# Patient Record
Sex: MALE | Race: WHITE | NOT HISPANIC OR LATINO | Employment: UNEMPLOYED | URBAN - METROPOLITAN AREA
[De-identification: names, ages, dates, MRNs, and addresses within clinical notes are randomized per-mention and may not be internally consistent; named-entity substitution may affect disease eponyms.]

---

## 2017-02-12 ENCOUNTER — GENERIC CONVERSION - ENCOUNTER (OUTPATIENT)
Dept: OTHER | Facility: OTHER | Age: 47
End: 2017-02-12

## 2017-02-12 ENCOUNTER — APPOINTMENT (INPATIENT)
Dept: RADIOLOGY | Facility: HOSPITAL | Age: 47
DRG: 201 | End: 2017-02-12
Payer: COMMERCIAL

## 2017-02-12 ENCOUNTER — APPOINTMENT (INPATIENT)
Dept: CT IMAGING | Facility: HOSPITAL | Age: 47
DRG: 201 | End: 2017-02-12
Payer: COMMERCIAL

## 2017-02-12 ENCOUNTER — APPOINTMENT (EMERGENCY)
Dept: RADIOLOGY | Facility: HOSPITAL | Age: 47
DRG: 201 | End: 2017-02-12
Payer: COMMERCIAL

## 2017-02-12 ENCOUNTER — HOSPITAL ENCOUNTER (INPATIENT)
Facility: HOSPITAL | Age: 47
LOS: 4 days | Discharge: NURSING, REHAB OR LONG-TERM CARE | DRG: 201 | End: 2017-02-16
Attending: EMERGENCY MEDICINE | Admitting: ANESTHESIOLOGY
Payer: COMMERCIAL

## 2017-02-12 DIAGNOSIS — E66.2 OBESITY HYPOVENTILATION SYNDROME (HCC): ICD-10-CM

## 2017-02-12 DIAGNOSIS — M25.562 LEFT KNEE PAIN: ICD-10-CM

## 2017-02-12 DIAGNOSIS — I48.91 ATRIAL FIBRILLATION WITH RVR (HCC): Primary | ICD-10-CM

## 2017-02-12 LAB
ALBUMIN SERPL BCP-MCNC: 3.4 G/DL (ref 3.5–5)
ALP SERPL-CCNC: 98 U/L (ref 46–116)
ALT SERPL W P-5'-P-CCNC: 34 U/L (ref 12–78)
ANION GAP SERPL CALCULATED.3IONS-SCNC: 7 MMOL/L (ref 4–13)
ANION GAP SERPL CALCULATED.3IONS-SCNC: 8 MMOL/L (ref 4–13)
APTT PPP: 22 SECONDS (ref 24–36)
APTT PPP: 28 SECONDS (ref 24–36)
AST SERPL W P-5'-P-CCNC: 25 U/L (ref 5–45)
BASOPHILS # BLD AUTO: 0.03 THOUSANDS/ΜL (ref 0–0.1)
BASOPHILS NFR BLD AUTO: 0 % (ref 0–1)
BILIRUB SERPL-MCNC: 0.8 MG/DL (ref 0.2–1)
BUN SERPL-MCNC: 16 MG/DL (ref 5–25)
BUN SERPL-MCNC: 17 MG/DL (ref 5–25)
CA-I BLD-SCNC: 1.12 MMOL/L (ref 1.12–1.32)
CALCIUM SERPL-MCNC: 9.3 MG/DL (ref 8.3–10.1)
CALCIUM SERPL-MCNC: 9.6 MG/DL (ref 8.3–10.1)
CHLORIDE SERPL-SCNC: 100 MMOL/L (ref 100–108)
CHLORIDE SERPL-SCNC: 99 MMOL/L (ref 100–108)
CK SERPL-CCNC: 48 U/L (ref 39–308)
CO2 SERPL-SCNC: 32 MMOL/L (ref 21–32)
CO2 SERPL-SCNC: 32 MMOL/L (ref 21–32)
CREAT SERPL-MCNC: 1.06 MG/DL (ref 0.6–1.3)
CREAT SERPL-MCNC: 1.13 MG/DL (ref 0.6–1.3)
DIGOXIN SERPL-MCNC: 0.4 NG/ML (ref 0.8–2)
EOSINOPHIL # BLD AUTO: 0.2 THOUSAND/ΜL (ref 0–0.61)
EOSINOPHIL NFR BLD AUTO: 2 % (ref 0–6)
ERYTHROCYTE [DISTWIDTH] IN BLOOD BY AUTOMATED COUNT: 14.6 % (ref 11.6–15.1)
ERYTHROCYTE [DISTWIDTH] IN BLOOD BY AUTOMATED COUNT: 14.6 % (ref 11.6–15.1)
EST. AVERAGE GLUCOSE BLD GHB EST-MCNC: 120 MG/DL
GFR SERPL CREATININE-BSD FRML MDRD: >60 ML/MIN/1.73SQ M
GFR SERPL CREATININE-BSD FRML MDRD: >60 ML/MIN/1.73SQ M
GLUCOSE SERPL-MCNC: 111 MG/DL (ref 65–140)
GLUCOSE SERPL-MCNC: 119 MG/DL (ref 65–140)
GLUCOSE SERPL-MCNC: 137 MG/DL (ref 65–140)
GLUCOSE SERPL-MCNC: 148 MG/DL (ref 65–140)
HBA1C MFR BLD: 5.8 % (ref 4.2–6.3)
HCT VFR BLD AUTO: 37.3 % (ref 36.5–49.3)
HCT VFR BLD AUTO: 41.4 % (ref 36.5–49.3)
HGB BLD-MCNC: 12.2 G/DL (ref 12–17)
HGB BLD-MCNC: 13.5 G/DL (ref 12–17)
INR PPP: 1.05 (ref 0.86–1.16)
LACTATE SERPL-SCNC: 1.4 MMOL/L (ref 0.5–2)
LYMPHOCYTES # BLD AUTO: 1.45 THOUSANDS/ΜL (ref 0.6–4.47)
LYMPHOCYTES NFR BLD AUTO: 13 % (ref 14–44)
MAGNESIUM SERPL-MCNC: 1.6 MG/DL (ref 1.6–2.6)
MAGNESIUM SERPL-MCNC: 1.7 MG/DL (ref 1.6–2.6)
MCH RBC QN AUTO: 27.8 PG (ref 26.8–34.3)
MCH RBC QN AUTO: 27.9 PG (ref 26.8–34.3)
MCHC RBC AUTO-ENTMCNC: 32.6 G/DL (ref 31.4–37.4)
MCHC RBC AUTO-ENTMCNC: 32.7 G/DL (ref 31.4–37.4)
MCV RBC AUTO: 85 FL (ref 82–98)
MCV RBC AUTO: 85 FL (ref 82–98)
MONOCYTES # BLD AUTO: 0.53 THOUSAND/ΜL (ref 0.17–1.22)
MONOCYTES NFR BLD AUTO: 5 % (ref 4–12)
NEUTROPHILS # BLD AUTO: 8.97 THOUSANDS/ΜL (ref 1.85–7.62)
NEUTS SEG NFR BLD AUTO: 80 % (ref 43–75)
NRBC BLD AUTO-RTO: 0 /100 WBCS
NT-PROBNP SERPL-MCNC: 301 PG/ML
PHOSPHATE SERPL-MCNC: 3.5 MG/DL (ref 2.7–4.5)
PLATELET # BLD AUTO: 244 THOUSANDS/UL (ref 149–390)
PLATELET # BLD AUTO: 264 THOUSANDS/UL (ref 149–390)
PMV BLD AUTO: 10.7 FL (ref 8.9–12.7)
PMV BLD AUTO: 10.7 FL (ref 8.9–12.7)
POTASSIUM SERPL-SCNC: 3.6 MMOL/L (ref 3.5–5.3)
POTASSIUM SERPL-SCNC: 4 MMOL/L (ref 3.5–5.3)
PROT SERPL-MCNC: 7.8 G/DL (ref 6.4–8.2)
PROTHROMBIN TIME: 13.6 SECONDS (ref 12–14.3)
RBC # BLD AUTO: 4.38 MILLION/UL (ref 3.88–5.62)
RBC # BLD AUTO: 4.85 MILLION/UL (ref 3.88–5.62)
SODIUM SERPL-SCNC: 138 MMOL/L (ref 136–145)
SODIUM SERPL-SCNC: 140 MMOL/L (ref 136–145)
TROPONIN I SERPL-MCNC: <0.02 NG/ML
WBC # BLD AUTO: 11.26 THOUSAND/UL (ref 4.31–10.16)
WBC # BLD AUTO: 9.37 THOUSAND/UL (ref 4.31–10.16)

## 2017-02-12 PROCEDURE — 99285 EMERGENCY DEPT VISIT HI MDM: CPT

## 2017-02-12 PROCEDURE — 82330 ASSAY OF CALCIUM: CPT | Performed by: NURSE PRACTITIONER

## 2017-02-12 PROCEDURE — 87147 CULTURE TYPE IMMUNOLOGIC: CPT | Performed by: EMERGENCY MEDICINE

## 2017-02-12 PROCEDURE — 94760 N-INVAS EAR/PLS OXIMETRY 1: CPT

## 2017-02-12 PROCEDURE — 84100 ASSAY OF PHOSPHORUS: CPT | Performed by: EMERGENCY MEDICINE

## 2017-02-12 PROCEDURE — 84484 ASSAY OF TROPONIN QUANT: CPT | Performed by: EMERGENCY MEDICINE

## 2017-02-12 PROCEDURE — 94640 AIRWAY INHALATION TREATMENT: CPT

## 2017-02-12 PROCEDURE — 93005 ELECTROCARDIOGRAM TRACING: CPT | Performed by: NURSE PRACTITIONER

## 2017-02-12 PROCEDURE — 96376 TX/PRO/DX INJ SAME DRUG ADON: CPT

## 2017-02-12 PROCEDURE — 87040 BLOOD CULTURE FOR BACTERIA: CPT | Performed by: EMERGENCY MEDICINE

## 2017-02-12 PROCEDURE — 80162 ASSAY OF DIGOXIN TOTAL: CPT | Performed by: EMERGENCY MEDICINE

## 2017-02-12 PROCEDURE — 83880 ASSAY OF NATRIURETIC PEPTIDE: CPT | Performed by: EMERGENCY MEDICINE

## 2017-02-12 PROCEDURE — 83735 ASSAY OF MAGNESIUM: CPT | Performed by: EMERGENCY MEDICINE

## 2017-02-12 PROCEDURE — 82948 REAGENT STRIP/BLOOD GLUCOSE: CPT

## 2017-02-12 PROCEDURE — 82550 ASSAY OF CK (CPK): CPT | Performed by: EMERGENCY MEDICINE

## 2017-02-12 PROCEDURE — 85025 COMPLETE CBC W/AUTO DIFF WBC: CPT | Performed by: EMERGENCY MEDICINE

## 2017-02-12 PROCEDURE — 80053 COMPREHEN METABOLIC PANEL: CPT | Performed by: EMERGENCY MEDICINE

## 2017-02-12 PROCEDURE — 71010 HB CHEST X-RAY 1 VIEW FRONTAL (PORTABLE): CPT

## 2017-02-12 PROCEDURE — 85610 PROTHROMBIN TIME: CPT | Performed by: NURSE PRACTITIONER

## 2017-02-12 PROCEDURE — 96365 THER/PROPH/DIAG IV INF INIT: CPT

## 2017-02-12 PROCEDURE — 83036 HEMOGLOBIN GLYCOSYLATED A1C: CPT | Performed by: NURSE PRACTITIONER

## 2017-02-12 PROCEDURE — 85730 THROMBOPLASTIN TIME PARTIAL: CPT | Performed by: NURSE PRACTITIONER

## 2017-02-12 PROCEDURE — 36415 COLL VENOUS BLD VENIPUNCTURE: CPT | Performed by: EMERGENCY MEDICINE

## 2017-02-12 PROCEDURE — 93005 ELECTROCARDIOGRAM TRACING: CPT | Performed by: EMERGENCY MEDICINE

## 2017-02-12 PROCEDURE — 84484 ASSAY OF TROPONIN QUANT: CPT | Performed by: NURSE PRACTITIONER

## 2017-02-12 PROCEDURE — 83605 ASSAY OF LACTIC ACID: CPT | Performed by: EMERGENCY MEDICINE

## 2017-02-12 PROCEDURE — 83735 ASSAY OF MAGNESIUM: CPT | Performed by: NURSE PRACTITIONER

## 2017-02-12 PROCEDURE — 80048 BASIC METABOLIC PNL TOTAL CA: CPT | Performed by: NURSE PRACTITIONER

## 2017-02-12 PROCEDURE — 87449 NOS EACH ORGANISM AG IA: CPT | Performed by: NURSE PRACTITIONER

## 2017-02-12 PROCEDURE — 85027 COMPLETE CBC AUTOMATED: CPT | Performed by: NURSE PRACTITIONER

## 2017-02-12 RX ORDER — ACETAMINOPHEN 325 MG/1
650 TABLET ORAL EVERY 6 HOURS PRN
Status: DISCONTINUED | OUTPATIENT
Start: 2017-02-12 | End: 2017-02-16 | Stop reason: HOSPADM

## 2017-02-12 RX ORDER — DIGOXIN 125 MCG
125 TABLET ORAL DAILY
Status: DISCONTINUED | OUTPATIENT
Start: 2017-02-13 | End: 2017-02-16 | Stop reason: HOSPADM

## 2017-02-12 RX ORDER — MUSCLE RUB CREAM 100; 150 MG/G; MG/G
1 CREAM TOPICAL 4 TIMES DAILY PRN
Status: DISCONTINUED | OUTPATIENT
Start: 2017-02-12 | End: 2017-02-16 | Stop reason: HOSPADM

## 2017-02-12 RX ORDER — BUPROPION HYDROCHLORIDE 100 MG/1
100 TABLET ORAL DAILY
Status: DISCONTINUED | OUTPATIENT
Start: 2017-02-13 | End: 2017-02-16 | Stop reason: HOSPADM

## 2017-02-12 RX ORDER — HEPARIN SODIUM 10000 [USP'U]/100ML
3-30 INJECTION, SOLUTION INTRAVENOUS
Status: DISCONTINUED | OUTPATIENT
Start: 2017-02-12 | End: 2017-02-14

## 2017-02-12 RX ORDER — DILTIAZEM HYDROCHLORIDE 5 MG/ML
0.35 INJECTION INTRAVENOUS ONCE
Status: DISCONTINUED | OUTPATIENT
Start: 2017-02-12 | End: 2017-02-12

## 2017-02-12 RX ORDER — HEPARIN SODIUM 1000 [USP'U]/ML
5000 INJECTION, SOLUTION INTRAVENOUS; SUBCUTANEOUS AS NEEDED
Status: DISCONTINUED | OUTPATIENT
Start: 2017-02-12 | End: 2017-02-14

## 2017-02-12 RX ORDER — MONTELUKAST SODIUM 10 MG/1
10 TABLET ORAL
Status: DISCONTINUED | OUTPATIENT
Start: 2017-02-12 | End: 2017-02-16 | Stop reason: HOSPADM

## 2017-02-12 RX ORDER — LEVALBUTEROL 1.25 MG/.5ML
1.25 SOLUTION, CONCENTRATE RESPIRATORY (INHALATION) EVERY 6 HOURS
Status: DISCONTINUED | OUTPATIENT
Start: 2017-02-12 | End: 2017-02-12

## 2017-02-12 RX ORDER — LEVALBUTEROL 1.25 MG/.5ML
1.25 SOLUTION, CONCENTRATE RESPIRATORY (INHALATION) EVERY 6 HOURS
Status: DISCONTINUED | OUTPATIENT
Start: 2017-02-12 | End: 2017-02-16 | Stop reason: HOSPADM

## 2017-02-12 RX ORDER — SPIRONOLACTONE 100 MG/1
100 TABLET, FILM COATED ORAL DAILY
Status: DISCONTINUED | OUTPATIENT
Start: 2017-02-13 | End: 2017-02-12

## 2017-02-12 RX ORDER — INSULIN GLARGINE 100 [IU]/ML
80 INJECTION, SOLUTION SUBCUTANEOUS
Status: DISCONTINUED | OUTPATIENT
Start: 2017-02-12 | End: 2017-02-16 | Stop reason: HOSPADM

## 2017-02-12 RX ORDER — KETOROLAC TROMETHAMINE 30 MG/ML
30 INJECTION, SOLUTION INTRAMUSCULAR; INTRAVENOUS ONCE
Status: COMPLETED | OUTPATIENT
Start: 2017-02-12 | End: 2017-02-12

## 2017-02-12 RX ORDER — FUROSEMIDE 20 MG/1
TABLET ORAL
Status: DISPENSED
Start: 2017-02-12 | End: 2017-02-13

## 2017-02-12 RX ORDER — SENNOSIDES 8.6 MG
1 TABLET ORAL 2 TIMES DAILY
Status: DISCONTINUED | OUTPATIENT
Start: 2017-02-12 | End: 2017-02-16 | Stop reason: HOSPADM

## 2017-02-12 RX ORDER — KETOROLAC TROMETHAMINE 30 MG/ML
30 INJECTION, SOLUTION INTRAMUSCULAR; INTRAVENOUS ONCE
Status: DISCONTINUED | OUTPATIENT
Start: 2017-02-12 | End: 2017-02-12

## 2017-02-12 RX ORDER — LORATADINE 10 MG/1
10 TABLET ORAL DAILY
Status: DISCONTINUED | OUTPATIENT
Start: 2017-02-13 | End: 2017-02-16 | Stop reason: HOSPADM

## 2017-02-12 RX ORDER — PANTOPRAZOLE SODIUM 40 MG/1
40 TABLET, DELAYED RELEASE ORAL
Status: DISCONTINUED | OUTPATIENT
Start: 2017-02-12 | End: 2017-02-16 | Stop reason: HOSPADM

## 2017-02-12 RX ORDER — FUROSEMIDE 20 MG/1
80 TABLET ORAL ONCE
Status: COMPLETED | OUTPATIENT
Start: 2017-02-12 | End: 2017-02-12

## 2017-02-12 RX ORDER — PRIMIDONE 50 MG/1
50 TABLET ORAL
Status: DISCONTINUED | OUTPATIENT
Start: 2017-02-12 | End: 2017-02-16 | Stop reason: HOSPADM

## 2017-02-12 RX ORDER — DILTIAZEM HYDROCHLORIDE 5 MG/ML
15 INJECTION INTRAVENOUS ONCE
Status: COMPLETED | OUTPATIENT
Start: 2017-02-12 | End: 2017-02-12

## 2017-02-12 RX ORDER — B-COMPLEX WITH VITAMIN C
1 TABLET ORAL
Status: DISCONTINUED | OUTPATIENT
Start: 2017-02-13 | End: 2017-02-13

## 2017-02-12 RX ORDER — FUROSEMIDE 20 MG/1
40 TABLET ORAL ONCE
Status: DISCONTINUED | OUTPATIENT
Start: 2017-02-12 | End: 2017-02-12

## 2017-02-12 RX ORDER — FUROSEMIDE 10 MG/ML
80 INJECTION INTRAMUSCULAR; INTRAVENOUS ONCE
Status: DISCONTINUED | OUTPATIENT
Start: 2017-02-12 | End: 2017-02-12

## 2017-02-12 RX ORDER — AMMONIUM LACTATE 12 G/100G
1 LOTION TOPICAL 2 TIMES DAILY
Status: DISCONTINUED | OUTPATIENT
Start: 2017-02-12 | End: 2017-02-16 | Stop reason: HOSPADM

## 2017-02-12 RX ORDER — HEPARIN SODIUM 1000 [USP'U]/ML
10000 INJECTION, SOLUTION INTRAVENOUS; SUBCUTANEOUS AS NEEDED
Status: DISCONTINUED | OUTPATIENT
Start: 2017-02-12 | End: 2017-02-14

## 2017-02-12 RX ORDER — FUROSEMIDE 10 MG/ML
80 INJECTION INTRAMUSCULAR; INTRAVENOUS ONCE
Status: COMPLETED | OUTPATIENT
Start: 2017-02-12 | End: 2017-02-12

## 2017-02-12 RX ORDER — ONDANSETRON 2 MG/ML
4 INJECTION INTRAMUSCULAR; INTRAVENOUS EVERY 6 HOURS PRN
Status: DISCONTINUED | OUTPATIENT
Start: 2017-02-12 | End: 2017-02-16 | Stop reason: HOSPADM

## 2017-02-12 RX ORDER — FLUTICASONE PROPIONATE 50 MCG
2 SPRAY, SUSPENSION (ML) NASAL 2 TIMES DAILY
Status: DISCONTINUED | OUTPATIENT
Start: 2017-02-12 | End: 2017-02-16 | Stop reason: HOSPADM

## 2017-02-12 RX ORDER — GABAPENTIN 300 MG/1
300 CAPSULE ORAL 2 TIMES DAILY PRN
Status: DISCONTINUED | OUTPATIENT
Start: 2017-02-12 | End: 2017-02-16 | Stop reason: HOSPADM

## 2017-02-12 RX ADMIN — ACETAMINOPHEN 650 MG: 325 TABLET ORAL at 19:22

## 2017-02-12 RX ADMIN — DILTIAZEM HYDROCHLORIDE 15 MG: 5 INJECTION INTRAVENOUS at 11:45

## 2017-02-12 RX ADMIN — IPRATROPIUM BROMIDE 0.5 MG: 0.5 SOLUTION RESPIRATORY (INHALATION) at 19:32

## 2017-02-12 RX ADMIN — GABAPENTIN 300 MG: 300 CAPSULE ORAL at 22:02

## 2017-02-12 RX ADMIN — AZTREONAM 2000 MG: 2 INJECTION, POWDER, LYOPHILIZED, FOR SOLUTION INTRAMUSCULAR; INTRAVENOUS at 18:05

## 2017-02-12 RX ADMIN — HEPARIN SODIUM AND DEXTROSE 18 UNITS/KG/HR: 10000; 5 INJECTION INTRAVENOUS at 17:33

## 2017-02-12 RX ADMIN — LEVALBUTEROL HYDROCHLORIDE 1.25 MG: 1.25 SOLUTION, CONCENTRATE RESPIRATORY (INHALATION) at 19:31

## 2017-02-12 RX ADMIN — PRIMIDONE 50 MG: 50 TABLET ORAL at 21:53

## 2017-02-12 RX ADMIN — MONTELUKAST SODIUM 10 MG: 10 TABLET, FILM COATED ORAL at 21:53

## 2017-02-12 RX ADMIN — KETOROLAC TROMETHAMINE 30 MG: 30 INJECTION, SOLUTION INTRAMUSCULAR at 14:59

## 2017-02-12 RX ADMIN — VANCOMYCIN HYDROCHLORIDE 2000 MG: 1 INJECTION, POWDER, LYOPHILIZED, FOR SOLUTION INTRAVENOUS at 19:22

## 2017-02-12 RX ADMIN — DILTIAZEM HYDROCHLORIDE 15 MG/HR: 5 INJECTION INTRAVENOUS at 22:22

## 2017-02-12 RX ADMIN — FUROSEMIDE 80 MG: 20 TABLET ORAL at 13:15

## 2017-02-12 RX ADMIN — DILTIAZEM HYDROCHLORIDE 5 MG/HR: 5 INJECTION INTRAVENOUS at 12:10

## 2017-02-12 RX ADMIN — PANTOPRAZOLE SODIUM 40 MG: 40 TABLET, DELAYED RELEASE ORAL at 17:46

## 2017-02-12 RX ADMIN — INSULIN GLARGINE 80 UNITS: 100 INJECTION, SOLUTION SUBCUTANEOUS at 21:53

## 2017-02-12 RX ADMIN — FUROSEMIDE 80 MG: 10 INJECTION, SOLUTION INTRAMUSCULAR; INTRAVENOUS at 17:45

## 2017-02-12 RX ADMIN — SENNOSIDES 8.6 MG: 8.6 TABLET, FILM COATED ORAL at 17:46

## 2017-02-13 ENCOUNTER — APPOINTMENT (INPATIENT)
Dept: RADIOLOGY | Facility: HOSPITAL | Age: 47
DRG: 201 | End: 2017-02-13
Payer: COMMERCIAL

## 2017-02-13 ENCOUNTER — APPOINTMENT (INPATIENT)
Dept: NON INVASIVE DIAGNOSTICS | Facility: HOSPITAL | Age: 47
DRG: 201 | End: 2017-02-13
Payer: COMMERCIAL

## 2017-02-13 ENCOUNTER — GENERIC CONVERSION - ENCOUNTER (OUTPATIENT)
Dept: OTHER | Facility: OTHER | Age: 47
End: 2017-02-13

## 2017-02-13 ENCOUNTER — APPOINTMENT (INPATIENT)
Dept: CT IMAGING | Facility: HOSPITAL | Age: 47
DRG: 201 | End: 2017-02-13
Payer: COMMERCIAL

## 2017-02-13 ENCOUNTER — APPOINTMENT (INPATIENT)
Dept: ULTRASOUND IMAGING | Facility: HOSPITAL | Age: 47
DRG: 201 | End: 2017-02-13
Payer: COMMERCIAL

## 2017-02-13 PROBLEM — Z91.19 MEDICAL NON-COMPLIANCE: Status: ACTIVE | Noted: 2017-02-13

## 2017-02-13 PROBLEM — I48.91 ATRIAL FIBRILLATION WITH RVR (HCC): Status: ACTIVE | Noted: 2017-02-13

## 2017-02-13 PROBLEM — I82.409 DVT (DEEP VENOUS THROMBOSIS) (HCC): Status: ACTIVE | Noted: 2017-02-13

## 2017-02-13 PROBLEM — Z91.199 MEDICAL NON-COMPLIANCE: Status: ACTIVE | Noted: 2017-02-13

## 2017-02-13 LAB
ALBUMIN SERPL BCP-MCNC: 3.1 G/DL (ref 3.5–5)
ALP SERPL-CCNC: 85 U/L (ref 46–116)
ALT SERPL W P-5'-P-CCNC: 30 U/L (ref 12–78)
ANION GAP SERPL CALCULATED.3IONS-SCNC: 8 MMOL/L (ref 4–13)
APTT PPP: 51 SECONDS (ref 24–36)
APTT PPP: 66 SECONDS (ref 24–36)
APTT PPP: 73 SECONDS (ref 24–36)
AST SERPL W P-5'-P-CCNC: 21 U/L (ref 5–45)
ATRIAL RATE: 192 BPM
ATRIAL RATE: 83 BPM
BILIRUB SERPL-MCNC: 1.1 MG/DL (ref 0.2–1)
BUN SERPL-MCNC: 13 MG/DL (ref 5–25)
CALCIUM SERPL-MCNC: 8.9 MG/DL (ref 8.3–10.1)
CHLORIDE SERPL-SCNC: 99 MMOL/L (ref 100–108)
CO2 SERPL-SCNC: 32 MMOL/L (ref 21–32)
CREAT SERPL-MCNC: 0.96 MG/DL (ref 0.6–1.3)
ERYTHROCYTE [DISTWIDTH] IN BLOOD BY AUTOMATED COUNT: 14.7 % (ref 11.6–15.1)
GFR SERPL CREATININE-BSD FRML MDRD: >60 ML/MIN/1.73SQ M
GLUCOSE SERPL-MCNC: 105 MG/DL (ref 65–140)
GLUCOSE SERPL-MCNC: 167 MG/DL (ref 65–140)
GLUCOSE SERPL-MCNC: 168 MG/DL (ref 65–140)
GLUCOSE SERPL-MCNC: 292 MG/DL (ref 65–140)
GLUCOSE SERPL-MCNC: 93 MG/DL (ref 65–140)
HCT VFR BLD AUTO: 37.8 % (ref 36.5–49.3)
HGB BLD-MCNC: 12.3 G/DL (ref 12–17)
L PNEUMO1 AG UR QL IA.RAPID: NEGATIVE
MAGNESIUM SERPL-MCNC: 2 MG/DL (ref 1.6–2.6)
MCH RBC QN AUTO: 28 PG (ref 26.8–34.3)
MCHC RBC AUTO-ENTMCNC: 32.5 G/DL (ref 31.4–37.4)
MCV RBC AUTO: 86 FL (ref 82–98)
PHOSPHATE SERPL-MCNC: 5.2 MG/DL (ref 2.7–4.5)
PLATELET # BLD AUTO: 237 THOUSANDS/UL (ref 149–390)
PMV BLD AUTO: 10.5 FL (ref 8.9–12.7)
POTASSIUM SERPL-SCNC: 3.4 MMOL/L (ref 3.5–5.3)
PROT SERPL-MCNC: 7.2 G/DL (ref 6.4–8.2)
QRS AXIS: 34 DEGREES
QRS AXIS: 62 DEGREES
QRSD INTERVAL: 80 MS
QRSD INTERVAL: 82 MS
QT INTERVAL: 286 MS
QT INTERVAL: 294 MS
QTC INTERVAL: 406 MS
QTC INTERVAL: 468 MS
RBC # BLD AUTO: 4.39 MILLION/UL (ref 3.88–5.62)
S PNEUM AG UR QL: NEGATIVE
SODIUM SERPL-SCNC: 139 MMOL/L (ref 136–145)
T WAVE AXIS: -42 DEGREES
T WAVE AXIS: -45 DEGREES
VANCOMYCIN TROUGH SERPL-MCNC: 20.2 UG/ML (ref 10–20)
VENTRICULAR RATE: 115 BPM
VENTRICULAR RATE: 161 BPM
WBC # BLD AUTO: 8.98 THOUSAND/UL (ref 4.31–10.16)

## 2017-02-13 PROCEDURE — 80202 ASSAY OF VANCOMYCIN: CPT | Performed by: NURSE PRACTITIONER

## 2017-02-13 PROCEDURE — 82948 REAGENT STRIP/BLOOD GLUCOSE: CPT

## 2017-02-13 PROCEDURE — 93970 EXTREMITY STUDY: CPT

## 2017-02-13 PROCEDURE — 83735 ASSAY OF MAGNESIUM: CPT | Performed by: NURSE PRACTITIONER

## 2017-02-13 PROCEDURE — 85027 COMPLETE CBC AUTOMATED: CPT | Performed by: NURSE PRACTITIONER

## 2017-02-13 PROCEDURE — 85730 THROMBOPLASTIN TIME PARTIAL: CPT | Performed by: NURSE PRACTITIONER

## 2017-02-13 PROCEDURE — 84100 ASSAY OF PHOSPHORUS: CPT | Performed by: NURSE PRACTITIONER

## 2017-02-13 PROCEDURE — 71010 HB CHEST X-RAY 1 VIEW FRONTAL: CPT

## 2017-02-13 PROCEDURE — 94640 AIRWAY INHALATION TREATMENT: CPT

## 2017-02-13 PROCEDURE — 94760 N-INVAS EAR/PLS OXIMETRY 1: CPT

## 2017-02-13 PROCEDURE — 36569 INSJ PICC 5 YR+ W/O IMAGING: CPT

## 2017-02-13 PROCEDURE — 80053 COMPREHEN METABOLIC PANEL: CPT | Performed by: NURSE PRACTITIONER

## 2017-02-13 PROCEDURE — 02HV33Z INSERTION OF INFUSION DEVICE INTO SUPERIOR VENA CAVA, PERCUTANEOUS APPROACH: ICD-10-PCS | Performed by: INTERNAL MEDICINE

## 2017-02-13 PROCEDURE — C1751 CATH, INF, PER/CENT/MIDLINE: HCPCS

## 2017-02-13 RX ORDER — LORAZEPAM 2 MG/ML
0.25 INJECTION INTRAMUSCULAR ONCE
Status: COMPLETED | OUTPATIENT
Start: 2017-02-13 | End: 2017-02-13

## 2017-02-13 RX ORDER — POTASSIUM CHLORIDE 20 MEQ/1
40 TABLET, EXTENDED RELEASE ORAL 2 TIMES DAILY
Status: COMPLETED | OUTPATIENT
Start: 2017-02-13 | End: 2017-02-13

## 2017-02-13 RX ORDER — LORAZEPAM 2 MG/ML
INJECTION INTRAMUSCULAR
Status: COMPLETED
Start: 2017-02-13 | End: 2017-02-13

## 2017-02-13 RX ORDER — FUROSEMIDE 10 MG/ML
80 INJECTION INTRAMUSCULAR; INTRAVENOUS DAILY
Status: DISCONTINUED | OUTPATIENT
Start: 2017-02-13 | End: 2017-02-14

## 2017-02-13 RX ORDER — B-COMPLEX WITH VITAMIN C
1 TABLET ORAL
Status: DISCONTINUED | OUTPATIENT
Start: 2017-02-13 | End: 2017-02-16 | Stop reason: HOSPADM

## 2017-02-13 RX ORDER — MAGNESIUM SULFATE HEPTAHYDRATE 40 MG/ML
2 INJECTION, SOLUTION INTRAVENOUS ONCE
Status: COMPLETED | OUTPATIENT
Start: 2017-02-13 | End: 2017-02-13

## 2017-02-13 RX ORDER — FUROSEMIDE 80 MG
80 TABLET ORAL
Status: DISCONTINUED | OUTPATIENT
Start: 2017-02-13 | End: 2017-02-16 | Stop reason: HOSPADM

## 2017-02-13 RX ORDER — HALOPERIDOL 5 MG/ML
1 INJECTION INTRAMUSCULAR ONCE
Status: COMPLETED | OUTPATIENT
Start: 2017-02-13 | End: 2017-02-13

## 2017-02-13 RX ORDER — HALOPERIDOL 5 MG/ML
INJECTION INTRAMUSCULAR
Status: COMPLETED
Start: 2017-02-13 | End: 2017-02-13

## 2017-02-13 RX ADMIN — HEPARIN SODIUM AND DEXTROSE 22 UNITS/KG/HR: 10000; 5 INJECTION INTRAVENOUS at 21:53

## 2017-02-13 RX ADMIN — LEVALBUTEROL HYDROCHLORIDE 1.25 MG: 1.25 SOLUTION, CONCENTRATE RESPIRATORY (INHALATION) at 01:40

## 2017-02-13 RX ADMIN — Medication 1 APPLICATION: at 10:00

## 2017-02-13 RX ADMIN — DILTIAZEM HYDROCHLORIDE 10 MG/HR: 5 INJECTION INTRAVENOUS at 23:33

## 2017-02-13 RX ADMIN — HALOPERIDOL LACTATE 1 MG: 5 INJECTION, SOLUTION INTRAMUSCULAR at 10:47

## 2017-02-13 RX ADMIN — HEPARIN SODIUM AND DEXTROSE 22 UNITS/KG/HR: 10000; 5 INJECTION INTRAVENOUS at 12:53

## 2017-02-13 RX ADMIN — ACETAMINOPHEN 650 MG: 325 TABLET ORAL at 17:31

## 2017-02-13 RX ADMIN — BUPROPION HYDROCHLORIDE 100 MG: 100 TABLET, FILM COATED ORAL at 10:00

## 2017-02-13 RX ADMIN — HALOPERIDOL 1 MG: 5 INJECTION INTRAMUSCULAR at 10:47

## 2017-02-13 RX ADMIN — INSULIN LISPRO 1 UNITS: 100 INJECTION, SOLUTION INTRAVENOUS; SUBCUTANEOUS at 11:38

## 2017-02-13 RX ADMIN — AZTREONAM 2000 MG: 2 INJECTION, POWDER, LYOPHILIZED, FOR SOLUTION INTRAMUSCULAR; INTRAVENOUS at 17:32

## 2017-02-13 RX ADMIN — FUROSEMIDE 80 MG: 80 TABLET ORAL at 11:32

## 2017-02-13 RX ADMIN — Medication 1 APPLICATION: at 17:38

## 2017-02-13 RX ADMIN — Medication 0.25 MG: at 10:47

## 2017-02-13 RX ADMIN — INSULIN LISPRO 2 UNITS: 100 INJECTION, SOLUTION INTRAVENOUS; SUBCUTANEOUS at 21:07

## 2017-02-13 RX ADMIN — SENNOSIDES 8.6 MG: 8.6 TABLET, FILM COATED ORAL at 10:00

## 2017-02-13 RX ADMIN — MAGNESIUM SULFATE HEPTAHYDRATE 2 G: 40 INJECTION, SOLUTION INTRAVENOUS at 01:05

## 2017-02-13 RX ADMIN — SENNOSIDES 8.6 MG: 8.6 TABLET, FILM COATED ORAL at 17:32

## 2017-02-13 RX ADMIN — LEVALBUTEROL HYDROCHLORIDE 1.25 MG: 1.25 SOLUTION, CONCENTRATE RESPIRATORY (INHALATION) at 13:32

## 2017-02-13 RX ADMIN — MENTHOL, METHYL SALICYLATE 1 APPLICATION: 10; 15 CREAM TOPICAL at 00:04

## 2017-02-13 RX ADMIN — POTASSIUM CHLORIDE 40 MEQ: 1500 TABLET, EXTENDED RELEASE ORAL at 17:32

## 2017-02-13 RX ADMIN — POTASSIUM CHLORIDE 40 MEQ: 1500 TABLET, EXTENDED RELEASE ORAL at 10:00

## 2017-02-13 RX ADMIN — DIGOXIN 125 MCG: 0.12 TABLET ORAL at 10:00

## 2017-02-13 RX ADMIN — DILTIAZEM HYDROCHLORIDE 10 MG/HR: 5 INJECTION INTRAVENOUS at 14:01

## 2017-02-13 RX ADMIN — LORAZEPAM 0.25 MG: 2 INJECTION INTRAMUSCULAR at 10:48

## 2017-02-13 RX ADMIN — HYDROMORPHONE HYDROCHLORIDE 0.25 MG: 1 INJECTION, SOLUTION INTRAMUSCULAR; INTRAVENOUS; SUBCUTANEOUS at 10:47

## 2017-02-13 RX ADMIN — HEPARIN SODIUM AND DEXTROSE 20 UNITS/KG/HR: 10000; 5 INJECTION INTRAVENOUS at 02:22

## 2017-02-13 RX ADMIN — PANTOPRAZOLE SODIUM 40 MG: 40 TABLET, DELAYED RELEASE ORAL at 07:55

## 2017-02-13 RX ADMIN — LEVALBUTEROL HYDROCHLORIDE 1.25 MG: 1.25 SOLUTION, CONCENTRATE RESPIRATORY (INHALATION) at 19:44

## 2017-02-13 RX ADMIN — IPRATROPIUM BROMIDE 0.5 MG: 0.5 SOLUTION RESPIRATORY (INHALATION) at 01:40

## 2017-02-13 RX ADMIN — AZTREONAM 2000 MG: 2 INJECTION, POWDER, LYOPHILIZED, FOR SOLUTION INTRAMUSCULAR; INTRAVENOUS at 01:07

## 2017-02-13 RX ADMIN — LEVALBUTEROL HYDROCHLORIDE 1.25 MG: 1.25 SOLUTION, CONCENTRATE RESPIRATORY (INHALATION) at 07:45

## 2017-02-13 RX ADMIN — FUROSEMIDE 80 MG: 10 INJECTION, SOLUTION INTRAMUSCULAR; INTRAVENOUS at 10:00

## 2017-02-13 RX ADMIN — FLUTICASONE PROPIONATE 2 SPRAY: 50 SPRAY, METERED NASAL at 10:00

## 2017-02-13 RX ADMIN — LORAZEPAM 0.25 MG: 2 INJECTION INTRAMUSCULAR; INTRAVENOUS at 10:48

## 2017-02-13 RX ADMIN — FLUTICASONE PROPIONATE 2 SPRAY: 50 SPRAY, METERED NASAL at 17:38

## 2017-02-13 RX ADMIN — IPRATROPIUM BROMIDE 0.5 MG: 0.5 SOLUTION RESPIRATORY (INHALATION) at 19:43

## 2017-02-13 RX ADMIN — INSULIN GLARGINE 80 UNITS: 100 INJECTION, SOLUTION SUBCUTANEOUS at 21:06

## 2017-02-13 RX ADMIN — MENTHOL, METHYL SALICYLATE 1 APPLICATION: 10; 15 CREAM TOPICAL at 21:08

## 2017-02-13 RX ADMIN — LORATADINE 10 MG: 10 TABLET ORAL at 10:00

## 2017-02-13 RX ADMIN — ACETAMINOPHEN 650 MG: 325 TABLET ORAL at 08:01

## 2017-02-13 RX ADMIN — MONTELUKAST SODIUM 10 MG: 10 TABLET, FILM COATED ORAL at 21:07

## 2017-02-13 RX ADMIN — AZTREONAM 2000 MG: 2 INJECTION, POWDER, LYOPHILIZED, FOR SOLUTION INTRAMUSCULAR; INTRAVENOUS at 11:00

## 2017-02-13 RX ADMIN — VANCOMYCIN HYDROCHLORIDE 2000 MG: 1 INJECTION, POWDER, LYOPHILIZED, FOR SOLUTION INTRAVENOUS at 02:22

## 2017-02-13 RX ADMIN — HEPARIN SODIUM 10000 UNITS: 1000 INJECTION, SOLUTION INTRAVENOUS; SUBCUTANEOUS at 00:04

## 2017-02-13 RX ADMIN — PRIMIDONE 50 MG: 50 TABLET ORAL at 21:07

## 2017-02-13 RX ADMIN — PANTOPRAZOLE SODIUM 40 MG: 40 TABLET, DELAYED RELEASE ORAL at 15:25

## 2017-02-13 RX ADMIN — IPRATROPIUM BROMIDE 0.5 MG: 0.5 SOLUTION RESPIRATORY (INHALATION) at 13:32

## 2017-02-13 RX ADMIN — HEPARIN SODIUM 5000 UNITS: 1000 INJECTION, SOLUTION INTRAVENOUS; SUBCUTANEOUS at 12:47

## 2017-02-13 RX ADMIN — GABAPENTIN 300 MG: 300 CAPSULE ORAL at 21:06

## 2017-02-13 RX ADMIN — VANCOMYCIN HYDROCHLORIDE 2000 MG: 1 INJECTION, POWDER, LYOPHILIZED, FOR SOLUTION INTRAVENOUS at 17:45

## 2017-02-13 RX ADMIN — INSULIN LISPRO 1 UNITS: 100 INJECTION, SOLUTION INTRAVENOUS; SUBCUTANEOUS at 15:37

## 2017-02-13 RX ADMIN — IPRATROPIUM BROMIDE 0.5 MG: 0.5 SOLUTION RESPIRATORY (INHALATION) at 07:45

## 2017-02-13 RX ADMIN — VANCOMYCIN HYDROCHLORIDE 2000 MG: 1 INJECTION, POWDER, LYOPHILIZED, FOR SOLUTION INTRAVENOUS at 11:32

## 2017-02-14 ENCOUNTER — GENERIC CONVERSION - ENCOUNTER (OUTPATIENT)
Dept: OTHER | Facility: OTHER | Age: 47
End: 2017-02-14

## 2017-02-14 ENCOUNTER — APPOINTMENT (INPATIENT)
Dept: CT IMAGING | Facility: HOSPITAL | Age: 47
DRG: 201 | End: 2017-02-14
Payer: COMMERCIAL

## 2017-02-14 ENCOUNTER — APPOINTMENT (INPATIENT)
Dept: NON INVASIVE DIAGNOSTICS | Facility: HOSPITAL | Age: 47
DRG: 201 | End: 2017-02-14
Payer: COMMERCIAL

## 2017-02-14 LAB
ANION GAP SERPL CALCULATED.3IONS-SCNC: 3 MMOL/L (ref 4–13)
APTT PPP: 47 SECONDS (ref 24–36)
APTT PPP: 59 SECONDS (ref 24–36)
APTT PPP: 64 SECONDS (ref 24–36)
APTT PPP: 84 SECONDS (ref 24–36)
BUN SERPL-MCNC: 11 MG/DL (ref 5–25)
CALCIUM SERPL-MCNC: 8.9 MG/DL (ref 8.3–10.1)
CHLORIDE SERPL-SCNC: 101 MMOL/L (ref 100–108)
CO2 SERPL-SCNC: 35 MMOL/L (ref 21–32)
CREAT SERPL-MCNC: 0.9 MG/DL (ref 0.6–1.3)
ERYTHROCYTE [DISTWIDTH] IN BLOOD BY AUTOMATED COUNT: 14.5 % (ref 11.6–15.1)
GFR SERPL CREATININE-BSD FRML MDRD: >60 ML/MIN/1.73SQ M
GLUCOSE SERPL-MCNC: 103 MG/DL (ref 65–140)
GLUCOSE SERPL-MCNC: 138 MG/DL (ref 65–140)
GLUCOSE SERPL-MCNC: 139 MG/DL (ref 65–140)
GLUCOSE SERPL-MCNC: 167 MG/DL (ref 65–140)
GLUCOSE SERPL-MCNC: 184 MG/DL (ref 65–140)
GLUCOSE SERPL-MCNC: 96 MG/DL (ref 65–140)
HCT VFR BLD AUTO: 34.3 % (ref 36.5–49.3)
HGB BLD-MCNC: 11.1 G/DL (ref 12–17)
MCH RBC QN AUTO: 27.8 PG (ref 26.8–34.3)
MCHC RBC AUTO-ENTMCNC: 32.4 G/DL (ref 31.4–37.4)
MCV RBC AUTO: 86 FL (ref 82–98)
PLATELET # BLD AUTO: 207 THOUSANDS/UL (ref 149–390)
PMV BLD AUTO: 10 FL (ref 8.9–12.7)
POTASSIUM SERPL-SCNC: 3.8 MMOL/L (ref 3.5–5.3)
RBC # BLD AUTO: 4 MILLION/UL (ref 3.88–5.62)
SODIUM SERPL-SCNC: 139 MMOL/L (ref 136–145)
WBC # BLD AUTO: 7.83 THOUSAND/UL (ref 4.31–10.16)

## 2017-02-14 PROCEDURE — 93306 TTE W/DOPPLER COMPLETE: CPT

## 2017-02-14 PROCEDURE — 97110 THERAPEUTIC EXERCISES: CPT

## 2017-02-14 PROCEDURE — 85730 THROMBOPLASTIN TIME PARTIAL: CPT | Performed by: NURSE PRACTITIONER

## 2017-02-14 PROCEDURE — 97530 THERAPEUTIC ACTIVITIES: CPT

## 2017-02-14 PROCEDURE — 97163 PT EVAL HIGH COMPLEX 45 MIN: CPT

## 2017-02-14 PROCEDURE — 94640 AIRWAY INHALATION TREATMENT: CPT

## 2017-02-14 PROCEDURE — G8988 SELF CARE GOAL STATUS: HCPCS

## 2017-02-14 PROCEDURE — G8979 MOBILITY GOAL STATUS: HCPCS

## 2017-02-14 PROCEDURE — 94760 N-INVAS EAR/PLS OXIMETRY 1: CPT

## 2017-02-14 PROCEDURE — 82948 REAGENT STRIP/BLOOD GLUCOSE: CPT

## 2017-02-14 PROCEDURE — G8987 SELF CARE CURRENT STATUS: HCPCS

## 2017-02-14 PROCEDURE — 80048 BASIC METABOLIC PNL TOTAL CA: CPT | Performed by: NURSE PRACTITIONER

## 2017-02-14 PROCEDURE — 85027 COMPLETE CBC AUTOMATED: CPT | Performed by: NURSE PRACTITIONER

## 2017-02-14 PROCEDURE — 71275 CT ANGIOGRAPHY CHEST: CPT

## 2017-02-14 PROCEDURE — G8978 MOBILITY CURRENT STATUS: HCPCS

## 2017-02-14 PROCEDURE — 74177 CT ABD & PELVIS W/CONTRAST: CPT

## 2017-02-14 PROCEDURE — 97167 OT EVAL HIGH COMPLEX 60 MIN: CPT

## 2017-02-14 RX ORDER — POTASSIUM CHLORIDE 20 MEQ/1
20 TABLET, EXTENDED RELEASE ORAL ONCE
Status: COMPLETED | OUTPATIENT
Start: 2017-02-14 | End: 2017-02-14

## 2017-02-14 RX ORDER — DILTIAZEM HYDROCHLORIDE 60 MG/1
180 CAPSULE, EXTENDED RELEASE ORAL EVERY 12 HOURS SCHEDULED
Status: DISCONTINUED | OUTPATIENT
Start: 2017-02-14 | End: 2017-02-16 | Stop reason: HOSPADM

## 2017-02-14 RX ORDER — HEPARIN SODIUM 5000 [USP'U]/ML
7500 INJECTION, SOLUTION INTRAVENOUS; SUBCUTANEOUS EVERY 8 HOURS SCHEDULED
Status: DISCONTINUED | OUTPATIENT
Start: 2017-02-14 | End: 2017-02-16 | Stop reason: HOSPADM

## 2017-02-14 RX ADMIN — LORATADINE 10 MG: 10 TABLET ORAL at 08:00

## 2017-02-14 RX ADMIN — Medication 1 APPLICATION: at 17:57

## 2017-02-14 RX ADMIN — PRIMIDONE 50 MG: 50 TABLET ORAL at 21:50

## 2017-02-14 RX ADMIN — OYSTER SHELL CALCIUM WITH VITAMIN D 1 TABLET: 500; 200 TABLET, FILM COATED ORAL at 08:00

## 2017-02-14 RX ADMIN — POTASSIUM CHLORIDE 20 MEQ: 1500 TABLET, EXTENDED RELEASE ORAL at 08:00

## 2017-02-14 RX ADMIN — FUROSEMIDE 80 MG: 80 TABLET ORAL at 17:54

## 2017-02-14 RX ADMIN — IOHEXOL 100 ML: 350 INJECTION, SOLUTION INTRAVENOUS at 17:06

## 2017-02-14 RX ADMIN — LEVALBUTEROL HYDROCHLORIDE 1.25 MG: 1.25 SOLUTION, CONCENTRATE RESPIRATORY (INHALATION) at 07:50

## 2017-02-14 RX ADMIN — INSULIN GLARGINE 80 UNITS: 100 INJECTION, SOLUTION SUBCUTANEOUS at 21:40

## 2017-02-14 RX ADMIN — BUPROPION HYDROCHLORIDE 100 MG: 100 TABLET, FILM COATED ORAL at 08:00

## 2017-02-14 RX ADMIN — Medication 1 APPLICATION: at 08:04

## 2017-02-14 RX ADMIN — LEVALBUTEROL HYDROCHLORIDE 1.25 MG: 1.25 SOLUTION, CONCENTRATE RESPIRATORY (INHALATION) at 20:14

## 2017-02-14 RX ADMIN — INSULIN LISPRO 1 UNITS: 100 INJECTION, SOLUTION INTRAVENOUS; SUBCUTANEOUS at 21:46

## 2017-02-14 RX ADMIN — AZTREONAM 2000 MG: 2 INJECTION, POWDER, LYOPHILIZED, FOR SOLUTION INTRAMUSCULAR; INTRAVENOUS at 01:05

## 2017-02-14 RX ADMIN — FUROSEMIDE 80 MG: 10 INJECTION, SOLUTION INTRAMUSCULAR; INTRAVENOUS at 09:52

## 2017-02-14 RX ADMIN — DILTIAZEM HYDROCHLORIDE 180 MG: 60 CAPSULE, EXTENDED RELEASE ORAL at 09:52

## 2017-02-14 RX ADMIN — IPRATROPIUM BROMIDE 0.5 MG: 0.5 SOLUTION RESPIRATORY (INHALATION) at 01:33

## 2017-02-14 RX ADMIN — FLUTICASONE PROPIONATE 2 SPRAY: 50 SPRAY, METERED NASAL at 08:04

## 2017-02-14 RX ADMIN — IPRATROPIUM BROMIDE 0.5 MG: 0.5 SOLUTION RESPIRATORY (INHALATION) at 13:20

## 2017-02-14 RX ADMIN — ACETAMINOPHEN 650 MG: 325 TABLET ORAL at 20:20

## 2017-02-14 RX ADMIN — AZTREONAM 2000 MG: 2 INJECTION, POWDER, LYOPHILIZED, FOR SOLUTION INTRAMUSCULAR; INTRAVENOUS at 09:52

## 2017-02-14 RX ADMIN — AZTREONAM 2000 MG: 2 INJECTION, POWDER, LYOPHILIZED, FOR SOLUTION INTRAMUSCULAR; INTRAVENOUS at 17:56

## 2017-02-14 RX ADMIN — ACETAMINOPHEN 650 MG: 325 TABLET ORAL at 14:09

## 2017-02-14 RX ADMIN — MONTELUKAST SODIUM 10 MG: 10 TABLET, FILM COATED ORAL at 21:40

## 2017-02-14 RX ADMIN — HEPARIN SODIUM AND DEXTROSE 24 UNITS/KG/HR: 10000; 5 INJECTION INTRAVENOUS at 14:01

## 2017-02-14 RX ADMIN — HEPARIN SODIUM 7500 UNITS: 5000 INJECTION, SOLUTION INTRAVENOUS; SUBCUTANEOUS at 21:40

## 2017-02-14 RX ADMIN — PANTOPRAZOLE SODIUM 40 MG: 40 TABLET, DELAYED RELEASE ORAL at 17:55

## 2017-02-14 RX ADMIN — PANTOPRAZOLE SODIUM 40 MG: 40 TABLET, DELAYED RELEASE ORAL at 08:00

## 2017-02-14 RX ADMIN — FLUTICASONE PROPIONATE 2 SPRAY: 50 SPRAY, METERED NASAL at 17:56

## 2017-02-14 RX ADMIN — VANCOMYCIN HYDROCHLORIDE 1500 MG: 1 INJECTION, POWDER, LYOPHILIZED, FOR SOLUTION INTRAVENOUS at 01:05

## 2017-02-14 RX ADMIN — DIGOXIN 125 MCG: 0.12 TABLET ORAL at 08:00

## 2017-02-14 RX ADMIN — HEPARIN SODIUM 5000 UNITS: 1000 INJECTION, SOLUTION INTRAVENOUS; SUBCUTANEOUS at 05:57

## 2017-02-14 RX ADMIN — FUROSEMIDE 80 MG: 80 TABLET ORAL at 07:59

## 2017-02-14 RX ADMIN — DILTIAZEM HYDROCHLORIDE 180 MG: 60 CAPSULE, EXTENDED RELEASE ORAL at 21:15

## 2017-02-14 RX ADMIN — VANCOMYCIN HYDROCHLORIDE 1500 MG: 1 INJECTION, POWDER, LYOPHILIZED, FOR SOLUTION INTRAVENOUS at 17:55

## 2017-02-14 RX ADMIN — METOPROLOL TARTRATE 25 MG: 25 TABLET ORAL at 21:15

## 2017-02-14 RX ADMIN — LEVALBUTEROL HYDROCHLORIDE 1.25 MG: 1.25 SOLUTION, CONCENTRATE RESPIRATORY (INHALATION) at 01:33

## 2017-02-14 RX ADMIN — ACETAMINOPHEN 650 MG: 325 TABLET ORAL at 08:17

## 2017-02-14 RX ADMIN — DILTIAZEM HYDROCHLORIDE 2.5 MG/HR: 5 INJECTION INTRAVENOUS at 14:01

## 2017-02-14 RX ADMIN — SENNOSIDES 8.6 MG: 8.6 TABLET, FILM COATED ORAL at 17:57

## 2017-02-14 RX ADMIN — INSULIN LISPRO 1 UNITS: 100 INJECTION, SOLUTION INTRAVENOUS; SUBCUTANEOUS at 11:31

## 2017-02-14 RX ADMIN — HEPARIN SODIUM AND DEXTROSE 24 UNITS/KG/HR: 10000; 5 INJECTION INTRAVENOUS at 05:56

## 2017-02-14 RX ADMIN — IPRATROPIUM BROMIDE 0.5 MG: 0.5 SOLUTION RESPIRATORY (INHALATION) at 20:14

## 2017-02-14 RX ADMIN — LEVALBUTEROL HYDROCHLORIDE 1.25 MG: 1.25 SOLUTION, CONCENTRATE RESPIRATORY (INHALATION) at 13:20

## 2017-02-14 RX ADMIN — METOPROLOL TARTRATE 25 MG: 25 TABLET ORAL at 14:09

## 2017-02-14 RX ADMIN — IPRATROPIUM BROMIDE 0.5 MG: 0.5 SOLUTION RESPIRATORY (INHALATION) at 07:50

## 2017-02-15 LAB
ANION GAP SERPL CALCULATED.3IONS-SCNC: 3 MMOL/L (ref 4–13)
BASOPHILS # BLD AUTO: 0.03 THOUSANDS/ΜL (ref 0–0.1)
BASOPHILS NFR BLD AUTO: 0 % (ref 0–1)
BUN SERPL-MCNC: 12 MG/DL (ref 5–25)
CALCIUM SERPL-MCNC: 9.1 MG/DL (ref 8.3–10.1)
CHLORIDE SERPL-SCNC: 98 MMOL/L (ref 100–108)
CO2 SERPL-SCNC: 38 MMOL/L (ref 21–32)
CREAT SERPL-MCNC: 0.96 MG/DL (ref 0.6–1.3)
EOSINOPHIL # BLD AUTO: 0.6 THOUSAND/ΜL (ref 0–0.61)
EOSINOPHIL NFR BLD AUTO: 8 % (ref 0–6)
ERYTHROCYTE [DISTWIDTH] IN BLOOD BY AUTOMATED COUNT: 14.6 % (ref 11.6–15.1)
GFR SERPL CREATININE-BSD FRML MDRD: >60 ML/MIN/1.73SQ M
GLUCOSE SERPL-MCNC: 124 MG/DL (ref 65–140)
GLUCOSE SERPL-MCNC: 127 MG/DL (ref 65–140)
GLUCOSE SERPL-MCNC: 134 MG/DL (ref 65–140)
GLUCOSE SERPL-MCNC: 139 MG/DL (ref 65–140)
GLUCOSE SERPL-MCNC: 174 MG/DL (ref 65–140)
HCT VFR BLD AUTO: 33.6 % (ref 36.5–49.3)
HGB BLD-MCNC: 10.9 G/DL (ref 12–17)
LYMPHOCYTES # BLD AUTO: 1.55 THOUSANDS/ΜL (ref 0.6–4.47)
LYMPHOCYTES NFR BLD AUTO: 21 % (ref 14–44)
MAGNESIUM SERPL-MCNC: 1.7 MG/DL (ref 1.6–2.6)
MCH RBC QN AUTO: 28 PG (ref 26.8–34.3)
MCHC RBC AUTO-ENTMCNC: 32.4 G/DL (ref 31.4–37.4)
MCV RBC AUTO: 86 FL (ref 82–98)
MONOCYTES # BLD AUTO: 0.43 THOUSAND/ΜL (ref 0.17–1.22)
MONOCYTES NFR BLD AUTO: 6 % (ref 4–12)
NEUTROPHILS # BLD AUTO: 4.71 THOUSANDS/ΜL (ref 1.85–7.62)
NEUTS SEG NFR BLD AUTO: 64 % (ref 43–75)
NRBC BLD AUTO-RTO: 0 /100 WBCS
PLATELET # BLD AUTO: 223 THOUSANDS/UL (ref 149–390)
PMV BLD AUTO: 10.6 FL (ref 8.9–12.7)
POTASSIUM SERPL-SCNC: 3.6 MMOL/L (ref 3.5–5.3)
RBC # BLD AUTO: 3.89 MILLION/UL (ref 3.88–5.62)
SODIUM SERPL-SCNC: 139 MMOL/L (ref 136–145)
VANCOMYCIN TROUGH SERPL-MCNC: 22.1 UG/ML (ref 10–20)
WBC # BLD AUTO: 7.37 THOUSAND/UL (ref 4.31–10.16)

## 2017-02-15 PROCEDURE — 83735 ASSAY OF MAGNESIUM: CPT | Performed by: PHYSICIAN ASSISTANT

## 2017-02-15 PROCEDURE — 80202 ASSAY OF VANCOMYCIN: CPT | Performed by: NURSE PRACTITIONER

## 2017-02-15 PROCEDURE — 94760 N-INVAS EAR/PLS OXIMETRY 1: CPT

## 2017-02-15 PROCEDURE — 94640 AIRWAY INHALATION TREATMENT: CPT

## 2017-02-15 PROCEDURE — 82948 REAGENT STRIP/BLOOD GLUCOSE: CPT

## 2017-02-15 PROCEDURE — 97530 THERAPEUTIC ACTIVITIES: CPT

## 2017-02-15 PROCEDURE — 80048 BASIC METABOLIC PNL TOTAL CA: CPT | Performed by: PHYSICIAN ASSISTANT

## 2017-02-15 PROCEDURE — 97535 SELF CARE MNGMENT TRAINING: CPT

## 2017-02-15 PROCEDURE — 97110 THERAPEUTIC EXERCISES: CPT

## 2017-02-15 PROCEDURE — 85025 COMPLETE CBC W/AUTO DIFF WBC: CPT | Performed by: PHYSICIAN ASSISTANT

## 2017-02-15 RX ORDER — SODIUM CHLORIDE FOR INHALATION 0.9 %
VIAL, NEBULIZER (ML) INHALATION
Status: DISPENSED
Start: 2017-02-15 | End: 2017-02-16

## 2017-02-15 RX ORDER — LACTULOSE 20 G/30ML
20 SOLUTION ORAL 3 TIMES DAILY
Status: DISCONTINUED | OUTPATIENT
Start: 2017-02-15 | End: 2017-02-16 | Stop reason: HOSPADM

## 2017-02-15 RX ORDER — IBUPROFEN 400 MG/1
800 TABLET ORAL EVERY 8 HOURS PRN
Status: DISCONTINUED | OUTPATIENT
Start: 2017-02-15 | End: 2017-02-16 | Stop reason: HOSPADM

## 2017-02-15 RX ORDER — DABIGATRAN ETEXILATE 150 MG/1
150 CAPSULE, COATED PELLETS ORAL 2 TIMES DAILY
Status: DISCONTINUED | OUTPATIENT
Start: 2017-02-15 | End: 2017-02-16 | Stop reason: HOSPADM

## 2017-02-15 RX ORDER — SODIUM CHLORIDE FOR INHALATION 0.9 %
VIAL, NEBULIZER (ML) INHALATION
Status: COMPLETED
Start: 2017-02-15 | End: 2017-02-15

## 2017-02-15 RX ORDER — SPIRONOLACTONE 100 MG/1
100 TABLET, FILM COATED ORAL DAILY
Status: DISCONTINUED | OUTPATIENT
Start: 2017-02-15 | End: 2017-02-16 | Stop reason: HOSPADM

## 2017-02-15 RX ORDER — CYCLOBENZAPRINE HCL 10 MG
10 TABLET ORAL 3 TIMES DAILY PRN
Status: DISCONTINUED | OUTPATIENT
Start: 2017-02-15 | End: 2017-02-16 | Stop reason: HOSPADM

## 2017-02-15 RX ORDER — POTASSIUM CHLORIDE 20 MEQ/1
40 TABLET, EXTENDED RELEASE ORAL 2 TIMES DAILY
Status: COMPLETED | OUTPATIENT
Start: 2017-02-15 | End: 2017-02-15

## 2017-02-15 RX ORDER — MAGNESIUM SULFATE HEPTAHYDRATE 40 MG/ML
4 INJECTION, SOLUTION INTRAVENOUS ONCE
Status: COMPLETED | OUTPATIENT
Start: 2017-02-15 | End: 2017-02-15

## 2017-02-15 RX ORDER — LIDOCAINE 50 MG/G
1 PATCH TOPICAL DAILY
Status: DISCONTINUED | OUTPATIENT
Start: 2017-02-15 | End: 2017-02-16 | Stop reason: HOSPADM

## 2017-02-15 RX ORDER — POLYETHYLENE GLYCOL 3350 17 G/17G
17 POWDER, FOR SOLUTION ORAL DAILY
Status: DISCONTINUED | OUTPATIENT
Start: 2017-02-15 | End: 2017-02-16 | Stop reason: HOSPADM

## 2017-02-15 RX ADMIN — LEVALBUTEROL HYDROCHLORIDE 1.25 MG: 1.25 SOLUTION, CONCENTRATE RESPIRATORY (INHALATION) at 19:34

## 2017-02-15 RX ADMIN — LIDOCAINE 1 PATCH: 50 PATCH CUTANEOUS at 08:35

## 2017-02-15 RX ADMIN — SENNOSIDES 8.6 MG: 8.6 TABLET, FILM COATED ORAL at 18:02

## 2017-02-15 RX ADMIN — FLUTICASONE PROPIONATE 2 SPRAY: 50 SPRAY, METERED NASAL at 08:37

## 2017-02-15 RX ADMIN — LEVALBUTEROL HYDROCHLORIDE 1.25 MG: 1.25 SOLUTION, CONCENTRATE RESPIRATORY (INHALATION) at 01:42

## 2017-02-15 RX ADMIN — LORATADINE 10 MG: 10 TABLET ORAL at 08:35

## 2017-02-15 RX ADMIN — DILTIAZEM HYDROCHLORIDE 180 MG: 60 CAPSULE, EXTENDED RELEASE ORAL at 20:59

## 2017-02-15 RX ADMIN — LEVALBUTEROL HYDROCHLORIDE 1.25 MG: 1.25 SOLUTION, CONCENTRATE RESPIRATORY (INHALATION) at 13:29

## 2017-02-15 RX ADMIN — DABIGATRAN ETEXILATE MESYLATE 150 MG: 150 CAPSULE ORAL at 09:06

## 2017-02-15 RX ADMIN — SENNOSIDES 8.6 MG: 8.6 TABLET, FILM COATED ORAL at 08:35

## 2017-02-15 RX ADMIN — METOPROLOL TARTRATE 25 MG: 25 TABLET ORAL at 08:35

## 2017-02-15 RX ADMIN — FUROSEMIDE 80 MG: 80 TABLET ORAL at 16:16

## 2017-02-15 RX ADMIN — Medication 1 APPLICATION: at 18:02

## 2017-02-15 RX ADMIN — AZTREONAM 2000 MG: 2 INJECTION, POWDER, LYOPHILIZED, FOR SOLUTION INTRAMUSCULAR; INTRAVENOUS at 01:36

## 2017-02-15 RX ADMIN — Medication 1 APPLICATION: at 08:37

## 2017-02-15 RX ADMIN — IPRATROPIUM BROMIDE 0.5 MG: 0.5 SOLUTION RESPIRATORY (INHALATION) at 07:34

## 2017-02-15 RX ADMIN — IBUPROFEN 800 MG: 400 TABLET ORAL at 21:05

## 2017-02-15 RX ADMIN — IPRATROPIUM BROMIDE 0.5 MG: 0.5 SOLUTION RESPIRATORY (INHALATION) at 01:42

## 2017-02-15 RX ADMIN — HEPARIN SODIUM 7500 UNITS: 5000 INJECTION, SOLUTION INTRAVENOUS; SUBCUTANEOUS at 22:00

## 2017-02-15 RX ADMIN — LEVALBUTEROL HYDROCHLORIDE 1.25 MG: 1.25 SOLUTION, CONCENTRATE RESPIRATORY (INHALATION) at 07:34

## 2017-02-15 RX ADMIN — VANCOMYCIN HYDROCHLORIDE 1500 MG: 1 INJECTION, POWDER, LYOPHILIZED, FOR SOLUTION INTRAVENOUS at 08:36

## 2017-02-15 RX ADMIN — PANTOPRAZOLE SODIUM 40 MG: 40 TABLET, DELAYED RELEASE ORAL at 08:35

## 2017-02-15 RX ADMIN — POTASSIUM CHLORIDE 40 MEQ: 1500 TABLET, EXTENDED RELEASE ORAL at 10:12

## 2017-02-15 RX ADMIN — IBUPROFEN 800 MG: 400 TABLET ORAL at 16:17

## 2017-02-15 RX ADMIN — HEPARIN SODIUM 7500 UNITS: 5000 INJECTION, SOLUTION INTRAVENOUS; SUBCUTANEOUS at 14:14

## 2017-02-15 RX ADMIN — MENTHOL, METHYL SALICYLATE 1 APPLICATION: 10; 15 CREAM TOPICAL at 08:59

## 2017-02-15 RX ADMIN — METOPROLOL TARTRATE 25 MG: 25 TABLET ORAL at 21:00

## 2017-02-15 RX ADMIN — SPIRONOLACTONE 100 MG: 100 TABLET ORAL at 09:07

## 2017-02-15 RX ADMIN — ISODIUM CHLORIDE 3 ML: 0.03 SOLUTION RESPIRATORY (INHALATION) at 19:34

## 2017-02-15 RX ADMIN — FUROSEMIDE 80 MG: 80 TABLET ORAL at 08:35

## 2017-02-15 RX ADMIN — OYSTER SHELL CALCIUM WITH VITAMIN D 1 TABLET: 500; 200 TABLET, FILM COATED ORAL at 08:35

## 2017-02-15 RX ADMIN — DABIGATRAN ETEXILATE MESYLATE 150 MG: 150 CAPSULE ORAL at 18:02

## 2017-02-15 RX ADMIN — VANCOMYCIN HYDROCHLORIDE 1500 MG: 1 INJECTION, POWDER, LYOPHILIZED, FOR SOLUTION INTRAVENOUS at 01:21

## 2017-02-15 RX ADMIN — INSULIN LISPRO 1 UNITS: 100 INJECTION, SOLUTION INTRAVENOUS; SUBCUTANEOUS at 21:59

## 2017-02-15 RX ADMIN — MENTHOL 5.4 MG: 5.4 LOZENGE ORAL at 12:29

## 2017-02-15 RX ADMIN — INSULIN GLARGINE 80 UNITS: 100 INJECTION, SOLUTION SUBCUTANEOUS at 21:58

## 2017-02-15 RX ADMIN — DILTIAZEM HYDROCHLORIDE 180 MG: 60 CAPSULE, EXTENDED RELEASE ORAL at 08:35

## 2017-02-15 RX ADMIN — MONTELUKAST SODIUM 10 MG: 10 TABLET, FILM COATED ORAL at 22:02

## 2017-02-15 RX ADMIN — Medication 1 APPLICATION: at 22:04

## 2017-02-15 RX ADMIN — PRIMIDONE 50 MG: 50 TABLET ORAL at 22:02

## 2017-02-15 RX ADMIN — MENTHOL, METHYL SALICYLATE 1 APPLICATION: 10; 15 CREAM TOPICAL at 18:02

## 2017-02-15 RX ADMIN — FLUTICASONE PROPIONATE 2 SPRAY: 50 SPRAY, METERED NASAL at 18:02

## 2017-02-15 RX ADMIN — MAGNESIUM SULFATE HEPTAHYDRATE 4 G: 40 INJECTION, SOLUTION INTRAVENOUS at 08:36

## 2017-02-15 RX ADMIN — TIOTROPIUM BROMIDE 18 MCG: 18 CAPSULE ORAL; RESPIRATORY (INHALATION) at 10:13

## 2017-02-15 RX ADMIN — BUPROPION HYDROCHLORIDE 100 MG: 100 TABLET, FILM COATED ORAL at 08:36

## 2017-02-15 RX ADMIN — HEPARIN SODIUM 7500 UNITS: 5000 INJECTION, SOLUTION INTRAVENOUS; SUBCUTANEOUS at 06:07

## 2017-02-15 RX ADMIN — IBUPROFEN 800 MG: 400 TABLET ORAL at 08:35

## 2017-02-15 RX ADMIN — DIGOXIN 125 MCG: 0.12 TABLET ORAL at 08:36

## 2017-02-15 RX ADMIN — PANTOPRAZOLE SODIUM 40 MG: 40 TABLET, DELAYED RELEASE ORAL at 16:16

## 2017-02-15 RX ADMIN — POTASSIUM CHLORIDE 40 MEQ: 1500 TABLET, EXTENDED RELEASE ORAL at 18:02

## 2017-02-16 VITALS
BODY MASS INDEX: 45.1 KG/M2 | OXYGEN SATURATION: 99 % | HEART RATE: 83 BPM | SYSTOLIC BLOOD PRESSURE: 118 MMHG | DIASTOLIC BLOOD PRESSURE: 82 MMHG | HEIGHT: 70 IN | RESPIRATION RATE: 22 BRPM | WEIGHT: 315 LBS | TEMPERATURE: 98.3 F

## 2017-02-16 LAB
ANION GAP SERPL CALCULATED.3IONS-SCNC: 4 MMOL/L (ref 4–13)
BACTERIA BLD CULT: NORMAL
BASOPHILS # BLD AUTO: 0.04 THOUSANDS/ΜL (ref 0–0.1)
BASOPHILS NFR BLD AUTO: 1 % (ref 0–1)
BUN SERPL-MCNC: 13 MG/DL (ref 5–25)
CALCIUM SERPL-MCNC: 9.1 MG/DL (ref 8.3–10.1)
CHLORIDE SERPL-SCNC: 98 MMOL/L (ref 100–108)
CO2 SERPL-SCNC: 37 MMOL/L (ref 21–32)
CREAT SERPL-MCNC: 0.96 MG/DL (ref 0.6–1.3)
EOSINOPHIL # BLD AUTO: 0.56 THOUSAND/ΜL (ref 0–0.61)
EOSINOPHIL NFR BLD AUTO: 7 % (ref 0–6)
ERYTHROCYTE [DISTWIDTH] IN BLOOD BY AUTOMATED COUNT: 14.6 % (ref 11.6–15.1)
GFR SERPL CREATININE-BSD FRML MDRD: >60 ML/MIN/1.73SQ M
GLUCOSE SERPL-MCNC: 139 MG/DL (ref 65–140)
GLUCOSE SERPL-MCNC: 93 MG/DL (ref 65–140)
GLUCOSE SERPL-MCNC: 98 MG/DL (ref 65–140)
GRAM STN SPEC: NORMAL
HCT VFR BLD AUTO: 34.8 % (ref 36.5–49.3)
HGB BLD-MCNC: 11.2 G/DL (ref 12–17)
LYMPHOCYTES # BLD AUTO: 1.36 THOUSANDS/ΜL (ref 0.6–4.47)
LYMPHOCYTES NFR BLD AUTO: 18 % (ref 14–44)
MAGNESIUM SERPL-MCNC: 1.9 MG/DL (ref 1.6–2.6)
MCH RBC QN AUTO: 28 PG (ref 26.8–34.3)
MCHC RBC AUTO-ENTMCNC: 32.2 G/DL (ref 31.4–37.4)
MCV RBC AUTO: 87 FL (ref 82–98)
MONOCYTES # BLD AUTO: 0.45 THOUSAND/ΜL (ref 0.17–1.22)
MONOCYTES NFR BLD AUTO: 6 % (ref 4–12)
NEUTROPHILS # BLD AUTO: 5.15 THOUSANDS/ΜL (ref 1.85–7.62)
NEUTS SEG NFR BLD AUTO: 68 % (ref 43–75)
NRBC BLD AUTO-RTO: 0 /100 WBCS
PLATELET # BLD AUTO: 214 THOUSANDS/UL (ref 149–390)
PMV BLD AUTO: 10.6 FL (ref 8.9–12.7)
POTASSIUM SERPL-SCNC: 3.9 MMOL/L (ref 3.5–5.3)
RBC # BLD AUTO: 4 MILLION/UL (ref 3.88–5.62)
SODIUM SERPL-SCNC: 139 MMOL/L (ref 136–145)
WBC # BLD AUTO: 7.61 THOUSAND/UL (ref 4.31–10.16)

## 2017-02-16 PROCEDURE — 83735 ASSAY OF MAGNESIUM: CPT | Performed by: PHYSICIAN ASSISTANT

## 2017-02-16 PROCEDURE — 94640 AIRWAY INHALATION TREATMENT: CPT

## 2017-02-16 PROCEDURE — 82948 REAGENT STRIP/BLOOD GLUCOSE: CPT

## 2017-02-16 PROCEDURE — 80048 BASIC METABOLIC PNL TOTAL CA: CPT | Performed by: PHYSICIAN ASSISTANT

## 2017-02-16 PROCEDURE — 97530 THERAPEUTIC ACTIVITIES: CPT

## 2017-02-16 PROCEDURE — 97110 THERAPEUTIC EXERCISES: CPT

## 2017-02-16 PROCEDURE — 94760 N-INVAS EAR/PLS OXIMETRY 1: CPT

## 2017-02-16 PROCEDURE — 85025 COMPLETE CBC W/AUTO DIFF WBC: CPT | Performed by: PHYSICIAN ASSISTANT

## 2017-02-16 PROCEDURE — 97535 SELF CARE MNGMENT TRAINING: CPT

## 2017-02-16 RX ORDER — HALOPERIDOL 5 MG/ML
2.5 INJECTION INTRAMUSCULAR ONCE
Status: DISCONTINUED | OUTPATIENT
Start: 2017-02-16 | End: 2017-02-16

## 2017-02-16 RX ORDER — SODIUM CHLORIDE FOR INHALATION 0.9 %
3 VIAL, NEBULIZER (ML) INHALATION
Status: DISCONTINUED | OUTPATIENT
Start: 2017-02-16 | End: 2017-02-16 | Stop reason: HOSPADM

## 2017-02-16 RX ORDER — DILTIAZEM HYDROCHLORIDE 90 MG/1
180 CAPSULE, EXTENDED RELEASE ORAL EVERY 12 HOURS SCHEDULED
Qty: 120 CAPSULE | Refills: 0 | Status: SHIPPED | OUTPATIENT
Start: 2017-02-16 | End: 2018-07-20 | Stop reason: ALTCHOICE

## 2017-02-16 RX ADMIN — LIDOCAINE 1 PATCH: 50 PATCH CUTANEOUS at 08:25

## 2017-02-16 RX ADMIN — LEVALBUTEROL HYDROCHLORIDE 1.25 MG: 1.25 SOLUTION, CONCENTRATE RESPIRATORY (INHALATION) at 02:04

## 2017-02-16 RX ADMIN — IBUPROFEN 800 MG: 400 TABLET ORAL at 08:52

## 2017-02-16 RX ADMIN — MENTHOL, METHYL SALICYLATE 1 APPLICATION: 10; 15 CREAM TOPICAL at 08:21

## 2017-02-16 RX ADMIN — TIOTROPIUM BROMIDE 18 MCG: 18 CAPSULE ORAL; RESPIRATORY (INHALATION) at 08:24

## 2017-02-16 RX ADMIN — LORATADINE 10 MG: 10 TABLET ORAL at 08:25

## 2017-02-16 RX ADMIN — MENTHOL 5.4 MG: 5.4 LOZENGE ORAL at 09:00

## 2017-02-16 RX ADMIN — LEVALBUTEROL HYDROCHLORIDE 1.25 MG: 1.25 SOLUTION, CONCENTRATE RESPIRATORY (INHALATION) at 11:25

## 2017-02-16 RX ADMIN — DABIGATRAN ETEXILATE MESYLATE 150 MG: 150 CAPSULE ORAL at 08:25

## 2017-02-16 RX ADMIN — OYSTER SHELL CALCIUM WITH VITAMIN D 1 TABLET: 500; 200 TABLET, FILM COATED ORAL at 08:25

## 2017-02-16 RX ADMIN — DIGOXIN 125 MCG: 0.12 TABLET ORAL at 08:25

## 2017-02-16 RX ADMIN — SPIRONOLACTONE 100 MG: 100 TABLET ORAL at 08:24

## 2017-02-16 RX ADMIN — DILTIAZEM HYDROCHLORIDE 180 MG: 60 CAPSULE, EXTENDED RELEASE ORAL at 08:24

## 2017-02-16 RX ADMIN — METOPROLOL TARTRATE 25 MG: 25 TABLET ORAL at 08:24

## 2017-02-16 RX ADMIN — Medication 1 APPLICATION: at 08:21

## 2017-02-16 RX ADMIN — FLUTICASONE PROPIONATE 2 SPRAY: 50 SPRAY, METERED NASAL at 08:24

## 2017-02-16 RX ADMIN — PANTOPRAZOLE SODIUM 40 MG: 40 TABLET, DELAYED RELEASE ORAL at 08:24

## 2017-02-16 RX ADMIN — HEPARIN SODIUM 7500 UNITS: 5000 INJECTION, SOLUTION INTRAVENOUS; SUBCUTANEOUS at 06:07

## 2017-02-16 RX ADMIN — ISODIUM CHLORIDE 3 ML: 0.03 SOLUTION RESPIRATORY (INHALATION) at 02:03

## 2017-02-16 RX ADMIN — BUPROPION HYDROCHLORIDE 100 MG: 100 TABLET, FILM COATED ORAL at 10:02

## 2017-02-16 RX ADMIN — FUROSEMIDE 80 MG: 80 TABLET ORAL at 08:19

## 2017-02-16 RX ADMIN — ISODIUM CHLORIDE 3 ML: 0.03 SOLUTION RESPIRATORY (INHALATION) at 11:26

## 2017-02-18 LAB — BACTERIA BLD CULT: NORMAL

## 2018-01-09 ENCOUNTER — ALLSCRIPTS OFFICE VISIT (OUTPATIENT)
Dept: OTHER | Facility: OTHER | Age: 48
End: 2018-01-09

## 2018-01-10 NOTE — CONSULTS
Assessment   1  Preoperative cardiovascular examination (V72 81) (Z01 810)   2  Atrial fibrillation (427 31) (I48 91)   3  Morbid obesity (278 01) (E66 01)    Plan   Atrial fibrillation    · EKG/ECG- POC; Status:Complete;   Done: 85LHD8052   Perform: In Office; FQD:28JGS9859; Last Updated By:Mylene Breaux; 1/9/2018 2:34:54 PM;Ordered; For:Atrial fibrillation; Ordered By:Shay Rodriguez;   · Continue with our present treatment plan ; Status:Complete;   Done: 69ADV5205   Ordered; For:Atrial fibrillation; Ordered By:Shay Rodriguez;   · Follow-up visit in 6 months Evaluation and Treatment  Follow-up  Status: Complete     Done: 57FHE4082   Ordered; For: Atrial fibrillation; Ordered By: Jimi Faustin Performed:  Due: 94EZL7418; Last Updated By: Angelica Harris; 1/9/2018 3:16:13 PM    Discussion/Summary   Surgical Clearance: He is at a LOW TO MODERATE risk from a cardiovascular standpoint at this time without any additional cardiac testing  Reevaluation needed, if he should present with symptoms prior to surgery/procedure  Comments:  Continue rate controlling medications for atrial fibrillation perioperatively  Okay to hold Pradaxa preoperatively  Preoperative risk assessment - His overall risk appears to be at least in the intermediate range  But this is including both cardiovascular and pulmonary, along with his other multiple comorbidities  From a cardiovascular standpoint he is lower, and could proceed  Certainly, he is in need of this bariatric surgery and we suggest proceeding without further testing  Continue rate controlling atrial fibrillation medications perioperatively, and it is okay from our standpoint for him to hold Pradaxa preoperatively  Chronic/persistent atrial fibrillation - His rates are under adequate control  No changes were made to his medical therapy  We will bring him back in 6 months  If his weight loss surgery is successful, once he has lost weight we will consider updated testing  Echocardiograms in the past have been uninterpretable  He is on Pradaxa for stroke prevention and a history of a DVT  Obesity hypoventilation syndrome and right-sided CHF/edema - management of this per Pulmonary as he is on BiPAP currently at night  Continue the same dose of Lasix  Most of his medications will have to be adjusted if he does significantly dropped weight postoperatively  Chief Complaint   Patient is here for a consult and clearance for Bariatrics surgery  EKG was completed today      History of Present Illness   Cardiology HPI Free Text Note Form [facility]: Mr Yvonne Jordan comes in for a preoperative cardiovascular risk assessment for upcoming bariatric surgery  Stuart Feliciano has a long history of morbid obesity, with weight is up to 600 lb, and he is currently going through a bariatric surgery program out of [facility]  His cardiac history includes chronic/persistent atrial fibrillation that has been rate controlled  He tells me he has not seen a cardiologist in many years  He also has chronic lower extremity edema, prior DVTs and COPD  I suspect that some of his chronic lung disease is extrinsic from his morbid obesity  He is on home oxygen and will resides at a local nursing facility  We did see him in consultation during a hospitalization for generalized weakness and tremors  We were consulted secondary to his atrial fibrillation  Echocardiograms performed have been for the most part uninterpretable  He does have obesity hypoventilation syndrome, and even had apneic episodes on CPAP  Pulmonary has adjusted this in the past, appears now to be on BiPAP  States that he will occasionally get palpitations or feelings of tachycardia  However, this is few and far between  He has chronic shortness of breath  He is essentially bed-bound  Denies chest pain or any symptoms of angina  He has chronic volume overload and lymphedema  Atrial Fibrillation (Follow-Up):  The patient presents with persistent atrial fibrillation  He states his atrial fibrillation has been stable since the last visit  He has no significant interval events  Symptoms: stable palpitations,-- denies chest pain,-- stable dyspnea on exertion-- and-- denies dizziness  Associated symptoms include no syncope,-- no focal neurologic deficit,-- no tendency for easy bleeding-- and-- no tendency for easy bruising  Medications: the patient is adherent with his medication regimen  -- He denies medication side effects  Review of Systems           Cardiac: has swelling in the -- and-- palpitations present , but-- as noted in HPI  Skin: No complaints of nonhealing sores or skin rash  Genitourinary: No complaints of recurrent urinary tract infections, frequent urination at night, difficult urination, blood in urine, kidney stones, loss of bladder control, no kidney or prostate problems, no erectile dysfunction  Psychological: No complaints of feeling depressed, anxiety, panic attacks, or difficulty concentrating  General: lack of energy/fatigue, but-- as noted in HPI  Respiratory: shortness of breath, but-- as noted in HPI  HEENT: No complaints of serious problems, hearing problems, nose problems, throat problems, or snoring  Gastrointestinal: No complaints of liver problems, nausea, vomiting, heartburn, constipation, bloody stools, diarrhea, problems swallowing, adbominal pain, or rectal bleeding  Hematologic: No complaints of bleeding disorders, anemia, blood clots, or excessive brusing  Neurological: No complaints of numbness, tingling, dizziness, weakness, seizures, headaches, syncope or fainting, AM fatigue, daytime sleepiness, no witnessed apnea episodes  Musculoskeletal: as noted in HPI-- and-- no swelling/pain       ROS reviewed  Active Problems   1  Allergic rhinitis (477 9) (J30 9)   2  Atrial fibrillation (427 31) (I48 91)   3  Depression (311) (F32 9)   4  Diabetes (250 00) (E11 9)   5  DVT (deep venous thrombosis) (453 40) (I82 409)   6  Leg swelling (729 81) (M79 89)   7  Muscle spasm (728 85) (M62 838)   8  Neuropathy, peripheral (356 9) (G62 9)    Past Medical History      The active problems and past medical history were reviewed and updated today  Surgical History      The surgical history was reviewed and updated today  Family History   Mother    · No pertinent family history  Family History Reviewed: The family history was reviewed and updated today  Social History    · Never a smoker  The social history was reviewed and updated today  The social history was reviewed and is unchanged  Current Meds    1  Acetaminophen-Codeine #3 300-30 MG Oral Tablet; Therapy: 66JLW9271 to (Last Rx:08Jan2011)  Requested for: 43ZML6965 Ordered   2  Amoxicillin-Pot Clavulanate 875-125 MG Oral Tablet; Therapy: 00SBC9263 to (Last Rx:38Jmg1110)  Requested for: 16Dca8111 Ordered   3  Bisoprolol-Hydrochlorothiazide 10-6 25 MG Oral Tablet; Therapy: 16WRT4180 to (Last JF:33EYN8260)  Requested for: 41DQG1773 Ordered   4  BuPROPion HCl - 100 MG Oral Tablet; TAKE 1 TABLET DAILY; Therapy: 27Qil3891 to (Evaluate:22Jan2017)  Requested for: 25CNJ6631; Last     Rx:83Uer0393 Ordered   5  Ciprofloxacin HCl - 500 MG Oral Tablet; Therapy: 39SMB2956 to (Last [facility])  Requested for: 90ZDL0170 Ordered   6  Cyclobenzaprine HCl - 10 MG Oral Tablet; TAKE 1 TABLET 3 TIMES DAILY AS NEEDED; Therapy: 37Gzb2245 to (Evaluate:07Jan2017)  Requested for: 25XQP5719; Last     Rx:72Rzb5200 Ordered   7  Digoxin 125 MCG Oral Tablet; take 1 tablet by mouth every day; Therapy: 97Xke0084 to (Evaluate:23Mar2017)  Requested for: 98Jtz4536; Last     Rx:89Cat5365 Ordered   8  DilTIAZem HCl - 120 MG Oral Tablet; Take 1 5 tablets by mouth twice daily; Therapy: 28Ebc0242 to (Last Rx:75Mwt3976)  Requested for: 83HRM3209 Ordered   9  Doxycycline Hyclate 100 MG Oral Capsule;      Therapy: 63XBM2242 to (Last Rx:90Jzb5366)  Requested for: 76DIQ2969 Ordered   10  Doxycycline Hyclate 100 MG Oral Tablet; Therapy: 51ZAD8877 to (Last Rx:01Nov2011)  Requested for: 38GUV4009 Ordered   11  Furosemide 40 MG Oral Tablet; Therapy: 40MXK8567 to (Last IM:21COW4325)  Requested for: 22OQN8942 Ordered   12  Gabapentin 300 MG Oral Capsule; TAKE ONE CAPSULE BY MOUTH TWICE DAILY; Therapy: 08Sqo1562 to (Evaluate:22Apr2017)  Requested for: 13Bdg5582; Last      Rx:22Pwq9248 Ordered   13  Hydrocodone-Acetaminophen 5-325 MG Oral Tablet; Therapy: 67CMD1067 to (Last Rx:03Oct2011)  Requested for: 06PUC1088 Ordered   14  Hydrocodone-Acetaminophen 5-500 MG Oral Tablet; Therapy: 09CKS1141 to (Last [facility])  Requested for: 68Sdo3333 Ordered   15  Ibuprofen 600 MG Oral Tablet; TAKE 1 TABLET 4 TIMES DAILY  WITH MEALS AS      NEEDED; Therapy: 61Gve2587 to (Evaluate:07Jan2017)  Requested for: 87Wte1376; Last      Rx:29Uve2685 Ordered   16  Klor-Con M20 20 MEQ Oral Tablet Extended Release; Therapy: 96VND8596 to (Last Rx:03Oct2011)  Requested for: 03Oct2011 Ordered   17  Lantus 100 UNIT/ML Subcutaneous Solution; INJECT 80 UNIT Bedtime; Therapy: 62Pil0725 to (Last Rx:25Ncw0197)  Requested for: 47AEV7348 Ordered   18  Montelukast Sodium 10 MG Oral Tablet; take 1 tablet by mouth once daily; Therapy: 09Yrh6959 to (Last Rx:51Umr9310)  Requested for: 73UXE9068 Ordered   19  Mupirocin 2 % External Ointment; Therapy: 53MJV5874 to (Last Rx:29Mar2011)  Requested for: 58TXI7273 Ordered   20  Pradaxa 150 MG Oral Capsule; TAKE 1 CAPSULE TWICE DAILY; Therapy: 89Atu4273 to (Evaluate:22Jan2017)  Requested for: 75FNP9019; Last      Rx:71Fir6504 Ordered   21  Sertraline HCl - 100 MG Oral Tablet; Therapy: 91QRC1641 to (Last Rx:31Jan2011)  Requested for: 31Jan2011 Ordered   22  Spironolactone 100 MG Oral Tablet; TAKE 1 TABLET DAILY;       Therapy: 39Zrn9357 to (Evaluate:22Jan2017)  Requested for: 51LCQ3961; Last      Rx:49Bna9005 Ordered   23  Sulfamethoxazole-TMP -160 MG TABS; Therapy: 55HXE5242 to (Last Rx:03Oct2011)  Requested for: 02KNE5166 Ordered   24  TraZODone HCl - 50 MG Oral Tablet; Therapy: 84RXJ2106 to (Last Rx:30Jan2011)  Requested for: 12HAE3177 Ordered     The medication list was reviewed and updated today  Allergies   1  No Known Drug Allergies    Vitals   Signs   Heart Rate: 698  Systolic: 538, LUE, Sitting  Diastolic: 78, LUE, Sitting  BP Cuff Size: Extra-Large  Height: 5 ft 8 in  Weight: 550 lb   BMI Calculated: 83 63  BSA Calculated: 3 14  O2 Saturation: 98    Physical Exam        Constitutional      General appearance: Abnormal   chronically ill-- and-- morbidly obese  Eyes      Conjunctiva and Sclera examination: Conjunctiva pink, sclera anicteric  Ears, Nose, Mouth, and Throat - Oropharynx: Clear, nares are clear, mucous membranes are moist       Neck      Neck and thyroid: Normal, supple, trachea midline, no thyromegaly  Pulmonary      Respiratory effort: No increased work of breathing or signs of respiratory distress  Auscultation of lungs: Abnormal   Auscultation of the lungs revealed decreased breath sounds diffusely  no rales or crackles were heard bilaterally  no rhonchi  no wheezing  Cardiovascular      Auscultation of heart: Abnormal   The heart rate was normal  The rhythm was irregularly irregular  Heart sounds: the heart sounds were distant, but-- normal S1-- and-- normal S2  no murmurs were heard  Carotid pulses: Normal, 2+ bilaterally  Peripheral vascular exam: Normal pulses throughout, no tenderness, erythema or swelling  Pedal pulses: Normal, 2+ bilaterally  Examination of extremities for edema and/or varicosities: Abnormal   nonpitting edema present,-- bilateral ankle 4+ pitting edema-- and-- bilateral pretibial 4+ pitting edema  Abdomen      Abdomen: Non-tender and no distention         Liver and spleen: No hepatomegaly or splenomegaly  Musculoskeletal Gait and station: Normal gait  -- Digits and nails: Normal without clubbing or cyanosis  -- Inspection/palpation of joints, bones, and muscles: Normal, ROM normal        Skin - Skin and subcutaneous tissue: Normal without rashes or lesions  Skin is warm and well perfused, normal turgor  Neurologic - Cranial nerves: II - XII intact  -- Speech: Normal        Psychiatric - Orientation to person, place, and time: Normal -- Mood and affect: Normal       End of Encounter Meds   1  Montelukast Sodium 10 MG Oral Tablet; take 1 tablet by mouth once daily; Therapy: 23Dec2016 to (Last Rx:23Dec2016)  Requested for: 70PWK4873 Ordered  2  Digoxin 125 MCG Oral Tablet; take 1 tablet by mouth every day; Therapy: 23Dec2016 to (Evaluate:23Mar2017)  Requested for: 56Idt2585; Last     Rx:23Dec2016 Ordered   3  DilTIAZem HCl - 120 MG Oral Tablet (Cardizem); Take 1 5 tablets by mouth twice daily; Therapy: 23Dec2016 to (Last Rx:23Dec2016)  Requested for: 16QGG3881 Ordered  4  BuPROPion HCl - 100 MG Oral Tablet; TAKE 1 TABLET DAILY; Therapy: 23Dec2016 to (Evaluate:22Jan2017)  Requested for: 18JEZ0440; Last     Rx:23Dec2016 Ordered  5  Lantus 100 UNIT/ML Subcutaneous Solution; INJECT 80 UNIT Bedtime; Therapy: 23Dec2016 to (Last Rx:23Dec2016)  Requested for: 97ZMT0197 Ordered  6  Pradaxa 150 MG Oral Capsule; TAKE 1 CAPSULE TWICE DAILY; Therapy: 23Dec2016 to (Evaluate:22Jan2017)  Requested for: 67ERF3351; Last     Rx:23Dec2016 Ordered  7  Spironolactone 100 MG Oral Tablet; TAKE 1 TABLET DAILY; Therapy: 23Dec2016 to (Evaluate:22Jan2017)  Requested for: 83BAH8362; Last     Rx:23Dec2016 Ordered  8  Cyclobenzaprine HCl - 10 MG Oral Tablet; TAKE 1 TABLET 3 TIMES DAILY AS NEEDED; Therapy: 61Vkr4665 to (Evaluate:07Jan2017)  Requested for: 01PYF3989; Last     Rx:23Dec2016 Ordered   9   Ibuprofen 600 MG Oral Tablet; TAKE 1 TABLET 4 TIMES DAILY  WITH MEALS AS     NEEDED; Therapy: 78Mpn8487 to (Evaluate:07Jan2017)  Requested for: 11Myg5691; Last     Rx:63Xpb2194 Ordered  10  Gabapentin 300 MG Oral Capsule; TAKE ONE CAPSULE BY MOUTH TWICE DAILY; Therapy: 90Efz5320 to (Evaluate:22Apr2017)  Requested for: 60Bwp6978; Last      Rx:83Ltn2833 Ordered  11  Acetaminophen-Codeine #3 300-30 MG Oral Tablet; Therapy: 87GAX4578 to (Last Rx:08Jan2011)  Requested for: 04BUT1305 Ordered   12  Amoxicillin-Pot Clavulanate 875-125 MG Oral Tablet; Therapy: 74VYX1538 to (Last Rx:75Oje7973)  Requested for: 01Yzl8392 Ordered   13  Bisoprolol-Hydrochlorothiazide 10-6 25 MG Oral Tablet; Therapy: 02OAR9516 to (Last HZ:11ANC4973)  Requested for: 86KEM7413 Ordered   14  Ciprofloxacin HCl - 500 MG Oral Tablet; Therapy: 73BQK2499 to (Last [facility])  Requested for: 69EXZ8448 Ordered   15  Doxycycline Hyclate 100 MG Oral Capsule; Therapy: 38NTO7536 to (Last Rx:86Gye4517)  Requested for: 10TGN8341 Ordered   16  Doxycycline Hyclate 100 MG Oral Tablet; Therapy: 00ERJ4013 to (Last Rx:01Nov2011)  Requested for: 24SNE9477 Ordered   17  Furosemide 40 MG Oral Tablet; Therapy: 23VPY2731 to (Last YL:78USC6498)  Requested for: 88XOR6208 Ordered   18  Hydrocodone-Acetaminophen 5-325 MG Oral Tablet; Therapy: 53PWR0378 to (Last Rx:03Oct2011)  Requested for: 33VFP4534 Ordered   19  Hydrocodone-Acetaminophen 5-500 MG Oral Tablet; Therapy: 14ZUS3019 to (Last [facility])  Requested for: 66Omd0026 Ordered   20  Klor-Con M20 20 MEQ Oral Tablet Extended Release; Therapy: 71KUR0413 to (Last Rx:03Oct2011)  Requested for: 78EAV5956 Ordered   21  Mupirocin 2 % External Ointment; Therapy: 28ZEB0588 to (Last [place])  Requested for: 29Mar2011 Ordered   22  Sertraline HCl - 100 MG Oral Tablet; Therapy: 32LMZ5044 to (Last QK:71WEV1124)  Requested for: 31OOP8258 Ordered   23  Sulfamethoxazole-TMP -160 MG TABS;       Therapy: 57ACS1179 to (Last Rx:03Oct2011)  Requested for: 68NKA4714 Ordered   24  TraZODone HCl - 50 MG Oral Tablet;       Therapy: 62DTB9281 to (Last TD:03KNR7892)  Requested for: 25ASV4068 Ordered    Signatures    Electronically signed by : FRANCK Orona ; Jan 9 2018  3:27PM EST                       (Author)

## 2018-01-16 NOTE — PROCEDURES
Procedures by Meir Reagan PA-C at 11/18/2016   3:38 PM      Author:  Meir Reagan PA-C Service:  Orthopedics Author Type:  Physician Assistant    Filed:  11/18/2016  3:46 PM Date of Service:  11/18/2016  3:38 PM Status:  Attested    :  Meir Reagan PA-C (Physician Assistant)  Cosigner:  Yosvany Villeda MD at 11/21/2016  9:04 AM    Attestation signed by Yosvany Villeda MD at 11/21/2016  9:04 AM           Split/Shared Statement  I reviewed the case with the Advanced Practitioner, and I agree with the AP note  Short Procedure Note - Bilateral Knee Cortisone Injections  Verbal Consent Obtained  Skin Sterilely prepped with alcohol pads  Ethyl Chloride Spray utilized to achieve adequate anesthesia  18 gauge spinal needle inserted into the anterolateral portal of the right and left knees respectively  5 0 ml 1% Lidocaine plain and 1 0 ml 6mg/ml Betamethasone injected into the knee joints  Pt Tolerated Procedure well  Band-aid applied  No complications  Pt did not immediate relief after the injections        Meir Reagan PA-C           Received for:Bruno Jarrell [place] [facility]  Nov 21 2016  9:04AM [facility] Standard Time

## 2018-01-18 NOTE — PROCEDURES
Procedures by Remy Means RN  at 2/13/2017 11:25 AM      Author:  eRmy Means RN  Service:  Interventional Radiology   Author Type:  Registered Nurse     Filed:  2/13/2017 11:53 AM  Date of Service:  2/13/2017 11:25 AM  Status:  Attested Addendum     :  Remy Means RN (Registered Nurse)         Related Notes: Original Note by Remy Means RN (Registered Nurse) filed at 2/13/2017 11:50 AM        Cosigner:  Kyung Rodarte MD at 2/13/2017  2:18 PM           Procedure Orders:       1  Insert PICC line [[id number]] ordered by Avinash Wren RN at 02/13/17 1125                 Post-procedure Diagnoses:       1  Atrial fibrillation with RVR [I48 91]              Attestation signed by Kyung Rodarte MD at 2/13/2017  2:18 PM           CVC appears to be in an aberrant vessel  Will assess with CT chest prior to use  Discussed with radiology, who recommended a CT chest     Otherwise straightforward placement  VBG evidence of venous location, and CVP 10 mmHg  PICC Line Insertion  Performed  by: Cesar Engle by: Estrellita Ricks     Procedure date/time:  2/13/2017 11:25 AM  Patient location:  Bedside  Other Assisting Provider:  No    Consent:     Consent obtained:  Written    Consent given by:  Patient  Universal protocol:     Procedure explained and questions answered to patient or proxy's satisfaction: yes      Relevant documents present and verified: yes      Test results available and properly labeled: yes       Required blood products, implants, devices, and special equipment available: yes      Site/side marked: yes      Immediately prior to procedure, a time out was called: yes      Patient identity confirmed:  Verbally with patient, hospital-assigned identification number and arm band  Pre-procedure details:     Hand hygiene: Hand hygiene performed prior to insertion      Sterile barrier technique:  All elements of maximal sterile technique followed      Skin preparation:  ChloraPrep    Skin preparation agent: Skin preparation agent completely dried prior to procedure    Indications:     PICC line indications: vascular access    Anesthesia (see MAR for exact dosages): Anesthesia method:  Local infiltration    Local anesthetic:  Lidocaine 1% w/o epi  Procedure details:     Location:  Basilic    Vessel type: vein      Laterality:  Right    Approach: percutaneous technique used      Patient position:  Flat    Procedural supplies:  Double lumen    Catheter size:  5 Fr    Landmarks identified: yes      Ultrasound guidance: yes      Sterile ultrasound techniques: Sterile gel and sterile probe covers were used      Number of attempts:  1    Successful placement: yes      Vessel of catheter tip end:  Svc    Total catheter length (cm):  55    Catheter out on skin (cm):  0    Max flow rate:  5 ml/sec    Arm circumference:  44  Post-procedure details:     Post-procedure:  Dressing applied    Assessment:  Blood return through all ports    Post-procedure complications: none      Patient tolerance of procedure:   Tolerated well, no immediate complications    Observer: Yes      Observer name:  Neil Elfederico                         Received for:Provider  EPIC   Feb 13 2017  2:18PM [facility] Standard Time

## 2018-01-23 VITALS
SYSTOLIC BLOOD PRESSURE: 142 MMHG | DIASTOLIC BLOOD PRESSURE: 78 MMHG | BODY MASS INDEX: 47.74 KG/M2 | HEIGHT: 68 IN | OXYGEN SATURATION: 98 % | HEART RATE: 102 BPM | WEIGHT: 315 LBS

## 2018-07-20 ENCOUNTER — OFFICE VISIT (OUTPATIENT)
Dept: CARDIOLOGY CLINIC | Facility: CLINIC | Age: 48
End: 2018-07-20
Payer: COMMERCIAL

## 2018-07-20 VITALS
HEIGHT: 70 IN | SYSTOLIC BLOOD PRESSURE: 124 MMHG | OXYGEN SATURATION: 99 % | DIASTOLIC BLOOD PRESSURE: 76 MMHG | HEART RATE: 100 BPM

## 2018-07-20 DIAGNOSIS — E66.2 OBESITY HYPOVENTILATION SYNDROME (HCC): ICD-10-CM

## 2018-07-20 DIAGNOSIS — E66.01 MORBID OBESITY (HCC): ICD-10-CM

## 2018-07-20 DIAGNOSIS — I48.19 PERSISTENT ATRIAL FIBRILLATION (HCC): ICD-10-CM

## 2018-07-20 DIAGNOSIS — Z01.810 PREOPERATIVE CARDIOVASCULAR EXAMINATION: Primary | ICD-10-CM

## 2018-07-20 PROCEDURE — 99204 OFFICE O/P NEW MOD 45 MIN: CPT | Performed by: INTERNAL MEDICINE

## 2018-07-20 PROCEDURE — 93000 ELECTROCARDIOGRAM COMPLETE: CPT | Performed by: INTERNAL MEDICINE

## 2018-07-20 RX ORDER — LEVOTHYROXINE SODIUM 0.03 MG/1
25 TABLET ORAL DAILY
COMMUNITY

## 2018-07-20 RX ORDER — SODIUM PHOSPHATE, DIBASIC AND SODIUM PHOSPHATE, MONOBASIC 7; 19 G/133ML; G/133ML
1 ENEMA RECTAL ONCE AS NEEDED
COMMUNITY
End: 2019-04-18 | Stop reason: CLARIF

## 2018-07-20 RX ORDER — RANITIDINE 150 MG/1
150 TABLET ORAL 2 TIMES DAILY
COMMUNITY
End: 2021-05-16 | Stop reason: HOSPADM

## 2018-07-20 RX ORDER — LANOLIN ALCOHOL/MO/W.PET/CERES
1000 CREAM (GRAM) TOPICAL DAILY
COMMUNITY
End: 2019-04-18 | Stop reason: CLARIF

## 2018-07-20 RX ORDER — IPRATROPIUM BROMIDE AND ALBUTEROL SULFATE 2.5; .5 MG/3ML; MG/3ML
3 SOLUTION RESPIRATORY (INHALATION) 3 TIMES DAILY
COMMUNITY

## 2018-07-20 RX ORDER — ALBUTEROL SULFATE 2.5 MG/3ML
2.5 SOLUTION RESPIRATORY (INHALATION) EVERY 6 HOURS PRN
Status: ON HOLD | COMMUNITY
End: 2022-06-06

## 2018-07-20 RX ORDER — TRAZODONE HYDROCHLORIDE 100 MG/1
50 TABLET ORAL
COMMUNITY
End: 2021-07-08 | Stop reason: HOSPADM

## 2018-07-20 RX ORDER — HYDROXYZINE PAMOATE 25 MG/1
25 CAPSULE ORAL 4 TIMES DAILY PRN
COMMUNITY
End: 2021-05-16 | Stop reason: HOSPADM

## 2018-07-20 RX ORDER — UREA 10 %
1 LOTION (ML) TOPICAL DAILY
COMMUNITY
End: 2019-04-18 | Stop reason: CLARIF

## 2018-07-20 RX ORDER — ACETAMINOPHEN 325 MG/1
650 TABLET ORAL EVERY 6 HOURS PRN
COMMUNITY
End: 2021-05-16 | Stop reason: HOSPADM

## 2018-07-20 RX ORDER — ACETAMINOPHEN AND CODEINE PHOSPHATE 300; 30 MG/1; MG/1
1 TABLET ORAL EVERY 6 HOURS PRN
COMMUNITY
Start: 2011-01-08 | End: 2021-07-08 | Stop reason: HOSPADM

## 2018-07-20 RX ORDER — D-METHORPHAN/PE/ACETAMINOPHEN 10-5-325MG
CAPSULE ORAL DAILY
COMMUNITY
End: 2021-05-16 | Stop reason: HOSPADM

## 2018-07-20 NOTE — LETTER
Cardiology Pre Operative Clearance      PRE OPERATIVE CARDIAC RISK ASSESSMENT    07/20/18    Yamel Rod  1970  [id number]    Date of Surgery: 08/07/2018    Type of Surgery: Bariatric surgery- laparoscopic sleeve      Surgeon: Yari Marks MD    No Cardiac Contraindication for Planned Surgical Procedures    Anticoagulation: NO    Physician Comment:  Continue beta-blocker perioperatively    Electronically Signed: Randall Yoon MD

## 2018-07-20 NOTE — PROGRESS NOTES
Cardiology Consultation     An Espinoza  [id number]  1970  [address] [facility] [place] [address] [address] [id number]    1  Preoperative cardiovascular examination  POCT ECG   2  Morbid obesity ([address] )     3  Persistent atrial fibrillation [facility])       HPI:    Mr Christofer Abdi comes in again for a preoperative cardiovascular risk assessment for upcoming bariatric surgery  I saw him in consultation about 6 months ago for this same reason, but his surgery had been put on hold  Karthik Saeed has a long history of morbid obesity, with weight is up to 600 lb, and he is currently going through a bariatric surgery program out of [place]  His cardiac history includes chronic/persistent atrial fibrillation that has been rate controlled  He also has chronic lower extremity edema, prior DVTs and COPD  I suspect that some of his chronic lung disease is extrinsic from his morbid obesity  He also has obesity hypoventilation syndrome, and is on BiPAP at night  He is on during the day home oxygen and resides at a local nursing facility  We did see him in consultation during a hospitalization last year for generalized weakness and tremors  We were consulted secondary to his atrial fibrillation  Echocardiograms performed have been for the most part uninterpretable  Karthik Saeed says that he will occasionally get palpitations or feelings of tachycardia  However, this is few and far between  He has chronic shortness of breath  He is essentially bed-bound  Denies chest pain or any symptoms of angina  He has chronic volume overload and lymphedema         Patient Active Problem List   Diagnosis    Morbid obesity ([address] )    Diabetes mellitus ([address] )    Acute on chronic respiratory failure with hypoxia and hypercapnia (HCC)    MRSA (methicillin resistant Staphylococcus aureus) Tracheobronchitis    Obesity hypoventilation syndrome (HCC)    Chronic diastolic congestive heart failure (HCC)    [facility]pulmonary artery aneurysm) ([facility])    Recurrent bronchospasm    Venous stasis dermatitis of both lower extremities    Ingrown toenail without infection    Foot pain    Knee pain    DVT (deep venous thrombosis) ([facility])    Medical non-compliance    Persistent atrial fibrillation ([facility])     Past Medical History:   Diagnosis Date    Afib ([place] [address] )     Anemia     Cardiac disease     Cellulitis     COPD (chronic obstructive pulmonary disease) ([place] [address] )     Depression     Diabetes mellitus ([place] [address] )     DVT (deep venous thrombosis) ([facility])     GERD (gastroesophageal reflux disease)     Heart failure ([facility])     Hypertension     Hypokalemia     Obesity     Psychiatric disorder      Social History     Social History    Marital status: Single     Spouse name: N/A    Number of children: N/A    Years of education: N/A     Occupational History    Currently at [address]      Social History Main Topics    Smoking status: Former Smoker    Smokeless tobacco: Never Used      Comment: 1 5ppd x 23 years, quit 2014    Alcohol use No    Drug use: No    Sexual activity: Not on file     Other Topics Concern    Not on file     Social History Narrative    No narrative on file      Family History   Problem Relation Age of Onset    Lung cancer Father     Diabetes Maternal Aunt     Heart attack Mother     Clotting disorder Mother     Hypertension Mother     Heart failure Mother     Asthma Sister     Stroke Neg Hx     Anuerysm Neg Hx     Arrhythmia Neg Hx     Hyperlipidemia Neg Hx         pt unsure    Fainting Neg Hx      Past Surgical History:   Procedure Laterality Date    CHOLECYSTECTOMY      KNEE SURGERY Right     open wound, injury        Current Outpatient Prescriptions:     acetaminophen (TYLENOL) 325 mg tablet, Take 650 mg by mouth every 6 (six) hours as needed for mild pain, Disp: , Rfl:     acetaminophen-codeine (TYLENOL #3) 300-30 mg per tablet, Take 1 tablet by mouth every 6 (six) hours as needed, Disp: , Rfl:     albuterol (2 5 mg/3 mL) 0 083 % nebulizer solution, Take 2 5 mg by nebulization every 6 (six) hours as needed for wheezing or shortness of breath, Disp: , Rfl:     bisacodyl (FLEET) 10 MG/30ML ENEM, Insert 10 mg into the rectum daily as needed for constipation, Disp: , Rfl:     buPROPion (WELLBUTRIN) 100 mg tablet, Take 1 tablet by mouth daily for 30 days (Patient taking differently: Take 150 mg by mouth 2 (two) times a day  ), Disp: 30 tablet, Rfl: 0    calcium carbonate-vitamin D (OSCAL-D) 500 mg-200 units per tablet, Take 1 tablet by mouth daily with breakfast for 30 days, Disp: 30 tablet, Rfl: 0    cyanocobalamin (VITAMIN B-12) 1,000 mcg tablet, Take 1,000 mcg by mouth daily, Disp: , Rfl:     digoxin (LANOXIN) 0 125 mg tablet, Take 1 tablet by mouth daily for 30 days, Disp: 30 tablet, Rfl: 0    furosemide (LASIX) 80 mg tablet, Take 1 tablet by mouth 2 (two) times a day for 30 days, Disp: 60 tablet, Rfl: 0    gabapentin (NEURONTIN) 300 mg capsule, Take 1 capsule by mouth 2 (two) times a day as needed (pain) for up to 30 days, Disp: 60 capsule, Rfl: 0    hydroxypropyl methylcellulose (GONAK) 2 5 % ophthalmic solution, Administer 1 drop to both eyes 3 (three) times a day, Disp: , Rfl:     hydrOXYzine pamoate (VISTARIL) 25 mg capsule, Take 25 mg by mouth 4 (four) times a day as needed for itching, Disp: , Rfl:     insulin glargine (LANTUS) 100 units/mL subcutaneous injection, Inject 80 Units under the skin daily at bedtime for 30 days (Patient taking differently: Inject 30 Units under the skin daily at bedtime  ), Disp: 2400 Units, Rfl: 0    ipratropium-albuterol (DUO-NEB) 0 5-2 5 mg/3 mL nebulizer solution, Take 3 mL by nebulization 4 (four) times a day, Disp: , Rfl:     Lactobacillus TABS, Take 1 tablet by mouth daily, Disp: , Rfl:     levothyroxine 25 mcg tablet, Take 25 mcg by mouth daily, Disp: , Rfl:     loperamide (IMODIUM) 1 mg/5 mL oral liquid, Take 2 mg by mouth once as needed for diarrhea, Disp: , Rfl:     magnesium gluconate (MAGONATE) 500 mg tablet, Take 0 5 tablets by mouth 2 (two) times a day before meals for 30 days, Disp: 30 tablet, Rfl: 0    magnesium hydroxide (MILK OF MAGNESIA) 400 mg/5 mL oral suspension, Take 30 mL by mouth every 12 (twelve) hours as needed for constipation for up to 30 days, Disp: 360 mL, Rfl: 0    menthol-methyl salicylate (BENGAY) 14-22 % cream, Apply 1 application topically 4 (four) times a day as needed (pain) for up to 30 days, Disp: 85 g, Rfl: 0    metoprolol tartrate (LOPRESSOR) 25 mg tablet, Take 1 tablet by mouth every 12 (twelve) hours for 30 days (Patient taking differently: Take 50 mg by mouth every 12 (twelve) hours  ), Disp: 60 tablet, Rfl: 0    ondansetron (ZOFRAN) 8 mg tablet, Take 1 tablet by mouth every 8 (eight) hours as needed for nausea or vomiting for up to 30 days (Patient taking differently: Take 8 mg by mouth every 6 (six) hours as needed for nausea or vomiting  ), Disp: 20 tablet, Rfl: 0    Pediatric Multiple Vit-C-FA (FLINSTONES GUMMIES OMEGA-3 DHA) CHEW, Chew daily 60 mg, Disp: , Rfl:     polyethylene glycol (MIRALAX) 17 g packet, Take 17 g by mouth daily for 30 days, Disp: 510 g, Rfl: 0    potassium chloride (MICRO-K) 8 mEq CR capsule, Take 4 capsules by mouth 2 (two) times a day for 30 days (Patient taking differently: Take 30 mEq by mouth 3 (three) times a day  ), Disp: 240 capsule, Rfl: 0    primidone (MYSOLINE) 50 mg tablet, Take 1 tablet by mouth daily at bedtime for 30 days, Disp: 30 tablet, Rfl: 0    ranitidine (ZANTAC) 150 mg tablet, Take 150 mg by mouth 2 (two) times a day, Disp: , Rfl:     senna (SENOKOT) 8 6 MG tablet, Take 1 tablet by mouth 2 (two) times a day for 30 days, Disp: 60 tablet, Rfl: 0    sodium phosphate-biphosphate (FLEET) 7-19 g 118 mL enema, Insert 1 enema into the rectum once as needed, Disp: , Rfl:     traZODone (DESYREL) 100 mg tablet, Take 50 mg by mouth daily at bedtime, Disp: , Rfl:     ibuprofen (MOTRIN) 600 mg tablet, Take 1 tablet by mouth every 6 (six) hours as needed for mild pain for up to 30 days, Disp: 30 tablet, Rfl: 0  Allergies   Allergen Reactions    Penicillins Hives     Vitals:    07/20/18 0933   BP: 124/76   BP Location: Left arm   Patient Position: Sitting   Cuff Size: Extra-Large   Pulse: 100   SpO2: 99%   Height: 5' 10" (1 778 m)       Labs:  Lab Results   Component Value Date     02/16/2017    K 3 9 02/16/2017    CL 98 (L) 02/16/2017    CO2 37 (H) 02/16/2017    BUN 13 02/16/2017    CREATININE 0 96 02/16/2017    GLUCOSE 98 02/16/2017    GLUCOSE 110 08/25/2016    CALCIUM 9 1 02/16/2017     Lab Results   Component Value Date    CHOL 163 08/16/2016    TRIG 197 (H) 08/16/2016    HDL 44 08/16/2016     Imaging:  ECG today obtained shows atrial fibrillation, low voltage with nonspecific ST and T-wave changes  Review of Systems:  Review of Systems   Constitutional: Positive for fatigue  HENT: Negative  Eyes: Negative  Respiratory: Positive for shortness of breath  Cardiovascular: Positive for palpitations and leg swelling  Gastrointestinal: Negative  Musculoskeletal: Positive for gait problem  Skin: Negative  Allergic/Immunologic: Negative  Hematological: Negative  Psychiatric/Behavioral: Negative  All other systems reviewed and are negative  Physical Exam:  Physical Exam   Constitutional: He is oriented to person, place, and time  Vital signs are normal  He appears well-developed and well-nourished  Nasal cannula in place  HENT:   Head: Normocephalic and atraumatic  Eyes: EOM are normal  Pupils are equal, round, and reactive to light  Right eye exhibits no discharge  Left eye exhibits no discharge  No scleral icterus  Neck: Normal range of motion  Neck supple  No JVD present  No thyromegaly present  Cardiovascular: Normal rate, S1 normal, S2 normal, intact distal pulses and normal pulses  An irregularly irregular rhythm present  Exam reveals distant heart sounds  Exam reveals no gallop and no friction rub  No murmur heard  Pulmonary/Chest: Effort normal  No respiratory distress  He has decreased breath sounds  He has no wheezes  He has no rales  Abdominal: Soft  Bowel sounds are normal  He exhibits no distension  There is no tenderness  Musculoskeletal: Normal range of motion  He exhibits edema  He exhibits no tenderness or deformity  Neurological: He is alert and oriented to person, place, and time  No cranial nerve deficit  Skin: Skin is warm and dry  No rash noted  Psychiatric: He has a normal mood and affect  Judgment and thought content normal    Nursing note and vitals reviewed  Discussion/Summary:    1  Preoperative risk assessment - His overall risk appears to be at least in the intermediate range  But this is including both cardiovascular and pulmonary, along with his other multiple comorbidities  From a cardiovascular standpoint he is at lower risk, and could proceed  Certainly, he is in need of this bariatric surgery and we suggest proceeding without further testing  Continue rate controlling atrial fibrillation medications perioperatively, and it is okay from our standpoint for him to hold Pradaxa preoperatively       2  Chronic/persistent atrial fibrillation - His rates are under adequate control  No changes were made to his medical therapy  We will bring him back in 6 months  If his weight loss surgery is successful, once he has lost weight we will consider updated testing  Echocardiograms in the past have been uninterpretable  Anticoagulation is recommended given his atrial fibrillation and history of DVTs      3  Obesity hypoventilation syndrome and right-sided CHF/edema - Management of this per Pulmonary as he is on BiPAP currently at night  Continue the same dose of Lasix   Most of his medications will have to be adjusted if he does significantly dropped weight postoperatively  We will see him back in 6 months  Counseling / Coordination of Care  Total floor / unit time spent today 40 minutes  Greater than 50% of total time was spent with the patient and / or family counseling and / or coordination of care

## 2018-07-25 ENCOUNTER — TELEPHONE (OUTPATIENT)
Dept: CARDIOLOGY CLINIC | Facility: CLINIC | Age: 48
End: 2018-07-25

## 2019-04-18 ENCOUNTER — OFFICE VISIT (OUTPATIENT)
Dept: CARDIOLOGY CLINIC | Facility: CLINIC | Age: 49
End: 2019-04-18
Payer: COMMERCIAL

## 2019-04-18 VITALS
OXYGEN SATURATION: 98 % | BODY MASS INDEX: 44.1 KG/M2 | WEIGHT: 315 LBS | HEIGHT: 71 IN | SYSTOLIC BLOOD PRESSURE: 112 MMHG | HEART RATE: 71 BPM | DIASTOLIC BLOOD PRESSURE: 70 MMHG

## 2019-04-18 DIAGNOSIS — I50.32 CHRONIC DIASTOLIC CONGESTIVE HEART FAILURE (HCC): ICD-10-CM

## 2019-04-18 DIAGNOSIS — E66.01 MORBID OBESITY (HCC): ICD-10-CM

## 2019-04-18 DIAGNOSIS — Z01.810 PREOPERATIVE CARDIOVASCULAR EXAMINATION: Primary | ICD-10-CM

## 2019-04-18 DIAGNOSIS — I48.19 PERSISTENT ATRIAL FIBRILLATION (HCC): ICD-10-CM

## 2019-04-18 PROCEDURE — 99215 OFFICE O/P EST HI 40 MIN: CPT | Performed by: INTERNAL MEDICINE

## 2019-04-18 PROCEDURE — 93000 ELECTROCARDIOGRAM COMPLETE: CPT | Performed by: INTERNAL MEDICINE

## 2019-04-18 RX ORDER — ONDANSETRON 4 MG/1
8 TABLET, FILM COATED ORAL EVERY 8 HOURS PRN
COMMUNITY
End: 2021-05-16 | Stop reason: HOSPADM

## 2019-04-18 RX ORDER — CYCLOBENZAPRINE HCL 10 MG
10 TABLET ORAL 3 TIMES DAILY PRN
COMMUNITY
End: 2021-07-08 | Stop reason: HOSPADM

## 2019-04-18 RX ORDER — DABIGATRAN ETEXILATE 150 MG/1
150 CAPSULE, COATED PELLETS ORAL 2 TIMES DAILY
COMMUNITY

## 2019-12-06 ENCOUNTER — OFFICE VISIT (OUTPATIENT)
Dept: CARDIOLOGY CLINIC | Facility: CLINIC | Age: 49
End: 2019-12-06
Payer: COMMERCIAL

## 2019-12-06 VITALS
HEIGHT: 70 IN | DIASTOLIC BLOOD PRESSURE: 60 MMHG | BODY MASS INDEX: 59.83 KG/M2 | SYSTOLIC BLOOD PRESSURE: 98 MMHG | HEART RATE: 78 BPM | OXYGEN SATURATION: 94 %

## 2019-12-06 DIAGNOSIS — I48.19 PERSISTENT ATRIAL FIBRILLATION (HCC): Primary | ICD-10-CM

## 2019-12-06 DIAGNOSIS — E66.2 OBESITY HYPOVENTILATION SYNDROME (HCC): ICD-10-CM

## 2019-12-06 DIAGNOSIS — I50.32 CHRONIC DIASTOLIC CONGESTIVE HEART FAILURE (HCC): ICD-10-CM

## 2019-12-06 DIAGNOSIS — E66.01 MORBID OBESITY (HCC): ICD-10-CM

## 2019-12-06 PROCEDURE — 93000 ELECTROCARDIOGRAM COMPLETE: CPT | Performed by: INTERNAL MEDICINE

## 2019-12-06 PROCEDURE — 99214 OFFICE O/P EST MOD 30 MIN: CPT | Performed by: INTERNAL MEDICINE

## 2019-12-06 RX ORDER — SPIRONOLACTONE 50 MG/1
50 TABLET, FILM COATED ORAL DAILY
COMMUNITY

## 2019-12-06 RX ORDER — CHOLECALCIFEROL (VITAMIN D3) 50 MCG
2000 TABLET ORAL DAILY
COMMUNITY

## 2019-12-06 RX ORDER — PANTOPRAZOLE SODIUM 20 MG/1
20 TABLET, DELAYED RELEASE ORAL DAILY
Status: ON HOLD | COMMUNITY
End: 2022-03-18 | Stop reason: SDUPTHER

## 2019-12-06 RX ORDER — MULTIVITAMIN/IRON/FOLIC ACID 18MG-0.4MG
TABLET ORAL
COMMUNITY
End: 2021-05-16 | Stop reason: HOSPADM

## 2019-12-06 RX ORDER — DIPHENOXYLATE HYDROCHLORIDE AND ATROPINE SULFATE 2.5; .025 MG/1; MG/1
1 TABLET ORAL DAILY
COMMUNITY
End: 2021-07-08 | Stop reason: HOSPADM

## 2019-12-06 RX ORDER — POTASSIUM CHLORIDE 1.5 G/1.77G
20 POWDER, FOR SOLUTION ORAL 2 TIMES DAILY
COMMUNITY
End: 2021-07-08 | Stop reason: HOSPADM

## 2019-12-06 NOTE — PROGRESS NOTES
Cardiology Consultation     Shadia Polanco  [id number]  1970  [facility] [address] [id number]    1  Persistent atrial fibrillation  POCT ECG   2  Chronic diastolic congestive heart failure ([address] )     3  Obesity hypoventilation syndrome ([address] )     4  Morbid obesity (HCC)       HPI:    Mr Tiera Sandhu comes in follow-up given his persistent atrial fibrillation  I have also seen him for preoperative cardiovascular risk assessments for bariatric surgery  Shannan Oconnor has a long history of morbid obesity, with weight is up to 600 lb  Since I last saw him he ended up going through bariatric surgery and has lost over 200 lb  His cardiac history includes chronic/persistent atrial fibrillation that has been rate controlled  He also has chronic diastolic and right-sided congestive heart failure, with lower extremity edema, prior DVTs and COPD  I suspect that most of his chronic lung disease is extrinsic from his morbid obesity  He also has obesity hypoventilation syndrome  He is on BiPAP at night, and was on chronic oxygen, coming off of this after losing weight  Echocardiograms performed have been for the most part uninterpretable  Since I last saw him he also went through a hernia surgery which went well without complications  Shannan Oconnor says he feels better with losing weight  He is not having any change in symptoms from a cardiac perspective  He does have chronic lower extremity edema which is improved  He has not had any symptoms of his atrial fibrillation  He denies palpitations  He does get some vertiginous type dizziness, as the room spins when he changes positions at times  He denies lightheadedness or any syncope  He has chronic shortness of breath but this has improved  As he is losing weight, his stamina is improving and he is ambulating more more  He was once bed-bound    He now ambulates with physical therapy  He also does the recumbent bike        Patient Active Problem List   Diagnosis    Morbid obesity ([place] [address] )    Diabetes mellitus ([place] [address] )    Acute on chronic respiratory failure with hypoxia and hypercapnia ([facility])    MRSA (methicillin resistant Staphylococcus aureus) Tracheobronchitis    Obesity hypoventilation syndrome ([facility])    Chronic diastolic congestive heart failure ([facility])    PA (pulmonary artery aneurysm) ([facility])    Recurrent bronchospasm    Venous stasis dermatitis of both lower extremities    Ingrown toenail without infection    Foot pain    Knee pain    DVT (deep venous thrombosis) ([facility])    Medical non-compliance    Persistent atrial fibrillation     Past Medical History:   Diagnosis Date    Afib ([place] [address] )     Anemia     Cardiac disease     Cellulitis     COPD (chronic obstructive pulmonary disease) ([place] [address] )     Depression     Diabetes mellitus ([place] [address] )     DVT (deep venous thrombosis) ([facility])     GERD (gastroesophageal reflux disease)     Heart failure ([place] [address] )     Hypertension     Hypokalemia     Obesity     Psychiatric disorder      Social History     Socioeconomic History    Marital status: Single     Spouse name: Not on file    Number of children: Not on file    Years of education: Not on file    Highest education level: Not on file   Occupational History    Occupation: Currently at [employer] resource strain: Not on file    Food insecurity:     Worry: Not on file     Inability: Not on file    Transportation needs:     Medical: Not on file     Non-medical: Not on file   Tobacco Use    Smoking status: Former Smoker    Smokeless tobacco: Never Used    Tobacco comment: 1 5ppd x 23 years, quit 2014   Substance and Sexual Activity    Alcohol use: No    Drug use: No    Sexual activity: Not on file   Lifestyle    Physical activity:     Days per week: Not on file     Minutes per session: Not on file    Stress: Not on file   Relationships  Social connections:     Talks on phone: Not on file     Gets together: Not on file     Attends Sikh service: Not on file     Active member of club or organization: Not on file     Attends meetings of clubs or organizations: Not on file     Relationship status: Not on file    Intimate partner violence:     Fear of current or ex partner: Not on file     Emotionally abused: Not on file     Physically abused: Not on file     Forced sexual activity: Not on file   Other Topics Concern    Not on file   Social History Narrative    Not on file      Family History   Problem Relation Age of Onset    Lung cancer Father     Diabetes Maternal Aunt     Heart attack Mother     Clotting disorder Mother     Hypertension Mother     Heart failure Mother     Asthma Sister     Stroke Neg Hx     Anuerysm Neg Hx     Arrhythmia Neg Hx     Hyperlipidemia Neg Hx         pt unsure    Fainting Neg Hx      Past Surgical History:   Procedure Laterality Date    ABDOMINAL HERNIA REPAIR  05/2019    CHOLECYSTECTOMY      KNEE SURGERY Right     open wound, injury        Current Outpatient Medications:     acetaminophen (TYLENOL) 325 mg tablet, Take 650 mg by mouth every 6 (six) hours as needed for mild pain, Disp: , Rfl:     buPROPion (WELLBUTRIN) 100 mg tablet, Take 1 tablet by mouth daily for 30 days (Patient taking differently: Take 150 mg by mouth daily ), Disp: 30 tablet, Rfl: 0    calcium carbonate-vitamin D (OSCAL-D) 500 mg-200 units per tablet, Take 1 tablet by mouth daily with breakfast for 30 days, Disp: 30 tablet, Rfl: 0    Cholecalciferol (VITAMIN D) 50 MCG (2000 UT) tablet, Take 2,000 Units by mouth daily, Disp: , Rfl:     cyanocobalamin (VITAMIN B-12) 1000 MCG tablet, Take 1,000 mcg by mouth daily, Disp: , Rfl:     cyclobenzaprine (FLEXERIL) 10 mg tablet, Take 10 mg by mouth 3 (three) times a day as needed for muscle spasms, Disp: , Rfl:     dabigatran etexilate (PRADAXA) 150 mg capsu, Take 150 mg by mouth 2 (two) times a day, Disp: , Rfl:     digoxin (LANOXIN) 0 125 mg tablet, Take 1 tablet by mouth daily for 30 days, Disp: 30 tablet, Rfl: 0    Ferrous Sulfate  (45 Fe) MG TBCR, Take by mouth, Disp: , Rfl:     furosemide (LASIX) 80 mg tablet, Take 1 tablet by mouth 2 (two) times a day for 30 days (Patient taking differently: Take 40 mg by mouth 2 (two) times a day ), Disp: 60 tablet, Rfl: 0    hydrOXYzine pamoate (VISTARIL) 25 mg capsule, Take 25 mg by mouth 4 (four) times a day as needed for itching , Disp: , Rfl:     Magnesium Oxide 250 MG TABS, Take by mouth, Disp: , Rfl:     metoprolol tartrate (LOPRESSOR) 25 mg tablet, Take 1 tablet by mouth every 12 (twelve) hours for 30 days (Patient taking differently: Take 50 mg by mouth every 12 (twelve) hours  ), Disp: 60 tablet, Rfl: 0    multivitamin (THERAGRAN) TABS, Take 1 tablet by mouth daily, Disp: , Rfl:     mupirocin (BACTROBAN) 2 % ointment, Apply topically 3 (three) times a day, Disp: , Rfl:     ondansetron (ZOFRAN) 4 mg tablet, Take 8 mg by mouth every 8 (eight) hours as needed for nausea or vomiting , Disp: , Rfl:     pantoprazole (PROTONIX) 20 mg tablet, Take 20 mg by mouth daily, Disp: , Rfl:     polyethylene glycol (MIRALAX) 17 g packet, Take 17 g by mouth daily for 30 days, Disp: 510 g, Rfl: 0    potassium chloride (KLOR-CON) 20 mEq packet, Take 20 mEq by mouth 2 (two) times a day, Disp: , Rfl:     senna (SENOKOT) 8 6 MG tablet, Take 1 tablet by mouth 2 (two) times a day for 30 days, Disp: 60 tablet, Rfl: 0    spironolactone (ALDACTONE) 50 mg tablet, Take 50 mg by mouth daily, Disp: , Rfl:     traZODone (DESYREL) 100 mg tablet, Take 50 mg by mouth daily at bedtime, Disp: , Rfl:     acetaminophen-codeine (TYLENOL #3) 300-30 mg per tablet, Take 1 tablet by mouth every 6 (six) hours as needed, Disp: , Rfl:     albuterol (2 5 mg/3 mL) 0 083 % nebulizer solution, Take 2 5 mg by nebulization every 6 (six) hours as needed for wheezing or shortness of breath, Disp: , Rfl:     gabapentin (NEURONTIN) 300 mg capsule, Take 1 capsule by mouth 2 (two) times a day as needed (pain) for up to 30 days, Disp: 60 capsule, Rfl: 0    ibuprofen (MOTRIN) 600 mg tablet, Take 1 tablet by mouth every 6 (six) hours as needed for mild pain for up to 30 days (Patient not taking: Reported on 12/6/2019), Disp: 30 tablet, Rfl: 0    insulin glargine (LANTUS) 100 units/mL subcutaneous injection, Inject 80 Units under the skin daily at bedtime for 30 days (Patient not taking: Reported on 12/6/2019), Disp: 2400 Units, Rfl: 0    ipratropium-albuterol (DUO-NEB) 0 5-2 5 mg/3 mL nebulizer solution, Take 3 mL by nebulization 4 (four) times a day, Disp: , Rfl:     levothyroxine 25 mcg tablet, Take 25 mcg by mouth daily, Disp: , Rfl:     loperamide (IMODIUM) 1 mg/5 mL oral liquid, Take 2 mg by mouth once as needed for diarrhea, Disp: , Rfl:     magnesium hydroxide (MILK OF MAGNESIA) 400 mg/5 mL oral suspension, Take 30 mL by mouth every 12 (twelve) hours as needed for constipation for up to 30 days (Patient not taking: Reported on 12/6/2019), Disp: 360 mL, Rfl: 0    menthol-methyl salicylate (BENGAY) 32-64 % cream, Apply 1 application topically 4 (four) times a day as needed (pain) for up to 30 days (Patient not taking: Reported on 12/6/2019), Disp: 85 g, Rfl: 0    Pediatric Multiple Vit-C-FA (FLINSTONES GUMMIES OMEGA-3 DHA) CHEW, Chew daily 60 mg, Disp: , Rfl:     primidone (MYSOLINE) 50 mg tablet, Take 1 tablet by mouth daily at bedtime for 30 days (Patient not taking: Reported on 12/6/2019), Disp: 30 tablet, Rfl: 0    ranitidine (ZANTAC) 150 mg tablet, Take 150 mg by mouth 2 (two) times a day, Disp: , Rfl:   Allergies   Allergen Reactions    Penicillins Hives     Vitals:    12/06/19 1034   BP: 98/60   BP Location: Left arm   Patient Position: Sitting   Cuff Size: Extra-Large   Pulse: 78   SpO2: 94%   Height: 5' 10" (1 778 m)       Labs:  Lab Results   Component Value Date    K 3 9 02/16/2017    CL 98 (L) 02/16/2017    CO2 37 (H) 02/16/2017    CO2 48 (HH) 08/25/2016    BUN 13 02/16/2017    CREATININE 0 96 02/16/2017    GLUCOSE 110 08/25/2016    CALCIUM 9 1 02/16/2017     Lab Results   Component Value Date    TRIG 197 (H) 08/16/2016    HDL 44 08/16/2016     Imaging:  ECG today obtained shows atrial fibrillation at 80 bpm, low voltage  Review of Systems:  Review of Systems   Constitutional: Positive for fatigue  HENT: Negative  Eyes: Negative  Respiratory: Positive for shortness of breath  Cardiovascular: Positive for leg swelling  Gastrointestinal: Negative  Musculoskeletal: Positive for arthralgias and gait problem  Skin: Negative  Allergic/Immunologic: Negative  Neurological: Positive for dizziness  Hematological: Negative  Psychiatric/Behavioral: Negative  All other systems reviewed and are negative  Vitals:    12/06/19 1034   BP: 98/60   Pulse: 78   SpO2: 94%     Physical Exam:  Physical Exam   Constitutional: He is oriented to person, place, and time  Vital signs are normal  He appears well-developed and well-nourished  HENT:   Head: Normocephalic and atraumatic  Eyes: Pupils are equal, round, and reactive to light  EOM are normal  Right eye exhibits no discharge  Left eye exhibits no discharge  No scleral icterus  Neck: Normal range of motion  Neck supple  No JVD present  No thyromegaly present  Cardiovascular: Normal rate, S1 normal, S2 normal, intact distal pulses and normal pulses  An irregularly irregular rhythm present  Exam reveals distant heart sounds  Exam reveals no gallop and no friction rub  No murmur heard  Pulmonary/Chest: Effort normal  No respiratory distress  He has decreased breath sounds  He has no wheezes  He has no rales  Abdominal: Soft  Bowel sounds are normal  He exhibits no distension  There is no tenderness  Musculoskeletal: Normal range of motion  He exhibits edema   He exhibits no tenderness or deformity  Neurological: He is alert and oriented to person, place, and time  No cranial nerve deficit  Skin: Skin is warm and dry  No rash noted  Psychiatric: He has a normal mood and affect  Judgment and thought content normal    Nursing note and vitals reviewed  Discussion/Summary:     1  Chronic/persistent atrial fibrillation - His rates are under good control  No changes were made to his medical therapy  We will bring him back in 6 months  His echocardiograms in the past have been unremarkable, but technically difficult  If he continues to lose weight we will get another echocardiogram after our next visit  Anticoagulation is recommended given his atrial fibrillation and history of DVTs      2  Obesity hypoventilation syndrome and right-sided CHF/edema - Management of this per Pulmonary as he is on BiPAP currently at night  This is improving now that he is losing weight  3  Chronic diastolic and right-sided CHF - This will also be easier to control as he loses weight  He should follow a low-sodium diet  He will continue the same dose of Lasix and blood work will be followed regularly  We will see him back in 6 months  Counseling / Coordination of Care  Total floor / unit time spent today 25minutes  Greater than 50% of total time was spent with the patient and / or family counseling and / or coordination of care

## 2019-12-06 NOTE — PATIENT INSTRUCTIONS
Low-Sodium Diet   AMBULATORY CARE:   A low-sodium diet  limits foods that are high in sodium (salt)  You will need to follow a low-sodium diet if you have high blood pressure, kidney disease, or heart failure  You may also need to follow this diet if you have a condition that is causing your body to retain (hold) extra fluid  You may need to limit the amount of sodium you eat to 1,500 mg  Ask your healthcare provider how much sodium you can have each day  How to use food labels to choose foods that are low in sodium:  Read food labels to find the amount of sodium they contain  The amount of sodium is listed in milligrams (mg)  The % Daily Value (DV) column tells you how much of your daily needs are met by 1 serving of the food for each nutrient listed  Choose foods that have less than 5% of the DV of sodium  These foods are considered low in sodium  Foods that have 20% or more of the DV of sodium are considered high in sodium  Some food labels may also list any of the following terms that tell you about the sodium content in the food:  · Sodium-free:  Less than 5 mg in each serving    · Very low sodium:  35 mg of sodium or less in each serving    · Low sodium:  140 mg of sodium or less in each serving    · Reduced sodium: At least 25% less sodium in each serving than the regular type    · Light in sodium:  50% less sodium in each serving    · Unsalted or no added salt:  No extra salt is added during processing (the food may still contain sodium)  Foods to avoid:  Salty foods are high in sodium   You should avoid the following:  · Processed foods:      ¨ Mixes for cornbread, biscuits, cake, and pudding     ¨ Instant foods, such as potatoes, cereals, noodles, and rice     ¨ Packaged foods, such as bread stuffing, rice and pasta mixes, snack dip mixes, and macaroni and cheese     ¨ Canned foods, such as canned vegetables, soups, broths, sauces, and vegetable or tomato juice    ¨ Snack foods, such as salted chips, popcorn, pretzels, pork rinds, salted crackers, and salted nuts    ¨ Frozen foods, such as dinners, entrees, vegetables with sauces, and breaded meats    ¨ Sauerkraut, pickled vegetables, and other foods prepared in brine    · Meats and cheeses:      ¨ Smoked or cured meat, such as corned beef, beltran, ham, hot dogs, and sausage    ¨ Canned meats or spreads, such as potted meats, sardines, anchovies, and imitation seafood    ¨ Deli or lunch meats, such as bologna, ham, turkey, and roast beef    ¨ Processed cheese, such as American cheese and cheese spreads    · Condiments, sauces, and seasonings:      ¨ Salt (¼ teaspoon of salt contains 575 mg of sodium)    ¨ Seasonings made with salt, such as garlic salt, celery salt, onion salt, and seasoned salt    ¨ Regular soy sauce, barbecue sauce, teriyaki sauce, steak sauce, Worcestershire sauce, and most flavored vinegars    ¨ Canned gravy and mixes     ¨ Regular condiments, such as mustard, ketchup, and salad dressings    ¨ Pickles and olives    ¨ Meat tenderizers and monosodium glutamate (MSG)  Foods to include:  Read the food label to find the exact amount of sodium in each serving  · Bread and cereal:  Try to choose breads with less than 80 mg of sodium per serving  ¨ Bread, roll, tristen, tortilla, or unsalted crackers  ¨ Ready-to-eat cereals with less than 5% DV of sodium (examples include shredded wheat and puffed rice)    ¨ Pasta    · Vegetables and fruits:      ¨ Unsalted fresh, frozen, or canned vegetables    ¨ Fresh, frozen, or canned fruits    ¨ Fruit juice    · Dairy:  One serving has about 150 mg of sodium  ¨ Milk, all types    ¨ Yogurt    ¨ Hard cheese, such as cheddar, Swiss, [employer], or mozzarella    · Meat and other protein foods:  Some raw meats may have added sodium       ¨ Plain meats, fish, and poultry     ¨ Eggs    · Other foods:      ¨ Homemade pudding    ¨ Unsalted nuts, popcorn, or pretzels    ¨ Unsalted butter or margarine  Ways to decrease sodium:   · Add spices and herbs to foods instead of salt during cooking  Use salt-free seasonings to add flavor to foods  Examples include onion powder, garlic powder, basil, villegas powder, paprika, and parsley  Try lemon or lime juice or vinegar to give foods a tart flavor  Use hot peppers, pepper, or cayenne pepper to add a spicy flavor to foods  · Do not keep a salt shaker at your kitchen table  This may help keep you from adding salt to food at the table  It may take time to get used to enjoying the natural flavor of food instead of adding salt  Talk to your healthcare provider before you use salt substitutes  Some salt substitutes have a high amount of potassium and need to be avoided if you have kidney disease  · Choose low-sodium foods at restaurants  Meals from restaurants are often high in sodium  Some restaurants have nutrition information on the menu that tells you the amount of sodium in their foods  If possible, ask for your food to be prepared with less, or no salt  · Shop for unsalted or low-sodium foods and snacks at the grocery store  Examples include unsalted or low-sodium broths, soups, and canned vegetables  Choose fresh or frozen vegetables instead  Choose unsalted nuts or seeds or fresh fruits or vegetables as snacks  Read food labels and choose salt-free, very low-sodium, or low-sodium foods  © 2017 [address] [employer] [employer] Information is for End User's use only and may not be sold, redistributed or otherwise used for commercial purposes  All illustrations and images included in CareNotes® are the copyrighted property of [employer]  or [employer]gu[employer]  The above information is an  only  It is not intended as medical advice for individual conditions or treatments  Talk to your doctor, nurse or pharmacist before following any medical regimen to see if it is safe and effective for you

## 2020-06-15 ENCOUNTER — TELEMEDICINE (OUTPATIENT)
Dept: CARDIOLOGY CLINIC | Facility: CLINIC | Age: 50
End: 2020-06-15
Payer: COMMERCIAL

## 2020-06-15 VITALS
DIASTOLIC BLOOD PRESSURE: 78 MMHG | WEIGHT: 315 LBS | BODY MASS INDEX: 45.1 KG/M2 | SYSTOLIC BLOOD PRESSURE: 122 MMHG | HEIGHT: 70 IN | HEART RATE: 88 BPM

## 2020-06-15 DIAGNOSIS — I48.19 PERSISTENT ATRIAL FIBRILLATION (HCC): ICD-10-CM

## 2020-06-15 DIAGNOSIS — I50.32 CHRONIC DIASTOLIC CONGESTIVE HEART FAILURE (HCC): Primary | ICD-10-CM

## 2020-06-15 DIAGNOSIS — E66.2 OBESITY HYPOVENTILATION SYNDROME (HCC): ICD-10-CM

## 2020-06-15 PROCEDURE — 99214 OFFICE O/P EST MOD 30 MIN: CPT | Performed by: INTERNAL MEDICINE

## 2021-04-12 ENCOUNTER — TELEPHONE (OUTPATIENT)
Dept: SURGERY | Facility: CLINIC | Age: 51
End: 2021-04-12

## 2021-04-12 DIAGNOSIS — Z98.84 BARIATRIC SURGERY STATUS: ICD-10-CM

## 2021-04-12 DIAGNOSIS — K91.2 POSTSURGICAL MALABSORPTION: ICD-10-CM

## 2021-04-12 DIAGNOSIS — E66.9 OBESITY: Primary | ICD-10-CM

## 2021-04-12 NOTE — TELEPHONE ENCOUNTER
Called and spoke to  about having patient come in for this 3rd annual bariatric surgery follow up  Patient has [employer] for you, and advised  we do not take that insurance and that they should call insurance company to see who the patient can follow up with  Also advised her that I will be putting in the blood work

## 2021-05-10 ENCOUNTER — APPOINTMENT (EMERGENCY)
Dept: RADIOLOGY | Facility: HOSPITAL | Age: 51
DRG: 194 | End: 2021-05-10
Payer: COMMERCIAL

## 2021-05-10 ENCOUNTER — APPOINTMENT (EMERGENCY)
Dept: CT IMAGING | Facility: HOSPITAL | Age: 51
DRG: 194 | End: 2021-05-10
Payer: COMMERCIAL

## 2021-05-10 ENCOUNTER — HOSPITAL ENCOUNTER (INPATIENT)
Facility: HOSPITAL | Age: 51
LOS: 5 days | Discharge: NURSING, REHAB OR LONG-TERM CARE | DRG: 194 | End: 2021-05-16
Attending: EMERGENCY MEDICINE | Admitting: INTERNAL MEDICINE
Payer: COMMERCIAL

## 2021-05-10 DIAGNOSIS — E66.01 MORBID OBESITY (HCC): ICD-10-CM

## 2021-05-10 DIAGNOSIS — I50.33 ACUTE ON CHRONIC DIASTOLIC CONGESTIVE HEART FAILURE (HCC): ICD-10-CM

## 2021-05-10 DIAGNOSIS — E66.9 OBESITY: ICD-10-CM

## 2021-05-10 DIAGNOSIS — R07.9 CHEST PAIN, UNSPECIFIED TYPE: ICD-10-CM

## 2021-05-10 DIAGNOSIS — I28.1 PULMONARY ARTERY ANEURYSM (HCC): ICD-10-CM

## 2021-05-10 DIAGNOSIS — R06.02 SOB (SHORTNESS OF BREATH): Primary | ICD-10-CM

## 2021-05-10 DIAGNOSIS — J44.1 COPD EXACERBATION (HCC): ICD-10-CM

## 2021-05-10 PROBLEM — J44.9 COPD (CHRONIC OBSTRUCTIVE PULMONARY DISEASE) (HCC): Status: ACTIVE | Noted: 2021-05-10

## 2021-05-10 PROBLEM — E03.9 HYPOTHYROIDISM: Status: ACTIVE | Noted: 2021-05-10

## 2021-05-10 LAB
ALBUMIN SERPL BCP-MCNC: 4.1 G/DL (ref 3.4–4.8)
ALP SERPL-CCNC: 75.5 U/L (ref 10–129)
ALT SERPL W P-5'-P-CCNC: 10 U/L (ref 5–63)
ANION GAP SERPL CALCULATED.3IONS-SCNC: 5 MMOL/L (ref 4–13)
AST SERPL W P-5'-P-CCNC: 11 U/L (ref 15–41)
BASOPHILS # BLD AUTO: 0.03 THOUSANDS/ΜL (ref 0–0.1)
BASOPHILS NFR BLD AUTO: 0 % (ref 0–1)
BILIRUB SERPL-MCNC: 0.78 MG/DL (ref 0.3–1.2)
BNP SERPL-MCNC: 125.1 PG/ML (ref 1–100)
BUN SERPL-MCNC: 18 MG/DL (ref 6–20)
CALCIUM SERPL-MCNC: 9.3 MG/DL (ref 8.4–10.2)
CHLORIDE SERPL-SCNC: 94 MMOL/L (ref 96–108)
CO2 SERPL-SCNC: 42 MMOL/L (ref 22–33)
CREAT SERPL-MCNC: 0.84 MG/DL (ref 0.5–1.2)
DIGOXIN SERPL-MCNC: 0.8 NG/ML (ref 0.8–2)
EOSINOPHIL # BLD AUTO: 0.14 THOUSAND/ΜL (ref 0–0.61)
EOSINOPHIL NFR BLD AUTO: 2 % (ref 0–6)
ERYTHROCYTE [DISTWIDTH] IN BLOOD BY AUTOMATED COUNT: 13.4 % (ref 11.6–15.1)
GFR SERPL CREATININE-BSD FRML MDRD: 102 ML/MIN/1.73SQ M
GLUCOSE SERPL-MCNC: 99 MG/DL (ref 65–140)
HCT VFR BLD AUTO: 44.3 % (ref 36.5–49.3)
HGB BLD-MCNC: 14.2 G/DL (ref 12–17)
IMM GRANULOCYTES # BLD AUTO: 0.02 THOUSAND/UL (ref 0–0.2)
IMM GRANULOCYTES NFR BLD AUTO: 0 % (ref 0–2)
LYMPHOCYTES # BLD AUTO: 1.42 THOUSANDS/ΜL (ref 0.6–4.47)
LYMPHOCYTES NFR BLD AUTO: 18 % (ref 14–44)
MAGNESIUM SERPL-MCNC: 2 MG/DL (ref 1.6–2.6)
MCH RBC QN AUTO: 30.4 PG (ref 26.8–34.3)
MCHC RBC AUTO-ENTMCNC: 32.1 G/DL (ref 31.4–37.4)
MCV RBC AUTO: 95 FL (ref 82–98)
MONOCYTES # BLD AUTO: 0.56 THOUSAND/ΜL (ref 0.17–1.22)
MONOCYTES NFR BLD AUTO: 7 % (ref 4–12)
NEUTROPHILS # BLD AUTO: 5.93 THOUSANDS/ΜL (ref 1.85–7.62)
NEUTS SEG NFR BLD AUTO: 73 % (ref 43–75)
PLATELET # BLD AUTO: 198 THOUSANDS/UL (ref 149–390)
PMV BLD AUTO: 10.6 FL (ref 8.9–12.7)
POTASSIUM SERPL-SCNC: 4.3 MMOL/L (ref 3.5–5)
PROT SERPL-MCNC: 7.6 G/DL (ref 6.4–8.3)
RBC # BLD AUTO: 4.67 MILLION/UL (ref 3.88–5.62)
SARS-COV-2 RNA RESP QL NAA+PROBE: NEGATIVE
SODIUM SERPL-SCNC: 141 MMOL/L (ref 133–145)
TROPONIN I SERPL-MCNC: <0.03 NG/ML (ref 0–0.07)
TROPONIN I SERPL-MCNC: <0.03 NG/ML (ref 0–0.07)
TSH SERPL DL<=0.05 MIU/L-ACNC: 3.22 UIU/ML (ref 0.34–5.6)
WBC # BLD AUTO: 8.1 THOUSAND/UL (ref 4.31–10.16)

## 2021-05-10 PROCEDURE — 93005 ELECTROCARDIOGRAM TRACING: CPT

## 2021-05-10 PROCEDURE — 71275 CT ANGIOGRAPHY CHEST: CPT

## 2021-05-10 PROCEDURE — 80053 COMPREHEN METABOLIC PANEL: CPT | Performed by: PHYSICIAN ASSISTANT

## 2021-05-10 PROCEDURE — 94640 AIRWAY INHALATION TREATMENT: CPT

## 2021-05-10 PROCEDURE — 99220 PR INITIAL OBSERVATION CARE/DAY 70 MINUTES: CPT | Performed by: INTERNAL MEDICINE

## 2021-05-10 PROCEDURE — 71045 X-RAY EXAM CHEST 1 VIEW: CPT

## 2021-05-10 PROCEDURE — G1004 CDSM NDSC: HCPCS

## 2021-05-10 PROCEDURE — 94002 VENT MGMT INPAT INIT DAY: CPT

## 2021-05-10 PROCEDURE — 85025 COMPLETE CBC W/AUTO DIFF WBC: CPT | Performed by: PHYSICIAN ASSISTANT

## 2021-05-10 PROCEDURE — 94664 DEMO&/EVAL PT USE INHALER: CPT

## 2021-05-10 PROCEDURE — 84484 ASSAY OF TROPONIN QUANT: CPT | Performed by: PHYSICIAN ASSISTANT

## 2021-05-10 PROCEDURE — 97162 PT EVAL MOD COMPLEX 30 MIN: CPT

## 2021-05-10 PROCEDURE — 84443 ASSAY THYROID STIM HORMONE: CPT | Performed by: INTERNAL MEDICINE

## 2021-05-10 PROCEDURE — 99285 EMERGENCY DEPT VISIT HI MDM: CPT

## 2021-05-10 PROCEDURE — 94668 MNPJ CHEST WALL SBSQ: CPT

## 2021-05-10 PROCEDURE — 83735 ASSAY OF MAGNESIUM: CPT | Performed by: INTERNAL MEDICINE

## 2021-05-10 PROCEDURE — 80162 ASSAY OF DIGOXIN TOTAL: CPT | Performed by: INTERNAL MEDICINE

## 2021-05-10 PROCEDURE — 83880 ASSAY OF NATRIURETIC PEPTIDE: CPT | Performed by: PHYSICIAN ASSISTANT

## 2021-05-10 PROCEDURE — 87635 SARS-COV-2 COVID-19 AMP PRB: CPT | Performed by: PHYSICIAN ASSISTANT

## 2021-05-10 PROCEDURE — 84484 ASSAY OF TROPONIN QUANT: CPT | Performed by: INTERNAL MEDICINE

## 2021-05-10 PROCEDURE — 36415 COLL VENOUS BLD VENIPUNCTURE: CPT | Performed by: PHYSICIAN ASSISTANT

## 2021-05-10 PROCEDURE — 94760 N-INVAS EAR/PLS OXIMETRY 1: CPT

## 2021-05-10 PROCEDURE — 99285 EMERGENCY DEPT VISIT HI MDM: CPT | Performed by: PHYSICIAN ASSISTANT

## 2021-05-10 RX ORDER — ACETAMINOPHEN 325 MG/1
650 TABLET ORAL EVERY 6 HOURS PRN
Status: DISCONTINUED | OUTPATIENT
Start: 2021-05-10 | End: 2021-05-16 | Stop reason: HOSPADM

## 2021-05-10 RX ORDER — POTASSIUM CHLORIDE 20 MEQ/1
20 TABLET, EXTENDED RELEASE ORAL 2 TIMES DAILY
Status: DISCONTINUED | OUTPATIENT
Start: 2021-05-10 | End: 2021-05-11

## 2021-05-10 RX ORDER — METOPROLOL TARTRATE 5 MG/5ML
2.5 INJECTION INTRAVENOUS EVERY 6 HOURS PRN
Status: DISCONTINUED | OUTPATIENT
Start: 2021-05-10 | End: 2021-05-16 | Stop reason: HOSPADM

## 2021-05-10 RX ORDER — AMMONIUM LACTATE 12 G/100G
CREAM TOPICAL 2 TIMES DAILY PRN
Status: DISCONTINUED | OUTPATIENT
Start: 2021-05-11 | End: 2021-05-16 | Stop reason: HOSPADM

## 2021-05-10 RX ORDER — DOCUSATE SODIUM 100 MG/1
100 CAPSULE, LIQUID FILLED ORAL 2 TIMES DAILY
Status: DISCONTINUED | OUTPATIENT
Start: 2021-05-10 | End: 2021-05-16 | Stop reason: HOSPADM

## 2021-05-10 RX ORDER — METHOCARBAMOL 500 MG/1
750 TABLET, FILM COATED ORAL EVERY 8 HOURS SCHEDULED
Status: DISCONTINUED | OUTPATIENT
Start: 2021-05-10 | End: 2021-05-16 | Stop reason: HOSPADM

## 2021-05-10 RX ORDER — DOCUSATE SODIUM 100 MG/1
100 CAPSULE, LIQUID FILLED ORAL 2 TIMES DAILY
COMMUNITY
End: 2021-07-08 | Stop reason: HOSPADM

## 2021-05-10 RX ORDER — SENNOSIDES 8.6 MG
1 TABLET ORAL
Status: DISCONTINUED | OUTPATIENT
Start: 2021-05-10 | End: 2021-05-16 | Stop reason: HOSPADM

## 2021-05-10 RX ORDER — FLUTICASONE PROPIONATE 50 MCG
1 SPRAY, SUSPENSION (ML) NASAL DAILY
COMMUNITY
End: 2021-07-08 | Stop reason: HOSPADM

## 2021-05-10 RX ORDER — SPIRONOLACTONE 25 MG/1
50 TABLET ORAL DAILY
Status: DISCONTINUED | OUTPATIENT
Start: 2021-05-10 | End: 2021-05-16 | Stop reason: HOSPADM

## 2021-05-10 RX ORDER — BUDESONIDE AND FORMOTEROL FUMARATE DIHYDRATE 160; 4.5 UG/1; UG/1
2 AEROSOL RESPIRATORY (INHALATION)
Status: DISCONTINUED | OUTPATIENT
Start: 2021-05-10 | End: 2021-05-16 | Stop reason: HOSPADM

## 2021-05-10 RX ORDER — DABIGATRAN ETEXILATE 150 MG/1
150 CAPSULE, COATED PELLETS ORAL ONCE
Status: COMPLETED | OUTPATIENT
Start: 2021-05-10 | End: 2021-05-10

## 2021-05-10 RX ORDER — DABIGATRAN ETEXILATE 150 MG/1
150 CAPSULE, COATED PELLETS ORAL EVERY 12 HOURS SCHEDULED
Status: DISCONTINUED | OUTPATIENT
Start: 2021-05-10 | End: 2021-05-16 | Stop reason: HOSPADM

## 2021-05-10 RX ORDER — MELATONIN
5000 DAILY
Status: DISCONTINUED | OUTPATIENT
Start: 2021-05-10 | End: 2021-05-16 | Stop reason: HOSPADM

## 2021-05-10 RX ORDER — ALBUTEROL SULFATE 2.5 MG/3ML
2.5 SOLUTION RESPIRATORY (INHALATION) EVERY 6 HOURS PRN
Status: DISCONTINUED | OUTPATIENT
Start: 2021-05-10 | End: 2021-05-16 | Stop reason: HOSPADM

## 2021-05-10 RX ORDER — POTASSIUM CHLORIDE 1.5 G/1.77G
20 POWDER, FOR SOLUTION ORAL 2 TIMES DAILY
Status: DISCONTINUED | OUTPATIENT
Start: 2021-05-10 | End: 2021-05-10

## 2021-05-10 RX ORDER — METHOCARBAMOL 750 MG/1
750 TABLET, FILM COATED ORAL EVERY 6 HOURS PRN
COMMUNITY
End: 2021-07-08 | Stop reason: HOSPADM

## 2021-05-10 RX ORDER — DABIGATRAN ETEXILATE 150 MG/1
150 CAPSULE, COATED PELLETS ORAL 2 TIMES DAILY
Status: DISCONTINUED | OUTPATIENT
Start: 2021-05-10 | End: 2021-05-10

## 2021-05-10 RX ORDER — FUROSEMIDE 10 MG/ML
40 INJECTION INTRAMUSCULAR; INTRAVENOUS ONCE
Status: DISCONTINUED | OUTPATIENT
Start: 2021-05-10 | End: 2021-05-10

## 2021-05-10 RX ORDER — NYSTATIN 100000 [USP'U]/G
POWDER TOPICAL 2 TIMES DAILY
Status: DISCONTINUED | OUTPATIENT
Start: 2021-05-10 | End: 2021-05-16 | Stop reason: HOSPADM

## 2021-05-10 RX ORDER — BUPROPION HYDROCHLORIDE 150 MG/1
300 TABLET ORAL DAILY
Status: DISCONTINUED | OUTPATIENT
Start: 2021-05-10 | End: 2021-05-16 | Stop reason: HOSPADM

## 2021-05-10 RX ORDER — DIGOXIN 125 MCG
125 TABLET ORAL DAILY
Status: DISCONTINUED | OUTPATIENT
Start: 2021-05-10 | End: 2021-05-10

## 2021-05-10 RX ORDER — MINERAL OIL AND PETROLATUM 150; 830 MG/G; MG/G
OINTMENT OPHTHALMIC
Status: DISCONTINUED | OUTPATIENT
Start: 2021-05-10 | End: 2021-05-16 | Stop reason: HOSPADM

## 2021-05-10 RX ORDER — DIGOXIN 125 MCG
125 TABLET ORAL DAILY
Status: DISCONTINUED | OUTPATIENT
Start: 2021-05-11 | End: 2021-05-16 | Stop reason: HOSPADM

## 2021-05-10 RX ORDER — HYDROXYZINE HYDROCHLORIDE 25 MG/1
25 TABLET, FILM COATED ORAL EVERY 8 HOURS SCHEDULED
Status: DISCONTINUED | OUTPATIENT
Start: 2021-05-10 | End: 2021-05-16 | Stop reason: HOSPADM

## 2021-05-10 RX ORDER — FUROSEMIDE 10 MG/ML
20 INJECTION INTRAMUSCULAR; INTRAVENOUS ONCE
Status: COMPLETED | OUTPATIENT
Start: 2021-05-10 | End: 2021-05-10

## 2021-05-10 RX ORDER — BUDESONIDE AND FORMOTEROL FUMARATE DIHYDRATE 160; 4.5 UG/1; UG/1
2 AEROSOL RESPIRATORY (INHALATION) 2 TIMES DAILY
COMMUNITY

## 2021-05-10 RX ORDER — FLUTICASONE PROPIONATE 50 MCG
1 SPRAY, SUSPENSION (ML) NASAL 2 TIMES DAILY
Status: DISCONTINUED | OUTPATIENT
Start: 2021-05-10 | End: 2021-05-16 | Stop reason: HOSPADM

## 2021-05-10 RX ORDER — B-COMPLEX WITH VITAMIN C
1 TABLET ORAL
Status: DISCONTINUED | OUTPATIENT
Start: 2021-05-11 | End: 2021-05-16 | Stop reason: HOSPADM

## 2021-05-10 RX ADMIN — FUROSEMIDE 20 MG: 10 INJECTION, SOLUTION INTRAMUSCULAR; INTRAVENOUS at 17:30

## 2021-05-10 RX ADMIN — FLUTICASONE PROPIONATE 1 SPRAY: 50 SPRAY, METERED NASAL at 17:29

## 2021-05-10 RX ADMIN — ACETAMINOPHEN 650 MG: 325 TABLET, FILM COATED ORAL at 20:17

## 2021-05-10 RX ADMIN — DABIGATRAN ETEXILATE MESYLATE 150 MG: 150 CAPSULE ORAL at 15:31

## 2021-05-10 RX ADMIN — BUDESONIDE AND FORMOTEROL FUMARATE DIHYDRATE 2 PUFF: 160; 4.5 AEROSOL RESPIRATORY (INHALATION) at 19:44

## 2021-05-10 RX ADMIN — METHOCARBAMOL TABLETS 750 MG: 500 TABLET, COATED ORAL at 15:27

## 2021-05-10 RX ADMIN — HYDROXYZINE HYDROCHLORIDE 25 MG: 25 TABLET ORAL at 22:21

## 2021-05-10 RX ADMIN — Medication 5000 UNITS: at 15:27

## 2021-05-10 RX ADMIN — IOHEXOL 100 ML: 350 INJECTION, SOLUTION INTRAVENOUS at 12:07

## 2021-05-10 RX ADMIN — WHITE PETROLATUM 57.7 %-MINERAL OIL 31.9 % EYE OINTMENT 1 APPLICATION: at 22:20

## 2021-05-10 RX ADMIN — DIGOXIN 125 MCG: 125 TABLET ORAL at 15:27

## 2021-05-10 RX ADMIN — BUPROPION HYDROCHLORIDE 300 MG: 150 TABLET, EXTENDED RELEASE ORAL at 15:27

## 2021-05-10 RX ADMIN — HYDROXYZINE HYDROCHLORIDE 25 MG: 25 TABLET ORAL at 15:27

## 2021-05-10 RX ADMIN — ALBUTEROL SULFATE 2.5 MG: 2.5 SOLUTION RESPIRATORY (INHALATION) at 14:47

## 2021-05-10 RX ADMIN — DABIGATRAN ETEXILATE MESYLATE 150 MG: 150 CAPSULE ORAL at 22:21

## 2021-05-10 RX ADMIN — METHOCARBAMOL TABLETS 750 MG: 500 TABLET, COATED ORAL at 22:21

## 2021-05-10 RX ADMIN — METOPROLOL TARTRATE 25 MG: 25 TABLET, FILM COATED ORAL at 20:12

## 2021-05-10 RX ADMIN — POTASSIUM CHLORIDE 20 MEQ: 1500 TABLET, EXTENDED RELEASE ORAL at 17:29

## 2021-05-10 NOTE — ASSESSMENT & PLAN NOTE
· Known case of COPD, former smoker, quit about 6 years ago, used to smoke 1 ppd x 20 years   · Is on symbicort at the nursing home   · Reports increased cough with increase  In amount of whitish sputum production, no change in color / consistency   · No wheeze heard in physical exam, however diminished breath sound and difficult to appreciate due to body habitus     · Patient has no mental status abnormalities, is alert and oriented x 3     PLAN :  · Continue symbicort while inpatient   · Will order albuterol nebs PRN

## 2021-05-10 NOTE — H&P
20 [facility] 1970, 49 y o  male MRN: [id number]  Unit/Bed#: -01 Encounter: [id number]  Primary Care Provider: Guerita Kaba MD   Date and time admitted to hospital: 5/10/2021  9:27 AM    Shortness of breath  Assessment & Plan  · Patient presented with SOB, started few days ago worsened on the morning of admission   · Associated symptoms : chest heaviness, orthopnea, PND, decreased urination with the current dose of lasix, increase in cough and sputum production, palpitations, subjective feeling of "fluid in his body / chest"   · No fever, chills, URTI symptoms, calf swelling / pain above baseline, no wheezing, change in color / consistency / character of sputum   · Vitals : saturating at 99% on baseline oxygen of 2 L, intermittent episodes of tachycardia  Monitor shows Afib with RVR - HR - sometimes increasing to 120s   · Labs : normal troponin, mildly elevated BNP - 125 ( patient is morbidly obese), elevated bicarbonate, normal renal function, no leukocytosis   · Chest CTA : ruled out PE, reported diffuse groundglass opacities -- could represent pulmonary edema or infectious / inflammatory process  · Etiology of SOB likely CHF exacerbation, in the setting of tachyarrhythmia       PLAN :   · Place in observation   · Order 1 dose of lasix 20 mg IV x 1 -- > assess urinary output  · Transthoracic echocardiogram   · Telemetry monitoring   · Oxygen supplementation via nasal cannula to maintain sats of 88-92 %   · Continue home inhaler - Symbicort, will order albuterol nebs PRN   · Strict I/O monitoring, daily weights, fluid restriction of 1500 ml   · Cardiac diet     Obesity hypoventilation syndrome ([address] )  Assessment & Plan  · K/C/O obesity hypoventilation syndrome   · Follows a pulmonologist as outpatient - last seen in March 2021   · Previous medical history is also suggestive of severe FILIPPO ( based on home sleep study ) and severe restrictive pattern on PFT · Patient is on continuous oxygen supplementation of 2 L   · Was advised during previous office visit to strictly use BiPAP at night for maximum benefit and weight loss --patient reports that he has not been fully compliant with his BiPAP use  PLAN:   · Respiratory therapy consult for BiPAP HS  And PRN     Morbid obesity ([facility] )  Assessment & Plan  · S/p gastric sleeve surgery in 2018   · Patient used to weigh approximately 650 pounds, lost about 250-260 pounds and weight has been stable since   · Patient follows with bariatric surgery - last seen on 04/21     PLAN :   · Continue outpatient management and follow up with bariatric surgery     DVT (deep venous thrombosis) ([facility] )  Assessment & Plan  · H/o DVT and PE, s/p IVC filter insertion in 2019   · Patient was supposed to have the filter removed, but could not, was referred to [facility] but he did not have that done as he switched insurance   · Is on anticoagulation with Pradaxa for Afib     · PLAN:   Continued anticoagulation   · Outpatient management of IVC filter     Acute on chronic diastolic congestive heart failure ([facility] )  Assessment & Plan  Wt Readings from Last 3 Encounters:   05/10/21 (!) 410 kg (903 lb 14 2 oz)   06/15/20 (!) 161 kg (355 lb 3 2 oz)   04/18/19 (!) 189 kg (417 lb)     · Followed up with cardiology as outpatient - last seen in Jan 2021   · As per chart review, echo reports were reviewed   Prior ECHO (2017, no recent ECHO available) results indicate poor endocardial definition, but overall normal systolic function   · As per chart review cardiology advised to continue lasix 40 mg PO BID, however patient has been only on 20 mg PO BID at the nursing facility     PLAN :   · Echocardiogram   · Lasix 20 mg IV x 1 --> assess response   · Daily weights, strict I/O monitoring, fluid restriction of 1500 ml/day   · Cardiology consult - input appreciated             Atrial fibrillation [facility])  Assessment & Plan  · Patient is a known case of atrial fibrillation, on anticoagulation with pradaxa   · Is also on digoxin 0 125 mg PO daily, and metoprolol tartrate 25 mg PO q12h   · Patient presented with SOB, chest discomfort but no hypoxia   · HR on admission - 109, has intermittent tachycardic episodes in the 110-120s     PLAN :   · Telemetry monitoring   · Give oral dose of metoprolol - reduced dose of 12 5 mg po q12h with holding parameters,  · Check digoxin level   · Will order lopressor IV PRN for HR > 120, with holding parameters   · Keep magnesium > 2, potassium > 4       Hypothyroidism  Assessment & Plan  · Patient has a history of hypothyroidism, was on levothyroxine 25 mcg PO daily   · This medication is not his list of medications that he takes at the nursing home   · Last TSH in August 2020 - 1 75     PLAN :   · Will check TSH and free T 4 levels   · Will confirm nursing  home medication regimen    COPD (chronic obstructive pulmonary disease) (HCC)  Assessment & Plan  · Known case of COPD, former smoker, quit about 6 years ago, used to smoke 1 ppd x 20 years   · Is on symbicort at the nursing home   · Reports increased cough with increase  In amount of whitish sputum production, no change in color / consistency   · No wheeze heard in physical exam, however diminished breath sound and difficult to appreciate due to body habitus     · Patient has no mental status abnormalities, is alert and oriented x 3     PLAN :  · Continue symbicort while inpatient   · Will order albuterol nebs PRN               INTERNAL MEDICINE RESIDENCY ADMISSION H&P     Name: Karol Dueñas   Age & Sex: 49 y o  male   MRN: [id number]  Unit/Bed#: -01   Encounter: [id number]  Primary Care Provider: Clement Sarmiento MD    Code Status: Level 1 - Full Code  Admission Status: OBSERVATION  Disposition: Patient requires Med/Surg with Telemetry        VTE Pharmacologic Prophylaxis: Reason for no pharmacologic prophylaxis- patient is anticoagulated with pradaxa   VTE Mechanical Prophylaxis: sequential compression device    CHIEF COMPLAINT     Chief Complaint   Patient presents with    Shortness of Breath     Patient presents with c/o SOB, cough and congestion  Symptoms started the last few days  HISTORY OF PRESENT ILLNESS     Patient is a 48 y/o male with a PMH of obesity hypoventilation syndrome, chronic hypoxic hypercapneic respiratory failure - on 2 L oxygen supplementation via nasal cannula, morbid obesity s/p gastric sleeve surgery in 2018, atrial fibrillation on anticoagulation with Pradaxa, right sided CHF, chronic lower extremity edema, GERD, COPD, anxiety/ depression, obstructive sleep apnea, H/o DVT/ PE, HTN who presented to the ED from [address] with shortness of breath  Patient's symptoms started a few days ago, however worsened this morning prompting him to come to the ED  He described it as "a heavy weight on his chest", making it difficult for him to breathe  He notices slight improvement in breathing while sitting upright, has had episodes in the past few days where he woke up in the middle of the night with SOB (PND)  He denies any recent increase in the number of pillows used, but does report that he "feels fluid in his body/ lungs"  As per nursing staff, patient refuses to be weighed daily, but seems like he gained weight  He is on chronic 2 L oxygen supplementation, with no increase in requirement  He has SOB, chronic productive of whitish sputum at baseline, and has noticed an increase in the amount of sputum, but no change in color / character / consistency  He does feel that he has been "urinating less" with the current dose of lasix for the past 1 week  He denies fever, chills, exposure to sick contacts, nausea, vomiting, change in appetite, urinary symptoms like dysuria, calf pain/ swelling above his baseline  He does have chronic abdominal pain from a an abdominal wall hernia which is being managed as outpatient     As per nursing staff, patient did not really complain of anything until this morning when he complained of SOB  He was partially mobile before, but since he got COVID in December 2020 -- he has been pretty much bed bound  He is on a regular diet at the nursing facility  In the ED,   Initial vitals : afebrile / HR - 99 bpm / RR - 18/ BP - 121/ 41 mmHg / saturating at 99 % on 2 L oxygen via nasal cannula  Labs : No leukocytosis, elevated bicarbonate - 42, which is an expected compensatory response to his chronic CO2 retention, normal renal function  COVID - negative  BNP - 125 1, but the fact that the patient is morbidly obese needs to be factored in which could under estimate his true BNP  Troponin - < 0 03  Imaging :   CT chest with IV contrast : ruled out PE, however reported diffuse ground glass opacities which could represent pulmonary edema / infectious / inflammatory process  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  CXR - cardiomegaly, with no obvious focal consolidation     Patient is being admitted under observation for further evaluation of SOB, likely secondary to acute on chronic CHF  REVIEW OF SYSTEMS     Review of Systems   Constitutional: Negative for appetite change, chills, diaphoresis and fever  HENT: Negative for rhinorrhea and sore throat  Respiratory: Positive for cough and shortness of breath  Negative for wheezing  Cardiovascular: Positive for palpitations and leg swelling  Does reports retrosternal chest discomfort   Gastrointestinal: Positive for abdominal pain  Negative for diarrhea, nausea and vomiting  Genitourinary: Negative for difficulty urinating and dysuria  Skin: Negative for rash  Neurological: Positive for light-headedness  Negative for dizziness, syncope, weakness and numbness  Psychiatric/Behavioral: Negative for behavioral problems       OBJECTIVE     Vitals:    05/10/21 0926 05/10/21 1121 05/10/21 1307 05/10/21 1417   BP: (!) 121/41 117/64 123/64 107/73   BP Location: Left arm Right arm Right arm Right arm   Pulse: 99 102 (!) 112 (!) 110   Resp: 19  22 20   Temp: 97 8 °F (36 6 °C)   98 6 °F (37 °C)   TempSrc: Oral   Tympanic   SpO2: 99% 95% 97% 96%   Weight: (!) 186 kg (410 lb)   (!) 410 kg (903 lb 14 2 oz)   Height: 5' 11" (1 803 m)   5' 10" (1 778 m)      Temperature:   Temp (24hrs), Av 2 °F (36 8 °C), Min:97 8 °F (36 6 °C), Max:98 6 °F (37 °C)    Temperature: 98 6 °F (37 °C)  Intake & Output:  I/O     None        Weights:   IBW (Ideal Body Weight): 73 kg    Body mass index is 129 69 kg/m²  Weight (last 2 days)     Date/Time   Weight    05/10/21 1417   (!) 410 (903 89)    05/10/21 0926   (!) 186 (410)            Physical Exam  Vitals signs and nursing note reviewed  Constitutional:       General: He is not in acute distress  Appearance: He is well-developed  He is obese  Comments: Morbidly obese   Connected to 2 L oxygen      HENT:      Head: Normocephalic and atraumatic  Mouth/Throat:      Mouth: Mucous membranes are moist    Eyes:      General:         Right eye: No discharge  Left eye: No discharge  Conjunctiva/sclera: Conjunctivae normal    Neck:      Musculoskeletal: Neck supple  Comments: Cannot appreciate JVD due to body habitus and thick neck   Cardiovascular:      Rate and Rhythm: Tachycardia present  Rhythm irregular  Pulses: Normal pulses  Heart sounds: Normal heart sounds  No murmur  Pulmonary:      Effort: Pulmonary effort is normal  No respiratory distress  Comments: Diminished breath sounds b/l cannot appreciate adventitious sounds due to body habitus   Abdominal:      Palpations: Abdomen is soft  Tenderness: There is no abdominal tenderness  Musculoskeletal:      Right lower leg: Edema present  Left lower leg: Edema present  Skin:     General: Skin is warm and dry  Capillary Refill: Capillary refill takes less than 2 seconds  Neurological:      General: No focal deficit present  Mental Status: He is alert and oriented to person, place, and time  Psychiatric:         Mood and Affect: Mood normal        PAST MEDICAL HISTORY     Past Medical History:   Diagnosis Date    Afib ([place] [address] )     Anemia     Anxiety     Cancer ([place] [address] )     Cardiac disease     Cellulitis     COPD (chronic obstructive pulmonary disease) ([place] [address] )     Depression     Diabetes mellitus ([place] [address] )     DVT (deep venous thrombosis) (HCC)     GERD (gastroesophageal reflux disease)     HBP (high blood pressure)     Heart failure (HCC)     Hx of blood clots     Hypertension     Hypokalemia     Hypothyroid     Obesity     Psychiatric disorder      PAST SURGICAL HISTORY     Past Surgical History:   Procedure Laterality Date    ABDOMINAL HERNIA REPAIR  05/2019    CHOLECYSTECTOMY      Lap    GASTRECTOMY SLEEVE LAPAROSCOPIC  08/2018    KNEE SURGERY Right     open wound, injury     LEG SURGERY Right 2009     SOCIAL & FAMILY HISTORY     Social History     Substance and Sexual Activity   Alcohol Use No     Substance and Sexual Activity   Alcohol Use No        Substance and Sexual Activity   Drug Use No     Social History     Tobacco Use   Smoking Status Former Smoker    Packs/day: 1 00    Years: 15 00    Pack years: 15 00   Smokeless Tobacco Never Used   Tobacco Comment    1 5ppd x 23 years, quit 2014     Family History   Problem Relation Age of Onset    Lung cancer Father     Diabetes Maternal Aunt     Heart attack Mother     Clotting disorder Mother     Hypertension Mother     Heart failure Mother     Diabetes Mother     Atrial fibrillation Mother     Sleep apnea Mother     Obesity Mother     Asthma Sister     Stroke Neg Hx     Anuerysm Neg Hx     Arrhythmia Neg Hx     Hyperlipidemia Neg Hx         pt unsure    Fainting Neg Hx      LABORATORY DATA     Labs: I have personally reviewed pertinent reports      Results from last 7 days   Lab Units 05/10/21  0950   WBC Thousand/uL 8 10   HEMOGLOBIN g/dL 14 2   HEMATOCRIT % 44 3   PLATELETS Thousands/uL 198   NEUTROS PCT % 73   MONOS PCT % 7      Results from last 7 days   Lab Units 05/10/21  0950   POTASSIUM mmol/L 4 3   CHLORIDE mmol/L 94*   CO2 mmol/L 42*   BUN mg/dL 18   CREATININE mg/dL 0 84   CALCIUM mg/dL 9 3   ALK PHOS U/L 75 5   ALT U/L 10   AST U/L 11*                      Results from last 7 days   Lab Units 05/10/21  0950   TROPONIN I ng/mL <0 03     Micro:  Lab Results   Component Value Date    BLOODCX No Growth After 5 Days  02/12/2017    BLOODCX Staphylococcus coagulase negative 02/12/2017    URINECX >100,000 cfu/ml Mixed Contaminants X3 09/13/2016    SPUTUMCULTUR 3+ Growth of Mixed Respiratory Cece 10/11/2016    SPUTUMCULTUR 4+ Growth of Mixed Respiratory Cece 09/29/2016    SPUTUMCULTUR 3+ Growth of Mixed Respiratory Cece 09/18/2016     IMAGING & DIAGNOSTIC TESTS     Imaging: I have personally reviewed pertinent reports  Xr Chest 1 View Portable    Result Date: 5/10/2021  Impression: Limited study  Probably no acute cardiopulmonary disease within limitations  Workstation performed: [id number]     Cta Ed Chest Pe Study    Result Date: 5/10/2021  Impression: 1  No pulmonary emboli within study limitations  2   Pulmonary artery aneurysm, now measuring 5 4 cm, previously 4 9 cm  3   Diffuse groundglass, which may represent pulmonary edema  or infectious/inflammatory process  No focal consolidation  Workstation performed: [id number]     EKG, Pathology, and Other Studies: I have personally reviewed pertinent reports  ALLERGIES     Allergies   Allergen Reactions    Penicillins Hives     MEDICATIONS PRIOR TO ARRIVAL     Prior to Admission medications    Medication Sig Start Date End Date Taking?  Authorizing Provider   acetaminophen (TYLENOL) 325 mg tablet Take 650 mg by mouth every 6 (six) hours as needed for mild pain   Yes Historical Provider, MD   budesonide-formoterol (SYMBICORT) 160-4 5 mcg/act inhaler Inhale 2 puffs 2 (two) times a day Rinse mouth after use     Yes Historical Provider, MD   calcium carbonate-vitamin D (OSCAL-D) 500 mg-200 units per tablet Take 1 tablet by mouth daily with breakfast for 30 days 11/29/16 5/10/21 Yes Idalia De Leon MD   Cholecalciferol (VITAMIN D) 50 MCG (2000 UT) tablet Take 2,000 Units by mouth daily   Yes Historical Provider, MD   cyanocobalamin (VITAMIN B-12) 1000 MCG tablet Take 1,000 mcg by mouth daily   Yes Historical Provider, MD   dabigatran etexilate (PRADAXA) 150 mg capsu Take 150 mg by mouth 2 (two) times a day   Yes Historical Provider, MD   digoxin (LANOXIN) 0 125 mg tablet Take 1 tablet by mouth daily for 30 days 11/29/16 5/10/21 Yes Idalia De Leon MD   docusate sodium (COLACE) 100 mg capsule Take 100 mg by mouth 2 (two) times a day   Yes Historical Provider, MD   Ferrous Sulfate  (45 Fe) MG TBCR Take by mouth   Yes Historical Provider, MD   fluticasone (FLONASE) 50 mcg/act nasal spray 1 spray into each nostril daily   Yes Historical Provider, MD   furosemide (LASIX) 80 mg tablet Take 1 tablet by mouth 2 (two) times a day for 30 days  Patient taking differently: Take 20 mg by mouth 2 (two) times a day  11/29/16 5/10/21 Yes Idalia De Leon MD   hydrOXYzine pamoate (VISTARIL) 25 mg capsule Take 25 mg by mouth 4 (four) times a day as needed for itching    Yes Historical Provider, MD   methocarbamol (ROBAXIN) 750 mg tablet Take 750 mg by mouth every 6 (six) hours as needed for muscle spasms   Yes Historical Provider, MD   metoprolol tartrate (LOPRESSOR) 25 mg tablet Take 1 tablet by mouth every 12 (twelve) hours for 30 days  Patient taking differently: Take 50 mg by mouth every 12 (twelve) hours   2/16/17 5/10/21 Yes RADHA Rodriguez   multivitamin (THERAGRAN) TABS Take 1 tablet by mouth daily   Yes Historical Provider, MD   potassium chloride (KLOR-CON) 20 mEq packet Take 20 mEq by mouth 2 (two) times a day   Yes Historical Provider, MD senna (SENOKOT) 8 6 MG tablet Take 1 tablet by mouth 2 (two) times a day for 30 days 11/29/16 5/10/21 Yes Derrell Ellis MD   spironolactone (ALDACTONE) 50 mg tablet Take 50 mg by mouth daily   Yes Historical Provider, MD   traZODone (DESYREL) 100 mg tablet Take 50 mg by mouth daily at bedtime   Yes Historical Provider, MD   acetaminophen-codeine (TYLENOL #3) 300-30 mg per tablet Take 1 tablet by mouth every 6 (six) hours as needed 1/8/11   Historical Provider, MD   albuterol (2 5 mg/3 mL) 0 083 % nebulizer solution Take 2 5 mg by nebulization every 6 (six) hours as needed for wheezing or shortness of breath    Historical Provider, MD   buPROPion (WELLBUTRIN) 100 mg tablet Take 1 tablet by mouth daily for 30 days  Patient taking differently: Take 150 mg by mouth daily  11/29/16 6/15/20  Derrell Ellis MD   cyclobenzaprine (FLEXERIL) 10 mg tablet Take 10 mg by mouth 3 (three) times a day as needed for muscle spasms    Historical Provider, MD   gabapentin (NEURONTIN) 300 mg capsule Take 1 capsule by mouth 2 (two) times a day as needed (pain) for up to 30 days 11/29/16 7/20/18  Derrell Ellis MD   ibuprofen (MOTRIN) 600 mg tablet Take 1 tablet by mouth every 6 (six) hours as needed for mild pain for up to 30 days  Patient not taking: Reported on 12/6/2019 11/29/16 12/29/16  Derrell Ellis MD   insulin glargine (LANTUS) 100 units/mL subcutaneous injection Inject 80 Units under the skin daily at bedtime for 30 days  Patient not taking: Reported on 12/6/2019 11/29/16 7/20/18  Derrell Ellis MD   ipratropium-albuterol (DUO-NEB) 0 5-2 5 mg/3 mL nebulizer solution Take 3 mL by nebulization 4 (four) times a day    Historical Provider, MD   levothyroxine 25 mcg tablet Take 25 mcg by mouth daily    Historical Provider, MD   loperamide (IMODIUM) 1 mg/5 mL oral liquid Take 2 mg by mouth once as needed for diarrhea    Historical Provider, MD   magnesium hydroxide (MILK OF MAGNESIA) 400 mg/5 mL oral suspension Take 30 mL by mouth every 12 (twelve) hours as needed for constipation for up to 30 days  Patient not taking: Reported on 12/6/2019 11/29/16 7/20/18  Silas Banks MD   Magnesium Oxide 250 MG TABS Take by mouth    Historical Provider, MD   menthol-methyl salicylate (BENGAY) 89-50 % cream Apply 1 application topically 4 (four) times a day as needed (pain) for up to 30 days  Patient not taking: Reported on 12/6/2019 11/29/16 4/18/19  Silas Bnaks MD   mupirocin (BACTROBAN) 2 % ointment Apply topically 3 (three) times a day    Historical Provider, MD   ondansetron (ZOFRAN) 4 mg tablet Take 8 mg by mouth every 8 (eight) hours as needed for nausea or vomiting     Historical Provider, MD   pantoprazole (PROTONIX) 20 mg tablet Take 20 mg by mouth daily    Historical Provider, MD   Pediatric Multiple Vit-C-FA ([facility] OMEGA-[facility]) CHEW Chew daily 60 mg    Historical Provider, MD   polyethylene glycol (MIRALAX) 17 g packet Take 17 g by mouth daily for 30 days 11/29/16 6/15/20  Silas Banks MD   primidone (MYSOLINE) 50 mg tablet Take 1 tablet by mouth daily at bedtime for 30 days 11/29/16 6/15/20  Silas Banks MD   ranitidine (ZANTAC) 150 mg tablet Take 150 mg by mouth 2 (two) times a day    Historical Provider, MD     MEDICATIONS ADMINISTERED IN LAST 24 HOURS     Medication Administration - last 24 hours from 05/09/2021 1453 to 05/10/2021 1453       Date/Time Order Dose Route Action Action by     05/10/2021 1207 iohexol (OMNIPAQUE) 350 MG/ML injection (SINGLE-DOSE) 100 mL 100 mL Intravenous Given Krystyna Notice     05/10/2021 1447 albuterol inhalation solution 2 5 mg 2 5 mg Nebulization Given RT Leann        CURRENT MEDICATIONS            Admission Time  I spent 30 minutes admitting the patient  This involved direct patient contact where I performed a full history and physical, reviewing previous records, and reviewing laboratory and other diagnostic studies      Portions of the record may have been created with voice recognition software  Occasional wrong word or "sound a like" substitutions may have occurred due to the inherent limitations of voice recognition software    Read the chart carefully and recognize, using context, where substitutions have occurred     ==  Magdi Gonsales MD  [address]  Internal Medicine Residency PGY-2

## 2021-05-10 NOTE — PLAN OF CARE
Problem: Potential for Falls  Goal: Patient will remain free of falls  Description: INTERVENTIONS:  - Assess patient frequently for physical needs  -  Identify cognitive and physical deficits and behaviors that affect risk of falls    -  [place] fall precautions as indicated by assessment   - Educate patient/family on patient safety including physical limitations  - Instruct patient to call for assistance with activity based on assessment  - Modify environment to reduce risk of injury  - Consider OT/PT consult to assist with strengthening/mobility  Outcome: Progressing     Problem: Prexisting or High Potential for Compromised Skin Integrity  Goal: Skin integrity is maintained or improved  Description: INTERVENTIONS:  - Identify patients at risk for skin breakdown  - Assess and monitor skin integrity  - Assess and monitor nutrition and hydration status  - Monitor labs   - Assess for incontinence   - Turn and reposition patient  - Assist with mobility/ambulation  - Relieve pressure over bony prominences  - Avoid friction and shearing  - Provide appropriate hygiene as needed including keeping skin clean and dry  - Evaluate need for skin moisturizer/barrier cream  - Collaborate with interdisciplinary team   - Patient/family teaching  - Consider wound care consult   Outcome: Progressing     Problem: PAIN - ADULT  Goal: Verbalizes/displays adequate comfort level or baseline comfort level  Description: Interventions:  - Encourage patient to monitor pain and request assistance  - Assess pain using appropriate pain scale  - Administer analgesics based on type and severity of pain and evaluate response  - Implement non-pharmacological measures as appropriate and evaluate response  - Consider cultural and social influences on pain and pain management  - Notify physician/advanced practitioner if interventions unsuccessful or patient reports new pain  Outcome: Progressing     Problem: INFECTION - ADULT  Goal: Absence or prevention of progression during hospitalization  Description: INTERVENTIONS:  - Assess and monitor for signs and symptoms of infection  - Monitor lab/diagnostic results  - Monitor all insertion sites, i e  indwelling lines, tubes, and drains  - Monitor endotracheal if appropriate and nasal secretions for changes in amount and color  - [place] appropriate cooling/warming therapies per order  - Administer medications as ordered  - Instruct and encourage patient and family to use good hand hygiene technique  - Identify and instruct in appropriate isolation precautions for identified infection/condition  Outcome: Progressing     Problem: SAFETY ADULT  Goal: Patient will remain free of falls  Description: INTERVENTIONS:  - Assess patient frequently for physical needs  -  Identify cognitive and physical deficits and behaviors that affect risk of falls    -  [place] fall precautions as indicated by assessment   - Educate patient/family on patient safety including physical limitations  - Instruct patient to call for assistance with activity based on assessment  - Modify environment to reduce risk of injury  - Consider OT/PT consult to assist with strengthening/mobility  Outcome: Progressing  Goal: Maintain or return to baseline ADL function  Description: INTERVENTIONS:  -  Assess patient's ability to carry out ADLs; assess patient's baseline for ADL function and identify physical deficits which impact ability to perform ADLs (bathing, care of mouth/teeth, toileting, grooming, dressing, etc )  - Assess/evaluate cause of self-care deficits   - Assess range of motion  - Assess patient's mobility; develop plan if impaired  - Assess patient's need for assistive devices and provide as appropriate  - Encourage maximum independence but intervene and supervise when necessary  - Involve family in performance of ADLs  - Assess for home care needs following discharge   - Consider OT consult to assist with ADL evaluation and planning for discharge  - Provide patient education as appropriate  Outcome: Progressing  Goal: Maintain or return mobility status to optimal level  Description: INTERVENTIONS:  - Assess patient's baseline mobility status (ambulation, transfers, stairs, etc )    - Identify cognitive and physical deficits and behaviors that affect mobility  - Identify mobility aids required to assist with transfers and/or ambulation (gait belt, sit-to-stand, lift, walker, cane, etc )  - [place] fall precautions as indicated by assessment  - Record patient progress and toleration of activity level on Mobility SBAR; progress patient to next Phase/Stage  - Instruct patient to call for assistance with activity based on assessment  - Consider rehabilitation consult to assist with strengthening/weightbearing, etc   Outcome: Progressing     Problem: DISCHARGE PLANNING  Goal: Discharge to home or other facility with appropriate resources  Description: INTERVENTIONS:  - Identify barriers to discharge w/patient and caregiver  - Arrange for needed discharge resources and transportation as appropriate  - Identify discharge learning needs (meds, wound care, etc )  - Arrange for interpretive services to assist at discharge as needed  - Refer to Case Management Department for coordinating discharge planning if the patient needs post-hospital services based on physician/advanced practitioner order or complex needs related to functional status, cognitive ability, or social support system  Outcome: Progressing     Problem: Knowledge Deficit  Goal: Patient/family/caregiver demonstrates understanding of disease process, treatment plan, medications, and discharge instructions  Description: Complete learning assessment and assess knowledge base    Interventions:  - Provide teaching at level of understanding  - Provide teaching via preferred learning methods  Outcome: Progressing

## 2021-05-10 NOTE — ASSESSMENT & PLAN NOTE
· K/C/O obesity hypoventilation syndrome   · Follows a pulmonologist as outpatient - last seen in March 2021   · Previous medical history is also suggestive of severe FILIPPO ( based on home sleep study ) and severe restrictive pattern on PFT   · Patient is on continuous oxygen supplementation of 2 L   · Was advised during previous office visit to strictly use BiPAP at night for maximum benefit and weight loss --patient reports that he has not been fully compliant with his BiPAP use       PLAN:   · Respiratory therapy consult for BiPAP HS  And PRN

## 2021-05-10 NOTE — RESPIRATORY THERAPY NOTE
RT Protocol Note  Randell King 49 y o  male MRN: [id number]  Unit/Bed#: -01 Encounter: [id number]    Assessment    Active Problems:    Shortness of breath    Morbid obesity (HCC)    Obesity hypoventilation syndrome (HCC)    Acute on chronic diastolic congestive heart failure (HCC)    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HCC)    DVT (deep venous thrombosis) (HCC)    Atrial fibrillation (HCC)    COPD (chronic obstructive pulmonary disease) (HCC)    Hypothyroidism      Home Pulmonary Medications:  Albuterol Q6 prn  Duoneb QID  Symbicort BID       Past Medical History:   Diagnosis Date    Afib ([facility] )     Anemia     Anxiety     Cancer ([place] [address] )     Cardiac disease     Cellulitis     COPD (chronic obstructive pulmonary disease) ([place] [address] )     Depression     Diabetes mellitus ([place] [address] )     [facility]     GERD (gastroesophageal reflux disease)     HBP (high blood pressure)     Heart failure ([facility])     Hx of blood clots     Hypertension     Hypokalemia     Hypothyroid     Obesity     Psychiatric disorder      Social History     Socioeconomic History    Marital status: Single     Spouse name: None    Number of children: None    Years of education: None    Highest education level: None   Occupational History    Occupation: Currently at [employer] resource strain: None    Food insecurity     Worry: None     Inability: None    Transportation needs     Medical: None     Non-medical: None   Tobacco Use    Smoking status: Former Smoker     Packs/day: 1 00     Years: 15 00     Pack years: 15 00    Smokeless tobacco: Never Used    Tobacco comment: 1 5ppd x 23 years, quit 2014   Substance and Sexual Activity    Alcohol use: No    Drug use: No    Sexual activity: None   Lifestyle    Physical activity     Days per week: None     Minutes per session: None    Stress: None   Relationships    Social connections     Talks on phone: None     Gets together: None Attends [employer] service: None     Active member of club or organization: None     Attends meetings of clubs or organizations: None     Relationship status: None    Intimate partner violence     Fear of current or ex partner: None     Emotionally abused: None     Physically abused: None     Forced sexual activity: None   Other Topics Concern    None   Social History Narrative    Do you currently or have you served in the [employer]: No    Were you activated, into active duty, as a member of the [employer] or as a Reservist: No    Live alone or with others: with others lives at the [facility]    Marital status: Single    Are you currently employed: No    Alcohol intake: None    Passive smoke exposure: Yes    Asbestos exposure: No    TB exposure: No    Environmental exposure: No    Animal exposure: Yes    uses pap nebulizer and oxygen       Subjective         Objective    Physical Exam:   Assessment Type: Pre-treatment  General Appearance: Alert, Awake  Respiratory Pattern: Normal, Spontaneous  Chest Assessment: Chest expansion symmetrical  Bilateral Breath Sounds: Diminished    Vitals:  Blood pressure 107/73, pulse (!) 110, temperature 98 6 °F (37 °C), temperature source Tympanic, resp  rate 20, height 5' 10" (1 778 m), weight (!) 410 kg (903 lb 14 2 oz), SpO2 96 %  Imaging and other studies: I have personally reviewed pertinent reports  Plan    Respiratory Plan: Mild Distress pathway        Resp Comments: (P) Patient was assessed per Respiratory Protocol  Patient has a history of COPD and was admitted due to increased SOB  He currently is on 3L supplemental oxygen via nasal cannula and is saturating at 98%  Home medications reviewed and CPAP ordered for HS and prn  He is a former smoker with a 15 pack year history  Respiratory to follow

## 2021-05-10 NOTE — ASSESSMENT & PLAN NOTE
· Patient presented with SOB, started few days ago worsened on the morning of admission   · Associated symptoms : chest heaviness, orthopnea, PND, decreased urination with the current dose of lasix, increase in cough and sputum production, palpitations, subjective feeling of "fluid in his body / chest"   · No fever, chills, URTI symptoms, calf swelling / pain above baseline, no wheezing, change in color / consistency / character of sputum   · Vitals : saturating at 99% on baseline oxygen of 2 L, intermittent episodes of tachycardia  Monitor shows Afib with RVR - HR - sometimes increasing to 120s   · Labs : normal troponin, mildly elevated BNP - 125 ( patient is morbidly obese), elevated bicarbonate, normal renal function, no leukocytosis   · Chest CTA : ruled out PE, reported diffuse groundglass opacities -- could represent pulmonary edema or infectious / inflammatory process  · Etiology of SOB likely CHF exacerbation, in the setting of tachyarrhythmia       PLAN :   · Place in observation   · Order 1 dose of lasix 20 mg IV x 1 -- > assess urinary output, then will order another dose tomorrow   · Transthoracic echocardiogram   · Telemetry monitoring   · Oxygen supplementation via nasal cannula to maintain sats of 88-92 %   · Continue home inhaler - Symbicort, will order albuterol nebs PRN   · Strict I/O monitoring, daily weights, fluid restriction of 1500 ml   · Cardiac diet

## 2021-05-10 NOTE — ASSESSMENT & PLAN NOTE
· Pulmonary artery aneurysm detected on CTA   · In comparison to imaging obtained in 2017, size increased from 4 9 cm --> 5 4 cm     PLAN :   · Continue outpatient management

## 2021-05-10 NOTE — QUICK NOTE
Patient complained of chest heaviness, which has been present for few days prior to admission  No radiation, aggravating or relieving factors  EKG on admission : Afib, with no acute ST-T changes, prolonged QTc - 537 ms     HEART score:    History 1=Moderate suspicious   ECG 0=Normal   Age 1= > 45 - <65 years   Risk Factors 2= > 3 risk factors, or history of atherosclerotic disease   Troponin 0= < Normal limit   Total 4       Score 0-3: 1 7% had a MACE risk    0 4% (1 patient)     36 4% of patients were in this low risk group    Score 4-6: 16 6% had a MACE risk    Score 7-10: 50 1% had a MACE risk    PLAN :   1  Will order serial troponin levels x 2 --> at 1:15 pm and then at 16:14 pm   2  Avoid QTc prolonging drugs   3  Keep electrolytes - magnesium > 2, potassium > 4   4  Telemetry monitoring   5   Cardiology consult - input appreciated

## 2021-05-10 NOTE — ASSESSMENT & PLAN NOTE
· Patient is a known case of atrial fibrillation, on anticoagulation with pradaxa   · Is also on digoxin 0 125 mg PO daily, and metoprolol tartrate 25 mg PO q12h   · Patient presented with SOB, chest discomfort but no hypoxia   · HR on admission - 109, has intermittent tachycardic episodes in the 110-120s     PLAN :   · Telemetry monitoring   · Give oral dose of metoprolol- at reduced dose of 12 5 mg PO q12h ( as BP is soft ) as patient has not taken any of his morning medications prior to admission    · Can administer digoxin after levels have been obtained   · Will order lopressor IV PRN for HR > 120, with holding parameters   · Keep magnesium > 2, potassium > 4

## 2021-05-10 NOTE — ASSESSMENT & PLAN NOTE
· Patient has a history of hypothyroidism, was on levothyroxine 25 mcg PO daily   · This medication is not his list of medications that he takes at the nursing home   · Last TSH in August 2020 - 1 75     PLAN :   · Will check TSH and free T 4 levels   · Will confirm nursing  home medication regimen

## 2021-05-10 NOTE — ED NOTES
Patient transported to [address] [place] [address], [address] [place] [address]  05/10/21 0501

## 2021-05-10 NOTE — ASSESSMENT & PLAN NOTE
Wt Readings from Last 3 Encounters:   05/10/21 (!) 410 kg (903 lb 14 2 oz)   06/15/20 (!) 161 kg (355 lb 3 2 oz)   04/18/19 (!) 189 kg (417 lb)     · Followed up with cardiology as outpatient - last seen in Jan 2021   · As per chart review, echo reports were reviewed   Prior ECHO (2017, no recent ECHO available) results indicate poor endocardial definition, but overall normal systolic function   · As per chart review cardiology advised to continue lasix 40 mg PO BID, however patient has been only on 20 mg PO BID at the nursing facility     PLAN :   · Echocardiogram   · Lasix 20 mg IV x 1 --> assess response  · Daily weights, strict I/O monitoring, fluid restriction of 1500 ml/day   · Cardiology consult - input appreciated

## 2021-05-10 NOTE — ED PROVIDER NOTES
History  Chief Complaint   Patient presents with    Shortness of Breath     Patient presents with c/o SOB, cough and congestion  Symptoms started the last few days  Pt with PMH: Afib, on Pradaxa and Dig, Anemia, Anxiety/Depression, Cancer, CAD, Cellulitis, COPD, Diabetes mellitus, DVT, GERD, HTN, Chronic diastolic CHF, Hypothyroid, Obesity, Acute on chronic respiratory failure with hypoxia and hypercapnia, MRSA, Tracheobronchitis, Obesity hypoventilation syndrome, PA (pulmonary artery aneurysm), Recurrent bronchospasm, Venous stasis dermatitis of both lower extremities,   Past Surgical History: ABDOMINAL HERNIA REPAIR 05/2019, Lap CHOLECYSTECTOMY, GASTRECTOMY SLEEVE LAPAROSCOPIC 08/2018, Right KNEE SURGERY   + COVID in Dec, has since been vaccinated  Presents to ED from NH for further evaluation of heaviness, "feels like a weight sitting on his chest", for the past few days, worse over the past 2 with SOB, despite 2L chronic NC O2, cough that pt states takes him awhile to feel like he has to bring something up, "feels like there is fluid in his lungs", no fever, no abd pain, no NVD, no urinary complaints; pt is at NH due to chronic conditions, since COVID in Dec, has not been ambulatory, does have therapy in NH  Prior to Admission Medications   Prescriptions Last Dose Informant Patient Reported? Taking?    Cholecalciferol (VITAMIN D) 50 MCG (2000 UT) tablet 5/10/2021 at Unknown time Outside Facility (Specify) Yes Yes   Sig: Take 2,000 Units by mouth daily   Ferrous Sulfate  (45 Fe) MG TBCR 5/10/2021 at Unknown time Outside Facility ([facility]) Yes Yes   Sig: Take by mouth   Magnesium Oxide 250 MG TABS Not Taking at Unknown time Outside Facility (Specify) Yes No   Sig: Take by mouth   Pediatric Multiple Vit-C-FA (FLINSTONES GUMMIES OMEGA-3 DHA) CHEW Not Taking at Unknown time Outside Facility (Specify) Yes No   Sig: Chew daily 60 mg   acetaminophen (TYLENOL) 325 mg tablet Past Week at Unknown time Outside Facility ([address] [place] [address]) Yes Yes   Sig: Take 650 mg by mouth every 6 (six) hours as needed for mild pain   acetaminophen-codeine (TYLENOL #3) 300-30 mg per tablet Not Taking at Unknown time Outside Facility (Specify) Yes No   Sig: Take 1 tablet by mouth every 6 (six) hours as needed   albuterol (2 5 mg/3 mL) 0 083 % nebulizer solution Not Taking at Unknown time Outside Facility (Specify) Yes No   Sig: Take 2 5 mg by nebulization every 6 (six) hours as needed for wheezing or shortness of breath   buPROPion (WELLBUTRIN) 100 mg tablet  Outside Facility (Specify) No No   Sig: Take 1 tablet by mouth daily for 30 days   Patient taking differently: Take 150 mg by mouth daily    budesonide-formoterol (SYMBICORT) 160-4 5 mcg/act inhaler 5/10/2021 at Unknown time  Yes Yes   Sig: Inhale 2 puffs 2 (two) times a day Rinse mouth after use     calcium carbonate-vitamin D (OSCAL-D) 500 mg-200 units per tablet 5/10/2021 at Unknown time Outside Facility ([address] [place] [address]) No Yes   Sig: Take 1 tablet by mouth daily with breakfast for 30 days   cyanocobalamin (VITAMIN B-12) 1000 MCG tablet 5/10/2021 at Unknown time Outside Facility ([address] [place] [address]) Yes Yes   Sig: Take 1,000 mcg by mouth daily   cyclobenzaprine (FLEXERIL) 10 mg tablet Not Taking at Unknown time Outside Facility (Specify) Yes No   Sig: Take 10 mg by mouth 3 (three) times a day as needed for muscle spasms   dabigatran etexilate (PRADAXA) 150 mg capsu 5/10/2021 at Unknown time Outside Facility ([address] [place] [address]) Yes Yes   Sig: Take 150 mg by mouth 2 (two) times a day   digoxin (LANOXIN) 0 125 mg tablet 5/10/2021 at Unknown time Outside Facility (Specify) No Yes   Sig: Take 1 tablet by mouth daily for 30 days   docusate sodium (COLACE) 100 mg capsule 5/10/2021 at Unknown time  Yes Yes   Sig: Take 100 mg by mouth 2 (two) times a day   fluticasone (FLONASE) 50 mcg/act nasal spray 5/10/2021 at Unknown time  Yes Yes   Si spray into each nostril daily   furosemide (LASIX) 80 mg tablet 5/10/2021 at Unknown time Outside Facility (Specify) No Yes   Sig: Take 1 tablet by mouth 2 (two) times a day for 30 days   Patient taking differently: Take 20 mg by mouth 2 (two) times a day    gabapentin (NEURONTIN) 300 mg capsule  Outside Facility (Specify) No No   Sig: Take 1 capsule by mouth 2 (two) times a day as needed (pain) for up to 30 days   hydrOXYzine pamoate (VISTARIL) 25 mg capsule 5/10/2021 at Unknown time Outside Facility (Specify) Yes Yes   Sig: Take 25 mg by mouth 4 (four) times a day as needed for itching    ibuprofen (MOTRIN) 600 mg tablet   No No   Sig: Take 1 tablet by mouth every 6 (six) hours as needed for mild pain for up to 30 days   Patient not taking: Reported on 12/6/2019   insulin glargine (LANTUS) 100 units/mL subcutaneous injection  Outside Facility (Specify) No No   Sig: Inject 80 Units under the skin daily at bedtime for 30 days   Patient not taking: Reported on 12/6/2019   ipratropium-albuterol (DUO-NEB) 0 5-2 5 mg/3 mL nebulizer solution Not Taking at Unknown time Outside Facility (Specify) Yes No   Sig: Take 3 mL by nebulization 4 (four) times a day   levothyroxine 25 mcg tablet Not Taking at Unknown time Outside Facility (Specify) Yes No   Sig: Take 25 mcg by mouth daily   loperamide (IMODIUM) 1 mg/5 mL oral liquid Not Taking at Unknown time Outside Facility (Specify) Yes No   Sig: Take 2 mg by mouth once as needed for diarrhea   magnesium hydroxide (MILK OF MAGNESIA) 400 mg/5 mL oral suspension  Outside Facility (Specify) No No   Sig: Take 30 mL by mouth every 12 (twelve) hours as needed for constipation for up to 30 days   Patient not taking: Reported on 12/6/2019   menthol-methyl salicylate (BENGAY) 45-69 % cream  Outside Facility (Specify) No No   Sig: Apply 1 application topically 4 (four) times a day as needed (pain) for up to 30 days   Patient not taking: Reported on 12/6/2019   methocarbamol (ROBAXIN) 750 mg tablet 5/10/2021 at Unknown time  Yes Yes   Sig: Take 750 mg by mouth every 6 (six) hours as needed for muscle spasms   metoprolol tartrate (LOPRESSOR) 25 mg tablet 5/10/2021 at Unknown time Outside Facility ([address] [place] [address]) No Yes   Sig: Take 1 tablet by mouth every 12 (twelve) hours for 30 days   Patient taking differently: Take 50 mg by mouth every 12 (twelve) hours     multivitamin (THERAGRAN) TABS 5/10/2021 at Unknown time Outside Facility ([address] [place] [address]) Yes Yes   Sig: Take 1 tablet by mouth daily   mupirocin (BACTROBAN) 2 % ointment Not Taking at Unknown time Outside Facility (Specify) Yes No   Sig: Apply topically 3 (three) times a day   ondansetron (ZOFRAN) 4 mg tablet Not Taking at Unknown time Outside Facility (Specify) Yes No   Sig: Take 8 mg by mouth every 8 (eight) hours as needed for nausea or vomiting    pantoprazole (PROTONIX) 20 mg tablet Not Taking at Unknown time Outside Facility (Specify) Yes No   Sig: Take 20 mg by mouth daily   polyethylene glycol (MIRALAX) 17 g packet  Outside Facility (Specify) No No   Sig: Take 17 g by mouth daily for 30 days   potassium chloride (KLOR-CON) 20 mEq packet 5/10/2021 at Unknown time Outside Facility (Specify) Yes Yes   Sig: Take 20 mEq by mouth 2 (two) times a day   primidone (MYSOLINE) 50 mg tablet  Outside Facility (Specify) No No   Sig: Take 1 tablet by mouth daily at bedtime for 30 days   ranitidine (ZANTAC) 150 mg tablet  Outside Facility (Specify) Yes No   Sig: Take 150 mg by mouth 2 (two) times a day   senna (SENOKOT) 8 6 MG tablet 5/10/2021 at Unknown time Outside Facility (Specify) No Yes   Sig: Take 1 tablet by mouth 2 (two) times a day for 30 days   spironolactone (ALDACTONE) 50 mg tablet 5/10/2021 at Unknown time Outside Facility (Specify) Yes Yes   Sig: Take 50 mg by mouth daily   traZODone (DESYREL) 100 mg tablet 5/10/2021 at Unknown time Outside Facility (Specify) Yes Yes   Sig: Take 50 mg by mouth daily at bedtime      Facility-Administered Medications: None       Past Medical History:   Diagnosis Date    Afib (HCC)     Anemia  Anxiety     Cancer ([place] [address] )     Cardiac disease     Cellulitis     COPD (chronic obstructive pulmonary disease) ([place] [address] )     Depression     Diabetes mellitus ([place] [address] )     [facility]     GERD (gastroesophageal reflux disease)     HBP (high blood pressure)     Heart failure (HCC)     Hx of blood clots     Hypertension     Hypokalemia     Hypothyroid     Obesity     Psychiatric disorder        Past Surgical History:   Procedure Laterality Date    ABDOMINAL HERNIA REPAIR  05/2019    CHOLECYSTECTOMY      Lap    GASTRECTOMY SLEEVE LAPAROSCOPIC  08/2018    KNEE SURGERY Right     open wound, injury     LEG SURGERY Right 2009       Family History   Problem Relation Age of Onset    Lung cancer Father     Diabetes Maternal Aunt     Heart attack Mother     Clotting disorder Mother     Hypertension Mother     Heart failure Mother     Diabetes Mother     Atrial fibrillation Mother     Sleep apnea Mother     Obesity Mother     Asthma Sister     Stroke Neg Hx     Anuerysm Neg Hx     Arrhythmia Neg Hx     Hyperlipidemia Neg Hx         pt unsure    Fainting Neg Hx      I have reviewed and agree with the history as documented  E-Cigarette/Vaping    E-Cigarette Use Never User      E-Cigarette/Vaping Substances     Social History     Tobacco Use    Smoking status: Former Smoker     Packs/day: 1 00     Years: 15 00     Pack years: 15 00    Smokeless tobacco: Never Used    Tobacco comment: 1 5ppd x 23 years, quit 2014   Substance Use Topics    Alcohol use: No    Drug use: No       Review of Systems   Constitutional: Negative for chills and fever  HENT: Negative for ear pain, hearing loss, mouth sores, sore throat and trouble swallowing  Eyes: Negative for visual disturbance  Respiratory: Positive for shortness of breath  Negative for cough  Cardiovascular: Positive for chest pain and leg swelling     Gastrointestinal: Negative for abdominal pain, constipation, diarrhea and vomiting  Genitourinary: Negative for dysuria, frequency and genital sores  Musculoskeletal: Positive for gait problem  Negative for arthralgias and myalgias  Skin: Negative for color change and pallor  Neurological: Negative for dizziness, weakness, light-headedness and headaches  Psychiatric/Behavioral: Negative for behavioral problems and self-injury  All other systems reviewed and are negative  Physical Exam  Physical Exam  Vitals signs and nursing note reviewed  Constitutional:       General: He is not in acute distress  Appearance: He is well-developed  He is obese  HENT:      Head: Normocephalic and atraumatic  Right Ear: External ear normal       Left Ear: External ear normal       Nose: Nose normal       Mouth/Throat:      Mouth: Mucous membranes are moist       Pharynx: Oropharynx is clear  Eyes:      Conjunctiva/sclera: Conjunctivae normal    Neck:      Musculoskeletal: Normal range of motion  Cardiovascular:      Rate and Rhythm: Regular rhythm  Tachycardia present  Pulmonary:      Effort: Pulmonary effort is normal  No respiratory distress  Breath sounds: Decreased breath sounds (at bases) present  No wheezing  Chest:      Chest wall: Edema present  Abdominal:      General: Bowel sounds are normal       Palpations: Abdomen is soft  Tenderness: There is no abdominal tenderness  Musculoskeletal: Normal range of motion  Skin:     General: Skin is warm and dry  Capillary Refill: Capillary refill takes less than 2 seconds  Findings: No rash  Neurological:      General: No focal deficit present  Mental Status: He is alert and oriented to person, place, and time     Psychiatric:         Behavior: Behavior normal          Vital Signs  ED Triage Vitals [05/10/21 0926]   Temperature Pulse Respirations Blood Pressure SpO2   97 8 °F (36 6 °C) 99 19 (!) 121/41 99 %      Temp Source Heart Rate Source Patient Position - Orthostatic VS BP Location FiO2 (%)   Oral Monitor Sitting Left arm --      Pain Score       5           Vitals:    05/10/21 0926 05/10/21 1121   BP: (!) 121/41 117/64   Pulse: 99 102   Patient Position - Orthostatic VS: Sitting Lying         Visual Acuity      ED Medications  Medications   iohexol (OMNIPAQUE) 350 MG/ML injection (SINGLE-DOSE) 100 mL (100 mL Intravenous Given 5/10/21 1207)       Diagnostic Studies  Results Reviewed     Procedure Component Value Units Date/Time    Novel Coronavirus (Covid-19),PCR [facility] - 2 Hour Stat [[id number]]  (Normal) Collected: 05/10/21 0950    Lab Status: Final result Specimen: Nares from Nose Updated: 05/10/21 1101     SARS-CoV-2 Negative    Narrative: The specimen collection materials, transport medium, and/or testing methodology utilized in the production of these test results have been proven to be reliable in a limited validation with an abbreviated program under the Emergency Utilization Authorization provided by the FDA  Testing reported as "Presumptive positive" will be confirmed with secondary testing to ensure result accuracy  Clinical caution and judgement should be used with the interpretation of these results with consideration of the clinical impression and other laboratory testing  Testing reported as "Positive" or "Negative" has been proven to be accurate according to standard laboratory validation requirements  All testing is performed with control materials showing appropriate reactivity at standard intervals        Comprehensive metabolic panel [[id number]]  (Abnormal) Collected: 05/10/21 0950    Lab Status: Final result Specimen: Blood from Arm, Left Updated: 05/10/21 1048     Sodium 141 mmol/L      Potassium 4 3 mmol/L      Chloride 94 mmol/L      CO2 42 mmol/L      ANION GAP 5 mmol/L      BUN 18 mg/dL      Creatinine 0 84 mg/dL      Glucose 99 mg/dL      Calcium 9 3 mg/dL      AST 11 U/L      ALT 10 U/L      Alkaline Phosphatase 75 5 U/L      Total Protein 7 6 g/dL Albumin 4 1 g/dL      Total Bilirubin 0 78 mg/dL      eGFR 102 ml/min/1 73sq m     Narrative:      [place] guidelines for Chronic Kidney Disease (CKD):     Stage 1 with normal or high GFR (GFR > 90 mL/min/1 73 square meters)    Stage 2 Mild CKD (GFR = 60-89 mL/min/1 73 square meters)    Stage 3A Moderate CKD (GFR = 45-59 mL/min/1 73 square meters)    Stage 3B Moderate CKD (GFR = 30-44 mL/min/1 73 square meters)    Stage 4 Severe CKD (GFR = 15-29 mL/min/1 73 square meters)    Stage 5 End Stage CKD (GFR <15 mL/min/1 73 square meters)  Note: GFR calculation is accurate only with a steady state creatinine    B-Type Natriuretic Peptide [facility] ONLY) [[id number]]  (Abnormal) Collected: 05/10/21 0950    Lab Status: Final result Specimen: Blood from Arm, Left Updated: 05/10/21 1037      1 pg/mL     Troponin I [[id number]]  (Normal) Collected: 05/10/21 0950    Lab Status: Final result Specimen: Blood from Arm, Left Updated: 05/10/21 1034     Troponin I <0 03 ng/mL     CBC and differential [[id number]]  (Normal) Collected: 05/10/21 0950    Lab Status: Final result Specimen: Blood from Arm, Left Updated: 05/10/21 1005     WBC 8 10 Thousand/uL      RBC 4 67 Million/uL      Hemoglobin 14 2 g/dL      Hematocrit 44 3 %      MCV 95 fL      MCH 30 4 pg      MCHC 32 1 g/dL      RDW 13 4 %      MPV 10 6 fL      Platelets 576 Thousands/uL      Neutrophils Relative 73 %      Immat GRANS % 0 %      Lymphocytes Relative 18 %      Monocytes Relative 7 %      Eosinophils Relative 2 %      Basophils Relative 0 %      Neutrophils Absolute 5 93 Thousands/µL      Immature Grans Absolute 0 02 Thousand/uL      Lymphocytes Absolute 1 42 Thousands/µL      Monocytes Absolute 0 56 Thousand/µL      Eosinophils Absolute 0 14 Thousand/µL      Basophils Absolute 0 03 Thousands/µL                  CTA ED chest PE study   Final Result by Jayson Esparza MD (05/10 1224)         1    No pulmonary emboli within study limitations  2   Pulmonary artery aneurysm, now measuring 5 4 cm, previously 4 9 cm          3   Diffuse groundglass, which may represent pulmonary edema  or infectious/inflammatory process  No focal consolidation  Workstation performed: [id number]         XR chest 1 view portable   ED Interpretation by Marly Erazo PA-C (05/10 1022)   Cm, increased vascular markings, poor inspiratory effort likely due to habitus      Final Result by Antony Freedman DO (05/10 1029)      Limited study  Probably no acute cardiopulmonary disease within limitations  Workstation performed: [id number]                    Procedures  ECG 12 Lead Documentation Only    Date/Time: 5/10/2021 9:32 AM  Performed by: Marly Erazo PA-C  Authorized by: Marly Erazo PA-C     Indications / Diagnosis:  Sob  ECG reviewed by me, the ED Provider: yes    Patient location:  ED  Previous ECG:     Comparison to cardiac monitor: Yes    Interpretation:     Interpretation: abnormal    Rate:     ECG rate:  107    ECG rate assessment: tachycardic    Rhythm:     Rhythm: atrial fibrillation    Comments:      No acute ischemic changes             ED Course                             SBIRT 22yo+      Most Recent Value   SBIRT (25 yo +)   In order to provide better care to our patients, we are screening all of our patients for alcohol and drug use  Would it be okay to ask you these screening questions?   No Filed at: 05/10/2021 2530          Wells' Criteria for PE      Most Recent Value   Wells' Criteria for PE   Clinical signs and symptoms of DVT  3 Filed at: 05/10/2021 4372   PE is primary diagnosis or equally likely  3 Filed at: 05/10/2021 0943   HR >100  1 5 Filed at: 05/10/2021 0943   Immobilization at least 3 days or Surgery in the previous 4 weeks  1 5 Filed at: 05/10/2021 0943   Previous, objectively diagnosed PE or DVT  1 5 Filed at: 05/10/2021 0943   Hemoptysis  0 Filed at: 05/10/2021 7914   Malignancy with treatment within 6 months or palliative  0 Filed at: 05/10/2021 0507   Low' Criteria Total  10 5 Filed at: 05/10/2021 7480                MDM  Number of Diagnoses or Management Options  Chest pain, unspecified type:   [facility] ):   Obesity:   SOB (shortness of breath):   Diagnosis management comments: Will admit for further evaluation, copd vs chf vs chronic deconditioning       Amount and/or Complexity of Data Reviewed  Clinical lab tests: ordered and reviewed  Tests in the radiology section of CPT®: ordered and reviewed  Review and summarize past medical records: yes  Discuss the patient with other providers: yes        Disposition  Final diagnoses:   SOB (shortness of breath)   Chest pain, unspecified type   COPD exacerbation ([facility] )   Obesity     Time reflects when diagnosis was documented in both MDM as applicable and the Disposition within this note     Time User Action Codes Description Comment    5/10/2021 12:41 PM Jw Salvia Add [R06 02] SOB (shortness of breath)     5/10/2021 12:41 PM Jw Salvia Add [R07 9] Chest pain, unspecified type     5/10/2021 12:42 PM Jw Salvia Add [J44 1] COPD exacerbation ([facility] )     5/10/2021 12:42 PM Jw Salvia Add [E66 9] Obesity       ED Disposition     ED Disposition Condition Date/Time Comment    Admit Stable Mon May 10, 2021 12:41 PM Case was discussed with Sánchez Quach and the patient's admission status was agreed to be Admission Status: observation status to the service of Dr Sánchez Quach   Follow-up Information    None         Patient's Medications   Discharge Prescriptions    No medications on file     No discharge procedures on file      PDMP Review     None          ED Provider  Electronically Signed by           Carlos Alberto Ansari PA-C  05/10/21 6342

## 2021-05-10 NOTE — ED NOTES
Inpatient provider to see patient in ED before transport to the floor        Maribell Rios RN  05/10/21 1396

## 2021-05-10 NOTE — ASSESSMENT & PLAN NOTE
· H/o DVT and PE, s/p IVC filter insertion in 2019   · Patient was supposed to have the filter removed, but could not, was referred to [facility] but he did not have that done as he switched insurance   · Is on anticoagulation with Pradaxa for Afib     · PLAN:   Continue anticoagulation with Pradaxa

## 2021-05-10 NOTE — ASSESSMENT & PLAN NOTE
· S/p gastric sleeve surgery in 2018   · Patient used to weigh approximately 650 pounds, lost about 250-260 pounds and weight has been stable since   · Patient follows with bariatric surgery - last seen on 04/21     PLAN :   · Continue outpatient management and follow up with bariatric surgery

## 2021-05-10 NOTE — PHYSICAL THERAPY NOTE
PHYSICAL THERAPY EVALUATION  Time: 9225-9605    NAME:  Devonte Hester  DATE: 05/10/21    AGE:   49 y o  Mrn:   [id number]  Length Of Stay: 0    ADMIT DX:  Shortness of breath [R06 02]  SOB (shortness of breath) [R06 02]  Obesity [E66 9]  COPD exacerbation (HCC) [J44 1]  Chest pain, unspecified type [R07 9]    Past Medical History:   Diagnosis Date    Afib ([place] [address] )     Anemia     Anxiety     Cancer ([place] [address] )     Cardiac disease     Cellulitis     COPD (chronic obstructive pulmonary disease) ([place] [address] )     Depression     Diabetes mellitus ([place] [address] )     DVT [facility]deep venous thrombosis) ([facility])     GERD (gastroesophageal reflux disease)     HBP (high blood pressure)     Heart failure ([place] [address] )     Hx of blood clots     Hypertension     Hypokalemia     Hypothyroid     Obesity     Psychiatric disorder      Past Surgical History:   Procedure Laterality Date    ABDOMINAL HERNIA REPAIR  05/2019    CHOLECYSTECTOMY      Lap    GASTRECTOMY SLEEVE LAPAROSCOPIC  08/2018    KNEE SURGERY Right     open wound, injury     LEG SURGERY Right 2009       Performed at least 2 patient identifiers during session: Name, Perla Kaplan and ID radha        05/10/21 9670   PT Last Visit   PT Visit Date 05/10/21   Note Type   Note type Evaluation   Pain Assessment   Pain Assessment Tool 0-10   Pain Score 5   Pain Location/Orientation Orientation: Bilateral;Location: Knee   Pain Onset/Description Frequency: Intermittent; Descriptor: Aching   Effect of Pain on Daily Activities Limits tolerance of mobility, limits rest and comfort   Patient's Stated Pain Goal No pain   Hospital Pain Intervention(s) Repositioned   Multiple Pain Sites No   Home Living   Type of Home SNF  ([facility])   Additional Comments Pt reports being a resident of [address] [place] [address] for "a couple years" (per chart appears to be aobut 7 years)  He reports having assistance with all ADLs and IADLs from facility staff   Participates in PT - reports that prior to his dx of COVID-19 in December 2020, he was completing in room functional mobility at [facility] level with RW, and ambulating 150ft with RW and PT  Recently pt reports he has been working on standing in the parallel bars, however has not yet progressed to ambulation  Prior Function   Level of [place] Needs assistance with ADLs and functional mobility; Needs assistance with IADLs   Lives With Facility staff   Receives Help From Personal care attendant  (PT services at SNF)   ADL Assistance Needs assistance   IADLs Needs assistance   Falls in the last 6 months   (unknown)   Restrictions/Precautions   Weight Bearing Precautions Per Order No   Other Precautions Telemetry; Bed Alarm;Pain; Fall Risk  (Morbid Obesity)   General   Additional Pertinent History Pt admitted under observation 5/10/2021 from [facility] with c/o SOB, cough and congestion  Family/Caregiver Present No   Cognition   Overall Cognitive Status WFL   Arousal/Participation Alert   Orientation Level Oriented X4   Memory Within functional limits   Following Commands Follows all commands and directions without difficulty   RUE Assessment   RUE Assessment WFL   LUE Assessment   LUE Assessment WFL   RLE Assessment   RLE Assessment X   Strength RLE   RLE Overall Strength 3+/5  (observed functionally in bed )   LLE Assessment   LLE Assessment X   Strength LLE   LLE Overall Strength 3+/5  (observed functionally in bed )   Coordination   Movements are Fluid and Coordinated 1   Sensation WFL   Light Touch   RLE Light Touch Grossly intact   LLE Light Touch Grossly intact   Bed Mobility   Rolling R 4  Minimal assistance   Additional items Assist x 1;Bedrails; Increased time required;Verbal cues   Rolling L 4  Minimal assistance   Additional items Assist x 1;Bedrails; Increased time required;Verbal cues   Supine to Sit 4  Minimal assistance  (TO/FROM LONG SIT IN BED)   Additional items Assist x 1;Bedrails   Sit to Supine 4  Minimal assistance  (TO/FROM LONG SIT IN BED)   Additional items Assist x 1;Bedrails   Additional Comments Pt able to self scoot towards [facility] with bed in trendelenburg, B UE use on bed rails, pushing down through hooklying knees  Transfers   Sit to Stand Unable to assess   Stand to Sit Unable to assess   Stand pivot Unable to assess   Additional Comments Transfers deferred on this date per pt request, agreeable to participate in / attempt transfer training next day  Ambulation/Elevation   Gait pattern Not tested   Endurance Deficit   Endurance Deficit Yes   Endurance Deficit Description + 3L supplemental O2 via NC donned  Activity Tolerance   Activity Tolerance Patient limited by fatigue;Patient limited by pain; Other (Comment)  (limited due to body habitus)   Nurse Made Aware Spoke with FREDIS Rg pre/post session   Assessment   Prognosis Fair   Problem List Decreased strength;Decreased endurance; Impaired balance;Decreased mobility;Obesity; Decreased skin integrity;Pain;Decreased range of motion   Assessment Pt seen for PT evaluation, cleared by FREDIS Rg  Pt admitted 5/10/2021 with c/o SOB, cough, congestion  Comorbidities affecting pt's fnxl performance include: Afib, anemia, anxiety, cancer, cardiac disease, COPD, depression, diabetes, DVT, hypertension, hypothyroid, obesity, GERD  Personal factors affecting pt at time of PT IE include: ambulating with assistive device/equipment, inability to ambulate in room distances, inability to perform ADLs/functional mobility w/o physical assist  PTA, pt was requiring assist for functional mobility within parallel bars, requiring assist for ADLS, requiring assist for IADLS, a resident of long term care / receiving PT services  Pt demonstrates fnxl impairments identified in objective findings from Elderly Mobility Scale assessment, scoring 2/20, indicating high degree of dependency on others for completion of ADLs & mobility   Pt scores 18/24 on Low Functioning AM-PAC objective tool w/ a standardized score of 29 25, indicating need for extensive rehab services upon d/c from the acute care setting  The Barthel Index outcome tool was used & pt scores 35 indicating severe limitations of fnxl mobility/ADLS  Pt is at risk of falls d/t multiple comorbidities, impaired balance, use of assistive device, varying levels of pain , acuity of medical illness, ongoing medical treatment of primary diagnosis and morbid obesity  Pt's clinical presentation is currently evolving seen in pt's presentation of changing level of pain, increased fall risk, new onset of impairment of functional mobility, decreased endurance and new onset of weakness  This PT IE requires moderate complexity clinical decision making, based on multiple medical/physiological/fnxl/social factors which affect pt's performance w/ evaluation & therapist judgement for disposition recommendations  Pt will benefit from continued PT treatment in order to address impairments, decrease risk of falls, maximize independence w/ fnxl mobility, & ensure safety w/ mobility for transition to next level of care  Based on pt presentation & impairments, pt would most appropriately benefit from return to facility with PT services upon d/c  Goals   Patient Goals "to walk like I was before " (pt referencing his ambulation before he had COVID-19 in Dec 2020  STG Expiration Date 05/20/21   Short Term Goal #1 Patient PT goals established in order to address patient self reported goal of "to walk like before"  1  Pt will complete all bed mobility in hospital bed with S, in order to promote increased OOB functional mobility & to facilitate self repositioning for pressure relief  2  Pt will complete all transfers with LRAD and MODA, in order to increase safety with functional mobility  3  Pt will ambulate 10ft with LRAD and MODA in order to increase safety with in room functional mobility   4  Pt will tolerate >4 minutes of functional static stand with B UE support and S, in order to facilitate LE strengthening and improved functional mobility  5  Pt will improve B LE strength to >/= 4-/5 MMT throughout, in order to increase safety with functional mobility and decrease risk of falls  6  Pt will demonstrate understanding and independence with LE strengthening HEP  7  Pt will improve Low Functioning AM-PAC score to >/= 24/24 in order to increase independence with mobility and decrease burden of care  8  Pt will improve Barthel Index score to >/= 45/100 in order to increase independence and decrease risk of falls  9  Pt will improve Elderly Mobility Scale score to >/= 6/20 in order to decrease burden of care and increase independence with functional mobility and ADLs  10  Pt will improve dynamic sitting balance to >/= Fair+ grade in order to promote safety and increased independence with mobility  11  Pt will tolerate >3hrs OOB in upright position, in order to improve muscular endurance and respiratory status  PT Treatment Day 0   Plan   Treatment/Interventions Functional transfer training;LE strengthening/ROM; Therapeutic exercise; Endurance training;Equipment eval/education; Bed mobility;Gait training; Compensatory technique education;Spoke to nursing;OT   PT Frequency Other (Comment)  (3-5x/wk )   Recommendation   PT Discharge Recommendation Return to facility with rehabilitation services   Equipment Recommended   (Equipment needs TBD pending progress)   Additional Comments Pt anticipated to require Bariatric Hospital bed with overhead trapeze in order to facilitate pt self repositioning in bed for comfort and pressure relief     AM-PAC Basic Mobility Inpatient   Turning in Bed Without Bedrails 2   Lying on Back to Sitting on Edge of Flat Bed 2   Moving Bed to Chair 2   Standing Up From Chair 2   Walk in Room 1   Climb 3-5 Stairs 1   Basic Mobility Inpatient Raw Score 10   Turning Head Towards Sound 4   Follow Simple Instructions 4   Low Function Basic Mobility Raw Score 18   Low Function Basic Mobility Standardized Score 29 25   Modified [place] Scale   Modified [place] Scale 4   Barthel Index   Feeding 10   Bathing 0   Grooming Score 0   Dressing Score 5   Bladder Score 5   Bowels Score 10   Toilet Use Score 0   Transfers (Bed/Chair) Score 5   Mobility (Level Surface) Score 0   Stairs Score 0   Barthel Index Score 35       The patient's AM-PAC Basic Mobility Inpatient Short Form Low Function Raw Score 18 , Standardized Score is 29 25  A standardized score less 42 9 suggests the patient may benefit from discharge to post-acute rehab services  Please also refer to the recommendation of the Physical Therapist for safe discharge planning  ELDERLY MOBILITY SCALE OUTCOME MEASURE:   Older Adult & Geriatric Care: ([facility]; n=36; age= 70-93)  Level of independence and EMS scores:   Score > 14 = independent in basic ADLs and safe for independent mobility maneuvers (with or without device)   Score 10-13 = borderline in terms of safe mobility and independence in ADLs (require some help with ADLs and mobility maneuvers)    Score < 10 = dependent or high degree of dependency for basic ADLs and limited mobility maneuvers (such as transfers, toileting, dressing)  Discharge recommendations and EMS scores:   Score 0-6 = post acute STR / SNF / IRF   Score 7-13 = home with high levels of care - ie: skilled caretaker, community resources, family asssistance   Score 14-20 = home   Please refer to therapist full assessment & documentation for most appropriate recommendation and details for discharge         Rose Richmond, PT, DPT   Available via [email or web address]  NPI # [id number]  [place] License - [id number]  [place] License - [id number]  5/10/2021

## 2021-05-11 ENCOUNTER — APPOINTMENT (OUTPATIENT)
Dept: NON INVASIVE DIAGNOSTICS | Facility: HOSPITAL | Age: 51
DRG: 194 | End: 2021-05-11
Payer: COMMERCIAL

## 2021-05-11 PROBLEM — R94.31 QT PROLONGATION: Status: ACTIVE | Noted: 2021-05-11

## 2021-05-11 LAB
ANION GAP SERPL CALCULATED.3IONS-SCNC: 3 MMOL/L (ref 4–13)
BASE EX.OXY STD BLDV CALC-SCNC: 89.8 % (ref 60–80)
BASE EXCESS BLDV CALC-SCNC: 10.6 MMOL/L
BASOPHILS # BLD AUTO: 0.02 THOUSANDS/ΜL (ref 0–0.1)
BASOPHILS NFR BLD AUTO: 0 % (ref 0–1)
BUN SERPL-MCNC: 15 MG/DL (ref 6–20)
CALCIUM SERPL-MCNC: 8.9 MG/DL (ref 8.4–10.2)
CHLORIDE SERPL-SCNC: 95 MMOL/L (ref 96–108)
CO2 SERPL-SCNC: 43 MMOL/L (ref 22–33)
CREAT SERPL-MCNC: 0.79 MG/DL (ref 0.5–1.2)
EOSINOPHIL # BLD AUTO: 0.11 THOUSAND/ΜL (ref 0–0.61)
EOSINOPHIL NFR BLD AUTO: 2 % (ref 0–6)
ERYTHROCYTE [DISTWIDTH] IN BLOOD BY AUTOMATED COUNT: 13 % (ref 11.6–15.1)
GFR SERPL CREATININE-BSD FRML MDRD: 105 ML/MIN/1.73SQ M
GLUCOSE SERPL-MCNC: 104 MG/DL (ref 65–140)
GLUCOSE SERPL-MCNC: 108 MG/DL (ref 65–140)
HCO3 BLDV-SCNC: 37.9 MMOL/L (ref 24–30)
HCT VFR BLD AUTO: 39.9 % (ref 36.5–49.3)
HGB BLD-MCNC: 12.7 G/DL (ref 12–17)
IMM GRANULOCYTES # BLD AUTO: 0.01 THOUSAND/UL (ref 0–0.2)
IMM GRANULOCYTES NFR BLD AUTO: 0 % (ref 0–2)
LYMPHOCYTES # BLD AUTO: 1.2 THOUSANDS/ΜL (ref 0.6–4.47)
LYMPHOCYTES NFR BLD AUTO: 22 % (ref 14–44)
MAGNESIUM SERPL-MCNC: 1.9 MG/DL (ref 1.6–2.6)
MCH RBC QN AUTO: 30.2 PG (ref 26.8–34.3)
MCHC RBC AUTO-ENTMCNC: 31.8 G/DL (ref 31.4–37.4)
MCV RBC AUTO: 95 FL (ref 82–98)
MONOCYTES # BLD AUTO: 0.39 THOUSAND/ΜL (ref 0.17–1.22)
MONOCYTES NFR BLD AUTO: 7 % (ref 4–12)
NEUTROPHILS # BLD AUTO: 3.66 THOUSANDS/ΜL (ref 1.85–7.62)
NEUTS SEG NFR BLD AUTO: 69 % (ref 43–75)
O2 CT BLDV-SCNC: 16.7 ML/DL
PCO2 BLDV: 63.3 MM HG (ref 42–50)
PH BLDV: 7.39 [PH] (ref 7.3–7.4)
PLATELET # BLD AUTO: 168 THOUSANDS/UL (ref 149–390)
PMV BLD AUTO: 10.2 FL (ref 8.9–12.7)
PO2 BLDV: 64.9 MM HG (ref 35–45)
POTASSIUM SERPL-SCNC: 3.8 MMOL/L (ref 3.5–5)
RBC # BLD AUTO: 4.2 MILLION/UL (ref 3.88–5.62)
SODIUM SERPL-SCNC: 141 MMOL/L (ref 133–145)
WBC # BLD AUTO: 5.39 THOUSAND/UL (ref 4.31–10.16)

## 2021-05-11 PROCEDURE — 87081 CULTURE SCREEN ONLY: CPT | Performed by: INTERNAL MEDICINE

## 2021-05-11 PROCEDURE — 94002 VENT MGMT INPAT INIT DAY: CPT

## 2021-05-11 PROCEDURE — 82805 BLOOD GASES W/O2 SATURATION: CPT | Performed by: INTERNAL MEDICINE

## 2021-05-11 PROCEDURE — 99254 IP/OBS CNSLTJ NEW/EST MOD 60: CPT | Performed by: INTERNAL MEDICINE

## 2021-05-11 PROCEDURE — 80048 BASIC METABOLIC PNL TOTAL CA: CPT | Performed by: INTERNAL MEDICINE

## 2021-05-11 PROCEDURE — 93306 TTE W/DOPPLER COMPLETE: CPT

## 2021-05-11 PROCEDURE — 99233 SBSQ HOSP IP/OBS HIGH 50: CPT | Performed by: INTERNAL MEDICINE

## 2021-05-11 PROCEDURE — 83735 ASSAY OF MAGNESIUM: CPT | Performed by: INTERNAL MEDICINE

## 2021-05-11 PROCEDURE — 82948 REAGENT STRIP/BLOOD GLUCOSE: CPT

## 2021-05-11 PROCEDURE — 94664 DEMO&/EVAL PT USE INHALER: CPT

## 2021-05-11 PROCEDURE — 93306 TTE W/DOPPLER COMPLETE: CPT | Performed by: INTERNAL MEDICINE

## 2021-05-11 PROCEDURE — 94760 N-INVAS EAR/PLS OXIMETRY 1: CPT

## 2021-05-11 PROCEDURE — 94668 MNPJ CHEST WALL SBSQ: CPT

## 2021-05-11 PROCEDURE — 93005 ELECTROCARDIOGRAM TRACING: CPT

## 2021-05-11 PROCEDURE — 97530 THERAPEUTIC ACTIVITIES: CPT

## 2021-05-11 PROCEDURE — 94640 AIRWAY INHALATION TREATMENT: CPT

## 2021-05-11 PROCEDURE — 85025 COMPLETE CBC W/AUTO DIFF WBC: CPT | Performed by: INTERNAL MEDICINE

## 2021-05-11 RX ORDER — FUROSEMIDE 10 MG/ML
40 INJECTION INTRAMUSCULAR; INTRAVENOUS
Status: DISCONTINUED | OUTPATIENT
Start: 2021-05-11 | End: 2021-05-12

## 2021-05-11 RX ORDER — FUROSEMIDE 10 MG/ML
20 INJECTION INTRAMUSCULAR; INTRAVENOUS
Status: DISCONTINUED | OUTPATIENT
Start: 2021-05-11 | End: 2021-05-11

## 2021-05-11 RX ORDER — POTASSIUM CHLORIDE 20 MEQ/1
20 TABLET, EXTENDED RELEASE ORAL 2 TIMES DAILY
Status: DISCONTINUED | OUTPATIENT
Start: 2021-05-12 | End: 2021-05-16 | Stop reason: HOSPADM

## 2021-05-11 RX ORDER — POTASSIUM CHLORIDE 20 MEQ/1
40 TABLET, EXTENDED RELEASE ORAL ONCE
Status: COMPLETED | OUTPATIENT
Start: 2021-05-11 | End: 2021-05-11

## 2021-05-11 RX ORDER — FUROSEMIDE 10 MG/ML
20 INJECTION INTRAMUSCULAR; INTRAVENOUS ONCE
Status: DISCONTINUED | OUTPATIENT
Start: 2021-05-11 | End: 2021-05-11

## 2021-05-11 RX ADMIN — ACETAMINOPHEN 650 MG: 325 TABLET, FILM COATED ORAL at 08:39

## 2021-05-11 RX ADMIN — HYDROXYZINE HYDROCHLORIDE 25 MG: 25 TABLET ORAL at 06:20

## 2021-05-11 RX ADMIN — Medication 5000 UNITS: at 08:32

## 2021-05-11 RX ADMIN — WHITE PETROLATUM 57.7 %-MINERAL OIL 31.9 % EYE OINTMENT: at 21:49

## 2021-05-11 RX ADMIN — BUDESONIDE AND FORMOTEROL FUMARATE DIHYDRATE 2 PUFF: 160; 4.5 AEROSOL RESPIRATORY (INHALATION) at 07:59

## 2021-05-11 RX ADMIN — HYDROXYZINE HYDROCHLORIDE 25 MG: 25 TABLET ORAL at 21:44

## 2021-05-11 RX ADMIN — BUDESONIDE AND FORMOTEROL FUMARATE DIHYDRATE 2 PUFF: 160; 4.5 AEROSOL RESPIRATORY (INHALATION) at 20:14

## 2021-05-11 RX ADMIN — Medication: at 15:41

## 2021-05-11 RX ADMIN — ALBUTEROL SULFATE 2.5 MG: 2.5 SOLUTION RESPIRATORY (INHALATION) at 07:42

## 2021-05-11 RX ADMIN — BUPROPION HYDROCHLORIDE 300 MG: 150 TABLET, EXTENDED RELEASE ORAL at 08:33

## 2021-05-11 RX ADMIN — DOCUSATE SODIUM 100 MG: 100 CAPSULE ORAL at 08:32

## 2021-05-11 RX ADMIN — FUROSEMIDE 40 MG: 10 INJECTION, SOLUTION INTRAMUSCULAR; INTRAVENOUS at 18:56

## 2021-05-11 RX ADMIN — DABIGATRAN ETEXILATE MESYLATE 150 MG: 150 CAPSULE ORAL at 08:33

## 2021-05-11 RX ADMIN — NYSTATIN: 100000 POWDER TOPICAL at 15:42

## 2021-05-11 RX ADMIN — NYSTATIN: 100000 POWDER TOPICAL at 21:48

## 2021-05-11 RX ADMIN — METOPROLOL TARTRATE 25 MG: 25 TABLET, FILM COATED ORAL at 08:33

## 2021-05-11 RX ADMIN — POTASSIUM CHLORIDE 20 MEQ: 1500 TABLET, EXTENDED RELEASE ORAL at 08:33

## 2021-05-11 RX ADMIN — NYSTATIN: 100000 POWDER TOPICAL at 06:22

## 2021-05-11 RX ADMIN — FUROSEMIDE 40 MG: 10 INJECTION, SOLUTION INTRAMUSCULAR; INTRAVENOUS at 11:41

## 2021-05-11 RX ADMIN — TRAZODONE HYDROCHLORIDE 150 MG: 100 TABLET ORAL at 21:44

## 2021-05-11 RX ADMIN — DABIGATRAN ETEXILATE MESYLATE 150 MG: 150 CAPSULE ORAL at 21:45

## 2021-05-11 RX ADMIN — METHOCARBAMOL TABLETS 750 MG: 500 TABLET, COATED ORAL at 15:44

## 2021-05-11 RX ADMIN — METOPROLOL TARTRATE 25 MG: 25 TABLET, FILM COATED ORAL at 21:44

## 2021-05-11 RX ADMIN — SPIRONOLACTONE 50 MG: 25 TABLET, FILM COATED ORAL at 08:33

## 2021-05-11 RX ADMIN — DOCUSATE SODIUM 100 MG: 100 CAPSULE ORAL at 18:56

## 2021-05-11 RX ADMIN — METHOCARBAMOL TABLETS 750 MG: 500 TABLET, COATED ORAL at 21:45

## 2021-05-11 RX ADMIN — STANDARDIZED SENNA CONCENTRATE 8.6 MG: 8.6 TABLET ORAL at 21:44

## 2021-05-11 RX ADMIN — POTASSIUM CHLORIDE 40 MEQ: 1500 TABLET, EXTENDED RELEASE ORAL at 15:42

## 2021-05-11 RX ADMIN — Medication 1 TABLET: at 08:33

## 2021-05-11 RX ADMIN — FLUTICASONE PROPIONATE 1 SPRAY: 50 SPRAY, METERED NASAL at 21:49

## 2021-05-11 RX ADMIN — FLUTICASONE PROPIONATE 1 SPRAY: 50 SPRAY, METERED NASAL at 08:32

## 2021-05-11 RX ADMIN — METHOCARBAMOL TABLETS 750 MG: 500 TABLET, COATED ORAL at 06:20

## 2021-05-11 RX ADMIN — HYDROXYZINE HYDROCHLORIDE 25 MG: 25 TABLET ORAL at 15:44

## 2021-05-11 RX ADMIN — DIGOXIN 125 MCG: 125 TABLET ORAL at 08:40

## 2021-05-11 NOTE — NURSING NOTE
Patient transferred to new bed in order to weigh patient   Patient overloaded bed  Dr Philip Nguyen and bed ordered  Ruthy gentile texted for further instruction on how to obtain bed

## 2021-05-11 NOTE — ASSESSMENT & PLAN NOTE
· Patient is a known case of atrial fibrillation, on anticoagulation with pradaxa   · Is also on digoxin 0 125 mg PO daily, and metoprolol tartrate 25 mg PO q12h   · Patient presented with SOB, chest discomfort but no hypoxia   · HR on admission - 109, had intermittent tachycardic episodes in the 110-120s   · Digoxin level - 0 8   · S/p 12 5 mg PO Lopressor x 1 dose at the time of admission due to soft Bps ( SBP - 100s )   · Currently HR is controlled in the 80-90s     PLAN :   · Continue Telemetry monitoring   · Revert back to home dose of metoprolol 25 mg PO q12h with holding parameters   · Continue digoxin at home dose   · Continue order lopressor IV PRN for HR > 120, with holding parameters   · Keep magnesium > 2, potassium > 4   · Continue pradaxa at home dose for anticoagulation

## 2021-05-11 NOTE — NURSING NOTE
Unable to obtain daily weight  Bed scale not working and patient is not ambulatory, so I am unable to get standing scale weight

## 2021-05-11 NOTE — NURSING NOTE
Bariatric bed checked by Biomed  Patient moved to bed with assistance from day and night shift nurses  Patient and staff educated about bed and staff alerted to info in charge book

## 2021-05-11 NOTE — ASSESSMENT & PLAN NOTE
· Known case of COPD, former smoker, quit about 6 years ago, used to smoke 1 ppd x 20 years   · Is on symbicort at the nursing home   · Reports increased cough with increase  In amount of whitish sputum production, no change in color / consistency   · No wheeze heard in physical exam, however diminished breath sound and difficult to appreciate due to body habitus     · Patient has no mental status abnormalities, is alert and oriented x 3     PLAN :  · Continue symbicort while inpatient   · Continue albuterol nebs PRN

## 2021-05-11 NOTE — UTILIZATION REVIEW
Initial Clinical Review    Admission: Date/Time/Statement: 5/10/21 AT 1242 OBSERVATION - CONVERTED TO INPATIENT 5/11/21 AT 1817 2ND ACUTE ON CHRONIC CHF REQUIRING CONTINUED INPATIENT MANAGEMENT WITH IV LASIX Q8HRS PER CARDIOLOGY - AFTER > 29 HOURS OBSERVATION TREATMENT    05/11/21 1818  Inpatient Admission Once     Question Answer Comment   Level of Care Med Surg    Estimated length of stay More than 2 Midnights    Certification I certify that inpatient services are medically necessary for this patient for a duration of greater than two midnights  See H&P and MD Progress Notes for additional information about the patient's course of treatment  05/11/21 1817     ED Arrival Information     Expected Arrival Acuity Means of Arrival Escorted By Service Admission Type    - 5/10/2021 09:27 Urgent Ambulance [facility] General Medicine Urgent    Arrival Complaint    SOB, cough     Chief Complaint   Patient presents with    Shortness of Breath     Patient presents with c/o SOB, cough and congestion  Symptoms started the last few days  Initial Presentation:   50 y/o male with a PMHx morbid obesity s/p gastric sleeve surgery in 2018,  HTN,  COPD/ FILIPPO/  hypoventilation syndrome, chronic hypoxic hypercapneic respiratory failure - on 2 L oxygen, atrial fibrillation on Pradaxa, right sided CHF, chronic lower extremity edema, DVT/ PE, GERD,  anxiety/ depression; recent COVID Infx Dec 2020  Annia Luque Presented via EMS to [facility] ED from [facility] 2nd few day history of  SOB that worsened this morning and felt like "a heavy weight on his chest", making it difficult for him to breathe  Able to breathe better sitting up  and reports episodes in the past few days where he woke up in the middle of the night with SOB (PND)  He denies any recent increase in the number of pillows used, but does report that he "feels fluid in his body/ lungs"   As per nursing staff he refuses to be weighed daily but appears he has gained weight  He is on chronic 2 L oxygen supplementation, with no increase in requirement  He has SOB, chronic cough productive of increased whitish sputum, also reports he is "urinating less" with unchanged dose of lasix  In the ED  - HR  99   RR 18    / 41 SpO2 99 % on 2 L O2  Labs: COVID negative  CBC +   CMP - CO2  42    1  Troponin - < 0 03  CT Chest showed diffuse ground glass opacities which could represent pulmonary edema / infectious / inflammatory process and 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 (4 9 cm in 2017)  Chest  X Ray - cardiomegaly, with no obvious focal consolidation     *Placed in Observation 5/10/21 at 1242 and converted to Inpatient 5/11/21 at 1817 2nd Shortness of Breath 2nd Acute on Chronic Diastolic CHF - requires continued Inpatient management with IV Lasix q8hrs per Cardiology after > 29 hours Observation Treatament     Date: 5/11/21   Day 2 - Cardiology:  Assessment:     Active Problems:   Shortness of Breath 2nd Acute on Chronic Diastolic CHF  Compliant with furosemide, but reports from the nursing home indicate only 20 mg p o  B i d , despite his dose per last office visit at 40 mg po  bid  He has responded to 20 mg IV x1  However at his current weight, and he suspects at least 20 lb of weight gain, I think he is going to need an extensive and aggressive diuretic challenge      Plan:      Would give furosemide 40 mg IV tid for the next 48 hours and follow renal function and electrolytes as well as acid-base balance closely  If his bicarbonate rises above 44, I would replace 1 of the doses of furosemide with 500 mg IV Diamox    Fluid restriction - Sodium restriction  Would definitely insure he is discharged when he is ready with a strict low-sodium diet    ED Triage Vitals   Temperature Pulse Respirations Blood Pressure SpO2   05/10/21 0926 05/10/21 0926 05/10/21 0926 05/10/21 0926 05/10/21 0926   97 8 °F (36 6 °C) 99 19 (!) 121/41 99 %      Temp Source Heart Rate Source Patient Position - Orthostatic VS BP Location FiO2 (%)   05/10/21 0926 05/10/21 0926 05/10/21 0926 05/10/21 0926 05/11/21 0544   Oral Monitor Sitting Left arm 35      Pain Score       05/10/21 0926       5          Wt Readings from Last 1 Encounters:   05/10/21 (!) 186 kg (410 lb)     Additional Vital Signs:   5/11/21 0728  96 5 °F (35 8 °C)Abnormal   90  18  121/53  76  96 %  --  --  2 L  --  NC  Lying   05/11/21 0544  --  --  --  --  --  96 %  35  --  --  BiPAP  Face mask  --   05/11/21 0131  --  --  --  --  --  97 %  --  --  --  BiPAP  Face mask  --   05/11/21 0004  98 1 °F (36 7 °C)  100  18  123/52  77  95 %  --  --  --  BiPAP  Face Mask  Lying   05/10/21 2303  --  83  --  --  --  98 %  --  --  --  BiPAP  Face mask  --   05/10/21 2009  97 8 °F (36 6 °C)  114Abnormal   18  107/70  --  94 %  --  --  --  Nasal cannula  --  --   05/10/21 1948  --  --  --  120/58  --  97 %  --  32  3 L/min  Nasal cannula  --  --   05/10/21 1417  98 6 °F (37 °C)  110Abnormal   20  107/73  --  96 %  --  32  3 L/min  Nasal cannula  --  Lying   05/10/21 1307  --  112Abnormal   22  123/64  --  97 %  --  32  3 L/min  Nasal cannula  --  Lying   05/10/21 1121  --  102  20  117/64  --  95 %  --  28  2 L/min  Nasal cannula  --  Lying     Blood Pressure  121/53 filed at 05/11/2021 9069   Patient Position - Orthostatic VS  Lying filed at 05/96/4488 7633      Systolic (16QEK), WVA:572 , Min:107 , REE:373    Diastolic (03KVP), CKZ:59, Min:52, Max:73     I/O 05/10 0701   05/11 0700 05/11 0701      P  O   500   Total Intake(mL/kg)  500 (2 7)   Urine (mL/kg/hr) 1375 600 (2 4)   Stool 0    Total Output 1375 600   Net -1375 -100        Unmeasured Stool Occurrence 1 x      Date/Time  Weight  Weight Method  Height   05/10/21 1417  186 kg (410 lb)Abnormal   Stated  5' 10" (1 778 m)   05/10/21 0926  186 kg (410 lb)Abnormal   Stated  5' 11" (1 803 m)     Pertinent Labs/Diagnostic Test Results:   Results from last 7 days   Lab Units 05/10/21  0950   SARS-COV-2  Negative     Results from last 7 days   Lab Units 05/11/21  0653 05/10/21  0950   WBC Thousand/uL 5 39 8 10   HEMOGLOBIN g/dL 12 7 14 2   HEMATOCRIT % 39 9 44 3   PLATELETS Thousands/uL 168 198   NEUTROS ABS Thousands/µL 3 66 5 93     Results from last 7 days   Lab Units 05/11/21  0653 05/10/21  0950   SODIUM mmol/L 141 141   POTASSIUM mmol/L 3 8 4 3   CHLORIDE mmol/L 95* 94*   CO2 mmol/L 43* 42*   ANION GAP mmol/L 3* 5   BUN mg/dL 15 18   CREATININE mg/dL 0 79 0 84   EGFR ml/min/1 73sq m 105 102   CALCIUM mg/dL 8 9 9 3   MAGNESIUM mg/dL 1 9 2 0     Results from last 7 days   Lab Units 05/10/21  0950   AST U/L 11*   ALT U/L 10   ALK PHOS U/L 75 5   TOTAL PROTEIN g/dL 7 6   ALBUMIN g/dL 4 1   TOTAL BILIRUBIN mg/dL 0 78       Results from last 7 days   Lab Units 05/11/21  0653 05/10/21  0950   GLUCOSE RANDOM mg/dL 104 99     Results from last 7 days   Lab Units 05/10/21  1655 05/10/21  0950   TROPONIN I ng/mL <0 03 <0 03     Results from last 7 days   Lab Units 05/10/21  0950   TSH 3RD GENERATON uIU/mL 3 222     Results from last 7 days   Lab Units 05/10/21  1655   DIGOXIN LVL ng/mL 0 8     Results from last 7 days   Lab Units 05/10/21  0950   BNP pg/mL 125 1*     12 LEAD EKG (Per MD):  Atrial fib, with no acute ST-T changes, prolonged QTc - 537 ms     CTA - CHEST WITH IV CONTRAST - PULMONARY ANGIOGRAM   1  No pulmonary emboli within study limitations  2   Pulmonary artery aneurysm, now measuring 5 4 cm, previously 4 9 cm   3   Diffuse groundglass, which may represent pulmonary edema  or infectious/inflammatory process   No focal consolidation       ED Treatment:   Medication Administration from 05/10/2021 0924 to 05/10/2021 1406       Date/Time Order Dose Route Action     05/10/2021 1207 iohexol (OMNIPAQUE) 350 MG/ML injection (SINGLE-DOSE) 100 mL 100 mL Intravenous Given     Past Medical History:   Diagnosis Date    Afib (HCC)     Anemia     Anxiety     Cancer [facility])     Cardiac disease     Cellulitis     COPD [facility]chronic obstructive pulmonary disease) ([facility] )     Depression     Diabetes mellitus ([facility] )     DVT [facility]deep venous thrombosis) ([facility])     GERD (gastroesophageal reflux disease)     HBP (high blood pressure)     Heart failure ([facility])     Hx of blood clots     Hypertension     Hypokalemia     Hypothyroid     Obesity     Psychiatric disorder      Present on Admission:   Morbid obesity ([facility] )   Obesity hypoventilation syndrome ([place] [address] )   DVT (deep venous thrombosis) ([facility])   Acute on chronic diastolic congestive heart failure ([facility])    Admitting Diagnosis: Shortness of breath [R06 02]  SOB (shortness of breath) [R06 02]  Obesity [E66 9]  COPD exacerbation ([facility]) [J44 1]  Chest pain, unspecified type [R07 9]    Age/Sex: 49 y o  male    Admission Orders:  Telemetry  VS q4hrs  Nasal O2 at 2 L/min - Titrate SpO2 > 92 %  BT-  BiPAP 16/5 with 35 %   Continuous Pulse Oximetry  Up + OOB as tolerated  Diet Cardiovascular; Cardiac; Fluid Restriction 1500 ML   I+O q shift  Daily weight  Serial Troponin q3hrs x 3  PT + OT Evals    Scheduled Medications:  artificial tear, , Both Eyes, HS  budesonide-formoterol, 2 puff, Inhalation, BID  buPROPion, 300 mg, Oral, Daily  calcium carbonate-vitamin D, 1 tablet, Oral, Daily With Breakfast  cholecalciferol, 5,000 Units, Oral, Daily  dabigatran etexilate, 150 mg, Oral, Q12H [address]  digoxin, 125 mcg, Oral, Daily  docusate sodium, 100 mg, Oral, BID  IV furosemide (LASIX) injection 40 mg, Intravenous TID - START 5/11/21  fluticasone, 1 spray, Nasal, BID  hydrOXYzine HCL, 25 mg, Oral, Q8H MARY  methocarbamol, 750 mg, Oral, Q8H MARY  metoprolol tartrate, 25 mg, Oral, Q12H MARY  nystatin, , Topical, BID  potassium chloride, 20 mEq, Oral, BID  senna, 1 tablet, Oral, HS  spironolactone, 50 mg, Oral, Daily  traZODone, 150 mg, Oral, HS    PRN Meds:  acetaminophen, 650 mg, Oral, Q6H PRN GIVEN X 1  albuterol, 2 5 mg, Nebulization, Q6H PRN GIVEN X 2  ammonium lactate, , Topical, BID PRN  metoprolol, 2 5 mg, Intravenous, Q6H PRN    IP CONSULT TO CARDIOLOGY    Network Utilization Review Department  ATTENTION: Please call with any questions or concerns to [phone] and carefully listen to the prompts so that you are directed to the right person  All voicemails are confidential   Darling Hernandez all requests for admission clinical reviews, approved or denied determinations and any other requests to dedicated fax number below belonging to the campus where the patient is receiving treatment   List of dedicated fax numbers for the Facilities:  [address] DENIALS (Administrative/Medical Necessity) [phone]   [address] (Maternity/NICU/Pediatrics) [phone]   [address] Charles Samson 9915 [address] [place] [address] [address] [facility] [place] [address] [phone]

## 2021-05-11 NOTE — ASSESSMENT & PLAN NOTE
· Patient presented with SOB, started few days ago worsened on the morning of admission   · Associated symptoms : chest heaviness, orthopnea, PND, decreased urination with the current dose of lasix, increase in cough and sputum production, palpitations, subjective feeling of "fluid in his body / chest"   · No fever, chills, URTI symptoms, calf swelling / pain above baseline, no wheezing, change in color / consistency / character of sputum   · Vitals : saturating at 99% on baseline oxygen of 2 L, intermittent episodes of tachycardia  Monitor shows Afib with RVR - HR - sometimes increasing to 120s   · Labs : normal troponin, mildly elevated BNP - 125 ( patient is morbidly obese), elevated bicarbonate, normal renal function, no leukocytosis   · Chest CTA : ruled out PE, reported diffuse groundglass opacities -- could represent pulmonary edema or infectious / inflammatory process  · Etiology of SOB likely CHF exacerbation, in the setting of tachyarrhythmia  · Patient received 20 mg IV lasix dose on the day of admission due to soft BP - had a urine output of 850 ml within 1 5 hours  · BP improved - currently 121/53 mmHg    · Patient is supposed to be on a 40 mg PO BID dose at the nursing facility, however is only on 20 mg PO BID as per nursing home medication records     PLAN :   · See further management under "acute on chronic diastolic CHF"   · Continue oxygen supplementation via nasal cannula to maintain sats of 88-92%   · Continue home inhaler - Symbicort, albuterol nebs PRN

## 2021-05-11 NOTE — ASSESSMENT & PLAN NOTE
· Patient presented with a prolonged QTc of 537 ms  · On telemetry monitoring   · Repeat EKG today shows a QTc of 569 ms   · Serum potassium is 3 8, magnesium is 1 9    PLAN :   · Avoid QTc prolonging agents   · Continue telemetry monitoring   · Will order 40 MEq PO KCl to maintain serum potassium > 4

## 2021-05-11 NOTE — ASSESSMENT & PLAN NOTE
· Patient has a history of hypothyroidism, was on levothyroxine 25 mcg PO daily   · This medication is not his list of medications that he takes at the nursing home   · Last TSH in August 2020 - 1 75   · TSH is normal at 3 2 on 05/10    PLAN :   · At the nursing home, patient is not on levothyroxine-- will continue to hold off

## 2021-05-11 NOTE — ASSESSMENT & PLAN NOTE
· S/p gastric sleeve surgery in 2018   · Patient used to weigh approximately 650 pounds, lost about 250-260 pounds and weight has been stable since  · As per nursing home staff, patient refuses to be weighed regularly but they do estimate a significant weight gain     · Patient reported to cardiology that he at least gained about 20 lbs   · Patient follows with bariatric surgery - last seen on 04/21     PLAN :   · Continue outpatient management and follow up with bariatric surgery

## 2021-05-11 NOTE — CONSULTS
Consultation - Cardiology   Lenin Giraldo 49 y o  male MRN: [id number]  Unit/Bed#: -01 Encounter: [id number]        Assessment:   Active Problems:    Shortness of breath    Morbid obesity (HCC)    Obesity hypoventilation syndrome (HCC)    Acute on chronic diastolic congestive heart failure (HCC)    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HCC)    DVT (deep venous thrombosis) (HCC)    Atrial fibrillation (HCC)    COPD (chronic obstructive pulmonary disease) (HCC)    Hypothyroidism      acute on chronic diastolic CHF  Dietary indiscretion with frequent beltran intake  Avoids soup at his nursing home  Compliant with furosemide, but reports from the nursing home indicate only 20 mg p o  B i d , despite his dose per last office visit at 40 mg p o  B i d  He has responded to 20 mg IV x1  However at his current weight, and he suspects at least 20 lb of weight gain, I think he is going to need an extensive and aggressive diuretic challenge  Plan:     Discussed with the primary team  Would give furosemide 40 mg IV t i d  For the next 48 hours and follow renal function and electrolytes as well as acid-base balance closely  If his bicarbonate rises above 44, I would replace 1 of the doses of furosemide with 500 mg IV Diamox  Fluid restriction  Sodium restriction  Would definitely insure he is discharged when he is ready with a strict low-sodium diet  He will need 1 week follow-up in our office  This will be arranged as he nears discharge    History of Present Illness   Physician Requesting Consult: Norman Schroeder MD  Reason for Consult / Principal Problem:  Shortness of breath  HPI: Lenin Giraldo is a 49y o  year old male who presents with morbid obesity, obesity hypoventilation, diastolic CHF, who contracted COVID a few months ago, and in the last few weeks has been eating excessive sodium containing food, he mentions beltran at least daily, who is only on furosemide 20 mg p o  B i d   Despite his office recommendation from last June at 40 mg p o  B i d  He feels he has probably gained about 20 lb  He had a CTA done because of his sedentary status and history of PE, but negative for PE  He had an echo done which was personally reviewed, EF is probably 55%, he does have diastolic dysfunction  His RV is mildly thickened likely due to his obesity hypoventilation syndrome, but function is normal   He has responded well to furosemide 20 mg IV x1, and renal function is normal but he does have a chronically elevated bicarbonate due to renally compensated respiratory acidosis  He has chronic AFib, but AFib with RVR on presentation, likely driven by his volume overload  Atypical chest pain is likely due to his volume retention  Troponins are negative  Review of Systems   Constitutional: Positive for appetite change  HENT: Negative  Eyes: Negative  Respiratory: Positive for chest tightness and shortness of breath  Cardiovascular: Positive for chest pain, palpitations and leg swelling  Gastrointestinal: Positive for abdominal distention  Negative for anal bleeding, blood in stool and constipation  Endocrine: Negative  Genitourinary: Negative  Musculoskeletal: Negative  Skin: Negative  Neurological: Negative  Hematological: Negative  Psychiatric/Behavioral: Negative          Historical Information   Past Medical History:   Diagnosis Date    Afib ([facility] )     Anemia     Anxiety     Cancer ([facility] )     Cardiac disease     Cellulitis     COPD (chronic obstructive pulmonary disease) ([facility] )     Depression     Diabetes mellitus ([facility] )     DVT (deep venous thrombosis) (HCC)     GERD (gastroesophageal reflux disease)     HBP (high blood pressure)     Heart failure (HCC)     Hx of blood clots     Hypertension     Hypokalemia     Hypothyroid     Obesity     Psychiatric disorder      Past Surgical History:   Procedure Laterality Date    ABDOMINAL HERNIA REPAIR  05/2019    CHOLECYSTECTOMY      Lap    GASTRECTOMY SLEEVE LAPAROSCOPIC  08/2018    KNEE SURGERY Right     open wound, injury     LEG SURGERY Right 2009     Social History     Substance and Sexual Activity   Alcohol Use Not Currently     Social History     Substance and Sexual Activity   Drug Use No     Social History     Tobacco Use   Smoking Status Former Smoker    Packs/day: 1 00    Years: 15 00    Pack years: 15 00   Smokeless Tobacco Never Used   Tobacco Comment    1 5ppd x 23 years, quit 2014     Family History   Problem Relation Age of Onset    Lung cancer Father     Diabetes Maternal Aunt     Heart attack Mother     Clotting disorder Mother     Hypertension Mother     Heart failure Mother     Diabetes Mother     Atrial fibrillation Mother     Sleep apnea Mother     Obesity Mother     Asthma Sister     Stroke Neg Hx     Anuerysm Neg Hx     Arrhythmia Neg Hx     Hyperlipidemia Neg Hx         pt unsure    Fainting Neg Hx        Allergies:   Allergies   Allergen Reactions    Penicillins Hives       Medications:   Scheduled Meds:  Current Facility-Administered Medications   Medication Dose Route Frequency Provider Last Rate    acetaminophen  650 mg Oral Q6H PRN Kenn Bedoya MD      albuterol  2 5 mg Nebulization Q6H PRN Kenn Bedoya MD      ammonium lactate   Topical BID PRN Francisco Morin MD      artificial tear   Both Eyes HS Kenn Bedoya MD      budesonide-formoterol  2 puff Inhalation BID Kenn Bedoya MD      buPROPion  300 mg Oral Daily Kenn Bedoya MD      calcium carbonate-vitamin D  1 tablet Oral Daily With Breakfast Kenn Bedoya MD      cholecalciferol  5,000 Units Oral Daily Kenn Bedoya MD      dabigatran etexilate  150 mg Oral Q12H Jazmin Bateman MD      digoxin  125 mcg Oral Daily Kenn Bedoya MD      docusate sodium  100 mg Oral BID Kenn Bedoya MD      fluticasone  1 spray Nasal BID Kenn Bedoya MD      furosemide  20 mg Intravenous Once Amy Bullock MD      furosemide  20 mg Intravenous BID (diuretic) Amy Bullock MD      hydrOXYzine HCL  25 mg Oral Q8H [facility] Amy Bullock MD      methocarbamol  750 mg Oral Q8H [facility] Amy Bullock MD      metoprolol  2 5 mg Intravenous Q6H PRN Amy Bullock MD      metoprolol tartrate  25 mg Oral Q12H [facility] Lachelle Ward MD      nystatin   Topical BID Anamaria Mcginnis MD      potassium chloride  20 mEq Oral BID Amy Bullock MD      senna  1 tablet Oral HS Amy Bullock MD      spironolactone  50 mg Oral Daily Amy Bullock MD      traZODone  150 mg Oral HS Brendan Kilpatrick MD       Continuous Infusions:   PRN Meds:  acetaminophen, 650 mg, Q6H PRN  albuterol, 2 5 mg, Q6H PRN  ammonium lactate, , BID PRN  metoprolol, 2 5 mg, Q6H PRN        Objective:   Vitals:   Vitals:    05/11/21 0839   BP:    Pulse: 82   Resp:    Temp:    SpO2:      Body mass index is 58 83 kg/m²  Orthostatic Blood Pressures      Most Recent Value   Blood Pressure  121/53 filed at 05/11/2021 5930   Patient Position - Orthostatic VS  Lying filed at 05/11/2021 7572        Systolic (83NMC), FDJ:862 , Min:107 , NRV:312     Diastolic (60MMO), FPW:34, Min:52, Max:73      Intake/Output Summary (Last 24 hours) at 5/11/2021 1039  Last data filed at 5/11/2021 0701  Gross per 24 hour   Intake 500 ml   Output 1975 ml   Net -1475 ml     Weight (last 2 days)     Date/Time   Weight    05/10/21 1417   (!) 186 (410)    05/10/21 0926   (!) 186 (410)            Invasive Devices     Peripheral Intravenous Line            Peripheral IV 05/10/21 Left Forearm 1 day                Physical Exam  Vitals signs reviewed  Constitutional:       General: He is not in acute distress  Appearance: He is well-developed  He is obese  He is not diaphoretic  HENT:      Head: Normocephalic and atraumatic  Eyes:      General: No scleral icterus       Conjunctiva/sclera: Conjunctivae normal  Pupils: Pupils are equal, round, and reactive to light  Neck:      Musculoskeletal: Normal range of motion and neck supple  Thyroid: No thyromegaly  Vascular: No JVD  Cardiovascular:      Rate and Rhythm: Normal rate  Rhythm irregular  Heart sounds: Normal heart sounds  No murmur  No friction rub  No gallop  Pulmonary:      Effort: Pulmonary effort is normal  No respiratory distress  Breath sounds: Rales present  No wheezing  Abdominal:      General: Bowel sounds are normal  There is no distension  Palpations: Abdomen is soft  Tenderness: There is no abdominal tenderness  Musculoskeletal: Normal range of motion  General: No deformity  Right lower leg: Edema present  Left lower leg: Edema present  Skin:     General: Skin is warm and dry  Findings: No erythema or rash  Neurological:      General: No focal deficit present  Mental Status: He is alert and oriented to person, place, and time  Cranial Nerves: No cranial nerve deficit  Motor: No weakness           Labs:     Results from last 7 days   Lab Units 05/10/21  1655 05/10/21  0950   TROPONIN I ng/mL <0 03 <0 03     CBC with diff:   Results from last 7 days   Lab Units 05/11/21  0653 05/10/21  0950   WBC Thousand/uL 5 39 8 10   HEMOGLOBIN g/dL 12 7 14 2   HEMATOCRIT % 39 9 44 3   MCV fL 95 95   PLATELETS Thousands/uL 168 198   MCH pg 30 2 30 4   MCHC g/dL 31 8 32 1   RDW % 13 0 13 4   MPV fL 10 2 10 6     CMP:  Results from last 7 days   Lab Units 05/11/21  0653 05/10/21  0950   SODIUM mmol/L 141 141   POTASSIUM mmol/L 3 8 4 3   CHLORIDE mmol/L 95* 94*   CO2 mmol/L 43* 42*   BUN mg/dL 15 18   CREATININE mg/dL 0 79 0 84   CALCIUM mg/dL 8 9 9 3   AST U/L  --  11*   ALT U/L  --  10   ALK PHOS U/L  --  75 5   EGFR ml/min/1 73sq m 105 102     NT-proBNP:   Lab Results   Component Value Date    NTBNP 301 (H) 02/12/2017    NTBNP 1,141 (H) 08/15/2016      Magnesium:  Results from last 7 days Lab Units 21  0653 05/10/21  0950   MAGNESIUM mg/dL 1 9 2 0     Coags:    TSH:   Results from last 7 days   Lab Units 05/10/21  0950   TSH 3RD GENERATON uIU/mL 3 222     Hemoglobin A1C:    Lipid Profile:   No results found for: CHOL  Lab Results   Component Value Date    HDL 44 2016     Lab Results   Component Value Date    LDLCALC 80 2016     Lab Results   Component Value Date    TRIG 197 (H) 2016       Imaging & Testing   Cardiac testing:   EKG reviewed personally:  AFib with RVR  Telemetry reviewed personally:  AFib with controlled ventricular response    Echocardiogram today personally reviewed  EF 18%, diastolic dysfunction although in AFib  RV function normal, but RV thickened  No significant valvular pathology    Results for orders placed during the hospital encounter of 17   Echo complete with contrast if indicated    Narrative [address] [place] [id number]  [phone]    Transthoracic Echocardiogram  Limited 2D, Doppler, and Color Doppler    Study date:  2017    Patient: Reyna Rodriguez  MR number: [id number]  Account number: [de-identified]  : 1970  Age: 40 years  Gender: Male  Status: Inpatient  Location: Bedside  Height: 70 in  Weight: 615 lb  BP: 135/ 62 mmHg    Indications: Shortness of breath  Diagnoses: I27 9 - Pulmonary heart disease, unspecified    Sonographer:  JOSE Herman  Interpreting Physician:  Jorge Harman MD  Primary Physician:  RADHA Washington  Group:  [facility]    SUMMARY    LEFT VENTRICLE:  Systolic function was normal  But accurate EF cannot be assessed due to poor endocardial definition  Study limited due to body habitus (615lbs)    HISTORY: PRIOR HISTORY: Congestive heart failure  Atrial fibrillation  Risk factors: diabetes and morbid obesity  Chronic lung disease  PROCEDURE: The procedure was performed at the bedside  This was a routine study  The transthoracic approach was used  The study included limited 2D imaging, limited spectral Doppler, and color Doppler  The heart rate was 79 bpm, at the  start of the study  Images were obtained from the parasternal, apical, subcostal, and suprasternal notch acoustic windows  Echocardiographic views were limited due to low windows, decreased penetration, and lung interference  This was a  technically difficult study  LEFT VENTRICLE: Systolic function was normal  But accurate EF cannot be assessed due to poor endocardial definition  Study limited due to body habitus (615lbs)    RIGHT VENTRICLE: The size was normal  Systolic function was normal  Wall thickness was normal     LEFT ATRIUM: Size was normal     RIGHT ATRIUM: Not well visualized  MITRAL VALVE: Valve structure was normal  There was normal leaflet separation  DOPPLER: The transmitral velocity was within the normal range  There was no evidence for stenosis  There was no regurgitation  AORTIC VALVE: The valve was not well visualized  TRICUSPID VALVE: Not well visualized  The valve structure was normal  There was normal leaflet separation  DOPPLER: The transtricuspid velocity was within the normal range  There was no evidence for stenosis  There was no regurgitation  PULMONIC VALVE: Not well visualized  PERICARDIUM: There was no pericardial effusion  The pericardium was normal in appearance  AORTA: Not well visualized  [address] Accredited Echocardiography [facility]    Prepared and electronically signed by    Gage Hernandez MD  Signed 15-Feb-2017 08:50:03       No results found for this or any previous visit  No results found for this or any previous visit  No results found for this or any previous visit  Imaging:   I have personally reviewed pertinent films in PACS  Xr Chest 1 View Portable    Result Date: 5/10/2021  Narrative: CHEST INDICATION:  Shortness of breath   COMPARISON:  7/15/2019, CT chest 2/14/2017 EXAM PERFORMED/VIEWS:  XR CHEST PORTABLE Images: 2 FINDINGS:  Study is limited by patient body habitus  Prominence of the cardiomediastinal silhouette may reflect combination of cardiomegaly and mediastinal lipomatosis  Overall appearance is not significantly changed from prior CT study  The lungs are grossly clear  No pneumothorax or pleural effusion  Osseous structures appear within normal limits for patient age  Impression: Limited study  Probably no acute cardiopulmonary disease within limitations  Workstation performed: [id number]     Cta Ed Chest Pe Study    Result Date: 5/10/2021  Narrative: CTA - CHEST WITH IV CONTRAST - PULMONARY ANGIOGRAM INDICATION:   Shortness of breath sob, chest pressure  COMPARISON: 2/4/2017 TECHNIQUE: CTA examination of the chest was performed using angiographic technique according to a protocol specifically tailored to evaluate for pulmonary embolism  Axial, sagittal, and coronal 2D reformatted images were created from the source data and  submitted for interpretation  In addition, coronal 3D MIP postprocessing was performed on the acquisition scanner  Radiation dose length product (DLP) for this visit:  1248 7 mGy-cm   This examination, like all CT scans performed in the [facility], was performed utilizing techniques to minimize radiation dose exposure, including the use of iterative  reconstruction and automated exposure control  IV Contrast:  100 mL of iohexol (OMNIPAQUE)  FINDINGS: There is degradation of image quality with limited visualization of the distal pulmonary arterial tree due to patient's body habitus  PULMONARY ARTERIAL TREE:  No pulmonary embolus is seen  Dilatation of the main pulmonary artery is again seen, measuring 5 4 cm, previously 4 9 cm  LUNGS:  Diffuse groundglass is present  No focal consolidation or tracheal lesion identified  PLEURA:  Unremarkable  HEART/GREAT VESSELS:  Unremarkable for patient's age   MEDIASTINUM AND SUSU: Unremarkable  CHEST WALL AND LOWER NECK:   Unremarkable  VISUALIZED STRUCTURES IN THE UPPER ABDOMEN:  Unremarkable  OSSEOUS STRUCTURES:  No acute fracture or destructive osseous lesion  Impression: 1  No pulmonary emboli within study limitations  2   Pulmonary artery aneurysm, now measuring 5 4 cm, previously 4 9 cm  3   Diffuse groundglass, which may represent pulmonary edema  or infectious/inflammatory process  No focal consolidation  Workstation performed: Serena Jacobson MD    Portions of the record may have been created with voice recognition software  Occasional wrong word or "sound a like" substitutions may have occurred due to the inherent limitations of voice recognition software  Read the chart carefully and recognize, using context, where substitutions have occurred

## 2021-05-11 NOTE — PHYSICAL THERAPY NOTE
PHYSICAL THERAPY TREATMENT  [id number]    NAME:  Cathi Garza  DATE: 05/11/21    AGE:   49 y o  Mrn:   [id number]  Length Of Stay: 0    ADMIT DX:  Shortness of breath [R06 02]  SOB (shortness of breath) [R06 02]  Obesity [E66 9]  COPD exacerbation (HCC) [J44 1]  Chest pain, unspecified type [R07 9]    Past Medical History:   Diagnosis Date    [facility]ib ([facility] )     Anemia     Anxiety     Cancer ([place] [address] )     Cardiac disease     Cellulitis     COPD (chronic obstructive pulmonary disease) ([facility] )     Depression     Diabetes mellitus ([place] [address] )     DVT (deep venous thrombosis) (HCC)     GERD (gastroesophageal reflux disease)     HBP (high blood pressure)     Heart failure (HCC)     Hx of blood clots     Hypertension     Hypokalemia     Hypothyroid     Obesity     Psychiatric disorder      Past Surgical History:   Procedure Laterality Date    ABDOMINAL HERNIA REPAIR  05/2019    CHOLECYSTECTOMY      Lap    GASTRECTOMY SLEEVE LAPAROSCOPIC  08/2018    KNEE SURGERY Right     open wound, injury     LEG SURGERY Right 2009       Performed at least 2 patient identifiers during session: Name and ID bracelet       05/11/21 1449   PT Last Visit   PT Visit Date 05/11/21   Note Type   Note Type Treatment   Pain Assessment   Pain Assessment Tool 0-10   Pain Score 5   Pain Location/Orientation Orientation: Bilateral;Location: Knee   Pain Onset/Description Onset: Ongoing; Descriptor: Aching   Effect of Pain on Daily Activities Limits tolerance of mobility, limits rest and comfort   Patient's Stated Pain Goal No pain   Hospital Pain Intervention(s) Repositioned; Ambulation/increased activity   Multiple Pain Sites No   Restrictions/Precautions   Weight Bearing Precautions Per Order No   Other Precautions Bed Alarm;Telemetry; Fall Risk;Pain;O2;Limb alert  (2L O2 via NV, morbid obesity)   General   Chart Reviewed Yes   Additional Pertinent History Pt admitted under observation 5/10/2021 from [place] at [facility] with c/o SOB, cough and congestion   Response to Previous Treatment Patient with no complaints from previous session  Family/Caregiver Present No   Cognition   Overall Cognitive Status WFL   Arousal/Participation Alert   Attention Within functional limits   Orientation Level Oriented X4   Memory Within functional limits   Following Commands Follows all commands and directions without difficulty   Subjective   Subjective "I want to move on my own"   Bed Mobility   Supine to Sit 5  Supervision   Additional items Assist x 1;Bedrails; Increased time required   Sit to Supine 5  Supervision   Additional items Assist x 1;Bedrails; Increased time required   Additional Comments Pt able to self scoot towards [facility] with bed in trendelenburg, B UE use on bed rails, pushing down through hooklying knees  Transfers   Sit to Stand 2  Maximal assistance   Additional items Assist x 2;Verbal cues; Increased time required  (RW, bed height elevated)   Stand to Sit 2  Maximal assistance   Additional items Assist x 2;Bed elevated  (poor control to descent)   Stand pivot Unable to assess   Additional Comments Pt able to tolerate standing with RW 3sec x1 trial; 6 sec x2 trials   Ambulation/Elevation   Gait pattern Not appropriate   Balance   Static Sitting Good   Dynamic Sitting Good   Static Standing Poor   Endurance Deficit   Endurance Deficit Yes   Endurance Deficit Description 3L O2 via NC   Activity Tolerance   Activity Tolerance Patient limited by fatigue;Patient limited by pain  (limited due to body habitus)   Nurse Made Aware FREDIS Chandler in room t/o session   Assessment   Prognosis Fair   Problem List Decreased strength;Decreased endurance; Impaired balance;Decreased mobility; Decreased range of motion;Obesity; Decreased skin integrity;Pain   Assessment Pt seen for PT session today to focus on bed mobility and upright tolerance  Pt agreeable to participate in PT session  Pt performed supine to short sit to L in flat bed with supervision   Pt insisting on performing without assist of PT and required multiple trials and momentum to complete  Pt able to sit EOB for 25 minutes with B UE support  Pt attempted sit to stand transfer MAXAx2 + assist for RW stability however pt unable to maintain upright >3 seconds  Pt tends to hold breath while performing transfers and educated on importance of breathing throughout mobility  Pt is slightly SOB from exertion requiring seated rest break  Pt then performs 2 sit to stand transfers [address] x2 with RW + assist for RW stability achieving full upright stand for 6 seconds  Pt with noted fatigue by tremors in B LE requiring seated rest break between trials  Pt able to perform sit to supine transfer with supervision utilizing momentum to complete  Pt then able to reposition DAT using bed rails with bed in Trendelenburg  Pt left supine in bed with SCDs on, bed alarm activated and call bell within reach  Pt will continue to benefit from skilled PT in the acute care setting to address impairments and improve function  Continue to recommend d/c to facility with rehabilitation services  Goals   Patient Goals "to walk like I was before " (pt referencing his ambulation before he had COVID-19 in Dec 2020  STG Expiration Date 05/20/21   Short Term Goal #1 Patient PT goals established in order to address patient self reported goal of "to walk like before"  1  Pt will complete all bed mobility in hospital bed with S, in order to promote increased OOB functional mobility & to facilitate self repositioning for pressure relief  2  Pt will complete all transfers with LRAD and MODA, in order to increase safety with functional mobility  3  Pt will ambulate 10ft with LRAD and MODA in order to increase safety with in room functional mobility  4  Pt will tolerate >4 minutes of functional static stand with B UE support and S, in order to facilitate LE strengthening and improved functional mobility   5  Pt will improve B LE strength to >/= 4-/5 MMT throughout, in order to increase safety with functional mobility and decrease risk of falls  6  Pt will demonstrate understanding and independence with LE strengthening HEP  7  Pt will improve Low Functioning AM-PAC score to >/= 24/24 in order to increase independence with mobility and decrease burden of care  8  Pt will improve Barthel Index score to >/= 45/100 in order to increase independence and decrease risk of falls  9  Pt will improve Elderly Mobility Scale score to >/= 6/20 in order to decrease burden of care and increase independence with functional mobility and ADLs  10  Pt will improve dynamic sitting balance to >/= Fair+ grade in order to promote safety and increased independence with mobility  11  Pt will tolerate >3hrs OOB in upright position, in order to improve muscular endurance and respiratory status  PT Treatment Day 1   Plan   Treatment/Interventions Functional transfer training;LE strengthening/ROM; Therapeutic exercise; Endurance training;Patient/family training;Equipment eval/education; Bed mobility;Gait training; Compensatory technique education;Spoke to nursing;OT   Progress Progressing toward goals   PT Frequency Other (Comment)  (3-5x/wk)   Recommendation   PT Discharge Recommendation Return to facility with rehabilitation services   Additional Comments Pt anticipated to require Bariatric Hospital bed with overhead trapeze in order to facilitate pt self repositioning in bed for comfort and pressure relief     AM-PAC Basic Mobility Inpatient   Turning in Bed Without Bedrails 3   Lying on Back to Sitting on Edge of Flat Bed 3   Moving Bed to Chair 1   Standing Up From Chair 1   Walk in Room 1   Climb 3-5 Stairs 1   Basic Mobility Inpatient Raw Score 10   Turning Head Towards Sound 4   Follow Simple Instructions 4   Low Function Basic Mobility Raw Score 18   Low Function Basic Mobility Standardized Score 29 25     The patient's AM-PAC Basic Mobility Inpatient Short Form Raw Score is 18, Standardized Score is 29 25  A standardized score less than 42 9 suggests the patient may benefit from discharge to post-acute rehabilitation services  Please also refer to the recommendation of the physical therapist for safe discharge planning      Edie Freeman, SPT

## 2021-05-11 NOTE — ASSESSMENT & PLAN NOTE
Wt Readings from Last 3 Encounters:   05/10/21 (!) 186 kg (410 lb)   06/15/20 (!) 161 kg (355 lb 3 2 oz)   04/18/19 (!) 189 kg (417 lb)     · Followed up with cardiology as outpatient - last seen in Jan 2021   · As per chart review, echo reports were reviewed  Prior ECHO (2017, no recent ECHO available) results indicate poor endocardial definition, but overall normal systolic function   · As per chart review cardiology advised to continue lasix 40 mg PO BID, however patient has been only on 20 mg PO BID at the nursing facility as per nursing home medication records   · S/p 20 mg IV lasix dose at the time of admission - had a urinary output of 850 ml within 1 5 hours of dose administration   20 mg IV dose was chosen as the patient had soft BP ( in the 100s SBP )   · Patient's daily weight check is an issue as the bed is broken  · Transthoracic echo performed today -- final report pending however has been personally reviewed by cardiologist   · Cardiology on board     PLAN :   As per cardiology,   · Aggressively diurese the patient with 40 mg IV tid, given his current weight (pt is morbidly obese with a BMI of 58) and an estimated weight gain of at least 20 lbs, for the next 48 hours   · Closely monitor renal function, electrolytes and serum bicarbonate   · If bicarbonate rises to > 44 --> advised to replace 1 of the doses of lasix with 500 mg IV of diamox   · Continue fluid restriction, strict I/O charting, daily weights ( discussed with the nurse regarding fixing weight check issues)   · Needs to be on a strict low-salt diet at the time of discharge   · Outpatient follow up within 1 week at the cardiology office - will include instructions in discharge paperwork

## 2021-05-11 NOTE — RESPIRATORY THERAPY NOTE
RT Protocol Note  Marilu Jordan 49 y o  male MRN: [id number]  Unit/Bed#: -01 Encounter: [id number]    Assessment    Active Problems:    Shortness of breath    Morbid obesity (HCC)    Obesity hypoventilation syndrome (HCC)    Acute on chronic diastolic congestive heart failure (HCC)    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HCC)    DVT (deep venous thrombosis) (HCC)    Atrial fibrillation (HCC)    COPD (chronic obstructive pulmonary disease) (HCC)    Hypothyroidism      Home Pulmonary Medications:    Home Devices/Therapy: (P) BiPAP/CPAP    Past Medical History:   Diagnosis Date    Afib ([place] [address] )     Anemia     Anxiety     Cancer ([place] [address] )     Cardiac disease     Cellulitis     COPD (chronic obstructive pulmonary disease) ([place] [address] )     Depression     Diabetes mellitus ([place] [address] )     [facility]     GERD (gastroesophageal reflux disease)     HBP (high blood pressure)     Heart failure ([facility]     Hx of blood clots     Hypertension     Hypokalemia     Hypothyroid     Obesity     Psychiatric disorder      Social History     Socioeconomic History    Marital status: Single     Spouse name: None    Number of children: None    Years of education: None    Highest education level: None   Occupational History    Occupation: Currently at [employer] resource strain: None    Food insecurity     Worry: None     Inability: None    Transportation needs     Medical: None     Non-medical: None   Tobacco Use    Smoking status: Former Smoker     Packs/day: 1 00     Years: 15 00     Pack years: 15 00    Smokeless tobacco: Never Used    Tobacco comment: 1 5ppd x 23 years, quit 2014   Substance and Sexual Activity    Alcohol use: Not Currently    Drug use: No    Sexual activity: None   Lifestyle    Physical activity     Days per week: None     Minutes per session: None    Stress: None   Relationships    Social connections     Talks on phone: None     Gets together: None Attends [employer] witness service: None     Active member of club or organization: None     Attends meetings of clubs or organizations: None     Relationship status: None    Intimate partner violence     Fear of current or ex partner: None     Emotionally abused: None     Physically abused: None     Forced sexual activity: None   Other Topics Concern    None   Social History Narrative    Do you currently or have you served in the [employer]: No    Were you activated, into active duty, as a member of the [employer] or as a Reservist: No    Live alone or with others: with others [facility] at [facility]    Marital status: Single    Are you currently employed: No    Alcohol intake: None    Passive smoke exposure: Yes    Asbestos exposure: No    TB exposure: No    Environmental exposure: No    Animal exposure: Yes    uses pap nebulizer and oxygen       Subjective         Objective    Physical Exam:   Assessment Type: (P) Assess only  General Appearance: (P) Awake, Alert  Respiratory Pattern: (P) Normal  Chest Assessment: (P) Chest expansion symmetrical  Bilateral Breath Sounds: (P) Diminished    Vitals:  Blood pressure 107/70, pulse 83, temperature 97 8 °F (36 6 °C), temperature source Tympanic, resp  rate 18, height 5' 10" (1 778 m), weight (!) 186 kg (410 lb), SpO2 98 %  Imaging and other studies: I have personally reviewed pertinent reports  Plan    Respiratory Plan: Mild Distress pathway  Airway Clearance Plan: (P) Flutter     Resp Comments: (P) Pt  was seen for airway clearance protocol  Pt  stated stated that he was not able to expel phlegm  Breath sounds were diminished  Will provide and  pt  on how to use the flutter valve  Will continue to monitor

## 2021-05-11 NOTE — PROGRESS NOTES
Adela U  67   Progress Note - Avinash Dietrich 1970, 49 y o  male MRN: [id number]  Unit/Bed#: -01 Encounter: [id number]  Primary Care Provider: Courtney Escobar MD   Date and time admitted to hospital: 5/10/2021  9:27 AM    Shortness of breath  Assessment & Plan  · Patient presented with SOB, started few days ago worsened on the morning of admission   · Associated symptoms : chest heaviness, orthopnea, PND, decreased urination with the current dose of lasix, increase in cough and sputum production, palpitations, subjective feeling of "fluid in his body / chest"   · No fever, chills, URTI symptoms, calf swelling / pain above baseline, no wheezing, change in color / consistency / character of sputum   · Vitals : saturating at 99% on baseline oxygen of 2 L, intermittent episodes of tachycardia  Monitor shows Afib with RVR - HR - sometimes increasing to 120s   · Labs : normal troponin, mildly elevated BNP - 125 ( patient is morbidly obese), elevated bicarbonate, normal renal function, no leukocytosis   · Chest CTA : ruled out PE, reported diffuse groundglass opacities -- could represent pulmonary edema or infectious / inflammatory process  · Etiology of SOB likely CHF exacerbation, in the setting of tachyarrhythmia  · Patient received 20 mg IV lasix dose on the day of admission due to soft BP - had a urine output of 850 ml within 1 5 hours  · BP improved - currently 121/53 mmHg    · Patient is supposed to be on a 40 mg PO BID dose at the nursing facility, however is only on 20 mg PO BID as per nursing home medication records     PLAN :   · See further management under "acute on chronic diastolic CHF"   · Continue oxygen supplementation via nasal cannula to maintain sats of 88-92%   · Continue home inhaler - Symbicort, albuterol nebs PRN     Obesity hypoventilation syndrome ([address] )  Assessment & Plan  · K/C/O obesity hypoventilation syndrome   · Follows a pulmonologist as outpatient - last seen in March 2021   · Previous medical history is also suggestive of severe FILIPPO ( based on home sleep study ) and severe restrictive pattern on PFT   · Patient is on continuous oxygen supplementation of 2 L   · Was advised during previous office visit to strictly use BiPAP at night for maximum benefit and weight loss --patient reports that he has not been fully compliant with his BiPAP use  · Labs : elevated bicarbonate- 43, is likely compensation for his chronic CO2 retention     PLAN:   · Will order a VBG to estimate current level of  CO2 retention   · Patient is alert and oriented x 3, with no mental status changes   · Continue BiPAP HS and PRN - respiratory therapy on board     Morbid obesity ([facility] )  Assessment & Plan  · S/p gastric sleeve surgery in 2018   · Patient used to weigh approximately 650 pounds, lost about 250-260 pounds and weight has been stable since  · As per nursing home staff, patient refuses to be weighed regularly but they do estimate a significant weight gain  · Patient reported to cardiology that he at least gained about 20 lbs   · Patient follows with bariatric surgery - last seen on 04/21     PLAN :   · Continue outpatient management and follow up with bariatric surgery     DVT (deep venous thrombosis) ([facility] )  Assessment & Plan  · H/o DVT and PE, s/p IVC filter insertion in 2019   · Patient was supposed to have the filter removed, but could not, was referred to [facility] but he did not have that done as he switched insurance   · Is on anticoagulation with Pradaxa for Afib     · PLAN:   Continue anticoagulation with Pradaxa     Acute on chronic diastolic congestive heart failure [facility])  Assessment & Plan  Wt Readings from Last 3 Encounters:   05/10/21 (!) 186 kg (410 lb)   06/15/20 (!) 161 kg (355 lb 3 2 oz)   04/18/19 (!) 189 kg (417 lb)     · Followed up with cardiology as outpatient - last seen in Jan 2021   · As per chart review, echo reports were reviewed   Prior ECHO (2017, no recent ECHO available) results indicate poor endocardial definition, but overall normal systolic function   · As per chart review cardiology advised to continue lasix 40 mg PO BID, however patient has been only on 20 mg PO BID at the nursing facility as per nursing home medication records   · S/p 20 mg IV lasix dose at the time of admission - had a urinary output of 850 ml within 1 5 hours of dose administration   20 mg IV dose was chosen as the patient had soft BP ( in the 100s SBP )   · Patient's daily weight check is an issue as the bed is broken  · Transthoracic echo performed today -- final report pending however has been personally reviewed by cardiologist   · Cardiology on board     PLAN :   As per cardiology,   · Aggressively diurese the patient with 40 mg IV tid, given his current weight (pt is morbidly obese with a BMI of 58) and an estimated weight gain of at least 20 lbs, for the next 48 hours   · Closely monitor renal function, electrolytes and serum bicarbonate   · If bicarbonate rises to > 44 --> advised to replace 1 of the doses of lasix with 500 mg IV of diamox   · Continue fluid restriction, strict I/O charting, daily weights ( discussed with the nurse regarding fixing weight check issues)   · Needs to be on a strict low-salt diet at the time of discharge   · Outpatient follow up within 1 week at the cardiology office - will include instructions in discharge paperwork             Atrial fibrillation [facility])  Assessment & Plan  · Patient is a known case of atrial fibrillation, on anticoagulation with pradaxa   · Is also on digoxin 0 125 mg PO daily, and metoprolol tartrate 25 mg PO q12h   · Patient presented with SOB, chest discomfort but no hypoxia   · HR on admission - 109, had intermittent tachycardic episodes in the 110-120s   · Digoxin level - 0 8   · S/p 12 5 mg PO Lopressor x 1 dose at the time of admission due to soft Bps ( SBP - 100s )   · Currently HR is controlled in the 80-90s     PLAN : · Continue Telemetry monitoring   · Revert back to home dose of metoprolol 25 mg PO q12h with holding parameters   · Continue digoxin at home dose   · Continue order lopressor IV PRN for HR > 120, with holding parameters   · Keep magnesium > 2, potassium > 4   · Continue pradaxa at home dose for anticoagulation       QT prolongation  Assessment & Plan  · Patient presented with a prolonged QTc of 537 ms  · On telemetry monitoring   · Repeat EKG today shows a QTc of 569 ms   · Serum potassium is 3 8, magnesium is 1 9    PLAN :   · Avoid QTc prolonging agents   · Continue telemetry monitoring   · Will order 40 MEq PO KCl to maintain serum potassium > 4     Hypothyroidism  Assessment & Plan  · Patient has a history of hypothyroidism, was on levothyroxine 25 mcg PO daily   · This medication is not his list of medications that he takes at the nursing home   · Last TSH in August 2020 - 1 75   · TSH is normal at 3 2 on 05/10    PLAN :   · At the nursing home, patient is not on levothyroxine-- will continue to hold off     COPD (chronic obstructive pulmonary disease) (HCC)  Assessment & Plan  · Known case of COPD, former smoker, quit about 6 years ago, used to smoke 1 ppd x 20 years   · Is on symbicort at the nursing home   · Reports increased cough with increase  In amount of whitish sputum production, no change in color / consistency   · No wheeze heard in physical exam, however diminished breath sound and difficult to appreciate due to body habitus     · Patient has no mental status abnormalities, is alert and oriented x 3     PLAN :  · Continue symbicort while inpatient   · Continue albuterol nebs PRN       Pulmonary artery aneurysm [facility])  Assessment & Plan  · Pulmonary artery aneurysm detected on CTA   · In comparison to imaging obtained in 2017, size increased from 4 9 cm --> 5 4 cm     PLAN :   · Continue outpatient management         INTERNAL MEDICINE RESIDENCY PROGRESS NOTE     Name: Randell King   Age & Sex: 49 y o  male   MRN: [id number]  Unit/Bed#: -01   Encounter: [id number]    PATIENT INFORMATION     Name: Marilu Jordan   Age & Sex: 49 y o  male   MRN: [id number]  Hospital Stay Days: 0    ASSESSMENT/PLAN     Active Problems:    Shortness of breath    Morbid obesity (HCC)    Obesity hypoventilation syndrome (HCC)    DVT (deep venous thrombosis) (HCC)    Acute on chronic diastolic congestive heart failure (HCC)    Atrial fibrillation (HCC)    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HCC)    COPD (chronic obstructive pulmonary disease) (HCC)    Hypothyroidism    QT prolongation      Shortness of breath  Assessment & Plan  · Patient presented with SOB, started few days ago worsened on the morning of admission   · Associated symptoms : chest heaviness, orthopnea, PND, decreased urination with the current dose of lasix, increase in cough and sputum production, palpitations, subjective feeling of "fluid in his body / chest"   · No fever, chills, URTI symptoms, calf swelling / pain above baseline, no wheezing, change in color / consistency / character of sputum   · Vitals : saturating at 99% on baseline oxygen of 2 L, intermittent episodes of tachycardia  Monitor shows Afib with RVR - HR - sometimes increasing to 120s   · Labs : normal troponin, mildly elevated BNP - 125 ( patient is morbidly obese), elevated bicarbonate, normal renal function, no leukocytosis   · Chest CTA : ruled out PE, reported diffuse groundglass opacities -- could represent pulmonary edema or infectious / inflammatory process  · Etiology of SOB likely CHF exacerbation, in the setting of tachyarrhythmia  · Patient received 20 mg IV lasix dose on the day of admission due to soft BP - had a urine output of 850 ml within 1 5 hours  · BP improved - currently 121/53 mmHg    · Patient is supposed to be on a 40 mg PO BID dose at the nursing facility, however is only on 20 mg PO BID as per nursing home medication records     PLAN :   · See further management under "acute on chronic diastolic CHF"   · Continue oxygen supplementation via nasal cannula to maintain sats of 88-92%   · Continue home inhaler - Symbicort, albuterol nebs PRN     Obesity hypoventilation syndrome ([facility] )  Assessment & Plan  · K/C/O obesity hypoventilation syndrome   · Follows a pulmonologist as outpatient - last seen in March 2021   · Previous medical history is also suggestive of severe FILIPPO ( based on home sleep study ) and severe restrictive pattern on PFT   · Patient is on continuous oxygen supplementation of 2 L   · Was advised during previous office visit to strictly use BiPAP at night for maximum benefit and weight loss --patient reports that he has not been fully compliant with his BiPAP use  · Labs : elevated bicarbonate- 43, is likely compensation for his chronic CO2 retention     PLAN:   · Will order a VBG to estimate current level of  CO2 retention   · Patient is alert and oriented x 3, with no mental status changes   · Continue BiPAP HS and PRN - respiratory therapy on board     Morbid obesity ([place] [address] )  Assessment & Plan  · S/p gastric sleeve surgery in 2018   · Patient used to weigh approximately 650 pounds, lost about 250-260 pounds and weight has been stable since  · As per nursing home staff, patient refuses to be weighed regularly but they do estimate a significant weight gain     · Patient reported to cardiology that he at least gained about 20 lbs   · Patient follows with bariatric surgery - last seen on 04/21     PLAN :   · Continue outpatient management and follow up with bariatric surgery     DVT (deep venous thrombosis) ([facility] )  Assessment & Plan  · H/o DVT and PE, s/p IVC filter insertion in 2019   · Patient was supposed to have the filter removed, but could not, was referred to [facility] but he did not have that done as he switched insurance   · Is on anticoagulation with Pradaxa for Afib     · PLAN:   Continue anticoagulation with Pradaxa     Acute on chronic diastolic congestive heart failure [facility])  Assessment & Plan  Wt Readings from Last 3 Encounters:   05/10/21 (!) 186 kg (410 lb)   06/15/20 (!) 161 kg (355 lb 3 2 oz)   04/18/19 (!) 189 kg (417 lb)     · Followed up with cardiology as outpatient - last seen in Jan 2021   · As per chart review, echo reports were reviewed  Prior ECHO (2017, no recent ECHO available) results indicate poor endocardial definition, but overall normal systolic function   · As per chart review cardiology advised to continue lasix 40 mg PO BID, however patient has been only on 20 mg PO BID at the nursing facility as per nursing home medication records   · S/p 20 mg IV lasix dose at the time of admission - had a urinary output of 850 ml within 1 5 hours of dose administration   20 mg IV dose was chosen as the patient had soft BP ( in the 100s SBP )   · Patient's daily weight check is an issue as the bed is broken  · Transthoracic echo performed today -- final report pending however has been personally reviewed by cardiologist   · Cardiology on board     PLAN :   As per cardiology,   · Aggressively diurese the patient with 40 mg IV tid, given his current weight (pt is morbidly obese with a BMI of 58) and an estimated weight gain of at least 20 lbs, for the next 48 hours   · Closely monitor renal function, electrolytes and serum bicarbonate   · If bicarbonate rises to > 44 --> advised to replace 1 of the doses of lasix with 500 mg IV of diamox   · Continue fluid restriction, strict I/O charting, daily weights ( discussed with the nurse regarding fixing weight check issues)   · Needs to be on a strict low-salt diet at the time of discharge   · Outpatient follow up within 1 week at the cardiology office - will include instructions in discharge paperwork             Atrial fibrillation [facility])  Assessment & Plan  · Patient is a known case of atrial fibrillation, on anticoagulation with pradaxa   · Is also on digoxin 0 125 mg PO daily, and metoprolol tartrate 25 mg PO q12h · Patient presented with SOB, chest discomfort but no hypoxia   · HR on admission - 109, had intermittent tachycardic episodes in the 110-120s   · Digoxin level - 0 8   · S/p 12 5 mg PO Lopressor x 1 dose at the time of admission due to soft Bps ( SBP - 100s )   · Currently HR is controlled in the 80-90s     PLAN :   · Continue Telemetry monitoring   · Revert back to home dose of metoprolol 25 mg PO q12h with holding parameters   · Continue digoxin at home dose   · Continue order lopressor IV PRN for HR > 120, with holding parameters   · Keep magnesium > 2, potassium > 4   · Continue pradaxa at home dose for anticoagulation       QT prolongation  Assessment & Plan  · Patient presented with a prolonged QTc of 537 ms  · On telemetry monitoring   · Repeat EKG today shows a QTc of 569 ms   · Serum potassium is 3 8, magnesium is 1 9    PLAN :   · Avoid QTc prolonging agents   · Continue telemetry monitoring   · Will order 40 MEq PO KCl to maintain serum potassium > 4     Hypothyroidism  Assessment & Plan  · Patient has a history of hypothyroidism, was on levothyroxine 25 mcg PO daily   · This medication is not his list of medications that he takes at the nursing home   · Last TSH in August 2020 - 1 75   · TSH is normal at 3 2 on 05/10    PLAN :   · At the nursing home, patient is not on levothyroxine-- will continue to hold off     COPD (chronic obstructive pulmonary disease) (HCC)  Assessment & Plan  · Known case of COPD, former smoker, quit about 6 years ago, used to smoke 1 ppd x 20 years   · Is on symbicort at the nursing home   · Reports increased cough with increase  In amount of whitish sputum production, no change in color / consistency   · No wheeze heard in physical exam, however diminished breath sound and difficult to appreciate due to body habitus     · Patient has no mental status abnormalities, is alert and oriented x 3     PLAN :  · Continue symbicort while inpatient   · Continue albuterol nebs PRN Pulmonary artery aneurysm [facility])  Assessment & Plan  · Pulmonary artery aneurysm detected on CTA   · In comparison to imaging obtained in 2017, size increased from 4 9 cm --> 5 4 cm     PLAN :   · Continue outpatient management       Disposition: Pending management of acute on chronic diastolic CHF  Patient is from [place] [address] facility  SUBJECTIVE     Patient seen and examined  No acute events overnight  Patient reports mild improvement In his breathing and chest discomfort  He denies nausea, vomiting  Is able to tolerate diet  Continues to be on 3 L supplemental oxygen via nasal cannula  OBJECTIVE     Vitals:    21 0728 21 0742 21 0839 21 1201   BP: 121/53   147/64   BP Location: Right arm   Right arm   Pulse: 90  82 97   Resp: 18   20   Temp: (!) 96 5 °F (35 8 °C)   97 7 °F (36 5 °C)   TempSrc: Tympanic   Tympanic   SpO2: 96% 96%  96%   Weight:       Height:          Temperature:   Temp (24hrs), Av 7 °F (36 5 °C), Min:96 5 °F (35 8 °C), Max:98 6 °F (37 °C)    Temperature: 97 7 °F (36 5 °C)  Intake & Output:  I/O        07 - 05/10 0700 05/10 07 -  0700  07 -  0700    P  O    500    Total Intake(mL/kg)   500 (2 7)    Urine (mL/kg/hr)  1375 600 (0 8)    Stool  0     Total Output  1375 600    Net  -1375 -100           Unmeasured Stool Occurrence  1 x         Weights:   IBW (Ideal Body Weight): 73 kg    Body mass index is 58 83 kg/m²  Weight (last 2 days)     Date/Time   Weight    05/10/21 1417   (!) 186 (410)    05/10/21 0926   (!) 186 (410)            Physical Exam  Vitals signs and nursing note reviewed  Constitutional:       General: He is not in acute distress  Appearance: He is well-developed  He is obese  HENT:      Head: Normocephalic and atraumatic  Mouth/Throat:      Mouth: Mucous membranes are moist    Eyes:      General:         Right eye: No discharge  Left eye: No discharge        Conjunctiva/sclera: Conjunctivae normal    Neck:      Musculoskeletal: Neck supple  Cardiovascular:      Rate and Rhythm: Normal rate  Rhythm irregular  Pulses: Normal pulses  Heart sounds: Normal heart sounds  No murmur  Pulmonary:      Effort: Pulmonary effort is normal  No respiratory distress  Comments: diminished breath sounds bilaterally, cannot appreciate wheeze/ crackles however exam is limited due to body habitus   Abdominal:      Palpations: Abdomen is soft  Tenderness: There is no abdominal tenderness  Musculoskeletal:      Right lower leg: Edema present  Left lower leg: Edema present  Skin:     General: Skin is warm and dry  Capillary Refill: Capillary refill takes less than 2 seconds  Neurological:      General: No focal deficit present  Mental Status: He is alert and oriented to person, place, and time  Psychiatric:         Mood and Affect: Mood normal        LABORATORY DATA     Labs: I have personally reviewed pertinent reports  Results from last 7 days   Lab Units 05/11/21  0653 05/10/21  0950   WBC Thousand/uL 5 39 8 10   HEMOGLOBIN g/dL 12 7 14 2   HEMATOCRIT % 39 9 44 3   PLATELETS Thousands/uL 168 198   NEUTROS PCT % 69 73   MONOS PCT % 7 7      Results from last 7 days   Lab Units 05/11/21  0653 05/10/21  0950   POTASSIUM mmol/L 3 8 4 3   CHLORIDE mmol/L 95* 94*   CO2 mmol/L 43* 42*   BUN mg/dL 15 18   CREATININE mg/dL 0 79 0 84   CALCIUM mg/dL 8 9 9 3   ALK PHOS U/L  --  75 5   ALT U/L  --  10   AST U/L  --  11*     Results from last 7 days   Lab Units 05/11/21  0653 05/10/21  0950   MAGNESIUM mg/dL 1 9 2 0                  Results from last 7 days   Lab Units 05/10/21  1655 05/10/21  0950   TROPONIN I ng/mL <0 03 <0 03       IMAGING & DIAGNOSTIC TESTING     Radiology Results: I have personally reviewed pertinent reports  Xr Chest 1 View Portable    Result Date: 5/10/2021  Impression: Limited study  Probably no acute cardiopulmonary disease within limitations  Workstation performed: [id number]     Cta Ed Chest Pe Study    Result Date: 5/10/2021  Impression: 1  No pulmonary emboli within study limitations  2   Pulmonary artery aneurysm, now measuring 5 4 cm, previously 4 9 cm  3   Diffuse groundglass, which may represent pulmonary edema  or infectious/inflammatory process  No focal consolidation  Workstation performed: [id number]     Other Diagnostic Testing: I have personally reviewed pertinent reports      ACTIVE MEDICATIONS     Current Facility-Administered Medications   Medication Dose Route Frequency    acetaminophen (TYLENOL) tablet 650 mg  650 mg Oral Q6H PRN    albuterol inhalation solution 2 5 mg  2 5 mg Nebulization Q6H PRN    ammonium lactate (LAC-HYDRIN) 12 % cream   Topical BID PRN    artificial tear (LUBRIFRESH P M ) ophthalmic ointment   Both Eyes HS    budesonide-formoterol (SYMBICORT) 160-4 5 mcg/act inhaler 2 puff  2 puff Inhalation BID    buPROPion (WELLBUTRIN XL) 24 hr tablet 300 mg  300 mg Oral Daily    calcium carbonate-vitamin D (OSCAL-D) 500 mg-200 units per tablet 1 tablet  1 tablet Oral Daily With Breakfast    cholecalciferol (VITAMIN D3) tablet 5,000 Units  5,000 Units Oral Daily    dabigatran etexilate (PRADAXA) capsule 150 mg  150 mg Oral Q12H [facility]    digoxin (LANOXIN) tablet 125 mcg  125 mcg Oral Daily    docusate sodium (COLACE) capsule 100 mg  100 mg Oral BID    fluticasone (FLONASE) 50 mcg/act nasal spray 1 spray  1 spray Nasal BID    furosemide (LASIX) injection 40 mg  40 mg Intravenous TID (diuretic)    hydrOXYzine HCL (ATARAX) tablet 25 mg  25 mg Oral Q8H [facility]    methocarbamol (ROBAXIN) tablet 750 mg  750 mg Oral Q8H [facility]    metoprolol (LOPRESSOR) injection 2 5 mg  2 5 mg Intravenous Q6H PRN    metoprolol tartrate (LOPRESSOR) tablet 25 mg  25 mg Oral Q12H [facility]    nystatin (MYCOSTATIN) powder   Topical BID    potassium chloride (K-DUR,KLOR-CON) CR tablet 20 mEq  20 mEq Oral BID    senna (SENOKOT) tablet 8 6 mg  1 tablet Oral HS    spironolactone (ALDACTONE) tablet 50 mg  50 mg Oral Daily    traZODone (DESYREL) tablet 150 mg  150 mg Oral HS       VTE Pharmacologic Prophylaxis: Reason for no pharmacologic prophylaxis patient is already anticoagulated with Pradaxa   VTE Mechanical Prophylaxis: sequential compression device    Portions of the record may have been created with voice recognition software  Occasional wrong word or "sound a like" substitutions may have occurred due to the inherent limitations of voice recognition software    Read the chart carefully and recognize, using context, where substitutions have occurred   ==  Josh Tanner MD  [facility]  Internal Medicine Residency PGY-2

## 2021-05-11 NOTE — PLAN OF CARE
Problem: PHYSICAL THERAPY ADULT  Goal: Performs mobility at highest level of function for planned discharge setting  See evaluation for individualized goals  Description: Treatment/Interventions: Functional transfer training, LE strengthening/ROM, Therapeutic exercise, Endurance training, Equipment eval/education, Bed mobility, Gait training, Compensatory technique education, Spoke to nursing, OT  Equipment Recommended: (Equipment needs TBD pending progress)     See flowsheet documentation for full assessment, interventions and recommendations  Outcome: Progressing  Note: Prognosis: Fair  Problem List: Decreased strength, Decreased endurance, Impaired balance, Decreased mobility, Decreased range of motion, Obesity, Decreased skin integrity, Pain  Assessment: Pt seen for PT session today to focus on bed mobility and upright tolerance  Pt agreeable to participate in PT session  Pt performed supine to short sit to L in flat bed with supervision  Pt insisting on performing without assist of PT and required multiple trials and momentum to complete  Pt able to sit EOB for 25 minutes with B UE support  Pt attempted sit to stand transfer MAXAx2 + assist for RW stability however pt unable to maintain upright >3 seconds  Pt tends to hold breath while performing transfers and educated on importance of breathing throughout mobility  Pt is slightly SOB from exertion requiring seated rest break  Pt then performs 2 sit to stand transfers [address] x2 with RW + assist for RW stability achieving full upright stand for 6 seconds  Pt with noted fatigue by tremors in B LE requiring seated rest break between trials  Pt able to perform sit to supine transfer with supervision utilizing momentum to complete  Pt then able to reposition DAT using bed rails with bed in Trendelenburg  Pt left supine in bed with SCDs on, bed alarm activated and call bell within reach   Pt will continue to benefit from skilled PT in the acute care setting to address impairments and improve function  Continue to recommend d/c to facility with rehabilitation services  PT Discharge Recommendation: Return to facility with rehabilitation services          See flowsheet documentation for full assessment

## 2021-05-11 NOTE — ASSESSMENT & PLAN NOTE
· K/C/O obesity hypoventilation syndrome   · Follows a pulmonologist as outpatient - last seen in March 2021   · Previous medical history is also suggestive of severe FILIPPO ( based on home sleep study ) and severe restrictive pattern on PFT   · Patient is on continuous oxygen supplementation of 2 L   · Was advised during previous office visit to strictly use BiPAP at night for maximum benefit and weight loss --patient reports that he has not been fully compliant with his BiPAP use     · Labs : elevated bicarbonate- 43, is likely compensation for his chronic CO2 retention     PLAN:   · Will order a VBG to estimate current level of  CO2 retention   · Patient is alert and oriented x 3, with no mental status changes   · Continue BiPAP HS and PRN - respiratory therapy on board

## 2021-05-12 LAB
ANION GAP SERPL CALCULATED.3IONS-SCNC: 4 MMOL/L (ref 4–13)
ATRIAL RATE: 0 BPM
ATRIAL RATE: 121 BPM
BASOPHILS # BLD AUTO: 0.02 THOUSANDS/ΜL (ref 0–0.1)
BASOPHILS NFR BLD AUTO: 0 % (ref 0–1)
BUN SERPL-MCNC: 15 MG/DL (ref 6–20)
CA-I BLD-SCNC: 1.15 MMOL/L (ref 1.12–1.32)
CALCIUM SERPL-MCNC: 9.3 MG/DL (ref 8.4–10.2)
CHLORIDE SERPL-SCNC: 92 MMOL/L (ref 96–108)
CO2 SERPL-SCNC: 45 MMOL/L (ref 22–33)
CREAT SERPL-MCNC: 0.84 MG/DL (ref 0.5–1.2)
EOSINOPHIL # BLD AUTO: 0.1 THOUSAND/ΜL (ref 0–0.61)
EOSINOPHIL NFR BLD AUTO: 2 % (ref 0–6)
ERYTHROCYTE [DISTWIDTH] IN BLOOD BY AUTOMATED COUNT: 13 % (ref 11.6–15.1)
GFR SERPL CREATININE-BSD FRML MDRD: 102 ML/MIN/1.73SQ M
GLUCOSE SERPL-MCNC: 102 MG/DL (ref 65–140)
GLUCOSE SERPL-MCNC: 123 MG/DL (ref 65–140)
HCT VFR BLD AUTO: 42 % (ref 36.5–49.3)
HGB BLD-MCNC: 13.4 G/DL (ref 12–17)
IMM GRANULOCYTES # BLD AUTO: 0.01 THOUSAND/UL (ref 0–0.2)
IMM GRANULOCYTES NFR BLD AUTO: 0 % (ref 0–2)
LYMPHOCYTES # BLD AUTO: 1.23 THOUSANDS/ΜL (ref 0.6–4.47)
LYMPHOCYTES NFR BLD AUTO: 23 % (ref 14–44)
MAGNESIUM SERPL-MCNC: 2.1 MG/DL (ref 1.6–2.6)
MCH RBC QN AUTO: 30.2 PG (ref 26.8–34.3)
MCHC RBC AUTO-ENTMCNC: 31.9 G/DL (ref 31.4–37.4)
MCV RBC AUTO: 95 FL (ref 82–98)
MONOCYTES # BLD AUTO: 0.45 THOUSAND/ΜL (ref 0.17–1.22)
MONOCYTES NFR BLD AUTO: 8 % (ref 4–12)
MRSA NOSE QL CULT: ABNORMAL
MRSA NOSE QL CULT: ABNORMAL
NEUTROPHILS # BLD AUTO: 3.54 THOUSANDS/ΜL (ref 1.85–7.62)
NEUTS SEG NFR BLD AUTO: 67 % (ref 43–75)
PLATELET # BLD AUTO: 147 THOUSANDS/UL (ref 149–390)
PMV BLD AUTO: 10.3 FL (ref 8.9–12.7)
POTASSIUM SERPL-SCNC: 3.4 MMOL/L (ref 3.5–5)
PR INTERVAL: 180 MS
PR INTERVAL: 47 MS
QRS AXIS: 23 DEGREES
QRS AXIS: 30 DEGREES
QRSD INTERVAL: 92 MS
QRSD INTERVAL: 92 MS
QT INTERVAL: 402 MS
QT INTERVAL: 467 MS
QTC INTERVAL: 537 MS
QTC INTERVAL: 569 MS
RBC # BLD AUTO: 4.43 MILLION/UL (ref 3.88–5.62)
SODIUM SERPL-SCNC: 141 MMOL/L (ref 133–145)
T WAVE AXIS: -10 DEGREES
T WAVE AXIS: 20 DEGREES
VENTRICULAR RATE: 107 BPM
VENTRICULAR RATE: 89 BPM
WBC # BLD AUTO: 5.35 THOUSAND/UL (ref 4.31–10.16)

## 2021-05-12 PROCEDURE — 93010 ELECTROCARDIOGRAM REPORT: CPT | Performed by: INTERNAL MEDICINE

## 2021-05-12 PROCEDURE — 83735 ASSAY OF MAGNESIUM: CPT | Performed by: INTERNAL MEDICINE

## 2021-05-12 PROCEDURE — 80048 BASIC METABOLIC PNL TOTAL CA: CPT | Performed by: INTERNAL MEDICINE

## 2021-05-12 PROCEDURE — 97530 THERAPEUTIC ACTIVITIES: CPT

## 2021-05-12 PROCEDURE — 97110 THERAPEUTIC EXERCISES: CPT

## 2021-05-12 PROCEDURE — 85025 COMPLETE CBC W/AUTO DIFF WBC: CPT | Performed by: INTERNAL MEDICINE

## 2021-05-12 PROCEDURE — 99232 SBSQ HOSP IP/OBS MODERATE 35: CPT | Performed by: INTERNAL MEDICINE

## 2021-05-12 PROCEDURE — 94760 N-INVAS EAR/PLS OXIMETRY 1: CPT

## 2021-05-12 PROCEDURE — 82330 ASSAY OF CALCIUM: CPT | Performed by: INTERNAL MEDICINE

## 2021-05-12 PROCEDURE — 94668 MNPJ CHEST WALL SBSQ: CPT

## 2021-05-12 PROCEDURE — 99233 SBSQ HOSP IP/OBS HIGH 50: CPT | Performed by: INTERNAL MEDICINE

## 2021-05-12 PROCEDURE — 94640 AIRWAY INHALATION TREATMENT: CPT

## 2021-05-12 PROCEDURE — 94003 VENT MGMT INPAT SUBQ DAY: CPT

## 2021-05-12 PROCEDURE — 82948 REAGENT STRIP/BLOOD GLUCOSE: CPT

## 2021-05-12 RX ORDER — FUROSEMIDE 10 MG/ML
40 INJECTION INTRAMUSCULAR; INTRAVENOUS ONCE
Status: COMPLETED | OUTPATIENT
Start: 2021-05-12 | End: 2021-05-12

## 2021-05-12 RX ORDER — POTASSIUM CHLORIDE 20 MEQ/1
20 TABLET, EXTENDED RELEASE ORAL ONCE
Status: COMPLETED | OUTPATIENT
Start: 2021-05-12 | End: 2021-05-12

## 2021-05-12 RX ORDER — FUROSEMIDE 10 MG/ML
40 INJECTION INTRAMUSCULAR; INTRAVENOUS
Status: DISCONTINUED | OUTPATIENT
Start: 2021-05-13 | End: 2021-05-13

## 2021-05-12 RX ORDER — ONDANSETRON 2 MG/ML
4 INJECTION INTRAMUSCULAR; INTRAVENOUS ONCE
Status: DISCONTINUED | OUTPATIENT
Start: 2021-05-12 | End: 2021-05-12

## 2021-05-12 RX ORDER — ACETAZOLAMIDE 500 MG/5ML
500 INJECTION, POWDER, LYOPHILIZED, FOR SOLUTION INTRAVENOUS ONCE
Status: COMPLETED | OUTPATIENT
Start: 2021-05-12 | End: 2021-05-12

## 2021-05-12 RX ORDER — FUROSEMIDE 10 MG/ML
40 INJECTION INTRAMUSCULAR; INTRAVENOUS
Status: DISCONTINUED | OUTPATIENT
Start: 2021-05-13 | End: 2021-05-12

## 2021-05-12 RX ORDER — METOCLOPRAMIDE HYDROCHLORIDE 5 MG/ML
5 INJECTION INTRAMUSCULAR; INTRAVENOUS ONCE
Status: COMPLETED | OUTPATIENT
Start: 2021-05-12 | End: 2021-05-12

## 2021-05-12 RX ADMIN — SPIRONOLACTONE 50 MG: 25 TABLET, FILM COATED ORAL at 08:25

## 2021-05-12 RX ADMIN — POTASSIUM CHLORIDE 20 MEQ: 1500 TABLET, EXTENDED RELEASE ORAL at 08:25

## 2021-05-12 RX ADMIN — METHOCARBAMOL TABLETS 750 MG: 500 TABLET, COATED ORAL at 14:22

## 2021-05-12 RX ADMIN — BUDESONIDE AND FORMOTEROL FUMARATE DIHYDRATE 2 PUFF: 160; 4.5 AEROSOL RESPIRATORY (INHALATION) at 08:41

## 2021-05-12 RX ADMIN — ACETAMINOPHEN 650 MG: 325 TABLET, FILM COATED ORAL at 08:30

## 2021-05-12 RX ADMIN — Medication 1 TABLET: at 08:24

## 2021-05-12 RX ADMIN — HYDROXYZINE HYDROCHLORIDE 25 MG: 25 TABLET ORAL at 14:23

## 2021-05-12 RX ADMIN — METHOCARBAMOL TABLETS 750 MG: 500 TABLET, COATED ORAL at 21:57

## 2021-05-12 RX ADMIN — DIGOXIN 125 MCG: 125 TABLET ORAL at 08:25

## 2021-05-12 RX ADMIN — BUDESONIDE AND FORMOTEROL FUMARATE DIHYDRATE 2 PUFF: 160; 4.5 AEROSOL RESPIRATORY (INHALATION) at 20:10

## 2021-05-12 RX ADMIN — Medication 5000 UNITS: at 08:24

## 2021-05-12 RX ADMIN — METOCLOPRAMIDE HYDROCHLORIDE 5 MG: 5 INJECTION INTRAMUSCULAR; INTRAVENOUS at 09:57

## 2021-05-12 RX ADMIN — NYSTATIN: 100000 POWDER TOPICAL at 17:34

## 2021-05-12 RX ADMIN — METOPROLOL TARTRATE 25 MG: 25 TABLET, FILM COATED ORAL at 08:24

## 2021-05-12 RX ADMIN — STANDARDIZED SENNA CONCENTRATE 8.6 MG: 8.6 TABLET ORAL at 21:57

## 2021-05-12 RX ADMIN — FLUTICASONE PROPIONATE 1 SPRAY: 50 SPRAY, METERED NASAL at 17:33

## 2021-05-12 RX ADMIN — HYDROXYZINE HYDROCHLORIDE 25 MG: 25 TABLET ORAL at 21:57

## 2021-05-12 RX ADMIN — DOCUSATE SODIUM 100 MG: 100 CAPSULE ORAL at 08:24

## 2021-05-12 RX ADMIN — HYDROXYZINE HYDROCHLORIDE 25 MG: 25 TABLET ORAL at 06:13

## 2021-05-12 RX ADMIN — DABIGATRAN ETEXILATE MESYLATE 150 MG: 150 CAPSULE ORAL at 21:57

## 2021-05-12 RX ADMIN — FLUTICASONE PROPIONATE 1 SPRAY: 50 SPRAY, METERED NASAL at 08:40

## 2021-05-12 RX ADMIN — METHOCARBAMOL TABLETS 750 MG: 500 TABLET, COATED ORAL at 06:13

## 2021-05-12 RX ADMIN — DOCUSATE SODIUM 100 MG: 100 CAPSULE ORAL at 17:24

## 2021-05-12 RX ADMIN — NYSTATIN: 100000 POWDER TOPICAL at 08:40

## 2021-05-12 RX ADMIN — POTASSIUM CHLORIDE 20 MEQ: 1500 TABLET, EXTENDED RELEASE ORAL at 15:34

## 2021-05-12 RX ADMIN — DABIGATRAN ETEXILATE MESYLATE 150 MG: 150 CAPSULE ORAL at 08:25

## 2021-05-12 RX ADMIN — ACETAZOLAMIDE SODIUM 500 MG: 500 INJECTION, POWDER, LYOPHILIZED, FOR SOLUTION INTRAVENOUS at 17:24

## 2021-05-12 RX ADMIN — WHITE PETROLATUM 57.7 %-MINERAL OIL 31.9 % EYE OINTMENT: at 22:06

## 2021-05-12 RX ADMIN — FUROSEMIDE 40 MG: 10 INJECTION, SOLUTION INTRAMUSCULAR; INTRAVENOUS at 06:13

## 2021-05-12 RX ADMIN — METOPROLOL TARTRATE 25 MG: 25 TABLET, FILM COATED ORAL at 21:57

## 2021-05-12 RX ADMIN — POTASSIUM CHLORIDE 20 MEQ: 1500 TABLET, EXTENDED RELEASE ORAL at 17:24

## 2021-05-12 RX ADMIN — FUROSEMIDE 40 MG: 10 INJECTION, SOLUTION INTRAMUSCULAR; INTRAVENOUS at 11:53

## 2021-05-12 RX ADMIN — BUPROPION HYDROCHLORIDE 300 MG: 150 TABLET, EXTENDED RELEASE ORAL at 08:24

## 2021-05-12 NOTE — ASSESSMENT & PLAN NOTE
· H/o DVT and PE, s/p IVC filter insertion in 2019   · Patient was supposed to have the filter removed, but could not, was referred to [facility] but he did not have that done as he switched insurance   · Is on anticoagulation with Pradaxa for Afib     · PLAN:   Continue anticoagulation with Pradaxa

## 2021-05-12 NOTE — PLAN OF CARE
Problem: Potential for Falls  Goal: Patient will remain free of falls  Description: INTERVENTIONS:  - Assess patient frequently for physical needs  -  Identify cognitive and physical deficits and behaviors that affect risk of falls    -  [place] fall precautions as indicated by assessment   - Educate patient/family on patient safety including physical limitations  - Instruct patient to call for assistance with activity based on assessment  - Modify environment to reduce risk of injury  - Consider OT/PT consult to assist with strengthening/mobility  Outcome: Progressing     Problem: Prexisting or High Potential for Compromised Skin Integrity  Goal: Skin integrity is maintained or improved  Description: INTERVENTIONS:  - Identify patients at risk for skin breakdown  - Assess and monitor skin integrity  - Assess and monitor nutrition and hydration status  - Monitor labs   - Assess for incontinence   - Turn and reposition patient  - Assist with mobility/ambulation  - Relieve pressure over bony prominences  - Avoid friction and shearing  - Provide appropriate hygiene as needed including keeping skin clean and dry  - Evaluate need for skin moisturizer/barrier cream  - Collaborate with interdisciplinary team   - Patient/family teaching  - Consider wound care consult   Outcome: Progressing     Problem: PAIN - ADULT  Goal: Verbalizes/displays adequate comfort level or baseline comfort level  Description: Interventions:  - Encourage patient to monitor pain and request assistance  - Assess pain using appropriate pain scale  - Administer analgesics based on type and severity of pain and evaluate response  - Implement non-pharmacological measures as appropriate and evaluate response  - Consider cultural and social influences on pain and pain management  - Notify physician/advanced practitioner if interventions unsuccessful or patient reports new pain  Outcome: Progressing     Problem: INFECTION - ADULT  Goal: Absence or prevention of progression during hospitalization  Description: INTERVENTIONS:  - Assess and monitor for signs and symptoms of infection  - Monitor lab/diagnostic results  - Monitor all insertion sites, i e  indwelling lines, tubes, and drains  - Monitor endotracheal if appropriate and nasal secretions for changes in amount and color  - [place] appropriate cooling/warming therapies per order  - Administer medications as ordered  - Instruct and encourage patient and family to use good hand hygiene technique  - Identify and instruct in appropriate isolation precautions for identified infection/condition  Outcome: Progressing     Problem: SAFETY ADULT  Goal: Patient will remain free of falls  Description: INTERVENTIONS:  - Assess patient frequently for physical needs  -  Identify cognitive and physical deficits and behaviors that affect risk of falls    -  [place] fall precautions as indicated by assessment   - Educate patient/family on patient safety including physical limitations  - Instruct patient to call for assistance with activity based on assessment  - Modify environment to reduce risk of injury  - Consider OT/PT consult to assist with strengthening/mobility  Outcome: Progressing  Goal: Maintain or return to baseline ADL function  Description: INTERVENTIONS:  -  Assess patient's ability to carry out ADLs; assess patient's baseline for ADL function and identify physical deficits which impact ability to perform ADLs (bathing, care of mouth/teeth, toileting, grooming, dressing, etc )  - Assess/evaluate cause of self-care deficits   - Assess range of motion  - Assess patient's mobility; develop plan if impaired  - Assess patient's need for assistive devices and provide as appropriate  - Encourage maximum independence but intervene and supervise when necessary  - Involve family in performance of ADLs  - Assess for home care needs following discharge   - Consider OT consult to assist with ADL evaluation and planning for discharge  - Provide patient education as appropriate  Outcome: Progressing  Goal: Maintain or return mobility status to optimal level  Description: INTERVENTIONS:  - Assess patient's baseline mobility status (ambulation, transfers, stairs, etc )    - Identify cognitive and physical deficits and behaviors that affect mobility  - Identify mobility aids required to assist with transfers and/or ambulation (gait belt, sit-to-stand, lift, walker, cane, etc )  - [place] fall precautions as indicated by assessment  - Record patient progress and toleration of activity level on Mobility SBAR; progress patient to next Phase/Stage  - Instruct patient to call for assistance with activity based on assessment  - Consider rehabilitation consult to assist with strengthening/weightbearing, etc   Outcome: Progressing     Problem: DISCHARGE PLANNING  Goal: Discharge to home or other facility with appropriate resources  Description: INTERVENTIONS:  - Identify barriers to discharge w/patient and caregiver  - Arrange for needed discharge resources and transportation as appropriate  - Identify discharge learning needs (meds, wound care, etc )  - Arrange for interpretive services to assist at discharge as needed  - Refer to Case Management Department for coordinating discharge planning if the patient needs post-hospital services based on physician/advanced practitioner order or complex needs related to functional status, cognitive ability, or social support system  Outcome: Progressing     Problem: Knowledge Deficit  Goal: Patient/family/caregiver demonstrates understanding of disease process, treatment plan, medications, and discharge instructions  Description: Complete learning assessment and assess knowledge base    Interventions:  - Provide teaching at level of understanding  - Provide teaching via preferred learning methods  Outcome: Progressing     Problem: CARDIOVASCULAR - ADULT  Goal: Maintains optimal cardiac output and hemodynamic stability  Description: INTERVENTIONS:  - Monitor I/O, vital signs and rhythm  - Monitor for S/S and trends of decreased cardiac output  - Administer and titrate ordered vasoactive medications to optimize hemodynamic stability  - Assess quality of pulses, skin color and temperature  - Assess for signs of decreased coronary artery perfusion  - Instruct patient to report change in severity of symptoms  Outcome: Progressing  Goal: Absence of cardiac dysrhythmias or at baseline rhythm  Description: INTERVENTIONS:  - Continuous cardiac monitoring, vital signs, obtain 12 lead EKG if ordered  - Administer antiarrhythmic and heart rate control medications as ordered  - Monitor electrolytes and administer replacement therapy as ordered  Outcome: Progressing     Problem: RESPIRATORY - ADULT  Goal: Achieves optimal ventilation and oxygenation  Description: INTERVENTIONS:  - Assess for changes in respiratory status  - Assess for changes in mentation and behavior  - Position to facilitate oxygenation and minimize respiratory effort  - Oxygen administered by appropriate delivery if ordered  - Initiate smoking cessation education as indicated  - Encourage broncho-pulmonary hygiene including cough, deep breathe, Incentive Spirometry  - Assess the need for suctioning and aspirate as needed  - Assess and instruct to report SOB or any respiratory difficulty  - Respiratory Therapy support as indicated  Outcome: Progressing

## 2021-05-12 NOTE — ASSESSMENT & PLAN NOTE
· K/C/O obesity hypoventilation syndrome   · Follows a pulmonologist as outpatient - last seen in March 2021   · Previous medical history is also suggestive of severe FILIPPO ( based on home sleep study ) and severe restrictive pattern on PFT   · Patient is on continuous oxygen supplementation of 2 L   · Was advised during previous office visit to strictly use BiPAP at night for maximum benefit and weight loss --patient reports that he has not been fully compliant with his BiPAP use  · Labs : elevated bicarbonate- 45, is likely compensation for his chronic CO2 retention   · Metabolic alkalosis is worsening due to aggressive diuresis     · VBG - pCO2 - 63, patient is AAO x 3     PLAN:   · Continue BiPAP HS and PRN - respiratory therapy on board

## 2021-05-12 NOTE — CASE MANAGEMENT
LOS 1 DAY  RISK OF UNPLANNED READMISSION SCORE 13  30 DAY READMISSION: NO  BUNDLE: NO    CM s/w patient by phone  Patient states that he lives at the [address]  Patient reports that he requires assistance with all ADLs which is provided by nursing staff at [facility]  Patient reports that he uses a cheyanne-RW at baseline, however in the past couple months has become more weak and mostly bedbound/WC-bound  CM reviewed name, role, discharge planning process, and encouraged follow-up with all recommended appointments and providers after discharge  Patient denying any other needs from this CM at this time  Referral opened to [facility] via [address]      Discharge Plan: Return to the [facility]

## 2021-05-12 NOTE — PROGRESS NOTES
Adela U  67   Progress Note - Karol Hernando 1970, 49 y o  male MRN: [id number]  Unit/Bed#: -01 Encounter: [id number]  Primary Care Provider: Clement Sarmiento MD   Date and time admitted to hospital: 5/10/2021  9:27 AM    * Shortness of breath  Assessment & Plan  · Patient presented with SOB, started few days ago worsened on the morning of admission   · Associated symptoms : chest heaviness, orthopnea, PND, decreased urination with the current dose of lasix, increase in cough and sputum production, palpitations, subjective feeling of "fluid in his body / chest"   · No fever, chills, URTI symptoms, calf swelling / pain above baseline, no wheezing, change in color / consistency / character of sputum   · Vitals : saturating at 99% on baseline oxygen of 2 L, intermittent episodes of tachycardia  Monitor showed Afib with RVR - HR - sometimes increasing to 120s   · Labs : normal troponin, mildly elevated BNP - 125 ( patient is morbidly obese), elevated bicarbonate, normal renal function, no leukocytosis   · Chest CTA : ruled out PE, reported diffuse groundglass opacities -- could represent pulmonary edema or infectious / inflammatory process  · Etiology of SOB likely CHF exacerbation, in the setting of tachyarrhythmia  · Patient received 20 mg IV lasix dose on the day of admission due to soft BP - had a urine output of 850 ml within 1 5 hours  · BP improved - currently 108/ 62  · Patient is supposed to be on a 40 mg PO BID dose at the nursing facility, however is only on 20 mg PO BID as per nursing home medication records   · Had a significant 24 hr urine output of 5 5 L and is neg by almost 4 L  Patient feels subjectively better - reports improvement in SOB  Weights are inaccurate  · Received 2 doses of 40 mg IV lasix yesterday   Bicarb today is worsening to > 44 (45)     PLAN :   · See further management under "acute on chronic diastolic CHF"   · Continue oxygen supplementation via nasal cannula to maintain sats of 88-92%   · Continue home inhaler - Symbicort, albuterol nebs PRN     Obesity hypoventilation syndrome ([facility] )  Assessment & Plan  · K/C/O obesity hypoventilation syndrome   · Follows a pulmonologist as outpatient - last seen in March 2021   · Previous medical history is also suggestive of severe FILIPPO ( based on home sleep study ) and severe restrictive pattern on PFT   · Patient is on continuous oxygen supplementation of 2 L   · Was advised during previous office visit to strictly use BiPAP at night for maximum benefit and weight loss --patient reports that he has not been fully compliant with his BiPAP use  · Labs : elevated bicarbonate- 45, is likely compensation for his chronic CO2 retention   · Metabolic alkalosis is worsening due to aggressive diuresis  · VBG - pCO2 - 63, patient is AAO x 3     PLAN:   · Continue BiPAP HS and PRN - respiratory therapy on board     Morbid obesity ([facility] )  Assessment & Plan  · S/p gastric sleeve surgery in 2018   · Patient used to weigh approximately 650 pounds, lost about 250-260 pounds and weight has been stable since  · As per nursing home staff, patient refuses to be weighed regularly but they do estimate a significant weight gain     · Patient reported to cardiology that he at least gained about 20 lbs   · Patient follows with bariatric surgery - last seen on 04/21     PLAN :   · Continue outpatient management and follow up with bariatric surgery     DVT (deep venous thrombosis) ([facility] )  Assessment & Plan  · H/o DVT and PE, s/p IVC filter insertion in 2019   · Patient was supposed to have the filter removed, but could not, was referred to [facility] but he did not have that done as he switched insurance   · Is on anticoagulation with Pradaxa for Afib     · PLAN:   Continue anticoagulation with Pradaxa     Acute on chronic diastolic congestive heart failure ([facility] )  Assessment & Plan  Wt Readings from Last 3 Encounters:   05/12/21 (!) 253 kg (557 lb 15 8 oz)   06/15/20 (!) 161 kg (355 lb 3 2 oz)   04/18/19 (!) 189 kg (417 lb)     · Followed up with cardiology as outpatient - last seen in Jan 2021   · As per chart review, echo reports were reviewed  Prior ECHO (2017, no recent ECHO available) results indicate poor endocardial definition, but overall normal systolic function   · As per chart review cardiology advised to continue lasix 40 mg PO BID, however patient has been only on 20 mg PO BID at the nursing facility as per nursing home medication records   · S/p 20 mg IV lasix dose at the time of admission - had a urinary output of 850 ml within 1 5 hours of dose administration  20 mg IV dose was chosen as the patient had soft BP ( in the 100s SBP )   · Transthoracic echo performed yesterday--estimated EF - 60 %, study was inadequate for RWMA assessment  Upon personal review by cardiologist, patient seemed to have a good RV function with no dilatation  CHF is likely impaired diastolic function of the LV  · Cardiology on board   · Had a significant 24 hr urine output of 5 5 L and is neg by almost 4 L  Patient feels subjectively better - reports improvement in SOB  Weights are inaccurate  · Received 2 doses of 40 mg IV lasix yesterday  Bicarb today is worsening to > 44 (45)     PLAN :   · As per cardiology, change lasix to 40 mg IV twice daily for today   · Continue diamox 500 mg IV once daily   · Reassess need for IV diuresis tomorrow, will likely transition to PO   Patient will need to be on at least 40 mg PO BID at the nursing home   · Continue fluid restriction, strict I/O charting, daily weights  · Needs to be on a strict low-salt diet at the time of discharge   · Outpatient follow up within 1 week at the cardiology office - will include instructions in discharge paperwork             Atrial fibrillation [facility])  Assessment & Plan  · Patient is a known case of atrial fibrillation, on anticoagulation with pradaxa   · Is also on digoxin 0 125 mg PO daily, and metoprolol tartrate 25 mg PO q12h   · Patient presented with SOB, chest discomfort but no hypoxia   · HR on admission - 109, had intermittent tachycardic episodes in the 110-120s   · Digoxin level - 0 8   · S/p 12 5 mg PO Lopressor x 1 dose at the time of admission due to soft Bps ( SBP - 100s )   · Telemetry overnight -- HR - 100s     PLAN :   · Continue Telemetry monitoring   · As per cardiology, if HR continues to be in the 100s, will likely need an increase in metoprolol dose tomorrow, for now continue 25 mg PO q12h   · Continue digoxin at home dose   · Continue order lopressor IV PRN for HR > 120, with holding parameters   · Keep magnesium > 2, potassium > 4   · Continue pradaxa at home dose for anticoagulation       QT prolongation  Assessment & Plan  · Patient presented with a prolonged QTc of 537 ms  · On telemetry monitoring   · Repeat EKG showed a QTc of 569 ms   · Serum potassium is 3 4, magnesium is 2 1    PLAN :   · Avoid QTc prolonging agents   · Continue telemetry monitoring   · Will order a total of 60 Meq KCl to maintain serum potassium > 4   · Order EKG to assess QTc prolongation     Hypothyroidism  Assessment & Plan  · Patient has a history of hypothyroidism, was on levothyroxine 25 mcg PO daily   · This medication is not his list of medications that he takes at the nursing home   · Last TSH in August 2020 - 1 75   · TSH is normal at 3 2 on 05/10    PLAN :   · At the nursing home, patient is not on levothyroxine-- will continue to hold off     COPD (chronic obstructive pulmonary disease) (HCC)  Assessment & Plan  · Known case of COPD, former smoker, quit about 6 years ago, used to smoke 1 ppd x 20 years   · Is on symbicort at the nursing home   · Reports increased cough with increase  In amount of whitish sputum production, no change in color / consistency   · No wheeze heard in physical exam, however diminished breath sound and difficult to appreciate due to body habitus     · Patient has no mental status abnormalities, is alert and oriented x 3     PLAN :  · Continue symbicort while inpatient   · Continue albuterol nebs PRN       Pulmonary artery aneurysm [facility])  Assessment & Plan  · Pulmonary artery aneurysm detected on CTA   · In comparison to imaging obtained in 2017, size increased from 4 9 cm --> 5 4 cm     PLAN :   · Continue outpatient management         INTERNAL MEDICINE RESIDENCY PROGRESS NOTE     Name: Rosemary Gomes   Age & Sex: 49 y o  male   MRN: [id number]  Unit/Bed#: -01   Encounter: [id number]    PATIENT INFORMATION     Name: Rosemary Gomes   Age & Sex: 49 y o  male   MRN: [id number]  Hospital Stay Days: 1    ASSESSMENT/PLAN     Principal Problem:    Shortness of breath  Active Problems: Morbid obesity (HCC)    Obesity hypoventilation syndrome (HCC)    DVT (deep venous thrombosis) (HCC)    Acute on chronic diastolic congestive heart failure (HCC)    Atrial fibrillation (HCC)    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HCC)    COPD (chronic obstructive pulmonary disease) (HCC)    Hypothyroidism    QT prolongation      * Shortness of breath  Assessment & Plan  · Patient presented with SOB, started few days ago worsened on the morning of admission   · Associated symptoms : chest heaviness, orthopnea, PND, decreased urination with the current dose of lasix, increase in cough and sputum production, palpitations, subjective feeling of "fluid in his body / chest"   · No fever, chills, URTI symptoms, calf swelling / pain above baseline, no wheezing, change in color / consistency / character of sputum   · Vitals : saturating at 99% on baseline oxygen of 2 L, intermittent episodes of tachycardia   Monitor showed Afib with RVR - HR - sometimes increasing to 120s   · Labs : normal troponin, mildly elevated BNP - 125 ( patient is morbidly obese), elevated bicarbonate, normal renal function, no leukocytosis   · Chest CTA : ruled out PE, reported diffuse groundglass opacities -- could represent pulmonary edema or infectious / inflammatory process  · Etiology of SOB likely CHF exacerbation, in the setting of tachyarrhythmia  · Patient received 20 mg IV lasix dose on the day of admission due to soft BP - had a urine output of 850 ml within 1 5 hours  · BP improved - currently 108/ 62  · Patient is supposed to be on a 40 mg PO BID dose at the nursing facility, however is only on 20 mg PO BID as per nursing home medication records   · Had a significant 24 hr urine output of 5 5 L and is neg by almost 4 L  Patient feels subjectively better - reports improvement in SOB  Weights are inaccurate  · Received 2 doses of 40 mg IV lasix yesterday  Bicarb today is worsening to > 44 (45)     PLAN :   · See further management under "acute on chronic diastolic CHF"   · Continue oxygen supplementation via nasal cannula to maintain sats of 88-92%   · Continue home inhaler - Symbicort, albuterol nebs PRN     Obesity hypoventilation syndrome (HCC)  Assessment & Plan  · K/C/O obesity hypoventilation syndrome   · Follows a pulmonologist as outpatient - last seen in March 2021   · Previous medical history is also suggestive of severe FILIPPO ( based on home sleep study ) and severe restrictive pattern on PFT   · Patient is on continuous oxygen supplementation of 2 L   · Was advised during previous office visit to strictly use BiPAP at night for maximum benefit and weight loss --patient reports that he has not been fully compliant with his BiPAP use  · Labs : elevated bicarbonate- 45, is likely compensation for his chronic CO2 retention   · Metabolic alkalosis is worsening due to aggressive diuresis  · VBG - pCO2 - 63, patient is AAO x 3     PLAN:   · Continue BiPAP HS and PRN - respiratory therapy on board     Morbid obesity ([facility] )  Assessment & Plan  · S/p gastric sleeve surgery in 2018   · Patient used to weigh approximately 650 pounds, lost about 250-260 pounds and weight has been stable since     · As per nursing home staff, patient refuses to be weighed regularly but they do estimate a significant weight gain  · Patient reported to cardiology that he at least gained about 20 lbs   · Patient follows with bariatric surgery - last seen on 04/21     PLAN :   · Continue outpatient management and follow up with bariatric surgery     DVT (deep venous thrombosis) ([facility] )  Assessment & Plan  · H/o DVT and PE, s/p IVC filter insertion in 2019   · Patient was supposed to have the filter removed, but could not, was referred to [facility] but he did not have that done as he switched insurance   · Is on anticoagulation with Pradaxa for Afib     · PLAN:   Continue anticoagulation with Pradaxa     Acute on chronic diastolic congestive heart failure ([facility] )  Assessment & Plan  Wt Readings from Last 3 Encounters:   05/12/21 (!) 253 kg (557 lb 15 8 oz)   06/15/20 (!) 161 kg (355 lb 3 2 oz)   04/18/19 (!) 189 kg (417 lb)     · Followed up with cardiology as outpatient - last seen in Jan 2021   · As per chart review, echo reports were reviewed  Prior ECHO (2017, no recent ECHO available) results indicate poor endocardial definition, but overall normal systolic function   · As per chart review cardiology advised to continue lasix 40 mg PO BID, however patient has been only on 20 mg PO BID at the nursing facility as per nursing home medication records   · S/p 20 mg IV lasix dose at the time of admission - had a urinary output of 850 ml within 1 5 hours of dose administration  20 mg IV dose was chosen as the patient had soft BP ( in the 100s SBP )   · Transthoracic echo performed yesterday--estimated EF - 60 %, study was inadequate for RWMA assessment  Upon personal review by cardiologist, patient seemed to have a good RV function with no dilatation  CHF is likely impaired diastolic function of the LV  · Cardiology on board   · Had a significant 24 hr urine output of 5 5 L and is neg by almost 4 L  Patient feels subjectively better - reports improvement in SOB   Weights are inaccurate  · Received 2 doses of 40 mg IV lasix yesterday  Bicarb today is worsening to > 44 (45)     PLAN :   · As per cardiology, change lasix to 40 mg IV twice daily for today   · Continue diamox 500 mg IV once daily   · Reassess need for IV diuresis tomorrow, will likely transition to PO   Patient will need to be on at least 40 mg PO BID at the nursing home   · Continue fluid restriction, strict I/O charting, daily weights  · Needs to be on a strict low-salt diet at the time of discharge   · Outpatient follow up within 1 week at the cardiology office - will include instructions in discharge paperwork             Atrial fibrillation [facility])  Assessment & Plan  · Patient is a known case of atrial fibrillation, on anticoagulation with pradaxa   · Is also on digoxin 0 125 mg PO daily, and metoprolol tartrate 25 mg PO q12h   · Patient presented with SOB, chest discomfort but no hypoxia   · HR on admission - 109, had intermittent tachycardic episodes in the 110-120s   · Digoxin level - 0 8   · S/p 12 5 mg PO Lopressor x 1 dose at the time of admission due to soft Bps ( SBP - 100s )   · Telemetry overnight -- HR - 100s     PLAN :   · Continue Telemetry monitoring   · As per cardiology, if HR continues to be in the 100s, will likely need an increase in metoprolol dose tomorrow, for now continue 25 mg PO q12h   · Continue digoxin at home dose   · Continue order lopressor IV PRN for HR > 120, with holding parameters   · Keep magnesium > 2, potassium > 4   · Continue pradaxa at home dose for anticoagulation       QT prolongation  Assessment & Plan  · Patient presented with a prolonged QTc of 537 ms  · On telemetry monitoring   · Repeat EKG showed a QTc of 569 ms   · Serum potassium is 3 4, magnesium is 2 1    PLAN :   · Avoid QTc prolonging agents   · Continue telemetry monitoring   · Will order a total of 60 Meq KCl to maintain serum potassium > 4   · Order EKG to assess QTc prolongation     Hypothyroidism  Assessment & Plan  · Patient has a history of hypothyroidism, was on levothyroxine 25 mcg PO daily   · This medication is not his list of medications that he takes at the nursing home   · Last TSH in 2020 - 1 75   · TSH is normal at 3 2 on 05/10    PLAN :   · At the nursing home, patient is not on levothyroxine-- will continue to hold off     COPD (chronic obstructive pulmonary disease) (HCC)  Assessment & Plan  · Known case of COPD, former smoker, quit about 6 years ago, used to smoke 1 ppd x 20 years   · Is on symbicort at the nursing home   · Reports increased cough with increase  In amount of whitish sputum production, no change in color / consistency   · No wheeze heard in physical exam, however diminished breath sound and difficult to appreciate due to body habitus  · Patient has no mental status abnormalities, is alert and oriented x 3     PLAN :  · Continue symbicort while inpatient   · Continue albuterol nebs PRN       Pulmonary artery aneurysm [facility])  Assessment & Plan  · Pulmonary artery aneurysm detected on CTA   · In comparison to imaging obtained in 2017, size increased from 4 9 cm --> 5 4 cm     PLAN :   · Continue outpatient management         SUBJECTIVE     Patient seen and examined  No acute events overnight  Patient complained of nausea, but no episodes of vomiting  He was given Reglan, instead of Zofran due to QTc prolongation  Denied any chest pain, reported improvement in SOB  No abdominal pain  No dysuria/ other urinary symptoms       OBJECTIVE     Vitals:    21 0525 21 0621 21 0803 21 0846   BP:  108/67 140/72    BP Location:  Right arm Right arm    Pulse:  91 100    Resp:  20 19    Temp:  97 6 °F (36 4 °C) 97 5 °F (36 4 °C)    TempSrc:  Tympanic Oral    SpO2:  97% 95% 95%   Weight: (!) 253 kg (557 lb 15 8 oz)      Height:          Temperature:   Temp (24hrs), Av 8 °F (36 6 °C), Min:97 5 °F (36 4 °C), Max:98 1 °F (36 7 °C)    Temperature: 97 5 °F (36 4 °C)  Intake & Output:  I/O       05/10 0701 - 05/11 0700 05/11 0701 - 05/12 0700 05/12 0701 - 05/13 0700    P  O   1140     Total Intake(mL/kg)  1140 (4 5)     Urine (mL/kg/hr) 1375 5525 (0 9)     Stool 0      Total Output 1375 5525     Net -1370 -4385            Unmeasured Stool Occurrence 1 x          Weights:   IBW (Ideal Body Weight): 74 15 kg    Body mass index is 78 93 kg/m²  Weight (last 2 days)     Date/Time   Weight    05/12/21 0525   (!) 253 (557 98)    05/11/21 1900   (!) 241 (531 97)    05/10/21 1417   (!) 186 (410)    05/10/21 0926   (!) 186 (410)            Physical Exam  Vitals signs and nursing note reviewed  Constitutional:       General: He is not in acute distress  Appearance: He is well-developed  He is obese  HENT:      Head: Normocephalic and atraumatic  Mouth/Throat:      Mouth: Mucous membranes are moist    Eyes:      General:         Right eye: No discharge  Left eye: No discharge  Conjunctiva/sclera: Conjunctivae normal    Neck:      Musculoskeletal: Neck supple  Cardiovascular:      Rate and Rhythm: Normal rate  Rhythm irregular  Pulses: Normal pulses  Heart sounds: Normal heart sounds  No murmur  Pulmonary:      Effort: Pulmonary effort is normal  No respiratory distress  Comments: diminished breath sounds bilaterally, cannot appreciate wheeze/ crackles however exam is limited due to body habitus   Abdominal:      Palpations: Abdomen is soft  Tenderness: There is no abdominal tenderness  Musculoskeletal:      Right lower leg: Edema present  Left lower leg: Edema present  Skin:     General: Skin is warm and dry  Capillary Refill: Capillary refill takes less than 2 seconds  Neurological:      General: No focal deficit present  Mental Status: He is alert and oriented to person, place, and time  Psychiatric:         Mood and Affect: Mood normal        LABORATORY DATA     Labs: I have personally reviewed pertinent reports    Results from last 7 days   Lab Units 05/12/21  0521 05/11/21  0653 05/10/21  0950   WBC Thousand/uL 5 35 5 39 8 10   HEMOGLOBIN g/dL 13 4 12 7 14 2   HEMATOCRIT % 42 0 39 9 44 3   PLATELETS Thousands/uL 147* 168 198   NEUTROS PCT % 67 69 73   MONOS PCT % 8 7 7      Results from last 7 days   Lab Units 05/12/21  0521 05/11/21  0653 05/10/21  0950   POTASSIUM mmol/L 3 4* 3 8 4 3   CHLORIDE mmol/L 92* 95* 94*   CO2 mmol/L 45* 43* 42*   BUN mg/dL 15 15 18   CREATININE mg/dL 0 84 0 79 0 84   CALCIUM mg/dL 9 3 8 9 9 3   ALK PHOS U/L  --   --  75 5   ALT U/L  --   --  10   AST U/L  --   --  11*     Results from last 7 days   Lab Units 05/12/21  0521 05/11/21  0653 05/10/21  0950   MAGNESIUM mg/dL 2 1 1 9 2 0                  Results from last 7 days   Lab Units 05/10/21  1655 05/10/21  0950   TROPONIN I ng/mL <0 03 <0 03       IMAGING & DIAGNOSTIC TESTING     Radiology Results: I have personally reviewed pertinent reports  Xr Chest 1 View Portable    Result Date: 5/10/2021  Impression: Limited study  Probably no acute cardiopulmonary disease within limitations  Workstation performed: [id number]     Cta Ed Chest Pe Study    Result Date: 5/10/2021  Impression: 1  No pulmonary emboli within study limitations  2   Pulmonary artery aneurysm, now measuring 5 4 cm, previously 4 9 cm  3   Diffuse groundglass, which may represent pulmonary edema  or infectious/inflammatory process  No focal consolidation  Workstation performed: [id number]     Other Diagnostic Testing: I have personally reviewed pertinent reports      ACTIVE MEDICATIONS     Current Facility-Administered Medications   Medication Dose Route Frequency    acetaminophen (TYLENOL) tablet 650 mg  650 mg Oral Q6H PRN    acetaZOLAMIDE (DIAMOX) injection 500 mg  500 mg Intravenous Once    albuterol inhalation solution 2 5 mg  2 5 mg Nebulization Q6H PRN    ammonium lactate (LAC-HYDRIN) 12 % cream   Topical BID PRN    artificial tear (LUBRIFRESH P M ) ophthalmic ointment Both Eyes HS    budesonide-formoterol (SYMBICORT) 160-4 5 mcg/act inhaler 2 puff  2 puff Inhalation BID    buPROPion (WELLBUTRIN XL) 24 hr tablet 300 mg  300 mg Oral Daily    calcium carbonate-vitamin D (OSCAL-D) 500 mg-200 units per tablet 1 tablet  1 tablet Oral Daily With Breakfast    cholecalciferol (VITAMIN D3) tablet 5,000 Units  5,000 Units Oral Daily    dabigatran etexilate (PRADAXA) capsule 150 mg  150 mg Oral Q12H [address]    digoxin (LANOXIN) tablet 125 mcg  125 mcg Oral Daily    docusate sodium (COLACE) capsule 100 mg  100 mg Oral BID    fluticasone (FLONASE) 50 mcg/act nasal spray 1 spray  1 spray Nasal BID    furosemide (LASIX) injection 40 mg  40 mg Intravenous Once    [START ON 5/13/2021] furosemide (LASIX) injection 40 mg  40 mg Intravenous BID (diuretic)    hydrOXYzine HCL (ATARAX) tablet 25 mg  25 mg Oral Q8H [address]    methocarbamol (ROBAXIN) tablet 750 mg  750 mg Oral Q8H [address]    metoprolol (LOPRESSOR) injection 2 5 mg  2 5 mg Intravenous Q6H PRN    metoprolol tartrate (LOPRESSOR) tablet 25 mg  25 mg Oral Q12H MARY    nystatin (MYCOSTATIN) powder   Topical BID    potassium chloride (K-DUR,KLOR-CON) CR tablet 20 mEq  20 mEq Oral BID    potassium chloride (K-DUR,KLOR-CON) CR tablet 20 mEq  20 mEq Oral Once    senna (SENOKOT) tablet 8 6 mg  1 tablet Oral HS    spironolactone (ALDACTONE) tablet 50 mg  50 mg Oral Daily    traZODone (DESYREL) tablet 150 mg  150 mg Oral HS       VTE Pharmacologic Prophylaxis: Reason for no pharmacologic prophylaxis patient is anticoagulated with pradaxa   VTE Mechanical Prophylaxis: sequential compression device    Portions of the record may have been created with voice recognition software  Occasional wrong word or "sound a like" substitutions may have occurred due to the inherent limitations of voice recognition software    Read the chart carefully and recognize, using context, where substitutions have occurred   ==  Omar Scruggs MD  [facility]  Internal Medicine Residency PGY-2

## 2021-05-12 NOTE — ASSESSMENT & PLAN NOTE
Wt Readings from Last 3 Encounters:   05/12/21 (!) 253 kg (557 lb 15 8 oz)   06/15/20 (!) 161 kg (355 lb 3 2 oz)   04/18/19 (!) 189 kg (417 lb)     · Followed up with cardiology as outpatient - last seen in Jan 2021   · As per chart review, echo reports were reviewed  Prior ECHO (2017, no recent ECHO available) results indicate poor endocardial definition, but overall normal systolic function   · As per chart review cardiology advised to continue lasix 40 mg PO BID, however patient has been only on 20 mg PO BID at the nursing facility as per nursing home medication records   · S/p 20 mg IV lasix dose at the time of admission - had a urinary output of 850 ml within 1 5 hours of dose administration  20 mg IV dose was chosen as the patient had soft BP ( in the 100s SBP )   · Transthoracic echo performed yesterday--estimated EF - 60 %, study was inadequate for RWMA assessment  Upon personal review by cardiologist, patient seemed to have a good RV function with no dilatation  CHF is likely impaired diastolic function of the LV  · Cardiology on board   · Had a significant 24 hr urine output of 5 5 L and is neg by almost 4 L  Patient feels subjectively better - reports improvement in SOB  Weights are inaccurate  · Received 2 doses of 40 mg IV lasix yesterday  Bicarb today is worsening to > 44 (45)     PLAN :   · As per cardiology, change lasix to 40 mg IV twice daily for today   · Continue diamox 500 mg IV once daily   · Reassess need for IV diuresis tomorrow, will likely transition to PO   Patient will need to be on at least 40 mg PO BID at the nursing home   · Continue fluid restriction, strict I/O charting, daily weights  · Needs to be on a strict low-salt diet at the time of discharge   · Outpatient follow up within 1 week at the cardiology office - will include instructions in discharge paperwork

## 2021-05-12 NOTE — PHYSICAL THERAPY NOTE
PHYSICAL THERAPY TREATMENT  [id number]    NAME:  Candy Walls  DATE: 05/12/21    AGE:   49 y o  Mrn:   [id number]  Length Of Stay: 1    ADMIT DX:  Shortness of breath [R06 02]  SOB (shortness of breath) [R06 02]  Obesity [E66 9]  COPD exacerbation (HCC) [J44 1]  Chest pain, unspecified type [R07 9]    Past Medical History:   Diagnosis Date    [facility]ib ([facility] )     Anemia     Anxiety     Cancer ([place] [address] )     Cardiac disease     Cellulitis     COPD (chronic obstructive pulmonary disease) ([place] [address] )     Depression     Diabetes mellitus ([place] [address] )     DVT (deep venous thrombosis) (HCC)     GERD (gastroesophageal reflux disease)     HBP (high blood pressure)     Heart failure (HCC)     Hx of blood clots     Hypertension     Hypokalemia     Hypothyroid     Obesity     Psychiatric disorder      Past Surgical History:   Procedure Laterality Date    ABDOMINAL HERNIA REPAIR  05/2019    CHOLECYSTECTOMY      Lap    GASTRECTOMY SLEEVE LAPAROSCOPIC  08/2018    KNEE SURGERY Right     open wound, injury     LEG SURGERY Right 2009       Performed at least 2 patient identifiers during session: Name and ID bracelet       05/12/21 2844   PT Last Visit   PT Visit Date 05/12/21   Note Type   Note Type Treatment   Pain Assessment   Pain Assessment Tool 0-10   Pain Score 5   Pain Location/Orientation Orientation: Bilateral;Location: Knee   Pain Radiating Towards n/a   Pain Onset/Description Onset: Ongoing   Effect of Pain on Daily Activities Limits tolerance of mobility, limits rest and comfort   Patient's Stated Pain Goal No pain   Hospital Pain Intervention(s) Repositioned; Ambulation/increased activity   Multiple Pain Sites No   Restrictions/Precautions   Weight Bearing Precautions Per Order No   Other Precautions Bed Alarm; Fall Risk;O2;Pain  (2L O2 via NV, morbid obesity)   General   Chart Reviewed Yes   Additional Pertinent History Pt admitted 5/10/2021 from [place] at [facility] with c/o SOB, cough and congestion   Response to Previous Treatment Patient reporting fatigue but able to participate  (Soreness following session, resolved at time of session)   Family/Caregiver Present No   Cognition   Overall Cognitive Status WFL   Arousal/Participation Alert   Attention Within functional limits   Orientation Level Oriented X4   Memory Within functional limits   Following Commands Follows all commands and directions without difficulty   Subjective   Subjective "I want to sit up"   Bed Mobility   Supine to Sit 5  Supervision   Additional items Assist x 1;Bedrails   Sit to Supine 5  Supervision   Additional items Assist x 1;Bedrails; Increased time required  (multiple attempts required to attain position)   Additional Comments Pt able to self scoot towards [facility] with bed in trendelenburg, B UE use on bed rails, pushing down through hooklying knees  Transfers   Sit to Stand 2  Maximal assistance   Additional items Assist x 2  (RW, bed high elevated + 1 person for RW support)   Stand to Sit 2  Maximal assistance   Additional items Assist x 1;Bed elevated  (poor control to descend)   Stand pivot Unable to assess   Additional Comments Pt tolerated standing with RW 5 sec x 2 trails, and 6 sec x 1 trial; improve upright posture with trails 2 & 3   Ambulation/Elevation   Gait pattern Not appropriate   Balance   Static Sitting Good   Dynamic Sitting Good   Static Standing Poor   Endurance Deficit   Endurance Deficit Yes   Endurance Deficit Description 3L O2 via NC   Activity Tolerance   Activity Tolerance Patient limited by fatigue;Patient limited by pain  (limited due to body habitus)   Nurse Made Aware FREDIS Holloway in room t/o   Exercises   Heelslides 10 reps;Bilateral  (10 reps per leg in semi-supine)   Hip Flexion 10 reps;Bilateral  (SLR; 10 reps per leg in semi-supine)   Hip Abduction AROM; Bilateral  (HIP IR/ER in semi-supine; 10 reps per side)   Assessment   Prognosis Fair   Problem List Decreased strength;Decreased range of motion;Decreased endurance; Impaired balance;Decreased mobility;Obesity;Pain;Decreased skin integrity   Assessment Pt seen for PT session today to address LE strength, bed mobility and upright tolerance  Pt agreeable and eager to participate in PT session today  Pt performed therex in semi-supine position as listed above  Pt required rest breaks between sets due to LE fatigue  Pt stated he wanted to "save himself" for increased efforts with sit to stand transfers  Pt performed supine to sit at EOB with supervision and use of bed rails and momentum to complete  Pt performed 3 sit to stand trails MAXA x2 with RW + additional assist with holding RW  Pt able to tolerate standing for 5 sec, 5 sec, and 7 sec each  Pt required momentum to standing and required constant VC for appropriate breathing techniques while in standing  Pt verbalizes importance of appropriate breathing techniques with mobility however poor application  Pt fatigued at end of session and left sitting up in bed with SCDs activated and call bell within reach  Pt care passed off to [address]  Pt is extremely motivated and will continue to benefit from skilled PT in the acute care setting  Recommend d/c back to facility with rehabilitation services  Plan for further mobility and strengthening next session, as able  Goals   Patient Goals "to walk like I was before " (pt referencing his ambulation before he had COVID-19 in Dec 2020  STG Expiration Date 05/20/21   Short Term Goal #1 Patient PT goals established in order to address patient self reported goal of "to walk like before"  1  Pt will complete all bed mobility in hospital bed with S, in order to promote increased OOB functional mobility & to facilitate self repositioning for pressure relief  2  Pt will complete all transfers with LRAD and MODA, in order to increase safety with functional mobility  3  Pt will ambulate 10ft with LRAD and MODA in order to increase safety with in room functional mobility   4  Pt will tolerate >4 minutes of functional static stand with B UE support and S, in order to facilitate LE strengthening and improved functional mobility  5  Pt will improve B LE strength to >/= 4-/5 MMT throughout, in order to increase safety with functional mobility and decrease risk of falls  6  Pt will demonstrate understanding and independence with LE strengthening HEP  7  Pt will improve Low Functioning AM-PAC score to >/= 24/24 in order to increase independence with mobility and decrease burden of care  8  Pt will improve Barthel Index score to >/= 45/100 in order to increase independence and decrease risk of falls  9  Pt will improve Elderly Mobility Scale score to >/= 6/20 in order to decrease burden of care and increase independence with functional mobility and ADLs  10  Pt will improve dynamic sitting balance to >/= Fair+ grade in order to promote safety and increased independence with mobility  11  Pt will tolerate >3hrs OOB in upright position, in order to improve muscular endurance and respiratory status  PT Treatment Day 2   Plan   Treatment/Interventions Functional transfer training;LE strengthening/ROM; Therapeutic exercise; Endurance training;Patient/family training;Equipment eval/education; Bed mobility;Gait training; Compensatory technique education;Spoke to nursing;OT   Progress Progressing toward goals   PT Frequency Other (Comment)  (3-5x/wk)   Recommendation   PT Discharge Recommendation Return to facility with rehabilitation services   Equipment Recommended   (Equipment needs TBD pending progress)   AM-PAC Basic Mobility Inpatient   Turning in Bed Without Bedrails 3   Lying on Back to Sitting on Edge of Flat Bed 3   Moving Bed to Chair 1   Standing Up From Chair 1   Walk in Room 1   Climb 3-5 Stairs 1   Basic Mobility Inpatient Raw Score 10   Turning Head Towards Sound 4   Follow Simple Instructions 4   Low Function Basic Mobility Raw Score 18   Low Function Basic Mobility Standardized Score 29 25     The patient's AM-PAC Basic Mobility Inpatient Short Form Raw Score is 18, Standardized Score is 29 25  A standardized score less than 42 9 suggests the patient may benefit from discharge to post-acute rehabilitation services  Please also refer to the recommendation of the physical therapist for safe discharge planning      De Argueta, SPT

## 2021-05-12 NOTE — ASSESSMENT & PLAN NOTE
· Patient presented with a prolonged QTc of 537 ms  · On telemetry monitoring   · Repeat EKG showed a QTc of 569 ms   · Serum potassium is 3 4, magnesium is 2 1    PLAN :   · Avoid QTc prolonging agents   · Continue telemetry monitoring   · Will order a total of 60 Meq KCl to maintain serum potassium > 4   · Order EKG to assess QTc prolongation

## 2021-05-12 NOTE — ASSESSMENT & PLAN NOTE
· Patient presented with SOB, started few days ago worsened on the morning of admission   · Associated symptoms : chest heaviness, orthopnea, PND, decreased urination with the current dose of lasix, increase in cough and sputum production, palpitations, subjective feeling of "fluid in his body / chest"   · No fever, chills, URTI symptoms, calf swelling / pain above baseline, no wheezing, change in color / consistency / character of sputum   · Vitals : saturating at 99% on baseline oxygen of 2 L, intermittent episodes of tachycardia  Monitor showed Afib with RVR - HR - sometimes increasing to 120s   · Labs : normal troponin, mildly elevated BNP - 125 ( patient is morbidly obese), elevated bicarbonate, normal renal function, no leukocytosis   · Chest CTA : ruled out PE, reported diffuse groundglass opacities -- could represent pulmonary edema or infectious / inflammatory process  · Etiology of SOB likely CHF exacerbation, in the setting of tachyarrhythmia  · Patient received 20 mg IV lasix dose on the day of admission due to soft BP - had a urine output of 850 ml within 1 5 hours  · BP improved - currently 108/ 62  · Patient is supposed to be on a 40 mg PO BID dose at the nursing facility, however is only on 20 mg PO BID as per nursing home medication records   · Had a significant 24 hr urine output of 5 5 L and is neg by almost 4 L  Patient feels subjectively better - reports improvement in SOB  Weights are inaccurate  · Received 2 doses of 40 mg IV lasix yesterday   Bicarb today is worsening to > 44 (45)     PLAN :   · See further management under "acute on chronic diastolic CHF"   · Continue oxygen supplementation via nasal cannula to maintain sats of 88-92%   · Continue home inhaler - Symbicort, albuterol nebs PRN

## 2021-05-12 NOTE — PLAN OF CARE
Problem: PHYSICAL THERAPY ADULT  Goal: Performs mobility at highest level of function for planned discharge setting  See evaluation for individualized goals  Description: Treatment/Interventions: Functional transfer training, LE strengthening/ROM, Therapeutic exercise, Endurance training, Equipment eval/education, Bed mobility, Gait training, Compensatory technique education, Spoke to nursing, OT  Equipment Recommended: (Equipment needs TBD pending progress)     See flowsheet documentation for full assessment, interventions and recommendations  Outcome: Progressing  Note: Prognosis: Fair  Problem List: Decreased strength, Decreased range of motion, Decreased endurance, Impaired balance, Decreased mobility, Obesity, Pain, Decreased skin integrity  Assessment: Pt seen for PT session today to address LE strength, bed mobility and upright tolerance  Pt agreeable and eager to participate in PT session today  Pt performed therex in semi-supine position as listed above  Pt required rest breaks between sets due to LE fatigue  Pt stated he wanted to "save himself" for increased efforts with sit to stand transfers  Pt performed supine to sit at EOB with supervision and use of bed rails and momentum to complete  Pt performed 3 sit to stand trails MAXA x2 with RW + additional assist with holding RW  Pt able to tolerate standing for 5 sec, 5 sec, and 7 sec each  Pt required momentum to standing and required constant VC for appropriate breathing techniques while in standing  Pt verbalizes importance of appropriate breathing techniques with mobility however poor application  Pt fatigued at end of session and left sitting up in bed with SCDs activated and call bell within reach  Pt care passed off to [address]  Pt is extremely motivated and will continue to benefit from skilled PT in the acute care setting  Recommend d/c back to facility with rehabilitation services   Plan for further mobility and strengthening next session, as able  PT Discharge Recommendation: Return to facility with rehabilitation services          See flowsheet documentation for full assessment

## 2021-05-12 NOTE — PROGRESS NOTES
Cardiology Progress Note - Marialuisa Bobo 49 y o  male MRN: [id number]    Unit/Bed#: -01 Encounter: [id number]        ASSESSMENT/PLAN:    Principal Problem:    Shortness of breath  Active Problems: Morbid obesity ([address] )    Obesity hypoventilation syndrome (HCC)    Acute on chronic diastolic congestive heart failure (HCC)    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HCC)    DVT (deep venous thrombosis) (HCC)    Atrial fibrillation (HCC)    COPD (chronic obstructive pulmonary disease) (HCC)    Hypothyroidism    QT prolongation    Acute on chronic diastolic heart failure:  Clinically improved  Diuresing very well  Negative balance of over 4 L  Weights are inaccurate  Echo reviewed-technically very difficult study with preserved EF  Diastolic function and LA pressures could not be evaluated  No significant valvular heart disease  Continue IV diuretics and add Diamox  Low-sodium diet discussed  Eventually he will need to be on at least Lasix 40 mg twice a day orally  Compliance with CPAP at night will be beneficial     Morbid obesity:  Calorie restriction  Obesity hypoventilation syndrome:  He will benefit from CPAP overnight  Discussed this with the patient  He does have a CPAP machine but uses it infrequently  Metabolic alkalosis:  Worsened by diuretics  Continue acetazolamide  Check ionized calcium  Supplement potassium  Change Lasix to 40 mg IV twice a day for today and give Diamox 500 mg IV daily as advised by Dr Marcus Vega yesterday-this change is reflected already in the orders  Monitor electrolytes tomorrow  Atrial fibrillation, chronic:  Telemetry reviewed  Overall rate about 100  One ventricular couplet noted  Continue Pradaxa  Continue metoprolol  Possibly increase dose tomorrow if the rate stays still around 100 despite diuresis  Plan of care discussed with the patient and RN at bedside  Reviewed case with primary team     Subjective:   No significant events overnight    Diuresed very well  Negative balance of over 4L  He states he feels much better  He has no dyspnea at rest   No angina reported  Reports some fatigue  States he is making good urine  We again discussed the importance of low-sodium diet  Telemetry Review:  Atrial fibrillation with ventricular rate about 100, 1 ventricular couplet seen    Review of Systems   Constitutional: Positive for fatigue and unexpected weight change  Negative for appetite change  HENT: Negative for hearing loss  Eyes: Negative for visual disturbance  Respiratory: Positive for shortness of breath  Cardiovascular: Positive for leg swelling  Gastrointestinal: Positive for abdominal distention  Endocrine: Positive for polyuria  Musculoskeletal: Positive for arthralgias  Neurological: Negative for syncope  Hematological: Does not bruise/bleed easily  Psychiatric/Behavioral: Negative for behavioral problems           Current Facility-Administered Medications:     acetaminophen (TYLENOL) tablet 650 mg, 650 mg, Oral, Q6H PRN, Sapphire Bernal MD, 650 mg at 05/11/21 0839    acetaZOLAMIDE (DIAMOX) injection 500 mg, 500 mg, Intravenous, Once, Sapphire Bernal MD    albuterol inhalation solution 2 5 mg, 2 5 mg, Nebulization, Q6H PRN, Sapphire Bernal MD, 2 5 mg at 05/11/21 0742    ammonium lactate (LAC-HYDRIN) 12 % cream, , Topical, BID PRN, Renetta Espinoza MD, Given at 05/11/21 1541    artificial tear (LUBRIFRESH P M ) ophthalmic ointment, , Both Eyes, HS, Sapphire Bernal MD, Given at 05/11/21 2149    budesonide-formoterol (SYMBICORT) 160-4 5 mcg/act inhaler 2 puff, 2 puff, Inhalation, BID, Sapphire Bernal MD, 2 puff at 05/11/21 2014    buPROPion (WELLBUTRIN XL) 24 hr tablet 300 mg, 300 mg, Oral, Daily, Sapphire Bernal MD, 300 mg at 05/11/21 5858    calcium carbonate-vitamin D (OSCAL-D) 500 mg-200 units per tablet 1 tablet, 1 tablet, Oral, Daily With Breakfast, Sapphire Bernal MD, 1 tablet at 05/11/21 3216    cholecalciferol (VITAMIN D3) tablet 5,000 Units, 5,000 Units, Oral, Daily, Josh Tanner MD, 5,000 Units at 05/11/21 0832    dabigatran etexilate (PRADAXA) capsule 150 mg, 150 mg, Oral, Q12H [address], Josh Tanner MD, 150 mg at 05/11/21 2145    digoxin (LANOXIN) tablet 125 mcg, 125 mcg, Oral, Daily, Josh Tanner MD, 125 mcg at 05/11/21 0840    docusate sodium (COLACE) capsule 100 mg, 100 mg, Oral, BID, Josh Tanner MD, 100 mg at 05/11/21 1856    fluticasone (FLONASE) 50 mcg/act nasal spray 1 spray, 1 spray, Nasal, BID, Josh Tanner MD, 1 spray at 05/11/21 2149    furosemide (LASIX) injection 40 mg, 40 mg, Intravenous, Once, Josh Tanner MD    [START ON 5/13/2021] furosemide (LASIX) injection 40 mg, 40 mg, Intravenous, TID (diuretic), Josh Tanner MD    hydrOXYzine HCL (ATARAX) tablet 25 mg, 25 mg, Oral, Q8H [address], Josh Tanner MD, 25 mg at 05/12/21 0613    methocarbamol (ROBAXIN) tablet 750 mg, 750 mg, Oral, Q8H [address], Brendan Kilpatrick MD, 750 mg at 05/12/21 0613    metoprolol (LOPRESSOR) injection 2 5 mg, 2 5 mg, Intravenous, Q6H PRN, Josh Tanner MD    metoprolol tartrate (LOPRESSOR) tablet 25 mg, 25 mg, Oral, Q12H [address], Benjy Purcell MD, 25 mg at 05/11/21 2144    nystatin (MYCOSTATIN) powder, , Topical, BID, Rishabh White MD, Given at 05/11/21 2148    potassium chloride (K-DUR,KLOR-CON) CR tablet 20 mEq, 20 mEq, Oral, BID, Josh Tanner MD    senna (SENOKOT) tablet 8 6 mg, 1 tablet, Oral, HS, Pranathi Vemparala, MD, 8 6 mg at 05/11/21 2144    spironolactone (ALDACTONE) tablet 50 mg, 50 mg, Oral, Daily, Josh Tanner MD, 50 mg at 05/11/21 0833    traZODone (DESYREL) tablet 150 mg, 150 mg, Oral, HS, Brendan Kilpatrick MD, 150 mg at 05/11/21 2144     Objective:     Vitals:   Blood pressure 140/72, pulse 100, temperature 97 5 °F (36 4 °C), temperature source Oral, resp   rate 19, height 5' 10 5" (1 791 m), weight (!) 253 kg (557 lb 15 8 oz), SpO2 95 %  Body mass index is 78 93 kg/m²  Orthostatic Blood Pressures      Most Recent Value   Blood Pressure  140/72 filed at 05/12/2021 0803   Patient Position - Orthostatic VS  Lying filed at 05/12/2021 3588         Systolic (85RFK), ELBA:602 , Min:108 , QLK:318     Diastolic (21EKP), OFI:79, Min:58, Max:91      Intake/Output Summary (Last 24 hours) at 5/12/2021 0819  Last data filed at 5/12/2021 0656  Gross per 24 hour   Intake 640 ml   Output 4925 ml   Net -4285 ml     Weight (last 2 days)     Date/Time   Weight    05/12/21 0525   (!) 253 (557 98)    05/11/21 1900   (!) 241 (531 97)    05/10/21 1417   (!) 186 (410)    05/10/21 0926   (!) 186 (410)              Physical Exam  Constitutional:       General: He is not in acute distress  Appearance: He is obese  HENT:      Head: Normocephalic  Eyes:      Conjunctiva/sclera: Conjunctivae normal    Neck:      Musculoskeletal: No neck rigidity  Cardiovascular:      Rate and Rhythm: Rhythm irregularly irregular  Heart sounds: S1 normal and S2 normal  No systolic murmur  Pulmonary:      Breath sounds: Decreased air movement present  Decreased breath sounds present  No wheezing or rales  Abdominal:      General: Bowel sounds are normal  There is distension  Hernia: A hernia is present  Musculoskeletal:         General: Swelling present  Skin:     General: Skin is warm  Neurological:      General: No focal deficit present     Psychiatric:         Mood and Affect: Mood normal            Labs & Results:    Results from last 7 days   Lab Units 05/10/21  1655 05/10/21  0950   TROPONIN I ng/mL <0 03 <0 03     Results from last 7 days   Lab Units 05/12/21  0521 05/11/21  0653 05/10/21  0950   WBC Thousand/uL 5 35 5 39 8 10   HEMOGLOBIN g/dL 13 4 12 7 14 2   HEMATOCRIT % 42 0 39 9 44 3   PLATELETS Thousands/uL 147* 168 198         Results from last 7 days   Lab Units 05/12/21  0521 05/11/21  0653 05/10/21  0950   POTASSIUM mmol/L 3 4* 3 8 4 3   CHLORIDE mmol/L 92* 95* 94*   CO2 mmol/L 45* 43* 42*   BUN mg/dL 15 15 18   CREATININE mg/dL 0 84 0 79 0 84   CALCIUM mg/dL 9 3 8 9 9 3   ALK PHOS U/L  --   --  75 5   ALT U/L  --   --  10   AST U/L  --   --  11*         Results from last 7 days   Lab Units 21  0521 21  0653 05/10/21  0950   MAGNESIUM mg/dL 2 1 1 9 2 0     No results for input(s): NTBNP in the last 72 hours  Cardiac testing:     Results for orders placed during the hospital encounter of 05/10/21   Echo complete with contrast if indicated    Narrative [address] [place] [address]    Transthoracic Echocardiogram  2D, M-mode, Doppler, and Color Doppler    Study date:  11-May-2021    Patient: Emily Silva  MR number: [id number]  Account number: [de-identified]  : 1970  Age: 49 years  Gender: Male  Status: Outpatient  Location: [facility] [place]  Height: 70 in  Weight: 409 2 lb  BP: 121/ 53 mmHg    Indications: Heart failure    Diagnoses: I50 9 - Heart failure, unspecified    Sonographer:  JOSE Ramos  Referring Physician:  Wellington Lau MD  Group:  [facility]  Interpreting Physician:  Gracie Roche MD    SUMMARY    PROCEDURE INFORMATION:  This was a technically difficult study  LEFT VENTRICLE:  Systolic function was normal  Ejection fraction was estimated to be 60 %  This study was inadequate for the evaluation of regional wall motion  HISTORY: PRIOR HISTORY: afib, CHF, morbid obesity, DVT, COPD, DM, hypothyroid, MRSA    PROCEDURE: The study was performed in the [facility] [place]  This was a routine study  The transthoracic approach was used  The study included complete 2D imaging, M-mode, complete spectral Doppler, and color Doppler  The heart rate was 86  bpm, at the start of the study  Images were obtained from the parasternal, apical, subcostal, and suprasternal notch acoustic windows   Echocardiographic views were limited due to restricted patient mobility, poor acoustic window  availability, decreased penetration, and lung interference  This was a technically difficult study  LEFT VENTRICLE: Size was normal  Systolic function was normal  Ejection fraction was estimated to be 60 %  This study was inadequate for the evaluation of regional wall motion  Wall thickness could not be accurately determined  DOPPLER:  The study was not technically sufficient to allow evaluation of LV diastolic function  RIGHT VENTRICLE: Systolic function was normal  Not well visualized  LEFT ATRIUM: Not well visualized  RIGHT ATRIUM: Not well visualized  MITRAL VALVE: Not well visualized  DOPPLER: The transmitral velocity was within the normal range  There was trace regurgitation  AORTIC VALVE: The valve was probably trileaflet  The valve was not well visualized  DOPPLER: Transaortic velocity was within the normal range  There was no evidence for stenosis  TRICUSPID VALVE: Not well visualized  DOPPLER: The transtricuspid velocity was within the normal range  There was trace regurgitation  Pulmonary artery systolic pressure was within the normal range  Estimated peak PA pressure was 27 mmHg  PULMONIC VALVE: Not well visualized  DOPPLER: The transpulmonic velocity was within the normal range  There was trace regurgitation  PERICARDIUM: Not well visualized  AORTA: Not well visualized      SYSTEM MEASUREMENT TABLES    2D  Ao Diam: 3 11 cm  LA Diam: 4 34 cm    CW  AV Vmax: 1 22 m/s  AV maxP 91 mmHg  PV Vmax: 1 13 m/s  PV maxP 13 mmHg  TR Vmax: 2 72 m/s  TR maxP 6 mmHg    [employer] Accredited Echocardiography Laboratory    Prepared and electronically signed by    Carlos Munoz MD  Signed 11-May-2021 11:37:20

## 2021-05-12 NOTE — PLAN OF CARE
Problem: Potential for Falls  Goal: Patient will remain free of falls  Description: INTERVENTIONS:  - Assess patient frequently for physical needs  -  Identify cognitive and physical deficits and behaviors that affect risk of falls    -  [place] fall precautions as indicated by assessment   - Educate patient/family on patient safety including physical limitations  - Instruct patient to call for assistance with activity based on assessment  - Consider OT/PT consult to assist with strengthening/mobility  Outcome: Progressing     Problem: Prexisting or High Potential for Compromised Skin Integrity  Goal: Skin integrity is maintained or improved  Description: INTERVENTIONS:  - Identify patients at risk for skin breakdown  - Assess and monitor skin integrity  - Assess and monitor nutrition and hydration status  - Monitor labs   - Assess for incontinence   - Turn and reposition patient  - Assist with mobility/ambulation  - Relieve pressure over bony prominences  - Avoid friction and shearing  - Provide appropriate hygiene as needed including keeping skin clean and dry  - Evaluate need for skin moisturizer/barrier cream  - Collaborate with interdisciplinary team   - Patient/family teaching  - Consider wound care consult   Outcome: Progressing     Problem: PAIN - ADULT  Goal: Verbalizes/displays adequate comfort level or baseline comfort level  Description: Interventions:  - Encourage patient to monitor pain and request assistance  - Assess pain using appropriate pain scale  - Administer analgesics based on type and severity of pain and evaluate response  - Implement non-pharmacological measures as appropriate and evaluate response  - Consider cultural and social influences on pain and pain management  - Notify physician/advanced practitioner if interventions unsuccessful or patient reports new pain  Outcome: Progressing     Problem: INFECTION - ADULT  Goal: Absence or prevention of progression during hospitalization  Description: INTERVENTIONS:  - Assess and monitor for signs and symptoms of infection  - Monitor lab/diagnostic results  - Monitor all insertion sites, i e  indwelling lines, tubes, and drains  - Monitor endotracheal if appropriate and nasal secretions for changes in amount and color  - [place] appropriate cooling/warming therapies per order  - Administer medications as ordered  - Instruct and encourage patient and family to use good hand hygiene technique  - Identify and instruct in appropriate isolation precautions for identified infection/condition  Outcome: Progressing     Problem: SAFETY ADULT  Goal: Patient will remain free of falls  Description: INTERVENTIONS:  - Assess patient frequently for physical needs  -  Identify cognitive and physical deficits and behaviors that affect risk of falls    -  [place] fall precautions as indicated by assessment   - Educate patient/family on patient safety including physical limitations  - Instruct patient to call for assistance with activity based on assessment  - Modify environment to reduce risk of injury  - Consider OT/PT consult to assist with strengthening/mobility  Outcome: Progressing     Problem: DISCHARGE PLANNING  Goal: Discharge to home or other facility with appropriate resources  Description: INTERVENTIONS:  - Identify barriers to discharge w/patient and caregiver  - Arrange for needed discharge resources and transportation as appropriate  - Identify discharge learning needs (meds, wound care, etc )  - Arrange for interpretive services to assist at discharge as needed  - Refer to Case Management Department for coordinating discharge planning if the patient needs post-hospital services based on physician/advanced practitioner order or complex needs related to functional status, cognitive ability, or social support system  Outcome: Progressing     Problem: Knowledge Deficit  Goal: Patient/family/caregiver demonstrates understanding of disease process, treatment plan, medications, and discharge instructions  Description: Complete learning assessment and assess knowledge base    Interventions:  - Provide teaching at level of understanding  - Provide teaching via preferred learning methods  Outcome: Progressing     Problem: CARDIOVASCULAR - ADULT  Goal: Maintains optimal cardiac output and hemodynamic stability  Description: INTERVENTIONS:  - Monitor I/O, vital signs and rhythm  - Monitor for S/S and trends of decreased cardiac output  - Administer and titrate ordered vasoactive medications to optimize hemodynamic stability  - Assess quality of pulses, skin color and temperature  - Assess for signs of decreased coronary artery perfusion  - Instruct patient to report change in severity of symptoms  Outcome: Progressing

## 2021-05-12 NOTE — MALNUTRITION/BMI
This medical record reflects one or more clinical indicators suggestive of malnutrition and/or morbid obesity  Malnutrition Findings:              BMI Findings:  Adult BMI Classifications: Morbid Obesity > 70     Body mass index is 78 93 kg/m²  See Nutrition note dated 5/12/2021 for additional details  Completed nutrition assessment is viewable in the nutrition documentation

## 2021-05-12 NOTE — UTILIZATION REVIEW
Continued Stay Review    Date: 5/12/21 Wednesday                   Current Patient Class: INPATIENT   Current Level of Care: MED SURG    HPI:  50 y/o male with a PMHx morbid obesity s/p gastric sleeve surgery in 2018,  HTN,  COPD/ FILIPPO/  hypoventilation syndrome, chronic hypoxic hypercapneic respiratory failure - on 2 L oxygen, atrial fibrillation on Pradaxa, right sided CHF, chronic lower extremity edema, DVT/ PE, GERD,  anxiety/ depression; recent COVID Infx Dec 2020  Joseph Dutton Presented via EMS to [facility] ED from [address] [place] [address] 2nd few day history of  SOB that worsened this morning and felt like "a heavy weight on his chest   Placed in Observation 5/10/21 at 694 1854 1744 and converted to Inpatient 5/11/21 at 1817 2nd Shortness of Breath 2nd Acute on Chronic Diastolic CHF - Per Cardiology requires IV Lasix q8hrs     5/12/21 Cardiology:  Acute on chronic diastolic heart failure:  Clinically improved  Diuresing very well  Negative balance of over 4 L  Weights are inaccurate  Echo reviewed-technically very difficult study with preserved EF  Diastolic function and LA pressures could not be evaluated  No significant valvular heart disease  Continue IV diuretics and add Diamox  Low-sodium diet discussed  Eventually he will need to be on at least Lasix 40 mg twice a day orally  Compliance with CPAP at night will be beneficial     Vital Signs:   Blood pressure 140/72, pulse 100, temperature 97 5 °F (36 4 °C), temperature source Oral, resp  rate 19,   SpO2 95 %  height 5' 10 5" (1 791 m), weight (!) 253 kg (557 lb 15 8 oz),   Body mass index is 78 93 kg/m²      Orthostatic Blood Pressures      Most Recent Value   Blood Pressure  140/72 filed at 05/12/2021 0803   Patient Position - Orthostatic VS  Lying filed at 94/81/7681 0809      Systolic (73FTT), GDA:618 , Min:108 , MQY:261    Diastolic (14JKH), XRT:64, Min:58, Max:91      Intake/Output Summary (Last 24 hours) at 5/12/2021 0819:  Gross per 24 hour   Intake 640 ml   Output 4925 ml   Net -4285 ml      Weight (last 2 days)      Date/Time   Weight     05/12/21 0525    (!) 253 (557 98)     05/11/21 1900    (!) 241 (531 97)     05/10/21 1417    (!) 186 (410)     05/10/21 0926    (!) 186 (410)     Pertinent Labs/Diagnostic Results:   Results from last 7 days   Lab Units 05/10/21  0950   SARS-COV-2  Negative     Results from last 7 days   Lab Units 05/12/21  0521 05/11/21  0653 05/10/21  0950   WBC Thousand/uL 5 35 5 39 8 10   HEMOGLOBIN g/dL 13 4 12 7 14 2   HEMATOCRIT % 42 0 39 9 44 3   PLATELETS Thousands/uL 147* 168 198   NEUTROS ABS Thousands/µL 3 54 3 66 5 93     Results from last 7 days   Lab Units 05/12/21  1057 05/12/21  0521 05/11/21  0653 05/10/21  0950   SODIUM mmol/L  --  141 141 141   POTASSIUM mmol/L  --  3 4* 3 8 4 3   CHLORIDE mmol/L  --  92* 95* 94*   CO2 mmol/L  --  45* 43* 42*   ANION GAP mmol/L  --  4 3* 5   BUN mg/dL  --  15 15 18   CREATININE mg/dL  --  0 84 0 79 0 84   EGFR ml/min/1 73sq m  --  102 105 102   CALCIUM mg/dL  --  9 3 8 9 9 3   CALCIUM, IONIZED mmol/L 1 15  --   --   --    MAGNESIUM mg/dL  --  2 1 1 9 2 0     Results from last 7 days   Lab Units 05/10/21  0950   AST U/L 11*   ALT U/L 10   ALK PHOS U/L 75 5   TOTAL PROTEIN g/dL 7 6   ALBUMIN g/dL 4 1   TOTAL BILIRUBIN mg/dL 0 78     Results from last 7 days   Lab Units 05/12/21  1053 05/11/21  1550   POC GLUCOSE mg/dl 123 108     Results from last 7 days   Lab Units 05/12/21  0521 05/11/21  0653 05/10/21  0950   GLUCOSE RANDOM mg/dL 102 104 99     Results from last 7 days   Lab Units 05/11/21  1156   PH TRACI  7 395   PCO2 TRACI mm Hg 63 3*   PO2 TRACI mm Hg 64 9*   HCO3 TRACI mmol/L 37 9*   BASE EXC TRACI mmol/L 10 6   O2 CONTENT TRACI ml/dL 16 7   O2 HGB, VENOUS % 89 8*       Results from last 7 days   Lab Units 05/10/21  1655 05/10/21  0950   TROPONIN I ng/mL <0 03 <0 03     Results from last 7 days   Lab Units 05/10/21  0950   BNP pg/mL 125 1*     Diet Cardiovascular; Cardiac; Fluid Restriction 1500 ML Scheduled Medications:  acetaZOLAMIDE, 500 mg, Intravenous, Once  artificial tear, , Both Eyes, HS  budesonide-formoterol, 2 puff, Inhalation, BID  buPROPion, 300 mg, Oral, Daily  calcium carbonate-vitamin D, 1 tablet, Oral, Daily With Breakfast  cholecalciferol, 5,000 Units, Oral, Daily  dabigatran etexilate, 150 mg, Oral, Q12H [address]  digoxin, 125 mcg, Oral, Daily  docusate sodium, 100 mg, Oral, BID  fluticasone, 1 spray, Nasal, BID  IV furosemide (LASIX) injection 40 mg, Intravenous TID - STARTED 5/11/21  [START ON 5/13/2021] furosemide, 40 mg, Intravenous, BID (diuretic)  hydrOXYzine HCL, 25 mg, Oral, Q8H MARY  methocarbamol, 750 mg, Oral, Q8H AMRY  metoprolol tartrate, 25 mg, Oral, Q12H MARY  nystatin, , Topical, BID  potassium chloride, 20 mEq, Oral, BID  potassium chloride, 20 mEq, Oral, Once  senna, 1 tablet, Oral, HS  spironolactone, 50 mg, Oral, Daily  traZODone, 150 mg, Oral, HS     PRN Meds:  acetaminophen, 650 mg, Oral, Q6H PRN GIVEN X 1/ 24 HRS  albuterol, 2 5 mg, Nebulization, Q6H PRN  ammonium lactate, , Topical, BID PRN GIVEN X 1/ 24 HRS  metoprolol, 2 5 mg, Intravenous, Q6H PRN    Discharge Plan: To be determined   Inpatient Case Management following for all discharge needs    Network Utilization Review Department  ATTENTION: Please call with any questions or concerns to [phone] and carefully listen to the prompts so that you are directed to the right person  All voicemails are confidential   Julissa Skipper all requests for admission clinical reviews, approved or denied determinations and any other requests to dedicated fax number below belonging to the campus where the patient is receiving treatment   List of dedicated fax numbers for the Facilities:  [address] DENIALS (Administrative/Medical Necessity) [phone]   [address] [place] [address] (Maternity/NICU/Pediatrics) [phone]   [address] [address] [facility] [address] [address] [place] [address] [address] [place], [place] [address] [phone]

## 2021-05-12 NOTE — ASSESSMENT & PLAN NOTE
· Patient is a known case of atrial fibrillation, on anticoagulation with pradaxa   · Is also on digoxin 0 125 mg PO daily, and metoprolol tartrate 25 mg PO q12h   · Patient presented with SOB, chest discomfort but no hypoxia   · HR on admission - 109, had intermittent tachycardic episodes in the 110-120s   · Digoxin level - 0 8   · S/p 12 5 mg PO Lopressor x 1 dose at the time of admission due to soft Bps ( SBP - 100s )   · Telemetry overnight -- HR - 100s     PLAN :   · Continue Telemetry monitoring   · As per cardiology, if HR continues to be in the 100s, will likely need an increase in metoprolol dose tomorrow, for now continue 25 mg PO q12h   · Continue digoxin at home dose   · Continue order lopressor IV PRN for HR > 120, with holding parameters   · Keep magnesium > 2, potassium > 4   · Continue pradaxa at home dose for anticoagulation

## 2021-05-13 LAB
ANION GAP SERPL CALCULATED.3IONS-SCNC: 7 MMOL/L (ref 4–13)
ATRIAL RATE: 99 BPM
BASOPHILS # BLD AUTO: 0.02 THOUSANDS/ΜL (ref 0–0.1)
BASOPHILS NFR BLD AUTO: 0 % (ref 0–1)
BUN SERPL-MCNC: 16 MG/DL (ref 6–20)
CALCIUM SERPL-MCNC: 9.5 MG/DL (ref 8.4–10.2)
CHLORIDE SERPL-SCNC: 94 MMOL/L (ref 96–108)
CO2 SERPL-SCNC: 36 MMOL/L (ref 22–33)
CREAT SERPL-MCNC: 0.87 MG/DL (ref 0.5–1.2)
EOSINOPHIL # BLD AUTO: 0.12 THOUSAND/ΜL (ref 0–0.61)
EOSINOPHIL NFR BLD AUTO: 2 % (ref 0–6)
ERYTHROCYTE [DISTWIDTH] IN BLOOD BY AUTOMATED COUNT: 12.9 % (ref 11.6–15.1)
GFR SERPL CREATININE-BSD FRML MDRD: 101 ML/MIN/1.73SQ M
GLUCOSE SERPL-MCNC: 102 MG/DL (ref 65–140)
HCT VFR BLD AUTO: 43.1 % (ref 36.5–49.3)
HGB BLD-MCNC: 13.8 G/DL (ref 12–17)
IMM GRANULOCYTES # BLD AUTO: 0 THOUSAND/UL (ref 0–0.2)
IMM GRANULOCYTES NFR BLD AUTO: 0 % (ref 0–2)
LYMPHOCYTES # BLD AUTO: 1.42 THOUSANDS/ΜL (ref 0.6–4.47)
LYMPHOCYTES NFR BLD AUTO: 23 % (ref 14–44)
MAGNESIUM SERPL-MCNC: 2.2 MG/DL (ref 1.6–2.6)
MCH RBC QN AUTO: 30.2 PG (ref 26.8–34.3)
MCHC RBC AUTO-ENTMCNC: 32 G/DL (ref 31.4–37.4)
MCV RBC AUTO: 94 FL (ref 82–98)
MONOCYTES # BLD AUTO: 0.57 THOUSAND/ΜL (ref 0.17–1.22)
MONOCYTES NFR BLD AUTO: 9 % (ref 4–12)
NEUTROPHILS # BLD AUTO: 3.99 THOUSANDS/ΜL (ref 1.85–7.62)
NEUTS SEG NFR BLD AUTO: 66 % (ref 43–75)
PLATELET # BLD AUTO: 169 THOUSANDS/UL (ref 149–390)
PMV BLD AUTO: 10.7 FL (ref 8.9–12.7)
POTASSIUM SERPL-SCNC: 3.4 MMOL/L (ref 3.5–5)
PR INTERVAL: 186 MS
QRS AXIS: 5 DEGREES
QRSD INTERVAL: 95 MS
QT INTERVAL: 414 MS
QTC INTERVAL: 513 MS
RBC # BLD AUTO: 4.57 MILLION/UL (ref 3.88–5.62)
SODIUM SERPL-SCNC: 137 MMOL/L (ref 133–145)
T WAVE AXIS: -18 DEGREES
VENTRICULAR RATE: 92 BPM
WBC # BLD AUTO: 6.12 THOUSAND/UL (ref 4.31–10.16)

## 2021-05-13 PROCEDURE — 99232 SBSQ HOSP IP/OBS MODERATE 35: CPT | Performed by: INTERNAL MEDICINE

## 2021-05-13 PROCEDURE — 94760 N-INVAS EAR/PLS OXIMETRY 1: CPT

## 2021-05-13 PROCEDURE — 94660 CPAP INITIATION&MGMT: CPT

## 2021-05-13 PROCEDURE — 83735 ASSAY OF MAGNESIUM: CPT | Performed by: INTERNAL MEDICINE

## 2021-05-13 PROCEDURE — 80048 BASIC METABOLIC PNL TOTAL CA: CPT | Performed by: INTERNAL MEDICINE

## 2021-05-13 PROCEDURE — 94640 AIRWAY INHALATION TREATMENT: CPT

## 2021-05-13 PROCEDURE — 94003 VENT MGMT INPAT SUBQ DAY: CPT

## 2021-05-13 PROCEDURE — 93010 ELECTROCARDIOGRAM REPORT: CPT | Performed by: INTERNAL MEDICINE

## 2021-05-13 PROCEDURE — 93005 ELECTROCARDIOGRAM TRACING: CPT

## 2021-05-13 PROCEDURE — 94668 MNPJ CHEST WALL SBSQ: CPT

## 2021-05-13 PROCEDURE — 99233 SBSQ HOSP IP/OBS HIGH 50: CPT | Performed by: INTERNAL MEDICINE

## 2021-05-13 PROCEDURE — 85025 COMPLETE CBC W/AUTO DIFF WBC: CPT | Performed by: INTERNAL MEDICINE

## 2021-05-13 RX ORDER — ACETAZOLAMIDE 500 MG/5ML
250 INJECTION, POWDER, LYOPHILIZED, FOR SOLUTION INTRAVENOUS ONCE
Status: COMPLETED | OUTPATIENT
Start: 2021-05-13 | End: 2021-05-13

## 2021-05-13 RX ORDER — FUROSEMIDE 10 MG/ML
40 INJECTION INTRAMUSCULAR; INTRAVENOUS
Status: DISCONTINUED | OUTPATIENT
Start: 2021-05-13 | End: 2021-05-16 | Stop reason: HOSPADM

## 2021-05-13 RX ORDER — POTASSIUM CHLORIDE 20 MEQ/1
40 TABLET, EXTENDED RELEASE ORAL ONCE
Status: COMPLETED | OUTPATIENT
Start: 2021-05-13 | End: 2021-05-13

## 2021-05-13 RX ADMIN — DABIGATRAN ETEXILATE MESYLATE 150 MG: 150 CAPSULE ORAL at 22:05

## 2021-05-13 RX ADMIN — BUDESONIDE AND FORMOTEROL FUMARATE DIHYDRATE 2 PUFF: 160; 4.5 AEROSOL RESPIRATORY (INHALATION) at 20:05

## 2021-05-13 RX ADMIN — POTASSIUM CHLORIDE 40 MEQ: 1500 TABLET, EXTENDED RELEASE ORAL at 08:56

## 2021-05-13 RX ADMIN — HYDROXYZINE HYDROCHLORIDE 25 MG: 25 TABLET ORAL at 22:02

## 2021-05-13 RX ADMIN — FUROSEMIDE 40 MG: 10 INJECTION, SOLUTION INTRAMUSCULAR; INTRAVENOUS at 17:12

## 2021-05-13 RX ADMIN — HYDROXYZINE HYDROCHLORIDE 25 MG: 25 TABLET ORAL at 05:02

## 2021-05-13 RX ADMIN — METOPROLOL TARTRATE 25 MG: 25 TABLET, FILM COATED ORAL at 22:01

## 2021-05-13 RX ADMIN — METHOCARBAMOL TABLETS 750 MG: 500 TABLET, COATED ORAL at 14:24

## 2021-05-13 RX ADMIN — WHITE PETROLATUM 57.7 %-MINERAL OIL 31.9 % EYE OINTMENT: at 22:07

## 2021-05-13 RX ADMIN — SPIRONOLACTONE 50 MG: 25 TABLET, FILM COATED ORAL at 08:55

## 2021-05-13 RX ADMIN — DOCUSATE SODIUM 100 MG: 100 CAPSULE ORAL at 08:55

## 2021-05-13 RX ADMIN — ACETAZOLAMIDE 250 MG: 500 INJECTION, POWDER, LYOPHILIZED, FOR SOLUTION INTRAVENOUS at 10:27

## 2021-05-13 RX ADMIN — DOCUSATE SODIUM 100 MG: 100 CAPSULE ORAL at 18:24

## 2021-05-13 RX ADMIN — METOPROLOL TARTRATE 25 MG: 25 TABLET, FILM COATED ORAL at 08:55

## 2021-05-13 RX ADMIN — METHOCARBAMOL TABLETS 750 MG: 500 TABLET, COATED ORAL at 22:03

## 2021-05-13 RX ADMIN — BUPROPION HYDROCHLORIDE 300 MG: 150 TABLET, EXTENDED RELEASE ORAL at 08:55

## 2021-05-13 RX ADMIN — POTASSIUM CHLORIDE 20 MEQ: 1500 TABLET, EXTENDED RELEASE ORAL at 08:56

## 2021-05-13 RX ADMIN — BUDESONIDE AND FORMOTEROL FUMARATE DIHYDRATE 2 PUFF: 160; 4.5 AEROSOL RESPIRATORY (INHALATION) at 07:49

## 2021-05-13 RX ADMIN — ACETAMINOPHEN 650 MG: 325 TABLET, FILM COATED ORAL at 08:59

## 2021-05-13 RX ADMIN — FLUTICASONE PROPIONATE 1 SPRAY: 50 SPRAY, METERED NASAL at 18:26

## 2021-05-13 RX ADMIN — HYDROXYZINE HYDROCHLORIDE 25 MG: 25 TABLET ORAL at 14:24

## 2021-05-13 RX ADMIN — POTASSIUM CHLORIDE 20 MEQ: 1500 TABLET, EXTENDED RELEASE ORAL at 18:24

## 2021-05-13 RX ADMIN — STANDARDIZED SENNA CONCENTRATE 8.6 MG: 8.6 TABLET ORAL at 22:02

## 2021-05-13 RX ADMIN — NYSTATIN: 100000 POWDER TOPICAL at 10:28

## 2021-05-13 RX ADMIN — Medication 1 TABLET: at 08:55

## 2021-05-13 RX ADMIN — DABIGATRAN ETEXILATE MESYLATE 150 MG: 150 CAPSULE ORAL at 08:55

## 2021-05-13 RX ADMIN — FLUTICASONE PROPIONATE 1 SPRAY: 50 SPRAY, METERED NASAL at 10:28

## 2021-05-13 RX ADMIN — Medication 5000 UNITS: at 08:56

## 2021-05-13 RX ADMIN — FUROSEMIDE 40 MG: 10 INJECTION, SOLUTION INTRAMUSCULAR; INTRAVENOUS at 08:55

## 2021-05-13 RX ADMIN — DIGOXIN 125 MCG: 125 TABLET ORAL at 08:55

## 2021-05-13 NOTE — UTILIZATION REVIEW
Continued Stay Review    Date: 5/13/2021                          Current Patient Class:  Inpatient   Current Level of Care:  Med Surg     HPI:51 y o  male initially admitted on 5/10/2021 from his SNF with a 2 day  History of SOB worsening due ot acute on chronic diastolic CHF, requiring iv, diuretics  Assessment/Plan: 5/13/2021 - Pt with acute on chronic CHF, he has significant diuretic response  Plan one additional dose of diamox 250 mg iv, and continue Lasix iv at the current dosing  Replete K to >4, Stable SHARIF, LE edema  He had some hypotensive episodes on 5/12 to a low of 98/49  Continue to monitor BP,   IV diuretics       Vital Signs:  Date/Time  Temp  Pulse  Resp  BP  MAP (mmHg)  SpO2  FiO2 (%)  Calculated FIO2 (%) - Nasal Cannula  Nasal Cannula O2 Flow Rate (L/min)  O2 Device  O2 Interface Device  Patient Position - Orthostatic VS   05/13/21 1130  98 1 °F (36 7 °C)  85  19  90/67  75  96 %  --  --  --  --  --  Lying   05/13/21 0752  --  --  --  --  --  93 %  --  --  --  --  --  --   05/13/21 0729  96 5 °F (35 8 °C)Abnormal   89  20  128/74  80  95 %  --  --  --  --  --  Lying   05/13/21 0547  97 6 °F (36 4 °C)  91  20  98/49Abnormal   --  98 %  --  --  --  BiPAP  --  Lying   05/13/21 0500  97 8 °F (36 6 °C)  102  20  102/48Abnormal   --  95 %  --  28  2 L/min  Nasal cannula  --  Lying   05/13/21 0249  --  --  --  --  --  --  --  --  --  --  Full face mask  --   05/12/21 2247  --  --  --  --  --  --  --  --  --  --  Full face mask  --   05/12/21 2157  97 8 °F (36 6 °C)  103  20  100/46Abnormal   --  95 %  --  28  2 L/min  Nasal cannula  --  Lying   05/12/21 2100  --  --  --  --  --  --  35  --  --  --  --  --   05/12/21 2012  --  --  --  --  --  95 %  --  28  2 L/min  Nasal cannula  --  --   05/12/21 1436  --  --  --  --  --  --  --  28  2 L/min  Nasal cannula  --  --   05/12/21 1421  97 5 °F (36 4 °C)  60  19  119/48Abnormal   --  96 %  --  --  --  --  --  Lying   05/12/21 1100  98 1 °F (36 7 °C)  93 20  110/76  89  94 %  --  --  --  Nasal cannula  --  Lying   05/12/21 0900  97 5 °F (36 4 °C)  --  --  --  --  96 %  35  28  2 L/min  Nasal cannula  --  --   05/12/21 0846  --  --  --  --  --  95 %  --  28  2 L/min  Nasal cannula  --  --   05/12/21 0803  97 5 °F (36 4 °C)  100  19  140/72  99  95 %  --  --  --  --  --  Lying   05/12/21 0621  97 6 °F (36 4 °C)  91  20  108/67  --  97 %  --  28  2 L/min  Nasal cannula  --  Lying         Pertinent Labs/Diagnostic Results:   Results from last 7 days   Lab Units 05/10/21  0950   SARS-COV-2  Negative     Results from last 7 days   Lab Units 05/13/21  0453 05/12/21  0521 05/11/21  0653 05/10/21  0950   WBC Thousand/uL 6 12 5 35 5 39 8 10   HEMOGLOBIN g/dL 13 8 13 4 12 7 14 2   HEMATOCRIT % 43 1 42 0 39 9 44 3   PLATELETS Thousands/uL 169 147* 168 198   NEUTROS ABS Thousands/µL 3 99 3 54 3 66 5 93         Results from last 7 days   Lab Units 05/13/21  0453 05/12/21  1057 05/12/21  0521 05/11/21  0653 05/10/21  0950   SODIUM mmol/L 137  --  141 141 141   POTASSIUM mmol/L 3 4*  --  3 4* 3 8 4 3   CHLORIDE mmol/L 94*  --  92* 95* 94*   CO2 mmol/L 36*  --  45* 43* 42*   ANION GAP mmol/L 7  --  4 3* 5   BUN mg/dL 16  --  15 15 18   CREATININE mg/dL 0 87  --  0 84 0 79 0 84   EGFR ml/min/1 73sq m 101  --  102 105 102   CALCIUM mg/dL 9 5  --  9 3 8 9 9 3   CALCIUM, IONIZED mmol/L  --  1 15  --   --   --    MAGNESIUM mg/dL 2 2  --  2 1 1 9 2 0     Results from last 7 days   Lab Units 05/10/21  0950   AST U/L 11*   ALT U/L 10   ALK PHOS U/L 75 5   TOTAL PROTEIN g/dL 7 6   ALBUMIN g/dL 4 1   TOTAL BILIRUBIN mg/dL 0 78     Results from last 7 days   Lab Units 05/12/21  1053 05/11/21  1550   POC GLUCOSE mg/dl 123 108     Results from last 7 days   Lab Units 05/13/21  0453 05/12/21  0521 05/11/21  0653 05/10/21  0950   GLUCOSE RANDOM mg/dL 102 102 104 99       Results from last 7 days   Lab Units 05/11/21  1156   PH TRACI  7 395   PCO2 TRACI mm Hg 63 3*   PO2 TRACI mm Hg 64 9*   HCO3 TRACI mmol/L 37 9*   BASE EXC TRACI mmol/L 10 6   O2 CONTENT TRACI ml/dL 16 7   O2 HGB, VENOUS % 89 8*       Results from last 7 days   Lab Units 05/10/21  1655 05/10/21  0950   TROPONIN I ng/mL <0 03 <0 03       Results from last 7 days   Lab Units 05/10/21  0950   TSH 3RD GENERATON uIU/mL 3 222       Results from last 7 days   Lab Units 05/10/21  1655   DIGOXIN LVL ng/mL 0 8     Results from last 7 days   Lab Units 05/10/21  0950   BNP pg/mL 125 1*           Medications:   Scheduled Medications:  artificial tear, , Both Eyes, HS  budesonide-formoterol, 2 puff, Inhalation, BID  buPROPion, 300 mg, Oral, Daily  calcium carbonate-vitamin D, 1 tablet, Oral, Daily With Breakfast  cholecalciferol, 5,000 Units, Oral, Daily  dabigatran etexilate, 150 mg, Oral, Q12H [address]  digoxin, 125 mcg, Oral, Daily  docusate sodium, 100 mg, Oral, BID  fluticasone, 1 spray, Nasal, BID  furosemide, 40 mg, Intravenous, BID (diuretic)  hydrOXYzine HCL, 25 mg, Oral, Q8H MARY  methocarbamol, 750 mg, Oral, Q8H MARY  metoprolol tartrate, 25 mg, Oral, Q12H MARY  nystatin, , Topical, BID  potassium chloride, 20 mEq, Oral, BID  senna, 1 tablet, Oral, HS  spironolactone, 50 mg, Oral, Daily  traZODone, 150 mg, Oral, HS  Diamox  250 mg iv once 5/13 x 1  Diamox 500 mg iv once- 5/12 x 1       Continuous IV Infusions:     PRN Meds:  acetaminophen, 650 mg, Oral, Q6H PRN - 5/13 x1  albuterol, 2 5 mg, Nebulization, Q6H PRN  ammonium lactate, , Topical, BID PRN  metoprolol, 2 5 mg, Intravenous, Q6H PRN        Discharge Plan: D     Network Utilization Review Department  ATTENTION: Please call with any questions or concerns to [phone] and carefully listen to the prompts so that you are directed to the right person  All voicemails are confidential   Anthony Gutierrez all requests for admission clinical reviews, approved or denied determinations and any other requests to dedicated fax number below belonging to the campus where the patient is receiving treatment   List of dedicated fax numbers for the Facilities:  [address] DENIALS (Administrative/Medical Necessity) [phone]   [facility]) 261 [facility] [address] Charles Samson [address]21 [id number] [address] Waldemar Jackson 1481 [address] [facility] [address] [facility] [phone]

## 2021-05-13 NOTE — ASSESSMENT & PLAN NOTE
· K/C/O obesity hypoventilation syndrome   · Follows a pulmonologist as outpatient - last seen in March 2021   · Previous medical history is also suggestive of severe FILIPPO ( based on home sleep study ) and severe restrictive pattern on PFT   · Patient is on continuous oxygen supplementation of 2 L   · Was advised during previous office visit to strictly use BiPAP at night for maximum benefit and weight loss --patient reports that he has not been fully compliant with his BiPAP use     · Labs : elevated bicarbonate- 45, is likely compensation for his chronic CO2 retention   · Metabolic alkalosis is worsening due to aggressive diuresis leading to volume contraction   · VBG - pCO2 - 63, patient is AAO x 3     PLAN:   · Continue BiPAP HS and PRN - respiratory therapy on board

## 2021-05-13 NOTE — ASSESSMENT & PLAN NOTE
· Patient presented with a prolonged QTc of 537 ms  · On telemetry monitoring   · Serum potassium is 3 4, magnesium is 2 2    PLAN :   · Avoid QTc prolonging agents   · Continue telemetry monitoring   · Order EKG to assess QTc prolongation

## 2021-05-13 NOTE — ASSESSMENT & PLAN NOTE
· Patient presented with SOB, started few days ago worsened on the morning of admission   · Associated symptoms : chest heaviness, orthopnea, PND, decreased urination with the current dose of lasix, increase in cough and sputum production, palpitations, subjective feeling of "fluid in his body / chest"   · No fever, chills, URTI symptoms, calf swelling / pain above baseline, no wheezing, change in color / consistency / character of sputum   · Vitals : saturating at 99% on baseline oxygen of 2 L, intermittent episodes of tachycardia  Monitor showed Afib with RVR - HR - sometimes increasing to 120s   · Labs : normal troponin, mildly elevated BNP - 125 ( patient is morbidly obese), elevated bicarbonate, normal renal function, no leukocytosis   · Chest CTA : ruled out PE, reported diffuse groundglass opacities -- could represent pulmonary edema or infectious / inflammatory process  · Etiology of SOB likely CHF exacerbation, in the setting of tachyarrhythmia  · Patient received 20 mg IV lasix dose on the day of admission due to soft BP - had a urine output of 850 ml within 1 5 hours  · Patient had hypotensive episodes yesterday - lowest - 98 / 49 however BP measurement could be inaccurate given his body habitus   · Patient is supposed to be on a 40 mg PO BID dose at the nursing facility, however is only on 20 mg PO BID as per nursing home medication records   · Patient feels subjectively better - reports improvement in SOB  · I/O - 24 hr UO - 1 8 L, intake is not being documented  Total amount diuresed since admission is about 7 L  Weights are inaccurate  · Received 2 doses of 40 mg IV lasix yesterday and one dose of 500 mg IV diamox     · Cardiology on board     PLAN :   · See further management under "acute on chronic diastolic CHF"   · Continue oxygen supplementation via nasal cannula to maintain sats of 88-92%   · Continue home inhaler - Symbicort, albuterol nebs PRN

## 2021-05-13 NOTE — ASSESSMENT & PLAN NOTE
· H/o DVT and PE, s/p IVC filter insertion in 2019   · Patient was supposed to have the filter removed, but could not, was referred to [facility] but he did not have that done as he switched insurance   · Is on anticoagulation with Pradaxa for Afib     · PLAN:   Continue anticoagulation with Pradaxa

## 2021-05-13 NOTE — PLAN OF CARE
Problem: Potential for Falls  Goal: Patient will remain free of falls  Description: INTERVENTIONS:  - Assess patient frequently for physical needs  -  Identify cognitive and physical deficits and behaviors that affect risk of falls    -  [place] fall precautions as indicated by assessment   - Educate patient/family on patient safety including physical limitations  - Instruct patient to call for assistance with activity based on assessment  - Consider OT/PT consult to assist with strengthening/mobility  Outcome: Progressing     Problem: Prexisting or High Potential for Compromised Skin Integrity  Goal: Skin integrity is maintained or improved  Description: INTERVENTIONS:  - Identify patients at risk for skin breakdown  - Assess and monitor skin integrity  - Assess and monitor nutrition and hydration status  - Monitor labs   - Assess for incontinence   - Turn and reposition patient  - Assist with mobility/ambulation  - Relieve pressure over bony prominences  - Avoid friction and shearing  - Provide appropriate hygiene as needed including keeping skin clean and dry  - Evaluate need for skin moisturizer/barrier cream  - Collaborate with interdisciplinary team   - Patient/family teaching  - Consider wound care consult   Outcome: Progressing     Problem: PAIN - ADULT  Goal: Verbalizes/displays adequate comfort level or baseline comfort level  Description: Interventions:  - Encourage patient to monitor pain and request assistance  - Assess pain using appropriate pain scale  - Administer analgesics based on type and severity of pain and evaluate response  - Implement non-pharmacological measures as appropriate and evaluate response  - Consider cultural and social influences on pain and pain management  - Notify physician/advanced practitioner if interventions unsuccessful or patient reports new pain  Outcome: Progressing     Problem: INFECTION - ADULT  Goal: Absence or prevention of progression during hospitalization  Description: INTERVENTIONS:  - Assess and monitor for signs and symptoms of infection  - Monitor lab/diagnostic results  - Monitor all insertion sites, i e  indwelling lines, tubes, and drains  - Monitor endotracheal if appropriate and nasal secretions for changes in amount and color  - [place] appropriate cooling/warming therapies per order  - Administer medications as ordered  - Instruct and encourage patient and family to use good hand hygiene technique  - Identify and instruct in appropriate isolation precautions for identified infection/condition  Outcome: Progressing     Problem: SAFETY ADULT  Goal: Patient will remain free of falls  Description: INTERVENTIONS:  - Assess patient frequently for physical needs  -  Identify cognitive and physical deficits and behaviors that affect risk of falls    -  [place] fall precautions as indicated by assessment   - Educate patient/family on patient safety including physical limitations  - Instruct patient to call for assistance with activity based on assessment  - Modify environment to reduce risk of injury  - Consider OT/PT consult to assist with strengthening/mobility  Outcome: Progressing     Problem: SAFETY ADULT  Goal: Maintain or return mobility status to optimal level  Description: INTERVENTIONS:  - Assess patient's baseline mobility status (ambulation, transfers, stairs, etc )    - Identify cognitive and physical deficits and behaviors that affect mobility  - Identify mobility aids required to assist with transfers and/or ambulation (gait belt, sit-to-stand, lift, walker, cane, etc )  - [place] fall precautions as indicated by assessment  - Record patient progress and toleration of activity level on Mobility SBAR; progress patient to next Phase/Stage  - Instruct patient to call for assistance with activity based on assessment  - Consider rehabilitation consult to assist with strengthening/weightbearing, etc   Outcome: Progressing     Problem: DISCHARGE PLANNING  Goal: Discharge to home or other facility with appropriate resources  Description: INTERVENTIONS:  - Identify barriers to discharge w/patient and caregiver  - Arrange for needed discharge resources and transportation as appropriate  - Identify discharge learning needs (meds, wound care, etc )  - Arrange for interpretive services to assist at discharge as needed  - Refer to Case Management Department for coordinating discharge planning if the patient needs post-hospital services based on physician/advanced practitioner order or complex needs related to functional status, cognitive ability, or social support system  Outcome: Progressing     Problem: Knowledge Deficit  Goal: Patient/family/caregiver demonstrates understanding of disease process, treatment plan, medications, and discharge instructions  Description: Complete learning assessment and assess knowledge base    Interventions:  - Provide teaching at level of understanding  - Provide teaching via preferred learning methods  Outcome: Progressing     Problem: CARDIOVASCULAR - ADULT  Goal: Maintains optimal cardiac output and hemodynamic stability  Description: INTERVENTIONS:  - Monitor I/O, vital signs and rhythm  - Monitor for S/S and trends of decreased cardiac output  - Administer and titrate ordered vasoactive medications to optimize hemodynamic stability  - Assess quality of pulses, skin color and temperature  - Assess for signs of decreased coronary artery perfusion  - Instruct patient to report change in severity of symptoms  Outcome: Progressing     Problem: RESPIRATORY - ADULT  Goal: Achieves optimal ventilation and oxygenation  Description: INTERVENTIONS:  - Assess for changes in respiratory status  - Assess for changes in mentation and behavior  - Position to facilitate oxygenation and minimize respiratory effort  - Oxygen administered by appropriate delivery if ordered  - Initiate smoking cessation education as indicated  - Encourage broncho-pulmonary hygiene including cough, deep breathe, Incentive Spirometry  - Assess the need for suctioning and aspirate as needed  - Assess and instruct to report SOB or any respiratory difficulty  - Respiratory Therapy support as indicated  Outcome: Progressing

## 2021-05-13 NOTE — PROGRESS NOTES
Adela U  67   Progress Note - Gina Moment 1970, 49 y o  male MRN: [id number]  Unit/Bed#: -[facility] Encounter: [id number]  Primary Care Provider: Guerita Kaba MD   Date and time admitted to hospital: 5/10/2021  9:27 AM    * Shortness of breath  Assessment & Plan  · Patient presented with SOB, started few days ago worsened on the morning of admission   · Associated symptoms : chest heaviness, orthopnea, PND, decreased urination with the current dose of lasix, increase in cough and sputum production, palpitations, subjective feeling of "fluid in his body / chest"   · No fever, chills, URTI symptoms, calf swelling / pain above baseline, no wheezing, change in color / consistency / character of sputum   · Vitals : saturating at 99% on baseline oxygen of 2 L, intermittent episodes of tachycardia  Monitor showed Afib with RVR - HR - sometimes increasing to 120s   · Labs : normal troponin, mildly elevated BNP - 125 ( patient is morbidly obese), elevated bicarbonate, normal renal function, no leukocytosis   · Chest CTA : ruled out PE, reported diffuse groundglass opacities -- could represent pulmonary edema or infectious / inflammatory process  · Etiology of SOB likely CHF exacerbation, in the setting of tachyarrhythmia  · Patient received 20 mg IV lasix dose on the day of admission due to soft BP - had a urine output of 850 ml within 1 5 hours  · Patient had hypotensive episodes yesterday - lowest - 98 / 49 however BP measurement could be inaccurate given his body habitus   · Patient is supposed to be on a 40 mg PO BID dose at the nursing facility, however is only on 20 mg PO BID as per nursing home medication records   · Patient feels subjectively better - reports improvement in SOB  · I/O - 24 hr UO - 1 8 L, intake is not being documented  Total amount diuresed since admission is about 7 L  Weights are inaccurate     · Received 2 doses of 40 mg IV lasix yesterday and one dose of 500 mg IV diamox  · Cardiology on board     PLAN :   · See further management under "acute on chronic diastolic CHF"   · Continue oxygen supplementation via nasal cannula to maintain sats of 88-92%   · Continue home inhaler - Symbicort, albuterol nebs PRN     Obesity hypoventilation syndrome ([facility] )  Assessment & Plan  · K/C/O obesity hypoventilation syndrome   · Follows a pulmonologist as outpatient - last seen in March 2021   · Previous medical history is also suggestive of severe FILIPPO ( based on home sleep study ) and severe restrictive pattern on PFT   · Patient is on continuous oxygen supplementation of 2 L   · Was advised during previous office visit to strictly use BiPAP at night for maximum benefit and weight loss --patient reports that he has not been fully compliant with his BiPAP use  · Labs : elevated bicarbonate- 45, is likely compensation for his chronic CO2 retention   · Metabolic alkalosis is worsening due to aggressive diuresis leading to volume contraction   · VBG - pCO2 - 63, patient is AAO x 3     PLAN:   · Continue BiPAP HS and PRN - respiratory therapy on board     Morbid obesity ([place] [address] )  Assessment & Plan  · S/p gastric sleeve surgery in 2018   · Patient used to weigh approximately 650 pounds, lost about 250-260 pounds and weight has been stable since  · As per nursing home staff, patient refuses to be weighed regularly but they do estimate a significant weight gain     · Patient reported to cardiology that he at least gained about 20 lbs   · Patient follows with bariatric surgery - last seen on 04/21     PLAN :   · Continue outpatient management and follow up with bariatric surgery     DVT (deep venous thrombosis) ([place] [address] )  Assessment & Plan  · H/o DVT and PE, s/p IVC filter insertion in 2019   · Patient was supposed to have the filter removed, but could not, was referred to [facility] but he did not have that done as he switched insurance   · Is on anticoagulation with Pradaxa for Afib     · PLAN: Continue anticoagulation with Pradaxa     Acute on chronic diastolic congestive heart failure [facility])  Assessment & Plan  Wt Readings from Last 3 Encounters:   05/13/21 (!) 242 kg (532 lb 10 1 oz)   06/15/20 (!) 161 kg (355 lb 3 2 oz)   04/18/19 (!) 189 kg (417 lb)     · Followed up with cardiology as outpatient - last seen in Jan 2021   · As per chart review, echo reports were reviewed  Prior ECHO (2017, no recent ECHO available) results indicate poor endocardial definition, but overall normal systolic function   · As per chart review cardiology advised to continue lasix 40 mg PO BID, however patient has been only on 20 mg PO BID at the nursing facility as per nursing home medication records   · S/p 20 mg IV lasix dose at the time of admission - had a urinary output of 850 ml within 1 5 hours of dose administration  20 mg IV dose was chosen as the patient had soft BP ( in the 100s SBP )   · Transthoracic echo--estimated EF - 60 %, study was inadequate for RWMA assessment  Upon personal review by cardiologist, patient seemed to have a good RV function with no dilatation  CHF is likely impaired diastolic function of the LV  · Patient feels subjectively better - reports improvement in SOB  · I/O - 24 hr UO - 1 8 L, intake is not being documented  Total amount diuresed since admission is about 7 L  Weights are inaccurate  · Received 2 doses of 40 mg IV lasix yesterday and one dose of 500 mg IV diamox     · Cardiology on board       PLAN :   · As per cardiology, give an additional 250 mg IV diamox today and continue lasix 40 mg IV BID dosingly   · Closely monitor BP  · Pt will need to be discharged on at least 40 mg PO BID lasix dose   · Continue fluid restriction, strict I/O charting, daily weights  · Needs to be on a strict low-salt diet at the time of discharge   · Outpatient follow up within 1 week at the cardiology office             Atrial fibrillation [facility])  Assessment & Plan  · Patient is a known case of atrial fibrillation, on anticoagulation with pradaxa   · Is also on digoxin 0 125 mg PO daily, and metoprolol tartrate 25 mg PO q12h   · Patient presented with SOB, chest discomfort but no hypoxia   · HR on admission - 109, had intermittent tachycardic episodes in the 110-120s   · Digoxin level - 0 8   · S/p 12 5 mg PO Lopressor x 1 dose at the time of admission due to soft Bps ( SBP - 100s )   · Telemetry overnight -- HR - 90s    PLAN :   · Continue Telemetry monitoring   · Continue metoprolol tartrate at a dose of 25 mg PO q12h   · Continue digoxin at home dose   · Keep magnesium > 2, potassium > 4   · Continue pradaxa at home dose for anticoagulation       QT prolongation  Assessment & Plan  · Patient presented with a prolonged QTc of 537 ms  · On telemetry monitoring   · Serum potassium is 3 4, magnesium is 2 2    PLAN :   · Avoid QTc prolonging agents   · Continue telemetry monitoring   · Order EKG to assess QTc prolongation     Hypothyroidism  Assessment & Plan  · Patient has a history of hypothyroidism, was on levothyroxine 25 mcg PO daily   · This medication is not his list of medications that he takes at the nursing home   · Last TSH in August 2020 - 1 75   · TSH is normal at 3 2 on 05/10    PLAN :   · At the nursing home, patient is not on levothyroxine-- will continue to hold off     COPD (chronic obstructive pulmonary disease) (HCC)  Assessment & Plan  · Known case of COPD, former smoker, quit about 6 years ago, used to smoke 1 ppd x 20 years   · Is on symbicort at the nursing home   · Reports increased cough with increase  In amount of whitish sputum production, no change in color / consistency   · No wheeze heard in physical exam, however diminished breath sound and difficult to appreciate due to body habitus     · Patient has no mental status abnormalities, is alert and oriented x 3     PLAN :  · Continue symbicort while inpatient   · Continue albuterol nebs PRN       Pulmonary artery aneurysm [facility])  Assessment & Plan  · Pulmonary artery aneurysm detected on CTA   · In comparison to imaging obtained in 2017, size increased from 4 9 cm --> 5 4 cm     PLAN :   · Continue outpatient management         INTERNAL MEDICINE RESIDENCY PROGRESS NOTE     Name: Marilu Jordan   Age & Sex: 49 y o  male   MRN: [id number]  Unit/Bed#: -01   Encounter: [id number]    PATIENT INFORMATION     Name: Marilu Jordan   Age & Sex: 49 y o  male   MRN: [id number]  Hospital Stay Days: 2    ASSESSMENT/PLAN     Principal Problem:    Shortness of breath  Active Problems: Morbid obesity (HCC)    Obesity hypoventilation syndrome (HCC)    DVT (deep venous thrombosis) (HCC)    Acute on chronic diastolic congestive heart failure (HCC)    Atrial fibrillation (HCC)    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HCC)    COPD (chronic obstructive pulmonary disease) (HCC)    Hypothyroidism    QT prolongation      * Shortness of breath  Assessment & Plan  · Patient presented with SOB, started few days ago worsened on the morning of admission   · Associated symptoms : chest heaviness, orthopnea, PND, decreased urination with the current dose of lasix, increase in cough and sputum production, palpitations, subjective feeling of "fluid in his body / chest"   · No fever, chills, URTI symptoms, calf swelling / pain above baseline, no wheezing, change in color / consistency / character of sputum   · Vitals : saturating at 99% on baseline oxygen of 2 L, intermittent episodes of tachycardia  Monitor showed Afib with RVR - HR - sometimes increasing to 120s   · Labs : normal troponin, mildly elevated BNP - 125 ( patient is morbidly obese), elevated bicarbonate, normal renal function, no leukocytosis   · Chest CTA : ruled out PE, reported diffuse groundglass opacities -- could represent pulmonary edema or infectious / inflammatory process  · Etiology of SOB likely CHF exacerbation, in the setting of tachyarrhythmia     · Patient received 20 mg IV lasix dose on the day of admission due to soft BP - had a urine output of 850 ml within 1 5 hours  · Patient had hypotensive episodes yesterday - lowest - 98 / 49 however BP measurement could be inaccurate given his body habitus   · Patient is supposed to be on a 40 mg PO BID dose at the nursing facility, however is only on 20 mg PO BID as per nursing home medication records   · Patient feels subjectively better - reports improvement in SOB  · I/O - 24 hr UO - 1 8 L, intake is not being documented  Total amount diuresed since admission is about 7 L  Weights are inaccurate  · Received 2 doses of 40 mg IV lasix yesterday and one dose of 500 mg IV diamox  · Cardiology on board     PLAN :   · See further management under "acute on chronic diastolic CHF"   · Continue oxygen supplementation via nasal cannula to maintain sats of 88-92%   · Continue home inhaler - Symbicort, albuterol nebs PRN     Obesity hypoventilation syndrome ([facility] 75 )  Assessment & Plan  · K/C/O obesity hypoventilation syndrome   · Follows a pulmonologist as outpatient - last seen in March 2021   · Previous medical history is also suggestive of severe FILIPPO ( based on home sleep study ) and severe restrictive pattern on PFT   · Patient is on continuous oxygen supplementation of 2 L   · Was advised during previous office visit to strictly use BiPAP at night for maximum benefit and weight loss --patient reports that he has not been fully compliant with his BiPAP use  · Labs : elevated bicarbonate- 45, is likely compensation for his chronic CO2 retention   · Metabolic alkalosis is worsening due to aggressive diuresis leading to volume contraction   · VBG - pCO2 - 63, patient is AAO x 3     PLAN:   · Continue BiPAP HS and PRN - respiratory therapy on board     Morbid obesity ([facility] 75 )  Assessment & Plan  · S/p gastric sleeve surgery in 2018   · Patient used to weigh approximately 650 pounds, lost about 250-260 pounds and weight has been stable since     · As per nursing home staff, patient refuses to be weighed regularly but they do estimate a significant weight gain  · Patient reported to cardiology that he at least gained about 20 lbs   · Patient follows with bariatric surgery - last seen on 04/21     PLAN :   · Continue outpatient management and follow up with bariatric surgery     DVT (deep venous thrombosis) ([facility] 75 )  Assessment & Plan  · H/o DVT and PE, s/p IVC filter insertion in 2019   · Patient was supposed to have the filter removed, but could not, was referred to [facility] but he did not have that done as he switched insurance   · Is on anticoagulation with Pradaxa for Afib     · PLAN:   Continue anticoagulation with Pradaxa     Acute on chronic diastolic congestive heart failure ([facility] 75 )  Assessment & Plan  Wt Readings from Last 3 Encounters:   05/13/21 (!) 242 kg (532 lb 10 1 oz)   06/15/20 (!) 161 kg (355 lb 3 2 oz)   04/18/19 (!) 189 kg (417 lb)     · Followed up with cardiology as outpatient - last seen in Jan 2021   · As per chart review, echo reports were reviewed  Prior ECHO (2017, no recent ECHO available) results indicate poor endocardial definition, but overall normal systolic function   · As per chart review cardiology advised to continue lasix 40 mg PO BID, however patient has been only on 20 mg PO BID at the nursing facility as per nursing home medication records   · S/p 20 mg IV lasix dose at the time of admission - had a urinary output of 850 ml within 1 5 hours of dose administration  20 mg IV dose was chosen as the patient had soft BP ( in the 100s SBP )   · Transthoracic echo--estimated EF - 60 %, study was inadequate for RWMA assessment  Upon personal review by cardiologist, patient seemed to have a good RV function with no dilatation  CHF is likely impaired diastolic function of the LV  · Patient feels subjectively better - reports improvement in SOB  · I/O - 24 hr UO - 1 8 L, intake is not being documented  Total amount diuresed since admission is about 7 L  Weights are inaccurate  · Received 2 doses of 40 mg IV lasix yesterday and one dose of 500 mg IV diamox     · Cardiology on board       PLAN :   · As per cardiology, give an additional 250 mg IV diamox today and continue lasix 40 mg IV BID dosingly   · Closely monitor BP  · Pt will need to be discharged on at least 40 mg PO BID lasix dose   · Continue fluid restriction, strict I/O charting, daily weights  · Needs to be on a strict low-salt diet at the time of discharge   · Outpatient follow up within 1 week at the cardiology office             Atrial fibrillation [facility])  Assessment & Plan  · Patient is a known case of atrial fibrillation, on anticoagulation with pradaxa   · Is also on digoxin 0 125 mg PO daily, and metoprolol tartrate 25 mg PO q12h   · Patient presented with SOB, chest discomfort but no hypoxia   · HR on admission - 109, had intermittent tachycardic episodes in the 110-120s   · Digoxin level - 0 8   · S/p 12 5 mg PO Lopressor x 1 dose at the time of admission due to soft Bps ( SBP - 100s )   · Telemetry overnight -- HR - 90s    PLAN :   · Continue Telemetry monitoring   · Continue metoprolol tartrate at a dose of 25 mg PO q12h   · Continue digoxin at home dose   · Keep magnesium > 2, potassium > 4   · Continue pradaxa at home dose for anticoagulation       QT prolongation  Assessment & Plan  · Patient presented with a prolonged QTc of 537 ms  · On telemetry monitoring   · Serum potassium is 3 4, magnesium is 2 2    PLAN :   · Avoid QTc prolonging agents   · Continue telemetry monitoring   · Order EKG to assess QTc prolongation     Hypothyroidism  Assessment & Plan  · Patient has a history of hypothyroidism, was on levothyroxine 25 mcg PO daily   · This medication is not his list of medications that he takes at the nursing home   · Last TSH in August 2020 - 1 75   · TSH is normal at 3 2 on 05/10    PLAN :   · At the nursing home, patient is not on levothyroxine-- will continue to hold off     COPD (chronic obstructive pulmonary disease) (HCC)  Assessment & Plan  · Known case of COPD, former smoker, quit about 6 years ago, used to smoke 1 ppd x 20 years   · Is on symbicort at the nursing home   · Reports increased cough with increase  In amount of whitish sputum production, no change in color / consistency   · No wheeze heard in physical exam, however diminished breath sound and difficult to appreciate due to body habitus  · Patient has no mental status abnormalities, is alert and oriented x 3     PLAN :  · Continue symbicort while inpatient   · Continue albuterol nebs PRN       Pulmonary artery aneurysm [facility])  Assessment & Plan  · Pulmonary artery aneurysm detected on CTA   · In comparison to imaging obtained in 2017, size increased from 4 9 cm --> 5 4 cm     PLAN :   · Continue outpatient management         SUBJECTIVE     Patient seen and examined  No acute events overnight  Patient reports improvement in SOB  No nausea, vomiting, chest pain  Has been having a good urine output  OBJECTIVE     Vitals:    21 0500 21 0547 21 0729 21 0752   BP: (!) 102/48 (!) 98/49 128/74    BP Location: Right arm Right arm Right arm    Pulse: 102 91 89    Resp: 20 20 20    Temp: 97 8 °F (36 6 °C) 97 6 °F (36 4 °C) (!) 96 5 °F (35 8 °C)    TempSrc: Tympanic Tympanic Tympanic    SpO2: 95% 98% 95% 93%   Weight:  (!) 242 kg (532 lb 10 1 oz)     Height:          Temperature:   Temp (24hrs), Av 6 °F (36 4 °C), Min:96 5 °F (35 8 °C), Max:98 1 °F (36 7 °C)    Temperature: (!) 96 5 °F (35 8 °C)  Intake & Output:  I/O        07 -  0700  07 -  0700  07 -  0700    P  O  1140      Total Intake(mL/kg) 1140 (4 5)      Urine (mL/kg/hr) 5525 (0 9) 1850 (0 3)     Stool       Total Output 5525 1850     Net -4384 -1850                Weights:   IBW (Ideal Body Weight): 74 15 kg    Body mass index is 75 34 kg/m²    Weight (last 2 days)     Date/Time   Weight    21 0532 Joel Arnold ) 242 (532 63)    05/13/21 0458   (!) 241 (532 19)    05/12/21 0525   (!) 253 (557 98)    05/11/21 1900   (!) 241 (531 97)            Physical Exam  Vitals signs and nursing note reviewed  Constitutional:       General: He is not in acute distress  Appearance: He is well-developed  He is obese  HENT:      Head: Normocephalic and atraumatic  Mouth/Throat:      Mouth: Mucous membranes are moist    Eyes:      General:         Right eye: No discharge  Left eye: No discharge  Conjunctiva/sclera: Conjunctivae normal    Neck:      Musculoskeletal: Neck supple  Cardiovascular:      Rate and Rhythm: Normal rate  Rhythm irregular  Pulses: Normal pulses  Heart sounds: Normal heart sounds  No murmur  Pulmonary:      Effort: Pulmonary effort is normal  No respiratory distress  Comments: diminished breath sounds bilaterally, cannot appreciate wheeze/ crackles however exam is limited due to body habitus   Abdominal:      Palpations: Abdomen is soft  Tenderness: There is no abdominal tenderness  Musculoskeletal:      Right lower leg: Edema present  Left lower leg: Edema present  Skin:     General: Skin is warm and dry  Capillary Refill: Capillary refill takes less than 2 seconds  Neurological:      General: No focal deficit present  Mental Status: He is alert and oriented to person, place, and time  Psychiatric:         Mood and Affect: Mood normal        LABORATORY DATA     Labs: I have personally reviewed pertinent reports    Results from last 7 days   Lab Units 05/13/21 0453 05/12/21  0521 05/11/21  0653   WBC Thousand/uL 6 12 5 35 5 39   HEMOGLOBIN g/dL 13 8 13 4 12 7   HEMATOCRIT % 43 1 42 0 39 9   PLATELETS Thousands/uL 169 147* 168   NEUTROS PCT % 66 67 69   MONOS PCT % 9 8 7      Results from last 7 days   Lab Units 05/13/21 0453 05/12/21  0521 05/11/21  0653 05/10/21  0950   POTASSIUM mmol/L 3 4* 3 4* 3 8 4 3   CHLORIDE mmol/L 94* 92* 95* 94*   CO2 mmol/L 36* 45* 43* 42*   BUN mg/dL 16 15 15 18   CREATININE mg/dL 0 87 0 84 0 79 0 84   CALCIUM mg/dL 9 5 9 3 8 9 9 3   ALK PHOS U/L  --   --   --  75 5   ALT U/L  --   --   --  10   AST U/L  --   --   --  11*     Results from last 7 days   Lab Units 05/13/21  0453 05/12/21  0521 05/11/21  0653   MAGNESIUM mg/dL 2 2 2 1 1 9                  Results from last 7 days   Lab Units 05/10/21  1655 05/10/21  0950   TROPONIN I ng/mL <0 03 <0 03       IMAGING & DIAGNOSTIC TESTING     Radiology Results: I have personally reviewed pertinent reports  Xr Chest 1 View Portable    Result Date: 5/10/2021  Impression: Limited study  Probably no acute cardiopulmonary disease within limitations  Workstation performed: [id number]     Cta Ed Chest Pe Study    Result Date: 5/10/2021  Impression: 1  No pulmonary emboli within study limitations  2   Pulmonary artery aneurysm, now measuring 5 4 cm, previously 4 9 cm  3   Diffuse groundglass, which may represent pulmonary edema  or infectious/inflammatory process  No focal consolidation  Workstation performed: [id number]     Other Diagnostic Testing: I have personally reviewed pertinent reports      ACTIVE MEDICATIONS     Current Facility-Administered Medications   Medication Dose Route Frequency    acetaminophen (TYLENOL) tablet 650 mg  650 mg Oral Q6H PRN    albuterol inhalation solution 2 5 mg  2 5 mg Nebulization Q6H PRN    ammonium lactate (LAC-HYDRIN) 12 % cream   Topical BID PRN    artificial tear (LUBRIFRESH P M ) ophthalmic ointment   Both Eyes HS    budesonide-formoterol (SYMBICORT) 160-4 5 mcg/act inhaler 2 puff  2 puff Inhalation BID    buPROPion (WELLBUTRIN XL) 24 hr tablet 300 mg  300 mg Oral Daily    calcium carbonate-vitamin D (OSCAL-D) 500 mg-200 units per tablet 1 tablet  1 tablet Oral Daily With Breakfast    cholecalciferol (VITAMIN D3) tablet 5,000 Units  5,000 Units Oral Daily    dabigatran etexilate (PRADAXA) capsule 150 mg  150 mg Oral Q12H [address]    digoxin (LANOXIN) tablet 125 mcg  125 mcg Oral Daily    docusate sodium (COLACE) capsule 100 mg  100 mg Oral BID    fluticasone (FLONASE) 50 mcg/act nasal spray 1 spray  1 spray Nasal BID    furosemide (LASIX) injection 40 mg  40 mg Intravenous BID (diuretic)    hydrOXYzine HCL (ATARAX) tablet 25 mg  25 mg Oral Q8H [facility]    methocarbamol (ROBAXIN) tablet 750 mg  750 mg Oral Q8H [facility]    metoprolol (LOPRESSOR) injection 2 5 mg  2 5 mg Intravenous Q6H PRN    metoprolol tartrate (LOPRESSOR) tablet 25 mg  25 mg Oral Q12H [facility]    nystatin (MYCOSTATIN) powder   Topical BID    potassium chloride (K-DUR,KLOR-CON) CR tablet 20 mEq  20 mEq Oral BID    senna (SENOKOT) tablet 8 6 mg  1 tablet Oral HS    spironolactone (ALDACTONE) tablet 50 mg  50 mg Oral Daily    traZODone (DESYREL) tablet 150 mg  150 mg Oral HS       VTE Pharmacologic Prophylaxis: Reason for no pharmacologic prophylaxis anticoagulated with Pradaxa   VTE Mechanical Prophylaxis: sequential compression device    Portions of the record may have been created with voice recognition software  Occasional wrong word or "sound a like" substitutions may have occurred due to the inherent limitations of voice recognition software    Read the chart carefully and recognize, using context, where substitutions have occurred   ==  Marilyn Espana MD  [facility]  Internal Medicine Residency PGY-2

## 2021-05-13 NOTE — PLAN OF CARE
Problem: Potential for Falls  Goal: Patient will remain free of falls  Description: INTERVENTIONS:  - Assess patient frequently for physical needs  -  Identify cognitive and physical deficits and behaviors that affect risk of falls    -  [place] fall precautions as indicated by assessment   - Educate patient/family on patient safety including physical limitations  - Instruct patient to call for assistance with activity based on assessment  - Modify environment to reduce risk of injury  - Consider OT/PT consult to assist with strengthening/mobility  Outcome: Progressing     Problem: Prexisting or High Potential for Compromised Skin Integrity  Goal: Skin integrity is maintained or improved  Description: INTERVENTIONS:  - Identify patients at risk for skin breakdown  - Assess and monitor skin integrity  - Assess and monitor nutrition and hydration status  - Monitor labs   - Assess for incontinence   - Turn and reposition patient  - Assist with mobility/ambulation  - Relieve pressure over bony prominences  - Avoid friction and shearing  - Provide appropriate hygiene as needed including keeping skin clean and dry  - Evaluate need for skin moisturizer/barrier cream  - Collaborate with interdisciplinary team   - Patient/family teaching  - Consider wound care consult   Outcome: Progressing     Problem: PAIN - ADULT  Goal: Verbalizes/displays adequate comfort level or baseline comfort level  Description: Interventions:  - Encourage patient to monitor pain and request assistance  - Assess pain using appropriate pain scale  - Administer analgesics based on type and severity of pain and evaluate response  - Implement non-pharmacological measures as appropriate and evaluate response  - Consider cultural and social influences on pain and pain management  - Notify physician/advanced practitioner if interventions unsuccessful or patient reports new pain  Outcome: Progressing     Problem: INFECTION - ADULT  Goal: Absence or prevention of progression during hospitalization  Description: INTERVENTIONS:  - Assess and monitor for signs and symptoms of infection  - Monitor lab/diagnostic results  - Monitor all insertion sites, i e  indwelling lines, tubes, and drains  - Monitor endotracheal if appropriate and nasal secretions for changes in amount and color  - [place] appropriate cooling/warming therapies per order  - Administer medications as ordered  - Instruct and encourage patient and family to use good hand hygiene technique  - Identify and instruct in appropriate isolation precautions for identified infection/condition  Outcome: Progressing     Problem: SAFETY ADULT  Goal: Patient will remain free of falls  Description: INTERVENTIONS:  - Assess patient frequently for physical needs  -  Identify cognitive and physical deficits and behaviors that affect risk of falls    -  [place] fall precautions as indicated by assessment   - Educate patient/family on patient safety including physical limitations  - Instruct patient to call for assistance with activity based on assessment  - Modify environment to reduce risk of injury  - Consider OT/PT consult to assist with strengthening/mobility  Outcome: Progressing  Goal: Maintain or return to baseline ADL function  Description: INTERVENTIONS:  -  Assess patient's ability to carry out ADLs; assess patient's baseline for ADL function and identify physical deficits which impact ability to perform ADLs (bathing, care of mouth/teeth, toileting, grooming, dressing, etc )  - Assess/evaluate cause of self-care deficits   - Assess range of motion  - Assess patient's mobility; develop plan if impaired  - Assess patient's need for assistive devices and provide as appropriate  - Encourage maximum independence but intervene and supervise when necessary  - Involve family in performance of ADLs  - Assess for home care needs following discharge   - Consider OT consult to assist with ADL evaluation and planning for discharge  - Provide patient education as appropriate  Outcome: Progressing  Goal: Maintain or return mobility status to optimal level  Description: INTERVENTIONS:  - Assess patient's baseline mobility status (ambulation, transfers, stairs, etc )    - Identify cognitive and physical deficits and behaviors that affect mobility  - Identify mobility aids required to assist with transfers and/or ambulation (gait belt, sit-to-stand, lift, walker, cane, etc )  - [place] fall precautions as indicated by assessment  - Record patient progress and toleration of activity level on Mobility SBAR; progress patient to next Phase/Stage  - Instruct patient to call for assistance with activity based on assessment  - Consider rehabilitation consult to assist with strengthening/weightbearing, etc   Outcome: Progressing     Problem: DISCHARGE PLANNING  Goal: Discharge to home or other facility with appropriate resources  Description: INTERVENTIONS:  - Identify barriers to discharge w/patient and caregiver  - Arrange for needed discharge resources and transportation as appropriate  - Identify discharge learning needs (meds, wound care, etc )  - Arrange for interpretive services to assist at discharge as needed  - Refer to Case Management Department for coordinating discharge planning if the patient needs post-hospital services based on physician/advanced practitioner order or complex needs related to functional status, cognitive ability, or social support system  Outcome: Progressing     Problem: Knowledge Deficit  Goal: Patient/family/caregiver demonstrates understanding of disease process, treatment plan, medications, and discharge instructions  Description: Complete learning assessment and assess knowledge base    Interventions:  - Provide teaching at level of understanding  - Provide teaching via preferred learning methods  Outcome: Progressing     Problem: CARDIOVASCULAR - ADULT  Goal: Maintains optimal cardiac output and hemodynamic stability  Description: INTERVENTIONS:  - Monitor I/O, vital signs and rhythm  - Monitor for S/S and trends of decreased cardiac output  - Administer and titrate ordered vasoactive medications to optimize hemodynamic stability  - Assess quality of pulses, skin color and temperature  - Assess for signs of decreased coronary artery perfusion  - Instruct patient to report change in severity of symptoms  Outcome: Progressing  Goal: Absence of cardiac dysrhythmias or at baseline rhythm  Description: INTERVENTIONS:  - Continuous cardiac monitoring, vital signs, obtain 12 lead EKG if ordered  - Administer antiarrhythmic and heart rate control medications as ordered  - Monitor electrolytes and administer replacement therapy as ordered  Outcome: Progressing     Problem: RESPIRATORY - ADULT  Goal: Achieves optimal ventilation and oxygenation  Description: INTERVENTIONS:  - Assess for changes in respiratory status  - Assess for changes in mentation and behavior  - Position to facilitate oxygenation and minimize respiratory effort  - Oxygen administered by appropriate delivery if ordered  - Initiate smoking cessation education as indicated  - Encourage broncho-pulmonary hygiene including cough, deep breathe, Incentive Spirometry  - Assess the need for suctioning and aspirate as needed  - Assess and instruct to report SOB or any respiratory difficulty  - Respiratory Therapy support as indicated  Outcome: Progressing     Problem: Potential for Falls  Goal: Patient will remain free of falls  Description: INTERVENTIONS:  - Assess patient frequently for physical needs  -  Identify cognitive and physical deficits and behaviors that affect risk of falls    -  [place] fall precautions as indicated by assessment   - Educate patient/family on patient safety including physical limitations  - Instruct patient to call for assistance with activity based on assessment  - Modify environment to reduce risk of injury  - Consider OT/PT consult to assist with strengthening/mobility  Outcome: Progressing     Problem: Prexisting or High Potential for Compromised Skin Integrity  Goal: Skin integrity is maintained or improved  Description: INTERVENTIONS:  - Identify patients at risk for skin breakdown  - Assess and monitor skin integrity  - Assess and monitor nutrition and hydration status  - Monitor labs   - Assess for incontinence   - Turn and reposition patient  - Assist with mobility/ambulation  - Relieve pressure over bony prominences  - Avoid friction and shearing  - Provide appropriate hygiene as needed including keeping skin clean and dry  - Evaluate need for skin moisturizer/barrier cream  - Collaborate with interdisciplinary team   - Patient/family teaching  - Consider wound care consult   Outcome: Progressing     Problem: PAIN - ADULT  Goal: Verbalizes/displays adequate comfort level or baseline comfort level  Description: Interventions:  - Encourage patient to monitor pain and request assistance  - Assess pain using appropriate pain scale  - Administer analgesics based on type and severity of pain and evaluate response  - Implement non-pharmacological measures as appropriate and evaluate response  - Consider cultural and social influences on pain and pain management  - Notify physician/advanced practitioner if interventions unsuccessful or patient reports new pain  Outcome: Progressing     Problem: INFECTION - ADULT  Goal: Absence or prevention of progression during hospitalization  Description: INTERVENTIONS:  - Assess and monitor for signs and symptoms of infection  - Monitor lab/diagnostic results  - Monitor all insertion sites, i e  indwelling lines, tubes, and drains  - Monitor endotracheal if appropriate and nasal secretions for changes in amount and color  - [place] appropriate cooling/warming therapies per order  - Administer medications as ordered  - Instruct and encourage patient and family to use good hand hygiene technique  - Identify and instruct in appropriate isolation precautions for identified infection/condition  Outcome: Progressing     Problem: SAFETY ADULT  Goal: Patient will remain free of falls  Description: INTERVENTIONS:  - Assess patient frequently for physical needs  -  Identify cognitive and physical deficits and behaviors that affect risk of falls    -  [place] fall precautions as indicated by assessment   - Educate patient/family on patient safety including physical limitations  - Instruct patient to call for assistance with activity based on assessment  - Modify environment to reduce risk of injury  - Consider OT/PT consult to assist with strengthening/mobility  Outcome: Progressing  Goal: Maintain or return to baseline ADL function  Description: INTERVENTIONS:  -  Assess patient's ability to carry out ADLs; assess patient's baseline for ADL function and identify physical deficits which impact ability to perform ADLs (bathing, care of mouth/teeth, toileting, grooming, dressing, etc )  - Assess/evaluate cause of self-care deficits   - Assess range of motion  - Assess patient's mobility; develop plan if impaired  - Assess patient's need for assistive devices and provide as appropriate  - Encourage maximum independence but intervene and supervise when necessary  - Involve family in performance of ADLs  - Assess for home care needs following discharge   - Consider OT consult to assist with ADL evaluation and planning for discharge  - Provide patient education as appropriate  Outcome: Progressing  Goal: Maintain or return mobility status to optimal level  Description: INTERVENTIONS:  - Assess patient's baseline mobility status (ambulation, transfers, stairs, etc )    - Identify cognitive and physical deficits and behaviors that affect mobility  - Identify mobility aids required to assist with transfers and/or ambulation (gait belt, sit-to-stand, lift, walker, cane, etc )  - [place] fall precautions as indicated by assessment  - Record patient progress and toleration of activity level on Mobility SBAR; progress patient to next Phase/Stage  - Instruct patient to call for assistance with activity based on assessment  - Consider rehabilitation consult to assist with strengthening/weightbearing, etc   Outcome: Progressing     Problem: DISCHARGE PLANNING  Goal: Discharge to home or other facility with appropriate resources  Description: INTERVENTIONS:  - Identify barriers to discharge w/patient and caregiver  - Arrange for needed discharge resources and transportation as appropriate  - Identify discharge learning needs (meds, wound care, etc )  - Arrange for interpretive services to assist at discharge as needed  - Refer to Case Management Department for coordinating discharge planning if the patient needs post-hospital services based on physician/advanced practitioner order or complex needs related to functional status, cognitive ability, or social support system  Outcome: Progressing     Problem: Knowledge Deficit  Goal: Patient/family/caregiver demonstrates understanding of disease process, treatment plan, medications, and discharge instructions  Description: Complete learning assessment and assess knowledge base    Interventions:  - Provide teaching at level of understanding  - Provide teaching via preferred learning methods  Outcome: Progressing     Problem: CARDIOVASCULAR - ADULT  Goal: Maintains optimal cardiac output and hemodynamic stability  Description: INTERVENTIONS:  - Monitor I/O, vital signs and rhythm  - Monitor for S/S and trends of decreased cardiac output  - Administer and titrate ordered vasoactive medications to optimize hemodynamic stability  - Assess quality of pulses, skin color and temperature  - Assess for signs of decreased coronary artery perfusion  - Instruct patient to report change in severity of symptoms  Outcome: Progressing  Goal: Absence of cardiac dysrhythmias or at baseline rhythm  Description: INTERVENTIONS:  - Continuous cardiac monitoring, vital signs, obtain 12 lead EKG if ordered  - Administer antiarrhythmic and heart rate control medications as ordered  - Monitor electrolytes and administer replacement therapy as ordered  Outcome: Progressing     Problem: RESPIRATORY - ADULT  Goal: Achieves optimal ventilation and oxygenation  Description: INTERVENTIONS:  - Assess for changes in respiratory status  - Assess for changes in mentation and behavior  - Position to facilitate oxygenation and minimize respiratory effort  - Oxygen administered by appropriate delivery if ordered  - Initiate smoking cessation education as indicated  - Encourage broncho-pulmonary hygiene including cough, deep breathe, Incentive Spirometry  - Assess the need for suctioning and aspirate as needed  - Assess and instruct to report SOB or any respiratory difficulty  - Respiratory Therapy support as indicated  Outcome: Progressing

## 2021-05-13 NOTE — ASSESSMENT & PLAN NOTE
Wt Readings from Last 3 Encounters:   05/13/21 (!) 242 kg (532 lb 10 1 oz)   06/15/20 (!) 161 kg (355 lb 3 2 oz)   04/18/19 (!) 189 kg (417 lb)     · Followed up with cardiology as outpatient - last seen in Jan 2021   · As per chart review, echo reports were reviewed  Prior ECHO (2017, no recent ECHO available) results indicate poor endocardial definition, but overall normal systolic function   · As per chart review cardiology advised to continue lasix 40 mg PO BID, however patient has been only on 20 mg PO BID at the nursing facility as per nursing home medication records   · S/p 20 mg IV lasix dose at the time of admission - had a urinary output of 850 ml within 1 5 hours of dose administration  20 mg IV dose was chosen as the patient had soft BP ( in the 100s SBP )   · Transthoracic echo--estimated EF - 60 %, study was inadequate for RWMA assessment  Upon personal review by cardiologist, patient seemed to have a good RV function with no dilatation  CHF is likely impaired diastolic function of the LV  · Patient feels subjectively better - reports improvement in SOB  · I/O - 24 hr UO - 1 8 L, intake is not being documented  Total amount diuresed since admission is about 7 L  Weights are inaccurate  · Received 2 doses of 40 mg IV lasix yesterday and one dose of 500 mg IV diamox     · Cardiology on board       PLAN :   · As per cardiology, give an additional 250 mg IV diamox today and continue lasix 40 mg IV BID dosingly   · Closely monitor BP  · Pt will need to be discharged on at least 40 mg PO BID lasix dose   · Continue fluid restriction, strict I/O charting, daily weights  · Needs to be on a strict low-salt diet at the time of discharge   · Outpatient follow up within 1 week at the cardiology office

## 2021-05-13 NOTE — PROGRESS NOTES
Cardiology Progress Note - Sergio Villafana 49 y o  male MRN: [id number]    Unit/Bed#: -01 Encounter: [id number]      Assessment/Recommendations:  1  Acute on chronic diastolic CHF: significant diuretic response overnight with good response of CO2 to IV diamox dosing  Will give one more dose of diamox 250mg IV today and continue diuresis at current dosing of IV lasix  Cr stable  I/Os do seem to be negative, although it is unclear whether accurate as intake has not been recorded  Significant weight reduction also noted  2   Hypokalemia: replete K to keep >4    3   Morbid obesity  4  Obesity hypoventilation syndrome: continues on CPAP at night  5   Metabolic alkalosis: likely secondary to contraction with diuresis  Will dose IV diamox today as well at reduced dose  6   Chronic, persistent afib: rates controlled on telemetry  Continued on B-blocker  Continue on Pradaxa for anticoagulation  7  COPD: as per primary team   8   Hypothyroidism: as per primary team     Subjective:   Patient seen and examined  No significant events overnight   stable dyspnea on exertion, lower extremity edema, ; pertinent negatives - chest pain, chest pressure/discomfort, irregular heart beat, near-syncope and palpitations  Objective:     Vitals: Blood pressure 128/74, pulse 89, temperature (!) 96 5 °F (35 8 °C), temperature source Tympanic, resp  rate 20, height 5' 10 5" (1 791 m), weight (!) 242 kg (532 lb 10 1 oz), SpO2 93 %  , Body mass index is 75 34 kg/m² ,   Orthostatic Blood Pressures      Most Recent Value   Blood Pressure  128/74 filed at 05/13/2021 0729   Patient Position - Orthostatic VS  Lying filed at 05/13/2021 0729            Intake/Output Summary (Last 24 hours) at 5/13/2021 0832  Last data filed at 5/13/2021 0300  Gross per 24 hour   Intake --   Output 1850 ml   Net -1850 ml       TELE: Afib, rates controlled      Physical Exam:    GEN: Sergio Villafana appears obese  HEENT: pupils equal, round, and reactive to light; extraocular muscles intact  NECK: supple, no carotid bruits, difficult to discern JVP   HEART: irregular rhythm, normal S1 and S2, no murmurs, clicks, gallops or rubs   LUNGS: clear to auscultation bilaterally; no wheezes, rales, or rhonchi   ABDOMEN: normal bowel sounds, soft, no tenderness, no distention  EXTREMITIES: peripheral pulses normal; no clubbing, cyanosis, + edema  NEURO: no focal findings   SKIN: normal without suspicious lesions on exposed skin    Medications:      Current Facility-Administered Medications:     acetaminophen (TYLENOL) tablet 650 mg, 650 mg, Oral, Q6H PRN, Marya Townsend MD, 650 mg at 05/12/21 0830    albuterol inhalation solution 2 5 mg, 2 5 mg, Nebulization, Q6H PRN, Marya Townsend MD, 2 5 mg at 05/11/21 0742    ammonium lactate (LAC-HYDRIN) 12 % cream, , Topical, BID PRN, Nieves Beaulieu MD, Given at 05/11/21 1541    artificial tear (LUBRIFRESH P M ) ophthalmic ointment, , Both Eyes, HS, Marya Townsend MD, Given at 05/12/21 2206    budesonide-formoterol (SYMBICORT) 160-4 5 mcg/act inhaler 2 puff, 2 puff, Inhalation, BID, Marya Townsend MD, 2 puff at 05/13/21 0749    buPROPion (WELLBUTRIN XL) 24 hr tablet 300 mg, 300 mg, Oral, Daily, Brendan Kilpatrick MD, 300 mg at 05/12/21 0824    calcium carbonate-vitamin D (OSCAL-D) 500 mg-200 units per tablet 1 tablet, 1 tablet, Oral, Daily With Breakfast, Marya Townsend MD, 1 tablet at 05/12/21 0824    cholecalciferol (VITAMIN D3) tablet 5,000 Units, 5,000 Units, Oral, Daily, Marya Townsend MD, 5,000 Units at 05/12/21 0824    dabigatran etexilate (PRADAXA) capsule 150 mg, 150 mg, Oral, Q12H [address], Marya Townsend MD, 150 mg at 05/12/21 2157    digoxin (LANOXIN) tablet 125 mcg, 125 mcg, Oral, Daily, Marya Townsend MD, 125 mcg at 05/12/21 0825    docusate sodium (COLACE) capsule 100 mg, 100 mg, Oral, BID, Brendan Kilpatrick MD, 100 mg at 05/12/21 1724    fluticasone (FLONASE) 50 mcg/act nasal spray 1 spray, 1 spray, Nasal, BID, Maggie Rod MD, 1 spray at 05/12/21 1733    furosemide (LASIX) injection 40 mg, 40 mg, Intravenous, BID (diuretic), Maggie Rod MD    hydrOXYzine HCL (ATARAX) tablet 25 mg, 25 mg, Oral, Q8H [address], Brendan Kilpatrick MD, 25 mg at 05/13/21 0502    methocarbamol (ROBAXIN) tablet 750 mg, 750 mg, Oral, Q8H [address], Maggie Rod MD, 750 mg at 05/12/21 2157    metoprolol (LOPRESSOR) injection 2 5 mg, 2 5 mg, Intravenous, Q6H PRN, Maggie Rod MD    metoprolol tartrate (LOPRESSOR) tablet 25 mg, 25 mg, Oral, Q12H [address], Graham Mccall MD    nystatin (MYCOSTATIN) powder, , Topical, BID, Graham Mccall MD, Given at 05/12/21 1734    potassium chloride (K-DUR,KLOR-CON) CR tablet 20 mEq, 20 mEq, Oral, BID, Maggie Rod MD, 20 mEq at 05/12/21 1724    senna (SENOKOT) tablet 8 6 mg, 1 tablet, Oral, HS, Brendan Kilpatrick MD, 8 6 mg at 05/12/21 2157    spironolactone (ALDACTONE) tablet 50 mg, 50 mg, Oral, Daily, Maggie Rod MD, 50 mg at 05/12/21 0825    traZODone (DESYREL) tablet 150 mg, 150 mg, Oral, HS, Maggie Rod MD, 150 mg at 05/11/21 2144     Labs & Results:    Results from last 7 days   Lab Units 05/10/21  1655 05/10/21  0950   TROPONIN I ng/mL <0 03 <0 03     Results from last 7 days   Lab Units 05/13/21  0453 05/12/21  0521 05/11/21  0653   WBC Thousand/uL 6 12 5 35 5 39   HEMOGLOBIN g/dL 13 8 13 4 12 7   HEMATOCRIT % 43 1 42 0 39 9   PLATELETS Thousands/uL 169 147* 168         Results from last 7 days   Lab Units 05/13/21  0453 05/12/21  0521 05/11/21  0653 05/10/21  0950   POTASSIUM mmol/L 3 4* 3 4* 3 8 4 3   CHLORIDE mmol/L 94* 92* 95* 94*   CO2 mmol/L 36* 45* 43* 42*   BUN mg/dL 16 15 15 18   CREATININE mg/dL 0 87 0 84 0 79 0 84   CALCIUM mg/dL 9 5 9 3 8 9 9 3   ALK PHOS U/L  --   --   --  75 5   ALT U/L  --   --   --  10   AST U/L  --   --   --  11*         Results from last 7 days   Lab Units 05/13/21  0453 05/12/21  0521 05/11/21  0653   MAGNESIUM mg/dL 2 2 2 1 1 9       Echo: personally reviewed - normal LVEF, technically difficult study    EKG personally reviewed by Lester Johnson MD

## 2021-05-13 NOTE — ASSESSMENT & PLAN NOTE
· Patient is a known case of atrial fibrillation, on anticoagulation with pradaxa   · Is also on digoxin 0 125 mg PO daily, and metoprolol tartrate 25 mg PO q12h   · Patient presented with SOB, chest discomfort but no hypoxia   · HR on admission - 109, had intermittent tachycardic episodes in the 110-120s   · Digoxin level - 0 8   · S/p 12 5 mg PO Lopressor x 1 dose at the time of admission due to soft Bps ( SBP - 100s )   · Telemetry overnight -- HR - 90s    PLAN :   · Continue Telemetry monitoring   · Continue metoprolol tartrate at a dose of 25 mg PO q12h   · Continue digoxin at home dose   · Keep magnesium > 2, potassium > 4   · Continue pradaxa at home dose for anticoagulation

## 2021-05-13 NOTE — ASSESSMENT & PLAN NOTE
· Patient has a history of hypothyroidism, was on levothyroxine 25 mcg PO daily   · This medication is not his list of medications that he takes at the nursing home   · Last TSH in August 2020 - 1 75   · TSH is normal at 3 2 on 05/10    PLAN :   · At the nursing home, patient is not on levothyroxine-- will continue to hold off 09-Dec-2018

## 2021-05-14 LAB — SARS-COV-2 RNA RESP QL NAA+PROBE: NEGATIVE

## 2021-05-14 PROCEDURE — 94760 N-INVAS EAR/PLS OXIMETRY 1: CPT

## 2021-05-14 PROCEDURE — 94640 AIRWAY INHALATION TREATMENT: CPT

## 2021-05-14 PROCEDURE — 99233 SBSQ HOSP IP/OBS HIGH 50: CPT | Performed by: INTERNAL MEDICINE

## 2021-05-14 PROCEDURE — 87635 SARS-COV-2 COVID-19 AMP PRB: CPT | Performed by: INTERNAL MEDICINE

## 2021-05-14 PROCEDURE — 94668 MNPJ CHEST WALL SBSQ: CPT

## 2021-05-14 RX ORDER — FUROSEMIDE 40 MG/1
40 TABLET ORAL 2 TIMES DAILY
Qty: 60 TABLET | Refills: 0 | Status: SHIPPED | OUTPATIENT
Start: 2021-05-14 | End: 2021-11-15 | Stop reason: HOSPADM

## 2021-05-14 RX ORDER — POTASSIUM CHLORIDE 20 MEQ/1
20 TABLET, EXTENDED RELEASE ORAL 2 TIMES DAILY
Qty: 60 TABLET | Refills: 0 | Status: SHIPPED | OUTPATIENT
Start: 2021-05-14 | End: 2021-11-15 | Stop reason: HOSPADM

## 2021-05-14 RX ADMIN — DIGOXIN 125 MCG: 125 TABLET ORAL at 09:49

## 2021-05-14 RX ADMIN — METHOCARBAMOL TABLETS 750 MG: 500 TABLET, COATED ORAL at 22:48

## 2021-05-14 RX ADMIN — METOPROLOL TARTRATE 25 MG: 25 TABLET, FILM COATED ORAL at 09:49

## 2021-05-14 RX ADMIN — FLUTICASONE PROPIONATE 1 SPRAY: 50 SPRAY, METERED NASAL at 17:13

## 2021-05-14 RX ADMIN — FUROSEMIDE 40 MG: 10 INJECTION, SOLUTION INTRAMUSCULAR; INTRAVENOUS at 09:52

## 2021-05-14 RX ADMIN — FLUTICASONE PROPIONATE 1 SPRAY: 50 SPRAY, METERED NASAL at 09:52

## 2021-05-14 RX ADMIN — WHITE PETROLATUM 57.7 %-MINERAL OIL 31.9 % EYE OINTMENT 1 APPLICATION: at 21:31

## 2021-05-14 RX ADMIN — TRAZODONE HYDROCHLORIDE 150 MG: 100 TABLET ORAL at 21:31

## 2021-05-14 RX ADMIN — SPIRONOLACTONE 50 MG: 25 TABLET, FILM COATED ORAL at 09:49

## 2021-05-14 RX ADMIN — BUDESONIDE AND FORMOTEROL FUMARATE DIHYDRATE 2 PUFF: 160; 4.5 AEROSOL RESPIRATORY (INHALATION) at 08:02

## 2021-05-14 RX ADMIN — Medication 5000 UNITS: at 09:49

## 2021-05-14 RX ADMIN — FUROSEMIDE 40 MG: 10 INJECTION, SOLUTION INTRAMUSCULAR; INTRAVENOUS at 16:29

## 2021-05-14 RX ADMIN — BUDESONIDE AND FORMOTEROL FUMARATE DIHYDRATE 2 PUFF: 160; 4.5 AEROSOL RESPIRATORY (INHALATION) at 20:06

## 2021-05-14 RX ADMIN — DABIGATRAN ETEXILATE MESYLATE 150 MG: 150 CAPSULE ORAL at 21:31

## 2021-05-14 RX ADMIN — DOCUSATE SODIUM 100 MG: 100 CAPSULE ORAL at 09:49

## 2021-05-14 RX ADMIN — METHOCARBAMOL TABLETS 750 MG: 500 TABLET, COATED ORAL at 05:24

## 2021-05-14 RX ADMIN — BUPROPION HYDROCHLORIDE 300 MG: 150 TABLET, EXTENDED RELEASE ORAL at 09:47

## 2021-05-14 RX ADMIN — POTASSIUM CHLORIDE 20 MEQ: 1500 TABLET, EXTENDED RELEASE ORAL at 17:13

## 2021-05-14 RX ADMIN — NYSTATIN: 100000 POWDER TOPICAL at 09:52

## 2021-05-14 RX ADMIN — POTASSIUM CHLORIDE 20 MEQ: 1500 TABLET, EXTENDED RELEASE ORAL at 09:49

## 2021-05-14 RX ADMIN — DABIGATRAN ETEXILATE MESYLATE 150 MG: 150 CAPSULE ORAL at 09:48

## 2021-05-14 RX ADMIN — Medication 1 TABLET: at 09:47

## 2021-05-14 RX ADMIN — DOCUSATE SODIUM 100 MG: 100 CAPSULE ORAL at 17:13

## 2021-05-14 RX ADMIN — HYDROXYZINE HYDROCHLORIDE 25 MG: 25 TABLET ORAL at 22:48

## 2021-05-14 RX ADMIN — HYDROXYZINE HYDROCHLORIDE 25 MG: 25 TABLET ORAL at 05:24

## 2021-05-14 RX ADMIN — METHOCARBAMOL TABLETS 750 MG: 500 TABLET, COATED ORAL at 16:29

## 2021-05-14 RX ADMIN — HYDROXYZINE HYDROCHLORIDE 25 MG: 25 TABLET ORAL at 16:29

## 2021-05-14 NOTE — CASE MANAGEMENT
CM s/w [facility] from [employer], unable to arrange transport for today  Per [facility], transportation unlikely for tomorrow due to patient's size  [facility] requesting plan to discharge patient tentatively tomorrow, however more likely on Sunday (5/16)  Patient's hip-to-hip measurements are 44" at this time       CM notified [place] via [facility]  On-site team aware  CM will continue to follow

## 2021-05-14 NOTE — DISCHARGE SUMMARY
Adela U  67   Discharge- Jayro Pruitt 1970, 49 y o  male MRN: [id number]  Unit/Bed#: -01 Encounter: [id number]  Primary Care Provider: Maryuri Liriano MD   Date and time admitted to hospital: 5/10/2021  9:27 AM    Acute on chronic diastolic congestive heart failure [facility])  Assessment & Plan  Wt Readings from Last 3 Encounters:   05/14/21 (!) 240 kg (530 lb 3 3 oz)   06/15/20 (!) 161 kg (355 lb 3 2 oz)   04/18/19 (!) 189 kg (417 lb)     · Patient presented with SOB, started few days ago worsened on the morning of admission   · Associated symptoms : chest heaviness, orthopnea, PND, decreased urination with the current dose of lasix, increase in cough and sputum production, palpitations, subjective feeling of "fluid in his body / chest"   · No fever, chills, URTI symptoms, calf swelling / pain above baseline, no wheezing, change in color / consistency / character of sputum   · Vitals : saturating at 99% on baseline oxygen of 2 L, intermittent episodes of tachycardia  Monitor showed Afib with RVR - HR - sometimes increasing to 120s   · Labs : normal troponin, mildly elevated BNP - 125 ( patient is morbidly obese), elevated bicarbonate, normal renal function, no leukocytosis   · Chest CTA : ruled out PE, reported diffuse groundglass opacities -- could represent pulmonary edema or infectious / inflammatory process  · Etiology of SOB likely CHF exacerbation, in the setting of tachyarrhythmia  · Patient received 20 mg IV lasix dose on the day of admission due to soft BP - had a urine output of 850 ml within 1 5 hours  · Patient had hypotensive episodes yesterday - lowest - 98 / 49 however BP measurement could be inaccurate given his body habitus   · Patient is supposed to be on a 40 mg PO BID dose at the nursing facility, however is only on 20 mg PO BID as per nursing home medication records   · Patient feels subjectively better - reports improvement in SOB    · I/O - 24 hr UO - 1 8 L, intake is not being documented  Total amount diuresed since admission is about 7 L  Weights are inaccurate  · Received 2 doses of 40 mg IV lasix yesterday and one dose of 500 mg IV diamox  · Cardiology on board      PLAN :   · See further management under "acute on chronic diastolic CHF"   · Continue oxygen supplementation via nasal cannula to maintain sats of 88-92%   · Continue home inhaler - Symbicort, albuterol nebs PRN    Patient will switch to p o  Lasix 40mg bid , be this patient was on 20 mg b i d  At the nursing facility and also will give potassium supplement and repeat K level in 2 days  COPD (chronic obstructive pulmonary disease) ([facility])  Assessment & Plan  · Known case of COPD, former smoker, quit about 6 years ago, used to smoke 1 ppd x 20 years   · Is on symbicort at the nursing home   · Reports increased cough with increase  In amount of whitish sputum production, no change in color / consistency   · No wheeze heard in physical exam, however diminished breath sound and difficult to appreciate due to body habitus     · Patient has no mental status abnormalities, is alert and oriented x 3      PLAN :  · Continue symbicort while inpatient   · Continue albuterol nebs PRN        Atrial fibrillation ([facility])  Assessment & Plan  · Patient is a known case of atrial fibrillation, on anticoagulation with pradaxa   · Is also on digoxin 0 125 mg PO daily, and metoprolol tartrate 25 mg PO q12h   · Patient presented with SOB, chest discomfort but no hypoxia   · HR on admission - 109, had intermittent tachycardic episodes in the 110-120s   · Digoxin level - 0 8   · S/p 12 5 mg PO Lopressor x 1 dose at the time of admission due to soft Bps ( SBP - 100s )   · Telemetry overnight -- HR - 90s     PLAN :   · Continue Telemetry monitoring   · Continue metoprolol tartrate at a dose of 25 mg PO q12h   · Continue digoxin at home dose   · Keep magnesium > 2, potassium > 4   · Continue pradaxa at home dose for anticoagulation        DVT (deep venous thrombosis) (HCC)  Assessment & Plan  · H/o DVT and PE, s/p IVC filter insertion in 2019   · Patient was supposed to have the filter removed, but could not, was referred to [facility] but he did not have that done as he switched insurance   · Is on anticoagulation with Pradaxa for Afib      · PLAN:   Continue anticoagulation with Pradaxa     Morbid obesity ([address] )  Assessment & Plan  · S/p gastric sleeve surgery in 2018   · Patient used to weigh approximately 650 pounds, lost about 250-260 pounds and weight has been stable since  · As per nursing home staff, patient refuses to be weighed regularly but they do estimate a significant weight gain  · Patient reported to cardiology that he at least gained about 20 lbs   · Patient follows with bariatric surgery - last seen on 04/21      PLAN :   · Continue outpatient management and follow up with bariatric surgery               Resolved Problems  Date Reviewed: 6/15/2020    None          Admission Date:   Admission Orders (From admission, onward)     Ordered        05/11/21 1817  Inpatient Admission  Once         05/10/21 1242  Place in Observation  Once                     Admitting Diagnosis: Shortness of breath [R06 02]  SOB (shortness of breath) [R06 02]  Obesity [E66 9]  COPD exacerbation (HCC) [J44 1]  Chest pain, unspecified type [R07 9]    HPI: Patient is a 48 y/o male with a PMH of obesity hypoventilation syndrome, chronic hypoxic hypercapneic respiratory failure - on 2 L oxygen supplementation via nasal cannula, morbid obesity s/p gastric sleeve surgery in 2018, atrial fibrillation on anticoagulation with Pradaxa, right sided CHF, chronic lower extremity edema, GERD, COPD, anxiety/ depression, obstructive sleep apnea, H/o DVT/ PE, HTN who presented to the ED from [address] with shortness of breath       Patient's symptoms started a few days ago, however worsened this morning prompting him to come to the ED   He described it as "a heavy weight on his chest", making it difficult for him to breathe  He notices slight improvement in breathing while sitting upright, has had episodes in the past few days where he woke up in the middle of the night with SOB (PND)  He denies any recent increase in the number of pillows used, but does report that he "feels fluid in his body/ lungs"  As per nursing staff, patient refuses to be weighed daily, but seems like he gained weight  He is on chronic 2 L oxygen supplementation, with no increase in requirement  He has SOB, chronic productive of whitish sputum at baseline, and has noticed an increase in the amount of sputum, but no change in color / character / consistency  He does feel that he has been "urinating less" with the current dose of lasix for the past 1 week       He denies fever, chills, exposure to sick contacts, nausea, vomiting, change in appetite, urinary symptoms like dysuria, calf pain/ swelling above his baseline  He does have chronic abdominal pain from a an abdominal wall hernia which is being managed as outpatient  As per nursing staff, patient did not really complain of anything until this morning when he complained of SOB  He was partially mobile before, but since he got COVID in December 2020 -- he has been pretty much bed bound  He is on a regular diet at the nursing facility       In the ED,   Initial vitals : afebrile / HR - 99 bpm / RR - 18/ BP - 121/ 41 mmHg / saturating at 99 % on 2 L oxygen via nasal cannula  Labs : No leukocytosis, elevated bicarbonate - 42, which is an expected compensatory response to his chronic CO2 retention, normal renal function       COVID - negative  BNP - 125 1, but the fact that the patient is morbidly obese needs to be factored in which could under estimate his true BNP    Troponin - < 0 03       Imaging :   CT chest with IV contrast : ruled out PE, however reported diffuse ground glass opacities which could represent pulmonary edema / infectious / inflammatory process  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  CXR - cardiomegaly, with no obvious focal consolidation      Patient is being admitted under observation for further evaluation of SOB, likely secondary to acute on chronic CHF         Procedures Performed:   Orders Placed This Encounter   Procedures    ED ECG Documentation Only       Summary of Hospital Course: pt was admitted with CHF  exacerbation with diastolic dysfunction and given iv lasix , pt diuresed well , and more than 10l negative and also weight change/loss noted and  Cardiology input obtained , pt clinically improved   2 d echo was obtained showed Systolic function was normal  Ejection fraction was estimated to be 60 %  This study was inadequate for the evaluation of regional wall motion  Wall thickness could not be accurately determined  pt had elevated bicarb level and given diamox   BP was on lower side 90s/ 40 s , lasix dose held and now BP improved   Plan was d/w cardiology and stable for discharge on po lasix   HEENT-PERRLA, moist oral mucosa  Neck-supple, no JVD elevation   Respiratory-equal air entry bilaterally, no rales or rhonchi  Cardiovascular system-S1, S2 heard, no murmur or gallops or rubs  Abdomen-soft, nontender, no guarding or rigidity, bowel sounds heard  Extremities-b/l pedal edema  Peripheral pulses palpable  Musculoskeletal-no contractures  Central nervous system-no acute focal neurological deficit ,no sensory or motor deficit noted  Skin-no rash noted        Significant Findings, Care, Treatment and Services Provided: iv lasix ,     Complications: nil    Condition at Discharge: fair         Discharge instructions/Information to patient and family:   See after visit summary for information provided to patient and family        Provisions for Follow-Up Care:  See after visit summary for information related to follow-up care and any pertinent home health orders  PCP: Nimo Harvey MD    Disposition: Skilled nursing facility at [facility]     Planned Readmission: No    Discharge Statement   I spent 45 minutes discharging the patient  This time was spent on the day of discharge  I had direct contact with the patient on the day of discharge  Additional documentation is required if more than 30 minutes were spent on discharge  Discharge Medications:  See after visit summary for reconciled discharge medications provided to patient and family

## 2021-05-14 NOTE — ASSESSMENT & PLAN NOTE
· H/o DVT and PE, s/p IVC filter insertion in 2019   · Patient was supposed to have the filter removed, but could not, was referred to [facility] but he did not have that done as he switched insurance   · Is on anticoagulation with Pradaxa for Afib      · PLAN:   Continue anticoagulation with Pradaxa

## 2021-05-14 NOTE — CASE MANAGEMENT
Transport request was sent to [facility] for transport home  CM s/w Lashell Abreu at [facility]  Per Lashell Abreu, there are major delays with bariatric transport at this time due to local EMS crews having equipment needing repairs/maintenance at this time and due to staffing  [facility] working on transport at this time, however unsure of ability to transport patient today  CM will continue to follow

## 2021-05-14 NOTE — PLAN OF CARE
Problem: Potential for Falls  Goal: Patient will remain free of falls  Description: INTERVENTIONS:  - Assess patient frequently for physical needs  -  Identify cognitive and physical deficits and behaviors that affect risk of falls    -  [place] fall precautions as indicated by assessment   - Educate patient/family on patient safety including physical limitations  - Instruct patient to call for assistance with activity based on assessment  - Modify environment to reduce risk of injury  - Consider OT/PT consult to assist with strengthening/mobility  Outcome: Progressing     Problem: Prexisting or High Potential for Compromised Skin Integrity  Goal: Skin integrity is maintained or improved  Description: INTERVENTIONS:  - Identify patients at risk for skin breakdown  - Assess and monitor skin integrity  - Assess and monitor nutrition and hydration status  - Monitor labs   - Assess for incontinence   - Turn and reposition patient  - Assist with mobility/ambulation  - Relieve pressure over bony prominences  - Avoid friction and shearing  - Provide appropriate hygiene as needed including keeping skin clean and dry  - Evaluate need for skin moisturizer/barrier cream  - Collaborate with interdisciplinary team   - Patient/family teaching  - Consider wound care consult   Outcome: Progressing     Problem: PAIN - ADULT  Goal: Verbalizes/displays adequate comfort level or baseline comfort level  Description: Interventions:  - Encourage patient to monitor pain and request assistance  - Assess pain using appropriate pain scale  - Administer analgesics based on type and severity of pain and evaluate response  - Implement non-pharmacological measures as appropriate and evaluate response  - Consider cultural and social influences on pain and pain management  - Notify physician/advanced practitioner if interventions unsuccessful or patient reports new pain  Outcome: Progressing     Problem: INFECTION - ADULT  Goal: Absence or prevention of progression during hospitalization  Description: INTERVENTIONS:  - Assess and monitor for signs and symptoms of infection  - Monitor lab/diagnostic results  - Monitor all insertion sites, i e  indwelling lines, tubes, and drains  - Monitor endotracheal if appropriate and nasal secretions for changes in amount and color  - [place] appropriate cooling/warming therapies per order  - Administer medications as ordered  - Instruct and encourage patient and family to use good hand hygiene technique  - Identify and instruct in appropriate isolation precautions for identified infection/condition  Outcome: Progressing     Problem: SAFETY ADULT  Goal: Patient will remain free of falls  Description: INTERVENTIONS:  - Assess patient frequently for physical needs  -  Identify cognitive and physical deficits and behaviors that affect risk of falls    -  [place] fall precautions as indicated by assessment   - Educate patient/family on patient safety including physical limitations  - Instruct patient to call for assistance with activity based on assessment  - Modify environment to reduce risk of injury  - Consider OT/PT consult to assist with strengthening/mobility  Outcome: Progressing  Goal: Maintain or return to baseline ADL function  Description: INTERVENTIONS:  -  Assess patient's ability to carry out ADLs; assess patient's baseline for ADL function and identify physical deficits which impact ability to perform ADLs (bathing, care of mouth/teeth, toileting, grooming, dressing, etc )  - Assess/evaluate cause of self-care deficits   - Assess range of motion  - Assess patient's mobility; develop plan if impaired  - Assess patient's need for assistive devices and provide as appropriate  - Encourage maximum independence but intervene and supervise when necessary  - Involve family in performance of ADLs  - Assess for home care needs following discharge   - Consider OT consult to assist with ADL evaluation and planning for discharge  - Provide patient education as appropriate  Outcome: Progressing  Goal: Maintain or return mobility status to optimal level  Description: INTERVENTIONS:  - Assess patient's baseline mobility status (ambulation, transfers, stairs, etc )    - Identify cognitive and physical deficits and behaviors that affect mobility  - Identify mobility aids required to assist with transfers and/or ambulation (gait belt, sit-to-stand, lift, walker, cane, etc )  - [place] fall precautions as indicated by assessment  - Record patient progress and toleration of activity level on Mobility SBAR; progress patient to next Phase/Stage  - Instruct patient to call for assistance with activity based on assessment  - Consider rehabilitation consult to assist with strengthening/weightbearing, etc   Outcome: Progressing     Problem: DISCHARGE PLANNING  Goal: Discharge to home or other facility with appropriate resources  Description: INTERVENTIONS:  - Identify barriers to discharge w/patient and caregiver  - Arrange for needed discharge resources and transportation as appropriate  - Identify discharge learning needs (meds, wound care, etc )  - Arrange for interpretive services to assist at discharge as needed  - Refer to Case Management Department for coordinating discharge planning if the patient needs post-hospital services based on physician/advanced practitioner order or complex needs related to functional status, cognitive ability, or social support system  Outcome: Progressing     Problem: Knowledge Deficit  Goal: Patient/family/caregiver demonstrates understanding of disease process, treatment plan, medications, and discharge instructions  Description: Complete learning assessment and assess knowledge base    Interventions:  - Provide teaching at level of understanding  - Provide teaching via preferred learning methods  Outcome: Progressing     Problem: CARDIOVASCULAR - ADULT  Goal: Maintains optimal cardiac output and hemodynamic stability  Description: INTERVENTIONS:  - Monitor I/O, vital signs and rhythm  - Monitor for S/S and trends of decreased cardiac output  - Administer and titrate ordered vasoactive medications to optimize hemodynamic stability  - Assess quality of pulses, skin color and temperature  - Assess for signs of decreased coronary artery perfusion  - Instruct patient to report change in severity of symptoms  Outcome: Progressing  Goal: Absence of cardiac dysrhythmias or at baseline rhythm  Description: INTERVENTIONS:  - Continuous cardiac monitoring, vital signs, obtain 12 lead EKG if ordered  - Administer antiarrhythmic and heart rate control medications as ordered  - Monitor electrolytes and administer replacement therapy as ordered  Outcome: Progressing     Problem: RESPIRATORY - ADULT  Goal: Achieves optimal ventilation and oxygenation  Description: INTERVENTIONS:  - Assess for changes in respiratory status  - Assess for changes in mentation and behavior  - Position to facilitate oxygenation and minimize respiratory effort  - Oxygen administered by appropriate delivery if ordered  - Initiate smoking cessation education as indicated  - Encourage broncho-pulmonary hygiene including cough, deep breathe, Incentive Spirometry  - Assess the need for suctioning and aspirate as needed  - Assess and instruct to report SOB or any respiratory difficulty  - Respiratory Therapy support as indicated  Outcome: Progressing

## 2021-05-14 NOTE — UTILIZATION REVIEW
Continued Stay Review    Date: 5/14/21 Friday                         Current Patient Class: INPATIENT  Current Level of Care:  ACUTE MED SURG    HPI:  48 y/o male with a PMHx morbid obesity s/p gastric sleeve surgery in 2018,  HTN,  COPD/ FILIPPO/  hypoventilation syndrome, chronic hypoxic hypercapneic respiratory failure - on 2 L oxygen, atrial fibrillation on Pradaxa, right sided CHF, chronic lower extremity edema, DVT/ PE, GERD,  anxiety/ depression; recent COVID Infx Dec 2020     [facility] to [facility] ED from NH/ [facility] 2nd few day history of  worsening SOB with chest heaviness  Placed in Observation 5/10/21 at 1242 and converted to Inpatient 5/11/21 at 1817 2nd SOB - Acute on Chronic Diastolic CHF - Per Cardiology requires IV Lasix q8hrs      5/12 Cardiology: Clinically improved   Diuresing very well   Negative balance of over 4 L    hypotensive episodes on 5/12 to a low of 98/49     5/13:  significant diuretic response  Plan one additional dose of diamox 250 mg iv, and continue Lasix iv at the current dosing  5/14:   Feels better - improvement in SOB    Diuresing well  BP improved  SpO2 99% on baseline oxygen of 2 L,     Plan:  IV furosemide 40 mg BID - plan switch to oral Lasix 40mg bid   anticipate discharge back to NH today      Vital Signs:   05/14/21 1417  97 1 °F (36 2 °C)Abnormal   64  18  105/58  --  96 %  --  --  --  --  --  Lying   05/14/21 0950  --  99  18  137/61  84  97 %  --  --  --  --  --  Lying   05/14/21 0803  --  --  --  --  --  96 %  --  28  2 L/min  Nasal cannula  --  --   05/14/21 0716  97 2 °F (36 2 °C)Abnormal   87  19  104/47Abnormal   --  99 %  --  --  --  --  --  Lying   05/14/21 0420  --  --  --  --  --  --  --  --  --  --  Face mask  --   05/13/21 2224  --  --  --  --  --  --  35  --  --  --  --  --   05/13/21 2157  97 8 °F (36 6 °C)  86  18  117/62  --  96 %  --  28  2 L/min  Nasal cannula  --  Lying   05/13/21 2005  --  --  --  --  --  95 %  --  28  2 L/min  Nasal cannula  --  --   05/13/21 1748  --  94  18  117/63  --  96 %  --  --  --  --  --  Lying   05/13/21 1509  97 °F (36 1 °C)Abnormal   57  17  90/55  --  96 %  --  --  --  --  --  Lying   05/13/21 1130  98 1 °F (36 7 °C)  85  19  90/67  75  96 %  --  --  --  --  --  Lying     I/O 05/12 0701 05/13 0700 05/13 0701 05/14 0700 05/14 0701      P O   1197    Total Intake(mL/kg)  1197 (5)    Urine (mL/kg/hr) 1850 (0 3) 1350 (0 2) 400 (0 2)   Total Output 1850 1350 400   Net -1850 -153 -400     Wt Readings from Last 3 Encounters:   05/14/21 (!) 240 kg (530 lb 3 3 oz)   06/15/20 (!) 161 kg (355 lb 3 2 oz)   04/18/19 (!) 189 kg (417 lb)     Pertinent Labs/Diagnostic Results:   Results from last 7 days   Lab Units 05/14/21  1300 05/10/21  0950   SARS-COV-2  Negative Negative     Results from last 7 days   Lab Units 05/13/21  0453 05/12/21  0521 05/11/21  0653 05/10/21  0950   WBC Thousand/uL 6 12 5 35 5 39 8 10   HEMOGLOBIN g/dL 13 8 13 4 12 7 14 2   HEMATOCRIT % 43 1 42 0 39 9 44 3   PLATELETS Thousands/uL 169 147* 168 198   NEUTROS ABS Thousands/µL 3 99 3 54 3 66 5 93       Results from last 7 days   Lab Units 05/13/21  0453 05/12/21  1057 05/12/21  0521 05/11/21  0653 05/10/21  0950   SODIUM mmol/L 137  --  141 141 141   POTASSIUM mmol/L 3 4*  --  3 4* 3 8 4 3   CHLORIDE mmol/L 94*  --  92* 95* 94*   CO2 mmol/L 36*  --  45* 43* 42*   ANION GAP mmol/L 7  --  4 3* 5   BUN mg/dL 16  --  15 15 18   CREATININE mg/dL 0 87  --  0 84 0 79 0 84   EGFR ml/min/1 73sq m 101  --  102 105 102   CALCIUM mg/dL 9 5  --  9 3 8 9 9 3   CALCIUM, IONIZED mmol/L  --  1 15  --   --   --    MAGNESIUM mg/dL 2 2  --  2 1 1 9 2 0     Diet Cardiovascular; Cardiac; Fluid Restriction 1500 ML     Scheduled Medications:  artificial tear, , Both Eyes, HS  budesonide-formoterol, 2 puff, Inhalation, BID  buPROPion, 300 mg, Oral, Daily  calcium carbonate-vitamin D, 1 tablet, Oral, Daily With Breakfast  cholecalciferol, 5,000 Units, Oral, Daily  dabigatran etexilate, 150 mg, Oral, Q12H [address]  digoxin, 125 mcg, Oral, Daily  docusate sodium, 100 mg, Oral, BID  fluticasone, 1 spray, Nasal, BID  IV furosemide, 40 mg, Intravenous, BID   hydrOXYzine HCL, 25 mg, Oral, Q8H MARY  methocarbamol, 750 mg, Oral, Q8H MARY  metoprolol tartrate, 25 mg, Oral, Q12H MARY  nystatin, , Topical, BID  potassium chloride, 20 mEq, Oral, BID  senna, 1 tablet, Oral, HS  spironolactone, 50 mg, Oral, Daily  traZODone, 150 mg, Oral, HS     PRN Meds:  acetaminophen, 650 mg, Oral, Q6H PRN GIVEN X 1/ 24 HRS  albuterol, 2 5 mg, Nebulization, Q6H PRN  ammonium lactate, , Topical, BID PRN  metoprolol, 2 5 mg, Intravenous, Q6H PRN    Discharge Plan: To be determined   Inpatient Case Management following for all discharge needs    Network Utilization Review Department  ATTENTION: Please call with any questions or concerns to [phone] and carefully listen to the prompts so that you are directed to the right person  All voicemails are confidential   Donna Costa all requests for admission clinical reviews, approved or denied determinations and any other requests to dedicated fax number below belonging to the campus where the patient is receiving treatment   List of dedicated fax numbers for the Facilities:  [address] DENIALS (Administrative/Medical Necessity) [phone]   [address] (Maternity/NICU/Pediatrics) [phone]   [address] [place] [address] [address] [place] [id number] [address] [phone]   [place] [address] [place] [address] [phone]

## 2021-05-14 NOTE — ASSESSMENT & PLAN NOTE
Wt Readings from Last 3 Encounters:   05/14/21 (!) 240 kg (530 lb 3 3 oz)   06/15/20 (!) 161 kg (355 lb 3 2 oz)   04/18/19 (!) 189 kg (417 lb)     · Patient presented with SOB, started few days ago worsened on the morning of admission   · Associated symptoms : chest heaviness, orthopnea, PND, decreased urination with the current dose of lasix, increase in cough and sputum production, palpitations, subjective feeling of "fluid in his body / chest"   · No fever, chills, URTI symptoms, calf swelling / pain above baseline, no wheezing, change in color / consistency / character of sputum   · Vitals : saturating at 99% on baseline oxygen of 2 L, intermittent episodes of tachycardia  Monitor showed Afib with RVR - HR - sometimes increasing to 120s   · Labs : normal troponin, mildly elevated BNP - 125 ( patient is morbidly obese), elevated bicarbonate, normal renal function, no leukocytosis   · Chest CTA : ruled out PE, reported diffuse groundglass opacities -- could represent pulmonary edema or infectious / inflammatory process  · Etiology of SOB likely CHF exacerbation, in the setting of tachyarrhythmia  · Patient received 20 mg IV lasix dose on the day of admission due to soft BP - had a urine output of 850 ml within 1 5 hours  · Patient had hypotensive episodes yesterday - lowest - 98 / 49 however BP measurement could be inaccurate given his body habitus   · Patient is supposed to be on a 40 mg PO BID dose at the nursing facility, however is only on 20 mg PO BID as per nursing home medication records   · Patient feels subjectively better - reports improvement in SOB  · I/O - 24 hr UO - 1 8 L, intake is not being documented  Total amount diuresed since admission is about 7 L  Weights are inaccurate  · Received 2 doses of 40 mg IV lasix yesterday and one dose of 500 mg IV diamox     · Cardiology on board      PLAN :   · See further management under "acute on chronic diastolic CHF"   · Continue oxygen supplementation via nasal cannula to maintain sats of 88-92%   · Continue home inhaler - Symbicort, albuterol nebs PRN    Patient will switch to p o  Lasix 40mg bid , be this patient was on 20 mg b i d  At the nursing facility and also will give potassium supplement and repeat K level in 2 days

## 2021-05-15 PROCEDURE — 94640 AIRWAY INHALATION TREATMENT: CPT

## 2021-05-15 PROCEDURE — 99233 SBSQ HOSP IP/OBS HIGH 50: CPT | Performed by: INTERNAL MEDICINE

## 2021-05-15 PROCEDURE — 94668 MNPJ CHEST WALL SBSQ: CPT

## 2021-05-15 PROCEDURE — 94760 N-INVAS EAR/PLS OXIMETRY 1: CPT

## 2021-05-15 PROCEDURE — 94660 CPAP INITIATION&MGMT: CPT

## 2021-05-15 RX ADMIN — Medication 1 TABLET: at 08:28

## 2021-05-15 RX ADMIN — DOCUSATE SODIUM 100 MG: 100 CAPSULE ORAL at 09:25

## 2021-05-15 RX ADMIN — DABIGATRAN ETEXILATE MESYLATE 150 MG: 150 CAPSULE ORAL at 21:11

## 2021-05-15 RX ADMIN — DABIGATRAN ETEXILATE MESYLATE 150 MG: 150 CAPSULE ORAL at 09:23

## 2021-05-15 RX ADMIN — HYDROXYZINE HYDROCHLORIDE 25 MG: 25 TABLET ORAL at 21:11

## 2021-05-15 RX ADMIN — HYDROXYZINE HYDROCHLORIDE 25 MG: 25 TABLET ORAL at 14:51

## 2021-05-15 RX ADMIN — METHOCARBAMOL TABLETS 750 MG: 500 TABLET, COATED ORAL at 14:51

## 2021-05-15 RX ADMIN — POTASSIUM CHLORIDE 20 MEQ: 1500 TABLET, EXTENDED RELEASE ORAL at 09:24

## 2021-05-15 RX ADMIN — METOPROLOL TARTRATE 25 MG: 25 TABLET, FILM COATED ORAL at 21:12

## 2021-05-15 RX ADMIN — NYSTATIN: 100000 POWDER TOPICAL at 18:25

## 2021-05-15 RX ADMIN — POTASSIUM CHLORIDE 20 MEQ: 1500 TABLET, EXTENDED RELEASE ORAL at 18:24

## 2021-05-15 RX ADMIN — FUROSEMIDE 40 MG: 10 INJECTION, SOLUTION INTRAMUSCULAR; INTRAVENOUS at 08:28

## 2021-05-15 RX ADMIN — FUROSEMIDE 40 MG: 10 INJECTION, SOLUTION INTRAMUSCULAR; INTRAVENOUS at 16:50

## 2021-05-15 RX ADMIN — SPIRONOLACTONE 50 MG: 25 TABLET, FILM COATED ORAL at 09:25

## 2021-05-15 RX ADMIN — METHOCARBAMOL TABLETS 750 MG: 500 TABLET, COATED ORAL at 21:11

## 2021-05-15 RX ADMIN — NYSTATIN: 100000 POWDER TOPICAL at 14:51

## 2021-05-15 RX ADMIN — Medication 5000 UNITS: at 09:25

## 2021-05-15 RX ADMIN — HYDROXYZINE HYDROCHLORIDE 25 MG: 25 TABLET ORAL at 06:27

## 2021-05-15 RX ADMIN — BUDESONIDE AND FORMOTEROL FUMARATE DIHYDRATE 2 PUFF: 160; 4.5 AEROSOL RESPIRATORY (INHALATION) at 19:44

## 2021-05-15 RX ADMIN — DOCUSATE SODIUM 100 MG: 100 CAPSULE ORAL at 18:24

## 2021-05-15 RX ADMIN — ACETAMINOPHEN 650 MG: 325 TABLET, FILM COATED ORAL at 21:11

## 2021-05-15 RX ADMIN — ACETAMINOPHEN 650 MG: 325 TABLET, FILM COATED ORAL at 08:28

## 2021-05-15 RX ADMIN — WHITE PETROLATUM 57.7 %-MINERAL OIL 31.9 % EYE OINTMENT 1 APPLICATION: at 21:14

## 2021-05-15 RX ADMIN — DIGOXIN 125 MCG: 125 TABLET ORAL at 09:24

## 2021-05-15 RX ADMIN — FLUTICASONE PROPIONATE 1 SPRAY: 50 SPRAY, METERED NASAL at 18:25

## 2021-05-15 RX ADMIN — METHOCARBAMOL TABLETS 750 MG: 500 TABLET, COATED ORAL at 06:27

## 2021-05-15 RX ADMIN — BUDESONIDE AND FORMOTEROL FUMARATE DIHYDRATE 2 PUFF: 160; 4.5 AEROSOL RESPIRATORY (INHALATION) at 08:15

## 2021-05-15 RX ADMIN — TRAZODONE HYDROCHLORIDE 150 MG: 100 TABLET ORAL at 21:11

## 2021-05-15 RX ADMIN — STANDARDIZED SENNA CONCENTRATE 8.6 MG: 8.6 TABLET ORAL at 21:12

## 2021-05-15 RX ADMIN — BUPROPION HYDROCHLORIDE 300 MG: 150 TABLET, EXTENDED RELEASE ORAL at 09:23

## 2021-05-15 RX ADMIN — FLUTICASONE PROPIONATE 1 SPRAY: 50 SPRAY, METERED NASAL at 14:51

## 2021-05-15 RX ADMIN — METOPROLOL TARTRATE 25 MG: 25 TABLET, FILM COATED ORAL at 09:24

## 2021-05-15 NOTE — PROGRESS NOTES
Adela U  67   Progress Note - Jayro Pruitt 1970, 49 y o  male MRN: [id number]  Unit/Bed#: -01 Encounter: [id number]  Primary Care Provider: Maryuri Liriano MD   Date and time admitted to hospital: 5/10/2021  9:27 AM    QT prolongation  Assessment & Plan  · Patient presented with a prolonged QTc of 537 ms  · On telemetry monitoring   · Serum potassium is 3 4, magnesium is 2 2    PLAN :   · Avoid QTc prolonging agents   · Continue telemetry monitoring       Hypothyroidism  Assessment & Plan  · Patient has a history of hypothyroidism, was on levothyroxine 25 mcg PO daily   · This medication is not his list of medications that he takes at the nursing home   · Last TSH in August 2020 - 1 75   · TSH is normal at 3 2 on 05/10    PLAN :   · At the nursing home, patient is not on levothyroxine-- will continue to hold off     COPD (chronic obstructive pulmonary disease) ([facility])  Assessment & Plan  · Known case of COPD, former smoker, quit about 6 years ago, used to smoke 1 ppd x 20 years   · Is on symbicort at the nursing home   · Reports increased cough with increase  In amount of whitish sputum production, no change in color / consistency   · No wheeze heard in physical exam, however diminished breath sound and difficult to appreciate due to body habitus     · Patient has no mental status abnormalities, is alert and oriented x 3     PLAN :  · Continue symbicort while inpatient   · Continue albuterol nebs PRN       Atrial fibrillation ([facility])  Assessment & Plan  · Patient is a known case of atrial fibrillation, on anticoagulation with pradaxa   · Is also on digoxin 0 125 mg PO daily, and metoprolol tartrate 25 mg PO q12h   · Patient presented with SOB, chest discomfort but no hypoxia   · HR on admission - 109, had intermittent tachycardic episodes in the 110-120s   · Digoxin level - 0 8   · S/p 12 5 mg PO Lopressor x 1 dose at the time of admission due to soft Bps ( SBP - 100s )   · Telemetry overnight -- HR - 90s    PLAN :   · Continue Telemetry monitoring   · Continue metoprolol tartrate at a dose of 25 mg PO q12h   · Continue digoxin at home dose   · Keep magnesium > 2, potassium > 4   · Continue pradaxa at home dose for anticoagulation       DVT (deep venous thrombosis) ([facility])  Assessment & Plan  · H/o DVT and PE, s/p IVC filter insertion in 2019   · Patient was supposed to have the filter removed, but could not, was referred to [facility] but he did not have that done as he switched insurance   · Is on anticoagulation with Pradaxa for Afib     · PLAN:   Continue anticoagulation with Pradaxa     Pulmonary artery aneurysm [facility])  Assessment & Plan  · Pulmonary artery aneurysm detected on CTA   · In comparison to imaging obtained in 2017, size increased from 4 9 cm --> 5 4 cm     PLAN :   · Continue outpatient management     Acute on chronic diastolic congestive heart failure [facility])  Assessment & Plan  Wt Readings from Last 3 Encounters:   05/15/21 (!) 237 kg (523 lb 2 4 oz)   06/15/20 (!) 161 kg (355 lb 3 2 oz)   04/18/19 (!) 189 kg (417 lb)     · Followed up with cardiology as outpatient - last seen in Jan 2021   · As per chart review, echo reports were reviewed  Prior ECHO (2017, no recent ECHO available) results indicate poor endocardial definition, but overall normal systolic function   · As per chart review cardiology advised to continue lasix 40 mg PO BID, however patient has been only on 20 mg PO BID at the nursing facility as per nursing home medication records   · S/p 20 mg IV lasix dose at the time of admission - had a urinary output of 850 ml within 1 5 hours of dose administration  20 mg IV dose was chosen as the patient had soft BP ( in the 100s SBP )   · Transthoracic echo--estimated EF - 60 %, study was inadequate for RWMA assessment  Upon personal review by cardiologist, patient seemed to have a good RV function with no dilatation  CHF is likely impaired diastolic function of the LV  · Patient feels subjectively better - reports improvement in SOB  · I/O - 24 hr UO - 1 3 L, intake is not being documented  Total amount diuresed since admission is about 9 L  Weights are inaccurate  · Received 40 mg IV lasix bid and one dose of 500 mg IV diamox  · Pt will need to be discharged on at least 40 mg PO BID lasix dose   · Patient was discharged to yesterday, hold due to transportation  · Continue fluid restriction, strict I/O charting, daily weights  · Will check BMP  · Needs to be on a strict low-salt diet at the time of discharge   · Outpatient follow up within 1 week at the cardiology office             Obesity hypoventilation syndrome ([place] [address] )  Assessment & Plan  · K/C/O obesity hypoventilation syndrome   · Follows a pulmonologist as outpatient - last seen in March 2021   · Previous medical history is also suggestive of severe FILIPPO ( based on home sleep study ) and severe restrictive pattern on PFT   · Patient is on continuous oxygen supplementation of 2 L   · Was advised during previous office visit to strictly use BiPAP at night for maximum benefit and weight loss --patient reports that he has not been fully compliant with his BiPAP use  · Labs : elevated bicarbonate- 45, is likely compensation for his chronic CO2 retention   · Metabolic alkalosis is worsening due to aggressive diuresis leading to volume contraction   · VBG - pCO2 - 63, patient is AAO x 3     PLAN:   · Continue BiPAP HS and PRN - respiratory therapy on board     Morbid obesity ([place] [address] )  Assessment & Plan  · S/p gastric sleeve surgery in 2018   · Patient used to weigh approximately 650 pounds, lost about 250-260 pounds and weight has been stable since  · As per nursing home staff, patient refuses to be weighed regularly but they do estimate a significant weight gain     · Patient reported to cardiology that he at least gained about 20 lbs   · Patient follows with bariatric surgery - last seen on 04/21     PLAN :   · Continue outpatient management and follow up with bariatric surgery         VTE Pharmacologic Prophylaxis:   Pharmacologic: Dabigatran (Pradaxa)  Mechanical VTE Prophylaxis in Place: Yes    Discussions with Specialists or Other Care Team Provider:  None    Education and Discussions with Family / Patient:  Patient    Current Length of Stay: 4 day(s)    Current Patient Status: Inpatient     Discharge Plan / Estimated Discharge Date:  Tomorrow    Code Status: Level 1 - Full Code      Subjective:   Patient feels okay  Patient denies shortness breath on 2 L of nasal cannula  Patient denies chest pain, fever chills, abdominal pain  Objective:     Vitals:   Temp (24hrs), Av 3 °F (36 3 °C), Min:97 1 °F (36 2 °C), Max:97 4 °F (36 3 °C)    Temp:  [97 1 °F (36 2 °C)-97 4 °F (36 3 °C)] 97 4 °F (36 3 °C)  HR:  [57-98] 95  Resp:  [18-20] 20  BP: ()/(53-58) 130/53  SpO2:  [95 %-98 %] 95 %  Body mass index is 74 kg/m²  Input and Output Summary (last 24 hours): Intake/Output Summary (Last 24 hours) at 5/15/2021 1333  Last data filed at 5/15/2021 0443  Gross per 24 hour   Intake 240 ml   Output 1225 ml   Net -985 ml       Physical Exam:     Physical Exam  Constitutional:       General: He is not in acute distress  Appearance: He is well-developed  He is obese  He is not diaphoretic  HENT:      Head: Normocephalic  Mouth/Throat:      Pharynx: No oropharyngeal exudate  Eyes:      Pupils: Pupils are equal, round, and reactive to light  Neck:      Musculoskeletal: Normal range of motion  Cardiovascular:      Rate and Rhythm: Normal rate  Rhythm irregular  Heart sounds: No murmur  Pulmonary:      Effort: Pulmonary effort is normal  No respiratory distress  Breath sounds: No wheezing or rales  Abdominal:      General: Bowel sounds are normal       Palpations: Abdomen is soft  Tenderness: There is no abdominal tenderness  Musculoskeletal: Normal range of motion           General: No tenderness  Skin:     General: Skin is warm  Neurological:      Mental Status: He is alert and oriented to person, place, and time  Additional Data:     Labs:    Results from last 7 days   Lab Units 05/13/21  0453   WBC Thousand/uL 6 12   HEMOGLOBIN g/dL 13 8   HEMATOCRIT % 43 1   PLATELETS Thousands/uL 169   NEUTROS PCT % 66   LYMPHS PCT % 23   MONOS PCT % 9   EOS PCT % 2     Results from last 7 days   Lab Units 05/13/21  0453  05/10/21  0950   POTASSIUM mmol/L 3 4*   < > 4 3   CHLORIDE mmol/L 94*   < > 94*   CO2 mmol/L 36*   < > 42*   BUN mg/dL 16   < > 18   CREATININE mg/dL 0 87   < > 0 84   CALCIUM mg/dL 9 5   < > 9 3   ALK PHOS U/L  --   --  75 5   ALT U/L  --   --  10   AST U/L  --   --  11*    < > = values in this interval not displayed  * I Have Reviewed All Lab Data Listed Above  * Additional Pertinent Lab Tests Reviewed:  All Labs Within Last 24 Hours Reviewed    Imaging:    Imaging Reports Reviewed Today Include:  None  Imaging Personally Reviewed by Myself Includes:  None    Recent Cultures (last 7 days):           Last 24 Hours Medication List:   Current Facility-Administered Medications   Medication Dose Route Frequency Provider Last Rate    acetaminophen  650 mg Oral Q6H PRN Mraty Barksdale MD      albuterol  2 5 mg Nebulization Q6H PRN Marty Barksdale MD      ammonium lactate   Topical BID PRN John Stephens MD      artificial tear   Both Eyes HS Marty Barksdale MD      budesonide-formoterol  2 puff Inhalation BID Marty Barksdale MD      buPROPion  300 mg Oral Daily Marty Barksdale MD      calcium carbonate-vitamin D  1 tablet Oral Daily With Breakfast Marty Barksdale MD      cholecalciferol  5,000 Units Oral Daily Marty Barksdale MD      dabigatran etexilate  150 mg Oral Q12H [facility] Marty Barksdale MD      digoxin  125 mcg Oral Daily Marty Barksdale MD      docusate sodium  100 mg Oral BID Ferguson Pouch, MD Parvez Zelaya fluticasone  1 spray Nasal BID Melissa Benítez MD      furosemide  40 mg Intravenous BID (diuretic) Melissa Benítez MD      hydrOXYzine HCL  25 mg Oral Q8H Patricia Taylor MD      methocarbamol  750 mg Oral Q8H [address] Melissa Benítez MD      metoprolol  2 5 mg Intravenous Q6H PRN Melissa Benítez MD      metoprolol tartrate  25 mg Oral Q12H [address] Laura Erickson MD      nystatin   Topical BID Laura Erickson MD      potassium chloride  20 mEq Oral BID Melissa Benítez MD      senna  1 tablet Oral HS Melissa Benítez MD      spironolactone  50 mg Oral Daily Melissa Benítez MD      traZODone  150 mg Oral HS Melissa Benítez MD          Today, Patient Was Seen By: Patrick Franks MD    ** Please Note: This note has been constructed using a voice recognition system   **

## 2021-05-15 NOTE — ASSESSMENT & PLAN NOTE
· H/o DVT and PE, s/p IVC filter insertion in 2019   · Patient was supposed to have the filter removed, but could not, was referred to [facility] but he did not have that done as he switched insurance   · Is on anticoagulation with Pradaxa for Afib     · PLAN:   Continue anticoagulation with Pradaxa

## 2021-05-15 NOTE — UTILIZATION REVIEW
Continued Stay Review     Date: 21                          Current Patient Class: INPATIENT  Current Level of Care:  ACUTE MED SURG     HPI:  50 y/o male with a PMHx morbid obesity s/p gastric sleeve surgery in 2018,  HTN,  COPD/ FILIPPO/  hypoventilation syndrome, chronic hypoxic hypercapneic respiratory failure - on 2 L oxygen, atrial fibrillation on Pradaxa, right sided CHF, chronic lower extremity edema, DVT/ PE, GERD,  anxiety/ depression; recent COVID Infx Dec 2020     [facility] ED from NH/ [facility] 2nd few day history of  worsening SOB with chest heaviness  Placed in Observation 5/10/21 at 1242 and converted to Inpatient 21 at 1817 2nd SOB - Acute on Chronic Diastolic CHF - Per Cardiology requires IV Lasix q8hrs       Cardiology: Clinically improved   Diuresing very well   Negative balance of over 4 L    hypotensive episodes on  to a low of 98/49      :  significant diuretic response   Plan one additional dose of diamox 250 mg iv, and continue Lasix iv at the current dosing       :  improvement in SOB with good diuresis   SpO2 99% on baseline oxygen of 2 L,    Continued IV furosemide 40 mg BID   *Per CM - unable to arrange transportation to NH and unlikely for tomorrow due to patient's size  Patient's hip-to-hip measurements are 44" at this time       5/15:  diuresing well with IV Lasix BID  Unable to get blood work as patient has difficult stick despite attempt with arterial stick  Decreased air entry noted bilaterally    Extremities shows bilateral edema which is improved    *Tentative discharge back to [address] [place] [address]  or  when transportation can be arranged      Vital Signs:   Temp (24hrs), Av 3 °F (36 3 °C), Min:97 1 °F (36 2 °C), Max:97 4 °F (36 3 °C)   Temp:  [97 1 °F (36 2 °C)-97 4 °F (36 3 °C)] 97 4 °F (36 3 °C)  HR:  [57-98] 95  Resp:  [18-20] 20  BP: ()/(53-58) 130/53  SpO2:  [95 %-98 %] 95 %  Body mass index is 74 kg/m²       Intake/Output Summary (Last 24 hours) at 5/15/2021 1333:  Gross per 24 hour   Intake 240 ml   Output 1225 ml   Net -985 ml     Wt Readings from Last 3 Encounters:   05/15/21 (!) 237 kg (523 lb 2 4 oz)     Pertinent Labs/Diagnostic Results:   Results from last 7 days   Lab Units 05/14/21  1300 05/10/21  0950   SARS-COV-2  Negative Negative     Results from last 7 days   Lab Units 05/13/21  0453 05/12/21  0521 05/11/21  0653 05/10/21  0950   WBC Thousand/uL 6 12 5 35 5 39 8 10   HEMOGLOBIN g/dL 13 8 13 4 12 7 14 2   HEMATOCRIT % 43 1 42 0 39 9 44 3   PLATELETS Thousands/uL 169 147* 168 198   NEUTROS ABS Thousands/µL 3 99 3 54 3 66 5 93       Results from last 7 days   Lab Units 05/13/21  0453 05/12/21  1057 05/12/21  0521 05/11/21  0653 05/10/21  0950   SODIUM mmol/L 137  --  141 141 141   POTASSIUM mmol/L 3 4*  --  3 4* 3 8 4 3   CHLORIDE mmol/L 94*  --  92* 95* 94*   CO2 mmol/L 36*  --  45* 43* 42*   ANION GAP mmol/L 7  --  4 3* 5   BUN mg/dL 16  --  15 15 18   CREATININE mg/dL 0 87  --  0 84 0 79 0 84   EGFR ml/min/1 73sq m 101  --  102 105 102   CALCIUM mg/dL 9 5  --  9 3 8 9 9 3   CALCIUM, IONIZED mmol/L  --  1 15  --   --   --    MAGNESIUM mg/dL 2 2  --  2 1 1 9 2 0     Results from last 7 days   Lab Units 05/10/21  0950   AST U/L 11*   ALT U/L 10   ALK PHOS U/L 75 5   TOTAL PROTEIN g/dL 7 6   ALBUMIN g/dL 4 1   TOTAL BILIRUBIN mg/dL 0 78     Results from last 7 days   Lab Units 05/12/21  1053 05/11/21  1550   POC GLUCOSE mg/dl 123 108     Results from last 7 days   Lab Units 05/13/21  0453 05/12/21  0521 05/11/21  0653 05/10/21  0950   GLUCOSE RANDOM mg/dL 102 102 104 99            Diet Cardiovascular; Cardiac; Fluid Restriction 1500 ML     Scheduled Medications:  artificial tear, , Both Eyes, HS  budesonide-formoterol, 2 puff, Inhalation, BID  buPROPion, 300 mg, Oral, Daily  calcium carbonate-vitamin D, 1 tablet, Oral, Daily With Breakfast  cholecalciferol, 5,000 Units, Oral, Daily  dabigatran etexilate, 150 mg, Oral, Q12H [address]  digoxin, 125 mcg, Oral, Daily  docusate sodium, 100 mg, Oral, BID  fluticasone, 1 spray, Nasal, BID  IV furosemide, 40 mg, Intravenous, BID (diuretic)  hydrOXYzine HCL, 25 mg, Oral, Q8H MARY  methocarbamol, 750 mg, Oral, Q8H MARY  metoprolol tartrate, 25 mg, Oral, Q12H MARY  nystatin, , Topical, BID  potassium chloride, 20 mEq, Oral, BID  senna, 1 tablet, Oral, HS  spironolactone, 50 mg, Oral, Daily  traZODone, 150 mg, Oral, HS     PRN Meds:  acetaminophen, 650 mg, Oral, Q6H PRN GIVEN X 1/ 24 HRS  albuterol, 2 5 mg, Nebulization, Q6H PRN  ammonium lactate, , Topical, BID PRN  metoprolol, 2 5 mg, Intravenous, Q6H PRN    Discharge Plan: To be determined   Inpatient Case Management following for all discharge needs    Network Utilization Review Department  ATTENTION: Please call with any questions or concerns to [phone] and carefully listen to the prompts so that you are directed to the right person  All voicemails are confidential   Ty Limb all requests for admission clinical reviews, approved or denied determinations and any other requests to dedicated fax number below belonging to the campus where the patient is receiving treatment   List of dedicated fax numbers for the Facilities:  [address] DENIALS (Administrative/Medical Necessity) [phone]   [address] (Maternity/NICU/Pediatrics) [phone]   [address] [address] [facility] [address] [address] [facility] [phone] [address] [facility] [phone]

## 2021-05-15 NOTE — ASSESSMENT & PLAN NOTE
· Patient presented with a prolonged QTc of 537 ms  · On telemetry monitoring   · Serum potassium is 3 4, magnesium is 2 2    PLAN :   · Avoid QTc prolonging agents   · Continue telemetry monitoring

## 2021-05-15 NOTE — ASSESSMENT & PLAN NOTE
Wt Readings from Last 3 Encounters:   05/15/21 (!) 237 kg (523 lb 2 4 oz)   06/15/20 (!) 161 kg (355 lb 3 2 oz)   04/18/19 (!) 189 kg (417 lb)     · Followed up with cardiology as outpatient - last seen in Jan 2021   · As per chart review, echo reports were reviewed  Prior ECHO (2017, no recent ECHO available) results indicate poor endocardial definition, but overall normal systolic function   · As per chart review cardiology advised to continue lasix 40 mg PO BID, however patient has been only on 20 mg PO BID at the nursing facility as per nursing home medication records   · S/p 20 mg IV lasix dose at the time of admission - had a urinary output of 850 ml within 1 5 hours of dose administration  20 mg IV dose was chosen as the patient had soft BP ( in the 100s SBP )   · Transthoracic echo--estimated EF - 60 %, study was inadequate for RWMA assessment  Upon personal review by cardiologist, patient seemed to have a good RV function with no dilatation  CHF is likely impaired diastolic function of the LV  · Patient feels subjectively better - reports improvement in SOB  · I/O - 24 hr UO - 1 3 L, intake is not being documented  Total amount diuresed since admission is about 9 L  Weights are inaccurate  · Received 40 mg IV lasix bid and one dose of 500 mg IV diamox     · Pt will need to be discharged on at least 40 mg PO BID lasix dose   · Patient was discharged to yesterday, hold due to transportation  · Continue fluid restriction, strict I/O charting, daily weights  · Will check BMP  · Needs to be on a strict low-salt diet at the time of discharge   · Outpatient follow up within 1 week at the cardiology office

## 2021-05-16 VITALS
DIASTOLIC BLOOD PRESSURE: 77 MMHG | BODY MASS INDEX: 44.1 KG/M2 | SYSTOLIC BLOOD PRESSURE: 108 MMHG | HEIGHT: 71 IN | WEIGHT: 315 LBS | OXYGEN SATURATION: 96 % | HEART RATE: 72 BPM | RESPIRATION RATE: 21 BRPM | TEMPERATURE: 97.5 F

## 2021-05-16 LAB
ANION GAP SERPL CALCULATED.3IONS-SCNC: 4 MMOL/L (ref 4–13)
BASOPHILS # BLD AUTO: 0.03 THOUSANDS/ΜL (ref 0–0.1)
BASOPHILS NFR BLD AUTO: 1 % (ref 0–1)
BUN SERPL-MCNC: 18 MG/DL (ref 6–20)
CALCIUM SERPL-MCNC: 9.5 MG/DL (ref 8.4–10.2)
CHLORIDE SERPL-SCNC: 93 MMOL/L (ref 96–108)
CO2 SERPL-SCNC: 38 MMOL/L (ref 22–33)
CREAT SERPL-MCNC: 0.93 MG/DL (ref 0.5–1.2)
EOSINOPHIL # BLD AUTO: 0.17 THOUSAND/ΜL (ref 0–0.61)
EOSINOPHIL NFR BLD AUTO: 3 % (ref 0–6)
ERYTHROCYTE [DISTWIDTH] IN BLOOD BY AUTOMATED COUNT: 12.8 % (ref 11.6–15.1)
GFR SERPL CREATININE-BSD FRML MDRD: 95 ML/MIN/1.73SQ M
GLUCOSE SERPL-MCNC: 187 MG/DL (ref 65–140)
HCT VFR BLD AUTO: 42.2 % (ref 36.5–49.3)
HGB BLD-MCNC: 13.9 G/DL (ref 12–17)
IMM GRANULOCYTES # BLD AUTO: 0.01 THOUSAND/UL (ref 0–0.2)
IMM GRANULOCYTES NFR BLD AUTO: 0 % (ref 0–2)
LYMPHOCYTES # BLD AUTO: 1.19 THOUSANDS/ΜL (ref 0.6–4.47)
LYMPHOCYTES NFR BLD AUTO: 20 % (ref 14–44)
MCH RBC QN AUTO: 30.2 PG (ref 26.8–34.3)
MCHC RBC AUTO-ENTMCNC: 32.9 G/DL (ref 31.4–37.4)
MCV RBC AUTO: 92 FL (ref 82–98)
MONOCYTES # BLD AUTO: 0.35 THOUSAND/ΜL (ref 0.17–1.22)
MONOCYTES NFR BLD AUTO: 6 % (ref 4–12)
NEUTROPHILS # BLD AUTO: 4.07 THOUSANDS/ΜL (ref 1.85–7.62)
NEUTS SEG NFR BLD AUTO: 70 % (ref 43–75)
PLATELET # BLD AUTO: 185 THOUSANDS/UL (ref 149–390)
PMV BLD AUTO: 10.5 FL (ref 8.9–12.7)
POTASSIUM SERPL-SCNC: 4 MMOL/L (ref 3.5–5)
RBC # BLD AUTO: 4.61 MILLION/UL (ref 3.88–5.62)
SODIUM SERPL-SCNC: 135 MMOL/L (ref 133–145)
WBC # BLD AUTO: 5.82 THOUSAND/UL (ref 4.31–10.16)

## 2021-05-16 PROCEDURE — 99239 HOSP IP/OBS DSCHRG MGMT >30: CPT | Performed by: INTERNAL MEDICINE

## 2021-05-16 PROCEDURE — 94660 CPAP INITIATION&MGMT: CPT

## 2021-05-16 PROCEDURE — 94668 MNPJ CHEST WALL SBSQ: CPT

## 2021-05-16 PROCEDURE — 85025 COMPLETE CBC W/AUTO DIFF WBC: CPT | Performed by: INTERNAL MEDICINE

## 2021-05-16 PROCEDURE — 94760 N-INVAS EAR/PLS OXIMETRY 1: CPT

## 2021-05-16 PROCEDURE — 94640 AIRWAY INHALATION TREATMENT: CPT

## 2021-05-16 PROCEDURE — 80048 BASIC METABOLIC PNL TOTAL CA: CPT | Performed by: INTERNAL MEDICINE

## 2021-05-16 RX ADMIN — HYDROXYZINE HYDROCHLORIDE 25 MG: 25 TABLET ORAL at 07:02

## 2021-05-16 RX ADMIN — SPIRONOLACTONE 50 MG: 25 TABLET, FILM COATED ORAL at 08:20

## 2021-05-16 RX ADMIN — FLUTICASONE PROPIONATE 1 SPRAY: 50 SPRAY, METERED NASAL at 08:24

## 2021-05-16 RX ADMIN — BUPROPION HYDROCHLORIDE 300 MG: 150 TABLET, EXTENDED RELEASE ORAL at 08:20

## 2021-05-16 RX ADMIN — NYSTATIN: 100000 POWDER TOPICAL at 08:21

## 2021-05-16 RX ADMIN — DABIGATRAN ETEXILATE MESYLATE 150 MG: 150 CAPSULE ORAL at 08:20

## 2021-05-16 RX ADMIN — BUDESONIDE AND FORMOTEROL FUMARATE DIHYDRATE 2 PUFF: 160; 4.5 AEROSOL RESPIRATORY (INHALATION) at 10:01

## 2021-05-16 RX ADMIN — DOCUSATE SODIUM 100 MG: 100 CAPSULE ORAL at 08:20

## 2021-05-16 RX ADMIN — METHOCARBAMOL TABLETS 750 MG: 500 TABLET, COATED ORAL at 07:04

## 2021-05-16 RX ADMIN — METOPROLOL TARTRATE 25 MG: 25 TABLET, FILM COATED ORAL at 08:20

## 2021-05-16 RX ADMIN — Medication 5000 UNITS: at 08:20

## 2021-05-16 RX ADMIN — POTASSIUM CHLORIDE 20 MEQ: 1500 TABLET, EXTENDED RELEASE ORAL at 08:20

## 2021-05-16 RX ADMIN — Medication 1 TABLET: at 08:20

## 2021-05-16 RX ADMIN — DIGOXIN 125 MCG: 125 TABLET ORAL at 08:20

## 2021-05-16 NOTE — ASSESSMENT & PLAN NOTE
Wt Readings from Last 3 Encounters:   05/16/21 (!) 239 kg (526 lb 14 4 oz)   06/15/20 (!) 161 kg (355 lb 3 2 oz)   04/18/19 (!) 189 kg (417 lb)     · Followed up with cardiology as outpatient - last seen in Jan 2021   · As per chart review, echo reports were reviewed  Prior ECHO (2017, no recent ECHO available) results indicate poor endocardial definition, but overall normal systolic function   · As per chart review cardiology advised to continue lasix 40 mg PO BID, however patient has been only on 20 mg PO BID at the nursing facility as per nursing home medication records   · S/p 20 mg IV lasix dose at the time of admission - had a urinary output of 850 ml within 1 5 hours of dose administration  20 mg IV dose was chosen as the patient had soft BP ( in the 100s SBP )   · Transthoracic echo--estimated EF - 60 %, study was inadequate for RWMA assessment  Upon personal review by cardiologist, patient seemed to have a good RV function with no dilatation  CHF is likely impaired diastolic function of the LV  · Patient feels subjectively better - reports improvement in SOB  · I/O - 24 hr UO - 1 3 L, intake is not being documented  Total amount diuresed since admission is about 9 L  Weights are inaccurate  · Received 40 mg IV lasix bid and one dose of 500 mg IV diamox     · Pt will need to be discharged on at least 40 mg PO BID lasix dose   · Patient was discharged to yesterday, hold due to transportation  · Continue fluid restriction, strict I/O charting, daily weights  · Will check BMP  · Needs to be on a strict low-salt diet at the time of discharge   · Outpatient follow up within 1 week at the cardiology office

## 2021-05-16 NOTE — CASE MANAGEMENT
CM checked [address] referral and confirmed with attending Dr Maricruz Beavers that pt is going back to [address] via BLS at 12 noon  CM then checked [address] referral and was informed pt referral was accepted yesterday  CM called [address] and informed Nurse Supervisor Nani Neal of 12 noon  time for pt  MUNDO then sent Nurse Rg an updated medical necessity form with todays date via email and notified pt SO of  time via phone call  CM to continue to follow for DC needs

## 2021-05-16 NOTE — UTILIZATION REVIEW
Continued Stay Review  Date: 5/16/21   Current Patient Class: Inpatient    Current Level of Care: Acute Med Surg  HPI:  48 y/o male presented via EMS to [facility] ED and placed in Observation 5/10/21 at 1242 and converted     5/16:  diuresing well with IV Lasix BID     Decreased air entry noted bilaterally     Extremities shows bilateral edema which is improved  *tentative discharge back to [facility] today     Vital Signs:   05/16/21 0700  97 5 °F (36 4 °C)  72  21  108/77  91  96 %  --  --  --  --  BiPAP  --  Lying   05/16/21 0514  --  --  --  --  --  97 %  --  --  --  --  --  Face mask  --   05/15/21 2229  --  63  --  --  --  98 %  35  --  --  --  BiPAP  Face mask  --   05/15/21 2105  97 5 °F (36 4 °C)  86  20  106/57  74  98 %  --  28  --  2 L/min  Nasal cannula  --  Lying   05/15/21 2000  --  --  --  --  --  --  --  28  --  2 L/min  Nasal cannula  --  --   05/15/21 1515  97 2 °F (36 2 °C)Abnormal   92  19  118/68  80  98 %  --  28  --  2 L/min  Nasal cannula  --  Lying   05/15/21 0924  97 6 °F (36 4 °C)  95  18  130/53  76  --  --  --  --  --  --  --  Lying   05/15/21 0923  --  95  --  --  --  --  --  --  --  --  --  --  --   05/15/21 0900  --  --  --  --  --  95 %  --  --  --  --  --  --  --   05/15/21 0817  --  --  --  --  --  95 %  --  --  --  --  --  --  --   05/15/21 0407  --  62  --  --  --  96 %  35  --  --  --  BiPAP  Face mask  --   05/14/21 2250  --  57  --  --  --  97 %  35  --  --  --  BiPAP  Face mask  --   05/14/21 2128  97 4 °F (36 3 °C)Abnormal   98  20  92/54  63  98 %  --  28  --  2 L/min  Nasal cannula  --  Lying   05/14/21 2007  --  --  --  --  --  97 %  --  28  --  2 L/min  Nasal cannula  --  --   05/14/21 1417  97 1 °F (36 2 °C)Abnormal   64  18  105/58  --  96 %  --  --  --  --  --  --  Lying   05/14/21 0950  --  99  18  137/61  84  97 %  --  --  --  --  --  --  Lying   05/14/21 0803  --  --  --  --  --  96 %  --  28  --  2 L/min  Nasal cannula  --  --   05/14/21 2905 97 2 °F (36 2 °C)Abnormal   87  19  104/47Abnormal   --  99 %  --  --  --  --  --  --  Lying      05/14 0701   05/15 0700 05/15 0701   05/16 0700 05/16 0701   -   P O  240 1120    Total Intake(mL/kg) 240 (1) 1120 (4 7)    Urine (mL/kg/hr) 1625 (0 3) 1625 (0 3) 125 (0 1)   Stool 0     Total Output 1625 1625 125   Net -1385 -505 -125         Unmeasured Stool Occurrence 0 x       Pertinent Labs/Diagnostic Results:   Results from last 7 days   Lab Units 05/14/21  1300 05/10/21  0950   SARS-COV-2  Negative Negative     Results from last 7 days   Lab Units 05/16/21  0933 05/13/21  0453 05/12/21  0521 05/11/21  0653 05/10/21  0950   WBC Thousand/uL 5 82 6 12 5 35 5 39 8 10   HEMOGLOBIN g/dL 13 9 13 8 13 4 12 7 14 2   HEMATOCRIT % 42 2 43 1 42 0 39 9 44 3   PLATELETS Thousands/uL 185 169 147* 168 198   NEUTROS ABS Thousands/µL 4 07 3 99 3 54 3 66 5 93     Results from last 7 days   Lab Units 05/16/21  0933 05/13/21  0453 05/12/21  1057 05/12/21  0521 05/11/21  0653 05/10/21  0950   SODIUM mmol/L 135 137  --  141 141 141   POTASSIUM mmol/L 4 0 3 4*  --  3 4* 3 8 4 3   CHLORIDE mmol/L 93* 94*  --  92* 95* 94*   CO2 mmol/L 38* 36*  --  45* 43* 42*   ANION GAP mmol/L 4 7  --  4 3* 5   BUN mg/dL 18 16  --  15 15 18   CREATININE mg/dL 0 93 0 87  --  0 84 0 79 0 84   EGFR ml/min/1 73sq m 95 101  --  102 105 102   CALCIUM mg/dL 9 5 9 5  --  9 3 8 9 9 3   CALCIUM, IONIZED mmol/L  --   --  1 15  --   --   --    MAGNESIUM mg/dL  --  2 2  --  2 1 1 9 2 0     Results from last 7 days   Lab Units 05/10/21  0950   AST U/L 11*   ALT U/L 10   ALK PHOS U/L 75 5   TOTAL PROTEIN g/dL 7 6   ALBUMIN g/dL 4 1   TOTAL BILIRUBIN mg/dL 0 78     Results from last 7 days   Lab Units 05/16/21  0933 05/13/21  0453 05/12/21  0521 05/11/21  0653 05/10/21  0950   GLUCOSE RANDOM mg/dL 187* 102 102 104 99          Diet Cardiovascular; Cardiac; Fluid Restriction 1500 ML     Scheduled Medications:  artificial tear, , Both Eyes, HS  budesonide-formoterol, 2 puff, Inhalation, BID  buPROPion, 300 mg, Oral, Daily  calcium carbonate-vitamin D, 1 tablet, Oral, Daily With Breakfast  cholecalciferol, 5,000 Units, Oral, Daily  dabigatran etexilate, 150 mg, Oral, Q12H [address]  digoxin, 125 mcg, Oral, Daily  docusate sodium, 100 mg, Oral, BID  fluticasone, 1 spray, Nasal, BID  furosemide, 40 mg, Intravenous, BID (diuretic)  hydrOXYzine HCL, 25 mg, Oral, Q8H MARY  methocarbamol, 750 mg, Oral, Q8H MARY  metoprolol tartrate, 25 mg, Oral, Q12H MARY  nystatin, , Topical, BID  potassium chloride, 20 mEq, Oral, BID  senna, 1 tablet, Oral, HS  spironolactone, 50 mg, Oral, Daily  traZODone, 150 mg, Oral, HS     PRN Meds:  acetaminophen, 650 mg, Oral, Q6H PRN GIVEN X 1/ 24 HRS  albuterol, 2 5 mg, Nebulization, Q6H PRN  ammonium lactate, , Topical, BID PRN  metoprolol, 2 5 mg, Intravenous, Q6H PRN  Discharge Plan: To be determined   Inpatient Case Management following for all discharge needs    Network Utilization Review Department  ATTENTION: Please call with any questions or concerns to [phone] and carefully listen to the prompts so that you are directed to the right person  All voicemails are confidential   Farrel Bodily all requests for admission clinical reviews, approved or denied determinations and any other requests to dedicated fax number below belonging to the campus where the patient is receiving treatment   List of dedicated fax numbers for the Facilities:  [address] DENIALS (Administrative/Medical Necessity) [phone]   [address] [place] [address] (Maternity/NICU/Pediatrics) [phone]   [address] [phone]   [address] Charles Samson [address]118 [address] [address] 85 [facility] Waldemar Jackson 1481 [address] [facility] [phone]

## 2021-05-16 NOTE — ASSESSMENT & PLAN NOTE
· H/o DVT and PE, s/p IVC filter insertion in 2019   · Patient was supposed to have the filter removed, but could not, was referred to [facility] but he did not have that done as he switched insurance   · Is on anticoagulation with Pradaxa for Afib     · PLAN:   Continue anticoagulation with Pradaxa

## 2021-05-16 NOTE — DISCHARGE SUMMARY
Adela U  67   Discharge- Karol Dueñas 1970, 49 y o  male MRN: [id number]  Unit/Bed#: -[facility] Encounter: [id number]  Primary Care Provider: Clement Sarmiento MD   Date and time admitted to hospital: 5/10/2021  9:27 AM    * Acute on chronic diastolic congestive heart failure [facility])  Assessment & Plan  Wt Readings from Last 3 Encounters:   05/16/21 (!) 239 kg (526 lb 14 4 oz)   06/15/20 (!) 161 kg (355 lb 3 2 oz)   04/18/19 (!) 189 kg (417 lb)     · Followed up with cardiology as outpatient - last seen in Jan 2021   · As per chart review, echo reports were reviewed  Prior ECHO (2017, no recent ECHO available) results indicate poor endocardial definition, but overall normal systolic function   · As per chart review cardiology advised to continue lasix 40 mg PO BID, however patient has been only on 20 mg PO BID at the nursing facility as per nursing home medication records   · S/p 20 mg IV lasix dose at the time of admission - had a urinary output of 850 ml within 1 5 hours of dose administration  20 mg IV dose was chosen as the patient had soft BP ( in the 100s SBP )   · Transthoracic echo--estimated EF - 60 %, study was inadequate for RWMA assessment  Upon personal review by cardiologist, patient seemed to have a good RV function with no dilatation  CHF is likely impaired diastolic function of the LV  · Patient feels subjectively better - reports improvement in SOB  · I/O - 24 hr UO - 1 3 L, intake is not being documented  Total amount diuresed since admission is about 9 L  Weights are inaccurate  · Received 40 mg IV lasix bid and one dose of 500 mg IV diamox     · Pt will need to be discharged on at least 40 mg PO BID lasix dose   · Patient was discharged to yesterday, hold due to transportation  · Continue fluid restriction, strict I/O charting, daily weights  · Will check BMP  · Needs to be on a strict low-salt diet at the time of discharge   · Outpatient follow up within 1 week at the cardiology office             QT prolongation  Assessment & Plan  · Patient presented with a prolonged QTc of 537 ms  · On telemetry monitoring   · Serum potassium is 3 4, magnesium is 2 2    PLAN :   · Avoid QTc prolonging agents   · Continue telemetry monitoring       Hypothyroidism  Assessment & Plan  · Patient has a history of hypothyroidism, was on levothyroxine 25 mcg PO daily   · This medication is not his list of medications that he takes at the nursing home   · Last TSH in August 2020 - 1 75   · TSH is normal at 3 2 on 05/10    PLAN :   · At the nursing home, patient is not on levothyroxine-- will continue to hold off     COPD (chronic obstructive pulmonary disease) ([facility])  Assessment & Plan  · Known case of COPD, former smoker, quit about 6 years ago, used to smoke 1 ppd x 20 years   · Is on symbicort at the nursing home   · Reports increased cough with increase  In amount of whitish sputum production, no change in color / consistency   · No wheeze heard in physical exam, however diminished breath sound and difficult to appreciate due to body habitus     · Patient has no mental status abnormalities, is alert and oriented x 3     PLAN :  · Continue symbicort while inpatient   · Continue albuterol nebs PRN       Atrial fibrillation ([facility])  Assessment & Plan  · Patient is a known case of atrial fibrillation, on anticoagulation with pradaxa   · Is also on digoxin 0 125 mg PO daily, and metoprolol tartrate 25 mg PO q12h   · Patient presented with SOB, chest discomfort but no hypoxia   · HR on admission - 109, had intermittent tachycardic episodes in the 110-120s   · Digoxin level - 0 8   · S/p 12 5 mg PO Lopressor x 1 dose at the time of admission due to soft Bps ( SBP - 100s )   · Telemetry overnight -- HR - 90s    PLAN :   · Continue Telemetry monitoring   · Continue metoprolol tartrate at a dose of 25 mg PO q12h   · Continue digoxin at home dose   · Keep magnesium > 2, potassium > 4   · Continue pradaxa at home dose for anticoagulation       DVT (deep venous thrombosis) ([facility])  Assessment & Plan  · H/o DVT and PE, s/p IVC filter insertion in 2019   · Patient was supposed to have the filter removed, but could not, was referred to [facility] but he did not have that done as he switched insurance   · Is on anticoagulation with Pradaxa for Afib     · PLAN:   Continue anticoagulation with Pradaxa     Pulmonary artery aneurysm [facility])  Assessment & Plan  · Pulmonary artery aneurysm detected on CTA   · In comparison to imaging obtained in 2017, size increased from 4 9 cm --> 5 4 cm     PLAN :   · Continue outpatient management     Obesity hypoventilation syndrome ([facility] )  Assessment & Plan  · K/C/O obesity hypoventilation syndrome   · Follows a pulmonologist as outpatient - last seen in March 2021   · Previous medical history is also suggestive of severe FILIPPO ( based on home sleep study ) and severe restrictive pattern on PFT   · Patient is on continuous oxygen supplementation of 2 L   · Was advised during previous office visit to strictly use BiPAP at night for maximum benefit and weight loss --patient reports that he has not been fully compliant with his BiPAP use  · Labs : elevated bicarbonate- 45, is likely compensation for his chronic CO2 retention   · Metabolic alkalosis is worsening due to aggressive diuresis leading to volume contraction   · VBG - pCO2 - 63, patient is AAO x 3     PLAN:   · Continue BiPAP HS and PRN - respiratory therapy on board     Morbid obesity ([facility] )  Assessment & Plan  · S/p gastric sleeve surgery in 2018   · Patient used to weigh approximately 650 pounds, lost about 250-260 pounds and weight has been stable since  · As per nursing home staff, patient refuses to be weighed regularly but they do estimate a significant weight gain     · Patient reported to cardiology that he at least gained about 20 lbs   · Patient follows with bariatric surgery - last seen on 04/21     PLAN :   · Continue outpatient management and follow up with bariatric surgery     Shortness of breath  Assessment & Plan  · Patient presented with SOB, started few days ago worsened on the morning of admission   · Associated symptoms : chest heaviness, orthopnea, PND, decreased urination with the current dose of lasix, increase in cough and sputum production, palpitations, subjective feeling of "fluid in his body / chest"   · No fever, chills, URTI symptoms, calf swelling / pain above baseline, no wheezing, change in color / consistency / character of sputum   · Vitals : saturating at 99% on baseline oxygen of 2 L, intermittent episodes of tachycardia  Monitor showed Afib with RVR - HR - sometimes increasing to 120s   · Labs : normal troponin, mildly elevated BNP - 125 ( patient is morbidly obese), elevated bicarbonate, normal renal function, no leukocytosis   · Chest CTA : ruled out PE, reported diffuse groundglass opacities -- could represent pulmonary edema or infectious / inflammatory process  · Etiology of SOB likely CHF exacerbation, in the setting of tachyarrhythmia  · Patient received 20 mg IV lasix dose on the day of admission due to soft BP - had a urine output of 850 ml within 1 5 hours  · bp is on lower side ,   · Patient is supposed to be on a 40 mg PO BID dose at the nursing facility, however is only on 20 mg PO BID as per nursing home medication records   · Patient feels subjectively better - reports improvement in SOB  · I/O - 24 hr UO - 1 8 L, intake is not being documented  Total amount diuresed since admission is about 7 L  Weights are inaccurate     · On iv lasix and got 2 doses of diamox   · Cardiology on board     Will discharge in po lasix 40 mg with potassium supp    PLAN :   · See further management under "acute on chronic diastolic CHF"   · Continue oxygen supplementation via nasal cannula to maintain sats of 88-92%   · Continue home inhaler - Symbicort, albuterol nebs PRN               Resolved Problems  Date Reviewed: 5/15/2021    None          Admission Date:   Admission Orders (From admission, onward)     Ordered        05/11/21 1817  Inpatient Admission  Once         05/10/21 1242  Place in Observation  Once                     Admitting Diagnosis: Shortness of breath [R06 02]  SOB (shortness of breath) [R06 02]  Obesity [E66 9]  COPD exacerbation (HCC) [J44 1]  Chest pain, unspecified type [R07 9]      Procedures Performed:   Orders Placed This Encounter   Procedures    ED ECG Documentation Only       Summary of Hospital Course: pt was admitted with CHF  exacerbation with diastolic dysfunction and given iv lasix , pt diuresed well , and more than 10l negative and also weight change/loss noted and  Cardiology input obtained , pt clinically improved   2 d echo was obtained showed Systolic function was normal  Ejection fraction was estimated to be 60 %  This study was inadequate for the evaluation of regional wall motion  Wall thickness could not be accurately determined  pt had elevated bicarb level and given diamox   BP was on lower side 90s/ 40 s , lasix dose held and now BP improved   Plan was d/w cardiology and stable for discharge on po lasix   HEENT-PERRLA, moist oral mucosa  Neck-supple, no JVD elevation   Respiratory-equal air entry bilaterally, no rales or rhonchi  Cardiovascular system-S1, S2 heard, no murmur or gallops or rubs  Abdomen-soft, nontender, no guarding or rigidity, bowel sounds heard  Extremities-b/l pedal edema  Peripheral pulses palpable  Musculoskeletal-no contractures  Central nervous system-no acute focal neurological deficit ,no sensory or motor deficit noted  Skin-no rash noted    Significant Findings, Care, Treatment and Services Provided:    Complications: nil    Condition at Discharge: fair         Discharge instructions/Information to patient and family:   See after visit summary for information provided to patient and family        Provisions for Follow-Up Care:  See after visit summary for information related to follow-up care and any pertinent home health orders  PCP: Gustavo Barber MD    Disposition: Skilled nursing facility at [facility]     Planned Readmission: No    Discharge Statement   I spent 45 minutes discharging the patient  This time was spent on the day of discharge  I had direct contact with the patient on the day of discharge  Additional documentation is required if more than 30 minutes were spent on discharge  Discharge Medications:  See after visit summary for reconciled discharge medications provided to patient and family

## 2021-05-16 NOTE — ASSESSMENT & PLAN NOTE
· Patient presented with SOB, started few days ago worsened on the morning of admission   · Associated symptoms : chest heaviness, orthopnea, PND, decreased urination with the current dose of lasix, increase in cough and sputum production, palpitations, subjective feeling of "fluid in his body / chest"   · No fever, chills, URTI symptoms, calf swelling / pain above baseline, no wheezing, change in color / consistency / character of sputum   · Vitals : saturating at 99% on baseline oxygen of 2 L, intermittent episodes of tachycardia  Monitor showed Afib with RVR - HR - sometimes increasing to 120s   · Labs : normal troponin, mildly elevated BNP - 125 ( patient is morbidly obese), elevated bicarbonate, normal renal function, no leukocytosis   · Chest CTA : ruled out PE, reported diffuse groundglass opacities -- could represent pulmonary edema or infectious / inflammatory process  · Etiology of SOB likely CHF exacerbation, in the setting of tachyarrhythmia  · Patient received 20 mg IV lasix dose on the day of admission due to soft BP - had a urine output of 850 ml within 1 5 hours  · bp is on lower side ,   · Patient is supposed to be on a 40 mg PO BID dose at the nursing facility, however is only on 20 mg PO BID as per nursing home medication records   · Patient feels subjectively better - reports improvement in SOB  · I/O - 24 hr UO - 1 8 L, intake is not being documented  Total amount diuresed since admission is about 7 L  Weights are inaccurate     · On iv lasix and got 2 doses of diamox   · Cardiology on board     Will discharge in po lasix 40 mg with potassium supp    PLAN :   · See further management under "acute on chronic diastolic CHF"   · Continue oxygen supplementation via nasal cannula to maintain sats of 88-92%   · Continue home inhaler - Symbicort, albuterol nebs PRN

## 2021-05-18 NOTE — UTILIZATION REVIEW
Notification of Discharge   This is a Notification of Discharge from our facility [address]  Please be advised that this patient has been discharge from our facility  Below you will find the admission and discharge date and time including the patients disposition  UTILIZATION REVIEW CONTACT:  Bere Phan  Utilization   Network Utilization Review Department  Phone: [phone] x carefully listen to the prompts  All voicemails are confidential   Email: [email or web address]     PHYSICIAN ADVISORY SERVICES:  FOR DJCK-GX-SLPL REVIEW - MEDICAL NECESSITY DENIAL  Phone: [phone]  Fax: [phone]  Email: [email or web address]     PRESENTATION DATE: 5/10/2021  9:27 AM  OBERVATION ADMISSION DATE:   INPATIENT ADMISSION DATE: 5/11/21 1817   DISCHARGE DATE: 5/16/2021  2:07 PM  DISPOSITION: Non [facility] SN[facility]/S[facility] Non [facility] SN[facility]      IMPORTANT INFORMATION:  Send all requests for admission clinical reviews, approved or denied determinations and any other requests to dedicated fax number below belonging to the campus where the patient is receiving treatment   List of dedicated fax numbers:  [address] [place] [address] DENIALS (Administrative/Medical Necessity) [phone]   [address]  (Maternity/NICU/Pediatrics) [phone]   Sara Zuleta [phone]   Katy Dey [phone]   Mac Ott [phone]   [facility] [address] [place] [address] [phone]   [facility]  [phone]   [address] [facility] [address]  [address] [facility] [address] [facility] [phone]

## 2021-07-03 ENCOUNTER — HOSPITAL ENCOUNTER (EMERGENCY)
Facility: HOSPITAL | Age: 51
End: 2021-07-04
Attending: EMERGENCY MEDICINE | Admitting: EMERGENCY MEDICINE
Payer: COMMERCIAL

## 2021-07-03 ENCOUNTER — APPOINTMENT (EMERGENCY)
Dept: RADIOLOGY | Facility: HOSPITAL | Age: 51
End: 2021-07-03
Payer: COMMERCIAL

## 2021-07-03 DIAGNOSIS — I48.91 ATRIAL FIBRILLATION WITH RVR (HCC): ICD-10-CM

## 2021-07-03 DIAGNOSIS — I50.9 CHF EXACERBATION (HCC): ICD-10-CM

## 2021-07-03 DIAGNOSIS — R06.89 HYPERCARBIA: ICD-10-CM

## 2021-07-03 DIAGNOSIS — R06.02 SHORTNESS OF BREATH: Primary | ICD-10-CM

## 2021-07-03 LAB
ALBUMIN SERPL BCP-MCNC: 3.8 G/DL (ref 3.4–4.8)
ALP SERPL-CCNC: 60.1 U/L (ref 10–129)
ALT SERPL W P-5'-P-CCNC: 11 U/L (ref 5–63)
ANION GAP SERPL CALCULATED.3IONS-SCNC: 4 MMOL/L (ref 4–13)
APTT PPP: 27 SECONDS (ref 23–31)
ARTERIAL PATENCY WRIST A: YES
AST SERPL W P-5'-P-CCNC: 10 U/L (ref 15–41)
BASE EXCESS BLDA CALC-SCNC: 15.6 MMOL/L
BASOPHILS # BLD AUTO: 0.03 THOUSANDS/ΜL (ref 0–0.1)
BASOPHILS NFR BLD AUTO: 0 % (ref 0–1)
BILIRUB SERPL-MCNC: 0.71 MG/DL (ref 0.3–1.2)
BILIRUB UR QL STRIP: NEGATIVE
BNP SERPL-MCNC: 122.2 PG/ML (ref 1–100)
BODY TEMPERATURE: 98.7 DEGREES FEHRENHEIT
BUN SERPL-MCNC: 15 MG/DL (ref 6–20)
CALCIUM SERPL-MCNC: 9.3 MG/DL (ref 8.4–10.2)
CHLORIDE SERPL-SCNC: 92 MMOL/L (ref 96–108)
CLARITY UR: ABNORMAL
CO2 SERPL-SCNC: 45 MMOL/L (ref 22–33)
COLOR UR: YELLOW
CREAT SERPL-MCNC: 0.91 MG/DL (ref 0.5–1.2)
DIGOXIN SERPL-MCNC: <0.5 NG/ML (ref 0.8–2)
EOSINOPHIL # BLD AUTO: 0.09 THOUSAND/ΜL (ref 0–0.61)
EOSINOPHIL NFR BLD AUTO: 1 % (ref 0–6)
ERYTHROCYTE [DISTWIDTH] IN BLOOD BY AUTOMATED COUNT: 13.3 % (ref 11.6–15.1)
FLUAV RNA NPH QL NAA+PROBE: NORMAL
FLUBV RNA NPH QL NAA+PROBE: NORMAL
GFR SERPL CREATININE-BSD FRML MDRD: 98 ML/MIN/1.73SQ M
GLUCOSE SERPL-MCNC: 120 MG/DL (ref 65–140)
GLUCOSE UR STRIP-MCNC: NEGATIVE MG/DL
HCO3 BLDA-SCNC: 45 MMOL/L (ref 22–28)
HCT VFR BLD AUTO: 39.8 % (ref 36.5–49.3)
HGB BLD-MCNC: 12.7 G/DL (ref 12–17)
HGB UR QL STRIP.AUTO: NEGATIVE
IMM GRANULOCYTES # BLD AUTO: 0.02 THOUSAND/UL (ref 0–0.2)
IMM GRANULOCYTES NFR BLD AUTO: 0 % (ref 0–2)
INR PPP: 0.96 (ref 0.9–1.1)
KETONES UR STRIP-MCNC: NEGATIVE MG/DL
LACTATE SERPL-SCNC: 1.5 MMOL/L (ref 0–2)
LEUKOCYTE ESTERASE UR QL STRIP: NEGATIVE
LYMPHOCYTES # BLD AUTO: 1.25 THOUSANDS/ΜL (ref 0.6–4.47)
LYMPHOCYTES NFR BLD AUTO: 17 % (ref 14–44)
MAGNESIUM SERPL-MCNC: 1.9 MG/DL (ref 1.6–2.6)
MCH RBC QN AUTO: 30.8 PG (ref 26.8–34.3)
MCHC RBC AUTO-ENTMCNC: 31.9 G/DL (ref 31.4–37.4)
MCV RBC AUTO: 97 FL (ref 82–98)
MONOCYTES # BLD AUTO: 0.53 THOUSAND/ΜL (ref 0.17–1.22)
MONOCYTES NFR BLD AUTO: 7 % (ref 4–12)
NASAL CANNULA: 4
NEUTROPHILS # BLD AUTO: 5.53 THOUSANDS/ΜL (ref 1.85–7.62)
NEUTS SEG NFR BLD AUTO: 75 % (ref 43–75)
NITRITE UR QL STRIP: NEGATIVE
O2 CT BLDA-SCNC: 17.6 ML/DL (ref 16–23)
OXYHGB MFR BLDA: 95.1 % (ref 94–97)
PCO2 BLDA: 82.5 MM HG (ref 36–44)
PH BLDA: 7.36 [PH] (ref 7.35–7.45)
PH UR STRIP.AUTO: 5.5 [PH]
PLATELET # BLD AUTO: 161 THOUSANDS/UL (ref 149–390)
PMV BLD AUTO: 10.1 FL (ref 8.9–12.7)
PO2 BLDA: 96.8 MM HG (ref 75–129)
POTASSIUM SERPL-SCNC: 3.7 MMOL/L (ref 3.5–5)
PROT SERPL-MCNC: 7 G/DL (ref 6.4–8.3)
PROT UR STRIP-MCNC: NEGATIVE MG/DL
PROTHROMBIN TIME: 10.9 SECONDS (ref 9.5–12.1)
RBC # BLD AUTO: 4.12 MILLION/UL (ref 3.88–5.62)
RSV RNA NPH QL NAA+PROBE: NORMAL
SARS-COV-2 RNA RESP QL NAA+PROBE: NEGATIVE
SODIUM SERPL-SCNC: 141 MMOL/L (ref 133–145)
SP GR UR STRIP.AUTO: 1.02 (ref 1–1.03)
SPECIMEN SOURCE: ABNORMAL
TROPONIN I SERPL-MCNC: <0.03 NG/ML (ref 0–0.07)
UROBILINOGEN UR QL STRIP.AUTO: 0.2 E.U./DL
WBC # BLD AUTO: 7.45 THOUSAND/UL (ref 4.31–10.16)

## 2021-07-03 PROCEDURE — 93005 ELECTROCARDIOGRAM TRACING: CPT

## 2021-07-03 PROCEDURE — 87635 SARS-COV-2 COVID-19 AMP PRB: CPT | Performed by: EMERGENCY MEDICINE

## 2021-07-03 PROCEDURE — 81003 URINALYSIS AUTO W/O SCOPE: CPT | Performed by: EMERGENCY MEDICINE

## 2021-07-03 PROCEDURE — 96374 THER/PROPH/DIAG INJ IV PUSH: CPT

## 2021-07-03 PROCEDURE — 83880 ASSAY OF NATRIURETIC PEPTIDE: CPT | Performed by: EMERGENCY MEDICINE

## 2021-07-03 PROCEDURE — 87077 CULTURE AEROBIC IDENTIFY: CPT | Performed by: EMERGENCY MEDICINE

## 2021-07-03 PROCEDURE — 85025 COMPLETE CBC W/AUTO DIFF WBC: CPT | Performed by: EMERGENCY MEDICINE

## 2021-07-03 PROCEDURE — 80053 COMPREHEN METABOLIC PANEL: CPT | Performed by: EMERGENCY MEDICINE

## 2021-07-03 PROCEDURE — 83605 ASSAY OF LACTIC ACID: CPT | Performed by: EMERGENCY MEDICINE

## 2021-07-03 PROCEDURE — 99285 EMERGENCY DEPT VISIT HI MDM: CPT

## 2021-07-03 PROCEDURE — 71045 X-RAY EXAM CHEST 1 VIEW: CPT

## 2021-07-03 PROCEDURE — 84484 ASSAY OF TROPONIN QUANT: CPT | Performed by: EMERGENCY MEDICINE

## 2021-07-03 PROCEDURE — 94760 N-INVAS EAR/PLS OXIMETRY 1: CPT

## 2021-07-03 PROCEDURE — 80162 ASSAY OF DIGOXIN TOTAL: CPT | Performed by: EMERGENCY MEDICINE

## 2021-07-03 PROCEDURE — 36600 WITHDRAWAL OF ARTERIAL BLOOD: CPT

## 2021-07-03 PROCEDURE — 94002 VENT MGMT INPAT INIT DAY: CPT

## 2021-07-03 PROCEDURE — 36415 COLL VENOUS BLD VENIPUNCTURE: CPT | Performed by: EMERGENCY MEDICINE

## 2021-07-03 PROCEDURE — 87631 RESP VIRUS 3-5 TARGETS: CPT | Performed by: EMERGENCY MEDICINE

## 2021-07-03 PROCEDURE — 83735 ASSAY OF MAGNESIUM: CPT | Performed by: EMERGENCY MEDICINE

## 2021-07-03 PROCEDURE — 99291 CRITICAL CARE FIRST HOUR: CPT | Performed by: EMERGENCY MEDICINE

## 2021-07-03 PROCEDURE — 96375 TX/PRO/DX INJ NEW DRUG ADDON: CPT

## 2021-07-03 PROCEDURE — 85730 THROMBOPLASTIN TIME PARTIAL: CPT | Performed by: EMERGENCY MEDICINE

## 2021-07-03 PROCEDURE — 85610 PROTHROMBIN TIME: CPT | Performed by: EMERGENCY MEDICINE

## 2021-07-03 PROCEDURE — 84145 PROCALCITONIN (PCT): CPT | Performed by: EMERGENCY MEDICINE

## 2021-07-03 PROCEDURE — 87040 BLOOD CULTURE FOR BACTERIA: CPT | Performed by: EMERGENCY MEDICINE

## 2021-07-03 PROCEDURE — 82805 BLOOD GASES W/O2 SATURATION: CPT | Performed by: EMERGENCY MEDICINE

## 2021-07-03 RX ORDER — FUROSEMIDE 10 MG/ML
40 INJECTION INTRAMUSCULAR; INTRAVENOUS ONCE
Status: COMPLETED | OUTPATIENT
Start: 2021-07-03 | End: 2021-07-03

## 2021-07-03 RX ORDER — DILTIAZEM HYDROCHLORIDE 5 MG/ML
50 INJECTION INTRAVENOUS ONCE
Status: COMPLETED | OUTPATIENT
Start: 2021-07-03 | End: 2021-07-03

## 2021-07-03 RX ADMIN — DILTIAZEM HYDROCHLORIDE 50 MG: 5 INJECTION, SOLUTION INTRAVENOUS at 21:24

## 2021-07-03 RX ADMIN — FUROSEMIDE 40 MG: 10 INJECTION, SOLUTION INTRAMUSCULAR; INTRAVENOUS at 21:25

## 2021-07-03 RX ADMIN — DILTIAZEM HYDROCHLORIDE 60 MG: 30 TABLET, FILM COATED ORAL at 21:34

## 2021-07-04 ENCOUNTER — APPOINTMENT (INPATIENT)
Dept: NON INVASIVE DIAGNOSTICS | Facility: HOSPITAL | Age: 51
DRG: 133 | End: 2021-07-04
Payer: COMMERCIAL

## 2021-07-04 ENCOUNTER — HOSPITAL ENCOUNTER (INPATIENT)
Facility: HOSPITAL | Age: 51
LOS: 4 days | Discharge: NURSING, REHAB OR LONG-TERM CARE | DRG: 133 | End: 2021-07-08
Attending: INTERNAL MEDICINE | Admitting: INTERNAL MEDICINE
Payer: COMMERCIAL

## 2021-07-04 VITALS
SYSTOLIC BLOOD PRESSURE: 127 MMHG | HEIGHT: 71 IN | RESPIRATION RATE: 28 BRPM | TEMPERATURE: 98.7 F | BODY MASS INDEX: 44.1 KG/M2 | DIASTOLIC BLOOD PRESSURE: 68 MMHG | OXYGEN SATURATION: 98 % | WEIGHT: 315 LBS | HEART RATE: 90 BPM

## 2021-07-04 DIAGNOSIS — F32.A DEPRESSION: Primary | ICD-10-CM

## 2021-07-04 PROBLEM — I82.409 DVT (DEEP VENOUS THROMBOSIS) (HCC): Status: RESOLVED | Noted: 2017-02-13 | Resolved: 2021-07-04

## 2021-07-04 LAB
ANION GAP SERPL CALCULATED.3IONS-SCNC: 0 MMOL/L (ref 4–13)
BASE EX.OXY STD BLDV CALC-SCNC: 72.1 % (ref 60–80)
BASE EX.OXY STD BLDV CALC-SCNC: 84.4 % (ref 60–80)
BASE EXCESS BLDV CALC-SCNC: 21.1 MMOL/L
BASE EXCESS BLDV CALC-SCNC: 22.3 MMOL/L
BASOPHILS # BLD AUTO: 0.02 THOUSANDS/ΜL (ref 0–0.1)
BASOPHILS NFR BLD AUTO: 0 % (ref 0–1)
BUN SERPL-MCNC: 11 MG/DL (ref 5–25)
BUN SERPL-MCNC: 13 MG/DL (ref 5–25)
CALCIUM SERPL-MCNC: 9 MG/DL (ref 8.3–10.1)
CALCIUM SERPL-MCNC: 9 MG/DL (ref 8.3–10.1)
CHLORIDE SERPL-SCNC: 100 MMOL/L (ref 100–108)
CHLORIDE SERPL-SCNC: 99 MMOL/L (ref 100–108)
CO2 SERPL-SCNC: 45 MMOL/L (ref 21–32)
CO2 SERPL-SCNC: >45 MMOL/L (ref 21–32)
CREAT SERPL-MCNC: 0.88 MG/DL (ref 0.6–1.3)
CREAT SERPL-MCNC: 0.95 MG/DL (ref 0.6–1.3)
EOSINOPHIL # BLD AUTO: 0.11 THOUSAND/ΜL (ref 0–0.61)
EOSINOPHIL NFR BLD AUTO: 2 % (ref 0–6)
ERYTHROCYTE [DISTWIDTH] IN BLOOD BY AUTOMATED COUNT: 13.6 % (ref 11.6–15.1)
GFR SERPL CREATININE-BSD FRML MDRD: 100 ML/MIN/1.73SQ M
GFR SERPL CREATININE-BSD FRML MDRD: 93 ML/MIN/1.73SQ M
GLUCOSE SERPL-MCNC: 107 MG/DL (ref 65–140)
GLUCOSE SERPL-MCNC: 129 MG/DL (ref 65–140)
GLUCOSE SERPL-MCNC: 131 MG/DL (ref 65–140)
GLUCOSE SERPL-MCNC: 146 MG/DL (ref 65–140)
GLUCOSE SERPL-MCNC: 157 MG/DL (ref 65–140)
HCO3 BLDV-SCNC: 51.8 MMOL/L (ref 24–30)
HCO3 BLDV-SCNC: 53.8 MMOL/L (ref 24–30)
HCT VFR BLD AUTO: 40.2 % (ref 36.5–49.3)
HGB BLD-MCNC: 12.3 G/DL (ref 12–17)
IMM GRANULOCYTES # BLD AUTO: 0.02 THOUSAND/UL (ref 0–0.2)
IMM GRANULOCYTES NFR BLD AUTO: 0 % (ref 0–2)
IPAP: 24
IPAP: 24
L PNEUMO1 AG UR QL IA.RAPID: NEGATIVE
LYMPHOCYTES # BLD AUTO: 0.91 THOUSANDS/ΜL (ref 0.6–4.47)
LYMPHOCYTES NFR BLD AUTO: 15 % (ref 14–44)
MAGNESIUM SERPL-MCNC: 1.7 MG/DL (ref 1.6–2.6)
MAGNESIUM SERPL-MCNC: 1.9 MG/DL (ref 1.6–2.6)
MCH RBC QN AUTO: 30.3 PG (ref 26.8–34.3)
MCHC RBC AUTO-ENTMCNC: 30.6 G/DL (ref 31.4–37.4)
MCV RBC AUTO: 99 FL (ref 82–98)
MONOCYTES # BLD AUTO: 0.41 THOUSAND/ΜL (ref 0.17–1.22)
MONOCYTES NFR BLD AUTO: 7 % (ref 4–12)
NEUTROPHILS # BLD AUTO: 4.48 THOUSANDS/ΜL (ref 1.85–7.62)
NEUTS SEG NFR BLD AUTO: 76 % (ref 43–75)
NON VENT- BIPAP: ABNORMAL
NON VENT- BIPAP: ABNORMAL
NRBC BLD AUTO-RTO: 0 /100 WBCS
NT-PROBNP SERPL-MCNC: 1440 PG/ML
O2 CT BLDV-SCNC: 13.6 ML/DL
O2 CT BLDV-SCNC: 16 ML/DL
PCO2 BLDV: 101.6 MM HG (ref 42–50)
PCO2 BLDV: 92.9 MM HG (ref 42–50)
PEEP MAX SETTING VENT: 12 CM[H2O]
PEEP MAX SETTING VENT: 12 CM[H2O]
PH BLDV: 7.34 [PH] (ref 7.3–7.4)
PH BLDV: 7.36 [PH] (ref 7.3–7.4)
PHOSPHATE SERPL-MCNC: 3.4 MG/DL (ref 2.7–4.5)
PLATELET # BLD AUTO: 135 THOUSANDS/UL (ref 149–390)
PMV BLD AUTO: 10 FL (ref 8.9–12.7)
PO2 BLDV: 39.4 MM HG (ref 35–45)
PO2 BLDV: 53.3 MM HG (ref 35–45)
POTASSIUM SERPL-SCNC: 3.4 MMOL/L (ref 3.5–5.3)
POTASSIUM SERPL-SCNC: 3.6 MMOL/L (ref 3.5–5.3)
PROCALCITONIN SERPL-MCNC: <0.05 NG/ML
PROCALCITONIN SERPL-MCNC: <0.05 NG/ML
RBC # BLD AUTO: 4.06 MILLION/UL (ref 3.88–5.62)
S PNEUM AG UR QL: NEGATIVE
SODIUM SERPL-SCNC: 145 MMOL/L (ref 136–145)
SODIUM SERPL-SCNC: 145 MMOL/L (ref 136–145)
TROPONIN I SERPL-MCNC: <0.02 NG/ML
VENT BIPAP FIO2: 40 %
VENT BIPAP FIO2: 40 %
WBC # BLD AUTO: 5.95 THOUSAND/UL (ref 4.31–10.16)

## 2021-07-04 PROCEDURE — 87449 NOS EACH ORGANISM AG IA: CPT | Performed by: NURSE PRACTITIONER

## 2021-07-04 PROCEDURE — 83735 ASSAY OF MAGNESIUM: CPT | Performed by: PHYSICIAN ASSISTANT

## 2021-07-04 PROCEDURE — B24BZZZ ULTRASONOGRAPHY OF HEART WITH AORTA: ICD-10-PCS | Performed by: INTERNAL MEDICINE

## 2021-07-04 PROCEDURE — 96376 TX/PRO/DX INJ SAME DRUG ADON: CPT

## 2021-07-04 PROCEDURE — 82948 REAGENT STRIP/BLOOD GLUCOSE: CPT

## 2021-07-04 PROCEDURE — 93308 TTE F-UP OR LMTD: CPT

## 2021-07-04 PROCEDURE — 82805 BLOOD GASES W/O2 SATURATION: CPT | Performed by: PHYSICIAN ASSISTANT

## 2021-07-04 PROCEDURE — 85025 COMPLETE CBC W/AUTO DIFF WBC: CPT | Performed by: NURSE PRACTITIONER

## 2021-07-04 PROCEDURE — 84145 PROCALCITONIN (PCT): CPT | Performed by: NURSE PRACTITIONER

## 2021-07-04 PROCEDURE — 87081 CULTURE SCREEN ONLY: CPT | Performed by: NURSE PRACTITIONER

## 2021-07-04 PROCEDURE — 80048 BASIC METABOLIC PNL TOTAL CA: CPT | Performed by: PHYSICIAN ASSISTANT

## 2021-07-04 PROCEDURE — 99291 CRITICAL CARE FIRST HOUR: CPT | Performed by: INTERNAL MEDICINE

## 2021-07-04 PROCEDURE — 93308 TTE F-UP OR LMTD: CPT | Performed by: INTERNAL MEDICINE

## 2021-07-04 PROCEDURE — 84100 ASSAY OF PHOSPHORUS: CPT | Performed by: NURSE PRACTITIONER

## 2021-07-04 PROCEDURE — 80048 BASIC METABOLIC PNL TOTAL CA: CPT | Performed by: NURSE PRACTITIONER

## 2021-07-04 PROCEDURE — 94760 N-INVAS EAR/PLS OXIMETRY 1: CPT

## 2021-07-04 PROCEDURE — 93325 DOPPLER ECHO COLOR FLOW MAPG: CPT | Performed by: INTERNAL MEDICINE

## 2021-07-04 PROCEDURE — 83880 ASSAY OF NATRIURETIC PEPTIDE: CPT | Performed by: NURSE PRACTITIONER

## 2021-07-04 PROCEDURE — 93321 DOPPLER ECHO F-UP/LMTD STD: CPT | Performed by: INTERNAL MEDICINE

## 2021-07-04 PROCEDURE — 87040 BLOOD CULTURE FOR BACTERIA: CPT | Performed by: NURSE PRACTITIONER

## 2021-07-04 PROCEDURE — 94002 VENT MGMT INPAT INIT DAY: CPT

## 2021-07-04 PROCEDURE — 84484 ASSAY OF TROPONIN QUANT: CPT | Performed by: NURSE PRACTITIONER

## 2021-07-04 PROCEDURE — 83735 ASSAY OF MAGNESIUM: CPT | Performed by: NURSE PRACTITIONER

## 2021-07-04 RX ORDER — MELATONIN
2000 DAILY
Status: DISCONTINUED | OUTPATIENT
Start: 2021-07-04 | End: 2021-07-08 | Stop reason: HOSPADM

## 2021-07-04 RX ORDER — ACETAMINOPHEN 325 MG/1
650 TABLET ORAL EVERY 6 HOURS PRN
Status: DISCONTINUED | OUTPATIENT
Start: 2021-07-04 | End: 2021-07-08 | Stop reason: HOSPADM

## 2021-07-04 RX ORDER — METOPROLOL TARTRATE 5 MG/5ML
5 INJECTION INTRAVENOUS ONCE
Status: COMPLETED | OUTPATIENT
Start: 2021-07-04 | End: 2021-07-04

## 2021-07-04 RX ORDER — CALCIUM CARBONATE 200(500)MG
500 TABLET,CHEWABLE ORAL DAILY PRN
Status: DISCONTINUED | OUTPATIENT
Start: 2021-07-04 | End: 2021-07-08 | Stop reason: HOSPADM

## 2021-07-04 RX ORDER — DILTIAZEM HYDROCHLORIDE 5 MG/ML
25 INJECTION INTRAVENOUS ONCE
Status: COMPLETED | OUTPATIENT
Start: 2021-07-04 | End: 2021-07-04

## 2021-07-04 RX ORDER — BUDESONIDE AND FORMOTEROL FUMARATE DIHYDRATE 160; 4.5 UG/1; UG/1
2 AEROSOL RESPIRATORY (INHALATION) 2 TIMES DAILY
Status: DISCONTINUED | OUTPATIENT
Start: 2021-07-04 | End: 2021-07-08 | Stop reason: HOSPADM

## 2021-07-04 RX ORDER — POTASSIUM CHLORIDE 20 MEQ/1
40 TABLET, EXTENDED RELEASE ORAL ONCE
Status: COMPLETED | OUTPATIENT
Start: 2021-07-04 | End: 2021-07-04

## 2021-07-04 RX ORDER — PANTOPRAZOLE SODIUM 20 MG/1
20 TABLET, DELAYED RELEASE ORAL
Status: DISCONTINUED | OUTPATIENT
Start: 2021-07-04 | End: 2021-07-08 | Stop reason: HOSPADM

## 2021-07-04 RX ORDER — DILTIAZEM HYDROCHLORIDE 5 MG/ML
30 INJECTION INTRAVENOUS ONCE
Status: DISCONTINUED | OUTPATIENT
Start: 2021-07-04 | End: 2021-07-04

## 2021-07-04 RX ORDER — LEVOTHYROXINE SODIUM 0.03 MG/1
25 TABLET ORAL
Status: DISCONTINUED | OUTPATIENT
Start: 2021-07-04 | End: 2021-07-08 | Stop reason: HOSPADM

## 2021-07-04 RX ORDER — LEVALBUTEROL 1.25 MG/.5ML
1.25 SOLUTION, CONCENTRATE RESPIRATORY (INHALATION) EVERY 6 HOURS PRN
Status: DISCONTINUED | OUTPATIENT
Start: 2021-07-04 | End: 2021-07-08 | Stop reason: HOSPADM

## 2021-07-04 RX ORDER — DABIGATRAN ETEXILATE 150 MG/1
150 CAPSULE, COATED PELLETS ORAL 2 TIMES DAILY
Status: DISCONTINUED | OUTPATIENT
Start: 2021-07-04 | End: 2021-07-08 | Stop reason: HOSPADM

## 2021-07-04 RX ORDER — FUROSEMIDE 10 MG/ML
80 INJECTION INTRAMUSCULAR; INTRAVENOUS ONCE
Status: COMPLETED | OUTPATIENT
Start: 2021-07-04 | End: 2021-07-04

## 2021-07-04 RX ORDER — BUPROPION HYDROCHLORIDE 100 MG/1
100 TABLET ORAL DAILY
Status: DISCONTINUED | OUTPATIENT
Start: 2021-07-04 | End: 2021-07-08 | Stop reason: HOSPADM

## 2021-07-04 RX ORDER — FUROSEMIDE 10 MG/ML
40 INJECTION INTRAMUSCULAR; INTRAVENOUS ONCE
Status: COMPLETED | OUTPATIENT
Start: 2021-07-04 | End: 2021-07-04

## 2021-07-04 RX ADMIN — CYANOCOBALAMIN TAB 500 MCG 1000 MCG: 500 TAB at 09:33

## 2021-07-04 RX ADMIN — ACETAMINOPHEN 650 MG: 325 TABLET, FILM COATED ORAL at 21:55

## 2021-07-04 RX ADMIN — DABIGATRAN ETEXILATE MESYLATE 150 MG: 150 CAPSULE ORAL at 09:33

## 2021-07-04 RX ADMIN — Medication 2000 UNITS: at 09:33

## 2021-07-04 RX ADMIN — LEVOTHYROXINE SODIUM 25 MCG: 25 TABLET ORAL at 07:18

## 2021-07-04 RX ADMIN — METOPROLOL TARTRATE 5 MG: 5 INJECTION INTRAVENOUS at 07:17

## 2021-07-04 RX ADMIN — POTASSIUM CHLORIDE 40 MEQ: 1500 TABLET, EXTENDED RELEASE ORAL at 16:10

## 2021-07-04 RX ADMIN — DILTIAZEM HYDROCHLORIDE 25 MG: 5 INJECTION INTRAVENOUS at 01:45

## 2021-07-04 RX ADMIN — CALCIUM CARBONATE (ANTACID) CHEW TAB 500 MG 500 MG: 500 CHEW TAB at 15:17

## 2021-07-04 RX ADMIN — FUROSEMIDE 40 MG: 10 INJECTION, SOLUTION INTRAMUSCULAR; INTRAVENOUS at 07:17

## 2021-07-04 RX ADMIN — FUROSEMIDE 80 MG: 10 INJECTION, SOLUTION INTRAMUSCULAR; INTRAVENOUS at 09:34

## 2021-07-04 RX ADMIN — METOPROLOL TARTRATE 25 MG: 25 TABLET, FILM COATED ORAL at 21:54

## 2021-07-04 RX ADMIN — PERFLUTREN 0.6 ML/MIN: 6.52 INJECTION, SUSPENSION INTRAVENOUS at 14:00

## 2021-07-04 RX ADMIN — DABIGATRAN ETEXILATE MESYLATE 150 MG: 150 CAPSULE ORAL at 18:05

## 2021-07-04 RX ADMIN — PANTOPRAZOLE SODIUM 20 MG: 20 TABLET, DELAYED RELEASE ORAL at 07:18

## 2021-07-04 RX ADMIN — AZITHROMYCIN MONOHYDRATE 500 MG: 500 INJECTION, POWDER, LYOPHILIZED, FOR SOLUTION INTRAVENOUS at 06:40

## 2021-07-04 RX ADMIN — METOPROLOL TARTRATE 25 MG: 25 TABLET, FILM COATED ORAL at 09:33

## 2021-07-04 RX ADMIN — BUPROPION HYDROCHLORIDE 100 MG: 100 TABLET, FILM COATED ORAL at 09:33

## 2021-07-04 RX ADMIN — BUDESONIDE AND FORMOTEROL FUMARATE DIHYDRATE 2 PUFF: 160; 4.5 AEROSOL RESPIRATORY (INHALATION) at 18:05

## 2021-07-04 RX ADMIN — BUDESONIDE AND FORMOTEROL FUMARATE DIHYDRATE 2 PUFF: 160; 4.5 AEROSOL RESPIRATORY (INHALATION) at 09:33

## 2021-07-04 RX ADMIN — INSULIN LISPRO 1 UNITS: 100 INJECTION, SOLUTION INTRAVENOUS; SUBCUTANEOUS at 18:05

## 2021-07-04 NOTE — ED NOTES
Transfer Information:   with [facility] @ [address] 236  Dr Yuriy Fairchild accepting  Call report: Leon Matta, RN  07/03/21 [facility], [facility]  07/04/21 190 [facility], RN  07/04/21 6335

## 2021-07-04 NOTE — ED NOTES
Pt BIBA with SOB and chest pain, pt in active Afib, meds given as ordered, pt NAD, will continue to monitor        Cash Vergara RN  07/03/21 2171

## 2021-07-04 NOTE — ASSESSMENT & PLAN NOTE
· Patient with complaints of shortness of breath, yellow sputum production  Denies fevers    Denies increased albuterol nebulizer  · Start azithromycin  mg Q 24 hours x3 days  · Diminished breath sounds noted bilaterally, no evidence of wheezing  · Continue Symbicort  · Start nebulizers as needed with Xopenex/Atrovent for shortness of breath/wheeze  · Given no wheezing noted will hold off on systemic steroids for now

## 2021-07-04 NOTE — ASSESSMENT & PLAN NOTE
Wt Readings from Last 3 Encounters:   07/04/21 (!) 225 kg (496 lb 7 6 oz)   07/03/21 (!) 239 kg (526 lb)   05/16/21 (!) 239 kg (526 lb 14 4 oz)     · Secondary to volume overload  · Chest x-ray pulmonary vascular congestion, volume overload  · Patient states that he feels he is not urinating nearly as much as he was previously with his Lasix b i d  Regimen  · Was given Lasix 40 mg IV x1 at [facility] in the ER with approximately 300 mL urine output  · Give repeat dose of Lasix 40 mg IV x1 now  · Holding home p o   Lasix for now, will diurese as needed  · Midsternal chest pain, reproducible upon palpation  · No evidence of ischemia on EKG  · Continue telemetry  · Check troponin, proBNP  · Had recent echo 05/2021 EF 60%, noted as technically difficult study secondary to patient's body habitus  · Daily weights

## 2021-07-04 NOTE — EMTALA/ACUTE CARE TRANSFER
[address] [facility]  EMERGENCY DEPARTMENT  [address] [facility] [address]  Dept: [phone]      EMTALA TRANSFER CONSENT    NAME Je Squires                                         1970                              MRN [id number]    I have been informed of my rights regarding examination, treatment, and transfer   by Dr Liane Lira MD    Benefits: Specialized equipment and/or services available at the receiving facility (Include comment)________________________ (critical care)    Risks: Potential deterioration of medical condition, Increased discomfort during transfer      Consent for Transfer:  I acknowledge that my medical condition has been evaluated and explained to me by the emergency department physician or other qualified medical person and/or my attending physician, who has recommended that I be transferred to the service of  Accepting Physician: Dr Eduarda Peters at [facility] Name, [address] : [place]  The above potential benefits of such transfer, the potential risks associated with such transfer, and the probable risks of not being transferred have been explained to me, and I fully understand them  The doctor has explained that, in my case, the benefits of transfer outweigh the risks  I agree to be transferred  I authorize the performance of emergency medical procedures and treatments upon me in both transit and upon arrival at the receiving facility  Additionally, I authorize the release of any and all medical records to the receiving facility and request they be transported with me, if possible  I understand that the safest mode of transportation during a medical emergency is an ambulance and that the Hospital advocates the use of this mode of transport   Risks of traveling to the receiving facility by car, including absence of medical control, life sustaining equipment, such as oxygen, and medical personnel has been explained to me and I fully understand them     ([facility])  [  ]  I consent to the stated transfer and to be transported by ambulance/helicopter  [  ]  I consent to the stated transfer, but refuse transportation by ambulance and accept full responsibility for my transportation by car  I understand the risks of non-ambulance transfers and I exonerate the Hospital and its staff from any deterioration in my condition that results from this refusal     X___________________________________________    DATE  21  TIME________  Signature of patient or legally responsible individual signing on patient behalf           RELATIONSHIP TO PATIENT_________________________          Provider Certification    NAME Jared Tobias                                         1970                              MRN [id number]    A medical screening exam was performed on the above named patient  Based on the examination:    Condition Necessitating Transfer The primary encounter diagnosis was Shortness of breath  Diagnoses of Atrial fibrillation with RVR ([facility] ), CHF exacerbation ([facility] ), and Hypercarbia were also pertinent to this visit      Patient Condition: The patient has been stabilized such that within reasonable medical probability, no material deterioration of the patient condition or the condition of the unborn child(margarita) is likely to result from the transfer    Reason for Transfer: Level of Care needed not available at this facility    Transfer Requirements: [address]   · Space available and qualified personnel available for treatment as acknowledged by [facility]  · Agreed to accept transfer and to provide appropriate medical treatment as acknowledged by       Dr Niurka Rodriguez  · Appropriate medical records of the examination and treatment of the patient are provided at the time of transfer   [address] _______  · Transfer will be performed by qualified personnel from    and appropriate transfer equipment as required, including the use of necessary and appropriate life support measures  Provider Certification: I have examined the patient and explained the following risks and benefits of being transferred/refusing transfer to the patient/family:  General risk, such as traffic hazards, adverse weather conditions, rough terrain or turbulence, possible failure of equipment (including vehicle or aircraft), or consequences of actions of persons outside the control of the transport personnel, Unanticipated needs of medical equipment and personnel during transport, Risk of worsening condition      Based on these reasonable risks and benefits to the patient and/or the unborn child(margarita), and based upon the information available at the time of the patients examination, I certify that the medical benefits reasonably to be expected from the provision of appropriate medical treatments at another medical facility outweigh the increasing risks, if any, to the individuals medical condition, and in the case of labor to the unborn child, from effecting the transfer      X____________________________________________ DATE 07/04/21        TIME_______      ORIGINAL - SEND TO MEDICAL RECORDS   COPY - SEND WITH PATIENT DURING TRANSFER

## 2021-07-04 NOTE — ASSESSMENT & PLAN NOTE
Lab Results   Component Value Date    HGBA1C 5 3 06/30/2021       No results for input(s): POCGLU in the last 72 hours      Blood Sugar Average: Last 72 hrs:     · Holding patient's home Lantus as he is NPO  · Accu-Cheks q 6 hours with sliding scale insulin

## 2021-07-04 NOTE — ASSESSMENT & PLAN NOTE
· Likely secondary to acute on chronic diastolic heart failure, volume overload, possible tracheobronchitis versus COPD exacerbation  · Patient states that he is not always compliant with CPAP at night, sometimes I forget"  · Was started on BiPAP at [facility] secondary to tachypnea and subjective dyspnea, will continue q h s  And p r n  · Patient noted to have elevated pCO2 on ABG, however pH is compensated  Serum CO2 elevated at baseline  · Patient had previous sleep study prior to weight loss surgery in 2018 with BiPAP pressures 22/12  He is not tolerating these pressures currently tolerating 18/5 in 40%  · Titrate FiO2 for SpO2 greater than 90%  · Patient presents with positive cough and stated yellow sputum production, subjective chills  Denies fever  No leukocytosis on CBC  Currently afebrile  No infiltrate noted on CXR    Will check procalcitonin if elevated will start antibiotics  · Monitor off antibiotics for now  · Of note patient has history of MRSA positive sputum  · Send MRSA culture  · Send urine antigen for strep pneumoniae and Legionella

## 2021-07-04 NOTE — PLAN OF CARE
Problem: MOBILITY - ADULT  Goal: Maintain or return to baseline ADL function  Description: INTERVENTIONS:  -  Assess patient's ability to carry out ADLs; assess patient's baseline for ADL function and identify physical deficits which impact ability to perform ADLs (bathing, care of mouth/teeth, toileting, grooming, dressing, etc )  - Assess/evaluate cause of self-care deficits   - Assess range of motion  - Assess patient's mobility; develop plan if impaired  - Assess patient's need for assistive devices and provide as appropriate  - Encourage maximum independence but intervene and supervise when necessary  - Involve family/S O in performance of ADLs  - Assess for home care needs following discharge   - Consider OT consult to assist with ADL evaluation and planning for discharge  - Provide patient education as appropriate  Outcome: Progressing  Goal: Maintains/Returns to pre admission functional level  Description: INTERVENTIONS:  - Perform BMAT or MOVE assessment daily    - Set and communicate daily mobility goal to care team and patient/family/caregiver  - Collaborate with rehabilitation services on mobility goals if consulted  - Perform Range of Motion on lower extremities 4 times a day  - Reposition patient every 2 hours    - Record patient progress and toleration of activity level   Outcome: Progressing     Problem: Potential for Falls  Goal: Patient will remain free of falls  Description: INTERVENTIONS:  - Educate patient/family on patient safety including physical limitations  - Instruct patient to call for assistance with activity   - Consult OT/PT to assist with strengthening/mobility   - Keep Call bell within reach  - Keep bed low and locked with side rails adjusted as appropriate  - Keep care items and personal belongings within reach  - Initiate and maintain comfort rounds  - Make Fall Risk Sign visible to staff  - Apply yellow socks and bracelet for high fall risk patients  - Consider moving patient to room near nurses station  Outcome: Progressing     Problem: Prexisting or High Potential for Compromised Skin Integrity  Goal: Skin integrity is maintained or improved  Description: INTERVENTIONS:  - Identify patients at risk for skin breakdown  - Assess and monitor skin integrity  - Assess and monitor nutrition and hydration status  - Monitor labs   - Assess for incontinence   - Turn and reposition patient  - Assist with mobility/ambulation  - Relieve pressure over bony prominences  - Avoid friction and shearing  - Provide appropriate hygiene as needed including keeping skin clean and dry  - Evaluate need for skin moisturizer/barrier cream  - Collaborate with interdisciplinary team   - Patient/family teaching  - Consider wound care consult   Outcome: Progressing

## 2021-07-04 NOTE — ED PROVIDER NOTES
History  Chief Complaint   Patient presents with    Shortness of Breath     Patient is a 50-year-old male seen in the emergency department brought by EMS from facility with concern for worsening shortness of breath over the past 2 days  Patient notes some intermittent chest discomfort as well  Patient notes cough productive of yellow sputum  Per EMS, patient has history of atrial fibrillation, typically rate controlled  Patient is also on Pradaxa at home  Patient notes no hemoptysis, headache, nausea, vomiting, abdominal pain, diarrhea  Patient states that he typically wears oxygen via nasal cannula 2 liters/minute  Patient also states that he uses CPAP at night  Prior to Admission Medications   Prescriptions Last Dose Informant Patient Reported? Taking? Cholecalciferol (VITAMIN D) 50 MCG (2000 UT) tablet 7/3/2021 at Unknown time Outside Facility (Specify) Yes Yes   Sig: Take 2,000 Units by mouth daily   Ferrous Sulfate  (45 Fe) MG TBCR 7/3/2021 at Unknown time Outside Facility ([facility] Yes Yes   Sig: Take by mouth   acetaminophen-codeine (TYLENOL #3) 300-30 mg per tablet 7/3/2021 at Unknown time Outside Facility (Specify) Yes Yes   Sig: Take 1 tablet by mouth every 6 (six) hours as needed   albuterol (2 5 mg/3 mL) 0 083 % nebulizer solution 7/3/2021 at Unknown time Outside Facility (Specify) Yes Yes   Sig: Take 2 5 mg by nebulization every 6 (six) hours as needed for wheezing or shortness of breath   buPROPion (WELLBUTRIN) 100 mg tablet  Outside Facility (Specify) No No   Sig: Take 1 tablet by mouth daily for 30 days   Patient taking differently: Take 150 mg by mouth daily    budesonide-formoterol (SYMBICORT) 160-4 5 mcg/act inhaler 7/3/2021 at Unknown time  Yes Yes   Sig: Inhale 2 puffs 2 (two) times a day Rinse mouth after use     calcium carbonate-vitamin D (OSCAL-D) 500 mg-200 units per tablet  Outside Facility (Specify) No No   Sig: Take 1 tablet by mouth daily with breakfast for 30 days   cyanocobalamin (VITAMIN B-12) 1000 MCG tablet 7/3/2021 at Unknown time Outside Facility ([address] [place] [address]) Yes Yes   Sig: Take 1,000 mcg by mouth daily   cyclobenzaprine (FLEXERIL) 10 mg tablet 7/3/2021 at Unknown time Outside Facility ([address] [place] [address]) Yes Yes   Sig: Take 10 mg by mouth 3 (three) times a day as needed for muscle spasms   dabigatran etexilate (PRADAXA) 150 mg capsu  Outside Facility (Specify) Yes No   Sig: Take 150 mg by mouth 2 (two) times a day   digoxin (LANOXIN) 0 125 mg tablet  Outside Facility (Specify) No No   Sig: Take 1 tablet by mouth daily for 30 days   docusate sodium (COLACE) 100 mg capsule 7/3/2021 at Unknown time  Yes Yes   Sig: Take 100 mg by mouth 2 (two) times a day   fluticasone (FLONASE) 50 mcg/act nasal spray 7/3/2021 at Unknown time  Yes Yes   Si spray into each nostril daily   furosemide (LASIX) 40 mg tablet 7/3/2021 at Unknown time  No Yes   Sig: Take 1 tablet (40 mg total) by mouth 2 (two) times a day   gabapentin (NEURONTIN) 300 mg capsule  Outside Facility (Specify) No No   Sig: Take 1 capsule by mouth 2 (two) times a day as needed (pain) for up to 30 days   insulin glargine (LANTUS) 100 units/mL subcutaneous injection  Outside Facility (Specify) No No   Sig: Inject 80 Units under the skin daily at bedtime for 30 days   Patient not taking: Reported on 2019   ipratropium-albuterol (DUO-NEB) 0 5-2 5 mg/3 mL nebulizer solution 7/3/2021 at Unknown time Outside Facility (Specify) Yes Yes   Sig: Take 3 mL by nebulization 4 (four) times a day   levothyroxine 25 mcg tablet 7/3/2021 at Unknown time Outside Facility (Specify) Yes Yes   Sig: Take 25 mcg by mouth daily   magnesium hydroxide (MILK OF MAGNESIA) 400 mg/5 mL oral suspension  Outside Facility (Specify) No No   Sig: Take 30 mL by mouth every 12 (twelve) hours as needed for constipation for up to 30 days   Patient not taking: Reported on 2019   methocarbamol (ROBAXIN) 750 mg tablet   Yes No   Sig: Take 750 mg by mouth every 6 (six) hours as needed for muscle spasms   metoprolol tartrate (LOPRESSOR) 25 mg tablet  Outside Facility (Specify) No No   Sig: Take 1 tablet by mouth every 12 (twelve) hours for 30 days   Patient taking differently: Take 50 mg by mouth every 12 (twelve) hours     multivitamin (THERAGRAN) TABS 7/3/2021 at Unknown time Outside Facility ([address] [place] [address]) Yes Yes   Sig: Take 1 tablet by mouth daily   pantoprazole (PROTONIX) 20 mg tablet 7/3/2021 at Unknown time Outside Facility ([address] [place] [address]) Yes Yes   Sig: Take 20 mg by mouth daily   polyethylene glycol (MIRALAX) 17 g packet  Outside Facility (Specify) No No   Sig: Take 17 g by mouth daily for 30 days   potassium chloride (K-DUR,KLOR-CON) 20 mEq tablet 7/3/2021 at Unknown time  No Yes   Sig: Take 1 tablet (20 mEq total) by mouth 2 (two) times a day   potassium chloride (KLOR-CON) 20 mEq packet 7/3/2021 at Unknown time Outside Facility (Specify) Yes Yes   Sig: Take 20 mEq by mouth 2 (two) times a day   primidone (MYSOLINE) 50 mg tablet  Outside Facility (Specify) No No   Sig: Take 1 tablet by mouth daily at bedtime for 30 days   senna (SENOKOT) 8 6 MG tablet  Outside Facility (Specify) No No   Sig: Take 1 tablet by mouth 2 (two) times a day for 30 days   spironolactone (ALDACTONE) 50 mg tablet 7/3/2021 at Unknown time Outside Facility (Specify) Yes Yes   Sig: Take 50 mg by mouth daily   traZODone (DESYREL) 100 mg tablet 7/3/2021 at Unknown time Outside Facility (Specify) Yes Yes   Sig: Take 50 mg by mouth daily at bedtime      Facility-Administered Medications: None       Past Medical History:   Diagnosis Date    [facility]     Anemia     Anxiety     Cancer ([facility] )     Cardiac disease     Cellulitis     COPD (chronic obstructive pulmonary disease) ([facility]     Depression     Diabetes mellitus ([facility] )     DVT (deep venous thrombosis) (HCC)     GERD (gastroesophageal reflux disease)     HBP (high blood pressure)     Heart failure (HCC)     Hx of blood clots     Hypertension     Hypokalemia     Hypothyroid     Obesity     Psychiatric disorder        Past Surgical History:   Procedure Laterality Date    ABDOMINAL HERNIA REPAIR  05/2019    CHOLECYSTECTOMY      Lap    GASTRECTOMY SLEEVE LAPAROSCOPIC  08/2018    KNEE SURGERY Right     open wound, injury     LEG SURGERY Right 2009       Family History   Problem Relation Age of Onset    Lung cancer Father     Diabetes Maternal Aunt     Heart attack Mother     Clotting disorder Mother     Hypertension Mother     Heart failure Mother     Diabetes Mother     Atrial fibrillation Mother     Sleep apnea Mother     Obesity Mother     Asthma Sister     Stroke Neg Hx     Anuerysm Neg Hx     Arrhythmia Neg Hx     Hyperlipidemia Neg Hx         pt unsure    Fainting Neg Hx      I have reviewed and agree with the history as documented  E-Cigarette/Vaping    E-Cigarette Use Never User      E-Cigarette/Vaping Substances     Social History     Tobacco Use    Smoking status: Former Smoker     Packs/day: 1 00     Years: 15 00     Pack years: 15 00    Smokeless tobacco: Never Used    Tobacco comment: 1 5ppd x 23 years, quit 2014   Vaping Use    Vaping Use: Never used   Substance Use Topics    Alcohol use: Not Currently    Drug use: No       Review of Systems   Constitutional: Negative for chills and fever  HENT: Negative for ear pain and sore throat  Eyes: Negative for pain and visual disturbance  Respiratory: Positive for cough, chest tightness and shortness of breath  Cardiovascular: Positive for chest pain  Negative for palpitations  Gastrointestinal: Negative for abdominal pain, diarrhea, nausea and vomiting  Genitourinary: Negative for dysuria and hematuria  Musculoskeletal: Negative for arthralgias and back pain  Skin: Negative for color change and rash  Neurological: Negative for seizures and syncope  All other systems reviewed and are negative        Physical Exam  Physical Exam  Vitals and nursing note reviewed  Constitutional:       Appearance: He is well-developed  HENT:      Head: Normocephalic and atraumatic  Right Ear: External ear normal       Left Ear: External ear normal       Nose: Nose normal    Eyes:      Conjunctiva/sclera: Conjunctivae normal    Cardiovascular:      Rate and Rhythm: Tachycardia present  Rhythm irregular  Heart sounds: No murmur heard  Pulmonary:      Effort: Respiratory distress present  Comments: Tachypneic; breath sounds decreased at the bases bilaterally  Abdominal:      Palpations: Abdomen is soft  Tenderness: There is no abdominal tenderness  Musculoskeletal:         General: Swelling present  No deformity  Cervical back: Neck supple  Comments: Pitting edema in lower extremities bilaterally   Skin:     General: Skin is warm and dry  Neurological:      General: No focal deficit present  Mental Status: He is alert and oriented to person, place, and time  Cranial Nerves: No cranial nerve deficit  Sensory: No sensory deficit     Psychiatric:         Mood and Affect: Mood normal          Behavior: Behavior normal          Vital Signs  ED Triage Vitals [07/03/21 2130]   Temperature Pulse Respirations Blood Pressure SpO2   98 7 °F (37 1 °C) (!) 153 (!) 28 157/72 98 %      Temp Source Heart Rate Source Patient Position - Orthostatic VS BP Location FiO2 (%)   Rectal Monitor Lying Left arm --      Pain Score       --           Vitals:    07/03/21 2130 07/03/21 2210 07/04/21 0143 07/04/21 0150   BP: 157/72 108/60 127/68    Pulse: (!) 153 101 (!) 118 90   Patient Position - Orthostatic VS: Lying Lying Lying          Visual Acuity      ED Medications  Medications   diltiazem (CARDIZEM) injection 50 mg (50 mg Intravenous Given 7/3/21 2124)   diltiazem (CARDIZEM) tablet 60 mg (60 mg Oral Given 7/3/21 2134)   furosemide (LASIX) injection 40 mg (40 mg Intravenous Given 7/3/21 2125)   diltiazem (CARDIZEM) injection 25 mg (25 mg Intravenous Given 7/4/21 0145)       Diagnostic Studies  Results Reviewed     Procedure Component Value Units Date/Time    Novel Coronavirus (Covid-19),PCR [facility] - 2 Hour Stat [[id number]]  (Normal) Collected: 07/03/21 2116    Lab Status: Final result Specimen: Nares from Nasopharyngeal Swab Updated: 07/03/21 2305     SARS-CoV-2 Negative    Narrative: The specimen collection materials, transport medium, and/or testing methodology utilized in the production of these test results have been proven to be reliable in a limited validation with an abbreviated program under the Emergency Utilization Authorization provided by the FDA  Testing reported as "Presumptive positive" will be confirmed with secondary testing to ensure result accuracy  Clinical caution and judgement should be used with the interpretation of these results with consideration of the clinical impression and other laboratory testing  Testing reported as "Positive" or "Negative" has been proven to be accurate according to standard laboratory validation requirements  All testing is performed with control materials showing appropriate reactivity at standard intervals        Blood gas, arterial [[id number]]  (Abnormal) Collected: 07/03/21 2241    Lab Status: Final result Specimen: Blood, Arterial from Radial, Right Updated: 07/03/21 2305     pH, Arterial 7 355     pCO2, Arterial 82 5 mm Hg      pO2, Arterial 96 8 mm Hg      HCO3, Arterial 45 0 mmol/L      Base Excess, Arterial 15 6 mmol/L      O2 Content, Arterial 17 6 mL/dL      O2 HGB,Arterial  95 1 %      SOURCE Radial, Right     DB TEST Yes     Temperature 98 7 Degrees Fehrenheit      Nasal Cannula 4    Influenza A/B and RSV PCR - 2 Hour Stat [[id number]]  (Normal) Collected: 07/03/21 2116    Lab Status: Final result Specimen: Nares from Nasopharyngeal Swab Updated: 07/03/21 2235     INFLUENZA A PCR None Detected     INFLUENZA B PCR None Detected     RSV PCR None Detected UA w Reflex to Microscopic w Reflex to Culture [[id number]]  (Abnormal) Collected: 07/03/21 2209    Lab Status: Final result Specimen: Urine, Clean Catch Updated: 07/03/21 2224     Color, UA Yellow     Clarity, UA Slightly Cloudy     Specific Weinert, UA 1 020     pH, UA 5 5     Leukocytes, UA Negative     Nitrite, UA Negative     Protein, UA Negative mg/dl      Glucose, UA Negative mg/dl      Ketones, UA Negative mg/dl      Urobilinogen, UA 0 2 E U /dl      Bilirubin, UA Negative     Blood, UA Negative    Troponin I [[id number]]  (Normal) Collected: 07/03/21 2114    Lab Status: Final result Specimen: Blood from Arm, Right Updated: 07/03/21 2220     Troponin I <0 03 ng/mL     Comprehensive metabolic panel [[id number]]  (Abnormal) Collected: 07/03/21 2114    Lab Status: Final result Specimen: Blood from Arm, Right Updated: 07/03/21 2217     Sodium 141 mmol/L      Potassium 3 7 mmol/L      Chloride 92 mmol/L      CO2 45 mmol/L      ANION GAP 4 mmol/L      BUN 15 mg/dL      Creatinine 0 91 mg/dL      Glucose 120 mg/dL      Calcium 9 3 mg/dL      AST 10 U/L      ALT 11 U/L      Alkaline Phosphatase 60 1 U/L      Total Protein 7 0 g/dL      Albumin 3 8 g/dL      Total Bilirubin 0 71 mg/dL      eGFR 98 ml/min/1 73sq m     Narrative:      [place] guidelines for Chronic Kidney Disease (CKD):     Stage 1 with normal or high GFR (GFR > 90 mL/min/1 73 square meters)    Stage 2 Mild CKD (GFR = 60-89 mL/min/1 73 square meters)    Stage 3A Moderate CKD (GFR = 45-59 mL/min/1 73 square meters)    Stage 3B Moderate CKD (GFR = 30-44 mL/min/1 73 square meters)    Stage 4 Severe CKD (GFR = 15-29 mL/min/1 73 square meters)    Stage 5 End Stage CKD (GFR <15 mL/min/1 73 square meters)  Note: GFR calculation is accurate only with a steady state creatinine    Digoxin level [[id number]]  (Abnormal) Collected: 07/03/21 2114    Lab Status: Final result Specimen: Blood from Arm, Right Updated: 07/03/21 2202 Digoxin Lvl <0 5 ng/mL     Magnesium [[id number]]  (Normal) Collected: 07/03/21 2114    Lab Status: Final result Specimen: Blood from Arm, Right Updated: 07/03/21 2201     Magnesium 1 9 mg/dL     B-Type Natriuretic Peptide ([address]) [[id number]]  (Abnormal) Collected: 07/03/21 2114    Lab Status: Final result Specimen: Blood from Arm, Right Updated: 07/03/21 2200      2 pg/mL     Lactic acid, plasma [[id number]]  (Normal) Collected: 07/03/21 2117    Lab Status: Final result Specimen: Blood from Arm, Right Updated: 07/03/21 2200     LACTIC ACID 1 5 mmol/L     Narrative:      Result may be elevated if tourniquet was used during collection      Protime-INR [[id number]]  (Normal) Collected: 07/03/21 2114    Lab Status: Final result Specimen: Blood from Arm, Left Updated: 07/03/21 2145     Protime 10 9 seconds      INR 0 96    Narrative:      INR Reference Ranges:  No Anticoagulant, Normal:           0 9-1 1  Standard Dose, Oral Anticoagulant:  2 0-3 0  High Dose, Oral Anticoagulant:      2 5-3 5    APTT [[id number]]  (Normal) Collected: 07/03/21 2114    Lab Status: Final result Specimen: Blood from Arm, Left Updated: 07/03/21 2145     PTT 27 seconds     CBC and differential [[id number]]  (Normal) Collected: 07/03/21 2114    Lab Status: Final result Specimen: Blood from Arm, Right Updated: 07/03/21 2133     WBC 7 45 Thousand/uL      RBC 4 12 Million/uL      Hemoglobin 12 7 g/dL      Hematocrit 39 8 %      MCV 97 fL      MCH 30 8 pg      MCHC 31 9 g/dL      RDW 13 3 %      MPV 10 1 fL      Platelets 181 Thousands/uL      Neutrophils Relative 75 %      Immat GRANS % 0 %      Lymphocytes Relative 17 %      Monocytes Relative 7 %      Eosinophils Relative 1 %      Basophils Relative 0 %      Neutrophils Absolute 5 53 Thousands/µL      Immature Grans Absolute 0 02 Thousand/uL      Lymphocytes Absolute 1 25 Thousands/µL      Monocytes Absolute 0 53 Thousand/µL      Eosinophils Absolute 0 09 Thousand/µL Basophils Absolute 0 03 Thousands/µL     Procalcitonin with AM Reflex [[id number]] Collected: 07/03/21 2114    Lab Status: In process Specimen: Blood from Arm, Right Updated: 07/03/21 2129    Blood culture #2 [[id number]] Collected: 07/03/21 2116    Lab Status: In process Specimen: Blood from Arm, Left Updated: 07/03/21 2128    Blood culture #1 [[id number]] Collected: 07/03/21 2116    Lab Status:  In process Specimen: Blood from Arm, Right Updated: 07/03/21 2128                 XR chest 1 view portable   ED Interpretation by Ros Turner MD (07/03 2117)   pulmonary edema                 Procedures  ECG 12 Lead Documentation Only    Date/Time: 7/3/2021 9:02 PM  Performed by: Ros Turner MD  Authorized by: Ros Turner MD     Indications / Diagnosis:  Tachycardia  ECG reviewed by me, the ED Provider: yes    Patient location:  ED  Rate:     ECG rate:  143    ECG rate assessment: tachycardic    Rhythm:     Rhythm: atrial fibrillation    ST segments:     ST segments:  Normal  T waves:     T waves: normal    Comments:      Atrial fibrillation at 143, normal axis, , no ST-T wave changes, no evidence of acute ischemia    CriticalCare Time  Performed by: Ros Turner MD  Authorized by: Ros Turner MD     Critical care provider statement:     Critical care time (minutes):  45    Critical care start time:  7/3/2021 11:00 PM    Critical care end time:  7/3/2021 11:45 PM    Critical care was necessary to treat or prevent imminent or life-threatening deterioration of the following conditions:  Circulatory failure and respiratory failure    Critical care was time spent personally by me on the following activities:  Ordering and performing treatments and interventions, obtaining history from patient or surrogate, development of treatment plan with patient or surrogate, ordering and review of laboratory studies, ordering and review of radiographic studies, re-evaluation of patient's condition, evaluation of patient's response to treatment and examination of patient    I assumed direction of critical care for this patient from another provider in my specialty: no               ED Course                                           MDM  Number of Diagnoses or Management Options  Atrial fibrillation with RVR ([facility] )  CHF exacerbation ([facility])  Hypercarbia  Shortness of breath  Diagnosis management comments: Patient is a 70-year-old male seen in the emergency department with concern for shortness of breath, and apparent atrial fibrillation with RVR  EKG was obtained and noted  Chest x-ray showed pulmonary edema  COVID-19 swab was ordered during global pandemic  Laboratory evaluation remarkable for elevated CO2 of 45, elevated BNP of 122 2  Evaluation is consistent with atrial fibrillation with RVR in addition to CHF exacerbation  Patient was treated with medication for rate control, with good effect  Patient required oxygen via nasal cannula at 5 liters/minute, increased from his baseline of 2 liters/minute  Plan to admit patient for further evaluation and treatment  Case was reviewed with ALESSANDRO Arellano, with plan for possible transfer to [address] [place] [address] for further evaluation and treatment  Case was reviewed with PAC  Patient was placed on BiPAP for hypercarbia  Plan to transfer patient to [address] [place] [address] ICU for further evaluation and treatment  Accepting physician is Dr Fredy Taylor         Amount and/or Complexity of Data Reviewed  Clinical lab tests: ordered and reviewed  Tests in the radiology section of CPT®: ordered and reviewed  Tests in the medicine section of CPT®: ordered and reviewed        Disposition  Final diagnoses:   Shortness of breath   Atrial fibrillation with RVR ([facility] )   CHF exacerbation ([facility] )   Hypercarbia     Time reflects when diagnosis was documented in both MDM as applicable and the Disposition within this note     Time User Action Codes Description Comment    7/3/2021  8:51 PM Abad Haynes Add [R06 02] Shortness of breath     7/3/2021  9:19 PM Gino [place] Add [I48 91] Atrial fibrillation with RVR ([facility] )     7/3/2021  9:19 PM Gino [place] Add [J81 1] Pulmonary edema     7/3/2021  9:19 PM Gino [place] Remove [J81 1] Pulmonary edema     7/3/2021  9:20 PM Gino [place] Add [I50 9] [facility] exacerbation ([facility] )     7/3/2021 11:42 PM Gino [place] Add [R06 89] Hypercarbia       ED Disposition     ED Disposition Condition Date/Time Comment    Transfer to Another Facility-In Network  [facility] Jul 3, 2021 11:42 PM Leonela Phelan should be transferred out to [facility](Dr Yessenia Muller)          MD Documentation      Most Recent Value   Patient Condition  The patient has been stabilized such that within reasonable medical probability, no material deterioration of the patient condition or the condition of the unborn child(margarita) is likely to result from the transfer   Reason for Transfer  Level of Care needed not available at this facility   Benefits of Transfer  Specialized equipment and/or services available at the receiving facility (Include comment)________________________ [stepdown]   Risks of Transfer  Potential for delay in receiving treatment, Potential deterioration of medical condition, Loss of IV   Accepting Physician  Dr Chato Hi Name, [address]    (Name & Tel number)  [facility]   Sending MD Dr Mayi Allen   Provider Certification  General risk, such as traffic hazards, adverse weather conditions, rough terrain or turbulence, possible failure of equipment (including vehicle or aircraft), or consequences of actions of persons outside the control of the transport personnel, Unanticipated needs of medical equipment and personnel during transport      RN Documentation      [address] Name, [address]    (Name & Tel number)  PAC      Follow-up Information    None         Patient's Medications   Discharge Prescriptions    No medications on file     No discharge procedures on file      PDMP Review     None          ED Provider  Electronically Signed by           Ramonita Narayan MD  07/03/21 0105       Ramonita Narayan MD  07/04/21 8033

## 2021-07-04 NOTE — PLAN OF CARE
Problem: MOBILITY - ADULT  Goal: Maintain or return to baseline ADL function  Description: INTERVENTIONS:  -  Assess patient's ability to carry out ADLs; assess patient's baseline for ADL function and identify physical deficits which impact ability to perform ADLs (bathing, care of mouth/teeth, toileting, grooming, dressing, etc )  - Assess/evaluate cause of self-care deficits   - Assess range of motion  - Assess patient's mobility; develop plan if impaired  - Assess patient's need for assistive devices and provide as appropriate  - Encourage maximum independence but intervene and supervise when necessary  - Involve family in performance of ADLs  - Assess for home care needs following discharge   - Consider OT consult to assist with ADL evaluation and planning for discharge  - Provide patient education as appropriate  Outcome: Progressing  Goal: Maintains/Returns to pre admission functional level  Description: INTERVENTIONS:  - Perform BMAT or MOVE assessment daily    - Set and communicate daily mobility goal to care team and patient/family/caregiver  - Collaborate with rehabilitation services on mobility goals if consulted  - Reposition patient every 2 hours      - Out of bed for toileting  - Record patient progress and toleration of activity level   Outcome: Progressing     Problem: Potential for Falls  Goal: Patient will remain free of falls  Description: INTERVENTIONS:  - Educate patient/family on patient safety including physical limitations  - Instruct patient to call for assistance with activity   - Consult OT/PT to assist with strengthening/mobility   - Keep Call bell within reach  - Keep bed low and locked with side rails adjusted as appropriate  - Keep care items and personal belongings within reach  - Initiate and maintain comfort rounds  - Make Fall Risk Sign visible to staff    - Apply yellow socks and bracelet for high fall risk patients  - Consider moving patient to room near nurses station  Outcome: Progressing     Problem: Prexisting or High Potential for Compromised Skin Integrity  Goal: Skin integrity is maintained or improved  Description: INTERVENTIONS:  - Identify patients at risk for skin breakdown  - Assess and monitor skin integrity  - Assess and monitor nutrition and hydration status  - Monitor labs   - Assess for incontinence   - Turn and reposition patient  - Assist with mobility/ambulation  - Relieve pressure over bony prominences  - Avoid friction and shearing  - Provide appropriate hygiene as needed including keeping skin clean and dry  - Evaluate need for skin moisturizer/barrier cream  - Collaborate with interdisciplinary team   - Patient/family teaching  - Consider wound care consult   Outcome: Progressing

## 2021-07-04 NOTE — ED NOTES
Transfer Information:   with [facility] @ [address] ICU #1  Dr Angeline Mulligan accepting  Call report: [address],Michele 206, RN  07/04/21 [id number]

## 2021-07-04 NOTE — ASSESSMENT & PLAN NOTE
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

## 2021-07-04 NOTE — EMTALA/ACUTE CARE TRANSFER
[facility] EMERGENCY DEPARTMENT  [address] [facility] [address]  Dept: [phone]      EMTALA TRANSFER CONSENT    NAME Rylee Elkins                                         1970                              MRN [id number]    I have been informed of my rights regarding examination, treatment, and transfer   by Dr Sarahann Cranker, MD    Benefits: Specialized equipment and/or services available at the receiving facility (Include comment)________________________ (stepdown)    Risks: Potential for delay in receiving treatment, Potential deterioration of medical condition, Loss of IV      Consent for Transfer:  I acknowledge that my medical condition has been evaluated and explained to me by the emergency department physician or other qualified medical person and/or my attending physician, who has recommended that I be transferred to the service of  Accepting Physician: Dr Kaela Jaffe at [address] [facility] Name, [address] : Elmer Tejeda  The above potential benefits of such transfer, the potential risks associated with such transfer, and the probable risks of not being transferred have been explained to me, and I fully understand them  The doctor has explained that, in my case, the benefits of transfer outweigh the risks  I agree to be transferred  I authorize the performance of emergency medical procedures and treatments upon me in both transit and upon arrival at the receiving facility  Additionally, I authorize the release of any and all medical records to the receiving facility and request they be transported with me, if possible  I understand that the safest mode of transportation during a medical emergency is an ambulance and that the Hospital advocates the use of this mode of transport   Risks of traveling to the receiving facility by car, including absence of medical control, life sustaining equipment, such as oxygen, and medical personnel has been explained to me and I fully understand them  (BONITA CORRECT BOX BELOW)  [  ]  I consent to the stated transfer and to be transported by ambulance/helicopter  [  ]  I consent to the stated transfer, but refuse transportation by ambulance and accept full responsibility for my transportation by car  I understand the risks of non-ambulance transfers and I exonerate the Hospital and its staff from any deterioration in my condition that results from this refusal     X___________________________________________    DATE  21  TIME________  Signature of patient or legally responsible individual signing on patient behalf           RELATIONSHIP TO PATIENT_________________________          Provider Certification    NAME Amelia Pennington DOB 1970                              MRN [id number]    A medical screening exam was performed on the above named patient  Based on the examination:    Condition Necessitating Transfer The primary encounter diagnosis was Shortness of breath  Diagnoses of Atrial fibrillation with RVR ([address] ), CHF exacerbation ([facility] [address] ), and Hypercarbia were also pertinent to this visit      Patient Condition: The patient has been stabilized such that within reasonable medical probability, no material deterioration of the patient condition or the condition of the unborn child(margarita) is likely to result from the transfer    Reason for Transfer: Level of Care needed not available at this facility    Transfer Requirements: [address]   · Space available and qualified personnel available for treatment as acknowledged by [facility]  · Agreed to accept transfer and to provide appropriate medical treatment as acknowledged by       Dr Roseann Santana  · Appropriate medical records of the examination and treatment of the patient are provided at the time of transfer   [address] _______  · Transfer will be performed by qualified personnel from    and appropriate transfer equipment as required, including the use of necessary and appropriate life support measures  Provider Certification: I have examined the patient and explained the following risks and benefits of being transferred/refusing transfer to the patient/family:  General risk, such as traffic hazards, adverse weather conditions, rough terrain or turbulence, possible failure of equipment (including vehicle or aircraft), or consequences of actions of persons outside the control of the transport personnel, Unanticipated needs of medical equipment and personnel during transport      Based on these reasonable risks and benefits to the patient and/or the unborn child(margarita), and based upon the information available at the time of the patients examination, I certify that the medical benefits reasonably to be expected from the provision of appropriate medical treatments at another medical facility outweigh the increasing risks, if any, to the individuals medical condition, and in the case of labor to the unborn child, from effecting the transfer      X____________________________________________ DATE 07/03/21        TIME_______      ORIGINAL - SEND TO MEDICAL RECORDS   COPY - SEND WITH PATIENT DURING TRANSFER

## 2021-07-04 NOTE — H&P
[facility]  H&P- Tye Patel 1970, 49 y o  male MRN: [id number]  Unit/Bed#: ICU 01 Encounter: [id number]  Primary Care Provider: Robina Osler, MD   Date and time admitted to hospital: 7/4/2021  4:06 AM    Acute on chronic respiratory failure with hypoxia and hypercapnia ([address] )  Assessment & Plan  · Likely secondary to acute on chronic diastolic heart failure, volume overload, possible tracheobronchitis versus COPD exacerbation  · Patient states that he is not always compliant with CPAP at night, sometimes I forget"  · Was started on BiPAP at [facility] secondary to tachypnea and subjective dyspnea, will continue q h s  And p r n  · Patient noted to have elevated pCO2 on ABG, however pH is compensated  Serum CO2 elevated at baseline  · Patient had previous sleep study prior to weight loss surgery in 2018 with BiPAP pressures 22/12  He is not tolerating these pressures currently tolerating 18/5 in 40%  · Titrate FiO2 for SpO2 greater than 90%  · Patient presents with positive cough and stated yellow sputum production, subjective chills  Denies fever  No leukocytosis on CBC  Currently afebrile  No infiltrate noted on CXR  Will check procalcitonin if elevated will start antibiotics  · Monitor off antibiotics for now  · Of note patient has history of MRSA positive sputum  · Send MRSA culture  · Send urine antigen for strep pneumoniae and Legionella    Acute on chronic diastolic congestive heart failure [facility])  Assessment & Plan  Wt Readings from Last 3 Encounters:   07/04/21 (!) 225 kg (496 lb 7 6 oz)   07/03/21 (!) 239 kg (526 lb)   05/16/21 (!) 239 kg (526 lb 14 4 oz)     · Secondary to volume overload  · Chest x-ray pulmonary vascular congestion, volume overload  · Patient states that he feels he is not urinating nearly as much as he was previously with his Lasix b i d   Regimen  · Was given Lasix 40 mg IV x1 at [facility] in the ER with approximately 300 mL urine output  · Give repeat dose of Lasix 40 mg IV x1 now  · Holding home p o  Lasix for now, will diurese as needed  · Midsternal chest pain, reproducible upon palpation  · No evidence of ischemia on EKG  · Continue telemetry  · Check troponin, proBNP  · Had recent echo 05/2021 EF 60%, noted as technically difficult study secondary to patient's body habitus  · Daily weights        Atrial fibrillation ([address] )  Assessment & Plan  · AFib at baseline  · Patient is typically rate controlled at home with Lopressor  · Received Cardizem at [facility] in ER for AFib with RVR  · Arrived with AFib rates 110s to 120s  · Give Lopressor IV 5 mg x 1 now  · Continue home Lopressor p o  25 mg q 12 hours  · Patient anticoagulated on Pradaxa, will continue  · Of note he also has history of DVT with PE in 2019 status post IVC filter    COPD (chronic obstructive pulmonary disease) ([facility] )  Assessment & Plan  · Patient with complaints of shortness of breath, yellow sputum production  Denies fevers  Denies increased albuterol nebulizer  · Start azithromycin  mg Q 24 hours x3 days  · Diminished breath sounds noted bilaterally, no evidence of wheezing  · Continue Symbicort  · Start nebulizers as needed with Xopenex/Atrovent for shortness of breath/wheeze  · Given no wheezing noted will hold off on systemic steroids for now    Diabetes mellitus [facility])  Assessment & Plan  Lab Results   Component Value Date    HGBA1C 5 3 06/30/2021       No results for input(s): POCGLU in the last 72 hours      Blood Sugar Average: Last 72 hrs:     · Holding patient's home Lantus as he is NPO  · Accu-Cheks q 6 hours with sliding scale insulin    Hypothyroidism  Assessment & Plan  · Continue home levothyroxine    Obesity hypoventilation syndrome (HCC)  Assessment & Plan  · Continue BiPAP 18/5 qhs and as needed    Morbid obesity ([facility] )  Assessment & Plan  · History of gastric bypass surgery 2018  · BMI on admission 70 23 kg/m2  · Nutrition consult    -------------------------------------------------------------------------------------------------------------  Chief Complaint:  Shortness of breath    History of Present Illness     Petr Menjivar is a 49 y o  male who presents with past medical history significant for diastolic heart failure, chronic hypoxic and hypercapnic respiratory failure, atrial fibrillation on anticoagulation, COPD, morbid obesity, diabetes mellitus, hypothyroidism  Patient is a resident of [address] [place] [address]  He presented early this evening to [facility] ER with complaint of increased shortness of breath, and intermittent yellow sputum production  He was initially placed on nasal cannula with SpO2 high 90s  CXR positive for volume overload was given Lasix IV 40 mg x 1, and started on BiPAP for increased work of breathing  He was also in AFib with RVR on presentation to the emergency department  He was treated with Cardizem with improvement in rate control  He was transferred to [facility] for step-down level 1 admission under critical care medicine  History obtained from chart review and the patient   -------------------------------------------------------------------------------------------------------------  Dispo: Admit to Stepdown Level 1    Code Status: Level 1 - Full Code  --------------------------------------------------------------------------------------------------------------  Review of Systems   Constitutional: Positive for chills  Negative for appetite change, fatigue and fever  HENT: Negative  Eyes: Negative  Respiratory: Positive for cough and shortness of breath  Negative for chest tightness and wheezing  Cardiovascular: Positive for chest pain  Negative for palpitations and leg swelling  Gastrointestinal: Negative for abdominal distention, abdominal pain, diarrhea, nausea and vomiting  Endocrine: Negative  Genitourinary: Positive for decreased urine volume   Negative for difficulty urinating, frequency and hematuria  Musculoskeletal: Negative for arthralgias and myalgias  Skin: Negative for color change and rash  Allergic/Immunologic: Negative  Neurological: Negative for dizziness, seizures, weakness and headaches  Hematological: Negative  Psychiatric/Behavioral: Negative for agitation and confusion  A 12-point, complete review of systems was reviewed and negative except as stated above     Physical Exam  Constitutional:       Appearance: He is obese  He is ill-appearing  He is not toxic-appearing  Interventions: Face mask in place  HENT:      Head: Normocephalic and atraumatic  Right Ear: External ear normal       Left Ear: External ear normal       Nose: Nose normal  No congestion or rhinorrhea  Mouth/Throat:      Mouth: Mucous membranes are moist       Pharynx: Oropharynx is clear  No oropharyngeal exudate or posterior oropharyngeal erythema  Eyes:      General: No scleral icterus  Extraocular Movements: Extraocular movements intact  Conjunctiva/sclera: Conjunctivae normal       Pupils: Pupils are equal, round, and reactive to light  Neck:      Vascular: No carotid bruit  Cardiovascular:      Rate and Rhythm: Tachycardia present  Rhythm irregular  Pulses:           Radial pulses are 2+ on the right side and 2+ on the left side  Dorsalis pedis pulses are 2+ on the right side and 2+ on the left side  Heart sounds: S1 normal and S2 normal  No murmur heard  No friction rub  No gallop  Pulmonary:      Effort: Tachypnea present  No accessory muscle usage  Breath sounds: Decreased breath sounds present  No wheezing, rhonchi or rales  Abdominal:      General: Bowel sounds are normal  There is no distension  Palpations: Abdomen is soft  Tenderness: There is no abdominal tenderness  Musculoskeletal:         General: No swelling or tenderness  Normal range of motion        Cervical back: Normal range of motion and neck supple  Right lower le+ Pitting Edema present  Left lower le+ Pitting Edema present  Lymphadenopathy:      Cervical: No cervical adenopathy  Skin:     General: Skin is warm and dry  Capillary Refill: Capillary refill takes less than 2 seconds  Coloration: Skin is not pale  Findings: No erythema or rash  Neurological:      General: No focal deficit present  Mental Status: He is alert and oriented to person, place, and time  Cranial Nerves: No cranial nerve deficit  Sensory: No sensory deficit  Psychiatric:         Mood and Affect: Mood normal          Behavior: Behavior normal  Behavior is cooperative  Thought Content: Thought content normal          Judgment: Judgment normal        --------------------------------------------------------------------------------------------------------------  Vitals:   Vitals:    21 0407 21 0408   BP:  137/61   BP Location:  Left arm   Pulse:  (!) 118   Resp:  (!) 32   Temp:  97 8 °F (36 6 °C)   TempSrc:  Oral   SpO2: 91% 92%   Weight:  (!) 225 kg (496 lb 7 6 oz)     Temp  Min: 97 8 °F (36 6 °C)  Max: 98 7 °F (37 1 °C)        Body mass index is 70 23 kg/m²      Laboratory and Diagnostics:  Results from last 7 days   Lab Units 21   WBC Thousand/uL 7 45   HEMOGLOBIN g/dL 12 7   HEMATOCRIT % 39 8   PLATELETS Thousands/uL 161   NEUTROS PCT % 75   MONOS PCT % 7     Results from last 7 days   Lab Units 21   SODIUM mmol/L 141   POTASSIUM mmol/L 3 7   CHLORIDE mmol/L 92*   CO2 mmol/L 45*   ANION GAP mmol/L 4   BUN mg/dL 15   CREATININE mg/dL 0 91   CALCIUM mg/dL 9 3   GLUCOSE RANDOM mg/dL 120   ALT U/L 11   AST U/L 10*   ALK PHOS U/L 60 1   ALBUMIN g/dL 3 8   TOTAL BILIRUBIN mg/dL 0 71     Results from last 7 days   Lab Units 07/03/21  2114   MAGNESIUM mg/dL 1 9      Results from last 7 days   Lab Units 21  2114   INR  0 96   PTT seconds 27      Results from last 7 days Lab Units 07/03/21  2114   TROPONIN I ng/mL <0 03     Results from last 7 days   Lab Units 07/03/21  2117   LACTIC ACID mmol/L 1 5     ABG:  Results from last 7 days   Lab Units 07/03/21  2241   PH ART  7 355   PCO2 ART mm Hg 82 5*   PO2 ART mm Hg 96 8   HCO3 ART mmol/L 45 0*   BASE EXC ART mmol/L 15 6   ABG SOURCE  Radial, Right     VBG:  Results from last 7 days   Lab Units 07/03/21  2241   ABG SOURCE  Radial, Right           Micro:     UA with reflex pending  Blood cultures obtained at [facility]  Repeat blood cultures obtained on arrival to [facility] [employer]    EKG:  AFib with RVR on telemetry  Imaging: I have personally reviewed pertinent reports     and I have personally reviewed pertinent films in PACS      Historical Information   Past Medical History:   Diagnosis Date    Afib ([facility] )     Anemia     Anxiety     Cancer ([facility] )     Cardiac disease     Cellulitis     COPD (chronic obstructive pulmonary disease) ([facility] )     Depression     Diabetes mellitus ([facility] )     DVT (deep venous thrombosis) (HCC)     GERD (gastroesophageal reflux disease)     HBP (high blood pressure)     Heart failure (HCC)     Hx of blood clots     Hypertension     Hypokalemia     Hypothyroid     Obesity     Psychiatric disorder      Past Surgical History:   Procedure Laterality Date    ABDOMINAL HERNIA REPAIR  05/2019    CHOLECYSTECTOMY      Lap    GASTRECTOMY SLEEVE LAPAROSCOPIC  08/2018    KNEE SURGERY Right     open wound, injury     LEG SURGERY Right 2009     Social History   Social History     Substance and Sexual Activity   Alcohol Use Not Currently     Social History     Substance and Sexual Activity   Drug Use No     Social History     Tobacco Use   Smoking Status Former Smoker    Packs/day: 1 00    Years: 15 00    Pack years: 15 00   Smokeless Tobacco Never Used   Tobacco Comment    1 5ppd x 23 years, quit 2014     Exercise History:  Needs assistance  Family History:   Family History   Problem Relation Age of Onset  Lung cancer Father     Diabetes Maternal Aunt     Heart attack Mother     Clotting disorder Mother     Hypertension Mother     Heart failure Mother     Diabetes Mother     Atrial fibrillation Mother     Sleep apnea Mother     Obesity Mother     Asthma Sister     Stroke Neg Hx     Anuerysm Neg Hx     Arrhythmia Neg Hx     Hyperlipidemia Neg Hx         pt unsure    Fainting Neg Hx      I have reviewed this patient's family history and commented on sigificant items within the HPI      Medications:  Current Facility-Administered Medications   Medication Dose Route Frequency    acetaminophen (TYLENOL) tablet 650 mg  650 mg Oral Q6H PRN    azithromycin (ZITHROMAX) 500 mg in sodium chloride 0 9 % 250 mL IVPB  500 mg Intravenous Q24H    budesonide-formoterol (SYMBICORT) 160-4 5 mcg/act inhaler 2 puff  2 puff Inhalation BID    buPROPion (WELLBUTRIN) tablet 100 mg  100 mg Oral Daily    cholecalciferol (VITAMIN D3) tablet 2,000 Units  2,000 Units Oral Daily    cyanocobalamin (VITAMIN B-12) tablet 1,000 mcg  1,000 mcg Oral Daily    dabigatran etexilate (PRADAXA) capsule 150 mg  150 mg Oral BID    furosemide (LASIX) injection 40 mg  40 mg Intravenous Once    insulin lispro (HumaLOG) 100 units/mL subcutaneous injection 1-6 Units  1-6 Units Subcutaneous Q6H [address]    levalbuterol (XOPENEX) inhalation solution 1 25 mg  1 25 mg Nebulization Q6H PRN    And    ipratropium (ATROVENT) 0 02 % inhalation solution 0 5 mg  0 5 mg Nebulization Q6H PRN    levothyroxine tablet 25 mcg  25 mcg Oral Early Morning    metoprolol (LOPRESSOR) injection 5 mg  5 mg Intravenous Once    metoprolol tartrate (LOPRESSOR) tablet 25 mg  25 mg Oral Q12H [address]    pantoprazole (PROTONIX) EC tablet 20 mg  20 mg Oral Early Morning     Home medications:  Prior to Admission Medications   Prescriptions Last Dose Informant Patient Reported? Taking?    Cholecalciferol (VITAMIN D) 50 MCG (2000 UT) tablet  Outside Facility (Specify) Yes No Sig: Take 2,000 Units by mouth daily   Ferrous Sulfate  (45 Fe) MG TBCR  Outside Facility (Specify) Yes No   Sig: Take by mouth   acetaminophen-codeine (TYLENOL #3) 300-30 mg per tablet  Outside Facility (Specify) Yes No   Sig: Take 1 tablet by mouth every 6 (six) hours as needed   albuterol (2 5 mg/3 mL) 0 083 % nebulizer solution  Outside Facility (Specify) Yes No   Sig: Take 2 5 mg by nebulization every 6 (six) hours as needed for wheezing or shortness of breath   buPROPion (WELLBUTRIN) 100 mg tablet  Outside Facility (Specify) No No   Sig: Take 1 tablet by mouth daily for 30 days   Patient taking differently: Take 150 mg by mouth daily    budesonide-formoterol (SYMBICORT) 160-4 5 mcg/act inhaler   Yes No   Sig: Inhale 2 puffs 2 (two) times a day Rinse mouth after use     calcium carbonate-vitamin D (OSCAL-D) 500 mg-200 units per tablet  Outside Facility (Specify) No No   Sig: Take 1 tablet by mouth daily with breakfast for 30 days   cyanocobalamin (VITAMIN B-12) 1000 MCG tablet  Outside Facility (Specify) Yes No   Sig: Take 1,000 mcg by mouth daily   cyclobenzaprine (FLEXERIL) 10 mg tablet  Outside Facility (Specify) Yes No   Sig: Take 10 mg by mouth 3 (three) times a day as needed for muscle spasms   dabigatran etexilate (PRADAXA) 150 mg capsu  Outside Facility (Specify) Yes No   Sig: Take 150 mg by mouth 2 (two) times a day   digoxin (LANOXIN) 0 125 mg tablet  Outside Facility (Specify) No No   Sig: Take 1 tablet by mouth daily for 30 days   docusate sodium (COLACE) 100 mg capsule   Yes No   Sig: Take 100 mg by mouth 2 (two) times a day   fluticasone (FLONASE) 50 mcg/act nasal spray   Yes No   Si spray into each nostril daily   furosemide (LASIX) 40 mg tablet   No No   Sig: Take 1 tablet (40 mg total) by mouth 2 (two) times a day   gabapentin (NEURONTIN) 300 mg capsule  Outside Facility (Specify) No No   Sig: Take 1 capsule by mouth 2 (two) times a day as needed (pain) for up to 30 days   insulin glargine (LANTUS) 100 units/mL subcutaneous injection  Outside Facility (Specify) No No   Sig: Inject 80 Units under the skin daily at bedtime for 30 days   Patient not taking: Reported on 12/6/2019   ipratropium-albuterol (DUO-NEB) 0 5-2 5 mg/3 mL nebulizer solution  Outside Facility (Specify) Yes No   Sig: Take 3 mL by nebulization 4 (four) times a day   levothyroxine 25 mcg tablet  Outside Facility (Specify) Yes No   Sig: Take 25 mcg by mouth daily   magnesium hydroxide (MILK OF MAGNESIA) 400 mg/5 mL oral suspension  Outside Facility (Specify) No No   Sig: Take 30 mL by mouth every 12 (twelve) hours as needed for constipation for up to 30 days   Patient not taking: Reported on 12/6/2019   methocarbamol (ROBAXIN) 750 mg tablet   Yes No   Sig: Take 750 mg by mouth every 6 (six) hours as needed for muscle spasms   metoprolol tartrate (LOPRESSOR) 25 mg tablet  Outside Facility (Specify) No No   Sig: Take 1 tablet by mouth every 12 (twelve) hours for 30 days   Patient taking differently: Take 50 mg by mouth every 12 (twelve) hours     multivitamin (THERAGRAN) TABS  Outside Facility (Specify) Yes No   Sig: Take 1 tablet by mouth daily   pantoprazole (PROTONIX) 20 mg tablet  Outside Facility (Specify) Yes No   Sig: Take 20 mg by mouth daily   polyethylene glycol (MIRALAX) 17 g packet  Outside Facility (Specify) No No   Sig: Take 17 g by mouth daily for 30 days   potassium chloride (K-DUR,KLOR-CON) 20 mEq tablet   No No   Sig: Take 1 tablet (20 mEq total) by mouth 2 (two) times a day   potassium chloride (KLOR-CON) 20 mEq packet  Outside Facility (Specify) Yes No   Sig: Take 20 mEq by mouth 2 (two) times a day   primidone (MYSOLINE) 50 mg tablet  Outside Facility (Specify) No No   Sig: Take 1 tablet by mouth daily at bedtime for 30 days   senna (SENOKOT) 8 6 MG tablet  Outside Facility (Specify) No No   Sig: Take 1 tablet by mouth 2 (two) times a day for 30 days   spironolactone (ALDACTONE) 50 mg tablet  Outside Facility (Specify) Yes No   Sig: Take 50 mg by mouth daily   traZODone (DESYREL) 100 mg tablet  Outside Facility (Specify) Yes No   Sig: Take 50 mg by mouth daily at bedtime      Facility-Administered Medications: None     Allergies: Allergies   Allergen Reactions    Penicillins Hives       ------------------------------------------------------------------------------------------------------------  Advance Directive and Living Will:      Power of :    POLST:    ------------------------------------------------------------------------------------------------------------  Anticipated Length of Stay is > 2 midnights    Care Time Delivered:   Upon my evaluation, this patient had a high probability of imminent or life-threatening deterioration due to Acute hypoxic and hypercapnic respiratory failure likely in the setting of acute on chronic diastolic heart failure, COPD exacerbation versus tracheobronchitis, which required my direct attention, intervention, and personal management  I have personally provided 35 minutes ([phone]) of critical care time, exclusive of procedures, teaching, family meetings, and any prior time recorded by providers other than myself  RADHA Vela        Portions of the record may have been created with voice recognition software  Occasional wrong word or "sound a like" substitutions may have occurred due to the inherent limitations of voice recognition software    Read the chart carefully and recognize, using context, where substitutions have occurred

## 2021-07-05 LAB
ALBUMIN SERPL BCP-MCNC: 2.9 G/DL (ref 3.5–5)
ALP SERPL-CCNC: 67 U/L (ref 46–116)
ALT SERPL W P-5'-P-CCNC: 15 U/L (ref 12–78)
ANION GAP SERPL CALCULATED.3IONS-SCNC: -3 MMOL/L (ref 4–13)
AST SERPL W P-5'-P-CCNC: 13 U/L (ref 5–45)
BASOPHILS # BLD AUTO: 0.03 THOUSANDS/ΜL (ref 0–0.1)
BASOPHILS NFR BLD AUTO: 1 % (ref 0–1)
BILIRUB SERPL-MCNC: 1.06 MG/DL (ref 0.2–1)
BUN SERPL-MCNC: 12 MG/DL (ref 5–25)
CALCIUM ALBUM COR SERPL-MCNC: 9.7 MG/DL (ref 8.3–10.1)
CALCIUM SERPL-MCNC: 8.8 MG/DL (ref 8.3–10.1)
CHLORIDE SERPL-SCNC: 101 MMOL/L (ref 100–108)
CO2 SERPL-SCNC: 45 MMOL/L (ref 21–32)
CREAT SERPL-MCNC: 0.92 MG/DL (ref 0.6–1.3)
EOSINOPHIL # BLD AUTO: 0.13 THOUSAND/ΜL (ref 0–0.61)
EOSINOPHIL NFR BLD AUTO: 2 % (ref 0–6)
ERYTHROCYTE [DISTWIDTH] IN BLOOD BY AUTOMATED COUNT: 13.6 % (ref 11.6–15.1)
GFR SERPL CREATININE-BSD FRML MDRD: 97 ML/MIN/1.73SQ M
GLUCOSE SERPL-MCNC: 108 MG/DL (ref 65–140)
GLUCOSE SERPL-MCNC: 117 MG/DL (ref 65–140)
GLUCOSE SERPL-MCNC: 118 MG/DL (ref 65–140)
GLUCOSE SERPL-MCNC: 133 MG/DL (ref 65–140)
GLUCOSE SERPL-MCNC: 158 MG/DL (ref 65–140)
GLUCOSE SERPL-MCNC: 166 MG/DL (ref 65–140)
HCT VFR BLD AUTO: 37.9 % (ref 36.5–49.3)
HGB BLD-MCNC: 12.1 G/DL (ref 12–17)
IMM GRANULOCYTES # BLD AUTO: 0.02 THOUSAND/UL (ref 0–0.2)
IMM GRANULOCYTES NFR BLD AUTO: 0 % (ref 0–2)
LYMPHOCYTES # BLD AUTO: 1.12 THOUSANDS/ΜL (ref 0.6–4.47)
LYMPHOCYTES NFR BLD AUTO: 19 % (ref 14–44)
MAGNESIUM SERPL-MCNC: 2 MG/DL (ref 1.6–2.6)
MCH RBC QN AUTO: 30.9 PG (ref 26.8–34.3)
MCHC RBC AUTO-ENTMCNC: 31.9 G/DL (ref 31.4–37.4)
MCV RBC AUTO: 97 FL (ref 82–98)
MONOCYTES # BLD AUTO: 0.35 THOUSAND/ΜL (ref 0.17–1.22)
MONOCYTES NFR BLD AUTO: 6 % (ref 4–12)
MRSA NOSE QL CULT: ABNORMAL
MRSA NOSE QL CULT: ABNORMAL
NEUTROPHILS # BLD AUTO: 4.38 THOUSANDS/ΜL (ref 1.85–7.62)
NEUTS SEG NFR BLD AUTO: 72 % (ref 43–75)
NRBC BLD AUTO-RTO: 0 /100 WBCS
PLATELET # BLD AUTO: 117 THOUSANDS/UL (ref 149–390)
PMV BLD AUTO: 10.4 FL (ref 8.9–12.7)
POTASSIUM SERPL-SCNC: 3.4 MMOL/L (ref 3.5–5.3)
PROCALCITONIN SERPL-MCNC: <0.05 NG/ML
PROT SERPL-MCNC: 6.5 G/DL (ref 6.4–8.2)
RBC # BLD AUTO: 3.92 MILLION/UL (ref 3.88–5.62)
SODIUM SERPL-SCNC: 143 MMOL/L (ref 136–145)
WBC # BLD AUTO: 6.03 THOUSAND/UL (ref 4.31–10.16)

## 2021-07-05 PROCEDURE — 80053 COMPREHEN METABOLIC PANEL: CPT | Performed by: PHYSICIAN ASSISTANT

## 2021-07-05 PROCEDURE — 94762 N-INVAS EAR/PLS OXIMTRY CONT: CPT

## 2021-07-05 PROCEDURE — 97110 THERAPEUTIC EXERCISES: CPT

## 2021-07-05 PROCEDURE — 83735 ASSAY OF MAGNESIUM: CPT | Performed by: PHYSICIAN ASSISTANT

## 2021-07-05 PROCEDURE — 94760 N-INVAS EAR/PLS OXIMETRY 1: CPT

## 2021-07-05 PROCEDURE — 82948 REAGENT STRIP/BLOOD GLUCOSE: CPT

## 2021-07-05 PROCEDURE — 84145 PROCALCITONIN (PCT): CPT | Performed by: NURSE PRACTITIONER

## 2021-07-05 PROCEDURE — 97163 PT EVAL HIGH COMPLEX 45 MIN: CPT

## 2021-07-05 PROCEDURE — 99291 CRITICAL CARE FIRST HOUR: CPT | Performed by: INTERNAL MEDICINE

## 2021-07-05 PROCEDURE — 94003 VENT MGMT INPAT SUBQ DAY: CPT

## 2021-07-05 PROCEDURE — 85025 COMPLETE CBC W/AUTO DIFF WBC: CPT | Performed by: PHYSICIAN ASSISTANT

## 2021-07-05 RX ORDER — POTASSIUM CHLORIDE 20 MEQ/1
40 TABLET, EXTENDED RELEASE ORAL ONCE
Status: COMPLETED | OUTPATIENT
Start: 2021-07-05 | End: 2021-07-05

## 2021-07-05 RX ORDER — FUROSEMIDE 10 MG/ML
80 INJECTION INTRAMUSCULAR; INTRAVENOUS ONCE
Status: COMPLETED | OUTPATIENT
Start: 2021-07-05 | End: 2021-07-05

## 2021-07-05 RX ADMIN — INSULIN LISPRO 1 UNITS: 100 INJECTION, SOLUTION INTRAVENOUS; SUBCUTANEOUS at 21:01

## 2021-07-05 RX ADMIN — BUPROPION HYDROCHLORIDE 100 MG: 100 TABLET, FILM COATED ORAL at 08:14

## 2021-07-05 RX ADMIN — FUROSEMIDE 80 MG: 10 INJECTION, SOLUTION INTRAMUSCULAR; INTRAVENOUS at 11:56

## 2021-07-05 RX ADMIN — DABIGATRAN ETEXILATE MESYLATE 150 MG: 150 CAPSULE ORAL at 08:12

## 2021-07-05 RX ADMIN — INSULIN LISPRO 1 UNITS: 100 INJECTION, SOLUTION INTRAVENOUS; SUBCUTANEOUS at 12:11

## 2021-07-05 RX ADMIN — POTASSIUM CHLORIDE 40 MEQ: 1500 TABLET, EXTENDED RELEASE ORAL at 12:18

## 2021-07-05 RX ADMIN — Medication 2000 UNITS: at 08:12

## 2021-07-05 RX ADMIN — BUDESONIDE AND FORMOTEROL FUMARATE DIHYDRATE 2 PUFF: 160; 4.5 AEROSOL RESPIRATORY (INHALATION) at 08:13

## 2021-07-05 RX ADMIN — METOPROLOL TARTRATE 25 MG: 25 TABLET, FILM COATED ORAL at 08:12

## 2021-07-05 RX ADMIN — LEVOTHYROXINE SODIUM 25 MCG: 25 TABLET ORAL at 06:35

## 2021-07-05 RX ADMIN — METOPROLOL TARTRATE 25 MG: 25 TABLET, FILM COATED ORAL at 21:03

## 2021-07-05 RX ADMIN — ACETAMINOPHEN 650 MG: 325 TABLET, FILM COATED ORAL at 21:01

## 2021-07-05 RX ADMIN — BUDESONIDE AND FORMOTEROL FUMARATE DIHYDRATE 2 PUFF: 160; 4.5 AEROSOL RESPIRATORY (INHALATION) at 17:09

## 2021-07-05 RX ADMIN — DABIGATRAN ETEXILATE MESYLATE 150 MG: 150 CAPSULE ORAL at 17:09

## 2021-07-05 RX ADMIN — CYANOCOBALAMIN TAB 500 MCG 1000 MCG: 500 TAB at 08:12

## 2021-07-05 RX ADMIN — PANTOPRAZOLE SODIUM 20 MG: 20 TABLET, DELAYED RELEASE ORAL at 06:35

## 2021-07-05 NOTE — PLAN OF CARE
Problem: PHYSICAL THERAPY ADULT  Goal: Performs mobility at highest level of function for planned discharge setting  See evaluation for individualized goals  Description: Treatment/Interventions: LE strengthening/ROM, Therapeutic exercise, Cognitive reorientation, Endurance training, Patient/family training, Bed mobility (PT to see when transfer training is appropriate)          See flowsheet documentation for full assessment, interventions and recommendations  Outcome: Progressing  Note: Prognosis: Guarded  Problem List: Decreased strength, Decreased range of motion, Decreased endurance, Impaired balance, Decreased mobility, Decreased safety awareness, Obesity, Pain  Assessment: Therapist introduced participation in LE exercises to address physical deficits noted during eval  pt had good understanding of exercise technique after initial introduction and demonstration  Rest breaks were needed due to fatigue  Handout was provided to improve comprehension of technique  Pt would benefit from continued PT to maximize level of functional mobility  PT Discharge Recommendation: Return to facility with rehabilitation services          See flowsheet documentation for full assessment  I have personally seen and examined this patient.  I have fully participated in the care of this patient. I have reviewed all pertinent clinical information, including history, physical exam, plan and the Resident’s note and agree except as noted.

## 2021-07-05 NOTE — ASSESSMENT & PLAN NOTE
Wt Readings from Last 3 Encounters:   07/04/21 (!) 225 kg (496 lb 7 6 oz)   07/03/21 (!) 239 kg (526 lb)   05/16/21 (!) 239 kg (526 lb 14 4 oz)     · Secondary to volume overload  · CVR: pulmonary vascular congestion, volume overload  · Patient reported that he feels he is not urinating nearly as much as he was previously with his Lasix b i d  Regimen  · Poor response to Lasix 40 IV on arrival  Was given 80 mg with brisk response  · Continue lasix IV 80 mg daily as needed for net -1 L  · Monitor renal indices and electrolytes closely  · Replete electrolytes for goal K > 4, Mag > 2  · Holding home p o  Lasix for now, will diurese as needed  · Midsternal chest pain, reproducible upon palpation  · No evidence of ischemia on EKG  · Continue telemetry  · Troponin negative x2  · proBNP 1440 on admission  · Had recent echo 05/2021 EF 60%, noted as technically difficult study secondary to patient's body habitus  · Daily weights  · 2D echo obtained, final read pending

## 2021-07-05 NOTE — PROGRESS NOTES
[facility]  Progress Note - Mila Ingram 1970, 49 y o  male MRN: [id number]  Unit/Bed#: ICU 01 Encounter: [id number]  Primary Care Provider: Addison Zazueta MD   Date and time admitted to hospital: 7/4/2021  4:06 AM    * Acute on chronic respiratory failure with hypoxia and hypercapnia ([address] )  Assessment & Plan  · Likely secondary to acute on chronic diastolic heart failure, volume overload  · Patient states that he is not always compliant with CPAP at night, sometimes I forget"  · Was started on BiPAP at [facility] secondary to tachypnea and subjective dyspnea, will continue q h s  And p r n  · Patient noted to have elevated pCO2 on ABG, however pH is compensated  Serum CO2 elevated at baseline  · Patient had previous sleep study prior to weight loss surgery in 2018 with BiPAP pressures 22/12  · Continue BiPAP qhs and prn 24/12  · HFNC while awake 40% 45L  · Titrate FiO2 for SpO2 greater than 90%  · Patient presents with positive cough and stated yellow sputum production, subjective chills  Denies fever  No leukocytosis on CBC  Currently afebrile  No infiltrate noted on CXR  Procalcitonin negative  · Monitor off antibiotics for now  · Of note patient has history of MRSA positive sputum  · PT/OT   · Consider therapeutic bed to facilitate increased mobility and pulmonary hygiene    Acute on chronic diastolic congestive heart failure [facility])  Assessment & Plan  Wt Readings from Last 3 Encounters:   07/04/21 (!) 225 kg (496 lb 7 6 oz)   07/03/21 (!) 239 kg (526 lb)   05/16/21 (!) 239 kg (526 lb 14 4 oz)     · Secondary to volume overload  · CVR: pulmonary vascular congestion, volume overload  · Patient reported that he feels he is not urinating nearly as much as he was previously with his Lasix b i d  Regimen  · Poor response to Lasix 40 IV on arrival  Was given 80 mg with brisk response  · Continue lasix IV 80 mg daily as needed for net -1 L    · Monitor renal indices and electrolytes closely  · Replete electrolytes for goal K > 4, Mag > 2  · Holding home p o  Lasix for now, will diurese as needed  · Midsternal chest pain, reproducible upon palpation  · No evidence of ischemia on EKG  · Continue telemetry  · Troponin negative x2  · proBNP 1440 on admission  · Had recent echo 05/2021 EF 60%, noted as technically difficult study secondary to patient's body habitus  · Daily weights  · 2D echo obtained, final read pending  Atrial fibrillation ([facility] )  Assessment & Plan  · AFib at baseline  · Patient is typically rate controlled at home with Lopressor  · Received Cardizem in ER for AFib with RVR  · Rates 90-110s, with blood pressures 100-120/50-60s  · Continue home Lopressor p o  25 mg q 12 hours  · Patient anticoagulated on Pradaxa, will continue  · Of note he also has 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    COPD (chronic obstructive pulmonary disease) ([facility] )  Assessment & Plan  · Without acute exacerbation  · Patient with complaints of shortness of breath, yellow sputum production  Denies fevers  Denies increased albuterol nebulizer  · procalcitonin negative, antibiotics discontinued  · Diminished breath sounds noted bilaterally, no evidence of wheezing  · Continue Symbicort  · Continue nebulizers as needed with Xopenex/Atrovent for shortness of breath/wheeze  · Given no wheezing noted will hold off on systemic steroids  · Serum CO2 is chronically elevated  · O2 as above    Diabetes mellitus [facility])  Assessment & Plan  Lab Results   Component Value Date    HGBA1C 5 3 06/30/2021       Recent Labs     07/04/21  0622 07/04/21  1131 07/04/21  1804 07/05/21  0039   POCGLU 131 129 157* 108       Blood Sugar Average: Last 72 hrs:  (P) 131 25   · Holding patient's home Lantus, reportedly not taking  Hgb A1c this admission 5 3%  · Accu-Cheks ACHS with SSI    Hypothyroidism  Assessment & Plan  · Continue home levothyroxine    · Last TSH 5/2021 2 1    Obesity hypoventilation syndrome [facility])  Assessment & Plan  · Continue BiPAP 24/12 qhs and as needed    Morbid obesity ([address] )  Assessment & Plan  · History of gastric bypass surgery 2018  · BMI on admission 70 23 kg/m2  · Nutrition consult  · Patient restarted on bariatric surgery diet  · Continue home PPI         ----------------------------------------------------------------------------------------  HPI/24hr events:   · Weaned from BiPAP during day yesterday to HFNC at 40% 45L  · Diuresis with increased Lasix dose  · No acute events overnight  Disposition: Continue Stepdown Level 1 level of care   Code Status: Level 1 - Full Code  ---------------------------------------------------------------------------------------  SUBJECTIVE  Slept well with BiPAP  Review of Systems   Constitutional: Negative for chills, fatigue and fever  HENT: Negative  Eyes: Negative  Respiratory: Positive for cough  Negative for chest tightness and shortness of breath  Cardiovascular: Positive for leg swelling  Negative for chest pain and palpitations  Gastrointestinal: Negative for abdominal distention, diarrhea, nausea and vomiting  Endocrine: Negative  Genitourinary: Negative for difficulty urinating, frequency and urgency  Musculoskeletal: Negative for arthralgias and myalgias  Skin: Negative for pallor and rash  Allergic/Immunologic: Negative  Neurological: Negative for dizziness, weakness and light-headedness  Hematological: Negative  Psychiatric/Behavioral: Negative        Review of systems was reviewed and negative unless stated above in HPI/24-hour events   ---------------------------------------------------------------------------------------  OBJECTIVE    Vitals   Vitals:    07/04/21 2230 07/04/21 2245 07/04/21 2334 07/05/21 0041   BP: 101/53 98/54  100/65   BP Location: Left arm Left arm  Left arm   Pulse: 96 91  94   Resp: (!) 37 (!) 34  (!) 33   Temp:    99 7 °F (37 6 °C)   TempSrc:    Axillary   SpO2: 97% 97% 99% 99% Weight:       Height:         Temp (24hrs), Av 3 °F (36 8 °C), Min:97 6 °F (36 4 °C), Max:99 7 °F (37 6 °C)  Current: Temperature: 99 7 °F (37 6 °C)          Respiratory:  SpO2: SpO2: 99 %, SpO2 Device: O2 Device: BiPAP  Nasal Cannula O2 Flow Rate (L/min): 3 L/min    Invasive/non-invasive ventilation settings   Respiratory    Lab Data (Last 4 hours)    None         O2/Vent Data (Last 4 hours)       2334          Non-Invasive Ventilation Mode BiPAP                   Physical Exam  Constitutional:       General: He is not in acute distress  Appearance: He is obese  He is ill-appearing  He is not toxic-appearing  HENT:      Head: Normocephalic and atraumatic  Right Ear: External ear normal       Left Ear: External ear normal       Nose: Nose normal  No congestion or rhinorrhea  Mouth/Throat:      Mouth: Mucous membranes are moist       Pharynx: Oropharynx is clear  No oropharyngeal exudate or posterior oropharyngeal erythema  Eyes:      General: No scleral icterus  Extraocular Movements: Extraocular movements intact  Conjunctiva/sclera: Conjunctivae normal       Pupils: Pupils are equal, round, and reactive to light  Neck:      Vascular: No carotid bruit  Cardiovascular:      Rate and Rhythm: Normal rate  Rhythm irregular  Pulses: Normal pulses  Radial pulses are 2+ on the right side and 2+ on the left side  Dorsalis pedis pulses are 2+ on the right side and 2+ on the left side  Heart sounds: S1 normal and S2 normal  Murmur heard  No friction rub  No gallop  Pulmonary:      Effort: Pulmonary effort is normal  No respiratory distress  Breath sounds: Normal breath sounds  No wheezing, rhonchi or rales  Abdominal:      General: Abdomen is flat  Bowel sounds are normal  There is no distension  Palpations: Abdomen is soft  Tenderness: There is no abdominal tenderness  Musculoskeletal:         General: No swelling or tenderness  Normal range of motion  Cervical back: Normal range of motion and neck supple  Right lower le+ Pitting Edema present  Left lower le+ Pitting Edema present  Lymphadenopathy:      Cervical: No cervical adenopathy  Skin:     General: Skin is warm and dry  Coloration: Skin is not pale  Findings: No bruising, erythema or rash  Neurological:      General: No focal deficit present  Mental Status: He is alert and oriented to person, place, and time  Mental status is at baseline  Cranial Nerves: No cranial nerve deficit  Sensory: No sensory deficit  Psychiatric:         Mood and Affect: Mood normal          Behavior: Behavior normal          Thought Content:  Thought content normal          Judgment: Judgment normal          Laboratory and Diagnostics:  Results from last 7 days   Lab Units 21  0521   WBC Thousand/uL 5 95 7 45   HEMOGLOBIN g/dL 12 3 12 7   HEMATOCRIT % 40 2 39 8   PLATELETS Thousands/uL 135* 161   NEUTROS PCT % 76* 75   MONOS PCT % 7 7     Results from last 7 days   Lab Units 21  1426 21   SODIUM mmol/L 145 145 141   POTASSIUM mmol/L 3 6 3 4* 3 7   CHLORIDE mmol/L 99* 100 92*   CO2 mmol/L >45* 45* 45*   ANION GAP mmol/L  --  0* 4   BUN mg/dL 11 13 15   CREATININE mg/dL 0 88 0 95 0 91   CALCIUM mg/dL 9 0 9 0 9 3   GLUCOSE RANDOM mg/dL 107 146* 120   ALT U/L  --   --  11   AST U/L  --   --  10*   ALK PHOS U/L  --   --  60 1   ALBUMIN g/dL  --   --  3 8   TOTAL BILIRUBIN mg/dL  --   --  0 71     Results from last 7 days   Lab Units 21  1426 21   MAGNESIUM mg/dL 1 9 1 7 1 9   PHOSPHORUS mg/dL  --  3 4  --       Results from last 7 days   Lab Units 21  211   INR  0 96   PTT seconds 27      Results from last 7 days   Lab Units 21  1128 21  0804 21  0521   TROPONIN I ng/mL <0 02 <0 02 <0 02 <0 03     Results from last 7 days   Lab Units 07/03/21  2117   LACTIC ACID mmol/L 1 5     ABG:  Results from last 7 days   Lab Units 07/03/21  2241   PH ART  7 355   PCO2 ART mm Hg 82 5*   PO2 ART mm Hg 96 8   HCO3 ART mmol/L 45 0*   BASE EXC ART mmol/L 15 6   ABG SOURCE  Radial, Right     VBG:  Results from last 7 days   Lab Units 07/04/21  1426 07/03/21  2241   PH TRACI  7 364  --    PCO2 TRACI mm Hg 92 9*  --    PO2 TRACI mm Hg 53 3*  --    HCO3 TRACI mmol/L 51 8*  --    BASE EXC TRACI mmol/L 21 1  --    ABG SOURCE   --  Radial, Right     Results from last 7 days   Lab Units 07/04/21  0519 07/03/21  2114   PROCALCITONIN ng/ml <0 05 <0 05       Micro  Results from last 7 days   Lab Units 07/04/21  0519 07/04/21  0500 07/03/21 2116   BLOOD CULTURE   --  Received in Microbiology Lab  Culture in Progress  Received in Microbiology Lab  Culture in Progress  Received in Microbiology Lab  Culture in Progress  Received in Microbiology Lab  Culture in Progress  LEGIONELLA URINARY ANTIGEN  Negative  --   --    STREP PNEUMONIAE ANTIGEN, URINE  Negative  --   --        EKG: atrial fibrillation on telemetry  Imaging: I have personally reviewed pertinent reports  and I have personally reviewed pertinent films in PACS    Intake and Output  I/O       07/03 0701 - 07/04 0700 07/04 0701 - 07/05 0700    P  O   590    I V  (mL/kg)  60 (0 3)    IV Piggyback  250    Total Intake(mL/kg)  900 (4)    Urine (mL/kg/hr) 300 3025 (0 6)    Total Output 300 3025    Net -300 -2125                Height and Weights   Height: 5' 10 51" (179 1 cm)  IBW (Ideal Body Weight): 74 18 kg  Body mass index is 70 21 kg/m²    Weight (last 2 days)     Date/Time   Weight    07/04/21 0600   (!) 225 (496 48)    07/04/21 0408   (!) 225 (496 48)                Nutrition       Diet Orders   (From admission, onward)             Start     Ordered    07/04/21 1608  Diet Bariatric; Bariatric Maintenance  Diet effective now     Question Answer Comment   Diet Type Bariatric    Bariatric Bariatric Maintenance    RD to adjust diet per protocol?  Yes        07/04/21 1608    07/04/21 0802  Room Service  Once     Question:  Type of Service  Answer:  Room Service- Not Appropriate    07/04/21 0801                  Active Medications  Scheduled Meds:  Current Facility-Administered Medications   Medication Dose Route Frequency Provider Last Rate    acetaminophen  650 mg Oral Q6H PRN Rocio Labor, CRNP      budesonide-formoterol  2 puff Inhalation BID Rocio Labor, CRNP      buPROPion  100 mg Oral Daily Rocio Labor, [address]      calcium carbonate  500 mg Oral Daily PRN Wili Turner PA-C      cholecalciferol  2,000 Units Oral Daily Rocio Labor, CRNP      cyanocobalamin  1,000 mcg Oral Daily Rocio Labor, [address]      dabigatran etexilate  150 mg Oral BID Rocio Labor, CRNP      insulin lispro  1-5 Units Subcutaneous HS Rocio Labor, CRNP      insulin lispro  1-6 Units Subcutaneous TID AC Rocio Labor, CRNP      levalbuterol  1 25 mg Nebulization Q6H PRN Rocio Labor, CRNP      And    ipratropium  0 5 mg Nebulization Q6H PRN Rocio Labor, CRNP      levothyroxine  25 mcg Oral Early Morning Rocio Labor, CRNP      metoprolol tartrate  25 mg Oral Q12H [address] Rocio Labor, CRNP      pantoprazole  20 mg Oral Early Morning Rocio Labor, CRNP       Continuous Infusions:     PRN Meds:   acetaminophen, 650 mg, Q6H PRN  calcium carbonate, 500 mg, Daily PRN  levalbuterol, 1 25 mg, Q6H PRN   And  ipratropium, 0 5 mg, Q6H PRN        Invasive Devices Review  Invasive Devices     Peripheral Intravenous Line            Peripheral IV 07/03/21 Right Antecubital 1 day                Rationale for remaining devices:   7/3 PIV: IV medication access  ---------------------------------------------------------------------------------------  Advance Directive and Living Will:      Power of :    POLST:    ---------------------------------------------------------------------------------------  Care Time Delivered:   Upon my evaluation, this patient had a high probability of imminent or life-threatening deterioration due to Acute chronic hypoxic and hypercarbic respiratory failure, acute on chronic diastolic heart failure,, which required my direct attention, intervention, and personal management  I have personally provided 32 minutes ([phone]) of critical care time, exclusive of procedures, teaching, family meetings, and any prior time recorded by providers other than myself  RADHA Bo      Portions of the record may have been created with voice recognition software  Occasional wrong word or "sound a like" substitutions may have occurred due to the inherent limitations of voice recognition software    Read the chart carefully and recognize, using context, where substitutions have occurred

## 2021-07-05 NOTE — PHYSICAL THERAPY NOTE
PHYSICAL THERAPY EVALUATION NOTE    Patient Name: Lakshmi Judge  BYMMH'D Date: 7/5/2021  AGE:   49 y o  Mrn:   [id number]  ADMIT DX:  Shortness of breath [R06 02]    Past Medical History:   Diagnosis Date    Afib ([facility] )     Anemia     Anxiety     Cancer ([place] [id number] )     Cardiac disease     Cellulitis     COPD (chronic obstructive pulmonary disease) ([place] [address] )     Depression     Diabetes mellitus ([place] [address] )     [facility]) [facility]     GERD (gastroesophageal reflux disease)     HBP (high blood pressure)     Heart failure (HCC)     Hx of blood clots     Hypertension     Hypokalemia     Hypothyroid     Obesity     Psychiatric disorder      Length Of Stay: 1  PHYSICAL THERAPY EVALUATION :    07/05/21 1306   PT Last Visit   PT Visit Date 07/05/21   Pain Assessment   Pain Assessment Tool 0-10   Pain Score 4   Pain Location/Orientation Location: Abdomen   Home Living   Type of Home SNF  ([facility])   Additional Comments pt is resident at [facility]  report needing setup assist w/ ADLs and requiring assist w/ IADLs  mobilizes out of bed to wheelchair w/ mechanical lift  pt was previously able to stand for 30 seconds in parallel bars w/ PT  pt has not been out of bed for 3 weeks  pt states being unable to  fully upright position due to abdominal hernia (which is pending surgical repair)  Prior Function   Comments pt seen supine in bed  agreed to PT eval  reports feeling mildly short of breath and having abdominal pain  pt needed occasional input for task focus  Restrictions/Precautions   Other Precautions Multiple lines;Telemetry;O2;Fall Risk;Pain  (high flow nasal cannula)   General   Additional Pertinent History high flow nasal cannula  resting pulse ox 95% w/ heart rate in 90 to 110s, active 91% and heart rate in 110s to 120s     Family/Caregiver Present No   Cognition   Arousal/Participation Cooperative   Orientation Level Oriented to person; Other (Comment)  (pt was identified w/ full name, birth date)   Following Commands Follows one step commands with increased time or repetition   Comments pt's body habitus in currently 36 inches at widest point   RUE Assessment   RUE Assessment WFL  (4-/5, shoulder 3+/5)   LUE Assessment   LUE Assessment WFL  (4-/5, shoulder 3+/5)   RLE Assessment   RLE Assessment X  (3-/5, hip flexion and knee flexion 2+/5)   RLE Overall AROM   R Knee Extension -30* from full extension   LLE Assessment   LLE Assessment X  (3-/5, hip flexion and knee flexion 2+/5)   LLE Overall AROM   L Knee Extension -20* from full extension   Light Touch   RLE Light Touch Grossly intact   LLE Light Touch Grossly intact   Bed Mobility   Rolling R 3  Moderate assistance   Additional items Assist x 1;Bedrails; Increased time required;Verbal cues;LE management  (for bedrail use, LE positioning)   Rolling L 3  Moderate assistance   Additional items Assist x 1;Bedrails; Increased time required;Verbal cues;LE management  (for bedrail use, LE positioning)   Supine to Sit 2  Maximal assistance  (supine to long sitting)   Additional items Assist x 1;HOB elevated; Bedrails; Increased time required;Verbal cues  (for bedrail use, breathing technique)   Sit to Supine 3  Moderate assistance  (long sitting to supine)   Additional items Assist x 1;HOB elevated; Bedrails; Increased time required;Verbal cues  (for bedrail use, breathing technique)   Additional Comments Therapist elevated head of bed to highest position  Pt grasped lower bed rails w/ UEs  pt mobilized upper body forward into long sitting position  pt tolerated 5 seconds x2 w/ modx1  rest break x 3 minutes was needed  additional long sitting not possible due to fatigue and abdominal pain  Pt was unable to mobilize to edge of bed  Pt needs dependent means for out of bed mobilization     Transfers   Sit to Stand Unable to assess   Balance   Static Sitting Poor Static Standing Zero   Activity Tolerance   Activity Tolerance Patient limited by fatigue;Patient limited by pain   Nurse Made Aware spoke to Wilfred Arnett AP   Assessment   Prognosis Guarded   Problem List Decreased strength;Decreased range of motion;Decreased endurance; Impaired balance;Decreased mobility; Decreased safety awareness; Obesity;Pain   Assessment Pt presented early this evening to [facility] ER with complaint of increased shortness of breath, and intermittent yellow sputum production  Dx: acute on chronic hypoxic and hypercapnic respiratory failure, acute on chronic diastolic CHF, a-fib, COPD, DM, and morbid obesity  order placed for PT eval and tx, w/ activity order of up w/ A  pt presents w/ comorbidities of heart failure, a-fib, COPD, morbid obesity, anemia, cellulitis, DVT, HTN, and DM and personal factors of mobilizing w/ assistive device, anxiety, depression and inability to perform IADLs  pt presents w/ pain, weakness, decreased ROM, decreased endurance, impaired balance, decreased safety awareness and fall risk  these impairments are evident in findings from physical examination (weakness and decreased ROM), mobility assessment (need for mod to max assist w/ bed mobility, inability to mobilize out of bed, need for input for mobility and breathing technique), and Barthel Index: 40/100  pt needed input for task focus and mobility technique/safety  pt is at risk for falls due to physical and safety awareness deficits  pt's clinical presentation is unstable/unpredictable (evident in tachycardia, need for significant assist w/ bed mobility and inability to mobilize out of bed, pain impacting overall mobility status, need for increased level of supplemental oxygen in order to maintain oxygen saturation, morbid obesity affecting functional status, and need for input for mobility technique/safety)  pt needs inpatient PT tx to improve mobility deficits and progress mobility training as appropriate  discharge recommendation is for PT upon return to SNF to reduce caregiver burden and maximize level of functional independence  Goals   Patient Goals I want to stand and walk out of here  STG Expiration Date 07/19/21   Short Term Goal #1 pt will: Increase bilateral LE strength 1/2 grade to facilitate independent mobility, Perform rolling and repositioning in bed w/ supervision to decrease caregiver burden, Perform supine <---> long sitting/sitting edge of bed transition w/ minx1 to improve independence, Increase static sitting balance 1 grade to decrease risk for falls, Complete exercise program independently to improve strength and endurance, Tolerate seated at EOB 20 minutes w/ minx1 to facilitate functional task performance and Improve Barthel Index score to 60 or greater to facilitate independence  PT to see when transfer training is appropriate  Plan   Treatment/Interventions LE strengthening/ROM; Therapeutic exercise;Cognitive reorientation; Endurance training;Patient/family training;Bed mobility  (PT to see when transfer training is appropriate)   PT Frequency 2-3x/wk   Recommendation   PT Discharge Recommendation Return to facility with rehabilitation services   Additional Comments pt is Essence's Egress Test FAIL and needs dependent means for out of bed mobilization per hospital policy  Additional Comments 2 pt's body habitus girth width is 36 inches  bariatric bedside chairs and bariatric wheelchairs are 27 inches  Sizewise shuttle chair B also does not have adequate width to accomodate the pt  previously pts with the same issue had been seated on the couch in the pt's room, but pt is unable to tolerate a seated position above 40 to 45* related to pain from hernia     AM-PAC Basic Mobility Inpatient   Turning in Bed Without Bedrails 2   Lying on Back to Sitting on Edge of Flat Bed 1   Moving Bed to Chair 1   Standing Up From Chair 1   Walk in Room 1   Climb 3-5 Stairs 1   Basic Mobility Inpatient Raw Score 7   Turning Head Towards Sound 4   Follow Simple Instructions 3   Low Function Basic Mobility Raw Score 14   Low Function Basic Mobility Standardized Score 22 01   Barthel Index   Feeding 10   Bathing 0   Grooming Score 5   Dressing Score 5   Bladder Score 10   Bowels Score 10   Toilet Use Score 0   Transfers (Bed/Chair) Score 0   Mobility (Level Surface) Score 0   Stairs Score 0   Barthel Index Score 40     The patient's AM-PAC Basic Mobility Inpatient Short Form Low Function Raw Score 14 , Standardized Score is 22 01  A standardized score less 42 9 suggests the patient may benefit from discharge to post-acute rehab services  Please also refer to the recommendation of the Physical Therapist for safe discharge planning  Skilled PT recommended while in hospital and upon DC to progress pt toward treatment goals       Caroline Jose, PT

## 2021-07-05 NOTE — UTILIZATION REVIEW
Initial Clinical Review    Admission: Date/Time/Statement:   Admission Orders (From admission, onward)     Ordered        07/04/21 0412  Inpatient Admission  Once                   Orders Placed This Encounter   Procedures    Inpatient Admission     Standing Status:   Standing     Number of Occurrences:   1     Order Specific Question:   Level of Care     Answer:   Level 1 Stepdown [13]     Order Specific Question:   Estimated length of stay     Answer:   More than 2 Midnights     Order Specific Question:   Certification     Answer:   I certify that inpatient services are medically necessary for this patient for a duration of greater than two midnights  See H&P and MD Progress Notes for additional information about the patient's course of treatment  ED Arrival Information     Patient not seen in ED                     No chief complaint on file  Initial Presentation: 49 y o  male  with past medical history significant for diastolic heart failure, chronic hypoxic and hypercapnic respiratory failure, A fib on anticoagulation, COPD, morbid obesity, diabetes mellitus, hypothyroidism, MRSA sputum  Patient is a resident of [address]  He presented early this evening to [facility] ER with complaint of increased shortness of breath, and intermittent yellow sputum production  He was initially placed on nasal cannula with SpO2 high 90s  CXR positive for volume overload was given Lasix IV 40 mg x 1 with approx 300 ml UOP, and started on BiPAP for increased work of breathing  He was also in AFib with RVR on presentation to the emergency department  He was treated with Cardizem with improvement in rate control  Pt was was transferred to [facility] for higher level of care/ step-down level 1 admission under critical care medicine om  pap  On exam at [facility], pt tachycardic with irreg [id number]'s  tachypneic with decreased breath sounds, +1 pitting edema BLE with PVD discoloration LLE  Midsternal chest pain reproducible upon plapation, no evidence of ischemia on ECG  Pt admitted as Inpatient with acute on chronic resp failure, with hypoxia and hypercapnia-likely secondary to acute on chronic diastolic heart failure, volume overload, possible tracheobronchitis versus COPD exacerbation and with A fib   Plan- continue Bi Pap at [facility] and prn- currently tolerating 18/5 in 40%, titrate O2 to keep sat >90% ,check procal and start abx if elevated, MRSA sputum, repeat lasix 40 mg IV x1 , telemetry, check troponin, pro BNP, daily wts  Lopressor IV 5 mg x1 for tachycardia, continue home lopressor, azithromycin  mg Q 24 hours x3 days, Continue Symbicort, Start nebulizers as needed with Xopenex/Atrovent         Date: 7/5   Day 2:   Pt weaned from Bi Pap yesterday to HFNC O2-  Pt with +2 BLE edema, lungs w/o abnormal sounds  Pt continues on Bi Pap q HS and prn - 24/12 - with HFNC while awake at 40 %45 L  Procal neg   Abx d/c'ed  Pt with chronically elevated Co2 Pt had poor response to Lasix 40 IV on arrival to [facility], was ordered 80 mg IV 7/4 pm  with brisk response  Net I/o = -1 L  Plan to continue lasix 80 mg Iv daily with monitoring renal indices and lytes, re[plete lytes for goal K > 4, Mag > 2    Troponin neg x2, pro CXE=3430  2D echo ordered    Herat rates 90's-110's with BP's 100-120/50-60s    Initial Vitals   Temperature Pulse Respirations Blood Pressure SpO2   07/04/21 0408 07/04/21 0408 07/04/21 0408 07/04/21 0408 07/04/21 0407   97 8 °F (36 6 °C) (!) 118 (!) 32 137/61 91 %      Temp Source Heart Rate Source Patient Position - Orthostatic VS BP Location FiO2 (%)   07/04/21 0408 07/04/21 0408 07/04/21 0408 07/04/21 0408 07/04/21 0500   Oral Monitor Lying Left arm 40      Pain Score       07/04/21 0408       4          Wt Readings from Last 1 Encounters:   07/04/21 (!) 225 kg (496 lb 7 6 oz)     Additional Vital Signs:   Date/Time  Temp  Pulse  Resp  BP  MAP (mmHg)  SpO2  FiO2 (%)  Calculated FIO2 (%) - Nasal Cannula  O2 Flow Rate (L/min)  Nasal Cannula O2 Flow Rate (L/min)  O2 Device  O2 Interface Device    07/05/21 1053  97 9 °F (36 6 °C)  93  16  142/61  79  97 %  --  --  --  --  High flow nasal cannula  --    07/05/21 0812  --  103  --  120/67  --  --  --  --  --  --  --  --    07/05/21 0747  --  --  --  --  --  98 %  35  --  45 L/min  --  High flow nasal cannula  HFNC prongs    07/05/21 0651  98 7 °F (37 1 °C)  100  28Abnormal   108/63  76  98 %  --  --  --  --  High flow nasal cannula  --    07/05/21 0333  --  --  --  --  --  98 %  40  --  --  --  BiPAP  Full face mask    07/05/21 0041  99 7 °F (37 6 °C)  94  33Abnormal   100/65  76  99 %  --  --  --  --  --  --    07/04/21 2334  --  --  --  --  --  99 %  40  --  --  --  BiPAP  Full face mask    07/04/21 2330  --  --  --  --  --  --  --  --  --  --  BiPAP  --    07/04/21 2245  --  91  34Abnormal   98/54  73  97 %  --  --  --  --  --  --    07/04/21 2230  --  96  37Abnormal   101/53  73  97 %  --  --  --  --  --  --    07/04/21 2228  97 6 °F (36 4 °C)  104  27Abnormal   101/53  73  97 %  --  --  --  --  --  --    07/04/21 2200  --  103  39Abnormal   111/67  83  98 %  --  --  --  --  High flow nasal cannula  --    07/04/21 2154  --  106Abnormal   --  111/67  --  --  --  --  --  --  --  --    07/04/21 2100  --  104  33Abnormal   109/63  76  98 %  --  --  --  --  High flow nasal cannula  --    07/04/21 1930  --  --  --  --  --  97 %  40  --  --  --  High flow nasal cannula  HFNC prongs    07/04/21 1845  --  104  35Abnormal   107/56  73  99 %  --  --  --  --  --  --    07/04/21 1832  98 1 °F (36 7 °C)  108Abnormal   25Abnormal   123/66  81  98 %  --  --  --  --  --  --    07/04/21 1621  --  --  --  --  --  100 %  --  --  --  --  --  HFNC prongs    07/04/21 1517  98 6 °F (37 °C)  --  --  --  --  --  --  --  --  --  --  --    07/04/21 1127  --  --  --  --  --  96 %  --  --  --  --  --  Full face mask    07/04/21 1116  98 4 °F (36 9 °C)  107Abnormal   30Abnormal 111/63  81  95 %  --  --  --  --  BiPAP  --    07/04/21 0933  --  105  --  121/67  --  --  --  --  --  --  --  --    07/04/21 0817  --  --  --  --  --  98 %  --  --  --  --  --  Full face mask    07/04/21 0800  97 6 °F (36 4 °C)  --  --  --  --  --  --  --  --  --  --  --    07/04/21 0645  --  111Abnormal   33Abnormal   131/58  82  95 %  --  --  --  --  BiPAP  --    07/04/21 0500  --  --  --  --  --  --  40  --  --  --  BiPAP  --        Pertinent Labs/Diagnostic Test Results    Results from last 7 days   Lab Units 07/03/21 2116   SARS-COV-2  Negative     Results from last 7 days   Lab Units 07/05/21  0505 07/04/21  0519 07/03/21  2114   WBC Thousand/uL 6 03 5 95 7 45   HEMOGLOBIN g/dL 12 1 12 3 12 7   HEMATOCRIT % 37 9 40 2 39 8   PLATELETS Thousands/uL 117* 135* 161   NEUTROS ABS Thousands/µL 4 38 4 48 5 53         Results from last 7 days   Lab Units 07/05/21  0635 07/04/21  1426 07/04/21  0519 07/03/21  2114   SODIUM mmol/L 143 145 145 141   POTASSIUM mmol/L 3 4* 3 6 3 4* 3 7   CHLORIDE mmol/L 101 99* 100 92*   CO2 mmol/L 45* >45* 45* 45*   ANION GAP mmol/L -3*  --  0* 4   BUN mg/dL 12 11 13 15   CREATININE mg/dL 0 92 0 88 0 95 0 91   EGFR ml/min/1 73sq m 97 100 93 98   CALCIUM mg/dL 8 8 9 0 9 0 9 3   MAGNESIUM mg/dL 2 0 1 9 1 7 1 9   PHOSPHORUS mg/dL  --   --  3 4  --      Results from last 7 days   Lab Units 07/05/21  0635 07/03/21  2114   AST U/L 13 10*   ALT U/L 15 11   ALK PHOS U/L 67 60 1   TOTAL PROTEIN g/dL 6 5 7 0   ALBUMIN g/dL 2 9* 3 8   TOTAL BILIRUBIN mg/dL 1 06* 0 71     Results from last 7 days   Lab Units 07/05/21  1055 07/05/21  0654 07/05/21  0039 07/04/21  1804 07/04/21  1131 07/04/21  0622   POC GLUCOSE mg/dl 158* 118 108 157* 129 131     Results from last 7 days   Lab Units 07/05/21  0635 07/04/21  1426 07/04/21  0519 07/03/21  2114   GLUCOSE RANDOM mg/dL 117 107 146* 120         Results from last 7 days   Lab Units 06/30/21  0641   HEMOGLOBIN A1C % 5 3   EAG mg/dL 105      Results from last 7 days   Lab Units 07/03/21  2241   PH ART  7 355   PCO2 ART mm Hg 82 5*   PO2 ART mm Hg 96 8   HCO3 ART mmol/L 45 0*   BASE EXC ART mmol/L 15 6   O2 CONTENT ART mL/dL 17 6   O2 HGB, ARTERIAL % 95 1   ABG SOURCE  Radial, Right     Results from last 7 days   Lab Units 07/04/21  1426 07/04/21  1128   PH TRACI  7 364 7 342   PCO2 TRACI mm Hg 92 9* 101 6*   PO2 TRACI mm Hg 53 3* 39 4   HCO3 TRACI mmol/L 51 8* 53 8*   BASE EXC TRACI mmol/L 21 1 22 3   O2 CONTENT TRACI ml/dL 16 0 13 6   O2 HGB, VENOUS % 84 4* 72 1             Results from last 7 days   Lab Units 07/04/21  1128 07/04/21  0804 07/04/21  0519 07/03/21  2114   TROPONIN I ng/mL <0 02 <0 02 <0 02 <0 03         Results from last 7 days   Lab Units 07/03/21  2114   PROTIME seconds 10 9   INR  0 96   PTT seconds 27         Results from last 7 days   Lab Units 07/04/21  0519 07/03/21  2114   PROCALCITONIN ng/ml <0 05 <0 05     Results from last 7 days   Lab Units 07/03/21  2117   LACTIC ACID mmol/L 1 5         Results from last 7 days   Lab Units 07/03/21  2114   DIGOXIN LVL ng/mL <0 5*     Results from last 7 days   Lab Units 07/04/21  0519 07/03/21  2114   NT-PRO BNP pg/mL 1,440*  --    BNP pg/mL  --  122 2*           Results from last 7 days   Lab Units 07/03/21  2209   CLARITY UA  Slightly Cloudy*   COLOR UA  Yellow   SPEC GRAV UA  1 020   PH UA  5 5   GLUCOSE UA mg/dl Negative   KETONES UA mg/dl Negative   BLOOD UA  Negative   PROTEIN UA mg/dl Negative   NITRITE UA  Negative   BILIRUBIN UA  Negative   UROBILINOGEN UA E U /dl 0 2   LEUKOCYTES UA  Negative     Results from last 7 days   Lab Units 07/04/21  0519 07/03/21 2116   STREP PNEUMONIAE ANTIGEN, URINE  Negative  --    LEGIONELLA URINARY ANTIGEN  Negative  --    INFLUENZA A PCR   --  None Detected   INFLUENZA B PCR   --  None Detected   RSV PCR   --  None Detected           Results from last 7 days   Lab Units 07/04/21  0500 07/03/21 2116   BLOOD CULTURE  Received in Microbiology Lab  Culture in Progress    Received in Microbiology Lab  Culture in Progress  Received in Microbiology Lab  Culture in Progress  Received in Microbiology Lab  Culture in Progress  Past Medical History:   Diagnosis Date    Afib ([place] [address] )     Anemia     Anxiety     Cancer ([place] [address] )     Cardiac disease     Cellulitis     COPD (chronic obstructive pulmonary disease) ([place] [address] )     Depression     Diabetes mellitus ([place] [address] )     DVT [facility]deep venous thrombosis) [facility])     GERD (gastroesophageal reflux disease)     HBP (high blood pressure)     Heart failure ([facility])     Hx of blood clots     Hypertension     Hypokalemia     Hypothyroid     Obesity     Psychiatric disorder      Present on Admission:   Hypothyroidism   Obesity hypoventilation syndrome (HCC)   Acute on chronic respiratory failure with hypoxia and hypercapnia (HCC)   Acute on chronic diastolic congestive heart failure (HCC)   Atrial fibrillation ([facility])   COPD (chronic obstructive pulmonary disease) ([facility])   Diabetes mellitus ([facility])   Morbid obesity ([facility])      Admitting Diagnosis: Shortness of breath [R06 02]  Age/Sex: 49 y o  male  Admission Orders:  Scheduled Medications:  budesonide-formoterol, 2 puff, Inhalation, BID  buPROPion, 100 mg, Oral, Daily  cholecalciferol, 2,000 Units, Oral, Daily  cyanocobalamin, 1,000 mcg, Oral, Daily  dabigatran etexilate, 150 mg, Oral, BID  insulin lispro, 1-5 Units, Subcutaneous, HS  insulin lispro, 1-6 Units, Subcutaneous, TID AC  levothyroxine, 25 mcg, Oral, Early Morning  metoprolol tartrate, 25 mg, Oral, Q12H MARY  pantoprazole, 20 mg, Oral, Early Morning  potassium chloride (K-DUR,KLOR-CON) CR tablet 40 mEq   Dose: 40 mEq  Freq: Once Route: PO x1 7/4  metoprolol (LOPRESSOR) injection 5 mg   Dose: 5 mg  Freq: Once Route: IV x1 7/4   furosemide (LASIX) injection 80 mg   Dose: 80 mg  Freq: Once Route: IV x1 7/4 @2125  furosemide (LASIX) injection 40 mg   Dose: 40 mg  Freq:  Once Route: IV x1 7/4 @0717  azithromycin (ZITHROMAX) 500 mg in sodium chloride 0 9 % 250 mL IVPB   Dose: 500 mg  Freq: Every 24 hours Route: IV  Last Dose: Stopped (07/04/21 0740)  Start: 07/04/21 0600 End: 07/04/21 1723    Continuous IV Infusions:     PRN Meds:  acetaminophen, 650 mg, Oral, Q6H PRN x1 7/4  calcium carbonate, 500 mg, Oral, Daily PRN x1 7/4  levalbuterol, 1 25 mg, Nebulization, Q6H PRN   And  ipratropium, 0 5 mg, Nebulization, Q6H PRN    POCT glucose QID   venous access consult  Neuro checks q4h   SCD's   cardiopulmonary monitopring   nursing dysphagia assessment  Bariatric maintenance diet  O2 nc to keep sat at least 90 %  I/O, daily wt    IP CONSULT TO CASE MANAGEMENT    Network Utilization Review Department  ATTENTION: Please call with any questions or concerns to [phone] and carefully listen to the prompts so that you are directed to the right person  All voicemails are confidential   Damaso Pacheco all requests for admission clinical reviews, approved or denied determinations and any other requests to dedicated fax number below belonging to the campus where the patient is receiving treatment   List of dedicated fax numbers for the Facilities:  [address] DENIALS (Administrative/Medical Necessity) [phone]   [address] (Maternity/NICU/Pediatrics) [phone]   [address] Charles Samson [address] [place] [address] [address] [facility] [place] [address] [phone]

## 2021-07-05 NOTE — ASSESSMENT & PLAN NOTE
· Without acute exacerbation  · Patient with complaints of shortness of breath, yellow sputum production  Denies fevers  Denies increased albuterol nebulizer  · procalcitonin negative, antibiotics discontinued    · Diminished breath sounds noted bilaterally, no evidence of wheezing  · Continue Symbicort  · Continue nebulizers as needed with Xopenex/Atrovent for shortness of breath/wheeze  · Given no wheezing noted will hold off on systemic steroids  · Serum CO2 is chronically elevated  · O2 as above

## 2021-07-05 NOTE — PLAN OF CARE
Problem: Potential for Falls  Goal: Patient will remain free of falls  Description: INTERVENTIONS:  - Educate patient/family on patient safety including physical limitations  - Instruct patient to call for assistance with activity   - Consult OT/PT to assist with strengthening/mobility   - Keep Call bell within reach  - Keep bed low and locked with side rails adjusted as appropriate  - Keep care items and personal belongings within reach  - Initiate and maintain comfort rounds  - Make Fall Risk Sign visible to staff  - Offer Toileting every 2 Hours, in advance of need  - Initiate/Maintain bed alarm  - Apply yellow socks and bracelet for high fall risk patients  - Consider moving patient to room near nurses station  Outcome: Progressing     Problem: Prexisting or High Potential for Compromised Skin Integrity  Goal: Skin integrity is maintained or improved  Description: INTERVENTIONS:  - Identify patients at risk for skin breakdown  - Assess and monitor skin integrity  - Assess and monitor nutrition and hydration status  - Monitor labs   - Assess for incontinence   - Turn and reposition patient  - Assist with mobility/ambulation  - Relieve pressure over bony prominences  - Avoid friction and shearing  - Provide appropriate hygiene as needed including keeping skin clean and dry  - Evaluate need for skin moisturizer/barrier cream  - Collaborate with interdisciplinary team   - Patient/family teaching  - Consider wound care consult   Outcome: Progressing     Problem: MOBILITY - ADULT  Goal: Maintain or return to baseline ADL function  Description: INTERVENTIONS:  -  Assess patient's ability to carry out ADLs; assess patient's baseline for ADL function and identify physical deficits which impact ability to perform ADLs (bathing, care of mouth/teeth, toileting, grooming, dressing, etc )  - Assess/evaluate cause of self-care deficits   - Assess range of motion  - Assess patient's mobility; develop plan if impaired  - Assess patient's need for assistive devices and provide as appropriate  - Encourage maximum independence but intervene and supervise when necessary  - Involve family in performance of ADLs  - Assess for home care needs following discharge   - Consider OT consult to assist with ADL evaluation and planning for discharge  - Provide patient education as appropriate  Outcome: Progressing

## 2021-07-05 NOTE — ASSESSMENT & PLAN NOTE
Lab Results   Component Value Date    HGBA1C 5 3 06/30/2021       Recent Labs     07/04/21  0622 07/04/21  1131 07/04/21  1804 07/05/21  0039   POCGLU 131 129 157* 108       Blood Sugar Average: Last 72 hrs:  (P) 131 25   · Holding patient's home Lantus, reportedly not taking   Hgb A1c this admission 5 3%  · Accu-Cheks ACHS with SSI

## 2021-07-05 NOTE — PHYSICAL THERAPY NOTE
PHYSICAL THERAPY TREATMENT NOTE    Patient Name: Leonela Phelan  RCIJF'I Date: 7/5/2021 07/05/21 1316   PT Last Visit   PT Visit Date 07/05/21   Pain Assessment   Pain Assessment Tool 0-10   Pain Score 4   Pain Location/Orientation Location: Abdomen   Restrictions/Precautions   Other Precautions Multiple lines;Telemetry;O2;Fall Risk;Pain  (high flow nasal cannula)   General   Chart Reviewed Yes   Family/Caregiver Present No   Cognition   Arousal/Participation Cooperative   Attention Attends with cues to redirect   Orientation Level Oriented to person; Other (Comment)  (pt was identified w/ full name, birth date)   Following Commands Follows one step commands with increased time or repetition   Subjective   Subjective pt agreed to participate in PT intervention  Activity Tolerance   Activity Tolerance Patient limited by fatigue;Patient limited by pain   Nurse Made Aware spoke to [facility] Wilfred COELLO   Equipment Use   Comments ankle pumps 30  quad sets 20  heel slides 10  Assessment   Prognosis Guarded   Problem List Decreased strength;Decreased range of motion;Decreased endurance; Impaired balance;Decreased mobility; Decreased safety awareness; Obesity;Pain   Assessment Therapist introduced participation in LE exercises to address physical deficits noted during eval  pt had good understanding of exercise technique after initial introduction and demonstration  Rest breaks were needed due to fatigue  Handout was provided to improve comprehension of technique  Pt would benefit from continued PT to maximize level of functional mobility  Goals   Patient Goals I want to stand and walk out of here  STG Expiration Date 07/19/21   Short Term Goal #1 pt will:  Increase bilateral LE strength 1/2 grade to facilitate independent mobility, Perform rolling and repositioning in bed w/ supervision to decrease caregiver burden, Perform supine <---> long sitting/sitting edge of bed transition w/ minx1 to improve independence, Increase static sitting balance 1 grade to decrease risk for falls, Complete exercise program independently to improve strength and endurance, Tolerate seated at EOB 20 minutes w/ minx1 to facilitate functional task performance and Improve Barthel Index score to 60 or greater to facilitate independence  PT to see when transfer training is appropriate  PT Treatment Day 1   Plan   Treatment/Interventions LE strengthening/ROM; Therapeutic exercise;Cognitive reorientation; Endurance training;Patient/family training;Bed mobility  (PT to see when transfer training is appropriate)   Progress Progressing toward goals   PT Frequency 2-3x/wk   Recommendation   PT Discharge Recommendation Return to facility with rehabilitation services   Additional Comments pt is Essence's Egress Test FAIL and needs dependent means for out of bed mobilization per hospital policy  AM-PAC Basic Mobility Inpatient   Turning in Bed Without Bedrails 2   Lying on Back to Sitting on Edge of Flat Bed 1   Moving Bed to Chair 1   Standing Up From Chair 1   Walk in Room 1   Climb 3-5 Stairs 1   Basic Mobility Inpatient Raw Score 7   Turning Head Towards Sound 4   Follow Simple Instructions 3   Low Function Basic Mobility Raw Score 14   Low Function Basic Mobility Standardized Score 22 01     Skilled inpatient PT recommended while in hospital to progress pt toward treatment goals      Render Cheadle, PT

## 2021-07-05 NOTE — ASSESSMENT & PLAN NOTE
· Likely secondary to acute on chronic diastolic heart failure, volume overload  · Patient states that he is not always compliant with CPAP at night, sometimes I forget"  · Was started on [facility]PAP at [facility] secondary to tachypnea and subjective dyspnea, will continue q h s  And p r n  · Patient noted to have elevated pCO2 on ABG, however pH is compensated  Serum CO2 elevated at baseline  · Patient had previous sleep study prior to weight loss surgery in 2018 with BiPAP pressures 22/12  · Continue BiPAP qhs and prn 24/12  · HFNC while awake 40% 45L  · Titrate FiO2 for SpO2 greater than 90%  · Patient presents with positive cough and stated yellow sputum production, subjective chills  Denies fever  No leukocytosis on CBC  Currently afebrile  No infiltrate noted on CXR   Procalcitonin negative  · Monitor off antibiotics for now  · Of note patient has history of MRSA positive sputum  · PT/OT   · Consider therapeutic bed to facilitate increased mobility and pulmonary hygiene

## 2021-07-05 NOTE — ASSESSMENT & PLAN NOTE
· History of gastric bypass surgery 2018  · BMI on admission 70 23 kg/m2  · Nutrition consult    · Patient restarted on bariatric surgery diet  · Continue home PPI

## 2021-07-05 NOTE — ASSESSMENT & PLAN NOTE
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

## 2021-07-06 PROBLEM — Z86.718 HISTORY OF DVT (DEEP VEIN THROMBOSIS): Status: ACTIVE | Noted: 2021-07-06

## 2021-07-06 PROBLEM — R78.81 BACTEREMIA DUE TO GRAM-POSITIVE BACTERIA: Status: ACTIVE | Noted: 2021-07-06

## 2021-07-06 LAB
ANION GAP SERPL CALCULATED.3IONS-SCNC: 3 MMOL/L (ref 4–13)
BASOPHILS # BLD AUTO: 0.02 THOUSANDS/ΜL (ref 0–0.1)
BASOPHILS NFR BLD AUTO: 0 % (ref 0–1)
BUN SERPL-MCNC: 17 MG/DL (ref 5–25)
CALCIUM SERPL-MCNC: 9 MG/DL (ref 8.3–10.1)
CHLORIDE SERPL-SCNC: 100 MMOL/L (ref 100–108)
CO2 SERPL-SCNC: 38 MMOL/L (ref 21–32)
CREAT SERPL-MCNC: 0.86 MG/DL (ref 0.6–1.3)
EOSINOPHIL # BLD AUTO: 0.14 THOUSAND/ΜL (ref 0–0.61)
EOSINOPHIL NFR BLD AUTO: 3 % (ref 0–6)
ERYTHROCYTE [DISTWIDTH] IN BLOOD BY AUTOMATED COUNT: 13.5 % (ref 11.6–15.1)
GFR SERPL CREATININE-BSD FRML MDRD: 101 ML/MIN/1.73SQ M
GLUCOSE SERPL-MCNC: 125 MG/DL (ref 65–140)
GLUCOSE SERPL-MCNC: 125 MG/DL (ref 65–140)
GLUCOSE SERPL-MCNC: 131 MG/DL (ref 65–140)
GLUCOSE SERPL-MCNC: 136 MG/DL (ref 65–140)
GLUCOSE SERPL-MCNC: 137 MG/DL (ref 65–140)
HCT VFR BLD AUTO: 38.8 % (ref 36.5–49.3)
HGB BLD-MCNC: 12.2 G/DL (ref 12–17)
IMM GRANULOCYTES # BLD AUTO: 0.01 THOUSAND/UL (ref 0–0.2)
IMM GRANULOCYTES NFR BLD AUTO: 0 % (ref 0–2)
LYMPHOCYTES # BLD AUTO: 1.31 THOUSANDS/ΜL (ref 0.6–4.47)
LYMPHOCYTES NFR BLD AUTO: 24 % (ref 14–44)
MAGNESIUM SERPL-MCNC: 2.2 MG/DL (ref 1.6–2.6)
MCH RBC QN AUTO: 30.5 PG (ref 26.8–34.3)
MCHC RBC AUTO-ENTMCNC: 31.4 G/DL (ref 31.4–37.4)
MCV RBC AUTO: 97 FL (ref 82–98)
MONOCYTES # BLD AUTO: 0.34 THOUSAND/ΜL (ref 0.17–1.22)
MONOCYTES NFR BLD AUTO: 6 % (ref 4–12)
NEUTROPHILS # BLD AUTO: 3.56 THOUSANDS/ΜL (ref 1.85–7.62)
NEUTS SEG NFR BLD AUTO: 67 % (ref 43–75)
NRBC BLD AUTO-RTO: 0 /100 WBCS
PHOSPHATE SERPL-MCNC: 4.1 MG/DL (ref 2.7–4.5)
PLATELET # BLD AUTO: 162 THOUSANDS/UL (ref 149–390)
PMV BLD AUTO: 10.3 FL (ref 8.9–12.7)
POTASSIUM SERPL-SCNC: 3.7 MMOL/L (ref 3.5–5.3)
RBC # BLD AUTO: 4 MILLION/UL (ref 3.88–5.62)
SODIUM SERPL-SCNC: 141 MMOL/L (ref 136–145)
WBC # BLD AUTO: 5.38 THOUSAND/UL (ref 4.31–10.16)

## 2021-07-06 PROCEDURE — 94760 N-INVAS EAR/PLS OXIMETRY 1: CPT

## 2021-07-06 PROCEDURE — 94660 CPAP INITIATION&MGMT: CPT

## 2021-07-06 PROCEDURE — 84100 ASSAY OF PHOSPHORUS: CPT | Performed by: FAMILY MEDICINE

## 2021-07-06 PROCEDURE — 99232 SBSQ HOSP IP/OBS MODERATE 35: CPT | Performed by: INTERNAL MEDICINE

## 2021-07-06 PROCEDURE — 83735 ASSAY OF MAGNESIUM: CPT | Performed by: FAMILY MEDICINE

## 2021-07-06 PROCEDURE — 85025 COMPLETE CBC W/AUTO DIFF WBC: CPT | Performed by: NURSE PRACTITIONER

## 2021-07-06 PROCEDURE — 80048 BASIC METABOLIC PNL TOTAL CA: CPT | Performed by: FAMILY MEDICINE

## 2021-07-06 PROCEDURE — 82948 REAGENT STRIP/BLOOD GLUCOSE: CPT

## 2021-07-06 RX ORDER — POTASSIUM CHLORIDE 20 MEQ/1
40 TABLET, EXTENDED RELEASE ORAL ONCE
Status: COMPLETED | OUTPATIENT
Start: 2021-07-06 | End: 2021-07-06

## 2021-07-06 RX ORDER — FUROSEMIDE 40 MG/1
40 TABLET ORAL 2 TIMES DAILY
Status: DISCONTINUED | OUTPATIENT
Start: 2021-07-06 | End: 2021-07-08 | Stop reason: HOSPADM

## 2021-07-06 RX ORDER — GABAPENTIN 300 MG/1
300 CAPSULE ORAL 2 TIMES DAILY PRN
Status: DISCONTINUED | OUTPATIENT
Start: 2021-07-06 | End: 2021-07-08 | Stop reason: HOSPADM

## 2021-07-06 RX ORDER — SPIRONOLACTONE 25 MG/1
50 TABLET ORAL DAILY
Status: DISCONTINUED | OUTPATIENT
Start: 2021-07-06 | End: 2021-07-08 | Stop reason: HOSPADM

## 2021-07-06 RX ADMIN — PANTOPRAZOLE SODIUM 20 MG: 20 TABLET, DELAYED RELEASE ORAL at 06:27

## 2021-07-06 RX ADMIN — BUDESONIDE AND FORMOTEROL FUMARATE DIHYDRATE 2 PUFF: 160; 4.5 AEROSOL RESPIRATORY (INHALATION) at 09:11

## 2021-07-06 RX ADMIN — CYANOCOBALAMIN TAB 500 MCG 1000 MCG: 500 TAB at 09:10

## 2021-07-06 RX ADMIN — DABIGATRAN ETEXILATE MESYLATE 150 MG: 150 CAPSULE ORAL at 09:10

## 2021-07-06 RX ADMIN — DABIGATRAN ETEXILATE MESYLATE 150 MG: 150 CAPSULE ORAL at 17:10

## 2021-07-06 RX ADMIN — Medication 2000 UNITS: at 09:10

## 2021-07-06 RX ADMIN — FUROSEMIDE 40 MG: 40 TABLET ORAL at 09:11

## 2021-07-06 RX ADMIN — METOPROLOL TARTRATE 25 MG: 25 TABLET, FILM COATED ORAL at 20:47

## 2021-07-06 RX ADMIN — POTASSIUM CHLORIDE 40 MEQ: 1500 TABLET, EXTENDED RELEASE ORAL at 09:22

## 2021-07-06 RX ADMIN — BUPROPION HYDROCHLORIDE 100 MG: 100 TABLET, FILM COATED ORAL at 09:11

## 2021-07-06 RX ADMIN — CEFEPIME HYDROCHLORIDE 2000 MG: 2 INJECTION, POWDER, FOR SOLUTION INTRAVENOUS at 15:47

## 2021-07-06 RX ADMIN — LEVOTHYROXINE SODIUM 25 MCG: 25 TABLET ORAL at 06:27

## 2021-07-06 RX ADMIN — VANCOMYCIN HYDROCHLORIDE 2000 MG: 1 INJECTION, POWDER, LYOPHILIZED, FOR SOLUTION INTRAVENOUS at 04:40

## 2021-07-06 RX ADMIN — CEFEPIME HYDROCHLORIDE 2000 MG: 2 INJECTION, POWDER, FOR SOLUTION INTRAVENOUS at 03:36

## 2021-07-06 RX ADMIN — SPIRONOLACTONE 50 MG: 25 TABLET, FILM COATED ORAL at 09:10

## 2021-07-06 RX ADMIN — BUDESONIDE AND FORMOTEROL FUMARATE DIHYDRATE 2 PUFF: 160; 4.5 AEROSOL RESPIRATORY (INHALATION) at 17:10

## 2021-07-06 RX ADMIN — VANCOMYCIN HYDROCHLORIDE 2000 MG: 1 INJECTION, POWDER, LYOPHILIZED, FOR SOLUTION INTRAVENOUS at 16:39

## 2021-07-06 RX ADMIN — METOPROLOL TARTRATE 25 MG: 25 TABLET, FILM COATED ORAL at 09:11

## 2021-07-06 NOTE — ASSESSMENT & PLAN NOTE
· Without acute exacerbation  · Diminished breath sounds noted bilaterally, no evidence of wheezing  · Continue Symbicort  · Continue nebulizers as needed with Xopenex/Atrovent for shortness of breath/wheeze  · Serum CO2 chronically elevated  · Other interventions as above

## 2021-07-06 NOTE — ASSESSMENT & PLAN NOTE
· Bcx from 7/3 2/2 bottles + for gram positive cocci in clusters (possible contamination ?) pending speciation   · Repeat bcx from 7/4 reveal no growth x 24 hrs   · Patient + MRSA swab  · Treated with vancomycin & cefepime D#2  · Procal negative   · Unclear source   · Trend Temp & WBC   · Patient does not appear infectious on exam   · cxr Moderate pulmonary edema  · U/a negative   · Abdominal exam is benign   · No chronic lines or catheters

## 2021-07-06 NOTE — PROGRESS NOTES
[facility]  Progress Note - Shana Barton 1970, 49 y o  male MRN: [id number]  Unit/Bed#: ICU 01 Encounter: [id number]  Primary Care Provider: Lu Victor MD   Date and time admitted to hospital: 7/4/2021  4:06 AM    * Acute on chronic respiratory failure with hypoxia and hypercapnia ([facility] )  Assessment & Plan  · Likely secondary to acute on chronic diastolic heart failure, volume overload  · Patient states that he is not always compliant with CPAP at night, sometimes I forget"  · Patient had previous sleep study prior to weight loss surgery in 2018 with BiPAP pressures 22/12  · Continue BiPAP qhs and prn 24/12 R - 12, fio2 40%  · As per patient he is chronically on 2 L NC as per home regimen, will trial NC this AM  · Titrate FiO2 for SpO2 greater than 90%  · No infiltrate noted on CXR  Procalcitonin negative  · Of note patient has history of MRSA positive sputum  · PT/OT   · Pulmonary hygiene  · COVID negative     Acute on chronic diastolic congestive heart failure [facility])  Assessment & Plan  Wt Readings from Last 3 Encounters:   07/06/21 (!) 225 kg (495 lb 9 5 oz)   07/03/21 (!) 239 kg (526 lb)   05/16/21 (!) 239 kg (526 lb 14 4 oz)     · Secondary to volume overload    · CVR: pulmonary vascular congestion, volume overload  · Patient is on lasix 40 mg BID at home   · Continue lasix IV 80 mg daily & as needed for net goal -1 L/24 hrs while inpatient   · Continue home BB   · No evidence of ischemia on EKG  · proBNP 1440 on admission  · Had recent echo 05/2021 EF 60%, noted as technically difficult study secondary to patient's body habitus  · Daily weights  · Strict I/O  · Repeat 2D echo obtained, final read pending        Atrial fibrillation ([facility] )  Assessment & Plan  · AFib at baseline  · Received Cardizem in ER for AFib with RVR  · Currently rate controlled 80-85s  · Continue home Lopressor p o  25 mg q 12 hours  · Continue home Pradaxa    Bacteremia due to Gram-positive bacteria  Assessment & Plan  · Bcx from 7/3 2/2 bottles + for gram positive cocci in clusters (possible contamination ?) pending speciation   · Repeat bcx from 7/4 reveal no growth x 24 hrs   · Patient + MRSA swab  · Treated with vancomycin & cefepime D#2  · Procal negative   · Unclear source   · Trend Temp & WBC   · Patient does not appear infectious on exam   · cxr Moderate pulmonary edema  · U/a negative   · Abdominal exam is benign   · No chronic lines or catheters     COPD (chronic obstructive pulmonary disease) ([facility])  Assessment & Plan  · Without acute exacerbation  · Diminished breath sounds noted bilaterally, no evidence of wheezing  · Continue Symbicort  · Continue nebulizers as needed with Xopenex/Atrovent for shortness of breath/wheeze  · Serum CO2 chronically elevated  · Other interventions as above     Diabetes mellitus [facility])  Assessment & Plan  Lab Results   Component Value Date    HGBA1C 5 3 06/30/2021       Recent Labs     07/05/21  1055 07/05/21  1601 07/05/21  2049 07/06/21  0650   POCGLU 158* 133 166* 131       Blood Sugar Average: Last 72 hrs:  (P) 136 [id number]   · Holding patient's home Lantus, reportedly not taking  Hgb A1c this admission 5 3%  · Accu-Cheks ACHS with SSI  · Diabetic/Cardiac diet     History of DVT (deep vein thrombosis)  Assessment & Plan  · 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    Hypothyroidism  Assessment & Plan  · Continue home levothyroxine  · Last TSH 5/2021 2 1    Obesity hypoventilation syndrome (HCC)  Assessment & Plan  · Continue BiPAP 24/12 qhs and as needed    Morbid obesity ([address] )  Assessment & Plan  · History of gastric bypass surgery 2018  · BMI on admission 70 23 kg/m2  · Nutrition consult    ----------------------------------------------------------------------------------------  HPI/24hr events: No acute events overnight, tolerated Bipap 24/12 requesting to come off this morning   Bcx 2/2 + for gram positive cocci in clusters, started on cefepime & vanc      Disposition: Transfer to Med-Surg if able to transition back to home NC o2  Code Status: Level 1 - Full Code  ---------------------------------------------------------------------------------------  SUBJECTIVE  "I'm okay, when can I take this mask off ? "     Review of Systems   Constitutional: Negative  HENT: Negative  Eyes: Negative  Respiratory: Positive for cough  Cardiovascular: Positive for leg swelling  Gastrointestinal: Negative  Endocrine: Negative  Genitourinary: Negative  Musculoskeletal: Negative  Skin: Negative  Allergic/Immunologic: Negative  Neurological: Negative  Hematological: Negative  Psychiatric/Behavioral: Negative       ---------------------------------------------------------------------------------------  OBJECTIVE    Vitals   Vitals:    21 0600 21 0648 21 0721 07   BP:  113/73     BP Location:  Left arm     Pulse:  88     Resp:  (!) 28     Temp:  97 8 °F (36 6 °C)     TempSrc:  Axillary     SpO2:  99%  99%   Weight: (!) 225 kg (495 lb 9 5 oz)  (!) 220 kg (485 lb 14 3 oz)    Height:         Temp (24hrs), Av 3 °F (36 8 °C), Min:97 8 °F (36 6 °C), Max:99 2 °F (37 3 °C)  Current: Temperature: 97 8 °F (36 6 °C)          Respiratory:  SpO2: SpO2: 99 %  Nasal Cannula O2 Flow Rate (L/min): 45 L/min    Invasive/non-invasive ventilation settings   Respiratory    Lab Data (Last 4 hours)    None         O2/Vent Data (Last 4 hours)      727          Non-Invasive Ventilation Mode HFNC (High flow)                   Physical Exam  Constitutional:       Appearance: Normal appearance  HENT:      Head: Normocephalic  Right Ear: External ear normal       Left Ear: External ear normal       Nose: Nose normal       Mouth/Throat:      Mouth: Mucous membranes are dry  Pharynx: Oropharynx is clear  Eyes:      Extraocular Movements: Extraocular movements intact        Conjunctiva/sclera: Conjunctivae normal  Pupils: Pupils are equal, round, and reactive to light  Cardiovascular:      Rate and Rhythm: Normal rate  Rhythm irregular  Pulses:           Radial pulses are 2+ on the right side and 2+ on the left side  Dorsalis pedis pulses are 1+ on the right side and 1+ on the left side  Heart sounds: Normal heart sounds  Pulmonary:      Effort: Pulmonary effort is normal       Breath sounds: Examination of the right-lower field reveals decreased breath sounds  Examination of the left-lower field reveals decreased breath sounds  Decreased breath sounds present  Abdominal:      General: Bowel sounds are normal       Palpations: Abdomen is soft  Tenderness: There is no abdominal tenderness  Musculoskeletal:      Cervical back: Normal range of motion  Comments: Normal ROM    Lymphadenopathy:      Cervical: No cervical adenopathy  Skin:     General: Skin is warm and dry  Capillary Refill: Capillary refill takes less than 2 seconds  Nails: There is no clubbing  Neurological:      Mental Status: He is alert and oriented to person, place, and time     Psychiatric:         Mood and Affect: Mood normal          Cognition and Memory: Cognition normal          Laboratory and Diagnostics:  Results from last 7 days   Lab Units 07/06/21  0523 07/05/21  0505 07/04/21  0519 07/03/21  2114   WBC Thousand/uL 5 38 6 03 5 95 7 45   HEMOGLOBIN g/dL 12 2 12 1 12 3 12 7   HEMATOCRIT % 38 8 37 9 40 2 39 8   PLATELETS Thousands/uL 162 117* 135* 161   NEUTROS PCT % 67 72 76* 75   MONOS PCT % 6 6 7 7     Results from last 7 days   Lab Units 07/06/21  0523 07/05/21  0635 07/04/21  1426 07/04/21  0519 07/03/21  2114   SODIUM mmol/L 141 143 145 145 141   POTASSIUM mmol/L 3 7 3 4* 3 6 3 4* 3 7   CHLORIDE mmol/L 100 101 99* 100 92*   CO2 mmol/L 38* 45* >45* 45* 45*   ANION GAP mmol/L 3* -3*  --  0* 4   BUN mg/dL 17 12 11 13 15   CREATININE mg/dL 0 86 0 92 0 88 0 95 0 91   CALCIUM mg/dL 9 0 8 8 9 0 9 0 9 3 GLUCOSE RANDOM mg/dL 125 117 107 146* 120   ALT U/L  --  15  --   --  11   AST U/L  --  13  --   --  10*   ALK PHOS U/L  --  67  --   --  60 1   ALBUMIN g/dL  --  2 9*  --   --  3 8   TOTAL BILIRUBIN mg/dL  --  1 06*  --   --  0 71     Results from last 7 days   Lab Units 07/06/21  0523 07/05/21  0635 07/04/21  1426 07/04/21  0519 07/03/21  2114   MAGNESIUM mg/dL 2 2 2 0 1 9 1 7 1 9   PHOSPHORUS mg/dL 4 1  --   --  3 4  --       Results from last 7 days   Lab Units 07/03/21  2114   INR  0 96   PTT seconds 27      Results from last 7 days   Lab Units 07/04/21  1128 07/04/21  0804 07/04/21  0519 07/03/21  2114   TROPONIN I ng/mL <0 02 <0 02 <0 02 <0 03     Results from last 7 days   Lab Units 07/03/21 2117   LACTIC ACID mmol/L 1 5     ABG:  Results from last 7 days   Lab Units 07/03/21  2241   PH ART  7 355   PCO2 ART mm Hg 82 5*   PO2 ART mm Hg 96 8   HCO3 ART mmol/L 45 0*   BASE EXC ART mmol/L 15 6   ABG SOURCE  Radial, Right     VBG:  Results from last 7 days   Lab Units 07/04/21  1426 07/03/21  2241   PH TRACI  7 364  --    PCO2 TRACI mm Hg 92 9*  --    PO2 TRACI mm Hg 53 3*  --    HCO3 TRACI mmol/L 51 8*  --    BASE EXC TRACI mmol/L 21 1  --    ABG SOURCE   --  Radial, Right     Results from last 7 days   Lab Units 07/05/21  0635 07/04/21  0519 07/03/21  2114   PROCALCITONIN ng/ml <0 05 <0 05 <0 05       Micro  Results from last 7 days   Lab Units 07/04/21  0525 07/04/21  0519 07/04/21  0500 07/03/21 2116   BLOOD CULTURE   --   --  No Growth at 24 hrs  No Growth at 24 hrs   --    GRAM STAIN RESULT   --   --   --  Gram positive cocci in clusters*  Gram positive cocci in clusters*   MRSA CULTURE ONLY  Methicillin Resistant Staphylococcus aureus isolated*  This patient requires contact isolation precautions per [place] law  Contact precautions are not required in [place] for nasal surveillance cultures    --   --   --    LEGIONELLA URINARY ANTIGEN   --  Negative  --   --    STREP PNEUMONIAE ANTIGEN, URINE   -- Negative  --   --        EKG: afib   Imaging: I have personally reviewed pertinent reports  Intake and Output  I/O       07/04 0701 - 07/05 0700 07/05 0701 - 07/06 0700 07/06 0701 - 07/07 0700    P  O  590 780     I V  (mL/kg) 60 (0 3)      IV Piggyback 250 50     Total Intake(mL/kg) 900 (4) 830 (3 7)     Urine (mL/kg/hr) 3275 (0 6) 3375 (0 6)     Total Output 3275 3375     Net -9036 -2673                  Height and Weights   Height: 5' 10 51" (179 1 cm)  IBW (Ideal Body Weight): 74 18 kg  Body mass index is 68 71 kg/m²  Weight (last 2 days)     Date/Time   Weight    07/06/21 0720   (!) 220 (485 89)    07/06/21 0600   (!) 225 (495 59)    07/04/21 0600   (!) 225 (496 48)    07/04/21 0408   (!) 225 (496 48)                Nutrition       Diet Orders   (From admission, onward)             Start     Ordered    07/04/21 1608  Diet Bariatric; Bariatric Maintenance  Diet effective now     Question Answer Comment   Diet Type Bariatric    Bariatric Bariatric Maintenance    RD to adjust diet per protocol?  Yes        07/04/21 1608    07/04/21 0802  Room Service  Once     Question:  Type of Service  Answer:  Room Service- Not Appropriate    07/04/21 0801                  Active Medications  Scheduled Meds:  Current Facility-Administered Medications   Medication Dose Route Frequency Provider Last Rate    acetaminophen  650 mg Oral Q6H PRN Pearletha Hailstone, CRNP      budesonide-formoterol  2 puff Inhalation BID Pearletha Haitonytone, CRNP      buPROPion  100 mg Oral Daily Pearletha Hailstone, [address]      calcium carbonate  500 mg Oral Daily PRN Eyal Thurman PA-C      cefepime  2,000 mg Intravenous Q12H Pearfany Haitonytone CRNP 2,000 mg (07/06/21 0336)    cholecalciferol  2,000 Units Oral Daily Pearletha Hailstone, CRNP      cyanocobalamin  1,000 mcg Oral Daily Pearletha Hailstone, CRNP      dabigatran etexilate  150 mg Oral BID Pearletha Hailstone, CRNP      insulin lispro  1-5 Units Subcutaneous HS RADHA Lin      insulin lispro 1-6 Units Subcutaneous TID AC Vannie Reading, CRNP      levalbuterol  1 25 mg Nebulization Q6H PRN Vannie Reading, CRNP      And    ipratropium  0 5 mg Nebulization Q6H PRN Vannie Reading, CRNP      levothyroxine  25 mcg Oral Early Morning Vannie Reading, CRNP      metoprolol tartrate  25 mg Oral Q12H [address] Vannie Reading, CRNP      pantoprazole  20 mg Oral Early Morning Vannie Reading, CRNP      potassium chloride  40 mEq Oral Once Tory Canard, CRNP      vancomycin  15 mg/kg (Adjusted) Intravenous Q12H Vannie Reading, CRNP 2,000 mg (07/06/21 0440)     Continuous Infusions:     PRN Meds:   acetaminophen, 650 mg, Q6H PRN  calcium carbonate, 500 mg, Daily PRN  levalbuterol, 1 25 mg, Q6H PRN   And  ipratropium, 0 5 mg, Q6H PRN        Invasive Devices Review  Invasive Devices     Peripheral Intravenous Line            Peripheral IV 07/03/21 Right Antecubital 2 days    Long-Dwell Peripheral IV (Midline) 46/86/83 Left Cephalic <1 day                Rationale for remaining devices: n/a   ---------------------------------------------------------------------------------------  Advance Directive and Living Will:      Power of :    POLST:    ---------------------------------------------------------------------------------------  Care Time Delivered:   No Critical Care time spent       RADHA Rahman      Portions of the record may have been created with voice recognition software  Occasional wrong word or "sound a like" substitutions may have occurred due to the inherent limitations of voice recognition software    Read the chart carefully and recognize, using context, where substitutions have occurred

## 2021-07-06 NOTE — PROGRESS NOTES
Patient resting in bed, no complaints of pain  AAOx4  IV antibiotics infusing  2L NC , saturation of 97%  Breathing comfortably  All belongings close in reach

## 2021-07-06 NOTE — ASSESSMENT & PLAN NOTE
· AFib at baseline  · Received Cardizem in ER for AFib with RVR  · Currently rate controlled 80-85s  · Continue home Lopressor p o  25 mg q 12 hours  · Continue home Pradaxa

## 2021-07-06 NOTE — NURSING NOTE
Dressing found to be non-occlusive  Dressing removed and using sterile technique, dressing was changed per policy  Patient tolerated well

## 2021-07-06 NOTE — ASSESSMENT & PLAN NOTE
Wt Readings from Last 3 Encounters:   07/06/21 (!) 225 kg (495 lb 9 5 oz)   07/03/21 (!) 239 kg (526 lb)   05/16/21 (!) 239 kg (526 lb 14 4 oz)     · Secondary to volume overload    · CVR: pulmonary vascular congestion, volume overload  · Patient is on lasix 40 mg BID at home   · Continue lasix IV 80 mg daily & as needed for net goal -1 L/24 hrs while inpatient   · Continue home BB   · No evidence of ischemia on EKG  · proBNP 1440 on admission  · Had recent echo 05/2021 EF 60%, noted as technically difficult study secondary to patient's body habitus  · Daily weights  · Strict I/O  · Repeat 2D echo obtained, final read pending

## 2021-07-06 NOTE — ASSESSMENT & PLAN NOTE
· Likely secondary to acute on chronic diastolic heart failure, volume overload  · Patient states that he is not always compliant with CPAP at night, sometimes I forget"  · Patient had previous sleep study prior to weight loss surgery in 2018 with BiPAP pressures 22/12  · Continue BiPAP qhs and prn 24/12 R - 12, fio2 40%  · As per patient he is chronically on 2 L NC as per home regimen, will trial NC this AM  · Titrate FiO2 for SpO2 greater than 90%  · No infiltrate noted on CXR   Procalcitonin negative  · Of note patient has history of MRSA positive sputum  · PT/OT   · Pulmonary hygiene  · COVID negative

## 2021-07-07 LAB
ANION GAP SERPL CALCULATED.3IONS-SCNC: 1 MMOL/L (ref 4–13)
BUN SERPL-MCNC: 16 MG/DL (ref 5–25)
CALCIUM SERPL-MCNC: 9.1 MG/DL (ref 8.3–10.1)
CHLORIDE SERPL-SCNC: 101 MMOL/L (ref 100–108)
CO2 SERPL-SCNC: 40 MMOL/L (ref 21–32)
CREAT SERPL-MCNC: 0.85 MG/DL (ref 0.6–1.3)
ERYTHROCYTE [DISTWIDTH] IN BLOOD BY AUTOMATED COUNT: 13.4 % (ref 11.6–15.1)
GFR SERPL CREATININE-BSD FRML MDRD: 102 ML/MIN/1.73SQ M
GLUCOSE SERPL-MCNC: 104 MG/DL (ref 65–140)
GLUCOSE SERPL-MCNC: 109 MG/DL (ref 65–140)
GLUCOSE SERPL-MCNC: 110 MG/DL (ref 65–140)
GLUCOSE SERPL-MCNC: 115 MG/DL (ref 65–140)
GLUCOSE SERPL-MCNC: 169 MG/DL (ref 65–140)
HCT VFR BLD AUTO: 39.4 % (ref 36.5–49.3)
HGB BLD-MCNC: 12.7 G/DL (ref 12–17)
MAGNESIUM SERPL-MCNC: 2.2 MG/DL (ref 1.6–2.6)
MCH RBC QN AUTO: 30.8 PG (ref 26.8–34.3)
MCHC RBC AUTO-ENTMCNC: 32.2 G/DL (ref 31.4–37.4)
MCV RBC AUTO: 95 FL (ref 82–98)
PLATELET # BLD AUTO: 168 THOUSANDS/UL (ref 149–390)
PMV BLD AUTO: 9.9 FL (ref 8.9–12.7)
POTASSIUM SERPL-SCNC: 3.7 MMOL/L (ref 3.5–5.3)
PROCALCITONIN SERPL-MCNC: <0.05 NG/ML
RBC # BLD AUTO: 4.13 MILLION/UL (ref 3.88–5.62)
SODIUM SERPL-SCNC: 142 MMOL/L (ref 136–145)
WBC # BLD AUTO: 6.2 THOUSAND/UL (ref 4.31–10.16)

## 2021-07-07 PROCEDURE — 94762 N-INVAS EAR/PLS OXIMTRY CONT: CPT

## 2021-07-07 PROCEDURE — 83735 ASSAY OF MAGNESIUM: CPT | Performed by: NURSE PRACTITIONER

## 2021-07-07 PROCEDURE — 94660 CPAP INITIATION&MGMT: CPT

## 2021-07-07 PROCEDURE — 94760 N-INVAS EAR/PLS OXIMETRY 1: CPT

## 2021-07-07 PROCEDURE — 82948 REAGENT STRIP/BLOOD GLUCOSE: CPT

## 2021-07-07 PROCEDURE — 84145 PROCALCITONIN (PCT): CPT | Performed by: NURSE PRACTITIONER

## 2021-07-07 PROCEDURE — 80048 BASIC METABOLIC PNL TOTAL CA: CPT | Performed by: NURSE PRACTITIONER

## 2021-07-07 PROCEDURE — 85027 COMPLETE CBC AUTOMATED: CPT | Performed by: NURSE PRACTITIONER

## 2021-07-07 PROCEDURE — 99233 SBSQ HOSP IP/OBS HIGH 50: CPT | Performed by: STUDENT IN AN ORGANIZED HEALTH CARE EDUCATION/TRAINING PROGRAM

## 2021-07-07 RX ORDER — DIGOXIN 125 MCG
125 TABLET ORAL DAILY
Status: DISCONTINUED | OUTPATIENT
Start: 2021-07-07 | End: 2021-07-08 | Stop reason: HOSPADM

## 2021-07-07 RX ORDER — METOPROLOL TARTRATE 50 MG/1
50 TABLET, FILM COATED ORAL EVERY 12 HOURS SCHEDULED
Status: DISCONTINUED | OUTPATIENT
Start: 2021-07-07 | End: 2021-07-07

## 2021-07-07 RX ADMIN — BUDESONIDE AND FORMOTEROL FUMARATE DIHYDRATE 2 PUFF: 160; 4.5 AEROSOL RESPIRATORY (INHALATION) at 08:21

## 2021-07-07 RX ADMIN — BUPROPION HYDROCHLORIDE 100 MG: 100 TABLET, FILM COATED ORAL at 08:20

## 2021-07-07 RX ADMIN — CEFEPIME HYDROCHLORIDE 2000 MG: 2 INJECTION, POWDER, FOR SOLUTION INTRAVENOUS at 14:38

## 2021-07-07 RX ADMIN — FUROSEMIDE 40 MG: 40 TABLET ORAL at 08:20

## 2021-07-07 RX ADMIN — DIGOXIN 125 MCG: 125 TABLET ORAL at 14:40

## 2021-07-07 RX ADMIN — METOPROLOL TARTRATE 25 MG: 25 TABLET, FILM COATED ORAL at 08:20

## 2021-07-07 RX ADMIN — VANCOMYCIN HYDROCHLORIDE 2000 MG: 1 INJECTION, POWDER, LYOPHILIZED, FOR SOLUTION INTRAVENOUS at 15:29

## 2021-07-07 RX ADMIN — CEFEPIME HYDROCHLORIDE 2000 MG: 2 INJECTION, POWDER, FOR SOLUTION INTRAVENOUS at 02:53

## 2021-07-07 RX ADMIN — LEVOTHYROXINE SODIUM 25 MCG: 25 TABLET ORAL at 06:23

## 2021-07-07 RX ADMIN — Medication 2000 UNITS: at 08:21

## 2021-07-07 RX ADMIN — DABIGATRAN ETEXILATE MESYLATE 150 MG: 150 CAPSULE ORAL at 17:19

## 2021-07-07 RX ADMIN — DABIGATRAN ETEXILATE MESYLATE 150 MG: 150 CAPSULE ORAL at 08:20

## 2021-07-07 RX ADMIN — FUROSEMIDE 40 MG: 40 TABLET ORAL at 17:19

## 2021-07-07 RX ADMIN — VANCOMYCIN HYDROCHLORIDE 2000 MG: 1 INJECTION, POWDER, LYOPHILIZED, FOR SOLUTION INTRAVENOUS at 03:57

## 2021-07-07 RX ADMIN — SPIRONOLACTONE 50 MG: 25 TABLET, FILM COATED ORAL at 08:20

## 2021-07-07 RX ADMIN — METOPROLOL TARTRATE 25 MG: 25 TABLET, FILM COATED ORAL at 21:00

## 2021-07-07 RX ADMIN — PANTOPRAZOLE SODIUM 20 MG: 20 TABLET, DELAYED RELEASE ORAL at 06:23

## 2021-07-07 RX ADMIN — CYANOCOBALAMIN TAB 500 MCG 1000 MCG: 500 TAB at 08:21

## 2021-07-07 RX ADMIN — BUDESONIDE AND FORMOTEROL FUMARATE DIHYDRATE 2 PUFF: 160; 4.5 AEROSOL RESPIRATORY (INHALATION) at 17:42

## 2021-07-07 NOTE — PROGRESS NOTES
[facility]  Progress Note - Lakshmi Judge 1970, 49 y o  male MRN: [id number]  Unit/Bed#: ICU 01 Encounter: [id number]  Primary Care Provider: Juliana Laughlin MD   Date and time admitted to hospital: 7/4/2021  4:06 AM    * Acute on chronic respiratory failure with hypoxia and hypercapnia ([facility] [address] )  Assessment & Plan  · Likely secondary to acute on chronic diastolic heart failure, volume overload  · Likely secondary to acute on chronic diastolic heart failure and non compliance with home CPAP  · Admitted to ICU, now downgrade to medicine after stabilization of respiratory status with IV diuresis   · Transitioned to PO lasix, continue with Bipap, on baseline oxygen requirements of 2 liters at this time  · Acute respiratory failure has resolved      Bacteremia due to Gram-positive bacteria  Assessment & Plan  · Blood cultures from 7/3 + for coag negative staph and staph epi, likely contaminant  · Repeat blood cultures negative for Growth  · On IV antibiotics- Day 3 of vancomycin and cefepime  · Can discontinue antibiotics in AM given patient is afebrile with no leukocytosis and negative procalcitonin   · Patient + MRSA swab- transition vancomycin to PO doxycycline in AM     Acute on chronic diastolic congestive heart failure (HCC)  Assessment & Plan  Wt Readings from Last 3 Encounters:   07/07/21 (!) 223 kg (490 lb 15 4 oz)   07/03/21 (!) 239 kg (526 lb)   05/16/21 (!) 239 kg (526 lb 14 4 oz)     · As evidenced by elevated BNP and acute on chronic hypoxic respiratory failure with CXR showing vascular congestion  · Net negative 4+ liters, responded well to IV diuresis, now adjusted back to home regimen  · Monitor I/O, lytes, weight   · Resolved    Atrial fibrillation (HCC)  Assessment & Plan  · AFib at baseline  · Received Cardizem in ER for AFib with RVR  · No longer in RVR, but heart rate is persistently in  range which could be exacerbated by above, but also not restarting home digoxin  · Continue Lopressor p o  25 mg q 12 hours, resume home dose digoxin  · Continue home Pradaxa    History of DVT (deep vein thrombosis)  Assessment & Plan  · 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  · Continue home dose pradaxa     Hypothyroidism  Assessment & Plan  · Continue home levothyroxine  · Last TSH 5/2021 2 1    COPD (chronic obstructive pulmonary disease) (HCC)  Assessment & Plan  · Without acute exacerbation  · Diminished breath sounds noted bilaterally, no evidence of wheezing  · Continue Symbicort  · Continue nebulizers as needed with Xopenex/Atrovent for shortness of breath/wheeze  · Serum CO2 chronically elevated  · Other interventions as above     Obesity hypoventilation syndrome ([address] )  Assessment & Plan  · Non compliant with Cpap at home  · Continue bipap during current admission     Diabetes mellitus [facility])  Assessment & Plan  Lab Results   Component Value Date    HGBA1C 5 3 06/30/2021       Recent Labs     07/06/21  1648 07/06/21  2158 07/07/21  0711 07/07/21  1130   POCGLU 136 137 109 104       Blood Sugar Average: Last 72 hrs:  (P) 131 [id number]   · A1c not in diabetic range, Glucose levels adequately controlled, Discontinue insulin sliding scale     Morbid obesity (HCC)  Assessment & Plan  ·  on weight loss         VTE Pharmacologic Prophylaxis: VTE Score: 8 High Risk (Score >/= 5) - Pharmacological DVT Prophylaxis Ordered: dabigatran (Pradaxa)  Sequential Compression Devices Ordered  Patient Centered Rounds: I performed bedside rounds with nursing staff today  Discussions with Specialists or Other Care Team Provider: na    Time Spent for Care: 30 minutes  More than 50% of total time spent on counseling and coordination of care as described above      Current Length of Stay: 3 day(s)  Current Patient Status: Inpatient   Certification Statement: The patient will continue to require additional inpatient hospital stay due to transitioned to PO lasix, monitor overnght  Discharge Plan: Anticipate discharge in 24-48 hrs to rehab facility  Code Status: Level 1 - Full Code    Subjective:   No chest pain or chest palpitations  No shortness of breath  Appetite is good  Objective:     Vitals:   Temp (24hrs), Av 1 °F (36 7 °C), Min:97 7 °F (36 5 °C), Max:98 7 °F (37 1 °C)    Temp:  [97 7 °F (36 5 °C)-98 7 °F (37 1 °C)] 97 8 °F (36 6 °C)  HR:  [] 92  Resp:  [20-47] 30  BP: (110-137)/(57-71) 116/65  SpO2:  [97 %-100 %] 99 %  Body mass index is 69 43 kg/m²  Input and Output Summary (last 24 hours): Intake/Output Summary (Last 24 hours) at 2021 1421  Last data filed at 2021 1401  Gross per 24 hour   Intake 1996 ml   Output 1125 ml   Net 871 ml       Physical Exam:   Physical Exam  Vitals and nursing note reviewed  Constitutional:       Appearance: Normal appearance  He is obese  HENT:      Head: Normocephalic and atraumatic  Right Ear: External ear normal       Left Ear: External ear normal       Nose: No congestion or rhinorrhea  Mouth/Throat:      Mouth: Mucous membranes are dry  Pharynx: Oropharynx is clear  Eyes:      General:         Right eye: No discharge  Left eye: No discharge  Cardiovascular:      Rate and Rhythm: Normal rate and regular rhythm  Pulmonary:      Effort: Pulmonary effort is normal  No respiratory distress  Comments: On 2 liters nasal canula   Abdominal:      General: Abdomen is flat  Palpations: Abdomen is soft  Musculoskeletal:      Cervical back: Normal range of motion and neck supple  Skin:     General: Skin is warm and dry  Neurological:      General: No focal deficit present  Mental Status: He is alert  Mental status is at baseline     Psychiatric:         Mood and Affect: Mood normal          Behavior: Behavior normal           Additional Data:     Labs:  Results from last 7 days   Lab Units 21  0633 21  0523   WBC Thousand/uL 6 20 5 38   HEMOGLOBIN g/dL 12 7 12 2   HEMATOCRIT % 39 4 38 8   PLATELETS Thousands/uL 168 162   NEUTROS PCT %  --  67   LYMPHS PCT %  --  24   MONOS PCT %  --  6   EOS PCT %  --  3     Results from last 7 days   Lab Units 07/07/21  0633 07/05/21  0635   SODIUM mmol/L 142 143   POTASSIUM mmol/L 3 7 3 4*   CHLORIDE mmol/L 101 101   CO2 mmol/L 40* 45*   BUN mg/dL 16 12   CREATININE mg/dL 0 85 0 92   ANION GAP mmol/L 1* -3*   CALCIUM mg/dL 9 1 8 8   ALBUMIN g/dL  --  2 9*   TOTAL BILIRUBIN mg/dL  --  1 06*   ALK PHOS U/L  --  67   ALT U/L  --  15   AST U/L  --  13   GLUCOSE RANDOM mg/dL 110 117     Results from last 7 days   Lab Units 07/03/21  2114   INR  0 96     Results from last 7 days   Lab Units 07/07/21  1130 07/07/21  0711 07/06/21  2158 07/06/21  1648 07/06/21  1112 07/06/21  0650 07/05/21  2049 07/05/21  1601 07/05/21  1055 07/05/21  0654 07/05/21  0039 07/04/21  1804   POC GLUCOSE mg/dl 104 109 137 136 125 131 166* 133 158* 118 108 157*         Results from last 7 days   Lab Units 07/07/21  0633 07/05/21  0635 07/04/21  0519 07/03/21 2117 07/03/21  2114   LACTIC ACID mmol/L  --   --   --  1 5  --    PROCALCITONIN ng/ml <0 05 <0 05 <0 05  --  <0 05       Lines/Drains:  Invasive Devices     Peripheral Intravenous Line            Long-Dwell Peripheral IV (Midline) 53/94/26 Left Cephalic 2 days                      Imaging: No pertinent imaging reviewed  Recent Cultures (last 7 days):   Results from last 7 days   Lab Units 07/04/21  0519 07/04/21  0500 07/03/21 2116   BLOOD CULTURE   --  No Growth at 72 hrs  No Growth at 72 hrs   Staphylococcus coagulase negative*  Staphylococcus epidermidis*   GRAM STAIN RESULT   --   --  Gram positive cocci in clusters*  Gram positive cocci in clusters*   LEGIONELLA URINARY ANTIGEN  Negative  --   --        Last 24 Hours Medication List:   Current Facility-Administered Medications   Medication Dose Route Frequency Provider Last Rate    acetaminophen  650 mg Oral Q6H PRN Mitch Tucker, CRNP      budesonide-formoterol  2 puff Inhalation BID Charlsie Ana, CRNP      buPROPion  100 mg Oral Daily Charlsie Ana, CRNP      calcium carbonate  500 mg Oral Daily PRN Charlsie Ana, CRNP      cefepime  2,000 mg Intravenous Q12H Charlsie Ana, CRNP Stopped (07/07/21 0336)    cholecalciferol  2,000 Units Oral Daily Charlsie Ana, CRNP      cyanocobalamin  1,000 mcg Oral Daily Charlsie Ana, CRNP      dabigatran etexilate  150 mg Oral BID Charlsie Ana, CRNP      digoxin  125 mcg Oral Daily Edyta Barlow MD      furosemide  40 mg Oral BID Charlsie Ana, CRNP      gabapentin  300 mg Oral BID PRN Ninilsie Ana, CRNP      levalbuterol  1 25 mg Nebulization Q6H PRN Ninisusan Ana, CRNP      And    ipratropium  0 5 mg Nebulization Q6H PRN Katy Ana, CRNP      levothyroxine  25 mcg Oral Early Morning Ninilsie Ana, CRNP      metoprolol tartrate  25 mg Oral Q12H [id number] Candi Peterson MD      pantoprazole  20 mg Oral Early Morning Charlsie Aan, CRNP      spironolactone  50 mg Oral Daily Charlsie Ana, CRNP      vancomycin  15 mg/kg (Adjusted) Intravenous Q12H Ninisusan Ana, CRNP Stopped (07/07/21 5565)        Today, Patient Was Seen By: Robert Fox MD    **Please Note: This note may have been constructed using a voice recognition system  **

## 2021-07-07 NOTE — OCCUPATIONAL THERAPY NOTE
Occupational Therapy Screen     Patient Name: Lakshmi STARKS'H Date: 7/7/2021  Problem List  Principal Problem:    Acute on chronic respiratory failure with hypoxia and hypercapnia (HCC)  Active Problems: Morbid obesity ([facility] [address] )    Diabetes mellitus ([facility] [address] )    Obesity hypoventilation syndrome (HCC)    Acute on chronic diastolic congestive heart failure (HCC)    Atrial fibrillation (HCC)    COPD (chronic obstructive pulmonary disease) (HCC)    Hypothyroidism    History of DVT (deep vein thrombosis)    Bacteremia due to Gram-positive bacteria    Past Medical History  Past Medical History:   Diagnosis Date    Afib ([facility] [address] )     Anemia     Anxiety     [facility]     Cardiac disease     Cellulitis     COPD (chronic obstructive pulmonary disease) ([facility] [address] )     Depression     Diabetes mellitus ([facility] [address] )     DVT (deep venous thrombosis) (HCC)     GERD (gastroesophageal reflux disease)     HBP (high blood pressure)     Heart failure ([facility] )     Hx of blood clots     Hypertension     Hypokalemia     Hypothyroid     Obesity     Psychiatric disorder      Past Surgical History  Past Surgical History:   Procedure Laterality Date    ABDOMINAL HERNIA REPAIR  05/2019    CHOLECYSTECTOMY      Lap    GASTRECTOMY SLEEVE LAPAROSCOPIC  08/2018    KNEE SURGERY Right     open wound, injury     LEG SURGERY Right 2009 07/07/21 1309   OT Last Visit   OT Visit Date 07/07/21  (Wednesday)   Note Type   Note type Screen   Assessment   Assessment OT orders received and chart review completed  Pt admit from [address] [place] [address] Road and needs assistance w/ ADL's at baseline  Recommend active participation in ADL w/ RN staff in acute care  No acute OT needs  Will screen from OT caseload  Please re-consult if acute needs arise   DC OT      [facility], OTR/L

## 2021-07-07 NOTE — ASSESSMENT & PLAN NOTE
Wt Readings from Last 3 Encounters:   07/07/21 (!) 223 kg (490 lb 15 4 oz)   07/03/21 (!) 239 kg (526 lb)   05/16/21 (!) 239 kg (526 lb 14 4 oz)     · As evidenced by elevated BNP and acute on chronic hypoxic respiratory failure with CXR showing vascular congestion  · Net negative 4+ liters, responded well to IV diuresis, now adjusted back to home regimen  · Monitor I/O, lytes, weight   · Resolved

## 2021-07-07 NOTE — ASSESSMENT & PLAN NOTE
Lab Results   Component Value Date    HGBA1C 5 3 06/30/2021       Recent Labs     07/06/21  1648 07/06/21  2158 07/07/21  0711 07/07/21  1130   POCGLU 136 137 109 104       Blood Sugar Average: Last 72 hrs:  (P) 131 [id number]   · A1c not in diabetic range, Glucose levels adequately controlled, Discontinue insulin sliding scale

## 2021-07-07 NOTE — ASSESSMENT & PLAN NOTE
· Likely secondary to acute on chronic diastolic heart failure, volume overload  · Likely secondary to acute on chronic diastolic heart failure and non compliance with home CPAP  · Admitted to ICU, now downgrade to medicine after stabilization of respiratory status with IV diuresis   · Transitioned to PO lasix, continue with Bipap, on baseline oxygen requirements of 2 liters at this time  · Acute respiratory failure has resolved

## 2021-07-07 NOTE — ASSESSMENT & PLAN NOTE
· Blood cultures from 7/3 + for coag negative staph and staph epi, likely contaminant  · Repeat blood cultures negative for Growth  · On IV antibiotics- Day 3 of vancomycin and cefepime  · Can discontinue antibiotics in AM given patient is afebrile with no leukocytosis and negative procalcitonin   · Patient + MRSA swab- transition vancomycin to PO doxycycline in AM

## 2021-07-07 NOTE — CASE MANAGEMENT
LOS: 3  RISK OF UNPLANNED READMISSION SCORE: 15  30 DAY READMISSION: NO  BUNDLE: NO    CM met with patient at bedside  Patient is alert and oriented  CM name and role reviewed  Patient states that he lives at the [address]  Patient reports that he requires assistance with all ADLs which is provided by nursing staff at [facility]  Patient reports that he uses a cheyanne-RW at baseline, however in the past couple months has become more weak and mostly bedbound/WC-bound  CM consult received for Dispo Planning  Discussed Freedom of Choice with both patient and POA  Choice is to return/continue services  Referral opened to [facility] via [facility]  Patient does not need a prior auth to return however, does need a COVID test     CM also contacted patient's emergency contact his SO Lavinia Wood and reviewed the plan to return to [facility] when medically stable, which she confirmed  Per hospital stay in May 2021 patient's hip-to-hip measurements are 40 which was requested by transportation  Patient denying any other needs from this CM at this time       CM reviewed discharge planning process including the following: identifying caregivers at home, preference for d/c planning needs, [facility] Med[facility] to Bed program, availability of treatment team to discuss questions or concerns patient and/or family may have regarding diagnosis, plan of care, old or new medications and discharge planning  CM will continue to follow for care coordination and update assessment as necessary

## 2021-07-07 NOTE — ASSESSMENT & PLAN NOTE
· AFib at baseline  · Received Cardizem in ER for AFib with RVR  · No longer in RVR, but heart rate is persistently in  range which could be exacerbated by above, but also not restarting home digoxin  · Continue Lopressor p o  25 mg q 12 hours, resume home dose digoxin  · Continue home Pradaxa

## 2021-07-08 VITALS
HEIGHT: 71 IN | OXYGEN SATURATION: 99 % | TEMPERATURE: 97.6 F | HEART RATE: 103 BPM | BODY MASS INDEX: 44.1 KG/M2 | DIASTOLIC BLOOD PRESSURE: 58 MMHG | RESPIRATION RATE: 20 BRPM | WEIGHT: 315 LBS | SYSTOLIC BLOOD PRESSURE: 112 MMHG

## 2021-07-08 LAB
ATRIAL RATE: 140 BPM
BACTERIA BLD CULT: ABNORMAL
BACTERIA BLD CULT: ABNORMAL
GLUCOSE SERPL-MCNC: 108 MG/DL (ref 65–140)
GLUCOSE SERPL-MCNC: 98 MG/DL (ref 65–140)
GRAM STN SPEC: ABNORMAL
GRAM STN SPEC: ABNORMAL
PR INTERVAL: 140 MS
PROCALCITONIN SERPL-MCNC: <0.05 NG/ML
QRS AXIS: 28 DEGREES
QRSD INTERVAL: 91 MS
QT INTERVAL: 325 MS
QTC INTERVAL: 501 MS
SARS-COV-2 RNA RESP QL NAA+PROBE: NEGATIVE
T WAVE AXIS: 15 DEGREES
VENTRICULAR RATE: 143 BPM

## 2021-07-08 PROCEDURE — 99239 HOSP IP/OBS DSCHRG MGMT >30: CPT | Performed by: PHYSICIAN ASSISTANT

## 2021-07-08 PROCEDURE — 93010 ELECTROCARDIOGRAM REPORT: CPT | Performed by: INTERNAL MEDICINE

## 2021-07-08 PROCEDURE — 94003 VENT MGMT INPAT SUBQ DAY: CPT

## 2021-07-08 PROCEDURE — NC001 PR NO CHARGE: Performed by: PHYSICIAN ASSISTANT

## 2021-07-08 PROCEDURE — U0005 INFEC AGEN DETEC AMPLI PROBE: HCPCS | Performed by: PHYSICIAN ASSISTANT

## 2021-07-08 PROCEDURE — 84145 PROCALCITONIN (PCT): CPT | Performed by: NURSE PRACTITIONER

## 2021-07-08 PROCEDURE — 82948 REAGENT STRIP/BLOOD GLUCOSE: CPT

## 2021-07-08 PROCEDURE — 94760 N-INVAS EAR/PLS OXIMETRY 1: CPT

## 2021-07-08 PROCEDURE — U0003 INFECTIOUS AGENT DETECTION BY NUCLEIC ACID (DNA OR RNA); SEVERE ACUTE RESPIRATORY SYNDROME CORONAVIRUS 2 (SARS-COV-2) (CORONAVIRUS DISEASE [COVID-19]), AMPLIFIED PROBE TECHNIQUE, MAKING USE OF HIGH THROUGHPUT TECHNOLOGIES AS DESCRIBED BY CMS-2020-01-R: HCPCS | Performed by: PHYSICIAN ASSISTANT

## 2021-07-08 PROCEDURE — 94762 N-INVAS EAR/PLS OXIMTRY CONT: CPT

## 2021-07-08 RX ORDER — BUPROPION HYDROCHLORIDE 100 MG/1
150 TABLET ORAL DAILY
Start: 2021-07-08 | End: 2021-11-15 | Stop reason: HOSPADM

## 2021-07-08 RX ADMIN — DABIGATRAN ETEXILATE MESYLATE 150 MG: 150 CAPSULE ORAL at 08:58

## 2021-07-08 RX ADMIN — METOPROLOL TARTRATE 25 MG: 25 TABLET, FILM COATED ORAL at 08:57

## 2021-07-08 RX ADMIN — PANTOPRAZOLE SODIUM 20 MG: 20 TABLET, DELAYED RELEASE ORAL at 05:33

## 2021-07-08 RX ADMIN — DIGOXIN 125 MCG: 125 TABLET ORAL at 08:59

## 2021-07-08 RX ADMIN — FUROSEMIDE 40 MG: 40 TABLET ORAL at 08:58

## 2021-07-08 RX ADMIN — CYANOCOBALAMIN TAB 500 MCG 1000 MCG: 500 TAB at 08:58

## 2021-07-08 RX ADMIN — CEFEPIME HYDROCHLORIDE 2000 MG: 2 INJECTION, POWDER, FOR SOLUTION INTRAVENOUS at 03:09

## 2021-07-08 RX ADMIN — VANCOMYCIN HYDROCHLORIDE 2000 MG: 1 INJECTION, POWDER, LYOPHILIZED, FOR SOLUTION INTRAVENOUS at 03:52

## 2021-07-08 RX ADMIN — Medication 2000 UNITS: at 08:58

## 2021-07-08 RX ADMIN — BUDESONIDE AND FORMOTEROL FUMARATE DIHYDRATE 2 PUFF: 160; 4.5 AEROSOL RESPIRATORY (INHALATION) at 08:59

## 2021-07-08 RX ADMIN — LEVOTHYROXINE SODIUM 25 MCG: 25 TABLET ORAL at 05:33

## 2021-07-08 RX ADMIN — SPIRONOLACTONE 50 MG: 25 TABLET, FILM COATED ORAL at 08:57

## 2021-07-08 RX ADMIN — BUPROPION HYDROCHLORIDE 100 MG: 100 TABLET, FILM COATED ORAL at 08:59

## 2021-07-08 NOTE — ASSESSMENT & PLAN NOTE
· Likely secondary to acute on chronic diastolic heart failure, superimposed on morbid obesity with OHS/FILIPPO  · Admitted to ICU, then downgraded to medicine after stabilization of respiratory status with IV diuresis   · Transitioned to PO lasix, continue with Bipap, on baseline oxygen requirements of 2 liters at this time  · Acute respiratory failure has resolved

## 2021-07-08 NOTE — ASSESSMENT & PLAN NOTE
· Blood cultures from 7/3 + for coag negative staph (staph pettenkoferi species) and staph epi, likely contaminant  · Repeat blood cultures negative for Growth  · Patient was no cellulitis or sepsis criteria  · Reviewed case curbside with Infectious Disease    No indication for ongoing antibiotics, discontinue cefepime and Vanco

## 2021-07-08 NOTE — CASE MANAGEMENT
CM notified that [facility] will be providing the transport at 1:00 PM     Primary nurse, [facility], facility, and patient all aware  CM reviewed the availability of treatment team to discuss questions or concerns patient and/or family may have regarding  understanding medications and recognizing signs and symptoms once discharged  CM also encouraged patient to follow up with all recommended appointments after discharge  Patient advised of importance for patient and family to participate in managing patient's medical well being

## 2021-07-08 NOTE — ASSESSMENT & PLAN NOTE
· AFib at baseline  · Received Cardizem in ER for AFib with RVR  · No longer in RVR, but heart rate is persistently in  range which could be exacerbated by above, has restarted home digoxin   Can d/c tele  · Continue Lopressor p o  25 mg q 12 hours  · Continue home Pradaxa

## 2021-07-08 NOTE — CASE MANAGEMENT
CM notified by [facility] patient is medically stable for discharge  CM requested COVID test at facilities request   This was ordered  CM made request via [facility] for bariatric transport return to [place] [facility] [place]  CMN completed  CM department will continue to follow to assist with discharge coordination

## 2021-07-08 NOTE — DISCHARGE SUMMARY
[facility]  Discharge- Aruna Flannery 1970, 49 y o  male MRN: [id number]  Unit/Bed#: ICU 01 Encounter: [id number]  Primary Care Provider: Abimael Yang MD   Date and time admitted to hospital: 7/4/2021  4:06 AM    * Acute on chronic respiratory failure with hypoxia and hypercapnia ([facility] )  Assessment & Plan  · Likely secondary to acute on chronic diastolic heart failure, superimposed on morbid obesity with OHS/FILIPPO  · Admitted to ICU, then downgraded to medicine after stabilization of respiratory status with IV diuresis   · Transitioned to PO lasix, continue with Bipap, on baseline oxygen requirements of 2 liters at this time  · Acute respiratory failure has resolved      Acute on chronic diastolic congestive heart failure (HCC)  Assessment & Plan  Wt Readings from Last 3 Encounters:   07/07/21 (!) 223 kg (490 lb 15 4 oz)   07/03/21 (!) 239 kg (526 lb)   05/16/21 (!) 239 kg (526 lb 14 4 oz)     · As evidenced by elevated BNP and acute on chronic hypoxic respiratory failure with CXR showing vascular congestion  · Net negative 4+ liters, responded well to IV diuresis, now adjusted back to home regimen  · Monitored I/O, lytes, weight       Atrial fibrillation (HCC)  Assessment & Plan  · AFib at baseline  · Received Cardizem in ER for AFib with RVR  · No longer in RVR, but heart rate is persistently in  range which could be exacerbated by above, has restarted home digoxin  Can d/c tele  · Continue Lopressor p o  25 mg q 12 hours  · Continue home Pradaxa    Positive blood culture  Assessment & Plan  · Blood cultures from 7/3 + for coag negative staph (staph pettenkoferi species) and staph epi, likely contaminant  · Repeat blood cultures negative for Growth  · Patient was no cellulitis or sepsis criteria  · Reviewed case curbside with Infectious Disease    No indication for ongoing antibiotics, discontinue cefepime and Vanco    COPD (chronic obstructive pulmonary disease) ([facility] )  Assessment & Plan  · Without acute exacerbation  · Continue Symbicort      History of DVT (deep vein thrombosis)  Assessment & Plan  · 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  · Continue home dose pradaxa     Obesity hypoventilation syndrome/FILIPPO  Assessment & Plan  · Non compliant with Cpap at home--encourage compliance at discharge    Diabetes mellitus [facility])  Assessment & Plan  Lab Results   Component Value Date    HGBA1C 5 3 06/30/2021       Recent Labs     07/07/21  1552 07/07/21  2109 07/08/21  0708 07/08/21  1144   POCGLU 115 169* 98 108       Blood Sugar Average: Last 72 hrs:  (P) 007 [id number]   · A1c not in diabetic range, Glucose levels adequately controlled     Morbid obesity ([facility] [address] )  Assessment & Plan  · BMI 69 43--serves as significant comorbidity   on weight loss     Discharging Physician / Practitioner: Zulay England PA-C  PCP: Hipolito Lagos MD  Admission Date:   Admission Orders (From admission, onward)     Ordered        07/04/21 0412  Inpatient Admission  Once                   Discharge Date: 07/08/21    Medical Problems     Resolved Problems  Date Reviewed: 7/8/2021    None                Consultations During Hospital Stay:  · none    Procedures Performed:   · cxr    Significant Findings / Test Results:   · As above    Incidental Findings:   · Positive blood culture, contaminant    Test Results Pending at Discharge (will require follow up): · None     Outpatient Tests Requested:  · Routine BMP    Complications:  none    Reason for Admission: shortness of breath    Hospital Course:     An Espinoza is a 49 y o  male patient who originally presented to the hospital on 7/4/2021 due to 1 week of increasing shortness of breath  Patient is known to have underlying morbid obesity with sleep apnea/obesity hypoventilation and COPD but was felt to have decompensated acute on chronic diastolic heart failure as the reason for his acute on chronic hypoxic and hypercapnic respiratory failure    He required BiPAP and was initially cared for by the Critical Care Team before stabilizing and returning to his usual home oral diuretics  He was initially given antibiotics for positive blood culture but this was found to be contaminant and all antibiotics were discontinued  He was stable to return to his facility  Please see above list of diagnoses and related plan for additional information  Condition at Discharge: stable     Discharge Day Visit / Exam:     * Please refer to separate progress note for these details *    Discussion with Family:  Offered, patient declined    Discharge instructions/Information to patient and family:   See after visit summary for information provided to patient and family  Provisions for Follow-Up Care:  See after visit summary for information related to follow-up care and any pertinent home health orders  Disposition:  Return to facility, [facility]    Planned Readmission: none     Discharge Statement:  I spent 40 minutes discharging the patient  This time was spent on the day of discharge  I had direct contact with the patient on the day of discharge  Greater than 50% of the total time was spent examining patient, answering all patient questions, arranging and discussing plan of care with patient as well as directly providing post-discharge instructions  Additional time then spent on discharge activities  Discharge Medications:  See after visit summary for reconciled discharge medications provided to patient and family        ** Please Note: This note has been constructed using a voice recognition system **

## 2021-07-08 NOTE — CASE MANAGEMENT
CM received a call from [facility] requesting updated measurement on patient hip to hip across his back to ensure they send the right EMS truck to support him  CM spoke with primary nurse to request this measurement  CM notified that current hip to hip width is 40"    CM contacted [facility] to provide update and will continue to follow to coordinate discharge  [facility] updated of patient's planned return

## 2021-07-08 NOTE — ASSESSMENT & PLAN NOTE
Lab Results   Component Value Date    HGBA1C 5 3 06/30/2021       Recent Labs     07/07/21  1130 07/07/21  1552 07/07/21  2109 07/08/21  0708   POCGLU 104 115 169* 98       Blood Sugar Average: Last 72 hrs:  (P) 129 [id number]   · A1c not in diabetic range, Glucose levels adequately controlled

## 2021-07-08 NOTE — PROGRESS NOTES
[facility]  Progress Note - Shana Barton 1970, 49 y o  male MRN: [id number]  Unit/Bed#: ICU 01 Encounter: [id number]  Primary Care Provider: Lu Victor MD   Date and time admitted to hospital: 7/4/2021  4:06 AM    * Acute on chronic respiratory failure with hypoxia and hypercapnia ([address] )  Assessment & Plan  · Likely secondary to acute on chronic diastolic heart failure, superimposed on morbid obesity with OHS/FILIPPO  · Admitted to ICU, then downgraded to medicine after stabilization of respiratory status with IV diuresis   · Transitioned to PO lasix, continue with Bipap, on baseline oxygen requirements of 2 liters at this time  · Acute respiratory failure has resolved      Acute on chronic diastolic congestive heart failure (HCC)  Assessment & Plan  Wt Readings from Last 3 Encounters:   07/07/21 (!) 223 kg (490 lb 15 4 oz)   07/03/21 (!) 239 kg (526 lb)   05/16/21 (!) 239 kg (526 lb 14 4 oz)     · As evidenced by elevated BNP and acute on chronic hypoxic respiratory failure with CXR showing vascular congestion  · Net negative 4+ liters, responded well to IV diuresis, now adjusted back to home regimen  · Monitored I/O, lytes, weight       Atrial fibrillation (HCC)  Assessment & Plan  · AFib at baseline  · Received Cardizem in ER for AFib with RVR  · No longer in RVR, but heart rate is persistently in  range which could be exacerbated by above, has restarted home digoxin  Can d/c tele  · Continue Lopressor p o  25 mg q 12 hours  · Continue home Pradaxa    Positive blood culture  Assessment & Plan  · Blood cultures from 7/3 + for coag negative staph (staph pettenkoferi species) and staph epi, likely contaminant  · Repeat blood cultures negative for Growth  · Patient was no cellulitis or sepsis criteria  · Reviewed case curbside with Infectious Disease    No indication for ongoing antibiotics, discontinue cefepime and Vanco    COPD (chronic obstructive pulmonary disease) [facility])  Assessment & Plan  · Without acute exacerbation  · Continue Symbicort      History of DVT (deep vein thrombosis)  Assessment & Plan  · 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  · Continue home dose pradaxa     Obesity hypoventilation syndrome/FILIPPO  Assessment & Plan  · Non compliant with Cpap at home--encourage compliance at discharge    Diabetes mellitus [facility])  Assessment & Plan  Lab Results   Component Value Date    HGBA1C 5 3 06/30/2021       Recent Labs     07/07/21  1130 07/07/21  1552 07/07/21  2109 07/08/21  0708   POCGLU 104 115 169* 98       Blood Sugar Average: Last 72 hrs:  (P) 129 [id number]   · A1c not in diabetic range, Glucose levels adequately controlled     Morbid obesity ([facility] [address] )  Assessment & Plan  · BMI 69 43--serves as significant comorbidity   on weight loss     VTE Pharmacologic Prophylaxis:   Pharmacologic: Dabigatran (Pradaxa)  Mechanical VTE Prophylaxis in Place: No    Patient Centered Rounds: Spoke with RN    Discussions with Specialists or Other Care Team Provider:  Case Management, Infectious Disease    Education and Discussions with Family / Patient:  Offered, patient declined    Time Spent for Care: 30 minutes  More than 50% of total time spent on counseling and coordination of care as described above  Current Length of Stay: 4 day(s)    Current Patient Status: Inpatient   Certification Statement: The patient will continue to require additional inpatient hospital stay due to Unable to get morbidly obese transport    Discharge Plan:  Stable to return to [address] but case management indicated as of this morning we were not yet able to get appropriate transportation for this morbidly obese gentleman  Discharge as soon as able    Code Status: Level 1 - Full Code    Subjective:   Patient reports he is doing fine at this time and denies any fever, chills, recent events of cellulitis, chest pain, shortness of breath    He is on his stable home amounts of oxygen  Objective:     Vitals:   Temp (24hrs), Av 1 °F (36 7 °C), Min:97 6 °F (36 4 °C), Max:98 8 °F (37 1 °C)    Temp:  [97 6 °F (36 4 °C)-98 8 °F (37 1 °C)] 97 6 °F (36 4 °C)  HR:  [] 103  Resp:  [20-28] 20  BP: (106-132)/(56-74) 112/58  SpO2:  [96 %-99 %] 99 %  Body mass index is 69 43 kg/m²  Input and Output Summary (last 24 hours): Intake/Output Summary (Last 24 hours) at 2021 1135  Last data filed at 2021 8442  Gross per 24 hour   Intake 800 ml   Output 1575 ml   Net -775 ml       Physical Exam:     Physical Exam  Constitutional:       General: He is not in acute distress  Appearance: He is obese  He is not ill-appearing, toxic-appearing or diaphoretic  Eyes:      General: No scleral icterus  Right eye: No discharge  Left eye: No discharge  Conjunctiva/sclera: Conjunctivae normal    Cardiovascular:      Rate and Rhythm: Normal rate  Rhythm irregular  Heart sounds: No murmur heard  Pulmonary:      Effort: No respiratory distress  Breath sounds: No stridor  No wheezing or rhonchi  Comments: Distant breath sounds, nasal cannula oxygen in place  No tachypnea or dyspnea or respiratory distress noted  Abdominal:      General: There is no distension  Tenderness: There is no guarding  Musculoskeletal:      Comments: No pitting edema noted   Skin:     Coloration: Skin is not jaundiced or pale  Findings: No bruising, erythema, lesion or rash  Neurological:      General: No focal deficit present  Mental Status: He is alert  Mental status is at baseline  Psychiatric:         Mood and Affect: Mood normal          Thought Content:  Thought content normal            Additional Data:     Labs:    Results from last 7 days   Lab Units 21  0633 21  0523   WBC Thousand/uL 6 20 5 38   HEMOGLOBIN g/dL 12 7 12 2   HEMATOCRIT % 39 4 38 8   PLATELETS Thousands/uL 168 162   NEUTROS PCT %  --  67   LYMPHS PCT %  --  24   MONOS PCT %  --  6   EOS PCT %  --  3     Results from last 7 days   Lab Units 07/07/21  0633 07/05/21  0635   SODIUM mmol/L 142 143   POTASSIUM mmol/L 3 7 3 4*   CHLORIDE mmol/L 101 101   CO2 mmol/L 40* 45*   BUN mg/dL 16 12   CREATININE mg/dL 0 85 0 92   ANION GAP mmol/L 1* -3*   CALCIUM mg/dL 9 1 8 8   ALBUMIN g/dL  --  2 9*   TOTAL BILIRUBIN mg/dL  --  1 06*   ALK PHOS U/L  --  67   ALT U/L  --  15   AST U/L  --  13   GLUCOSE RANDOM mg/dL 110 117     Results from last 7 days   Lab Units 07/03/21  2114   INR  0 96     Results from last 7 days   Lab Units 07/08/21  0708 07/07/21  2109 07/07/21  1552 07/07/21  1130 07/07/21  0711 07/06/21  2158 07/06/21  1648 07/06/21  1112 07/06/21  0650 07/05/21  2049 07/05/21  1601 07/05/21  1055   POC GLUCOSE mg/dl 98 169* 115 104 109 137 136 125 131 166* 133 158*         Results from last 7 days   Lab Units 07/08/21  0545 07/07/21  7210 07/05/21  0635 07/04/21  0519 07/03/21 2117 07/03/21  2114   LACTIC ACID mmol/L  --   --   --   --  1 5  --    PROCALCITONIN ng/ml <0 05 <0 05 <0 05 <0 05  --  <0 05       * I Have Reviewed All Lab Data Listed Above  * Additional Pertinent Lab Tests Reviewed: [address] Admission Reviewed    Imaging:    Imaging Reports Reviewed Today Include:   Imaging Personally Reviewed by Myself Includes:      Recent Cultures (last 7 days):     Results from last 7 days   Lab Units 07/04/21  0519 07/04/21  0500 07/03/21 2116   BLOOD CULTURE   --  No Growth at 72 hrs  No Growth at 72 hrs   Staphylococcus pettenkoferi*  Staphylococcus epidermidis*   GRAM STAIN RESULT   --   --  Gram positive cocci in clusters*  Gram positive cocci in clusters*   LEGIONELLA URINARY ANTIGEN  Negative  --   --        Last 24 Hours Medication List:   Current Facility-Administered Medications   Medication Dose Route Frequency Provider Last Rate    acetaminophen  650 mg Oral Q6H PRN RADHA Healy      budesonide-formoterol  2 puff Inhalation BID RADHA Acevedo      buPROPion  100 mg Oral Daily RADHA Acevedo      calcium carbonate  500 mg Oral Daily PRN RADHA Acevedo      cholecalciferol  2,000 Units Oral Daily RADHA Acevedo      cyanocobalamin  1,000 mcg Oral Daily RADHA Acevedo      dabigatran etexilate  150 mg Oral BID RADHA Acevedo      digoxin  125 mcg Oral Daily Idania Atkins MD      furosemide  40 mg Oral BID RADHA Acevedo      gabapentin  300 mg Oral BID PRN RADHA Acevedo      levalbuterol  1 25 mg Nebulization Q6H PRN RADHA Acevedo      And    ipratropium  0 5 mg Nebulization Q6H PRN RADHA Acevedo      levothyroxine  25 mcg Oral Early Morning RADHA Acevedo      metoprolol tartrate  25 mg Oral Q12H 93970 Candi Peterson MD      pantoprazole  20 mg Oral Early Morning RADHA Acevedo      spironolactone  50 mg Oral Daily RADHA Acevedo          Today, Patient Was Seen By: Armando Frye PA-C    ** Please Note: Dictation voice to text software may have been used in the creation of this document   **

## 2021-07-08 NOTE — ASSESSMENT & PLAN NOTE
Lab Results   Component Value Date    HGBA1C 5 3 06/30/2021       Recent Labs     07/07/21  1552 07/07/21  2109 07/08/21  0708 07/08/21  1144   POCGLU 115 169* 98 108       Blood Sugar Average: Last 72 hrs:  (P) 768 [id number]   · A1c not in diabetic range, Glucose levels adequately controlled

## 2021-07-08 NOTE — ASSESSMENT & PLAN NOTE
Wt Readings from Last 3 Encounters:   07/07/21 (!) 223 kg (490 lb 15 4 oz)   07/03/21 (!) 239 kg (526 lb)   05/16/21 (!) 239 kg (526 lb 14 4 oz)     · As evidenced by elevated BNP and acute on chronic hypoxic respiratory failure with CXR showing vascular congestion  · Net negative 4+ liters, responded well to IV diuresis, now adjusted back to home regimen  · Monitored I/O, lytes, weight

## 2021-07-09 LAB
BACTERIA BLD CULT: NORMAL
BACTERIA BLD CULT: NORMAL

## 2021-07-14 NOTE — UTILIZATION REVIEW
Notification of Discharge   This is a Notification of Discharge from our facility [address]  Please be advised that this patient has been discharge from our facility  Below you will find the admission and discharge date and time including the patients disposition  UTILIZATION REVIEW CONTACT:  Riaz Hope  Utilization   Network Utilization Review Department  Phone: [phone] x carefully listen to the prompts  All voicemails are confidential   Email: [email or web address]     PHYSICIAN ADVISORY SERVICES:  FOR [id number] REVIEW - MEDICAL NECESSITY DENIAL  Phone: [phone]  Fax: [phone]  Email: [email or web address]     PRESENTATION DATE: 7/4/2021  4:06 AM  OBERVATION ADMISSION DATE:   INPATIENT ADMISSION DATE: 7/4/21  4:06 AM   DISCHARGE DATE: 7/8/2021  2:32 PM  DISPOSITION: Non [facility]/SNU Non [facility] SNF/TCU/SNU      IMPORTANT INFORMATION:  Send all requests for admission clinical reviews, approved or denied determinations and any other requests to dedicated fax number below belonging to the campus where the patient is receiving treatment   List of dedicated fax numbers:  [address] [place] [address] DENIALS (Administrative/Medical Necessity) [phone]   [address] (Maternity/NICU/Pediatrics) [phone]   Basil Sigala [phone]   Bevin Angelucci [phone]   Angy Tafoya [phone]   Vee Moreno [facility] [address] [facility] [phone]   [facility]  [phone]   [address] [place] [address], [address]  [address] [facility] [address] [facility] [phone]

## 2021-07-23 NOTE — ASSESSMENT & PLAN NOTE
· H/o DVT and PE, s/p IVC filter insertion in 2019   · Patient was supposed to have the filter removed, but could not, was referred to [facility] but he did not have that done as he switched insurance   · Is on anticoagulation with Pradaxa for Afib     · PLAN:   Continue anticoagulation with Pradaxa (1) Oriented to own ability

## 2021-11-07 ENCOUNTER — APPOINTMENT (EMERGENCY)
Dept: RADIOLOGY | Facility: HOSPITAL | Age: 51
DRG: 194 | End: 2021-11-07
Payer: COMMERCIAL

## 2021-11-07 ENCOUNTER — HOSPITAL ENCOUNTER (INPATIENT)
Facility: HOSPITAL | Age: 51
LOS: 8 days | Discharge: NURSING, REHAB OR LONG-TERM CARE | DRG: 194 | End: 2021-11-15
Attending: EMERGENCY MEDICINE | Admitting: INTERNAL MEDICINE
Payer: COMMERCIAL

## 2021-11-07 DIAGNOSIS — R07.9 CHEST PAIN: Primary | ICD-10-CM

## 2021-11-07 DIAGNOSIS — R09.81 NASAL CONGESTION: ICD-10-CM

## 2021-11-07 DIAGNOSIS — E66.2 OBESITY HYPOVENTILATION SYNDROME (HCC): ICD-10-CM

## 2021-11-07 DIAGNOSIS — I48.91 ATRIAL FIBRILLATION WITH RVR (HCC): ICD-10-CM

## 2021-11-07 DIAGNOSIS — I50.33 ACUTE ON CHRONIC DIASTOLIC CONGESTIVE HEART FAILURE (HCC): ICD-10-CM

## 2021-11-07 DIAGNOSIS — E66.01 MORBID OBESITY (HCC): ICD-10-CM

## 2021-11-07 DIAGNOSIS — I50.9 CHF (CONGESTIVE HEART FAILURE) (HCC): ICD-10-CM

## 2021-11-07 LAB
ALBUMIN SERPL BCP-MCNC: 3.4 G/DL (ref 3.5–5)
ALP SERPL-CCNC: 88 U/L (ref 46–116)
ALT SERPL W P-5'-P-CCNC: 24 U/L (ref 12–78)
ANION GAP SERPL CALCULATED.3IONS-SCNC: 0 MMOL/L (ref 4–13)
APTT PPP: 41 SECONDS (ref 23–37)
AST SERPL W P-5'-P-CCNC: 21 U/L (ref 5–45)
ATRIAL RATE: 125 BPM
ATRIAL RATE: 163 BPM
BASOPHILS # BLD AUTO: 0.03 THOUSANDS/ΜL (ref 0–0.1)
BASOPHILS NFR BLD AUTO: 0 % (ref 0–1)
BILIRUB SERPL-MCNC: 0.65 MG/DL (ref 0.2–1)
BILIRUB UR QL STRIP: NEGATIVE
BUN SERPL-MCNC: 17 MG/DL (ref 5–25)
CALCIUM ALBUM COR SERPL-MCNC: 9.3 MG/DL (ref 8.3–10.1)
CALCIUM SERPL-MCNC: 8.8 MG/DL (ref 8.3–10.1)
CHLORIDE SERPL-SCNC: 100 MMOL/L (ref 100–108)
CLARITY UR: CLEAR
CO2 SERPL-SCNC: 38 MMOL/L (ref 21–32)
COLOR UR: YELLOW
CREAT SERPL-MCNC: 1.13 MG/DL (ref 0.6–1.3)
EOSINOPHIL # BLD AUTO: 0.12 THOUSAND/ΜL (ref 0–0.61)
EOSINOPHIL NFR BLD AUTO: 1 % (ref 0–6)
ERYTHROCYTE [DISTWIDTH] IN BLOOD BY AUTOMATED COUNT: 13.7 % (ref 11.6–15.1)
GFR SERPL CREATININE-BSD FRML MDRD: 75 ML/MIN/1.73SQ M
GLUCOSE SERPL-MCNC: 109 MG/DL (ref 65–140)
GLUCOSE UR STRIP-MCNC: NEGATIVE MG/DL
HCT VFR BLD AUTO: 41.7 % (ref 36.5–49.3)
HGB BLD-MCNC: 13.6 G/DL (ref 12–17)
HGB UR QL STRIP.AUTO: NEGATIVE
IMM GRANULOCYTES # BLD AUTO: 0.06 THOUSAND/UL (ref 0–0.2)
IMM GRANULOCYTES NFR BLD AUTO: 1 % (ref 0–2)
INR PPP: 1.1 (ref 0.84–1.19)
KETONES UR STRIP-MCNC: NEGATIVE MG/DL
LEUKOCYTE ESTERASE UR QL STRIP: NEGATIVE
LYMPHOCYTES # BLD AUTO: 1.23 THOUSANDS/ΜL (ref 0.6–4.47)
LYMPHOCYTES NFR BLD AUTO: 13 % (ref 14–44)
MCH RBC QN AUTO: 30 PG (ref 26.8–34.3)
MCHC RBC AUTO-ENTMCNC: 32.6 G/DL (ref 31.4–37.4)
MCV RBC AUTO: 92 FL (ref 82–98)
MONOCYTES # BLD AUTO: 0.56 THOUSAND/ΜL (ref 0.17–1.22)
MONOCYTES NFR BLD AUTO: 6 % (ref 4–12)
NEUTROPHILS # BLD AUTO: 7.25 THOUSANDS/ΜL (ref 1.85–7.62)
NEUTS SEG NFR BLD AUTO: 79 % (ref 43–75)
NITRITE UR QL STRIP: NEGATIVE
NRBC BLD AUTO-RTO: 0 /100 WBCS
NT-PROBNP SERPL-MCNC: 1058 PG/ML
P AXIS: 25 DEGREES
PH UR STRIP.AUTO: 6.5 [PH]
PLATELET # BLD AUTO: 224 THOUSANDS/UL (ref 149–390)
PMV BLD AUTO: 10.1 FL (ref 8.9–12.7)
POTASSIUM SERPL-SCNC: 4.2 MMOL/L (ref 3.5–5.3)
PROT SERPL-MCNC: 7.6 G/DL (ref 6.4–8.2)
PROT UR STRIP-MCNC: NEGATIVE MG/DL
PROTHROMBIN TIME: 14.2 SECONDS (ref 11.6–14.5)
QRS AXIS: 30 DEGREES
QRS AXIS: 43 DEGREES
QRSD INTERVAL: 88 MS
QRSD INTERVAL: 90 MS
QT INTERVAL: 280 MS
QT INTERVAL: 338 MS
QTC INTERVAL: 399 MS
QTC INTERVAL: 477 MS
RBC # BLD AUTO: 4.54 MILLION/UL (ref 3.88–5.62)
SODIUM SERPL-SCNC: 138 MMOL/L (ref 136–145)
SP GR UR STRIP.AUTO: 1.01 (ref 1–1.03)
T WAVE AXIS: -1 DEGREES
T WAVE AXIS: 16 DEGREES
TROPONIN I SERPL-MCNC: 0.07 NG/ML
TROPONIN I SERPL-MCNC: 0.13 NG/ML
TROPONIN I SERPL-MCNC: 0.23 NG/ML
TROPONIN I SERPL-MCNC: <0.02 NG/ML
UROBILINOGEN UR QL STRIP.AUTO: 0.2 E.U./DL
VENTRICULAR RATE: 120 BPM
VENTRICULAR RATE: 122 BPM
WBC # BLD AUTO: 9.25 THOUSAND/UL (ref 4.31–10.16)

## 2021-11-07 PROCEDURE — 94640 AIRWAY INHALATION TREATMENT: CPT

## 2021-11-07 PROCEDURE — 96374 THER/PROPH/DIAG INJ IV PUSH: CPT

## 2021-11-07 PROCEDURE — 85610 PROTHROMBIN TIME: CPT | Performed by: EMERGENCY MEDICINE

## 2021-11-07 PROCEDURE — 94660 CPAP INITIATION&MGMT: CPT

## 2021-11-07 PROCEDURE — 93010 ELECTROCARDIOGRAM REPORT: CPT | Performed by: INTERNAL MEDICINE

## 2021-11-07 PROCEDURE — 80053 COMPREHEN METABOLIC PANEL: CPT | Performed by: EMERGENCY MEDICINE

## 2021-11-07 PROCEDURE — 94760 N-INVAS EAR/PLS OXIMETRY 1: CPT

## 2021-11-07 PROCEDURE — 84484 ASSAY OF TROPONIN QUANT: CPT | Performed by: INTERNAL MEDICINE

## 2021-11-07 PROCEDURE — 84484 ASSAY OF TROPONIN QUANT: CPT | Performed by: EMERGENCY MEDICINE

## 2021-11-07 PROCEDURE — 84484 ASSAY OF TROPONIN QUANT: CPT | Performed by: PHYSICIAN ASSISTANT

## 2021-11-07 PROCEDURE — 85730 THROMBOPLASTIN TIME PARTIAL: CPT | Performed by: EMERGENCY MEDICINE

## 2021-11-07 PROCEDURE — 99223 1ST HOSP IP/OBS HIGH 75: CPT | Performed by: INTERNAL MEDICINE

## 2021-11-07 PROCEDURE — 85025 COMPLETE CBC W/AUTO DIFF WBC: CPT | Performed by: EMERGENCY MEDICINE

## 2021-11-07 PROCEDURE — 96365 THER/PROPH/DIAG IV INF INIT: CPT

## 2021-11-07 PROCEDURE — 81003 URINALYSIS AUTO W/O SCOPE: CPT | Performed by: PHYSICIAN ASSISTANT

## 2021-11-07 PROCEDURE — 71045 X-RAY EXAM CHEST 1 VIEW: CPT

## 2021-11-07 PROCEDURE — 94002 VENT MGMT INPAT INIT DAY: CPT

## 2021-11-07 PROCEDURE — 93005 ELECTROCARDIOGRAM TRACING: CPT

## 2021-11-07 PROCEDURE — 99291 CRITICAL CARE FIRST HOUR: CPT | Performed by: EMERGENCY MEDICINE

## 2021-11-07 PROCEDURE — 96375 TX/PRO/DX INJ NEW DRUG ADDON: CPT

## 2021-11-07 PROCEDURE — 99291 CRITICAL CARE FIRST HOUR: CPT

## 2021-11-07 PROCEDURE — 36415 COLL VENOUS BLD VENIPUNCTURE: CPT | Performed by: EMERGENCY MEDICINE

## 2021-11-07 PROCEDURE — 83880 ASSAY OF NATRIURETIC PEPTIDE: CPT | Performed by: EMERGENCY MEDICINE

## 2021-11-07 RX ORDER — SPIRONOLACTONE 25 MG/1
50 TABLET ORAL DAILY
Status: DISCONTINUED | OUTPATIENT
Start: 2021-11-08 | End: 2021-11-15 | Stop reason: HOSPADM

## 2021-11-07 RX ORDER — IPRATROPIUM BROMIDE AND ALBUTEROL SULFATE 2.5; .5 MG/3ML; MG/3ML
3 SOLUTION RESPIRATORY (INHALATION) 4 TIMES DAILY
Status: DISCONTINUED | OUTPATIENT
Start: 2021-11-07 | End: 2021-11-07

## 2021-11-07 RX ORDER — DILTIAZEM HYDROCHLORIDE 5 MG/ML
15 INJECTION INTRAVENOUS ONCE
Status: COMPLETED | OUTPATIENT
Start: 2021-11-07 | End: 2021-11-07

## 2021-11-07 RX ORDER — ACETAMINOPHEN 325 MG/1
650 TABLET ORAL EVERY 6 HOURS PRN
Status: DISCONTINUED | OUTPATIENT
Start: 2021-11-07 | End: 2021-11-15 | Stop reason: HOSPADM

## 2021-11-07 RX ORDER — FUROSEMIDE 40 MG/1
40 TABLET ORAL 2 TIMES DAILY
Status: DISCONTINUED | OUTPATIENT
Start: 2021-11-07 | End: 2021-11-08

## 2021-11-07 RX ORDER — FUROSEMIDE 10 MG/ML
60 INJECTION INTRAMUSCULAR; INTRAVENOUS ONCE
Status: COMPLETED | OUTPATIENT
Start: 2021-11-07 | End: 2021-11-07

## 2021-11-07 RX ORDER — OXYCODONE HYDROCHLORIDE 5 MG/1
5 TABLET ORAL EVERY 4 HOURS PRN
Status: DISCONTINUED | OUTPATIENT
Start: 2021-11-07 | End: 2021-11-15 | Stop reason: HOSPADM

## 2021-11-07 RX ORDER — ONDANSETRON 2 MG/ML
1 INJECTION INTRAMUSCULAR; INTRAVENOUS ONCE
Status: COMPLETED | OUTPATIENT
Start: 2021-11-07 | End: 2021-11-07

## 2021-11-07 RX ORDER — DIGOXIN 125 MCG
125 TABLET ORAL DAILY
Status: DISCONTINUED | OUTPATIENT
Start: 2021-11-08 | End: 2021-11-15 | Stop reason: HOSPADM

## 2021-11-07 RX ORDER — BUDESONIDE AND FORMOTEROL FUMARATE DIHYDRATE 160; 4.5 UG/1; UG/1
2 AEROSOL RESPIRATORY (INHALATION) 2 TIMES DAILY
Status: DISCONTINUED | OUTPATIENT
Start: 2021-11-07 | End: 2021-11-15 | Stop reason: HOSPADM

## 2021-11-07 RX ORDER — PANTOPRAZOLE SODIUM 20 MG/1
20 TABLET, DELAYED RELEASE ORAL
Status: DISCONTINUED | OUTPATIENT
Start: 2021-11-08 | End: 2021-11-15 | Stop reason: HOSPADM

## 2021-11-07 RX ORDER — METOPROLOL TARTRATE 50 MG/1
50 TABLET, FILM COATED ORAL EVERY 12 HOURS SCHEDULED
Status: DISCONTINUED | OUTPATIENT
Start: 2021-11-07 | End: 2021-11-15 | Stop reason: HOSPADM

## 2021-11-07 RX ORDER — DABIGATRAN ETEXILATE 150 MG/1
150 CAPSULE, COATED PELLETS ORAL 2 TIMES DAILY
Status: DISCONTINUED | OUTPATIENT
Start: 2021-11-07 | End: 2021-11-15 | Stop reason: HOSPADM

## 2021-11-07 RX ORDER — MELATONIN
2000 DAILY
Status: DISCONTINUED | OUTPATIENT
Start: 2021-11-08 | End: 2021-11-15 | Stop reason: HOSPADM

## 2021-11-07 RX ORDER — BUPROPION HYDROCHLORIDE 150 MG/1
450 TABLET ORAL DAILY
Status: DISCONTINUED | OUTPATIENT
Start: 2021-11-08 | End: 2021-11-15 | Stop reason: HOSPADM

## 2021-11-07 RX ORDER — GABAPENTIN 300 MG/1
300 CAPSULE ORAL 2 TIMES DAILY PRN
Status: DISCONTINUED | OUTPATIENT
Start: 2021-11-07 | End: 2021-11-15 | Stop reason: HOSPADM

## 2021-11-07 RX ORDER — POTASSIUM CHLORIDE 20 MEQ/1
20 TABLET, EXTENDED RELEASE ORAL 2 TIMES DAILY
Status: CANCELLED | OUTPATIENT
Start: 2021-11-07

## 2021-11-07 RX ORDER — LEVOTHYROXINE SODIUM 0.03 MG/1
25 TABLET ORAL
Status: DISCONTINUED | OUTPATIENT
Start: 2021-11-08 | End: 2021-11-15 | Stop reason: HOSPADM

## 2021-11-07 RX ORDER — LEVALBUTEROL 1.25 MG/.5ML
1.25 SOLUTION, CONCENTRATE RESPIRATORY (INHALATION)
Status: DISCONTINUED | OUTPATIENT
Start: 2021-11-07 | End: 2021-11-15 | Stop reason: HOSPADM

## 2021-11-07 RX ORDER — ONDANSETRON 2 MG/ML
4 INJECTION INTRAMUSCULAR; INTRAVENOUS EVERY 6 HOURS PRN
Status: DISCONTINUED | OUTPATIENT
Start: 2021-11-07 | End: 2021-11-15 | Stop reason: HOSPADM

## 2021-11-07 RX ORDER — ASPIRIN 81 MG/1
324 TABLET, CHEWABLE ORAL ONCE
Status: COMPLETED | OUTPATIENT
Start: 2021-11-07 | End: 2021-11-07

## 2021-11-07 RX ADMIN — FUROSEMIDE 40 MG: 40 TABLET ORAL at 21:07

## 2021-11-07 RX ADMIN — IPRATROPIUM BROMIDE AND ALBUTEROL SULFATE 3 ML: 2.5; .5 SOLUTION RESPIRATORY (INHALATION) at 14:46

## 2021-11-07 RX ADMIN — DILTIAZEM HYDROCHLORIDE 5 MG/HR: 5 INJECTION INTRAVENOUS at 11:51

## 2021-11-07 RX ADMIN — BUDESONIDE AND FORMOTEROL FUMARATE DIHYDRATE 2 PUFF: 160; 4.5 AEROSOL RESPIRATORY (INHALATION) at 21:05

## 2021-11-07 RX ADMIN — OXYCODONE HYDROCHLORIDE 5 MG: 5 TABLET ORAL at 21:07

## 2021-11-07 RX ADMIN — METOPROLOL TARTRATE 50 MG: 50 TABLET, FILM COATED ORAL at 21:07

## 2021-11-07 RX ADMIN — LEVALBUTEROL HYDROCHLORIDE 1.25 MG: 1.25 SOLUTION, CONCENTRATE RESPIRATORY (INHALATION) at 20:53

## 2021-11-07 RX ADMIN — DILTIAZEM HYDROCHLORIDE 15 MG: 5 INJECTION INTRAVENOUS at 12:22

## 2021-11-07 RX ADMIN — IPRATROPIUM BROMIDE 0.5 MG: 0.5 SOLUTION RESPIRATORY (INHALATION) at 20:53

## 2021-11-07 RX ADMIN — FUROSEMIDE 60 MG: 10 INJECTION, SOLUTION INTRAMUSCULAR; INTRAVENOUS at 11:46

## 2021-11-07 RX ADMIN — GABAPENTIN 300 MG: 300 CAPSULE ORAL at 14:40

## 2021-11-07 RX ADMIN — DABIGATRAN ETEXILATE MESYLATE 150 MG: 150 CAPSULE ORAL at 21:07

## 2021-11-07 RX ADMIN — OXYCODONE HYDROCHLORIDE 5 MG: 5 TABLET ORAL at 14:00

## 2021-11-08 PROBLEM — R77.8 ELEVATED TROPONIN: Status: ACTIVE | Noted: 2021-01-01

## 2021-11-08 LAB
ANION GAP SERPL CALCULATED.3IONS-SCNC: 3 MMOL/L (ref 4–13)
BUN SERPL-MCNC: 21 MG/DL (ref 5–25)
CALCIUM SERPL-MCNC: 8.9 MG/DL (ref 8.3–10.1)
CHLORIDE SERPL-SCNC: 99 MMOL/L (ref 100–108)
CO2 SERPL-SCNC: 38 MMOL/L (ref 21–32)
CREAT SERPL-MCNC: 1.18 MG/DL (ref 0.6–1.3)
ERYTHROCYTE [DISTWIDTH] IN BLOOD BY AUTOMATED COUNT: 13.8 % (ref 11.6–15.1)
GFR SERPL CREATININE-BSD FRML MDRD: 72 ML/MIN/1.73SQ M
GLUCOSE SERPL-MCNC: 122 MG/DL (ref 65–140)
HCT VFR BLD AUTO: 40.1 % (ref 36.5–49.3)
HGB BLD-MCNC: 12.5 G/DL (ref 12–17)
MAGNESIUM SERPL-MCNC: 2.3 MG/DL (ref 1.6–2.6)
MCH RBC QN AUTO: 29 PG (ref 26.8–34.3)
MCHC RBC AUTO-ENTMCNC: 31.2 G/DL (ref 31.4–37.4)
MCV RBC AUTO: 93 FL (ref 82–98)
PLATELET # BLD AUTO: 198 THOUSANDS/UL (ref 149–390)
PMV BLD AUTO: 9.9 FL (ref 8.9–12.7)
POTASSIUM SERPL-SCNC: 3.9 MMOL/L (ref 3.5–5.3)
PROCALCITONIN SERPL-MCNC: <0.05 NG/ML
RBC # BLD AUTO: 4.31 MILLION/UL (ref 3.88–5.62)
SODIUM SERPL-SCNC: 140 MMOL/L (ref 136–145)
TROPONIN I SERPL-MCNC: 0.25 NG/ML
TROPONIN I SERPL-MCNC: 0.25 NG/ML
WBC # BLD AUTO: 7.9 THOUSAND/UL (ref 4.31–10.16)

## 2021-11-08 PROCEDURE — 80048 BASIC METABOLIC PNL TOTAL CA: CPT | Performed by: PHYSICIAN ASSISTANT

## 2021-11-08 PROCEDURE — 85027 COMPLETE CBC AUTOMATED: CPT | Performed by: PHYSICIAN ASSISTANT

## 2021-11-08 PROCEDURE — 99232 SBSQ HOSP IP/OBS MODERATE 35: CPT | Performed by: PHYSICIAN ASSISTANT

## 2021-11-08 PROCEDURE — 94760 N-INVAS EAR/PLS OXIMETRY 1: CPT

## 2021-11-08 PROCEDURE — 94660 CPAP INITIATION&MGMT: CPT

## 2021-11-08 PROCEDURE — 99223 1ST HOSP IP/OBS HIGH 75: CPT | Performed by: INTERNAL MEDICINE

## 2021-11-08 PROCEDURE — 84145 PROCALCITONIN (PCT): CPT | Performed by: PHYSICIAN ASSISTANT

## 2021-11-08 PROCEDURE — 94640 AIRWAY INHALATION TREATMENT: CPT

## 2021-11-08 PROCEDURE — 36415 COLL VENOUS BLD VENIPUNCTURE: CPT | Performed by: INTERNAL MEDICINE

## 2021-11-08 PROCEDURE — 94003 VENT MGMT INPAT SUBQ DAY: CPT

## 2021-11-08 PROCEDURE — 84484 ASSAY OF TROPONIN QUANT: CPT | Performed by: INTERNAL MEDICINE

## 2021-11-08 PROCEDURE — 83735 ASSAY OF MAGNESIUM: CPT | Performed by: PHYSICIAN ASSISTANT

## 2021-11-08 RX ORDER — FUROSEMIDE 10 MG/ML
40 INJECTION INTRAMUSCULAR; INTRAVENOUS 3 TIMES DAILY
Status: DISCONTINUED | OUTPATIENT
Start: 2021-11-08 | End: 2021-11-14

## 2021-11-08 RX ORDER — FUROSEMIDE 40 MG/1
40 TABLET ORAL
Status: DISCONTINUED | OUTPATIENT
Start: 2021-11-08 | End: 2021-11-08

## 2021-11-08 RX ORDER — FUROSEMIDE 10 MG/ML
40 INJECTION INTRAMUSCULAR; INTRAVENOUS
Status: DISCONTINUED | OUTPATIENT
Start: 2021-11-08 | End: 2021-11-08

## 2021-11-08 RX ADMIN — DIGOXIN 125 MCG: 125 TABLET ORAL at 10:34

## 2021-11-08 RX ADMIN — BUPROPION HYDROCHLORIDE 450 MG: 150 TABLET, EXTENDED RELEASE ORAL at 10:33

## 2021-11-08 RX ADMIN — PANTOPRAZOLE SODIUM 20 MG: 20 TABLET, DELAYED RELEASE ORAL at 06:29

## 2021-11-08 RX ADMIN — BUDESONIDE AND FORMOTEROL FUMARATE DIHYDRATE 2 PUFF: 160; 4.5 AEROSOL RESPIRATORY (INHALATION) at 16:54

## 2021-11-08 RX ADMIN — FUROSEMIDE 40 MG: 10 INJECTION, SOLUTION INTRAMUSCULAR; INTRAVENOUS at 16:54

## 2021-11-08 RX ADMIN — DABIGATRAN ETEXILATE MESYLATE 150 MG: 150 CAPSULE ORAL at 16:54

## 2021-11-08 RX ADMIN — FUROSEMIDE 40 MG: 10 INJECTION, SOLUTION INTRAMUSCULAR; INTRAVENOUS at 10:34

## 2021-11-08 RX ADMIN — IPRATROPIUM BROMIDE 0.5 MG: 0.5 SOLUTION RESPIRATORY (INHALATION) at 14:30

## 2021-11-08 RX ADMIN — LEVALBUTEROL HYDROCHLORIDE 1.25 MG: 1.25 SOLUTION, CONCENTRATE RESPIRATORY (INHALATION) at 14:30

## 2021-11-08 RX ADMIN — LEVOTHYROXINE SODIUM 25 MCG: 25 TABLET ORAL at 06:29

## 2021-11-08 RX ADMIN — LEVALBUTEROL HYDROCHLORIDE 1.25 MG: 1.25 SOLUTION, CONCENTRATE RESPIRATORY (INHALATION) at 20:34

## 2021-11-08 RX ADMIN — METOPROLOL TARTRATE 50 MG: 50 TABLET, FILM COATED ORAL at 22:04

## 2021-11-08 RX ADMIN — BUDESONIDE AND FORMOTEROL FUMARATE DIHYDRATE 2 PUFF: 160; 4.5 AEROSOL RESPIRATORY (INHALATION) at 10:33

## 2021-11-08 RX ADMIN — DABIGATRAN ETEXILATE MESYLATE 150 MG: 150 CAPSULE ORAL at 10:32

## 2021-11-08 RX ADMIN — LEVALBUTEROL HYDROCHLORIDE 1.25 MG: 1.25 SOLUTION, CONCENTRATE RESPIRATORY (INHALATION) at 10:36

## 2021-11-08 RX ADMIN — FUROSEMIDE 40 MG: 10 INJECTION, SOLUTION INTRAMUSCULAR; INTRAVENOUS at 22:04

## 2021-11-08 RX ADMIN — SPIRONOLACTONE 50 MG: 25 TABLET ORAL at 10:33

## 2021-11-08 RX ADMIN — GABAPENTIN 300 MG: 300 CAPSULE ORAL at 10:34

## 2021-11-08 RX ADMIN — METOPROLOL TARTRATE 50 MG: 50 TABLET, FILM COATED ORAL at 10:33

## 2021-11-08 RX ADMIN — Medication 2000 UNITS: at 10:33

## 2021-11-08 RX ADMIN — DILTIAZEM HYDROCHLORIDE 10 MG/HR: 5 INJECTION INTRAVENOUS at 00:27

## 2021-11-08 RX ADMIN — IPRATROPIUM BROMIDE 0.5 MG: 0.5 SOLUTION RESPIRATORY (INHALATION) at 10:38

## 2021-11-08 RX ADMIN — IPRATROPIUM BROMIDE 0.5 MG: 0.5 SOLUTION RESPIRATORY (INHALATION) at 20:34

## 2021-11-09 PROBLEM — K43.9 VENTRAL HERNIA WITHOUT OBSTRUCTION OR GANGRENE: Status: ACTIVE | Noted: 2021-11-09

## 2021-11-09 LAB
ANION GAP SERPL CALCULATED.3IONS-SCNC: 4 MMOL/L (ref 4–13)
BUN SERPL-MCNC: 21 MG/DL (ref 5–25)
CALCIUM SERPL-MCNC: 8.7 MG/DL (ref 8.3–10.1)
CHLORIDE SERPL-SCNC: 99 MMOL/L (ref 100–108)
CO2 SERPL-SCNC: 36 MMOL/L (ref 21–32)
CREAT SERPL-MCNC: 1 MG/DL (ref 0.6–1.3)
GFR SERPL CREATININE-BSD FRML MDRD: 87 ML/MIN/1.73SQ M
GLUCOSE SERPL-MCNC: 118 MG/DL (ref 65–140)
POTASSIUM SERPL-SCNC: 3.7 MMOL/L (ref 3.5–5.3)
PROCALCITONIN SERPL-MCNC: <0.05 NG/ML
SODIUM SERPL-SCNC: 139 MMOL/L (ref 136–145)

## 2021-11-09 PROCEDURE — 99232 SBSQ HOSP IP/OBS MODERATE 35: CPT | Performed by: PHYSICIAN ASSISTANT

## 2021-11-09 PROCEDURE — 94640 AIRWAY INHALATION TREATMENT: CPT

## 2021-11-09 PROCEDURE — 84145 PROCALCITONIN (PCT): CPT | Performed by: PHYSICIAN ASSISTANT

## 2021-11-09 PROCEDURE — 94760 N-INVAS EAR/PLS OXIMETRY 1: CPT

## 2021-11-09 PROCEDURE — 80048 BASIC METABOLIC PNL TOTAL CA: CPT | Performed by: PHYSICIAN ASSISTANT

## 2021-11-09 PROCEDURE — 94003 VENT MGMT INPAT SUBQ DAY: CPT

## 2021-11-09 PROCEDURE — 99232 SBSQ HOSP IP/OBS MODERATE 35: CPT | Performed by: INTERNAL MEDICINE

## 2021-11-09 PROCEDURE — 94660 CPAP INITIATION&MGMT: CPT

## 2021-11-09 RX ORDER — POTASSIUM CHLORIDE 20 MEQ/1
20 TABLET, EXTENDED RELEASE ORAL 2 TIMES DAILY
Status: COMPLETED | OUTPATIENT
Start: 2021-11-09 | End: 2021-11-09

## 2021-11-09 RX ORDER — DOCUSATE SODIUM 100 MG/1
100 CAPSULE, LIQUID FILLED ORAL 2 TIMES DAILY
Status: DISCONTINUED | OUTPATIENT
Start: 2021-11-09 | End: 2021-11-15 | Stop reason: HOSPADM

## 2021-11-09 RX ADMIN — BUDESONIDE AND FORMOTEROL FUMARATE DIHYDRATE 2 PUFF: 160; 4.5 AEROSOL RESPIRATORY (INHALATION) at 16:15

## 2021-11-09 RX ADMIN — LEVALBUTEROL HYDROCHLORIDE 1.25 MG: 1.25 SOLUTION, CONCENTRATE RESPIRATORY (INHALATION) at 08:37

## 2021-11-09 RX ADMIN — POTASSIUM CHLORIDE 20 MEQ: 1500 TABLET, EXTENDED RELEASE ORAL at 12:33

## 2021-11-09 RX ADMIN — Medication 2000 UNITS: at 08:09

## 2021-11-09 RX ADMIN — DOCUSATE SODIUM 100 MG: 100 CAPSULE ORAL at 08:08

## 2021-11-09 RX ADMIN — SPIRONOLACTONE 50 MG: 25 TABLET ORAL at 08:09

## 2021-11-09 RX ADMIN — FUROSEMIDE 40 MG: 10 INJECTION, SOLUTION INTRAMUSCULAR; INTRAVENOUS at 16:15

## 2021-11-09 RX ADMIN — LEVALBUTEROL HYDROCHLORIDE 1.25 MG: 1.25 SOLUTION, CONCENTRATE RESPIRATORY (INHALATION) at 14:02

## 2021-11-09 RX ADMIN — DABIGATRAN ETEXILATE MESYLATE 150 MG: 150 CAPSULE ORAL at 16:15

## 2021-11-09 RX ADMIN — FUROSEMIDE 40 MG: 10 INJECTION, SOLUTION INTRAMUSCULAR; INTRAVENOUS at 08:08

## 2021-11-09 RX ADMIN — FUROSEMIDE 40 MG: 10 INJECTION, SOLUTION INTRAMUSCULAR; INTRAVENOUS at 22:15

## 2021-11-09 RX ADMIN — IPRATROPIUM BROMIDE 0.5 MG: 0.5 SOLUTION RESPIRATORY (INHALATION) at 08:37

## 2021-11-09 RX ADMIN — DOCUSATE SODIUM 100 MG: 100 CAPSULE ORAL at 16:15

## 2021-11-09 RX ADMIN — METOPROLOL TARTRATE 50 MG: 50 TABLET, FILM COATED ORAL at 08:09

## 2021-11-09 RX ADMIN — BUPROPION HYDROCHLORIDE 450 MG: 150 TABLET, EXTENDED RELEASE ORAL at 08:08

## 2021-11-09 RX ADMIN — DIGOXIN 125 MCG: 125 TABLET ORAL at 08:08

## 2021-11-09 RX ADMIN — ACETAMINOPHEN 650 MG: 325 TABLET, FILM COATED ORAL at 22:35

## 2021-11-09 RX ADMIN — DABIGATRAN ETEXILATE MESYLATE 150 MG: 150 CAPSULE ORAL at 08:09

## 2021-11-09 RX ADMIN — BUDESONIDE AND FORMOTEROL FUMARATE DIHYDRATE 2 PUFF: 160; 4.5 AEROSOL RESPIRATORY (INHALATION) at 08:10

## 2021-11-09 RX ADMIN — PANTOPRAZOLE SODIUM 20 MG: 20 TABLET, DELAYED RELEASE ORAL at 06:32

## 2021-11-09 RX ADMIN — POTASSIUM CHLORIDE 20 MEQ: 1500 TABLET, EXTENDED RELEASE ORAL at 16:15

## 2021-11-09 RX ADMIN — IPRATROPIUM BROMIDE 0.5 MG: 0.5 SOLUTION RESPIRATORY (INHALATION) at 14:02

## 2021-11-09 RX ADMIN — METOPROLOL TARTRATE 50 MG: 50 TABLET, FILM COATED ORAL at 22:15

## 2021-11-09 RX ADMIN — IPRATROPIUM BROMIDE 0.5 MG: 0.5 SOLUTION RESPIRATORY (INHALATION) at 20:05

## 2021-11-09 RX ADMIN — LEVALBUTEROL HYDROCHLORIDE 1.25 MG: 1.25 SOLUTION, CONCENTRATE RESPIRATORY (INHALATION) at 20:05

## 2021-11-09 RX ADMIN — LEVOTHYROXINE SODIUM 25 MCG: 25 TABLET ORAL at 06:31

## 2021-11-10 LAB
ANION GAP SERPL CALCULATED.3IONS-SCNC: 2 MMOL/L (ref 4–13)
BUN SERPL-MCNC: 17 MG/DL (ref 5–25)
CALCIUM SERPL-MCNC: 8.6 MG/DL (ref 8.3–10.1)
CHLORIDE SERPL-SCNC: 100 MMOL/L (ref 100–108)
CO2 SERPL-SCNC: 38 MMOL/L (ref 21–32)
CREAT SERPL-MCNC: 0.97 MG/DL (ref 0.6–1.3)
GFR SERPL CREATININE-BSD FRML MDRD: 91 ML/MIN/1.73SQ M
GLUCOSE SERPL-MCNC: 108 MG/DL (ref 65–140)
POTASSIUM SERPL-SCNC: 3.4 MMOL/L (ref 3.5–5.3)
SODIUM SERPL-SCNC: 140 MMOL/L (ref 136–145)

## 2021-11-10 PROCEDURE — 94640 AIRWAY INHALATION TREATMENT: CPT

## 2021-11-10 PROCEDURE — 99232 SBSQ HOSP IP/OBS MODERATE 35: CPT | Performed by: PHYSICIAN ASSISTANT

## 2021-11-10 PROCEDURE — 99232 SBSQ HOSP IP/OBS MODERATE 35: CPT | Performed by: INTERNAL MEDICINE

## 2021-11-10 PROCEDURE — 80048 BASIC METABOLIC PNL TOTAL CA: CPT | Performed by: PHYSICIAN ASSISTANT

## 2021-11-10 PROCEDURE — 94760 N-INVAS EAR/PLS OXIMETRY 1: CPT

## 2021-11-10 PROCEDURE — 94003 VENT MGMT INPAT SUBQ DAY: CPT

## 2021-11-10 RX ORDER — NYSTATIN 100000 U/G
CREAM TOPICAL 2 TIMES DAILY
Status: DISCONTINUED | OUTPATIENT
Start: 2021-11-10 | End: 2021-11-15 | Stop reason: HOSPADM

## 2021-11-10 RX ORDER — METOLAZONE 5 MG/1
2.5 TABLET ORAL ONCE
Status: COMPLETED | OUTPATIENT
Start: 2021-11-10 | End: 2021-11-10

## 2021-11-10 RX ORDER — POTASSIUM CHLORIDE 20 MEQ/1
40 TABLET, EXTENDED RELEASE ORAL ONCE
Status: COMPLETED | OUTPATIENT
Start: 2021-11-10 | End: 2021-11-10

## 2021-11-10 RX ORDER — POLYETHYLENE GLYCOL 3350 17 G/17G
17 POWDER, FOR SOLUTION ORAL DAILY
Status: DISCONTINUED | OUTPATIENT
Start: 2021-11-10 | End: 2021-11-15 | Stop reason: HOSPADM

## 2021-11-10 RX ORDER — POTASSIUM CHLORIDE 20 MEQ/1
20 TABLET, EXTENDED RELEASE ORAL ONCE
Status: COMPLETED | OUTPATIENT
Start: 2021-11-10 | End: 2021-11-10

## 2021-11-10 RX ORDER — POTASSIUM CHLORIDE 20 MEQ/1
20 TABLET, EXTENDED RELEASE ORAL 2 TIMES DAILY
Status: DISCONTINUED | OUTPATIENT
Start: 2021-11-10 | End: 2021-11-10

## 2021-11-10 RX ADMIN — ACETAMINOPHEN 650 MG: 325 TABLET, FILM COATED ORAL at 16:36

## 2021-11-10 RX ADMIN — IPRATROPIUM BROMIDE 0.5 MG: 0.5 SOLUTION RESPIRATORY (INHALATION) at 19:48

## 2021-11-10 RX ADMIN — ACETAMINOPHEN 650 MG: 325 TABLET, FILM COATED ORAL at 21:51

## 2021-11-10 RX ADMIN — DOCUSATE SODIUM 100 MG: 100 CAPSULE ORAL at 08:28

## 2021-11-10 RX ADMIN — LEVALBUTEROL HYDROCHLORIDE 1.25 MG: 1.25 SOLUTION, CONCENTRATE RESPIRATORY (INHALATION) at 13:41

## 2021-11-10 RX ADMIN — DABIGATRAN ETEXILATE MESYLATE 150 MG: 150 CAPSULE ORAL at 08:29

## 2021-11-10 RX ADMIN — METOLAZONE 2.5 MG: 5 TABLET ORAL at 14:50

## 2021-11-10 RX ADMIN — IPRATROPIUM BROMIDE 0.5 MG: 0.5 SOLUTION RESPIRATORY (INHALATION) at 07:59

## 2021-11-10 RX ADMIN — DABIGATRAN ETEXILATE MESYLATE 150 MG: 150 CAPSULE ORAL at 16:37

## 2021-11-10 RX ADMIN — LEVALBUTEROL HYDROCHLORIDE 1.25 MG: 1.25 SOLUTION, CONCENTRATE RESPIRATORY (INHALATION) at 07:59

## 2021-11-10 RX ADMIN — LEVOTHYROXINE SODIUM 25 MCG: 25 TABLET ORAL at 06:02

## 2021-11-10 RX ADMIN — Medication 2000 UNITS: at 08:29

## 2021-11-10 RX ADMIN — FUROSEMIDE 40 MG: 10 INJECTION, SOLUTION INTRAMUSCULAR; INTRAVENOUS at 21:27

## 2021-11-10 RX ADMIN — FUROSEMIDE 40 MG: 10 INJECTION, SOLUTION INTRAMUSCULAR; INTRAVENOUS at 08:28

## 2021-11-10 RX ADMIN — METOPROLOL TARTRATE 50 MG: 50 TABLET, FILM COATED ORAL at 21:27

## 2021-11-10 RX ADMIN — DOCUSATE SODIUM 100 MG: 100 CAPSULE ORAL at 16:37

## 2021-11-10 RX ADMIN — PANTOPRAZOLE SODIUM 20 MG: 20 TABLET, DELAYED RELEASE ORAL at 06:02

## 2021-11-10 RX ADMIN — BUDESONIDE AND FORMOTEROL FUMARATE DIHYDRATE 2 PUFF: 160; 4.5 AEROSOL RESPIRATORY (INHALATION) at 16:36

## 2021-11-10 RX ADMIN — BUDESONIDE AND FORMOTEROL FUMARATE DIHYDRATE 2 PUFF: 160; 4.5 AEROSOL RESPIRATORY (INHALATION) at 08:31

## 2021-11-10 RX ADMIN — POTASSIUM CHLORIDE 20 MEQ: 1500 TABLET, EXTENDED RELEASE ORAL at 14:50

## 2021-11-10 RX ADMIN — POLYETHYLENE GLYCOL 3350 17 G: 17 POWDER, FOR SOLUTION ORAL at 13:52

## 2021-11-10 RX ADMIN — NYSTATIN: 100000 CREAM TOPICAL at 13:52

## 2021-11-10 RX ADMIN — IPRATROPIUM BROMIDE 0.5 MG: 0.5 SOLUTION RESPIRATORY (INHALATION) at 13:41

## 2021-11-10 RX ADMIN — NYSTATIN: 100000 CREAM TOPICAL at 16:37

## 2021-11-10 RX ADMIN — FUROSEMIDE 40 MG: 10 INJECTION, SOLUTION INTRAMUSCULAR; INTRAVENOUS at 16:36

## 2021-11-10 RX ADMIN — DIGOXIN 125 MCG: 125 TABLET ORAL at 08:28

## 2021-11-10 RX ADMIN — METOPROLOL TARTRATE 50 MG: 50 TABLET, FILM COATED ORAL at 08:29

## 2021-11-10 RX ADMIN — SPIRONOLACTONE 50 MG: 25 TABLET ORAL at 08:29

## 2021-11-10 RX ADMIN — LEVALBUTEROL HYDROCHLORIDE 1.25 MG: 1.25 SOLUTION, CONCENTRATE RESPIRATORY (INHALATION) at 19:48

## 2021-11-10 RX ADMIN — BUPROPION HYDROCHLORIDE 450 MG: 150 TABLET, EXTENDED RELEASE ORAL at 08:29

## 2021-11-10 RX ADMIN — POTASSIUM CHLORIDE 40 MEQ: 1500 TABLET, EXTENDED RELEASE ORAL at 08:29

## 2021-11-11 PROBLEM — E87.6 HYPOKALEMIA: Status: ACTIVE | Noted: 2021-01-01

## 2021-11-11 LAB
ANION GAP SERPL CALCULATED.3IONS-SCNC: 2 MMOL/L (ref 4–13)
BUN SERPL-MCNC: 16 MG/DL (ref 5–25)
CALCIUM SERPL-MCNC: 9.2 MG/DL (ref 8.3–10.1)
CHLORIDE SERPL-SCNC: 97 MMOL/L (ref 100–108)
CO2 SERPL-SCNC: 40 MMOL/L (ref 21–32)
CREAT SERPL-MCNC: 0.96 MG/DL (ref 0.6–1.3)
GFR SERPL CREATININE-BSD FRML MDRD: 92 ML/MIN/1.73SQ M
GLUCOSE SERPL-MCNC: 108 MG/DL (ref 65–140)
POTASSIUM SERPL-SCNC: 3.3 MMOL/L (ref 3.5–5.3)
SODIUM SERPL-SCNC: 139 MMOL/L (ref 136–145)

## 2021-11-11 PROCEDURE — 99232 SBSQ HOSP IP/OBS MODERATE 35: CPT | Performed by: INTERNAL MEDICINE

## 2021-11-11 PROCEDURE — 94640 AIRWAY INHALATION TREATMENT: CPT

## 2021-11-11 PROCEDURE — 94760 N-INVAS EAR/PLS OXIMETRY 1: CPT

## 2021-11-11 PROCEDURE — 80048 BASIC METABOLIC PNL TOTAL CA: CPT | Performed by: PHYSICIAN ASSISTANT

## 2021-11-11 PROCEDURE — 99232 SBSQ HOSP IP/OBS MODERATE 35: CPT | Performed by: PHYSICIAN ASSISTANT

## 2021-11-11 PROCEDURE — 94660 CPAP INITIATION&MGMT: CPT

## 2021-11-11 RX ORDER — POTASSIUM CHLORIDE 20 MEQ/1
40 TABLET, EXTENDED RELEASE ORAL DAILY
Status: DISCONTINUED | OUTPATIENT
Start: 2021-11-11 | End: 2021-11-15 | Stop reason: HOSPADM

## 2021-11-11 RX ADMIN — DABIGATRAN ETEXILATE MESYLATE 150 MG: 150 CAPSULE ORAL at 17:55

## 2021-11-11 RX ADMIN — LEVALBUTEROL HYDROCHLORIDE 1.25 MG: 1.25 SOLUTION, CONCENTRATE RESPIRATORY (INHALATION) at 19:42

## 2021-11-11 RX ADMIN — LEVALBUTEROL HYDROCHLORIDE 1.25 MG: 1.25 SOLUTION, CONCENTRATE RESPIRATORY (INHALATION) at 13:41

## 2021-11-11 RX ADMIN — FUROSEMIDE 40 MG: 10 INJECTION, SOLUTION INTRAMUSCULAR; INTRAVENOUS at 18:03

## 2021-11-11 RX ADMIN — GABAPENTIN 300 MG: 300 CAPSULE ORAL at 10:23

## 2021-11-11 RX ADMIN — NYSTATIN: 100000 CREAM TOPICAL at 10:38

## 2021-11-11 RX ADMIN — LEVALBUTEROL HYDROCHLORIDE 1.25 MG: 1.25 SOLUTION, CONCENTRATE RESPIRATORY (INHALATION) at 08:07

## 2021-11-11 RX ADMIN — METOPROLOL TARTRATE 50 MG: 50 TABLET, FILM COATED ORAL at 10:25

## 2021-11-11 RX ADMIN — FUROSEMIDE 40 MG: 10 INJECTION, SOLUTION INTRAMUSCULAR; INTRAVENOUS at 10:27

## 2021-11-11 RX ADMIN — POTASSIUM CHLORIDE 40 MEQ: 1500 TABLET, EXTENDED RELEASE ORAL at 10:24

## 2021-11-11 RX ADMIN — METOPROLOL TARTRATE 50 MG: 50 TABLET, FILM COATED ORAL at 21:46

## 2021-11-11 RX ADMIN — PANTOPRAZOLE SODIUM 20 MG: 20 TABLET, DELAYED RELEASE ORAL at 06:13

## 2021-11-11 RX ADMIN — ACETAMINOPHEN 650 MG: 325 TABLET, FILM COATED ORAL at 18:00

## 2021-11-11 RX ADMIN — SPIRONOLACTONE 50 MG: 25 TABLET ORAL at 10:28

## 2021-11-11 RX ADMIN — IPRATROPIUM BROMIDE 0.5 MG: 0.5 SOLUTION RESPIRATORY (INHALATION) at 19:42

## 2021-11-11 RX ADMIN — DOCUSATE SODIUM 100 MG: 100 CAPSULE ORAL at 10:27

## 2021-11-11 RX ADMIN — BUDESONIDE AND FORMOTEROL FUMARATE DIHYDRATE 2 PUFF: 160; 4.5 AEROSOL RESPIRATORY (INHALATION) at 18:02

## 2021-11-11 RX ADMIN — DOCUSATE SODIUM 100 MG: 100 CAPSULE ORAL at 17:55

## 2021-11-11 RX ADMIN — IPRATROPIUM BROMIDE 0.5 MG: 0.5 SOLUTION RESPIRATORY (INHALATION) at 13:41

## 2021-11-11 RX ADMIN — IPRATROPIUM BROMIDE 0.5 MG: 0.5 SOLUTION RESPIRATORY (INHALATION) at 08:06

## 2021-11-11 RX ADMIN — FUROSEMIDE 40 MG: 10 INJECTION, SOLUTION INTRAMUSCULAR; INTRAVENOUS at 21:46

## 2021-11-11 RX ADMIN — BUPROPION HYDROCHLORIDE 450 MG: 150 TABLET, EXTENDED RELEASE ORAL at 10:24

## 2021-11-11 RX ADMIN — DIGOXIN 125 MCG: 125 TABLET ORAL at 10:32

## 2021-11-11 RX ADMIN — BUDESONIDE AND FORMOTEROL FUMARATE DIHYDRATE 2 PUFF: 160; 4.5 AEROSOL RESPIRATORY (INHALATION) at 10:27

## 2021-11-11 RX ADMIN — Medication 2000 UNITS: at 10:24

## 2021-11-11 RX ADMIN — LEVOTHYROXINE SODIUM 25 MCG: 25 TABLET ORAL at 06:13

## 2021-11-11 RX ADMIN — NYSTATIN: 100000 CREAM TOPICAL at 18:03

## 2021-11-11 RX ADMIN — DABIGATRAN ETEXILATE MESYLATE 150 MG: 150 CAPSULE ORAL at 10:27

## 2021-11-12 LAB
ANION GAP SERPL CALCULATED.3IONS-SCNC: 5 MMOL/L (ref 4–13)
BUN SERPL-MCNC: 20 MG/DL (ref 5–25)
CALCIUM SERPL-MCNC: 9.5 MG/DL (ref 8.3–10.1)
CHLORIDE SERPL-SCNC: 95 MMOL/L (ref 100–108)
CO2 SERPL-SCNC: 34 MMOL/L (ref 21–32)
CREAT SERPL-MCNC: 1.14 MG/DL (ref 0.6–1.3)
GFR SERPL CREATININE-BSD FRML MDRD: 75 ML/MIN/1.73SQ M
GLUCOSE SERPL-MCNC: 122 MG/DL (ref 65–140)
POTASSIUM SERPL-SCNC: 4 MMOL/L (ref 3.5–5.3)
SODIUM SERPL-SCNC: 134 MMOL/L (ref 136–145)

## 2021-11-12 PROCEDURE — 99232 SBSQ HOSP IP/OBS MODERATE 35: CPT | Performed by: PHYSICIAN ASSISTANT

## 2021-11-12 PROCEDURE — 99232 SBSQ HOSP IP/OBS MODERATE 35: CPT | Performed by: INTERNAL MEDICINE

## 2021-11-12 PROCEDURE — 94760 N-INVAS EAR/PLS OXIMETRY 1: CPT

## 2021-11-12 PROCEDURE — 94640 AIRWAY INHALATION TREATMENT: CPT

## 2021-11-12 PROCEDURE — 94668 MNPJ CHEST WALL SBSQ: CPT

## 2021-11-12 PROCEDURE — 94660 CPAP INITIATION&MGMT: CPT

## 2021-11-12 PROCEDURE — 80048 BASIC METABOLIC PNL TOTAL CA: CPT | Performed by: PHYSICIAN ASSISTANT

## 2021-11-12 RX ORDER — GUAIFENESIN 600 MG
600 TABLET, EXTENDED RELEASE 12 HR ORAL EVERY 12 HOURS SCHEDULED
Status: DISCONTINUED | OUTPATIENT
Start: 2021-11-12 | End: 2021-11-15 | Stop reason: HOSPADM

## 2021-11-12 RX ADMIN — BUDESONIDE AND FORMOTEROL FUMARATE DIHYDRATE 2 PUFF: 160; 4.5 AEROSOL RESPIRATORY (INHALATION) at 17:13

## 2021-11-12 RX ADMIN — SPIRONOLACTONE 50 MG: 25 TABLET ORAL at 08:49

## 2021-11-12 RX ADMIN — DIGOXIN 125 MCG: 125 TABLET ORAL at 08:49

## 2021-11-12 RX ADMIN — LEVALBUTEROL HYDROCHLORIDE 1.25 MG: 1.25 SOLUTION, CONCENTRATE RESPIRATORY (INHALATION) at 13:30

## 2021-11-12 RX ADMIN — DOCUSATE SODIUM 100 MG: 100 CAPSULE ORAL at 17:13

## 2021-11-12 RX ADMIN — LEVOTHYROXINE SODIUM 25 MCG: 25 TABLET ORAL at 05:49

## 2021-11-12 RX ADMIN — BUDESONIDE AND FORMOTEROL FUMARATE DIHYDRATE 2 PUFF: 160; 4.5 AEROSOL RESPIRATORY (INHALATION) at 08:54

## 2021-11-12 RX ADMIN — ACETAMINOPHEN 650 MG: 325 TABLET, FILM COATED ORAL at 17:13

## 2021-11-12 RX ADMIN — METOPROLOL TARTRATE 50 MG: 50 TABLET, FILM COATED ORAL at 20:39

## 2021-11-12 RX ADMIN — ACETAMINOPHEN 650 MG: 325 TABLET, FILM COATED ORAL at 08:53

## 2021-11-12 RX ADMIN — FUROSEMIDE 40 MG: 10 INJECTION, SOLUTION INTRAMUSCULAR; INTRAVENOUS at 20:39

## 2021-11-12 RX ADMIN — METOPROLOL TARTRATE 50 MG: 50 TABLET, FILM COATED ORAL at 08:49

## 2021-11-12 RX ADMIN — PANTOPRAZOLE SODIUM 20 MG: 20 TABLET, DELAYED RELEASE ORAL at 05:49

## 2021-11-12 RX ADMIN — NYSTATIN: 100000 CREAM TOPICAL at 17:13

## 2021-11-12 RX ADMIN — GUAIFENESIN 600 MG: 600 TABLET, EXTENDED RELEASE ORAL at 10:15

## 2021-11-12 RX ADMIN — LEVALBUTEROL HYDROCHLORIDE 1.25 MG: 1.25 SOLUTION, CONCENTRATE RESPIRATORY (INHALATION) at 19:19

## 2021-11-12 RX ADMIN — FUROSEMIDE 40 MG: 10 INJECTION, SOLUTION INTRAMUSCULAR; INTRAVENOUS at 17:13

## 2021-11-12 RX ADMIN — LEVALBUTEROL HYDROCHLORIDE 1.25 MG: 1.25 SOLUTION, CONCENTRATE RESPIRATORY (INHALATION) at 07:45

## 2021-11-12 RX ADMIN — NYSTATIN: 100000 CREAM TOPICAL at 08:50

## 2021-11-12 RX ADMIN — IPRATROPIUM BROMIDE 0.5 MG: 0.5 SOLUTION RESPIRATORY (INHALATION) at 19:19

## 2021-11-12 RX ADMIN — BUPROPION HYDROCHLORIDE 450 MG: 150 TABLET, EXTENDED RELEASE ORAL at 08:49

## 2021-11-12 RX ADMIN — DOCUSATE SODIUM 100 MG: 100 CAPSULE ORAL at 08:49

## 2021-11-12 RX ADMIN — Medication 2000 UNITS: at 08:49

## 2021-11-12 RX ADMIN — IPRATROPIUM BROMIDE 0.5 MG: 0.5 SOLUTION RESPIRATORY (INHALATION) at 07:45

## 2021-11-12 RX ADMIN — POTASSIUM CHLORIDE 40 MEQ: 1500 TABLET, EXTENDED RELEASE ORAL at 08:49

## 2021-11-12 RX ADMIN — GUAIFENESIN 600 MG: 600 TABLET, EXTENDED RELEASE ORAL at 20:39

## 2021-11-12 RX ADMIN — DABIGATRAN ETEXILATE MESYLATE 150 MG: 150 CAPSULE ORAL at 08:54

## 2021-11-12 RX ADMIN — DABIGATRAN ETEXILATE MESYLATE 150 MG: 150 CAPSULE ORAL at 17:13

## 2021-11-12 RX ADMIN — IPRATROPIUM BROMIDE 0.5 MG: 0.5 SOLUTION RESPIRATORY (INHALATION) at 13:30

## 2021-11-12 RX ADMIN — FUROSEMIDE 40 MG: 10 INJECTION, SOLUTION INTRAMUSCULAR; INTRAVENOUS at 08:53

## 2021-11-13 LAB
ANION GAP SERPL CALCULATED.3IONS-SCNC: 2 MMOL/L (ref 4–13)
BUN SERPL-MCNC: 20 MG/DL (ref 5–25)
CALCIUM SERPL-MCNC: 9.3 MG/DL (ref 8.3–10.1)
CHLORIDE SERPL-SCNC: 96 MMOL/L (ref 100–108)
CO2 SERPL-SCNC: 37 MMOL/L (ref 21–32)
CREAT SERPL-MCNC: 1.09 MG/DL (ref 0.6–1.3)
GFR SERPL CREATININE-BSD FRML MDRD: 79 ML/MIN/1.73SQ M
GLUCOSE SERPL-MCNC: 123 MG/DL (ref 65–140)
POTASSIUM SERPL-SCNC: 3.5 MMOL/L (ref 3.5–5.3)
SODIUM SERPL-SCNC: 135 MMOL/L (ref 136–145)

## 2021-11-13 PROCEDURE — 80048 BASIC METABOLIC PNL TOTAL CA: CPT | Performed by: PHYSICIAN ASSISTANT

## 2021-11-13 PROCEDURE — 94640 AIRWAY INHALATION TREATMENT: CPT

## 2021-11-13 PROCEDURE — 94760 N-INVAS EAR/PLS OXIMETRY 1: CPT

## 2021-11-13 PROCEDURE — 99232 SBSQ HOSP IP/OBS MODERATE 35: CPT | Performed by: INTERNAL MEDICINE

## 2021-11-13 PROCEDURE — 94660 CPAP INITIATION&MGMT: CPT

## 2021-11-13 RX ORDER — AMMONIUM LACTATE 12 G/100G
CREAM TOPICAL 2 TIMES DAILY
Status: DISCONTINUED | OUTPATIENT
Start: 2021-11-13 | End: 2021-11-15 | Stop reason: HOSPADM

## 2021-11-13 RX ADMIN — DABIGATRAN ETEXILATE MESYLATE 150 MG: 150 CAPSULE ORAL at 08:53

## 2021-11-13 RX ADMIN — Medication 1 APPLICATION: at 17:13

## 2021-11-13 RX ADMIN — METOPROLOL TARTRATE 50 MG: 50 TABLET, FILM COATED ORAL at 21:29

## 2021-11-13 RX ADMIN — FUROSEMIDE 40 MG: 10 INJECTION, SOLUTION INTRAMUSCULAR; INTRAVENOUS at 08:52

## 2021-11-13 RX ADMIN — IPRATROPIUM BROMIDE 0.5 MG: 0.5 SOLUTION RESPIRATORY (INHALATION) at 13:43

## 2021-11-13 RX ADMIN — PANTOPRAZOLE SODIUM 20 MG: 20 TABLET, DELAYED RELEASE ORAL at 04:54

## 2021-11-13 RX ADMIN — Medication 2000 UNITS: at 08:52

## 2021-11-13 RX ADMIN — DOCUSATE SODIUM 100 MG: 100 CAPSULE ORAL at 17:13

## 2021-11-13 RX ADMIN — FUROSEMIDE 40 MG: 10 INJECTION, SOLUTION INTRAMUSCULAR; INTRAVENOUS at 21:29

## 2021-11-13 RX ADMIN — SPIRONOLACTONE 50 MG: 25 TABLET ORAL at 08:53

## 2021-11-13 RX ADMIN — GUAIFENESIN 600 MG: 600 TABLET, EXTENDED RELEASE ORAL at 08:53

## 2021-11-13 RX ADMIN — LEVALBUTEROL HYDROCHLORIDE 1.25 MG: 1.25 SOLUTION, CONCENTRATE RESPIRATORY (INHALATION) at 19:17

## 2021-11-13 RX ADMIN — LEVOTHYROXINE SODIUM 25 MCG: 25 TABLET ORAL at 04:54

## 2021-11-13 RX ADMIN — NYSTATIN 1 APPLICATION: 100000 CREAM TOPICAL at 17:16

## 2021-11-13 RX ADMIN — DABIGATRAN ETEXILATE MESYLATE 150 MG: 150 CAPSULE ORAL at 17:13

## 2021-11-13 RX ADMIN — LEVALBUTEROL HYDROCHLORIDE 1.25 MG: 1.25 SOLUTION, CONCENTRATE RESPIRATORY (INHALATION) at 13:43

## 2021-11-13 RX ADMIN — Medication 1 APPLICATION: at 12:29

## 2021-11-13 RX ADMIN — IPRATROPIUM BROMIDE 0.5 MG: 0.5 SOLUTION RESPIRATORY (INHALATION) at 19:17

## 2021-11-13 RX ADMIN — POTASSIUM CHLORIDE 40 MEQ: 1500 TABLET, EXTENDED RELEASE ORAL at 08:53

## 2021-11-13 RX ADMIN — FUROSEMIDE 40 MG: 10 INJECTION, SOLUTION INTRAMUSCULAR; INTRAVENOUS at 16:12

## 2021-11-13 RX ADMIN — BUPROPION HYDROCHLORIDE 450 MG: 150 TABLET, EXTENDED RELEASE ORAL at 08:51

## 2021-11-13 RX ADMIN — NYSTATIN 1 APPLICATION: 100000 CREAM TOPICAL at 08:55

## 2021-11-13 RX ADMIN — LEVALBUTEROL HYDROCHLORIDE 1.25 MG: 1.25 SOLUTION, CONCENTRATE RESPIRATORY (INHALATION) at 08:31

## 2021-11-13 RX ADMIN — POLYETHYLENE GLYCOL 3350 17 G: 17 POWDER, FOR SOLUTION ORAL at 08:51

## 2021-11-13 RX ADMIN — BUDESONIDE AND FORMOTEROL FUMARATE DIHYDRATE 2 PUFF: 160; 4.5 AEROSOL RESPIRATORY (INHALATION) at 08:55

## 2021-11-13 RX ADMIN — DOCUSATE SODIUM 100 MG: 100 CAPSULE ORAL at 08:52

## 2021-11-13 RX ADMIN — METOPROLOL TARTRATE 50 MG: 50 TABLET, FILM COATED ORAL at 08:54

## 2021-11-13 RX ADMIN — IPRATROPIUM BROMIDE 0.5 MG: 0.5 SOLUTION RESPIRATORY (INHALATION) at 08:31

## 2021-11-13 RX ADMIN — DIGOXIN 125 MCG: 125 TABLET ORAL at 08:54

## 2021-11-13 RX ADMIN — BUDESONIDE AND FORMOTEROL FUMARATE DIHYDRATE 2 PUFF: 160; 4.5 AEROSOL RESPIRATORY (INHALATION) at 17:13

## 2021-11-13 RX ADMIN — GUAIFENESIN 600 MG: 600 TABLET, EXTENDED RELEASE ORAL at 21:30

## 2021-11-14 LAB
ANION GAP SERPL CALCULATED.3IONS-SCNC: 3 MMOL/L (ref 4–13)
BUN SERPL-MCNC: 20 MG/DL (ref 5–25)
CALCIUM SERPL-MCNC: 9.3 MG/DL (ref 8.3–10.1)
CHLORIDE SERPL-SCNC: 95 MMOL/L (ref 100–108)
CO2 SERPL-SCNC: 38 MMOL/L (ref 21–32)
CREAT SERPL-MCNC: 1.13 MG/DL (ref 0.6–1.3)
GFR SERPL CREATININE-BSD FRML MDRD: 75 ML/MIN/1.73SQ M
GLUCOSE SERPL-MCNC: 117 MG/DL (ref 65–140)
MAGNESIUM SERPL-MCNC: 2.3 MG/DL (ref 1.6–2.6)
POTASSIUM SERPL-SCNC: 3.3 MMOL/L (ref 3.5–5.3)
SODIUM SERPL-SCNC: 136 MMOL/L (ref 136–145)

## 2021-11-14 PROCEDURE — 94640 AIRWAY INHALATION TREATMENT: CPT

## 2021-11-14 PROCEDURE — 94760 N-INVAS EAR/PLS OXIMETRY 1: CPT

## 2021-11-14 PROCEDURE — 94660 CPAP INITIATION&MGMT: CPT

## 2021-11-14 PROCEDURE — 80048 BASIC METABOLIC PNL TOTAL CA: CPT | Performed by: INTERNAL MEDICINE

## 2021-11-14 PROCEDURE — 83735 ASSAY OF MAGNESIUM: CPT | Performed by: INTERNAL MEDICINE

## 2021-11-14 PROCEDURE — 99232 SBSQ HOSP IP/OBS MODERATE 35: CPT | Performed by: INTERNAL MEDICINE

## 2021-11-14 RX ORDER — POTASSIUM CHLORIDE 20 MEQ/1
40 TABLET, EXTENDED RELEASE ORAL ONCE
Status: COMPLETED | OUTPATIENT
Start: 2021-11-14 | End: 2021-11-14

## 2021-11-14 RX ORDER — FUROSEMIDE 80 MG
80 TABLET ORAL
Status: DISCONTINUED | OUTPATIENT
Start: 2021-11-14 | End: 2021-11-15 | Stop reason: HOSPADM

## 2021-11-14 RX ADMIN — DIGOXIN 125 MCG: 125 TABLET ORAL at 08:35

## 2021-11-14 RX ADMIN — BUPROPION HYDROCHLORIDE 450 MG: 150 TABLET, EXTENDED RELEASE ORAL at 08:34

## 2021-11-14 RX ADMIN — POTASSIUM CHLORIDE 40 MEQ: 1500 TABLET, EXTENDED RELEASE ORAL at 11:25

## 2021-11-14 RX ADMIN — METOPROLOL TARTRATE 50 MG: 50 TABLET, FILM COATED ORAL at 20:38

## 2021-11-14 RX ADMIN — DABIGATRAN ETEXILATE MESYLATE 150 MG: 150 CAPSULE ORAL at 17:18

## 2021-11-14 RX ADMIN — FUROSEMIDE 80 MG: 80 TABLET ORAL at 17:18

## 2021-11-14 RX ADMIN — GUAIFENESIN 600 MG: 600 TABLET, EXTENDED RELEASE ORAL at 20:38

## 2021-11-14 RX ADMIN — BUDESONIDE AND FORMOTEROL FUMARATE DIHYDRATE 2 PUFF: 160; 4.5 AEROSOL RESPIRATORY (INHALATION) at 08:36

## 2021-11-14 RX ADMIN — Medication 2000 UNITS: at 08:34

## 2021-11-14 RX ADMIN — DOCUSATE SODIUM 100 MG: 100 CAPSULE ORAL at 08:35

## 2021-11-14 RX ADMIN — LEVALBUTEROL HYDROCHLORIDE 1.25 MG: 1.25 SOLUTION, CONCENTRATE RESPIRATORY (INHALATION) at 14:07

## 2021-11-14 RX ADMIN — DOCUSATE SODIUM 100 MG: 100 CAPSULE ORAL at 17:18

## 2021-11-14 RX ADMIN — Medication 1 APPLICATION: at 17:19

## 2021-11-14 RX ADMIN — Medication 1 APPLICATION: at 08:36

## 2021-11-14 RX ADMIN — IPRATROPIUM BROMIDE 0.5 MG: 0.5 SOLUTION RESPIRATORY (INHALATION) at 07:45

## 2021-11-14 RX ADMIN — LEVALBUTEROL HYDROCHLORIDE 1.25 MG: 1.25 SOLUTION, CONCENTRATE RESPIRATORY (INHALATION) at 19:19

## 2021-11-14 RX ADMIN — GUAIFENESIN 600 MG: 600 TABLET, EXTENDED RELEASE ORAL at 08:34

## 2021-11-14 RX ADMIN — IPRATROPIUM BROMIDE 0.5 MG: 0.5 SOLUTION RESPIRATORY (INHALATION) at 19:19

## 2021-11-14 RX ADMIN — LEVOTHYROXINE SODIUM 25 MCG: 25 TABLET ORAL at 05:40

## 2021-11-14 RX ADMIN — LEVALBUTEROL HYDROCHLORIDE 1.25 MG: 1.25 SOLUTION, CONCENTRATE RESPIRATORY (INHALATION) at 07:45

## 2021-11-14 RX ADMIN — DABIGATRAN ETEXILATE MESYLATE 150 MG: 150 CAPSULE ORAL at 08:35

## 2021-11-14 RX ADMIN — NYSTATIN 1 APPLICATION: 100000 CREAM TOPICAL at 08:36

## 2021-11-14 RX ADMIN — PANTOPRAZOLE SODIUM 20 MG: 20 TABLET, DELAYED RELEASE ORAL at 08:35

## 2021-11-14 RX ADMIN — POTASSIUM CHLORIDE 40 MEQ: 1500 TABLET, EXTENDED RELEASE ORAL at 08:35

## 2021-11-14 RX ADMIN — NYSTATIN 1 APPLICATION: 100000 CREAM TOPICAL at 17:18

## 2021-11-14 RX ADMIN — IPRATROPIUM BROMIDE 0.5 MG: 0.5 SOLUTION RESPIRATORY (INHALATION) at 14:07

## 2021-11-14 RX ADMIN — FUROSEMIDE 40 MG: 10 INJECTION, SOLUTION INTRAMUSCULAR; INTRAVENOUS at 08:35

## 2021-11-14 RX ADMIN — BUDESONIDE AND FORMOTEROL FUMARATE DIHYDRATE 2 PUFF: 160; 4.5 AEROSOL RESPIRATORY (INHALATION) at 17:19

## 2021-11-15 VITALS
WEIGHT: 315 LBS | RESPIRATION RATE: 18 BRPM | BODY MASS INDEX: 45.1 KG/M2 | SYSTOLIC BLOOD PRESSURE: 118 MMHG | HEIGHT: 70 IN | TEMPERATURE: 97.8 F | DIASTOLIC BLOOD PRESSURE: 61 MMHG | HEART RATE: 84 BPM | OXYGEN SATURATION: 95 %

## 2021-11-15 PROBLEM — R09.81 NASAL CONGESTION: Status: ACTIVE | Noted: 2021-11-15

## 2021-11-15 LAB
ANION GAP SERPL CALCULATED.3IONS-SCNC: 7 MMOL/L (ref 4–13)
BASOPHILS # BLD AUTO: 0.03 THOUSANDS/ΜL (ref 0–0.1)
BASOPHILS NFR BLD AUTO: 0 % (ref 0–1)
BUN SERPL-MCNC: 18 MG/DL (ref 5–25)
CALCIUM SERPL-MCNC: 9.3 MG/DL (ref 8.3–10.1)
CHLORIDE SERPL-SCNC: 96 MMOL/L (ref 100–108)
CO2 SERPL-SCNC: 29 MMOL/L (ref 21–32)
CREAT SERPL-MCNC: 0.93 MG/DL (ref 0.6–1.3)
EOSINOPHIL # BLD AUTO: 0.19 THOUSAND/ΜL (ref 0–0.61)
EOSINOPHIL NFR BLD AUTO: 3 % (ref 0–6)
ERYTHROCYTE [DISTWIDTH] IN BLOOD BY AUTOMATED COUNT: 13.6 % (ref 11.6–15.1)
GFR SERPL CREATININE-BSD FRML MDRD: 95 ML/MIN/1.73SQ M
GLUCOSE SERPL-MCNC: 115 MG/DL (ref 65–140)
HCT VFR BLD AUTO: 40.9 % (ref 36.5–49.3)
HGB BLD-MCNC: 13 G/DL (ref 12–17)
IMM GRANULOCYTES # BLD AUTO: 0.02 THOUSAND/UL (ref 0–0.2)
IMM GRANULOCYTES NFR BLD AUTO: 0 % (ref 0–2)
LYMPHOCYTES # BLD AUTO: 1.19 THOUSANDS/ΜL (ref 0.6–4.47)
LYMPHOCYTES NFR BLD AUTO: 17 % (ref 14–44)
MAGNESIUM SERPL-MCNC: 2.6 MG/DL (ref 1.6–2.6)
MCH RBC QN AUTO: 29.2 PG (ref 26.8–34.3)
MCHC RBC AUTO-ENTMCNC: 31.8 G/DL (ref 31.4–37.4)
MCV RBC AUTO: 92 FL (ref 82–98)
MONOCYTES # BLD AUTO: 0.36 THOUSAND/ΜL (ref 0.17–1.22)
MONOCYTES NFR BLD AUTO: 5 % (ref 4–12)
NEUTROPHILS # BLD AUTO: 5.1 THOUSANDS/ΜL (ref 1.85–7.62)
NEUTS SEG NFR BLD AUTO: 75 % (ref 43–75)
NRBC BLD AUTO-RTO: 0 /100 WBCS
PLATELET # BLD AUTO: 140 THOUSANDS/UL (ref 149–390)
PMV BLD AUTO: 10.5 FL (ref 8.9–12.7)
POTASSIUM SERPL-SCNC: 3.9 MMOL/L (ref 3.5–5.3)
RBC # BLD AUTO: 4.45 MILLION/UL (ref 3.88–5.62)
SODIUM SERPL-SCNC: 132 MMOL/L (ref 136–145)
WBC # BLD AUTO: 6.89 THOUSAND/UL (ref 4.31–10.16)

## 2021-11-15 PROCEDURE — 94660 CPAP INITIATION&MGMT: CPT

## 2021-11-15 PROCEDURE — 83735 ASSAY OF MAGNESIUM: CPT | Performed by: INTERNAL MEDICINE

## 2021-11-15 PROCEDURE — 94668 MNPJ CHEST WALL SBSQ: CPT

## 2021-11-15 PROCEDURE — 99239 HOSP IP/OBS DSCHRG MGMT >30: CPT | Performed by: INTERNAL MEDICINE

## 2021-11-15 PROCEDURE — 94640 AIRWAY INHALATION TREATMENT: CPT

## 2021-11-15 PROCEDURE — 85025 COMPLETE CBC W/AUTO DIFF WBC: CPT | Performed by: INTERNAL MEDICINE

## 2021-11-15 PROCEDURE — 94760 N-INVAS EAR/PLS OXIMETRY 1: CPT

## 2021-11-15 PROCEDURE — 99232 SBSQ HOSP IP/OBS MODERATE 35: CPT | Performed by: INTERNAL MEDICINE

## 2021-11-15 PROCEDURE — 80048 BASIC METABOLIC PNL TOTAL CA: CPT | Performed by: INTERNAL MEDICINE

## 2021-11-15 RX ORDER — AMMONIUM LACTATE 12 G/100G
CREAM TOPICAL 2 TIMES DAILY
Qty: 385 G | Refills: 0
Start: 2021-11-15

## 2021-11-15 RX ORDER — METOPROLOL TARTRATE 50 MG/1
50 TABLET, FILM COATED ORAL EVERY 12 HOURS SCHEDULED
Refills: 0 | Status: ON HOLD
Start: 2021-11-15 | End: 2022-04-10 | Stop reason: SDUPTHER

## 2021-11-15 RX ORDER — FUROSEMIDE 80 MG
80 TABLET ORAL 2 TIMES DAILY
Refills: 0
Start: 2021-11-15 | End: 2022-03-18 | Stop reason: HOSPADM

## 2021-11-15 RX ORDER — POTASSIUM CHLORIDE 20 MEQ/1
40 TABLET, EXTENDED RELEASE ORAL DAILY
Refills: 0
Start: 2021-11-16 | End: 2021-11-26

## 2021-11-15 RX ORDER — ECHINACEA PURPUREA EXTRACT 125 MG
1 TABLET ORAL
Status: DISCONTINUED | OUTPATIENT
Start: 2021-11-15 | End: 2021-11-15 | Stop reason: HOSPADM

## 2021-11-15 RX ORDER — NYSTATIN 100000 U/G
CREAM TOPICAL 2 TIMES DAILY
Qty: 30 G | Refills: 0
Start: 2021-11-15

## 2021-11-15 RX ORDER — BUPROPION HYDROCHLORIDE 450 MG/1
450 TABLET, FILM COATED, EXTENDED RELEASE ORAL DAILY
Refills: 0
Start: 2021-11-16

## 2021-11-15 RX ORDER — ECHINACEA PURPUREA EXTRACT 125 MG
1 TABLET ORAL AS NEEDED
Refills: 0
Start: 2021-11-15

## 2021-11-15 RX ADMIN — METOPROLOL TARTRATE 50 MG: 50 TABLET, FILM COATED ORAL at 09:38

## 2021-11-15 RX ADMIN — DOCUSATE SODIUM 100 MG: 100 CAPSULE ORAL at 09:38

## 2021-11-15 RX ADMIN — POTASSIUM CHLORIDE 40 MEQ: 1500 TABLET, EXTENDED RELEASE ORAL at 09:38

## 2021-11-15 RX ADMIN — DIGOXIN 125 MCG: 125 TABLET ORAL at 09:38

## 2021-11-15 RX ADMIN — IPRATROPIUM BROMIDE 0.5 MG: 0.5 SOLUTION RESPIRATORY (INHALATION) at 07:35

## 2021-11-15 RX ADMIN — IPRATROPIUM BROMIDE 0.5 MG: 0.5 SOLUTION RESPIRATORY (INHALATION) at 13:40

## 2021-11-15 RX ADMIN — ACETAMINOPHEN 650 MG: 325 TABLET, FILM COATED ORAL at 09:37

## 2021-11-15 RX ADMIN — Medication 2000 UNITS: at 09:38

## 2021-11-15 RX ADMIN — GUAIFENESIN 600 MG: 600 TABLET, EXTENDED RELEASE ORAL at 09:37

## 2021-11-15 RX ADMIN — NYSTATIN: 100000 CREAM TOPICAL at 09:39

## 2021-11-15 RX ADMIN — FUROSEMIDE 80 MG: 80 TABLET ORAL at 09:38

## 2021-11-15 RX ADMIN — SPIRONOLACTONE 50 MG: 25 TABLET ORAL at 09:37

## 2021-11-15 RX ADMIN — Medication: at 09:39

## 2021-11-15 RX ADMIN — FUROSEMIDE 80 MG: 80 TABLET ORAL at 17:02

## 2021-11-15 RX ADMIN — BUDESONIDE AND FORMOTEROL FUMARATE DIHYDRATE 2 PUFF: 160; 4.5 AEROSOL RESPIRATORY (INHALATION) at 09:39

## 2021-11-15 RX ADMIN — LEVALBUTEROL HYDROCHLORIDE 1.25 MG: 1.25 SOLUTION, CONCENTRATE RESPIRATORY (INHALATION) at 13:40

## 2021-11-15 RX ADMIN — PANTOPRAZOLE SODIUM 20 MG: 20 TABLET, DELAYED RELEASE ORAL at 06:10

## 2021-11-15 RX ADMIN — LEVALBUTEROL HYDROCHLORIDE 1.25 MG: 1.25 SOLUTION, CONCENTRATE RESPIRATORY (INHALATION) at 07:35

## 2021-11-15 RX ADMIN — LEVOTHYROXINE SODIUM 25 MCG: 25 TABLET ORAL at 06:10

## 2021-11-15 RX ADMIN — DOCUSATE SODIUM 100 MG: 100 CAPSULE ORAL at 17:02

## 2021-11-15 RX ADMIN — BUPROPION HYDROCHLORIDE 450 MG: 150 TABLET, EXTENDED RELEASE ORAL at 09:38

## 2021-11-15 RX ADMIN — DABIGATRAN ETEXILATE MESYLATE 150 MG: 150 CAPSULE ORAL at 09:38

## 2021-11-15 RX ADMIN — DABIGATRAN ETEXILATE MESYLATE 150 MG: 150 CAPSULE ORAL at 17:02

## 2021-11-26 ENCOUNTER — APPOINTMENT (EMERGENCY)
Dept: CT IMAGING | Facility: HOSPITAL | Age: 51
DRG: 383 | End: 2021-11-26
Payer: COMMERCIAL

## 2021-11-26 ENCOUNTER — HOSPITAL ENCOUNTER (INPATIENT)
Facility: HOSPITAL | Age: 51
LOS: 6 days | Discharge: NURSING, REHAB OR LONG-TERM CARE | DRG: 383 | End: 2021-12-02
Attending: EMERGENCY MEDICINE | Admitting: INTERNAL MEDICINE
Payer: COMMERCIAL

## 2021-11-26 DIAGNOSIS — L03.221 CELLULITIS OF MULTIPLE SITES OF HEAD AND NECK: ICD-10-CM

## 2021-11-26 DIAGNOSIS — L03.90 CELLULITIS: ICD-10-CM

## 2021-11-26 DIAGNOSIS — L03.811 CELLULITIS OF MULTIPLE SITES OF HEAD AND NECK: ICD-10-CM

## 2021-11-26 DIAGNOSIS — L02.91 ABSCESS: Primary | ICD-10-CM

## 2021-11-26 LAB
ALBUMIN SERPL BCP-MCNC: 3.9 G/DL (ref 3.4–4.8)
ALP SERPL-CCNC: 70.8 U/L (ref 10–129)
ALT SERPL W P-5'-P-CCNC: 12 U/L (ref 5–63)
ANION GAP SERPL CALCULATED.3IONS-SCNC: 5 MMOL/L (ref 4–13)
APTT PPP: 31 SECONDS (ref 23–37)
AST SERPL W P-5'-P-CCNC: 12 U/L (ref 15–41)
ATRIAL RATE: 0 BPM
BASOPHILS # BLD AUTO: 0.02 THOUSANDS/ΜL (ref 0–0.1)
BASOPHILS NFR BLD AUTO: 0 % (ref 0–1)
BILIRUB SERPL-MCNC: 0.75 MG/DL (ref 0.3–1.2)
BUN SERPL-MCNC: 12 MG/DL (ref 6–20)
CALCIUM SERPL-MCNC: 9 MG/DL (ref 8.4–10.2)
CHLORIDE SERPL-SCNC: 96 MMOL/L (ref 96–108)
CO2 SERPL-SCNC: 38 MMOL/L (ref 22–33)
CREAT SERPL-MCNC: 0.95 MG/DL (ref 0.5–1.2)
EOSINOPHIL # BLD AUTO: 0.22 THOUSAND/ΜL (ref 0–0.61)
EOSINOPHIL NFR BLD AUTO: 3 % (ref 0–6)
ERYTHROCYTE [DISTWIDTH] IN BLOOD BY AUTOMATED COUNT: 14.4 % (ref 11.6–15.1)
GFR SERPL CREATININE-BSD FRML MDRD: 93 ML/MIN/1.73SQ M
GLUCOSE SERPL-MCNC: 124 MG/DL (ref 65–140)
HCT VFR BLD AUTO: 41.8 % (ref 36.5–49.3)
HGB BLD-MCNC: 13.5 G/DL (ref 12–17)
IMM GRANULOCYTES # BLD AUTO: 0.01 THOUSAND/UL (ref 0–0.2)
IMM GRANULOCYTES NFR BLD AUTO: 0 % (ref 0–2)
INR PPP: 1.1 (ref 0.84–1.19)
LACTATE SERPL-SCNC: 1.7 MMOL/L (ref 0–2)
LYMPHOCYTES # BLD AUTO: 1.3 THOUSANDS/ΜL (ref 0.6–4.47)
LYMPHOCYTES NFR BLD AUTO: 15 % (ref 14–44)
MCH RBC QN AUTO: 29.3 PG (ref 26.8–34.3)
MCHC RBC AUTO-ENTMCNC: 32.3 G/DL (ref 31.4–37.4)
MCV RBC AUTO: 91 FL (ref 82–98)
MONOCYTES # BLD AUTO: 0.62 THOUSAND/ΜL (ref 0.17–1.22)
MONOCYTES NFR BLD AUTO: 7 % (ref 4–12)
NEUTROPHILS # BLD AUTO: 6.77 THOUSANDS/ΜL (ref 1.85–7.62)
NEUTS SEG NFR BLD AUTO: 75 % (ref 43–75)
PLATELET # BLD AUTO: 216 THOUSANDS/UL (ref 149–390)
PMV BLD AUTO: 10.3 FL (ref 8.9–12.7)
POTASSIUM SERPL-SCNC: 4.2 MMOL/L (ref 3.5–5)
PR INTERVAL: 136 MS
PROCALCITONIN SERPL-MCNC: 0.05 NG/ML
PROT SERPL-MCNC: 7.4 G/DL (ref 6.4–8.3)
PROTHROMBIN TIME: 14.1 SECONDS (ref 11.6–14.5)
QRS AXIS: 18 DEGREES
QRSD INTERVAL: 98 MS
QT INTERVAL: 378 MS
QTC INTERVAL: 466 MS
RBC # BLD AUTO: 4.61 MILLION/UL (ref 3.88–5.62)
SODIUM SERPL-SCNC: 139 MMOL/L (ref 133–145)
T WAVE AXIS: -20 DEGREES
VENTRICULAR RATE: 91 BPM
WBC # BLD AUTO: 8.94 THOUSAND/UL (ref 4.31–10.16)

## 2021-11-26 PROCEDURE — 93010 ELECTROCARDIOGRAM REPORT: CPT | Performed by: INTERNAL MEDICINE

## 2021-11-26 PROCEDURE — 94760 N-INVAS EAR/PLS OXIMETRY 1: CPT

## 2021-11-26 PROCEDURE — 94664 DEMO&/EVAL PT USE INHALER: CPT

## 2021-11-26 PROCEDURE — 0J953ZZ DRAINAGE OF LEFT NECK SUBCUTANEOUS TISSUE AND FASCIA, PERCUTANEOUS APPROACH: ICD-10-PCS | Performed by: EMERGENCY MEDICINE

## 2021-11-26 PROCEDURE — 36415 COLL VENOUS BLD VENIPUNCTURE: CPT | Performed by: EMERGENCY MEDICINE

## 2021-11-26 PROCEDURE — 87186 SC STD MICRODIL/AGAR DIL: CPT | Performed by: INTERNAL MEDICINE

## 2021-11-26 PROCEDURE — 10060 I&D ABSCESS SIMPLE/SINGLE: CPT | Performed by: EMERGENCY MEDICINE

## 2021-11-26 PROCEDURE — 99285 EMERGENCY DEPT VISIT HI MDM: CPT

## 2021-11-26 PROCEDURE — 94640 AIRWAY INHALATION TREATMENT: CPT

## 2021-11-26 PROCEDURE — 85610 PROTHROMBIN TIME: CPT | Performed by: EMERGENCY MEDICINE

## 2021-11-26 PROCEDURE — 84145 PROCALCITONIN (PCT): CPT | Performed by: EMERGENCY MEDICINE

## 2021-11-26 PROCEDURE — 87040 BLOOD CULTURE FOR BACTERIA: CPT | Performed by: EMERGENCY MEDICINE

## 2021-11-26 PROCEDURE — 85730 THROMBOPLASTIN TIME PARTIAL: CPT | Performed by: EMERGENCY MEDICINE

## 2021-11-26 PROCEDURE — 83605 ASSAY OF LACTIC ACID: CPT | Performed by: EMERGENCY MEDICINE

## 2021-11-26 PROCEDURE — 87081 CULTURE SCREEN ONLY: CPT | Performed by: INTERNAL MEDICINE

## 2021-11-26 PROCEDURE — 85025 COMPLETE CBC W/AUTO DIFF WBC: CPT | Performed by: EMERGENCY MEDICINE

## 2021-11-26 PROCEDURE — 93005 ELECTROCARDIOGRAM TRACING: CPT

## 2021-11-26 PROCEDURE — 99223 1ST HOSP IP/OBS HIGH 75: CPT | Performed by: INTERNAL MEDICINE

## 2021-11-26 PROCEDURE — 70460 CT HEAD/BRAIN W/DYE: CPT

## 2021-11-26 PROCEDURE — 70491 CT SOFT TISSUE NECK W/DYE: CPT

## 2021-11-26 PROCEDURE — 99285 EMERGENCY DEPT VISIT HI MDM: CPT | Performed by: EMERGENCY MEDICINE

## 2021-11-26 PROCEDURE — 87070 CULTURE OTHR SPECIMN AEROBIC: CPT | Performed by: INTERNAL MEDICINE

## 2021-11-26 PROCEDURE — 80053 COMPREHEN METABOLIC PANEL: CPT | Performed by: EMERGENCY MEDICINE

## 2021-11-26 PROCEDURE — 96366 THER/PROPH/DIAG IV INF ADDON: CPT

## 2021-11-26 PROCEDURE — 87205 SMEAR GRAM STAIN: CPT | Performed by: INTERNAL MEDICINE

## 2021-11-26 PROCEDURE — 96365 THER/PROPH/DIAG IV INF INIT: CPT

## 2021-11-26 RX ORDER — ALBUTEROL SULFATE 2.5 MG/3ML
2.5 SOLUTION RESPIRATORY (INHALATION) EVERY 6 HOURS PRN
Status: DISCONTINUED | OUTPATIENT
Start: 2021-11-26 | End: 2021-12-02 | Stop reason: HOSPADM

## 2021-11-26 RX ORDER — GABAPENTIN 300 MG/1
300 CAPSULE ORAL 2 TIMES DAILY PRN
Status: DISCONTINUED | OUTPATIENT
Start: 2021-11-26 | End: 2021-12-02 | Stop reason: HOSPADM

## 2021-11-26 RX ORDER — IBUPROFEN 600 MG/1
600 TABLET ORAL EVERY 6 HOURS PRN
Status: DISPENSED | OUTPATIENT
Start: 2021-11-26 | End: 2021-11-30

## 2021-11-26 RX ORDER — DIGOXIN 125 MCG
125 TABLET ORAL DAILY
Status: DISCONTINUED | OUTPATIENT
Start: 2021-11-26 | End: 2021-12-02 | Stop reason: HOSPADM

## 2021-11-26 RX ORDER — MELATONIN
2000 DAILY
Status: DISCONTINUED | OUTPATIENT
Start: 2021-11-26 | End: 2021-12-02 | Stop reason: HOSPADM

## 2021-11-26 RX ORDER — IPRATROPIUM BROMIDE AND ALBUTEROL SULFATE 2.5; .5 MG/3ML; MG/3ML
3 SOLUTION RESPIRATORY (INHALATION) 4 TIMES DAILY
Status: DISCONTINUED | OUTPATIENT
Start: 2021-11-26 | End: 2021-11-26

## 2021-11-26 RX ORDER — POLYETHYLENE GLYCOL 3350 17 G/17G
17 POWDER, FOR SOLUTION ORAL DAILY PRN
Status: DISCONTINUED | OUTPATIENT
Start: 2021-11-26 | End: 2021-12-02 | Stop reason: HOSPADM

## 2021-11-26 RX ORDER — BUDESONIDE AND FORMOTEROL FUMARATE DIHYDRATE 160; 4.5 UG/1; UG/1
2 AEROSOL RESPIRATORY (INHALATION)
Status: DISCONTINUED | OUTPATIENT
Start: 2021-11-26 | End: 2021-12-02 | Stop reason: HOSPADM

## 2021-11-26 RX ORDER — SPIRONOLACTONE 25 MG/1
50 TABLET ORAL DAILY
Status: DISCONTINUED | OUTPATIENT
Start: 2021-11-26 | End: 2021-12-02 | Stop reason: HOSPADM

## 2021-11-26 RX ORDER — DABIGATRAN ETEXILATE 150 MG/1
150 CAPSULE, COATED PELLETS ORAL 2 TIMES DAILY
Status: DISCONTINUED | OUTPATIENT
Start: 2021-11-26 | End: 2021-11-29

## 2021-11-26 RX ORDER — PANTOPRAZOLE SODIUM 20 MG/1
20 TABLET, DELAYED RELEASE ORAL DAILY
Status: DISCONTINUED | OUTPATIENT
Start: 2021-11-26 | End: 2021-12-02 | Stop reason: HOSPADM

## 2021-11-26 RX ORDER — ACETAMINOPHEN 325 MG/1
975 TABLET ORAL EVERY 6 HOURS PRN
Status: DISCONTINUED | OUTPATIENT
Start: 2021-11-26 | End: 2021-12-02 | Stop reason: HOSPADM

## 2021-11-26 RX ORDER — LEVALBUTEROL 1.25 MG/.5ML
1.25 SOLUTION, CONCENTRATE RESPIRATORY (INHALATION)
Status: DISCONTINUED | OUTPATIENT
Start: 2021-11-26 | End: 2021-12-02 | Stop reason: HOSPADM

## 2021-11-26 RX ORDER — CLINDAMYCIN HYDROCHLORIDE 300 MG/1
450 CAPSULE ORAL 4 TIMES DAILY
COMMUNITY
End: 2021-12-02 | Stop reason: HOSPADM

## 2021-11-26 RX ORDER — ACETAMINOPHEN 500 MG
1000 TABLET ORAL EVERY 6 HOURS PRN
Status: ON HOLD | COMMUNITY
End: 2022-06-06

## 2021-11-26 RX ORDER — SENNOSIDES 8.6 MG
1 TABLET ORAL
Status: DISCONTINUED | OUTPATIENT
Start: 2021-11-26 | End: 2021-12-02 | Stop reason: HOSPADM

## 2021-11-26 RX ORDER — ACETAMINOPHEN 325 MG/1
650 TABLET ORAL EVERY 6 HOURS PRN
Status: DISCONTINUED | OUTPATIENT
Start: 2021-11-26 | End: 2021-11-26

## 2021-11-26 RX ORDER — LEVOTHYROXINE SODIUM 0.03 MG/1
25 TABLET ORAL
Status: DISCONTINUED | OUTPATIENT
Start: 2021-11-27 | End: 2021-12-02 | Stop reason: HOSPADM

## 2021-11-26 RX ORDER — FUROSEMIDE 80 MG
80 TABLET ORAL 2 TIMES DAILY
Status: DISCONTINUED | OUTPATIENT
Start: 2021-11-26 | End: 2021-12-02 | Stop reason: HOSPADM

## 2021-11-26 RX ORDER — METOPROLOL TARTRATE 50 MG/1
50 TABLET, FILM COATED ORAL EVERY 12 HOURS SCHEDULED
Status: DISCONTINUED | OUTPATIENT
Start: 2021-11-26 | End: 2021-12-02 | Stop reason: HOSPADM

## 2021-11-26 RX ORDER — BUPROPION HYDROCHLORIDE 150 MG/1
450 TABLET ORAL DAILY
Status: DISCONTINUED | OUTPATIENT
Start: 2021-11-26 | End: 2021-11-29

## 2021-11-26 RX ORDER — LIDOCAINE HYDROCHLORIDE AND EPINEPHRINE 10; 10 MG/ML; UG/ML
10 INJECTION, SOLUTION INFILTRATION; PERINEURAL ONCE
Status: COMPLETED | OUTPATIENT
Start: 2021-11-26 | End: 2021-11-26

## 2021-11-26 RX ORDER — BUDESONIDE AND FORMOTEROL FUMARATE DIHYDRATE 160; 4.5 UG/1; UG/1
2 AEROSOL RESPIRATORY (INHALATION) 2 TIMES DAILY
Status: DISCONTINUED | OUTPATIENT
Start: 2021-11-26 | End: 2021-11-26

## 2021-11-26 RX ADMIN — BUPROPION 450 MG: 150 TABLET, EXTENDED RELEASE ORAL at 16:26

## 2021-11-26 RX ADMIN — DABIGATRAN ETEXILATE MESYLATE 150 MG: 150 CAPSULE ORAL at 16:41

## 2021-11-26 RX ADMIN — Medication 2000 UNITS: at 16:26

## 2021-11-26 RX ADMIN — PANTOPRAZOLE SODIUM 20 MG: 20 TABLET, DELAYED RELEASE ORAL at 18:21

## 2021-11-26 RX ADMIN — BUDESONIDE AND FORMOTEROL FUMARATE DIHYDRATE 2 PUFF: 160; 4.5 AEROSOL RESPIRATORY (INHALATION) at 19:37

## 2021-11-26 RX ADMIN — DIGOXIN 125 MCG: 125 TABLET ORAL at 16:26

## 2021-11-26 RX ADMIN — IODIXANOL 100 ML: 320 INJECTION, SOLUTION INTRAVASCULAR at 12:18

## 2021-11-26 RX ADMIN — IPRATROPIUM BROMIDE 0.5 MG: 0.5 SOLUTION RESPIRATORY (INHALATION) at 19:37

## 2021-11-26 RX ADMIN — LIDOCAINE HYDROCHLORIDE,EPINEPHRINE BITARTRATE 10 ML: 10; .01 INJECTION, SOLUTION INFILTRATION; PERINEURAL at 10:52

## 2021-11-26 RX ADMIN — CYANOCOBALAMIN TAB 500 MCG 1000 MCG: 500 TAB at 16:41

## 2021-11-26 RX ADMIN — ACETAMINOPHEN 975 MG: 325 TABLET, FILM COATED ORAL at 19:20

## 2021-11-26 RX ADMIN — SENNOSIDES 8.6 MG: 8.6 TABLET, FILM COATED ORAL at 21:36

## 2021-11-26 RX ADMIN — SPIRONOLACTONE 50 MG: 25 TABLET ORAL at 16:26

## 2021-11-26 RX ADMIN — LEVALBUTEROL HYDROCHLORIDE 1.25 MG: 1.25 SOLUTION, CONCENTRATE RESPIRATORY (INHALATION) at 19:37

## 2021-11-26 RX ADMIN — VANCOMYCIN HYDROCHLORIDE 1250 MG: 1 INJECTION, POWDER, LYOPHILIZED, FOR SOLUTION INTRAVENOUS at 18:21

## 2021-11-26 RX ADMIN — FUROSEMIDE 80 MG: 80 TABLET ORAL at 16:41

## 2021-11-26 RX ADMIN — VANCOMYCIN HYDROCHLORIDE 2000 MG: 1 INJECTION, POWDER, LYOPHILIZED, FOR SOLUTION INTRAVENOUS at 10:59

## 2021-11-27 PROBLEM — J96.11 CHRONIC RESPIRATORY FAILURE WITH HYPOXIA (HCC): Chronic | Status: ACTIVE | Noted: 2021-01-01

## 2021-11-27 LAB
ANION GAP SERPL CALCULATED.3IONS-SCNC: 7 MMOL/L (ref 4–13)
BASOPHILS # BLD AUTO: 0.03 THOUSANDS/ΜL (ref 0–0.1)
BASOPHILS NFR BLD AUTO: 1 % (ref 0–1)
BUN SERPL-MCNC: 11 MG/DL (ref 6–20)
CALCIUM SERPL-MCNC: 9 MG/DL (ref 8.4–10.2)
CHLORIDE SERPL-SCNC: 98 MMOL/L (ref 96–108)
CO2 SERPL-SCNC: 36 MMOL/L (ref 22–33)
CREAT SERPL-MCNC: 0.82 MG/DL (ref 0.5–1.2)
EOSINOPHIL # BLD AUTO: 0.2 THOUSAND/ΜL (ref 0–0.61)
EOSINOPHIL NFR BLD AUTO: 4 % (ref 0–6)
ERYTHROCYTE [DISTWIDTH] IN BLOOD BY AUTOMATED COUNT: 14.5 % (ref 11.6–15.1)
EST. AVERAGE GLUCOSE BLD GHB EST-MCNC: 105 MG/DL
GFR SERPL CREATININE-BSD FRML MDRD: 103 ML/MIN/1.73SQ M
GLUCOSE SERPL-MCNC: 98 MG/DL (ref 65–140)
HBA1C MFR BLD: 5.3 %
HCT VFR BLD AUTO: 41 % (ref 36.5–49.3)
HGB BLD-MCNC: 13.3 G/DL (ref 12–17)
IMM GRANULOCYTES # BLD AUTO: 0.01 THOUSAND/UL (ref 0–0.2)
IMM GRANULOCYTES NFR BLD AUTO: 0 % (ref 0–2)
LYMPHOCYTES # BLD AUTO: 1.19 THOUSANDS/ΜL (ref 0.6–4.47)
LYMPHOCYTES NFR BLD AUTO: 21 % (ref 14–44)
MCH RBC QN AUTO: 29.9 PG (ref 26.8–34.3)
MCHC RBC AUTO-ENTMCNC: 32.4 G/DL (ref 31.4–37.4)
MCV RBC AUTO: 92 FL (ref 82–98)
MONOCYTES # BLD AUTO: 0.52 THOUSAND/ΜL (ref 0.17–1.22)
MONOCYTES NFR BLD AUTO: 9 % (ref 4–12)
NEUTROPHILS # BLD AUTO: 3.77 THOUSANDS/ΜL (ref 1.85–7.62)
NEUTS SEG NFR BLD AUTO: 65 % (ref 43–75)
PLATELET # BLD AUTO: 197 THOUSANDS/UL (ref 149–390)
PMV BLD AUTO: 10.4 FL (ref 8.9–12.7)
POTASSIUM SERPL-SCNC: 3.8 MMOL/L (ref 3.5–5)
PROCALCITONIN SERPL-MCNC: 0.08 NG/ML
RBC # BLD AUTO: 4.45 MILLION/UL (ref 3.88–5.62)
SODIUM SERPL-SCNC: 141 MMOL/L (ref 133–145)
WBC # BLD AUTO: 5.72 THOUSAND/UL (ref 4.31–10.16)

## 2021-11-27 PROCEDURE — 80048 BASIC METABOLIC PNL TOTAL CA: CPT | Performed by: INTERNAL MEDICINE

## 2021-11-27 PROCEDURE — 83036 HEMOGLOBIN GLYCOSYLATED A1C: CPT | Performed by: INTERNAL MEDICINE

## 2021-11-27 PROCEDURE — 99254 IP/OBS CNSLTJ NEW/EST MOD 60: CPT | Performed by: SURGERY

## 2021-11-27 PROCEDURE — 85025 COMPLETE CBC W/AUTO DIFF WBC: CPT | Performed by: INTERNAL MEDICINE

## 2021-11-27 PROCEDURE — 99232 SBSQ HOSP IP/OBS MODERATE 35: CPT | Performed by: INTERNAL MEDICINE

## 2021-11-27 PROCEDURE — 94640 AIRWAY INHALATION TREATMENT: CPT

## 2021-11-27 PROCEDURE — 94760 N-INVAS EAR/PLS OXIMETRY 1: CPT

## 2021-11-27 PROCEDURE — 84145 PROCALCITONIN (PCT): CPT | Performed by: EMERGENCY MEDICINE

## 2021-11-27 RX ORDER — KETOROLAC TROMETHAMINE 30 MG/ML
30 INJECTION, SOLUTION INTRAMUSCULAR; INTRAVENOUS EVERY 6 HOURS PRN
Status: DISCONTINUED | OUTPATIENT
Start: 2021-11-27 | End: 2021-11-30

## 2021-11-27 RX ORDER — CEPHALEXIN 500 MG/1
500 CAPSULE ORAL EVERY 6 HOURS SCHEDULED
Status: DISCONTINUED | OUTPATIENT
Start: 2021-11-27 | End: 2021-11-27

## 2021-11-27 RX ADMIN — KETOROLAC TROMETHAMINE 30 MG: 30 INJECTION, SOLUTION INTRAMUSCULAR at 16:48

## 2021-11-27 RX ADMIN — LEVALBUTEROL HYDROCHLORIDE 1.25 MG: 1.25 SOLUTION, CONCENTRATE RESPIRATORY (INHALATION) at 14:16

## 2021-11-27 RX ADMIN — DIGOXIN 125 MCG: 125 TABLET ORAL at 09:27

## 2021-11-27 RX ADMIN — PANTOPRAZOLE SODIUM 20 MG: 20 TABLET, DELAYED RELEASE ORAL at 09:27

## 2021-11-27 RX ADMIN — ACETAMINOPHEN 975 MG: 325 TABLET, FILM COATED ORAL at 06:36

## 2021-11-27 RX ADMIN — IPRATROPIUM BROMIDE 0.5 MG: 0.5 SOLUTION RESPIRATORY (INHALATION) at 14:16

## 2021-11-27 RX ADMIN — LEVALBUTEROL HYDROCHLORIDE 1.25 MG: 1.25 SOLUTION, CONCENTRATE RESPIRATORY (INHALATION) at 20:47

## 2021-11-27 RX ADMIN — LEVOTHYROXINE SODIUM 25 MCG: 25 TABLET ORAL at 05:28

## 2021-11-27 RX ADMIN — LEVALBUTEROL HYDROCHLORIDE 1.25 MG: 1.25 SOLUTION, CONCENTRATE RESPIRATORY (INHALATION) at 08:02

## 2021-11-27 RX ADMIN — Medication 2000 UNITS: at 09:27

## 2021-11-27 RX ADMIN — FUROSEMIDE 80 MG: 80 TABLET ORAL at 16:37

## 2021-11-27 RX ADMIN — IPRATROPIUM BROMIDE 0.5 MG: 0.5 SOLUTION RESPIRATORY (INHALATION) at 20:47

## 2021-11-27 RX ADMIN — SPIRONOLACTONE 50 MG: 25 TABLET ORAL at 09:27

## 2021-11-27 RX ADMIN — VANCOMYCIN HYDROCHLORIDE 1250 MG: 1 INJECTION, POWDER, LYOPHILIZED, FOR SOLUTION INTRAVENOUS at 16:35

## 2021-11-27 RX ADMIN — DABIGATRAN ETEXILATE MESYLATE 150 MG: 150 CAPSULE ORAL at 16:34

## 2021-11-27 RX ADMIN — SENNOSIDES 8.6 MG: 8.6 TABLET, FILM COATED ORAL at 22:31

## 2021-11-27 RX ADMIN — IPRATROPIUM BROMIDE 0.5 MG: 0.5 SOLUTION RESPIRATORY (INHALATION) at 08:02

## 2021-11-27 RX ADMIN — BUPROPION 450 MG: 150 TABLET, EXTENDED RELEASE ORAL at 09:27

## 2021-11-27 RX ADMIN — METOPROLOL TARTRATE 50 MG: 50 TABLET, FILM COATED ORAL at 09:27

## 2021-11-27 RX ADMIN — ACETAMINOPHEN 975 MG: 325 TABLET, FILM COATED ORAL at 20:07

## 2021-11-27 RX ADMIN — FUROSEMIDE 80 MG: 80 TABLET ORAL at 09:27

## 2021-11-27 RX ADMIN — METOPROLOL TARTRATE 50 MG: 50 TABLET, FILM COATED ORAL at 22:31

## 2021-11-27 RX ADMIN — DABIGATRAN ETEXILATE MESYLATE 150 MG: 150 CAPSULE ORAL at 09:27

## 2021-11-27 RX ADMIN — KETOROLAC TROMETHAMINE 30 MG: 30 INJECTION, SOLUTION INTRAMUSCULAR at 23:02

## 2021-11-27 RX ADMIN — BUDESONIDE AND FORMOTEROL FUMARATE DIHYDRATE 2 PUFF: 160; 4.5 AEROSOL RESPIRATORY (INHALATION) at 08:14

## 2021-11-27 RX ADMIN — CYANOCOBALAMIN TAB 500 MCG 1000 MCG: 500 TAB at 09:27

## 2021-11-27 RX ADMIN — BUDESONIDE AND FORMOTEROL FUMARATE DIHYDRATE 2 PUFF: 160; 4.5 AEROSOL RESPIRATORY (INHALATION) at 20:47

## 2021-11-27 RX ADMIN — VANCOMYCIN HYDROCHLORIDE 1250 MG: 1 INJECTION, POWDER, LYOPHILIZED, FOR SOLUTION INTRAVENOUS at 03:09

## 2021-11-28 LAB
ANION GAP SERPL CALCULATED.3IONS-SCNC: 8 MMOL/L (ref 4–13)
BASOPHILS # BLD AUTO: 0.03 THOUSANDS/ΜL (ref 0–0.1)
BASOPHILS NFR BLD AUTO: 1 % (ref 0–1)
BUN SERPL-MCNC: 13 MG/DL (ref 6–20)
CALCIUM SERPL-MCNC: 9.4 MG/DL (ref 8.4–10.2)
CHLORIDE SERPL-SCNC: 96 MMOL/L (ref 96–108)
CO2 SERPL-SCNC: 36 MMOL/L (ref 22–33)
CREAT SERPL-MCNC: 0.97 MG/DL (ref 0.5–1.2)
EOSINOPHIL # BLD AUTO: 0.21 THOUSAND/ΜL (ref 0–0.61)
EOSINOPHIL NFR BLD AUTO: 4 % (ref 0–6)
ERYTHROCYTE [DISTWIDTH] IN BLOOD BY AUTOMATED COUNT: 14.3 % (ref 11.6–15.1)
GFR SERPL CREATININE-BSD FRML MDRD: 91 ML/MIN/1.73SQ M
GLUCOSE SERPL-MCNC: 149 MG/DL (ref 65–140)
HCT VFR BLD AUTO: 39.3 % (ref 36.5–49.3)
HGB BLD-MCNC: 12.8 G/DL (ref 12–17)
IMM GRANULOCYTES # BLD AUTO: 0.01 THOUSAND/UL (ref 0–0.2)
IMM GRANULOCYTES NFR BLD AUTO: 0 % (ref 0–2)
LYMPHOCYTES # BLD AUTO: 0.87 THOUSANDS/ΜL (ref 0.6–4.47)
LYMPHOCYTES NFR BLD AUTO: 16 % (ref 14–44)
MCH RBC QN AUTO: 30.1 PG (ref 26.8–34.3)
MCHC RBC AUTO-ENTMCNC: 32.6 G/DL (ref 31.4–37.4)
MCV RBC AUTO: 93 FL (ref 82–98)
MONOCYTES # BLD AUTO: 0.31 THOUSAND/ΜL (ref 0.17–1.22)
MONOCYTES NFR BLD AUTO: 6 % (ref 4–12)
MRSA NOSE QL CULT: ABNORMAL
MRSA NOSE QL CULT: ABNORMAL
NEUTROPHILS # BLD AUTO: 4.07 THOUSANDS/ΜL (ref 1.85–7.62)
NEUTS SEG NFR BLD AUTO: 73 % (ref 43–75)
PLATELET # BLD AUTO: 186 THOUSANDS/UL (ref 149–390)
PMV BLD AUTO: 10.3 FL (ref 8.9–12.7)
POTASSIUM SERPL-SCNC: 3.8 MMOL/L (ref 3.5–5)
PROCALCITONIN SERPL-MCNC: 0.07 NG/ML
RBC # BLD AUTO: 4.25 MILLION/UL (ref 3.88–5.62)
SODIUM SERPL-SCNC: 140 MMOL/L (ref 133–145)
VANCOMYCIN TROUGH SERPL-MCNC: 15.6 UG/ML (ref 10–20)
WBC # BLD AUTO: 5.5 THOUSAND/UL (ref 4.31–10.16)

## 2021-11-28 PROCEDURE — 84145 PROCALCITONIN (PCT): CPT | Performed by: INTERNAL MEDICINE

## 2021-11-28 PROCEDURE — 94760 N-INVAS EAR/PLS OXIMETRY 1: CPT

## 2021-11-28 PROCEDURE — 80202 ASSAY OF VANCOMYCIN: CPT | Performed by: INTERNAL MEDICINE

## 2021-11-28 PROCEDURE — 94640 AIRWAY INHALATION TREATMENT: CPT

## 2021-11-28 PROCEDURE — 99232 SBSQ HOSP IP/OBS MODERATE 35: CPT | Performed by: INTERNAL MEDICINE

## 2021-11-28 PROCEDURE — 85025 COMPLETE CBC W/AUTO DIFF WBC: CPT | Performed by: INTERNAL MEDICINE

## 2021-11-28 PROCEDURE — 80048 BASIC METABOLIC PNL TOTAL CA: CPT | Performed by: INTERNAL MEDICINE

## 2021-11-28 RX ADMIN — SENNOSIDES 8.6 MG: 8.6 TABLET, FILM COATED ORAL at 21:20

## 2021-11-28 RX ADMIN — VANCOMYCIN HYDROCHLORIDE 1250 MG: 1 INJECTION, POWDER, LYOPHILIZED, FOR SOLUTION INTRAVENOUS at 01:05

## 2021-11-28 RX ADMIN — LEVALBUTEROL HYDROCHLORIDE 1.25 MG: 1.25 SOLUTION, CONCENTRATE RESPIRATORY (INHALATION) at 14:19

## 2021-11-28 RX ADMIN — KETOROLAC TROMETHAMINE 30 MG: 30 INJECTION, SOLUTION INTRAMUSCULAR at 13:51

## 2021-11-28 RX ADMIN — Medication 2000 UNITS: at 08:45

## 2021-11-28 RX ADMIN — METOPROLOL TARTRATE 50 MG: 50 TABLET, FILM COATED ORAL at 21:20

## 2021-11-28 RX ADMIN — PANTOPRAZOLE SODIUM 20 MG: 20 TABLET, DELAYED RELEASE ORAL at 08:46

## 2021-11-28 RX ADMIN — FUROSEMIDE 80 MG: 80 TABLET ORAL at 17:09

## 2021-11-28 RX ADMIN — IPRATROPIUM BROMIDE 0.5 MG: 0.5 SOLUTION RESPIRATORY (INHALATION) at 19:17

## 2021-11-28 RX ADMIN — DABIGATRAN ETEXILATE MESYLATE 150 MG: 150 CAPSULE ORAL at 17:09

## 2021-11-28 RX ADMIN — VANCOMYCIN HYDROCHLORIDE 1250 MG: 1 INJECTION, POWDER, LYOPHILIZED, FOR SOLUTION INTRAVENOUS at 17:09

## 2021-11-28 RX ADMIN — SPIRONOLACTONE 50 MG: 25 TABLET ORAL at 08:46

## 2021-11-28 RX ADMIN — LEVOTHYROXINE SODIUM 25 MCG: 25 TABLET ORAL at 06:03

## 2021-11-28 RX ADMIN — BUDESONIDE AND FORMOTEROL FUMARATE DIHYDRATE 2 PUFF: 160; 4.5 AEROSOL RESPIRATORY (INHALATION) at 19:17

## 2021-11-28 RX ADMIN — DABIGATRAN ETEXILATE MESYLATE 150 MG: 150 CAPSULE ORAL at 08:46

## 2021-11-28 RX ADMIN — VANCOMYCIN HYDROCHLORIDE 1250 MG: 1 INJECTION, POWDER, LYOPHILIZED, FOR SOLUTION INTRAVENOUS at 09:23

## 2021-11-28 RX ADMIN — IPRATROPIUM BROMIDE 0.5 MG: 0.5 SOLUTION RESPIRATORY (INHALATION) at 14:19

## 2021-11-28 RX ADMIN — BUDESONIDE AND FORMOTEROL FUMARATE DIHYDRATE 2 PUFF: 160; 4.5 AEROSOL RESPIRATORY (INHALATION) at 07:40

## 2021-11-28 RX ADMIN — DIGOXIN 125 MCG: 125 TABLET ORAL at 08:46

## 2021-11-28 RX ADMIN — LEVALBUTEROL HYDROCHLORIDE 1.25 MG: 1.25 SOLUTION, CONCENTRATE RESPIRATORY (INHALATION) at 07:26

## 2021-11-28 RX ADMIN — IPRATROPIUM BROMIDE 0.5 MG: 0.5 SOLUTION RESPIRATORY (INHALATION) at 07:26

## 2021-11-28 RX ADMIN — LEVALBUTEROL HYDROCHLORIDE 1.25 MG: 1.25 SOLUTION, CONCENTRATE RESPIRATORY (INHALATION) at 19:17

## 2021-11-28 RX ADMIN — CYANOCOBALAMIN TAB 500 MCG 1000 MCG: 500 TAB at 08:46

## 2021-11-28 RX ADMIN — BUPROPION 450 MG: 150 TABLET, EXTENDED RELEASE ORAL at 08:45

## 2021-11-29 ENCOUNTER — APPOINTMENT (INPATIENT)
Dept: ULTRASOUND IMAGING | Facility: HOSPITAL | Age: 51
DRG: 383 | End: 2021-11-29
Payer: COMMERCIAL

## 2021-11-29 LAB
ANION GAP SERPL CALCULATED.3IONS-SCNC: 5 MMOL/L (ref 4–13)
BUN SERPL-MCNC: 13 MG/DL (ref 6–20)
CALCIUM SERPL-MCNC: 8.7 MG/DL (ref 8.4–10.2)
CHLORIDE SERPL-SCNC: 98 MMOL/L (ref 96–108)
CO2 SERPL-SCNC: 35 MMOL/L (ref 22–33)
CREAT SERPL-MCNC: 0.91 MG/DL (ref 0.5–1.2)
GFR SERPL CREATININE-BSD FRML MDRD: 98 ML/MIN/1.73SQ M
GLUCOSE SERPL-MCNC: 101 MG/DL (ref 65–140)
POTASSIUM SERPL-SCNC: 4.4 MMOL/L (ref 3.5–5)
SODIUM SERPL-SCNC: 138 MMOL/L (ref 133–145)

## 2021-11-29 PROCEDURE — 76536 US EXAM OF HEAD AND NECK: CPT

## 2021-11-29 PROCEDURE — 99232 SBSQ HOSP IP/OBS MODERATE 35: CPT | Performed by: INTERNAL MEDICINE

## 2021-11-29 PROCEDURE — 94660 CPAP INITIATION&MGMT: CPT

## 2021-11-29 PROCEDURE — 94760 N-INVAS EAR/PLS OXIMETRY 1: CPT

## 2021-11-29 PROCEDURE — 80048 BASIC METABOLIC PNL TOTAL CA: CPT | Performed by: INTERNAL MEDICINE

## 2021-11-29 PROCEDURE — 99254 IP/OBS CNSLTJ NEW/EST MOD 60: CPT | Performed by: INTERNAL MEDICINE

## 2021-11-29 PROCEDURE — 94640 AIRWAY INHALATION TREATMENT: CPT

## 2021-11-29 RX ORDER — LINEZOLID 600 MG/1
600 TABLET, FILM COATED ORAL EVERY 12 HOURS SCHEDULED
Status: DISCONTINUED | OUTPATIENT
Start: 2021-11-29 | End: 2021-11-30

## 2021-11-29 RX ORDER — ONDANSETRON HYDROCHLORIDE 4 MG/5ML
4 SOLUTION ORAL EVERY 6 HOURS PRN
Status: DISCONTINUED | OUTPATIENT
Start: 2021-11-29 | End: 2021-12-02 | Stop reason: HOSPADM

## 2021-11-29 RX ADMIN — LEVALBUTEROL HYDROCHLORIDE 1.25 MG: 1.25 SOLUTION, CONCENTRATE RESPIRATORY (INHALATION) at 07:58

## 2021-11-29 RX ADMIN — Medication 2000 UNITS: at 08:36

## 2021-11-29 RX ADMIN — ENOXAPARIN SODIUM 40 MG: 40 INJECTION SUBCUTANEOUS at 22:10

## 2021-11-29 RX ADMIN — FUROSEMIDE 80 MG: 80 TABLET ORAL at 18:36

## 2021-11-29 RX ADMIN — CYANOCOBALAMIN TAB 500 MCG 1000 MCG: 500 TAB at 08:36

## 2021-11-29 RX ADMIN — DIGOXIN 125 MCG: 125 TABLET ORAL at 08:36

## 2021-11-29 RX ADMIN — VANCOMYCIN HYDROCHLORIDE 1250 MG: 1 INJECTION, POWDER, LYOPHILIZED, FOR SOLUTION INTRAVENOUS at 01:07

## 2021-11-29 RX ADMIN — DABIGATRAN ETEXILATE MESYLATE 150 MG: 150 CAPSULE ORAL at 08:36

## 2021-11-29 RX ADMIN — IPRATROPIUM BROMIDE 0.5 MG: 0.5 SOLUTION RESPIRATORY (INHALATION) at 07:58

## 2021-11-29 RX ADMIN — LEVOTHYROXINE SODIUM 25 MCG: 25 TABLET ORAL at 05:55

## 2021-11-29 RX ADMIN — LINEZOLID 600 MG: 600 TABLET, FILM COATED ORAL at 22:10

## 2021-11-29 RX ADMIN — LINEZOLID 600 MG: 600 TABLET, FILM COATED ORAL at 11:58

## 2021-11-29 RX ADMIN — SPIRONOLACTONE 50 MG: 25 TABLET ORAL at 08:36

## 2021-11-29 RX ADMIN — LEVALBUTEROL HYDROCHLORIDE 1.25 MG: 1.25 SOLUTION, CONCENTRATE RESPIRATORY (INHALATION) at 20:09

## 2021-11-29 RX ADMIN — BUDESONIDE AND FORMOTEROL FUMARATE DIHYDRATE 2 PUFF: 160; 4.5 AEROSOL RESPIRATORY (INHALATION) at 20:09

## 2021-11-29 RX ADMIN — METOPROLOL TARTRATE 50 MG: 50 TABLET, FILM COATED ORAL at 22:09

## 2021-11-29 RX ADMIN — BUDESONIDE AND FORMOTEROL FUMARATE DIHYDRATE 2 PUFF: 160; 4.5 AEROSOL RESPIRATORY (INHALATION) at 07:58

## 2021-11-29 RX ADMIN — METOPROLOL TARTRATE 50 MG: 50 TABLET, FILM COATED ORAL at 08:36

## 2021-11-29 RX ADMIN — KETOROLAC TROMETHAMINE 30 MG: 30 INJECTION, SOLUTION INTRAMUSCULAR at 05:58

## 2021-11-29 RX ADMIN — LEVALBUTEROL HYDROCHLORIDE 1.25 MG: 1.25 SOLUTION, CONCENTRATE RESPIRATORY (INHALATION) at 14:51

## 2021-11-29 RX ADMIN — FUROSEMIDE 80 MG: 80 TABLET ORAL at 08:36

## 2021-11-29 RX ADMIN — IPRATROPIUM BROMIDE 0.5 MG: 0.5 SOLUTION RESPIRATORY (INHALATION) at 20:09

## 2021-11-29 RX ADMIN — IBUPROFEN 600 MG: 600 TABLET ORAL at 19:12

## 2021-11-29 RX ADMIN — IBUPROFEN 600 MG: 600 TABLET ORAL at 08:36

## 2021-11-29 RX ADMIN — ACETAMINOPHEN 975 MG: 325 TABLET, FILM COATED ORAL at 22:09

## 2021-11-29 RX ADMIN — IPRATROPIUM BROMIDE 0.5 MG: 0.5 SOLUTION RESPIRATORY (INHALATION) at 14:51

## 2021-11-29 RX ADMIN — BUPROPION 450 MG: 150 TABLET, EXTENDED RELEASE ORAL at 08:36

## 2021-11-29 RX ADMIN — PANTOPRAZOLE SODIUM 20 MG: 20 TABLET, DELAYED RELEASE ORAL at 08:36

## 2021-11-30 LAB
ANION GAP SERPL CALCULATED.3IONS-SCNC: 6 MMOL/L (ref 4–13)
BASOPHILS # BLD AUTO: 0.03 THOUSANDS/ΜL (ref 0–0.1)
BASOPHILS NFR BLD AUTO: 0 % (ref 0–1)
BUN SERPL-MCNC: 19 MG/DL (ref 6–20)
CALCIUM SERPL-MCNC: 9.3 MG/DL (ref 8.4–10.2)
CHLORIDE SERPL-SCNC: 99 MMOL/L (ref 96–108)
CO2 SERPL-SCNC: 32 MMOL/L (ref 22–33)
CREAT SERPL-MCNC: 1.03 MG/DL (ref 0.5–1.2)
EOSINOPHIL # BLD AUTO: 0.22 THOUSAND/ΜL (ref 0–0.61)
EOSINOPHIL NFR BLD AUTO: 3 % (ref 0–6)
ERYTHROCYTE [DISTWIDTH] IN BLOOD BY AUTOMATED COUNT: 14.3 % (ref 11.6–15.1)
GFR SERPL CREATININE-BSD FRML MDRD: 84 ML/MIN/1.73SQ M
GLUCOSE SERPL-MCNC: 132 MG/DL (ref 65–140)
HCT VFR BLD AUTO: 39.7 % (ref 36.5–49.3)
HGB BLD-MCNC: 13.1 G/DL (ref 12–17)
IMM GRANULOCYTES # BLD AUTO: 0.02 THOUSAND/UL (ref 0–0.2)
IMM GRANULOCYTES NFR BLD AUTO: 0 % (ref 0–2)
LYMPHOCYTES # BLD AUTO: 1.22 THOUSANDS/ΜL (ref 0.6–4.47)
LYMPHOCYTES NFR BLD AUTO: 17 % (ref 14–44)
MCH RBC QN AUTO: 30.2 PG (ref 26.8–34.3)
MCHC RBC AUTO-ENTMCNC: 33 G/DL (ref 31.4–37.4)
MCV RBC AUTO: 92 FL (ref 82–98)
MONOCYTES # BLD AUTO: 0.56 THOUSAND/ΜL (ref 0.17–1.22)
MONOCYTES NFR BLD AUTO: 8 % (ref 4–12)
NEUTROPHILS # BLD AUTO: 5.31 THOUSANDS/ΜL (ref 1.85–7.62)
NEUTS SEG NFR BLD AUTO: 72 % (ref 43–75)
PLATELET # BLD AUTO: 221 THOUSANDS/UL (ref 149–390)
PMV BLD AUTO: 10.4 FL (ref 8.9–12.7)
POTASSIUM SERPL-SCNC: 4.3 MMOL/L (ref 3.5–5)
RBC # BLD AUTO: 4.34 MILLION/UL (ref 3.88–5.62)
SODIUM SERPL-SCNC: 137 MMOL/L (ref 133–145)
WBC # BLD AUTO: 7.36 THOUSAND/UL (ref 4.31–10.16)

## 2021-11-30 PROCEDURE — 94760 N-INVAS EAR/PLS OXIMETRY 1: CPT

## 2021-11-30 PROCEDURE — 94640 AIRWAY INHALATION TREATMENT: CPT

## 2021-11-30 PROCEDURE — 94660 CPAP INITIATION&MGMT: CPT

## 2021-11-30 PROCEDURE — 80048 BASIC METABOLIC PNL TOTAL CA: CPT | Performed by: INTERNAL MEDICINE

## 2021-11-30 PROCEDURE — 99232 SBSQ HOSP IP/OBS MODERATE 35: CPT | Performed by: PHYSICIAN ASSISTANT

## 2021-11-30 PROCEDURE — 85025 COMPLETE CBC W/AUTO DIFF WBC: CPT | Performed by: INTERNAL MEDICINE

## 2021-11-30 RX ORDER — BUPROPION HYDROCHLORIDE 150 MG/1
450 TABLET ORAL DAILY
Status: DISCONTINUED | OUTPATIENT
Start: 2021-11-30 | End: 2021-12-02 | Stop reason: HOSPADM

## 2021-11-30 RX ADMIN — IPRATROPIUM BROMIDE 0.5 MG: 0.5 SOLUTION RESPIRATORY (INHALATION) at 08:15

## 2021-11-30 RX ADMIN — ACETAMINOPHEN 975 MG: 325 TABLET, FILM COATED ORAL at 19:41

## 2021-11-30 RX ADMIN — ENOXAPARIN SODIUM 40 MG: 40 INJECTION SUBCUTANEOUS at 10:19

## 2021-11-30 RX ADMIN — LEVALBUTEROL HYDROCHLORIDE 1.25 MG: 1.25 SOLUTION, CONCENTRATE RESPIRATORY (INHALATION) at 14:52

## 2021-11-30 RX ADMIN — BUPROPION 450 MG: 150 TABLET, EXTENDED RELEASE ORAL at 19:07

## 2021-11-30 RX ADMIN — ENOXAPARIN SODIUM 40 MG: 40 INJECTION SUBCUTANEOUS at 20:42

## 2021-11-30 RX ADMIN — DIGOXIN 125 MCG: 125 TABLET ORAL at 10:19

## 2021-11-30 RX ADMIN — PANTOPRAZOLE SODIUM 20 MG: 20 TABLET, DELAYED RELEASE ORAL at 10:19

## 2021-11-30 RX ADMIN — SPIRONOLACTONE 50 MG: 25 TABLET ORAL at 10:19

## 2021-11-30 RX ADMIN — LEVALBUTEROL HYDROCHLORIDE 1.25 MG: 1.25 SOLUTION, CONCENTRATE RESPIRATORY (INHALATION) at 08:15

## 2021-11-30 RX ADMIN — VANCOMYCIN HYDROCHLORIDE 1250 MG: 1 INJECTION, POWDER, LYOPHILIZED, FOR SOLUTION INTRAVENOUS at 19:06

## 2021-11-30 RX ADMIN — BUDESONIDE AND FORMOTEROL FUMARATE DIHYDRATE 2 PUFF: 160; 4.5 AEROSOL RESPIRATORY (INHALATION) at 19:53

## 2021-11-30 RX ADMIN — METOPROLOL TARTRATE 50 MG: 50 TABLET, FILM COATED ORAL at 20:42

## 2021-11-30 RX ADMIN — IPRATROPIUM BROMIDE 0.5 MG: 0.5 SOLUTION RESPIRATORY (INHALATION) at 14:52

## 2021-11-30 RX ADMIN — IPRATROPIUM BROMIDE 0.5 MG: 0.5 SOLUTION RESPIRATORY (INHALATION) at 19:53

## 2021-11-30 RX ADMIN — FUROSEMIDE 80 MG: 80 TABLET ORAL at 10:19

## 2021-11-30 RX ADMIN — Medication 2000 UNITS: at 10:19

## 2021-11-30 RX ADMIN — LEVOTHYROXINE SODIUM 25 MCG: 25 TABLET ORAL at 10:19

## 2021-11-30 RX ADMIN — LINEZOLID 600 MG: 600 TABLET, FILM COATED ORAL at 10:20

## 2021-11-30 RX ADMIN — CYANOCOBALAMIN TAB 500 MCG 1000 MCG: 500 TAB at 10:19

## 2021-11-30 RX ADMIN — METOPROLOL TARTRATE 50 MG: 50 TABLET, FILM COATED ORAL at 10:19

## 2021-11-30 RX ADMIN — BUDESONIDE AND FORMOTEROL FUMARATE DIHYDRATE 2 PUFF: 160; 4.5 AEROSOL RESPIRATORY (INHALATION) at 08:15

## 2021-11-30 RX ADMIN — LEVALBUTEROL HYDROCHLORIDE 1.25 MG: 1.25 SOLUTION, CONCENTRATE RESPIRATORY (INHALATION) at 19:53

## 2021-11-30 RX ADMIN — ONDANSETRON HYDROCHLORIDE 4 MG: 4 SOLUTION ORAL at 12:11

## 2021-11-30 RX ADMIN — FUROSEMIDE 80 MG: 80 TABLET ORAL at 19:07

## 2021-12-01 PROBLEM — I95.9 HYPOTENSION: Status: ACTIVE | Noted: 2021-12-01

## 2021-12-01 LAB
ANION GAP SERPL CALCULATED.3IONS-SCNC: 5 MMOL/L (ref 4–13)
BACTERIA BLD CULT: NORMAL
BACTERIA BLD CULT: NORMAL
BASOPHILS # BLD MANUAL: 0 THOUSAND/UL (ref 0–0.1)
BASOPHILS NFR MAR MANUAL: 0 % (ref 0–1)
BUN SERPL-MCNC: 16 MG/DL (ref 6–20)
CALCIUM SERPL-MCNC: 9.1 MG/DL (ref 8.4–10.2)
CHLORIDE SERPL-SCNC: 99 MMOL/L (ref 96–108)
CO2 SERPL-SCNC: 38 MMOL/L (ref 22–33)
CREAT SERPL-MCNC: 1.01 MG/DL (ref 0.5–1.2)
EOSINOPHIL # BLD MANUAL: 0.19 THOUSAND/UL (ref 0–0.4)
EOSINOPHIL NFR BLD MANUAL: 3 % (ref 0–6)
ERYTHROCYTE [DISTWIDTH] IN BLOOD BY AUTOMATED COUNT: 18.1 % (ref 11.6–15.1)
GFR SERPL CREATININE-BSD FRML MDRD: 86 ML/MIN/1.73SQ M
GLUCOSE SERPL-MCNC: 92 MG/DL (ref 65–140)
HCT VFR BLD AUTO: 34.9 % (ref 36.5–49.3)
HGB BLD-MCNC: 11.4 G/DL (ref 12–17)
LYMPHOCYTES # BLD AUTO: 1.86 THOUSAND/UL (ref 0.6–4.47)
LYMPHOCYTES # BLD AUTO: 29 % (ref 14–44)
MCH RBC QN AUTO: 26.2 PG (ref 26.8–34.3)
MCHC RBC AUTO-ENTMCNC: 32.7 G/DL (ref 31.4–37.4)
MCV RBC AUTO: 80 FL (ref 82–98)
MONOCYTES # BLD AUTO: 0.26 THOUSAND/UL (ref 0–1.22)
MONOCYTES NFR BLD: 4 % (ref 4–12)
NEUTROPHILS # BLD MANUAL: 3.72 THOUSAND/UL (ref 1.85–7.62)
NEUTS BAND NFR BLD MANUAL: 2 % (ref 0–8)
NEUTS SEG NFR BLD AUTO: 56 % (ref 43–75)
PLATELET # BLD AUTO: 156 THOUSANDS/UL (ref 149–390)
PLATELET BLD QL SMEAR: ADEQUATE
PMV BLD AUTO: 11.6 FL (ref 8.9–12.7)
POTASSIUM SERPL-SCNC: 3.9 MMOL/L (ref 3.5–5)
PROCALCITONIN SERPL-MCNC: 0.13 NG/ML
RBC # BLD AUTO: 4.35 MILLION/UL (ref 3.88–5.62)
RBC MORPH BLD: NORMAL
SODIUM SERPL-SCNC: 142 MMOL/L (ref 133–145)
VANCOMYCIN TROUGH SERPL-MCNC: 13 UG/ML (ref 10–20)
VARIANT LYMPHS # BLD AUTO: 6 %
WBC # BLD AUTO: 6.42 THOUSAND/UL (ref 4.31–10.16)

## 2021-12-01 PROCEDURE — 94760 N-INVAS EAR/PLS OXIMETRY 1: CPT

## 2021-12-01 PROCEDURE — 85027 COMPLETE CBC AUTOMATED: CPT | Performed by: PHYSICIAN ASSISTANT

## 2021-12-01 PROCEDURE — 10061 I&D ABSCESS COMP/MULTIPLE: CPT | Performed by: SURGERY

## 2021-12-01 PROCEDURE — 99232 SBSQ HOSP IP/OBS MODERATE 35: CPT | Performed by: PHYSICIAN ASSISTANT

## 2021-12-01 PROCEDURE — 84145 PROCALCITONIN (PCT): CPT | Performed by: PHYSICIAN ASSISTANT

## 2021-12-01 PROCEDURE — 94640 AIRWAY INHALATION TREATMENT: CPT

## 2021-12-01 PROCEDURE — 85007 BL SMEAR W/DIFF WBC COUNT: CPT | Performed by: PHYSICIAN ASSISTANT

## 2021-12-01 PROCEDURE — 99232 SBSQ HOSP IP/OBS MODERATE 35: CPT | Performed by: INTERNAL MEDICINE

## 2021-12-01 PROCEDURE — 80202 ASSAY OF VANCOMYCIN: CPT | Performed by: PHYSICIAN ASSISTANT

## 2021-12-01 PROCEDURE — 80048 BASIC METABOLIC PNL TOTAL CA: CPT | Performed by: PHYSICIAN ASSISTANT

## 2021-12-01 RX ORDER — SODIUM CHLORIDE 9 MG/ML
75 INJECTION, SOLUTION INTRAVENOUS CONTINUOUS
Status: DISCONTINUED | OUTPATIENT
Start: 2021-12-01 | End: 2021-12-02 | Stop reason: HOSPADM

## 2021-12-01 RX ADMIN — IPRATROPIUM BROMIDE 0.5 MG: 0.5 SOLUTION RESPIRATORY (INHALATION) at 19:50

## 2021-12-01 RX ADMIN — IPRATROPIUM BROMIDE 0.5 MG: 0.5 SOLUTION RESPIRATORY (INHALATION) at 07:47

## 2021-12-01 RX ADMIN — ACETAMINOPHEN 975 MG: 325 TABLET, FILM COATED ORAL at 18:21

## 2021-12-01 RX ADMIN — Medication 2000 UNITS: at 10:36

## 2021-12-01 RX ADMIN — CYANOCOBALAMIN TAB 500 MCG 1000 MCG: 500 TAB at 10:37

## 2021-12-01 RX ADMIN — ENOXAPARIN SODIUM 40 MG: 40 INJECTION SUBCUTANEOUS at 23:23

## 2021-12-01 RX ADMIN — FUROSEMIDE 80 MG: 80 TABLET ORAL at 17:56

## 2021-12-01 RX ADMIN — BUDESONIDE AND FORMOTEROL FUMARATE DIHYDRATE 2 PUFF: 160; 4.5 AEROSOL RESPIRATORY (INHALATION) at 08:01

## 2021-12-01 RX ADMIN — DIGOXIN 125 MCG: 125 TABLET ORAL at 10:36

## 2021-12-01 RX ADMIN — BUDESONIDE AND FORMOTEROL FUMARATE DIHYDRATE 2 PUFF: 160; 4.5 AEROSOL RESPIRATORY (INHALATION) at 19:53

## 2021-12-01 RX ADMIN — VANCOMYCIN HYDROCHLORIDE 1250 MG: 1 INJECTION, POWDER, LYOPHILIZED, FOR SOLUTION INTRAVENOUS at 00:57

## 2021-12-01 RX ADMIN — VANCOMYCIN HYDROCHLORIDE 1500 MG: 1 INJECTION, POWDER, LYOPHILIZED, FOR SOLUTION INTRAVENOUS at 18:21

## 2021-12-01 RX ADMIN — PANTOPRAZOLE SODIUM 20 MG: 20 TABLET, DELAYED RELEASE ORAL at 10:36

## 2021-12-01 RX ADMIN — ONDANSETRON HYDROCHLORIDE 4 MG: 4 SOLUTION ORAL at 17:58

## 2021-12-01 RX ADMIN — METOPROLOL TARTRATE 50 MG: 50 TABLET, FILM COATED ORAL at 23:06

## 2021-12-01 RX ADMIN — VANCOMYCIN HYDROCHLORIDE 1250 MG: 1 INJECTION, POWDER, LYOPHILIZED, FOR SOLUTION INTRAVENOUS at 10:58

## 2021-12-01 RX ADMIN — BUPROPION 450 MG: 150 TABLET, EXTENDED RELEASE ORAL at 10:36

## 2021-12-01 RX ADMIN — LEVALBUTEROL HYDROCHLORIDE 1.25 MG: 1.25 SOLUTION, CONCENTRATE RESPIRATORY (INHALATION) at 19:50

## 2021-12-01 RX ADMIN — LEVALBUTEROL HYDROCHLORIDE 1.25 MG: 1.25 SOLUTION, CONCENTRATE RESPIRATORY (INHALATION) at 07:47

## 2021-12-01 RX ADMIN — LEVOTHYROXINE SODIUM 25 MCG: 25 TABLET ORAL at 05:09

## 2021-12-01 RX ADMIN — SODIUM CHLORIDE 75 ML/HR: 0.9 INJECTION, SOLUTION INTRAVENOUS at 10:58

## 2021-12-02 VITALS
BODY MASS INDEX: 44.1 KG/M2 | HEIGHT: 71 IN | SYSTOLIC BLOOD PRESSURE: 113 MMHG | DIASTOLIC BLOOD PRESSURE: 62 MMHG | RESPIRATION RATE: 20 BRPM | TEMPERATURE: 97.4 F | OXYGEN SATURATION: 97 % | HEART RATE: 64 BPM | WEIGHT: 315 LBS

## 2021-12-02 LAB
ANION GAP SERPL CALCULATED.3IONS-SCNC: 4 MMOL/L (ref 4–13)
BASOPHILS # BLD AUTO: 0.03 THOUSANDS/ΜL (ref 0–0.1)
BASOPHILS NFR BLD AUTO: 1 % (ref 0–1)
BUN SERPL-MCNC: 13 MG/DL (ref 6–20)
CALCIUM SERPL-MCNC: 9.2 MG/DL (ref 8.4–10.2)
CHLORIDE SERPL-SCNC: 99 MMOL/L (ref 96–108)
CO2 SERPL-SCNC: 38 MMOL/L (ref 22–33)
CREAT SERPL-MCNC: 0.93 MG/DL (ref 0.5–1.2)
EOSINOPHIL # BLD AUTO: 0.21 THOUSAND/ΜL (ref 0–0.61)
EOSINOPHIL NFR BLD AUTO: 4 % (ref 0–6)
ERYTHROCYTE [DISTWIDTH] IN BLOOD BY AUTOMATED COUNT: 14.3 % (ref 11.6–15.1)
GFR SERPL CREATININE-BSD FRML MDRD: 95 ML/MIN/1.73SQ M
GLUCOSE SERPL-MCNC: 90 MG/DL (ref 65–140)
HCT VFR BLD AUTO: 42.1 % (ref 36.5–49.3)
HGB BLD-MCNC: 13.5 G/DL (ref 12–17)
IMM GRANULOCYTES # BLD AUTO: 0.02 THOUSAND/UL (ref 0–0.2)
IMM GRANULOCYTES NFR BLD AUTO: 0 % (ref 0–2)
LYMPHOCYTES # BLD AUTO: 1.29 THOUSANDS/ΜL (ref 0.6–4.47)
LYMPHOCYTES NFR BLD AUTO: 24 % (ref 14–44)
MCH RBC QN AUTO: 29.3 PG (ref 26.8–34.3)
MCHC RBC AUTO-ENTMCNC: 32.1 G/DL (ref 31.4–37.4)
MCV RBC AUTO: 92 FL (ref 82–98)
MONOCYTES # BLD AUTO: 0.38 THOUSAND/ΜL (ref 0.17–1.22)
MONOCYTES NFR BLD AUTO: 7 % (ref 4–12)
NEUTROPHILS # BLD AUTO: 3.57 THOUSANDS/ΜL (ref 1.85–7.62)
NEUTS SEG NFR BLD AUTO: 64 % (ref 43–75)
PLATELET # BLD AUTO: 191 THOUSANDS/UL (ref 149–390)
PMV BLD AUTO: 10 FL (ref 8.9–12.7)
POTASSIUM SERPL-SCNC: 3.8 MMOL/L (ref 3.5–5)
PROCALCITONIN SERPL-MCNC: <0.05 NG/ML
RBC # BLD AUTO: 4.6 MILLION/UL (ref 3.88–5.62)
SODIUM SERPL-SCNC: 141 MMOL/L (ref 133–145)
WBC # BLD AUTO: 5.5 THOUSAND/UL (ref 4.31–10.16)

## 2021-12-02 PROCEDURE — 99232 SBSQ HOSP IP/OBS MODERATE 35: CPT | Performed by: PHYSICIAN ASSISTANT

## 2021-12-02 PROCEDURE — 84145 PROCALCITONIN (PCT): CPT | Performed by: PHYSICIAN ASSISTANT

## 2021-12-02 PROCEDURE — 85025 COMPLETE CBC W/AUTO DIFF WBC: CPT | Performed by: PHYSICIAN ASSISTANT

## 2021-12-02 PROCEDURE — 94640 AIRWAY INHALATION TREATMENT: CPT

## 2021-12-02 PROCEDURE — 94760 N-INVAS EAR/PLS OXIMETRY 1: CPT

## 2021-12-02 PROCEDURE — 80048 BASIC METABOLIC PNL TOTAL CA: CPT | Performed by: PHYSICIAN ASSISTANT

## 2021-12-02 PROCEDURE — 99239 HOSP IP/OBS DSCHRG MGMT >30: CPT | Performed by: PHYSICIAN ASSISTANT

## 2021-12-02 RX ORDER — LINEZOLID 600 MG/1
600 TABLET, FILM COATED ORAL EVERY 12 HOURS SCHEDULED
Qty: 10 TABLET | Refills: 0 | Status: SHIPPED | OUTPATIENT
Start: 2021-12-02 | End: 2021-12-07

## 2021-12-02 RX ADMIN — LEVALBUTEROL HYDROCHLORIDE 1.25 MG: 1.25 SOLUTION, CONCENTRATE RESPIRATORY (INHALATION) at 07:45

## 2021-12-02 RX ADMIN — ACETAMINOPHEN 975 MG: 325 TABLET, FILM COATED ORAL at 02:39

## 2021-12-02 RX ADMIN — ACETAMINOPHEN 975 MG: 325 TABLET, FILM COATED ORAL at 10:25

## 2021-12-02 RX ADMIN — ONDANSETRON HYDROCHLORIDE 4 MG: 4 SOLUTION ORAL at 15:01

## 2021-12-02 RX ADMIN — SPIRONOLACTONE 50 MG: 25 TABLET ORAL at 10:24

## 2021-12-02 RX ADMIN — DIGOXIN 125 MCG: 125 TABLET ORAL at 10:31

## 2021-12-02 RX ADMIN — CYANOCOBALAMIN TAB 500 MCG 1000 MCG: 500 TAB at 10:24

## 2021-12-02 RX ADMIN — ENOXAPARIN SODIUM 40 MG: 40 INJECTION SUBCUTANEOUS at 10:25

## 2021-12-02 RX ADMIN — GABAPENTIN 300 MG: 300 CAPSULE ORAL at 10:24

## 2021-12-02 RX ADMIN — IPRATROPIUM BROMIDE 0.5 MG: 0.5 SOLUTION RESPIRATORY (INHALATION) at 07:45

## 2021-12-02 RX ADMIN — LEVALBUTEROL HYDROCHLORIDE 1.25 MG: 1.25 SOLUTION, CONCENTRATE RESPIRATORY (INHALATION) at 13:36

## 2021-12-02 RX ADMIN — FUROSEMIDE 80 MG: 80 TABLET ORAL at 10:25

## 2021-12-02 RX ADMIN — VANCOMYCIN HYDROCHLORIDE 1500 MG: 1 INJECTION, POWDER, LYOPHILIZED, FOR SOLUTION INTRAVENOUS at 02:41

## 2021-12-02 RX ADMIN — PANTOPRAZOLE SODIUM 20 MG: 20 TABLET, DELAYED RELEASE ORAL at 10:24

## 2021-12-02 RX ADMIN — Medication 2000 UNITS: at 10:25

## 2021-12-02 RX ADMIN — VANCOMYCIN HYDROCHLORIDE 1500 MG: 1 INJECTION, POWDER, LYOPHILIZED, FOR SOLUTION INTRAVENOUS at 14:15

## 2021-12-02 RX ADMIN — METOPROLOL TARTRATE 50 MG: 50 TABLET, FILM COATED ORAL at 10:25

## 2021-12-02 RX ADMIN — BUPROPION 450 MG: 150 TABLET, EXTENDED RELEASE ORAL at 10:24

## 2021-12-02 RX ADMIN — LEVOTHYROXINE SODIUM 25 MCG: 25 TABLET ORAL at 05:48

## 2021-12-02 RX ADMIN — BUDESONIDE AND FORMOTEROL FUMARATE DIHYDRATE 2 PUFF: 160; 4.5 AEROSOL RESPIRATORY (INHALATION) at 07:46

## 2021-12-02 RX ADMIN — IPRATROPIUM BROMIDE 0.5 MG: 0.5 SOLUTION RESPIRATORY (INHALATION) at 13:36

## 2021-12-20 LAB
BACTERIA WND AEROBE CULT: ABNORMAL
GRAM STN SPEC: ABNORMAL
GRAM STN SPEC: ABNORMAL

## 2022-01-01 ENCOUNTER — APPOINTMENT (OUTPATIENT)
Dept: RADIOLOGY | Facility: HOSPITAL | Age: 52
DRG: 720 | End: 2022-01-01
Payer: COMMERCIAL

## 2022-01-01 ENCOUNTER — APPOINTMENT (EMERGENCY)
Dept: CT IMAGING | Facility: HOSPITAL | Age: 52
End: 2022-01-01
Payer: COMMERCIAL

## 2022-01-01 ENCOUNTER — APPOINTMENT (EMERGENCY)
Dept: RADIOLOGY | Facility: HOSPITAL | Age: 52
End: 2022-01-01
Payer: COMMERCIAL

## 2022-01-01 ENCOUNTER — APPOINTMENT (INPATIENT)
Dept: CT IMAGING | Facility: HOSPITAL | Age: 52
DRG: 720 | End: 2022-01-01
Payer: COMMERCIAL

## 2022-01-01 ENCOUNTER — APPOINTMENT (INPATIENT)
Dept: NON INVASIVE DIAGNOSTICS | Facility: HOSPITAL | Age: 52
DRG: 720 | End: 2022-01-01
Payer: COMMERCIAL

## 2022-01-01 ENCOUNTER — HOSPITAL ENCOUNTER (EMERGENCY)
Facility: HOSPITAL | Age: 52
End: 2022-10-16
Attending: EMERGENCY MEDICINE
Payer: COMMERCIAL

## 2022-01-01 ENCOUNTER — HOSPITAL ENCOUNTER (INPATIENT)
Facility: HOSPITAL | Age: 52
LOS: 2 days | DRG: 720 | End: 2022-10-18
Attending: INTERNAL MEDICINE | Admitting: INTERNAL MEDICINE
Payer: COMMERCIAL

## 2022-01-01 VITALS
SYSTOLIC BLOOD PRESSURE: 96 MMHG | BODY MASS INDEX: 55.52 KG/M2 | HEART RATE: 159 BPM | OXYGEN SATURATION: 100 % | RESPIRATION RATE: 16 BRPM | DIASTOLIC BLOOD PRESSURE: 75 MMHG | WEIGHT: 315 LBS

## 2022-01-01 VITALS
WEIGHT: 315 LBS | RESPIRATION RATE: 28 BRPM | HEIGHT: 66 IN | SYSTOLIC BLOOD PRESSURE: 86 MMHG | BODY MASS INDEX: 50.62 KG/M2 | HEART RATE: 121 BPM | DIASTOLIC BLOOD PRESSURE: 15 MMHG | TEMPERATURE: 97.3 F | OXYGEN SATURATION: 93 %

## 2022-01-01 DIAGNOSIS — G93.40 ENCEPHALOPATHY: ICD-10-CM

## 2022-01-01 DIAGNOSIS — E86.0 SEVERE DEHYDRATION: ICD-10-CM

## 2022-01-01 DIAGNOSIS — I46.9 CARDIAC ARREST (HCC): Primary | ICD-10-CM

## 2022-01-01 DIAGNOSIS — L73.9 FOLLICULITIS: ICD-10-CM

## 2022-01-01 DIAGNOSIS — R57.9 SHOCK (HCC): ICD-10-CM

## 2022-01-01 DIAGNOSIS — I46.9 CARDIAC ARREST (HCC): ICD-10-CM

## 2022-01-01 DIAGNOSIS — J96.92 HYPERCAPNIC RESPIRATORY FAILURE (HCC): Primary | ICD-10-CM

## 2022-01-01 DIAGNOSIS — I50.9 ACUTE HEART FAILURE (HCC): ICD-10-CM

## 2022-01-01 LAB
2HR DELTA HS TROPONIN: 85 NG/L
ABO GROUP BLD BPU: NORMAL
ABO GROUP BLD BPU: NORMAL
ABO GROUP BLD: NORMAL
ALBUMIN SERPL BCP-MCNC: 1.9 G/DL (ref 3.5–5)
ALBUMIN SERPL BCP-MCNC: 2.6 G/DL (ref 3.5–5)
ALBUMIN SERPL BCP-MCNC: 2.9 G/DL (ref 3.5–5)
ALP SERPL-CCNC: 138 U/L (ref 34–104)
ALP SERPL-CCNC: 168 U/L (ref 34–104)
ALP SERPL-CCNC: 191 U/L (ref 34–104)
ALT SERPL W P-5'-P-CCNC: 23 U/L (ref 7–52)
ALT SERPL W P-5'-P-CCNC: 33 U/L (ref 7–52)
ALT SERPL W P-5'-P-CCNC: 50 U/L (ref 7–52)
ANION GAP SERPL CALCULATED.3IONS-SCNC: 14 MMOL/L (ref 4–13)
ANION GAP SERPL CALCULATED.3IONS-SCNC: 14 MMOL/L (ref 4–13)
ANION GAP SERPL CALCULATED.3IONS-SCNC: 15 MMOL/L (ref 4–13)
ANION GAP SERPL CALCULATED.3IONS-SCNC: 16 MMOL/L (ref 4–13)
ANION GAP SERPL CALCULATED.3IONS-SCNC: 19 MMOL/L (ref 4–13)
ANION GAP SERPL CALCULATED.3IONS-SCNC: 21 MMOL/L (ref 4–13)
ANION GAP SERPL CALCULATED.3IONS-SCNC: 23 MMOL/L (ref 4–13)
ANION GAP SERPL CALCULATED.3IONS-SCNC: 30 MMOL/L (ref 4–13)
ANION GAP SERPL CALCULATED.3IONS-SCNC: 34 MMOL/L (ref 4–13)
ANION GAP SERPL CALCULATED.3IONS-SCNC: 38 MMOL/L (ref 4–13)
ANISOCYTOSIS BLD QL SMEAR: PRESENT
APTT PPP: 172 SECONDS (ref 23–37)
APTT PPP: 30 SECONDS (ref 23–37)
APTT PPP: 46 SECONDS (ref 23–37)
APTT PPP: 71 SECONDS (ref 23–37)
APTT PPP: >210 SECONDS (ref 23–37)
ARTERIAL PATENCY WRIST A: NO
ASCENDING AORTA: 3.1 CM
AST SERPL W P-5'-P-CCNC: 165 U/L (ref 13–39)
AST SERPL W P-5'-P-CCNC: 50 U/L (ref 13–39)
AST SERPL W P-5'-P-CCNC: 84 U/L (ref 13–39)
ATRIAL RATE: 137 BPM
ATRIAL RATE: 88 BPM
ATRIAL RATE: 93 BPM
BACTERIA UR QL AUTO: ABNORMAL /HPF
BASE EX.OXY STD BLDV CALC-SCNC: 42.9 % (ref 60–80)
BASE EX.OXY STD BLDV CALC-SCNC: 44.8 % (ref 60–80)
BASE EX.OXY STD BLDV CALC-SCNC: 46.5 % (ref 60–80)
BASE EX.OXY STD BLDV CALC-SCNC: 59.8 % (ref 60–80)
BASE EX.OXY STD BLDV CALC-SCNC: 60 % (ref 60–80)
BASE EX.OXY STD BLDV CALC-SCNC: 62.7 % (ref 60–80)
BASE EX.OXY STD BLDV CALC-SCNC: 64.2 % (ref 60–80)
BASE EX.OXY STD BLDV CALC-SCNC: 64.2 % (ref 60–80)
BASE EX.OXY STD BLDV CALC-SCNC: 64.7 % (ref 60–80)
BASE EX.OXY STD BLDV CALC-SCNC: 67.1 % (ref 60–80)
BASE EX.OXY STD BLDV CALC-SCNC: 70.4 % (ref 60–80)
BASE EXCESS BLDA CALC-SCNC: -0.1 MMOL/L
BASE EXCESS BLDA CALC-SCNC: -17.2 MMOL/L
BASE EXCESS BLDA CALC-SCNC: -17.2 MMOL/L
BASE EXCESS BLDA CALC-SCNC: -21.7 MMOL/L
BASE EXCESS BLDA CALC-SCNC: -3.9 MMOL/L
BASE EXCESS BLDA CALC-SCNC: 11.7 MMOL/L
BASE EXCESS BLDA CALC-SCNC: 19 MMOL/L (ref -2–3)
BASE EXCESS BLDA CALC-SCNC: 2.6 MMOL/L
BASE EXCESS BLDA CALC-SCNC: 21 MMOL/L (ref -2–3)
BASE EXCESS BLDA CALC-SCNC: 5.3 MMOL/L
BASE EXCESS BLDA CALC-SCNC: 5.4 MMOL/L
BASE EXCESS BLDV CALC-SCNC: -0.5 MMOL/L
BASE EXCESS BLDV CALC-SCNC: -16.2 MMOL/L
BASE EXCESS BLDV CALC-SCNC: -17.4 MMOL/L
BASE EXCESS BLDV CALC-SCNC: -18.2 MMOL/L
BASE EXCESS BLDV CALC-SCNC: -2.4 MMOL/L
BASE EXCESS BLDV CALC-SCNC: -21.3 MMOL/L
BASE EXCESS BLDV CALC-SCNC: 12.1 MMOL/L
BASE EXCESS BLDV CALC-SCNC: 3 MMOL/L
BASE EXCESS BLDV CALC-SCNC: 3.9 MMOL/L
BASE EXCESS BLDV CALC-SCNC: 4.8 MMOL/L
BASE EXCESS BLDV CALC-SCNC: 7.4 MMOL/L
BASO STIPL BLD QL SMEAR: PRESENT
BASO STIPL BLD QL SMEAR: PRESENT
BASOPHILS # BLD AUTO: 0.04 THOUSANDS/ΜL (ref 0–0.1)
BASOPHILS # BLD MANUAL: 0 THOUSAND/UL (ref 0–0.1)
BASOPHILS # BLD MANUAL: 0.1 THOUSAND/UL (ref 0–0.1)
BASOPHILS NFR BLD AUTO: 1 % (ref 0–1)
BASOPHILS NFR MAR MANUAL: 0 % (ref 0–1)
BASOPHILS NFR MAR MANUAL: 1 % (ref 0–1)
BILIRUB DIRECT SERPL-MCNC: 1.72 MG/DL (ref 0–0.2)
BILIRUB SERPL-MCNC: 3.04 MG/DL (ref 0.2–1)
BILIRUB SERPL-MCNC: 4.31 MG/DL (ref 0.2–1)
BILIRUB SERPL-MCNC: 4.67 MG/DL (ref 0.2–1)
BILIRUB UR QL STRIP: ABNORMAL
BLD GP AB SCN SERPL QL: NEGATIVE
BNP SERPL-MCNC: 89 PG/ML (ref 0–100)
BODY TEMPERATURE: 95.7 DEGREES FEHRENHEIT
BPU ID: NORMAL
BPU ID: NORMAL
BUN SERPL-MCNC: 20 MG/DL (ref 5–25)
BUN SERPL-MCNC: 22 MG/DL (ref 5–25)
BUN SERPL-MCNC: 24 MG/DL (ref 5–25)
BUN SERPL-MCNC: 25 MG/DL (ref 5–25)
BUN SERPL-MCNC: 26 MG/DL (ref 5–25)
BUN SERPL-MCNC: 26 MG/DL (ref 5–25)
BUN SERPL-MCNC: 27 MG/DL (ref 5–25)
BUN SERPL-MCNC: 28 MG/DL (ref 5–25)
BUN SERPL-MCNC: 28 MG/DL (ref 5–25)
BUN SERPL-MCNC: 30 MG/DL (ref 5–25)
BUN SERPL-MCNC: 30 MG/DL (ref 5–25)
CA-I BLD-SCNC: 0.92 MMOL/L (ref 1.12–1.32)
CA-I BLD-SCNC: 0.94 MMOL/L (ref 1.12–1.32)
CA-I BLD-SCNC: 0.97 MMOL/L (ref 1.12–1.32)
CA-I BLD-SCNC: 0.99 MMOL/L (ref 1.12–1.32)
CA-I BLD-SCNC: 1.03 MMOL/L (ref 1.12–1.32)
CA-I BLD-SCNC: 1.04 MMOL/L (ref 1.12–1.32)
CALCIUM ALBUM COR SERPL-MCNC: 8.8 MG/DL (ref 8.3–10.1)
CALCIUM ALBUM COR SERPL-MCNC: 9.7 MG/DL (ref 8.3–10.1)
CALCIUM SERPL-MCNC: 7.5 MG/DL (ref 8.4–10.2)
CALCIUM SERPL-MCNC: 7.7 MG/DL (ref 8.4–10.2)
CALCIUM SERPL-MCNC: 7.7 MG/DL (ref 8.4–10.2)
CALCIUM SERPL-MCNC: 8.2 MG/DL (ref 8.4–10.2)
CALCIUM SERPL-MCNC: 8.3 MG/DL (ref 8.4–10.2)
CALCIUM SERPL-MCNC: 8.3 MG/DL (ref 8.4–10.2)
CALCIUM SERPL-MCNC: 8.5 MG/DL (ref 8.4–10.2)
CALCIUM SERPL-MCNC: 8.6 MG/DL (ref 8.4–10.2)
CALCIUM SERPL-MCNC: 8.8 MG/DL (ref 8.4–10.2)
CARDIAC TROPONIN I PNL SERPL HS: 180 NG/L
CARDIAC TROPONIN I PNL SERPL HS: 200 NG/L (ref 8–18)
CARDIAC TROPONIN I PNL SERPL HS: 95 NG/L
CHLORIDE SERPL-SCNC: 77 MMOL/L (ref 96–108)
CHLORIDE SERPL-SCNC: 81 MMOL/L (ref 96–108)
CHLORIDE SERPL-SCNC: 84 MMOL/L (ref 96–108)
CHLORIDE SERPL-SCNC: 88 MMOL/L (ref 96–108)
CHLORIDE SERPL-SCNC: 90 MMOL/L (ref 96–108)
CHLORIDE SERPL-SCNC: 91 MMOL/L (ref 96–108)
CHLORIDE SERPL-SCNC: 92 MMOL/L (ref 96–108)
CHLORIDE SERPL-SCNC: 93 MMOL/L (ref 96–108)
CHLORIDE SERPL-SCNC: 95 MMOL/L (ref 96–108)
CHLORIDE SERPL-SCNC: 96 MMOL/L (ref 96–108)
CHLORIDE SERPL-SCNC: 96 MMOL/L (ref 96–108)
CK SERPL-CCNC: 20 U/L (ref 39–308)
CLARITY UR: ABNORMAL
CO2 SERPL-SCNC: 10 MMOL/L (ref 21–32)
CO2 SERPL-SCNC: 20 MMOL/L (ref 21–32)
CO2 SERPL-SCNC: 23 MMOL/L (ref 21–32)
CO2 SERPL-SCNC: 28 MMOL/L (ref 21–32)
CO2 SERPL-SCNC: 29 MMOL/L (ref 21–32)
CO2 SERPL-SCNC: 30 MMOL/L (ref 21–32)
CO2 SERPL-SCNC: 31 MMOL/L (ref 21–32)
CO2 SERPL-SCNC: 39 MMOL/L (ref 21–32)
CO2 SERPL-SCNC: 8 MMOL/L (ref 21–32)
CO2 SERPL-SCNC: 9 MMOL/L (ref 21–32)
CO2 SERPL-SCNC: >45 MMOL/L (ref 21–32)
COLOR UR: ABNORMAL
CREAT SERPL-MCNC: 0.97 MG/DL (ref 0.6–1.3)
CREAT SERPL-MCNC: 0.98 MG/DL (ref 0.6–1.3)
CREAT SERPL-MCNC: 1.01 MG/DL (ref 0.6–1.3)
CREAT SERPL-MCNC: 1.01 MG/DL (ref 0.6–1.3)
CREAT SERPL-MCNC: 1.07 MG/DL (ref 0.6–1.3)
CREAT SERPL-MCNC: 1.14 MG/DL (ref 0.6–1.3)
CREAT SERPL-MCNC: 1.36 MG/DL (ref 0.6–1.3)
CREAT SERPL-MCNC: 1.43 MG/DL (ref 0.6–1.3)
CREAT SERPL-MCNC: 1.73 MG/DL (ref 0.6–1.3)
CREAT SERPL-MCNC: 1.73 MG/DL (ref 0.6–1.3)
CREAT SERPL-MCNC: 1.82 MG/DL (ref 0.6–1.3)
CROSSMATCH: NORMAL
CROSSMATCH: NORMAL
DIGOXIN SERPL-MCNC: 0.5 NG/ML (ref 0.8–2)
DIGOXIN SERPL-MCNC: <0.5 NG/ML (ref 0.8–2)
EOSINOPHIL # BLD AUTO: 0 THOUSAND/ΜL (ref 0–0.61)
EOSINOPHIL # BLD MANUAL: 0 THOUSAND/UL (ref 0–0.4)
EOSINOPHIL NFR BLD AUTO: 0 % (ref 0–6)
EOSINOPHIL NFR BLD MANUAL: 0 % (ref 0–6)
ERYTHROCYTE [DISTWIDTH] IN BLOOD BY AUTOMATED COUNT: 15.3 % (ref 11.6–15.1)
ERYTHROCYTE [DISTWIDTH] IN BLOOD BY AUTOMATED COUNT: 15.4 % (ref 11.6–15.1)
ERYTHROCYTE [DISTWIDTH] IN BLOOD BY AUTOMATED COUNT: 15.5 % (ref 11.6–15.1)
ERYTHROCYTE [DISTWIDTH] IN BLOOD BY AUTOMATED COUNT: 16.2 % (ref 11.6–15.1)
ERYTHROCYTE [DISTWIDTH] IN BLOOD BY AUTOMATED COUNT: 16.2 % (ref 11.6–15.1)
FIO2 GAS DIL.REBREATH: 100 L
FIO2 GAS DIL.REBREATH: 45 L
GFR SERPL CREATININE-BSD FRML MDRD: 42 ML/MIN/1.73SQ M
GFR SERPL CREATININE-BSD FRML MDRD: 44 ML/MIN/1.73SQ M
GFR SERPL CREATININE-BSD FRML MDRD: 44 ML/MIN/1.73SQ M
GFR SERPL CREATININE-BSD FRML MDRD: 56 ML/MIN/1.73SQ M
GFR SERPL CREATININE-BSD FRML MDRD: 59 ML/MIN/1.73SQ M
GFR SERPL CREATININE-BSD FRML MDRD: 74 ML/MIN/1.73SQ M
GFR SERPL CREATININE-BSD FRML MDRD: 79 ML/MIN/1.73SQ M
GFR SERPL CREATININE-BSD FRML MDRD: 85 ML/MIN/1.73SQ M
GFR SERPL CREATININE-BSD FRML MDRD: 85 ML/MIN/1.73SQ M
GFR SERPL CREATININE-BSD FRML MDRD: 88 ML/MIN/1.73SQ M
GFR SERPL CREATININE-BSD FRML MDRD: 90 ML/MIN/1.73SQ M
GLUCOSE SERPL-MCNC: 103 MG/DL (ref 65–140)
GLUCOSE SERPL-MCNC: 106 MG/DL (ref 65–140)
GLUCOSE SERPL-MCNC: 109 MG/DL (ref 65–140)
GLUCOSE SERPL-MCNC: 109 MG/DL (ref 65–140)
GLUCOSE SERPL-MCNC: 113 MG/DL (ref 65–140)
GLUCOSE SERPL-MCNC: 114 MG/DL (ref 65–140)
GLUCOSE SERPL-MCNC: 116 MG/DL (ref 65–140)
GLUCOSE SERPL-MCNC: 120 MG/DL (ref 65–140)
GLUCOSE SERPL-MCNC: 122 MG/DL (ref 65–140)
GLUCOSE SERPL-MCNC: 127 MG/DL (ref 65–140)
GLUCOSE SERPL-MCNC: 129 MG/DL (ref 65–140)
GLUCOSE SERPL-MCNC: 130 MG/DL (ref 65–140)
GLUCOSE SERPL-MCNC: 149 MG/DL (ref 65–140)
GLUCOSE SERPL-MCNC: 150 MG/DL (ref 65–140)
GLUCOSE SERPL-MCNC: 161 MG/DL (ref 65–140)
GLUCOSE SERPL-MCNC: 175 MG/DL (ref 65–140)
GLUCOSE SERPL-MCNC: 209 MG/DL (ref 65–140)
GLUCOSE SERPL-MCNC: 232 MG/DL (ref 65–140)
GLUCOSE SERPL-MCNC: 24 MG/DL (ref 65–140)
GLUCOSE SERPL-MCNC: 240 MG/DL (ref 65–140)
GLUCOSE SERPL-MCNC: 249 MG/DL (ref 65–140)
GLUCOSE SERPL-MCNC: 257 MG/DL (ref 65–140)
GLUCOSE SERPL-MCNC: 258 MG/DL (ref 65–140)
GLUCOSE SERPL-MCNC: 281 MG/DL (ref 65–140)
GLUCOSE SERPL-MCNC: 37 MG/DL (ref 65–140)
GLUCOSE SERPL-MCNC: 72 MG/DL (ref 65–140)
GLUCOSE SERPL-MCNC: 84 MG/DL (ref 65–140)
GLUCOSE SERPL-MCNC: 86 MG/DL (ref 65–140)
GLUCOSE SERPL-MCNC: 89 MG/DL (ref 65–140)
GLUCOSE SERPL-MCNC: 91 MG/DL (ref 65–140)
GLUCOSE SERPL-MCNC: 92 MG/DL (ref 65–140)
GLUCOSE SERPL-MCNC: 98 MG/DL (ref 65–140)
GLUCOSE UR STRIP-MCNC: NEGATIVE MG/DL
HCO3 BLDA-SCNC: 10.1 MMOL/L (ref 22–28)
HCO3 BLDA-SCNC: 10.4 MMOL/L (ref 22–28)
HCO3 BLDA-SCNC: 21.1 MMOL/L (ref 22–28)
HCO3 BLDA-SCNC: 24.7 MMOL/L (ref 22–28)
HCO3 BLDA-SCNC: 28.2 MMOL/L (ref 22–28)
HCO3 BLDA-SCNC: 31 MMOL/L (ref 22–28)
HCO3 BLDA-SCNC: 32.9 MMOL/L (ref 22–28)
HCO3 BLDA-SCNC: 37.6 MMOL/L (ref 22–28)
HCO3 BLDA-SCNC: 48.2 MMOL/L (ref 24–30)
HCO3 BLDA-SCNC: 53.6 MMOL/L (ref 22–28)
HCO3 BLDA-SCNC: 6.8 MMOL/L (ref 22–28)
HCO3 BLDV-SCNC: 11.4 MMOL/L (ref 24–30)
HCO3 BLDV-SCNC: 11.5 MMOL/L (ref 24–30)
HCO3 BLDV-SCNC: 11.9 MMOL/L (ref 24–30)
HCO3 BLDV-SCNC: 24.6 MMOL/L (ref 24–30)
HCO3 BLDV-SCNC: 25.4 MMOL/L (ref 24–30)
HCO3 BLDV-SCNC: 30.9 MMOL/L (ref 24–30)
HCO3 BLDV-SCNC: 31.2 MMOL/L (ref 24–30)
HCO3 BLDV-SCNC: 31.9 MMOL/L (ref 24–30)
HCO3 BLDV-SCNC: 34.5 MMOL/L (ref 24–30)
HCO3 BLDV-SCNC: 42.2 MMOL/L (ref 24–30)
HCO3 BLDV-SCNC: 8.2 MMOL/L (ref 24–30)
HCT VFR BLD AUTO: 20 % (ref 36.5–49.3)
HCT VFR BLD AUTO: 20.7 % (ref 36.5–49.3)
HCT VFR BLD AUTO: 32.3 % (ref 36.5–49.3)
HCT VFR BLD AUTO: 32.8 % (ref 36.5–49.3)
HCT VFR BLD AUTO: 46.8 % (ref 36.5–49.3)
HCT VFR BLD CALC: 34 % (ref 36.5–49.3)
HCT VFR BLD CALC: 47 % (ref 36.5–49.3)
HGB BLD-MCNC: 10 G/DL (ref 12–17)
HGB BLD-MCNC: 13.8 G/DL (ref 12–17)
HGB BLD-MCNC: 5.9 G/DL (ref 12–17)
HGB BLD-MCNC: 5.9 G/DL (ref 12–17)
HGB BLD-MCNC: 9.8 G/DL (ref 12–17)
HGB BLDA-MCNC: 11.6 G/DL (ref 12–17)
HGB BLDA-MCNC: 16 G/DL (ref 12–17)
HGB UR QL STRIP.AUTO: ABNORMAL
HOROWITZ INDEX BLDA+IHG-RTO: 45 MM[HG]
HOROWITZ INDEX BLDA+IHG-RTO: 55 MM[HG]
HOROWITZ INDEX BLDA+IHG-RTO: 60 MM[HG]
HOROWITZ INDEX BLDA+IHG-RTO: 60 MM[HG]
HYPERCHROMIA BLD QL SMEAR: PRESENT
HYPERCHROMIA BLD QL SMEAR: PRESENT
I-TIME: 0.8
IMM GRANULOCYTES # BLD AUTO: 0.09 THOUSAND/UL (ref 0–0.2)
IMM GRANULOCYTES NFR BLD AUTO: 2 % (ref 0–2)
INR PPP: 1.16 (ref 0.84–1.19)
INR PPP: 1.2 (ref 0.84–1.19)
INR PPP: 2.74 (ref 0.84–1.19)
KETONES UR STRIP-MCNC: NEGATIVE MG/DL
LACTATE SERPL-SCNC: 1.7 MMOL/L (ref 0.5–2)
LACTATE SERPL-SCNC: 11 MMOL/L (ref 0.5–2)
LACTATE SERPL-SCNC: 19.3 MMOL/L (ref 0.5–2)
LACTATE SERPL-SCNC: 21.1 MMOL/L (ref 0.5–2)
LACTATE SERPL-SCNC: 23.3 MMOL/L (ref 0.5–2)
LACTATE SERPL-SCNC: 28.7 MMOL/L (ref 0.5–2)
LACTATE SERPL-SCNC: 3.9 MMOL/L (ref 0.5–2)
LACTATE SERPL-SCNC: 7.7 MMOL/L (ref 0.5–2)
LACTATE SERPL-SCNC: 7.8 MMOL/L (ref 0.5–2)
LACTATE SERPL-SCNC: 7.8 MMOL/L (ref 0.5–2)
LACTATE SERPL-SCNC: 7.9 MMOL/L (ref 0.5–2)
LACTATE SERPL-SCNC: 8.5 MMOL/L (ref 0.5–2)
LACTATE SERPL-SCNC: 9.5 MMOL/L (ref 0.5–2)
LACTATE SERPL-SCNC: 9.9 MMOL/L (ref 0.5–2)
LEUKOCYTE ESTERASE UR QL STRIP: ABNORMAL
LG PLATELETS BLD QL SMEAR: PRESENT
LG PLATELETS BLD QL SMEAR: PRESENT
LYMPHOCYTES # BLD AUTO: 0.71 THOUSANDS/ΜL (ref 0.6–4.47)
LYMPHOCYTES # BLD AUTO: 0.93 THOUSAND/UL (ref 0.6–4.47)
LYMPHOCYTES # BLD AUTO: 1.12 THOUSAND/UL (ref 0.6–4.47)
LYMPHOCYTES # BLD AUTO: 1.16 THOUSAND/UL (ref 0.6–4.47)
LYMPHOCYTES # BLD AUTO: 11 % (ref 14–44)
LYMPHOCYTES # BLD AUTO: 13 % (ref 14–44)
LYMPHOCYTES # BLD AUTO: 14 % (ref 14–44)
LYMPHOCYTES # BLD AUTO: 16 % (ref 14–44)
LYMPHOCYTES # BLD AUTO: 2.21 THOUSAND/UL (ref 0.6–4.47)
LYMPHOCYTES NFR BLD AUTO: 14 % (ref 14–44)
MACROCYTES BLD QL AUTO: PRESENT
MAGNESIUM SERPL-MCNC: 2 MG/DL (ref 1.9–2.7)
MAGNESIUM SERPL-MCNC: 2.1 MG/DL (ref 1.9–2.7)
MAGNESIUM SERPL-MCNC: 2.1 MG/DL (ref 1.9–2.7)
MAGNESIUM SERPL-MCNC: 2.2 MG/DL (ref 1.9–2.7)
MAGNESIUM SERPL-MCNC: 2.2 MG/DL (ref 1.9–2.7)
MAGNESIUM SERPL-MCNC: 2.3 MG/DL (ref 1.9–2.7)
MAGNESIUM SERPL-MCNC: 2.3 MG/DL (ref 1.9–2.7)
MAGNESIUM SERPL-MCNC: 2.4 MG/DL (ref 1.9–2.7)
MAGNESIUM SERPL-MCNC: 2.5 MG/DL (ref 1.9–2.7)
MAGNESIUM SERPL-MCNC: 3.4 MG/DL (ref 1.9–2.7)
MCH RBC QN AUTO: 28.5 PG (ref 26.8–34.3)
MCH RBC QN AUTO: 29.1 PG (ref 26.8–34.3)
MCH RBC QN AUTO: 29.2 PG (ref 26.8–34.3)
MCH RBC QN AUTO: 29.4 PG (ref 26.8–34.3)
MCH RBC QN AUTO: 29.4 PG (ref 26.8–34.3)
MCHC RBC AUTO-ENTMCNC: 28.5 G/DL (ref 31.4–37.4)
MCHC RBC AUTO-ENTMCNC: 29 G/DL (ref 31.4–37.4)
MCHC RBC AUTO-ENTMCNC: 29.5 G/DL (ref 31.4–37.4)
MCHC RBC AUTO-ENTMCNC: 29.9 G/DL (ref 31.4–37.4)
MCHC RBC AUTO-ENTMCNC: 31 G/DL (ref 31.4–37.4)
MCV RBC AUTO: 102 FL (ref 82–98)
MCV RBC AUTO: 103 FL (ref 82–98)
MCV RBC AUTO: 94 FL (ref 82–98)
MCV RBC AUTO: 97 FL (ref 82–98)
MCV RBC AUTO: 99 FL (ref 82–98)
METAMYELOCYTES NFR BLD MANUAL: 1 % (ref 0–1)
MONOCYTES # BLD AUTO: 0.12 THOUSAND/UL (ref 0–1.22)
MONOCYTES # BLD AUTO: 0.26 THOUSAND/ΜL (ref 0.17–1.22)
MONOCYTES # BLD AUTO: 0.27 THOUSAND/UL (ref 0–1.22)
MONOCYTES # BLD AUTO: 0.32 THOUSAND/UL (ref 0–1.22)
MONOCYTES # BLD AUTO: 0.51 THOUSAND/UL (ref 0–1.22)
MONOCYTES NFR BLD AUTO: 5 % (ref 4–12)
MONOCYTES NFR BLD: 2 % (ref 4–12)
MONOCYTES NFR BLD: 2 % (ref 4–12)
MONOCYTES NFR BLD: 3 % (ref 4–12)
MONOCYTES NFR BLD: 5 % (ref 4–12)
MRSA NOSE QL CULT: ABNORMAL
MRSA NOSE QL CULT: ABNORMAL
MYELOCYTES NFR BLD MANUAL: 5 % (ref 0–1)
NEUTROPHILS # BLD AUTO: 4.17 THOUSANDS/ΜL (ref 1.85–7.62)
NEUTROPHILS # BLD MANUAL: 13.23 THOUSAND/UL (ref 1.85–7.62)
NEUTROPHILS # BLD MANUAL: 4.4 THOUSAND/UL (ref 1.85–7.62)
NEUTROPHILS # BLD MANUAL: 7.53 THOUSAND/UL (ref 1.85–7.62)
NEUTROPHILS # BLD MANUAL: 8.44 THOUSAND/UL (ref 1.85–7.62)
NEUTS BAND NFR BLD MANUAL: 26 % (ref 0–8)
NEUTS BAND NFR BLD MANUAL: 39 % (ref 0–8)
NEUTS BAND NFR BLD MANUAL: 4 % (ref 0–8)
NEUTS SEG NFR BLD AUTO: 45 % (ref 43–75)
NEUTS SEG NFR BLD AUTO: 57 % (ref 43–75)
NEUTS SEG NFR BLD AUTO: 76 % (ref 43–75)
NEUTS SEG NFR BLD AUTO: 78 % (ref 43–75)
NEUTS SEG NFR BLD AUTO: 80 % (ref 43–75)
NITRITE UR QL STRIP: NEGATIVE
NON-SQ EPI CELLS URNS QL MICRO: ABNORMAL /HPF
NRBC BLD AUTO-RTO: 21 /100 WBC (ref 0–2)
NRBC BLD AUTO-RTO: 24 /100 WBCS
NRBC BLD AUTO-RTO: 26 /100 WBC (ref 0–2)
NRBC BLD AUTO-RTO: 45 /100 WBC (ref 0–2)
NRBC BLD AUTO-RTO: 7 /100 WBC (ref 0–2)
O2 CT BLDA-SCNC: 10 ML/DL (ref 16–23)
O2 CT BLDA-SCNC: 11.2 ML/DL (ref 16–23)
O2 CT BLDA-SCNC: 14.1 ML/DL (ref 16–23)
O2 CT BLDA-SCNC: 14.3 ML/DL (ref 16–23)
O2 CT BLDA-SCNC: 14.4 ML/DL (ref 16–23)
O2 CT BLDA-SCNC: 14.4 ML/DL (ref 16–23)
O2 CT BLDA-SCNC: 15.5 ML/DL (ref 16–23)
O2 CT BLDA-SCNC: 17.4 ML/DL (ref 16–23)
O2 CT BLDA-SCNC: 8.3 ML/DL (ref 16–23)
O2 CT BLDV-SCNC: 11.9 ML/DL
O2 CT BLDV-SCNC: 12.3 ML/DL
O2 CT BLDV-SCNC: 5.4 ML/DL
O2 CT BLDV-SCNC: 5.7 ML/DL
O2 CT BLDV-SCNC: 6.6 ML/DL
O2 CT BLDV-SCNC: 7 ML/DL
O2 CT BLDV-SCNC: 7.1 ML/DL
O2 CT BLDV-SCNC: 7.3 ML/DL
O2 CT BLDV-SCNC: 9.4 ML/DL
O2 CT BLDV-SCNC: 9.7 ML/DL
O2 CT BLDV-SCNC: 9.9 ML/DL
OXYHGB MFR BLDA: 89.5 % (ref 94–97)
OXYHGB MFR BLDA: 92.5 % (ref 94–97)
OXYHGB MFR BLDA: 94.1 % (ref 94–97)
OXYHGB MFR BLDA: 96.9 % (ref 94–97)
OXYHGB MFR BLDA: 97.3 % (ref 94–97)
OXYHGB MFR BLDA: 97.8 % (ref 94–97)
OXYHGB MFR BLDA: 98.2 % (ref 94–97)
OXYHGB MFR BLDA: 98.3 % (ref 94–97)
OXYHGB MFR BLDA: 98.5 % (ref 94–97)
PCO2 BLD: 87.8 MM HG (ref 42–50)
PCO2 BLD: 95.2 MM HG (ref 36–44)
PCO2 BLD: >45 MMOL/L (ref 21–32)
PCO2 BLD: >45 MMOL/L (ref 21–32)
PCO2 BLDA: 25.2 MM HG (ref 36–44)
PCO2 BLDA: 29.4 MM HG (ref 36–44)
PCO2 BLDA: 31.5 MM HG (ref 36–44)
PCO2 BLDA: 38.2 MM HG (ref 36–44)
PCO2 BLDA: 41.1 MM HG (ref 36–44)
PCO2 BLDA: 48 MM HG (ref 36–44)
PCO2 BLDA: 50.6 MM HG (ref 36–44)
PCO2 BLDA: 56.5 MM HG (ref 36–44)
PCO2 BLDA: 61.2 MM HG (ref 36–44)
PCO2 BLDV: 34.8 MM HG (ref 42–50)
PCO2 BLDV: 38.5 MM HG (ref 42–50)
PCO2 BLDV: 40.1 MM HG (ref 42–50)
PCO2 BLDV: 44.2 MM HG (ref 42–50)
PCO2 BLDV: 47.1 MM HG (ref 42–50)
PCO2 BLDV: 52.4 MM HG (ref 42–50)
PCO2 BLDV: 55.6 MM HG (ref 42–50)
PCO2 BLDV: 59.6 MM HG (ref 42–50)
PCO2 BLDV: 63.4 MM HG (ref 42–50)
PCO2 BLDV: 67.2 MM HG (ref 42–50)
PCO2 BLDV: 84.5 MM HG (ref 42–50)
PEEP RESPIRATORY: 10 CM[H2O]
PEEP RESPIRATORY: 8 CM[H2O]
PEEP RESPIRATORY: 8 CM[H2O]
PH BLD: 7.35 [PH] (ref 7.3–7.4)
PH BLD: 7.36 [PH] (ref 7.35–7.45)
PH BLDA: 7.05 [PH] (ref 7.35–7.45)
PH BLDA: 7.14 [PH] (ref 7.35–7.45)
PH BLDA: 7.15 [PH] (ref 7.35–7.45)
PH BLDA: 7.35 [PH] (ref 7.35–7.45)
PH BLDA: 7.36 [PH] (ref 7.35–7.45)
PH BLDA: 7.39 [PH] (ref 7.35–7.45)
PH BLDA: 7.4 [PH] (ref 7.35–7.45)
PH BLDA: 7.41 [PH] (ref 7.35–7.45)
PH BLDA: 7.44 [PH] (ref 7.35–7.45)
PH BLDV: 6.99 [PH] (ref 7.3–7.4)
PH BLDV: 7.03 [PH] (ref 7.3–7.4)
PH BLDV: 7.07 [PH] (ref 7.3–7.4)
PH BLDV: 7.11 [PH] (ref 7.3–7.4)
PH BLDV: 7.29 [PH] (ref 7.3–7.4)
PH BLDV: 7.29 [PH] (ref 7.3–7.4)
PH BLDV: 7.32 [PH] (ref 7.3–7.4)
PH BLDV: 7.33 [PH] (ref 7.3–7.4)
PH BLDV: 7.35 [PH] (ref 7.3–7.4)
PH BLDV: 7.35 [PH] (ref 7.3–7.4)
PH BLDV: 7.37 [PH] (ref 7.3–7.4)
PH UR STRIP.AUTO: 5.5 [PH]
PHOSPHATE SERPL-MCNC: 5.3 MG/DL (ref 2.7–4.5)
PHOSPHATE SERPL-MCNC: 5.5 MG/DL (ref 2.7–4.5)
PHOSPHATE SERPL-MCNC: 5.7 MG/DL (ref 2.7–4.5)
PHOSPHATE SERPL-MCNC: 6.6 MG/DL (ref 2.7–4.5)
PLATELET # BLD AUTO: 147 THOUSANDS/UL (ref 149–390)
PLATELET # BLD AUTO: 159 THOUSANDS/UL (ref 149–390)
PLATELET # BLD AUTO: 64 THOUSANDS/UL (ref 149–390)
PLATELET # BLD AUTO: 65 THOUSANDS/UL (ref 149–390)
PLATELET # BLD AUTO: 96 THOUSANDS/UL (ref 149–390)
PLATELET BLD QL SMEAR: ABNORMAL
PLATELET BLD QL SMEAR: ABNORMAL
PLATELET BLD QL SMEAR: ADEQUATE
PLATELET BLD QL SMEAR: ADEQUATE
PMV BLD AUTO: 10.4 FL (ref 8.9–12.7)
PMV BLD AUTO: 10.8 FL (ref 8.9–12.7)
PMV BLD AUTO: 11.3 FL (ref 8.9–12.7)
PMV BLD AUTO: 9.4 FL (ref 8.9–12.7)
PMV BLD AUTO: 9.7 FL (ref 8.9–12.7)
PO2 BLD: 37 MM HG (ref 35–45)
PO2 BLD: >400 MM HG (ref 75–129)
PO2 BLDA: 156.4 MM HG (ref 75–129)
PO2 BLDA: 174.7 MM HG (ref 75–129)
PO2 BLDA: 178.1 MM HG (ref 75–129)
PO2 BLDA: 182.8 MM HG (ref 75–129)
PO2 BLDA: 200.7 MM HG (ref 75–129)
PO2 BLDA: 202.7 MM HG (ref 75–129)
PO2 BLDA: 66.4 MM HG (ref 75–129)
PO2 BLDA: 68.7 MM HG (ref 75–129)
PO2 BLDA: 81.9 MM HG (ref 75–129)
PO2 BLDV: 28.6 MM HG (ref 35–45)
PO2 BLDV: 28.7 MM HG (ref 35–45)
PO2 BLDV: 29 MM HG (ref 35–45)
PO2 BLDV: 37.1 MM HG (ref 35–45)
PO2 BLDV: 38.6 MM HG (ref 35–45)
PO2 BLDV: 38.8 MM HG (ref 35–45)
PO2 BLDV: 43.6 MM HG (ref 35–45)
PO2 BLDV: 44.1 MM HG (ref 35–45)
PO2 BLDV: 46.6 MM HG (ref 35–45)
PO2 BLDV: 48.5 MM HG (ref 35–45)
PO2 BLDV: 50.9 MM HG (ref 35–45)
POLYCHROMASIA BLD QL SMEAR: PRESENT
POTASSIUM BLD-SCNC: 3 MMOL/L (ref 3.5–5.3)
POTASSIUM BLD-SCNC: 3.3 MMOL/L (ref 3.5–5.3)
POTASSIUM SERPL-SCNC: 2.9 MMOL/L (ref 3.5–5.3)
POTASSIUM SERPL-SCNC: 3.3 MMOL/L (ref 3.5–5.3)
POTASSIUM SERPL-SCNC: 3.6 MMOL/L (ref 3.5–5.3)
POTASSIUM SERPL-SCNC: 3.6 MMOL/L (ref 3.5–5.3)
POTASSIUM SERPL-SCNC: 3.7 MMOL/L (ref 3.5–5.3)
POTASSIUM SERPL-SCNC: 4.2 MMOL/L (ref 3.5–5.3)
POTASSIUM SERPL-SCNC: 4.3 MMOL/L (ref 3.5–5.3)
POTASSIUM SERPL-SCNC: 4.3 MMOL/L (ref 3.5–5.3)
POTASSIUM SERPL-SCNC: 4.8 MMOL/L (ref 3.5–5.3)
POTASSIUM SERPL-SCNC: 4.9 MMOL/L (ref 3.5–5.3)
POTASSIUM SERPL-SCNC: 5.1 MMOL/L (ref 3.5–5.3)
PR INTERVAL: 165 MS
PRESSURE CONTROL: 25
PROCALCITONIN SERPL-MCNC: 0.36 NG/ML
PROCALCITONIN SERPL-MCNC: 0.8 NG/ML
PROT SERPL-MCNC: 5 G/DL (ref 6.4–8.4)
PROT SERPL-MCNC: 7 G/DL (ref 6.4–8.4)
PROT SERPL-MCNC: 7.7 G/DL (ref 6.4–8.4)
PROT UR STRIP-MCNC: ABNORMAL MG/DL
PROTHROMBIN TIME: 15.1 SECONDS (ref 11.6–14.5)
PROTHROMBIN TIME: 15.5 SECONDS (ref 11.6–14.5)
PROTHROMBIN TIME: 29.2 SECONDS (ref 11.6–14.5)
PS CM H2O: 25
PS VENT FIO2: 50
PS VENT PEEP: 10
QRS AXIS: 61 DEGREES
QRS AXIS: 68 DEGREES
QRS AXIS: 69 DEGREES
QRSD INTERVAL: 134 MS
QRSD INTERVAL: 136 MS
QRSD INTERVAL: 149 MS
QT INTERVAL: 350 MS
QT INTERVAL: 354 MS
QT INTERVAL: 403 MS
QTC INTERVAL: 496 MS
QTC INTERVAL: 520 MS
QTC INTERVAL: 534 MS
RBC # BLD AUTO: 1.97 MILLION/UL (ref 3.88–5.62)
RBC # BLD AUTO: 2.02 MILLION/UL (ref 3.88–5.62)
RBC # BLD AUTO: 3.33 MILLION/UL (ref 3.88–5.62)
RBC # BLD AUTO: 3.44 MILLION/UL (ref 3.88–5.62)
RBC # BLD AUTO: 4.84 MILLION/UL (ref 3.88–5.62)
RBC #/AREA URNS AUTO: ABNORMAL /HPF
RBC MORPH BLD: PRESENT
RBC MORPH BLD: PRESENT
RH BLD: POSITIVE
SAO2 % BLD FROM PO2: 63 % (ref 60–85)
SL CV LV EF: 40
SODIUM BLD-SCNC: 130 MMOL/L (ref 136–145)
SODIUM BLD-SCNC: 133 MMOL/L (ref 136–145)
SODIUM SERPL-SCNC: 133 MMOL/L (ref 135–147)
SODIUM SERPL-SCNC: 134 MMOL/L (ref 135–147)
SODIUM SERPL-SCNC: 135 MMOL/L (ref 135–147)
SODIUM SERPL-SCNC: 136 MMOL/L (ref 135–147)
SODIUM SERPL-SCNC: 140 MMOL/L (ref 135–147)
SODIUM SERPL-SCNC: 142 MMOL/L (ref 135–147)
SP GR UR STRIP.AUTO: 1.03 (ref 1–1.03)
SPECIMEN EXPIRATION DATE: NORMAL
SPECIMEN SOURCE: ABNORMAL
STOMATOCYTES BLD QL SMEAR: PRESENT
T WAVE AXIS: 237 DEGREES
T WAVE AXIS: 260 DEGREES
T WAVE AXIS: 260 DEGREES
TARGETS BLD QL SMEAR: PRESENT
TOXIC GRANULES BLD QL SMEAR: PRESENT
TR MAX PG: 26 MMHG
TR PEAK VELOCITY: 2.5 M/S
TRICUSPID VALVE PEAK REGURGITATION VELOCITY: 2.53 M/S
TSH SERPL DL<=0.05 MIU/L-ACNC: 4.21 UIU/ML (ref 0.45–4.5)
UNIT DISPENSE STATUS: NORMAL
UNIT DISPENSE STATUS: NORMAL
UNIT PRODUCT CODE: NORMAL
UNIT PRODUCT CODE: NORMAL
UNIT PRODUCT VOLUME: 350 ML
UNIT PRODUCT VOLUME: 350 ML
UNIT RH: NORMAL
UNIT RH: NORMAL
UROBILINOGEN UR STRIP-ACNC: >=12 MG/DL
VANCOMYCIN TROUGH SERPL-MCNC: 44.6 UG/ML (ref 10–20)
VENT - PS: ABNORMAL
VENT AC: 18
VENT AC: 18
VENT AC: 24
VENT AC: 24
VENT AC: 28
VENT- AC: AC
VENTRICULAR RATE: 133 BPM
VENTRICULAR RATE: 137 BPM
VENTRICULAR RATE: 91 BPM
VT SETTING VENT: 500 ML
VT SETTING VENT: 500 ML
WBC # BLD AUTO: 10.17 THOUSAND/UL (ref 4.31–10.16)
WBC # BLD AUTO: 15.75 THOUSAND/UL (ref 4.31–10.16)
WBC # BLD AUTO: 5.27 THOUSAND/UL (ref 4.31–10.16)
WBC # BLD AUTO: 5.79 THOUSAND/UL (ref 4.31–10.16)
WBC # BLD AUTO: 8.96 THOUSAND/UL (ref 4.31–10.16)
WBC #/AREA URNS AUTO: ABNORMAL /HPF

## 2022-01-01 PROCEDURE — 84443 ASSAY THYROID STIM HORMONE: CPT | Performed by: INTERNAL MEDICINE

## 2022-01-01 PROCEDURE — 36620 INSERTION CATHETER ARTERY: CPT

## 2022-01-01 PROCEDURE — 82948 REAGENT STRIP/BLOOD GLUCOSE: CPT

## 2022-01-01 PROCEDURE — 84295 ASSAY OF SERUM SODIUM: CPT

## 2022-01-01 PROCEDURE — 85014 HEMATOCRIT: CPT

## 2022-01-01 PROCEDURE — NC001 PR NO CHARGE: Performed by: INTERNAL MEDICINE

## 2022-01-01 PROCEDURE — 80053 COMPREHEN METABOLIC PANEL: CPT | Performed by: NURSE PRACTITIONER

## 2022-01-01 PROCEDURE — 82805 BLOOD GASES W/O2 SATURATION: CPT | Performed by: NURSE PRACTITIONER

## 2022-01-01 PROCEDURE — 84484 ASSAY OF TROPONIN QUANT: CPT | Performed by: NURSE PRACTITIONER

## 2022-01-01 PROCEDURE — 04HY32Z INSERTION OF MONITORING DEVICE INTO LOWER ARTERY, PERCUTANEOUS APPROACH: ICD-10-PCS | Performed by: INTERNAL MEDICINE

## 2022-01-01 PROCEDURE — G1004 CDSM NDSC: HCPCS

## 2022-01-01 PROCEDURE — 85730 THROMBOPLASTIN TIME PARTIAL: CPT | Performed by: INTERNAL MEDICINE

## 2022-01-01 PROCEDURE — 71045 X-RAY EXAM CHEST 1 VIEW: CPT

## 2022-01-01 PROCEDURE — 83735 ASSAY OF MAGNESIUM: CPT | Performed by: NURSE PRACTITIONER

## 2022-01-01 PROCEDURE — 85027 COMPLETE CBC AUTOMATED: CPT

## 2022-01-01 PROCEDURE — 83605 ASSAY OF LACTIC ACID: CPT | Performed by: INTERNAL MEDICINE

## 2022-01-01 PROCEDURE — 94150 VITAL CAPACITY TEST: CPT

## 2022-01-01 PROCEDURE — 93306 TTE W/DOPPLER COMPLETE: CPT

## 2022-01-01 PROCEDURE — 93005 ELECTROCARDIOGRAM TRACING: CPT

## 2022-01-01 PROCEDURE — 85610 PROTHROMBIN TIME: CPT | Performed by: NURSE PRACTITIONER

## 2022-01-01 PROCEDURE — C9113 INJ PANTOPRAZOLE SODIUM, VIA: HCPCS | Performed by: NURSE PRACTITIONER

## 2022-01-01 PROCEDURE — NC001 PR NO CHARGE

## 2022-01-01 PROCEDURE — 83605 ASSAY OF LACTIC ACID: CPT | Performed by: EMERGENCY MEDICINE

## 2022-01-01 PROCEDURE — 85007 BL SMEAR W/DIFF WBC COUNT: CPT

## 2022-01-01 PROCEDURE — 36556 INSERT NON-TUNNEL CV CATH: CPT

## 2022-01-01 PROCEDURE — 87077 CULTURE AEROBIC IDENTIFY: CPT | Performed by: EMERGENCY MEDICINE

## 2022-01-01 PROCEDURE — 84100 ASSAY OF PHOSPHORUS: CPT | Performed by: NURSE PRACTITIONER

## 2022-01-01 PROCEDURE — 4A133B1 MONITORING OF ARTERIAL PRESSURE, PERIPHERAL, PERCUTANEOUS APPROACH: ICD-10-PCS | Performed by: INTERNAL MEDICINE

## 2022-01-01 PROCEDURE — 99292 CRITICAL CARE ADDL 30 MIN: CPT | Performed by: INTERNAL MEDICINE

## 2022-01-01 PROCEDURE — 30233K1 TRANSFUSION OF NONAUTOLOGOUS FROZEN PLASMA INTO PERIPHERAL VEIN, PERCUTANEOUS APPROACH: ICD-10-PCS | Performed by: INTERNAL MEDICINE

## 2022-01-01 PROCEDURE — 81001 URINALYSIS AUTO W/SCOPE: CPT | Performed by: INTERNAL MEDICINE

## 2022-01-01 PROCEDURE — 03HY32Z INSERTION OF MONITORING DEVICE INTO UPPER ARTERY, PERCUTANEOUS APPROACH: ICD-10-PCS | Performed by: INTERNAL MEDICINE

## 2022-01-01 PROCEDURE — 96361 HYDRATE IV INFUSION ADD-ON: CPT

## 2022-01-01 PROCEDURE — 94760 N-INVAS EAR/PLS OXIMETRY 1: CPT

## 2022-01-01 PROCEDURE — 96374 THER/PROPH/DIAG INJ IV PUSH: CPT

## 2022-01-01 PROCEDURE — 94003 VENT MGMT INPAT SUBQ DAY: CPT

## 2022-01-01 PROCEDURE — 85025 COMPLETE CBC W/AUTO DIFF WBC: CPT | Performed by: NURSE PRACTITIONER

## 2022-01-01 PROCEDURE — 82330 ASSAY OF CALCIUM: CPT

## 2022-01-01 PROCEDURE — 36556 INSERT NON-TUNNEL CV CATH: CPT | Performed by: EMERGENCY MEDICINE

## 2022-01-01 PROCEDURE — 02H633Z INSERTION OF INFUSION DEVICE INTO RIGHT ATRIUM, PERCUTANEOUS APPROACH: ICD-10-PCS | Performed by: INTERNAL MEDICINE

## 2022-01-01 PROCEDURE — 85027 COMPLETE CBC AUTOMATED: CPT | Performed by: EMERGENCY MEDICINE

## 2022-01-01 PROCEDURE — 74177 CT ABD & PELVIS W/CONTRAST: CPT

## 2022-01-01 PROCEDURE — 71260 CT THORAX DX C+: CPT

## 2022-01-01 PROCEDURE — 84132 ASSAY OF SERUM POTASSIUM: CPT

## 2022-01-01 PROCEDURE — 5A1945Z RESPIRATORY VENTILATION, 24-96 CONSECUTIVE HOURS: ICD-10-PCS | Performed by: ORTHOPAEDIC SURGERY

## 2022-01-01 PROCEDURE — 84145 PROCALCITONIN (PCT): CPT | Performed by: INTERNAL MEDICINE

## 2022-01-01 PROCEDURE — 99291 CRITICAL CARE FIRST HOUR: CPT | Performed by: NURSE PRACTITIONER

## 2022-01-01 PROCEDURE — 96366 THER/PROPH/DIAG IV INF ADDON: CPT

## 2022-01-01 PROCEDURE — 86920 COMPATIBILITY TEST SPIN: CPT

## 2022-01-01 PROCEDURE — 85730 THROMBOPLASTIN TIME PARTIAL: CPT | Performed by: NURSE PRACTITIONER

## 2022-01-01 PROCEDURE — 87077 CULTURE AEROBIC IDENTIFY: CPT | Performed by: INTERNAL MEDICINE

## 2022-01-01 PROCEDURE — 83605 ASSAY OF LACTIC ACID: CPT | Performed by: NURSE PRACTITIONER

## 2022-01-01 PROCEDURE — C9113 INJ PANTOPRAZOLE SODIUM, VIA: HCPCS | Performed by: INTERNAL MEDICINE

## 2022-01-01 PROCEDURE — 80162 ASSAY OF DIGOXIN TOTAL: CPT

## 2022-01-01 PROCEDURE — 82947 ASSAY GLUCOSE BLOOD QUANT: CPT

## 2022-01-01 PROCEDURE — 85730 THROMBOPLASTIN TIME PARTIAL: CPT | Performed by: EMERGENCY MEDICINE

## 2022-01-01 PROCEDURE — 82805 BLOOD GASES W/O2 SATURATION: CPT | Performed by: INTERNAL MEDICINE

## 2022-01-01 PROCEDURE — 96368 THER/DIAG CONCURRENT INF: CPT

## 2022-01-01 PROCEDURE — 87086 URINE CULTURE/COLONY COUNT: CPT | Performed by: INTERNAL MEDICINE

## 2022-01-01 PROCEDURE — NC001 PR NO CHARGE: Performed by: NURSE PRACTITIONER

## 2022-01-01 PROCEDURE — 82803 BLOOD GASES ANY COMBINATION: CPT

## 2022-01-01 PROCEDURE — 70450 CT HEAD/BRAIN W/O DYE: CPT

## 2022-01-01 PROCEDURE — 85610 PROTHROMBIN TIME: CPT | Performed by: EMERGENCY MEDICINE

## 2022-01-01 PROCEDURE — 87081 CULTURE SCREEN ONLY: CPT | Performed by: NURSE PRACTITIONER

## 2022-01-01 PROCEDURE — 86850 RBC ANTIBODY SCREEN: CPT

## 2022-01-01 PROCEDURE — 80053 COMPREHEN METABOLIC PANEL: CPT | Performed by: EMERGENCY MEDICINE

## 2022-01-01 PROCEDURE — 87040 BLOOD CULTURE FOR BACTERIA: CPT | Performed by: INTERNAL MEDICINE

## 2022-01-01 PROCEDURE — 99292 CRITICAL CARE ADDL 30 MIN: CPT | Performed by: NURSE PRACTITIONER

## 2022-01-01 PROCEDURE — 93010 ELECTROCARDIOGRAM REPORT: CPT | Performed by: INTERNAL MEDICINE

## 2022-01-01 PROCEDURE — 87040 BLOOD CULTURE FOR BACTERIA: CPT | Performed by: EMERGENCY MEDICINE

## 2022-01-01 PROCEDURE — 36600 WITHDRAWAL OF ARTERIAL BLOOD: CPT

## 2022-01-01 PROCEDURE — P9017 PLASMA 1 DONOR FRZ W/IN 8 HR: HCPCS

## 2022-01-01 PROCEDURE — 99291 CRITICAL CARE FIRST HOUR: CPT | Performed by: INTERNAL MEDICINE

## 2022-01-01 PROCEDURE — 83735 ASSAY OF MAGNESIUM: CPT | Performed by: INTERNAL MEDICINE

## 2022-01-01 PROCEDURE — 96375 TX/PRO/DX INJ NEW DRUG ADDON: CPT

## 2022-01-01 PROCEDURE — 80048 BASIC METABOLIC PNL TOTAL CA: CPT | Performed by: INTERNAL MEDICINE

## 2022-01-01 PROCEDURE — 36620 INSERTION CATHETER ARTERY: CPT | Performed by: NURSE PRACTITIONER

## 2022-01-01 PROCEDURE — 87186 SC STD MICRODIL/AGAR DIL: CPT | Performed by: EMERGENCY MEDICINE

## 2022-01-01 PROCEDURE — 90945 DIALYSIS ONE EVALUATION: CPT

## 2022-01-01 PROCEDURE — 87154 CUL TYP ID BLD PTHGN 6+ TRGT: CPT | Performed by: EMERGENCY MEDICINE

## 2022-01-01 PROCEDURE — 86900 BLOOD TYPING SEROLOGIC ABO: CPT

## 2022-01-01 PROCEDURE — 82330 ASSAY OF CALCIUM: CPT | Performed by: INTERNAL MEDICINE

## 2022-01-01 PROCEDURE — 93306 TTE W/DOPPLER COMPLETE: CPT | Performed by: INTERNAL MEDICINE

## 2022-01-01 PROCEDURE — 31500 INSERT EMERGENCY AIRWAY: CPT

## 2022-01-01 PROCEDURE — 85007 BL SMEAR W/DIFF WBC COUNT: CPT | Performed by: EMERGENCY MEDICINE

## 2022-01-01 PROCEDURE — 82330 ASSAY OF CALCIUM: CPT | Performed by: NURSE PRACTITIONER

## 2022-01-01 PROCEDURE — 4A133J1 MONITORING OF ARTERIAL PULSE, PERIPHERAL, PERCUTANEOUS APPROACH: ICD-10-PCS | Performed by: INTERNAL MEDICINE

## 2022-01-01 PROCEDURE — 82550 ASSAY OF CK (CPK): CPT | Performed by: INTERNAL MEDICINE

## 2022-01-01 PROCEDURE — 31500 INSERT EMERGENCY AIRWAY: CPT | Performed by: EMERGENCY MEDICINE

## 2022-01-01 PROCEDURE — 94002 VENT MGMT INPAT INIT DAY: CPT

## 2022-01-01 PROCEDURE — 5A1D70Z PERFORMANCE OF URINARY FILTRATION, INTERMITTENT, LESS THAN 6 HOURS PER DAY: ICD-10-PCS | Performed by: INTERNAL MEDICINE

## 2022-01-01 PROCEDURE — 99255 IP/OBS CONSLTJ NEW/EST HI 80: CPT | Performed by: INTERNAL MEDICINE

## 2022-01-01 PROCEDURE — 36415 COLL VENOUS BLD VENIPUNCTURE: CPT | Performed by: EMERGENCY MEDICINE

## 2022-01-01 PROCEDURE — 99291 CRITICAL CARE FIRST HOUR: CPT

## 2022-01-01 PROCEDURE — 96365 THER/PROPH/DIAG IV INF INIT: CPT

## 2022-01-01 PROCEDURE — 83880 ASSAY OF NATRIURETIC PEPTIDE: CPT | Performed by: NURSE PRACTITIONER

## 2022-01-01 PROCEDURE — 80048 BASIC METABOLIC PNL TOTAL CA: CPT | Performed by: NURSE PRACTITIONER

## 2022-01-01 PROCEDURE — 87186 SC STD MICRODIL/AGAR DIL: CPT | Performed by: INTERNAL MEDICINE

## 2022-01-01 PROCEDURE — 76937 US GUIDE VASCULAR ACCESS: CPT

## 2022-01-01 PROCEDURE — 80202 ASSAY OF VANCOMYCIN: CPT | Performed by: INTERNAL MEDICINE

## 2022-01-01 PROCEDURE — 99291 CRITICAL CARE FIRST HOUR: CPT | Performed by: EMERGENCY MEDICINE

## 2022-01-01 PROCEDURE — 80076 HEPATIC FUNCTION PANEL: CPT | Performed by: NURSE PRACTITIONER

## 2022-01-01 PROCEDURE — 80162 ASSAY OF DIGOXIN TOTAL: CPT | Performed by: INTERNAL MEDICINE

## 2022-01-01 PROCEDURE — 84145 PROCALCITONIN (PCT): CPT | Performed by: EMERGENCY MEDICINE

## 2022-01-01 PROCEDURE — 86901 BLOOD TYPING SEROLOGIC RH(D): CPT

## 2022-01-01 PROCEDURE — 84100 ASSAY OF PHOSPHORUS: CPT | Performed by: INTERNAL MEDICINE

## 2022-01-01 PROCEDURE — 92950 HEART/LUNG RESUSCITATION CPR: CPT

## 2022-01-01 PROCEDURE — 93308 TTE F-UP OR LMTD: CPT

## 2022-01-01 RX ORDER — EPINEPHRINE 0.1 MG/ML
SYRINGE (ML) INJECTION CODE/TRAUMA/SEDATION MEDICATION
Status: COMPLETED | OUTPATIENT
Start: 2022-01-01 | End: 2022-01-01

## 2022-01-01 RX ORDER — VASOPRESSIN 20 U/ML
INJECTION PARENTERAL
Status: DISCONTINUED
Start: 2022-01-01 | End: 2022-01-01 | Stop reason: HOSPADM

## 2022-01-01 RX ORDER — MIDAZOLAM HYDROCHLORIDE 2 MG/2ML
2 INJECTION, SOLUTION INTRAMUSCULAR; INTRAVENOUS ONCE
Status: DISCONTINUED | OUTPATIENT
Start: 2022-01-01 | End: 2022-01-01

## 2022-01-01 RX ORDER — KETAMINE HCL IN NACL, ISO-OSM 100MG/10ML
SYRINGE (ML) INJECTION
Status: DISCONTINUED
Start: 2022-01-01 | End: 2022-01-01 | Stop reason: HOSPADM

## 2022-01-01 RX ORDER — SODIUM CHLORIDE, SODIUM GLUCONATE, SODIUM ACETATE, POTASSIUM CHLORIDE, MAGNESIUM CHLORIDE, SODIUM PHOSPHATE, DIBASIC, AND POTASSIUM PHOSPHATE .53; .5; .37; .037; .03; .012; .00082 G/100ML; G/100ML; G/100ML; G/100ML; G/100ML; G/100ML; G/100ML
1000 INJECTION, SOLUTION INTRAVENOUS ONCE
Status: COMPLETED | OUTPATIENT
Start: 2022-01-01 | End: 2022-01-01

## 2022-01-01 RX ORDER — BUMETANIDE 0.25 MG/ML
1 INJECTION INTRAMUSCULAR; INTRAVENOUS CONTINUOUS
Status: DISCONTINUED | OUTPATIENT
Start: 2022-01-01 | End: 2022-01-01

## 2022-01-01 RX ORDER — NOREPINEPHRINE BITARTRATE 1 MG/ML
INJECTION, SOLUTION INTRAVENOUS
Status: DISCONTINUED
Start: 2022-01-01 | End: 2022-01-01 | Stop reason: HOSPADM

## 2022-01-01 RX ORDER — CALCIUM GLUCONATE 20 MG/ML
2 INJECTION, SOLUTION INTRAVENOUS ONCE
Status: COMPLETED | OUTPATIENT
Start: 2022-01-01 | End: 2022-01-01

## 2022-01-01 RX ORDER — CEFEPIME HYDROCHLORIDE 2 G/50ML
2000 INJECTION, SOLUTION INTRAVENOUS EVERY 12 HOURS
Status: DISCONTINUED | OUTPATIENT
Start: 2022-01-01 | End: 2022-01-01

## 2022-01-01 RX ORDER — LEVOTHYROXINE SODIUM 0.03 MG/1
25 TABLET ORAL
Status: DISCONTINUED | OUTPATIENT
Start: 2022-01-01 | End: 2022-01-01

## 2022-01-01 RX ORDER — DEXTROSE MONOHYDRATE 25 G/50ML
25 INJECTION, SOLUTION INTRAVENOUS ONCE
Status: COMPLETED | OUTPATIENT
Start: 2022-01-01 | End: 2022-01-01

## 2022-01-01 RX ORDER — FENTANYL CITRATE 50 UG/ML
INJECTION, SOLUTION INTRAMUSCULAR; INTRAVENOUS
Status: COMPLETED
Start: 2022-01-01 | End: 2022-01-01

## 2022-01-01 RX ORDER — EPINEPHRINE 1 MG/ML
3 INJECTION, SOLUTION, CONCENTRATE INTRAVENOUS ONCE
Status: DISCONTINUED | OUTPATIENT
Start: 2022-01-01 | End: 2022-01-01

## 2022-01-01 RX ORDER — FENTANYL CITRATE 50 UG/ML
100 INJECTION, SOLUTION INTRAMUSCULAR; INTRAVENOUS ONCE
Status: COMPLETED | OUTPATIENT
Start: 2022-01-01 | End: 2022-01-01

## 2022-01-01 RX ORDER — CHLORHEXIDINE GLUCONATE 0.12 MG/ML
15 RINSE ORAL EVERY 12 HOURS SCHEDULED
Status: DISCONTINUED | OUTPATIENT
Start: 2022-01-01 | End: 2022-01-01

## 2022-01-01 RX ORDER — FENTANYL CITRATE-0.9 % NACL/PF 10 MCG/ML
100 PLASTIC BAG, INJECTION (ML) INTRAVENOUS CONTINUOUS
Status: DISCONTINUED | OUTPATIENT
Start: 2022-01-01 | End: 2022-01-01 | Stop reason: HOSPADM

## 2022-01-01 RX ORDER — DIGOXIN 125 MCG
250 TABLET ORAL DAILY
Status: DISCONTINUED | OUTPATIENT
Start: 2022-01-01 | End: 2022-01-01

## 2022-01-01 RX ORDER — FENTANYL CITRATE-0.9 % NACL/PF 10 MCG/ML
100 PLASTIC BAG, INJECTION (ML) INTRAVENOUS CONTINUOUS
Status: DISCONTINUED | OUTPATIENT
Start: 2022-01-01 | End: 2022-01-01

## 2022-01-01 RX ORDER — AMIODARONE HYDROCHLORIDE 50 MG/ML
INJECTION, SOLUTION INTRAVENOUS
Status: DISPENSED
Start: 2022-01-01 | End: 2022-01-01

## 2022-01-01 RX ORDER — KETAMINE HCL IN NACL, ISO-OSM 100MG/10ML
100 SYRINGE (ML) INJECTION ONCE
Status: DISCONTINUED | OUTPATIENT
Start: 2022-01-01 | End: 2022-01-01

## 2022-01-01 RX ORDER — LORAZEPAM 2 MG/ML
INJECTION INTRAMUSCULAR
Status: COMPLETED
Start: 2022-01-01 | End: 2022-01-01

## 2022-01-01 RX ORDER — EPINEPHRINE 1 MG/ML
INJECTION, SOLUTION, CONCENTRATE INTRAVENOUS
Status: DISCONTINUED
Start: 2022-01-01 | End: 2022-01-01 | Stop reason: HOSPADM

## 2022-01-01 RX ORDER — MIDAZOLAM HYDROCHLORIDE 2 MG/2ML
2 INJECTION, SOLUTION INTRAMUSCULAR; INTRAVENOUS ONCE
Status: COMPLETED | OUTPATIENT
Start: 2022-01-01 | End: 2022-01-01

## 2022-01-01 RX ORDER — MAGNESIUM SULFATE HEPTAHYDRATE 40 MG/ML
2 INJECTION, SOLUTION INTRAVENOUS ONCE
Status: COMPLETED | OUTPATIENT
Start: 2022-01-01 | End: 2022-01-01

## 2022-01-01 RX ORDER — MILRINONE LACTATE 0.2 MG/ML
0.25 INJECTION, SOLUTION INTRAVENOUS CONTINUOUS
Status: DISCONTINUED | OUTPATIENT
Start: 2022-01-01 | End: 2022-01-01

## 2022-01-01 RX ORDER — AMIODARONE HYDROCHLORIDE 50 MG/ML
INJECTION, SOLUTION INTRAVENOUS CODE/TRAUMA/SEDATION MEDICATION
Status: COMPLETED | OUTPATIENT
Start: 2022-01-01 | End: 2022-01-01

## 2022-01-01 RX ORDER — PANTOPRAZOLE SODIUM 40 MG/10ML
40 INJECTION, POWDER, LYOPHILIZED, FOR SOLUTION INTRAVENOUS
Status: DISCONTINUED | OUTPATIENT
Start: 2022-01-01 | End: 2022-01-01

## 2022-01-01 RX ORDER — FENTANYL CITRATE 50 UG/ML
100 INJECTION, SOLUTION INTRAMUSCULAR; INTRAVENOUS
Status: DISCONTINUED | OUTPATIENT
Start: 2022-01-01 | End: 2022-01-01

## 2022-01-01 RX ORDER — KETAMINE HCL IN NACL, ISO-OSM 100MG/10ML
200 SYRINGE (ML) INJECTION ONCE
Status: COMPLETED | OUTPATIENT
Start: 2022-01-01 | End: 2022-01-01

## 2022-01-01 RX ORDER — LORAZEPAM 2 MG/ML
2 INJECTION INTRAMUSCULAR ONCE
Status: DISCONTINUED | OUTPATIENT
Start: 2022-01-01 | End: 2022-01-01

## 2022-01-01 RX ORDER — CALCIUM GLUCONATE 20 MG/ML
1 INJECTION, SOLUTION INTRAVENOUS ONCE
Status: COMPLETED | OUTPATIENT
Start: 2022-01-01 | End: 2022-01-01

## 2022-01-01 RX ORDER — MIDAZOLAM HYDROCHLORIDE 2 MG/2ML
INJECTION, SOLUTION INTRAMUSCULAR; INTRAVENOUS
Status: COMPLETED
Start: 2022-01-01 | End: 2022-01-01

## 2022-01-01 RX ORDER — HEPARIN SODIUM 5000 [USP'U]/ML
7500 INJECTION, SOLUTION INTRAVENOUS; SUBCUTANEOUS EVERY 8 HOURS SCHEDULED
Status: DISCONTINUED | OUTPATIENT
Start: 2022-01-01 | End: 2022-01-01

## 2022-01-01 RX ORDER — FENTANYL CITRATE 50 UG/ML
200 INJECTION, SOLUTION INTRAMUSCULAR; INTRAVENOUS ONCE
Status: COMPLETED | OUTPATIENT
Start: 2022-01-01 | End: 2022-01-01

## 2022-01-01 RX ORDER — LORAZEPAM 2 MG/ML
1 INJECTION INTRAMUSCULAR
Status: DISCONTINUED | OUTPATIENT
Start: 2022-01-01 | End: 2022-01-01

## 2022-01-01 RX ORDER — FENTANYL CITRATE 50 UG/ML
150 INJECTION, SOLUTION INTRAMUSCULAR; INTRAVENOUS ONCE
Status: COMPLETED | OUTPATIENT
Start: 2022-01-01 | End: 2022-01-01

## 2022-01-01 RX ORDER — FENTANYL CITRATE 50 UG/ML
INJECTION, SOLUTION INTRAMUSCULAR; INTRAVENOUS
Status: DISCONTINUED
Start: 2022-01-01 | End: 2022-01-01 | Stop reason: HOSPADM

## 2022-01-01 RX ORDER — PANTOPRAZOLE SODIUM 40 MG/10ML
40 INJECTION, POWDER, LYOPHILIZED, FOR SOLUTION INTRAVENOUS EVERY 12 HOURS SCHEDULED
Status: DISCONTINUED | OUTPATIENT
Start: 2022-01-01 | End: 2022-01-01

## 2022-01-01 RX ORDER — LORAZEPAM 2 MG/ML
1 INJECTION INTRAMUSCULAR ONCE
Status: COMPLETED | OUTPATIENT
Start: 2022-01-01 | End: 2022-01-01

## 2022-01-01 RX ORDER — FENTANYL CITRATE 50 UG/ML
25 INJECTION, SOLUTION INTRAMUSCULAR; INTRAVENOUS
Status: DISCONTINUED | OUTPATIENT
Start: 2022-01-01 | End: 2022-01-01

## 2022-01-01 RX ORDER — BUMETANIDE 0.25 MG/ML
4 INJECTION, SOLUTION INTRAMUSCULAR; INTRAVENOUS ONCE
Status: COMPLETED | OUTPATIENT
Start: 2022-01-01 | End: 2022-01-01

## 2022-01-01 RX ORDER — HEPARIN SODIUM 10000 [USP'U]/100ML
3-20 INJECTION, SOLUTION INTRAVENOUS
Status: DISCONTINUED | OUTPATIENT
Start: 2022-01-01 | End: 2022-01-01

## 2022-01-01 RX ORDER — SODIUM CHLORIDE, SODIUM GLUCONATE, SODIUM ACETATE, POTASSIUM CHLORIDE, MAGNESIUM CHLORIDE, SODIUM PHOSPHATE, DIBASIC, AND POTASSIUM PHOSPHATE .53; .5; .37; .037; .03; .012; .00082 G/100ML; G/100ML; G/100ML; G/100ML; G/100ML; G/100ML; G/100ML
500 INJECTION, SOLUTION INTRAVENOUS ONCE
Status: COMPLETED | OUTPATIENT
Start: 2022-01-01 | End: 2022-01-01

## 2022-01-01 RX ORDER — POTASSIUM CHLORIDE 29.8 MG/ML
40 INJECTION INTRAVENOUS
Status: COMPLETED | OUTPATIENT
Start: 2022-01-01 | End: 2022-01-01

## 2022-01-01 RX ORDER — DEXTROSE MONOHYDRATE 100 MG/ML
50 INJECTION, SOLUTION INTRAVENOUS CONTINUOUS
Status: DISCONTINUED | OUTPATIENT
Start: 2022-01-01 | End: 2022-01-01

## 2022-01-01 RX ORDER — POTASSIUM CHLORIDE 29.8 MG/ML
40 INJECTION INTRAVENOUS ONCE
Status: COMPLETED | OUTPATIENT
Start: 2022-01-01 | End: 2022-01-01

## 2022-01-01 RX ADMIN — HYDROCORTISONE SODIUM SUCCINATE 50 MG: 100 INJECTION, POWDER, FOR SOLUTION INTRAMUSCULAR; INTRAVENOUS at 11:08

## 2022-01-01 RX ADMIN — Medication 200 MG: at 19:50

## 2022-01-01 RX ADMIN — CALCIUM CHLORIDE 1 G: 100 INJECTION, SOLUTION INTRAVENOUS at 23:48

## 2022-01-01 RX ADMIN — VANCOMYCIN HYDROCHLORIDE 1750 MG: 1 INJECTION, POWDER, LYOPHILIZED, FOR SOLUTION INTRAVENOUS at 16:34

## 2022-01-01 RX ADMIN — CHLORHEXIDINE GLUCONATE 15 ML: 1.2 RINSE ORAL at 08:42

## 2022-01-01 RX ADMIN — CEFEPIME HYDROCHLORIDE 2000 MG: 2 INJECTION, SOLUTION INTRAVENOUS at 11:09

## 2022-01-01 RX ADMIN — FENTANYL CITRATE 25 MCG: 50 INJECTION INTRAMUSCULAR; INTRAVENOUS at 23:07

## 2022-01-01 RX ADMIN — SODIUM BICARBONATE 100 MEQ: 84 INJECTION INTRAVENOUS at 00:35

## 2022-01-01 RX ADMIN — HYDROCORTISONE SODIUM SUCCINATE 50 MG: 100 INJECTION, POWDER, FOR SOLUTION INTRAMUSCULAR; INTRAVENOUS at 23:32

## 2022-01-01 RX ADMIN — CALCIUM GLUCONATE 1 G: 20 INJECTION, SOLUTION INTRAVENOUS at 15:35

## 2022-01-01 RX ADMIN — CEFEPIME HYDROCHLORIDE 2000 MG: 2 INJECTION, SOLUTION INTRAVENOUS at 23:32

## 2022-01-01 RX ADMIN — FENTANYL CITRATE 200 MCG: 50 INJECTION, SOLUTION INTRAMUSCULAR; INTRAVENOUS at 17:45

## 2022-01-01 RX ADMIN — PHENYLEPHRINE HYDROCHLORIDE 300 MCG/MIN: 10 INJECTION INTRAVENOUS at 04:36

## 2022-01-01 RX ADMIN — POTASSIUM CHLORIDE 40 MEQ: 400 INJECTION, SOLUTION INTRAVENOUS at 03:42

## 2022-01-01 RX ADMIN — NOREPINEPHRINE BITARTRATE 30 MCG/MIN: 1 SOLUTION INTRAVENOUS at 00:59

## 2022-01-01 RX ADMIN — NOREPINEPHRINE BITARTRATE 30 MCG/MIN: 1 SOLUTION INTRAVENOUS at 15:39

## 2022-01-01 RX ADMIN — AMIODARONE HYDROCHLORIDE 1 MG/MIN: 50 INJECTION, SOLUTION INTRAVENOUS at 00:15

## 2022-01-01 RX ADMIN — SODIUM CHLORIDE, SODIUM GLUCONATE, SODIUM ACETATE, POTASSIUM CHLORIDE, MAGNESIUM CHLORIDE, SODIUM PHOSPHATE, DIBASIC, AND POTASSIUM PHOSPHATE 1000 ML: .53; .5; .37; .037; .03; .012; .00082 INJECTION, SOLUTION INTRAVENOUS at 22:20

## 2022-01-01 RX ADMIN — SODIUM BICARBONATE 150 MEQ: 84 INJECTION INTRAVENOUS at 02:13

## 2022-01-01 RX ADMIN — PHENYLEPHRINE HYDROCHLORIDE 50 MCG/MIN: 10 INJECTION INTRAVENOUS at 11:03

## 2022-01-01 RX ADMIN — Medication 1 MG/HR: at 01:19

## 2022-01-01 RX ADMIN — EPINEPHRINE 15 MCG/MIN: 1 INJECTION, SOLUTION, CONCENTRATE INTRAVENOUS at 18:08

## 2022-01-01 RX ADMIN — Medication 30 MCG/MIN: at 22:20

## 2022-01-01 RX ADMIN — BUMETANIDE 4 MG: 0.25 INJECTION, SOLUTION INTRAMUSCULAR; INTRAVENOUS at 20:43

## 2022-01-01 RX ADMIN — NOREPINEPHRINE BITARTRATE 60 MCG/MIN: 1 SOLUTION INTRAVENOUS at 08:43

## 2022-01-01 RX ADMIN — AMIODARONE HYDROCHLORIDE 150 MG: 50 INJECTION, SOLUTION INTRAVENOUS at 17:50

## 2022-01-01 RX ADMIN — AMIODARONE HYDROCHLORIDE 0.5 MG/MIN: 50 INJECTION, SOLUTION INTRAVENOUS at 21:40

## 2022-01-01 RX ADMIN — AMIODARONE HYDROCHLORIDE 150 MG: 50 INJECTION, SOLUTION INTRAVENOUS at 22:02

## 2022-01-01 RX ADMIN — NOREPINEPHRINE BITARTRATE 60 MCG/MIN: 1 SOLUTION INTRAVENOUS at 04:11

## 2022-01-01 RX ADMIN — VANCOMYCIN HYDROCHLORIDE 2000 MG: 1 INJECTION, POWDER, LYOPHILIZED, FOR SOLUTION INTRAVENOUS at 00:51

## 2022-01-01 RX ADMIN — POTASSIUM CHLORIDE 40 MEQ: 29.8 INJECTION, SOLUTION INTRAVENOUS at 00:12

## 2022-01-01 RX ADMIN — DEXTROSE MONOHYDRATE 25 ML: 25 INJECTION, SOLUTION INTRAVENOUS at 05:42

## 2022-01-01 RX ADMIN — AMIODARONE HYDROCHLORIDE 150 MG: 50 INJECTION, SOLUTION INTRAVENOUS at 22:31

## 2022-01-01 RX ADMIN — SODIUM CHLORIDE, SODIUM GLUCONATE, SODIUM ACETATE, POTASSIUM CHLORIDE, MAGNESIUM CHLORIDE, SODIUM PHOSPHATE, DIBASIC, AND POTASSIUM PHOSPHATE 500 ML: .53; .5; .37; .037; .03; .012; .00082 INJECTION, SOLUTION INTRAVENOUS at 02:40

## 2022-01-01 RX ADMIN — Medication 100 MCG/HR: at 22:31

## 2022-01-01 RX ADMIN — Medication 100 MCG/HR: at 22:57

## 2022-01-01 RX ADMIN — HYDROCORTISONE SODIUM SUCCINATE 50 MG: 100 INJECTION, POWDER, FOR SOLUTION INTRAMUSCULAR; INTRAVENOUS at 18:20

## 2022-01-01 RX ADMIN — EPINEPHRINE 15 MCG/MIN: 1 INJECTION, SOLUTION, CONCENTRATE INTRAVENOUS at 08:58

## 2022-01-01 RX ADMIN — CEFEPIME HYDROCHLORIDE 2000 MG: 2 INJECTION, SOLUTION INTRAVENOUS at 23:55

## 2022-01-01 RX ADMIN — PANTOPRAZOLE SODIUM 40 MG: 40 INJECTION, POWDER, FOR SOLUTION INTRAVENOUS at 08:09

## 2022-01-01 RX ADMIN — SODIUM CHLORIDE, SODIUM GLUCONATE, SODIUM ACETATE, POTASSIUM CHLORIDE, MAGNESIUM CHLORIDE, SODIUM PHOSPHATE, DIBASIC, AND POTASSIUM PHOSPHATE 1000 ML: .53; .5; .37; .037; .03; .012; .00082 INJECTION, SOLUTION INTRAVENOUS at 01:48

## 2022-01-01 RX ADMIN — HYDROCORTISONE SODIUM SUCCINATE 50 MG: 100 INJECTION, POWDER, FOR SOLUTION INTRAMUSCULAR; INTRAVENOUS at 05:03

## 2022-01-01 RX ADMIN — VANCOMYCIN HYDROCHLORIDE 1750 MG: 1 INJECTION, POWDER, LYOPHILIZED, FOR SOLUTION INTRAVENOUS at 08:48

## 2022-01-01 RX ADMIN — HEPARIN SODIUM 11.1 UNITS/KG/HR: 10000 INJECTION, SOLUTION INTRAVENOUS at 21:43

## 2022-01-01 RX ADMIN — HEPARIN SODIUM 11.1 UNITS/KG/HR: 10000 INJECTION, SOLUTION INTRAVENOUS at 23:27

## 2022-01-01 RX ADMIN — SODIUM CHLORIDE 1000 ML: 0.9 INJECTION, SOLUTION INTRAVENOUS at 17:34

## 2022-01-01 RX ADMIN — VANCOMYCIN HYDROCHLORIDE 1750 MG: 1 INJECTION, POWDER, LYOPHILIZED, FOR SOLUTION INTRAVENOUS at 00:41

## 2022-01-01 RX ADMIN — SODIUM BICARBONATE 125 ML/HR: 84 INJECTION, SOLUTION INTRAVENOUS at 02:23

## 2022-01-01 RX ADMIN — IOHEXOL 100 ML: 300 INJECTION, SOLUTION INTRAVENOUS at 15:14

## 2022-01-01 RX ADMIN — FENTANYL CITRATE 150 MCG: 50 INJECTION INTRAMUSCULAR; INTRAVENOUS at 14:20

## 2022-01-01 RX ADMIN — PHENYLEPHRINE HYDROCHLORIDE 300 MCG/MIN: 10 INJECTION INTRAVENOUS at 01:01

## 2022-01-01 RX ADMIN — CHLORHEXIDINE GLUCONATE 15 ML: 1.2 RINSE ORAL at 20:42

## 2022-01-01 RX ADMIN — EPINEPHRINE 13 MCG/MIN: 1 INJECTION, SOLUTION, CONCENTRATE INTRAVENOUS at 19:13

## 2022-01-01 RX ADMIN — SODIUM BICARBONATE 150 ML/HR: 84 INJECTION, SOLUTION INTRAVENOUS at 09:18

## 2022-01-01 RX ADMIN — FENTANYL CITRATE 25 MCG: 50 INJECTION INTRAMUSCULAR; INTRAVENOUS at 04:21

## 2022-01-01 RX ADMIN — EPINEPHRINE 15 MCG/MIN: 1 INJECTION, SOLUTION, CONCENTRATE INTRAVENOUS at 02:12

## 2022-01-01 RX ADMIN — MIDAZOLAM 2 MG: 1 INJECTION INTRAMUSCULAR; INTRAVENOUS at 08:42

## 2022-01-01 RX ADMIN — NOREPINEPHRINE BITARTRATE 60 MCG/MIN: 1 SOLUTION INTRAVENOUS at 01:52

## 2022-01-01 RX ADMIN — EPINEPHRINE 7 MCG/MIN: 1 INJECTION, SOLUTION, CONCENTRATE INTRAVENOUS at 05:27

## 2022-01-01 RX ADMIN — PHENYLEPHRINE HYDROCHLORIDE 300 MCG/MIN: 10 INJECTION INTRAVENOUS at 22:27

## 2022-01-01 RX ADMIN — VASOPRESSIN 0.04 UNITS/MIN: 20 INJECTION INTRAVENOUS at 14:13

## 2022-01-01 RX ADMIN — NOREPINEPHRINE BITARTRATE 60 MCG/MIN: 1 SOLUTION INTRAVENOUS at 06:16

## 2022-01-01 RX ADMIN — PANTOPRAZOLE SODIUM 40 MG: 40 INJECTION, POWDER, FOR SOLUTION INTRAVENOUS at 08:42

## 2022-01-01 RX ADMIN — CALCIUM CHLORIDE 1 G: 100 INJECTION, SOLUTION INTRAVENOUS at 04:48

## 2022-01-01 RX ADMIN — MIDAZOLAM HYDROCHLORIDE 2 MG: 2 INJECTION, SOLUTION INTRAMUSCULAR; INTRAVENOUS at 08:42

## 2022-01-01 RX ADMIN — MAGNESIUM SULFATE HEPTAHYDRATE 2 G: 40 INJECTION, SOLUTION INTRAVENOUS at 00:28

## 2022-01-01 RX ADMIN — VASOPRESSIN 0.04 UNITS/MIN: 20 INJECTION INTRAVENOUS at 07:51

## 2022-01-01 RX ADMIN — EPINEPHRINE 1 MG: 0.1 INJECTION, SOLUTION ENDOTRACHEAL; INTRACARDIAC; INTRAVENOUS at 17:36

## 2022-01-01 RX ADMIN — VASOPRESSIN 0.03 UNITS/MIN: 20 INJECTION INTRAVENOUS at 22:20

## 2022-01-01 RX ADMIN — DEXTROSE MONOHYDRATE 25 ML: 25 INJECTION, SOLUTION INTRAVENOUS at 00:04

## 2022-01-01 RX ADMIN — LORAZEPAM 1 MG: 2 INJECTION INTRAMUSCULAR; INTRAVENOUS at 14:20

## 2022-01-01 RX ADMIN — LEVOTHYROXINE SODIUM 25 MCG: 25 TABLET ORAL at 05:03

## 2022-01-01 RX ADMIN — NOREPINEPHRINE BITARTRATE 10 MCG/MIN: 1 INJECTION INTRAVENOUS at 17:30

## 2022-01-01 RX ADMIN — CALCIUM GLUCONATE 2 G: 20 INJECTION, SOLUTION INTRAVENOUS at 09:22

## 2022-01-01 RX ADMIN — MILRINONE LACTATE IN DEXTROSE 0.25 MCG/KG/MIN: 200 INJECTION, SOLUTION INTRAVENOUS at 09:29

## 2022-01-01 RX ADMIN — CHLORHEXIDINE GLUCONATE 15 ML: 1.2 RINSE ORAL at 08:12

## 2022-01-01 RX ADMIN — NOREPINEPHRINE BITARTRATE 30 MCG/MIN: 1 SOLUTION INTRAVENOUS at 05:28

## 2022-01-01 RX ADMIN — Medication 20000 ML: at 06:45

## 2022-01-01 RX ADMIN — FENTANYL CITRATE 100 MCG: 50 INJECTION, SOLUTION INTRAMUSCULAR; INTRAVENOUS at 19:35

## 2022-01-01 RX ADMIN — EPINEPHRINE 6 MCG/MIN: 1 INJECTION, SOLUTION, CONCENTRATE INTRAVENOUS at 23:07

## 2022-01-01 RX ADMIN — Medication 50 MCG/HR: at 09:38

## 2022-01-01 RX ADMIN — POTASSIUM CHLORIDE 40 MEQ: 400 INJECTION, SOLUTION INTRAVENOUS at 05:36

## 2022-01-01 RX ADMIN — NOREPINEPHRINE BITARTRATE 60 MCG/MIN: 1 SOLUTION INTRAVENOUS at 19:29

## 2022-01-01 RX ADMIN — DIGOXIN 250 MCG: 125 TABLET ORAL at 08:42

## 2022-01-01 RX ADMIN — SODIUM CHLORIDE, POTASSIUM CHLORIDE, SODIUM LACTATE AND CALCIUM CHLORIDE 500 ML: 600; 310; 30; 20 INJECTION, SOLUTION INTRAVENOUS at 02:32

## 2022-01-01 RX ADMIN — VASOPRESSIN 0.04 UNITS/MIN: 20 INJECTION INTRAVENOUS at 22:57

## 2022-01-01 RX ADMIN — LORAZEPAM 2 MG: 2 INJECTION, SOLUTION INTRAMUSCULAR; INTRAVENOUS at 22:18

## 2022-01-01 RX ADMIN — PHENYLEPHRINE HYDROCHLORIDE 260 MCG/MIN: 10 INJECTION INTRAVENOUS at 19:47

## 2022-01-01 RX ADMIN — FENTANYL CITRATE 100 MCG/HR: 50 INJECTION INTRAVENOUS at 18:57

## 2022-01-01 RX ADMIN — AMIODARONE HYDROCHLORIDE 150 MG: 50 INJECTION, SOLUTION INTRAVENOUS at 23:44

## 2022-01-01 RX ADMIN — HYDROCORTISONE SODIUM SUCCINATE 100 MG: 100 INJECTION, POWDER, FOR SOLUTION INTRAMUSCULAR; INTRAVENOUS at 03:30

## 2022-01-01 RX ADMIN — DEXTROSE 50 ML/HR: 10 SOLUTION INTRAVENOUS at 00:26

## 2022-01-01 RX ADMIN — PHENYLEPHRINE HYDROCHLORIDE 300 MCG/MIN: 10 INJECTION INTRAVENOUS at 07:51

## 2022-01-01 RX ADMIN — SODIUM CHLORIDE 4 UNITS/HR: 9 INJECTION, SOLUTION INTRAVENOUS at 02:19

## 2022-01-01 RX ADMIN — PHENYLEPHRINE HYDROCHLORIDE 180 MCG/MIN: 10 INJECTION INTRAVENOUS at 15:55

## 2022-01-01 RX ADMIN — CALCIUM CHLORIDE 1 G: 100 INJECTION INTRAVENOUS; INTRAVENTRICULAR at 22:04

## 2022-01-01 RX ADMIN — EPINEPHRINE 15 MCG/MIN: 1 INJECTION, SOLUTION, CONCENTRATE INTRAVENOUS at 11:00

## 2022-01-01 RX ADMIN — NOREPINEPHRINE BITARTRATE 60 MCG/MIN: 1 SOLUTION INTRAVENOUS at 21:40

## 2022-01-01 RX ADMIN — LEVOTHYROXINE SODIUM 25 MCG: 25 TABLET ORAL at 05:36

## 2022-01-01 RX ADMIN — EPINEPHRINE 9 MCG/MIN: 1 INJECTION, SOLUTION, CONCENTRATE INTRAVENOUS at 23:41

## 2022-01-01 RX ADMIN — Medication 1 MG/HR: at 21:02

## 2022-01-01 RX ADMIN — CHLORHEXIDINE GLUCONATE 15 ML: 1.2 RINSE ORAL at 00:17

## 2022-01-01 RX ADMIN — SODIUM BICARBONATE 150 MEQ: 84 INJECTION INTRAVENOUS at 04:39

## 2022-02-16 ENCOUNTER — NURSING HOME VISIT (OUTPATIENT)
Dept: GERIATRICS | Facility: OTHER | Age: 52
End: 2022-02-16
Payer: COMMERCIAL

## 2022-02-16 DIAGNOSIS — L72.0 EPIDERMOID CYST OF NECK: Primary | ICD-10-CM

## 2022-02-16 PROCEDURE — 99304 1ST NF CARE SF/LOW MDM 25: CPT | Performed by: SURGERY

## 2022-02-17 VITALS
SYSTOLIC BLOOD PRESSURE: 141 MMHG | WEIGHT: 315 LBS | BODY MASS INDEX: 44.1 KG/M2 | HEART RATE: 83 BPM | TEMPERATURE: 98 F | HEIGHT: 71 IN | DIASTOLIC BLOOD PRESSURE: 78 MMHG | OXYGEN SATURATION: 95 %

## 2022-02-17 PROBLEM — L72.0 EPIDERMOID CYST OF NECK: Status: ACTIVE | Noted: 2022-02-17

## 2022-02-17 NOTE — PROGRESS NOTES
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  Patient states that it drains foul-smelling fluid    The sinus probably overlies an underlying epidermoid cyst     The following portions of the patient's history were reviewed and updated as appropriate: allergies, current medications, past family history, past medical history, past social history, past surgical history, and problem list     Review of Systems    Morbid obesity  Patient is on chronic anticoagulation for history of deep vein thrombosis in the remote past    Objective:    /78   Pulse 83   Temp 98 °F (36 7 °C)   Ht 5' 11" (1 803 m)   Wt (!) 243 kg (536 lb)   SpO2 95%   BMI 74 76 kg/m²      Physical Exam    There is a draining sinus on the anterior neck crease

## 2022-02-18 ENCOUNTER — HOSPITAL ENCOUNTER (EMERGENCY)
Facility: HOSPITAL | Age: 52
Discharge: HOME | End: 2022-02-19
Attending: EMERGENCY MEDICINE
Payer: COMMERCIAL

## 2022-02-18 ENCOUNTER — APPOINTMENT (EMERGENCY)
Dept: RADIOLOGY | Facility: HOSPITAL | Age: 52
End: 2022-02-18
Payer: COMMERCIAL

## 2022-02-18 VITALS
RESPIRATION RATE: 18 BRPM | DIASTOLIC BLOOD PRESSURE: 66 MMHG | OXYGEN SATURATION: 96 % | TEMPERATURE: 98.2 F | SYSTOLIC BLOOD PRESSURE: 132 MMHG | HEART RATE: 80 BPM | BODY MASS INDEX: 74.53 KG/M2 | WEIGHT: 315 LBS

## 2022-02-18 DIAGNOSIS — R07.9 CHEST PAIN: Primary | ICD-10-CM

## 2022-02-18 LAB
2HR DELTA HS TROPONIN: 1 NG/L
ALBUMIN SERPL BCP-MCNC: 3.1 G/DL (ref 3.5–5)
ALP SERPL-CCNC: 74 U/L (ref 46–116)
ALT SERPL W P-5'-P-CCNC: 24 U/L (ref 12–78)
ANION GAP SERPL CALCULATED.3IONS-SCNC: -2 MMOL/L (ref 4–13)
AST SERPL W P-5'-P-CCNC: 18 U/L (ref 5–45)
BASOPHILS # BLD AUTO: 0.03 THOUSANDS/ΜL (ref 0–0.1)
BASOPHILS NFR BLD AUTO: 1 % (ref 0–1)
BILIRUB SERPL-MCNC: 0.68 MG/DL (ref 0.2–1)
BUN SERPL-MCNC: 12 MG/DL (ref 5–25)
CALCIUM ALBUM COR SERPL-MCNC: 9.7 MG/DL (ref 8.3–10.1)
CALCIUM SERPL-MCNC: 9 MG/DL (ref 8.3–10.1)
CARDIAC TROPONIN I PNL SERPL HS: 3 NG/L
CARDIAC TROPONIN I PNL SERPL HS: 4 NG/L
CHLORIDE SERPL-SCNC: 96 MMOL/L (ref 100–108)
CO2 SERPL-SCNC: 43 MMOL/L (ref 21–32)
CREAT SERPL-MCNC: 0.91 MG/DL (ref 0.6–1.3)
D DIMER PPP FEU-MCNC: 0.31 UG/ML FEU
EOSINOPHIL # BLD AUTO: 0.15 THOUSAND/ΜL (ref 0–0.61)
EOSINOPHIL NFR BLD AUTO: 2 % (ref 0–6)
ERYTHROCYTE [DISTWIDTH] IN BLOOD BY AUTOMATED COUNT: 14.2 % (ref 11.6–15.1)
FLUAV RNA RESP QL NAA+PROBE: NEGATIVE
FLUBV RNA RESP QL NAA+PROBE: NEGATIVE
GFR SERPL CREATININE-BSD FRML MDRD: 97 ML/MIN/1.73SQ M
GLUCOSE SERPL-MCNC: 125 MG/DL (ref 65–140)
HCT VFR BLD AUTO: 39.5 % (ref 36.5–49.3)
HGB BLD-MCNC: 12.2 G/DL (ref 12–17)
IMM GRANULOCYTES # BLD AUTO: 0.02 THOUSAND/UL (ref 0–0.2)
IMM GRANULOCYTES NFR BLD AUTO: 0 % (ref 0–2)
INR PPP: 1.08 (ref 0.84–1.19)
LYMPHOCYTES # BLD AUTO: 1.08 THOUSANDS/ΜL (ref 0.6–4.47)
LYMPHOCYTES NFR BLD AUTO: 17 % (ref 14–44)
MCH RBC QN AUTO: 29.4 PG (ref 26.8–34.3)
MCHC RBC AUTO-ENTMCNC: 30.9 G/DL (ref 31.4–37.4)
MCV RBC AUTO: 95 FL (ref 82–98)
MONOCYTES # BLD AUTO: 0.51 THOUSAND/ΜL (ref 0.17–1.22)
MONOCYTES NFR BLD AUTO: 8 % (ref 4–12)
NEUTROPHILS # BLD AUTO: 4.5 THOUSANDS/ΜL (ref 1.85–7.62)
NEUTS SEG NFR BLD AUTO: 72 % (ref 43–75)
NRBC BLD AUTO-RTO: 0 /100 WBCS
NT-PROBNP SERPL-MCNC: 607 PG/ML
PLATELET # BLD AUTO: 185 THOUSANDS/UL (ref 149–390)
PMV BLD AUTO: 10.1 FL (ref 8.9–12.7)
POTASSIUM SERPL-SCNC: 3.9 MMOL/L (ref 3.5–5.3)
PROT SERPL-MCNC: 7.3 G/DL (ref 6.4–8.2)
PROTHROMBIN TIME: 14 SECONDS (ref 11.6–14.5)
RBC # BLD AUTO: 4.15 MILLION/UL (ref 3.88–5.62)
RSV RNA RESP QL NAA+PROBE: NEGATIVE
SARS-COV-2 RNA RESP QL NAA+PROBE: NEGATIVE
SODIUM SERPL-SCNC: 137 MMOL/L (ref 136–145)
WBC # BLD AUTO: 6.29 THOUSAND/UL (ref 4.31–10.16)

## 2022-02-18 PROCEDURE — 80053 COMPREHEN METABOLIC PANEL: CPT | Performed by: EMERGENCY MEDICINE

## 2022-02-18 PROCEDURE — 99285 EMERGENCY DEPT VISIT HI MDM: CPT | Performed by: EMERGENCY MEDICINE

## 2022-02-18 PROCEDURE — 99285 EMERGENCY DEPT VISIT HI MDM: CPT

## 2022-02-18 PROCEDURE — 93005 ELECTROCARDIOGRAM TRACING: CPT

## 2022-02-18 PROCEDURE — 83880 ASSAY OF NATRIURETIC PEPTIDE: CPT | Performed by: EMERGENCY MEDICINE

## 2022-02-18 PROCEDURE — 85025 COMPLETE CBC W/AUTO DIFF WBC: CPT | Performed by: EMERGENCY MEDICINE

## 2022-02-18 PROCEDURE — 85610 PROTHROMBIN TIME: CPT | Performed by: EMERGENCY MEDICINE

## 2022-02-18 PROCEDURE — 71045 X-RAY EXAM CHEST 1 VIEW: CPT

## 2022-02-18 PROCEDURE — 84484 ASSAY OF TROPONIN QUANT: CPT | Performed by: EMERGENCY MEDICINE

## 2022-02-18 PROCEDURE — 0241U HB NFCT DS VIR RESP RNA 4 TRGT: CPT | Performed by: EMERGENCY MEDICINE

## 2022-02-18 PROCEDURE — 36415 COLL VENOUS BLD VENIPUNCTURE: CPT | Performed by: EMERGENCY MEDICINE

## 2022-02-18 PROCEDURE — 85379 FIBRIN DEGRADATION QUANT: CPT | Performed by: EMERGENCY MEDICINE

## 2022-02-18 RX ORDER — BUPROPION HYDROCHLORIDE 150 MG/1
450 TABLET ORAL DAILY
Status: DISCONTINUED | OUTPATIENT
Start: 2022-02-18 | End: 2022-02-19 | Stop reason: HOSPADM

## 2022-02-18 RX ORDER — ACETAMINOPHEN 325 MG/1
650 TABLET ORAL ONCE
Status: COMPLETED | OUTPATIENT
Start: 2022-02-18 | End: 2022-02-18

## 2022-02-18 RX ORDER — FUROSEMIDE 40 MG/1
80 TABLET ORAL ONCE
Status: COMPLETED | OUTPATIENT
Start: 2022-02-18 | End: 2022-02-18

## 2022-02-18 RX ORDER — PANTOPRAZOLE SODIUM 20 MG/1
20 TABLET, DELAYED RELEASE ORAL ONCE
Status: COMPLETED | OUTPATIENT
Start: 2022-02-18 | End: 2022-02-18

## 2022-02-18 RX ORDER — METOPROLOL TARTRATE 50 MG/1
50 TABLET, FILM COATED ORAL ONCE
Status: COMPLETED | OUTPATIENT
Start: 2022-02-18 | End: 2022-02-18

## 2022-02-18 RX ORDER — DABIGATRAN ETEXILATE 150 MG/1
150 CAPSULE, COATED PELLETS ORAL ONCE
Status: COMPLETED | OUTPATIENT
Start: 2022-02-18 | End: 2022-02-18

## 2022-02-18 RX ADMIN — FUROSEMIDE 80 MG: 40 TABLET ORAL at 22:05

## 2022-02-18 RX ADMIN — METOPROLOL TARTRATE 50 MG: 50 TABLET, FILM COATED ORAL at 22:05

## 2022-02-18 RX ADMIN — BUPROPION HYDROCHLORIDE 450 MG: 150 TABLET, FILM COATED, EXTENDED RELEASE ORAL at 22:05

## 2022-02-18 RX ADMIN — DABIGATRAN ETEXILATE MESYLATE 150 MG: 150 CAPSULE ORAL at 22:05

## 2022-02-18 RX ADMIN — PANTOPRAZOLE SODIUM 20 MG: 20 TABLET, DELAYED RELEASE ORAL at 22:05

## 2022-02-18 RX ADMIN — ACETAMINOPHEN 650 MG: 325 TABLET, FILM COATED ORAL at 22:05

## 2022-02-18 NOTE — ED PROVIDER NOTES
History  Chief Complaint   Patient presents with    Chest Pain     Patient comes from [facility] [address]  CP starting at 12p  Pressure on right side of chest  Also states he has been having diarrhea  HPI     81-year-old male with multiple comorbidities including atrial fibrillation on Pradaxa, CHF with last LVEF of 60%, history of DVT and PE, morbid obesity, ambulatory dysfunction, on 2 L of oxygen at baseline, known abdominal hernia  Patient presents for evaluation of chest pain that started at 12:00 p m  This afternoon  Described as a pressure sensation, accompanied by shortness of breath worse than his baseline  Reports cough without fevers or chills  Cough is dry and nonproductive  Patient has experienced chest discomfort like this in past that he states was due to being fluid overloaded      Additionally, patient reports abdominal pain located over his large ventral hernia  Pain has been present for many months but worse than usual over the last 2 weeks  Reports nausea without vomiting  Patient has chronic constipation, recently has been taking stool softeners and now having loose stools  He is tolerating oral intake  Denies dysuria or frequency  Wt Readings from Last 3 Encounters:   02/18/22 (!) 242 kg (534 lb 6 3 oz)   02/17/22 (!) 243 kg (536 lb)   11/26/21 (!) 241 kg (531 lb)           Prior to Admission Medications   Prescriptions Last Dose Informant Patient Reported? Taking?    Cholecalciferol (VITAMIN D) 50 MCG (2000 UT) tablet  Outside Facility (Specify) Yes No   Sig: Take 2,000 Units by mouth daily   acetaminophen (TYLENOL) 500 mg tablet  Self Yes No   Sig: Take 1,000 mg by mouth every 6 (six) hours as needed for mild pain   albuterol (2 5 mg/3 mL) 0 083 % nebulizer solution  Outside Facility (Specify) Yes No   Sig: Take 2 5 mg by nebulization every 6 (six) hours as needed for wheezing or shortness of breath   ammonium lactate (LAC-HYDRIN) 12 % cream   No No   Sig: Apply topically 2 (two) times a day   buPROPion (FORFIVO XL) 450 MG 24 hr tablet   No No   Sig: Take 1 tablet (450 mg total) by mouth daily   budesonide-formoterol (SYMBICORT) 160-4 5 mcg/act inhaler   Yes No   Sig: Inhale 2 puffs 2 (two) times a day Rinse mouth after use     cyanocobalamin (VITAMIN B-12) 1000 MCG tablet  Outside Facility (Specify) Yes No   Sig: Take 1,000 mcg by mouth daily   dabigatran etexilate (PRADAXA) 150 mg capsu  Outside Facility (Specify) Yes No   Sig: Take 150 mg by mouth 2 (two) times a day   digoxin (LANOXIN) 0 125 mg tablet  Outside Facility (Specify) No No   Sig: Take 1 tablet by mouth daily for 30 days   furosemide (LASIX) 80 mg tablet   No No   Sig: Take 1 tablet (80 mg total) by mouth 2 (two) times a day   gabapentin (NEURONTIN) 300 mg capsule  Outside Facility (Specify) No No   Sig: Take 1 capsule by mouth 2 (two) times a day as needed (pain) for up to 30 days   ipratropium-albuterol (DUO-NEB) 0 5-2 5 mg/3 mL nebulizer solution  Outside Facility (Specify) Yes No   Sig: Take 3 mL by nebulization 4 (four) times a day   levothyroxine 25 mcg tablet  Outside Facility (Specify) Yes No   Sig: Take 25 mcg by mouth daily   metoprolol tartrate (LOPRESSOR) 50 mg tablet   No No   Sig: Take 1 tablet (50 mg total) by mouth every 12 (twelve) hours   nystatin (MYCOSTATIN) cream   No No   Sig: Apply topically 2 (two) times a day   pantoprazole (PROTONIX) 20 mg tablet  Outside Facility (Specify) Yes No   Sig: Take 20 mg by mouth daily   sodium chloride (OCEAN) 0 65 % nasal spray   No No   Si spray into each nostril as needed for congestion   spironolactone (ALDACTONE) 50 mg tablet  Outside Facility (Specify) Yes No   Sig: Take 50 mg by mouth daily      Facility-Administered Medications: None       Past Medical History:   Diagnosis Date    Afib ([facility] [address] [facility]     Anemia     Anxiety     Cancer ([facility] )     Cardiac disease     Cellulitis     COPD (chronic obstructive pulmonary disease) (HCC)     Depression     Diabetes mellitus ([facility] [place] [address] )     DVT (deep venous thrombosis) (HCC)     GERD (gastroesophageal reflux disease)     HBP (high blood pressure)     Heart failure (HCC)     Hx of blood clots     Hypertension     Hypokalemia     Hypothyroid     Obesity     Psychiatric disorder        Past Surgical History:   Procedure Laterality Date    ABDOMINAL HERNIA REPAIR  05/2019    CHOLECYSTECTOMY      Lap    GASTRECTOMY SLEEVE LAPAROSCOPIC  08/2018    INCISION AND DRAINAGE OF WOUND Bilateral 12/1/2021    Procedure: INCISION AND DRAINAGE (I&D) HEAD/FACE;  Surgeon: Kenn Hannah MD;  Location:  MAIN OR;  Service: General    KNEE SURGERY Right     open wound, injury     LEG SURGERY Right 2009    [facility]  8[facility]6/2018       Family History   Problem Relation Age of Onset    Lung cancer Father     Diabetes Maternal Aunt     Heart attack Mother     Clotting disorder Mother     Hypertension Mother     Heart failure Mother     Diabetes Mother     Atrial fibrillation Mother     Sleep apnea Mother     Obesity Mother     Asthma Sister     Stroke Neg Hx     Anuerysm Neg Hx     Arrhythmia Neg Hx     Hyperlipidemia Neg Hx         pt unsure    Fainting Neg Hx      I have reviewed and agree with the history as documented  E-Cigarette/Vaping    E-Cigarette Use Never User      E-Cigarette/Vaping Substances    Nicotine No     THC No     CBD No     Flavoring No     Other No     Unknown No      Social History     Tobacco Use    Smoking status: Former Smoker     Packs/day: 1 00     Years: 15 00     Pack years: 15 00    Smokeless tobacco: Never Used    Tobacco comment: 1 5ppd x 23 years, quit 2014   Vaping Use    Vaping Use: Never used   Substance Use Topics    Alcohol use: Not Currently    Drug use: No       Review of Systems   Constitutional: Negative for chills and fever  HENT: Negative for congestion  Eyes: Negative for visual disturbance     Respiratory: Positive for shortness of breath  Negative for cough  Cardiovascular: Positive for chest pain  Negative for leg swelling  Gastrointestinal: Positive for diarrhea (taking tool softeners)  Negative for abdominal pain, nausea and vomiting  Genitourinary: Negative for dysuria and frequency  Musculoskeletal: Negative for arthralgias, back pain, neck pain and neck stiffness  Skin: Negative for rash  Neurological: Negative for weakness, numbness and headaches  Psychiatric/Behavioral: Negative for agitation, behavioral problems and confusion  Physical Exam  Physical Exam  Constitutional:       General: He is not in acute distress  Appearance: He is well-developed  He is not ill-appearing or diaphoretic  Comments: BMI 74   HENT:      Head: Normocephalic and atraumatic  Right Ear: External ear normal       Left Ear: External ear normal       Nose: Nose normal    Eyes:      Conjunctiva/sclera: Conjunctivae normal    Cardiovascular:      Rate and Rhythm: Normal rate and regular rhythm  Heart sounds: Normal heart sounds  No murmur heard  No friction rub  No gallop  Pulmonary:      Effort: Pulmonary effort is normal  No respiratory distress  Breath sounds: Normal breath sounds  No wheezing or rales  Abdominal:      General: Bowel sounds are normal       Palpations: Abdomen is soft  Tenderness: There is no abdominal tenderness  There is no guarding  Comments: Protuberant abdomen that is soft and generally nontender  Large ventral hernia palpated that is easily reducible without overlying skin changes  Musculoskeletal:         General: No deformity  Normal range of motion  Cervical back: Normal range of motion and neck supple  Skin:     General: Skin is warm and dry  Neurological:      Mental Status: He is alert and oriented to person, place, and time  Motor: No abnormal muscle tone     Psychiatric:         Mood and Affect: Mood normal          Vital Signs  ED Triage Vitals Temperature Pulse Respirations Blood Pressure SpO2   02/18/22 1604 02/18/22 1435 02/18/22 1435 02/18/22 1435 02/18/22 1435   98 2 °F (36 8 °C) 86 22 136/59 99 %      Temp Source Heart Rate Source Patient Position - Orthostatic VS BP Location FiO2 (%)   02/18/22 1435 02/18/22 1435 02/18/22 1435 02/18/22 1435 --   Oral Monitor Sitting Right arm       Pain Score       02/18/22 1435       6           Vitals:    02/18/22 1445 02/18/22 1630 02/18/22 1800 02/18/22 1900   BP: 136/59 135/58 118/60 100/52   Pulse: 84 80 82 98   Patient Position - Orthostatic VS:  Sitting Lying Sitting         Visual Acuity      ED Medications  Medications   acetaminophen (TYLENOL) tablet 650 mg (has no administration in time range)   buPROPion (WELLBUTRIN XL) 24 hr tablet 450 mg (has no administration in time range)   dabigatran etexilate (PRADAXA) capsule 150 mg (has no administration in time range)   furosemide (LASIX) tablet 80 mg (has no administration in time range)   metoprolol tartrate (LOPRESSOR) tablet 50 mg (has no administration in time range)   pantoprazole (PROTONIX) EC tablet 20 mg (has no administration in time range)       Diagnostic Studies  Results Reviewed     Procedure Component Value Units Date/Time    HS Troponin I 2hr [[id number]]  (Normal) Collected: 02/18/22 1819    Lab Status: Final result Specimen: Blood from Arm, Left Updated: 02/18/22 1926     hs TnI 2hr 4 ng/L      Delta 2hr hsTnI 1 ng/L     COVID/FLU/RSV [[id number]]  (Normal) Collected: 02/18/22 1610    Lab Status: Final result Specimen: Nares from Nose Updated: 02/18/22 1748     SARS-CoV-2 Negative     INFLUENZA A PCR Negative     INFLUENZA B PCR Negative     RSV PCR Negative    Narrative:      FOR PEDIATRIC PATIENTS - copy/paste COVID Guidelines URL to browser: [email or web address]    SARS-CoV-2 assay is a Nucleic Acid Amplification assay intended for the  qualitative detection of nucleic acid from SARS-CoV-2 in nasopharyngeal  swabs  Results are for the presumptive identification of SARS-CoV-2 RNA  Positive results are indicative of infection with SARS-CoV-2, the virus  causing COVID-19, but do not rule out bacterial infection or co-infection  with other viruses  Laboratories within [facility] and [facility]  territories are required to report all positive results to the appropriate  public health authorities  Negative results do not preclude SARS-CoV-2  infection and should not be used as the sole basis for treatment or other  patient management decisions  Negative results must be combined with  clinical observations, patient history, and epidemiological information  This test has not been FDA cleared or approved  This test has been authorized by FDA under an Emergency Use Authorization  (EUA)  This test is only authorized for the duration of time the  declaration that circumstances exist justifying the authorization of the  emergency use of an in vitro diagnostic tests for detection of SARS-CoV-2  virus and/or diagnosis of COVID-19 infection under section 564(b)(1) of  the Act, 21 U  S C  450YWR-5(H)(4), unless the authorization is terminated  or revoked sooner  The test has been validated but independent review by FDA  and CLIA is pending  Test performed using [employer] GeneXpert: This RT-PCR assay targets N2,  a region unique to SARS-CoV-2  A conserved region in the E-gene was chosen  for pan-Sarbecovirus detection which includes SARS-CoV-2      Comprehensive metabolic panel [[id number]]  (Abnormal) Collected: 02/18/22 1610    Lab Status: Final result Specimen: Blood from Arm, Left Updated: 02/18/22 1702     Sodium 137 mmol/L      Potassium 3 9 mmol/L      Chloride 96 mmol/L      CO2 43 mmol/L      ANION GAP -2 mmol/L      BUN 12 mg/dL      Creatinine 0 91 mg/dL      Glucose 125 mg/dL      Calcium 9 0 mg/dL      Corrected Calcium 9 7 mg/dL      AST 18 U/L      ALT 24 U/L      Alkaline Phosphatase 74 U/L      Total Protein 7 3 g/dL      Albumin 3 1 g/dL      Total Bilirubin 0 68 mg/dL      eGFR 97 ml/min/1 73sq m     Narrative:      National Kidney Disease Foundation guidelines for Chronic Kidney Disease (CKD):     Stage 1 with normal or high GFR (GFR > 90 mL/min/1 73 square meters)    Stage 2 Mild CKD (GFR = 60-89 mL/min/1 73 square meters)    Stage 3A Moderate CKD (GFR = 45-59 mL/min/1 73 square meters)    Stage 3B Moderate CKD (GFR = 30-44 mL/min/1 73 square meters)    Stage 4 Severe CKD (GFR = 15-29 mL/min/1 73 square meters)    Stage 5 End Stage CKD (GFR <15 mL/min/1 73 square meters)  Note: GFR calculation is accurate only with a steady state creatinine    NT-BNP PRO [[id number]]  (Abnormal) Collected: 02/18/22 1610    Lab Status: Final result Specimen: Blood from Arm, Left Updated: 02/18/22 1649     NT-proBNP 607 pg/mL     HS Troponin 0hr (reflex protocol) [[id number]]  (Normal) Collected: 02/18/22 1610    Lab Status: Final result Specimen: Blood from Arm, Left Updated: 02/18/22 1647     hs TnI 0hr 3 ng/L     D-dimer, quantitative [[id number]]  (Normal) Collected: 02/18/22 1610    Lab Status: Final result Specimen: Blood from Arm, Left Updated: 02/18/22 1634     D-Dimer, Quant 0 31 ug/ml FEU     Narrative: In the evaluation for possible pulmonary embolism, in the appropriate (Well's Score of 4 or less) patient, the age adjusted d-dimer cutoff for this patient can be calculated as:    Age x 0 01 (in ug/mL) for Age-adjusted D-dimer exclusion threshold for a patient over 50 years      Rolanda Manner [[id number]]  (Normal) Collected: 02/18/22 1610    Lab Status: Final result Specimen: Blood from Arm, Left Updated: 02/18/22 1631     Protime 14 0 seconds      INR 1 08    CBC and differential [[id number]]  (Abnormal) Collected: 02/18/22 1610    Lab Status: Final result Specimen: Blood from Arm, Left Updated: 02/18/22 1624     WBC 6 29 Thousand/uL      RBC 4 15 Million/uL      Hemoglobin 12 2 g/dL Hematocrit 39 5 %      MCV 95 fL      MCH 29 4 pg      MCHC 30 9 g/dL      RDW 14 2 %      MPV 10 1 fL      Platelets 566 Thousands/uL      nRBC 0 /100 WBCs      Neutrophils Relative 72 %      Immat GRANS % 0 %      Lymphocytes Relative 17 %      Monocytes Relative 8 %      Eosinophils Relative 2 %      Basophils Relative 1 %      Neutrophils Absolute 4 50 Thousands/µL      Immature Grans Absolute 0 02 Thousand/uL      Lymphocytes Absolute 1 08 Thousands/µL      Monocytes Absolute 0 51 Thousand/µL      Eosinophils Absolute 0 15 Thousand/µL      Basophils Absolute 0 03 Thousands/µL                  XR chest 1 view portable   Final Result by Shelton Hermosillo MD (02/18 1730)      Compromised by body habitus and suboptimal inspiration with no definite acute pulmonary disease  Workstation performed: [id number]                    Procedures  Procedures         ED Course  ED Course as of 02/18/22 1944 Fri Feb 18, 2022   1546 I personally interpreted the patient's EKG which reveals atrial fibrillation with heart rate of 99 beats per minute, normal axis, normal intervals, less than 1 mm of ST elevation isolated to lead aVL, T-wave inversion in lead V2 similar to prior, nonspecific ST abnormality in lead 2  Repeat EKG with resolution of previously seen slight elevation in lead aVL  1656 BNP elevated to 607, though this is improved from >1,000 in November of this year  No significant weight gain to suggest fluid overload state  1657 Initial troponin 3  HEART score 4  Will obtain delta troponin at 2 hours  1709 Abdomen overall benign to deep palpation  There is a large ventral hernia which is reducible, without overlying skin changes  Doubt incarceration or strangulation  Exam and history not consistent with SBO, appendicitis, acute cholecystitis, diverticulitis, or mesenteric thrombosis or ischemia  1818 Patient resting comfortably  Currently awaiting delta troponin      1818 Chest x-ray with no cardiopulmonary abnormalities  No evidence of pulmonary edema, pneumothorax, or pneumonia  1929 Patient is anticoagulated on Pradaxa, has no increased oxygen requirement, and D-dimer is not elevated  Doubt PE    1930 Delta troponin 1, doubt ACS  1930 Patient already has follow-up scheduled with his cardiologist in 2 days  Strict return precautions discussed  1940 Awaiting transport back to nursing facility  Nighttime meds ordered  HEART Risk Score      Most Recent Value   Heart Score Risk Calculator    History 0 Filed at: 02/18/2022 1657   ECG 1 Filed at: 02/18/2022 1657   Age 1 Filed at: 02/18/2022 1657   Risk Factors 2 Filed at: 02/18/2022 1657   Troponin 0 Filed at: 02/18/2022 1657   HEART Score 4 Filed at: 02/18/2022 1657                                      [facility]  Number of Diagnoses or Management Options  Chest pain: new and requires workup  Diagnosis management comments: Please see ED course above for details of the Medical Decision Making  Amount and/or Complexity of Data Reviewed  Clinical lab tests: ordered and reviewed  Tests in the radiology section of CPT®: ordered and reviewed  Review and summarize past medical records: yes  Independent visualization of images, tracings, or specimens: yes    Patient Progress  Patient progress: stable      Disposition  Final diagnoses:   Chest pain     Time reflects when diagnosis was documented in both MDM as applicable and the Disposition within this note     Time User Action Codes Description Comment    2/18/2022  7:26 PM Toy Laser Add [R07 9] Chest pain       ED Disposition     ED Disposition Condition Date/Time Comment    Discharge Stable Fri Feb 18, 2022  7:26 PM Ellie Hall discharge to home/self care              Follow-up Information     Follow up With Specialties Details Why Contact Info Additional [facility] Schedule an appointment as soon as possible for a visit For further evaluation of your chest pain  [address] [facility] [phone] [address] Cardiology [address] [place], [place], [address]    [facility] Emergency Medicine  As we discussed, return to the Emergency Department immediately for worsening pain, trouble breathing, or increased oxygen requirement   [address] Emergency Department, [address], [place], [place], [address]          Patient's Medications   Discharge Prescriptions    No medications on file           PDMP Review     None          ED Provider  Electronically Signed by           Trice Morgan MD  02/18/22 6416

## 2022-02-19 NOTE — ED NOTES
Spoke with [facility]  They said the pt will most likely not be picked up today due to lack of crews in the area and his weight       Oswaldo Benites RN  02/18/22 2051

## 2022-02-19 NOTE — ED NOTES
Transportation information:    Jadiel Macario at [facility]      Primary RN updated     Eligio Chinchilla, FREDIS  02/18/22 3965

## 2022-02-20 LAB
ATRIAL RATE: 227 BPM
ATRIAL RATE: 340 BPM
QRS AXIS: 35 DEGREES
QRS AXIS: 39 DEGREES
QRSD INTERVAL: 88 MS
QRSD INTERVAL: 92 MS
QT INTERVAL: 310 MS
QT INTERVAL: 328 MS
QTC INTERVAL: 397 MS
QTC INTERVAL: 399 MS
T WAVE AXIS: -58 DEGREES
T WAVE AXIS: -6 DEGREES
VENTRICULAR RATE: 89 BPM
VENTRICULAR RATE: 99 BPM

## 2022-02-20 PROCEDURE — 93010 ELECTROCARDIOGRAM REPORT: CPT | Performed by: INTERNAL MEDICINE

## 2022-02-22 ENCOUNTER — PROCEDURE VISIT (OUTPATIENT)
Dept: SURGERY | Facility: CLINIC | Age: 52
End: 2022-02-22
Payer: COMMERCIAL

## 2022-02-22 VITALS — WEIGHT: 315 LBS | BODY MASS INDEX: 45.1 KG/M2 | HEIGHT: 70 IN | TEMPERATURE: 97.8 F

## 2022-02-22 DIAGNOSIS — L72.0 EPIDERMOID CYST OF NECK: Primary | ICD-10-CM

## 2022-02-22 PROCEDURE — 88304 TISSUE EXAM BY PATHOLOGIST: CPT | Performed by: PATHOLOGY

## 2022-02-22 PROCEDURE — 11422 EXC H-F-NK-SP B9+MARG 1.1-2: CPT | Performed by: SURGERY

## 2022-02-22 NOTE — PATIENT INSTRUCTIONS
Remove Band-Aid after 48 hours  Restart Pradaxa at ordered dose tonight  Will follow-up in the nursing home

## 2022-02-22 NOTE — PROGRESS NOTES
Skin excision    Date/Time: 2/22/2022 11:17 AM  Performed by: Bertha Samano MD  Authorized by: Bertha Samano MD   Universal Protocol:  Consent: Written consent obtained  Consent given by: patient  Time out: Immediately prior to procedure a "time out" was called to verify the correct patient, procedure, equipment, support staff and site/side marked as required  Timeout called at: 2/22/2022 11:00 AM   Patient understanding: patient states understanding of the procedure being performed  Patient consent: the patient's understanding of the procedure matches consent given  Site marked: the operative site was marked    Procedure Details - Skin Excision:     Number of Lesions:  1  Lesion 1:     Body area:  Head/neck    Head/neck location:  Neck       Final defect size (mm):  15    Malignancy: benign lesion      Destruction method: scissors used for extraction      Repair type:  Linear closure    Closure complexity: simple      Repair size (cm):  2     Repair Comments: Excision of sebaceous cyst/sinus anterior neck  Local anesthesia used is 15 mils of 1% lidocaine with 1 in 200,000 epinephrine with sodium bicarbonate    Cyst and sinus excised from anterior neck    Specimen sent to pathology  Incision closed with subcuticular 4-0 Vicryl sutures  Band-Aid dressing applied

## 2022-03-13 ENCOUNTER — APPOINTMENT (EMERGENCY)
Dept: RADIOLOGY | Facility: HOSPITAL | Age: 52
DRG: 243 | End: 2022-03-13
Payer: COMMERCIAL

## 2022-03-13 ENCOUNTER — HOSPITAL ENCOUNTER (INPATIENT)
Facility: HOSPITAL | Age: 52
LOS: 2 days | Discharge: NURSING, REHAB OR LONG-TERM CARE | DRG: 243 | End: 2022-03-18
Attending: EMERGENCY MEDICINE | Admitting: INTERNAL MEDICINE
Payer: COMMERCIAL

## 2022-03-13 DIAGNOSIS — K59.00 CONSTIPATION: ICD-10-CM

## 2022-03-13 DIAGNOSIS — K20.90 ESOPHAGITIS: ICD-10-CM

## 2022-03-13 DIAGNOSIS — R10.33 UMBILICAL PAIN: Primary | ICD-10-CM

## 2022-03-13 DIAGNOSIS — E66.01 MORBID OBESITY (HCC): ICD-10-CM

## 2022-03-13 DIAGNOSIS — K92.0 HEMATEMESIS: ICD-10-CM

## 2022-03-13 DIAGNOSIS — I50.33 ACUTE ON CHRONIC DIASTOLIC CONGESTIVE HEART FAILURE (HCC): ICD-10-CM

## 2022-03-13 DIAGNOSIS — R10.9 INTRACTABLE ABDOMINAL PAIN: ICD-10-CM

## 2022-03-13 DIAGNOSIS — Z98.890 HISTORY OF ABDOMINAL SURGERY: ICD-10-CM

## 2022-03-13 DIAGNOSIS — R11.2 NAUSEA AND VOMITING: ICD-10-CM

## 2022-03-13 LAB
ALBUMIN SERPL BCP-MCNC: 3.2 G/DL (ref 3.5–5)
ALP SERPL-CCNC: 88 U/L (ref 46–116)
ALT SERPL W P-5'-P-CCNC: 26 U/L (ref 12–78)
ANION GAP SERPL CALCULATED.3IONS-SCNC: 0 MMOL/L (ref 4–13)
APTT PPP: 32 SECONDS (ref 23–37)
AST SERPL W P-5'-P-CCNC: 16 U/L (ref 5–45)
BASE EX.OXY STD BLDV CALC-SCNC: 94.7 % (ref 60–80)
BASE EXCESS BLDV CALC-SCNC: 11.6 MMOL/L
BASOPHILS # BLD AUTO: 0.03 THOUSANDS/ΜL (ref 0–0.1)
BASOPHILS NFR BLD AUTO: 0 % (ref 0–1)
BILIRUB SERPL-MCNC: 0.69 MG/DL (ref 0.2–1)
BUN SERPL-MCNC: 16 MG/DL (ref 5–25)
CALCIUM ALBUM COR SERPL-MCNC: 10.1 MG/DL (ref 8.3–10.1)
CALCIUM SERPL-MCNC: 9.5 MG/DL (ref 8.3–10.1)
CARDIAC TROPONIN I PNL SERPL HS: 3 NG/L
CHLORIDE SERPL-SCNC: 98 MMOL/L (ref 100–108)
CO2 SERPL-SCNC: 45 MMOL/L (ref 21–32)
CREAT SERPL-MCNC: 0.99 MG/DL (ref 0.6–1.3)
EOSINOPHIL # BLD AUTO: 0.09 THOUSAND/ΜL (ref 0–0.61)
EOSINOPHIL NFR BLD AUTO: 1 % (ref 0–6)
ERYTHROCYTE [DISTWIDTH] IN BLOOD BY AUTOMATED COUNT: 14.7 % (ref 11.6–15.1)
GFR SERPL CREATININE-BSD FRML MDRD: 87 ML/MIN/1.73SQ M
GLUCOSE SERPL-MCNC: 165 MG/DL (ref 65–140)
HCO3 BLDV-SCNC: 41 MMOL/L (ref 24–30)
HCT VFR BLD AUTO: 39.9 % (ref 36.5–49.3)
HGB BLD-MCNC: 13 G/DL (ref 12–17)
IMM GRANULOCYTES # BLD AUTO: 0.05 THOUSAND/UL (ref 0–0.2)
IMM GRANULOCYTES NFR BLD AUTO: 1 % (ref 0–2)
INR PPP: 1.01 (ref 0.84–1.19)
LACTATE SERPL-SCNC: 0.7 MMOL/L (ref 0.5–2)
LIPASE SERPL-CCNC: 228 U/L (ref 73–393)
LYMPHOCYTES # BLD AUTO: 0.98 THOUSANDS/ΜL (ref 0.6–4.47)
LYMPHOCYTES NFR BLD AUTO: 10 % (ref 14–44)
MCH RBC QN AUTO: 29.9 PG (ref 26.8–34.3)
MCHC RBC AUTO-ENTMCNC: 32.6 G/DL (ref 31.4–37.4)
MCV RBC AUTO: 92 FL (ref 82–98)
MONOCYTES # BLD AUTO: 0.46 THOUSAND/ΜL (ref 0.17–1.22)
MONOCYTES NFR BLD AUTO: 5 % (ref 4–12)
NEUTROPHILS # BLD AUTO: 7.8 THOUSANDS/ΜL (ref 1.85–7.62)
NEUTS SEG NFR BLD AUTO: 83 % (ref 43–75)
NRBC BLD AUTO-RTO: 0 /100 WBCS
O2 CT BLDV-SCNC: 18.4 ML/DL
PCO2 BLDV: 79.6 MM HG (ref 42–50)
PH BLDV: 7.33 [PH] (ref 7.3–7.4)
PLATELET # BLD AUTO: 175 THOUSANDS/UL (ref 149–390)
PMV BLD AUTO: 10.1 FL (ref 8.9–12.7)
PO2 BLDV: 130.5 MM HG (ref 35–45)
POTASSIUM SERPL-SCNC: 4.2 MMOL/L (ref 3.5–5.3)
PROT SERPL-MCNC: 7.7 G/DL (ref 6.4–8.2)
PROTHROMBIN TIME: 13.3 SECONDS (ref 11.6–14.5)
RBC # BLD AUTO: 4.35 MILLION/UL (ref 3.88–5.62)
SODIUM SERPL-SCNC: 143 MMOL/L (ref 136–145)
WBC # BLD AUTO: 9.41 THOUSAND/UL (ref 4.31–10.16)

## 2022-03-13 PROCEDURE — 82805 BLOOD GASES W/O2 SATURATION: CPT | Performed by: PHYSICIAN ASSISTANT

## 2022-03-13 PROCEDURE — 96361 HYDRATE IV INFUSION ADD-ON: CPT

## 2022-03-13 PROCEDURE — 83690 ASSAY OF LIPASE: CPT | Performed by: PHYSICIAN ASSISTANT

## 2022-03-13 PROCEDURE — 96374 THER/PROPH/DIAG INJ IV PUSH: CPT

## 2022-03-13 PROCEDURE — 99285 EMERGENCY DEPT VISIT HI MDM: CPT

## 2022-03-13 PROCEDURE — 36415 COLL VENOUS BLD VENIPUNCTURE: CPT | Performed by: PHYSICIAN ASSISTANT

## 2022-03-13 PROCEDURE — 93005 ELECTROCARDIOGRAM TRACING: CPT

## 2022-03-13 PROCEDURE — 84484 ASSAY OF TROPONIN QUANT: CPT | Performed by: PHYSICIAN ASSISTANT

## 2022-03-13 PROCEDURE — 85610 PROTHROMBIN TIME: CPT | Performed by: PHYSICIAN ASSISTANT

## 2022-03-13 PROCEDURE — 85025 COMPLETE CBC W/AUTO DIFF WBC: CPT | Performed by: PHYSICIAN ASSISTANT

## 2022-03-13 PROCEDURE — 99285 EMERGENCY DEPT VISIT HI MDM: CPT | Performed by: PHYSICIAN ASSISTANT

## 2022-03-13 PROCEDURE — 83880 ASSAY OF NATRIURETIC PEPTIDE: CPT | Performed by: PHYSICIAN ASSISTANT

## 2022-03-13 PROCEDURE — 83605 ASSAY OF LACTIC ACID: CPT | Performed by: PHYSICIAN ASSISTANT

## 2022-03-13 PROCEDURE — 71045 X-RAY EXAM CHEST 1 VIEW: CPT

## 2022-03-13 PROCEDURE — 96375 TX/PRO/DX INJ NEW DRUG ADDON: CPT

## 2022-03-13 PROCEDURE — 80053 COMPREHEN METABOLIC PANEL: CPT | Performed by: PHYSICIAN ASSISTANT

## 2022-03-13 PROCEDURE — 85730 THROMBOPLASTIN TIME PARTIAL: CPT | Performed by: PHYSICIAN ASSISTANT

## 2022-03-13 RX ORDER — ONDANSETRON 2 MG/ML
4 INJECTION INTRAMUSCULAR; INTRAVENOUS ONCE
Status: COMPLETED | OUTPATIENT
Start: 2022-03-13 | End: 2022-03-13

## 2022-03-13 RX ORDER — HYDROXYZINE PAMOATE 25 MG/1
25 CAPSULE ORAL 3 TIMES DAILY PRN
COMMUNITY
End: 2022-06-02 | Stop reason: ALTCHOICE

## 2022-03-13 RX ORDER — BUPROPION HYDROCHLORIDE 150 MG/1
TABLET ORAL
COMMUNITY
Start: 2022-02-10 | End: 2022-03-13

## 2022-03-13 RX ORDER — KETOROLAC TROMETHAMINE 30 MG/ML
15 INJECTION, SOLUTION INTRAMUSCULAR; INTRAVENOUS ONCE
Status: DISCONTINUED | OUTPATIENT
Start: 2022-03-13 | End: 2022-03-13

## 2022-03-13 RX ORDER — LINEZOLID 600 MG/1
TABLET, FILM COATED ORAL
COMMUNITY
Start: 2022-01-28 | End: 2022-03-18 | Stop reason: HOSPADM

## 2022-03-13 RX ORDER — HYDROMORPHONE HCL/PF 1 MG/ML
1 SYRINGE (ML) INJECTION ONCE
Status: COMPLETED | OUTPATIENT
Start: 2022-03-13 | End: 2022-03-13

## 2022-03-13 RX ADMIN — HYDROMORPHONE HYDROCHLORIDE 1 MG: 1 INJECTION, SOLUTION INTRAMUSCULAR; INTRAVENOUS; SUBCUTANEOUS at 22:17

## 2022-03-13 RX ADMIN — SODIUM CHLORIDE 1000 ML: 0.9 INJECTION, SOLUTION INTRAVENOUS at 22:12

## 2022-03-13 RX ADMIN — FAMOTIDINE 20 MG: 10 INJECTION, SOLUTION INTRAVENOUS at 22:15

## 2022-03-13 RX ADMIN — ONDANSETRON 4 MG: 2 INJECTION INTRAMUSCULAR; INTRAVENOUS at 22:14

## 2022-03-14 ENCOUNTER — HOSPITAL ENCOUNTER (OUTPATIENT)
Dept: VASCULAR ULTRASOUND | Facility: HOSPITAL | Age: 52
Setting detail: OBSERVATION
Discharge: HOME | DRG: 243 | End: 2022-03-14
Payer: COMMERCIAL

## 2022-03-14 PROBLEM — R10.9 INTRACTABLE ABDOMINAL PAIN: Status: ACTIVE | Noted: 2022-03-14

## 2022-03-14 PROBLEM — J96.21 ACUTE ON CHRONIC RESPIRATORY FAILURE WITH HYPOXIA (HCC): Status: ACTIVE | Noted: 2021-11-27

## 2022-03-14 LAB
ANION GAP SERPL CALCULATED.3IONS-SCNC: 5 MMOL/L (ref 4–13)
BASOPHILS # BLD AUTO: 0.03 THOUSANDS/ΜL (ref 0–0.1)
BASOPHILS NFR BLD AUTO: 0 % (ref 0–1)
BUN SERPL-MCNC: 16 MG/DL (ref 5–25)
CALCIUM SERPL-MCNC: 9.7 MG/DL (ref 8.3–10.1)
CHLORIDE SERPL-SCNC: 98 MMOL/L (ref 100–108)
CO2 SERPL-SCNC: 40 MMOL/L (ref 21–32)
CREAT SERPL-MCNC: 0.97 MG/DL (ref 0.6–1.3)
EOSINOPHIL # BLD AUTO: 0 THOUSAND/ΜL (ref 0–0.61)
EOSINOPHIL NFR BLD AUTO: 0 % (ref 0–6)
ERYTHROCYTE [DISTWIDTH] IN BLOOD BY AUTOMATED COUNT: 14.6 % (ref 11.6–15.1)
FLUAV RNA RESP QL NAA+PROBE: NEGATIVE
FLUBV RNA RESP QL NAA+PROBE: NEGATIVE
GFR SERPL CREATININE-BSD FRML MDRD: 90 ML/MIN/1.73SQ M
GLUCOSE P FAST SERPL-MCNC: 174 MG/DL (ref 65–99)
GLUCOSE SERPL-MCNC: 156 MG/DL (ref 65–140)
GLUCOSE SERPL-MCNC: 174 MG/DL (ref 65–140)
HCT VFR BLD AUTO: 42 % (ref 36.5–49.3)
HCT VFR BLD AUTO: 45.7 % (ref 36.5–49.3)
HGB BLD-MCNC: 13.5 G/DL (ref 12–17)
HGB BLD-MCNC: 14.2 G/DL (ref 12–17)
IMM GRANULOCYTES # BLD AUTO: 0.11 THOUSAND/UL (ref 0–0.2)
IMM GRANULOCYTES NFR BLD AUTO: 1 % (ref 0–2)
LYMPHOCYTES # BLD AUTO: 0.59 THOUSANDS/ΜL (ref 0.6–4.47)
LYMPHOCYTES NFR BLD AUTO: 5 % (ref 14–44)
MCH RBC QN AUTO: 29.5 PG (ref 26.8–34.3)
MCHC RBC AUTO-ENTMCNC: 31.1 G/DL (ref 31.4–37.4)
MCV RBC AUTO: 95 FL (ref 82–98)
MONOCYTES # BLD AUTO: 0.4 THOUSAND/ΜL (ref 0.17–1.22)
MONOCYTES NFR BLD AUTO: 4 % (ref 4–12)
NEUTROPHILS # BLD AUTO: 10.24 THOUSANDS/ΜL (ref 1.85–7.62)
NEUTS SEG NFR BLD AUTO: 90 % (ref 43–75)
NRBC BLD AUTO-RTO: 0 /100 WBCS
NT-PROBNP SERPL-MCNC: 752 PG/ML
PLATELET # BLD AUTO: 202 THOUSANDS/UL (ref 149–390)
PMV BLD AUTO: 10.1 FL (ref 8.9–12.7)
POTASSIUM SERPL-SCNC: 4.8 MMOL/L (ref 3.5–5.3)
RBC # BLD AUTO: 4.81 MILLION/UL (ref 3.88–5.62)
RSV RNA RESP QL NAA+PROBE: NEGATIVE
SARS-COV-2 RNA RESP QL NAA+PROBE: NEGATIVE
SODIUM SERPL-SCNC: 143 MMOL/L (ref 136–145)
WBC # BLD AUTO: 11.37 THOUSAND/UL (ref 4.31–10.16)

## 2022-03-14 PROCEDURE — C9113 INJ PANTOPRAZOLE SODIUM, VIA: HCPCS | Performed by: INTERNAL MEDICINE

## 2022-03-14 PROCEDURE — 99245 OFF/OP CONSLTJ NEW/EST HI 55: CPT | Performed by: INTERNAL MEDICINE

## 2022-03-14 PROCEDURE — 94760 N-INVAS EAR/PLS OXIMETRY 1: CPT

## 2022-03-14 PROCEDURE — 96375 TX/PRO/DX INJ NEW DRUG ADDON: CPT

## 2022-03-14 PROCEDURE — 82948 REAGENT STRIP/BLOOD GLUCOSE: CPT

## 2022-03-14 PROCEDURE — C9113 INJ PANTOPRAZOLE SODIUM, VIA: HCPCS | Performed by: NURSE PRACTITIONER

## 2022-03-14 PROCEDURE — 87081 CULTURE SCREEN ONLY: CPT | Performed by: INTERNAL MEDICINE

## 2022-03-14 PROCEDURE — 94664 DEMO&/EVAL PT USE INHALER: CPT

## 2022-03-14 PROCEDURE — 85018 HEMOGLOBIN: CPT | Performed by: INTERNAL MEDICINE

## 2022-03-14 PROCEDURE — 0241U HB NFCT DS VIR RESP RNA 4 TRGT: CPT | Performed by: NURSE PRACTITIONER

## 2022-03-14 PROCEDURE — 99253 IP/OBS CNSLTJ NEW/EST LOW 45: CPT | Performed by: SURGERY

## 2022-03-14 PROCEDURE — 85025 COMPLETE CBC W/AUTO DIFF WBC: CPT | Performed by: NURSE PRACTITIONER

## 2022-03-14 PROCEDURE — 93970 EXTREMITY STUDY: CPT | Performed by: SURGERY

## 2022-03-14 PROCEDURE — 99215 OFFICE O/P EST HI 40 MIN: CPT | Performed by: NURSE PRACTITIONER

## 2022-03-14 PROCEDURE — 96376 TX/PRO/DX INJ SAME DRUG ADON: CPT

## 2022-03-14 PROCEDURE — 94660 CPAP INITIATION&MGMT: CPT

## 2022-03-14 PROCEDURE — 80048 BASIC METABOLIC PNL TOTAL CA: CPT | Performed by: NURSE PRACTITIONER

## 2022-03-14 PROCEDURE — 99220 PR INITIAL OBSERVATION CARE/DAY 70 MINUTES: CPT | Performed by: INTERNAL MEDICINE

## 2022-03-14 PROCEDURE — 93970 EXTREMITY STUDY: CPT

## 2022-03-14 PROCEDURE — 85014 HEMATOCRIT: CPT | Performed by: INTERNAL MEDICINE

## 2022-03-14 RX ORDER — MAGNESIUM HYDROXIDE/ALUMINUM HYDROXICE/SIMETHICONE 120; 1200; 1200 MG/30ML; MG/30ML; MG/30ML
30 SUSPENSION ORAL EVERY 6 HOURS PRN
Status: DISCONTINUED | OUTPATIENT
Start: 2022-03-14 | End: 2022-03-18 | Stop reason: HOSPADM

## 2022-03-14 RX ORDER — SPIRONOLACTONE 25 MG/1
50 TABLET ORAL DAILY
Status: DISCONTINUED | OUTPATIENT
Start: 2022-03-14 | End: 2022-03-18 | Stop reason: HOSPADM

## 2022-03-14 RX ORDER — BUDESONIDE AND FORMOTEROL FUMARATE DIHYDRATE 160; 4.5 UG/1; UG/1
2 AEROSOL RESPIRATORY (INHALATION) 2 TIMES DAILY
Status: DISCONTINUED | OUTPATIENT
Start: 2022-03-14 | End: 2022-03-18 | Stop reason: HOSPADM

## 2022-03-14 RX ORDER — NYSTATIN 100000 U/G
CREAM TOPICAL 2 TIMES DAILY
Status: DISCONTINUED | OUTPATIENT
Start: 2022-03-14 | End: 2022-03-16

## 2022-03-14 RX ORDER — HYDROXYZINE HYDROCHLORIDE 25 MG/1
25 TABLET, FILM COATED ORAL 3 TIMES DAILY PRN
Status: DISCONTINUED | OUTPATIENT
Start: 2022-03-14 | End: 2022-03-18 | Stop reason: HOSPADM

## 2022-03-14 RX ORDER — ACETAMINOPHEN 325 MG/1
650 TABLET ORAL EVERY 6 HOURS PRN
Status: DISCONTINUED | OUTPATIENT
Start: 2022-03-14 | End: 2022-03-18 | Stop reason: HOSPADM

## 2022-03-14 RX ORDER — AMMONIUM LACTATE 12 G/100G
CREAM TOPICAL 2 TIMES DAILY
Status: DISCONTINUED | OUTPATIENT
Start: 2022-03-14 | End: 2022-03-18 | Stop reason: HOSPADM

## 2022-03-14 RX ORDER — LEVOTHYROXINE SODIUM 0.03 MG/1
25 TABLET ORAL
Status: DISCONTINUED | OUTPATIENT
Start: 2022-03-14 | End: 2022-03-18 | Stop reason: HOSPADM

## 2022-03-14 RX ORDER — MELATONIN
2000 DAILY
Status: DISCONTINUED | OUTPATIENT
Start: 2022-03-14 | End: 2022-03-18 | Stop reason: HOSPADM

## 2022-03-14 RX ORDER — PANTOPRAZOLE SODIUM 40 MG/1
40 INJECTION, POWDER, FOR SOLUTION INTRAVENOUS EVERY 12 HOURS SCHEDULED
Status: DISCONTINUED | OUTPATIENT
Start: 2022-03-14 | End: 2022-03-14

## 2022-03-14 RX ORDER — BUPROPION HYDROCHLORIDE 150 MG/1
450 TABLET ORAL DAILY
Status: DISCONTINUED | OUTPATIENT
Start: 2022-03-14 | End: 2022-03-18 | Stop reason: HOSPADM

## 2022-03-14 RX ORDER — HYDROMORPHONE HCL/PF 1 MG/ML
0.5 SYRINGE (ML) INJECTION EVERY 4 HOURS PRN
Status: DISCONTINUED | OUTPATIENT
Start: 2022-03-14 | End: 2022-03-18 | Stop reason: HOSPADM

## 2022-03-14 RX ORDER — FUROSEMIDE 10 MG/ML
60 INJECTION INTRAMUSCULAR; INTRAVENOUS 2 TIMES DAILY
Status: DISCONTINUED | OUTPATIENT
Start: 2022-03-14 | End: 2022-03-18

## 2022-03-14 RX ORDER — HYDROMORPHONE HCL/PF 1 MG/ML
0.5 SYRINGE (ML) INJECTION ONCE
Status: COMPLETED | OUTPATIENT
Start: 2022-03-14 | End: 2022-03-14

## 2022-03-14 RX ORDER — METOPROLOL TARTRATE 50 MG/1
50 TABLET, FILM COATED ORAL EVERY 12 HOURS SCHEDULED
Status: DISCONTINUED | OUTPATIENT
Start: 2022-03-14 | End: 2022-03-18 | Stop reason: HOSPADM

## 2022-03-14 RX ORDER — DABIGATRAN ETEXILATE 150 MG/1
150 CAPSULE, COATED PELLETS ORAL 2 TIMES DAILY
Status: DISCONTINUED | OUTPATIENT
Start: 2022-03-14 | End: 2022-03-14

## 2022-03-14 RX ORDER — DICYCLOMINE HCL 20 MG
20 TABLET ORAL
Status: DISCONTINUED | OUTPATIENT
Start: 2022-03-14 | End: 2022-03-18 | Stop reason: HOSPADM

## 2022-03-14 RX ORDER — ONDANSETRON 2 MG/ML
4 INJECTION INTRAMUSCULAR; INTRAVENOUS EVERY 6 HOURS PRN
Status: DISCONTINUED | OUTPATIENT
Start: 2022-03-14 | End: 2022-03-18 | Stop reason: HOSPADM

## 2022-03-14 RX ORDER — DICYCLOMINE HCL 20 MG
20 TABLET ORAL ONCE
Status: COMPLETED | OUTPATIENT
Start: 2022-03-14 | End: 2022-03-14

## 2022-03-14 RX ORDER — OXYCODONE HYDROCHLORIDE 5 MG/1
5 TABLET ORAL EVERY 6 HOURS PRN
Status: DISCONTINUED | OUTPATIENT
Start: 2022-03-14 | End: 2022-03-18 | Stop reason: HOSPADM

## 2022-03-14 RX ORDER — OXYCODONE HYDROCHLORIDE 5 MG/1
2.5 TABLET ORAL EVERY 6 HOURS PRN
Status: DISCONTINUED | OUTPATIENT
Start: 2022-03-14 | End: 2022-03-18 | Stop reason: HOSPADM

## 2022-03-14 RX ORDER — FUROSEMIDE 10 MG/ML
40 INJECTION INTRAMUSCULAR; INTRAVENOUS ONCE
Status: COMPLETED | OUTPATIENT
Start: 2022-03-14 | End: 2022-03-14

## 2022-03-14 RX ORDER — FUROSEMIDE 80 MG
80 TABLET ORAL 2 TIMES DAILY
Status: DISCONTINUED | OUTPATIENT
Start: 2022-03-14 | End: 2022-03-14

## 2022-03-14 RX ORDER — ECHINACEA PURPUREA EXTRACT 125 MG
1 TABLET ORAL EVERY 2 HOUR PRN
Status: DISCONTINUED | OUTPATIENT
Start: 2022-03-14 | End: 2022-03-18 | Stop reason: HOSPADM

## 2022-03-14 RX ADMIN — FUROSEMIDE 40 MG: 10 INJECTION, SOLUTION INTRAMUSCULAR; INTRAVENOUS at 01:18

## 2022-03-14 RX ADMIN — FAMOTIDINE 20 MG: 10 INJECTION INTRAVENOUS at 08:04

## 2022-03-14 RX ADMIN — ONDANSETRON 4 MG: 2 INJECTION INTRAMUSCULAR; INTRAVENOUS at 16:34

## 2022-03-14 RX ADMIN — HYDROMORPHONE HYDROCHLORIDE 0.5 MG: 1 INJECTION, SOLUTION INTRAMUSCULAR; INTRAVENOUS; SUBCUTANEOUS at 10:10

## 2022-03-14 RX ADMIN — PANTOPRAZOLE SODIUM 40 MG: 40 INJECTION, POWDER, FOR SOLUTION INTRAVENOUS at 08:04

## 2022-03-14 RX ADMIN — LEVOTHYROXINE SODIUM 25 MCG: 25 TABLET ORAL at 06:09

## 2022-03-14 RX ADMIN — HYDROMORPHONE HYDROCHLORIDE 0.5 MG: 1 INJECTION, SOLUTION INTRAMUSCULAR; INTRAVENOUS; SUBCUTANEOUS at 22:27

## 2022-03-14 RX ADMIN — DICYCLOMINE HYDROCHLORIDE 20 MG: 20 TABLET ORAL at 21:13

## 2022-03-14 RX ADMIN — OXYCODONE HYDROCHLORIDE 5 MG: 5 TABLET ORAL at 07:58

## 2022-03-14 RX ADMIN — ONDANSETRON 4 MG: 2 INJECTION INTRAMUSCULAR; INTRAVENOUS at 08:04

## 2022-03-14 RX ADMIN — SODIUM CHLORIDE 8 MG/HR: 9 INJECTION, SOLUTION INTRAVENOUS at 15:05

## 2022-03-14 RX ADMIN — METOPROLOL TARTRATE 50 MG: 50 TABLET, FILM COATED ORAL at 21:13

## 2022-03-14 RX ADMIN — DICYCLOMINE HYDROCHLORIDE 20 MG: 20 TABLET ORAL at 01:54

## 2022-03-14 RX ADMIN — HYDROMORPHONE HYDROCHLORIDE 0.5 MG: 1 INJECTION, SOLUTION INTRAMUSCULAR; INTRAVENOUS; SUBCUTANEOUS at 06:09

## 2022-03-14 RX ADMIN — DICYCLOMINE HYDROCHLORIDE 20 MG: 20 TABLET ORAL at 06:09

## 2022-03-14 RX ADMIN — HYDROMORPHONE HYDROCHLORIDE 0.5 MG: 1 INJECTION, SOLUTION INTRAMUSCULAR; INTRAVENOUS; SUBCUTANEOUS at 00:23

## 2022-03-14 RX ADMIN — OXYCODONE HYDROCHLORIDE 5 MG: 5 TABLET ORAL at 16:59

## 2022-03-14 RX ADMIN — NYSTATIN: 100000 CREAM TOPICAL at 17:00

## 2022-03-14 RX ADMIN — HYDROMORPHONE HYDROCHLORIDE 0.5 MG: 1 INJECTION, SOLUTION INTRAMUSCULAR; INTRAVENOUS; SUBCUTANEOUS at 01:54

## 2022-03-14 RX ADMIN — OXYCODONE HYDROCHLORIDE 5 MG: 5 TABLET ORAL at 03:46

## 2022-03-14 RX ADMIN — DICYCLOMINE HYDROCHLORIDE 20 MG: 20 TABLET ORAL at 16:59

## 2022-03-14 RX ADMIN — HYDROMORPHONE HYDROCHLORIDE 0.5 MG: 1 INJECTION, SOLUTION INTRAMUSCULAR; INTRAVENOUS; SUBCUTANEOUS at 18:04

## 2022-03-14 RX ADMIN — Medication: at 17:00

## 2022-03-14 RX ADMIN — FUROSEMIDE 60 MG: 10 INJECTION, SOLUTION INTRAMUSCULAR; INTRAVENOUS at 18:05

## 2022-03-14 RX ADMIN — FUROSEMIDE 60 MG: 10 INJECTION, SOLUTION INTRAMUSCULAR; INTRAVENOUS at 12:53

## 2022-03-14 RX ADMIN — BUDESONIDE AND FORMOTEROL FUMARATE DIHYDRATE 2 PUFF: 160; 4.5 AEROSOL RESPIRATORY (INHALATION) at 17:00

## 2022-03-14 NOTE — ASSESSMENT & PLAN NOTE
· Patient wears 2 L of supplemental oxygen chronically  Currently patient requiring 4 L with SpO2 92%  · Suspect secondary to CHF exacerbation  · COVID/Influenza/RSV pending  · Respiratory protocol

## 2022-03-14 NOTE — H&P
[address] 1970, 47 y o  male MRN: [id number]  Unit/Bed#: W -02 Encounter: [id number]  Primary Care Provider: Adrienne Longo MD   Date and time admitted to hospital: 3/13/2022  9:24 PM    * Intractable abdominal pain  Assessment & Plan  · Presentation: Patient presents with complaints of dull, aching generalized abdominal pain for 2 days  He denies radiation of the pain  He reports associated symptoms of nausea and vomiting  He denies fever, chills, diarrhea, constipation or urinary symptoms  · Unable to obtain CT imaging/Obstruction series due to large body habitus  · Surgery evaluated patient in ED, it was determined no surgical intervention was needed at this time  · Pain control  · Antiemetics  · GI cocktail  · Clear liquid diet, advance as tolerated  · Consider GI consult if patient clinically worsens  Acute on chronic diastolic congestive heart failure [facility])  Assessment & Plan  Wt Readings from Last 3 Encounters:   03/13/22 (!) 248 kg (546 lb 4 8 oz)   02/22/22 (!) 242 kg (534 lb)   02/18/22 (!) 242 kg (534 lb 6 3 oz)    Chest x-ray: Large body habitus, official read pending  Last echocardiogram on file from July 1529: LV systolic function was normal  Ejection fraction was estimated to be 60%  Initial troponin: 3  Will trend x3 or to peak  BNP: 752  Diuresis: Received 40 mg of IV Lasix in ED  Continue with IV Lasix 60 mg bid  Beta-blocker: Metoprolol tartrate  Monitor intake and output, daily weights  Diet: Currently attempting clear liquid diet due to N/V/abdominal pain, will require cardiac and fluid restriction when tolerating oral intake  Consult Cardiology  Acute on chronic respiratory failure with hypoxia (HCC)  Assessment & Plan  · Patient wears 2 L of supplemental oxygen chronically  Currently patient requiring 4 L with SpO2 92%  · Suspect secondary to CHF exacerbation  · COVID/Influenza/RSV pending    · Respiratory protocol  COPD (chronic obstructive pulmonary disease) ([facility])  Assessment & Plan  · Not in an acute exacerbation  · Continue home regimen of Symbicort  · Respiratory protocol  Morbid obesity ([address] )  Assessment & Plan  · Encouraged lifestyle modifications  History of DVT (deep vein thrombosis)  Assessment & Plan  · Continue Pradaxa  Hypothyroidism  Assessment & Plan  · Continue levothyroxine  Atrial fibrillation (HCC)  Assessment & Plan  · Rate/Rhythm Control: Metoprolol tartrate  Antiplatelet/Anticoagulation: Pradaxa  Obesity hypoventilation syndrome/FILIPPO  Assessment & Plan  · CPAP HS  VTE Pharmacologic Prophylaxis: VTE Score: 4 Moderate Risk (Score 3-4) - Pharmacological DVT Prophylaxis Ordered: dabigatran (Pradaxa)  Code Status: Level 1 - Full Code     Anticipated Length of Stay: Patient will be admitted on an observation basis with an anticipated length of stay of less than 2 midnights secondary to pain control  Total Time for Visit, including Counseling / Coordination of Care: 70 minutes Greater than 50% of this total time spent on direct patient counseling and coordination of care  Chief Complaint: Abdominal pain, nausea and vomiting    History of Present Illness:  Real Julien is a 47 y o  male with a PMH of morbid obesity, CHF, COPD, chronic respiratory failure, atrial fibrillation on Pradaxa, FILIPPO, hypothyroidism and history of DVT who presents with abdominal pain, nausea and vomiting  Patient presents with complaints of dull, aching generalized abdominal pain for 2 days  He denies radiation of the pain  He reports associated symptoms of nausea and vomiting  He denies fever, chills, diarrhea, constipation or urinary symptoms  During evaluation in the ED, patient required multiple rounds of pain medication  Additionally, patient was requiring an increase in supplemental oxygen requirements compared to baseline  Patient given IV lasix   Patient will be admitted for pain control, PO challenge and IV diuretics  Review of Systems:  Review of Systems   Constitutional: Negative for diaphoresis and fever  Respiratory: Negative for shortness of breath  Cardiovascular: Negative for chest pain  Gastrointestinal: Positive for abdominal pain, nausea and vomiting  Negative for constipation and diarrhea  Genitourinary: Negative for dysuria and urgency  All other systems reviewed and are negative  Past Medical and Surgical History:   Past Medical History:   Diagnosis Date    Afib ([place] [address] )     Anemia     Anxiety     Cancer ([place] [address] )     Cardiac disease     Cellulitis     COPD (chronic obstructive pulmonary disease) ([place] [address] )     Depression     Diabetes mellitus ([place] [address] )     DVT (deep venous thrombosis) ([facility])     GERD (gastroesophageal reflux disease)     HBP (high blood pressure)     Heart failure ([place] [address] )     Hx of blood clots     Hypertension     Hypokalemia     Hypothyroid     Obesity     Psychiatric disorder        Past Surgical History:   Procedure Laterality Date    ABDOMINAL HERNIA REPAIR  05/2019    CHOLECYSTECTOMY      Lap    GASTRECTOMY SLEEVE LAPAROSCOPIC  08/2018    INCISION AND DRAINAGE OF WOUND Bilateral 12/1/2021    Procedure: INCISION AND DRAINAGE (I&D) HEAD/FACE;  Surgeon: Gus Saeed MD;  Location:  MAIN OR;  Service: General    KNEE SURGERY Right     open wound, injury     LEG SURGERY Right 2009    US GUIDED VASCULAR ACCESS  8/6/2018       Meds/Allergies:  Prior to Admission medications    Medication Sig Start Date End Date Taking?  Authorizing Provider   acetaminophen (TYLENOL) 500 mg tablet Take 1,000 mg by mouth every 6 (six) hours as needed for mild pain    Souleymane Peña MD   albuterol (2 5 mg/3 mL) 0 083 % nebulizer solution Take 2 5 mg by nebulization every 6 (six) hours as needed for wheezing or shortness of breath    Historical Provider, MD   ammonium lactate (LAC-HYDRIN) 12 % cream Apply topically 2 (two) times a day 11/15/21   Renee Barlow Cardio, MD   budesonide-formoterol (SYMBICORT) 160-4 5 mcg/act inhaler Inhale 2 puffs 2 (two) times a day Rinse mouth after use  Historical Provider, MD   buPROPion (FORFIVO XL) 450 MG 24 hr tablet Take 1 tablet (450 mg total) by mouth daily 11/16/21   Estill Krabbe, MD   Cholecalciferol (VITAMIN D) 50 MCG (2000 UT) tablet Take 2,000 Units by mouth daily    Historical Provider, MD   cyanocobalamin (VITAMIN B-12) 1000 MCG tablet Take 1,000 mcg by mouth daily    Historical Provider, MD   dabigatran etexilate (PRADAXA) 150 mg capsu Take 150 mg by mouth 2 (two) times a day    Historical Provider, MD   furosemide (LASIX) 80 mg tablet Take 1 tablet (80 mg total) by mouth 2 (two) times a day 11/15/21   Estill Krabbe, MD   hydrOXYzine pamoate (VISTARIL) 25 mg capsule Take 25 mg by mouth Three times daily as needed    Historical Provider, MD   ipratropium-albuterol (DUO-NEB) 0 5-2 5 mg/3 mL nebulizer solution Take 3 mL by nebulization 4 (four) times a day    Historical Provider, MD   levothyroxine 25 mcg tablet Take 25 mcg by mouth daily    Historical Provider, MD   linezolid (ZYVOX) 600 mg tablet  1/28/22   Historical Provider, MD   metoprolol tartrate (LOPRESSOR) 50 mg tablet Take 1 tablet (50 mg total) by mouth every 12 (twelve) hours 11/15/21   Estill Krabbe, MD   nystatin (MYCOSTATIN) cream Apply topically 2 (two) times a day 11/15/21   Estill Krabbe, MD   pantoprazole (PROTONIX) 20 mg tablet Take 20 mg by mouth daily    Historical Provider, MD   sodium chloride (OCEAN) 0 65 % nasal spray 1 spray into each nostril as needed for congestion 11/15/21   Estill Krabbe, MD   spironolactone (ALDACTONE) 50 mg tablet Take 50 mg by mouth daily    Historical Provider, MD     I have reveiwed home medications using records provided by   Allergies:    Allergies   Allergen Reactions    Penicillins Hives       Social History:  Marital Status: Single   Occupation: Unknown  Patient Pre-hospital Living Situation: Skilled Nursing Facility: [place] [address]  Patient Pre-hospital Level of Mobility: unable to be assessed at time of evaluation  Patient Pre-hospital Diet Restrictions: Cardiac, FR   Substance Use History:   Social History     Substance and Sexual Activity   Alcohol Use Not Currently     Social History     Tobacco Use   Smoking Status Former Smoker    Packs/day: 1 00    Years: 15 00    Pack years: 15 00   Smokeless Tobacco Never Used   Tobacco Comment    1 5ppd x 23 years, quit 2014     Social History     Substance and Sexual Activity   Drug Use No       Family History:  Family History   Problem Relation Age of Onset    Lung cancer Father     Diabetes Maternal Aunt     Heart attack Mother     Clotting disorder Mother     Hypertension Mother     Heart failure Mother     Diabetes Mother     Atrial fibrillation Mother     Sleep apnea Mother     Obesity Mother     Asthma Sister     Stroke Neg Hx     Anuerysm Neg Hx     Arrhythmia Neg Hx     Hyperlipidemia Neg Hx         pt unsure    Fainting Neg Hx        Physical Exam:     Vitals:   Blood Pressure: 122/71 (03/14/22 0314)  Pulse: 91 (03/14/22 0314)  Temperature: (!) 97 2 °F (36 2 °C) (03/14/22 0314)  Temp Source: Oral (03/13/22 2131)  Respirations: 18 (03/14/22 0314)  Height: 5' 10" (177 8 cm) (03/13/22 2130)  Weight - Scale: (!) 248 kg (546 lb 4 8 oz) (03/13/22 2130)  SpO2: (!) 88 % (03/14/22 0314)    Physical Exam  Vitals and nursing note reviewed  Constitutional:       General: He is not in acute distress  Appearance: He is obese  He is not ill-appearing, toxic-appearing or diaphoretic  HENT:      Head: Normocephalic  Nose: Nose normal       Mouth/Throat:      Pharynx: Oropharynx is clear  Eyes:      General: No scleral icterus  Conjunctiva/sclera: Conjunctivae normal    Cardiovascular:      Rate and Rhythm: Normal rate  Rhythm irregular  Pulses:           Posterior tibial pulses are 1+ on the right side and 1+ on the left side  Pulmonary:      Effort: Accessory muscle usage present  No respiratory distress  Breath sounds: Examination of the right-middle field reveals rales  Examination of the left-middle field reveals rales  Rales present  No wheezing or rhonchi  Chest:      Chest wall: No tenderness  Abdominal:      General: Bowel sounds are normal  There is no distension  Palpations: Abdomen is soft  Tenderness: There is generalized abdominal tenderness  Musculoskeletal:         General: No swelling or deformity  Normal range of motion  Cervical back: Normal range of motion  Skin:     General: Skin is warm and dry  Neurological:      Mental Status: He is alert and oriented to person, place, and time  Psychiatric:         Speech: Speech normal           Additional Data:     Lab Results:222  Results from last 7 days   Lab Units 03/13/22  2211   WBC Thousand/uL 9 41   HEMOGLOBIN g/dL 13 0   HEMATOCRIT % 39 9   PLATELETS Thousands/uL 175   NEUTROS PCT % 83*   LYMPHS PCT % 10*   MONOS PCT % 5   EOS PCT % 1     Results from last 7 days   Lab Units 03/13/22  2211   SODIUM mmol/L 143   POTASSIUM mmol/L 4 2   CHLORIDE mmol/L 98*   CO2 mmol/L 45*   BUN mg/dL 16   CREATININE mg/dL 0 99   ANION GAP mmol/L 0*   CALCIUM mg/dL 9 5   ALBUMIN g/dL 3 2*   TOTAL BILIRUBIN mg/dL 0 69   ALK PHOS U/L 88   ALT U/L 26   AST U/L 16   GLUCOSE RANDOM mg/dL 165*     Results from last 7 days   Lab Units 03/13/22  2211   INR  1 01             Results from last 7 days   Lab Units 03/13/22  2211   LACTIC ACID mmol/L 0 7       Imaging: Personally reviewed the following imaging: chest xray  XR chest 1 view portable   ED Interpretation by Stacia Awad PA-C (03/13 2245)   Mild cardiomegaly on initial read          EKG and Other Studies Reviewed on Admission:   · EKG: Atrial fibrillation  HR 88     ** Please Note: This note has been constructed using a voice recognition system   **

## 2022-03-14 NOTE — ASSESSMENT & PLAN NOTE
Wt Readings from Last 3 Encounters:   03/13/22 (!) 248 kg (546 lb 4 8 oz)   02/22/22 (!) 242 kg (534 lb)   02/18/22 (!) 242 kg (534 lb 6 3 oz)    Chest x-ray: Large body habitus, official read pending  Last echocardiogram on file from July 5305: LV systolic function was normal  Ejection fraction was estimated to be 60%  Initial troponin: 3  Will trend x3 or to peak  BNP: 752  Diuresis: Received 40 mg of IV Lasix in ED  Continue with IV Lasix 60 mg bid  Beta-blocker: Metoprolol tartrate  Monitor intake and output, daily weights  Diet: Currently attempting clear liquid diet due to N/V/abdominal pain, will require cardiac and fluid restriction when tolerating oral intake  Consult Cardiology

## 2022-03-14 NOTE — ED PROVIDER NOTES
History  Chief Complaint   Patient presents with    Abdominal Pain     Pt complains of center abdominal pain for the last couple of days  Patient is a 64-year-old male with history of COPD, GERD, hypertension, hypokalemia, hypothyroidism, BMI greater than 40, cholecystectomy, gastrectomy and sleeve laparoscopic, abdominal hernia repair the presents emergency department with persistent dull aching nonradiating generalized abdominal pain symptoms for 2 days  Patient has associated symptomatology of nausea vomiting symptoms beginning on presentation of generalized abdominal pain  Patient states that he is currently on 2 L nasal cannulated oxygen secondary to history of CHF currently on Lasix 80 mg daily to which patient states that he is compliant  Patient also reports that he is from a rehab facility to which she is currently residing  Patient denies palliative factors with provocative factors of pressure to central abdomen and smell of food  Patient denies not effective treatment  Patient denies fevers, chills, diarrhea, constipation and urinary symptoms  Patient denies recent fall or recent trauma  Patient sick contacts recent travel  Patient denies chest pain and shortness of breath  History provided by:  Patient   used: No        Prior to Admission Medications   Prescriptions Last Dose Informant Patient Reported? Taking?    Cholecalciferol (VITAMIN D) 50 MCG (2000 UT) tablet  Outside Facility (Specify) Yes No   Sig: Take 2,000 Units by mouth daily   acetaminophen (TYLENOL) 500 mg tablet  Self Yes No   Sig: Take 1,000 mg by mouth every 6 (six) hours as needed for mild pain   albuterol (2 5 mg/3 mL) 0 083 % nebulizer solution  Outside Facility (Specify) Yes No   Sig: Take 2 5 mg by nebulization every 6 (six) hours as needed for wheezing or shortness of breath   ammonium lactate (LAC-HYDRIN) 12 % cream   No No   Sig: Apply topically 2 (two) times a day   buPROPion (FORFIVO XL) 450 MG 24 hr tablet   No No   Sig: Take 1 tablet (450 mg total) by mouth daily   budesonide-formoterol (SYMBICORT) 160-4 5 mcg/act inhaler   Yes No   Sig: Inhale 2 puffs 2 (two) times a day Rinse mouth after use     cyanocobalamin (VITAMIN B-12) 1000 MCG tablet  Outside Facility (Specify) Yes No   Sig: Take 1,000 mcg by mouth daily   dabigatran etexilate (PRADAXA) 150 mg capsu  Outside Facility (Specify) Yes No   Sig: Take 150 mg by mouth 2 (two) times a day   furosemide (LASIX) 80 mg tablet   No No   Sig: Take 1 tablet (80 mg total) by mouth 2 (two) times a day   hydrOXYzine pamoate (VISTARIL) 25 mg capsule   Yes No   Sig: Take 25 mg by mouth Three times daily as needed   ipratropium-albuterol (DUO-NEB) 0 5-2 5 mg/3 mL nebulizer solution  Outside Facility (Specify) Yes No   Sig: Take 3 mL by nebulization 4 (four) times a day   levothyroxine 25 mcg tablet  Outside Facility (Specify) Yes No   Sig: Take 25 mcg by mouth daily   linezolid (ZYVOX) 600 mg tablet   Yes No   metoprolol tartrate (LOPRESSOR) 50 mg tablet   No No   Sig: Take 1 tablet (50 mg total) by mouth every 12 (twelve) hours   nystatin (MYCOSTATIN) cream   No No   Sig: Apply topically 2 (two) times a day   pantoprazole (PROTONIX) 20 mg tablet  Outside Facility (Specify) Yes No   Sig: Take 20 mg by mouth daily   sodium chloride (OCEAN) 0 65 % nasal spray   No No   Si spray into each nostril as needed for congestion   spironolactone (ALDACTONE) 50 mg tablet  Outside Facility (Specify) Yes No   Sig: Take 50 mg by mouth daily      Facility-Administered Medications: None       Past Medical History:   Diagnosis Date    Afib ([place] [address] )     Anemia     Anxiety     Cancer ([place] [address] )     Cardiac disease     Cellulitis     [facility]     Depression     Diabetes mellitus ([place] [address] )     DVT (deep venous thrombosis) ([facility])     GERD (gastroesophageal reflux disease)     HBP (high blood pressure)     Heart failure ([facility])     Hx of blood clots     Hypertension     Hypokalemia     Hypothyroid     Obesity     Psychiatric disorder        Past Surgical History:   Procedure Laterality Date    ABDOMINAL HERNIA REPAIR  05/2019    CHOLECYSTECTOMY      Lap    GASTRECTOMY SLEEVE LAPAROSCOPIC  08/2018    INCISION AND DRAINAGE OF WOUND Bilateral 12/1/2021    Procedure: INCISION AND DRAINAGE (I&D) HEAD/FACE;  Surgeon: Antwan Linn MD;  Location:  MAIN OR;  Service: General    KNEE SURGERY Right     open wound, injury     LEG SURGERY Right 2009   [place] [id number] VASCULAR ACCESS  8/6/2018       Family History   Problem Relation Age of Onset    Lung cancer Father     Diabetes Maternal Aunt     Heart attack Mother     Clotting disorder Mother     Hypertension Mother     Heart failure Mother     Diabetes Mother     Atrial fibrillation Mother     Sleep apnea Mother     Obesity Mother     Asthma Sister     Stroke Neg Hx     Anuerysm Neg Hx     Arrhythmia Neg Hx     Hyperlipidemia Neg Hx         pt unsure    Fainting Neg Hx      I have reviewed and agree with the history as documented  E-Cigarette/Vaping    E-Cigarette Use Never User      E-Cigarette/Vaping Substances    Nicotine No     THC No     CBD No     Flavoring No     Other No     Unknown No      Social History     Tobacco Use    Smoking status: Former Smoker     Packs/day: 1 00     Years: 15 00     Pack years: 15 00    Smokeless tobacco: Never Used    Tobacco comment: 1 5ppd x 23 years, quit 2014   Vaping Use    Vaping Use: Never used   Substance Use Topics    Alcohol use: Not Currently    Drug use: No       Review of Systems   Constitutional: Negative for activity change, appetite change, chills and fever  HENT: Negative for congestion, postnasal drip, rhinorrhea, sinus pressure, sinus pain, sore throat and tinnitus  Eyes: Negative for photophobia and visual disturbance  Respiratory: Negative for cough, chest tightness and shortness of breath  Cardiovascular: Negative for chest pain and palpitations  Gastrointestinal: Positive for abdominal pain and nausea  Negative for constipation, diarrhea and vomiting  Genitourinary: Negative for difficulty urinating, dysuria, flank pain, frequency and urgency  Musculoskeletal: Negative for back pain, gait problem, neck pain and neck stiffness  Skin: Negative for pallor and rash  Allergic/Immunologic: Negative for environmental allergies and food allergies  Neurological: Negative for dizziness, weakness, numbness and headaches  Psychiatric/Behavioral: Negative for confusion  All other systems reviewed and are negative  Physical Exam  Physical Exam  Vitals and nursing note reviewed  Constitutional:       General: He is awake  Appearance: Normal appearance  He is well-developed  He is not ill-appearing, toxic-appearing or diaphoretic  Comments: /65   Pulse 101   Temp 97 5 °F (36 4 °C)   Resp 18   Ht 5' 10" (1 778 m)   Wt (!) 248 kg (546 lb 4 8 oz)   SpO2 (!) 84%   BMI 78 39 kg/m²      HENT:      Head: Normocephalic and atraumatic  Right Ear: Hearing and external ear normal  No decreased hearing noted  No drainage, swelling or tenderness  No mastoid tenderness  Left Ear: Hearing and external ear normal  No decreased hearing noted  No drainage, swelling or tenderness  No mastoid tenderness  Nose: Nose normal       Mouth/Throat:      Lips: Pink  Mouth: Mucous membranes are moist       Pharynx: Oropharynx is clear  Uvula midline  Eyes:      General: Lids are normal  Vision grossly intact  Right eye: No discharge  Left eye: No discharge  Extraocular Movements: Extraocular movements intact  Conjunctiva/sclera: Conjunctivae normal       Pupils: Pupils are equal, round, and reactive to light  Neck:      Vascular: No JVD  Trachea: Trachea and phonation normal  No tracheal tenderness or tracheal deviation     Cardiovascular: Rate and Rhythm: Normal rate and regular rhythm  Pulses: Normal pulses  Radial pulses are 2+ on the right side and 2+ on the left side  Posterior tibial pulses are 2+ on the right side and 2+ on the left side  Heart sounds: Normal heart sounds  Pulmonary:      Effort: Pulmonary effort is normal       Breath sounds: Normal breath sounds  No stridor  No decreased breath sounds, wheezing, rhonchi or rales  Chest:      Chest wall: No tenderness  Abdominal:      General: Abdomen is flat  Bowel sounds are normal  There is no distension  Palpations: Abdomen is soft  Abdomen is not rigid  Tenderness: There is abdominal tenderness in the periumbilical area  There is no left CVA tenderness, guarding or rebound  Musculoskeletal:         General: Normal range of motion  Cervical back: Full passive range of motion without pain, normal range of motion and neck supple  No rigidity  No spinous process tenderness or muscular tenderness  Normal range of motion  Lymphadenopathy:      Head:      Right side of head: No submental, submandibular, tonsillar, preauricular, posterior auricular or occipital adenopathy  Left side of head: No submental, submandibular, tonsillar, preauricular, posterior auricular or occipital adenopathy  Cervical: No cervical adenopathy  Right cervical: No superficial, deep or posterior cervical adenopathy  Left cervical: No superficial, deep or posterior cervical adenopathy  Skin:     General: Skin is warm  Capillary Refill: Capillary refill takes less than 2 seconds  Neurological:      General: No focal deficit present  Mental Status: He is alert and oriented to person, place, and time  GCS: GCS eye subscore is 4  GCS verbal subscore is 5  GCS motor subscore is 6  Sensory: No sensory deficit     Psychiatric:         Mood and Affect: Mood normal          Speech: Speech normal          Behavior: Behavior normal  Behavior is cooperative  Thought Content:  Thought content normal          Judgment: Judgment normal          Vital Signs  ED Triage Vitals [03/13/22 2131]   Temperature Pulse Respirations Blood Pressure SpO2   97 8 °F (36 6 °C) 87 19 155/60 92 %      Temp Source Heart Rate Source Patient Position - Orthostatic VS BP Location FiO2 (%)   Oral Monitor Lying Right arm --      Pain Score       8           Vitals:    03/14/22 0200 03/14/22 0230 03/14/22 0314 03/14/22 0817   BP: 147/67 140/62 122/71 125/65   Pulse: 92 96 91 101   Patient Position - Orthostatic VS: Sitting Sitting           Visual Acuity      ED Medications  Medications   ammonium lactate (LAC-HYDRIN) 12 % cream (has no administration in time range)   budesonide-formoterol (SYMBICORT) 160-4 5 mcg/act inhaler 2 puff (has no administration in time range)   buPROPion (WELLBUTRIN XL) 24 hr tablet 450 mg (has no administration in time range)   cholecalciferol (VITAMIN D3) tablet 2,000 Units (has no administration in time range)   dabigatran etexilate (PRADAXA) capsule 150 mg (has no administration in time range)   furosemide (LASIX) tablet 80 mg (has no administration in time range)   hydrOXYzine HCL (ATARAX) tablet 25 mg (has no administration in time range)   levothyroxine tablet 25 mcg (25 mcg Oral Given 3/14/22 0609)   metoprolol tartrate (LOPRESSOR) tablet 50 mg (has no administration in time range)   nystatin (MYCOSTATIN) cream (has no administration in time range)   sodium chloride (OCEAN) 0 65 % nasal spray 1 spray (has no administration in time range)   spironolactone (ALDACTONE) tablet 50 mg (has no administration in time range)   acetaminophen (TYLENOL) tablet 650 mg (has no administration in time range)   ondansetron (ZOFRAN) injection 4 mg (4 mg Intravenous Given 3/14/22 0804)   aluminum-magnesium hydroxide-simethicone (MYLANTA) oral suspension 30 mL (has no administration in time range)   dicyclomine (BENTYL) tablet 20 mg (20 mg Oral Given 3/14/22 0609)   pantoprazole (PROTONIX) injection 40 mg (40 mg Intravenous Given 3/14/22 0804)   famotidine (PEPCID) injection 20 mg (20 mg Intravenous Given 3/14/22 0804)   oxyCODONE (ROXICODONE) IR tablet 2 5 mg (has no administration in time range)   oxyCODONE (ROXICODONE) IR tablet 5 mg (5 mg Oral Given 3/14/22 0758)   HYDROmorphone (DILAUDID) injection 0 5 mg (0 5 mg Intravenous Given 3/14/22 0609)   sodium chloride 0 9 % bolus 1,000 mL (0 mL Intravenous Stopped 3/14/22 0017)   HYDROmorphone (DILAUDID) injection 1 mg (1 mg Intravenous Given 3/13/22 2217)   ondansetron (ZOFRAN) injection 4 mg (4 mg Intravenous Given 3/13/22 2214)   famotidine (PEPCID) injection 20 mg (20 mg Intravenous Given 3/13/22 2215)   HYDROmorphone (DILAUDID) injection 0 5 mg (0 5 mg Intravenous Given 3/14/22 0023)   furosemide (LASIX) injection 40 mg (40 mg Intravenous Given 3/14/22 0118)   HYDROmorphone (DILAUDID) injection 0 5 mg (0 5 mg Intravenous Given 3/14/22 0154)   dicyclomine (BENTYL) tablet 20 mg (20 mg Oral Given 3/14/22 0154)       Diagnostic Studies  Results Reviewed     Procedure Component Value Units Date/Time    NT-BNP PRO [[id number]]  (Abnormal) Collected: 03/13/22 2211    Lab Status: Final result Specimen: Blood from Arm, Left Updated: 03/14/22 0204     NT-proBNP 752 pg/mL     Blood gas, venous [[id number]]  (Abnormal) Collected: 03/13/22 2304    Lab Status: Final result Specimen: Blood from Arm, Left Updated: 03/13/22 2320     pH, Graeme 7 330     pCO2, Graeme 79 6 mm Hg      pO2, Graeme 130 5 mm Hg      HCO3, Graeme 41 0 mmol/L      Base Excess, Graeme 11 6 mmol/L      O2 Content, Graeme 18 4 ml/dL      O2 HGB, VENOUS 94 7 %     HS Troponin 0hr (reflex protocol) [[id number]]  (Normal) Collected: 03/13/22 2211    Lab Status: Final result Specimen: Blood from Arm, Left Updated: 03/13/22 2254     hs TnI 0hr 3 ng/L     Comprehensive metabolic panel [[id number]]  (Abnormal) Collected: 03/13/22 2211    Lab Status: Final result Specimen: Blood from Arm, Left Updated: 03/13/22 2252     Sodium 143 mmol/L      Potassium 4 2 mmol/L      Chloride 98 mmol/L      CO2 45 mmol/L      ANION GAP 0 mmol/L      BUN 16 mg/dL      Creatinine 0 99 mg/dL      Glucose 165 mg/dL      Calcium 9 5 mg/dL      Corrected Calcium 10 1 mg/dL      AST 16 U/L      ALT 26 U/L      Alkaline Phosphatase 88 U/L      Total Protein 7 7 g/dL      Albumin 3 2 g/dL      Total Bilirubin 0 69 mg/dL      eGFR 87 ml/min/1 73sq m     Narrative:      [place] guidelines for Chronic Kidney Disease (CKD):     Stage 1 with normal or high GFR (GFR > 90 mL/min/1 73 square meters)    Stage 2 Mild CKD (GFR = 60-89 mL/min/1 73 square meters)    Stage 3A Moderate CKD (GFR = 45-59 mL/min/1 73 square meters)    Stage 3B Moderate CKD (GFR = 30-44 mL/min/1 73 square meters)    Stage 4 Severe CKD (GFR = 15-29 mL/min/1 73 square meters)    Stage 5 End Stage CKD (GFR <15 mL/min/1 73 square meters)  Note: GFR calculation is accurate only with a steady state creatinine    Lactic acid [[id number]]  (Normal) Collected: 03/13/22 2211    Lab Status: Final result Specimen: Blood from Arm, Left Updated: 03/13/22 2249     LACTIC ACID 0 7 mmol/L     Narrative:      Result may be elevated if tourniquet was used during collection      Lipase [[id number]]  (Normal) Collected: 03/13/22 2211    Lab Status: Final result Specimen: Blood from Arm, Left Updated: 03/13/22 2247     Lipase 228 u/L     Protime-INR [[id number]]  (Normal) Collected: 03/13/22 2211    Lab Status: Final result Specimen: Blood from Arm, Left Updated: 03/13/22 2242     Protime 13 3 seconds      INR 1 01    APTT [[id number]]  (Normal) Collected: 03/13/22 2211    Lab Status: Final result Specimen: Blood from Arm, Left Updated: 03/13/22 2242     PTT 32 seconds     CBC and differential [[id number]]  (Abnormal) Collected: 03/13/22 2211    Lab Status: Final result Specimen: Blood from Arm, Left Updated: 03/13/22 2219     WBC 9 41 Thousand/uL      RBC 4 35 Million/uL      Hemoglobin 13 0 g/dL      Hematocrit 39 9 %      MCV 92 fL      MCH 29 9 pg      MCHC 32 6 g/dL      RDW 14 7 %      MPV 10 1 fL      Platelets 452 Thousands/uL      nRBC 0 /100 WBCs      Neutrophils Relative 83 %      Immat GRANS % 1 %      Lymphocytes Relative 10 %      Monocytes Relative 5 %      Eosinophils Relative 1 %      Basophils Relative 0 %      Neutrophils Absolute 7 80 Thousands/µL      Immature Grans Absolute 0 05 Thousand/uL      Lymphocytes Absolute 0 98 Thousands/µL      Monocytes Absolute 0 46 Thousand/µL      Eosinophils Absolute 0 09 Thousand/µL      Basophils Absolute 0 03 Thousands/µL     UA w Reflex to Microscopic w Reflex to Culture [[id number]]     Lab Status: No result Specimen: Urine, Clean Catch                  XR chest 1 view portable   ED Interpretation by Shirin Pollard PA-C (03/13 [id number])   Mild cardiomegaly on initial read                 Procedures  ECG 12 Lead Documentation Only    Date/Time: 3/13/2022 9:57 PM  Performed by: Shirin Pollard PA-C  Authorized by: Shirin Pollard PA-C     Indications / Diagnosis:  Nausea and vomiting and umbilical abdominal pain  ECG reviewed by me, the ED Provider: yes    Patient location:  ED  Previous ECG:     Previous ECG:  Compared to current    Comparison ECG info: When compared with February 18, 2022 no longer in atrial fibrillation    Similarity:  Changes noted    Comparison to cardiac monitor: Yes    Interpretation:     Interpretation: non-specific    Rate:     ECG rate:  88    ECG rate assessment: normal    Rhythm:     Rhythm: sinus rhythm    Ectopy:     Ectopy: none    QRS:     QRS axis:  Normal    QRS intervals:  Normal  Conduction:     Conduction: normal    ST segments:     ST segments:  Normal             ED Course  ED Course as of 03/14/22 0842   Antoinette Loss Mar 13, 2022   2138 Patient reports being on 2 L nasal cannulated oxygen at baseline     2156 Informed by CT scan tech diarrhea that patient body habitus is too large for ED CT scan and cancer Center CT scan, unable to image at this time, will contact General surgery after clinical blood labs are resulted  2241 WBC: 9 41   2241 Absolute Neutrophils(!): 7 80   2252 Albumin(!): 3 2   2253 Anion Gap(!): 0   Mon Mar 14, 2022   0002 Will order obstruction series with inability to have ct scan done at this facility; was informed by ED x-ray technician that weight limit of patient exceeds x-ray table and loss reports and ability to have obstruction series performed  0101 Report of shortness of breath symptoms after surgical evaluation; BNP ordered   0146 Patient remains in pain will delivered more pain medication and contact slim  SBIRT 20yo+      Most Recent Value   SBIRT (22 yo +)    In order to provide better care to our patients, we are screening all of our patients for alcohol and drug use  Would it be okay to ask you these screening questions? Unable to answer at this time Filed at: 03/13/2022 2238                    MDM  Number of Diagnoses or Management Options  Nausea and vomiting: new and requires workup  Umbilical pain: new and requires workup     Amount and/or Complexity of Data Reviewed  Clinical lab tests: ordered and reviewed  Tests in the radiology section of CPT®: reviewed and ordered  Review and summarize past medical records: yes  Independent visualization of images, tracings, or specimens: yes    Risk of Complications, Morbidity, and/or Mortality  Presenting problems: moderate  Diagnostic procedures: moderate  Management options: moderate    Patient Progress  Patient progress: stable      Patient is a 28-year-old male with history of COPD, GERD, hypertension, hypokalemia, hypothyroidism, BMI greater than 40, cholecystectomy, gastrectomy and sleeve laparoscopic, abdominal hernia repair the presents emergency department with persistent dull aching nonradiating generalized abdominal pain symptoms for 2 days      Patient hemodynamically stable and afebrile  No sirs  PH normal, patient with baseline carbon dioxide, increased oxygen levels, the patient appears to be compensated; patient's is on chronic 2 L nasal cannula oxygen  Chest x-ray with no acute cardiopulmonary disease on initial read  Normal troponin, ECG with normal sinus rhythm  No leukocytosis, no banding  Normal lipase, doubt acute pancreatitis  Unremarkable BNP, doubt CHF flare  Delivered dilaudid and zofran in the emergency department; patient demonstrates decrease in presenting abdominal pain and nausea/ vomiting ED symptomatology status post medication delivery  Discussed patient case with Marco A Schaffer and both agreed to place patient under the care of Dr Criss Lockwood, Internal Medicine  Patient with verbal understanding of all clinical laboratory and imaging findings, admission instructions and verbalized agreement with the current treatment plan  Disposition  Final diagnoses:   Umbilical pain   History of abdominal surgery   BMI 70 and over, adult ([facility] [address] )   Nausea and vomiting     Time reflects when diagnosis was documented in both MDM as applicable and the Disposition within this note     Time User Action Codes Description Comment    3/14/2022 12:10 AM Daryle Gouge Add [E63 32] Umbilical pain     5/18/4767 12:10 AM Daryle Gouge Add [Z98 890] History of abdominal surgery     3/14/2022 12:11 AM Daryle Gouge Add [Z68 45] BMI 70 and over, adult ([address] )     3/14/2022 12:11 AM Daryle Gouge Add [R11 2] Nausea and vomiting     3/14/2022  3:33 AM Panda Ayala Add [I50 33] Acute on chronic diastolic congestive heart failure [facility])       ED Disposition     ED Disposition Condition Date/Time Comment    Admit Stable Mon Mar 14, 2022  1:52 AM Case was discussed with DA and the patient's admission status was agreed to be Admission Status: observation status to the service of Dr Criss Lockwood, Internal Medcine           Follow-up Information    None         Current Discharge Medication List      CONTINUE these medications which have NOT CHANGED    Details   acetaminophen (TYLENOL) 500 mg tablet Take 1,000 mg by mouth every 6 (six) hours as needed for mild pain      albuterol (2 5 mg/3 mL) 0 083 % nebulizer solution Take 2 5 mg by nebulization every 6 (six) hours as needed for wheezing or shortness of breath      ammonium lactate (LAC-HYDRIN) 12 % cream Apply topically 2 (two) times a day  Qty: 385 g, Refills: 0    Associated Diagnoses: Morbid obesity ([facility])      budesonide-formoterol (SYMBICORT) 160-4 5 mcg/act inhaler Inhale 2 puffs 2 (two) times a day Rinse mouth after use  buPROPion (FORFIVO XL) 450 MG 24 hr tablet Take 1 tablet (450 mg total) by mouth daily  Refills: 0    Associated Diagnoses: CHF (congestive heart failure) ([facility])      Cholecalciferol (VITAMIN D) 50 MCG (2000 UT) tablet Take 2,000 Units by mouth daily      cyanocobalamin (VITAMIN B-12) 1000 MCG tablet Take 1,000 mcg by mouth daily      dabigatran etexilate (PRADAXA) 150 mg capsu Take 150 mg by mouth 2 (two) times a day      furosemide (LASIX) 80 mg tablet Take 1 tablet (80 mg total) by mouth 2 (two) times a day  Refills: 0    Associated Diagnoses: CHF (congestive heart failure) ([facility])      hydrOXYzine pamoate (VISTARIL) 25 mg capsule Take 25 mg by mouth Three times daily as needed      ipratropium-albuterol (DUO-NEB) 0 5-2 5 mg/3 mL nebulizer solution Take 3 mL by nebulization 4 (four) times a day      levothyroxine 25 mcg tablet Take 25 mcg by mouth daily      linezolid (ZYVOX) 600 mg tablet       metoprolol tartrate (LOPRESSOR) 50 mg tablet Take 1 tablet (50 mg total) by mouth every 12 (twelve) hours  Refills: 0    Associated Diagnoses: CHF (congestive heart failure) ([facility])      nystatin (MYCOSTATIN) cream Apply topically 2 (two) times a day  Qty: 30 g, Refills: 0    Associated Diagnoses:  Morbid obesity ([facility])      pantoprazole (PROTONIX) 20 mg tablet Take 20 mg by mouth daily      sodium chloride (OCEAN) 0 65 % nasal spray 1 spray into each nostril as needed for congestion  Refills: 0    Associated Diagnoses: Nasal congestion      spironolactone (ALDACTONE) 50 mg tablet Take 50 mg by mouth daily             No discharge procedures on file      PDMP Review       Value Time User    PDMP Reviewed  Yes 3/14/2022  8:42 AM Elvia Scanlon PA-C          ED Provider  Electronically Signed by           Elvia Scanlon PA-C  03/14/22 5142

## 2022-03-14 NOTE — CONSULTS
Cardiology   Nat Hanks 47 y o  male MRN: [id number]  Unit/Bed#: W -04 Encounter: [id number]      Reason for Consult / Principal Problem:  CHF    Physician Requesting Consult:  Marlys Herrera MD    Cardiologist: Dr Alex Hayes [facility])    Assessment  1  Severe abdominal pain w/ associated nausea/vomiting   -Hx of ventral hernia s/p repair    -Unable to obtain CT scan/obstruction series given his body habitus   -Evaluated by the general surgery service - no acute surgical intervention /recommending conservative medical management   -At the time of my evaluation still having quite extensive abdominal pain on exam with nausea/vomiting - emesis occasionally black/coffee-ground in appearance  2  Acute on chronic diastolic CHF  -Difficult to assess volume status given body habitus   -Subjectively with complaints of increasing SOB/SHARIF and chest pressure for the past 2-3 days  -X-ray imaging - possible pulmonary vascular congestion - though limited study given his body habitus  -NT proBNP level - 752 (previously 607 - in February 2022)  -Previously on oral furosemide 80 mg b i d  (oral diuretics currently on)  -Currently on IV furosemide 60 mg b i d   -24 hour I&O balance; +150 ml  -Difficult to ascertain his true baseline dry weights  Recent weight at his nursing home facility via [address] was 534 lbs  3  Permanent atrial fibrillation   -12 lead ECG 3/13; atrial fibrillation, rate controlled   -Non telemetry currently  -On metoprolol tartrate 50 mg q 12 hours  -On Pradaxa 150 mg b i d   4  Essential hypertension  -BP stable 125/65,   -Outpatient BP regimen; furosemide 80 mg BID, and metoprolol tartrate 50 mg q 12 hours  5  DVT s/p IVC filter, and on Pradaxa 150 mg BID  6  Morbid obesity; BMI 78  7  OHS / chronic hypoxic/hypercapnic respiratory failure - chronically wears 2 L of supplemental O2 at baseline  8  FILIPPO- on CPAP at HS    Plan  -Agree with IV diuresis    Currently on IV furosemide 60 mg b i d - will assess his response to this  -Would recommend GI consultation in reference to # 1  Given hematopoiesis/ black-coffee-ground emesis would hold his Pradaxa for right now until formal evaluation by GI     -Continue metoprolol tartrate 50 mg q 12 hours   -Strict I&Os, daily weights, 2 g NA +diet 1 8 L FR   -Monitor renal function electrolytes closely  Replete to maintain K +level of 4 0, magnesium 2 0   -Pt needs extensive diet/lifestyle modifications and weight loss  -No indication for telemetry monitoring  HPI: Karol Dueñas 47y o  year old male with a medical history of chronic diastolic CHF, permanent atrial fibrillation, essential hypertension, chronic hypoxic/hypercapnic respiratory failure - wears 2 L of supplemental O2 at baseline, obstructive sleep apnea- compliant with CPAP, DVT (IVC filter in place, and on systemic anticoagulation), and morbid obesity with BMI >70  Currently resides at [address] [place] [address]  He routinely follows with Dr Hattie Duran w/[facility] last seen as an outpatient on 2/21/2022  Overall reported been doing well from a CHF standpoint  No weight was recorded during this office visit  He presented to the [facility] on 3/13/2022 a with a 2-3 day history of severe abdominal pain with associated nausea and vomiting  He also admits to some mild increased shortness of breath/dyspnea over that time as well  He states his diet has been relatively poor at the nursing home facility often eating foods that are high in sodium but expresses compliance with taking his oral diuretics  He states the last time he was weighed at the nursing home facility was via a [employer] lift and his weight was 534 lbs  Further workup in the ED  Hemodynamics on admission  -Temp 97 8° F, HR 87, RR 19, /60, sat 92% on RA    Laboratory data on admission  -NA +143, K +4 2, chloride 98, CO2 45, anion gap 0, BUN 16, creatinine 0 9, glucose 165, calcium 9 5, normal LFTs, albumin 3 2, WBC 9 4, HGB 13 0, and platelet count 239  -Lactic acid 0 7  -HS troponin level -3  -NT proBNP level - 752 (previously 607 - in February 2022)  Imaging  -Chest x-ray - mildly limited evaluation due to body habitus, lungs are clear, no pneumothorax or pleural effusion per radiology read  Initial 12 lead ECG demonstrated rate controlled atrial fibrillation  He was evaluated by the general surgery service in reference to his abdominal pain home felt the he did not have any surgical intervention requirements upon their evaluation and recommended conservative medical management  He was initiated on a course of IV diuresis given his progressive symptoms of SOB/SHARIF and chest pressure with possible evidence of vascular congestion on x-ray imaging, with evidence of lower extremity edema  Cardiology was consulted for further treatment recommendations/management            Family History:   Family History   Problem Relation Age of Onset   Carlin Mercado Lung cancer Father     Diabetes Maternal Aunt     Heart attack Mother     Clotting disorder Mother     Hypertension Mother     Heart failure Mother     Diabetes Mother     Atrial fibrillation Mother     Sleep apnea Mother     Obesity Mother     Asthma Sister     Stroke Neg Hx     Anuerysm Neg Hx     Arrhythmia Neg Hx     Hyperlipidemia Neg Hx         pt unsure    Fainting Neg Hx      Historical Information   Past Medical History:   Diagnosis Date    [facility]     Anemia     Anxiety     Cancer [facility]     Cardiac disease     Cellulitis     COPD [facility]chronic obstructive [facility] disease) [facility]     Depression     [facility] mellitus [facility]     [facility]     GERD (gastroesophageal reflux disease)     HBP (high blood pressure)     Heart failure ([facility]     Hx of blood clots     Hypertension     Hypokalemia     Hypothyroid     Obesity     Psychiatric disorder      Past Surgical History:   Procedure Laterality Date    ABDOMINAL HERNIA REPAIR  05/2019  CHOLECYSTECTOMY      Lap    GASTRECTOMY SLEEVE LAPAROSCOPIC  08/2018    INCISION AND DRAINAGE OF WOUND Bilateral 12/1/2021    Procedure: INCISION AND DRAINAGE (I&D) HEAD/FACE;  Surgeon: Addison Mandel MD;  Location: [facility];  Service: General    KNEE SURGERY Right     open wound, injury     LEG SURGERY Right 2009    US GUIDED VASCULAR ACCESS  8/6/2018     Social History   Social History     Substance and Sexual Activity   Alcohol Use Not Currently     Social History     Substance and Sexual Activity   Drug Use No     Social History     Tobacco Use   Smoking Status Former Smoker    Packs/day: 1 00    Years: 15 00    Pack years: 15 00   Smokeless Tobacco Never Used   Tobacco Comment    1 5ppd x 23 years, quit 2014     Family History:   Family History   Problem Relation Age of Onset   Daniela Brandt Lung cancer Father     Diabetes Maternal Aunt     Heart attack Mother     Clotting disorder Mother     Hypertension Mother     Heart failure Mother     Diabetes Mother     Atrial fibrillation Mother     Sleep apnea Mother     Obesity Mother     Asthma Sister     Stroke Neg Hx     Anuerysm Neg Hx     Arrhythmia Neg Hx     Hyperlipidemia Neg Hx         pt unsure    Fainting Neg Hx        Review of Systems:  Review of Systems   Constitutional: Positive for activity change, fatigue and unexpected weight change  Negative for appetite change, chills and fever  Respiratory: Negative for cough, chest tightness and shortness of breath  Gastrointestinal: Positive for abdominal distention, abdominal pain, nausea and vomiting  Neurological: Negative for dizziness, light-headedness and headaches  All other systems reviewed and are negative            Scheduled Meds:  Current Facility-Administered Medications   Medication Dose Route Frequency Provider Last Rate    acetaminophen  650 mg Oral Q6H PRN RADHA Golden      aluminum-magnesium hydroxide-simethicone  30 mL Oral Q6H PRN RADHA Golden  ammonium lactate   Topical BID Sharon Cardinal, CRNP      budesonide-formoterol  2 puff Inhalation BID Sharon Cardinal, CRNP      buPROPion  450 mg Oral Daily Sharon Cardinal, CRNP      cholecalciferol  2,000 Units Oral Daily Sharon Cardinal, CRNP      dabigatran etexilate  150 mg Oral BID Sharon Cardinal, CRNP      dicyclomine  20 mg Oral 4x Daily (AC & HS) Sharon Cardinal, CRNP      famotidine  20 mg Intravenous Q24H [address] [facility], CRNP      furosemide  60 mg Intravenous BID Sharon Cardinal, CRNP      HYDROmorphone  0 5 mg Intravenous Q4H PRN Sharon Cardinal, CRNP      hydrOXYzine HCL  25 mg Oral TID PRN Sharon Cardinal, CRNP      levothyroxine  25 mcg Oral Early Morning Sharon Cardinal, CRNP      metoprolol tartrate  50 mg Oral Q12H [address] Sharon Cardinal, CRNP      nystatin   Topical BID Sharon Cardinal, CRNP      ondansetron  4 mg Intravenous Q6H PRN Sharon Cardinal, CRNP      oxyCODONE  2 5 mg Oral Q6H PRN Sharon Cardinal, CRNP      oxyCODONE  5 mg Oral Q6H PRN Sharon Cardinal, CRNP      pantoprazole  40 mg Intravenous Q12H [address] Sharon Cardinal, CRNP      sodium chloride  1 spray Each Nare Q2H PRN Sharon Cardinal, CRNP      spironolactone  50 mg Oral Daily Sharon Cardinal, CRNP       Continuous Infusions:   PRN Meds:   acetaminophen    aluminum-magnesium hydroxide-simethicone    HYDROmorphone    hydrOXYzine HCL    ondansetron    oxyCODONE    oxyCODONE    sodium chloride  all current active meds have been reviewed and current meds:   Current Facility-Administered Medications   Medication Dose Route Frequency    acetaminophen (TYLENOL) tablet 650 mg  650 mg Oral Q6H PRN    aluminum-magnesium hydroxide-simethicone (MYLANTA) oral suspension 30 mL  30 mL Oral Q6H PRN    ammonium lactate (LAC-HYDRIN) 12 % cream   Topical BID    budesonide-formoterol (SYMBICORT) 160-4 5 mcg/act inhaler 2 puff  2 puff Inhalation BID    buPROPion (WELLBUTRIN XL) 24 hr tablet 450 mg  450 mg Oral Daily    cholecalciferol (VITAMIN D3) tablet 2,000 Units  2,000 Units Oral Daily    dabigatran etexilate (PRADAXA) capsule 150 mg  150 mg Oral BID    dicyclomine (BENTYL) tablet 20 mg  20 mg Oral 4x Daily (AC & HS)    famotidine (PEPCID) injection 20 mg  20 mg Intravenous Q24H MARY    furosemide (LASIX) injection 60 mg  60 mg Intravenous BID    HYDROmorphone (DILAUDID) injection 0 5 mg  0 5 mg Intravenous Q4H PRN    hydrOXYzine HCL (ATARAX) tablet 25 mg  25 mg Oral TID PRN    levothyroxine tablet 25 mcg  25 mcg Oral Early Morning    metoprolol tartrate (LOPRESSOR) tablet 50 mg  50 mg Oral Q12H MARY    nystatin (MYCOSTATIN) cream   Topical BID    ondansetron (ZOFRAN) injection 4 mg  4 mg Intravenous Q6H PRN    oxyCODONE (ROXICODONE) IR tablet 2 5 mg  2 5 mg Oral Q6H PRN    oxyCODONE (ROXICODONE) IR tablet 5 mg  5 mg Oral Q6H PRN    pantoprazole (PROTONIX) injection 40 mg  40 mg Intravenous Q12H MARY    sodium chloride (OCEAN) 0 65 % nasal spray 1 spray  1 spray Each Nare Q2H PRN    spironolactone (ALDACTONE) tablet 50 mg  50 mg Oral Daily       Allergies   Allergen Reactions    Penicillins Hives       Objective   Vitals: Blood pressure 125/65, pulse 101, temperature 97 5 °F (36 4 °C), resp  rate 18, height 5' 10" (1 778 m), weight (!) 248 kg (546 lb 4 8 oz), SpO2 92 %  , Body mass index is 78 39 kg/m² ,   Orthostatic Blood Pressures      Most Recent Value   Blood Pressure 125/65 filed at 03/14/2022 0817   Patient Position - Orthostatic VS Sitting filed at 03/14/2022 0230            Intake/Output Summary (Last 24 hours) at 3/14/2022 1332  Last data filed at 3/14/2022 0545  Gross per 24 hour   Intake 1000 ml   Output 850 ml   Net 150 ml       Invasive Devices  Report    Peripheral Intravenous Line            Peripheral IV 03/13/22 Left;Ventral (anterior); Lateral Forearm <1 day              Physical Exam:  Physical Exam  Vitals and nursing note reviewed     Constitutional:       General: He is not in acute distress  Appearance: He is obese  He is ill-appearing  He is not diaphoretic  HENT:      Head: Normocephalic and atraumatic  Mouth/Throat:      Mouth: Mucous membranes are moist    Eyes:      General: No scleral icterus  Cardiovascular:      Rate and Rhythm: Normal rate  Rhythm irregular  Pulses: Normal pulses  Heart sounds: Normal heart sounds  Pulmonary:      Comments: Bilateral breath sounds are very diminished  No crackles  Abdominal:      General: There is distension  Tenderness: There is abdominal tenderness  There is guarding  Comments: Morbidly obese abdomen  Musculoskeletal:         General: Swelling present  Cervical back: Neck supple  Right lower leg: Edema present  Left lower leg: Edema present  Skin:     General: Skin is warm and dry  Capillary Refill: Capillary refill takes less than 2 seconds  Neurological:      General: No focal deficit present  Mental Status: He is alert and oriented to person, place, and time     Psychiatric:         Mood and Affect: Mood normal          Lab Results:   Recent Results (from the past 24 hour(s))   CBC and differential    Collection Time: 03/13/22 10:11 PM   Result Value Ref Range    WBC 9 41 4 31 - 10 16 Thousand/uL    RBC 4 35 3 88 - 5 62 Million/uL    Hemoglobin 13 0 12 0 - 17 0 g/dL    Hematocrit 39 9 36 5 - 49 3 %    MCV 92 82 - 98 fL    MCH 29 9 26 8 - 34 3 pg    MCHC 32 6 31 4 - 37 4 g/dL    RDW 14 7 11 6 - 15 1 %    MPV 10 1 8 9 - 12 7 fL    Platelets 088 397 - 566 Thousands/uL    nRBC 0 /100 WBCs    Neutrophils Relative 83 (H) 43 - 75 %    Immat GRANS % 1 0 - 2 %    Lymphocytes Relative 10 (L) 14 - 44 %    Monocytes Relative 5 4 - 12 %    Eosinophils Relative 1 0 - 6 %    Basophils Relative 0 0 - 1 %    Neutrophils Absolute 7 80 (H) 1 85 - 7 62 Thousands/µL    Immature Grans Absolute 0 05 0 00 - 0 20 Thousand/uL    Lymphocytes Absolute 0 98 0 60 - 4 47 Thousands/µL    Monocytes Absolute 0 46 0 17 - 1 22 Thousand/µL    Eosinophils Absolute 0 09 0 00 - 0 61 Thousand/µL    Basophils Absolute 0 03 0 00 - 0 10 Thousands/µL   Protime-INR    Collection Time: 03/13/22 10:11 PM   Result Value Ref Range    Protime 13 3 11 6 - 14 5 seconds    INR 1 01 0 84 - 1 19   APTT    Collection Time: 03/13/22 10:11 PM   Result Value Ref Range    PTT 32 23 - 37 seconds   Comprehensive metabolic panel    Collection Time: 03/13/22 10:11 PM   Result Value Ref Range    Sodium 143 136 - 145 mmol/L    Potassium 4 2 3 5 - 5 3 mmol/L    Chloride 98 (L) 100 - 108 mmol/L    CO2 45 (H) 21 - 32 mmol/L    ANION GAP 0 (L) 4 - 13 mmol/L    BUN 16 5 - 25 mg/dL    Creatinine 0 99 0 60 - 1 30 mg/dL    Glucose 165 (H) 65 - 140 mg/dL    Calcium 9 5 8 3 - 10 1 mg/dL    Corrected Calcium 10 1 8 3 - 10 1 mg/dL    AST 16 5 - 45 U/L    ALT 26 12 - 78 U/L    Alkaline Phosphatase 88 46 - 116 U/L    Total Protein 7 7 6 4 - 8 2 g/dL    Albumin 3 2 (L) 3 5 - 5 0 g/dL    Total Bilirubin 0 69 0 20 - 1 00 mg/dL    eGFR 87 ml/min/1 73sq m   HS Troponin 0hr (reflex protocol)    Collection Time: 03/13/22 10:11 PM   Result Value Ref Range    hs TnI 0hr 3 "Refer to ACS Flowchart"- see link ng/L   Lipase    Collection Time: 03/13/22 10:11 PM   Result Value Ref Range    Lipase 228 73 - 393 u/L   Lactic acid    Collection Time: 03/13/22 10:11 PM   Result Value Ref Range    LACTIC ACID 0 7 0 5 - 2 0 mmol/L   NT-BNP PRO    Collection Time: 03/13/22 10:11 PM   Result Value Ref Range    NT-proBNP 752 (H) <125 pg/mL   Blood gas, venous    Collection Time: 03/13/22 11:04 PM   Result Value Ref Range    pH, Graeme 7 330 7 300 - 7 400    pCO2, Graeme 79 6 (H) 42 0 - 50 0 mm Hg    pO2, [place] 130 5 (H) 35 0 - 45 0 mm Hg    HCO3, [place]eme 41 0 (H) 24 - 30 mmol/L    Base Excess, Graeme 11 6 mmol/L    O2 Content, Graeme 18 4 ml/dL    O2 HGB, VENOUS 94 7 (H) 60 0 - 80 0 %   COVID/FLU/RSV    Collection Time: 03/14/22  3:53 AM    Specimen: Nasopharyngeal Swab; Nares   Result Value Ref Range    SARS-CoV-2 Negative Negative    INFLUENZA A PCR Negative Negative    INFLUENZA B PCR Negative Negative    RSV PCR Negative Negative   Basic metabolic panel    Collection Time: 03/14/22  6:24 AM   Result Value Ref Range    Sodium 143 136 - 145 mmol/L    Potassium 4 8 3 5 - 5 3 mmol/L    Chloride 98 (L) 100 - 108 mmol/L    CO2 40 (H) 21 - 32 mmol/L    ANION GAP 5 4 - 13 mmol/L    BUN 16 5 - 25 mg/dL    Creatinine 0 97 0 60 - 1 30 mg/dL    Glucose 174 (H) 65 - 140 mg/dL    Glucose, Fasting 174 (H) 65 - 99 mg/dL    Calcium 9 7 8 3 - 10 1 mg/dL    eGFR 90 ml/min/1 73sq m   CBC and differential    Collection Time: 03/14/22  6:24 AM   Result Value Ref Range    WBC 11 37 (H) 4 31 - 10 16 Thousand/uL    RBC 4 81 3 88 - 5 62 Million/uL    Hemoglobin 14 2 12 0 - 17 0 g/dL    Hematocrit 45 7 36 5 - 49 3 %    MCV 95 82 - 98 fL    MCH 29 5 26 8 - 34 3 pg    MCHC 31 1 (L) 31 4 - 37 4 g/dL    RDW 14 6 11 6 - 15 1 %    MPV 10 1 8 9 - 12 7 fL    Platelets 459 756 - 681 Thousands/uL    nRBC 0 /100 WBCs    Neutrophils Relative 90 (H) 43 - 75 %    Immat GRANS % 1 0 - 2 %    Lymphocytes Relative 5 (L) 14 - 44 %    Monocytes Relative 4 4 - 12 %    Eosinophils Relative 0 0 - 6 %    Basophils Relative 0 0 - 1 %    Neutrophils Absolute 10 24 (H) 1 85 - 7 62 Thousands/µL    Immature Grans Absolute 0 11 0 00 - 0 20 Thousand/uL    Lymphocytes Absolute 0 59 (L) 0 60 - 4 47 Thousands/µL    Monocytes Absolute 0 40 0 17 - 1 22 Thousand/µL    Eosinophils Absolute 0 00 0 00 - 0 61 Thousand/µL    Basophils Absolute 0 03 0 00 - 0 10 Thousands/µL     Imaging: I have personally reviewed pertinent reports  and I have personally reviewed pertinent films in PACS  Code Status:  Level 1 full code  Epic/ Allscripts/Care Everywhere records reviewed:  Yes    * Please Note: Fluency DirectDictation voice to text software may have been used in the creation of this document   **

## 2022-03-14 NOTE — CONSULTS
Consultation -  [facility]  Graciela Kuhn 47 y o  male MRN: [id number]  Unit/Bed#: W -01 Encounter: [id number]        Inpatient consult to gastroenterology  Consult performed by: Jorge Camp PA-C  Consult ordered by: Dee Cao MD          Reason for Consult / Principal Problem:  Abdominal pain, nausea, coffee-ground emesis    ASSESSMENT and PLAN:      1  Intractable abdominal pain with nausea and vomiting  2  Coffee-ground emesis  Patient with a history of gastric sleeve with reports of abdominal pain x3 days which worsened this morning accompanied by nausea and vomiting  After few episodes of retching patient noted bright red blood, which has since resolved  Hemoglobin stable this morning at 14  Vital signs stable  Bowel movement prior to admission  BUN normal   Last EGD 15 years ago per patient  Possibly secondary to Stefany-Fernandez tear versus severe gastritis  -recommend PPI IV b i d   -recommend Carafate prior to meals   -diet as tolerated  Recommend clear liquid diet for now and slowly advancing   -agree with continuing Bentyl   -consider EGD tomorrow if persistent hematemesis, drop in hemoglobin   -Pradaxa was placed on hold after episode of hematemesis    -appreciate surgery recommendations  No surgical intervention necessary for hernia at this time   -antiemetics and pain management per primary team     -------------------------------------------------------------------------------------------------------------------    HPI:     Graciela Kuhn is a 64-year-old male with past medical history of FILIPPO with BMI of 78, gastric sleeve surgery, acute on chronic heart failure, AFib, prior DVT on Pradaxa who presented to the emergency room yesterday for abdominal pain  Vital signs were stable on arrival   Abdominal imaging was unable to be performed due to BMI  BUN normal   LFTs normal   Lipase normal   CBC with normal hemoglobin of 13 and white count of 9   GI was consulted due to episode of coffee-ground emesis today  Repeat hemoglobin today still stable at 14  BUN stable at 16  Vital signs stable  Patient is on 4 L nasal cannula  Patient reports he has been having abdominal pain for the last 3 days which worsened yesterday  He reports pain is mostly above his umbilicus  He reports waking up around 4 in the morning with nausea, vomiting and dry heaves  He reports the 1st few episodes were nonbloody  He then reports having some bright red blood noted with the emesis  Most recently his episode of emesis was brown  He reports he takes a PPI daily  He denies any recent NSAID use  He is unsure if he had a Feliberto-en-Y or a gastric sleeve however per chart review it appears he had sleeve gastrectomy  Yoel Side He reports bowel movements to change between constipation and diarrhea however no new changes  Surgery saw patient this morning due to ventral hernias who reported that hernias are soft with normal medicines therefore no surgical intervention necessary at this time  REVIEW OF SYSTEMS:    CONSTITUTIONAL: Denies any fever, chills, or rigors  + decreased appetite  HEENT: No earache or tinnitus  Denies hearing loss or visual disturbances  CARDIOVASCULAR: No chest pain or palpitations  RESPIRATORY: Denies any cough, hemoptysis, shortness of breath or dyspnea on exertion  GASTROINTESTINAL: As noted in the History of Present Illness  GENITOURINARY: No problems with urination  Denies any hematuria or dysuria  NEUROLOGIC: No dizziness or vertigo, denies headaches  MUSCULOSKELETAL: Denies any muscle or joint pain  SKIN: Denies skin rashes or itching  ENDOCRINE: Denies excessive thirst  Denies intolerance to heat or cold  PSYCHOSOCIAL: Denies depression or anxiety  Denies any recent memory loss         Historical Information   Past Medical History:   Diagnosis Date    Afib ([facility] )     Anemia     Anxiety     Cancer ([facility] )     Cardiac disease     Cellulitis     [facility]     Depression     Diabetes mellitus ([facility]     DVT (deep venous thrombosis) (HCC)     GERD (gastroesophageal reflux disease)     HBP (high blood pressure)     Heart failure (HCC)     Hx of blood clots     Hypertension     Hypokalemia     Hypothyroid     Obesity     Psychiatric disorder      Past Surgical History:   Procedure Laterality Date    ABDOMINAL HERNIA REPAIR  05/2019    CHOLECYSTECTOMY      Lap    GASTRECTOMY SLEEVE LAPAROSCOPIC  08/2018    INCISION AND DRAINAGE OF WOUND Bilateral 12/1/2021    Procedure: INCISION AND DRAINAGE (I&D) HEAD/FACE;  Surgeon: Sri Davis MD;  Location: [facility];  Service: General    KNEE SURGERY Right     open wound, injury     LEG SURGERY Right 2009    US GUIDED VASCULAR ACCESS  8/6/2018     Social History   Social History     Substance and Sexual Activity   Alcohol Use Not Currently     Social History     Substance and Sexual Activity   Drug Use No     Social History     Tobacco Use   Smoking Status Former Smoker    Packs/day: 1 00    Years: 15 00    Pack years: 15 00   Smokeless Tobacco Never Used   Tobacco Comment    1 5ppd x 23 years, quit 2014     Family History   Problem Relation Age of Onset    Lung cancer Father     Diabetes Maternal Aunt     Heart attack Mother     Clotting disorder Mother     Hypertension Mother     Heart failure Mother     Diabetes Mother     Atrial fibrillation Mother     Sleep apnea Mother     Obesity Mother     Asthma Sister     Stroke Neg Hx     Anuerysm Neg Hx     Arrhythmia Neg Hx     Hyperlipidemia Neg Hx         pt unsure    Fainting Neg Hx        Meds/Allergies     Medications Prior to Admission   Medication    acetaminophen (TYLENOL) 500 mg tablet    albuterol (2 5 mg/3 mL) 0 083 % nebulizer solution    ammonium lactate (LAC-HYDRIN) 12 % cream    budesonide-formoterol (SYMBICORT) 160-4 5 mcg/act inhaler    buPROPion (FORFIVO XL) 450 MG 24 hr tablet  Cholecalciferol (VITAMIN D) 50 MCG (2000 UT) tablet    cyanocobalamin (VITAMIN B-12) 1000 MCG tablet    dabigatran etexilate (PRADAXA) 150 mg capsu    furosemide (LASIX) 80 mg tablet    hydrOXYzine pamoate (VISTARIL) 25 mg capsule    ipratropium-albuterol (DUO-NEB) 0 5-2 5 mg/3 mL nebulizer solution    levothyroxine 25 mcg tablet    linezolid (ZYVOX) 600 mg tablet    metoprolol tartrate (LOPRESSOR) 50 mg tablet    nystatin (MYCOSTATIN) cream    pantoprazole (PROTONIX) 20 mg tablet    sodium chloride (OCEAN) 0 65 % nasal spray    spironolactone (ALDACTONE) 50 mg tablet     Current Facility-Administered Medications   Medication Dose Route Frequency    acetaminophen (TYLENOL) tablet 650 mg  650 mg Oral Q6H PRN    aluminum-magnesium hydroxide-simethicone (MYLANTA) oral suspension 30 mL  30 mL Oral Q6H PRN    ammonium lactate (LAC-HYDRIN) 12 % cream   Topical BID    budesonide-formoterol (SYMBICORT) 160-4 5 mcg/act inhaler 2 puff  2 puff Inhalation BID    buPROPion (WELLBUTRIN XL) 24 hr tablet 450 mg  450 mg Oral Daily    cholecalciferol (VITAMIN D3) tablet 2,000 Units  2,000 Units Oral Daily    dicyclomine (BENTYL) tablet 20 mg  20 mg Oral 4x Daily (AC & HS)    famotidine (PEPCID) injection 20 mg  20 mg Intravenous Q24H MARY    furosemide (LASIX) injection 60 mg  60 mg Intravenous BID    HYDROmorphone (DILAUDID) injection 0 5 mg  0 5 mg Intravenous Q4H PRN    hydrOXYzine HCL (ATARAX) tablet 25 mg  25 mg Oral TID PRN    levothyroxine tablet 25 mcg  25 mcg Oral Early Morning    metoprolol tartrate (LOPRESSOR) tablet 50 mg  50 mg Oral Q12H MARY    nystatin (MYCOSTATIN) cream   Topical BID    ondansetron (ZOFRAN) injection 4 mg  4 mg Intravenous Q6H PRN    oxyCODONE (ROXICODONE) IR tablet 2 5 mg  2 5 mg Oral Q6H PRN    oxyCODONE (ROXICODONE) IR tablet 5 mg  5 mg Oral Q6H PRN    pantoprazole (PROTONIX) 80 mg in sodium chloride 0 9 % 100 mL infusion  8 mg/hr Intravenous Continuous    sodium chloride (OCEAN) 0 65 % nasal spray 1 spray  1 spray Each Nare Q2H PRN    spironolactone (ALDACTONE) tablet 50 mg  50 mg Oral Daily       Allergies   Allergen Reactions    Penicillins Hives           Objective     Blood pressure 125/65, pulse 101, temperature 97 5 °F (36 4 °C), resp  rate 18, height 5' 10" (1 778 m), weight (!) 248 kg (546 lb 4 8 oz), SpO2 92 %  Intake/Output Summary (Last 24 hours) at 3/14/2022 1433  Last data filed at 3/14/2022 1403  Gross per 24 hour   Intake 1000 ml   Output 1300 ml   Net -300 ml         PHYSICAL EXAM:      General Appearance:   Patient calling out from room for nurse for pain medication prior to arrival   During exam appears alert and oriented and does not appear in distress      HEENT:   Normocephalic, atraumatic, anicteric  Neck:  Supple, symmetrical, trachea midline, no adenopathy;    thyroid: no enlargement/tenderness/nodules; no carotid  bruit or JVD    Lungs:   Clear to auscultation bilaterally; no rales, rhonchi or wheezing; respirations unlabored    Heart[de-identified]   S1 and S2 normal; regular rate and rhythm; no murmur, rub, or gallop  Abdomen:   + ventral hernia noted which are soft, no erythema noted  Reports tenderness to palpation above the umbilicus  Bowel sounds noted     Genitalia:   Deferred    Rectal:   Deferred    Extremities:  No cyanosis, clubbing or edema    Pulses:  2+ and symmetric all extremities    Skin:  Skin color, texture, turgor normal, no rashes or lesions    Lymph nodes:  No palpable cervical, axillary or inguinal lymphadenopathy        Lab Results:   Results from last 7 days   Lab Units 03/14/22  0624   WBC Thousand/uL 11 37*   HEMOGLOBIN g/dL 14 2   HEMATOCRIT % 45 7   PLATELETS Thousands/uL 202   NEUTROS PCT % 90*   LYMPHS PCT % 5*   MONOS PCT % 4   EOS PCT % 0     Results from last 7 days   Lab Units 03/14/22  0624 03/13/22  2211 03/13/22  2211   POTASSIUM mmol/L 4 8   < > 4 2   CHLORIDE mmol/L 98*   < > 98*   CO2 mmol/L 40*   < > 45* BUN mg/dL 16   < > 16   CREATININE mg/dL 0 97   < > 0 99   CALCIUM mg/dL 9 7   < > 9 5   ALK PHOS U/L  --   --  88   ALT U/L  --   --  26   AST U/L  --   --  16    < > = values in this interval not displayed  Results from last 7 days   Lab Units 03/13/22  2211   INR  1 01     Results from last 7 days   Lab Units 03/13/22  2211   LIPASE u/L 228       Imaging Studies: I have personally reviewed pertinent imaging studies  XR chest 1 view portable    Result Date: 3/14/2022  Impression: No acute cardiopulmonary disease  Workstation performed: [id number]           Patient was seen and examined by Dr Philip Gonzalez  All garza medical decisions were made by Dr Philip Gonzalez  Thank you for allowing us to participate in the care of this present patient  We will follow-up with you closely

## 2022-03-14 NOTE — ASSESSMENT & PLAN NOTE
· Presentation: Patient presents with complaints of dull, aching generalized abdominal pain for 2 days  He denies radiation of the pain  He reports associated symptoms of nausea and vomiting  He denies fever, chills, diarrhea, constipation or urinary symptoms  · Unable to obtain CT imaging/Obstruction series due to large body habitus  · Surgery evaluated patient in ED, it was determined no surgical intervention was needed at this time  · Pain control  · Antiemetics  · GI cocktail  · Clear liquid diet, advance as tolerated  · Consider GI consult if patient clinically worsens

## 2022-03-14 NOTE — CONSULTS
Consultation - General Surgery   Calista Mayers 47 y o  male MRN: [id number]  Unit/Bed#: DAMIÁN Encounter: [id number]    Assessment/Plan     Assessment:  52M with abdominal pain, nausea/vomiting, SOB, chest pain  Plan:  - hernia soft with normal labs  - recommend medicine evaluate for SOB, possible CHF exacerbation  - serial abdominal exams  - advance diet as tolerated      History of Present Illness     HPI:  Calista Mayers is a 47 y o  male who presents with abdominal pain, nausea/vomiting, SOB, chest pain  Patient has chronic abdominal pain related to his ventral hernia, although he says this pain is at the hernia site but worse than usual  He states he has had nausea and vomiting since yesterday  Pain started two days ago  He had a normal bowel movement yesterday and has been passing flatus  He denies diarrhea, blood in stool  His chest pain and SOB is very similar to his last admission for CHF exacerbation  Patient is morbidly obese with BMI 78 and has been seeing [facility] bariatric surgeons for discussion of his hernia  Last time he was seen by their practice was in 9/2021 at which point a proposed procedure had been rejected by his insurance  There was some discussion of involving plastics for Botox and assistance with component separation but patient states the procedure has continued to be denied by insurance  Consults    Review of Systems   Constitutional: Negative for chills and fever  HENT: Negative for ear pain and sore throat  Eyes: Negative for pain and visual disturbance  Respiratory: Positive for chest tightness and shortness of breath  Negative for cough  Cardiovascular: Positive for chest pain  Negative for palpitations  Gastrointestinal: Positive for abdominal pain, nausea and vomiting  Negative for blood in stool, constipation and diarrhea  Genitourinary: Negative for dysuria and hematuria  Musculoskeletal: Negative for arthralgias and back pain     Skin: Negative for color change and rash  Neurological: Negative for seizures and syncope  All other systems reviewed and are negative        Historical Information   Past Medical History:   Diagnosis Date    [facility]     Anemia     Anxiety     Cancer ([facility] )     Cardiac disease     Cellulitis     COPD (chronic obstructive pulmonary disease) ([facility] )     Depression     Diabetes mellitus ([facility] )     DVT (deep venous thrombosis) (HCC)     GERD (gastroesophageal reflux disease)     HBP (high blood pressure)     Heart failure (HCC)     Hx of blood clots     Hypertension     Hypokalemia     Hypothyroid     Obesity     Psychiatric disorder      Past Surgical History:   Procedure Laterality Date    ABDOMINAL HERNIA REPAIR  05/2019    CHOLECYSTECTOMY      Lap    GASTRECTOMY SLEEVE LAPAROSCOPIC  08/2018    INCISION AND DRAINAGE OF WOUND Bilateral 12/1/2021    Procedure: INCISION AND DRAINAGE (I&D) HEAD/FACE;  Surgeon: Destiny Cohen MD;  Location:  MAIN OR;  Service: General    KNEE SURGERY Right     open wound, injury     LEG SURGERY Right 2009    US GUIDED VASCULAR ACCESS  8/6/2018     Social History   Social History     Substance and Sexual Activity   Alcohol Use Not Currently     Social History     Substance and Sexual Activity   Drug Use No     E-Cigarette/Vaping    E-Cigarette Use Never User      E-Cigarette/Vaping Substances    Nicotine No     THC No     CBD No     Flavoring No     Other No     Unknown No      Social History     Tobacco Use   Smoking Status Former Smoker    Packs/day: 1 00    Years: 15 00    Pack years: 15 00   Smokeless Tobacco Never Used   Tobacco Comment    1 5ppd x 23 years, quit 2014     Family History: non-contributory    Meds/Allergies   all current active meds have been reviewed  Allergies   Allergen Reactions    Penicillins Hives       Objective   First Vitals:   Blood Pressure: 155/60 (03/13/22 2131)  Pulse: 87 (03/13/22 2131)  Temperature: 97 8 °F (36 6 °C) (03/13/22 2131)  Temp Source: Oral (03/13/22 2131)  Respirations: 19 (03/13/22 2131)  Height: 5' 10" (177 8 cm) (03/13/22 2130)  Weight - Scale: (!) 248 kg (546 lb 4 8 oz) (03/13/22 2130)  SpO2: 92 % (03/13/22 2131)    Current Vitals:   Blood Pressure: 140/62 (03/14/22 0230)  Pulse: 96 (03/14/22 0230)  Temperature: 97 8 °F (36 6 °C) (03/13/22 2131)  Temp Source: Oral (03/13/22 2131)  Respirations: 20 (03/14/22 0230)  Height: 5' 10" (177 8 cm) (03/13/22 2130)  Weight - Scale: (!) 248 kg (546 lb 4 8 oz) (03/13/22 2130)  SpO2: 92 % (03/14/22 0230)      Intake/Output Summary (Last 24 hours) at 3/14/2022 0303  Last data filed at 3/14/2022 0017  Gross per 24 hour   Intake 1000 ml   Output --   Net 1000 ml       Invasive Devices  Report    Peripheral Intravenous Line            Peripheral IV 03/13/22 Left;Ventral (anterior); Lateral Forearm <1 day                Physical Exam  Vitals and nursing note reviewed  Constitutional:       General: He is not in acute distress  Appearance: He is well-developed  He is obese  He is not ill-appearing  HENT:      Head: Normocephalic and atraumatic  Mouth/Throat:      Mouth: Mucous membranes are moist    Eyes:      Conjunctiva/sclera: Conjunctivae normal    Cardiovascular:      Rate and Rhythm: Normal rate and regular rhythm  Heart sounds: No murmur heard  Pulmonary:      Effort: Pulmonary effort is normal  No respiratory distress  Breath sounds: Normal breath sounds  Comments: 4LNC  Abdominal:      General: There is no distension  Palpations: Abdomen is soft  Tenderness: There is abdominal tenderness (mild tenderness over hernia which is soft)  There is no guarding or rebound  Hernia: No hernia is present  Musculoskeletal:         General: No swelling or tenderness  Cervical back: Neck supple  Skin:     General: Skin is warm and dry  Capillary Refill: Capillary refill takes less than 2 seconds  Neurological:      General: No focal deficit present  Mental Status: He is alert and oriented to person, place, and time  Cranial Nerves: No cranial nerve deficit  Psychiatric:         Mood and Affect: Mood normal          Lab Results:   CBC:   Lab Results   Component Value Date    WBC 9 41 03/13/2022    HGB 13 0 03/13/2022    HCT 39 9 03/13/2022    MCV 92 03/13/2022     03/13/2022    MCH 29 9 03/13/2022    MCHC 32 6 03/13/2022    RDW 14 7 03/13/2022    MPV 10 1 03/13/2022    NRBC 0 03/13/2022   , CMP:   Lab Results   Component Value Date    SODIUM 143 03/13/2022    K 4 2 03/13/2022    CL 98 (L) 03/13/2022    CO2 45 (H) 03/13/2022    BUN 16 03/13/2022    CREATININE 0 99 03/13/2022    CALCIUM 9 5 03/13/2022    AST 16 03/13/2022    ALT 26 03/13/2022    ALKPHOS 88 03/13/2022    EGFR 87 03/13/2022     Imaging: I have personally reviewed pertinent reports  XR chest 1 view portable  Narrative: CHEST     INDICATION:   chest pain  Patient has suspected COVID-19  COMPARISON:  Chest radiograph from 11/7/2021 and chest CT from 5/10/2021  EXAM PERFORMED/VIEWS:  XR CHEST PORTABLE    FINDINGS:    Mild cardiomegaly  The lungs are clear  No pneumothorax or pleural effusion  Osseous structures appear within normal limits for patient age  Impression: Compromised by body habitus and suboptimal inspiration with no definite acute pulmonary disease  Workstation performed: [id number]      EKG, Pathology, and Other Studies: I have personally reviewed pertinent reports  Counseling / Coordination of Care  Total floor / unit time spent today 30 minutes  Greater than 50% of total time was spent with the patient and / or family counseling and / or coordination of care  A description of the counseling / coordination of care: discussion with ED colleagues

## 2022-03-15 ENCOUNTER — APPOINTMENT (OUTPATIENT)
Dept: GASTROENTEROLOGY | Facility: HOSPITAL | Age: 52
DRG: 243 | End: 2022-03-15
Payer: COMMERCIAL

## 2022-03-15 ENCOUNTER — ANESTHESIA EVENT (OUTPATIENT)
Dept: GASTROENTEROLOGY | Facility: HOSPITAL | Age: 52
DRG: 243 | End: 2022-03-15
Payer: COMMERCIAL

## 2022-03-15 ENCOUNTER — ANESTHESIA (OUTPATIENT)
Dept: GASTROENTEROLOGY | Facility: HOSPITAL | Age: 52
DRG: 243 | End: 2022-03-15
Payer: COMMERCIAL

## 2022-03-15 LAB
ANION GAP SERPL CALCULATED.3IONS-SCNC: 2 MMOL/L (ref 4–13)
ATRIAL RATE: 87 BPM
BASOPHILS # BLD AUTO: 0.04 THOUSANDS/ΜL (ref 0–0.1)
BASOPHILS NFR BLD AUTO: 1 % (ref 0–1)
BUN SERPL-MCNC: 17 MG/DL (ref 5–25)
CALCIUM SERPL-MCNC: 9.2 MG/DL (ref 8.3–10.1)
CHLORIDE SERPL-SCNC: 102 MMOL/L (ref 100–108)
CO2 SERPL-SCNC: 43 MMOL/L (ref 21–32)
CREAT SERPL-MCNC: 0.92 MG/DL (ref 0.6–1.3)
EOSINOPHIL # BLD AUTO: 0.03 THOUSAND/ΜL (ref 0–0.61)
EOSINOPHIL NFR BLD AUTO: 0 % (ref 0–6)
ERYTHROCYTE [DISTWIDTH] IN BLOOD BY AUTOMATED COUNT: 14.7 % (ref 11.6–15.1)
GFR SERPL CREATININE-BSD FRML MDRD: 95 ML/MIN/1.73SQ M
GLUCOSE P FAST SERPL-MCNC: 134 MG/DL (ref 65–99)
GLUCOSE SERPL-MCNC: 134 MG/DL (ref 65–140)
HCT VFR BLD AUTO: 40.8 % (ref 36.5–49.3)
HCT VFR BLD AUTO: 42.3 % (ref 36.5–49.3)
HGB BLD-MCNC: 12.7 G/DL (ref 12–17)
HGB BLD-MCNC: 13.2 G/DL (ref 12–17)
IMM GRANULOCYTES # BLD AUTO: 0.04 THOUSAND/UL (ref 0–0.2)
IMM GRANULOCYTES NFR BLD AUTO: 1 % (ref 0–2)
LYMPHOCYTES # BLD AUTO: 0.6 THOUSANDS/ΜL (ref 0.6–4.47)
LYMPHOCYTES NFR BLD AUTO: 7 % (ref 14–44)
MCH RBC QN AUTO: 29.4 PG (ref 26.8–34.3)
MCHC RBC AUTO-ENTMCNC: 31.2 G/DL (ref 31.4–37.4)
MCV RBC AUTO: 94 FL (ref 82–98)
MONOCYTES # BLD AUTO: 0.59 THOUSAND/ΜL (ref 0.17–1.22)
MONOCYTES NFR BLD AUTO: 7 % (ref 4–12)
NEUTROPHILS # BLD AUTO: 7.43 THOUSANDS/ΜL (ref 1.85–7.62)
NEUTS SEG NFR BLD AUTO: 84 % (ref 43–75)
NRBC BLD AUTO-RTO: 0 /100 WBCS
PLATELET # BLD AUTO: 180 THOUSANDS/UL (ref 149–390)
PMV BLD AUTO: 10.4 FL (ref 8.9–12.7)
POTASSIUM SERPL-SCNC: 4 MMOL/L (ref 3.5–5.3)
PR INTERVAL: 0 MS
QRS AXIS: 22 DEGREES
QRSD INTERVAL: 92 MS
QT INTERVAL: 344 MS
QTC INTERVAL: 417 MS
RBC # BLD AUTO: 4.49 MILLION/UL (ref 3.88–5.62)
SODIUM SERPL-SCNC: 147 MMOL/L (ref 136–145)
T WAVE AXIS: -49 DEGREES
VENTRICULAR RATE: 88 BPM
WBC # BLD AUTO: 8.73 THOUSAND/UL (ref 4.31–10.16)

## 2022-03-15 PROCEDURE — 85018 HEMOGLOBIN: CPT | Performed by: INTERNAL MEDICINE

## 2022-03-15 PROCEDURE — 93010 ELECTROCARDIOGRAM REPORT: CPT | Performed by: INTERNAL MEDICINE

## 2022-03-15 PROCEDURE — 85014 HEMATOCRIT: CPT | Performed by: INTERNAL MEDICINE

## 2022-03-15 PROCEDURE — 94760 N-INVAS EAR/PLS OXIMETRY 1: CPT

## 2022-03-15 PROCEDURE — 99232 SBSQ HOSP IP/OBS MODERATE 35: CPT | Performed by: STUDENT IN AN ORGANIZED HEALTH CARE EDUCATION/TRAINING PROGRAM

## 2022-03-15 PROCEDURE — 85025 COMPLETE CBC W/AUTO DIFF WBC: CPT | Performed by: INTERNAL MEDICINE

## 2022-03-15 PROCEDURE — 80048 BASIC METABOLIC PNL TOTAL CA: CPT | Performed by: INTERNAL MEDICINE

## 2022-03-15 PROCEDURE — 99232 SBSQ HOSP IP/OBS MODERATE 35: CPT | Performed by: HOSPITALIST

## 2022-03-15 PROCEDURE — C9113 INJ PANTOPRAZOLE SODIUM, VIA: HCPCS | Performed by: INTERNAL MEDICINE

## 2022-03-15 PROCEDURE — 94640 AIRWAY INHALATION TREATMENT: CPT

## 2022-03-15 PROCEDURE — 99213 OFFICE O/P EST LOW 20 MIN: CPT | Performed by: NURSE PRACTITIONER

## 2022-03-15 PROCEDURE — 0DJ08ZZ INSPECTION OF UPPER INTESTINAL TRACT, VIA NATURAL OR ARTIFICIAL OPENING ENDOSCOPIC: ICD-10-PCS | Performed by: INTERNAL MEDICINE

## 2022-03-15 PROCEDURE — 43235 EGD DIAGNOSTIC BRUSH WASH: CPT | Performed by: INTERNAL MEDICINE

## 2022-03-15 RX ORDER — DABIGATRAN ETEXILATE 150 MG/1
150 CAPSULE, COATED PELLETS ORAL 2 TIMES DAILY
Status: CANCELLED | OUTPATIENT
Start: 2022-03-15

## 2022-03-15 RX ORDER — DABIGATRAN ETEXILATE 150 MG/1
150 CAPSULE, COATED PELLETS ORAL EVERY 12 HOURS SCHEDULED
Status: DISCONTINUED | OUTPATIENT
Start: 2022-03-15 | End: 2022-03-18 | Stop reason: HOSPADM

## 2022-03-15 RX ORDER — LIDOCAINE HYDROCHLORIDE 10 MG/ML
INJECTION, SOLUTION EPIDURAL; INFILTRATION; INTRACAUDAL; PERINEURAL AS NEEDED
Status: DISCONTINUED | OUTPATIENT
Start: 2022-03-15 | End: 2022-03-15

## 2022-03-15 RX ORDER — GLYCOPYRROLATE 0.2 MG/ML
INJECTION INTRAMUSCULAR; INTRAVENOUS AS NEEDED
Status: DISCONTINUED | OUTPATIENT
Start: 2022-03-15 | End: 2022-03-15

## 2022-03-15 RX ORDER — ALBUTEROL SULFATE 2.5 MG/3ML
2.5 SOLUTION RESPIRATORY (INHALATION) EVERY 6 HOURS PRN
Status: DISCONTINUED | OUTPATIENT
Start: 2022-03-15 | End: 2022-03-18 | Stop reason: HOSPADM

## 2022-03-15 RX ORDER — KETAMINE HYDROCHLORIDE 50 MG/ML
INJECTION, SOLUTION, CONCENTRATE INTRAMUSCULAR; INTRAVENOUS AS NEEDED
Status: DISCONTINUED | OUTPATIENT
Start: 2022-03-15 | End: 2022-03-15

## 2022-03-15 RX ORDER — MIDAZOLAM HYDROCHLORIDE 2 MG/2ML
INJECTION, SOLUTION INTRAMUSCULAR; INTRAVENOUS AS NEEDED
Status: DISCONTINUED | OUTPATIENT
Start: 2022-03-15 | End: 2022-03-15

## 2022-03-15 RX ORDER — SODIUM CHLORIDE, SODIUM LACTATE, POTASSIUM CHLORIDE, CALCIUM CHLORIDE 600; 310; 30; 20 MG/100ML; MG/100ML; MG/100ML; MG/100ML
INJECTION, SOLUTION INTRAVENOUS CONTINUOUS PRN
Status: DISCONTINUED | OUTPATIENT
Start: 2022-03-15 | End: 2022-03-15

## 2022-03-15 RX ADMIN — Medication: at 17:11

## 2022-03-15 RX ADMIN — KETAMINE HYDROCHLORIDE 50 MG: 50 INJECTION INTRAMUSCULAR; INTRAVENOUS at 15:28

## 2022-03-15 RX ADMIN — DICYCLOMINE HYDROCHLORIDE 20 MG: 20 TABLET ORAL at 13:09

## 2022-03-15 RX ADMIN — SPIRONOLACTONE 50 MG: 25 TABLET ORAL at 09:01

## 2022-03-15 RX ADMIN — OXYCODONE HYDROCHLORIDE 5 MG: 5 TABLET ORAL at 01:00

## 2022-03-15 RX ADMIN — DABIGATRAN ETEXILATE MESYLATE 150 MG: 150 CAPSULE ORAL at 21:14

## 2022-03-15 RX ADMIN — Medication: at 09:02

## 2022-03-15 RX ADMIN — HYDROMORPHONE HYDROCHLORIDE 0.5 MG: 1 INJECTION, SOLUTION INTRAMUSCULAR; INTRAVENOUS; SUBCUTANEOUS at 09:15

## 2022-03-15 RX ADMIN — FAMOTIDINE 20 MG: 10 INJECTION INTRAVENOUS at 09:15

## 2022-03-15 RX ADMIN — OXYCODONE HYDROCHLORIDE 5 MG: 5 TABLET ORAL at 07:13

## 2022-03-15 RX ADMIN — ALBUTEROL SULFATE 2.5 MG: 2.5 SOLUTION RESPIRATORY (INHALATION) at 19:52

## 2022-03-15 RX ADMIN — BUPROPION HYDROCHLORIDE 450 MG: 150 TABLET, FILM COATED, EXTENDED RELEASE ORAL at 09:01

## 2022-03-15 RX ADMIN — NYSTATIN: 100000 CREAM TOPICAL at 17:11

## 2022-03-15 RX ADMIN — DICYCLOMINE HYDROCHLORIDE 20 MG: 20 TABLET ORAL at 09:03

## 2022-03-15 RX ADMIN — SODIUM CHLORIDE, SODIUM LACTATE, POTASSIUM CHLORIDE, AND CALCIUM CHLORIDE: .6; .31; .03; .02 INJECTION, SOLUTION INTRAVENOUS at 15:24

## 2022-03-15 RX ADMIN — METOPROLOL TARTRATE 50 MG: 50 TABLET, FILM COATED ORAL at 21:14

## 2022-03-15 RX ADMIN — HYDROMORPHONE HYDROCHLORIDE 0.5 MG: 1 INJECTION, SOLUTION INTRAMUSCULAR; INTRAVENOUS; SUBCUTANEOUS at 14:45

## 2022-03-15 RX ADMIN — LEVOTHYROXINE SODIUM 25 MCG: 25 TABLET ORAL at 05:10

## 2022-03-15 RX ADMIN — SODIUM CHLORIDE 8 MG/HR: 9 INJECTION, SOLUTION INTRAVENOUS at 13:09

## 2022-03-15 RX ADMIN — Medication 2000 UNITS: at 09:01

## 2022-03-15 RX ADMIN — DICYCLOMINE HYDROCHLORIDE 20 MG: 20 TABLET ORAL at 21:14

## 2022-03-15 RX ADMIN — OXYCODONE HYDROCHLORIDE 5 MG: 5 TABLET ORAL at 13:08

## 2022-03-15 RX ADMIN — BUDESONIDE AND FORMOTEROL FUMARATE DIHYDRATE 2 PUFF: 160; 4.5 AEROSOL RESPIRATORY (INHALATION) at 09:02

## 2022-03-15 RX ADMIN — METOPROLOL TARTRATE 50 MG: 50 TABLET, FILM COATED ORAL at 09:01

## 2022-03-15 RX ADMIN — HYDROMORPHONE HYDROCHLORIDE 0.5 MG: 1 INJECTION, SOLUTION INTRAMUSCULAR; INTRAVENOUS; SUBCUTANEOUS at 23:11

## 2022-03-15 RX ADMIN — OXYCODONE HYDROCHLORIDE 5 MG: 5 TABLET ORAL at 21:12

## 2022-03-15 RX ADMIN — FUROSEMIDE 60 MG: 10 INJECTION, SOLUTION INTRAMUSCULAR; INTRAVENOUS at 17:11

## 2022-03-15 RX ADMIN — FUROSEMIDE 60 MG: 10 INJECTION, SOLUTION INTRAMUSCULAR; INTRAVENOUS at 09:15

## 2022-03-15 RX ADMIN — NYSTATIN: 100000 CREAM TOPICAL at 09:04

## 2022-03-15 RX ADMIN — HYDROMORPHONE HYDROCHLORIDE 0.5 MG: 1 INJECTION, SOLUTION INTRAMUSCULAR; INTRAVENOUS; SUBCUTANEOUS at 03:11

## 2022-03-15 RX ADMIN — BUDESONIDE AND FORMOTEROL FUMARATE DIHYDRATE 2 PUFF: 160; 4.5 AEROSOL RESPIRATORY (INHALATION) at 17:10

## 2022-03-15 RX ADMIN — GLYCOPYRROLATE 0.2 MG: 0.2 INJECTION, SOLUTION INTRAMUSCULAR; INTRAVENOUS at 15:28

## 2022-03-15 RX ADMIN — SODIUM CHLORIDE 8 MG/HR: 9 INJECTION, SOLUTION INTRAVENOUS at 03:24

## 2022-03-15 RX ADMIN — LIDOCAINE HYDROCHLORIDE 100 MG: 10 INJECTION, SOLUTION EPIDURAL; INFILTRATION; INTRACAUDAL at 15:28

## 2022-03-15 RX ADMIN — MIDAZOLAM HYDROCHLORIDE 2 MG: 1 INJECTION, SOLUTION INTRAMUSCULAR; INTRAVENOUS at 15:28

## 2022-03-15 NOTE — UTILIZATION REVIEW
Initial Clinical Review  OBSERVATION  3/14/22 @0153 CONVERTED TO INPATIENT ADMISSION 3/16/22 @1432 DUE TO CONTINUED STAY REQUIRED TO EVALUATE AND TREAT PATIENT WITH ABDOMINAL PAIN,ESOPHAGITIS,  SUSPECTED FOOD BEZOAR AND ACUTE ON CHRONIC CHF WITH IV PPI DRIP,REGLAN, IV  DIURESIS , CONTINUED MONITORING  CARDIOLOGY AND GI FOLLOWING  Admission: Date/Time/Statement:   Admission Orders (From admission, onward)     Ordered        03/16/22 1432  Inpatient Admission  Once            03/14/22 0153  Place in Observation  Once                      Orders Placed This Encounter   Procedures    Inpatient Admission     Standing Status:   Standing     Number of Occurrences:   1     Order Specific Question:   Level of Care     Answer:   Med Surg [16]     Order Specific Question:   Estimated length of stay     Answer:   More than 2 Midnights     Order Specific Question:   Certification     Answer:   I certify that inpatient services are medically necessary for this patient for a duration of greater than two midnights  See H&P and MD Progress Notes for additional information about the patient's course of treatment  ED Arrival Information     Expected Arrival Acuity    - 3/13/2022 21:24 Urgent         Means of arrival Escorted by Service Admission type    [facility]ulance [facility] Hospitalist Urgent         Arrival complaint    abdominal pain        Chief Complaint   Patient presents with    Abdominal Pain     Pt complains of center abdominal pain for the last couple of days  Initial Presentation: 47 y o  male with PMHx of morbid obesity, DCHF, COPD on 2 L O2 home use, Afib, prior DVT on pradaxa, FILIPPO presented with abdominal pain x2 days with  intractable nausea and vomiting  Also reports dyspnea and 20 lb weight gain over the last several months  On exam, pt requiring mor O2 than baseline with sat down to 88%   Pt on 4 L O2 with sat low 90's, has irreg heart rhythm, accessory muscle use with rales bilat mid lobes , generalized abdominal tenderness  Unable to perform CT due to body habitus  Labs- troponin initial 3,   Pt given multiple doses IV analgesic, IVF, Iv antiemetic,IV Lasix, po bentyl in ED  Pt admitted as OBS with intractable abdomonal pain, acute on chronic diastolicCHF, acute on chronic resp failure  Plan - surgery  and cardiology consults, pain control, antiemetics, GI cocktail, CL diet, advance as radhames, trend troponin to peak, IV lasix 60 mg BID, BB, I/O daily wt, supplemental o2      Wt Readings from Last 3 Encounters:   03/13/22 (!) 248 kg (546 lb 4 8 oz)   02/22/22 (!) 242 kg (534 lb)-via frankie lift at facility   02/18/22 (!) 242 kg (534 lb 6 3 oz)     gen'l surgery-Abdomen soft, tenderness over hernia that is soft  No surgical intervention  Serial abdominal exams  Advance diet as tolerated  Cardiology-emesis occasionally black/coffee-ground in appearance  Recommend GI consult  Reports increasing SOB/SHARIF /chest presssure x2-3 days  XR shows possible pulm vasc congestion  Agree with IV lasix 60 mg BID, assess response,strict I/O , daily wt, 2 Gm na diet w/ 1800 ml FR, monitor renal function , lytes, keep K >4, mag >2 nedds lifestyle /diet modification, wt loss  GI-Possibly peptic ulcer disease versus gastritis versus Stefany-Fernandez tear  Plan - continue PPI, NPO after MN for EGD 3/15  Continue bentyl  Date:3/15   Day 2:   Per general surgery, pending results of EGD today would advance diet as tolerated  Pt having mid abdominal pain, no N/V  Hgb stable 13 2  On IV PPI drip  Per cardiology, continue IV diuretics-60 mg IV lasix BID   AC on hold for EGD today  Pt feels more comfortable lying flat today  Currently requiring 4 L of supplemental oxygen sats in the low to mid 90s  Normal lung sounds, distant heart tones  BLE edema  I/O = -650 ml , likely inaccurate  Wt 565 lbs today, unable to stand for wt, using bed scale      Venous duplex study 3/14/2022; no evidence of acute or chronic DVT in the right or left lower limb  [email or web address]  GI- EGD with IV anesthesia   IMPRESSION:  Mid and distal esophagitis, scope was advanced to the gastric cardia where large amount of food was retained  The procedure was aborted at that point due to risk of aspiration  There was a copious amount of food up to his gastric cardia  Suspect food bezoar   GI recommends- Reglan , CL diet, check UGI series  Pt advanced to CL then NPO effective 3/16 @0001  3/15 FOREIGN BODY , STOMACH     Date 3/16  Cardiology- Difficult to obtain weights due to body habitus and inability to stand  Wt 565 04 lbs today   Breathing slowly improving  I/O = -660 ml last 24 hrs, overall -1 2 L  Breath sounds diminished bilaterally, BLE edema   Continue metoprolol tartrate and resume anticoagulation when cleared by GI  Continue furosemide 60 mg IV b i d  Renal function stable   Wound care consult -  intertriginous dermatitis (ITD) in groin folds  fungal smell per staff  Currently Nystatin cream being used  Recommend stop nystatin, start Interdry wicking fabric that is impregnated with silver  Interdry will wick fabric from the skin fold and the silver will treat topically the cutaneous fungal infection  GI-Suspecting food bezoar  Pt on liquid diet and reglan started , post EGD yesterday  - Continue reglan and CL diet  Abdominal exams  Due to body habitus, further imaging including UGI series unable to be obtained  Reached out to [facility] with bariatric CT scan but pts hip measurement >80 cm - unable to use this CT then  Only other option would be a scanner out of network   Medicine- monitor for tardive dyskinesia with scheduled reglan  Pt continues to report abdominal pain, per nursing notes 6-7/10  Pt receiving prn IV and prn po narcotic anagesics for pain No recent bm  Pt continues PPI drip  Pt on O2 @4 L,with sats low to mid 90's -home use 2 L O2  OK to resume AC/Pradaxa per GI      Date 3/17 day 2   PPI  drip d/blanka today And pt started on po PPI  Cardiology- pt subjectively 75 % improved with his breathing, not at baseline  I/O = -2 3 L overall, -1 1 L last 24 hrs  Lungs diminished bilaterally, has BLE edema  Wt 256 kg =564 6 lbs  Continue IV lasix 60 mg BID  GI-Pt reports GI symptoms improved, has nausea but no vomiting, passing gas, no bm  Tolerating liquids  + abdominal hernias noted  Reports the same tenderness to palpation in the upper abdomen  Bowel sounds present  LLQ abdom pain 6/10 this am per nursing   Josep Gridley Plan - continue CL diet, Added miralax and stool softeners to aid in bm's  Continue  Reglan      Continue abdominal exams  If patient gets readmitted in the future would recommend he be referred to a facility that is capable of performing imaging on him      ED Triage Vitals [03/13/22 2131]   Temperature Pulse Respirations Blood Pressure SpO2   97 8 °F (36 6 °C) 87 19 155/60 92 %      Temp Source Heart Rate Source Patient Position - Orthostatic VS BP Location FiO2 (%)   Oral Monitor Lying Right arm --      Pain Score       8          Wt Readings from Last 1 Encounters:   03/17/22 (!) 256 kg (564 lb 9 6 oz)     Additional Vital Signs:   Date/Time Temp Pulse Resp BP MAP (mmHg) SpO2 Calculated FIO2 (%) - Nasal Cannula Nasal Cannula O2 Flow Rate (L/min) O2 Device   03/17/22 07:41:36 98 2 °F (36 8 °C) 112 Abnormal  20 132/76 95 94 % -- -- --   03/17/22 07:41:17 98 2 °F (36 8 °C) 111 Abnormal  18 132/76 95 94 % -- -- --   03/16/22 21:51:06 98 3 °F (36 8 °C) 107 Abnormal  20 116/82 93 91 % -- -- --   03/16/22 16:06:45 98 9 °F (37 2 °C) 116 Abnormal  18 117/71 86 89 % Abnormal  36 4 L/min Nasal cannula   03/16/22 16:06:33 98 9 °F (37 2 °C) 123 Abnormal  -- 117/71 86 89 % Abnormal  -- -- --       Date/Time Temp Pulse Resp BP MAP (mmHg) SpO2 Calculated FIO2 (%) - Nasal Cannula Nasal Cannula O2 Flow Rate (L/min) O2 Device   03/16/22 06:23:58 97 6 °F (36 4 °C) 104 19 110/61 77 91 % -- -- --   03/15/22 20:43:45 97 8 °F (36 6 °C) 102 20 129/65 86 91 % 36 4 L/min Nasal cannula   03/15/22 1954 -- -- -- -- -- 90 % 34 3 5 L/min Nasal cannula   03/15/22 1626 -- 119 Abnormal  20 137/63 -- 95 % 36 4 L/min EtCO2 mask   03/15/22 1616 -- 124 Abnormal  20 126/64 -- 95 % 36 4 L/min EtCO2 mask   03/15/22 1606 -- 117 Abnormal  20 120/75 -- 92 % 36 4 L/min EtCO2 mask   03/15/22 1556 -- 113 Abnormal  20 130/82 -- 92 % 36 4 L/min EtCO2 mask   03/15/22 1541 98 °F (36 7 °C) 115 Abnormal  20 134/93 -- 95 % 32 3 L/min EtCO2 mask   03/15/22 15:35:37 -- 113 Abnormal  -- -- -- 98 % -- -- --   03/15/22 15:34:36 -- 105 -- -- -- 97 % -- -- --   03/15/22 15:33:37 -- 104 -- -- -- 96 % -- -- --   03/15/22 15:32:37 -- 108 Abnormal  -- -- -- 96 % -- -- --   03/15/22 15:31:37 -- 94 -- -- -- 95 % -- -- --   03/15/22 15:30:37 -- 108 Abnormal  -- -- -- 90 % -- -- --   03/15/22 15:29:36 -- 103 -- -- -- 90 % -- -- --   03/15/22 15:28:36 -- 101 -- -- -- 99 % -- -- --   03/15/22 15:27:37 -- 102 -- -- -- 99 % -- -- --   03/15/22 15:26:37 -- 101 -- -- -- 98 % -- -- --   03/15/22 15:25:37 -- 98 -- -- -- 94 % -- -- --   03/15/22 1510 97 1 °F (36 2 °C) Abnormal  99 18 112/55 -- 93 % 36 4 L/min Nasal cannula   03/15/22 08:19:19 -- 104 16 124/70 88 90 % -- -- --       Date/Time Temp Pulse Resp BP MAP (mmHg) SpO2 Calculated FIO2 (%) - Nasal Cannula Nasal Cannula O2 Flow Rate (L/min) O2 Device O2 Interface Device   03/14/22 2235 -- -- -- -- -- 94 % 36 4 L/min Nasal cannula --   03/14/22 2100 -- 100 -- 130/80 -- -- -- -- -- --   03/14/22 16:41:50 97 3 °F (36 3 °C) Abnormal  84 16 123/73 90 91 % -- -- -- --   03/14/22 0821 -- -- -- -- -- 92 % -- -- -- --   03/14/22 08:17:32 97 5 °F (36 4 °C) 101 18 125/65 85 84 % Abnormal  -- -- -- --   03/14/22 0426 -- -- -- -- -- 93 % -- -- -- Face mask   03/14/22 03:14:37 97 2 °F (36 2 °C) Abnormal  91 18 122/71 88 88 % Abnormal  -- -- -- --   03/14/22 0230 -- 96 20 140/62 86 92 % 36 4 L/min Nasal cannula --   03/14/22 0200 -- 92 21 147/67 97 92 % -- -- -- --   03/14/22 0000 -- 86 20 168/72 104 92 % 36 4 L/min Nasal cannula --   03/13/22 2300 -- 84 20 160/76 109 93 % 36 4 L/min Nasal cannula --   03/13/22 2230 -- 90 20 149/63 91 92 % 36 4 L/min Nasal cannula --       Pertinent Labs/Diagnostic Test Results:   VAS lower limb venous duplex study, complete bilateral   RIGHT LOWER LIMB:  No evidence of acute or chronic deep vein thrombosis  No evidence of superficial thrombophlebitis noted  Doppler evaluation shows a normal response to augmentation maneuvers  Popliteal, posterior tibial and anterior tibial arterial Doppler waveforms are  triphasic  LEFT LOWER LIMB:  No evidence of acute or chronic deep vein thrombosis  No evidence of superficial thrombophlebitis noted  Doppler evaluation shows a normal response to augmentation maneuvers  Popliteal, posterior tibial and anterior tibial arterial Doppler waveforms are  triphasic  Technically difficult/limited study to body habitus, pitting edema and poor  visualization of calf veins  XR chest 1 view portable   ED Interpretation by Baljinder Montano PA-C (03/13 9894)   Mild cardiomegaly on initial read      Final Result by Hollace Bernheim, MD (03/14 6036)      No acute cardiopulmonary disease     3/13 ECG-ED  Rate:     ECG rate:  88     ECG rate assessment: normal     Rhythm:     Rhythm: sinus rhythm     Ectopy:     Ectopy: none     QRS:     QRS axis:  Normal     QRS intervals:  Normal   Conduction:     Conduction: normal     ST segments:     ST segments:  Normal        Results from last 7 days   Lab Units 03/14/22  0353   SARS-COV-2  Negative     Results from last 7 days   Lab Units 03/16/22  0529 03/15/22  1109 03/15/22  0612 03/14/22  2120 03/14/22  0624 03/13/22  2211 03/13/22  2211   WBC Thousand/uL 5 15  --  8 73  --  11 37*   < > 9 41   HEMOGLOBIN g/dL 12 3 12 7 13 2 13 5 14 2   < > 13 0   HEMATOCRIT % 39 9 40 8 42 3 42 0 45 7   < > 39 9   PLATELETS Thousands/uL 178  --  180  --  202   < > 175 NEUTROS ABS Thousands/µL  --   --  7 43  --  10 24*  --  7 80*    < > = values in this interval not displayed           Results from last 7 days   Lab Units 03/17/22  0528 03/16/22  0529 03/15/22  0612 03/14/22  0624 03/13/22 2211   SODIUM mmol/L 140 146* 147* 143 143   POTASSIUM mmol/L 3 5 3 9 4 0 4 8 4 2   CHLORIDE mmol/L 94* 98* 102 98* 98*   CO2 mmol/L 44* >45* 43* 40* 45*   ANION GAP mmol/L 2*  --  2* 5 0*   BUN mg/dL 16 19 17 16 16   CREATININE mg/dL 0 81 0 92 0 92 0 97 0 99   EGFR ml/min/1 73sq m 102 95 95 90 87   CALCIUM mg/dL 9 0 9 2 9 2 9 7 9 5   MAGNESIUM mg/dL 2 1  --   --   --   --      Results from last 7 days   Lab Units 03/13/22 2211   AST U/L 16   ALT U/L 26   ALK PHOS U/L 88   TOTAL PROTEIN g/dL 7 7   ALBUMIN g/dL 3 2*   TOTAL BILIRUBIN mg/dL 0 69     Results from last 7 days   Lab Units 03/14/22  1639   POC GLUCOSE mg/dl 156*     Results from last 7 days   Lab Units 03/17/22  0528 03/16/22  0529 03/15/22  0612 03/14/22  0624 03/13/22 2211   GLUCOSE RANDOM mg/dL 114 125 134 174* 165*              Results from last 7 days   Lab Units 03/13/22  2304   PH TRACI  7 330   PCO2 TRACI mm Hg 79 6*   PO2 TRACI mm Hg 130 5*   HCO3 TRACI mmol/L 41 0*   BASE EXC TRACI mmol/L 11 6   O2 CONTENT TRACI ml/dL 18 4   O2 HGB, VENOUS % 94 7*             Results from last 7 days   Lab Units 03/13/22  2211   HS TNI 0HR ng/L 3         Results from last 7 days   Lab Units 03/13/22  2211   PROTIME seconds 13 3   INR  1 01   PTT seconds 32             Results from last 7 days   Lab Units 03/13/22  2211   LACTIC ACID mmol/L 0 7             Results from last 7 days   Lab Units 03/13/22  2211   NT-PRO BNP pg/mL 752*             Results from last 7 days   Lab Units 03/13/22  2211   LIPASE u/L 228                     Results from last 7 days   Lab Units 03/14/22  0353   INFLUENZA A PCR  Negative   INFLUENZA B PCR  Negative   RSV PCR  Negative             ED Treatment:   Medication Administration from 03/13/2022 3373 to 03/14/2022 0302 Date/Time Order Dose Route Action     03/13/2022 2212 sodium chloride 0 9 % bolus 1,000 mL 1,000 mL Intravenous New Bag     03/13/2022 2217 HYDROmorphone (DILAUDID) injection 1 mg 1 mg Intravenous Given     03/13/2022 2214 ondansetron (ZOFRAN) injection 4 mg 4 mg Intravenous Given     03/13/2022 2215 famotidine (PEPCID) injection 20 mg 20 mg Intravenous Given     03/14/2022 0023 HYDROmorphone (DILAUDID) injection 0 5 mg 0 5 mg Intravenous Given     03/14/2022 0118 furosemide (LASIX) injection 40 mg 40 mg Intravenous Given     03/14/2022 0154 HYDROmorphone (DILAUDID) injection 0 5 mg 0 5 mg Intravenous Given     03/14/2022 0154 dicyclomine (BENTYL) tablet 20 mg 20 mg Oral Given        Past Medical History:   Diagnosis Date    [facility]     Anemia     Anxiety     Cancer ([facility] )     Cardiac disease     Cellulitis     [facility]ch[facility]     Depression     Diabetes mellitus ([facility] )     [facility] thrombosis) [facility])     GERD (gastroesophageal reflux disease)     HBP (high blood pressure)     Heart failure ([facility] )     Hx of blood clots     Hypertension     Hypokalemia     Hypothyroid     Obesity     Psychiatric disorder      Present on Admission:   Acute on chronic respiratory failure with hypoxia ([facility] )   Morbid obesity ([facility] )   Obesity hypoventilation syndrome/FILIPPO   Acute on chronic diastolic congestive heart failure (HCC)   Atrial fibrillation (HCC)   COPD [facility]chronic obstructive pulmonary disease) ([facility])   Hypothyroidism      Admitting Diagnosis: Umbilical pain [I63 76]  Abdominal pain [R10 9]  Nausea and vomiting [R11 2]  BMI 70 and over, adult [facility]) [Z68 45]  History of abdominal surgery [Z98 890]  Age/Sex: 47 y o  male  Admission Orders:  Scheduled Medications:  ammonium lactate, , Topical, BID  budesonide-formoterol, 2 puff, Inhalation, BID  buPROPion, 450 mg, Oral, Daily  cholecalciferol, 2,000 Units, Oral, Daily  dabigatran etexilate, 150 mg, Oral, Q12H SCHStart: 03/15/22 2100  dicyclomine, 20 mg, Oral, 4x Daily (AC & HS)  famotidine, 20 mg, Intravenous, Q24H MARY  furosemide, 60 mg, Intravenous, BID  levothyroxine, 25 mcg, Oral, Early Morning  metoclopramide, 10 mg, Oral, TID AC Start: 03/16/22 0715  metoprolol tartrate, 50 mg, Oral, Q12H MARY  nystatin, , Topical, BIDEnd: 03/16/22 1027  spironolactone, 50 mg, Oral, Daily    pantoprazole (PROTONIX) injection 40 mg  Dose: 40 mg  Freq: Every 12 hours scheduled Route: IV  Start: 03/14/22 0900 End: 03/14/22 1354  polyethylene glycol (MIRALAX) packet 17 g  Dose: 17 g  Freq: Daily Route: PO  Start: 03/17/22 0915  pantoprazole (PROTONIX) EC tablet 40 mg  Dose: 40 mg  Freq: Daily (early morning) Route: PO  Start: 03/17/22 1015      Continuous IV Infusions:  pantoprozole (PROTONIX) infusion (Continuous), 8 mg/hr, Intravenous, ContinuousEnd: 03/17/22 1014      PRN Meds:  acetaminophen, 650 mg, Oral, Q6H PRN  aluminum-magnesium hydroxide-simethicone, 30 mL, Oral, Q6H PRN  HYDROmorphone, 0 5 mg, Intravenous, Q4H PRN x4 3/14, x4 3/15 x2 3/16 x1 3/17  hydrOXYzine HCL, 25 mg, Oral, TID PRN  ondansetron, 4 mg, Intravenous, Q6H PRN x2 3/14  oxyCODONE, 2 5 mg, Oral, Q6H PRN x1 3/16, x1 3/17  oxyCODONE, 5 mg, Oral, Q6H PRN x3 3/14, x4 3/15 x2 3/16  sodium chloride, 1 spray, Each Nare, Q2H PRN  albuterol inhalation solution 2 5 mg  Dose: 2 5 mg  Freq: Every 6 hours PRN Route: NEBULIZATION x1 3/15    NPO   daily wt   I/O  Up as radhames          IP CONSULT TO ACUTE CARE SURGERY  IP CONSULT TO NUTRITION SERVICES  IP CONSULT TO CARDIOLOGY  IP CONSULT TO GASTROENTEROLOGY    Network Utilization Review Department  ATTENTION: Please call with any questions or concerns to [phone] and carefully listen to the prompts so that you are directed to the right person   All voicemails are confidential   [place] all requests for admission clinical reviews, approved or denied determinations and any other requests to dedicated fax number below belonging to the campus where the patient is receiving treatment   List of dedicated fax numbers for the Facilities:  [address] [place] [address] DENIALS (Administrative/Medical Necessity) [phone]   [address]  (Maternity/NICU/Pediatrics) [phone] [address]  [address] [place] [address] [phone] [address] [facility] [address] [facility] [phone]

## 2022-03-15 NOTE — ANESTHESIA PREPROCEDURE EVALUATION
Procedure:  EGD    Relevant Problems   ANESTHESIA (within normal limits)      CARDIO   (+) Acute on chronic diastolic congestive heart failure (HCC)   (+) Atrial fibrillation (HCC)      ENDO   (+) Hypothyroidism      NEURO/PSYCH   (+) History of DVT (deep vein thrombosis)      PULMONARY   (+) Acute on chronic respiratory failure with hypoxia (HCC)   (+) COPD (chronic obstructive pulmonary disease) (HCC)   (+) Shortness of breath      Other   (+) Intractable abdominal pain   (+) Morbid obesity (HCC)   (+) Obesity hypoventilation syndrome/FILIPPO        Physical Exam    Airway    Mallampati score: III  TM Distance: >3 FB  Neck ROM: limited     Dental   Comment: Chipped teeth  No loose,     Cardiovascular  Rhythm: irregular, Rate: normal,     Pulmonary  Decreased breath sounds,     Other Findings        Anesthesia Plan  ASA Score- 4     Anesthesia Type- IV sedation with anesthesia with ASA Monitors  Additional Monitors:   Airway Plan:           Plan Factors-Exercise tolerance (METS): <4 METS  Chart reviewed  EKG reviewed  Existing labs reviewed  Patient summary reviewed  Induction-     Postoperative Plan-     Informed Consent- Anesthetic plan and risks discussed with patient  I personally reviewed this patient with the CRNA  Discussed and agreed on the Anesthesia Plan with the CRNA  Renetta Bo

## 2022-03-15 NOTE — ASSESSMENT & PLAN NOTE
Wt Readings from Last 3 Encounters:   03/15/22 (!) 256 kg (565 lb)   02/22/22 (!) 242 kg (534 lb)   02/18/22 (!) 242 kg (534 lb 6 3 oz)    Chest x-ray: Large body habitus, unremarkable  Last ECHO on file from July 3177: LV systolic function was normal  Ejection fraction was estimated to be 60%  BNP: 752    Plan  Continue with IV Lasix 60 mg bid    Continue Metoprolol tartrate 50 mg b i d    Monitor intake and output, daily weights  Diet 2 g sodium, 1 5 L restriction - to start after EGD procedure  Cardiology following, recommendations appreciated

## 2022-03-15 NOTE — PROGRESS NOTES
Patient is refusing NIV for the night  [facility]  Anesthesiology Pain Management Progress Note      Patient name: Georgette Rodríguez 40year old female  : 1979  MRN: [id number]    Diagnosis: Cellulitis of right leg  (primary encounter diagnosis)  Hyperglycemia  Hyponatremia

## 2022-03-15 NOTE — PROGRESS NOTES
[facility]  Progress Note - Jayro Pruitt 1970, 47 y o  male MRN: [id number]  Unit/Bed#: W MS Gregor-85 Encounter: [id number]  Primary Care Provider: Maryuri Liriano MD   Date and time admitted to hospital: 3/13/2022  9:24 PM    * Intractable abdominal pain  Assessment & Plan  · Presentation: complaints of dull, aching generalized abdominal pain for 2 days  He denies radiation of the pain  He reports associated symptoms of nausea and vomiting  He denies fever, chills, diarrhea, constipation or urinary symptoms  · Unable to obtain CT imaging/Obstruction series due to large body habitus  · Surgery evaluated patient in ED, it was determined no surgical intervention was needed at this time  Plan  · Pain control  · Zofran  · IV Protonix  · NPO for planned EGD - Restart diet after procedure  · Gastroenterology consulted, appreciate recommendation    Acute on chronic respiratory failure with hypoxia ([facility] [address] )  Assessment & Plan  · Patient wears 2 L of supplemental oxygen chronically/baseline  POA, requiring 4 L with SpO2 92%  · Suspect secondary to CHF exacerbation  · COVID/Influenza/RSV negative  · Patient on 3 5 L O2 NC  Plan  · Wean O2 as tolerated  · On diuresis  · Respiratory protocol  History of DVT (deep vein thrombosis)  Assessment & Plan    · Held Pradaxa - due to EGD, restart afterwards    Hypothyroidism  Assessment & Plan  Plan  · Continue levothyroxine  COPD (chronic obstructive pulmonary disease) (HCC)  Assessment & Plan  · Not in an acute exacerbation  Plan  · Continue home regimen of Symbicort  · Respiratory protocol  Atrial fibrillation (HCC)  Assessment & Plan  Plan  · Rate/Rhythm Control: Metoprolol tartrate    Antiplatelet/Anticoagulation: Pradaxa - currently held due to EGD, to continue after procedure    Acute on chronic diastolic congestive heart failure [facility])  Assessment & Plan  Wt Readings from Last 3 Encounters:   03/15/22 (!) 256 kg (565 lb) 22 (!) 242 kg (534 lb)   22 (!) 242 kg (534 lb 6 3 oz)    Chest x-ray: Large body habitus, unremarkable  Last ECHO on file from 4334: LV systolic function was normal  Ejection fraction was estimated to be 60%  BNP: 752    Plan  Continue with IV Lasix 60 mg bid  Continue Metoprolol tartrate 50 mg b i d    Monitor intake and output, daily weights  Diet 2 g sodium, 1 5 L restriction - to start after EGD procedure  Cardiology following, recommendations appreciated    Obesity hypoventilation syndrome/FILIPPO  Assessment & Plan  · CPAP HS    Morbid obesity ([facility] [address] )  Assessment & Plan  · Encouraged lifestyle modifications  VTE Pharmacologic Prophylaxis:   VTE Score: 4 Moderate Risk (Score 3-4) - Pharmacological DVT Prophylaxis Contraindicated  Sequential Compression Devices Ordered  Held due to EGD procedure  To restart Pradaxa after procedure  Mechanical VTE Prophylaxis in Place: No    Patient Centered Rounds: I have performed bedside rounds with nursing staff today  Discussions with Specialists or Other Care Team Provider:  Cardiology, Gastroenterology, General surgery    Education and Discussions with Family / Patient: Updated  (significant other) via phone  Current Length of Stay: 0 day(s)    Current Patient Status: Observation     Discharge Plan / Estimated Discharge Date: Anticipate discharge in 48-72 hrs to prior assisted or independent living facility  Code Status: Level 1 - Full Code      Subjective:   Nursing team reported no overnight events  Patient reported epigastric and periumbilical pain, nausea without vomiting, and coughing up brownish sputum  Patient reports voiding urine  Patient denied headache, shortness of breath, lower extremity pain      Objective:     Vitals:   Temp (24hrs), Av 5 °F (36 4 °C), Min:97 1 °F (36 2 °C), Max:98 °F (36 7 °C)    Temp:  [97 1 °F (36 2 °C)-98 °F (36 7 °C)] 98 °F (36 7 °C)  HR:  [] (P) 113  Resp:  [16-20] (P) 20  BP: (112-134)/(55-93) (P) 130/82  SpO2:  [90 %-99 %] (P) 92 %  Body mass index is 81 07 kg/m²  Input and Output Summary (last 24 hours): Intake/Output Summary (Last 24 hours) at 3/15/2022 1557  Last data filed at 3/15/2022 1532  Gross per 24 hour   Intake 150 ml   Output 620 ml   Net -470 ml       Physical Exam:     Physical Exam  Vitals and nursing note reviewed  Constitutional:       General: He is not in acute distress  Appearance: He is morbidly obese  He is ill-appearing  He is not diaphoretic  HENT:      Head: Normocephalic and atraumatic  Mouth/Throat:      Mouth: Mucous membranes are moist       Pharynx: Oropharynx is clear  Eyes:      General: No scleral icterus  Conjunctiva/sclera: Conjunctivae normal    Cardiovascular:      Rate and Rhythm: Regular rhythm  Tachycardia present  Pulses: Normal pulses  Heart sounds: No murmur heard  No gallop  Pulmonary:      Effort: Pulmonary effort is normal  No respiratory distress  Breath sounds: Rales ( appreciated mildly at bilateral mid axillary line, difficult to assess due to body habitus) present  No wheezing  Abdominal:      General: Bowel sounds are normal  There is no distension  Palpations: Abdomen is soft  There is no mass  Tenderness: There is abdominal tenderness ( at epigastrium and periumbilical area)  Comments: Unable to appreciate bowel sounds, likely due to body habitus   Musculoskeletal:         General: No tenderness  Normal range of motion  Cervical back: Normal range of motion and neck supple  No tenderness  Right lower leg: No edema  Left lower leg: No edema  Skin:     General: Skin is warm and dry  Capillary Refill: Capillary refill takes less than 2 seconds  Findings: Rash present  Rash is macular (Under skin folds)  Comments: Skin breakage at left inguinal skin fold   Neurological:      General: No focal deficit present        Mental Status: He is alert and oriented to person, place, and time  Mental status is at baseline  Sensory: No sensory deficit  Psychiatric:         Mood and Affect: Mood is anxious  Behavior: Behavior normal          Thought Content: Thought content normal          Judgment: Judgment normal           Additional Data:     Labs:  Results from last 7 days   Lab Units 03/15/22  1109 03/15/22  0612 03/15/22  0612   WBC Thousand/uL  --   --  8 73   HEMOGLOBIN g/dL 12 7   < > 13 2   HEMATOCRIT % 40 8   < > 42 3   PLATELETS Thousands/uL  --   --  180   NEUTROS PCT %  --   --  84*   LYMPHS PCT %  --   --  7*   MONOS PCT %  --   --  7   EOS PCT %  --   --  0    < > = values in this interval not displayed  Results from last 7 days   Lab Units 03/15/22  0612 03/14/22  0624 03/13/22  2211   SODIUM mmol/L 147*   < > 143   POTASSIUM mmol/L 4 0   < > 4 2   CHLORIDE mmol/L 102   < > 98*   CO2 mmol/L 43*   < > 45*   BUN mg/dL 17   < > 16   CREATININE mg/dL 0 92   < > 0 99   ANION GAP mmol/L 2*   < > 0*   CALCIUM mg/dL 9 2   < > 9 5   ALBUMIN g/dL  --   --  3 2*   TOTAL BILIRUBIN mg/dL  --   --  0 69   ALK PHOS U/L  --   --  88   ALT U/L  --   --  26   AST U/L  --   --  16   GLUCOSE RANDOM mg/dL 134   < > 165*    < > = values in this interval not displayed       Results from last 7 days   Lab Units 03/13/22  2211   INR  1 01     Results from last 7 days   Lab Units 03/14/22  1639   POC GLUCOSE mg/dl 156*         Results from last 7 days   Lab Units 03/13/22  2211   LACTIC ACID mmol/L 0 7       Imaging: Reviewed radiology reports from this admission including: chest xray    Recent Cultures (last 7 days):           Lines/Drains:  Invasive Devices  Report    Peripheral Intravenous Line            Peripheral IV 03/15/22 Left;Ventral (anterior) Forearm <1 day                Telemetry:        Last 24 Hours Medication List:   Current Facility-Administered Medications   Medication Dose Route Frequency Provider Last Rate    acetaminophen  650 mg Oral Q6H PRN Auburn Chaparro, CRNP      aluminum-magnesium hydroxide-simethicone  30 mL Oral Q6H PRN Auburn Luyando, CRNP      ammonium lactate   Topical BID Auburn Luyando, CRNP      budesonide-formoterol  2 puff Inhalation BID Auburn Chaparro, CRNP      buPROPion  450 mg Oral Daily [facility], CRNP      cholecalciferol  2,000 Units Oral Daily [facility], CRNP      dicyclomine  20 mg Oral 4x Daily (AC & HS) [facility], CRNP      famotidine  20 mg Intravenous Q24H [facility], CRNP      furosemide  60 mg Intravenous BID [facility], CRNP      HYDROmorphone  0 5 mg Intravenous Q4H PRN [facility], CRNP      hydrOXYzine HCL  25 mg Oral TID PRN [facility], CRNP      levothyroxine  25 mcg Oral Early Morning [facility], CRNP      metoprolol tartrate  50 mg Oral Q12H [facility] Philip, CRNP      nystatin   Topical BID [facility], CRNP      ondansetron  4 mg Intravenous Q6H PRN [facility], CRNP      oxyCODONE  2 5 mg Oral Q6H PRN [facility], CRNP      oxyCODONE  5 mg Oral Q6H PRN [facility], CRNP      pantoprozole (PROTONIX) infusion (Continuous)  8 mg/hr Intravenous Continuous Su Irene MD 8 mg/hr (03/15/22 1309)    sodium chloride  1 spray Each Nare Q2H PRN Auburn Chaparro, CRNP      spironolactone  50 mg Oral Daily Auburn Luyando, CRNP          Today, Patient Was Seen By: Agueda Starks MD    ** Please Note: This note has been constructed using a voice recognition system   **

## 2022-03-15 NOTE — ASSESSMENT & PLAN NOTE
Plan  · Rate/Rhythm Control: Metoprolol tartrate    Antiplatelet/Anticoagulation: Pradaxa - currently held due to EGD, to continue after procedure

## 2022-03-15 NOTE — ANESTHESIA POSTPROCEDURE EVALUATION
Post-Op Assessment Note    CV Status:  Stable  Pain Score: 0    Pain management: adequate     Mental Status:  Alert and awake   Hydration Status:  Euvolemic   PONV Controlled:  Controlled   Airway Patency:  Patent      Post Op Vitals Reviewed: Yes      Staff: CRNA         No complications documented      BP   117/58   Temp     Pulse 118   Resp   22   SpO2   97%

## 2022-03-15 NOTE — PROGRESS NOTES
General Cardiology   Progress Note -  Team One   Karan Hannah 47 y o  male MRN: [id number]    Unit/Bed#: W -01 Encounter: [id number]    Assessment  1  Epigastric/abdominal pain w/hematemesis  -GI following  Planning for EGD today   -Hx of ventral hernia s/p repair    -Unable to obtain CT scan/obstruction series given his body habitus  -HGB level remains stable at 13 2   -On IV Protonix GTT  -Oral anticoagulation(Pradaxa) is on hold  2  Acute on chronic diastolic CHF  -Difficult to assess volume status given body habitus   -Subjectively states his breathing has improved since yesterday feels more comfortable lying flat this morning  -X-ray imaging - possible pulmonary vascular congestion - though limited study given his body habitus  -NT proBNP level - 752 (previously 607 - in February 2022)  -Previously on oral furosemide 80 mg b i d  (oral diuretics currently on)  -Currently on IV furosemide 60 mg b i d   -24 hour I&O balance; -650 ml - likely inaccurate  -Difficult to ascertain his true baseline dry weights  Recent weight at his nursing home facility via [address] was 534 lbs  3  Permanent atrial fibrillation   -12 lead ECG 3/13; atrial fibrillation, rate controlled   -Non telemetry currently  -On metoprolol tartrate 50 mg q 12 hours  -Previously on Pradaxa 150 mg b i d  - currently on hold see # 1  4  Essential hypertension  -Average /74, last recorded 124/70   -Outpatient BP regimen; furosemide 80 mg BID, and metoprolol tartrate 50 mg q 12 hours  5  DVT s/p IVC filter, and previously on Pradaxa 150 mg b i d   -Venous duplex study 3/14/2022; no evidence of acute or chronic DVT in the right or left lower limb  6  Morbid obesity; BMI 81  7   OHS /  acute on chronic hypoxic/hypercapnic respiratory failure - chronically wears 2 L of supplemental O2 at baseline, currently requiring 4 L of supplemental oxygen sats in the low to mid 90s   8  FILIPPO- on CPAP at HS     Plan  -Continue IV diuresis today - on IV Lasix 60 mg b i d  It appears the patient is having an adequate urinary response with the current dose of IV Lasix  Suspect there are inaccuracies in the documented I&Os as the patient states he has filled up multiple urinal bottles  Volume status and obtaining accurate weights are difficult to assess given his body habitus /inability to stand and use of the bed scale for weights     -Tentative EGD procedure today for further workup regarding # 1  -Continue metoprolol tartrate 50 mg q 12 hours   -Strict I&Os, daily weights, 2 g NA +diet 1 8 L FR   -Monitor renal function electrolytes closely  Replete to maintain K +level of 4 0, magnesium 2 0   -Pt needs extensive diet/lifestyle modifications and weight loss  -No indication for telemetry monitoring      Subjective  Review of Systems   Constitutional: Positive for malaise/fatigue  Negative for chills and fever  Eyes: Negative for visual disturbance  Cardiovascular: Positive for leg swelling  Negative for chest pain, dyspnea on exertion, orthopnea and palpitations  Respiratory: Positive for shortness of breath  Negative for cough  Gastrointestinal: Positive for abdominal pain, hematemesis and vomiting  Negative for constipation and diarrhea  Neurological: Negative for dizziness, headaches and light-headedness  Objective:   Physical Exam  Vitals and nursing note reviewed  Constitutional:       General: He is not in acute distress  Appearance: He is obese  He is not diaphoretic  HENT:      Head: Normocephalic and atraumatic  Mouth/Throat:      Mouth: Mucous membranes are dry  Comments: dried blood around his lips  Eyes:      General: No scleral icterus  Cardiovascular:      Rate and Rhythm: Normal rate and regular rhythm  Pulses: Normal pulses  Heart sounds: Normal heart sounds        Comments: Distant heart tones given his body habitus  Pulmonary:      Effort: Pulmonary effort is normal       Breath sounds: Normal breath sounds  No wheezing or rales  Abdominal:      Palpations: Abdomen is soft  Tenderness: There is abdominal tenderness  There is guarding  Comments: Morbidly obese abdomen  Musculoskeletal:      Cervical back: Neck supple  Right lower leg: Edema present  Left lower leg: Edema present  Skin:     General: Skin is warm and dry  Capillary Refill: Capillary refill takes less than 2 seconds  Neurological:      General: No focal deficit present  Mental Status: He is alert and oriented to person, place, and time  Psychiatric:         Mood and Affect: Mood normal          Vitals: Blood pressure 124/70, pulse 104, temperature (!) 97 3 °F (36 3 °C), resp  rate 16, height 5' 10" (1 778 m), weight (!) 256 kg (565 lb), SpO2 90 %  ,     Body mass index is 81 07 kg/m²  ,   Systolic (94QWB), DDB:160 , Min:123 , WBD:896     Diastolic (55CLN), ZH, Min:70, Max:80      Intake/Output Summary (Last 24 hours) at 3/15/2022 1021  Last data filed at 3/15/2022 0900  Gross per 24 hour   Intake 0 ml   Output 650 ml   Net -650 ml     Weight (last 2 days)     Date/Time Weight    03/15/22 0600 256 (565)    22 2130 248 (546 3)          LABORATORY RESULTS      CBC with diff:   Results from last 7 days   Lab Units 03/15/22  0622  0624 22  2211   WBC Thousand/uL 8 73  --  11 37* 9 41   HEMOGLOBIN g/dL 13 2 13 5 14 2 13 0   HEMATOCRIT % 42 3 42 0 45 7 39 9   MCV fL 94  --  95 92   PLATELETS Thousands/uL 180  --  202 175   MCH pg 29 4  --  29 5 29 9   MCHC g/dL 31 2*  --  31 1* 32 6   RDW % 14 7  --  14 6 14 7   MPV fL 10 4  --  10 1 10 1   NRBC AUTO /100 WBCs 0  --  0 0       CMP:  Results from last 7 days   Lab Units 03/15/22  0612 03/14/22  0624 03/13/22  2211   POTASSIUM mmol/L 4 0 4 8 4 2   CHLORIDE mmol/L 102 98* 98*   CO2 mmol/L 43* 40* 45*   BUN mg/dL 17 16 16   CREATININE mg/dL 0 92 0 97 0 99   CALCIUM mg/dL 9 2 9 7 9 5   AST U/L  --   --  16   ALT U/L  --   -- 26   ALK PHOS U/L  --   --  88   EGFR ml/min/1 73sq m 95 90 87       BMP:  Results from last 7 days   Lab Units 03/15/22  0612 22  0624 22   POTASSIUM mmol/L 4 0 4 8 4 2   CHLORIDE mmol/L 102 98* 98*   CO2 mmol/L 43* 40* 45*   BUN mg/dL 17 16 16   CREATININE mg/dL 0 92 0 97 0 99   CALCIUM mg/dL 9 2 9 7 9 5       Lab Results   Component Value Date    NTBNP 752 (H) 2022    NTBNP 607 (H) 2022    NTBNP 1,058 (H) 2021                          Results from last 7 days   Lab Units 22   INR  1 01       Lipid Profile:   No results found for: CHOL  Lab Results   Component Value Date    HDL 44 2016     Lab Results   Component Value Date    LDLCALC 80 2016     Lab Results   Component Value Date    TRIG 197 (H) 2016       Cardiac testing:   Results for orders placed during the hospital encounter of 05/10/21    Echo complete with contrast if indicated    [place] [address] [address]    Transthoracic Echocardiogram  2D, M-mode, Doppler, and Color Doppler    Study date:  11-May-2021    Patient: Lisbeth Dougherty  MR number: [id number]  Account number: [de-identified]  : 1970  Age: 49 years  Gender: Male  Status: Outpatient  [facility]: [facility]  Height: 70 in  Weight: 409 2 lb  BP: 121/ 53 mmHg    Indications: Heart failure    Diagnoses: I50 9 - Heart failure, unspecified    Sonographer:  JOSE Scott  Referring Physician:  Kelly Mari MD  Group:  [facility]  Interpreting Physician:  Galindo Le MD    SUMMARY    PROCEDURE INFORMATION:  This was a technically difficult study  LEFT VENTRICLE:  Systolic function was normal  Ejection fraction was estimated to be 60 %  This study was inadequate for the evaluation of regional wall motion      HISTORY: PRIOR HISTORY: afib, CHF, morbid obesity, DVT, COPD, DM, hypothyroid, MRSA    PROCEDURE: The study was performed in the [facility]  This was a routine study  The transthoracic approach was used  The study included complete 2D imaging, M-mode, complete spectral Doppler, and color Doppler  The heart rate was 86  bpm, at the start of the study  Images were obtained from the parasternal, apical, subcostal, and suprasternal notch acoustic windows  Echocardiographic views were limited due to restricted patient mobility, poor acoustic window  availability, decreased penetration, and lung interference  This was a technically difficult study  LEFT VENTRICLE: Size was normal  Systolic function was normal  Ejection fraction was estimated to be 60 %  This study was inadequate for the evaluation of regional wall motion  Wall thickness could not be accurately determined  DOPPLER:  The study was not technically sufficient to allow evaluation of LV diastolic function  RIGHT VENTRICLE: Systolic function was normal  Not well visualized  LEFT ATRIUM: Not well visualized  RIGHT ATRIUM: Not well visualized  MITRAL VALVE: Not well visualized  DOPPLER: The transmitral velocity was within the normal range  There was trace regurgitation  AORTIC VALVE: The valve was probably trileaflet  The valve was not well visualized  DOPPLER: Transaortic velocity was within the normal range  There was no evidence for stenosis  TRICUSPID VALVE: Not well visualized  DOPPLER: The transtricuspid velocity was within the normal range  There was trace regurgitation  Pulmonary artery systolic pressure was within the normal range  Estimated peak PA pressure was 27 mmHg  PULMONIC VALVE: Not well visualized  DOPPLER: The transpulmonic velocity was within the normal range  There was trace regurgitation  PERICARDIUM: Not well visualized  AORTA: Not well visualized      SYSTEM MEASUREMENT TABLES    2D  Ao Diam: 3 11 cm  LA Diam: 4 34 cm    CW  AV Vmax: 1 22 m/s  AV maxP 91 mmHg  PV Vmax: 1 13 m/s  PV maxP 13 mmHg  TR Vmax: 2 72 m/s  TR maxP 6 mmHg    [facility]    Prepared and electronically signed by    úJnior Corley MD  Signed 11-May-2021 11:37:20    No results found for this or any previous visit  No results found for this or any previous visit  No valid procedures specified  No results found for this or any previous visit        Meds/Allergies   all current active meds have been reviewed and current meds:   Current Facility-Administered Medications   Medication Dose Route Frequency    acetaminophen (TYLENOL) tablet 650 mg  650 mg Oral Q6H PRN    aluminum-magnesium hydroxide-simethicone (MYLANTA) oral suspension 30 mL  30 mL Oral Q6H PRN    ammonium lactate (LAC-HYDRIN) 12 % cream   Topical BID    budesonide-formoterol (SYMBICORT) 160-4 5 mcg/act inhaler 2 puff  2 puff Inhalation BID    buPROPion (WELLBUTRIN XL) 24 hr tablet 450 mg  450 mg Oral Daily    cholecalciferol (VITAMIN D3) tablet 2,000 Units  2,000 Units Oral Daily    dicyclomine (BENTYL) tablet 20 mg  20 mg Oral 4x Daily (AC & HS)    famotidine (PEPCID) injection 20 mg  20 mg Intravenous Q24H MARY    furosemide (LASIX) injection 60 mg  60 mg Intravenous BID    HYDROmorphone (DILAUDID) injection 0 5 mg  0 5 mg Intravenous Q4H PRN    hydrOXYzine HCL (ATARAX) tablet 25 mg  25 mg Oral TID PRN    levothyroxine tablet 25 mcg  25 mcg Oral Early Morning    metoprolol tartrate (LOPRESSOR) tablet 50 mg  50 mg Oral Q12H MARY    nystatin (MYCOSTATIN) cream   Topical BID    ondansetron (ZOFRAN) injection 4 mg  4 mg Intravenous Q6H PRN    oxyCODONE (ROXICODONE) IR tablet 2 5 mg  2 5 mg Oral Q6H PRN    oxyCODONE (ROXICODONE) IR tablet 5 mg  5 mg Oral Q6H PRN    pantoprazole (PROTONIX) 80 mg in sodium chloride 0 9 % 100 mL infusion  8 mg/hr Intravenous Continuous    sodium chloride (OCEAN) 0 65 % nasal spray 1 spray  1 spray Each Nare Q2H PRN    spironolactone (ALDACTONE) tablet 50 mg  50 mg Oral Daily pantoprozole (PROTONIX) infusion (Continuous), 8 mg/hr, Last Rate: 8 mg/hr (03/15/22 0324)      EKG personally reviewed by RADHA Knapp    Assessment:  Principal Problem:    Intractable abdominal pain  Active Problems: Morbid obesity ([address] )    Obesity hypoventilation syndrome/FILIPPO    Acute on chronic diastolic congestive heart failure (HCC)    Atrial fibrillation (HCC)    COPD (chronic obstructive pulmonary disease) (HCC)    Hypothyroidism    History of DVT (deep vein thrombosis)    Acute on chronic respiratory failure with hypoxia (HCC)    Counseling / Coordination of Care  Total floor / unit time spent today 20 minutes  Greater than 50% of total time was spent with the patient and / or family counseling and / or coordination of care  ** Please Note: Dragon 360 Dictation voice to text software may have been used in the creation of this document   **

## 2022-03-15 NOTE — ASSESSMENT & PLAN NOTE
· Patient wears 2 L of supplemental oxygen chronically/baseline  POA, requiring 4 L with SpO2 92%  · Suspect secondary to CHF exacerbation  · COVID/Influenza/RSV negative  · Patient on 3 5 L O2 NC  Plan  · Wean O2 as tolerated  · On diuresis  · Respiratory protocol

## 2022-03-15 NOTE — PLAN OF CARE
Problem: Potential for Falls  Goal: Patient will remain free of falls  Description: INTERVENTIONS:  - Educate patient/family on patient safety including physical limitations  - Instruct patient to call for assistance with activity   - Consult OT/PT to assist with strengthening/mobility   - Keep Call bell within reach  - Keep bed low and locked with side rails adjusted as appropriate  - Keep care items and personal belongings within reach  - Initiate and maintain comfort rounds  - Make Fall Risk Sign visible to staff  - Offer Toileting every 2 Hours, in advance of need  - Initiate/Maintain bed alarm  - Obtain necessary fall risk management equipment: bed alarm   - Apply yellow socks and bracelet for high fall risk patients  - Consider moving patient to room near nurses station  Outcome: Progressing     Problem: MOBILITY - ADULT  Goal: Maintain or return to baseline ADL function  Description: INTERVENTIONS:  -  Assess patient's ability to carry out ADLs; assess patient's baseline for ADL function and identify physical deficits which impact ability to perform ADLs (bathing, care of mouth/teeth, toileting, grooming, dressing, etc )  - Assess/evaluate cause of self-care deficits   - Assess range of motion  - Assess patient's mobility; develop plan if impaired  - Assess patient's need for assistive devices and provide as appropriate  - Encourage maximum independence but intervene and supervise when necessary  - Involve family in performance of ADLs  - Assess for home care needs following discharge   - Consider OT consult to assist with ADL evaluation and planning for discharge  - Provide patient education as appropriate  Outcome: Progressing  Goal: Maintains/Returns to pre admission functional level  Description: INTERVENTIONS:  - Perform BMAT or MOVE assessment daily    - Set and communicate daily mobility goal to care team and patient/family/caregiver     - Collaborate with rehabilitation services on mobility goals if consulted  - Perform Range of Motion 3 times a day  - Reposition patient every 2 hours    - Dangle patient 3 times a day  - Stand patient 3 times a day  - Ambulate patient 3 times a day  - Out of bed to chair 3 times a day   - Out of bed for meals 3 times a day  - Out of bed for toileting  - Record patient progress and toleration of activity level   Outcome: Progressing     Problem: Prexisting or High Potential for Compromised Skin Integrity  Goal: Skin integrity is maintained or improved  Description: INTERVENTIONS:  - Identify patients at risk for skin breakdown  - Assess and monitor skin integrity  - Assess and monitor nutrition and hydration status  - Monitor labs   - Assess for incontinence   - Turn and reposition patient  - Assist with mobility/ambulation  - Relieve pressure over bony prominences  - Avoid friction and shearing  - Provide appropriate hygiene as needed including keeping skin clean and dry  - Evaluate need for skin moisturizer/barrier cream  - Collaborate with interdisciplinary team   - Patient/family teaching  - Consider wound care consult   Outcome: Progressing

## 2022-03-15 NOTE — PLAN OF CARE
Problem: Potential for Falls  Goal: Patient will remain free of falls  Description: INTERVENTIONS:  - Educate patient/family on patient safety including physical limitations  - Instruct patient to call for assistance with activity   - Consult OT/PT to assist with strengthening/mobility   - Keep Call bell within reach  - Keep bed low and locked with side rails adjusted as appropriate  - Keep care items and personal belongings within reach  - Initiate and maintain comfort rounds  - Make Fall Risk Sign visible to staff  - Offer Toileting every 2 Hours, in advance of need  - Initiate/Maintain alarm  - Obtain necessary fall risk management equipment:   - Apply yellow socks and bracelet for high fall risk patients  - Consider moving patient to room near nurses station  Outcome: Progressing     Problem: MOBILITY - ADULT  Goal: Maintain or return to baseline ADL function  Description: INTERVENTIONS:  -  Assess patient's ability to carry out ADLs; assess patient's baseline for ADL function and identify physical deficits which impact ability to perform ADLs (bathing, care of mouth/teeth, toileting, grooming, dressing, etc )  - Assess/evaluate cause of self-care deficits   - Assess range of motion  - Assess patient's mobility; develop plan if impaired  - Assess patient's need for assistive devices and provide as appropriate  - Encourage maximum independence but intervene and supervise when necessary  - Involve family in performance of ADLs  - Assess for home care needs following discharge   - Consider OT consult to assist with ADL evaluation and planning for discharge  - Provide patient education as appropriate  Outcome: Progressing  Goal: Maintains/Returns to pre admission functional level  Description: INTERVENTIONS:  - Perform BMAT or MOVE assessment daily    - Set and communicate daily mobility goal to care team and patient/family/caregiver     - Collaborate with rehabilitation services on mobility goals if consulted  - Perform Range of Motion  times a day  - Reposition patient every  hours    - Dangle patient  times a day  - Stand patient  times a day  - Ambulate patient  times a day  - Out of bed to chair  times a day   - Out of bed for meals times a day  - Out of bed for toileting  - Record patient progress and toleration of activity level   Outcome: Progressing     Problem: Prexisting or High Potential for Compromised Skin Integrity  Goal: Skin integrity is maintained or improved  Description: INTERVENTIONS:  - Identify patients at risk for skin breakdown  - Assess and monitor skin integrity  - Assess and monitor nutrition and hydration status  - Monitor labs   - Assess for incontinence   - Turn and reposition patient  - Assist with mobility/ambulation  - Relieve pressure over bony prominences  - Avoid friction and shearing  - Provide appropriate hygiene as needed including keeping skin clean and dry  - Evaluate need for skin moisturizer/barrier cream  - Collaborate with interdisciplinary team   - Patient/family teaching  - Consider wound care consult   Outcome: Progressing

## 2022-03-15 NOTE — PROGRESS NOTES
Progress Note - General Surgery   Avinash Dietrich 47 y o  male MRN: [id number]  Unit/Bed#: W -01 Encounter: [id number]    Assessment:  46 y/o M w/ hx of super morbid obesity w/ BMI of 78, p/w abdominal pain, nausea/vomiting, SOB, chest pain    Plan:  --EGD today w/ GI  --Diet as tolerated, NPO currently for EGD  --Pain control  --Surgery will sign off, please call for any additional questions/concerns    Subjective/Objective     Subjective:     No acute events overnight  This morning, pt c/o mid-abdominal pain, denies any nausea or emesis  Objective:     Blood pressure 123/73, pulse 84, temperature (!) 97 3 °F (36 3 °C), resp  rate 16, height 5' 10" (1 778 m), weight (!) 248 kg (546 lb 4 8 oz), SpO2 91 %  ,Body mass index is 78 39 kg/m²  Intake/Output Summary (Last 24 hours) at 3/14/2022 2032  Last data filed at 3/14/2022 1809  Gross per 24 hour   Intake 1000 ml   Output 1500 ml   Net -500 ml       Invasive Devices  Report    Peripheral Intravenous Line            Peripheral IV 03/13/22 Left;Ventral (anterior); Lateral Forearm <1 day                Physical Exam:    GEN: NAD  HEENT: MMM  CV: RRR  Lung: normal effort  Ab: Soft, NT/ND  Extrem: No CCE  Neuro:  A+Ox3, motor and sensation grossly intact    Lab, Imaging and other studies:  CBC:   Lab Results   Component Value Date    WBC 11 37 (H) 03/14/2022    HGB 14 2 03/14/2022    HCT 45 7 03/14/2022    MCV 95 03/14/2022     03/14/2022    MCH 29 5 03/14/2022    MCHC 31 1 (L) 03/14/2022    RDW 14 6 03/14/2022    MPV 10 1 03/14/2022    NRBC 0 03/14/2022   , CMP:   Lab Results   Component Value Date    SODIUM 143 03/14/2022    K 4 8 03/14/2022    CL 98 (L) 03/14/2022    CO2 40 (H) 03/14/2022    BUN 16 03/14/2022    CREATININE 0 97 03/14/2022    CALCIUM 9 7 03/14/2022    AST 16 03/13/2022    ALT 26 03/13/2022    ALKPHOS 88 03/13/2022    EGFR 90 03/14/2022   , Coagulation:   Lab Results   Component Value Date    INR 1 01 03/13/2022   , Urinalysis: No results found for: Kinjal Allen, LAURA, PHUR, LEUKOCYTESUR, NITRITE, PROTEINUA, GLUCOSEU, KETONESU, BILIRUBINUR, BLOODU  VTE Pharmacologic Prophylaxis: Sequential compression device (Venodyne)   VTE Mechanical Prophylaxis: sequential compression device

## 2022-03-15 NOTE — ASSESSMENT & PLAN NOTE
· Presentation: complaints of dull, aching generalized abdominal pain for 2 days  He denies radiation of the pain  He reports associated symptoms of nausea and vomiting  He denies fever, chills, diarrhea, constipation or urinary symptoms  · Unable to obtain CT imaging/Obstruction series due to large body habitus  · Surgery evaluated patient in ED, it was determined no surgical intervention was needed at this time  Plan  · Pain control    · Zofran  · IV Protonix  · NPO for planned EGD - Restart diet after procedure  · Gastroenterology consulted, appreciate recommendation

## 2022-03-16 LAB
BUN SERPL-MCNC: 19 MG/DL (ref 5–25)
CALCIUM SERPL-MCNC: 9.2 MG/DL (ref 8.3–10.1)
CHLORIDE SERPL-SCNC: 98 MMOL/L (ref 100–108)
CO2 SERPL-SCNC: >45 MMOL/L (ref 21–32)
CREAT SERPL-MCNC: 0.92 MG/DL (ref 0.6–1.3)
ERYTHROCYTE [DISTWIDTH] IN BLOOD BY AUTOMATED COUNT: 14.6 % (ref 11.6–15.1)
GFR SERPL CREATININE-BSD FRML MDRD: 95 ML/MIN/1.73SQ M
GLUCOSE SERPL-MCNC: 125 MG/DL (ref 65–140)
HCT VFR BLD AUTO: 39.9 % (ref 36.5–49.3)
HGB BLD-MCNC: 12.3 G/DL (ref 12–17)
MCH RBC QN AUTO: 29.3 PG (ref 26.8–34.3)
MCHC RBC AUTO-ENTMCNC: 30.8 G/DL (ref 31.4–37.4)
MCV RBC AUTO: 95 FL (ref 82–98)
MRSA NOSE QL CULT: NORMAL
PLATELET # BLD AUTO: 178 THOUSANDS/UL (ref 149–390)
PMV BLD AUTO: 10.3 FL (ref 8.9–12.7)
POTASSIUM SERPL-SCNC: 3.9 MMOL/L (ref 3.5–5.3)
RBC # BLD AUTO: 4.2 MILLION/UL (ref 3.88–5.62)
SODIUM SERPL-SCNC: 146 MMOL/L (ref 136–145)
WBC # BLD AUTO: 5.15 THOUSAND/UL (ref 4.31–10.16)

## 2022-03-16 PROCEDURE — 99232 SBSQ HOSP IP/OBS MODERATE 35: CPT | Performed by: INTERNAL MEDICINE

## 2022-03-16 PROCEDURE — 85027 COMPLETE CBC AUTOMATED: CPT

## 2022-03-16 PROCEDURE — 99232 SBSQ HOSP IP/OBS MODERATE 35: CPT | Performed by: NURSE PRACTITIONER

## 2022-03-16 PROCEDURE — C9113 INJ PANTOPRAZOLE SODIUM, VIA: HCPCS | Performed by: INTERNAL MEDICINE

## 2022-03-16 PROCEDURE — 80048 BASIC METABOLIC PNL TOTAL CA: CPT

## 2022-03-16 PROCEDURE — 99232 SBSQ HOSP IP/OBS MODERATE 35: CPT | Performed by: HOSPITALIST

## 2022-03-16 RX ORDER — METOCLOPRAMIDE 10 MG/1
10 TABLET ORAL
Status: DISCONTINUED | OUTPATIENT
Start: 2022-03-16 | End: 2022-03-18 | Stop reason: HOSPADM

## 2022-03-16 RX ADMIN — BUPROPION HYDROCHLORIDE 450 MG: 150 TABLET, FILM COATED, EXTENDED RELEASE ORAL at 08:26

## 2022-03-16 RX ADMIN — DICYCLOMINE HYDROCHLORIDE 20 MG: 20 TABLET ORAL at 08:28

## 2022-03-16 RX ADMIN — OXYCODONE HYDROCHLORIDE 2.5 MG: 5 TABLET ORAL at 21:59

## 2022-03-16 RX ADMIN — DABIGATRAN ETEXILATE MESYLATE 150 MG: 150 CAPSULE ORAL at 08:26

## 2022-03-16 RX ADMIN — Medication 2000 UNITS: at 08:28

## 2022-03-16 RX ADMIN — FUROSEMIDE 60 MG: 10 INJECTION, SOLUTION INTRAMUSCULAR; INTRAVENOUS at 09:15

## 2022-03-16 RX ADMIN — DABIGATRAN ETEXILATE MESYLATE 150 MG: 150 CAPSULE ORAL at 21:53

## 2022-03-16 RX ADMIN — BUDESONIDE AND FORMOTEROL FUMARATE DIHYDRATE 2 PUFF: 160; 4.5 AEROSOL RESPIRATORY (INHALATION) at 17:24

## 2022-03-16 RX ADMIN — Medication: at 17:24

## 2022-03-16 RX ADMIN — BUDESONIDE AND FORMOTEROL FUMARATE DIHYDRATE 2 PUFF: 160; 4.5 AEROSOL RESPIRATORY (INHALATION) at 08:31

## 2022-03-16 RX ADMIN — DICYCLOMINE HYDROCHLORIDE 20 MG: 20 TABLET ORAL at 21:53

## 2022-03-16 RX ADMIN — FAMOTIDINE 20 MG: 10 INJECTION INTRAVENOUS at 09:15

## 2022-03-16 RX ADMIN — SPIRONOLACTONE 50 MG: 25 TABLET ORAL at 08:26

## 2022-03-16 RX ADMIN — FUROSEMIDE 60 MG: 10 INJECTION, SOLUTION INTRAMUSCULAR; INTRAVENOUS at 17:26

## 2022-03-16 RX ADMIN — DICYCLOMINE HYDROCHLORIDE 20 MG: 20 TABLET ORAL at 17:23

## 2022-03-16 RX ADMIN — METOCLOPRAMIDE 10 MG: 10 TABLET ORAL at 08:28

## 2022-03-16 RX ADMIN — METOPROLOL TARTRATE 50 MG: 50 TABLET, FILM COATED ORAL at 21:53

## 2022-03-16 RX ADMIN — DICYCLOMINE HYDROCHLORIDE 20 MG: 20 TABLET ORAL at 11:49

## 2022-03-16 RX ADMIN — METOCLOPRAMIDE 10 MG: 10 TABLET ORAL at 11:49

## 2022-03-16 RX ADMIN — METOPROLOL TARTRATE 50 MG: 50 TABLET, FILM COATED ORAL at 08:28

## 2022-03-16 RX ADMIN — METOCLOPRAMIDE 10 MG: 10 TABLET ORAL at 17:22

## 2022-03-16 RX ADMIN — LEVOTHYROXINE SODIUM 25 MCG: 25 TABLET ORAL at 05:02

## 2022-03-16 RX ADMIN — Medication 1 APPLICATION: at 08:30

## 2022-03-16 RX ADMIN — HYDROMORPHONE HYDROCHLORIDE 0.5 MG: 1 INJECTION, SOLUTION INTRAMUSCULAR; INTRAVENOUS; SUBCUTANEOUS at 12:40

## 2022-03-16 RX ADMIN — SODIUM CHLORIDE 8 MG/HR: 9 INJECTION, SOLUTION INTRAVENOUS at 16:37

## 2022-03-16 RX ADMIN — OXYCODONE HYDROCHLORIDE 5 MG: 5 TABLET ORAL at 04:12

## 2022-03-16 RX ADMIN — SODIUM CHLORIDE 8 MG/HR: 9 INJECTION, SOLUTION INTRAVENOUS at 01:35

## 2022-03-16 RX ADMIN — NYSTATIN: 100000 CREAM TOPICAL at 08:30

## 2022-03-16 RX ADMIN — HYDROMORPHONE HYDROCHLORIDE 0.5 MG: 1 INJECTION, SOLUTION INTRAMUSCULAR; INTRAVENOUS; SUBCUTANEOUS at 05:02

## 2022-03-16 RX ADMIN — OXYCODONE HYDROCHLORIDE 5 MG: 5 TABLET ORAL at 10:28

## 2022-03-16 NOTE — CONSULTS
Consult Note - Wound   Jayro Pruitt 47 y o  male MRN: [id number]  Unit/Bed#: W -01 Encounter: [id number]      Assessment:   Virtual wound care nurse consult for intertriginous dermatitis (ITD) in groin folds  Currently Nystatin cream being used  One small open area in left groin, per Germaine Zelaya LPN  Staff will image wound  Fungal smell, per staff  Wound/Skin Care Plan:   1  Recommend stopping Nystatin cream as this adds more moisture to area  Recommend starting Interdry wicking fabric that is impregnated with silver  Interdry will wick fabric from the skin fold and the silver will treat topically the cutaneous fungal infection  2  Will review image once available  Discussed with Germaine Zelaya LPN  Addendum: Images taken  Interdry appropriate  Wound in inner left groin

## 2022-03-16 NOTE — ASSESSMENT & PLAN NOTE
Wt Readings from Last 3 Encounters:   03/17/22 (!) 256 kg (564 lb 9 6 oz)   02/22/22 (!) 242 kg (534 lb)   02/18/22 (!) 242 kg (534 lb 6 3 oz)    Chest x-ray: Large body habitus, unremarkable  Last ECHO on file from July 4123: LV systolic function was normal  Ejection fraction was estimated to be 60%  BNP: 752    Plan  Torsemide 40 mg b i d    Continue Metoprolol tartrate 50 mg b i d    Monitor intake and output, daily weights  Cardiology following, recommendations appreciated  Outpatient follow up with Cardiology

## 2022-03-16 NOTE — PROGRESS NOTES
Progress Note - Courtney Wahl 47 y o  male MRN: [id number]    Unit/Bed#: W -01 Encounter: [id number]    Assessment and Plan:     1  Abdominal pain with nausea and vomiting  EGD performed yesterday with mid and distal esophagitis was scope not being able to advance to the gastric cardia due to large amount of food retained therefore procedure was aborted  Suspecting food bezoar  Patient placed on liquid diet and Reglan     -unfortunately, due to BMI we are unable to obtain any further imaging including upper GI series  Primary team reached out to obtain CT scan with Bariatric scan in cancer center however patient's hip measurement is over 80 cm therefore unable to use this  According to primary team, only other option would be a scanner out of network as [facility] are the only ones have Bariatric scanners     -continue with clear liquid diet   -continue Reglan     -continue to monitor abdominal exam as well as bowel movements     -will discuss further recommendations with attending for management going forward based on above     ----------------------------------------------------------------------------------------------------------------    Subjective:     Patient reports he is doing okay  He reports his abdominal pain is a little better but still present  No nausea or vomiting  He had some ginger ale yesterday without nausea or vomiting  Last bowel movement was yesterday  Objective:     Vitals: Blood pressure 110/61, pulse 104, temperature 97 6 °F (36 4 °C), resp  rate 19, height 5' 10" (1 778 m), weight (!) 256 kg (565 lb 0 6 oz), SpO2 91 %  ,Body mass index is 81 07 kg/m²  Intake/Output Summary (Last 24 hours) at 3/16/2022 1346  Last data filed at 3/16/2022 1028  Gross per 24 hour   Intake 1370 67 ml   Output 1670 ml   Net -299 33 ml       Physical Exam:     General Appearance: Alert, cooperative    Lungs: Clear to auscultation bilaterally, no rales or rhonchi, no labored breathing/accessory muscle use  Heart: Regular rate and rhythm, S1, S2 normal, no murmur, click, rub or gallop  Abdomen: + abdominal hernias noted  Reports some tenderness to palpation in the epigastric area  Bowel sounds present  Extremities: No cyanosis, clubbing, or edema    Invasive Devices  Report    Peripheral Intravenous Line            Peripheral IV 03/15/22 Left;Ventral (anterior) Forearm 1 day                Lab Results:  Results from last 7 days   Lab Units 03/16/22  0529 03/15/22  1109 03/15/22  0612   WBC Thousand/uL 5 15  --  8 73   HEMOGLOBIN g/dL 12 3   < > 13 2   HEMATOCRIT % 39 9   < > 42 3   PLATELETS Thousands/uL 178  --  180   NEUTROS PCT %  --   --  84*   LYMPHS PCT %  --   --  7*   MONOS PCT %  --   --  7   EOS PCT %  --   --  0    < > = values in this interval not displayed  Results from last 7 days   Lab Units 03/16/22  0529 03/14/22  0624 03/13/22  2211   POTASSIUM mmol/L 3 9   < > 4 2   CHLORIDE mmol/L 98*   < > 98*   CO2 mmol/L >45*   < > 45*   BUN mg/dL 19   < > 16   CREATININE mg/dL 0 92   < > 0 99   CALCIUM mg/dL 9 2   < > 9 5   ALK PHOS U/L  --   --  88   ALT U/L  --   --  26   AST U/L  --   --  16    < > = values in this interval not displayed  Invalid input(s): BILI  Results from last 7 days   Lab Units 03/13/22  2211   INR  1 01     Results from last 7 days   Lab Units 03/13/22  2211   LIPASE u/L 228       Imaging Studies: I have personally reviewed pertinent imaging studies  EGD    Result Date: 3/15/2022  Impression: Mid and distal esophagitis, scope was advanced to the gastric cardia were large amount of food was retained  The procedure was aborted at that point due to risk of aspiration    There was a copious amount of food up to his gastric cardia Suspect food bezoar RECOMMENDATION: Follow up with me in clinic  Return to floor Recommend Reglan Recommend clear liquid diet Check upper GI series  Zainab Lopez MD

## 2022-03-16 NOTE — ASSESSMENT & PLAN NOTE
Patient with history of gastric sleeve surgery  · Encouraged lifestyle modifications    · Referral to outpatient weight management program

## 2022-03-16 NOTE — ASSESSMENT & PLAN NOTE
· Presentation: complaints of dull, aching generalized abdominal pain for 2 days  He denies radiation of the pain  He reports associated symptoms of nausea and vomiting  He denies fever, chills, diarrhea, constipation or urinary symptoms  · Unable to obtain CT imaging/Obstruction series due to large body habitus  · Surgery evaluated patient in ED, it was determined no surgical intervention was needed at this time  · EGD - mid-distal esophagitis  Food retention in gastric cardia  Procedure reported due to risk of aspiration  Suspect food bezoar  Gastroparesis? · Most recent EKG QTc 417  · Patient reports that experiences abdominal pain, but is responsive to pain regimen  Patient had 2 bowel movements yesterday of soft consistency  Plan  · P o  Protonix, Pepcid  · Bowel regimen - MiraLax and Colace  · Reglan Ragini - watch for Tardive dyskinesia    · Soft surgical diet, advanced as tolerated  · Gastroenterology consulted, appreciate recommendation - gastric emptying scan as outpatient  · Unable to obtain CT AP w IV  Contacted CT team at Cancer center, as they have Bariatric CT  Patient's hip measurement of greater than 80 cm  He is unable to fit in CT bore opening  Out next option would be an out of network scanner  [facility] was unable to perform abdominal imaging on patient due to body habitus  · If symptoms recur or worsen and PCP unable to assist, would recommend to arrive at [facility] for possible availability of imaging that can accommodate patient

## 2022-03-16 NOTE — PROGRESS NOTES
General Cardiology   Progress Note -  Team One   Priscila Gabrielfast 47 y o  male MRN: [id number]    Unit/Bed#: W -01 Encounter: [id number]    Assessment  1  Epigastric/abdominal pain w/hematemesis  -Management per the GI service  -EGD 3/15; mid and distal esophagitis  2  Acute on chronic diastolic CHF  -Difficult to assess volume status given body habitus   -Subjectively states his breathing is about 50% improved since admission and gradually improving even since yesterday  -X-ray imaging - possible pulmonary vascular congestion - though limited study given his body habitus  -NT proBNP level - 752 (previously 607 - in February 2022)  -Previously on oral furosemide 80 mg b i d  (oral diuretics currently on)  -Currently on IV furosemide 60 mg b i d   -24 hour I&O balance; -660 ml; overall -1 2 L  -Difficult to ascertain his true baseline dry weights   Recent weight at his nursing home facility via [address] was 534 lbs  3  Permanent atrial fibrillation   -12 lead ECG 3/13; atrial fibrillation, rate controlled   -Non telemetry currently  -On metoprolol tartrate 50 mg q 12 hours  -Previously on Pradaxa 150 mg b i d  - currently on hold see # 1  4  Essential hypertension  -Average /70, last recorded 110/61,   -Outpatient BP regimen; furosemide 80 mg BID, and metoprolol tartrate 50 mg q 12 hours  -Inpatient BP regimen:  Metoprolol tartrate 50 mg q 12 hours, and IV furosemide 60 mg b i d   5  DVT s/p IVC filter, and previously on Pradaxa 150 mg b i d (currently on hold see #1)  -Venous duplex study 3/14/2022; no evidence of acute or chronic DVT in the right or left lower limb  6  Morbid obesity; BMI 81  7  OHS /  acute on chronic hypoxic/hypercapnic respiratory failure - chronically wears 2 L of supplemental O2 at baseline, currently requiring 4 L of supplemental oxygen sats in the low to mid 90s   8  FILIPPO- on CPAP at      Plan  -Continue IV diuresis today - on IV Lasix 60 mg b i d    It appears the patient is having an adequate urinary response and respiratory status is gradually improving with the current dose of IV Lasix  Renal function also remains stable  Volume status and obtaining accurate weights are difficult to assess given his body habitus /inability to stand and use of the bed scale for weights     -Continue metoprolol tartrate 50 mg q 12 hours   -Resume oral anticoagulation with Pradaxa once cleared by the GI service   -Strict I&Os, daily weights, 2 g NA +diet 1 8 L FR   -Monitor renal function electrolytes closely   Replete to maintain K +level of 4 0, magnesium 2 0   -Pt needs extensive diet/lifestyle modifications and weight loss  -No indication for telemetry monitoring  Subjective  Review of Systems   Constitutional: Positive for malaise/fatigue  Negative for chills and fever  Eyes: Negative for visual disturbance  Cardiovascular: Positive for leg swelling  Negative for chest pain, dyspnea on exertion, orthopnea and palpitations  Respiratory: Positive for shortness of breath  Negative for cough  Gastrointestinal: Positive for abdominal pain, nausea and vomiting  Neurological: Negative for dizziness, headaches and light-headedness  Objective:   Physical Exam  Vitals and nursing note reviewed  Constitutional:       General: He is not in acute distress  Appearance: He is obese  He is not diaphoretic  HENT:      Head: Normocephalic and atraumatic  Mouth/Throat:      Mouth: Mucous membranes are moist    Eyes:      General: No scleral icterus  Cardiovascular:      Rate and Rhythm: Normal rate  Rhythm irregular  Pulses: Normal pulses  Heart sounds: Normal heart sounds  Comments: Distant heart tones given body habitus  Pulmonary:      Effort: Pulmonary effort is normal       Breath sounds: Normal breath sounds  No wheezing or rales  Comments: Breath sounds diminished bilaterally  Abdominal:      Palpations: Abdomen is soft  Tenderness:  There is abdominal tenderness  Comments: Morbidly obese abdomen  Musculoskeletal:      Cervical back: Neck supple  Right lower leg: Edema present  Left lower leg: Edema present  Skin:     General: Skin is warm and dry  Capillary Refill: Capillary refill takes less than 2 seconds  Neurological:      General: No focal deficit present  Mental Status: He is alert and oriented to person, place, and time  Psychiatric:         Mood and Affect: Mood normal          Vitals: Blood pressure 110/61, pulse 104, temperature 97 6 °F (36 4 °C), resp  rate 19, height 5' 10" (1 778 m), weight (!) 256 kg (565 lb 0 6 oz), SpO2 91 % ,     Body mass index is 81 07 kg/m²  ,   Systolic (05IES), MGC:212 , Min:110 , DFZ:290     Diastolic (80JOI), FBH:46, Min:55, Max:93      Intake/Output Summary (Last 24 hours) at 3/16/2022 0924  Last data filed at 3/16/2022 0509  Gross per 24 hour   Intake 1010 67 ml   Output 1670 ml   Net -659 33 ml     Weight (last 2 days)     Date/Time Weight    03/16/22 0546 256 (565 04)    03/15/22 0600 256 (565)          LABORATORY RESULTS      CBC with diff:   Results from last 7 days   Lab Units 03/16/22  0529 03/15/22  1109 03/15/22  0612 03/14/22  2120 03/14/22  0624 03/13/22  2211   WBC Thousand/uL 5 15  --  8 73  --  11 37* 9 41   HEMOGLOBIN g/dL 12 3 12 7 13 2 13 5 14 2 13 0   HEMATOCRIT % 39 9 40 8 42 3 42 0 45 7 39 9   MCV fL 95  --  94  --  95 92   PLATELETS Thousands/uL 178  --  180  --  202 175   MCH pg 29 3  --  29 4  --  29 5 29 9   MCHC g/dL 30 8*  --  31 2*  --  31 1* 32 6   RDW % 14 6  --  14 7  --  14 6 14 7   MPV fL 10 3  --  10 4  --  10 1 10 1   NRBC AUTO /100 WBCs  --   --  0  --  0 0       CMP:  Results from last 7 days   Lab Units 03/16/22  0529 03/15/22  0612 03/14/22  0624 03/13/22  2211   POTASSIUM mmol/L 3 9 4 0 4 8 4 2   CHLORIDE mmol/L 98* 102 98* 98*   CO2 mmol/L >45* 43* 40* 45*   BUN mg/dL 19 17 16 16   CREATININE mg/dL 0 92 0 92 0 97 0 99   CALCIUM mg/dL 9 2 9 2 9 7 9 5   AST U/L  --   --   --  16   ALT U/L  --   --   --  26   ALK PHOS U/L  --   --   --  88   EGFR ml/min/1 73sq m 95 95 90 87       BMP:  Results from last 7 days   Lab Units 22  0529 03/15/22  0612 22  0624 22  2211   POTASSIUM mmol/L 3 9 4 0 4 8 4 2   CHLORIDE mmol/L 98* 102 98* 98*   CO2 mmol/L >45* 43* 40* 45*   BUN mg/dL 19 17 16 16   CREATININE mg/dL 0 92 0 92 0 97 0 99   CALCIUM mg/dL 9 2 9 2 9 7 9 5       Lab Results   Component Value Date    NTBNP 752 (H) 2022    NTBNP 607 (H) 2022    NTBNP 1,058 (H) 2021                          Results from last 7 days   Lab Units 22  2211   INR  1 01       Lipid Profile:   No results found for: CHOL  Lab Results   Component Value Date    HDL 44 2016     Lab Results   Component Value Date    LDLCALC 80 2016     Lab Results   Component Value Date    TRIG 197 (H) 2016       Cardiac testing:   Results for orders placed during the hospital encounter of 05/10/21    Echo complete with contrast if indicated    [address]    Transthoracic Echocardiogram  2D, M-mode, Doppler, and Color Doppler    Study date:  11-May-2021    Patient: Ricky Powell  MR number: [id number]  Account number: [de-identified]  : 1970  Age: 49 years  Gender: Male  Status: Outpatient  Location: [facility]  Height: 70 in  Weight: 409 2 lb  BP: 121/ 53 mmHg    Indications: Heart failure    Diagnoses: I50 9 - Heart failure, unspecified    Sonographer:  JOSE Gregory  Referring Physician:  Lorena Cherry MD  Group:  [facility]  Interpreting Physician:  Denton Lesches, MD    SUMMARY    PROCEDURE INFORMATION:  This was a technically difficult study  LEFT VENTRICLE:  Systolic function was normal  Ejection fraction was estimated to be 60 %  This study was inadequate for the evaluation of regional wall motion      HISTORY: PRIOR HISTORY: afib, CHF, morbid obesity, DVT, COPD, DM, hypothyroid, MRSA    PROCEDURE: The study was performed in the [facility]  This was a routine study  The transthoracic approach was used  The study included complete 2D imaging, M-mode, complete spectral Doppler, and color Doppler  The heart rate was 86  bpm, at the start of the study  Images were obtained from the parasternal, apical, subcostal, and suprasternal notch acoustic windows  Echocardiographic views were limited due to restricted patient mobility, poor acoustic window  availability, decreased penetration, and lung interference  This was a technically difficult study  LEFT VENTRICLE: Size was normal  Systolic function was normal  Ejection fraction was estimated to be 60 %  This study was inadequate for the evaluation of regional wall motion  Wall thickness could not be accurately determined  DOPPLER:  The study was not technically sufficient to allow evaluation of LV diastolic function  RIGHT VENTRICLE: Systolic function was normal  Not well visualized  LEFT ATRIUM: Not well visualized  RIGHT ATRIUM: Not well visualized  MITRAL VALVE: Not well visualized  DOPPLER: The transmitral velocity was within the normal range  There was trace regurgitation  AORTIC VALVE: The valve was probably trileaflet  The valve was not well visualized  DOPPLER: Transaortic velocity was within the normal range  There was no evidence for stenosis  TRICUSPID VALVE: Not well visualized  DOPPLER: The transtricuspid velocity was within the normal range  There was trace regurgitation  Pulmonary artery systolic pressure was within the normal range  Estimated peak PA pressure was 27 mmHg  PULMONIC VALVE: Not well visualized  DOPPLER: The transpulmonic velocity was within the normal range  There was trace regurgitation  PERICARDIUM: Not well visualized  AORTA: Not well visualized      SYSTEM MEASUREMENT TABLES    2D  Ao Diam: 3 11 cm  LA Diam: 4 34 cm    CW  AV Vmax: 1 22 m/s  AV maxP 91 mmHg  PV Vmax: 1 13 m/s  PV maxP 13 mmHg  TR Vmax: 2 72 m/s  TR maxP 6 mmHg    [facility] Accredited Echocardiography Laboratory    Prepared and electronically signed by    Lester Johnson MD  Signed 11-May-2021 11:37:20    No results found for this or any previous visit  No results found for this or any previous visit  No valid procedures specified  No results found for this or any previous visit        Meds/Allergies   all current active meds have been reviewed and current meds:   Current Facility-Administered Medications   Medication Dose Route Frequency    acetaminophen (TYLENOL) tablet 650 mg  650 mg Oral Q6H PRN    albuterol inhalation solution 2 5 mg  2 5 mg Nebulization Q6H PRN    aluminum-magnesium hydroxide-simethicone (MYLANTA) oral suspension 30 mL  30 mL Oral Q6H PRN    ammonium lactate (LAC-HYDRIN) 12 % cream   Topical BID    budesonide-formoterol (SYMBICORT) 160-4 5 mcg/act inhaler 2 puff  2 puff Inhalation BID    buPROPion (WELLBUTRIN XL) 24 hr tablet 450 mg  450 mg Oral Daily    cholecalciferol (VITAMIN D3) tablet 2,000 Units  2,000 Units Oral Daily    dabigatran etexilate (PRADAXA) capsule 150 mg  150 mg Oral Q12H [address]    dicyclomine (BENTYL) tablet 20 mg  20 mg Oral 4x Daily (AC & HS)    famotidine (PEPCID) injection 20 mg  20 mg Intravenous Q24H MARY    furosemide (LASIX) injection 60 mg  60 mg Intravenous BID    HYDROmorphone (DILAUDID) injection 0 5 mg  0 5 mg Intravenous Q4H PRN    hydrOXYzine HCL (ATARAX) tablet 25 mg  25 mg Oral TID PRN    levothyroxine tablet 25 mcg  25 mcg Oral Early Morning    metoclopramide (REGLAN) tablet 10 mg  10 mg Oral TID AC    metoprolol tartrate (LOPRESSOR) tablet 50 mg  50 mg Oral Q12H MARY    nystatin (MYCOSTATIN) cream   Topical BID    ondansetron (ZOFRAN) injection 4 mg  4 mg Intravenous Q6H PRN    oxyCODONE (ROXICODONE) IR tablet 2 5 mg  2 5 mg Oral Q6H PRN    oxyCODONE (ROXICODONE) IR tablet 5 mg  5 mg Oral Q6H PRN    pantoprazole (PROTONIX) 80 mg in sodium chloride 0 9 % 100 mL infusion  8 mg/hr Intravenous Continuous    sodium chloride (OCEAN) 0 65 % nasal spray 1 spray  1 spray Each Nare Q2H PRN    spironolactone (ALDACTONE) tablet 50 mg  50 mg Oral Daily     pantoprozole (PROTONIX) infusion (Continuous), 8 mg/hr, Last Rate: 8 mg/hr (03/16/22 0135)      EKG personally reviewed by RADHA Mayorga    Assessment:  Principal Problem:    Intractable abdominal pain  Active Problems: Morbid obesity ([address] )    Obesity hypoventilation syndrome/FILIPPO    Acute on chronic diastolic congestive heart failure (HCC)    Atrial fibrillation (HCC)    COPD (chronic obstructive pulmonary disease) (HCC)    Hypothyroidism    History of DVT (deep vein thrombosis)    Acute on chronic respiratory failure with hypoxia (HCC)    Counseling / Coordination of Care  Total floor / unit time spent today 20 minutes  Greater than 50% of total time was spent with the patient and / or family counseling and / or coordination of care  ** Please Note: Dragon 360 Dictation voice to text software may have been used in the creation of this document   **

## 2022-03-16 NOTE — ASSESSMENT & PLAN NOTE
· Patient wears 2 L of supplemental oxygen chronically/baseline  POA, requiring 4 L with SpO2 92%  · Suspect secondary to CHF exacerbation  · COVID/Influenza/RSV negative  · Patient on 4 L O2 NC      Plan  · Wean O2 as tolerated to baseline  · On diuresis

## 2022-03-16 NOTE — PROGRESS NOTES
[facility]  Progress Note - Gina Moment 1970, 47 y o  male MRN: [id number]  Unit/Bed#: W -10 Encounter: [id number]  Primary Care Provider: Guerita Kaba MD   Date and time admitted to hospital: 3/13/2022  9:24 PM    * Intractable abdominal pain  Assessment & Plan  · Presentation: complaints of dull, aching generalized abdominal pain for 2 days  He denies radiation of the pain  He reports associated symptoms of nausea and vomiting  He denies fever, chills, diarrhea, constipation or urinary symptoms  · Unable to obtain CT imaging/Obstruction series due to large body habitus  · Surgery evaluated patient in ED, it was determined no surgical intervention was needed at this time  · EGD - mid-distal esophagitis  Food retention in gastric cardia  Procedure reported due to risk of aspiration  Suspect food bezoar  Gastroparesis? · Most recent EKG QTc 417    Plan  · Unable to obtain CT AP w IV  Contacted CT team at Cancer center, as they have Bariatric CT  Patient's hip measurement of greater than 80 cm  He is unable to fit in CT bore opening  Out next option would be an out of network scanner  Pending further recommendations by GI team    · Pain control  · Zofran  · IV Protonix  · Reglan Ragini - watch for Tardive dyskinesia   · Clear liq diet   · Gastroenterology consulted, appreciate recommendation    Acute on chronic respiratory failure with hypoxia (HCC)  Assessment & Plan  · Patient wears 2 L of supplemental oxygen chronically/baseline  POA, requiring 4 L with SpO2 92%  · Suspect secondary to CHF exacerbation  · COVID/Influenza/RSV negative  · Patient on 4 L O2 NC  Plan  · Wean O2 as tolerated  · On diuresis  · Respiratory protocol  History of DVT (deep vein thrombosis)  Assessment & Plan    · Cont Pradaxa    Hypothyroidism  Assessment & Plan  Plan  · Continue levothyroxine      COPD (chronic obstructive pulmonary disease) (HCC)  Assessment & Plan  · Not in an acute exacerbation  Plan  · Continue home regimen of Symbicort  · Respiratory protocol  Atrial fibrillation (HCC)  Assessment & Plan  Plan  · Rate/Rhythm Control: Metoprolol tartrate  Antiplatelet/Anticoagulation: Pradaxa - continued, cleared by GI team     Acute on chronic diastolic congestive heart failure [facility])  Assessment & Plan  Wt Readings from Last 3 Encounters:   03/16/22 (!) 256 kg (565 lb 0 6 oz)   02/22/22 (!) 242 kg (534 lb)   02/18/22 (!) 242 kg (534 lb 6 3 oz)    Chest x-ray: Large body habitus, unremarkable  Last ECHO on file from July 4898: LV systolic function was normal  Ejection fraction was estimated to be 60%  BNP: 752    Plan  Continue with IV Lasix 60 mg bid  Continue Metoprolol tartrate 50 mg b i d    Monitor intake and output, daily weights  Cardiology following, recommendations appreciated    Obesity hypoventilation syndrome/FILIPPO  Assessment & Plan  · CPAP HS    Morbid obesity ([address] )  Assessment & Plan  · Encouraged lifestyle modifications  VTE Pharmacologic Prophylaxis:   VTE Score: 4 Moderate Risk (Score 3-4) - Pharmacological DVT Prophylaxis Ordered: Dabigatran (Pradaxa)  Mechanical VTE Prophylaxis in Place: No    Patient Centered Rounds: I have performed bedside rounds with nursing staff today  Discussions with Specialists or Other Care Team Provider:  Cardiology, Gastroenterology    Education and Discussions with Family / Patient: Updated  (significant other) at bedside  Current Length of Stay: 0 day(s)    Current Patient Status: Inpatient     Discharge Plan / Estimated Discharge Date: Anticipate discharge tomorrow to home with home services  Code Status: Level 1 - Full Code      Subjective:   Nursing team reported no overnight events  Patient cont to report epigastric and periumbilical pain, nausea without vomiting  Patient reports voiding urine  Patient denied headache, shortness of breath, lower extremity pain   No recent BM, however, patient has not had much solid food consumption, has been NPO or on CL diet  Objective:     Vitals:   Temp (24hrs), Av 3 °F (36 8 °C), Min:97 6 °F (36 4 °C), Max:98 9 °F (37 2 °C)    Temp:  [97 6 °F (36 4 °C)-98 9 °F (37 2 °C)] 98 9 °F (37 2 °C)  HR:  [102-123] 116  Resp:  [18-20] 18  BP: (110-129)/(61-71) 117/71  SpO2:  [89 %-91 %] 89 %  Body mass index is 81 07 kg/m²  Input and Output Summary (last 24 hours): Intake/Output Summary (Last 24 hours) at 3/16/2022 1700  Last data filed at 3/16/2022 1606  Gross per 24 hour   Intake 1240 ml   Output 2175 ml   Net -935 ml       Physical Exam:     Physical Exam  Vitals and nursing note reviewed  Constitutional:       General: He is not in acute distress  Appearance: He is morbidly obese  He is ill-appearing  He is not diaphoretic  HENT:      Head: Normocephalic and atraumatic  Mouth/Throat:      Mouth: Mucous membranes are moist       Pharynx: Oropharynx is clear  Eyes:      General: No scleral icterus  Conjunctiva/sclera: Conjunctivae normal    Cardiovascular:      Rate and Rhythm: Regular rhythm  Tachycardia present  Pulses: Normal pulses  Heart sounds: No murmur heard  No gallop  Pulmonary:      Effort: Pulmonary effort is normal  No respiratory distress  Breath sounds: Rales ( appreciated mildly at bilateral mid axillary line, difficult to assess due to body habitus) present  No wheezing  Abdominal:      General: Bowel sounds are normal  There is no distension  Palpations: Abdomen is soft  There is no mass  Tenderness: There is abdominal tenderness ( at epigastrium and periumbilical area)  Comments: Unable to appreciate bowel sounds, likely due to body habitus   Musculoskeletal:         General: No tenderness  Normal range of motion  Cervical back: Normal range of motion and neck supple  No tenderness  Right lower leg: No edema  Left lower leg: No edema     Skin:     General: Skin is warm and dry  Capillary Refill: Capillary refill takes less than 2 seconds  Findings: Rash (under skin folds) present  Rash is macular (Under skin folds)  Comments: Skin breakage at left inguinal skin fold   Neurological:      General: No focal deficit present  Mental Status: He is alert and oriented to person, place, and time  Mental status is at baseline  Sensory: No sensory deficit  Psychiatric:         Mood and Affect: Mood is anxious  Behavior: Behavior normal          Thought Content: Thought content normal          Judgment: Judgment normal           Additional Data:     Labs:  Results from last 7 days   Lab Units 03/16/22  0529 03/15/22  1109 03/15/22  0612   WBC Thousand/uL 5 15  --  8 73   HEMOGLOBIN g/dL 12 3   < > 13 2   HEMATOCRIT % 39 9   < > 42 3   PLATELETS Thousands/uL 178  --  180   NEUTROS PCT %  --   --  84*   LYMPHS PCT %  --   --  7*   MONOS PCT %  --   --  7   EOS PCT %  --   --  0    < > = values in this interval not displayed  Results from last 7 days   Lab Units 03/16/22  0529 03/15/22  0612 03/15/22  0612 03/14/22  0624 03/13/22 2211   SODIUM mmol/L 146*   < > 147*   < > 143   POTASSIUM mmol/L 3 9   < > 4 0   < > 4 2   CHLORIDE mmol/L 98*   < > 102   < > 98*   CO2 mmol/L >45*   < > 43*   < > 45*   BUN mg/dL 19   < > 17   < > 16   CREATININE mg/dL 0 92   < > 0 92   < > 0 99   ANION GAP mmol/L  --   --  2*   < > 0*   CALCIUM mg/dL 9 2   < > 9 2   < > 9 5   ALBUMIN g/dL  --   --   --   --  3 2*   TOTAL BILIRUBIN mg/dL  --   --   --   --  0 69   ALK PHOS U/L  --   --   --   --  88   ALT U/L  --   --   --   --  26   AST U/L  --   --   --   --  16   GLUCOSE RANDOM mg/dL 125   < > 134   < > 165*    < > = values in this interval not displayed       Results from last 7 days   Lab Units 03/13/22  2211   INR  1 01     Results from last 7 days   Lab Units 03/14/22  1639   POC GLUCOSE mg/dl 156*         Results from last 7 days   Lab Units 03/13/22  2211   LACTIC ACID mmol/L 0 7       Imaging: Reviewed radiology reports from this admission including: chest xray    Recent Cultures (last 7 days):           Lines/Drains:  Invasive Devices  Report    Peripheral Intravenous Line            Peripheral IV 03/15/22 Left;Ventral (anterior) Forearm 1 day                Telemetry:        Last 24 Hours Medication List:   Current Facility-Administered Medications   Medication Dose Route Frequency Provider Last Rate    acetaminophen  650 mg Oral Q6H PRN Donzell Altes, CRNP      albuterol  2 5 mg Nebulization Q6H PRN Brayden Funez MD      aluminum-magnesium hydroxide-simethicone  30 mL Oral Q6H PRN Donzell Altes, CRNP      ammonium lactate   Topical BID Donzell Altes, CRNP      budesonide-formoterol  2 puff Inhalation BID Donzell Altes, CRNP      buPROPion  450 mg Oral Daily Donzell Altes, CRNP      cholecalciferol  2,000 Units Oral Daily Donzell Altes, CRNP      dabigatran etexilate  150 mg Oral Q12H [address] Darling Abdullahi MD      dicyclomine  20 mg Oral 4x Daily (AC & HS) Donzell Altes, CRNP      famotidine  20 mg Intravenous Q24H [address] RADHA Robbins      furosemide  60 mg Intravenous BID Donzell Altes, CRNP      HYDROmorphone  0 5 mg Intravenous Q4H PRN Donzell Altes, CRNP      hydrOXYzine HCL  25 mg Oral TID PRN Donzell Altes, CRNP      levothyroxine  25 mcg Oral Early Morning Donzell Altes, CRNP      metoclopramide  10 mg Oral TID [facility] Darling Abdullahi MD      metoprolol tartrate  50 mg Oral Q12H [address] RADHA Hitchcock      ondansetron  4 mg Intravenous Q6H PRN Donzell Altes, CRNP      oxyCODONE  2 5 mg Oral Q6H PRN Donzell Altes, CRNP      oxyCODONE  5 mg Oral Q6H PRN Donzell Altes, CRNP      pantoprozole (PROTONIX) infusion (Continuous)  8 mg/hr Intravenous Continuous Asiya Balderrama MD 8 mg/hr (03/16/22 1637)    sodium chloride  1 spray Each Nare Q2H PRN Donzell Altes, CRNP      spironolactone  50 mg Oral Daily RADHA Willoughby          Today, Patient Was Seen By: Lawrence Santacruz MD    ** Please Note: This note has been constructed using a voice recognition system   **

## 2022-03-16 NOTE — ASSESSMENT & PLAN NOTE
Plan  · Rate/Rhythm Control: Metoprolol tartrate    Antiplatelet/Anticoagulation: Pradaxa - continued, cleared by GI team

## 2022-03-17 LAB
ANION GAP SERPL CALCULATED.3IONS-SCNC: 2 MMOL/L (ref 4–13)
BUN SERPL-MCNC: 16 MG/DL (ref 5–25)
CALCIUM SERPL-MCNC: 9 MG/DL (ref 8.3–10.1)
CHLORIDE SERPL-SCNC: 94 MMOL/L (ref 100–108)
CO2 SERPL-SCNC: 44 MMOL/L (ref 21–32)
CREAT SERPL-MCNC: 0.81 MG/DL (ref 0.6–1.3)
GFR SERPL CREATININE-BSD FRML MDRD: 102 ML/MIN/1.73SQ M
GLUCOSE SERPL-MCNC: 114 MG/DL (ref 65–140)
MAGNESIUM SERPL-MCNC: 2.1 MG/DL (ref 1.6–2.6)
POTASSIUM SERPL-SCNC: 3.5 MMOL/L (ref 3.5–5.3)
SODIUM SERPL-SCNC: 140 MMOL/L (ref 136–145)

## 2022-03-17 PROCEDURE — 99232 SBSQ HOSP IP/OBS MODERATE 35: CPT | Performed by: NURSE PRACTITIONER

## 2022-03-17 PROCEDURE — 80048 BASIC METABOLIC PNL TOTAL CA: CPT

## 2022-03-17 PROCEDURE — 83735 ASSAY OF MAGNESIUM: CPT

## 2022-03-17 PROCEDURE — 99232 SBSQ HOSP IP/OBS MODERATE 35: CPT | Performed by: HOSPITALIST

## 2022-03-17 PROCEDURE — C9113 INJ PANTOPRAZOLE SODIUM, VIA: HCPCS | Performed by: INTERNAL MEDICINE

## 2022-03-17 PROCEDURE — 94640 AIRWAY INHALATION TREATMENT: CPT

## 2022-03-17 PROCEDURE — 99232 SBSQ HOSP IP/OBS MODERATE 35: CPT | Performed by: INTERNAL MEDICINE

## 2022-03-17 PROCEDURE — 94760 N-INVAS EAR/PLS OXIMETRY 1: CPT

## 2022-03-17 RX ORDER — PANTOPRAZOLE SODIUM 40 MG/1
40 TABLET, DELAYED RELEASE ORAL
Status: DISCONTINUED | OUTPATIENT
Start: 2022-03-17 | End: 2022-03-18 | Stop reason: HOSPADM

## 2022-03-17 RX ORDER — DOCUSATE SODIUM 100 MG/1
100 CAPSULE, LIQUID FILLED ORAL 2 TIMES DAILY
Status: DISCONTINUED | OUTPATIENT
Start: 2022-03-17 | End: 2022-03-18 | Stop reason: HOSPADM

## 2022-03-17 RX ORDER — POLYETHYLENE GLYCOL 3350 17 G/17G
17 POWDER, FOR SOLUTION ORAL DAILY
Status: DISCONTINUED | OUTPATIENT
Start: 2022-03-17 | End: 2022-03-18 | Stop reason: HOSPADM

## 2022-03-17 RX ADMIN — OXYCODONE HYDROCHLORIDE 2.5 MG: 5 TABLET ORAL at 06:30

## 2022-03-17 RX ADMIN — SODIUM CHLORIDE 8 MG/HR: 9 INJECTION, SOLUTION INTRAVENOUS at 03:00

## 2022-03-17 RX ADMIN — Medication 2000 UNITS: at 10:47

## 2022-03-17 RX ADMIN — SPIRONOLACTONE 50 MG: 25 TABLET ORAL at 10:47

## 2022-03-17 RX ADMIN — DOCUSATE SODIUM 100 MG: 100 CAPSULE, LIQUID FILLED ORAL at 18:51

## 2022-03-17 RX ADMIN — FUROSEMIDE 60 MG: 10 INJECTION, SOLUTION INTRAMUSCULAR; INTRAVENOUS at 19:06

## 2022-03-17 RX ADMIN — DABIGATRAN ETEXILATE MESYLATE 150 MG: 150 CAPSULE ORAL at 10:46

## 2022-03-17 RX ADMIN — LEVOTHYROXINE SODIUM 25 MCG: 25 TABLET ORAL at 06:26

## 2022-03-17 RX ADMIN — Medication: at 18:50

## 2022-03-17 RX ADMIN — BUDESONIDE AND FORMOTEROL FUMARATE DIHYDRATE 2 PUFF: 160; 4.5 AEROSOL RESPIRATORY (INHALATION) at 18:50

## 2022-03-17 RX ADMIN — FAMOTIDINE 20 MG: 10 INJECTION INTRAVENOUS at 10:47

## 2022-03-17 RX ADMIN — HYDROMORPHONE HYDROCHLORIDE 0.5 MG: 1 INJECTION, SOLUTION INTRAMUSCULAR; INTRAVENOUS; SUBCUTANEOUS at 22:13

## 2022-03-17 RX ADMIN — METOCLOPRAMIDE 10 MG: 10 TABLET ORAL at 10:58

## 2022-03-17 RX ADMIN — DICYCLOMINE HYDROCHLORIDE 20 MG: 20 TABLET ORAL at 21:11

## 2022-03-17 RX ADMIN — ALBUTEROL SULFATE 2.5 MG: 2.5 SOLUTION RESPIRATORY (INHALATION) at 16:43

## 2022-03-17 RX ADMIN — DICYCLOMINE HYDROCHLORIDE 20 MG: 20 TABLET ORAL at 15:57

## 2022-03-17 RX ADMIN — BUPROPION HYDROCHLORIDE 450 MG: 150 TABLET, FILM COATED, EXTENDED RELEASE ORAL at 10:47

## 2022-03-17 RX ADMIN — OXYCODONE HYDROCHLORIDE 5 MG: 5 TABLET ORAL at 21:11

## 2022-03-17 RX ADMIN — OXYCODONE HYDROCHLORIDE 2.5 MG: 5 TABLET ORAL at 16:13

## 2022-03-17 RX ADMIN — DABIGATRAN ETEXILATE MESYLATE 150 MG: 150 CAPSULE ORAL at 21:11

## 2022-03-17 RX ADMIN — PANTOPRAZOLE SODIUM 40 MG: 40 TABLET, DELAYED RELEASE ORAL at 10:58

## 2022-03-17 RX ADMIN — DOCUSATE SODIUM 100 MG: 100 CAPSULE, LIQUID FILLED ORAL at 10:48

## 2022-03-17 RX ADMIN — POLYETHYLENE GLYCOL 3350 17 G: 17 POWDER, FOR SOLUTION ORAL at 10:46

## 2022-03-17 RX ADMIN — METOPROLOL TARTRATE 50 MG: 50 TABLET, FILM COATED ORAL at 10:47

## 2022-03-17 RX ADMIN — FUROSEMIDE 60 MG: 10 INJECTION, SOLUTION INTRAMUSCULAR; INTRAVENOUS at 10:46

## 2022-03-17 RX ADMIN — Medication: at 11:00

## 2022-03-17 RX ADMIN — HYDROMORPHONE HYDROCHLORIDE 0.5 MG: 1 INJECTION, SOLUTION INTRAMUSCULAR; INTRAVENOUS; SUBCUTANEOUS at 10:53

## 2022-03-17 RX ADMIN — DICYCLOMINE HYDROCHLORIDE 20 MG: 20 TABLET ORAL at 10:58

## 2022-03-17 RX ADMIN — METOPROLOL TARTRATE 50 MG: 50 TABLET, FILM COATED ORAL at 21:11

## 2022-03-17 RX ADMIN — DICYCLOMINE HYDROCHLORIDE 20 MG: 20 TABLET ORAL at 06:26

## 2022-03-17 RX ADMIN — BUDESONIDE AND FORMOTEROL FUMARATE DIHYDRATE 2 PUFF: 160; 4.5 AEROSOL RESPIRATORY (INHALATION) at 11:00

## 2022-03-17 RX ADMIN — METOCLOPRAMIDE 10 MG: 10 TABLET ORAL at 15:57

## 2022-03-17 RX ADMIN — METOCLOPRAMIDE 10 MG: 10 TABLET ORAL at 06:26

## 2022-03-17 NOTE — PROGRESS NOTES
General Cardiology   Progress Note -  [facility] One   Jayro Pruitt 47 y o  male MRN: [id number]    Unit/Bed#: W -01 Encounter: [id number]    Assessment  1  Epigastric/abdominal pain w/hematemesis  -Management per the GI service    -Symptoms appear to be improving  -EGD 3/15; mid and distal esophagitis  2  Acute on chronic diastolic CHF  -Difficult to assess volume status given body habitus   -Subjectively states his breathing is about 75% improved since admission and gradually improving even since yesterday, but still not at baseline  -X-ray imaging - possible pulmonary vascular congestion - though limited study given his body habitus  -NT proBNP level - 752 (previously 607 - in February 2022)  -Previously on oral furosemide 80 mg b i d  (oral diuretics currently on)  -Currently on IV furosemide 60 mg b i d   -24 hour I&O balance; -1 1 L; overall -2 3 L  -Difficult to ascertain his true baseline dry weights  Recent weight at his nursing home facility via [address] was 534 lbs  3  Permanent atrial fibrillation   -12 lead ECG 3/13; atrial fibrillation, rate controlled   -Non telemetry currently  -On metoprolol tartrate 50 mg q 12 hours  -Previously on Pradaxa 150 mg b i d  - currently on hold see # 1  4  Essential hypertension  -Average /75, last recorded 132/76,     -Outpatient BP regimen; furosemide 80 mg BID, and metoprolol tartrate 50 mg q 12 hours  -Inpatient BP regimen:  Metoprolol tartrate 50 mg q 12 hours, and IV furosemide 60 mg b i d   5  DVT s/p IVC filter, and previously on Pradaxa 150 mg b i d (currently on hold see #1)  -Venous duplex study 3/14/2022; no evidence of acute or chronic DVT in the right or left lower limb  6  Morbid obesity; BMI 81  7   OHS /  acute on chronic hypoxic/hypercapnic respiratory failure - chronically wears 2 L of supplemental O2 at baseline, currently requiring 4 L of supplemental oxygen sats in the low to mid 90s   8  FILIPPO- on CPAP at [facility] diuresis today - on IV Lasix 60 mg b i d   It appears the patient is having an adequate urinary response and respiratory status is gradually improving with the current dose of IV Lasix  Renal function also remains stable  Volume status and obtaining accurate weights are difficult to assess given his body habitus /inability to stand and use of the bed scale for weights     -Continue metoprolol tartrate 50 mg q 12 hours   -Resume oral anticoagulation with Pradaxa 150 mg b i d  - discussed this with GI   -Strict I&Os, daily weights, 2 g NA +diet 1 8 L FR   -Monitor renal function electrolytes closely   Replete to maintain K +level of 4 0, magnesium 2 0   -Pt needs extensive diet/lifestyle modifications and weight loss  -No indication for telemetry monitoring  Subjective  Review of Systems   Constitutional: Positive for malaise/fatigue  Negative for chills and fever  Eyes: Negative for visual disturbance  Cardiovascular: Positive for leg swelling  Negative for chest pain, dyspnea on exertion, orthopnea and palpitations  Respiratory: Negative for cough and shortness of breath  Gastrointestinal: Negative for abdominal pain  Neurological: Negative for dizziness, headaches and light-headedness  Objective:   Physical Exam  Vitals and nursing note reviewed  Constitutional:       General: He is not in acute distress  Appearance: He is obese  He is not diaphoretic  HENT:      Head: Normocephalic and atraumatic  Mouth/Throat:      Mouth: Mucous membranes are moist    Eyes:      General: No scleral icterus  Cardiovascular:      Rate and Rhythm: Normal rate and regular rhythm  Pulses: Normal pulses  Heart sounds: Normal heart sounds  No murmur heard  Pulmonary:      Effort: Pulmonary effort is normal       Breath sounds: No wheezing or rales  Comments: Lungs are diminished bilaterally  Abdominal:      General: Bowel sounds are normal       Palpations: Abdomen is soft  Tenderness: There is no abdominal tenderness  Comments: Morbidly obese abdomen  Musculoskeletal:         General: Swelling present  Cervical back: Neck supple  Right lower leg: Edema present  Left lower leg: Edema present  Skin:     General: Skin is warm and dry  Capillary Refill: Capillary refill takes less than 2 seconds  Neurological:      General: No focal deficit present  Mental Status: He is alert and oriented to person, place, and time  Psychiatric:         Mood and Affect: Mood normal        Vitals: Blood pressure 132/76, pulse (!) 112, temperature 98 2 °F (36 8 °C), resp  rate 20, height 5' 10" (1 778 m), weight (!) 256 kg (564 lb 9 6 oz), SpO2 94 %  ,     Body mass index is 81 01 kg/m²  ,   Systolic (01PHI), OZR:856 , Min:116 , EEC:091     Diastolic (37IHG), DUO:84, Min:71, Max:82      Intake/Output Summary (Last 24 hours) at 3/17/2022 1056  Last data filed at 3/17/2022 0600  Gross per 24 hour   Intake 153 16 ml   Output 1300 ml   Net -1146 84 ml     Weight (last 2 days)     Date/Time Weight    03/17/22 0544 256 (564 6)    03/16/22 0546 256 (565 04)    03/15/22 0600 256 (565)          LABORATORY RESULTS      CBC with diff:   Results from last 7 days   Lab Units 03/16/22  0529 03/15/22  1109 03/15/22  0612 03/14/22  2120 03/14/22  0624 03/13/22  2211   WBC Thousand/uL 5 15  --  8 73  --  11 37* 9 41   HEMOGLOBIN g/dL 12 3 12 7 13 2 13 5 14 2 13 0   HEMATOCRIT % 39 9 40 8 42 3 42 0 45 7 39 9   MCV fL 95  --  94  --  95 92   PLATELETS Thousands/uL 178  --  180  --  202 175   MCH pg 29 3  --  29 4  --  29 5 29 9   MCHC g/dL 30 8*  --  31 2*  --  31 1* 32 6   RDW % 14 6  --  14 7  --  14 6 14 7   MPV fL 10 3  --  10 4  --  10 1 10 1   NRBC AUTO /100 WBCs  --   --  0  --  0 0       CMP:  Results from last 7 days   Lab Units 03/17/22  0528 03/16/22  0529 03/15/22  0612 03/14/22  0624 03/13/22  2211   POTASSIUM mmol/L 3 5 3 9 4 0 4 8 4 2   CHLORIDE mmol/L 94* 98* 102 98* 98* CO2 mmol/L 44* >45* 43* 40* 45*   BUN mg/dL 16 19 17 16 16   CREATININE mg/dL 0 81 0 92 0 92 0 97 0 99   CALCIUM mg/dL 9 0 9 2 9 2 9 7 9 5   AST U/L  --   --   --   --  16   ALT U/L  --   --   --   --  26   ALK PHOS U/L  --   --   --   --  88   EGFR ml/min/1 73sq m 102 95 95 90 87       BMP:  Results from last 7 days   Lab Units 22  0528 22  0529 03/15/22  0612 22  0624 22  2211   POTASSIUM mmol/L 3 5 3 9 4 0 4 8 4 2   CHLORIDE mmol/L 94* 98* 102 98* 98*   CO2 mmol/L 44* >45* 43* 40* 45*   BUN mg/dL 16 19 17 16 16   CREATININE mg/dL 0 81 0 92 0 92 0 97 0 99   CALCIUM mg/dL 9 0 9 2 9 2 9 7 9 5       Lab Results   Component Value Date    NTBNP 752 (H) 2022    NTBNP 607 (H) 2022    NTBNP 1,058 (H) 2021        Results from last 7 days   Lab Units 22  0528   MAGNESIUM mg/dL 2 1                   Results from last 7 days   Lab Units 22  2211   INR  1 01       Lipid Profile:   No results found for: CHOL  Lab Results   Component Value Date    HDL 44 2016     Lab Results   Component Value Date    LDLCALC 80 2016     Lab Results   Component Value Date    TRIG 197 (H) 2016       Cardiac testing:   Results for orders placed during the hospital encounter of 05/10/21    Echo complete with contrast if indicated    Narrative  [address] [place] [address]    Transthoracic Echocardiogram  2D, M-mode, Doppler, and Color Doppler    Study date:  11-May-2021    Patient: Emily Silva  MR number: [id number]  Account number: [de-identified]  : 1970  Age: 49 years  Gender: Male  Status: Outpatient  Location: [facility]  Height: 70 in  Weight: 409 2 lb  BP: 121/ 53 mmHg    Indications: Heart failure    Diagnoses: I50 9 - Heart failure, unspecified    Sonographer:  JOSE Ramos  Referring Physician:  Wellington Lau MD  Group:  [facility]  Interpreting Physician:  DANG Rajinder Rubi MD    SUMMARY    PROCEDURE INFORMATION:  This was a technically difficult study  LEFT VENTRICLE:  Systolic function was normal  Ejection fraction was estimated to be 60 %  This study was inadequate for the evaluation of regional wall motion  HISTORY: PRIOR HISTORY: afib, CHF, morbid obesity, DVT, COPD, DM, hypothyroid, MRSA    PROCEDURE: The study was performed in the [facility]  This was a routine study  The transthoracic approach was used  The study included complete 2D imaging, M-mode, complete spectral Doppler, and color Doppler  The heart rate was 86  bpm, at the start of the study  Images were obtained from the parasternal, apical, subcostal, and suprasternal notch acoustic windows  Echocardiographic views were limited due to restricted patient mobility, poor acoustic window  availability, decreased penetration, and lung interference  This was a technically difficult study  LEFT VENTRICLE: Size was normal  Systolic function was normal  Ejection fraction was estimated to be 60 %  This study was inadequate for the evaluation of regional wall motion  Wall thickness could not be accurately determined  DOPPLER:  The study was not technically sufficient to allow evaluation of LV diastolic function  RIGHT VENTRICLE: Systolic function was normal  Not well visualized  LEFT ATRIUM: Not well visualized  RIGHT ATRIUM: Not well visualized  MITRAL VALVE: Not well visualized  DOPPLER: The transmitral velocity was within the normal range  There was trace regurgitation  AORTIC VALVE: The valve was probably trileaflet  The valve was not well visualized  DOPPLER: Transaortic velocity was within the normal range  There was no evidence for stenosis  TRICUSPID VALVE: Not well visualized  DOPPLER: The transtricuspid velocity was within the normal range  There was trace regurgitation  Pulmonary artery systolic pressure was within the normal range   Estimated peak PA pressure was 27 mmHg     PULMONIC VALVE: Not well visualized  DOPPLER: The transpulmonic velocity was within the normal range  There was trace regurgitation  PERICARDIUM: Not well visualized  AORTA: Not well visualized  SYSTEM MEASUREMENT TABLES    2D  Ao Diam: 3 11 cm  LA Diam: 4 34 cm    CW  AV Vmax: 1 22 m/s  AV maxP 91 mmHg  PV Vmax: 1 13 m/s  PV maxP 13 mmHg  TR Vmax: 2 72 m/s  TR maxP 6 mmHg    [facility]    Prepared and electronically signed by    Teresa Varela MD  Signed 11-May-2021 11:37:20    No results found for this or any previous visit  No results found for this or any previous visit  No valid procedures specified  No results found for this or any previous visit        Meds/Allergies   all current active meds have been reviewed and current meds:   Current Facility-Administered Medications   Medication Dose Route Frequency    acetaminophen (TYLENOL) tablet 650 mg  650 mg Oral Q6H PRN    albuterol inhalation solution 2 5 mg  2 5 mg Nebulization Q6H PRN    aluminum-magnesium hydroxide-simethicone (MYLANTA) oral suspension 30 mL  30 mL Oral Q6H PRN    ammonium lactate (LAC-HYDRIN) 12 % cream   Topical BID    budesonide-formoterol (SYMBICORT) 160-4 5 mcg/act inhaler 2 puff  2 puff Inhalation BID    buPROPion (WELLBUTRIN XL) 24 hr tablet 450 mg  450 mg Oral Daily    cholecalciferol (VITAMIN D3) tablet 2,000 Units  2,000 Units Oral Daily    dabigatran etexilate (PRADAXA) capsule 150 mg  150 mg Oral Q12H [address]    dicyclomine (BENTYL) tablet 20 mg  20 mg Oral 4x Daily (AC & HS)    docusate sodium (COLACE) capsule 100 mg  100 mg Oral BID    famotidine (PEPCID) injection 20 mg  20 mg Intravenous Q24H MARY    furosemide (LASIX) injection 60 mg  60 mg Intravenous BID    HYDROmorphone (DILAUDID) injection 0 5 mg  0 5 mg Intravenous Q4H PRN    hydrOXYzine HCL (ATARAX) tablet 25 mg  25 mg Oral TID PRN    levothyroxine tablet 25 mcg  25 mcg Oral Early Morning    metoclopramide (REGLAN) tablet 10 mg  10 mg Oral TID AC    metoprolol tartrate (LOPRESSOR) tablet 50 mg  50 mg Oral Q12H [facility]    ondansetron (ZOFRAN) injection 4 mg  4 mg Intravenous Q6H PRN    oxyCODONE (ROXICODONE) IR tablet 2 5 mg  2 5 mg Oral Q6H PRN    oxyCODONE (ROXICODONE) IR tablet 5 mg  5 mg Oral Q6H PRN    pantoprazole (PROTONIX) EC tablet 40 mg  40 mg Oral Early Morning    polyethylene glycol (MIRALAX) packet 17 g  17 g Oral Daily    sodium chloride (OCEAN) 0 65 % nasal spray 1 spray  1 spray Each Nare Q2H PRN    spironolactone (ALDACTONE) tablet 50 mg  50 mg Oral Daily          EKG personally reviewed by RADHA Peters    Assessment:  Principal Problem:    Intractable abdominal pain  Active Problems: Morbid obesity ([address] )    Obesity hypoventilation syndrome/FILIPPO    Acute on chronic diastolic congestive heart failure (HCC)    Atrial fibrillation (HCC)    COPD (chronic obstructive pulmonary disease) (HCC)    Hypothyroidism    History of DVT (deep vein thrombosis)    Acute on chronic respiratory failure with hypoxia (HCC)    Counseling / Coordination of Care  Total floor / unit time spent today 20 minutes  Greater than 50% of total time was spent with the patient and / or family counseling and / or coordination of care  ** Please Note: Dragon 360 Dictation voice to text software may have been used in the creation of this document   **

## 2022-03-17 NOTE — PROGRESS NOTES
Progress Note - Devonte Hester 47 y o  male MRN: [id number]    Unit/Bed#: W -01 Encounter: [id number]    Assessment and Plan:     1  Abdominal pain with nausea and vomiting  EGD performed showing esophagitis with large amount of food retained in the stomach  Likely food bezoar in the setting of gastroparesis  Unable to get any imaging performed here due to BMI  Does report improvement in symptoms today  Passing gas  No vomiting     -unfortunately, unable to obtain any imaging including GI series or CT scan due to BMI  Hip measurement is also larger than bariatric scan allows   -continue clear liquid diet   -added on MiraLax and stool softeners to help aid in bowel movement   -continue Reglan     -continue monitor abdominal exam     -per primary team, there is no bariatrics scanner within our network that allows patient's BMI  If patient gets readmitted in the future would recommend he be referred to a facility that is capable of performing imaging on him     -discussed with Cardiology  Okay to resume anticoagulation   -consider gastric emptying scan in the outpatient setting  Low-fiber/low residue diet       ----------------------------------------------------------------------------------------------------------------    Subjective:     Patient reports he is feeling a little better  He reports he is passing gas however has not had a bowel movement yet  Reports some nausea but denies any vomiting  Tolerating liquids  Objective:     Vitals: Blood pressure 132/76, pulse (!) 112, temperature 98 2 °F (36 8 °C), resp  rate 20, height 5' 10" (1 778 m), weight (!) 256 kg (564 lb 9 6 oz), SpO2 94 %  ,Body mass index is 81 01 kg/m²  Intake/Output Summary (Last 24 hours) at 3/17/2022 1204  Last data filed at 3/17/2022 1125  Gross per 24 hour   Intake 207 33 ml   Output 1300 ml   Net -1092 67 ml       Physical Exam:     General Appearance: Alert, appears stated age and cooperative    Lying comfortably in bed  Does not appear in distress  Lungs: Clear to auscultation bilaterally, no rales or rhonchi, no labored breathing/accessory muscle use  Heart: Regular rate and rhythm, S1, S2 normal, no murmur, click, rub or gallop  Abdomen: + abdominal hernias noted  Reports the same tenderness to palpation in the upper abdomen  Bowel sounds present  Extremities: No cyanosis, clubbing, or edema    Invasive Devices  Report    Peripheral Intravenous Line            Peripheral IV 03/15/22 Left;Ventral (anterior) Forearm 2 days                Lab Results:  Results from last 7 days   Lab Units 03/16/22  0529 03/15/22  1109 03/15/22  0612   WBC Thousand/uL 5 15  --  8 73   HEMOGLOBIN g/dL 12 3   < > 13 2   HEMATOCRIT % 39 9   < > 42 3   PLATELETS Thousands/uL 178  --  180   NEUTROS PCT %  --   --  84*   LYMPHS PCT %  --   --  7*   MONOS PCT %  --   --  7   EOS PCT %  --   --  0    < > = values in this interval not displayed  Results from last 7 days   Lab Units 03/17/22  0528 03/14/22  0624 03/13/22  2211   POTASSIUM mmol/L 3 5   < > 4 2   CHLORIDE mmol/L 94*   < > 98*   CO2 mmol/L 44*   < > 45*   BUN mg/dL 16   < > 16   CREATININE mg/dL 0 81   < > 0 99   CALCIUM mg/dL 9 0   < > 9 5   ALK PHOS U/L  --   --  88   ALT U/L  --   --  26   AST U/L  --   --  16    < > = values in this interval not displayed  Invalid input(s): BILI  Results from last 7 days   Lab Units 03/13/22  2211   INR  1 01     Results from last 7 days   Lab Units 03/13/22  2211   LIPASE u/L 228       Imaging Studies: I have personally reviewed pertinent imaging studies  EGD    Result Date: 3/15/2022  Impression: Mid and distal esophagitis, scope was advanced to the gastric cardia were large amount of food was retained  The procedure was aborted at that point due to risk of aspiration    There was a copious amount of food up to his gastric cardia Suspect food bezoar RECOMMENDATION: Follow up with me in clinic  Return to floor Recommend Reglan Recommend clear liquid diet Check upper GI series  Anup Shin MD

## 2022-03-17 NOTE — PROGRESS NOTES
[facility]  Progress Note - Jayro Pruitt 1970, 47 y o  male MRN: [id number]  Unit/Bed#: W -00 Encounter: [id number]  Primary Care Provider: Maryuri Liriano MD   Date and time admitted to hospital: 3/13/2022  9:24 PM    * Intractable abdominal pain  Assessment & Plan  · Presentation: complaints of dull, aching generalized abdominal pain for 2 days  He denies radiation of the pain  He reports associated symptoms of nausea and vomiting  He denies fever, chills, diarrhea, constipation or urinary symptoms  · Unable to obtain CT imaging/Obstruction series due to large body habitus  · Surgery evaluated patient in ED, it was determined no surgical intervention was needed at this time  · EGD - mid-distal esophagitis  Food retention in gastric cardia  Procedure reported due to risk of aspiration  Suspect food bezoar  Gastroparesis? · Most recent EKG QTc 417  · Patient reports that experiences abdominal pain, but is responsive to pain regimen  Patient has not had a bowel movement since admission, likely associated with clear liquid diet and recent NPO  Plan  · Unable to obtain CT AP w IV  Contacted CT team at Cancer center, as they have Bariatric CT  Patient's hip measurement of greater than 80 cm  He is unable to fit in CT bore opening  Out next option would be an out of network scanner  · Pain control  · Zofran  · P o  Protonix, IV Pepcid  · Bowel regimen - MiraLax and Colace  · Reglan Ragini - watch for Tardive dyskinesia   · Clear liq diet, advanced as tolerated  · Gastroenterology consulted, appreciate recommendation - gastric emptying scan as outpatient  · If symptoms improve, dispo planning for discharge in 24-48 hours with outpatient follow-up with Gastroenterology  Acute on chronic respiratory failure with hypoxia (HCC)  Assessment & Plan  · Patient wears 2 L of supplemental oxygen chronically/baseline    POA, requiring 4 L with SpO2 92%  · Suspect secondary to CHF exacerbation  · COVID/Influenza/RSV negative  · Patient on 4 L O2 NC  Plan  · Wean O2 as tolerated  · On diuresis  · Respiratory protocol  History of DVT (deep vein thrombosis)  Assessment & Plan  · Cont Pradaxa    Hypothyroidism  Assessment & Plan  Plan  · Continue levothyroxine  COPD (chronic obstructive pulmonary disease) (HCC)  Assessment & Plan  · Not in an acute exacerbation  Plan  · Continue home regimen of Symbicort  · Respiratory protocol  Atrial fibrillation (HCC)  Assessment & Plan  Plan  · Rate/Rhythm Control: Metoprolol tartrate  Antiplatelet/Anticoagulation: Pradaxa - continued, cleared by GI team     Acute on chronic diastolic congestive heart failure [facility])  Assessment & Plan  Wt Readings from Last 3 Encounters:   03/17/22 (!) 256 kg (564 lb 9 6 oz)   02/22/22 (!) 242 kg (534 lb)   02/18/22 (!) 242 kg (534 lb 6 3 oz)    Chest x-ray: Large body habitus, unremarkable  Last ECHO on file from July 1722: LV systolic function was normal  Ejection fraction was estimated to be 60%  BNP: 752    Plan  Continue with IV Lasix 60 mg bid  Continue Metoprolol tartrate 50 mg b i d    Monitor intake and output, daily weights  Cardiology following, recommendations appreciated - plan to switch to p o  Diuretic  Considering torsemide VS bumetanide    Obesity hypoventilation syndrome/FILIPPO  Assessment & Plan  · CPAP HS    Morbid obesity ([address] )  Assessment & Plan  Patient with history of gastric sleeve surgery  · Encouraged lifestyle modifications  · Referral to outpatient weight management program      VTE Pharmacologic Prophylaxis:   VTE Score: 4 Moderate Risk (Score 3-4) - Pharmacological DVT Prophylaxis Ordered: Dabigatran (Pradaxa)  Mechanical VTE Prophylaxis in Place: No    Patient Centered Rounds: I have performed bedside rounds with nursing staff today      Discussions with Specialists or Other Care Team Provider:  Cardiology, Gastroenterology    Education and Discussions with Family / Patient: Attempted to update  (significant other) via phone  Unable to contact  Current Length of Stay: 1 day(s)    Current Patient Status: Inpatient     Discharge Plan / Estimated Discharge Date: Anticipate discharge in 24-48 hours to previous assisted living facility  Code Status: Level 1 - Full Code      Subjective:   Nursing team reported no overnight events  Patient continues to report epigastric and periumbilical pain that is controlled on pain regimen  Patient reports voiding urine  Patient denied headache, nausea and vomiting, shortness of breath, lower extremity pain  Continues without recent BM, however, patient has not had much solid food consumption, has been NPO or on CL diet  Objective:     Vitals:   Temp (24hrs), Av 3 °F (36 8 °C), Min:97 1 °F (36 2 °C), Max:98 9 °F (37 2 °C)    Temp:  [97 1 °F (36 2 °C)-98 9 °F (37 2 °C)] 97 1 °F (36 2 °C)  HR:  [] 97  Resp:  [18-20] 18  BP: (105-132)/(67-82) 105/67  SpO2:  [89 %-94 %] 93 %  Body mass index is 81 01 kg/m²  Input and Output Summary (last 24 hours): Intake/Output Summary (Last 24 hours) at 3/17/2022 1551  Last data filed at 3/17/2022 1434  Gross per 24 hour   Intake 188 ml   Output 1500 ml   Net -1312 ml       Physical Exam:     Physical Exam  Vitals and nursing note reviewed  Constitutional:       General: He is not in acute distress  Appearance: He is morbidly obese  He is ill-appearing  He is not diaphoretic  HENT:      Head: Normocephalic and atraumatic  Mouth/Throat:      Mouth: Mucous membranes are moist       Pharynx: Oropharynx is clear  Eyes:      General: No scleral icterus  Conjunctiva/sclera: Conjunctivae normal    Cardiovascular:      Rate and Rhythm: Regular rhythm  Tachycardia present  Pulses: Normal pulses  Heart sounds: No murmur heard  No gallop  Pulmonary:      Effort: Pulmonary effort is normal  No respiratory distress        Breath sounds: Rales ( appreciated mildly at bilateral mid axillary line, difficult to assess due to body habitus) present  No wheezing  Abdominal:      General: Bowel sounds are normal  There is no distension  Palpations: Abdomen is soft  There is no mass  Tenderness: There is abdominal tenderness ( at epigastrium and periumbilical area)  Comments: Unable to appreciate bowel sounds, likely due to body habitus   Musculoskeletal:         General: No tenderness  Normal range of motion  Cervical back: Normal range of motion and neck supple  No tenderness  Right lower leg: No edema  Left lower leg: No edema  Skin:     General: Skin is warm and dry  Capillary Refill: Capillary refill takes less than 2 seconds  Findings: Rash (under skin folds) present  Rash is macular (Under skin folds)  Comments: Skin breakage at left inguinal skin fold   Neurological:      General: No focal deficit present  Mental Status: He is alert and oriented to person, place, and time  Mental status is at baseline  Sensory: No sensory deficit  Psychiatric:         Mood and Affect: Mood is anxious  Behavior: Behavior normal          Thought Content: Thought content normal          Judgment: Judgment normal           Additional Data:     Labs:  Results from last 7 days   Lab Units 03/16/22  0529 03/15/22  1109 03/15/22  0612   WBC Thousand/uL 5 15  --  8 73   HEMOGLOBIN g/dL 12 3   < > 13 2   HEMATOCRIT % 39 9   < > 42 3   PLATELETS Thousands/uL 178  --  180   NEUTROS PCT %  --   --  84*   LYMPHS PCT %  --   --  7*   MONOS PCT %  --   --  7   EOS PCT %  --   --  0    < > = values in this interval not displayed       Results from last 7 days   Lab Units 03/17/22  0528 03/14/22  0624 03/13/22  2211   SODIUM mmol/L 140   < > 143   POTASSIUM mmol/L 3 5   < > 4 2   CHLORIDE mmol/L 94*   < > 98*   CO2 mmol/L 44*   < > 45*   BUN mg/dL 16   < > 16   CREATININE mg/dL 0 81   < > 0 99   ANION GAP mmol/L 2*   < > 0* CALCIUM mg/dL 9 0   < > 9 5   ALBUMIN g/dL  --   --  3 2*   TOTAL BILIRUBIN mg/dL  --   --  0 69   ALK PHOS U/L  --   --  88   ALT U/L  --   --  26   AST U/L  --   --  16   GLUCOSE RANDOM mg/dL 114   < > 165*    < > = values in this interval not displayed       Results from last 7 days   Lab Units 03/13/22  2211   INR  1 01     Results from last 7 days   Lab Units 03/14/22  1639   POC GLUCOSE mg/dl 156*         Results from last 7 days   Lab Units 03/13/22  2211   LACTIC ACID mmol/L 0 7       Imaging: Reviewed radiology reports from this admission including: chest xray    Recent Cultures (last 7 days):           Lines/Drains:  Invasive Devices  Report    Peripheral Intravenous Line            Peripheral IV 03/15/22 Left;Ventral (anterior) Forearm 2 days                Telemetry:        Last 24 Hours Medication List:   Current Facility-Administered Medications   Medication Dose Route Frequency Provider Last Rate    acetaminophen  650 mg Oral Q6H PRN RADHA Banda      albuterol  2 5 mg Nebulization Q6H PRN Jose Manuel Arshad MD      aluminum-magnesium hydroxide-simethicone  30 mL Oral Q6H PRN RADHA Banda      ammonium lactate   Topical BID RADHA Banda      budesonide-formoterol  2 puff Inhalation BID RADHA Banda      buPROPion  450 mg Oral Daily RADHA Banda      cholecalciferol  2,000 Units Oral Daily RADHA Banda      dabigatran etexilate  150 mg Oral Q12H [address] Spike Storey MD      dicyclomine  20 mg Oral 4x Daily (AC & HS) RADHA Banda      docusate sodium  100 mg Oral BID Phoebe Merritt PA-C      famotidine  20 mg Intravenous Q24H [address] RADHA Hitchcock      furosemide  60 mg Intravenous BID RADHA Banda      HYDROmorphone  0 5 mg Intravenous Q4H PRN RADHA Banda      hydrOXYzine HCL  25 mg Oral TID PRN RADHA Banda      levothyroxine  25 mcg Oral Early Morning RADHA Banda  metoclopramide  10 mg Oral TID AC Mike Peter MD      metoprolol tartrate  50 mg Oral Q12H [address] RADHA Hitchcock      ondansetron  4 mg Intravenous Q6H PRN Susu Perdomo, RADHA      oxyCODONE  2 5 mg Oral Q6H PRN Susu Perdomo, RADHA      oxyCODONE  5 mg Oral Q6H PRN uSsu Perdomo, RADHA      pantoprazole  40 mg Oral Early Morning Jeromy Valenzuela MD      polyethylene glycol  17 g Oral Daily Samuel Purdy PA-C      sodium chloride  1 spray Each Nare Q2H PRN Susu Perdomo, RADHA      spironolactone  50 mg Oral Daily RADHA Campo          Today, Patient Was Seen By: Mike Peter MD    ** Please Note: This note has been constructed using a voice recognition system   **

## 2022-03-18 VITALS
TEMPERATURE: 97.7 F | RESPIRATION RATE: 20 BRPM | OXYGEN SATURATION: 95 % | DIASTOLIC BLOOD PRESSURE: 65 MMHG | BODY MASS INDEX: 45.1 KG/M2 | HEART RATE: 81 BPM | SYSTOLIC BLOOD PRESSURE: 94 MMHG | HEIGHT: 70 IN | WEIGHT: 315 LBS

## 2022-03-18 PROCEDURE — 99232 SBSQ HOSP IP/OBS MODERATE 35: CPT | Performed by: NURSE PRACTITIONER

## 2022-03-18 PROCEDURE — 99239 HOSP IP/OBS DSCHRG MGMT >30: CPT | Performed by: HOSPITALIST

## 2022-03-18 RX ORDER — PANTOPRAZOLE SODIUM 20 MG/1
40 TABLET, DELAYED RELEASE ORAL DAILY
Qty: 30 TABLET | Refills: 0 | Status: SHIPPED | OUTPATIENT
Start: 2022-03-18 | End: 2022-06-02 | Stop reason: ALTCHOICE

## 2022-03-18 RX ORDER — FUROSEMIDE 10 MG/ML
60 INJECTION INTRAMUSCULAR; INTRAVENOUS ONCE
Status: DISCONTINUED | OUTPATIENT
Start: 2022-03-18 | End: 2022-03-18 | Stop reason: HOSPADM

## 2022-03-18 RX ORDER — TORSEMIDE 20 MG/1
40 TABLET ORAL
Status: DISCONTINUED | OUTPATIENT
Start: 2022-03-18 | End: 2022-03-18 | Stop reason: HOSPADM

## 2022-03-18 RX ORDER — METOCLOPRAMIDE 10 MG/1
10 TABLET ORAL
Qty: 90 TABLET | Refills: 0 | Status: SHIPPED | OUTPATIENT
Start: 2022-03-18 | End: 2022-06-02 | Stop reason: ALTCHOICE

## 2022-03-18 RX ORDER — ONDANSETRON 4 MG/1
4 TABLET, FILM COATED ORAL EVERY 8 HOURS PRN
Qty: 20 TABLET | Refills: 0 | Status: ON HOLD
Start: 2022-03-18 | End: 2022-07-27

## 2022-03-18 RX ORDER — POLYETHYLENE GLYCOL 3350 17 G/17G
17 POWDER, FOR SOLUTION ORAL DAILY
Qty: 10 EACH | Refills: 0 | Status: SHIPPED | OUTPATIENT
Start: 2022-03-19

## 2022-03-18 RX ORDER — DICYCLOMINE HCL 20 MG
20 TABLET ORAL
Qty: 120 TABLET | Refills: 0 | Status: SHIPPED | OUTPATIENT
Start: 2022-03-18

## 2022-03-18 RX ORDER — DOCUSATE SODIUM 100 MG/1
100 CAPSULE, LIQUID FILLED ORAL 2 TIMES DAILY
Qty: 30 CAPSULE | Refills: 0 | Status: SHIPPED | OUTPATIENT
Start: 2022-03-18

## 2022-03-18 RX ORDER — FAMOTIDINE 20 MG/1
20 TABLET, FILM COATED ORAL DAILY
Qty: 30 TABLET | Refills: 0 | Status: SHIPPED | OUTPATIENT
Start: 2022-03-19

## 2022-03-18 RX ORDER — FAMOTIDINE 20 MG/1
20 TABLET, FILM COATED ORAL DAILY
Status: DISCONTINUED | OUTPATIENT
Start: 2022-03-18 | End: 2022-03-18 | Stop reason: HOSPADM

## 2022-03-18 RX ORDER — FUROSEMIDE 10 MG/ML
60 INJECTION INTRAMUSCULAR; INTRAVENOUS 2 TIMES DAILY
Status: DISCONTINUED | OUTPATIENT
Start: 2022-03-18 | End: 2022-03-18

## 2022-03-18 RX ORDER — MAGNESIUM HYDROXIDE/ALUMINUM HYDROXICE/SIMETHICONE 120; 1200; 1200 MG/30ML; MG/30ML; MG/30ML
30 SUSPENSION ORAL EVERY 6 HOURS PRN
Qty: 355 ML | Refills: 0 | Status: SHIPPED | OUTPATIENT
Start: 2022-03-18

## 2022-03-18 RX ADMIN — Medication 2000 UNITS: at 10:42

## 2022-03-18 RX ADMIN — METOCLOPRAMIDE 10 MG: 10 TABLET ORAL at 11:26

## 2022-03-18 RX ADMIN — PANTOPRAZOLE SODIUM 40 MG: 40 TABLET, DELAYED RELEASE ORAL at 06:40

## 2022-03-18 RX ADMIN — FAMOTIDINE 20 MG: 20 TABLET ORAL at 10:42

## 2022-03-18 RX ADMIN — BUDESONIDE AND FORMOTEROL FUMARATE DIHYDRATE 2 PUFF: 160; 4.5 AEROSOL RESPIRATORY (INHALATION) at 10:43

## 2022-03-18 RX ADMIN — DOCUSATE SODIUM 100 MG: 100 CAPSULE, LIQUID FILLED ORAL at 10:42

## 2022-03-18 RX ADMIN — OXYCODONE HYDROCHLORIDE 5 MG: 5 TABLET ORAL at 10:40

## 2022-03-18 RX ADMIN — Medication: at 10:43

## 2022-03-18 RX ADMIN — LEVOTHYROXINE SODIUM 25 MCG: 25 TABLET ORAL at 06:40

## 2022-03-18 RX ADMIN — DICYCLOMINE HYDROCHLORIDE 20 MG: 20 TABLET ORAL at 11:26

## 2022-03-18 RX ADMIN — BUPROPION HYDROCHLORIDE 450 MG: 150 TABLET, FILM COATED, EXTENDED RELEASE ORAL at 10:41

## 2022-03-18 RX ADMIN — DABIGATRAN ETEXILATE MESYLATE 150 MG: 150 CAPSULE ORAL at 10:41

## 2022-03-18 RX ADMIN — DICYCLOMINE HYDROCHLORIDE 20 MG: 20 TABLET ORAL at 06:40

## 2022-03-18 RX ADMIN — METOCLOPRAMIDE 10 MG: 10 TABLET ORAL at 06:40

## 2022-03-18 RX ADMIN — HYDROMORPHONE HYDROCHLORIDE 0.5 MG: 1 INJECTION, SOLUTION INTRAMUSCULAR; INTRAVENOUS; SUBCUTANEOUS at 11:32

## 2022-03-18 NOTE — PLAN OF CARE
Problem: Potential for Falls  Goal: Patient will remain free of falls  Description: INTERVENTIONS:  - Educate patient/family on patient safety including physical limitations  - Instruct patient to call for assistance with activity   - Consult OT/PT to assist with strengthening/mobility   - Keep Call bell within reach  - Keep bed low and locked with side rails adjusted as appropriate  - Keep care items and personal belongings within reach  - Initiate and maintain comfort rounds  - Make Fall Risk Sign visible to staff  - Offer Toileting every 2 Hours, in advance of need  - Initiate/Maintain bed alarm  - Obtain necessary fall risk management equipment: bed alarm  - Apply yellow socks and bracelet for high fall risk patients  - Consider moving patient to room near nurses station  Outcome: Progressing     Problem: MOBILITY - ADULT  Goal: Maintain or return to baseline ADL function  Description: INTERVENTIONS:  -  Assess patient's ability to carry out ADLs; assess patient's baseline for ADL function and identify physical deficits which impact ability to perform ADLs (bathing, care of mouth/teeth, toileting, grooming, dressing, etc )  - Assess/evaluate cause of self-care deficits   - Assess range of motion  - Assess patient's mobility; develop plan if impaired  - Assess patient's need for assistive devices and provide as appropriate  - Encourage maximum independence but intervene and supervise when necessary  - Involve family in performance of ADLs  - Assess for home care needs following discharge   - Consider OT consult to assist with ADL evaluation and planning for discharge  - Provide patient education as appropriate  Outcome: Progressing  Goal: Maintains/Returns to pre admission functional level  Description: INTERVENTIONS:  - Perform BMAT or MOVE assessment daily    - Set and communicate daily mobility goal to care team and patient/family/caregiver     - Collaborate with rehabilitation services on mobility goals if consulted  - Perform Range of Motion 3 times a day  - Reposition patient every 2 hours  - Dangle patient 3 times a day  - Stand patient 3 times a day  - Ambulate patient 3 times a day  - Out of bed to chair 3 times a day   - Out of bed for meals 3 times a day  - Out of bed for toileting  - Record patient progress and toleration of activity level   Outcome: Progressing     Problem: Prexisting or High Potential for Compromised Skin Integrity  Goal: Skin integrity is maintained or improved  Description: INTERVENTIONS:  - Identify patients at risk for skin breakdown  - Assess and monitor skin integrity  - Assess and monitor nutrition and hydration status  - Monitor labs   - Assess for incontinence   - Turn and reposition patient  - Assist with mobility/ambulation  - Relieve pressure over bony prominences  - Avoid friction and shearing  - Provide appropriate hygiene as needed including keeping skin clean and dry  - Evaluate need for skin moisturizer/barrier cream  - Collaborate with interdisciplinary team   - Patient/family teaching  - Consider wound care consult   Outcome: Progressing     Problem: Nutrition/Hydration-ADULT  Goal: Nutrient/Hydration intake appropriate for improving, restoring or maintaining nutritional needs  Description: Monitor and assess patient's nutrition/hydration status for malnutrition  Collaborate with interdisciplinary team and initiate plan and interventions as ordered  Monitor patient's weight and dietary intake as ordered or per policy  Utilize nutrition screening tool and intervene as necessary  Determine patient's food preferences and provide high-protein, high-caloric foods as appropriate       INTERVENTIONS:  - Monitor oral intake, urinary output, labs, and treatment plans  - Assess nutrition and hydration status and recommend course of action  - Evaluate amount of meals eaten  - Assist patient with eating if necessary   - Allow adequate time for meals  - Recommend/ encourage appropriate diets, oral nutritional supplements, and vitamin/mineral supplements  - Order, calculate, and assess calorie counts as needed  - Recommend, monitor, and adjust tube feedings and TPN/PPN based on assessed needs  - Assess need for intravenous fluids  - Provide specific nutrition/hydration education as appropriate  - Include patient/family/caregiver in decisions related to nutrition  Outcome: Progressing

## 2022-03-18 NOTE — CASE MANAGEMENT
Case Management Assessment & Discharge Planning Note    Patient name Yoselin Flank  Location W /W -69 MRN [id number]  : 1970 Date 3/18/2022       Current Admission Date: 3/13/2022  Current Admission Diagnosis:Intractable abdominal pain   Patient Active Problem List    Diagnosis Date Noted    Intractable abdominal pain 2022    Epidermoid cyst of neck 2022    Hypotension 2021    Acute on chronic respiratory failure with hypoxia ([facility] ) 2021    Cellulitis of multiple sites of head and neck 2021    Hypokalemia 2021    Ventral hernia without obstruction or gangrene 2021    Elevated troponin 2021    History of DVT (deep vein thrombosis) 2021    Positive blood culture 2021    QT prolongation 2021    COPD (chronic obstructive pulmonary disease) ([facility] ) 05/10/2021    Hypothyroidism 05/10/2021    Medical non-compliance 2017    Atrial fibrillation (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 [facility] ) 2016    MRSA (methicillin resistant Staphylococcus aureus) Tracheobronchitis 2016    Shortness of breath 08/15/2016    Morbid obesity ([facility] [facility] ) 08/15/2016    Diabetes mellitus ([facility] [facility] ) 08/15/2016      LOS (days): 2  Geometric Mean LOS (GMLOS) (days):   Days to GMLOS:     OBJECTIVE:    Risk of Unplanned Readmission Score: 25         Current admission status: Inpatient       Preferred Pharmacy:   [facility]/pharmacy #[id number]- [address], [address]  [address]   [address] [place] [address]  Phone: [phone] Fax: [phone]    Primary Care Provider: Rubio Rico Ruben Everett MD    Primary Insurance: [address]  Secondary Insurance:     ASSESSMENT:  Carol Tolbert Proxies    There are no active Health Care Proxies on file  DISCHARGE DETAILS:    Discharge planning discussed with[de-identified] Pt  Freedom of Choice: Yes  Comments - Freedom of Choice: Pt wishes to return to [place] at [facility], where he resides  Contacts  Patient Contacts: Pt  Relationship to Patient[de-identified] Other (Comment)  Contact Method: In Person  Reason/Outcome: Discharge Planning,Continuity of Care              Other Referral/Resources/Interventions Provided:  Referral Comments: Pt wishes to return to [place] at [facility]  Referall was placed via [facility], facility is accepting           Treatment Team Recommendation: Facility Return  Discharge Destination Plan[de-identified] Facility Return  Transport at Discharge : [facility]  Dispatcher Contacted: Yes  Number/Name of Dispatcher: Mary Ann Masseyin  Transported by [employer] and Unit #): FARHAN  ETA of Transport (Date): 03/18/22  ETA of Transport (Time): 1500                                  Facility/Agency Fax Number: [phone]

## 2022-03-18 NOTE — UTILIZATION REVIEW
Please note that member is still in house as of today 3/18/22    Inpatient Admission Authorization Request   NOTIFICATION OF INPATIENT ADMISSION/INPATIENT AUTHORIZATION REQUEST   [facility]IC[facility] FAC[facility]   [facility] [address] [place] [address]  Tax ID: [id number]  NPI: [id number]  Place of Service: Inpatient [address]  Place of Service Code: 24     ATTENDING PROVIDER:  Attending Name and NPI#: Fabrice Zeenat, Linn Wray Merylyolanda [[id number]]  Address: [facility], [address] [place] [address]  Phone: [phone]     UTILIZATION REVIEW CONTACT:  Jena Yepez, Utilization   Network Utilization Review Department  Phone: [phone]  Fax: [phone]  Email: [email or web address]     PHYSICIAN ADVISORY SERVICES:  FOR [id number] REVIEW - MEDICAL NECESSITY DENIAL  Phone: [phone]  Fax: [phone]  Email: [email or web address]     TYPE OF REQUEST:  Inpatient Status     ADMISSION INFORMATION:  ADMISSION DATE/TIME: 3/16/22  2:32 PM  PATIENT DIAGNOSIS CODE/DESCRIPTION:  Umbilical pain [N56 73]  Abdominal pain [R10 9]  Nausea and vomiting [R11 2]  BMI 70 and over, adult [facility]) [Z68 45]  History of abdominal surgery [Z98 890]  DISCHARGE DATE/TIME: No discharge date for patient encounter  IMPORTANT INFORMATION:  Please contact the Jena Yepez directly with any questions or concerns regarding this request  Department voicemails are confidential     Send requests for admission clinical reviews, concurrent reviews, approvals, and administrative denials due to lack of clinical to fax [phone]  Initial Clinical Review  OBSERVATION  3/14/22 @0153 CONVERTED TO INPATIENT ADMISSION 3/16/22 @1432 DUE TO CONTINUED STAY REQUIRED TO EVALUATE AND TREAT PATIENT WITH ABDOMINAL PAIN,ESOPHAGITIS,  SUSPECTED FOOD BEZOAR AND ACUTE ON CHRONIC CHF WITH IV PPI DRIP,REGLAN, IV  DIURESIS , CONTINUED MONITORING  CARDIOLOGY AND GI FOLLOWING      Admission: Date/Time/Statement:   Admission Orders (From admission, onward)     Ordered        03/16/22 1432  Inpatient Admission  Once            03/14/22 0153  Place in Observation  Once                      Orders Placed This Encounter   Procedures    Inpatient Admission     Standing Status:   Standing     Number of Occurrences:   1     Order Specific Question:   Level of Care     Answer:   Med Surg [16]     Order Specific Question:   Estimated length of stay     Answer:   More than 2 Midnights     Order Specific Question:   Certification     Answer:   I certify that inpatient services are medically necessary for this patient for a duration of greater than two midnights  See H&P and MD Progress Notes for additional information about the patient's course of treatment  ED Arrival Information     Expected Arrival Acuity    - 3/13/2022 21:24 Urgent         Means of arrival Escorted by Service Admission type    [facility]ulance [facility]         Arrival complaint    abdominal pain        Chief Complaint   Patient presents with    Abdominal Pain     Pt complains of center abdominal pain for the last couple of days  Initial Presentation: 47 y o  male with PMHx of morbid obesity, DCHF, COPD on 2 L O2 home use, Afib, prior DVT on pradaxa, FILIPPO presented with abdominal pain x2 days with  intractable nausea and vomiting  Also reports dyspnea and 20 lb weight gain over the last several months  On exam, pt requiring mor O2 than baseline with sat down to 88%  Pt on 4 L O2 with sat low 90's, has irreg heart rhythm, accessory muscle use with rales bilat mid lobes  , generalized abdominal tenderness  Unable to perform CT due to body habitus  Labs- troponin initial 3,   Pt given multiple doses IV analgesic, IVF, Iv antiemetic,IV Lasix, po bentyl in ED  Pt admitted as OBS with intractable abdomonal pain, acute on chronic diastolicCHF, acute on chronic resp failure   Plan - surgery  and cardiology consults, pain control, antiemetics, GI cocktail, CL diet, advance as radhames, trend troponin to peak, IV lasix 60 mg BID, BB, I/O daily wt, supplemental o2      Wt Readings from Last 3 Encounters:   03/13/22 (!) 248 kg (546 lb 4 8 oz)   02/22/22 (!) 242 kg (534 lb)-via frankie lift at facility   02/18/22 (!) 242 kg (534 lb 6 3 oz)     gen'l surgery-Abdomen soft, tenderness over hernia that is soft  No surgical intervention  Serial abdominal exams  Advance diet as tolerated  Cardiology-emesis occasionally black/coffee-ground in appearance  Recommend GI consult  Reports increasing SOB/SHARIF /chest presssure x2-3 days  XR shows possible pulm vasc congestion  Agree with IV lasix 60 mg BID, assess response,strict I/O , daily wt, 2 Gm na diet w/ 1800 ml FR, monitor renal function , lytes, keep K >4, mag >2 nedds lifestyle /diet modification, wt loss  GI-Possibly peptic ulcer disease versus gastritis versus Stefany-Fernandez tear  Plan - continue PPI, NPO after MN for EGD 3/15  Continue bentyl  Date:3/15   Day 2:   Per general surgery, pending results of EGD today would advance diet as tolerated  Pt having mid abdominal pain, no N/V  Hgb stable 13 2  On IV PPI drip  Per cardiology, continue IV diuretics-60 mg IV lasix BID   AC on hold for EGD today  Pt feels more comfortable lying flat today  Currently requiring 4 L of supplemental oxygen sats in the low to mid 90s  Normal lung sounds, distant heart tones  BLE edema  I/O = -650 ml , likely inaccurate  Wt 565 lbs today, unable to stand for wt, using bed scale  Venous duplex study 3/14/2022; no evidence of acute or chronic DVT in the right or left lower limb  [email or web address]  GI- EGD with IV anesthesia   IMPRESSION:  Mid and distal esophagitis, scope was advanced to the gastric cardia where large amount of food was retained  The procedure was aborted at that point due to risk of aspiration    There was a copious amount of food up to his gastric cardia  Suspect food bezoar   GI recommends- Reglan , CL diet, check UGI series  Pt advanced to CL then NPO effective 3/16 @0001  3/15 FOREIGN BODY , STOMACH     Date 3/16  Cardiology- Difficult to obtain weights due to body habitus and inability to stand  Wt 565 04 lbs today   Breathing slowly improving  I/O = -660 ml last 24 hrs, overall -1 2 L  Breath sounds diminished bilaterally, BLE edema   Continue metoprolol tartrate and resume anticoagulation when cleared by GI  Continue furosemide 60 mg IV b i d  Renal function stable   Wound care consult -  intertriginous dermatitis (ITD) in groin folds  fungal smell per staff  Currently Nystatin cream being used  Recommend stop nystatin, start Interdry wicking fabric that is impregnated with silver  Interdry will wick fabric from the skin fold and the silver will treat topically the cutaneous fungal infection  GI-Suspecting food bezoar  Pt on liquid diet and reglan started , post EGD yesterday  - Continue reglan and CL diet  Abdominal exams  Due to body habitus, further imaging including UGI series unable to be obtained  Reached out to [facility] with bariatric CT scan but pts hip measurement >80 cm - unable to use this CT then  Only other option would be a scanner out of network   Medicine- monitor for tardive dyskinesia with scheduled reglan  Pt continues to report abdominal pain, per nursing notes 6-7/10  Pt receiving prn IV and prn po narcotic anagesics for pain No recent bm  Pt continues PPI drip  Pt on O2 @4 L,with sats low to mid 90's -home use 2 L O2  OK to resume AC/Pradaxa per GI  Date 3/17 day 2   PPI  drip d/blanka today  And pt started on po PPI  Cardiology- pt subjectively 75 % improved with his breathing, not at baseline  I/O = -2 3 L overall, -1 1 L last 24 hrs  Lungs diminished bilaterally, has BLE edema  Wt 256 kg =564 6 lbs  Continue IV lasix 60 mg BID  GI-Pt reports GI symptoms improved, has nausea but no vomiting, passing gas, no bm   Tolerating liquids  + abdominal hernias noted  Reports the same tenderness to palpation in the upper abdomen  Bowel sounds present  LLQ abdom pain 6/10 this am per nursing   [place] Plan - continue CL diet, Added miralax and stool softeners to aid in bm's  Continue  Reglan      Continue abdominal exams  If patient gets readmitted in the future would recommend he be referred to a facility that is capable of performing imaging on him      ED Triage Vitals [03/13/22 2131]   Temperature Pulse Respirations Blood Pressure SpO2   97 8 °F (36 6 °C) 87 19 155/60 92 %      Temp Source Heart Rate Source Patient Position - Orthostatic VS BP Location FiO2 (%)   Oral Monitor Lying Right arm --      Pain Score       8          Wt Readings from Last 1 Encounters:   03/17/22 (!) 256 kg (564 lb 9 6 oz)     Additional Vital Signs:   Date/Time Temp Pulse Resp BP MAP (mmHg) SpO2 Calculated FIO2 (%) - Nasal Cannula Nasal Cannula O2 Flow Rate (L/min) O2 Device   03/17/22 07:41:36 98 2 °F (36 8 °C) 112 Abnormal  20 132/76 95 94 % -- -- --   03/17/22 07:41:17 98 2 °F (36 8 °C) 111 Abnormal  18 132/76 95 94 % -- -- --   03/16/22 21:51:06 98 3 °F (36 8 °C) 107 Abnormal  20 116/82 93 91 % -- -- --   03/16/22 16:06:45 98 9 °F (37 2 °C) 116 Abnormal  18 117/71 86 89 % Abnormal  36 4 L/min Nasal cannula   03/16/22 16:06:33 98 9 °F (37 2 °C) 123 Abnormal  -- 117/71 86 89 % Abnormal  -- -- --       Date/Time Temp Pulse Resp BP MAP (mmHg) SpO2 Calculated FIO2 (%) - Nasal Cannula Nasal Cannula O2 Flow Rate (L/min) O2 Device   03/16/22 06:23:58 97 6 °F (36 4 °C) 104 19 110/61 77 91 % -- -- --   03/15/22 20:43:45 97 8 °F (36 6 °C) 102 20 129/65 86 91 % 36 4 L/min Nasal cannula   03/15/22 1954 -- -- -- -- -- 90 % 34 3 5 L/min Nasal cannula   03/15/22 1626 -- 119 Abnormal  20 137/63 -- 95 % 36 4 L/min EtCO2 mask   03/15/22 1616 -- 124 Abnormal  20 126/64 -- 95 % 36 4 L/min EtCO2 mask   03/15/22 1606 -- 117 Abnormal  20 120/75 -- 92 % 36 4 L/min EtCO2 mask 03/15/22 1556 -- 113 Abnormal  20 130/82 -- 92 % 36 4 L/min EtCO2 mask   03/15/22 1541 98 °F (36 7 °C) 115 Abnormal  20 134/93 -- 95 % 32 3 L/min EtCO2 mask   03/15/22 15:35:37 -- 113 Abnormal  -- -- -- 98 % -- -- --   03/15/22 15:34:36 -- 105 -- -- -- 97 % -- -- --   03/15/22 15:33:37 -- 104 -- -- -- 96 % -- -- --   03/15/22 15:32:37 -- 108 Abnormal  -- -- -- 96 % -- -- --   03/15/22 15:31:37 -- 94 -- -- -- 95 % -- -- --   03/15/22 15:30:37 -- 108 Abnormal  -- -- -- 90 % -- -- --   03/15/22 15:29:36 -- 103 -- -- -- 90 % -- -- --   03/15/22 15:28:36 -- 101 -- -- -- 99 % -- -- --   03/15/22 15:27:37 -- 102 -- -- -- 99 % -- -- --   03/15/22 15:26:37 -- 101 -- -- -- 98 % -- -- --   03/15/22 15:25:37 -- 98 -- -- -- 94 % -- -- --   03/15/22 1510 97 1 °F (36 2 °C) Abnormal  99 18 112/55 -- 93 % 36 4 L/min Nasal cannula   03/15/22 08:19:19 -- 104 16 124/70 88 90 % -- -- --       Date/Time Temp Pulse Resp BP MAP (mmHg) SpO2 Calculated FIO2 (%) - Nasal Cannula Nasal Cannula O2 Flow Rate (L/min) O2 Device O2 Interface Device   03/14/22 2235 -- -- -- -- -- 94 % 36 4 L/min Nasal cannula --   03/14/22 2100 -- 100 -- 130/80 -- -- -- -- -- --   03/14/22 16:41:50 97 3 °F (36 3 °C) Abnormal  84 16 123/73 90 91 % -- -- -- --   03/14/22 0821 -- -- -- -- -- 92 % -- -- -- --   03/14/22 08:17:32 97 5 °F (36 4 °C) 101 18 125/65 85 84 % Abnormal  -- -- -- --   03/14/22 0426 -- -- -- -- -- 93 % -- -- -- Face mask   03/14/22 03:14:37 97 2 °F (36 2 °C) Abnormal  91 18 122/71 88 88 % Abnormal  -- -- -- --   03/14/22 0230 -- 96 20 140/62 86 92 % 36 4 L/min Nasal cannula --   03/14/22 0200 -- 92 21 147/67 97 92 % -- -- -- --   03/14/22 0000 -- 86 20 168/72 104 92 % 36 4 L/min Nasal cannula --   03/13/22 2300 -- 84 20 160/76 109 93 % 36 4 L/min Nasal cannula --   03/13/22 2230 -- 90 20 149/63 91 92 % 36 4 L/min Nasal cannula --       Pertinent Labs/Diagnostic Test Results:   VAS lower limb venous duplex study, complete bilateral   RIGHT LOWER LIMB:  No evidence of acute or chronic deep vein thrombosis  No evidence of superficial thrombophlebitis noted  Doppler evaluation shows a normal response to augmentation maneuvers  Popliteal, posterior tibial and anterior tibial arterial Doppler waveforms are  triphasic  LEFT LOWER LIMB:  No evidence of acute or chronic deep vein thrombosis  No evidence of superficial thrombophlebitis noted  Doppler evaluation shows a normal response to augmentation maneuvers  Popliteal, posterior tibial and anterior tibial arterial Doppler waveforms are  triphasic  Technically difficult/limited study to body habitus, pitting edema and poor  visualization of calf veins  XR chest 1 view portable   ED Interpretation by Marivel Saleh PA-C (03/13 9516)   Mild cardiomegaly on initial read      Final Result by Aylin Canas MD (03/14 1057)      No acute cardiopulmonary disease  3/13 ECG-ED  Rate:     ECG rate:  88     ECG rate assessment: normal     Rhythm:     Rhythm: sinus rhythm     Ectopy:     Ectopy: none     QRS:     QRS axis:  Normal     QRS intervals:  Normal   Conduction:     Conduction: normal     ST segments:     ST segments:  Normal        Results from last 7 days   Lab Units 03/14/22  0353   SARS-COV-2  Negative     Results from last 7 days   Lab Units 03/16/22  0529 03/15/22  1109 03/15/22  0612 03/14/22  2120 03/14/22  0624 03/13/22  2211 03/13/22  2211   WBC Thousand/uL 5 15  --  8 73  --  11 37*   < > 9 41   HEMOGLOBIN g/dL 12 3 12 7 13 2 13 5 14 2   < > 13 0   HEMATOCRIT % 39 9 40 8 42 3 42 0 45 7   < > 39 9   PLATELETS Thousands/uL 178  --  180  --  202   < > 175   NEUTROS ABS Thousands/µL  --   --  7 43  --  10 24*  --  7 80*    < > = values in this interval not displayed           Results from last 7 days   Lab Units 03/17/22  0528 03/16/22  0529 03/15/22  0612 03/14/22  0624 03/13/22  2211   SODIUM mmol/L 140 146* 147* 143 143   POTASSIUM mmol/L 3 5 3 9 4 0 4 8 4 2   CHLORIDE mmol/L 94* 98* 102 98* 98*   CO2 mmol/L 44* >45* 43* 40* 45*   ANION GAP mmol/L 2*  --  2* 5 0*   BUN mg/dL 16 19 17 16 16   CREATININE mg/dL 0 81 0 92 0 92 0 97 0 99   EGFR ml/min/1 73sq m 102 95 95 90 87   CALCIUM mg/dL 9 0 9 2 9 2 9 7 9 5   MAGNESIUM mg/dL 2 1  --   --   --   --      Results from last 7 days   Lab Units 03/13/22  2211   AST U/L 16   ALT U/L 26   ALK PHOS U/L 88   TOTAL PROTEIN g/dL 7 7   ALBUMIN g/dL 3 2*   TOTAL BILIRUBIN mg/dL 0 69     Results from last 7 days   Lab Units 03/14/22  1639   POC GLUCOSE mg/dl 156*     Results from last 7 days   Lab Units 03/17/22  0528 03/16/22  0529 03/15/22  0612 03/14/22  0624 03/13/22  2211   GLUCOSE RANDOM mg/dL 114 125 134 174* 165*              Results from last 7 days   Lab Units 03/13/22  2304   PH TRACI  7 330   PCO2 TRACI mm Hg 79 6*   PO2 TRACI mm Hg 130 5*   HCO3 TRACI mmol/L 41 0*   BASE EXC TRACI mmol/L 11 6   O2 CONTENT TRACI ml/dL 18 4   O2 HGB, VENOUS % 94 7*             Results from last 7 days   Lab Units 03/13/22  2211   HS TNI 0HR ng/L 3         Results from last 7 days   Lab Units 03/13/22  2211   PROTIME seconds 13 3   INR  1 01   PTT seconds 32             Results from last 7 days   Lab Units 03/13/22  2211   LACTIC ACID mmol/L 0 7             Results from last 7 days   Lab Units 03/13/22  2211   NT-PRO BNP pg/mL 752*             Results from last 7 days   Lab Units 03/13/22  2211   LIPASE u/L 228                     Results from last 7 days   Lab Units 03/14/22  0353   INFLUENZA A PCR  Negative   INFLUENZA B PCR  Negative   RSV PCR  Negative             ED Treatment:   Medication Administration from 03/13/2022 2124 to 03/14/2022 0303       Date/Time Order Dose Route Action     03/13/2022 2212 sodium chloride 0 9 % bolus 1,000 mL 1,000 mL Intravenous New Bag     03/13/2022 2217 HYDROmorphone (DILAUDID) injection 1 mg 1 mg Intravenous Given     03/13/2022 2214 ondansetron (ZOFRAN) injection 4 mg 4 mg Intravenous Given     03/13/2022 2215 famotidine (PEPCID) injection 20 mg 20 mg Intravenous Given     03/14/2022 0023 HYDROmorphone (DILAUDID) injection 0 5 mg 0 5 mg Intravenous Given     03/14/2022 0118 furosemide (LASIX) injection 40 mg 40 mg Intravenous Given     03/14/2022 0154 HYDROmorphone (DILAUDID) injection 0 5 mg 0 5 mg Intravenous Given     03/14/2022 0154 dicyclomine (BENTYL) tablet 20 mg 20 mg Oral Given        Past Medical History:   Diagnosis Date    Afib ([facility] )     Anemia     Anxiety     Cancer ([place] [address] )     Cardiac disease     Cellulitis     COPD (chronic obstructive pulmonary disease) ([facility] )     Depression     Diabetes mellitus ([place] [address] )     [facility]     GERD (gastroesophageal reflux disease)     HBP (high blood pressure)     Heart failure ([facility])     Hx of blood clots     Hypertension     Hypokalemia     Hypothyroid     Obesity     Psychiatric disorder      Present on Admission:   Acute on chronic respiratory failure with hypoxia ([facility] )   Morbid obesity ([place] [address] )   Obesity hypoventilation syndrome/FILIPPO   Acute on chronic diastolic congestive heart failure ([facility])   Atrial fibrillation (HCC)   COPD (chronic obstructive pulmonary disease) ([facility])   Hypothyroidism      Admitting Diagnosis: Umbilical pain [Y33 59]  Abdominal pain [R10 9]  Nausea and vomiting [R11 2]  BMI 70 and over, adult [facility]) [Z68 45]  History of abdominal surgery [Z98 890]  Age/Sex: 47 y o  male  Admission Orders:  Scheduled Medications:  ammonium lactate, , Topical, BID  budesonide-formoterol, 2 puff, Inhalation, BID  buPROPion, 450 mg, Oral, Daily  cholecalciferol, 2,000 Units, Oral, Daily  dabigatran etexilate, 150 mg, Oral, Q12H SCHStart: 03/15/22 2100  dicyclomine, 20 mg, Oral, 4x Daily (AC & HS)  famotidine, 20 mg, Intravenous, Q24H MARY  furosemide, 60 mg, Intravenous, BID  levothyroxine, 25 mcg, Oral, Early Morning  metoclopramide, 10 mg, Oral, TID AC Start: 03/16/22 0715  metoprolol tartrate, 50 mg, Oral, Q12H MARY  nystatin, , Topical, BIDEnd: 03/16/22 1027  spironolactone, 50 mg, Oral, Daily    pantoprazole (PROTONIX) injection 40 mg  Dose: 40 mg  Freq: Every 12 hours scheduled Route: IV  Start: 03/14/22 0900 End: 03/14/22 1354  polyethylene glycol (MIRALAX) packet 17 g  Dose: 17 g  Freq: Daily Route: PO  Start: 03/17/22 0915  pantoprazole (PROTONIX) EC tablet 40 mg  Dose: 40 mg  Freq: Daily (early morning) Route: PO  Start: 03/17/22 1015      Continuous IV Infusions:  pantoprozole (PROTONIX) infusion (Continuous), 8 mg/hr, Intravenous, ContinuousEnd: 03/17/22 1014      PRN Meds:  acetaminophen, 650 mg, Oral, Q6H PRN  aluminum-magnesium hydroxide-simethicone, 30 mL, Oral, Q6H PRN  HYDROmorphone, 0 5 mg, Intravenous, Q4H PRN x4 3/14, x4 3/15 x2 3/16 x1 3/17  hydrOXYzine HCL, 25 mg, Oral, TID PRN  ondansetron, 4 mg, Intravenous, Q6H PRN x2 3/14  oxyCODONE, 2 5 mg, Oral, Q6H PRN x1 3/16, x1 3/17  oxyCODONE, 5 mg, Oral, Q6H PRN x3 3/14, x4 3/15 x2 3/16  sodium chloride, 1 spray, Each Nare, Q2H PRN  albuterol inhalation solution 2 5 mg  Dose: 2 5 mg  Freq: Every 6 hours PRN Route: NEBULIZATION x1 3/15    NPO   daily wt   I/O  Up as radhames          IP CONSULT TO ACUTE CARE SURGERY  IP CONSULT TO NUTRITION SERVICES  IP CONSULT TO CARDIOLOGY  IP CONSULT TO GASTROENTEROLOGY    Network Utilization Review Department  ATTENTION: Please call with any questions or concerns to [phone] and carefully listen to the prompts so that you are directed to the right person  All voicemails are confidential   Lirirehan Barfield all requests for admission clinical reviews, approved or denied determinations and any other requests to dedicated fax number below belonging to the campus where the patient is receiving treatment   List of dedicated fax numbers for the Facilities:  [address] DENIALS (Administrative/Medical Necessity) [phone]   [address] (Maternity/NICU/Pediatrics) 261 [facility] [phone]   Sky [place] [address]  [phone]   Duncan Maynard 50 [address] [facility] [place] [address] [facility] [place] [address] [id number] [place] [address] [place] [phone] [facility] [phone]

## 2022-03-18 NOTE — DISCHARGE INSTRUCTIONS
Esophagitis   WHAT YOU NEED TO KNOW:   Esophagitis is inflammation or irritation of the lining of the esophagus  DISCHARGE INSTRUCTIONS:   Call your local emergency number (911 in the [facility] [facility] [facility]) for any of the following:   · You have any of the following signs of a heart attack:      ? Squeezing, pressure, or pain in your chest    ? You may  also have any of the following:     § Discomfort or pain in your back, neck, jaw, stomach, or arm    § Shortness of breath    § Nausea or vomiting    § Lightheadedness or a sudden cold sweat      Seek care immediately if:   · You feel like you have food stuck in your throat and you cannot cough it out  Call your doctor if:   · You have new or worsening symptoms, even after treatment  · You have questions or concerns about your condition or care  Medicines:   · Medicines  may be given to fight an infection or to control stomach acid  · Take your medicine as directed  Contact your healthcare provider if you think your medicine is not helping or if you have side effects  Tell him or her if you are allergic to any medicine  Keep a list of the medicines, vitamins, and herbs you take  Include the amounts, and when and why you take them  Bring the list or the pill bottles to follow-up visits  Carry your medicine list with you in case of an emergency  Manage or prevent esophagitis:   · Do not smoke  Nicotine and other chemicals in cigarettes and cigars can irritate and damage your esophagus  Ask your healthcare provider for information if you currently smoke and need help to quit  E-cigarettes or smokeless tobacco still contain nicotine  Talk to your healthcare provider before you use these products  · Do not drink alcohol  Alcohol can irritate your esophagus  Talk to your healthcare provider if you need help to stop drinking  · Limit or do not eat foods that can lead to esophagitis  Foods such as oranges and salsa can irritate your esophagus   Caffeine and chocolate can cause acid reflux  High-fat and fried foods make your stomach digest food more slowly  This increases the amount of stomach acid your esophagus is exposed to  Eat small meals, and drink water with your meals  Soft foods such as yogurt and applesauce may help soothe your throat  Do not eat for at least 3 hours before you go to bed  · Drink more liquid when you take pills  Drink a full glass of water when you take your pills  Ask your healthcare provider if you can take your pills at least an hour before you go to bed  · Prevent acid reflux  Do not bend over unless it is necessary  Acid may back up into your esophagus when you bend over  If possible, keep the head of your bed elevated while you sleep  This will help keep acid from backing up  Manage stress  Stress can make your symptoms worse or cause stomach acid to back up  · Keep batteries and similar objects out of the reach of children  Babies often put items in their mouths to explore them  Button batteries are easy to swallow and can cause serious damage  Keep the battery covers of electronic devices such as remote controls taped closed  Store all batteries and toxic materials where children cannot get to them  Use childproof locks to keep children away from dangerous materials  Follow up with your doctor as directed: Your doctor may refer you to a stomach specialist, allergist, or dietitian  You may need ongoing tests or treatment  Write down your questions so you remember to ask them during your visits  © Copyright [employer] 2022 Information is for End User's use only and may not be sold, redistributed or otherwise used for commercial purposes  All illustrations and images included in CareNotes® are the copyrighted property of [employer]  or Kesha Mendosa  The above information is an  only  It is not intended as medical advice for individual conditions or treatments   Talk to your doctor, nurse or pharmacist before following any medical regimen to see if it is safe and effective for you

## 2022-03-18 NOTE — PROGRESS NOTES
General Cardiology   Progress Note -  [facility]   Audra Romano 47 y o  male MRN: [id number]    Unit/Bed#: W -01 Encounter: [id number]    Assessment  1  Epigastric/abdominal pain w/hematemesis  -Management per the GI service    -Symptoms appear improved  -EGD 3/15; mid and distal esophagitis   -On Promotility agents, PPI, and dietary modifications  2  Acute on chronic diastolic CHF  -Difficult to assess volume status given body habitus   -Subjectively feels a significant improvement in his breathing since admission  -X-ray imaging - possible pulmonary vascular congestion - though limited study given his body habitus  -NT proBNP level - 752 (previously 607 - in February 2022)  -Previously on oral furosemide 80 mg b i d  (oral diuretics currently on)  -Currently on IV furosemide 60 mg b i d   -24 hour I&O balance; -1 4 L; overall -3 7 L  -Difficult to ascertain his true baseline dry weights  Recent weight at his nursing home facility via [address] was 534 lbs  3  Permanent atrial fibrillation   -12 lead ECG 3/13; atrial fibrillation, rate controlled   -Non telemetry currently  -On metoprolol tartrate 50 mg q 12 hours  -On Pradaxa 150 mg b i d    4  Essential hypertension  -Average BP  104/66, last recorded 94/64, HR 81    -Outpatient BP regimen; furosemide 80 mg BID, and metoprolol tartrate 50 mg q 12 hours  -Inpatient BP regimen:  Metoprolol tartrate 50 mg q 12 hours, and IV furosemide 60 mg b i d   5  DVT s/p IVC filter, and on Pradaxa 150 mg b i d  -Venous duplex study 3/14/2022; no evidence of acute or chronic DVT in the right or left lower limb  6  Morbid obesity; BMI 81  7  OHS /  acute on chronic hypoxic/hypercapnic respiratory failure - chronically wears 2 L of supplemental O2 at baseline, currently requiring 4 L of supplemental oxygen sats in the low to mid 90s   8  FILIPPO- on CPAP at HS     Plan  -Significant improvement in volume/respiratory status   Received a dose of IV Lasix darrel thrasher DC further IV diuresis after a m dose and transition to torsemide 40 mg b i d this afternoon (he had previously been on oral furosemide 80 mg b i d as an outpatient- which appears to have been ineffective at keeping him euvolemic  Will assess his response to this over the next 24 hours   -Continue metoprolol tartrate 50 mg q 12 hours   -Resume oral anticoagulation with Pradaxa 150 mg b i d  - discussed this with GI   -Strict I&Os, daily weights, 2 g NA +diet 1 8 L FR   -Monitor renal function electrolytes closely   Replete to maintain K +level of 4 0, magnesium 2 0   -Pt needs extensive diet/lifestyle modifications and weight loss  -No indication for telemetry monitoring  Subjective  Review of Systems   Constitutional: Positive for malaise/fatigue  Negative for chills and fever  Eyes: Negative for visual disturbance  Cardiovascular: Negative for chest pain, dyspnea on exertion, leg swelling, orthopnea and palpitations  Respiratory: Negative for cough and shortness of breath  Gastrointestinal: Positive for abdominal pain  Negative for nausea and vomiting  Neurological: Negative for dizziness, headaches and light-headedness  Objective:   Physical Exam  Vitals and nursing note reviewed  Constitutional:       General: He is not in acute distress  Appearance: He is obese  He is not diaphoretic  HENT:      Head: Normocephalic and atraumatic  Mouth/Throat:      Mouth: Mucous membranes are moist    Eyes:      General: No scleral icterus  Cardiovascular:      Rate and Rhythm: Normal rate and regular rhythm  Pulses: Normal pulses  Heart sounds: Normal heart sounds  No murmur heard  Pulmonary:      Effort: Pulmonary effort is normal       Breath sounds: Normal breath sounds  No wheezing or rales  Abdominal:      Palpations: Abdomen is soft  Tenderness: There is abdominal tenderness  Comments: Morbidly obese   Musculoskeletal:      Cervical back: Neck supple        Right lower leg: No edema  Left lower leg: No edema  Skin:     General: Skin is warm and dry  Capillary Refill: Capillary refill takes less than 2 seconds  Neurological:      General: No focal deficit present  Mental Status: He is alert and oriented to person, place, and time  Psychiatric:         Mood and Affect: Mood normal          Vitals: Blood pressure 94/65, pulse 81, temperature 97 7 °F (36 5 °C), resp  rate 20, height 5' 10" (1 778 m), weight (!) 256 kg (564 lb 9 6 oz), SpO2 92 %  ,     Body mass index is 81 01 kg/m²  ,   Systolic (32HQL), GQB:746 , Min:94 , MQK:338     Diastolic (25LUF), JFK:84, Min:65, Max:67      Intake/Output Summary (Last 24 hours) at 3/18/2022 0919  Last data filed at 3/18/2022 0601  Gross per 24 hour   Intake 54 17 ml   Output 1500 ml   Net -1445 83 ml     Weight (last 2 days)     Date/Time Weight    03/17/22 0544 256 (564 6)    03/16/22 0546 256 (565 04)          LABORATORY RESULTS      CBC with diff:   Results from last 7 days   Lab Units 03/16/22  0529 03/15/22  1109 03/15/22  0612 03/14/22  2120 03/14/22  0624 03/13/22  2211   WBC Thousand/uL 5 15  --  8 73  --  11 37* 9 41   HEMOGLOBIN g/dL 12 3 12 7 13 2 13 5 14 2 13 0   HEMATOCRIT % 39 9 40 8 42 3 42 0 45 7 39 9   MCV fL 95  --  94  --  95 92   PLATELETS Thousands/uL 178  --  180  --  202 175   MCH pg 29 3  --  29 4  --  29 5 29 9   MCHC g/dL 30 8*  --  31 2*  --  31 1* 32 6   RDW % 14 6  --  14 7  --  14 6 14 7   MPV fL 10 3  --  10 4  --  10 1 10 1   NRBC AUTO /100 WBCs  --   --  0  --  0 0       CMP:  Results from last 7 days   Lab Units 03/17/22  0528 03/16/22  0529 03/15/22  0612 03/14/22  0624 03/13/22  2211   POTASSIUM mmol/L 3 5 3 9 4 0 4 8 4 2   CHLORIDE mmol/L 94* 98* 102 98* 98*   CO2 mmol/L 44* >45* 43* 40* 45*   BUN mg/dL 16 19 17 16 16   CREATININE mg/dL 0 81 0 92 0 92 0 97 0 99   CALCIUM mg/dL 9 0 9 2 9 2 9 7 9 5   AST U/L  --   --   --   --  16   ALT U/L  --   --   --   --  26   ALK PHOS U/L  -- --   --   --  88   EGFR ml/min/1 73sq m 102 95 95 90 87       BMP:  Results from last 7 days   Lab Units 22  0528 22  0529 03/15/22  0612 22  0624 22  2211   POTASSIUM mmol/L 3 5 3 9 4 0 4 8 4 2   CHLORIDE mmol/L 94* 98* 102 98* 98*   CO2 mmol/L 44* >45* 43* 40* 45*   BUN mg/dL 16 19 17 16 16   CREATININE mg/dL 0 81 0 92 0 92 0 97 0 99   CALCIUM mg/dL 9 0 9 2 9 2 9 7 9 5       Lab Results   Component Value Date    NTBNP 752 (H) 2022    NTBNP 607 (H) 2022    NTBNP 1,058 (H) 2021        Results from last 7 days   Lab Units 22  0528   MAGNESIUM mg/dL 2 1                   Results from last 7 days   Lab Units 22  2211   INR  1 01       Lipid Profile:   No results found for: CHOL  Lab Results   Component Value Date    HDL 44 2016     Lab Results   Component Value Date    LDLCALC 80 2016     Lab Results   Component Value Date    TRIG 197 (H) 2016       Cardiac testing:   Results for orders placed during the hospital encounter of 05/10/21    Echo complete with contrast if indicated    [address]    Transthoracic Echocardiogram  2D, M-mode, Doppler, and Color Doppler    Study date:  11-May-2021    Patient: Neli Moyer  MR number: [id number]  Account number: [de-identified]  : 1970  Age: 49 years  Gender: Male  Status: Outpatient  Location: [facility]  Height: 70 in  Weight: 409 2 lb  BP: 121/ 53 mmHg    Indications: Heart failure    Diagnoses: I50 9 - Heart failure, unspecified    Sonographer:  Luann Romberg, RCS  Referring Physician:  Tina Jenkins MD  Group:  [facility]  Interpreting Physician:  Collette Nail, MD    SUMMARY    PROCEDURE INFORMATION:  This was a technically difficult study  LEFT VENTRICLE:  Systolic function was normal  Ejection fraction was estimated to be 60 %    This study was inadequate for the evaluation of regional wall motion  HISTORY: PRIOR HISTORY: afib, CHF, morbid obesity, DVT, COPD, DM, hypothyroid, MRSA    PROCEDURE: The study was performed in the [facility]  This was a routine study  The transthoracic approach was used  The study included complete 2D imaging, M-mode, complete spectral Doppler, and color Doppler  The heart rate was 86  bpm, at the start of the study  Images were obtained from the parasternal, apical, subcostal, and suprasternal notch acoustic windows  Echocardiographic views were limited due to restricted patient mobility, poor acoustic window  availability, decreased penetration, and lung interference  This was a technically difficult study  LEFT VENTRICLE: Size was normal  Systolic function was normal  Ejection fraction was estimated to be 60 %  This study was inadequate for the evaluation of regional wall motion  Wall thickness could not be accurately determined  DOPPLER:  The study was not technically sufficient to allow evaluation of LV diastolic function  RIGHT VENTRICLE: Systolic function was normal  Not well visualized  LEFT ATRIUM: Not well visualized  RIGHT ATRIUM: Not well visualized  MITRAL VALVE: Not well visualized  DOPPLER: The transmitral velocity was within the normal range  There was trace regurgitation  AORTIC VALVE: The valve was probably trileaflet  The valve was not well visualized  DOPPLER: Transaortic velocity was within the normal range  There was no evidence for stenosis  TRICUSPID VALVE: Not well visualized  DOPPLER: The transtricuspid velocity was within the normal range  There was trace regurgitation  Pulmonary artery systolic pressure was within the normal range  Estimated peak PA pressure was 27 mmHg  PULMONIC VALVE: Not well visualized  DOPPLER: The transpulmonic velocity was within the normal range  There was trace regurgitation  PERICARDIUM: Not well visualized  AORTA: Not well visualized      SYSTEM MEASUREMENT TABLES    2D  Ao Diam: 3 11 cm  LA Diam: 4 34 cm    CW  AV Vmax: 1 22 m/s  AV maxP 91 mmHg  PV Vmax: 1 13 m/s  PV maxP 13 mmHg  TR Vmax: 2 72 m/s  TR maxP 6 mmHg    [facility] Accredited Echocardiography Laboratory    Prepared and electronically signed by    Carlos Munoz MD  Signed 11-May-2021 11:37:20    No results found for this or any previous visit  No results found for this or any previous visit  No valid procedures specified  No results found for this or any previous visit        Meds/Allergies   all current active meds have been reviewed and current meds:   Current Facility-Administered Medications   Medication Dose Route Frequency    acetaminophen (TYLENOL) tablet 650 mg  650 mg Oral Q6H PRN    albuterol inhalation solution 2 5 mg  2 5 mg Nebulization Q6H PRN    aluminum-magnesium hydroxide-simethicone (MYLANTA) oral suspension 30 mL  30 mL Oral Q6H PRN    ammonium lactate (LAC-HYDRIN) 12 % cream   Topical BID    budesonide-formoterol (SYMBICORT) 160-4 5 mcg/act inhaler 2 puff  2 puff Inhalation BID    buPROPion (WELLBUTRIN XL) 24 hr tablet 450 mg  450 mg Oral Daily    cholecalciferol (VITAMIN D3) tablet 2,000 Units  2,000 Units Oral Daily    dabigatran etexilate (PRADAXA) capsule 150 mg  150 mg Oral Q12H [facility]    dicyclomine (BENTYL) tablet 20 mg  20 mg Oral 4x Daily (AC & HS)    docusate sodium (COLACE) capsule 100 mg  100 mg Oral BID    famotidine (PEPCID) tablet 20 mg  20 mg Oral Daily    furosemide (LASIX) injection 60 mg  60 mg Intravenous BID    HYDROmorphone (DILAUDID) injection 0 5 mg  0 5 mg Intravenous Q4H PRN    hydrOXYzine HCL (ATARAX) tablet 25 mg  25 mg Oral TID PRN    levothyroxine tablet 25 mcg  25 mcg Oral Early Morning    metoclopramide (REGLAN) tablet 10 mg  10 mg Oral TID AC    metoprolol tartrate (LOPRESSOR) tablet 50 mg  50 mg Oral Q12H MARY    ondansetron (ZOFRAN) injection 4 mg  4 mg Intravenous Q6H PRN    oxyCODONE (ROXICODONE) IR tablet 2 5 mg  2 5 mg Oral Q6H PRN    oxyCODONE (ROXICODONE) IR tablet 5 mg  5 mg Oral Q6H PRN    pantoprazole (PROTONIX) EC tablet 40 mg  40 mg Oral Early Morning    polyethylene glycol (MIRALAX) packet 17 g  17 g Oral Daily    sodium chloride (OCEAN) 0 65 % nasal spray 1 spray  1 spray Each Nare Q2H PRN    spironolactone (ALDACTONE) tablet 50 mg  50 mg Oral Daily              EKG personally reviewed by RADHA Meza    Assessment:  Principal Problem:    Intractable abdominal pain  Active Problems: Morbid obesity ([facility] [address] )    Obesity hypoventilation syndrome/FILIPPO    Acute on chronic diastolic congestive heart failure (HCC)    Atrial fibrillation (HCC)    COPD (chronic obstructive pulmonary disease) (HCC)    Hypothyroidism    History of DVT (deep vein thrombosis)    Acute on chronic respiratory failure with hypoxia (HCC)    Counseling / Coordination of Care  Total floor / unit time spent today 20 minutes  Greater than 50% of total time was spent with the patient and / or family counseling and / or coordination of care  ** Please Note: Dragon 360 Dictation voice to text software may have been used in the creation of this document   **

## 2022-03-18 NOTE — DISCHARGE INSTR - AVS FIRST PAGE
Dear Ruddy Guo,     It was our pleasure to care for you here at [facility]  It is our hope that we were always able to exceed the expected standards for your care during your stay  You were hospitalized due to Esophagitis  You were cared for on the [facility] 4th floor by Harvinder Oshea MD under the service of Helene Ramos MD with the [address] [place] [address] Internal Medicine Hospitalist Group who covers for your primary care physician (PCP), Nimo Harvey MD, while you were hospitalized  If you have any questions or concerns related to this hospitalization, you may contact us at [phone]  For follow up as well as any medication refills, we recommend that you follow up with your primary care physician  A registered nurse will reach out to you by phone within a few days after your discharge to answer any additional questions that you may have after going home  However, at this time we provide for you here, the most important instructions / recommendations at discharge:     Notable Medication Adjustments -   New medication: Torsemide 40 b i d  - for management of your diastolic congestive heart failure  New medication:  Pepcid 20 mg daily, Mylanta daily - for management of esophagitis   New medication:  Bentyl 20 mg 4 times a day - for abdominal pain   New medication:  MiraLax and Colace - for assistance with bowel movements  Testing Required after Discharge -   None  Important follow up information -   Schedule a follow-up appointment with Gastroenterology outpatient to be seen within 1 week  Schedule a follow-up appointment with your PCP in [facility] to be seen within 1 week  Schedule a follow-up appointment with Cardiology outpatient to be seen within a week  Other Instructions -   Referral sent for outpatient weight management   If your symptoms recur or worsen, contact your PCP as soon as possible     [facility] was unable to perform abdominal imaging on patient due to body habitus  If symptoms recur or worsen and PCP unable to assist, would recommend to arrive at [facility]  Please review this entire after visit summary as additional general instructions including medication list, appointments, activity, diet, any pertinent wound care, and other additional recommendations from your care team that may be provided for you        Sincerely,     Bailee Young MD [place]

## 2022-03-18 NOTE — DISCHARGE SUMMARY
[facility]  Discharge- Lenin Simmonsh 1970, 47 y o  male MRN: [id number]  Unit/Bed#: W -17 Encounter: [id number]  Primary Care Provider: Marshal Johnson MD   Date and time admitted to hospital: 3/13/2022  9:24 PM    * Intractable abdominal pain  Assessment & Plan  · Presentation: complaints of dull, aching generalized abdominal pain for 2 days  He denies radiation of the pain  He reports associated symptoms of nausea and vomiting  He denies fever, chills, diarrhea, constipation or urinary symptoms  · Unable to obtain CT imaging/Obstruction series due to large body habitus  · Surgery evaluated patient in ED, it was determined no surgical intervention was needed at this time  · EGD - mid-distal esophagitis  Food retention in gastric cardia  Procedure reported due to risk of aspiration  Suspect food bezoar  Gastroparesis? · Most recent EKG QTc 417  · Patient reports that experiences abdominal pain, but is responsive to pain regimen  Patient had 2 bowel movements yesterday of soft consistency  Plan  · P o  Protonix, Pepcid  · Bowel regimen - MiraLax and Colace  · Reglan Ragini - watch for Tardive dyskinesia    · Soft surgical diet, advanced as tolerated  · Gastroenterology consulted, appreciate recommendation - gastric emptying scan as outpatient  · Unable to obtain CT AP w IV  Contacted CT team at Cancer center, as they have Bariatric CT  Patient's hip measurement of greater than 80 cm  He is unable to fit in CT bore opening  Out next option would be an out of network scanner  [address] was unable to perform abdominal imaging on patient due to body habitus  · If symptoms recur or worsen and PCP unable to assist, would recommend to arrive at [facility] for possible availability of imaging that can accommodate patient       Acute on chronic respiratory failure with hypoxia (HCC)  Assessment & Plan  · Patient wears 2 L of supplemental oxygen chronically/baseline  POA, requiring 4 L with SpO2 92%  · Suspect secondary to CHF exacerbation  · COVID/Influenza/RSV negative  · Patient on 4 L O2 NC  Plan  · Wean O2 as tolerated to baseline  · On diuresis    History of DVT (deep vein thrombosis)  Assessment & Plan  · Cont Pradaxa    Hypothyroidism  Assessment & Plan  Plan  · Continue levothyroxine  COPD (chronic obstructive pulmonary disease) ([facility])  Assessment & Plan  · Not in an acute exacerbation  Plan  · Continue home regimen of Symbicort  Atrial fibrillation (HCC)  Assessment & Plan  Plan  · Rate/Rhythm Control: Metoprolol tartrate  Antiplatelet/Anticoagulation: Pradaxa - continued, cleared by GI team     Acute on chronic diastolic congestive heart failure [facility])  Assessment & Plan  Wt Readings from Last 3 Encounters:   03/17/22 (!) 256 kg (564 lb 9 6 oz)   02/22/22 (!) 242 kg (534 lb)   02/18/22 (!) 242 kg (534 lb 6 3 oz)    Chest x-ray: Large body habitus, unremarkable  Last ECHO on file from July 6901: LV systolic function was normal  Ejection fraction was estimated to be 60%  BNP: 752    Plan  Torsemide 40 mg b i d  Continue Metoprolol tartrate 50 mg b i d    Monitor intake and output, daily weights  Cardiology following, recommendations appreciated  Outpatient follow up with Cardiology     Obesity hypoventilation syndrome/FILIPPO  Assessment & Plan  · CPAP HS    Morbid obesity ([address] )  Assessment & Plan  Patient with history of gastric sleeve surgery  · Encouraged lifestyle modifications    · Referral to outpatient weight management program    Discharging Resident: Edouard Anderson MD  Attending: Robyn Burdick MD  PCP: Rahul Veliz MD  Admission Date: 3/13/2022  Discharge Date: 03/18/22    Disposition:      Short Term Rehab or SNF at [facility] at [address] to [facility] SNF:   · Not Applicable to this Patient - Not Applicable to this Patient    Reason for Admission:  Abdominal pain    Consultations During Hospital Stay:  · General surgery  · Nutrition services  · Cardiology  · Gastroenterology    Procedures Performed:     · EGD    Medication Adjustments and Discharge Medications:  · Summary of Medication Adjustments made as a result of this hospitalization:   · Diuresis with torsemide 40 b i d  In place of furosemide  · Medication Dosing Tapers - Please refer to Discharge Medication List for details on any medication dosing tapers (if applicable to patient)  · Medications being temporarily held (include recommended restart time):  None  · Discharge Medication List: See after visit summary for reconciled discharge medications  Wound Care Recommendations:  When applicable, please see wound care section of After Visit Summary  Diet Recommendations at Discharge:  Diet -        Diet Orders   (From admission, onward)             Start     Ordered    03/18/22 1123  Diet Surgical; Surgical Soft/Lite Meal; Sodium 2 GM, Fluid Restriction 1800 ML  Diet effective now        References:    Nutrtion Support Algorithm Enteral vs  Parenteral   Question Answer Comment   Diet Type Surgical    Surgical Surgical Soft/Lite Meal    Other Restriction(s): Sodium 2 GM    Other Restriction(s): Fluid Restriction 1800 ML    RD to adjust diet per protocol? Yes        03/18/22 1122    03/15/22 1405  Room Service  Once        Question:  Type of Service  Answer:  Room Service-Appropriate    03/15/22 1405              Fluid Restriction - Continue Fluid Restriction as Listed Above at Discharge  Instructions for any Catheters / Lines Present at Discharge (including removal date, if applicable):  None    Significant Findings / Test Results:     EGD    Result Date: 3/15/2022  Impression: Mid and distal esophagitis, scope was advanced to the gastric cardia were large amount of food was retained  The procedure was aborted at that point due to risk of aspiration    There was a copious amount of food up to his gastric cardia Suspect food bezoar RECOMMENDATION: Follow up with me in clinic  Return to floor Recommend Reglan Recommend clear liquid diet Check upper GI series  Allie Martinez MD       · No Chest XR results available for this patient  ·     Incidental Findings:   · None     Test Results Pending at Discharge (will require follow up): · None     Outpatient Tests Requested:  · None    Complications:  None    Hospital Course:     Audra Romano is a 47 y o  male patient who originally presented to the hospital on 3/13/2022 due to abdominal pain  On arrival to ED, required multiple rounds of pain medication  Patient also arrived with increased oxygen requirements from baseline of 2 L  Patient received IV Lasix which improved and was started on pain regimen  Cardiology team consulted and adjusted diuretic regimen  Due to history of ventral hernia, surgery team consulted for assistance as patient's pain was excruciating  There were no surgical interventions recommended at this time  Patient had an episode of hematemesis  Gastroenterology consulted for assistance  EGD performed showed mild distal esophagitis, with food retention in the gastric cardia suspicious for either gastroparesis versus bezoar  Patient was managed with IV ppi, pain regimen, and clear liquid diet initially with advancement  For evaluation of EGD findings, patient required imaging either upper GI see reassess or a CT of the abdomen with IV contrast   However, unable to obtain imaging as upper GI series machinery was unable to support patient's weight  Also, unable to obtain CT AP w IV  Contacted CT team at Cancer center, as they have Bariatric CT  Patient's hip measurement of greater than 80 cm (currently 97 cm)  He is unable to fit in CT bore opening  As per Gastroenterology, patient had stabilization of pain and nausea/vomiting, and would manage with diet advancement with continuation of PPI    Further imaging showed be attempted in the outpatient setting  For management of CHF, patient now on torsemide 40 mg b i d , with 2 g and 1 8 L fluid restriction diet  Patient to follow-up with PCP in [facility]  Highly recommend that if patient has worsening symptoms or recurrence of symptoms, patient should reach out to PCP, and if PCP unable to assist patient to arrive at [facility] emergency department for assessment with imaging as [address] [place] [address] does not have imaging that can accommodate patient's body habitus  Patient has been referred for outpatient weight management  On evaluation, patient is stable and cleared for discharge  Condition at Discharge: stable     Discharge Day Visit / Exam:     Subjective:  Nursing team reported no overnight events  Patient reported that abdominal pain improves with medication  Patient reports 2 bowel movement overnight  Patient denied nausea/vomiting  Patient denied chest pain, shortness of breath, cough  Vitals: Blood Pressure: 94/65 (03/18/22 0834)  Pulse: 81 (03/18/22 0834)  Temperature: 97 7 °F (36 5 °C) (03/18/22 0834)  Temp Source: Oral (03/17/22 2104)  Respirations: 20 (03/18/22 0834)  Height: 5' 10" (177 8 cm) (03/13/22 2130)  Weight - Scale: (!) 256 kg (564 lb 9 6 oz) (03/17/22 0544)  SpO2: 95 % (03/18/22 1132)  Exam:   Physical Exam  Vitals and nursing note reviewed  Constitutional:       General: He is not in acute distress  Appearance: He is morbidly obese  He is not diaphoretic  HENT:      Head: Normocephalic and atraumatic  Mouth/Throat:      Mouth: Mucous membranes are moist       Pharynx: Oropharynx is clear  Eyes:      General: No scleral icterus  Conjunctiva/sclera: Conjunctivae normal    Cardiovascular:      Rate and Rhythm: Normal rate and regular rhythm  Pulses: Normal pulses  Heart sounds: Normal heart sounds  No murmur heard  No gallop  Pulmonary:      Effort: Pulmonary effort is normal  No respiratory distress        Comments: Decreased breath sounds likely due to body habitus  Abdominal:      General: Bowel sounds are normal  There is no distension  Palpations: Abdomen is soft  Tenderness: There is abdominal tenderness (Epigastric)  Musculoskeletal:         General: No tenderness  Normal range of motion  Cervical back: Normal range of motion and neck supple  No tenderness  Right lower leg: No edema  Left lower leg: No edema  Lymphadenopathy:      Cervical: No cervical adenopathy  Skin:     General: Skin is warm and dry  Capillary Refill: Capillary refill takes less than 2 seconds  Coloration: Skin is not jaundiced or pale  Neurological:      General: No focal deficit present  Mental Status: He is alert and oriented to person, place, and time  Mental status is at baseline  Motor: No weakness  Psychiatric:         Mood and Affect: Mood normal          Behavior: Behavior normal          Thought Content: Thought content normal          Judgment: Judgment normal          Discussion with Family:  Attempted call to primary contact kaur,  was unable to reach  Goals of Care Discussions:  · Code Status at Discharge: Level 1 - Full Code  · Were there any Goals of Care Discussions during Hospitalization?: No  · Results of any General Goals of Care Discussions:    · POLST Completed: No   · If POLST Completed, Summary of POLST Agreement Provided Here:    · OK to Rehospitalize if Needed? Yes    Discharge instructions/Information to patient and family:   See after visit summary section titled Discharge Instructions for information provided to patient and family        Planned Readmission:  None      ** Please Note: This note has been constructed using a voice recognition system **

## 2022-03-21 NOTE — UTILIZATION REVIEW
Notification of Discharge   This is a Notification of Discharge from our facility [address]  Please be advised that this patient has been discharge from our facility  Below you will find the admission and discharge date and time including the patients disposition  UTILIZATION REVIEW CONTACT:  Anna Pearl  Utilization   Network Utilization Review Department  Phone: [phone] x carefully listen to the prompts  All voicemails are confidential   Email: [email or web address]     PHYSICIAN ADVISORY SERVICES:  FOR RTHE-GW-GLNG REVIEW - MEDICAL NECESSITY DENIAL  Phone: [phone]  Fax: [phone]  Email: [email or web address]     PRESENTATION DATE: 3/13/2022  9:24 PM  OBERVATION ADMISSION DATE:   INPATIENT ADMISSION DATE: 3/16/22  2:32 PM   DISCHARGE DATE: 3/18/2022  4:15 PM  DISPOSITION: Non [facility] SNF/TCU/SNU Non [facility] SNF/TCU/SNU      IMPORTANT INFORMATION:  Send all requests for admission clinical reviews, approved or denied determinations and any other requests to dedicated fax number below belonging to the campus where the patient is receiving treatment   List of dedicated fax numbers:  [address] DENIALS (Administrative/Medical Necessity) [phone]   [address] (Maternity/NICU/Pediatrics) [phone]   Umm Bapizabella [phone]   Areta Sell [phone]   Theodora Pitch [phone]   [address] [place] [address],[address] [phone]   [facility]  [phone]   [address] [place] [address], [facility]  [address] [facility] [address] [facility] [phone]

## 2022-04-03 ENCOUNTER — APPOINTMENT (EMERGENCY)
Dept: RADIOLOGY | Facility: HOSPITAL | Age: 52
DRG: 201 | End: 2022-04-03
Payer: COMMERCIAL

## 2022-04-03 ENCOUNTER — HOSPITAL ENCOUNTER (INPATIENT)
Facility: HOSPITAL | Age: 52
LOS: 6 days | Discharge: NURSING, REHAB OR LONG-TERM CARE | DRG: 201 | End: 2022-04-10
Attending: EMERGENCY MEDICINE | Admitting: INTERNAL MEDICINE
Payer: COMMERCIAL

## 2022-04-03 DIAGNOSIS — I48.91 ATRIAL FIBRILLATION WITH RAPID VENTRICULAR RESPONSE (HCC): Primary | ICD-10-CM

## 2022-04-03 DIAGNOSIS — E87.6 HYPOKALEMIA: ICD-10-CM

## 2022-04-03 DIAGNOSIS — E66.01 MORBID OBESITY (HCC): ICD-10-CM

## 2022-04-03 DIAGNOSIS — I50.32 CHRONIC DIASTOLIC CONGESTIVE HEART FAILURE (HCC): ICD-10-CM

## 2022-04-03 DIAGNOSIS — I50.9 CHF (CONGESTIVE HEART FAILURE) (HCC): ICD-10-CM

## 2022-04-03 PROBLEM — J96.11 CHRONIC RESPIRATORY FAILURE WITH HYPOXIA (HCC): Status: ACTIVE | Noted: 2021-01-01

## 2022-04-03 LAB
2HR DELTA HS TROPONIN: 0 NG/L
4HR DELTA HS TROPONIN: -1 NG/L
ALBUMIN SERPL BCP-MCNC: 2.7 G/DL (ref 3.5–5)
ALP SERPL-CCNC: 69 U/L (ref 46–116)
ALT SERPL W P-5'-P-CCNC: 24 U/L (ref 12–78)
AST SERPL W P-5'-P-CCNC: 17 U/L (ref 5–45)
ATRIAL RATE: 250 BPM
BASOPHILS # BLD AUTO: 0.03 THOUSANDS/ΜL (ref 0–0.1)
BASOPHILS NFR BLD AUTO: 1 % (ref 0–1)
BILIRUB SERPL-MCNC: 0.48 MG/DL (ref 0.2–1)
BUN SERPL-MCNC: 12 MG/DL (ref 5–25)
CALCIUM ALBUM COR SERPL-MCNC: 9.3 MG/DL (ref 8.3–10.1)
CALCIUM SERPL-MCNC: 8.3 MG/DL (ref 8.3–10.1)
CARDIAC TROPONIN I PNL SERPL HS: 5 NG/L
CARDIAC TROPONIN I PNL SERPL HS: 6 NG/L
CARDIAC TROPONIN I PNL SERPL HS: 6 NG/L
CHLORIDE SERPL-SCNC: 94 MMOL/L (ref 100–108)
CO2 SERPL-SCNC: >45 MMOL/L (ref 21–32)
CREAT SERPL-MCNC: 0.9 MG/DL (ref 0.6–1.3)
DIGOXIN SERPL-MCNC: 0.2 NG/ML (ref 0.8–2)
EOSINOPHIL # BLD AUTO: 0.08 THOUSAND/ΜL (ref 0–0.61)
EOSINOPHIL NFR BLD AUTO: 2 % (ref 0–6)
ERYTHROCYTE [DISTWIDTH] IN BLOOD BY AUTOMATED COUNT: 14.6 % (ref 11.6–15.1)
GFR SERPL CREATININE-BSD FRML MDRD: 98 ML/MIN/1.73SQ M
GLUCOSE SERPL-MCNC: 129 MG/DL (ref 65–140)
HCT VFR BLD AUTO: 40.3 % (ref 36.5–49.3)
HGB BLD-MCNC: 11.9 G/DL (ref 12–17)
IMM GRANULOCYTES # BLD AUTO: 0.02 THOUSAND/UL (ref 0–0.2)
IMM GRANULOCYTES NFR BLD AUTO: 0 % (ref 0–2)
LIPASE SERPL-CCNC: 124 U/L (ref 73–393)
LYMPHOCYTES # BLD AUTO: 0.86 THOUSANDS/ΜL (ref 0.6–4.47)
LYMPHOCYTES NFR BLD AUTO: 17 % (ref 14–44)
MAGNESIUM SERPL-MCNC: 1.7 MG/DL (ref 1.6–2.6)
MCH RBC QN AUTO: 28.9 PG (ref 26.8–34.3)
MCHC RBC AUTO-ENTMCNC: 29.5 G/DL (ref 31.4–37.4)
MCV RBC AUTO: 98 FL (ref 82–98)
MONOCYTES # BLD AUTO: 0.4 THOUSAND/ΜL (ref 0.17–1.22)
MONOCYTES NFR BLD AUTO: 8 % (ref 4–12)
NEUTROPHILS # BLD AUTO: 3.57 THOUSANDS/ΜL (ref 1.85–7.62)
NEUTS SEG NFR BLD AUTO: 72 % (ref 43–75)
NRBC BLD AUTO-RTO: 0 /100 WBCS
NT-PROBNP SERPL-MCNC: 1121 PG/ML
PHOSPHATE SERPL-MCNC: 3 MG/DL (ref 2.7–4.5)
PLATELET # BLD AUTO: 201 THOUSANDS/UL (ref 149–390)
PMV BLD AUTO: 10.1 FL (ref 8.9–12.7)
POTASSIUM SERPL-SCNC: 3.2 MMOL/L (ref 3.5–5.3)
PROT SERPL-MCNC: 7.1 G/DL (ref 6.4–8.2)
QRS AXIS: 69 DEGREES
QRSD INTERVAL: 76 MS
QT INTERVAL: 322 MS
QTC INTERVAL: 443 MS
RBC # BLD AUTO: 4.12 MILLION/UL (ref 3.88–5.62)
SODIUM SERPL-SCNC: 144 MMOL/L (ref 136–145)
T WAVE AXIS: 32 DEGREES
TSH SERPL DL<=0.05 MIU/L-ACNC: 1.98 UIU/ML (ref 0.36–3.74)
VENTRICULAR RATE: 114 BPM
WBC # BLD AUTO: 4.96 THOUSAND/UL (ref 4.31–10.16)

## 2022-04-03 PROCEDURE — 93010 ELECTROCARDIOGRAM REPORT: CPT | Performed by: INTERNAL MEDICINE

## 2022-04-03 PROCEDURE — 85025 COMPLETE CBC W/AUTO DIFF WBC: CPT

## 2022-04-03 PROCEDURE — 96374 THER/PROPH/DIAG INJ IV PUSH: CPT

## 2022-04-03 PROCEDURE — 96375 TX/PRO/DX INJ NEW DRUG ADDON: CPT

## 2022-04-03 PROCEDURE — 80053 COMPREHEN METABOLIC PANEL: CPT

## 2022-04-03 PROCEDURE — 94640 AIRWAY INHALATION TREATMENT: CPT

## 2022-04-03 PROCEDURE — 99220 PR INITIAL OBSERVATION CARE/DAY 70 MINUTES: CPT | Performed by: INTERNAL MEDICINE

## 2022-04-03 PROCEDURE — 99285 EMERGENCY DEPT VISIT HI MDM: CPT | Performed by: EMERGENCY MEDICINE

## 2022-04-03 PROCEDURE — 36415 COLL VENOUS BLD VENIPUNCTURE: CPT

## 2022-04-03 PROCEDURE — 99285 EMERGENCY DEPT VISIT HI MDM: CPT

## 2022-04-03 PROCEDURE — 94002 VENT MGMT INPAT INIT DAY: CPT

## 2022-04-03 PROCEDURE — 83690 ASSAY OF LIPASE: CPT | Performed by: EMERGENCY MEDICINE

## 2022-04-03 PROCEDURE — 93005 ELECTROCARDIOGRAM TRACING: CPT

## 2022-04-03 PROCEDURE — 83880 ASSAY OF NATRIURETIC PEPTIDE: CPT | Performed by: EMERGENCY MEDICINE

## 2022-04-03 PROCEDURE — 84443 ASSAY THYROID STIM HORMONE: CPT | Performed by: PHYSICIAN ASSISTANT

## 2022-04-03 PROCEDURE — 94760 N-INVAS EAR/PLS OXIMETRY 1: CPT

## 2022-04-03 PROCEDURE — 71045 X-RAY EXAM CHEST 1 VIEW: CPT

## 2022-04-03 PROCEDURE — 84484 ASSAY OF TROPONIN QUANT: CPT

## 2022-04-03 PROCEDURE — 84100 ASSAY OF PHOSPHORUS: CPT | Performed by: EMERGENCY MEDICINE

## 2022-04-03 PROCEDURE — 83735 ASSAY OF MAGNESIUM: CPT | Performed by: EMERGENCY MEDICINE

## 2022-04-03 PROCEDURE — 80162 ASSAY OF DIGOXIN TOTAL: CPT | Performed by: PHYSICIAN ASSISTANT

## 2022-04-03 PROCEDURE — 94664 DEMO&/EVAL PT USE INHALER: CPT

## 2022-04-03 RX ORDER — POLYETHYLENE GLYCOL 3350 17 G/17G
17 POWDER, FOR SOLUTION ORAL DAILY PRN
Status: DISCONTINUED | OUTPATIENT
Start: 2022-04-03 | End: 2022-04-08

## 2022-04-03 RX ORDER — LEVOTHYROXINE SODIUM 0.03 MG/1
25 TABLET ORAL
Status: DISCONTINUED | OUTPATIENT
Start: 2022-04-04 | End: 2022-04-10 | Stop reason: HOSPADM

## 2022-04-03 RX ORDER — SPIRONOLACTONE 25 MG/1
50 TABLET ORAL DAILY
Status: DISCONTINUED | OUTPATIENT
Start: 2022-04-04 | End: 2022-04-10 | Stop reason: HOSPADM

## 2022-04-03 RX ORDER — DICYCLOMINE HCL 20 MG
20 TABLET ORAL
Status: DISCONTINUED | OUTPATIENT
Start: 2022-04-03 | End: 2022-04-10 | Stop reason: HOSPADM

## 2022-04-03 RX ORDER — ONDANSETRON 2 MG/ML
4 INJECTION INTRAMUSCULAR; INTRAVENOUS EVERY 6 HOURS PRN
Status: CANCELLED | OUTPATIENT
Start: 2022-04-03

## 2022-04-03 RX ORDER — BUDESONIDE AND FORMOTEROL FUMARATE DIHYDRATE 160; 4.5 UG/1; UG/1
2 AEROSOL RESPIRATORY (INHALATION) 2 TIMES DAILY
Status: DISCONTINUED | OUTPATIENT
Start: 2022-04-03 | End: 2022-04-10 | Stop reason: HOSPADM

## 2022-04-03 RX ORDER — PANTOPRAZOLE SODIUM 40 MG/1
40 TABLET, DELAYED RELEASE ORAL DAILY
Status: DISCONTINUED | OUTPATIENT
Start: 2022-04-04 | End: 2022-04-10 | Stop reason: HOSPADM

## 2022-04-03 RX ORDER — GABAPENTIN 300 MG/1
300 CAPSULE ORAL 2 TIMES DAILY
Status: DISCONTINUED | OUTPATIENT
Start: 2022-04-03 | End: 2022-04-06

## 2022-04-03 RX ORDER — FUROSEMIDE 10 MG/ML
80 INJECTION INTRAMUSCULAR; INTRAVENOUS
Status: DISCONTINUED | OUTPATIENT
Start: 2022-04-03 | End: 2022-04-09

## 2022-04-03 RX ORDER — DIGOXIN 125 MCG
125 TABLET ORAL DAILY
COMMUNITY
End: 2022-04-10 | Stop reason: HOSPADM

## 2022-04-03 RX ORDER — IPRATROPIUM BROMIDE AND ALBUTEROL SULFATE 2.5; .5 MG/3ML; MG/3ML
3 SOLUTION RESPIRATORY (INHALATION)
Status: DISCONTINUED | OUTPATIENT
Start: 2022-04-03 | End: 2022-04-03

## 2022-04-03 RX ORDER — MAGNESIUM HYDROXIDE/ALUMINUM HYDROXICE/SIMETHICONE 120; 1200; 1200 MG/30ML; MG/30ML; MG/30ML
30 SUSPENSION ORAL EVERY 6 HOURS PRN
Status: DISCONTINUED | OUTPATIENT
Start: 2022-04-03 | End: 2022-04-10 | Stop reason: HOSPADM

## 2022-04-03 RX ORDER — ACETAMINOPHEN 325 MG/1
975 TABLET ORAL EVERY 8 HOURS SCHEDULED
Status: DISCONTINUED | OUTPATIENT
Start: 2022-04-03 | End: 2022-04-10 | Stop reason: HOSPADM

## 2022-04-03 RX ORDER — DILTIAZEM HYDROCHLORIDE 5 MG/ML
15 INJECTION INTRAVENOUS ONCE
Status: COMPLETED | OUTPATIENT
Start: 2022-04-03 | End: 2022-04-03

## 2022-04-03 RX ORDER — LEVALBUTEROL INHALATION SOLUTION 1.25 MG/3ML
1.25 SOLUTION RESPIRATORY (INHALATION)
Status: DISCONTINUED | OUTPATIENT
Start: 2022-04-03 | End: 2022-04-10 | Stop reason: HOSPADM

## 2022-04-03 RX ORDER — MORPHINE SULFATE 4 MG/ML
4 INJECTION, SOLUTION INTRAMUSCULAR; INTRAVENOUS ONCE
Status: COMPLETED | OUTPATIENT
Start: 2022-04-03 | End: 2022-04-03

## 2022-04-03 RX ORDER — DABIGATRAN ETEXILATE 150 MG/1
150 CAPSULE, COATED PELLETS ORAL 2 TIMES DAILY
Status: DISCONTINUED | OUTPATIENT
Start: 2022-04-03 | End: 2022-04-10 | Stop reason: HOSPADM

## 2022-04-03 RX ORDER — DIGOXIN 125 MCG
125 TABLET ORAL DAILY
Status: CANCELLED | OUTPATIENT
Start: 2022-04-04

## 2022-04-03 RX ORDER — DOCUSATE SODIUM 100 MG/1
100 CAPSULE, LIQUID FILLED ORAL 2 TIMES DAILY
Status: DISCONTINUED | OUTPATIENT
Start: 2022-04-03 | End: 2022-04-08

## 2022-04-03 RX ORDER — FAMOTIDINE 20 MG/1
20 TABLET, FILM COATED ORAL DAILY
Status: DISCONTINUED | OUTPATIENT
Start: 2022-04-04 | End: 2022-04-10 | Stop reason: HOSPADM

## 2022-04-03 RX ORDER — POTASSIUM CHLORIDE 20 MEQ/1
40 TABLET, EXTENDED RELEASE ORAL ONCE
Status: COMPLETED | OUTPATIENT
Start: 2022-04-03 | End: 2022-04-03

## 2022-04-03 RX ORDER — BUPROPION HYDROCHLORIDE 150 MG/1
450 TABLET ORAL DAILY
Status: DISCONTINUED | OUTPATIENT
Start: 2022-04-04 | End: 2022-04-10 | Stop reason: HOSPADM

## 2022-04-03 RX ORDER — TORSEMIDE 20 MG/1
40 TABLET ORAL 2 TIMES DAILY
Status: DISCONTINUED | OUTPATIENT
Start: 2022-04-03 | End: 2022-04-03

## 2022-04-03 RX ORDER — LIDOCAINE HYDROCHLORIDE 20 MG/ML
15 SOLUTION OROPHARYNGEAL ONCE
Status: COMPLETED | OUTPATIENT
Start: 2022-04-03 | End: 2022-04-03

## 2022-04-03 RX ORDER — GABAPENTIN 300 MG/1
300 CAPSULE ORAL 2 TIMES DAILY
COMMUNITY
End: 2022-04-10 | Stop reason: HOSPADM

## 2022-04-03 RX ORDER — MAGNESIUM HYDROXIDE/ALUMINUM HYDROXICE/SIMETHICONE 120; 1200; 1200 MG/30ML; MG/30ML; MG/30ML
30 SUSPENSION ORAL ONCE
Status: COMPLETED | OUTPATIENT
Start: 2022-04-03 | End: 2022-04-03

## 2022-04-03 RX ORDER — POTASSIUM CHLORIDE 20 MEQ/1
40 TABLET, EXTENDED RELEASE ORAL DAILY
Status: DISCONTINUED | OUTPATIENT
Start: 2022-04-04 | End: 2022-04-07

## 2022-04-03 RX ADMIN — ACETAMINOPHEN 325MG 975 MG: 325 TABLET ORAL at 21:36

## 2022-04-03 RX ADMIN — ALUMINA, MAGNESIA, AND SIMETHICONE ORAL SUSPENSION REGULAR STRENGTH 30 ML: 1200; 1200; 120 SUSPENSION ORAL at 12:36

## 2022-04-03 RX ADMIN — DABIGATRAN ETEXILATE MESYLATE 150 MG: 150 CAPSULE ORAL at 18:39

## 2022-04-03 RX ADMIN — DOCUSATE SODIUM 100 MG: 100 CAPSULE, LIQUID FILLED ORAL at 17:11

## 2022-04-03 RX ADMIN — POTASSIUM CHLORIDE 40 MEQ: 1500 TABLET, EXTENDED RELEASE ORAL at 13:27

## 2022-04-03 RX ADMIN — IPRATROPIUM BROMIDE 0.5 MG: 0.5 SOLUTION RESPIRATORY (INHALATION) at 19:14

## 2022-04-03 RX ADMIN — LIDOCAINE HYDROCHLORIDE 15 ML: 20 SOLUTION ORAL; TOPICAL at 12:36

## 2022-04-03 RX ADMIN — ACETAMINOPHEN 325MG 975 MG: 325 TABLET ORAL at 14:32

## 2022-04-03 RX ADMIN — METOPROLOL TARTRATE 75 MG: 50 TABLET, FILM COATED ORAL at 22:00

## 2022-04-03 RX ADMIN — MORPHINE SULFATE 4 MG: 4 INJECTION INTRAVENOUS at 12:36

## 2022-04-03 RX ADMIN — GABAPENTIN 300 MG: 300 CAPSULE ORAL at 17:11

## 2022-04-03 RX ADMIN — BUDESONIDE AND FORMOTEROL FUMARATE DIHYDRATE 2 PUFF: 160; 4.5 AEROSOL RESPIRATORY (INHALATION) at 18:39

## 2022-04-03 RX ADMIN — IPRATROPIUM BROMIDE AND ALBUTEROL SULFATE 3 ML: 2.5; .5 SOLUTION RESPIRATORY (INHALATION) at 14:32

## 2022-04-03 RX ADMIN — DICYCLOMINE HYDROCHLORIDE 20 MG: 20 TABLET ORAL at 17:11

## 2022-04-03 RX ADMIN — FUROSEMIDE 80 MG: 10 INJECTION, SOLUTION INTRAMUSCULAR; INTRAVENOUS at 17:11

## 2022-04-03 RX ADMIN — DICYCLOMINE HYDROCHLORIDE 20 MG: 20 TABLET ORAL at 21:36

## 2022-04-03 RX ADMIN — LEVALBUTEROL HYDROCHLORIDE 1.25 MG: 1.25 SOLUTION RESPIRATORY (INHALATION) at 19:14

## 2022-04-03 RX ADMIN — DILTIAZEM HYDROCHLORIDE 5 MG/HR: 5 INJECTION, SOLUTION INTRAVENOUS at 20:48

## 2022-04-03 RX ADMIN — DILTIAZEM HYDROCHLORIDE 15 MG: 5 INJECTION INTRAVENOUS at 17:11

## 2022-04-03 RX ADMIN — DILTIAZEM HYDROCHLORIDE 15 MG: 5 INJECTION INTRAVENOUS at 13:47

## 2022-04-03 NOTE — ED NOTES
[facility] team 1 for bariatric specialty bed  Awaiting order to have bed ordered from a vendor   No more bariatric specialty beds available in hospital  Current one brought down to ED is non-functional       Remigio Zhao RN  04/03/22 9105

## 2022-04-03 NOTE — ASSESSMENT & PLAN NOTE
· Patient presents with chest pain and shortness of breath related to AFib RVR  Presented with heart rate of 130  Received IV Cardizem bolus with improvement of heart rate to 100 upon my exam   · Continue Lopressor however will increase to 75 mg b i d  · No longer on digoxin  · Continue Pradaxa for anticoagulation  · Cardiology evaluation  · Monitor rate control on telemetry    · Trend trops

## 2022-04-03 NOTE — ASSESSMENT & PLAN NOTE
· Body mass index is 74 63 kg/m²  · Outpatient bariatric surgery referral has been placed during a prior admission    · Dietary modifications

## 2022-04-03 NOTE — ASSESSMENT & PLAN NOTE
Lab Results   Component Value Date    HGBA1C 5 4 03/21/2022       No results for input(s): POCGLU in the last 72 hours      Blood Sugar Average: Last 72 hrs:  · A1c 5 4   · Not maintained on anti-hyperglycemic medications  · ADA diet

## 2022-04-03 NOTE — ASSESSMENT & PLAN NOTE
Wt Readings from Last 3 Encounters:   04/03/22 (!) 243 kg (535 lb 0 9 oz)   03/17/22 (!) 256 kg (564 lb 9 6 oz)   02/22/22 (!) 242 kg (534 lb)     · Very difficult to evaluate volume status however has been compliant with torsemide and does not report increased edema  · Continue torsemide 40 mg b i d  Unk Elina · Monitor intake and output, daily weights

## 2022-04-03 NOTE — H&P
[address] 1970, 47 y o  male MRN: [id number]  Unit/Bed#: ED 17 Encounter: [id number]  Primary Care Provider: Leila Riggins MD   Date and time admitted to hospital: 4/3/2022 11:42 AM    * Atrial fibrillation with RVR ([facility] [address] )  Assessment & Plan  · Patient presents with chest pain and shortness of breath related to AFib RVR  Presented with heart rate of 130  Received IV Cardizem bolus with improvement of heart rate to 100 upon my exam   · Continue Lopressor however will increase to 75 mg b i d  · No longer on digoxin  · Continue Pradaxa for anticoagulation  · Cardiology evaluation  · Monitor rate control on telemetry  · Trend trops    Chronic diastolic congestive heart failure [facility])  Assessment & Plan  Wt Readings from Last 3 Encounters:   04/03/22 (!) 243 kg (535 lb 0 9 oz)   03/17/22 (!) 256 kg (564 lb 9 6 oz)   02/22/22 (!) 242 kg (534 lb)     · Very difficult to evaluate volume status however has been compliant with torsemide and does not report increased edema  · Continue torsemide 40 mg b i d  Patsi Reil · Monitor intake and output, daily weights  Chronic respiratory failure with hypoxia (HCC)  Assessment & Plan  · Wears 4 liters/minute chronically  · At baseline, monitor respiratory status  Hypokalemia  Assessment & Plan  · Potassium upon admission 3 2  · Would keep potassium greater than 4 0 in the setting of AFib RVR  · Replete and monitor  · Increase daily potassium supplementation to 40 mg daily    Hypothyroidism  Assessment & Plan  · Check TSH  · Continue Synthroid    Obesity hypoventilation syndrome/FILIPPO  Assessment & Plan  · Continue CPAP q h s  Diabetes mellitus [facility])  Assessment & Plan  Lab Results   Component Value Date    HGBA1C 5 4 03/21/2022       No results for input(s): POCGLU in the last 72 hours      Blood Sugar Average: Last 72 hrs:  · A1c 5 4   · Not maintained on anti-hyperglycemic medications  · ADA diet    Morbid obesity [facility])  Assessment & Plan  · Body mass index is 74 63 kg/m²  · Outpatient bariatric surgery referral has been placed during a prior admission  · Dietary modifications    VTE Pharmacologic Prophylaxis: VTE Score: 7 High Risk (Score >/= 5) - Pharmacological DVT Prophylaxis Ordered: dabigatran (Pradaxa)  Sequential Compression Devices Ordered  Code Status: Prior full code  Discussion with family: Patient declined call to   Anticipated Length of Stay: Patient will be admitted on an observation basis with an anticipated length of stay of less than 2 midnights secondary to a fib RVR  Total Time for Visit, including Counseling / Coordination of Care: 70 minutes Greater than 50% of this total time spent on direct patient counseling and coordination of care  Chief Complaint: chest pain    History of Present Illness:  Josee Nye is a 47 y o  male who presents with onset of chest pain with shortness of breath this morning  Reported that he took all of his a m  Medications including Pradaxa, metoprolol  He is a resident of [address] [place] [address] Drive  Denies any fevers, chills, coughing, nausea, vomiting, diarrhea  No headache, lightheadedness, dizziness  Presented to the ER in AFib RVR, receiving Cardizem with improvement of his heart rate  Reports pain is significantly improved he does not have any shortness of breath anymore  He is admitted overnight for observation and cardiology evaluation  Review of Systems:  Review of Systems   Constitutional: Negative for activity change, appetite change, chills, fatigue and fever  HENT: Negative for congestion, rhinorrhea, sinus pressure and sore throat  Eyes: Negative for photophobia, pain and visual disturbance  Respiratory: Positive for shortness of breath  Negative for cough and wheezing  Cardiovascular: Positive for chest pain  Negative for palpitations and leg swelling     Gastrointestinal: Negative for abdominal distention, abdominal pain, constipation, diarrhea, nausea and vomiting  Endocrine: Negative for cold intolerance, heat intolerance, polydipsia and polyuria  Genitourinary: Negative for difficulty urinating, dysuria, flank pain, frequency and hematuria  Musculoskeletal: Negative for arthralgias, back pain and joint swelling  Skin: Negative for color change, pallor and rash  Allergic/Immunologic: Negative  Neurological: Negative for dizziness, syncope, weakness, light-headedness and headaches  Hematological: Negative  Psychiatric/Behavioral: Negative  Past Medical and Surgical History:   Past Medical History:   Diagnosis Date    Afib ([place] [address] )     Anemia     Anxiety     Cancer ([place] [address] )     Cardiac disease     Cellulitis     COPD (chronic obstructive pulmonary disease) ([place] [address] )     Depression     Diabetes mellitus ([place] [address] )     D[facility] thrombosis) [facility])     GERD (gastroesophageal reflux disease)     HBP (high blood pressure)     Heart failure ([place] [address] )     Hx of blood clots     Hypertension     Hypokalemia     Hypothyroid     Obesity     Psychiatric disorder        Past Surgical History:   Procedure Laterality Date    ABDOMINAL HERNIA REPAIR  05/2019    CHOLECYSTECTOMY      Lap    GASTRECTOMY SLEEVE LAPAROSCOPIC  08/2018    INCISION AND DRAINAGE OF WOUND Bilateral 12/1/2021    Procedure: INCISION AND DRAINAGE (I&D) HEAD/FACE;  Surgeon: Kj Brown MD;  Location:  MAIN OR;  Service: General    KNEE SURGERY Right     open wound, injury     LEG SURGERY Right 2009    US GUIDED VASCULAR ACCESS  8/6/2018       Meds/Allergies:  Prior to Admission medications    Medication Sig Start Date End Date Taking?  Authorizing Provider   acetaminophen (TYLENOL) 500 mg tablet Take 1,000 mg by mouth every 6 (six) hours as needed for mild pain   Yes Taty Silva MD   albuterol (2 5 mg/3 mL) 0 083 % nebulizer solution Take 2 5 mg by nebulization every 6 (six) hours as needed for wheezing or shortness of breath   Yes Historical Provider, MD   aluminum-magnesium hydroxide-simethicone (MYLANTA) 200-200-20 mg/5 mL suspension Take 30 mL by mouth every 6 (six) hours as needed for indigestion or heartburn 3/18/22  Yes Shar Gan MD   ammonium lactate (LAC-HYDRIN) 12 % cream Apply topically 2 (two) times a day 11/15/21  Yes Abena Bassett MD   budesonide-formoterol [facility]) 160-4 5 mcg/act inhaler Inhale 2 puffs 2 (two) times a day Rinse mouth after use     Yes Historical Provider, MD   buPROPion (FORFIVO XL) 450 MG 24 hr tablet Take 1 tablet (450 mg total) by mouth daily 11/16/21  Yes Abena Bassett MD   Cholecalciferol (VITAMIN D) 50 MCG (2000 UT) tablet Take 2,000 Units by mouth daily   Yes Historical Provider, MD   cyanocobalamin (VITAMIN B-12) 1000 MCG tablet Take 1,000 mcg by mouth daily   Yes Historical Provider, MD   dabigatran etexilate (PRADAXA) 150 mg capsu Take 150 mg by mouth 2 (two) times a day   Yes Historical Provider, MD   dicyclomine (BENTYL) 20 mg tablet Take 1 tablet (20 mg total) by mouth 4 (four) times a day (before meals and at bedtime) 3/18/22  Yes Shar Gan MD   digoxin (LANOXIN) 0 125 mg tablet Take 125 mcg by mouth daily   Yes Historical Provider, MD   docusate sodium (COLACE) 100 mg capsule Take 1 capsule (100 mg total) by mouth 2 (two) times a day 3/18/22  Yes Shar Gan MD   famotidine (PEPCID) 20 mg tablet Take 1 tablet (20 mg total) by mouth daily 3/19/22  Yes Shar Gan MD   gabapentin (NEURONTIN) 300 mg capsule Take 300 mg by mouth 2 (two) times a day   Yes Historical Provider, MD   hydrOXYzine pamoate (VISTARIL) 25 mg capsule Take 25 mg by mouth Three times daily as needed   Yes Historical Provider, MD   ipratropium-albuterol (DUO-NEB) 0 5-2 5 mg/3 mL nebulizer solution Take 3 mL by nebulization 4 (four) times a day   Yes Historical Provider, MD   levothyroxine 25 mcg tablet Take 25 mcg by mouth daily   Yes Historical Provider, MD metoclopramide (REGLAN) 10 mg tablet Take 1 tablet (10 mg total) by mouth 3 (three) times a day before meals 3/18/22  Yes Jenna Barfield MD   metoprolol tartrate (LOPRESSOR) 50 mg tablet Take 1 tablet (50 mg total) by mouth every 12 (twelve) hours 11/15/21  Yes Yusuf Braxton MD   nystatin (MYCOSTATIN) cream Apply topically 2 (two) times a day 11/15/21  Yes Yusuf Braxton MD   [facility] 4 mg tablet Take 1 tablet (4 mg total) by mouth every 8 (eight) hours as needed for nausea or vomiting 3/18/22  Yes Jenna Barfield MD   pantoprazole (PROTONIX) 20 mg tablet Take 2 tablets (40 mg total) by mouth daily 3/18/22  Yes Jenna Barfield MD   polyethylene glycol (MIRALAX) 17 g packet Take 17 g by mouth daily 3/19/22  Yes Jenna Barfield MD   Potassium Chloride (KCL-20 PO) Take 20 mg by mouth in the morning   Yes Historical Provider, MD   spironolactone (ALDACTONE) 50 mg tablet Take 50 mg by mouth daily   Yes Historical Provider, MD   torsemide 40 MG TABS Take 40 mg by mouth 2 (two) times a day 3/18/22  Yes Jenna Barfield MD   sodium chloride (OCEAN) 0 65 % nasal spray 1 spray into each nostril as needed for congestion 11/15/21   Yusuf Braxton MD     I have reviewed home medications using recent Epic encounter  Allergies:    Allergies   Allergen Reactions    Penicillins Hives       Social History:  Marital Status: Single   Occupation: non-contributory  Patient Pre-hospital Living Situation: [place] [address]  Patient Pre-hospital Level of Mobility: non-ambulatory/bed bound  Patient Pre-hospital Diet Restrictions: cardiac, low carb  Substance Use History:   Social History     Substance and Sexual Activity   Alcohol Use Not Currently     Social History     Tobacco Use   Smoking Status Former Smoker    Packs/day: 1 00    Years: 15 00    Pack years: 15 00   Smokeless Tobacco Never Used   Tobacco Comment    1 5ppd x 23 years, quit 2014 Social History     Substance and Sexual Activity   Drug Use No       Family History:  Family History   Problem Relation Age of Onset    Lung cancer Father     Diabetes Maternal Aunt     Heart attack Mother     Clotting disorder Mother     Hypertension Mother     Heart failure Mother     Diabetes Mother     Atrial fibrillation Mother     Sleep apnea Mother     Obesity Mother     Asthma Sister     Stroke Neg Hx     Anuerysm Neg Hx     Arrhythmia Neg Hx     Hyperlipidemia Neg Hx         pt unsure    Fainting Neg Hx        Physical Exam:     Vitals:   Blood Pressure: 129/52 (04/03/22 1400)  Pulse: 100 (04/03/22 1430)  Temperature: 98 4 °F (36 9 °C) (04/03/22 1145)  Temp Source: Oral (04/03/22 1145)  Respirations: 20 (04/03/22 1300)  Height: 5' 11" (180 3 cm) (04/03/22 1145)  Weight - Scale: (!) 243 kg (535 lb 0 9 oz) (04/03/22 1145)  SpO2: 94 % (04/03/22 1430)    Physical Exam  Vitals and nursing note reviewed  Constitutional:       General: He is not in acute distress  Appearance: Normal appearance  He is obese  Interventions: Nasal cannula in place  HENT:      Head: Normocephalic and atraumatic  Mouth/Throat:      Mouth: Mucous membranes are moist    Eyes:      General: No scleral icterus  Extraocular Movements: Extraocular movements intact  Pupils: Pupils are equal, round, and reactive to light  Cardiovascular:      Rate and Rhythm: Tachycardia present  Rhythm irregular  Heart sounds: Normal heart sounds  No murmur heard  No friction rub  Pulmonary:      Effort: Pulmonary effort is normal       Breath sounds: Normal breath sounds  No wheezing or rhonchi  Abdominal:      General: Bowel sounds are normal  There is no distension  Palpations: Abdomen is soft  Tenderness: There is no abdominal tenderness  There is no guarding  Musculoskeletal:         General: No swelling  Cervical back: Neck supple  Right lower leg: No edema        Left lower leg: No edema  Skin:     General: Skin is warm and dry  Capillary Refill: Capillary refill takes less than 2 seconds  Coloration: Skin is not jaundiced or pale  Neurological:      General: No focal deficit present  Mental Status: He is alert and oriented to person, place, and time  Mental status is at baseline  Additional Data:     Lab Results:  Results from last 7 days   Lab Units 04/03/22  1224   WBC Thousand/uL 4 96   HEMOGLOBIN g/dL 11 9*   HEMATOCRIT % 40 3   PLATELETS Thousands/uL 201   NEUTROS PCT % 72   LYMPHS PCT % 17   MONOS PCT % 8   EOS PCT % 2     Results from last 7 days   Lab Units 04/03/22  1224   SODIUM mmol/L 144   POTASSIUM mmol/L 3 2*   CHLORIDE mmol/L 94*   CO2 mmol/L >45*   BUN mg/dL 12   CREATININE mg/dL 0 90   CALCIUM mg/dL 8 3   ALBUMIN g/dL 2 7*   TOTAL BILIRUBIN mg/dL 0 48   ALK PHOS U/L 69   ALT U/L 24   AST U/L 17   GLUCOSE RANDOM mg/dL 129                       Imaging: No pertinent imaging reviewed  XR chest 1 view portable   Final Result by Domenic Blanca MD (04/03 1222)      No acute cardiopulmonary disease  Workstation performed: [id number]             EKG and Other Studies Reviewed on Admission:   · EKG: Atrial fibrillation       ** Please Note: This note has been constructed using a voice recognition system   **

## 2022-04-03 NOTE — ASSESSMENT & PLAN NOTE
· Potassium upon admission 3 2  · Would keep potassium greater than 4 0 in the setting of AFib RVR  · Replete and monitor    · Increase daily potassium supplementation to 40 mg daily

## 2022-04-03 NOTE — ED PROVIDER NOTES
History  Chief Complaint   Patient presents with    Chest Pain     cp, sob started this morning - hx of afib     64-year-old male with history of AFib, DVT status post IVC filter, morbid obesity, esophagitis presenting emergency room with epigastric abdominal pain that started this morning at 6:30 a m  Mariella Jacobs Also notes some associated shortness of breath  No fevers or chills  Some nausea no vomiting  No diarrhea  No headache or vision changes  History provided by:  Patient  Chest Pain  Pain location:  Epigastric  Pain quality: aching    Pain radiates to:  Does not radiate  Pain radiates to the back: no    Pain severity:  Moderate  Onset quality:  Gradual  Duration:  4 hours  Timing:  Constant  Progression:  Unchanged  Chronicity:  Recurrent  Context: not breathing    Relieved by:  Nothing  Worsened by:  Nothing tried  Ineffective treatments:  None tried  Associated symptoms: no abdominal pain, no back pain, no cough, no fever, no palpitations, no shortness of breath and not vomiting        Prior to Admission Medications   Prescriptions Last Dose Informant Patient Reported? Taking?    Cholecalciferol (VITAMIN D) 50 MCG (2000 UT) tablet  Outside Facility (Specify) Yes Yes   Sig: Take 2,000 Units by mouth daily   Potassium Chloride (KCL-20 PO)   Yes Yes   Sig: Take 20 mg by mouth in the morning   acetaminophen (TYLENOL) 500 mg tablet  Self Yes Yes   Sig: Take 1,000 mg by mouth every 6 (six) hours as needed for mild pain   albuterol (2 5 mg/3 mL) 0 083 % nebulizer solution  Outside Facility (Specify) Yes Yes   Sig: Take 2 5 mg by nebulization every 6 (six) hours as needed for wheezing or shortness of breath   aluminum-magnesium hydroxide-simethicone (MYLANTA) 200-200-20 mg/5 mL suspension   No Yes   Sig: Take 30 mL by mouth every 6 (six) hours as needed for indigestion or heartburn   ammonium lactate (LAC-HYDRIN) 12 % cream   No Yes   Sig: Apply topically 2 (two) times a day   buPROPion (FORFIVO XL) 450 MG 24 hr tablet   No Yes   Sig: Take 1 tablet (450 mg total) by mouth daily   budesonide-formoterol (SYMBICORT) 160-4 5 mcg/act inhaler   Yes Yes   Sig: Inhale 2 puffs 2 (two) times a day Rinse mouth after use     cyanocobalamin (VITAMIN B-12) 1000 MCG tablet  Outside Facility (Specify) Yes Yes   Sig: Take 1,000 mcg by mouth daily   dabigatran etexilate (PRADAXA) 150 mg capsu  Outside Facility (Specify) Yes Yes   Sig: Take 150 mg by mouth 2 (two) times a day   dicyclomine (BENTYL) 20 mg tablet   No Yes   Sig: Take 1 tablet (20 mg total) by mouth 4 (four) times a day (before meals and at bedtime)   digoxin (LANOXIN) 0 125 mg tablet   Yes Yes   Sig: Take 125 mcg by mouth daily   docusate sodium (COLACE) 100 mg capsule   No Yes   Sig: Take 1 capsule (100 mg total) by mouth 2 (two) times a day   famotidine (PEPCID) 20 mg tablet   No Yes   Sig: Take 1 tablet (20 mg total) by mouth daily   gabapentin (NEURONTIN) 300 mg capsule   Yes Yes   Sig: Take 300 mg by mouth 2 (two) times a day   hydrOXYzine pamoate (VISTARIL) 25 mg capsule   Yes Yes   Sig: Take 25 mg by mouth Three times daily as needed   ipratropium-albuterol (DUO-NEB) 0 5-2 5 mg/3 mL nebulizer solution  Outside Facility (Specify) Yes Yes   Sig: Take 3 mL by nebulization 4 (four) times a day   levothyroxine 25 mcg tablet  Outside Facility (Specify) Yes Yes   Sig: Take 25 mcg by mouth daily   metoclopramide (REGLAN) 10 mg tablet   No Yes   Sig: Take 1 tablet (10 mg total) by mouth 3 (three) times a day before meals   metoprolol tartrate (LOPRESSOR) 50 mg tablet   No Yes   Sig: Take 1 tablet (50 mg total) by mouth every 12 (twelve) hours   nystatin (MYCOSTATIN) cream   No Yes   Sig: Apply topically 2 (two) times a day   ondansetron (ZOFRAN) 4 mg tablet   No Yes   Sig: Take 1 tablet (4 mg total) by mouth every 8 (eight) hours as needed for nausea or vomiting   pantoprazole (PROTONIX) 20 mg tablet   No Yes   Sig: Take 2 tablets (40 mg total) by mouth daily   polyethylene glycol (MIRALAX) 17 g packet   No Yes   Sig: Take 17 g by mouth daily   sodium chloride (OCEAN) 0 65 % nasal spray   No No   Si spray into each nostril as needed for congestion   spironolactone (ALDACTONE) 50 mg tablet  Outside Facility (Specify) Yes Yes   Sig: Take 50 mg by mouth daily   torsemide 40 MG TABS   No Yes   Sig: Take 40 mg by mouth 2 (two) times a day      Facility-Administered Medications: None       Past Medical History:   Diagnosis Date    Afib ([place] [address] )     Anemia     Anxiety     Cancer ([place] [address] )     Cardiac disease     Cellulitis     COPD (chronic obstructive pulmonary disease) ([place] [address] )     Depression     Diabetes mellitus ([place] [address] )     DVT (deep venous thrombosis) (formerly [facility])     GERD (gastroesophageal reflux disease)     HBP (high blood pressure)     Heart failure (formerly [facility])     Hx of blood clots     Hypertension     Hypokalemia     Hypothyroid     Obesity     Psychiatric disorder        Past Surgical History:   Procedure Laterality Date    ABDOMINAL HERNIA REPAIR  2019    CHOLECYSTECTOMY      Lap    GASTRECTOMY SLEEVE LAPAROSCOPIC  2018    INCISION AND DRAINAGE OF WOUND Bilateral 2021    Procedure: INCISION AND DRAINAGE (I&D) HEAD/FACE;  Surgeon: Selena Munroe MD;  Location:  MAIN OR;  Service: General    KNEE SURGERY Right     open wound, injury     LEG SURGERY Right 2009    US GUIDED VASCULAR ACCESS  2018       Family History   Problem Relation Age of Onset    Lung cancer Father     Diabetes Maternal Aunt     Heart attack Mother     Clotting disorder Mother     Hypertension Mother     Heart failure Mother     Diabetes Mother     Atrial fibrillation Mother     Sleep apnea Mother     Obesity Mother     Asthma Sister     Stroke Neg Hx     Anuerysm Neg Hx     Arrhythmia Neg Hx     Hyperlipidemia Neg Hx         pt unsure    Fainting Neg Hx      I have reviewed and agree with the history as documented      E-Cigarette/Vaping    E-Cigarette Use Never User E-Cigarette/Vaping Substances    Nicotine No     THC No     CBD No     Flavoring No     Other No     Unknown No      Social History     Tobacco Use    Smoking status: Former Smoker     Packs/day: 1 00     Years: 15 00     Pack years: 15 00    Smokeless tobacco: Never Used    Tobacco comment: 1 5ppd x 23 years, quit 2014   Vaping Use    Vaping Use: Never used   Substance Use Topics    Alcohol use: Not Currently    Drug use: No       Review of Systems   Constitutional: Negative for chills and fever  HENT: Negative for ear pain and sore throat  Eyes: Negative for pain and visual disturbance  Respiratory: Negative for cough and shortness of breath  Cardiovascular: Positive for chest pain  Negative for palpitations  Gastrointestinal: Negative for abdominal pain and vomiting  Genitourinary: Negative for dysuria and hematuria  Musculoskeletal: Negative for arthralgias and back pain  Skin: Negative for color change and rash  Neurological: Negative for seizures and syncope  All other systems reviewed and are negative  Physical Exam  Physical Exam  Vitals and nursing note reviewed  Constitutional:       Appearance: He is well-developed  HENT:      Head: Normocephalic and atraumatic  Nose: Nose normal       Mouth/Throat:      Mouth: Mucous membranes are dry  Eyes:      Extraocular Movements: Extraocular movements intact  Conjunctiva/sclera: Conjunctivae normal       Pupils: Pupils are equal, round, and reactive to light  Cardiovascular:      Rate and Rhythm: Tachycardia present  Rhythm irregular  Heart sounds: No murmur heard  No systolic murmur is present  No diastolic murmur is present  No friction rub  Pulmonary:      Effort: Pulmonary effort is normal  No respiratory distress  Breath sounds: Normal breath sounds  Abdominal:      Palpations: Abdomen is soft  Tenderness: There is no abdominal tenderness  Comments: Large abdominal scar  Epigastric tenderness palpation  Musculoskeletal:      Cervical back: Neck supple  Skin:     General: Skin is warm and dry  Capillary Refill: Capillary refill takes less than 2 seconds  Neurological:      General: No focal deficit present  Mental Status: He is alert and oriented to person, place, and time           Vital Signs  ED Triage Vitals   Temperature Pulse Respirations Blood Pressure SpO2   04/03/22 1145 04/03/22 1145 04/03/22 1145 04/03/22 1149 04/03/22 1149   98 4 °F (36 9 °C) 83 20 132/86 91 %      Temp Source Heart Rate Source Patient Position - Orthostatic VS BP Location FiO2 (%)   04/03/22 1145 04/03/22 1145 -- -- --   Oral Monitor         Pain Score       04/03/22 1148       7           Vitals:    04/03/22 1145 04/03/22 1149 04/03/22 1300   BP:  132/86 147/89   Pulse: 83  (!) 110         Visual Acuity      ED Medications  Medications   morphine (PF) 4 mg/mL injection 4 mg (4 mg Intravenous Given 4/3/22 1236)   aluminum-magnesium hydroxide-simethicone (MYLANTA) oral suspension 30 mL (30 mL Oral Given 4/3/22 1236)   Lidocaine Viscous HCl (XYLOCAINE) 2 % mucosal solution 15 mL (15 mL Swish & Swallow Given 4/3/22 1236)   potassium chloride (K-DUR,KLOR-CON) CR tablet 40 mEq (40 mEq Oral Given 4/3/22 1327)   diltiazem (CARDIZEM) injection 15 mg (15 mg Intravenous Given 4/3/22 1347)       Diagnostic Studies  Results Reviewed     Procedure Component Value Units Date/Time    Comprehensive metabolic panel [[id number]]  (Abnormal) Collected: 04/03/22 1224    Lab Status: Final result Specimen: Blood from Arm, Left Updated: 04/03/22 1306     Sodium 144 mmol/L      Potassium 3 2 mmol/L      Chloride 94 mmol/L      CO2 >45 mmol/L      ANION GAP --     BUN 12 mg/dL      Creatinine 0 90 mg/dL      Glucose 129 mg/dL      Calcium 8 3 mg/dL      Corrected Calcium 9 3 mg/dL      AST 17 U/L      ALT 24 U/L      Alkaline Phosphatase 69 U/L      Total Protein 7 1 g/dL      Albumin 2 7 g/dL      Total Bilirubin 0 48 mg/dL      eGFR 98 ml/min/1 73sq m     Narrative:      National Kid[employer] Disease Foundation guidelines for Chronic Kidney Disease (CKD):     Stage 1 with normal or high GFR (GFR > 90 mL/min/1 73 square meters)    Stage 2 Mild CKD (GFR = 60-89 mL/min/1 73 square meters)    Stage 3A Moderate CKD (GFR = 45-59 mL/min/1 73 square meters)    Stage 3B Moderate CKD (GFR = 30-44 mL/min/1 73 square meters)    Stage 4 Severe CKD (GFR = 15-29 mL/min/1 73 square meters)    Stage 5 End Stage CKD (GFR <15 mL/min/1 73 square meters)  Note: GFR calculation is accurate only with a steady state creatinine    HS Troponin 0hr (reflex protocol) [[id number]]  (Normal) Collected: 04/03/22 1224    Lab Status: Final result Specimen: Blood from Arm, Left Updated: 04/03/22 1257     hs TnI 0hr 6 ng/L     HS Troponin I 2hr [[id number]]     Lab Status: No result Specimen: Blood     Magnesium [[id number]]  (Normal) Collected: 04/03/22 1224    Lab Status: Final result Specimen: Blood from Arm, Left Updated: 04/03/22 1256     Magnesium 1 7 mg/dL     Phosphorus [[id number]]  (Normal) Collected: 04/03/22 1224    Lab Status: Final result Specimen: Blood from Arm, Left Updated: 04/03/22 1256     Phosphorus 3 0 mg/dL     NT-BNP PRO [[id number]]  (Abnormal) Collected: 04/03/22 1224    Lab Status: Final result Specimen: Blood from Arm, Left Updated: 04/03/22 1256     NT-proBNP 1,121 pg/mL     Lipase [[id number]]  (Normal) Collected: 04/03/22 1224    Lab Status: Final result Specimen: Blood from Arm, Left Updated: 04/03/22 1256     Lipase 124 u/L     CBC and differential [[id number]]  (Abnormal) Collected: 04/03/22 1224    Lab Status: Final result Specimen: Blood from Arm, Left Updated: 04/03/22 1236     WBC 4 96 Thousand/uL      RBC 4 12 Million/uL      Hemoglobin 11 9 g/dL      Hematocrit 40 3 %      MCV 98 fL      MCH 28 9 pg      MCHC 29 5 g/dL      RDW 14 6 %      MPV 10 1 fL      Platelets 882 Thousands/uL      nRBC 0 /100 WBCs      Neutrophils Relative 72 %      Immat GRANS % 0 %      Lymphocytes Relative 17 %      Monocytes Relative 8 %      Eosinophils Relative 2 %      Basophils Relative 1 %      Neutrophils Absolute 3 57 Thousands/µL      Immature Grans Absolute 0 02 Thousand/uL      Lymphocytes Absolute 0 86 Thousands/µL      Monocytes Absolute 0 40 Thousand/µL      Eosinophils Absolute 0 08 Thousand/µL      Basophils Absolute 0 03 Thousands/µL                  XR chest 1 view portable   Final Result by Domenic Blanca MD (04/03 1222)      No acute cardiopulmonary disease  Workstation performed: [id number]                    Procedures  Procedures         ED Course  ED Course as of 04/03/22 1502   Sun Apr 03, 2022   1205 EKG timed 1157 shows AFib with a rate of 114, QRS 76, QTC 443ms no signs of ischemia, no ST elevation or depression  1359 Pulse(!): 110  Heart rate improved from 130-110 with IV Dilt bolus                               SBIRT 22yo+      Most Recent Value   SBIRT (25 yo +)    In order to provide better care to our patients, we are screening all of our patients for alcohol and drug use  Would it be okay to ask you these screening questions? Yes Filed at: 04/03/2022 1150   Initial Alcohol Screen: US AUDIT-C     1  How often do you have a drink containing alcohol? 0 Filed at: 04/03/2022 1150   2  How many drinks containing alcohol do you have on a typical day you are drinking? 0 Filed at: 04/03/2022 1150   3a  Male UNDER 65: How often do you have five or more drinks on one occasion? 0 Filed at: 04/03/2022 1150   3b  FEMALE Any Age, or MALE 65+: How often do you have 4 or more drinks on one occassion? 0 Filed at: 04/03/2022 1150   Audit-C Score 0 Filed at: 04/03/2022 1150   VIPIN: How many times in the past year have you    Used an illegal drug or used a prescription medication for non-medical reasons?  Never Filed at: 04/03/2022 1150                    MDM  Number of Diagnoses or Management Options  Atrial fibrillation with rapid ventricular response (HCC)  Hypokalemia  Diagnosis management comments: Despite morphine, heart rate 130, tachycardia not secondary to pain  Patient's pain is managed  AFib RVR, will treat with IV diltiazem bolus  Will admit       Amount and/or Complexity of Data Reviewed  Clinical lab tests: reviewed and ordered  Tests in the radiology section of CPT®: ordered and reviewed  Tests in the medicine section of CPT®: ordered and reviewed        Disposition  Final diagnoses:   Atrial fibrillation with rapid ventricular response ([address] )   Hypokalemia     Time reflects when diagnosis was documented in both MDM as applicable and the Disposition within this note     Time User Action Codes Description Comment    4/3/2022  1:51 PM Hal Border Add [I48 91] Atrial fibrillation with rapid ventricular response ([address] )     4/3/2022  1:52 PM [place] Border Add [E87 6] Hypokalemia       ED Disposition     ED Disposition Condition Date/Time Comment    Admit Stable Sun Apr 3, 2022  1:51 PM Case was discussed with Dr Viviana Marshall and the patient's admission status was agreed to be Admission Status: observation status to the service of Dr Briceno   Follow-up Information    None         Patient's Medications   Discharge Prescriptions    No medications on file       No discharge procedures on file      PDMP Review       Value Time User    PDMP Reviewed  Yes 3/14/2022  8:42 AM Katharine Bagley PA-C          ED Provider  Electronically Signed by           Vanessa Morales MD  04/03/22 8110       Vanessa Morales MD  04/03/22 8062

## 2022-04-04 ENCOUNTER — APPOINTMENT (INPATIENT)
Dept: NON INVASIVE DIAGNOSTICS | Facility: HOSPITAL | Age: 52
DRG: 201 | End: 2022-04-04
Payer: COMMERCIAL

## 2022-04-04 PROBLEM — E87.6 HYPOKALEMIA: Status: RESOLVED | Noted: 2021-01-01 | Resolved: 2022-01-01

## 2022-04-04 LAB
AORTIC ROOT: 4.1 CM
APICAL FOUR CHAMBER EJECTION FRACTION: 41 %
ASCENDING AORTA: 3.2 CM (ref 2.73–4.1)
AV LVOT PEAK GRADIENT: 2 MMHG
BUN SERPL-MCNC: 12 MG/DL (ref 5–25)
CALCIUM SERPL-MCNC: 8.8 MG/DL (ref 8.3–10.1)
CHLORIDE SERPL-SCNC: 94 MMOL/L (ref 100–108)
CO2 SERPL-SCNC: >45 MMOL/L (ref 21–32)
CREAT SERPL-MCNC: 0.89 MG/DL (ref 0.6–1.3)
FRACTIONAL SHORTENING: 24 % (ref 28–44)
GFR SERPL CREATININE-BSD FRML MDRD: 98 ML/MIN/1.73SQ M
GLUCOSE SERPL-MCNC: 127 MG/DL (ref 65–140)
INTERVENTRICULAR SEPTUM IN DIASTOLE (PARASTERNAL SHORT AXIS VIEW): 1.1 CM
INTERVENTRICULAR SEPTUM: 1.1 CM (ref 0.68–1.27)
LEFT ATRIUM SIZE: 5.3 CM
LEFT INTERNAL DIMENSION IN SYSTOLE: 5.1 CM (ref 3317.45–5036.01)
LEFT VENTRICULAR INTERNAL DIMENSION IN DIASTOLE: 6.7 CM (ref 7030.51–10488.28)
LEFT VENTRICULAR POSTERIOR WALL IN END DIASTOLE: 1.2 CM (ref 0.66–1.25)
LEFT VENTRICULAR STROKE VOLUME: 105 ML
LVSV (TEICH): 105 ML
MAGNESIUM SERPL-MCNC: 1.9 MG/DL (ref 1.6–2.6)
POTASSIUM SERPL-SCNC: 3.8 MMOL/L (ref 3.5–5.3)
SL CV LV EF: 55
SL CV PED ECHO LEFT VENTRICLE DIASTOLIC VOLUME (MOD BIPLANE) 2D: 231 ML
SL CV PED ECHO LEFT VENTRICLE SYSTOLIC VOLUME (MOD BIPLANE) 2D: 126 ML
SODIUM SERPL-SCNC: 142 MMOL/L (ref 136–145)
Z-SCORE OF ASCENDING AORTA: -0.63
Z-SCORE OF INTERVENTRICULAR SEPTUM IN END DIASTOLE: 0.85
Z-SCORE OF LEFT VENTRICULAR DIMENSION IN END DIASTOLE: -71.56
Z-SCORE OF LEFT VENTRICULAR DIMENSION IN END SYSTOLE: -52.86
Z-SCORE OF LEFT VENTRICULAR POSTERIOR WALL IN END DIASTOLE: 1.62

## 2022-04-04 PROCEDURE — 99222 1ST HOSP IP/OBS MODERATE 55: CPT | Performed by: NURSE PRACTITIONER

## 2022-04-04 PROCEDURE — 99232 SBSQ HOSP IP/OBS MODERATE 35: CPT | Performed by: PHYSICIAN ASSISTANT

## 2022-04-04 PROCEDURE — 36415 COLL VENOUS BLD VENIPUNCTURE: CPT | Performed by: INTERNAL MEDICINE

## 2022-04-04 PROCEDURE — 80048 BASIC METABOLIC PNL TOTAL CA: CPT | Performed by: INTERNAL MEDICINE

## 2022-04-04 PROCEDURE — 94640 AIRWAY INHALATION TREATMENT: CPT

## 2022-04-04 PROCEDURE — 93306 TTE W/DOPPLER COMPLETE: CPT | Performed by: INTERNAL MEDICINE

## 2022-04-04 PROCEDURE — 94760 N-INVAS EAR/PLS OXIMETRY 1: CPT

## 2022-04-04 PROCEDURE — 94660 CPAP INITIATION&MGMT: CPT

## 2022-04-04 PROCEDURE — 93306 TTE W/DOPPLER COMPLETE: CPT

## 2022-04-04 PROCEDURE — 83735 ASSAY OF MAGNESIUM: CPT | Performed by: INTERNAL MEDICINE

## 2022-04-04 RX ORDER — METOPROLOL TARTRATE 100 MG/1
100 TABLET ORAL EVERY 12 HOURS SCHEDULED
Status: DISCONTINUED | OUTPATIENT
Start: 2022-04-04 | End: 2022-04-10 | Stop reason: HOSPADM

## 2022-04-04 RX ORDER — MORPHINE SULFATE 4 MG/ML
4 INJECTION, SOLUTION INTRAMUSCULAR; INTRAVENOUS EVERY 4 HOURS PRN
Status: DISCONTINUED | OUTPATIENT
Start: 2022-04-04 | End: 2022-04-07

## 2022-04-04 RX ADMIN — MORPHINE SULFATE 4 MG: 4 INJECTION INTRAVENOUS at 13:05

## 2022-04-04 RX ADMIN — POTASSIUM CHLORIDE 40 MEQ: 1500 TABLET, EXTENDED RELEASE ORAL at 08:36

## 2022-04-04 RX ADMIN — FUROSEMIDE 80 MG: 10 INJECTION, SOLUTION INTRAMUSCULAR; INTRAVENOUS at 08:37

## 2022-04-04 RX ADMIN — BUPROPION HYDROCHLORIDE 450 MG: 150 TABLET, FILM COATED, EXTENDED RELEASE ORAL at 08:36

## 2022-04-04 RX ADMIN — ACETAMINOPHEN 325MG 975 MG: 325 TABLET ORAL at 06:12

## 2022-04-04 RX ADMIN — LEVALBUTEROL HYDROCHLORIDE 1.25 MG: 1.25 SOLUTION RESPIRATORY (INHALATION) at 14:17

## 2022-04-04 RX ADMIN — PANTOPRAZOLE SODIUM 40 MG: 40 TABLET, DELAYED RELEASE ORAL at 06:13

## 2022-04-04 RX ADMIN — ACETAMINOPHEN 325MG 975 MG: 325 TABLET ORAL at 21:52

## 2022-04-04 RX ADMIN — DICYCLOMINE HYDROCHLORIDE 20 MG: 20 TABLET ORAL at 21:53

## 2022-04-04 RX ADMIN — IPRATROPIUM BROMIDE 0.5 MG: 0.5 SOLUTION RESPIRATORY (INHALATION) at 14:16

## 2022-04-04 RX ADMIN — LEVALBUTEROL HYDROCHLORIDE 1.25 MG: 1.25 SOLUTION RESPIRATORY (INHALATION) at 20:35

## 2022-04-04 RX ADMIN — DOCUSATE SODIUM 100 MG: 100 CAPSULE, LIQUID FILLED ORAL at 17:05

## 2022-04-04 RX ADMIN — DABIGATRAN ETEXILATE MESYLATE 150 MG: 150 CAPSULE ORAL at 17:05

## 2022-04-04 RX ADMIN — ACETAMINOPHEN 325MG 975 MG: 325 TABLET ORAL at 13:05

## 2022-04-04 RX ADMIN — GABAPENTIN 300 MG: 300 CAPSULE ORAL at 17:05

## 2022-04-04 RX ADMIN — MORPHINE SULFATE 4 MG: 4 INJECTION INTRAVENOUS at 17:04

## 2022-04-04 RX ADMIN — MORPHINE SULFATE 4 MG: 4 INJECTION INTRAVENOUS at 21:53

## 2022-04-04 RX ADMIN — BUDESONIDE AND FORMOTEROL FUMARATE DIHYDRATE 2 PUFF: 160; 4.5 AEROSOL RESPIRATORY (INHALATION) at 17:07

## 2022-04-04 RX ADMIN — DICYCLOMINE HYDROCHLORIDE 20 MG: 20 TABLET ORAL at 12:23

## 2022-04-04 RX ADMIN — DICYCLOMINE HYDROCHLORIDE 20 MG: 20 TABLET ORAL at 06:13

## 2022-04-04 RX ADMIN — FAMOTIDINE 20 MG: 20 TABLET, FILM COATED ORAL at 08:36

## 2022-04-04 RX ADMIN — GABAPENTIN 300 MG: 300 CAPSULE ORAL at 08:36

## 2022-04-04 RX ADMIN — DOCUSATE SODIUM 100 MG: 100 CAPSULE, LIQUID FILLED ORAL at 08:37

## 2022-04-04 RX ADMIN — LEVALBUTEROL HYDROCHLORIDE 1.25 MG: 1.25 SOLUTION RESPIRATORY (INHALATION) at 08:39

## 2022-04-04 RX ADMIN — LEVOTHYROXINE SODIUM 25 MCG: 25 TABLET ORAL at 06:12

## 2022-04-04 RX ADMIN — IPRATROPIUM BROMIDE 0.5 MG: 0.5 SOLUTION RESPIRATORY (INHALATION) at 08:40

## 2022-04-04 RX ADMIN — DICYCLOMINE HYDROCHLORIDE 20 MG: 20 TABLET ORAL at 16:20

## 2022-04-04 RX ADMIN — FUROSEMIDE 80 MG: 10 INJECTION, SOLUTION INTRAMUSCULAR; INTRAVENOUS at 16:20

## 2022-04-04 RX ADMIN — METOPROLOL TARTRATE 100 MG: 100 TABLET, FILM COATED ORAL at 20:10

## 2022-04-04 RX ADMIN — IPRATROPIUM BROMIDE 0.5 MG: 0.5 SOLUTION RESPIRATORY (INHALATION) at 20:35

## 2022-04-04 RX ADMIN — SPIRONOLACTONE 50 MG: 25 TABLET ORAL at 08:36

## 2022-04-04 RX ADMIN — DABIGATRAN ETEXILATE MESYLATE 150 MG: 150 CAPSULE ORAL at 08:36

## 2022-04-04 RX ADMIN — METOPROLOL TARTRATE 75 MG: 50 TABLET, FILM COATED ORAL at 08:36

## 2022-04-04 RX ADMIN — MORPHINE SULFATE 4 MG: 4 INJECTION INTRAVENOUS at 08:37

## 2022-04-04 NOTE — ASSESSMENT & PLAN NOTE
· Potassium upon admission 3 2  · Now resolved to 3 8  · Would keep potassium greater than 4 0 in the setting of AFib RVR  · Replete and monitor    · Increase daily potassium supplementation to 40 mg daily

## 2022-04-04 NOTE — ASSESSMENT & PLAN NOTE
Wt Readings from Last 3 Encounters:   04/04/22 (!) 243 kg (535 lb 0 9 oz)   03/17/22 (!) 256 kg (564 lb 9 6 oz)   02/22/22 (!) 242 kg (534 lb)     · Very difficult to evaluate volume status however has been compliant with torsemide  · Home regimen is torsemide 40 mg b i d  Macey Ada · Was started on IV Lasix 80 mg b i d   · Monitor intake and output, daily weights     · Cardiology eval pending

## 2022-04-04 NOTE — PROGRESS NOTES
[facility]  Progress Note - [place] Moat 1970, 47 y o  male MRN: [id number]  Unit/Bed#: S -01 Encounter: [id number]  Primary Care Provider: Jaimee Venegas MD   Date and time admitted to hospital: 4/3/2022 11:42 AM    * Atrial fibrillation with RVR ([address] )  Assessment & Plan  · Patient presents with chest pain and shortness of breath related to AFib RVR  Presented with heart rate of 130  · Was briefly on IV Cardizem infusion overnight, now discontinued  · Continue Lopressor however will increase to 75 mg b i d  · No longer on digoxin  · Continue Pradaxa for anticoagulation  · Cardiology evaluation  · Monitor rate control on telemetry  · Heart rate this morning 100-110 beats per minute  · Trend trops    Acute on chronic diastolic congestive heart failure [facility])  Assessment & Plan  Wt Readings from Last 3 Encounters:   04/04/22 (!) 243 kg (535 lb 0 9 oz)   03/17/22 (!) 256 kg (564 lb 9 6 oz)   02/22/22 (!) 242 kg (534 lb)     · Very difficult to evaluate volume status however has been compliant with torsemide  · Home regimen is torsemide 40 mg b i d  Belinda Jamison · Was started on IV Lasix 80 mg b i d   · Monitor intake and output, daily weights  · Cardiology eval pending    Chronic respiratory failure with hypoxia (HCC)  Assessment & Plan  · Wears 4 liters/minute chronically  · At baseline, monitor respiratory status  Hypothyroidism  Assessment & Plan  · TSH within normal limits  · Continue Synthroid    Obesity hypoventilation syndrome/FILIPPO  Assessment & Plan  · Continue CPAP q h s  Diabetes mellitus [facility])  Assessment & Plan  Lab Results   Component Value Date    HGBA1C 5 4 03/21/2022       No results for input(s): POCGLU in the last 72 hours  Blood Sugar Average: Last 72 hrs:  · A1c 5 4   · Not maintained on anti-hyperglycemic medications  · ADA diet    Morbid obesity (HCC)  Assessment & Plan  · Body mass index is 74 63 kg/m²     · Outpatient bariatric surgery referral has been placed during a prior admission  · Dietary modifications    Hypokalemia-resolved as of 2022  Assessment & Plan  · Potassium upon admission 3 2  · Now resolved to 3 8  · Would keep potassium greater than 4 0 in the setting of AFib RVR  · Replete and monitor  · Increase daily potassium supplementation to 40 mg daily        VTE Pharmacologic Prophylaxis: VTE Score: 7 High Risk (Score >/= 5) - Pharmacological DVT Prophylaxis Ordered: dabigatran (Pradaxa)  Sequential Compression Devices Ordered  Patient Centered Rounds: I performed bedside rounds with nursing staff today  Discussions with Specialists or Other Care Team Provider: CM, nursing    Education and Discussions with Family / Patient: Patient declined call to   Time Spent for Care: 30 minutes  More than 50% of total time spent on counseling and coordination of care as described above  Current Length of Stay: 0 day(s)  Current Patient Status: Inpatient   Certification Statement: The patient, admitted on an observation basis, will now require > 2 midnight hospital stay due to IV Cardizem infusion for rate control, IV diuresis with IV Lasix  Discharge Plan: Anticipate discharge in 24-48 hrs to prior assisted or independent living facility  Code Status: Level 1 - Full Code    Subjective:   No overnight events reported by nursing  Patient's heart rate 100-110 upon my exam this morning  Complaining of chest tightness  Objective:     Vitals:   Temp (24hrs), Av 3 °F (36 8 °C), Min:98 1 °F (36 7 °C), Max:98 4 °F (36 9 °C)    Temp:  [98 1 °F (36 7 °C)-98 4 °F (36 9 °C)] 98 1 °F (36 7 °C)  HR:  [] 103  Resp:  [20-28] 22  BP: (103-147)/(52-89) 124/76  SpO2:  [79 %-99 %] 90 %  Body mass index is 74 63 kg/m²  Input and Output Summary (last 24 hours):      Intake/Output Summary (Last 24 hours) at 2022 1000  Last data filed at 2022 0956  Gross per 24 hour   Intake --   Output 500 ml   Net -500 ml       Physical Exam:   Physical Exam  Vitals reviewed  Constitutional:       General: He is not in acute distress  Appearance: Normal appearance  He is obese  Interventions: Nasal cannula in place  HENT:      Head: Normocephalic  Mouth/Throat:      Mouth: Mucous membranes are moist    Eyes:      Pupils: Pupils are equal, round, and reactive to light  Cardiovascular:      Rate and Rhythm: Tachycardia present  Rhythm irregular  Heart sounds: No murmur heard  Pulmonary:      Effort: Pulmonary effort is normal  No respiratory distress  Breath sounds: Normal breath sounds  No wheezing, rhonchi or rales  Abdominal:      General: Bowel sounds are normal  There is no distension  Palpations: Abdomen is soft  Tenderness: There is no abdominal tenderness  There is no guarding  Musculoskeletal:         General: No deformity  Cervical back: Normal range of motion  Right lower leg: Edema present  Left lower leg: Edema present  Skin:     Capillary Refill: Capillary refill takes less than 2 seconds  Neurological:      General: No focal deficit present  Mental Status: He is alert and oriented to person, place, and time  Mental status is at baseline           Additional Data:     Labs:  Results from last 7 days   Lab Units 04/03/22  1224   WBC Thousand/uL 4 96   HEMOGLOBIN g/dL 11 9*   HEMATOCRIT % 40 3   PLATELETS Thousands/uL 201   NEUTROS PCT % 72   LYMPHS PCT % 17   MONOS PCT % 8   EOS PCT % 2     Results from last 7 days   Lab Units 04/04/22  0429 04/03/22  1224 04/03/22  1224   SODIUM mmol/L 142   < > 144   POTASSIUM mmol/L 3 8   < > 3 2*   CHLORIDE mmol/L 94*   < > 94*   CO2 mmol/L >45*   < > >45*   BUN mg/dL 12   < > 12   CREATININE mg/dL 0 89   < > 0 90   CALCIUM mg/dL 8 8   < > 8 3   ALBUMIN g/dL  --   --  2 7*   TOTAL BILIRUBIN mg/dL  --   --  0 48   ALK PHOS U/L  --   --  69   ALT U/L  --   --  24   AST U/L  --   --  17   GLUCOSE RANDOM mg/dL 127   < > 129    < > = values in this interval not displayed  Lines/Drains:  Invasive Devices  Report    Peripheral Intravenous Line            Peripheral IV 04/03/22 Left Antecubital <1 day                  Telemetry:  Telemetry Orders (From admission, onward)             48 Hour Telemetry Monitoring  Continuous x 48 hours        References:    Telemetry Guidelines   Question:  Reason for 48 Hour Telemetry  Answer:  Arrhythmias Requiring Medical Therapy (eg  SVT, Vtach/fib, Bradycardia, Uncontrolled A-fib)                 Telemetry Reviewed: Atrial fibrillation  HR averaging 105  Indication for Continued Telemetry Use: Arrthymias requiring medical therapy             Imaging: No pertinent imaging reviewed      Recent Cultures (last 7 days):         Last 24 Hours Medication List:   Current Facility-Administered Medications   Medication Dose Route Frequency Provider Last Rate    acetaminophen  975 mg Oral [facility] Kamran Del Rio PA-C      aluminum-magnesium hydroxide-simethicone  30 mL Oral Q6H PRN Kamran Del Rio PA-C      budesonide-formoterol  2 puff Inhalation BID Kamran Del Rio PA-C      buPROPion  450 mg Oral Daily Kamran Del Rio PA-C      dabigatran etexilate  150 mg Oral BID Kamran Del Rio PA-C      dicyclomine  20 mg Oral 4x Daily (AC & HS) Kamran Del Rio PA-C      diltiazem  1-15 mg/hr Intravenous Titrated Cassia Hernandez PA-C Stopped (04/04/22 8404)    docusate sodium  100 mg Oral BID Kamran Del Rio PA-C      famotidine  20 mg Oral Daily Kamran Del Rio PA-C      furosemide  80 mg Intravenous BID (diuretic) Gabrielle Seals,       gabapentin  300 mg Oral BID Kamran Del Rio PA-C      ipratropium  0 5 mg Nebulization TID Gabrielle Seals, DO      levalbuterol  1 25 mg Nebulization TID Gabrielle Seals,       levothyroxine  25 mcg Oral Early Morning Kamran Del Rio PA-C      metoprolol tartrate  75 mg Oral Q12H [address] Kamran Del Rio PA-C      morphine injection  4 mg Intravenous Q4H PRN Riesa Silver, JIMENA      pantoprazole  40 mg Oral Daily Riesa Silver, JIMENA      polyethylene glycol  17 g Oral Daily PRN Riesa Silver, JIMENA      potassium chloride  40 mEq Oral Daily Riesa Silver, JIMENA      spironolactone  50 mg Oral Daily Riesa SilverJIMENA          Today, Patient Was Seen By: Jenifer Martínez PA-C    **Please Note: This note may have been constructed using a voice recognition system  **

## 2022-04-04 NOTE — UTILIZATION REVIEW
Initial Clinical Review    OBSERVATION WRITTEN 4/3/22 @ 1352 CONVERTED TO INPATIENT ADMISSION 4/4/22 @ 0846 DUE TO FURTHER DIAGNOSTIC WORKUP REQUIRED FOR AFIB WITH RVR, REQUIRING CONTINUOUS CARDIZEM DRIP, REQUIRING AT LEAST A 2 MIDNIGHT STAY  Admission: Date/Time/Statement:   Admission Orders (From admission, onward)     Ordered        04/04/22 0846  Inpatient Admission  Once            04/03/22 1352  Place in Observation  Once                      Orders Placed This Encounter   Procedures    Inpatient Admission     Standing Status:   Standing     Number of Occurrences:   1     Order Specific Question:   Level of Care     Answer:   Med Surg [16]     Order Specific Question:   Estimated length of stay     Answer:   More than 2 Midnights     Order Specific Question:   Certification     Answer:   I certify that inpatient services are medically necessary for this patient for a duration of greater than two midnights  See H&P and MD Progress Notes for additional information about the patient's course of treatment  ED Arrival Information     Expected Arrival Acuity    - 4/3/2022 11:41 Urgent         Means of arrival Escorted by Service Admission type    Ambulance [facility] Emergency Squad Hospitalist Urgent         Arrival complaint    SOB        Chief Complaint   Patient presents with    Chest Pain     cp, sob started this morning - hx of afib       Initial Presentation: 47 y o  male with extensive PMH presents to [facility] ED via EMS with c/o chest pain and SOB  In ED, EKG Afib w/ RVR, received cardizem and HR improved  Initially admitted under Observation status then changed to Inpatient dt continued need for Cardizem drip and additional monitoring dt Afib w/ RVR    Med surg telemetry, continue cardizem drip, cardiology consult, increase lopressor, continue home pradaxa, trend trops, continue diuretic, monitor IO and daily wts, monitor resp status and continue home O2 use, monitor electrolytes and replete prn     4/4 Cardiology Consult:  Readmission for acute on chronic hypoxic/hypercapnic respiratory failure with weight gain, and likely acute on chronic diastolic CHF, also with AFib/RVR  Echo reveals likely preserved left ventricular function although difficult study considering body habitus  Continue diuresis as planned, increase metoprolol to 100 mg twice a day, continue 2 gm Na diet with fluid restriction, strict IO and daily wts, am labs, may need repeat eval of LVEF  Date 4/5  Day 2:  Patient complaining of twitching as well as some chest tightness  Shortness of breath is improving however still present  No overnight events on telemetry, pt tachycardic, BL LE edema present  Cardiology following, cardizem drip dc'd, continue lopressor, pradaxa for [facility], continue iv lasix, monitor BMP and labs, CPAP qhs, strict IO and daily wts, continue 2 gm Na diet w/ fluid restriction  The patient will continue to require additional inpatient hospital stay due to IV Lasix for CHF exacerbation, AFib RVR with telemetry monitoring      ED Triage Vitals   Temperature Pulse Respirations Blood Pressure SpO2   04/03/22 1145 04/03/22 1145 04/03/22 1145 04/03/22 1149 04/03/22 1149   98 4 °F (36 9 °C) 83 20 132/86 91 %      Temp Source Heart Rate Source Patient Position - Orthostatic VS BP Location FiO2 (%)   04/03/22 1145 04/03/22 1145 04/03/22 2100 04/03/22 2100 04/03/22 2320   Oral Monitor Sitting Right arm 30      Pain Score       04/03/22 1148       7          Wt Readings from Last 1 Encounters:   04/05/22 (!) 238 kg (525 lb 8 oz)     Additional Vital Signs:   Date/Time Temp Pulse Resp BP MAP (mmHg) SpO2 FiO2 (%) Calculated FIO2 (%) - Nasal Cannula O2 Flow Rate (L/min) Nasal Cannula O2 Flow Rate (L/min) O2 Device O2 Interface Device Patient Position - Orthostatic VS   04/05/22 0716 -- -- -- -- -- 97 % -- -- -- -- -- -- --   04/05/22 0700 98 °F (36 7 °C) 101 20 131/57 -- 100 % -- -- -- -- Nasal cannula -- Lying   04/05/22 0629 -- -- -- -- -- 94 % 45 -- -- -- CPAP Face mask --   04/04/22 2301 -- -- -- -- -- 95 % 45 -- -- -- CPAP Face mask --   04/04/22 2205 98 1 °F (36 7 °C) 102 20 116/74 89 91 % -- -- -- -- Nasal cannula -- Lying   04/04/22 2035 -- -- -- -- -- 93 % -- -- 4 L/min -- Nasal cannula -- --   04/04/22 2010 -- 107 Abnormal  -- 156/88 -- -- -- -- -- -- -- -- --   04/04/22 1604 97 6 °F (36 4 °C) 100 20 118/55 79 97 % -- -- -- -- Nasal cannula -- Lying   04/04/22 1600 -- 103 -- 124/76 -- -- -- -- -- -- -- -- --   04/04/22 1134 -- -- -- 124/76 -- -- -- -- -- -- -- -- --   04/04/22 0840 -- -- -- -- -- 90 % -- 32 3 L/min 3 L/min Nasal cannula -- --   04/04/22 0829 98 1 °F (36 7 °C) 103 22 124/76 92 90 % -- -- -- -- Nasal cannula -- Lying   04/04/22 0700 -- 98 -- -- -- -- -- -- -- -- -- -- --   04/04/22 0630 -- 102 28 Abnormal  116/67 84 98 % -- -- -- -- CPAP -- --   04/04/22 0600 -- 100 -- 125/70 91 97 % -- -- -- -- -- -- --   04/04/22 0500 -- 102 28 Abnormal  125/68 92 97 % -- -- -- -- -- -- Sitting   04/04/22 0245 -- -- -- -- -- 95 % -- -- -- -- -- Face mask --   04/04/22 0200 -- 96 25 Abnormal  140/82 106 99 % -- -- -- -- -- -- Sitting   04/04/22 0145 -- 96 22 143/83 105 98 % -- -- -- -- -- -- Sitting   04/04/22 0127 -- -- -- -- -- 97 % -- -- -- -- -- -- --   04/04/22 0117 -- -- -- -- -- 79 % Abnormal    -- -- -- -- -- -- --   04/04/22 0115 -- 96 20 113/62 80 93 % -- -- -- -- -- -- --   04/04/22 0015 -- 100 -- 115/67 82 93 % -- -- -- -- -- -- --   04/04/22 0000 -- 102 22 115/74 88 93 % -- -- -- -- -- -- Lying   04/03/22 2320 -- -- -- -- -- 91 % 30 -- -- -- CPAP Face mask --   04/03/22 2230 -- 108 Abnormal  -- 112/53 77 92 % -- -- -- -- -- -- --   04/03/22 2200 -- 117 Abnormal  -- 122/59 -- -- -- -- -- -- -- -- --   04/03/22 2128 -- 120 Abnormal  22 131/63 87 94 % -- -- -- -- -- -- Sitting   04/03/22 2100 -- 118 Abnormal  22 103/56 73 95 % -- -- -- -- -- -- Sitting   04/03/22 2052 -- 122 Abnormal  20 120/63 86 93 % -- -- -- -- -- -- --   04/03/22 2024 -- 123 Abnormal  21 115/55 76 93 % -- -- -- -- -- -- --   04/03/22 1921 -- 112 Abnormal  20 115/67 77 93 % -- -- -- -- -- -- --   04/03/22 1830 -- 118 Abnormal  -- -- -- 91 % -- -- -- -- -- -- --   04/03/22 1800 -- 102 -- 145/75 -- 94 % -- 36 -- 4 L/min Nasal cannula -- --   04/03/22 1730 -- 98 20 -- -- 93 % -- 36 -- 4 L/min Nasal cannula -- --   04/03/22 1600 -- 108 Abnormal  20 140/70 -- 94 % -- 36 -- 4 L/min Nasal cannula -- --   04/03/22 1500 -- 102 20 135/73 93 94 % -- 36 -- 4 L/min Nasal cannula -- --   04/03/22 1430 -- 100 -- -- -- 94 % -- -- -- -- -- -- --   04/03/22 1400 -- 98 -- 129/52 75 94 % -- 36 -- 4 L/min Nasal cannula -- --   04/03/22 1330 -- 122 Abnormal  -- -- -- 94 % -- -- -- -- -- -- --   04/03/22 1300 -- 110 Abnormal  20 147/89 110 94 % -- 36 -- 4 L/min Nasal cannula -- --   04/03/22 1149 -- -- -- 132/86 -- 91 % -- 28 -- 2 L/min Nasal cannula -- --   04/03/22 1145 98 4 °F (36 9 °C) 83 20 -- -- -- -- -- -- -- -- -- --    Comments:   SpO2: patient sleeping and respiratory called to adjust settings on CPAP at 04/04/22 0117      Pertinent Labs/Diagnostic Test Results:   4/3 EKG:  Atrial fibrillation with rapid ventricular response  Nonspecific ST and T wave abnormality  Low voltage QRS  Abnormal ECG    4/4 Echo:    Left Ventricle: Left ventricular cavity size is normal  Wall thickness is normal  The left ventricular ejection fraction is 55%  Systolic function is normal  Wall motion cannot be accurately assessed      Very technically limited study due to patient body habitus  Left ventricular systolic function appears grossly normal  There is no diagnostic evidence of any severe stenotic or regurgitant valvular lesions    XR chest 1 view portable   Final Result by Martha De La Rosa MD (04/03 1222)      No acute cardiopulmonary disease                    Workstation performed: [id number]               Results from last 7 days   Lab Units 04/03/22  1224   WBC Thousand/uL 4 96 HEMOGLOBIN g/dL 11 9*   HEMATOCRIT % 40 3   PLATELETS Thousands/uL 201   NEUTROS ABS Thousands/µL 3 57     Results from last 7 days   Lab Units 04/05/22  0554 04/04/22  0429 04/03/22  1224   SODIUM mmol/L 143 142 144   POTASSIUM mmol/L 3 6 3 8 3 2*   CHLORIDE mmol/L 95* 94* 94*   CO2 mmol/L 44* >45* >45*   ANION GAP mmol/L 4  --   --    BUN mg/dL 15 12 12   CREATININE mg/dL 0 85 0 89 0 90   EGFR ml/min/1 73sq m 100 98 98   CALCIUM mg/dL 8 8 8 8 8 3   MAGNESIUM mg/dL 1 9 1 9 1 7   PHOSPHORUS mg/dL  --   --  3 0     Results from last 7 days   Lab Units 04/03/22  1224   AST U/L 17   ALT U/L 24   ALK PHOS U/L 69   TOTAL PROTEIN g/dL 7 1   ALBUMIN g/dL 2 7*   TOTAL BILIRUBIN mg/dL 0 48         Results from last 7 days   Lab Units 04/05/22  0554 04/04/22  0429 04/03/22  1224   GLUCOSE RANDOM mg/dL 130 127 129     Results from last 7 days   Lab Units 04/03/22  1624 04/03/22  1415 04/03/22  1224   HS TNI 0HR ng/L  --   --  6   HS TNI 2HR ng/L  --  6  --    HSTNI D2 ng/L  --  0  --    HS TNI 4HR ng/L 5  --   --    HSTNI D4 ng/L -1  --   --              Results from last 7 days   Lab Units 04/03/22  1224   TSH 3RD GENERATON uIU/mL 1 982     Results from last 7 days   Lab Units 04/03/22  1706   DIGOXIN LVL ng/mL 0 2*     Results from last 7 days   Lab Units 04/03/22  1224   NT-PRO BNP pg/mL 1,121*     Results from last 7 days   Lab Units 04/03/22  1224   LIPASE u/L 124     ED Treatment:   Medication Administration from 04/03/2022 1141 to 04/04/2022 0807       Date/Time Order Dose Route Action     04/03/2022 1236 morphine (PF) 4 mg/mL injection 4 mg 4 mg Intravenous Given     04/03/2022 1236 aluminum-magnesium hydroxide-simethicone (MYLANTA) oral suspension 30 mL 30 mL Oral Given     04/03/2022 1236 Lidocaine Viscous HCl (XYLOCAINE) 2 % mucosal solution 15 mL 15 mL Swish & Swallow Given     04/03/2022 1327 potassium chloride (K-DUR,KLOR-CON) CR tablet 40 mEq 40 mEq Oral Given     04/03/2022 1347 diltiazem (CARDIZEM) injection 15 mg 15 mg Intravenous Given     04/04/2022 0612 acetaminophen (TYLENOL) tablet 975 mg 975 mg Oral Given     04/03/2022 2136 acetaminophen (TYLENOL) tablet 975 mg 975 mg Oral Given     04/03/2022 1432 acetaminophen (TYLENOL) tablet 975 mg 975 mg Oral Given     04/03/2022 1432 ipratropium-albuterol (DUO-NEB) 0 5-2 5 mg/3 mL inhalation solution 3 mL 3 mL Nebulization Given     04/03/2022 1839 budesonide-formoterol (SYMBICORT) 160-4 5 mcg/act inhaler 2 puff 2 puff Inhalation Given     04/03/2022 1839 dabigatran etexilate (PRADAXA) capsule 150 mg 150 mg Oral Given     04/04/2022 0613 dicyclomine (BENTYL) tablet 20 mg 20 mg Oral Given     04/03/2022 2136 dicyclomine (BENTYL) tablet 20 mg 20 mg Oral Given     04/03/2022 1711 dicyclomine (BENTYL) tablet 20 mg 20 mg Oral Given     04/03/2022 1711 docusate sodium (COLACE) capsule 100 mg 100 mg Oral Given     04/04/2022 0613 pantoprazole (PROTONIX) EC tablet 40 mg 40 mg Oral Given     04/03/2022 2200 metoprolol tartrate (LOPRESSOR) tablet 75 mg 75 mg Oral Given     04/04/2022 0612 levothyroxine tablet 25 mcg 25 mcg Oral Given     04/03/2022 1711 gabapentin (NEURONTIN) capsule 300 mg 300 mg Oral Given     04/03/2022 1914 levalbuterol (XOPENEX) inhalation solution 1 25 mg 1 25 mg Nebulization Given     04/03/2022 1914 ipratropium (ATROVENT) 0 02 % inhalation solution 0 5 mg 0 5 mg Nebulization Given     04/03/2022 1711 furosemide (LASIX) injection 80 mg 80 mg Intravenous Given     04/03/2022 1711 diltiazem (CARDIZEM) injection 15 mg 15 mg Intravenous Given        Past Medical History:   Diagnosis Date    Afib ([facility] )     Anemia     Anxiety     Cancer ([place] [address] )     Cardiac disease     Cellulitis     COPD (chronic obstructive pulmonary disease) ([place] [address] )     Depression     Diabetes mellitus ([address] )     DVT (deep venous thrombosis) (HCC)     GERD (gastroesophageal reflux disease)     HBP (high blood pressure)     Heart failure ([facility] )     Hx of blood clots     Hypertension     Hypokalemia     Hypothyroid     Obesity     Psychiatric disorder      Present on Admission:   [facility]bid [facility] )   Diabetes mellitus ([facility] )   Obesity hypoventilation syndrome/FILIPPO   (Resolved) Hypokalemia   Acute on chronic diastolic congestive heart failure (HCC)   Chronic respiratory failure with hypoxia (HCC)   Hypothyroidism      Admitting Diagnosis: Hypokalemia [E87 6]  Chest pain [F39 4]  Chronic diastolic congestive heart failure (HCC) [I50 32]  Atrial fibrillation with rapid ventricular response (HCC) [I48 91]  Age/Sex: 47 y o  male  Admission Orders:  Scheduled Medications:  acetaminophen, 975 mg, Oral, Q8H [address]  budesonide-formoterol, 2 puff, Inhalation, BID  buPROPion, 450 mg, Oral, Daily  dabigatran etexilate, 150 mg, Oral, BID  dicyclomine, 20 mg, Oral, 4x Daily (AC & HS)  docusate sodium, 100 mg, Oral, BID  famotidine, 20 mg, Oral, Daily  furosemide, 80 mg, Intravenous, BID (diuretic)  gabapentin, 300 mg, Oral, BID  ipratropium, 0 5 mg, Nebulization, TID  levalbuterol, 1 25 mg, Nebulization, TID  levothyroxine, 25 mcg, Oral, Early Morning  metoprolol tartrate, 100 mg, Oral, Q12H MARY  pantoprazole, 40 mg, Oral, Daily  potassium chloride, 40 mEq, Oral, Daily  spironolactone, 50 mg, Oral, Daily      Continuous IV Infusions:     PRN Meds:  aluminum-magnesium hydroxide-simethicone, 30 mL, Oral, Q6H PRN  morphine injection, 4 mg, Intravenous, Q4H PRN  polyethylene glycol, 17 g, Oral, Daily PRN        IP CONSULT TO CARDIOLOGY  IP CONSULT TO NUTRITION SERVICES    Network Utilization Review Department  ATTENTION: Please call with any questions or concerns to [phone] and carefully listen to the prompts so that you are directed to the right person  All voicemails are confidential   Lio Reyes all requests for admission clinical reviews, approved or denied determinations and any other requests to dedicated fax number below belonging to the campus where the patient is receiving treatment   List of dedicated fax numbers for the Facilities:  [address] [place] [address] DENIALS (Administrative/Medical Necessity) [phone]   [address]  (Maternity/NICU/Pediatrics) [address] [facility],[address]  [address] [facility] [place] [address] [address] [place] [address] [address] [place] [address] [phone]

## 2022-04-04 NOTE — ASSESSMENT & PLAN NOTE
· Patient presents with chest pain and shortness of breath related to AFib RVR  Presented with heart rate of 130  · Was briefly on IV Cardizem infusion overnight, now discontinued  · Continue Lopressor however will increase to 75 mg b i d  · No longer on digoxin  · Continue Pradaxa for anticoagulation  · Cardiology evaluation  · Monitor rate control on telemetry    · Heart rate this morning 100-110 beats per minute  · Trend trops

## 2022-04-04 NOTE — CONSULTS
Consultation - Cardiology Team One  Yahaira Brooks 47 y o  male MRN: [id number]  Unit/Bed#: S -73 Encounter: [id number]    Inpatient consult to Cardiology  Consult performed by: RADHA Trejo  Consult ordered by: Mark Garcia PA-C      Physician Requesting Consult: Boby Villatoro DO  Reason for Consult / Principal Problem: CHF, Afib    Assessment/ Plan    1  Acute on chronic hypoxic and hypercapnic respiratory failure  Typically wears 2 L via nasal cannula, currently requiring 4 L nasal cannula  Multifactorial in setting of acute diastolic CHF and morbid obesity with OHS  2  Acute on chronic diastolic CHF  Presented with weight gain, shortness of breath, LE edema  NT proBNP 1100 (up from 700)  CXR- poor quality study, read as no acute cardiopulmonary disease  Home diuretic: torsemide 40 mg BID, spironolactone 50 mg daily  Dry weight:  Recently 519 lb per [facility] lift  Current weight: 532 lb per bed scale  I/O: 1300 mL UOP after dose of Lasix this morning  Creatinine stable, K 3 8  3  Permanent atrial fibrillation, presenting with RVR  Home AVN blockers: metoprolol tartrate 50 mg BID  OAC: Xarelto  Rates improved after IV diltiazem- currently 100s  4  Hypertension- stable, average /69  5  History of DVT s/p IVC filter- on Xarelto  6  Morbid obesity with obesity hypoventilation syndrome- BMI 74 26  7  FILIPPO    Plan  1  Continue Lasix 80 mg IV BID and spironolactone for net negative fluid balance  2  Continue increased metoprolol dose- 75 mg BID  Rates sub-optimal but may continue to improve with diuresis  Consider increasing dose to 100 mg BID vs adding diltiazem  3  Continue anticoagulation with Xarelto  4  Strict I/Os, obtain daily bed weights with everything removed from bed including telemetry box  5   2 gm Na diet with 1500 mL FR  6  AM BMP  7   Will discuss need for repeat evaluation of LVEF with attending    History of Present Illness   HPI: Yahaira Brooks is a 47y o  year old male with chronic diastolic CHF, permanent atrial fibrillation, hx of DVT s/p IVC filter, chronic anticoagulation on Xarelto, HTN, morbid obesity with obesity hypoventilation syndrome and FILIPPO with chronic hypoxic respiratory failure on 2LNC  He typically follows with cardiologist Dr Talia Stewart but has not been seen in the office in a couple of years  He was recently seen by [address] [place] [address] cardiology in March while hospitalized for acute CHF  He responded well to IV diuresis with Lasix 60 mg b i d  And was transitioned to torsemide 40 mg b i d  In addition to spironolactone 50 mg daily at discharge  About 2 days ago he noticed increasing palpitations and shortness of breath  He also thinks that his legs feel little bit more swollen than usual   He does also admit to chest pain that started around the time of his palpitations and has improved at time of my exam   On admission he was found to be in atrial fibrillation with RVR with a heart rate reportedly of 130, ECG demonstrating afib with   He was given 2 boluses of IV diltiazem and started on a diltiazem infusion which has since been turned off  Rates are in the 100s currently  His home metoprolol tartrate dose has been increased to 75 mg b i d  By the primary team   NT proBNP 1100 which is up from 700 during his previous hospital stay  High sensitivity troponin negative x3  CBC unremarkable and TSH WNL  CXR is poor quality and read as no acute cardiopulmonary disease  He was started on Lasix 80 mg IV b i d  and responding well to this  Cardiology consulted for further evaluation and management of his AFib with RVR and acute CHF  It is difficult to ascertain his volume status given his body habitus and inability to stand on a scale  He is weighed via [facility] [place] lift at the nursing facility where he resides and was most recently 519 lb about 2 days ago  He believes he has been getting all of his medications at the facility    He eats whatever food is provided to him there and does tell me that it is a low-sodium diet  He has not had additional salt to his food  He typically snacks on protein bars and raw broccoli and cauliflower which he occasionally dips in sour cream   He denies any additional salty snacks but admits that he has not checked the sodium content of the protein bars  His last echocardiogram is from May 2021 which estimated LV function at 60%  He was ordered a repeat echocardiogram in July 2021 however the study was uninterpretable and MUGA scan was recommended if further evaluation needed  EKG reviewed personally:  AFib with RVR, heart rate 114  Nonspecific ST/T-wave abnormality    Telemetry reviewed personally: Afib with RVR, -110s    Review of Systems   Constitutional: Positive for weight gain  Negative for chills and malaise/fatigue  Cardiovascular: Positive for chest pain, leg swelling and palpitations  Negative for dyspnea on exertion, orthopnea and syncope  Respiratory: Positive for shortness of breath  Negative for cough, sleep disturbances due to breathing, sputum production and wheezing  Gastrointestinal: Positive for abdominal pain (chronic)  Negative for bloating, nausea and vomiting  Neurological: Negative for dizziness, light-headedness and weakness  Psychiatric/Behavioral: Negative for altered mental status  All other systems reviewed and are negative      Historical Information   Past Medical History:   Diagnosis Date    Afib ([facility] )     Anemia     Anxiety     Cancer ([place] [address] )     Cardiac disease     Cellulitis     COPD (chronic obstructive pulmonary disease) ([facility] )     Depression     Diabetes mellitus ([facility] )     DVT (deep venous thrombosis) (HCC)     GERD (gastroesophageal reflux disease)     HBP (high blood pressure)     Heart failure (HCC)     Hx of blood clots     Hypertension     Hypokalemia     Hypothyroid     Obesity     Psychiatric disorder      Past Surgical History:   Procedure Laterality Date    ABDOMINAL HERNIA REPAIR  05/2019    CHOLECYSTECTOMY      Lap    GASTRECTOMY SLEEVE LAPAROSCOPIC  08/2018    INCISION AND DRAINAGE OF WOUND Bilateral 12/1/2021    Procedure: INCISION AND DRAINAGE (I&D) HEAD/FACE;  Surgeon: Camryn Medina MD;  Location: [facility]IN [facility];  Service: General    KNEE SURGERY Right     open wound, injury     LEG SURGERY Right 2009    US GUIDED VASCULAR ACCESS  8/6/2018     Social History     Substance and Sexual Activity   Alcohol Use Not Currently     Social History     Substance and Sexual Activity   Drug Use No     Social History     Tobacco Use   Smoking Status Former Smoker    Packs/day: 1 00    Years: 15 00    Pack years: 15 00   Smokeless Tobacco Never Used   Tobacco Comment    1 5ppd x 23 years, quit 2014     Family History:   Family History   Problem Relation Age of Onset   Madina Arceo Lung cancer Father     Diabetes Maternal Aunt     Heart attack Mother     Clotting disorder Mother     Hypertension Mother     Heart failure Mother     Diabetes Mother     Atrial fibrillation Mother     Sleep apnea Mother     Obesity Mother     Asthma Sister     Stroke Neg Hx     Anuerysm Neg Hx     Arrhythmia Neg Hx     Hyperlipidemia Neg Hx         pt unsure    Fainting Neg Hx        Meds/Allergies   all current active meds have been reviewed and current meds:   Current Facility-Administered Medications   Medication Dose Route Frequency    acetaminophen (TYLENOL) tablet 975 mg  975 mg Oral Q8H [facility]    aluminum-magnesium hydroxide-simethicone (MYLANTA) oral suspension 30 mL  30 mL Oral Q6H PRN    budesonide-formoterol (SYMBICORT) 160-4 5 mcg/act inhaler 2 puff  2 puff Inhalation BID    buPROPion (WELLBUTRIN XL) 24 hr tablet 450 mg  450 mg Oral Daily    dabigatran etexilate (PRADAXA) capsule 150 mg  150 mg Oral BID    dicyclomine (BENTYL) tablet 20 mg  20 mg Oral 4x Daily (AC & HS)    diltiazem (CARDIZEM) 125 mg in sodium chloride 0 9 % 125 mL infusion  1-15 mg/hr Intravenous Titrated    docusate sodium (COLACE) capsule 100 mg  100 mg Oral BID    famotidine (PEPCID) tablet 20 mg  20 mg Oral Daily    furosemide (LASIX) injection 80 mg  80 mg Intravenous BID (diuretic)    gabapentin (NEURONTIN) capsule 300 mg  300 mg Oral BID    ipratropium (ATROVENT) 0 02 % inhalation solution 0 5 mg  0 5 mg Nebulization TID    levalbuterol (XOPENEX) inhalation solution 1 25 mg  1 25 mg Nebulization TID    levothyroxine tablet 25 mcg  25 mcg Oral Early Morning    metoprolol tartrate (LOPRESSOR) tablet 75 mg  75 mg Oral Q12H MARY    morphine (PF) 4 mg/mL injection 4 mg  4 mg Intravenous Q4H PRN    pantoprazole (PROTONIX) EC tablet 40 mg  40 mg Oral Daily    polyethylene glycol (MIRALAX) packet 17 g  17 g Oral Daily PRN    potassium chloride (K-DUR,KLOR-CON) CR tablet 40 mEq  40 mEq Oral Daily    spironolactone (ALDACTONE) tablet 50 mg  50 mg Oral Daily     diltiazem, 1-15 mg/hr, Last Rate: Stopped (04/04/22 2293)      Allergies   Allergen Reactions    Penicillins Hives       Objective   Vitals: Blood pressure 124/76, pulse 103, temperature 98 1 °F (36 7 °C), temperature source Oral, resp  rate 22, height 5' 11" (1 803 m), weight (!) 243 kg (535 lb 0 9 oz), SpO2 90 %  , Body mass index is 74 63 kg/m²  ,     Systolic (58IMA), MQH:181 , Min:103 , FYQ:484     Diastolic (47SIP), NCR:57, Min:52, Max:89    Intake/Output Summary (Last 24 hours) at 4/4/2022 1114  Last data filed at 4/4/2022 0956  Gross per 24 hour   Intake --   Output 500 ml   Net -500 ml     Wt Readings from Last 3 Encounters:   04/04/22 (!) 243 kg (535 lb 0 9 oz)   03/17/22 (!) 256 kg (564 lb 9 6 oz)   02/22/22 (!) 242 kg (534 lb)     Invasive Devices  Report    Peripheral Intravenous Line            Peripheral IV 04/03/22 Left Antecubital <1 day              Physical Exam  Vitals reviewed  Constitutional:       General: He is not in acute distress  Appearance: He is obese     Neck:      Comments: Unable to assess JVD secondary to body habitus  Cardiovascular:      Rate and Rhythm: Tachycardia present  Rhythm irregularly irregular  Heart sounds: Heart sounds are distant  No murmur heard  No friction rub  No gallop  Pulmonary:      Effort: Pulmonary effort is normal  No respiratory distress  Breath sounds: No rales  Comments: Clear breath sounds, diminished at bases  On 4LNC  Abdominal:      General: Bowel sounds are normal  There is no distension  Palpations: Abdomen is soft  Tenderness: There is no abdominal tenderness  Hernia: A hernia is present  Musculoskeletal:         General: No tenderness  Normal range of motion  Cervical back: Neck supple  Right lower leg: Edema present  Left lower leg: Edema present  Skin:     General: Skin is warm and dry  Findings: Erythema (Bilateral lower extremities) present  Neurological:      Mental Status: He is alert and oriented to person, place, and time     Psychiatric:         Mood and Affect: Mood normal      LABORATORY RESULTS:      CBC with diff:   Results from last 7 days   Lab Units 04/03/22  1224   WBC Thousand/uL 4 96   HEMOGLOBIN g/dL 11 9*   HEMATOCRIT % 40 3   MCV fL 98   PLATELETS Thousands/uL 201   MCH pg 28 9   MCHC g/dL 29 5*   RDW % 14 6   MPV fL 10 1   NRBC AUTO /100 WBCs 0     CMP:  Results from last 7 days   Lab Units 04/04/22  0429 04/03/22  1224   POTASSIUM mmol/L 3 8 3 2*   CHLORIDE mmol/L 94* 94*   CO2 mmol/L >45* >45*   BUN mg/dL 12 12   CREATININE mg/dL 0 89 0 90   CALCIUM mg/dL 8 8 8 3   AST U/L  --  17   ALT U/L  --  24   ALK PHOS U/L  --  69   EGFR ml/min/1 73sq m 98 98     BMP:  Results from last 7 days   Lab Units 04/04/22  0429 04/03/22  1224   POTASSIUM mmol/L 3 8 3 2*   CHLORIDE mmol/L 94* 94*   CO2 mmol/L >45* >45*   BUN mg/dL 12 12   CREATININE mg/dL 0 89 0 90   CALCIUM mg/dL 8 8 8 3     Lab Results   Component Value Date    CREATININE 0 89 04/04/2022    CREATININE 0 90 04/03/2022    CREATININE 0 81 2022     Lab Results   Component Value Date    NTBNP 1,121 (H) 2022    NTBNP 752 (H) 2022    NTBNP 607 (H) 2022      Results from last 7 days   Lab Units 22  0429 22  1224   MAGNESIUM mg/dL 1 9 1 7      Results from last 7 days   Lab Units 22  1224   TSH 3RD GENERATON uIU/mL 1 982     Lipid Profile:   No results found for: CHOL  Lab Results   Component Value Date    HDL 44 2016     Lab Results   Component Value Date    LDLCALC 80 2016     Lab Results   Component Value Date    TRIG 197 (H) 2016     Cardiac testing:   Results for orders placed during the hospital encounter of 05/10/21    Echo complete with contrast if indicated    Narrative  [facility] [address]    Transthoracic Echocardiogram  2D, M-mode, Doppler, and Color Doppler    Study date:  11-May-2021    Patient: Tahira Short  MR number: [id number]  Account number: [de-identified]  : 1970  Age: 49 years  Gender: Male  Status: Outpatient  Location: [facility]  Height: 70 in  Weight: 409 2 lb  BP: 121/ 53 mmHg    Indications: Heart failure    Diagnoses: I50 9 - Heart failure, unspecified    Sonographer:  JOSE Castillo  Referring Physician:  CarlosA lberto Castro MD  Group:  [facility]  Interpreting Physician:  Brigid Horne MD    SUMMARY    PROCEDURE INFORMATION:  This was a technically difficult study  LEFT VENTRICLE:  Systolic function was normal  Ejection fraction was estimated to be 60 %  This study was inadequate for the evaluation of regional wall motion  HISTORY: PRIOR HISTORY: afib, CHF, morbid obesity, DVT, COPD, DM, hypothyroid, MRSA    PROCEDURE: The study was performed in the [facility]  This was a routine study  The transthoracic approach was used  The study included complete 2D imaging, M-mode, complete spectral Doppler, and color Doppler   The heart rate was 86  bpm, at the start of the study  Images were obtained from the parasternal, apical, subcostal, and suprasternal notch acoustic windows  Echocardiographic views were limited due to restricted patient mobility, poor acoustic window  availability, decreased penetration, and lung interference  This was a technically difficult study  LEFT VENTRICLE: Size was normal  Systolic function was normal  Ejection fraction was estimated to be 60 %  This study was inadequate for the evaluation of regional wall motion  Wall thickness could not be accurately determined  DOPPLER:  The study was not technically sufficient to allow evaluation of LV diastolic function  RIGHT VENTRICLE: Systolic function was normal  Not well visualized  LEFT ATRIUM: Not well visualized  RIGHT ATRIUM: Not well visualized  MITRAL VALVE: Not well visualized  DOPPLER: The transmitral velocity was within the normal range  There was trace regurgitation  AORTIC VALVE: The valve was probably trileaflet  The valve was not well visualized  DOPPLER: Transaortic velocity was within the normal range  There was no evidence for stenosis  TRICUSPID VALVE: Not well visualized  DOPPLER: The transtricuspid velocity was within the normal range  There was trace regurgitation  Pulmonary artery systolic pressure was within the normal range  Estimated peak PA pressure was 27 mmHg  PULMONIC VALVE: Not well visualized  DOPPLER: The transpulmonic velocity was within the normal range  There was trace regurgitation  PERICARDIUM: Not well visualized  AORTA: Not well visualized      SYSTEM MEASUREMENT TABLES    2D  Ao Diam: 3 11 cm  LA Diam: 4 34 cm    CW  AV Vmax: 1 22 m/s  AV maxP 91 mmHg  PV Vmax: 1 13 m/s  PV maxP 13 mmHg  TR Vmax: 2 72 m/s  TR maxP 6 mmHg    [employer] Accredited Echocardiography Laboratory    Prepared and electronically signed by    Erendira Kelly MD  Signed 11-May-2021 11:37:20    Imaging: I have personally reviewed pertinent reports  and I have personally reviewed pertinent films in PACS  EGD    Result Date: 3/15/2022  Narrative: [place] [id number] [phone] DATE OF SERVICE: 3/15/22 PHYSICIAN(S): Rigo Minaya MD Proceduralist INDICATION: Hematemesis, Nausea and vomiting POST-OP DIAGNOSIS: See the impression below  PREPROCEDURE: Informed consent was obtained for the procedure, including sedation  Risks of perforation, hemorrhage, adverse drug reaction and aspiration were discussed  The patient was placed in the left lateral decubitus position  Patient was explained about the risks and benefits of the procedure  Risks including but not limited to bleeding, infection, and perforation were explained in detail  Also explained about less than 100% sensitivity with the exam and other alternatives  DETAILS OF PROCEDURE: Patient was taken to the procedure room where a time out was performed to confirm correct patient and correct procedure  The patient underwent monitored anesthesia care, which was administered by an anesthesia professional  The patient's blood pressure, heart rate, level of consciousness, respirations and oxygen were monitored throughout the procedure  The scope was advanced to the stomach  Retroflexion was performed in the fundus  The patient experienced no blood loss  The procedure was not difficult  The patient tolerated the procedure well  There were no apparent complications   ANESTHESIA INFORMATION: ASA: IV Anesthesia Type: IV Sedation with Anesthesia MEDICATIONS: No administrations occurring from 1525 to 1534 on 03/15/22 FINDINGS: Grade B esophagitis with mucosal breaks measuring 5 mm or more not continuous between folds, covering less than 75% of the circumference in the GE junction Foreign body in the cardia SPECIMENS: * No specimens in log *     Impression: Mid and distal esophagitis, scope was advanced to the gastric cardia were large amount of food was retained  The procedure was aborted at that point due to risk of aspiration  There was a copious amount of food up to his gastric cardia Suspect food bezoar RECOMMENDATION: Follow up with me in clinic  Return to floor Recommend Reglan Recommend clear liquid diet Check upper GI series  Franki Hoffmann MD     XR chest 1 view portable    Result Date: 4/3/2022  Narrative: CHEST INDICATION:   cp  COMPARISON:  Chest radiograph from 3/13/2022 and 2/18/2022, chest CT from 5/10/2021  EXAM PERFORMED/VIEWS:  XR CHEST PORTABLE FINDINGS: Cardiomegaly  No definite acute disease but interpretation is markedly compromised by large body habitus  No effusion or pneumothorax  Osseous structures appear within normal limits for patient age  Impression: No acute cardiopulmonary disease  Workstation performed: [id number]     XR chest 1 view portable    Result Date: 3/14/2022  Narrative: CHEST INDICATION:   nausea and vomiting  COMPARISON:  February 18, 2022 EXAM PERFORMED/VIEWS:  XR CHEST PORTABLE FINDINGS:  Mildly limited evaluation due to body habitus  Cardiomediastinal silhouette appears unremarkable  The lungs are clear  No pneumothorax or pleural effusion  Osseous structures appear within normal limits for patient age  Impression: No acute cardiopulmonary disease  Workstation performed: [id number]     VAS lower limb venous duplex study, complete bilateral    Result Date: 3/14/2022  Narrative:  [facility] REPORT CLINICAL: Indications: Patient presents with chronic bilateral lower extremity pain and swelling > 1 year  Operative History: Patient denies any cardiovascular surgeries Risk Factors The patient has history of Obesity, HTN and Diabetes (NIDDM (Diet))  CONCLUSION:  Impression:  RIGHT LOWER LIMB: No evidence of acute or chronic deep vein thrombosis  No evidence of superficial thrombophlebitis noted  Doppler evaluation shows a normal response to augmentation maneuvers   Popliteal, posterior tibial and anterior tibial arterial Doppler waveforms are triphasic  LEFT LOWER LIMB: No evidence of acute or chronic deep vein thrombosis  No evidence of superficial thrombophlebitis noted  Doppler evaluation shows a normal response to augmentation maneuvers  Popliteal, posterior tibial and anterior tibial arterial Doppler waveforms are triphasic  Technically difficult/limited study to body habitus, pitting edema and poor visualization of calf veins  Technical findings were faxed to chart  SIGNATURE: Electronically Signed byDes Noel on 2022-03-14 08:35:48 PM    Thank you for allowing us to participate in this patient's care  This pt will follow up with Dr Marycarmen Blank once discharged  Counseling / Coordination of Care  Total floor / unit time spent today 45 minutes  Greater than 50% of total time was spent with the patient and / or family counseling and / or coordination of care  A description of the counseling / coordination of care: Review of history, current assessment, development of a plan  Code Status: Level 1 - Full Code    ** Please Note: Dragon 360 Dictation voice to text software may have been used in the creation of this document   **

## 2022-04-05 LAB
ANION GAP SERPL CALCULATED.3IONS-SCNC: 4 MMOL/L (ref 4–13)
BUN SERPL-MCNC: 15 MG/DL (ref 5–25)
CALCIUM SERPL-MCNC: 8.8 MG/DL (ref 8.3–10.1)
CHLORIDE SERPL-SCNC: 95 MMOL/L (ref 100–108)
CO2 SERPL-SCNC: 44 MMOL/L (ref 21–32)
CREAT SERPL-MCNC: 0.85 MG/DL (ref 0.6–1.3)
GFR SERPL CREATININE-BSD FRML MDRD: 100 ML/MIN/1.73SQ M
GLUCOSE SERPL-MCNC: 130 MG/DL (ref 65–140)
MAGNESIUM SERPL-MCNC: 1.9 MG/DL (ref 1.6–2.6)
POTASSIUM SERPL-SCNC: 3.6 MMOL/L (ref 3.5–5.3)
SODIUM SERPL-SCNC: 143 MMOL/L (ref 136–145)

## 2022-04-05 PROCEDURE — 94640 AIRWAY INHALATION TREATMENT: CPT

## 2022-04-05 PROCEDURE — 99232 SBSQ HOSP IP/OBS MODERATE 35: CPT | Performed by: PHYSICIAN ASSISTANT

## 2022-04-05 PROCEDURE — 94660 CPAP INITIATION&MGMT: CPT

## 2022-04-05 PROCEDURE — 99232 SBSQ HOSP IP/OBS MODERATE 35: CPT | Performed by: NURSE PRACTITIONER

## 2022-04-05 PROCEDURE — 80048 BASIC METABOLIC PNL TOTAL CA: CPT | Performed by: PHYSICIAN ASSISTANT

## 2022-04-05 PROCEDURE — 83735 ASSAY OF MAGNESIUM: CPT | Performed by: PHYSICIAN ASSISTANT

## 2022-04-05 PROCEDURE — 94762 N-INVAS EAR/PLS OXIMTRY CONT: CPT

## 2022-04-05 PROCEDURE — 94003 VENT MGMT INPAT SUBQ DAY: CPT

## 2022-04-05 PROCEDURE — 94760 N-INVAS EAR/PLS OXIMETRY 1: CPT

## 2022-04-05 RX ADMIN — BUDESONIDE AND FORMOTEROL FUMARATE DIHYDRATE 2 PUFF: 160; 4.5 AEROSOL RESPIRATORY (INHALATION) at 08:11

## 2022-04-05 RX ADMIN — LEVALBUTEROL HYDROCHLORIDE 1.25 MG: 1.25 SOLUTION RESPIRATORY (INHALATION) at 07:15

## 2022-04-05 RX ADMIN — IPRATROPIUM BROMIDE 0.5 MG: 0.5 SOLUTION RESPIRATORY (INHALATION) at 20:45

## 2022-04-05 RX ADMIN — FUROSEMIDE 80 MG: 10 INJECTION, SOLUTION INTRAMUSCULAR; INTRAVENOUS at 16:18

## 2022-04-05 RX ADMIN — MORPHINE SULFATE 4 MG: 4 INJECTION INTRAVENOUS at 09:17

## 2022-04-05 RX ADMIN — METOPROLOL TARTRATE 100 MG: 100 TABLET, FILM COATED ORAL at 21:52

## 2022-04-05 RX ADMIN — MORPHINE SULFATE 4 MG: 4 INJECTION INTRAVENOUS at 17:40

## 2022-04-05 RX ADMIN — ACETAMINOPHEN 325MG 975 MG: 325 TABLET ORAL at 06:11

## 2022-04-05 RX ADMIN — POTASSIUM CHLORIDE 40 MEQ: 1500 TABLET, EXTENDED RELEASE ORAL at 08:10

## 2022-04-05 RX ADMIN — BUDESONIDE AND FORMOTEROL FUMARATE DIHYDRATE 2 PUFF: 160; 4.5 AEROSOL RESPIRATORY (INHALATION) at 17:40

## 2022-04-05 RX ADMIN — BUPROPION HYDROCHLORIDE 450 MG: 150 TABLET, FILM COATED, EXTENDED RELEASE ORAL at 08:10

## 2022-04-05 RX ADMIN — IPRATROPIUM BROMIDE 0.5 MG: 0.5 SOLUTION RESPIRATORY (INHALATION) at 13:38

## 2022-04-05 RX ADMIN — FAMOTIDINE 20 MG: 20 TABLET, FILM COATED ORAL at 08:10

## 2022-04-05 RX ADMIN — DICYCLOMINE HYDROCHLORIDE 20 MG: 20 TABLET ORAL at 21:52

## 2022-04-05 RX ADMIN — LEVOTHYROXINE SODIUM 25 MCG: 25 TABLET ORAL at 06:11

## 2022-04-05 RX ADMIN — LEVALBUTEROL HYDROCHLORIDE 1.25 MG: 1.25 SOLUTION RESPIRATORY (INHALATION) at 13:38

## 2022-04-05 RX ADMIN — ACETAMINOPHEN 325MG 975 MG: 325 TABLET ORAL at 13:27

## 2022-04-05 RX ADMIN — IPRATROPIUM BROMIDE 0.5 MG: 0.5 SOLUTION RESPIRATORY (INHALATION) at 07:15

## 2022-04-05 RX ADMIN — DICYCLOMINE HYDROCHLORIDE 20 MG: 20 TABLET ORAL at 16:18

## 2022-04-05 RX ADMIN — GABAPENTIN 300 MG: 300 CAPSULE ORAL at 08:10

## 2022-04-05 RX ADMIN — GABAPENTIN 300 MG: 300 CAPSULE ORAL at 17:38

## 2022-04-05 RX ADMIN — DOCUSATE SODIUM 100 MG: 100 CAPSULE, LIQUID FILLED ORAL at 17:38

## 2022-04-05 RX ADMIN — MORPHINE SULFATE 4 MG: 4 INJECTION INTRAVENOUS at 04:43

## 2022-04-05 RX ADMIN — PANTOPRAZOLE SODIUM 40 MG: 40 TABLET, DELAYED RELEASE ORAL at 06:11

## 2022-04-05 RX ADMIN — MORPHINE SULFATE 4 MG: 4 INJECTION INTRAVENOUS at 13:27

## 2022-04-05 RX ADMIN — DOCUSATE SODIUM 100 MG: 100 CAPSULE, LIQUID FILLED ORAL at 08:10

## 2022-04-05 RX ADMIN — ACETAMINOPHEN 325MG 975 MG: 325 TABLET ORAL at 21:52

## 2022-04-05 RX ADMIN — METOPROLOL TARTRATE 100 MG: 100 TABLET, FILM COATED ORAL at 08:10

## 2022-04-05 RX ADMIN — LEVALBUTEROL HYDROCHLORIDE 1.25 MG: 1.25 SOLUTION RESPIRATORY (INHALATION) at 20:46

## 2022-04-05 RX ADMIN — DICYCLOMINE HYDROCHLORIDE 20 MG: 20 TABLET ORAL at 11:26

## 2022-04-05 RX ADMIN — DABIGATRAN ETEXILATE MESYLATE 150 MG: 150 CAPSULE ORAL at 08:10

## 2022-04-05 RX ADMIN — DICYCLOMINE HYDROCHLORIDE 20 MG: 20 TABLET ORAL at 06:11

## 2022-04-05 RX ADMIN — MORPHINE SULFATE 4 MG: 4 INJECTION INTRAVENOUS at 21:53

## 2022-04-05 RX ADMIN — FUROSEMIDE 80 MG: 10 INJECTION, SOLUTION INTRAMUSCULAR; INTRAVENOUS at 08:09

## 2022-04-05 RX ADMIN — SPIRONOLACTONE 50 MG: 25 TABLET ORAL at 08:10

## 2022-04-05 RX ADMIN — DABIGATRAN ETEXILATE MESYLATE 150 MG: 150 CAPSULE ORAL at 17:38

## 2022-04-05 NOTE — CASE MANAGEMENT
Case Management Progress Note    Patient name Karla Madrid  Location S /S -01 MRN [id number]  : 1970 Date 2022       LOS (days): 1  Geometric Mean LOS (GMLOS) (days):   Days to GMLOS:        OBJECTIVE:        Current admission status: Inpatient  Preferred Pharmacy:   [id number] [address]  [place]  [address] [place] [address], [address] [place] [address]  Phone: [phone] Fax: [phone]    Primary Care Provider: Som Frazier MD    Primary Insurance: [id number]  Secondary Insurance:     PROGRESS NOTE:    [facility] [place] [facility]; currently getting IV diuretics, adjusting cardiac meds  Referral sent via [facility] to facility who confirms patient is a bedhold; anticipate return at discharge  Will need BLS transport (patient 525lbs and immobile)

## 2022-04-05 NOTE — PROGRESS NOTES
General Cardiology   Progress Note -  [facility]   Teresa Jha 47 y o  male MRN: [id number]  Unit/Bed#: S -01 Encounter: [id number]    Assessment     1  Acute on chronic hypoxic and hypercapnic respiratory failure  Typically wears 2 L via nasal cannula, currently requiring 3-4 L nasal cannula  Multifactorial in setting of acute diastolic CHF and morbid obesity with OHS  2  Acute on chronic diastolic CHF  Presented with weight gain, shortness of breath, LE edema  NT proBNP 1100 (up from 700)  Home diuretic: torsemide 40 mg BID, spironolactone 50 mg daily  Dry weight:  Recently 519 lb per [employer] He[employer] lift  Current weight: down 7 lb to 525 lb per bed scale  I/O: 3 1 L UOP, -2L/24 hours  Creatinine stable, K 3 6  Echo limited by body habitus but LV function estimated at 55%  3  Permanent atrial fibrillation, presenting with RVR  Home AVN blockers: metoprolol tartrate 50 mg BID  OAC: Xarelto  Rates 90s-100s per tele review  4  Hypertension- stable, average /71  5  History of DVT s/p IVC filter- on Xarelto  6  Morbid obesity with obesity hypoventilation syndrome- BMI 73 29  7  FILIPPO- compliant with CPAP at HS     Plan  1  Continue Lasix 80 mg IV BID and spironolactone for net negative fluid balance  2  Continue increased metoprolol dose- 100 mg BID  Consider adding diltiazem if rates do not continue to improve and patient still having palpitations  3  Continue anticoagulation with Xarelto  4  Strict I/Os, daily bed weights  5   2 gm Na diet with 1500 mL FR  6  AM BMP    Subjective  Feeling a little better today, urinating a lot  Still having occasional palpitations  Review of Systems   Constitutional: Negative for chills and diaphoresis  Cardiovascular: Positive for chest pain (pleuritic) and palpitations  Negative for dyspnea on exertion, leg swelling, orthopnea and syncope  Respiratory: Positive for shortness of breath  Negative for cough, sleep disturbances due to breathing and sputum production  Gastrointestinal: Negative for bloating, nausea and vomiting  Neurological: Negative for dizziness, light-headedness and weakness  All other systems reviewed and are negative  Objective:   Vitals: Blood pressure 131/57, pulse 101, temperature 98 °F (36 7 °C), temperature source Oral, resp  rate 20, height 5' 11" (1 803 m), weight (!) 238 kg (525 lb 8 oz), SpO2 97 %  , Body mass index is 73 29 kg/m²  ,     Systolic (52JHO), SQI:392 , Min:116 , BNU:360     Diastolic (10VPA), SJK:47, Min:55, Max:88    Intake/Output Summary (Last 24 hours) at 4/5/2022 0911  Last data filed at 4/5/2022 3072  Gross per 24 hour   Intake 720 ml   Output 3100 ml   Net -2380 ml     Wt Readings from Last 3 Encounters:   04/05/22 (!) 238 kg (525 lb 8 oz)   03/17/22 (!) 256 kg (564 lb 9 6 oz)   02/22/22 (!) 242 kg (534 lb)     Telemetry Review: Afib, 90s-100s    Physical Exam  Vitals reviewed  Constitutional:       General: He is not in acute distress  Appearance: He is obese  Neck:      Vascular: No hepatojugular reflux or JVD  Cardiovascular:      Rate and Rhythm: Normal rate  Rhythm irregularly irregular  Heart sounds: Heart sounds are distant  No murmur heard  No friction rub  No gallop  Pulmonary:      Effort: No respiratory distress  Breath sounds: No rales  Comments: Decreased at bases, on 3LNC  Abdominal:      General: Bowel sounds are normal  There is no distension  Palpations: Abdomen is soft  Tenderness: There is no abdominal tenderness  Musculoskeletal:         General: No tenderness  Normal range of motion  Cervical back: Neck supple  Right lower leg: Edema present  Left lower leg: Edema present  Skin:     General: Skin is warm and dry  Findings: No erythema  Neurological:      Mental Status: He is alert and oriented to person, place, and time           LABORATORY RESULTS      CBC with diff:   Results from last 7 days   Lab Units 04/03/22  1224   WBC Thousand/uL 4 96   HEMOGLOBIN g/dL 11 9*   HEMATOCRIT % 40 3   MCV fL 98   PLATELETS Thousands/uL 201   MCH pg 28 9   MCHC g/dL 29 5*   RDW % 14 6   MPV fL 10 1   NRBC AUTO /100 WBCs 0       CMP:  Results from last 7 days   Lab Units 22  0554 22  0429 22  1224   POTASSIUM mmol/L 3 6 3 8 3 2*   CHLORIDE mmol/L 95* 94* 94*   CO2 mmol/L 44* >45* >45*   BUN mg/dL 15 12 12   CREATININE mg/dL 0 85 0 89 0 90   CALCIUM mg/dL 8 8 8 8 8 3   AST U/L  --   --  17   ALT U/L  --   --  24   ALK PHOS U/L  --   --  69   EGFR ml/min/1 73sq m 100 98 98       BMP:  Results from last 7 days   Lab Units 22  0554 22  0429 22  1224   POTASSIUM mmol/L 3 6 3 8 3 2*   CHLORIDE mmol/L 95* 94* 94*   CO2 mmol/L 44* >45* >45*   BUN mg/dL 15 12 12   CREATININE mg/dL 0 85 0 89 0 90   CALCIUM mg/dL 8 8 8 8 8 3       Lab Results   Component Value Date    CREATININE 0 85 2022    CREATININE 0 89 2022    CREATININE 0 90 2022       Lab Results   Component Value Date    NTBNP 1,121 (H) 2022    NTBNP 752 (H) 2022    NTBNP 607 (H) 2022           Results from last 7 days   Lab Units 22  0554 22  04222  1224   MAGNESIUM mg/dL 1 9 1 9 1 7                 Results from last 7 days   Lab Units 22  1224   TSH 3RD GENERATON uIU/mL 1 982             Lipid Profile:   No results found for: CHOL  Lab Results   Component Value Date    HDL 44 2016     Lab Results   Component Value Date    LDLCALC 80 2016     Lab Results   Component Value Date    TRIG 197 (H) 2016       Cardiac testing:   Results for orders placed during the hospital encounter of 05/10/21    Echo complete with contrast if indicated    Narrative  [address]  [place], [address]    Transthoracic Echocardiogram  2D, M-mode, Doppler, and Color Doppler    Study date:  11-May-2021    Patient: Elizabeth Brain  MR number: [id number]  Account number: [de-identified]  : 1970  Age: 49 years  Gender: Male  Status: Outpatient  Location: [facility]  Height: 70 in  Weight: 409 2 lb  BP: 121/ 53 mmHg    Indications: Heart failure    Diagnoses: I50 9 - Heart failure, unspecified    Sonographer:  JOSE Alston  Referring Physician:  Ricci Solares MD  Group:  [facility]  Interpreting Physician:  Sara Garland MD    SUMMARY    PROCEDURE INFORMATION:  This was a technically difficult study  LEFT VENTRICLE:  Systolic function was normal  Ejection fraction was estimated to be 60 %  This study was inadequate for the evaluation of regional wall motion  HISTORY: PRIOR HISTORY: afib, CHF, morbid obesity, DVT, COPD, DM, hypothyroid, MRSA    PROCEDURE: The study was performed in the [facility]  This was a routine study  The transthoracic approach was used  The study included complete 2D imaging, M-mode, complete spectral Doppler, and color Doppler  The heart rate was 86  bpm, at the start of the study  Images were obtained from the parasternal, apical, subcostal, and suprasternal notch acoustic windows  Echocardiographic views were limited due to restricted patient mobility, poor acoustic window  availability, decreased penetration, and lung interference  This was a technically difficult study  LEFT VENTRICLE: Size was normal  Systolic function was normal  Ejection fraction was estimated to be 60 %  This study was inadequate for the evaluation of regional wall motion  Wall thickness could not be accurately determined  DOPPLER:  The study was not technically sufficient to allow evaluation of LV diastolic function  RIGHT VENTRICLE: Systolic function was normal  Not well visualized  LEFT ATRIUM: Not well visualized  RIGHT ATRIUM: Not well visualized  MITRAL VALVE: Not well visualized  DOPPLER: The transmitral velocity was within the normal range  There was trace regurgitation  AORTIC VALVE: The valve was probably trileaflet  The valve was not well visualized  DOPPLER: Transaortic velocity was within the normal range  There was no evidence for stenosis  TRICUSPID VALVE: Not well visualized  DOPPLER: The transtricuspid velocity was within the normal range  There was trace regurgitation  Pulmonary artery systolic pressure was within the normal range  Estimated peak PA pressure was 27 mmHg  PULMONIC VALVE: Not well visualized  DOPPLER: The transpulmonic velocity was within the normal range  There was trace regurgitation  PERICARDIUM: Not well visualized  AORTA: Not well visualized  SYSTEM MEASUREMENT TABLES    2D  Ao Diam: 3 11 cm  LA Diam: 4 34 cm    CW  AV Vmax: 1 22 m/s  AV maxP 91 mmHg  PV Vmax: 1 13 m/s  PV maxP 13 mmHg  TR Vmax: 2 72 m/s  TR maxP 6 mmHg    [facility]    Prepared and electronically signed by    Sylvester Buck MD  Signed 11-May-2021 11:37:20    No results found for this or any previous visit  No results found for this or any previous visit  No valid procedures specified  No results found for this or any previous visit        Meds/Allergies   all current active meds have been reviewed and current meds:   Current Facility-Administered Medications   Medication Dose Route Frequency    acetaminophen (TYLENOL) tablet 975 mg  975 mg Oral Q8H [address]    aluminum-magnesium hydroxide-simethicone (MYLANTA) oral suspension 30 mL  30 mL Oral Q6H PRN    budesonide-formoterol (SYMBICORT) 160-4 5 mcg/act inhaler 2 puff  2 puff Inhalation BID    buPROPion (WELLBUTRIN XL) 24 hr tablet 450 mg  450 mg Oral Daily    dabigatran etexilate (PRADAXA) capsule 150 mg  150 mg Oral BID    dicyclomine (BENTYL) tablet 20 mg  20 mg Oral 4x Daily (AC & HS)    diltiazem (CARDIZEM) 125 mg in sodium chloride 0 9 % 125 mL infusion  1-15 mg/hr Intravenous Titrated    docusate sodium (COLACE) capsule 100 mg  100 mg Oral BID    famotidine (PEPCID) tablet 20 mg  20 mg Oral Daily    furosemide (LASIX) injection 80 mg  80 mg Intravenous BID (diuretic)    gabapentin (NEURONTIN) capsule 300 mg  300 mg Oral BID    ipratropium (ATROVENT) 0 02 % inhalation solution 0 5 mg  0 5 mg Nebulization TID    levalbuterol (XOPENEX) inhalation solution 1 25 mg  1 25 mg Nebulization TID    levothyroxine tablet 25 mcg  25 mcg Oral Early Morning    metoprolol tartrate (LOPRESSOR) tablet 100 mg  100 mg Oral Q12H MARY    morphine (PF) 4 mg/mL injection 4 mg  4 mg Intravenous Q4H PRN    pantoprazole (PROTONIX) EC tablet 40 mg  40 mg Oral Daily    polyethylene glycol (MIRALAX) packet 17 g  17 g Oral Daily PRN    potassium chloride (K-DUR,KLOR-CON) CR tablet 40 mEq  40 mEq Oral Daily    spironolactone (ALDACTONE) tablet 50 mg  50 mg Oral Daily     Medications Prior to Admission   Medication    acetaminophen (TYLENOL) 500 mg tablet    albuterol (2 5 mg/3 mL) 0 083 % nebulizer solution    aluminum-magnesium hydroxide-simethicone (MYLANTA) 200-200-20 mg/5 mL suspension    ammonium lactate (LAC-HYDRIN) 12 % cream    budesonide-formoterol (SYMBICORT) 160-4 5 mcg/act inhaler    buPROPion (FORFIVO XL) 450 MG 24 hr tablet    Cholecalciferol (VITAMIN D) 50 MCG (2000 UT) tablet    cyanocobalamin (VITAMIN B-12) 1000 MCG tablet    dabigatran etexilate (PRADAXA) 150 mg capsu    dicyclomine (BENTYL) 20 mg tablet    digoxin (LANOXIN) 0 125 mg tablet    docusate sodium (COLACE) 100 mg capsule    famotidine (PEPCID) 20 mg tablet    gabapentin (NEURONTIN) 300 mg capsule    hydrOXYzine pamoate (VISTARIL) 25 mg capsule    ipratropium-albuterol (DUO-NEB) 0 5-2 5 mg/3 mL nebulizer solution    levothyroxine 25 mcg tablet    metoclopramide (REGLAN) 10 mg tablet    metoprolol tartrate (LOPRESSOR) 50 mg tablet    nystatin (MYCOSTATIN) cream    ondansetron (ZOFRAN) 4 mg tablet    pantoprazole (PROTONIX) 20 mg tablet    polyethylene glycol (MIRALAX) 17 g packet    Potassium Chloride (KCL-20 PO)    spironolactone (ALDACTONE) 50 mg tablet    torsemide 40 MG TABS    sodium chloride (OCEAN) 0 65 % nasal spray       diltiazem, 1-15 mg/hr, Last Rate: Stopped (04/04/22 4622)        Assessment:  Principal Problem:    Atrial fibrillation with RVR (HCC)  Active Problems: Morbid obesity ([facility] [address] )    Diabetes mellitus ([facility] )    Obesity hypoventilation syndrome/FILIPPO    Acute on chronic diastolic congestive heart failure (HCC)    Hypothyroidism    Chronic respiratory failure with hypoxia ([facility])      Counseling / Coordination of Care  Total floor / unit time spent today 20 minutes  Greater than 50% of total time was spent with the patient and / or family counseling and / or coordination of care  ** Please Note: Dragon 360 Dictation voice to text software may have been used in the creation of this document   **

## 2022-04-05 NOTE — PROGRESS NOTES
[facility]  Progress Note - Tyrone Law 1970, 47 y o  male MRN: [id number]  Unit/Bed#: S -01 Encounter: [id number]  Primary Care Provider: Noam Silva MD   Date and time admitted to hospital: 4/3/2022 11:42 AM    * Atrial fibrillation with RVR ([address] )  Assessment & Plan  · Patient presents with chest pain and shortness of breath related to AFib RVR  Presented with heart rate of 130  · Was briefly on IV Cardizem infusion upon admission, now discontinued  · Continue Lopressor however will increase to 100 mg b i d  · No longer on digoxin  · May need to add cardizem - defer to cardiology  · Continue Pradaxa for anticoagulation  · Cardiology following  · Monitor rate control on telemetry  · Heart rate this morning  beats per minute    Acute on chronic diastolic congestive heart failure [facility])  Assessment & Plan  Wt Readings from Last 3 Encounters:   04/05/22 (!) 238 kg (525 lb 8 oz)   03/17/22 (!) 256 kg (564 lb 9 6 oz)   02/22/22 (!) 242 kg (534 lb)     · Very difficult to evaluate volume status however has been compliant with torsemide  · Home regimen is torsemide 40 mg b i d  Unk Elina · Was started on IV Lasix 80 mg b i d   · Continue  · Monitor intake and output, daily weights  · Cardiology following  · Monitor BMP    Chronic respiratory failure with hypoxia (HCC)  Assessment & Plan  · Wears 4 liters/minute chronically  · At baseline, monitor respiratory status  Hypothyroidism  Assessment & Plan  · TSH within normal limits  · Continue Synthroid    Obesity hypoventilation syndrome/FILIPPO  Assessment & Plan  · Continue CPAP q h s  Diabetes mellitus [facility])  Assessment & Plan  Lab Results   Component Value Date    HGBA1C 5 4 03/21/2022       No results for input(s): POCGLU in the last 72 hours      Blood Sugar Average: Last 72 hrs:  · A1c 5 4   · Not maintained on anti-hyperglycemic medications  · ADA diet    Morbid obesity (HCC)  Assessment & Plan  · Body mass index is 73 29 kg/m²  · Outpatient bariatric surgery referral has been placed during a prior admission  · Dietary modifications        VTE Pharmacologic Prophylaxis: VTE Score: 7 High Risk (Score >/= 5) - Pharmacological DVT Prophylaxis Ordered: dabigatran (Pradaxa)  Sequential Compression Devices Ordered  Patient Centered Rounds: I performed bedside rounds with nursing staff today  Discussions with Specialists or Other Care Team Provider: CM, nursing    Education and Discussions with Family / Patient: Patient declined call to   Time Spent for Care: 30 minutes  More than 50% of total time spent on counseling and coordination of care as described above  Current Length of Stay: 1 day(s)  Current Patient Status: Inpatient   Certification Statement: The patient will continue to require additional inpatient hospital stay due to IV Lasix for CHF exacerbation, AFib RVR with telemetry monitoring  Discharge Plan: Anticipate discharge in 48-72 hrs to prior assisted or independent living facility  GoE    Code Status: Level 1 - Full Code    Subjective:   Patient complaining of twitching as well as some chest tightness  Shortness of breath is improving however still present  No overnight events on telemetry   beats per minute  Objective:     Vitals:   Temp (24hrs), Av 9 °F (36 6 °C), Min:97 6 °F (36 4 °C), Max:98 1 °F (36 7 °C)    Temp:  [97 6 °F (36 4 °C)-98 1 °F (36 7 °C)] 98 °F (36 7 °C)  HR:  [100-107] 101  Resp:  [20] 20  BP: (116-156)/(55-88) 131/57  SpO2:  [91 %-100 %] 97 %  Body mass index is 73 29 kg/m²  Input and Output Summary (last 24 hours): Intake/Output Summary (Last 24 hours) at 2022 0988  Last data filed at 2022 9233  Gross per 24 hour   Intake 1200 ml   Output 4000 ml   Net -2800 ml       Physical Exam:   Physical Exam  Vitals and nursing note reviewed  Constitutional:       General: He is not in acute distress  Appearance: Normal appearance  He is obese  Interventions: Nasal cannula in place  HENT:      Head: Normocephalic  Mouth/Throat:      Mouth: Mucous membranes are moist    Eyes:      Pupils: Pupils are equal, round, and reactive to light  Cardiovascular:      Rate and Rhythm: Tachycardia present  Rhythm irregular  Heart sounds: No murmur heard  Pulmonary:      Effort: Pulmonary effort is normal  No respiratory distress  Breath sounds: Normal breath sounds  No wheezing, rhonchi or rales  Abdominal:      General: Bowel sounds are normal  There is no distension  Palpations: Abdomen is soft  Tenderness: There is no abdominal tenderness  There is no guarding  Musculoskeletal:         General: No deformity  Cervical back: Normal range of motion  Right lower leg: Edema present  Left lower leg: Edema present  Skin:     Capillary Refill: Capillary refill takes less than 2 seconds  Neurological:      General: No focal deficit present  Mental Status: He is alert and oriented to person, place, and time  Mental status is at baseline  Additional Data:     Labs:  Results from last 7 days   Lab Units 04/03/22  1224   WBC Thousand/uL 4 96   HEMOGLOBIN g/dL 11 9*   HEMATOCRIT % 40 3   PLATELETS Thousands/uL 201   NEUTROS PCT % 72   LYMPHS PCT % 17   MONOS PCT % 8   EOS PCT % 2     Results from last 7 days   Lab Units 04/05/22  0554 04/04/22  0429 04/03/22  1224   SODIUM mmol/L 143   < > 144   POTASSIUM mmol/L 3 6   < > 3 2*   CHLORIDE mmol/L 95*   < > 94*   CO2 mmol/L 44*   < > >45*   BUN mg/dL 15   < > 12   CREATININE mg/dL 0 85   < > 0 90   ANION GAP mmol/L 4  --   --    CALCIUM mg/dL 8 8   < > 8 3   ALBUMIN g/dL  --   --  2 7*   TOTAL BILIRUBIN mg/dL  --   --  0 48   ALK PHOS U/L  --   --  69   ALT U/L  --   --  24   AST U/L  --   --  17   GLUCOSE RANDOM mg/dL 130   < > 129    < > = values in this interval not displayed                         Lines/Drains:  Invasive Devices  Report    Peripheral Intravenous Line            Peripheral IV 04/03/22 Left Antecubital 1 day                  Telemetry:  Telemetry Orders (From admission, onward)             48 Hour Telemetry Monitoring  Continuous x 48 hours        References:    Telemetry Guidelines   Question:  Reason for 48 Hour Telemetry  Answer:  Acute Decompensated CHF (continuous diuretic infusion or total diuretic dose > 200 mg daily, associated electrolyte derangement, ionotropic drip, history of ventricular arrhythmia, or new EF <35%)                 Telemetry Reviewed: Atrial fibrillation  HR averaging   Indication for Continued Telemetry Use: Acute CHF on >200 mg lasix/day or equivalent dose or with new reduced EF  Imaging: No pertinent imaging reviewed      Recent Cultures (last 7 days):         Last 24 Hours Medication List:   Current Facility-Administered Medications   Medication Dose Route Frequency Provider Last Rate    acetaminophen  975 mg Oral [facility] Jose Tabares PA-C      aluminum-magnesium hydroxide-simethicone  30 mL Oral Q6H PRN Jose Tabares PA-C      budesonide-formoterol  2 puff Inhalation BID Jose Tabares PA-C      buPROPion  450 mg Oral Daily Jose Tabares PA-C      dabigatran etexilate  150 mg Oral BID Jose Tabares PA-C      dicyclomine  20 mg Oral 4x Daily (AC & HS) Jose Tabares PA-C      docusate sodium  100 mg Oral BID Jose Tabares PA-C      famotidine  20 mg Oral Daily Jose Tabares PA-C      furosemide  80 mg Intravenous BID (diuretic) Gearluis felipe Monk, DO      gabapentin  300 mg Oral BID Jose Tabares PA-C      ipratropium  0 5 mg Nebulization TID [facility], DO      levalbuterol  1 25 mg Nebulization TID [facility], DO      levothyroxine  25 mcg Oral Early Morning Jose Tabares PA-C      metoprolol tartrate  100 mg Oral Q12H [address] ArinaRADHA Rosales      morphine injection  4 mg Intravenous Q4H PRN Jose Tabares PA-C      pantoprazole  40 mg Oral Daily Luwana Ebbs, PA-C      polyethylene glycol  17 g Oral Daily PRN Luwana Ebbs, PA-C      potassium chloride  40 mEq Oral Daily Luwana Ebbs, PA-C      spironolactone  50 mg Oral Daily Luwana Ebbs, JIMENA          Today, Patient Was Seen By: Veronica Cardoso PA-C    **Please Note: This note may have been constructed using a voice recognition system  **

## 2022-04-05 NOTE — ASSESSMENT & PLAN NOTE
Wt Readings from Last 3 Encounters:   04/05/22 (!) 238 kg (525 lb 8 oz)   03/17/22 (!) 256 kg (564 lb 9 6 oz)   02/22/22 (!) 242 kg (534 lb)     · Very difficult to evaluate volume status however has been compliant with torsemide  · Home regimen is torsemide 40 mg b i d  Margarite Yaquelin · Was started on IV Lasix 80 mg b i d   · Continue  · Monitor intake and output, daily weights  · Cardiology following    · Monitor BMP

## 2022-04-05 NOTE — ASSESSMENT & PLAN NOTE
· Patient presents with chest pain and shortness of breath related to AFib RVR  Presented with heart rate of 130  · Was briefly on IV Cardizem infusion upon admission, now discontinued  · Continue Lopressor however will increase to 100 mg b i d  · No longer on digoxin  · May need to add cardizem - defer to cardiology  · Continue Pradaxa for anticoagulation  · Cardiology following  · Monitor rate control on telemetry    · Heart rate this morning  beats per minute

## 2022-04-05 NOTE — PLAN OF CARE
Problem: Potential for Falls  Goal: Patient will remain free of falls  Description: INTERVENTIONS:  - Educate patient/family on patient safety including physical limitations  - Instruct patient to call for assistance with activity   - Consult OT/PT to assist with strengthening/mobility   - Keep Call bell within reach  - Keep bed low and locked with side rails adjusted as appropriate  - Keep care items and personal belongings within reach  - Initiate and maintain comfort rounds  - Make Fall Risk Sign visible to staff  - Offer Toileting every  Hours, in advance of need  - Initiate/Maintain bed alarm  - Apply yellow socks and bracelet for high fall risk patients  - Consider moving patient to room near nurses station  Outcome: Progressing     Problem: MOBILITY - ADULT  Goal: Maintain or return to baseline ADL function  Description: INTERVENTIONS:  -  Assess patient's ability to carry out ADLs; assess patient's baseline for ADL function and identify physical deficits which impact ability to perform ADLs (bathing, care of mouth/teeth, toileting, grooming, dressing, etc )  - Assess/evaluate cause of self-care deficits   - Assess range of motion  - Assess patient's mobility; develop plan if impaired  - Assess patient's need for assistive devices and provide as appropriate  - Encourage maximum independence but intervene and supervise when necessary  - Involve family in performance of ADLs  - Assess for home care needs following discharge   - Consider OT consult to assist with ADL evaluation and planning for discharge  - Provide patient education as appropriate  Outcome: Progressing  Goal: Maintains/Returns to pre admission functional level  Description: INTERVENTIONS:  - Perform BMAT or MOVE assessment daily    - Set and communicate daily mobility goal to care team and patient/family/caregiver  - Collaborate with rehabilitation services on mobility goals if consulted  - Perform Range of Motion 4 times a day    - Reposition patient every 4 hours  - Dangle patient 4 times a day  - Stand patient 44 times a day  - Ambulate patient 4 times a day  - Out of bed to chair 4 times a day   - Out of bed for meals 4 times a day  - Out of bed for toileting  - Record patient progress and toleration of activity level   Outcome: Progressing     Problem: Prexisting or High Potential for Compromised Skin Integrity  Goal: Skin integrity is maintained or improved  Description: INTERVENTIONS:  - Identify patients at risk for skin breakdown  - Assess and monitor skin integrity  - Assess and monitor nutrition and hydration status  - Monitor labs   - Assess for incontinence   - Turn and reposition patient  - Assist with mobility/ambulation  - Relieve pressure over bony prominences  - Avoid friction and shearing  - Provide appropriate hygiene as needed including keeping skin clean and dry  - Evaluate need for skin moisturizer/barrier cream  - Collaborate with interdisciplinary team   - Patient/family teaching  - Consider wound care consult   Outcome: Progressing     Problem: Nutrition/Hydration-ADULT  Goal: Nutrient/Hydration intake appropriate for improving, restoring or maintaining nutritional needs  Description: Monitor and assess patient's nutrition/hydration status for malnutrition  Collaborate with interdisciplinary team and initiate plan and interventions as ordered  Monitor patient's weight and dietary intake as ordered or per policy  Utilize nutrition screening tool and intervene as necessary  Determine patient's food preferences and provide high-protein, high-caloric foods as appropriate       INTERVENTIONS:  - Monitor oral intake, urinary output, labs, and treatment plans  - Assess nutrition and hydration status and recommend course of action  - Evaluate amount of meals eaten  - Assist patient with eating if necessary   - Allow adequate time for meals  - Recommend/ encourage appropriate diets, oral nutritional supplements, and vitamin/mineral supplements  - Order, calculate, and assess calorie counts as needed  - Recommend, monitor, and adjust tube feedings and TPN/PPN based on assessed needs  - Assess need for intravenous fluids  - Provide specific nutrition/hydration education as appropriate  - Include patient/family/caregiver in decisions related to nutrition  Outcome: Progressing

## 2022-04-05 NOTE — ASSESSMENT & PLAN NOTE
· Body mass index is 73 29 kg/m²  · Outpatient bariatric surgery referral has been placed during a prior admission    · Dietary modifications

## 2022-04-06 LAB
ARTERIAL PATENCY WRIST A: YES
BASE EXCESS BLDA CALC-SCNC: 23.6 MMOL/L
BUN SERPL-MCNC: 14 MG/DL (ref 5–25)
CALCIUM SERPL-MCNC: 8.8 MG/DL (ref 8.3–10.1)
CHLORIDE SERPL-SCNC: 92 MMOL/L (ref 100–108)
CO2 SERPL-SCNC: >45 MMOL/L (ref 21–32)
CREAT SERPL-MCNC: 0.87 MG/DL (ref 0.6–1.3)
GFR SERPL CREATININE-BSD FRML MDRD: 99 ML/MIN/1.73SQ M
GLUCOSE SERPL-MCNC: 123 MG/DL (ref 65–140)
HCO3 BLDA-SCNC: 53.8 MMOL/L (ref 22–28)
MAGNESIUM SERPL-MCNC: 2.1 MG/DL (ref 1.6–2.6)
NASAL CANNULA: 4
O2 CT BLDA-SCNC: 16.5 ML/DL (ref 16–23)
OXYHGB MFR BLDA: 95 % (ref 94–97)
PCO2 BLDA: 93.1 MM HG (ref 36–44)
PH BLDA: 7.38 [PH] (ref 7.35–7.45)
PO2 BLDA: 87.9 MM HG (ref 75–129)
POTASSIUM SERPL-SCNC: 3.7 MMOL/L (ref 3.5–5.3)
SODIUM SERPL-SCNC: 140 MMOL/L (ref 136–145)
SPECIMEN SOURCE: ABNORMAL

## 2022-04-06 PROCEDURE — 99232 SBSQ HOSP IP/OBS MODERATE 35: CPT | Performed by: NURSE PRACTITIONER

## 2022-04-06 PROCEDURE — 80048 BASIC METABOLIC PNL TOTAL CA: CPT | Performed by: PHYSICIAN ASSISTANT

## 2022-04-06 PROCEDURE — 36600 WITHDRAWAL OF ARTERIAL BLOOD: CPT

## 2022-04-06 PROCEDURE — 94760 N-INVAS EAR/PLS OXIMETRY 1: CPT

## 2022-04-06 PROCEDURE — 94640 AIRWAY INHALATION TREATMENT: CPT

## 2022-04-06 PROCEDURE — 99232 SBSQ HOSP IP/OBS MODERATE 35: CPT | Performed by: PHYSICIAN ASSISTANT

## 2022-04-06 PROCEDURE — 82805 BLOOD GASES W/O2 SATURATION: CPT | Performed by: INTERNAL MEDICINE

## 2022-04-06 PROCEDURE — 83735 ASSAY OF MAGNESIUM: CPT | Performed by: NURSE PRACTITIONER

## 2022-04-06 PROCEDURE — 94003 VENT MGMT INPAT SUBQ DAY: CPT

## 2022-04-06 RX ORDER — DIPHENHYDRAMINE HYDROCHLORIDE 50 MG/ML
25 INJECTION INTRAMUSCULAR; INTRAVENOUS EVERY 6 HOURS PRN
Status: DISCONTINUED | OUTPATIENT
Start: 2022-04-06 | End: 2022-04-10 | Stop reason: HOSPADM

## 2022-04-06 RX ADMIN — ACETAMINOPHEN 325MG 975 MG: 325 TABLET ORAL at 13:43

## 2022-04-06 RX ADMIN — SPIRONOLACTONE 50 MG: 25 TABLET ORAL at 09:39

## 2022-04-06 RX ADMIN — FUROSEMIDE 80 MG: 10 INJECTION, SOLUTION INTRAMUSCULAR; INTRAVENOUS at 09:39

## 2022-04-06 RX ADMIN — METOPROLOL TARTRATE 100 MG: 100 TABLET, FILM COATED ORAL at 21:38

## 2022-04-06 RX ADMIN — FUROSEMIDE 80 MG: 10 INJECTION, SOLUTION INTRAMUSCULAR; INTRAVENOUS at 16:07

## 2022-04-06 RX ADMIN — METOPROLOL TARTRATE 100 MG: 100 TABLET, FILM COATED ORAL at 09:40

## 2022-04-06 RX ADMIN — LEVALBUTEROL HYDROCHLORIDE 1.25 MG: 1.25 SOLUTION RESPIRATORY (INHALATION) at 14:32

## 2022-04-06 RX ADMIN — DICYCLOMINE HYDROCHLORIDE 20 MG: 20 TABLET ORAL at 06:09

## 2022-04-06 RX ADMIN — DOCUSATE SODIUM 100 MG: 100 CAPSULE, LIQUID FILLED ORAL at 17:06

## 2022-04-06 RX ADMIN — BUDESONIDE AND FORMOTEROL FUMARATE DIHYDRATE 2 PUFF: 160; 4.5 AEROSOL RESPIRATORY (INHALATION) at 09:39

## 2022-04-06 RX ADMIN — ACETAMINOPHEN 325MG 975 MG: 325 TABLET ORAL at 21:37

## 2022-04-06 RX ADMIN — LEVALBUTEROL HYDROCHLORIDE 1.25 MG: 1.25 SOLUTION RESPIRATORY (INHALATION) at 08:10

## 2022-04-06 RX ADMIN — DOCUSATE SODIUM 100 MG: 100 CAPSULE, LIQUID FILLED ORAL at 09:40

## 2022-04-06 RX ADMIN — DABIGATRAN ETEXILATE MESYLATE 150 MG: 150 CAPSULE ORAL at 09:40

## 2022-04-06 RX ADMIN — MORPHINE SULFATE 4 MG: 4 INJECTION INTRAVENOUS at 09:39

## 2022-04-06 RX ADMIN — MORPHINE SULFATE 4 MG: 4 INJECTION INTRAVENOUS at 21:38

## 2022-04-06 RX ADMIN — IPRATROPIUM BROMIDE 0.5 MG: 0.5 SOLUTION RESPIRATORY (INHALATION) at 08:10

## 2022-04-06 RX ADMIN — POTASSIUM CHLORIDE 40 MEQ: 1500 TABLET, EXTENDED RELEASE ORAL at 09:39

## 2022-04-06 RX ADMIN — LEVOTHYROXINE SODIUM 25 MCG: 25 TABLET ORAL at 06:08

## 2022-04-06 RX ADMIN — DICYCLOMINE HYDROCHLORIDE 20 MG: 20 TABLET ORAL at 21:38

## 2022-04-06 RX ADMIN — MORPHINE SULFATE 4 MG: 4 INJECTION INTRAVENOUS at 03:41

## 2022-04-06 RX ADMIN — DICYCLOMINE HYDROCHLORIDE 20 MG: 20 TABLET ORAL at 16:08

## 2022-04-06 RX ADMIN — BUDESONIDE AND FORMOTEROL FUMARATE DIHYDRATE 2 PUFF: 160; 4.5 AEROSOL RESPIRATORY (INHALATION) at 17:07

## 2022-04-06 RX ADMIN — DICYCLOMINE HYDROCHLORIDE 20 MG: 20 TABLET ORAL at 12:22

## 2022-04-06 RX ADMIN — LEVALBUTEROL HYDROCHLORIDE 1.25 MG: 1.25 SOLUTION RESPIRATORY (INHALATION) at 19:20

## 2022-04-06 RX ADMIN — BUPROPION HYDROCHLORIDE 450 MG: 150 TABLET, FILM COATED, EXTENDED RELEASE ORAL at 09:39

## 2022-04-06 RX ADMIN — FAMOTIDINE 20 MG: 20 TABLET, FILM COATED ORAL at 09:40

## 2022-04-06 RX ADMIN — IPRATROPIUM BROMIDE 0.5 MG: 0.5 SOLUTION RESPIRATORY (INHALATION) at 19:20

## 2022-04-06 RX ADMIN — DABIGATRAN ETEXILATE MESYLATE 150 MG: 150 CAPSULE ORAL at 17:06

## 2022-04-06 RX ADMIN — MORPHINE SULFATE 4 MG: 4 INJECTION INTRAVENOUS at 13:43

## 2022-04-06 RX ADMIN — PANTOPRAZOLE SODIUM 40 MG: 40 TABLET, DELAYED RELEASE ORAL at 06:08

## 2022-04-06 RX ADMIN — IPRATROPIUM BROMIDE 0.5 MG: 0.5 SOLUTION RESPIRATORY (INHALATION) at 14:32

## 2022-04-06 RX ADMIN — MORPHINE SULFATE 4 MG: 4 INJECTION INTRAVENOUS at 17:12

## 2022-04-06 RX ADMIN — ACETAMINOPHEN 325MG 975 MG: 325 TABLET ORAL at 06:08

## 2022-04-06 NOTE — ASSESSMENT & PLAN NOTE
Wt Readings from Last 3 Encounters:   04/06/22 (!) 237 kg (522 lb 11 4 oz)   03/17/22 (!) 256 kg (564 lb 9 6 oz)   02/22/22 (!) 242 kg (534 lb)     · Very difficult to evaluate volume status however has been compliant with torsemide  · Home regimen is torsemide 40 mg b i d  Ssui Fernandez · Was started on IV Lasix 80 mg b i d   · Continue  · Monitor intake and output, daily weights  · Cardiology following    · Monitor BMP

## 2022-04-06 NOTE — APP STUDENT NOTE
Assessment & Plan  Atrial Fibrillation with RVR ([facility] [address] )  -patient has some chest pain today although greatly improved from days prior  -presented 3 days ago with a HR of 130, but today his HR ranges from   -if HR consistently hits 115 start IV Cardizem until HR ranges below 100   -maintain HR and angina with Lopressor 100mg BID  -continue Pradaxa for anticoagulation  -monitor with tele    Acute on Chronic Diastolic Congestive Heart failure  -Home regimen is torsemide 40mg BID  -During hospital stay, on IV lasik 80mg BID, continue  -cardiology evaluation  -Continue cardiac diet and 1500ml fluid restriction  -Monitor I/Os and DW    Chronic Respiratory failure with hypoxia  -  Baseline is 2L/min chronic of oxygen  -currently on 4L/min and still feels SOB  -SpO2 is 94%  -wean down to 2L/min as tolerated    Hypothyroidism  -TSH WNL  -continue synthroid    Diabetes Mellitus  -Maintained by diet, A1C is 5 4    Morbid Obesity  -BMI is 73 29kg/m2  -Dietician Consult  -Bariatric sx referral      VTE Pharmacologic Prophylaxis: VTE Score: 7 Moderate Risk (Score 3-4) - Pharmacological DVT Prophylaxis Ordered: dabigatran (Pradaxa)  Patient Centered Rounds: I performed bedside rounds with nursing staff today  Discussions with Specialists or Other Care Team Provider:     Education and Discussions with Family / Patient: Patient declined call to   Time Spent for Care: 20 minutes  More than 50% of total time spent on counseling and coordination of care as described above  Current Length of Stay: 2 day(s)  Current Patient Status: Inpatient   Certification Statement: The patient will continue to require additional inpatient hospital stay due to observation of CO2 and arrythmia   Discharge Plan: Anticipate discharge in 24-48 hrs to rehab facility  Code Status: Level 1 - Full Code    Subjective:   Patient is a 51y  o  male that presents today with mild chest pain   The chest pain has been improving and does not bother him very frequently  The chest pain does not radiate  He still feels short of breath and the shortness of breath has not improved of the last 24hrs  He states he feels less anxious than yesterday, but his twitching has gotten more severe  He denies nausea, weakness, headache, changes in stool pattern, or profuse sweating outside of his normal     Objective:     Vitals:   Temp (24hrs), Av 1 °F (36 7 °C), Min:97 5 °F (36 4 °C), Max:98 6 °F (37 °C)    Temp:  [97 5 °F (36 4 °C)-98 6 °F (37 °C)] 97 5 °F (36 4 °C)  HR:  [] 90  Resp:  [20-22] 22  BP: ()/(58-79) 133/79  SpO2:  [90 %-97 %] 94 %  Body mass index is 72 9 kg/m²  Input and Output Summary (last 24 hours): Intake/Output Summary (Last 24 hours) at 2022 0849  Last data filed at 2022 0757  Gross per 24 hour   Intake 960 ml   Output 2700 ml   Net -1740 ml       Physical Exam:   Physical Exam  Constitutional:       General: He is not in acute distress  Appearance: He is obese  He is not toxic-appearing or diaphoretic  HENT:      Head: Normocephalic and atraumatic  Mouth/Throat:      Mouth: Mucous membranes are dry  Eyes:      Extraocular Movements: Extraocular movements intact  Conjunctiva/sclera: Conjunctivae normal       Pupils: Pupils are equal, round, and reactive to light  Cardiovascular:      Rate and Rhythm: Normal rate  Rhythm irregular  Pulses: Normal pulses  Heart sounds: Normal heart sounds  No murmur heard  No friction rub  No gallop  Pulmonary:      Effort: Pulmonary effort is normal  No respiratory distress  Breath sounds: Normal breath sounds  No stridor  No wheezing, rhonchi or rales  Chest:      Chest wall: No tenderness  Abdominal:      Palpations: Abdomen is soft  Tenderness: There is no abdominal tenderness  Musculoskeletal:      Right lower leg: Edema present  Left lower leg: Edema present  Skin:     General: Skin is warm and dry        Coloration: Skin is not jaundiced or pale  Findings: No lesion or rash  Neurological:      General: No focal deficit present  Mental Status: He is alert and oriented to person, place, and time  Mental status is at baseline  Additional Data:     Labs:  Results from last 7 days   Lab Units 04/03/22  1224   WBC Thousand/uL 4 96   HEMOGLOBIN g/dL 11 9*   HEMATOCRIT % 40 3   PLATELETS Thousands/uL 201   NEUTROS PCT % 72   LYMPHS PCT % 17   MONOS PCT % 8   EOS PCT % 2     Results from last 7 days   Lab Units 04/06/22  0556 04/05/22  0554 04/05/22  0554 04/04/22  0429 04/03/22  1224   SODIUM mmol/L 140   < > 143   < > 144   POTASSIUM mmol/L 3 7   < > 3 6   < > 3 2*   CHLORIDE mmol/L 92*   < > 95*   < > 94*   CO2 mmol/L >45*   < > 44*   < > >45*   BUN mg/dL 14   < > 15   < > 12   CREATININE mg/dL 0 87   < > 0 85   < > 0 90   ANION GAP mmol/L  --   --  4  --   --    CALCIUM mg/dL 8 8   < > 8 8   < > 8 3   ALBUMIN g/dL  --   --   --   --  2 7*   TOTAL BILIRUBIN mg/dL  --   --   --   --  0 48   ALK PHOS U/L  --   --   --   --  69   ALT U/L  --   --   --   --  24   AST U/L  --   --   --   --  17   GLUCOSE RANDOM mg/dL 123   < > 130   < > 129    < > = values in this interval not displayed  Lines/Drains:  Invasive Devices  Report    Peripheral Intravenous Line            Peripheral IV 04/03/22 Left Antecubital 2 days                  Telemetry:  Telemetry Orders (From admission, onward)             48 Hour Telemetry Monitoring  Continuous x 48 hours        References:    Telemetry Guidelines   Question:  Reason for 48 Hour Telemetry  Answer:  Acute Decompensated CHF (continuous diuretic infusion or total diuretic dose > 200 mg daily, associated electrolyte derangement, ionotropic drip, history of ventricular arrhythmia, or new EF <35%)                 Telemetry Reviewed: Atrial fibrillation   HR averaging 103  Indication for Continued Telemetry Use: Arrthymias requiring medical therapy Imaging: Reviewed radiology reports from this admission including: ECHO    Recent Cultures (last 7 days):         Last 24 Hours Medication List:   Current Facility-Administered Medications   Medication Dose Route Frequency Provider Last Rate    acetaminophen  975 mg Oral [facility] Anival Eisenberg PA-C      aluminum-magnesium hydroxide-simethicone  30 mL Oral Q6H PRN Anivallilian Eisenberg, PA-C      budesonide-formoterol  2 puff Inhalation BID Anival Elgin, PA-C      buPROPion  450 mg Oral Daily Anival Quarelizabeth, PA-C      dabigatran etexilate  150 mg Oral BID Anival Quarelizabeth, PA-C      dicyclomine  20 mg Oral 4x Daily (AC & HS) ALESSANDRO Fang-C      docusate sodium  100 mg Oral BID Anival Quarelizabeth, PA-C      famotidine  20 mg Oral Daily Anival Quarelizabeth, PA-C      furosemide  80 mg Intravenous BID (diuretic) Cristal Peer, DO      ipratropium  0 5 mg Nebulization TID Cristal Peer, DO      levalbuterol  1 25 mg Nebulization TID Cristal Peer, DO      levothyroxine  25 mcg Oral Early Morning Anival Eisenberg, PA-C      metoprolol tartrate  100 mg Oral Q12H [address] Rosaura Rule, CRNP      morphine injection  4 mg Intravenous Q4H PRN Anival Eisenberg PA-C      pantoprazole  40 mg Oral Daily Anival Eisenberg, PA-C      polyethylene glycol  17 g Oral Daily PRN Anivallilian Eisenberg, PA-C      potassium chloride  40 mEq Oral Daily Anival Quarelizabeth, PA-C      spironolactone  50 mg Oral Daily Anival Eisenberg PA-C          Today, Patient Was Seen By: Ford Parry    **Please Note: This note may have been constructed using a voice recognition system  **

## 2022-04-06 NOTE — ASSESSMENT & PLAN NOTE
Wt Readings from Last 3 Encounters:   04/06/22 (!) 237 kg (522 lb 11 4 oz)   03/17/22 (!) 256 kg (564 lb 9 6 oz)   02/22/22 (!) 242 kg (534 lb)     · Very difficult to evaluate volume status however has been compliant with torsemide  · Home regimen is torsemide 40 mg b i d  Sharda Echavarria · Was started on IV Lasix 80 mg b i d  And spironolactone  · Continue  · Monitor intake and output, daily weights  · Stagnant weight loss today, remains at 522 lbs  With dry weight of 519  · Cardiology following    · Monitor BMP

## 2022-04-06 NOTE — PROGRESS NOTES
General Cardiology   Progress Note -  [employer] [facility]   Alirio Rogers 47 y o  male MRN: [id number]  Unit/Bed#: S -01 Encounter: [id number]    Assessment  1  Acute on chronic hypoxic and hypercapnic respiratory failure  Multifactorial in setting of acute diastolic CHF and morbid obesity with OHS  Currently 95-96% on 4LNC, typically on 2LNC  2  Acute on chronic diastolic CHF  Presented with weight gain, shortness of breath, LE edema  NT proBNP 1100 (up from 700)  Home diuretic: torsemide 40 mg BID, spironolactone 50 mg daily  Dry weight:  Recently 519 lb per [place] lift  Current weight: trending down, 522 lb today  I/O: 2 3 L UOP, -1 3L/24 hours  Net negative 3 8 L  Creatinine stable, K 3 7  Echo limited by body habitus but LV function estimated at 55%  3  Permanent atrial fibrillation, presenting with RVR  Home AVN blockers: metoprolol tartrate 50 mg BID  OAC: Xarelto  Rates 90s-100s per tele review  4  Hypertension- controlled, average /65  5   History of DVT s/p IVC filter- on Xarelto  6  Morbid obesity with obesity hypoventilation syndrome- BMI 72 9  7  FILIPPO- compliant with CPAP at HS     Plan  1  Continue Lasix 80 mg IV BID and spironolactone for net negative fluid balance  2  Continue increased metoprolol dose- 100 mg BID  Having less palpitations today  Rates acceptable in the 90s-100s  3  Continue anticoagulation with Xarelto  4  Strict I/Os, daily bed weights  5   2 gm Na diet with 1500 mL FR  6  AM BMP    Subjective  Continues to feel better from cardiac standpoint  Less palpitations  Continues to respond well to Lasix  Review of Systems   Constitutional: Negative for chills, diaphoresis and malaise/fatigue  Cardiovascular: Negative for chest pain, dyspnea on exertion, leg swelling, orthopnea, palpitations and syncope  Respiratory: Positive for shortness of breath  Negative for cough, sleep disturbances due to breathing and sputum production      Gastrointestinal: Negative for bloating, nausea and vomiting  Neurological: Positive for tremors  Negative for dizziness, light-headedness and weakness  All other systems reviewed and are negative  Objective:   Vitals: Blood pressure 133/79, pulse 90, temperature 97 5 °F (36 4 °C), temperature source Oral, resp  rate 22, height 5' 11" (1 803 m), weight (!) 237 kg (522 lb 11 4 oz), SpO2 94 %  , Body mass index is 72 9 kg/m²  ,     Systolic (97KJJ), YQR:881 , Min:98 , MB     Diastolic (26OGV), TJE:50, Min:58, Max:79    Intake/Output Summary (Last 24 hours) at 2022 1034  Last data filed at 2022 0757  Gross per 24 hour   Intake 480 ml   Output 1800 ml   Net -1320 ml     Wt Readings from Last 3 Encounters:   22 (!) 237 kg (522 lb 11 4 oz)   22 (!) 256 kg (564 lb 9 6 oz)   22 (!) 242 kg (534 lb)     Telemetry Review: Afib, rates 90s-100s    Physical Exam  Vitals reviewed  Constitutional:       General: He is not in acute distress  Appearance: He is obese  Neck:      Vascular: No hepatojugular reflux or JVD  Cardiovascular:      Rate and Rhythm: Normal rate  Rhythm irregularly irregular  Heart sounds: Heart sounds are distant  No murmur heard  No friction rub  No gallop  Pulmonary:      Effort: No respiratory distress  Breath sounds: No rales  Comments: Diminished throughout, 4LNC  Abdominal:      General: Bowel sounds are normal  There is no distension  Palpations: Abdomen is soft  Tenderness: There is no abdominal tenderness  Musculoskeletal:         General: No tenderness  Normal range of motion  Cervical back: Neck supple  Right lower leg: Edema present  Left lower leg: Edema present  Skin:     General: Skin is warm and dry  Findings: No erythema  Neurological:      Mental Status: He is alert and oriented to person, place, and time     Psychiatric:         Mood and Affect: Mood normal      LABORATORY RESULTS      CBC with diff:   Results from last 7 days   Lab Units 04/03/22  1224   WBC Thousand/uL 4 96   HEMOGLOBIN g/dL 11 9*   HEMATOCRIT % 40 3   MCV fL 98   PLATELETS Thousands/uL 201   MCH pg 28 9   MCHC g/dL 29 5*   RDW % 14 6   MPV fL 10 1   NRBC AUTO /100 WBCs 0     CMP:  Results from last 7 days   Lab Units 04/06/22  0556 04/05/22  0554 04/04/22  0429 04/03/22  1224   POTASSIUM mmol/L 3 7 3 6 3 8 3 2*   CHLORIDE mmol/L 92* 95* 94* 94*   CO2 mmol/L >45* 44* >45* >45*   BUN mg/dL 14 15 12 12   CREATININE mg/dL 0 87 0 85 0 89 0 90   CALCIUM mg/dL 8 8 8 8 8 8 8 3   AST U/L  --   --   --  17   ALT U/L  --   --   --  24   ALK PHOS U/L  --   --   --  69   EGFR ml/min/1 73sq m 99 100 98 98     BMP:  Results from last 7 days   Lab Units 04/06/22  0556 04/05/22  0554 04/04/22 0429 04/03/22  1224   POTASSIUM mmol/L 3 7 3 6 3 8 3 2*   CHLORIDE mmol/L 92* 95* 94* 94*   CO2 mmol/L >45* 44* >45* >45*   BUN mg/dL 14 15 12 12   CREATININE mg/dL 0 87 0 85 0 89 0 90   CALCIUM mg/dL 8 8 8 8 8 8 8 3       Lab Results   Component Value Date    CREATININE 0 87 04/06/2022    CREATININE 0 85 04/05/2022    CREATININE 0 89 04/04/2022       Lab Results   Component Value Date    NTBNP 1,121 (H) 04/03/2022    NTBNP 752 (H) 03/13/2022    NTBNP 607 (H) 02/18/2022           Results from last 7 days   Lab Units 04/05/22  0554 04/04/22 0429 04/03/22  1224   MAGNESIUM mg/dL 1 9 1 9 1 7                 Results from last 7 days   Lab Units 04/03/22  1224   TSH 3RD GENERATON uIU/mL 1 982             Lipid Profile:   No results found for: CHOL  Lab Results   Component Value Date    HDL 44 08/16/2016     Lab Results   Component Value Date    LDLCALC 80 08/16/2016     Lab Results   Component Value Date    TRIG 197 (H) 08/16/2016       Cardiac testing:   Results for orders placed during the hospital encounter of 05/10/21    Echo complete with contrast if indicated    [address] [address]    Transthoracic Echocardiogram  2D, M-mode, Doppler, and Color Doppler    Study date:  11-May-2021    Patient: Pina Wren  MR number: [id number]  Account number: [de-identified]  : 1970  Age: 49 years  Gender: Male  Status: Outpatient  Location: [facility]  Height: 70 in  Weight: 409 2 lb  BP: 121/ 53 mmHg    Indications: Heart failure    Diagnoses: I50 9 - Heart failure, unspecified    Sonographer:  JOSE Prater  Referring Physician:  Alma Birmingham MD  Group:  [facility]  Interpreting Physician:  Genia Sacks, MD    SUMMARY    PROCEDURE INFORMATION:  This was a technically difficult study  LEFT VENTRICLE:  Systolic function was normal  Ejection fraction was estimated to be 60 %  This study was inadequate for the evaluation of regional wall motion  HISTORY: PRIOR HISTORY: afib, CHF, morbid obesity, DVT, COPD, DM, hypothyroid, MRSA    PROCEDURE: The study was performed in the [facility]  This was a routine study  The transthoracic approach was used  The study included complete 2D imaging, M-mode, complete spectral Doppler, and color Doppler  The heart rate was 86  bpm, at the start of the study  Images were obtained from the parasternal, apical, subcostal, and suprasternal notch acoustic windows  Echocardiographic views were limited due to restricted patient mobility, poor acoustic window  availability, decreased penetration, and lung interference  This was a technically difficult study  LEFT VENTRICLE: Size was normal  Systolic function was normal  Ejection fraction was estimated to be 60 %  This study was inadequate for the evaluation of regional wall motion  Wall thickness could not be accurately determined  DOPPLER:  The study was not technically sufficient to allow evaluation of LV diastolic function  RIGHT VENTRICLE: Systolic function was normal  Not well visualized  LEFT ATRIUM: Not well visualized  RIGHT ATRIUM: Not well visualized  MITRAL VALVE: Not well visualized   DOPPLER: The transmitral velocity was within the normal range  There was trace regurgitation  AORTIC VALVE: The valve was probably trileaflet  The valve was not well visualized  DOPPLER: Transaortic velocity was within the normal range  There was no evidence for stenosis  TRICUSPID VALVE: Not well visualized  DOPPLER: The transtricuspid velocity was within the normal range  There was trace regurgitation  Pulmonary artery systolic pressure was within the normal range  Estimated peak PA pressure was 27 mmHg  PULMONIC VALVE: Not well visualized  DOPPLER: The transpulmonic velocity was within the normal range  There was trace regurgitation  PERICARDIUM: Not well visualized  AORTA: Not well visualized      SYSTEM MEASUREMENT TABLES    2D  Ao Diam: 3 11 cm  LA Diam: 4 34 cm    CW  AV Vmax: 1 22 m/s  AV maxP 91 mmHg  PV Vmax: 1 13 m/s  PV maxP 13 mmHg  TR Vmax: 2 72 m/s  TR maxP 6 mmHg    [facility] Accredited Echocardiography Laboratory    Prepared and electronically signed by    Sylvester Buck MD  Signed 11-May-2021 11:37:20    Meds/Allergies   all current active meds have been reviewed and current meds:   Current Facility-Administered Medications   Medication Dose Route Frequency    acetaminophen (TYLENOL) tablet 975 mg  975 mg Oral Q8H [address]    aluminum-magnesium hydroxide-simethicone (MYLANTA) oral suspension 30 mL  30 mL Oral Q6H PRN    budesonide-formoterol (SYMBICORT) 160-4 5 mcg/act inhaler 2 puff  2 puff Inhalation BID    buPROPion (WELLBUTRIN XL) 24 hr tablet 450 mg  450 mg Oral Daily    dabigatran etexilate (PRADAXA) capsule 150 mg  150 mg Oral BID    dicyclomine (BENTYL) tablet 20 mg  20 mg Oral 4x Daily (AC & HS)    docusate sodium (COLACE) capsule 100 mg  100 mg Oral BID    famotidine (PEPCID) tablet 20 mg  20 mg Oral Daily    furosemide (LASIX) injection 80 mg  80 mg Intravenous BID (diuretic)    ipratropium (ATROVENT) 0 02 % inhalation solution 0 5 mg  0 5 mg Nebulization TID    levalbuterol (XOPENEX) inhalation solution 1 25 mg  1 25 mg Nebulization TID    levothyroxine tablet 25 mcg  25 mcg Oral Early Morning    metoprolol tartrate (LOPRESSOR) tablet 100 mg  100 mg Oral Q12H MARY    morphine (PF) 4 mg/mL injection 4 mg  4 mg Intravenous Q4H PRN    pantoprazole (PROTONIX) EC tablet 40 mg  40 mg Oral Daily    polyethylene glycol (MIRALAX) packet 17 g  17 g Oral Daily PRN    potassium chloride (K-DUR,KLOR-CON) CR tablet 40 mEq  40 mEq Oral Daily    spironolactone (ALDACTONE) tablet 50 mg  50 mg Oral Daily     Medications Prior to Admission   Medication    acetaminophen (TYLENOL) 500 mg tablet    albuterol (2 5 mg/3 mL) 0 083 % nebulizer solution    aluminum-magnesium hydroxide-simethicone (MYLANTA) 200-200-20 mg/5 mL suspension    ammonium lactate (LAC-HYDRIN) 12 % cream    budesonide-formoterol (SYMBICORT) 160-4 5 mcg/act inhaler    buPROPion (FORFIVO XL) 450 MG 24 hr tablet    Cholecalciferol (VITAMIN D) 50 MCG (2000 UT) tablet    cyanocobalamin (VITAMIN B-12) 1000 MCG tablet    dabigatran etexilate (PRADAXA) 150 mg capsu    dicyclomine (BENTYL) 20 mg tablet    digoxin (LANOXIN) 0 125 mg tablet    docusate sodium (COLACE) 100 mg capsule    famotidine (PEPCID) 20 mg tablet    gabapentin (NEURONTIN) 300 mg capsule    hydrOXYzine pamoate (VISTARIL) 25 mg capsule    ipratropium-albuterol (DUO-NEB) 0 5-2 5 mg/3 mL nebulizer solution    levothyroxine 25 mcg tablet    metoclopramide (REGLAN) 10 mg tablet    metoprolol tartrate (LOPRESSOR) 50 mg tablet    nystatin (MYCOSTATIN) cream    ondansetron (ZOFRAN) 4 mg tablet    pantoprazole (PROTONIX) 20 mg tablet    polyethylene glycol (MIRALAX) 17 g packet    Potassium Chloride (KCL-20 PO)    spironolactone (ALDACTONE) 50 mg tablet    torsemide 40 MG TABS    sodium chloride (OCEAN) 0 65 % nasal spray     Counseling / Coordination of Care  Total floor / unit time spent today 20 minutes    Greater than 50% of total time was spent with the patient and / or family counseling and / or coordination of care  ** Please Note: Dragon 360 Dictation voice to text software may have been used in the creation of this document   **

## 2022-04-06 NOTE — ASSESSMENT & PLAN NOTE
· Body mass index is 72 9 kg/m²  · Outpatient bariatric surgery referral has been placed during a prior admission    · Dietary modifications

## 2022-04-06 NOTE — ASSESSMENT & PLAN NOTE
· Patient presents with chest pain and shortness of breath related to AFib RVR  Presented with heart rate of 130  · Was briefly on IV Cardizem infusion upon admission, now discontinued  · Continue Lopressor 100 mg b i d ,  Rates improved and BP tolerating  · No longer on digoxin  · Continue Pradaxa for anticoagulation  · Cardiology following  · Monitor rate control on telemetry    · Heart rate averaging low 100s

## 2022-04-06 NOTE — ASSESSMENT & PLAN NOTE
· Patient presents with chest pain and shortness of breath related to AFib RVR  Presented with heart rate of 130  · Was briefly on IV Cardizem infusion upon admission, now discontinued  · Continue Lopressor 100 mg b i d  · No longer on digoxin  · May need to add cardizem - defer to cardiology  · Continue Pradaxa for anticoagulation  · Cardiology following  · Monitor rate control on telemetry    · Heart rate this morning 90 beats per minute

## 2022-04-06 NOTE — ASSESSMENT & PLAN NOTE
· Noted history of tremors, worsening while hospitalized here  · Stopped gabapentin  · This has been ongoing however he is very bothered by this  · Trial Benadryl, can also consider Cogentin    · Monitor for improvement as hypercapnea improves

## 2022-04-06 NOTE — ASSESSMENT & PLAN NOTE
· Noted history of tremors, worsening while hospitalized here  · Will discontinue gabapentin  · This has been ongoing however he does but of the patient    May benefit from outpatient neuro referral

## 2022-04-06 NOTE — PROGRESS NOTES
[facility]  Progress Note - Maribell Acuña 1970, 47 y o  male MRN: [id number]  Unit/Bed#: S -01 Encounter: [id number]  Primary Care Provider: Janet Nielson MD   Date and time admitted to hospital: 4/3/2022 11:42 AM    * Atrial fibrillation with RVR ([facility] [address] )  Assessment & Plan  · Patient presents with chest pain and shortness of breath related to AFib RVR  Presented with heart rate of 130  · Was briefly on IV Cardizem infusion upon admission, now discontinued  · Continue Lopressor 100 mg b i d  · No longer on digoxin  · May need to add cardizem - defer to cardiology  · Continue Pradaxa for anticoagulation  · Cardiology following  · Monitor rate control on telemetry  · Heart rate this morning 90 beats per minute    Acute on chronic diastolic congestive heart failure [facility])  Assessment & Plan  Wt Readings from Last 3 Encounters:   04/06/22 (!) 237 kg (522 lb 11 4 oz)   03/17/22 (!) 256 kg (564 lb 9 6 oz)   02/22/22 (!) 242 kg (534 lb)     · Very difficult to evaluate volume status however has been compliant with torsemide  · Home regimen is torsemide 40 mg b i d  Pipe Cruz · Was started on IV Lasix 80 mg b i d   · Continue  · Monitor intake and output, daily weights  · Cardiology following  · Monitor BMP    Chronic respiratory failure with hypoxia (HCC)  Assessment & Plan  · Wears 4 liters/minute chronically  · At baseline, monitor respiratory status  Hypothyroidism  Assessment & Plan  · TSH within normal limits  · Continue Synthroid    Obesity hypoventilation syndrome/FILIPPO  Assessment & Plan  · Continue CPAP q h s  Diabetes mellitus [facility])  Assessment & Plan  Lab Results   Component Value Date    HGBA1C 5 4 03/21/2022       No results for input(s): POCGLU in the last 72 hours      Blood Sugar Average: Last 72 hrs:  · A1c 5 4   · Not maintained on anti-hyperglycemic medications  · ADA diet    Tremor  Assessment & Plan  · Noted history of tremors, worsening while hospitalized here   · Stopped gabapentin  · This has been ongoing however he is very bothered by this  · Trial Benadryl, can also consider Cogentin  Morbid obesity ([facility] [address] )  Assessment & Plan  · Body mass index is 72 9 kg/m²  · Outpatient bariatric surgery referral has been placed during a prior admission  · Dietary modifications      VTE Pharmacologic Prophylaxis: VTE Score: 7 High Risk (Score >/= 5) - Pharmacological DVT Prophylaxis Ordered: dabigatran (Pradaxa)  Sequential Compression Devices Ordered  Patient Centered Rounds: I performed bedside rounds with nursing staff today  Discussions with Specialists or Other Care Team Provider: CM, nursing    Education and Discussions with Family / Patient: Patient declined call to   Time Spent for Care: 30 minutes  More than 50% of total time spent on counseling and coordination of care as described above  Current Length of Stay: 2 day(s)  Current Patient Status: Inpatient   Certification Statement: The patient will continue to require additional inpatient hospital stay due to IV diuresis, AFib RVR requiring telemetry monitoring and rate control  Discharge Plan: Anticipate discharge in 48-72 hrs to prior assisted or independent living facility  GoE    Code Status: Level 1 - Full Code    Subjective:   Patient still complaining of tremors bilaterally, which are intermittent  Has had these before but never this bad  Chest pain and shortness of breath are improving  Objective:     Vitals:   Temp (24hrs), Av 1 °F (36 7 °C), Min:97 5 °F (36 4 °C), Max:98 6 °F (37 °C)    Temp:  [97 5 °F (36 4 °C)-98 6 °F (37 °C)] 97 5 °F (36 4 °C)  HR:  [] 90  Resp:  [20-22] 22  BP: ()/(58-79) 133/79  SpO2:  [92 %-97 %] 94 %  Body mass index is 72 9 kg/m²  Input and Output Summary (last 24 hours):      Intake/Output Summary (Last 24 hours) at 2022 1439  Last data filed at 2022 1204  Gross per 24 hour   Intake 480 ml   Output 3225 ml   Net -2745 ml Physical Exam:   Physical Exam  Vitals and nursing note reviewed  Constitutional:       General: He is not in acute distress  Appearance: Normal appearance  He is obese  Interventions: Nasal cannula in place  HENT:      Head: Normocephalic  Mouth/Throat:      Mouth: Mucous membranes are moist    Eyes:      Pupils: Pupils are equal, round, and reactive to light  Cardiovascular:      Rate and Rhythm: Normal rate  Rhythm irregular  Heart sounds: No murmur heard  Pulmonary:      Effort: Pulmonary effort is normal  No respiratory distress  Breath sounds: Normal breath sounds  No wheezing, rhonchi or rales  Abdominal:      General: Bowel sounds are normal  There is no distension  Palpations: Abdomen is soft  Tenderness: There is no abdominal tenderness  There is no guarding  Musculoskeletal:         General: No deformity  Cervical back: Normal range of motion  Right lower leg: Edema present  Left lower leg: Edema present  Skin:     Capillary Refill: Capillary refill takes less than 2 seconds  Neurological:      General: No focal deficit present  Mental Status: He is alert and oriented to person, place, and time  Mental status is at baseline           Additional Data:     Labs:  Results from last 7 days   Lab Units 04/03/22  1224   WBC Thousand/uL 4 96   HEMOGLOBIN g/dL 11 9*   HEMATOCRIT % 40 3   PLATELETS Thousands/uL 201   NEUTROS PCT % 72   LYMPHS PCT % 17   MONOS PCT % 8   EOS PCT % 2     Results from last 7 days   Lab Units 04/06/22  0556 04/05/22  0554 04/05/22  0554 04/04/22  0429 04/03/22  1224   SODIUM mmol/L 140   < > 143   < > 144   POTASSIUM mmol/L 3 7   < > 3 6   < > 3 2*   CHLORIDE mmol/L 92*   < > 95*   < > 94*   CO2 mmol/L >45*   < > 44*   < > >45*   BUN mg/dL 14   < > 15   < > 12   CREATININE mg/dL 0 87   < > 0 85   < > 0 90   ANION GAP mmol/L  --   --  4  --   --    CALCIUM mg/dL 8 8   < > 8 8   < > 8 3   ALBUMIN g/dL  --   -- --   --  2 7*   TOTAL BILIRUBIN mg/dL  --   --   --   --  0 48   ALK PHOS U/L  --   --   --   --  69   ALT U/L  --   --   --   --  24   AST U/L  --   --   --   --  17   GLUCOSE RANDOM mg/dL 123   < > 130   < > 129    < > = values in this interval not displayed  Lines/Drains:  Invasive Devices  Report    Peripheral Intravenous Line            Peripheral IV 04/03/22 Left Antecubital 3 days                  Telemetry:  Telemetry Orders (From admission, onward)             48 Hour Telemetry Monitoring  Continuous x 48 hours        References:    Telemetry Guidelines   Question:  Reason for 48 Hour Telemetry  Answer:  Acute Decompensated CHF (continuous diuretic infusion or total diuretic dose > 200 mg daily, associated electrolyte derangement, ionotropic drip, history of ventricular arrhythmia, or new EF <35%)                 Telemetry Reviewed: Atrial fibrillation  HR averaging 90  Indication for Continued Telemetry Use: Arrthymias requiring medical therapy             Imaging: No pertinent imaging reviewed      Recent Cultures (last 7 days):         Last 24 Hours Medication List:   Current Facility-Administered Medications   Medication Dose Route Frequency Provider Last Rate    acetaminophen  975 mg Oral [facility] Gagan Victoria, PA-C      aluminum-magnesium hydroxide-simethicone  30 mL Oral Q6H PRN Gagan Victoria, PA-C      budesonide-formoterol  2 puff Inhalation BID Gagan Victoria, PA-C      buPROPion  450 mg Oral Daily Gagan Victoria, PA-C      dabigatran etexilate  150 mg Oral BID Gagan Victoria, PA-C      dicyclomine  20 mg Oral 4x Daily [facility] Gagan Victoria, PA-C      diphenhydrAMINE  25 mg Intravenous Q6H PRN Gagan Victoria, PA-C      docusate sodium  100 mg Oral BID Gagan Victoria, PA-C      famotidine  20 mg Oral Daily Gagan Victoria, PA-C      furosemide  80 mg Intravenous BID (diuretic) Vy Dahiana, DO      ipratropium  0 5 mg Nebulization TID Leeann Slanader Philip,       levalbuterol  1 25 mg Nebulization TID Wanda Overcast, DO      levothyroxine  25 mcg Oral Early Morning Sam Liriano PA-C      metoprolol tartrate  100 mg Oral Q12H [address] Dareen Primes, CRNP      morphine injection  4 mg Intravenous Q4H PRN Sam Liriano, PA-JAMES      pantoprazole  40 mg Oral Daily ALESSANDRO Daly-JAMES      polyethylene glycol  17 g Oral Daily PRN ALESSANDRO Daly-C      potassium chloride  40 mEq Oral Daily Sam Liriano, PA-JAMES      spironolactone  50 mg Oral Daily Sam Liriano PA-C          Today, Patient Was Seen By: Arash Pickard PA-C    **Please Note: This note may have been constructed using a voice recognition system  **

## 2022-04-07 LAB
ANION GAP SERPL CALCULATED.3IONS-SCNC: 1 MMOL/L (ref 4–13)
BUN SERPL-MCNC: 18 MG/DL (ref 5–25)
CALCIUM SERPL-MCNC: 9 MG/DL (ref 8.3–10.1)
CHLORIDE SERPL-SCNC: 94 MMOL/L (ref 100–108)
CO2 SERPL-SCNC: 44 MMOL/L (ref 21–32)
CREAT SERPL-MCNC: 0.87 MG/DL (ref 0.6–1.3)
GFR SERPL CREATININE-BSD FRML MDRD: 99 ML/MIN/1.73SQ M
GLUCOSE SERPL-MCNC: 113 MG/DL (ref 65–140)
POTASSIUM SERPL-SCNC: 3.5 MMOL/L (ref 3.5–5.3)
SODIUM SERPL-SCNC: 139 MMOL/L (ref 136–145)

## 2022-04-07 PROCEDURE — 94760 N-INVAS EAR/PLS OXIMETRY 1: CPT

## 2022-04-07 PROCEDURE — 99232 SBSQ HOSP IP/OBS MODERATE 35: CPT | Performed by: INTERNAL MEDICINE

## 2022-04-07 PROCEDURE — 94640 AIRWAY INHALATION TREATMENT: CPT

## 2022-04-07 PROCEDURE — 99233 SBSQ HOSP IP/OBS HIGH 50: CPT | Performed by: PHYSICIAN ASSISTANT

## 2022-04-07 PROCEDURE — 80048 BASIC METABOLIC PNL TOTAL CA: CPT | Performed by: PHYSICIAN ASSISTANT

## 2022-04-07 PROCEDURE — 94660 CPAP INITIATION&MGMT: CPT

## 2022-04-07 PROCEDURE — 94003 VENT MGMT INPAT SUBQ DAY: CPT

## 2022-04-07 RX ORDER — METOLAZONE 5 MG/1
5 TABLET ORAL ONCE
Status: COMPLETED | OUTPATIENT
Start: 2022-04-07 | End: 2022-04-07

## 2022-04-07 RX ORDER — POTASSIUM CHLORIDE 20 MEQ/1
40 TABLET, EXTENDED RELEASE ORAL 2 TIMES DAILY
Status: DISCONTINUED | OUTPATIENT
Start: 2022-04-07 | End: 2022-04-10 | Stop reason: HOSPADM

## 2022-04-07 RX ORDER — LIDOCAINE 50 MG/G
1 PATCH TOPICAL DAILY
Status: DISCONTINUED | OUTPATIENT
Start: 2022-04-07 | End: 2022-04-10 | Stop reason: HOSPADM

## 2022-04-07 RX ORDER — POTASSIUM CHLORIDE 20 MEQ/1
40 TABLET, EXTENDED RELEASE ORAL ONCE
Status: COMPLETED | OUTPATIENT
Start: 2022-04-07 | End: 2022-04-07

## 2022-04-07 RX ADMIN — BUDESONIDE AND FORMOTEROL FUMARATE DIHYDRATE 2 PUFF: 160; 4.5 AEROSOL RESPIRATORY (INHALATION) at 18:22

## 2022-04-07 RX ADMIN — METOLAZONE 5 MG: 5 TABLET ORAL at 16:56

## 2022-04-07 RX ADMIN — METOPROLOL TARTRATE 100 MG: 100 TABLET, FILM COATED ORAL at 08:55

## 2022-04-07 RX ADMIN — ACETAMINOPHEN 325MG 975 MG: 325 TABLET ORAL at 12:46

## 2022-04-07 RX ADMIN — DICYCLOMINE HYDROCHLORIDE 20 MG: 20 TABLET ORAL at 12:46

## 2022-04-07 RX ADMIN — LEVALBUTEROL HYDROCHLORIDE 1.25 MG: 1.25 SOLUTION RESPIRATORY (INHALATION) at 13:33

## 2022-04-07 RX ADMIN — FAMOTIDINE 20 MG: 20 TABLET, FILM COATED ORAL at 08:55

## 2022-04-07 RX ADMIN — IPRATROPIUM BROMIDE 0.5 MG: 0.5 SOLUTION RESPIRATORY (INHALATION) at 21:23

## 2022-04-07 RX ADMIN — LEVALBUTEROL HYDROCHLORIDE 1.25 MG: 1.25 SOLUTION RESPIRATORY (INHALATION) at 21:23

## 2022-04-07 RX ADMIN — DABIGATRAN ETEXILATE MESYLATE 150 MG: 150 CAPSULE ORAL at 18:20

## 2022-04-07 RX ADMIN — POTASSIUM CHLORIDE 40 MEQ: 1500 TABLET, EXTENDED RELEASE ORAL at 18:19

## 2022-04-07 RX ADMIN — METOPROLOL TARTRATE 100 MG: 100 TABLET, FILM COATED ORAL at 21:17

## 2022-04-07 RX ADMIN — PANTOPRAZOLE SODIUM 40 MG: 40 TABLET, DELAYED RELEASE ORAL at 08:56

## 2022-04-07 RX ADMIN — DICYCLOMINE HYDROCHLORIDE 20 MG: 20 TABLET ORAL at 21:18

## 2022-04-07 RX ADMIN — POTASSIUM CHLORIDE 40 MEQ: 1500 TABLET, EXTENDED RELEASE ORAL at 14:40

## 2022-04-07 RX ADMIN — IPRATROPIUM BROMIDE 0.5 MG: 0.5 SOLUTION RESPIRATORY (INHALATION) at 13:33

## 2022-04-07 RX ADMIN — DICYCLOMINE HYDROCHLORIDE 20 MG: 20 TABLET ORAL at 08:55

## 2022-04-07 RX ADMIN — DABIGATRAN ETEXILATE MESYLATE 150 MG: 150 CAPSULE ORAL at 08:55

## 2022-04-07 RX ADMIN — LIDOCAINE 5% 1 PATCH: 700 PATCH TOPICAL at 12:46

## 2022-04-07 RX ADMIN — DICYCLOMINE HYDROCHLORIDE 20 MG: 20 TABLET ORAL at 16:56

## 2022-04-07 RX ADMIN — DOCUSATE SODIUM 100 MG: 100 CAPSULE, LIQUID FILLED ORAL at 08:55

## 2022-04-07 RX ADMIN — BUPROPION HYDROCHLORIDE 450 MG: 150 TABLET, FILM COATED, EXTENDED RELEASE ORAL at 08:55

## 2022-04-07 RX ADMIN — ACETAMINOPHEN 325MG 975 MG: 325 TABLET ORAL at 21:18

## 2022-04-07 RX ADMIN — FUROSEMIDE 80 MG: 10 INJECTION, SOLUTION INTRAMUSCULAR; INTRAVENOUS at 08:55

## 2022-04-07 RX ADMIN — LEVOTHYROXINE SODIUM 25 MCG: 25 TABLET ORAL at 05:29

## 2022-04-07 RX ADMIN — MORPHINE SULFATE 4 MG: 4 INJECTION INTRAVENOUS at 04:37

## 2022-04-07 RX ADMIN — SPIRONOLACTONE 50 MG: 25 TABLET ORAL at 08:55

## 2022-04-07 RX ADMIN — MORPHINE SULFATE 4 MG: 4 INJECTION INTRAVENOUS at 08:55

## 2022-04-07 RX ADMIN — BUDESONIDE AND FORMOTEROL FUMARATE DIHYDRATE 2 PUFF: 160; 4.5 AEROSOL RESPIRATORY (INHALATION) at 08:55

## 2022-04-07 RX ADMIN — POTASSIUM CHLORIDE 40 MEQ: 1500 TABLET, EXTENDED RELEASE ORAL at 08:55

## 2022-04-07 RX ADMIN — LEVALBUTEROL HYDROCHLORIDE 1.25 MG: 1.25 SOLUTION RESPIRATORY (INHALATION) at 07:33

## 2022-04-07 RX ADMIN — ACETAMINOPHEN 325MG 975 MG: 325 TABLET ORAL at 05:29

## 2022-04-07 RX ADMIN — IPRATROPIUM BROMIDE 0.5 MG: 0.5 SOLUTION RESPIRATORY (INHALATION) at 07:33

## 2022-04-07 NOTE — ASSESSMENT & PLAN NOTE
· Discussed discontinuing IV morphine with patient, patient agreeable  · Will trial Tylenol, Lidoderm patches

## 2022-04-07 NOTE — ASSESSMENT & PLAN NOTE
· Patient presents with chest pain and shortness of breath related to AFib RVR  Presented with heart rate of 130  · Was briefly on IV Cardizem infusion upon admission, now discontinued  · Continue Lopressor 100 mg b i d ,  Rates improved and BP tolerating  · No longer on digoxin  · Continue Pradaxa for anticoagulation  · Cardiology following  · Monitor rate control on telemetry    · Heart rate averaging low 90s

## 2022-04-07 NOTE — ASSESSMENT & PLAN NOTE
· Body mass index is 72 87 kg/m²  · Outpatient bariatric surgery referral has been placed during a prior admission    · Dietary modifications

## 2022-04-07 NOTE — ASSESSMENT & PLAN NOTE
Wt Readings from Last 3 Encounters:   04/07/22 (!) 237 kg (522 lb 7 8 oz)   03/17/22 (!) 256 kg (564 lb 9 6 oz)   02/22/22 (!) 242 kg (534 lb)     · Very difficult to evaluate volume status however has been compliant with torsemide  · Home regimen is torsemide 40 mg b i d  Jai Ferreira · Was started on IV Lasix 80 mg b i d  And spironolactone  · Weight today 518  With dry weight of 519 and Urine output slowing by 4/7 so cardiology started metolazone prior to p m  Lasix dose  · Monitor for improvement of output, continue to monitor daily weight  · Cardiology following    · Monitor BMP

## 2022-04-07 NOTE — PROGRESS NOTES
General Cardiology   Progress Note -  Team One   Marysol Prior 47 y o  male MRN: [id number]    Unit/Bed#: S -01 Encounter: [id number]    Assessment  1  Acute on chronic hypoxic and hypercapnic respiratory failure  Multifactorial in setting of acute diastolic CHF and morbid obesity with OHS  Currently 95-96% on 4LNC, typically on 2LNC  2  Acute on chronic diastolic CHF  Presented with weight gain, shortness of breath, LE edema  NT proBNP 1100 (up from 700)  Home diuretic: torsemide 40 mg BID, spironolactone 50 mg daily  Dry weight:  Recently 519 lb per Daphne Means lift  Current weight: unchanged today 522 lb today  I/O: 2 4 L UOP, -1 6L/24 hours  Net negative 5 L  Creatinine stable, K 3 5  Echo limited by body habitus but LV function estimated at 55%  3  Permanent atrial fibrillation, presenting with RVR  Home AVN blockers: metoprolol tartrate 50 mg BID  OAC: Xarelto  Rates 90s-110 per tele review  4  Hypertension- controlled, average BP 118/55  5   History of DVT s/p IVC filter- on Xarelto  6  Morbid obesity with obesity hypoventilation syndrome- BMI 72 9  7  FILIPPO- compliant with CPAP at HS     Plan  1  Continue Lasix 80 mg IV BID and spironolactone for net negative fluid balance  Add 5 mg metolazone prior to PM dose of Lasix today  2  Continue increased metoprolol dose- 100 mg BID  Rates somewhat faster today- 90s-110  If still elevated tomorrow then add diltiazem  3  Continue anticoagulation with Xarelto  4  Strict I/Os, daily bed weights  5   2 gm Na diet with 1500 mL FR  6  AM BMP    Subjective  Still having twitching but SOB and palpitations continue to improve  Review of Systems   Constitutional: Negative for chills, malaise/fatigue and weight gain  Cardiovascular: Negative for chest pain, dyspnea on exertion, leg swelling, orthopnea, palpitations and syncope  Respiratory: Negative for cough, shortness of breath, sleep disturbances due to breathing and sputum production      Gastrointestinal: Negative for bloating and nausea  Neurological: Negative for dizziness, light-headedness and weakness  Psychiatric/Behavioral: Negative for altered mental status  All other systems reviewed and are negative  Objective:   Vitals: Blood pressure 119/56, pulse 105, temperature 98 3 °F (36 8 °C), temperature source Oral, resp  rate 18, height 5' 11" (1 803 m), weight (!) 237 kg (522 lb 7 8 oz), SpO2 99 %  , Body mass index is 72 87 kg/m²  ,     Systolic (12XLM), OTH:919 , Min:118 , PYJ:459     Diastolic (91WSY), GVE:09, Min:52, Max:56    Intake/Output Summary (Last 24 hours) at 4/7/2022 1316  Last data filed at 4/6/2022 2020  Gross per 24 hour   Intake 360 ml   Output 600 ml   Net -240 ml     Wt Readings from Last 3 Encounters:   04/07/22 (!) 237 kg (522 lb 7 8 oz)   03/17/22 (!) 256 kg (564 lb 9 6 oz)   02/22/22 (!) 242 kg (534 lb)     Telemetry Review: Afib, rates 90s-110    Physical Exam  Vitals reviewed  Constitutional:       General: He is not in acute distress  Appearance: He is obese  Neck:      Vascular: No hepatojugular reflux or JVD  Cardiovascular:      Rate and Rhythm: Normal rate  Rhythm irregularly irregular  Heart sounds: Heart sounds are distant  No murmur heard  No friction rub  No gallop  Pulmonary:      Effort: Pulmonary effort is normal  No respiratory distress  Breath sounds: No rales  Abdominal:      General: Bowel sounds are normal  There is no distension  Palpations: Abdomen is soft  Tenderness: There is no abdominal tenderness  Musculoskeletal:         General: No tenderness  Normal range of motion  Cervical back: Neck supple  Right lower leg: Edema present  Left lower leg: Edema present  Skin:     General: Skin is warm and dry  Findings: No erythema  Neurological:      Mental Status: He is alert and oriented to person, place, and time     Psychiatric:         Mood and Affect: Mood normal      LABORATORY RESULTS      CBC with diff:   Results from last 7 days   Lab Units 04/03/22  1224   WBC Thousand/uL 4 96   HEMOGLOBIN g/dL 11 9*   HEMATOCRIT % 40 3   MCV fL 98   PLATELETS Thousands/uL 201   MCH pg 28 9   MCHC g/dL 29 5*   RDW % 14 6   MPV fL 10 1   NRBC AUTO /100 WBCs 0     CMP:  Results from last 7 days   Lab Units 04/07/22  0622 04/06/22  0556 04/05/22  0554 04/04/22  0429 04/03/22  1224   POTASSIUM mmol/L 3 5 3 7 3 6 3 8 3 2*   CHLORIDE mmol/L 94* 92* 95* 94* 94*   CO2 mmol/L 44* >45* 44* >45* >45*   BUN mg/dL 18 14 15 12 12   CREATININE mg/dL 0 87 0 87 0 85 0 89 0 90   CALCIUM mg/dL 9 0 8 8 8 8 8 8 8 3   AST U/L  --   --   --   --  17   ALT U/L  --   --   --   --  24   ALK PHOS U/L  --   --   --   --  69   EGFR ml/min/1 73sq m 99 99 100 98 98     BMP:  Results from last 7 days   Lab Units 04/07/22  0622 04/06/22  0556 04/05/22  0554 04/04/22  0429 04/03/22  1224   POTASSIUM mmol/L 3 5 3 7 3 6 3 8 3 2*   CHLORIDE mmol/L 94* 92* 95* 94* 94*   CO2 mmol/L 44* >45* 44* >45* >45*   BUN mg/dL 18 14 15 12 12   CREATININE mg/dL 0 87 0 87 0 85 0 89 0 90   CALCIUM mg/dL 9 0 8 8 8 8 8 8 8 3       Lab Results   Component Value Date    CREATININE 0 87 04/07/2022    CREATININE 0 87 04/06/2022    CREATININE 0 85 04/05/2022       Lab Results   Component Value Date    NTBNP 1,121 (H) 04/03/2022    NTBNP 752 (H) 03/13/2022    NTBNP 607 (H) 02/18/2022      Results from last 7 days   Lab Units 04/06/22  0556 04/05/22  0554 04/04/22  0429 04/03/22  1224   MAGNESIUM mg/dL 2 1 1 9 1 9 1 7      Results from last 7 days   Lab Units 04/03/22  1224   TSH 3RD GENERATON uIU/mL 1 982     Lipid Profile:   No results found for: CHOL  Lab Results   Component Value Date    HDL 44 08/16/2016     Lab Results   Component Value Date    LDLCALC 80 08/16/2016     Lab Results   Component Value Date    TRIG 197 (H) 08/16/2016     Cardiac testing:   Results for orders placed during the hospital encounter of 05/10/21    Echo complete with contrast if indicated    Narrative  [facility] [address] [place] [address]    Transthoracic Echocardiogram  2D, M-mode, Doppler, and Color Doppler    Study date:  11-May-2021    Patient: Ene Burton  MR number: [id number]  Account number: [de-identified]  : 1970  Age: 49 years  Gender: Male  Status: Outpatient  Location: [facility]  Height: 70 in  Weight: 409 2 lb  BP: 121/ 53 mmHg    Indications: Heart failure    Diagnoses: I50 9 - Heart failure, unspecified    Sonographer:  JOSE Barrow  Referring Physician:  Seven Hinton MD  Group:  [facility]  Interpreting Physician:  Elyse Calvillo MD    SUMMARY    PROCEDURE INFORMATION:  This was a technically difficult study  LEFT VENTRICLE:  Systolic function was normal  Ejection fraction was estimated to be 60 %  This study was inadequate for the evaluation of regional wall motion  HISTORY: PRIOR HISTORY: afib, CHF, morbid obesity, DVT, COPD, DM, hypothyroid, MRSA    PROCEDURE: The study was performed in the [facility]  This was a routine study  The transthoracic approach was used  The study included complete 2D imaging, M-mode, complete spectral Doppler, and color Doppler  The heart rate was 86  bpm, at the start of the study  Images were obtained from the parasternal, apical, subcostal, and suprasternal notch acoustic windows  Echocardiographic views were limited due to restricted patient mobility, poor acoustic window  availability, decreased penetration, and lung interference  This was a technically difficult study  LEFT VENTRICLE: Size was normal  Systolic function was normal  Ejection fraction was estimated to be 60 %  This study was inadequate for the evaluation of regional wall motion  Wall thickness could not be accurately determined  DOPPLER:  The study was not technically sufficient to allow evaluation of LV diastolic function  RIGHT VENTRICLE: Systolic function was normal  Not well visualized      LEFT ATRIUM: Not well visualized  RIGHT ATRIUM: Not well visualized  MITRAL VALVE: Not well visualized  DOPPLER: The transmitral velocity was within the normal range  There was trace regurgitation  AORTIC VALVE: The valve was probably trileaflet  The valve was not well visualized  DOPPLER: Transaortic velocity was within the normal range  There was no evidence for stenosis  TRICUSPID VALVE: Not well visualized  DOPPLER: The transtricuspid velocity was within the normal range  There was trace regurgitation  Pulmonary artery systolic pressure was within the normal range  Estimated peak PA pressure was 27 mmHg  PULMONIC VALVE: Not well visualized  DOPPLER: The transpulmonic velocity was within the normal range  There was trace regurgitation  PERICARDIUM: Not well visualized  AORTA: Not well visualized      SYSTEM MEASUREMENT TABLES    2D  Ao Diam: 3 11 cm  LA Diam: 4 34 cm    CW  AV Vmax: 1 22 m/s  AV maxP 91 mmHg  PV Vmax: 1 13 m/s  PV maxP 13 mmHg  TR Vmax: 2 72 m/s  TR maxP 6 mmHg    [facility] Accredited Echocardiography Laboratory    Prepared and electronically signed by    Darlene Zhao MD  Signed 11-May-2021 11:37:20    Meds/Allergies   all current active meds have been reviewed and current meds:   Current Facility-Administered Medications   Medication Dose Route Frequency    acetaminophen (TYLENOL) tablet 975 mg  975 mg Oral Q8H [address]    aluminum-magnesium hydroxide-simethicone (MYLANTA) oral suspension 30 mL  30 mL Oral Q6H PRN    budesonide-formoterol (SYMBICORT) 160-4 5 mcg/act inhaler 2 puff  2 puff Inhalation BID    buPROPion (WELLBUTRIN XL) 24 hr tablet 450 mg  450 mg Oral Daily    dabigatran etexilate (PRADAXA) capsule 150 mg  150 mg Oral BID    dicyclomine (BENTYL) tablet 20 mg  20 mg Oral 4x Daily (AC & HS)    diphenhydrAMINE (BENADRYL) injection 25 mg  25 mg Intravenous Q6H PRN    docusate sodium (COLACE) capsule 100 mg  100 mg Oral BID    famotidine (PEPCID) tablet 20 mg  20 mg Oral Daily    furosemide (LASIX) injection 80 mg  80 mg Intravenous BID (diuretic)    ipratropium (ATROVENT) 0 02 % inhalation solution 0 5 mg  0 5 mg Nebulization TID    levalbuterol (XOPENEX) inhalation solution 1 25 mg  1 25 mg Nebulization TID    levothyroxine tablet 25 mcg  25 mcg Oral Early Morning    lidocaine (LIDODERM) 5 % patch 1 patch  1 patch Topical Daily    metolazone (ZAROXOLYN) tablet 5 mg  5 mg Oral Once    metoprolol tartrate (LOPRESSOR) tablet 100 mg  100 mg Oral Q12H [facility]    pantoprazole (PROTONIX) EC tablet 40 mg  40 mg Oral Daily    polyethylene glycol (MIRALAX) packet 17 g  17 g Oral Daily PRN    potassium chloride (K-DUR,KLOR-CON) CR tablet 40 mEq  40 mEq Oral BID    spironolactone (ALDACTONE) tablet 50 mg  50 mg Oral Daily     Medications Prior to Admission   Medication    acetaminophen (TYLENOL) 500 mg tablet    albuterol (2 5 mg/3 mL) 0 083 % nebulizer solution    aluminum-magnesium hydroxide-simethicone (MYLANTA) 200-200-20 mg/5 mL suspension    ammonium lactate (LAC-HYDRIN) 12 % cream    budesonide-formoterol (SYMBICORT) 160-4 5 mcg/act inhaler    buPROPion (FORFIVO XL) 450 MG 24 hr tablet    Cholecalciferol (VITAMIN D) 50 MCG (2000 UT) tablet    cyanocobalamin (VITAMIN B-12) 1000 MCG tablet    dabigatran etexilate (PRADAXA) 150 mg capsu    dicyclomine (BENTYL) 20 mg tablet    digoxin (LANOXIN) 0 125 mg tablet    docusate sodium (COLACE) 100 mg capsule    famotidine (PEPCID) 20 mg tablet    gabapentin (NEURONTIN) 300 mg capsule    hydrOXYzine pamoate (VISTARIL) 25 mg capsule    ipratropium-albuterol (DUO-NEB) 0 5-2 5 mg/3 mL nebulizer solution    levothyroxine 25 mcg tablet    metoclopramide (REGLAN) 10 mg tablet    metoprolol tartrate (LOPRESSOR) 50 mg tablet    nystatin (MYCOSTATIN) cream    ondansetron (ZOFRAN) 4 mg tablet    pantoprazole (PROTONIX) 20 mg tablet    polyethylene glycol (MIRALAX) 17 g packet    Potassium Chloride (KCL-20 PO)    spironolactone (ALDACTONE) 50 mg tablet    torsemide 40 MG TABS    sodium chloride (OCEAN) 0 65 % nasal spray     Counseling / Coordination of Care  Total floor / unit time spent today 20 minutes  Greater than 50% of total time was spent with the patient and / or family counseling and / or coordination of care  ** Please Note: Dragon 360 Dictation voice to text software may have been used in the creation of this document   **

## 2022-04-07 NOTE — PLAN OF CARE
Problem: Potential for Falls  Goal: Patient will remain free of falls  Description: INTERVENTIONS:  - Educate patient/family on patient safety including physical limitations  - Instruct patient to call for assistance with activity   - Consult OT/PT to assist with strengthening/mobility   - Keep Call bell within reach  - Keep bed low and locked with side rails adjusted as appropriate  - Keep care items and personal belongings within reach  - Initiate and maintain comfort rounds  - Make Fall Risk Sign visible to staff  - Offer Toileting every  Hours, in advance of need  - Initiate/Maintain alarm  - Obtain necessary fall risk management equipment:   - Apply yellow socks and bracelet for high fall risk patients  - Consider moving patient to room near nurses station  Outcome: Progressing     Problem: MOBILITY - ADULT  Goal: Maintain or return to baseline ADL function  Description: INTERVENTIONS:  -  Assess patient's ability to carry out ADLs; assess patient's baseline for ADL function and identify physical deficits which impact ability to perform ADLs (bathing, care of mouth/teeth, toileting, grooming, dressing, etc )  - Assess/evaluate cause of self-care deficits   - Assess range of motion  - Assess patient's mobility; develop plan if impaired  - Assess patient's need for assistive devices and provide as appropriate  - Encourage maximum independence but intervene and supervise when necessary  - Involve family in performance of ADLs  - Assess for home care needs following discharge   - Consider OT consult to assist with ADL evaluation and planning for discharge  - Provide patient education as appropriate  Outcome: Progressing  Goal: Maintains/Returns to pre admission functional level  Description: INTERVENTIONS:  - Perform BMAT or MOVE assessment daily    - Set and communicate daily mobility goal to care team and patient/family/caregiver     - Collaborate with rehabilitation services on mobility goals if consulted  - Perform Range of Motion  times a day  - Reposition patient every  hours  - Dangle patient  times a day  - Stand patient  times a day  - Ambulate patient  times a day  - Out of bed to chair times a day   - Out of bed for meals  times a day  - Out of bed for toileting  - Record patient progress and toleration of activity level   Outcome: Progressing     Problem: Prexisting or High Potential for Compromised Skin Integrity  Goal: Skin integrity is maintained or improved  Description: INTERVENTIONS:  - Identify patients at risk for skin breakdown  - Assess and monitor skin integrity  - Assess and monitor nutrition and hydration status  - Monitor labs   - Assess for incontinence   - Turn and reposition patient  - Assist with mobility/ambulation  - Relieve pressure over bony prominences  - Avoid friction and shearing  - Provide appropriate hygiene as needed including keeping skin clean and dry  - Evaluate need for skin moisturizer/barrier cream  - Collaborate with interdisciplinary team   - Patient/family teaching  - Consider wound care consult   Outcome: Progressing     Problem: Nutrition/Hydration-ADULT  Goal: Nutrient/Hydration intake appropriate for improving, restoring or maintaining nutritional needs  Description: Monitor and assess patient's nutrition/hydration status for malnutrition  Collaborate with interdisciplinary team and initiate plan and interventions as ordered  Monitor patient's weight and dietary intake as ordered or per policy  Utilize nutrition screening tool and intervene as necessary  Determine patient's food preferences and provide high-protein, high-caloric foods as appropriate       INTERVENTIONS:  - Monitor oral intake, urinary output, labs, and treatment plans  - Assess nutrition and hydration status and recommend course of action  - Evaluate amount of meals eaten  - Assist patient with eating if necessary   - Allow adequate time for meals  - Recommend/ encourage appropriate diets, oral nutritional supplements, and vitamin/mineral supplements  - Order, calculate, and assess calorie counts as needed  - Recommend, monitor, and adjust tube feedings and TPN/PPN based on assessed needs  - Assess need for intravenous fluids  - Provide specific nutrition/hydration education as appropriate  - Include patient/family/caregiver in decisions related to nutrition  Outcome: Progressing

## 2022-04-07 NOTE — ASSESSMENT & PLAN NOTE
· Started on IV morphine on admission    · Unclear if morphine was started for chest pain versus knee pain but appears patient is not on any opiates for his chronic knee pain as an outpatient  · Discussed discontinuing IV morphine today with patient, patient agreeable  · Will trial Tylenol, Lidoderm patches

## 2022-04-07 NOTE — APP STUDENT NOTE
Atrial Fibrillation with RVR (HCC)  · patient has some intermittent chest pain, but it is very mild and goes away quickly, it is much improved compared to previous days  · presented 3 days ago with a HR of 130, but last 24hrs his HR ranges from   · if HR consistently hits 115 start IV Cardizem until HR ranges below 100   · maintain HR and angina with Lopressor 100mg BID  · continue Pradaxa for anticoagulation  · monitor with tele  Acute on Chronic Diastolic Congestive Heart failure  · Home regimen is torsemide 40mg BID  · During hospital stay, on IV lasik 80mg BID, continue  · cardiology evaluation  · Continue cardiac diet and 1500ml fluid restriction  · Monitor I/Os and DW  Chronic Respiratory failure with hypoxia  · Baseline is 2L/min chronic of oxygen  · currently on 3L/min and still feels less SOB compared to the days prior  · CO2 93 1 as of today  · SpO2 is 94%  · wean down to 2L/min as tolerated  ·   Hypothyroidism  · TSH WNL  · continue synthroid  ·    Diabetes Mellitus  · Maintained by diet, A1C is 5 4     Morbid Obesity  · BMI is 73 29kg/m2  · Dietician Consult  · Bariatric sx referral     VTE Pharmacologic Prophylaxis: VTE Score: 7 High Risk (Score >/= 5) - Pharmacological DVT Prophylaxis Ordered: dabigatran (Pradaxa)  Sequential Compression Devices Ordered  Patient Centered Rounds: I performed bedside rounds with nursing staff today  Discussions with Specialists or Other Care Team Provider:     Education and Discussions with Family / Patient: Patient declined call to   Time Spent for Care: 20 minutes  More than 50% of total time spent on counseling and coordination of care as described above  Current Length of Stay: 3 day(s)  Current Patient Status: Inpatient   Certification Statement: The patient will continue to require additional inpatient hospital stay due to arrythmia control  Discharge Plan: Anticipate discharge in 48 hrs to rehab facility      Code Status: Level 1 - Full Code    Subjective:   Patient states he feels more twitchy today, but his chest pain has been diminished significantly  He only experiences it once in a while and when he does it's hardly noticeable  He also feels less short of breath compared to the past few days and would like to be titrated down to his baseline of 2L/min for oxygen  Objective:     Vitals:   Temp (24hrs), Av °F (36 7 °C), Min:97 8 °F (36 6 °C), Max:98 3 °F (36 8 °C)    Temp:  [97 8 °F (36 6 °C)-98 3 °F (36 8 °C)] 98 3 °F (36 8 °C)  HR:  [102-108] 105  Resp:  [18] 18  BP: (118-119)/(52-56) 119/56  SpO2:  [93 %-99 %] 99 %  Body mass index is 72 87 kg/m²  Input and Output Summary (last 24 hours): Intake/Output Summary (Last 24 hours) at 2022 0947  Last data filed at 2022  Gross per 24 hour   Intake 540 ml   Output 2025 ml   Net -1485 ml       Physical Exam:   Physical Exam  Constitutional:       General: He is not in acute distress  Appearance: He is not ill-appearing or toxic-appearing  Comments: morbidly obese   HENT:      Head: Normocephalic  Mouth/Throat:      Mouth: Mucous membranes are moist       Pharynx: Oropharynx is clear  Eyes:      Extraocular Movements: Extraocular movements intact  Conjunctiva/sclera: Conjunctivae normal       Pupils: Pupils are equal, round, and reactive to light  Cardiovascular:      Rate and Rhythm: Normal rate and regular rhythm  Pulses: Normal pulses  Heart sounds: Normal heart sounds  No murmur heard  No friction rub  No gallop  Pulmonary:      Effort: No respiratory distress  Breath sounds: No stridor  No wheezing, rhonchi or rales  Chest:      Chest wall: No tenderness  Abdominal:      General: There is distension  Hernia: A hernia is present  Musculoskeletal:         General: Swelling present  Normal range of motion  Right lower leg: Edema present  Left lower leg: Edema present     Skin:     General: Skin is warm and dry       Capillary Refill: Capillary refill takes less than 2 seconds  Neurological:      General: No focal deficit present  Mental Status: He is alert and oriented to person, place, and time  Mental status is at baseline  Additional Data:     Labs:  Results from last 7 days   Lab Units 22  1224   WBC Thousand/uL 4 96   HEMOGLOBIN g/dL 11 9*   HEMATOCRIT % 40 3   PLATELETS Thousands/uL 201   NEUTROS PCT % 72   LYMPHS PCT % 17   MONOS PCT % 8   EOS PCT % 2     Results from last 7 days   Lab Units 22  0622 22  0429 22  1224   SODIUM mmol/L 139   < > 144   POTASSIUM mmol/L 3 5   < > 3 2*   CHLORIDE mmol/L 94*   < > 94*   CO2 mmol/L 44*   < > >45*   BUN mg/dL 18   < > 12   CREATININE mg/dL 0 87   < > 0 90   ANION GAP mmol/L 1*   < >  --    CALCIUM mg/dL 9 0   < > 8 3   ALBUMIN g/dL  --   --  2 7*   TOTAL BILIRUBIN mg/dL  --   --  0 48   ALK PHOS U/L  --   --  69   ALT U/L  --   --  24   AST U/L  --   --  17   GLUCOSE RANDOM mg/dL 113   < > 129    < > = values in this interval not displayed  Lines/Drains:  Invasive Devices  Report    Peripheral Intravenous Line            Peripheral IV 22 Left Antecubital 3 days                  Telemetry:  Telemetry Orders (From admission, onward)             48 Hour Telemetry Monitoring  Continuous x 48 hours           References:    Telemetry Guidelines   Question:  Reason for 48 Hour Telemetry  Answer:  Acute Decompensated CHF (continuous diuretic infusion or total diuretic dose > 200 mg daily, associated electrolyte derangement, ionotropic drip, history of ventricular arrhythmia, or new EF <35%)                 Telemetry Reviewed: Atrial fibrillation   HR averaging 103  Indication for Continued Telemetry Use: Arrthymias requiring medical therapy             Imaging: Reviewed radiology reports from this admission including: xray(s)    Recent Cultures (last 7 days):         Last 24 Hours Medication List: Current Facility-Administered Medications   Medication Dose Route Frequency Provider Last Rate    acetaminophen  975 mg Oral [facility] Chely Krishan, PA-C      aluminum-magnesium hydroxide-simethicone  30 mL Oral Q6H PRN Chely Samelaniae, PA-C      budesonide-formoterol  2 puff Inhalation BID Chely Samelaniae, PA-C      buPROPion  450 mg Oral Daily Chely Samelaniae, PA-C      dabigatran etexilate  150 mg Oral BID Chely Samelaniae, PA-C      dicyclomine  20 mg Oral 4x Daily (AC & HS) Chely Samelaniae, PA-C      diphenhydrAMINE  25 mg Intravenous Q6H PRN Chely Samelaniae, PA-C      docusate sodium  100 mg Oral BID Chely Samelaniae, PA-C      famotidine  20 mg Oral Daily Chely Samelaniae, PA-C      furosemide  80 mg Intravenous BID (diuretic) Serjio Isaacs,       ipratropium  0 5 mg Nebulization TID Serjio Isaacs,       levalbuterol  1 25 mg Nebulization TID Serjio Isaacs,       levothyroxine  25 mcg Oral Early Morning Chely Krishan, PA-C      metoprolol tartrate  100 mg Oral Q12H [facility] RDAHA Marinelli      morphine injection  4 mg Intravenous Q4H PRN Chely Samelaniae, PA-C      pantoprazole  40 mg Oral Daily Chely Satrini, PA-C      polyethylene glycol  17 g Oral Daily PRN Chely Samelaniae, PA-C      potassium chloride  40 mEq Oral Daily Chely Saupe, PA-C      spironolactone  50 mg Oral Daily Chely Satrini, PA-C          Today, Patient Was Seen By: Pricilla Garrison    **Please Note: This note may have been constructed using a voice recognition system  **

## 2022-04-07 NOTE — PROGRESS NOTES
[address]   Progress Note - Kenney Alert 1970, 47 y o  male MRN: [id number]  Unit/Bed#: S -01 Encounter: [id number]  Primary Care Provider: Jam Hernandez MD   Date and time admitted to hospital: 4/3/2022 11:42 AM    Acute on chronic diastolic congestive heart failure [facility])  Assessment & Plan  Wt Readings from Last 3 Encounters:   04/07/22 (!) 237 kg (522 lb 7 8 oz)   03/17/22 (!) 256 kg (564 lb 9 6 oz)   02/22/22 (!) 242 kg (534 lb)     · Very difficult to evaluate volume status however has been compliant with torsemide  · Home regimen is torsemide 40 mg b i d  Jose G Pipe · Was started on IV Lasix 80 mg b i d  And spironolactone  · Stagnant weight loss today, remains at 522 lbs  With dry weight of 519 and Urine output slowing by 4/7 so cardiology to start metolazone prior to p m  Lasix dose  · Monitor for improvement of output, continue to monitor daily weight  · Cardiology following  · Monitor BMP    Tremor  Assessment & Plan  · Noted history of tremors, worsening while hospitalized here  · Stopped gabapentin  · This has been ongoing however he is very bothered by this  · Trial Benadryl, can also consider Cogentin  · Monitor for improvement as hypercapnea improves    Chronic respiratory failure with hypoxia (HCC)  Assessment & Plan  · Wears 2 liters/minute chronically, weaned from 4L to 3L NC today  · Monitor respiratory status  Hypothyroidism  Assessment & Plan  · TSH within normal limits  · Continue Synthroid    Knee pain  Assessment & Plan  · Started on IV morphine on admission  · Unclear if morphine was started for chest pain versus knee pain but appears patient is not on any opiates for his chronic knee pain as an outpatient  · Discussed discontinuing IV morphine today with patient, patient agreeable  · Will trial Tylenol, Lidoderm patches    Obesity hypoventilation syndrome/FILIPPO  Assessment & Plan  · Continue CPAP q h s      Diabetes mellitus [facility])  Assessment & Plan  Lab Results   Component Value Date    HGBA1C 5 4 03/21/2022       No results for input(s): POCGLU in the last 72 hours  Blood Sugar Average: Last 72 hrs:  · A1c 5 4   · Not maintained on anti-hyperglycemic medications  · ADA diet    Morbid obesity (HCC)  Assessment & Plan  · Body mass index is 72 87 kg/m²  · Outpatient bariatric surgery referral has been placed during a prior admission  · Dietary modifications    * Atrial fibrillation with RVR (HCC)  Assessment & Plan  · Patient presents with chest pain and shortness of breath related to AFib RVR  Presented with heart rate of 130  · Was briefly on IV Cardizem infusion upon admission, now discontinued  · Continue Lopressor 100 mg b i d ,  Rates improved and BP tolerating  · No longer on digoxin  · Continue Pradaxa for anticoagulation  · Cardiology following  · Monitor rate control on telemetry  · Heart rate averaging low 100s    VTE Pharmacologic Prophylaxis:   Pharmacologic: Dabigatran (Pradaxa)  Mechanical VTE Prophylaxis in Place: No    Patient Centered Rounds: I have performed bedside rounds with nursing staff today  Discussions with Specialists or Other Care Team Provider:  Nursing staff, Cardiology    Education and Discussions with Family / Patient:  Discussed plan of care with patient    Time Spent for Care: 45 minutes  More than 50% of total time spent on counseling and coordination of care as described above  Current Length of Stay: 3 day(s)    Current Patient Status: Inpatient   Certification Statement: The patient will continue to require additional inpatient hospital stay due to Ongoing IV diuresis    Discharge Plan:  Anticipate discharge within the next 48 hours likely to assisted living facility pending continued improvement of hyperkalemia    Code Status: Level 1 - Full Code      Subjective:   Patient reports improvement of respiratory status today, denies chest pain  Endorses intermittent palpitations with exertion    Reports mild chronic headache  Reports significant improvement of lower extremity swelling  Is still concerned about tremors  Denies nausea, vomiting, abdominal pain or diarrhea  Does endorse constipation but had a bowel movement yesterday  Denies urinary symptoms  Denies fever, chills  Objective:     Vitals:   Temp (24hrs), Av °F (36 7 °C), Min:97 8 °F (36 6 °C), Max:98 3 °F (36 8 °C)    Temp:  [97 8 °F (36 6 °C)-98 3 °F (36 8 °C)] 98 3 °F (36 8 °C)  HR:  [102-108] 105  Resp:  [18] 18  BP: (118-119)/(52-56) 119/56  SpO2:  [93 %-99 %] 99 %  Body mass index is 72 87 kg/m²  Input and Output Summary (last 24 hours): Intake/Output Summary (Last 24 hours) at 2022 1115  Last data filed at 2022  Gross per 24 hour   Intake 540 ml   Output 975 ml   Net -435 ml       Physical Exam:     Physical Exam  Vitals and nursing note reviewed  Constitutional:       General: He is not in acute distress  Appearance: He is obese  He is not toxic-appearing or diaphoretic  HENT:      Head: Normocephalic and atraumatic  Right Ear: External ear normal       Left Ear: External ear normal       Nose: Nose normal       Mouth/Throat:      Mouth: Mucous membranes are dry  Pharynx: Oropharynx is clear  Eyes:      General: No scleral icterus  Conjunctiva/sclera: Conjunctivae normal    Cardiovascular:      Rate and Rhythm: Normal rate  Rhythm irregular  Heart sounds: No murmur heard  Pulmonary:      Effort: Pulmonary effort is normal  No respiratory distress  Breath sounds: Normal breath sounds  No stridor  Chest:      Chest wall: No tenderness  Abdominal:      General: Bowel sounds are normal  There is no distension  Palpations: Abdomen is soft  Tenderness: There is no abdominal tenderness  There is no guarding or rebound  Musculoskeletal:         General: Swelling present  Cervical back: Normal range of motion        Comments: Bilateral lower extremity edema, no erythema or warmth   Skin:     General: Skin is warm  Capillary Refill: Capillary refill takes less than 2 seconds  Coloration: Skin is not jaundiced or pale  Findings: No bruising, erythema, lesion or rash  Neurological:      Mental Status: He is alert  Cranial Nerves: No cranial nerve deficit  Sensory: No sensory deficit  Motor: No weakness  Comments: Intermittent head jerking noted but no consistent tremor noted at rest   Psychiatric:         Mood and Affect: Mood normal          Additional Data:     Labs:    Results from last 7 days   Lab Units 04/03/22  1224   WBC Thousand/uL 4 96   HEMOGLOBIN g/dL 11 9*   HEMATOCRIT % 40 3   PLATELETS Thousands/uL 201   NEUTROS PCT % 72   LYMPHS PCT % 17   MONOS PCT % 8   EOS PCT % 2     Results from last 7 days   Lab Units 04/07/22  0622 04/04/22  0429 04/03/22  1224   POTASSIUM mmol/L 3 5   < > 3 2*   CHLORIDE mmol/L 94*   < > 94*   CO2 mmol/L 44*   < > >45*   BUN mg/dL 18   < > 12   CREATININE mg/dL 0 87   < > 0 90   CALCIUM mg/dL 9 0   < > 8 3   ALK PHOS U/L  --   --  69   ALT U/L  --   --  24   AST U/L  --   --  17    < > = values in this interval not displayed  * I Have Reviewed All Lab Data Listed Above  * Additional Pertinent Lab Tests Reviewed:  [facility] Admission Reviewed    Imaging:    Imaging Reports Reviewed Today Include:  Chest x-ray  Imaging Personally Reviewed by Myself Includes:  None    Recent Cultures (last 7 days):           Last 24 Hours Medication List:   Current Facility-Administered Medications   Medication Dose Route Frequency Provider Last Rate    acetaminophen  975 mg Oral [facility] Tinnie Clear, PA-C      aluminum-magnesium hydroxide-simethicone  30 mL Oral Q6H PRN Tinnie Clear, PA-C      budesonide-formoterol  2 puff Inhalation BID Tinnie Clear, PA-C      buPROPion  450 mg Oral Daily Tinnie Clear, PA-C      dabigatran etexilate  150 mg Oral BID Tinnie Clear, PA-C  dicyclomine  20 mg Oral 4x Daily (AC & HS) Paticia Sic, PA-C      diphenhydrAMINE  25 mg Intravenous Q6H PRN Paticia Sic, PA-C      docusate sodium  100 mg Oral BID Paticia Sic, PA-C      famotidine  20 mg Oral Daily Paticia Sic, PA-C      furosemide  80 mg Intravenous BID (diuretic) Jorge Luis Olson, DO      ipratropium  0 5 mg Nebulization TID Jorge Luis Olson, DO      levalbuterol  1 25 mg Nebulization TID Jorge Luis Olson, DO      levothyroxine  25 mcg Oral Early Morning Paticia Sic, PA-C      lidocaine  1 patch Topical Daily Silvia Tabares PA-C      metoprolol tartrate  100 mg Oral Q12H [address] Doniphan [place], CRNP      pantoprazole  40 mg Oral Daily Paticia Harris, PA-C      polyethylene glycol  17 g Oral Daily PRN Paticia Sic, PA-C      potassium chloride  40 mEq Oral BID Doniphan [place], CRNP      spironolactone  50 mg Oral Daily Paticideion Iglesias PADaeC          Today, Patient Was Seen By: Ngozi Collier PA-C    ** Please Note: Dictation voice to text software may have been used in the creation of this document   **

## 2022-04-08 LAB
ANION GAP SERPL CALCULATED.3IONS-SCNC: -3 MMOL/L (ref 4–13)
BUN SERPL-MCNC: 16 MG/DL (ref 5–25)
CALCIUM SERPL-MCNC: 8.7 MG/DL (ref 8.3–10.1)
CHLORIDE SERPL-SCNC: 98 MMOL/L (ref 100–108)
CO2 SERPL-SCNC: 44 MMOL/L (ref 21–32)
CREAT SERPL-MCNC: 0.85 MG/DL (ref 0.6–1.3)
GFR SERPL CREATININE-BSD FRML MDRD: 100 ML/MIN/1.73SQ M
GLUCOSE SERPL-MCNC: 115 MG/DL (ref 65–140)
POTASSIUM SERPL-SCNC: 3.7 MMOL/L (ref 3.5–5.3)
SODIUM SERPL-SCNC: 139 MMOL/L (ref 136–145)

## 2022-04-08 PROCEDURE — 99232 SBSQ HOSP IP/OBS MODERATE 35: CPT | Performed by: PHYSICIAN ASSISTANT

## 2022-04-08 PROCEDURE — 80048 BASIC METABOLIC PNL TOTAL CA: CPT | Performed by: PHYSICIAN ASSISTANT

## 2022-04-08 PROCEDURE — 99231 SBSQ HOSP IP/OBS SF/LOW 25: CPT | Performed by: INTERNAL MEDICINE

## 2022-04-08 PROCEDURE — 94660 CPAP INITIATION&MGMT: CPT

## 2022-04-08 PROCEDURE — 94640 AIRWAY INHALATION TREATMENT: CPT

## 2022-04-08 PROCEDURE — 94003 VENT MGMT INPAT SUBQ DAY: CPT

## 2022-04-08 PROCEDURE — 94760 N-INVAS EAR/PLS OXIMETRY 1: CPT

## 2022-04-08 RX ORDER — METOLAZONE 5 MG/1
5 TABLET ORAL ONCE
Status: COMPLETED | OUTPATIENT
Start: 2022-04-08 | End: 2022-04-08

## 2022-04-08 RX ORDER — SACCHAROMYCES BOULARDII 250 MG
250 CAPSULE ORAL 2 TIMES DAILY
Status: DISCONTINUED | OUTPATIENT
Start: 2022-04-08 | End: 2022-04-10 | Stop reason: HOSPADM

## 2022-04-08 RX ADMIN — IPRATROPIUM BROMIDE 0.5 MG: 0.5 SOLUTION RESPIRATORY (INHALATION) at 07:47

## 2022-04-08 RX ADMIN — DICYCLOMINE HYDROCHLORIDE 20 MG: 20 TABLET ORAL at 11:33

## 2022-04-08 RX ADMIN — FAMOTIDINE 20 MG: 20 TABLET, FILM COATED ORAL at 09:33

## 2022-04-08 RX ADMIN — DABIGATRAN ETEXILATE MESYLATE 150 MG: 150 CAPSULE ORAL at 17:25

## 2022-04-08 RX ADMIN — LEVALBUTEROL HYDROCHLORIDE 1.25 MG: 1.25 SOLUTION RESPIRATORY (INHALATION) at 19:44

## 2022-04-08 RX ADMIN — METOPROLOL TARTRATE 100 MG: 100 TABLET, FILM COATED ORAL at 09:33

## 2022-04-08 RX ADMIN — BUPROPION HYDROCHLORIDE 450 MG: 150 TABLET, FILM COATED, EXTENDED RELEASE ORAL at 09:32

## 2022-04-08 RX ADMIN — METOLAZONE 5 MG: 5 TABLET ORAL at 15:45

## 2022-04-08 RX ADMIN — DICYCLOMINE HYDROCHLORIDE 20 MG: 20 TABLET ORAL at 15:46

## 2022-04-08 RX ADMIN — LIDOCAINE 5% 1 PATCH: 700 PATCH TOPICAL at 09:34

## 2022-04-08 RX ADMIN — IPRATROPIUM BROMIDE 0.5 MG: 0.5 SOLUTION RESPIRATORY (INHALATION) at 14:40

## 2022-04-08 RX ADMIN — FUROSEMIDE 80 MG: 10 INJECTION, SOLUTION INTRAMUSCULAR; INTRAVENOUS at 16:13

## 2022-04-08 RX ADMIN — Medication 250 MG: at 13:38

## 2022-04-08 RX ADMIN — LEVOTHYROXINE SODIUM 25 MCG: 25 TABLET ORAL at 05:54

## 2022-04-08 RX ADMIN — LEVALBUTEROL HYDROCHLORIDE 1.25 MG: 1.25 SOLUTION RESPIRATORY (INHALATION) at 14:40

## 2022-04-08 RX ADMIN — SPIRONOLACTONE 50 MG: 25 TABLET ORAL at 09:33

## 2022-04-08 RX ADMIN — DICYCLOMINE HYDROCHLORIDE 20 MG: 20 TABLET ORAL at 05:54

## 2022-04-08 RX ADMIN — LEVALBUTEROL HYDROCHLORIDE 1.25 MG: 1.25 SOLUTION RESPIRATORY (INHALATION) at 07:47

## 2022-04-08 RX ADMIN — METOPROLOL TARTRATE 100 MG: 100 TABLET, FILM COATED ORAL at 21:02

## 2022-04-08 RX ADMIN — PANTOPRAZOLE SODIUM 40 MG: 40 TABLET, DELAYED RELEASE ORAL at 05:54

## 2022-04-08 RX ADMIN — POTASSIUM CHLORIDE 40 MEQ: 1500 TABLET, EXTENDED RELEASE ORAL at 09:33

## 2022-04-08 RX ADMIN — BUDESONIDE AND FORMOTEROL FUMARATE DIHYDRATE 2 PUFF: 160; 4.5 AEROSOL RESPIRATORY (INHALATION) at 09:34

## 2022-04-08 RX ADMIN — Medication 250 MG: at 17:26

## 2022-04-08 RX ADMIN — DICYCLOMINE HYDROCHLORIDE 20 MG: 20 TABLET ORAL at 21:02

## 2022-04-08 RX ADMIN — ACETAMINOPHEN 325MG 975 MG: 325 TABLET ORAL at 05:54

## 2022-04-08 RX ADMIN — DABIGATRAN ETEXILATE MESYLATE 150 MG: 150 CAPSULE ORAL at 09:33

## 2022-04-08 RX ADMIN — BUDESONIDE AND FORMOTEROL FUMARATE DIHYDRATE 2 PUFF: 160; 4.5 AEROSOL RESPIRATORY (INHALATION) at 17:26

## 2022-04-08 RX ADMIN — ACETAMINOPHEN 325MG 975 MG: 325 TABLET ORAL at 21:02

## 2022-04-08 RX ADMIN — FUROSEMIDE 80 MG: 10 INJECTION, SOLUTION INTRAMUSCULAR; INTRAVENOUS at 09:42

## 2022-04-08 RX ADMIN — POTASSIUM CHLORIDE 40 MEQ: 1500 TABLET, EXTENDED RELEASE ORAL at 17:26

## 2022-04-08 RX ADMIN — ACETAMINOPHEN 325MG 975 MG: 325 TABLET ORAL at 13:38

## 2022-04-08 RX ADMIN — IPRATROPIUM BROMIDE 0.5 MG: 0.5 SOLUTION RESPIRATORY (INHALATION) at 19:44

## 2022-04-08 NOTE — APP STUDENT NOTE
Atrial Fibrillation with RVR ([facility] [address] )  -patient has some chest pain today although greatly improved from days prior  -presented 3 days ago with a HR of 130, but today his HR ranges high 90s  -if HR consistently hits 115 start IV Cardizem until HR ranges below 100   -maintain HR and angina with Lopressor 100mg BID  -continue Pradaxa for anticoagulation  -monitor with tele     Acute on Chronic Diastolic Congestive Heart failure  -Home regimen is torsemide 40mg BID  -During hospital stay, on IV lasik 80mg BID, continue  -metalazone 5mg once was given due to slow urine output   -cardiology evaluation  -Continue cardiac diet and 1500ml fluid restriction  -Monitor I/Os and DW     Chronic Respiratory failure with hypoxia  -  Baseline is 2L/min chronic of oxygen  -was weaned off from 4L/min down to 2L/min  -SpO2 is 94%  -continue to monitor respiratory status     Hypothyroidism  -TSH WNL  -continue synthroid     Diabetes Mellitus  -Maintained by diet, A1C is 5 4     Morbid Obesity  -BMI is 73 29kg/m2  -Dietician Consult  -Bariatric sx referral     VTE Pharmacologic Prophylaxis: VTE Score: 7 High Risk (Score >/= 5) - Pharmacological DVT Prophylaxis Ordered: dabigatran (Pradaxa)  Sequential Compression Devices Ordered  Patient Centered Rounds: I performed bedside rounds with nursing staff today  Discussions with Specialists or Other Care Team Provider:     Education and Discussions with Family / Patient: Patient declined call to   Time Spent for Care: 30 minutes  More than 50% of total time spent on counseling and coordination of care as described above  Current Length of Stay: 4 day(s)  Current Patient Status: Inpatient   Certification Statement: The patient will continue to require additional inpatient hospital stay due to Cardiology observation   Discharge Plan: Anticipate discharge in 24-48 hrs to rehab facility      Code Status: Level 1 - Full Code    Subjective:   Patient reports feeling significantly better overall  He claims his chest pain has practically gone away and he no longer feels palpitations  He will still get an occasional twitch, but they have progressively gotten better as well  He claims to have had 5 wattery stools over the past 12hrs  He denies nausea, vomiting, profuse sweating outside of norm, or headaches  Objective:     Vitals:   Temp (24hrs), Av 4 °F (36 9 °C), Min:98 2 °F (36 8 °C), Max:98 5 °F (36 9 °C)    Temp:  [98 2 °F (36 8 °C)-98 5 °F (36 9 °C)] 98 5 °F (36 9 °C)  HR:  [] 105  Resp:  [18] 18  BP: (103-116)/(58-66) 116/60  SpO2:  [89 %-100 %] 100 %  Body mass index is 72 38 kg/m²  Input and Output Summary (last 24 hours): Intake/Output Summary (Last 24 hours) at 2022 0907  Last data filed at 2022 0437  Gross per 24 hour   Intake 480 ml   Output 1100 ml   Net -620 ml       Physical Exam:   Physical Exam  Vitals and nursing note reviewed  Constitutional:       General: He is not in acute distress  Appearance: He is not ill-appearing, toxic-appearing or diaphoretic  Comments: Morbidly obese   HENT:      Head: Normocephalic  Right Ear: External ear normal       Left Ear: External ear normal       Nose: Nose normal  No congestion  Mouth/Throat:      Mouth: Mucous membranes are moist       Pharynx: Oropharynx is clear  Eyes:      Conjunctiva/sclera: Conjunctivae normal       Pupils: Pupils are equal, round, and reactive to light  Cardiovascular:      Rate and Rhythm: Normal rate  Rhythm irregular  Pulses: Normal pulses  Heart sounds: Normal heart sounds  No murmur heard  No friction rub  No gallop  Pulmonary:      Effort: Pulmonary effort is normal  No respiratory distress  Breath sounds: Normal breath sounds  No stridor  No wheezing, rhonchi or rales  Chest:      Chest wall: No tenderness  Abdominal:      General: Bowel sounds are normal       Palpations: Abdomen is soft        Hernia: A hernia is present  Musculoskeletal:         General: Normal range of motion  Skin:     General: Skin is warm and dry  Capillary Refill: Capillary refill takes less than 2 seconds  Findings: No lesion or rash  Neurological:      General: No focal deficit present  Mental Status: He is alert and oriented to person, place, and time  Mental status is at baseline  Psychiatric:         Mood and Affect: Mood normal           Additional Data:     Labs:  Results from last 7 days   Lab Units 04/03/22  1224   WBC Thousand/uL 4 96   HEMOGLOBIN g/dL 11 9*   HEMATOCRIT % 40 3   PLATELETS Thousands/uL 201   NEUTROS PCT % 72   LYMPHS PCT % 17   MONOS PCT % 8   EOS PCT % 2     Results from last 7 days   Lab Units 04/08/22  0438 04/04/22  0429 04/03/22  1224   SODIUM mmol/L 139   < > 144   POTASSIUM mmol/L 3 7   < > 3 2*   CHLORIDE mmol/L 98*   < > 94*   CO2 mmol/L 44*   < > >45*   BUN mg/dL 16   < > 12   CREATININE mg/dL 0 85   < > 0 90   ANION GAP mmol/L -3*   < >  --    CALCIUM mg/dL 8 7   < > 8 3   ALBUMIN g/dL  --   --  2 7*   TOTAL BILIRUBIN mg/dL  --   --  0 48   ALK PHOS U/L  --   --  69   ALT U/L  --   --  24   AST U/L  --   --  17   GLUCOSE RANDOM mg/dL 115   < > 129    < > = values in this interval not displayed  Lines/Drains:  Invasive Devices  Report    Peripheral Intravenous Line            Peripheral IV 04/03/22 Left Antecubital 4 days                  Telemetry:  Telemetry Orders (From admission, onward)             48 Hour Telemetry Monitoring  Continuous x 48 hours        References:    Telemetry Guidelines   Question:  Reason for 48 Hour Telemetry  Answer:  Arrhythmias Requiring Medical Therapy (eg  SVT, Vtach/fib, Bradycardia, Uncontrolled A-fib)                 Telemetry Reviewed: Atrial fibrillation   HR averaging low 100s  Indication for Continued Telemetry Use: Arrthymias requiring medical therapy             Imaging: Reviewed radiology reports from this admission including: chest xray    Recent Cultures (last 7 days):         Last 24 Hours Medication List:   Current Facility-Administered Medications   Medication Dose Route Frequency Provider Last Rate    acetaminophen  975 mg Oral [facility] Estrella Smith, JIMENA      aluminum-magnesium hydroxide-simethicone  30 mL Oral Q6H PRN Estrella Smith, JIMENA      budesonide-formoterol  2 puff Inhalation BID Estrella Smith PA-C      buPROPion  450 mg Oral Daily Estrella UlisJIMENA yu      dabigatran etexilate  150 mg Oral BID Estrella Luis, JIMENA      dicyclomine  20 mg Oral 4x Daily (AC & HS) Estrella Smith PA-C      diphenhydrAMINE  25 mg Intravenous Q6H PRN Estrella Smith PA-C      docusate sodium  100 mg Oral BID Estrella Luis, JIMENA      famotidine  20 mg Oral Daily Estrella Smith PA-C      furosemide  80 mg Intravenous BID (diuretic) Guerrero Rafaela, DO      ipratropium  0 5 mg Nebulization TID Woody Rafaela, DO      levalbuterol  1 25 mg Nebulization TID Woody Rafaela, DO      levothyroxine  25 mcg Oral Early Morning Estrella Smith PA-C      lidocaine  1 patch Topical Daily Silvia Evangelista PA-C      metoprolol tartrate  100 mg Oral Q12H [address] Earnest Christina, CRNP      pantoprazole  40 mg Oral Daily Estrella Smith PA-C      polyethylene glycol  17 g Oral Daily PRN Estrella Smith PA-C      potassium chloride  40 mEq Oral BID Earnest Christina, CRNP      spironolactone  50 mg Oral Daily Estrelal Smith PA-C          Today, Patient Was Seen By: Espinoza Brooks    **Please Note: This note may have been constructed using a voice recognition system  **

## 2022-04-08 NOTE — PROGRESS NOTES
General Cardiology   Progress Note -  [facility] One   Alirio Rogers 47 y o  male MRN: [id number]    Unit/Bed#: S -01 Encounter: [id number]    Assessment  1  Acute on chronic hypoxic and hypercapnic respiratory failure  Multifactorial in setting of acute diastolic CHF and morbid obesity with OHS  Currently 95-96% on 4LNC, typically on 2LNC  2  Acute on chronic diastolic CHF  Presented with weight gain, shortness of breath, LE edema  NT proBNP 1100 (up from 700)  Home diuretic: torsemide 40 mg BID, spironolactone 50 mg daily  Dry weight:  Recently 519 lb per [employer] lift  Current weight: 518 lb today  I/O: limited output yesterday (only 1 L UOP) so likely inaccurate  Already has 3L out today  Creatinine stable, K 3 7  Echo limited by body habitus but LV function estimated at 55%  3  Permanent atrial fibrillation, presenting with RVR  Home AVN blockers: metoprolol tartrate 50 mg BID  OAC: Xarelto  Rates 90s-110 per tele review  4  Hypertension- controlled, average /62  5   History of DVT s/p IVC filter- on Xarelto  6  Morbid obesity with obesity hypoventilation syndrome- BMI 72 38  7   FILIPPO- compliant with CPAP at HS     Plan  1  Continue Lasix 80 mg IV BID and spironolactone for net negative fluid balance  Give another 5 mg metolazone prior to PM dose of Lasix today  2  Continue increased metoprolol dose- 100 mg BID  Rates acceptable, 80s-100s  3  Continue anticoagulation with Xarelto  4  Strict I/Os, daily bed weights  5   2 gm Na diet with 1500 mL FR  6  AM BMP    Subjective  Review of Systems   Constitutional: Negative for chills, malaise/fatigue and weight gain  Cardiovascular: Negative for chest pain, dyspnea on exertion, leg swelling, orthopnea, palpitations and syncope  Respiratory: Negative for cough, shortness of breath, sleep disturbances due to breathing and sputum production  Gastrointestinal: Negative for bloating and nausea     Neurological: Negative for dizziness, light-headedness and weakness  Psychiatric/Behavioral: Negative for altered mental status  All other systems reviewed and are negative  Objective:   Vitals: Blood pressure 116/60, pulse 105, temperature 98 5 °F (36 9 °C), temperature source Oral, resp  rate 18, height 5' 11" (1 803 m), weight (!) 235 kg (518 lb 15 4 oz), SpO2 95 %  , Body mass index is 72 38 kg/m²  ,     Systolic (67WJR), DRR:446 , Min:103 , HE     Diastolic (71EUU), GXV:19, Min:59, Max:66      Intake/Output Summary (Last 24 hours) at 2022 1532  Last data filed at 2022 1345  Gross per 24 hour   Intake 930 ml   Output 4375 ml   Net -3445 ml     Wt Readings from Last 3 Encounters:   22 (!) 235 kg (518 lb 15 4 oz)   22 (!) 256 kg (564 lb 9 6 oz)   22 (!) 242 kg (534 lb)     Telemetry Review: Afib, rates 80s-100s    Physical Exam  Vitals reviewed  Constitutional:       General: He is not in acute distress  Appearance: He is obese  Neck:      Vascular: No hepatojugular reflux or JVD  Cardiovascular:      Rate and Rhythm: Normal rate  Rhythm irregularly irregular  Heart sounds: Heart sounds are distant  No murmur heard  No friction rub  No gallop  Pulmonary:      Effort: Pulmonary effort is normal  No respiratory distress  Breath sounds: No rales  Abdominal:      General: Bowel sounds are normal  There is no distension  Palpations: Abdomen is soft  Tenderness: There is no abdominal tenderness  Musculoskeletal:         General: No tenderness  Normal range of motion  Cervical back: Neck supple  Right lower leg: Edema present  Left lower leg: Edema present  Skin:     General: Skin is warm and dry  Findings: No erythema  Neurological:      Mental Status: He is alert and oriented to person, place, and time     Psychiatric:         Mood and Affect: Mood normal      LABORATORY RESULTS      CBC with diff:   Results from last 7 days   Lab Units 22  1224   WBC Thousand/uL 4 96 HEMOGLOBIN g/dL 11 9*   HEMATOCRIT % 40 3   MCV fL 98   PLATELETS Thousands/uL 201   MCH pg 28 9   MCHC g/dL 29 5*   RDW % 14 6   MPV fL 10 1   NRBC AUTO /100 WBCs 0     CMP:  Results from last 7 days   Lab Units 04/08/22 0438 04/07/22  0622 04/06/22  0556 04/05/22  0554 04/04/22  0429 04/03/22  1224   POTASSIUM mmol/L 3 7 3 5 3 7 3 6 3 8 3 2*   CHLORIDE mmol/L 98* 94* 92* 95* 94* 94*   CO2 mmol/L 44* 44* >45* 44* >45* >45*   BUN mg/dL 16 18 14 15 12 12   CREATININE mg/dL 0 85 0 87 0 87 0 85 0 89 0 90   CALCIUM mg/dL 8 7 9 0 8 8 8 8 8 8 8 3   AST U/L  --   --   --   --   --  17   ALT U/L  --   --   --   --   --  24   ALK PHOS U/L  --   --   --   --   --  69   EGFR ml/min/1 73sq m 100 99 99 100 98 98     BMP:  Results from last 7 days   Lab Units 04/08/22 0438 04/07/22 0622 04/06/22  0556 04/05/22  0554 04/04/22  0429 04/03/22  1224   POTASSIUM mmol/L 3 7 3 5 3 7 3 6 3 8 3 2*   CHLORIDE mmol/L 98* 94* 92* 95* 94* 94*   CO2 mmol/L 44* 44* >45* 44* >45* >45*   BUN mg/dL 16 18 14 15 12 12   CREATININE mg/dL 0 85 0 87 0 87 0 85 0 89 0 90   CALCIUM mg/dL 8 7 9 0 8 8 8 8 8 8 8 3       Lab Results   Component Value Date    CREATININE 0 85 04/08/2022    CREATININE 0 87 04/07/2022    CREATININE 0 87 04/06/2022       Lab Results   Component Value Date    NTBNP 1,121 (H) 04/03/2022    NTBNP 752 (H) 03/13/2022    NTBNP 607 (H) 02/18/2022      Results from last 7 days   Lab Units 04/06/22  0556 04/05/22  0554 04/04/22 0429 04/03/22  1224   MAGNESIUM mg/dL 2 1 1 9 1 9 1 7      Results from last 7 days   Lab Units 04/03/22  1224   TSH 3RD GENERATON uIU/mL 1 982     Lipid Profile:   No results found for: CHOL  Lab Results   Component Value Date    HDL 44 08/16/2016     Lab Results   Component Value Date    LDLCALC 80 08/16/2016     Lab Results   Component Value Date    TRIG 197 (H) 08/16/2016     Cardiac testing:   Results for orders placed during the hospital encounter of 05/10/21    Echo complete with contrast if indicated    Narrative  [facility] [address]    Transthoracic Echocardiogram  2D, M-mode, Doppler, and Color Doppler    Study date:  11-May-2021    Patient: Ene Burton  MR number: [id number]  Account number: [de-identified]  : 1970  Age: 49 years  Gender: Male  Status: Outpatient  Location: [facility]  Height: 70 in  Weight: 409 2 lb  BP: 121/ 53 mmHg    Indications: Heart failure    Diagnoses: I50 9 - Heart failure, unspecified    Sonographer:  JOSE Barrow  Referring Physician:  Seven Hinton MD  Group:  [facility]  Interpreting Physician:  Elyse Calvillo MD    SUMMARY    PROCEDURE INFORMATION:  This was a technically difficult study  LEFT VENTRICLE:  Systolic function was normal  Ejection fraction was estimated to be 60 %  This study was inadequate for the evaluation of regional wall motion  HISTORY: PRIOR HISTORY: afib, CHF, morbid obesity, DVT, COPD, DM, hypothyroid, MRSA    PROCEDURE: The study was performed in the [facility]  This was a routine study  The transthoracic approach was used  The study included complete 2D imaging, M-mode, complete spectral Doppler, and color Doppler  The heart rate was 86  bpm, at the start of the study  Images were obtained from the parasternal, apical, subcostal, and suprasternal notch acoustic windows  Echocardiographic views were limited due to restricted patient mobility, poor acoustic window  availability, decreased penetration, and lung interference  This was a technically difficult study  LEFT VENTRICLE: Size was normal  Systolic function was normal  Ejection fraction was estimated to be 60 %  This study was inadequate for the evaluation of regional wall motion  Wall thickness could not be accurately determined  DOPPLER:  The study was not technically sufficient to allow evaluation of LV diastolic function      RIGHT VENTRICLE: Systolic function was normal  Not well visualized  LEFT ATRIUM: Not well visualized  RIGHT ATRIUM: Not well visualized  MITRAL VALVE: Not well visualized  DOPPLER: The transmitral velocity was within the normal range  There was trace regurgitation  AORTIC VALVE: The valve was probably trileaflet  The valve was not well visualized  DOPPLER: Transaortic velocity was within the normal range  There was no evidence for stenosis  TRICUSPID VALVE: Not well visualized  DOPPLER: The transtricuspid velocity was within the normal range  There was trace regurgitation  Pulmonary artery systolic pressure was within the normal range  Estimated peak PA pressure was 27 mmHg  PULMONIC VALVE: Not well visualized  DOPPLER: The transpulmonic velocity was within the normal range  There was trace regurgitation  PERICARDIUM: Not well visualized  AORTA: Not well visualized      SYSTEM MEASUREMENT TABLES    2D  Ao Diam: 3 11 cm  LA Diam: 4 34 cm    CW  AV Vmax: 1 22 m/s  AV maxP 91 mmHg  PV Vmax: 1 13 m/s  PV maxP 13 mmHg  TR Vmax: 2 72 m/s  TR maxP 6 mmHg    [facility]    Prepared and electronically signed by    Alexandra Reddy MD  Signed 11-May-2021 11:37:20    Meds/Allergies   all current active meds have been reviewed and current meds:   Current Facility-Administered Medications   Medication Dose Route Frequency    acetaminophen (TYLENOL) tablet 975 mg  975 mg Oral Q8H [address]    aluminum-magnesium hydroxide-simethicone (MYLANTA) oral suspension 30 mL  30 mL Oral Q6H PRN    budesonide-formoterol (SYMBICORT) 160-4 5 mcg/act inhaler 2 puff  2 puff Inhalation BID    buPROPion (WELLBUTRIN XL) 24 hr tablet 450 mg  450 mg Oral Daily    dabigatran etexilate (PRADAXA) capsule 150 mg  150 mg Oral BID    dicyclomine (BENTYL) tablet 20 mg  20 mg Oral 4x Daily (AC & HS)    diphenhydrAMINE (BENADRYL) injection 25 mg  25 mg Intravenous Q6H PRN    famotidine (PEPCID) tablet 20 mg 20 mg Oral Daily    furosemide (LASIX) injection 80 mg  80 mg Intravenous BID (diuretic)    ipratropium (ATROVENT) 0 02 % inhalation solution 0 5 mg  0 5 mg Nebulization TID    levalbuterol (XOPENEX) inhalation solution 1 25 mg  1 25 mg Nebulization TID    levothyroxine tablet 25 mcg  25 mcg Oral Early Morning    lidocaine (LIDODERM) 5 % patch 1 patch  1 patch Topical Daily    metolazone (ZAROXOLYN) tablet 5 mg  5 mg Oral Once    metoprolol tartrate (LOPRESSOR) tablet 100 mg  100 mg Oral Q12H [facility]    pantoprazole (PROTONIX) EC tablet 40 mg  40 mg Oral Daily    potassium chloride (K-DUR,KLOR-CON) CR tablet 40 mEq  40 mEq Oral BID    saccharomyces boulardii (FLORASTOR) capsule 250 mg  250 mg Oral BID    spironolactone (ALDACTONE) tablet 50 mg  50 mg Oral Daily     Medications Prior to Admission   Medication    acetaminophen (TYLENOL) 500 mg tablet    albuterol (2 5 mg/3 mL) 0 083 % nebulizer solution    aluminum-magnesium hydroxide-simethicone (MYLANTA) 200-200-20 mg/5 mL suspension    ammonium lactate (LAC-HYDRIN) 12 % cream    budesonide-formoterol (SYMBICORT) 160-4 5 mcg/act inhaler    buPROPion (FORFIVO XL) 450 MG 24 hr tablet    Cholecalciferol (VITAMIN D) 50 MCG (2000 UT) tablet    cyanocobalamin (VITAMIN B-12) 1000 MCG tablet    dabigatran etexilate (PRADAXA) 150 mg capsu    dicyclomine (BENTYL) 20 mg tablet    digoxin (LANOXIN) 0 125 mg tablet    docusate sodium (COLACE) 100 mg capsule    famotidine (PEPCID) 20 mg tablet    gabapentin (NEURONTIN) 300 mg capsule    hydrOXYzine pamoate (VISTARIL) 25 mg capsule    ipratropium-albuterol (DUO-NEB) 0 5-2 5 mg/3 mL nebulizer solution    levothyroxine 25 mcg tablet    metoclopramide (REGLAN) 10 mg tablet    metoprolol tartrate (LOPRESSOR) 50 mg tablet    nystatin (MYCOSTATIN) cream    ondansetron (ZOFRAN) 4 mg tablet    pantoprazole (PROTONIX) 20 mg tablet    polyethylene glycol (MIRALAX) 17 g packet    Potassium Chloride (KCL-20 PO)  spironolactone (ALDACTONE) 50 mg tablet    torsemide 40 MG TABS    sodium chloride (OCEAN) 0 65 % nasal spray     Counseling / Coordination of Care  Total floor / unit time spent today 20 minutes  Greater than 50% of total time was spent with the patient and / or family counseling and / or coordination of care  ** Please Note: Dragon 360 Dictation voice to text software may have been used in the creation of this document   **

## 2022-04-08 NOTE — PLAN OF CARE
Problem: Potential for Falls  Goal: Patient will remain free of falls  Description: INTERVENTIONS:  - Educate patient/family on patient safety including physical limitations  - Instruct patient to call for assistance with activity   - Consult OT/PT to assist with strengthening/mobility   - Keep Call bell within reach  - Keep bed low and locked with side rails adjusted as appropriate  - Keep care items and personal belongings within reach  - Initiate and maintain comfort rounds  - Make Fall Risk Sign visible to staff  - Apply yellow socks and bracelet for high fall risk patients  - Consider moving patient to room near nurses station  Outcome: Progressing     Problem: MOBILITY - ADULT  Goal: Maintain or return to baseline ADL function  Description: INTERVENTIONS:  -  Assess patient's ability to carry out ADLs; assess patient's baseline for ADL function and identify physical deficits which impact ability to perform ADLs (bathing, care of mouth/teeth, toileting, grooming, dressing, etc )  - Assess/evaluate cause of self-care deficits   - Assess range of motion  - Assess patient's mobility; develop plan if impaired  - Assess patient's need for assistive devices and provide as appropriate  - Encourage maximum independence but intervene and supervise when necessary  - Involve family in performance of ADLs  - Assess for home care needs following discharge   - Consider OT consult to assist with ADL evaluation and planning for discharge  - Provide patient education as appropriate  Outcome: Progressing  Goal: Maintains/Returns to pre admission functional level  Description: INTERVENTIONS:  - Perform BMAT or MOVE assessment daily    - Set and communicate daily mobility goal to care team and patient/family/caregiver     - Collaborate with rehabilitation services on mobility goals if consulted  - Record patient progress and toleration of activity level   Outcome: Progressing     Problem: Prexisting or High Potential for Compromised Skin Integrity  Goal: Skin integrity is maintained or improved  Description: INTERVENTIONS:  - Identify patients at risk for skin breakdown  - Assess and monitor skin integrity  - Assess and monitor nutrition and hydration status  - Monitor labs   - Assess for incontinence   - Turn and reposition patient  - Assist with mobility/ambulation  - Relieve pressure over bony prominences  - Avoid friction and shearing  - Provide appropriate hygiene as needed including keeping skin clean and dry  - Evaluate need for skin moisturizer/barrier cream  - Collaborate with interdisciplinary team   - Patient/family teaching  - Consider wound care consult   Outcome: Progressing     Problem: Nutrition/Hydration-ADULT  Goal: Nutrient/Hydration intake appropriate for improving, restoring or maintaining nutritional needs  Description: Monitor and assess patient's nutrition/hydration status for malnutrition  Collaborate with interdisciplinary team and initiate plan and interventions as ordered  Monitor patient's weight and dietary intake as ordered or per policy  Utilize nutrition screening tool and intervene as necessary  Determine patient's food preferences and provide high-protein, high-caloric foods as appropriate       INTERVENTIONS:  - Monitor oral intake, urinary output, labs, and treatment plans  - Assess nutrition and hydration status and recommend course of action  - Evaluate amount of meals eaten  - Assist patient with eating if necessary   - Allow adequate time for meals  - Recommend/ encourage appropriate diets, oral nutritional supplements, and vitamin/mineral supplements  - Order, calculate, and assess calorie counts as needed  - Recommend, monitor, and adjust tube feedings and TPN/PPN based on assessed needs  - Assess need for intravenous fluids  - Provide specific nutrition/hydration education as appropriate  - Include patient/family/caregiver in decisions related to nutrition  Outcome: Progressing

## 2022-04-08 NOTE — PROGRESS NOTES
[facility]  Progress Note - Serina Caraballo 1970, 47 y o  male MRN: [id number]  Unit/Bed#: S -01 Encounter: [id number]  Primary Care Provider: Lan Edwards MD   Date and time admitted to hospital: 4/3/2022 11:42 AM    * Atrial fibrillation with RVR ([address] )  Assessment & Plan  · Patient presents with chest pain and shortness of breath related to AFib RVR  Presented with heart rate of 130  · Was briefly on IV Cardizem infusion upon admission, now discontinued  · Continue Lopressor 100 mg b i d ,  Rates improved and BP tolerating  · No longer on digoxin  · Continue Pradaxa for anticoagulation  · Cardiology following  · Monitor rate control on telemetry  · Heart rate averaging low 90s    Acute on chronic diastolic congestive heart failure [facility])  Assessment & Plan  Wt Readings from Last 3 Encounters:   04/07/22 (!) 237 kg (522 lb 7 8 oz)   03/17/22 (!) 256 kg (564 lb 9 6 oz)   02/22/22 (!) 242 kg (534 lb)     · Very difficult to evaluate volume status however has been compliant with torsemide  · Home regimen is torsemide 40 mg b i d  Darletta Pac · Was started on IV Lasix 80 mg b i d  And spironolactone  · Weight today 518  With dry weight of 519 and Urine output slowing by 4/7 so cardiology started metolazone prior to p m  Lasix dose  · Monitor for improvement of output, continue to monitor daily weight  · Cardiology following  · Monitor BMP    Chronic respiratory failure with hypoxia (HCC)  Assessment & Plan  · Wears 2 liters/minute chronically, weaned from 4L to 3L NC today  · Monitor respiratory status  Hypothyroidism  Assessment & Plan  · TSH within normal limits  · Continue Synthroid    Knee pain  Assessment & Plan  · Discussed discontinuing IV morphine with patient, patient agreeable  · Will trial Tylenol, Lidoderm patches    Obesity hypoventilation syndrome/FILIPPO  Assessment & Plan  · Continue CPAP q h s      Diabetes mellitus [facility])  Assessment & Plan  Lab Results   Component Value Date    HGBA1C 5 4 2022       No results for input(s): POCGLU in the last 72 hours  Blood Sugar Average: Last 72 hrs:  · A1c 5 4   · Not maintained on anti-hyperglycemic medications  · ADA diet    Tremor  Assessment & Plan  · Noted history of tremors, worsening while hospitalized here  · Stopped gabapentin  · This has been ongoing however he is very bothered by this  · Trial Benadryl, can also consider Cogentin  · Monitor for improvement as hypercapnea improves    Morbid obesity (HCC)  Assessment & Plan  · Body mass index is 72 87 kg/m²  · Outpatient bariatric surgery referral has been placed during a prior admission  · Dietary modifications        VTE Pharmacologic Prophylaxis: VTE Score: 7 High Risk (Score >/= 5) - Pharmacological DVT Prophylaxis Ordered: dabigatran (Pradaxa)  Sequential Compression Devices Ordered  Patient Centered Rounds: I performed bedside rounds with nursing staff today  Discussions with Specialists or Other Care Team Provider: CM, nursing    Education and Discussions with Family / Patient: Patient declined call to   Time Spent for Care: 30 minutes  More than 50% of total time spent on counseling and coordination of care as described above  Current Length of Stay: 4 day(s)  Current Patient Status: Inpatient   Certification Statement: The patient will continue to require additional inpatient hospital stay due to IV diuresis for CHF  Discharge Plan: Anticipate discharge in 24-48 hrs to prior assisted or independent living facility  Code Status: Level 1 - Full Code    Subjective:   Patient reports his breathing is improved in nearing baseline  No chest pain this morning  Tremors improving mildly  Appetite is good  No nausea or vomiting      Objective:     Vitals:   Temp (24hrs), Av 4 °F (36 9 °C), Min:98 2 °F (36 8 °C), Max:98 5 °F (36 9 °C)    Temp:  [98 2 °F (36 8 °C)-98 5 °F (36 9 °C)] 98 5 °F (36 9 °C)  HR:  [] 105  Resp:  [18] 18  BP: (103-116)/(58-66) 116/60  SpO2:  [89 %-100 %] 100 %  Body mass index is 72 38 kg/m²  Input and Output Summary (last 24 hours): Intake/Output Summary (Last 24 hours) at 4/8/2022 0838  Last data filed at 4/8/2022 0437  Gross per 24 hour   Intake 480 ml   Output 1100 ml   Net -620 ml       Physical Exam:   Physical Exam  Vitals and nursing note reviewed  Constitutional:       General: He is not in acute distress  Appearance: Normal appearance  He is obese  Interventions: Nasal cannula in place  HENT:      Head: Normocephalic  Mouth/Throat:      Mouth: Mucous membranes are moist    Eyes:      Pupils: Pupils are equal, round, and reactive to light  Cardiovascular:      Rate and Rhythm: Normal rate and regular rhythm  Heart sounds: No murmur heard  Pulmonary:      Effort: Pulmonary effort is normal  No respiratory distress  Breath sounds: Normal breath sounds  No wheezing, rhonchi or rales  Abdominal:      General: Bowel sounds are normal  There is no distension  Palpations: Abdomen is soft  Tenderness: There is no abdominal tenderness  There is no guarding  Musculoskeletal:         General: No deformity  Cervical back: Normal range of motion  Right lower leg: Edema present  Left lower leg: Edema present  Skin:     Capillary Refill: Capillary refill takes less than 2 seconds  Neurological:      General: No focal deficit present  Mental Status: He is alert and oriented to person, place, and time  Mental status is at baseline           Additional Data:     Labs:  Results from last 7 days   Lab Units 04/03/22  1224   WBC Thousand/uL 4 96   HEMOGLOBIN g/dL 11 9*   HEMATOCRIT % 40 3   PLATELETS Thousands/uL 201   NEUTROS PCT % 72   LYMPHS PCT % 17   MONOS PCT % 8   EOS PCT % 2     Results from last 7 days   Lab Units 04/08/22  0438 04/04/22  0429 04/03/22  1224   SODIUM mmol/L 139   < > 144   POTASSIUM mmol/L 3 7   < > 3 2*   CHLORIDE mmol/L 98*   < > 94*   CO2 mmol/L 44*   < > >45*   BUN mg/dL 16   < > 12   CREATININE mg/dL 0 85   < > 0 90   ANION GAP mmol/L -3*   < >  --    CALCIUM mg/dL 8 7   < > 8 3   ALBUMIN g/dL  --   --  2 7*   TOTAL BILIRUBIN mg/dL  --   --  0 48   ALK PHOS U/L  --   --  69   ALT U/L  --   --  24   AST U/L  --   --  17   GLUCOSE RANDOM mg/dL 115   < > 129    < > = values in this interval not displayed  Lines/Drains:  Invasive Devices  Report    Peripheral Intravenous Line            Peripheral IV 04/03/22 Left Antecubital 4 days                  Telemetry:  Telemetry Orders (From admission, onward)             48 Hour Telemetry Monitoring  Continuous x 48 hours        References:    Telemetry Guidelines   Question:  Reason for 48 Hour Telemetry  Answer:  Arrhythmias Requiring Medical Therapy (eg  SVT, Vtach/fib, Bradycardia, Uncontrolled A-fib)                 Telemetry Reviewed: Atrial fibrillation  HR averaging 90  Indication for Continued Telemetry Use: Arrthymias requiring medical therapy             Imaging: No pertinent imaging reviewed      Recent Cultures (last 7 days):         Last 24 Hours Medication List:   Current Facility-Administered Medications   Medication Dose Route Frequency Provider Last Rate    acetaminophen  975 mg Oral [facility] Radha Carpenter, PA-C      aluminum-magnesium hydroxide-simethicone  30 mL Oral Q6H PRN Radha Carpenter, PA-C      budesonide-formoterol  2 puff Inhalation BID Radha Carpenter, PA-C      buPROPion  450 mg Oral Daily Radha Carpenter, PA-C      dabigatran etexilate  150 mg Oral BID Radha Carpenter, PA-C      dicyclomine  20 mg Oral 4x Daily [facility] Radha Deysen, PA-C      diphenhydrAMINE  25 mg Intravenous Q6H PRN Radha Carpenter, PA-C      docusate sodium  100 mg Oral BID Radha Carpenter, PA-C      famotidine  20 mg Oral Daily Radha Carpenter, PA-C      furosemide  80 mg Intravenous BID (diuretic) Daljit Camilo DO      ipratropium  0 5 mg Nebulization TID Vy Maxcy, DO      levalbuterol  1 25 mg Nebulization TID Vy Dahiana, DO      levothyroxine  25 mcg Oral Early Morning Gagan Victoria PA-C      lidocaine  1 patch Topical Daily Jens [place] PA-C      metoprolol tartrate  100 mg Oral Q12H [facility] Thalia, RADHA      pantoprazole  40 mg Oral Daily Gagan Victoria PA-C      polyethylene glycol  17 g Oral Daily PRN Gagan Victoria PA-C      potassium chloride  40 mEq Oral BID [place], RADHA      spironolactone  50 mg Oral Daily Gagan Victoria PA-C          Today, Patient Was Seen By: Bob Leach PA-C    **Please Note: This note may have been constructed using a voice recognition system  **

## 2022-04-08 NOTE — CASE MANAGEMENT
Case Management Assessment & Discharge Planning Note    Patient name Candelaria Bell  Location S /S -01 MRN [id number]  : 1970 Date 2022       Current Admission Date: 4/3/2022  Current Admission Diagnosis:Atrial fibrillation with RVR [facility])   Patient Active Problem List    Diagnosis Date Noted    Intractable abdominal pain 2022    Epidermoid cyst of neck 2022    Hypotension 2021    Chronic respiratory failure with hypoxia ([address] ) 2021    Cellulitis of multiple sites of head and neck 2021    Ventral hernia without obstruction or gangrene 2021    Elevated troponin 2021    History of DVT (deep vein thrombosis) 2021    Positive blood culture 2021    QT prolongation 2021    COPD (chronic obstructive pulmonary disease) ([facility] ) 05/10/2021    Hypothyroidism 05/10/2021    Medical non-compliance 2017    Atrial fibrillation with RVR ([facility] ) 2017    Foot pain 11/15/2016    Knee pain 11/15/2016    Ingrown toenail without infection 2016    Recurrent bronchospasm 10/16/2016    Venous stasis dermatitis of both lower extremities 10/16/2016    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[facility] ) 10/14/2016    Obesity hypoventilation syndrome/FILIPPO 2016    Acute on chronic diastolic congestive heart failure ([facility] ) 2016    MRSA (methicillin resistant Staphylococcus aureus) Tracheobronchitis 2016    Shortness of breath 08/15/2016    Morbid obesity ([address] ) 08/15/2016    Tremor 08/15/2016    Diabetes mellitus ([facility] ) 08/15/2016      LOS (days): 4  Geometric Mean LOS (GMLOS) (days):   Days to GMLOS:     OBJECTIVE:  PATIENT READMITTED TO HOSPITAL  Risk of Unplanned Readmission Score: 28         Current admission status: Inpatient       Preferred Pharmacy:   [id number] [address]  [place]   [address] [address], [address] [place] [address]  Phone: [phone] Fax: [phone]    Primary Care Provider: Jersey Alejandra MD    Primary Insurance: [id number]  Secondary Insurance:     ASSESSMENT:  Active Health Care Proxies    There are no active Health Care Proxies on file  Patient Information  Admitted from[de-identified] Facility [facility])  Mental Status: Alert  During Assessment patient was accompanied by: Not accompanied during assessment  Assessment information provided by[de-identified] Patient  Primary Caregiver: Other (Comment) (facility staff)  Support Systems: Family members,Spouse/significant other  Type of Current Residence: Facility ([address] [place] [address])  Living Arrangements: Other ([place]) ([facility])    Activities of Daily Living Prior to Admission  Functional Status: Total dependent  Completes ADLs independently?: No  Level of ADL dependence: Total Dependent  Ambulates independently?: No  Level of ambulatory dependence:  Total Dependent         Patient Information Continued  Does patient have prescription coverage?: Yes  Within the past 12 months, you worried that your food would run out before you got the money to buy more : Never true  Within the past 12 months, the food you bought just didnt last and you didnt have money to get more : Never true  Food insecurity resource given?: N/A  Does patient receive dialysis treatments?: No         Means of Transportation  Means of Transport to Hospitals in [place][de-identified] Other (Comment) (ambulance)  Was application for public transport provided?: N/A        DISCHARGE DETAILS:    Discharge planning discussed with[de-identified] patient at bedside  [place] of Choice: Yes (re: SNF)  Comments - [place] of Choice: requesting to return to [address] [place] [address] where he is a long-term resident  CM contacted family/caregiver?: No- see comments (patient on the phone with his fiance; reports he will keep in touch with her on his own)                            Other Referral/Resources/Interventions Provided:  Interventions: SNF  Referral Comments: Patient admitted due to afib with RVR; cards following and patient getting IV diuretics  Patient is a long-term resident at [facility]; facility confirmed he's able to return at discharge  If patient is ready over the weekend, requesting supervisor, Giuliana, by contacted ()  Patient is fully vaccinated (per the [facility]); COVID test not needed  Met with patient who confirmed plan to return to the [facility] at discharge  Will need ambulance transport - medical necessity completed and placed on chart in anticipation of weekend d/c      Would you like to participate in our [address] service program?  : No - Declined    Treatment Team Recommendation: SNF  Discharge Destination Plan[de-identified] SNF ([facility] [place])  Transport at Discharge : [address]

## 2022-04-09 LAB
ANION GAP SERPL CALCULATED.3IONS-SCNC: 5 MMOL/L (ref 4–13)
BUN SERPL-MCNC: 19 MG/DL (ref 5–25)
CALCIUM SERPL-MCNC: 9.4 MG/DL (ref 8.3–10.1)
CHLORIDE SERPL-SCNC: 92 MMOL/L (ref 100–108)
CO2 SERPL-SCNC: 42 MMOL/L (ref 21–32)
CREAT SERPL-MCNC: 1.07 MG/DL (ref 0.6–1.3)
GFR SERPL CREATININE-BSD FRML MDRD: 79 ML/MIN/1.73SQ M
GLUCOSE SERPL-MCNC: 127 MG/DL (ref 65–140)
POTASSIUM SERPL-SCNC: 3.7 MMOL/L (ref 3.5–5.3)
SODIUM SERPL-SCNC: 139 MMOL/L (ref 136–145)

## 2022-04-09 PROCEDURE — 94640 AIRWAY INHALATION TREATMENT: CPT

## 2022-04-09 PROCEDURE — 94664 DEMO&/EVAL PT USE INHALER: CPT

## 2022-04-09 PROCEDURE — 99231 SBSQ HOSP IP/OBS SF/LOW 25: CPT | Performed by: INTERNAL MEDICINE

## 2022-04-09 PROCEDURE — 94760 N-INVAS EAR/PLS OXIMETRY 1: CPT

## 2022-04-09 PROCEDURE — 99232 SBSQ HOSP IP/OBS MODERATE 35: CPT | Performed by: PHYSICIAN ASSISTANT

## 2022-04-09 PROCEDURE — 80048 BASIC METABOLIC PNL TOTAL CA: CPT | Performed by: PHYSICIAN ASSISTANT

## 2022-04-09 RX ORDER — TORSEMIDE 20 MG/1
40 TABLET ORAL
Status: DISCONTINUED | OUTPATIENT
Start: 2022-04-10 | End: 2022-04-10 | Stop reason: HOSPADM

## 2022-04-09 RX ADMIN — LEVALBUTEROL HYDROCHLORIDE 1.25 MG: 1.25 SOLUTION RESPIRATORY (INHALATION) at 07:21

## 2022-04-09 RX ADMIN — DABIGATRAN ETEXILATE MESYLATE 150 MG: 150 CAPSULE ORAL at 17:24

## 2022-04-09 RX ADMIN — METOPROLOL TARTRATE 100 MG: 100 TABLET, FILM COATED ORAL at 09:59

## 2022-04-09 RX ADMIN — LEVALBUTEROL HYDROCHLORIDE 1.25 MG: 1.25 SOLUTION RESPIRATORY (INHALATION) at 13:07

## 2022-04-09 RX ADMIN — DICYCLOMINE HYDROCHLORIDE 20 MG: 20 TABLET ORAL at 12:01

## 2022-04-09 RX ADMIN — DICYCLOMINE HYDROCHLORIDE 20 MG: 20 TABLET ORAL at 17:26

## 2022-04-09 RX ADMIN — LIDOCAINE 5% 1 PATCH: 700 PATCH TOPICAL at 09:59

## 2022-04-09 RX ADMIN — Medication 250 MG: at 17:24

## 2022-04-09 RX ADMIN — BUPROPION HYDROCHLORIDE 450 MG: 150 TABLET, FILM COATED, EXTENDED RELEASE ORAL at 09:59

## 2022-04-09 RX ADMIN — ACETAMINOPHEN 325MG 975 MG: 325 TABLET ORAL at 05:33

## 2022-04-09 RX ADMIN — SPIRONOLACTONE 50 MG: 25 TABLET ORAL at 09:59

## 2022-04-09 RX ADMIN — DABIGATRAN ETEXILATE MESYLATE 150 MG: 150 CAPSULE ORAL at 09:59

## 2022-04-09 RX ADMIN — DICYCLOMINE HYDROCHLORIDE 20 MG: 20 TABLET ORAL at 21:10

## 2022-04-09 RX ADMIN — BUDESONIDE AND FORMOTEROL FUMARATE DIHYDRATE 2 PUFF: 160; 4.5 AEROSOL RESPIRATORY (INHALATION) at 17:26

## 2022-04-09 RX ADMIN — IPRATROPIUM BROMIDE 0.5 MG: 0.5 SOLUTION RESPIRATORY (INHALATION) at 13:07

## 2022-04-09 RX ADMIN — POTASSIUM CHLORIDE 40 MEQ: 1500 TABLET, EXTENDED RELEASE ORAL at 17:24

## 2022-04-09 RX ADMIN — Medication 250 MG: at 09:59

## 2022-04-09 RX ADMIN — FAMOTIDINE 20 MG: 20 TABLET, FILM COATED ORAL at 09:59

## 2022-04-09 RX ADMIN — POTASSIUM CHLORIDE 40 MEQ: 1500 TABLET, EXTENDED RELEASE ORAL at 09:59

## 2022-04-09 RX ADMIN — ACETAMINOPHEN 325MG 975 MG: 325 TABLET ORAL at 21:10

## 2022-04-09 RX ADMIN — DICYCLOMINE HYDROCHLORIDE 20 MG: 20 TABLET ORAL at 05:33

## 2022-04-09 RX ADMIN — IPRATROPIUM BROMIDE 0.5 MG: 0.5 SOLUTION RESPIRATORY (INHALATION) at 07:21

## 2022-04-09 RX ADMIN — LEVOTHYROXINE SODIUM 25 MCG: 25 TABLET ORAL at 05:33

## 2022-04-09 RX ADMIN — IPRATROPIUM BROMIDE 0.5 MG: 0.5 SOLUTION RESPIRATORY (INHALATION) at 19:56

## 2022-04-09 RX ADMIN — BUDESONIDE AND FORMOTEROL FUMARATE DIHYDRATE 2 PUFF: 160; 4.5 AEROSOL RESPIRATORY (INHALATION) at 09:59

## 2022-04-09 RX ADMIN — PANTOPRAZOLE SODIUM 40 MG: 40 TABLET, DELAYED RELEASE ORAL at 05:33

## 2022-04-09 RX ADMIN — METOPROLOL TARTRATE 100 MG: 100 TABLET, FILM COATED ORAL at 21:10

## 2022-04-09 RX ADMIN — LEVALBUTEROL HYDROCHLORIDE 1.25 MG: 1.25 SOLUTION RESPIRATORY (INHALATION) at 19:56

## 2022-04-09 NOTE — PROGRESS NOTES
General Cardiology   Progress Note -  [facility]   Alirio Rogers 47 y o  male MRN: [id number]  Unit/Bed#: S -01 Encounter: [id number]    Assessment  1  Acute on chronic hypoxic and hypercapnic respiratory failure  Multifactorial in setting of acute diastolic CHF and morbid obesity with OHS  Back to baseline O2 requirements  2  Acute on chronic diastolic CHF  Presented with weight gain, shortness of breath, LE edema  NT proBNP 1100 (up from 700)  Home diuretic: torsemide 40 mg BID, spironolactone 50 mg daily  Current weight: 508 lb per bed scale (below recent dry weight of 519 lb)  I/O: 5 5 L UOP, -4 4L/24 hours, net negative 10 L  Creatinine bumped to 1 07 today, K 3 7  Echo limited by body habitus but LV function estimated at 55%  3  Permanent atrial fibrillation, presenting with RVR- rates 80s-90s   4  Hypertension- controlled, average /60  5   History of DVT s/p IVC filter- on Xarelto  6  Morbid obesity with obesity hypoventilation syndrome- BMI 70 94  7   FILIPPO- compliant with CPAP at      Plan  1  Creatinine bumped today so stop IV Lasix  Resume oral diuretics tomorrow-torsemide 40 mg b i d   Continue spironolactone 50 mg daily  2  Continue increased metoprolol dose- 100 mg BID  Rates acceptable, 80s-100s  3  Continue anticoagulation with Xarelto  4  Continue to follow a sodium and fluid-restricted diet at nursing facility  5   Recommend outpatient BMP within the next week  6  Stable for discharge planning from cardiology perspective    Subjective  Feeling a lot better than when he came in, feels he can get a full breath in  Anxious to go home  Review of Systems   Constitutional: Negative for chills, malaise/fatigue and weight gain  Cardiovascular: Negative for chest pain, dyspnea on exertion, leg swelling, orthopnea, palpitations and syncope  Respiratory: Negative for cough, shortness of breath, sleep disturbances due to breathing and sputum production      Gastrointestinal: Negative for bloating and nausea  Neurological: Negative for dizziness, light-headedness and weakness  Psychiatric/Behavioral: Negative for altered mental status  All other systems reviewed and are negative  Objective:   Vitals: Blood pressure 128/55, pulse 102, temperature 98 4 °F (36 9 °C), temperature source Oral, resp  rate 18, height 5' 11" (1 803 m), weight (!) 231 kg (508 lb 9 6 oz), SpO2 95 %  , Body mass index is 70 94 kg/m²  ,     Systolic (91NQL), GPQ:217 , Min:125 , HAY:153     Diastolic (24RDT), ZCF:89, Min:55, Max:65      Intake/Output Summary (Last 24 hours) at 4/9/2022 1314  Last data filed at 4/9/2022 1041  Gross per 24 hour   Intake 1410 ml   Output 2700 ml   Net -1290 ml     Wt Readings from Last 3 Encounters:   04/09/22 (!) 231 kg (508 lb 9 6 oz)   03/17/22 (!) 256 kg (564 lb 9 6 oz)   02/22/22 (!) 242 kg (534 lb)     Telemetry Review: Afib, rates 80s-90s    Physical Exam  Vitals reviewed  Constitutional:       General: He is not in acute distress  Appearance: He is obese  Neck:      Vascular: No hepatojugular reflux or JVD  Cardiovascular:      Rate and Rhythm: Normal rate  Rhythm irregularly irregular  Heart sounds: Heart sounds are distant  No murmur heard  No friction rub  No gallop  Pulmonary:      Effort: Pulmonary effort is normal  No respiratory distress  Breath sounds: No rales  Abdominal:      General: Bowel sounds are normal  There is no distension  Palpations: Abdomen is soft  Tenderness: There is no abdominal tenderness  Musculoskeletal:         General: No tenderness  Normal range of motion  Cervical back: Neck supple  Right lower leg: Edema present  Left lower leg: Edema present  Skin:     General: Skin is warm and dry  Findings: No erythema  Neurological:      Mental Status: He is alert and oriented to person, place, and time     Psychiatric:         Mood and Affect: Mood normal      LABORATORY RESULTS      CBC with diff: Results from last 7 days   Lab Units 04/03/22  1224   WBC Thousand/uL 4 96   HEMOGLOBIN g/dL 11 9*   HEMATOCRIT % 40 3   MCV fL 98   PLATELETS Thousands/uL 201   MCH pg 28 9   MCHC g/dL 29 5*   RDW % 14 6   MPV fL 10 1   NRBC AUTO /100 WBCs 0     CMP:  Results from last 7 days   Lab Units 04/09/22  0637 04/08/22  0438 04/07/22  0622 04/06/22  0556 04/05/22  0554 04/04/22  0429 04/03/22  1224   POTASSIUM mmol/L 3 7 3 7 3 5 3 7 3 6 3 8 3 2*   CHLORIDE mmol/L 92* 98* 94* 92* 95* 94* 94*   CO2 mmol/L 42* 44* 44* >45* 44* >45* >45*   BUN mg/dL 19 16 18 14 15 12 12   CREATININE mg/dL 1 07 0 85 0 87 0 87 0 85 0 89 0 90   CALCIUM mg/dL 9 4 8 7 9 0 8 8 8 8 8 8 8 3   AST U/L  --   --   --   --   --   --  17   ALT U/L  --   --   --   --   --   --  24   ALK PHOS U/L  --   --   --   --   --   --  69   EGFR ml/min/1 73sq m 79 100 99 99 100 98 98     BMP:  Results from last 7 days   Lab Units 04/09/22  0637 04/08/22  0438 04/07/22  0622 04/06/22  0556 04/05/22  0554 04/04/22  0429 04/03/22  1224   POTASSIUM mmol/L 3 7 3 7 3 5 3 7 3 6 3 8 3 2*   CHLORIDE mmol/L 92* 98* 94* 92* 95* 94* 94*   CO2 mmol/L 42* 44* 44* >45* 44* >45* >45*   BUN mg/dL 19 16 18 14 15 12 12   CREATININE mg/dL 1 07 0 85 0 87 0 87 0 85 0 89 0 90   CALCIUM mg/dL 9 4 8 7 9 0 8 8 8 8 8 8 8 3       Lab Results   Component Value Date    CREATININE 1 07 04/09/2022    CREATININE 0 85 04/08/2022    CREATININE 0 87 04/07/2022       Lab Results   Component Value Date    NTBNP 1,121 (H) 04/03/2022    NTBNP 752 (H) 03/13/2022    NTBNP 607 (H) 02/18/2022      Results from last 7 days   Lab Units 04/06/22  0556 04/05/22  0554 04/04/22  0429 04/03/22  1224   MAGNESIUM mg/dL 2 1 1 9 1 9 1 7      Results from last 7 days   Lab Units 04/03/22  1224   TSH 3RD GENERATON uIU/mL 1 982     Lipid Profile:   No results found for: CHOL  Lab Results   Component Value Date    HDL 44 08/16/2016     Lab Results   Component Value Date    LDLCALC 80 08/16/2016     Lab Results   Component Value Date    TRIG 197 (H) 2016     Cardiac testing:   Results for orders placed during the hospital encounter of 05/10/21    Echo complete with contrast if indicated    Narrative  [address]  [address]    Transthoracic Echocardiogram  2D, M-mode, Doppler, and Color Doppler    Study date:  11-May-2021    Patient: Bertha Fletcher  MR number: [id number]  Account number: [de-identified]  : 1970  Age: 49 years  Gender: Male  Status: Outpatient  Location: [facility]  Height: 70 in  Weight: 409 2 lb  BP: 121/ 53 mmHg    Indications: Heart failure    Diagnoses: I50 9 - Heart failure, unspecified    Sonographer:  JOSE Rosario  Referring Physician:  Anna Jones MD  Group:  [facility]  Interpreting Physician:  Adenike Richter MD    SUMMARY    PROCEDURE INFORMATION:  This was a technically difficult study  LEFT VENTRICLE:  Systolic function was normal  Ejection fraction was estimated to be 60 %  This study was inadequate for the evaluation of regional wall motion  HISTORY: PRIOR HISTORY: afib, CHF, morbid obesity, DVT, COPD, DM, hypothyroid, MRSA    PROCEDURE: The study was performed in the [facility]  This was a routine study  The transthoracic approach was used  The study included complete 2D imaging, M-mode, complete spectral Doppler, and color Doppler  The heart rate was 86  bpm, at the start of the study  Images were obtained from the parasternal, apical, subcostal, and suprasternal notch acoustic windows  Echocardiographic views were limited due to restricted patient mobility, poor acoustic window  availability, decreased penetration, and lung interference  This was a technically difficult study  LEFT VENTRICLE: Size was normal  Systolic function was normal  Ejection fraction was estimated to be 60 %  This study was inadequate for the evaluation of regional wall motion   Wall thickness could not be accurately determined  DOPPLER:  The study was not technically sufficient to allow evaluation of LV diastolic function  RIGHT VENTRICLE: Systolic function was normal  Not well visualized  LEFT ATRIUM: Not well visualized  RIGHT ATRIUM: Not well visualized  MITRAL VALVE: Not well visualized  DOPPLER: The transmitral velocity was within the normal range  There was trace regurgitation  AORTIC VALVE: The valve was probably trileaflet  The valve was not well visualized  DOPPLER: Transaortic velocity was within the normal range  There was no evidence for stenosis  TRICUSPID VALVE: Not well visualized  DOPPLER: The transtricuspid velocity was within the normal range  There was trace regurgitation  Pulmonary artery systolic pressure was within the normal range  Estimated peak PA pressure was 27 mmHg  PULMONIC VALVE: Not well visualized  DOPPLER: The transpulmonic velocity was within the normal range  There was trace regurgitation  PERICARDIUM: Not well visualized  AORTA: Not well visualized      SYSTEM MEASUREMENT TABLES    2D  Ao Diam: 3 11 cm  LA Diam: 4 34 cm    CW  AV Vmax: 1 22 m/s  AV maxP 91 mmHg  PV Vmax: 1 13 m/s  PV maxP 13 mmHg  TR Vmax: 2 72 m/s  TR maxP 6 mmHg    [facility]    Prepared and electronically signed by    Erendira Kelly MD  Signed 11-May-2021 11:37:20    Meds/Allergies   all current active meds have been reviewed and current meds:   Current Facility-Administered Medications   Medication Dose Route Frequency    acetaminophen (TYLENOL) tablet 975 mg  975 mg Oral Q8H [address]    aluminum-magnesium hydroxide-simethicone (MYLANTA) oral suspension 30 mL  30 mL Oral Q6H PRN    budesonide-formoterol (SYMBICORT) 160-4 5 mcg/act inhaler 2 puff  2 puff Inhalation BID    buPROPion (WELLBUTRIN XL) 24 hr tablet 450 mg  450 mg Oral Daily    dabigatran etexilate (PRADAXA) capsule 150 mg  150 mg Oral BID    dicyclomine (BENTYL) tablet 20 mg  20 mg Oral 4x Daily (AC & HS)    diphenhydrAMINE (BENADRYL) injection 25 mg  25 mg Intravenous Q6H PRN    famotidine (PEPCID) tablet 20 mg  20 mg Oral Daily    ipratropium (ATROVENT) 0 02 % inhalation solution 0 5 mg  0 5 mg Nebulization TID    levalbuterol (XOPENEX) inhalation solution 1 25 mg  1 25 mg Nebulization TID    levothyroxine tablet 25 mcg  25 mcg Oral Early Morning    lidocaine (LIDODERM) 5 % patch 1 patch  1 patch Topical Daily    metoprolol tartrate (LOPRESSOR) tablet 100 mg  100 mg Oral Q12H MARY    pantoprazole (PROTONIX) EC tablet 40 mg  40 mg Oral Daily    potassium chloride (K-DUR,KLOR-CON) CR tablet 40 mEq  40 mEq Oral BID    saccharomyces boulardii (FLORASTOR) capsule 250 mg  250 mg Oral BID    spironolactone (ALDACTONE) tablet 50 mg  50 mg Oral Daily    [START ON 4/10/2022] torsemide (DEMADEX) tablet 40 mg  40 mg Oral BID (diuretic)     Medications Prior to Admission   Medication    acetaminophen (TYLENOL) 500 mg tablet    albuterol (2 5 mg/3 mL) 0 083 % nebulizer solution    aluminum-magnesium hydroxide-simethicone (MYLANTA) 200-200-20 mg/5 mL suspension    ammonium lactate (LAC-HYDRIN) 12 % cream    budesonide-formoterol (SYMBICORT) 160-4 5 mcg/act inhaler    buPROPion (FORFIVO XL) 450 MG 24 hr tablet    Cholecalciferol (VITAMIN D) 50 MCG (2000 UT) tablet    cyanocobalamin (VITAMIN B-12) 1000 MCG tablet    dabigatran etexilate (PRADAXA) 150 mg capsu    dicyclomine (BENTYL) 20 mg tablet    digoxin (LANOXIN) 0 125 mg tablet    docusate sodium (COLACE) 100 mg capsule    famotidine (PEPCID) 20 mg tablet    gabapentin (NEURONTIN) 300 mg capsule    hydrOXYzine pamoate (VISTARIL) 25 mg capsule    ipratropium-albuterol (DUO-NEB) 0 5-2 5 mg/3 mL nebulizer solution    levothyroxine 25 mcg tablet    metoclopramide (REGLAN) 10 mg tablet    metoprolol tartrate (LOPRESSOR) 50 mg tablet    nystatin (MYCOSTATIN) cream    ondansetron (ZOFRAN) 4 mg tablet  pantoprazole (PROTONIX) 20 mg tablet    polyethylene glycol (MIRALAX) 17 g packet    Potassium Chloride (KCL-20 PO)    spironolactone (ALDACTONE) 50 mg tablet    torsemide 40 MG TABS    sodium chloride (OCEAN) 0 65 % nasal spray     Counseling / Coordination of Care  Total floor / unit time spent today 20 minutes  Greater than 50% of total time was spent with the patient and / or family counseling and / or coordination of care  ** Please Note: Dragon 360 Dictation voice to text software may have been used in the creation of this document   **

## 2022-04-09 NOTE — ASSESSMENT & PLAN NOTE
Wt Readings from Last 3 Encounters:   04/09/22 (!) 231 kg (508 lb 9 6 oz)   03/17/22 (!) 256 kg (564 lb 9 6 oz)   02/22/22 (!) 242 kg (534 lb)     · Very difficult to evaluate volume status however has been compliant with torsemide  · Home regimen is torsemide 40 mg b i d  Angel Luis Cai · Was started on IV Lasix 80 mg b i d  And spironolactone  · Weight today 508  Patient received 2 doses total of metolazone on 4/6 and 4/7  · Monitor for improvement of output, continue to monitor daily weight  · Cardiology following    · Monitor BMP

## 2022-04-09 NOTE — DISCHARGE SUMMARY
[facility]  Discharge- Mishel Rizvi 1970, 47 y o  male MRN: [id number]  Unit/Bed#: S -01 Encounter: [id number]  Primary Care Provider: Bia Starr MD   Date and time admitted to hospital: 4/3/2022 11:42 AM    * Atrial fibrillation with RVR ([address] )  Assessment & Plan  · Patient presents with chest pain and shortness of breath related to AFib RVR  Presented with heart rate of 130  · Was briefly on IV Cardizem infusion upon admission, now discontinued  · Continue Lopressor 100 mg b i d ,  Rates improved and BP tolerating  · No longer on digoxin  · Continue Pradaxa for anticoagulation  Acute on chronic diastolic congestive heart failure [facility])  Assessment & Plan  Wt Readings from Last 3 Encounters:   04/09/22 (!) 231 kg (508 lb 9 6 oz)   03/17/22 (!) 256 kg (564 lb 9 6 oz)   02/22/22 (!) 242 kg (534 lb)     · Very difficult to evaluate volume status however has been compliant with torsemide  · Home regimen is torsemide 40 mg b i d  Angel Luis Cai · Was started on IV Lasix 80 mg b i d  And spironolactone  · Weight is 508  Patient received 2 doses total of metolazone on 4/6 and 4/7  · Resume oral torsemide  · Cardiology input appreciated  · Monitor BMP    Chronic respiratory failure with hypoxia (HCC)  Assessment & Plan  · Wears 2 liters/minute chronically, at baseline  · Monitor respiratory status  Hypothyroidism  Assessment & Plan  · TSH within normal limits  · Continue Synthroid    Knee pain  Assessment & Plan  · Discussed discontinuing IV morphine with patient, patient agreeable  · Will trial Tylenol, Lidoderm patches    Obesity hypoventilation syndrome/FILIPPO  Assessment & Plan  · Continue CPAP q h s  Diabetes mellitus [facility])  Assessment & Plan  Lab Results   Component Value Date    HGBA1C 5 4 03/21/2022       No results for input(s): POCGLU in the last 72 hours      Blood Sugar Average: Last 72 hrs:  · A1c 5 4   · Not maintained on anti-hyperglycemic medications  · ADA diet    Tremor  Assessment & Plan  · Noted history of tremors, worsening while hospitalized here  · Likely related to hypercapnea  · Now improving  · Stopped gabapentin  · Can trial Benadryl, can also consider Cogentin  · Monitor for improvement as hypercapnea improves    Morbid obesity (HCC)  Assessment & Plan  · Body mass index is 70 94 kg/m²  · Outpatient bariatric surgery referral has been placed during a prior admission  · Dietary modifications    Medical Problems             Resolved Problems  Date Reviewed: 4/9/2022          Resolved    Hypokalemia 4/4/2022     Resolved by  Savannah Aguirre PA-C              Discharging Physician / Practitioner: Melina Jones PA-C  PCP: Claudius Mcardle, MD  Admission Date:   Admission Orders (From admission, onward)     Ordered        04/04/22 0846  Inpatient Admission  Once            04/03/22 1352  Place in Observation  Once                      Discharge Date: 4/10/22    Consultations During Hospital Stay:  · Cardiology     Procedures Performed:   · None     Significant Findings / Test Results:   · Echocardiogram with EF 55%, technically difficult study due to body habitus  Incidental Findings:   · None      Test Results Pending at Discharge (will require follow up): · None      Outpatient Tests Requested:  · None     Complications:  None     Reason for Admission: CHF, a fib RVR    Hospital Course:   Jcarlos Pak is a 47 y o  male patient who originally presented to the hospital on 4/3/2022 due to AFib RVR and CHF exacerbation  Patient was significantly over his dry weight with heart rates into the 130s upon admission  He was briefly stabilized on Cardizem infusion, with up titrating beta blocker, metoprolol 100 mg b i d     Throughout his hospitalization his rates were improved and stabilized  For his heart failure who was diuresed with IV Lasix 80 mg b i d  with good response  He received 2 doses of Zaroxolyn 4/6 and 4/7    His renal function was stable throughout his hospitalization  He was seen in consultation by Cardiology who recommended outpatient follow-up  He was discharged back to [address] [place] [address] Drive  Please see above list of diagnoses and related plan for additional information  Condition at Discharge: stable    Discharge Day Visit / Exam:   Subjective:  Patient reports feeling well with no chest pain or shortness of breath  Heart rate is improved  Stable for discharge with outpatient follow-up  Vitals: Blood Pressure: 128/55 (04/09/22 0709)  Pulse: 102 (04/09/22 0709)  Temperature: 98 4 °F (36 9 °C) (04/09/22 0709)  Temp Source: Oral (04/09/22 0709)  Respirations: 18 (04/09/22 0709)  Height: 5' 11" (180 3 cm) (04/04/22 1600)  Weight - Scale: (!) 231 kg (508 lb 9 6 oz) (patient unable to stand at this time) (04/09/22 0600)  SpO2: 95 % (04/09/22 1307)  Exam:   Physical Exam  Vitals and nursing note reviewed  Constitutional:       General: He is not in acute distress  Appearance: Normal appearance  He is obese  HENT:      Head: Normocephalic  Mouth/Throat:      Mouth: Mucous membranes are moist    Eyes:      Pupils: Pupils are equal, round, and reactive to light  Cardiovascular:      Rate and Rhythm: Normal rate  Rhythm irregular  Heart sounds: No murmur heard  Pulmonary:      Effort: Pulmonary effort is normal  No respiratory distress  Breath sounds: Normal breath sounds  No wheezing, rhonchi or rales  Abdominal:      General: Bowel sounds are normal  There is no distension  Palpations: Abdomen is soft  Tenderness: There is no abdominal tenderness  There is no guarding  Musculoskeletal:         General: No deformity  Cervical back: Normal range of motion  Right lower leg: No edema  Left lower leg: No edema  Skin:     Capillary Refill: Capillary refill takes less than 2 seconds  Neurological:      General: No focal deficit present        Mental Status: He is alert and oriented to person, place, and time  Mental status is at baseline  Discussion with Family: Patient declined call to   Discharge instructions/Information to patient and family:   See after visit summary for information provided to patient and family  Provisions for Follow-Up Care:  See after visit summary for information related to follow-up care and any pertinent home health orders  Disposition:   [place] [facility]    Planned Readmission: no     Discharge Statement:  I spent 45 minutes discharging the patient  This time was spent on the day of discharge  I had direct contact with the patient on the day of discharge  Greater than 50% of the total time was spent examining patient, answering all patient questions, arranging and discussing plan of care with patient as well as directly providing post-discharge instructions  Additional time then spent on discharge activities  Discharge Medications:  See after visit summary for reconciled discharge medications provided to patient and/or family        **Please Note: This note may have been constructed using a voice recognition system**

## 2022-04-09 NOTE — ASSESSMENT & PLAN NOTE
· Body mass index is 70 94 kg/m²  · Outpatient bariatric surgery referral has been placed during a prior admission    · Dietary modifications

## 2022-04-09 NOTE — ASSESSMENT & PLAN NOTE
· Patient presents with chest pain and shortness of breath related to AFib RVR  Presented with heart rate of 130  · Was briefly on IV Cardizem infusion upon admission, now discontinued  · Continue Lopressor 100 mg b i d ,  Rates improved and BP tolerating  · No longer on digoxin  · Continue Pradaxa for anticoagulation

## 2022-04-09 NOTE — ASSESSMENT & PLAN NOTE
Wt Readings from Last 3 Encounters:   04/09/22 (!) 231 kg (508 lb 9 6 oz)   03/17/22 (!) 256 kg (564 lb 9 6 oz)   02/22/22 (!) 242 kg (534 lb)     · Very difficult to evaluate volume status however has been compliant with torsemide  · Home regimen is torsemide 40 mg b i d  Dortha Plough · Was started on IV Lasix 80 mg b i d  And spironolactone  · Weight is 508   Patient received 2 doses total of metolazone on 4/6 and 4/7  · Resume oral torsemide  · Cardiology input appreciated  · Monitor BMP

## 2022-04-09 NOTE — PROGRESS NOTES
[facility]  Progress Note - Josee Parris 1970, 47 y o  male MRN: [id number]  Unit/Bed#: S -01 Encounter: [id number]  Primary Care Provider: Daniel Yanes MD   Date and time admitted to hospital: 4/3/2022 11:42 AM    * Atrial fibrillation with RVR ([address] )  Assessment & Plan  · Patient presents with chest pain and shortness of breath related to AFib RVR  Presented with heart rate of 130  · Was briefly on IV Cardizem infusion upon admission, now discontinued  · Continue Lopressor 100 mg b i d ,  Rates improved and BP tolerating  · No longer on digoxin  · Continue Pradaxa for anticoagulation  · Cardiology following  · Monitor rate control on telemetry  · Heart rate averaging low 90s    Acute on chronic diastolic congestive heart failure [facility])  Assessment & Plan  Wt Readings from Last 3 Encounters:   04/09/22 (!) 231 kg (508 lb 9 6 oz)   03/17/22 (!) 256 kg (564 lb 9 6 oz)   02/22/22 (!) 242 kg (534 lb)     · Very difficult to evaluate volume status however has been compliant with torsemide  · Home regimen is torsemide 40 mg b i d  Macey Burris · Was started on IV Lasix 80 mg b i d  And spironolactone  · Weight today 508  Patient received 2 doses total of metolazone on 4/6 and 4/7  · Monitor for improvement of output, continue to monitor daily weight  · Cardiology following  · Monitor BMP    Chronic respiratory failure with hypoxia (HCC)  Assessment & Plan  · Wears 2 liters/minute chronically, at baseline  · Monitor respiratory status  Hypothyroidism  Assessment & Plan  · TSH within normal limits  · Continue Synthroid    Knee pain  Assessment & Plan  · Discussed discontinuing IV morphine with patient, patient agreeable  · Will trial Tylenol, Lidoderm patches    Obesity hypoventilation syndrome/FILIPPO  Assessment & Plan  · Continue CPAP q h s      Diabetes mellitus [facility])  Assessment & Plan  Lab Results   Component Value Date    HGBA1C 5 4 03/21/2022       No results for input(s): POCGLU in the last 72 hours  Blood Sugar Average: Last 72 hrs:  · A1c 5 4   · Not maintained on anti-hyperglycemic medications  · ADA diet    Tremor  Assessment & Plan  · Noted history of tremors, worsening while hospitalized here  · Likely related to hypercapnea  · Now improving  · Stopped gabapentin  · Can trial Benadryl, can also consider Cogentin  · Monitor for improvement as hypercapnea improves    Morbid obesity (HCC)  Assessment & Plan  · Body mass index is 70 94 kg/m²  · Outpatient bariatric surgery referral has been placed during a prior admission  · Dietary modifications      VTE Pharmacologic Prophylaxis: VTE Score: 7 High Risk (Score >/= 5) - Pharmacological DVT Prophylaxis Ordered: dabigatran (Pradaxa)  Sequential Compression Devices Ordered  Patient Centered Rounds: I performed bedside rounds with nursing staff today  Discussions with Specialists or Other Care Team Provider: CM, nursing    Education and Discussions with Family / Patient: Patient declined call to   Time Spent for Care: 30 minutes  More than 50% of total time spent on counseling and coordination of care as described above  Current Length of Stay: 5 day(s)  Current Patient Status: Inpatient   Certification Statement: The patient will continue to require additional inpatient hospital stay due to IV diuresis for chf  Discharge Plan: Anticipate discharge in 24-48 hrs to discharge location to be determined pending rehab evaluations  GoE    Code Status: Level 1 - Full Code    Subjective:   Reports his shortness of breath is at baseline  Denies chest pain  No events on tele  Heart rate improved  UO is good  Objective:     Vitals:   Temp (24hrs), Av 3 °F (36 8 °C), Min:98 2 °F (36 8 °C), Max:98 4 °F (36 9 °C)    Temp:  [98 2 °F (36 8 °C)-98 4 °F (36 9 °C)] 98 4 °F (36 9 °C)  HR:  [] 102  Resp:  [18] 18  BP: (125-133)/(55-65) 128/55  SpO2:  [93 %-98 %] 96 %  Body mass index is 70 94 kg/m²       Input and Output Summary (last 24 hours): Intake/Output Summary (Last 24 hours) at 4/9/2022 0817  Last data filed at 4/9/2022 0006  Gross per 24 hour   Intake 1170 ml   Output 5575 ml   Net -4405 ml       Physical Exam:   Physical Exam  Vitals and nursing note reviewed  Constitutional:       General: He is not in acute distress  Appearance: Normal appearance  He is obese  Interventions: Nasal cannula in place  HENT:      Head: Normocephalic  Mouth/Throat:      Mouth: Mucous membranes are moist    Eyes:      Pupils: Pupils are equal, round, and reactive to light  Cardiovascular:      Rate and Rhythm: Normal rate  Rhythm irregular  Heart sounds: No murmur heard  Pulmonary:      Effort: Pulmonary effort is normal  No respiratory distress  Breath sounds: Normal breath sounds  No wheezing, rhonchi or rales  Abdominal:      General: Bowel sounds are normal  There is no distension  Palpations: Abdomen is soft  Tenderness: There is no abdominal tenderness  There is no guarding  Musculoskeletal:         General: No deformity  Cervical back: Normal range of motion  Right lower leg: Edema present  Left lower leg: Edema present  Skin:     Capillary Refill: Capillary refill takes less than 2 seconds  Neurological:      General: No focal deficit present  Mental Status: He is alert and oriented to person, place, and time  Mental status is at baseline           Additional Data:     Labs:  Results from last 7 days   Lab Units 04/03/22  1224   WBC Thousand/uL 4 96   HEMOGLOBIN g/dL 11 9*   HEMATOCRIT % 40 3   PLATELETS Thousands/uL 201   NEUTROS PCT % 72   LYMPHS PCT % 17   MONOS PCT % 8   EOS PCT % 2     Results from last 7 days   Lab Units 04/09/22  0637 04/04/22  0429 04/03/22  1224   SODIUM mmol/L 139   < > 144   POTASSIUM mmol/L 3 7   < > 3 2*   CHLORIDE mmol/L 92*   < > 94*   CO2 mmol/L 42*   < > >45*   BUN mg/dL 19   < > 12   CREATININE mg/dL 1 07   < > 0 90 ANION GAP mmol/L 5   < >  --    CALCIUM mg/dL 9 4   < > 8 3   ALBUMIN g/dL  --   --  2 7*   TOTAL BILIRUBIN mg/dL  --   --  0 48   ALK PHOS U/L  --   --  69   ALT U/L  --   --  24   AST U/L  --   --  17   GLUCOSE RANDOM mg/dL 127   < > 129    < > = values in this interval not displayed  Lines/Drains:  Invasive Devices  Report    Peripheral Intravenous Line            Peripheral IV 04/03/22 Left Antecubital 5 days                  Telemetry:  Telemetry Orders (From admission, onward)             48 Hour Telemetry Monitoring  Continuous x 48 hours        References:    Telemetry Guidelines   Question:  Reason for 48 Hour Telemetry  Answer:  Acute Decompensated CHF (continuous diuretic infusion or total diuretic dose > 200 mg daily, associated electrolyte derangement, ionotropic drip, history of ventricular arrhythmia, or new EF <35%)                 Telemetry Reviewed: Atrial fibrillation  HR averaging 90  Indication for Continued Telemetry Use: Arrthymias requiring medical therapy             Imaging: No pertinent imaging reviewed      Recent Cultures (last 7 days):         Last 24 Hours Medication List:   Current Facility-Administered Medications   Medication Dose Route Frequency Provider Last Rate    acetaminophen  975 mg Oral [facility] Deyanne Likens, PA-C      aluminum-magnesium hydroxide-simethicone  30 mL Oral Q6H PRN Deyanne Likens, PA-C      budesonide-formoterol  2 puff Inhalation BID Deyanne Likens, PA-C      buPROPion  450 mg Oral Daily Deyanne Likens, PA-C      dabigatran etexilate  150 mg Oral BID Deyanne Likens, PA-C      dicyclomine  20 mg Oral 4x Daily [facility] Deyanne Likens, PA-C      diphenhydrAMINE  25 mg Intravenous Q6H PRN Deyanne Likens, PA-C      famotidine  20 mg Oral Daily Deyanne Likens, PA-C      furosemide  80 mg Intravenous BID (diuretic) Zak Fillers, DO      ipratropium  0 5 mg Nebulization TID Zak Fillers, DO      levalbuterol  1 25 mg Nebulization TID Ada Se, DO      levothyroxine  25 mcg Oral Early Morning Lj Garcia PA-C      lidocaine  1 patch Topical Daily Shana Matias PA-C      metoprolol tartrate  100 mg Oral Q12H [address] [place], CRNP      pantoprazole  40 mg Oral Daily Julianne Paez      potassium chloride  40 mEq Oral BID Thalia, RADHA      saccharomyces boulardii  250 mg Oral BID Lj Garcia PA-C      spironolactone  50 mg Oral Daily Lj Garcia PA-C          Today, Patient Was Seen By: Leonidas Torres PA-C    **Please Note: This note may have been constructed using a voice recognition system  **

## 2022-04-09 NOTE — CASE MANAGEMENT
Case Management Discharge Planning Note    Patient name Modoc Marco A  Location S /S -45 MRN [id number]  : 1970 Date 2022       Current Admission Date: 4/3/2022  Current Admission Diagnosis:Atrial fibrillation with RVR [facility])   Patient Active Problem List    Diagnosis Date Noted    Intractable abdominal pain 2022    Epidermoid cyst of neck 2022    Hypotension 2021    Chronic respiratory failure with hypoxia ([address] ) 2021    Cellulitis of multiple sites of head and neck 2021    Ventral hernia without obstruction or gangrene 2021    Elevated troponin 2021    History of DVT (deep vein thrombosis) 2021    Positive blood culture 2021    QT prolongation 2021    COPD (chronic obstructive pulmonary disease) [address] [address] ) 05/10/2021    Hypothyroidism 05/10/2021    Medical non-compliance 2017    Atrial fibrillation with RVR ([facility] ) 2017    Foot pain 11/15/2016    Knee pain 11/15/2016    Ingrown toenail without infection 2016    Recurrent bronchospasm 10/16/2016    Venous stasis dermatitis of both lower extremities 10/16/2016    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[facility] ) 10/14/2016    Obesity hypoventilation syndrome/FILIPPO 2016    Acute on chronic diastolic congestive heart failure ([facility] ) 2016    MRSA (methicillin resistant Staphylococcus aureus) Tracheobronchitis 2016    Shortness of breath 08/15/2016    Morbid obesity ([facility] [address] ) 08/15/2016    Tremor 08/15/2016    Diabetes mellitus ([facility] ) 08/15/2016      LOS (days): 5  Geometric Mean LOS (GMLOS) (days):   Days to GMLOS:     OBJECTIVE:  Risk of Unplanned Readmission Score: 29         Current admission status: Inpatient   Preferred Pharmacy:   [id number] [address]  [address]   [address] [address], [address] [facility]  Phone: [phone] Fax: [phone]    Primary Care Provider: Jaimee Venegas MD    Primary Insurance: [id number] [facility]  Secondary Insurance:     DISCHARGE DETAILS:    Discharge planning discussed with[de-identified] Patient, Mike Chavez and [address] of Choice: CM notified the above mentioned individuals of the Pt's d/c back to the [address] tomorrow  Contacts  Patient Contacts: Teodoro Bowdener  Relationship to Patient[de-identified] Friend  Contact Method: Phone  Reason/Outcome: Discharge Planning         Transport at Discharge : [facility]  Dispatcher Contacted: Yes  Number/Name of Dispatcher: Yasmin Bacon [id number]  [facility]  [facility]  ETA of Transport (Date): 04/10/22  ETA of Transport (Time): 1100

## 2022-04-09 NOTE — ASSESSMENT & PLAN NOTE
· Noted history of tremors, worsening while hospitalized here  · Likely related to hypercapnea  · Now improving  · Stopped gabapentin  · Can trial Benadryl, can also consider Cogentin    · Monitor for improvement as hypercapnea improves

## 2022-04-10 VITALS
WEIGHT: 315 LBS | RESPIRATION RATE: 18 BRPM | HEART RATE: 100 BPM | HEIGHT: 71 IN | TEMPERATURE: 98 F | OXYGEN SATURATION: 95 % | SYSTOLIC BLOOD PRESSURE: 135 MMHG | DIASTOLIC BLOOD PRESSURE: 62 MMHG | BODY MASS INDEX: 44.1 KG/M2

## 2022-04-10 LAB
ANION GAP SERPL CALCULATED.3IONS-SCNC: 2 MMOL/L (ref 4–13)
BUN SERPL-MCNC: 20 MG/DL (ref 5–25)
CALCIUM SERPL-MCNC: 9.3 MG/DL (ref 8.3–10.1)
CHLORIDE SERPL-SCNC: 94 MMOL/L (ref 100–108)
CO2 SERPL-SCNC: 39 MMOL/L (ref 21–32)
CREAT SERPL-MCNC: 0.93 MG/DL (ref 0.6–1.3)
GFR SERPL CREATININE-BSD FRML MDRD: 94 ML/MIN/1.73SQ M
GLUCOSE SERPL-MCNC: 132 MG/DL (ref 65–140)
POTASSIUM SERPL-SCNC: 4.1 MMOL/L (ref 3.5–5.3)
SODIUM SERPL-SCNC: 135 MMOL/L (ref 136–145)

## 2022-04-10 PROCEDURE — 99239 HOSP IP/OBS DSCHRG MGMT >30: CPT | Performed by: PHYSICIAN ASSISTANT

## 2022-04-10 PROCEDURE — 94640 AIRWAY INHALATION TREATMENT: CPT

## 2022-04-10 PROCEDURE — 80048 BASIC METABOLIC PNL TOTAL CA: CPT | Performed by: PHYSICIAN ASSISTANT

## 2022-04-10 PROCEDURE — 94760 N-INVAS EAR/PLS OXIMETRY 1: CPT

## 2022-04-10 RX ORDER — METOPROLOL TARTRATE 50 MG/1
100 TABLET, FILM COATED ORAL EVERY 12 HOURS SCHEDULED
Refills: 0
Start: 2022-04-10 | End: 2022-07-19

## 2022-04-10 RX ADMIN — FAMOTIDINE 20 MG: 20 TABLET, FILM COATED ORAL at 09:54

## 2022-04-10 RX ADMIN — LEVALBUTEROL HYDROCHLORIDE 1.25 MG: 1.25 SOLUTION RESPIRATORY (INHALATION) at 13:35

## 2022-04-10 RX ADMIN — DICYCLOMINE HYDROCHLORIDE 20 MG: 20 TABLET ORAL at 12:13

## 2022-04-10 RX ADMIN — IPRATROPIUM BROMIDE 0.5 MG: 0.5 SOLUTION RESPIRATORY (INHALATION) at 13:35

## 2022-04-10 RX ADMIN — SPIRONOLACTONE 50 MG: 25 TABLET ORAL at 09:53

## 2022-04-10 RX ADMIN — BUPROPION HYDROCHLORIDE 450 MG: 150 TABLET, FILM COATED, EXTENDED RELEASE ORAL at 09:54

## 2022-04-10 RX ADMIN — IPRATROPIUM BROMIDE 0.5 MG: 0.5 SOLUTION RESPIRATORY (INHALATION) at 07:51

## 2022-04-10 RX ADMIN — DICYCLOMINE HYDROCHLORIDE 20 MG: 20 TABLET ORAL at 06:22

## 2022-04-10 RX ADMIN — DABIGATRAN ETEXILATE MESYLATE 150 MG: 150 CAPSULE ORAL at 09:54

## 2022-04-10 RX ADMIN — POTASSIUM CHLORIDE 40 MEQ: 1500 TABLET, EXTENDED RELEASE ORAL at 09:54

## 2022-04-10 RX ADMIN — LIDOCAINE 5% 1 PATCH: 700 PATCH TOPICAL at 09:54

## 2022-04-10 RX ADMIN — METOPROLOL TARTRATE 100 MG: 100 TABLET, FILM COATED ORAL at 09:54

## 2022-04-10 RX ADMIN — LEVOTHYROXINE SODIUM 25 MCG: 25 TABLET ORAL at 06:22

## 2022-04-10 RX ADMIN — BUDESONIDE AND FORMOTEROL FUMARATE DIHYDRATE 2 PUFF: 160; 4.5 AEROSOL RESPIRATORY (INHALATION) at 09:54

## 2022-04-10 RX ADMIN — TORSEMIDE 40 MG: 20 TABLET ORAL at 08:30

## 2022-04-10 RX ADMIN — ACETAMINOPHEN 325MG 975 MG: 325 TABLET ORAL at 06:22

## 2022-04-10 RX ADMIN — Medication 250 MG: at 09:54

## 2022-04-10 RX ADMIN — PANTOPRAZOLE SODIUM 40 MG: 40 TABLET, DELAYED RELEASE ORAL at 06:22

## 2022-04-10 RX ADMIN — LEVALBUTEROL HYDROCHLORIDE 1.25 MG: 1.25 SOLUTION RESPIRATORY (INHALATION) at 07:51

## 2022-04-10 NOTE — PLAN OF CARE
Problem: Potential for Falls  Goal: Patient will remain free of falls  Description: INTERVENTIONS:  - Educate patient/family on patient safety including physical limitations  - Instruct patient to call for assistance with activity   - Consult OT/PT to assist with strengthening/mobility   - Keep Call bell within reach  - Keep bed low and locked with side rails adjusted as appropriate  - Keep care items and personal belongings within reach  - Initiate and maintain comfort rounds  - Make Fall Risk Sign visible to staff  - Offer Toileting every  Hours, in advance of need  - Initiate/Maintain alarm  - Obtain necessary fall risk management equipment:   - Apply yellow socks and bracelet for high fall risk patients  - Consider moving patient to room near nurses station  Outcome: Adequate for Discharge     Problem: MOBILITY - ADULT  Goal: Maintain or return to baseline ADL function  Description: INTERVENTIONS:  -  Assess patient's ability to carry out ADLs; assess patient's baseline for ADL function and identify physical deficits which impact ability to perform ADLs (bathing, care of mouth/teeth, toileting, grooming, dressing, etc )  - Assess/evaluate cause of self-care deficits   - Assess range of motion  - Assess patient's mobility; develop plan if impaired  - Assess patient's need for assistive devices and provide as appropriate  - Encourage maximum independence but intervene and supervise when necessary  - Involve family in performance of ADLs  - Assess for home care needs following discharge   - Consider OT consult to assist with ADL evaluation and planning for discharge  - Provide patient education as appropriate  Outcome: Adequate for Discharge  Goal: Maintains/Returns to pre admission functional level  Description: INTERVENTIONS:  - Perform BMAT or MOVE assessment daily    - Set and communicate daily mobility goal to care team and patient/family/caregiver     - Collaborate with rehabilitation services on mobility goals if consulted  - Perform Range of Motion  times a day  - Reposition patient every  hours  - Dangle patient  times a day  - Stand patient  times a day  - Ambulate patient  times a day  - Out of bed to chair  times a day   - Out of bed for meals times a day  - Out of bed for toileting  - Record patient progress and toleration of activity level   Outcome: Adequate for Discharge     Problem: Prexisting or High Potential for Compromised Skin Integrity  Goal: Skin integrity is maintained or improved  Description: INTERVENTIONS:  - Identify patients at risk for skin breakdown  - Assess and monitor skin integrity  - Assess and monitor nutrition and hydration status  - Monitor labs   - Assess for incontinence   - Turn and reposition patient  - Assist with mobility/ambulation  - Relieve pressure over bony prominences  - Avoid friction and shearing  - Provide appropriate hygiene as needed including keeping skin clean and dry  - Evaluate need for skin moisturizer/barrier cream  - Collaborate with interdisciplinary team   - Patient/family teaching  - Consider wound care consult   Outcome: Adequate for Discharge     Problem: Nutrition/Hydration-ADULT  Goal: Nutrient/Hydration intake appropriate for improving, restoring or maintaining nutritional needs  Description: Monitor and assess patient's nutrition/hydration status for malnutrition  Collaborate with interdisciplinary team and initiate plan and interventions as ordered  Monitor patient's weight and dietary intake as ordered or per policy  Utilize nutrition screening tool and intervene as necessary  Determine patient's food preferences and provide high-protein, high-caloric foods as appropriate       INTERVENTIONS:  - Monitor oral intake, urinary output, labs, and treatment plans  - Assess nutrition and hydration status and recommend course of action  - Evaluate amount of meals eaten  - Assist patient with eating if necessary   - Allow adequate time for meals  - Recommend/ encourage appropriate diets, oral nutritional supplements, and vitamin/mineral supplements  - Order, calculate, and assess calorie counts as needed  - Recommend, monitor, and adjust tube feedings and TPN/PPN based on assessed needs  - Assess need for intravenous fluids  - Provide specific nutrition/hydration education as appropriate  - Include patient/family/caregiver in decisions related to nutrition  Outcome: Adequate for Discharge

## 2022-04-11 NOTE — UTILIZATION REVIEW
Notification of Discharge   This is a Notification of Discharge from our facility [address]  Please be advised that this patient has been discharge from our facility  Below you will find the admission and discharge date and time including the patients disposition  UTILIZATION REVIEW CONTACT:  Mark Mathews MA  Utilization   Network Utilization Review Department  Phone: [phone] x carefully listen to the prompts  All voicemails are confidential   Email: [email or web address]     PHYSICIAN ADVISORY SERVICES:  FOR INPM-FV-LYHU REVIEW - MEDICAL NECESSITY DENIAL  Phone: [phone]  Fax: [phone]  Email: [email or web address]     PRESENTATION DATE: 4/3/2022 11:42 AM  OBERVATION ADMISSION DATE:   INPATIENT ADMISSION DATE: 4/4/22  8:46 AM   DISCHARGE DATE: 4/10/2022  3:28 PM  DISPOSITION: Non [facility] SN[facility]/SNU Non [facility] SN[facility]/TC[facility]/SNU      IMPORTANT INFORMATION:  Send all requests for admission clinical reviews, approved or denied determinations and any other requests to dedicated fax number below belonging to the campus where the patient is receiving treatment   List of dedicated fax numbers:  [address] [place] [address] DENIALS (Administrative/Medical Necessity) [phone]   [address]  (Maternity/NICU/Pediatrics) [phone]   [facility] [place] [phone]   [address] [facility] [phone]   [address] [place] [address] [phone]   [address] [place] [address],[address] [phone]   [facility]  [phone]   [address] [facility], [facility]  [address] [facility] [address] [facility] [phone]

## 2022-05-13 ENCOUNTER — NURSING HOME VISIT (OUTPATIENT)
Dept: GERIATRICS | Facility: OTHER | Age: 52
End: 2022-05-13
Payer: COMMERCIAL

## 2022-05-13 VITALS
BODY MASS INDEX: 44.1 KG/M2 | SYSTOLIC BLOOD PRESSURE: 136 MMHG | DIASTOLIC BLOOD PRESSURE: 72 MMHG | HEIGHT: 71 IN | WEIGHT: 315 LBS | HEART RATE: 76 BPM

## 2022-05-13 DIAGNOSIS — L73.9 FOLLICULITIS: Primary | ICD-10-CM

## 2022-05-13 PROBLEM — I95.9 HYPOTENSION: Status: RESOLVED | Noted: 2021-12-01 | Resolved: 2022-05-13

## 2022-05-13 PROCEDURE — 99304 1ST NF CARE SF/LOW MDM 25: CPT | Performed by: SURGERY

## 2022-05-13 NOTE — PROGRESS NOTES
Assessment/Plan:     Diagnoses and all orders for this visit:    Folliculitis      recurrent folliculitis of the neck crease  Three sites at present time which are bothering the patient  Will do excision in the office under local anesthesia    Will continue patient on his Pradaxa  Subjective:      Patient ID: Mahesh Knox is a 47 y o  male  Resident of [facility]  HPI   Patient is well known to me  He has had recurrent episodes of folliculitis and abscesses on his neck  At present time he is bothered by 3 separate sites of folliculitis on his neck crease  The following portions of the patient's history were reviewed and updated as appropriate: allergies, current medications, past family history, past medical history, past social history, past surgical history and problem list     Review of Systems    Morbid obesity, status post bariatric surgery  COPD  Atrial fibrillation  History of DVT and IVC filter  On chronic anticoagulation with Pradaxa    Objective:    /72   Pulse 76   Ht 5' 11" (1 803 m)   Wt (!) 224 kg (493 lb)   BMI 68 76 kg/m²      Physical Exam    There are 3 areas of folliculitis on the patient's neck crease  Two of these are on the left side and 1 is in the posterior midline

## 2022-05-25 ENCOUNTER — APPOINTMENT (EMERGENCY)
Dept: RADIOLOGY | Facility: HOSPITAL | Age: 52
End: 2022-05-25
Payer: COMMERCIAL

## 2022-05-25 ENCOUNTER — HOSPITAL ENCOUNTER (EMERGENCY)
Facility: HOSPITAL | Age: 52
Discharge: NURSING, REHAB OR LONG-TERM CARE | End: 2022-05-25
Attending: EMERGENCY MEDICINE
Payer: COMMERCIAL

## 2022-05-25 VITALS
SYSTOLIC BLOOD PRESSURE: 108 MMHG | OXYGEN SATURATION: 98 % | HEART RATE: 96 BPM | HEIGHT: 71 IN | BODY MASS INDEX: 44.1 KG/M2 | RESPIRATION RATE: 18 BRPM | DIASTOLIC BLOOD PRESSURE: 72 MMHG | WEIGHT: 315 LBS | TEMPERATURE: 97.5 F

## 2022-05-25 DIAGNOSIS — R07.89 ATYPICAL CHEST PAIN: Primary | ICD-10-CM

## 2022-05-25 LAB
ALBUMIN SERPL BCP-MCNC: 3.7 G/DL (ref 3.5–5)
ALP SERPL-CCNC: 80 U/L (ref 34–104)
ALT SERPL W P-5'-P-CCNC: 21 U/L (ref 7–52)
ANION GAP SERPL CALCULATED.3IONS-SCNC: 6 MMOL/L (ref 4–13)
AST SERPL W P-5'-P-CCNC: 21 U/L (ref 13–39)
ATRIAL RATE: 208 BPM
BASOPHILS # BLD AUTO: 0.03 THOUSANDS/ΜL (ref 0–0.1)
BASOPHILS NFR BLD AUTO: 0 % (ref 0–1)
BILIRUB SERPL-MCNC: 0.76 MG/DL (ref 0.2–1)
BNP SERPL-MCNC: 44 PG/ML (ref 0–100)
BUN SERPL-MCNC: 15 MG/DL (ref 5–25)
CALCIUM SERPL-MCNC: 9.5 MG/DL (ref 8.4–10.2)
CARDIAC TROPONIN I PNL SERPL HS: 3 NG/L
CHLORIDE SERPL-SCNC: 92 MMOL/L (ref 96–108)
CO2 SERPL-SCNC: 41 MMOL/L (ref 21–32)
CREAT SERPL-MCNC: 0.92 MG/DL (ref 0.6–1.3)
EOSINOPHIL # BLD AUTO: 0.16 THOUSAND/ΜL (ref 0–0.61)
EOSINOPHIL NFR BLD AUTO: 2 % (ref 0–6)
ERYTHROCYTE [DISTWIDTH] IN BLOOD BY AUTOMATED COUNT: 14.5 % (ref 11.6–15.1)
GFR SERPL CREATININE-BSD FRML MDRD: 95 ML/MIN/1.73SQ M
GLUCOSE SERPL-MCNC: 116 MG/DL (ref 65–140)
HCT VFR BLD AUTO: 41.3 % (ref 36.5–49.3)
HGB BLD-MCNC: 13.4 G/DL (ref 12–17)
IMM GRANULOCYTES # BLD AUTO: 0.03 THOUSAND/UL (ref 0–0.2)
IMM GRANULOCYTES NFR BLD AUTO: 0 % (ref 0–2)
LYMPHOCYTES # BLD AUTO: 1.23 THOUSANDS/ΜL (ref 0.6–4.47)
LYMPHOCYTES NFR BLD AUTO: 14 % (ref 14–44)
MCH RBC QN AUTO: 30.1 PG (ref 26.8–34.3)
MCHC RBC AUTO-ENTMCNC: 32.4 G/DL (ref 31.4–37.4)
MCV RBC AUTO: 93 FL (ref 82–98)
MONOCYTES # BLD AUTO: 0.73 THOUSAND/ΜL (ref 0.17–1.22)
MONOCYTES NFR BLD AUTO: 8 % (ref 4–12)
NEUTROPHILS # BLD AUTO: 6.72 THOUSANDS/ΜL (ref 1.85–7.62)
NEUTS SEG NFR BLD AUTO: 76 % (ref 43–75)
NRBC BLD AUTO-RTO: 0 /100 WBCS
PLATELET # BLD AUTO: 185 THOUSANDS/UL (ref 149–390)
PMV BLD AUTO: 10.6 FL (ref 8.9–12.7)
POTASSIUM SERPL-SCNC: 3.8 MMOL/L (ref 3.5–5.3)
PROT SERPL-MCNC: 7.6 G/DL (ref 6.4–8.4)
QRS AXIS: 41 DEGREES
QRSD INTERVAL: 88 MS
QT INTERVAL: 368 MS
QTC INTERVAL: 460 MS
RBC # BLD AUTO: 4.45 MILLION/UL (ref 3.88–5.62)
SODIUM SERPL-SCNC: 139 MMOL/L (ref 135–147)
T WAVE AXIS: 12 DEGREES
VENTRICULAR RATE: 94 BPM
WBC # BLD AUTO: 8.9 THOUSAND/UL (ref 4.31–10.16)

## 2022-05-25 PROCEDURE — 93010 ELECTROCARDIOGRAM REPORT: CPT | Performed by: INTERNAL MEDICINE

## 2022-05-25 PROCEDURE — 99285 EMERGENCY DEPT VISIT HI MDM: CPT

## 2022-05-25 PROCEDURE — 93005 ELECTROCARDIOGRAM TRACING: CPT

## 2022-05-25 PROCEDURE — 84484 ASSAY OF TROPONIN QUANT: CPT

## 2022-05-25 PROCEDURE — 71045 X-RAY EXAM CHEST 1 VIEW: CPT

## 2022-05-25 PROCEDURE — 96375 TX/PRO/DX INJ NEW DRUG ADDON: CPT

## 2022-05-25 PROCEDURE — 99285 EMERGENCY DEPT VISIT HI MDM: CPT | Performed by: EMERGENCY MEDICINE

## 2022-05-25 PROCEDURE — 85025 COMPLETE CBC W/AUTO DIFF WBC: CPT

## 2022-05-25 PROCEDURE — 83880 ASSAY OF NATRIURETIC PEPTIDE: CPT | Performed by: EMERGENCY MEDICINE

## 2022-05-25 PROCEDURE — 80053 COMPREHEN METABOLIC PANEL: CPT

## 2022-05-25 PROCEDURE — 36415 COLL VENOUS BLD VENIPUNCTURE: CPT

## 2022-05-25 PROCEDURE — 96374 THER/PROPH/DIAG INJ IV PUSH: CPT

## 2022-05-25 RX ORDER — KETOROLAC TROMETHAMINE 30 MG/ML
15 INJECTION, SOLUTION INTRAMUSCULAR; INTRAVENOUS ONCE
Status: COMPLETED | OUTPATIENT
Start: 2022-05-25 | End: 2022-05-25

## 2022-05-25 RX ORDER — METOPROLOL TARTRATE 5 MG/5ML
5 INJECTION INTRAVENOUS ONCE
Status: COMPLETED | OUTPATIENT
Start: 2022-05-25 | End: 2022-05-25

## 2022-05-25 RX ADMIN — KETOROLAC TROMETHAMINE 15 MG: 30 INJECTION, SOLUTION INTRAMUSCULAR at 13:36

## 2022-05-25 RX ADMIN — METOPROLOL TARTRATE 5 MG: 5 INJECTION INTRAVENOUS at 13:38

## 2022-05-25 NOTE — ED NOTES
Provider at bedside       James Daniel RN  05/25/22 3279
Pt transport scheduled for Adan Lowe RN  05/25/22 5443
[facility] called for transport back to facility       Dayron Almaraz RN  05/25/22 5743
English

## 2022-05-25 NOTE — ED PROVIDER NOTES
History  Chief Complaint   Patient presents with    Chest Pain     Started 5AM left anterior chest, sharp     HPI     81-year-old male with history of AFib with RVR on Pradaxa, history of lower extremity DVT for which he takes Pradaxa and has an IVC filter in place, chronic respiratory failure with hypoxia on 2 L of oxygen at baseline, CHF on torsemide with LVEF of 55% on 4/4/22, hypothyroidism, diabetes, obesity hypoventilation syndrome  Patient presents for evaluation of sharp pain to the left anterior chest   Started at 4:00 a m  Pipe Cruz Described as pleuritic and worse with palpating the area  Does not radiate  He has had chest pain like this in the past   He was given aspirin and nitroglycerin en route to the emergency department by EMS without any improvement in his pain  Reports shortness of breath mildly increased from his baseline  No abdominal pain, fevers, chills, nausea, vomiting, diarrhea, or difficulty urinating  Reports a cough productive of sputum without hemoptysis  No fevers or chills  Prior to Admission Medications   Prescriptions Last Dose Informant Patient Reported? Taking?    Cholecalciferol (VITAMIN D) 50 MCG (2000 UT) tablet  Outside Facility (Specify) Yes No   Sig: Take 2,000 Units by mouth daily   Potassium Chloride (KCL-20 PO)   Yes No   Sig: Take 20 mg by mouth in the morning   acetaminophen (TYLENOL) 500 mg tablet  Self Yes No   Sig: Take 1,000 mg by mouth every 6 (six) hours as needed for mild pain   albuterol (2 5 mg/3 mL) 0 083 % nebulizer solution  Outside Facility (Specify) Yes No   Sig: Take 2 5 mg by nebulization every 6 (six) hours as needed for wheezing or shortness of breath   aluminum-magnesium hydroxide-simethicone (MYLANTA) 200-200-20 mg/5 mL suspension   No No   Sig: Take 30 mL by mouth every 6 (six) hours as needed for indigestion or heartburn   ammonium lactate (LAC-HYDRIN) 12 % cream   No No   Sig: Apply topically 2 (two) times a day   buPROPion (FORFIVO XL) 450 MG 24 hr tablet   No No   Sig: Take 1 tablet (450 mg total) by mouth daily   budesonide-formoterol (SYMBICORT) 160-4 5 mcg/act inhaler   Yes No   Sig: Inhale 2 puffs 2 (two) times a day Rinse mouth after use     cyanocobalamin (VITAMIN B-12) 1000 MCG tablet  Outside Facility (Specify) Yes No   Sig: Take 1,000 mcg by mouth daily   dabigatran etexilate (PRADAXA) 150 mg capsu  Outside Facility (Specify) Yes No   Sig: Take 150 mg by mouth 2 (two) times a day   dicyclomine (BENTYL) 20 mg tablet   No No   Sig: Take 1 tablet (20 mg total) by mouth 4 (four) times a day (before meals and at bedtime)   docusate sodium (COLACE) 100 mg capsule   No No   Sig: Take 1 capsule (100 mg total) by mouth 2 (two) times a day   famotidine (PEPCID) 20 mg tablet   No No   Sig: Take 1 tablet (20 mg total) by mouth daily   hydrOXYzine pamoate (VISTARIL) 25 mg capsule   Yes No   Sig: Take 25 mg by mouth Three times daily as needed   ipratropium-albuterol (DUO-NEB) 0 5-2 5 mg/3 mL nebulizer solution  Outside Facility (Specify) Yes No   Sig: Take 3 mL by nebulization 4 (four) times a day   levothyroxine 25 mcg tablet  Outside Facility (Specify) Yes No   Sig: Take 25 mcg by mouth daily   metoclopramide (REGLAN) 10 mg tablet   No No   Sig: Take 1 tablet (10 mg total) by mouth 3 (three) times a day before meals   metoprolol tartrate (LOPRESSOR) 50 mg tablet   No No   Sig: Take 2 tablets (100 mg total) by mouth every 12 (twelve) hours   nystatin (MYCOSTATIN) cream   No No   Sig: Apply topically 2 (two) times a day   ondansetron (ZOFRAN) 4 mg tablet   No No   Sig: Take 1 tablet (4 mg total) by mouth every 8 (eight) hours as needed for nausea or vomiting   pantoprazole (PROTONIX) 20 mg tablet   No No   Sig: Take 2 tablets (40 mg total) by mouth daily   polyethylene glycol (MIRALAX) 17 g packet   No No   Sig: Take 17 g by mouth daily   sodium chloride (OCEAN) 0 65 % nasal spray   No No   Si spray into each nostril as needed for congestion spironolactone (ALDACTONE) 50 mg tablet  Outside Facility (Specify) Yes No   Sig: Take 50 mg by mouth daily   torsemide 40 MG TABS   No No   Sig: Take 40 mg by mouth 2 (two) times a day      Facility-Administered Medications: None       Past Medical History:   Diagnosis Date    Afib ([place] [address] )     Anemia     Anxiety     Cancer ([place] [address] )     Cardiac disease     Cellulitis     COPD (chronic obstructive pulmonary disease) ([place] [address] )     Depression     Diabetes mellitus ([place] [address] )     [facility]     GERD (gastroesophageal reflux disease)     HBP (high blood pressure)     Heart failure ([facility]     Hx of blood clots     Hypertension     Hypokalemia     Hypothyroid     Obesity     Psychiatric disorder        Past Surgical History:   Procedure Laterality Date    ABDOMINAL HERNIA REPAIR  05/2019    CHOLECYSTECTOMY      Lap    GASTRECTOMY SLEEVE LAPAROSCOPIC  08/2018    INCISION AND DRAINAGE OF WOUND Bilateral 12/1/2021    Procedure: INCISION AND DRAINAGE (I&D) HEAD/FACE;  Surgeon: Kenroy May MD;  Location:  MAIN OR;  Service: General    KNEE SURGERY Right     open wound, injury     LEG SURGERY Right 2009    US GUIDED VASCULAR ACCESS  8/6/2018       Family History   Problem Relation Age of Onset    Lung cancer Father     Diabetes Maternal Aunt     Heart attack Mother     Clotting disorder Mother     Hypertension Mother     Heart failure Mother     Diabetes Mother     Atrial fibrillation Mother     Sleep apnea Mother     Obesity Mother     Asthma Sister     Stroke Neg Hx     Anuerysm Neg Hx     Arrhythmia Neg Hx     Hyperlipidemia Neg Hx         pt unsure    Fainting Neg Hx      I have reviewed and agree with the history as documented      E-Cigarette/Vaping    E-Cigarette Use Never User      E-Cigarette/Vaping Substances    Nicotine No     THC No     CBD No     Flavoring No     Other No     Unknown No      Social History     Tobacco Use    Smoking status: Former Smoker     Packs/day: 1 00     Years: 15 00     Pack years: 15 00    Smokeless tobacco: Never Used    Tobacco comment: 1 5ppd x 23 years, quit 2014   Vaping Use    Vaping Use: Never used   Substance Use Topics    Alcohol use: Not Currently    Drug use: No       Review of Systems   Constitutional: Negative for chills and fever  HENT: Negative for congestion  Eyes: Negative for visual disturbance  Respiratory: Positive for cough and shortness of breath (Slightly worse than baseline)  Cardiovascular: Positive for chest pain  Negative for leg swelling  Gastrointestinal: Negative for abdominal pain, diarrhea, nausea and vomiting  Genitourinary: Negative for dysuria and frequency  Musculoskeletal: Negative for arthralgias, back pain, neck pain and neck stiffness  Skin: Negative for rash  Neurological: Negative for weakness, numbness and headaches  Psychiatric/Behavioral: Negative for agitation, behavioral problems and confusion  Physical Exam  Physical Exam  Constitutional:       General: He is not in acute distress  Appearance: He is well-developed  He is not diaphoretic  Comments: Morbidly obese   HENT:      Head: Normocephalic and atraumatic  Right Ear: External ear normal       Left Ear: External ear normal       Nose: Nose normal    Eyes:      Conjunctiva/sclera: Conjunctivae normal    Cardiovascular:      Rate and Rhythm: Regular rhythm  Tachycardia present  Pulses: Normal pulses  Heart sounds: Normal heart sounds  No murmur heard  No friction rub  No gallop  Comments: Heart rate between 95 and 105 while I am in the room, a regularly irregular  Pulmonary:      Effort: Pulmonary effort is normal  No respiratory distress  Breath sounds: No wheezing or rales  Comments: Nonlabored breathing on his home 2 L of oxygen, satting 98%    Difficult to auscultate breath sounds based on body habitus  Abdominal:      General: Bowel sounds are normal  There is no distension  Palpations: Abdomen is soft  Tenderness: There is no abdominal tenderness  There is no guarding  Hernia: A hernia (Ventral, easily reducible, no overlying skin changes, nontender) is present  Musculoskeletal:         General: No deformity  Normal range of motion  Cervical back: Normal range of motion and neck supple  Right lower leg: No edema  Left lower leg: No edema  Comments: No calf tenderness   Skin:     General: Skin is warm and dry  Neurological:      Mental Status: He is alert and oriented to person, place, and time  Motor: No abnormal muscle tone     Psychiatric:         Mood and Affect: Mood normal          Vital Signs  ED Triage Vitals [05/25/22 1312]   Temperature Pulse Respirations Blood Pressure SpO2   97 5 °F (36 4 °C) (!) 109 18 113/55 98 %      Temp Source Heart Rate Source Patient Position - Orthostatic VS BP Location FiO2 (%)   Oral Monitor Lying Right arm --      Pain Score       --           Vitals:    05/25/22 1345 05/25/22 1430 05/25/22 1520 05/25/22 1600   BP: 118/63 115/73 129/54 108/72   Pulse: 94 92 90 96   Patient Position - Orthostatic VS:             Visual Acuity      ED Medications  Medications   ketorolac (TORADOL) injection 15 mg (15 mg Intravenous Given 5/25/22 1336)   metoprolol (LOPRESSOR) injection 5 mg (5 mg Intravenous Given 5/25/22 1338)       Diagnostic Studies  Results Reviewed     Procedure Component Value Units Date/Time    B-Type Natriuretic Peptide(BNP), AN, CA, EA Camp[facility] Only [[id number]]  (Normal) Collected: 05/25/22 1324    Lab Status: Final result Specimen: Blood from Arm, Left Updated: 05/25/22 1449     BNP 44 pg/mL     HS Troponin 0hr (reflex protocol) [[id number]]  (Normal) Collected: 05/25/22 1324    Lab Status: Final result Specimen: Blood from Arm, Left Updated: 05/25/22 1402     hs TnI 0hr 3 ng/L     Comprehensive metabolic panel [[id number]]  (Abnormal) Collected: 05/25/22 1324    Lab Status: Final result Specimen: Blood from Arm, Left Updated: 05/25/22 1355     Sodium 139 mmol/L      Potassium 3 8 mmol/L      Chloride 92 mmol/L      CO2 41 mmol/L      ANION GAP 6 mmol/L      BUN 15 mg/dL      Creatinine 0 92 mg/dL      Glucose 116 mg/dL      Calcium 9 5 mg/dL      AST 21 U/L      ALT 21 U/L      Alkaline Phosphatase 80 U/L      Total Protein 7 6 g/dL      Albumin 3 7 g/dL      Total Bilirubin 0 76 mg/dL      eGFR 95 ml/min/1 73sq m     Narrative:      National Kidney Disease Foundation guidelines for Chronic Kidney Disease (CKD):     Stage 1 with normal or high GFR (GFR > 90 mL/min/1 73 square meters)    Stage 2 Mild CKD (GFR = 60-89 mL/min/1 73 square meters)    Stage 3A Moderate CKD (GFR = 45-59 mL/min/1 73 square meters)    Stage 3B Moderate CKD (GFR = 30-44 mL/min/1 73 square meters)    Stage 4 Severe CKD (GFR = 15-29 mL/min/1 73 square meters)    Stage 5 End Stage CKD (GFR <15 mL/min/1 73 square meters)  Note: GFR calculation is accurate only with a steady state creatinine    CBC and differential [[id number]]  (Abnormal) Collected: 05/25/22 1324    Lab Status: Final result Specimen: Blood from Arm, Left Updated: 05/25/22 1334     WBC 8 90 Thousand/uL      RBC 4 45 Million/uL      Hemoglobin 13 4 g/dL      Hematocrit 41 3 %      MCV 93 fL      MCH 30 1 pg      MCHC 32 4 g/dL      RDW 14 5 %      MPV 10 6 fL      Platelets 669 Thousands/uL      nRBC 0 /100 WBCs      Neutrophils Relative 76 %      Immat GRANS % 0 %      Lymphocytes Relative 14 %      Monocytes Relative 8 %      Eosinophils Relative 2 %      Basophils Relative 0 %      Neutrophils Absolute 6 72 Thousands/µL      Immature Grans Absolute 0 03 Thousand/uL      Lymphocytes Absolute 1 23 Thousands/µL      Monocytes Absolute 0 73 Thousand/µL      Eosinophils Absolute 0 16 Thousand/µL      Basophils Absolute 0 03 Thousands/µL                  XR chest 1 view portable   ED Interpretation by Jens Sharp MD (05/25 7311)   Similar to prior- no acute cardiopulmonary abnormalities  Final Result by Senait Landis MD (05/25 1532)         1  No acute cardiopulmonary disease  Workstation performed: [id number]                    Procedures  Procedures         ED Course  ED Course as of 05/25/22 1757   Wed May 25, 2022   1340 I personally interpreted the patient's EKG which reveals atrial fibrillation with ventricular rate of 94 beats per minute, normal axis, normal intervals, no ST segment deviation or new T-wave inversions, unchanged from most recent prior EKG  1516 Troponin 3  Chest pain is reproducible with palpation which would be atypical for cardiac pain  Pain has been present for greater than 3 hours so [facility] no indication for delta troponin  HEART score 3  Doubt ACS  1518 Patient has been compliant with his home Pradaxa  No calf swelling or tenderness  Pain is reproducible with palpation  Satting well on his home 2 L of oxygen  Doubt PE    1518 BNP within normal limits  CXR unchanged from prior  No increased O2 requirement  No evidence of decompensated CHF  Hypercarbia present on CMP at the pt's baseline  No encephalopathy  1521 HR   No persistent tachycardia  Remains in afib as per his baseline  No indication for admission at this time  Return precautions discussed  HEART Risk Score    Flowsheet Row Most Recent Value   Heart Score Risk Calculator    History 0 Filed at: 05/25/2022 1517   ECG 0 Filed at: 05/25/2022 1517   Age 1 Filed at: 05/25/2022 1517   Risk Factors 2 Filed at: 05/25/2022 1517   Troponin 0 Filed at: 05/25/2022 1517   HEART Score 3 Filed at: 05/25/2022 1517                                      MDM  Number of Diagnoses or Management Options  Atypical chest pain: new and requires workup  Diagnosis management comments: Please see ED course above for details of the Medical Decision Making            Amount and/or Complexity of Data Reviewed  Clinical lab tests: ordered and reviewed  Tests in the radiology section of CPT®: ordered and reviewed  Review and summarize past medical records: yes  Independent visualization of images, tracings, or specimens: yes    Patient Progress  Patient progress: stable      Disposition  Final diagnoses:   Atypical chest pain     Time reflects when diagnosis was documented in both MDM as applicable and the Disposition within this note     Time User Action Codes Description Comment    5/25/2022  3:25 PM Natacha Jensen [R07 9] Chest pain     5/25/2022  3:25 PM Patti Roers Remove [R07 9] Chest pain     5/25/2022  3:25 PM Patti Roers Add [R07 89] Atypical chest pain       ED Disposition     ED Disposition   Discharge    Condition   Stable    Date/Time   Wed May 25, 2022  3:25 PM    Comment   Mishel Rizvi discharge to home/self care  Follow-up Information     Follow up With Specialties Details Why Contact Info Additional Information    [facility] Emergency Medicine  As we discussed, return to the Emergency Department for worsening pain, trouble breathing, dizziness, or for new or concerning symptoms   [address] Emergency Department, [address], [place], [address] [facility], [address]          Discharge Medication List as of 5/25/2022  3:26 PM      CONTINUE these medications which have NOT CHANGED    Details   acetaminophen (TYLENOL) 500 mg tablet Take 1,000 mg by mouth every 6 (six) hours as needed for mild pain, Historical Med      albuterol (2 5 mg/3 mL) 0 083 % nebulizer solution Take 2 5 mg by nebulization every 6 (six) hours as needed for wheezing or shortness of breath, Historical Med      aluminum-magnesium hydroxide-simethicone (MYLANTA) 200-200-20 mg/5 mL suspension Take 30 mL by mouth every 6 (six) hours as needed for indigestion or heartburn, Starting Fri 3/18/2022, Normal ammonium lactate (LAC-HYDRIN) 12 % cream Apply topically 2 (two) times a day, Starting Mon 11/15/2021, No Print      budesonide-formoterol (SYMBICORT) 160-4 5 mcg/act inhaler Inhale 2 puffs 2 (two) times a day Rinse mouth after use , Historical Med      buPROPion (FORFIVO XL) 450 MG 24 hr tablet Take 1 tablet (450 mg total) by mouth daily, Starting Tue 11/16/2021, No Print      Cholecalciferol (VITAMIN D) 50 MCG (2000 UT) tablet Take 2,000 Units by mouth daily, Historical Med      cyanocobalamin (VITAMIN B-12) 1000 MCG tablet Take 1,000 mcg by mouth daily, Historical Med      dabigatran etexilate (PRADAXA) 150 mg capsu Take 150 mg by mouth 2 (two) times a day, Historical Med      dicyclomine (BENTYL) 20 mg tablet Take 1 tablet (20 mg total) by mouth 4 (four) times a day (before meals and at bedtime), Starting Fri 3/18/2022, Normal      docusate sodium (COLACE) 100 mg capsule Take 1 capsule (100 mg total) by mouth 2 (two) times a day, Starting Fri 3/18/2022, Normal      famotidine (PEPCID) 20 mg tablet Take 1 tablet (20 mg total) by mouth daily, Starting Sat 3/19/2022, Normal      hydrOXYzine pamoate (VISTARIL) 25 mg capsule Take 25 mg by mouth Three times daily as needed, Historical Med      ipratropium-albuterol (DUO-NEB) 0 5-2 5 mg/3 mL nebulizer solution Take 3 mL by nebulization 4 (four) times a day, Historical Med      levothyroxine 25 mcg tablet Take 25 mcg by mouth daily, Historical Med      metoclopramide (REGLAN) 10 mg tablet Take 1 tablet (10 mg total) by mouth 3 (three) times a day before meals, Starting Fri 3/18/2022, Normal      metoprolol tartrate (LOPRESSOR) 50 mg tablet Take 2 tablets (100 mg total) by mouth every 12 (twelve) hours, Starting Sun 4/10/2022, No Print      nystatin (MYCOSTATIN) cream Apply topically 2 (two) times a day, Starting Mon 11/15/2021, No Print      ondansetron (ZOFRAN) 4 mg tablet Take 1 tablet (4 mg total) by mouth every 8 (eight) hours as needed for nausea or vomiting, Starting Fri 3/18/2022, No Print      pantoprazole (PROTONIX) 20 mg tablet Take 2 tablets (40 mg total) by mouth daily, Starting Fri 3/18/2022, Normal      polyethylene glycol (MIRALAX) 17 g packet Take 17 g by mouth daily, Starting Sat 3/19/2022, Normal      Potassium Chloride (KCL-20 PO) Take 20 mg by mouth in the morning, Historical Med      sodium chloride (OCEAN) 0 65 % nasal spray 1 spray into each nostril as needed for congestion, Starting Mon 11/15/2021, No Print      spironolactone (ALDACTONE) 50 mg tablet Take 50 mg by mouth daily, Historical Med      torsemide 40 MG TABS Take 40 mg by mouth 2 (two) times a day, Starting Fri 3/18/2022, Normal             No discharge procedures on file      PDMP Review       Value Time User    PDMP Reviewed  Yes 3/14/2022  8:42 AM Eliezer Storm PA-C          ED Provider  Electronically Signed by           Carter Zamora MD  05/25/22 0613

## 2022-06-02 ENCOUNTER — PROCEDURE VISIT (OUTPATIENT)
Dept: SURGERY | Facility: CLINIC | Age: 52
End: 2022-06-02
Payer: COMMERCIAL

## 2022-06-02 VITALS
HEIGHT: 71 IN | SYSTOLIC BLOOD PRESSURE: 135 MMHG | OXYGEN SATURATION: 99 % | BODY MASS INDEX: 44.1 KG/M2 | WEIGHT: 315 LBS | DIASTOLIC BLOOD PRESSURE: 62 MMHG | HEART RATE: 83 BPM

## 2022-06-02 DIAGNOSIS — L73.9 FOLLICULITIS: Primary | ICD-10-CM

## 2022-06-02 PROCEDURE — 88305 TISSUE EXAM BY PATHOLOGIST: CPT | Performed by: PATHOLOGY

## 2022-06-02 PROCEDURE — 88342 IMHCHEM/IMCYTCHM 1ST ANTB: CPT | Performed by: PATHOLOGY

## 2022-06-02 PROCEDURE — 88341 IMHCHEM/IMCYTCHM EA ADD ANTB: CPT | Performed by: PATHOLOGY

## 2022-06-02 PROCEDURE — 11422 EXC H-F-NK-SP B9+MARG 1.1-2: CPT | Performed by: SURGERY

## 2022-06-02 PROCEDURE — 88312 SPECIAL STAINS GROUP 1: CPT | Performed by: PATHOLOGY

## 2022-06-02 PROCEDURE — 12042 INTMD RPR N-HF/GENIT2.6-7.5: CPT | Performed by: SURGERY

## 2022-06-02 RX ORDER — LORATADINE 10 MG/1
10 TABLET ORAL DAILY
COMMUNITY

## 2022-06-02 RX ORDER — OMEPRAZOLE 20 MG/1
40 CAPSULE, DELAYED RELEASE ORAL DAILY
COMMUNITY
Start: 2022-05-22 | End: 2022-08-03

## 2022-06-04 ENCOUNTER — HOSPITAL ENCOUNTER (INPATIENT)
Facility: HOSPITAL | Age: 52
LOS: 2 days | DRG: 247 | End: 2022-06-06
Attending: EMERGENCY MEDICINE | Admitting: INTERNAL MEDICINE
Payer: COMMERCIAL

## 2022-06-04 ENCOUNTER — APPOINTMENT (EMERGENCY)
Dept: CT IMAGING | Facility: HOSPITAL | Age: 52
DRG: 247 | End: 2022-06-04
Payer: COMMERCIAL

## 2022-06-04 DIAGNOSIS — K56.609 SBO (SMALL BOWEL OBSTRUCTION) (HCC): Primary | ICD-10-CM

## 2022-06-04 DIAGNOSIS — E66.01 MORBID OBESITY (HCC): ICD-10-CM

## 2022-06-04 PROBLEM — K43.9 VENTRAL HERNIA WITHOUT OBSTRUCTION OR GANGRENE: Status: RESOLVED | Noted: 2021-11-09 | Resolved: 2022-06-04

## 2022-06-04 LAB
ALBUMIN SERPL BCP-MCNC: 3.8 G/DL (ref 3.5–5)
ALP SERPL-CCNC: 73 U/L (ref 34–104)
ALT SERPL W P-5'-P-CCNC: 20 U/L (ref 7–52)
ANION GAP SERPL CALCULATED.3IONS-SCNC: 6 MMOL/L (ref 4–13)
APTT PPP: 21 SECONDS (ref 23–37)
APTT PPP: 33 SECONDS (ref 23–37)
APTT PPP: 33 SECONDS (ref 23–37)
AST SERPL W P-5'-P-CCNC: 29 U/L (ref 13–39)
BASOPHILS # BLD AUTO: 0.03 THOUSANDS/ΜL (ref 0–0.1)
BASOPHILS NFR BLD AUTO: 0 % (ref 0–1)
BILIRUB SERPL-MCNC: 0.6 MG/DL (ref 0.2–1)
BUN SERPL-MCNC: 14 MG/DL (ref 5–25)
CALCIUM SERPL-MCNC: 10 MG/DL (ref 8.4–10.2)
CHLORIDE SERPL-SCNC: 90 MMOL/L (ref 96–108)
CO2 SERPL-SCNC: 40 MMOL/L (ref 21–32)
CREAT SERPL-MCNC: 0.96 MG/DL (ref 0.6–1.3)
EOSINOPHIL # BLD AUTO: 0.12 THOUSAND/ΜL (ref 0–0.61)
EOSINOPHIL NFR BLD AUTO: 2 % (ref 0–6)
ERYTHROCYTE [DISTWIDTH] IN BLOOD BY AUTOMATED COUNT: 14.3 % (ref 11.6–15.1)
GFR SERPL CREATININE-BSD FRML MDRD: 91 ML/MIN/1.73SQ M
GLUCOSE SERPL-MCNC: 142 MG/DL (ref 65–140)
HCT VFR BLD AUTO: 41.3 % (ref 36.5–49.3)
HGB BLD-MCNC: 13.5 G/DL (ref 12–17)
IMM GRANULOCYTES # BLD AUTO: 0.03 THOUSAND/UL (ref 0–0.2)
IMM GRANULOCYTES NFR BLD AUTO: 0 % (ref 0–2)
INR PPP: 1.13 (ref 0.84–1.19)
LACTATE SERPL-SCNC: 1.3 MMOL/L (ref 0.5–2)
LIPASE SERPL-CCNC: 210 U/L (ref 11–82)
LYMPHOCYTES # BLD AUTO: 1.17 THOUSANDS/ΜL (ref 0.6–4.47)
LYMPHOCYTES NFR BLD AUTO: 17 % (ref 14–44)
MCH RBC QN AUTO: 29.9 PG (ref 26.8–34.3)
MCHC RBC AUTO-ENTMCNC: 32.7 G/DL (ref 31.4–37.4)
MCV RBC AUTO: 92 FL (ref 82–98)
MONOCYTES # BLD AUTO: 0.46 THOUSAND/ΜL (ref 0.17–1.22)
MONOCYTES NFR BLD AUTO: 7 % (ref 4–12)
NEUTROPHILS # BLD AUTO: 5.13 THOUSANDS/ΜL (ref 1.85–7.62)
NEUTS SEG NFR BLD AUTO: 74 % (ref 43–75)
NRBC BLD AUTO-RTO: 0 /100 WBCS
PLATELET # BLD AUTO: 223 THOUSANDS/UL (ref 149–390)
PMV BLD AUTO: 11 FL (ref 8.9–12.7)
POTASSIUM SERPL-SCNC: 4 MMOL/L (ref 3.5–5.3)
PROT SERPL-MCNC: 8 G/DL (ref 6.4–8.4)
PROTHROMBIN TIME: 14.4 SECONDS (ref 11.6–14.5)
RBC # BLD AUTO: 4.51 MILLION/UL (ref 3.88–5.62)
SODIUM SERPL-SCNC: 136 MMOL/L (ref 135–147)
WBC # BLD AUTO: 6.94 THOUSAND/UL (ref 4.31–10.16)

## 2022-06-04 PROCEDURE — 96375 TX/PRO/DX INJ NEW DRUG ADDON: CPT

## 2022-06-04 PROCEDURE — 94664 DEMO&/EVAL PT USE INHALER: CPT

## 2022-06-04 PROCEDURE — 94760 N-INVAS EAR/PLS OXIMETRY 1: CPT

## 2022-06-04 PROCEDURE — 74176 CT ABD & PELVIS W/O CONTRAST: CPT

## 2022-06-04 PROCEDURE — 96374 THER/PROPH/DIAG INJ IV PUSH: CPT

## 2022-06-04 PROCEDURE — 96376 TX/PRO/DX INJ SAME DRUG ADON: CPT

## 2022-06-04 PROCEDURE — 83605 ASSAY OF LACTIC ACID: CPT | Performed by: STUDENT IN AN ORGANIZED HEALTH CARE EDUCATION/TRAINING PROGRAM

## 2022-06-04 PROCEDURE — 99254 IP/OBS CNSLTJ NEW/EST MOD 60: CPT | Performed by: SURGERY

## 2022-06-04 PROCEDURE — 87081 CULTURE SCREEN ONLY: CPT | Performed by: INTERNAL MEDICINE

## 2022-06-04 PROCEDURE — 85730 THROMBOPLASTIN TIME PARTIAL: CPT | Performed by: PHYSICIAN ASSISTANT

## 2022-06-04 PROCEDURE — G1004 CDSM NDSC: HCPCS

## 2022-06-04 PROCEDURE — 94640 AIRWAY INHALATION TREATMENT: CPT

## 2022-06-04 PROCEDURE — 80053 COMPREHEN METABOLIC PANEL: CPT | Performed by: STUDENT IN AN ORGANIZED HEALTH CARE EDUCATION/TRAINING PROGRAM

## 2022-06-04 PROCEDURE — 99285 EMERGENCY DEPT VISIT HI MDM: CPT

## 2022-06-04 PROCEDURE — 85025 COMPLETE CBC W/AUTO DIFF WBC: CPT | Performed by: STUDENT IN AN ORGANIZED HEALTH CARE EDUCATION/TRAINING PROGRAM

## 2022-06-04 PROCEDURE — 99223 1ST HOSP IP/OBS HIGH 75: CPT | Performed by: INTERNAL MEDICINE

## 2022-06-04 PROCEDURE — 85730 THROMBOPLASTIN TIME PARTIAL: CPT | Performed by: INTERNAL MEDICINE

## 2022-06-04 PROCEDURE — 85610 PROTHROMBIN TIME: CPT | Performed by: PHYSICIAN ASSISTANT

## 2022-06-04 PROCEDURE — 99285 EMERGENCY DEPT VISIT HI MDM: CPT | Performed by: STUDENT IN AN ORGANIZED HEALTH CARE EDUCATION/TRAINING PROGRAM

## 2022-06-04 PROCEDURE — 36415 COLL VENOUS BLD VENIPUNCTURE: CPT | Performed by: STUDENT IN AN ORGANIZED HEALTH CARE EDUCATION/TRAINING PROGRAM

## 2022-06-04 PROCEDURE — 93005 ELECTROCARDIOGRAM TRACING: CPT

## 2022-06-04 PROCEDURE — 83690 ASSAY OF LIPASE: CPT | Performed by: STUDENT IN AN ORGANIZED HEALTH CARE EDUCATION/TRAINING PROGRAM

## 2022-06-04 RX ORDER — HEPARIN SODIUM 10000 [USP'U]/100ML
3-25 INJECTION, SOLUTION INTRAVENOUS
Status: DISCONTINUED | OUTPATIENT
Start: 2022-06-04 | End: 2022-06-06 | Stop reason: HOSPADM

## 2022-06-04 RX ORDER — MORPHINE SULFATE 4 MG/ML
4 INJECTION, SOLUTION INTRAMUSCULAR; INTRAVENOUS EVERY 4 HOURS PRN
Status: DISCONTINUED | OUTPATIENT
Start: 2022-06-04 | End: 2022-06-06 | Stop reason: HOSPADM

## 2022-06-04 RX ORDER — BUDESONIDE AND FORMOTEROL FUMARATE DIHYDRATE 160; 4.5 UG/1; UG/1
2 AEROSOL RESPIRATORY (INHALATION)
Status: DISCONTINUED | OUTPATIENT
Start: 2022-06-04 | End: 2022-06-06 | Stop reason: HOSPADM

## 2022-06-04 RX ORDER — NYSTATIN 100000 U/G
CREAM TOPICAL 2 TIMES DAILY
Status: DISCONTINUED | OUTPATIENT
Start: 2022-06-04 | End: 2022-06-06 | Stop reason: HOSPADM

## 2022-06-04 RX ORDER — HEPARIN SODIUM 1000 [USP'U]/ML
4000 INJECTION, SOLUTION INTRAVENOUS; SUBCUTANEOUS
Status: DISCONTINUED | OUTPATIENT
Start: 2022-06-04 | End: 2022-06-06 | Stop reason: HOSPADM

## 2022-06-04 RX ORDER — PROMETHAZINE HYDROCHLORIDE 25 MG/ML
25 INJECTION, SOLUTION INTRAMUSCULAR; INTRAVENOUS EVERY 6 HOURS PRN
Status: DISCONTINUED | OUTPATIENT
Start: 2022-06-04 | End: 2022-06-06 | Stop reason: HOSPADM

## 2022-06-04 RX ORDER — BUDESONIDE AND FORMOTEROL FUMARATE DIHYDRATE 160; 4.5 UG/1; UG/1
2 AEROSOL RESPIRATORY (INHALATION) 2 TIMES DAILY
Status: DISCONTINUED | OUTPATIENT
Start: 2022-06-04 | End: 2022-06-04

## 2022-06-04 RX ORDER — FLUTICASONE PROPIONATE 50 MCG
1 SPRAY, SUSPENSION (ML) NASAL DAILY
Status: DISCONTINUED | OUTPATIENT
Start: 2022-06-04 | End: 2022-06-06 | Stop reason: HOSPADM

## 2022-06-04 RX ORDER — METOPROLOL TARTRATE 5 MG/5ML
5 INJECTION INTRAVENOUS EVERY 6 HOURS PRN
Status: DISCONTINUED | OUTPATIENT
Start: 2022-06-04 | End: 2022-06-06 | Stop reason: HOSPADM

## 2022-06-04 RX ORDER — MORPHINE SULFATE 4 MG/ML
4 INJECTION, SOLUTION INTRAMUSCULAR; INTRAVENOUS ONCE
Status: COMPLETED | OUTPATIENT
Start: 2022-06-04 | End: 2022-06-04

## 2022-06-04 RX ORDER — ONDANSETRON 2 MG/ML
4 INJECTION INTRAMUSCULAR; INTRAVENOUS ONCE
Status: COMPLETED | OUTPATIENT
Start: 2022-06-04 | End: 2022-06-04

## 2022-06-04 RX ORDER — SODIUM CHLORIDE, SODIUM GLUCONATE, SODIUM ACETATE, POTASSIUM CHLORIDE, MAGNESIUM CHLORIDE, SODIUM PHOSPHATE, DIBASIC, AND POTASSIUM PHOSPHATE .53; .5; .37; .037; .03; .012; .00082 G/100ML; G/100ML; G/100ML; G/100ML; G/100ML; G/100ML; G/100ML
75 INJECTION, SOLUTION INTRAVENOUS CONTINUOUS
Status: DISPENSED | OUTPATIENT
Start: 2022-06-04 | End: 2022-06-04

## 2022-06-04 RX ORDER — ONDANSETRON 2 MG/ML
4 INJECTION INTRAMUSCULAR; INTRAVENOUS EVERY 4 HOURS PRN
Status: DISCONTINUED | OUTPATIENT
Start: 2022-06-04 | End: 2022-06-06 | Stop reason: HOSPADM

## 2022-06-04 RX ORDER — AMMONIUM LACTATE 12 G/100G
CREAM TOPICAL 2 TIMES DAILY
Status: DISCONTINUED | OUTPATIENT
Start: 2022-06-04 | End: 2022-06-06 | Stop reason: HOSPADM

## 2022-06-04 RX ORDER — ALBUTEROL SULFATE 2.5 MG/3ML
2.5 SOLUTION RESPIRATORY (INHALATION) EVERY 6 HOURS PRN
Status: DISCONTINUED | OUTPATIENT
Start: 2022-06-04 | End: 2022-06-06 | Stop reason: HOSPADM

## 2022-06-04 RX ORDER — HEPARIN SODIUM 1000 [USP'U]/ML
2000 INJECTION, SOLUTION INTRAVENOUS; SUBCUTANEOUS
Status: DISCONTINUED | OUTPATIENT
Start: 2022-06-04 | End: 2022-06-06 | Stop reason: HOSPADM

## 2022-06-04 RX ORDER — FAMOTIDINE 10 MG/ML
20 INJECTION, SOLUTION INTRAVENOUS
Status: DISCONTINUED | OUTPATIENT
Start: 2022-06-04 | End: 2022-06-06 | Stop reason: HOSPADM

## 2022-06-04 RX ORDER — HEPARIN SODIUM 1000 [USP'U]/ML
4000 INJECTION, SOLUTION INTRAVENOUS; SUBCUTANEOUS ONCE
Status: COMPLETED | OUTPATIENT
Start: 2022-06-04 | End: 2022-06-04

## 2022-06-04 RX ADMIN — Medication: at 08:32

## 2022-06-04 RX ADMIN — ONDANSETRON 4 MG: 2 INJECTION INTRAMUSCULAR; INTRAVENOUS at 02:03

## 2022-06-04 RX ADMIN — MORPHINE SULFATE 4 MG: 4 INJECTION INTRAVENOUS at 01:06

## 2022-06-04 RX ADMIN — BUDESONIDE AND FORMOTEROL FUMARATE DIHYDRATE 2 PUFF: 160; 4.5 AEROSOL RESPIRATORY (INHALATION) at 08:50

## 2022-06-04 RX ADMIN — ALBUTEROL SULFATE 2.5 MG: 2.5 SOLUTION RESPIRATORY (INHALATION) at 19:24

## 2022-06-04 RX ADMIN — HEPARIN SODIUM 4000 UNITS: 1000 INJECTION INTRAVENOUS; SUBCUTANEOUS at 14:58

## 2022-06-04 RX ADMIN — SODIUM CHLORIDE, SODIUM GLUCONATE, SODIUM ACETATE, POTASSIUM CHLORIDE, MAGNESIUM CHLORIDE, SODIUM PHOSPHATE, DIBASIC, AND POTASSIUM PHOSPHATE 75 ML/HR: .53; .5; .37; .037; .03; .012; .00082 INJECTION, SOLUTION INTRAVENOUS at 13:38

## 2022-06-04 RX ADMIN — HEPARIN SODIUM 4000 UNITS: 1000 INJECTION INTRAVENOUS; SUBCUTANEOUS at 23:05

## 2022-06-04 RX ADMIN — NYSTATIN: 100000 CREAM TOPICAL at 17:32

## 2022-06-04 RX ADMIN — ONDANSETRON 4 MG: 2 INJECTION INTRAMUSCULAR; INTRAVENOUS at 06:35

## 2022-06-04 RX ADMIN — ONDANSETRON 4 MG: 2 INJECTION INTRAMUSCULAR; INTRAVENOUS at 20:32

## 2022-06-04 RX ADMIN — SODIUM CHLORIDE, SODIUM GLUCONATE, SODIUM ACETATE, POTASSIUM CHLORIDE, MAGNESIUM CHLORIDE, SODIUM PHOSPHATE, DIBASIC, AND POTASSIUM PHOSPHATE 75 ML/HR: .53; .5; .37; .037; .03; .012; .00082 INJECTION, SOLUTION INTRAVENOUS at 06:02

## 2022-06-04 RX ADMIN — HEPARIN SODIUM 4000 UNITS: 1000 INJECTION INTRAVENOUS; SUBCUTANEOUS at 07:35

## 2022-06-04 RX ADMIN — BUDESONIDE AND FORMOTEROL FUMARATE DIHYDRATE 2 PUFF: 160; 4.5 AEROSOL RESPIRATORY (INHALATION) at 19:16

## 2022-06-04 RX ADMIN — ONDANSETRON 4 MG: 2 INJECTION INTRAMUSCULAR; INTRAVENOUS at 13:39

## 2022-06-04 RX ADMIN — NYSTATIN: 100000 CREAM TOPICAL at 08:32

## 2022-06-04 RX ADMIN — MORPHINE SULFATE 2 MG: 2 INJECTION, SOLUTION INTRAMUSCULAR; INTRAVENOUS at 11:50

## 2022-06-04 RX ADMIN — MORPHINE SULFATE 2 MG: 2 INJECTION, SOLUTION INTRAMUSCULAR; INTRAVENOUS at 04:16

## 2022-06-04 RX ADMIN — Medication: at 17:32

## 2022-06-04 RX ADMIN — FAMOTIDINE 20 MG: 10 INJECTION, SOLUTION INTRAVENOUS at 08:26

## 2022-06-04 RX ADMIN — MORPHINE SULFATE 4 MG: 4 INJECTION INTRAVENOUS at 20:19

## 2022-06-04 RX ADMIN — MORPHINE SULFATE 4 MG: 4 INJECTION INTRAVENOUS at 15:54

## 2022-06-04 RX ADMIN — ONDANSETRON 4 MG: 2 INJECTION INTRAMUSCULAR; INTRAVENOUS at 01:05

## 2022-06-04 RX ADMIN — MORPHINE SULFATE 2 MG: 2 INJECTION, SOLUTION INTRAMUSCULAR; INTRAVENOUS at 06:35

## 2022-06-04 RX ADMIN — FLUTICASONE PROPIONATE 1 SPRAY: 50 SPRAY, METERED NASAL at 08:26

## 2022-06-04 RX ADMIN — PROMETHAZINE HYDROCHLORIDE 25 MG: 25 INJECTION INTRAMUSCULAR; INTRAVENOUS at 10:10

## 2022-06-04 RX ADMIN — HEPARIN SODIUM AND DEXTROSE 11.1 UNITS/KG/HR: 10000; 5 INJECTION INTRAVENOUS at 07:36

## 2022-06-04 NOTE — ED PROVIDER NOTES
History  Chief Complaint   Patient presents with    Abdominal Pain     Arrives via EMS from [facility] at [facility] for abdominal pain and vomiting that has been going on all day  Has had tylenol and zofran without relief  Patient is a 56-year-old male presents emergency department today with concern for abdominal pain nausea vomiting x1 day  Patient states his symptoms began earlier today have been progressively worsening  He admits to severe pain in his abdomen which he states is diffuse  Admits to multiple episodes of vomiting since, unsure exactly how many, states appears as undigested food and stomach contents, denies presence of blood or bile  Admits to 1 episode of diarrhea earlier this morning but has not had a since denies any chest pain shortness of breath, lightheadedness or dizziness, pain with urination, neck or back pain, fevers  Patient admits to prior abdominal surgeries including hernia repair and revision, and cholecystectomy  Admits to history of SBO and states this feels similar  Prior to Admission Medications   Prescriptions Last Dose Informant Patient Reported? Taking?    Cholecalciferol (VITAMIN D) 50 MCG (2000 UT) tablet  Outside Facility (Specify) Yes No   Sig: Take 2,000 Units by mouth daily   Potassium Chloride (KCL-20 PO)   Yes No   Sig: Take 20 mg by mouth in the morning   acetaminophen (TYLENOL) 500 mg tablet  Self Yes No   Sig: Take 1,000 mg by mouth every 6 (six) hours as needed for mild pain   albuterol (2 5 mg/3 mL) 0 083 % nebulizer solution  Outside Facility (Specify) Yes No   Sig: Take 2 5 mg by nebulization every 6 (six) hours as needed for wheezing or shortness of breath   aluminum-magnesium hydroxide-simethicone (MYLANTA) 200-200-20 mg/5 mL suspension   No No   Sig: Take 30 mL by mouth every 6 (six) hours as needed for indigestion or heartburn   ammonium lactate (LAC-HYDRIN) 12 % cream   No No   Sig: Apply topically 2 (two) times a day   buPROPion (FORFIVO XL) 450 MG 24 hr tablet   No No   Sig: Take 1 tablet (450 mg total) by mouth daily   budesonide-formoterol (SYMBICORT) 160-4 5 mcg/act inhaler   Yes No   Sig: Inhale 2 puffs 2 (two) times a day Rinse mouth after use     cyanocobalamin (VITAMIN B-12) 1000 MCG tablet  Outside Facility (Specify) Yes No   Sig: Take 1,000 mcg by mouth daily   dabigatran etexilate (PRADAXA) 150 mg capsu  Outside Facility (Specify) Yes No   Sig: Take 150 mg by mouth 2 (two) times a day   dicyclomine (BENTYL) 20 mg tablet   No No   Sig: Take 1 tablet (20 mg total) by mouth 4 (four) times a day (before meals and at bedtime)   docusate sodium (COLACE) 100 mg capsule   No No   Sig: Take 1 capsule (100 mg total) by mouth 2 (two) times a day   famotidine (PEPCID) 20 mg tablet   No No   Sig: Take 1 tablet (20 mg total) by mouth daily   ipratropium-albuterol (DUO-NEB) 0 5-2 5 mg/3 mL nebulizer solution  Outside Facility (Specify) Yes No   Sig: Take 3 mL by nebulization 4 (four) times a day   levothyroxine 25 mcg tablet  Outside Facility (Specify) Yes No   Sig: Take 25 mcg by mouth daily   loratadine (CLARITIN) 10 mg tablet   Yes No   Sig: Take 10 mg by mouth daily   metoprolol tartrate (LOPRESSOR) 50 mg tablet   No No   Sig: Take 2 tablets (100 mg total) by mouth every 12 (twelve) hours   nystatin (MYCOSTATIN) cream   No No   Sig: Apply topically 2 (two) times a day   omeprazole (PriLOSEC) 20 mg delayed release capsule   Yes No   ondansetron (ZOFRAN) 4 mg tablet   No No   Sig: Take 1 tablet (4 mg total) by mouth every 8 (eight) hours as needed for nausea or vomiting   polyethylene glycol (MIRALAX) 17 g packet   No No   Sig: Take 17 g by mouth daily   sodium chloride (OCEAN) 0 65 % nasal spray   No No   Si spray into each nostril as needed for congestion   spironolactone (ALDACTONE) 50 mg tablet  Outside Facility (Specify) Yes No   Sig: Take 50 mg by mouth daily   torsemide 40 MG TABS   No No   Sig: Take 40 mg by mouth 2 (two) times a day Facility-Administered Medications: None       Past Medical History:   Diagnosis Date    Afib ([facility] )     Anemia     Anxiety     Cancer ([place] [address] )     Cardiac disease     Cellulitis     COPD (chronic obstructive pulmonary disease) ([place] [address] )     Depression     Diabetes mellitus ([place] [address] )     DVT (deep venous thrombosis) (HCC)     GERD (gastroesophageal reflux disease)     HBP (high blood pressure)     Heart failure (HCC)     Hx of blood clots     Hypertension     Hypokalemia     Hypothyroid     Obesity     Psychiatric disorder        Past Surgical History:   Procedure Laterality Date    ABDOMINAL HERNIA REPAIR  05/2019    CHOLECYSTECTOMY      Lap    GASTRECTOMY SLEEVE LAPAROSCOPIC  08/2018    INCISION AND DRAINAGE OF WOUND Bilateral 12/1/2021    Procedure: INCISION AND DRAINAGE (I&D) HEAD/FACE;  Surgeon: Meka Jessica MD;  Location:  MAIN OR;  Service: General    KNEE SURGERY Right     open wound, injury     LEG SURGERY Right 2009   [place] [address] VASCULAR ACCESS  8/6/2018       Family History   Problem Relation Age of Onset    Lung cancer Father     Diabetes Maternal Aunt     Heart attack Mother     Clotting disorder Mother     Hypertension Mother     Heart failure Mother     Diabetes Mother     Atrial fibrillation Mother     Sleep apnea Mother     Obesity Mother     Asthma Sister     Stroke Neg Hx     Anuerysm Neg Hx     Arrhythmia Neg Hx     Hyperlipidemia Neg Hx         pt unsure    Fainting Neg Hx      I have reviewed and agree with the history as documented      E-Cigarette/Vaping    E-Cigarette Use Never User      E-Cigarette/Vaping Substances    Nicotine No     THC No     CBD No     Flavoring No     Other No     Unknown No      Social History     Tobacco Use    Smoking status: Former Smoker     Packs/day: 1 00     Years: 15 00     Pack years: 15 00    Smokeless tobacco: Never Used    Tobacco comment: 1 5ppd x 23 years, quit 2014   Vaping Use    Vaping Use: Never used Substance Use Topics    Alcohol use: Not Currently    Drug use: No       Review of Systems   Constitutional: Negative for chills and fever  Respiratory: Negative for cough, chest tightness and shortness of breath  Gastrointestinal: Positive for abdominal pain, diarrhea, nausea and vomiting  Genitourinary: Negative for dysuria, frequency and urgency  Musculoskeletal: Negative for back pain and neck pain  Neurological: Negative for dizziness, weakness, light-headedness, numbness and headaches  All other systems reviewed and are negative  Physical Exam  Physical Exam  Vitals and nursing note reviewed  Constitutional:       Appearance: Normal appearance  He is well-developed  He is morbidly obese  HENT:      Head: Normocephalic and atraumatic  Mouth/Throat:      Mouth: Mucous membranes are moist       Pharynx: Oropharynx is clear  Eyes:      Conjunctiva/sclera: Conjunctivae normal    Cardiovascular:      Rate and Rhythm: Normal rate and regular rhythm  Heart sounds: Normal heart sounds  No murmur heard  Pulmonary:      Effort: Pulmonary effort is normal  No respiratory distress  Breath sounds: Normal breath sounds  No decreased breath sounds or wheezing  Abdominal:      General: Bowel sounds are normal       Palpations: Abdomen is soft  Tenderness: There is generalized abdominal tenderness  Musculoskeletal:      Cervical back: Neck supple  Skin:     General: Skin is warm and dry  Neurological:      General: No focal deficit present  Mental Status: He is alert and oriented to person, place, and time  Psychiatric:         Attention and Perception: Attention normal          Mood and Affect: Mood normal          Speech: Speech normal          Behavior: Behavior normal  Behavior is cooperative  Thought Content:  Thought content normal          Judgment: Judgment normal          Vital Signs  ED Triage Vitals [06/04/22 0010]   Temperature Pulse Respirations Blood Pressure SpO2   (!) 97 3 °F (36 3 °C) 60 18 138/59 100 %      Temp Source Heart Rate Source Patient Position - Orthostatic VS BP Location FiO2 (%)   Oral Monitor Lying Left arm --      Pain Score       8           Vitals:    06/04/22 0010 06/04/22 0238 06/04/22 0620 06/04/22 0738   BP: 138/59 112/76 144/77 124/74   Pulse: 60 87 (!) 112 86   Patient Position - Orthostatic VS: Lying Lying Lying Lying         Visual Acuity      ED Medications  Medications   albuterol inhalation solution 2 5 mg (has no administration in time range)   ammonium lactate (LAC-HYDRIN) 12 % cream (has no administration in time range)   Famotidine (PF) (PEPCID) injection 20 mg (has no administration in time range)   fluticasone (FLONASE) 50 mcg/act nasal spray 1 spray (has no administration in time range)   metoprolol (LOPRESSOR) injection 5 mg (has no administration in time range)   nystatin (MYCOSTATIN) cream (has no administration in time range)   promethazine (PHENERGAN) injection 25 mg (has no administration in time range)   morphine injection 2 mg (2 mg Intravenous Given 6/4/22 0635)   morphine (PF) 4 mg/mL injection 4 mg (has no administration in time range)   acetaminophen (TYLENOL) rectal suppository 325 mg (has no administration in time range)   multi-electrolyte (PLASMALYTE-A/ISOLYTE-S PH 7 4) IV solution (75 mL/hr Intravenous New Bag 6/4/22 0602)   ondansetron (ZOFRAN) injection 4 mg (4 mg Intravenous Given 6/4/22 0635)   heparin (porcine) 25,000 units in D5W 250 mL infusion (premix) (11 1 Units/kg/hr × 90 kg (Order-Specific) Intravenous New Bag 6/4/22 0736)   heparin (porcine) injection 4,000 Units (has no administration in time range)   heparin (porcine) injection 2,000 Units (has no administration in time range)   budesonide-formoterol (SYMBICORT) 160-4 5 mcg/act inhaler 2 puff (has no administration in time range)   ondansetron (ZOFRAN) injection 4 mg (4 mg Intravenous Given 6/4/22 0105)   morphine (PF) 4 mg/mL injection 4 mg (4 mg Intravenous Given 6/4/22 0106)   ondansetron (ZOFRAN) injection 4 mg (4 mg Intravenous Given 6/4/22 0203)   morphine injection 2 mg (2 mg Intravenous Given 6/4/22 0416)   barium (READI-CAT 2) suspension 900 mL (900 mL Oral Given 6/4/22 0409)   heparin (porcine) injection 4,000 Units (4,000 Units Intravenous Given 6/4/22 5226)       Diagnostic Studies  Results Reviewed     Procedure Component Value Units Date/Time    Lipase [[id number]]  (Abnormal) Collected: 06/04/22 0056    Lab Status: Final result Specimen: Blood from Arm, Left Updated: 06/04/22 0144     Lipase 210 u/L     Comprehensive metabolic panel [[id number]]  (Abnormal) Collected: 06/04/22 0056    Lab Status: Final result Specimen: Blood from Arm, Left Updated: 06/04/22 0144     Sodium 136 mmol/L      Potassium 4 0 mmol/L      Chloride 90 mmol/L      CO2 40 mmol/L      ANION GAP 6 mmol/L      BUN 14 mg/dL      Creatinine 0 96 mg/dL      Glucose 142 mg/dL      Calcium 10 0 mg/dL      AST 29 U/L      ALT 20 U/L      Alkaline Phosphatase 73 U/L      Total Protein 8 0 g/dL      Albumin 3 8 g/dL      Total Bilirubin 0 60 mg/dL      eGFR 91 ml/min/1 73sq m     Narrative:      [place] guidelines for Chronic Kidney Disease (CKD):     Stage 1 with normal or high GFR (GFR > 90 mL/min/1 73 square meters)    Stage 2 Mild CKD (GFR = 60-89 mL/min/1 73 square meters)    Stage 3A Moderate CKD (GFR = 45-59 mL/min/1 73 square meters)    Stage 3B Moderate CKD (GFR = 30-44 mL/min/1 73 square meters)    Stage 4 Severe CKD (GFR = 15-29 mL/min/1 73 square meters)    Stage 5 End Stage CKD (GFR <15 mL/min/1 73 square meters)  Note: GFR calculation is accurate only with a steady state creatinine    Lactic acid [[id number]]  (Normal) Collected: 06/04/22 0056    Lab Status: Final result Specimen: Blood from Arm, Left Updated: 06/04/22 0130     LACTIC ACID 1 3 mmol/L     Narrative:      Result may be elevated if tourniquet was used during collection  CBC and differential [[id number]] Collected: 06/04/22 0056    Lab Status: Final result Specimen: Blood from Arm, Left Updated: 06/04/22 0117     WBC 6 94 Thousand/uL      RBC 4 51 Million/uL      Hemoglobin 13 5 g/dL      Hematocrit 41 3 %      MCV 92 fL      MCH 29 9 pg      MCHC 32 7 g/dL      RDW 14 3 %      MPV 11 0 fL      Platelets 435 Thousands/uL      nRBC 0 /100 WBCs      Neutrophils Relative 74 %      Immat GRANS % 0 %      Lymphocytes Relative 17 %      Monocytes Relative 7 %      Eosinophils Relative 2 %      Basophils Relative 0 %      Neutrophils Absolute 5 13 Thousands/µL      Immature Grans Absolute 0 03 Thousand/uL      Lymphocytes Absolute 1 17 Thousands/µL      Monocytes Absolute 0 46 Thousand/µL      Eosinophils Absolute 0 12 Thousand/µL      Basophils Absolute 0 03 Thousands/µL                  CT abdomen pelvis wo contrast   Final Result by Venancio Stacy MD (06/04 1291)      Findings consistent with small bowel obstruction at the proximal jejunum with transition point at the left upper abdomen  Findings discussed with Dr Omid Morelos at [address] [facility] AM, 6/4/2022            Workstation performed: [id number]         XR abdomen obstruction series    (Results Pending)              Procedures  Procedures         ED Course                                             MDM  Number of Diagnoses or Management Options  SBO (small bowel obstruction) ([facility] )  Diagnosis management comments: Patient is a 52-year-old male presents emergency department today from long-term care facility with concern of uncontrolled nausea vomiting and abdominal pain  Examination showed diffuse abdominal tenderness in all quadrants of the abdomen was otherwise unremarkable  Differential includes small-bowel obstruction, gastroenteritis, appendicitis  Laboratory evaluation showed slightly elevated lipase at 210 But was otherwise not concerning  Patient medicated with Zofran and morphine for symptomatic relief  CT p o  Contrast was ordered due to patient's history of bariatric surgery and with main concern of small-bowel obstruction  CT examination confirms small bowel obstruction at the proximal jejunum as well as a abdominal wall hernia  Spoke to Dr Garcia of general surgery who recommended placing NG tube, keeping patient NPO and giving IV fluids with admission to Medicine for surgical consult in the morning  Spoke to hospitalist, RADHA Acosta, who agreed to admit patient to the care of Genaro Vallejo for diagnosis of small-bowel obstruction and to see General surgery in the morning  Patient verbalized understanding agreed to plan of care, all patient's questions were answered, patient was stable at admission  Amount and/or Complexity of Data Reviewed  Clinical lab tests: ordered and reviewed  Tests in the radiology section of CPT®: ordered and reviewed    Patient Progress  Patient progress: stable      Disposition  Final diagnoses:   SBO (small bowel obstruction) ([facility] )     Time reflects when diagnosis was documented in both MDM as applicable and the Disposition within this note     Time User Action Codes Description Comment    6/4/2022  4:30 AM Shola Ahn Add [K56 609] SBO (small bowel obstruction) ([facility] )     6/4/2022  4:48 AM Gumaro Coker Add [E66 01] Morbid obesity [facility])       ED Disposition     ED Disposition   Admit    Condition   Stable    Date/Time   Sat Jun 4, 2022  4:30 AM    Comment   Case was discussed with hospitalist (ДМИТРИЙ Coker) and the patient's admission status was agreed to be Admission Status: inpatient status to the service of Dr Alexandr Dietrich              Follow-up Information    None         Current Discharge Medication List      CONTINUE these medications which have NOT CHANGED    Details   acetaminophen (TYLENOL) 500 mg tablet Take 1,000 mg by mouth every 6 (six) hours as needed for mild pain      albuterol (2 5 mg/3 mL) 0 083 % nebulizer solution Take 2 5 mg by nebulization every 6 (six) hours as needed for wheezing or shortness of breath      aluminum-magnesium hydroxide-simethicone (MYLANTA) 200-200-20 mg/5 mL suspension Take 30 mL by mouth every 6 (six) hours as needed for indigestion or heartburn  Qty: 355 mL, Refills: 0    Associated Diagnoses: Esophagitis      ammonium lactate (LAC-HYDRIN) 12 % cream Apply topically 2 (two) times a day  Qty: 385 g, Refills: 0    Associated Diagnoses: Morbid obesity ([facility])      budesonide-formoterol (SYMBICORT) 160-4 5 mcg/act inhaler Inhale 2 puffs 2 (two) times a day Rinse mouth after use        buPROPion (FORFIVO XL) 450 MG 24 hr tablet Take 1 tablet (450 mg total) by mouth daily  Refills: 0    Associated Diagnoses: CHF (congestive heart failure) ([facility])      Cholecalciferol (VITAMIN D) 50 MCG (2000 UT) tablet Take 2,000 Units by mouth daily      cyanocobalamin (VITAMIN B-12) 1000 MCG tablet Take 1,000 mcg by mouth daily      dabigatran etexilate (PRADAXA) 150 mg capsu Take 150 mg by mouth 2 (two) times a day      dicyclomine (BENTYL) 20 mg tablet Take 1 tablet (20 mg total) by mouth 4 (four) times a day (before meals and at bedtime)  Qty: 120 tablet, Refills: 0    Associated Diagnoses: Intractable abdominal pain      docusate sodium (COLACE) 100 mg capsule Take 1 capsule (100 mg total) by mouth 2 (two) times a day  Qty: 30 capsule, Refills: 0    Associated Diagnoses: Constipation      famotidine (PEPCID) 20 mg tablet Take 1 tablet (20 mg total) by mouth daily  Qty: 30 tablet, Refills: 0    Associated Diagnoses: Esophagitis      ipratropium-albuterol (DUO-NEB) 0 5-2 5 mg/3 mL nebulizer solution Take 3 mL by nebulization 4 (four) times a day      levothyroxine 25 mcg tablet Take 25 mcg by mouth daily      loratadine (CLARITIN) 10 mg tablet Take 10 mg by mouth daily      metoprolol tartrate (LOPRESSOR) 50 mg tablet Take 2 tablets (100 mg total) by mouth every 12 (twelve) hours  Refills: 0    Associated Diagnoses: CHF (congestive heart failure) ([facility])      nystatin (MYCOSTATIN) cream Apply topically 2 (two) times a day  Qty: 30 g, Refills: 0    Associated Diagnoses: Morbid obesity (HCC)      omeprazole (PriLOSEC) 20 mg delayed release capsule       ondansetron (ZOFRAN) 4 mg tablet Take 1 tablet (4 mg total) by mouth every 8 (eight) hours as needed for nausea or vomiting  Qty: 20 tablet, Refills: 0    Associated Diagnoses: Nausea and vomiting; Esophagitis      polyethylene glycol (MIRALAX) 17 g packet Take 17 g by mouth daily  Qty: 10 each, Refills: 0    Associated Diagnoses: Constipation      Potassium Chloride (KCL-20 PO) Take 20 mg by mouth in the morning      sodium chloride (OCEAN) 0 65 % nasal spray 1 spray into each nostril as needed for congestion  Refills: 0    Associated Diagnoses: Nasal congestion      spironolactone (ALDACTONE) 50 mg tablet Take 50 mg by mouth daily      torsemide 40 MG TABS Take 40 mg by mouth 2 (two) times a day  Qty: 30 tablet, Refills: 0    Associated Diagnoses: Acute on chronic diastolic congestive heart failure ([facility] )             No discharge procedures on file      PDMP Review       Value Time User    PDMP Reviewed  Yes 3/14/2022  8:42 AM Bhargavi Shepard PA-C          ED Provider  Electronically Signed by           Lauren Centeno PA-C  06/04/22 6477

## 2022-06-04 NOTE — ED NOTES
Pt reports he still has nausea, and abd pain 7/10   Provider made aware     Agueda García RN  06/04/22 0157

## 2022-06-04 NOTE — ED NOTES
Provider at bedside       Ricardo Kerr, [facility]  06/04/22 9439

## 2022-06-04 NOTE — ED NOTES
Pt denies nausea states intermittent, started drinking CT contrast at this time   Provider aware     Radene Severance, RN  06/04/22 5798

## 2022-06-04 NOTE — ASSESSMENT & PLAN NOTE
Wt Readings from Last 3 Encounters:   06/04/22 (!) 231 kg (509 lb)   06/02/22 (!) 232 kg (512 lb)   05/25/22 (!) 233 kg (512 lb 12 6 oz)     · Dry weight around 510-520# although difficult to assess fluid status because of morbid obesity  Daily weights  · 4/4/22 echo reviewed  · Maintenance IVF very gentle for this patient while he is NPO status with SBO  · Holding home demadex at this time  Once he is off NPO status would resume demadex & spirnolactone right away    · Continue other GDMT

## 2022-06-04 NOTE — H&P
[place] 46  H&P- Kenney Alert 1970, 47 y o  male MRN: [id number]  Unit/Bed#: -01 Encounter: [id number]  Primary Care Provider: Jam Hernandez MD   Date and time admitted to hospital: 6/4/2022 12:08 AM    * SBO  Assessment & Plan  · CT imaging reviewed  · NGT placed in the ED, suction  · NPO  · Pain management, symptom management  · Maintenance IVF  · Gen surg consult  · AM abdominal series XR    History of DVT (deep vein thrombosis)  Assessment & Plan  · Noted, AC with Pradaxa in op setting, IV heparin while IP in case of need for OR in SBO and to avoid PO route in acute SBO  · Once he is taking po, can resume home AC    Chronic diastolic heart failure [facility])  Assessment & Plan  Wt Readings from Last 3 Encounters:   06/04/22 (!) 231 kg (509 lb)   06/02/22 (!) 232 kg (512 lb)   05/25/22 (!) 233 kg (512 lb 12 6 oz)     · Dry weight around 510-520# although difficult to assess fluid status because of morbid obesity  Daily weights  · 4/4/22 echo reviewed  · Maintenance IVF very gentle for this patient while he is NPO status with SBO  · Holding home demadex at this time  Once he is off NPO status would resume demadex & spirnolactone right away  · Continue other GDMT    Obesity hypoventilation syndrome/FILIPPO  Assessment & Plan  · 4L O2 NC at baseline  · Continue daily inhalers  · CPAP QHS    Morbid obesity (HCC)  Assessment & Plan  · Noted  · Prior h/o gastric bypass  · Lives at SNF for assistance with ADLs    A-fib [facility])  Assessment & Plan  · Rate controlled on admission  Will use tele to monitor rate while he is NPO  · While NPO use metoprolol 5 mg IV q6h prn HTN & tachycardia  · AC with Pradaxa, will use IV heparin while IP in case of need for OR as well as to avoid PO route in present SBO    VTE Pharmacologic Prophylaxis: VTE Score: 5 High Risk (Score >/= 5) - Pharmacological DVT Prophylaxis Ordered: heparin drip  Sequential Compression Devices Ordered    Code Status: Level 1 - full code  Discussion with family: patient, day team can call family members as desired later in the morning  Anticipated Length of Stay: Patient will be admitted on an inpatient basis with an anticipated length of stay of greater than 2 midnights secondary to gen surg consult, ng maintenance with improvement of SBO, serial imaging and symptom management  Total Time for Visit, including Counseling / Coordination of Care: 45 minutes Greater than 50% of this total time spent on direct patient counseling and coordination of care  Chief Complaint: abd pain x 1 day    History of Present Illness:  So Garcias is a 47 y o  male with a PMH of COPD with chronic O2 needs 4L NC, OHS, obesity, H/o DVT, Afib AC with pradaxa, H/o CHF, venous insufficiency, h/o ventral hernia with abd surgical intervention in the distant past, HTN who presents with abd pain x 1 day  Started in the morning when he woke up on 6/3/22, generalized in the abd with worsening intensity despite tylenol  Worsening distention in the abdomen per pt  Last BM was am of 6/3/22  He's been passing flatus during the day 6/3/22  No diarrhea or constipation per patient  There has been constant n/v, vomiting up even sips of liquid despite zofran use  Patient will be admitted for SBO conservative management and gen surg consult  Review of Systems:  Review of Systems   Constitutional: Negative  HENT: Negative  Eyes: Negative  Respiratory: Negative  Cardiovascular: Negative  Gastrointestinal: Positive for abdominal pain, nausea and vomiting  Endocrine: Negative  Genitourinary: Negative  Musculoskeletal: Negative  Skin: Negative  Allergic/Immunologic: Positive for immunocompromised state  Copd   Neurological: Negative  Hematological: Bruises/bleeds easily  On pradaxa   Psychiatric/Behavioral: Negative          Past Medical and Surgical History:   Past Medical History:   Diagnosis Date    Afib [facility] )     Anemia  Anxiety     [facility]     Cardiac disease     Cellulitis     COPD [facility]chronic obstructive pulmonary disease) [facility])     Depression     Diabetes mellitus [facility]     DVT (deep venous thrombosis) ([facility]     GERD (gastroesophageal reflux disease)     HBP (high blood pressure)     Heart failure ([facility])     Hx of blood clots     Hypertension     Hypokalemia     Hypothyroid     Obesity     Psychiatric disorder        Past Surgical History:   Procedure Laterality Date    ABDOMINAL HERNIA REPAIR  05/2019    CHOLECYSTECTOMY      Lap    GASTRECTOMY SLEEVE LAPAROSCOPIC  08/2018    INCISION AND DRAINAGE OF WOUND Bilateral 12/1/2021    Procedure: INCISION AND DRAINAGE (I&D) HEAD/FACE;  Surgeon: Chicho Sanchez MD;  Location:  MAIN OR;  Service: General    KNEE SURGERY Right     open wound, injury     LEG SURGERY Right 2009    US GUIDED VASCULAR ACCESS  8/6/2018       Meds/Allergies:  Prior to Admission medications    Medication Sig Start Date End Date Taking? Authorizing Provider   acetaminophen (TYLENOL) 500 mg tablet Take 1,000 mg by mouth every 6 (six) hours as needed for mild pain    Desiree Maldonado MD   albuterol (2 5 mg/3 mL) 0 083 % nebulizer solution Take 2 5 mg by nebulization every 6 (six) hours as needed for wheezing or shortness of breath    Historical Provider, MD   aluminum-magnesium hydroxide-simethicone (MYLANTA) 200-200-20 mg/5 mL suspension Take 30 mL by mouth every 6 (six) hours as needed for indigestion or heartburn 3/18/22   Asael Yap MD   ammonium lactate (LAC-HYDRIN) 12 % cream Apply topically 2 (two) times a day 11/15/21   Qian Saini MD   budesonide-formoterol [facility]) 160-4 5 mcg/act inhaler Inhale 2 puffs 2 (two) times a day Rinse mouth after use      Historical Provider, MD   buPROPion (FORFIVO XL) 450 MG 24 hr tablet Take 1 tablet (450 mg total) by mouth daily 11/16/21   Qian Saini MD   Cholecalciferol (VITAMIN D) 50 MCG (2000 UT) tablet Take 2,000 Units by mouth daily    Historical Provider, MD   cyanocobalamin (VITAMIN B-12) 1000 MCG tablet Take 1,000 mcg by mouth daily    Historical Provider, MD   dabigatran etexilate (PRADAXA) 150 mg capsu Take 150 mg by mouth 2 (two) times a day    Historical Provider, MD   dicyclomine (BENTYL) 20 mg tablet Take 1 tablet (20 mg total) by mouth 4 (four) times a day (before meals and at bedtime) 3/18/22   Palmira Diamond MD   docusate sodium (COLACE) 100 mg capsule Take 1 capsule (100 mg total) by mouth 2 (two) times a day 3/18/22   Palmira Diamond MD   famotidine (PEPCID) 20 mg tablet Take 1 tablet (20 mg total) by mouth daily 3/19/22   Palmira Diamond MD   ipratropium-albuterol (DUO-NEB) 0 5-2 5 mg/3 mL nebulizer solution Take 3 mL by nebulization 4 (four) times a day    Historical Provider, MD   levothyroxine 25 mcg tablet Take 25 mcg by mouth daily    Historical Provider, MD   loratadine (CLARITIN) 10 mg tablet Take 10 mg by mouth daily    Historical Provider, MD   metoprolol tartrate (LOPRESSOR) 50 mg tablet Take 2 tablets (100 mg total) by mouth every 12 (twelve) hours 4/10/22   Radha Carpenter PA-C   nystatin (MYCOSTATIN) cream Apply topically 2 (two) times a day 11/15/21   Rosemary Cedeno MD   omeprazole (PriLOSEC) 20 mg delayed release capsule  5/22/22   Historical Provider, MD   ondansetron (ZOFRAN) 4 mg tablet Take 1 tablet (4 mg total) by mouth every 8 (eight) hours as needed for nausea or vomiting 3/18/22   Palmira Diamond MD   polyethylene glycol (MIRALAX) 17 g packet Take 17 g by mouth daily 3/19/22   Palmira Diamond MD   Potassium Chloride (KCL-20 PO) Take 20 mg by mouth in the morning    Historical Provider, MD   sodium chloride (OCEAN) 0 65 % nasal spray 1 spray into each nostril as needed for congestion 11/15/21   Rosemary Cedeno MD   spironolactone (ALDACTONE) 50 mg tablet Take 50 mg by mouth daily    Historical Provider, MD   torsemide 40 MG TABS Take 40 mg by mouth 2 (two) times a day 3/18/22   Shar Gan MD     I have reveiwed home medications using records provided by [facility]  Allergies: Allergies   Allergen Reactions    Penicillins Hives       Social History:  Marital Status: Single   Occupation: none  Patient Pre-hospital Living Situation: Jack Juarez: сергей [facility]  Patient Pre-hospital Level of Mobility: non-ambulatory/bed bound  Patient Pre-hospital Diet Restrictions: carb controlled lo fat  Substance Use History:   Social History     Substance and Sexual Activity   Alcohol Use Not Currently     Social History     Tobacco Use   Smoking Status Former Smoker    Packs/day: 1 00    Years: 15 00    Pack years: 15 00   Smokeless Tobacco Never Used   Tobacco Comment    1 5ppd x 23 years, quit 2014     Social History     Substance and Sexual Activity   Drug Use No       Family History:  Family History   Problem Relation Age of Onset    Lung cancer Father     Diabetes Maternal Aunt     Heart attack Mother     Clotting disorder Mother     Hypertension Mother     Heart failure Mother     Diabetes Mother     Atrial fibrillation Mother     Sleep apnea Mother     Obesity Mother     Asthma Sister     Stroke Neg Hx     Anuerysm Neg Hx     Arrhythmia Neg Hx     Hyperlipidemia Neg Hx         pt unsure    Fainting Neg Hx        Physical Exam:     Vitals:   Blood Pressure: 112/76 (06/04/22 0238)  Pulse: 87 (06/04/22 0238)  Temperature: (!) 97 3 °F (36 3 °C) (06/04/22 0010)  Temp Source: Oral (06/04/22 0010)  Respirations: 18 (06/04/22 0238)  Height: 5' 11" (180 3 cm) (06/04/22 0010)  Weight - Scale: (!) 231 kg (509 lb) (06/04/22 0010)  SpO2: 96 % (06/04/22 0238)    Physical Exam  Vitals and nursing note reviewed  Constitutional:       General: He is not in acute distress  Appearance: He is obese  He is not ill-appearing, toxic-appearing or diaphoretic  HENT:      Head: Normocephalic and atraumatic        Nose: Nose normal  Comments: Ng in place no bleeding at nares there is output     Mouth/Throat:      Mouth: Mucous membranes are moist    Eyes:      Extraocular Movements: Extraocular movements intact  Conjunctiva/sclera: Conjunctivae normal    Neck:      Comments: Thick neck, posterior bandage from the recent I&D of abscess is c/d/i no surrounding erythema or edema  Cardiovascular:      Rate and Rhythm: Tachycardia present  Rhythm irregular  Pulses: Normal pulses  Heart sounds: Normal heart sounds  Pulmonary:      Breath sounds: Normal breath sounds  Comments: 4L O2 in place   Abdominal:      General: There is distension  Tenderness: There is abdominal tenderness  There is no guarding or rebound  Hernia: A hernia is present  Musculoskeletal:         General: Swelling present  Normal range of motion  Skin:     General: Skin is warm and dry  Comments: Venous stasis dermatitis bl le   Neurological:      General: No focal deficit present  Mental Status: He is oriented to person, place, and time     Psychiatric:         Mood and Affect: Mood normal          Behavior: Behavior normal           Additional Data:     Lab Results:  Results from last 7 days   Lab Units 06/04/22  0056   WBC Thousand/uL 6 94   HEMOGLOBIN g/dL 13 5   HEMATOCRIT % 41 3   PLATELETS Thousands/uL 223   NEUTROS PCT % 74   LYMPHS PCT % 17   MONOS PCT % 7   EOS PCT % 2     Results from last 7 days   Lab Units 06/04/22  0056   SODIUM mmol/L 136   POTASSIUM mmol/L 4 0   CHLORIDE mmol/L 90*   CO2 mmol/L 40*   BUN mg/dL 14   CREATININE mg/dL 0 96   ANION GAP mmol/L 6   CALCIUM mg/dL 10 0   ALBUMIN g/dL 3 8   TOTAL BILIRUBIN mg/dL 0 60   ALK PHOS U/L 73   ALT U/L 20   AST U/L 29   GLUCOSE RANDOM mg/dL 142*                 Results from last 7 days   Lab Units 06/04/22  0056   LACTIC ACID mmol/L 1 3       Imaging: Reviewed radiology reports from this admission including: abdominal/pelvic CT  CT abdomen pelvis wo contrast   Final Result by Venancio Stacy MD (06/04 0422)      Findings consistent with small bowel obstruction at the proximal jejunum with transition point at the left upper abdomen  Findings discussed with Dr Omid Morelos at [address] [facility] AM, 6/4/2022            Workstation performed: [id number]         XR abdomen obstruction series    (Results Pending)       EKG and Other Studies Reviewed on Admission:   · EKG: No EKG obtained  ** Please Note: This note has been constructed using a voice recognition system   **

## 2022-06-04 NOTE — ASSESSMENT & PLAN NOTE
· Rate controlled on admission   Will use tele to monitor rate while he is NPO  · While NPO use metoprolol 5 mg IV q6h prn HTN & tachycardia  · AC with Pradaxa, will use IV heparin while IP in case of need for OR as well as to avoid PO route in present SBO

## 2022-06-04 NOTE — ASSESSMENT & PLAN NOTE
· CT imaging reviewed  · NGT placed in the ED, suction  · NPO  · Pain management, symptom management  · Maintenance IVF  · Gen surg consult  · AM abdominal series XR

## 2022-06-04 NOTE — ASSESSMENT & PLAN NOTE
Lab Results   Component Value Date    HGBA1C 5 4 03/21/2022       No results for input(s): POCGLU in the last 72 hours      Blood Sugar Average: Last 72 hrs:    · No home meds

## 2022-06-04 NOTE — ED NOTES
Pt noted a hard iv access stick this RN attempted iv access x2 unsuccessful, awaiting for another RN to attempt       Guille Daily, [facility]  06/04/22 4618

## 2022-06-04 NOTE — CONSULTS
Consultation - General Surgery   Jamee Boykin 47 y o  male MRN: [id number]  Unit/Bed#: -01 Encounter: [id number]    Assessment/Plan     Assessment:  I reviewed his CT scan of the abdomen  He has got the transition point quite proximal in the jejunum  Patient has multiple comorbidities  His abdominal exam is benign  At present will treat him conservatively with NG tube suction and IV fluids  I do not think that the hernia is the cause of obstruction  I reviewed his labs as well  His white cell count was normal without any left shift  His lactic acid was normal   Plan:  Continue NG tube suction  Continue IV fluids  Continue IV heparin  Management of fluids as per cardiac status  X-ray abdomen obstruction series tomorrow morning  History of Present Illness   History, ROS and PFSH unobtainable from any source due to   HPI:  Jamee Boykin is a 47 y o  male who who suffers from atrial fibrillation, hypertension, chronic diastolic heart failure, severe morbid obesity, history of DVT on anticoagulation was transferred to the hospital due to nausea and vomiting  It started yesterday  It was accompanied by abdominal pain  Patient says that after NG tube was inserted his abdomen pain is gone  He says his last bowel movement was yesterday  He has passed flatus this morning  No nausea at present  Patient has history of laparoscopic sleeve gastrectomy  He lost about 200 lb but gained all the weight back  He also has a history of incisional hernia repair  He has history of exploratory laparotomy for bowel obstruction  He also suffers from sleep apnea  Consults    Review of Systems   Constitutional: Negative  HENT: Negative  Eyes: Negative  Respiratory: Negative  Cardiovascular: Negative  Gastrointestinal: Positive for abdominal distention  Negative for abdominal pain, anal bleeding, blood in stool, nausea, rectal pain and vomiting  Endocrine: Negative  Genitourinary: Negative  Musculoskeletal: Negative  Skin: Negative  Allergic/Immunologic: Negative  Neurological: Negative  Hematological: Negative  Psychiatric/Behavioral: Negative          Historical Information   Past Medical History:   Diagnosis Date    Afib [place] [address] )     Anemia     Anxiety     [place] ([place] [address] )     Cardiac disease     Cellulitis     COPD (chronic obstructive pulmonary disease) ([place] [address] )     Depression     Diabetes mellitus ([place] [address] )     DVT (deep venous thrombosis) (HCC)     GERD (gastroesophageal reflux disease)     HBP (high blood pressure)     Heart failure (HCC)     Hx of blood clots     Hypertension     Hypokalemia     Hypothyroid     Obesity     Psychiatric disorder      Past Surgical History:   Procedure Laterality Date    ABDOMINAL HERNIA REPAIR  05/2019    CHOLECYSTECTOMY      Lap    GASTRECTOMY SLEEVE LAPAROSCOPIC  08/2018    INCISION AND DRAINAGE OF WOUND Bilateral 12/1/2021    Procedure: INCISION AND DRAINAGE (I&D) HEAD/FACE;  Surgeon: Shai Chance MD;  Location:  MAIN OR;  Service: General    KNEE SURGERY Right     open wound, injury     LEG SURGERY Right 2009    US GUIDED VASCULAR ACCESS  8/6/2018     Social History   Social History     Substance and Sexual Activity   Alcohol Use Not Currently     Social History     Substance and Sexual Activity   Drug Use No     E-Cigarette/Vaping    E-Cigarette Use Never User      E-Cigarette/Vaping Substances    Nicotine No     THC No     CBD No     Flavoring No     Other No     Unknown No      Social History     Tobacco Use   Smoking Status Former Smoker    Packs/day: 1 00    Years: 15 00    Pack years: 15 00   Smokeless Tobacco Never Used   Tobacco Comment    1 5ppd x 23 years, quit 2014     Family History:   Family History   Problem Relation Age of Onset    Lung cancer Father     Diabetes Maternal Aunt     Heart attack Mother     Clotting disorder Mother     Hypertension Mother     Heart failure Mother     Diabetes Mother  Atrial fibrillation Mother     Sleep apnea Mother     Obesity Mother     Asthma Sister     Stroke Neg Hx     Anuerysm Neg Hx     Arrhythmia Neg Hx     Hyperlipidemia Neg Hx         pt unsure    Fainting Neg Hx        Meds/Allergies   all current active meds have been reviewed, current meds:   Current Facility-Administered Medications   Medication Dose Route Frequency    acetaminophen (TYLENOL) rectal suppository 325 mg  325 mg Rectal Q4H PRN    albuterol inhalation solution 2 5 mg  2 5 mg Nebulization Q6H PRN    ammonium lactate (LAC-HYDRIN) 12 % cream   Topical BID    budesonide-formoterol (SYMBICORT) 160-4 5 mcg/act inhaler 2 puff  2 puff Inhalation BID    Famotidine (PF) (PEPCID) injection 20 mg  20 mg Intravenous Q24H MARY    fluticasone (FLONASE) 50 mcg/act nasal spray 1 spray  1 spray Each Nare Daily    heparin (porcine) 25,000 units in D5W 250 mL infusion (premix)  3-20 Units/kg/hr (Order-Specific) Intravenous Titrated    heparin (porcine) injection 2,000 Units  2,000 Units Intravenous Q1H PRN    heparin (porcine) injection 4,000 Units  4,000 Units Intravenous Q1H PRN    metoprolol (LOPRESSOR) injection 5 mg  5 mg Intravenous Q6H PRN    morphine (PF) 4 mg/mL injection 4 mg  4 mg Intravenous Q4H PRN    morphine injection 2 mg  2 mg Intravenous Q4H PRN    multi-electrolyte (PLASMALYTE-A/ISOLYTE-S PH 7 4) IV solution  75 mL/hr Intravenous Continuous    nystatin (MYCOSTATIN) cream   Topical BID    ondansetron (ZOFRAN) injection 4 mg  4 mg Intravenous Q4H PRN    promethazine (PHENERGAN) injection 25 mg  25 mg Intravenous Q6H PRN    and PTA meds:   Prior to Admission Medications   Prescriptions Last Dose Informant Patient Reported? Taking?    Cholecalciferol (VITAMIN D) 50 MCG (2000 UT) tablet  Outside Facility (Specify) Yes No   Sig: Take 2,000 Units by mouth daily   Potassium Chloride (KCL-20 PO)   Yes No   Sig: Take 20 mg by mouth in the morning   acetaminophen (TYLENOL) 500 mg tablet Self Yes No   Sig: Take 1,000 mg by mouth every 6 (six) hours as needed for mild pain   albuterol (2 5 mg/3 mL) 0 083 % nebulizer solution  Outside Facility (Specify) Yes No   Sig: Take 2 5 mg by nebulization every 6 (six) hours as needed for wheezing or shortness of breath   aluminum-magnesium hydroxide-simethicone (MYLANTA) 200-200-20 mg/5 mL suspension   No No   Sig: Take 30 mL by mouth every 6 (six) hours as needed for indigestion or heartburn   ammonium lactate (LAC-HYDRIN) 12 % cream   No No   Sig: Apply topically 2 (two) times a day   buPROPion (FORFIVO XL) 450 MG 24 hr tablet   No No   Sig: Take 1 tablet (450 mg total) by mouth daily   budesonide-formoterol (SYMBICORT) 160-4 5 mcg/act inhaler   Yes No   Sig: Inhale 2 puffs 2 (two) times a day Rinse mouth after use     cyanocobalamin (VITAMIN B-12) 1000 MCG tablet  Outside Facility (Specify) Yes No   Sig: Take 1,000 mcg by mouth daily   dabigatran etexilate (PRADAXA) 150 mg capsu  Outside Facility (Specify) Yes No   Sig: Take 150 mg by mouth 2 (two) times a day   dicyclomine (BENTYL) 20 mg tablet   No No   Sig: Take 1 tablet (20 mg total) by mouth 4 (four) times a day (before meals and at bedtime)   docusate sodium (COLACE) 100 mg capsule   No No   Sig: Take 1 capsule (100 mg total) by mouth 2 (two) times a day   famotidine (PEPCID) 20 mg tablet   No No   Sig: Take 1 tablet (20 mg total) by mouth daily   ipratropium-albuterol (DUO-NEB) 0 5-2 5 mg/3 mL nebulizer solution  Outside Facility (Specify) Yes No   Sig: Take 3 mL by nebulization 4 (four) times a day   levothyroxine 25 mcg tablet  Outside Facility (Specify) Yes No   Sig: Take 25 mcg by mouth daily   loratadine (CLARITIN) 10 mg tablet   Yes No   Sig: Take 10 mg by mouth daily   metoprolol tartrate (LOPRESSOR) 50 mg tablet   No No   Sig: Take 2 tablets (100 mg total) by mouth every 12 (twelve) hours   nystatin (MYCOSTATIN) cream   No No   Sig: Apply topically 2 (two) times a day   omeprazole (PriLOSEC) 20 mg delayed release capsule   Yes No   ondansetron (ZOFRAN) 4 mg tablet   No No   Sig: Take 1 tablet (4 mg total) by mouth every 8 (eight) hours as needed for nausea or vomiting   polyethylene glycol (MIRALAX) 17 g packet   No No   Sig: Take 17 g by mouth daily   sodium chloride (OCEAN) 0 65 % nasal spray   No No   Si spray into each nostril as needed for congestion   spironolactone (ALDACTONE) 50 mg tablet  Outside Facility (Specify) Yes No   Sig: Take 50 mg by mouth daily   torsemide 40 MG TABS   No No   Sig: Take 40 mg by mouth 2 (two) times a day      Facility-Administered Medications: None     Allergies   Allergen Reactions    Penicillins Hives       Objective   First Vitals:   Blood Pressure: 138/59 (22)  Pulse: 60 (22)  Temperature: (!) 97 3 °F (36 3 °C) (220)  Temp Source: Oral (22)  Respirations: 18 (22)  Height: 5' 11" (180 3 cm) (22 0010)  Weight - Scale: (!) 231 kg (509 lb) (22 0010)  SpO2: 100 % (22)    Current Vitals:   Blood Pressure: 124/74 (2238)  Pulse: 86 (22)  Temperature: (!) 96 4 °F (35 8 °C) (2238)  Temp Source: Tympanic (22)  Respirations: 16 (2238)  Height: 5' 11" (180 3 cm) (22 0010)  Weight - Scale: (!) 227 kg (500 lb 10 6 oz) (22 1038)  SpO2: 100 % (22 0853)      Intake/Output Summary (Last 24 hours) at 2022 1052  Last data filed at 2022 0842  Gross per 24 hour   Intake --   Output 550 ml   Net -550 ml       Invasive Devices  Report    Peripheral Intravenous Line  Duration           Peripheral IV 22 Distal;Right;Upper;Ventral (anterior) Arm <1 day    Peripheral IV 22 Left Antecubital <1 day          Drain  Duration           NG/OG/Enteral Tube Nasogastric 16 Fr Right nare <1 day                Physical Exam  Vitals reviewed  Constitutional:       Appearance: He is obese  HENT:      Head: Normocephalic and atraumatic  Cardiovascular:      Comments: AFib  Pulmonary:      Effort: Pulmonary effort is normal       Breath sounds: Normal breath sounds  Abdominal:      Palpations: Abdomen is soft  Comments: He has a large incisional hernia  It is soft, nontender, reducible  No rebound  No guarding  Musculoskeletal:      Cervical back: Normal range of motion  Skin:     General: Skin is warm  Neurological:      General: No focal deficit present  Lab Results:   I have personally reviewed pertinent lab results  , CBC:   Lab Results   Component Value Date    WBC 6 94 06/04/2022    HGB 13 5 06/04/2022    HCT 41 3 06/04/2022    MCV 92 06/04/2022     06/04/2022    MCH 29 9 06/04/2022    MCHC 32 7 06/04/2022    RDW 14 3 06/04/2022    MPV 11 0 06/04/2022    NRBC 0 06/04/2022   , CMP:   Lab Results   Component Value Date    SODIUM 136 06/04/2022    K 4 0 06/04/2022    CL 90 (L) 06/04/2022    CO2 40 (H) 06/04/2022    BUN 14 06/04/2022    CREATININE 0 96 06/04/2022    CALCIUM 10 0 06/04/2022    AST 29 06/04/2022    ALT 20 06/04/2022    ALKPHOS 73 06/04/2022    EGFR 91 06/04/2022   , Coagulation:   Lab Results   Component Value Date    INR 1 13 06/04/2022   , Urinalysis: No results found for: Vinayak Vuong, SPECGRAV, PHUR, LEUKOCYTESUR, NITRITE, PROTEINUA, GLUCOSEU, KETONESU, BILIRUBINUR, BLOODU, Amylase: No results found for: AMYLASE, Lipase:   Lab Results   Component Value Date    LIPASE 210 (H) 06/04/2022     Imaging: I have personally reviewed pertinent reports  and I have personally reviewed pertinent films in PACS  EKG, Pathology, and Other Studies: I have personally reviewed pertinent reports  and I have personally reviewed pertinent films in PACS    Counseling / Coordination of Care  Total floor / unit time spent today 90 minutes  Greater than 50% of total time was spent with the patient and / or family counseling and / or coordination of care    A description of the counseling / coordination of care: 20

## 2022-06-04 NOTE — ASSESSMENT & PLAN NOTE
· Noted, AC with Pradaxa in op setting, IV heparin while IP in case of need for OR in SBO and to avoid PO route in acute SBO  · Once he is taking po, can resume home [facility], [place], operated by [facility]

## 2022-06-04 NOTE — PLAN OF CARE
Problem: GASTROINTESTINAL - ADULT  Goal: Minimal or absence of nausea and/or vomiting  Description: INTERVENTIONS:  - Administer IV fluids if ordered to ensure adequate hydration  - Maintain NPO status until nausea and vomiting are resolved  - Nasogastric tube if ordered  - Administer ordered antiemetic medications as needed  - Provide nonpharmacologic comfort measures as appropriate  - Advance diet as tolerated, if ordered  - Consider nutrition services referral to assist patient with adequate nutrition and appropriate food choices  Outcome: Progressing  Goal: Oral mucous membranes remain intact  Description: INTERVENTIONS  - Assess oral mucosa and hygiene practices  - Implement preventative oral hygiene regimen  - Implement oral medicated treatments as ordered  - Initiate Nutrition services referral as needed  Outcome: Progressing

## 2022-06-05 ENCOUNTER — APPOINTMENT (INPATIENT)
Dept: RADIOLOGY | Facility: HOSPITAL | Age: 52
DRG: 247 | End: 2022-06-05
Payer: COMMERCIAL

## 2022-06-05 LAB
ALBUMIN SERPL BCP-MCNC: 3.7 G/DL (ref 3.5–5)
ALP SERPL-CCNC: 74 U/L (ref 34–104)
ALT SERPL W P-5'-P-CCNC: 18 U/L (ref 7–52)
ANION GAP SERPL CALCULATED.3IONS-SCNC: 7 MMOL/L (ref 4–13)
APTT PPP: 49 SECONDS (ref 23–37)
APTT PPP: 57 SECONDS (ref 23–37)
AST SERPL W P-5'-P-CCNC: 17 U/L (ref 13–39)
BASOPHILS # BLD AUTO: 0.04 THOUSANDS/ΜL (ref 0–0.1)
BASOPHILS NFR BLD AUTO: 1 % (ref 0–1)
BILIRUB SERPL-MCNC: 0.54 MG/DL (ref 0.2–1)
BUN SERPL-MCNC: 15 MG/DL (ref 5–25)
CALCIUM SERPL-MCNC: 9.8 MG/DL (ref 8.4–10.2)
CHLORIDE SERPL-SCNC: 94 MMOL/L (ref 96–108)
CO2 SERPL-SCNC: 39 MMOL/L (ref 21–32)
CREAT SERPL-MCNC: 0.85 MG/DL (ref 0.6–1.3)
EOSINOPHIL # BLD AUTO: 0.07 THOUSAND/ΜL (ref 0–0.61)
EOSINOPHIL NFR BLD AUTO: 1 % (ref 0–6)
ERYTHROCYTE [DISTWIDTH] IN BLOOD BY AUTOMATED COUNT: 14 % (ref 11.6–15.1)
GFR SERPL CREATININE-BSD FRML MDRD: 100 ML/MIN/1.73SQ M
GLUCOSE SERPL-MCNC: 129 MG/DL (ref 65–140)
HCT VFR BLD AUTO: 39.2 % (ref 36.5–49.3)
HGB BLD-MCNC: 12.5 G/DL (ref 12–17)
IMM GRANULOCYTES # BLD AUTO: 0.02 THOUSAND/UL (ref 0–0.2)
IMM GRANULOCYTES NFR BLD AUTO: 0 % (ref 0–2)
LIPASE SERPL-CCNC: 177 U/L (ref 11–82)
LYMPHOCYTES # BLD AUTO: 0.97 THOUSANDS/ΜL (ref 0.6–4.47)
LYMPHOCYTES NFR BLD AUTO: 14 % (ref 14–44)
MAGNESIUM SERPL-MCNC: 2.2 MG/DL (ref 1.9–2.7)
MCH RBC QN AUTO: 30.2 PG (ref 26.8–34.3)
MCHC RBC AUTO-ENTMCNC: 31.9 G/DL (ref 31.4–37.4)
MCV RBC AUTO: 95 FL (ref 82–98)
MONOCYTES # BLD AUTO: 0.57 THOUSAND/ΜL (ref 0.17–1.22)
MONOCYTES NFR BLD AUTO: 8 % (ref 4–12)
NEUTROPHILS # BLD AUTO: 5.23 THOUSANDS/ΜL (ref 1.85–7.62)
NEUTS SEG NFR BLD AUTO: 76 % (ref 43–75)
NRBC BLD AUTO-RTO: 0 /100 WBCS
PHOSPHATE SERPL-MCNC: 4 MG/DL (ref 2.7–4.5)
PLATELET # BLD AUTO: 228 THOUSANDS/UL (ref 149–390)
PMV BLD AUTO: 10.5 FL (ref 8.9–12.7)
POTASSIUM SERPL-SCNC: 4 MMOL/L (ref 3.5–5.3)
PROT SERPL-MCNC: 7.4 G/DL (ref 6.4–8.4)
RBC # BLD AUTO: 4.14 MILLION/UL (ref 3.88–5.62)
SODIUM SERPL-SCNC: 140 MMOL/L (ref 135–147)
WBC # BLD AUTO: 6.9 THOUSAND/UL (ref 4.31–10.16)

## 2022-06-05 PROCEDURE — 85730 THROMBOPLASTIN TIME PARTIAL: CPT | Performed by: INTERNAL MEDICINE

## 2022-06-05 PROCEDURE — 85025 COMPLETE CBC W/AUTO DIFF WBC: CPT | Performed by: INTERNAL MEDICINE

## 2022-06-05 PROCEDURE — 83690 ASSAY OF LIPASE: CPT | Performed by: PHYSICIAN ASSISTANT

## 2022-06-05 PROCEDURE — 74022 RADEX COMPL AQT ABD SERIES: CPT

## 2022-06-05 PROCEDURE — 80053 COMPREHEN METABOLIC PANEL: CPT | Performed by: PHYSICIAN ASSISTANT

## 2022-06-05 PROCEDURE — 94640 AIRWAY INHALATION TREATMENT: CPT

## 2022-06-05 PROCEDURE — 99233 SBSQ HOSP IP/OBS HIGH 50: CPT | Performed by: INTERNAL MEDICINE

## 2022-06-05 PROCEDURE — 99232 SBSQ HOSP IP/OBS MODERATE 35: CPT | Performed by: SURGERY

## 2022-06-05 PROCEDURE — 83735 ASSAY OF MAGNESIUM: CPT | Performed by: PHYSICIAN ASSISTANT

## 2022-06-05 PROCEDURE — 94760 N-INVAS EAR/PLS OXIMETRY 1: CPT

## 2022-06-05 PROCEDURE — 84100 ASSAY OF PHOSPHORUS: CPT | Performed by: PHYSICIAN ASSISTANT

## 2022-06-05 RX ORDER — SODIUM CHLORIDE, SODIUM GLUCONATE, SODIUM ACETATE, POTASSIUM CHLORIDE, MAGNESIUM CHLORIDE, SODIUM PHOSPHATE, DIBASIC, AND POTASSIUM PHOSPHATE .53; .5; .37; .037; .03; .012; .00082 G/100ML; G/100ML; G/100ML; G/100ML; G/100ML; G/100ML; G/100ML
75 INJECTION, SOLUTION INTRAVENOUS CONTINUOUS
Status: DISCONTINUED | OUTPATIENT
Start: 2022-06-05 | End: 2022-06-05

## 2022-06-05 RX ORDER — FUROSEMIDE 10 MG/ML
20 INJECTION INTRAMUSCULAR; INTRAVENOUS ONCE
Status: COMPLETED | OUTPATIENT
Start: 2022-06-05 | End: 2022-06-05

## 2022-06-05 RX ADMIN — SODIUM CHLORIDE, SODIUM GLUCONATE, SODIUM ACETATE, POTASSIUM CHLORIDE, MAGNESIUM CHLORIDE, SODIUM PHOSPHATE, DIBASIC, AND POTASSIUM PHOSPHATE 75 ML/HR: .53; .5; .37; .037; .03; .012; .00082 INJECTION, SOLUTION INTRAVENOUS at 12:13

## 2022-06-05 RX ADMIN — BUDESONIDE AND FORMOTEROL FUMARATE DIHYDRATE 2 PUFF: 160; 4.5 AEROSOL RESPIRATORY (INHALATION) at 20:28

## 2022-06-05 RX ADMIN — HEPARIN SODIUM 2000 UNITS: 1000 INJECTION INTRAVENOUS; SUBCUTANEOUS at 18:26

## 2022-06-05 RX ADMIN — MORPHINE SULFATE 4 MG: 4 INJECTION INTRAVENOUS at 21:22

## 2022-06-05 RX ADMIN — HEPARIN SODIUM AND DEXTROSE 21.1 UNITS/KG/HR: 10000; 5 INJECTION INTRAVENOUS at 16:42

## 2022-06-05 RX ADMIN — MORPHINE SULFATE 4 MG: 4 INJECTION INTRAVENOUS at 06:12

## 2022-06-05 RX ADMIN — MORPHINE SULFATE 4 MG: 4 INJECTION INTRAVENOUS at 10:53

## 2022-06-05 RX ADMIN — HEPARIN SODIUM AND DEXTROSE 19.1 UNITS/KG/HR: 10000; 5 INJECTION INTRAVENOUS at 02:59

## 2022-06-05 RX ADMIN — NYSTATIN: 100000 CREAM TOPICAL at 09:07

## 2022-06-05 RX ADMIN — Medication: at 17:09

## 2022-06-05 RX ADMIN — NYSTATIN: 100000 CREAM TOPICAL at 17:09

## 2022-06-05 RX ADMIN — ONDANSETRON 4 MG: 2 INJECTION INTRAMUSCULAR; INTRAVENOUS at 21:21

## 2022-06-05 RX ADMIN — MORPHINE SULFATE 4 MG: 4 INJECTION INTRAVENOUS at 00:37

## 2022-06-05 RX ADMIN — BUDESONIDE AND FORMOTEROL FUMARATE DIHYDRATE 2 PUFF: 160; 4.5 AEROSOL RESPIRATORY (INHALATION) at 08:05

## 2022-06-05 RX ADMIN — FAMOTIDINE 20 MG: 10 INJECTION, SOLUTION INTRAVENOUS at 09:07

## 2022-06-05 RX ADMIN — MORPHINE SULFATE 4 MG: 4 INJECTION INTRAVENOUS at 16:39

## 2022-06-05 RX ADMIN — FUROSEMIDE 20 MG: 10 INJECTION, SOLUTION INTRAMUSCULAR; INTRAVENOUS at 12:12

## 2022-06-05 RX ADMIN — HEPARIN SODIUM 2000 UNITS: 1000 INJECTION INTRAVENOUS; SUBCUTANEOUS at 10:49

## 2022-06-05 RX ADMIN — Medication: at 09:07

## 2022-06-05 RX ADMIN — FLUTICASONE PROPIONATE 1 SPRAY: 50 SPRAY, METERED NASAL at 09:07

## 2022-06-05 NOTE — PROGRESS NOTES
Progress Note - General Surgery   Enoc Chacon 47 y o  male MRN: [id number]  Unit/Bed#: -01 Encounter: [id number]    Assessment:  Small bowel obstruction  I saw the x-ray obstruction series today  It shows the stomach to be still distended  There is air and stool in the colon  Continue with NG tube  Adjust the NG tube  Repeat labs tomorrow  Repeat x-ray abdomen obstruction series tomorrow  Plan:  As above  I had a long discussion with the patient regarding surgery and what to expect if surgery is needed  I told him that he would need to be shifted to another campus if we need to do surgery because of his multiple comorbidities and possible requirement for ICU care  He verbalized understanding  Subjective/Objective   Chief Complaint:  Dry heaves  Subjective:  Abdominal pain, dry heaves    Objective:  Patient looks comfortable  Abdomen is soft  Blood pressure 108/62, pulse 75, temperature (!) 97 3 °F (36 3 °C), temperature source Tympanic, resp  rate 20, height 5' 11" (1 803 m), weight (!) 227 kg (500 lb 10 6 oz), SpO2 94 %  ,Body mass index is 69 83 kg/m²  Intake/Output Summary (Last 24 hours) at 6/5/2022 1010  Last data filed at 6/4/2022 2000  Gross per 24 hour   Intake 900 ml   Output 900 ml   Net 0 ml       Invasive Devices  Report    Peripheral Intravenous Line  Duration           Peripheral IV 06/04/22 Distal;Right;Upper;Ventral (anterior) Arm 1 day    Peripheral IV 06/04/22 Left Antecubital 1 day          Drain  Duration           NG/OG/Enteral Tube Nasogastric 16 Fr Right nare 1 day                Physical Exam:  Patient has got extreme obesity  Abdomen is soft, nontender  He has large ventral hernia with loss of domain  It is nontender  NG tube is present  There is gastric contents in the NG tube with bile staining however there is no record of the amount  Lab, Imaging and other studies:I have personally reviewed pertinent lab results      VTE Pharmacologic Prophylaxis: Heparin  VTE Mechanical Prophylaxis: sequential compression device

## 2022-06-05 NOTE — ASSESSMENT & PLAN NOTE
· Noted, AC with Pradaxa in op setting, IV heparin while IP in case of need for OR in SBO and to avoid PO route in acute SBO  · Once he is taking po, can resume home [facility], [place]

## 2022-06-05 NOTE — ASSESSMENT & PLAN NOTE
· Rate controlled on admission  Will use tele to monitor rate while he is NPO  · While NPO use metoprolol 5 mg IV q6h prn HTN & tachycardia  · AC with Pradaxa, will use IV heparin while IP in case of need for OR as well as to avoid PO route in present SBO  · IV heparin dosage has to be increased secondary to subtherapeutic PTT levels and morbid obesity

## 2022-06-05 NOTE — NURSING NOTE
1004 pt states, " I usually get lasix 40mg twice per day and I did not receive any since I came here "

## 2022-06-05 NOTE — PROGRESS NOTES
[facility]  Progress Note - Brian Meneses 1970, 47 y o  male MRN: [id number]  Unit/Bed#: -01 Encounter: [id number]  Primary Care Provider: Gaviota Gonsalves MD   Date and time admitted to hospital: 6/4/2022 12:08 AM    History of DVT (deep vein thrombosis)  Assessment & Plan  · Noted, AC with Pradaxa in op setting, IV heparin while IP in case of need for OR in SBO and to avoid PO route in acute SBO  · Once he is taking po, can resume home AC    Chronic diastolic heart failure [facility])  Assessment & Plan  Wt Readings from Last 3 Encounters:   06/04/22 (!) 227 kg (500 lb 10 6 oz)   06/02/22 (!) 232 kg (512 lb)   05/25/22 (!) 233 kg (512 lb 12 6 oz)     · Dry weight around 510-520# although difficult to assess fluid status because of morbid obesity  Daily weights  · 4/4/22 echo reviewed  · Maintenance IVF very gentle for this patient while he is NPO status with SBO  Give 1 dose of IV Lasix 20 mg as patient feels his hands are swelling up  Obesity hypoventilation syndrome/FILIPPO  Assessment & Plan  · 4L O2 NC at baseline  · Continue daily inhalers  · CPAP QHS    Morbid obesity (HCC)  Assessment & Plan  · Noted  · Prior h/o gastric bypass  · Lives at SNF for assistance with ADLs    A-fib [facility])  Assessment & Plan  · Rate controlled on admission  Will use tele to monitor rate while he is NPO  · While NPO use metoprolol 5 mg IV q6h prn HTN & tachycardia  · AC with Pradaxa, will use IV heparin while IP in case of need for OR as well as to avoid PO route in present SBO  · IV heparin dosage has to be increased secondary to subtherapeutic PTT levels and morbid obesity  * SBO  Assessment & Plan  · CT imaging reviewed  · NGT placed in the ED, suction-continue  · NPO  · Pain management, symptom management  · Maintenance IVF  · Gen surg consult-discussed with General surgery, recommended to continue conservative management at this time  · Dulcolax rectal suppository per surgery    · AM abdominal series XR        VTE Pharmacologic Prophylaxis:   Pharmacologic: Heparin Drip  Mechanical VTE Prophylaxis in Place: Yes    Patient Centered Rounds: I have performed bedside rounds with nursing staff today  Discussions with Specialists or Other Care Team Provider:  General surgery    Education and Discussions with Family / Patient:  Patient    Time Spent for Care: 30 minutes  More than 50% of total time spent on counseling and coordination of care as described above  Current Length of Stay: 1 day(s)    Current Patient Status: Inpatient   Certification Statement: The patient will continue to require additional inpatient hospital stay due to Small-bowel obstruction    Discharge Plan:  72 hours    Code Status: Level 1 - Full Code      Subjective:   Patient complains of abdominal pain, intermittent, associated with vomiting  Currently has NG tube, gastric contents are being suctioned out  Patient states he is passing intermittent flatness    Objective:     Vitals:   Temp (24hrs), Av 1 °F (36 2 °C), Min:96 3 °F (35 7 °C), Max:97 5 °F (36 4 °C)    Temp:  [96 3 °F (35 7 °C)-97 5 °F (36 4 °C)] 97 3 °F (36 3 °C)  HR:  [] 75  Resp:  [20] 20  BP: (103-142)/(60-88) 108/62  SpO2:  [91 %-95 %] 94 %  Body mass index is 69 83 kg/m²  Input and Output Summary (last 24 hours):        Intake/Output Summary (Last 24 hours) at 2022 1115  Last data filed at 2022 2000  Gross per 24 hour   Intake 900 ml   Output 900 ml   Net 0 ml       Physical Exam:     Physical Exam  General appearance:  Patient not in acute distress  Eyes:  Pupils equal reacting to light  ENT:  Moist oral mucous membranes, NG tube in place  CVS:  S1-S2 heard, regular rate and rhythm, no pedal edema  Chest:  Bilateral air entry present, clear to auscultation  Abdomen:  Soft, tender mildly, no guarding, rigidity bowel sounds present  CNS:  No focal neurological deficits  Genitourinary: deferred  Skin:  No acute rash   psychiatric:  No psychosis  Musculoskeletal:  No joint deformities      Additional Data:     Labs:    Results from last 7 days   Lab Units 06/05/22  0558   WBC Thousand/uL 6 90   HEMOGLOBIN g/dL 12 5   HEMATOCRIT % 39 2   PLATELETS Thousands/uL 228   NEUTROS PCT % 76*   LYMPHS PCT % 14   MONOS PCT % 8   EOS PCT % 1     Results from last 7 days   Lab Units 06/05/22  0558   SODIUM mmol/L 140   POTASSIUM mmol/L 4 0   CHLORIDE mmol/L 94*   CO2 mmol/L 39*   BUN mg/dL 15   CREATININE mg/dL 0 85   ANION GAP mmol/L 7   CALCIUM mg/dL 9 8   ALBUMIN g/dL 3 7   TOTAL BILIRUBIN mg/dL 0 54   ALK PHOS U/L 74   ALT U/L 18   AST U/L 17   GLUCOSE RANDOM mg/dL 129     Results from last 7 days   Lab Units 06/04/22  0654   INR  1 13             Results from last 7 days   Lab Units 06/04/22  0056   LACTIC ACID mmol/L 1 3           * I Have Reviewed All Lab Data Listed Above  * Additional Pertinent Lab Tests Reviewed:  All [facility] Admission Reviewed        Recent Cultures (last 7 days):           Last 24 Hours Medication List:   Current Facility-Administered Medications   Medication Dose Route Frequency Provider Last Rate    acetaminophen  325 mg Rectal Q4H PRN Antonia Demo, PA-C      albuterol  2 5 mg Nebulization Q6H PRN Antoniayvon Cuadrao, PA-C      ammonium lactate   Topical BID Antoniayvon Cuadrao, PA-C      budesonide-formoterol  2 puff Inhalation BID Bozena Brady MD      famotidine  20 mg Intravenous Q24H [facility] Antonia Coker, PA-C      fluticasone  1 spray Each Nare Daily Antonia Coker PA-C      heparin (porcine)  3-25 Units/kg/hr (Order-Specific) Intravenous Titrated Mark Booker MD 21 1 Units/kg/hr (06/05/22 1043)    heparin (porcine)  2,000 Units Intravenous Q1H PRN Antonia Veneciao, PA-C      heparin (porcine)  4,000 Units Intravenous Q1H PRN Antonia Demo, PA-C      metoprolol  5 mg Intravenous Q6H PRN Antonia Veneciao, PA-C      morphine injection  4 mg Intravenous Q4H PRN Antonia Veneciao, PA-C      morphine injection  2 mg Intravenous Q4H PRN [facility] JIMENA Coker      nystatin   Topical BID Antonia Coker PA-C      ondansetron  4 mg Intravenous Q4H PRN Antonia Coker PA-C      promethazine  25 mg Intravenous Q6H PRN Jayshree Smith PA-C          Today, Patient Was Seen By: Darien Pablo MD    ** Please Note: Dictation voice to text software may have been used in the creation of this document   **

## 2022-06-05 NOTE — PLAN OF CARE
Problem: MOBILITY - ADULT  Goal: Maintain or return to baseline ADL function  Description: INTERVENTIONS:  -  Assess patient's ability to carry out ADLs; assess patient's baseline for ADL function and identify physical deficits which impact ability to perform ADLs (bathing, care of mouth/teeth, toileting, grooming, dressing, etc )  - Assess/evaluate cause of self-care deficits   - Assess range of motion  - Assess patient's mobility; develop plan if impaired  - Assess patient's need for assistive devices and provide as appropriate  - Encourage maximum independence but intervene and supervise when necessary  - Involve family in performance of ADLs  - Assess for home care needs following discharge   - Consider OT consult to assist with ADL evaluation and planning for discharge  - Provide patient education as appropriate  Outcome: Progressing  Goal: Maintains/Returns to pre admission functional level  Description: INTERVENTIONS:  - Perform BMAT or MOVE assessment daily    - Set and communicate daily mobility goal to care team and patient/family/caregiver  - Collaborate with rehabilitation services on mobility goals if consulted  - Perform Range of Motion 2 times a day  - Reposition patient every 2 hours    - Dangle patient 2 times a day  - Stand patient 2 times a day  - Ambulate patient 2 times a day  - Out of bed to chair 2 times a day   - Out of bed for meals 2 times a day  - Out of bed for toileting  - Record patient progress and toleration of activity level   Outcome: Progressing     Problem: GASTROINTESTINAL - ADULT  Goal: Minimal or absence of nausea and/or vomiting  Description: INTERVENTIONS:  - Administer IV fluids if ordered to ensure adequate hydration  - Maintain NPO status until nausea and vomiting are resolved  - Nasogastric tube if ordered  - Administer ordered antiemetic medications as needed  - Provide nonpharmacologic comfort measures as appropriate  - Advance diet as tolerated, if ordered  - Consider nutrition services referral to assist patient with adequate nutrition and appropriate food choices  Outcome: Progressing  Goal: Maintains or returns to baseline bowel function  Description: INTERVENTIONS:  - Assess bowel function  - Encourage oral fluids to ensure adequate hydration  - Administer IV fluids if ordered to ensure adequate hydration  - Administer ordered medications as needed  - Encourage mobilization and activity  - Consider nutritional services referral to assist patient with adequate nutrition and appropriate food choices  Outcome: Progressing  Goal: Maintains adequate nutritional intake  Description: INTERVENTIONS:  - Monitor percentage of each meal consumed  - Identify factors contributing to decreased intake, treat as appropriate  - Assist with meals as needed  - Monitor I&O, weight, and lab values if indicated  - Obtain nutrition services referral as needed  Outcome: Progressing  Goal: Establish and maintain optimal ostomy function  Description: INTERVENTIONS:  - Assess bowel function  - Encourage oral fluids to ensure adequate hydration  - Administer IV fluids if ordered to ensure adequate hydration   - Administer ordered medications as needed  - Encourage mobilization and activity  - Nutrition services referral to assist patient with appropriate food choices  - Assess stoma site  - Consider wound care consult   Outcome: Progressing  Goal: Oral mucous membranes remain intact  Description: INTERVENTIONS  - Assess oral mucosa and hygiene practices  - Implement preventative oral hygiene regimen  - Implement oral medicated treatments as ordered  - Initiate Nutrition services referral as needed  Outcome: Progressing     Problem: Prexisting or High Potential for Compromised Skin Integrity  Goal: Skin integrity is maintained or improved  Description: INTERVENTIONS:  - Identify patients at risk for skin breakdown  - Assess and monitor skin integrity  - Assess and monitor nutrition and hydration status  - Monitor labs   - Assess for incontinence   - Turn and reposition patient  - Assist with mobility/ambulation  - Relieve pressure over bony prominences  - Avoid friction and shearing  - Provide appropriate hygiene as needed including keeping skin clean and dry  - Evaluate need for skin moisturizer/barrier cream  - Collaborate with interdisciplinary team   - Patient/family teaching  - Consider wound care consult   Outcome: Progressing     Problem: Potential for Falls  Goal: Patient will remain free of falls  Description: INTERVENTIONS:  - Educate patient/family on patient safety including physical limitations  - Instruct patient to call for assistance with activity   - Consult OT/PT to assist with strengthening/mobility   - Keep Call bell within reach  - Keep bed low and locked with side rails adjusted as appropriate  - Keep care items and personal belongings within reach  - Initiate and maintain comfort rounds  - Make Fall Risk Sign visible to staff  - Offer Toileting every 2 Hours, in advance of need  - Initiate/Maintain bed alarm  - Obtain necessary fall risk management equipment:   - Apply yellow socks and bracelet for high fall risk patients  - Consider moving patient to room near nurses station  Outcome: Progressing

## 2022-06-05 NOTE — ASSESSMENT & PLAN NOTE
· CT imaging reviewed  · NGT placed in the ED, suction-continue  · NPO  · Pain management, symptom management  · Maintenance IVF  · Gen surg consult-discussed with General surgery, recommended to continue conservative management at this time  · Dulcolax rectal suppository per surgery    · AM abdominal series XR

## 2022-06-05 NOTE — UTILIZATION REVIEW
Initial Clinical Review    Admission: Date/Time/Statement:   Admission Orders (From admission, onward)     Ordered        06/04/22 0434  INPATIENT ADMISSION  Once                      Orders Placed This Encounter   Procedures    INPATIENT ADMISSION     Standing Status:   Standing     Number of Occurrences:   1     Order Specific Question:   Level of Care     Answer:   Med Surg [16]     Order Specific Question:   Bed Type     Answer:   Nabila [4]     Order Specific Question:   Estimated length of stay     Answer:   More than 2 Midnights     Order Specific Question:   Certification     Answer:   I certify that inpatient services are medically necessary for this patient for a duration of greater than two midnights  See H&P and MD Progress Notes for additional information about the patient's course of treatment  ED Arrival Information     Expected   -    Arrival   6/4/2022 00:05    Acuity   Urgent            Means of arrival   Ambulance    Escorted by   [facility]    Service   Hospitalist    Admission type   Urgent            Arrival complaint   Abdominal pain           Chief Complaint   Patient presents with    Abdominal Pain     Arrives via EMS from [facility] for abdominal pain and vomiting that has been going on all day  Has had tylenol and zofran without relief  Initial Presentation:   47 yom to ER from [facility] via EMS c/o progressive diffuse abd pain with N&V, diarrhea x 1 this morning  Hx COPD with chronic O2 needs 4L NC, OHS, obesity, H/o DVT, Afib AC with pradaxa, H/o CHF, venous insufficiency, h/o ventral hernia with abd surgical intervention in the distant past, HTN, SBO, jayme  Presents as above with generalized abd tenderness  Admission work-up showing SBO on imaging  Admitted to inpatient status for SBO  NGT placed to suction, surgery consulted  Heparin gtt while NPO for hx DVT on Pradaxa at home  Per surg: SBO  Continue NG tube suction  Continue IV fluids    Continue IV heparin  Management of fluids as per cardiac status  X-ray abdomen obstruction series tomorrow morning  Date: 6/5/22   Day 2:   SBO, NGT remains to suction for bile colored drainage  Abd soft, tender, +BS, passing intermittent flatus  Obstruction series today showing stomach still distended per MD note  Continue conservative tx with NGT  Repeat xray tomorrow  ED Triage Vitals [06/04/22 0010]   Temperature Pulse Respirations Blood Pressure SpO2   (!) 97 3 °F (36 3 °C) 60 18 138/59 100 %      Temp Source Heart Rate Source Patient Position - Orthostatic VS BP Location FiO2 (%)   Oral Monitor Lying Left arm --      Pain Score       8          Wt Readings from Last 1 Encounters:   06/04/22 (!) 227 kg (500 lb 10 6 oz)     Additional Vital Signs:   Date/Time Temp Pulse Resp BP MAP (mmHg) SpO2 Calculated FIO2 (%) - Nasal Cannula Nasal Cannula O2 Flow Rate (L/min) O2 Device Patient Position - Orthostatic VS   06/05/22 0743 97 3 °F (36 3 °C) Abnormal  75 20 108/62 -- 91 % -- -- -- Lying   06/04/22 2325 97 2 °F (36 2 °C) Abnormal  115 Abnormal  20 106/88 97 94 % 30 2 5 L/min Nasal cannula Lying   06/04/22 2027 97 5 °F (36 4 °C) 60 20 103/60 75 92 % 30 2 5 L/min Nasal cannula Lying   06/04/22 1925 -- -- -- -- -- 95 % 30 2 5 L/min Nasal cannula --   06/04/22 1432 96 3 °F (35 7 °C) Abnormal  55 20 142/81 -- 94 % -- -- -- Lying   06/04/22 0853 -- -- -- -- -- 100 % -- -- Nasal cannula --   06/04/22 0738 96 4 °F (35 8 °C) Abnormal  86 16 124/74 -- 96 % -- -- -- Lying   06/04/22 0620 96 4 °F (35 8 °C) Abnormal  112 Abnormal  20 144/77 98 94 % 30 2 5 L/min Nasal cannula Lying   06/04/22 0238 -- 87 18 112/76 -- 96 % 36 4 L/min Nasal cannula Lying     Pertinent Labs/Diagnostic Test Results:   CT abdomen pelvis wo contrast   Final Result by George Oconnell MD (06/04 4352)      Findings consistent with small bowel obstruction at the proximal jejunum with transition point at the left upper abdomen       Results from last 7 days Lab Units 06/05/22  0558 06/04/22  0056   WBC Thousand/uL 6 90 6 94   HEMOGLOBIN g/dL 12 5 13 5   HEMATOCRIT % 39 2 41 3   PLATELETS Thousands/uL 228 223   NEUTROS ABS Thousands/µL 5 23 5 13     Results from last 7 days   Lab Units 06/05/22  0558 06/04/22  0056   SODIUM mmol/L 140 136   POTASSIUM mmol/L 4 0 4 0   CHLORIDE mmol/L 94* 90*   CO2 mmol/L 39* 40*   ANION GAP mmol/L 7 6   BUN mg/dL 15 14   CREATININE mg/dL 0 85 0 96   EGFR ml/min/1 73sq m 100 91   CALCIUM mg/dL 9 8 10 0   MAGNESIUM mg/dL 2 2  --    PHOSPHORUS mg/dL 4 0  --      Results from last 7 days   Lab Units 06/05/22  0558 06/04/22  0056   AST U/L 17 29   ALT U/L 18 20   ALK PHOS U/L 74 73   TOTAL PROTEIN g/dL 7 4 8 0   ALBUMIN g/dL 3 7 3 8   TOTAL BILIRUBIN mg/dL 0 54 0 60     Results from last 7 days   Lab Units 06/05/22  0558 06/04/22  0056   GLUCOSE RANDOM mg/dL 129 142*     Results from last 7 days   Lab Units 06/05/22  0558 06/04/22  2132 06/04/22  1329 06/04/22  0654   PROTIME seconds  --   --   --  14 4   INR   --   --   --  1 13   PTT seconds 49* 21* 33 33     Results from last 7 days   Lab Units 06/04/22  0056   LACTIC ACID mmol/L 1 3     Results from last 7 days   Lab Units 06/05/22  0558 06/04/22  0056   LIPASE u/L 177* 210*     ED Treatment:   Medication Administration from 06/04/2022 0006 to 06/04/2022 0546       Date/Time Order Dose Route Action     06/04/2022 0105 ondansetron (ZOFRAN) injection 4 mg 4 mg Intravenous Given     06/04/2022 0106 morphine (PF) 4 mg/mL injection 4 mg 4 mg Intravenous Given     06/04/2022 0203 ondansetron (ZOFRAN) injection 4 mg 4 mg Intravenous Given     06/04/2022 0416 morphine injection 2 mg 2 mg Intravenous Given     06/04/2022 0409 barium (READI-CAT 2) suspension 900 mL 900 mL Oral Given        Past Medical History:   Diagnosis Date    [facility]     Anemia     Anxiety     Cancer ([email or web address] )     Cardiac disease     Cellulitis     COPD (chronic obstructive pulmonary disease) ([email or web address] )     Depression     Diabetes mellitus ([facility] )     [facility] venous thrombosis) ([facility]     GERD (gastroesophageal reflux disease)     HBP (high blood pressure)     Heart failure ([facility])     Hx of blood clots     Hypertension     Hypokalemia     Hypothyroid     Obesity     Psychiatric disorder      Present on Admission:   Morbid obesity ([facility] )   Obesity hypoventilation syndrome/FILIPPO   Chronic diastolic heart failure ([facility])   A-fib ([facility])   SBO    Admitting Diagnosis: Morbid obesity ([facility]) [E66 01]  SBO (small bowel obstruction) ([facility]) [K56 609]  Abdominal pain [R10 9]  Age/Sex: 47 y o  male  Admission Orders:  Telemetry  Scd/foot pumps  Consult surgery  NGT>suction    Scheduled Medications:  ammonium lactate, , Topical, BID  budesonide-formoterol, 2 puff, Inhalation, BID  famotidine, 20 mg, Intravenous, Q24H MARY  fluticasone, 1 spray, Each Nare, Daily  furosemide (LASIX) injection 20 mg, Once 6/5  nystatin, , Topical, BID    Continuous IV Infusions:  heparin (porcine), 3-20 Units/kg/hr (Order-Specific), Intravenous, Titrated  multi-electrolyte (PLASMALYTE-A/ISOLYTE-S PH 7 4) IV solution  Rate: 75 mL/hr Dose: 75 mL/hr  Freq: Continuous Route: IV  Indications of Use: IV Hydration  Last Dose: 75 mL/hr (06/05/22 1213)  Start: 06/05/22 1130 End: 06/05/22 1812    PRN Meds:  acetaminophen, 325 mg, Rectal, Q4H PRN  albuterol, 2 5 mg, Nebulization, Q6H PRN  heparin (porcine), 2,000 Units, Intravenous, Q1H PRN  heparin (porcine), 4,000 Units, Intravenous, Q1H PRN  metoprolol, 5 mg, Intravenous, Q6H PRN  morphine injection, 4 mg, Intravenous, Q4H PRN  morphine injection, 2 mg, Intravenous, Q4H PRN  ondansetron, 4 mg, Intravenous, Q4H PRN  promethazine, 25 mg, Intravenous, Q6H PRN    Network Utilization Review Department  ATTENTION: Please call with any questions or concerns to [phone] and carefully listen to the prompts so that you are directed to the right person   All voicemails are confidential   Becca Vargas all requests for admission clinical reviews, approved or denied determinations and any other requests to dedicated fax number below belonging to the campus where the patient is receiving treatment   List of dedicated fax numbers for the Facilities:  [address] [place] [address] DENIALS (Administrative/Medical Necessity) [phone]   [address] (Maternity/NICU/Pediatrics) [phone] [address] [place] [address] [facility] [address] [facility] [id number] [address] Waldemar Montenegro 1481 [address] [facility] [place] [address] [phone]

## 2022-06-05 NOTE — ASSESSMENT & PLAN NOTE
Wt Readings from Last 3 Encounters:   06/04/22 (!) 227 kg (500 lb 10 6 oz)   06/02/22 (!) 232 kg (512 lb)   05/25/22 (!) 233 kg (512 lb 12 6 oz)     · Dry weight around 510-520# although difficult to assess fluid status because of morbid obesity  Daily weights  · 4/4/22 echo reviewed  · Maintenance IVF very gentle for this patient while he is NPO status with SBO  Give 1 dose of IV Lasix 20 mg as patient feels his hands are swelling up

## 2022-06-06 ENCOUNTER — HOSPITAL ENCOUNTER (INPATIENT)
Facility: HOSPITAL | Age: 52
LOS: 8 days | DRG: 224 | End: 2022-06-14
Attending: FAMILY MEDICINE | Admitting: INTERNAL MEDICINE
Payer: COMMERCIAL

## 2022-06-06 ENCOUNTER — APPOINTMENT (INPATIENT)
Dept: RADIOLOGY | Facility: HOSPITAL | Age: 52
DRG: 247 | End: 2022-06-06
Payer: COMMERCIAL

## 2022-06-06 VITALS
HEIGHT: 71 IN | DIASTOLIC BLOOD PRESSURE: 58 MMHG | SYSTOLIC BLOOD PRESSURE: 99 MMHG | HEART RATE: 78 BPM | OXYGEN SATURATION: 97 % | BODY MASS INDEX: 44.1 KG/M2 | RESPIRATION RATE: 16 BRPM | WEIGHT: 315 LBS | TEMPERATURE: 98.8 F

## 2022-06-06 DIAGNOSIS — K56.609 SMALL BOWEL OBSTRUCTION (HCC): Primary | ICD-10-CM

## 2022-06-06 DIAGNOSIS — I48.91 ATRIAL FIBRILLATION WITH RAPID VENTRICULAR RESPONSE (HCC): ICD-10-CM

## 2022-06-06 DIAGNOSIS — I10 HYPERTENSION, UNSPECIFIED TYPE: ICD-10-CM

## 2022-06-06 DIAGNOSIS — E66.01 MORBID OBESITY (HCC): ICD-10-CM

## 2022-06-06 PROBLEM — F32.A DEPRESSION: Status: ACTIVE | Noted: 2022-06-06

## 2022-06-06 PROBLEM — E11.9 DIABETES MELLITUS (HCC): Status: ACTIVE | Noted: 2022-01-01

## 2022-06-06 LAB
ANION GAP SERPL CALCULATED.3IONS-SCNC: 11 MMOL/L (ref 4–13)
APTT PPP: 50 SECONDS (ref 23–37)
APTT PPP: 58 SECONDS (ref 23–37)
APTT PPP: 75 SECONDS (ref 23–37)
ATRIAL RATE: 108 BPM
BASOPHILS # BLD AUTO: 0.03 THOUSANDS/ΜL (ref 0–0.1)
BASOPHILS NFR BLD AUTO: 1 % (ref 0–1)
BUN SERPL-MCNC: 15 MG/DL (ref 5–25)
CALCIUM SERPL-MCNC: 9.9 MG/DL (ref 8.4–10.2)
CHLORIDE SERPL-SCNC: 93 MMOL/L (ref 96–108)
CO2 SERPL-SCNC: 38 MMOL/L (ref 21–32)
CREAT SERPL-MCNC: 0.83 MG/DL (ref 0.6–1.3)
EOSINOPHIL # BLD AUTO: 0.13 THOUSAND/ΜL (ref 0–0.61)
EOSINOPHIL NFR BLD AUTO: 2 % (ref 0–6)
ERYTHROCYTE [DISTWIDTH] IN BLOOD BY AUTOMATED COUNT: 14.6 % (ref 11.6–15.1)
GFR SERPL CREATININE-BSD FRML MDRD: 101 ML/MIN/1.73SQ M
GLUCOSE SERPL-MCNC: 78 MG/DL (ref 65–140)
HCT VFR BLD AUTO: 40.2 % (ref 36.5–49.3)
HGB BLD-MCNC: 12.6 G/DL (ref 12–17)
IMM GRANULOCYTES # BLD AUTO: 0.03 THOUSAND/UL (ref 0–0.2)
IMM GRANULOCYTES NFR BLD AUTO: 1 % (ref 0–2)
LYMPHOCYTES # BLD AUTO: 1.05 THOUSANDS/ΜL (ref 0.6–4.47)
LYMPHOCYTES NFR BLD AUTO: 17 % (ref 14–44)
MAGNESIUM SERPL-MCNC: 2.4 MG/DL (ref 1.6–2.6)
MCH RBC QN AUTO: 30.1 PG (ref 26.8–34.3)
MCHC RBC AUTO-ENTMCNC: 31.3 G/DL (ref 31.4–37.4)
MCV RBC AUTO: 96 FL (ref 82–98)
MONOCYTES # BLD AUTO: 0.54 THOUSAND/ΜL (ref 0.17–1.22)
MONOCYTES NFR BLD AUTO: 9 % (ref 4–12)
NEUTROPHILS # BLD AUTO: 4.43 THOUSANDS/ΜL (ref 1.85–7.62)
NEUTS SEG NFR BLD AUTO: 70 % (ref 43–75)
NRBC BLD AUTO-RTO: 0 /100 WBCS
PLATELET # BLD AUTO: 212 THOUSANDS/UL (ref 149–390)
PMV BLD AUTO: 11 FL (ref 8.9–12.7)
POTASSIUM SERPL-SCNC: 3.6 MMOL/L (ref 3.5–5.3)
POTASSIUM SERPL-SCNC: 3.9 MMOL/L (ref 3.5–5.3)
PR INTERVAL: 162 MS
QRS AXIS: 21 DEGREES
QRSD INTERVAL: 115 MS
QT INTERVAL: 396 MS
QTC INTERVAL: 529 MS
RBC # BLD AUTO: 4.19 MILLION/UL (ref 3.88–5.62)
SODIUM SERPL-SCNC: 142 MMOL/L (ref 135–147)
T WAVE AXIS: -5 DEGREES
VENTRICULAR RATE: 107 BPM
WBC # BLD AUTO: 6.21 THOUSAND/UL (ref 4.31–10.16)

## 2022-06-06 PROCEDURE — 94660 CPAP INITIATION&MGMT: CPT

## 2022-06-06 PROCEDURE — 74022 RADEX COMPL AQT ABD SERIES: CPT

## 2022-06-06 PROCEDURE — 85730 THROMBOPLASTIN TIME PARTIAL: CPT | Performed by: INTERNAL MEDICINE

## 2022-06-06 PROCEDURE — NC001 PR NO CHARGE: Performed by: SURGERY

## 2022-06-06 PROCEDURE — 84132 ASSAY OF SERUM POTASSIUM: CPT | Performed by: NURSE PRACTITIONER

## 2022-06-06 PROCEDURE — 94760 N-INVAS EAR/PLS OXIMETRY 1: CPT

## 2022-06-06 PROCEDURE — 93010 ELECTROCARDIOGRAM REPORT: CPT | Performed by: INTERNAL MEDICINE

## 2022-06-06 PROCEDURE — 99232 SBSQ HOSP IP/OBS MODERATE 35: CPT | Performed by: SURGERY

## 2022-06-06 PROCEDURE — 99239 HOSP IP/OBS DSCHRG MGMT >30: CPT | Performed by: INTERNAL MEDICINE

## 2022-06-06 PROCEDURE — 85025 COMPLETE CBC W/AUTO DIFF WBC: CPT | Performed by: INTERNAL MEDICINE

## 2022-06-06 PROCEDURE — 99222 1ST HOSP IP/OBS MODERATE 55: CPT | Performed by: NURSE PRACTITIONER

## 2022-06-06 PROCEDURE — 87081 CULTURE SCREEN ONLY: CPT | Performed by: FAMILY MEDICINE

## 2022-06-06 PROCEDURE — 94640 AIRWAY INHALATION TREATMENT: CPT

## 2022-06-06 PROCEDURE — 83735 ASSAY OF MAGNESIUM: CPT | Performed by: NURSE PRACTITIONER

## 2022-06-06 PROCEDURE — 85730 THROMBOPLASTIN TIME PARTIAL: CPT | Performed by: NURSE PRACTITIONER

## 2022-06-06 PROCEDURE — 80048 BASIC METABOLIC PNL TOTAL CA: CPT | Performed by: INTERNAL MEDICINE

## 2022-06-06 RX ORDER — METOPROLOL TARTRATE 5 MG/5ML
2.5 INJECTION INTRAVENOUS EVERY 6 HOURS
Status: DISCONTINUED | OUTPATIENT
Start: 2022-06-06 | End: 2022-06-07

## 2022-06-06 RX ORDER — SODIUM CHLORIDE 9 MG/ML
75 INJECTION, SOLUTION INTRAVENOUS CONTINUOUS
Status: DISCONTINUED | OUTPATIENT
Start: 2022-06-06 | End: 2022-06-06 | Stop reason: HOSPADM

## 2022-06-06 RX ORDER — HEPARIN SODIUM 10000 [USP'U]/100ML
3-25 INJECTION, SOLUTION INTRAVENOUS
Status: DISCONTINUED | OUTPATIENT
Start: 2022-06-06 | End: 2022-06-07

## 2022-06-06 RX ORDER — FAMOTIDINE 10 MG/ML
20 INJECTION, SOLUTION INTRAVENOUS EVERY 12 HOURS SCHEDULED
Status: DISCONTINUED | OUTPATIENT
Start: 2022-06-06 | End: 2022-06-14

## 2022-06-06 RX ORDER — HEPARIN SODIUM 1000 [USP'U]/ML
4000 INJECTION, SOLUTION INTRAVENOUS; SUBCUTANEOUS
Status: DISCONTINUED | OUTPATIENT
Start: 2022-06-06 | End: 2022-06-13

## 2022-06-06 RX ORDER — LEVALBUTEROL INHALATION SOLUTION 0.63 MG/3ML
0.63 SOLUTION RESPIRATORY (INHALATION) EVERY 4 HOURS PRN
Status: DISCONTINUED | OUTPATIENT
Start: 2022-06-06 | End: 2022-06-14

## 2022-06-06 RX ORDER — FUROSEMIDE 10 MG/ML
20 INJECTION INTRAMUSCULAR; INTRAVENOUS DAILY
Status: DISCONTINUED | OUTPATIENT
Start: 2022-06-07 | End: 2022-06-08

## 2022-06-06 RX ORDER — FAMOTIDINE 10 MG/ML
20 INJECTION, SOLUTION INTRAVENOUS
Status: DISCONTINUED | OUTPATIENT
Start: 2022-06-07 | End: 2022-06-06

## 2022-06-06 RX ORDER — SODIUM CHLORIDE 9 MG/ML
75 INJECTION, SOLUTION INTRAVENOUS CONTINUOUS
Status: DISCONTINUED | OUTPATIENT
Start: 2022-06-06 | End: 2022-06-06

## 2022-06-06 RX ORDER — FLUTICASONE PROPIONATE 50 MCG
1 SPRAY, SUSPENSION (ML) NASAL DAILY
Status: DISCONTINUED | OUTPATIENT
Start: 2022-06-07 | End: 2022-06-14

## 2022-06-06 RX ORDER — HEPARIN SODIUM 1000 [USP'U]/ML
2000 INJECTION, SOLUTION INTRAVENOUS; SUBCUTANEOUS
Status: DISCONTINUED | OUTPATIENT
Start: 2022-06-06 | End: 2022-06-13

## 2022-06-06 RX ORDER — ONDANSETRON 2 MG/ML
4 INJECTION INTRAMUSCULAR; INTRAVENOUS EVERY 4 HOURS PRN
Status: CANCELLED | OUTPATIENT
Start: 2022-06-06

## 2022-06-06 RX ORDER — ALBUTEROL SULFATE 2.5 MG/3ML
2.5 SOLUTION RESPIRATORY (INHALATION) EVERY 6 HOURS PRN
Status: CANCELLED | OUTPATIENT
Start: 2022-06-06

## 2022-06-06 RX ORDER — ONDANSETRON 2 MG/ML
4 INJECTION INTRAMUSCULAR; INTRAVENOUS EVERY 4 HOURS PRN
Status: DISCONTINUED | OUTPATIENT
Start: 2022-06-06 | End: 2022-06-14

## 2022-06-06 RX ORDER — PROMETHAZINE HYDROCHLORIDE 25 MG/ML
25 INJECTION, SOLUTION INTRAMUSCULAR; INTRAVENOUS EVERY 6 HOURS PRN
Status: CANCELLED | OUTPATIENT
Start: 2022-06-06

## 2022-06-06 RX ORDER — METOPROLOL TARTRATE 5 MG/5ML
5 INJECTION INTRAVENOUS EVERY 6 HOURS PRN
Status: CANCELLED | OUTPATIENT
Start: 2022-06-06

## 2022-06-06 RX ORDER — FLUTICASONE PROPIONATE 50 MCG
1 SPRAY, SUSPENSION (ML) NASAL DAILY
Status: CANCELLED | OUTPATIENT
Start: 2022-06-07

## 2022-06-06 RX ORDER — HEPARIN SODIUM 10000 [USP'U]/100ML
3-25 INJECTION, SOLUTION INTRAVENOUS
Status: CANCELLED | OUTPATIENT
Start: 2022-06-06

## 2022-06-06 RX ORDER — AMMONIUM LACTATE 12 G/100G
CREAM TOPICAL 2 TIMES DAILY
Status: CANCELLED | OUTPATIENT
Start: 2022-06-06

## 2022-06-06 RX ORDER — SODIUM CHLORIDE 9 MG/ML
75 INJECTION, SOLUTION INTRAVENOUS CONTINUOUS
Status: CANCELLED | OUTPATIENT
Start: 2022-06-06

## 2022-06-06 RX ORDER — DEXTROSE, SODIUM CHLORIDE, AND POTASSIUM CHLORIDE 5; .45; .15 G/100ML; G/100ML; G/100ML
100 INJECTION INTRAVENOUS CONTINUOUS
Status: DISCONTINUED | OUTPATIENT
Start: 2022-06-06 | End: 2022-06-12

## 2022-06-06 RX ORDER — LEVALBUTEROL 1.25 MG/.5ML
1.25 SOLUTION, CONCENTRATE RESPIRATORY (INHALATION)
Status: DISCONTINUED | OUTPATIENT
Start: 2022-06-07 | End: 2022-06-14

## 2022-06-06 RX ORDER — AMMONIUM LACTATE 12 G/100G
CREAM TOPICAL 2 TIMES DAILY
Status: DISCONTINUED | OUTPATIENT
Start: 2022-06-07 | End: 2022-06-14

## 2022-06-06 RX ORDER — BUDESONIDE AND FORMOTEROL FUMARATE DIHYDRATE 160; 4.5 UG/1; UG/1
2 AEROSOL RESPIRATORY (INHALATION)
Status: DISCONTINUED | OUTPATIENT
Start: 2022-06-06 | End: 2022-06-14

## 2022-06-06 RX ORDER — MORPHINE SULFATE 4 MG/ML
4 INJECTION, SOLUTION INTRAMUSCULAR; INTRAVENOUS EVERY 4 HOURS PRN
Status: CANCELLED | OUTPATIENT
Start: 2022-06-06

## 2022-06-06 RX ORDER — BUDESONIDE AND FORMOTEROL FUMARATE DIHYDRATE 160; 4.5 UG/1; UG/1
2 AEROSOL RESPIRATORY (INHALATION)
Status: CANCELLED | OUTPATIENT
Start: 2022-06-06

## 2022-06-06 RX ORDER — ACETAMINOPHEN 650 MG/1
325 SUPPOSITORY RECTAL EVERY 4 HOURS PRN
Status: DISCONTINUED | OUTPATIENT
Start: 2022-06-06 | End: 2022-06-14

## 2022-06-06 RX ORDER — MORPHINE SULFATE 4 MG/ML
4 INJECTION, SOLUTION INTRAMUSCULAR; INTRAVENOUS EVERY 4 HOURS PRN
Status: DISCONTINUED | OUTPATIENT
Start: 2022-06-06 | End: 2022-06-14

## 2022-06-06 RX ORDER — NYSTATIN 100000 U/G
CREAM TOPICAL 2 TIMES DAILY
Status: DISCONTINUED | OUTPATIENT
Start: 2022-06-07 | End: 2022-06-08

## 2022-06-06 RX ORDER — FAMOTIDINE 10 MG/ML
20 INJECTION, SOLUTION INTRAVENOUS
Status: CANCELLED | OUTPATIENT
Start: 2022-06-07

## 2022-06-06 RX ORDER — METOPROLOL TARTRATE 5 MG/5ML
5 INJECTION INTRAVENOUS EVERY 6 HOURS PRN
Status: DISCONTINUED | OUTPATIENT
Start: 2022-06-06 | End: 2022-06-06

## 2022-06-06 RX ORDER — PROMETHAZINE HYDROCHLORIDE 25 MG/ML
25 INJECTION, SOLUTION INTRAMUSCULAR; INTRAVENOUS EVERY 6 HOURS PRN
Status: DISCONTINUED | OUTPATIENT
Start: 2022-06-06 | End: 2022-06-14

## 2022-06-06 RX ORDER — DEXTROSE, SODIUM CHLORIDE, AND POTASSIUM CHLORIDE 5; .9; .15 G/100ML; G/100ML; G/100ML
100 INJECTION INTRAVENOUS CONTINUOUS
Status: DISCONTINUED | OUTPATIENT
Start: 2022-06-06 | End: 2022-06-06

## 2022-06-06 RX ORDER — NYSTATIN 100000 U/G
CREAM TOPICAL 2 TIMES DAILY
Status: CANCELLED | OUTPATIENT
Start: 2022-06-06

## 2022-06-06 RX ORDER — HEPARIN SODIUM 1000 [USP'U]/ML
2000 INJECTION, SOLUTION INTRAVENOUS; SUBCUTANEOUS
Status: CANCELLED | OUTPATIENT
Start: 2022-06-06

## 2022-06-06 RX ORDER — ALBUTEROL SULFATE 2.5 MG/3ML
2.5 SOLUTION RESPIRATORY (INHALATION) EVERY 6 HOURS PRN
Status: DISCONTINUED | OUTPATIENT
Start: 2022-06-06 | End: 2022-06-06

## 2022-06-06 RX ORDER — PANTOPRAZOLE SODIUM 40 MG/1
40 INJECTION, POWDER, FOR SOLUTION INTRAVENOUS
Status: CANCELLED | OUTPATIENT
Start: 2022-06-06

## 2022-06-06 RX ORDER — HEPARIN SODIUM 1000 [USP'U]/ML
4000 INJECTION, SOLUTION INTRAVENOUS; SUBCUTANEOUS
Status: CANCELLED | OUTPATIENT
Start: 2022-06-06

## 2022-06-06 RX ORDER — SODIUM CHLORIDE FOR INHALATION 0.9 %
3 VIAL, NEBULIZER (ML) INHALATION
Status: DISCONTINUED | OUTPATIENT
Start: 2022-06-07 | End: 2022-06-14

## 2022-06-06 RX ADMIN — HEPARIN SODIUM AND DEXTROSE 25.1 UNITS/KG/HR: 10000; 5 INJECTION INTRAVENOUS at 04:47

## 2022-06-06 RX ADMIN — DEXTROSE, SODIUM CHLORIDE, AND POTASSIUM CHLORIDE 100 ML/HR: 5; .45; .15 INJECTION INTRAVENOUS at 23:52

## 2022-06-06 RX ADMIN — FAMOTIDINE 20 MG: 10 INJECTION, SOLUTION INTRAVENOUS at 23:24

## 2022-06-06 RX ADMIN — DEXTROSE, SODIUM CHLORIDE, AND POTASSIUM CHLORIDE 100 ML/HR: 5; .9; .15 INJECTION INTRAVENOUS at 23:17

## 2022-06-06 RX ADMIN — SODIUM CHLORIDE 75 ML/HR: 0.9 INJECTION, SOLUTION INTRAVENOUS at 22:21

## 2022-06-06 RX ADMIN — BUDESONIDE AND FORMOTEROL FUMARATE DIHYDRATE 2 PUFF: 160; 4.5 AEROSOL RESPIRATORY (INHALATION) at 07:46

## 2022-06-06 RX ADMIN — NYSTATIN: 100000 CREAM TOPICAL at 17:09

## 2022-06-06 RX ADMIN — Medication: at 09:48

## 2022-06-06 RX ADMIN — Medication: at 17:09

## 2022-06-06 RX ADMIN — MORPHINE SULFATE 2 MG: 2 INJECTION, SOLUTION INTRAMUSCULAR; INTRAVENOUS at 15:21

## 2022-06-06 RX ADMIN — MORPHINE SULFATE 4 MG: 4 INJECTION INTRAVENOUS at 09:47

## 2022-06-06 RX ADMIN — ONDANSETRON 4 MG: 2 INJECTION INTRAMUSCULAR; INTRAVENOUS at 09:47

## 2022-06-06 RX ADMIN — MORPHINE SULFATE 2 MG: 2 INJECTION, SOLUTION INTRAMUSCULAR; INTRAVENOUS at 21:15

## 2022-06-06 RX ADMIN — SODIUM CHLORIDE 75 ML/HR: 0.9 INJECTION, SOLUTION INTRAVENOUS at 09:48

## 2022-06-06 RX ADMIN — FAMOTIDINE 20 MG: 10 INJECTION, SOLUTION INTRAVENOUS at 09:48

## 2022-06-06 RX ADMIN — FLUTICASONE PROPIONATE 1 SPRAY: 50 SPRAY, METERED NASAL at 09:48

## 2022-06-06 RX ADMIN — METOPROLOL TARTRATE 2.5 MG: 1 INJECTION, SOLUTION INTRAVENOUS at 23:22

## 2022-06-06 RX ADMIN — HEPARIN SODIUM 2000 UNITS: 1000 INJECTION INTRAVENOUS; SUBCUTANEOUS at 02:28

## 2022-06-06 RX ADMIN — MORPHINE SULFATE 4 MG: 4 INJECTION INTRAVENOUS at 03:05

## 2022-06-06 RX ADMIN — HEPARIN SODIUM AND DEXTROSE 25.1 UNITS/KG/HR: 10000; 5 INJECTION INTRAVENOUS at 21:18

## 2022-06-06 RX ADMIN — MORPHINE SULFATE 4 MG: 4 INJECTION INTRAVENOUS at 19:26

## 2022-06-06 RX ADMIN — HEPARIN SODIUM AND DEXTROSE 25.1 UNITS/KG/HR: 10000; 5 INJECTION INTRAVENOUS at 16:57

## 2022-06-06 RX ADMIN — NYSTATIN: 100000 CREAM TOPICAL at 09:48

## 2022-06-06 NOTE — ASSESSMENT & PLAN NOTE
· Encouraged diet and weight modification    · H/o gastric bypass  · Lives at SNF for assistance with ADLs

## 2022-06-06 NOTE — CASE MANAGEMENT
Case Management Progress Note    Patient name Ethyl [facility]  Location /-67 MRN [id number]  : 1970 Date 2022       LOS (days): 2  Geometric Mean LOS (GMLOS) (days):   Days to GMLOS:        OBJECTIVE:        Current admission status: Inpatient  Preferred Pharmacy:   [facility]/pharmacy #[id number]- [address] [address]  [address]  [address] [place] [address], [address] [facility]  Phone: [phone] Fax: [phone]    Primary Care Provider: Beth Rhoades MD    [facility]  Secondary Insurance:     PROGRESS NOTE:  Patient is transferring to [facility] for surgery, needs ICU backup  Secured email sent to IP CM and nursing team to alert of transfer and needs

## 2022-06-06 NOTE — DISCHARGE SUMMARY
[address] [address]  Discharge- Serina Caraballo 1970, 47 y o  male MRN: [id number]  Unit/Bed#: -01 Encounter: [id number]  Primary Care Provider: Lan Edwards MD   Date and time admitted to hospital: 6/4/2022 12:08 AM    * SBO  Assessment & Plan  · Presented with worsening abdominal pain with constipation however still passing very small amount of gas  · CT AP showing SBO in proximal jejunum with transitional point  · Serial obstruction series x-ray abdomen showing persistent finding  · S/p NG tube decompression and suction  · H/o ventral hernia repair with laparotomy in 2019  Surgery following  Requested transfer to [facility] due to persistent SBO, inpatient Surgical Services and admission to critical care if needed  Pt accepted by Dr Teresita Gaines  Continue NG tube  NPO  Heparin drip  Hold Pradaxa last dose on 6/2/22  Pain management with morphine  Serial abdominal exams  IV fluid 75 cc/hour    History of DVT (deep vein thrombosis)  Assessment & Plan  · On Pradaxa for AFib  Currently on hold  Heparin drip  Hypothyroidism  Assessment & Plan  Currently home levothyroxine on hold  Restart when able to tolerate oral diet  Chronic diastolic heart failure (HCC)  Assessment & Plan  · Currently not in exacerbation  Baseline oxygen requirement  Fluid status difficult to assess given morbid obesity  · Continue daily weight, I's and O's  · Hold diuretic and continue IV fluid given NPO status  Obesity hypoventilation syndrome/FILIPPO  Assessment & Plan  · 2-4L O2 NC at baseline  · Continue daily inhalers  · CPAP QHS    Morbid obesity (HCC)  Assessment & Plan  · Encouraged diet and weight modification  · H/o gastric bypass  · Lives at SNF for assistance with ADLs    A-fib [facility])  Assessment & Plan  · Currently under control  On Pradaxa with last dose on 6/2/22  · Currently NPO Will continue metoprolol IV 5 mg 6 hourly p r n   For HTN and tachycardia with holding parameters  · Hold Pradaxa, continue IV heparin in need for OR      Discharging Resident Physician: Lisa Álvarez MD  Attending: Bozena Brady MD  PCP: Adriano Swain MD  Admission Date: 6/4/2022  Admission Date:   Admission Orders (From admission, onward)     Ordered        06/04/22 0434  INPATIENT ADMISSION  Once                      Discharge Date: 06/06/22    Disposition:     Other: SLW    Reason for Admission: SBO    Consultations During Hospital Stay:  · Surgery    Procedures Performed:     No orders of the defined types were placed in this encounter  Diagnosis:    Medical Problems             Resolved Problems  Date Reviewed: 6/5/2022   None                 Principal Problem:    SBO  Active Problems:    A-fib (HCC)    Morbid obesity (HCC)    Obesity hypoventilation syndrome/FILIPPO    Chronic diastolic heart failure (HCC)    Hypothyroidism    History of DVT (deep vein thrombosis)      Significant Findings / Test Results:     · SBO on CT AP    Incidental Findings:   · None     Hospital Course:     Ranulfo Terrazas is a 47 y o  male patient who originally presented to the hospital on 6/4/2022 due to worsening abdominal pain and constipation however was passing very small amount of gas  CT AP noted to have small-bowel obstruction with proximal jejunum with transition point  Received abdominal x-ray obstructive series shown to have persistent finding  Initially managed with NG tube decompression and suction  Does have H/o ventral hernia repair with laparotomy in 2019  General surgery was consulted who recommended transfer to [facility] regarding need for critical care services if needed and surgical care services inpatient  Pt was accepted by [facility]  Was having H/o AFib was on Pradaxa which was held and heparin drip was initiated while inpatient regarding need for OR if needed  Pain management with morphine was done  IV hydration were continued    Does have H/o COPD with baseline 2-4 L oxygen requirement at home and have FILIPPO/OHS requiring CPAP at night  See assessment plan as above for further detail  Condition at Discharge: good     Discharge Day Visit / Exam:     Subjective:  Pt seen and examined  Did not have any BM but passing very small amount of gas  Persistent abdominal pain  Hemodynamically stable and afebrile  On 2-3 L overnight  Vitals: Blood Pressure: 99/58 (06/06/22 0734)  Pulse: 78 (06/06/22 0734)  Temperature: 98 8 °F (37 1 °C) (06/06/22 0734)  Temp Source: Tympanic (06/06/22 0734)  Respirations: 16 (06/06/22 0734)  Height: 5' 11" (180 3 cm) (06/04/22 0010)  Weight - Scale: (!) 226 kg (498 lb 3 8 oz) (06/06/22 0600)  SpO2: 97 % (06/06/22 0746)  Exam:   Physical Exam  Vitals reviewed  Constitutional:       General: He is not in acute distress  Appearance: He is well-developed  He is obese  HENT:      Head: Normocephalic and atraumatic  Eyes:      General: No scleral icterus  Right eye: No discharge  Left eye: No discharge  Cardiovascular:      Rate and Rhythm: Normal rate and regular rhythm  Pulses: Normal pulses  Heart sounds: Normal heart sounds  Pulmonary:      Effort: Pulmonary effort is normal  No respiratory distress  Breath sounds: Normal breath sounds  No wheezing or rales  Chest:      Chest wall: No tenderness  Abdominal:      General: Bowel sounds are normal  There is distension  Palpations: Abdomen is soft  Tenderness: There is abdominal tenderness (Generalized)  Musculoskeletal:         General: No swelling or tenderness  Normal range of motion  Cervical back: Normal range of motion  Right lower leg: No edema  Left lower leg: No edema  Skin:     General: Skin is warm  Coloration: Skin is not pale  Neurological:      General: No focal deficit present  Mental Status: He is alert and oriented to person, place, and time  Mental status is at baseline  Cranial Nerves: No cranial nerve deficit        Sensory: No sensory deficit  Motor: No weakness  Psychiatric:         Mood and Affect: Mood normal          Behavior: Behavior normal        Discussion with Family: None    Discharge instructions/Information to patient and family:   See after visit summary for information provided to patient and family  Provisions for Follow-Up Care:  See after visit summary for information related to follow-up care and any pertinent home health orders  Planned Readmission: SLW     Discharge Medications:  See after visit summary for reconciled discharge medications provided to patient and family  Discharge Statement :  I spent 25 minutes discharging the patient  This time was spent on the day of discharge  I had direct contact with the patient on the day of discharge  Additional documentation is required if more than 30 minutes were spent on discharge           ** Please Note: This note has been constructed using a voice recognition system **

## 2022-06-06 NOTE — PROGRESS NOTES
Progress Note - General Surgery   Kenney Alert 47 y o  male MRN: [id number]  Unit/Bed#: -01 Encounter: [id number]    Assessment/Plan    SBO  Afebrile, a few episodes of tachycardia in 110s in past 24hrs, rest of vital signs stable on 3L NC (pt on 2L NC at home)  Labs reviewed - stable  I: 482cc  UO: 400cc    Discussed with attending and primary team  Recommend transfer to [address] [place] [address] as pt will require ICU support following any potential surgery due to multiple preexisting comorbidities  Repeat obstruction series ordered  Continue NPO  Continue NGT to wall suction  SQH for DVT prophylaxis      BP 99/58 (BP Location: Right arm)   Pulse 78   Temp 98 8 °F (37 1 °C) (Tympanic)   Resp 16   Ht 5' 11" (1 803 m)   Wt (!) 226 kg (498 lb 3 8 oz)   SpO2 97%   BMI 69 49 kg/m²     Results from last 7 days   Lab Units 06/06/22  0520   WBC Thousand/uL 6 21   HEMOGLOBIN g/dL 12 6   HEMATOCRIT % 40 2   PLATELETS Thousands/uL 212     Results from last 7 days   Lab Units 06/06/22  0520   POTASSIUM mmol/L 3 9   CHLORIDE mmol/L 93*   CO2 mmol/L 38*   BUN mg/dL 15   CREATININE mg/dL 0 83           Intake/Output Summary (Last 24 hours) at 6/6/2022 0818  Last data filed at 6/5/2022 2300  Gross per 24 hour   Intake 482 5 ml   Output 400 ml   Net 82 5 ml           Subjective/Objective     Subjective: Patient seen and examined  No acute events overnight  Pt reports central abdominal discomfort, unchanged, and continued nausea with no vomiting  Minimal tenderness with palpation of abdomen in area of ventral hernia  NGT to wall suction in place with bilious output in tubing, nothing in canister  Pt denies bowel movement but is passing flatus  Denies CP, SOB, fever/chills, calf tenderness  Review of Systems   Constitutional: Negative for chills and fever  Respiratory: Negative for cough and shortness of breath  Cardiovascular: Negative for chest pain     Gastrointestinal: Positive for abdominal pain (central abdomen) and nausea  Negative for vomiting  Genitourinary: Negative for difficulty urinating and dysuria  Objective:     Physical Exam  Constitutional:       General: He is not in acute distress  Appearance: He is obese  He is not toxic-appearing  HENT:      Head: Normocephalic and atraumatic  Nose:      Comments: NGT in place to suction, bilious output in tubing  Eyes:      Extraocular Movements: Extraocular movements intact  Conjunctiva/sclera: Conjunctivae normal    Cardiovascular:      Rate and Rhythm: Normal rate and regular rhythm  Heart sounds: Normal heart sounds  No murmur heard  No friction rub  No gallop  Pulmonary:      Effort: Pulmonary effort is normal  No respiratory distress  Breath sounds: No stridor  Decreased breath sounds present  No wheezing, rhonchi or rales  Comments: 3L NC  Abdominal:      General: A surgical scar is present  Bowel sounds are normal       Palpations: Abdomen is soft  Tenderness: There is abdominal tenderness in the periumbilical area  There is no guarding  Hernia: A hernia is present  Hernia is present in the ventral area  Comments: Morbidly obese abdomen   Musculoskeletal:      Cervical back: Normal range of motion  Right lower leg: Edema present  Left lower leg: Edema present  Skin:     General: Skin is warm and dry  Neurological:      Mental Status: He is alert and oriented to person, place, and time  Psychiatric:         Mood and Affect: Mood normal          Behavior: Behavior normal          Thought Content:  Thought content normal             Genia Sacks, PA-C  6/6/2022

## 2022-06-06 NOTE — NURSING NOTE
Multiple attempts made to draw blood for PTT by Nurses and Doctor but unsuccessful  As per attending physician continue heparin drip at 25 1units/kg/hr

## 2022-06-06 NOTE — PLAN OF CARE
Problem: MOBILITY - ADULT  Goal: Maintain or return to baseline ADL function  Description: INTERVENTIONS:  -  Assess patient's ability to carry out ADLs; assess patient's baseline for ADL function and identify physical deficits which impact ability to perform ADLs (bathing, care of mouth/teeth, toileting, grooming, dressing, etc )  - Assess/evaluate cause of self-care deficits   - Assess range of motion  - Assess patient's mobility; develop plan if impaired  - Assess patient's need for assistive devices and provide as appropriate  - Encourage maximum independence but intervene and supervise when necessary  - Involve family in performance of ADLs  - Assess for home care needs following discharge   - Consider OT consult to assist with ADL evaluation and planning for discharge  - Provide patient education as appropriate  Outcome: Progressing  Goal: Maintains/Returns to pre admission functional level  Description: INTERVENTIONS:  - Perform BMAT or MOVE assessment daily    - Set and communicate daily mobility goal to care team and patient/family/caregiver  - Collaborate with rehabilitation services on mobility goals if consulted  - Perform Range of Motion 3 times a day  - Reposition patient every 2  hours    - Dangle patient 3  times a day  - Out of bed to chair 3  times a day   - Out of bed for meals 3   Problem: GASTROINTESTINAL - ADULT  Goal: Minimal or absence of nausea and/or vomiting  Description: INTERVENTIONS:  - Administer IV fluids if ordered to ensure adequate hydration  - Maintain NPO status until nausea and vomiting are resolved  - Nasogastric tube if ordered  - Administer ordered antiemetic medications as needed  - Provide nonpharmacologic comfort measures as appropriate  - Advance diet as tolerated, if ordered  - Consider nutrition services referral to assist patient with adequate nutrition and appropriate food choices  Outcome: Progressing  Goal: Maintains or returns to baseline bowel function  Description: INTERVENTIONS:  - Assess bowel function  - Encourage oral fluids to ensure adequate hydration  - Administer IV fluids if ordered to ensure adequate hydration  - Administer ordered medications as needed  - Encourage mobilization and activity  - Consider nutritional services referral to assist patient with adequate nutrition and appropriate food choices  Outcome: Progressing  Goal: Maintains adequate nutritional intake  Description: INTERVENTIONS:  - Monitor percentage of each meal consumed  - Identify factors contributing to decreased intake, treat as appropriate  - Assist with meals as needed  - Monitor I&O, weight, and lab values if indicated  - Obtain nutrition services referral as needed  Outcome: Progressing     Goal: Oral mucous membranes remain intact  Description: INTERVENTIONS  - Assess oral mucosa and hygiene practices  - Implement preventative oral hygiene regimen  - Implement oral medicated treatments as ordered  - Initiate Nutrition services referral as needed  Outcome: Progressing   times a day  - Out of bed for toileting  - Record patient progress and toleration of activity level   Outcome: Progressing    Problem: Prexisting or High Potential for Compromised Skin Integrity  Goal: Skin integrity is maintained or improved  Description: INTERVENTIONS:  - Identify patients at risk for skin breakdown  - Assess and monitor skin integrity  - Assess and monitor nutrition and hydration status  - Monitor labs   - Assess for incontinence   - Turn and reposition patient  - Assist with mobility/ambulation  - Relieve pressure over bony prominences  - Avoid friction and shearing  - Provide appropriate hygiene as needed including keeping skin clean and dry  - Evaluate need for skin moisturizer/barrier cream  - Collaborate with interdisciplinary team   - Patient/family teaching   Outcome: Progressing     Problem: Potential for Falls  Goal: Patient will remain free of falls  Description: INTERVENTIONS:  - Educate patient/family on patient safety including physical limitations  - Instruct patient to call for assistance with activity   - Consult OT/PT to assist with strengthening/mobility   - Keep Call bell within reach  - Keep bed low and locked with side rails adjusted as appropriate  - Keep care items and personal belongings within reach  - Initiate and maintain comfort rounds  - Make Fall Risk Sign visible to staff  - Offer Toileting every 2  Hours, in advance of need  - Initiate/Maintain bed  and chair alarm  - Apply yellow socks and bracelet for high fall risk patients  - Consider moving patient to room near nurses station  Outcome: Progressing - - -

## 2022-06-06 NOTE — ASSESSMENT & PLAN NOTE
· Currently not in exacerbation  Baseline oxygen requirement  Fluid status difficult to assess given morbid obesity  · Continue daily weight, I's and O's  · Hold diuretic and continue IV fluid given NPO status

## 2022-06-06 NOTE — ASSESSMENT & PLAN NOTE
· Presented with worsening abdominal pain with constipation however still passing very small amount of gas  · CT AP showing SBO in proximal jejunum with transitional point  · Serial obstruction series x-ray abdomen showing persistent finding  · S/p NG tube decompression and suction  · H/o ventral hernia repair with laparotomy in 2019  Surgery following  Requested transfer to [facility] due to persistent SBO, inpatient Surgical Services and admission to critical care if needed  Pt accepted by Dr Ye Forte    Continue NG tube  NPO  Heparin drip  Hold Pradaxa last dose on 6/2/22  Pain management with morphine  Serial abdominal exams  IV fluid 75 cc/hour

## 2022-06-06 NOTE — ASSESSMENT & PLAN NOTE
· Currently under control  On Pradaxa with last dose on 6/2/22  · Currently NPO Will continue metoprolol IV 5 mg 6 hourly p r n   For HTN and tachycardia with holding parameters  · Hold Pradaxa, continue IV heparin in need for OR

## 2022-06-07 ENCOUNTER — APPOINTMENT (INPATIENT)
Dept: RADIOLOGY | Facility: HOSPITAL | Age: 52
DRG: 224 | End: 2022-06-07
Payer: COMMERCIAL

## 2022-06-07 LAB
ANION GAP SERPL CALCULATED.3IONS-SCNC: 4 MMOL/L (ref 4–13)
APTT PPP: 43 SECONDS (ref 23–37)
APTT PPP: 49 SECONDS (ref 23–37)
APTT PPP: 72 SECONDS (ref 23–37)
BUN SERPL-MCNC: 13 MG/DL (ref 5–25)
CALCIUM SERPL-MCNC: 9.3 MG/DL (ref 8.3–10.1)
CHLORIDE SERPL-SCNC: 99 MMOL/L (ref 100–108)
CO2 SERPL-SCNC: 41 MMOL/L (ref 21–32)
CREAT SERPL-MCNC: 0.78 MG/DL (ref 0.6–1.3)
EST. AVERAGE GLUCOSE BLD GHB EST-MCNC: 114 MG/DL
GFR SERPL CREATININE-BSD FRML MDRD: 104 ML/MIN/1.73SQ M
GLUCOSE SERPL-MCNC: 122 MG/DL (ref 65–140)
HBA1C MFR BLD: 5.6 %
MAGNESIUM SERPL-MCNC: 2.5 MG/DL (ref 1.6–2.6)
MRSA NOSE QL CULT: ABNORMAL
MRSA NOSE QL CULT: ABNORMAL
POTASSIUM SERPL-SCNC: 3.8 MMOL/L (ref 3.5–5.3)
SODIUM SERPL-SCNC: 144 MMOL/L (ref 136–145)

## 2022-06-07 PROCEDURE — 99232 SBSQ HOSP IP/OBS MODERATE 35: CPT | Performed by: FAMILY MEDICINE

## 2022-06-07 PROCEDURE — 80048 BASIC METABOLIC PNL TOTAL CA: CPT | Performed by: NURSE PRACTITIONER

## 2022-06-07 PROCEDURE — 94760 N-INVAS EAR/PLS OXIMETRY 1: CPT

## 2022-06-07 PROCEDURE — 94640 AIRWAY INHALATION TREATMENT: CPT

## 2022-06-07 PROCEDURE — 94664 DEMO&/EVAL PT USE INHALER: CPT

## 2022-06-07 PROCEDURE — 83036 HEMOGLOBIN GLYCOSYLATED A1C: CPT | Performed by: NURSE PRACTITIONER

## 2022-06-07 PROCEDURE — 99024 POSTOP FOLLOW-UP VISIT: CPT | Performed by: SPECIALIST

## 2022-06-07 PROCEDURE — 83735 ASSAY OF MAGNESIUM: CPT | Performed by: NURSE PRACTITIONER

## 2022-06-07 PROCEDURE — 85730 THROMBOPLASTIN TIME PARTIAL: CPT | Performed by: FAMILY MEDICINE

## 2022-06-07 RX ORDER — METOPROLOL TARTRATE 5 MG/5ML
2.5 INJECTION INTRAVENOUS ONCE
Status: COMPLETED | OUTPATIENT
Start: 2022-06-07 | End: 2022-06-07

## 2022-06-07 RX ORDER — METOPROLOL TARTRATE 5 MG/5ML
5 INJECTION INTRAVENOUS EVERY 6 HOURS
Status: DISCONTINUED | OUTPATIENT
Start: 2022-06-08 | End: 2022-06-08

## 2022-06-07 RX ORDER — HEPARIN SODIUM 10000 [USP'U]/100ML
3-35 INJECTION, SOLUTION INTRAVENOUS
Status: DISCONTINUED | OUTPATIENT
Start: 2022-06-07 | End: 2022-06-13

## 2022-06-07 RX ADMIN — MORPHINE SULFATE 2 MG: 2 INJECTION, SOLUTION INTRAMUSCULAR; INTRAVENOUS at 11:30

## 2022-06-07 RX ADMIN — NYSTATIN: 100000 CREAM TOPICAL at 08:43

## 2022-06-07 RX ADMIN — LEVALBUTEROL HYDROCHLORIDE 1.25 MG: 1.25 SOLUTION, CONCENTRATE RESPIRATORY (INHALATION) at 19:34

## 2022-06-07 RX ADMIN — ISODIUM CHLORIDE 3 ML: 0.03 SOLUTION RESPIRATORY (INHALATION) at 13:05

## 2022-06-07 RX ADMIN — LEVALBUTEROL HYDROCHLORIDE 1.25 MG: 1.25 SOLUTION, CONCENTRATE RESPIRATORY (INHALATION) at 13:05

## 2022-06-07 RX ADMIN — HEPARIN SODIUM 27.1 UNITS/KG/HR: 10000 INJECTION, SOLUTION INTRAVENOUS at 15:06

## 2022-06-07 RX ADMIN — BUDESONIDE AND FORMOTEROL FUMARATE DIHYDRATE 2 PUFF: 160; 4.5 AEROSOL RESPIRATORY (INHALATION) at 10:40

## 2022-06-07 RX ADMIN — Medication: at 17:19

## 2022-06-07 RX ADMIN — DEXTROSE, SODIUM CHLORIDE, AND POTASSIUM CHLORIDE 100 ML/HR: 5; .45; .15 INJECTION INTRAVENOUS at 09:57

## 2022-06-07 RX ADMIN — ISODIUM CHLORIDE 3 ML: 0.03 SOLUTION RESPIRATORY (INHALATION) at 07:15

## 2022-06-07 RX ADMIN — METOPROLOL TARTRATE 2.5 MG: 1 INJECTION, SOLUTION INTRAVENOUS at 22:53

## 2022-06-07 RX ADMIN — FLUTICASONE PROPIONATE 1 SPRAY: 50 SPRAY, METERED NASAL at 08:44

## 2022-06-07 RX ADMIN — Medication: at 08:42

## 2022-06-07 RX ADMIN — METOPROLOL TARTRATE 2.5 MG: 1 INJECTION, SOLUTION INTRAVENOUS at 21:15

## 2022-06-07 RX ADMIN — FAMOTIDINE 20 MG: 10 INJECTION, SOLUTION INTRAVENOUS at 08:44

## 2022-06-07 RX ADMIN — MORPHINE SULFATE 2 MG: 2 INJECTION, SOLUTION INTRAMUSCULAR; INTRAVENOUS at 20:10

## 2022-06-07 RX ADMIN — DEXTROSE, SODIUM CHLORIDE, AND POTASSIUM CHLORIDE 100 ML/HR: 5; .45; .15 INJECTION INTRAVENOUS at 20:13

## 2022-06-07 RX ADMIN — BUDESONIDE AND FORMOTEROL FUMARATE DIHYDRATE 2 PUFF: 160; 4.5 AEROSOL RESPIRATORY (INHALATION) at 20:44

## 2022-06-07 RX ADMIN — FAMOTIDINE 20 MG: 10 INJECTION, SOLUTION INTRAVENOUS at 20:11

## 2022-06-07 RX ADMIN — METOPROLOL TARTRATE 2.5 MG: 1 INJECTION, SOLUTION INTRAVENOUS at 05:05

## 2022-06-07 RX ADMIN — HEPARIN SODIUM 2000 UNITS: 1000 INJECTION INTRAVENOUS; SUBCUTANEOUS at 16:49

## 2022-06-07 RX ADMIN — MORPHINE SULFATE 4 MG: 4 INJECTION INTRAVENOUS at 23:44

## 2022-06-07 RX ADMIN — MORPHINE SULFATE 2 MG: 2 INJECTION, SOLUTION INTRAMUSCULAR; INTRAVENOUS at 16:09

## 2022-06-07 RX ADMIN — HEPARIN SODIUM 2000 UNITS: 1000 INJECTION INTRAVENOUS; SUBCUTANEOUS at 23:59

## 2022-06-07 RX ADMIN — METOPROLOL TARTRATE 2.5 MG: 1 INJECTION, SOLUTION INTRAVENOUS at 11:17

## 2022-06-07 RX ADMIN — LEVALBUTEROL HYDROCHLORIDE 1.25 MG: 1.25 SOLUTION, CONCENTRATE RESPIRATORY (INHALATION) at 07:14

## 2022-06-07 RX ADMIN — HEPARIN SODIUM 27.1 UNITS/KG/HR: 10000 INJECTION, SOLUTION INTRAVENOUS at 03:37

## 2022-06-07 RX ADMIN — METOPROLOL TARTRATE 2.5 MG: 1 INJECTION, SOLUTION INTRAVENOUS at 16:08

## 2022-06-07 RX ADMIN — ISODIUM CHLORIDE 3 ML: 0.03 SOLUTION RESPIRATORY (INHALATION) at 19:34

## 2022-06-07 RX ADMIN — FUROSEMIDE 20 MG: 10 INJECTION, SOLUTION INTRAMUSCULAR; INTRAVENOUS at 08:46

## 2022-06-07 RX ADMIN — NYSTATIN: 100000 CREAM TOPICAL at 17:19

## 2022-06-07 RX ADMIN — MORPHINE SULFATE 2 MG: 2 INJECTION, SOLUTION INTRAMUSCULAR; INTRAVENOUS at 06:27

## 2022-06-07 RX ADMIN — BUPROPION 450 MG: 150 TABLET, EXTENDED RELEASE ORAL at 08:46

## 2022-06-07 RX ADMIN — HEPARIN SODIUM 2000 UNITS: 1000 INJECTION INTRAVENOUS; SUBCUTANEOUS at 00:45

## 2022-06-07 NOTE — ASSESSMENT & PLAN NOTE
Patient presented to [place] ED on 6/4 for abdominal pain nausea vomiting for 1 day  CT showed small bowel obstruction at the proximal jejunum with transition point at the left upper abdomen  History of cholecystectomy, hernia repair, gastric bypass  Had SBO 1 year ago, required surgery per patient  Patient transferred to [place] due to needing ICU support following any potential surgery due to multiple comorbidities  · Continue NG tube to suction  · NPO  · Surgery consulted and appreciate their input  · Recommend repeat CT scan with oral contrast (via NG tube) to determine if obstruction is clearing

## 2022-06-07 NOTE — ASSESSMENT & PLAN NOTE
Wt Readings from Last 3 Encounters:   06/07/22 (!) 231 kg (509 lb 14 8 oz)   06/06/22 (!) 226 kg (498 lb 3 8 oz)   06/02/22 (!) 232 kg (512 lb)     Patient is Demadex 40 p o  B i d , spirolactone 50 p o  Daily from nursing home  2D echo May 2021 showed normal EF  · Hold Demadex and spironolactone due to NPO    · IV Lasix 20 daily   · Continue IV hydration  · Daily weight and I&Os

## 2022-06-07 NOTE — ASSESSMENT & PLAN NOTE
Patient presented to [place] ED on 6/4 for abdominal pain nausea vomiting for 1 day  CT showed small bowel obstruction at the proximal jejunum with transition point at the left upper abdomen  History of cholecystectomy, hernia repair, gastric bypass  Had SBO 1 year ago, required surgery per patient  Patient transferred to [place] due to needing ICU support following any potential surgery due to multiple comorbidities    · Continue NG tube to suction  · NPO  · GI prophylaxis  · IV hydration  · Pain control  · Consult surgery

## 2022-06-07 NOTE — ASSESSMENT & PLAN NOTE
Lab Results   Component Value Date    HGBA1C 5 4 03/21/2022       History of diabetes  A1c 5 4 about 3 months ago    Not on diabetic medications in nursing home  Repeat A1c

## 2022-06-07 NOTE — PLAN OF CARE
Problem: RESPIRATORY - ADULT  Goal: Achieves optimal ventilation and oxygenation  Description: INTERVENTIONS:  - Assess for changes in respiratory status  - Assess for changes in mentation and behavior  - Position to facilitate oxygenation and minimize respiratory effort  - Oxygen administered by appropriate delivery if ordered  - Initiate smoking cessation education as indicated  - Encourage broncho-pulmonary hygiene including cough, deep breathe, Incentive Spirometry  - Assess the need for suctioning and aspirate as needed  - Assess and instruct to report SOB or any respiratory difficulty  - Respiratory Therapy support as indicated  Outcome: Progressing     Problem: MOBILITY - ADULT  Goal: Maintain or return to baseline ADL function  Description: INTERVENTIONS:  -  Assess patient's ability to carry out ADLs; assess patient's baseline for ADL function and identify physical deficits which impact ability to perform ADLs (bathing, care of mouth/teeth, toileting, grooming, dressing, etc )  - Assess/evaluate cause of self-care deficits   - Assess range of motion  - Assess patient's mobility; develop plan if impaired  - Assess patient's need for assistive devices and provide as appropriate  - Encourage maximum independence but intervene and supervise when necessary  - Involve family in performance of ADLs  - Assess for home care needs following discharge   - Consider OT consult to assist with ADL evaluation and planning for discharge  - Provide patient education as appropriate  Outcome: Progressing  Goal: Maintains/Returns to pre admission functional level  Description: INTERVENTIONS:  - Perform BMAT or MOVE assessment daily    - Set and communicate daily mobility goal to care team and patient/family/caregiver     - Collaborate with rehabilitation services on mobility goals if consulted    Problem: Prexisting or High Potential for Compromised Skin Integrity  Goal: Skin integrity is maintained or improved  Description: INTERVENTIONS:  - Identify patients at risk for skin breakdown  - Assess and monitor skin integrity  - Assess and monitor nutrition and hydration status  - Monitor labs   - Assess for incontinence   - Turn and reposition patient  - Assist with mobility/ambulation  - Relieve pressure over bony prominences  - Avoid friction and shearing  - Provide appropriate hygiene as needed including keeping skin clean and dry  - Evaluate need for skin moisturizer/barrier cream  - Collaborate with interdisciplinary team   - Patient/family teaching  - Consider wound care consult   Outcome: Progressing   - Out of bed for toileting  - Record patient progress and toleration of activity level   Outcome: Progressing

## 2022-06-07 NOTE — ASSESSMENT & PLAN NOTE
On oxygen 2 L chronically  Likely due to obesity hypoventilation syndrome, COPD  Normal PA pressure on echo in 5/2021  On DuoNeb t i d , Symbicort in nursing home  · Continue oxygen 2 L during the day  · CPAP at HS on room air  · Continue Symbicort  · Change DuoNeb to Xopenex t i d  And Xopenex p r n

## 2022-06-07 NOTE — UTILIZATION REVIEW
Initial Clinical Review    Admission: Date/Time/Statement:   Admission Orders (From admission, onward)     Ordered        06/06/22 2302  Inpatient Admission  Once                      Orders Placed This Encounter   Procedures    Inpatient Admission     Standing Status:   Standing     Number of Occurrences:   1     Order Specific Question:   Level of Care     Answer:   Med Surg [16]     Order Specific Question:   Estimated length of stay     Answer:   More than 2 Midnights     Order Specific Question:   Certification     Answer:   I certify that inpatient services are medically necessary for this patient for a duration of greater than two midnights  See H&P and MD Progress Notes for additional information about the patient's course of treatment  Initial Presentation: transferred from [facility]   47 y o  male with PMH of bowel obstruction, CHF, AFib, DVT, chronic respiratory failure, COPD, obesity hypoventilation syndrome, chronic respiratory failure, hypothyroid, hypertension, morbid obesity, gastric bypass, cholecystectomy, hernia repair who presents with SBO  Patient [facility] on 6/4 for abdominal pain nausea vomiting, CT showed small bowel obstruction  Patient has been on NG tube to suction in SLE  Patient transferred to [facility] due to potential need for surgery and would require ICU stay postop due to multiple comorbidities  Presents with mid upper abdominal pain currently, constant pain, sharp  Pt on IV Lasix, IV PPI< Heparin gtt & IV Metoprolol for Afib, hx DVT while NPO, Pradaxa on hold  Admitted to inpatient status for SBO  Date: 6/7/22   Day 2:   SBO with continued significant output from NGT  Abdomen: Soft, tenderness in the upper quadrants, active bowel sounds, soft and reducible ventral hernia, tympanic to percussion, some tenderness with percussion  States passing little/barely any flatus  Using IV Morphine for pain control    IV Meds, IVF & IV Heparin gtt continued  Per surg: considerations for repeat CT scan with p o  contrast, continue NGT  Intake/Output Summary (Last 24 hours) at 6/7/2022 0936  Last data filed at 6/7/2022 0931  Gross per 24 hour   Intake --   Output 1400 ml   Net -1400 ml     ED Triage Vitals   Temperature Pulse Respirations Blood Pressure SpO2   06/06/22 2256 06/06/22 2256 06/07/22 0257 06/06/22 2256 06/06/22 2256   98 8 °F (37 1 °C) (!) 107 20 128/83 (!) 89 %      Temp src Heart Rate Source Patient Position - Orthostatic VS BP Location FiO2 (%)   -- -- -- -- --             Pain Score       06/06/22 2115       8          Wt Readings from Last 1 Encounters:   06/07/22 (!) 231 kg (509 lb 14 8 oz)     Additional Vital Signs:   Date/Time Temp Pulse Resp BP MAP (mmHg) SpO2 Calculated FIO2 (%) - Nasal Cannula Nasal Cannula O2 Flow Rate (L/min) O2 Device O2 Interface Device   06/07/22 08:40:51 98 8 °F (37 1 °C) 109 Abnormal  -- 134/83 100 93 % -- -- -- --   06/07/22 08:40:33 98 8 °F (37 1 °C) 109 Abnormal  19 134/83 100 93 % -- -- -- --   06/07/22 0714 -- -- -- -- -- 95 % 28 2 L/min Nasal cannula --   06/07/22 0400 -- -- -- -- -- 93 % -- -- -- Full face mask   06/07/22 02:57:45 98 4 °F (36 9 °C) 111 Abnormal  20 131/84 100 94 % -- -- -- --   06/06/22 2350 -- -- -- -- -- 92 % 40 5 L/min CPAP Full face mask   06/06/22 22:57:31 98 8 °F (37 1 °C) 63 -- 128/83 98 92 % -- -- -- --   06/06/22 22:56:51 98 8 °F (37 1 °C) 107 Abnormal  -- 128/83 98 89 % Abnormal  -- -- -- --     Pertinent Labs/Diagnostic Test Results:  6/6  Obstruction series=  Enteric tube present, tip overlying left upper quadrant of the abdomen  Examination is limited secondary to patient body habitus and underpenetration  Gaseous distention of a portion of the stomach is noted  Cholecystectomy clips noted  IVC filter noted  No evidence for acute osseous abnormality       Results from last 7 days   Lab Units 06/06/22  0520 06/05/22  0558 06/04/22  0056   WBC Thousand/uL 6 21 6 90 6 94   HEMOGLOBIN g/dL 12 6 12 5 13 5   HEMATOCRIT % 40 2 39 2 41 3   PLATELETS Thousands/uL 212 228 223   NEUTROS ABS Thousands/µL 4 43 5 23 5 13     Results from last 7 days   Lab Units 06/07/22  0620 06/06/22  2315 06/06/22  0520 06/05/22  0558 06/04/22  0056   SODIUM mmol/L 144  --  142 140 136   POTASSIUM mmol/L 3 8 3 6 3 9 4 0 4 0   CHLORIDE mmol/L 99*  --  93* 94* 90*   CO2 mmol/L 41*  --  38* 39* 40*   ANION GAP mmol/L 4  --  11 7 6   BUN mg/dL 13  --  15 15 14   CREATININE mg/dL 0 78  --  0 83 0 85 0 96   EGFR ml/min/1 73sq m 104  --  101 100 91   CALCIUM mg/dL 9 3  --  9 9 9 8 10 0   MAGNESIUM mg/dL 2 5 2 4  --  2 2  --    PHOSPHORUS mg/dL  --   --   --  4 0  --      Results from last 7 days   Lab Units 06/05/22  0558 06/04/22  0056   AST U/L 17 29   ALT U/L 18 20   ALK PHOS U/L 74 73   TOTAL PROTEIN g/dL 7 4 8 0   ALBUMIN g/dL 3 7 3 8   TOTAL BILIRUBIN mg/dL 0 54 0 60     Results from last 7 days   Lab Units 06/07/22 0620 06/06/22  0520 06/05/22  0558 06/04/22  0056   GLUCOSE RANDOM mg/dL 122 78 129 142*     Results from last 7 days   Lab Units 06/07/22  0620 06/06/22 2315 06/06/22  1605 06/04/22  1329 06/04/22  0654   PROTIME seconds  --   --   --   --  14 4   INR   --   --   --   --  1 13   PTT seconds 72* 58* 75*   < > 33    < > = values in this interval not displayed       Results from last 7 days   Lab Units 06/04/22  0056   LACTIC ACID mmol/L 1 3     Results from last 7 days   Lab Units 06/05/22  0558 06/04/22  0056   LIPASE u/L 177* 210*     Past Medical History:   Diagnosis Date    Afib ([facility] )     Anemia     Anxiety     Cancer ([facility] )     Cardiac disease     Cellulitis     COPD (chronic obstructive pulmonary disease) ([facility] )     Depression     Diabetes mellitus ([facility] )     DVT (deep venous thrombosis) (HCC)     GERD (gastroesophageal reflux disease)     HBP (high blood pressure)     Heart failure (HCC)     Hx of blood clots     Hypertension     Hypokalemia     Hypothyroid     Obesity  Psychiatric disorder      Present on Admission:   A-fib ([place] [address] )   Chronic diastolic heart failure (HCC)   Chronic respiratory failure with hypoxia (HCC)   [facility]ch[facility] obstructive pulmonary disease) ([facility])   Hypothyroidism   Morbid obesity ([place] [address] )   Obesity hypoventilation syndrome/FILIPPO   SBO   Diabetes mellitus ([place] [address] )   Hypertension    Admitting Diagnosis: SBO (small bowel obstruction) ([place] [address] ) [[id number]]  Age/Sex: 47 y o  male  Admission Orders:  Telemetry  O2 to keep sats>90%  NGT to suction  Scd/foot pumps  Consult surgery    Scheduled Medications:  ammonium lactate, , Topical, BID  budesonide-formoterol, 2 puff, Inhalation, BID  buPROPion, 450 mg, Oral, Daily  famotidine, 20 mg, Intravenous, Q12H [facility]  fluticasone, 1 spray, Each Nare, Daily  furosemide, 20 mg, Intravenous, Daily  levalbuterol, 1 25 mg, Nebulization, TID   And  sodium chloride, 3 mL, Nebulization, TID  metoprolol, 2 5 mg, Intravenous, Q6H  nystatin, , Topical, BID    Continuous IV Infusions:  dextrose 5 % and sodium chloride 0 45 % with KCl 20 mEq/L, 100 mL/hr, Intravenous, Continuous  heparin (porcine), 3-25 Units/kg/hr (Order-Specific), Intravenous, Titrated    PRN Meds:  acetaminophen, 325 mg, Rectal, Q4H PRN  heparin (porcine), 2,000 Units, Intravenous, Q1H PRN  heparin (porcine), 4,000 Units, Intravenous, Q1H PRN  levalbuterol, 0 63 mg, Nebulization, Q4H PRN  morphine injection, 4 mg, Intravenous, Q4H PRN  morphine injection, 2 mg, Intravenous, Q4H PRN, 6/6 x1, 6/7 x1  ondansetron, 4 mg, Intravenous, Q4H PRN  promethazine, 25 mg, Intravenous, Q6H PRN    Network Utilization Review Department  ATTENTION: Please call with any questions or concerns to [phone] and carefully listen to the prompts so that you are directed to the right person   All voicemails are confidential   [facility] all requests for admission clinical reviews, approved or denied determinations and any other requests to dedicated fax number below belonging to the campus where the patient is receiving treatment   List of dedicated fax numbers for the Facilities:  [address] [place] [address] DENIALS (Administrative/Medical Necessity) [phone]   [address] (Maternity/NICU/Pediatrics) [phone]   [address] [place] [address]  [address] [facility] [id number] [place] [address] [address] [facility] [phone]

## 2022-06-07 NOTE — H&P
Bambi 46  H&P- Serina Caraballo 1970, 47 y o  male MRN: [id number]  Unit/Bed#: [address] Encounter: [id number]  Primary Care Provider: Lan Edwards MD   Date and time admitted to hospital: 6/6/2022  8:24 PM    * SBO  Assessment & Plan  Patient presented to [facility] ED on 6/4 for abdominal pain nausea vomiting for 1 day  CT showed small bowel obstruction at the proximal jejunum with transition point at the left upper abdomen  History of cholecystectomy, hernia repair, gastric bypass  Had SBO 1 year ago, required surgery per patient  Patient transferred to [place] due to needing ICU support following any potential surgery due to multiple comorbidities  · Continue NG tube to suction  · NPO  · GI prophylaxis  · IV hydration  · Pain control  · Consult surgery    A-fib [facility])  Assessment & Plan  Patient is on metoprolol 100 mg p o  B i d , Pradaxa 150 b i d  in nursing home  · EKG AFib with RVR, rate 104 on 6/4  · Start IV metoprolol ATC  Hold oral metoprolol due to # 1  · Continue heparin drip ACS protocol from [facility] while holding Pradaxa  · Optimize electrolytes  · Telemetry      Depression  Assessment & Plan  Continue Wellbutrin    Diabetes mellitus [facility])  Assessment & Plan  Lab Results   Component Value Date    HGBA1C 5 4 03/21/2022       History of diabetes  A1c 5 4 about 3 months ago  Not on diabetic medications in nursing home  Repeat A1c        Hypertension  Assessment & Plan  On metoprolol in nursing home  · BP stable  · Monitor BP closely    Chronic respiratory failure with hypoxia (HCC)  Assessment & Plan  On oxygen 2 L chronically  Likely due to obesity hypoventilation syndrome, COPD  Normal PA pressure on echo in 5/2021  On DuoNeb t i d , Symbicort in nursing home  · Continue oxygen 2 L during the day  · CPAP at HS on room air  · Continue Symbicort  · Change DuoNeb to Xopenex t i d  And Xopenex p r n      History of DVT (deep vein thrombosis)  Assessment & Plan  On Pradaxa 150 b i d  In nursing  · Currently on heparin drip ACS protocol  · Switch to Pradaxa once clears  by surgery    Hypothyroidism  Assessment & Plan  Continue Synthroid  · Recent TSH normal    COPD (chronic obstructive pulmonary disease) (HCC)  Assessment & Plan  No wheezing on auscultation  · Continue Symbicort  · Xopenex t i d  And p r n  Chronic diastolic heart failure [facility])  Assessment & Plan  Wt Readings from Last 3 Encounters:   06/06/22 (!) 226 kg (498 lb 3 8 oz)   06/02/22 (!) 232 kg (512 lb)   05/25/22 (!) 233 kg (512 lb 12 6 oz)     Patient is Demadex 40 p o  B i d , spirolactone 50 p o  Daily from nursing home  2D echo May 2021 showed normal EF  · Hold Demadex and spironolactone due to NPO  · IV Lasix 20 daily   · Continue IV hydration  · Daily weight and I&Os        Obesity hypoventilation syndrome/FILIPPO  Assessment & Plan  Continue oxygen 2 L during the day and CPAP at bedtime with room air    Morbid obesity [facility])  Assessment & Plan  Status post gastric bypass  · Patient is wheelchair-bound in nursing home        VTE Prophylaxis: Heparin Drip  / reason for no mechanical VTE prophylaxis On heparin drip   Code Status:  Full code  POLST: POLST form is not discussed and not completed at this time  Anticipated Length of Stay:  Patient will be admitted on an Inpatient basis with an anticipated length of stay of  > 2 midnights  Justification for Hospital Stay:  Small-bowel obstruction    Total Time for Visit, including Counseling / Coordination of Care: 45 minutes  Greater than 50% of this total time spent on direct patient counseling and coordination of care      Chief Complaint:   SBO    History of Present Illness:    Carbon Marco A is a 47 y o  male with PMH of bowel obstruction, CHF, AFib, DVT, chronic respiratory failure, COPD, obesity hypoventilation syndrome, chronic respiratory failure, hypothyroid, hypertension, morbid obesity, gastric bypass, cholecystectomy, hernia repair who presents with SBO  [facility] transferred from [facility]  Patient presented to [facility] ED on 6/4 for abdominal pain nausea vomiting, CT showed small bowel obstruction  Patient has been on NG tube to suction in [facility]  Patient transferred to [facility] due to potential need for surgery and would requir ICU stay postop due to multiple comorbidities  Patient reports mid upper abdominal pain currently, constant pain, sharp  Denies nausea vomiting  Denies chest pain, headache, dizziness, SOB, fever, chills  Review of Systems:    Review of Systems   Gastrointestinal: Positive for abdominal pain  All other systems reviewed and are negative  Past Medical and Surgical History:     Past Medical History:   Diagnosis Date    [facility] [address] [facility]     Anemia     Anxiety     [facility]  )     Cardiac disease     Cellulitis     COPD [facility]     Depression     Diabetes mellitus [facility]  )     [facility] thrombosis) [facility]     GERD (gastroesophageal reflux disease)     HBP (high blood pressure)     Heart failure ([facility]  )     Hx of blood clots     Hypertension     Hypokalemia     Hypothyroid     Obesity     Psychiatric disorder        Past Surgical History:   Procedure Laterality Date    ABDOMINAL HERNIA REPAIR  05/2019    CHOLECYSTECTOMY      Lap    GASTRECTOMY SLEEVE LAPAROSCOPIC  08/2018    INCISION AND DRAINAGE OF WOUND Bilateral 12/1/2021    Procedure: INCISION AND DRAINAGE (I&D) HEAD/FACE;  Surgeon: Kathy Cee MD;  Location:  MAIN OR;  Service: General    KNEE SURGERY Right     open wound, injury     LEG SURGERY Right 2009    US GUIDED VASCULAR ACCESS  8/6/2018       Meds/Allergies:    Prior to Admission medications    Medication Sig Start Date End Date Taking?  Authorizing Provider   ammonium lactate (LAC-HYDRIN) 12 % cream Apply topically 2 (two) times a day 11/15/21  Yes Anton Franco MD   budesonide-formoterol [facility]) 160-4 5 mcg/act inhaler Inhale 2 puffs 2 (two) times a day Rinse mouth after use     Yes Historical Provider, MD   dicyclomine (BENTYL) 20 mg tablet Take 1 tablet (20 mg total) by mouth 4 (four) times a day (before meals and at bedtime) 3/18/22  Yes Natividad Rodriguez MD   levothyroxine 25 mcg tablet Take 25 mcg by mouth daily   Yes Historical Provider, MD   metoprolol tartrate (LOPRESSOR) 50 mg tablet Take 2 tablets (100 mg total) by mouth every 12 (twelve) hours 4/10/22  Yes Lei Calero PA-C   aluminum-magnesium hydroxide-simethicone (MYLANTA) 200-200-20 mg/5 mL suspension Take 30 mL by mouth every 6 (six) hours as needed for indigestion or heartburn 3/18/22   Natividad Rodriguez MD   buPROPion (FORFIVO XL) 450 MG 24 hr tablet Take 1 tablet (450 mg total) by mouth daily 11/16/21   Kacie Handy MD   Cholecalciferol (VITAMIN D) 50 MCG (2000 UT) tablet Take 2,000 Units by mouth daily    Historical Provider, MD   cyanocobalamin (VITAMIN B-12) 1000 MCG tablet Take 1,000 mcg by mouth daily    Historical Provider, MD   dabigatran etexilate (PRADAXA) 150 mg capsu Take 150 mg by mouth 2 (two) times a day    Historical Provider, MD   docusate sodium (COLACE) 100 mg capsule Take 1 capsule (100 mg total) by mouth 2 (two) times a day 3/18/22   Natividad Rodriguez MD   famotidine (PEPCID) 20 mg tablet Take 1 tablet (20 mg total) by mouth daily 3/19/22   Natividad Rodriguez MD   ipratropium-albuterol (DUO-NEB) 0 5-2 5 mg/3 mL nebulizer solution Take 3 mL by nebulization 3 (three) times a day    Historical Provider, MD   loratadine (CLARITIN) 10 mg tablet Take 10 mg by mouth daily    Historical Provider, MD   nystatin (MYCOSTATIN) cream Apply topically 2 (two) times a day  Patient not taking: Reported on 6/6/2022 11/15/21   Kacie Handy MD   omeprazole (PriLOSEC) 20 mg delayed release capsule Take 40 mg by mouth daily 5/22/22   Historical Provider, MD   ondansetron (ZOFRAN) 4 mg tablet Take 1 tablet (4 mg total) by mouth every 8 (eight) hours as needed for nausea or vomiting  Patient not taking: Reported on 6/6/2022 3/18/22   Shar Gan MD   polyethylene glycol (MIRALAX) 17 g packet Take 17 g by mouth daily 3/19/22   Shar Gan MD   Potassium Chloride (KCL-20 PO) Take 20 mg by mouth in the morning    Historical Provider, MD   sodium chloride (OCEAN) 0 65 % nasal spray 1 spray into each nostril as needed for congestion 11/15/21   Abena Bassett MD   spironolactone (ALDACTONE) 50 mg tablet Take 50 mg by mouth daily    Historical Provider, MD   torsemide 40 MG TABS Take 40 mg by mouth 2 (two) times a day 3/18/22   Shar Gan MD   acetaminophen (TYLENOL) 500 mg tablet Take 1,000 mg by mouth every 6 (six) hours as needed for mild pain  Patient not taking: Reported on 6/6/2022 6/6/22  Kavya Cabral MD   albuterol (2 5 mg/3 mL) 0 083 % nebulizer solution Take 2 5 mg by nebulization every 6 (six) hours as needed for wheezing or shortness of breath  Patient not taking: Reported on 6/6/2022 6/6/22  Historical Provider, MD     I have reveiwed home medications using records provided by [facility]  Allergies:    Allergies   Allergen Reactions    Penicillins Hives       Social History:     Marital Status: Single   Occupation:  Disabled  Patient Pre-hospital Living Situation:  Nursing home  Patient Pre-hospital Level of Mobility:  Wheelchair-bound  Patient Pre-hospital Diet Restrictions:  Cardiac diet  Substance Use History:   Social History     Substance and Sexual Activity   Alcohol Use Not Currently     Social History     Tobacco Use   Smoking Status Former Smoker    Packs/day: 1 00    Years: 15 00    Pack years: 15 00   Smokeless Tobacco Never Used   Tobacco Comment    1 5ppd x 23 years, quit 2014     Social History     Substance and Sexual Activity   Drug Use No       Family History:    non-contributory    Physical Exam:     Vitals:   Blood Pressure: 128/83 (06/06/22 2257)  Pulse: 63 (06/06/22 2257)  Temperature: 98 8 °F (37 1 °C) (06/06/22 2257)  SpO2: 92 % (06/06/22 2257)    Physical Exam  Vitals and nursing note reviewed  Constitutional:       Appearance: He is well-developed  He is obese  HENT:      Head: Normocephalic and atraumatic  Neck:      Thyroid: No thyromegaly  Vascular: No JVD  Trachea: No tracheal deviation  Cardiovascular:      Rate and Rhythm: Normal rate  Rhythm irregular  Heart sounds: Normal heart sounds  Comments: Pulse rate  on bedside monitor during exam  Pulmonary:      Effort: Pulmonary effort is normal  No respiratory distress  Breath sounds: No wheezing or rales  Comments: Breath sounds clear diminished bilateral, on oxygen 2 L, satting well  Abdominal:      General: Bowel sounds are normal  There is no distension  Palpations: Abdomen is soft  Tenderness: There is abdominal tenderness  There is no guarding  Comments: Mid upper abdomen tender  Midline old surgical scar seen  NG tube to wall suction, draining brownish fluids   Musculoskeletal:         General: No swelling  Cervical back: Neck supple  Right lower leg: No edema  Left lower leg: No edema  Comments: Dry skin to buttocks   Skin:     General: Skin is warm and dry  Neurological:      General: No focal deficit present  Mental Status: He is alert and oriented to person, place, and time  Psychiatric:         Mood and Affect: Mood normal          Judgment: Judgment normal          Additional Data:     Lab Results: I have personally reviewed pertinent reports        Results from last 7 days   Lab Units 06/06/22  0520   WBC Thousand/uL 6 21   HEMOGLOBIN g/dL 12 6   HEMATOCRIT % 40 2   PLATELETS Thousands/uL 212   NEUTROS PCT % 70   LYMPHS PCT % 17   MONOS PCT % 9   EOS PCT % 2     Results from last 7 days   Lab Units 06/06/22  0520 06/05/22  0558   POTASSIUM mmol/L 3 9 4 0   CHLORIDE mmol/L 93* 94*   CO2 mmol/L 38* 39*   BUN mg/dL 15 15 CREATININE mg/dL 0 83 0 85   CALCIUM mg/dL 9 9 9 8   ALK PHOS U/L  --  74   ALT U/L  --  18   AST U/L  --  17     Results from last 7 days   Lab Units 06/04/22  0654   INR  1 13       Imaging: I have personally reviewed pertinent reports  CT abdomen pelvis wo contrast    Result Date: 6/4/2022  Narrative: CT ABDOMEN AND PELVIS WITHOUT IV CONTRAST INDICATION:   Bowel obstruction suspected Abdominal pain, acute, nonlocalized diffuse abdominal pain and vomiting, Hx of obstruction    COMPARISON:  None  TECHNIQUE:  CT examination of the abdomen and pelvis was performed without intravenous contrast  This examination was performed without intravenous contrast in the context of the critical nationwide Omnipaque shortage  Axial, sagittal, and coronal 2D reformatted images were created from the source data and submitted for interpretation  Radiation dose length product (DLP) for this visit:  2495 6 mGy-cm   This examination, like all CT scans performed in the [facility], was performed utilizing techniques to minimize radiation dose exposure, including the use of iterative  reconstruction and automated exposure control  Enteric contrast was administered  FINDINGS: ABDOMEN LOWER CHEST:  No clinically significant abnormality identified in the visualized lower chest  LIVER/BILIARY TREE:  Unremarkable  GALLBLADDER:  No calcified gallstones  No pericholecystic inflammatory change  SPLEEN:  Unremarkable  PANCREAS:  Unremarkable  ADRENAL GLANDS:  Unremarkable  KIDNEYS/URETERS:  Unremarkable  No hydronephrosis  STOMACH AND BOWEL:  Markedly distended duodenum and proximal jejunal up to 6 5 cm with abrupt transition in the left abdomen (series 201, image 52)  APPENDIX:  No findings to suggest appendicitis  ABDOMINOPELVIC CAVITY:  No ascites  No pneumoperitoneum  No lymphadenopathy  VESSELS:  Unremarkable for patient's age  PELVIS REPRODUCTIVE ORGANS:  Unremarkable for patient's age   URINARY BLADDER: Unremarkable  ABDOMINAL WALL/INGUINAL REGIONS:  Large ventral abdominal wall hernia containing numerous loops of small and large bowel  OSSEOUS STRUCTURES:  No acute fracture or destructive osseous lesion  Impression: Findings consistent with small bowel obstruction at the proximal jejunum with transition point at the left upper abdomen  Findings discussed with Dr Pierre Romero at [address] AM, 6/4/2022 Workstation performed: [id number]     XR chest 1 view portable    Result Date: 5/25/2022  Narrative: CHEST INDICATION:   chest pain  COMPARISON:  Multiple prior imaging studies, most recent chest x-rays dated April 3, 2022  EXAM PERFORMED/VIEWS:  XR CHEST PORTABLE FINDINGS: Cardiomegaly No definite consolidation or vascular congestion although interpretation is compromised due to body habitus  No discernible pleural effusion  Osseous structures appear within normal limits for patient age  Impression: 1  No acute cardiopulmonary disease  Workstation performed: [id number]     XR abdomen obstruction series    Result Date: 6/6/2022  Narrative: OBSTRUCTION SERIES INDICATION:   Small bowel obstruction  Abdominal pain  COMPARISON:  June 5, 2022 EXAM PERFORMED/VIEWS:  XR ABDOMEN OBSTRUCTION SERIES FINDINGS: Enteric tube present, tip overlying left upper quadrant of the abdomen  Examination is limited secondary to patient body habitus and underpenetration  Gaseous distention of a portion of the stomach is noted  Cholecystectomy clips noted  IVC filter noted  No evidence for acute osseous abnormality  Impression: Enteric tube in expected location, tip beneath left hemidiaphragm  Workstation performed: [id number]     XR abdomen obstruction series    Result Date: 6/5/2022  Narrative: OBSTRUCTION SERIES INDICATION:   eval sbo progress  COMPARISON:  Abdomen and pelvic CT from 6/4/2022  EXAM PERFORMED/VIEWS:  XR ABDOMEN OBSTRUCTION SERIES FINDINGS: Nasogastric tube in the stomach   Unchanged gastric and small bowel distention consistent with small bowel obstruction  No free air beneath the hemidiaphragms  No pathologic calcifications or soft tissue masses evident  IVC filter noted  Osseous structures are unremarkable  Examination of the chest reveals a normal cardiomediastinal silhouette  Lungs are clear  Impression: Unchanged gastric and small bowel distention consistent with small bowel obstruction  Workstation performed: [id number]       EKG, Pathology, and Other Studies Reviewed on Admission:   · EKG:  AFib with RVR,rate 104    Allscripts Records Reviewed: Yes     ** Please Note: Dragon 360 Dictation voice to text software may have been used in the creation of this document   **

## 2022-06-07 NOTE — QUICK NOTE
On group rounds determined that we should do a repeat p o  CT of abdomen and pelvis and continue to evaluate him tomorrow to see if patient is clearing obstruction based on patient passing small amount of gas

## 2022-06-07 NOTE — PLAN OF CARE
Problem: RESPIRATORY - ADULT  Goal: Achieves optimal ventilation and oxygenation  Description: INTERVENTIONS:  - Assess for changes in respiratory status  - Assess for changes in mentation and behavior  - Position to facilitate oxygenation and minimize respiratory effort  - Oxygen administered by appropriate delivery if ordered  - Initiate smoking cessation education as indicated  - Encourage broncho-pulmonary hygiene including cough, deep breathe, Incentive Spirometry  - Assess the need for suctioning and aspirate as needed  - Assess and instruct to report SOB or any respiratory difficulty  - Respiratory Therapy support as indicated  Outcome: Progressing     Problem: MOBILITY - ADULT  Goal: Maintain or return to baseline ADL function  Description: INTERVENTIONS:  -  Assess patient's ability to carry out ADLs; assess patient's baseline for ADL function and identify physical deficits which impact ability to perform ADLs (bathing, care of mouth/teeth, toileting, grooming, dressing, etc )  - Assess/evaluate cause of self-care deficits   - Assess range of motion  - Assess patient's mobility; develop plan if impaired  - Assess patient's need for assistive devices and provide as appropriate  - Encourage maximum independence but intervene and supervise when necessary  - Involve family in performance of ADLs  - Assess for home care needs following discharge   - Consider OT consult to assist with ADL evaluation and planning for discharge  - Provide patient education as appropriate  Outcome: Progressing  Goal: Maintains/Returns to pre admission functional level  Description: INTERVENTIONS:  - Perform BMAT or MOVE assessment daily    - Set and communicate daily mobility goal to care team and patient/family/caregiver  - Collaborate with rehabilitation services on mobility goals if consulted  - Perform Range of Motion   times a day  - Reposition patient every   hours    - Dangle patient   times a day  - Stand patient   times a day  - Ambulate patient   times a day  - Out of bed to chair  times a day   - Out of bed for meals   times a day  - Out of bed for toileting  - Record patient progress and toleration of activity level   Outcome: Progressing     Problem: Potential for Falls  Goal: Patient will remain free of falls  Description: INTERVENTIONS:  - Educate patient/family on patient safety including physical limitations  - Instruct patient to call for assistance with activity   - Consult OT/PT to assist with strengthening/mobility   - Keep Call bell within reach  - Keep bed low and locked with side rails adjusted as appropriate  - Keep care items and personal belongings within reach  - Initiate and maintain comfort rounds  - Make Fall Risk Sign visible to staff  - Offer Toileting every   Hours, in advance of need  - Initiate/Maintain  alarm  - Obtain necessary fall risk management equipment:    - Apply yellow socks and bracelet for high fall risk patients  - Consider moving patient to room near nurses station  Outcome: Progressing     Problem: Prexisting or High Potential for Compromised Skin Integrity  Goal: Skin integrity is maintained or improved  Description: INTERVENTIONS:  - Identify patients at risk for skin breakdown  - Assess and monitor skin integrity  - Assess and monitor nutrition and hydration status  - Monitor labs   - Assess for incontinence   - Turn and reposition patient  - Assist with mobility/ambulation  - Relieve pressure over bony prominences  - Avoid friction and shearing  - Provide appropriate hygiene as needed including keeping skin clean and dry  - Evaluate need for skin moisturizer/barrier cream  - Collaborate with interdisciplinary team   - Patient/family teaching  - Consider wound care consult   Outcome: Progressing

## 2022-06-07 NOTE — PROGRESS NOTES
Bambi 46  Progress Note - [place] A 1970, 47 y o  male MRN: [id number]  Unit/Bed#: [address] Encounter: [id number]  Primary Care Provider: Jaimee Venegas MD   Date and time admitted to hospital: 6/6/2022  8:24 PM    * SBO  Assessment & Plan  Patient presented to [place] ED on 6/4 for abdominal pain nausea vomiting for 1 day  CT showed small bowel obstruction at the proximal jejunum with transition point at the left upper abdomen  History of cholecystectomy, hernia repair, gastric bypass  Had SBO 1 year ago, required surgery per patient  Patient transferred to [place] due to needing ICU support following any potential surgery due to multiple comorbidities  · Continue NG tube to suction  · NPO  · Surgery consulted and appreciate their input  · Recommend repeat CT scan with oral contrast (via NG tube) to determine if obstruction is clearing  Diabetes mellitus [facility])  Assessment & Plan  Lab Results   Component Value Date    HGBA1C 5 6 06/07/2022     · History of diabetes  · Not on diabetic medications in nursing home    Hypertension  Assessment & Plan  On metoprolol in nursing home  · BP stable  · Monitor BP closely    Chronic respiratory failure with hypoxia (HCC)  Assessment & Plan  On oxygen 2 L chronically  Likely due to obesity hypoventilation syndrome, COPD  Normal PA pressure on echo in 5/2021  On DuoNeb t i d , Symbicort in nursing home  · Continue oxygen 2 L during the day  · CPAP at HS on room air  · Continue Symbicort  · Change DuoNeb to Xopenex t i d  And Xopenex p r n  History of DVT (deep vein thrombosis)  Assessment & Plan  On Pradaxa 150 b i d  In nursing  · Currently on heparin drip ACS protocol    · Switch to Pradaxa once cleared by surgery    Hypothyroidism  Assessment & Plan  Continue Synthroid  · Recent TSH normal    COPD (chronic obstructive pulmonary disease) (HCC)  Assessment & Plan  No wheezing on auscultation  · Continue Symbicort  · Xopenex t i d  And p r n  Chronic diastolic heart failure [facility])  Assessment & Plan  Wt Readings from Last 3 Encounters:   06/07/22 (!) 231 kg (509 lb 14 8 oz)   06/06/22 (!) 226 kg (498 lb 3 8 oz)   06/02/22 (!) 232 kg (512 lb)     Patient is Demadex 40 p o  B i d , spirolactone 50 p o  Daily from nursing home  2D echo May 2021 showed normal EF  · Hold Demadex and spironolactone due to NPO  · IV Lasix 20 daily   · Continue IV hydration  · Daily weight and I&Os        Obesity hypoventilation syndrome/FILIPPO  Assessment & Plan  · Continue oxygen 2 L during the day and CPAP at bedtime with room air    Morbid obesity [facility])  Assessment & Plan  Status post gastric bypass  · Patient is wheelchair-bound in nursing home    A-fib [facility])  Assessment & Plan  Patient is on metoprolol 100 mg p o  B i d , Pradaxa 150 b i d  in nursing home  · EKG AFib with RVR, rate 104 on 6/4  · Started IV metoprolol ATC given NPO status  Increase dose to 5 mg given persistent tachycardia  · Continue heparin drip ACS protocol from SLE while holding Pradaxa      VTE Pharmacologic Prophylaxis: VTE Score: 10 High Risk (Score >/= 5) - Pharmacological DVT Prophylaxis Ordered: heparin drip  Sequential Compression Devices Ordered  Patient Centered Rounds: I performed bedside rounds with nursing staff today  Discussions with Specialists or Other Care Team Provider: None    Education and Discussions with Family / Patient: Updated  (significant other) at bedside  Time Spent for Care: 30 minutes  More than 50% of total time spent on counseling and coordination of care as described above  Current Length of Stay: 1 day(s)  Current Patient Status: Inpatient   Certification Statement: The patient will continue to require additional inpatient hospital stay due to need for NG tube and repeat CT scan  Discharge Plan: Anticipate discharge in 48-72 hrs to [address]      Code Status: Level 1 - Full Code    Subjective:   Patient states that he has passed some gas  He has some intermittent nausea  Otherwise, no new complaints  Objective:     Vitals:   Temp (24hrs), Av 7 °F (37 1 °C), Min:98 4 °F (36 9 °C), Max:98 8 °F (37 1 °C)    Temp:  [98 4 °F (36 9 °C)-98 8 °F (37 1 °C)] 98 5 °F (36 9 °C)  HR:  [] 117  Resp:  [17-20] 17  BP: (128-134)/(81-84) 131/81  SpO2:  [89 %-95 %] 95 %  Body mass index is 71 12 kg/m²  Input and Output Summary (last 24 hours): Intake/Output Summary (Last 24 hours) at 2022 1340  Last data filed at 2022 1129  Gross per 24 hour   Intake --   Output 2300 ml   Net -2300 ml       Physical Exam:   Physical Exam  Vitals and nursing note reviewed  Constitutional:       Appearance: He is well-developed  He is morbidly obese  HENT:      Head: Normocephalic and atraumatic  Comments: NG tube in place  Eyes:      Conjunctiva/sclera: Conjunctivae normal    Cardiovascular:      Rate and Rhythm: Normal rate and regular rhythm  Heart sounds: No murmur heard  Pulmonary:      Effort: Pulmonary effort is normal  No respiratory distress  Breath sounds: Normal breath sounds  Abdominal:      General: Bowel sounds are absent  Palpations: Abdomen is soft  Tenderness: There is no abdominal tenderness  Musculoskeletal:      Cervical back: Neck supple  Skin:     General: Skin is warm and dry  Neurological:      Mental Status: He is alert          Additional Data:     Labs:  Results from last 7 days   Lab Units 22  0520   WBC Thousand/uL 6 21   HEMOGLOBIN g/dL 12 6   HEMATOCRIT % 40 2   PLATELETS Thousands/uL 212   NEUTROS PCT % 70   LYMPHS PCT % 17   MONOS PCT % 9   EOS PCT % 2     Results from last 7 days   Lab Units 22  0620 22  0520 22  0558   SODIUM mmol/L 144   < > 140   POTASSIUM mmol/L 3 8   < > 4 0   CHLORIDE mmol/L 99*   < > 94*   CO2 mmol/L 41*   < > 39*   BUN mg/dL 13   < > 15   CREATININE mg/dL 0 78   < > 0 85 ANION GAP mmol/L 4   < > 7   CALCIUM mg/dL 9 3   < > 9 8   ALBUMIN g/dL  --   --  3 7   TOTAL BILIRUBIN mg/dL  --   --  0 54   ALK PHOS U/L  --   --  74   ALT U/L  --   --  18   AST U/L  --   --  17   GLUCOSE RANDOM mg/dL 122   < > 129    < > = values in this interval not displayed  Results from last 7 days   Lab Units 06/04/22  0654   INR  1 13         Results from last 7 days   Lab Units 06/07/22  0620   HEMOGLOBIN A1C % 5 6     Results from last 7 days   Lab Units 06/04/22  0056   LACTIC ACID mmol/L 1 3       Lines/Drains:  Invasive Devices  Report    Peripheral Intravenous Line  Duration           Long-Dwell Peripheral IV (Midline) 06/06/22 Right Brachial <1 day    Peripheral IV 06/06/22 Distal;Right;Ventral (anterior) Forearm <1 day    Peripheral IV 06/06/22 Left Antecubital <1 day          Drain  Duration           NG/OG/Enteral Tube Nasogastric 16 Fr Right nare 3 days              Telemetry:  Telemetry Orders (From admission, onward)             48 Hour Telemetry Monitoring  Continuous x 48 hours        References:    Telemetry Guidelines   Question:  Reason for 48 Hour Telemetry  Answer:  Arrhythmias Requiring Medical Therapy (eg  SVT, Vtach/fib, Bradycardia, Uncontrolled A-fib)                 Telemetry Reviewed: Atrial fibrillation  HR averaging 110  Indication for Continued Telemetry Use: No indication for continued use  Will discontinue                Imaging: Reviewed radiology reports from this admission including: abdominal/pelvic CT    Recent Cultures (last 7 days):         Last 24 Hours Medication List:   Current Facility-Administered Medications   Medication Dose Route Frequency Provider Last Rate    acetaminophen  325 mg Rectal Q4H PRN RADHA Hernandez      ammonium lactate   Topical BID RADHA Hernandez      barium  900 mL Oral Once in imaging Maryse Schreiber PA-C      budesonide-formoterol  2 puff Inhalation BID RADHA Hernandez      buPROPion  450 mg Oral Daily Doreen Spencer CRNP      dextrose 5 % and sodium chloride 0 45 % with KCl 20 mEq/L  100 mL/hr Intravenous Continuous Cuiyin Yurik, CRNP 100 mL/hr (06/07/22 0957)    famotidine  20 mg Intravenous Q12H [address] Cuiyin Yurik, CRNP      fluticasone  1 spray Each Nare Daily Cuiyin Yurik, CRNP      furosemide  20 mg Intravenous Daily Cuiyin Yurik, CRNP      heparin (porcine)  3-25 Units/kg/hr (Order-Specific) Intravenous Titrated Cuiyin Yurik, CRNP 27 1 Units/kg/hr (06/07/22 0337)    heparin (porcine)  2,000 Units Intravenous Q1H PRN Cuiyin Yurik, CRNP      heparin (porcine)  4,000 Units Intravenous Q1H PRN Cuiyin Yurik, CRNP      levalbuterol  0 63 mg Nebulization Q4H PRN Cuiyin Yurik, CRNP      levalbuterol  1 25 mg Nebulization TID Cuiyin Yurik, CRNP      And    sodium chloride  3 mL Nebulization TID Cuiyin Yurik, CRNP      metoprolol  2 5 mg Intravenous Q6H Cuiyin Yurik, CRNP      morphine injection  4 mg Intravenous Q4H PRN Cuiyin Yurik, CRNP      morphine injection  2 mg Intravenous Q4H PRN Cuiyin Yurik, CRNP      nystatin   Topical BID Cuiyin Yurik, CRNP      ondansetron  4 mg Intravenous Q4H PRN Cuiyin Yurik, CRNP      promethazine  25 mg Intravenous Q6H PRN Dinora Rodríguez, RADHA          Today, Patient Was Seen By: Wyatt Hill PA-C    **Please Note: This note may have been constructed using a voice recognition system  **

## 2022-06-07 NOTE — ASSESSMENT & PLAN NOTE
Wt Readings from Last 3 Encounters:   06/06/22 (!) 226 kg (498 lb 3 8 oz)   06/02/22 (!) 232 kg (512 lb)   05/25/22 (!) 233 kg (512 lb 12 6 oz)     Patient is Demadex 40 p o  B i d , spirolactone 50 p o  Daily from nursing home  2D echo May 2021 showed normal EF  · Hold Demadex and spironolactone due to NPO    · IV Lasix 20 daily   · Continue IV hydration  · Daily weight and I&Os

## 2022-06-07 NOTE — ASSESSMENT & PLAN NOTE
Patient is on metoprolol 100 mg p o  B i d , Pradaxa 150 b i d  in nursing home  · EKG AFib with RVR, rate 104 on 6/4  · Start IV metoprolol ATC  Hold oral metoprolol due to # 1     · Continue heparin drip ACS protocol from SLE while holding Pradaxa  · Optimize electrolytes  · Telemetry

## 2022-06-07 NOTE — ASSESSMENT & PLAN NOTE
On Pradaxa 150 b i d  In nursing  · Currently on heparin drip ACS protocol    · Switch to Pradaxa once clears  by surgery

## 2022-06-07 NOTE — ASSESSMENT & PLAN NOTE
On Pradaxa 150 b i d  In nursing  · Currently on heparin drip ACS protocol    · Switch to Pradaxa once cleared by surgery

## 2022-06-07 NOTE — PLAN OF CARE
-NIPPV at night  Problem: RESPIRATORY - ADULT  Goal: Achieves optimal ventilation and oxygenation  Description: INTERVENTIONS:  - Assess for changes in respiratory status  - Assess for changes in mentation and behavior  - Position to facilitate oxygenation and minimize respiratory effort  - Oxygen administered by appropriate delivery if ordered  - Initiate smoking cessation education as indicated  - Encourage broncho-pulmonary hygiene including cough, deep breathe, Incentive Spirometry  - Assess the need for suctioning and aspirate as needed  - Assess and instruct to report SOB or any respiratory difficulty  - Respiratory Therapy support as indicated  Outcome: Progressing

## 2022-06-07 NOTE — UTILIZATION REVIEW
Notification of Discharge   This is a Notification of Discharge from our facility [address]  Please be advised that this patient has been discharge from our facility  Below you will find the admission and discharge date and time including the patients disposition  UTILIZATION REVIEW CONTACT:  Zeb Shepherd  Utilization   Network Utilization Review Department  Phone: [phone] x carefully listen to the prompts  All voicemails are confidential   Email: [email or web address]     PHYSICIAN ADVISORY SERVICES:  FOR RRIS-PN-PIGE REVIEW - MEDICAL NECESSITY DENIAL  Phone: [phone]  Fax: [phone]  Email: [email or web address]     PRESENTATION DATE: 6/4/2022 12:08 AM  OBERVATION ADMISSION DATE:   INPATIENT ADMISSION DATE: 6/4/22  4:34 AM   DISCHARGE DATE: 6/6/2022  7:52 PM  DISPOSITION: [address] [facility]      IMPORTANT INFORMATION:  Send all requests for admission clinical reviews, approved or denied determinations and any other requests to dedicated fax number below belonging to the campus where the patient is receiving treatment   List of dedicated fax numbers:  [address] [place] [address] DENIALS (Administrative/Medical Necessity) [phone]   [address] [place] [address] (Maternity/NICU/Pediatrics) [phone]   [facility] [phone]   [address] [facility] [phone]   [address] [place] [address] [phone]   [address] [place] [address] [address] [place] [address],[address] [phone]   [facility]  [phone]   [address] [facility]  [address] [facility] [address] [facility] [phone]

## 2022-06-07 NOTE — PROGRESS NOTES
Progress Note - General Surgery   Jeffrey Chapin 47 y o  male MRN: [id number]  Unit/Bed#: 2 [address] Encounter: [id number]    Assessment:  1) SBO - patient is still having significant NG tube output, passing only minimal gas yesterday, no significant bowel movements or routine passing gas, still is distended and showing signs of discomfort, pain with percussion, tympany with percussion  2) Reducible Ventral Hernia - soft, reducible, only minimal discomfort with palpation, no skin discoloration  3) Morbidly Obese - BMI of 71 12      Plan:  1-3)   - considerations for repeat CT scan with p o  contrast we will discuss with surgical attending  - continue NPO  - NG tube on low continuous suction  - significant time spent with NG tube maintenance and educating nursing staff  - continue therapeutic heparin based on blood clot history  - p r n  Analgesia  - p r n  Zofran  - discussion with patient regarding possibility of surgery  - IV fluids at 125 mL/hour lactated Ringer  - continue home medications  Medicine team  - breathing treatments per Medicine team based on desaturation of SpO2    Subjective/Objective   Chief Complaint:  I am still barely passing gas    Subjective:  Patient was seen and examined at bedside  Patient denies any acute events overnight  Patient denies any nausea or vomiting  Patient does feel better with NG tube in  Patient does have abdominal pain/discomfort  Patient reports the pain is more diffuse in nature but on exam patient reports is primarily in the upper quadrants  Patient understands that he is at increased risk for surgery being that he has not cleared his obstruction yet  Patient reports that he presented on Friday and was transferred yesterday  This is approximately day 4 of patient's presentation of obstructed like qualities  Patient is not having any tenderness or pain with palpation on hernia      Objective:     Blood pressure 134/83, pulse (!) 109, temperature 98 8 °F (37 1 °C), resp  rate 19, weight (!) 231 kg (509 lb 14 8 oz), SpO2 93 %  ,Body mass index is 71 12 kg/m²  Intake/Output Summary (Last 24 hours) at 6/7/2022 0936  Last data filed at 6/7/2022 0931  Gross per 24 hour   Intake --   Output 1400 ml   Net -1400 ml       Invasive Devices  Report    Peripheral Intravenous Line  Duration           Long-Dwell Peripheral IV (Midline) 06/06/22 Right Brachial <1 day    Peripheral IV 06/06/22 Distal;Right;Ventral (anterior) Forearm <1 day    Peripheral IV 06/06/22 Left Antecubital <1 day          Drain  Duration           NG/OG/Enteral Tube Nasogastric 16 Fr Right nare 3 days                Physical Exam: /83   Pulse (!) 109   Temp 98 8 °F (37 1 °C)   Resp 19   Wt (!) 231 kg (509 lb 14 8 oz)   SpO2 93%   BMI 71 12 kg/m²   General appearance: alert and Morbidly obese  Head: Normocephalic, without obvious abnormality, atraumatic  Lungs: clear to auscultation bilaterally  Heart: Tachycardia   Abdomen: Soft, tenderness in the upper quadrants, active bowel sounds, soft and reducible ventral hernia, tympanic to percussion, some tenderness with percussion  Skin: Skin color, texture, turgor normal  No rashes or lesions or Prior scars from surgery    Lab, Imaging and other studies:  I have personally reviewed pertinent lab results    , CBC: No results found for: WBC, HGB, HCT, MCV, PLT, ADJUSTEDWBC, MCH, MCHC, RDW, MPV, NRBC, CMP:   Lab Results   Component Value Date    SODIUM 144 06/07/2022    K 3 8 06/07/2022    CL 99 (L) 06/07/2022    CO2 41 (H) 06/07/2022    BUN 13 06/07/2022    CREATININE 0 78 06/07/2022    CALCIUM 9 3 06/07/2022    EGFR 104 06/07/2022     VTE Pharmacologic Prophylaxis: Heparin  VTE Mechanical Prophylaxis: sequential compression device

## 2022-06-07 NOTE — CASE MANAGEMENT
Case Management Assessment & Discharge Planning Note    Patient name Shadia Polanco  Location 2 [facility] 204/2 Hillary CORREA MRN [id number]  : 1970 Date 2022       Current Admission Date: 2022  Current Admission Diagnosis:SBO   Patient Active Problem List    Diagnosis Date Noted    Hypertension 2022    [facility] mellitus [facility] ) 2022    Depression 2022    Intractable abdominal pain 2022    Epidermoid cyst of neck 2022    Chronic respiratory failure with hypoxia ([facility] ) 2021    Cellulitis of multiple sites of head and neck 2021    Elevated troponin 2021    History of DVT (deep vein thrombosis) 2021    Positive blood culture 2021    QT prolongation 2021    COPD (chronic obstructive pulmonary disease) ([facility] ) 05/10/2021    Hypothyroidism 05/10/2021    Medical non-compliance 2017    Atrial fibrillation with RVR ([facility] ) 2017    Foot pain 11/15/2016    Knee pain 11/15/2016    SBO 2016    Recurrent bronchospasm 10/16/2016    Venous stasis dermatitis of both lower extremities 10/16/2016    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/2016    Obesity hypoventilation syndrome/FILIPPO 2016    Chronic diastolic heart failure (HCC) 2016    MRSA (methicillin resistant Staphylococcus aureus) Tracheobronchitis 2016    A-fib ([facility] ) 08/15/2016    Morbid obesity ([facility] ) 08/15/2016    Tremor 08/15/2016      LOS (days): 1  Geometric Mean LOS (GMLOS) (days):   Days to GMLOS:     OBJECTIVE:  PATIENT READMITTED TO HOSPITAL  Risk of Unplanned Readmission Score: 27 47     Current admission status: Inpatient    Preferred Pharmacy:   [facility][id number]- [address]  [address]   [address] [address], [address] [facility]  Phone: [phone] Fax: [phone]    Primary Care Provider: Fito Hernandez MD    Primary Insurance: [address] [facility]  Secondary Insurance: ASSESSMENT:  [address], [address] Other   Primary Phone: [phone] (Mobile)               Advance Directives  Does patient have a [address]?: No  Does patient currently have a [facility] decision maker?: Yes, please see Health Care Proxy section  Does patient have Advance Directives?: No  Primary Contact: Erica Mcguiredalirenay    Readmission Root Cause  30 Day Readmission: No    Patient Information  Admitted from[de-identified] Facility [facility])  Mental Status: Alert  During Assessment patient was accompanied by: Not accompanied during assessment  Assessment information provided by[de-identified] Patient  Primary Caregiver: Other (Comment)  Caregiver's Name[de-identified] Shanda Knight Relationship to Patient[de-identified] Other (Specify) (Skilled nursing facility)  Caregiver's Telephone Number[de-identified] [phone]  Support Systems: Spouse/significant other  [place] of Residence: [address] [place] [address] do you live in?: [place] entry access options  Select all that apply : No steps to enter home  Type of Current Residence: Facility ([address] [place] [address])  In the last 12 months, was there a time when you were not able to pay the mortgage or rent on time?: No  In the last 12 months, how many places have you lived?: 1 (per pt he has been in facility a few years now)  In the last 12 months, was there a time when you did not have a steady place to sleep or slept in a shelter (including now)?: No  Living Arrangements: Other (Comment) (Lives in skilled nursing facility)    Activities of Daily Living Prior to Admission  Functional Status: Total dependent  Completes ADLs independently?: No  Level of ADL dependence: Total Dependent  Ambulates independently?: No  Level of ambulatory dependence:  Total Dependent (bedbound, per pt was just starting PT again at facility prior to hospitalization)    Patient Information Continued  Income Source: SSI/SSD  Does patient have prescription coverage?: Yes  Within the past 12 months, you worried that your food would run out before you got the money to buy more : Never true  Within the past 12 months, the food you bought just didn't last and you didn't have money to get more : Never true  Food insecurity resource given?: N/A (in facility)  Does patient receive dialysis treatments?: No    Means of Transportation  Means of Transport to Appts[de-identified] Other (Comment) (medical transport)  In the past 12 months, has lack of transportation kept you from medical appointments or from getting medications?: No  In the past 12 months, has lack of transportation kept you from meetings, work, or from getting things needed for daily living?: No  Was application for public transport provided?: N/A (in facility)      DISCHARGE DETAILS:    Discharge planning discussed with[de-identified] Patient  Freedom of Choice: Yes  Comments - Freedom of Choice: SW following to assess needs and assist with planning  Pt is a long term resident at [facility]  [facility]  Pt is requesting return to [facility] when discharged  Per pt he was just restarting therapy at the facility before he was hospitalized  SW will follow to monitor progress and assist with transfer back when ready  CM contacted family/caregiver?: No- see comments (Per pt)  Were Treatment Team discharge recommendations reviewed with patient/caregiver?: Yes  Did patient/caregiver verbalize understanding of patient care needs?: N/A- going to facility  Were patient/caregiver advised of the risks associated with not following Treatment Team discharge recommendations?: Yes    Requested [address]         Is the patient interested in [facility] at discharge?: No    DME Referral Provided  Referral made for DME?: No    Other Referral/Resources/Interventions Provided:  Interventions: SNF  Referral Comments: Referral made to [facility] [place]    Per Karen Ghosh, admission liaison, pt is a Medicaid bed hold at facility  SW will continue to provide updated clinical to prepare for eventual return      Treatment Team Recommendation: SNF  Discharge Destination Plan[de-identified] SNF  Transport at Discharge : BLS Ambulance (Bariatric (500+ lbs))

## 2022-06-07 NOTE — ASSESSMENT & PLAN NOTE
Lab Results   Component Value Date    HGBA1C 5 6 06/07/2022     · History of diabetes      · Not on diabetic medications in nursing home

## 2022-06-07 NOTE — ASSESSMENT & PLAN NOTE
Patient is on metoprolol 100 mg p o  B i d , Pradaxa 150 b i d  in nursing home  · EKG AFib with RVR, rate 104 on 6/4  · Started IV metoprolol ATC given NPO status  Increase dose to 5 mg given persistent tachycardia    · Continue heparin drip ACS protocol from SLE while holding Pradaxa

## 2022-06-08 ENCOUNTER — APPOINTMENT (INPATIENT)
Dept: RADIOLOGY | Facility: HOSPITAL | Age: 52
DRG: 224 | End: 2022-06-08
Payer: COMMERCIAL

## 2022-06-08 LAB
2HR DELTA HS TROPONIN: 5 NG/L
4HR DELTA HS TROPONIN: 0 NG/L
ANION GAP SERPL CALCULATED.3IONS-SCNC: 7 MMOL/L (ref 4–13)
APTT PPP: 49 SECONDS (ref 23–37)
APTT PPP: 79 SECONDS (ref 23–37)
APTT PPP: 80 SECONDS (ref 23–37)
BUN SERPL-MCNC: 10 MG/DL (ref 5–25)
CALCIUM SERPL-MCNC: 9.3 MG/DL (ref 8.3–10.1)
CARDIAC TROPONIN I PNL SERPL HS: 3 NG/L
CARDIAC TROPONIN I PNL SERPL HS: 3 NG/L
CARDIAC TROPONIN I PNL SERPL HS: 8 NG/L
CHLORIDE SERPL-SCNC: 97 MMOL/L (ref 100–108)
CO2 SERPL-SCNC: 37 MMOL/L (ref 21–32)
CREAT SERPL-MCNC: 0.92 MG/DL (ref 0.6–1.3)
GFR SERPL CREATININE-BSD FRML MDRD: 95 ML/MIN/1.73SQ M
GLUCOSE SERPL-MCNC: 127 MG/DL (ref 65–140)
MRSA NOSE QL CULT: ABNORMAL
MRSA NOSE QL CULT: ABNORMAL
POTASSIUM SERPL-SCNC: 3.9 MMOL/L (ref 3.5–5.3)
SODIUM SERPL-SCNC: 141 MMOL/L (ref 136–145)

## 2022-06-08 PROCEDURE — 94640 AIRWAY INHALATION TREATMENT: CPT

## 2022-06-08 PROCEDURE — 99233 SBSQ HOSP IP/OBS HIGH 50: CPT | Performed by: FAMILY MEDICINE

## 2022-06-08 PROCEDURE — 99024 POSTOP FOLLOW-UP VISIT: CPT | Performed by: SPECIALIST

## 2022-06-08 PROCEDURE — 85730 THROMBOPLASTIN TIME PARTIAL: CPT | Performed by: FAMILY MEDICINE

## 2022-06-08 PROCEDURE — 84484 ASSAY OF TROPONIN QUANT: CPT | Performed by: FAMILY MEDICINE

## 2022-06-08 PROCEDURE — 80048 BASIC METABOLIC PNL TOTAL CA: CPT | Performed by: FAMILY MEDICINE

## 2022-06-08 PROCEDURE — 94760 N-INVAS EAR/PLS OXIMETRY 1: CPT

## 2022-06-08 PROCEDURE — 74022 RADEX COMPL AQT ABD SERIES: CPT

## 2022-06-08 RX ORDER — DILTIAZEM HYDROCHLORIDE 5 MG/ML
10 INJECTION INTRAVENOUS ONCE
Status: COMPLETED | OUTPATIENT
Start: 2022-06-08 | End: 2022-06-08

## 2022-06-08 RX ORDER — DILTIAZEM HYDROCHLORIDE 5 MG/ML
10 INJECTION INTRAVENOUS ONCE
Status: DISCONTINUED | OUTPATIENT
Start: 2022-06-08 | End: 2022-06-08

## 2022-06-08 RX ORDER — METOPROLOL TARTRATE 5 MG/5ML
5 INJECTION INTRAVENOUS ONCE
Status: COMPLETED | OUTPATIENT
Start: 2022-06-08 | End: 2022-06-08

## 2022-06-08 RX ADMIN — FAMOTIDINE 20 MG: 10 INJECTION, SOLUTION INTRAVENOUS at 21:57

## 2022-06-08 RX ADMIN — HEPARIN SODIUM 31.1 UNITS/KG/HR: 10000 INJECTION, SOLUTION INTRAVENOUS at 01:37

## 2022-06-08 RX ADMIN — DEXTROSE, SODIUM CHLORIDE, AND POTASSIUM CHLORIDE 125 ML/HR: 5; .45; .15 INJECTION INTRAVENOUS at 15:09

## 2022-06-08 RX ADMIN — DILTIAZEM HYDROCHLORIDE 10 MG: 5 INJECTION INTRAVENOUS at 10:34

## 2022-06-08 RX ADMIN — HEPARIN SODIUM 31.1 UNITS/KG/HR: 10000 INJECTION, SOLUTION INTRAVENOUS at 11:01

## 2022-06-08 RX ADMIN — LEVALBUTEROL HYDROCHLORIDE 1.25 MG: 1.25 SOLUTION, CONCENTRATE RESPIRATORY (INHALATION) at 07:16

## 2022-06-08 RX ADMIN — HEPARIN SODIUM 33.1 UNITS/KG/HR: 10000 INJECTION, SOLUTION INTRAVENOUS at 20:47

## 2022-06-08 RX ADMIN — DEXTROSE, SODIUM CHLORIDE, AND POTASSIUM CHLORIDE 125 ML/HR: 5; .45; .15 INJECTION INTRAVENOUS at 05:45

## 2022-06-08 RX ADMIN — METOPROLOL TARTRATE 5 MG: 1 INJECTION, SOLUTION INTRAVENOUS at 01:01

## 2022-06-08 RX ADMIN — ISODIUM CHLORIDE 3 ML: 0.03 SOLUTION RESPIRATORY (INHALATION) at 19:19

## 2022-06-08 RX ADMIN — FLUTICASONE PROPIONATE 1 SPRAY: 50 SPRAY, METERED NASAL at 08:03

## 2022-06-08 RX ADMIN — DEXTROSE, SODIUM CHLORIDE, AND POTASSIUM CHLORIDE 125 ML/HR: 5; .45; .15 INJECTION INTRAVENOUS at 23:35

## 2022-06-08 RX ADMIN — MUPIROCIN 1 APPLICATION: 20 OINTMENT TOPICAL at 22:00

## 2022-06-08 RX ADMIN — BUDESONIDE AND FORMOTEROL FUMARATE DIHYDRATE 2 PUFF: 160; 4.5 AEROSOL RESPIRATORY (INHALATION) at 08:00

## 2022-06-08 RX ADMIN — ISODIUM CHLORIDE 3 ML: 0.03 SOLUTION RESPIRATORY (INHALATION) at 13:58

## 2022-06-08 RX ADMIN — DILTIAZEM HYDROCHLORIDE 5 MG/HR: 5 INJECTION INTRAVENOUS at 10:59

## 2022-06-08 RX ADMIN — HEPARIN SODIUM 2000 UNITS: 1000 INJECTION INTRAVENOUS; SUBCUTANEOUS at 14:27

## 2022-06-08 RX ADMIN — MORPHINE SULFATE 2 MG: 2 INJECTION, SOLUTION INTRAMUSCULAR; INTRAVENOUS at 10:35

## 2022-06-08 RX ADMIN — IOHEXOL 50 ML: 240 INJECTION, SOLUTION INTRATHECAL; INTRAVASCULAR; INTRAVENOUS; ORAL at 14:52

## 2022-06-08 RX ADMIN — LEVALBUTEROL HYDROCHLORIDE 1.25 MG: 1.25 SOLUTION, CONCENTRATE RESPIRATORY (INHALATION) at 13:58

## 2022-06-08 RX ADMIN — FAMOTIDINE 20 MG: 10 INJECTION, SOLUTION INTRAVENOUS at 08:00

## 2022-06-08 RX ADMIN — Medication: at 17:01

## 2022-06-08 RX ADMIN — BUDESONIDE AND FORMOTEROL FUMARATE DIHYDRATE 2 PUFF: 160; 4.5 AEROSOL RESPIRATORY (INHALATION) at 21:58

## 2022-06-08 RX ADMIN — MORPHINE SULFATE 2 MG: 2 INJECTION, SOLUTION INTRAMUSCULAR; INTRAVENOUS at 15:11

## 2022-06-08 RX ADMIN — LEVALBUTEROL HYDROCHLORIDE 1.25 MG: 1.25 SOLUTION, CONCENTRATE RESPIRATORY (INHALATION) at 19:19

## 2022-06-08 RX ADMIN — BUPROPION 450 MG: 150 TABLET, EXTENDED RELEASE ORAL at 08:00

## 2022-06-08 RX ADMIN — ONDANSETRON 4 MG: 2 INJECTION INTRAMUSCULAR; INTRAVENOUS at 08:00

## 2022-06-08 RX ADMIN — Medication: at 08:04

## 2022-06-08 RX ADMIN — METOPROLOL TARTRATE 5 MG: 1 INJECTION, SOLUTION INTRAVENOUS at 03:56

## 2022-06-08 RX ADMIN — MORPHINE SULFATE 2 MG: 2 INJECTION, SOLUTION INTRAMUSCULAR; INTRAVENOUS at 19:22

## 2022-06-08 RX ADMIN — ISODIUM CHLORIDE 3 ML: 0.03 SOLUTION RESPIRATORY (INHALATION) at 07:16

## 2022-06-08 RX ADMIN — DILTIAZEM HYDROCHLORIDE 10 MG: 5 INJECTION INTRAVENOUS at 16:49

## 2022-06-08 RX ADMIN — MORPHINE SULFATE 2 MG: 2 INJECTION, SOLUTION INTRAMUSCULAR; INTRAVENOUS at 06:05

## 2022-06-08 NOTE — PLAN OF CARE
Problem: RESPIRATORY - ADULT  Goal: Achieves optimal ventilation and oxygenation  Description: INTERVENTIONS:  - Assess for changes in respiratory status  - Assess for changes in mentation and behavior  - Position to facilitate oxygenation and minimize respiratory effort  - Oxygen administered by appropriate delivery if ordered  - Initiate smoking cessation education as indicated  - Encourage broncho-pulmonary hygiene including cough, deep breathe, Incentive Spirometry  - Assess the need for suctioning and aspirate as needed  - Assess and instruct to report SOB or any respiratory difficulty  - Respiratory Therapy support as indicated  Outcome: Progressing     Problem: MOBILITY - ADULT  Goal: Maintain or return to baseline ADL function  Description: INTERVENTIONS:  -  Assess patient's ability to carry out ADLs; assess patient's baseline for ADL function and identify physical deficits which impact ability to perform ADLs (bathing, care of mouth/teeth, toileting, grooming, dressing, etc )  - Assess/evaluate cause of self-care deficits   - Assess range of motion  - Assess patient's mobility; develop plan if impaired  - Assess patient's need for assistive devices and provide as appropriate  - Encourage maximum independence but intervene and supervise when necessary  - Involve family in performance of ADLs  - Assess for home care needs following discharge   - Consider OT consult to assist with ADL evaluation and planning for discharge  - Provide patient education as appropriate  Outcome: Progressing  Goal: Maintains/Returns to pre admission functional level  Description: INTERVENTIONS:  - Perform BMAT or MOVE assessment daily    - Set and communicate daily mobility goal to care team and patient/family/caregiver  - Collaborate with rehabilitation services on mobility goals if consulted  - Perform Range of Motion  times a day  - Reposition patient every  hours    - Dangle patient  times a day  - Stand patient  times a day  - Ambulate patient  times a day  - Out of bed to chair  times a day   - Out of bed for meals  times a day  - Out of bed for toileting  - Record patient progress and toleration of activity level   Outcome: Progressing     Problem: Potential for Falls  Goal: Patient will remain free of falls  Description: INTERVENTIONS:  - Educate patient/family on patient safety including physical limitations  - Instruct patient to call for assistance with activity   - Consult OT/PT to assist with strengthening/mobility   - Keep Call bell within reach  - Keep bed low and locked with side rails adjusted as appropriate  - Keep care items and personal belongings within reach  - Initiate and maintain comfort rounds  - Make Fall Risk Sign visible to staff  - Offer Toileting every  Hours, in advance of need  - Initiate/Maintain alarm  - Obtain necessary fall risk management equipment:   - Apply yellow socks and bracelet for high fall risk patients  - Consider moving patient to room near nurses station  Outcome: Progressing     Problem: Prexisting or High Potential for Compromised Skin Integrity  Goal: Skin integrity is maintained or improved  Description: INTERVENTIONS:  - Identify patients at risk for skin breakdown  - Assess and monitor skin integrity  - Assess and monitor nutrition and hydration status  - Monitor labs   - Assess for incontinence   - Turn and reposition patient  - Assist with mobility/ambulation  - Relieve pressure over bony prominences  - Avoid friction and shearing  - Provide appropriate hygiene as needed including keeping skin clean and dry  - Evaluate need for skin moisturizer/barrier cream  - Collaborate with interdisciplinary team   - Patient/family teaching  - Consider wound care consult   Outcome: Progressing     Problem: CARDIOVASCULAR - ADULT  Goal: Maintains optimal cardiac output and hemodynamic stability  Description: INTERVENTIONS:  - Monitor I/O, vital signs and rhythm  - Monitor for S/S and trends of decreased cardiac output  - Administer and titrate ordered vasoactive medications to optimize hemodynamic stability  - Assess quality of pulses, skin color and temperature  - Assess for signs of decreased coronary artery perfusion  - Instruct patient to report change in severity of symptoms  Outcome: Progressing  Goal: Absence of cardiac dysrhythmias or at baseline rhythm  Description: INTERVENTIONS:  - Continuous cardiac monitoring, vital signs, obtain 12 lead EKG if ordered  - Administer antiarrhythmic and heart rate control medications as ordered  - Monitor electrolytes and administer replacement therapy as ordered  Outcome: Progressing     Problem: GASTROINTESTINAL - ADULT  Goal: Minimal or absence of nausea and/or vomiting  Description: INTERVENTIONS:  - Administer IV fluids if ordered to ensure adequate hydration  - Maintain NPO status until nausea and vomiting are resolved  - Nasogastric tube if ordered  - Administer ordered antiemetic medications as needed  - Provide nonpharmacologic comfort measures as appropriate  - Advance diet as tolerated, if ordered  - Consider nutrition services referral to assist patient with adequate nutrition and appropriate food choices  Outcome: Progressing  Goal: Maintains or returns to baseline bowel function  Description: INTERVENTIONS:  - Assess bowel function  - Encourage oral fluids to ensure adequate hydration  - Administer IV fluids if ordered to ensure adequate hydration  - Administer ordered medications as needed  - Encourage mobilization and activity  - Consider nutritional services referral to assist patient with adequate nutrition and appropriate food choices  Outcome: Progressing  Goal: Oral mucous membranes remain intact  Description: INTERVENTIONS  - Assess oral mucosa and hygiene practices  - Implement preventative oral hygiene regimen  - Implement oral medicated treatments as ordered  - Initiate Nutrition services referral as needed  Outcome: Progressing

## 2022-06-08 NOTE — ASSESSMENT & PLAN NOTE
On oxygen 2 L chronically  Likely due to OHS, COPD  Normal PA pressure on echo in 5/2021  On DuoNeb t i d, Symbicort in nursing home  · Continue 2 L oxygen during the day  · CPAP at HS on room air

## 2022-06-08 NOTE — ASSESSMENT & PLAN NOTE
· Atrial fibrillation prior to admission on metoprolol 100 mg b i d and pradaxa  · While NPO remains on diltiazem and heparin infusions

## 2022-06-08 NOTE — PLAN OF CARE
Problem: RESPIRATORY - ADULT  Goal: Achieves optimal ventilation and oxygenation  Description: INTERVENTIONS:  - Assess for changes in respiratory status  - Assess for changes in mentation and behavior  - Position to facilitate oxygenation and minimize respiratory effort  - Oxygen administered by appropriate delivery if ordered  - Initiate smoking cessation education as indicated  - Encourage broncho-pulmonary hygiene including cough, deep breathe, Incentive Spirometry  - Assess the need for suctioning and aspirate as needed  - Assess and instruct to report SOB or any respiratory difficulty  - Respiratory Therapy support as indicated  Outcome: Progressing     Problem: MOBILITY - ADULT  Goal: Maintain or return to baseline ADL function  Description: INTERVENTIONS:  -  Assess patient's ability to carry out ADLs; assess patient's baseline for ADL function and identify physical deficits which impact ability to perform ADLs (bathing, care of mouth/teeth, toileting, grooming, dressing, etc )  - Assess/evaluate cause of self-care deficits   - Assess range of motion  - Assess patient's mobility; develop plan if impaired  - Assess patient's need for assistive devices and provide as appropriate  - Encourage maximum independence but intervene and supervise when necessary  - Involve family in performance of ADLs  - Assess for home care needs following discharge   - Consider OT consult to assist with ADL evaluation and planning for discharge  - Provide patient education as appropriate  Outcome: Progressing  Goal: Maintains/Returns to pre admission functional level  Description: INTERVENTIONS:  - Perform BMAT or MOVE assessment daily    - Set and communicate daily mobility goal to care team and patient/family/caregiver     - Collaborate with rehabilitation services on mobility goals if consulted  -  Problem: Prexisting or High Potential for Compromised Skin Integrity  Goal: Skin integrity is maintained or improved  Description: INTERVENTIONS:  - Identify patients at risk for skin breakdown  - Assess and monitor skin integrity  - Assess and monitor nutrition and hydration status  - Monitor labs   - Assess for incontinence   - Turn and reposition patient  - Assist with mobility/ambulation  - Relieve pressure over bony prominences  - Avoid friction and shearing  - Provide appropriate hygiene as needed including keeping skin clean and dry  - Evaluate need for skin moisturizer/barrier cream  - Collaborate with interdisciplinary team   - Patient/family teaching  - Consider wound care consult   Outcome: Progressing   - Out of bed for toileting  - Record patient progress and toleration of activity level   Outcome: Progressing

## 2022-06-08 NOTE — PROGRESS NOTES
Progress Note - General Surgery   Jcarlos Pak 47 y o  male MRN: [id number]  Unit/Bed#: 2 [address] Encounter: [id number]    Assessment:  1) SBO - patient is still having significant NG tube output, passing minimal flatus, still showing signs of upper abdominal discomfort and some tympany with percussion, bowel sounds were active however   2) Reducible Ventral Hernia - soft, reducible, only minimal discomfort with palpation, no skin discoloration  3) Morbidly Obese - BMI 70      Plan:  1-3)   · Unable to complete CT secondary to 500lb limit of facility's CT machine  · Will discuss with attending if oral contrast administration and abd xrays would be an option  · Continue NPO, NG tube, IV fluid hydration  · Patient on heparin gtt inpatient, on Pradaxa at home for atrial fibrillation and history of DVT  · Patient education discussion at bedside about the current treatment plan and other alternatives if SBO doesn't resolve      Subjective/Objective     Subjective:  Patient denies any acute changes overnight  He is still passing occasional flatus  He is frustrated that there's still so much NG tube output  He states his abdominal discomfort is still about the same  It is a dull aching sensation that waxes and wanes in his upper abdomen  Objective:     Blood pressure 107/77, pulse (!) 109, temperature (!) 97 1 °F (36 2 °C), temperature source Oral, resp  rate 17, weight (!) 230 kg (506 lb 2 8 oz), SpO2 97 %  ,Body mass index is 70 6 kg/m²        Intake/Output Summary (Last 24 hours) at 6/8/2022 0903  Last data filed at 6/8/2022 0800  Gross per 24 hour   Intake --   Output 2900 ml   Net -2900 ml       Invasive Devices  Report    Peripheral Intravenous Line  Duration           Long-Dwell Peripheral IV (Midline) 06/06/22 Right Brachial 1 day    Peripheral IV 06/06/22 Distal;Right;Ventral (anterior) Forearm 1 day    Peripheral IV 06/06/22 Left Antecubital 1 day          Drain  Duration           NG/OG/Enteral Tube Nasogastric 16 Fr Right nare 4 days                Physical Exam: /77 (BP Location: Right arm)   Pulse (!) 109   Temp (!) 97 1 °F (36 2 °C) (Oral)   Resp 17   Wt (!) 230 kg (506 lb 2 8 oz)   SpO2 97%   BMI 70 60 kg/m²   General appearance: alert and oriented, in no acute distress and Morbidly obese  Head: Normocephalic, without obvious abnormality, atraumatic  Lungs: clear to anterior auscultation  Heart: Tachycardia   Abdomen: Soft, tenderness in the upper quadrants, active bowel sounds, soft and reducible ventral hernia, tympanic to percussion, some tenderness with percussion  Skin: Skin color, texture, turgor normal  No rashes or lesions  Scars on anterior abdomen from prior surgeries     Lab, Imaging and other studies:  I have personally reviewed pertinent lab results    , CBC: No results found for: WBC, HGB, HCT, MCV, PLT, ADJUSTEDWBC, MCH, MCHC, RDW, MPV, NRBC, CMP:   Lab Results   Component Value Date    SODIUM 141 06/08/2022    K 3 9 06/08/2022    CL 97 (L) 06/08/2022    CO2 37 (H) 06/08/2022    BUN 10 06/08/2022    CREATININE 0 92 06/08/2022    CALCIUM 9 3 06/08/2022    EGFR 95 06/08/2022     VTE Pharmacologic Prophylaxis: Heparin gtt  VTE Mechanical Prophylaxis: sequential compression device

## 2022-06-08 NOTE — ASSESSMENT & PLAN NOTE
· Status post bariatric surgery currently resides in nursing facility  · Body mass index is 71 83 kg/m²

## 2022-06-08 NOTE — UTILIZATION REVIEW
Continued Stay Review    Date: 6/8                          Current Patient Class: INpatient   Current Level of Care: Med/surg    HPI:52 y o  male initially admitted on 6/6    Assessment/Plan: Continues with significant NG tube output and passing minimal flatus  As per Gen/surg, unable to get CT at currently facility due to 500 lb limit of facilities CT machine  Keep NPO  Consider xray with po contrast   Continue heparin drip  Abdomen soft, tender upper quads with + bowel sounds, soft, reducible ventral hernia  Continue with iV metoprolol  Continues with 2LNC  Heart rate occasionally > 100       Vital Signs:   Time Temp Pulse Resp BP MAP (mmHg) SpO2 Calculated FIO2 (%) - Nasal Cannula Nasal Cannula O2 Flow Rate (L/min) O2 Device O2 Interface Device Patient Position - Orthostatic VS   06/08/22 10:28:26 97 4 °F (36 3 °C) Abnormal  88 18 112/77 89 95 % 28 2 L/min Nasal cannula -- --   06/08/22 07:47:25 97 1 °F (36 2 °C) Abnormal  109 Abnormal  17 107/77 87 97 % 28 2 L/min Nasal cannula -- Lying   06/08/22 0720 -- -- -- -- -- 97 % 28 2 L/min Nasal cannula -- --   06/08/22 0401 -- 115 Abnormal  -- -- -- -- -- -- -- -- --   06/08/22 03:54:14 -- 65 -- 113/80 91 96 % -- -- -- -- --   06/08/22 0328 -- -- -- -- -- 94 % -- -- -- Full face mask --   06/08/22 0322 -- 115 Abnormal  -- -- -- -- -- -- -- -- --   06/08/22 0300 -- -- -- 108/80 -- -- -- -- -- -- --   06/08/22 0248 -- 130 Abnormal  -- -- -- -- -- -- -- -- --   06/08/22 00:53:28 -- 68 -- 132/82 99 95 % -- -- -- -- --     Pertinent Labs/Diagnostic Results:       Results from last 7 days   Lab Units 06/06/22  0520 06/05/22  0558 06/04/22  0056   WBC Thousand/uL 6 21 6 90 6 94   HEMOGLOBIN g/dL 12 6 12 5 13 5   HEMATOCRIT % 40 2 39 2 41 3   PLATELETS Thousands/uL 212 228 223   NEUTROS ABS Thousands/µL 4 43 5 23 5 13         Results from last 7 days   Lab Units 06/08/22  0602 06/07/22  0620 06/06/22  2315 06/06/22  0520 06/05/22  0558 06/04/22  0056   SODIUM mmol/L 141 144 --  142 140 136   POTASSIUM mmol/L 3 9 3 8 3 6 3 9 4 0 4 0   CHLORIDE mmol/L 97* 99*  --  93* 94* 90*   CO2 mmol/L 37* 41*  --  38* 39* 40*   ANION GAP mmol/L 7 4  --  11 7 6   BUN mg/dL 10 13  --  15 15 14   CREATININE mg/dL 0 92 0 78  --  0 83 0 85 0 96   EGFR ml/min/1 73sq m 95 104  --  101 100 91   CALCIUM mg/dL 9 3 9 3  --  9 9 9 8 10 0   MAGNESIUM mg/dL  --  2 5 2 4  --  2 2  --    PHOSPHORUS mg/dL  --   --   --   --  4 0  --      Results from last 7 days   Lab Units 06/05/22  0558 06/04/22  0056   AST U/L 17 29   ALT U/L 18 20   ALK PHOS U/L 74 73   TOTAL PROTEIN g/dL 7 4 8 0   ALBUMIN g/dL 3 7 3 8   TOTAL BILIRUBIN mg/dL 0 54 0 60         Results from last 7 days   Lab Units 06/08/22  0602 06/07/22  0620 06/06/22  0520 06/05/22  0558 06/04/22  0056   GLUCOSE RANDOM mg/dL 127 122 78 129 142*         Results from last 7 days   Lab Units 06/07/22  0620   HEMOGLOBIN A1C % 5 6   EAG mg/dl 114       Results from last 7 days   Lab Units 06/08/22  1305 06/08/22  0602 06/07/22  2253 06/04/22  1329 06/04/22  0654   PROTIME seconds  --   --   --   --  14 4   INR   --   --   --   --  1 13   PTT seconds 49* 80* 49*   < > 33    < > = values in this interval not displayed       Results from last 7 days   Lab Units 06/04/22  0056   LACTIC ACID mmol/L 1 3       Results from last 7 days   Lab Units 06/05/22  0558 06/04/22  0056   LIPASE u/L 177* 210*       Medications:   Scheduled Medications:  ammonium lactate, , Topical, BID  budesonide-formoterol, 2 puff, Inhalation, BID  buPROPion, 450 mg, Oral, Daily  famotidine, 20 mg, Intravenous, Q12H MARY  fluticasone, 1 spray, Each Nare, Daily  furosemide, 20 mg, Intravenous, Daily  iohexol, 50 mL, Oral, Once  levalbuterol, 1 25 mg, Nebulization, TID   And  sodium chloride, 3 mL, Nebulization, TID  nystatin, , Topical, BID      Continuous IV Infusions:  dextrose 5 % and sodium chloride 0 45 % with KCl 20 mEq/L, 125 mL/hr, Intravenous, Continuous  diltiazem, 5 mg/hr, Intravenous, Continuous  heparin (porcine), 3-32 Units/kg/hr (Order-Specific), Intravenous, Titrated      PRN Meds:  acetaminophen, 325 mg, Rectal, Q4H PRN  barium, 900 mL, Oral, Once in imaging  heparin (porcine), 2,000 Units, Intravenous, Q1H PRN  heparin (porcine), 4,000 Units, Intravenous, Q1H PRN  levalbuterol, 0 63 mg, Nebulization, Q4H PRN  morphine injection, 4 mg, Intravenous, Q4H PRN  morphine injection, 2 mg, Intravenous, Q4H PRN x6 in 24 hours  ondansetron, 4 mg, Intravenous, Q4H PRN  promethazine, 25 mg, Intravenous, Q6H PRN        Discharge Plan: D    Network Utilization Review Department  ATTENTION: Please call with any questions or concerns to [phone] and carefully listen to the prompts so that you are directed to the right person  All voicemails are confidential   Ladonna Mayes all requests for admission clinical reviews, approved or denied determinations and any other requests to dedicated fax number below belonging to the campus where the patient is receiving treatment   List of dedicated fax numbers for the Facilities:  [address] DENIALS (Administrative/Medical Necessity) [phone]   [address] (Maternity/NICU/Pediatrics) [phone]   [address]  [address] [facility] [place] [address] [address] [place] [address] [address] [facility] [facility] [phone]

## 2022-06-08 NOTE — ASSESSMENT & PLAN NOTE
Lab Results   Component Value Date    HGBA1C 5 6 06/07/2022     · History of diabetes  · Not on any medications in nursing home

## 2022-06-08 NOTE — WOUND OSTOMY CARE
Progress Note - Wound   Annitta Cogan 47 y o  male MRN: [id number]  Unit/Bed#: [address] Encounter: [id number]      Assessment: This is a 47year old male patient admitted on 6/6/22 with small bowel obstruction  He has a history of DM, HTN, DVT, COPD and morbid obesity  He was awake, alert & oriented x 3 with nasogastric tube in place  He is totally dependent upon nursing staff for all of his care and requires maximum assist to be repositioned in bed  He is continent of urine and is passing flatus but has not had a bowel movement yet  Assessment Findings:  1-No open areas to abdominal or inguinal skin folds  Orders for prevention written  2-No open areas to bilateral heels  Orders for prevention written  3-Patient refused to be turned in bed for inspection of sacrobuttock area due to abdominal discomfort - Jie RN made aware  Patient & Keeley Carrington RN state that sacrobuttock area is intact  Orders written for prevention  Plan:   Skin care Plan:  1-Cleanse sacro-buttocks with soap and water  Apply Allevyn foam  Aryan with T for treatment  Change every two days and PRN  check skin q-shift  2-Turn/reposition q2h or when medically stable for pressure re-distribution on skin   3-Heel protectors to bilateral heels to offload pressure  If patient refuses, must FLOAT heels on a pillow so that feet/heels are not in contact with mattress (even though it is a specialty mattress it can still cause pressure)  4-Moisturize skin daily with skin nourishing cream  5-Ehob cushion in chair when out of bed  6-Hydraguard to bilateral heels BID and PRN  7-Cleanse bilateral abdominal and inguinal skin folds with foaming cleanser & dry well  Apply Interdry to bilateral skin folds in a single layer  Do not use with antifungal powder or creams as this will interfere with action of silver impregnated in dressing   Do not discard Interdry - wash with soap & water and hang to dry - may be reused multiple times - please give to patient to take home  8-Bariatric bed with pressure redistribution mattress (in place)  Wound 03/16/22 Thigh Anterior;Left;Proximal (Active)   Wound Image   06/08/22 1316   Wound Description Intact 06/08/22 1316   Drainage Amount Scant 06/08/22 1316   Drainage Description Serous 06/08/22 1316   Dressing Status Clean;Dry; Intact 06/08/22 1316     Discussed assessment findings, and plan of care/recommendations with Aurora Kaur RN  Wound care signing off at this time, please call or tiger text with questions and concerns  Recommendations written as orders    Laurie Dela Cruz RN, BSN, [facility]

## 2022-06-08 NOTE — ASSESSMENT & PLAN NOTE
Wt Readings from Last 3 Encounters:   06/10/22 (!) 234 kg (515 lb)   06/06/22 (!) 226 kg (498 lb 3 8 oz)   06/02/22 (!) 232 kg (512 lb)     · Prior to admission on torsemide and spironolactone    · No evidence of acute decompensation  · Hold on hold while NPO  · Continue IV fluid hydration but monitor volume status closely which may be difficult

## 2022-06-08 NOTE — ASSESSMENT & PLAN NOTE
67-year-old gentleman morbidly obese with history of bariatric surgery currently resides at [address] presented to [facility] ED for abdominal pain and transferred here to [facility] for surgical evaluation  · Initially had NG tube which was clamped and discontinued  · Patient remains NPO and given recurrence of symptoms attempted to obtain imaging but patient cannot have CT scan here due to weight  · NG tube to be replaced by surgery today

## 2022-06-08 NOTE — DISCHARGE INSTR - OTHER ORDERS
Skin care Plan:  1-Cleanse sacro-buttocks with soap and water  Apply Allevyn foam  Aryan with T for treatment  Change every two days and PRN  check skin q-shift  2-Turn/reposition q2h or when medically stable for pressure re-distribution on skin   3-Heel protectors to bilateral heels to offload pressure  If patient refuses, must FLOAT heels on a pillow so that feet/heels are not in contact with mattress (even though it is a specialty mattress it can still cause pressure)  4-Moisturize skin daily with skin nourishing cream  5-Ehob cushion in chair when out of bed  6-Hydraguard to bilateral heels BID and PRN  7-Cleanse bilateral abdominal and inguinal skin folds with foaming cleanser & dry well  Apply Interdry to bilateral skin folds in a single layer  Do not use with antifungal powder or creams as this will interfere with action of silver impregnated in dressing  Do not discard Interdry - wash with soap & water and hang to dry - may be reused multiple times - please give to patient to take home  8-Bariatric bed with pressure redistribution mattress

## 2022-06-09 ENCOUNTER — APPOINTMENT (INPATIENT)
Dept: RADIOLOGY | Facility: HOSPITAL | Age: 52
DRG: 224 | End: 2022-06-09
Payer: COMMERCIAL

## 2022-06-09 LAB
ANION GAP SERPL CALCULATED.3IONS-SCNC: 5 MMOL/L (ref 4–13)
APTT PPP: 75 SECONDS (ref 23–37)
BUN SERPL-MCNC: 8 MG/DL (ref 5–25)
CALCIUM SERPL-MCNC: 9.1 MG/DL (ref 8.3–10.1)
CHLORIDE SERPL-SCNC: 94 MMOL/L (ref 100–108)
CO2 SERPL-SCNC: 36 MMOL/L (ref 21–32)
CREAT SERPL-MCNC: 0.82 MG/DL (ref 0.6–1.3)
ERYTHROCYTE [DISTWIDTH] IN BLOOD BY AUTOMATED COUNT: 14.8 % (ref 11.6–15.1)
GFR SERPL CREATININE-BSD FRML MDRD: 102 ML/MIN/1.73SQ M
GLUCOSE SERPL-MCNC: 140 MG/DL (ref 65–140)
HCT VFR BLD AUTO: 38.3 % (ref 36.5–49.3)
HGB BLD-MCNC: 12.3 G/DL (ref 12–17)
MAGNESIUM SERPL-MCNC: 2.2 MG/DL (ref 1.6–2.6)
MCH RBC QN AUTO: 30.2 PG (ref 26.8–34.3)
MCHC RBC AUTO-ENTMCNC: 32.1 G/DL (ref 31.4–37.4)
MCV RBC AUTO: 94 FL (ref 82–98)
PHOSPHATE SERPL-MCNC: 3.9 MG/DL (ref 2.7–4.5)
PLATELET # BLD AUTO: 168 THOUSANDS/UL (ref 149–390)
PMV BLD AUTO: 10.1 FL (ref 8.9–12.7)
POTASSIUM SERPL-SCNC: 3.8 MMOL/L (ref 3.5–5.3)
RBC # BLD AUTO: 4.07 MILLION/UL (ref 3.88–5.62)
SODIUM SERPL-SCNC: 135 MMOL/L (ref 136–145)
WBC # BLD AUTO: 7.09 THOUSAND/UL (ref 4.31–10.16)

## 2022-06-09 PROCEDURE — 83735 ASSAY OF MAGNESIUM: CPT | Performed by: PHYSICIAN ASSISTANT

## 2022-06-09 PROCEDURE — 74022 RADEX COMPL AQT ABD SERIES: CPT

## 2022-06-09 PROCEDURE — 94640 AIRWAY INHALATION TREATMENT: CPT

## 2022-06-09 PROCEDURE — 84100 ASSAY OF PHOSPHORUS: CPT | Performed by: PHYSICIAN ASSISTANT

## 2022-06-09 PROCEDURE — 99024 POSTOP FOLLOW-UP VISIT: CPT | Performed by: SPECIALIST

## 2022-06-09 PROCEDURE — 99232 SBSQ HOSP IP/OBS MODERATE 35: CPT | Performed by: FAMILY MEDICINE

## 2022-06-09 PROCEDURE — 94660 CPAP INITIATION&MGMT: CPT

## 2022-06-09 PROCEDURE — 94760 N-INVAS EAR/PLS OXIMETRY 1: CPT

## 2022-06-09 PROCEDURE — 80048 BASIC METABOLIC PNL TOTAL CA: CPT | Performed by: FAMILY MEDICINE

## 2022-06-09 PROCEDURE — 85730 THROMBOPLASTIN TIME PARTIAL: CPT | Performed by: FAMILY MEDICINE

## 2022-06-09 PROCEDURE — 85027 COMPLETE CBC AUTOMATED: CPT | Performed by: FAMILY MEDICINE

## 2022-06-09 RX ORDER — POLYETHYLENE GLYCOL 3350 17 G/17G
17 POWDER, FOR SOLUTION ORAL ONCE
Status: COMPLETED | OUTPATIENT
Start: 2022-06-09 | End: 2022-06-09

## 2022-06-09 RX ORDER — METOPROLOL TARTRATE 5 MG/5ML
5 INJECTION INTRAVENOUS ONCE
Status: COMPLETED | OUTPATIENT
Start: 2022-06-09 | End: 2022-06-09

## 2022-06-09 RX ADMIN — MORPHINE SULFATE 4 MG: 4 INJECTION INTRAVENOUS at 18:51

## 2022-06-09 RX ADMIN — FLUTICASONE PROPIONATE 1 SPRAY: 50 SPRAY, METERED NASAL at 09:02

## 2022-06-09 RX ADMIN — FAMOTIDINE 20 MG: 10 INJECTION, SOLUTION INTRAVENOUS at 09:00

## 2022-06-09 RX ADMIN — BUDESONIDE AND FORMOTEROL FUMARATE DIHYDRATE 2 PUFF: 160; 4.5 AEROSOL RESPIRATORY (INHALATION) at 09:02

## 2022-06-09 RX ADMIN — HEPARIN SODIUM 33.1 UNITS/KG/HR: 10000 INJECTION, SOLUTION INTRAVENOUS at 15:59

## 2022-06-09 RX ADMIN — POLYETHYLENE GLYCOL 3350 17 G: 17 POWDER, FOR SOLUTION ORAL at 16:00

## 2022-06-09 RX ADMIN — BUDESONIDE AND FORMOTEROL FUMARATE DIHYDRATE 2 PUFF: 160; 4.5 AEROSOL RESPIRATORY (INHALATION) at 21:23

## 2022-06-09 RX ADMIN — LEVALBUTEROL HYDROCHLORIDE 1.25 MG: 1.25 SOLUTION, CONCENTRATE RESPIRATORY (INHALATION) at 19:14

## 2022-06-09 RX ADMIN — MUPIROCIN 1 APPLICATION: 20 OINTMENT TOPICAL at 21:23

## 2022-06-09 RX ADMIN — Medication: at 18:23

## 2022-06-09 RX ADMIN — DEXTROSE, SODIUM CHLORIDE, AND POTASSIUM CHLORIDE 125 ML/HR: 5; .45; .15 INJECTION INTRAVENOUS at 07:22

## 2022-06-09 RX ADMIN — HEPARIN SODIUM 33.1 UNITS/KG/HR: 10000 INJECTION, SOLUTION INTRAVENOUS at 06:50

## 2022-06-09 RX ADMIN — LEVALBUTEROL HYDROCHLORIDE 1.25 MG: 1.25 SOLUTION, CONCENTRATE RESPIRATORY (INHALATION) at 07:07

## 2022-06-09 RX ADMIN — Medication: at 09:00

## 2022-06-09 RX ADMIN — DEXTROSE, SODIUM CHLORIDE, AND POTASSIUM CHLORIDE 125 ML/HR: 5; .45; .15 INJECTION INTRAVENOUS at 16:00

## 2022-06-09 RX ADMIN — METOPROLOL TARTRATE 5 MG: 1 INJECTION, SOLUTION INTRAVENOUS at 10:03

## 2022-06-09 RX ADMIN — DILTIAZEM HYDROCHLORIDE 15 MG/HR: 5 INJECTION INTRAVENOUS at 19:36

## 2022-06-09 RX ADMIN — MORPHINE SULFATE 2 MG: 2 INJECTION, SOLUTION INTRAMUSCULAR; INTRAVENOUS at 07:24

## 2022-06-09 RX ADMIN — LEVALBUTEROL HYDROCHLORIDE 1.25 MG: 1.25 SOLUTION, CONCENTRATE RESPIRATORY (INHALATION) at 13:07

## 2022-06-09 RX ADMIN — MUPIROCIN 1 APPLICATION: 20 OINTMENT TOPICAL at 09:02

## 2022-06-09 RX ADMIN — BUPROPION 450 MG: 150 TABLET, EXTENDED RELEASE ORAL at 09:00

## 2022-06-09 RX ADMIN — DILTIAZEM HYDROCHLORIDE 15 MG/HR: 5 INJECTION INTRAVENOUS at 10:02

## 2022-06-09 RX ADMIN — ISODIUM CHLORIDE 3 ML: 0.03 SOLUTION RESPIRATORY (INHALATION) at 13:07

## 2022-06-09 RX ADMIN — FAMOTIDINE 20 MG: 10 INJECTION, SOLUTION INTRAVENOUS at 21:23

## 2022-06-09 RX ADMIN — DILTIAZEM HYDROCHLORIDE 15 MG/HR: 5 INJECTION INTRAVENOUS at 00:02

## 2022-06-09 RX ADMIN — ISODIUM CHLORIDE 3 ML: 0.03 SOLUTION RESPIRATORY (INHALATION) at 07:07

## 2022-06-09 RX ADMIN — MORPHINE SULFATE 4 MG: 4 INJECTION INTRAVENOUS at 14:12

## 2022-06-09 RX ADMIN — MORPHINE SULFATE 2 MG: 2 INJECTION, SOLUTION INTRAMUSCULAR; INTRAVENOUS at 00:40

## 2022-06-09 RX ADMIN — ISODIUM CHLORIDE 3 ML: 0.03 SOLUTION RESPIRATORY (INHALATION) at 19:14

## 2022-06-09 RX ADMIN — MORPHINE SULFATE 4 MG: 4 INJECTION INTRAVENOUS at 23:01

## 2022-06-09 NOTE — PLAN OF CARE
Problem: RESPIRATORY - ADULT  Goal: Achieves optimal ventilation and oxygenation  Description: INTERVENTIONS:  - Assess for changes in respiratory status  - Assess for changes in mentation and behavior  - Position to facilitate oxygenation and minimize respiratory effort  - Oxygen administered by appropriate delivery if ordered  - Initiate smoking cessation education as indicated  - Encourage broncho-pulmonary hygiene including cough, deep breathe, Incentive Spirometry  - Assess the need for suctioning and aspirate as needed  - Assess and instruct to report SOB or any respiratory difficulty  - Respiratory Therapy support as indicated  Outcome: Progressing     Problem: MOBILITY - ADULT  Goal: Maintain or return to baseline ADL function  Description: INTERVENTIONS:  -  Assess patient's ability to carry out ADLs; assess patient's baseline for ADL function and identify physical deficits which impact ability to perform ADLs (bathing, care of mouth/teeth, toileting, grooming, dressing, etc )  - Assess/evaluate cause of self-care deficits   - Assess range of motion  - Assess patient's mobility; develop plan if impaired  - Assess patient's need for assistive devices and provide as appropriate  - Encourage maximum independence but intervene and supervise when necessary  - Involve family in performance of ADLs  - Assess for home care needs following discharge   - Consider OT consult to assist with ADL evaluation and planning for discharge  - Provide patient education as appropriate  Outcome: Progressing  Goal: Maintains/Returns to pre admission functional level  Description: INTERVENTIONS:  - Perform BMAT or MOVE assessment daily    - Set and communicate daily mobility goal to care team and patient/family/caregiver  - Collaborate with rehabilitation services on mobility goals if consulted  - Perform Range of Motion 3 times a day  - Reposition patient every 3 hours    - Dangle patient 3 times a day  - Stand patient 3 times a day  - Ambulate patient 3 times a day  - Out of bed to chair 3 times a day   - Out of bed for meals 3 times a day  - Out of bed for toileting  - Record patient progress and toleration of activity level   Outcome: Progressing     Problem: Potential for Falls  Goal: Patient will remain free of falls  Description: INTERVENTIONS:  - Educate patient/family on patient safety including physical limitations  - Instruct patient to call for assistance with activity   - Consult OT/PT to assist with strengthening/mobility   - Keep Call bell within reach  - Keep bed low and locked with side rails adjusted as appropriate  - Keep care items and personal belongings within reach  - Initiate and maintain comfort rounds  - Make Fall Risk Sign visible to staff  - Offer Toileting every 2 Hours, in advance of need  - Initiate/Maintain 1alarm  - Obtain necessary fall risk management equipment: call bell use  - Apply yellow socks and bracelet for high fall risk patients  - Consider moving patient to room near nurses station  Outcome: Progressing     Problem: Prexisting or High Potential for Compromised Skin Integrity  Goal: Skin integrity is maintained or improved  Description: INTERVENTIONS:  - Identify patients at risk for skin breakdown  - Assess and monitor skin integrity  - Assess and monitor nutrition and hydration status  - Monitor labs   - Assess for incontinence   - Turn and reposition patient  - Assist with mobility/ambulation  - Relieve pressure over bony prominences  - Avoid friction and shearing  - Provide appropriate hygiene as needed including keeping skin clean and dry  - Evaluate need for skin moisturizer/barrier cream  - Collaborate with interdisciplinary team   - Patient/family teaching  - Consider wound care consult   Outcome: Progressing     Problem: CARDIOVASCULAR - ADULT  Goal: Maintains optimal cardiac output and hemodynamic stability  Description: INTERVENTIONS:  - Monitor I/O, vital signs and rhythm  - Monitor for S/S and trends of decreased cardiac output  - Administer and titrate ordered vasoactive medications to optimize hemodynamic stability  - Assess quality of pulses, skin color and temperature  - Assess for signs of decreased coronary artery perfusion  - Instruct patient to report change in severity of symptoms  Outcome: Progressing  Goal: Absence of cardiac dysrhythmias or at baseline rhythm  Description: INTERVENTIONS:  - Continuous cardiac monitoring, vital signs, obtain 12 lead EKG if ordered  - Administer antiarrhythmic and heart rate control medications as ordered  - Monitor electrolytes and administer replacement therapy as ordered  Outcome: Progressing     Problem: GASTROINTESTINAL - ADULT  Goal: Minimal or absence of nausea and/or vomiting  Description: INTERVENTIONS:  - Administer IV fluids if ordered to ensure adequate hydration  - Maintain NPO status until nausea and vomiting are resolved  - Nasogastric tube if ordered  - Administer ordered antiemetic medications as needed  - Provide nonpharmacologic comfort measures as appropriate  - Advance diet as tolerated, if ordered  - Consider nutrition services referral to assist patient with adequate nutrition and appropriate food choices  Outcome: Progressing  Goal: Maintains or returns to baseline bowel function  Description: INTERVENTIONS:  - Assess bowel function  - Encourage oral fluids to ensure adequate hydration  - Administer IV fluids if ordered to ensure adequate hydration  - Administer ordered medications as needed  - Encourage mobilization and activity  - Consider nutritional services referral to assist patient with adequate nutrition and appropriate food choices  Outcome: Progressing  Goal: Oral mucous membranes remain intact  Description: INTERVENTIONS  - Assess oral mucosa and hygiene practices  - Implement preventative oral hygiene regimen  - Implement oral medicated treatments as ordered  - Initiate Nutrition services referral as needed  Outcome: Progressing     Problem: Nutrition/Hydration-ADULT  Goal: Nutrient/Hydration intake appropriate for improving, restoring or maintaining nutritional needs  Description: Monitor and assess patient's nutrition/hydration status for malnutrition  Collaborate with interdisciplinary team and initiate plan and interventions as ordered  Monitor patient's weight and dietary intake as ordered or per policy  Utilize nutrition screening tool and intervene as necessary  Determine patient's food preferences and provide high-protein, high-caloric foods as appropriate       INTERVENTIONS:  - Monitor oral intake, urinary output, labs, and treatment plans  - Assess nutrition and hydration status and recommend course of action  - Evaluate amount of meals eaten  - Assist patient with eating if necessary   - Allow adequate time for meals  - Recommend/ encourage appropriate diets, oral nutritional supplements, and vitamin/mineral supplements  - Order, calculate, and assess calorie counts as needed  - Recommend, monitor, and adjust tube feedings and TPN/PPN based on assessed needs  - Assess need for intravenous fluids  - Provide specific nutrition/hydration education as appropriate  - Include patient/family/caregiver in decisions related to nutrition  Outcome: Progressing

## 2022-06-09 NOTE — PROGRESS NOTES
[facility] Progress Note  Patient: Jamee Boykin 47 y o  male   MRN: [id number]  PCP: Dann Contreras MD  Unit/Bed#: 2 [facility] Encounter: [id number]  Date Of Visit: 06/09/22    Problem List:    Principal Problem:    SBO  Active Problems:    Atrial fibrillation with rapid ventricular response (HCC)    Chronic diastolic heart failure ([facility] [address] )    Morbid obesity ([facility] )    Obesity hypoventilation syndrome/FILIPPO    COPD (chronic obstructive pulmonary disease) ([facility] )    Hypothyroidism    History of DVT (deep vein thrombosis)    Chronic respiratory failure with hypoxia ([facility] )    Hypertension    Diabetes mellitus ([facility] )    Depression      Assessment & Plan:    * SBO  Assessment & Plan  Patient presented to [place] ED on 6/4 for abdominal pain nausea vomiting for 1 day  CT showed small bowel obstruction at the proximal jejunum with transition point at the left upper abdomen  History of cholecystectomy, hernia repair, gastric bypass  Had SBO 1 year ago, required surgery per patient  Patient transferred to [place] due to needing ICU support following any potential surgery due to multiple comorbidities  · NG tube clamp trial today per surgery  · NPO  · Surgery consulted and appreciate their input  · Repeat CT scan with oral contrast could not be done due to weight limit  · Obstruction series shows persistent partial SBO    Atrial fibrillation with rapid ventricular response [facility])  Assessment & Plan  Patient is on metoprolol 100 mg p o  B i d , Pradaxa 150 b i d  in nursing home  · EKG AFib with RVR, rate 104 on 6/4  · Discontinued IV metoprolol and patient given IV Cardizem IVP and has been started on Cardizem drip  Titrated up to 15 mg/hr with improvement in heart rate today    Required 1 time dose of IV Lopressor earlier today  · Continue heparin drip ACS protocol from [facility] while holding Pradaxa    Chronic diastolic heart failure [facility])  Assessment & Plan  Wt Readings from Last 3 Encounters:   06/09/22 (!) 234 kg (515 lb 14 oz)   06/06/22 (!) 226 kg (498 lb 3 8 oz)   06/02/22 (!) 232 kg (512 lb)     Patient is Demadex 40 p o  B i d, spirolactone 50 p o  Daily from nursing home  2D echo May 2021 showed normal EF  · Continue to hold Demadex and spironolactone due to NPO  · Discontinued IV Lasix 20 daily   · Continue IV hydration  · Daily weight and I&Os        Depression  Assessment & Plan  Continue Wellbutrin    Diabetes mellitus [facility])  Assessment & Plan  Lab Results   Component Value Date    HGBA1C 5 6 06/07/2022     · History of diabetes  · Not on any medications in nursing home    Hypertension  Assessment & Plan  On metoprolol in nursing home  · BPs stable on Cardizem drip    Chronic respiratory failure with hypoxia (HCC)  Assessment & Plan  On oxygen 2 L chronically  Likely due to obesity hypoventilation syndrome, COPD  Normal PA pressure on echo in 5/2021  On DuoNeb t i d , Symbicort in nursing home  · Continue 2 L oxygen during the day  · CPAP at HS on room air  History of DVT (deep vein thrombosis)  Assessment & Plan  Patient is on Pradaxa 150 b i d  In nursing  · Continue heparin drip ACS protocol  · Switch to Pradaxa once cleared by surgery    Hypothyroidism  Assessment & Plan  Continue Synthroid  · Recent TSH normal    COPD (chronic obstructive pulmonary disease) (HCC)  Assessment & Plan  No wheezing on auscultation  · Continue Symbicort  · Xopenex t i d  And p r n  Obesity hypoventilation syndrome/FILIPPO  Assessment & Plan  · Continue 2 L O2 during the day and CPAP QHS    Morbid obesity (HCC)  Assessment & Plan  Status post gastric bypass  · He is wheelchair-bound in nursing home  Body mass index is 71 95 kg/m²  VTE Pharmacologic Prophylaxis: VTE Score: 10 High Risk (Score >/= 5) - Pharmacological DVT Prophylaxis Ordered: heparin drip  Sequential Compression Devices Ordered  Patient Centered Rounds: I performed bedside rounds with nursing staff today    Discussions with Specialists or Other Care Team Provider: yes - surgery    Education and Discussions with Family / Patient: Patient declined call to   Time Spent for Care: 30 minutes  More than 50% of total time spent on counseling and coordination of care as described above  Current Length of Stay: 3 day(s)  Current Patient Status: Inpatient   Certification Statement: The patient will continue to require additional inpatient hospital stay due to SBO, AFib with RVR  Discharge Plan: Anticipate discharge in >72 hrs to rehab facility  Code Status: Level 1 - Full Code    Subjective:     Reports some abdominal pain, 5/10 in intensity  No nausea or vomiting  Passing some gas    Objective:     Vitals:   Temp (24hrs), Av 5 °F (36 9 °C), Min:97 4 °F (36 3 °C), Max:99 1 °F (37 3 °C)    Temp:  [97 4 °F (36 3 °C)-99 1 °F (37 3 °C)] 98 8 °F (37 1 °C)  HR:  [] 121  Resp:  [18-20] 20  BP: (102-137)/(62-78) 119/73  SpO2:  [92 %-100 %] 94 %  Body mass index is 71 95 kg/m²  Input and Output Summary (last 24 hours): Intake/Output Summary (Last 24 hours) at 2022 0949  Last data filed at 2022 0720  Gross per 24 hour   Intake 950 ml   Output 2125 ml   Net -1175 ml       Physical Exam:   Physical Exam  Constitutional:       General: He is not in acute distress  Appearance: He is well-developed  HENT:      Head: Normocephalic and atraumatic  Eyes:      General: No scleral icterus  Cardiovascular:      Rate and Rhythm: Tachycardia present  Rhythm irregular  Pulmonary:      Effort: Pulmonary effort is normal  No respiratory distress  Breath sounds: No wheezing or rales  Comments: Decreased breath sounds bilaterally  Abdominal:      General: There is no distension  Palpations: Abdomen is soft  Tenderness: There is abdominal tenderness (Mild generalized)  There is no guarding        Comments: Ventral hernia which is soft   Neurological:      Mental Status: He is alert and oriented to person, place, and time  Additional Data:     Labs:  Results from last 7 days   Lab Units 22  0603 22  0520   WBC Thousand/uL 7 09 6 21   HEMOGLOBIN g/dL 12 3 12 6   HEMATOCRIT % 38 3 40 2   PLATELETS Thousands/uL 168 212   NEUTROS PCT %  --  70   LYMPHS PCT %  --  17   MONOS PCT %  --  9   EOS PCT %  --  2     Results from last 7 days   Lab Units 22  0603 22  0520 22  0558   SODIUM mmol/L 135*   < > 140   POTASSIUM mmol/L 3 8   < > 4 0   CHLORIDE mmol/L 94*   < > 94*   CO2 mmol/L 36*   < > 39*   BUN mg/dL 8   < > 15   CREATININE mg/dL 0 82   < > 0 85   ANION GAP mmol/L 5   < > 7   CALCIUM mg/dL 9 1   < > 9 8   ALBUMIN g/dL  --   --  3 7   TOTAL BILIRUBIN mg/dL  --   --  0 54   ALK PHOS U/L  --   --  74   ALT U/L  --   --  18   AST U/L  --   --  17   GLUCOSE RANDOM mg/dL 140   < > 129    < > = values in this interval not displayed       Results from last 7 days   Lab Units 22  0654   INR  1 13         Results from last 7 days   Lab Units 22  0620   HEMOGLOBIN A1C % 5 6     Results from last 7 days   Lab Units 22  0056   LACTIC ACID mmol/L 1 3       Lines/Drains:  Invasive Devices  Report    Peripheral Intravenous Line  Duration           Long-Dwell Peripheral IV (Midline) 22 Right Brachial 2 days    Peripheral IV 22 Distal;Right;Ventral (anterior) Forearm 2 days    Peripheral IV 22 Left Antecubital 2 days          Drain  Duration           NG/OG/Enteral Tube Nasogastric 16 Fr Right nare 5 days                  Telemetry:  Telemetry Orders (From admission, onward)             48 Hour Telemetry Monitoring  Continuous x 48 hours           References:    Telemetry Guidelines   Question:  Reason for 48 Hour Telemetry  Answer:  Arrhythmias Requiring Medical Therapy (eg  SVT, Vtach/fib, Bradycardia, Uncontrolled A-fib)                 Telemetry Reviewed: AFib with RVR  Indication for Continued Telemetry Use: Arrthymias requiring medical therapy Imaging: Reviewed radiology reports from this admission including: xray(s)    Recent Cultures (last 7 days):         Last 24 Hours Medication List:   Current Facility-Administered Medications   Medication Dose Route Frequency Provider Last Rate    acetaminophen  325 mg Rectal Q4H PRN RADHA Hernandez      ammonium lactate   Topical BID RADHA Hernandez      barium  900 mL Oral Once in imaging EsmeALESSANDRO Almeida-JAMES      budesonide-formoterol  2 puff Inhalation BID RADHA Hernandez      buPROPion  450 mg Oral Daily RADHA Hernandez      dextrose 5 % and sodium chloride 0 45 % with KCl 20 mEq/L  125 mL/hr Intravenous Continuous Maria Isabel Vecellio, PA-C 125 mL/hr (06/09/22 0722)    diltiazem  15 mg/hr Intravenous Continuous Maria Isabel Vecellio, PA-C 15 mg/hr (06/09/22 0002)    famotidine  20 mg Intravenous Q12H [address] RADHA Hernandez      fluticasone  1 spray Each Nare Daily RADHA Hernandez      heparin (porcine)  3-35 Units/kg/hr (Order-Specific) Intravenous Titrated Huong Revankar, DO 33 1 Units/kg/hr (06/09/22 0650)    heparin (porcine)  2,000 Units Intravenous Q1H PRN BANDAR HernandezNP      heparin (porcine)  4,000 Units Intravenous Q1H PRN Amber Jim, RADHA      levalbuterol  0 63 mg Nebulization Q4H PRN RADHA Hernandez      levalbuterol  1 25 mg Nebulization TID RADHA Hernandez      And    sodium chloride  3 mL Nebulization TID RADHA Hernandez      metoprolol  5 mg Intravenous Once Huong Revankar, DO      morphine injection  4 mg Intravenous Q4H PRN Amber Jim, BANDARNP      morphine injection  2 mg Intravenous Q4H PRN Amber Jim, RADHA      mupirocin   Nasal Q12H [address] Huong Revankar, DO      ondansetron  4 mg Intravenous Q4H PRN RADHA Hernandez      promethazine  25 mg Intravenous Q6H PRN RADHA Hernandez          Today, Patient Was Seen By: Jamia Nielsen DO    ** Please Note: "This note has been constructed using a voice recognition system  Therefore there may be syntax, spelling, and/or grammatical errors   Please call if you have any questions  "**

## 2022-06-09 NOTE — PROGRESS NOTES
Progress Note - General Surgery   So Garcias 47 y o  male MRN: [id number]  Unit/Bed#: [address] [place] [address] Encounter: [id number]    Assessment:  1) SBO - patient was having significant nausea and felt the need to of vomit after p o  Contrast administration and then was relieved with NG tube on low continuous suction, patient did have 2 4 L of output over the course the last 24 hours that is all bilious, NG tube does not appear post pyloric based on imaging, with that being said the imaging does seem to exhibit contrast transitioning over to right colon on x-rays, sinus tachycardia, AVSS otherwise, labs within normal limits  2) Reducible Ventral Hernia - soft, reducible, only minimal discomfort with palpation, no skin discoloration  3) Morbidly Obese - BMI 71 95    Plan:  1-4)   - reviewed abdominal x-rays and contrast transitioning to right colon  - continue therapeutic heparin  - NPO  - continue NG tube but will attempt clamp trial to test if patient has cleared obstruction  - CT unavailable due to weight of this patient  - IV fluid for hydration with 125 mL/hour lactated Ringer  - may need to consider transfer if patient fails clamp trial  - nursing to contact surgery general team if patient starts exhibiting signs of nausea or vomiting after clamp trial as started  - p r n  Analgesia  - p r n  Zofran    Subjective/Objective   Chief Complaint:  I am doing all right I am about the same    Subjective:  Patient was seen examined at bedside  Patient denies any acute events overnight  Patient does report that yesterday when he was receiving contrast due to his NG tube and it was not on suction that he did get nauseated  Patient felt the need to vomit but did not physically vomit  Patient does have some increase in discomfort in the upper quadrants  Patient then was put back to suction and notice that he drained a lot of green/bilious output    Patient then feels like after NG tube being put on suction that he had almost complete resolution of his symptoms and now is back to his baseline  Patient feels the same as yesterday and denies any substantial increase in pain  Patient yesterday was passing gas but denies any passing gas today  Objective:     Blood pressure 119/73, pulse (!) 121, temperature 98 8 °F (37 1 °C), resp  rate 20, weight (!) 234 kg (515 lb 14 oz), SpO2 94 %  ,Body mass index is 71 95 kg/m²  Intake/Output Summary (Last 24 hours) at 6/9/2022 1057  Last data filed at 6/9/2022 1002  Gross per 24 hour   Intake 950 ml   Output 2325 ml   Net -1375 ml       Invasive Devices  Report    Peripheral Intravenous Line  Duration           Long-Dwell Peripheral IV (Midline) 06/06/22 Right Brachial 2 days    Peripheral IV 06/06/22 Distal;Right;Ventral (anterior) Forearm 2 days    Peripheral IV 06/06/22 Left Antecubital 2 days          Drain  Duration           NG/OG/Enteral Tube Nasogastric 16 Fr Right nare 5 days                Physical Exam: /73   Pulse (!) 121   Temp 98 8 °F (37 1 °C)   Resp 20   Wt (!) 234 kg (515 lb 14 oz)   SpO2 94%   BMI 71 95 kg/m²   General appearance: alert and oriented, in no acute distress  Head: Normocephalic, without obvious abnormality, atraumatic  Lungs: wheezes  Heart: Sinus tachycardia  Abdomen: Have soft ventral hernia that is reducible, no skin discoloration, hypoactive bowel sounds, tenderness in the upper right quadrant and right lower quadrant in particular with maximal tenderness but diffusely having mild tenderness exhibited with palpation  Skin: Skin color, texture, turgor normal  No rashes or lesions or No major skin discoloration but overlying hernia does have very mild red appearance at the maximum point of hernia protruding    Lab, Imaging and other studies:  I have personally reviewed pertinent lab results    , CBC:   Lab Results   Component Value Date    WBC 7 09 06/09/2022    HGB 12 3 06/09/2022    HCT 38 3 06/09/2022    MCV 94 06/09/2022     06/09/2022 MCH 30 2 06/09/2022    MCHC 32 1 06/09/2022    RDW 14 8 06/09/2022    MPV 10 1 06/09/2022   , CMP:   Lab Results   Component Value Date    SODIUM 135 (L) 06/09/2022    K 3 8 06/09/2022    CL 94 (L) 06/09/2022    CO2 36 (H) 06/09/2022    BUN 8 06/09/2022    CREATININE 0 82 06/09/2022    CALCIUM 9 1 06/09/2022    EGFR 102 06/09/2022     VTE Pharmacologic Prophylaxis: Heparin  VTE Mechanical Prophylaxis: sequential compression device

## 2022-06-10 LAB
ANION GAP SERPL CALCULATED.3IONS-SCNC: 5 MMOL/L (ref 4–13)
APTT PPP: 48 SECONDS (ref 23–37)
BUN SERPL-MCNC: 7 MG/DL (ref 5–25)
CALCIUM SERPL-MCNC: 8.7 MG/DL (ref 8.3–10.1)
CHLORIDE SERPL-SCNC: 95 MMOL/L (ref 100–108)
CO2 SERPL-SCNC: 35 MMOL/L (ref 21–32)
CREAT SERPL-MCNC: 0.83 MG/DL (ref 0.6–1.3)
GFR SERPL CREATININE-BSD FRML MDRD: 101 ML/MIN/1.73SQ M
GLUCOSE SERPL-MCNC: 138 MG/DL (ref 65–140)
POTASSIUM SERPL-SCNC: 4.2 MMOL/L (ref 3.5–5.3)
SODIUM SERPL-SCNC: 135 MMOL/L (ref 136–145)

## 2022-06-10 PROCEDURE — 94660 CPAP INITIATION&MGMT: CPT

## 2022-06-10 PROCEDURE — 94760 N-INVAS EAR/PLS OXIMETRY 1: CPT

## 2022-06-10 PROCEDURE — 85730 THROMBOPLASTIN TIME PARTIAL: CPT | Performed by: FAMILY MEDICINE

## 2022-06-10 PROCEDURE — 94640 AIRWAY INHALATION TREATMENT: CPT

## 2022-06-10 PROCEDURE — 99024 POSTOP FOLLOW-UP VISIT: CPT | Performed by: SURGERY

## 2022-06-10 PROCEDURE — 99232 SBSQ HOSP IP/OBS MODERATE 35: CPT | Performed by: FAMILY MEDICINE

## 2022-06-10 PROCEDURE — 80048 BASIC METABOLIC PNL TOTAL CA: CPT | Performed by: FAMILY MEDICINE

## 2022-06-10 RX ORDER — DOCUSATE SODIUM 100 MG/1
100 CAPSULE, LIQUID FILLED ORAL 2 TIMES DAILY
Status: DISCONTINUED | OUTPATIENT
Start: 2022-06-10 | End: 2022-06-12

## 2022-06-10 RX ORDER — POLYETHYLENE GLYCOL 3350 17 G/17G
17 POWDER, FOR SOLUTION ORAL ONCE
Status: COMPLETED | OUTPATIENT
Start: 2022-06-10 | End: 2022-06-10

## 2022-06-10 RX ORDER — SODIUM PHOSPHATE, DIBASIC AND SODIUM PHOSPHATE, MONOBASIC 7; 19 G/133ML; G/133ML
1 ENEMA RECTAL ONCE
Status: COMPLETED | OUTPATIENT
Start: 2022-06-10 | End: 2022-06-10

## 2022-06-10 RX ADMIN — DILTIAZEM HYDROCHLORIDE 15 MG/HR: 5 INJECTION INTRAVENOUS at 03:39

## 2022-06-10 RX ADMIN — DOCUSATE SODIUM 100 MG: 100 CAPSULE, LIQUID FILLED ORAL at 17:01

## 2022-06-10 RX ADMIN — LEVALBUTEROL HYDROCHLORIDE 1.25 MG: 1.25 SOLUTION, CONCENTRATE RESPIRATORY (INHALATION) at 19:33

## 2022-06-10 RX ADMIN — DEXTROSE, SODIUM CHLORIDE, AND POTASSIUM CHLORIDE 100 ML/HR: 5; .45; .15 INJECTION INTRAVENOUS at 01:36

## 2022-06-10 RX ADMIN — LEVALBUTEROL HYDROCHLORIDE 1.25 MG: 1.25 SOLUTION, CONCENTRATE RESPIRATORY (INHALATION) at 07:01

## 2022-06-10 RX ADMIN — HEPARIN SODIUM 11.1 UNITS/KG/HR: 10000 INJECTION, SOLUTION INTRAVENOUS at 01:31

## 2022-06-10 RX ADMIN — DEXTROSE, SODIUM CHLORIDE, AND POTASSIUM CHLORIDE 100 ML/HR: 5; .45; .15 INJECTION INTRAVENOUS at 20:41

## 2022-06-10 RX ADMIN — MUPIROCIN 1 APPLICATION: 20 OINTMENT TOPICAL at 08:00

## 2022-06-10 RX ADMIN — DEXTROSE, SODIUM CHLORIDE, AND POTASSIUM CHLORIDE 100 ML/HR: 5; .45; .15 INJECTION INTRAVENOUS at 12:00

## 2022-06-10 RX ADMIN — ISODIUM CHLORIDE 3 ML: 0.03 SOLUTION RESPIRATORY (INHALATION) at 07:01

## 2022-06-10 RX ADMIN — POLYETHYLENE GLYCOL 3350 17 G: 17 POWDER, FOR SOLUTION ORAL at 08:48

## 2022-06-10 RX ADMIN — FAMOTIDINE 20 MG: 10 INJECTION, SOLUTION INTRAVENOUS at 20:16

## 2022-06-10 RX ADMIN — ISODIUM CHLORIDE 3 ML: 0.03 SOLUTION RESPIRATORY (INHALATION) at 15:00

## 2022-06-10 RX ADMIN — BUPROPION 450 MG: 150 TABLET, EXTENDED RELEASE ORAL at 08:00

## 2022-06-10 RX ADMIN — DILTIAZEM HYDROCHLORIDE 15 MG/HR: 5 INJECTION INTRAVENOUS at 20:41

## 2022-06-10 RX ADMIN — MUPIROCIN 1 APPLICATION: 20 OINTMENT TOPICAL at 20:16

## 2022-06-10 RX ADMIN — BUDESONIDE AND FORMOTEROL FUMARATE DIHYDRATE 2 PUFF: 160; 4.5 AEROSOL RESPIRATORY (INHALATION) at 20:16

## 2022-06-10 RX ADMIN — Medication: at 17:01

## 2022-06-10 RX ADMIN — ONDANSETRON 4 MG: 2 INJECTION INTRAMUSCULAR; INTRAVENOUS at 16:47

## 2022-06-10 RX ADMIN — FLUTICASONE PROPIONATE 1 SPRAY: 50 SPRAY, METERED NASAL at 08:00

## 2022-06-10 RX ADMIN — ISODIUM CHLORIDE 3 ML: 0.03 SOLUTION RESPIRATORY (INHALATION) at 19:33

## 2022-06-10 RX ADMIN — MORPHINE SULFATE 2 MG: 2 INJECTION, SOLUTION INTRAMUSCULAR; INTRAVENOUS at 13:57

## 2022-06-10 RX ADMIN — BUDESONIDE AND FORMOTEROL FUMARATE DIHYDRATE 2 PUFF: 160; 4.5 AEROSOL RESPIRATORY (INHALATION) at 07:55

## 2022-06-10 RX ADMIN — Medication: at 08:59

## 2022-06-10 RX ADMIN — MORPHINE SULFATE 2 MG: 2 INJECTION, SOLUTION INTRAMUSCULAR; INTRAVENOUS at 07:56

## 2022-06-10 RX ADMIN — FAMOTIDINE 20 MG: 10 INJECTION, SOLUTION INTRAVENOUS at 08:03

## 2022-06-10 RX ADMIN — DOCUSATE SODIUM 100 MG: 100 CAPSULE, LIQUID FILLED ORAL at 08:48

## 2022-06-10 RX ADMIN — MORPHINE SULFATE 4 MG: 4 INJECTION INTRAVENOUS at 20:11

## 2022-06-10 RX ADMIN — LEVALBUTEROL HYDROCHLORIDE 1.25 MG: 1.25 SOLUTION, CONCENTRATE RESPIRATORY (INHALATION) at 15:00

## 2022-06-10 RX ADMIN — SODIUM PHOSPHATE 1 ENEMA: 7; 19 ENEMA RECTAL at 08:55

## 2022-06-10 NOTE — PLAN OF CARE
Problem: CARDIOVASCULAR - ADULT  Goal: Maintains optimal cardiac output and hemodynamic stability  Description: INTERVENTIONS:  - Monitor I/O, vital signs and rhythm  - Monitor for S/S and trends of decreased cardiac output  - Administer and titrate ordered vasoactive medications to optimize hemodynamic stability  - Assess quality of pulses, skin color and temperature  - Assess for signs of decreased coronary artery perfusion  - Instruct patient to report change in severity of symptoms  Outcome: Progressing  Goal: Absence of cardiac dysrhythmias or at baseline rhythm  Description: INTERVENTIONS:  - Continuous cardiac monitoring, vital signs, obtain 12 lead EKG if ordered  - Administer antiarrhythmic and heart rate control medications as ordered  - Monitor electrolytes and administer replacement therapy as ordered  Outcome: Progressing     Problem: GASTROINTESTINAL - ADULT  Goal: Minimal or absence of nausea and/or vomiting  Description: INTERVENTIONS:  - Administer IV fluids if ordered to ensure adequate hydration  - Maintain NPO status until nausea and vomiting are resolved  - Nasogastric tube if ordered  - Administer ordered antiemetic medications as needed  - Provide nonpharmacologic comfort measures as appropriate  - Advance diet as tolerated, if ordered  - Consider nutrition services referral to assist patient with adequate nutrition and appropriate food choices  Outcome: Progressing  Goal: Maintains or returns to baseline bowel function  Description: INTERVENTIONS:  - Assess bowel function  - Encourage oral fluids to ensure adequate hydration  - Administer IV fluids if ordered to ensure adequate hydration  - Administer ordered medications as needed  - Encourage mobilization and activity  - Consider nutritional services referral to assist patient with adequate nutrition and appropriate food choices  Outcome: Progressing  Goal: Oral mucous membranes remain intact  Description: INTERVENTIONS  - Assess oral mucosa and hygiene practices  - Implement preventative oral hygiene regimen  - Implement oral medicated treatments as ordered  - Initiate Nutrition services referral as needed  Outcome: Progressing

## 2022-06-10 NOTE — CASE MANAGEMENT
Case Management Progress Note    Patient name Karla Madrid  Location 2 [facility] 68 204/2 [address] [facility] MRN [id number]  : 1970 Date 6/10/2022       LOS (days): 4  Geometric Mean LOS (GMLOS) (days):   Days to GMLOS:        OBJECTIVE:    Current admission status: Inpatient  Preferred Pharmacy:   [facility]/pharmacy #[id number]- [place] - [address], [address]  [place]  [address] [address], [address]  Phone: [phone] Fax: [phone]    Primary Care Provider: Som Frazier MD    Primary Insurance: [address] [facility]  Secondary Insurance:     PROGRESS NOTE:    SW following to assist with DCP  Pt is a long term resident at the [facility]  Plan is for pt to return to facility when discharged  Clinical update sent to facility to prepare for eventual return  Transportation request was submitted to [facility] on 22 to plan ahead for possible weekend discharge  SW will continue to follow to monitor progress and assist with transfer back when ready

## 2022-06-10 NOTE — PROGRESS NOTES
[facility] Internal Medicine Progress Note  Patient: Rudi Lynn 47 y o  male   MRN: [id number]  PCP: Morteza Easley MD  Unit/Bed#: [address] [place] [address] Encounter: [id number]  Date Of Visit: 06/10/22    Problem List:    Principal Problem:    SBO  Active Problems:    Atrial fibrillation with rapid ventricular response (HCC)    Chronic diastolic heart failure ([facility] )    Morbid obesity ([facility] )    Obesity hypoventilation syndrome/FILIPPO    COPD (chronic obstructive pulmonary disease) ([facility] )    Hypothyroidism    History of DVT (deep vein thrombosis)    Chronic respiratory failure with hypoxia ([facility] )    Hypertension    Diabetes mellitus ([facility] )    Depression      Assessment & Plan:    * SBO  Assessment & Plan  Patient presented to [place] ED on 6/4 for abdominal pain, nausea, vomiting for 1 day  CT showed SBO at the proximal jejunum with transition point at the left upper abdomen  History of cholecystectomy, hernia repair, gastric bypass  Had SBO 1 year ago, required surgery per patient  Patient transferred to [place] due to needing ICU support following any potential surgery due to multiple comorbidities  · NG tube clamp trial done on 06/09 and NG tube was discontinued  · Patient remains NPO  · Surgery consulted and appreciate their input  · Repeat CT scan with oral contrast could not be done due to weight limit  · Obstruction series shows persistent partial SBO on 06/09  · Started on bowel regimen after which patient reports small amount of BM    Atrial fibrillation with rapid ventricular response [facility])  Assessment & Plan  Patient is on metoprolol 100 mg p o  B i d, Pradaxa 150 b i d  in nursing home  · EKG AFib with RVR, rate 104 on 6/4  · Previously on IV metoprolol which was discontinued as heart rate was persistently elevated  A  · Received IV Cardizem IVP and started on Cardizem drip    Titrated up to 15 mg/hr with improvement in HR  · Continue heparin drip ACS protocol from [facility] while holding Pradaxa    Chronic diastolic heart failure [facility])  Assessment & Plan  Wt Readings from Last 3 Encounters:   06/10/22 (!) 234 kg (515 lb)   06/06/22 (!) 226 kg (498 lb 3 8 oz)   06/02/22 (!) 232 kg (512 lb)     Patient is Demadex 40 p o  B i d, spirolactone 50 p o  Daily from nursing home  2D echo May 2021 showed normal EF  · Holding Demadex and spironolactone due to NPO  · Discontinued IV Lasix 20 daily   · Continue IV hydration  · Daily weight and I&Os        Depression  Assessment & Plan  Continue Wellbutrin    Diabetes mellitus [facility])  Assessment & Plan  Lab Results   Component Value Date    HGBA1C 5 6 06/07/2022     · History of diabetes  · Not on any medications in nursing home    Hypertension  Assessment & Plan  On metoprolol at nursing home  · BPs stable on Cardizem drip    Chronic respiratory failure with hypoxia (HCC)  Assessment & Plan  On oxygen 2 L chronically  Likely due to OHS, COPD  Normal PA pressure on echo in 5/2021  On DuoNeb t i d, Symbicort in nursing home  · Continue 2 L oxygen during the day  · CPAP at HS on room air  History of DVT (deep vein thrombosis)  Assessment & Plan  Patient is on Pradaxa 150 b i d  In nursing  · Continue heparin drip ACS protocol  · Switch to Pradaxa once cleared by surgery    Hypothyroidism  Assessment & Plan  Continue Synthroid  · Recent TSH normal    COPD (chronic obstructive pulmonary disease) (HCC)  Assessment & Plan  Continue Symbicort  · Xopenex t i d  And p r n  Obesity hypoventilation syndrome/FILIPPO  Assessment & Plan  · Continue 2 L O2 during the day and CPAP QHS    Morbid obesity (HCC)  Assessment & Plan  Status post gastric bypass  · He is wheelchair-bound in nursing home  Body mass index is 71 83 kg/m²  VTE Pharmacologic Prophylaxis: VTE Score: 10 High Risk (Score >/= 5) - Pharmacological DVT Prophylaxis Ordered: heparin drip  Sequential Compression Devices Ordered  Patient Centered Rounds: I performed bedside rounds with nursing staff today    Discussions with Specialists or Other Care Team Provider: yes - surgery    Education and Discussions with Family / Patient: Patient declined call to   Time Spent for Care: 35 min  More than 50% of total time spent on counseling and coordination of care as described above  Current Length of Stay: 4 day(s)  Current Patient Status: Inpatient   Certification Statement: The patient will continue to require additional inpatient hospital stay due to SBO still NPO, AFib with RVR on Cardizem drip  Discharge Plan: Anticipate discharge in >72 hrs to rehab facility  Code Status: Level 1 - Full Code    Subjective:     Patient reports a very small BM  No nausea or vomiting  Still with some abdominal pain    Objective:     Vitals:   Temp (24hrs), Av 9 °F (37 2 °C), Min:98 7 °F (37 1 °C), Max:99 3 °F (37 4 °C)    Temp:  [98 7 °F (37 1 °C)-99 3 °F (37 4 °C)] 98 7 °F (37 1 °C)  HR:  [] 96  Resp:  [18-20] 20  BP: (116-123)/(73-77) 123/73  SpO2:  [91 %-97 %] 95 %  Body mass index is 71 83 kg/m²  Input and Output Summary (last 24 hours): Intake/Output Summary (Last 24 hours) at 6/10/2022 1620  Last data filed at 2022 1801  Gross per 24 hour   Intake --   Output 380 ml   Net -380 ml       Physical Exam:   Physical Exam  Constitutional:       General: He is not in acute distress  Appearance: He is obese  He is not diaphoretic  HENT:      Head: Normocephalic and atraumatic  Eyes:      General: No scleral icterus  Cardiovascular:      Rate and Rhythm: Tachycardia present  Rhythm irregular  Pulmonary:      Effort: Pulmonary effort is normal  No respiratory distress  Breath sounds: No wheezing or rales  Comments: Decreased breath sounds bilaterally  Abdominal:      General: There is no distension  Palpations: Abdomen is soft  Tenderness: There is abdominal tenderness (Mild generalized)  Comments: Ventral hernia which is soft    Hypoactive bowel sounds   Neurological: Mental Status: He is alert and oriented to person, place, and time  Additional Data:     Labs:  Results from last 7 days   Lab Units 22  0603 22  0520   WBC Thousand/uL 7 09 6 21   HEMOGLOBIN g/dL 12 3 12 6   HEMATOCRIT % 38 3 40 2   PLATELETS Thousands/uL 168 212   NEUTROS PCT %  --  70   LYMPHS PCT %  --  17   MONOS PCT %  --  9   EOS PCT %  --  2     Results from last 7 days   Lab Units 06/10/22  0544 22  0520 22  0558   SODIUM mmol/L 135*   < > 140   POTASSIUM mmol/L 4 2   < > 4 0   CHLORIDE mmol/L 95*   < > 94*   CO2 mmol/L 35*   < > 39*   BUN mg/dL 7   < > 15   CREATININE mg/dL 0 83   < > 0 85   ANION GAP mmol/L 5   < > 7   CALCIUM mg/dL 8 7   < > 9 8   ALBUMIN g/dL  --   --  3 7   TOTAL BILIRUBIN mg/dL  --   --  0 54   ALK PHOS U/L  --   --  74   ALT U/L  --   --  18   AST U/L  --   --  17   GLUCOSE RANDOM mg/dL 138   < > 129    < > = values in this interval not displayed       Results from last 7 days   Lab Units 22  0654   INR  1 13         Results from last 7 days   Lab Units 22  0620   HEMOGLOBIN A1C % 5 6     Results from last 7 days   Lab Units 22  0056   LACTIC ACID mmol/L 1 3       Lines/Drains:  Invasive Devices  Report    Peripheral Intravenous Line  Duration           Peripheral IV 22 Distal;Right;Ventral (anterior) Forearm 4 days    Long-Dwell Peripheral IV (Midline) 22 Right Brachial 3 days    Peripheral IV 22 Left Antecubital 3 days                  Telemetry:  Telemetry Orders (From admission, onward)             48 Hour Telemetry Monitoring  Continuous x 48 hours           References:    Telemetry Guidelines   Question:  Reason for 48 Hour Telemetry  Answer:  Arrhythmias Requiring Medical Therapy (eg  SVT, Vtach/fib, Bradycardia, Uncontrolled A-fib)                 Telemetry Reviewed: AFib with heart rate 100-110s  Indication for Continued Telemetry Use: Arrthymias requiring medical therapy             Imaging: Reviewed radiology reports from this admission including: xray(s)    Recent Cultures (last 7 days):         Last 24 Hours Medication List:   Current Facility-Administered Medications   Medication Dose Route Frequency Provider Last Rate    acetaminophen  325 mg Rectal Q4H PRN Cuitrinity Guerraribobby, CRNP      ammonium lactate   Topical BID Cuitrinity Guerrarik, CRNP      barium  900 mL Oral Once in imaging Ken De Los Santos PA-C      budesonide-formoterol  2 puff Inhalation BID CuiRADHA Sanchez      buPROPion  450 mg Oral Daily Cuitrinity Jim, BANDARNP      dextrose 5 % and sodium chloride 0 45 % with KCl 20 mEq/L  100 mL/hr Intravenous Continuous Huong Revankar,  mL/hr (06/10/22 1200)    diltiazem  15 mg/hr Intravenous Continuous Maria Isabel VecJIMENA duarte 15 mg/hr (06/10/22 0339)    docusate sodium  100 mg Oral BID Edmundo Figueroa PA-C      famotidine  20 mg Intravenous Q12H [address] Cuitrinity Guerrarik, BANDARNP      fluticasone  1 spray Each Nare Daily Amber Jim, BANDARNP      heparin (porcine)  3-35 Units/kg/hr (Order-Specific) Intravenous Titrated Huong Revankar, DO 11 1 Units/kg/hr (06/10/22 0131)    heparin (porcine)  2,000 Units Intravenous Q1H PRN Cuitrinity Guerrarik, CRNP      heparin (porcine)  4,000 Units Intravenous Q1H PRN Cuitrinity Guerraribobby, CRNP      levalbuterol  0 63 mg Nebulization Q4H PRN Cuilayan Maririk, CRNP      levalbuterol  1 25 mg Nebulization TID Cuitrinity Guerraribobby, CRNP      And    sodium chloride  3 mL Nebulization TID Cuitrinity Guerrarik, CRNP      morphine injection  4 mg Intravenous Q4H PRN Cuilayan Yurik, CRNP      morphine injection  2 mg Intravenous Q4H PRN Cuilayan Maririk, CRNP      mupirocin   Nasal Q12H [address] Huong Revankar, DO      ondansetron  4 mg Intravenous Q4H PRN Cuilayan Maririk, CRNP      phenol  1 spray Mouth/Throat Q2H PRN Edmundo Henry, PA-C      promethazine  25 mg Intravenous Q6H PRN RADHA Hernandez          Today, Patient Was Seen By: Jeanine Villavicencio DO    ** Please Note: "This note has been constructed using a voice recognition system  Therefore there may be syntax, spelling, and/or grammatical errors   Please call if you have any questions  "**

## 2022-06-10 NOTE — PLAN OF CARE
Problem: RESPIRATORY - ADULT  Goal: Achieves optimal ventilation and oxygenation  Description: INTERVENTIONS:  - Assess for changes in respiratory status  - Assess for changes in mentation and behavior  - Position to facilitate oxygenation and minimize respiratory effort  - Oxygen administered by appropriate delivery if ordered  - Initiate smoking cessation education as indicated  - Encourage broncho-pulmonary hygiene including cough, deep breathe, Incentive Spirometry  - Assess the need for suctioning and aspirate as needed  - Assess and instruct to report SOB or any respiratory difficulty  - Respiratory Therapy support as indicated  Outcome: Progressing     Problem: CARDIOVASCULAR - ADULT  Goal: Maintains optimal cardiac output and hemodynamic stability  Description: INTERVENTIONS:  - Monitor I/O, vital signs and rhythm  - Monitor for S/S and trends of decreased cardiac output  - Administer and titrate ordered vasoactive medications to optimize hemodynamic stability  - Assess quality of pulses, skin color and temperature  - Assess for signs of decreased coronary artery perfusion  - Instruct patient to report change in severity of symptoms  Outcome: Progressing  Goal: Absence of cardiac dysrhythmias or at baseline rhythm  Description: INTERVENTIONS:  - Continuous cardiac monitoring, vital signs, obtain 12 lead EKG if ordered  - Administer antiarrhythmic and heart rate control medications as ordered  - Monitor electrolytes and administer replacement therapy as ordered  Outcome: Progressing     Problem: GASTROINTESTINAL - ADULT  Goal: Minimal or absence of nausea and/or vomiting  Description: INTERVENTIONS:  - Administer IV fluids if ordered to ensure adequate hydration  - Maintain NPO status until nausea and vomiting are resolved  - Nasogastric tube if ordered  - Administer ordered antiemetic medications as needed  - Provide nonpharmacologic comfort measures as appropriate  - Advance diet as tolerated, if ordered  - Consider nutrition services referral to assist patient with adequate nutrition and appropriate food choices  Outcome: Progressing  Goal: Maintains or returns to baseline bowel function  Description: INTERVENTIONS:  - Assess bowel function  - Encourage oral fluids to ensure adequate hydration  - Administer IV fluids if ordered to ensure adequate hydration  - Administer ordered medications as needed  - Encourage mobilization and activity  - Consider nutritional services referral to assist patient with adequate nutrition and appropriate food choices  Outcome: Progressing  Goal: Oral mucous membranes remain intact  Description: INTERVENTIONS  - Assess oral mucosa and hygiene practices  - Implement preventative oral hygiene regimen  - Implement oral medicated treatments as ordered  - Initiate Nutrition services referral as needed  Outcome: Progressing     Problem: MOBILITY - ADULT  Goal: Maintain or return to baseline ADL function  Description: INTERVENTIONS:  -  Assess patient's ability to carry out ADLs; assess patient's baseline for ADL function and identify physical deficits which impact ability to perform ADLs (bathing, care of mouth/teeth, toileting, grooming, dressing, etc )  - Assess/evaluate cause of self-care deficits   - Assess range of motion  - Assess patient's mobility; develop plan if impaired  - Assess patient's need for assistive devices and provide as appropriate  - Encourage maximum independence but intervene and supervise when necessary  - Involve family in performance of ADLs  - Assess for home care needs following discharge   - Consider OT consult to assist with ADL evaluation and planning for discharge  - Provide patient education as appropriate  Outcome: Progressing  Goal: Maintains/Returns to pre admission functional level  Description: INTERVENTIONS:  - Perform BMAT or MOVE assessment daily    - Set and communicate daily mobility goal to care team and patient/family/caregiver     - Collaborate with rehabilitation services on mobility goals if consulted  - Perform Range of Motion 3 times a day  - Reposition patient every 2 hours    - Dangle patient 3 times a day  - Stand patient 3 times a day  - Ambulate patient 3 times a day  - Out of bed to chair 3 times a day   - Out of bed for meals 3 times a day  - Out of bed for toileting  - Record patient progress and toleration of activity level   Outcome: Progressing

## 2022-06-10 NOTE — PROGRESS NOTES
Progress Note - General Surgery   Rudi Lynn 47 y o  male MRN: [id number]  Unit/Bed#: 2 [address] Encounter: [id number]    Assessment:  · SBO: NGT  discontinued yesterday after tolerating clamp trial  Denies BM  Reports passing gas yesterday afternoon  Denies any nausea or vomiting  · Reducible ventral hernia:   · Morbid obesity: BMI 71 95    Plan:  Continue NPO  Will re-evaluate mid day possible transition to clears  NG tube discontinued on 06/09 after tolerating clamp trial   Bowel regimen: Miralax, Colace, Fleets enema  Continue IVF  Medical management per SLIM  Subjective/Objective   Chief Complaint:"I am okay "     Subjective:Tip was seen and examined bedside  Patient reports no acute changes through the night  Patient reports mild abdominal discomfort  Patient reports passing flatus yesterday afternoon but denies flatus over night  Denies nausea or vomiting  Objective:     Blood pressure 121/74, pulse 98, temperature 99 3 °F (37 4 °C), temperature source Oral, resp  rate 20, weight (!) 234 kg (515 lb), SpO2 97 %  ,Body mass index is 71 83 kg/m²  Intake/Output Summary (Last 24 hours) at 6/10/2022 0842  Last data filed at 6/9/2022 1801  Gross per 24 hour   Intake --   Output 580 ml   Net -580 ml       Invasive Devices  Report    Peripheral Intravenous Line  Duration           Long-Dwell Peripheral IV (Midline) 06/06/22 Right Brachial 3 days    Peripheral IV 06/06/22 Distal;Right;Ventral (anterior) Forearm 3 days    Peripheral IV 06/06/22 Left Antecubital 3 days                Physical Exam: /74   Pulse 98   Temp 99 3 °F (37 4 °C) (Oral)   Resp 20   Wt (!) 234 kg (515 lb)   SpO2 97%   BMI 71 83 kg/m²   General appearance: alert and oriented, in no acute distress  Lungs: Normal effort, no respiratory distress  Heart: Regular rate    Abdomen: Obese abdomen, hypoactive bowel sounds, soft reducible ventral hernia    Lab, Imaging and other studies:  I have personally reviewed pertinent lab results    , CBC: No results found for: WBC, HGB, HCT, MCV, PLT, ADJUSTEDWBC, MCH, MCHC, RDW, MPV, NRBC, CMP:   Lab Results   Component Value Date    SODIUM 135 (L) 06/10/2022    K 4 2 06/10/2022    CL 95 (L) 06/10/2022    CO2 35 (H) 06/10/2022    BUN 7 06/10/2022    CREATININE 0 83 06/10/2022    CALCIUM 8 7 06/10/2022    EGFR 101 06/10/2022     VTE Pharmacologic Prophylaxis: Heparin  VTE Mechanical Prophylaxis: sequential compression device

## 2022-06-11 LAB
ANION GAP SERPL CALCULATED.3IONS-SCNC: 8 MMOL/L (ref 4–13)
APTT PPP: 106 SECONDS (ref 23–37)
APTT PPP: 46 SECONDS (ref 23–37)
APTT PPP: 67 SECONDS (ref 23–37)
BUN SERPL-MCNC: 7 MG/DL (ref 5–25)
CALCIUM SERPL-MCNC: 9.1 MG/DL (ref 8.3–10.1)
CHLORIDE SERPL-SCNC: 98 MMOL/L (ref 100–108)
CO2 SERPL-SCNC: 28 MMOL/L (ref 21–32)
CREAT SERPL-MCNC: 0.7 MG/DL (ref 0.6–1.3)
ERYTHROCYTE [DISTWIDTH] IN BLOOD BY AUTOMATED COUNT: 15.6 % (ref 11.6–15.1)
GFR SERPL CREATININE-BSD FRML MDRD: 109 ML/MIN/1.73SQ M
GLUCOSE SERPL-MCNC: 128 MG/DL (ref 65–140)
HCT VFR BLD AUTO: 41.4 % (ref 36.5–49.3)
HGB BLD-MCNC: 12.7 G/DL (ref 12–17)
MCH RBC QN AUTO: 30 PG (ref 26.8–34.3)
MCHC RBC AUTO-ENTMCNC: 30.7 G/DL (ref 31.4–37.4)
MCV RBC AUTO: 98 FL (ref 82–98)
PLATELET # BLD AUTO: 151 THOUSANDS/UL (ref 149–390)
PMV BLD AUTO: 10.5 FL (ref 8.9–12.7)
POTASSIUM SERPL-SCNC: 4.7 MMOL/L (ref 3.5–5.3)
RBC # BLD AUTO: 4.24 MILLION/UL (ref 3.88–5.62)
SODIUM SERPL-SCNC: 134 MMOL/L (ref 136–145)
WBC # BLD AUTO: 5.72 THOUSAND/UL (ref 4.31–10.16)

## 2022-06-11 PROCEDURE — 99024 POSTOP FOLLOW-UP VISIT: CPT | Performed by: SURGERY

## 2022-06-11 PROCEDURE — 85730 THROMBOPLASTIN TIME PARTIAL: CPT | Performed by: SPECIALIST

## 2022-06-11 PROCEDURE — 94760 N-INVAS EAR/PLS OXIMETRY 1: CPT

## 2022-06-11 PROCEDURE — 99232 SBSQ HOSP IP/OBS MODERATE 35: CPT | Performed by: INTERNAL MEDICINE

## 2022-06-11 PROCEDURE — 85730 THROMBOPLASTIN TIME PARTIAL: CPT | Performed by: INTERNAL MEDICINE

## 2022-06-11 PROCEDURE — 94660 CPAP INITIATION&MGMT: CPT

## 2022-06-11 PROCEDURE — 94640 AIRWAY INHALATION TREATMENT: CPT

## 2022-06-11 PROCEDURE — 85027 COMPLETE CBC AUTOMATED: CPT | Performed by: FAMILY MEDICINE

## 2022-06-11 PROCEDURE — 80048 BASIC METABOLIC PNL TOTAL CA: CPT | Performed by: FAMILY MEDICINE

## 2022-06-11 RX ORDER — LIDOCAINE HYDROCHLORIDE 20 MG/ML
1 JELLY TOPICAL ONCE
Status: COMPLETED | OUTPATIENT
Start: 2022-06-11 | End: 2022-06-11

## 2022-06-11 RX ADMIN — DILTIAZEM HYDROCHLORIDE 15 MG/HR: 5 INJECTION INTRAVENOUS at 21:12

## 2022-06-11 RX ADMIN — MORPHINE SULFATE 4 MG: 4 INJECTION INTRAVENOUS at 19:50

## 2022-06-11 RX ADMIN — DILTIAZEM HYDROCHLORIDE 15 MG/HR: 5 INJECTION INTRAVENOUS at 05:01

## 2022-06-11 RX ADMIN — HEPARIN SODIUM 2000 UNITS: 1000 INJECTION INTRAVENOUS; SUBCUTANEOUS at 21:19

## 2022-06-11 RX ADMIN — DEXTROSE, SODIUM CHLORIDE, AND POTASSIUM CHLORIDE 100 ML/HR: 5; .45; .15 INJECTION INTRAVENOUS at 18:14

## 2022-06-11 RX ADMIN — ISODIUM CHLORIDE 3 ML: 0.03 SOLUTION RESPIRATORY (INHALATION) at 14:32

## 2022-06-11 RX ADMIN — FLUTICASONE PROPIONATE 1 SPRAY: 50 SPRAY, METERED NASAL at 08:59

## 2022-06-11 RX ADMIN — LEVALBUTEROL HYDROCHLORIDE 1.25 MG: 1.25 SOLUTION, CONCENTRATE RESPIRATORY (INHALATION) at 19:29

## 2022-06-11 RX ADMIN — Medication: at 18:14

## 2022-06-11 RX ADMIN — LEVALBUTEROL HYDROCHLORIDE 1.25 MG: 1.25 SOLUTION, CONCENTRATE RESPIRATORY (INHALATION) at 14:32

## 2022-06-11 RX ADMIN — LEVALBUTEROL HYDROCHLORIDE 1.25 MG: 1.25 SOLUTION, CONCENTRATE RESPIRATORY (INHALATION) at 08:22

## 2022-06-11 RX ADMIN — ISODIUM CHLORIDE 3 ML: 0.03 SOLUTION RESPIRATORY (INHALATION) at 08:22

## 2022-06-11 RX ADMIN — ISODIUM CHLORIDE 3 ML: 0.03 SOLUTION RESPIRATORY (INHALATION) at 19:29

## 2022-06-11 RX ADMIN — LIDOCAINE HYDROCHLORIDE 1 APPLICATION: 20 JELLY TOPICAL at 12:20

## 2022-06-11 RX ADMIN — ONDANSETRON 4 MG: 2 INJECTION INTRAMUSCULAR; INTRAVENOUS at 16:30

## 2022-06-11 RX ADMIN — MORPHINE SULFATE 4 MG: 4 INJECTION INTRAVENOUS at 12:22

## 2022-06-11 RX ADMIN — Medication: at 09:00

## 2022-06-11 RX ADMIN — MORPHINE SULFATE 4 MG: 4 INJECTION INTRAVENOUS at 04:39

## 2022-06-11 RX ADMIN — MUPIROCIN 1 APPLICATION: 20 OINTMENT TOPICAL at 09:00

## 2022-06-11 RX ADMIN — PROMETHAZINE HYDROCHLORIDE 25 MG: 25 INJECTION INTRAMUSCULAR; INTRAVENOUS at 19:50

## 2022-06-11 RX ADMIN — DOCUSATE SODIUM 100 MG: 100 CAPSULE, LIQUID FILLED ORAL at 08:53

## 2022-06-11 RX ADMIN — MORPHINE SULFATE 2 MG: 2 INJECTION, SOLUTION INTRAMUSCULAR; INTRAVENOUS at 09:04

## 2022-06-11 RX ADMIN — FAMOTIDINE 20 MG: 10 INJECTION, SOLUTION INTRAVENOUS at 08:53

## 2022-06-11 RX ADMIN — BUPROPION 450 MG: 150 TABLET, EXTENDED RELEASE ORAL at 08:53

## 2022-06-11 RX ADMIN — FAMOTIDINE 20 MG: 10 INJECTION, SOLUTION INTRAVENOUS at 21:15

## 2022-06-11 RX ADMIN — MUPIROCIN 1 APPLICATION: 20 OINTMENT TOPICAL at 21:15

## 2022-06-11 RX ADMIN — BUDESONIDE AND FORMOTEROL FUMARATE DIHYDRATE 2 PUFF: 160; 4.5 AEROSOL RESPIRATORY (INHALATION) at 08:59

## 2022-06-11 RX ADMIN — HEPARIN SODIUM 11.1 UNITS/KG/HR: 10000 INJECTION, SOLUTION INTRAVENOUS at 04:56

## 2022-06-11 RX ADMIN — ONDANSETRON 4 MG: 2 INJECTION INTRAMUSCULAR; INTRAVENOUS at 08:53

## 2022-06-11 RX ADMIN — MORPHINE SULFATE 2 MG: 2 INJECTION, SOLUTION INTRAMUSCULAR; INTRAVENOUS at 16:30

## 2022-06-11 NOTE — PLAN OF CARE
Problem: RESPIRATORY - ADULT  Goal: Achieves optimal ventilation and oxygenation  Description: INTERVENTIONS:  - Assess for changes in respiratory status  - Assess for changes in mentation and behavior  - Position to facilitate oxygenation and minimize respiratory effort  - Oxygen administered by appropriate delivery if ordered  - Initiate smoking cessation education as indicated  - Encourage broncho-pulmonary hygiene including cough, deep breathe, Incentive Spirometry  - Assess the need for suctioning and aspirate as needed  - Assess and instruct to report SOB or any respiratory difficulty  - Respiratory Therapy support as indicated  Outcome: Progressing     Problem: CARDIOVASCULAR - ADULT  Goal: Maintains optimal cardiac output and hemodynamic stability  Description: INTERVENTIONS:  - Monitor I/O, vital signs and rhythm  - Monitor for S/S and trends of decreased cardiac output  - Administer and titrate ordered vasoactive medications to optimize hemodynamic stability  - Assess quality of pulses, skin color and temperature  - Assess for signs of decreased coronary artery perfusion  - Instruct patient to report change in severity of symptoms  Outcome: Progressing  Goal: Absence of cardiac dysrhythmias or at baseline rhythm  Description: INTERVENTIONS:  - Continuous cardiac monitoring, vital signs, obtain 12 lead EKG if ordered  - Administer antiarrhythmic and heart rate control medications as ordered  - Monitor electrolytes and administer replacement therapy as ordered  Outcome: Progressing     Problem: GASTROINTESTINAL - ADULT  Goal: Minimal or absence of nausea and/or vomiting  Description: INTERVENTIONS:  - Administer IV fluids if ordered to ensure adequate hydration  - Maintain NPO status until nausea and vomiting are resolved  - Nasogastric tube if ordered  - Administer ordered antiemetic medications as needed  - Provide nonpharmacologic comfort measures as appropriate  - Advance diet as tolerated, if ordered  - Consider nutrition services referral to assist patient with adequate nutrition and appropriate food choices  Outcome: Progressing  Goal: Maintains or returns to baseline bowel function  Description: INTERVENTIONS:  - Assess bowel function  - Encourage oral fluids to ensure adequate hydration  - Administer IV fluids if ordered to ensure adequate hydration  - Administer ordered medications as needed  - Encourage mobilization and activity  - Consider nutritional services referral to assist patient with adequate nutrition and appropriate food choices  Outcome: Progressing  Goal: Oral mucous membranes remain intact  Description: INTERVENTIONS  - Assess oral mucosa and hygiene practices  - Implement preventative oral hygiene regimen  - Implement oral medicated treatments as ordered  - Initiate Nutrition services referral as needed  Outcome: Progressing     Problem: MOBILITY - ADULT  Goal: Maintain or return to baseline ADL function  Description: INTERVENTIONS:  -  Assess patient's ability to carry out ADLs; assess patient's baseline for ADL function and identify physical deficits which impact ability to perform ADLs (bathing, care of mouth/teeth, toileting, grooming, dressing, etc )  - Assess/evaluate cause of self-care deficits   - Assess range of motion  - Assess patient's mobility; develop plan if impaired  - Assess patient's need for assistive devices and provide as appropriate  - Encourage maximum independence but intervene and supervise when necessary  - Involve family in performance of ADLs  - Assess for home care needs following discharge   - Consider OT consult to assist with ADL evaluation and planning for discharge  - Provide patient education as appropriate  Outcome: Progressing  Goal: Maintains/Returns to pre admission functional level  Description: INTERVENTIONS:  - Perform BMAT or MOVE assessment daily    - Set and communicate daily mobility goal to care team and patient/family/caregiver     - Collaborate with rehabilitation services on mobility goals if consulted  - Perform Range of Motion 2 times a day  - Reposition patient every 2 hours    - Dangle patient 2 times a day  - Stand patient 2 times a day  - Ambulate patient 2 times a day  - Out of bed to chair 2 times a day   - Out of bed for meals 2 times a day  - Out of bed for toileting  - Record patient progress and toleration of activity level   Outcome: Progressing

## 2022-06-11 NOTE — PROGRESS NOTES
Progress Note - General Surgery   Candelaria Porteous 47 y o  male MRN: [id number]  Unit/Bed#: [address] Encounter: [id number]    Assessment:  SBO: Hospital day 6  Patient reports 1 BM after enema yesterday  Patient reports nausea and abdominal distension through the night  Denies episode of emesis  Ventral hernia:   Morbid obesity: BMI 71 95    Plan:  Continue NPO  Consider TPN  NGT inserted at bedside  1L output at bedside  Patient will need possible transfer to [facility] secondary to body habitus and need for plastics  Continue IVF  Medical management per SLIM  Subjective/Objective   Chief Complaint:"I am okay  Patient reports increased nausea and dry heaves "     Subjective:Paitnet was seen and examined bedside  Patient reports mild abdominal discomfort  Patient reports passing flatus yesterday afternoon but denies flatus over night  Denies nausea or vomiting  Patient reports nausea and dry heaves  Objective:     Blood pressure 124/71, pulse 90, temperature 98 7 °F (37 1 °C), resp  rate 18, height 5' 11" (1 803 m), weight (!) 234 kg (515 lb), SpO2 98 %  ,Body mass index is 71 83 kg/m²  Intake/Output Summary (Last 24 hours) at 6/11/2022 0845  Last data filed at 6/10/2022 2100  Gross per 24 hour   Intake --   Output 375 ml   Net -375 ml       Invasive Devices  Report      Peripheral Intravenous Line  Duration             Long-Dwell Peripheral IV (Midline) 06/06/22 Right Brachial 4 days    Peripheral IV 06/06/22 Distal;Right;Ventral (anterior) Forearm 4 days    Peripheral IV 06/06/22 Left Antecubital 4 days                    Physical Exam: /71   Pulse 90   Temp 98 7 °F (37 1 °C)   Resp 18   Ht 5' 11" (1 803 m)   Wt (!) 234 kg (515 lb)   SpO2 98%   BMI 71 83 kg/m²   General appearance: alert and oriented, in no acute distress  Lungs:  Normal effort, no respiratory distress  Heart:  Regular rate    Abdomen:  Obese abdomen,  hypoactive bowel sounds, soft reducible ventral hernia    Lab, Imaging and other studies:  I have personally reviewed pertinent lab results    , CBC:   Lab Results   Component Value Date    WBC 5 72 06/11/2022    HGB 12 7 06/11/2022    HCT 41 4 06/11/2022    MCV 98 06/11/2022     06/11/2022    MCH 30 0 06/11/2022    MCHC 30 7 (L) 06/11/2022    RDW 15 6 (H) 06/11/2022    MPV 10 5 06/11/2022   , CMP:   Lab Results   Component Value Date    SODIUM 134 (L) 06/11/2022    K 4 7 06/11/2022    CL 98 (L) 06/11/2022    CO2 28 06/11/2022    BUN 7 06/11/2022    CREATININE 0 70 06/11/2022    CALCIUM 9 1 06/11/2022    EGFR 109 06/11/2022     VTE Pharmacologic Prophylaxis: Heparin  VTE Mechanical Prophylaxis: sequential compression device

## 2022-06-11 NOTE — PROGRESS NOTES
[address]  Progress Note - Shadia Polanco 1970, 47 y o  male MRN: [id number]  Unit/Bed#: [address] Encounter: [id number]  Primary Care Provider: Fito Hernandez MD   Date and time admitted to hospital: 6/6/2022  8:24 PM    * SBO  Assessment & Plan  52-year-old gentleman morbidly obese with history of bariatric surgery currently resides at [address] [place] [address] Drive presented to [facility] ED for abdominal pain and transferred here to [facility] for surgical evaluation  · Initially had NG tube which was clamped and discontinued  · Patient remains NPO and given recurrence of symptoms attempted to obtain imaging but patient cannot have CT scan here due to weight  · NG tube to be replaced by surgery today    Depression  Assessment & Plan  · Continue wellbutrin    History of DVT (deep vein thrombosis)  Assessment & Plan  · Prior to admission on pradaxa currently on heparin infusion while NPO    COPD (chronic obstructive pulmonary disease) ([facility] [address] )  Assessment & Plan  · No exacerbation continue symbicort    Chronic diastolic heart failure (HCC)  Assessment & Plan  Wt Readings from Last 3 Encounters:   06/10/22 (!) 234 kg (515 lb)   06/06/22 (!) 226 kg (498 lb 3 8 oz)   06/02/22 (!) 232 kg (512 lb)     · Prior to admission on torsemide and spironolactone  · No evidence of acute decompensation  · Hold on hold while NPO  · Continue IV fluid hydration but monitor volume status closely which may be difficult    Obesity hypoventilation syndrome (HCC)  Assessment & Plan  · Continue oxygen during the day and nocturnal CPAP    Super-super obese (HCC)  Assessment & Plan  · Status post bariatric surgery currently resides in nursing facility  · Body mass index is 71 83 kg/m²      Atrial fibrillation with rapid ventricular response (HCC)  Assessment & Plan  · Atrial fibrillation prior to admission on metoprolol 100 mg b i d and pradaxa  · While NPO remains on diltiazem and heparin infusions      VTE Pharmacologic Prophylaxis: VTE Score: 10 High Risk (Score >/= 5) - Pharmacological DVT Prophylaxis Ordered: Heparin Drip  Sequential Compression Devices Ordered  Patient Centered Rounds: I have performed bedside rounds with nursing staff today  Discussions with Specialists or Other Care Team Provider:  Surgery and case management    Education and Discussions with Family / Patient:  Significant other on telephone    Time Spent for Care: 25 mins  More than 50% of total time spent on counseling and coordination of care as described above  Current Length of Stay: 5 day(s)  Current Patient Status: Inpatient   Certification Statement: The patient will continue to require additional inpatient hospital stay due to SBO  Discharge Plan / Estimated Discharge Date: Anticipate discharge in > 72 hrs to rehab facility  Code Status: Level 1 - Full Code      Subjective:   Patient seen and examined  More abdominal distension and nausea today  Objective:   Vitals: Blood pressure 131/75, pulse 94, temperature 98 1 °F (36 7 °C), resp  rate 18, height 5' 11" (1 803 m), weight (!) 234 kg (515 lb), SpO2 96 %  Physical Exam  Vitals reviewed  Constitutional:       General: He is not in acute distress  Appearance: He is obese  HENT:      Head: Atraumatic  Cardiovascular:      Rate and Rhythm: Regular rhythm  Pulmonary:      Effort: Pulmonary effort is normal       Breath sounds: Decreased breath sounds present  No wheezing  Abdominal:      General: There is distension  Tenderness: There is abdominal tenderness  There is no rebound  Musculoskeletal:         General: No swelling or tenderness  Skin:     General: Skin is warm and dry  Neurological:      Mental Status: He is alert  Cranial Nerves: No cranial nerve deficit     Psychiatric:         Mood and Affect: Mood normal        Additional Data:   Labs:  Results from last 7 days   Lab Units 06/11/22  0720 06/09/22  0603 06/06/22  0520 06/05/22  0558   WBC Thousand/uL 5 72 7 09 6 21 6 90   HEMOGLOBIN g/dL 12 7 12 3 12 6 12 5   HEMATOCRIT % 41 4 38 3 40 2 39 2   MCV fL 98 94 96 95   PLATELETS Thousands/uL 151 168 212 228     Results from last 7 days   Lab Units 06/11/22  0720 06/10/22  0544 06/09/22  0603 06/08/22  0602 06/07/22 0620 06/06/22  2315 06/06/22  0520 06/05/22  0558   SODIUM mmol/L 134* 135* 135* 141 144  --  142 140   POTASSIUM mmol/L 4 7 4 2 3 8 3 9 3 8 3 6 3 9 4 0   CHLORIDE mmol/L 98* 95* 94* 97* 99*  --  93* 94*   CO2 mmol/L 28 35* 36* 37* 41*  --  38* 39*   ANION GAP mmol/L 8 5 5 7 4  --  11 7   BUN mg/dL 7 7 8 10 13  --  15 15   CREATININE mg/dL 0 70 0 83 0 82 0 92 0 78  --  0 83 0 85   CALCIUM mg/dL 9 1 8 7 9 1 9 3 9 3  --  9 9 9 8   ALBUMIN g/dL  --   --   --   --   --   --   --  3 7   TOTAL BILIRUBIN mg/dL  --   --   --   --   --   --   --  0 54   ALK PHOS U/L  --   --   --   --   --   --   --  74   ALT U/L  --   --   --   --   --   --   --  18   AST U/L  --   --   --   --   --   --   --  17   EGFR ml/min/1 73sq m 109 101 102 95 104  --  101 100   GLUCOSE RANDOM mg/dL 128 138 140 127 122  --  78 129     Results from last 7 days   Lab Units 06/09/22  0603 06/07/22  0620 06/06/22  2315 06/05/22  0558   MAGNESIUM mg/dL 2 2 2 5 2 4 2 2   PHOSPHORUS mg/dL 3 9  --   --  4 0         Results from last 7 days   Lab Units 06/08/22  2214 06/08/22  1913 06/08/22  1657   HS TNI 0HR ng/L  --   --  3   HS TNI 2HR ng/L  --  8  --    HS TNI 4HR ng/L 3  --   --                   Results from last 7 days   Lab Units 06/07/22  0620   HEMOGLOBIN A1C % 5 6         * I Have Reviewed All Lab Data Listed Above      Cultures:                   Lines/Drains:  Invasive Devices  Report    Peripheral Intravenous Line  Duration           Long-Dwell Peripheral IV (Midline) 06/06/22 Right Brachial 4 days    Peripheral IV 06/06/22 Distal;Right;Ventral (anterior) Forearm 4 days    Peripheral IV 06/06/22 Left Antecubital 4 days          Drain  Duration           NG/OG/Enteral Tube Right nare <1 day              Telemetry:   Telemetry Orders (From admission, onward)             48 Hour Telemetry Monitoring  Continuous x 48 hours        References:    Telemetry Guidelines   Question:  Reason for 48 Hour Telemetry  Answer:  Arrhythmias Requiring Medical Therapy (eg  SVT, Vtach/fib, Bradycardia, Uncontrolled A-fib)                  Imaging:  Imaging Reports Reviewed Today Include:   XR abdomen obstruction series    Result Date: 6/9/2022  Impression: Persistent partial small bowel obstruction  Workstation performed: [id number]     XR abdomen obstruction series    Result Date: 6/9/2022  Impression: Technically limited examination due to body habitus and underpenetration  Persistently dilated small bowel loops in the left hemiabdomen in this patient with known small bowel obstruction  Contrast has progressed to the right colon  No definite air in the rectum  Workstation performed: [id number]     XR abdomen obstruction series    Result Date: 6/6/2022  Impression: Enteric tube in expected location, tip beneath left hemidiaphragm   Workstation performed: [id number]       Scheduled Meds:  Current Facility-Administered Medications   Medication Dose Route Frequency Provider Last Rate    acetaminophen  325 mg Rectal Q4H PRN RADHA Hernandez      ammonium lactate   Topical BID RADHA Hernandez      barium  900 mL Oral Once in imaging Rose Marie Hemphill PA-C      budesonide-formoterol  2 puff Inhalation BID RADHA Hernandez      buPROPion  450 mg Oral Daily RADHA Hernandez      dextrose 5 % and sodium chloride 0 45 % with KCl 20 mEq/L  100 mL/hr Intravenous Continuous Huong Revankar,  mL/hr (06/11/22 0755)    diltiazem  15 mg/hr Intravenous Continuous Maria Isabel JIMENA Jones 15 mg/hr (06/11/22 0501)    docusate sodium  100 mg Oral BID Raeann Silva PA-C      famotidine  20 mg Intravenous Q12H [facility] RADHA Hernandez      fluticasone  1 spray Each Nare Daily RADHA Viera heparin (porcine)  3-35 Units/kg/hr (Order-Specific) Intravenous Titrated Huong Leo DO 9 1 Units/kg/hr (06/11/22 0854)    heparin (porcine)  2,000 Units Intravenous Q1H PRN Cuiyin Yurik, CRNP      heparin (porcine)  4,000 Units Intravenous Q1H PRN Cuiyin Yurik, CRNP      levalbuterol  0 63 mg Nebulization Q4H PRN Cuiyin Yurik, CRNP      levalbuterol  1 25 mg Nebulization TID Cuiyin Yurik, CRNP      And    sodium chloride  3 mL Nebulization TID Cuiyin Yurik, CRNP      morphine injection  4 mg Intravenous Q4H PRN Cuiyin Yurik, CRNP      morphine injection  2 mg Intravenous Q4H PRN Cuiyin Yurik, CRNP      mupirocin   Nasal Q12H [facility] Huong Leo DO      ondansetron  4 mg Intravenous Q4H PRN Cuiyin Yurik, CRNP      phenol  1 spray Mouth/Throat Q2H PRN Sharri Thomas PA-C      promethazine  25 mg Intravenous Q6H PRN Cuiyin Yurik, CRNP         Today, Patient Was Seen By: Jane Gordon DO    ** Please Note: Dictation voice to text software may have been used in the creation of this document   **

## 2022-06-12 ENCOUNTER — APPOINTMENT (INPATIENT)
Dept: RADIOLOGY | Facility: HOSPITAL | Age: 52
DRG: 224 | End: 2022-06-12
Payer: COMMERCIAL

## 2022-06-12 LAB
ALBUMIN SERPL BCP-MCNC: 2.7 G/DL (ref 3.5–5)
ALP SERPL-CCNC: 96 U/L (ref 46–116)
ALT SERPL W P-5'-P-CCNC: 57 U/L (ref 12–78)
ANION GAP SERPL CALCULATED.3IONS-SCNC: 4 MMOL/L (ref 4–13)
APTT PPP: 27 SECONDS (ref 23–37)
APTT PPP: 40 SECONDS (ref 23–37)
APTT PPP: 69 SECONDS (ref 23–37)
AST SERPL W P-5'-P-CCNC: 38 U/L (ref 5–45)
BILIRUB SERPL-MCNC: 0.7 MG/DL (ref 0.2–1)
BUN SERPL-MCNC: 6 MG/DL (ref 5–25)
CALCIUM ALBUM COR SERPL-MCNC: 9.8 MG/DL (ref 8.3–10.1)
CALCIUM SERPL-MCNC: 8.8 MG/DL (ref 8.3–10.1)
CHLORIDE SERPL-SCNC: 96 MMOL/L (ref 100–108)
CO2 SERPL-SCNC: 35 MMOL/L (ref 21–32)
CREAT SERPL-MCNC: 0.76 MG/DL (ref 0.6–1.3)
ERYTHROCYTE [DISTWIDTH] IN BLOOD BY AUTOMATED COUNT: 15.3 % (ref 11.6–15.1)
GFR SERPL CREATININE-BSD FRML MDRD: 105 ML/MIN/1.73SQ M
GLUCOSE SERPL-MCNC: 135 MG/DL (ref 65–140)
HCT VFR BLD AUTO: 35.6 % (ref 36.5–49.3)
HGB BLD-MCNC: 11.4 G/DL (ref 12–17)
MAGNESIUM SERPL-MCNC: 2 MG/DL (ref 1.6–2.6)
MCH RBC QN AUTO: 30.5 PG (ref 26.8–34.3)
MCHC RBC AUTO-ENTMCNC: 32 G/DL (ref 31.4–37.4)
MCV RBC AUTO: 95 FL (ref 82–98)
PHOSPHATE SERPL-MCNC: 4 MG/DL (ref 2.7–4.5)
PLATELET # BLD AUTO: 145 THOUSANDS/UL (ref 149–390)
PMV BLD AUTO: 10.2 FL (ref 8.9–12.7)
POTASSIUM SERPL-SCNC: 4.6 MMOL/L (ref 3.5–5.3)
PROT SERPL-MCNC: 6.8 G/DL (ref 6.4–8.2)
RBC # BLD AUTO: 3.74 MILLION/UL (ref 3.88–5.62)
SODIUM SERPL-SCNC: 135 MMOL/L (ref 136–145)
WBC # BLD AUTO: 5.13 THOUSAND/UL (ref 4.31–10.16)

## 2022-06-12 PROCEDURE — 84100 ASSAY OF PHOSPHORUS: CPT | Performed by: PHYSICIAN ASSISTANT

## 2022-06-12 PROCEDURE — 74018 RADEX ABDOMEN 1 VIEW: CPT

## 2022-06-12 PROCEDURE — 85730 THROMBOPLASTIN TIME PARTIAL: CPT | Performed by: FAMILY MEDICINE

## 2022-06-12 PROCEDURE — 94760 N-INVAS EAR/PLS OXIMETRY 1: CPT

## 2022-06-12 PROCEDURE — 83735 ASSAY OF MAGNESIUM: CPT | Performed by: PHYSICIAN ASSISTANT

## 2022-06-12 PROCEDURE — 85027 COMPLETE CBC AUTOMATED: CPT | Performed by: PHYSICIAN ASSISTANT

## 2022-06-12 PROCEDURE — 80053 COMPREHEN METABOLIC PANEL: CPT | Performed by: PHYSICIAN ASSISTANT

## 2022-06-12 PROCEDURE — 94640 AIRWAY INHALATION TREATMENT: CPT

## 2022-06-12 PROCEDURE — 85730 THROMBOPLASTIN TIME PARTIAL: CPT | Performed by: INTERNAL MEDICINE

## 2022-06-12 PROCEDURE — 99232 SBSQ HOSP IP/OBS MODERATE 35: CPT | Performed by: FAMILY MEDICINE

## 2022-06-12 PROCEDURE — 99024 POSTOP FOLLOW-UP VISIT: CPT | Performed by: SURGERY

## 2022-06-12 PROCEDURE — 94660 CPAP INITIATION&MGMT: CPT

## 2022-06-12 RX ORDER — DEXTROSE, SODIUM CHLORIDE, AND POTASSIUM CHLORIDE 5; .9; .15 G/100ML; G/100ML; G/100ML
125 INJECTION INTRAVENOUS CONTINUOUS
Status: DISCONTINUED | OUTPATIENT
Start: 2022-06-12 | End: 2022-06-13

## 2022-06-12 RX ADMIN — BUDESONIDE AND FORMOTEROL FUMARATE DIHYDRATE 2 PUFF: 160; 4.5 AEROSOL RESPIRATORY (INHALATION) at 21:05

## 2022-06-12 RX ADMIN — Medication: at 18:11

## 2022-06-12 RX ADMIN — Medication: at 08:22

## 2022-06-12 RX ADMIN — MUPIROCIN 1 APPLICATION: 20 OINTMENT TOPICAL at 21:05

## 2022-06-12 RX ADMIN — LEVALBUTEROL HYDROCHLORIDE 1.25 MG: 1.25 SOLUTION, CONCENTRATE RESPIRATORY (INHALATION) at 20:13

## 2022-06-12 RX ADMIN — ISODIUM CHLORIDE 3 ML: 0.03 SOLUTION RESPIRATORY (INHALATION) at 13:13

## 2022-06-12 RX ADMIN — MORPHINE SULFATE 4 MG: 4 INJECTION INTRAVENOUS at 13:56

## 2022-06-12 RX ADMIN — FAMOTIDINE 20 MG: 10 INJECTION, SOLUTION INTRAVENOUS at 21:06

## 2022-06-12 RX ADMIN — DEXTROSE, SODIUM CHLORIDE, AND POTASSIUM CHLORIDE 100 ML/HR: 5; .45; .15 INJECTION INTRAVENOUS at 05:20

## 2022-06-12 RX ADMIN — DEXTROSE, SODIUM CHLORIDE, AND POTASSIUM CHLORIDE 125 ML/HR: 5; .9; .15 INJECTION INTRAVENOUS at 14:22

## 2022-06-12 RX ADMIN — HEPARIN SODIUM 19.1 UNITS/KG/HR: 10000 INJECTION, SOLUTION INTRAVENOUS at 23:56

## 2022-06-12 RX ADMIN — MORPHINE SULFATE 2 MG: 2 INJECTION, SOLUTION INTRAMUSCULAR; INTRAVENOUS at 20:01

## 2022-06-12 RX ADMIN — LEVALBUTEROL HYDROCHLORIDE 1.25 MG: 1.25 SOLUTION, CONCENTRATE RESPIRATORY (INHALATION) at 13:13

## 2022-06-12 RX ADMIN — BUDESONIDE AND FORMOTEROL FUMARATE DIHYDRATE 2 PUFF: 160; 4.5 AEROSOL RESPIRATORY (INHALATION) at 08:18

## 2022-06-12 RX ADMIN — LEVALBUTEROL HYDROCHLORIDE 1.25 MG: 1.25 SOLUTION, CONCENTRATE RESPIRATORY (INHALATION) at 07:22

## 2022-06-12 RX ADMIN — DILTIAZEM HYDROCHLORIDE 15 MG/HR: 5 INJECTION INTRAVENOUS at 14:08

## 2022-06-12 RX ADMIN — HEPARIN SODIUM 4000 UNITS: 1000 INJECTION INTRAVENOUS; SUBCUTANEOUS at 04:10

## 2022-06-12 RX ADMIN — FAMOTIDINE 20 MG: 10 INJECTION, SOLUTION INTRAVENOUS at 08:17

## 2022-06-12 RX ADMIN — ISODIUM CHLORIDE 3 ML: 0.03 SOLUTION RESPIRATORY (INHALATION) at 20:13

## 2022-06-12 RX ADMIN — MORPHINE SULFATE 4 MG: 4 INJECTION INTRAVENOUS at 08:39

## 2022-06-12 RX ADMIN — HEPARIN SODIUM 15.1 UNITS/KG/HR: 10000 INJECTION, SOLUTION INTRAVENOUS at 10:16

## 2022-06-12 RX ADMIN — IOHEXOL 100 ML: 240 INJECTION, SOLUTION INTRATHECAL; INTRAVASCULAR; INTRAVENOUS; ORAL at 09:15

## 2022-06-12 RX ADMIN — DILTIAZEM HYDROCHLORIDE 15 MG/HR: 5 INJECTION INTRAVENOUS at 21:05

## 2022-06-12 RX ADMIN — MORPHINE SULFATE 2 MG: 2 INJECTION, SOLUTION INTRAMUSCULAR; INTRAVENOUS at 05:35

## 2022-06-12 RX ADMIN — MUPIROCIN 1 APPLICATION: 20 OINTMENT TOPICAL at 08:19

## 2022-06-12 RX ADMIN — DILTIAZEM HYDROCHLORIDE 15 MG/HR: 5 INJECTION INTRAVENOUS at 05:20

## 2022-06-12 RX ADMIN — FLUTICASONE PROPIONATE 1 SPRAY: 50 SPRAY, METERED NASAL at 08:17

## 2022-06-12 RX ADMIN — BUPROPION 450 MG: 150 TABLET, EXTENDED RELEASE ORAL at 08:16

## 2022-06-12 RX ADMIN — ISODIUM CHLORIDE 3 ML: 0.03 SOLUTION RESPIRATORY (INHALATION) at 07:22

## 2022-06-12 RX ADMIN — DEXTROSE, SODIUM CHLORIDE, AND POTASSIUM CHLORIDE 125 ML/HR: 5; .9; .15 INJECTION INTRAVENOUS at 22:19

## 2022-06-12 RX ADMIN — HEPARIN SODIUM 4000 UNITS: 1000 INJECTION INTRAVENOUS; SUBCUTANEOUS at 11:23

## 2022-06-12 NOTE — ASSESSMENT & PLAN NOTE
Wt Readings from Last 3 Encounters:   06/12/22 (!) 234 kg (516 lb 1 6 oz)   06/06/22 (!) 226 kg (498 lb 3 8 oz)   06/02/22 (!) 232 kg (512 lb)     · Prior to admission on torsemide and spironolactone    · No evidence of acute decompensation  · Hold on hold while NPO  · Continue IV fluid hydration but monitor volume status closely which may be difficult

## 2022-06-12 NOTE — ASSESSMENT & PLAN NOTE
71-year-old gentleman morbidly obese with history of bariatric surgery currently resides at [address] presented to [facility] ED for abdominal pain and transferred here to [facility] for surgical evaluation  · Initially had NG tube which was clamped and discontinued  · Patient remains NPO and given recurrence of symptoms attempted to obtain imaging but patient cannot have CT scan here due to weight  · Surgery following, may need possible transfer to [facility] secondary to body habitus and need for plastics evaluation

## 2022-06-12 NOTE — PLAN OF CARE
Problem: RESPIRATORY - ADULT  Goal: Achieves optimal ventilation and oxygenation  Description: INTERVENTIONS:  - Assess for changes in respiratory status  - Assess for changes in mentation and behavior  - Position to facilitate oxygenation and minimize respiratory effort  - Oxygen administered by appropriate delivery if ordered  - Initiate smoking cessation education as indicated  - Encourage broncho-pulmonary hygiene including cough, deep breathe, Incentive Spirometry  - Assess the need for suctioning and aspirate as needed  - Assess and instruct to report SOB or any respiratory difficulty  - Respiratory Therapy support as indicated  Outcome: Progressing     Problem: MOBILITY - ADULT  Goal: Maintain or return to baseline ADL function  Description: INTERVENTIONS:  -  Assess patient's ability to carry out ADLs; assess patient's baseline for ADL function and identify physical deficits which impact ability to perform ADLs (bathing, care of mouth/teeth, toileting, grooming, dressing, etc )  - Assess/evaluate cause of self-care deficits   - Assess range of motion  - Assess patient's mobility; develop plan if impaired  - Assess patient's need for assistive devices and provide as appropriate  - Encourage maximum independence but intervene and supervise when necessary  - Involve family in performance of ADLs  - Assess for home care needs following discharge   - Consider OT consult to assist with ADL evaluation and planning for discharge  - Provide patient education as appropriate  Outcome: Progressing  Goal: Maintains/Returns to pre admission functional level  Description: INTERVENTIONS:  - Perform BMAT or MOVE assessment daily    - Set and communicate daily mobility goal to care team and patient/family/caregiver  - Collaborate with rehabilitation services on mobility goals if consulted  - Perform Range of Motion   times a day  - Reposition patient every   hours    - Dangle patient   times a day  - Stand patient   times a day  - Ambulate patient   times a day  - Out of bed to chair   times a day   - Out of bed for meals   times a day  - Out of bed for toileting  - Record patient progress and toleration of activity level   Outcome: Progressing     Problem: Potential for Falls  Goal: Patient will remain free of falls  Description: INTERVENTIONS:  - Educate patient/family on patient safety including physical limitations  - Instruct patient to call for assistance with activity   - Consult OT/PT to assist with strengthening/mobility   - Keep Call bell within reach  - Keep bed low and locked with side rails adjusted as appropriate  - Keep care items and personal belongings within reach  - Initiate and maintain comfort rounds  - Make Fall Risk Sign visible to staff  - Offer Toileting every   Hours, in advance of need  - Initiate/Maintain  alarm  - Obtain necessary fall risk management equipment:     - Apply yellow socks and bracelet for high fall risk patients  - Consider moving patient to room near nurses station  Outcome: Progressing     Problem: Prexisting or High Potential for Compromised Skin Integrity  Goal: Skin integrity is maintained or improved  Description: INTERVENTIONS:  - Identify patients at risk for skin breakdown  - Assess and monitor skin integrity  - Assess and monitor nutrition and hydration status  - Monitor labs   - Assess for incontinence   - Turn and reposition patient  - Assist with mobility/ambulation  - Relieve pressure over bony prominences  - Avoid friction and shearing  - Provide appropriate hygiene as needed including keeping skin clean and dry  - Evaluate need for skin moisturizer/barrier cream  - Collaborate with interdisciplinary team   - Patient/family teaching  - Consider wound care consult   Outcome: Progressing     Problem: CARDIOVASCULAR - ADULT  Goal: Maintains optimal cardiac output and hemodynamic stability  Description: INTERVENTIONS:  - Monitor I/O, vital signs and rhythm  - Monitor for S/S and trends of decreased cardiac output  - Administer and titrate ordered vasoactive medications to optimize hemodynamic stability  - Assess quality of pulses, skin color and temperature  - Assess for signs of decreased coronary artery perfusion  - Instruct patient to report change in severity of symptoms  Outcome: Progressing  Goal: Absence of cardiac dysrhythmias or at baseline rhythm  Description: INTERVENTIONS:  - Continuous cardiac monitoring, vital signs, obtain 12 lead EKG if ordered  - Administer antiarrhythmic and heart rate control medications as ordered  - Monitor electrolytes and administer replacement therapy as ordered  Outcome: Progressing     Problem: GASTROINTESTINAL - ADULT  Goal: Minimal or absence of nausea and/or vomiting  Description: INTERVENTIONS:  - Administer IV fluids if ordered to ensure adequate hydration  - Maintain NPO status until nausea and vomiting are resolved  - Nasogastric tube if ordered  - Administer ordered antiemetic medications as needed  - Provide nonpharmacologic comfort measures as appropriate  - Advance diet as tolerated, if ordered  - Consider nutrition services referral to assist patient with adequate nutrition and appropriate food choices  Outcome: Progressing  Abd distention, no nausea complaint  Goal: Maintains or returns to baseline bowel function  Description: INTERVENTIONS:  - Assess bowel function  - Encourage oral fluids to ensure adequate hydration  - Administer IV fluids if ordered to ensure adequate hydration  - Administer ordered medications as needed  - Encourage mobilization and activity  - Consider nutritional services referral to assist patient with adequate nutrition and appropriate food choices  Outcome: Progressing  Goal: Oral mucous membranes remain intact  Description: INTERVENTIONS  - Assess oral mucosa and hygiene practices  - Implement preventative oral hygiene regimen  - Implement oral medicated treatments as ordered  - Initiate Nutrition services referral as needed  Outcome: Progressing     Problem: Nutrition/Hydration-ADULT  Goal: Nutrient/Hydration intake appropriate for improving, restoring or maintaining nutritional needs  Description: Monitor and assess patient's nutrition/hydration status for malnutrition  Collaborate with interdisciplinary team and initiate plan and interventions as ordered  Monitor patient's weight and dietary intake as ordered or per policy  Utilize nutrition screening tool and intervene as necessary  Determine patient's food preferences and provide high-protein, high-caloric foods as appropriate     Continues NPO    INTERVENTIONS:  - Monitor oral intake, urinary output, labs, and treatment plans  - Assess nutrition and hydration status and recommend course of action  - Evaluate amount of meals eaten  - Assist patient with eating if necessary   - Allow adequate time for meals  - Recommend/ encourage appropriate diets, oral nutritional supplements, and vitamin/mineral supplements  - Order, calculate, and assess calorie counts as needed  - Recommend, monitor, and adjust tube feedings and TPN/PPN based on assessed needs  - Assess need for intravenous fluids  - Provide specific nutrition/hydration education as appropriate  - Include patient/family/caregiver in decisions related to nutrition  Outcome: Progressing

## 2022-06-12 NOTE — PROGRESS NOTES
Progress Note - General Surgery   Colette See 47 y o  male MRN: [id number]  Unit/Bed#: 2 [address] Encounter: [id number]    Assessment:  · SBO: Hospital day 7  Patient reports 1 BM after enema yesterday  Patient reports nausea and abdominal distension through the night  Denies episode of emesis  · Ventral hernia:   · Morbid obesity: BMI 71 95    Plan:  Continue NPO  Will consult IR for PICC line for TPN  Recommend starting TPN  Patient will need possible transfer to hospitals secondary to body habitus and need for plastics  Obtain obstruction series after giving PO contrast    Continue IVF  Medical management per SLIM  Subjective/Objective   Chief Complaint:"I am okay "     Subjective:Paitnet was seen and examined bedside  Patient reports mild abdominal discomfort  Denies any flatus or Bowel movement  Objective:     Blood pressure 154/77, pulse 102, temperature 98 3 °F (36 8 °C), temperature source Oral, resp  rate 19, height 5' 11" (1 803 m), weight (!) 234 kg (516 lb 1 6 oz), SpO2 96 %  ,Body mass index is 71 98 kg/m²  Intake/Output Summary (Last 24 hours) at 6/12/2022 1215  Last data filed at 6/12/2022 1125  Gross per 24 hour   Intake 2687 66 ml   Output 2150 ml   Net 537 66 ml       Invasive Devices  Report    Peripheral Intravenous Line  Duration           Long-Dwell Peripheral IV (Midline) 06/06/22 Right Brachial 5 days    Peripheral IV 06/11/22 Left Forearm <1 day    Peripheral IV 06/11/22 Left;Lateral Antecubital <1 day          Drain  Duration           NG/OG/Enteral Tube Right nare <1 day                Physical Exam: /77 (BP Location: Right arm)   Pulse 102   Temp 98 3 °F (36 8 °C) (Oral)   Resp 19   Ht 5' 11" (1 803 m)   Wt (!) 234 kg (516 lb 1 6 oz)   SpO2 96%   BMI 71 98 kg/m²   General appearance: alert and oriented, in no acute distress  Lungs:  Normal effort, no respiratory distress  Heart:  Regular rate    Abdomen:  Obese abdomen,  hypoactive bowel sounds, mild distention of abdomen  Lab, Imaging and other studies:  I have personally reviewed pertinent lab results    , CBC:   Lab Results   Component Value Date    WBC 5 13 06/12/2022    HGB 11 4 (L) 06/12/2022    HCT 35 6 (L) 06/12/2022    MCV 95 06/12/2022     (L) 06/12/2022    MCH 30 5 06/12/2022    MCHC 32 0 06/12/2022    RDW 15 3 (H) 06/12/2022    MPV 10 2 06/12/2022   , CMP:   Lab Results   Component Value Date    SODIUM 135 (L) 06/12/2022    K 4 6 06/12/2022    CL 96 (L) 06/12/2022    CO2 35 (H) 06/12/2022    BUN 6 06/12/2022    CREATININE 0 76 06/12/2022    CALCIUM 8 8 06/12/2022    AST 38 06/12/2022    ALT 57 06/12/2022    ALKPHOS 96 06/12/2022    EGFR 105 06/12/2022     VTE Pharmacologic Prophylaxis: Heparin gtt  VTE Mechanical Prophylaxis: sequential compression device

## 2022-06-12 NOTE — PLAN OF CARE
Problem: RESPIRATORY - ADULT  Goal: Achieves optimal ventilation and oxygenation  Description: INTERVENTIONS:  - Assess for changes in respiratory status  - Assess for changes in mentation and behavior  - Position to facilitate oxygenation and minimize respiratory effort  - Oxygen administered by appropriate delivery if ordered  - Initiate smoking cessation education as indicated  - Encourage broncho-pulmonary hygiene including cough, deep breathe, Incentive Spirometry  - Assess the need for suctioning and aspirate as needed  - Assess and instruct to report SOB or any respiratory difficulty  - Respiratory Therapy support as indicated  Outcome: Progressing     Problem: GASTROINTESTINAL - ADULT  Goal: Minimal or absence of nausea and/or vomiting  Description: INTERVENTIONS:  - Administer IV fluids if ordered to ensure adequate hydration  - Maintain NPO status until nausea and vomiting are resolved  - Nasogastric tube if ordered  - Administer ordered antiemetic medications as needed  - Provide nonpharmacologic comfort measures as appropriate  - Advance diet as tolerated, if ordered  - Consider nutrition services referral to assist patient with adequate nutrition and appropriate food choices  Outcome: Progressing

## 2022-06-12 NOTE — ASSESSMENT & PLAN NOTE
· Status post bariatric surgery currently resides in nursing facility  Body mass index is 71 98 kg/m²

## 2022-06-12 NOTE — PROGRESS NOTES
Adela U  67   Progress Note - Jamee Boykin 1970, 47 y o  male MRN: [id number]  Unit/Bed#: 2 [address] Encounter: [id number]  Primary Care Provider: Dann Contreras MD   Date and time admitted to hospital: 6/6/2022  8:24 PM    Depression  Assessment & Plan  · Continue wellbutrin    History of DVT (deep vein thrombosis)  Assessment & Plan  · Prior to admission on pradaxa currently on heparin infusion while NPO    Hypothyroidism  Assessment & Plan  Continue Synthroid  · Recent TSH normal    COPD (chronic obstructive pulmonary disease) (HCC)  Assessment & Plan  · No exacerbation continue symbicort    Chronic diastolic heart failure (HCC)  Assessment & Plan  Wt Readings from Last 3 Encounters:   06/12/22 (!) 234 kg (516 lb 1 6 oz)   06/06/22 (!) 226 kg (498 lb 3 8 oz)   06/02/22 (!) 232 kg (512 lb)     · Prior to admission on torsemide and spironolactone  · No evidence of acute decompensation  · Hold on hold while NPO  · Continue IV fluid hydration but monitor volume status closely which may be difficult    Obesity hypoventilation syndrome (HCC)  Assessment & Plan  · Continue oxygen during the day and nocturnal CPAP    Super-super obese (HCC)  Assessment & Plan  · Status post bariatric surgery currently resides in nursing facility  Body mass index is 71 98 kg/m²      Atrial fibrillation with rapid ventricular response (HCC)  Assessment & Plan  · Atrial fibrillation prior to admission on metoprolol 100 mg b i d and pradaxa  · While NPO remains on diltiazem and heparin infusions    * SBO  Assessment & Plan  52-year-old gentleman morbidly obese with history of bariatric surgery currently resides at [facility] presented to [facility] ED for abdominal pain and transferred here to [facility] for surgical evaluation  · Initially had NG tube which was clamped and discontinued  · Patient remains NPO and given recurrence of symptoms attempted to obtain imaging but patient cannot have CT scan here due to weight  · Surgery following, may need possible transfer to [facility] secondary to body habitus and need for plastics evaluation  VTE Pharmacologic Prophylaxis: VTE Score: 10 Heparin drip  Code Status: Level 1 - Full Code    Subjective:   Patient seen and examined at bedside  No acute events overnight  Patient reports that he is still having abdominal pain  Objective:     Vitals:   Temp (24hrs), Av 1 °F (36 7 °C), Min:97 5 °F (36 4 °C), Max:98 3 °F (36 8 °C)    Temp:  [97 5 °F (36 4 °C)-98 3 °F (36 8 °C)] 98 3 °F (36 8 °C)  HR:  [] 102  Resp:  [19-20] 19  BP: (133-154)/() 154/77  SpO2:  [94 %-97 %] 96 %  Body mass index is 71 98 kg/m²  Input and Output Summary (last 24 hours): Intake/Output Summary (Last 24 hours) at 2022 1303  Last data filed at 2022 1218  Gross per 24 hour   Intake 2687 66 ml   Output 3150 ml   Net -462 34 ml       Physical Exam:   Physical Exam  Constitutional:       Comments: Morbidly obese  HENT:      Head: Normocephalic  Mouth/Throat:      Mouth: Mucous membranes are moist    Eyes:      Extraocular Movements: Extraocular movements intact  Cardiovascular:      Rate and Rhythm: Normal rate  Pulmonary:      Effort: Pulmonary effort is normal       Comments: Diminished  Abdominal:      Palpations: Abdomen is soft  Tenderness: There is abdominal tenderness  Skin:     General: Skin is warm  Neurological:      Mental Status: He is alert and oriented to person, place, and time     Psychiatric:         Mood and Affect: Mood normal          Behavior: Behavior normal        Additional Data:     Labs:  Results from last 7 days   Lab Units 22  0530 22  0603 22  0520   WBC Thousand/uL 5 13   < > 6 21   HEMOGLOBIN g/dL 11 4*   < > 12 6   HEMATOCRIT % 35 6*   < > 40 2   PLATELETS Thousands/uL 145*   < > 212   NEUTROS PCT %  --   --  70   LYMPHS PCT %  --   --  17   MONOS PCT %  --   --  9   EOS PCT %  --   -- 2    < > = values in this interval not displayed  Results from last 7 days   Lab Units 06/12/22  0806   SODIUM mmol/L 135*   POTASSIUM mmol/L 4 6   CHLORIDE mmol/L 96*   CO2 mmol/L 35*   BUN mg/dL 6   CREATININE mg/dL 0 76   ANION GAP mmol/L 4   CALCIUM mg/dL 8 8   ALBUMIN g/dL 2 7*   TOTAL BILIRUBIN mg/dL 0 70   ALK PHOS U/L 96   ALT U/L 57   AST U/L 38   GLUCOSE RANDOM mg/dL 135             Results from last 7 days   Lab Units 06/07/22  0620   HEMOGLOBIN A1C % 5 6           Lines/Drains:  Invasive Devices  Report    Peripheral Intravenous Line  Duration           Long-Dwell Peripheral IV (Midline) 06/06/22 Right Brachial 5 days    Peripheral IV 06/11/22 Left Forearm <1 day    Peripheral IV 06/11/22 Left;Lateral Antecubital <1 day          Drain  Duration           NG/OG/Enteral Tube Right nare <1 day              Telemetry:  Telemetry Orders (From admission, onward)             48 Hour Telemetry Monitoring  Continuous x 48 hours        Expiring   References:    Telemetry Guidelines   Question:  Reason for 48 Hour Telemetry  Answer:  Arrhythmias Requiring Medical Therapy (eg  SVT, Vtach/fib, Bradycardia, Uncontrolled A-fib)                      Imaging: Reviewed      Recent Cultures (last 7 days):         Last 24 Hours Medication List:   Current Facility-Administered Medications   Medication Dose Route Frequency Provider Last Rate    acetaminophen  325 mg Rectal Q4H PRN RADHA Hernandez      ammonium lactate   Topical BID RADHA Hernandez      barium  900 mL Oral Once in imaging Sarita Marks PA-C      budesonide-formoterol  2 puff Inhalation BID RADHA Hernandez      buPROPion  450 mg Oral Daily RADHA Hernandez      dextrose 5 % and sodium chloride 0 45 % with KCl 20 mEq/L  100 mL/hr Intravenous Continuous Huong Revankar,  mL/hr (06/12/22 0520)    diltiazem  15 mg/hr Intravenous Continuous Maria Isabel JIMENA Jones 15 mg/hr (06/12/22 0520)    docusate sodium  100 mg Oral BID Velia Jensen JIMENA Serrano      famotidine  20 mg Intravenous Q12H [address] CuiyiRADHA Lama      fluticasone  1 spray Each Nare Daily RADHA Hernandez      heparin (porcine)  3-35 Units/kg/hr (Order-Specific) Intravenous Titrated Huong Revankar, DO 19 1 Units/kg/hr (06/12/22 1125)    heparin (porcine)  2,000 Units Intravenous Q1H PRN Cuitrinity Guerrarik, CRNP      heparin (porcine)  4,000 Units Intravenous Q1H PRN Tyitrinity Guerraribobby, BANDARNP      levalbuterol  0 63 mg Nebulization Q4H PRN Amber Jim, BANDARNP      levalbuterol  1 25 mg Nebulization TID BANDAR HernandezNP      And    sodium chloride  3 mL Nebulization TID Culuciano Jim, RADHA      morphine injection  4 mg Intravenous Q4H PRN Tyitrinity Guerrarik, BANDARNP      morphine injection  2 mg Intravenous Q4H PRN RADHA Hernandez      mupirocin   Nasal Q12H [address] Huong Revankar, DO      ondansetron  4 mg Intravenous Q4H PRN RADHA Hernandez      phenol  1 spray Mouth/Throat Q2H PRN Rosalinda Chapa PA-C      promethazine  25 mg Intravenous Q6H PRN RADHA Diana          Today, Patient Was Seen By: Sophia Mcgarry MD    **Please Note: This note may have been constructed using a voice recognition system  **

## 2022-06-13 ENCOUNTER — APPOINTMENT (INPATIENT)
Dept: RADIOLOGY | Facility: HOSPITAL | Age: 52
DRG: 224 | End: 2022-06-13
Payer: COMMERCIAL

## 2022-06-13 ENCOUNTER — ANESTHESIA EVENT (INPATIENT)
Dept: PERIOP | Facility: HOSPITAL | Age: 52
DRG: 224 | End: 2022-06-13
Payer: COMMERCIAL

## 2022-06-13 ENCOUNTER — APPOINTMENT (INPATIENT)
Dept: NON INVASIVE DIAGNOSTICS | Facility: HOSPITAL | Age: 52
DRG: 224 | End: 2022-06-13
Payer: COMMERCIAL

## 2022-06-13 LAB
ALBUMIN SERPL BCP-MCNC: 2.6 G/DL (ref 3.5–5)
ALP SERPL-CCNC: 83 U/L (ref 46–116)
ALT SERPL W P-5'-P-CCNC: 44 U/L (ref 12–78)
ANION GAP SERPL CALCULATED.3IONS-SCNC: 5 MMOL/L (ref 4–13)
APTT PPP: 36 SECONDS (ref 23–37)
APTT PPP: 38 SECONDS (ref 23–37)
APTT PPP: 42 SECONDS (ref 23–37)
APTT PPP: 87 SECONDS (ref 23–37)
AST SERPL W P-5'-P-CCNC: 35 U/L (ref 5–45)
BASOPHILS # BLD AUTO: 0.02 THOUSANDS/ΜL (ref 0–0.1)
BASOPHILS NFR BLD AUTO: 0 % (ref 0–1)
BILIRUB SERPL-MCNC: 0.6 MG/DL (ref 0.2–1)
BUN SERPL-MCNC: 6 MG/DL (ref 5–25)
CALCIUM ALBUM COR SERPL-MCNC: 9.9 MG/DL (ref 8.3–10.1)
CALCIUM SERPL-MCNC: 8.8 MG/DL (ref 8.3–10.1)
CHLORIDE SERPL-SCNC: 100 MMOL/L (ref 100–108)
CO2 SERPL-SCNC: 31 MMOL/L (ref 21–32)
CREAT SERPL-MCNC: 0.73 MG/DL (ref 0.6–1.3)
EOSINOPHIL # BLD AUTO: 0.2 THOUSAND/ΜL (ref 0–0.61)
EOSINOPHIL NFR BLD AUTO: 3 % (ref 0–6)
ERYTHROCYTE [DISTWIDTH] IN BLOOD BY AUTOMATED COUNT: 15.1 % (ref 11.6–15.1)
FLUAV RNA RESP QL NAA+PROBE: NEGATIVE
FLUBV RNA RESP QL NAA+PROBE: NEGATIVE
GFR SERPL CREATININE-BSD FRML MDRD: 107 ML/MIN/1.73SQ M
GLUCOSE SERPL-MCNC: 127 MG/DL (ref 65–140)
HCT VFR BLD AUTO: 36.1 % (ref 36.5–49.3)
HGB BLD-MCNC: 11.4 G/DL (ref 12–17)
IMM GRANULOCYTES # BLD AUTO: 0.06 THOUSAND/UL (ref 0–0.2)
IMM GRANULOCYTES NFR BLD AUTO: 1 % (ref 0–2)
LYMPHOCYTES # BLD AUTO: 0.92 THOUSANDS/ΜL (ref 0.6–4.47)
LYMPHOCYTES NFR BLD AUTO: 15 % (ref 14–44)
MAGNESIUM SERPL-MCNC: 2.2 MG/DL (ref 1.6–2.6)
MCH RBC QN AUTO: 30.1 PG (ref 26.8–34.3)
MCHC RBC AUTO-ENTMCNC: 31.6 G/DL (ref 31.4–37.4)
MCV RBC AUTO: 95 FL (ref 82–98)
MONOCYTES # BLD AUTO: 0.64 THOUSAND/ΜL (ref 0.17–1.22)
MONOCYTES NFR BLD AUTO: 11 % (ref 4–12)
NEUTROPHILS # BLD AUTO: 4.26 THOUSANDS/ΜL (ref 1.85–7.62)
NEUTS SEG NFR BLD AUTO: 70 % (ref 43–75)
NRBC BLD AUTO-RTO: 0 /100 WBCS
PLATELET # BLD AUTO: 170 THOUSANDS/UL (ref 149–390)
PMV BLD AUTO: 10 FL (ref 8.9–12.7)
POTASSIUM SERPL-SCNC: 4.7 MMOL/L (ref 3.5–5.3)
PROT SERPL-MCNC: 6.7 G/DL (ref 6.4–8.2)
RBC # BLD AUTO: 3.79 MILLION/UL (ref 3.88–5.62)
RSV RNA RESP QL NAA+PROBE: NEGATIVE
SARS-COV-2 RNA RESP QL NAA+PROBE: NEGATIVE
SODIUM SERPL-SCNC: 136 MMOL/L (ref 136–145)
TRIGL SERPL-MCNC: 172 MG/DL
WBC # BLD AUTO: 6.1 THOUSAND/UL (ref 4.31–10.16)

## 2022-06-13 PROCEDURE — 94760 N-INVAS EAR/PLS OXIMETRY 1: CPT

## 2022-06-13 PROCEDURE — 84478 ASSAY OF TRIGLYCERIDES: CPT | Performed by: PHYSICIAN ASSISTANT

## 2022-06-13 PROCEDURE — 99232 SBSQ HOSP IP/OBS MODERATE 35: CPT | Performed by: SPECIALIST

## 2022-06-13 PROCEDURE — 94640 AIRWAY INHALATION TREATMENT: CPT

## 2022-06-13 PROCEDURE — 80053 COMPREHEN METABOLIC PANEL: CPT | Performed by: FAMILY MEDICINE

## 2022-06-13 PROCEDURE — 0241U HB NFCT DS VIR RESP RNA 4 TRGT: CPT | Performed by: PHYSICIAN ASSISTANT

## 2022-06-13 PROCEDURE — 77001 FLUOROGUIDE FOR VEIN DEVICE: CPT

## 2022-06-13 PROCEDURE — NC001 PR NO CHARGE: Performed by: RADIOLOGY

## 2022-06-13 PROCEDURE — 83735 ASSAY OF MAGNESIUM: CPT | Performed by: FAMILY MEDICINE

## 2022-06-13 PROCEDURE — 76937 US GUIDE VASCULAR ACCESS: CPT

## 2022-06-13 PROCEDURE — 99233 SBSQ HOSP IP/OBS HIGH 50: CPT | Performed by: SURGERY

## 2022-06-13 PROCEDURE — 85730 THROMBOPLASTIN TIME PARTIAL: CPT | Performed by: FAMILY MEDICINE

## 2022-06-13 PROCEDURE — 99232 SBSQ HOSP IP/OBS MODERATE 35: CPT | Performed by: INTERNAL MEDICINE

## 2022-06-13 PROCEDURE — 71045 X-RAY EXAM CHEST 1 VIEW: CPT

## 2022-06-13 PROCEDURE — 02HV33Z INSERTION OF INFUSION DEVICE INTO SUPERIOR VENA CAVA, PERCUTANEOUS APPROACH: ICD-10-PCS | Performed by: RADIOLOGY

## 2022-06-13 PROCEDURE — 94660 CPAP INITIATION&MGMT: CPT

## 2022-06-13 PROCEDURE — C1751 CATH, INF, PER/CENT/MIDLINE: HCPCS

## 2022-06-13 PROCEDURE — 85025 COMPLETE CBC W/AUTO DIFF WBC: CPT | Performed by: FAMILY MEDICINE

## 2022-06-13 PROCEDURE — 85730 THROMBOPLASTIN TIME PARTIAL: CPT | Performed by: PHYSICIAN ASSISTANT

## 2022-06-13 PROCEDURE — 36573 INSJ PICC RS&I 5 YR+: CPT

## 2022-06-13 PROCEDURE — 85730 THROMBOPLASTIN TIME PARTIAL: CPT | Performed by: INTERNAL MEDICINE

## 2022-06-13 RX ORDER — HEPARIN SODIUM 1000 [USP'U]/ML
2000 INJECTION, SOLUTION INTRAVENOUS; SUBCUTANEOUS
Status: DISCONTINUED | OUTPATIENT
Start: 2022-06-13 | End: 2022-06-14

## 2022-06-13 RX ORDER — HEPARIN SODIUM 1000 [USP'U]/ML
4000 INJECTION, SOLUTION INTRAVENOUS; SUBCUTANEOUS
Status: DISCONTINUED | OUTPATIENT
Start: 2022-06-13 | End: 2022-06-14

## 2022-06-13 RX ORDER — HEPARIN SODIUM 10000 [USP'U]/100ML
3-20 INJECTION, SOLUTION INTRAVENOUS
Status: DISCONTINUED | OUTPATIENT
Start: 2022-06-13 | End: 2022-06-14

## 2022-06-13 RX ORDER — HEPARIN SODIUM 10000 [USP'U]/100ML
3-35 INJECTION, SOLUTION INTRAVENOUS
Status: DISCONTINUED | OUTPATIENT
Start: 2022-06-13 | End: 2022-06-13

## 2022-06-13 RX ORDER — LIDOCAINE WITH 8.4% SOD BICARB 0.9%(10ML)
SYRINGE (ML) INJECTION CODE/TRAUMA/SEDATION MEDICATION
Status: COMPLETED | OUTPATIENT
Start: 2022-06-13 | End: 2022-06-13

## 2022-06-13 RX ADMIN — LEVALBUTEROL HYDROCHLORIDE 1.25 MG: 1.25 SOLUTION, CONCENTRATE RESPIRATORY (INHALATION) at 19:25

## 2022-06-13 RX ADMIN — BUPROPION 450 MG: 150 TABLET, EXTENDED RELEASE ORAL at 08:11

## 2022-06-13 RX ADMIN — LIDOCAINE HYDROCHLORIDE 3 ML: 10 INJECTION, SOLUTION INFILTRATION; PERINEURAL at 10:01

## 2022-06-13 RX ADMIN — LEVALBUTEROL HYDROCHLORIDE 1.25 MG: 1.25 SOLUTION, CONCENTRATE RESPIRATORY (INHALATION) at 13:45

## 2022-06-13 RX ADMIN — MORPHINE SULFATE 2 MG: 2 INJECTION, SOLUTION INTRAMUSCULAR; INTRAVENOUS at 02:54

## 2022-06-13 RX ADMIN — Medication: at 12:54

## 2022-06-13 RX ADMIN — DEXTROSE, SODIUM CHLORIDE, AND POTASSIUM CHLORIDE 125 ML/HR: 5; .9; .15 INJECTION INTRAVENOUS at 06:41

## 2022-06-13 RX ADMIN — DEXTROSE, SODIUM CHLORIDE, AND POTASSIUM CHLORIDE 125 ML/HR: 5; .9; .15 INJECTION INTRAVENOUS at 14:43

## 2022-06-13 RX ADMIN — ISODIUM CHLORIDE 3 ML: 0.03 SOLUTION RESPIRATORY (INHALATION) at 07:39

## 2022-06-13 RX ADMIN — FAMOTIDINE 20 MG: 10 INJECTION, SOLUTION INTRAVENOUS at 21:16

## 2022-06-13 RX ADMIN — HEPARIN SODIUM 4000 UNITS: 1000 INJECTION INTRAVENOUS; SUBCUTANEOUS at 14:46

## 2022-06-13 RX ADMIN — FLUTICASONE PROPIONATE 1 SPRAY: 50 SPRAY, METERED NASAL at 08:10

## 2022-06-13 RX ADMIN — FAMOTIDINE 20 MG: 10 INJECTION, SOLUTION INTRAVENOUS at 09:15

## 2022-06-13 RX ADMIN — DILTIAZEM HYDROCHLORIDE 15 MG/HR: 5 INJECTION INTRAVENOUS at 22:56

## 2022-06-13 RX ADMIN — MORPHINE SULFATE 2 MG: 2 INJECTION, SOLUTION INTRAMUSCULAR; INTRAVENOUS at 18:49

## 2022-06-13 RX ADMIN — ISODIUM CHLORIDE 3 ML: 0.03 SOLUTION RESPIRATORY (INHALATION) at 19:25

## 2022-06-13 RX ADMIN — Medication: at 17:12

## 2022-06-13 RX ADMIN — BUDESONIDE AND FORMOTEROL FUMARATE DIHYDRATE 2 PUFF: 160; 4.5 AEROSOL RESPIRATORY (INHALATION) at 21:17

## 2022-06-13 RX ADMIN — LEVALBUTEROL HYDROCHLORIDE 1.25 MG: 1.25 SOLUTION, CONCENTRATE RESPIRATORY (INHALATION) at 07:39

## 2022-06-13 RX ADMIN — HEPARIN SODIUM 4000 UNITS: 1000 INJECTION INTRAVENOUS; SUBCUTANEOUS at 07:20

## 2022-06-13 RX ADMIN — MORPHINE SULFATE 2 MG: 2 INJECTION, SOLUTION INTRAMUSCULAR; INTRAVENOUS at 08:09

## 2022-06-13 RX ADMIN — MORPHINE SULFATE 2 MG: 2 INJECTION, SOLUTION INTRAMUSCULAR; INTRAVENOUS at 22:50

## 2022-06-13 RX ADMIN — MORPHINE SULFATE 2 MG: 2 INJECTION, SOLUTION INTRAMUSCULAR; INTRAVENOUS at 12:17

## 2022-06-13 RX ADMIN — MUPIROCIN 1 APPLICATION: 20 OINTMENT TOPICAL at 08:11

## 2022-06-13 RX ADMIN — ISODIUM CHLORIDE 3 ML: 0.03 SOLUTION RESPIRATORY (INHALATION) at 13:46

## 2022-06-13 RX ADMIN — Medication: at 21:14

## 2022-06-13 RX ADMIN — BUDESONIDE AND FORMOTEROL FUMARATE DIHYDRATE 2 PUFF: 160; 4.5 AEROSOL RESPIRATORY (INHALATION) at 08:10

## 2022-06-13 RX ADMIN — HEPARIN SODIUM 4000 UNITS: 1000 INJECTION INTRAVENOUS; SUBCUTANEOUS at 00:29

## 2022-06-13 RX ADMIN — DILTIAZEM HYDROCHLORIDE 15 MG/HR: 5 INJECTION INTRAVENOUS at 05:19

## 2022-06-13 RX ADMIN — DILTIAZEM HYDROCHLORIDE 15 MG/HR: 5 INJECTION INTRAVENOUS at 14:41

## 2022-06-13 NOTE — PROGRESS NOTES
78-year-old male patient who suffers from multiple medical comorbidities and is super morbidly obese was admitted with small-bowel obstruction  Patient also has a large recurrent ventral hernia with loss of domain  Patient has history of laparoscopic cholecystectomy, laparoscopic sleeve gastrectomy, robotic incisional hernia repair, exploratory laparotomy with removal of the mesh and lysis of adhesions  Patient developed a recurrent large ventral hernia  He has failed conservative management over last few days  In fact at 1 point he has started passing flatus but now he is obstructed again with very high NG tube output and abdominal distension  I had a long discussion with the patient regarding goals of surgery  I told him that plan would be to do an exploratory laparotomy and relieve the obstruction  I told him that a definitive repair of his large ventral hernia may not be possible at this time  I also told him that because of his body habitus and weight it may also not be long lasting  We talked about possible complications including but not limited to infection, bleeding, bowel injury, need for stoma formation, need for open abdomen, placement of abdominal wound VAC, placement of bridged mesh  He verbalized understanding  I also spoke to his girlfriend who he wants to be his healthcare proxy on formed and explained the procedure and expectations from the procedure to her  Patient is scheduled for exploratory laparotomy tomorrow  We should stop his IV heparin tonight  Anesthesia consult was made in preparation for the procedure  The patient signed the consent after verbalizing understanding

## 2022-06-13 NOTE — ANESTHESIA PREPROCEDURE EVALUATION
Procedure:  LAPAROTOMY EXPLORATORY possible open (N/A Abdomen)    Relevant Problems   CARDIO   (+) Atrial fibrillation with RVR (HCC)   (+) Atrial fibrillation with rapid ventricular response (HCC)   (+) Hypertension      ENDO   (+) Hypothyroidism      GI/HEPATIC   (+) SBO      NEURO/PSYCH   (+) Depression   (+) History of DVT (deep vein thrombosis)      PULMONARY   (+) COPD (chronic obstructive pulmonary disease) (HCC)   (+) Chronic respiratory failure with hypoxia (HCC)      Patient has a h/o afib , On heparin drip on the floor  Will avoid epidural in lieu of his body habitus since he cant sit up and position himself due to belly pain  Physical Exam    Airway    Mallampati score: II  TM Distance: >3 FB  Neck ROM: full     Dental   Comment: Poor dentition  Risk of damage explained in detail, No notable dental hx     Cardiovascular  Cardiovascular exam normal    Pulmonary  Pulmonary exam normal     Other Findings        Anesthesia Plan  ASA Score- 3     Anesthesia Type- general with ASA Monitors  Additional Monitors: arterial line and central venous line  Airway Plan: ETT  Plan Factors-Exercise tolerance (METS): <4 METS  Chart reviewed  EKG reviewed  Imaging results reviewed  Existing labs reviewed  Patient summary reviewed  Patient is not a current smoker  Obstructive sleep apnea risk education given perioperatively  Induction- intravenous and rapid sequence induction  Postoperative Plan- Plan for postoperative opioid use  Planned trial extubation    Informed Consent- Anesthetic plan and risks discussed with patient  I personally reviewed this patient with the CRNA  Discussed and agreed on the Anesthesia Plan with the CRNA  Bao Macias

## 2022-06-13 NOTE — PROGRESS NOTES
Progress Note - General Surgery   Candelaria Porteous 47 y o  male MRN: [id number]  Unit/Bed#: 2 [address] Encounter: [id number]    Assessment:  · SBO: Hospital day 7  Patient reports 1 BM after enema yesterday  Patient reports nausea and abdominal distension through the night  Denies episode of emesis  Past surgical hx of gastric sleeve 08/ 2018 Dr Brittney Flores, laparoscopic ventral hernia repair 2019 and  Exploratory laparotomy secondary to SBO x 1 year ago  · COPD: baseline 4L NC at baseline  · Ventral hernia:   · Atrial fibrillation: Pradaxa on hold  · Hx of DVT  · Morbid obesity: BMI 71 95      Plan:  Continue NPO  Obtain Picc line  Consult nutrition TPN recommendations  Start TPN  Consider transport [facility] secondary to body habitus, need for repeat CT, need for plastics for possible mucocutaneous flap mobilization  Will discuss with attending  Consult anesthesia possible exploratory laparotomy tomorrow  Pending possible transfer  Stop heprin gtt after midnight  Subjective/Objective   Chief Complaint:" I am okay  I have some abdominal discomfort "    Subjective:  Patient was seen examined bedside  Patient reports no acute changes through the night  Patient reports mild midepigastric abdominal discomfort and sore throat  Patient denies nausea vomiting since insertion of NG tube  Objective:    Blood pressure 126/72, pulse 93, temperature 98 8 °F (37 1 °C), temperature source Oral, resp  rate 18, height 5' 11" (1 803 m), weight (!) 239 kg (526 lb 14 4 oz), SpO2 100 %  ,Body mass index is 73 49 kg/m²        Intake/Output Summary (Last 24 hours) at 6/13/2022 0840  Last data filed at 6/13/2022 0656  Gross per 24 hour   Intake 2687 66 ml   Output 2700 ml   Net -12 34 ml       Invasive Devices  Report    Peripheral Intravenous Line  Duration           Long-Dwell Peripheral IV (Midline) 06/06/22 Right Brachial 6 days    Peripheral IV 06/11/22 Left Forearm 1 day          Drain  Duration NG/OG/Enteral Tube Right nare 1 day                Physical Exam: /72 (BP Location: Left arm) Comment (BP Location): forearm  Pulse 93   Temp 98 8 °F (37 1 °C) (Oral)   Resp 18   Ht 5' 11" (1 803 m)   Wt (!) 239 kg (526 lb 14 4 oz)   SpO2 100%   BMI 73 49 kg/m²   General appearance: alert and oriented, in no acute distress  Head: Normocephalic, without obvious abnormality, atraumatic  Lungs: Normal effort  No respirtory distress  4L nc  Heart: regular rate  Abdomen: obease abdomen  distension, Ventral hernia reduciable Mild TTP mid epigastric region  Lab, Imaging and other studies:  I have personally reviewed pertinent lab results  , CBC:   Lab Results   Component Value Date    WBC 6 10 06/13/2022    HGB 11 4 (L) 06/13/2022    HCT 36 1 (L) 06/13/2022    MCV 95 06/13/2022     06/13/2022    MCH 30 1 06/13/2022    MCHC 31 6 06/13/2022    RDW 15 1 06/13/2022    MPV 10 0 06/13/2022    NRBC 0 06/13/2022   , CMP:   Lab Results   Component Value Date    SODIUM 136 06/13/2022    K 4 7 06/13/2022     06/13/2022    CO2 31 06/13/2022    BUN 6 06/13/2022    CREATININE 0 73 06/13/2022    CALCIUM 8 8 06/13/2022    AST 35 06/13/2022    ALT 44 06/13/2022    ALKPHOS 83 06/13/2022    EGFR 107 06/13/2022     VTE Pharmacologic Prophylaxis: Heparin gtt     VTE Mechanical Prophylaxis: sequential compression device

## 2022-06-13 NOTE — PLAN OF CARE
Problem: RESPIRATORY - ADULT  Goal: Achieves optimal ventilation and oxygenation  Description: INTERVENTIONS:  - Assess for changes in respiratory status  - Assess for changes in mentation and behavior  - Position to facilitate oxygenation and minimize respiratory effort  - Oxygen administered by appropriate delivery if ordered  - Initiate smoking cessation education as indicated  - Encourage broncho-pulmonary hygiene including cough, deep breathe, Incentive Spirometry  - Assess the need for suctioning and aspirate as needed  - Assess and instruct to report SOB or any respiratory difficulty  - Respiratory Therapy support as indicated  Outcome: Progressing     Problem: CARDIOVASCULAR - ADULT  Goal: Maintains optimal cardiac output and hemodynamic stability  Description: INTERVENTIONS:  - Monitor I/O, vital signs and rhythm  - Monitor for S/S and trends of decreased cardiac output  - Administer and titrate ordered vasoactive medications to optimize hemodynamic stability  - Assess quality of pulses, skin color and temperature  - Assess for signs of decreased coronary artery perfusion  - Instruct patient to report change in severity of symptoms  Outcome: Progressing  Goal: Absence of cardiac dysrhythmias or at baseline rhythm  Description: INTERVENTIONS:  - Continuous cardiac monitoring, vital signs, obtain 12 lead EKG if ordered  - Administer antiarrhythmic and heart rate control medications as ordered  - Monitor electrolytes and administer replacement therapy as ordered  Outcome: Progressing     Problem: GASTROINTESTINAL - ADULT  Goal: Minimal or absence of nausea and/or vomiting  Description: INTERVENTIONS:  - Administer IV fluids if ordered to ensure adequate hydration  - Maintain NPO status until nausea and vomiting are resolved  - Nasogastric tube if ordered  - Administer ordered antiemetic medications as needed  - Provide nonpharmacologic comfort measures as appropriate  - Advance diet as tolerated, if ordered  - Consider nutrition services referral to assist patient with adequate nutrition and appropriate food choices  Outcome: Progressing  Goal: Maintains or returns to baseline bowel function  Description: INTERVENTIONS:  - Assess bowel function  - Encourage oral fluids to ensure adequate hydration  - Administer IV fluids if ordered to ensure adequate hydration  - Administer ordered medications as needed  - Encourage mobilization and activity  - Consider nutritional services referral to assist patient with adequate nutrition and appropriate food choices  Outcome: Progressing  Goal: Oral mucous membranes remain intact  Description: INTERVENTIONS  - Assess oral mucosa and hygiene practices  - Implement preventative oral hygiene regimen  - Implement oral medicated treatments as ordered  - Initiate Nutrition services referral as needed  Outcome: Progressing     Problem: MOBILITY - ADULT  Goal: Maintain or return to baseline ADL function  Description: INTERVENTIONS:  -  Assess patient's ability to carry out ADLs; assess patient's baseline for ADL function and identify physical deficits which impact ability to perform ADLs (bathing, care of mouth/teeth, toileting, grooming, dressing, etc )  - Assess/evaluate cause of self-care deficits   - Assess range of motion  - Assess patient's mobility; develop plan if impaired  - Assess patient's need for assistive devices and provide as appropriate  - Encourage maximum independence but intervene and supervise when necessary  - Involve family in performance of ADLs  - Assess for home care needs following discharge   - Consider OT consult to assist with ADL evaluation and planning for discharge  - Provide patient education as appropriate  Outcome: Progressing  Goal: Maintains/Returns to pre admission functional level  Description: INTERVENTIONS:  - Perform BMAT or MOVE assessment daily    - Set and communicate daily mobility goal to care team and patient/family/caregiver     - Collaborate with rehabilitation services on mobility goals if consulted  - Out of bed for toileting  - Record patient progress and toleration of activity level   Outcome: Progressing     Problem: Potential for Falls  Goal: Patient will remain free of falls  Description: INTERVENTIONS:  - Educate patient/family on patient safety including physical limitations  - Instruct patient to call for assistance with activity   - Consult OT/PT to assist with strengthening/mobility   - Keep Call bell within reach  - Keep bed low and locked with side rails adjusted as appropriate  - Keep care items and personal belongings within reach  - Initiate and maintain comfort rounds  - Make Fall Risk Sign visible to staff  - Apply yellow socks and bracelet for high fall risk patients  - Consider moving patient to room near nurses station  Outcome: Progressing

## 2022-06-13 NOTE — PROCEDURES
PICC Line Insertion    Date/Time: 6/13/2022 10:15 AM  Performed by: Reid Mccann MD  Authorized by: Kathy Hunt MD     Patient location:  IR  Consent:     Consent obtained:  Written    Consent given by:  Patient    Risks discussed:  Bleeding and infection    Alternatives discussed:  No treatment, delayed treatment, observation, referral and alternative treatment  Universal protocol:     Procedure explained and questions answered to patient or proxy's satisfaction: yes      Relevant documents present and verified: yes      Test results available and properly labeled: yes      Radiology Images displayed and confirmed  If images not available, report reviewed: yes      Required blood products, implants, devices, and special equipment available: yes      Site/side marked: yes      Immediately prior to procedure, a time out was called: yes      Patient identity confirmed:  Verbally with patient, arm band and provided demographic data  Pre-procedure details:     Hand hygiene: Hand hygiene performed prior to insertion      Sterile barrier technique: All elements of maximal sterile technique followed      Skin preparation:  ChloraPrep    Skin preparation agent: Skin preparation agent completely dried prior to procedure    Indications:     PICC line indications: total parenteral nutrition    Anesthesia (see MAR for exact dosages):      Anesthesia method:  Local infiltration    Local anesthetic:  Lidocaine 1% w/o epi  Procedure details:     Location:  Basilic    Vessel type: vein      Laterality:  Right    Approach: percutaneous technique used      Patient position:  Flat    Procedural supplies:  Double lumen    Catheter size:  5 Fr    Landmarks identified: yes      Ultrasound guidance: yes      Ultrasound image availability:  Images available in PACS    Sterile ultrasound techniques: Sterile gel and sterile probe covers were used      Number of attempts:  1    Successful placement: yes      Total catheter length (cm):  50    Catheter out on skin (cm):  0    Arm circumference:  50  Post-procedure details:     Post-procedure:  Dressing applied and securement device placed    Assessment:  Blood return through all ports and placement verified by x-ray    Post-procedure complications: none      Patient tolerance of procedure:   Tolerated well, no immediate complications    Observer: Yes      Observer name:  Travis Johnson RN

## 2022-06-13 NOTE — CONSULTS
Recommend standard bag for 24 hours then change to 1200 mL 30% Dextrose, 600 mL 15% amino acids, 250 mL 20% lipids for a total volume of 2050 mL

## 2022-06-13 NOTE — PLAN OF CARE
Problem: RESPIRATORY - ADULT  Goal: Achieves optimal ventilation and oxygenation  Description: INTERVENTIONS:  - Assess for changes in respiratory status  - Assess for changes in mentation and behavior  - Position to facilitate oxygenation and minimize respiratory effort  - Oxygen administered by appropriate delivery if ordered  - Initiate smoking cessation education as indicated  - Encourage broncho-pulmonary hygiene including cough, deep breathe, Incentive Spirometry  - Assess the need for suctioning and aspirate as needed  - Assess and instruct to report SOB or any respiratory difficulty  - Respiratory Therapy support as indicated  Outcome: Progressing     Problem: Prexisting or High Potential for Compromised Skin Integrity  Goal: Skin integrity is maintained or improved  Description: INTERVENTIONS:  - Identify patients at risk for skin breakdown  - Assess and monitor skin integrity  - Assess and monitor nutrition and hydration status  - Monitor labs   - Assess for incontinence   - Turn and reposition patient  - Assist with mobility/ambulation  - Relieve pressure over bony prominences  - Avoid friction and shearing  - Provide appropriate hygiene as needed including keeping skin clean and dry  - Evaluate need for skin moisturizer/barrier cream  - Collaborate with interdisciplinary team   - Patient/family teaching  - Consider wound care consult   Outcome: Progressing     Problem: Nutrition/Hydration-ADULT  Goal: Nutrient/Hydration intake appropriate for improving, restoring or maintaining nutritional needs  Description: Monitor and assess patient's nutrition/hydration status for malnutrition  Collaborate with interdisciplinary team and initiate plan and interventions as ordered  Monitor patient's weight and dietary intake as ordered or per policy  Utilize nutrition screening tool and intervene as necessary  Determine patient's food preferences and provide high-protein, high-caloric foods as appropriate  INTERVENTIONS:  - Monitor oral intake, urinary output, labs, and treatment plans  - Assess nutrition and hydration status and recommend course of action  - Evaluate amount of meals eaten  - Assist patient with eating if necessary   - Allow adequate time for meals  - Recommend/ encourage appropriate diets, oral nutritional supplements, and vitamin/mineral supplements  - Order, calculate, and assess calorie counts as needed  - Recommend, monitor, and adjust tube feedings and TPN/PPN based on assessed needs  - Assess need for intravenous fluids  - Provide specific nutrition/hydration education as appropriate  - Include patient/family/caregiver in decisions related to nutrition  Outcome: Progressing

## 2022-06-13 NOTE — UTILIZATION REVIEW
Continued Stay Review    Date: 6/13/22                          Current Patient Class: inpatient  Current Level of Care: telemetry   HPI:52 y o  male initially admitted on 6/6/22     Assessment/Plan:   SBO 2nd ventral hernia  Nausea & abd distention throughout the night  NGT in place  Abdomen: obese, distension, Ventral hernia reduciable, Mild TTP mid epigastric region  Using IV Morphine for pain control  IV Heparin & Cardizem gtts continued for afib while NPO  IVF maintained  PICC placed today for TPN  Consider transport to [facility] secondary to body habitus, need for repeat CT, need for plastics for possible mucocutaneous flap mobilization  Vital Signs:   Date/Time Temp Pulse Resp BP MAP (mmHg) SpO2 Calculated FIO2 (%) - Nasal Cannula O2 Flow Rate (L/min) Nasal Cannula O2 Flow Rate (L/min) O2 Device O2 Interface Device Patient Position - Orthostatic VS   06/13/22 07:52:46 98 8 °F (37 1 °C) 93 18 126/72 90 100 % 28 -- 2 L/min Nasal cannula --  Lying   O2 Interface Device: not on cpap at 06/13/22 0752   06/13/22 0739 -- -- -- -- -- 99 % 28 -- 2 L/min Nasal cannula -- --   06/13/22 0330 -- -- -- -- -- -- -- -- -- -- Full face mask --   06/13/22 03:24:50 99 3 °F (37 4 °C) 53 Abnormal  20 135/72 93 93 % -- -- -- -- -- --     Pertinent Labs/Diagnostic Results:   6/13  Cxr=  Tip of right-sided PICC line in superior vena cava    Nasogastric tube present although tip not included on this exam   Pulmonary vascular congestion and suggestion of mild pulmonary edema    Results from last 7 days   Lab Units 06/13/22  0636 06/12/22  0530 06/11/22  0720 06/09/22  0603   WBC Thousand/uL 6 10 5 13 5 72 7 09   HEMOGLOBIN g/dL 11 4* 11 4* 12 7 12 3   HEMATOCRIT % 36 1* 35 6* 41 4 38 3   PLATELETS Thousands/uL 170 145* 151 168   NEUTROS ABS Thousands/µL 4 26  --   --   --      Results from last 7 days   Lab Units 06/13/22  0636 06/12/22  0407 06/11/22  0720 06/10/22  0544 06/09/22  0603 06/08/22  0602 06/07/22  2828 06/06/22  2315   SODIUM mmol/L 136 135* 134* 135* 135*   < > 144  --    POTASSIUM mmol/L 4 7 4 6 4 7 4 2 3 8   < > 3 8 3 6   CHLORIDE mmol/L 100 96* 98* 95* 94*   < > 99*  --    CO2 mmol/L 31 35* 28 35* 36*   < > 41*  --    ANION GAP mmol/L 5 4 8 5 5   < > 4  --    BUN mg/dL 6 6 7 7 8   < > 13  --    CREATININE mg/dL 0 73 0 76 0 70 0 83 0 82   < > 0 78  --    EGFR ml/min/1 73sq m 107 105 109 101 102   < > 104  --    CALCIUM mg/dL 8 8 8 8 9 1 8 7 9 1   < > 9 3  --    MAGNESIUM mg/dL 2 2 2 0  --   --  2 2  --  2 5 2 4   PHOSPHORUS mg/dL  --  4 0  --   --  3 9  --   --   --     < > = values in this interval not displayed       Results from last 7 days   Lab Units 06/13/22  0636 06/12/22  0806   AST U/L 35 38   ALT U/L 44 57   ALK PHOS U/L 83 96   TOTAL PROTEIN g/dL 6 7 6 8   ALBUMIN g/dL 2 6* 2 7*   TOTAL BILIRUBIN mg/dL 0 60 0 70     Results from last 7 days   Lab Units 06/13/22  0636 06/12/22  0806 06/11/22  0720 06/10/22  0544 06/09/22  0603 06/08/22  0602 06/07/22  0620   GLUCOSE RANDOM mg/dL 127 135 128 138 140 127 122     Results from last 7 days   Lab Units 06/07/22  0620   HEMOGLOBIN A1C % 5 6   EAG mg/dl 114     Results from last 7 days   Lab Units 06/08/22  2214 06/08/22  1913 06/08/22  1657   HS TNI 0HR ng/L  --   --  3   HS TNI 2HR ng/L  --  8  --    HSTNI D2 ng/L  --  5  --    HS TNI 4HR ng/L 3  --   --    HSTNI D4 ng/L 0  --   --      Results from last 7 days   Lab Units 06/13/22  0636 06/12/22  2352 06/12/22  1719   PTT seconds 36 38* 69*     Medications:   ammonium lactate, , Topical, BID  budesonide-formoterol, 2 puff, Inhalation, BID  buPROPion, 450 mg, Oral, Daily  famotidine, 20 mg, Intravenous, Q12H MARY  fluticasone, 1 spray, Each Nare, Daily  levalbuterol, 1 25 mg, Nebulization, TID   And  sodium chloride, 3 mL, Nebulization, TID    Continuous IV Infusions:  dextrose 5 % and sodium chloride 0 9 % with KCl 20 mEq/L, 125 mL/hr, Intravenous, Continuous  diltiazem, 15 mg/hr, Intravenous, Continuous  heparin (porcine), 3-35 Units/kg/hr (Order-Specific), Intravenous, Titrated    PRN Meds:  acetaminophen, 325 mg, Rectal, Q4H PRN  barium, 900 mL, Oral, Once in imaging  heparin (porcine), 2,000 Units, Intravenous, Q1H PRN  heparin (porcine), 4,000 Units, Intravenous, Q1H PRN  levalbuterol, 0 63 mg, Nebulization, Q4H PRN  morphine injection, 4 mg, Intravenous, Q4H PRN  morphine injection, 2 mg, Intravenous, Q4H PRN  ondansetron, 4 mg, Intravenous, Q4H PRN  phenol, 1 spray, Mouth/Throat, Q2H PRN  promethazine, 25 mg, Intravenous, Q6H PRN    Discharge Plan: [facility]    Network Utilization Review Department  ATTENTION: Please call with any questions or concerns to [phone] and carefully listen to the prompts so that you are directed to the right person  All voicemails are confidential   Freda Lewis all requests for admission clinical reviews, approved or denied determinations and any other requests to dedicated fax number below belonging to the campus where the patient is receiving treatment   List of dedicated fax numbers for the Facilities:  [address] DENIALS (Administrative/Medical Necessity) [phone]   [address] (Maternity/NICU/Pediatrics) [phone]   [address] [place] [address] [place] [address] [address] [place] [address] [address] [place] [address] [facility] [facility] [phone]

## 2022-06-14 ENCOUNTER — HOSPITAL ENCOUNTER (INPATIENT)
Facility: HOSPITAL | Age: 52
LOS: 35 days | Discharge: NURSING, REHAB OR LONG-TERM CARE | DRG: 230 | End: 2022-07-19
Attending: SURGERY | Admitting: SURGERY
Payer: COMMERCIAL

## 2022-06-14 ENCOUNTER — ANESTHESIA (INPATIENT)
Dept: PERIOP | Facility: HOSPITAL | Age: 52
DRG: 224 | End: 2022-06-14
Payer: COMMERCIAL

## 2022-06-14 VITALS
OXYGEN SATURATION: 99 % | HEART RATE: 69 BPM | HEIGHT: 71 IN | DIASTOLIC BLOOD PRESSURE: 59 MMHG | BODY MASS INDEX: 44.1 KG/M2 | TEMPERATURE: 96.8 F | SYSTOLIC BLOOD PRESSURE: 129 MMHG | RESPIRATION RATE: 19 BRPM | WEIGHT: 315 LBS

## 2022-06-14 DIAGNOSIS — E66.2 OBESITY HYPOVENTILATION SYNDROME (HCC): ICD-10-CM

## 2022-06-14 DIAGNOSIS — I48.91 ATRIAL FIBRILLATION WITH RVR (HCC): Primary | ICD-10-CM

## 2022-06-14 DIAGNOSIS — E87.1 HYPONATREMIA: ICD-10-CM

## 2022-06-14 DIAGNOSIS — K56.609 SMALL BOWEL OBSTRUCTION (HCC): ICD-10-CM

## 2022-06-14 LAB
ALBUMIN SERPL BCP-MCNC: 2.4 G/DL (ref 3.5–5)
ALBUMIN SERPL BCP-MCNC: 2.5 G/DL (ref 3.5–5)
ALP SERPL-CCNC: 65 U/L (ref 46–116)
ALP SERPL-CCNC: 72 U/L (ref 46–116)
ALT SERPL W P-5'-P-CCNC: 30 U/L (ref 12–78)
ALT SERPL W P-5'-P-CCNC: 32 U/L (ref 12–78)
ANION GAP SERPL CALCULATED.3IONS-SCNC: 10 MMOL/L (ref 4–13)
ANION GAP SERPL CALCULATED.3IONS-SCNC: 5 MMOL/L (ref 4–13)
APTT PPP: 22 SECONDS (ref 23–37)
ARTERIAL PATENCY WRIST A: NO
AST SERPL W P-5'-P-CCNC: 19 U/L (ref 5–45)
AST SERPL W P-5'-P-CCNC: 21 U/L (ref 5–45)
BASE EXCESS BLDA CALC-SCNC: -1.6 MMOL/L
BASOPHILS # BLD AUTO: 0.01 THOUSANDS/ΜL (ref 0–0.1)
BASOPHILS # BLD AUTO: 0.01 THOUSANDS/ΜL (ref 0–0.1)
BASOPHILS NFR BLD AUTO: 0 % (ref 0–1)
BASOPHILS NFR BLD AUTO: 0 % (ref 0–1)
BILIRUB SERPL-MCNC: 1.04 MG/DL (ref 0.2–1)
BILIRUB SERPL-MCNC: 1.07 MG/DL (ref 0.2–1)
BODY TEMPERATURE: 96.4 DEGREES FEHRENHEIT
BUN SERPL-MCNC: 6 MG/DL (ref 5–25)
BUN SERPL-MCNC: 7 MG/DL (ref 5–25)
CA-I BLD-SCNC: 1.07 MMOL/L (ref 1.12–1.32)
CALCIUM ALBUM COR SERPL-MCNC: 10.3 MG/DL (ref 8.3–10.1)
CALCIUM ALBUM COR SERPL-MCNC: 9.3 MG/DL (ref 8.3–10.1)
CALCIUM SERPL-MCNC: 8.1 MG/DL (ref 8.3–10.1)
CALCIUM SERPL-MCNC: 9 MG/DL (ref 8.3–10.1)
CHLORIDE SERPL-SCNC: 102 MMOL/L (ref 100–108)
CHLORIDE SERPL-SCNC: 104 MMOL/L (ref 100–108)
CO2 SERPL-SCNC: 26 MMOL/L (ref 21–32)
CO2 SERPL-SCNC: 26 MMOL/L (ref 21–32)
CREAT SERPL-MCNC: 0.62 MG/DL (ref 0.6–1.3)
CREAT SERPL-MCNC: 0.65 MG/DL (ref 0.6–1.3)
EOSINOPHIL # BLD AUTO: 0.02 THOUSAND/ΜL (ref 0–0.61)
EOSINOPHIL # BLD AUTO: 0.2 THOUSAND/ΜL (ref 0–0.61)
EOSINOPHIL NFR BLD AUTO: 0 % (ref 0–6)
EOSINOPHIL NFR BLD AUTO: 4 % (ref 0–6)
ERYTHROCYTE [DISTWIDTH] IN BLOOD BY AUTOMATED COUNT: 14.7 % (ref 11.6–15.1)
ERYTHROCYTE [DISTWIDTH] IN BLOOD BY AUTOMATED COUNT: 15 % (ref 11.6–15.1)
ERYTHROCYTE [DISTWIDTH] IN BLOOD BY AUTOMATED COUNT: 15.3 % (ref 11.6–15.1)
GFR SERPL CREATININE-BSD FRML MDRD: 112 ML/MIN/1.73SQ M
GFR SERPL CREATININE-BSD FRML MDRD: 114 ML/MIN/1.73SQ M
GLUCOSE SERPL-MCNC: 175 MG/DL (ref 65–140)
GLUCOSE SERPL-MCNC: 176 MG/DL (ref 65–140)
HCO3 BLDA-SCNC: 22.7 MMOL/L (ref 22–28)
HCT VFR BLD AUTO: 34 % (ref 36.5–49.3)
HCT VFR BLD AUTO: 34.9 % (ref 36.5–49.3)
HCT VFR BLD AUTO: 36.2 % (ref 36.5–49.3)
HGB BLD-MCNC: 10.9 G/DL (ref 12–17)
HGB BLD-MCNC: 11.4 G/DL (ref 12–17)
HGB BLD-MCNC: 11.7 G/DL (ref 12–17)
HOROWITZ INDEX BLDA+IHG-RTO: 70 MM[HG]
IMM GRANULOCYTES # BLD AUTO: 0.02 THOUSAND/UL (ref 0–0.2)
IMM GRANULOCYTES # BLD AUTO: 0.04 THOUSAND/UL (ref 0–0.2)
IMM GRANULOCYTES NFR BLD AUTO: 0 % (ref 0–2)
IMM GRANULOCYTES NFR BLD AUTO: 1 % (ref 0–2)
LYMPHOCYTES # BLD AUTO: 0.45 THOUSANDS/ΜL (ref 0.6–4.47)
LYMPHOCYTES # BLD AUTO: 0.86 THOUSANDS/ΜL (ref 0.6–4.47)
LYMPHOCYTES NFR BLD AUTO: 17 % (ref 14–44)
LYMPHOCYTES NFR BLD AUTO: 9 % (ref 14–44)
MAGNESIUM SERPL-MCNC: 1.7 MG/DL (ref 1.6–2.6)
MCH RBC QN AUTO: 29.8 PG (ref 26.8–34.3)
MCH RBC QN AUTO: 30.2 PG (ref 26.8–34.3)
MCH RBC QN AUTO: 31.1 PG (ref 26.8–34.3)
MCHC RBC AUTO-ENTMCNC: 32.1 G/DL (ref 31.4–37.4)
MCHC RBC AUTO-ENTMCNC: 32.3 G/DL (ref 31.4–37.4)
MCHC RBC AUTO-ENTMCNC: 32.7 G/DL (ref 31.4–37.4)
MCV RBC AUTO: 91 FL (ref 82–98)
MCV RBC AUTO: 94 FL (ref 82–98)
MCV RBC AUTO: 97 FL (ref 82–98)
MONOCYTES # BLD AUTO: 0.37 THOUSAND/ΜL (ref 0.17–1.22)
MONOCYTES # BLD AUTO: 0.44 THOUSAND/ΜL (ref 0.17–1.22)
MONOCYTES NFR BLD AUTO: 7 % (ref 4–12)
MONOCYTES NFR BLD AUTO: 9 % (ref 4–12)
NEUTROPHILS # BLD AUTO: 3.38 THOUSANDS/ΜL (ref 1.85–7.62)
NEUTROPHILS # BLD AUTO: 4.31 THOUSANDS/ΜL (ref 1.85–7.62)
NEUTS SEG NFR BLD AUTO: 69 % (ref 43–75)
NEUTS SEG NFR BLD AUTO: 84 % (ref 43–75)
NRBC BLD AUTO-RTO: 0 /100 WBCS
NRBC BLD AUTO-RTO: 0 /100 WBCS
O2 CT BLDA-SCNC: 17.4 ML/DL (ref 16–23)
OXYHGB MFR BLDA: 98.3 % (ref 94–97)
PCO2 BLDA: 36.9 MM HG (ref 36–44)
PCO2 TEMP ADJ BLDA: 35 MM HG (ref 36–44)
PEEP RESPIRATORY: 8 CM[H2O]
PH BLD: 7.42 [PH] (ref 7.35–7.45)
PH BLDA: 7.41 [PH] (ref 7.35–7.45)
PHOSPHATE SERPL-MCNC: 3.5 MG/DL (ref 2.7–4.5)
PLATELET # BLD AUTO: 157 THOUSANDS/UL (ref 149–390)
PLATELET # BLD AUTO: 162 THOUSANDS/UL (ref 149–390)
PLATELET # BLD AUTO: 168 THOUSANDS/UL (ref 149–390)
PMV BLD AUTO: 10 FL (ref 8.9–12.7)
PMV BLD AUTO: 10.1 FL (ref 8.9–12.7)
PMV BLD AUTO: 10.4 FL (ref 8.9–12.7)
PO2 BLD: 172.1 MM HG (ref 75–129)
PO2 BLDA: 178.5 MM HG (ref 75–129)
POTASSIUM SERPL-SCNC: 4.8 MMOL/L (ref 3.5–5.3)
POTASSIUM SERPL-SCNC: 4.9 MMOL/L (ref 3.5–5.3)
PROT SERPL-MCNC: 5.8 G/DL (ref 6.4–8.2)
PROT SERPL-MCNC: 5.9 G/DL (ref 6.4–8.2)
RBC # BLD AUTO: 3.51 MILLION/UL (ref 3.88–5.62)
RBC # BLD AUTO: 3.83 MILLION/UL (ref 3.88–5.62)
RBC # BLD AUTO: 3.87 MILLION/UL (ref 3.88–5.62)
SODIUM SERPL-SCNC: 135 MMOL/L (ref 136–145)
SODIUM SERPL-SCNC: 138 MMOL/L (ref 136–145)
SPECIMEN SOURCE: ABNORMAL
VENT AC: 18
VENT- AC: AC
VT SETTING VENT: 490 ML
WBC # BLD AUTO: 4.93 THOUSAND/UL (ref 4.31–10.16)
WBC # BLD AUTO: 5.18 THOUSAND/UL (ref 4.31–10.16)
WBC # BLD AUTO: 9.04 THOUSAND/UL (ref 4.31–10.16)

## 2022-06-14 PROCEDURE — 85025 COMPLETE CBC W/AUTO DIFF WBC: CPT | Performed by: INTERNAL MEDICINE

## 2022-06-14 PROCEDURE — 5A1955Z RESPIRATORY VENTILATION, GREATER THAN 96 CONSECUTIVE HOURS: ICD-10-PCS | Performed by: SURGERY

## 2022-06-14 PROCEDURE — 99291 CRITICAL CARE FIRST HOUR: CPT | Performed by: SURGERY

## 2022-06-14 PROCEDURE — NC001 PR NO CHARGE: Performed by: SURGERY

## 2022-06-14 PROCEDURE — 97605 NEG PRS WND THER DME<=50SQCM: CPT | Performed by: PHYSICIAN ASSISTANT

## 2022-06-14 PROCEDURE — 80053 COMPREHEN METABOLIC PANEL: CPT | Performed by: EMERGENCY MEDICINE

## 2022-06-14 PROCEDURE — 94760 N-INVAS EAR/PLS OXIMETRY 1: CPT

## 2022-06-14 PROCEDURE — 94002 VENT MGMT INPAT INIT DAY: CPT

## 2022-06-14 PROCEDURE — 03HY32Z INSERTION OF MONITORING DEVICE INTO UPPER ARTERY, PERCUTANEOUS APPROACH: ICD-10-PCS | Performed by: ANESTHESIOLOGY

## 2022-06-14 PROCEDURE — 97605 NEG PRS WND THER DME<=50SQCM: CPT | Performed by: SURGERY

## 2022-06-14 PROCEDURE — 0DN80ZZ RELEASE SMALL INTESTINE, OPEN APPROACH: ICD-10-PCS | Performed by: SURGERY

## 2022-06-14 PROCEDURE — 82805 BLOOD GASES W/O2 SATURATION: CPT | Performed by: INTERNAL MEDICINE

## 2022-06-14 PROCEDURE — 83735 ASSAY OF MAGNESIUM: CPT | Performed by: INTERNAL MEDICINE

## 2022-06-14 PROCEDURE — 85027 COMPLETE CBC AUTOMATED: CPT | Performed by: EMERGENCY MEDICINE

## 2022-06-14 PROCEDURE — C1781 MESH (IMPLANTABLE): HCPCS | Performed by: SURGERY

## 2022-06-14 PROCEDURE — 80053 COMPREHEN METABOLIC PANEL: CPT | Performed by: INTERNAL MEDICINE

## 2022-06-14 PROCEDURE — NC001 PR NO CHARGE: Performed by: INTERNAL MEDICINE

## 2022-06-14 PROCEDURE — C9113 INJ PANTOPRAZOLE SODIUM, VIA: HCPCS | Performed by: INTERNAL MEDICINE

## 2022-06-14 PROCEDURE — 44005 FREEING OF BOWEL ADHESION: CPT | Performed by: SURGERY

## 2022-06-14 PROCEDURE — 84100 ASSAY OF PHOSPHORUS: CPT | Performed by: INTERNAL MEDICINE

## 2022-06-14 PROCEDURE — 85730 THROMBOPLASTIN TIME PARTIAL: CPT | Performed by: INTERNAL MEDICINE

## 2022-06-14 PROCEDURE — 94640 AIRWAY INHALATION TREATMENT: CPT

## 2022-06-14 PROCEDURE — 44005 FREEING OF BOWEL ADHESION: CPT | Performed by: PHYSICIAN ASSISTANT

## 2022-06-14 PROCEDURE — 99238 HOSP IP/OBS DSCHRG MGMT 30/<: CPT | Performed by: INTERNAL MEDICINE

## 2022-06-14 PROCEDURE — 4A133B1 MONITORING OF ARTERIAL PRESSURE, PERIPHERAL, PERCUTANEOUS APPROACH: ICD-10-PCS | Performed by: ANESTHESIOLOGY

## 2022-06-14 PROCEDURE — 05HM33Z INSERTION OF INFUSION DEVICE INTO RIGHT INTERNAL JUGULAR VEIN, PERCUTANEOUS APPROACH: ICD-10-PCS | Performed by: ANESTHESIOLOGY

## 2022-06-14 PROCEDURE — 82330 ASSAY OF CALCIUM: CPT | Performed by: NURSE PRACTITIONER

## 2022-06-14 PROCEDURE — 4A133J1 MONITORING OF ARTERIAL PULSE, PERIPHERAL, PERCUTANEOUS APPROACH: ICD-10-PCS | Performed by: ANESTHESIOLOGY

## 2022-06-14 RX ORDER — MAGNESIUM SULFATE HEPTAHYDRATE 40 MG/ML
2 INJECTION, SOLUTION INTRAVENOUS ONCE
Status: COMPLETED | OUTPATIENT
Start: 2022-06-14 | End: 2022-06-14

## 2022-06-14 RX ORDER — ALBUMIN, HUMAN INJ 5% 5 %
SOLUTION INTRAVENOUS CONTINUOUS PRN
Status: DISCONTINUED | OUTPATIENT
Start: 2022-06-14 | End: 2022-06-14

## 2022-06-14 RX ORDER — MINERAL OIL AND PETROLATUM 150; 830 MG/G; MG/G
OINTMENT OPHTHALMIC
Status: DISCONTINUED | OUTPATIENT
Start: 2022-06-14 | End: 2022-06-14 | Stop reason: HOSPADM

## 2022-06-14 RX ORDER — METRONIDAZOLE 500 MG/100ML
500 INJECTION, SOLUTION INTRAVENOUS
Status: COMPLETED | OUTPATIENT
Start: 2022-06-15 | End: 2022-06-15

## 2022-06-14 RX ORDER — MAGNESIUM SULFATE 1 G/100ML
1 INJECTION INTRAVENOUS ONCE
Status: DISCONTINUED | OUTPATIENT
Start: 2022-06-14 | End: 2022-06-14

## 2022-06-14 RX ORDER — PROPOFOL 10 MG/ML
INJECTION, EMULSION INTRAVENOUS AS NEEDED
Status: DISCONTINUED | OUTPATIENT
Start: 2022-06-14 | End: 2022-06-14

## 2022-06-14 RX ORDER — PANTOPRAZOLE SODIUM 40 MG/10ML
40 INJECTION, POWDER, LYOPHILIZED, FOR SOLUTION INTRAVENOUS
Status: DISCONTINUED | OUTPATIENT
Start: 2022-06-15 | End: 2022-06-21

## 2022-06-14 RX ORDER — METRONIDAZOLE 500 MG/100ML
500 INJECTION, SOLUTION INTRAVENOUS EVERY 8 HOURS
Status: DISCONTINUED | OUTPATIENT
Start: 2022-06-14 | End: 2022-06-14

## 2022-06-14 RX ORDER — LEVOFLOXACIN 5 MG/ML
750 INJECTION, SOLUTION INTRAVENOUS EVERY 24 HOURS
Status: DISCONTINUED | OUTPATIENT
Start: 2022-06-14 | End: 2022-06-14

## 2022-06-14 RX ORDER — FENTANYL CITRATE 50 UG/ML
50 INJECTION, SOLUTION INTRAMUSCULAR; INTRAVENOUS
Status: DISCONTINUED | OUTPATIENT
Start: 2022-06-14 | End: 2022-06-16

## 2022-06-14 RX ORDER — SODIUM CHLORIDE 9 MG/ML
INJECTION, SOLUTION INTRAVENOUS CONTINUOUS PRN
Status: DISCONTINUED | OUTPATIENT
Start: 2022-06-14 | End: 2022-06-14

## 2022-06-14 RX ORDER — ROCURONIUM BROMIDE 10 MG/ML
INJECTION, SOLUTION INTRAVENOUS AS NEEDED
Status: DISCONTINUED | OUTPATIENT
Start: 2022-06-14 | End: 2022-06-14

## 2022-06-14 RX ORDER — FENTANYL CITRATE-0.9 % NACL/PF 10 MCG/ML
100 PLASTIC BAG, INJECTION (ML) INTRAVENOUS CONTINUOUS
Status: CANCELLED | OUTPATIENT
Start: 2022-06-14

## 2022-06-14 RX ORDER — HEPARIN SODIUM 5000 [USP'U]/ML
5000 INJECTION, SOLUTION INTRAVENOUS; SUBCUTANEOUS EVERY 8 HOURS SCHEDULED
Status: DISCONTINUED | OUTPATIENT
Start: 2022-06-14 | End: 2022-06-14

## 2022-06-14 RX ORDER — LIDOCAINE HYDROCHLORIDE 20 MG/ML
INJECTION, SOLUTION EPIDURAL; INFILTRATION; INTRACAUDAL; PERINEURAL AS NEEDED
Status: DISCONTINUED | OUTPATIENT
Start: 2022-06-14 | End: 2022-06-14

## 2022-06-14 RX ORDER — MINERAL OIL AND PETROLATUM 150; 830 MG/G; MG/G
OINTMENT OPHTHALMIC
Status: CANCELLED | OUTPATIENT
Start: 2022-06-14

## 2022-06-14 RX ORDER — INSULIN LISPRO 100 [IU]/ML
1-5 INJECTION, SOLUTION INTRAVENOUS; SUBCUTANEOUS EVERY 6 HOURS SCHEDULED
Status: CANCELLED | OUTPATIENT
Start: 2022-06-14

## 2022-06-14 RX ORDER — SUCCINYLCHOLINE/SOD CL,ISO/PF 100 MG/5ML
SYRINGE (ML) INTRAVENOUS AS NEEDED
Status: DISCONTINUED | OUTPATIENT
Start: 2022-06-14 | End: 2022-06-14

## 2022-06-14 RX ORDER — LEVOFLOXACIN 5 MG/ML
750 INJECTION, SOLUTION INTRAVENOUS EVERY 24 HOURS
Status: DISCONTINUED | OUTPATIENT
Start: 2022-06-15 | End: 2022-06-14

## 2022-06-14 RX ORDER — GLYCOPYRROLATE 0.2 MG/ML
INJECTION INTRAMUSCULAR; INTRAVENOUS AS NEEDED
Status: DISCONTINUED | OUTPATIENT
Start: 2022-06-14 | End: 2022-06-14

## 2022-06-14 RX ORDER — CHLORHEXIDINE GLUCONATE 0.12 MG/ML
15 RINSE ORAL EVERY 12 HOURS SCHEDULED
Status: CANCELLED | OUTPATIENT
Start: 2022-06-14

## 2022-06-14 RX ORDER — PROPOFOL 10 MG/ML
5-80 INJECTION, EMULSION INTRAVENOUS
Status: CANCELLED | OUTPATIENT
Start: 2022-06-14

## 2022-06-14 RX ORDER — FENTANYL CITRATE-0.9 % NACL/PF 10 MCG/ML
100 PLASTIC BAG, INJECTION (ML) INTRAVENOUS CONTINUOUS
Status: DISCONTINUED | OUTPATIENT
Start: 2022-06-14 | End: 2022-06-14 | Stop reason: SDUPTHER

## 2022-06-14 RX ORDER — DEXAMETHASONE SODIUM PHOSPHATE 4 MG/ML
INJECTION, SOLUTION INTRA-ARTICULAR; INTRALESIONAL; INTRAMUSCULAR; INTRAVENOUS; SOFT TISSUE AS NEEDED
Status: DISCONTINUED | OUTPATIENT
Start: 2022-06-14 | End: 2022-06-14

## 2022-06-14 RX ORDER — SODIUM CHLORIDE, SODIUM GLUCONATE, SODIUM ACETATE, POTASSIUM CHLORIDE, MAGNESIUM CHLORIDE, SODIUM PHOSPHATE, DIBASIC, AND POTASSIUM PHOSPHATE .53; .5; .37; .037; .03; .012; .00082 G/100ML; G/100ML; G/100ML; G/100ML; G/100ML; G/100ML; G/100ML
100 INJECTION, SOLUTION INTRAVENOUS CONTINUOUS
Status: CANCELLED | OUTPATIENT
Start: 2022-06-14

## 2022-06-14 RX ORDER — SODIUM CHLORIDE, SODIUM GLUCONATE, SODIUM ACETATE, POTASSIUM CHLORIDE, MAGNESIUM CHLORIDE, SODIUM PHOSPHATE, DIBASIC, AND POTASSIUM PHOSPHATE .53; .5; .37; .037; .03; .012; .00082 G/100ML; G/100ML; G/100ML; G/100ML; G/100ML; G/100ML; G/100ML
100 INJECTION, SOLUTION INTRAVENOUS CONTINUOUS
Status: DISCONTINUED | OUTPATIENT
Start: 2022-06-14 | End: 2022-06-14 | Stop reason: HOSPADM

## 2022-06-14 RX ORDER — LEVALBUTEROL 1.25 MG/.5ML
1.25 SOLUTION, CONCENTRATE RESPIRATORY (INHALATION)
Status: DISCONTINUED | OUTPATIENT
Start: 2022-06-14 | End: 2022-07-11

## 2022-06-14 RX ORDER — FENTANYL CITRATE 50 UG/ML
100 INJECTION, SOLUTION INTRAMUSCULAR; INTRAVENOUS EVERY 2 HOUR PRN
Status: DISCONTINUED | OUTPATIENT
Start: 2022-06-14 | End: 2022-06-14 | Stop reason: HOSPADM

## 2022-06-14 RX ORDER — CHLORHEXIDINE GLUCONATE 0.12 MG/ML
15 RINSE ORAL EVERY 12 HOURS SCHEDULED
Status: DISCONTINUED | OUTPATIENT
Start: 2022-06-14 | End: 2022-06-21

## 2022-06-14 RX ORDER — PROPOFOL 10 MG/ML
5-50 INJECTION, EMULSION INTRAVENOUS
Status: DISCONTINUED | OUTPATIENT
Start: 2022-06-14 | End: 2022-06-18

## 2022-06-14 RX ORDER — HEPARIN SODIUM 5000 [USP'U]/ML
7500 INJECTION, SOLUTION INTRAVENOUS; SUBCUTANEOUS EVERY 8 HOURS SCHEDULED
Status: DISCONTINUED | OUTPATIENT
Start: 2022-06-14 | End: 2022-06-14

## 2022-06-14 RX ORDER — PANTOPRAZOLE SODIUM 40 MG/1
40 INJECTION, POWDER, FOR SOLUTION INTRAVENOUS
Status: CANCELLED | OUTPATIENT
Start: 2022-06-15

## 2022-06-14 RX ORDER — MIDAZOLAM HYDROCHLORIDE 2 MG/2ML
INJECTION, SOLUTION INTRAMUSCULAR; INTRAVENOUS AS NEEDED
Status: DISCONTINUED | OUTPATIENT
Start: 2022-06-14 | End: 2022-06-14

## 2022-06-14 RX ORDER — FENTANYL CITRATE 50 UG/ML
100 INJECTION, SOLUTION INTRAMUSCULAR; INTRAVENOUS EVERY 2 HOUR PRN
Status: CANCELLED | OUTPATIENT
Start: 2022-06-14

## 2022-06-14 RX ORDER — SODIUM CHLORIDE, SODIUM GLUCONATE, SODIUM ACETATE, POTASSIUM CHLORIDE, MAGNESIUM CHLORIDE, SODIUM PHOSPHATE, DIBASIC, AND POTASSIUM PHOSPHATE .53; .5; .37; .037; .03; .012; .00082 G/100ML; G/100ML; G/100ML; G/100ML; G/100ML; G/100ML; G/100ML
125 INJECTION, SOLUTION INTRAVENOUS CONTINUOUS
Status: DISCONTINUED | OUTPATIENT
Start: 2022-06-14 | End: 2022-06-15

## 2022-06-14 RX ORDER — MAGNESIUM HYDROXIDE 1200 MG/15ML
LIQUID ORAL AS NEEDED
Status: DISCONTINUED | OUTPATIENT
Start: 2022-06-14 | End: 2022-06-14 | Stop reason: HOSPADM

## 2022-06-14 RX ORDER — FENTANYL CITRATE-0.9 % NACL/PF 10 MCG/ML
100 PLASTIC BAG, INJECTION (ML) INTRAVENOUS CONTINUOUS
Status: DISCONTINUED | OUTPATIENT
Start: 2022-06-14 | End: 2022-06-14 | Stop reason: HOSPADM

## 2022-06-14 RX ORDER — INSULIN LISPRO 100 [IU]/ML
1-5 INJECTION, SOLUTION INTRAVENOUS; SUBCUTANEOUS EVERY 6 HOURS SCHEDULED
Status: DISCONTINUED | OUTPATIENT
Start: 2022-06-14 | End: 2022-07-11

## 2022-06-14 RX ORDER — FENTANYL CITRATE-0.9 % NACL/PF 10 MCG/ML
100 PLASTIC BAG, INJECTION (ML) INTRAVENOUS CONTINUOUS
Status: DISCONTINUED | OUTPATIENT
Start: 2022-06-14 | End: 2022-06-19

## 2022-06-14 RX ORDER — PROPOFOL 10 MG/ML
5-80 INJECTION, EMULSION INTRAVENOUS
Status: DISCONTINUED | OUTPATIENT
Start: 2022-06-14 | End: 2022-06-14 | Stop reason: HOSPADM

## 2022-06-14 RX ORDER — PANTOPRAZOLE SODIUM 40 MG/1
40 INJECTION, POWDER, FOR SOLUTION INTRAVENOUS
Status: DISCONTINUED | OUTPATIENT
Start: 2022-06-14 | End: 2022-06-14 | Stop reason: HOSPADM

## 2022-06-14 RX ORDER — HEPARIN SODIUM 5000 [USP'U]/ML
5000 INJECTION, SOLUTION INTRAVENOUS; SUBCUTANEOUS EVERY 8 HOURS SCHEDULED
Status: CANCELLED | OUTPATIENT
Start: 2022-06-14

## 2022-06-14 RX ORDER — FENTANYL CITRATE 50 UG/ML
INJECTION, SOLUTION INTRAMUSCULAR; INTRAVENOUS AS NEEDED
Status: DISCONTINUED | OUTPATIENT
Start: 2022-06-14 | End: 2022-06-14

## 2022-06-14 RX ORDER — CHLORHEXIDINE GLUCONATE 0.12 MG/ML
15 RINSE ORAL EVERY 12 HOURS SCHEDULED
Status: DISCONTINUED | OUTPATIENT
Start: 2022-06-14 | End: 2022-06-14 | Stop reason: HOSPADM

## 2022-06-14 RX ORDER — HEPARIN SODIUM 5000 [USP'U]/ML
5000 INJECTION, SOLUTION INTRAVENOUS; SUBCUTANEOUS EVERY 8 HOURS SCHEDULED
Status: DISCONTINUED | OUTPATIENT
Start: 2022-06-14 | End: 2022-06-14 | Stop reason: HOSPADM

## 2022-06-14 RX ORDER — PROPOFOL 10 MG/ML
5-50 INJECTION, EMULSION INTRAVENOUS
Status: DISCONTINUED | OUTPATIENT
Start: 2022-06-14 | End: 2022-06-14

## 2022-06-14 RX ORDER — SODIUM CHLORIDE, SODIUM LACTATE, POTASSIUM CHLORIDE, CALCIUM CHLORIDE 600; 310; 30; 20 MG/100ML; MG/100ML; MG/100ML; MG/100ML
INJECTION, SOLUTION INTRAVENOUS CONTINUOUS PRN
Status: DISCONTINUED | OUTPATIENT
Start: 2022-06-14 | End: 2022-06-14

## 2022-06-14 RX ADMIN — SODIUM CHLORIDE, SODIUM GLUCONATE, SODIUM ACETATE, POTASSIUM CHLORIDE, MAGNESIUM CHLORIDE, SODIUM PHOSPHATE, DIBASIC, AND POTASSIUM PHOSPHATE 100 ML/HR: .53; .5; .37; .037; .03; .012; .00082 INJECTION, SOLUTION INTRAVENOUS at 13:58

## 2022-06-14 RX ADMIN — MINERAL OIL AND PETROLATUM: 150; 830 OINTMENT OPHTHALMIC at 15:37

## 2022-06-14 RX ADMIN — LEVALBUTEROL HYDROCHLORIDE 1.25 MG: 1.25 SOLUTION, CONCENTRATE RESPIRATORY (INHALATION) at 19:31

## 2022-06-14 RX ADMIN — ROCURONIUM BROMIDE 50 MG: 50 INJECTION, SOLUTION INTRAVENOUS at 12:14

## 2022-06-14 RX ADMIN — Medication 200 MG: at 08:18

## 2022-06-14 RX ADMIN — CHLORHEXIDINE GLUCONATE 15 ML: 1.2 RINSE ORAL at 14:14

## 2022-06-14 RX ADMIN — PANTOPRAZOLE SODIUM 40 MG: 40 INJECTION, POWDER, FOR SOLUTION INTRAVENOUS at 13:58

## 2022-06-14 RX ADMIN — FENTANYL CITRATE 50 MCG: 50 INJECTION INTRAMUSCULAR; INTRAVENOUS at 09:12

## 2022-06-14 RX ADMIN — ONDANSETRON 4 MG: 2 INJECTION INTRAMUSCULAR; INTRAVENOUS at 06:55

## 2022-06-14 RX ADMIN — MAGNESIUM SULFATE HEPTAHYDRATE 2 G: 40 INJECTION, SOLUTION INTRAVENOUS at 15:29

## 2022-06-14 RX ADMIN — PROPOFOL 50 MCG/KG/MIN: 10 INJECTION, EMULSION INTRAVENOUS at 13:18

## 2022-06-14 RX ADMIN — PROPOFOL 80 MCG/KG/MIN: 10 INJECTION, EMULSION INTRAVENOUS at 16:16

## 2022-06-14 RX ADMIN — METRONIDAZOLE: 500 INJECTION, SOLUTION INTRAVENOUS at 08:55

## 2022-06-14 RX ADMIN — PROPOFOL 200 MG: 10 INJECTION, EMULSION INTRAVENOUS at 08:17

## 2022-06-14 RX ADMIN — FENTANYL CITRATE 50 MCG: 50 INJECTION INTRAMUSCULAR; INTRAVENOUS at 08:17

## 2022-06-14 RX ADMIN — LEVOFLOXACIN: 5 INJECTION, SOLUTION INTRAVENOUS at 08:21

## 2022-06-14 RX ADMIN — LIDOCAINE HYDROCHLORIDE 100 MG: 20 INJECTION, SOLUTION EPIDURAL; INFILTRATION; INTRACAUDAL at 08:17

## 2022-06-14 RX ADMIN — PROPOFOL 50 MCG/KG/MIN: 10 INJECTION, EMULSION INTRAVENOUS at 21:11

## 2022-06-14 RX ADMIN — MORPHINE SULFATE 2 MG: 2 INJECTION, SOLUTION INTRAMUSCULAR; INTRAVENOUS at 04:43

## 2022-06-14 RX ADMIN — ROCURONIUM BROMIDE 50 MG: 50 INJECTION, SOLUTION INTRAVENOUS at 08:21

## 2022-06-14 RX ADMIN — HEPARIN SODIUM 7500 UNITS: 5000 INJECTION INTRAVENOUS; SUBCUTANEOUS at 21:42

## 2022-06-14 RX ADMIN — PROPOFOL 80 MCG/KG/MIN: 10 INJECTION, EMULSION INTRAVENOUS at 15:25

## 2022-06-14 RX ADMIN — ROCURONIUM BROMIDE 50 MG: 50 INJECTION, SOLUTION INTRAVENOUS at 08:37

## 2022-06-14 RX ADMIN — FENTANYL CITRATE 100 MCG/HR: 50 INJECTION, SOLUTION INTRAMUSCULAR; INTRAVENOUS at 14:01

## 2022-06-14 RX ADMIN — PROPOFOL 50 MCG/KG/MIN: 10 INJECTION, EMULSION INTRAVENOUS at 18:17

## 2022-06-14 RX ADMIN — PROPOFOL 50 MCG/KG/MIN: 10 INJECTION, EMULSION INTRAVENOUS at 19:46

## 2022-06-14 RX ADMIN — DEXAMETHASONE SODIUM PHOSPHATE 8 MG: 4 INJECTION INTRA-ARTICULAR; INTRALESIONAL; INTRAMUSCULAR; INTRAVENOUS; SOFT TISSUE at 08:25

## 2022-06-14 RX ADMIN — MIDAZOLAM 2 MG: 1 INJECTION INTRAMUSCULAR; INTRAVENOUS at 07:58

## 2022-06-14 RX ADMIN — PROPOFOL 80 MCG/KG/MIN: 10 INJECTION, EMULSION INTRAVENOUS at 14:30

## 2022-06-14 RX ADMIN — DILTIAZEM HYDROCHLORIDE 15 MG/HR: 5 INJECTION INTRAVENOUS at 06:55

## 2022-06-14 RX ADMIN — ROCURONIUM BROMIDE 20 MG: 50 INJECTION, SOLUTION INTRAVENOUS at 10:49

## 2022-06-14 RX ADMIN — DILTIAZEM HYDROCHLORIDE 10 MG/HR: 5 INJECTION INTRAVENOUS at 15:21

## 2022-06-14 RX ADMIN — SODIUM CHLORIDE, SODIUM LACTATE, POTASSIUM CHLORIDE, AND CALCIUM CHLORIDE: .6; .31; .03; .02 INJECTION, SOLUTION INTRAVENOUS at 08:05

## 2022-06-14 RX ADMIN — CHLORHEXIDINE GLUCONATE 15 ML: 1.2 SOLUTION ORAL at 21:42

## 2022-06-14 RX ADMIN — MINERAL OIL AND PETROLATUM: 150; 830 OINTMENT OPHTHALMIC at 14:14

## 2022-06-14 RX ADMIN — IPRATROPIUM BROMIDE 0.5 MG: 0.5 SOLUTION RESPIRATORY (INHALATION) at 19:31

## 2022-06-14 RX ADMIN — FENTANYL CITRATE 100 MCG: 50 INJECTION INTRAMUSCULAR; INTRAVENOUS at 12:14

## 2022-06-14 RX ADMIN — SODIUM CHLORIDE, SODIUM LACTATE, POTASSIUM CHLORIDE, AND CALCIUM CHLORIDE: .6; .31; .03; .02 INJECTION, SOLUTION INTRAVENOUS at 10:10

## 2022-06-14 RX ADMIN — SODIUM CHLORIDE, SODIUM GLUCONATE, SODIUM ACETATE, POTASSIUM CHLORIDE, MAGNESIUM CHLORIDE, SODIUM PHOSPHATE, DIBASIC, AND POTASSIUM PHOSPHATE 125 ML/HR: .53; .5; .37; .037; .03; .012; .00082 INJECTION, SOLUTION INTRAVENOUS at 21:42

## 2022-06-14 RX ADMIN — SODIUM CHLORIDE: 0.9 INJECTION, SOLUTION INTRAVENOUS at 11:19

## 2022-06-14 RX ADMIN — SODIUM CHLORIDE: 0.9 INJECTION, SOLUTION INTRAVENOUS at 08:25

## 2022-06-14 RX ADMIN — ALBUMIN (HUMAN): 12.5 INJECTION, SOLUTION INTRAVENOUS at 09:26

## 2022-06-14 RX ADMIN — DILTIAZEM HYDROCHLORIDE 10 MG/HR: 5 INJECTION INTRAVENOUS at 13:47

## 2022-06-14 RX ADMIN — PROPOFOL 45 MCG/KG/MIN: 10 INJECTION, EMULSION INTRAVENOUS at 22:44

## 2022-06-14 RX ADMIN — ROCURONIUM BROMIDE 30 MG: 50 INJECTION, SOLUTION INTRAVENOUS at 09:33

## 2022-06-14 RX ADMIN — GLYCOPYRROLATE 0.1 MG: 0.2 INJECTION, SOLUTION INTRAMUSCULAR; INTRAVENOUS at 08:14

## 2022-06-14 RX ADMIN — CISATRACURIUM BESYLATE 0.1 MCG/KG/MIN: 200 INJECTION INTRAVENOUS at 14:07

## 2022-06-14 RX ADMIN — ALBUMIN (HUMAN): 12.5 INJECTION, SOLUTION INTRAVENOUS at 09:38

## 2022-06-14 RX ADMIN — PHENYLEPHRINE HYDROCHLORIDE 50 MCG/MIN: 10 INJECTION INTRAVENOUS at 08:45

## 2022-06-14 NOTE — EMTALA/ACUTE CARE TRANSFER
[facility] INTENSIVE CARE  UNIT  [address]  [place] [address]  Dept: [phone]      ACUTE CARE TRANSFER CONSENT    NAME Darien ELIZABETH 1970                              MRN [id number]    I have been informed of my rights regarding examination, treatment, and transfer   by Dr Carlin Liang DO    Benefits:  Higher level of care, plastic surgery consultation for reconstruction     Risks:  hemodynamic decompensation, bleeding, infection       Transfer Request:  I acknowledge that my medical condition has been evaluated and explained to me by the treating physician or other qualified medical person and/or my attending physician who has recommended and offered to me further medical examination and treatment  I understand the Hospital's obligation with respect to the treatment and stabilization of my medical condition  I nevertheless request to be transferred  I release the Hospital, the doctor, and any other persons caring for me from all responsibility or liability for any injury or ill effects that may result from my transfer and agree to accept all responsibility for the consequences of my choice to transfer, rather than receive stabilizing treatment at the Hospital  I understand that because the transfer is my request, my insurance may not provide reimbursement for the services  The Hospital will assist and direct me and my family in how to make arrangements for transfer, but the hospital is not liable for any fees charged by the transport service  In spite of this understanding, I refuse to consent to further medical examination and treatment which has been offered to me, and request transfer to    I authorize the performance of emergency medical procedures and treatments upon me in both transit and upon arrival at the receiving facility    Additionally, I authorize the release of any and all medical records to the receiving facility and request they be transported with me, if possible  I authorize the performance of emergency medical procedures and treatments upon me in both transit and upon arrival at the receiving facility  Additionally, I authorize the release of any and all medical records to the receiving facility and request they be transported with me, if possible  I understand that the safest mode of transportation during a medical emergency is an ambulance and that the Hospital advocates the use of this mode of transport  Risks of traveling to the receiving facility by car, including absence of medical control, life sustaining equipment, such as oxygen, and medical personnel has been explained to me and I fully understand them  (BONITA CORRECT BOX BELOW)  Tag Severance  ]  I consent to the stated transfer and to be transported by ambulance/helicopter  [  ]  I consent to the stated transfer, but refuse transportation by ambulance and accept full responsibility for my transportation by car  I understand the risks of non-ambulance transfers and I exonerate the Hospital and its staff from any deterioration in my condition that results from this refusal     X______ELISEO MAN (Telephonic Consent)_____________________________________    DATE  22  TIME___3:56 pm____  Signature of patient or legally responsible individual signing on patient behalf           RELATIONSHIP TO PATIENT______Significant Other___________________          Provider Certification    NAME Annitta Cogan                                        [facility] 1970                              MRN [id number]    A medical screening exam was performed on the above named patient    Based on the examination:    Condition Necessitating Transfer SBO s/p exploratory laparotomy -- needs higher level of care, for plastic surgery consult for reconstruction, wound washout     Patient Condition:  Intubated, sedated and paralyzed     Reason for Transfer:   SBO s/p exploratory laparotomy -- needs higher level of care, for plastic surgery consult for reconstruction, wound washout     Transfer Requirements: Facility     · Space available and qualified personnel available for treatment as acknowledged by    · Agreed to accept transfer and to provide appropriate medical treatment as acknowledged by          · Appropriate medical records of the examination and treatment of the patient are provided at the time of transfer   [address] [facility], [address] ____PV___  · Transfer will be performed by qualified personnel from    and appropriate transfer equipment as required, including the use of necessary and appropriate life support measures  Provider Certification: I have examined the patient and explained the following risks and benefits of being transferred/refusing transfer to the patient/family:         Based on these reasonable risks and benefits to the patient and/or the unborn child(margarita), and based upon the information available at the time of the patients examination, I certify that the medical benefits reasonably to be expected from the provision of appropriate medical treatments at another medical facility outweigh the increasing risks, if any, to the individuals medical condition, and in the case of labor to the unborn child, from effecting the transfer      X________[facility] MD ____________________________________ DATE 06/14/22        TIME_______      ORIGINAL - SEND TO MEDICAL RECORDS   COPY - SEND WITH PATIENT DURING TRANSFER

## 2022-06-14 NOTE — PROGRESS NOTES
[address]  Progress Note - Candelaria Bell 1970, 47 y o  male MRN: [id number]  Unit/Bed#: ICU 08 Encounter: [id number]  Primary Care Provider: Zafar Stroud MD   Date and time admitted to hospital: 6/6/2022  8:24 PM    * SBO  Assessment & Plan  52-year-old male morbidly obese with history of bariatric surgery presented to [facility] for abdominal pain and transferred to [facility] for surgical evaluation  Patient initially had a due to which was discontinued  He remains NPO due to recurrence of symptoms  Unable to obtain CT scan at current facility due to his weight  Pt given  Levaquin and Flagyl prior to surgery     Patient is now status S/POD0 exploratory laparotomy  Pt is currently intubated and is stable  Patient will be transferred to [facility] on 06/15/2022 for wound vac removal and  plastic surgery     Morbid obesity [facility])  Assessment & Plan  Patient is s/p bariatric surgery( gastric sleeve) but gained significant amount of weight  Currently resolved on nursing facility  BMI 73 22 kg/m2  Patient is well known to Dr Brittney Flores who has done operations in the past    Atrial fibrillation with rapid ventricular response [facility])  Assessment & Plan  Atrial Fibrillation prior to admission  Patient previously metoprolol 100 mg b i d  And Pradaxa    · Patient remains NPO  · S/p exploratory laparotomy secondary to SBO   · Telemetry  · Continue diltiazem and heparinGTT      Chronic diastolic heart failure (HCC)  Assessment & Plan  Wt Readings from Last 3 Encounters:   06/14/22 (!) 238 kg (525 lb)   06/06/22 (!) 226 kg (498 lb 3 8 oz)   06/02/22 (!) 232 kg (512 lb)       · Prior to admission, patient was on torsemide and Aldactone  · No evidence of acute decompensation  · Last echo 04/2022 with EF of 55%  · Will continue to monitor    History of DVT (deep vein thrombosis)  Assessment & Plan  Patient was previously on Pradaxa prior to admission    Prior to surgery, patient was on heparin drip  Patient also has IVC filter which was unable to be retrieved    Heparin drip will be resumed 6 hours after surgery    Depression  Assessment & Plan  Stable    Will be resumed after patient is extubated    Hypothyroidism  Assessment & Plan  Stable    TSH 1 78   Continue levothyroxine     COPD (chronic obstructive pulmonary disease) ([facility])  Assessment & Plan  Stable     Patient currently intubated    Obesity hypoventilation syndrome [facility])  Assessment & Plan  Patient currently intubated s/p surgery    ----------------------------------------------------------------------------------------  HPI/24hr events:  47 yr old M presented with complaint of abdominal pain and found to have SBO requiring Ex Lap  Prior to surgery, pt was on heparin drip which was discontinued 24 hours before surgery  Pt was also given 1 dose of levaquin and flagyl in the morning  Pt underwent exploratory laparotomy to relieve the obstruction  During surgery, a large fascial defect was noted so an AbThera wound vac ws placed  Pt was then kept intubated and transferred to the ICU for high level of care and kept sedated on fentanyl and propofol  Per Dr Clare Melendez pt needs to remain paralyzed and sedated for bowel rest  He will need to be transferred to [facility] for plastic surgery care for reconstruction       Patient appropriate for transfer out of the ICU today?: No  Disposition: Continue Critical Care   Code Status: Level 1 - Full Code  ---------------------------------------------------------------------------------------  SUBJECTIVE  Pt is intubated    Review of Systems  Review of systems was reviewed and negative unless stated above in HPI/24-hour events   ---------------------------------------------------------------------------------------  OBJECTIVE    Vitals   Vitals:    06/14/22 1234 06/14/22 1330 06/14/22 1344 06/14/22 1400   BP: 165/88 119/65  101/74   BP Location:  Right arm  Right arm   Pulse: 83 91  90   Resp: (!) 26  22   Temp: 98 4 °F (36 9 °C) (!) 96 3 °F (35 7 °C)  (!) 96 44 °F (35 8 °C)   TempSrc:  Esophageal  Esophageal   SpO2: 98% 99% 99% 99%   Weight:       Height:         Temp (24hrs), Av 8 °F (36 6 °C), Min:96 3 °F (35 7 °C), Max:99 5 °F (37 5 °C)  Current: Temperature: (!) 96 44 °F (35 8 °C)  Arterial Line BP: 124/57  Arterial Line MAP (mmHg): 74 mmHg    Respiratory:  SpO2: SpO2: 99 %  Nasal Cannula O2 Flow Rate (L/min): 2 L/min    Invasive/non-invasive ventilation settings   Respiratory  Report   Lab Data (Last 4 hours)       1354            pH, Arterial       7 406             pCO2, Arterial       36 9             pO2, Arterial       178 5             HCO3, Arterial       22 7             Base Excess, Arterial       -1 6                  O2/Vent Data     None                Physical Exam  Vitals reviewed  Constitutional:       Appearance: He is obese  Interventions: He is sedated and intubated  HENT:      Head: Normocephalic and atraumatic  Cardiovascular:      Rate and Rhythm: Normal rate  Rhythm irregular  Pulses: Normal pulses  Heart sounds: Normal heart sounds  Pulmonary:      Effort: He is intubated  Abdominal:      Palpations: Abdomen is soft  Comments: Wound vac present on abdomen, minimal bowel sounds      Skin:     General: Skin is warm and dry  Neurological:      Comments: Pt is paralyzed and sedated   No response to verbal and noxious stimuli              Laboratory and Diagnostics:  Results from last 7 days   Lab Units 22  1339 22  0556 22  0636 22  0530 22  0720 22  0603   WBC Thousand/uL 5 18 4 93 6 10 5 13 5 72 7 09   HEMOGLOBIN g/dL 11 7* 10 9* 11 4* 11 4* 12 7 12 3   HEMATOCRIT % 36 2* 34 0* 36 1* 35 6* 41 4 38 3   PLATELETS Thousands/uL 168 157 170 145* 151 168   NEUTROS PCT % 84* 69 70  --   --   --    MONOS PCT % 7 9 11  --   --   --      Results from last 7 days   Lab Units 22  1339 22  0636 06/12/22  0806 06/11/22  0720 06/10/22  0544 06/09/22  0603 06/08/22  0602   SODIUM mmol/L 138 136 135* 134* 135* 135* 141   POTASSIUM mmol/L 4 9 4 7 4 6 4 7 4 2 3 8 3 9   CHLORIDE mmol/L 102 100 96* 98* 95* 94* 97*   CO2 mmol/L 26 31 35* 28 35* 36* 37*   ANION GAP mmol/L 10 5 4 8 5 5 7   BUN mg/dL 7 6 6 7 7 8 10   CREATININE mg/dL 0 65 0 73 0 76 0 70 0 83 0 82 0 92   CALCIUM mg/dL 8 1* 8 8 8 8 9 1 8 7 9 1 9 3   GLUCOSE RANDOM mg/dL 175* 127 135 128 138 140 127   ALT U/L 30 44 57  --   --   --   --    AST U/L 21 35 38  --   --   --   --    ALK PHOS U/L 72 83 96  --   --   --   --    ALBUMIN g/dL 2 5* 2 6* 2 7*  --   --   --   --    TOTAL BILIRUBIN mg/dL 1 07* 0 60 0 70  --   --   --   --      Results from last 7 days   Lab Units 06/14/22  1339 06/13/22  0636 06/12/22  0806 06/09/22  0603   MAGNESIUM mg/dL 1 7 2 2 2 0 2 2   PHOSPHORUS mg/dL 3 5  --  4 0 3 9      Results from last 7 days   Lab Units 06/14/22  0700 06/13/22  2111 06/13/22  1329 06/13/22  0636 06/12/22  2352 06/12/22  1719 06/12/22  1024   PTT seconds 22* 87* 42* 36 38* 69* 27              ABG:  Results from last 7 days   Lab Units 06/14/22  1354   PH ART  7 406   PCO2 ART mm Hg 36 9   PO2 ART mm Hg 178 5*   HCO3 ART mmol/L 22 7   BASE EXC ART mmol/L -1 6   ABG SOURCE  Line, Arterial     VBG:  Results from last 7 days   Lab Units 06/14/22  1354   ABG SOURCE  Line, Arterial           Micro        EKG: reviewed  Imaging: I have personally reviewed pertinent reports        Intake and Output  I/O       06/12 0701 06/13 0700 06/13 0701 06/14 0700 06/14 0701  06/15 0700    I V  (mL/kg) 2687 7 (11 2)  4691 4 (19 7)    IV Piggyback   750    Total Intake(mL/kg) 2687 7 (11 2)  5441 4 (22 9)    Urine (mL/kg/hr) 325 (0 1)  185 (0 1)    Emesis/NG output 2700 1150 700    Blood   250    Total Output 3025 1150 1135    Net -337 3 -1150 +4306 4                 Height and Weights   Height: 5' 11" (180 3 cm)  IBW (Ideal Body Weight): 75 3 kg  Body mass index is 73 22 kg/m²  Weight (last 2 days)     Date/Time Weight    06/14/22 0600 238 (525)    06/13/22 0548 239 (526 9)    06/12/22 0600 234 (516 1)            Nutrition       Diet Orders   (From admission, onward)             Start     Ordered    06/14/22 1239  Diet NPO  Diet effective now        References:    Nutrtion Support Algorithm Enteral vs  Parenteral   Question Answer Comment   Diet Type NPO    RD to adjust diet per protocol?  No        06/14/22 1241    06/07/22 1005  Room Service  Once        Question:  Type of Service  Answer:  Room Service-Appropriate    06/07/22 1004                  Active Medications  Scheduled Meds:  Current Facility-Administered Medications   Medication Dose Route Frequency Provider Last Rate    cisatracurium (NIMBEX) in 0 9% sodium chloride infusion  0 1-5 mcg/kg/min Intravenous Titrated Lluvia Hill MD 0 6 mcg/kg/min (06/14/22 1441)    And    artificial tear   Both Eyes Q2H Lluvia Hill MD      chlorhexidine  15 mL Mouth/Throat Q12H [address] [place], [address]      diltiazem  1-15 mg/hr Intravenous Titrated [employer], CRNP 10 mg/hr (06/14/22 1347)    fentaNYL  100 mcg/hr Intravenous Continuous [employer], CRNP 100 mcg/hr (06/14/22 1401)    fentanyl citrate (PF)  100 mcg Intravenous Q2H PRN [employer], CRNP      heparin (porcine)  5,000 Units Subcutaneous [facility] [address], [address]      multi-electrolyte  100 mL/hr Intravenous Continuous [employer], [address] 100 mL/hr (06/14/22 1358)    pantoprazole  40 mg Intravenous Q24H [address] Brendan Kilpatrick MD      propofol  5-80 mcg/kg/min Intravenous Titrated [employer], CRNP 80 mcg/kg/min (06/14/22 1430)     Continuous Infusions:  cisatracurium (NIMBEX) in 0 9% sodium chloride infusion, 0 1-5 mcg/kg/min, Last Rate: 0 6 mcg/kg/min (06/14/22 1441)  diltiazem, 1-15 mg/hr, Last Rate: 10 mg/hr (06/14/22 1347)  fentaNYL, 100 mcg/hr, Last Rate: 100 mcg/hr (06/14/22 1401)  multi-electrolyte, 100 mL/hr, Last Rate: 100 mL/hr (06/14/22 1358)  propofol, 5-80 mcg/kg/min, Last Rate: 80 mcg/kg/min (06/14/22 1430)      PRN Meds:   fentanyl citrate (PF), 100 mcg, Q2H PRN        Invasive Devices Review  Invasive Devices  Report    Peripherally Inserted Central Catheter Line  Duration           PICC Line 59/28/79 Right Basilic 1 day          Central Venous Catheter Line  Duration           CVC Central Lines 06/14/22 Triple <1 day          Peripheral Intravenous Line  Duration           Peripheral IV 06/11/22 Left Forearm 2 days          Arterial Line  Duration           Arterial Line 06/14/22 Left Axillary <1 day          Drain  Duration           NG/OG/Enteral Tube Right nare 3 days    Urethral Catheter Latex 16 Fr  <1 day          Airway  Duration           ETT  Hi-Lo; Cuffed;Oral 8 mm <1 day                Rationale for remaining devices: s/p exploratory laparotomy  ---------------------------------------------------------------------------------------  Advance Directive and Living Will:      Power of :    POLST:    ---------------------------------------------------------------------------------------  Care Time Delivered:   Upon my evaluation, this patient had a high probability of imminent or life-threatening deterioration due to morbid obesity s/p explaratory laparatomy due to SBO, which required my direct attention, intervention, and personal management  I have personally provided 30 minutes (1300 to 1330) of critical care time, exclusive of procedures, teaching, family meetings, and any prior time recorded by providers other than myself  Arelis Barone DO      Portions of the record may have been created with voice recognition software  Occasional wrong word or "sound a like" substitutions may have occurred due to the inherent limitations of voice recognition software    Read the chart carefully and recognize, using context, where substitutions have occurred

## 2022-06-14 NOTE — ASSESSMENT & PLAN NOTE
78-year-old male morbidly obese with history of bariatric surgery presented to [facility] for abdominal pain and transferred to [facility] for surgical evaluation  Patient initially had a due to which was discontinued  He remains NPO due to recurrence of symptoms  Unable to obtain CT scan at current facility due to his weight  Pt given  Levaquin and Flagyl prior to surgery     Patient is now status S/POD0 exploratory laparotomy  Pt is currently intubated and is stable     Patient will be transferred to [facility] on 06/15/2022 for wound vac removal and  plastic surgery

## 2022-06-14 NOTE — RESPIRATORY THERAPY NOTE
Patient arrived intubated from the OR  Placed on full support vent settings  See flowsheet  Currently tolerating settings, VSS, ABG pending  Will continue to monitor  1642  Patient placed onto transport vent with transport team  Report given to charge RT therapist at [place]  Patient VSS, tolerating well

## 2022-06-14 NOTE — PROGRESS NOTES
I called Dr Lazarus Tan at [facility] regarding transfer of the patient postoperatively for higher level of care  She accepted the transfer under her care to ICU  An order for routine transfer will be placed

## 2022-06-14 NOTE — ASSESSMENT & PLAN NOTE
Wt Readings from Last 3 Encounters:   06/13/22 (!) 239 kg (526 lb 14 4 oz)   06/06/22 (!) 226 kg (498 lb 3 8 oz)   06/02/22 (!) 232 kg (512 lb)     · Prior to admission on torsemide and spironolactone    · No evidence of acute decompensation  · Hold on hold while NPO  · Continue IV fluid hydration but monitor volume status closely which may be difficult

## 2022-06-14 NOTE — ASSESSMENT & PLAN NOTE
20-year-old gentleman morbidly obese with history of bariatric surgery currently resides at [address] presented to [facility] ED for abdominal pain and transferred here to [facility] for surgical evaluation  · Initially had NG tube which was clamped and discontinued  · Patient remains NPO and given recurrence of symptoms attempted to obtain imaging but patient cannot have CT scan here due to weight  · Surgery following, plan was initially to transfer to [address] [place] [address] secondary to body habitus and need for plastics evaluation    · But unable to transfer - so plans for OR in am

## 2022-06-14 NOTE — ASSESSMENT & PLAN NOTE
Patient is s/p bariatric surgery( gastric sleeve) but gained significant amount of weight    Currently resolved on nursing facility  BMI 73 22 kg/m2  Patient is well known to Dr Audrey Haider who has done operations in the past

## 2022-06-14 NOTE — ASSESSMENT & PLAN NOTE
Wt Readings from Last 3 Encounters:   06/14/22 (!) 238 kg (525 lb)   06/06/22 (!) 226 kg (498 lb 3 8 oz)   06/02/22 (!) 232 kg (512 lb)       · Prior to admission, patient was on torsemide and Aldactone  · No evidence of acute decompensation  · Last echo 04/2022 with EF of 55%  · Will continue to monitor

## 2022-06-14 NOTE — ASSESSMENT & PLAN NOTE
Patient is s/p bariatric surgery( gastric sleeve) but gained significant amount of weight    Currently resolved on nursing facility  BMI 73 22 kg/m2  Patient is well known to Dr Devan Oropeza who has done operations in the past

## 2022-06-14 NOTE — ASSESSMENT & PLAN NOTE
· Atrial fibrillation prior to admission on metoprolol 100 mg b i d and pradaxa  · While NPO remains on diltiazem and heparin infusions  · Heparin gtt to be held by surgery for OR in am

## 2022-06-14 NOTE — ANESTHESIA POSTPROCEDURE EVALUATION
Post-Op Assessment Note    CV Status:  Stable    Pain management: adequate     Post-procedure mental status: sedated  Hydration Status:  Euvolemic   PONV Controlled:  Controlled  Airway: intubated      Post Op Vitals Reviewed: Yes      Staff: CRNA, other anesthesia staff         No complications documented      BP   124/68   Temp   97 3   Pulse  109   Resp   18   SpO2   99

## 2022-06-14 NOTE — ASSESSMENT & PLAN NOTE
Atrial Fibrillation prior to admission  Patient previously metoprolol 100 mg b i d  And Pradaxa    · Patient remains NPO    · S/p exploratory laparotomy secondary to SBO   · Telemetry  · Continue diltiazem and heparinGTT

## 2022-06-14 NOTE — ASSESSMENT & PLAN NOTE
· Prior to admission on pradaxa currently on heparin infusion while NPO  · Has IVC filter retrievable - however could not be retrieved per dr Alexander Hernandez - not thrombosed as per dr Alexander Hernandez   · Heparin to be held by surgery for OR tomorrow

## 2022-06-14 NOTE — ANESTHESIA PROCEDURE NOTES
Central Line Insertion  Performed by: Salomón Rodriguez MD  Authorized by: Salomón Rodriguez MD     Date/Time: 6/14/2022 8:30 AM  Catheter Type:  triple lumen  Consent: Verbal consent obtained  Written consent obtained  Risks and benefits: risks, benefits and alternatives were discussed  Consent given by: patient  Patient understanding: patient states understanding of the procedure being performed  Patient consent: the patient's understanding of the procedure matches consent given  Procedure consent: procedure consent matches procedure scheduled  Relevant documents: relevant documents present and verified  Test results: test results available and properly labeled  Site marked: the operative site was marked  Required items: required blood products, implants, devices, and special equipment available  Patient identity confirmed: arm band  Time out: Immediately prior to procedure a "time out" was called to verify the correct patient, procedure, equipment, support staff and site/side marked as required    Indications: vascular access  Location details: right internal jugular  Patient position: flat    Sedation:  Patient sedated: yes    Assessment: blood return through all ports and free fluid flow  Preparation: skin prepped with 2% chlorhexidine  Skin prep agent dried: skin prep agent completely dried prior to procedure  Sterile barriers: all five maximum sterile barriers used - cap, mask, sterile gown, sterile gloves, and large sterile sheet  Hand hygiene: hand hygiene performed prior to central venous catheter insertion  Ultrasound guidance: yes  sterile gel and probe cover used in ultrasound-guided central venous catheter insertionultrasound permanent image saved  Vessel of Catheter Tip End: 16  Number of attempts: 1  Successful placement: yes  Post-procedure: line sutured  Patient tolerance: patient tolerated the procedure well with no immediate complications

## 2022-06-14 NOTE — PROGRESS NOTES
[address]  Progress Note - Heron Farias 1970, 47 y o  male MRN: [id number]  Unit/Bed#: [address] Encounter: [id number]  Primary Care Provider: Agatha Goldmann, MD   Date and time admitted to hospital: 6/6/2022  8:24 PM    * SBO  Assessment & Plan  81-year-old gentleman morbidly obese with history of bariatric surgery currently resides at [address] presented to [facility] ED for abdominal pain and transferred here to [facility] for surgical evaluation  · Initially had NG tube which was clamped and discontinued  · Patient remains NPO and given recurrence of symptoms attempted to obtain imaging but patient cannot have CT scan here due to weight  · Surgery following, plan was initially to transfer to [facility] secondary to body habitus and need for plastics evaluation  · But unable to transfer - so plans for OR in am     History of DVT (deep vein thrombosis)  Assessment & Plan  · Prior to admission on pradaxa currently on heparin infusion while NPO  · Has IVC filter retrievable - however could not be retrieved per dr Alis Brown - not thrombosed as per dr Alis Brown   · Heparin to be held by surgery for OR tomorrow     Atrial fibrillation with rapid ventricular response (HCC)  Assessment & Plan  · Atrial fibrillation prior to admission on metoprolol 100 mg b i d and pradaxa  · While NPO remains on diltiazem and heparin infusions  · Heparin gtt to be held by surgery for OR in am     Hypothyroidism  Assessment & Plan  Continue Synthroid 25 mcg po daily   · Recent TSH normal    Super-super obese ([address] )  Assessment & Plan  · Status post bariatric surgery ( gastric sleeve ) but gained back significant amount of weight post surgery over the years   · currently resides in nursing facility  Body mass index is 73 49 kg/m²     Well known to dr oviedo being operated by him for fourth time     Depression  Assessment & Plan  · Continue wellbutrin    COPD (chronic obstructive pulmonary disease) [facility])  Assessment & Plan  · No exacerbation continue symbicort    Chronic diastolic heart failure [facility])  Assessment & Plan  Wt Readings from Last 3 Encounters:   06/13/22 (!) 239 kg (526 lb 14 4 oz)   06/06/22 (!) 226 kg (498 lb 3 8 oz)   06/02/22 (!) 232 kg (512 lb)     · Prior to admission on torsemide and spironolactone    · No evidence of acute decompensation  · Hold on hold while NPO  · Continue IV fluid hydration but monitor volume status closely which may be difficult    Obesity hypoventilation syndrome (HCC)  Assessment & Plan  · Continue oxygen during the day and nocturnal CPAP       Scheduled Meds:  Current Facility-Administered Medications   Medication Dose Route Frequency Provider Last Rate    acetaminophen  325 mg Rectal Q4H PRN RADHA Hernandez      Adult TPN (STANDARD BASE/STANDARD ELECTROLYTE)   Intravenous Continuous TPN Coleen Sieving, PA-C 41 6 mL/hr at 06/13/22 2114    ammonium lactate   Topical BID RADHA Hernandez      barium  900 mL Oral Once in imaging Esme JIMENA Bellamy      budesonide-formoterol  2 puff Inhalation BID RADHA Hernandez      buPROPion  450 mg Oral Daily RADHA Hernandez      dextrose 5 % and sodium chloride 0 9 % with KCl 20 mEq/L  125 mL/hr Intravenous Continuous Frederick Maikel Blevins  mL/hr (06/13/22 1443)    diltiazem  15 mg/hr Intravenous Continuous Maria Isabel Vecellio, PA-C 15 mg/hr (06/13/22 1441)    famotidine  20 mg Intravenous Q12H [address] RADHA Hernandez      fluticasone  1 spray Each Nare Daily RADHA Hernandez      heparin (porcine)  3-20 Units/kg/hr (Order-Specific) Intravenous Titrated Coleen Sieving, PA-C 31 1 Units/kg/hr (06/13/22 1430)    heparin (porcine)  2,000 Units Intravenous Q1H PRN Coleen Sieving, PA-C      heparin (porcine)  4,000 Units Intravenous Q1H PRN Coleen Sieving, PA-C      levalbuterol  0 63 mg Nebulization Q4H PRN RADHA Hernandez      levalbuterol  1 25 mg Nebulization TID Nataly Fong, CRNP      And    sodium chloride  3 mL Nebulization TID Cuiyin Yurik, CRNP      morphine injection  4 mg Intravenous Q4H PRN Cuiyin Yurik, CRNP      morphine injection  2 mg Intravenous Q4H PRN Cuiyin Yurik, CRNP      ondansetron  4 mg Intravenous Q4H PRN Cuiyin Yurik, CRNP      phenol  1 spray Mouth/Throat Q2H PRN Meghna Winkler PA-C      promethazine  25 mg Intravenous Q6H PRN Cuiyin Yurik, CRNP       Continuous Infusions:Adult TPN (STANDARD BASE/STANDARD ELECTROLYTE), , Last Rate: 41 6 mL/hr at 06/13/22 2114  dextrose 5 % and sodium chloride 0 9 % with KCl 20 mEq/L, 125 mL/hr, Last Rate: 125 mL/hr (06/13/22 1443)  diltiazem, 15 mg/hr, Last Rate: 15 mg/hr (06/13/22 1441)  heparin (porcine), 3-20 Units/kg/hr (Order-Specific), Last Rate: 31 1 Units/kg/hr (06/13/22 1430)      PRN Meds:   acetaminophen    barium    heparin (porcine)    heparin (porcine)    levalbuterol    morphine injection    morphine injection    ondansetron    phenol    promethazine    VTE Pharmacologic Prophylaxis: VTE Score: 10 Moderate Risk (Score 3-4) - Pharmacological DVT Prophylaxis Ordered: heparin drip  Patient Centered Rounds: I performed bedside rounds with nursing staff today  Discussions with Specialists or Other Care Team Provider: Dr Rakesh Montalvo and Discussions with Family / Patient: Patient declined call to   Time Spent for Care: 30 minutes  More than 50% of total time spent on counseling and coordination of care as described above      Current Length of Stay: 7 day(s)  Current Patient Status: Inpatient   Certification Statement: The patient will continue to require additional inpatient hospital stay due to has OR in am   Discharge Plan: Anticipate discharge in >72 hrs to to be determined - lives in nursing facility     Code Status: Level 1 - Full Code    Subjective:   Seen examined   Nausea controlled with medications   Abdominal pain with no signs or symptoms of acute abdomen   For OR today   D/w Dr Ana Cristina Cooney in detail     Objective:     Vitals:   Temp (24hrs), Av 6 °F (37 °C), Min:97 4 °F (36 3 °C), Max:99 3 °F (37 4 °C)    Temp:  [97 4 °F (36 3 °C)-99 3 °F (37 4 °C)] 98 9 °F (37 2 °C)  HR:  [53-98] 92  Resp:  [18-20] 18  BP: (121-142)/(70-72) 121/70  SpO2:  [93 %-100 %] 100 %  Body mass index is 73 49 kg/m²  Input and Output Summary (last 24 hours): Intake/Output Summary (Last 24 hours) at 2022  Last data filed at 2022 1800  Gross per 24 hour   Intake --   Output 1300 ml   Net -1300 ml       Physical Exam:   Physical Exam  Constitutional:       Appearance: Normal appearance  He is obese  HENT:      Head: Normocephalic and atraumatic  Mouth/Throat:      Mouth: Mucous membranes are moist    Eyes:      Extraocular Movements: Extraocular movements intact  Pupils: Pupils are equal, round, and reactive to light  Cardiovascular:      Rate and Rhythm: Normal rate  Heart sounds: Normal heart sounds  Pulmonary:      Effort: Pulmonary effort is normal    Abdominal:      General: There is distension  Tenderness: There is no guarding or rebound  Musculoskeletal:      Cervical back: Normal range of motion and neck supple  Right lower leg: Edema present  Left lower leg: Edema present  Skin:     General: Skin is warm  Neurological:      General: No focal deficit present  Mental Status: He is alert and oriented to person, place, and time  Mental status is at baseline  Review of Systems   Gastrointestinal: Positive for abdominal pain and nausea  All other systems reviewed and are negative          Additional Data:     Labs:  Results from last 7 days   Lab Units 22  0636   WBC Thousand/uL 6 10   HEMOGLOBIN g/dL 11 4*   HEMATOCRIT % 36 1*   PLATELETS Thousands/uL 170   NEUTROS PCT % 70   LYMPHS PCT % 15   MONOS PCT % 11   EOS PCT % 3     Results from last 7 days   Lab Units 22  0636   SODIUM mmol/L 136 POTASSIUM mmol/L 4 7   CHLORIDE mmol/L 100   CO2 mmol/L 31   BUN mg/dL 6   CREATININE mg/dL 0 73   ANION GAP mmol/L 5   CALCIUM mg/dL 8 8   ALBUMIN g/dL 2 6*   TOTAL BILIRUBIN mg/dL 0 60   ALK PHOS U/L 83   ALT U/L 44   AST U/L 35   GLUCOSE RANDOM mg/dL 127             Results from last 7 days   Lab Units 06/07/22  0620   HEMOGLOBIN A1C % 5 6           Lines/Drains:  Invasive Devices  Report    Peripherally Inserted Central Catheter Line  Duration           PICC Line 96/78/82 Right Basilic <1 day          Peripheral Intravenous Line  Duration           Long-Dwell Peripheral IV (Midline) 06/06/22 Right Brachial 6 days    Peripheral IV 06/11/22 Left Forearm 2 days          Drain  Duration           NG/OG/Enteral Tube Right nare 2 days                Central Line:  Goal for removal: as per yoanna          Telemetry:  Telemetry Orders (From admission, onward)             48 Hour Telemetry Monitoring  Continuous x 48 hours        References:    Telemetry Guidelines   Question:  Reason for 48 Hour Telemetry  Answer:  Arrhythmias Requiring Medical Therapy (eg  SVT, Vtach/fib, Bradycardia, Uncontrolled A-fib)                 Telemetry Reviewed: chronic a fib  Indication for Continued Telemetry Use: Arrthymias requiring medical therapy             Imaging: No pertinent imaging reviewed      Recent Cultures (last 7 days):         Last 24 Hours Medication List:   Current Facility-Administered Medications   Medication Dose Route Frequency Provider Last Rate    acetaminophen  325 mg Rectal Q4H PRN RADHA Hernandez      Adult TPN (STANDARD BASE/STANDARD ELECTROLYTE)   Intravenous Continuous TPN Leonard Shin PA-C 41 6 mL/hr at 06/13/22 2114    ammonium lactate   Topical BID RADHA Hernandez      barium  900 mL Oral Once in imaging Mani Medina PA-C      budesonide-formoterol  2 puff Inhalation BID RADHA Hernandez      buPROPion  450 mg Oral Daily RADHA Hernandez      dextrose 5 % and sodium chloride 0 9 % with KCl 20 mEq/L  125 mL/hr Intravenous Continuous Frederick Cherry Farfan  mL/hr (06/13/22 1443)    diltiazem  15 mg/hr Intravenous Continuous Maria Isabel Vecellio, PA-C 15 mg/hr (06/13/22 1441)    famotidine  20 mg Intravenous Q12H [address] Cuiyin Yurik, CRNP      fluticasone  1 spray Each Nare Daily Cuiyin Yurik, CRNP      heparin (porcine)  3-20 Units/kg/hr (Order-Specific) Intravenous Titrated Celesta Lango, PA-C 31 1 Units/kg/hr (06/13/22 1430)    heparin (porcine)  2,000 Units Intravenous Q1H PRN Celesta Lango, PA-C      heparin (porcine)  4,000 Units Intravenous Q1H PRN Celesta Lango, PA-C      levalbuterol  0 63 mg Nebulization Q4H PRN Cuiyin Yurik, CRNP      levalbuterol  1 25 mg Nebulization TID Cuiyin Yurik, CRNP      And    sodium chloride  3 mL Nebulization TID Cuiyin Yurik, CRNP      morphine injection  4 mg Intravenous Q4H PRN Cuiyin Yurik, CRNP      morphine injection  2 mg Intravenous Q4H PRN Cuiyin Yurik, CRNP      ondansetron  4 mg Intravenous Q4H PRN Cuiyin Yurik, CRNP      phenol  1 spray Mouth/Throat Q2H PRN Celesta Lango, PA-C      promethazine  25 mg Intravenous Q6H PRN Cuiyin Yurik, CRNP          Today, Patient Was Seen By: Sneha Payne MD    **Please Note: This note may have been constructed using a voice recognition system  **

## 2022-06-14 NOTE — ASSESSMENT & PLAN NOTE
60-year-old male morbidly obese with history of bariatric surgery presented to [facility] ED for abdominal pain and transferred to [facility] for [facility] evaluation  Patient initially had a due to which was discontinued  He remains NPO due to recurrence of symptoms  Unable to obtain CT scan at current facility due to his weight  Pt given  Levaquin and Flagyl prior to surgery     Patient is now status S/POD0 exploratory laparotomy  Pt is currently intubated and is stable     Patient will be transferred to [facility] on 06/15/2022 for wound vac removal and  plastic surgery

## 2022-06-14 NOTE — PROGRESS NOTES
Np Deleon ordered to keep sedation dip at 80 mcgkg min  and Fentanyl 100mcg/hr due to RR 22/min at set rate of 18  also to keep Nimbex at 1 1mcg/kg min with TOF 0/4

## 2022-06-14 NOTE — RESPIRATORY THERAPY NOTE
RT Protocol Note  Avinash Dietrich 47 y o  male MRN: [id number]  Unit/Bed#: ICU 07 Encounter: [id number]    Assessment    Active Problems:    * No active hospital problems  *      Home Pulmonary Medications:  DuoNeb, Symbicort       Past Medical History:   Diagnosis Date    Afib (HCC)     Anemia     Anxiety     Cancer (HCC)     Cardiac disease     Cellulitis     COPD (chronic obstructive pulmonary disease) (HCC)     Depression     Diabetes mellitus (HCC)     DVT (deep venous thrombosis) (HCC)     GERD (gastroesophageal reflux disease)     HBP (high blood pressure)     Heart failure (HCC)     Hx of blood clots     Hypertension     Hypokalemia     Hypothyroid     Obesity     Psychiatric disorder      Social History     Socioeconomic History    Marital status: Single     Spouse name: Not on file    Number of children: Not on file    Years of education: Not on file    Highest education level: Not on file   Occupational History    Occupation: Currently at [address]   Tobacco Use    Smoking status: Former Smoker     Packs/day: 1 00     Years: 15 00     Pack years: 15 00    Smokeless tobacco: Never Used    Tobacco comment: 1 5ppd x 23 years, quit 2014   Vaping Use    Vaping Use: Never used   Substance and Sexual Activity    Alcohol use: Not Currently    Drug use: No    Sexual activity: Not on file   Other Topics Concern    Not on file   Social History Narrative    Do you currently or have you served in the [employer]: No    Were you activated, into active duty, as a member of the [employer] or as a Reservist: No    Live alone or with others: with others lives at [facility]    Marital status: Single    Are you currently employed: No    Alcohol intake: None    Passive smoke exposure:  Yes    Asbestos exposure: No    TB exposure: No    Environmental exposure: No    Animal exposure: Yes    uses pap nebulizer and oxygen     Social Determinants of Health     Financial Resource Strain: Not on file   Food Insecurity: No Food Insecurity    Worried About Running Out of Food in the Last Year: Never true    Ran Out of Food in the Last Year: Never true   Transportation Needs: No Transportation Needs    Lack of Transportation (Medical): No    Lack of Transportation (Non-Medical): No   Physical Activity: Not on file   Stress: Not on file   Social Connections: Not on file   Intimate Partner Violence: Not on file   Housing Stability: Low Risk     Unable to Pay for Housing in the Last Year: No    Number of Places Lived in the Last Year: 1    Unstable Housing in the Last Year: No       Subjective         Objective    Physical Exam:   Assessment Type: (P) Assess only  General Appearance: (P) Sedated  Respiratory Pattern: (P) Assisted  Chest Assessment: (P) Chest expansion symmetrical  Bilateral Breath Sounds: (P) Diminished    Vitals:  Pulse 84, resp  rate (!) 6, SpO2 99 %  Results from last 7 days   Lab Units 06/14/22  1354   PH ART  7 406   PCO2 ART mm Hg 36 9   PO2 ART mm Hg 178 5*   HCO3 ART mmol/L 22 7   BASE EXC ART mmol/L -1 6   O2 CONTENT ART mL/dL 17 4   O2 HGB, ARTERIAL % 98 3*   ABG SOURCE  Line, Arterial   DB TEST  No       Imaging and other studies: I have personally reviewed pertinent films in PACS          Plan             Resp Comments: (P) Patient traferred from [address] and placed on same vent settings  Confirmed bilateral breath sounds  Patient comfortable on current settings  Ordered pt on resp protocol and ordered tx to maintain home meds

## 2022-06-14 NOTE — PLAN OF CARE
Problem: RESPIRATORY - ADULT  Goal: Achieves optimal ventilation and oxygenation  Description: INTERVENTIONS:  - Assess for changes in respiratory status  - Assess for changes in mentation and behavior  - Position to facilitate oxygenation and minimize respiratory effort  - Oxygen administered by appropriate delivery if ordered  - Initiate smoking cessation education as indicated  - Encourage broncho-pulmonary hygiene including cough, deep breathe, Incentive Spirometry  - Assess the need for suctioning and aspirate as needed  - Assess and instruct to report SOB or any respiratory difficulty  - Respiratory Therapy support as indicated  Outcome: Progressing     Problem: CARDIOVASCULAR - ADULT  Goal: Maintains optimal cardiac output and hemodynamic stability  Description: INTERVENTIONS:  - Monitor I/O, vital signs and rhythm  - Monitor for S/S and trends of decreased cardiac output  - Administer and titrate ordered vasoactive medications to optimize hemodynamic stability  - Assess quality of pulses, skin color and temperature  - Assess for signs of decreased coronary artery perfusion  - Instruct patient to report change in severity of symptoms  Outcome: Progressing  Goal: Absence of cardiac dysrhythmias or at baseline rhythm  Description: INTERVENTIONS:  - Continuous cardiac monitoring, vital signs, obtain 12 lead EKG if ordered  - Administer antiarrhythmic and heart rate control medications as ordered  - Monitor electrolytes and administer replacement therapy as ordered  Outcome: Progressing     Problem: GASTROINTESTINAL - ADULT  Goal: Minimal or absence of nausea and/or vomiting  Description: INTERVENTIONS:  - Administer IV fluids if ordered to ensure adequate hydration  - Maintain NPO status until nausea and vomiting are resolved  - Nasogastric tube if ordered  - Administer ordered antiemetic medications as needed  - Provide nonpharmacologic comfort measures as appropriate  - Advance diet as tolerated, if ordered  - Consider nutrition services referral to assist patient with adequate nutrition and appropriate food choices  Outcome: Progressing  Goal: Maintains or returns to baseline bowel function  Description: INTERVENTIONS:  - Assess bowel function  - Encourage oral fluids to ensure adequate hydration  - Administer IV fluids if ordered to ensure adequate hydration  - Administer ordered medications as needed  - Encourage mobilization and activity  - Consider nutritional services referral to assist patient with adequate nutrition and appropriate food choices  Outcome: Progressing  Goal: Oral mucous membranes remain intact  Description: INTERVENTIONS  - Assess oral mucosa and hygiene practices  - Implement preventative oral hygiene regimen  - Implement oral medicated treatments as ordered  - Initiate Nutrition services referral as needed  Outcome: Progressing     Problem: MOBILITY - ADULT  Goal: Maintain or return to baseline ADL function  Description: INTERVENTIONS:  -  Assess patient's ability to carry out ADLs; assess patient's baseline for ADL function and identify physical deficits which impact ability to perform ADLs (bathing, care of mouth/teeth, toileting, grooming, dressing, etc )  - Assess/evaluate cause of self-care deficits   - Assess range of motion  - Assess patient's mobility; develop plan if impaired  - Assess patient's need for assistive devices and provide as appropriate  - Encourage maximum independence but intervene and supervise when necessary  - Involve family in performance of ADLs  - Assess for home care needs following discharge   - Consider OT consult to assist with ADL evaluation and planning for discharge  - Provide patient education as appropriate  Outcome: Progressing  Goal: Maintains/Returns to pre admission functional level  Description: INTERVENTIONS:  - Perform BMAT or MOVE assessment daily    - Set and communicate daily mobility goal to care team and patient/family/caregiver     - Collaborate with rehabilitation services on mobility goals if consulted  - Out of bed for toileting  - Record patient progress and toleration of activity level   Outcome: Progressing     Problem: Potential for Falls  Goal: Patient will remain free of falls  Description: INTERVENTIONS:  - Educate patient/family on patient safety including physical limitations  - Instruct patient to call for assistance with activity   - Consult OT/PT to assist with strengthening/mobility   - Keep Call bell within reach  - Keep bed low and locked with side rails adjusted as appropriate  - Keep care items and personal belongings within reach  - Initiate and maintain comfort rounds  - Make Fall Risk Sign visible to staff  - Apply yellow socks and bracelet for high fall risk patients  - Consider moving patient to room near nurses station  Outcome: Progressing     Problem: Nutrition/Hydration-ADULT  Goal: Nutrient/Hydration intake appropriate for improving, restoring or maintaining nutritional needs  Description: Monitor and assess patient's nutrition/hydration status for malnutrition  Collaborate with interdisciplinary team and initiate plan and interventions as ordered  Monitor patient's weight and dietary intake as ordered or per policy  Utilize nutrition screening tool and intervene as necessary  Determine patient's food preferences and provide high-protein, high-caloric foods as appropriate       INTERVENTIONS:  - Monitor oral intake, urinary output, labs, and treatment plans  - Assess nutrition and hydration status and recommend course of action  - Evaluate amount of meals eaten  - Assist patient with eating if necessary   - Allow adequate time for meals  - Recommend/ encourage appropriate diets, oral nutritional supplements, and vitamin/mineral supplements  - Order, calculate, and assess calorie counts as needed  - Recommend, monitor, and adjust tube feedings and TPN/PPN based on assessed needs  - Assess need for intravenous fluids  - Provide specific nutrition/hydration education as appropriate  - Include patient/family/caregiver in decisions related to nutrition  Outcome: Progressing

## 2022-06-14 NOTE — CASE MANAGEMENT
Case Management Progress Note    Patient name Cooper Waggoner  Location ICU 08/ICU 08 MRN [id number]  : 1970 Date 2022       LOS (days): 8  Geometric Mean LOS (GMLOS) (days):   Days to GMLOS:        OBJECTIVE:        Current admission status: Inpatient  Preferred Pharmacy:   [id number] [address]  [address] [place] [address]  Phone: [phone] Fax: [phone]    Primary Care Provider: Mariya Diallo MD    Primary Insurance: [employer],[facility]  Secondary Insurance:     PROGRESS NOTE:  Weekly Care Management Length of Stay Review     Current LOS: 8    Most Recent Labs:     Lab Results   Component Value Date/Time    WBC 4 93 2022 05:56 AM    HGB 10 9 (L) 2022 05:56 AM    HCT 34 0 (L) 2022 05:56 AM     2022 05:56 AM    SODIUM 136 2022 06:36 AM    K 4 7 2022 06:36 AM     2022 06:36 AM    CO2 31 2022 06:36 AM    BUN 6 2022 06:36 AM    CREATININE 0 73 2022 06:36 AM    GLUC 127 2022 06:36 AM    ALKPHOS 83 2022 06:36 AM    ALT 44 2022 06:36 AM    AST 35 2022 06:36 AM    ALB 2 6 (L) 2022 06:36 AM    TBILI 0 60 2022 06:36 AM    TRIG 172 (H) 2022 06:36 AM       Most Recent Vitals:   Vitals:    22 1234   BP: 165/88   Pulse: 83   Resp:    Temp: 98 4 °F (36 9 °C)   SpO2: 98%        Identified Barriers to DC/Discharge Goals/ CM Interventions: admitted with SBO, NG tube clamped last Friday through Saturday  NG was DC and then replaced   OR again today and transferred to ICU post-op      Intended Discharge Disposition: Satya Leger    Expected Discharge Date: TB

## 2022-06-14 NOTE — DISCHARGE SUMMARY
Discharge Summary - [facility] Critical Care    Patient Information: Alirio Rogers 47 y o  male MRN: [id number]  Unit/Bed#: ICU 08 Encounter: [id number]    Discharging Physician / Practitioner: Remington Eli DO  PCP: Jersey Alejandra MD    Admission Date: 6/6/2022  Discharge Date: 06/14/22    Reason for Admission:  Small-bowel obstruction    Discharge Diagnoses:   Principal Problem:    SBO  Active Problems: Morbid obesity (HCC)    Atrial fibrillation with rapid ventricular response (HCC)    Chronic diastolic heart failure (HCC)    History of DVT (deep vein thrombosis)    Obesity hypoventilation syndrome (HCC)    COPD (chronic obstructive pulmonary disease) (HCC)    Hypothyroidism    Depression  Resolved Problems:    * No resolved hospital problems  *      Consultations During Hospital Stay:  · Surgery  · Critical care    Procedures Performed:     · Exploratory laparotomy    Significant Findings:     · CT abdomen pelvis without contrast  · Findings consistent with small bowel obstruction at the proximal jejunum with transition point at the left upper abdomen  · Large ventral abdominal wall hernia containing numerous loops of small and large bowel  · XR abdomen obstruction series  · Persistent partial SBO    Incidental Findings:   · None     Test Results Pending at Discharge (will require follow up):   · none     Complications:  None per surgical team    Hospital Course:     Alirio Rogers is a 47 y o  male patient who was hospitalized at [facility]  for abdominal pain and constipation  Patient was found to have small-bowel obstruction proximal jejunum transition point on CT abdomen  At that time NG tube was placed for decompression and suction  Patient was then transferred to [facility] for exploratory laparotomy which was performed on 06/14/2022     During admission, patient was on heparin drip given his history of atrial fibrillation  This was stopped 24 hours prior to his surgery    Patient was also given Levaquin and Flagyl prior to his exploratory lap  According to  Dr Gabriella Krishnamurthy, patient had large fascial defect with loss of domain secondary to his morbid obesity which required patient to be placed on wound VAC  Patient was then admitted to ICU for further management since he was kept paralyzed and sedated to be bowel rest per surgical team   Patient was then transferred to [facility] ICU for higher level of care with plan for plastic surgery consult for reconstruction  * SBO  Assessment & Plan  30-year-old male morbidly obese with history of bariatric surgery presented to [place] ED for abdominal pain and transferred to [facility] for surgical evaluation  Patient initially had a due to which was discontinued  He remains NPO due to recurrence of symptoms  Unable to obtain CT scan at current facility due to his weight  Pt given  Levaquin and Flagyl prior to surgery     Patient is now status S/POD0 exploratory laparotomy  Pt is currently intubated and is stable  Patient will be transferred to [facility] on 06/15/2022 for wound vac removal and  plastic surgery     Morbid obesity [facility])  Assessment & Plan  Patient is s/p bariatric surgery( gastric sleeve) but gained significant amount of weight  Currently resolved on nursing facility  BMI 73 22 kg/m2  Patient is well known to Dr Gabriella Krishnamurthy who has done operations in the past    Atrial fibrillation with rapid ventricular response [facility])  Assessment & Plan  Atrial Fibrillation prior to admission  Patient previously metoprolol 100 mg b i d  And Pradaxa    · Patient remains NPO    · S/p exploratory laparotomy secondary to SBO   · Telemetry  · Continue diltiazem and heparinGTT      Chronic diastolic heart failure [facility])  Assessment & Plan  Wt Readings from Last 3 Encounters:   06/14/22 (!) 238 kg (525 lb)   06/06/22 (!) 226 kg (498 lb 3 8 oz)   06/02/22 (!) 232 kg (512 lb)       · Prior to admission, patient was on torsemide and Aldactone  · No evidence of acute decompensation  · Last echo 04/2022 with EF of 55%  · Will continue to monitor    History of DVT (deep vein thrombosis)  Assessment & Plan  Patient was previously on Pradaxa prior to admission  Prior to surgery, patient was on heparin drip  Patient also has IVC filter which was unable to be retrieved    Heparin drip will be resumed 6 hours after surgery    Depression  Assessment & Plan  Stable    Will be resumed after patient is extubated    Hypothyroidism  Assessment & Plan  Stable    TSH 1 78   Continue levothyroxine     COPD (chronic obstructive pulmonary disease) ([facility] [address] )  Assessment & Plan  Stable     Patient currently intubated    Obesity hypoventilation syndrome [facility])  Assessment & Plan  Patient currently intubated s/p surgery    Physical Exam  Constitutional:       Appearance: He is obese  Interventions: He is sedated and intubated  Cardiovascular:      Rate and Rhythm: Normal rate  Rhythm irregular  Pulses: Normal pulses  Heart sounds: Normal heart sounds  Pulmonary:      Effort: Pulmonary effort is normal  He is intubated  Abdominal:      Palpations: Abdomen is soft  Comments: Bowel sounds minimal, open abdomen with wound vac present   Skin:     General: Skin is warm and dry  Neurological:      Comments: Paralyzed and sedated        Condition at Discharge: critical     Discharge Day Visit / Exam:     * Please refer to separate progress for these details *    Discharge instructions/Information to patient and family:   See after visit summary for information provided to patient and family  Provisions for Follow-Up Care:  See after visit summary for information related to follow-up care and any pertinent home health orders  Disposition: Surgical ICU at [facility] Readmission: none    Discharge Statement:  I spent 45 minutes discharging the patient  This time was spent on the day of discharge   I had direct contact with the patient on the day of discharge  Greater than 50% of the total time was spent examining patient, answering all patient questions, arranging and discussing plan of care with patient as well as directly providing post-discharge instructions  Additional time then spent on discharge activities  Discharge Medications:  See after visit summary for reconciled discharge medications provided to patient and family      Discharge Diet: npo  Activity restrictions: no activity     Eunice Kruger DO  6/14/2022  3:49 PM

## 2022-06-14 NOTE — ASSESSMENT & PLAN NOTE
· Status post bariatric surgery ( gastric sleeve ) but gained back significant amount of weight post surgery over the years   · currently resides in nursing facility  Body mass index is 73 49 kg/m²     Well known to dr Bettie Lazar being operated by him for fourth time

## 2022-06-14 NOTE — ANESTHESIA PROCEDURE NOTES
Arterial Line Insertion  Performed by: Kj Eid MD  Authorized by: Kj Eid MD   Consent: Verbal consent obtained  Written consent obtained  Risks and benefits: risks, benefits and alternatives were discussed  Consent given by: patient  Patient understanding: patient states understanding of the procedure being performed  Patient consent: the patient's understanding of the procedure matches consent given  Procedure consent: procedure consent matches procedure scheduled  Relevant documents: relevant documents present and verified  Test results: test results available and properly labeled  Site marked: the operative site was marked  Radiology Images: Radiology Images displayed and confirmed  If images not available, report reviewed  Patient identity confirmed: verbally with patient and arm band  Time out: Immediately prior to procedure a "time out" was called to verify the correct patient, procedure, equipment, support staff and site/side marked as required  Preparation: Patient was prepped and draped in the usual sterile fashion    Indications: multiple ABGs and hemodynamic monitoring  Orientation:  Left  Location: axillary artery  Procedure Details:  Kb's test normal: yes  Needle gauge: 20  Seldinger technique: Seldinger technique used  Number of attempts: 1    Post-procedure:  Post-procedure: dressing applied  Waveform: good waveform  Post-procedure CNS: normal  Patient tolerance: patient tolerated the procedure well with no immediate complications

## 2022-06-14 NOTE — ASSESSMENT & PLAN NOTE
Patient was previously on Pradaxa prior to admission    Prior to surgery, patient was on heparin drip  Patient also has IVC filter which was unable to be retrieved    Heparin drip will be resumed 6 hours after surgery

## 2022-06-14 NOTE — H&P
History and Physical - Critical Care   Marilu Jordan 47 y o  male MRN: [id number]  Unit/Bed#: ICU 07 Encounter: [id number]    Reason for Admission / Chief Complaint:   No chief complaint on file  History of Present Illness:  Marilu Jordan is a 47 y o  male who presents with open abdomen after surgery for SBO  Patient was initially seen at [facility] for abdominal pain and constipation  CT showed SBO and patient was transferred to [facility] for surgery  Patient underwent ex-lap and adhesions were removed  Patient had a large fascial defect with loss of domain due to his morbid obesity which required patient to be placed on wound VAC  Patient was transferred to the ICU and later decision was made to transfer to [facility] for further management and care     History obtained from chart review      Past Medical History:  Past Medical History:   Diagnosis Date    Afib ([facility] [address] [facility]     Anemia     Anxiety     Cancer ([facility] [address] [facility]     Cardiac disease     Cellulitis     [facility]     Depression     [facility]     DVT (deep venous thrombosis) (HCC)     GERD (gastroesophageal reflux disease)     HBP (high blood pressure)     Heart failure (HCC)     Hx of blood clots     Hypertension     Hypokalemia     Hypothyroid     Obesity     Psychiatric disorder        Past Surgical History:  Past Surgical History:   Procedure Laterality Date    ABDOMINAL HERNIA REPAIR  05/2019    CHOLECYSTECTOMY      Lap    GASTRECTOMY SLEEVE LAPAROSCOPIC  08/2018    INCISION AND DRAINAGE OF WOUND Bilateral 12/01/2021    Procedure: INCISION AND DRAINAGE (I&D) HEAD/FACE;  Surgeon: Kae Askew MD;  Location:  MAIN OR;  Service: General    IR PICC PLACEMENT DOUBLE LUMEN  06/13/2022    IVC FILTER INSERTION  2018    KNEE SURGERY Right     open wound, injury     LEG SURGERY Right 2009    US GUIDED VASCULAR ACCESS  08/06/2018       Past Family History:  Family History   Problem Relation Age of Onset  Lung cancer Father     Diabetes Maternal Aunt     Heart attack Mother     Clotting disorder Mother     Hypertension Mother     Heart failure Mother     Diabetes Mother     Atrial fibrillation Mother     Sleep apnea Mother     Obesity Mother     Asthma Sister     Stroke Neg Hx     Anuerysm Neg Hx     Arrhythmia Neg Hx     Hyperlipidemia Neg Hx         pt unsure    Fainting Neg Hx        Social History:  Social History     Tobacco Use   Smoking Status Former Smoker    Packs/day: 1 00    Years: 15 00    Pack years: 15 00   Smokeless Tobacco Never Used   Tobacco Comment    1 5ppd x 23 years, quit 2014     Social History     Substance and Sexual Activity   Alcohol Use Not Currently     Social History     Substance and Sexual Activity   Drug Use No     Marital Status: Single      Medications:  Current Facility-Administered Medications   Medication Dose Route Frequency    [START ON 6/15/2022] ceFAZolin (ANCEF) 3,000 mg in dextrose 5 % 100 mL IVPB  3,000 mg Intravenous On Call To OR    chlorhexidine (PERIDEX) 0 12 % oral rinse 15 mL  15 mL Mouth/Throat Q12H [facility]    diltiazem (CARDIZEM) 125 mg in sodium chloride 0 9 % 125 mL infusion  1-15 mg/hr Intravenous Titrated    fentaNYL 1000 mcg in sodium chloride 0 9% 100mL infusion  100 mcg/hr Intravenous Continuous    fentanyl citrate (PF) 100 MCG/2ML 50 mcg  50 mcg Intravenous Q1H PRN    insulin lispro (HumaLOG) 100 units/mL subcutaneous injection 1-5 Units  1-5 Units Subcutaneous Q6H [facility]    ipratropium (ATROVENT) 0 02 % inhalation solution 0 5 mg  0 5 mg Nebulization TID    levalbuterol (XOPENEX) inhalation solution 1 25 mg  1 25 mg Nebulization TID    [START ON 6/15/2022] metroNIDAZOLE (FLAGYL) IVPB (premix) 500 mg 100 mL  500 mg Intravenous On Call To OR    multi-electrolyte (PLASMALYTE-A/ISOLYTE-S PH 7 4) IV solution  125 mL/hr Intravenous Continuous    [START ON 6/15/2022] pantoprazole (PROTONIX) injection 40 mg  40 mg Intravenous Q24H [facility]    propofol (DIPRIVAN) 1000 mg in 100 mL infusion (premix)  5-50 mcg/kg/min Intravenous Titrated     Home medications:  Prior to Admission medications    Medication Sig Start Date End Date Taking? Authorizing Provider   aluminum-magnesium hydroxide-simethicone (MYLANTA) 200-200-20 mg/5 mL suspension Take 30 mL by mouth every 6 (six) hours as needed for indigestion or heartburn 3/18/22   Lawrence Santacruz MD   ammonium lactate (LAC-HYDRIN) 12 % cream Apply topically 2 (two) times a day 11/15/21   Yudi Castillo MD   budesonide-formoterol [facility]) 160-4 5 mcg/act inhaler Inhale 2 puffs 2 (two) times a day Rinse mouth after use      Historical Provider, MD   buPROPion (FORFIVO XL) 450 MG 24 hr tablet Take 1 tablet (450 mg total) by mouth daily 11/16/21   Yudi Castillo MD   Cholecalciferol (VITAMIN D) 50 MCG (2000 UT) tablet Take 2,000 Units by mouth daily    Historical Provider, MD   cyanocobalamin (VITAMIN B-12) 1000 MCG tablet Take 1,000 mcg by mouth daily    Historical Provider, MD   dabigatran etexilate (PRADAXA) 150 mg capsu Take 150 mg by mouth 2 (two) times a day    Historical Provider, MD   dicyclomine (BENTYL) 20 mg tablet Take 1 tablet (20 mg total) by mouth 4 (four) times a day (before meals and at bedtime) 3/18/22   Lawrence Santacruz MD   docusate sodium (COLACE) 100 mg capsule Take 1 capsule (100 mg total) by mouth 2 (two) times a day 3/18/22   Lawrence Santacruz MD   famotidine (PEPCID) 20 mg tablet Take 1 tablet (20 mg total) by mouth daily 3/19/22   Lawrence Santacruz MD   ipratropium-albuterol (DUO-NEB) 0 5-2 5 mg/3 mL nebulizer solution Take 3 mL by nebulization 3 (three) times a day    Historical Provider, MD   levothyroxine 25 mcg tablet Take 25 mcg by mouth daily    Historical Provider, MD   loratadine (CLARITIN) 10 mg tablet Take 10 mg by mouth daily    Historical Provider, MD   metoprolol tartrate (LOPRESSOR) 50 mg tablet Take 2 tablets (100 mg total) by mouth every 12 (twelve) hours 4/10/22   Clint Young PA-C   nystatin (MYCOSTATIN) cream Apply topically 2 (two) times a day  Patient not taking: Reported on 2022 11/15/21   Mariella Jean MD   omeprazole (PriLOSEC) 20 mg delayed release capsule Take 40 mg by mouth daily 22   Historical Provider, MD   ondansetron (ZOFRAN) 4 mg tablet Take 1 tablet (4 mg total) by mouth every 8 (eight) hours as needed for nausea or vomiting  Patient not taking: Reported on 2022 3/18/22   Rizwana Abdi MD   polyethylene glycol (MIRALAX) 17 g packet Take 17 g by mouth daily 3/19/22   Rizwana Abdi MD   Potassium Chloride (KCL-20 PO) Take 20 mg by mouth in the morning    Historical Provider, MD   sodium chloride (OCEAN) 0 65 % nasal spray 1 spray into each nostril as needed for congestion 11/15/21   Mariella Jean MD   spironolactone (ALDACTONE) 50 mg tablet Take 50 mg by mouth daily    Historical Provider, MD   torsemide 40 MG TABS Take 40 mg by mouth 2 (two) times a day 3/18/22   Rizwana Abdi MD     Allergies: Allergies   Allergen Reactions    Penicillins Hives       ROS:   Review of Systems   Unable to perform ROS: Intubated      Unable to assess due to patient being intubated     Vitals:  Vitals:    22 2148   BP:    107/62   Pulse: 88 90 90 88   Resp: 20 18 18 18   Temp: (!) 97 16 °F (36 2 °C) (!) 97 16 °F (36 2 °C) (!) 97 16 °F (36 2 °C) 97 52 °F (36 4 °C)   SpO2: 99% 98% 99% 99%     Temperature:   Temp (24hrs), Av 9 °F (36 1 °C), Min:96 3 °F (35 7 °C), Max:98 4 °F (36 9 °C)    Current Temperature: 97 52 °F (36 4 °C)    Weights: There is no height or weight on file to calculate BMI      Hemodynamic Monitoring:  N/A     Non-Invasive/Invasive Ventilation Settings:  Respiratory  Report   Lab Data (Last 4 hours)    None         O2/Vent Data (Last 4 hours)    None              Lab Results   Component Value Date    PHART 7 406 2022 ULQ1NTT 36 9 06/14/2022    PO2ART 178 5 (H) 06/14/2022    IRU4POD 22 7 06/14/2022    BEART -1 6 06/14/2022    SOURCE Line, Arterial 06/14/2022     SpO2: SpO2: 99 %     Physical Exam:  Physical Exam  Vitals and nursing note reviewed  Constitutional:       Appearance: He is obese  Comments: Morbidly   HENT:      Head: Normocephalic and atraumatic  Right Ear: External ear normal       Left Ear: External ear normal    Eyes:      Conjunctiva/sclera: Conjunctivae normal       Pupils: Pupils are equal, round, and reactive to light  Neck:      Thyroid: No thyromegaly  Cardiovascular:      Rate and Rhythm: Normal rate and regular rhythm  Heart sounds: Normal heart sounds  No murmur heard  No friction rub  No gallop  Pulmonary:      Effort: Pulmonary effort is normal  No respiratory distress  Breath sounds: Normal breath sounds  Abdominal:      General: Bowel sounds are normal       Palpations: Abdomen is soft  Tenderness: There is abdominal tenderness  Comments: Open abdomen with VAC placed   Genitourinary:     Penis: Normal     Musculoskeletal:      Cervical back: Normal range of motion  Skin:     General: Skin is warm and dry  Neurological:      Mental Status: He is alert  Deep Tendon Reflexes: Reflexes are normal and symmetric        Comments: Sedated          Labs:  Results from last 7 days   Lab Units 06/14/22 1847 06/14/22  1339 06/14/22  0556 06/13/22  0636 06/12/22  0530 06/11/22  0720 06/09/22  0603   WBC Thousand/uL 9 04 5 18 4 93 6 10 5 13 5 72 7 09   HEMOGLOBIN g/dL 11 4* 11 7* 10 9* 11 4* 11 4* 12 7 12 3   HEMATOCRIT % 34 9* 36 2* 34 0* 36 1* 35 6* 41 4 38 3   PLATELETS Thousands/uL 162 168 157 170 145* 151 168   NEUTROS PCT %  --  84* 69 70  --   --   --    MONOS PCT %  --  7 9 11  --   --   --      Results from last 7 days   Lab Units 06/14/22  1847 06/14/22  1339 06/13/22  0636 06/12/22  0806 06/11/22  0720 06/10/22  0544 06/09/22  0603   SODIUM mmol/L 135* 138 136 135* 134* 135* 135*   POTASSIUM mmol/L 4 8 4 9 4 7 4 6 4 7 4 2 3 8   CHLORIDE mmol/L 104 102 100 96* 98* 95* 94*   CO2 mmol/L 26 26 31 35* 28 35* 36*   ANION GAP mmol/L 5 10 5 4 8 5 5   BUN mg/dL 6 7 6 6 7 7 8   CREATININE mg/dL 0 62 0 65 0 73 0 76 0 70 0 83 0 82   CALCIUM mg/dL 9 0 8 1* 8 8 8 8 9 1 8 7 9 1   ALT U/L 32 30 44 57  --   --   --    AST U/L 19 21 35 38  --   --   --    ALK PHOS U/L 65 72 83 96  --   --   --    ALBUMIN g/dL 2 4* 2 5* 2 6* 2 7*  --   --   --    TOTAL BILIRUBIN mg/dL 1 04* 1 07* 0 60 0 70  --   --   --      Results from last 7 days   Lab Units 06/14/22  1339 06/13/22  0636 06/12/22  0806 06/09/22  0603   MAGNESIUM mg/dL 1 7 2 2 2 0 2 2   PHOSPHORUS mg/dL 3 5  --  4 0 3 9      Results from last 7 days   Lab Units 06/14/22  0700 06/13/22  2111 06/13/22  1329 06/13/22  0636 06/12/22  2352 06/12/22  1719 06/12/22  1024   PTT seconds 22* 87* 42* 36 38* 69* 27             ABG:  Lab Results   Component Value Date    PHART 7 406 06/14/2022    YPA4GOO 36 9 06/14/2022    PO2ART 178 5 (H) 06/14/2022    CZL1ILM 22 7 06/14/2022    BEART -1 6 06/14/2022    SOURCE Line, Arterial 06/14/2022     VBG:  Results from last 7 days   Lab Units 06/14/22  1354   ABG SOURCE  Line, Arterial             Imaging:  I have personally reviewed pertinent reports  EKG: This was personally reviewed by myself  Micro:        ______________________________________________________________________    Assessment:   Active Problems:    * No active hospital problems  *  Resolved Problems:    * No resolved hospital problems  *        Plan:    · Neuro:   Dx: No active issues   - Analgesia/Sedation:    - Fentanyl @ 100   - Propofol @ 50    - prn fentanyl for agitation   - Delirium ppx: CAM-ICU, sleep hygiene      · CV:   Dx: Hx of Afib    - Cardizem gtt    - Heparin gtt on hold for OR tomorrow  - Hemodynamic support: none   - MAP goal >65    · Lung:   Dx:  Acute respiratory failure   - Vent day 1  - Setting CMV , 490/40%/18/8  - Continue respiratory protocol and airway clearance protocl     · GI:   Dx: Open abdomen with VAC    - Plan for OR tomorrow by Red surgery   - Stress ulcer ppx: Protonix  - Bowel regimen: dulcolax and senna    · FEN:   - Fluids: 125cc/hr   - Electrolytes: Trend and replete as needed  - Nutrition: NPO     · :   - Dx: No active issues  - Eastman in place   - Monitor I&Os  - Monitor BUN/Cr    · ID:   - Dx: No active issues  - Abx: Plan for Ancef and flagyl prior to OR tomorrow  - Monitor temp/WBC curve     · Heme:   Dx: Hx of DVT    - s/p IVC filter   - Heparin gtt on hold  - Hb: 11 4   - Continue to trend Hb  - DVT ppx: Heparin gtt, SCDs    · Endo:   Dx: Hyperglycemia    - SSI   - Monitor blood glucose     · Msk/Skin:   Dx: Ex lap wound   - Continue wound care   - PT/OT  - Turning/Repositioning    · Disposition: Continue ICU care       ______________________________________________________________________    VTE Pharmacologic Prophylaxis: Heparin  VTE Mechanical Prophylaxis: sequential compression device    Invasive lines and devices: Invasive Devices  Report    Peripherally Inserted Central Catheter Line  Duration           PICC Line 75/03/13 Right Basilic 1 day          Central Venous Catheter Line  Duration           CVC Central Lines 06/14/22 Triple <1 day          Peripheral Intravenous Line  Duration           Peripheral IV 06/11/22 Left Forearm 3 days          Arterial Line  Duration           Arterial Line 06/14/22 Left Axillary <1 day          Drain  Duration           NG/OG/Enteral Tube Right nare 3 days    Urethral Catheter Latex 16 Fr  <1 day          Airway  Duration           ETT  Hi-Lo; Cuffed;Oral 8 mm <1 day                Code Status: Level 1 - Full Code  POA:    POLST:      Given critical illness, patient length of stay will require greater than two midnights  Portions of the record may have been created with voice recognition software    Occasional wrong word or "sound a like" substitutions may have occurred due to the inherent limitations of voice recognition software  Read the chart carefully and recognize, using context, where substitutions have occurred        Des Mak MD

## 2022-06-15 ENCOUNTER — ANESTHESIA (INPATIENT)
Dept: PERIOP | Facility: HOSPITAL | Age: 52
DRG: 230 | End: 2022-06-15
Payer: COMMERCIAL

## 2022-06-15 ENCOUNTER — ANESTHESIA EVENT (INPATIENT)
Dept: PERIOP | Facility: HOSPITAL | Age: 52
DRG: 230 | End: 2022-06-15
Payer: COMMERCIAL

## 2022-06-15 LAB
ABO GROUP BLD: NORMAL
ABO GROUP BLD: NORMAL
ALBUMIN SERPL BCP-MCNC: 2.3 G/DL (ref 3.5–5)
ALP SERPL-CCNC: 63 U/L (ref 46–116)
ALT SERPL W P-5'-P-CCNC: 29 U/L (ref 12–78)
ANION GAP SERPL CALCULATED.3IONS-SCNC: 1 MMOL/L (ref 4–13)
ANION GAP SERPL CALCULATED.3IONS-SCNC: 6 MMOL/L (ref 4–13)
APTT PPP: 29 SECONDS (ref 23–37)
APTT PPP: 32 SECONDS (ref 23–37)
AST SERPL W P-5'-P-CCNC: 15 U/L (ref 5–45)
ATRIAL RATE: 116 BPM
BASOPHILS # BLD AUTO: 0.01 THOUSANDS/ΜL (ref 0–0.1)
BASOPHILS # BLD AUTO: 0.01 THOUSANDS/ΜL (ref 0–0.1)
BASOPHILS NFR BLD AUTO: 0 % (ref 0–1)
BASOPHILS NFR BLD AUTO: 0 % (ref 0–1)
BILIRUB SERPL-MCNC: 0.7 MG/DL (ref 0.2–1)
BLD GP AB SCN SERPL QL: NEGATIVE
BUN SERPL-MCNC: 6 MG/DL (ref 5–25)
BUN SERPL-MCNC: 8 MG/DL (ref 5–25)
CA-I BLD-SCNC: 1.16 MMOL/L (ref 1.12–1.32)
CALCIUM ALBUM COR SERPL-MCNC: 10.1 MG/DL (ref 8.3–10.1)
CALCIUM SERPL-MCNC: 8.7 MG/DL (ref 8.3–10.1)
CALCIUM SERPL-MCNC: 8.7 MG/DL (ref 8.3–10.1)
CHLORIDE SERPL-SCNC: 102 MMOL/L (ref 100–108)
CHLORIDE SERPL-SCNC: 103 MMOL/L (ref 100–108)
CO2 SERPL-SCNC: 28 MMOL/L (ref 21–32)
CO2 SERPL-SCNC: 32 MMOL/L (ref 21–32)
CREAT SERPL-MCNC: 0.65 MG/DL (ref 0.6–1.3)
CREAT SERPL-MCNC: 0.78 MG/DL (ref 0.6–1.3)
EOSINOPHIL # BLD AUTO: 0 THOUSAND/ΜL (ref 0–0.61)
EOSINOPHIL # BLD AUTO: 0 THOUSAND/ΜL (ref 0–0.61)
EOSINOPHIL NFR BLD AUTO: 0 % (ref 0–6)
EOSINOPHIL NFR BLD AUTO: 0 % (ref 0–6)
ERYTHROCYTE [DISTWIDTH] IN BLOOD BY AUTOMATED COUNT: 14.8 % (ref 11.6–15.1)
ERYTHROCYTE [DISTWIDTH] IN BLOOD BY AUTOMATED COUNT: 14.8 % (ref 11.6–15.1)
GFR SERPL CREATININE-BSD FRML MDRD: 104 ML/MIN/1.73SQ M
GFR SERPL CREATININE-BSD FRML MDRD: 112 ML/MIN/1.73SQ M
GLUCOSE SERPL-MCNC: 139 MG/DL (ref 65–140)
GLUCOSE SERPL-MCNC: 141 MG/DL (ref 65–140)
GLUCOSE SERPL-MCNC: 142 MG/DL (ref 65–140)
GLUCOSE SERPL-MCNC: 146 MG/DL (ref 65–140)
GLUCOSE SERPL-MCNC: 146 MG/DL (ref 65–140)
GLUCOSE SERPL-MCNC: 155 MG/DL (ref 65–140)
GLUCOSE SERPL-MCNC: 160 MG/DL (ref 65–140)
HCT VFR BLD AUTO: 34.6 % (ref 36.5–49.3)
HCT VFR BLD AUTO: 37.9 % (ref 36.5–49.3)
HGB BLD-MCNC: 11.2 G/DL (ref 12–17)
HGB BLD-MCNC: 12.1 G/DL (ref 12–17)
IMM GRANULOCYTES # BLD AUTO: 0.04 THOUSAND/UL (ref 0–0.2)
IMM GRANULOCYTES # BLD AUTO: 0.08 THOUSAND/UL (ref 0–0.2)
IMM GRANULOCYTES NFR BLD AUTO: 0 % (ref 0–2)
IMM GRANULOCYTES NFR BLD AUTO: 1 % (ref 0–2)
INR PPP: 1.22 (ref 0.84–1.19)
LYMPHOCYTES # BLD AUTO: 0.71 THOUSANDS/ΜL (ref 0.6–4.47)
LYMPHOCYTES # BLD AUTO: 0.86 THOUSANDS/ΜL (ref 0.6–4.47)
LYMPHOCYTES NFR BLD AUTO: 7 % (ref 14–44)
LYMPHOCYTES NFR BLD AUTO: 8 % (ref 14–44)
MAGNESIUM SERPL-MCNC: 2.3 MG/DL (ref 1.6–2.6)
MAGNESIUM SERPL-MCNC: 2.3 MG/DL (ref 1.6–2.6)
MCH RBC QN AUTO: 30 PG (ref 26.8–34.3)
MCH RBC QN AUTO: 30.1 PG (ref 26.8–34.3)
MCHC RBC AUTO-ENTMCNC: 31.9 G/DL (ref 31.4–37.4)
MCHC RBC AUTO-ENTMCNC: 32.4 G/DL (ref 31.4–37.4)
MCV RBC AUTO: 93 FL (ref 82–98)
MCV RBC AUTO: 94 FL (ref 82–98)
MONOCYTES # BLD AUTO: 0.71 THOUSAND/ΜL (ref 0.17–1.22)
MONOCYTES # BLD AUTO: 0.81 THOUSAND/ΜL (ref 0.17–1.22)
MONOCYTES NFR BLD AUTO: 7 % (ref 4–12)
MONOCYTES NFR BLD AUTO: 8 % (ref 4–12)
NEUTROPHILS # BLD AUTO: 8.17 THOUSANDS/ΜL (ref 1.85–7.62)
NEUTROPHILS # BLD AUTO: 8.83 THOUSANDS/ΜL (ref 1.85–7.62)
NEUTS SEG NFR BLD AUTO: 83 % (ref 43–75)
NEUTS SEG NFR BLD AUTO: 86 % (ref 43–75)
NRBC BLD AUTO-RTO: 0 /100 WBCS
NRBC BLD AUTO-RTO: 0 /100 WBCS
NT-PROBNP SERPL-MCNC: 767 PG/ML
PHOSPHATE SERPL-MCNC: 4 MG/DL (ref 2.7–4.5)
PHOSPHATE SERPL-MCNC: 4.7 MG/DL (ref 2.7–4.5)
PLATELET # BLD AUTO: 168 THOUSANDS/UL (ref 149–390)
PLATELET # BLD AUTO: 171 THOUSANDS/UL (ref 149–390)
PMV BLD AUTO: 10.3 FL (ref 8.9–12.7)
PMV BLD AUTO: 10.6 FL (ref 8.9–12.7)
POTASSIUM SERPL-SCNC: 4.7 MMOL/L (ref 3.5–5.3)
POTASSIUM SERPL-SCNC: 5.2 MMOL/L (ref 3.5–5.3)
POTASSIUM SERPL-SCNC: 5.3 MMOL/L (ref 3.5–5.3)
PR INTERVAL: 0 MS
PROT SERPL-MCNC: 5.7 G/DL (ref 6.4–8.2)
PROTHROMBIN TIME: 14.9 SECONDS (ref 11.6–14.5)
QRS AXIS: 21 DEGREES
QRSD INTERVAL: 96 MS
QT INTERVAL: 346 MS
QTC INTERVAL: 481 MS
RBC # BLD AUTO: 3.72 MILLION/UL (ref 3.88–5.62)
RBC # BLD AUTO: 4.04 MILLION/UL (ref 3.88–5.62)
RH BLD: POSITIVE
RH BLD: POSITIVE
SODIUM SERPL-SCNC: 136 MMOL/L (ref 136–145)
SODIUM SERPL-SCNC: 136 MMOL/L (ref 136–145)
SPECIMEN EXPIRATION DATE: NORMAL
T WAVE AXIS: 43 DEGREES
VENTRICULAR RATE: 116 BPM
WBC # BLD AUTO: 10.59 THOUSAND/UL (ref 4.31–10.16)
WBC # BLD AUTO: 9.64 THOUSAND/UL (ref 4.31–10.16)

## 2022-06-15 PROCEDURE — 82330 ASSAY OF CALCIUM: CPT | Performed by: INTERNAL MEDICINE

## 2022-06-15 PROCEDURE — 85610 PROTHROMBIN TIME: CPT | Performed by: EMERGENCY MEDICINE

## 2022-06-15 PROCEDURE — 85730 THROMBOPLASTIN TIME PARTIAL: CPT | Performed by: SURGERY

## 2022-06-15 PROCEDURE — 80053 COMPREHEN METABOLIC PANEL: CPT | Performed by: INTERNAL MEDICINE

## 2022-06-15 PROCEDURE — 99291 CRITICAL CARE FIRST HOUR: CPT | Performed by: SURGERY

## 2022-06-15 PROCEDURE — 85025 COMPLETE CBC W/AUTO DIFF WBC: CPT | Performed by: NURSE PRACTITIONER

## 2022-06-15 PROCEDURE — 93010 ELECTROCARDIOGRAM REPORT: CPT | Performed by: INTERNAL MEDICINE

## 2022-06-15 PROCEDURE — 83880 ASSAY OF NATRIURETIC PEPTIDE: CPT | Performed by: ANESTHESIOLOGY

## 2022-06-15 PROCEDURE — 85730 THROMBOPLASTIN TIME PARTIAL: CPT | Performed by: EMERGENCY MEDICINE

## 2022-06-15 PROCEDURE — NC001 PR NO CHARGE: Performed by: SURGERY

## 2022-06-15 PROCEDURE — 84132 ASSAY OF SERUM POTASSIUM: CPT | Performed by: STUDENT IN AN ORGANIZED HEALTH CARE EDUCATION/TRAINING PROGRAM

## 2022-06-15 PROCEDURE — 84100 ASSAY OF PHOSPHORUS: CPT | Performed by: INTERNAL MEDICINE

## 2022-06-15 PROCEDURE — 0DQA0ZZ REPAIR JEJUNUM, OPEN APPROACH: ICD-10-PCS | Performed by: SURGERY

## 2022-06-15 PROCEDURE — 49002 REOPENING OF ABDOMEN: CPT | Performed by: SURGERY

## 2022-06-15 PROCEDURE — 86850 RBC ANTIBODY SCREEN: CPT | Performed by: SURGERY

## 2022-06-15 PROCEDURE — 83735 ASSAY OF MAGNESIUM: CPT | Performed by: NURSE PRACTITIONER

## 2022-06-15 PROCEDURE — 80048 BASIC METABOLIC PNL TOTAL CA: CPT | Performed by: NURSE PRACTITIONER

## 2022-06-15 PROCEDURE — 93005 ELECTROCARDIOGRAM TRACING: CPT

## 2022-06-15 PROCEDURE — 97605 NEG PRS WND THER DME<=50SQCM: CPT | Performed by: SURGERY

## 2022-06-15 PROCEDURE — 86900 BLOOD TYPING SEROLOGIC ABO: CPT | Performed by: SURGERY

## 2022-06-15 PROCEDURE — 94003 VENT MGMT INPAT SUBQ DAY: CPT

## 2022-06-15 PROCEDURE — C9113 INJ PANTOPRAZOLE SODIUM, VIA: HCPCS | Performed by: INTERNAL MEDICINE

## 2022-06-15 PROCEDURE — 0DQ80ZZ REPAIR SMALL INTESTINE, OPEN APPROACH: ICD-10-PCS | Performed by: SURGERY

## 2022-06-15 PROCEDURE — 83735 ASSAY OF MAGNESIUM: CPT | Performed by: INTERNAL MEDICINE

## 2022-06-15 PROCEDURE — 94760 N-INVAS EAR/PLS OXIMETRY 1: CPT

## 2022-06-15 PROCEDURE — 94640 AIRWAY INHALATION TREATMENT: CPT

## 2022-06-15 PROCEDURE — 82948 REAGENT STRIP/BLOOD GLUCOSE: CPT

## 2022-06-15 PROCEDURE — 85025 COMPLETE CBC W/AUTO DIFF WBC: CPT | Performed by: INTERNAL MEDICINE

## 2022-06-15 PROCEDURE — 84100 ASSAY OF PHOSPHORUS: CPT | Performed by: NURSE PRACTITIONER

## 2022-06-15 PROCEDURE — 0DN80ZZ RELEASE SMALL INTESTINE, OPEN APPROACH: ICD-10-PCS | Performed by: SURGERY

## 2022-06-15 PROCEDURE — 86901 BLOOD TYPING SEROLOGIC RH(D): CPT | Performed by: SURGERY

## 2022-06-15 RX ORDER — HEPARIN SODIUM 1000 [USP'U]/ML
4000 INJECTION, SOLUTION INTRAVENOUS; SUBCUTANEOUS
Status: DISCONTINUED | OUTPATIENT
Start: 2022-06-15 | End: 2022-06-18

## 2022-06-15 RX ORDER — ROCURONIUM BROMIDE 10 MG/ML
INJECTION, SOLUTION INTRAVENOUS AS NEEDED
Status: DISCONTINUED | OUTPATIENT
Start: 2022-06-15 | End: 2022-06-15

## 2022-06-15 RX ORDER — SODIUM CHLORIDE, SODIUM GLUCONATE, SODIUM ACETATE, POTASSIUM CHLORIDE, MAGNESIUM CHLORIDE, SODIUM PHOSPHATE, DIBASIC, AND POTASSIUM PHOSPHATE .53; .5; .37; .037; .03; .012; .00082 G/100ML; G/100ML; G/100ML; G/100ML; G/100ML; G/100ML; G/100ML
750 INJECTION, SOLUTION INTRAVENOUS ONCE
Status: COMPLETED | OUTPATIENT
Start: 2022-06-15 | End: 2022-06-16

## 2022-06-15 RX ORDER — FUROSEMIDE 10 MG/ML
40 INJECTION INTRAMUSCULAR; INTRAVENOUS ONCE
Status: COMPLETED | OUTPATIENT
Start: 2022-06-15 | End: 2022-06-15

## 2022-06-15 RX ORDER — SPIRONOLACTONE 50 MG/1
50 TABLET, FILM COATED ORAL DAILY
Status: DISCONTINUED | OUTPATIENT
Start: 2022-06-15 | End: 2022-06-18

## 2022-06-15 RX ORDER — METOPROLOL TARTRATE 5 MG/5ML
10 INJECTION INTRAVENOUS EVERY 6 HOURS
Status: DISCONTINUED | OUTPATIENT
Start: 2022-06-15 | End: 2022-06-16

## 2022-06-15 RX ORDER — HEPARIN SODIUM 1000 [USP'U]/ML
4000 INJECTION, SOLUTION INTRAVENOUS; SUBCUTANEOUS ONCE
Status: COMPLETED | OUTPATIENT
Start: 2022-06-15 | End: 2022-06-15

## 2022-06-15 RX ORDER — SODIUM CHLORIDE, SODIUM LACTATE, CALCIUM CHLORIDE, MAGNESIUM CHLORIDE AND DEXTROSE 2.5; 538; 448; 25.7; 5.08 G/100ML; MG/100ML; MG/100ML; MG/100ML; MG/100ML
500 INJECTION, SOLUTION INTRAPERITONEAL CONTINUOUS
Status: DISCONTINUED | OUTPATIENT
Start: 2022-06-15 | End: 2022-06-15

## 2022-06-15 RX ORDER — SODIUM CHLORIDE, SODIUM LACTATE, CALCIUM CHLORIDE, MAGNESIUM CHLORIDE AND DEXTROSE 2.5; 538; 448; 25.7; 5.08 G/100ML; MG/100ML; MG/100ML; MG/100ML; MG/100ML
125 INJECTION, SOLUTION INTRAPERITONEAL CONTINUOUS
Status: DISCONTINUED | OUTPATIENT
Start: 2022-06-15 | End: 2022-06-18

## 2022-06-15 RX ORDER — MAGNESIUM HYDROXIDE 1200 MG/15ML
LIQUID ORAL AS NEEDED
Status: DISCONTINUED | OUTPATIENT
Start: 2022-06-15 | End: 2022-06-15 | Stop reason: HOSPADM

## 2022-06-15 RX ORDER — HEPARIN SODIUM 10000 [USP'U]/100ML
3-20 INJECTION, SOLUTION INTRAVENOUS
Status: DISCONTINUED | OUTPATIENT
Start: 2022-06-15 | End: 2022-06-18

## 2022-06-15 RX ORDER — LEVOTHYROXINE SODIUM 0.03 MG/1
25 TABLET ORAL
Status: DISCONTINUED | OUTPATIENT
Start: 2022-06-16 | End: 2022-06-18

## 2022-06-15 RX ORDER — DEXTROSE MONOHYDRATE 25 G/50ML
25 INJECTION, SOLUTION INTRAVENOUS ONCE
Status: COMPLETED | OUTPATIENT
Start: 2022-06-15 | End: 2022-06-15

## 2022-06-15 RX ORDER — SODIUM CHLORIDE, SODIUM LACTATE, POTASSIUM CHLORIDE, CALCIUM CHLORIDE 600; 310; 30; 20 MG/100ML; MG/100ML; MG/100ML; MG/100ML
INJECTION, SOLUTION INTRAVENOUS CONTINUOUS PRN
Status: DISCONTINUED | OUTPATIENT
Start: 2022-06-15 | End: 2022-06-15

## 2022-06-15 RX ORDER — CEFAZOLIN SODIUM 1 G/3ML
INJECTION, POWDER, FOR SOLUTION INTRAMUSCULAR; INTRAVENOUS AS NEEDED
Status: DISCONTINUED | OUTPATIENT
Start: 2022-06-15 | End: 2022-06-15

## 2022-06-15 RX ORDER — HEPARIN SODIUM 1000 [USP'U]/ML
2000 INJECTION, SOLUTION INTRAVENOUS; SUBCUTANEOUS
Status: DISCONTINUED | OUTPATIENT
Start: 2022-06-15 | End: 2022-06-18

## 2022-06-15 RX ADMIN — Medication 100 MCG/HR: at 00:00

## 2022-06-15 RX ADMIN — SODIUM CHLORIDE, SODIUM GLUCONATE, SODIUM ACETATE, POTASSIUM CHLORIDE, MAGNESIUM CHLORIDE, SODIUM PHOSPHATE, DIBASIC, AND POTASSIUM PHOSPHATE 125 ML/HR: .53; .5; .37; .037; .03; .012; .00082 INJECTION, SOLUTION INTRAVENOUS at 05:47

## 2022-06-15 RX ADMIN — SUGAMMADEX 400 MG: 100 INJECTION, SOLUTION INTRAVENOUS at 13:08

## 2022-06-15 RX ADMIN — IPRATROPIUM BROMIDE 0.5 MG: 0.5 SOLUTION RESPIRATORY (INHALATION) at 20:01

## 2022-06-15 RX ADMIN — LEVALBUTEROL HYDROCHLORIDE 1.25 MG: 1.25 SOLUTION, CONCENTRATE RESPIRATORY (INHALATION) at 20:01

## 2022-06-15 RX ADMIN — PROPOFOL 35 MCG/KG/MIN: 10 INJECTION, EMULSION INTRAVENOUS at 03:32

## 2022-06-15 RX ADMIN — DILTIAZEM HYDROCHLORIDE 2.5 MG/HR: 5 INJECTION INTRAVENOUS at 11:02

## 2022-06-15 RX ADMIN — METOROPROLOL TARTRATE 10 MG: 5 INJECTION, SOLUTION INTRAVENOUS at 16:33

## 2022-06-15 RX ADMIN — PROPOFOL 10 MCG/KG/MIN: 10 INJECTION, EMULSION INTRAVENOUS at 21:46

## 2022-06-15 RX ADMIN — PROPOFOL 15 MCG/KG/MIN: 10 INJECTION, EMULSION INTRAVENOUS at 09:58

## 2022-06-15 RX ADMIN — ROCURONIUM BROMIDE 50 MG: 50 INJECTION INTRAVENOUS at 11:50

## 2022-06-15 RX ADMIN — CHLORHEXIDINE GLUCONATE 15 ML: 1.2 SOLUTION ORAL at 09:24

## 2022-06-15 RX ADMIN — METOROPROLOL TARTRATE 10 MG: 5 INJECTION, SOLUTION INTRAVENOUS at 21:45

## 2022-06-15 RX ADMIN — PANTOPRAZOLE SODIUM 40 MG: 40 INJECTION, POWDER, FOR SOLUTION INTRAVENOUS at 09:24

## 2022-06-15 RX ADMIN — SPIRONOLACTONE 50 MG: 50 TABLET ORAL at 17:14

## 2022-06-15 RX ADMIN — HEPARIN SODIUM 4000 UNITS: 1000 INJECTION INTRAVENOUS; SUBCUTANEOUS at 04:53

## 2022-06-15 RX ADMIN — PROPOFOL 35 MCG/KG/MIN: 10 INJECTION, EMULSION INTRAVENOUS at 01:42

## 2022-06-15 RX ADMIN — PROPOFOL 45 MCG/KG/MIN: 10 INJECTION, EMULSION INTRAVENOUS at 00:16

## 2022-06-15 RX ADMIN — METRONIDAZOLE: 500 SOLUTION INTRAVENOUS at 11:50

## 2022-06-15 RX ADMIN — IPRATROPIUM BROMIDE 0.5 MG: 0.5 SOLUTION RESPIRATORY (INHALATION) at 07:36

## 2022-06-15 RX ADMIN — INSULIN LISPRO 1 UNITS: 100 INJECTION, SOLUTION INTRAVENOUS; SUBCUTANEOUS at 00:13

## 2022-06-15 RX ADMIN — CHLORHEXIDINE GLUCONATE 15 ML: 1.2 SOLUTION ORAL at 21:45

## 2022-06-15 RX ADMIN — SODIUM CHLORIDE, SODIUM GLUCONATE, SODIUM ACETATE, POTASSIUM CHLORIDE, MAGNESIUM CHLORIDE, SODIUM PHOSPHATE, DIBASIC, AND POTASSIUM PHOSPHATE 750 ML: .53; .5; .37; .037; .03; .012; .00082 INJECTION, SOLUTION INTRAVENOUS at 23:05

## 2022-06-15 RX ADMIN — SODIUM CHLORIDE, SODIUM LACTATE, CALCIUM CHLORIDE, MAGNESIUM CHLORIDE AND DEXTROSE 500 ML/HR: 2.5; 538; 448; 25.7; 5.08 INJECTION, SOLUTION INTRAPERITONEAL at 14:17

## 2022-06-15 RX ADMIN — Medication 100 MCG/HR: at 22:22

## 2022-06-15 RX ADMIN — PROPOFOL 15 MCG/KG/MIN: 10 INJECTION, EMULSION INTRAVENOUS at 15:22

## 2022-06-15 RX ADMIN — CEFAZOLIN 3 MG: 1 INJECTION, POWDER, FOR SOLUTION INTRAMUSCULAR; INTRAVENOUS at 11:50

## 2022-06-15 RX ADMIN — PROPOFOL 25 MCG/KG/MIN: 10 INJECTION, EMULSION INTRAVENOUS at 06:22

## 2022-06-15 RX ADMIN — HEPARIN SODIUM 11.1 UNITS/KG/HR: 10000 INJECTION, SOLUTION INTRAVENOUS at 04:54

## 2022-06-15 RX ADMIN — DEXTROSE MONOHYDRATE 25 ML: 25 INJECTION, SOLUTION INTRAVENOUS at 07:36

## 2022-06-15 RX ADMIN — Medication 100 MCG/HR: at 09:59

## 2022-06-15 RX ADMIN — ROCURONIUM BROMIDE 30 MG: 50 INJECTION INTRAVENOUS at 12:20

## 2022-06-15 RX ADMIN — LEVALBUTEROL HYDROCHLORIDE 1.25 MG: 1.25 SOLUTION, CONCENTRATE RESPIRATORY (INHALATION) at 07:36

## 2022-06-15 RX ADMIN — INSULIN HUMAN 5 UNITS: 100 INJECTION, SOLUTION PARENTERAL at 07:44

## 2022-06-15 RX ADMIN — HEPARIN SODIUM 4000 UNITS: 1000 INJECTION INTRAVENOUS; SUBCUTANEOUS at 22:03

## 2022-06-15 RX ADMIN — FUROSEMIDE 40 MG: 10 INJECTION, SOLUTION INTRAMUSCULAR; INTRAVENOUS at 15:07

## 2022-06-15 RX ADMIN — SODIUM CHLORIDE, SODIUM LACTATE, POTASSIUM CHLORIDE, AND CALCIUM CHLORIDE: .6; .31; .03; .02 INJECTION, SOLUTION INTRAVENOUS at 11:50

## 2022-06-15 NOTE — RESPIRATORY THERAPY NOTE
RT Ventilator Management Note  Gato Wu 47 y o  male MRN: [id number]  Unit/Bed#: MICU 05 Encounter: [id number]      Daily Screen         6/14/2022  1755 6/15/2022  0736          Patient safety screen outcome[de-identified] Failed Failed (P)       Not Ready for Weaning due to[de-identified] Underline problem not resolved Underline problem not resolved;Going on Transport intubated (P)                 Physical Exam:   Assessment Type: (P) During-treatment  General Appearance: (P) Sedated  Respiratory Pattern: (P) Assisted  Chest Assessment: (P) Chest expansion symmetrical  O2 Device: VENT      Resp Comments: (P) pt is currently on CMV settings no sbt performed pt is for OR today will conitnue to monitor

## 2022-06-15 NOTE — CASE MANAGEMENT
Case Management Assessment & Discharge Planning Note    Patient name Dagoberto Ra  Location MICU 05/MICU 05 MRN [id number]  : 1970 Date 6/15/2022       Current Admission Date: 2022  Current Admission Diagnosis:SBO   Patient Active Problem List    Diagnosis Date Noted    Hypertension 2022    Diabetes mellitus ([facility] [facility] 2022    Depression 2022    Intractable abdominal pain 2022    Epidermoid cyst of neck 2022    Chronic respiratory failure with hypoxia ([facility] 2021    Cellulitis of multiple sites of head and neck 2021    Elevated troponin 2021    History of DVT (deep vein thrombosis) 2021    Positive blood culture 2021    QT prolongation 2021    COPD (chronic obstructive pulmonary disease) ([facility] [facility] ) 05/10/2021    Hypothyroidism 05/10/2021    Medical non-compliance 2017    Atrial fibrillation with RVR ([facility] 2017    Foot pain 11/15/2016    Knee pain 11/15/2016    SBO 2016    Recurrent bronchospasm 10/16/2016    Venous stasis dermatitis of both lower extremities 10/16/2016    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[facility] [facility] ) 10/14/2016    Obesity hypoventilation syndrome ([facility] [facility] ) 2016    Chronic diastolic heart failure (HCC) 2016    MRSA (methicillin resistant Staphylococcus aureus) Tracheobronchitis 2016    Atrial fibrillation with rapid ventricular response ([facility] [facility] ) 08/15/2016    Morbid obesity ([facility] [facility] ) 08/15/2016    Tremor 08/15/2016      LOS (days): 1  Geometric Mean LOS (GMLOS) (days):   Days to GMLOS:     OBJECTIVE:  PATIENT READMITTED TO HOSPITAL  Risk of Unplanned Readmission Score: 36 1         Current admission status: Inpatient       Preferred Pharmacy:   [facility]/pharmacy #[id number]- [address]  [place]   [address] [address], [address] [facility]  Phone: [phone] Fax: [phone]    Primary Care Provider: Mahi Cook MD    Primary Insurance: [employer]  Secondary Insurance:     ASSESSMENT:  [address] Representative - Significant Other   Primary Phone: [phone] (Mobile)                         Readmission Root Cause  30 Day Readmission: No    Patient Information  Mental Status: Intubated  During Assessment patient was accompanied by: Not accompanied during assessment  Assessment information provided by[de-identified]  (patient opened 6/7/22 [address])  Primary Caregiver: Other (Comment) (staff)  Caregiver's Name[de-identified] [address] Relationship to Patient[de-identified] Other (Specify) (staff)  Caregiver's Telephone Number[de-identified] [phone]  Support Systems: Spouse/significant other, Other (Comment) (staff)  What city do you live in?: [facility] entry access options  Select all that apply : No steps to enter home  Type of Current Residence: Facility  In the last 12 months, was there a time when you were not able to pay the mortgage or rent on time?: No  In the last 12 months, how many places have you lived?: 1  In the last 12 months, was there a time when you did not have a steady place to sleep or slept in a shelter (including now)?: No  Homeless/housing insecurity resource given?: N/A  Living Arrangements: Other (Comment) (SNF)    Activities of Daily Living Prior to Admission  Functional Status: Total dependent  Completes ADLs independently?: No  Level of ADL dependence: Total Dependent  Ambulates independently?: No  Level of ambulatory dependence:  Total Dependent (per previous open 6/7: bedbound, per pt was just starting PT again at facility prior to hospitalization)         Patient Information Continued  Income Source: SSI/SSD  Does patient have prescription coverage?: Yes  Within the past 12 months, you worried that your food would run out before you got the money to buy more : Never true  Within the past 12 months, the food you bought just didn't last and you didn't have money to get more : Never true  Food insecurity resource given?: N/A  Does patient receive dialysis treatments?: No         Means of Transportation  Means of Transport to Appts[de-identified] Other (Comment) (medical transport)  In the past 12 months, has lack of transportation kept you from medical appointments or from getting medications?: No  In the past 12 months, has lack of transportation kept you from meetings, work, or from getting things needed for daily living?: No  Was application for public transport provided?: N/A        DISCHARGE DETAILS:    Discharge planning discussed with[de-identified] Patient opened 6/7  Nancy       Other Referral/Resources/Interventions Provided:  Interventions: SNF    Patient/caregiver received discharge checklist   Content reviewed  Patient/caregiver encouraged to participate in discharge plan of care prior to discharge home  CM reviewed d/c planning process including the following: identifying help at home, patient preference for d/c planning needs, Discharge Lounge, [facility] Meds to Bed program, availability of treatment team to discuss questions or concerns patient and/or family may have regarding understanding medications and recognizing signs and symptoms once discharged  CM also encouraged patient to follow up with all recommended appointments after discharge  Patient advised of importance for patient and family to participate in managing patients medical well being  Per chart review, pt is a LTC resident at [address] [place] [address] and is to return upon d/c  Pt is bed bound and totally dependent of all cares

## 2022-06-15 NOTE — ANESTHESIA POSTPROCEDURE EVALUATION
Post-Op Assessment Note    CV Status:  Stable  Pain Score: 0    Pain management: adequate     Mental Status:  Arousable   Hydration Status:  Stable   PONV Controlled:  None   Airway Patency:  Patent  Airway: intubated      Post Op Vitals Reviewed: Yes      Staff: Anesthesiologist, CRNA         No complications documented      BP   148/82   Temp 98   Pulse 150   Resp 16   SpO2 100      Pt transferred to ICU intubated, O2, monitors, with support staff

## 2022-06-15 NOTE — UTILIZATION REVIEW
Initial Clinical Review    Admission: Date/Time/Statement:   Admission Orders (From admission, onward)     Ordered        06/14/22 1749  Inpatient Admission  Once                      Orders Placed This Encounter   Procedures    Inpatient Admission     Standing Status:   Standing     Number of Occurrences:   1     Order Specific Question:   Level of Care     Answer:   Critical Care [15]     Order Specific Question:   Estimated length of stay     Answer:   More than 2 Midnights     Order Specific Question:   Certification     Answer:   I certify that inpatient services are medically necessary for this patient for a duration of greater than two midnights  See H&P and MD Progress Notes for additional information about the patient's course of treatment  Initial Presentation: 47 y o  male with PMH of ventral hernia, prior gastric sleeve surg, DVT, afib, hypothyroidism, morbid obesity-BMI 73 was transferred from [facility] to [facility] for a higher level of care  [facility] and [facility], found to have SBO proximal jejunum transition point on CT and was transferred to [facility] for surgery  He underwent ex-lap and adhesions were removed  He had a large fascial defect with loss of domain due to his morbid obesity which required patient to be placed on wound VAC  He was transferred from OR to the ICU still intubated on vent  Transferred to hospitals for further management and care, possible plastic surg to close his abd  On arrival to hospitals, he is intubated on vent support, sedated with open abdomen with VAC  Morbidly obese  Plan: Inpatient admission for evaluation and treatment of acute resp failure, open abdomen with VAC, hx afib, hx DVT: Gen surg consulted- plan for OR tomorrow  NPO, remains intubated on vent support- 490/40%/18/8  Sedated  Cardizem gtt, Hold Heparin gtt  Cont IVF,  IV Ancef and Flagyl prior to OR tomorrow  Mon temp/WBC curve  Cont wound care      06/14 General Surgery Consult: SBO s/p ex lap HARVEY w/ Abthera placement 6/14  OR for 2nd look laparotomy, possible closure 6/15    Date: 06/15  Day 2:   Critical Care Notes:No acute events overnight  Plan for OR today with red surgery for second look and possible closure  NGT output-500cc  Remains intubated on MV support-AV/VC 18/490/50/8, sedated  Cont diltiazem gtt, hep gtt  NPO  IVF  Strict I/O  Wound care  PE: Intubated, sedated with open abdomen with abthera vac, sacral wounds present       OP Note:   SURGERY DATE: 6/15/2022  Procedure(s) (LRB):  LAPAROTOMY EXPLORATORY WITH REPAIR OF SEROSAL INJURY (N/A)  CHANGE DRESSING/VAC ABDOMEN (N/A)  Anesthesia Type:   General     Operative Findings:  Edematous bowel, large redundant and gas filled sigmoid colon     Multiple serosal tears in the small bowel, oversewed with 3-0 silk, one small enterotomy mid jejunum oversewed with 3-0 silk     YANCY drain placed at root of mesentery for Direct Peritoneal Resuscitation    ED Triage Vitals   Temperature Pulse Respirations Blood Pressure SpO2   06/14/22 1954 06/14/22 1757 06/14/22 1757 06/14/22 1954 06/14/22 1755   (!) 97 16 °F (36 2 °C) 82 (!) 5 112/62 99 %      Temp Source Heart Rate Source Patient Position - Orthostatic VS BP Location FiO2 (%)   06/15/22 0054 06/15/22 0054 -- -- 06/14/22 1932   Esophageal Monitor   40      Pain Score       --                 Wt Readings from Last 1 Encounters:   06/14/22 (!) 238 kg (525 lb)     Additional Vital Signs:   Date/Time Temp Pulse Resp BP MAP (mmHg) Arterial Line BP MAP SpO2 FiO2 (%) O2 Device   06/15/22 1145 -- -- -- -- -- -- -- -- -- Ventilator   06/15/22 1000 99 32 °F (37 4 °C) 122 Abnormal  -- 109/61 75 104/58 70 mmHg 98 % -- --   06/15/22 0900 99 32 °F (37 4 °C) 106 Abnormal  -- 91/61 76 110/56 70 mmHg 98 % -- --   06/15/22 0800 99 32 °F (37 4 °C) 112 Abnormal  21 84/58 Abnormal  70 114/60 74 mmHg 98 % -- --   06/15/22 0750 98 96 °F (37 2 °C) 116 Abnormal  21 -- -- 118/60 74 mmHg 98 % 40 Ventilator    O2 Device: Vt 490 Peep 8 at 06/15/22 0750   06/15/22 0736 -- -- -- -- -- -- -- 98 % -- --   06/15/22 0700 98 96 °F (37 2 °C) 108 Abnormal  -- -- -- 86/56 66 mmHg 98 % -- --   06/15/22 0600 98 96 °F (37 2 °C) 106 Abnormal  -- -- -- 90/54 64 mmHg Abnormal  97 % -- --   06/15/22 0500 98 96 °F (37 2 °C) 102 -- -- -- 88/52 62 mmHg Abnormal  98 % -- --   06/15/22 0400 98 6 °F (37 °C) 98 18 -- -- 94/54 66 mmHg 97 % 40 Ventilator   06/15/22 0300 98 6 °F (37 °C) 100 -- -- -- 94/56 68 mmHg 98 % -- --   06/15/22 0200 98 6 °F (37 °C) 104 -- -- -- 106/60 72 mmHg 98 % -- --   06/15/22 0100 98 24 °F (36 8 °C) 100 -- -- -- 96/56 68 mmHg 98 % -- --   06/15/22 0054 97 88 °F (36 6 °C) 98 18 -- -- 104/56 70 mmHg 98 % 40 Ventilator   06/15/22 0000 98 24 °F (36 8 °C) 100 18 -- -- 108/66 78 mmHg 98 % -- --   06/14/22 2300 97 88 °F (36 6 °C) 94 18 -- -- 104/66 78 mmHg 99 % -- --   06/14/22 2200 97 52 °F (36 4 °C) 92 21 -- -- 104/62 76 mmHg 99 % -- --   06/14/22 2148 97 52 °F (36 4 °C) 88 18 107/62 85 106/64 78 mmHg 99 % -- --   06/14/22 2100 97 16 °F (36 2 °C) Abnormal  90 18 -- -- 102/62 74 mmHg 99 % -- --   06/14/22 2029 97 16 °F (36 2 °C) Abnormal  90 18 -- -- 106/62 76 mmHg 98 % -- --   06/14/22 2000 97 16 °F (36 2 °C) Abnormal  88 20 -- -- 110/62 76 mmHg 99 % -- --   06/14/22 1954 97 16 °F (36 2 °C) Abnormal  88 20 112/62 74 108/62 76 mmHg 99 % -- --   06/14/22 1932 -- -- -- -- -- -- -- 100 % 40 Ventilator   06/14/22 1806 -- 84 6 Abnormal  -- -- 120/60 76 mmHg 99 % -- --   06/14/22 1800 -- 84 32 Abnormal  -- -- 118/60 76 mmHg 99 % -- --   06/14/22 1757 -- 82 5 Abnormal  -- -- 118/58 74 mmHg 99 % -- --     Pertinent Labs/Diagnostic Test Results:     Results from last 7 days   Lab Units 06/13/22  2111   SARS-COV-2  Negative     Results from last 7 days   Lab Units 06/15/22  0449 06/14/22  1847 06/14/22  1339 06/14/22  0556 06/13/22  0636   WBC Thousand/uL 9 64 9 04 5 18 4 93 6 10   HEMOGLOBIN g/dL 11 2* 11 4* 11 7* 10 9* 11 4*   HEMATOCRIT % 34 6* 34 9* 36 2* 34 0* 36 1*   PLATELETS Thousands/uL 168 162 168 157 170   NEUTROS ABS Thousands/µL 8 17*  --  4 31 3 38 4 26         Results from last 7 days   Lab Units 06/15/22  0650 06/15/22  0449 06/14/22  1847 06/14/22  1339 06/13/22  0636 06/12/22  0806 06/10/22  0544 06/09/22  0603   SODIUM mmol/L  --  136 135* 138 136 135*   < > 135*   POTASSIUM mmol/L 5 3 5 2 4 8 4 9 4 7 4 6   < > 3 8   CHLORIDE mmol/L  --  103 104 102 100 96*   < > 94*   CO2 mmol/L  --  32 26 26 31 35*   < > 36*   ANION GAP mmol/L  --  1* 5 10 5 4   < > 5   BUN mg/dL  --  6 6 7 6 6   < > 8   CREATININE mg/dL  --  0 65 0 62 0 65 0 73 0 76   < > 0 82   EGFR ml/min/1 73sq m  --  112 114 112 107 105   < > 102   CALCIUM mg/dL  --  8 7 9 0 8 1* 8 8 8 8   < > 9 1   CALCIUM, IONIZED mmol/L  --  1 16  --  1 07*  --   --   --   --    MAGNESIUM mg/dL  --  2 3  --  1 7 2 2 2 0  --  2 2   PHOSPHORUS mg/dL  --  4 0  --  3 5  --  4 0  --  3 9    < > = values in this interval not displayed       Results from last 7 days   Lab Units 06/15/22  0449 06/14/22  1847 06/14/22  1339 06/13/22  0636 06/12/22  0806   AST U/L 15 19 21 35 38   ALT U/L 29 32 30 44 57   ALK PHOS U/L 63 65 72 83 96   TOTAL PROTEIN g/dL 5 7* 5 9* 5 8* 6 7 6 8   ALBUMIN g/dL 2 3* 2 4* 2 5* 2 6* 2 7*   TOTAL BILIRUBIN mg/dL 0 70 1 04* 1 07* 0 60 0 70     Results from last 7 days   Lab Units 06/15/22  0450 06/15/22  0129 06/15/22  0012   POC GLUCOSE mg/dl 141* 146* 160*     Results from last 7 days   Lab Units 06/15/22  0449 06/14/22  1847 06/14/22  1339 06/13/22  0636 06/12/22  0806 06/11/22  0720 06/10/22  0544 06/09/22  0603   GLUCOSE RANDOM mg/dL 142* 176* 175* 127 135 128 138 140     Results from last 7 days   Lab Units 06/14/22  1354   PH ART  7 406   PCO2 ART mm Hg 36 9   PO2 ART mm Hg 178 5*   HCO3 ART mmol/L 22 7   BASE EXC ART mmol/L -1 6   O2 CONTENT ART mL/dL 17 4   O2 HGB, ARTERIAL % 98 3*   ABG SOURCE  Line, Arterial                 Results from last 7 days   Lab Units 06/08/22  2214 06/08/22  1913 06/08/22  1657   HS TNI 0HR ng/L  --   --  3   HS TNI 2HR ng/L  --  8  --    HSTNI D2 ng/L  --  5  --    HS TNI 4HR ng/L 3  --   --    HSTNI D4 ng/L 0  --   --          Results from last 7 days   Lab Units 06/15/22  0449 06/14/22  0700 06/13/22  2111   PROTIME seconds 14 9*  --   --    INR  1 22*  --   --    PTT seconds 32 22* 87*       Results from last 7 days   Lab Units 06/13/22  2111   INFLUENZA A PCR  Negative   INFLUENZA B PCR  Negative   RSV PCR  Negative       Past Medical History:   Diagnosis Date    Afib ([place] [address] )     Anemia     Anxiety     Cancer ([place] [address] )     Cardiac disease     Cellulitis     COPD (chronic obstructive pulmonary disease) ([place] [address] )     Depression     Diabetes mellitus ([place] [address] )     [facility]     GERD (gastroesophageal reflux disease)     HBP (high blood pressure)     Heart failure ([facility])     Hx of blood clots     Hypertension     Hypokalemia     Hypothyroid     Obesity     Psychiatric disorder      Present on Admission:   SBO      Admitting Diagnosis: Small bowel obstruction ([place] [address] ) [K56 [id number]]  Age/Sex: 47 y o  male  Admission Orders:  SCD  NPO  Cardio-Pulmonary Monitoring, Neuro Checks, Nursing dysphagia assesment, I/O, Daily weights, Vital signs  Wound care  Restraints non violent    Scheduled Medications:  [MAR Hold] cefazolin, 3,000 mg, Intravenous, On Call To OR  [MAR Hold] chlorhexidine, 15 mL, Mouth/Throat, Q12H [facility]  [MAR Hold] insulin lispro, 1-5 Units, Subcutaneous, Q6H [facility]  [MAR Hold] ipratropium, 0 5 mg, Nebulization, TID  [MAR Hold] levalbuterol, 1 25 mg, Nebulization, TID  [MAR Hold] pantoprazole, 40 mg, Intravenous, Q24H [facility]      Continuous IV Infusions:  diltiazem, 1-15 mg/hr, Intravenous, Titrated  fentaNYL, 100 mcg/hr, Intravenous, Continuous  heparin (porcine), 3-20 Units/kg/hr (Order-Specific), Intravenous, Titrated  multi-electrolyte, 125 mL/hr, Intravenous, Continuous  propofol, 5-50 mcg/kg/min, Intravenous, Titrated      PRN Meds:  [MAR Hold] fentanyl citrate (PF), 50 mcg, Intravenous, Q1H PRN  [MAR Hold] heparin (porcine), 2,000 Units, Intravenous, Q1H PRN  [MAR Hold] heparin (porcine), 4,000 Units, Intravenous, Q1H PRN  sodium chloride, , , PRN  sterile water, , , PRN        IP CONSULT TO ACUTE [facility] SURGERY    Network Utilization Review Department  ATTENTION: Please call with any questions or concerns to [phone] and carefully listen to the prompts so that you are directed to the right person  All voicemails are confidential   Rafael Sandra all requests for admission clinical reviews, approved or denied determinations and any other requests to dedicated fax number below belonging to the campus where the patient is receiving treatment   List of dedicated fax numbers for the Facilities:  [address] DENIALS (Administrative/Medical Necessity) [phone]   [address] (Maternity/NICU/Pediatrics) [phone]   [address] [place] [address]  [address] [facility] [place] [address] [address] [place] [address] [address] [facility] [facility] [phone]

## 2022-06-15 NOTE — NURSING NOTE
Report given to Harrison Alfaro RN by BLANCA Villafana and undersigned  pt left unit not in distress  All drips documented

## 2022-06-15 NOTE — OP NOTE
OPERATIVE REPORT  PATIENT NAME: Kathryn Piedra    :  1970  MRN: [id number]  Pt Location: BE OR ROOM 03    SURGERY DATE: 6/15/2022    Surgeon(s) and Role:     * Debborah Lundborg, MD - Primary     * Lj Toledo MD - Hugo Anderson MD - Assisting    Preop Diagnosis:  Small bowel obstruction ([address] ) [[id number]]    Post-Op Diagnosis Codes:     * Small bowel obstruction ([address] ) [[id number]]    Procedure(s) (LRB):  LAPAROTOMY EXPLORATORY WITH REPAIR OF SEROSAL INJURY (N/A)  CHANGE DRESSING/VAC ABDOMEN (N/A)    Specimen(s):  * No specimens in log *    Estimated Blood Loss:   Minimal    Drains:  Closed/Suction Drain Right Abdomen Bulb 24 Fr  (Active)   Number of days: 0       NG/OG/Enteral Tube Right nare (Active)   Placement Reverification Auscultation 06/15/22 0401   Site Assessment Intact 06/15/22 0401   Status Suction-low continuous 06/15/22 0401   Drainage Appearance Brown 06/15/22 0401   Output (mL) 150 mL 06/15/22 0401   Number of days: 4       Urethral Catheter Latex 16 Fr  (Active)   Reasons to continue Urinary Catheter  Accurate I&O assessment in critically ill patients (48 hr  max) 06/15/22 0748   Goal for Removal Voiding trial when ambulation improves 06/15/22 0100   Site Assessment Skin intact; Clean 06/15/22 0748   [place] Care Done 06/15/22 0900   Collection Container Standard drainage bag 06/15/22 0748   Securement Method Securing device (Describe) 06/15/22 0748   Output (mL) 100 mL 06/15/22 1001   Number of days: 1       Anesthesia Type:   General    Operative Indications:  Small bowel obstruction ([address] ) [[id number]]      Operative Findings:  Edematous bowel, large redundant and gas filled sigmoid colon    Multiple serosal tears in the small bowel, oversewed with 3-0 silk, one small enterotomy mid jejunum oversewed with 3-0 silk    YANCY drain placed at root of mesentery for Direct Peritoneal Resuscitation    Complications:   None    Procedure and Technique:  Patient was brought to the operative suite where his identified by wrist band  He was transferred to the OR table where ventilation was taken over per Anesthesia  The patient was then prepped and draped in the usual sterile fashion with Betadine  His previously applied at [facility] was removed and the total count was correct with 1 blue sponge and 1 blue octopus  At this time a time-out was held and all were in agreement  The bowel was noted to be edematous thus we decided to run the small bowel given the extensive lysis of adhesions from ligament of Treitz to terminal ileum  Located multiple small bowel serosal tears which were over sewed with 3-0 silk in a Lembert fashion  There was 1 small enterotomy noted in mid small bowel which was also over sewed with 3-0 silk in a Lembert fashion  At this time we then inspected the colon which was noted to be healthy and intact  Due to the edematous nature and large gas-filled redundant sigmoid colon it was decided again that the patient's abdomen was not capable of being closed even at the skin level nor with a bridging Vicryl mesh at this time  We thus decided to place a drain at the root of the mesentery to perform direct peritoneal resuscitation  The drain was then taken out in the right lower quadrant of the abdomen  The abdomen was then copiously irrigated with 4 L of sterile saline  An ABThera VAC was then applied with 1 blue octopus and 1 blue sponge  At the end of the case instrument count was correct x2 and RF Wand showed no retained sponges  The ABThera VAC was then hooked to suction with a good seal     The patient was then transferred to his ICU bed and back to the MICU in stable but critical condition per Anesthesia  Dr Alfredo Martinez was present for the entire procedure        Patient Disposition:  Critical Care Unit      SIGNATURE: Karmen Galicia MD  DATE: Alexandra 15, 2022  TIME: 1:36 PM

## 2022-06-15 NOTE — PROGRESS NOTES
Daily Progress Note - Critical Care   Harmony Chinchilla 47 y o  male MRN: [id number]  Unit/Bed#: MICU 05 Encounter: [id number]        ----------------------------------------------------------------------------------------  HPI: Harmony Chinchilla is a 47 y o  male who presents with open abdomen after surgery for SBO  Patient was initially seen at [place] [address] for abdominal pain and constipation  CT showed SBO and patient was transferred to [facility] for surgery  Patient underwent ex-lap and adhesions were removed  Patient had a large fascial defect with loss of domain due to his morbid obesity which required patient to be placed on wound VAC  Patient was transferred to the ICU and later decision was made to transfer to [facility] for further management and care     24hr events: No acute events overnight   Plan for OR today with red surgery for second look and possible closure     ---------------------------------------------------------------------------------------  SUBJECTIVE  Pt intubated and sedated    Review of Systems  Review of systems was unable to be performed secondary to intubation  ---------------------------------------------------------------------------------------  Assessment and Plan:    Neuro:    Analgesia/sedation  o Gtts: fentanyl 100, propofol 15  o PRN: fentanyl 50 Q1 for agitation  o delirium ppx:  - Goal RASS 0 to -1  - Daily awakening trial  - CAM ICU   Diagnosis: depression  o Plan: resume home buproprion when able      CV:    Diagnosis: atrial fibrillation with rapid ventricular response  o Home meds: metoprolol 100 BID, pradaxa  o Plan: diltiazem 5 and heparin gtt   Diagnosis: chronic diastolic heart failure  o Home meds: torsemide and aldactone  o Plan: diltiazem 5  o continuous monitoring, maintain MAP >65      Pulm:   Diagnosis: mechanical ventilation  o AV/VC 18/490/50/8  o Plan: maintain while on sedating meds  o SBT daily  o respiratory protocol   Diagnosis: COPD  o Plan: ipratropium TID, levalbuterol TID   Diagnosis: obesity hypoventilation syndrome  o Plan: reevaluate when extubated      GI:    Diagnosis: Small bowel obstruction  o S/p ex lap HARVEY with abthera placement 6/14  o NGT: 500cc  o Wound vac: 650cc  o Plan: to OR for 2nd look laparotomy, possible closure 6/15   Diagnosis: h/o bariatric surgery  o Plan: surgical team aware   GI ppx  o Home meds: pepcid  o Plan: Protonix IV        :    Diagnosis: no acute issues  o UOP: 760 since admission  o Plan: continue to trend BUN/creat  o Maintain carmen for OR today  o Strict I/O monitoring      F/E/N:    F: plasmalyte 125   E: monitor electrolytes and replete as needed to maintain K>4 0, Mg>2 0, Phos >3 0  o K:5 3, given insulin 5 and dextrose 25 to correct before OR   N: NPO for OR today      Heme/Onc:    Hemoglobin trend 11 2 from 11 4  o Plan: continue to monitor daily  o Transfuse for Hgb <7 0   Diagnosis: h/o DVT  o Home meds: pradaxa  o IVC filter- unable to be retrieved during surgery  o Plan: heparin drip      Endo:    Diagnosis: h/o hypothyroidism  o TSH: 1 78  o Plan: hold home levothyroxine while NPO   blood glucose control  o Most recent A1c: 5 3  o Plan: ISS, currently algorithm 2  o Goal -180      ID:    Diagnosis: no acute issues  o Plan: trend fever curve and WBC for evidence of infection      MSK/Skin:    Diagnosis: morbid obesity  o BMI 73 22  o H/o gastric sleeve  o Currently resides in nursing facility  o Plan: surgery team aware   Diagnosis: pressure ulcer, buttocks  o Plan: wound care   Pressure ulcer ppx  o Plan: frequent turning/repositioning and offloading  o PT/OT when appropriate  - Unclear baseline functional status    Patient appropriate for transfer out of the ICU today?: No  Disposition: Continue Critical Care   Code Status: Level 1 - Full Code  ---------------------------------------------------------------------------------------  ICU CORE MEASURES    Prophylaxis   VTE Pharmacologic Prophylaxis: Heparin Drip  VTE Mechanical Prophylaxis: sequential compression device  Stress Ulcer Prophylaxis: Pantoprazole IV     ABCDE Protocol (if indicated)  Plan to perform spontaneous awakening trial today? Yes  Plan to perform spontaneous breathing trial today? Yes  Obvious barriers to extubation? Yes  CAM-ICU: Negative    Invasive Devices Review  Invasive Devices  Report    Peripherally Inserted Central Catheter Line  Duration           PICC Line  Right Basilic 1 day          Central Venous Catheter Line  Duration           CVC Central Lines 22 Triple <1 day          Arterial Line  Duration           Arterial Line 22 Left Axillary <1 day          Drain  Duration           NG/OG/Enteral Tube Right nare 3 days    Urethral Catheter Latex 16 Fr  <1 day          Airway  Duration           ETT  Hi-Lo; Cuffed;Oral 8 mm <1 day              Can any invasive devices be discontinued today? Yes, axillary a-line and potentially wound vac  ---------------------------------------------------------------------------------------  OBJECTIVE    Vitals   Vitals:    06/15/22 0200 06/15/22 0300 06/15/22 0400 06/15/22 0500   BP:       Pulse: 104 100 98 102   Resp:   18    Temp: 98 6 °F (37 °C) 98 6 °F (37 °C) 98 6 °F (37 °C) 98 96 °F (37 2 °C)   TempSrc:   Esophageal    SpO2: 98% 98% 97% 98%     Temp (24hrs), Av 4 °F (36 3 °C), Min:96 3 °F (35 7 °C), Max:98 96 °F (37 2 °C)  Current: Temperature: 98 96 °F (37 2 °C)      Respiratory:  SpO2: SpO2: 98 %       Invasive/non-invasive ventilation settings   Respiratory  Report   Lab Data (Last 4 hours)    None         O2/Vent Data (Last 4 hours)    None                Physical Exam  Constitutional:       Appearance: He is obese  He is ill-appearing  HENT:      Head: Normocephalic and atraumatic  Mouth/Throat:      Mouth: Mucous membranes are moist       Pharynx: Oropharynx is clear     Eyes:      Conjunctiva/sclera: Conjunctivae normal       Pupils: Pupils are equal, round, and reactive to light  Cardiovascular:      Rate and Rhythm: Normal rate and regular rhythm  Pulses: Normal pulses  Heart sounds: Normal heart sounds  Pulmonary:      Breath sounds: Normal breath sounds  Comments: intubated  Abdominal:      Palpations: Abdomen is soft  Comments: Open abdomen with abthera vac   Musculoskeletal:         General: Normal range of motion  Cervical back: Normal range of motion  Skin:     General: Skin is warm and dry        Comments: Wound, buttocks   Neurological:      Comments: sedated             Laboratory and Diagnostics:  Results from last 7 days   Lab Units 06/15/22  0449 06/14/22  1847 06/14/22  1339 06/14/22  0556 06/13/22  0636 06/12/22  0530 06/11/22  0720   WBC Thousand/uL 9 64 9 04 5 18 4 93 6 10 5 13 5 72   HEMOGLOBIN g/dL 11 2* 11 4* 11 7* 10 9* 11 4* 11 4* 12 7   HEMATOCRIT % 34 6* 34 9* 36 2* 34 0* 36 1* 35 6* 41 4   PLATELETS Thousands/uL 168 162 168 157 170 145* 151   NEUTROS PCT % 86*  --  84* 69 70  --   --    MONOS PCT % 7  --  7 9 11  --   --      Results from last 7 days   Lab Units 06/15/22  0449 06/14/22  1847 06/14/22  1339 06/13/22  0636 06/12/22  0806 06/11/22  0720 06/10/22  0544   SODIUM mmol/L 136 135* 138 136 135* 134* 135*   POTASSIUM mmol/L 5 2 4 8 4 9 4 7 4 6 4 7 4 2   CHLORIDE mmol/L 103 104 102 100 96* 98* 95*   CO2 mmol/L 32 26 26 31 35* 28 35*   ANION GAP mmol/L 1* 5 10 5 4 8 5   BUN mg/dL 6 6 7 6 6 7 7   CREATININE mg/dL 0 65 0 62 0 65 0 73 0 76 0 70 0 83   CALCIUM mg/dL 8 7 9 0 8 1* 8 8 8 8 9 1 8 7   GLUCOSE RANDOM mg/dL 142* 176* 175* 127 135 128 138   ALT U/L 29 32 30 44 57  --   --    AST U/L 15 19 21 35 38  --   --    ALK PHOS U/L 63 65 72 83 96  --   --    ALBUMIN g/dL 2 3* 2 4* 2 5* 2 6* 2 7*  --   --    TOTAL BILIRUBIN mg/dL 0 70 1 04* 1 07* 0 60 0 70  --   --      Results from last 7 days   Lab Units 06/15/22  0449 06/14/22  1339 06/13/22  0636 06/12/22  0806 06/09/22  0603   MAGNESIUM mg/dL 2 3 1 7 2 2 2 0 2 2 PHOSPHORUS mg/dL 4 0 3 5  --  4 0 3 9      Results from last 7 days   Lab Units 06/15/22  0449 22  0700 22  2111 22  1329 22  0636 22  2352 22  1719   INR  1 22*  --   --   --   --   --   --    PTT seconds 32 22* 87* 42* 36 38* 69*              ABG:  Results from last 7 days   Lab Units 22  1354   PH ART  7 406   PCO2 ART mm Hg 36 9   PO2 ART mm Hg 178 5*   HCO3 ART mmol/L 22 7   BASE EXC ART mmol/L -1 6   ABG SOURCE  Line, Arterial     VBG:  Results from last 7 days   Lab Units 22  1354   ABG SOURCE  Line, Arterial           Micro        EK/4 Impression  Atrial fibrillation with rapid ventricular response  Low voltage QRS  Septal infarct , age undetermined  Nonspecific T wave abnormality  Imaging: No new images I have personally reviewed pertinent films in PACS    Intake and Output  I/O        0701   0700  0701  06/15 07    I V   1861 1    Total Intake  1861 1    Urine  760    Emesis/NG output  500    Drains  650    Total Output  1910    Net  -48 9                  Height and Weights         There is no height or weight on file to calculate BMI  Weight (last 2 days)     None            Nutrition       Diet Orders   (From admission, onward)             Start     Ordered    22  Diet NPO  Diet effective now        References:    Nutrtion Support Algorithm Enteral vs  Parenteral   Question Answer Comment   Diet Type NPO    RD to adjust diet per protocol?  No        22 1835    22 183  Room Service  Once        Question:  Type of Service  Answer:  Room Service-Appropriate    22                    Active Medications  Scheduled Meds:  Current Facility-Administered Medications   Medication Dose Route Frequency Provider Last Rate    cefazolin  3,000 mg Intravenous On Call To Iraida Pollard MD      chlorhexidine  15 mL Mouth/Throat Q12H [facility] Sapphire Bernal MD      diltiazem  1-15 mg/hr Intravenous Titrated RADHA Gerard Stopped (06/14/22 2148)    fentaNYL  100 mcg/hr Intravenous Continuous Ant Lee  mcg/hr (06/15/22 0000)    fentanyl citrate (PF)  50 mcg Intravenous Q1H PRN RADHA Diane      heparin (porcine)  3-20 Units/kg/hr (Order-Specific) Intravenous Titrated Frank Rodriguez MD 11 1 Units/kg/hr (06/15/22 0454)    heparin (porcine)  2,000 Units Intravenous Q1H PRN Frank Rodriguez MD      heparin (porcine)  4,000 Units Intravenous Q1H PRN Frank Rodriguez MD      insulin lispro  1-5 Units Subcutaneous Q6H [address] Ant Lee MD      ipratropium  0 5 mg Nebulization TID Frank Rodriguez MD      levalbuterol  1 25 mg Nebulization TID Frank Rodriguez MD      metroNIDAZOLE  500 mg Intravenous On Call To David Maradiaga MD      multi-electrolyte  125 mL/hr Intravenous Continuous Frank Rodriguez  mL/hr (06/15/22 0547)    pantoprazole  40 mg Intravenous Q24H [address] Ant Lee MD      propofol  5-50 mcg/kg/min Intravenous Titrated RADHA Brito 25 mcg/kg/min (06/15/22 0351)     Continuous Infusions:  diltiazem, 1-15 mg/hr, Last Rate: Stopped (06/14/22 2148)  fentaNYL, 100 mcg/hr, Last Rate: 100 mcg/hr (06/15/22 0000)  heparin (porcine), 3-20 Units/kg/hr (Order-Specific), Last Rate: 11 1 Units/kg/hr (06/15/22 0454)  multi-electrolyte, 125 mL/hr, Last Rate: 125 mL/hr (06/15/22 0547)  propofol, 5-50 mcg/kg/min, Last Rate: 25 mcg/kg/min (06/15/22 0351)      PRN Meds:   fentanyl citrate (PF), 50 mcg, Q1H PRN  heparin (porcine), 2,000 Units, Q1H PRN  heparin (porcine), 4,000 Units, Q1H PRN        Allergies   Allergies   Allergen Reactions    Penicillins Hives     ---------------------------------------------------------------------------------------  Advance Directive and Living Will:      Power of :    POLST:    ---------------------------------------------------------------------------------------    Yulissa Acuna, MS-4  [place]/[address]  [address] [employer]      Portions of the record may have been created with voice recognition software  Occasional wrong word or "sound a like" substitutions may have occurred due to the inherent limitations of voice recognition software    Read the chart carefully and recognize, using context, where substitutions have occurred

## 2022-06-15 NOTE — CONSULTS
Consultation - General Surgery  Cici Benavides 47 y o  male MRN: [id number]  Unit/Bed#: ICU 07 Encounter: [id number]        Assessment/Plan     Assessment:  47 M w/ PMH of BMI 73, Afib, DVT, COPD, CHF, large ventral hernia and SBO s/p ex lap HARVEY w/ Abthera placement 6/14  Plan:   OR for 2nd look laparotomy, possible closure 6/15  o Consent obtained  o Risks and benefits discussed   Appreciate ICU care   Please tigertext on call red surgery resident role or acute care floor call role with any questions      History of Present Illness     HPI:  Cici Benavieds is a 47 y o  male with multiple comorbidities who presented to [facility] on 6 /6 with SBO  Past surgical history includes gastric sleeve 08/ 2018 Dr Kae Mcgee, laparoscopic ventral hernia repair 2019 and  Exploratory laparotomy secondary to SBO (2021)  Conservative management was trialed but the patient had persistent high NGT output  He had bowel function during this time but symptoms recurred  He ultimately went to the OR on 6/14 with lysis of adhesions  No bowel was resected  An abthera was placed and the patient was transferred to [facility] due to likely need for complex closure and management of open abdomen        Review of Systems   Unable to perform ROS: Intubated         Historical Information   Past Medical History:   Diagnosis Date    Afib [facility]     Anemia     Anxiety     Cancer ([facility]     Cardiac disease     Cellulitis     COPD (chronic obstructive pulmonary disease) ([facility]     Depression     Diabetes mellitus ([facility]     DVT (deep venous thrombosis) (HCC)     GERD (gastroesophageal reflux disease)     HBP (high blood pressure)     Heart failure (HCC)     Hx of blood clots     Hypertension     Hypokalemia     Hypothyroid     Obesity     Psychiatric disorder      Past Surgical History:   Procedure Laterality Date    ABDOMINAL HERNIA REPAIR  05/2019    CHOLECYSTECTOMY      Lap    GASTRECTOMY SLEEVE LAPAROSCOPIC  08/2018    INCISION AND DRAINAGE OF WOUND Bilateral 12/01/2021    Procedure: INCISION AND DRAINAGE (I&D) HEAD/FACE;  Surgeon: Nahun Nguyễn MD;  Location: [facility] MAIN OR;  Service: General    IR PICC PLACEMENT DOUBLE LUMEN  06/13/2022    IVC FILTER INSERTION  2018    KNEE SURGERY Right     open wound, injury     LEG SURGERY Right 2009    US GUIDED VASCULAR ACCESS  08/06/2018     Social History   Social History     Substance and Sexual Activity   Alcohol Use Not Currently     Social History     Substance and Sexual Activity   Drug Use No     Social History     Tobacco Use   Smoking Status Former Smoker    Packs/day: 1 00    Years: 15 00    Pack years: 15 00   Smokeless Tobacco Never Used   Tobacco Comment    1 5ppd x 23 years, quit 2014     Family History:   Family History   Problem Relation Age of Onset    Lung cancer Father     Diabetes Maternal Aunt     Heart attack Mother     Clotting disorder Mother     Hypertension Mother     Heart failure Mother     Diabetes Mother     Atrial fibrillation Mother     Sleep apnea Mother     Obesity Mother     Asthma Sister     Stroke Neg Hx     Anuerysm Neg Hx     Arrhythmia Neg Hx     Hyperlipidemia Neg Hx         pt unsure    Fainting Neg Hx        Meds/Allergies   all medications and allergies reviewed  Allergies   Allergen Reactions    Penicillins Hives       Objective   First Vitals:   Blood Pressure: 112/62 (06/14/22 1954)  Pulse: 82 (06/14/22 1757)  Temperature: (!) 97 16 °F (36 2 °C) (06/14/22 1954)  Respirations: (!) 5 (06/14/22 1757)  SpO2: 99 % (06/14/22 1755)    Current Vitals:   Blood Pressure: 112/62 (06/14/22 1954)  Pulse: 88 (06/14/22 1954)  Temperature: (!) 97 16 °F (36 2 °C) (06/14/22 1954)  Respirations: 20 (06/14/22 1954)  SpO2: 99 % (06/14/22 1954)      Intake/Output Summary (Last 24 hours) at 6/14/2022 2017  Last data filed at 6/14/2022 1954  Gross per 24 hour   Intake 172 22 ml   Output 525 ml   Net -352 78 ml       Invasive Devices  Report    Peripherally Inserted Central Catheter Line  Duration           PICC Line 46/53/93 Right Basilic 1 day          Central Venous Catheter Line  Duration           CVC Central Lines 06/14/22 Triple <1 day          Peripheral Intravenous Line  Duration           Peripheral IV 06/11/22 Left Forearm 3 days          Arterial Line  Duration           Arterial Line 06/14/22 Left Axillary <1 day          Drain  Duration           NG/OG/Enteral Tube Right nare 3 days    Urethral Catheter Latex 16 Fr  <1 day          Airway  Duration           ETT  Hi-Lo; Cuffed;Oral 8 mm <1 day                Physical Exam  Vitals reviewed  Constitutional:       General: He is not in acute distress  Appearance: He is not toxic-appearing  HENT:      Head: Normocephalic and atraumatic  Right Ear: External ear normal       Left Ear: External ear normal       Nose: Nose normal       Mouth/Throat:      Mouth: Mucous membranes are moist       Pharynx: Oropharynx is clear  Eyes:      Extraocular Movements: Extraocular movements intact  Conjunctiva/sclera: Conjunctivae normal       Pupils: Pupils are equal, round, and reactive to light  Cardiovascular:      Rate and Rhythm: Normal rate and regular rhythm  Pulmonary:      Comments: CMV  490, PEEP 8, 40%  Abdominal:      General: Abdomen is flat  There is no distension  Comments: abthera in place   Musculoskeletal:         General: No swelling or deformity  Normal range of motion  Cervical back: Neck supple  No rigidity  Skin:     General: Skin is warm and dry  Capillary Refill: Capillary refill takes less than 2 seconds  Findings: No erythema  Neurological:      Comments: Intubated sedated           Lab Results: I have personally reviewed pertinent lab results  Imaging: I have personally reviewed pertinent reports  EKG, Pathology, and Other Studies: I have personally reviewed pertinent reports        Code Status: Level 1 - Full Code  Advance Directive and Living Will:      Power of :    POLST:

## 2022-06-15 NOTE — QUICK NOTE
Post-Op Check Note  Assessment: Leander Brown is a 53yo M who presented with small bowel obstruction s/p ex lap with extensive lysis of adhesions and application of abthera wound vac on 6/14 now s/p  Procedure(s) (LRB):  LAPAROTOMY EXPLORATORY WITH REPAIR OF SEROSAL INJURY (N/A)  CHANGE DRESSING/VAC ABDOMEN (N/A)    EBL: minimal    S: Pt returned from the OR without any post-operative complications  He returned intubated but remains off pressors  Jeff  was in place over the open abdomen and on suction with a good seal  YANCY drain noted from the RLQ  O:   Vitals:    06/15/22 1515   BP: 110/58   Pulse: (!) 112   Resp:    Temp: 99 °F (37 2 °C)   SpO2: 98%       I/O last 3 completed shifts:   In: 1861 1 [I V :1861 1]  Out: 1910 [Urine:760; Emesis/NG output:500; Drains:650]  I/O this shift:  In: 1036 3 [I V :936 3; IV Piggyback:100]  Out: 1190 [Urine:540; Drains:650]    PE:  Morbidly obese  NAD, alert and intubated but following commands in all 4 extremities  Normocephalic, atraumatic  MMM, EOMI, PERRLA  Intubated, b/l breath sounds  RRR  Abd soft, NT/ND  No calf tenderness or peripheral edema  Motor/sensation intact in distal extremities  CN grossly intact  -rash/lesions      Lab Results   Component Value Date    WBC 10 59 (H) 06/15/2022    HGB 12 1 06/15/2022    HCT 37 9 06/15/2022    MCV 94 06/15/2022     06/15/2022     Lab Results   Component Value Date    GLUCOSE 110 08/25/2016    CALCIUM 8 7 06/15/2022    K 4 7 06/15/2022    CO2 28 06/15/2022     06/15/2022    BUN 8 06/15/2022    CREATININE 0 78 06/15/2022         A/P: 47 y o  with small bowel obstruction s/p ex lap with extensive lysis of adhesions and application of abthera wound vac on 6/14 now s/p  Procedure(s) (LRB):  LAPAROTOMY EXPLORATORY WITH REPAIR OF SEROSAL INJURY (N/A)  CHANGE DRESSING/VAC ABDOMEN (N/A)    - direct peritoneal resuscitation through YANCY drain   - 500ml/hr for first hour, 125ml/hr after first hour until return to OR  - Diet NPO  - pain control with fentynal 50 PRN  - nausea control PRN  - SBT, wean to extubate  - strict I/Os  - f/u post op CBC and BMP  - restarting home meds once extubated  - PT/OT  - SQH/SCDs    Angel Luis Ruiz, MS-4

## 2022-06-15 NOTE — ANESTHESIA PREPROCEDURE EVALUATION
Procedure:  LAPAROTOMY EXPLORATORY (N/A Abdomen)  CHANGE DRESSING/VAC ABDOMEN (N/A Abdomen)  CLOSURE WOUND ABDOMINAL/TRUNK (N/A Abdomen)      47year old initially presented to [facility] for abdominal pain  imgaging showed SBO and pt was transferred to [facility] for surgery  After undergoing exlap with lysis of adhesions, patient was placed on wounc VAC due to large fascial defect and loss of domain    Patient was then transferred to [facility] for further mgmt    Fentanyl drip 100mcg/hr  Propofol 15/mcg/kg/min  Metoprolol 100mg BID for afib with RVR  Diltiazem drip 2 5mg/hr  Heparin gtt    8 0 ETT with hi-lo placed with macgrath 4 via RSI  VC 18/490/50/8  Relevant Problems   CARDIO   (+) Atrial fibrillation with RVR (HCC)   (+) Atrial fibrillation with rapid ventricular response (HCC)   (+) Hypertension      ENDO   (+) Hypothyroidism      GI/HEPATIC   (+) SBO      NEURO/PSYCH   (+) Depression   (+) History of DVT (deep vein thrombosis)      PULMONARY   (+) COPD (chronic obstructive pulmonary disease) (HCC)   (+) Chronic respiratory failure with hypoxia (HCC)            Latest Reference Range & Units 06/15/22 04:49   Sodium 136 - 145 mmol/L 136   Potassium 3 5 - 5 3 mmol/L 5 2   Chloride 100 - 108 mmol/L 103   CO2 21 - 32 mmol/L 32   Anion Gap 4 - 13 mmol/L 1 (L)   BUN 5 - 25 mg/dL 6   Creatinine 0 60 - 1 30 mg/dL 0 65 [1]   Glucose, Random 65 - 140 mg/dL 142 (H) [2]   Calcium 8 3 - 10 1 mg/dL 8 7   CORRECTED CALCIUM 8 3 - 10 1 mg/dL 10 1   AST 5 - 45 U/L 15 [3]   ALT 12 - 78 U/L 29 [4]   Alkaline Phosphatase 46 - 116 U/L 63   Total Protein 6 4 - 8 2 g/dL 5 7 (L)   Albumin 3 5 - 5 0 g/dL 2 3 (L)   TOTAL BILIRUBIN 0 20 - 1 00 mg/dL 0 70 [5]   eGFR ml/min/1 73sq m 112   Phosphorus 2 7 - 4 5 mg/dL 4 0   Magnesium 1 6 - 2 6 mg/dL 2 3        Latest Reference Range & Units 06/15/22 04:49   WBC 4 31 - 10 16 Thousand/uL 9 64   Red Blood Cell Count 3 88 - 5 62 Million/uL 3 72 (L)   Hemoglobin 12 0 - 17 0 g/dL 11 2 (L)   HCT 36 5 - 49 3 % 34 6 (L)   MCV 82 - 98 fL 93   MCH 26 8 - 34 3 pg 30 1   MCHC 31 4 - 37 4 g/dL 32 4   RDW 11 6 - 15 1 % 14 8   Platelet Count 996 - 390 Thousands/uL 168   MPV 8 9 - 12 7 fL 10 3   nRBC /100 WBCs 0       ECHO (4/2022)      Left Ventricle: Left ventricular cavity size is normal  Wall thickness is normal  The left ventricular ejection fraction is 55%  Systolic function is normal  Wall motion cannot be accurately assessed      Very technically limited study due to patient body habitus  Left ventricular systolic function appears grossly normal  There is no diagnostic evidence of any severe stenotic or regurgitant valvular lesions      Anesthesia Plan  ASA Score- 4     Anesthesia Type- general with ASA Monitors  Additional Monitors:   Airway Plan:     Comment: Use of insitu ET tube  Plan to leave endotracheal tube in place for transport back to the ICU postop    Npo after MN    Active type and screen available    Anesthesia consent implied with surgical consent that was given yesterday - only 1 number given for a person to contact and have been unable to reach this person or even leave a message  Plan Factors-Exercise tolerance (METS): <4 METS  Chart reviewed  Existing labs reviewed  Patient summary reviewed  Induction- intravenous  Postoperative Plan-     Informed Consent- Plan/risks discussed with: Tre Sandoval [phone]  I personally reviewed this patient with the CRNA  Discussed and agreed on the Anesthesia Plan with the CRNA  Simona Li

## 2022-06-15 NOTE — RESPIRATORY THERAPY NOTE
06/15/22 0342   Respiratory Assessment   Resp Comments Patient received from ICU orally intubated, on assisted ventilation  He is on (S)CMV, RR 18, Vt 490 (6 5cc/kg), PEEP 8, FiO2 40%, Pplat 20  WIll monitor closely overnight  O2 Device VENT   Vent Information   Vent ID [id number]   Vent type Wray C3   Wray C3/G5 Vent Mode (S)CMV   $ Vent Daily Charge-Subsequent Yes   $ Pulse Oximetry Spot Check Charge Completed   (S)CMV Settings   Resp Rate (BPM) 18 BPM   VT (mL) 490 mL   FIO2 (%) 40 %   PEEP (cmH2O) 8 cmH2O   I:E Ratio 1:2 3   Insp Time (%)   (1 sec)   Flow Trigger (LPM) 3   Humidification Heater   Heater Temperature (Set) 95 °F (35 °C)   (S)CMV Actuals   Resp Rate (BPM) 18 BPM   VT (mL) 436   MV 7 8   MAP (cmH2O) 13 cmH2O   Peak Pressure (cmH2O) 27 cmH2O   I:E Ratio (Obs) 1:2 3   Insp Resistance 8   Heater Temperature (Obs) 95 °F (35 °C)   Static Compliance (mL/cmH20) 28 mL/cmH2O   Plateau Pressure (cm H2O) 20 cm H2O   (S)CMV Alarms   High Peak Pressure (cmH2O) 40   Low Pressure (cmH2O) 5 cm H2O   High Resp Rate (BPM) 40 BPM   Low Resp Rate (BPM) 8 BPM   High MV (L/min) 20 L/min   Low MV (L/min) 4 L/min   High VT (mL) 1000 mL   Low VT (mL) 250 mL   Apnea Time (s) 20 S   Maintenance   Alarm (pink) cable attached No   Resuscitation bag with peep valve at bedside Yes   Water bag changed No   Circuit changed No   IHI Ventilator Associated Pneumonia Bundle   Daily Assessment of Readiness to Extubate Yes   Head of Bed Elevated HOB 30   ETT  Hi-Lo; Cuffed;Oral 8 mm   Placement Date/Time: 06/14/22 0819   Mask Ventilation: Mask ventilation not attempted (0)  Preoxygenated: Yes  Technique: Stylet;Rapid sequence;Video laryngoscopy  Type: Hi-Lo; Cuffed;Oral  Tube Size: 8 mm  Laryngoscope: Onstream Media  Blade Size: 4  Locatio       Secured at (cm) 23   Measured from Teeth   Secured Location Left   Repositioned Right to Left   Secured by Commercial tube owens   Cuff Pressure (cm H2O) 28 cm H2O   HI-LO Suction  Intermittent suction   HI-LO Secretions Small   HI-LO Intervention Patent

## 2022-06-15 NOTE — PROGRESS NOTES
S: Courtney Wahl is a 47 y o  male who returns to the ICU s/p 2nd look laparotomy w repair of serosal injury, washout, VAC dressing change, initiation of DPR  EBL minimal  Pt tolerated the procedure well, without complications, returned to the ICU intubated and sedated  Pt remains intubated and sedated  No acute events since surgery      O:    Vitals:    06/15/22 1515   BP: 110/58   Pulse: (!) 112   Resp:    Temp: 99 °F (37 2 °C)   SpO2: 98%     Psupport 12/8 40%  DPR running  Hep gtt  Cardizem gtt  Prop/fent    General: Intubated, sedated  Skin: Warm, dry, anicteric  HEENT: Normocephalic, atraumatic  CV: irregular/afib  Pulm: CTA b/l, no inc WOB  Abd: Soft, ND/NT, open abdomen w VAC in place, DPR running  MSK: Symmetric, no edema, no tenderness, no deformity    A: 52yoM w multiple co-morbidities, super-morbid obesity w BMI 73, s/p ex-lap and extensive HARVEY for SBO, abthera vac placement due to inability to close abdomen 2/2 significant bowel distension and chronically incarcerated ventral hernia with loss of domain at [facility], transferred to [facility] ICU, now POD0 s/p 2nd look laparotomy w repair of serosal injury, washout, VAC dressing change, initiation of DPR    P:  - plan for takeback to OR Friday 6/17 for washout, VAC change vs possible closure, possible mesh  - DPR  - abdominal VAC, monitor output and character  - hep gtt  - cardizem gtt  - psupport, wean vent as tolerated per icu  - NPO/NGT  - arterial line for BP monitoring  - carmen for accurate I/O

## 2022-06-16 LAB
ANION GAP SERPL CALCULATED.3IONS-SCNC: 4 MMOL/L (ref 4–13)
APTT PPP: 33 SECONDS (ref 23–37)
APTT PPP: 35 SECONDS (ref 23–37)
APTT PPP: 43 SECONDS (ref 23–37)
BASE EX.OXY STD BLDV CALC-SCNC: 72 % (ref 60–80)
BASE EXCESS BLDV CALC-SCNC: 4.7 MMOL/L
BASOPHILS # BLD AUTO: 0.03 THOUSANDS/ΜL (ref 0–0.1)
BASOPHILS NFR BLD AUTO: 0 % (ref 0–1)
BUN SERPL-MCNC: 12 MG/DL (ref 5–25)
CALCIUM SERPL-MCNC: 8.4 MG/DL (ref 8.3–10.1)
CHLORIDE SERPL-SCNC: 102 MMOL/L (ref 100–108)
CO2 SERPL-SCNC: 31 MMOL/L (ref 21–32)
CREAT SERPL-MCNC: 0.9 MG/DL (ref 0.6–1.3)
EOSINOPHIL # BLD AUTO: 0.04 THOUSAND/ΜL (ref 0–0.61)
EOSINOPHIL NFR BLD AUTO: 0 % (ref 0–6)
ERYTHROCYTE [DISTWIDTH] IN BLOOD BY AUTOMATED COUNT: 14.8 % (ref 11.6–15.1)
GFR SERPL CREATININE-BSD FRML MDRD: 98 ML/MIN/1.73SQ M
GLUCOSE SERPL-MCNC: 110 MG/DL (ref 65–140)
GLUCOSE SERPL-MCNC: 116 MG/DL (ref 65–140)
GLUCOSE SERPL-MCNC: 126 MG/DL (ref 65–140)
GLUCOSE SERPL-MCNC: 128 MG/DL (ref 65–140)
GLUCOSE SERPL-MCNC: 130 MG/DL (ref 65–140)
HCO3 BLDV-SCNC: 31.5 MMOL/L (ref 24–30)
HCT VFR BLD AUTO: 33.4 % (ref 36.5–49.3)
HGB BLD-MCNC: 10.7 G/DL (ref 12–17)
HOROWITZ INDEX BLDA+IHG-RTO: 40 MM[HG]
IMM GRANULOCYTES # BLD AUTO: 0.13 THOUSAND/UL (ref 0–0.2)
IMM GRANULOCYTES NFR BLD AUTO: 1 % (ref 0–2)
LACTATE SERPL-SCNC: 1.1 MMOL/L (ref 0.5–2)
LYMPHOCYTES # BLD AUTO: 1.11 THOUSANDS/ΜL (ref 0.6–4.47)
LYMPHOCYTES NFR BLD AUTO: 10 % (ref 14–44)
MAGNESIUM SERPL-MCNC: 2.2 MG/DL (ref 1.6–2.6)
MCH RBC QN AUTO: 30.1 PG (ref 26.8–34.3)
MCHC RBC AUTO-ENTMCNC: 32 G/DL (ref 31.4–37.4)
MCV RBC AUTO: 94 FL (ref 82–98)
MONOCYTES # BLD AUTO: 1.14 THOUSAND/ΜL (ref 0.17–1.22)
MONOCYTES NFR BLD AUTO: 10 % (ref 4–12)
NEUTROPHILS # BLD AUTO: 9.08 THOUSANDS/ΜL (ref 1.85–7.62)
NEUTS SEG NFR BLD AUTO: 79 % (ref 43–75)
NRBC BLD AUTO-RTO: 0 /100 WBCS
O2 CT BLDV-SCNC: 11.5 ML/DL
PCO2 BLDV: 58 MM HG (ref 42–50)
PEEP RESPIRATORY: 8 CM[H2O]
PH BLDV: 7.35 [PH] (ref 7.3–7.4)
PHOSPHATE SERPL-MCNC: 4.4 MG/DL (ref 2.7–4.5)
PLATELET # BLD AUTO: 197 THOUSANDS/UL (ref 149–390)
PMV BLD AUTO: 10.4 FL (ref 8.9–12.7)
PO2 BLDV: 40.4 MM HG (ref 35–45)
POTASSIUM SERPL-SCNC: 4.6 MMOL/L (ref 3.5–5.3)
RBC # BLD AUTO: 3.55 MILLION/UL (ref 3.88–5.62)
SODIUM SERPL-SCNC: 137 MMOL/L (ref 136–145)
VENT AC: 18
VENT- AC: AC
VT SETTING VENT: 490 ML
WBC # BLD AUTO: 11.53 THOUSAND/UL (ref 4.31–10.16)

## 2022-06-16 PROCEDURE — 83605 ASSAY OF LACTIC ACID: CPT | Performed by: NURSE PRACTITIONER

## 2022-06-16 PROCEDURE — 99291 CRITICAL CARE FIRST HOUR: CPT | Performed by: SURGERY

## 2022-06-16 PROCEDURE — 82805 BLOOD GASES W/O2 SATURATION: CPT | Performed by: NURSE PRACTITIONER

## 2022-06-16 PROCEDURE — 85730 THROMBOPLASTIN TIME PARTIAL: CPT | Performed by: SURGERY

## 2022-06-16 PROCEDURE — C9113 INJ PANTOPRAZOLE SODIUM, VIA: HCPCS | Performed by: SURGERY

## 2022-06-16 PROCEDURE — 84100 ASSAY OF PHOSPHORUS: CPT | Performed by: PHYSICIAN ASSISTANT

## 2022-06-16 PROCEDURE — 94640 AIRWAY INHALATION TREATMENT: CPT

## 2022-06-16 PROCEDURE — 82948 REAGENT STRIP/BLOOD GLUCOSE: CPT

## 2022-06-16 PROCEDURE — 83735 ASSAY OF MAGNESIUM: CPT | Performed by: PHYSICIAN ASSISTANT

## 2022-06-16 PROCEDURE — 85730 THROMBOPLASTIN TIME PARTIAL: CPT | Performed by: STUDENT IN AN ORGANIZED HEALTH CARE EDUCATION/TRAINING PROGRAM

## 2022-06-16 PROCEDURE — 94003 VENT MGMT INPAT SUBQ DAY: CPT

## 2022-06-16 PROCEDURE — 85025 COMPLETE CBC W/AUTO DIFF WBC: CPT | Performed by: PHYSICIAN ASSISTANT

## 2022-06-16 PROCEDURE — 80048 BASIC METABOLIC PNL TOTAL CA: CPT | Performed by: PHYSICIAN ASSISTANT

## 2022-06-16 PROCEDURE — 94760 N-INVAS EAR/PLS OXIMETRY 1: CPT

## 2022-06-16 PROCEDURE — 99024 POSTOP FOLLOW-UP VISIT: CPT | Performed by: SURGERY

## 2022-06-16 RX ORDER — METOPROLOL TARTRATE 5 MG/5ML
5 INJECTION INTRAVENOUS EVERY 6 HOURS
Status: DISCONTINUED | OUTPATIENT
Start: 2022-06-16 | End: 2022-06-17

## 2022-06-16 RX ORDER — OXYCODONE HCL 5 MG/5 ML
10 SOLUTION, ORAL ORAL EVERY 6 HOURS
Status: DISCONTINUED | OUTPATIENT
Start: 2022-06-16 | End: 2022-06-18

## 2022-06-16 RX ORDER — SODIUM CHLORIDE, SODIUM GLUCONATE, SODIUM ACETATE, POTASSIUM CHLORIDE, MAGNESIUM CHLORIDE, SODIUM PHOSPHATE, DIBASIC, AND POTASSIUM PHOSPHATE .53; .5; .37; .037; .03; .012; .00082 G/100ML; G/100ML; G/100ML; G/100ML; G/100ML; G/100ML; G/100ML
1000 INJECTION, SOLUTION INTRAVENOUS ONCE
Status: COMPLETED | OUTPATIENT
Start: 2022-06-16 | End: 2022-06-16

## 2022-06-16 RX ORDER — ALBUMIN, HUMAN INJ 5% 5 %
12.5 SOLUTION INTRAVENOUS ONCE
Status: COMPLETED | OUTPATIENT
Start: 2022-06-16 | End: 2022-06-16

## 2022-06-16 RX ORDER — CEFAZOLIN SODIUM 2 G/50ML
2000 SOLUTION INTRAVENOUS
Status: DISPENSED | OUTPATIENT
Start: 2022-06-17 | End: 2022-06-18

## 2022-06-16 RX ORDER — METRONIDAZOLE 500 MG/100ML
500 INJECTION, SOLUTION INTRAVENOUS
Status: COMPLETED | OUTPATIENT
Start: 2022-06-17 | End: 2022-06-17

## 2022-06-16 RX ORDER — FENTANYL CITRATE 50 UG/ML
50 INJECTION, SOLUTION INTRAMUSCULAR; INTRAVENOUS
Status: DISCONTINUED | OUTPATIENT
Start: 2022-06-16 | End: 2022-06-18

## 2022-06-16 RX ADMIN — ALBUMIN (HUMAN) 12.5 G: 12.5 INJECTION, SOLUTION INTRAVENOUS at 03:00

## 2022-06-16 RX ADMIN — OXYCODONE HYDROCHLORIDE 10 MG: 5 SOLUTION ORAL at 21:19

## 2022-06-16 RX ADMIN — OXYCODONE HYDROCHLORIDE 10 MG: 5 SOLUTION ORAL at 15:44

## 2022-06-16 RX ADMIN — CHLORHEXIDINE GLUCONATE 15 ML: 1.2 SOLUTION ORAL at 21:06

## 2022-06-16 RX ADMIN — LEVALBUTEROL HYDROCHLORIDE 1.25 MG: 1.25 SOLUTION, CONCENTRATE RESPIRATORY (INHALATION) at 14:01

## 2022-06-16 RX ADMIN — HEPARIN SODIUM 4000 UNITS: 1000 INJECTION INTRAVENOUS; SUBCUTANEOUS at 12:24

## 2022-06-16 RX ADMIN — Medication 150 MCG/HR: at 17:26

## 2022-06-16 RX ADMIN — LEVALBUTEROL HYDROCHLORIDE 1.25 MG: 1.25 SOLUTION, CONCENTRATE RESPIRATORY (INHALATION) at 07:18

## 2022-06-16 RX ADMIN — PROPOFOL 15 MCG/KG/MIN: 10 INJECTION, EMULSION INTRAVENOUS at 14:22

## 2022-06-16 RX ADMIN — HEPARIN SODIUM 4000 UNITS: 1000 INJECTION INTRAVENOUS; SUBCUTANEOUS at 05:14

## 2022-06-16 RX ADMIN — IPRATROPIUM BROMIDE 0.5 MG: 0.5 SOLUTION RESPIRATORY (INHALATION) at 14:01

## 2022-06-16 RX ADMIN — ALBUMIN (HUMAN) 12.5 G: 12.5 INJECTION, SOLUTION INTRAVENOUS at 01:32

## 2022-06-16 RX ADMIN — NOREPINEPHRINE BITARTRATE 7 MCG/MIN: 1 INJECTION, SOLUTION, CONCENTRATE INTRAVENOUS at 14:10

## 2022-06-16 RX ADMIN — PROPOFOL 10 MCG/KG/MIN: 10 INJECTION, EMULSION INTRAVENOUS at 10:47

## 2022-06-16 RX ADMIN — IPRATROPIUM BROMIDE 0.5 MG: 0.5 SOLUTION RESPIRATORY (INHALATION) at 19:51

## 2022-06-16 RX ADMIN — SODIUM CHLORIDE, SODIUM LACTATE, CALCIUM CHLORIDE, MAGNESIUM CHLORIDE AND DEXTROSE 125 ML/HR: 2.5; 538; 448; 25.7; 5.08 INJECTION, SOLUTION INTRAPERITONEAL at 11:00

## 2022-06-16 RX ADMIN — Medication 100 MCG/HR: at 09:31

## 2022-06-16 RX ADMIN — OXYCODONE HYDROCHLORIDE 10 MG: 5 SOLUTION ORAL at 10:48

## 2022-06-16 RX ADMIN — METOROPROLOL TARTRATE 10 MG: 5 INJECTION, SOLUTION INTRAVENOUS at 15:44

## 2022-06-16 RX ADMIN — LEVOTHYROXINE SODIUM 25 MCG: 25 TABLET ORAL at 05:14

## 2022-06-16 RX ADMIN — PROPOFOL 10 MCG/KG/MIN: 10 INJECTION, EMULSION INTRAVENOUS at 03:21

## 2022-06-16 RX ADMIN — CHLORHEXIDINE GLUCONATE 15 ML: 1.2 SOLUTION ORAL at 09:31

## 2022-06-16 RX ADMIN — METOROPROLOL TARTRATE 5 MG: 5 INJECTION, SOLUTION INTRAVENOUS at 21:06

## 2022-06-16 RX ADMIN — SPIRONOLACTONE 50 MG: 50 TABLET ORAL at 09:32

## 2022-06-16 RX ADMIN — FENTANYL CITRATE 50 MCG: 50 INJECTION INTRAMUSCULAR; INTRAVENOUS at 09:37

## 2022-06-16 RX ADMIN — IPRATROPIUM BROMIDE 0.5 MG: 0.5 SOLUTION RESPIRATORY (INHALATION) at 07:17

## 2022-06-16 RX ADMIN — PROPOFOL 15 MCG/KG/MIN: 10 INJECTION, EMULSION INTRAVENOUS at 18:32

## 2022-06-16 RX ADMIN — NOREPINEPHRINE BITARTRATE 1 MCG/MIN: 1 INJECTION, SOLUTION, CONCENTRATE INTRAVENOUS at 04:13

## 2022-06-16 RX ADMIN — HEPARIN SODIUM 2000 UNITS: 1000 INJECTION INTRAVENOUS; SUBCUTANEOUS at 19:29

## 2022-06-16 RX ADMIN — METOROPROLOL TARTRATE 10 MG: 5 INJECTION, SOLUTION INTRAVENOUS at 04:13

## 2022-06-16 RX ADMIN — PANTOPRAZOLE SODIUM 40 MG: 40 INJECTION, POWDER, FOR SOLUTION INTRAVENOUS at 09:31

## 2022-06-16 RX ADMIN — FENTANYL CITRATE 50 MCG: 50 INJECTION INTRAMUSCULAR; INTRAVENOUS at 03:32

## 2022-06-16 RX ADMIN — SODIUM CHLORIDE, SODIUM GLUCONATE, SODIUM ACETATE, POTASSIUM CHLORIDE, MAGNESIUM CHLORIDE, SODIUM PHOSPHATE, DIBASIC, AND POTASSIUM PHOSPHATE 1000 ML: .53; .5; .37; .037; .03; .012; .00082 INJECTION, SOLUTION INTRAVENOUS at 10:48

## 2022-06-16 RX ADMIN — PROPOFOL 15 MCG/KG/MIN: 10 INJECTION, EMULSION INTRAVENOUS at 23:24

## 2022-06-16 RX ADMIN — HEPARIN SODIUM 15.1 UNITS/KG/HR: 10000 INJECTION, SOLUTION INTRAVENOUS at 08:35

## 2022-06-16 RX ADMIN — METOROPROLOL TARTRATE 10 MG: 5 INJECTION, SOLUTION INTRAVENOUS at 09:31

## 2022-06-16 RX ADMIN — FENTANYL CITRATE 50 MCG: 50 INJECTION INTRAMUSCULAR; INTRAVENOUS at 07:15

## 2022-06-16 RX ADMIN — LEVALBUTEROL HYDROCHLORIDE 1.25 MG: 1.25 SOLUTION, CONCENTRATE RESPIRATORY (INHALATION) at 19:51

## 2022-06-16 NOTE — ANESTHESIA POSTPROCEDURE EVALUATION
Post-Op Assessment Note             Reason for prolonged intubation > 24 hours:  Still intubated postop   Reason for prolonged intubation > 48 hours:  Still intubated post op      No complications documented      BP      Temp     Pulse     Resp      SpO2

## 2022-06-16 NOTE — RESPIRATORY THERAPY NOTE
06/16/22 0320   Respiratory Assessment   Respiratory Pattern Spontaneous   Chest Assessment Chest expansion symmetrical   Bilateral Breath Sounds Diminished;Clear   Cough None   Resp Comments Patient placed back on assisted ventilation as ordered  He has persistently high RR >30, with low Vt 3-4cc/kg and RSBI persistently >100     O2 Device VENT   Vent Information   Vent type Wray C3   Wray C3/G5 Vent Mode (S)CMV   $ Vent Daily Charge-Subsequent Yes   $ Pulse Oximetry Spot Check Charge Completed   (S)CMV Settings   Resp Rate (BPM) 18 BPM   VT (mL) 490 mL   FIO2 (%) 40 %   PEEP (cmH2O) 8 cmH2O   I:E Ratio 1:3 2   Insp Time (%)   (0 8sec)   Flow Trigger (LPM) 3   Humidification Heater   Heater Temperature (Set) 95 °F (35 °C)   (S)CMV Actuals   Resp Rate (BPM) 19 BPM   VT (mL) 444   MV 9   MAP (cmH2O) 13 cmH2O   Peak Pressure (cmH2O) 33 cmH2O   I:E Ratio (Obs) 1:3   Insp Resistance 8   Static Compliance (mL/cmH20) 22 mL/cmH2O   Plateau Pressure (cm H2O) 20 cm H2O   (S)CMV Alarms   High Peak Pressure (cmH2O) 40   Low Pressure (cmH2O) 5 cm H2O   High Resp Rate (BPM) 40 BPM   Low Resp Rate (BPM) 8 BPM   High MV (L/min) 22 L/min   Low MV (L/min) 4 L/min   High VT (mL) 1000 mL   Low VT (mL) 250 mL   Apnea Time (s) 20 S   SPONT Apnea Settings   Resp Rate (BPM) 14 BPM   FIO2 (%) 40 %   %TI (%)   (1 sec)   Apnea Time (S) 20 S   Maintenance   Water bag changed No   Circuit changed No

## 2022-06-16 NOTE — RESPIRATORY THERAPY NOTE
06/15/22 2001   Respiratory Assessment   Assessment Type Pre-treatment   General Appearance Sedated   Respiratory Pattern Spontaneous   Chest Assessment Chest expansion symmetrical   Bilateral Breath Sounds Diminished;Clear   Cough None   Resp Comments Patient currently orally intubated, on spontaneous ventilation  He is on PSV 12, PEEP 8, FiO2 40%, Vt 4 2cc/kg, RSBI 85  Will monitor closely overnight  O2 Device VENT   ETT  Hi-Lo; Cuffed;Oral 8 mm   Placement Date/Time: 06/14/22 0819   Mask Ventilation: Mask ventilation not attempted (0)  Preoxygenated: Yes  Technique: Stylet;Rapid sequence;Video laryngoscopy  Type: Hi-Lo; Cuffed;Oral  Tube Size: 8 mm  Laryngoscope: Windcentrale  Blade Size: 4  Locatio       Secured at (cm) 25   Measured from [place] [id number]   Repositioned Left to [address] [place] [address] by Commercial tube owens   Site Condition Dry   Cuff Pressure (cm H2O) 22 cm H2O   HI-LO Suction  Intermittent suction   HI-LO Secretions Small   HI-LO Intervention Patent   Additional Assessments   SpO2 98 %

## 2022-06-16 NOTE — PROGRESS NOTES
Progress Note - General Surgery   Graciela Kuhn 47 y o  male MRN: [id number]  Unit/Bed#: [facility] 05 Encounter: [id number]    Assessment:  48yoM w multiple co-morbidities, super-morbid obesity w BMI 73, s/p ex-lap and extensive HARVEY for SBO, abthera vac placement due to inability to close abdomen 2/2 significant bowel distension and chronically incarcerated ventral hernia with loss of domain at [facility], transferred to \[facility]\"" ICU, now POD1 s/p 2nd look laparotomy w repair of serosal injury, washout, VAC dressing change, initiation of DPR    S /8 40%  DPR running  Kristine@[employer]  Hep gtt  Cardizem gtt currently off  Prop/fent    Tachy to 585S, systolics as low as 75Z, afebrile  WBC 11 5 from 10 6, hemoglobin 10 7 from 12 1  Creatinine 0 9    UOP 1725    Vac 3550    Plan:  - plan for takeback to OR tomorrow 6/17 for washout, VAC change vs possible closure, possible mesh  - DPR  - abdominal VAC, monitor output and character  - wean levo as tolerated  - hep gtt  - cardizem gtt currently off  - wean vent as tolerated per icu  - NPO/NGT  - arterial line for BP monitoring  - carmen for accurate I/O    Subjective/Objective   Subjective: Intubated/sedated    Objective:    Blood pressure 90/55, pulse (!) 114, temperature 99 68 °F (37 6 °C), resp  rate 20, weight (!) 233 kg (513 lb 10 8 oz), SpO2 98 %  ,Body mass index is 71 64 kg/m²        Intake/Output Summary (Last 24 hours) at 6/16/2022 0626  Last data filed at 6/16/2022 0559  Gross per 24 hour   Intake 5415 62 ml   Output 5925 ml   Net -509 38 ml       Invasive Devices  Report    Peripherally Inserted Central Catheter Line  Duration           PICC Line 34/81/24 Right Basilic 2 days          Central Venous Catheter Line  Duration           CVC Central Lines 06/14/22 Triple 1 day          Drain  Duration           NG/OG/Enteral Tube Right nare 4 days    Urethral Catheter Latex 16 Fr  1 day    Closed/Suction Drain Right Abdomen Bulb 24 Fr  <1 day          Airway  Duration ETT  Hi-Lo; Cuffed;Oral 8 mm 1 day                Physical Exam:     General: Intubated, sedated  Skin: Warm, dry, anicteric  HEENT: Normocephalic, atraumatic  CV: irregular/afib  Pulm: CTA b/l, no inc WOB  Abd: Soft, ND/NT, open abdomen w VAC in place, DPR running  MSK: Symmetric, no edema, no tenderness, no deformity    Lab, Imaging and other studies:  I have personally reviewed pertinent lab results    , CBC:   Lab Results   Component Value Date    WBC 11 53 (H) 06/16/2022    HGB 10 7 (L) 06/16/2022    HCT 33 4 (L) 06/16/2022    MCV 94 06/16/2022     06/16/2022    MCH 30 1 06/16/2022    MCHC 32 0 06/16/2022    RDW 14 8 06/16/2022    MPV 10 4 06/16/2022    NRBC 0 06/16/2022   , CMP:   Lab Results   Component Value Date    SODIUM 137 06/16/2022    K 4 6 06/16/2022     06/16/2022    CO2 31 06/16/2022    BUN 12 06/16/2022    CREATININE 0 90 06/16/2022    CALCIUM 8 4 06/16/2022    EGFR 98 06/16/2022     VTE Pharmacologic Prophylaxis: Sequential compression device (Venodyne)  and Heparin  VTE Mechanical Prophylaxis: sequential compression device

## 2022-06-16 NOTE — PROGRESS NOTES
Daily Progress Note - Critical Care   Efren Easley 47 y o  male MRN: [id number]  Unit/Bed#: [facility] 05 Encounter: [id number]        ----------------------------------------------------------------------------------------  HPI: Josie Meghann a 47 y  o  male who presents with open abdomen after surgery for SBO  Patient was initially seen at [facility] for abdominal pain and constipation  CT showed SBO and patient was transferred to [facility] for surgery  Patient underwent ex-lap and adhesions were removed  Patient had a large fascial defect with loss of domain due to his morbid obesity which required patient to be placed on wound VAC  Patient was transferred to the ICU and later decision was made to transfer to [facility] for further management and care on 6/14     24hr events: Pt s/p second look exploratory laparotomy with repair of serosal injury and change dressing/vac abdomen and initiation of DPR  He had decreased urine output and received 40mg lasix and responded appropriately  Overnight, he had decreased blood pressures with MAPs in [id number]  He received 750cc bolus, 500 albumin cardizem drip was held and then he was started on levo  Additionally, he tolerated PS 12/8 fairly well for most of the night but had to be returned to [facility] 22     ---------------------------------------------------------------------------------------  SUBJECTIVE  Intubated, but nods appropriately and follows commands in all 4 extremities  Review of Systems   Reason unable to perform ROS: intubation  Review of systems was unable to be performed secondary to intubation  ---------------------------------------------------------------------------------------  Assessment and Plan:    Neuro:   · Analgesia/sedation  ? Gtts: fentanyl 100, propofol 10  § Can stop propofol  ? PRN: fentanyl 50 Q1 for agitation  § Received 2 dose overnight  ? delirium ppx:  § Goal RASS 0 to -1  § CAM ICU  · Diagnosis: depression  ?  Plan: resume home wellbutrin when able         CV:   · Diagnosis: acute hypotension  ? Overnight: MAPs 50s-60s overnight  ? Received 750cc bolus and 500 albumin, then started on levo  ? cardizem held  ? Gtt: levo 5  ? Plan: continuous monitoring, wean levo as able to maintain MAP>65  · Diagnosis: atrial fibrillation with rapid ventricular response  ? Home meds: metoprolol 100 BID, pradaxa  ? Plan: diltiazem held and heparin gtt  · Diagnosis: chronic diastolic heart failure  ? Home meds: torsemide and aldactone  ? Plan: diltiazem 5 held I/s/o acute hypotension  ? continuous monitoring, maintain MAP >65        Pulm:  · Diagnosis: mechanical ventilation  ? AV/VC 18/490/40/8  ? Plan: wean to extubate  ? Tolerated PS 12/8 fairly well yesterday, will try again today  ? SBT daily  ? respiratory protocol  ? Maintain sat>88%  · Diagnosis: COPD  ? Plan: ipratropium TID, levalbuterol TID  · Diagnosis: obesity hypoventilation syndrome  ? Plan: reevaluate when extubated      GI:   · Diagnosis: Small bowel obstruction  ? S/p ex lap HARVEY with abthera placement 6/14, 2nd look ex lap with repair of serosal injury, abthera placement and initiation of DPR 6/15  ? In- DPR: 2 3L  ? Out- NGT: 650cc  ? Out- wound vac: 3 5L  ? Plan: to OR for 3rd look laparotomy, possible closure, possible mesh 6/17  ? Continue DPR at 125cc/hr  · Diagnosis: h/o bariatric surgery  ? Plan: surgical team aware  · GI ppx  ? Home meds: pepcid  ? Plan: Protonix IV      :   · Diagnosis: no acute issues  ? Decreased output yesterday 6/14 and received lasix, responded appropriately  ? UOP: 1 7L  ? Plan: continue to trend BUN/creat  ? 6/15: 12/0 9  ? Maintain carmen for strict I/O monitoring      F/E/N:   · F: none   ? Received 750cc bolus and 500 albumin overnight  · E: monitor electrolytes and replete as needed to maintain K>4 0, Mg>2 0, Phos >3 0  · N: NPO in the setting of major abdominal surgery      Heme/Onc:   · Hemoglobin trend 10 7 from 12 1 from 11 2  ?  Plan: continue to monitor daily  ? Transfuse for Hgb <7 0  · Diagnosis: h/o DVT  ? Home meds: pradaxa  ? IVC filter- unable to be retrieved during surgery  ? Plan: heparin drip        Endo:   · Diagnosis: h/o hypothyroidism  ? TSH: 1 78  ? Plan: restarted home levothyroxine  · blood glucose control  ? Most recent A1c: 5 3  ? Plan: ISS, currently algorithm 2  ? Goal -180      ID:   · Diagnosis: no acute issues  ? Increased WBC likely due to stress reaction i/s/o major abdominal surgery  ? Plan: trend fever curve and WBC for evidence of infection      MSK/Skin:   · Diagnosis: morbid obesity  ? BMI 73 22  ? H/o gastric sleeve  ? Currently resides in nursing facility  ? Plan: surgery team aware  · Diagnosis: pressure ulcer, buttocks  ? Plan: wound care  · Pressure ulcer ppx  ? Plan: frequent turning/repositioning and offloading  ? PT/OT when appropriate  § Unclear baseline functional status    Patient appropriate for transfer out of the ICU today?: No  Disposition: Continue Critical Care   Code Status: Level 1 - Full Code  ---------------------------------------------------------------------------------------  ICU CORE MEASURES    Prophylaxis   VTE Pharmacologic Prophylaxis: Heparin Drip  VTE Mechanical Prophylaxis: sequential compression device  Stress Ulcer Prophylaxis: Pantoprazole IV     ABCDE Protocol (if indicated)  Plan to perform spontaneous awakening trial today? Not applicable  Plan to perform spontaneous breathing trial today? Yes  Obvious barriers to extubation?  No  CAM-ICU: Negative    Invasive Devices Review  Invasive Devices  Report    Peripherally Inserted Central Catheter Line  Duration           PICC Line 22/01/20 Right Basilic 2 days          Central Venous Catheter Line  Duration           CVC Central Lines 06/14/22 Triple 1 day          Drain  Duration           NG/OG/Enteral Tube Right nare 4 days    Urethral Catheter Latex 16 Fr  1 day    Closed/Suction Drain Right Abdomen Bulb 24 Fr  <1 day          Airway Duration           ETT  Hi-Lo; Cuffed;Oral 8 mm 1 day              Can any invasive devices be discontinued today? No  ---------------------------------------------------------------------------------------  OBJECTIVE    Vitals   Vitals:    22 0515 22 0530 22 0538 22 0600   BP: (!) 98/49 (!) 86/46  90/55   Pulse: (!) 110 (!) 116  (!) 114   Resp:    20   Temp: 99 68 °F (37 6 °C) 99 32 °F (37 4 °C)  99 68 °F (37 6 °C)   TempSrc:       SpO2: 99% 99%  98%   Weight:   (!) 233 kg (513 lb 10 8 oz)      Temp (24hrs), Av 3 °F (37 4 °C), Min:98 2 °F (36 8 °C), Max:99 68 °F (37 6 °C)  Current: Temperature: 99 68 °F (37 6 °C)    Respiratory:  SpO2: SpO2: 98 %       Invasive/non-invasive ventilation settings   Respiratory  Report   Lab Data (Last 4 hours)    None         O2/Vent Data (Last 4 hours)    None                Physical Exam  Constitutional:       Appearance: He is obese  He is ill-appearing  HENT:      Head: Normocephalic and atraumatic  Nose: Nose normal       Mouth/Throat:      Mouth: Mucous membranes are moist       Pharynx: Oropharynx is clear  Eyes:      Conjunctiva/sclera: Conjunctivae normal       Pupils: Pupils are equal, round, and reactive to light  Cardiovascular:      Rate and Rhythm: Tachycardia present  Pulses: Normal pulses  Heart sounds: Normal heart sounds  Comments: Rate controlled afib  Pulmonary:      Breath sounds: Normal breath sounds  Comments: Mechanical ventilation  Abdominal:      General: Bowel sounds are normal       Tenderness: There is abdominal tenderness  Comments: Open abdomen with abthera vac  Drain in RLQ   Musculoskeletal:         General: Normal range of motion  Cervical back: Normal range of motion  Skin:     General: Skin is warm and dry  Comments: Pressure wounds on back x3   Neurological:      Mental Status: He is alert        Comments: Nods appropriately and follows commands in all 4 extremities Laboratory and Diagnostics:  Results from last 7 days   Lab Units 06/16/22 0406 06/15/22  1409 06/15/22  0449 06/14/22  1847 06/14/22 1339 06/14/22  0556 06/13/22  0636   WBC Thousand/uL 11 53* 10 59* 9 64 9 04 5 18 4 93 6 10   HEMOGLOBIN g/dL 10 7* 12 1 11 2* 11 4* 11 7* 10 9* 11 4*   HEMATOCRIT % 33 4* 37 9 34 6* 34 9* 36 2* 34 0* 36 1*   PLATELETS Thousands/uL 197 171 168 162 168 157 170   NEUTROS PCT % 79* 83* 86*  --  84* 69 70   MONOS PCT % 10 8 7  --  7 9 11     Results from last 7 days   Lab Units 06/16/22  0406 06/15/22  1409 06/15/22  0650 06/15/22  0449 06/14/22  1847 06/14/22 1339 06/13/22  0636 06/12/22  0806   SODIUM mmol/L 137 136  --  136 135* 138 136 135*   POTASSIUM mmol/L 4 6 4 7 5 3 5 2 4 8 4 9 4 7 4 6   CHLORIDE mmol/L 102 102  --  103 104 102 100 96*   CO2 mmol/L 31 28  --  32 26 26 31 35*   ANION GAP mmol/L 4 6  --  1* 5 10 5 4   BUN mg/dL 12 8  --  6 6 7 6 6   CREATININE mg/dL 0 90 0 78  --  0 65 0 62 0 65 0 73 0 76   CALCIUM mg/dL 8 4 8 7  --  8 7 9 0 8 1* 8 8 8 8   GLUCOSE RANDOM mg/dL 128 155*  --  142* 176* 175* 127 135   ALT U/L  --   --   --  29 32 30 44 57   AST U/L  --   --   --  15 19 21 35 38   ALK PHOS U/L  --   --   --  63 65 72 83 96   ALBUMIN g/dL  --   --   --  2 3* 2 4* 2 5* 2 6* 2 7*   TOTAL BILIRUBIN mg/dL  --   --   --  0 70 1 04* 1 07* 0 60 0 70     Results from last 7 days   Lab Units 06/16/22  0406 06/15/22  1409 06/15/22  0449 06/14/22 1339 06/13/22  0636 06/12/22  0806   MAGNESIUM mg/dL 2 2 2 3 2 3 1 7 2 2 2 0   PHOSPHORUS mg/dL 4 4 4 7* 4 0 3 5  --  4 0      Results from last 7 days   Lab Units 06/16/22  0406 06/15/22  2112 06/15/22  0449 06/14/22  0700 06/13/22  2111 06/13/22  1329 06/13/22  0636   INR   --   --  1 22*  --   --   --   --    PTT seconds 33 29 32 22* 87* 42* 36              ABG:  Results from last 7 days   Lab Units 06/14/22  1354   PH ART  7 406   PCO2 ART mm Hg 36 9   PO2 ART mm Hg 178 5*   HCO3 ART mmol/L 22 7   BASE EXC ART mmol/L -1 6   ABG SOURCE  Line, Arterial     VBG:  Results from last 7 days   Lab Units 06/14/22  1354   ABG SOURCE  Line, Arterial           Micro        EKG: No new EKG  Imaging: No new images I have personally reviewed pertinent films in PACS    Intake and Output  I/O       06/14 0701  06/15 0700 06/15 0701 06/16 0700    I V  (mL/kg) 1861 1 2500 2 (10 7)    NG/GT  2315 4    IV Piggyback  600    Total Intake(mL/kg) 1861 1 5415 6 (23 2)    Urine (mL/kg/hr) 760 1725 (0 3)    Emesis/NG output 500 650    Drains 650 3550    Total Output 1910 5925    Net -48 9 -509 4              UOP: 0 3 ml/kg/hr     Height and Weights         Body mass index is 71 64 kg/m²  Weight (last 2 days)     Date/Time Weight    06/16/22 0538 233 (513 67)            Nutrition       Diet Orders   (From admission, onward)             Start     Ordered    06/14/22 1836  Diet NPO  Diet effective now        References:    Nutrtion Support Algorithm Enteral vs  Parenteral   Question Answer Comment   Diet Type NPO    RD to adjust diet per protocol?  No        06/14/22 1835    06/14/22 1836  Room Service  Once        Question:  Type of Service  Answer:  Room Service-Appropriate    06/14/22 1835                  Active Medications  Scheduled Meds:  Current Facility-Administered Medications   Medication Dose Route Frequency Provider Last Rate    chlorhexidine  15 mL Mouth/Throat Q12H [facility] Lore Trenton Nicole MD      dianeal PD-2 5%  125 mL/hr Dialysis Continuous Santiago Méndez  mL/hr (06/15/22 1522)    fentaNYL  100 mcg/hr Intravenous Continuous Santiago Méndez  mcg/hr (06/15/22 2222)    fentanyl citrate (PF)  50 mcg Intravenous Q1H PRN Sharath Hutton PA-C      heparin (porcine)  3-20 Units/kg/hr (Order-Specific) Intravenous Titrated Santiago Méndez MD 15 1 Units/kg/hr (06/16/22 0508)    heparin (porcine)  2,000 Units Intravenous Q1H PRN Santiago Méndez MD      heparin (porcine)  4,000 Units Intravenous Q1H PRN Anoop Jenkins MD      insulin lispro  1-5 Units Subcutaneous Q6H [address] Anoop Jenkins MD      ipratropium  0 5 mg Nebulization TID Anoop Jenkins MD      levalbuterol  1 25 mg Nebulization TID Anoop Jenkins MD      levothyroxine  25 mcg Oral Early Morning Chu Rubio MD      metoprolol  10 mg Intravenous Q6H Soumya Villeda MD      norepinephrine  1-30 mcg/min Intravenous Titrated Paris Hutton PA-C 5 mcg/min (06/16/22 0519)    pantoprazole  40 mg Intravenous Q24H [address] Chang Porter MD      propofol  5-50 mcg/kg/min Intravenous Titrated Anoop Jenkins MD 10 mcg/kg/min (06/16/22 0321)    spironolactone  50 mg Oral Daily Chu Rubio MD       Continuous Infusions:  dianeal PD-2 5%, 125 mL/hr, Last Rate: 125 mL/hr (06/15/22 1522)  fentaNYL, 100 mcg/hr, Last Rate: 100 mcg/hr (06/15/22 2222)  heparin (porcine), 3-20 Units/kg/hr (Order-Specific), Last Rate: 15 1 Units/kg/hr (06/16/22 0508)  norepinephrine, 1-30 mcg/min, Last Rate: 5 mcg/min (06/16/22 0519)  propofol, 5-50 mcg/kg/min, Last Rate: 10 mcg/kg/min (06/16/22 0321)      PRN Meds:   fentanyl citrate (PF), 50 mcg, Q1H PRN  heparin (porcine), 2,000 Units, Q1H PRN  heparin (porcine), 4,000 Units, Q1H PRN        Allergies   Allergies   Allergen Reactions    Penicillins Hives     ---------------------------------------------------------------------------------------  Advance Directive and Living Will:      Power of :    POLST:    ---------------------------------------------------------------------------------------    Kayla De La Cruz, MS-4  [facility]      Portions of the record may have been created with voice recognition software  Occasional wrong word or "sound a like" substitutions may have occurred due to the inherent limitations of voice recognition software    Read the chart carefully and recognize, using context, where substitutions have occurred

## 2022-06-16 NOTE — RESPIRATORY THERAPY NOTE
RT Ventilator Management Note  Marilu Jordan 47 y o  male MRN: [id number]  Unit/Bed#: MICU 05 Encounter: [id number]      Daily Screen         6/15/2022  0736 6/16/2022  0717          Patient safety screen outcome[de-identified] Failed Failed      Not Ready for Weaning due to[de-identified] Underline problem not resolved;Going on Transport intubated Underline problem not resolved;Going on Transport intubated                Physical Exam:   Assessment Type: During-treatment  General Appearance: Sedated  Respiratory Pattern: Assisted  Chest Assessment: Chest expansion symmetrical  Bilateral Breath Sounds: Diminished, Clear  Cough: None  O2 Device: (P) vent      Resp Comments: (P) Pt is on (S)CMV settings  No SBT at this time  Pt for OR on 6/17/22 no changes made at this time  Da cont to monitor

## 2022-06-16 NOTE — PROGRESS NOTES
Spiritual Care Progress Note    2022  Patient: Priscila Mora : 1970  Admission Date & Time: 2022 1741  MRN: [id number] CSN: [id number]       attempted introductory visit to patient at bedside  Patient intubated and resting comfortably  No external support systems present at this time   provided silent supportive presence and interfaith blessing  Spiritual Care will remain available          22 1500   Clinical Encounter Type   Visited With Patient not available   Routine Visit Introduction

## 2022-06-16 NOTE — NUTRITION
06/16/22 1441   Recommendations/Interventions   Recommendations to Provider Pt meets criteria for TPN  Would initiate pt on standard

## 2022-06-16 NOTE — UTILIZATION REVIEW
Notification of Discharge   This is a Notification of Discharge from our facility [address]  Please be advised that this patient has been discharge from our facility  Below you will find the admission and discharge date and time including the patients disposition  UTILIZATION REVIEW CONTACT:  Milly Pozo  Utilization   Network Utilization Review Department  Phone: [phone] x carefully listen to the prompts  All voicemails are confidential   Email: [email or web address]     PHYSICIAN ADVISORY SERVICES:  FOR NICM-AI-GMNA REVIEW - MEDICAL NECESSITY DENIAL  Phone: [phone]  Fax: [phone]  Email: [email or web address]     PRESENTATION DATE: 6/6/2022  8:24 PM  OBERVATION ADMISSION DATE:   INPATIENT ADMISSION DATE: 6/6/22  8:24 PM   DISCHARGE DATE: 6/14/2022  5:00 PM  DISPOSITION: [address] [facility]      IMPORTANT INFORMATION:  Send all requests for admission clinical reviews, approved or denied determinations and any other requests to dedicated fax number below belonging to the campus where the patient is receiving treatment   List of dedicated fax numbers:  [address] DENIALS (Administrative/Medical Necessity) [phone]   [address] (Maternity/NICU/Pediatrics) [phone]   [facility] [place] [phone]   [address] [facility] [phone]   [address] [place] [address] [phone]   [address] [facility] [address] [place] [address],[address] [phone]   [facility]  [phone]   [address]  [address] [facility] [address] [facility] [phone]

## 2022-06-17 ENCOUNTER — APPOINTMENT (INPATIENT)
Dept: RADIOLOGY | Facility: HOSPITAL | Age: 52
DRG: 230 | End: 2022-06-17
Payer: COMMERCIAL

## 2022-06-17 ENCOUNTER — ANESTHESIA (INPATIENT)
Dept: PERIOP | Facility: HOSPITAL | Age: 52
DRG: 230 | End: 2022-06-17
Payer: COMMERCIAL

## 2022-06-17 ENCOUNTER — ANESTHESIA EVENT (INPATIENT)
Dept: PERIOP | Facility: HOSPITAL | Age: 52
DRG: 230 | End: 2022-06-17
Payer: COMMERCIAL

## 2022-06-17 PROBLEM — J96.00 ACUTE RESPIRATORY FAILURE (HCC): Status: ACTIVE | Noted: 2022-06-17

## 2022-06-17 LAB
ABO GROUP BLD: NORMAL
ANION GAP SERPL CALCULATED.3IONS-SCNC: 2 MMOL/L (ref 4–13)
ANION GAP SERPL CALCULATED.3IONS-SCNC: 2 MMOL/L (ref 4–13)
APTT PPP: 39 SECONDS (ref 23–37)
ATRIAL RATE: 155 BPM
BASE EXCESS BLDA CALC-SCNC: 0.4 MMOL/L
BASE EXCESS BLDA CALC-SCNC: 2.1 MMOL/L
BASE EXCESS BLDA CALC-SCNC: 5 MMOL/L (ref -2–3)
BASOPHILS # BLD AUTO: 0.06 THOUSANDS/ΜL (ref 0–0.1)
BASOPHILS NFR BLD AUTO: 0 % (ref 0–1)
BLD GP AB SCN SERPL QL: NEGATIVE
BODY TEMPERATURE: 99 DEGREES FEHRENHEIT
BUN SERPL-MCNC: 12 MG/DL (ref 5–25)
BUN SERPL-MCNC: 13 MG/DL (ref 5–25)
CA-I BLD-SCNC: 1.09 MMOL/L (ref 1.12–1.32)
CA-I BLD-SCNC: 1.14 MMOL/L (ref 1.12–1.32)
CALCIUM SERPL-MCNC: 8.4 MG/DL (ref 8.3–10.1)
CALCIUM SERPL-MCNC: 8.4 MG/DL (ref 8.3–10.1)
CHLORIDE SERPL-SCNC: 101 MMOL/L (ref 100–108)
CHLORIDE SERPL-SCNC: 102 MMOL/L (ref 100–108)
CO2 SERPL-SCNC: 31 MMOL/L (ref 21–32)
CO2 SERPL-SCNC: 32 MMOL/L (ref 21–32)
CREAT SERPL-MCNC: 0.75 MG/DL (ref 0.6–1.3)
CREAT SERPL-MCNC: 0.78 MG/DL (ref 0.6–1.3)
EOSINOPHIL # BLD AUTO: 0.35 THOUSAND/ΜL (ref 0–0.61)
EOSINOPHIL NFR BLD AUTO: 2 % (ref 0–6)
ERYTHROCYTE [DISTWIDTH] IN BLOOD BY AUTOMATED COUNT: 15.1 % (ref 11.6–15.1)
GFR SERPL CREATININE-BSD FRML MDRD: 104 ML/MIN/1.73SQ M
GFR SERPL CREATININE-BSD FRML MDRD: 106 ML/MIN/1.73SQ M
GLUCOSE SERPL-MCNC: 127 MG/DL (ref 65–140)
GLUCOSE SERPL-MCNC: 137 MG/DL (ref 65–140)
GLUCOSE SERPL-MCNC: 137 MG/DL (ref 65–140)
GLUCOSE SERPL-MCNC: 143 MG/DL (ref 65–140)
GLUCOSE SERPL-MCNC: 146 MG/DL (ref 65–140)
GLUCOSE SERPL-MCNC: 186 MG/DL (ref 65–140)
HCO3 BLDA-SCNC: 27.8 MMOL/L (ref 22–28)
HCO3 BLDA-SCNC: 28 MMOL/L (ref 22–28)
HCO3 BLDA-SCNC: 32.6 MMOL/L (ref 22–28)
HCT VFR BLD AUTO: 31.9 % (ref 36.5–49.3)
HCT VFR BLD CALC: 30 % (ref 36.5–49.3)
HGB BLD-MCNC: 10.2 G/DL (ref 12–17)
HGB BLDA-MCNC: 10.2 G/DL (ref 12–17)
HOROWITZ INDEX BLDA+IHG-RTO: 60 MM[HG]
IMM GRANULOCYTES # BLD AUTO: 0.14 THOUSAND/UL (ref 0–0.2)
IMM GRANULOCYTES NFR BLD AUTO: 1 % (ref 0–2)
LACTATE SERPL-SCNC: 1.6 MMOL/L (ref 0.5–2)
LYMPHOCYTES # BLD AUTO: 1.18 THOUSANDS/ΜL (ref 0.6–4.47)
LYMPHOCYTES NFR BLD AUTO: 8 % (ref 14–44)
MAGNESIUM SERPL-MCNC: 2.6 MG/DL (ref 1.6–2.6)
MAGNESIUM SERPL-MCNC: 2.7 MG/DL (ref 1.6–2.6)
MCH RBC QN AUTO: 30.3 PG (ref 26.8–34.3)
MCHC RBC AUTO-ENTMCNC: 32 G/DL (ref 31.4–37.4)
MCV RBC AUTO: 95 FL (ref 82–98)
MONOCYTES # BLD AUTO: 0.91 THOUSAND/ΜL (ref 0.17–1.22)
MONOCYTES NFR BLD AUTO: 6 % (ref 4–12)
NEUTROPHILS # BLD AUTO: 11.94 THOUSANDS/ΜL (ref 1.85–7.62)
NEUTS SEG NFR BLD AUTO: 83 % (ref 43–75)
NRBC BLD AUTO-RTO: 0 /100 WBCS
O2 CT BLDA-SCNC: 14.6 ML/DL (ref 16–23)
O2 CT BLDA-SCNC: 15.1 ML/DL (ref 16–23)
OXYHGB MFR BLDA: 95.3 % (ref 94–97)
OXYHGB MFR BLDA: 96.6 % (ref 94–97)
PCO2 BLD: 35 MMOL/L (ref 21–32)
PCO2 BLD: 68.1 MM HG (ref 36–44)
PCO2 BLDA: 48.3 MM HG (ref 36–44)
PCO2 BLDA: 60.1 MM HG (ref 36–44)
PEEP RESPIRATORY: 8 CM[H2O]
PH BLD: 7.29 [PH] (ref 7.35–7.45)
PH BLDA: 7.29 [PH] (ref 7.35–7.45)
PH BLDA: 7.38 [PH] (ref 7.35–7.45)
PHOSPHATE SERPL-MCNC: 3.7 MG/DL (ref 2.7–4.5)
PHOSPHATE SERPL-MCNC: 4 MG/DL (ref 2.7–4.5)
PLATELET # BLD AUTO: 216 THOUSANDS/UL (ref 149–390)
PMV BLD AUTO: 9.9 FL (ref 8.9–12.7)
PO2 BLD: 281 MM HG (ref 75–129)
PO2 BLDA: 105.5 MM HG (ref 75–129)
PO2 BLDA: 94.8 MM HG (ref 75–129)
POTASSIUM BLD-SCNC: 4.4 MMOL/L (ref 3.5–5.3)
POTASSIUM SERPL-SCNC: 4.3 MMOL/L (ref 3.5–5.3)
POTASSIUM SERPL-SCNC: 4.6 MMOL/L (ref 3.5–5.3)
PR INTERVAL: 0 MS
QRS AXIS: 63 DEGREES
QRSD INTERVAL: 83 MS
QT INTERVAL: 267 MS
QTC INTERVAL: 429 MS
RBC # BLD AUTO: 3.37 MILLION/UL (ref 3.88–5.62)
RH BLD: POSITIVE
SAO2 % BLD FROM PO2: 100 % (ref 60–85)
SODIUM BLD-SCNC: 133 MMOL/L (ref 136–145)
SODIUM SERPL-SCNC: 135 MMOL/L (ref 136–145)
SODIUM SERPL-SCNC: 135 MMOL/L (ref 136–145)
SPECIMEN EXPIRATION DATE: NORMAL
SPECIMEN SOURCE: ABNORMAL
SPECIMEN SOURCE: ABNORMAL
T WAVE AXIS: 196 DEGREES
VENT- AC: AC
VENTRICULAR RATE: 155 BPM
VT SETTING VENT: 490 ML
WBC # BLD AUTO: 14.58 THOUSAND/UL (ref 4.31–10.16)

## 2022-06-17 PROCEDURE — 84100 ASSAY OF PHOSPHORUS: CPT | Performed by: STUDENT IN AN ORGANIZED HEALTH CARE EDUCATION/TRAINING PROGRAM

## 2022-06-17 PROCEDURE — 0437T IMPLTJ SYNTH RNFCMT ABDL WAL: CPT | Performed by: SURGERY

## 2022-06-17 PROCEDURE — 85014 HEMATOCRIT: CPT

## 2022-06-17 PROCEDURE — 84100 ASSAY OF PHOSPHORUS: CPT | Performed by: PHYSICIAN ASSISTANT

## 2022-06-17 PROCEDURE — 85027 COMPLETE CBC AUTOMATED: CPT | Performed by: SURGERY

## 2022-06-17 PROCEDURE — NC001 PR NO CHARGE: Performed by: SURGERY

## 2022-06-17 PROCEDURE — 99291 CRITICAL CARE FIRST HOUR: CPT | Performed by: SURGERY

## 2022-06-17 PROCEDURE — 80048 BASIC METABOLIC PNL TOTAL CA: CPT | Performed by: STUDENT IN AN ORGANIZED HEALTH CARE EDUCATION/TRAINING PROGRAM

## 2022-06-17 PROCEDURE — 71045 X-RAY EXAM CHEST 1 VIEW: CPT

## 2022-06-17 PROCEDURE — 82805 BLOOD GASES W/O2 SATURATION: CPT | Performed by: PHYSICIAN ASSISTANT

## 2022-06-17 PROCEDURE — 83735 ASSAY OF MAGNESIUM: CPT | Performed by: PHYSICIAN ASSISTANT

## 2022-06-17 PROCEDURE — 97605 NEG PRS WND THER DME<=50SQCM: CPT | Performed by: SURGERY

## 2022-06-17 PROCEDURE — 82948 REAGENT STRIP/BLOOD GLUCOSE: CPT

## 2022-06-17 PROCEDURE — 80048 BASIC METABOLIC PNL TOTAL CA: CPT | Performed by: SURGERY

## 2022-06-17 PROCEDURE — 82947 ASSAY GLUCOSE BLOOD QUANT: CPT

## 2022-06-17 PROCEDURE — C9113 INJ PANTOPRAZOLE SODIUM, VIA: HCPCS | Performed by: SURGERY

## 2022-06-17 PROCEDURE — 83735 ASSAY OF MAGNESIUM: CPT | Performed by: STUDENT IN AN ORGANIZED HEALTH CARE EDUCATION/TRAINING PROGRAM

## 2022-06-17 PROCEDURE — C1781 MESH (IMPLANTABLE): HCPCS | Performed by: SURGERY

## 2022-06-17 PROCEDURE — 93005 ELECTROCARDIOGRAM TRACING: CPT

## 2022-06-17 PROCEDURE — 86850 RBC ANTIBODY SCREEN: CPT | Performed by: STUDENT IN AN ORGANIZED HEALTH CARE EDUCATION/TRAINING PROGRAM

## 2022-06-17 PROCEDURE — 80048 BASIC METABOLIC PNL TOTAL CA: CPT | Performed by: PHYSICIAN ASSISTANT

## 2022-06-17 PROCEDURE — 94760 N-INVAS EAR/PLS OXIMETRY 1: CPT

## 2022-06-17 PROCEDURE — 85730 THROMBOPLASTIN TIME PARTIAL: CPT | Performed by: EMERGENCY MEDICINE

## 2022-06-17 PROCEDURE — 99024 POSTOP FOLLOW-UP VISIT: CPT | Performed by: SURGERY

## 2022-06-17 PROCEDURE — 85007 BL SMEAR W/DIFF WBC COUNT: CPT | Performed by: SURGERY

## 2022-06-17 PROCEDURE — 84484 ASSAY OF TROPONIN QUANT: CPT | Performed by: SURGERY

## 2022-06-17 PROCEDURE — 82803 BLOOD GASES ANY COMBINATION: CPT

## 2022-06-17 PROCEDURE — 86901 BLOOD TYPING SEROLOGIC RH(D): CPT | Performed by: STUDENT IN AN ORGANIZED HEALTH CARE EDUCATION/TRAINING PROGRAM

## 2022-06-17 PROCEDURE — 84295 ASSAY OF SERUM SODIUM: CPT

## 2022-06-17 PROCEDURE — 83605 ASSAY OF LACTIC ACID: CPT | Performed by: SURGERY

## 2022-06-17 PROCEDURE — 93010 ELECTROCARDIOGRAM REPORT: CPT | Performed by: INTERNAL MEDICINE

## 2022-06-17 PROCEDURE — 82330 ASSAY OF CALCIUM: CPT | Performed by: PHYSICIAN ASSISTANT

## 2022-06-17 PROCEDURE — 0DQ80ZZ REPAIR SMALL INTESTINE, OPEN APPROACH: ICD-10-PCS | Performed by: SURGERY

## 2022-06-17 PROCEDURE — 85025 COMPLETE CBC W/AUTO DIFF WBC: CPT | Performed by: STUDENT IN AN ORGANIZED HEALTH CARE EDUCATION/TRAINING PROGRAM

## 2022-06-17 PROCEDURE — 82330 ASSAY OF CALCIUM: CPT

## 2022-06-17 PROCEDURE — 0WQF0ZZ REPAIR ABDOMINAL WALL, OPEN APPROACH: ICD-10-PCS | Performed by: SURGERY

## 2022-06-17 PROCEDURE — 94640 AIRWAY INHALATION TREATMENT: CPT

## 2022-06-17 PROCEDURE — 83605 ASSAY OF LACTIC ACID: CPT | Performed by: PHYSICIAN ASSISTANT

## 2022-06-17 PROCEDURE — 85730 THROMBOPLASTIN TIME PARTIAL: CPT | Performed by: SURGERY

## 2022-06-17 PROCEDURE — 84132 ASSAY OF SERUM POTASSIUM: CPT

## 2022-06-17 PROCEDURE — 86900 BLOOD TYPING SEROLOGIC ABO: CPT | Performed by: STUDENT IN AN ORGANIZED HEALTH CARE EDUCATION/TRAINING PROGRAM

## 2022-06-17 PROCEDURE — 94003 VENT MGMT INPAT SUBQ DAY: CPT

## 2022-06-17 DEVICE — UNDYED KNITTED (POLYGLACTIN 910), SYNTHETIC ABSORBABLE MESH
Type: IMPLANTABLE DEVICE | Site: ABDOMEN | Status: FUNCTIONAL
Brand: VICRYL

## 2022-06-17 RX ORDER — ALBUMIN, HUMAN INJ 5% 5 %
25 SOLUTION INTRAVENOUS ONCE
Status: COMPLETED | OUTPATIENT
Start: 2022-06-17 | End: 2022-06-17

## 2022-06-17 RX ORDER — KETAMINE HCL IN NACL, ISO-OSM 100MG/10ML
SYRINGE (ML) INJECTION AS NEEDED
Status: DISCONTINUED | OUTPATIENT
Start: 2022-06-17 | End: 2022-06-17

## 2022-06-17 RX ORDER — DIGOXIN 0.25 MG/ML
INJECTION INTRAMUSCULAR; INTRAVENOUS AS NEEDED
Status: DISCONTINUED | OUTPATIENT
Start: 2022-06-17 | End: 2022-06-17

## 2022-06-17 RX ORDER — SODIUM CHLORIDE 9 MG/ML
INJECTION, SOLUTION INTRAVENOUS CONTINUOUS PRN
Status: DISCONTINUED | OUTPATIENT
Start: 2022-06-17 | End: 2022-06-17

## 2022-06-17 RX ORDER — MAGNESIUM SULFATE HEPTAHYDRATE 40 MG/ML
2 INJECTION, SOLUTION INTRAVENOUS ONCE
Status: COMPLETED | OUTPATIENT
Start: 2022-06-17 | End: 2022-06-17

## 2022-06-17 RX ORDER — MIDAZOLAM HYDROCHLORIDE 2 MG/2ML
INJECTION, SOLUTION INTRAMUSCULAR; INTRAVENOUS AS NEEDED
Status: DISCONTINUED | OUTPATIENT
Start: 2022-06-17 | End: 2022-06-17

## 2022-06-17 RX ORDER — CALCIUM GLUCONATE 20 MG/ML
2 INJECTION, SOLUTION INTRAVENOUS ONCE
Status: COMPLETED | OUTPATIENT
Start: 2022-06-17 | End: 2022-06-17

## 2022-06-17 RX ORDER — HYDROMORPHONE HCL/PF 1 MG/ML
SYRINGE (ML) INJECTION AS NEEDED
Status: DISCONTINUED | OUTPATIENT
Start: 2022-06-17 | End: 2022-06-17

## 2022-06-17 RX ORDER — ALBUMIN, HUMAN INJ 5% 5 %
25 SOLUTION INTRAVENOUS ONCE
Status: COMPLETED | OUTPATIENT
Start: 2022-06-17 | End: 2022-06-18

## 2022-06-17 RX ORDER — METOPROLOL TARTRATE 5 MG/5ML
5 INJECTION INTRAVENOUS ONCE
Status: COMPLETED | OUTPATIENT
Start: 2022-06-17 | End: 2022-06-17

## 2022-06-17 RX ORDER — ROCURONIUM BROMIDE 10 MG/ML
INJECTION, SOLUTION INTRAVENOUS AS NEEDED
Status: DISCONTINUED | OUTPATIENT
Start: 2022-06-17 | End: 2022-06-17

## 2022-06-17 RX ORDER — CEFAZOLIN SODIUM 1 G/3ML
INJECTION, POWDER, FOR SOLUTION INTRAMUSCULAR; INTRAVENOUS AS NEEDED
Status: DISCONTINUED | OUTPATIENT
Start: 2022-06-17 | End: 2022-06-17

## 2022-06-17 RX ORDER — ALBUMIN, HUMAN INJ 5% 5 %
SOLUTION INTRAVENOUS CONTINUOUS PRN
Status: DISCONTINUED | OUTPATIENT
Start: 2022-06-17 | End: 2022-06-17

## 2022-06-17 RX ORDER — MAGNESIUM HYDROXIDE 1200 MG/15ML
LIQUID ORAL AS NEEDED
Status: DISCONTINUED | OUTPATIENT
Start: 2022-06-17 | End: 2022-06-17 | Stop reason: HOSPADM

## 2022-06-17 RX ORDER — METOPROLOL TARTRATE 5 MG/5ML
10 INJECTION INTRAVENOUS EVERY 6 HOURS
Status: DISCONTINUED | OUTPATIENT
Start: 2022-06-17 | End: 2022-06-18

## 2022-06-17 RX ORDER — ACETYLCYSTEINE 200 MG/ML
3 SOLUTION ORAL; RESPIRATORY (INHALATION)
Status: DISCONTINUED | OUTPATIENT
Start: 2022-06-17 | End: 2022-06-21

## 2022-06-17 RX ORDER — ACETYLCYSTEINE 200 MG/ML
3 SOLUTION ORAL; RESPIRATORY (INHALATION)
Status: DISCONTINUED | OUTPATIENT
Start: 2022-06-17 | End: 2022-06-17

## 2022-06-17 RX ADMIN — ACETYLCYSTEINE 600 MG: 200 SOLUTION ORAL; RESPIRATORY (INHALATION) at 19:50

## 2022-06-17 RX ADMIN — INSULIN LISPRO 1 UNITS: 100 INJECTION, SOLUTION INTRAVENOUS; SUBCUTANEOUS at 12:13

## 2022-06-17 RX ADMIN — OXYCODONE HYDROCHLORIDE 10 MG: 5 SOLUTION ORAL at 21:36

## 2022-06-17 RX ADMIN — PROPOFOL 15 MCG/KG/MIN: 10 INJECTION, EMULSION INTRAVENOUS at 23:19

## 2022-06-17 RX ADMIN — DIGOXIN 250 MCG: 0.25 INJECTION INTRAMUSCULAR; INTRAVENOUS at 13:54

## 2022-06-17 RX ADMIN — NOREPINEPHRINE BITARTRATE 22 MCG/MIN: 1 INJECTION, SOLUTION, CONCENTRATE INTRAVENOUS at 23:19

## 2022-06-17 RX ADMIN — CEFAZOLIN SODIUM 3000 MG: 1 INJECTION, POWDER, FOR SOLUTION INTRAMUSCULAR; INTRAVENOUS at 12:50

## 2022-06-17 RX ADMIN — OXYCODONE HYDROCHLORIDE 10 MG: 5 SOLUTION ORAL at 03:19

## 2022-06-17 RX ADMIN — NOREPINEPHRINE BITARTRATE 6 MCG/MIN: 1 INJECTION, SOLUTION, CONCENTRATE INTRAVENOUS at 00:50

## 2022-06-17 RX ADMIN — ALBUMIN (HUMAN): 12.5 INJECTION, SOLUTION INTRAVENOUS at 14:46

## 2022-06-17 RX ADMIN — ROCURONIUM BROMIDE 50 MG: 50 INJECTION INTRAVENOUS at 13:34

## 2022-06-17 RX ADMIN — ROCURONIUM BROMIDE 50 MG: 50 INJECTION INTRAVENOUS at 12:32

## 2022-06-17 RX ADMIN — ROCURONIUM BROMIDE 50 MG: 50 INJECTION INTRAVENOUS at 15:10

## 2022-06-17 RX ADMIN — HEPARIN SODIUM 4000 UNITS: 1000 INJECTION INTRAVENOUS; SUBCUTANEOUS at 02:20

## 2022-06-17 RX ADMIN — MAGNESIUM SULFATE HEPTAHYDRATE 2 G: 2 INJECTION, SOLUTION INTRAVENOUS at 03:08

## 2022-06-17 RX ADMIN — Medication 20 MG: at 15:03

## 2022-06-17 RX ADMIN — IPRATROPIUM BROMIDE 0.5 MG: 0.5 SOLUTION RESPIRATORY (INHALATION) at 19:50

## 2022-06-17 RX ADMIN — SPIRONOLACTONE 50 MG: 50 TABLET ORAL at 09:19

## 2022-06-17 RX ADMIN — SODIUM CHLORIDE: 0.9 INJECTION, SOLUTION INTRAVENOUS at 12:50

## 2022-06-17 RX ADMIN — METOROPROLOL TARTRATE 5 MG: 5 INJECTION, SOLUTION INTRAVENOUS at 03:05

## 2022-06-17 RX ADMIN — AMIODARONE HYDROCHLORIDE 150 MG: 50 INJECTION, SOLUTION INTRAVENOUS at 03:57

## 2022-06-17 RX ADMIN — Medication 150 MCG/HR: at 09:10

## 2022-06-17 RX ADMIN — IPRATROPIUM BROMIDE 0.5 MG: 0.5 SOLUTION RESPIRATORY (INHALATION) at 16:20

## 2022-06-17 RX ADMIN — MIDAZOLAM 2 MG: 1 INJECTION INTRAMUSCULAR; INTRAVENOUS at 12:32

## 2022-06-17 RX ADMIN — ALBUMIN (HUMAN) 25 G: 12.5 INJECTION, SOLUTION INTRAVENOUS at 16:55

## 2022-06-17 RX ADMIN — ALBUMIN (HUMAN): 12.5 INJECTION, SOLUTION INTRAVENOUS at 12:50

## 2022-06-17 RX ADMIN — LEVALBUTEROL HYDROCHLORIDE 1.25 MG: 1.25 SOLUTION, CONCENTRATE RESPIRATORY (INHALATION) at 16:16

## 2022-06-17 RX ADMIN — ALBUMIN (HUMAN) 25 G: 12.5 INJECTION, SOLUTION INTRAVENOUS at 21:35

## 2022-06-17 RX ADMIN — ROCURONIUM BROMIDE 50 MG: 50 INJECTION INTRAVENOUS at 14:04

## 2022-06-17 RX ADMIN — ROCURONIUM BROMIDE 50 MG: 50 INJECTION INTRAVENOUS at 14:39

## 2022-06-17 RX ADMIN — NOREPINEPHRINE BITARTRATE 8 MCG/MIN: 1 INJECTION, SOLUTION, CONCENTRATE INTRAVENOUS at 10:11

## 2022-06-17 RX ADMIN — LEVALBUTEROL HYDROCHLORIDE 1.25 MG: 1.25 SOLUTION, CONCENTRATE RESPIRATORY (INHALATION) at 19:50

## 2022-06-17 RX ADMIN — AMIODARONE HYDROCHLORIDE 1 MG/MIN: 50 INJECTION, SOLUTION INTRAVENOUS at 04:09

## 2022-06-17 RX ADMIN — IPRATROPIUM BROMIDE 0.5 MG: 0.5 SOLUTION RESPIRATORY (INHALATION) at 07:03

## 2022-06-17 RX ADMIN — METOROPROLOL TARTRATE 5 MG: 5 INJECTION, SOLUTION INTRAVENOUS at 03:19

## 2022-06-17 RX ADMIN — PROPOFOL 15 MCG/KG/MIN: 10 INJECTION, EMULSION INTRAVENOUS at 02:21

## 2022-06-17 RX ADMIN — CHLORHEXIDINE GLUCONATE 15 ML: 1.2 SOLUTION ORAL at 09:16

## 2022-06-17 RX ADMIN — MIDAZOLAM 2 MG: 1 INJECTION INTRAMUSCULAR; INTRAVENOUS at 15:15

## 2022-06-17 RX ADMIN — PROPOFOL 15 MCG/KG/MIN: 10 INJECTION, EMULSION INTRAVENOUS at 07:28

## 2022-06-17 RX ADMIN — CALCIUM GLUCONATE 2 G: 20 INJECTION, SOLUTION INTRAVENOUS at 18:23

## 2022-06-17 RX ADMIN — VASOPRESSIN 0.04 UNITS/MIN: 20 INJECTION INTRAVENOUS at 23:19

## 2022-06-17 RX ADMIN — OXYCODONE HYDROCHLORIDE 10 MG: 5 SOLUTION ORAL at 09:17

## 2022-06-17 RX ADMIN — ACETYLCYSTEINE 600 MG: 200 SOLUTION ORAL; RESPIRATORY (INHALATION) at 16:20

## 2022-06-17 RX ADMIN — Medication: at 21:37

## 2022-06-17 RX ADMIN — VASOPRESSIN 0.04 UNITS/MIN: 20 INJECTION INTRAVENOUS at 17:05

## 2022-06-17 RX ADMIN — Medication 30 MG: at 13:17

## 2022-06-17 RX ADMIN — FENTANYL CITRATE 50 MCG: 50 INJECTION INTRAMUSCULAR; INTRAVENOUS at 12:09

## 2022-06-17 RX ADMIN — Medication 150 MCG/HR: at 01:31

## 2022-06-17 RX ADMIN — ROCURONIUM BROMIDE 50 MG: 50 INJECTION INTRAVENOUS at 13:00

## 2022-06-17 RX ADMIN — LEVALBUTEROL HYDROCHLORIDE 1.25 MG: 1.25 SOLUTION, CONCENTRATE RESPIRATORY (INHALATION) at 07:03

## 2022-06-17 RX ADMIN — PANTOPRAZOLE SODIUM 40 MG: 40 INJECTION, POWDER, FOR SOLUTION INTRAVENOUS at 09:08

## 2022-06-17 RX ADMIN — METOROPROLOL TARTRATE 5 MG: 5 INJECTION, SOLUTION INTRAVENOUS at 16:40

## 2022-06-17 RX ADMIN — HEPARIN SODIUM 21.1 UNITS/KG/HR: 10000 INJECTION, SOLUTION INTRAVENOUS at 02:20

## 2022-06-17 RX ADMIN — NOREPINEPHRINE BITARTRATE 12 MCG/MIN: 1 INJECTION, SOLUTION, CONCENTRATE INTRAVENOUS at 18:03

## 2022-06-17 RX ADMIN — CHLORHEXIDINE GLUCONATE 15 ML: 1.2 SOLUTION ORAL at 21:36

## 2022-06-17 RX ADMIN — DIGOXIN 250 MCG: 0.25 INJECTION INTRAMUSCULAR; INTRAVENOUS at 13:51

## 2022-06-17 RX ADMIN — METRONIDAZOLE: 500 INJECTION, SOLUTION INTRAVENOUS at 12:52

## 2022-06-17 RX ADMIN — METOROPROLOL TARTRATE 10 MG: 5 INJECTION, SOLUTION INTRAVENOUS at 21:36

## 2022-06-17 RX ADMIN — HYDROMORPHONE HYDROCHLORIDE 0.5 MG: 1 INJECTION, SOLUTION INTRAMUSCULAR; INTRAVENOUS; SUBCUTANEOUS at 13:11

## 2022-06-17 RX ADMIN — PROPOFOL 15 MCG/KG/MIN: 10 INJECTION, EMULSION INTRAVENOUS at 12:09

## 2022-06-17 RX ADMIN — PROPOFOL 15 MCG/KG/MIN: 10 INJECTION, EMULSION INTRAVENOUS at 18:09

## 2022-06-17 RX ADMIN — OXYCODONE HYDROCHLORIDE 10 MG: 5 SOLUTION ORAL at 16:29

## 2022-06-17 RX ADMIN — Medication 150 MCG/HR: at 16:49

## 2022-06-17 RX ADMIN — METOROPROLOL TARTRATE 5 MG: 5 INJECTION, SOLUTION INTRAVENOUS at 09:08

## 2022-06-17 RX ADMIN — LEVOTHYROXINE SODIUM 25 MCG: 25 TABLET ORAL at 05:04

## 2022-06-17 NOTE — NUTRITION
06/17/22 1645   Recommendations/Interventions   Intervention Comments Pt receiving 618kcals from propofol  Would not add lipids to bag at this time  Recommendations to Provider Recommend: 1000ml D20   800ml  AA15   Provides: 200g CHO   59mg/kg/min 680kcals from CHO  Amino Acids: 120g  480kcals  Total Kcals including propofol: 1778kcals/day  Permissive underfeeding recommended per ASPEN guidelines

## 2022-06-17 NOTE — ANESTHESIA POSTPROCEDURE EVALUATION
Post-Op Assessment Note    CV Status:  Unstable  Pain Score: 0    Pain management: adequate     Mental Status:  Unresponsive   Hydration Status:  Euvolemic and unstable   PONV Controlled:  Controlled   Airway Patency:  Patent  Airway: intubated      Post Op Vitals Reviewed: Yes      Staff: CRNA   Comments: patient transported to MICU 5, on O2 via ETT, monitiors transduced, presoors titrated to MAP > 65, propofol gtt started, report given to ICU RN @ bedside, no further interventions @ this time, will cont  to monitor  No complications documented      BP   110/65   Temp 36 9   Pulse 120   Resp 14   SpO2   96 @ 100 fiO2

## 2022-06-17 NOTE — PROGRESS NOTES
S: Karol Dueñas is a 47 y o  male who returns to the ICU s/p abdominal washout, bridging vicryl mesh, partial closure, VAC change  EBL minimal  Patient tolerated this without complications, returned to the MICU intubated and sedated      O:    Vitals:    06/17/22 1150   BP:    Pulse:    Resp:    Temp: 100 4 °F (38 °C)   SpO2:      Tachy to 130s, given 5 metop  Getting 500cc albumin    S /8 80%    Levo @ 11 (6 pre operatively)  Vaso now added  Prop/fent  amio gtt    General: intubated, sedated  Skin: Warm, dry, anicteric  HEENT: Normocephalic, atraumatic  CV: RRR, no m/r/g  Pulm: CTA b/l, no inc WOB  Abd: Soft, ND/NT, abdominal VAC in place  MSK: Symmetric, no edema, no tenderness, no deformity    A: 52M with super-morbid obesity with BMI of 73, s/p w/ SBO and chronic incarcerated ventral hernia and loss of domain s/p:   6/14 ex-lap extensive HARVEY for SBO, abthera vac placement   6/15 2nd look ex lap, Abthera change, DPR initiation  6/17: abdominal washout, bridging vicryl mesh, partial closure, VAC change    P:  - NPO/NGT/TPN  - abdominal VAC, monitor output and character  - f/u post op labs, CXR  - art line for BP monitoring, currently levo and vaso, wean pressors as tolerated  - wean vent/fio2 as tolerated  - hep gtt, amio gtt  - carmen for accute I/O  - appreciate ICU care

## 2022-06-17 NOTE — RESPIRATORY THERAPY NOTE
06/16/22 1951   Inhalation Therapy Tx   $ Inhalation Therapy Performed Yes   $ Pulse Oximetry Spot Check Charge Completed   SpO2 94 %   Pre-Treatment Pulse 134   Pre-Treatment Respirations 18   Breath Sounds Pre-Treatment Bilateral Diminished;Clear   Breath Sounds Post-Treatment Bilateral Diminished;Clear   Post-Treatment Pulse 140   Post-Treatment Respirations 18   Delivery Source Aerogen;Vent   Position Semi Georges's   Treatment Tolerance Tolerated well   Resp Comments Patient currently orally intubated, on assisted ventilation  He is on (S)CMV, RR 18, Vt 490(6 5cc/kg), PEEP 8, FiO2 40%, Pplat 20,   Will monitor closely overnight

## 2022-06-17 NOTE — QUICK NOTE
Postop Check    Procedure: Abdominal washout, repair of serosal tears, bridging vicryl mesh, partial closure, application of wound vac     Subjective:  No acute distress  Intubated and sedated, unable to offer subjective complaints  Objective  Vitals:    06/17/22 1115 06/17/22 1125 06/17/22 1130 06/17/22 1150   BP: (!) 83/48 (!) 82/49 104/58    Pulse: (!) 114 (!) 118 (!) 118    Resp:       Temp: 100 04 °F (37 8 °C) 100 04 °F (37 8 °C) 100 04 °F (37 8 °C) 100 4 °F (38 °C)   TempSrc:       SpO2: 98% 97% 98%    Weight:         GENERAL: No acute distress  CV: AF RVR rates 115-130  LUNGS: Coarse BL breath sounds, thick inline secretions, peak pressures 40-45 on ventilator   ABDOMEN: Abdomen soft, large midline incision with wound vac in place to suction     Hypotensive on levophed and hypoxic on ventilator postoperatively  Levophed 7 pre-op increased to 11 and MAPs 55-60  Fio2 40% pre-op, now on 100% post-op for spo2 in 80s      Give 500 cc albumin and continue fluid resuscitation as appropriate   Levophed up titrating now 11, MAP goal >65   Add vasopressin  Check STAT labs including lactic acid and ABG   STAT CXR to rule out PTX with worsening hypoxia   Monitor peak airway pressures   Wean FIO2 for Spo2 goal >88%    Debbie Pearson PA-C

## 2022-06-17 NOTE — PROGRESS NOTES
Progress Note - General Surgery   Serina Mantilla 47 y o  male MRN: [id number]  Unit/Bed#: MICU 05 Encounter: [id number]    Assessment:  52M with super-morbid obesity with BMI of 73, s/p w/ SBO and chronic incarcerated ventral hernia and loss of domain s/p:   6/14 ex-lap extensive HARVEY for SBO, abthera vac placement   6/15 2nd look ex lap, Abthera change, DPR initiation    Gtt: Levo 6 (max 8), amio, fent, heparin, prop,   Vent: , PEEP 8, 40%    Plan:   OR today for potential closure  o NPO  o Hold heparin gtt at 6 am   Appreciate ICU level of care  o Wean pressors, wean vent  o Trend endpoints   Continue DPR, monitor vac output   Please TigerText on call Red Surgery or Acute Care Surgery Floor Call with any questions     Subjective/Objective     Subjective:   No acute events overnight  Started on him an attempt to lower heart rate  Per nursing blood pressure seemed to drop scheduled metoprolol dosing  Pertinent review of systems as above  All other review of systems negative  Objective:    Blood pressure (!) 73/56, pulse (!) 126, temperature 100 04 °F (37 8 °C), resp  rate 20, weight (!) 234 kg (516 lb 5 1 oz), SpO2 97 %  ,Body mass index is 72 01 kg/m²  Intake/Output Summary (Last 24 hours) at 6/17/2022 0516  Last data filed at 6/17/2022 0502  Gross per 24 hour   Intake 6323 34 ml   Output 5835 ml   Net 488 34 ml       Invasive Devices  Report    Peripherally Inserted Central Catheter Line  Duration           PICC Line 80/44/26 Right Basilic 3 days          Central Venous Catheter Line  Duration           CVC Central Lines 06/14/22 Triple 2 days          Drain  Duration           NG/OG/Enteral Tube Right nare 5 days    Urethral Catheter Latex 16 Fr  2 days    Closed/Suction Drain Right Abdomen Bulb 24 Fr  1 day          Airway  Duration           ETT  Hi-Lo; Cuffed;Oral 8 mm 2 days                Physical Exam:   Gen:  NAD  HEENT: NCAT   MMM  CV: well perfused  Lungs: Normal respiratory effort  Abd: soft, nt/nd,vac intact, YANCY drain intact  Skin: warm/ dry  Extremities: no peripheral edema, no clubbing or cyanosis  Neuro: intubated /sedated      Results from last 7 days   Lab Units 06/16/22  0406 06/15/22  1409 06/15/22  0449   WBC Thousand/uL 11 53* 10 59* 9 64   HEMOGLOBIN g/dL 10 7* 12 1 11 2*   HEMATOCRIT % 33 4* 37 9 34 6*   PLATELETS Thousands/uL 197 171 168     Results from last 7 days   Lab Units 06/16/22  0406 06/15/22  1409 06/15/22  0650 06/15/22  0449   POTASSIUM mmol/L 4 6 4 7 5 3 5 2   CHLORIDE mmol/L 102 102  --  103   CO2 mmol/L 31 28  --  32   BUN mg/dL 12 8  --  6   CREATININE mg/dL 0 90 0 78  --  0 65   CALCIUM mg/dL 8 4 8 7  --  8 7     Results from last 7 days   Lab Units 06/17/22  0130 06/16/22  1833 06/16/22  1117 06/15/22  2112 06/15/22  0449   INR   --   --   --   --  1 22*   PTT seconds 39* 43* 35   < > 32    < > = values in this interval not displayed  I have personally reviewed pertinent films in PACS      Medications:   Scheduled Meds:  Current Facility-Administered Medications   Medication Dose Route Frequency Provider Last Rate    amiodarone  1 mg/min Intravenous Continuous Irma Anderson MD 1 mg/min (06/17/22 0409)    Followed by   Aurelio Khan amiodarone  0 5 mg/min Intravenous Continuous Irma Anderson MD      cefazolin  2,000 mg Intravenous On Call To [address] [place] [address], PA-C      chlorhexidine  15 mL Mouth/Throat Q12H [address], MD      dianeal PD-2 5%  125 mL/hr Dialysis Continuous Lizzie Crump  mL/hr (06/16/22 1100)    fentaNYL  150 mcg/hr Intravenous Continuous Ng Madelyn, CRNP 150 mcg/hr (06/17/22 0131)    fentanyl citrate (PF)  50 mcg Intravenous Q1H PRN Church Road[address] Hutton PA-C      heparin (porcine)  3-20 Units/kg/hr (Order-Specific) Intravenous Titrated Lizzie Crump MD 21 1 Units/kg/hr (06/17/22 0500)    heparin (porcine)  2,000 Units Intravenous Q1H PRN Lizzie Crump MD      heparin (porcine)  4,000 Units Intravenous Q1H PRN Nara Denis MD      insulin lispro  1-5 Units Subcutaneous Q6H [address] Nara Denis MD      ipratropium  0 5 mg Nebulization TID Nara Denis MD      levalbuterol  1 25 mg Nebulization TID Nara Denis MD      levothyroxine  25 mcg Oral Early Morning Luis Rangel MD      metoprolol  5 mg Intravenous Q6H Luis Rangel MD      metroNIDAZOLE  500 mg Intravenous On Call To [address] [place] [address] PADae      norepinephrine  1-30 mcg/min Intravenous Titrated Loy Leyden Burkett, PA-C 7 mcg/min (06/17/22 0447)    oxyCODONE  10 mg Oral Q6H Ng Madelyn CRBLANCA      pantoprazole  40 mg Intravenous Q24H [address] Chang Cooley MD      propofol  5-50 mcg/kg/min Intravenous Titrated Nara Denis MD 15 mcg/kg/min (06/17/22 0221)    spironolactone  50 mg Oral Daily Luis Rangel MD       Continuous Infusions:amiodarone, 1 mg/min, Last Rate: 1 mg/min (06/17/22 0409)   Followed by  amiodarone, 0 5 mg/min  dianeal PD-2 5%, 125 mL/hr, Last Rate: 125 mL/hr (06/16/22 1100)  fentaNYL, 150 mcg/hr, Last Rate: 150 mcg/hr (06/17/22 0131)  heparin (porcine), 3-20 Units/kg/hr (Order-Specific), Last Rate: 21 1 Units/kg/hr (06/17/22 0220)  norepinephrine, 1-30 mcg/min, Last Rate: 7 mcg/min (06/17/22 0447)  propofol, 5-50 mcg/kg/min, Last Rate: 15 mcg/kg/min (06/17/22 0221)      PRN Meds:  fentanyl citrate (PF), 50 mcg, Q1H PRN  heparin (porcine), 2,000 Units, Q1H PRN  heparin (porcine), 4,000 Units, Q1H PRN      VTE Pharmacologic Prophylaxis: Heparin Drip  VTE Mechanical Prophylaxis: sequential compression device

## 2022-06-17 NOTE — ANESTHESIA PREPROCEDURE EVALUATION
Procedure:  CHANGE DRESSING/VAC ABDOMEN, possible closure with mesh (N/A Abdomen)    Relevant Problems   CARDIO   (+) Atrial fibrillation with RVR (HCC)   (+) Atrial fibrillation with rapid ventricular response (HCC)   (+) Hypertension      ENDO   (+) Hypothyroidism      GI/HEPATIC   (+) SBO      NEURO/PSYCH   (+) Depression   (+) History of DVT (deep vein thrombosis)      PULMONARY   (+) Acute respiratory failure (HCC)   (+) COPD (chronic obstructive pulmonary disease) (HCC)   (+) Chronic respiratory failure with hypoxia [facility])        Physical Exam    Airway       Dental       Cardiovascular      Pulmonary      Other Findings  Patient is intubated      Anesthesia Plan  ASA Score- 4     Anesthesia Type- general with ASA Monitors  Additional Monitors: arterial line  Airway Plan: ETT  Plan Factors-    Chart reviewed  EKG reviewed  Patient summary reviewed  Patient is not a current smoker  Patient did not smoke on day of surgery  Induction- intravenous and inhalational     Postoperative Plan-     Informed Consent- Plan/risks discussed with: Patient intubated and unable to gvie consent  I personally reviewed this patient with the CRNA  Discussed and agreed on the Anesthesia Plan with the CRNA  Renea Pierce

## 2022-06-17 NOTE — PROGRESS NOTES
Daily Progress Note - Critical Care   Graciela Kuhn 47 y o  male MRN: [id number]  Unit/Bed#: MICU 05 Encounter: [id number]        ----------------------------------------------------------------------------------------  HPI:  Buddy Lazar a 47 y  o  male who presents with open abdomen after surgery for SBO  Patient was initially [place] for abdominal pain and constipation  CT showed SBO and patient was transferred to [place] for surgery  Patient underwent ex-lap and adhesions were removed  Patient had a large fascial defect with loss of domain due to his morbid obesity which required patient to be placed on wound VAC  Patient was transferred to the ICU and later decision was made to transfer to [facility] for further management and care on 6/14  On 6/15 pt returned to OR for second look exploratory laparotomy with repair of serosal injury and change dressing/vac abdomen and initiation of DPR  24hr events: Overnight, pt was tachycardic with rates up to 150s and given his afib was started on amnio drip  He was given lopressor 5 while waiting on amnio to be delivered to unit  Additionally, he continued requiring vasopressor support for borderline low MAPs  Plan to return to OR today for possible closure      ---------------------------------------------------------------------------------------  SUBJECTIVE  Intubated, but nods to questioning  Pt endorses pain better controlled on scheduled oxy started yesterday  Review of Systems  Review of systems was unable to be performed secondary to intubation  ---------------------------------------------------------------------------------------  Assessment and Plan:    Neuro:   · Analgesia/sedation  ? Gtts: fentanyl 150, propofol 15  ? Scheduled: oxy 10 Q6  ? PRN: fentanyl 50 Q1 for agitation  § No doses required since beginning scheduled oxy 6/16  ? delirium ppx:  § Goal RASS 0 to -1  § CAM ICU  · Diagnosis: depression  ?  Plan: resume home wellbutrin when able      CV:   · Diagnosis: acute hypotension  ? Low suspicion given poor fit of bp cuff and continued stable mental status  ? Overnight: MAPs low 60s overnight  ? Gtt: levo 8  ? Plan: continuous monitoring, wean levo as able to maintain MAP>65  · Diagnosis: atrial fibrillation with rapid ventricular response  ? Home meds: metoprolol 100 BID, pradaxa  ? Metoprolol reduced to half 6/16 for low MAPS  ? Overnight: HR up to 150, started on amnio drip  § Required 5 lopressor while waiting for amnio to arrive from pharmacy  ? Plan: amnio and heparin gtt  § Heparin drip held at 6am for OR today, will resume post op  ? Continue lopressor 5   ? Continue holding diltiazem  · Diagnosis: chronic diastolic heart failure  ? Home meds: torsemide 40 BID and aldactone 50 daily  ? Plan: diltiazem 5 held i/s/o acute hypotension  ? continuous monitoring, maintain MAP >65      Pulm:  · Diagnosis: mechanical ventilation  ? AV/VC 18/490/40/8  ? Plan: wean to extubate  ? SBT daily, will try more aggressively post op  ? respiratory protocol  ? Maintain sat>88%  · Diagnosis: COPD  ? Plan: ipratropium TID, levalbuterol TID  · Diagnosis: obesity hypoventilation syndrome  ? Plan: reevaluate when extubated      GI:   · Diagnosis: Small bowel obstruction  ? S/p ex lap HARVEY with abthera placement 6/14, 2nd look ex lap with repair of serosal injury, abthera placement and initiation of DPR 6/15  ? In- DPR: 2 8L  ? Out- NGT: 550cc  ? Out- wound vac: 3 9L  ? Abdominal exam unchanged, dressings C,D,I  ? Plan: to OR for 3rd look laparotomy, possible closure, possible mesh today 6/17  ? Continue DPR at 125cc/hr until OR  · Diagnosis: h/o bariatric surgery  ? Plan: surgical team aware  · GI ppx  ? Home meds: pepcid  ? Plan: Protonix IV      :   · Diagnosis: no acute issues  ? UOP: 700cc  ? Plan: continue to trend BUN/creat  § 6/17: 13/0 73  ? Maintain carmen for OR today  ?  Strict I/O monitoring       F/E/N:   · F: none, will reevaluate volume status post op  · E: monitor electrolytes and replete as needed to maintain K>4 0, Mg>2 0, Phos >3 0  · N: NPO for OR today  o Nutrition consulted, recommended initiating standard TPN      Heme/Onc:   · emoglobin trend 10 2 from 10 7 from 12 2  ? Plan: continue to monitor daily  ? Transfuse for Hgb <7 0  · Diagnosis: h/o DVT  ? Home meds: pradaxa  ? IVC filter- unable to be retrieved during surgery  ? Plan: heparin drip  § Heparin drip held at 6am for OR today, will resume post op         Endo:   · Diagnosis: h/o hypothyroidism  ? TSH: 1 78  ? Plan: home levothyroxine  · blood glucose control  ? Most recent A1c: 5 3  ? Plan: ISS, currently algorithm 2  ? Goal -180      ID:   · Diagnosis: no acute issues  ? Overnight febrile to 100 8 and increasing WBC 14 6 from 11 5  ? Increased WBC likely due to stress reaction i/s/o major abdominal surgery  ? Plan: trend fever curve and WBC for evidence of infection      MSK/Skin:   · Diagnosis: morbid obesity  ? BMI 73 22  ? H/o gastric sleeve  ? Currently resides in nursing facility  ? Plan: surgery team aware  · Diagnosis: pressure ulcer, buttocks  ? Plan: wound care  · Pressure ulcer ppx  ? Plan: frequent turning/repositioning and offloading  ? PT/OT when appropriate  § Unclear baseline functional status    Patient appropriate for transfer out of the ICU today?: No  Disposition: Continue Critical Care   Code Status: Level 1 - Full Code  ---------------------------------------------------------------------------------------  ICU CORE MEASURES    Prophylaxis   VTE Pharmacologic Prophylaxis: Heparin Drip  VTE Mechanical Prophylaxis: sequential compression device  Stress Ulcer Prophylaxis: Pantoprazole IV     ABCDE Protocol (if indicated)  Plan to perform spontaneous awakening trial today? Not applicable  Plan to perform spontaneous breathing trial today? Yes  Obvious barriers to extubation?  Yes  CAM-ICU: Negative    Invasive Devices Review  Invasive Devices  Report Peripherally Inserted Central Catheter Line  Duration           PICC Line  Right Basilic 3 days          Central Venous Catheter Line  Duration           CVC Central Lines 22 Triple 2 days          Drain  Duration           NG/OG/Enteral Tube Right nare 5 days    Urethral Catheter Latex 16 Fr  2 days    Closed/Suction Drain Right Abdomen Bulb 24 Fr  1 day          Airway  Duration           ETT  Hi-Lo; Cuffed;Oral 8 mm 2 days              Can any invasive devices be discontinued today? Yes, possible abthera vac and drain, possibly ETT post op  ---------------------------------------------------------------------------------------  OBJECTIVE    Vitals   Vitals:    22 0515 22 0530 22 0545 22 0600   BP: 102/62 (!) 85/54 (!) 73/53 92/52   Pulse: (!) 124 (!) 122 (!) 126 (!) 126   Resp:       Temp: 99 68 °F (37 6 °C) 100 04 °F (37 8 °C) 100 04 °F (37 8 °C) 100 04 °F (37 8 °C)   TempSrc:       SpO2: 97% 97% 97% 97%   Weight:         Temp (24hrs), Av °F (37 8 °C), Min:99 3 °F (37 4 °C), Max:100 8 °F (38 2 °C)  Current: Temperature: 100 04 °F (37 8 °C)      Respiratory:  SpO2: SpO2: 97 %       Invasive/non-invasive ventilation settings   Respiratory  Report   Lab Data (Last 4 hours)    None         O2/Vent Data (Last 4 hours)    None                Physical Exam  Constitutional:       Appearance: He is obese  He is ill-appearing  HENT:      Head: Normocephalic and atraumatic  Nose: Nose normal       Mouth/Throat:      Mouth: Mucous membranes are moist       Pharynx: Oropharynx is clear  Eyes:      Conjunctiva/sclera: Conjunctivae normal       Pupils: Pupils are equal, round, and reactive to light  Cardiovascular:      Rate and Rhythm: Tachycardia present  Rhythm irregular  Pulses: Normal pulses  Heart sounds: Normal heart sounds  Comments: afib  Pulmonary:      Breath sounds: Normal breath sounds        Comments: intubated  Abdominal:      General: Bowel sounds are normal       Tenderness: There is abdominal tenderness  Musculoskeletal:         General: Normal range of motion  Cervical back: Normal range of motion  Comments: Difficult to access edema due to body habitus   Skin:     General: Skin is warm and dry  Comments: Pressure wounds on back x3   Neurological:      Mental Status: He is alert        Comments: Intubated, but nods appropriately and follows commands in all 4 extremities             Laboratory and Diagnostics:  Results from last 7 days   Lab Units 06/17/22  0458 06/16/22  0406 06/15/22  1409 06/15/22  0449 06/14/22  1847 06/14/22  1339 06/14/22  0556 06/13/22  0636   WBC Thousand/uL 14 58* 11 53* 10 59* 9 64 9 04 5 18 4 93 6 10   HEMOGLOBIN g/dL 10 2* 10 7* 12 1 11 2* 11 4* 11 7* 10 9* 11 4*   HEMATOCRIT % 31 9* 33 4* 37 9 34 6* 34 9* 36 2* 34 0* 36 1*   PLATELETS Thousands/uL 216 197 171 168 162 168 157 170   NEUTROS PCT % 83* 79* 83* 86*  --  84* 69 70   MONOS PCT % 6 10 8 7  --  7 9 11     Results from last 7 days   Lab Units 06/17/22  0458 06/16/22  0406 06/15/22  1409 06/15/22  0650 06/15/22  0449 06/14/22  1847 06/14/22  1339 06/13/22  0636 06/12/22  0806   SODIUM mmol/L 135* 137 136  --  136 135* 138 136 135*   POTASSIUM mmol/L 4 3 4 6 4 7 5 3 5 2 4 8 4 9 4 7 4 6   CHLORIDE mmol/L 101 102 102  --  103 104 102 100 96*   CO2 mmol/L 32 31 28  --  32 26 26 31 35*   ANION GAP mmol/L 2* 4 6  --  1* 5 10 5 4   BUN mg/dL 13 12 8  --  6 6 7 6 6   CREATININE mg/dL 0 78 0 90 0 78  --  0 65 0 62 0 65 0 73 0 76   CALCIUM mg/dL 8 4 8 4 8 7  --  8 7 9 0 8 1* 8 8 8 8   GLUCOSE RANDOM mg/dL 143* 128 155*  --  142* 176* 175* 127 135   ALT U/L  --   --   --   --  29 32 30 44 57   AST U/L  --   --   --   --  15 19 21 35 38   ALK PHOS U/L  --   --   --   --  63 65 72 83 96   ALBUMIN g/dL  --   --   --   --  2 3* 2 4* 2 5* 2 6* 2 7*   TOTAL BILIRUBIN mg/dL  --   --   --   --  0 70 1 04* 1 07* 0 60 0 70     Results from last 7 days   Lab Units 22  0458 22  0406 06/15/22  1409 06/15/22  0449 22  1339 22  0636 22  0806   MAGNESIUM mg/dL 2 7* 2 2 2 3 2 3 1 7 2 2 2 0   PHOSPHORUS mg/dL 3 7 4 4 4 7* 4 0 3 5  --  4 0      Results from last 7 days   Lab Units 22  0130 22  1833 22  1117 22  0406 06/15/22  2112 06/15/22  0449 22  0700   INR   --   --   --   --   --  1 22*  --    PTT seconds 39* 43* 35 33 29 32 22*          Results from last 7 days   Lab Units 22  0947   LACTIC ACID mmol/L 1 1     ABG:  Results from last 7 days   Lab Units 22  1354   PH ART  7 406   PCO2 ART mm Hg 36 9   PO2 ART mm Hg 178 5*   HCO3 ART mmol/L 22 7   BASE EXC ART mmol/L -1 6   ABG SOURCE  Line, Arterial     VBG:  Results from last 7 days   Lab Units 22  0947 22  1354   PH TRACI  7 353  --    PCO2 TRACI mm Hg 58 0*  --    PO2 TRACI mm Hg 40 4  --    HCO3 TRACI mmol/L 31 5*  --    BASE EXC TRACI mmol/L 4 7  --    ABG SOURCE   --  Line, Arterial           Micro        EK/15 impression:  Atrial fibrillation with rapid ventricular response  Low voltage QRS  Abnormal ECG  Imaging: no new imaging I have personally reviewed pertinent films in PACS    Intake and Output  I/O       06/15 07 07 0701   0700    I V  (mL/kg) 2500 2 (10 7) 2723 1 (11 6)    NG/GT 2315 4 3055    IV Piggyback 600     Total Intake(mL/kg) 5415 6 (23 2) 5778 1 (24 7)    Urine (mL/kg/hr) 1725 (0 3) 700 (0 1)    Emesis/NG output 650 550    Drains 3550 3925    Total Output 5925 5175    Net -509 4 +603 1              UOP: 0 2 ml/cc/hr     Height and Weights         Body mass index is 72 01 kg/m²    Weight (last 2 days)     Date/Time Weight    22 0500 234 (519 32)    22 0538 233 (626 72)            Nutrition       Diet Orders   (From admission, onward)             Start     Ordered    22 1836  Diet NPO  Diet effective now        References:    Nutrtion Support Algorithm Enteral vs  Parenteral   Question Answer Comment   Diet Type NPO    RD to adjust diet per protocol?  No        06/14/22 1835    06/14/22 1836  Room Service  Once        Question:  Type of Service  Answer:  Room Service-Appropriate    06/14/22 1835                Active Medications  Scheduled Meds:  Current Facility-Administered Medications   Medication Dose Route Frequency Provider Last Rate    amiodarone  1 mg/min Intravenous Continuous Agustin Dickerson MD 1 mg/min (06/17/22 0409)    Followed by   Lore Acosta amiodarone  0 5 mg/min Intravenous Continuous Agustin Dickerson MD      cefazolin  2,000 mg Intravenous On Call To [address] [place] [address] PADae      chlorhexidine  15 mL Mouth/Throat Q12H [address], MD      dianeal PD-2 5%  125 mL/hr Dialysis Continuous Sandoval Richardson  mL/hr (06/16/22 1100)    fentaNYL  150 mcg/hr Intravenous Continuous Jennifer Ocean, CRNP 150 mcg/hr (06/17/22 0131)    fentanyl citrate (PF)  50 mcg Intravenous Q1H PRN Beatriz Crandall PA-C      heparin (porcine)  3-20 Units/kg/hr (Order-Specific) Intravenous Titrated Sandoval Richardson MD Stopped (06/17/22 0516)    heparin (porcine)  2,000 Units Intravenous Q1H PRN Sandoval Richardson MD      heparin (porcine)  4,000 Units Intravenous Q1H PRN Sandoval Richardson MD      insulin lispro  1-5 Units Subcutaneous Q6H [address] Sandoval Richardson MD      ipratropium  0 5 mg Nebulization TID Sandoval Richardson MD      levalbuterol  1 25 mg Nebulization TID Sandoval Richardson MD      levothyroxine  25 mcg Oral Early Morning Noah Roblero MD      metoprolol  5 mg Intravenous Q6H Noah Roblero MD      metroNIDAZOLE  500 mg Intravenous On Call To [address] [place] [address] [place]      norepinephrine  1-30 mcg/min Intravenous Titrated Mira Hutton PA-C 8 mcg/min (06/17/22 0547)    oxyCODONE  10 mg Oral Q6H Ng Madelyn, CRNP      pantoprazole  40 mg Intravenous Q24H [address] Monique MD      propofol  5-50 mcg/kg/min Intravenous Titrated Chang Lisandra Henderson MD 15 mcg/kg/min (06/17/22 0221)    spironolactone  50 mg Oral Daily Joel Guido MD       Continuous Infusions:  amiodarone, 1 mg/min, Last Rate: 1 mg/min (06/17/22 0409)   Followed by  amiodarone, 0 5 mg/min  dianeal PD-2 5%, 125 mL/hr, Last Rate: 125 mL/hr (06/16/22 1100)  fentaNYL, 150 mcg/hr, Last Rate: 150 mcg/hr (06/17/22 0131)  heparin (porcine), 3-20 Units/kg/hr (Order-Specific), Last Rate: Stopped (06/17/22 0516)  norepinephrine, 1-30 mcg/min, Last Rate: 8 mcg/min (06/17/22 0547)  propofol, 5-50 mcg/kg/min, Last Rate: 15 mcg/kg/min (06/17/22 0221)      PRN Meds:   fentanyl citrate (PF), 50 mcg, Q1H PRN  heparin (porcine), 2,000 Units, Q1H PRN  heparin (porcine), 4,000 Units, Q1H PRN        Allergies   Allergies   Allergen Reactions    Penicillins Hives     ---------------------------------------------------------------------------------------  Advance Directive and Living Will:      Power of :    POLST:    ---------------------------------------------------------------------------------------    Yenny Jane, MS-4  [place]      Portions of the record may have been created with voice recognition software  Occasional wrong word or "sound a like" substitutions may have occurred due to the inherent limitations of voice recognition software    Read the chart carefully and recognize, using context, where substitutions have occurred

## 2022-06-17 NOTE — RESPIRATORY THERAPY NOTE
RT Ventilator Management Note  Lenin Giraldo 47 y o  male MRN: [id number]  Unit/Bed#: MICU 05 Encounter: [id number]      Daily Screen         6/16/2022  0717 6/17/2022  0703          Patient safety screen outcome[de-identified] Failed Failed (P)       Not Ready for Weaning due to[de-identified] Underline problem not resolved;Going on Transport intubated Underline problem not resolved (P)                 Physical Exam:   Assessment Type: Pre-treatment  General Appearance: Sedated  Respiratory Pattern: Assisted  Chest Assessment: Chest expansion symmetrical  Bilateral Breath Sounds: Diminished, Clear  Cough: None  O2 Device: VENT      Resp Comments: (P) Pt remains on SCMV  Pt for OR today   No wean this am

## 2022-06-17 NOTE — PLAN OF CARE
Problem: Prexisting or High Potential for Compromised Skin Integrity  Goal: Skin integrity is maintained or improved  Description: INTERVENTIONS:  - Identify patients at risk for skin breakdown  - Assess and monitor skin integrity  - Assess and monitor nutrition and hydration status  - Monitor labs   - Assess for incontinence   - Turn and reposition patient  - Assist with mobility/ambulation  - Relieve pressure over bony prominences  - Avoid friction and shearing  - Provide appropriate hygiene as needed including keeping skin clean and dry  - Evaluate need for skin moisturizer/barrier cream  - Collaborate with interdisciplinary team   - Patient/family teaching  - Consider wound care consult   Outcome: Progressing     Problem: Potential for Falls  Goal: Patient will remain free of falls  Description: INTERVENTIONS:  - Educate patient/family on patient safety including physical limitations  - Instruct patient to call for assistance with activity   - Consult OT/PT to assist with strengthening/mobility   - Keep Call bell within reach  - Keep bed low and locked with side rails adjusted as appropriate  - Keep care items and personal belongings within reach  - Initiate and maintain comfort rounds  - Make Fall Risk Sign visible to staff  - Apply yellow socks and bracelet for high fall risk patients  - Consider moving patient to room near nurses station  Outcome: Progressing     Problem: MOBILITY - ADULT  Goal: Maintain or return to baseline ADL function  Description: INTERVENTIONS:  -  Assess patient's ability to carry out ADLs; assess patient's baseline for ADL function and identify physical deficits which impact ability to perform ADLs (bathing, care of mouth/teeth, toileting, grooming, dressing, etc )  - Assess/evaluate cause of self-care deficits   - Assess range of motion  - Assess patient's mobility; develop plan if impaired  - Assess patient's need for assistive devices and provide as appropriate  - Encourage maximum independence but intervene and supervise when necessary  - Involve family in performance of ADLs  - Assess for home care needs following discharge   - Consider OT consult to assist with ADL evaluation and planning for discharge  - Provide patient education as appropriate  Outcome: Progressing  Goal: Maintains/Returns to pre admission functional level  Description: INTERVENTIONS:  - Perform BMAT or MOVE assessment daily    - Set and communicate daily mobility goal to care team and patient/family/caregiver  - Collaborate with rehabilitation services on mobility goals if consulted  - Perform Range of Motion 3 times a day  - Reposition patient every 2 hours  - Record patient progress and toleration of activity level   Outcome: Progressing     Problem: Nutrition/Hydration-ADULT  Goal: Nutrient/Hydration intake appropriate for improving, restoring or maintaining nutritional needs  Description: Monitor and assess patient's nutrition/hydration status for malnutrition  Collaborate with interdisciplinary team and initiate plan and interventions as ordered  Monitor patient's weight and dietary intake as ordered or per policy  Utilize nutrition screening tool and intervene as necessary  Determine patient's food preferences and provide high-protein, high-caloric foods as appropriate       INTERVENTIONS:  - Monitor oral intake, urinary output, labs, and treatment plans  - Assess nutrition and hydration status and recommend course of action  - Evaluate amount of meals eaten  - Assist patient with eating if necessary   - Allow adequate time for meals  - Recommend/ encourage appropriate diets, oral nutritional supplements, and vitamin/mineral supplements  - Order, calculate, and assess calorie counts as needed  - Recommend, monitor, and adjust tube feedings and TPN/PPN based on assessed needs  - Assess need for intravenous fluids  - Provide specific nutrition/hydration education as appropriate  - Include patient/family/caregiver in decisions related to nutrition  Outcome: Progressing

## 2022-06-17 NOTE — ANESTHESIA PROCEDURE NOTES
Arterial Line Insertion  Performed by: Lakeisha Dennis CRNA  Authorized by: Lakeisha Dennis CRNA   Risks and benefits: risks, benefits and alternatives were discussed  Consent given by: patient  Patient understanding: patient states understanding of the procedure being performed  Patient consent: the patient's understanding of the procedure matches consent given  Procedure consent: procedure consent matches procedure scheduled  Patient identity confirmed: arm band and hospital-assigned identification number  Preparation: Patient was prepped and draped in the usual sterile fashion    Indications: hemodynamic monitoring  Orientation:  Left  Location: radial artery  Sedation:  Patient sedated: yes  Sedatives: fentanyl, propofol and midazolam  Analgesia: fentanyl    Procedure Details:  Kb's test normal: yes  Needle gauge: 20  Seldinger technique: Seldinger technique used  Number of attempts: 1    Post-procedure:  Post-procedure: dressing applied  Waveform: good waveform and waveform confirmed  Post-procedure CNS: normal and unchanged  Patient tolerance: Patient tolerated the procedure well with no immediate complications

## 2022-06-17 NOTE — RESPIRATORY THERAPY NOTE
06/17/22 0253   Respiratory Assessment   Respiratory Pattern Assisted   Chest Assessment Chest expansion symmetrical   Bilateral Breath Sounds Diminished;Clear   Cough None   Resp Comments Patient's PIPs trending upward, Pplat 30, lung compliance trending downward  May consider lung protective mode of ventilation     Vent Information   Vent type Wray C3   Wray C3/G5 Vent Mode (S)CMV   $ Vent Daily Charge-Subsequent Yes   $ Pulse Oximetry Spot Check Charge Completed   (S)CMV Settings   Resp Rate (BPM) 18 BPM   VT (mL) 490 mL   FIO2 (%) 40 %   PEEP (cmH2O) 8 cmH2O   Insp Time (%)   (0 8sec)   Flow Trigger (LPM) 3   Humidification Heater   Heater Temperature (Set) 95 °F (35 °C)   (S)CMV Actuals   Resp Rate (BPM) 18 BPM   VT (mL) 448   MV 8 1   MAP (cmH2O) 14 cmH2O   Peak Pressure (cmH2O) 37 cmH2O   Insp Resistance 10   Static Compliance (mL/cmH20) 23 mL/cmH2O   Plateau Pressure (cm H2O) 30 cm H2O   (S)CMV Alarms   High Peak Pressure (cmH2O) 40   Low Pressure (cmH2O) 5 cm H2O   High Resp Rate (BPM) 40 BPM   Low Resp Rate (BPM) 8 BPM   High MV (L/min) 22 L/min   Low MV (L/min) 4 L/min   Apnea Time (s) 20 S   Maintenance   Water bag changed No   Circuit changed No

## 2022-06-17 NOTE — OP NOTE
OPERATIVE REPORT  PATIENT NAME: Cici Benavides    :  1970  MRN: [id number]  Pt Location: BE OR ROOM 08    SURGERY DATE: 2022    Surgeon(s) and Role:     * Kelli Butterfield, DO - Primary     * Jose Mccurdy MD - Assisting    Preop Diagnosis:  Small bowel obstruction ([address] ) [M48 033]    Post-Op Diagnosis Codes:     * Small bowel obstruction ([address] ) [P19 449]    Procedure(s) (LRB):  ABDOMINAL WASHOUT, REPAIR OF SEROSAL TEARS,BRIDING VICRYL MESH, PARTIAL CLOSURE, APPLICATION OF WOUND VAC (N/A)    Specimen(s):  * No specimens in log *    Estimated Blood Loss:   Minimal    Drains:  NG/OG/Enteral Tube Right nare (Active)   Placement Reverification Auscultation 22 0800   Site Assessment Clean;Dry; Intact 22 0800   Status Suction-low continuous 22 08   Drainage Appearance Yellow; Thin 22 0800   Intake (mL) 30 mL 22 1000   Output (mL) 500 mL 22 1220   Number of days: 6       Urethral Catheter Latex 16 Fr  (Active)   Reasons to continue Urinary Catheter  Accurate I&O assessment in critically ill patients (48 hr  max) 22 1308   Goal for Removal Voiding trial when ambulation improves 22 1308   Site Assessment Clean;Skin intact 22 0800   Eastman Care Done 22 0800   Collection Container Standard drainage bag 22 0800   Securement Method Securing device (Describe) 22 0800   Output (mL) 45 mL 22 1220   Number of days: 3       Anesthesia Type:   General    Operative Indications:  Small bowel obstruction ([address] ) [K56 609]      Operative Findings:  Distended bowels, viable    Small serosal tears repaired with silk lembert    Vicryl Mesh bridge 12 x 12 inches    Wound VAC placed over 2/2 peak pressures increasing with skin closure   - 6x White sponge, 1x Black sponge    Complications:   None    Procedure and Technique:  The patient was brought to the operative suite and identified by wrist band   He was transferred to the OR table where anesthesia was taken over by the anesthesiologist      His abdomen was then prepped and drapped with Betadine  His abthera VAC was removed with 1 blue sponge and 1 blue octopus removed  The bowel was noted to be edematous however viable  We ran the bowel from LoT to TI and noted a few small bowel serosal tears which were lembert repaired  The DPR drain was removed  We then washed the abdomen out with sterile saline and made small skin flaps above the fascia and hernia sac to approximate closure with a 12 x 12 Vicryl Mesh  This was sewn in with 2-0 PDS suture circumferrentially  The fascia was not completely intact and hernia sac was also used for closure  We then attempted to close skin however only 2x vertical mattress sutures were able to be placed both superiorly and inferiorly 2/2 increased peak pressures thus we decided to place a wound VAC over the vicryl closure  6x White Sponges and 1x Black sponge was placed and the patients peak pressures remained stable  At the end of the case the instrument count was correct x2 and RF wand showed no retained sponges  The patient was then transferred off the bed and to MICU per anesthesia  Dr Abad Gaytan was present for the entire procedure        Patient Disposition:  Critical Care Unit      SIGNATURE: Jesus Yan MD  DATE: June 17, 2022  TIME: 4:04 PM

## 2022-06-18 ENCOUNTER — APPOINTMENT (INPATIENT)
Dept: RADIOLOGY | Facility: HOSPITAL | Age: 52
DRG: 230 | End: 2022-06-18
Payer: COMMERCIAL

## 2022-06-18 LAB
ALBUMIN SERPL BCP-MCNC: 2.3 G/DL (ref 3.5–5)
ALP SERPL-CCNC: 61 U/L (ref 46–116)
ALT SERPL W P-5'-P-CCNC: 16 U/L (ref 12–78)
ANION GAP SERPL CALCULATED.3IONS-SCNC: 3 MMOL/L (ref 4–13)
ANION GAP SERPL CALCULATED.3IONS-SCNC: 4 MMOL/L (ref 4–13)
ANISOCYTOSIS BLD QL SMEAR: PRESENT
APTT PPP: 38 SECONDS (ref 23–37)
APTT PPP: 38 SECONDS (ref 23–37)
APTT PPP: 42 SECONDS (ref 23–37)
AST SERPL W P-5'-P-CCNC: 24 U/L (ref 5–45)
ATRIAL RATE: 115 BPM
BACTERIA UR QL AUTO: ABNORMAL /HPF
BASE EX.OXY STD BLDV CALC-SCNC: 72.8 % (ref 60–80)
BASE EXCESS BLDA CALC-SCNC: 0.8 MMOL/L
BASE EXCESS BLDV CALC-SCNC: 2.4 MMOL/L
BASOPHILS # BLD MANUAL: 0.26 THOUSAND/UL (ref 0–0.1)
BASOPHILS NFR MAR MANUAL: 2 % (ref 0–1)
BILIRUB SERPL-MCNC: 2.26 MG/DL (ref 0.2–1)
BILIRUB UR QL STRIP: ABNORMAL
BODY TEMPERATURE: 100 DEGREES FEHRENHEIT
BUN SERPL-MCNC: 11 MG/DL (ref 5–25)
BUN SERPL-MCNC: 9 MG/DL (ref 5–25)
CA-I BLD-SCNC: 1.08 MMOL/L (ref 1.12–1.32)
CA-I BLD-SCNC: 1.1 MMOL/L (ref 1.12–1.32)
CALCIUM ALBUM COR SERPL-MCNC: 9.8 MG/DL (ref 8.3–10.1)
CALCIUM SERPL-MCNC: 8.3 MG/DL (ref 8.3–10.1)
CALCIUM SERPL-MCNC: 8.4 MG/DL (ref 8.3–10.1)
CARDIAC TROPONIN I PNL SERPL HS: 4 NG/L
CHLORIDE SERPL-SCNC: 102 MMOL/L (ref 100–108)
CHLORIDE SERPL-SCNC: 102 MMOL/L (ref 100–108)
CLARITY UR: CLEAR
CO2 SERPL-SCNC: 29 MMOL/L (ref 21–32)
CO2 SERPL-SCNC: 29 MMOL/L (ref 21–32)
COLOR UR: ABNORMAL
CREAT SERPL-MCNC: 0.64 MG/DL (ref 0.6–1.3)
CREAT SERPL-MCNC: 0.74 MG/DL (ref 0.6–1.3)
DEPRECATED AT III PPP: 23 % OF NORMAL (ref 92–136)
EOSINOPHIL # BLD MANUAL: 0 THOUSAND/UL (ref 0–0.4)
EOSINOPHIL NFR BLD MANUAL: 0 % (ref 0–6)
ERYTHROCYTE [DISTWIDTH] IN BLOOD BY AUTOMATED COUNT: 14.7 % (ref 11.6–15.1)
ERYTHROCYTE [DISTWIDTH] IN BLOOD BY AUTOMATED COUNT: 14.8 % (ref 11.6–15.1)
GFR SERPL CREATININE-BSD FRML MDRD: 106 ML/MIN/1.73SQ M
GFR SERPL CREATININE-BSD FRML MDRD: 113 ML/MIN/1.73SQ M
GLUCOSE SERPL-MCNC: 184 MG/DL (ref 65–140)
GLUCOSE SERPL-MCNC: 191 MG/DL (ref 65–140)
GLUCOSE SERPL-MCNC: 225 MG/DL (ref 65–140)
GLUCOSE UR STRIP-MCNC: ABNORMAL MG/DL
HCO3 BLDA-SCNC: 27.4 MMOL/L (ref 22–28)
HCO3 BLDV-SCNC: 29.8 MMOL/L (ref 24–30)
HCT VFR BLD AUTO: 30 % (ref 36.5–49.3)
HCT VFR BLD AUTO: 30.8 % (ref 36.5–49.3)
HEPARIN CF II AG PPP IA-MCNC: <0.1 IU/ML
HGB BLD-MCNC: 9.4 G/DL (ref 12–17)
HGB BLD-MCNC: 9.6 G/DL (ref 12–17)
HGB UR QL STRIP.AUTO: NEGATIVE
HOROWITZ INDEX BLDA+IHG-RTO: 60 MM[HG]
HOROWITZ INDEX BLDA+IHG-RTO: 60 MM[HG]
KETONES UR STRIP-MCNC: NEGATIVE MG/DL
LACTATE SERPL-SCNC: 2 MMOL/L (ref 0.5–2)
LEUKOCYTE ESTERASE UR QL STRIP: NEGATIVE
LYMPHOCYTES # BLD AUTO: 0.53 THOUSAND/UL (ref 0.6–4.47)
LYMPHOCYTES # BLD AUTO: 4 % (ref 14–44)
MAGNESIUM SERPL-MCNC: 2.5 MG/DL (ref 1.6–2.6)
MCH RBC QN AUTO: 29.7 PG (ref 26.8–34.3)
MCH RBC QN AUTO: 30 PG (ref 26.8–34.3)
MCHC RBC AUTO-ENTMCNC: 31.2 G/DL (ref 31.4–37.4)
MCHC RBC AUTO-ENTMCNC: 31.3 G/DL (ref 31.4–37.4)
MCV RBC AUTO: 95 FL (ref 82–98)
MCV RBC AUTO: 96 FL (ref 82–98)
MONOCYTES # BLD AUTO: 0.79 THOUSAND/UL (ref 0–1.22)
MONOCYTES NFR BLD: 6 % (ref 4–12)
MUCOUS THREADS UR QL AUTO: ABNORMAL
NEUTROPHILS # BLD MANUAL: 11.62 THOUSAND/UL (ref 1.85–7.62)
NEUTS BAND NFR BLD MANUAL: 4 % (ref 0–8)
NEUTS SEG NFR BLD AUTO: 84 % (ref 43–75)
NITRITE UR QL STRIP: NEGATIVE
NON-SQ EPI CELLS URNS QL MICRO: ABNORMAL /HPF
O2 CT BLDA-SCNC: 10.3 ML/DL (ref 16–23)
O2 CT BLDV-SCNC: 10.8 ML/DL
OXYHGB MFR BLDA: 71 % (ref 94–97)
PCO2 BLD: 63.4 MM HG (ref 42–50)
PCO2 BLDA: 54.1 MM HG (ref 36–44)
PCO2 BLDV: 61.2 MM HG (ref 42–50)
PEEP RESPIRATORY: 8 CM[H2O]
PEEP RESPIRATORY: 8 CM[H2O]
PH BLD: 7.29 [PH] (ref 7.3–7.4)
PH BLDA: 7.32 [PH] (ref 7.35–7.45)
PH BLDV: 7.3 [PH] (ref 7.3–7.4)
PH UR STRIP.AUTO: 6 [PH]
PHOSPHATE SERPL-MCNC: 2.7 MG/DL (ref 2.7–4.5)
PLATELET # BLD AUTO: 208 THOUSANDS/UL (ref 149–390)
PLATELET # BLD AUTO: 238 THOUSANDS/UL (ref 149–390)
PLATELET BLD QL SMEAR: ADEQUATE
PMV BLD AUTO: 10.2 FL (ref 8.9–12.7)
PMV BLD AUTO: 10.4 FL (ref 8.9–12.7)
PO2 BLDA: 38.7 MM HG (ref 75–129)
PO2 BLDV: 41.3 MM HG (ref 35–45)
PO2 VENOUS TEMP CORRECTED: 43.7 MM HG (ref 35–45)
POIKILOCYTOSIS BLD QL SMEAR: PRESENT
POLYCHROMASIA BLD QL SMEAR: PRESENT
POTASSIUM SERPL-SCNC: 4.5 MMOL/L (ref 3.5–5.3)
POTASSIUM SERPL-SCNC: 4.5 MMOL/L (ref 3.5–5.3)
PR INTERVAL: 0 MS
PROT SERPL-MCNC: 5.8 G/DL (ref 6.4–8.2)
PROT UR STRIP-MCNC: ABNORMAL MG/DL
QRS AXIS: 74 DEGREES
QRSD INTERVAL: 88 MS
QT INTERVAL: 292 MS
QTC INTERVAL: 404 MS
RBC # BLD AUTO: 3.13 MILLION/UL (ref 3.88–5.62)
RBC # BLD AUTO: 3.23 MILLION/UL (ref 3.88–5.62)
RBC #/AREA URNS AUTO: ABNORMAL /HPF
RBC MORPH BLD: PRESENT
RENAL EPI CELLS #/AREA URNS HPF: PRESENT /[HPF]
SODIUM SERPL-SCNC: 134 MMOL/L (ref 136–145)
SODIUM SERPL-SCNC: 135 MMOL/L (ref 136–145)
SP GR UR STRIP.AUTO: 1.03 (ref 1–1.03)
SPECIMEN SOURCE: ABNORMAL
T WAVE AXIS: 252 DEGREES
TRANS CELLS #/AREA URNS HPF: PRESENT /[HPF]
UROBILINOGEN UR STRIP-ACNC: <2 MG/DL
VENT- AC: AC
VENT- AC: AC
VENTRICULAR RATE: 115 BPM
WBC # BLD AUTO: 13.21 THOUSAND/UL (ref 4.31–10.16)
WBC # BLD AUTO: 13.86 THOUSAND/UL (ref 4.31–10.16)
WBC #/AREA URNS AUTO: ABNORMAL /HPF

## 2022-06-18 PROCEDURE — 80053 COMPREHEN METABOLIC PANEL: CPT | Performed by: SURGERY

## 2022-06-18 PROCEDURE — 82805 BLOOD GASES W/O2 SATURATION: CPT | Performed by: SURGERY

## 2022-06-18 PROCEDURE — 85520 HEPARIN ASSAY: CPT | Performed by: PHYSICIAN ASSISTANT

## 2022-06-18 PROCEDURE — 87070 CULTURE OTHR SPECIMN AEROBIC: CPT | Performed by: PHYSICIAN ASSISTANT

## 2022-06-18 PROCEDURE — 94760 N-INVAS EAR/PLS OXIMETRY 1: CPT

## 2022-06-18 PROCEDURE — 87205 SMEAR GRAM STAIN: CPT | Performed by: PHYSICIAN ASSISTANT

## 2022-06-18 PROCEDURE — 85730 THROMBOPLASTIN TIME PARTIAL: CPT | Performed by: STUDENT IN AN ORGANIZED HEALTH CARE EDUCATION/TRAINING PROGRAM

## 2022-06-18 PROCEDURE — 82948 REAGENT STRIP/BLOOD GLUCOSE: CPT

## 2022-06-18 PROCEDURE — 94003 VENT MGMT INPAT SUBQ DAY: CPT

## 2022-06-18 PROCEDURE — 82330 ASSAY OF CALCIUM: CPT | Performed by: SURGERY

## 2022-06-18 PROCEDURE — 93010 ELECTROCARDIOGRAM REPORT: CPT | Performed by: INTERNAL MEDICINE

## 2022-06-18 PROCEDURE — 94640 AIRWAY INHALATION TREATMENT: CPT

## 2022-06-18 PROCEDURE — 85027 COMPLETE CBC AUTOMATED: CPT | Performed by: SURGERY

## 2022-06-18 PROCEDURE — C9113 INJ PANTOPRAZOLE SODIUM, VIA: HCPCS | Performed by: SURGERY

## 2022-06-18 PROCEDURE — 99291 CRITICAL CARE FIRST HOUR: CPT | Performed by: STUDENT IN AN ORGANIZED HEALTH CARE EDUCATION/TRAINING PROGRAM

## 2022-06-18 PROCEDURE — 85300 ANTITHROMBIN III ACTIVITY: CPT | Performed by: PHYSICIAN ASSISTANT

## 2022-06-18 PROCEDURE — 71045 X-RAY EXAM CHEST 1 VIEW: CPT

## 2022-06-18 PROCEDURE — 87186 SC STD MICRODIL/AGAR DIL: CPT | Performed by: PHYSICIAN ASSISTANT

## 2022-06-18 PROCEDURE — 3E0336Z INTRODUCTION OF NUTRITIONAL SUBSTANCE INTO PERIPHERAL VEIN, PERCUTANEOUS APPROACH: ICD-10-PCS | Performed by: STUDENT IN AN ORGANIZED HEALTH CARE EDUCATION/TRAINING PROGRAM

## 2022-06-18 PROCEDURE — 83735 ASSAY OF MAGNESIUM: CPT | Performed by: SURGERY

## 2022-06-18 PROCEDURE — 85730 THROMBOPLASTIN TIME PARTIAL: CPT | Performed by: SURGERY

## 2022-06-18 PROCEDURE — 87040 BLOOD CULTURE FOR BACTERIA: CPT | Performed by: PHYSICIAN ASSISTANT

## 2022-06-18 PROCEDURE — NC001 PR NO CHARGE: Performed by: SURGERY

## 2022-06-18 PROCEDURE — 84100 ASSAY OF PHOSPHORUS: CPT | Performed by: SURGERY

## 2022-06-18 PROCEDURE — 81001 URINALYSIS AUTO W/SCOPE: CPT | Performed by: PHYSICIAN ASSISTANT

## 2022-06-18 RX ORDER — HEPARIN SODIUM 1000 [USP'U]/ML
5000 INJECTION, SOLUTION INTRAVENOUS; SUBCUTANEOUS
Status: DISCONTINUED | OUTPATIENT
Start: 2022-06-18 | End: 2022-07-18

## 2022-06-18 RX ORDER — HYDROMORPHONE HCL/PF 1 MG/ML
1 SYRINGE (ML) INJECTION EVERY 4 HOURS PRN
Status: DISCONTINUED | OUTPATIENT
Start: 2022-06-18 | End: 2022-06-28

## 2022-06-18 RX ORDER — HEPARIN SODIUM 10000 [USP'U]/100ML
3-30 INJECTION, SOLUTION INTRAVENOUS
Status: DISCONTINUED | OUTPATIENT
Start: 2022-06-18 | End: 2022-06-18

## 2022-06-18 RX ORDER — DIGOXIN 0.25 MG/ML
250 INJECTION INTRAMUSCULAR; INTRAVENOUS EVERY 6 HOURS SCHEDULED
Status: COMPLETED | OUTPATIENT
Start: 2022-06-18 | End: 2022-06-19

## 2022-06-18 RX ORDER — HYDROMORPHONE HCL IN WATER/PF 6 MG/30 ML
0.2 PATIENT CONTROLLED ANALGESIA SYRINGE INTRAVENOUS EVERY 2 HOUR PRN
Status: DISCONTINUED | OUTPATIENT
Start: 2022-06-18 | End: 2022-06-19

## 2022-06-18 RX ORDER — BUDESONIDE 0.5 MG/2ML
0.5 INHALANT ORAL
Status: DISCONTINUED | OUTPATIENT
Start: 2022-06-18 | End: 2022-06-21

## 2022-06-18 RX ORDER — NOREPINEPHRINE BITARTRATE 1 MG/ML
INJECTION, SOLUTION INTRAVENOUS
Status: COMPLETED
Start: 2022-06-18 | End: 2022-06-18

## 2022-06-18 RX ORDER — HEPARIN SODIUM 1000 [USP'U]/ML
10000 INJECTION, SOLUTION INTRAVENOUS; SUBCUTANEOUS
Status: DISCONTINUED | OUTPATIENT
Start: 2022-06-18 | End: 2022-07-18

## 2022-06-18 RX ORDER — FUROSEMIDE 10 MG/ML
40 INJECTION INTRAMUSCULAR; INTRAVENOUS ONCE
Status: COMPLETED | OUTPATIENT
Start: 2022-06-18 | End: 2022-06-18

## 2022-06-18 RX ORDER — HYDROMORPHONE HCL/PF 1 MG/ML
0.5 SYRINGE (ML) INJECTION EVERY 2 HOUR PRN
Status: DISCONTINUED | OUTPATIENT
Start: 2022-06-18 | End: 2022-06-19

## 2022-06-18 RX ORDER — METRONIDAZOLE 500 MG/100ML
500 INJECTION, SOLUTION INTRAVENOUS EVERY 8 HOURS
Status: DISCONTINUED | OUTPATIENT
Start: 2022-06-18 | End: 2022-06-20

## 2022-06-18 RX ORDER — HEPARIN SODIUM 1000 [USP'U]/ML
10000 INJECTION, SOLUTION INTRAVENOUS; SUBCUTANEOUS ONCE
Status: COMPLETED | OUTPATIENT
Start: 2022-06-18 | End: 2022-06-18

## 2022-06-18 RX ORDER — CALCIUM GLUCONATE 20 MG/ML
1 INJECTION, SOLUTION INTRAVENOUS ONCE
Status: COMPLETED | OUTPATIENT
Start: 2022-06-18 | End: 2022-06-18

## 2022-06-18 RX ORDER — DIGOXIN 0.25 MG/ML
250 INJECTION INTRAMUSCULAR; INTRAVENOUS ONCE
Status: COMPLETED | OUTPATIENT
Start: 2022-06-18 | End: 2022-06-18

## 2022-06-18 RX ORDER — HEPARIN SODIUM 10000 [USP'U]/100ML
3-30 INJECTION, SOLUTION INTRAVENOUS
Status: DISCONTINUED | OUTPATIENT
Start: 2022-06-18 | End: 2022-07-18

## 2022-06-18 RX ORDER — DEXMEDETOMIDINE HYDROCHLORIDE 4 UG/ML
.1-.7 INJECTION, SOLUTION INTRAVENOUS
Status: DISCONTINUED | OUTPATIENT
Start: 2022-06-18 | End: 2022-06-20

## 2022-06-18 RX ORDER — FUROSEMIDE 10 MG/ML
20 INJECTION INTRAMUSCULAR; INTRAVENOUS ONCE
Status: COMPLETED | OUTPATIENT
Start: 2022-06-18 | End: 2022-06-18

## 2022-06-18 RX ORDER — CALCIUM GLUCONATE 20 MG/ML
2 INJECTION, SOLUTION INTRAVENOUS ONCE
Status: COMPLETED | OUTPATIENT
Start: 2022-06-18 | End: 2022-06-18

## 2022-06-18 RX ADMIN — NOREPINEPHRINE BITARTRATE 8 MCG/MIN: 1 INJECTION, SOLUTION, CONCENTRATE INTRAVENOUS at 17:57

## 2022-06-18 RX ADMIN — HEPARIN SODIUM 10000 UNITS: 1000 INJECTION INTRAVENOUS; SUBCUTANEOUS at 18:03

## 2022-06-18 RX ADMIN — DIGOXIN 250 MCG: 0.25 INJECTION INTRAMUSCULAR; INTRAVENOUS at 16:15

## 2022-06-18 RX ADMIN — SODIUM PHOSPHATE, MONOBASIC, MONOHYDRATE 21 MMOL: 276; 142 INJECTION, SOLUTION INTRAVENOUS at 15:30

## 2022-06-18 RX ADMIN — METRONIDAZOLE 500 MG: 500 INJECTION, SOLUTION INTRAVENOUS at 13:47

## 2022-06-18 RX ADMIN — AMIODARONE HYDROCHLORIDE 0.5 MG/MIN: 50 INJECTION, SOLUTION INTRAVENOUS at 07:42

## 2022-06-18 RX ADMIN — IPRATROPIUM BROMIDE 0.5 MG: 0.5 SOLUTION RESPIRATORY (INHALATION) at 07:14

## 2022-06-18 RX ADMIN — BUDESONIDE 0.5 MG: 0.5 INHALANT ORAL at 20:09

## 2022-06-18 RX ADMIN — PANTOPRAZOLE SODIUM 40 MG: 40 INJECTION, POWDER, FOR SOLUTION INTRAVENOUS at 11:02

## 2022-06-18 RX ADMIN — HEPARIN SODIUM 22 UNITS/KG/HR: 10000 INJECTION, SOLUTION INTRAVENOUS at 10:43

## 2022-06-18 RX ADMIN — IPRATROPIUM BROMIDE 0.5 MG: 0.5 SOLUTION RESPIRATORY (INHALATION) at 20:09

## 2022-06-18 RX ADMIN — DIGOXIN 250 MCG: 0.25 INJECTION INTRAMUSCULAR; INTRAVENOUS at 22:12

## 2022-06-18 RX ADMIN — AMIODARONE HYDROCHLORIDE 150 MG: 50 INJECTION, SOLUTION INTRAVENOUS at 11:00

## 2022-06-18 RX ADMIN — INSULIN LISPRO 1 UNITS: 100 INJECTION, SOLUTION INTRAVENOUS; SUBCUTANEOUS at 06:13

## 2022-06-18 RX ADMIN — LEVALBUTEROL HYDROCHLORIDE 1.25 MG: 1.25 SOLUTION, CONCENTRATE RESPIRATORY (INHALATION) at 20:09

## 2022-06-18 RX ADMIN — CHLORHEXIDINE GLUCONATE 15 ML: 1.2 SOLUTION ORAL at 22:12

## 2022-06-18 RX ADMIN — INSULIN LISPRO 1 UNITS: 100 INJECTION, SOLUTION INTRAVENOUS; SUBCUTANEOUS at 18:03

## 2022-06-18 RX ADMIN — IPRATROPIUM BROMIDE 0.5 MG: 0.5 SOLUTION RESPIRATORY (INHALATION) at 13:27

## 2022-06-18 RX ADMIN — HYDROMORPHONE HYDROCHLORIDE 0.5 MG: 1 INJECTION, SOLUTION INTRAMUSCULAR; INTRAVENOUS; SUBCUTANEOUS at 10:48

## 2022-06-18 RX ADMIN — ACETYLCYSTEINE 600 MG: 200 SOLUTION ORAL; RESPIRATORY (INHALATION) at 07:14

## 2022-06-18 RX ADMIN — FUROSEMIDE 20 MG: 10 INJECTION, SOLUTION INTRAMUSCULAR; INTRAVENOUS at 07:54

## 2022-06-18 RX ADMIN — VASOPRESSIN 0.04 UNITS/MIN: 20 INJECTION INTRAVENOUS at 17:57

## 2022-06-18 RX ADMIN — HEPARIN SODIUM 25.1 UNITS/KG/HR: 10000 INJECTION, SOLUTION INTRAVENOUS at 05:46

## 2022-06-18 RX ADMIN — Medication 150 MCG/HR: at 07:49

## 2022-06-18 RX ADMIN — CEFEPIME HYDROCHLORIDE 2000 MG: 2 INJECTION, POWDER, FOR SOLUTION INTRAVENOUS at 13:01

## 2022-06-18 RX ADMIN — CEFEPIME HYDROCHLORIDE 2000 MG: 2 INJECTION, POWDER, FOR SOLUTION INTRAVENOUS at 18:23

## 2022-06-18 RX ADMIN — ACETYLCYSTEINE 600 MG: 200 SOLUTION ORAL; RESPIRATORY (INHALATION) at 13:27

## 2022-06-18 RX ADMIN — LEVOTHYROXINE SODIUM 25 MCG: 25 TABLET ORAL at 05:47

## 2022-06-18 RX ADMIN — OXYCODONE HYDROCHLORIDE 10 MG: 5 SOLUTION ORAL at 03:51

## 2022-06-18 RX ADMIN — NOREPINEPHRINE BITARTRATE: 1 INJECTION, SOLUTION, CONCENTRATE INTRAVENOUS at 05:46

## 2022-06-18 RX ADMIN — HEPARIN SODIUM 4000 UNITS: 1000 INJECTION INTRAVENOUS; SUBCUTANEOUS at 07:52

## 2022-06-18 RX ADMIN — HYDROCORTISONE SODIUM SUCCINATE 50 MG: 100 INJECTION, POWDER, FOR SOLUTION INTRAMUSCULAR; INTRAVENOUS at 11:00

## 2022-06-18 RX ADMIN — CHLORHEXIDINE GLUCONATE 15 ML: 1.2 SOLUTION ORAL at 10:52

## 2022-06-18 RX ADMIN — ACETYLCYSTEINE 600 MG: 200 SOLUTION ORAL; RESPIRATORY (INHALATION) at 20:09

## 2022-06-18 RX ADMIN — PROPOFOL 15 MCG/KG/MIN: 10 INJECTION, EMULSION INTRAVENOUS at 07:48

## 2022-06-18 RX ADMIN — INSULIN LISPRO 2 UNITS: 100 INJECTION, SOLUTION INTRAVENOUS; SUBCUTANEOUS at 14:48

## 2022-06-18 RX ADMIN — LEVALBUTEROL HYDROCHLORIDE 1.25 MG: 1.25 SOLUTION, CONCENTRATE RESPIRATORY (INHALATION) at 13:27

## 2022-06-18 RX ADMIN — HYDROCORTISONE SODIUM SUCCINATE 100 MG: 100 INJECTION, POWDER, FOR SOLUTION INTRAMUSCULAR; INTRAVENOUS at 07:54

## 2022-06-18 RX ADMIN — HYDROMORPHONE HYDROCHLORIDE 0.5 MG: 1 INJECTION, SOLUTION INTRAMUSCULAR; INTRAVENOUS; SUBCUTANEOUS at 22:19

## 2022-06-18 RX ADMIN — Medication 150 MCG/HR: at 00:00

## 2022-06-18 RX ADMIN — VASOPRESSIN 0.04 UNITS/MIN: 20 INJECTION INTRAVENOUS at 08:16

## 2022-06-18 RX ADMIN — AMIODARONE HYDROCHLORIDE 150 MG: 50 INJECTION, SOLUTION INTRAVENOUS at 08:15

## 2022-06-18 RX ADMIN — HYDROMORPHONE HYDROCHLORIDE 1 MG: 1 INJECTION, SOLUTION INTRAMUSCULAR; INTRAVENOUS; SUBCUTANEOUS at 19:50

## 2022-06-18 RX ADMIN — NOREPINEPHRINE BITARTRATE 20 MCG/MIN: 1 INJECTION, SOLUTION, CONCENTRATE INTRAVENOUS at 07:48

## 2022-06-18 RX ADMIN — PROPOFOL 15 MCG/KG/MIN: 10 INJECTION, EMULSION INTRAVENOUS at 03:51

## 2022-06-18 RX ADMIN — HEPARIN SODIUM 6000 UNITS: 1000 INJECTION INTRAVENOUS; SUBCUTANEOUS at 10:53

## 2022-06-18 RX ADMIN — AMIODARONE HYDROCHLORIDE 150 MG: 50 INJECTION, SOLUTION INTRAVENOUS at 14:00

## 2022-06-18 RX ADMIN — FUROSEMIDE 40 MG: 10 INJECTION, SOLUTION INTRAMUSCULAR; INTRAVENOUS at 18:23

## 2022-06-18 RX ADMIN — INSULIN LISPRO 1 UNITS: 100 INJECTION, SOLUTION INTRAVENOUS; SUBCUTANEOUS at 00:35

## 2022-06-18 RX ADMIN — HYDROMORPHONE HYDROCHLORIDE 1 MG: 1 INJECTION, SOLUTION INTRAMUSCULAR; INTRAVENOUS; SUBCUTANEOUS at 16:15

## 2022-06-18 RX ADMIN — Medication 100 MCG/HR: at 18:16

## 2022-06-18 RX ADMIN — BUDESONIDE 0.5 MG: 0.5 INHALANT ORAL at 11:48

## 2022-06-18 RX ADMIN — LEVALBUTEROL HYDROCHLORIDE 1.25 MG: 1.25 SOLUTION, CONCENTRATE RESPIRATORY (INHALATION) at 07:13

## 2022-06-18 RX ADMIN — CALCIUM GLUCONATE 2 G: 20 INJECTION, SOLUTION INTRAVENOUS at 11:26

## 2022-06-18 RX ADMIN — METRONIDAZOLE 500 MG: 500 INJECTION, SOLUTION INTRAVENOUS at 17:54

## 2022-06-18 RX ADMIN — CALCIUM GLUCONATE 1 G: 20 INJECTION, SOLUTION INTRAVENOUS at 03:51

## 2022-06-18 RX ADMIN — HYDROCORTISONE SODIUM SUCCINATE 50 MG: 100 INJECTION, POWDER, FOR SOLUTION INTRAMUSCULAR; INTRAVENOUS at 17:54

## 2022-06-18 NOTE — PROGRESS NOTES
Progress Note - Critical Care   Cathi Garza 47 y o  male MRN: [id number]  [facility] En[facility]: [id number]    SUBJECTIVE:  Unable to assess patient intubated     HPI/24HR Event:  Overnight patient's levo requirement went up   Bedside US unable to ascertain cause of persistent hypotension   500 cc bolus fluid administered due to hypotension and low UOP with minimal response   Metoprolol was held overnight due to hypotension and patient with RVR this morning so amio bolus given     ROS  Unable to obtain due to patient being intubated       Assessment & Plan:   -Neuro:    - Analgesia/sedation   - Drip    - Fentanyl 150    - Propofol 15   - prn     - Fentanyl   Dx: Depression    - Resume home wellbutrin when able   - Delirium ppx: CAM-ICU, sleep hygiene      CV:   - Dx: Hypotension     - Pressor requirements increasing   - Attempt a line for better blood pressure monitoring    - Hemodynamic support:     - Levophed 21    - Vasopressin 0 4   - Stress dose steroids started  - Dx: Afib with RVR   - Metoprolol 10mg q6   - consider holding metoprolol administration   - Amio bolus given   - Amiodarone gtt   - Heparin gtt  - Dx: Hx of CHF    - Home meds:  Torsemide 40 BID and aldactone 50 daily    - Patient currently taking 50 Aldactone     - Aldactone held this morning in the setting of hypotension   - MAP goal > 65        Lung:   - Dx: Acute respiratory failure    - Currently on full vent support     - AC/VC 22/490/60%/8    - SBT daily   - Dx: Hx of COPD    - Ipratropium and levalbuterol TID   - Dx: Hx of obesity hypoventilation syndrome    - Will evaluate once extubated   - Continue Respiratory Protocol and Airway Clearance Protocol      GI:   - Dx: SBO    - s/p ex lap HARVEY with abthera placement on 6/14   - 2nd look ex lap with initiation of DPR 6/15   - 3rd look repair of serosal tear, bridging mesh and partial closure    - No Abthera placed  - Stress ulcer ppx: Protonix  - Bowel regimen: None       FEN:   - Fluids: no maintenance   - Electrolytes: trend and replete as needed  - Nutrition: TPN started      :   - Dx: Low urine output    - 870 mL in 24 hour period    - Spironolactone held this morning     - Consider lasix for diuresis   - I/O last 24 hours: In: 5873 7 [I V :3693 7; NG/GT:880; IV Piggyback:1300]  Out: 2870 [Urine:870; Emesis/NG output:500; Drains:1500]  - Eastman in place     ID:   - Dx: No active issues    - Patient remained afebrile overnight  - Monitor WBC/temp curve    Heme:   - Dx: Anemia   - Hb has stabilized     - Continue to monitor daily    - Transfuse if < 7  - Dx: Hx of DVT    - Home meds: pradaxa   - IVC filter still in place 2/2 inability to retrive    - Heparin gtt  - DVT ppx: Heparin gtt, SCDs     Endo:   - Dx: Hx of hypothyroidisim    - Levothyroxine  - Dx: Hyperglycemia   - ISS   - Goal -180    Msk/Skin:   - Dx: Pressure ulcer    - Continue wound care   - PT/OT  - Turning/repositioning  - Wound care     Disposition: Continue ICU care     Invasive lines and devices: Invasive Devices  Report    Peripherally Inserted Central Catheter Line  Duration           PICC Line  Right Basilic 4 days          Central Venous Catheter Line  Duration           CVC Central Lines 22 Triple 3 days          Arterial Line  Duration           Arterial Line 22 Left Radial <1 day          Drain  Duration           NG/OG/Enteral Tube Right nare 6 days    Urethral Catheter Latex 16 Fr  3 days          Airway  Duration           ETT  Hi-Lo; Cuffed;Oral 8 mm 3 days                   Physical exam  General: Resting comfortably in bed in no acute distress  Neuro: GCS 8T (Eye 1, Verbal 1, Motor 6)  HENT:  Normocephalic and atraumatic, PERRL  Heart:  RRR, pulses intact  Lungs:  Bilateral breath sounds present  Abdomen:   Bowel sounds present, soft  Skin:  Warm, dry skin/Incision site with dressing applied that is clean dry and intact    Vitals  Temperature:   Temp (24hrs), Av 6 °F (37 6 °C), Min:98 24 °F (36 8 °C), Max:100 4 °F (38 °C)    Current: Temperature: 100 °F (37 8 °C)    Vitals:    06/18/22 0400 06/18/22 0500 06/18/22 0530 06/18/22 0600   BP: 97/59 119/76 114/67 125/69   Pulse: (!) 118 (!) 126 (!) 122 (!) 120   Resp:       Temp: 99 7 °F (37 6 °C) 100 °F (37 8 °C) 100 °F (37 8 °C) 100 °F (37 8 °C)   TempSrc:       SpO2: 97% 96% 97% 97%   Weight:         Arterial Line BP: 70/58  Arterial Line MAP (mmHg): (!) 64 mmHg    Non-Invasive/Invasive Ventilation Settings:  Respiratory  Report   Lab Data (Last 4 hours)      06/18 0612            pH, Arterial       7 323             pCO2, Arterial       54 1             pO2, Arterial       38 7             HCO3, Arterial       27 4             Base Excess, Arterial       0 8                  O2/Vent Data           Most Recent        Patient safety screen outcome:   Failed                Lab Results   Component Value Date    PHART 7 323 (L) 06/18/2022    NCQ7CQU 54 1 (H) 06/18/2022    PO2ART 38 7 (LL) 06/18/2022    SNF0FCM 27 4 06/18/2022    BEART 0 8 06/18/2022    SOURCE Line, Venous 06/18/2022     SpO2: SpO2: 97 %    Intake and Outputs:  I/O       06/16 0701  06/17 0700 06/17 0701  06/18 0700    I V  (mL/kg) 2723 1 (11 6) 2837 2 (12 1)    NG/GT 3055 830    IV Piggyback  1300    Total Intake(mL/kg) 5778 1 (24 7) 4967 2 (21 2)    Urine (mL/kg/hr) 700 (0 1) 695 (0 1)    Emesis/NG output 550 500    Drains 3925 1500    Total Output 5175 2695    Net +603 1 +2272 2                Labs:   Results from last 7 days   Lab Units 06/18/22  0424 06/17/22  2323 06/17/22  1307 06/17/22  0458 06/16/22  0406 06/15/22  1409   WBC Thousand/uL 13 86* 13 21*  --  14 58* 11 53* 10 59*   HEMOGLOBIN g/dL 9 6* 9 4*  --  10 2* 10 7* 12 1   I STAT HEMOGLOBIN g/dl  --   --  10 2*  --   --   --    HEMATOCRIT % 30 8* 30 0*  --  31 9* 33 4* 37 9   HEMATOCRIT, ISTAT %  --   --  30*  --   --   --    PLATELETS Thousands/uL 208 238  --  216 197 171   NEUTROS PCT %  --   --   --  83* 79* 83* MONOS PCT %  --   --   --  6 10 8   MONO PCT %  --  6  --   --   --   --       Results from last 7 days   Lab Units 06/18/22  0424 06/17/22  2323 06/17/22  1724 06/17/22  1307 06/15/22  0650 06/15/22  0449 06/14/22  1847   SODIUM mmol/L 134* 135* 135*  --    < > 136 135*   POTASSIUM mmol/L 4 5 4 5 4 6  --    < > 5 2 4 8   CHLORIDE mmol/L 102 102 102  --    < > 103 104   CO2 mmol/L 29 29 31  --    < > 32 26   CO2, I-STAT mmol/L  --   --   --  35*  --   --   --    BUN mg/dL 9 11 12  --    < > 6 6   CREATININE mg/dL 0 64 0 74 0 75  --    < > 0 65 0 62   CALCIUM mg/dL 8 4 8 3 8 4  --    < > 8 7 9 0   ALK PHOS U/L 61  --   --   --   --  63 65   ALT U/L 16  --   --   --   --  29 32   AST U/L 24  --   --   --   --  15 19   GLUCOSE, ISTAT mg/dl  --   --   --  137  --   --   --     < > = values in this interval not displayed  Results from last 7 days   Lab Units 06/18/22 0424 06/17/22 1724 06/17/22  0458   MAGNESIUM mg/dL 2 5 2 6 2 7*     Results from last 7 days   Lab Units 06/18/22 0424 06/17/22  1724 06/17/22  0458   PHOSPHORUS mg/dL 2 7 4 0 3 7      Results from last 7 days   Lab Units 06/18/22  0612 06/17/22  2323 06/17/22  0130 06/15/22  2112 06/15/22  0449   INR   --   --   --   --  1 22*   PTT seconds 38* 38* 39*   < > 32    < > = values in this interval not displayed  Results from last 7 days   Lab Units 06/17/22 2323   LACTIC ACID mmol/L 2 0       Micro:  Lab Results   Component Value Date    BLOODCX No Growth After 5 Days  11/26/2021    BLOODCX No Growth After 5 Days  11/26/2021    BLOODCX No Growth After 5 Days  07/04/2021    BLOODCX No Growth After 5 Days   07/04/2021    URINECX >100,000 cfu/ml Mixed Contaminants X3 09/13/2016    WOUNDCULT (A) 11/26/2021     2+ Growth of Methicillin Resistant Staphylococcus aureus    SPUTUMCULTUR 3+ Growth of Mixed Respiratory Cece 10/11/2016    SPUTUMCULTUR 4+ Growth of Mixed Respiratory Cece 09/29/2016    SPUTUMCULTUR 3+ Growth of Mixed Respiratory Cece 09/18/2016       Resulted Labs    Pending Labs      Imaging Studies    Imaging:   XR chest portable ICU   Final Result by Dangelo Easton MD (06/17 2011)      Questioned left basilar infiltrate  Cathlean Deshaun Heart shadow is enlarged and there is pulmonary vascular congestion concerning for CHF  Workstation performed: [id number]         XR chest portable ICU   Final Result by Jj Avitia MD (06/17 1043)      Pulmonary vascular congestion without a definitive consolidation or effusion  No pneumothorax  Workstation performed: [id number]           I have personally reviewed pertinent reports  Allergies:    Allergies   Allergen Reactions    Penicillins Hives         Medications:   Scheduled Meds:  Current Facility-Administered Medications   Medication Dose Route Frequency Provider Last Rate    acetylcysteine  3 mL Nebulization TID Friddie Scheuermann, MD      Adult TPN (STANDARD BASE/STANDARD ELECTROLYTE)   Intravenous Continuous TPN Mahad Monk MD 41 6 mL/hr at 06/17/22 2137    amiodarone  0 5 mg/min Intravenous Continuous Mahad Monk MD 0 5 mg/min (06/18/22 0742)    amiodarone (CORDARONE) IV bolus  150 mg Intravenous Once Selwyn Arnold PA-C      chlorhexidine  15 mL Mouth/Throat Q12H [address], MD      fentaNYL  150 mcg/hr Intravenous Continuous Mahad Monk  mcg/hr (06/18/22 0749)    fentanyl citrate (PF)  50 mcg Intravenous Q1H PRN Mahad Monk MD      heparin (porcine)  3-30 Units/kg/hr (Order-Specific) Intravenous Titrated Selwyn Arnold PA-C      heparin (porcine)  2,000 Units Intravenous Q1H PRN Mahad Monk MD      heparin (porcine)  4,000 Units Intravenous Q1H PRN Mahad Monk MD      hydrocortisone sodium succinate  100 mg Intravenous Once Selwyn Arnold PA-C      Followed by   [place] hydrocortisone sodium succinate  50 mg Intravenous Q6H [address] Selwyn Arnold PA-C      insulin lispro  1-5 Units Subcutaneous Q6H [address] Garrison Rodriguez MD      ipratropium  0 5 mg Nebulization TID Garrison Rodriguez MD      levalbuterol  1 25 mg Nebulization TID Garrison Rodriguez MD      levothyroxine  25 mcg Oral Early Morning Garrison Rodriguez MD      metoprolol  10 mg Intravenous Q6H Garrison Rodriguez MD      norepinephrine  1-30 mcg/min Intravenous Titrated Brandon , PA-C 20 mcg/min (06/18/22 0748)    oxyCODONE  10 mg Oral Q6H Garrison Rodriguez MD      pantoprazole  40 mg Intravenous Q24H [address] Richard Sharyle Cords, MD      propofol  5-50 mcg/kg/min Intravenous Titrated Garrison Rodriguez MD 15 mcg/kg/min (06/18/22 0748)    spironolactone  50 mg Oral Daily Garrison Rodriguez MD      vasopressin  0 04 Units/min Intravenous Continuous Brandon , PA-C 0 04 Units/min (06/17/22 2319)     Continuous Infusions:Adult TPN (STANDARD BASE/STANDARD ELECTROLYTE), , Last Rate: 41 6 mL/hr at 06/17/22 2137  amiodarone, 0 5 mg/min, Last Rate: 0 5 mg/min (06/18/22 0742)  fentaNYL, 150 mcg/hr, Last Rate: 150 mcg/hr (06/18/22 0749)  heparin (porcine), 3-30 Units/kg/hr (Order-Specific)  norepinephrine, 1-30 mcg/min, Last Rate: 20 mcg/min (06/18/22 0748)  propofol, 5-50 mcg/kg/min, Last Rate: 15 mcg/kg/min (06/18/22 0748)  vasopressin, 0 04 Units/min, Last Rate: 0 04 Units/min (06/17/22 2319)      PRN Meds:  fentanyl citrate (PF), 50 mcg, Q1H PRN  heparin (porcine), 2,000 Units, Q1H PRN  heparin (porcine), 4,000 Units, Q1H PRN        Counseling / Coordination of Care  Total Critical Care time spent 30 minutes excluding procedures, teaching and family updates  Code Status: Level 1 - Full Code     Portions of the record may have been created with voice recognition software  Occasional wrong word or "sound a like" substitutions may have occurred due to the inherent limitations of voice recognition software    Read the chart carefully and recognize, using context, where substitutions have occurred       Rosalino Hidalgo MD

## 2022-06-18 NOTE — RESPIRATORY THERAPY NOTE
RT Ventilator Management Note  Randell King 47 y o  male MRN: [id number]  [facility]: [id number]      Daily Screen         6/17/2022  1559 6/18/2022  0714          Patient safety screen outcome[de-identified] Failed Failed      Not Ready for Weaning due to[de-identified] Underline problem not resolved Poor inspiratory effort      Spont breathing trial outcome[de-identified] -- Failed      Spont breathing trial reason failed: -- Tidal volume < 4ml/Kg of ideal body weight or VE <5 or  >15 LPM      Previous settings resumed: -- Yes                Physical Exam:   Assessment Type: (P) Assess only  General Appearance: (P) Awake  Respiratory Pattern: (P) Assisted  Chest Assessment: (P) Chest expansion symmetrical  Bilateral Breath Sounds: (P) Coarse      Resp Comments: (P) pt continues on Spont settings through C3

## 2022-06-18 NOTE — PLAN OF CARE
Problem: Prexisting or High Potential for Compromised Skin Integrity  Goal: Skin integrity is maintained or improved  Description: INTERVENTIONS:  - Identify patients at risk for skin breakdown  - Assess and monitor skin integrity  - Assess and monitor nutrition and hydration status  - Monitor labs   - Assess for incontinence   - Turn and reposition patient  - Assist with mobility/ambulation  - Relieve pressure over bony prominences  - Avoid friction and shearing  - Provide appropriate hygiene as needed including keeping skin clean and dry  - Evaluate need for skin moisturizer/barrier cream  - Collaborate with interdisciplinary team   - Patient/family teaching  - Consider wound care consult   Outcome: Progressing     Problem: Potential for Falls  Goal: Patient will remain free of falls  Description: INTERVENTIONS:  - Educate patient/family on patient safety including physical limitations  - Instruct patient to call for assistance with activity   - Consult OT/PT to assist with strengthening/mobility   - Keep Call bell within reach  - Keep bed low and locked with side rails adjusted as appropriate  - Keep care items and personal belongings within reach  - Initiate and maintain comfort rounds  - Make Fall Risk Sign visible to staff  Problem: Nutrition/Hydration-ADULT  Goal: Nutrient/Hydration intake appropriate for improving, restoring or maintaining nutritional needs  Description: Monitor and assess patient's nutrition/hydration status for malnutrition  Collaborate with interdisciplinary team and initiate plan and interventions as ordered  Monitor patient's weight and dietary intake as ordered or per policy  Utilize nutrition screening tool and intervene as necessary  Determine patient's food preferences and provide high-protein, high-caloric foods as appropriate       INTERVENTIONS:  - Monitor oral intake, urinary output, labs, and treatment plans  - Assess nutrition and hydration status and recommend course of action  - Evaluate amount of meals eaten  - Assist patient with eating if necessary   - Allow adequate time for meals  - Recommend/ encourage appropriate diets, oral nutritional supplements, and vitamin/mineral supplements  - Order, calculate, and assess calorie counts as needed  - Recommend, monitor, and adjust tube feedings and TPN/PPN based on assessed needs  - Assess need for intravenous fluids  - Provide specific nutrition/hydration education as appropriate  - Include patient/family/caregiver in decisions related to nutrition  Outcome: Progressing     - Consider moving patient to room near nurses station  Outcome: Progressing

## 2022-06-18 NOTE — PROGRESS NOTES
Progress Note - general Surgery  Nat Hanks 47 y o  male MRN: [id number]  Unit/Bed#: [facility] 05 Encounter: [id number]    Assessment:  47 y o  male with super morbid obesity her with BMI of 68, presented with SBO is secondary to a chronic incarcerated ventral hernia, is s/p ex lap with extensive HARVEY on 06/14 with VAC placement, and is now 1 Day Post-Op s/p Procedure(s) (LRB):  ABDOMINAL WASHOUT, REPAIR OF SEROSAL TEARS,BRIDING VICRYL MESH, PARTIAL CLOSURE, APPLICATION OF WOUND VAC (N/A)  Wound VAC was placed intraoperatively given his elevated peak pressures with attempt to close  Patient remains acutely ill with elevated pressor requirements given his hypotension  The wound VAC was taken down overnight to eliminate cause of potential source of hypotension, however he remains mostly unchanged  Continues to have low urine output  Plan:  - Diet NPO  Adult 3-in-1 TPN (standard base / standard electrolytes)  - Pain and Nausea control PRN  - wean pressors as tolerated  - continue to wean vent as tolerated her once more stable  - daily dressing changes  Will consider replacing VAC at a later date  - continue amio drip  - continue heparin drip for history of DVT  - monitor in's and out's, and resuscitate as needed    Subjective/Objective     Subjective: Intubated and sedated    Objective:   Vitals: Blood pressure 125/69, pulse (!) 120, temperature 100 °F (37 8 °C), resp  rate 20, weight (!) 234 kg (516 lb 5 1 oz), SpO2 97 %  ,Body mass index is 72 01 kg/m²      I/O       06/16 0701  06/17 0700 06/17 0701  06/18 0700 06/18 0701  06/19 0700    I V  (mL/kg) 2723 1 (11 6) 3693 7 (15 8)     NG/GT 3055 880     IV Piggyback  1300     Total Intake(mL/kg) 5778 1 (24 7) 5873 7 (25 1)     Urine (mL/kg/hr) 700 (0 1) 870 (0 2)     Emesis/NG output 550 500     Drains 3925 1500     Total Output 5175 2870     Net +603 1 +3003 7                  Physical Exam:  Gen: NAD, Aox3, Comfortable in bed  Neuro:  Prop 15  Resp:  Intubated; CMV 490, a, 60%; peak pressure 37-42  Cardiac:  Levo 21, vaso 0 04, Amio 0 5, MAP 80 (cuff)  Abd: Soft, obese, wet to dry dressings hours over Vicryl mesh  Visible tissue is pink and well perfused  Ext: b/l Edema  Skin: Warm, Dry, Intact      Lab, Imaging and other studies:   I have personally reviewed pertinent reports    , CBC with diff:   Lab Results   Component Value Date    WBC 13 86 (H) 06/18/2022    HGB 9 6 (L) 06/18/2022    HCT 30 8 (L) 06/18/2022    MCV 95 06/18/2022     06/18/2022    MCH 29 7 06/18/2022    MCHC 31 2 (L) 06/18/2022    RDW 14 7 06/18/2022    MPV 10 2 06/18/2022   , BMP/CMP:   Lab Results   Component Value Date    SODIUM 134 (L) 06/18/2022    K 4 5 06/18/2022     06/18/2022    CO2 29 06/18/2022    CO2 35 (H) 06/17/2022    BUN 9 06/18/2022    CREATININE 0 64 06/18/2022    GLUCOSE 137 06/17/2022    CALCIUM 8 4 06/18/2022    AST 24 06/18/2022    ALT 16 06/18/2022    ALKPHOS 61 06/18/2022    EGFR 113 06/18/2022   , Coags:   Lab Results   Component Value Date    PTT 38 (H) 06/18/2022     VTE Pharmacologic Prophylaxis: Heparin  VTE Mechanical Prophylaxis: sequential compression device        Dayan Rosario MD  6/18/2022  7:56 AM

## 2022-06-18 NOTE — NUTRITION
06/18/22 1309   Recommendations/Interventions   Recommendations to Provider Recommend next bag TPN: 500ml 50% dextrose, 250ml 20% lipids, 775ml 15% AA to provide 1815kcal, 116g pro, 250g cho, 50g lipid, 0 74mg cho/kg/min  Monitor weight and electrolytes daily  Monitor triglycerides weekly

## 2022-06-19 LAB
ALBUMIN SERPL BCP-MCNC: 2 G/DL (ref 3.5–5)
ALP SERPL-CCNC: 63 U/L (ref 46–116)
ALT SERPL W P-5'-P-CCNC: 17 U/L (ref 12–78)
ANION GAP SERPL CALCULATED.3IONS-SCNC: 4 MMOL/L (ref 4–13)
APTT PPP: 70 SECONDS (ref 23–37)
APTT PPP: 73 SECONDS (ref 23–37)
AST SERPL W P-5'-P-CCNC: 18 U/L (ref 5–45)
BILIRUB SERPL-MCNC: 1.46 MG/DL (ref 0.2–1)
BUN SERPL-MCNC: 11 MG/DL (ref 5–25)
CA-I BLD-SCNC: 1.04 MMOL/L (ref 1.12–1.32)
CALCIUM ALBUM COR SERPL-MCNC: 9.8 MG/DL (ref 8.3–10.1)
CALCIUM SERPL-MCNC: 8.2 MG/DL (ref 8.3–10.1)
CHLORIDE SERPL-SCNC: 99 MMOL/L (ref 100–108)
CO2 SERPL-SCNC: 31 MMOL/L (ref 21–32)
CREAT SERPL-MCNC: 0.6 MG/DL (ref 0.6–1.3)
DIGOXIN SERPL-MCNC: 1.3 NG/ML (ref 0.8–2)
ERYTHROCYTE [DISTWIDTH] IN BLOOD BY AUTOMATED COUNT: 14.8 % (ref 11.6–15.1)
GFR SERPL CREATININE-BSD FRML MDRD: 116 ML/MIN/1.73SQ M
GLUCOSE SERPL-MCNC: 147 MG/DL (ref 65–140)
GLUCOSE SERPL-MCNC: 170 MG/DL (ref 65–140)
GLUCOSE SERPL-MCNC: 192 MG/DL (ref 65–140)
GLUCOSE SERPL-MCNC: 193 MG/DL (ref 65–140)
GLUCOSE SERPL-MCNC: 196 MG/DL (ref 65–140)
GLUCOSE SERPL-MCNC: 217 MG/DL (ref 65–140)
HCT VFR BLD AUTO: 25.8 % (ref 36.5–49.3)
HEPARIN CF II AG PPP IA-MCNC: <0.1 IU/ML
HGB BLD-MCNC: 8.1 G/DL (ref 12–17)
MAGNESIUM SERPL-MCNC: 2.2 MG/DL (ref 1.6–2.6)
MCH RBC QN AUTO: 29.3 PG (ref 26.8–34.3)
MCHC RBC AUTO-ENTMCNC: 31.4 G/DL (ref 31.4–37.4)
MCV RBC AUTO: 94 FL (ref 82–98)
PHOSPHATE SERPL-MCNC: 2.6 MG/DL (ref 2.7–4.5)
PLATELET # BLD AUTO: 160 THOUSANDS/UL (ref 149–390)
PMV BLD AUTO: 10.7 FL (ref 8.9–12.7)
POTASSIUM SERPL-SCNC: 4 MMOL/L (ref 3.5–5.3)
PROT SERPL-MCNC: 5.7 G/DL (ref 6.4–8.2)
RBC # BLD AUTO: 2.76 MILLION/UL (ref 3.88–5.62)
SODIUM SERPL-SCNC: 134 MMOL/L (ref 136–145)
WBC # BLD AUTO: 8.91 THOUSAND/UL (ref 4.31–10.16)

## 2022-06-19 PROCEDURE — 94760 N-INVAS EAR/PLS OXIMETRY 1: CPT

## 2022-06-19 PROCEDURE — 80162 ASSAY OF DIGOXIN TOTAL: CPT | Performed by: PHYSICIAN ASSISTANT

## 2022-06-19 PROCEDURE — 82948 REAGENT STRIP/BLOOD GLUCOSE: CPT

## 2022-06-19 PROCEDURE — 84100 ASSAY OF PHOSPHORUS: CPT

## 2022-06-19 PROCEDURE — 94760 N-INVAS EAR/PLS OXIMETRY 1: CPT | Performed by: SOCIAL WORKER

## 2022-06-19 PROCEDURE — 80053 COMPREHEN METABOLIC PANEL: CPT | Performed by: STUDENT IN AN ORGANIZED HEALTH CARE EDUCATION/TRAINING PROGRAM

## 2022-06-19 PROCEDURE — 85027 COMPLETE CBC AUTOMATED: CPT

## 2022-06-19 PROCEDURE — 99291 CRITICAL CARE FIRST HOUR: CPT | Performed by: STUDENT IN AN ORGANIZED HEALTH CARE EDUCATION/TRAINING PROGRAM

## 2022-06-19 PROCEDURE — 84478 ASSAY OF TRIGLYCERIDES: CPT | Performed by: SURGERY

## 2022-06-19 PROCEDURE — 85730 THROMBOPLASTIN TIME PARTIAL: CPT | Performed by: STUDENT IN AN ORGANIZED HEALTH CARE EDUCATION/TRAINING PROGRAM

## 2022-06-19 PROCEDURE — 85520 HEPARIN ASSAY: CPT | Performed by: PHYSICIAN ASSISTANT

## 2022-06-19 PROCEDURE — 82330 ASSAY OF CALCIUM: CPT

## 2022-06-19 PROCEDURE — 99024 POSTOP FOLLOW-UP VISIT: CPT | Performed by: SURGERY

## 2022-06-19 PROCEDURE — C9113 INJ PANTOPRAZOLE SODIUM, VIA: HCPCS | Performed by: SURGERY

## 2022-06-19 PROCEDURE — 94640 AIRWAY INHALATION TREATMENT: CPT

## 2022-06-19 PROCEDURE — 85730 THROMBOPLASTIN TIME PARTIAL: CPT | Performed by: PHYSICIAN ASSISTANT

## 2022-06-19 PROCEDURE — 83735 ASSAY OF MAGNESIUM: CPT

## 2022-06-19 PROCEDURE — 94003 VENT MGMT INPAT SUBQ DAY: CPT

## 2022-06-19 RX ORDER — HYDROMORPHONE HCL/PF 1 MG/ML
0.5 SYRINGE (ML) INJECTION EVERY 2 HOUR PRN
Status: DISCONTINUED | OUTPATIENT
Start: 2022-06-19 | End: 2022-06-20

## 2022-06-19 RX ORDER — FUROSEMIDE 10 MG/ML
20 INJECTION INTRAMUSCULAR; INTRAVENOUS ONCE
Status: COMPLETED | OUTPATIENT
Start: 2022-06-19 | End: 2022-06-19

## 2022-06-19 RX ORDER — FUROSEMIDE 10 MG/ML
40 INJECTION INTRAMUSCULAR; INTRAVENOUS
Status: COMPLETED | OUTPATIENT
Start: 2022-06-19 | End: 2022-06-19

## 2022-06-19 RX ORDER — HYDROMORPHONE HCL/PF 1 MG/ML
1 SYRINGE (ML) INJECTION EVERY 2 HOUR PRN
Status: DISCONTINUED | OUTPATIENT
Start: 2022-06-19 | End: 2022-06-20

## 2022-06-19 RX ADMIN — INSULIN LISPRO 1 UNITS: 100 INJECTION, SOLUTION INTRAVENOUS; SUBCUTANEOUS at 18:43

## 2022-06-19 RX ADMIN — VASOPRESSIN 0.04 UNITS/MIN: 20 INJECTION INTRAVENOUS at 02:00

## 2022-06-19 RX ADMIN — LEVALBUTEROL HYDROCHLORIDE 1.25 MG: 1.25 SOLUTION, CONCENTRATE RESPIRATORY (INHALATION) at 19:32

## 2022-06-19 RX ADMIN — HYDROMORPHONE HYDROCHLORIDE 0.5 MG: 1 INJECTION, SOLUTION INTRAMUSCULAR; INTRAVENOUS; SUBCUTANEOUS at 12:32

## 2022-06-19 RX ADMIN — IPRATROPIUM BROMIDE 0.5 MG: 0.5 SOLUTION RESPIRATORY (INHALATION) at 19:31

## 2022-06-19 RX ADMIN — HYDROCORTISONE SODIUM SUCCINATE 50 MG: 100 INJECTION, POWDER, FOR SOLUTION INTRAMUSCULAR; INTRAVENOUS at 00:34

## 2022-06-19 RX ADMIN — HYDROCORTISONE SODIUM SUCCINATE 50 MG: 100 INJECTION, POWDER, FOR SOLUTION INTRAMUSCULAR; INTRAVENOUS at 12:17

## 2022-06-19 RX ADMIN — HYDROMORPHONE HYDROCHLORIDE 0.5 MG: 1 INJECTION, SOLUTION INTRAMUSCULAR; INTRAVENOUS; SUBCUTANEOUS at 17:05

## 2022-06-19 RX ADMIN — BUDESONIDE 0.5 MG: 0.5 INHALANT ORAL at 07:51

## 2022-06-19 RX ADMIN — ACETYLCYSTEINE 600 MG: 200 SOLUTION ORAL; RESPIRATORY (INHALATION) at 07:51

## 2022-06-19 RX ADMIN — CEFEPIME HYDROCHLORIDE 2000 MG: 2 INJECTION, POWDER, FOR SOLUTION INTRAVENOUS at 18:35

## 2022-06-19 RX ADMIN — LEVALBUTEROL HYDROCHLORIDE 1.25 MG: 1.25 SOLUTION, CONCENTRATE RESPIRATORY (INHALATION) at 13:05

## 2022-06-19 RX ADMIN — Medication 100 MCG/HR: at 07:14

## 2022-06-19 RX ADMIN — BUDESONIDE 0.5 MG: 0.5 INHALANT ORAL at 19:31

## 2022-06-19 RX ADMIN — ACETYLCYSTEINE 600 MG: 200 SOLUTION ORAL; RESPIRATORY (INHALATION) at 13:05

## 2022-06-19 RX ADMIN — CALCIUM GLUCONATE 3 G: 98 INJECTION, SOLUTION INTRAVENOUS at 08:31

## 2022-06-19 RX ADMIN — HYDROCORTISONE SODIUM SUCCINATE 50 MG: 100 INJECTION, POWDER, FOR SOLUTION INTRAMUSCULAR; INTRAVENOUS at 05:34

## 2022-06-19 RX ADMIN — HYDROMORPHONE HYDROCHLORIDE 0.5 MG: 1 INJECTION, SOLUTION INTRAMUSCULAR; INTRAVENOUS; SUBCUTANEOUS at 09:42

## 2022-06-19 RX ADMIN — FUROSEMIDE 20 MG: 10 INJECTION, SOLUTION INTRAVENOUS at 01:24

## 2022-06-19 RX ADMIN — PANTOPRAZOLE SODIUM 40 MG: 40 INJECTION, POWDER, FOR SOLUTION INTRAVENOUS at 09:27

## 2022-06-19 RX ADMIN — IPRATROPIUM BROMIDE 0.5 MG: 0.5 SOLUTION RESPIRATORY (INHALATION) at 13:05

## 2022-06-19 RX ADMIN — METRONIDAZOLE 500 MG: 500 INJECTION, SOLUTION INTRAVENOUS at 10:44

## 2022-06-19 RX ADMIN — HYDROMORPHONE HYDROCHLORIDE 1 MG: 1 INJECTION, SOLUTION INTRAMUSCULAR; INTRAVENOUS; SUBCUTANEOUS at 21:30

## 2022-06-19 RX ADMIN — CHLORHEXIDINE GLUCONATE 15 ML: 1.2 SOLUTION ORAL at 21:30

## 2022-06-19 RX ADMIN — LEVALBUTEROL HYDROCHLORIDE 1.25 MG: 1.25 SOLUTION, CONCENTRATE RESPIRATORY (INHALATION) at 07:51

## 2022-06-19 RX ADMIN — CEFEPIME HYDROCHLORIDE 2000 MG: 2 INJECTION, POWDER, FOR SOLUTION INTRAVENOUS at 01:31

## 2022-06-19 RX ADMIN — METRONIDAZOLE 500 MG: 500 INJECTION, SOLUTION INTRAVENOUS at 18:39

## 2022-06-19 RX ADMIN — FUROSEMIDE 40 MG: 10 INJECTION, SOLUTION INTRAMUSCULAR; INTRAVENOUS at 17:05

## 2022-06-19 RX ADMIN — INSULIN LISPRO 2 UNITS: 100 INJECTION, SOLUTION INTRAVENOUS; SUBCUTANEOUS at 05:50

## 2022-06-19 RX ADMIN — HEPARIN SODIUM 26 UNITS/KG/HR: 10000 INJECTION, SOLUTION INTRAVENOUS at 00:47

## 2022-06-19 RX ADMIN — AMIODARONE HYDROCHLORIDE 1 MG/MIN: 50 INJECTION, SOLUTION INTRAVENOUS at 01:23

## 2022-06-19 RX ADMIN — FUROSEMIDE 40 MG: 10 INJECTION, SOLUTION INTRAMUSCULAR; INTRAVENOUS at 09:19

## 2022-06-19 RX ADMIN — METRONIDAZOLE 500 MG: 500 INJECTION, SOLUTION INTRAVENOUS at 01:56

## 2022-06-19 RX ADMIN — CEFEPIME HYDROCHLORIDE 2000 MG: 2 INJECTION, POWDER, FOR SOLUTION INTRAVENOUS at 11:46

## 2022-06-19 RX ADMIN — Medication: at 00:34

## 2022-06-19 RX ADMIN — DIGOXIN 250 MCG: 0.25 INJECTION INTRAMUSCULAR; INTRAVENOUS at 05:34

## 2022-06-19 RX ADMIN — SODIUM PHOSPHATE, MONOBASIC, MONOHYDRATE 12 MMOL: 276; 142 INJECTION, SOLUTION INTRAVENOUS at 08:17

## 2022-06-19 RX ADMIN — INSULIN LISPRO 1 UNITS: 100 INJECTION, SOLUTION INTRAVENOUS; SUBCUTANEOUS at 12:13

## 2022-06-19 RX ADMIN — HYDROMORPHONE HYDROCHLORIDE 0.5 MG: 1 INJECTION, SOLUTION INTRAMUSCULAR; INTRAVENOUS; SUBCUTANEOUS at 05:36

## 2022-06-19 RX ADMIN — HEPARIN SODIUM 26 UNITS/KG/HR: 10000 INJECTION, SOLUTION INTRAVENOUS at 09:13

## 2022-06-19 RX ADMIN — IPRATROPIUM BROMIDE 0.5 MG: 0.5 SOLUTION RESPIRATORY (INHALATION) at 07:51

## 2022-06-19 RX ADMIN — ACETYLCYSTEINE 600 MG: 200 SOLUTION ORAL; RESPIRATORY (INHALATION) at 19:31

## 2022-06-19 RX ADMIN — Medication: at 21:30

## 2022-06-19 RX ADMIN — HEPARIN SODIUM 26 UNITS/KG/HR: 10000 INJECTION, SOLUTION INTRAVENOUS at 16:46

## 2022-06-19 RX ADMIN — CHLORHEXIDINE GLUCONATE 15 ML: 1.2 SOLUTION ORAL at 08:59

## 2022-06-19 NOTE — PROGRESS NOTES
Progress Note - Critical Care   Madan Philippe 47 y o  male MRN: [id number]  Unit/Bed#: MICU 05 Encounter: [id number]    SUBJECTIVE:  Intubated    HPI/24HR Event:  80 of lasix total with 3L of urine output   After digoxin load heart rate improved to the 90s   No other significant event     ROS  Intubated and unable to assess     Assessment & Plan:   -Neuro:    - Analgesia/sedation              - Drip                          - Fentanyl 100              - prn                           - Dilaudid   Dx: Depression               - Resume home wellbutrin when able   - Delirium ppx: CAM-ICU, sleep hygiene        CV:   - Dx: Shock          - Distributive vs septic     - Bedside ECHO revealing similar findings as previous ECHO              - Pressor requirements coming down                - Hemodynamic support:                           - Levophed 6                          - Vasopressin 0 4              - Stress dose steroids    - Cefepime and flagyl started to address possible septic shock   - Dx: Afib with RVR              - Amiodarone gtt @ 1              - Heparin gtt   - Digoxin load completed this morning  Pending digoxin level   - HR currently in the 90s   - Dx: Hx of CHF               - Home meds:  Torsemide 40 BID and aldactone 50 daily               - Will administer IV lasix to aim for goal of -1L/day    - MAP goal > 65         Lung:   - Dx: Acute respiratory failure               - Currently on full vent support                           - PS 18/10 50%                          - SBT daily   - Dx: Hx of COPD               - Ipratropium and levalbuterol TID    - Pulmicort   - Mucomyst   - Dx: Hx of obesity hypoventilation syndrome               - Will evaluate once extubated   - Continue Respiratory Protocol and Airway Clearance Protocol        GI:   - Dx: SBO               - s/p ex lap HARVEY with abthera placement on 6/14              - 2nd look ex lap with initiation of DPR 6/15              - 3rd look repair of serosal tear, bridging mesh and partial closure    - VAC removed but replacement per surgery today  - Stress ulcer ppx: Protonix  - Bowel regimen: None         FEN:   - Fluids: no maintenance   - Electrolytes: trend and replete as needed  - Nutrition: TPN    - Consider transitioning to TF        :   - Dx: Low urine output               - 80 of lasix since yesterday with UOP of  3L               - Lasix to aim for -1L/day    - Despite 3L of output due to high IV infusion net positive   - With pressor requirements coming down consider NG   - Eastman in place      ID:   - Dx: Possible septic shock               - Culture    - Sputum: 2+ Polys and 1+ gram positive rajat    - Blood: Pending   - Patient remained afebrile   - Monitor WBC/temp curve     Heme:   - Dx: Anemia              - Hb 8 1 from 9 6               - Continue to monitor daily               - Transfuse if < 7  - Dx: Hx of DVT               - Home meds: pradaxa              - IVC filter still in place 2/2 inability to retrive               - Heparin gtt  - DVT ppx: Heparin gtt, SCDs      Endo:   - Dx: Hx of hypothyroidisim               - Levothyroxine when able   - Dx: Hyperglycemia              - ISS   - Goal -180     Msk/Skin:   - Dx: Pressure ulcer               - Continue wound care   - PT/OT  - Turning/repositioning  - Wound care      Disposition: Continue ICU care         Invasive lines and devices: Invasive Devices  Report    Peripherally Inserted Central Catheter Line  Duration           PICC Line 36/68/53 Right Basilic 5 days          Central Venous Catheter Line  Duration           CVC Central Lines 06/14/22 Triple 4 days          Drain  Duration           NG/OG/Enteral Tube Right nare 7 days    Urethral Catheter Latex 16 Fr  4 days          Airway  Duration           ETT  Hi-Lo; Cuffed;Oral 8 mm 4 days                   Physical exam  General: Resting comfortably in bed in no acute distress  Neuro: GCS 11T (Eye 4, Verbal 1, Motor 6)  HENT: Normocephalic and atraumatic, PERRL  Heart:  RRR, pulses intact  Lungs:  Bilateral breath sounds present  Abdomen:   Bowel sounds present, soft  Skin:  Warm, dry skin/Incision site with dressing applied that is clean dry and intact    Vitals  Temperature:   Temp (24hrs), Av 3 °F (37 4 °C), Min:98 6 °F (37 °C), Max:100 4 °F (38 °C)    Current: Temperature: 98 6 °F (37 °C)    Vitals:    22 0300 22 0400 22 0500 22 0600   BP: 119/51 119/52 113/56 128/58   Pulse: 94 90 92 94   Resp:       Temp: 98 6 °F (37 °C) 98 6 °F (37 °C) 98 6 °F (37 °C) 98 6 °F (37 °C)   TempSrc:       SpO2: 98% 99% 99% 99%   Weight:         Arterial Line BP: 70/58  Arterial Line MAP (mmHg): (!) 64 mmHg    Non-Invasive/Invasive Ventilation Settings:  Respiratory  Report   Lab Data (Last 4 hours)    None         O2/Vent Data (Last 4 hours)    None              No results found for: PHART, PVB2DDB, PO2ART, DSI6OBP, F2JGHLDA, BEART, SOURCE  SpO2: SpO2: 99 %    Intake and Outputs:  I/O        0701   0700  0701   0700    I V  (mL/kg) 3693 7 (15 8) 2400 3 (10 3)    NG/     IV Piggyback 1300 1070    TPN  1190 4    Total Intake(mL/kg) 5873 7 (25 1) 4660 7 (19 9)    Urine (mL/kg/hr) 870 (0 2) 2990 (0 5)    Emesis/NG output 500 300    Drains 1500     Total Output 2870 3290    Net +3003 7 +1370 7                Labs:   Results from last 7 days   Lab Units 22  0551 22  0424 22  2323 22  1307 22  0458 22  0406 06/15/22  1409   WBC Thousand/uL 8 91 13 86* 13 21*  --  14 58* 11 53* 10 59*   HEMOGLOBIN g/dL 8 1* 9 6* 9 4*  --  10 2* 10 7* 12 1   I STAT HEMOGLOBIN   --   --   --    < >  --   --   --    HEMATOCRIT % 25 8* 30 8* 30 0*  --  31 9* 33 4* 37 9   HEMATOCRIT, ISTAT   --   --   --    < >  --   --   --    PLATELETS Thousands/uL 160 208 238  --  216 197 171   NEUTROS PCT %  --   --   --   --  83* 79* 83*   MONOS PCT %  --   --   --   --  6 10 8   MONO PCT %  --   --  6  -- --   --   --     < > = values in this interval not displayed  Results from last 7 days   Lab Units 06/19/22  0551 06/18/22  0424 06/17/22  2323 06/17/22  1724 06/17/22  1307 06/15/22  0650 06/15/22  0449   SODIUM mmol/L 134* 134* 135*   < >  --    < > 136   POTASSIUM mmol/L 4 0 4 5 4 5   < >  --    < > 5 2   CHLORIDE mmol/L 99* 102 102   < >  --    < > 103   CO2 mmol/L 31 29 29   < >  --    < > 32   CO2, I-STAT mmol/L  --   --   --   --  35*  --   --    BUN mg/dL 11 9 11   < >  --    < > 6   CREATININE mg/dL 0 60 0 64 0 74   < >  --    < > 0 65   CALCIUM mg/dL 8 2* 8 4 8 3   < >  --    < > 8 7   ALK PHOS U/L 63 61  --   --   --   --  63   ALT U/L 17 16  --   --   --   --  29   AST U/L 18 24  --   --   --   --  15   GLUCOSE, ISTAT mg/dl  --   --   --   --  137  --   --     < > = values in this interval not displayed  Results from last 7 days   Lab Units 06/19/22  0551 06/18/22  0424 06/17/22  1724   MAGNESIUM mg/dL 2 2 2 5 2 6     Results from last 7 days   Lab Units 06/19/22  0551 06/18/22  0424 06/17/22  1724   PHOSPHORUS mg/dL 2 6* 2 7 4 0      Results from last 7 days   Lab Units 06/19/22  0551 06/19/22  0032 06/18/22  1539 06/15/22  2112 06/15/22  0449   INR   --   --   --   --  1 22*   PTT seconds 70* 73* 42*   < > 32    < > = values in this interval not displayed  Results from last 7 days   Lab Units 06/17/22  2323   LACTIC ACID mmol/L 2 0       Micro:  Lab Results   Component Value Date    BLOODCX Received in Microbiology Lab  Culture in Progress  06/18/2022    BLOODCX Received in Microbiology Lab  Culture in Progress  06/18/2022    BLOODCX No Growth After 5 Days   11/26/2021    URINECX >100,000 cfu/ml Mixed Contaminants X3 09/13/2016    WOUNDCULT (A) 11/26/2021     2+ Growth of Methicillin Resistant Staphylococcus aureus    SPUTUMCULTUR 3+ Growth of Mixed Respiratory Cece 10/11/2016    SPUTUMCULTUR 4+ Growth of Mixed Respiratory Cece 09/29/2016    SPUTUMCULTUR 3+ Growth of Mixed Respiratory Cece 09/18/2016       Resulted Labs    Pending Labs      Imaging Studies    Imaging:   XR chest portable ICU   Final Result by Rodriguez Rivera MD (06/19 0008)      1  Image quality limited but suspicious for pneumonia in the left lung base unchanged since the most recent prior study  Superimposed CHF would also be a consideration  2   Lines and tubes in satisfactory position  Workstation performed: [id number]         XR chest portable ICU   Final Result by Lisandra Irwin MD (06/17 2011)      Questioned left basilar infiltrate  [place] Heart shadow is enlarged and there is pulmonary vascular congestion concerning for CHF  Workstation performed: [id number]         XR chest portable ICU   Final Result by Nadia Coffey MD (06/17 1043)      Pulmonary vascular congestion without a definitive consolidation or effusion  No pneumothorax  Workstation performed: [id number]           I have personally reviewed pertinent reports  Allergies:    Allergies   Allergen Reactions    Penicillins Hives         Medications:   Scheduled Meds:  Current Facility-Administered Medications   Medication Dose Route Frequency Provider Last Rate    acetylcysteine  3 mL Nebulization TID Richmond Marcos MD      Adult TPN (CUSTOM BASE/CUSTOM ELECTROLYTE)   Intravenous Continuous TPN Tashia Liemery, PA-C 66 2 mL/hr at 06/19/22 0034    Adult TPN (CUSTOM BASE/CUSTOM ELECTROLYTE)   Intravenous Continuous TPN Tashia Liemery, PA-C      amiodarone  1 mg/min Intravenous Continuous Tashia Lien, PA-C 1 mg/min (06/19/22 0123)    budesonide  0 5 mg Nebulization Q12H Tashia Rosamaria, PA-C      calcium gluconate  3 g Intravenous Once Tashia Rosamaria, PA-C      cefepime  2,000 mg Intravenous Q8H Tashia Blank, PA-C Stopped (06/19/22 0200)    chlorhexidine  15 mL Mouth/Throat Q12H [address] Monique MD      dexmedetomidine  0 1-0 7 mcg/kg/hr Intravenous Titrated Tashia Rosamaria PA-JAMES Stopped (06/18/22 1115)    fentaNYL  100 mcg/hr Intravenous Continuous Muna Golden, PA-C 100 mcg/hr (06/19/22 0714)    heparin (porcine)  3-30 Units/kg/hr (Order-Specific) Intravenous Titrated Muna Golden, PA-C 26 Units/kg/hr (06/19/22 0047)    heparin (porcine)  10,000 Units Intravenous Q1H PRN Muna Golden, PA-C      heparin (porcine)  5,000 Units Intravenous Q1H PRN Muna Golden, PA-C      hydrocortisone sodium succinate  50 mg Intravenous Q6H [address] Muna Golden, [place]      HYDROmorphone  0 2 mg Intravenous Q2H PRN Muna Golden, PA-C      Or    HYDROmorphone  0 5 mg Intravenous Q2H PRN Muna Golden, [place]      HYDROmorphone  1 mg Intravenous Q4H PRN Muna Golden, PA-C      insulin lispro  1-5 Units Subcutaneous Q6H [address] hCang Frias MD      ipratropium  0 5 mg Nebulization TID Steph Lombardi MD      levalbuterol  1 25 mg Nebulization TID Steph Lombardi MD      metroNIDAZOLE  500 mg Intravenous Q8H Muna Golden, [place] Stopped (06/19/22 0250)    norepinephrine  1-30 mcg/min Intravenous Titrated Muna Golden, PA-C 5 mcg/min (06/19/22 0600)    pantoprazole  40 mg Intravenous Q24H [address] Rajani Aj MD      sodium phosphate  12 mmol Intravenous Once [facility], [place]      vasopressin  0 04 Units/min Intravenous Continuous Muna Golden, PA-C 0 04 Units/min (06/19/22 0200)     Continuous Infusions:Adult TPN (CUSTOM BASE/CUSTOM ELECTROLYTE), , Last Rate: 66 2 mL/hr at 06/19/22 0034  Adult TPN (CUSTOM BASE/CUSTOM ELECTROLYTE),   amiodarone, 1 mg/min, Last Rate: 1 mg/min (06/19/22 0123)  dexmedetomidine, 0 1-0 7 mcg/kg/hr, Last Rate: Stopped (06/18/22 1115)  fentaNYL, 100 mcg/hr, Last Rate: 100 mcg/hr (06/19/22 0714)  heparin (porcine), 3-30 Units/kg/hr (Order-Specific), Last Rate: 26 Units/kg/hr (06/19/22 0047)  norepinephrine, 1-30 mcg/min, Last Rate: 5 mcg/min (06/19/22 0600)  vasopressin, 0 04 Units/min, Last Rate: 0 04 Units/min (06/19/22 0200)      PRN Meds:  heparin (porcine), 10,000 Units, Q1H PRN  heparin (porcine), 5,000 Units, Q1H PRN  HYDROmorphone, 0 2 mg, Q2H PRN   Or  HYDROmorphone, 0 5 mg, Q2H PRN  HYDROmorphone, 1 mg, Q4H PRN        Counseling / Coordination of Care  Total Critical Care time spent 30 minutes excluding procedures, teaching and family updates  Code Status: Level 1 - Full Code     Portions of the record may have been created with voice recognition software  Occasional wrong word or "sound a like" substitutions may have occurred due to the inherent limitations of voice recognition software  Read the chart carefully and recognize, using context, where substitutions have occurred       Frank Rodriguez MD

## 2022-06-19 NOTE — RESPIRATORY THERAPY NOTE
RT Ventilator Management Note  Audra Romano 47 y o  male MRN: [id number]  Unit/Bed#: MICU 05 Encounter: [id number]      Daily Screen         6/18/2022  0714 6/19/2022  0754          Patient safety screen outcome[de-identified] Failed Passed      Not Ready for Weaning due to[de-identified] Poor inspiratory effort --      Spont breathing trial outcome[de-identified] Failed --      Spont breathing trial reason failed: Tidal volume < 4ml/Kg of ideal body weight or VE <5 or  >15 LPM --      Previous settings resumed: Yes --                Physical Exam:   Assessment Type: Assess only  General Appearance: Drowsy  Respiratory Pattern: Assisted  Chest Assessment: Chest expansion symmetrical  Bilateral Breath Sounds: Diminished      Resp Comments: pt found on spont settings 18/10 resting comfortably at this time  on spont settings since 6/8 at 1130   ETT 24 at lip  will continue to monitor

## 2022-06-19 NOTE — RESPIRATORY THERAPY NOTE
Resp care   06/19/22 1542   Respiratory Assessment   Resp Comments Pt awake and c/o pain  Vent changed to ASV mode with MV%110  Pt has hx of copd but RCexp on vent within normal lung limits  Pt cont to be fully spont with resp effort and now sleeping  Will cont to monitor  ETT  Hi-Lo; Cuffed;Oral 8 mm   Placement Date/Time: 06/14/22 0819   Mask Ventilation: Mask ventilation not attempted (0)  Preoxygenated: Yes  Technique: Stylet;Rapid sequence;Video laryngoscopy  Type: Hi-Lo; Cuffed;Oral  Tube Size: 8 mm  Laryngoscope: [facility]  Blade Size: 4  Locatio       Secured at (cm) 24   Measured from [facility] Left

## 2022-06-19 NOTE — PROGRESS NOTES
Progress Note - general Surgery  Avinash Dietrich 47 y o  male MRN: [id number]  Unit/Bed#: MICU 05 Encounter: [id number]    Assessment:  47 y o  male with super morbid obesity her with BMI of 68, presented with SBO is secondary to a chronic incarcerated ventral hernia, is s/p ex lap with extensive HARVEY on 06/14 with VAC placement, and is now 2 Days Post-Op s/p Procedure(s) (LRB):  ABDOMINAL WASHOUT, REPAIR OF SEROSAL TEARS,BRIDING VICRYL MESH, PARTIAL CLOSURE, APPLICATION OF WOUND VAC (N/A)  Wound VAC was placed intraoperatively given his elevated peak pressures with attempt to close  Wound VAC taken down overnight 6/17-6/18 2/2 elevated peak pressures and hypotension  Levo @ 5  Vaso @ 0 04    Peak Pressure 28    PS 18/10 @ 50%     Plan:  - Diet NPO  Adult 3-in-1 TPN (custom base / custom electrolytes)  Adult 3-in-1 TPN (custom base / custom electrolytes)  - Pain and Nausea control PRN  - wean pressors as tolerated  - continue to wean vent as tolerated her once more stable  - daily dressing changes   - Wound VAC replacement today  - continue amio drip  - continue heparin drip for history of DVT  - monitor in's and out's, and resuscitate as needed    Subjective/Objective     Subjective: Pressors weaned yesterday    Objective:   Vitals: Blood pressure 128/58, pulse 94, temperature 98 6 °F (37 °C), resp  rate 20, weight (!) 234 kg (516 lb 5 1 oz), SpO2 99 %  ,Body mass index is 72 01 kg/m²      I/O       06/16 0701  06/17 0700 06/17 0701  06/18 0700 06/18 0701  06/19 0700    I V  (mL/kg) 2723 1 (11 6) 3693 7 (15 8)     NG/GT 3055 880     IV Piggyback  1300     Total Intake(mL/kg) 5778 1 (24 7) 5873 7 (25 1)     Urine (mL/kg/hr) 700 (0 1) 870 (0 2)     Emesis/NG output 550 500     Drains 3925 1500     Total Output 5175 2870     Net +603 1 +3003 7                  Physical Exam: General: AAOx3  Head: normocephalic, atraumatic  Neck: supple, trachea midline   Respiratory: BS b/l  Abdomen: Soft, morbidly obese, dressings in place, drainage  Heart: RRR, S1s2  Ext: Warm no cyanosis         Lab, Imaging and other studies:   I have personally reviewed pertinent reports    , CBC with diff:   Lab Results   Component Value Date    WBC 8 91 06/19/2022    HGB 8 1 (L) 06/19/2022    HCT 25 8 (L) 06/19/2022    MCV 94 06/19/2022     06/19/2022    MCH 29 3 06/19/2022    MCHC 31 4 06/19/2022    RDW 14 8 06/19/2022    MPV 10 7 06/19/2022   , BMP/CMP:   Lab Results   Component Value Date    SODIUM 134 (L) 06/19/2022    K 4 0 06/19/2022    CL 99 (L) 06/19/2022    CO2 31 06/19/2022    BUN 11 06/19/2022    CREATININE 0 60 06/19/2022    CALCIUM 8 2 (L) 06/19/2022    AST 18 06/19/2022    ALT 17 06/19/2022    ALKPHOS 63 06/19/2022    EGFR 116 06/19/2022   , Coags:   Lab Results   Component Value Date    PTT 70 (H) 06/19/2022     VTE Pharmacologic Prophylaxis: Heparin  VTE Mechanical Prophylaxis: sequential compression device        Royce Jordan MD  6/19/2022  7:09 AM

## 2022-06-20 LAB
ALBUMIN SERPL BCP-MCNC: 2.3 G/DL (ref 3.5–5)
ALP SERPL-CCNC: 56 U/L (ref 46–116)
ALT SERPL W P-5'-P-CCNC: 13 U/L (ref 12–78)
ANION GAP SERPL CALCULATED.3IONS-SCNC: 5 MMOL/L (ref 4–13)
APTT PPP: 79 SECONDS (ref 23–37)
ARTERIAL PATENCY WRIST A: YES
AST SERPL W P-5'-P-CCNC: 13 U/L (ref 5–45)
BACTERIA SPT RESP CULT: ABNORMAL
BACTERIA SPT RESP CULT: ABNORMAL
BASE EXCESS BLDA CALC-SCNC: 8.1 MMOL/L
BILIRUB DIRECT SERPL-MCNC: 0.42 MG/DL (ref 0–0.2)
BILIRUB SERPL-MCNC: 0.7 MG/DL (ref 0.2–1)
BUN SERPL-MCNC: 16 MG/DL (ref 5–25)
CA-I BLD-SCNC: 1.11 MMOL/L (ref 1.12–1.32)
CALCIUM SERPL-MCNC: 8.4 MG/DL (ref 8.3–10.1)
CHLORIDE SERPL-SCNC: 99 MMOL/L (ref 100–108)
CO2 SERPL-SCNC: 32 MMOL/L (ref 21–32)
CREAT SERPL-MCNC: 0.54 MG/DL (ref 0.6–1.3)
ERYTHROCYTE [DISTWIDTH] IN BLOOD BY AUTOMATED COUNT: 14.6 % (ref 11.6–15.1)
GFR SERPL CREATININE-BSD FRML MDRD: 121 ML/MIN/1.73SQ M
GLUCOSE SERPL-MCNC: 110 MG/DL (ref 65–140)
GLUCOSE SERPL-MCNC: 117 MG/DL (ref 65–140)
GLUCOSE SERPL-MCNC: 126 MG/DL (ref 65–140)
GLUCOSE SERPL-MCNC: 129 MG/DL (ref 65–140)
GLUCOSE SERPL-MCNC: 165 MG/DL (ref 65–140)
GRAM STN SPEC: ABNORMAL
GRAM STN SPEC: ABNORMAL
HCO3 BLDA-SCNC: 34.4 MMOL/L (ref 22–28)
HCT VFR BLD AUTO: 25.3 % (ref 36.5–49.3)
HGB BLD-MCNC: 7.8 G/DL (ref 12–17)
MAGNESIUM SERPL-MCNC: 2.1 MG/DL (ref 1.6–2.6)
MCH RBC QN AUTO: 29.7 PG (ref 26.8–34.3)
MCHC RBC AUTO-ENTMCNC: 30.8 G/DL (ref 31.4–37.4)
MCV RBC AUTO: 96 FL (ref 82–98)
O2 CT BLDA-SCNC: 11.6 ML/DL (ref 16–23)
OXYHGB MFR BLDA: 97.2 % (ref 94–97)
PCO2 BLDA: 59.6 MM HG (ref 36–44)
PH BLDA: 7.38 [PH] (ref 7.35–7.45)
PHOSPHATE SERPL-MCNC: 2.5 MG/DL (ref 2.7–4.5)
PLATELET # BLD AUTO: 141 THOUSANDS/UL (ref 149–390)
PMV BLD AUTO: 10.8 FL (ref 8.9–12.7)
PO2 BLDA: 118 MM HG (ref 75–129)
POTASSIUM SERPL-SCNC: 3.3 MMOL/L (ref 3.5–5.3)
PROT SERPL-MCNC: 6 G/DL (ref 6.4–8.2)
RBC # BLD AUTO: 2.63 MILLION/UL (ref 3.88–5.62)
SODIUM SERPL-SCNC: 136 MMOL/L (ref 136–145)
SPECIMEN SOURCE: ABNORMAL
TRIGL SERPL-MCNC: 556 MG/DL
WBC # BLD AUTO: 6.26 THOUSAND/UL (ref 4.31–10.16)

## 2022-06-20 PROCEDURE — 82805 BLOOD GASES W/O2 SATURATION: CPT | Performed by: STUDENT IN AN ORGANIZED HEALTH CARE EDUCATION/TRAINING PROGRAM

## 2022-06-20 PROCEDURE — 82948 REAGENT STRIP/BLOOD GLUCOSE: CPT

## 2022-06-20 PROCEDURE — 85027 COMPLETE CBC AUTOMATED: CPT | Performed by: PHYSICIAN ASSISTANT

## 2022-06-20 PROCEDURE — 94640 AIRWAY INHALATION TREATMENT: CPT | Performed by: SOCIAL WORKER

## 2022-06-20 PROCEDURE — 94640 AIRWAY INHALATION TREATMENT: CPT

## 2022-06-20 PROCEDURE — 36600 WITHDRAWAL OF ARTERIAL BLOOD: CPT | Performed by: SOCIAL WORKER

## 2022-06-20 PROCEDURE — 94760 N-INVAS EAR/PLS OXIMETRY 1: CPT

## 2022-06-20 PROCEDURE — NC001 PR NO CHARGE: Performed by: STUDENT IN AN ORGANIZED HEALTH CARE EDUCATION/TRAINING PROGRAM

## 2022-06-20 PROCEDURE — 80048 BASIC METABOLIC PNL TOTAL CA: CPT | Performed by: PHYSICIAN ASSISTANT

## 2022-06-20 PROCEDURE — 99233 SBSQ HOSP IP/OBS HIGH 50: CPT | Performed by: SURGERY

## 2022-06-20 PROCEDURE — 94002 VENT MGMT INPAT INIT DAY: CPT

## 2022-06-20 PROCEDURE — 85730 THROMBOPLASTIN TIME PARTIAL: CPT | Performed by: STUDENT IN AN ORGANIZED HEALTH CARE EDUCATION/TRAINING PROGRAM

## 2022-06-20 PROCEDURE — 82330 ASSAY OF CALCIUM: CPT | Performed by: PHYSICIAN ASSISTANT

## 2022-06-20 PROCEDURE — C9113 INJ PANTOPRAZOLE SODIUM, VIA: HCPCS | Performed by: SURGERY

## 2022-06-20 PROCEDURE — 83735 ASSAY OF MAGNESIUM: CPT | Performed by: PHYSICIAN ASSISTANT

## 2022-06-20 PROCEDURE — 84100 ASSAY OF PHOSPHORUS: CPT | Performed by: PHYSICIAN ASSISTANT

## 2022-06-20 PROCEDURE — 80076 HEPATIC FUNCTION PANEL: CPT | Performed by: SURGERY

## 2022-06-20 PROCEDURE — 2W13X6Z COMPRESSION OF ABDOMINAL WALL USING PRESSURE DRESSING: ICD-10-PCS | Performed by: STUDENT IN AN ORGANIZED HEALTH CARE EDUCATION/TRAINING PROGRAM

## 2022-06-20 RX ORDER — FUROSEMIDE 10 MG/ML
40 INJECTION INTRAMUSCULAR; INTRAVENOUS
Status: DISCONTINUED | OUTPATIENT
Start: 2022-06-20 | End: 2022-06-21

## 2022-06-20 RX ORDER — AMOXICILLIN 250 MG
1 CAPSULE ORAL
Status: DISCONTINUED | OUTPATIENT
Start: 2022-06-20 | End: 2022-06-20

## 2022-06-20 RX ORDER — ALBUMIN, HUMAN INJ 5% 5 %
25 SOLUTION INTRAVENOUS ONCE
Status: COMPLETED | OUTPATIENT
Start: 2022-06-20 | End: 2022-06-20

## 2022-06-20 RX ORDER — AMIODARONE HYDROCHLORIDE 200 MG/1
200 TABLET ORAL 2 TIMES DAILY WITH MEALS
Status: DISCONTINUED | OUTPATIENT
Start: 2022-06-20 | End: 2022-06-21

## 2022-06-20 RX ORDER — ALBUMIN (HUMAN) 12.5 G/50ML
25 SOLUTION INTRAVENOUS ONCE
Status: COMPLETED | OUTPATIENT
Start: 2022-06-20 | End: 2022-06-20

## 2022-06-20 RX ORDER — QUETIAPINE FUMARATE 25 MG/1
50 TABLET, FILM COATED ORAL
Status: DISCONTINUED | OUTPATIENT
Start: 2022-06-20 | End: 2022-07-19 | Stop reason: HOSPADM

## 2022-06-20 RX ORDER — DIGOXIN 0.05 MG/ML
250 SOLUTION ORAL ONCE
Status: COMPLETED | OUTPATIENT
Start: 2022-06-20 | End: 2022-06-20

## 2022-06-20 RX ORDER — OXYCODONE HCL 5 MG/5 ML
5 SOLUTION, ORAL ORAL EVERY 4 HOURS PRN
Status: DISCONTINUED | OUTPATIENT
Start: 2022-06-20 | End: 2022-07-19 | Stop reason: HOSPADM

## 2022-06-20 RX ORDER — SPIRONOLACTONE 50 MG/1
50 TABLET, FILM COATED ORAL DAILY
Status: DISCONTINUED | OUTPATIENT
Start: 2022-06-20 | End: 2022-07-08

## 2022-06-20 RX ORDER — AMOXICILLIN 250 MG
1 CAPSULE ORAL 2 TIMES DAILY
Status: DISCONTINUED | OUTPATIENT
Start: 2022-06-20 | End: 2022-07-18

## 2022-06-20 RX ORDER — DIGOXIN 0.05 MG/ML
250 SOLUTION ORAL DAILY
Status: DISCONTINUED | OUTPATIENT
Start: 2022-06-21 | End: 2022-06-21

## 2022-06-20 RX ORDER — OXYCODONE HCL 5 MG/5 ML
10 SOLUTION, ORAL ORAL EVERY 4 HOURS PRN
Status: DISCONTINUED | OUTPATIENT
Start: 2022-06-20 | End: 2022-07-19 | Stop reason: HOSPADM

## 2022-06-20 RX ORDER — FUROSEMIDE 10 MG/ML
20 INJECTION INTRAMUSCULAR; INTRAVENOUS ONCE
Status: COMPLETED | OUTPATIENT
Start: 2022-06-20 | End: 2022-06-20

## 2022-06-20 RX ORDER — DIGOXIN 0.05 MG/ML
250 SOLUTION ORAL DAILY
Status: DISCONTINUED | OUTPATIENT
Start: 2022-06-21 | End: 2022-06-20

## 2022-06-20 RX ADMIN — CEFEPIME HYDROCHLORIDE 2000 MG: 2 INJECTION, POWDER, FOR SOLUTION INTRAVENOUS at 10:41

## 2022-06-20 RX ADMIN — SENNOSIDES AND DOCUSATE SODIUM 1 TABLET: 8.6; 5 TABLET ORAL at 10:54

## 2022-06-20 RX ADMIN — CHLORHEXIDINE GLUCONATE 15 ML: 1.2 SOLUTION ORAL at 08:28

## 2022-06-20 RX ADMIN — SPIRONOLACTONE 50 MG: 50 TABLET ORAL at 13:12

## 2022-06-20 RX ADMIN — Medication: at 22:18

## 2022-06-20 RX ADMIN — IPRATROPIUM BROMIDE 0.5 MG: 0.5 SOLUTION RESPIRATORY (INHALATION) at 13:29

## 2022-06-20 RX ADMIN — HYDROMORPHONE HYDROCHLORIDE 1 MG: 1 INJECTION, SOLUTION INTRAMUSCULAR; INTRAVENOUS; SUBCUTANEOUS at 01:03

## 2022-06-20 RX ADMIN — FUROSEMIDE 40 MG: 10 INJECTION, SOLUTION INTRAMUSCULAR; INTRAVENOUS at 15:46

## 2022-06-20 RX ADMIN — HYDROMORPHONE HYDROCHLORIDE 1 MG: 1 INJECTION, SOLUTION INTRAMUSCULAR; INTRAVENOUS; SUBCUTANEOUS at 03:20

## 2022-06-20 RX ADMIN — CEFEPIME HYDROCHLORIDE 2000 MG: 2 INJECTION, POWDER, FOR SOLUTION INTRAVENOUS at 02:29

## 2022-06-20 RX ADMIN — LEVALBUTEROL HYDROCHLORIDE 1.25 MG: 1.25 SOLUTION, CONCENTRATE RESPIRATORY (INHALATION) at 07:01

## 2022-06-20 RX ADMIN — ACETYLCYSTEINE 600 MG: 200 SOLUTION ORAL; RESPIRATORY (INHALATION) at 19:57

## 2022-06-20 RX ADMIN — METRONIDAZOLE 500 MG: 500 INJECTION, SOLUTION INTRAVENOUS at 02:06

## 2022-06-20 RX ADMIN — HEPARIN SODIUM 26 UNITS/KG/HR: 10000 INJECTION, SOLUTION INTRAVENOUS at 23:08

## 2022-06-20 RX ADMIN — ALBUMIN (HUMAN) 25 G: 0.25 INJECTION, SOLUTION INTRAVENOUS at 02:29

## 2022-06-20 RX ADMIN — HEPARIN SODIUM 26 UNITS/KG/HR: 10000 INJECTION, SOLUTION INTRAVENOUS at 15:06

## 2022-06-20 RX ADMIN — POTASSIUM PHOSPHATE, MONOBASIC AND POTASSIUM PHOSPHATE, DIBASIC 30 MMOL: 224; 236 INJECTION, SOLUTION, CONCENTRATE INTRAVENOUS at 08:02

## 2022-06-20 RX ADMIN — HEPARIN SODIUM 26 UNITS/KG/HR: 10000 INJECTION, SOLUTION INTRAVENOUS at 07:53

## 2022-06-20 RX ADMIN — FUROSEMIDE 20 MG: 10 INJECTION, SOLUTION INTRAMUSCULAR; INTRAVENOUS at 03:30

## 2022-06-20 RX ADMIN — OXYCODONE HYDROCHLORIDE 10 MG: 5 SOLUTION ORAL at 17:15

## 2022-06-20 RX ADMIN — QUETIAPINE FUMARATE 50 MG: 25 TABLET ORAL at 22:18

## 2022-06-20 RX ADMIN — METRONIDAZOLE 500 MG: 500 INJECTION, SOLUTION INTRAVENOUS at 11:35

## 2022-06-20 RX ADMIN — INSULIN LISPRO 1 UNITS: 100 INJECTION, SOLUTION INTRAVENOUS; SUBCUTANEOUS at 11:48

## 2022-06-20 RX ADMIN — AMIODARONE HYDROCHLORIDE 0.5 MG/MIN: 50 INJECTION, SOLUTION INTRAVENOUS at 00:38

## 2022-06-20 RX ADMIN — CEFTRIAXONE 2000 MG: 2 INJECTION, POWDER, FOR SOLUTION INTRAMUSCULAR; INTRAVENOUS at 17:22

## 2022-06-20 RX ADMIN — AMIODARONE HYDROCHLORIDE 200 MG: 200 TABLET ORAL at 15:46

## 2022-06-20 RX ADMIN — ALBUMIN (HUMAN) 25 G: 12.5 INJECTION, SOLUTION INTRAVENOUS at 08:32

## 2022-06-20 RX ADMIN — ACETYLCYSTEINE 600 MG: 200 SOLUTION ORAL; RESPIRATORY (INHALATION) at 07:01

## 2022-06-20 RX ADMIN — DIGOXIN 250 MCG: 0.05 SOLUTION ORAL at 20:09

## 2022-06-20 RX ADMIN — ACETYLCYSTEINE 600 MG: 200 SOLUTION ORAL; RESPIRATORY (INHALATION) at 13:29

## 2022-06-20 RX ADMIN — HYDROMORPHONE HYDROCHLORIDE 0.5 MG: 1 INJECTION, SOLUTION INTRAMUSCULAR; INTRAVENOUS; SUBCUTANEOUS at 08:27

## 2022-06-20 RX ADMIN — IPRATROPIUM BROMIDE 0.5 MG: 0.5 SOLUTION RESPIRATORY (INHALATION) at 19:57

## 2022-06-20 RX ADMIN — HYDROMORPHONE HYDROCHLORIDE 1 MG: 1 INJECTION, SOLUTION INTRAMUSCULAR; INTRAVENOUS; SUBCUTANEOUS at 00:10

## 2022-06-20 RX ADMIN — LEVALBUTEROL HYDROCHLORIDE 1.25 MG: 1.25 SOLUTION, CONCENTRATE RESPIRATORY (INHALATION) at 13:29

## 2022-06-20 RX ADMIN — OXYCODONE HYDROCHLORIDE 10 MG: 5 SOLUTION ORAL at 12:27

## 2022-06-20 RX ADMIN — BUDESONIDE 0.5 MG: 0.5 INHALANT ORAL at 19:57

## 2022-06-20 RX ADMIN — BUDESONIDE 0.5 MG: 0.5 INHALANT ORAL at 07:01

## 2022-06-20 RX ADMIN — IPRATROPIUM BROMIDE 0.5 MG: 0.5 SOLUTION RESPIRATORY (INHALATION) at 07:01

## 2022-06-20 RX ADMIN — HEPARIN SODIUM 26 UNITS/KG/HR: 10000 INJECTION, SOLUTION INTRAVENOUS at 00:10

## 2022-06-20 RX ADMIN — LEVALBUTEROL HYDROCHLORIDE 1.25 MG: 1.25 SOLUTION, CONCENTRATE RESPIRATORY (INHALATION) at 19:56

## 2022-06-20 RX ADMIN — PANTOPRAZOLE SODIUM 40 MG: 40 INJECTION, POWDER, FOR SOLUTION INTRAVENOUS at 08:28

## 2022-06-20 NOTE — PROGRESS NOTES
Progress Note - Critical Care   Cesar Hinton 47 y o  male MRN: [id number]  [facility]: [id number]    SUBJECTIVE:  Intubated    HPI/24HR Event:  No acute events overnight     ROS  Intubated     Assessment & Plan:   -Neuro:    - Analgesia/sedation              - prn                           - Dilaudid   Dx: Depression               - Resume home wellbutrin when able   - Delirium ppx: CAM-ICU, sleep hygiene        CV:   - Dx: Shock                     - Distributive vs septic                           - Bedside ECHO revealing similar findings as previous ECHO              - Now off pressors               - Stress dose steroids discontinued               - Cefepime and flagyl started 6/18 for possible septic shock   - Dx: Afib with RVR              - Amiodarone gtt @ 0 5 from 1              - Heparin gtt              - Digoxin load completed yesterday  - Digoxin at therapeutic level at 1 3              - HR has been in 90s to 100s    - Dx: Hx of CHF               - Home meds:  Torsemide 40 BID and aldactone 50 daily               - Will administer 40 IV lasix BID      - Hold home aldactone   - Dx: Hypertriglyceridemia    - 172 (6/13) to 556 (6/19)   - In setting of propofol infusion that has since discontinued (6/18)  - MAP goal > 65         Lung:   - Dx: Acute respiratory failure               - Currently on full vent support                           - PS 18/10/40%                          - Wean and aim for extubation   - Dx: Hx of COPD               - Ipratropium and levalbuterol TID               - Pulmicort              - Mucomyst   - Dx: Hx of obesity hypoventilation syndrome               - Will evaluate once extubated   - Continue Respiratory Protocol and Airway Clearance Protocol        GI:   - Dx: SBO               - s/p ex lap HARVEY with abthera placement on 6/14              - 2nd look ex lap with initiation of DPR 6/15              - 3rd look repair of serosal tear, bridging mesh and partial closure                          - VAC added yesterday   - Patient no longer on pressors consider using NG for trickle feed   - Stress ulcer ppx: Protonix  - Bowel regimen: None         FEN:   - Fluids: no maintenance   - Electrolytes: trend and replete as needed  - Nutrition: TPN               - Consider transitioning to TF        :   - Dx: Low urine output               - Lasix 40 BID               - Lasix to aim for -1L/day   - Eastman in place      ID:   - Dx: Possible septic shock               - Culture                          - Sputum: 2+ Polys and 1+ gram positive rajat                          - Blood: Pending   - Currently on antibiotics with presumptive septic shock     - Shock resolved consider d/c flagyl     - With positive sputum cx continue cefepime               - Patient remained afebrile   - Monitor WBC/temp curve     Heme:   - Dx: Anemia              - Hb 7 8 from 8 1               - Continue to monitor daily               - Transfuse if < 7  - Dx: Hx of DVT               - Home meds: pradaxa              - IVC filter still in place 2/2 inability to retreive               - Heparin gtt  - DVT ppx: Heparin gtt, SCDs      Endo:   - Dx: Hx of hypothyroidisim               - Levothyroxine when able   - Dx: Hyperglycemia              - ISS   - Goal -180     Msk/Skin:   - Dx: Pressure ulcer               - Continue wound care   - PT/OT  - Turning/repositioning  - Wound care      Disposition: Continue ICU care         Invasive lines and devices: Invasive Devices  Report    Peripherally Inserted Central Catheter Line  Duration           PICC Line 07/68/74 Right Basilic 6 days          Central Venous Catheter Line  Duration           CVC Central Lines 06/14/22 Triple 6 days          Drain  Duration           NG/OG/Enteral Tube Right nare 8 days    Urethral Catheter Latex 16 Fr  6 days          Airway  Duration           ETT  Hi-Lo; Cuffed;Oral 8 mm 6 days                   Physical exam  General: Resting comfortably in bed in no acute distress  Neuro: GCS 11T (Eye 4, Verbal 1, Motor 6)  HENT:  Normocephalic and atraumatic, PERRL  Heart:  RRR, pulses intact  Lungs:  Bilateral breath sounds present  Abdomen:  Bowel sounds present, soft, VAC in place   Skin:  Warm, dry skin/Incision site clean dry and intact    Vitals  Temperature:   Temp (24hrs), Av 2 °F (37 3 °C), Min:98 96 °F (37 2 °C), Max:99 68 °F (37 6 °C)    Current: Temperature: 98 96 °F (37 2 °C)    Vitals:    22 0535 22 0545 22 0600 22 0800   BP:  (!) 105/46 93/52 128/55   BP Location:    Right arm   Pulse:  100 98 100   Resp:    18   Temp:  98 96 °F (37 2 °C) 98 96 °F (37 2 °C) 98 96 °F (37 2 °C)   TempSrc:    Esophageal   SpO2:  98% 98% 98%   Weight: (!) 234 kg (515 lb 14 oz)      Height: 5' 11" (1 803 m)        Arterial Line BP: 70/58  Arterial Line MAP (mmHg): (!) 64 mmHg    Non-Invasive/Invasive Ventilation Settings:  Respiratory  Report   Lab Data (Last 4 hours)    None         O2/Vent Data (Last 4 hours)       0717          Patient safety screen outcome: Passed                 No results found for: PHART, OEE6UWH, PO2ART, SGL5DEO, J4GNMLTP, BEART, SOURCE  SpO2: SpO2: 98 %    Intake and Outputs:  I/O        0701   0700  0701   0700    I V  (mL/kg) 2692 6 (11 5) 1481 2 (6 3)    NG/GT  20    IV Piggyback 1070 621 7    TPN 1385 7 1591 6    Total Intake(mL/kg) 5148 3 (22) 3714 5 (15 9)    Urine (mL/kg/hr) 3150 (0 6) 2260 (0 4)    Emesis/NG output 300 900    Drains  200    Total Output 3450 3360    Net +1698 3 +354 5                Labs:   Results from last 7 days   Lab Units 22  0459 22  0551 22  0424 22  2323 22  1307 22  0458 22  0406 06/15/22  1409   WBC Thousand/uL 6 26 8 91 13 86* 13 21*  --  14 58* 11 53* 10 59*   HEMOGLOBIN g/dL 7 8* 8 1* 9 6* 9 4*  --  10 2* 10 7* 12 1   I STAT HEMOGLOBIN   --   --   --   --    < >  --   --   -- HEMATOCRIT % 25 3* 25 8* 30 8* 30 0*  --  31 9* 33 4* 37 9   HEMATOCRIT, ISTAT   --   --   --   --    < >  --   --   --    PLATELETS Thousands/uL 141* 160 208 238  --  216 197 171   NEUTROS PCT %  --   --   --   --   --  83* 79* 83*   MONOS PCT %  --   --   --   --   --  6 10 8   MONO PCT %  --   --   --  6  --   --   --   --     < > = values in this interval not displayed  Results from last 7 days   Lab Units 06/20/22 0459 06/19/22  0551 06/18/22  0424 06/17/22  1724 06/17/22  1307   SODIUM mmol/L 136 134* 134*   < >  --    POTASSIUM mmol/L 3 3* 4 0 4 5   < >  --    CHLORIDE mmol/L 99* 99* 102   < >  --    CO2 mmol/L 32 31 29   < >  --    CO2, I-STAT mmol/L  --   --   --   --  35*   BUN mg/dL 16 11 9   < >  --    CREATININE mg/dL 0 54* 0 60 0 64   < >  --    CALCIUM mg/dL 8 4 8 2* 8 4   < >  --    ALK PHOS U/L 56 63 61  --   --    ALT U/L 13 17 16  --   --    AST U/L 13 18 24  --   --    GLUCOSE, ISTAT mg/dl  --   --   --   --  137    < > = values in this interval not displayed  Results from last 7 days   Lab Units 06/20/22 0459 06/19/22  0551 06/18/22  0424   MAGNESIUM mg/dL 2 1 2 2 2 5     Results from last 7 days   Lab Units 06/20/22 0459 06/19/22  0551 06/18/22  0424   PHOSPHORUS mg/dL 2 5* 2 6* 2 7      Results from last 7 days   Lab Units 06/20/22 0459 06/19/22  0551 06/19/22  0032 06/15/22  2112 06/15/22  0449   INR   --   --   --   --  1 22*   PTT seconds 79* 70* 73*   < > 32    < > = values in this interval not displayed  Results from last 7 days   Lab Units 06/17/22  2323   LACTIC ACID mmol/L 2 0       Micro:  Lab Results   Component Value Date    BLOODCX No Growth at 24 hrs  06/18/2022    BLOODCX No Growth at 24 hrs  06/18/2022    BLOODCX No Growth After 5 Days   11/26/2021    URINECX >100,000 cfu/ml Mixed Contaminants X3 09/13/2016    WOUNDCULT (A) 11/26/2021     2+ Growth of Methicillin Resistant Staphylococcus aureus    SPUTUMCULTUR 2+ Growth of Gram Negative Jose Daniel (A) 06/18/2022 SPUTUMCULTUR 3+ Growth of Mixed Respiratory Cece 10/11/2016    SPUTUMCULTUR 4+ Growth of Mixed Respiratory Cece 09/29/2016       Resulted Labs    Pending Labs      Imaging Studies    Imaging:   XR chest portable ICU   Final Result by Alessio Zapata MD (06/19 0008)      1  Image quality limited but suspicious for pneumonia in the left lung base unchanged since the most recent prior study  Superimposed CHF would also be a consideration  2   Lines and tubes in satisfactory position  Workstation performed: [id number]         XR chest portable ICU   Final Result by Fozia Pereira MD (06/17 2011)      Questioned left basilar infiltrate  [facility] Heart shadow is enlarged and there is pulmonary vascular congestion concerning for CHF  Workstation performed: [id number]         XR chest portable ICU   Final Result by Michelle Hook MD (06/17 1043)      Pulmonary vascular congestion without a definitive consolidation or effusion  No pneumothorax  Workstation performed: [id number]           I have personally reviewed pertinent reports  Allergies:    Allergies   Allergen Reactions    Penicillins Hives         Medications:   Scheduled Meds:  Current Facility-Administered Medications   Medication Dose Route Frequency Provider Last Rate    acetylcysteine  3 mL Nebulization TID Andrae Reis MD      Adult TPN (CUSTOM BASE/CUSTOM ELECTROLYTE)   Intravenous Continuous TPN Flores Blanca PA-C 66 4 mL/hr at 06/19/22 2130    amiodarone  0 5 mg/min Intravenous Continuous ALESSANDRO Lemons-C 0 5 mg/min (06/20/22 0038)    budesonide  0 5 mg Nebulization Q12H Flores Blanca PA-C      cefepime  2,000 mg Intravenous Q8H Julianne Leomns Stopped (06/20/22 0313)    chlorhexidine  15 mL Mouth/Throat Q12H [facility] Chang Camargo MD      dexmedetomidine  0 1-0 7 mcg/kg/hr Intravenous Titrated Flores Blanca PA-C Stopped (06/18/22 1115)    heparin (porcine)  3-30 Units/kg/hr (Order-Specific) Intravenous Titrated Flores Blanca PA-C 26 Units/kg/hr (06/20/22 0753)    heparin (porcine)  10,000 Units Intravenous Q1H PRN Flores Blanca PA-C      heparin (porcine)  5,000 Units Intravenous Q1H PRN Flores Blanca PA-C      HYDROmorphone  0 5 mg Intravenous Q2H PRN Corinne Corolla, MD      Or   Whitney Salas HYDROmorphone  1 mg Intravenous Q2H PRN Corinne Corolla, MD      HYDROmorphone  1 mg Intravenous Q4H PRN Flores Blanca PA-C      insulin lispro  1-5 Units Subcutaneous Q6H [facility] Gaudencio Steward MD      ipratropium  0 5 mg Nebulization TID Gaudencio Steward MD      levalbuterol  1 25 mg Nebulization TID Gaudencio Steward MD      metroNIDAZOLE  500 mg Intravenous Q8H Julianne Lemons Stopped (06/20/22 0229)    norepinephrine  1-30 mcg/min Intravenous Titrated Flores Blanca PA-C Stopped (06/19/22 1100)    pantoprazole  40 mg Intravenous Q24H [facility] Amanda Woodward MD      potassium phosphate  30 mmol Intravenous Once Ephrilinh Christensen MD 30 mmol (06/20/22 0802)     Continuous Infusions:Adult TPN (CUSTOM BASE/CUSTOM ELECTROLYTE), , Last Rate: 66 4 mL/hr at 06/19/22 2130  amiodarone, 0 5 mg/min, Last Rate: 0 5 mg/min (06/20/22 0038)  dexmedetomidine, 0 1-0 7 mcg/kg/hr, Last Rate: Stopped (06/18/22 1115)  heparin (porcine), 3-30 Units/kg/hr (Order-Specific), Last Rate: 26 Units/kg/hr (06/20/22 0753)  norepinephrine, 1-30 mcg/min, Last Rate: Stopped (06/19/22 1100)      PRN Meds:  heparin (porcine), 10,000 Units, Q1H PRN  heparin (porcine), 5,000 Units, Q1H PRN  HYDROmorphone, 0 5 mg, Q2H PRN   Or  HYDROmorphone, 1 mg, Q2H PRN  HYDROmorphone, 1 mg, Q4H PRN        Counseling / Coordination of Care  Total Critical Care time spent 30 minutes excluding procedures, teaching and family updates  Code Status: Level 1 - Full Code     Portions of the record may have been created with voice recognition software    Occasional wrong word or "sound a like" substitutions may have occurred due to the inherent limitations of voice recognition software  Read the chart carefully and recognize, using context, where substitutions have occurred       Rosalio Esteban MD

## 2022-06-20 NOTE — RESPIRATORY THERAPY NOTE
RT Ventilator Management Note  Real Julien 47 y o  male MRN: [id number]  Unit/Bed#: [facility]U 05 Encounter: [id number]      Daily Screen         6/19/2022  0754 6/20/2022  0717          Patient safety screen outcome[de-identified] Passed Passed                Physical Exam:   Assessment Type: Assess only  General Appearance: Awake  Respiratory Pattern: Assisted, Symmetrical, Shallow  Chest Assessment: Chest expansion symmetrical  Bilateral Breath Sounds: Diminished      Resp Comments: (P) Pt found on pcv  Pt placed back on ASV mode  Pt sleeping and was fully supported by vent  PIP increased at [id number]  Pt given udn tx and sx for mod amt white secretions  PIP remains high  Pt changed back to PSV to encourage spont  ventilation   with MV 8 3  Will cont to monitor and attempt ASV again when pt is more awake

## 2022-06-20 NOTE — RESPIRATORY THERAPY NOTE
RT Ventilator Management Note  Gato Wu 47 y o  male MRN: [id number]  Unit/Bed#: [facility] 05 Encounter: [id number]      Daily Screen         6/19/2022  0754 6/20/2022  0717          Patient safety screen outcome[de-identified] Passed Passed                Physical Exam:   Assessment Type: Assess only  General Appearance: (P) Awake, Alert  Respiratory Pattern: (P) Assisted  Chest Assessment: (P) Chest expansion symmetrical  Bilateral Breath Sounds: (P) Diminished      Resp Comments: Pt extubated to bipap  No respiratory distress or stridor noted

## 2022-06-20 NOTE — PLAN OF CARE
Problem: Prexisting or High Potential for Compromised Skin Integrity  Goal: Skin integrity is maintained or improved  Description: INTERVENTIONS:  - Identify patients at risk for skin breakdown  - Assess and monitor skin integrity  - Assess and monitor nutrition and hydration status  - Monitor labs   - Assess for incontinence   - Turn and reposition patient  - Assist with mobility/ambulation  - Relieve pressure over bony prominences  - Avoid friction and shearing  - Provide appropriate hygiene as needed including keeping skin clean and dry  - Evaluate need for skin moisturizer/barrier cream  - Collaborate with interdisciplinary team   - Patient/family teaching  - Consider wound care consult   Outcome: Progressing     Problem: Potential for Falls  Goal: Patient will remain free of falls  Description: INTERVENTIONS:  - Educate patient/family on patient safety including physical limitations  - Instruct patient to call for assistance with activity   - Consult OT/PT to assist with strengthening/mobility   - Keep Call bell within reach  - Keep bed low and locked with side rails adjusted as appropriate  - Keep care items and personal belongings within reach  - Initiate and maintain comfort rounds  - Make Fall Risk Sign visible to staff  - Offer Toileting every 2 Hours, in advance of need  - Initiate/Maintain bed alarm  - Obtain necessary fall risk management equipment: bed alarm  - Apply yellow socks and bracelet for high fall risk patients  - Consider moving patient to room near nurses station  Outcome: Progressing     Problem: MOBILITY - ADULT  Goal: Maintain or return to baseline ADL function  Description: INTERVENTIONS:  -  Assess patient's ability to carry out ADLs; assess patient's baseline for ADL function and identify physical deficits which impact ability to perform ADLs (bathing, care of mouth/teeth, toileting, grooming, dressing, etc )  - Assess/evaluate cause of self-care deficits   - Assess range of motion  - Assess patient's mobility; develop plan if impaired  - Assess patient's need for assistive devices and provide as appropriate  - Encourage maximum independence but intervene and supervise when necessary  - Involve family in performance of ADLs  - Assess for home care needs following discharge   - Consider OT consult to assist with ADL evaluation and planning for discharge  - Provide patient education as appropriate  Outcome: Progressing  Goal: Maintains/Returns to pre admission functional level  Description: INTERVENTIONS:  - Perform BMAT or MOVE assessment daily    - Set and communicate daily mobility goal to care team and patient/family/caregiver  - Collaborate with rehabilitation services on mobility goals if consulted  - Perform Range of Motion 3 times a day  - Reposition patient every 2 hours  - Dangle patient 3 times a day  - Stand patient 3 times a day  - Ambulate patient 3 times a day  - Out of bed to chair 3 times a day   - Out of bed for meals 3 times a day  - Out of bed for toileting  - Record patient progress and toleration of activity level   Outcome: Progressing     Problem: Nutrition/Hydration-ADULT  Goal: Nutrient/Hydration intake appropriate for improving, restoring or maintaining nutritional needs  Description: Monitor and assess patient's nutrition/hydration status for malnutrition  Collaborate with interdisciplinary team and initiate plan and interventions as ordered  Monitor patient's weight and dietary intake as ordered or per policy  Utilize nutrition screening tool and intervene as necessary  Determine patient's food preferences and provide high-protein, high-caloric foods as appropriate       INTERVENTIONS:  - Monitor oral intake, urinary output, labs, and treatment plans  - Assess nutrition and hydration status and recommend course of action  - Evaluate amount of meals eaten  - Assist patient with eating if necessary   - Allow adequate time for meals  - Recommend/ encourage appropriate diets, oral nutritional supplements, and vitamin/mineral supplements  - Order, calculate, and assess calorie counts as needed  - Recommend, monitor, and adjust tube feedings and TPN/PPN based on assessed needs  - Assess need for intravenous fluids  - Provide specific nutrition/hydration education as appropriate  - Include patient/family/caregiver in decisions related to nutrition  Outcome: Progressing

## 2022-06-20 NOTE — QUICK NOTE
Wound VAC placed bedside    Negative pressure wound VAC    6 white sponges, 1 black sponge    125 mmHg continuous suction    VAC placed at 10PM on 6/19/2022

## 2022-06-20 NOTE — PROGRESS NOTES
Progress Note - General Surgery   Cesar Hinton 47 y o  male MRN: [id number]  Unit/Bed#: [facility]U 05 Encounter: [id number]    Assessment:  47 y o  male with super morbid obesity her with BMI of 68, presented with SBO is secondary to a chronic incarcerated ventral hernia, is s/p ex lap with extensive HARVEY on 06/14 with VAC placement, and is now  3 Days Post-Op s/p Procedure(s) (LRB):  ABDOMINAL WASHOUT, REPAIR OF SEROSAL TEARS,BRIDING VICRYL MESH, PARTIAL CLOSURE, APPLICATION OF WOUND VAC (N/A)  Wound VAC was placed intraoperatively given his elevated peak pressures with attempt to close  Abdominal wound VAC replaced bedside last night    PS 18/10 @ 40%, peak pressure 30  No pressors    Plan:  Ok to start trickle tube feeds  PRN nausea and pain control  Wean vent as tolerated  VAC changes MWF - next change Wednesday  Continue amio drip  Heparin gtt continued  Accurate IO's    Subjective/Objective   Chief Complaint:     Subjective: no acute events overnight  Has weaned off pressors, doing well    Objective:     Blood pressure 94/56, pulse 104, temperature 98 96 °F (37 2 °C), resp  rate 21, weight (!) 234 kg (516 lb 5 1 oz), SpO2 97 %  ,Body mass index is 72 01 kg/m²  Intake/Output Summary (Last 24 hours) at 6/20/2022 0519  Last data filed at 6/20/2022 0313  Gross per 24 hour   Intake 3739 74 ml   Output 2670 ml   Net 1069 74 ml       Invasive Devices  Report    Peripherally Inserted Central Catheter Line  Duration           PICC Line 47/76/04 Right Basilic 6 days          Central Venous Catheter Line  Duration           CVC Central Lines 06/14/22 Triple 5 days          Drain  Duration           NG/OG/Enteral Tube Right nare 8 days    Urethral Catheter Latex 16 Fr  5 days          Airway  Duration           ETT  Hi-Lo; Cuffed;Oral 8 mm 5 days                Physical Exam: General: Awake and alert  Head: normocephalic, atraumatic  Neck: supple, trachea midline  Respiratory: BS b/l  Abdomen: Soft, morbidly obese, abdomen open with VAC in place  Heart: RRR, S1s2  Ext: Warm no cyanosis       Lab, Imaging and other studies:  I have personally reviewed pertinent lab results    , CBC:   Lab Results   Component Value Date    WBC 6 26 06/20/2022    HGB 7 8 (L) 06/20/2022    HCT 25 3 (L) 06/20/2022    MCV 96 06/20/2022     (L) 06/20/2022    MCH 29 7 06/20/2022    MCHC 30 8 (L) 06/20/2022    RDW 14 6 06/20/2022    MPV 10 8 06/20/2022   , CMP:   Lab Results   Component Value Date    SODIUM 134 (L) 06/19/2022    K 4 0 06/19/2022    CL 99 (L) 06/19/2022    CO2 31 06/19/2022    BUN 11 06/19/2022    CREATININE 0 60 06/19/2022    CALCIUM 8 2 (L) 06/19/2022    AST 18 06/19/2022    ALT 17 06/19/2022    ALKPHOS 63 06/19/2022    EGFR 116 06/19/2022     VTE Pharmacologic Prophylaxis: Heparin  VTE Mechanical Prophylaxis: sequential compression device

## 2022-06-21 ENCOUNTER — APPOINTMENT (INPATIENT)
Dept: RADIOLOGY | Facility: HOSPITAL | Age: 52
DRG: 230 | End: 2022-06-21
Payer: COMMERCIAL

## 2022-06-21 LAB
ANION GAP SERPL CALCULATED.3IONS-SCNC: 5 MMOL/L (ref 4–13)
APTT PPP: 60 SECONDS (ref 23–37)
ARTERIAL PATENCY WRIST A: YES
BASE EXCESS BLDA CALC-SCNC: 8.5 MMOL/L
BASOPHILS # BLD AUTO: 0.04 THOUSANDS/ΜL (ref 0–0.1)
BASOPHILS NFR BLD AUTO: 1 % (ref 0–1)
BUN SERPL-MCNC: 14 MG/DL (ref 5–25)
CALCIUM SERPL-MCNC: 8.3 MG/DL (ref 8.3–10.1)
CHLORIDE SERPL-SCNC: 99 MMOL/L (ref 100–108)
CO2 SERPL-SCNC: 33 MMOL/L (ref 21–32)
CREAT SERPL-MCNC: 0.5 MG/DL (ref 0.6–1.3)
EOSINOPHIL # BLD AUTO: 0.41 THOUSAND/ΜL (ref 0–0.61)
EOSINOPHIL NFR BLD AUTO: 6 % (ref 0–6)
ERYTHROCYTE [DISTWIDTH] IN BLOOD BY AUTOMATED COUNT: 14.6 % (ref 11.6–15.1)
GFR SERPL CREATININE-BSD FRML MDRD: 125 ML/MIN/1.73SQ M
GLUCOSE SERPL-MCNC: 129 MG/DL (ref 65–140)
GLUCOSE SERPL-MCNC: 141 MG/DL (ref 65–140)
GLUCOSE SERPL-MCNC: 151 MG/DL (ref 65–140)
GLUCOSE SERPL-MCNC: 154 MG/DL (ref 65–140)
GLUCOSE SERPL-MCNC: 168 MG/DL (ref 65–140)
GLUCOSE SERPL-MCNC: 175 MG/DL (ref 65–140)
GLUCOSE SERPL-MCNC: 183 MG/DL (ref 65–140)
HCO3 BLDA-SCNC: 34.2 MMOL/L (ref 22–28)
HCT VFR BLD AUTO: 27.2 % (ref 36.5–49.3)
HGB BLD-MCNC: 8.5 G/DL (ref 12–17)
IMM GRANULOCYTES # BLD AUTO: 0.13 THOUSAND/UL (ref 0–0.2)
IMM GRANULOCYTES NFR BLD AUTO: 2 % (ref 0–2)
LYMPHOCYTES # BLD AUTO: 0.91 THOUSANDS/ΜL (ref 0.6–4.47)
LYMPHOCYTES NFR BLD AUTO: 12 % (ref 14–44)
MAGNESIUM SERPL-MCNC: 2.2 MG/DL (ref 1.6–2.6)
MCH RBC QN AUTO: 29.9 PG (ref 26.8–34.3)
MCHC RBC AUTO-ENTMCNC: 31.3 G/DL (ref 31.4–37.4)
MCV RBC AUTO: 96 FL (ref 82–98)
MONOCYTES # BLD AUTO: 0.7 THOUSAND/ΜL (ref 0.17–1.22)
MONOCYTES NFR BLD AUTO: 9 % (ref 4–12)
NASAL CANNULA: 2
NEUTROPHILS # BLD AUTO: 5.28 THOUSANDS/ΜL (ref 1.85–7.62)
NEUTS SEG NFR BLD AUTO: 70 % (ref 43–75)
NRBC BLD AUTO-RTO: 0 /100 WBCS
O2 CT BLDA-SCNC: 12.3 ML/DL (ref 16–23)
OXYHGB MFR BLDA: 90.4 % (ref 94–97)
PCO2 BLDA: 54 MM HG (ref 36–44)
PH BLDA: 7.42 [PH] (ref 7.35–7.45)
PHOSPHATE SERPL-MCNC: 3.2 MG/DL (ref 2.7–4.5)
PLATELET # BLD AUTO: 170 THOUSANDS/UL (ref 149–390)
PMV BLD AUTO: 11.1 FL (ref 8.9–12.7)
PO2 BLDA: 65.3 MM HG (ref 75–129)
POTASSIUM SERPL-SCNC: 3.5 MMOL/L (ref 3.5–5.3)
RBC # BLD AUTO: 2.84 MILLION/UL (ref 3.88–5.62)
SODIUM SERPL-SCNC: 137 MMOL/L (ref 136–145)
SPECIMEN SOURCE: ABNORMAL
WBC # BLD AUTO: 7.47 THOUSAND/UL (ref 4.31–10.16)

## 2022-06-21 PROCEDURE — 94760 N-INVAS EAR/PLS OXIMETRY 1: CPT | Performed by: SOCIAL WORKER

## 2022-06-21 PROCEDURE — 99233 SBSQ HOSP IP/OBS HIGH 50: CPT | Performed by: SURGERY

## 2022-06-21 PROCEDURE — 99223 1ST HOSP IP/OBS HIGH 75: CPT | Performed by: INTERNAL MEDICINE

## 2022-06-21 PROCEDURE — 74018 RADEX ABDOMEN 1 VIEW: CPT

## 2022-06-21 PROCEDURE — 99024 POSTOP FOLLOW-UP VISIT: CPT | Performed by: SURGERY

## 2022-06-21 PROCEDURE — 92610 EVALUATE SWALLOWING FUNCTION: CPT

## 2022-06-21 PROCEDURE — 94640 AIRWAY INHALATION TREATMENT: CPT | Performed by: SOCIAL WORKER

## 2022-06-21 PROCEDURE — 84100 ASSAY OF PHOSPHORUS: CPT | Performed by: STUDENT IN AN ORGANIZED HEALTH CARE EDUCATION/TRAINING PROGRAM

## 2022-06-21 PROCEDURE — 82948 REAGENT STRIP/BLOOD GLUCOSE: CPT

## 2022-06-21 PROCEDURE — 83735 ASSAY OF MAGNESIUM: CPT | Performed by: STUDENT IN AN ORGANIZED HEALTH CARE EDUCATION/TRAINING PROGRAM

## 2022-06-21 PROCEDURE — 36600 WITHDRAWAL OF ARTERIAL BLOOD: CPT

## 2022-06-21 PROCEDURE — 85730 THROMBOPLASTIN TIME PARTIAL: CPT | Performed by: SURGERY

## 2022-06-21 PROCEDURE — 87081 CULTURE SCREEN ONLY: CPT | Performed by: SURGERY

## 2022-06-21 PROCEDURE — 71045 X-RAY EXAM CHEST 1 VIEW: CPT

## 2022-06-21 PROCEDURE — 82805 BLOOD GASES W/O2 SATURATION: CPT | Performed by: EMERGENCY MEDICINE

## 2022-06-21 PROCEDURE — 85025 COMPLETE CBC W/AUTO DIFF WBC: CPT | Performed by: STUDENT IN AN ORGANIZED HEALTH CARE EDUCATION/TRAINING PROGRAM

## 2022-06-21 PROCEDURE — NC001 PR NO CHARGE: Performed by: REGISTERED NURSE

## 2022-06-21 PROCEDURE — C9113 INJ PANTOPRAZOLE SODIUM, VIA: HCPCS | Performed by: SURGERY

## 2022-06-21 PROCEDURE — 94760 N-INVAS EAR/PLS OXIMETRY 1: CPT

## 2022-06-21 PROCEDURE — 80048 BASIC METABOLIC PNL TOTAL CA: CPT | Performed by: STUDENT IN AN ORGANIZED HEALTH CARE EDUCATION/TRAINING PROGRAM

## 2022-06-21 RX ORDER — METOPROLOL TARTRATE 50 MG/1
100 TABLET, FILM COATED ORAL EVERY 12 HOURS SCHEDULED
Status: DISCONTINUED | OUTPATIENT
Start: 2022-06-21 | End: 2022-06-21

## 2022-06-21 RX ORDER — METOCLOPRAMIDE HYDROCHLORIDE 5 MG/ML
10 INJECTION INTRAMUSCULAR; INTRAVENOUS EVERY 6 HOURS PRN
Status: DISCONTINUED | OUTPATIENT
Start: 2022-06-21 | End: 2022-06-24

## 2022-06-21 RX ORDER — POLYETHYLENE GLYCOL 3350 17 G/17G
17 POWDER, FOR SOLUTION ORAL DAILY
Status: DISCONTINUED | OUTPATIENT
Start: 2022-06-21 | End: 2022-07-18

## 2022-06-21 RX ORDER — SPIRONOLACTONE 50 MG/1
50 TABLET, FILM COATED ORAL DAILY
Status: DISCONTINUED | OUTPATIENT
Start: 2022-06-21 | End: 2022-06-21

## 2022-06-21 RX ORDER — BISACODYL 10 MG
10 SUPPOSITORY, RECTAL RECTAL DAILY
Status: DISCONTINUED | OUTPATIENT
Start: 2022-06-21 | End: 2022-07-18

## 2022-06-21 RX ORDER — AMIODARONE HYDROCHLORIDE 200 MG/1
200 TABLET ORAL
Status: DISCONTINUED | OUTPATIENT
Start: 2022-06-21 | End: 2022-06-24

## 2022-06-21 RX ORDER — DIGOXIN 0.05 MG/ML
125 SOLUTION ORAL DAILY
Status: DISCONTINUED | OUTPATIENT
Start: 2022-06-22 | End: 2022-06-22

## 2022-06-21 RX ORDER — ACETAMINOPHEN 160 MG/5ML
650 SUSPENSION, ORAL (FINAL DOSE FORM) ORAL EVERY 6 HOURS
Status: DISCONTINUED | OUTPATIENT
Start: 2022-06-21 | End: 2022-07-05

## 2022-06-21 RX ORDER — BUDESONIDE AND FORMOTEROL FUMARATE DIHYDRATE 160; 4.5 UG/1; UG/1
2 AEROSOL RESPIRATORY (INHALATION)
Status: DISCONTINUED | OUTPATIENT
Start: 2022-06-21 | End: 2022-07-19 | Stop reason: HOSPADM

## 2022-06-21 RX ORDER — AMIODARONE HYDROCHLORIDE 200 MG/1
200 TABLET ORAL
Status: DISCONTINUED | OUTPATIENT
Start: 2022-06-21 | End: 2022-06-21

## 2022-06-21 RX ORDER — LEVOTHYROXINE SODIUM 0.03 MG/1
25 TABLET ORAL
Status: DISCONTINUED | OUTPATIENT
Start: 2022-06-21 | End: 2022-07-19 | Stop reason: HOSPADM

## 2022-06-21 RX ORDER — TORSEMIDE 20 MG/1
40 TABLET ORAL 2 TIMES DAILY
Refills: 0 | Status: DISCONTINUED | OUTPATIENT
Start: 2022-06-21 | End: 2022-07-08

## 2022-06-21 RX ORDER — BISACODYL 10 MG
10 SUPPOSITORY, RECTAL RECTAL DAILY PRN
Status: DISCONTINUED | OUTPATIENT
Start: 2022-06-21 | End: 2022-06-21

## 2022-06-21 RX ORDER — GABAPENTIN 250 MG/5ML
300 SOLUTION ORAL 3 TIMES DAILY
Status: DISCONTINUED | OUTPATIENT
Start: 2022-06-21 | End: 2022-07-19 | Stop reason: HOSPADM

## 2022-06-21 RX ORDER — METHOCARBAMOL 500 MG/1
500 TABLET, FILM COATED ORAL EVERY 6 HOURS PRN
Status: DISCONTINUED | OUTPATIENT
Start: 2022-06-21 | End: 2022-07-05

## 2022-06-21 RX ADMIN — OXYCODONE HYDROCHLORIDE 10 MG: 5 SOLUTION ORAL at 13:24

## 2022-06-21 RX ADMIN — SENNOSIDES AND DOCUSATE SODIUM 1 TABLET: 8.6; 5 TABLET ORAL at 17:48

## 2022-06-21 RX ADMIN — HYDROMORPHONE HYDROCHLORIDE 1 MG: 1 INJECTION, SOLUTION INTRAMUSCULAR; INTRAVENOUS; SUBCUTANEOUS at 21:06

## 2022-06-21 RX ADMIN — CEFTRIAXONE 2000 MG: 2 INJECTION, POWDER, FOR SOLUTION INTRAMUSCULAR; INTRAVENOUS at 17:49

## 2022-06-21 RX ADMIN — BISACODYL 10 MG: 10 SUPPOSITORY RECTAL at 13:34

## 2022-06-21 RX ADMIN — GABAPENTIN 300 MG: 250 SOLUTION ORAL at 12:48

## 2022-06-21 RX ADMIN — METHOCARBAMOL TABLETS 500 MG: 500 TABLET, COATED ORAL at 13:24

## 2022-06-21 RX ADMIN — LEVALBUTEROL HYDROCHLORIDE 1.25 MG: 1.25 SOLUTION, CONCENTRATE RESPIRATORY (INHALATION) at 13:38

## 2022-06-21 RX ADMIN — IPRATROPIUM BROMIDE 0.5 MG: 0.5 SOLUTION RESPIRATORY (INHALATION) at 07:16

## 2022-06-21 RX ADMIN — AMIODARONE HYDROCHLORIDE 200 MG: 200 TABLET ORAL at 12:39

## 2022-06-21 RX ADMIN — ACETYLCYSTEINE 600 MG: 200 SOLUTION ORAL; RESPIRATORY (INHALATION) at 13:38

## 2022-06-21 RX ADMIN — Medication 20 MG: at 13:11

## 2022-06-21 RX ADMIN — FUROSEMIDE 40 MG: 10 INJECTION, SOLUTION INTRAMUSCULAR; INTRAVENOUS at 08:15

## 2022-06-21 RX ADMIN — LEVALBUTEROL HYDROCHLORIDE 1.25 MG: 1.25 SOLUTION, CONCENTRATE RESPIRATORY (INHALATION) at 07:16

## 2022-06-21 RX ADMIN — TORSEMIDE 40 MG: 20 TABLET ORAL at 13:11

## 2022-06-21 RX ADMIN — IPRATROPIUM BROMIDE 0.5 MG: 0.5 SOLUTION RESPIRATORY (INHALATION) at 13:38

## 2022-06-21 RX ADMIN — CHLORHEXIDINE GLUCONATE 15 ML: 1.2 SOLUTION ORAL at 08:15

## 2022-06-21 RX ADMIN — OXYCODONE HYDROCHLORIDE 10 MG: 5 SOLUTION ORAL at 17:48

## 2022-06-21 RX ADMIN — IPRATROPIUM BROMIDE 0.5 MG: 0.5 SOLUTION RESPIRATORY (INHALATION) at 21:11

## 2022-06-21 RX ADMIN — PANTOPRAZOLE SODIUM 40 MG: 40 INJECTION, POWDER, FOR SOLUTION INTRAVENOUS at 08:15

## 2022-06-21 RX ADMIN — HEPARIN SODIUM 26 UNITS/KG/HR: 10000 INJECTION, SOLUTION INTRAVENOUS at 07:25

## 2022-06-21 RX ADMIN — ACETYLCYSTEINE 600 MG: 200 SOLUTION ORAL; RESPIRATORY (INHALATION) at 07:16

## 2022-06-21 RX ADMIN — INSULIN LISPRO 1 UNITS: 100 INJECTION, SOLUTION INTRAVENOUS; SUBCUTANEOUS at 12:49

## 2022-06-21 RX ADMIN — OXYCODONE HYDROCHLORIDE 10 MG: 5 SOLUTION ORAL at 07:49

## 2022-06-21 RX ADMIN — BUDESONIDE 0.5 MG: 0.5 INHALANT ORAL at 07:16

## 2022-06-21 RX ADMIN — ACETAMINOPHEN 649.6 MG: 650 SUSPENSION ORAL at 12:39

## 2022-06-21 RX ADMIN — HYDROMORPHONE HYDROCHLORIDE 1 MG: 1 INJECTION, SOLUTION INTRAMUSCULAR; INTRAVENOUS; SUBCUTANEOUS at 15:43

## 2022-06-21 RX ADMIN — LEVALBUTEROL HYDROCHLORIDE 1.25 MG: 1.25 SOLUTION, CONCENTRATE RESPIRATORY (INHALATION) at 21:11

## 2022-06-21 RX ADMIN — HEPARIN SODIUM 26 UNITS/KG/HR: 10000 INJECTION, SOLUTION INTRAVENOUS at 21:38

## 2022-06-21 RX ADMIN — SENNOSIDES AND DOCUSATE SODIUM 1 TABLET: 8.6; 5 TABLET ORAL at 08:15

## 2022-06-21 RX ADMIN — AMIODARONE HYDROCHLORIDE 200 MG: 200 TABLET ORAL at 17:48

## 2022-06-21 RX ADMIN — LEVOTHYROXINE SODIUM 25 MCG: 25 TABLET ORAL at 12:39

## 2022-06-21 RX ADMIN — SPIRONOLACTONE 50 MG: 50 TABLET ORAL at 09:46

## 2022-06-21 RX ADMIN — DIGOXIN 250 MCG: 0.05 SOLUTION ORAL at 09:46

## 2022-06-21 RX ADMIN — AMIODARONE HYDROCHLORIDE 200 MG: 200 TABLET ORAL at 06:35

## 2022-06-21 RX ADMIN — INSULIN LISPRO 1 UNITS: 100 INJECTION, SOLUTION INTRAVENOUS; SUBCUTANEOUS at 05:21

## 2022-06-21 RX ADMIN — POLYETHYLENE GLYCOL 3350 17 G: 17 POWDER, FOR SOLUTION ORAL at 12:53

## 2022-06-21 RX ADMIN — METOCLOPRAMIDE HYDROCHLORIDE 10 MG: 5 INJECTION INTRAMUSCULAR; INTRAVENOUS at 22:28

## 2022-06-21 RX ADMIN — ACETAMINOPHEN 650 MG: 650 SUSPENSION ORAL at 17:48

## 2022-06-21 RX ADMIN — INSULIN LISPRO 1 UNITS: 100 INJECTION, SOLUTION INTRAVENOUS; SUBCUTANEOUS at 17:48

## 2022-06-21 RX ADMIN — Medication: at 21:16

## 2022-06-21 RX ADMIN — GABAPENTIN 300 MG: 250 SOLUTION ORAL at 17:48

## 2022-06-21 RX ADMIN — HEPARIN SODIUM 26 UNITS/KG/HR: 10000 INJECTION, SOLUTION INTRAVENOUS at 14:00

## 2022-06-21 NOTE — PROGRESS NOTES
Progress Note - General Surgery   Cathi Garza 47 y o  male MRN: [id number]  Unit/Bed#: [facility]U 05 Encounter: [id number]    Assessment:  48yoM p/w SBO 2/2 chronic incarcerated ventral hernia now POD6 s/p ex lap, HARVEY, also POD3 s/p abdominal washout, repair of serosal tears, bridging vicryl mesh placement, wound vac placement    Extubated to BiPAP yesterday 6/20    Tachy to low 100s, afebrile, off pressors  Labs pending    UOP 3755    Vac 325    Plan:  - trickle tube feeds started, will advance to goal, remains on TPN saw while advancing  - heparin drip  - amnio drip  - BiPAP, wean as tolerated  - VAC changes MWF  - pain control  - appreciate ICU care    Subjective/Objective   Subjective:   No acute events,     Objective:     Blood pressure 99/59, pulse (!) 106, temperature 99 9 °F (37 7 °C), temperature source Axillary, resp  rate (!) 38, height 5' 11" (1 803 m), weight (!) 233 kg (513 lb 10 8 oz), SpO2 (!) 64 %  ,Body mass index is 71 64 kg/m²  Intake/Output Summary (Last 24 hours) at 6/21/2022 0552  Last data filed at 6/21/2022 0401  Gross per 24 hour   Intake 3497 33 ml   Output 4880 ml   Net -1382 67 ml       Invasive Devices  Report    Peripherally Inserted Central Catheter Line  Duration           PICC Line 36/38/17 Right Basilic 7 days          Central Venous Catheter Line  Duration           CVC Central Lines 06/14/22 Triple 6 days          Drain  Duration           NG/OG/Enteral Tube Right nare 9 days    Urethral Catheter Latex 16 Fr  6 days                Physical Exam:   General: NAD  Skin: Warm, dry, anicteric  HEENT: Normocephalic, atraumatic  CV: RRR, no m/r/g  Pulm: CTA b/l, no inc WOB  Abd: Soft, ND/NT, VAC in place  MSK: Symmetric, no edema, no tenderness, no deformity    Lab, Imaging and other studies:  I have personally reviewed pertinent lab results    , CBC:   Lab Results   Component Value Date    WBC 7 47 06/21/2022    HGB 8 5 (L) 06/21/2022    HCT 27 2 (L) 06/21/2022    MCV 96 06/21/2022     06/21/2022    MCH 29 9 06/21/2022    MCHC 31 3 (L) 06/21/2022    RDW 14 6 06/21/2022    MPV 11 1 06/21/2022    NRBC 0 06/21/2022   , CMP: No results found for: SODIUM, K, CL, CO2, ANIONGAP, BUN, CREATININE, GLUCOSE, CALCIUM, AST, ALT, ALKPHOS, PROT, BILITOT, EGFR  VTE Pharmacologic Prophylaxis: Sequential compression device (Venodyne)  and Heparin  VTE Mechanical Prophylaxis: sequential compression device

## 2022-06-21 NOTE — SPEECH THERAPY NOTE
Speech Language/Pathology  Speech/Language Pathology  Assessment    Patient Name: Madan KIMBALLOSJulianoI Date: 6/21/2022     Problem List  Principal Problem:    SBO  Active Problems:    Atrial fibrillation with RVR (HCC)    COPD (chronic obstructive pulmonary disease) (HCC)    Acute respiratory failure (HCC)    Past Medical History  Past Medical History:   Diagnosis Date    Afib ([facility] )     Anemia     Anxiety     [facility]     Cardiac disease     Cellulitis     COPD [facility]chronic obstructive pulmonary disease) ([facility] )     Depression     Diabetes mellitus ([facility] )     DVT (deep venous thrombosis) (HCC)     GERD (gastroesophageal reflux disease)     HBP (high blood pressure)     Heart failure (HCC)     Hx of blood clots     Hypertension     Hypokalemia     Hypothyroid     Obesity     Psychiatric disorder      Past Surgical History  Past Surgical History:   Procedure Laterality Date    ABDOMINAL HERNIA REPAIR  05/2019    CHOLECYSTECTOMY      Lap    GASTRECTOMY SLEEVE LAPAROSCOPIC  08/2018    INCISION AND DRAINAGE OF WOUND Bilateral 12/01/2021    Procedure: INCISION AND DRAINAGE (I&D) HEAD/FACE;  Surgeon: Sri Davis MD;  Location:  MAIN OR;  Service: General    IR PICC PLACEMENT DOUBLE LUMEN  06/13/2022    IVC FILTER INSERTION  2018    KNEE SURGERY Right     open wound, injury     LAPAROTOMY N/A 6/14/2022    Procedure: LAPAROTOMY EXPLORATORY, EXTENSIVE LYSIS OF ADHESIONS, APPLICATION OF ABTHERA WOUND VAC;  Surgeon: Mayank Narvaez MD;  Location: [address] [place] [address];  Service: General    LAPAROTOMY N/A 6/15/2022    Procedure: LAPAROTOMY EXPLORATORY WITH REPAIR OF SEROSAL INJURY;  Surgeon: Jeremy Mejia MD;  Location:  MAIN OR;  Service: General    LEG SURGERY Right 2009    US GUIDED VASCULAR ACCESS  08/06/2018    VAC DRESSING APPLICATION N/A 5/40/6570    Procedure: CHANGE DRESSING/VAC ABDOMEN;  Surgeon: Jeremy Mejia MD;  [facility]: [facility];  Service: General    VAC DRESSING APPLICATION N/A 9/36/4271    Procedure: ABDOMINAL WASHOUT, REPAIR OF SEROSAL TEARS,BRIDING VICRYL MESH, PARTIAL CLOSURE, APPLICATION OF WOUND VAC;  Surgeon: Smiley Herrera DO;  Location: [facility];  Service: General        Bedside Swallow Evaluation:    Summary:  Pt presents w/ s/s suggestive of mod-severe oropharyngeal dysphagia  Pt w/ mod prolonged and uncoordinated mastication and bolus formation  Pt w/ fair bolus control for all consistencies and WFL transfer for puree and liquids  No oral residue noted  Swallow initiation suspected fair  Laryngeal rise fair to palpation  Pt w/ s/s aspiration (throat clear and cough) w/ all consistencies today, not improved w/ modifications or positioning  Pt appears w/ high risk for aspiration at this time, suspect difficulty coordinating breathing/swallow  Pt educated on recommendation and rationale for NPO status  Pt verbalized understanding and agreeable  Pt also educated on possible VBS, if indicated  Pt verbalized understanding and agreeable  Recommendations:  Diet: NPO, continue TF   Liquid: NPO  Meds: NPO  Supervision:-  Positioning:-  Strategies: -  Oral care: 3-4x daily  Aspiration precautions  Reflux precautions    Therapy Prognosis: Fair  Prognosis considerations: Age, respiratory status, current medical   Frequency: As able     Consider consult w/:  Rehab  Nutrition    H&P/Admit info/ pertinent provider notes: (PMH noted above)  HPI: Ritesh Fitzgerald a 47 y  o  male who presents with open abdomen after surgery for SBO  Patient was initially seen at [facility] for abdominal pain and constipation  CT showed SBO and patient was transferred to [facility] for surgery  Patient underwent ex-lap and adhesions were removed  Patient had a large fascial defect with loss of domain due to his morbid obesity which required patient to be placed on wound VAC   Patient was transferred to the ICU and later decision was made to transfer to [facility] for further management and care on 6/14    On 6/15 pt returned to OR for second look exploratory laparotomy with repair of serosal injury and change dressing/vac abdomen and initiation of DPR     6/19 Significant 24 hour events and current clinical status: Total of 80 lasix given over last 24hrs with 3L UO, but still net positive  Unable ton quantify fluid loss from surgical wound and insensible loss  Vasopressors weaned significantly  PTT finally within therapeutic range but anti-Xa level and ATIII levels found to be low  HR now under control s/p Digoxin load  When seen pt awake, alert and follows commands  Breathing comfortably on 18/10 pressure support, sats in 90's  Planned bedside vac placement by Gen Surg today        This patient remains critically ill due to high risk for respiratory and/or hemodynamic compromise, and requires ongoing ICU management  6/18 Significant 24 hour events and current clinical status:   Pt remains in shock, presumed distributive but etiology unclear, potentially septic from an abdominal source  Pt awake, alert, following commands when seen  Vasopressor requirement significantly decreased this am when seen on rounds after suddenly increasing overnight wihtout clear explanation  POC echo technically diffciult due to body habitus but revealed hyperdynamic cardiac function which appeared grossly normal (similar to images obtained on previou formal echo); no pericardial effusion, RV size/function grossly normal   CVP transduced and measured to be 9mmHg  ScvO2 WNL  A-Fib persistently with RVR despite Amio gtt  Difficulty in achieveing therapeutic PTT (treatment for pt's A-fib), likely due to body habitus  Pt given 20mg lasix this am prior to rounds for low UO and diuretic dependence at baseline   Axillary A-line placed successfully while on rounds and correlated with cuff which was on pt's right wrist  However, due to body habitus and catheter length, catheter was accidentally pulled out and a-line stopped functioning  A-line removed and pressure held for 10 minutes         This patient remains critically ill due to high risk for respiratory and/or hemodynamic compromise, and requires ongoing ICU management  6/17 24hr events: Overnight, pt was tachycardic with rates up to 150s and given his afib was started on amnio drip  He was given lopressor 5 while waiting on amnio to be delivered to unit  Additionally, he continued requiring vasopressor support for borderline low MAPs  Plan to return to OR today for possible closure  Intubated 6/14-6/20    Special Studies:  CXR 6/18: 1  Image quality limited but suspicious for pneumonia in the left lung base unchanged since the most recent prior study  Superimposed CHF would also be a consideration  2   Lines and tubes in satisfactory position  CXR 6/17: Questioned left basilar infiltrate  Heart shadow is enlarged and there is pulmonary vascular congestion concerning for CHF  Previous VBS: No      Goal(s):  Pt will tolerate least restrictive diet w/out s/s aspiration or oral/pharyngeal difficulties  Patient's goal: "water"     Did the pt report pain? No   If yes, was nursing notified/was it addressed? N/A    Reason for consult:  R/o aspiration  Determine safest and least restrictive diet  respiratory compromise  Prolonged intubation  C/o solid food dysphagia    Precautions:  Contact  Fall     Food allergies: None    Current diet: NPO, NG     Premorbid diet::Regular w/ thin    O2 requirement: 2 L O2    Voice/Speech: Weak voice, low tone but clear quality     Social: SNF    Follows commands:   Yes                          Cognitive Status:  Awake, alert    Oral [facility] exam:  Dentition: limited dentition   Labial strength and ROM: WFL   Lingual strength and ROM: WFL   Mandibular strength and ROM: WFL   Velum: symmetrical   Secretion management: WFL   Volitional cough: strong    Items administered:  Puree, soft solid, honey thick liquid, nectar thick liquid, thin liquids, (pt refused hard solid)  Liquids were taken by straw/cup  Oral stage:  Lip closure: WFL   Mastication: mild-mod prolonged and uncoordinated w/ soft solid   Bolus formation: uncoordinated lingual pumping   Bolus control: fair   Transfer: wfl for puree and liquids   Oral residue:  No   Pocketing: no     Pharyngeal stage:  Swallow promptness: suspected fairly prompt   Laryngeal rise: fair to palpation   Wet voice: no   Throat clear: w/ thin   Cough: w/ thin, nectar thick, honey thick, puree, skylar cracker   Cough noted immediately following swallows  Secondary swallows:  No   Audible swallows: no     Esophageal stage:  H/o GERD      Results d/w:  Pt, nursing, PA

## 2022-06-21 NOTE — PROGRESS NOTES
Progress Note - Critical Care   Gina Levy 47 y o  male MRN: [id number]  Unit/Bed#: MICU 05 Encounter: [id number]    SUBJECTIVE:  When asked if patient is has pain under controlled patient shakes his head    HPI/24HR Event:  No acute events overnight     ROS  Unable to assess due to patient being intubated       Assessment & Plan:   -Neuro:    - Analgesia/sedation              - prn oxycodone and dilaudid q4   Dx: Depression               - Resume home wellbutrin when able   - Seroquel QH  - Delirium ppx: CAM-ICU, sleep hygiene        CV:   - Dx: Shock                     - Distributive vs septic                           - Bedside ECHO revealing similar findings as previous ECHO              - Now off pressors               - Stress dose steroids discontinued               - Cefepime and flagyl transitioned to ceftriaxone 6/20  - Dx: Afib with RVR              - Amiodarone PO 200mg BID              - Heparin gtt              - Digoxin load completed yesterday  - Digoxin at therapeutic level at 1 3    - Maintenance dose of 250 mcg daily     - Will repeat Digoxin level               - HR has been in 90s to 100s    - Dx: Hx of CHF               - Home meds:  Torsemide 40 BID and aldactone 50 daily               - Started on 40 IV lasix BID                 -  Started home dose aldactone   - Dx: Hypertriglyceridemia               - 172 (6/13) to 556 (6/19)              - In setting of propofol infusion that has since discontinued (6/18)   - TPN lipid removed  - MAP goal > 65         Lung:   - Dx: Acute respiratory failure               - Extubated 6/20      - Currently on BIPAP      - 20/16 30%  - Dx: Hx of COPD               - Ipratropium and levalbuterol TID               - Pulmicort              - Mucomyst   - Dx: Hx of obesity hypoventilation syndrome               - Will evaluate once extubated   - Continue Respiratory Protocol and Airway Clearance Protocol        GI:   - Dx: SBO             - s/p ex lap HARVEY with abthera placement on 6/14              - 2nd look ex lap with initiation of DPR 6/15              - 3rd look repair of serosal tear, bridging mesh and partial closure                          - VAC in place      - VAC change MWF per surgery   - Stress ulcer ppx: Protonix  - Bowel regimen:Senokot        FEN:   - Fluids: no maintenance   - Electrolytes: trend and replete as needed  - Nutrition: TPN with trickle feed             :   - Dx: Low urine output               - Lasix 40 BID   - Eastman in place      ID:   - Dx: Possible septic shock               - Culture                          - Sputum: 2+ Polys and 1+ gram positive rajat                          - Blood: Pending              - Currently on antibiotics with presumptive septic shock                           - D/c'd cefepime and flagyl                          - Ceftriaxone              - Patient remained afebrile   - Monitor WBC/temp curve     Heme:   - Dx: Anemia              - 8 5 from 7 8              - Continue to monitor daily               - Transfuse if < 7  - Dx: Hx of DVT               - Home meds: pradaxa              - IVC filter still in place 2/2 inability to retreive               - Heparin gtt  - DVT ppx: Heparin gtt, SCDs      Endo:   - Dx: Hx of hypothyroidisim               - Levothyroxine   - Dx: Hyperglycemia              - ISS    - Goal -180     Msk/Skin:   - Dx: Pressure ulcer               - Continue wound care   - PT/OT  - Turning/repositioning  - Wound care      Disposition: Continue ICU care   Invasive lines and devices:   Invasive Devices  Report    Peripherally Inserted Central Catheter Line  Duration           PICC Line 38/12/71 Right Basilic 7 days          Central Venous Catheter Line  Duration           CVC Central Lines 06/14/22 Triple 6 days          Drain  Duration           NG/OG/Enteral Tube Right nare 9 days    Urethral Catheter Latex 16 Fr  6 days                   Physical exam  General:  Resting comfortably in bed in no acute distress  Neuro: GCS 11T (Eye 4, Verbal 1, Motor 6)  HENT:  Normocephalic and atraumatic, PERRL  Heart:  RRR, pulses intact  Lungs:  Bilateral breath sounds present  Abdomen:  Bowel sounds present, soft, VAC in place   Skin:  Warm, dry skin/Incision site clean dry and intact    Vitals  Temperature:   Temp (24hrs), Av 8 °F (37 1 °C), Min:98 4 °F (36 9 °C), Max:99 9 °F (37 7 °C)    Current: Temperature: 99 9 °F (37 7 °C)    Vitals:    22 0300 22 0400 22 0500 22 0600   BP: 113/51 99/59 114/70 132/68   Pulse: (!) 108 (!) 106 (!) 108 (!) 110   Resp: (!) 39 (!) 38 (!) 37 (!) 41   Temp:  99 9 °F (37 7 °C)     TempSrc:  Axillary     SpO2: 92% 93% 94%    Weight:   (!) 233 kg (513 lb 10 8 oz)    Height:         Arterial Line BP: 70/58  Arterial Line MAP (mmHg): (!) 64 mmHg    Non-Invasive/Invasive Ventilation Settings:  Respiratory  Report   Lab Data (Last 4 hours)    None         O2/Vent Data (Last 4 hours)    None              Lab Results   Component Value Date    PHART 7 379 2022    AHH4KFB 59 6 (HH) 2022    PO2ART 118 0 2022    SVX2MXU 34 4 (H) 2022    BEART 8 1 2022    SOURCE Radial, Right 2022     SpO2: SpO2: 94 %    Intake and Outputs:  I/O        0701   0700  0701   0700    I V  (mL/kg) 1481 2 (6 3) 884 1 (3 8)    NG/GT 20 140    IV Piggyback 621 7 782    TPN 1591 6 1520 6    Feedings  138    Total Intake(mL/kg) 3714 5 (15 9) 3464 7 (14 9)    Urine (mL/kg/hr) 2260 (0 4) 3855 (0 7)    Emesis/NG output 900 200    Drains 200 650    Total Output 3360 4705    Net +354 5 -1240 3                Labs:   Results from last 7 days   Lab Units 22  0522 22  0459 22  0551 22  0424 22  2323 22  1307 22  0458 22  0406   WBC Thousand/uL 7 47 6 26 8 91   < > 13 21*  --  14 58* 11 53*   HEMOGLOBIN g/dL 8 5* 7 8* 8 1*   < > 9 4*  --  10 2* 10 7*   I STAT HEMOGLOBIN   --   --   --   --   --    < >  --   --    HEMATOCRIT % 27 2* 25 3* 25 8*   < > 30 0*  --  31 9* 33 4*   HEMATOCRIT, ISTAT   --   --   --   --   --    < >  --   --    PLATELETS Thousands/uL 170 141* 160   < > 238  --  216 197   NEUTROS PCT % 70  --   --   --   --   --  83* 79*   MONOS PCT % 9  --   --   --   --   --  6 10   MONO PCT %  --   --   --   --  6  --   --   --     < > = values in this interval not displayed  Results from last 7 days   Lab Units 06/21/22 0522 06/20/22 0459 06/19/22  0551 06/18/22  0424 06/17/22  1724 06/17/22  1307   SODIUM mmol/L 137 136 134* 134*   < >  --    POTASSIUM mmol/L 3 5 3 3* 4 0 4 5   < >  --    CHLORIDE mmol/L 99* 99* 99* 102   < >  --    CO2 mmol/L 33* 32 31 29   < >  --    CO2, I-STAT mmol/L  --   --   --   --   --  35*   BUN mg/dL 14 16 11 9   < >  --    CREATININE mg/dL 0 50* 0 54* 0 60 0 64   < >  --    CALCIUM mg/dL 8 3 8 4 8 2* 8 4   < >  --    ALK PHOS U/L  --  56 63 61  --   --    ALT U/L  --  13 17 16  --   --    AST U/L  --  13 18 24  --   --    GLUCOSE, ISTAT mg/dl  --   --   --   --   --  137    < > = values in this interval not displayed  Results from last 7 days   Lab Units 06/21/22 0522 06/20/22 0459 06/19/22  0551   MAGNESIUM mg/dL 2 2 2 1 2 2     Results from last 7 days   Lab Units 06/21/22 0522 06/20/22 0459 06/19/22  0551   PHOSPHORUS mg/dL 3 2 2 5* 2 6*      Results from last 7 days   Lab Units 06/21/22 0522 06/20/22 0459 06/19/22  0551 06/15/22  2112 06/15/22  0449   INR   --   --   --   --  1 22*   PTT seconds 60* 79* 70*   < > 32    < > = values in this interval not displayed  Results from last 7 days   Lab Units 06/17/22  2323   LACTIC ACID mmol/L 2 0       Micro:  Lab Results   Component Value Date    BLOODCX No Growth at 48 hrs  06/18/2022    BLOODCX No Growth at 48 hrs  06/18/2022    BLOODCX No Growth After 5 Days   11/26/2021    URINECX >100,000 cfu/ml Mixed Contaminants X3 09/13/2016    WOUNDCULT (A) 11/26/2021 2+ Growth of Methicillin Resistant Staphylococcus aureus    SPUTUMCULTUR 2+ Growth of Providencia stuartii (A) 06/18/2022    SPUTUMCULTUR 1+ Growth of  06/18/2022    SPUTUMCULTUR 3+ Growth of Mixed Respiratory Cece 10/11/2016       Resulted Labs    Pending Labs      Imaging Studies    Imaging:   XR chest portable ICU   Final Result by Richardine Fabry, MD (06/19 0008)      1  Image quality limited but suspicious for pneumonia in the left lung base unchanged since the most recent prior study  Superimposed CHF would also be a consideration  2   Lines and tubes in satisfactory position  Workstation performed: [id number]         XR chest portable ICU   Final Result by Sandra Duran MD (06/17 2011)      Questioned left basilar infiltrate  Magdiel Gilberto Heart shadow is enlarged and there is pulmonary vascular congestion concerning for CHF  Workstation performed: [id number]         XR chest portable ICU   Final Result by Authur Sever, MD (06/17 1043)      Pulmonary vascular congestion without a definitive consolidation or effusion  No pneumothorax  Workstation performed: [id number]           I have personally reviewed pertinent reports  Allergies:    Allergies   Allergen Reactions    Penicillins Hives         Medications:   Scheduled Meds:  Current Facility-Administered Medications   Medication Dose Route Frequency Provider Last Rate    acetylcysteine  3 mL Nebulization TID Louie Montano MD      Adult TPN (CUSTOM BASE/CUSTOM ELECTROLYTE)   Intravenous Continuous TPN Cathi Brito MD 56 8 mL/hr at 06/20/22 2218    amiodarone  200 mg Oral BID With Meals Cathi Brito MD      budesonide  0 5 mg Nebulization Q12H Crow Santizo PA-C      cefTRIAXone  2,000 mg Intravenous Q24H Cathi Brito MD 2,000 mg (06/20/22 1722)    chlorhexidine  15 mL Mouth/Throat Q12H [address], MD      digoxin  250 mcg Per NG Tube Daily MD Wily Thompson furosemide  40 mg Intravenous BID (diuretic) Nathalia Ha MD      heparin (porcine)  3-30 Units/kg/hr (Order-Specific) Intravenous Titrated Brenda Arshad PA-C 26 Units/kg/hr (06/21/22 0725)    heparin (porcine)  10,000 Units Intravenous Q1H PRN Brenda Arshad PA-C      heparin (porcine)  5,000 Units Intravenous Q1H PRN Brenda Arshad PA-C      HYDROmorphone  1 mg Intravenous Q4H PRN Brenda Arshad PA-C      insulin lispro  1-5 Units Subcutaneous Q6H [facility] Mahadrenay Monk MD      ipratropium  0 5 mg Nebulization TID Mahad Monk MD      levalbuterol  1 25 mg Nebulization TID Mahad Monk MD      norepinephrine  1-30 mcg/min Intravenous Titrated Brenda Arshad PA-C Stopped (06/19/22 1100)    oxyCODONE  10 mg Oral Q4H PRN Nathalia Ha MD      oxyCODONE  5 mg Oral Q4H PRN Nathalia Ha MD      pantoprazole  40 mg Intravenous Q24H [facility] Mahadrenay Monk MD      QUEtiapine  50 mg Oral HS Nathalia Ha MD      senna-docusate sodium  1 tablet Oral BID Nathalia Ha MD      spironolactone  50 mg Per NG Tube Daily Reynaldo Mayfield PA-C       Continuous Infusions:Adult TPN (CUSTOM BASE/CUSTOM ELECTROLYTE), , Last Rate: 56 8 mL/hr at 06/20/22 2218  heparin (porcine), 3-30 Units/kg/hr (Order-Specific), Last Rate: 26 Units/kg/hr (06/21/22 0725)  norepinephrine, 1-30 mcg/min, Last Rate: Stopped (06/19/22 1100)      PRN Meds:  heparin (porcine), 10,000 Units, Q1H PRN  heparin (porcine), 5,000 Units, Q1H PRN  HYDROmorphone, 1 mg, Q4H PRN  oxyCODONE, 10 mg, Q4H PRN  oxyCODONE, 5 mg, Q4H PRN        Counseling / Coordination of Care  Total Critical Care time spent 30 minutes excluding procedures, teaching and family updates  Code Status: Level 1 - Full Code     Portions of the record may have been created with voice recognition software  Occasional wrong word or "sound a like" substitutions may have occurred due to the inherent limitations of voice recognition software  Read the chart carefully and recognize, using context, where substitutions have occurred       Ermelinda Esquivel MD

## 2022-06-21 NOTE — CONSULTS
Reason for Consult / Principal Problem:Afib    Physician Requesting Consult:  Friddie Scheuermann, MD    Cardiologist: Dr Natasha Dash and Plan      Current Problem List   Principal Problem:    SBO  Active Problems:    Atrial fibrillation with RVR ([facility] )    COPD (chronic obstructive pulmonary disease) ([facility] )    Acute respiratory failure (HCC)    Assessment/Plan:    1  Premenant Atrial Fibrillation - with difficult to control rates in the setting of SBO now with trickle feeds on PO amiodarone post IV amiodarone and IV digoxin  ·  now more rate controlled on PO digoxin would continue for now and attempt to re-introduce beta-blockers as BP allows now that patient is off pressors  · Continue PO amiodarone for now, would not keeep the patient on this medication for long secondary to the patients young age and known lung toxicity  · Continue IV heparin, would transition back to oral anticoagulation one there is no more plan for further procedures  · Monitor telemetry  2  Chronic diastolic CHF - not in acute exacerbation,   ·  Does appear to have iatrogenic fluid overload - has been on intermittent diuresis, consider additional diureses  Up approximately 8 pounds 11 days  3  Acute respiratory failure per primary         Subjective     CC: Afib with RVR    HPI: This is a 47year old male with a PMH of diastolic HF, Afib, COPD, morbid obesity, pulmonary artery aneurysm, hx of DVT, chronically on anticoagulation with Pradaxa who is admitted to the surgical ICU secondary to SBO secondary to chronic incarcerated ventral hernia, POD 6 ex lap and POD 3 abdominal washout  Currently on trickle feeds and TPN  We are being consulted regarding Digoxin management in the setting of afib with RVR  The patient has had extreme difficulty with weight control during this hospitalization, He was on amiodarone drip for 4 days that was discontinued   As well as PO amiodarone that he is still currently on at 200mg BID  He received a total of 750mcg of IV digoxin, digoxin level on 6/19 was 1 3  He is now on 250mcg of digoxin daily  He is now currently rate controlled in the 's on this regimen and is anticoagulated with heparin  He was not on any BB therapy secondary to vasopressor therapy that has now been discontinued  Subjectively the patient is unable to provide history but denies any current complaints  Previous cardiac workup includes an ECHO in April 2022, which was technically difficult but EF was 55%  The patient has no previous ischemic workup or coronary disease  The patient is in permeant AFIB since 2016  Telemetry: AFIB with rate controlled response no ventricular arrytmia    Net fluid balance: I/Os not accurate     EKG:  Afib with RV, normal axis on 17th of June    Stress test: No history of ischemic workup     Cardiac Catheterization: No history of ischemic workup     Labs: Notable normal creatinine, chronically elevated CO2, hemoglobin 8 5,         Family History:   Family History   Problem Relation Age of Onset    Lung cancer Father     Diabetes Maternal Aunt     Heart attack Mother     Clotting disorder Mother     Hypertension Mother     Heart failure Mother     Diabetes Mother     Atrial fibrillation Mother     Sleep apnea Mother     Obesity Mother     Asthma Sister     Stroke Neg Hx     Anuerysm Neg Hx     Arrhythmia Neg Hx     Hyperlipidemia Neg Hx         pt unsure    Fainting Neg Hx      Historical Information   Past Medical History:   Diagnosis Date    Afib ([facility] )     Anemia     Anxiety     Cancer ([place] [address] )     Cardiac disease     Cellulitis     [facility]     Depression     Diabetes mellitus ([facility] )     DVT [facility]deep venous thrombosis) ([facility])     GERD (gastroesophageal reflux disease)     HBP (high blood pressure)     Heart failure ([facility])     Hx of blood clots     Hypertension     Hypokalemia     Hypothyroid     Obesity     Psychiatric disorder      Past Surgical History:   Procedure Laterality Date    ABDOMINAL HERNIA REPAIR  05/2019    CHOLECYSTECTOMY      Lap    GASTRECTOMY SLEEVE LAPAROSCOPIC  08/2018    INCISION AND DRAINAGE OF WOUND Bilateral 12/01/2021    Procedure: INCISION AND DRAINAGE (I&D) HEAD/FACE;  Surgeon: Kayleigh Barksdale MD;  Location:  MAIN OR;  Service: General    IR PICC PLACEMENT DOUBLE LUMEN  06/13/2022    IVC FILTER INSERTION  2018    KNEE SURGERY Right     open wound, injury     LAPAROTOMY N/A 6/14/2022    Procedure: LAPAROTOMY EXPLORATORY, EXTENSIVE LYSIS OF ADHESIONS, APPLICATION OF ABTHERA WOUND VAC;  Surgeon: Brenda Cuellar MD;  Location: [address] [place] [address];  Service: General    LAPAROTOMY N/A 6/15/2022    Procedure: LAPAROTOMY EXPLORATORY WITH REPAIR OF SEROSAL INJURY;  Surgeon: Arden Glez MD;  Location: [facility];  Service: General    LEG SURGERY Right 2009    US GUIDED VASCULAR ACCESS  08/06/2018    VAC DRESSING APPLICATION N/A 7/72/2043    Procedure: CHANGE DRESSING/VAC ABDOMEN;  Surgeon: Arden Glez MD;  [facility];  Service: General    VAC DRESSING APPLICATION N/A 4/72/1977    Procedure: ABDOMINAL WASHOUT, REPAIR OF SEROSAL TEARS,BRIDING VICRYL MESH, PARTIAL CLOSURE, APPLICATION OF WOUND VAC;  Surgeon: Demario Rodriguez DO;  Location[facility];  Service: General     Social History   Social History     Substance and Sexual Activity   Alcohol Use Not Currently     Social History     Substance and Sexual Activity   Drug Use No     Social History     Tobacco Use   Smoking Status Former Smoker    Packs/day: 1 00    Years: 15 00    Pack years: 15 00   Smokeless Tobacco Never Used   Tobacco Comment    1 5ppd x 23 years, quit 2014     Family History:   Family History   Problem Relation Age of Onset    Lung cancer Father     Diabetes Maternal Aunt     Heart attack Mother     Clotting disorder Mother     Hypertension Mother     Heart failure Mother     Diabetes Mother     Atrial fibrillation Mother     Sleep apnea Mother     Obesity Mother     Asthma Sister     Stroke Neg Hx     Anuerysm Neg Hx     Arrhythmia Neg Hx     Hyperlipidemia Neg Hx         pt unsure    Fainting Neg Hx        Review of Systems:  Review of Systems   Unable to perform ROS: Mental status change           Scheduled Meds:  Current Facility-Administered Medications   Medication Dose Route Frequency Provider Last Rate    acetaminophen  650 mg Oral Q6H Hellen MANPREET Cordoba PA-C      acetylcysteine  3 mL Nebulization TID Louie Montano MD      Adult TPN (CUSTOM BASE/CUSTOM ELECTROLYTE)   Intravenous Continuous TPN Cathi Brito MD 56 8 mL/hr at 06/20/22 2218    Adult TPN (CUSTOM BASE/CUSTOM ELECTROLYTE)   Intravenous Continuous TPN Ritesh Del Toro PA-C      amiodarone  200 mg Oral TID With Meals ALESSANDRO Kumar-JAMES      bisacodyl  10 mg Rectal Daily ALESSANDRO Kumar-JAMES      budesonide-formoterol  2 puff Inhalation BID ALESSANDRO Kumar-JAMES      cefTRIAXone  2,000 mg Intravenous Q24H Cathi Brito MD 2,000 mg (06/20/22 1722)    [START ON 6/22/2022] digoxin  125 mcg Per NG Tube Daily Cat Bhargavi ALESSANDRO Cordoba-C      gabapentin  300 mg Oral TID Ritesh Del Toro PA-C      heparin (porcine)  3-30 Units/kg/hr (Order-Specific) Intravenous Titrated ALESSANDRO Lam-C 26 Units/kg/hr (06/21/22 0725)    heparin (porcine)  10,000 Units Intravenous Q1H PRN Crow Slanader, PA-C      heparin (porcine)  5,000 Units Intravenous Q1H PRN Crow Santizo PA-C      HYDROmorphone  1 mg Intravenous Q4H PRN Crow Santizo, PA-C      insulin lispro  1-5 Units Subcutaneous Q6H [address] Chang Rodriguez MD      ipratropium  0 5 mg Nebulization TID Jose Mccurdy MD      levalbuterol  1 25 mg Nebulization TID Jose Mccurdy MD      levothyroxine  25 mcg Oral Early Morning Cat Bhargavi Beryl, PA-C      methocarbamol  500 mg Oral Q6H PRN Ritesh Del Toro JIMENA      omeprazole (PRILOSEC) suspension 2 mg/mL  20 mg Oral Early Morning Patsy Erickson PA-C      oxyCODONE  10 mg Oral Q4H PRN Fredy Pop MD      oxyCODONE  5 mg Oral Q4H PRN Fredy Pop MD      polyethylene glycol  17 g Oral Daily Hellen Cordoba PA-C      QUEtiapine  50 mg Oral HS Fredy Pop MD      senna-docusate sodium  1 tablet Oral BID Fredy Pop MD      spironolactone  50 mg Per NG Tube Daily Joi Higgins PA-C      torsemide  40 mg Oral BID Hellen Cordoba PA-C       Continuous Infusions:Adult TPN (CUSTOM BASE/CUSTOM ELECTROLYTE), , Last Rate: 56 8 mL/hr at 06/20/22 2218  Adult TPN (CUSTOM BASE/CUSTOM ELECTROLYTE),   heparin (porcine), 3-30 Units/kg/hr (Order-Specific), Last Rate: 26 Units/kg/hr (06/21/22 0725)      PRN Meds:   heparin (porcine)    heparin (porcine)    HYDROmorphone    methocarbamol    oxyCODONE    oxyCODONE  PTA meds:   Prior to Admission Medications   Prescriptions Last Dose Informant Patient Reported? Taking? Cholecalciferol (VITAMIN D) 50 MCG (2000 UT) tablet  Outside Facility (Specify) Yes No   Sig: Take 2,000 Units by mouth daily   Potassium Chloride (KCL-20 PO)   Yes No   Sig: Take 20 mg by mouth in the morning   aluminum-magnesium hydroxide-simethicone (MYLANTA) 200-200-20 mg/5 mL suspension   No No   Sig: Take 30 mL by mouth every 6 (six) hours as needed for indigestion or heartburn   ammonium lactate (LAC-HYDRIN) 12 % cream   No No   Sig: Apply topically 2 (two) times a day   buPROPion (FORFIVO XL) 450 MG 24 hr tablet   No No   Sig: Take 1 tablet (450 mg total) by mouth daily   budesonide-formoterol (SYMBICORT) 160-4 5 mcg/act inhaler   Yes No   Sig: Inhale 2 puffs 2 (two) times a day Rinse mouth after use     cyanocobalamin (VITAMIN B-12) 1000 MCG tablet  Outside Facility (Specify) Yes No   Sig: Take 1,000 mcg by mouth daily   dabigatran etexilate (PRADAXA) 150 mg capsu  Outside Facility (Specify) Yes No   Sig: Take 150 mg by mouth 2 (two) times a day   dicyclomine (BENTYL) 20 mg tablet   No No   Sig: Take 1 tablet (20 mg total) by mouth 4 (four) times a day (before meals and at bedtime)   docusate sodium (COLACE) 100 mg capsule   No No   Sig: Take 1 capsule (100 mg total) by mouth 2 (two) times a day   famotidine (PEPCID) 20 mg tablet   No No   Sig: Take 1 tablet (20 mg total) by mouth daily   ipratropium-albuterol (DUO-NEB) 0 5-2 5 mg/3 mL nebulizer solution  Outside Facility (Specify) Yes No   Sig: Take 3 mL by nebulization 3 (three) times a day   levothyroxine 25 mcg tablet  Outside Facility (Specify) Yes No   Sig: Take 25 mcg by mouth daily   loratadine (CLARITIN) 10 mg tablet   Yes No   Sig: Take 10 mg by mouth daily   metoprolol tartrate (LOPRESSOR) 50 mg tablet   No No   Sig: Take 2 tablets (100 mg total) by mouth every 12 (twelve) hours   nystatin (MYCOSTATIN) cream   No No   Sig: Apply topically 2 (two) times a day   Patient not taking: Reported on 2022   omeprazole (PriLOSEC) 20 mg delayed release capsule   Yes No   Sig: Take 40 mg by mouth daily   ondansetron (ZOFRAN) 4 mg tablet   No No   Sig: Take 1 tablet (4 mg total) by mouth every 8 (eight) hours as needed for nausea or vomiting   Patient not taking: Reported on 2022   polyethylene glycol (MIRALAX) 17 g packet   No No   Sig: Take 17 g by mouth daily   sodium chloride (OCEAN) 0 65 % nasal spray   No No   Si spray into each nostril as needed for congestion   spironolactone (ALDACTONE) 50 mg tablet  Outside Facility (Specify) Yes No   Sig: Take 50 mg by mouth daily   torsemide 40 MG TABS   No No   Sig: Take 40 mg by mouth 2 (two) times a day      Facility-Administered Medications: None       Allergies   Allergen Reactions    Penicillins Hives       Objective   Vitals: Temp (24hrs), Av 6 °F (37 °C), Min:98 1 °F (36 7 °C), Max:99 9 °F (37 7 °C)  Current: Temperature: 98 1 °F (36 7 °C)  Patient Vitals for the past 24 hrs:   BP Temp Temp src Pulse Resp SpO2 Weight   06/21/22 1100 105/60 -- -- (!) 108 (!) 43 94 % --   06/21/22 1000 112/60 98 1 °F (36 7 °C) Oral (!) 112 (!) 41 94 % --   06/21/22 0900 112/60 -- -- (!) 108 (!) 41 94 % --   06/21/22 0800 114/65 98 1 °F (36 7 °C) Oral (!) 110 (!) 40 93 % --   06/21/22 0600 132/68 -- -- (!) 110 (!) 41 -- --   06/21/22 0500 114/70 -- -- (!) 108 (!) 37 94 % (!) 233 kg (513 lb 10 8 oz)   06/21/22 0400 99/59 99 9 °F (37 7 °C) Axillary (!) 106 (!) 38 93 % --   06/21/22 0300 113/51 -- -- (!) 108 (!) 39 92 % --   06/21/22 0225 -- -- -- -- -- 95 % --   06/21/22 0200 101/61 -- -- (!) 106 (!) 40 95 % --   06/21/22 0100 (!) 98/47 -- -- 102 (!) 35 95 % --   06/21/22 0000 119/71 98 4 °F (36 9 °C) -- (!) 106 (!) 37 96 % --   06/20/22 2300 127/54 -- -- 96 (!) 32 99 % --   06/20/22 2200 103/54 -- -- 100 (!) 31 99 % --   06/20/22 2100 106/51 -- -- 100 (!) 26 98 % --   06/20/22 2000 102/54 98 7 °F (37 1 °C) -- 104 (!) 29 96 % --   06/20/22 1959 -- -- -- -- -- 95 % --   06/20/22 1900 107/51 -- -- 104 (!) 35 95 % --   06/20/22 1600 125/64 -- -- 104 (!) 29 98 % --   06/20/22 1529 -- -- -- -- -- 100 % --   06/20/22 1500 (!) 104/45 -- -- 96 (!) 27 100 % --   06/20/22 1400 111/54 -- -- 100 (!) 32 100 % --   06/20/22 1300 (!) 110/48 -- -- 96 (!) 25 99 % --    Body mass index is 71 64 kg/m²  Orthostatic Blood Pressures    Flowsheet Row Most Recent Value   Blood Pressure 105/60 filed at 06/21/2022 1100   Patient Position - Orthostatic VS Lying filed at 06/21/2022 1100              Invasive Devices  Report    Peripherally Inserted Central Catheter Line  Duration           PICC Line 49/99/63 Right Basilic 8 days          Drain  Duration           NG/OG/Enteral Tube Right nare 9 days                Physical Exam:  Physical Exam  Constitutional:       Appearance: He is obese  Cardiovascular:      Rate and Rhythm: Normal rate  Rhythm irregular  Heart sounds: No murmur heard  Pulmonary:      Effort: No respiratory distress     Abdominal: General: There is no distension  Skin:     General: Skin is warm  Neurological:      Mental Status: He is alert     Psychiatric:         Mood and Affect: Mood normal              Lab Results:   Results from last 7 days   Lab Units 06/21/22  0522 06/20/22  0459 06/19/22  0551 06/18/22  0424 06/17/22  2323 06/17/22  1307 06/17/22  0458 06/16/22  0406 06/15/22  1409 06/15/22  0449 06/14/22  1847 06/14/22  1339   WBC Thousand/uL 7 47 6 26 8 91 13 86* 13 21*  --  14 58* 11 53* 10 59* 9 64 9 04 5 18   HEMOGLOBIN g/dL 8 5* 7 8* 8 1* 9 6* 9 4*  --  10 2* 10 7* 12 1 11 2* 11 4* 11 7*   I STAT HEMOGLOBIN g/dl  --   --   --   --   --  10 2*  --   --   --   --   --   --    HEMATOCRIT % 27 2* 25 3* 25 8* 30 8* 30 0*  --  31 9* 33 4* 37 9 34 6* 34 9* 36 2*   HEMATOCRIT, ISTAT %  --   --   --   --   --  30*  --   --   --   --   --   --    PLATELETS Thousands/uL 170 141* 160 208 238  --  216 197 171 168 162 168   NEUTROS PCT % 70  --   --   --   --   --  83* 79* 83* 86*  --  84*   MONOS PCT % 9  --   --   --   --   --  6 10 8 7  --  7   MONO PCT %  --   --   --   --  6  --   --   --   --   --   --   --       Results from last 7 days   Lab Units 06/21/22  0522 06/20/22  0459 06/19/22  0551 06/19/22  0032 06/18/22  1539 06/18/22  0612 06/18/22  0424 06/17/22  2323 06/17/22  1724 06/17/22  1307 06/17/22  0458 06/17/22  0130 06/16/22  1833 06/16/22  1117 06/16/22  0406 06/15/22  2112 06/15/22  1409 06/15/22  0650 06/15/22  0449 06/14/22  1847 06/14/22  1339   SODIUM mmol/L 137 136 134*  --   --   --  134* 135* 135*  --  135*  --   --   --  137  --  136  --  136 135* 138   POTASSIUM mmol/L 3 5 3 3* 4 0  --   --   --  4 5 4 5 4 6  --  4 3  --   --   --  4 6  --  4 7 5 3 5 2 4 8 4 9   CHLORIDE mmol/L 99* 99* 99*  --   --   --  102 102 102  --  101  --   --   --  102  --  102  --  103 104 102   CO2 mmol/L 33* 32 31  --   --   --  29 29 31  --  32  --   --   --  31  --  28  --  32 26 26   CO2, I-STAT mmol/L  --   --   --   --   --   -- --   --   --  35*  --   --   --   --   --   --   --   --   --   --   --    BUN mg/dL 14 16 11  --   --   --  9 11 12  --  13  --   --   --  12  --  8  --  6 6 7   CREATININE mg/dL 0 50* 0 54* 0 60  --   --   --  0 64 0 74 0 75  --  0 78  --   --   --  0 90  --  0 78  --  0 65 0 62 0 65   CALCIUM mg/dL 8 3 8 4 8 2*  --   --   --  8 4 8 3 8 4  --  8 4  --   --   --  8 4  --  8 7  --  8 7 9 0 8 1*   ALK PHOS U/L  --  56 63  --   --   --  61  --   --   --   --   --   --   --   --   --   --   --  63 65 72   ALT U/L  --  13 17  --   --   --  16  --   --   --   --   --   --   --   --   --   --   --  29 32 30   AST U/L  --  13 18  --   --   --  24  --   --   --   --   --   --   --   --   --   --   --  15 19 21   GLUCOSE, ISTAT mg/dl  --   --   --   --   --   --   --   --   --  137  --   --   --   --   --   --   --   --   --   --   --    MAGNESIUM mg/dL 2 2 2 1 2 2  --   --   --  2 5  --  2 6  --  2 7*  --   --   --  2 2  --  2 3  --  2 3  --  1 7   PHOSPHORUS mg/dL 3 2 2 5* 2 6*  --   --   --  2 7  --  4 0  --  3 7  --   --   --  4 4  --  4 7*  --  4 0  --  3 5   INR   --   --   --   --   --   --   --   --   --   --   --   --   --   --   --   --   --   --  1 22*  --   --    PTT seconds 60* 79* 70* 73* 42* 38*  --  38*  --   --   --  39* 43* 35 33 29  --   --  32  --   --    EGFR ml/min/1 73sq m 125 121 116  --   --   --  113 106 106  --  104  --   --   --  98  --  104  --  112 114 112     Results from last 7 days   Lab Units 06/21/22  0522 06/20/22  0459 06/19/22  0551 06/19/22  0032 06/18/22  1539 06/18/22  0612 06/17/22  2323 06/17/22  0130 06/16/22  1833 06/16/22  1117 06/16/22  0406 06/15/22  2112 06/15/22  0449   INR   --   --   --   --   --   --   --   --   --   --   --   --  1 22*   PTT seconds 60* 79* 70* 73* 42* 38* 38* 39* 43* 35 33 29 32     Results from last 7 days   Lab Units 06/17/22 2323   LACTIC ACID mmol/L 2 0         Lab Results   Component Value Date    PHART 7 419 06/21/2022    UCV0RLT 54 0 (H) 06/21/2022    PO2ART 65 3 (L) 06/21/2022    SET5FMH 34 2 (H) 06/21/2022    BEART 8 5 06/21/2022    SOURCE Radial, Right 06/21/2022     No components found for: HIV1X2  No results found for: HAV, HEPAIGM, HEPBIGM, HEPBCAB, HBEAG, HEPCAB  No results found for: SPEP, UPEP   Lab Results   Component Value Date    HGBA1C 5 6 06/07/2022    HGBA1C 5 4 03/21/2022    HGBA1C 5 5 12/22/2021     No results found for: CHOL   Lab Results   Component Value Date    HDL 44 08/16/2016      Lab Results   Component Value Date    LDLCALC 80 08/16/2016      Lab Results   Component Value Date    TRIG 556 (H) 06/19/2022    TRIG 172 (H) 06/13/2022    TRIG 197 (H) 08/16/2016     No components found for: PROCAL          Imaging: I have personally reviewed pertinent reports

## 2022-06-21 NOTE — PROGRESS NOTES
Reason for Consult / Principal Problem:Afib    Physician Requesting Consult:  Andrae Reis MD    Cardiologist: Dr Margot Rodriguez and Plan      Current Problem List   Principal Problem:    SBO  Active Problems:    Atrial fibrillation with RVR ([facility] )    COPD (chronic obstructive pulmonary disease) ([facility] )    Acute respiratory failure (HCC)    Assessment/Plan:    1  Premenant Atrial Fibrillation - with difficult to control rates in the setting of SBO now with trickle feeds on PO amiodarone post IV amiodarone and IV digoxin  ·  now more rate controlled on PO digoxin would continue for now and attempt to re-introduce beta-blockers as BP allows now that patient is off pressors  · Continue PO amiodarone for now, would not keeep the patient on this medication for long secondary to the patients young age and known lung toxicity  · Continue IV heparin, would transition back to oral anticoagulation one there is no more plan for further procedures  · Monitor telemetry  2  Chronic diastolic CHF - not in acute exacerbation,   ·  Does appear to have iatrogenic fluid overload - has been on intermittent diuresis, consider additional diureses  Up approximately 8 pounds 11 days  3  Acute respiratory failure per primary         Subjective     CC: Afib with RVR    HPI: This is a 47year old male with a PMH of diastolic HF, Afib, COPD, morbid obesity, pulmonary artery aneurysm, hx of DVT, chronically on anticoagulation with Pradaxa who is admitted to the surgical ICU secondary to SBO secondary to chronic incarcerated ventral hernia, POD 6 ex lap and POD 3 abdominal washout  Currently on trickle feeds and TPN  We are being consulted regarding Digoxin management in the setting of afib with RVR  The patient has had extreme difficulty with weight control during this hospitalization, He was on amiodarone drip for 4 days that was discontinued   As well as PO amiodarone that he is still currently on at 200mg BID  He received a total of 750mcg of IV digoxin, digoxin level on 6/19 was 1 3  He is now on 250mcg of digoxin daily  He is now currently rate controlled in the 's on this regimen and is anticoagulated with heparin  He was not on any BB therapy secondary to vasopressor therapy that has now been discontinued  Subjectively the patient is unable to provide history but denies any current complaints  Previous cardiac workup includes an ECHO in April 2022, which was technically difficult but EF was 55%  The patient has no previous ischemic workup or coronary disease  The patient is in permeant AFIB since 2016  Telemetry: AFIB with rate controlled response no ventricular arrytmia    Net fluid balance: I/Os not accurate     EKG:  Afib with RV, normal axis on 17th of June    Stress test: No history of ischemic workup     Cardiac Catheterization: No history of ischemic workup     Labs: Notable normal creatinine, chronically elevated CO2, hemoglobin 8 5,         Family History:   Family History   Problem Relation Age of Onset    Lung cancer Father     Diabetes Maternal Aunt     Heart attack Mother     Clotting disorder Mother     Hypertension Mother     Heart failure Mother     Diabetes Mother     Atrial fibrillation Mother     Sleep apnea Mother     Obesity Mother     Asthma Sister     Stroke Neg Hx     Anuerysm Neg Hx     Arrhythmia Neg Hx     Hyperlipidemia Neg Hx         pt unsure    Fainting Neg Hx      Historical Information   Past Medical History:   Diagnosis Date    Afib ([facility] )     Anemia     Anxiety     Cancer ([place] [address] )     Cardiac disease     Cellulitis     [facility] obstructive [facility]) [facility]     Depression     Diabetes mellitus ([facility] )     DVT (deep venous thrombosis) ([facility])     GERD (gastroesophageal reflux disease)     HBP (high blood pressure)     Heart failure ([facility])     Hx of blood clots     Hypertension     Hypokalemia     Hypothyroid     Obesity     Psychiatric disorder      Past Surgical History:   Procedure Laterality Date    ABDOMINAL HERNIA REPAIR  05/2019    CHOLECYSTECTOMY      Lap    GASTRECTOMY SLEEVE LAPAROSCOPIC  08/2018    INCISION AND DRAINAGE OF WOUND Bilateral 12/01/2021    Procedure: INCISION AND DRAINAGE (I&D) HEAD/FACE;  Surgeon: Khari Enriquez MD;  Location:  MAIN OR;  Service: General    IR PICC PLACEMENT DOUBLE LUMEN  06/13/2022    IVC FILTER INSERTION  2018    KNEE SURGERY Right     open wound, injury     LAPAROTOMY N/A 6/14/2022    Procedure: LAPAROTOMY EXPLORATORY, EXTENSIVE LYSIS OF ADHESIONS, APPLICATION OF ABTHERA WOUND VAC;  Surgeon: Erika Moseley MD;  Location: [address] [place] [address];  Service: General    LAPAROTOMY N/A 6/15/2022    Procedure: LAPAROTOMY EXPLORATORY WITH REPAIR OF SEROSAL INJURY;  Surgeon: Peace Bernal MD;  Location:  MAIN OR;  Service: General    LEG SURGERY Right 2009    US GUIDED VASCULAR ACCESS  08/06/2018    VAC DRESSING APPLICATION N/A 8/78/5104    Procedure: CHANGE DRESSING/VAC ABDOMEN;  Surgeon: Peace Bernal MD;  Location: [facility];  Service: General    VAC DRESSING APPLICATION N/A 0/37/6916    Procedure: ABDOMINAL WASHOUT, REPAIR OF SEROSAL TEARS,BRIDING VICRYL MESH, PARTIAL CLOSURE, APPLICATION OF WOUND VAC;  Surgeon: Jayjay Ochoa DO;  Location: [facility];  Service: General     Social History   Social History     Substance and Sexual Activity   Alcohol Use Not Currently     Social History     Substance and Sexual Activity   Drug Use No     Social History     Tobacco Use   Smoking Status Former Smoker    Packs/day: 1 00    Years: 15 00    Pack years: 15 00   Smokeless Tobacco Never Used   Tobacco Comment    1 5ppd x 23 years, quit 2014     Family History:   Family History   Problem Relation Age of Onset    Lung cancer Father     Diabetes Maternal Aunt     Heart attack Mother     Clotting disorder Mother     Hypertension Mother     Heart failure Mother     Diabetes Mother     Atrial fibrillation Mother     Sleep apnea Mother     Obesity Mother     Asthma Sister     Stroke Neg Hx     Anuerysm Neg Hx     Arrhythmia Neg Hx     Hyperlipidemia Neg Hx         pt unsure    Fainting Neg Hx        Review of Systems:  Review of Systems   Unable to perform ROS: Mental status change           Scheduled Meds:  Current Facility-Administered Medications   Medication Dose Route Frequency Provider Last Rate    acetaminophen  650 mg Oral Q6H Hellen Cordoba PA-C      acetylcysteine  3 mL Nebulization TID Peace Bernal MD      Adult TPN (CUSTOM BASE/CUSTOM ELECTROLYTE)   Intravenous Continuous TPN Kofi Garcia MD 56 8 mL/hr at 06/20/22 2218    Adult TPN (CUSTOM BASE/CUSTOM ELECTROLYTE)   Intravenous Continuous TPN Teagan Hernandez PA-C      amiodarone  200 mg Oral TID With Meals Teagan Hernandez PA-C      bisacodyl  10 mg Rectal Daily Teagan Hernandez PA-C      budesonide-formoterol  2 puff Inhalation BID Teagan Hernandez PA-C      cefTRIAXone  2,000 mg Intravenous Q24H Kofi Garcia MD 2,000 mg (06/20/22 1722)    [START ON 6/22/2022] digoxin  125 mcg Per NG Tube Daily Merlyn Cordoba PA-C      gabapentin  300 mg Oral TID Teagan Hernandez PA-C      heparin (porcine)  3-30 Units/kg/hr (Order-Specific) Intravenous Titrated Zach Mendenhall PA-C 26 Units/kg/hr (06/21/22 0725)    heparin (porcine)  10,000 Units Intravenous Q1H PRN Zach Mendenhall PA-C      heparin (porcine)  5,000 Units Intravenous Q1H PRN Zach Mendenhall PA-C      HYDROmorphone  1 mg Intravenous Q4H PRN Zach Mendenhall PA-C      insulin lispro  1-5 Units Subcutaneous Q6H [address] Chang Esparza MD      ipratropium  0 5 mg Nebulization TID Jose Vasquez MD      levalbuterol  1 25 mg Nebulization TID Jose Vasquez MD      levothyroxine  25 mcg Oral Early Morning Merlyn Cordoba PA-C      methocarbamol  500 mg Oral Q6H PRN Teagan Hernandez JIMENA      omeprazole (PRILOSEC) suspension 2 mg/mL  20 mg Oral Early Morning Marcus Connors PA-C      oxyCODONE  10 mg Oral Q4H PRN Rubens Proper, MD      oxyCODONE  5 mg Oral Q4H PRN Rubens Proper, MD      polyethylene glycol  17 g Oral Daily Hellen Cordoba PA-C      QUEtiapine  50 mg Oral HS Rubens Proper, MD      senna-docusate sodium  1 tablet Oral BID Rubens Proper, MD      spironolactone  50 mg Per NG Tube Daily Babb OvercastJIMENA      torsemide  40 mg Oral BID Hellen Cordoba PA-C       Continuous Infusions:Adult TPN (CUSTOM BASE/CUSTOM ELECTROLYTE), , Last Rate: 56 8 mL/hr at 06/20/22 2218  Adult TPN (CUSTOM BASE/CUSTOM ELECTROLYTE),   heparin (porcine), 3-30 Units/kg/hr (Order-Specific), Last Rate: 26 Units/kg/hr (06/21/22 0725)      PRN Meds:   heparin (porcine)    heparin (porcine)    HYDROmorphone    methocarbamol    oxyCODONE    oxyCODONE  PTA meds:   Prior to Admission Medications   Prescriptions Last Dose Informant Patient Reported? Taking? Cholecalciferol (VITAMIN D) 50 MCG (2000 UT) tablet  Outside Facility (Specify) Yes No   Sig: Take 2,000 Units by mouth daily   Potassium Chloride (KCL-20 PO)   Yes No   Sig: Take 20 mg by mouth in the morning   aluminum-magnesium hydroxide-simethicone (MYLANTA) 200-200-20 mg/5 mL suspension   No No   Sig: Take 30 mL by mouth every 6 (six) hours as needed for indigestion or heartburn   ammonium lactate (LAC-HYDRIN) 12 % cream   No No   Sig: Apply topically 2 (two) times a day   buPROPion (FORFIVO XL) 450 MG 24 hr tablet   No No   Sig: Take 1 tablet (450 mg total) by mouth daily   budesonide-formoterol (SYMBICORT) 160-4 5 mcg/act inhaler   Yes No   Sig: Inhale 2 puffs 2 (two) times a day Rinse mouth after use     cyanocobalamin (VITAMIN B-12) 1000 MCG tablet  Outside Facility (Specify) Yes No   Sig: Take 1,000 mcg by mouth daily   dabigatran etexilate (PRADAXA) 150 mg capsu  Outside Facility (Specify) Yes No   Sig: Take 150 mg by mouth 2 (two) times a day   dicyclomine (BENTYL) 20 mg tablet   No No   Sig: Take 1 tablet (20 mg total) by mouth 4 (four) times a day (before meals and at bedtime)   docusate sodium (COLACE) 100 mg capsule   No No   Sig: Take 1 capsule (100 mg total) by mouth 2 (two) times a day   famotidine (PEPCID) 20 mg tablet   No No   Sig: Take 1 tablet (20 mg total) by mouth daily   ipratropium-albuterol (DUO-NEB) 0 5-2 5 mg/3 mL nebulizer solution  Outside Facility (Specify) Yes No   Sig: Take 3 mL by nebulization 3 (three) times a day   levothyroxine 25 mcg tablet  Outside Facility (Specify) Yes No   Sig: Take 25 mcg by mouth daily   loratadine (CLARITIN) 10 mg tablet   Yes No   Sig: Take 10 mg by mouth daily   metoprolol tartrate (LOPRESSOR) 50 mg tablet   No No   Sig: Take 2 tablets (100 mg total) by mouth every 12 (twelve) hours   nystatin (MYCOSTATIN) cream   No No   Sig: Apply topically 2 (two) times a day   Patient not taking: Reported on 2022   omeprazole (PriLOSEC) 20 mg delayed release capsule   Yes No   Sig: Take 40 mg by mouth daily   ondansetron (ZOFRAN) 4 mg tablet   No No   Sig: Take 1 tablet (4 mg total) by mouth every 8 (eight) hours as needed for nausea or vomiting   Patient not taking: Reported on 2022   polyethylene glycol (MIRALAX) 17 g packet   No No   Sig: Take 17 g by mouth daily   sodium chloride (OCEAN) 0 65 % nasal spray   No No   Si spray into each nostril as needed for congestion   spironolactone (ALDACTONE) 50 mg tablet  Outside Facility (Specify) Yes No   Sig: Take 50 mg by mouth daily   torsemide 40 MG TABS   No No   Sig: Take 40 mg by mouth 2 (two) times a day      Facility-Administered Medications: None       Allergies   Allergen Reactions    Penicillins Hives       Objective   Vitals: Temp (24hrs), Av 6 °F (37 °C), Min:98 1 °F (36 7 °C), Max:99 9 °F (37 7 °C)  Current: Temperature: 98 1 °F (36 7 °C)  Patient Vitals for the past 24 hrs:   BP Temp Temp src Pulse Resp SpO2 Weight   06/21/22 1100 105/60 -- -- (!) 108 (!) 43 94 % --   06/21/22 1000 112/60 98 1 °F (36 7 °C) Oral (!) 112 (!) 41 94 % --   06/21/22 0900 112/60 -- -- (!) 108 (!) 41 94 % --   06/21/22 0800 114/65 98 1 °F (36 7 °C) Oral (!) 110 (!) 40 93 % --   06/21/22 0600 132/68 -- -- (!) 110 (!) 41 -- --   06/21/22 0500 114/70 -- -- (!) 108 (!) 37 94 % (!) 233 kg (513 lb 10 8 oz)   06/21/22 0400 99/59 99 9 °F (37 7 °C) Axillary (!) 106 (!) 38 93 % --   06/21/22 0300 113/51 -- -- (!) 108 (!) 39 92 % --   06/21/22 0225 -- -- -- -- -- 95 % --   06/21/22 0200 101/61 -- -- (!) 106 (!) 40 95 % --   06/21/22 0100 (!) 98/47 -- -- 102 (!) 35 95 % --   06/21/22 0000 119/71 98 4 °F (36 9 °C) -- (!) 106 (!) 37 96 % --   06/20/22 2300 127/54 -- -- 96 (!) 32 99 % --   06/20/22 2200 103/54 -- -- 100 (!) 31 99 % --   06/20/22 2100 106/51 -- -- 100 (!) 26 98 % --   06/20/22 2000 102/54 98 7 °F (37 1 °C) -- 104 (!) 29 96 % --   06/20/22 1959 -- -- -- -- -- 95 % --   06/20/22 1900 107/51 -- -- 104 (!) 35 95 % --   06/20/22 1600 125/64 -- -- 104 (!) 29 98 % --   06/20/22 1529 -- -- -- -- -- 100 % --   06/20/22 1500 (!) 104/45 -- -- 96 (!) 27 100 % --   06/20/22 1400 111/54 -- -- 100 (!) 32 100 % --   06/20/22 1300 (!) 110/48 -- -- 96 (!) 25 99 % --    Body mass index is 71 64 kg/m²  Orthostatic Blood Pressures    Flowsheet Row Most Recent Value   Blood Pressure 105/60 filed at 06/21/2022 1100   Patient Position - Orthostatic VS Lying filed at 06/21/2022 1100              Invasive Devices  Report    Peripherally Inserted Central Catheter Line  Duration           PICC Line 94/85/96 Right Basilic 8 days          Drain  Duration           NG/OG/Enteral Tube Right nare 9 days                Physical Exam:  Physical Exam  Constitutional:       Appearance: He is obese  Cardiovascular:      Rate and Rhythm: Normal rate  Rhythm irregular  Heart sounds: No murmur heard  Pulmonary:      Effort: No respiratory distress     Abdominal: General: There is no distension  Skin:     General: Skin is warm  Neurological:      Mental Status: He is alert     Psychiatric:         Mood and Affect: Mood normal              Lab Results:   Results from last 7 days   Lab Units 06/21/22  0522 06/20/22  0459 06/19/22  0551 06/18/22  0424 06/17/22  2323 06/17/22  1307 06/17/22  0458 06/16/22  0406 06/15/22  1409 06/15/22  0449 06/14/22  1847 06/14/22  1339   WBC Thousand/uL 7 47 6 26 8 91 13 86* 13 21*  --  14 58* 11 53* 10 59* 9 64 9 04 5 18   HEMOGLOBIN g/dL 8 5* 7 8* 8 1* 9 6* 9 4*  --  10 2* 10 7* 12 1 11 2* 11 4* 11 7*   I STAT HEMOGLOBIN g/dl  --   --   --   --   --  10 2*  --   --   --   --   --   --    HEMATOCRIT % 27 2* 25 3* 25 8* 30 8* 30 0*  --  31 9* 33 4* 37 9 34 6* 34 9* 36 2*   HEMATOCRIT, ISTAT %  --   --   --   --   --  30*  --   --   --   --   --   --    PLATELETS Thousands/uL 170 141* 160 208 238  --  216 197 171 168 162 168   NEUTROS PCT % 70  --   --   --   --   --  83* 79* 83* 86*  --  84*   MONOS PCT % 9  --   --   --   --   --  6 10 8 7  --  7   MONO PCT %  --   --   --   --  6  --   --   --   --   --   --   --       Results from last 7 days   Lab Units 06/21/22  0522 06/20/22  0459 06/19/22  0551 06/19/22  0032 06/18/22  1539 06/18/22  0612 06/18/22  0424 06/17/22  2323 06/17/22  1724 06/17/22  1307 06/17/22  0458 06/17/22  0130 06/16/22  1833 06/16/22  1117 06/16/22  0406 06/15/22  2112 06/15/22  1409 06/15/22  0650 06/15/22  0449 06/14/22  1847 06/14/22  1339   SODIUM mmol/L 137 136 134*  --   --   --  134* 135* 135*  --  135*  --   --   --  137  --  136  --  136 135* 138   POTASSIUM mmol/L 3 5 3 3* 4 0  --   --   --  4 5 4 5 4 6  --  4 3  --   --   --  4 6  --  4 7 5 3 5 2 4 8 4 9   CHLORIDE mmol/L 99* 99* 99*  --   --   --  102 102 102  --  101  --   --   --  102  --  102  --  103 104 102   CO2 mmol/L 33* 32 31  --   --   --  29 29 31  --  32  --   --   --  31  --  28  --  32 26 26   CO2, I-STAT mmol/L  --   --   --   --   --   -- --   --   --  35*  --   --   --   --   --   --   --   --   --   --   --    BUN mg/dL 14 16 11  --   --   --  9 11 12  --  13  --   --   --  12  --  8  --  6 6 7   CREATININE mg/dL 0 50* 0 54* 0 60  --   --   --  0 64 0 74 0 75  --  0 78  --   --   --  0 90  --  0 78  --  0 65 0 62 0 65   CALCIUM mg/dL 8 3 8 4 8 2*  --   --   --  8 4 8 3 8 4  --  8 4  --   --   --  8 4  --  8 7  --  8 7 9 0 8 1*   ALK PHOS U/L  --  56 63  --   --   --  61  --   --   --   --   --   --   --   --   --   --   --  63 65 72   ALT U/L  --  13 17  --   --   --  16  --   --   --   --   --   --   --   --   --   --   --  29 32 30   AST U/L  --  13 18  --   --   --  24  --   --   --   --   --   --   --   --   --   --   --  15 19 21   GLUCOSE, ISTAT mg/dl  --   --   --   --   --   --   --   --   --  137  --   --   --   --   --   --   --   --   --   --   --    MAGNESIUM mg/dL 2 2 2 1 2 2  --   --   --  2 5  --  2 6  --  2 7*  --   --   --  2 2  --  2 3  --  2 3  --  1 7   PHOSPHORUS mg/dL 3 2 2 5* 2 6*  --   --   --  2 7  --  4 0  --  3 7  --   --   --  4 4  --  4 7*  --  4 0  --  3 5   INR   --   --   --   --   --   --   --   --   --   --   --   --   --   --   --   --   --   --  1 22*  --   --    PTT seconds 60* 79* 70* 73* 42* 38*  --  38*  --   --   --  39* 43* 35 33 29  --   --  32  --   --    EGFR ml/min/1 73sq m 125 121 116  --   --   --  113 106 106  --  104  --   --   --  98  --  104  --  112 114 112     Results from last 7 days   Lab Units 06/21/22  0522 06/20/22  0459 06/19/22  0551 06/19/22  0032 06/18/22  1539 06/18/22  0612 06/17/22  2323 06/17/22  0130 06/16/22  1833 06/16/22  1117 06/16/22  0406 06/15/22  2112 06/15/22  0449   INR   --   --   --   --   --   --   --   --   --   --   --   --  1 22*   PTT seconds 60* 79* 70* 73* 42* 38* 38* 39* 43* 35 33 29 32     Results from last 7 days   Lab Units 06/17/22 2323   LACTIC ACID mmol/L 2 0         Lab Results   Component Value Date    PHART 7 419 06/21/2022    DMY4AVX 54 0 (H) 06/21/2022    PO2ART 65 3 (L) 06/21/2022    OOZ1WWM 34 2 (H) 06/21/2022    BEART 8 5 06/21/2022    SOURCE Radial, Right 06/21/2022     No components found for: HIV1X2  No results found for: HAV, HEPAIGM, HEPBIGM, HEPBCAB, HBEAG, HEPCAB  No results found for: SPEP, UPEP   Lab Results   Component Value Date    HGBA1C 5 6 06/07/2022    HGBA1C 5 4 03/21/2022    HGBA1C 5 5 12/22/2021     No results found for: CHOL   Lab Results   Component Value Date    HDL 44 08/16/2016      Lab Results   Component Value Date    LDLCALC 80 08/16/2016      Lab Results   Component Value Date    TRIG 556 (H) 06/19/2022    TRIG 172 (H) 06/13/2022    TRIG 197 (H) 08/16/2016     No components found for: PROCAL          Imaging: I have personally reviewed pertinent reports

## 2022-06-22 LAB
ANION GAP SERPL CALCULATED.3IONS-SCNC: 3 MMOL/L (ref 4–13)
APTT PPP: 64 SECONDS (ref 23–37)
BUN SERPL-MCNC: 20 MG/DL (ref 5–25)
CA-I BLD-SCNC: 1.13 MMOL/L (ref 1.12–1.32)
CALCIUM SERPL-MCNC: 8.9 MG/DL (ref 8.3–10.1)
CHLORIDE SERPL-SCNC: 98 MMOL/L (ref 100–108)
CO2 SERPL-SCNC: 36 MMOL/L (ref 21–32)
CREAT SERPL-MCNC: 0.53 MG/DL (ref 0.6–1.3)
ERYTHROCYTE [DISTWIDTH] IN BLOOD BY AUTOMATED COUNT: 14.6 % (ref 11.6–15.1)
GFR SERPL CREATININE-BSD FRML MDRD: 122 ML/MIN/1.73SQ M
GLUCOSE SERPL-MCNC: 129 MG/DL (ref 65–140)
GLUCOSE SERPL-MCNC: 129 MG/DL (ref 65–140)
GLUCOSE SERPL-MCNC: 143 MG/DL (ref 65–140)
GLUCOSE SERPL-MCNC: 151 MG/DL (ref 65–140)
GLUCOSE SERPL-MCNC: 153 MG/DL (ref 65–140)
GLUCOSE SERPL-MCNC: 157 MG/DL (ref 65–140)
HCT VFR BLD AUTO: 31.1 % (ref 36.5–49.3)
HGB BLD-MCNC: 9.5 G/DL (ref 12–17)
MAGNESIUM SERPL-MCNC: 2.2 MG/DL (ref 1.6–2.6)
MCH RBC QN AUTO: 29.4 PG (ref 26.8–34.3)
MCHC RBC AUTO-ENTMCNC: 30.5 G/DL (ref 31.4–37.4)
MCV RBC AUTO: 96 FL (ref 82–98)
MRSA NOSE QL CULT: NORMAL
PHOSPHATE SERPL-MCNC: 4.6 MG/DL (ref 2.7–4.5)
PLATELET # BLD AUTO: 210 THOUSANDS/UL (ref 149–390)
PMV BLD AUTO: 11.5 FL (ref 8.9–12.7)
POTASSIUM SERPL-SCNC: 3.7 MMOL/L (ref 3.5–5.3)
RBC # BLD AUTO: 3.23 MILLION/UL (ref 3.88–5.62)
SODIUM SERPL-SCNC: 137 MMOL/L (ref 136–145)
TRIGL SERPL-MCNC: 124 MG/DL
WBC # BLD AUTO: 10.28 THOUSAND/UL (ref 4.31–10.16)

## 2022-06-22 PROCEDURE — 99024 POSTOP FOLLOW-UP VISIT: CPT | Performed by: SURGERY

## 2022-06-22 PROCEDURE — 94760 N-INVAS EAR/PLS OXIMETRY 1: CPT

## 2022-06-22 PROCEDURE — 84100 ASSAY OF PHOSPHORUS: CPT | Performed by: PHYSICIAN ASSISTANT

## 2022-06-22 PROCEDURE — 99233 SBSQ HOSP IP/OBS HIGH 50: CPT | Performed by: INTERNAL MEDICINE

## 2022-06-22 PROCEDURE — 80048 BASIC METABOLIC PNL TOTAL CA: CPT | Performed by: PHYSICIAN ASSISTANT

## 2022-06-22 PROCEDURE — NC001 PR NO CHARGE: Performed by: SURGERY

## 2022-06-22 PROCEDURE — 82948 REAGENT STRIP/BLOOD GLUCOSE: CPT

## 2022-06-22 PROCEDURE — 84478 ASSAY OF TRIGLYCERIDES: CPT | Performed by: PHYSICIAN ASSISTANT

## 2022-06-22 PROCEDURE — 85027 COMPLETE CBC AUTOMATED: CPT | Performed by: PHYSICIAN ASSISTANT

## 2022-06-22 PROCEDURE — 94640 AIRWAY INHALATION TREATMENT: CPT

## 2022-06-22 PROCEDURE — 2W03X6Z CHANGE PRESSURE DRESSING ON ABDOMINAL WALL: ICD-10-PCS | Performed by: SURGERY

## 2022-06-22 PROCEDURE — 82330 ASSAY OF CALCIUM: CPT | Performed by: PHYSICIAN ASSISTANT

## 2022-06-22 PROCEDURE — 85730 THROMBOPLASTIN TIME PARTIAL: CPT | Performed by: PHYSICIAN ASSISTANT

## 2022-06-22 PROCEDURE — 83735 ASSAY OF MAGNESIUM: CPT | Performed by: PHYSICIAN ASSISTANT

## 2022-06-22 PROCEDURE — 92526 ORAL FUNCTION THERAPY: CPT

## 2022-06-22 RX ORDER — POTASSIUM CHLORIDE 14.9 MG/ML
20 INJECTION INTRAVENOUS 2 TIMES DAILY
Status: COMPLETED | OUTPATIENT
Start: 2022-06-22 | End: 2022-06-22

## 2022-06-22 RX ORDER — DIGOXIN 0.25 MG/ML
125 INJECTION INTRAMUSCULAR; INTRAVENOUS DAILY
Status: DISCONTINUED | OUTPATIENT
Start: 2022-06-22 | End: 2022-06-28

## 2022-06-22 RX ADMIN — METOCLOPRAMIDE HYDROCHLORIDE 10 MG: 5 INJECTION INTRAMUSCULAR; INTRAVENOUS at 22:06

## 2022-06-22 RX ADMIN — HYDROMORPHONE HYDROCHLORIDE 1 MG: 1 INJECTION, SOLUTION INTRAMUSCULAR; INTRAVENOUS; SUBCUTANEOUS at 12:57

## 2022-06-22 RX ADMIN — POTASSIUM CHLORIDE 20 MEQ: 14.9 INJECTION, SOLUTION INTRAVENOUS at 22:13

## 2022-06-22 RX ADMIN — HYDROMORPHONE HYDROCHLORIDE 0.5 MG: 1 INJECTION, SOLUTION INTRAMUSCULAR; INTRAVENOUS; SUBCUTANEOUS at 14:42

## 2022-06-22 RX ADMIN — IPRATROPIUM BROMIDE 0.5 MG: 0.5 SOLUTION RESPIRATORY (INHALATION) at 19:54

## 2022-06-22 RX ADMIN — INSULIN LISPRO 1 UNITS: 100 INJECTION, SOLUTION INTRAVENOUS; SUBCUTANEOUS at 12:56

## 2022-06-22 RX ADMIN — IPRATROPIUM BROMIDE 0.5 MG: 0.5 SOLUTION RESPIRATORY (INHALATION) at 07:14

## 2022-06-22 RX ADMIN — DIGOXIN 125 MCG: 0.25 INJECTION INTRAMUSCULAR; INTRAVENOUS at 10:51

## 2022-06-22 RX ADMIN — HYDROMORPHONE HYDROCHLORIDE 1 MG: 1 INJECTION, SOLUTION INTRAMUSCULAR; INTRAVENOUS; SUBCUTANEOUS at 18:22

## 2022-06-22 RX ADMIN — IPRATROPIUM BROMIDE 0.5 MG: 0.5 SOLUTION RESPIRATORY (INHALATION) at 14:03

## 2022-06-22 RX ADMIN — HEPARIN SODIUM 26 UNITS/KG/HR: 10000 INJECTION, SOLUTION INTRAVENOUS at 05:35

## 2022-06-22 RX ADMIN — HYDROMORPHONE HYDROCHLORIDE 1 MG: 1 INJECTION, SOLUTION INTRAMUSCULAR; INTRAVENOUS; SUBCUTANEOUS at 07:46

## 2022-06-22 RX ADMIN — Medication: at 22:00

## 2022-06-22 RX ADMIN — CEFTRIAXONE 2000 MG: 2 INJECTION, POWDER, FOR SOLUTION INTRAMUSCULAR; INTRAVENOUS at 17:08

## 2022-06-22 RX ADMIN — HYDROMORPHONE HYDROCHLORIDE 1 MG: 1 INJECTION, SOLUTION INTRAMUSCULAR; INTRAVENOUS; SUBCUTANEOUS at 22:06

## 2022-06-22 RX ADMIN — LEVALBUTEROL HYDROCHLORIDE 1.25 MG: 1.25 SOLUTION, CONCENTRATE RESPIRATORY (INHALATION) at 07:14

## 2022-06-22 RX ADMIN — LEVALBUTEROL HYDROCHLORIDE 1.25 MG: 1.25 SOLUTION, CONCENTRATE RESPIRATORY (INHALATION) at 19:54

## 2022-06-22 RX ADMIN — POTASSIUM CHLORIDE 20 MEQ: 14.9 INJECTION, SOLUTION INTRAVENOUS at 09:35

## 2022-06-22 RX ADMIN — LEVALBUTEROL HYDROCHLORIDE 1.25 MG: 1.25 SOLUTION, CONCENTRATE RESPIRATORY (INHALATION) at 14:03

## 2022-06-22 RX ADMIN — INSULIN LISPRO 1 UNITS: 100 INJECTION, SOLUTION INTRAVENOUS; SUBCUTANEOUS at 00:23

## 2022-06-22 RX ADMIN — HEPARIN SODIUM 26 UNITS/KG/HR: 10000 INJECTION, SOLUTION INTRAVENOUS at 22:09

## 2022-06-22 RX ADMIN — BISACODYL 10 MG: 10 SUPPOSITORY RECTAL at 07:46

## 2022-06-22 RX ADMIN — HEPARIN SODIUM 26 UNITS/KG/HR: 10000 INJECTION, SOLUTION INTRAVENOUS at 14:42

## 2022-06-22 RX ADMIN — BUDESONIDE AND FORMOTEROL FUMARATE DIHYDRATE 2 PUFF: 160; 4.5 AEROSOL RESPIRATORY (INHALATION) at 07:30

## 2022-06-22 RX ADMIN — INSULIN LISPRO 1 UNITS: 100 INJECTION, SOLUTION INTRAVENOUS; SUBCUTANEOUS at 17:09

## 2022-06-22 NOTE — CODE DOCUMENTATION
Rapid response ended, KUB and CXR ordered  Patient placed on mid flow by RT replacing NRB mask  Emesis stopped at this time

## 2022-06-22 NOTE — RAPID RESPONSE
Rapid Response Note  Kathryn Piedra 47 y o  male MRN: [id number]  Unit/Bed#: [facility] 288-59 Encounter: [id number]    Rapid Response Notification(s):   Response called date/time:  6/21/2022 9:50 PM  Response team arrival date/time:  6/21/2022 9:52 PM  Response end date/time:  6/21/2022 10:15 PM  Level of care:  Stepdown 2  Rapid response location:  Stepdown unit (607)  Primary reason for rapid response call:  Acute change in O2 sat    Rapid Response Intervention(s):   Airway:  Suction  Breathing:  Oxygen  Circulation:  None  Fluids administered:  None  Medications administered:  None       Assessment:   · Hypoxia     Plan:   · Suction    · Midflow  · Maintain NGT to suction   · KUB   · CXR      Rapid Response Outcome:   Transfer:  Remain on floor  Code Status: Level 1 (Full Code)      Family notified of transfer: no  Family member contacted: no     Background/Situation:   Kathryn Piedra is a 47 y o  male admitted with small-bowel obstruction status post abdominal washout, repair of serosal tears, bridging vicryl mesh placement, wound VAC placement  Tonight had acute episode of hypoxia with vomiting  Rapid response called  NG tube placed to suction with 1700cc output  Placed on midflow with improvement in saturations  Review of Systems   Gastrointestinal: Positive for abdominal pain and nausea  All other systems reviewed and are negative  Objective:   Vitals:    06/21/22 2153 06/21/22 2153 06/21/22 2156 06/21/22 2203   BP:  145/83     BP Location:       Pulse: 105  98    Resp:       Temp:       TempSrc:       SpO2: 99%  100% 98%   Weight:       Height:         Physical Exam  Vitals and nursing note reviewed  Constitutional:       Appearance: He is obese  He is ill-appearing  HENT:      Head: Normocephalic  Right Ear: External ear normal       Left Ear: External ear normal       Nose: Nose normal    Eyes:      Pupils: Pupils are equal, round, and reactive to light     Cardiovascular:      Rate and Rhythm: Normal rate  Pulses: Normal pulses  Pulmonary:      Breath sounds: Rhonchi present  Abdominal:      Comments: Vac in place    Musculoskeletal:      Cervical back: Neck supple  Right lower leg: Edema present  Left lower leg: Edema present  Skin:     General: Skin is warm  Capillary Refill: Capillary refill takes less than 2 seconds  Neurological:      Mental Status: He is alert and oriented to person, place, and time  Psychiatric:         Mood and Affect: Mood normal          Portions of the record may have been created with voice recognition software  Occasional wrong word or "sound a like" substitutions may have occurred due to the inherent limitations of voice recognition software  Read the chart carefully and recognize, using context, where substitutions have occurred      RADHA Funes

## 2022-06-22 NOTE — PROGRESS NOTES
Reason for Consult / Principal Problem: Afib    Physician Requesting Consult:  Debi Mazariegos MD    Cardiologist: Dr Jovani Staples and Plan      Current Problem List   Principal Problem:    SBO  Active Problems:    Atrial fibrillation with RVR ([facility] )    COPD (chronic obstructive pulmonary disease) ([facility] )    Acute respiratory failure (HCC)    Assessment/Plan:  1  Permenant Atrial Fibrillation - with difficult to control rates in the setting of SBO  Due to episode of significant emesis requiring a rapid response last night, restart IV digoxin due to PO medications not being given  ? Since patient was rate controlled on digoxin would continue for now and attempt to re-introduce beta-blockers as BP allows now that patient is off pressors  ? If rates are still uncontrolled on IV digoxin, consider adding IV amiodarone  ? Continue IV heparin, would transition back to oral anticoagulation one there is no more plan for further procedures and patient can tolerate PO medication  ? Monitor telemetry  2  Chronic diastolic CHF - not in acute exacerbation,   ? Does appear to have iatrogenic fluid overload - has been on intermittent diuresis, consider additional diureses  Up approximately 8 pounds 11 days  ? -5L since 6/21/2022   3  Acute respiratory failure per primary          Subjective     CC: Afib with RVR     24 HR / Overnight Events:  A rapid response was called overnight due to a significant episode of emesis concerning for aspiration  Patient desaturated to 70% SPO2  Patient was suctioned and quickly returned to 98% on BIPAP  2L of emesis was suctioned via NG tube  Patient is now 89% on 2L NC and is NPO  PO/NG medications have been held  Heart rate is 110's to 120's at rest  Awaiting read of post rapid response CXR and KUB  Patient has no complaints this morning    Telemetry: Afib with RVR  2 episodes of 2-3 beats NSVT    Net fluid balance:  Negative 5L since yesterday   (6L urine output, 2L via NG)          Family History:   Family History   Problem Relation Age of Onset   [employer] Lung cancer Father     Diabetes Maternal Aunt     Heart attack Mother     Clotting disorder Mother     Hypertension Mother     Heart failure Mother     Diabetes Mother     Atrial fibrillation Mother     Sleep apnea Mother     Obesity Mother     Asthma Sister     Stroke Neg Hx     Anuerysm Neg Hx     Arrhythmia Neg Hx     Hyperlipidemia Neg Hx         pt unsure    Fainting Neg Hx      Historical Information   Past Medical History:   Diagnosis Date    Afib ([place] [address] )     Anemia     Anxiety     Cancer ([place] [address] )     Cardiac disease     Cellulitis     COPD (chronic obstructive pulmonary disease) ([place] [address] )     Depression     Diabetes mellitus ([place] [address] )     DVT (deep venous thrombosis) (HCC)     GERD (gastroesophageal reflux disease)     HBP (high blood pressure)     Heart failure ([place] [address] )     Hx of blood clots     Hypertension     Hypokalemia     Hypothyroid     Obesity     Psychiatric disorder      Past Surgical History:   Procedure Laterality Date    ABDOMINAL HERNIA REPAIR  05/2019    CHOLECYSTECTOMY      Lap    GASTRECTOMY SLEEVE LAPAROSCOPIC  08/2018    INCISION AND DRAINAGE OF WOUND Bilateral 12/01/2021    Procedure: INCISION AND DRAINAGE (I&D) HEAD/FACE;  Surgeon: Sri Davis MD;  Location:  MAIN OR;  Service: General    IR PICC PLACEMENT DOUBLE LUMEN  06/13/2022    IVC FILTER INSERTION  2018    KNEE SURGERY Right     open wound, injury     LAPAROTOMY N/A 6/14/2022    Procedure: LAPAROTOMY EXPLORATORY, EXTENSIVE LYSIS OF ADHESIONS, APPLICATION OF ABTHERA WOUND VAC;  Surgeon: Mayank Narvaez MD;  Location: [facility] MAIN OR;  Service: General    LAPAROTOMY N/A 6/15/2022    Procedure: LAPAROTOMY EXPLORATORY WITH REPAIR OF SEROSAL INJURY;  Surgeon: Jeremy Mejia MD;  Location:  MAIN OR;  Service: General    LEG SURGERY Right 2009    US GUIDED VASCULAR ACCESS  08/06/2018    VAC DRESSING APPLICATION N/A 3/13/4839    Procedure: CHANGE DRESSING/VAC ABDOMEN;  Surgeon: Jeremy Mejia MD;  [facility] MAIN [facility];  Service: General    VAC DRESSING APPLICATION N/A 4/42/7782    Procedure: ABDOMINAL WASHOUT, REPAIR OF SEROSAL TEARS,BRIDING VICRYL MESH, PARTIAL CLOSURE, APPLICATION OF WOUND VAC;  Surgeon: Esperanza Cast DO;  Location: [facility] MAIN OR;  Service: General     Social History   Social History     Substance and Sexual Activity   Alcohol Use Not Currently     Social History     Substance and Sexual Activity   Drug Use No     Social History     Tobacco Use   Smoking Status Former Smoker    Packs/day: 1 00    Years: 15 00    Pack years: 15 00   Smokeless Tobacco Never Used   Tobacco Comment    1 5ppd x 23 years, quit 2014     Family History:   Family History   Problem Relation Age of Onset   [employer] Lung cancer Father     Diabetes Maternal Aunt     Heart attack Mother     Clotting disorder Mother     Hypertension Mother     Heart failure Mother     Diabetes Mother     Atrial fibrillation Mother     Sleep apnea Mother     Obesity Mother     Asthma Sister     Stroke Neg Hx     Anuerysm Neg Hx     Arrhythmia Neg Hx     Hyperlipidemia Neg Hx         pt unsure    Fainting Neg Hx        Review of Systems:  Review of Systems        Scheduled Meds:  Current Facility-Administered Medications   Medication Dose Route Frequency Provider Last Rate    acetaminophen  650 mg Oral Q6H Matteo Kirkland PA-C      Adult TPN (CUSTOM BASE/CUSTOM ELECTROLYTE)   Intravenous Continuous TPN Matteo Kirkland PA-C 56 8 mL/hr at 06/21/22 2116    amiodarone  200 mg Oral TID With Meals [place] Collar JIMENA Cordoba      bisacodyl  10 mg Rectal Daily Matteo Kirkland PA-C      budesonide-formoterol  2 puff Inhalation BID Matteo Kirkland PA-C      cefTRIAXone  2,000 mg Intravenous Q24H Matteo Kirkland PA-C 2,000 mg (06/21/22 1749)    digoxin  125 mcg Per NG Tube Daily Matteo Kirkland PA-C  gabapentin  300 mg Oral TID Beau Chen, PA-C      heparin (porcine)  3-30 Units/kg/hr (Order-Specific) Intravenous Titrated Beau Chen, PA-C 26 Units/kg/hr (06/22/22 0535)    heparin (porcine)  10,000 Units Intravenous Q1H PRN Beau Chen, PA-C      heparin (porcine)  5,000 Units Intravenous Q1H PRN Beau Chen, PA-C      HYDROmorphone  1 mg Intravenous Q4H PRN Beau Chen, PA-C      insulin lispro  1-5 Units Subcutaneous Q6H [address] Alysha Cordoba, PA-C      ipratropium  0 5 mg Nebulization TID Beau Chen, PA-C      levalbuterol  1 25 mg Nebulization TID Beau Chen, PA-C      levothyroxine  25 mcg Oral Early Morning Beau Chen, PA-C      methocarbamol  500 mg Oral Q6H PRN Beau Chen, PA-C      metoclopramide  10 mg Intravenous Q6H PRN Janet Bruno MD      metoprolol tartrate  12 5 mg Oral Q12H [address] Denzil Fothergill, DO      omeprazole (PRILOSEC) suspension 2 mg/mL  20 mg Oral Early Morning Beau Chen, PA-C      oxyCODONE  10 mg Oral Q4H PRN Beau Chen, PA-C      oxyCODONE  5 mg Oral Q4H PRN Beau Chen, PA-C      polyethylene glycol  17 g Oral Daily Beau Chen, PA-C      QUEtiapine  50 mg Oral HS Beau Chen, PA-C      senna-docusate sodium  1 tablet Oral BID Beau Chen, PA-C      spironolactone  50 mg Per NG Tube Daily Hellen R Beryl, PA-C      torsemide  40 mg Oral BID Alysha Cordoba, PA-JAMES       Continuous Infusions:Adult TPN (CUSTOM BASE/CUSTOM ELECTROLYTE), , Last Rate: 56 8 mL/hr at 06/21/22 2116  heparin (porcine), 3-30 Units/kg/hr (Order-Specific), Last Rate: 26 Units/kg/hr (06/22/22 0535)      PRN Meds:   heparin (porcine)    heparin (porcine)    HYDROmorphone    methocarbamol    metoclopramide    oxyCODONE    oxyCODONE  current meds:   Current Facility-Administered Medications   Medication Dose Route Frequency    acetaminophen (TYLENOL) oral suspension 650 mg  650 mg Oral Q6H    Adult 3-in-1 TPN (custom base / custom electrolytes)   Intravenous Continuous TPN    Adult 3-in-1 TPN (custom base / custom electrolytes)   Intravenous Continuous TPN    amiodarone tablet 200 mg  200 mg Oral TID With Meals    bisacodyl (DULCOLAX) rectal suppository 10 mg  10 mg Rectal Daily    budesonide-formoterol (SYMBICORT) 160-4 5 mcg/act inhaler 2 puff  2 puff Inhalation BID    cefTRIAXone (ROCEPHIN) 2,000 mg in dextrose 5 % 50 mL IVPB  2,000 mg Intravenous Q24H    digoxin (LANOXIN) injection 125 mcg  125 mcg Intravenous Daily    gabapentin (NEURONTIN) oral solution 300 mg  300 mg Oral TID    heparin (porcine) 25,000 units in 0 45% NaCl 250 mL infusion (premix)  3-30 Units/kg/hr (Order-Specific) Intravenous Titrated    heparin (porcine) injection 10,000 Units  10,000 Units Intravenous Q1H PRN    heparin (porcine) injection 5,000 Units  5,000 Units Intravenous Q1H PRN    HYDROmorphone (DILAUDID) injection 1 mg  1 mg Intravenous Q4H PRN    insulin lispro (HumaLOG) 100 units/mL subcutaneous injection 1-5 Units  1-5 Units Subcutaneous Q6H [address]    ipratropium (ATROVENT) 0 02 % inhalation solution 0 5 mg  0 5 mg Nebulization TID    levalbuterol (XOPENEX) inhalation solution 1 25 mg  1 25 mg Nebulization TID    levothyroxine tablet 25 mcg  25 mcg Oral Early Morning    methocarbamol (ROBAXIN) tablet 500 mg  500 mg Oral Q6H PRN    metoclopramide (REGLAN) injection 10 mg  10 mg Intravenous Q6H PRN    metoprolol tartrate (LOPRESSOR) partial tablet 12 5 mg  12 5 mg Oral Q12H MARY    omeprazole (PRILOSEC) suspension 2 mg/mL  20 mg Oral Early Morning    oxyCODONE (ROXICODONE) oral solution 10 mg  10 mg Oral Q4H PRN    oxyCODONE (ROXICODONE) oral solution 5 mg  5 mg Oral Q4H PRN    polyethylene glycol (MIRALAX) packet 17 g  17 g Oral Daily    potassium chloride 20 mEq IVPB (premix)  20 mEq Intravenous BID    QUEtiapine (SEROquel) tablet 50 mg  50 mg Oral HS    senna-docusate sodium (SENOKOT S) 8 6-50 mg per tablet 1 tablet  1 tablet Oral BID    spironolactone (ALDACTONE) tablet 50 mg  50 mg Per NG Tube Daily    torsemide (DEMADEX) tablet 40 mg  40 mg Oral BID       Allergies   Allergen Reactions    Penicillins Hives       Objective   Vitals: Temp (24hrs), Av 1 °F (36 7 °C), Min:97 7 °F (36 5 °C), Max:98 3 °F (36 8 °C)  Current: Temperature: 98 1 °F (36 7 °C)  Patient Vitals for the past 24 hrs:   BP Temp Temp src Pulse Resp SpO2   22 0802 99/59 -- -- 100 -- (!) 87 %   22 0716 -- -- -- -- -- (!) 89 %   22 0656 (!) 148/118 98 1 °F (36 7 °C) -- 103 22 98 %   22 0225 149/67 98 2 °F (36 8 °C) -- 99 (!) 24 100 %   22 -- -- -- -- -- 98 %   22 -- -- -- 98 -- 100 %   22 145/83 -- -- -- -- --   22 -- -- -- 105 -- 99 %   22 -- -- -- (!) 115 -- (!) 82 %   22 -- -- -- -- -- 98 %   22 -- -- -- 66 -- (!) 83 %   22 -- -- -- 92 -- (!) 79 %   22 -- -- -- 100 -- (!) 84 %   22 1858 127/72 97 7 °F (36 5 °C) -- 100 (!) 24 95 %   22 1533 121/66 98 °F (36 7 °C) -- (!) 107 -- 95 %   22 1400 118/56 -- -- 104 (!) 34 96 %   22 1300 106/63 -- -- 102 (!) 40 96 %   22 1200 101/51 98 3 °F (36 8 °C) Oral 104 (!) 36 95 %   22 1100 105/60 -- -- (!) 108 (!) 43 94 %   22 1000 112/60 98 1 °F (36 7 °C) Oral (!) 112 (!) 41 94 %   22 0900 112/60 -- -- (!) 108 (!) 41 94 %    Body mass index is 71 64 kg/m²    Orthostatic Blood Pressures    Flowsheet Row Most Recent Value   Blood Pressure 99/59 filed at 2022 0802   Patient Position - Orthostatic VS Lying filed at 2022 1200              Invasive Devices  Report    Peripherally Inserted Central Catheter Line  Duration           PICC Line 55/97/04 Right Basilic 8 days          Drain  Duration           NG/OG/Enteral Tube Right nare 10 days                Physical Exam:  Physical Exam        Lab Results:   Results from last 7 days   Lab Units 06/22/22  0623 06/21/22  0522 06/20/22  0459 06/19/22  0551 06/18/22  0424 06/17/22  2323 06/17/22  1307 06/17/22  0458 06/16/22  0406 06/15/22  1409   WBC Thousand/uL 10 28* 7 47 6 26 8 91 13 86* 13 21*  --  14 58* 11 53* 10 59*   HEMOGLOBIN g/dL 9 5* 8 5* 7 8* 8 1* 9 6* 9 4*  --  10 2* 10 7* 12 1   I STAT HEMOGLOBIN g/dl  --   --   --   --   --   --  10 2*  --   --   --    HEMATOCRIT % 31 1* 27 2* 25 3* 25 8* 30 8* 30 0*  --  31 9* 33 4* 37 9   HEMATOCRIT, ISTAT %  --   --   --   --   --   --  30*  --   --   --    PLATELETS Thousands/uL 210 170 141* 160 208 238  --  216 197 171   NEUTROS PCT %  --  70  --   --   --   --   --  83* 79* 83*   MONOS PCT %  --  9  --   --   --   --   --  6 10 8   MONO PCT %  --   --   --   --   --  6  --   --   --   --       Results from last 7 days   Lab Units 06/22/22  0623 06/21/22  0522 06/20/22  0459 06/19/22  0551 06/19/22  0032 06/18/22  1539 06/18/22  0612 06/18/22  0424 06/17/22  2323 06/17/22  1724 06/17/22  1307 06/17/22  0458 06/17/22  0130 06/16/22  1833 06/16/22  1117 06/16/22  0406 06/15/22  2112 06/15/22  1409   SODIUM mmol/L 137 137 136 134*  --   --   --  134* 135* 135*  --  135*  --   --   --  137  --  136   POTASSIUM mmol/L 3 7 3 5 3 3* 4 0  --   --   --  4 5 4 5 4 6  --  4 3  --   --   --  4 6  --  4 7   CHLORIDE mmol/L 98* 99* 99* 99*  --   --   --  102 102 102  --  101  --   --   --  102  --  102   CO2 mmol/L 36* 33* 32 31  --   --   --  29 29 31  --  32  --   --   --  31  --  28   CO2, I-STAT mmol/L  --   --   --   --   --   --   --   --   --   --  35*  --   --   --   --   --   --   --    BUN mg/dL 20 14 16 11  --   --   --  9 11 12  --  13  --   --   --  12  --  8   CREATININE mg/dL 0 53* 0 50* 0 54* 0 60  --   --   --  0 64 0 74 0 75  --  0 78  --   --   --  0 90  --  0 78   CALCIUM mg/dL 8 9 8 3 8 4 8 2*  --   --   --  8 4 8 3 8 4  --  8 4  --   --   --  8 4  --  8 7   ALK PHOS U/L  --   --  56 63  --   --   --  61  --   --   --   --   --   --   --   --   --   --    ALT U/L  --   --  13 17  --   --   --  16  --   --   --   --   --   --   --   --   --   --    AST U/L  --   --  13 18  --   --   --  24  --   --   --   --   --   --   --   --   --   --    GLUCOSE, ISTAT mg/dl  --   --   --   --   --   --   --   --   --   --  137  --   --   --   --   --   --   --    MAGNESIUM mg/dL 2 2 2 2 2 1 2 2  --   --   --  2 5  --  2 6  --  2 7*  --   --   --  2 2  --  2 3   PHOSPHORUS mg/dL 4 6* 3 2 2 5* 2 6*  --   --   --  2 7  --  4 0  --  3 7  --   --   --  4 4  --  4 7*   PTT seconds 64* 60* 79* 70* 73* 42* 38*  --  38*  --   --   --  39* 43* 35 33 29  --    EGFR ml/min/1 73sq m 122 125 121 116  --   --   --  113 106 106  --  104  --   --   --  98  --  104     Results from last 7 days   Lab Units 06/22/22  0623 06/21/22  0522 06/20/22  0459 06/19/22  0551 06/19/22  0032 06/18/22  1539 06/18/22  0612 06/17/22  2323 06/17/22  0130 06/16/22  1833 06/16/22  1117 06/16/22  0406 06/15/22  2112   PTT seconds 64* 60* 79* 70* 73* 42* 38* 38* 39* 43* 35 33 29     Results from last 7 days   Lab Units 06/17/22 2323   LACTIC ACID mmol/L 2 0         Lab Results   Component Value Date    PHART 7 419 06/21/2022    LGZ2JLN 54 0 (H) 06/21/2022    PO2ART 65 3 (L) 06/21/2022    EKF4BBI 34 2 (H) 06/21/2022    BEART 8 5 06/21/2022    SOURCE Radial, Right 06/21/2022     No components found for: HIV1X2  No results found for: HAV, HEPAIGM, HEPBIGM, HEPBCAB, HBEAG, HEPCAB  No results found for: SPEP, UPEP   Lab Results   Component Value Date    HGBA1C 5 6 06/07/2022    HGBA1C 5 4 03/21/2022    HGBA1C 5 5 12/22/2021     No results found for: CHOL   Lab Results   Component Value Date    HDL 44 08/16/2016      Lab Results   Component Value Date    LDLCALC 80 08/16/2016      Lab Results   Component Value Date    TRIG 124 06/22/2022    TRIG 556 (H) 06/19/2022    TRIG 172 (H) 06/13/2022     No components found for: PROCAL          Imaging: I have personally reviewed pertinent reports

## 2022-06-22 NOTE — PROGRESS NOTES
TPN not meeting estimated energy needs at this time; noted plan to hold TF for now and VBS tomorrow; increased needs for wound healing noted; ncreased triglycerides @ 556 on  noted and now  within range today (presumably due to no lipids via TPN   ?); see recs below for TPN considerations until able to safely initiate PO intake and resume TF; recommendations will increase protein from 116g to 135g which can aid in mobilizing excess fluid/ edema    Recommend:    adjust TPN next baml 50% dextrose; 900ml 15% AA; 75ml 20% lipids; to provide 1965 total kcal; 375g CHO, 135g PRO; 15g FAT; 1 1 glucose infusion rate

## 2022-06-22 NOTE — QUICK NOTE
Nurse-Patient-Provider rounds were completed with the patient's nurse today, Meir Shah  We discussed the plan is to hold tube feeds at this time  Will plan for video barium swallow tomorrow on 06/23/2022  Will also plan to change VAC at bedside today  Continue p r n  Pain support  Monitor respiratory status  Currently on 2 L nasal cannula  We reviewed all of the invasive devices/lines/telemetry orders   - PICC line  - NG tube    DVT Prophylaxis:  - hep gtt and SCDs    Pain Assessment / Plan:  - Continue current analgesic regimen  Mobility Assessment / Plan:  - Activity as tolerated  Goals / Barriers for discharge:  - continue bedside VAC changes  - will need to determine definitive operative planning  - continue to monitor respiratory status  Case management following; case and discharge needs discussed  All questions and concerns were addressed  I spent greater than 9 minutes reviewing the plan with the patient and the nurse, and coordinating care for the day      Mayank Herndon PA-C  6/22/2022

## 2022-06-22 NOTE — OCCUPATIONAL THERAPY NOTE
OCCUPATIONAL THERAPY SCREEN:    ORDERS RECEIVED  CHART REVIEW COMPLETED  PER CM, PT IS A LONG TERM RESIDENT OF [facility] WHERE HE REQUIRES TOTAL ASSIST WITH ADLS AND IS PRIMARILY BED-BOUND AND UTILIZES MECHANICAL LIFT FOR TXFS  PLAN TO RETURN TO [facility] WHEN MEDICALLY CLEARED  NO ACUTE CARE OT NEEDS AT THIS TIME  D/C OT       Alex Hilton, MOT, OTR/L

## 2022-06-22 NOTE — PROGRESS NOTES
Progress Note - General Surgery   Audra Romano 47 y o  male MRN: [id number]  Unit/Bed#: [facility] 607-01 Encounter: [id number]    Assessment:  48yoM p/w SBO 2/2 chronic incarcerated ventral hernia now s/p   6/14  ex lap, HARVEY 6/14, also s/p abdominal washout, repair of serosal tears,  6/15 washout,abhera, VAC DPR  6/17 bridging vicryl mesh placement, wound vac placement     NGT 2L  Vac 400cc  10 L from 15 L midflow    Plan:  · NPO/NGT continue to suction  · Continue TPN  · VAC change bedside  · Appreciate Cards recs  · Continue heparin gtt  · Digoxin and amio po, may need IV in setting of inability to tolerate PO  · Please tigertext on call red surgery resident role or acute care floor call role with any questions      Subjective/Objective   Subjective:   Vomiting episode  NGT placed to suction  RRT called due to vomiting  Flatus + no BM  Vac with some leaking but patched  Midflow weaned down  No respiratory distress    Objective:     Blood pressure 149/67, pulse 99, temperature 98 2 °F (36 8 °C), resp  rate (!) 24, height 5' 11" (1 803 m), weight (!) 233 kg (513 lb 10 8 oz), SpO2 100 %  ,Body mass index is 71 64 kg/m²  Intake/Output Summary (Last 24 hours) at 6/22/2022 3840  Last data filed at 6/22/2022 0601  Gross per 24 hour   Intake 2727 37 ml   Output 6150 ml   Net -3422 63 ml       Invasive Devices  Report    Peripherally Inserted Central Catheter Line  Duration           PICC Line 12/67/25 Right Basilic 8 days          Drain  Duration           NG/OG/Enteral Tube Right nare 10 days                Physical Exam:   Gen:  NAD  HEENT: NCAT  MMM  CV: well perfused, pulses palpable  Lungs: Normal respiratory effort  Abd: soft, nt/nd, vac intact  Skin: warm/ dry  Extremities: no peripheral edema, no cyanosis  Neuro: AxO x3        Lab, Imaging and other studies:  I have personally reviewed pertinent lab results    , CBC:   No results found for: WBC, HGB, HCT, MCV, PLT, ADJUSTEDWBC, MCH, MCHC, RDW, MPV, NRBC, CMP: No results found for: SODIUM, K, CL, CO2, ANIONGAP, BUN, CREATININE, GLUCOSE, CALCIUM, AST, ALT, ALKPHOS, PROT, BILITOT, EGFR  VTE Pharmacologic Prophylaxis: Sequential compression device (Venodyne)  and Heparin  VTE Mechanical Prophylaxis: sequential compression device

## 2022-06-22 NOTE — PHYSICAL THERAPY NOTE
Physical Therapy Screen    Patient Name: Ruthie MARTINEZ Date: 6/22/2022     Problem List  Principal Problem:    SBO  Active Problems:    Atrial fibrillation with RVR (HCC)    COPD (chronic obstructive pulmonary disease) (HCC)    Acute respiratory failure (HCC)       Past Medical History  Past Medical History:   Diagnosis Date    [facility]ib [facility] )     Anemia     Anxiety     Cancer ([facility] )     Cardiac disease     Cellulitis     COPD (chronic obstructive pulmonary disease) ([facility] )     Depression     Diabetes mellitus ([place] [address] )     DVT (deep venous thrombosis) (HCC)     GERD (gastroesophageal reflux disease)     HBP (high blood pressure)     Heart failure (HCC)     Hx of blood clots     Hypertension     Hypokalemia     Hypothyroid     Obesity     Psychiatric disorder         Past Surgical History  Past Surgical History:   Procedure Laterality Date    ABDOMINAL HERNIA REPAIR  05/2019    CHOLECYSTECTOMY      Lap    GASTRECTOMY SLEEVE LAPAROSCOPIC  08/2018    INCISION AND DRAINAGE OF WOUND Bilateral 12/01/2021    Procedure: INCISION AND DRAINAGE (I&D) HEAD/FACE;  Surgeon: Jana Zuniga MD;  Location:  MAIN OR;  Service: General    IR PICC PLACEMENT DOUBLE LUMEN  06/13/2022    IVC FILTER INSERTION  2018    KNEE SURGERY Right     open wound, injury     LAPAROTOMY N/A 6/14/2022    Procedure: LAPAROTOMY EXPLORATORY, EXTENSIVE LYSIS OF ADHESIONS, APPLICATION OF ABTHERA WOUND VAC;  Surgeon: Debby Villanueva MD;  Location: [address] [place] [address];  Service: General    LAPAROTOMY N/A 6/15/2022    Procedure: LAPAROTOMY EXPLORATORY WITH REPAIR OF SEROSAL INJURY;  Surgeon: Marina Mccullough MD;  [facility];  Service: General    LEG SURGERY Right 2009    US GUIDED VASCULAR ACCESS  08/06/2018    VAC DRESSING APPLICATION N/A 7/29/0338    Procedure: CHANGE DRESSING/VAC ABDOMEN;  Surgeon: Marina Mccullough MD;  [facility];  Service: Mary Eduardo VAC DRESSING APPLICATION N/A 7/06/0264    Procedure: ABDOMINAL WASHOUT, REPAIR OF SEROSAL TEARS,BRIDING VICRYL MESH, PARTIAL CLOSURE, APPLICATION OF WOUND VAC;  Surgeon: Ritesh Díaz DO;  Location: [facility];  Service: General        PT orders received and chart reviewed  Per case management and patient's chart, patient is a long-term resident of the [address] [place] [address]  Patient is dependent for all ADLs, bed-bound, and utilizes a mechanical lift for transfers  Plan is for patient to return to [address] [place] [address] at time of discharge  No skilled acute care PT needs at this time  Will D/C PT    Please re-consult if needed    Nano Benitez, PT, DPT

## 2022-06-22 NOTE — PROGRESS NOTES
Acute Care Surgery  Bedside V A C  Procedure Note    A timeout was performed with the patient's nurse, Horacio Martin, prior to beginning the dressing change  The nurse remained present to confirm the correct dressing counts on removal of the VAC dressing and was debriefed at the completion of the dressing change  Location of wound: Midline Abdomen    Dressings and Foam removed:  1 Black Foam  6 White Foam    Dimensions of wound: 31 cm x 22 cm x 3 cm    Description of wound: On inspection of the wound today, mesh appreciated on top of abdominal wall  There was no necrotic or nonviable tissue requiring debridement  The periwound skin remains clean and intact and unremarkable  VAC dressing application:  The periwound skin was cleaned and dried  1 black foam, Six white foam were cut to size of the wound and placed into the wound  The dressings were then covered with VAC drape  The track pad was then placed over the base of black foam  The VAC was then set to 125 mmHg low Continuous suction  The patient tolerated the procedure well and there were no complications  The patient did not require any excisional debridement during today's dressing change  VAC settings:  125 mmHg  Continuous    Wound Images: Total VAC count  Six white  One black    Additional Notes: The [facility] sticker placed over the dressing per protocol  The next [facility] dressing change will be planned for 06/24/2022  The [facility] paperwork was completed and give to the case management staff to arrange home VAC therapy  Nursing was present for the dressing change  This dressing change took greater than 29 minutes to complete      Dedra Lazo PA-C  6/22/2022 4:32 PM

## 2022-06-22 NOTE — SPEECH THERAPY NOTE
Speech Language/Pathology    Speech/Language Pathology Progress Note    Patient Name: Serina Mantilla  MHOGE'P Date: 6/22/2022     Problem List  Principal Problem:    SBO  Active Problems:    Atrial fibrillation with RVR (HCC)    COPD (chronic obstructive pulmonary disease) (HCC)    Acute respiratory failure (HCC)       Past Medical History  Past Medical History:   Diagnosis Date    [facility]     Anemia     Anxiety     [facility]     Cardiac disease     Cellulitis     [facility]     Depression     Diabetes mellitus ([facility] )     DVT (deep venous thrombosis) (HCC)     GERD (gastroesophageal reflux disease)     HBP (high blood pressure)     Heart failure (HCC)     Hx of blood clots     Hypertension     Hypokalemia     Hypothyroid     Obesity     Psychiatric disorder         Past Surgical History  Past Surgical History:   Procedure Laterality Date    ABDOMINAL HERNIA REPAIR  05/2019    CHOLECYSTECTOMY      Lap    GASTRECTOMY SLEEVE LAPAROSCOPIC  08/2018    INCISION AND DRAINAGE OF WOUND Bilateral 12/01/2021    Procedure: INCISION AND DRAINAGE (I&D) HEAD/FACE;  Surgeon: Ihsan Mckinney MD;  Location:  MAIN OR;  Service: General    IR PICC PLACEMENT DOUBLE LUMEN  06/13/2022    IVC FILTER INSERTION  2018    KNEE SURGERY Right     open wound, injury     LAPAROTOMY N/A 6/14/2022    Procedure: LAPAROTOMY EXPLORATORY, EXTENSIVE LYSIS OF ADHESIONS, APPLICATION OF ABTHERA WOUND VAC;  Surgeon: Florinda Lin MD;  Location: [address] [place] [address];  Service: General    LAPAROTOMY N/A 6/15/2022    Procedure: LAPAROTOMY EXPLORATORY WITH REPAIR OF SEROSAL INJURY;  Surgeon: Deirdre Bowden MD;  Location:  MAIN OR;  Service: General    LEG SURGERY Right 2009    US GUIDED VASCULAR ACCESS  08/06/2018    VAC DRESSING APPLICATION N/A 7/50/2906    Procedure: CHANGE DRESSING/VAC ABDOMEN;  Surgeon: Deirdre Bowden MD;  [facility];  Service: General    VAC DRESSING APPLICATION N/A 2/30/1385    Procedure: ABDOMINAL WASHOUT, REPAIR OF SEROSAL TEARS,BRIDING VICRYL MESH, PARTIAL CLOSURE, APPLICATION OF WOUND VAC;  Surgeon: Kenny Lau DO;  [facility];  Service: General         Subjective:  Pt awake and alert in bed  S/w RN who states pt able to be given PO trials  "Water or ice chips"     Objective:  Pt seen for dx dysphagia tx f/u  Pt seen w/ puree, soft solid, thin, NT, HT and ice chips  Pt w/ WFL mastication and bolus formation and transfer  No oral residue noted  Pt w/ suspected fairly prompt swallow initiation  Pt w/ intermittent cough w/ soft solid, thin, NT, and HT, ?aspiration events vs other  No overt s/s aspiration noted w/ puree or ice chips  Pt educated on recommendation and rationale for NPO status given ongoing s/s aspiration w/ exception of ice chips after oral care  Pt verbalized understanding and awareness of aspiration risk  S/w pt regarding VBS to further assess swallow function and safety given ongoing s/s prior to diet initiation  Pt agreeable to have study completed prior to PO recommendations  Assessment:  Pt w/ s/s suggestive of mod oral and at least mod pharyngeal dysphagia   Education provided on NPO status w/ exception of ice chips and recommendation for VBS prior to diet initiation, pt agreeable at this time     Plan/Recommendations:  NPO, TF, ice chips OK  VBS when able (? Tomorrow if not to OR)   ST f/u as able and appropriate   Frequent and thorough oral care (3-4x daily)

## 2022-06-22 NOTE — PROGRESS NOTES
Patient found with suction cath in mouth  Patient vomiting s/p tube feed being started, total of approximately 100cc TF in  Patient SP02 dropped to approximately 70% SP02   Patient immediately placed on to BIPAP at 100% FIO2, and now SP02 is 98-99%

## 2022-06-23 ENCOUNTER — APPOINTMENT (INPATIENT)
Dept: RADIOLOGY | Facility: HOSPITAL | Age: 52
DRG: 230 | End: 2022-06-23
Payer: COMMERCIAL

## 2022-06-23 LAB
ANION GAP SERPL CALCULATED.3IONS-SCNC: 7 MMOL/L (ref 4–13)
APTT PPP: 88 SECONDS (ref 23–37)
BACTERIA BLD CULT: NORMAL
BACTERIA BLD CULT: NORMAL
BUN SERPL-MCNC: 19 MG/DL (ref 5–25)
CALCIUM SERPL-MCNC: 9.2 MG/DL (ref 8.3–10.1)
CHLORIDE SERPL-SCNC: 98 MMOL/L (ref 100–108)
CO2 SERPL-SCNC: 35 MMOL/L (ref 21–32)
CREAT SERPL-MCNC: 0.49 MG/DL (ref 0.6–1.3)
ERYTHROCYTE [DISTWIDTH] IN BLOOD BY AUTOMATED COUNT: 15.1 % (ref 11.6–15.1)
GFR SERPL CREATININE-BSD FRML MDRD: 126 ML/MIN/1.73SQ M
GLUCOSE SERPL-MCNC: 131 MG/DL (ref 65–140)
GLUCOSE SERPL-MCNC: 139 MG/DL (ref 65–140)
GLUCOSE SERPL-MCNC: 141 MG/DL (ref 65–140)
GLUCOSE SERPL-MCNC: 142 MG/DL (ref 65–140)
GLUCOSE SERPL-MCNC: 163 MG/DL (ref 65–140)
GLUCOSE SERPL-MCNC: 169 MG/DL (ref 65–140)
HCT VFR BLD AUTO: 28.9 % (ref 36.5–49.3)
HGB BLD-MCNC: 8.8 G/DL (ref 12–17)
MAGNESIUM SERPL-MCNC: 2.5 MG/DL (ref 1.6–2.6)
MCH RBC QN AUTO: 28.8 PG (ref 26.8–34.3)
MCHC RBC AUTO-ENTMCNC: 30.4 G/DL (ref 31.4–37.4)
MCV RBC AUTO: 94 FL (ref 82–98)
PHOSPHATE SERPL-MCNC: 3.6 MG/DL (ref 2.7–4.5)
PLATELET # BLD AUTO: 239 THOUSANDS/UL (ref 149–390)
PMV BLD AUTO: 11.7 FL (ref 8.9–12.7)
POTASSIUM SERPL-SCNC: 3.9 MMOL/L (ref 3.5–5.3)
RBC # BLD AUTO: 3.06 MILLION/UL (ref 3.88–5.62)
SODIUM SERPL-SCNC: 140 MMOL/L (ref 136–145)
WBC # BLD AUTO: 9.31 THOUSAND/UL (ref 4.31–10.16)

## 2022-06-23 PROCEDURE — 99024 POSTOP FOLLOW-UP VISIT: CPT | Performed by: SURGERY

## 2022-06-23 PROCEDURE — 80048 BASIC METABOLIC PNL TOTAL CA: CPT

## 2022-06-23 PROCEDURE — 99233 SBSQ HOSP IP/OBS HIGH 50: CPT | Performed by: INTERNAL MEDICINE

## 2022-06-23 PROCEDURE — 94640 AIRWAY INHALATION TREATMENT: CPT

## 2022-06-23 PROCEDURE — 94660 CPAP INITIATION&MGMT: CPT

## 2022-06-23 PROCEDURE — 94760 N-INVAS EAR/PLS OXIMETRY 1: CPT

## 2022-06-23 PROCEDURE — 83735 ASSAY OF MAGNESIUM: CPT

## 2022-06-23 PROCEDURE — 92526 ORAL FUNCTION THERAPY: CPT

## 2022-06-23 PROCEDURE — 74230 X-RAY XM SWLNG FUNCJ C+: CPT

## 2022-06-23 PROCEDURE — 82948 REAGENT STRIP/BLOOD GLUCOSE: CPT

## 2022-06-23 PROCEDURE — 92611 MOTION FLUOROSCOPY/SWALLOW: CPT

## 2022-06-23 PROCEDURE — 85730 THROMBOPLASTIN TIME PARTIAL: CPT | Performed by: SURGERY

## 2022-06-23 PROCEDURE — 85027 COMPLETE CBC AUTOMATED: CPT

## 2022-06-23 PROCEDURE — 84100 ASSAY OF PHOSPHORUS: CPT

## 2022-06-23 RX ORDER — METOPROLOL TARTRATE 5 MG/5ML
2.5 INJECTION INTRAVENOUS EVERY 6 HOURS
Status: DISCONTINUED | OUTPATIENT
Start: 2022-06-23 | End: 2022-06-28

## 2022-06-23 RX ORDER — ONDANSETRON 2 MG/ML
4 INJECTION INTRAMUSCULAR; INTRAVENOUS EVERY 4 HOURS PRN
Status: DISCONTINUED | OUTPATIENT
Start: 2022-06-23 | End: 2022-07-19 | Stop reason: HOSPADM

## 2022-06-23 RX ADMIN — QUETIAPINE FUMARATE 50 MG: 25 TABLET ORAL at 21:33

## 2022-06-23 RX ADMIN — METOROPROLOL TARTRATE 2.5 MG: 5 INJECTION, SOLUTION INTRAVENOUS at 10:29

## 2022-06-23 RX ADMIN — HEPARIN SODIUM 26 UNITS/KG/HR: 10000 INJECTION, SOLUTION INTRAVENOUS at 05:43

## 2022-06-23 RX ADMIN — IPRATROPIUM BROMIDE 0.5 MG: 0.5 SOLUTION RESPIRATORY (INHALATION) at 08:11

## 2022-06-23 RX ADMIN — CEFTRIAXONE 2000 MG: 2 INJECTION, POWDER, FOR SOLUTION INTRAMUSCULAR; INTRAVENOUS at 17:32

## 2022-06-23 RX ADMIN — LEVALBUTEROL HYDROCHLORIDE 1.25 MG: 1.25 SOLUTION, CONCENTRATE RESPIRATORY (INHALATION) at 14:02

## 2022-06-23 RX ADMIN — HEPARIN SODIUM 26 UNITS/KG/HR: 10000 INJECTION, SOLUTION INTRAVENOUS at 21:28

## 2022-06-23 RX ADMIN — HYDROMORPHONE HYDROCHLORIDE 1 MG: 1 INJECTION, SOLUTION INTRAMUSCULAR; INTRAVENOUS; SUBCUTANEOUS at 19:46

## 2022-06-23 RX ADMIN — METOROPROLOL TARTRATE 2.5 MG: 5 INJECTION, SOLUTION INTRAVENOUS at 17:32

## 2022-06-23 RX ADMIN — HYDROMORPHONE HYDROCHLORIDE 1 MG: 1 INJECTION, SOLUTION INTRAMUSCULAR; INTRAVENOUS; SUBCUTANEOUS at 02:56

## 2022-06-23 RX ADMIN — Medication: at 21:29

## 2022-06-23 RX ADMIN — HEPARIN SODIUM 26 UNITS/KG/HR: 10000 INJECTION, SOLUTION INTRAVENOUS at 21:30

## 2022-06-23 RX ADMIN — TORSEMIDE 40 MG: 20 TABLET ORAL at 21:33

## 2022-06-23 RX ADMIN — METOCLOPRAMIDE HYDROCHLORIDE 10 MG: 5 INJECTION INTRAMUSCULAR; INTRAVENOUS at 22:30

## 2022-06-23 RX ADMIN — BISACODYL 10 MG: 10 SUPPOSITORY RECTAL at 08:20

## 2022-06-23 RX ADMIN — LEVALBUTEROL HYDROCHLORIDE 1.25 MG: 1.25 SOLUTION, CONCENTRATE RESPIRATORY (INHALATION) at 08:11

## 2022-06-23 RX ADMIN — IPRATROPIUM BROMIDE 0.5 MG: 0.5 SOLUTION RESPIRATORY (INHALATION) at 14:02

## 2022-06-23 RX ADMIN — HYDROMORPHONE HYDROCHLORIDE 1 MG: 1 INJECTION, SOLUTION INTRAMUSCULAR; INTRAVENOUS; SUBCUTANEOUS at 12:15

## 2022-06-23 RX ADMIN — HYDROMORPHONE HYDROCHLORIDE 1 MG: 1 INJECTION, SOLUTION INTRAMUSCULAR; INTRAVENOUS; SUBCUTANEOUS at 07:28

## 2022-06-23 RX ADMIN — BUDESONIDE AND FORMOTEROL FUMARATE DIHYDRATE 2 PUFF: 160; 4.5 AEROSOL RESPIRATORY (INHALATION) at 08:22

## 2022-06-23 RX ADMIN — METOROPROLOL TARTRATE 2.5 MG: 5 INJECTION, SOLUTION INTRAVENOUS at 21:33

## 2022-06-23 RX ADMIN — OXYCODONE HYDROCHLORIDE 5 MG: 5 SOLUTION ORAL at 21:33

## 2022-06-23 RX ADMIN — IPRATROPIUM BROMIDE 0.5 MG: 0.5 SOLUTION RESPIRATORY (INHALATION) at 19:16

## 2022-06-23 RX ADMIN — HEPARIN SODIUM 26 UNITS/KG/HR: 10000 INJECTION, SOLUTION INTRAVENOUS at 14:30

## 2022-06-23 RX ADMIN — DIGOXIN 125 MCG: 0.25 INJECTION INTRAMUSCULAR; INTRAVENOUS at 08:20

## 2022-06-23 RX ADMIN — METHOCARBAMOL TABLETS 500 MG: 500 TABLET, COATED ORAL at 21:33

## 2022-06-23 RX ADMIN — METOCLOPRAMIDE HYDROCHLORIDE 10 MG: 5 INJECTION INTRAMUSCULAR; INTRAVENOUS at 02:56

## 2022-06-23 RX ADMIN — LEVALBUTEROL HYDROCHLORIDE 1.25 MG: 1.25 SOLUTION, CONCENTRATE RESPIRATORY (INHALATION) at 19:16

## 2022-06-23 NOTE — SPEECH THERAPY NOTE
Speech Language/Pathology    Speech/Language Pathology Progress Note    Patient Name: General Luis MELVINTP'A Date: 6/23/2022     Problem List  Principal Problem:    SBO  Active Problems:    Atrial fibrillation with RVR (HCC)    COPD (chronic obstructive pulmonary disease) (HCC)    Acute respiratory failure (HCC)       Past Medical History  Past Medical History:   Diagnosis Date    [facility]     Anemia     Anxiety     [facility]     Cardiac disease     Cellulitis     [facility] )     Depression     Diabetes mellitus ([facility] )     DVT (deep venous thrombosis) (HCC)     GERD (gastroesophageal reflux disease)     HBP (high blood pressure)     Heart failure (HCC)     Hx of blood clots     Hypertension     Hypokalemia     Hypothyroid     Obesity     Psychiatric disorder         Past Surgical History  Past Surgical History:   Procedure Laterality Date    ABDOMINAL HERNIA REPAIR  05/2019    CHOLECYSTECTOMY      Lap    GASTRECTOMY SLEEVE LAPAROSCOPIC  08/2018    INCISION AND DRAINAGE OF WOUND Bilateral 12/01/2021    Procedure: INCISION AND DRAINAGE (I&D) HEAD/FACE;  Surgeon: Aurea Kay MD;  Location:  MAIN OR;  Service: General    IR PICC PLACEMENT DOUBLE LUMEN  06/13/2022    IVC FILTER INSERTION  2018    KNEE SURGERY Right     open wound, injury     LAPAROTOMY N/A 6/14/2022    Procedure: LAPAROTOMY EXPLORATORY, EXTENSIVE LYSIS OF ADHESIONS, APPLICATION OF ABTHERA WOUND VAC;  Surgeon: Abimael Harper MD;  Location: [address] [place] [address];  Service: General    LAPAROTOMY N/A 6/15/2022    Procedure: LAPAROTOMY EXPLORATORY WITH REPAIR OF SEROSAL INJURY;  Surgeon: Richmond Marcos MD;  Location:  MAIN OR;  Service: General    LEG SURGERY Right 2009    US GUIDED VASCULAR ACCESS  08/06/2018    VAC DRESSING APPLICATION N/A 5/63/3713    Procedure: CHANGE DRESSING/VAC ABDOMEN;  Surgeon: Richmond Marcos MD;  [facility];  Service: General    VAC DRESSING APPLICATION N/A 7/53/3432    Procedure: ABDOMINAL WASHOUT, REPAIR OF SEROSAL TEARS,BRIDING VICRYL MESH, PARTIAL CLOSURE, APPLICATION OF WOUND VAC;  Surgeon: Dora Lozano DO;  [facility];  Service: General         Subjective:  Pt awake and alert  "Did I pass the test?"     Current Diet:  NPO    Objective:  Pt seen for dx dysphagia tx following VBS  VBS findings thoroughly reviewed w/ pt (pt declined viewing images during education)  Education provided on anatomy and physiology of swallow and his noted pharyngeal impairments on VBS  Education provided on aspiration risk and potential medical complications associated w/ aspiration  Rationale provided on dysphagia diet recommendations given pharyngeal retention, pt agreeable to described solid consistency  Pt verbalized understanding to all questions and denied questions at this time  Pt demonstrates understanding of his risk and is accepting of at least mild risk w/ diet initiation  Pt declined trials of all solid material, though requesting thin liquids  Thin liquids taken via straw without difficulty  No overt s/s aspiration  Assessment:  Pt w/ good understanding of VBS findings and recommendations, appears to well-tolerate trials of thin liquids today       Plan/Recommendations:  Mech soft w/ thin   Frequent and thorough oral care  ST f/u as able and appropriate

## 2022-06-23 NOTE — PROCEDURES
Video Swallow Study      Patient Name: Kathryn Piedra  OQJVB'S Date: 6/23/2022        Past Medical History  Past Medical History:   Diagnosis Date    [facility]     Anemia     Anxiety     Cancer ([place] [address] )     Cardiac disease     Cellulitis     [facility]     Depression     Diabetes mellitus ([place] [address] )     DVT (deep venous thrombosis) (HCC)     GERD (gastroesophageal reflux disease)     HBP (high blood pressure)     Heart failure ([place] [address] )     Hx of blood clots     Hypertension     Hypokalemia     Hypothyroid     Obesity     Psychiatric disorder         Past Surgical History  Past Surgical History:   Procedure Laterality Date    ABDOMINAL HERNIA REPAIR  05/2019    CHOLECYSTECTOMY      Lap    GASTRECTOMY SLEEVE LAPAROSCOPIC  08/2018    INCISION AND DRAINAGE OF WOUND Bilateral 12/01/2021    Procedure: INCISION AND DRAINAGE (I&D) HEAD/FACE;  Surgeon: Gus Saeed MD;  Location:  MAIN OR;  Service: General    IR PICC PLACEMENT DOUBLE LUMEN  06/13/2022    IVC FILTER INSERTION  2018    KNEE SURGERY Right     open wound, injury     LAPAROTOMY N/A 6/14/2022    Procedure: LAPAROTOMY EXPLORATORY, EXTENSIVE LYSIS OF ADHESIONS, APPLICATION OF ABTHERA WOUND VAC;  Surgeon: Indira Gomes MD;  Location: [address] [place] [address];  Service: General    LAPAROTOMY N/A 6/15/2022    Procedure: LAPAROTOMY EXPLORATORY WITH REPAIR OF SEROSAL INJURY;  Surgeon: Debborah Lundborg, MD;  [facility];  Service: General    LEG SURGERY Right 2009    US GUIDED VASCULAR ACCESS  08/06/2018    VAC DRESSING APPLICATION N/A 4/65/3611    Procedure: CHANGE DRESSING/VAC ABDOMEN;  Surgeon: Debborah Lundborg, MD;  [facility];  Service: General    VAC DRESSING APPLICATION N/A 2/22/2265    Procedure: ABDOMINAL WASHOUT, REPAIR OF SEROSAL TEARS,BRIDING VICRYL MESH, PARTIAL CLOSURE, APPLICATION OF WOUND VAC;  Surgeon: Kim Neves DO;  [facility];  Service: General       Video Barium Swallow Study    Summary:  Images are on PACS for review  Study limited 2/2 body habitus impacting complete visualization of oropharyngeal structures  Pt presents w/ mild-mod oropharyngeal dysphagia  Mastication, oral manipulation, and transfer mild-mod prolonged  All material spills to the valleculae, swallow initiation w/ mild delay  Pt w/ reduced "driving forces" for the swallow  Min hyo-laryngeal excursion, incomplete epiglottic inversion, and weak pharyngeal constriction result in mod pharyngeal retention w/ all  Transient penetration w/ thin  No gross aspiration visualized today, though unable to consistently and fully visualize trachea/vocal cords  Recommendations:  Diet: Mech soft   Liquids: Thin   Meds: Crush in puree  Strategies: Secondary swallows, liquid wash   Upright position  F/u ST tx: Yes  Therapy Prognosis: Fair   Prognosis considerations: Age, current medical, past medical   Aspiration Precautions  Reflux Precautions  Consider consult with:  -   Results reviewed with: pt, nursing, physician   Aspiration precautions posted  If a dedicated assessment of the esophagus is desired, consider esophagram/barium swallow or EGD  Patient's goal: "Ice water"       Goals:  Pt will tolerate least restrictive diet w/out s/s aspiration or oral/pharyngeal difficulties  Previous VBS:  No        Does the pt have pain? No   If yes, was nursing made aware/was it addressed? N/A       Precautions:  -     Food Allergies:  -     Current Diet:  NPO     Premorbid diet:   Regular w/ thin     Dentition:  Adequate    O2 requirement:  NC    Oral mech:  Strength and ROM: WFL     Vocal Quality/Speech:  Mild weak     Cognitive status:  Awake, alert     Consistencies administered: Puree, soft solid, hard solid, honey thick liquid, nectar thick liquid, thin liquid, barium tablet in puree, with thin   Liquids were administered/taken by straw/cup/tsp     Pt was seated laterally at 90 degrees      Oral stage:  Lip closure: wfl   Mastication: mild-mod prolonged   Bolus formation: mod prolonged   Bolus control: fair   Transfer: wfl   Residue: no     Pharyngeal stage:  Swallow promptness: mild delay   Spill to valleculae: w/ thin   Spill to pyriforms: no   Epiglottic inversion: incomplete, curled tip   Laryngeal excursion: reduced anterior and superior movement   Pharyngeal constriction: fair   Vallecular retention: mod w/ all, pill retained in valleculae for several minutes-eventually cleared w/ consecutive sips of thin w/ chin tuck   Pyriform retention: mild-mod w/ all   PPW coating: no   Osteophytes: no   CP prominence: unable to visualize 2/2 body habitus   Retropulsion from prominence: unable to visualize 2/2 body habitus   Transient penetration: inconsistent w/ thin   Epiglottic undercoat: no   Penetration: no   Aspiration: none visualized   Strategies: chin tuck cleared pill from valleculae    Screening of Esophageal stage:  Unable to visualize 2/2 body habitus

## 2022-06-23 NOTE — PROGRESS NOTES
Progress Note - General Surgery   Roseamry Gomes 47 y o  male MRN: [id number]  Unit/Bed#: [facility] 607-01 Encounter: [id number]    Assessment:  48yoM p/w SBO 2/2 chronic incarcerated ventral hernia now s/p   6/14  ex lap, HARVEY 6/14, also s/p abdominal washout, repair of serosal tears,  6/15 washout,abhera, VAC DPR  6/17 bridging vicryl mesh placement, wound vac placement     Tachy to 110s, afebrile, 3L NC  WBC 9 3 from 10 3, hemoglobin 8 8 from 9 5  Blood cultures [id number]  Creatinine 0 49    UOP 1750  NGT 1525  Abdominal     Plan:  - NPO/NGT, NGT to remain to suction for now, holding tube feeds in the setting of emesis with tube feeds yesterday  - speech on board, plan for video barium swallow today  - PICC/TPN  - abdominal VAC, monitor output and character  - cardiology on board, appreciate recommendations, currently on heparin drip  - carmen for UOP  - wean NC as tolerated  - work with PT  - pain control  - incentive spirometry    Subjective/Objective   Subjective:   Patient doing well this morning, reports minimal abdominal pain, remains NPO but worked with speech yesterday with ice chips and applesauce, denies nausea or emesis, states he is passing gas, working with PT, using incentive spirometer  Objective:     Blood pressure 118/57, pulse 98, temperature 97 6 °F (36 4 °C), resp  rate 19, height 5' 11" (1 803 m), weight (!) 230 kg (506 lb 2 8 oz), SpO2 94 %  ,Body mass index is 70 6 kg/m²        Intake/Output Summary (Last 24 hours) at 6/23/2022 0905  Last data filed at 6/23/2022 0731  Gross per 24 hour   Intake 2529 54 ml   Output 3725 ml   Net -1195 46 ml       Invasive Devices  Report    Peripherally Inserted Central Catheter Line  Duration           PICC Line 89/96/74 Right Basilic 9 days          Drain  Duration           NG/OG/Enteral Tube Right nare 11 days    External Urinary Catheter 1 day                Physical Exam:   General: NAD  Skin: Warm, dry, anicteric  HEENT: Normocephalic, atraumatic  CV: RRR, no m/r/g  Pulm: CTA b/l, no inc WOB, 3L NC  Abd: Soft, ND, minimally TTP, VAC in place draining serosanguineous output  MSK: Symmetric, no edema, no tenderness, no deformity  Neuro: AOx3, GCS 15    Lab, Imaging and other studies:  I have personally reviewed pertinent lab results    , CBC:   Lab Results   Component Value Date    WBC 9 31 06/23/2022    HGB 8 8 (L) 06/23/2022    HCT 28 9 (L) 06/23/2022    MCV 94 06/23/2022     06/23/2022    MCH 28 8 06/23/2022    MCHC 30 4 (L) 06/23/2022    RDW 15 1 06/23/2022    MPV 11 7 06/23/2022   , CMP:   Lab Results   Component Value Date    SODIUM 140 06/23/2022    K 3 9 06/23/2022    CL 98 (L) 06/23/2022    CO2 35 (H) 06/23/2022    BUN 19 06/23/2022    CREATININE 0 49 (L) 06/23/2022    CALCIUM 9 2 06/23/2022    EGFR 126 06/23/2022     VTE Pharmacologic Prophylaxis: Sequential compression device (Venodyne)  and Heparin  VTE Mechanical Prophylaxis: sequential compression device

## 2022-06-23 NOTE — PROGRESS NOTES
Reason for Consult / Principal Problem:Afib with RVR    Physician Requesting Consult:  Blanca Galaviz MD    Cardiologist: Radha Perdue        Assessment and Plan      Current Problem List   Principal Problem:    SBO  Active Problems:    Atrial fibrillation with RVR ([facility] [address] )    [facility]    Acute respiratory failure (HCC)      Assessment/Plan:  1   Permenant Atrial Fibrillation - with difficult to control rates in the setting of SBO  Continue IV digoxin due to PO medications not being given  ? Patient seems to be relatively well rate controlled since restarting IV digoxin yesterday  ? Attempt to re-introduce beta-blockers as BP allows now that patient is off pressors  ? If rates become uncontrolled on IV digoxin, consider adding IV amiodarone  ? Continue IV heparin, would transition back to oral anticoagulation one there is no more plan for further procedures and patient can tolerate PO medication  ? Monitor telemetry  2   Chronic diastolic CHF - not in acute exacerbation,   ?  Does appear to have iatrogenic fluid overload - has been on intermittent diuresis, consider additional diureses  Up approximately 8 pounds 11 days  ? -6L since 6/21/2022   3  Acute respiratory failure per primary        Subjective   CC: Afib with RVR      24 HR / Overnight Events:    Patient has been rate controlled (100's-110's) over the past 24 hours  94% on 2L NC     Patient has no complaints this morning     Telemetry: Afib with RVR  A few episodes of 2-3 beats NSVT, one episode of 15 beats NSVT      Net fluid balance:  Negative 1L since yesterday (6/22)      Labs: HGB: 8 8, PTT 88       Family History:   Family History   Problem Relation Age of Onset   [place] Lung cancer Father     Diabetes Maternal Aunt     Heart attack Mother     Clotting disorder Mother     Hypertension Mother     Heart failure Mother     Diabetes Mother     Atrial fibrillation Mother     Sleep apnea Mother  Obesity Mother     Asthma Sister     Stroke Neg Hx     Anuerysm Neg Hx     Arrhythmia Neg Hx     Hyperlipidemia Neg Hx         pt unsure    Fainting Neg Hx      Historical Information   Past Medical History:   Diagnosis Date    Afib ([place] [id number] )     Anemia     Anxiety     Cancer ([place] [address] )     Cardiac disease     Cellulitis     COPD (chronic obstructive pulmonary disease) ([place] [address] )     Depression     Diabetes mellitus ([place] [address] )     [facility]     GERD (gastroesophageal reflux disease)     HBP (high blood pressure)     Heart failure ([place] [address] )     Hx of blood clots     Hypertension     Hypokalemia     Hypothyroid     Obesity     Psychiatric disorder      Past Surgical History:   Procedure Laterality Date    ABDOMINAL HERNIA REPAIR  05/2019    CHOLECYSTECTOMY      Lap    GASTRECTOMY SLEEVE LAPAROSCOPIC  08/2018    INCISION AND DRAINAGE OF WOUND Bilateral 12/01/2021    Procedure: INCISION AND DRAINAGE (I&D) HEAD/FACE;  Surgeon: Ihsan Mckinney MD;  Location:  MAIN OR;  Service: General    IR PICC PLACEMENT DOUBLE LUMEN  06/13/2022    IVC FILTER INSERTION  2018    KNEE SURGERY Right     open wound, injury     LAPAROTOMY N/A 6/14/2022    Procedure: LAPAROTOMY EXPLORATORY, EXTENSIVE LYSIS OF ADHESIONS, APPLICATION OF ABTHERA WOUND VAC;  Surgeon: Florinda Lin MD;  Location: [address] [place] [address];  Service: General    LAPAROTOMY N/A 6/15/2022    Procedure: LAPAROTOMY EXPLORATORY WITH REPAIR OF SEROSAL INJURY;  Surgeon: Deirdre Bowden MD;  Location: [facility];  Service: General    LEG SURGERY Right 2009    US GUIDED VASCULAR ACCESS  08/06/2018    VAC DRESSING APPLICATION N/A 4/69/3792    Procedure: CHANGE DRESSING/VAC ABDOMEN;  Surgeon: Deirdre Bowden MD;  [facility];  Service: General    VAC DRESSING APPLICATION N/A 1/12/7838    Procedure: ABDOMINAL WASHOUT, REPAIR OF SEROSAL TEARS,BRIDING VICRYL MESH, PARTIAL CLOSURE, APPLICATION OF WOUND VAC;  Surgeon: Hubert Farmer Jose G Hollis DO;  [facility];  Service: General     Social History   Social History     Substance and Sexual Activity   Alcohol Use Not Currently     Social History     Substance and Sexual Activity   Drug Use No     Social History     Tobacco Use   Smoking Status Former Smoker    Packs/day: 1 00    Years: 15 00    Pack years: 15 00   Smokeless Tobacco Never Used   Tobacco Comment    1 5ppd x 23 years, quit 2014     Family History:   Family History   Problem Relation Age of Onset    Lung cancer Father     Diabetes Maternal Aunt     Heart attack Mother     Clotting disorder Mother     Hypertension Mother     Heart failure Mother     Diabetes Mother     Atrial fibrillation Mother     Sleep apnea Mother     Obesity Mother     Asthma Sister     Stroke Neg Hx     Anuerysm Neg Hx     Arrhythmia Neg Hx     Hyperlipidemia Neg Hx         pt unsure    Fainting Neg Hx        Review of Systems:  Review of Systems        Scheduled Meds:  Current Facility-Administered Medications   Medication Dose Route Frequency Provider Last Rate    acetaminophen  650 mg Oral Q6H Freddie Becerra PA-C      Adult TPN (CUSTOM BASE/CUSTOM ELECTROLYTE)   Intravenous Continuous TPN Caryl Porras MD 56 8 mL/hr at 06/22/22 2200    amiodarone  200 mg Oral TID With Meals Ricci Cordoba PA-C      bisacodyl  10 mg Rectal Daily Freddie Becerra PA-C      budesonide-formoterol  2 puff Inhalation BID Freddie Becerra PA-C      cefTRIAXone  2,000 mg Intravenous Q24H Freddie Becerra PA-C Stopped (06/23/22 0001)    digoxin  125 mcg Intravenous Daily Cici Ott DO      gabapentin  300 mg Oral TID Freddie Becerra PA-C      heparin (porcine)  3-30 Units/kg/hr (Order-Specific) Intravenous Titrated Freddie Becerra PA-C 26 Units/kg/hr (06/23/22 0543)    heparin (porcine)  10,000 Units Intravenous Q1H PRN Freddie Becerra PA-C      heparin (porcine)  5,000 Units Intravenous Q1H PRN Amanda Simmons MANPREET Cordoba PA-C      HYDROmorphone  1 mg Intravenous Q4H PRN Lilia Basilia, PA-C      insulin lispro  1-5 Units Subcutaneous Q6H [facility] Anneliese Cordoba, JIMENA      ipratropium  0 5 mg Nebulization TID Lilia Basilia, PAHOSEA      levalbuterol  1 25 mg Nebulization TID Lilia Basilia, PA-JAMES      levothyroxine  25 mcg Oral Early Morning Lilia Basilia, PA-JAMES      methocarbamol  500 mg Oral Q6H PRN Lilia Basilia, PA-JAMES      metoclopramide  10 mg Intravenous Q6H PRN Viktoriya Vega MD      metoprolol tartrate  12 5 mg Oral Q12H [facility] Akosua Heredia DO      omeprazole (PRILOSEC) suspension 2 mg/mL  20 mg Oral Early Morning Lilia Basilia, PA-JAMES      oxyCODONE  10 mg Oral Q4H PRN Lilia Basilia, PA-JAMES      oxyCODONE  5 mg Oral Q4H PRN Lilia Basilia, PA-C      polyethylene glycol  17 g Oral Daily Lilia Basilia, PA-JAMES      QUEtiapine  50 mg Oral HS Lilia Basilia, PA-C      senna-docusate sodium  1 tablet Oral BID Lilia Basilia, PA-C      spironolactone  50 mg Per NG Tube Daily Hellen R Beryl, PA-JAMES      torsemide  40 mg Oral BID Hellen R Beryl, JIMENA       Continuous Infusions:Adult TPN (CUSTOM BASE/CUSTOM ELECTROLYTE), , Last Rate: 56 8 mL/hr at 06/22/22 2200  heparin (porcine), 3-30 Units/kg/hr (Order-Specific), Last Rate: 26 Units/kg/hr (06/23/22 0543)      PRN Meds:   heparin (porcine)    heparin (porcine)    HYDROmorphone    methocarbamol    metoclopramide    oxyCODONE    oxyCODONE  all current active meds have been reviewed, current meds:   Current Facility-Administered Medications   Medication Dose Route Frequency    acetaminophen (TYLENOL) oral suspension 650 mg  650 mg Oral Q6H    Adult 3-in-1 TPN (custom base / custom electrolytes)   Intravenous Continuous TPN    amiodarone tablet 200 mg  200 mg Oral TID With Meals    bisacodyl (DULCOLAX) rectal suppository 10 mg  10 mg Rectal Daily    budesonide-formoterol (SYMBICORT) 160-4 5 mcg/act inhaler 2 puff  2 puff Inhalation BID    cefTRIAXone (ROCEPHIN) 2,000 mg in dextrose 5 % 50 mL IVPB  2,000 mg Intravenous Q24H    digoxin (LANOXIN) injection 125 mcg  125 mcg Intravenous Daily    gabapentin (NEURONTIN) oral solution 300 mg  300 mg Oral TID    heparin (porcine) 25,000 units in 0 45% NaCl 250 mL infusion (premix)  3-30 Units/kg/hr (Order-Specific) Intravenous Titrated    heparin (porcine) injection 10,000 Units  10,000 Units Intravenous Q1H PRN    heparin (porcine) injection 5,000 Units  5,000 Units Intravenous Q1H PRN    HYDROmorphone (DILAUDID) injection 1 mg  1 mg Intravenous Q4H PRN    insulin lispro (HumaLOG) 100 units/mL subcutaneous injection 1-5 Units  1-5 Units Subcutaneous Q6H [facility]    ipratropium (ATROVENT) 0 02 % inhalation solution 0 5 mg  0 5 mg Nebulization TID    levalbuterol (XOPENEX) inhalation solution 1 25 mg  1 25 mg Nebulization TID    levothyroxine tablet 25 mcg  25 mcg Oral Early Morning    methocarbamol (ROBAXIN) tablet 500 mg  500 mg Oral Q6H PRN    metoclopramide (REGLAN) injection 10 mg  10 mg Intravenous Q6H PRN    metoprolol tartrate (LOPRESSOR) partial tablet 12 5 mg  12 5 mg Oral Q12H MARY    omeprazole (PRILOSEC) suspension 2 mg/mL  20 mg Oral Early Morning    oxyCODONE (ROXICODONE) oral solution 10 mg  10 mg Oral Q4H PRN    oxyCODONE (ROXICODONE) oral solution 5 mg  5 mg Oral Q4H PRN    polyethylene glycol (MIRALAX) packet 17 g  17 g Oral Daily    QUEtiapine (SEROquel) tablet 50 mg  50 mg Oral HS    senna-docusate sodium (SENOKOT S) 8 6-50 mg per tablet 1 tablet  1 tablet Oral BID    spironolactone (ALDACTONE) tablet 50 mg  50 mg Per NG Tube Daily    torsemide (DEMADEX) tablet 40 mg  40 mg Oral BID    and PTA meds:   Prior to Admission Medications   Prescriptions Last Dose Informant Patient Reported? Taking?    Cholecalciferol (VITAMIN D) 50 MCG (2000 UT) tablet  Outside Facility (Specify) Yes No   Sig: Take 2,000 Units by mouth daily Potassium Chloride (KCL-20 PO)   Yes No   Sig: Take 20 mg by mouth in the morning   aluminum-magnesium hydroxide-simethicone (MYLANTA) 200-200-20 mg/5 mL suspension   No No   Sig: Take 30 mL by mouth every 6 (six) hours as needed for indigestion or heartburn   ammonium lactate (LAC-HYDRIN) 12 % cream   No No   Sig: Apply topically 2 (two) times a day   buPROPion (FORFIVO XL) 450 MG 24 hr tablet   No No   Sig: Take 1 tablet (450 mg total) by mouth daily   budesonide-formoterol (SYMBICORT) 160-4 5 mcg/act inhaler   Yes No   Sig: Inhale 2 puffs 2 (two) times a day Rinse mouth after use     cyanocobalamin (VITAMIN B-12) 1000 MCG tablet  Outside Facility (Specify) Yes No   Sig: Take 1,000 mcg by mouth daily   dabigatran etexilate (PRADAXA) 150 mg capsu  Outside Facility (Specify) Yes No   Sig: Take 150 mg by mouth 2 (two) times a day   dicyclomine (BENTYL) 20 mg tablet   No No   Sig: Take 1 tablet (20 mg total) by mouth 4 (four) times a day (before meals and at bedtime)   docusate sodium (COLACE) 100 mg capsule   No No   Sig: Take 1 capsule (100 mg total) by mouth 2 (two) times a day   famotidine (PEPCID) 20 mg tablet   No No   Sig: Take 1 tablet (20 mg total) by mouth daily   ipratropium-albuterol (DUO-NEB) 0 5-2 5 mg/3 mL nebulizer solution  Outside Facility (Specify) Yes No   Sig: Take 3 mL by nebulization 3 (three) times a day   levothyroxine 25 mcg tablet  Outside Facility (Specify) Yes No   Sig: Take 25 mcg by mouth daily   loratadine (CLARITIN) 10 mg tablet   Yes No   Sig: Take 10 mg by mouth daily   metoprolol tartrate (LOPRESSOR) 50 mg tablet   No No   Sig: Take 2 tablets (100 mg total) by mouth every 12 (twelve) hours   nystatin (MYCOSTATIN) cream   No No   Sig: Apply topically 2 (two) times a day   Patient not taking: Reported on 6/6/2022   omeprazole (PriLOSEC) 20 mg delayed release capsule   Yes No   Sig: Take 40 mg by mouth daily   ondansetron (ZOFRAN) 4 mg tablet   No No   Sig: Take 1 tablet (4 mg total) by mouth every 8 (eight) hours as needed for nausea or vomiting   Patient not taking: Reported on 2022   polyethylene glycol (MIRALAX) 17 g packet   No No   Sig: Take 17 g by mouth daily   sodium chloride (OCEAN) 0 65 % nasal spray   No No   Si spray into each nostril as needed for congestion   spironolactone (ALDACTONE) 50 mg tablet  Outside Facility (Specify) Yes No   Sig: Take 50 mg by mouth daily   torsemide 40 MG TABS   No No   Sig: Take 40 mg by mouth 2 (two) times a day      Facility-Administered Medications: None       Allergies   Allergen Reactions    Penicillins Hives       Objective   Vitals: Temp (24hrs), Av 3 °F (36 8 °C), Min:96 9 °F (36 1 °C), Max:99 °F (37 2 °C)  Current: Temperature: (!) 96 9 °F (36 1 °C)  Patient Vitals for the past 24 hrs:   BP Temp Pulse Resp SpO2 Weight   22 0654 118/57 (!) 96 9 °F (36 1 °C) 98 19 94 % --   22 0600 -- -- -- -- -- (!) 230 kg (506 lb 2 8 oz)   22 0234 131/83 98 4 °F (36 9 °C) (!) 112 -- 92 % --   22 0153 -- -- -- -- 93 % --   22 2201 137/84 99 °F (37 2 °C) (!) 106 16 93 % --   22 -- -- -- -- 99 % --   22 195 -- -- -- -- 99 % --   22 1845 113/61 98 9 °F (37 2 °C) (!) 110 18 93 % --   22 1404 -- -- -- -- 90 % --   22 1015 111/59 -- 98 20 92 % --   22 0938 109/59 -- 105 -- 93 % --   22 0802 99/59 -- 100 -- (!) 87 % --    Body mass index is 70 6 kg/m²    Orthostatic Blood Pressures    Flowsheet Row Most Recent Value   Blood Pressure 118/57 filed at 2022 1066   Patient Position - Orthostatic VS Lying filed at 2022 1200              Invasive Devices  Report    Peripherally Inserted Central Catheter Line  Duration           PICC Line  Right Basilic 9 days          Drain  Duration           NG/OG/Enteral Tube Right nare 11 days    External Urinary Catheter 1 day                Physical Exam:  Physical Exam        Lab Results:   Results from last 7 days   Lab Units 06/23/22  0543 06/22/22  4403 06/21/22  0522 06/20/22  0459 06/19/22  0551 06/18/22  0424 06/17/22  2323 06/17/22  1307 06/17/22  0458   WBC Thousand/uL 9 31 10 28* 7 47 6 26 8 91 13 86* 13 21*  --  14 58*   HEMOGLOBIN g/dL 8 8* 9 5* 8 5* 7 8* 8 1* 9 6* 9 4*  --  10 2*   I STAT HEMOGLOBIN g/dl  --   --   --   --   --   --   --  10 2*  --    HEMATOCRIT % 28 9* 31 1* 27 2* 25 3* 25 8* 30 8* 30 0*  --  31 9*   HEMATOCRIT, ISTAT %  --   --   --   --   --   --   --  30*  --    PLATELETS Thousands/uL 239 210 170 141* 160 208 238  --  216   NEUTROS PCT %  --   --  70  --   --   --   --   --  83*   MONOS PCT %  --   --  9  --   --   --   --   --  6   MONO PCT %  --   --   --   --   --   --  6  --   --       Results from last 7 days   Lab Units 06/23/22  0543 06/22/22  0623 06/21/22  0522 06/20/22  0459 06/19/22  0551 06/19/22  0032 06/18/22  1539 06/18/22  0612 06/18/22  0424 06/17/22  2323 06/17/22  1724 06/17/22  1307 06/17/22  0458 06/17/22  0130 06/16/22  1833 06/16/22  1117   SODIUM mmol/L  --  137 137 136 134*  --   --   --  134* 135* 135*  --  135*  --   --   --    POTASSIUM mmol/L  --  3 7 3 5 3 3* 4 0  --   --   --  4 5 4 5 4 6  --  4 3  --   --   --    CHLORIDE mmol/L  --  98* 99* 99* 99*  --   --   --  102 102 102  --  101  --   --   --    CO2 mmol/L  --  36* 33* 32 31  --   --   --  29 29 31  --  32  --   --   --    CO2, I-STAT mmol/L  --   --   --   --   --   --   --   --   --   --   --  35*  --   --   --   --    BUN mg/dL  --  20 14 16 11  --   --   --  9 11 12  --  13  --   --   --    CREATININE mg/dL  --  0 53* 0 50* 0 54* 0 60  --   --   --  0 64 0 74 0 75  --  0 78  --   --   --    CALCIUM mg/dL  --  8 9 8 3 8 4 8 2*  --   --   --  8 4 8 3 8 4  --  8 4  --   --   --    ALK PHOS U/L  --   --   --  56 63  --   --   --  61  --   --   --   --   --   --   --    ALT U/L  --   --   --  13 17  --   --   --  16  --   --   --   --   --   --   --    AST U/L  --   --   --  13 18  --   --   --  24  --   --   --   -- --   --   --    GLUCOSE, ISTAT mg/dl  --   --   --   --   --   --   --   --   --   --   --  137  --   --   --   --    MAGNESIUM mg/dL  --  2 2 2 2 2 1 2 2  --   --   --  2 5  --  2 6  --  2 7*  --   --   --    PHOSPHORUS mg/dL  --  4 6* 3 2 2 5* 2 6*  --   --   --  2 7  --  4 0  --  3 7  --   --   --    PTT seconds 88* 64* 60* 79* 70* 73* 42* 38*  --  38*  --   --   --  39* 43* 35   EGFR ml/min/1 73sq m  --  122 125 121 116  --   --   --  113 106 106  --  104  --   --   --      Results from last 7 days   Lab Units 06/23/22  0543 06/22/22  0623 06/21/22  0522 06/20/22  0459 06/19/22  0551 06/19/22  0032 06/18/22  1539 06/18/22  0612 06/17/22  2323 06/17/22  0130 06/16/22  1833 06/16/22  1117   PTT seconds 88* 64* 60* 79* 70* 73* 42* 38* 38* 39* 43* 35     Results from last 7 days   Lab Units 06/17/22  2323   LACTIC ACID mmol/L 2 0         No results found for: PHART, DQZ5AEA, PO2ART, TKI9SYR, X5JYSBXB, BEART, SOURCE  No components found for: HIV1X2  No results found for: HAV, HEPAIGM, HEPBIGM, HEPBCAB, HBEAG, HEPCAB  No results found for: SPEP, UPEP   Lab Results   Component Value Date    HGBA1C 5 6 06/07/2022    HGBA1C 5 4 03/21/2022    HGBA1C 5 5 12/22/2021     No results found for: CHOL   Lab Results   Component Value Date    HDL 44 08/16/2016      Lab Results   Component Value Date    LDLCALC 80 08/16/2016      Lab Results   Component Value Date    TRIG 124 06/22/2022    TRIG 556 (H) 06/19/2022    TRIG 172 (H) 06/13/2022     No components found for: PROCAL          Imaging: I have personally reviewed pertinent reports

## 2022-06-24 ENCOUNTER — APPOINTMENT (INPATIENT)
Dept: RADIOLOGY | Facility: HOSPITAL | Age: 52
DRG: 230 | End: 2022-06-24
Payer: COMMERCIAL

## 2022-06-24 LAB
ANION GAP SERPL CALCULATED.3IONS-SCNC: 5 MMOL/L (ref 4–13)
ANISOCYTOSIS BLD QL SMEAR: PRESENT
APTT PPP: 139 SECONDS (ref 23–37)
APTT PPP: 47 SECONDS (ref 23–37)
APTT PPP: 59 SECONDS (ref 23–37)
BASOPHILS # BLD MANUAL: 0.25 THOUSAND/UL (ref 0–0.1)
BASOPHILS NFR MAR MANUAL: 3 % (ref 0–1)
BUN SERPL-MCNC: 18 MG/DL (ref 5–25)
CALCIUM SERPL-MCNC: 9 MG/DL (ref 8.3–10.1)
CHLORIDE SERPL-SCNC: 99 MMOL/L (ref 100–108)
CO2 SERPL-SCNC: 35 MMOL/L (ref 21–32)
CREAT SERPL-MCNC: 0.47 MG/DL (ref 0.6–1.3)
EOSINOPHIL # BLD MANUAL: 0.34 THOUSAND/UL (ref 0–0.4)
EOSINOPHIL NFR BLD MANUAL: 4 % (ref 0–6)
ERYTHROCYTE [DISTWIDTH] IN BLOOD BY AUTOMATED COUNT: 15.3 % (ref 11.6–15.1)
GFR SERPL CREATININE-BSD FRML MDRD: 128 ML/MIN/1.73SQ M
GLUCOSE SERPL-MCNC: 118 MG/DL (ref 65–140)
GLUCOSE SERPL-MCNC: 119 MG/DL (ref 65–140)
GLUCOSE SERPL-MCNC: 147 MG/DL (ref 65–140)
GLUCOSE SERPL-MCNC: 149 MG/DL (ref 65–140)
GLUCOSE SERPL-MCNC: 161 MG/DL (ref 65–140)
HCT VFR BLD AUTO: 29 % (ref 36.5–49.3)
HGB BLD-MCNC: 9.1 G/DL (ref 12–17)
LYMPHOCYTES # BLD AUTO: 0.67 THOUSAND/UL (ref 0.6–4.47)
LYMPHOCYTES # BLD AUTO: 8 % (ref 14–44)
MCH RBC QN AUTO: 29.4 PG (ref 26.8–34.3)
MCHC RBC AUTO-ENTMCNC: 31.4 G/DL (ref 31.4–37.4)
MCV RBC AUTO: 94 FL (ref 82–98)
MONOCYTES # BLD AUTO: 0.17 THOUSAND/UL (ref 0–1.22)
MONOCYTES NFR BLD: 2 % (ref 4–12)
MYELOCYTES NFR BLD MANUAL: 1 % (ref 0–1)
NEUTROPHILS # BLD MANUAL: 6.91 THOUSAND/UL (ref 1.85–7.62)
NEUTS BAND NFR BLD MANUAL: 2 % (ref 0–8)
NEUTS SEG NFR BLD AUTO: 80 % (ref 43–75)
NRBC BLD AUTO-RTO: 2 /100 WBC (ref 0–2)
PLATELET # BLD AUTO: 270 THOUSANDS/UL (ref 149–390)
PLATELET BLD QL SMEAR: ADEQUATE
PMV BLD AUTO: 11 FL (ref 8.9–12.7)
POLYCHROMASIA BLD QL SMEAR: PRESENT
POTASSIUM SERPL-SCNC: 4.4 MMOL/L (ref 3.5–5.3)
RBC # BLD AUTO: 3.09 MILLION/UL (ref 3.88–5.62)
RBC MORPH BLD: PRESENT
SODIUM SERPL-SCNC: 139 MMOL/L (ref 136–145)
WBC # BLD AUTO: 8.43 THOUSAND/UL (ref 4.31–10.16)

## 2022-06-24 PROCEDURE — 94660 CPAP INITIATION&MGMT: CPT

## 2022-06-24 PROCEDURE — 82948 REAGENT STRIP/BLOOD GLUCOSE: CPT

## 2022-06-24 PROCEDURE — 94760 N-INVAS EAR/PLS OXIMETRY 1: CPT

## 2022-06-24 PROCEDURE — 94640 AIRWAY INHALATION TREATMENT: CPT

## 2022-06-24 PROCEDURE — 80048 BASIC METABOLIC PNL TOTAL CA: CPT | Performed by: NURSE PRACTITIONER

## 2022-06-24 PROCEDURE — 85730 THROMBOPLASTIN TIME PARTIAL: CPT | Performed by: SURGERY

## 2022-06-24 PROCEDURE — 2W03X6Z CHANGE PRESSURE DRESSING ON ABDOMINAL WALL: ICD-10-PCS | Performed by: SURGERY

## 2022-06-24 PROCEDURE — 85027 COMPLETE CBC AUTOMATED: CPT | Performed by: NURSE PRACTITIONER

## 2022-06-24 PROCEDURE — 71045 X-RAY EXAM CHEST 1 VIEW: CPT

## 2022-06-24 PROCEDURE — 85007 BL SMEAR W/DIFF WBC COUNT: CPT | Performed by: NURSE PRACTITIONER

## 2022-06-24 PROCEDURE — 99024 POSTOP FOLLOW-UP VISIT: CPT | Performed by: SURGERY

## 2022-06-24 PROCEDURE — 99232 SBSQ HOSP IP/OBS MODERATE 35: CPT | Performed by: INTERNAL MEDICINE

## 2022-06-24 PROCEDURE — NC001 PR NO CHARGE: Performed by: SURGERY

## 2022-06-24 RX ORDER — METOCLOPRAMIDE HYDROCHLORIDE 5 MG/ML
10 INJECTION INTRAMUSCULAR; INTRAVENOUS EVERY 6 HOURS PRN
Status: DISCONTINUED | OUTPATIENT
Start: 2022-06-24 | End: 2022-07-02

## 2022-06-24 RX ADMIN — LEVALBUTEROL HYDROCHLORIDE 1.25 MG: 1.25 SOLUTION, CONCENTRATE RESPIRATORY (INHALATION) at 13:54

## 2022-06-24 RX ADMIN — METHOCARBAMOL TABLETS 500 MG: 500 TABLET, COATED ORAL at 20:53

## 2022-06-24 RX ADMIN — METOROPROLOL TARTRATE 2.5 MG: 5 INJECTION, SOLUTION INTRAVENOUS at 03:05

## 2022-06-24 RX ADMIN — HYDROMORPHONE HYDROCHLORIDE 1 MG: 1 INJECTION, SOLUTION INTRAMUSCULAR; INTRAVENOUS; SUBCUTANEOUS at 05:43

## 2022-06-24 RX ADMIN — LEVALBUTEROL HYDROCHLORIDE 1.25 MG: 1.25 SOLUTION, CONCENTRATE RESPIRATORY (INHALATION) at 20:24

## 2022-06-24 RX ADMIN — Medication: at 20:55

## 2022-06-24 RX ADMIN — IPRATROPIUM BROMIDE 0.5 MG: 0.5 SOLUTION RESPIRATORY (INHALATION) at 13:54

## 2022-06-24 RX ADMIN — HEPARIN SODIUM 5000 UNITS: 1000 INJECTION INTRAVENOUS; SUBCUTANEOUS at 22:07

## 2022-06-24 RX ADMIN — HYDROMORPHONE HYDROCHLORIDE 1 MG: 1 INJECTION, SOLUTION INTRAMUSCULAR; INTRAVENOUS; SUBCUTANEOUS at 17:30

## 2022-06-24 RX ADMIN — HEPARIN SODIUM 26 UNITS/KG/HR: 10000 INJECTION, SOLUTION INTRAVENOUS at 06:02

## 2022-06-24 RX ADMIN — HEPARIN SODIUM 5000 UNITS: 1000 INJECTION INTRAVENOUS; SUBCUTANEOUS at 07:28

## 2022-06-24 RX ADMIN — BUDESONIDE AND FORMOTEROL FUMARATE DIHYDRATE 2 PUFF: 160; 4.5 AEROSOL RESPIRATORY (INHALATION) at 21:39

## 2022-06-24 RX ADMIN — CEFTRIAXONE 2000 MG: 2 INJECTION, POWDER, FOR SOLUTION INTRAMUSCULAR; INTRAVENOUS at 16:49

## 2022-06-24 RX ADMIN — BISACODYL 10 MG: 10 SUPPOSITORY RECTAL at 08:35

## 2022-06-24 RX ADMIN — HEPARIN SODIUM 25 UNITS/KG/HR: 10000 INJECTION, SOLUTION INTRAVENOUS at 21:41

## 2022-06-24 RX ADMIN — INSULIN LISPRO 1 UNITS: 100 INJECTION, SOLUTION INTRAVENOUS; SUBCUTANEOUS at 11:50

## 2022-06-24 RX ADMIN — HEPARIN SODIUM 28 UNITS/KG/HR: 10000 INJECTION, SOLUTION INTRAVENOUS at 13:30

## 2022-06-24 RX ADMIN — METOROPROLOL TARTRATE 2.5 MG: 5 INJECTION, SOLUTION INTRAVENOUS at 13:30

## 2022-06-24 RX ADMIN — METOROPROLOL TARTRATE 2.5 MG: 5 INJECTION, SOLUTION INTRAVENOUS at 19:58

## 2022-06-24 RX ADMIN — METOROPROLOL TARTRATE 2.5 MG: 5 INJECTION, SOLUTION INTRAVENOUS at 08:27

## 2022-06-24 RX ADMIN — DIGOXIN 125 MCG: 0.25 INJECTION INTRAMUSCULAR; INTRAVENOUS at 08:30

## 2022-06-24 RX ADMIN — HYDROMORPHONE HYDROCHLORIDE 1 MG: 1 INJECTION, SOLUTION INTRAMUSCULAR; INTRAVENOUS; SUBCUTANEOUS at 12:56

## 2022-06-24 RX ADMIN — GABAPENTIN 300 MG: 250 SOLUTION ORAL at 20:53

## 2022-06-24 RX ADMIN — HYDROMORPHONE HYDROCHLORIDE 1 MG: 1 INJECTION, SOLUTION INTRAMUSCULAR; INTRAVENOUS; SUBCUTANEOUS at 09:37

## 2022-06-24 RX ADMIN — IPRATROPIUM BROMIDE 0.5 MG: 0.5 SOLUTION RESPIRATORY (INHALATION) at 20:24

## 2022-06-24 RX ADMIN — HYDROMORPHONE HYDROCHLORIDE 1 MG: 1 INJECTION, SOLUTION INTRAMUSCULAR; INTRAVENOUS; SUBCUTANEOUS at 20:52

## 2022-06-24 RX ADMIN — QUETIAPINE FUMARATE 50 MG: 25 TABLET ORAL at 20:54

## 2022-06-24 RX ADMIN — LEVALBUTEROL HYDROCHLORIDE 1.25 MG: 1.25 SOLUTION, CONCENTRATE RESPIRATORY (INHALATION) at 07:13

## 2022-06-24 RX ADMIN — TORSEMIDE 40 MG: 20 TABLET ORAL at 20:53

## 2022-06-24 RX ADMIN — IPRATROPIUM BROMIDE 0.5 MG: 0.5 SOLUTION RESPIRATORY (INHALATION) at 07:13

## 2022-06-24 NOTE — PROGRESS NOTES
Progress Note - General Surgery   Gina Levy 47 y o  male MRN: [id number]  Unit/Bed#: [facility] 607-01 Encounter: [id number]    Assessment:  48yoM p/w SBO 2/2 chronic incarcerated ventral hernia now s/p   6/14  ex lap, HARVEY 6/14, also s/p abdominal washout, repair of serosal tears,  6/15 washout,abhera, VAC DPR  6/17 bridging vicryl mesh placement, wound vac placement     NGT 1 5 L  Vac 650 ccSS    Plan:  · NPO/NGT continue to suction  · Continue TPN  · VAC change MWF  · Appreciate Cards recs- IV dig and metoprolol while NPO/NGT  · Please tigertext on call red surgery resident role or acute care floor call role with any questions      Subjective/Objective   Subjective:   NAEO  Pain improved  No Nausea  With flatus  No BM  Objective:     Blood pressure 121/50, pulse 102, temperature 97 8 °F (36 6 °C), resp  rate 18, height 5' 11" (1 803 m), weight (!) 230 kg (506 lb 2 8 oz), SpO2 90 %  ,Body mass index is 70 6 kg/m²  Intake/Output Summary (Last 24 hours) at 6/25/2022 0651  Last data filed at 6/25/2022 0600  Gross per 24 hour   Intake 0 ml   Output 3795 ml   Net -3795 ml       Invasive Devices  Report    Peripherally Inserted Central Catheter Line  Duration           PICC Line 18/40/67 Right Basilic 11 days          Drain  Duration           External Urinary Catheter 3 days    NG/OG/Enteral Tube Nasogastric 18 Fr Right nare 1 day                Physical Exam:   Gen:  NAD  HEENT: NCAT  MMM  CV: well perfused, pulses palpable  Lungs: Normal respiratory effort  Abd: soft, nt/nd, vac intact  Skin: warm/ dry  Extremities: no peripheral edema, no cyanosis  Neuro: AxO x3      Lab, Imaging and other studies:  I have personally reviewed pertinent lab results    , CBC:   Lab Results   Component Value Date    WBC 8 59 06/25/2022    HGB 8 9 (L) 06/25/2022    HCT 29 6 (L) 06/25/2022    MCV 97 06/25/2022     06/25/2022    MCH 29 3 06/25/2022    MCHC 30 1 (L) 06/25/2022    RDW 15 2 (H) 06/25/2022    MPV 11 2 06/25/2022 , CMP:   No results found for: SODIUM, K, CL, CO2, ANIONGAP, BUN, CREATININE, GLUCOSE, CALCIUM, AST, ALT, ALKPHOS, PROT, BILITOT, EGFR  VTE Pharmacologic Prophylaxis: heparin  VTE Mechanical Prophylaxis: sequential compression device

## 2022-06-24 NOTE — UTILIZATION REVIEW
Continued Stay Review    Date: 6/24                         Current Patient Class: Inpatient  Current Level of Care: Step down    HPI:52 y o  male initially admitted on 6/14    Assessment/Plan: SBO secondary to chronic incarcerated ventral hernia, is s/p ex lap with lysis of adhesions and VAC placement on 06/14 with subsequent washout on 06/08/2015 and is now s/p 7 Days Post-Op s/p Procedure(s) (LRB):  ABDOMINAL WASHOUT, REPAIR OF SEROSAL TEARS,BRIDING VICRYL MESH, PARTIAL CLOSURE, APPLICATION OF WOUND VAC (N/A)  NGT was removed yesterday, however overnight patient had large volume emesis while on BiPAP  NG tube was placed  Patient's respiratory status remains stable  Continue TPN  NPO with sips with meds  Continue NG tube to suction  Await bowel function  Continue Iv Heparin drip, Iv antibiotics and Iv digoxin  CXR for r/o aspiration  Pain and Nausea control prn  Per Cardiology; Continued IV digoxin and metoprolol for the time being while awaiting resolution of SBO  Continued on heparin gtt in the interim as well  Continue BiPAP at night to help prevent ventricular arrhythmias  Will resume on PO amiodarone once tolerating PO meds  Continues to diurese due to iatrogenic volume overload  Vital Signs:   06/24/22 10:59:50 97 1 °F (36 2 °C) Abnormal  98 20 141/77 98 96 % -- -- -- -- -- --   06/24/22 0730 -- -- -- -- -- -- -- 36 -- 4 L/min Nasal cannula --   06/24/22 0629 -- 96 20 147/80 102 96 % -- -- -- -- -- --   06/24/22 02:56:45 96 9 °F (36 1 °C) Abnormal  97 18 128/73 91 90 % -- -- -- -- -- --   06/24/22 0200 -- -- -- -- -- -- -- 28 -- 2 L/min       Pertinent Labs/Diagnostic Results:   6/24  PCXR - Diminished lung volumes  Pulmonary edema exaggerated by vascular crowding        Results from last 7 days   Lab Units 06/24/22  0538 06/23/22  0543 06/22/22  0623 06/21/22  0522 06/20/22  0459 06/18/22  0424 06/17/22  2323   WBC Thousand/uL 8 43 9 31 10 28* 7 47 6 26   < > 13 21*   HEMOGLOBIN g/dL 9 1* 8  8* 9 5* 8 5* 7 8*   < > 9 4*   HEMATOCRIT % 29 0* 28 9* 31 1* 27 2* 25 3*   < > 30 0*   PLATELETS Thousands/uL 270 239 210 170 141*   < > 238   NEUTROS ABS Thousands/µL  --   --   --  5 28  --   --   --    BANDS PCT % 2  --   --   --   --   --  4    < > = values in this interval not displayed           Results from last 7 days   Lab Units 06/24/22  0538 06/23/22  0543 06/22/22  5771 06/21/22  0522 06/20/22  0459 06/19/22  0551 06/18/22  0424 06/18/22  0034   SODIUM mmol/L 139 140 137 137 136 134* 134*  --    POTASSIUM mmol/L 4 4 3 9 3 7 3 5 3 3* 4 0 4 5  --    CHLORIDE mmol/L 99* 98* 98* 99* 99* 99* 102  --    CO2 mmol/L 35* 35* 36* 33* 32 31 29  --    ANION GAP mmol/L 5 7 3* 5 5 4 3*  --    BUN mg/dL 18 19 20 14 16 11 9  --    CREATININE mg/dL 0 47* 0 49* 0 53* 0 50* 0 54* 0 60 0 64  --    EGFR ml/min/1 73sq m 128 126 122 125 121 116 113  --    CALCIUM mg/dL 9 0 9 2 8 9 8 3 8 4 8 2* 8 4  --    CALCIUM, IONIZED mmol/L  --   --  1 13  --  1 11* 1 04* 1 08* 1 10*   MAGNESIUM mg/dL  --  2 5 2 2 2 2 2 1 2 2 2 5  --    PHOSPHORUS mg/dL  --  3 6 4 6* 3 2 2 5* 2 6* 2 7  --      Results from last 7 days   Lab Units 06/20/22  0459 06/19/22  0551 06/18/22  0424   AST U/L 13 18 24   ALT U/L 13 17 16   ALK PHOS U/L 56 63 61   TOTAL PROTEIN g/dL 6 0* 5 7* 5 8*   ALBUMIN g/dL 2 3* 2 0* 2 3*   TOTAL BILIRUBIN mg/dL 0 70 1 46* 2 26*   BILIRUBIN DIRECT mg/dL 0 42*  --   --      Results from last 7 days   Lab Units 06/24/22  1139 06/24/22  0713 06/24/22  0601 06/23/22  2344 06/23/22  1655 06/23/22  1237 06/23/22  0653 06/23/22  0612 06/22/22  2347 06/22/22  2051 06/22/22  1650 06/22/22  1138   POC GLUCOSE mg/dl 161* 147* 119 163* 139 141* 142* 169* 129 153* 157* 151*     Results from last 7 days   Lab Units 06/24/22  0538 06/23/22  0543 06/22/22  6595 06/21/22  0522 06/20/22  0459 06/19/22  0551 06/18/22  0424 06/17/22  2323 06/17/22  1724   GLUCOSE RANDOM mg/dL 118 131 129 141* 110 192* 191* 184* 146*       Results from last 7 days Lab Units 06/21/22  0842 06/20/22  1108 06/18/22  0612   PH ART  7 419 7 379 7 323*   PCO2 ART mm Hg 54 0* 59 6* 54 1*   PO2 ART mm Hg 65 3* 118 0 38 7*   HCO3 ART mmol/L 34 2* 34 4* 27 4   BASE EXC ART mmol/L 8 5 8 1 0 8   O2 CONTENT ART mL/dL 12 3* 11 6* 10 3*   O2 HGB, ARTERIAL % 90 4* 97 2* 71 0*   ABG SOURCE  Radial, Right Radial, Right Line, Venous     Results from last 7 days   Lab Units 06/18/22  0034   PH TRACI  7 305   PCO2 TRACI mm Hg 61 2*   PO2 TRACI mm Hg 41 3   HCO3 TRACI mmol/L 29 8   BASE EXC TRACI mmol/L 2 4   O2 CONTENT TRACI ml/dL 10 8   O2 HGB, VENOUS % 72 8             Results from last 7 days   Lab Units 06/18/22  0032   HS TNI 0HR ng/L 4         Results from last 7 days   Lab Units 06/24/22  0538 06/23/22  0543 06/22/22  0623   PTT seconds 47* 88* 64*             Results from last 7 days   Lab Units 06/17/22  2323 06/17/22  1724   LACTIC ACID mmol/L 2 0 1 6         Results from last 7 days   Lab Units 06/19/22  0923   DIGOXIN LVL ng/mL 1 3         Results from last 7 days   Lab Units 06/18/22  1211   CLARITY UA  Clear   COLOR UA  [place]   SPEC GRAV UA  1 029   PH UA  6 0   GLUCOSE UA mg/dl Trace*   KETONES UA mg/dl Negative   BLOOD UA  Negative   PROTEIN UA mg/dl 70 (1+)*   NITRITE UA  Negative   BILIRUBIN UA  Small*   UROBILINOGEN UA (BE) mg/dl <2 0   LEUKOCYTES UA  Negative   WBC UA /hpf 1-2   RBC UA /hpf 4-10*   BACTERIA UA /hpf None Seen   EPITHELIAL CELLS WET PREP /hpf None Seen   MUCUS THREADS  Occasional*         Results from last 7 days   Lab Units 06/18/22  1211   BLOOD CULTURE  No Growth After 5 Days  No Growth After 5 Days     SPUTUM CULTURE  2+ Growth of Providencia stuartii*  1+ Growth of    GRAM STAIN RESULT  2+ Polys*  1+ Gram positive rods*     Medications:   Scheduled Medications:  acetaminophen, 650 mg, Oral, Q6H  bisacodyl, 10 mg, Rectal, Daily  budesonide-formoterol, 2 puff, Inhalation, BID  cefTRIAXone, 2,000 mg, Intravenous, Q24H  digoxin, 125 mcg, Intravenous, Daily  gabapentin, 300 mg, Oral, TID  insulin lispro, 1-5 Units, Subcutaneous, Q6H MARY  ipratropium, 0 5 mg, Nebulization, TID  levalbuterol, 1 25 mg, Nebulization, TID  levothyroxine, 25 mcg, Oral, Early Morning  metoprolol, 2 5 mg, Intravenous, Q6H  omeprazole (PRILOSEC) suspension 2 mg/mL, 20 mg, Oral, Early Morning  polyethylene glycol, 17 g, Oral, Daily  QUEtiapine, 50 mg, Oral, HS  senna-docusate sodium, 1 tablet, Oral, BID  spironolactone, 50 mg, Per NG Tube, Daily  torsemide, 40 mg, Oral, BID      Continuous IV Infusions:  Adult TPN (CUSTOM BASE/CUSTOM ELECTROLYTE), , Intravenous, Continuous TPN  heparin (porcine), 3-30 Units/kg/hr (Order-Specific), Intravenous, Titrated      PRN Meds:  HYDROmorphone, 1 mg, Intravenous, Q4H PRN 6/24 x3  methocarbamol, 500 mg, Oral, Q6H PRN  metoclopramide, 10 mg, Intravenous, Q6H PRN  ondansetron, 4 mg, Intravenous, Q4H PRN  oxyCODONE, 10 mg, Oral, Q4H PRN  oxyCODONE, 5 mg, Oral, Q4H PRN      Discharge Plan: D    Network Utilization Review Department  ATTENTION: Please call with any questions or concerns to [phone] and carefully listen to the prompts so that you are directed to the right person  All voicemails are confidential   [facility] all requests for admission clinical reviews, approved or denied determinations and any other requests to dedicated fax number below belonging to the campus where the patient is receiving treatment   List of dedicated fax numbers for the Facilities:  [address] [place] [address] DENIALS (Administrative/Medical Necessity) [phone]   [address] [place] [address] (Maternity/NICU/Pediatrics) 261 [facility] [place] [address] [place] [address] [place] [address] [id number] [facility] [place] [address] [address] [place] [address] [place] [address] [phone]

## 2022-06-24 NOTE — PROGRESS NOTES
Acute Care Surgery  Bedside V A C  Procedure Note    A timeout was performed with the patient's nurse, Fina Barker, prior to beginning the dressing change  The nurse remained present to confirm the correct dressing counts on removal of the VAC dressing and was debriefed at the completion of the dressing change  Location of wound:  Midline abdomen    Dressings and Foam removed:  1 Black Foam  6 White Foam    Dimensions of wound:  31 cm x 22 cm x 3 cm    Description of wound: On inspection of the wound today, meshed appreciated and there appears to be no defect  There was no necrotic or nonviable tissue requiring debridement  The periwound skin remains clean and intact and unremarkable  VAC dressing application:  The periwound skin was cleaned and dried  1 black foam, Five white foam were cut to size of the wound and placed into the wound  The dressings were then covered with VAC drape  The track pad was then placed over the base of black foam  The VAC was then set to 125 mmHg low Continuous suction  The patient tolerated the procedure well and there were no complications  The patient did not require any excisional debridement during today's dressing change  VAC settings:  125 mmHg  Continuous    Wound Images: Total VAC count:  Five white  One black    Additional Notes: The [facility] sticker placed over the dressing per protocol  The next [facility] dressing change will be planned for 06/27/2022  The [facility] paperwork was completed and give to the case management staff to arrange home VAC therapy  Resident staff was present for the dressing change  This dressing change took greater than 24 minutes to complete      Angelica Thornton PA-C  6/24/2022 2:59 PM

## 2022-06-24 NOTE — SPEECH THERAPY NOTE
Speech Language/Pathology    Pt made NPO after episode of emesis last night  Per RN, pt NPO for bowel rest and unable to participate in ST tx at this time  ST f/u as able and appropriate

## 2022-06-24 NOTE — PROGRESS NOTES
Progress Note - general Surgery  Cathi Garza 47 y o  male MRN: [id number]  Unit/Bed#: [facility] 607-01 Encounter: [id number]    Assessment:  47 y o  male who presented with SBO secondary to chronic incarcerated ventral hernia, is s/p ex lap with lysis of adhesions and VAC placement on 06/14 with subsequent washout on 06/08/2015 and is now s/p 7 Days Post-Op s/p Procedure(s) (LRB):  ABDOMINAL WASHOUT, REPAIR OF SEROSAL TEARS,BRIDING VICRYL MESH, PARTIAL CLOSURE, APPLICATION OF WOUND VAC (N/A)  NGT was removed yesterday, however overnight patient had large volume emesis while on BiPAP  NG tube was placed  Patient's respiratory status remains stable    Plan:  - Adult 3-in-1 TPN (custom base / custom electrolytes)  Diet NPO; Sips with meds  - Pain and Nausea control PRN  - Incentive spirometry  - continue NG tube to suction  - await bowel function  - plan for VAC change today  - continue heparin drip  - CXR for r/o aspiration    Subjective/Objective     Subjective:  Patient reports emesis overnight  States that he felt better once NG tube was placed  Having flatus but not yet having a bowel movement    Objective:   Vitals: Blood pressure 128/73, pulse 97, temperature (!) 96 9 °F (36 1 °C), resp  rate 18, height 5' 11" (1 803 m), weight (!) 230 kg (506 lb 2 8 oz), SpO2 90 %  ,Body mass index is 70 6 kg/m²  I/O       06/22 0701  06/23 0700 06/23 0701  06/24 0700    P  O  50 0    I V  (mL/kg) 776 8 (3 4) 750 8 (3 3)    NG/GT 50 10    IV Piggyback 300 50    TPN 1352 8 1449 2    Total Intake(mL/kg) 2529 5 (11) 2259 9 (9 8)    Urine (mL/kg/hr) 1750 (0 3) 600 (0 1)    Emesis/NG output 1525 2900    Drains 450 150    Stool  0    Total Output 3725 3650    Net -1195 5 -1390 1          Unmeasured Stool Occurrence  1 x          Physical Exam:  Gen: NAD, Aox3, Comfortable in bed  HEENT:  NG tube in place with bilious drainage 2 2 L since insertion overnight  Chest: Normal work of breathing, no respiratory distress  Abd: Soft, ND, minimally tender  Wound VAC in place to suction  Serosanguineous output 150 cc over last 24 hours  Ext: No Edema  Skin: Warm, Dry, Intact      Lab, Imaging and other studies:   I have personally reviewed pertinent reports    , CBC with diff:   Lab Results   Component Value Date    WBC 8 43 06/24/2022    HGB 9 1 (L) 06/24/2022    HCT 29 0 (L) 06/24/2022    MCV 94 06/24/2022     06/24/2022    MCH 29 4 06/24/2022    MCHC 31 4 06/24/2022    RDW 15 3 (H) 06/24/2022    MPV 11 0 06/24/2022   ,   VTE Pharmacologic Prophylaxis: Heparin  VTE Mechanical Prophylaxis: sequential compression device        Chuy Borjas MD  6/24/2022  6:15 AM

## 2022-06-24 NOTE — PROGRESS NOTES
Reason for Consult / Principal Problem: Afib with RVR    Physician Requesting Consult:  Shaniqua Olsen MD    Cardiologist: Clarissa Jimenes        Assessment and Plan      Current Problem List   Principal Problem:    SBO  Active Problems:    Atrial fibrillation with RVR ([facility] )    COPD (chronic obstructive pulmonary disease) ([facility] )    Acute respiratory failure (HCC)    Assessment/Plan:  1   Permenant Atrial Fibrillation - with difficult to control rates in the setting of SBO  Continue IV digoxin and metoprolol due to PO medications not being given  ? Patient seems to be relatively well rate controlled on the IV digoxin and metoprolol  ? Blood pressure is doing well on the metoprolol  ? D/c order for PO Amiodarone since his rate is well controlled on digoxin and metoprolol  ? Continue IV heparin, would transition back to oral anticoagulation one there is no more plan for further procedures and patient can tolerate PO medication    ? Monitor telemetry  2   Chronic diastolic CHF - not in acute exacerbation,   ?  Does appear to have iatrogenic fluid overload - has been on intermittent diuresis, consider additional diureses  ? -7 5L since 6/21/2022   3  Acute respiratory failure per primary         Subjective     CC: Afib with RVR      24 HR / Overnight Events:     Patient has been better rate controlled (90's-100's) over the past 24 hours  NG tube was pulled last night but patient had emesis so the NG was reinserted and 100mL was suctioned immediately from the NG tube     96% on 4L NC     Patient has no complaints this morning    He wore his BiPAP the entire time he slept last night      Telemetry: Afib with and without RVR  A few episodes of 2-3 beats NSVT      Net fluid balance:  Negative 1 4L since yesterday (6/23)       Labs: HGB: 9 1, PTT 47, K+: 4 4            Family History:   Family History   Problem Relation Age of Onset    Lung cancer Father     Diabetes Maternal Aunt     Heart attack Mother    [facility] Clotting disorder Mother     Hypertension Mother     Heart failure Mother     Diabetes Mother     Atrial fibrillation Mother     Sleep apnea Mother     Obesity Mother     Asthma Sister     Stroke Neg Hx     Anuerysm Neg Hx     Arrhythmia Neg Hx     Hyperlipidemia Neg Hx         pt unsure    Fainting Neg Hx      Historical Information   Past Medical History:   Diagnosis Date    Afib ([place] [address] )     Anemia     Anxiety     Cancer ([place] [address] )     Cardiac disease     Cellulitis     COPD (chronic obstructive pulmonary disease) ([place] [address] )     Depression     Diabetes mellitus ([place] [address] )     [facility] thrombosis[facility]     GERD (gastroesophageal reflux disease)     HBP (high blood pressure)     Heart failure ([place] [address] )     Hx of blood clots     Hypertension     Hypokalemia     Hypothyroid     Obesity     Psychiatric disorder      Past Surgical History:   Procedure Laterality Date    ABDOMINAL HERNIA REPAIR  05/2019    CHOLECYSTECTOMY      Lap    GASTRECTOMY SLEEVE LAPAROSCOPIC  08/2018    INCISION AND DRAINAGE OF WOUND Bilateral 12/01/2021    Procedure: INCISION AND DRAINAGE (I&D) HEAD/FACE;  Surgeon: Chrissie Sullivan MD;  Location:  MAIN OR;  Service: General    IR PICC PLACEMENT DOUBLE LUMEN  06/13/2022    IVC FILTER INSERTION  2018    KNEE SURGERY Right     open wound, injury     LAPAROTOMY N/A 6/14/2022    Procedure: LAPAROTOMY EXPLORATORY, EXTENSIVE LYSIS OF ADHESIONS, APPLICATION OF ABTHERA WOUND VAC;  Surgeon: Stacie Gan MD;  Location: [address] [place] [address];  Service: General    LAPAROTOMY N/A 6/15/2022    Procedure: LAPAROTOMY EXPLORATORY WITH REPAIR OF SEROSAL INJURY;  Surgeon: Rachel Parikh MD;  Location:  MAIN OR;  Service: General    LEG SURGERY Right 2009    US GUIDED VASCULAR ACCESS  08/06/2018    VAC DRESSING APPLICATION N/A 2/09/5727    Procedure: CHANGE DRESSING/VAC ABDOMEN;  Surgeon: Rachel Parikh MD;  [facility];  Service: General    VAC DRESSING APPLICATION N/A 6/17/2022    Procedure: ABDOMINAL WASHOUT, REPAIR OF SEROSAL TEARS,BRIDING VICRYL MESH, PARTIAL CLOSURE, APPLICATION OF WOUND VAC;  Surgeon: Xena Yap DO;  Location[facility];  Service: General     Social History   Social History     Substance and Sexual Activity   Alcohol Use Not Currently     Social History     Substance and Sexual Activity   Drug Use No     Social History     Tobacco Use   Smoking Status Former Smoker    Packs/day: 1 00    Years: 15 00    Pack years: 15 00   Smokeless Tobacco Never Used   Tobacco Comment    1 5ppd x 23 years, quit 2014     Family History:   Family History   Problem Relation Age of Onset   [facility] Lung cancer Father     Diabetes Maternal Aunt     Heart attack Mother     Clotting disorder Mother     Hypertension Mother     Heart failure Mother     Diabetes Mother     Atrial fibrillation Mother     Sleep apnea Mother     Obesity Mother     Asthma Sister     Stroke Neg Hx     Anuerysm Neg Hx     Arrhythmia Neg Hx     Hyperlipidemia Neg Hx         pt unsure    Fainting Neg Hx        Review of Systems:  Review of Systems        Scheduled Meds:  Current Facility-Administered Medications   Medication Dose Route Frequency Provider Last Rate    acetaminophen  650 mg Oral Q6H Hellen Cordoba PA-C      Adult TPN (CUSTOM BASE/CUSTOM ELECTROLYTE)   Intravenous Continuous TPN RADHA Dunn 75 5 mL/hr at 06/23/22 2129    amiodarone  200 mg Oral TID With Meals [place] Cordoba PA-C      bisacodyl  10 mg Rectal Daily Mckayla Hutchinson PA-C      budesonide-formoterol  2 puff Inhalation BID Mckayla Hutchinson PA-C      cefTRIAXone  2,000 mg Intravenous Q24H Mckayla Hutchinson PA-C Stopped (06/24/22 0201)    digoxin  125 mcg Intravenous Daily Robert Ivy DO      gabapentin  300 mg Oral TID Mckayla Hutchinson PA-C      heparin (porcine)  3-30 Units/kg/hr (Order-Specific) Intravenous Titrated Mckayla Hutchinson PA-C 28 Units/kg/hr (06/24/22 7732)    heparin (porcine)  10,000 Units Intravenous Q1H PRN Anice Pall, PA-C      heparin (porcine)  5,000 Units Intravenous Q1H PRN Anice Pall, PA-C      HYDROmorphone  1 mg Intravenous Q4H PRN Anice Pall, PA-C      insulin lispro  1-5 Units Subcutaneous Q6H [address] Arelis Cordoba, PA-C      ipratropium  0 5 mg Nebulization TID Anice Pall, PA-C      levalbuterol  1 25 mg Nebulization TID Anice Pall, PA-C      levothyroxine  25 mcg Oral Early Morning Anice Pall, PA-C      methocarbamol  500 mg Oral Q6H PRN Anice Pall, PA-C      metoclopramide  10 mg Intravenous Q6H PRN Dayanara Vasquez MD      metoprolol  2 5 mg Intravenous Q6H Jolie Rock,       omeprazole (PRILOSEC) suspension 2 mg/mL  20 mg Oral Early Morning Anice Pall, PA-C      ondansetron  4 mg Intravenous Q4H PRN Omkar Sauceda MD      oxyCODONE  10 mg Oral Q4H PRN Anice Pall, PA-C      oxyCODONE  5 mg Oral Q4H PRN Anice Pall, PA-C      polyethylene glycol  17 g Oral Daily Anice Pall, PA-C      QUEtiapine  50 mg Oral HS Anice Pall, PA-C      senna-docusate sodium  1 tablet Oral BID Anice Pall, PA-C      spironolactone  50 mg Per NG Tube Daily Hellen MANPREET Cordoba, PA-C      torsemide  40 mg Oral BID Arelis Cordoba, PA-JAMES       Continuous Infusions:Adult TPN (CUSTOM BASE/CUSTOM ELECTROLYTE), , Last Rate: 75 5 mL/hr at 06/23/22 2129  heparin (porcine), 3-30 Units/kg/hr (Order-Specific), Last Rate: 28 Units/kg/hr (06/24/22 0728)      PRN Meds:   heparin (porcine)    heparin (porcine)    HYDROmorphone    methocarbamol    metoclopramide    ondansetron    oxyCODONE    oxyCODONE  all current active meds have been reviewed, current meds:   Current Facility-Administered Medications   Medication Dose Route Frequency    acetaminophen (TYLENOL) oral suspension 650 mg  650 mg Oral Q6H    Adult 3-in-1 TPN (custom base / custom electrolytes)   Intravenous Continuous TPN    amiodarone tablet 200 mg  200 mg Oral TID With Meals    bisacodyl (DULCOLAX) rectal suppository 10 mg  10 mg Rectal Daily    budesonide-formoterol (SYMBICORT) 160-4 5 mcg/act inhaler 2 puff  2 puff Inhalation BID    cefTRIAXone (ROCEPHIN) 2,000 mg in dextrose 5 % 50 mL IVPB  2,000 mg Intravenous Q24H    digoxin (LANOXIN) injection 125 mcg  125 mcg Intravenous Daily    gabapentin (NEURONTIN) oral solution 300 mg  300 mg Oral TID    heparin (porcine) 25,000 units in 0 45% NaCl 250 mL infusion (premix)  3-30 Units/kg/hr (Order-Specific) Intravenous Titrated    heparin (porcine) injection 10,000 Units  10,000 Units Intravenous Q1H PRN    heparin (porcine) injection 5,000 Units  5,000 Units Intravenous Q1H PRN    HYDROmorphone (DILAUDID) injection 1 mg  1 mg Intravenous Q4H PRN    insulin lispro (HumaLOG) 100 units/mL subcutaneous injection 1-5 Units  1-5 Units Subcutaneous Q6H [address]    ipratropium (ATROVENT) 0 02 % inhalation solution 0 5 mg  0 5 mg Nebulization TID    levalbuterol (XOPENEX) inhalation solution 1 25 mg  1 25 mg Nebulization TID    levothyroxine tablet 25 mcg  25 mcg Oral Early Morning    methocarbamol (ROBAXIN) tablet 500 mg  500 mg Oral Q6H PRN    metoclopramide (REGLAN) injection 10 mg  10 mg Intravenous Q6H PRN    metoprolol (LOPRESSOR) injection 2 5 mg  2 5 mg Intravenous Q6H    omeprazole (PRILOSEC) suspension 2 mg/mL  20 mg Oral Early Morning    ondansetron (ZOFRAN) injection 4 mg  4 mg Intravenous Q4H PRN    oxyCODONE (ROXICODONE) oral solution 10 mg  10 mg Oral Q4H PRN    oxyCODONE (ROXICODONE) oral solution 5 mg  5 mg Oral Q4H PRN    polyethylene glycol (MIRALAX) packet 17 g  17 g Oral Daily    QUEtiapine (SEROquel) tablet 50 mg  50 mg Oral HS    senna-docusate sodium (SENOKOT S) 8 6-50 mg per tablet 1 tablet  1 tablet Oral BID    spironolactone (ALDACTONE) tablet 50 mg  50 mg Per NG Tube Daily    torsemide (DEMADEX) tablet 40 mg  40 mg Oral BID    and PTA meds:   Prior to Admission Medications   Prescriptions Last Dose Informant Patient Reported? Taking? Cholecalciferol (VITAMIN D) 50 MCG (2000 UT) tablet  Outside Facility (Specify) Yes No   Sig: Take 2,000 Units by mouth daily   Potassium Chloride (KCL-20 PO)   Yes No   Sig: Take 20 mg by mouth in the morning   aluminum-magnesium hydroxide-simethicone (MYLANTA) 200-200-20 mg/5 mL suspension   No No   Sig: Take 30 mL by mouth every 6 (six) hours as needed for indigestion or heartburn   ammonium lactate (LAC-HYDRIN) 12 % cream   No No   Sig: Apply topically 2 (two) times a day   buPROPion (FORFIVO XL) 450 MG 24 hr tablet   No No   Sig: Take 1 tablet (450 mg total) by mouth daily   budesonide-formoterol (SYMBICORT) 160-4 5 mcg/act inhaler   Yes No   Sig: Inhale 2 puffs 2 (two) times a day Rinse mouth after use     cyanocobalamin (VITAMIN B-12) 1000 MCG tablet  Outside Facility (Specify) Yes No   Sig: Take 1,000 mcg by mouth daily   dabigatran etexilate (PRADAXA) 150 mg capsu  Outside Facility (Specify) Yes No   Sig: Take 150 mg by mouth 2 (two) times a day   dicyclomine (BENTYL) 20 mg tablet   No No   Sig: Take 1 tablet (20 mg total) by mouth 4 (four) times a day (before meals and at bedtime)   docusate sodium (COLACE) 100 mg capsule   No No   Sig: Take 1 capsule (100 mg total) by mouth 2 (two) times a day   famotidine (PEPCID) 20 mg tablet   No No   Sig: Take 1 tablet (20 mg total) by mouth daily   ipratropium-albuterol (DUO-NEB) 0 5-2 5 mg/3 mL nebulizer solution  Outside Facility (Specify) Yes No   Sig: Take 3 mL by nebulization 3 (three) times a day   levothyroxine 25 mcg tablet  Outside Facility (Specify) Yes No   Sig: Take 25 mcg by mouth daily   loratadine (CLARITIN) 10 mg tablet   Yes No   Sig: Take 10 mg by mouth daily   metoprolol tartrate (LOPRESSOR) 50 mg tablet   No No   Sig: Take 2 tablets (100 mg total) by mouth every 12 (twelve) hours   nystatin (MYCOSTATIN) cream   No No   Sig: Apply topically 2 (two) times a day   Patient not taking: Reported on 2022   omeprazole (PriLOSEC) 20 mg delayed release capsule   Yes No   Sig: Take 40 mg by mouth daily   ondansetron (ZOFRAN) 4 mg tablet   No No   Sig: Take 1 tablet (4 mg total) by mouth every 8 (eight) hours as needed for nausea or vomiting   Patient not taking: Reported on 2022   polyethylene glycol (MIRALAX) 17 g packet   No No   Sig: Take 17 g by mouth daily   sodium chloride (OCEAN) 0 65 % nasal spray   No No   Si spray into each nostril as needed for congestion   spironolactone (ALDACTONE) 50 mg tablet  Outside Facility (Specify) Yes No   Sig: Take 50 mg by mouth daily   torsemide 40 MG TABS   No No   Sig: Take 40 mg by mouth 2 (two) times a day      Facility-Administered Medications: None       Allergies   Allergen Reactions    Penicillins Hives       Objective   Vitals: Temp (24hrs), Av 9 °F (36 6 °C), Min:96 9 °F (36 1 °C), Max:99 2 °F (37 3 °C)  Current: Temperature: (!) 96 9 °F (36 1 °C)  Patient Vitals for the past 24 hrs:   BP Temp Pulse Resp SpO2   22 0629 147/80 -- 96 20 96 %   22 0256 128/73 (!) 96 9 °F (36 1 °C) 97 18 90 %   222 -- -- -- -- 95 %   22 2132 123/73 99 2 °F (37 3 °C) 79 -- 93 %   22 1924 121/68 97 8 °F (36 6 °C) -- 18 --   22 1916 -- -- -- -- 97 %   22 1411 118/66 98 5 °F (36 9 °C) 102 20 99 %   22 1030 119/63 (!) 97 1 °F (36 2 °C) 96 -- 97 %   22 1027 119/63 -- 100 -- 94 %    Body mass index is 70 6 kg/m²    Orthostatic Blood Pressures    Flowsheet Row Most Recent Value   Blood Pressure 147/80 filed at 2022 5652   Patient Position - Orthostatic VS Lying filed at 2022 1200              Invasive Devices  Report    Peripherally Inserted Central Catheter Line  Duration           PICC Line 11/54/10 Right Basilic 10 days          Drain  Duration           External Urinary Catheter 2 days    NG/OG/Enteral Tube Nasogastric 18 Fr Right nare <1 day                Physical Exam:  Physical Exam        Lab Results:   Results from last 7 days   Lab Units 06/24/22  0538 06/23/22  0543 06/22/22  0010 06/21/22  0522 06/20/22  0459 06/19/22  0551 06/18/22  0424 06/17/22  2323 06/17/22  1307   WBC Thousand/uL 8 43 9 31 10 28* 7 47 6 26 8 91 13 86* 13 21*  --    HEMOGLOBIN g/dL 9 1* 8 8* 9 5* 8 5* 7 8* 8 1* 9 6* 9 4*  --    I STAT HEMOGLOBIN g/dl  --   --   --   --   --   --   --   --  10 2*   HEMATOCRIT % 29 0* 28 9* 31 1* 27 2* 25 3* 25 8* 30 8* 30 0*  --    HEMATOCRIT, ISTAT %  --   --   --   --   --   --   --   --  30*   PLATELETS Thousands/uL 270 239 210 170 141* 160 208 238  --    NEUTROS PCT %  --   --   --  70  --   --   --   --   --    MONOS PCT %  --   --   --  9  --   --   --   --   --    MONO PCT % 2*  --   --   --   --   --   --  6  --       Results from last 7 days   Lab Units 06/24/22  0538 06/23/22  0543 06/22/22  0623 06/21/22  0522 06/20/22  0459 06/19/22  0551 06/19/22  0032 06/18/22  1539 06/18/22  0612 06/18/22  0424 06/17/22  2323 06/17/22  1724 06/17/22  1307   SODIUM mmol/L 139 140 137 137 136 134*  --   --   --  134* 135* 135*  --    POTASSIUM mmol/L 4 4 3 9 3 7 3 5 3 3* 4 0  --   --   --  4 5 4 5 4 6  --    CHLORIDE mmol/L 99* 98* 98* 99* 99* 99*  --   --   --  102 102 102  --    CO2 mmol/L 35* 35* 36* 33* 32 31  --   --   --  29 29 31  --    CO2, I-STAT mmol/L  --   --   --   --   --   --   --   --   --   --   --   --  35*   BUN mg/dL 18 19 20 14 16 11  --   --   --  9 11 12  --    CREATININE mg/dL 0 47* 0 49* 0 53* 0 50* 0 54* 0 60  --   --   --  0 64 0 74 0 75  --    CALCIUM mg/dL 9 0 9 2 8 9 8 3 8 4 8 2*  --   --   --  8 4 8 3 8 4  --    ALK PHOS U/L  --   --   --   --  56 63  --   --   --  61  --   --   --    ALT U/L  --   --   --   --  13 17  --   --   --  16  --   --   --    AST U/L  --   --   --   --  13 18  --   --   --  24  --   --   --    GLUCOSE, ISTAT mg/dl  --   --   --   --   --   --   --   --   --   --   -- --  137   MAGNESIUM mg/dL  --  2 5 2 2 2 2 2 1 2 2  --   --   --  2 5  --  2 6  --    PHOSPHORUS mg/dL  --  3 6 4 6* 3 2 2 5* 2 6*  --   --   --  2 7  --  4 0  --    PTT seconds 47* 88* 64* 60* 79* 70* 73* 42* 38*  --  38*  --   --    EGFR ml/min/1 73sq m 128 126 122 125 121 116  --   --   --  113 106 106  --      Results from last 7 days   Lab Units 06/24/22  0538 06/23/22  0543 06/22/22  0623 06/21/22  0522 06/20/22  0459 06/19/22  0551 06/19/22  0032 06/18/22  1539 06/18/22  0612 06/17/22  2323   PTT seconds 47* 88* 64* 60* 79* 70* 73* 42* 38* 38*     Results from last 7 days   Lab Units 06/17/22  2323   LACTIC ACID mmol/L 2 0         No results found for: PHART, QSL2GRA, PO2ART, FOT6NQA, T4KENWUX, BEART, SOURCE  No components found for: HIV1X2  No results found for: HAV, HEPAIGM, HEPBIGM, HEPBCAB, HBEAG, HEPCAB  No results found for: SPEP, UPEP   Lab Results   Component Value Date    HGBA1C 5 6 06/07/2022    HGBA1C 5 4 03/21/2022    HGBA1C 5 5 12/22/2021     No results found for: CHOL   Lab Results   Component Value Date    HDL 44 08/16/2016      Lab Results   Component Value Date    LDLCALC 80 08/16/2016      Lab Results   Component Value Date    TRIG 124 06/22/2022    TRIG 556 (H) 06/19/2022    TRIG 172 (H) 06/13/2022     No components found for: PROCAL          Imaging: I have personally reviewed pertinent reports

## 2022-06-25 LAB
ANION GAP SERPL CALCULATED.3IONS-SCNC: 4 MMOL/L (ref 4–13)
APTT PPP: 53 SECONDS (ref 23–37)
APTT PPP: 61 SECONDS (ref 23–37)
APTT PPP: 95 SECONDS (ref 23–37)
BUN SERPL-MCNC: 17 MG/DL (ref 5–25)
CALCIUM SERPL-MCNC: 9 MG/DL (ref 8.3–10.1)
CHLORIDE SERPL-SCNC: 98 MMOL/L (ref 100–108)
CO2 SERPL-SCNC: 34 MMOL/L (ref 21–32)
CREAT SERPL-MCNC: 0.5 MG/DL (ref 0.6–1.3)
ERYTHROCYTE [DISTWIDTH] IN BLOOD BY AUTOMATED COUNT: 15.2 % (ref 11.6–15.1)
ERYTHROCYTE [DISTWIDTH] IN BLOOD BY AUTOMATED COUNT: 15.4 % (ref 11.6–15.1)
GFR SERPL CREATININE-BSD FRML MDRD: 125 ML/MIN/1.73SQ M
GLUCOSE SERPL-MCNC: 108 MG/DL (ref 65–140)
GLUCOSE SERPL-MCNC: 111 MG/DL (ref 65–140)
GLUCOSE SERPL-MCNC: 124 MG/DL (ref 65–140)
GLUCOSE SERPL-MCNC: 138 MG/DL (ref 65–140)
GLUCOSE SERPL-MCNC: 151 MG/DL (ref 65–140)
GLUCOSE SERPL-MCNC: 155 MG/DL (ref 65–140)
HCT VFR BLD AUTO: 28.7 % (ref 36.5–49.3)
HCT VFR BLD AUTO: 29.6 % (ref 36.5–49.3)
HGB BLD-MCNC: 8.5 G/DL (ref 12–17)
HGB BLD-MCNC: 8.9 G/DL (ref 12–17)
MAGNESIUM SERPL-MCNC: 2.4 MG/DL (ref 1.6–2.6)
MCH RBC QN AUTO: 29.3 PG (ref 26.8–34.3)
MCH RBC QN AUTO: 29.5 PG (ref 26.8–34.3)
MCHC RBC AUTO-ENTMCNC: 29.6 G/DL (ref 31.4–37.4)
MCHC RBC AUTO-ENTMCNC: 30.1 G/DL (ref 31.4–37.4)
MCV RBC AUTO: 100 FL (ref 82–98)
MCV RBC AUTO: 97 FL (ref 82–98)
PHOSPHATE SERPL-MCNC: 4.3 MG/DL (ref 2.7–4.5)
PLATELET # BLD AUTO: 260 THOUSANDS/UL (ref 149–390)
PLATELET # BLD AUTO: 271 THOUSANDS/UL (ref 149–390)
PMV BLD AUTO: 11.2 FL (ref 8.9–12.7)
PMV BLD AUTO: 11.6 FL (ref 8.9–12.7)
POTASSIUM SERPL-SCNC: 4.2 MMOL/L (ref 3.5–5.3)
RBC # BLD AUTO: 2.88 MILLION/UL (ref 3.88–5.62)
RBC # BLD AUTO: 3.04 MILLION/UL (ref 3.88–5.62)
SODIUM SERPL-SCNC: 136 MMOL/L (ref 136–145)
WBC # BLD AUTO: 7.96 THOUSAND/UL (ref 4.31–10.16)
WBC # BLD AUTO: 8.59 THOUSAND/UL (ref 4.31–10.16)

## 2022-06-25 PROCEDURE — 85027 COMPLETE CBC AUTOMATED: CPT | Performed by: SURGERY

## 2022-06-25 PROCEDURE — 99232 SBSQ HOSP IP/OBS MODERATE 35: CPT | Performed by: INTERNAL MEDICINE

## 2022-06-25 PROCEDURE — 85730 THROMBOPLASTIN TIME PARTIAL: CPT | Performed by: SURGERY

## 2022-06-25 PROCEDURE — 82948 REAGENT STRIP/BLOOD GLUCOSE: CPT

## 2022-06-25 PROCEDURE — NC001 PR NO CHARGE: Performed by: SURGERY

## 2022-06-25 PROCEDURE — 83735 ASSAY OF MAGNESIUM: CPT | Performed by: SURGERY

## 2022-06-25 PROCEDURE — 84100 ASSAY OF PHOSPHORUS: CPT | Performed by: SURGERY

## 2022-06-25 PROCEDURE — 94760 N-INVAS EAR/PLS OXIMETRY 1: CPT

## 2022-06-25 PROCEDURE — 94640 AIRWAY INHALATION TREATMENT: CPT

## 2022-06-25 PROCEDURE — 80048 BASIC METABOLIC PNL TOTAL CA: CPT | Performed by: SURGERY

## 2022-06-25 RX ORDER — SODIUM CHLORIDE, SODIUM GLUCONATE, SODIUM ACETATE, POTASSIUM CHLORIDE, MAGNESIUM CHLORIDE, SODIUM PHOSPHATE, DIBASIC, AND POTASSIUM PHOSPHATE .53; .5; .37; .037; .03; .012; .00082 G/100ML; G/100ML; G/100ML; G/100ML; G/100ML; G/100ML; G/100ML
500 INJECTION, SOLUTION INTRAVENOUS ONCE
Status: COMPLETED | OUTPATIENT
Start: 2022-06-25 | End: 2022-06-25

## 2022-06-25 RX ADMIN — IPRATROPIUM BROMIDE 0.5 MG: 0.5 SOLUTION RESPIRATORY (INHALATION) at 13:34

## 2022-06-25 RX ADMIN — TORSEMIDE 40 MG: 20 TABLET ORAL at 20:47

## 2022-06-25 RX ADMIN — GABAPENTIN 300 MG: 250 SOLUTION ORAL at 20:48

## 2022-06-25 RX ADMIN — IPRATROPIUM BROMIDE 0.5 MG: 0.5 SOLUTION RESPIRATORY (INHALATION) at 20:18

## 2022-06-25 RX ADMIN — OXYCODONE HYDROCHLORIDE 10 MG: 5 SOLUTION ORAL at 10:59

## 2022-06-25 RX ADMIN — ACETAMINOPHEN 650 MG: 650 SUSPENSION ORAL at 10:59

## 2022-06-25 RX ADMIN — SENNOSIDES AND DOCUSATE SODIUM 1 TABLET: 8.6; 5 TABLET ORAL at 16:48

## 2022-06-25 RX ADMIN — LEVALBUTEROL HYDROCHLORIDE 1.25 MG: 1.25 SOLUTION, CONCENTRATE RESPIRATORY (INHALATION) at 13:34

## 2022-06-25 RX ADMIN — OXYCODONE HYDROCHLORIDE 10 MG: 5 SOLUTION ORAL at 16:31

## 2022-06-25 RX ADMIN — HEPARIN SODIUM 27 UNITS/KG/HR: 10000 INJECTION, SOLUTION INTRAVENOUS at 06:17

## 2022-06-25 RX ADMIN — OXYCODONE HYDROCHLORIDE 10 MG: 5 SOLUTION ORAL at 20:48

## 2022-06-25 RX ADMIN — INSULIN LISPRO 1 UNITS: 100 INJECTION, SOLUTION INTRAVENOUS; SUBCUTANEOUS at 11:35

## 2022-06-25 RX ADMIN — QUETIAPINE FUMARATE 50 MG: 25 TABLET ORAL at 21:01

## 2022-06-25 RX ADMIN — GABAPENTIN 300 MG: 250 SOLUTION ORAL at 16:32

## 2022-06-25 RX ADMIN — Medication: at 20:49

## 2022-06-25 RX ADMIN — CEFTRIAXONE 2000 MG: 2 INJECTION, POWDER, FOR SOLUTION INTRAMUSCULAR; INTRAVENOUS at 16:31

## 2022-06-25 RX ADMIN — HYDROMORPHONE HYDROCHLORIDE 1 MG: 1 INJECTION, SOLUTION INTRAMUSCULAR; INTRAVENOUS; SUBCUTANEOUS at 10:02

## 2022-06-25 RX ADMIN — HEPARIN SODIUM 5000 UNITS: 1000 INJECTION INTRAVENOUS; SUBCUTANEOUS at 12:23

## 2022-06-25 RX ADMIN — BUDESONIDE AND FORMOTEROL FUMARATE DIHYDRATE 2 PUFF: 160; 4.5 AEROSOL RESPIRATORY (INHALATION) at 10:17

## 2022-06-25 RX ADMIN — METOROPROLOL TARTRATE 2.5 MG: 5 INJECTION, SOLUTION INTRAVENOUS at 02:08

## 2022-06-25 RX ADMIN — DIGOXIN 125 MCG: 0.25 INJECTION INTRAMUSCULAR; INTRAVENOUS at 10:26

## 2022-06-25 RX ADMIN — METOROPROLOL TARTRATE 2.5 MG: 5 INJECTION, SOLUTION INTRAVENOUS at 10:19

## 2022-06-25 RX ADMIN — INSULIN LISPRO 1 UNITS: 100 INJECTION, SOLUTION INTRAVENOUS; SUBCUTANEOUS at 18:11

## 2022-06-25 RX ADMIN — METOROPROLOL TARTRATE 2.5 MG: 5 INJECTION, SOLUTION INTRAVENOUS at 16:32

## 2022-06-25 RX ADMIN — LEVALBUTEROL HYDROCHLORIDE 1.25 MG: 1.25 SOLUTION, CONCENTRATE RESPIRATORY (INHALATION) at 20:18

## 2022-06-25 RX ADMIN — SODIUM CHLORIDE, SODIUM GLUCONATE, SODIUM ACETATE, POTASSIUM CHLORIDE, MAGNESIUM CHLORIDE, SODIUM PHOSPHATE, DIBASIC, AND POTASSIUM PHOSPHATE 500 ML: .53; .5; .37; .037; .03; .012; .00082 INJECTION, SOLUTION INTRAVENOUS at 19:12

## 2022-06-25 RX ADMIN — ACETAMINOPHEN 650 MG: 650 SUSPENSION ORAL at 16:32

## 2022-06-25 RX ADMIN — HYDROMORPHONE HYDROCHLORIDE 1 MG: 1 INJECTION, SOLUTION INTRAMUSCULAR; INTRAVENOUS; SUBCUTANEOUS at 19:41

## 2022-06-25 RX ADMIN — HEPARIN SODIUM 27 UNITS/KG/HR: 10000 INJECTION, SOLUTION INTRAVENOUS at 20:46

## 2022-06-25 RX ADMIN — BUDESONIDE AND FORMOTEROL FUMARATE DIHYDRATE 2 PUFF: 160; 4.5 AEROSOL RESPIRATORY (INHALATION) at 19:41

## 2022-06-25 RX ADMIN — HYDROMORPHONE HYDROCHLORIDE 1 MG: 1 INJECTION, SOLUTION INTRAMUSCULAR; INTRAVENOUS; SUBCUTANEOUS at 05:07

## 2022-06-25 RX ADMIN — LEVALBUTEROL HYDROCHLORIDE 1.25 MG: 1.25 SOLUTION, CONCENTRATE RESPIRATORY (INHALATION) at 07:46

## 2022-06-25 RX ADMIN — IPRATROPIUM BROMIDE 0.5 MG: 0.5 SOLUTION RESPIRATORY (INHALATION) at 07:47

## 2022-06-25 RX ADMIN — SPIRONOLACTONE 50 MG: 50 TABLET ORAL at 10:37

## 2022-06-25 RX ADMIN — HEPARIN SODIUM 29 UNITS/KG/HR: 10000 INJECTION, SOLUTION INTRAVENOUS at 14:04

## 2022-06-25 RX ADMIN — HYDROMORPHONE HYDROCHLORIDE 1 MG: 1 INJECTION, SOLUTION INTRAMUSCULAR; INTRAVENOUS; SUBCUTANEOUS at 01:12

## 2022-06-25 RX ADMIN — METHOCARBAMOL TABLETS 500 MG: 500 TABLET, COATED ORAL at 20:48

## 2022-06-25 RX ADMIN — METOROPROLOL TARTRATE 2.5 MG: 5 INJECTION, SOLUTION INTRAVENOUS at 21:01

## 2022-06-25 NOTE — PROGRESS NOTES
Progress Note - General Surgery   Marialuisa Bobo 47 y o  male MRN: [id number]  Unit/Bed#: [facility] 607-01 Encounter: [id number]    Assessment:  48yoM p/w SBO 2/2 chronic incarcerated ventral hernia now s/p   6/14  ex lap, HARVEY 6/14, also s/p abdominal washout, repair of serosal tears  6/15 washout,abhera, VAC DPR  6/17 bridging vicryl mesh placement, wound vac placement    Intermittently tachycardic 100's yesterday, 2L NC  UOP 3765cc  NG 2835cc bilious  VAC 285cc serous    WBC pending (from 8 59)  Hb pending (from 8 9)  Cr pending (from 0 50)    Plan:  NPO/NGT  Continue TPN  VAC change MWF  Appreciate cardiology recommendations, IV digoxin and metoprolol while NPO, continue heparin drip  PRN analgesia    Subjective/Objective     Subjective: No acute events overnight, remains NPO with NGT, passing flatus but denies BM, pain controlled    Objective:    Blood pressure 134/86, pulse 98, temperature 99 1 °F (37 3 °C), resp  rate 22, height 5' 11" (1 803 m), weight (!) 230 kg (506 lb 2 8 oz), SpO2 93 %  ,Body mass index is 70 6 kg/m²        Intake/Output Summary (Last 24 hours) at 6/26/2022 0150  Last data filed at 6/26/2022 0601  Gross per 24 hour   Intake 4674 29 ml   Output 6885 ml   Net -2210 71 ml       Invasive Devices  Report    Peripherally Inserted Central Catheter Line  Duration           PICC Line 72/72/95 Right Basilic 12 days          Drain  Duration           External Urinary Catheter 4 days    NG/OG/Enteral Tube Nasogastric 18 Fr Right nare 2 days                Physical Exam:  Gen:    NAD  CV:      warm, well-perfused  Lungs: No respiratory distress on 2L NC  Abd:     soft, NT/ND, VAC intact, NG bilious  Ext:      no CCE  Neuro: A&Ox3

## 2022-06-25 NOTE — PLAN OF CARE
Problem: Prexisting or High Potential for Compromised Skin Integrity  Goal: Skin integrity is maintained or improved  Description: INTERVENTIONS:  - Identify patients at risk for skin breakdown  - Assess and monitor skin integrity  - Assess and monitor nutrition and hydration status  - Monitor labs   - Assess for incontinence   - Turn and reposition patient  - Assist with mobility/ambulation  - Relieve pressure over bony prominences  - Avoid friction and shearing  - Provide appropriate hygiene as needed including keeping skin clean and dry  - Evaluate need for skin moisturizer/barrier cream  - Collaborate with interdisciplinary team   - Patient/family teaching  - Consider wound care consult   6/25/2022 1304 by Jarett Moran RN  Outcome: Progressing  6/25/2022 1300 by Jarett Moran RN  Outcome: Progressing     Problem: Potential for Falls  Goal: Patient will remain free of falls  Description: INTERVENTIONS:  - Educate patient/family on patient safety including physical limitations  - Instruct patient to call for assistance with activity   - Consult OT/PT to assist with strengthening/mobility   - Keep Call bell within reach  - Keep bed low and locked with side rails adjusted as appropriate  - Keep care items and personal belongings within reach  - Initiate and maintain comfort rounds  - Make Fall Risk Sign visible to staff  - Offer Toileting every 1 Hours, in advance of need  - Initiate/Maintain alarm  - Obtain necessary fall risk management equipment  - Apply yellow socks and bracelet for high fall risk patients  - Consider moving patient to room near nurses station  6/25/2022 1304 by Jarett Moran RN  Outcome: Progressing  6/25/2022 1300 by Jarett Moran RN  Outcome: Progressing     Problem: MOBILITY - ADULT  Goal: Maintain or return to baseline ADL function  Description: INTERVENTIONS:  -  Assess patient's ability to carry out ADLs; assess patient's baseline for ADL function and identify physical deficits which impact ability to perform ADLs (bathing, care of mouth/teeth, toileting, grooming, dressing, etc )  - Assess/evaluate cause of self-care deficits   - Assess range of motion  - Assess patient's mobility; develop plan if impaired  - Assess patient's need for assistive devices and provide as appropriate  - Encourage maximum independence but intervene and supervise when necessary  - Involve family in performance of ADLs  - Assess for home care needs following discharge   - Consider OT consult to assist with ADL evaluation and planning for discharge  - Provide patient education as appropriate  6/25/2022 1304 by Josh Duval RN  Outcome: Progressing  6/25/2022 1300 by Josh Duval RN  Outcome: Progressing  Goal: Maintains/Returns to pre admission functional level  Description: INTERVENTIONS:  - Perform BMAT or MOVE assessment daily    - Set and communicate daily mobility goal to care team and patient/family/caregiver  - Collaborate with rehabilitation services on mobility goals if consulted  - Perform Range of Motion 3 times a day  - Reposition patient every 2 hours  - Dangle patient 3 times a day  - Stand patient 3 times a day  - Ambulate patient 3 times a day  - Out of bed to chair 3 times a day   - Out of bed for meals 3 times a day  - Out of bed for toileting  - Record patient progress and toleration of activity level   6/25/2022 1304 by Josh Duval RN  Outcome: Progressing  6/25/2022 1300 by Josh Duval RN  Outcome: Progressing     Problem: Nutrition/Hydration-ADULT  Goal: Nutrient/Hydration intake appropriate for improving, restoring or maintaining nutritional needs  Description: Monitor and assess patient's nutrition/hydration status for malnutrition  Collaborate with interdisciplinary team and initiate plan and interventions as ordered  Monitor patient's weight and dietary intake as ordered or per policy  Utilize nutrition screening tool and intervene as necessary   Determine patient's food preferences and provide high-protein, high-caloric foods as appropriate       INTERVENTIONS:  - Monitor oral intake, urinary output, labs, and treatment plans  - Assess nutrition and hydration status and recommend course of action  - Evaluate amount of meals eaten  - Assist patient with eating if necessary   - Allow adequate time for meals  - Recommend/ encourage appropriate diets, oral nutritional supplements, and vitamin/mineral supplements  - Order, calculate, and assess calorie counts as needed  - Recommend, monitor, and adjust tube feedings and TPN/PPN based on assessed needs  - Assess need for intravenous fluids  - Provide specific nutrition/hydration education as appropriate  - Include patient/family/caregiver in decisions related to nutrition  6/25/2022 1304 by Paty Beltrán RN  Outcome: Progressing  6/25/2022 1300 by Paty Beltrán RN  Outcome: Progressing     Problem: PAIN - ADULT  Goal: Verbalizes/displays adequate comfort level or baseline comfort level  Description: Interventions:  - Encourage patient to monitor pain and request assistance  - Assess pain using appropriate pain scale  - Administer analgesics based on type and severity of pain and evaluate response  - Implement non-pharmacological measures as appropriate and evaluate response  - Consider cultural and social influences on pain and pain management  - Notify physician/advanced practitioner if interventions unsuccessful or patient reports new pain  Outcome: Progressing     Problem: INFECTION - ADULT  Goal: Absence or prevention of progression during hospitalization  Description: INTERVENTIONS:  - Assess and monitor for signs and symptoms of infection  - Monitor lab/diagnostic results  - Monitor all insertion sites, i e  indwelling lines, tubes, and drains  - Monitor endotracheal if appropriate and nasal secretions for changes in amount and color  - [place] appropriate cooling/warming therapies per order  - Administer medications as ordered  - Instruct and encourage patient and family to use good hand hygiene technique  - Identify and instruct in appropriate isolation precautions for identified infection/condition  Outcome: Progressing     Problem: DISCHARGE PLANNING  Goal: Discharge to home or other facility with appropriate resources  Description: INTERVENTIONS:  - Identify barriers to discharge w/patient and caregiver  - Arrange for needed discharge resources and transportation as appropriate  - Identify discharge learning needs (meds, wound care, etc )  - Arrange for interpretive services to assist at discharge as needed  - Refer to Case Management Department for coordinating discharge planning if the patient needs post-hospital services based on physician/advanced practitioner order or complex needs related to functional status, cognitive ability, or social support system  Outcome: Progressing     Problem: Knowledge Deficit  Goal: Patient/family/caregiver demonstrates understanding of disease process, treatment plan, medications, and discharge instructions  Description: Complete learning assessment and assess knowledge base    Interventions:  - Provide teaching at level of understanding  - Provide teaching via preferred learning methods  Outcome: Progressing

## 2022-06-25 NOTE — PROGRESS NOTES
Cardiology Progress Note - Jayro Pruitt 47 y o  male MRN: [id number]    Unit/Bed#: [facility] 607-01 Encounter: [id number]      Assessment/Recommendations:  1  Atrial fibrillation:  Difficult to control heart rates in the setting of small-bowel obstruction  Continued on IV digoxin and IV metoprolol while p o  Meds are held for the time being  Continued on IV heparin for anticoagulation  Will need to be transitioned back to oral medications, once tolerating p o  And no further surgical intervention planned  Continue to follow on telemetry, relatively good rate control  2  Chronic diastolic heart failure:  No acute exacerbation at the current time  Started on p o  Diuretic per primary team   Renal function remain stable  3  Acute respiratory failure:  Likely secondary to obesity hypoventilation syndrome  No evidence of heart failure at the current time  4  Small bowel obstruction:  As per primary team     Subjective:   Patient seen and examined  No significant events overnight   ; pertinent negatives - chest pain, chest pressure/discomfort, dyspnea, irregular heart beat, lower extremity edema and palpitations  Objective:     Vitals: Blood pressure (!) 139/109, pulse 103, temperature 99 3 °F (37 4 °C), resp  rate 20, height 5' 11" (1 803 m), weight (!) 230 kg (506 lb 2 8 oz), SpO2 95 %  , Body mass index is 70 6 kg/m² ,   Orthostatic Blood Pressures    Flowsheet Row Most Recent Value   Blood Pressure 139/109 filed at 06/25/2022 0754   Patient Position - Orthostatic VS Lying filed at 06/24/2022 1953            Intake/Output Summary (Last 24 hours) at 6/25/2022 0831  Last data filed at 6/25/2022 0600  Gross per 24 hour   Intake 0 ml   Output 3795 ml   Net -3795 ml       TELE:  AFib with adequate rate control    Physical Exam:    GEN: Jayro Pruitt appears well, alert and oriented x 3, pleasant and cooperative   HEENT: pupils equal, round, and reactive to light; extraocular muscles intact  NECK: supple, no carotid bruits   HEART:  Irregular rhythm, normal S1 and S2, no murmurs, clicks, gallops or rubs   LUNGS: clear to auscultation bilaterally; no wheezes, rales, or rhonchi   ABDOMEN: normal bowel sounds, soft, no tenderness, no distention  EXTREMITIES: peripheral pulses normal; no clubbing, cyanosis, or edema  NEURO: no focal findings   SKIN: normal without suspicious lesions on exposed skin    Medications:      Current Facility-Administered Medications:     acetaminophen (TYLENOL) oral suspension 650 mg, 650 mg, Oral, Q6H, Hellengarcia Cordoba PA-C, 650 mg at 06/21/22 1748    Adult 3-in-1 TPN (custom base / custom electrolytes), , Intravenous, Continuous TPN, Joanie Banerjee MD, Last Rate: 75 5 mL/hr at 06/24/22 2055, New Bag at 06/24/22 2055    Adult 3-in-1 TPN (custom base / custom electrolytes), , Intravenous, Continuous TPN, Hansel Keene MD    bisacodyl (DULCOLAX) rectal suppository 10 mg, 10 mg, Rectal, Daily, Lisa Jeffery Cordoba PA-C, 10 mg at 06/24/22 0835    budesonide-formoterol (SYMBICORT) 160-4 5 mcg/act inhaler 2 puff, 2 puff, Inhalation, BID, Lisaalize Cordoba PA-C, 2 puff at 06/24/22 2139    cefTRIAXone (ROCEPHIN) 2,000 mg in dextrose 5 % 50 mL IVPB, 2,000 mg, Intravenous, Q24H, Hellen Cordoba PA-C, Last Rate: 100 mL/hr at 06/24/22 1649, 2,000 mg at 06/24/22 1649    digoxin (LANOXIN) injection 125 mcg, 125 mcg, Intravenous, Daily, Raquel Muhammad DO, 125 mcg at 06/24/22 0830    gabapentin (NEURONTIN) oral solution 300 mg, 300 mg, Oral, TID, Lisaalize Deyon, PA-C, 300 mg at 06/24/22 2053    heparin (porcine) 25,000 units in 0 45% NaCl 250 mL infusion (premix), 3-30 Units/kg/hr (Order-Specific), Intravenous, Titrated, Lisa Jeffery Cordoba PA-C, Last Rate: 33 8 mL/hr at 06/25/22 0617, 27 Units/kg/hr at 06/25/22 0617    heparin (porcine) injection 10,000 Units, 10,000 Units, Intravenous, Q1H PRN, Alma Momin PA-C, 10,000 Units at 06/18/22 1803    heparin (porcine) injection 5,000 Units, 5,000 Units, Intravenous, Q1H PRN, Rayrehan Cooka, PA-C, 5,000 Units at 06/24/22 2207    HYDROmorphone (DILAUDID) injection 1 mg, 1 mg, Intravenous, Q4H PRN, Sylvester Primas Rasmuson, PA-C, 1 mg at 06/25/22 0507    insulin lispro (HumaLOG) 100 units/mL subcutaneous injection 1-5 Units, 1-5 Units, Subcutaneous, Q6H [address], 1 Units at 06/24/22 1150 **AND** Fingerstick Glucose (POCT), , , Q6H, Hellen MANPREET Rasmuson, PA-C    ipratropium (ATROVENT) 0 02 % inhalation solution 0 5 mg, 0 5 mg, Nebulization, TID, Bow Primas Rasmuson, PA-C, 0 5 mg at 06/25/22 0747    levalbuterol (XOPENEX) inhalation solution 1 25 mg, 1 25 mg, Nebulization, TID, Bow Primas Rasmuson, PA-C, 1 25 mg at 06/25/22 0746    levothyroxine tablet 25 mcg, 25 mcg, Oral, Early Morning, Bow Primas Rasmuson, PA-C, 25 mcg at 06/21/22 1239    methocarbamol (ROBAXIN) tablet 500 mg, 500 mg, Oral, Q6H PRN, Sylvester Primas Rasmuson, PA-C, 500 mg at 06/24/22 2053    metoclopramide (REGLAN) injection 10 mg, 10 mg, Intravenous, Q6H PRN, Chandrakant Charles MD    metoprolol [facility]) injection 2 5 mg, 2 5 mg, Intravenous, Q6H, Jeff Jensen DO, 2 5 mg at 06/25/22 0208    omeprazole (PRILOSEC) suspension 2 mg/mL, 20 mg, Oral, Early Morning, Hellen R Rasmuson, PA-C, 20 mg at 06/21/22 1311    ondansetron (ZOFRAN) injection 4 mg, 4 mg, Intravenous, Q4H PRN, Regine Townsend MD    oxyCODONE (ROXICODONE) oral solution 10 mg, 10 mg, Oral, Q4H PRN, Sylvester Primas Rasmuson, PA-C, 10 mg at 06/21/22 1748    oxyCODONE (ROXICODONE) oral solution 5 mg, 5 mg, Oral, Q4H PRN, Bow Primas Rasmuson, PA-C, 5 mg at 06/23/22 2133    polyethylene glycol (MIRALAX) packet 17 g, 17 g, Oral, Daily, Sylvester Primas Rasmuson, PA-C, 17 g at 06/21/22 1253    QUEtiapine (SEROquel) tablet 50 mg, 50 mg, Oral, HS, Hellen R Rasmuson, PA-C, 50 mg at 06/24/22 2054    senna-docusate sodium (SENOKOT S) 8 6-50 mg per tablet 1 tablet, 1 tablet, Oral, BID, Sylvester Primas Rasmuson, PA-C, 1 tablet at 06/21/22 1748    spironolactone (ALDACTONE) tablet 50 mg, 50 mg, Per NG Tube, Daily, Rian Holt Rasmuson, PA-C, 50 mg at 06/21/22 0946    torsemide (DEMADEX) tablet 40 mg, 40 mg, Oral, BID, [place] Rasmuson, PA-C, 40 mg at 06/24/22 2053     Labs & Results:        Results from last 7 days   Lab Units 06/25/22  0525 06/25/22  0000 06/24/22  0538   WBC Thousand/uL 8 59 7 96 8 43   HEMOGLOBIN g/dL 8 9* 8 5* 9 1*   HEMATOCRIT % 29 6* 28 7* 29 0*   PLATELETS Thousands/uL 271 260 270     Results from last 7 days   Lab Units 06/22/22  0623 06/19/22  0923   TRIGLYCERIDES mg/dL 124 556*     Results from last 7 days   Lab Units 06/25/22  0525 06/24/22  0538 06/23/22  0543 06/21/22  0522 06/20/22  0459 06/19/22  0551   POTASSIUM mmol/L 4 2 4 4 3 9   < > 3 3* 4 0   CHLORIDE mmol/L 98* 99* 98*   < > 99* 99*   CO2 mmol/L 34* 35* 35*   < > 32 31   BUN mg/dL 17 18 19   < > 16 11   CREATININE mg/dL 0 50* 0 47* 0 49*   < > 0 54* 0 60   CALCIUM mg/dL 9 0 9 0 9 2   < > 8 4 8 2*   ALK PHOS U/L  --   --   --   --  56 63   ALT U/L  --   --   --   --  13 17   AST U/L  --   --   --   --  13 18    < > = values in this interval not displayed       Results from last 7 days   Lab Units 06/25/22  0525 06/24/22  2115 06/24/22  1334   PTT seconds 61* 59* 139*     Results from last 7 days   Lab Units 06/25/22  0525 06/23/22  0543 06/22/22  0623   MAGNESIUM mg/dL 2 4 2 5 2 2         EKG personally reviewed by Denton Lesches, MD

## 2022-06-26 ENCOUNTER — APPOINTMENT (INPATIENT)
Dept: RADIOLOGY | Facility: HOSPITAL | Age: 52
DRG: 230 | End: 2022-06-26
Payer: COMMERCIAL

## 2022-06-26 LAB
ANION GAP SERPL CALCULATED.3IONS-SCNC: 4 MMOL/L (ref 4–13)
APTT PPP: 75 SECONDS (ref 23–37)
APTT PPP: 77 SECONDS (ref 23–37)
BUN SERPL-MCNC: 17 MG/DL (ref 5–25)
CALCIUM SERPL-MCNC: 8.9 MG/DL (ref 8.3–10.1)
CHLORIDE SERPL-SCNC: 93 MMOL/L (ref 100–108)
CO2 SERPL-SCNC: 38 MMOL/L (ref 21–32)
CREAT SERPL-MCNC: 0.64 MG/DL (ref 0.6–1.3)
ERYTHROCYTE [DISTWIDTH] IN BLOOD BY AUTOMATED COUNT: 15 % (ref 11.6–15.1)
GFR SERPL CREATININE-BSD FRML MDRD: 113 ML/MIN/1.73SQ M
GLUCOSE SERPL-MCNC: 105 MG/DL (ref 65–140)
GLUCOSE SERPL-MCNC: 111 MG/DL (ref 65–140)
GLUCOSE SERPL-MCNC: 128 MG/DL (ref 65–140)
GLUCOSE SERPL-MCNC: 135 MG/DL (ref 65–140)
GLUCOSE SERPL-MCNC: 150 MG/DL (ref 65–140)
HCT VFR BLD AUTO: 29 % (ref 36.5–49.3)
HGB BLD-MCNC: 8.6 G/DL (ref 12–17)
MAGNESIUM SERPL-MCNC: 2.2 MG/DL (ref 1.6–2.6)
MCH RBC QN AUTO: 28.4 PG (ref 26.8–34.3)
MCHC RBC AUTO-ENTMCNC: 29.7 G/DL (ref 31.4–37.4)
MCV RBC AUTO: 96 FL (ref 82–98)
PHOSPHATE SERPL-MCNC: 5 MG/DL (ref 2.7–4.5)
PLATELET # BLD AUTO: 248 THOUSANDS/UL (ref 149–390)
PMV BLD AUTO: 11.5 FL (ref 8.9–12.7)
POTASSIUM SERPL-SCNC: 4.2 MMOL/L (ref 3.5–5.3)
RBC # BLD AUTO: 3.03 MILLION/UL (ref 3.88–5.62)
SODIUM SERPL-SCNC: 135 MMOL/L (ref 136–145)
WBC # BLD AUTO: 8.77 THOUSAND/UL (ref 4.31–10.16)

## 2022-06-26 PROCEDURE — 85730 THROMBOPLASTIN TIME PARTIAL: CPT | Performed by: SURGERY

## 2022-06-26 PROCEDURE — 84100 ASSAY OF PHOSPHORUS: CPT | Performed by: SURGERY

## 2022-06-26 PROCEDURE — 80048 BASIC METABOLIC PNL TOTAL CA: CPT | Performed by: SURGERY

## 2022-06-26 PROCEDURE — 94660 CPAP INITIATION&MGMT: CPT

## 2022-06-26 PROCEDURE — 94640 AIRWAY INHALATION TREATMENT: CPT

## 2022-06-26 PROCEDURE — 94760 N-INVAS EAR/PLS OXIMETRY 1: CPT

## 2022-06-26 PROCEDURE — 85027 COMPLETE CBC AUTOMATED: CPT | Performed by: SURGERY

## 2022-06-26 PROCEDURE — 82948 REAGENT STRIP/BLOOD GLUCOSE: CPT

## 2022-06-26 PROCEDURE — 74018 RADEX ABDOMEN 1 VIEW: CPT

## 2022-06-26 PROCEDURE — 99232 SBSQ HOSP IP/OBS MODERATE 35: CPT | Performed by: INTERNAL MEDICINE

## 2022-06-26 PROCEDURE — 99024 POSTOP FOLLOW-UP VISIT: CPT | Performed by: STUDENT IN AN ORGANIZED HEALTH CARE EDUCATION/TRAINING PROGRAM

## 2022-06-26 PROCEDURE — 83735 ASSAY OF MAGNESIUM: CPT | Performed by: SURGERY

## 2022-06-26 RX ORDER — BISACODYL 10 MG
10 SUPPOSITORY, RECTAL RECTAL ONCE
Status: COMPLETED | OUTPATIENT
Start: 2022-06-26 | End: 2022-06-26

## 2022-06-26 RX ADMIN — LEVALBUTEROL HYDROCHLORIDE 1.25 MG: 1.25 SOLUTION, CONCENTRATE RESPIRATORY (INHALATION) at 13:22

## 2022-06-26 RX ADMIN — HEPARIN SODIUM 27 UNITS/KG/HR: 10000 INJECTION, SOLUTION INTRAVENOUS at 04:42

## 2022-06-26 RX ADMIN — HEPARIN SODIUM 27 UNITS/KG/HR: 10000 INJECTION, SOLUTION INTRAVENOUS at 12:39

## 2022-06-26 RX ADMIN — ACETAMINOPHEN 650 MG: 650 SUSPENSION ORAL at 15:24

## 2022-06-26 RX ADMIN — GABAPENTIN 300 MG: 250 SOLUTION ORAL at 15:25

## 2022-06-26 RX ADMIN — BUDESONIDE AND FORMOTEROL FUMARATE DIHYDRATE 2 PUFF: 160; 4.5 AEROSOL RESPIRATORY (INHALATION) at 20:02

## 2022-06-26 RX ADMIN — METOROPROLOL TARTRATE 2.5 MG: 5 INJECTION, SOLUTION INTRAVENOUS at 21:01

## 2022-06-26 RX ADMIN — METOROPROLOL TARTRATE 2.5 MG: 5 INJECTION, SOLUTION INTRAVENOUS at 04:45

## 2022-06-26 RX ADMIN — Medication: at 21:01

## 2022-06-26 RX ADMIN — METOROPROLOL TARTRATE 2.5 MG: 5 INJECTION, SOLUTION INTRAVENOUS at 10:59

## 2022-06-26 RX ADMIN — LEVALBUTEROL HYDROCHLORIDE 1.25 MG: 1.25 SOLUTION, CONCENTRATE RESPIRATORY (INHALATION) at 07:22

## 2022-06-26 RX ADMIN — METHOCARBAMOL TABLETS 500 MG: 500 TABLET, COATED ORAL at 04:57

## 2022-06-26 RX ADMIN — INSULIN LISPRO 1 UNITS: 100 INJECTION, SOLUTION INTRAVENOUS; SUBCUTANEOUS at 17:02

## 2022-06-26 RX ADMIN — BISACODYL 10 MG: 10 SUPPOSITORY RECTAL at 09:35

## 2022-06-26 RX ADMIN — POLYETHYLENE GLYCOL 3350 17 G: 17 POWDER, FOR SOLUTION ORAL at 09:35

## 2022-06-26 RX ADMIN — OXYCODONE HYDROCHLORIDE 10 MG: 5 SOLUTION ORAL at 01:08

## 2022-06-26 RX ADMIN — GABAPENTIN 300 MG: 250 SOLUTION ORAL at 09:43

## 2022-06-26 RX ADMIN — QUETIAPINE FUMARATE 50 MG: 25 TABLET ORAL at 21:00

## 2022-06-26 RX ADMIN — IPRATROPIUM BROMIDE 0.5 MG: 0.5 SOLUTION RESPIRATORY (INHALATION) at 13:22

## 2022-06-26 RX ADMIN — ACETAMINOPHEN 650 MG: 650 SUSPENSION ORAL at 20:59

## 2022-06-26 RX ADMIN — Medication 20 MG: at 05:05

## 2022-06-26 RX ADMIN — METOROPROLOL TARTRATE 2.5 MG: 5 INJECTION, SOLUTION INTRAVENOUS at 16:51

## 2022-06-26 RX ADMIN — HEPARIN SODIUM 27 UNITS/KG/HR: 10000 INJECTION, SOLUTION INTRAVENOUS at 20:15

## 2022-06-26 RX ADMIN — SENNOSIDES AND DOCUSATE SODIUM 1 TABLET: 8.6; 5 TABLET ORAL at 20:59

## 2022-06-26 RX ADMIN — BUDESONIDE AND FORMOTEROL FUMARATE DIHYDRATE 2 PUFF: 160; 4.5 AEROSOL RESPIRATORY (INHALATION) at 09:40

## 2022-06-26 RX ADMIN — GABAPENTIN 300 MG: 250 SOLUTION ORAL at 21:00

## 2022-06-26 RX ADMIN — LEVALBUTEROL HYDROCHLORIDE 1.25 MG: 1.25 SOLUTION, CONCENTRATE RESPIRATORY (INHALATION) at 20:34

## 2022-06-26 RX ADMIN — IPRATROPIUM BROMIDE 0.5 MG: 0.5 SOLUTION RESPIRATORY (INHALATION) at 20:34

## 2022-06-26 RX ADMIN — LEVOTHYROXINE SODIUM 25 MCG: 25 TABLET ORAL at 05:05

## 2022-06-26 RX ADMIN — OXYCODONE HYDROCHLORIDE 10 MG: 5 SOLUTION ORAL at 15:25

## 2022-06-26 RX ADMIN — TORSEMIDE 40 MG: 20 TABLET ORAL at 20:59

## 2022-06-26 RX ADMIN — ACETAMINOPHEN 650 MG: 650 SUSPENSION ORAL at 04:45

## 2022-06-26 RX ADMIN — DIGOXIN 125 MCG: 0.25 INJECTION INTRAMUSCULAR; INTRAVENOUS at 09:38

## 2022-06-26 RX ADMIN — OXYCODONE HYDROCHLORIDE 10 MG: 5 SOLUTION ORAL at 20:02

## 2022-06-26 RX ADMIN — OXYCODONE HYDROCHLORIDE 10 MG: 5 SOLUTION ORAL at 04:57

## 2022-06-26 RX ADMIN — IPRATROPIUM BROMIDE 0.5 MG: 0.5 SOLUTION RESPIRATORY (INHALATION) at 07:22

## 2022-06-26 RX ADMIN — SENNOSIDES AND DOCUSATE SODIUM 1 TABLET: 8.6; 5 TABLET ORAL at 09:36

## 2022-06-26 RX ADMIN — HYDROMORPHONE HYDROCHLORIDE 1 MG: 1 INJECTION, SOLUTION INTRAMUSCULAR; INTRAVENOUS; SUBCUTANEOUS at 06:26

## 2022-06-26 NOTE — PROGRESS NOTES
Cardiology Progress Note - Efren Easley 47 y o  male MRN: [id number]    Unit/Bed#: [facility] 607-01 Encounter: [id number]      Assessment/Recommendations:  1  Atrial fibrillation:  Difficult to control heart rates in the setting of small-bowel obstruction  Continued on IV digoxin and IV metoprolol while p o  Meds are held for the time being  Continued on IV heparin for anticoagulation  Will need to be transitioned back to oral medications, once tolerating p o  And no further surgical intervention planned  Continue to follow on telemetry, relatively good rate control, no changes for today  2  Chronic diastolic heart failure:  No acute exacerbation at the current time  Started on p o  Diuretic per primary team with significant diuretic response with iatrogenic volume overload  Renal function remains stable  3  Acute respiratory failure:  Likely secondary to obesity hypoventilation syndrome  No evidence of heart failure at the current time  4  Small bowel obstruction:  As per primary team     Subjective:   Patient seen and examined  No significant events overnight   ; pertinent negatives - chest pain, chest pressure/discomfort, dyspnea, irregular heart beat, lower extremity edema and palpitations  Objective:     Vitals: Blood pressure 110/64, pulse 99, temperature 99 2 °F (37 3 °C), resp  rate 20, height 5' 11" (1 803 m), weight (!) 230 kg (506 lb 2 8 oz), SpO2 94 %  , Body mass index is 70 6 kg/m² ,   Orthostatic Blood Pressures    Flowsheet Row Most Recent Value   Blood Pressure 110/64 filed at 06/26/2022 0756   Patient Position - Orthostatic VS Lying filed at 06/24/2022 1953            Intake/Output Summary (Last 24 hours) at 6/26/2022 0825  Last data filed at 6/26/2022 0601  Gross per 24 hour   Intake 4674 29 ml   Output 6885 ml   Net -2210 71 ml       TELE:  AFib with adequate rate control    Physical Exam:    GEN: [place] appears well, alert and oriented x 3, pleasant and cooperative   HEENT: pupils equal, round, and reactive to light; extraocular muscles intact  NECK: supple, no carotid bruits   HEART: irregular rhythm, normal S1 and S2, no murmurs, clicks, gallops or rubs   LUNGS: clear to auscultation bilaterally; no wheezes, rales, or rhonchi   ABDOMEN: normal bowel sounds, soft, no tenderness, no distention  EXTREMITIES: peripheral pulses normal; no clubbing, cyanosis, or edema  NEURO: no focal findings   SKIN: normal without suspicious lesions on exposed skin      Medications:      Current Facility-Administered Medications:     acetaminophen (TYLENOL) oral suspension 650 mg, 650 mg, Oral, Q6H, Hellen MANPREET Cordoba PA-C, 650 mg at 06/26/22 0445    Adult 3-in-1 TPN (custom base / custom electrolytes), , Intravenous, Continuous TPN, Tommy Sotelo MD, Last Rate: 75 5 mL/hr at 06/25/22 2049, New Bag at 06/25/22 2049    bisacodyl (DULCOLAX) rectal suppository 10 mg, 10 mg, Rectal, Daily, ALESSANDRO An-C, 10 mg at 06/24/22 2325    budesonide-formoterol (SYMBICORT) 160-4 5 mcg/act inhaler 2 puff, 2 puff, Inhalation, BID, ALESSANDRO An-C, 2 puff at 06/25/22 1941    digoxin (LANOXIN) injection 125 mcg, 125 mcg, Intravenous, Daily, Governor Johnson, DO, 125 mcg at 06/25/22 1026    gabapentin (NEURONTIN) oral solution 300 mg, 300 mg, Oral, TID, Quinn Cordoba PA-C, 300 mg at 06/25/22 2048    heparin (porcine) 25,000 units in 0 45% NaCl 250 mL infusion (premix), 3-30 Units/kg/hr (Order-Specific), Intravenous, Titrated, ALESSANDRO An-C, Last Rate: 33 8 mL/hr at 06/26/22 0442, 27 Units/kg/hr at 06/26/22 0442    heparin (porcine) injection 10,000 Units, 10,000 Units, Intravenous, Q1H PRN, ALESSANDRO An-C, 10,000 Units at 06/18/22 1803    heparin (porcine) injection 5,000 Units, 5,000 Units, Intravenous, Q1H PRN, Quinn Cordoba PA-C, 5,000 Units at 06/25/22 1223    HYDROmorphone (DILAUDID) injection 1 mg, 1 mg, Intravenous, Q4H PRN, Quinn Cordoba PA-C, 1 mg at 06/26/22 0626    insulin lispro (HumaLOG) 100 units/mL subcutaneous injection 1-5 Units, 1-5 Units, Subcutaneous, Q6H [address], 1 Units at 06/25/22 1811 **AND** Fingerstick Glucose (POCT), , , Q6H, Hellen Cordoba, PA-C    ipratropium (ATROVENT) 0 02 % inhalation solution 0 5 mg, 0 5 mg, Nebulization, TID, Merary Deyon, PA-C, 0 5 mg at 06/26/22 1776    levalbuterol (XOPENEX) inhalation solution 1 25 mg, 1 25 mg, Nebulization, TID, Merary Cordoba, PA-C, 1 25 mg at 06/26/22 1118    levothyroxine tablet 25 mcg, 25 mcg, Oral, Early Morning, Merary Cordoba, PA-C, 25 mcg at 06/26/22 0505    methocarbamol (ROBAXIN) tablet 500 mg, 500 mg, Oral, Q6H PRN, Merary Cordoba, PA-C, 500 mg at 06/26/22 0457    metoclopramide (REGLAN) injection 10 mg, 10 mg, Intravenous, Q6H PRN, Indigo Jarquin MD    metoprolol [facility]) injection 2 5 mg, 2 5 mg, Intravenous, Q6H, Donita Byrne DO, 2 5 mg at 06/26/22 0445    omeprazole (PRILOSEC) suspension 2 mg/mL, 20 mg, Oral, Early Morning, Hellen Cordoba, PA-C, 20 mg at 06/26/22 0505    ondansetron (ZOFRAN) injection 4 mg, 4 mg, Intravenous, Q4H PRN, Mykel Botello MD    oxyCODONE (ROXICODONE) oral solution 10 mg, 10 mg, Oral, Q4H PRN, Merary Cordoba, PA-C, 10 mg at 06/26/22 0457    oxyCODONE (ROXICODONE) oral solution 5 mg, 5 mg, Oral, Q4H PRN, Merary Cordoba, PA-C, 5 mg at 06/23/22 2133    polyethylene glycol (MIRALAX) packet 17 g, 17 g, Oral, Daily, Merary Joshua Raskeishaon, PA-C, 17 g at 06/21/22 1253    QUEtiapine (SEROquel) tablet 50 mg, 50 mg, Oral, HS, Hellen R Rasmuson, PA-C, 50 mg at 06/25/22 2101    senna-docusate sodium (SENOKOT S) 8 6-50 mg per tablet 1 tablet, 1 tablet, Oral, BID, Merary Greengigi Raskeishaon, PA-C, 1 tablet at 06/25/22 1648    spironolactone (ALDACTONE) tablet 50 mg, 50 mg, Per NG Tube, Daily, Hellen R Rasmuson, PA-C, 50 mg at 06/25/22 1037    torsemide (DEMADEX) tablet 40 mg, 40 mg, Oral, BID, David Villeda, PA-C, 40 mg at 06/25/22 2047     Labs & Results:        Results from last 7 days   Lab Units 06/26/22  0653 06/25/22  0525 06/25/22  0000   WBC Thousand/uL 8 77 8 59 7 96   HEMOGLOBIN g/dL 8 6* 8 9* 8 5*   HEMATOCRIT % 29 0* 29 6* 28 7*   PLATELETS Thousands/uL 248 271 260     Results from last 7 days   Lab Units 06/22/22  0623 06/19/22  0923   TRIGLYCERIDES mg/dL 124 556*     Results from last 7 days   Lab Units 06/26/22  0653 06/25/22  0525 06/24/22  0538 06/21/22  0522 06/20/22  0459   POTASSIUM mmol/L 4 2 4 2 4 4   < > 3 3*   CHLORIDE mmol/L 93* 98* 99*   < > 99*   CO2 mmol/L 38* 34* 35*   < > 32   BUN mg/dL 17 17 18   < > 16   CREATININE mg/dL 0 64 0 50* 0 47*   < > 0 54*   CALCIUM mg/dL 8 9 9 0 9 0   < > 8 4   ALK PHOS U/L  --   --   --   --  56   ALT U/L  --   --   --   --  13   AST U/L  --   --   --   --  13    < > = values in this interval not displayed       Results from last 7 days   Lab Units 06/26/22  0653 06/26/22  0106 06/25/22  1822   PTT seconds 75* 77* 95*     Results from last 7 days   Lab Units 06/26/22  0653 06/25/22  0525 06/23/22  0543   MAGNESIUM mg/dL 2 2 2 4 2 5         EKG personally reviewed by Gabby Boyd MD

## 2022-06-26 NOTE — PLAN OF CARE
Problem: Prexisting or High Potential for Compromised Skin Integrity  Goal: Skin integrity is maintained or improved  Description: INTERVENTIONS:  - Identify patients at risk for skin breakdown  - Assess and monitor skin integrity  - Assess and monitor nutrition and hydration status  - Monitor labs   - Assess for incontinence   - Turn and reposition patient  - Assist with mobility/ambulation  - Relieve pressure over bony prominences  - Avoid friction and shearing  - Provide appropriate hygiene as needed including keeping skin clean and dry  - Evaluate need for skin moisturizer/barrier cream  - Collaborate with interdisciplinary team   - Patient/family teaching  - Consider wound care consult   Outcome: Progressing     Problem: Potential for Falls  Goal: Patient will remain free of falls  Description: INTERVENTIONS:  - Educate patient/family on patient safety including physical limitations  - Instruct patient to call for assistance with activity   - Consult OT/PT to assist with strengthening/mobility   - Keep Call bell within reach  - Keep bed low and locked with side rails adjusted as appropriate  - Keep care items and personal belongings within reach  - Initiate and maintain comfort rounds  - Make Fall Risk Sign visible to staff  - Offer Toileting every 1 Hours, in advance of need  - Initiate/Maintain alarm  - Obtain necessary fall risk management equipment  - Apply yellow socks and bracelet for high fall risk patients  - Consider moving patient to room near nurses station  Outcome: Progressing     Problem: MOBILITY - ADULT  Goal: Maintain or return to baseline ADL function  Description: INTERVENTIONS:  -  Assess patient's ability to carry out ADLs; assess patient's baseline for ADL function and identify physical deficits which impact ability to perform ADLs (bathing, care of mouth/teeth, toileting, grooming, dressing, etc )  - Assess/evaluate cause of self-care deficits   - Assess range of motion  - Assess patient's mobility; develop plan if impaired  - Assess patient's need for assistive devices and provide as appropriate  - Encourage maximum independence but intervene and supervise when necessary  - Involve family in performance of ADLs  - Assess for home care needs following discharge   - Consider OT consult to assist with ADL evaluation and planning for discharge  - Provide patient education as appropriate  Outcome: Progressing  Goal: Maintains/Returns to pre admission functional level  Description: INTERVENTIONS:  - Perform BMAT or MOVE assessment daily    - Set and communicate daily mobility goal to care team and patient/family/caregiver  - Collaborate with rehabilitation services on mobility goals if consulted  - Perform Range of Motion 3 times a day  - Reposition patient every 2 hours  - Dangle patient 3 times a day  - Stand patient 3 times a day  - Ambulate patient 3 times a day  - Out of bed to chair 3 times a day   - Out of bed for meals 3 times a day  - Out of bed for toileting  - Record patient progress and toleration of activity level   Outcome: Progressing     Problem: Nutrition/Hydration-ADULT  Goal: Nutrient/Hydration intake appropriate for improving, restoring or maintaining nutritional needs  Description: Monitor and assess patient's nutrition/hydration status for malnutrition  Collaborate with interdisciplinary team and initiate plan and interventions as ordered  Monitor patient's weight and dietary intake as ordered or per policy  Utilize nutrition screening tool and intervene as necessary  Determine patient's food preferences and provide high-protein, high-caloric foods as appropriate       INTERVENTIONS:  - Monitor oral intake, urinary output, labs, and treatment plans  - Assess nutrition and hydration status and recommend course of action  - Evaluate amount of meals eaten  - Assist patient with eating if necessary   - Allow adequate time for meals  - Recommend/ encourage appropriate diets, oral nutritional supplements, and vitamin/mineral supplements  - Order, calculate, and assess calorie counts as needed  - Recommend, monitor, and adjust tube feedings and TPN/PPN based on assessed needs  - Assess need for intravenous fluids  - Provide specific nutrition/hydration education as appropriate  - Include patient/family/caregiver in decisions related to nutrition  Outcome: Progressing     Problem: PAIN - ADULT  Goal: Verbalizes/displays adequate comfort level or baseline comfort level  Description: Interventions:  - Encourage patient to monitor pain and request assistance  - Assess pain using appropriate pain scale  - Administer analgesics based on type and severity of pain and evaluate response  - Implement non-pharmacological measures as appropriate and evaluate response  - Consider cultural and social influences on pain and pain management  - Notify physician/advanced practitioner if interventions unsuccessful or patient reports new pain  Outcome: Progressing     Problem: INFECTION - ADULT  Goal: Absence or prevention of progression during hospitalization  Description: INTERVENTIONS:  - Assess and monitor for signs and symptoms of infection  - Monitor lab/diagnostic results  - Monitor all insertion sites, i e  indwelling lines, tubes, and drains  - Monitor endotracheal if appropriate and nasal secretions for changes in amount and color  - [place] appropriate cooling/warming therapies per order  - Administer medications as ordered  - Instruct and encourage patient and family to use good hand hygiene technique  - Identify and instruct in appropriate isolation precautions for identified infection/condition  Outcome: Progressing     Problem: DISCHARGE PLANNING  Goal: Discharge to home or other facility with appropriate resources  Description: INTERVENTIONS:  - Identify barriers to discharge w/patient and caregiver  - Arrange for needed discharge resources and transportation as appropriate  - Identify discharge learning needs (meds, wound care, etc )  - Arrange for interpretive services to assist at discharge as needed  - Refer to Case Management Department for coordinating discharge planning if the patient needs post-hospital services based on physician/advanced practitioner order or complex needs related to functional status, cognitive ability, or social support system  Outcome: Progressing     Problem: Knowledge Deficit  Goal: Patient/family/caregiver demonstrates understanding of disease process, treatment plan, medications, and discharge instructions  Description: Complete learning assessment and assess knowledge base    Interventions:  - Provide teaching at level of understanding  - Provide teaching via preferred learning methods  Outcome: Progressing

## 2022-06-27 LAB
ANION GAP SERPL CALCULATED.3IONS-SCNC: 8 MMOL/L (ref 4–13)
APTT PPP: 88 SECONDS (ref 23–37)
BUN SERPL-MCNC: 18 MG/DL (ref 5–25)
CALCIUM SERPL-MCNC: 8.7 MG/DL (ref 8.3–10.1)
CHLORIDE SERPL-SCNC: 92 MMOL/L (ref 100–108)
CO2 SERPL-SCNC: 35 MMOL/L (ref 21–32)
CREAT SERPL-MCNC: 0.68 MG/DL (ref 0.6–1.3)
DIGOXIN SERPL-MCNC: 0.8 NG/ML (ref 0.8–2)
ERYTHROCYTE [DISTWIDTH] IN BLOOD BY AUTOMATED COUNT: 15.4 % (ref 11.6–15.1)
GFR SERPL CREATININE-BSD FRML MDRD: 110 ML/MIN/1.73SQ M
GLUCOSE SERPL-MCNC: 102 MG/DL (ref 65–140)
GLUCOSE SERPL-MCNC: 123 MG/DL (ref 65–140)
GLUCOSE SERPL-MCNC: 136 MG/DL (ref 65–140)
GLUCOSE SERPL-MCNC: 155 MG/DL (ref 65–140)
GLUCOSE SERPL-MCNC: 158 MG/DL (ref 65–140)
GLUCOSE SERPL-MCNC: 161 MG/DL (ref 65–140)
HCT VFR BLD AUTO: 28.4 % (ref 36.5–49.3)
HGB BLD-MCNC: 8.7 G/DL (ref 12–17)
MAGNESIUM SERPL-MCNC: 2.2 MG/DL (ref 1.6–2.6)
MCH RBC QN AUTO: 29.2 PG (ref 26.8–34.3)
MCHC RBC AUTO-ENTMCNC: 30.6 G/DL (ref 31.4–37.4)
MCV RBC AUTO: 95 FL (ref 82–98)
PHOSPHATE SERPL-MCNC: 4.6 MG/DL (ref 2.7–4.5)
PLATELET # BLD AUTO: 239 THOUSANDS/UL (ref 149–390)
PMV BLD AUTO: 11.2 FL (ref 8.9–12.7)
POTASSIUM SERPL-SCNC: 4.3 MMOL/L (ref 3.5–5.3)
RBC # BLD AUTO: 2.98 MILLION/UL (ref 3.88–5.62)
SODIUM SERPL-SCNC: 135 MMOL/L (ref 136–145)
WBC # BLD AUTO: 11.23 THOUSAND/UL (ref 4.31–10.16)

## 2022-06-27 PROCEDURE — 94760 N-INVAS EAR/PLS OXIMETRY 1: CPT

## 2022-06-27 PROCEDURE — 83735 ASSAY OF MAGNESIUM: CPT | Performed by: SURGERY

## 2022-06-27 PROCEDURE — 97606 NEG PRS WND THER DME>50 SQCM: CPT | Performed by: SURGERY

## 2022-06-27 PROCEDURE — 94640 AIRWAY INHALATION TREATMENT: CPT | Performed by: SOCIAL WORKER

## 2022-06-27 PROCEDURE — 85027 COMPLETE CBC AUTOMATED: CPT | Performed by: SURGERY

## 2022-06-27 PROCEDURE — 82948 REAGENT STRIP/BLOOD GLUCOSE: CPT

## 2022-06-27 PROCEDURE — 80048 BASIC METABOLIC PNL TOTAL CA: CPT | Performed by: SURGERY

## 2022-06-27 PROCEDURE — 2W03X6Z CHANGE PRESSURE DRESSING ON ABDOMINAL WALL: ICD-10-PCS | Performed by: SURGERY

## 2022-06-27 PROCEDURE — 94640 AIRWAY INHALATION TREATMENT: CPT

## 2022-06-27 PROCEDURE — 84100 ASSAY OF PHOSPHORUS: CPT | Performed by: SURGERY

## 2022-06-27 PROCEDURE — 80162 ASSAY OF DIGOXIN TOTAL: CPT | Performed by: PHYSICIAN ASSISTANT

## 2022-06-27 PROCEDURE — 85730 THROMBOPLASTIN TIME PARTIAL: CPT | Performed by: SURGERY

## 2022-06-27 PROCEDURE — 94660 CPAP INITIATION&MGMT: CPT

## 2022-06-27 PROCEDURE — 99024 POSTOP FOLLOW-UP VISIT: CPT | Performed by: SURGERY

## 2022-06-27 PROCEDURE — 99231 SBSQ HOSP IP/OBS SF/LOW 25: CPT | Performed by: INTERNAL MEDICINE

## 2022-06-27 RX ADMIN — METOROPROLOL TARTRATE 2.5 MG: 5 INJECTION, SOLUTION INTRAVENOUS at 21:58

## 2022-06-27 RX ADMIN — SENNOSIDES AND DOCUSATE SODIUM 1 TABLET: 8.6; 5 TABLET ORAL at 17:43

## 2022-06-27 RX ADMIN — GABAPENTIN 300 MG: 250 SOLUTION ORAL at 22:05

## 2022-06-27 RX ADMIN — LEVALBUTEROL HYDROCHLORIDE 1.25 MG: 1.25 SOLUTION, CONCENTRATE RESPIRATORY (INHALATION) at 07:15

## 2022-06-27 RX ADMIN — BUDESONIDE AND FORMOTEROL FUMARATE DIHYDRATE 2 PUFF: 160; 4.5 AEROSOL RESPIRATORY (INHALATION) at 08:58

## 2022-06-27 RX ADMIN — DIGOXIN 125 MCG: 0.25 INJECTION INTRAMUSCULAR; INTRAVENOUS at 09:00

## 2022-06-27 RX ADMIN — METHOCARBAMOL TABLETS 500 MG: 500 TABLET, COATED ORAL at 04:54

## 2022-06-27 RX ADMIN — GABAPENTIN 300 MG: 250 SOLUTION ORAL at 09:17

## 2022-06-27 RX ADMIN — ACETAMINOPHEN 650 MG: 650 SUSPENSION ORAL at 17:47

## 2022-06-27 RX ADMIN — IPRATROPIUM BROMIDE 0.5 MG: 0.5 SOLUTION RESPIRATORY (INHALATION) at 18:59

## 2022-06-27 RX ADMIN — TORSEMIDE 40 MG: 20 TABLET ORAL at 21:58

## 2022-06-27 RX ADMIN — ACETAMINOPHEN 650 MG: 650 SUSPENSION ORAL at 04:54

## 2022-06-27 RX ADMIN — OXYCODONE HYDROCHLORIDE 5 MG: 5 SOLUTION ORAL at 17:42

## 2022-06-27 RX ADMIN — POLYETHYLENE GLYCOL 3350 17 G: 17 POWDER, FOR SOLUTION ORAL at 08:58

## 2022-06-27 RX ADMIN — ACETAMINOPHEN 650 MG: 650 SUSPENSION ORAL at 21:58

## 2022-06-27 RX ADMIN — QUETIAPINE FUMARATE 50 MG: 25 TABLET ORAL at 21:59

## 2022-06-27 RX ADMIN — IPRATROPIUM BROMIDE 0.5 MG: 0.5 SOLUTION RESPIRATORY (INHALATION) at 07:16

## 2022-06-27 RX ADMIN — GABAPENTIN 300 MG: 250 SOLUTION ORAL at 17:41

## 2022-06-27 RX ADMIN — ACETAMINOPHEN 650 MG: 650 SUSPENSION ORAL at 12:00

## 2022-06-27 RX ADMIN — Medication 20 MG: at 05:00

## 2022-06-27 RX ADMIN — LEVALBUTEROL HYDROCHLORIDE 1.25 MG: 1.25 SOLUTION, CONCENTRATE RESPIRATORY (INHALATION) at 18:59

## 2022-06-27 RX ADMIN — OXYCODONE HYDROCHLORIDE 5 MG: 5 SOLUTION ORAL at 08:58

## 2022-06-27 RX ADMIN — HYDROMORPHONE HYDROCHLORIDE 1 MG: 1 INJECTION, SOLUTION INTRAMUSCULAR; INTRAVENOUS; SUBCUTANEOUS at 11:33

## 2022-06-27 RX ADMIN — HEPARIN SODIUM 27 UNITS/KG/HR: 10000 INJECTION, SOLUTION INTRAVENOUS at 18:51

## 2022-06-27 RX ADMIN — METOROPROLOL TARTRATE 2.5 MG: 5 INJECTION, SOLUTION INTRAVENOUS at 04:54

## 2022-06-27 RX ADMIN — HEPARIN SODIUM 27 UNITS/KG/HR: 10000 INJECTION, SOLUTION INTRAVENOUS at 03:35

## 2022-06-27 RX ADMIN — INSULIN LISPRO 1 UNITS: 100 INJECTION, SOLUTION INTRAVENOUS; SUBCUTANEOUS at 17:51

## 2022-06-27 RX ADMIN — METOROPROLOL TARTRATE 2.5 MG: 5 INJECTION, SOLUTION INTRAVENOUS at 09:13

## 2022-06-27 RX ADMIN — SENNOSIDES AND DOCUSATE SODIUM 1 TABLET: 8.6; 5 TABLET ORAL at 08:57

## 2022-06-27 RX ADMIN — Medication: at 21:46

## 2022-06-27 RX ADMIN — HEPARIN SODIUM 27 UNITS/KG/HR: 10000 INJECTION, SOLUTION INTRAVENOUS at 11:35

## 2022-06-27 RX ADMIN — LEVOTHYROXINE SODIUM 25 MCG: 25 TABLET ORAL at 05:00

## 2022-06-27 RX ADMIN — METOROPROLOL TARTRATE 2.5 MG: 5 INJECTION, SOLUTION INTRAVENOUS at 15:36

## 2022-06-27 RX ADMIN — HYDROMORPHONE HYDROCHLORIDE 1 MG: 1 INJECTION, SOLUTION INTRAMUSCULAR; INTRAVENOUS; SUBCUTANEOUS at 13:25

## 2022-06-27 NOTE — PROGRESS NOTES
Progress Note - General Surgery   Gina Levy 47 y o  male MRN: [id number]  Unit/Bed#: [facility] 773-93 Encounter: [id number]    Assessment:  48yoM p/w SBO 2/2 chronic incarcerated ventral hernia now s/p   6/14  ex lap, HARVEY 6/14, also s/p abdominal washout, repair of serosal tears,  6/15 washout,abhera, VAC DPR  6/17 bridging vicryl mesh placement, wound vac placement      Last febrile to 100 8 at 10:30 p m  Yesterday evening, tachy to 108, on BiPAP HS  WBC 11 23 from 8 8, hemoglobin 8 7 from 8 6  Creatinine 0 68     UOP 1000  NGT 1800  Abdominal VAC 90    Plan:  - NPO/NGT  - PICC/TPN  - abdominal VAC, monitor output and character  - cardiology on board, appreciate recommendations, currently on heparin drip  - work with PT  - pain control  - incentive spirometry  - BiPAP HS    Subjective/Objective   Subjective:   No acute events overnight, patient doing well this morning, denies any pain, currently on BiPAP during interview in room, voiding  Objective:     Blood pressure 128/71, pulse 100, temperature 100 3 °F (37 9 °C), resp  rate (!) 27, height 5' 11" (1 803 m), weight (!) 230 kg (506 lb 2 8 oz), SpO2 98 %  ,Body mass index is 70 6 kg/m²        Intake/Output Summary (Last 24 hours) at 6/27/2022 2015  Last data filed at 6/27/2022 0555  Gross per 24 hour   Intake 3603 99 ml   Output 2890 ml   Net 713 99 ml       Invasive Devices  Report    Peripherally Inserted Central Catheter Line  Duration           PICC Line 81/68/42 Right Basilic 13 days          Drain  Duration           External Urinary Catheter 5 days    NG/OG/Enteral Tube Nasogastric 18 Fr Right nare 3 days                Physical Exam:  General: NAD  Skin: Warm, dry, anicteric  HEENT: Normocephalic, atraumatic  CV: RRR, no m/r/g  Pulm: CTA b/l, no inc WOB, BiPAP  Abd: Soft, ND, nontender, abdominal VAC in place draining serosanguineous output  MSK: Symmetric, no edema, no tenderness, no deformity  Neuro: AOx3, GCS 15    Lab, Imaging and other studies:  I have personally reviewed pertinent lab results    , CBC:   Lab Results   Component Value Date    WBC 11 23 (H) 06/27/2022    HGB 8 7 (L) 06/27/2022    HCT 28 4 (L) 06/27/2022    MCV 95 06/27/2022     06/27/2022    MCH 29 2 06/27/2022    MCHC 30 6 (L) 06/27/2022    RDW 15 4 (H) 06/27/2022    MPV 11 2 06/27/2022   , CMP:   Lab Results   Component Value Date    SODIUM 135 (L) 06/27/2022    K 4 3 06/27/2022    CL 92 (L) 06/27/2022    CO2 35 (H) 06/27/2022    BUN 18 06/27/2022    CREATININE 0 68 06/27/2022    CALCIUM 8 7 06/27/2022    EGFR 110 06/27/2022     VTE Pharmacologic Prophylaxis: Sequential compression device (Venodyne)  and Heparin  VTE Mechanical Prophylaxis: sequential compression device

## 2022-06-27 NOTE — PLAN OF CARE
Problem: Prexisting or High Potential for Compromised Skin Integrity  Goal: Skin integrity is maintained or improved  Description: INTERVENTIONS:  - Identify patients at risk for skin breakdown  - Assess and monitor skin integrity  - Assess and monitor nutrition and hydration status  - Monitor labs   - Assess for incontinence   - Turn and reposition patient  - Assist with mobility/ambulation  - Relieve pressure over bony prominences  - Avoid friction and shearing  - Provide appropriate hygiene as needed including keeping skin clean and dry  - Evaluate need for skin moisturizer/barrier cream  - Collaborate with interdisciplinary team   - Patient/family teaching  - Consider wound care consult   Outcome: Progressing     Problem: Potential for Falls  Goal: Patient will remain free of falls  Description: INTERVENTIONS:  - Educate patient/family on patient safety including physical limitations  - Instruct patient to call for assistance with activity   - Consult OT/PT to assist with strengthening/mobility   - Keep Call bell within reach  - Keep bed low and locked with side rails adjusted as appropriate  - Keep care items and personal belongings within reach  - Initiate and maintain comfort rounds  - Make Fall Risk Sign visible to staff  - Offer Toileting every 1 Hours, in advance of need  - Initiate/Maintain alarm  - Obtain necessary fall risk management equipment  - Apply yellow socks and bracelet for high fall risk patients  - Consider moving patient to room near nurses station  Outcome: Progressing     Problem: MOBILITY - ADULT  Goal: Maintain or return to baseline ADL function  Description: INTERVENTIONS:  -  Assess patient's ability to carry out ADLs; assess patient's baseline for ADL function and identify physical deficits which impact ability to perform ADLs (bathing, care of mouth/teeth, toileting, grooming, dressing, etc )  - Assess/evaluate cause of self-care deficits   - Assess range of motion  - Assess patient's mobility; develop plan if impaired  - Assess patient's need for assistive devices and provide as appropriate  - Encourage maximum independence but intervene and supervise when necessary  - Involve family in performance of ADLs  - Assess for home care needs following discharge   - Consider OT consult to assist with ADL evaluation and planning for discharge  - Provide patient education as appropriate  Outcome: Progressing  Goal: Maintains/Returns to pre admission functional level  Description: INTERVENTIONS:  - Perform BMAT or MOVE assessment daily    - Set and communicate daily mobility goal to care team and patient/family/caregiver  - Collaborate with rehabilitation services on mobility goals if consulted  - Perform Range of Motion 3 times a day  - Reposition patient every 2 hours  - Dangle patient 3 times a day  - Stand patient 3 times a day  - Ambulate patient 3 times a day  - Out of bed to chair 3 times a day   - Out of bed for meals 3 times a day  - Out of bed for toileting  - Record patient progress and toleration of activity level   Outcome: Progressing     Problem: Nutrition/Hydration-ADULT  Goal: Nutrient/Hydration intake appropriate for improving, restoring or maintaining nutritional needs  Description: Monitor and assess patient's nutrition/hydration status for malnutrition  Collaborate with interdisciplinary team and initiate plan and interventions as ordered  Monitor patient's weight and dietary intake as ordered or per policy  Utilize nutrition screening tool and intervene as necessary  Determine patient's food preferences and provide high-protein, high-caloric foods as appropriate       INTERVENTIONS:  - Monitor oral intake, urinary output, labs, and treatment plans  - Assess nutrition and hydration status and recommend course of action  - Evaluate amount of meals eaten  - Assist patient with eating if necessary   - Allow adequate time for meals  - Recommend/ encourage appropriate diets, oral nutritional supplements, and vitamin/mineral supplements  - Order, calculate, and assess calorie counts as needed  - Recommend, monitor, and adjust tube feedings and TPN/PPN based on assessed needs  - Assess need for intravenous fluids  - Provide specific nutrition/hydration education as appropriate  - Include patient/family/caregiver in decisions related to nutrition  Outcome: Progressing     Problem: PAIN - ADULT  Goal: Verbalizes/displays adequate comfort level or baseline comfort level  Description: Interventions:  - Encourage patient to monitor pain and request assistance  - Assess pain using appropriate pain scale  - Administer analgesics based on type and severity of pain and evaluate response  - Implement non-pharmacological measures as appropriate and evaluate response  - Consider cultural and social influences on pain and pain management  - Notify physician/advanced practitioner if interventions unsuccessful or patient reports new pain  Outcome: Progressing     Problem: INFECTION - ADULT  Goal: Absence or prevention of progression during hospitalization  Description: INTERVENTIONS:  - Assess and monitor for signs and symptoms of infection  - Monitor lab/diagnostic results  - Monitor all insertion sites, i e  indwelling lines, tubes, and drains  - Monitor endotracheal if appropriate and nasal secretions for changes in amount and color  - [place] appropriate cooling/warming therapies per order  - Administer medications as ordered  - Instruct and encourage patient and family to use good hand hygiene technique  - Identify and instruct in appropriate isolation precautions for identified infection/condition  Outcome: Progressing     Problem: DISCHARGE PLANNING  Goal: Discharge to home or other facility with appropriate resources  Description: INTERVENTIONS:  - Identify barriers to discharge w/patient and caregiver  - Arrange for needed discharge resources and transportation as appropriate  - Identify discharge learning needs (meds, wound care, etc )  - Arrange for interpretive services to assist at discharge as needed  - Refer to Case Management Department for coordinating discharge planning if the patient needs post-hospital services based on physician/advanced practitioner order or complex needs related to functional status, cognitive ability, or social support system  Outcome: Progressing     Problem: Knowledge Deficit  Goal: Patient/family/caregiver demonstrates understanding of disease process, treatment plan, medications, and discharge instructions  Description: Complete learning assessment and assess knowledge base    Interventions:  - Provide teaching at level of understanding  - Provide teaching via preferred learning methods  Outcome: Progressing

## 2022-06-27 NOTE — PROGRESS NOTES
Reason for Consult / Principal Problem:Afib with RVR    Physician Requesting Consult:  Jeremy Mejia MD    Cardiologist: Dr Sheryle Latina and Plan         Current Problem List   Principal Problem:    SBO  Active Problems:    Atrial fibrillation with RVR ([facility])    COPD (chronic obstructive pulmonary disease) ([facility] [address] )    Acute respiratory failure ([facility])     Assessment/Plan:  1   Permenant Atrial Fibrillation - with difficult to control rates in the setting of SBO   Continue IV digoxin and metoprolol due to PO medications not being given  ? Patient is well rate controlled on the IV digoxin and metoprolol  ? Blood pressure is doing well on the metoprolol  ? Continue IV heparin, would transition back to oral anticoagulation one there is no more plan for further procedures and patient can tolerate PO medication    ? Monitor telemetry  2   Chronic diastolic CHF - not in acute exacerbation,   ?  Does appear to have iatrogenic fluid overload - has been on intermittent diuresis, consider additional diureses  3  Acute respiratory failure per primary  · Continue to wear BiPAP when sleeping         Subjective        CC: Afib with RVR      24 HR / Overnight Events:    Patient reports intermittent episodes of shortness of breath where he cannot catch his breath  These episodes last for "hours", he states this happens to him often and also occur at home  He says that during them he needs to breathe deeper to catch his breath and also feels like his heart is beating slower when they are occurring  No dizziness or CP when they are occurring  Says this is a chronic problem     Patient continues rate controlled (90's-100's) over the past 24 hours       Temp 100 8 last night    He is still NPO due to significant NG tube output (1 8L)     94% on 2L NC     He wore his BiPAP the entire time he slept last night      Telemetry: Afib with and without RVR   No episodes of NSVT     Net fluid balance:  Positive 1 4L since yesterday (6/26)     Labs: HGB: 8 7, PTT 88, K+: 4 3  Digoxin level:  8, Creat:  68 from   59         Family History:   Family History   Problem Relation Age of Onset   [employer] Lung cancer Father     Diabetes Maternal Aunt     Heart attack Mother     Clotting disorder Mother     Hypertension Mother     Heart failure Mother     Diabetes Mother     Atrial fibrillation Mother     Sleep apnea Mother     Obesity Mother     Asthma Sister     Stroke Neg Hx     Anuerysm Neg Hx     Arrhythmia Neg Hx     Hyperlipidemia Neg Hx         pt unsure    Fainting Neg Hx      Historical Information   Past Medical History:   Diagnosis Date    Afib ([place] [address] )     Anemia     Anxiety     Cancer ([place] [address] )     Cardiac disease     Cellulitis     COPD (chronic obstructive pulmonary disease) ([place] [address] )     Depression     Diabetes mellitus ([place] [address] )     [facility]     GERD (gastroesophageal reflux disease)     HBP (high blood pressure)     Heart failure ([place] [address] )     Hx of blood clots     Hypertension     Hypokalemia     Hypothyroid     Obesity     Psychiatric disorder      Past Surgical History:   Procedure Laterality Date    ABDOMINAL HERNIA REPAIR  05/2019    CHOLECYSTECTOMY      Lap    GASTRECTOMY SLEEVE LAPAROSCOPIC  08/2018    INCISION AND DRAINAGE OF WOUND Bilateral 12/01/2021    Procedure: INCISION AND DRAINAGE (I&D) HEAD/FACE;  Surgeon: Addison Mandel MD;  Location:  MAIN OR;  Service: General    IR PICC PLACEMENT DOUBLE LUMEN  06/13/2022    IVC FILTER INSERTION  2018    KNEE SURGERY Right     open wound, injury     LAPAROTOMY N/A 6/14/2022    Procedure: LAPAROTOMY EXPLORATORY, EXTENSIVE LYSIS OF ADHESIONS, APPLICATION OF ABTHERA WOUND VAC;  Surgeon: Shreya Mejia MD;  Location: [facility] MAIN OR;  Service: General    LAPAROTOMY N/A 6/15/2022    Procedure: LAPAROTOMY EXPLORATORY WITH REPAIR OF SEROSAL INJURY;  Surgeon: Nicolas Brandt MD;  Location:  MAIN OR;  Service: General    LEG SURGERY Right 2009    US GUIDED VASCULAR ACCESS  08/06/2018    VAC DRESSING APPLICATION N/A 4/15/5369    Procedure: CHANGE DRESSING/VAC ABDOMEN;  Surgeon: Verner Hatchet, MD;  Location: [facility] MAIN OR;  Service: General    VAC DRESSING APPLICATION N/A 9/01/9116    Procedure: ABDOMINAL WASHOUT, REPAIR OF SEROSAL TEARS,BRIDING VICRYL MESH, PARTIAL CLOSURE, APPLICATION OF WOUND VAC;  Surgeon: Laisha Ewing DO;  Location: [facility] MAIN OR;  Service: General     Social History   Social History     Substance and Sexual Activity   Alcohol Use Not Currently     Social History     Substance and Sexual Activity   Drug Use No     Social History     Tobacco Use   Smoking Status Former Smoker    Packs/day: 1 00    Years: 15 00    Pack years: 15 00   Smokeless Tobacco Never Used   Tobacco Comment    1 5ppd x 23 years, quit 2014     Family History:   Family History   Problem Relation Age of Onset   [facility] Lung [facility] Father     Diabetes Maternal Aunt     Heart attack Mother     Clotting disorder Mother     Hypertension Mother     Heart failure Mother     Diabetes Mother     Atrial fibrillation Mother     Sleep apnea Mother     Obesity Mother     Asthma Sister     Stroke Neg Hx     Anuerysm Neg Hx     Arrhythmia Neg Hx     Hyperlipidemia Neg Hx         pt unsure    Fainting Neg Hx        Review of Systems:  Review of Systems        Scheduled Meds:  Current Facility-Administered Medications   Medication Dose Route Frequency Provider Last Rate    acetaminophen  650 mg Oral Q6H Alverto Siddiqui PA-C      Adult TPN (CUSTOM BASE/CUSTOM ELECTROLYTE)   Intravenous Continuous TPN Traci Black MD 75 1 mL/hr at 06/26/22 2101    Adult TPN (CUSTOM BASE/CUSTOM ELECTROLYTE)   Intravenous Continuous TPN Ronen Chaney MD      bisacodyl  10 mg Rectal Daily Alverto Siddiqui PA-C      budesonide-formoterol  2 puff Inhalation BID Alverto Siddiqui PA-C      digoxin  125 mcg Intravenous Daily Denzil Fothergill, DO  gabapentin  300 mg Oral TID Flora Sanchez PA-C      heparin (porcine)  3-30 Units/kg/hr (Order-Specific) Intravenous Titrated Flora Sanchez PA-C 27 Units/kg/hr (06/27/22 0335)    heparin (porcine)  10,000 Units Intravenous Q1H PRN Flora Sanchez PA-C      heparin (porcine)  5,000 Units Intravenous Q1H PRN Flora Sanchez PA-C      HYDROmorphone  1 mg Intravenous Q4H PRN Flora Sanchez PA-C      insulin lispro  1-5 Units Subcutaneous Q6H [address] Tommy Estrellita Cordoba PA-C      ipratropium  0 5 mg Nebulization TID [facility]      levalbuterol  1 25 mg Nebulization TID Flora Sanchez PA-C      levothyroxine  25 mcg Oral Early Morning Flora Sanchez PA-C      methocarbamol  500 mg Oral Q6H PRN Flora Sanchez PA-C      metoclopramide  10 mg Intravenous Q6H PRN Félix Mills MD      metoprolol  2 5 mg Intravenous Q6H Ruth Coker DO      omeprazole (PRILOSEC) suspension 2 mg/mL  20 mg Oral Early Morning Flora Sanchez PA-C      ondansetron  4 mg Intravenous Q4H PRN Carmen Porter MD      oxyCODONE  10 mg Oral Q4H PRN [facility]      oxyCODONE  5 mg Oral Q4H PRN [facility]      polyethylene glycol  17 g Oral Daily Flora Sanchez PA-C      QUEtiapine  50 mg Oral HS Flora Sanchez PA-C      senna-docusate sodium  1 tablet Oral BID Flora Sanchez PA-C      spironolactone  50 mg Per NG Tube Daily Hellen R JIMENA Cordoba      torsemide  40 mg Oral BID Hellen R JIMENA Cordoba       Continuous Infusions:Adult TPN (CUSTOM BASE/CUSTOM ELECTROLYTE), , Last Rate: 75 1 mL/hr at 06/26/22 2101  Adult TPN (CUSTOM BASE/CUSTOM ELECTROLYTE),   heparin (porcine), 3-30 Units/kg/hr (Order-Specific), Last Rate: 27 Units/kg/hr (06/27/22 0335)      PRN Meds:   heparin (porcine)    heparin (porcine)    HYDROmorphone    methocarbamol    metoclopramide    ondansetron    oxyCODONE    oxyCODONE  all current active meds have been reviewed        Allergies   Allergen Reactions    Penicillins Hives       Objective   Vitals: Temp (24hrs), Av 4 °F (37 4 °C), Min:98 2 °F (36 8 °C), Max:100 8 °F (38 2 °C)  Current: Temperature: 98 6 °F (37 °C)  Patient Vitals for the past 24 hrs:   BP Temp Temp src Pulse Resp SpO2   22 0714 109/62 98 6 °F (37 °C) Axillary 100 22 98 %   22 0236 -- -- -- -- -- 98 %   22 0230 128/71 100 3 °F (37 9 °C) -- 100 (!) 27 99 %   22 2305 -- -- -- -- -- 99 %   225 114/67 (!) 100 8 °F (38 2 °C) -- (!) 108 22 94 %   22 -- -- -- -- -- 95 %   22 1927 132/88 98 2 °F (36 8 °C) -- 105 16 95 %   22 1546 133/81 98 2 °F (36 8 °C) -- (!) 108 16 96 %   22 1542 133/81 100 4 °F (38 °C) -- (!) 107 16 93 %   22 1324 -- -- -- -- -- 94 %   22 1058 103/61 -- -- (!) 107 -- 90 %   22 0937 109/64 -- -- 94 -- 94 %    Body mass index is 70 6 kg/m²    Orthostatic Blood Pressures    Flowsheet Row Most Recent Value   Blood Pressure 109/62 filed at 2022 3976   Patient Position - Orthostatic VS Lying filed at 2022 0714              Invasive Devices  Report    Peripherally Inserted Central Catheter Line  Duration           PICC Line 15/22/82 Right Basilic 13 days          Drain  Duration           External Urinary Catheter 5 days    NG/OG/Enteral Tube Nasogastric 18 Fr Right nare 3 days                Physical Exam:  Physical Exam        Lab Results:   Results from last 7 days   Lab Units 22  0501 22  0653 22  0525 22  0000 22  0538 22  0543 22   WBC Thousand/uL 11 23* 8 77 8 59 7 96 8 43 9 31 10 28* 7 47   HEMOGLOBIN g/dL 8 7* 8 6* 8 9* 8 5* 9 1* 8 8* 9 5* 8 5*   HEMATOCRIT % 28 4* 29 0* 29 6* 28 7* 29 0* 28 9* 31 1* 27 2*   PLATELETS Thousands/uL 239 248 271 260 270 239 210 170   NEUTROS PCT %  --   --   --   --   --   --   --  70   MONOS PCT %  --   --   --   --   --   -- --  9   MONO PCT %  --   --   --   --  2*  --   --   --       Results from last 7 days   Lab Units 06/27/22  0501 06/26/22 0653 06/26/22 0106 06/25/22 1822 06/25/22 1126 06/25/22 0525 06/24/22 2115 06/24/22  1334 06/24/22  0538 06/23/22  0543 06/22/22 0623 06/21/22 0522   SODIUM mmol/L 135* 135*  --   --   --  136  --   --  139 140 137 137   POTASSIUM mmol/L 4 3 4 2  --   --   --  4 2  --   --  4 4 3 9 3 7 3 5   CHLORIDE mmol/L 92* 93*  --   --   --  98*  --   --  99* 98* 98* 99*   CO2 mmol/L 35* 38*  --   --   --  34*  --   --  35* 35* 36* 33*   BUN mg/dL 18 17  --   --   --  17  --   --  18 19 20 14   CREATININE mg/dL 0 68 0 64  --   --   --  0 50*  --   --  0 47* 0 49* 0 53* 0 50*   CALCIUM mg/dL 8 7 8 9  --   --   --  9 0  --   --  9 0 9 2 8 9 8 3   MAGNESIUM mg/dL 2 2 2 2  --   --   --  2 4  --   --   --  2 5 2 2 2 2   PHOSPHORUS mg/dL 4 6* 5 0*  --   --   --  4 3  --   --   --  3 6 4 6* 3 2   PTT seconds 88* 75* 77* 95* 53* 61* 59* 139* 47* 88* 64* 60*   EGFR ml/min/1 73sq m 110 113  --   --   --  125  --   --  128 126 122 125     Results from last 7 days   Lab Units 06/27/22  0501 06/26/22 0653 06/26/22 0106 06/25/22 1822 06/25/22 1126 06/25/22  0525 06/24/22 2115 06/24/22  1334 06/24/22  0538 06/23/22  0543 06/22/22 0623 06/21/22 0522   PTT seconds 88* 75* 77* 95* 53* 61* 59* 139* 47* 88* 64* 60*             No results found for: PHART, AFG7YAY, PO2ART, AZM9KQC, J9IOAVEW, BEART, SOURCE  No components found for: HIV1X2  No results found for: HAV, HEPAIGM, HEPBIGM, HEPBCAB, HBEAG, HEPCAB  No results found for: SPEP, UPEP   Lab Results   Component Value Date    HGBA1C 5 6 06/07/2022    HGBA1C 5 4 03/21/2022    HGBA1C 5 5 12/22/2021     No results found for: CHOL   Lab Results   Component Value Date    HDL 44 08/16/2016      Lab Results   Component Value Date    LDLCALC 80 08/16/2016      Lab Results   Component Value Date    TRIG 124 06/22/2022    TRIG 556 (H) 06/19/2022    TRIG 172 (H) 06/13/2022     No components found for: PROCAL          Imaging: I have personally reviewed pertinent reports

## 2022-06-27 NOTE — PROGRESS NOTES
Spoke with Matilda Cabral of General Surgery that the tape had come off the patient's NG tube this AM and that the NG tube was reinserted up to the marked line  NG tube's exterior length was measured and charted but unable to compare it to any previous exterior lengths as no previous lengths were documented  Able to auscultate placement and bile is being suctioned by the NG tube, though there is less output now compared to overnight  Matilda Cabral aware and okayed continued use of NG tube

## 2022-06-27 NOTE — PROGRESS NOTES
Acute Care Surgery  Bedside V A C  Procedure Note    Location of wound: Abdomen    Dressings and Foam removed:  1 Black Foam  5 White Foam    Dimensions of wound: 30 cm x 22 cm x 0 5 cm    Description of wound: Abdomen w skin open, underlying mesh, mesh in place and intact, surrounding skin edges healthy appearing  Skin on the right lateral to wound w some erythema/superficial irritation  Upon palpation of the area it is soft, no induration or crepitus, expressed some minimal drainage from under the skin edge on the right side, irrigated area w saline  VAC dressing application:  Premedicated w IV analgesic medication  The periwound skin was cleaned and dried  4 black foam, 6 white foam were cut to size of the wound and placed into the wound  The dressings were then covered with VAC drape  The track pad was then placed over the base of black foam  The VAC was then set to 125 mmHg low Continuous suction  The patient tolerated the procedure well and there were no complications  The patient did not require any excisional debridement during today's dressing change  VAC settings:  125 mmHg  Continuous    Wound Images:        Right side of patient, some erythema of skin, minimally tender, soft to touch w/o induration or crepitus, not fluctuant      Additional Notes: The [facility] sticker placed over the dressing per protocol  The next [facility] dressing change will be planned for 6/29      Irving Lainez MD  6/27/2022 1:38 PM

## 2022-06-28 LAB
ANION GAP SERPL CALCULATED.3IONS-SCNC: 2 MMOL/L (ref 4–13)
APTT PPP: 156 SECONDS (ref 23–37)
APTT PPP: 71 SECONDS (ref 23–37)
APTT PPP: 78 SECONDS (ref 23–37)
BUN SERPL-MCNC: 17 MG/DL (ref 5–25)
CALCIUM SERPL-MCNC: 8.9 MG/DL (ref 8.3–10.1)
CHLORIDE SERPL-SCNC: 93 MMOL/L (ref 100–108)
CO2 SERPL-SCNC: 39 MMOL/L (ref 21–32)
CREAT SERPL-MCNC: 0.7 MG/DL (ref 0.6–1.3)
ERYTHROCYTE [DISTWIDTH] IN BLOOD BY AUTOMATED COUNT: 15.3 % (ref 11.6–15.1)
GFR SERPL CREATININE-BSD FRML MDRD: 109 ML/MIN/1.73SQ M
GLUCOSE SERPL-MCNC: 125 MG/DL (ref 65–140)
GLUCOSE SERPL-MCNC: 133 MG/DL (ref 65–140)
GLUCOSE SERPL-MCNC: 134 MG/DL (ref 65–140)
GLUCOSE SERPL-MCNC: 147 MG/DL (ref 65–140)
GLUCOSE SERPL-MCNC: 150 MG/DL (ref 65–140)
HCT VFR BLD AUTO: 29.3 % (ref 36.5–49.3)
HGB BLD-MCNC: 9.2 G/DL (ref 12–17)
MAGNESIUM SERPL-MCNC: 2.3 MG/DL (ref 1.6–2.6)
MCH RBC QN AUTO: 29 PG (ref 26.8–34.3)
MCHC RBC AUTO-ENTMCNC: 31.4 G/DL (ref 31.4–37.4)
MCV RBC AUTO: 92 FL (ref 82–98)
PHOSPHATE SERPL-MCNC: 3.3 MG/DL (ref 2.7–4.5)
PLATELET # BLD AUTO: 259 THOUSANDS/UL (ref 149–390)
PMV BLD AUTO: 10.9 FL (ref 8.9–12.7)
POTASSIUM SERPL-SCNC: 3.9 MMOL/L (ref 3.5–5.3)
RBC # BLD AUTO: 3.17 MILLION/UL (ref 3.88–5.62)
SODIUM SERPL-SCNC: 134 MMOL/L (ref 136–145)
WBC # BLD AUTO: 9.38 THOUSAND/UL (ref 4.31–10.16)

## 2022-06-28 PROCEDURE — 85730 THROMBOPLASTIN TIME PARTIAL: CPT | Performed by: SURGERY

## 2022-06-28 PROCEDURE — 94760 N-INVAS EAR/PLS OXIMETRY 1: CPT

## 2022-06-28 PROCEDURE — 82948 REAGENT STRIP/BLOOD GLUCOSE: CPT

## 2022-06-28 PROCEDURE — 99024 POSTOP FOLLOW-UP VISIT: CPT | Performed by: SURGERY

## 2022-06-28 PROCEDURE — 80048 BASIC METABOLIC PNL TOTAL CA: CPT

## 2022-06-28 PROCEDURE — 85027 COMPLETE CBC AUTOMATED: CPT

## 2022-06-28 PROCEDURE — 84100 ASSAY OF PHOSPHORUS: CPT

## 2022-06-28 PROCEDURE — 83735 ASSAY OF MAGNESIUM: CPT

## 2022-06-28 PROCEDURE — 94640 AIRWAY INHALATION TREATMENT: CPT

## 2022-06-28 PROCEDURE — 94660 CPAP INITIATION&MGMT: CPT

## 2022-06-28 PROCEDURE — 99231 SBSQ HOSP IP/OBS SF/LOW 25: CPT | Performed by: INTERNAL MEDICINE

## 2022-06-28 RX ORDER — HYDROMORPHONE HCL/PF 1 MG/ML
1 SYRINGE (ML) INJECTION EVERY 6 HOURS PRN
Status: DISCONTINUED | OUTPATIENT
Start: 2022-06-28 | End: 2022-07-18

## 2022-06-28 RX ORDER — DIGOXIN 125 MCG
125 TABLET ORAL DAILY
Status: DISCONTINUED | OUTPATIENT
Start: 2022-06-28 | End: 2022-06-29

## 2022-06-28 RX ADMIN — BUDESONIDE AND FORMOTEROL FUMARATE DIHYDRATE 2 PUFF: 160; 4.5 AEROSOL RESPIRATORY (INHALATION) at 09:45

## 2022-06-28 RX ADMIN — SENNOSIDES AND DOCUSATE SODIUM 1 TABLET: 8.6; 5 TABLET ORAL at 09:44

## 2022-06-28 RX ADMIN — IPRATROPIUM BROMIDE 0.5 MG: 0.5 SOLUTION RESPIRATORY (INHALATION) at 14:06

## 2022-06-28 RX ADMIN — IPRATROPIUM BROMIDE 0.5 MG: 0.5 SOLUTION RESPIRATORY (INHALATION) at 20:41

## 2022-06-28 RX ADMIN — HEPARIN SODIUM 27 UNITS/KG/HR: 10000 INJECTION, SOLUTION INTRAVENOUS at 01:59

## 2022-06-28 RX ADMIN — HEPARIN SODIUM 24 UNITS/KG/HR: 10000 INJECTION, SOLUTION INTRAVENOUS at 20:06

## 2022-06-28 RX ADMIN — TORSEMIDE 40 MG: 20 TABLET ORAL at 21:58

## 2022-06-28 RX ADMIN — SENNOSIDES AND DOCUSATE SODIUM 1 TABLET: 8.6; 5 TABLET ORAL at 17:03

## 2022-06-28 RX ADMIN — LEVALBUTEROL HYDROCHLORIDE 1.25 MG: 1.25 SOLUTION, CONCENTRATE RESPIRATORY (INHALATION) at 09:11

## 2022-06-28 RX ADMIN — LEVOTHYROXINE SODIUM 25 MCG: 25 TABLET ORAL at 06:03

## 2022-06-28 RX ADMIN — IPRATROPIUM BROMIDE 0.5 MG: 0.5 SOLUTION RESPIRATORY (INHALATION) at 09:11

## 2022-06-28 RX ADMIN — Medication 20 MG: at 06:02

## 2022-06-28 RX ADMIN — GABAPENTIN 300 MG: 250 SOLUTION ORAL at 09:45

## 2022-06-28 RX ADMIN — OXYCODONE HYDROCHLORIDE 5 MG: 5 SOLUTION ORAL at 14:57

## 2022-06-28 RX ADMIN — QUETIAPINE FUMARATE 50 MG: 25 TABLET ORAL at 21:58

## 2022-06-28 RX ADMIN — ACETAMINOPHEN 650 MG: 650 SUSPENSION ORAL at 06:02

## 2022-06-28 RX ADMIN — DIGOXIN 125 MCG: 125 TABLET ORAL at 09:44

## 2022-06-28 RX ADMIN — OXYCODONE HYDROCHLORIDE 5 MG: 5 SOLUTION ORAL at 21:58

## 2022-06-28 RX ADMIN — METOROPROLOL TARTRATE 2.5 MG: 5 INJECTION, SOLUTION INTRAVENOUS at 06:02

## 2022-06-28 RX ADMIN — Medication: at 22:06

## 2022-06-28 RX ADMIN — LEVALBUTEROL HYDROCHLORIDE 1.25 MG: 1.25 SOLUTION, CONCENTRATE RESPIRATORY (INHALATION) at 14:06

## 2022-06-28 RX ADMIN — HYDROMORPHONE HYDROCHLORIDE 1 MG: 1 INJECTION, SOLUTION INTRAMUSCULAR; INTRAVENOUS; SUBCUTANEOUS at 02:00

## 2022-06-28 RX ADMIN — Medication 12.5 MG: at 21:58

## 2022-06-28 RX ADMIN — POLYETHYLENE GLYCOL 3350 17 G: 17 POWDER, FOR SOLUTION ORAL at 09:44

## 2022-06-28 RX ADMIN — ACETAMINOPHEN 650 MG: 650 SUSPENSION ORAL at 11:46

## 2022-06-28 RX ADMIN — ACETAMINOPHEN 650 MG: 650 SUSPENSION ORAL at 17:03

## 2022-06-28 RX ADMIN — LEVALBUTEROL HYDROCHLORIDE 1.25 MG: 1.25 SOLUTION, CONCENTRATE RESPIRATORY (INHALATION) at 20:41

## 2022-06-28 RX ADMIN — HEPARIN SODIUM 24 UNITS/KG/HR: 10000 INJECTION, SOLUTION INTRAVENOUS at 11:46

## 2022-06-28 RX ADMIN — GABAPENTIN 300 MG: 250 SOLUTION ORAL at 17:04

## 2022-06-28 RX ADMIN — GABAPENTIN 300 MG: 250 SOLUTION ORAL at 22:00

## 2022-06-28 NOTE — RESTORATIVE TECHNICIAN NOTE
Restorative Technician Note      Patient Name: Ziyad Ayers     Note Type: Mobility  Activity Performed:  Other (Comment) (Bed level exercises provided therabands for UB exercise)

## 2022-06-28 NOTE — NUTRITION
06/28/22 1247   Recommendations/Interventions   Summary Patient remains NPO with NGT in place  TPN providing majority of nutritional needs  Abdominal wound VAC in place, generalized +3 edema  Interventions/Recommendations Adjust EN/ PN;Lab consider order (Specify)   Recommendations to Provider Suggest adjust TPN to: 900 ml 15% aa, 600 ml 50% dex, 250 ml 20% lipids (2060 kcal, 135 gms protein, 1750 ml tv)  Monitor electrolytes and Triglycerides

## 2022-06-28 NOTE — PLAN OF CARE
Problem: Prexisting or High Potential for Compromised Skin Integrity  Goal: Skin integrity is maintained or improved  Description: INTERVENTIONS:  - Identify patients at risk for skin breakdown  - Assess and monitor skin integrity  - Assess and monitor nutrition and hydration status  - Monitor labs   - Assess for incontinence   - Turn and reposition patient  - Assist with mobility/ambulation  - Relieve pressure over bony prominences  - Avoid friction and shearing  - Provide appropriate hygiene as needed including keeping skin clean and dry  - Evaluate need for skin moisturizer/barrier cream  - Collaborate with interdisciplinary team   - Patient/family teaching  - Consider wound care consult   6/27/2022 2038 by Lea Garrett RN  Outcome: Not Progressing  6/27/2022 2038 by Lea Garrett RN  Outcome: Not Progressing

## 2022-06-28 NOTE — PROGRESS NOTES
Reason for Consult / Principal Problem: Afib with RVR    Physician Requesting Consult:  Ricardo Colmenares MD    Cardiologist: Dr Hill Reyna and Plan      Current Problem List   Principal Problem:    SBO  Active Problems:    Atrial fibrillation with RVR ([facility] )    COPD (chronic obstructive pulmonary disease) ([facility] )    Acute respiratory failure (HCC)    Assessment/Plan:  1   Permenant Atrial Fibrillation - with difficult to control rates in the setting of SBO      · Attempt to give patient PO/NG Digoxin and metoprolol  Hold NGT suction for 45 minutes after giving NG medication  This process has been working for his PO gabapentin and protonix the past few days  · Pay close attention to rate control as we transition to PO/NG medications  ? Blood pressure is doing well on the metoprolol  ? Continue IV heparin, would transition back to oral anticoagulation one there is no more plan for further procedures and patient can tolerate PO medication    ? Monitor telemetry  2   Chronic diastolic CHF - not in acute exacerbation,   ?  Does appear to have iatrogenic fluid overload - has been on intermittent diuresis, consider additional diureses  3  Acute respiratory failure per primary  ? Continue to wear BiPAP when sleeping    4  Pulmonary Artery Aneurysm:   · Noted on several previous echos  Most recent measurement 5 4cm  · Needs continued outpatient surveillance given his acute presentation would not be appropriate for any surgical intervention this hospital stay  · Follow up with thoracic surgery as outpatient     Subjective     CC: Afib with RVR      24 HR / Overnight Events:     No cardiac complaints this morning  Patient continues rate controlled (90's-100's) over the past 24 hours       Medications are being given through the NG tube and suction is being held for 30-45 minutes       94% on 2L NC     He did not wear his BiPAP at all last night     Telemetry: Afib with and without RVR   No episodes of NSVT     Net fluid balance:  Negitive 1 6L since yesterday (6/27)     Labs: HGB: 9 2, XLJ 587, K+: 3 9  Digoxin level:  8 (6/27), Creat:  70 from   76      Family History: non-contributory  Historical Information   Past Medical History:   Diagnosis Date    Afib ([facility] )     Anemia     Anxiety     Cancer ([facility] )     Cardiac disease     Cellulitis     COPD (chronic obstructive pulmonary disease) (HCC)     Depression     Diabetes mellitus (HCC)     DVT (deep venous thrombosis) (HCC)     GERD (gastroesophageal reflux disease)     HBP (high blood pressure)     Heart failure (HCC)     Hx of blood clots     Hypertension     Hypokalemia     Hypothyroid     Obesity     Psychiatric disorder      Past Surgical History:   Procedure Laterality Date    ABDOMINAL HERNIA REPAIR  05/2019    CHOLECYSTECTOMY      Lap    GASTRECTOMY SLEEVE LAPAROSCOPIC  08/2018    INCISION AND DRAINAGE OF WOUND Bilateral 12/01/2021    Procedure: INCISION AND DRAINAGE (I&D) HEAD/FACE;  Surgeon: Aurelio Bernardo MD;  Location:  MAIN OR;  Service: General    IR PICC PLACEMENT DOUBLE LUMEN  06/13/2022    IVC FILTER INSERTION  2018    KNEE SURGERY Right     open wound, injury     LAPAROTOMY N/A 6/14/2022    Procedure: LAPAROTOMY EXPLORATORY, EXTENSIVE LYSIS OF ADHESIONS, APPLICATION OF ABTHERA WOUND VAC;  Surgeon: Luis Enrique Rodriguez MD;  Location: [address] [place] [address];  Service: General    LAPAROTOMY N/A 6/15/2022    Procedure: LAPAROTOMY EXPLORATORY WITH REPAIR OF SEROSAL INJURY;  Surgeon: Rosy Mariano MD;  Location:  MAIN OR;  Service: General    LEG SURGERY Right 2009    US GUIDED VASCULAR ACCESS  08/06/2018    VAC DRESSING APPLICATION N/A 1/99/8492    Procedure: CHANGE DRESSING/VAC ABDOMEN;  Surgeon: Rosy Mariano MD;  Location: [facility];  Service: General    VAC DRESSING APPLICATION N/A 6/59/1419    Procedure: ABDOMINAL WASHOUT, REPAIR OF SEROSAL TEARS,BRIDING VICRYL MESH, PARTIAL CLOSURE, APPLICATION OF WOUND VAC;  Surgeon: Maddi Montalvo DO;  Location: [facility];  Service: General     Social History   Social History     Substance and Sexual Activity   Alcohol Use Not Currently     Social History     Substance and Sexual Activity   Drug Use No     Social History     Tobacco Use   Smoking Status Former Smoker    Packs/day: 1 00    Years: 15 00    Pack years: 15 00   Smokeless Tobacco Never Used   Tobacco Comment    1 5ppd x 23 years, quit 2014     Family History:   Family History   Problem Relation Age of Onset    Lung cancer Father     Diabetes Maternal Aunt     Heart attack Mother     Clotting disorder Mother     Hypertension Mother     Heart failure Mother     Diabetes Mother     Atrial fibrillation Mother     Sleep apnea Mother     Obesity Mother     Asthma Sister     Stroke Neg Hx     Anuerysm Neg Hx     Arrhythmia Neg Hx     Hyperlipidemia Neg Hx         pt unsure    Fainting Neg Hx        Review of Systems:  Review of Systems        Scheduled Meds:  Current Facility-Administered Medications   Medication Dose Route Frequency Provider Last Rate    acetaminophen  650 mg Oral Q6H Ash Patino PA-C      Adult TPN (CUSTOM BASE/CUSTOM ELECTROLYTE)   Intravenous Continuous TPN Babita Dorsey MD 75 1 mL/hr at 06/27/22 2146    bisacodyl  10 mg Rectal Daily Sandra Cordoba PA-C      budesonide-formoterol  2 puff Inhalation BID Ash Patino PA-C      digoxin  125 mcg Intravenous Daily Pushpa DO Scarlett      gabapentin  300 mg Oral TID Ash Patino PA-C      heparin (porcine)  3-30 Units/kg/hr (Order-Specific) Intravenous Titrated Ash Patino PA-C Stopped (06/28/22 0707)    heparin (porcine)  10,000 Units Intravenous Q1H PRN Ash Patino PA-C      heparin (porcine)  5,000 Units Intravenous Q1H PRN Ash Patino PA-C      HYDROmorphone  1 mg Intravenous Q4H PRN Sandra Cordoba PA-C      insulin lispro  1-5 Units Subcutaneous Q6H [address] Quinn Cordoba, JIMENA      ipratropium  0 5 mg Nebulization TID Ricardo Bun, PA-JAMES      levalbuterol  1 25 mg Nebulization TID Ricardo Bun, PA-JAMES      levothyroxine  25 mcg Oral Early Morning Ricardo Bun, PA-C      methocarbamol  500 mg Oral Q6H PRN Ricardo Bun, PA-C      metoclopramide  10 mg Intravenous Q6H PRN Carolynn Arceo MD      metoprolol  2 5 mg Intravenous Q6H [place],       omeprazole (PRILOSEC) suspension 2 mg/mL  20 mg Oral Early Morning Ricardo Bun, PA-JAMES      ondansetron  4 mg Intravenous Q4H PRN Dandre De Dios MD      oxyCODONE  10 mg Oral Q4H PRN Ricardo Bun, PA-JAMES      oxyCODONE  5 mg Oral Q4H PRN Ricardo Bun, PA-C      polyethylene glycol  17 g Oral Daily Ricardo Bun, PA-C      QUEtiapine  50 mg Oral HS Ricardo Bun, PA-JAMES      senna-docusate sodium  1 tablet Oral BID Ricardo Bun, PA-JAMES      spironolactone  50 mg Per NG Tube Daily Hellen R Beryl, PA-JAMES      torsemide  40 mg Oral BID Quinn Cordoba PA-C       Continuous Infusions:Adult TPN (CUSTOM BASE/CUSTOM ELECTROLYTE), , Last Rate: 75 1 mL/hr at 22 2146  heparin (porcine), 3-30 Units/kg/hr (Order-Specific), Last Rate: Stopped (22 0707)      PRN Meds:   heparin (porcine)    heparin (porcine)    HYDROmorphone    methocarbamol    metoclopramide    ondansetron    oxyCODONE    oxyCODONE  all current active meds have been reviewed    Allergies   Allergen Reactions    Penicillins Hives       Objective   Vitals: Temp (24hrs), Av °F (37 2 °C), Min:98 1 °F (36 7 °C), Max:99 9 °F (37 7 °C)  Current: Temperature: 98 1 °F (36 7 °C)  Patient Vitals for the past 24 hrs:   BP Temp Pulse Resp SpO2   22 0754 -- 98 1 °F (36 7 °C) 98 -- 98 %   22 0638 121/67 -- 94 20 100 %   22 0210 124/68 99 9 °F (37 7 °C) 97 -- 95 %   22 0206 124/68 99 9 °F (37 7 °C) 75 -- 96 %   22 2205 124/82 98 6 °F (37 °C) 101 -- 95 %   06/27/22 1915 121/70 98 4 °F (36 9 °C) 104 18 95 %   06/27/22 1524 118/71 -- 98 -- 94 %   06/27/22 1508 93/63 99 1 °F (37 3 °C) 81 18 94 %   06/27/22 1127 100/67 -- 99 -- 94 %   06/27/22 1038 99/72 98 8 °F (37 1 °C) 102 -- 96 %   06/27/22 0856 107/71 -- 98 -- 95 %    Body mass index is 70 6 kg/m²    Orthostatic Blood Pressures    Flowsheet Row Most Recent Value   Blood Pressure 121/67 filed at 06/28/2022 0230   Patient Position - Orthostatic VS Lying filed at 06/27/2022 0714              Invasive Devices  Report    Peripherally Inserted Central Catheter Line  Duration           PICC Line 43/67/12 Right Basilic 14 days          Drain  Duration           External Urinary Catheter 6 days    NG/OG/Enteral Tube Nasogastric 18 Fr Right nare 4 days                Physical Exam:  Physical Exam        Lab Results:   Results from last 7 days   Lab Units 06/28/22  0542 06/27/22  0501 06/26/22  4438 06/25/22  0525 06/25/22  0000 06/24/22  0538 06/23/22  0543 06/22/22  0623   WBC Thousand/uL 9 38 11 23* 8 77 8 59 7 96 8 43 9 31 10 28*   HEMOGLOBIN g/dL 9 2* 8 7* 8 6* 8 9* 8 5* 9 1* 8 8* 9 5*   HEMATOCRIT % 29 3* 28 4* 29 0* 29 6* 28 7* 29 0* 28 9* 31 1*   PLATELETS Thousands/uL 259 239 248 271 260 270 239 210   MONO PCT %  --   --   --   --   --  2*  --   --       Results from last 7 days   Lab Units 06/28/22  0542 06/27/22  0501 06/26/22  0653 06/26/22  0106 06/25/22  1822 06/25/22  1126 06/25/22  0525 06/24/22  2115 06/24/22  1334 06/24/22  0538 06/23/22  0543 06/22/22  0623   SODIUM mmol/L 134* 135* 135*  --   --   --  136  --   --  139 140 137   POTASSIUM mmol/L 3 9 4 3 4 2  --   --   --  4 2  --   --  4 4 3 9 3 7   CHLORIDE mmol/L 93* 92* 93*  --   --   --  98*  --   --  99* 98* 98*   CO2 mmol/L 39* 35* 38*  --   --   --  34*  --   --  35* 35* 36*   BUN mg/dL 17 18 17  --   --   --  17  --   --  18 19 20   CREATININE mg/dL 0 70 0 68 0 64  --   --   --  0 50*  --   --  0 47* 0 49* 0 53*   CALCIUM mg/dL 8 9 8 7 8 9 --   --   --  9 0  --   --  9 0 9 2 8 9   MAGNESIUM mg/dL 2 3 2 2 2 2  --   --   --  2 4  --   --   --  2 5 2 2   PHOSPHORUS mg/dL 3 3 4 6* 5 0*  --   --   --  4 3  --   --   --  3 6 4 6*   PTT seconds 156* 88* 75* 77* 95* 53* 61* 59* 139* 47* 88* 64*   EGFR ml/min/1 73sq m 109 110 113  --   --   --  125  --   --  128 126 122     Results from last 7 days   Lab Units 06/28/22  0542 06/27/22  0501 06/26/22  0653 06/26/22  0106 06/25/22  1822 06/25/22  1126 06/25/22  0525 06/24/22  2115 06/24/22  1334 06/24/22  0538 06/23/22  0543 06/22/22  0623   PTT seconds 156* 88* 75* 77* 95* 53* 61* 59* 139* 47* 88* 64*             No results found for: PHART, WXO5WJG, PO2ART, LVX5GED, V6NZMDOH, BEART, SOURCE  No components found for: HIV1X2  No results found for: HAV, HEPAIGM, HEPBIGM, HEPBCAB, HBEAG, HEPCAB  No results found for: SPEP, UPEP   Lab Results   Component Value Date    HGBA1C 5 6 06/07/2022    HGBA1C 5 4 03/21/2022    HGBA1C 5 5 12/22/2021     No results found for: CHOL   Lab Results   Component Value Date    HDL 44 08/16/2016      Lab Results   Component Value Date    LDLCALC 80 08/16/2016      Lab Results   Component Value Date    TRIG 124 06/22/2022    TRIG 556 (H) 06/19/2022    TRIG 172 (H) 06/13/2022     No components found for: PROCAL          Imaging: I have personally reviewed pertinent reports

## 2022-06-28 NOTE — OCCUPATIONAL THERAPY NOTE
OCCUPATIONAL THERAPY SCREEN:     ORDERS RECEIVED  CHART REVIEW COMPLETED  PER CM, PT IS A LONG TERM RESIDENT OF [facility] WHERE HE REQUIRES TOTAL ASSIST WITH ADLS AND IS PRIMARILY BED-BOUND AND UTILIZES MECHANICAL LIFT FOR TXFS  PLAN TO RETURN TO PCF WHEN MEDICALLY CLEARED  REQUEST MADE BY SURGICAL PA FOR BED LEVEL EXERCISES- PLAN FOR RESTORATIVE THERAPY TO ASSIST WITH THIS   NO ACUTE CARE OT NEEDS AT THIS TIME  D/C OT       Alex Hilton, MOT, OTR/L

## 2022-06-28 NOTE — PHYSICAL THERAPY NOTE
Physical Therapy Screen    Patient's Name: Ruddy Guo       06/28/22 1533   PT Last Visit   PT Visit Date 06/28/22   Note Type   Note type Screen   Cancel Reasons Other   Additional Comments Pt LTC resident at [facility], frankie lift at baseline, no acute PT needs  Discussed with surgical PA, requesting bed level exercise  Communicated with restorative therapy who will add to caseload  Will complete PT order  Thank you         Consuelo Ramirez, PT, DPT, GCS

## 2022-06-28 NOTE — PROGRESS NOTES
Progress Note - General Surgery   [facility] 47 y o  male MRN: [id number]  Unit/Bed#: [facility] 607-01 Encounter: [id number]    Assessment:  48yoM p/w SBO 2/2 chronic incarcerated ventral hernia now s/p   6/14  ex lap, HARVEY 6/14, also s/p abdominal washout, repair of serosal tears,  6/15 washout,abhera, VAC DPR  6/17 bridging vicryl mesh placement, wound vac placement      Vital stable, afebrile, 2L NC  Labs pending     UOP 3400    Abdominal VAC 50    Plan:  - NPO/NGT  - PICC/TPN  - abdominal VAC, monitor output and character, changes MWF  - cardiology on board, appreciate recommendations, currently on heparin drip  - work with PT  - pain control  - incentive spirometry  - BiPAP HS    Subjective/Objective   Subjective:   Denies pain this morning, remains NPO with NGT in place, denies nausea or emesis, passing flatus but has not had a bowel movement yesterday, limited out of bed  Objective:     Blood pressure 124/68, pulse 97, temperature 99 9 °F (37 7 °C), resp  rate 18, height 5' 11" (1 803 m), weight (!) 230 kg (506 lb 2 8 oz), SpO2 95 %  ,Body mass index is 70 6 kg/m²  Intake/Output Summary (Last 24 hours) at 6/28/2022 0546  Last data filed at 6/28/2022 0211  Gross per 24 hour   Intake 3094 99 ml   Output 3980 ml   Net -885 01 ml       Invasive Devices  Report    Peripherally Inserted Central Catheter Line  Duration           PICC Line 95/84/93 Right Basilic 14 days          Drain  Duration           External Urinary Catheter 6 days    NG/OG/Enteral Tube Nasogastric 18 Fr Right nare 4 days                Physical Exam:   General: NAD  Skin: Warm, dry, anicteric  HEENT: Normocephalic, atraumatic  CV: RRR, no m/r/g  Pulm: CTA b/l, no inc WOB, 2L NC  Abd: Soft, ND/NT, VAC in place draining serosanguineous output  MSK: Symmetric, no edema, no tenderness, no deformity  Neuro: AOx3, GCS 15    Lab, Imaging and other studies:I have personally reviewed pertinent lab results    , CBC: No results found for: WBC, HGB, HCT, MCV, PLT, ADJUSTEDWBC, MCH, MCHC, RDW, MPV, NRBC, CMP: No results found for: SODIUM, K, CL, CO2, ANIONGAP, BUN, CREATININE, GLUCOSE, CALCIUM, AST, ALT, ALKPHOS, PROT, BILITOT, EGFR  VTE Pharmacologic Prophylaxis: Sequential compression device (Venodyne)  and Heparin  VTE Mechanical Prophylaxis: sequential compression device

## 2022-06-28 NOTE — PROGRESS NOTES
Patient's BP is 93/57  Patient is asymptomatic  Hiral Burgos of General Surgery notified  Holding diuretics and metoprolol per hold parameters, but okay to give digitoxin per Cardiology  Instructed to inform General Surgery if patient's SBP is less than the 90's

## 2022-06-28 NOTE — QUICK NOTE
Nurse-Patient-Provider rounds were completed with the patient's nurse today, Sabrina Leggett  We discussed the plan is to remove NG tube, start sips of clear liquids, continue TPN for 24-48 more hours as patient tolerates more p o  Intake without signs of aspiration/nausea or vomiting  Plan for OR on 06/30/2022 for split-thickness skin graft, abdominal closure  Encourage upper extremity activity with restorative therapy  We reviewed all of the invasive devices/lines/telemetry orders   -R PICC    DVT Prophylaxis:  Hep Gtt    Pain Assessment / Plan:  - Continue current analgesic regimen  Mobility Assessment / Plan:  - Activity as tolerated  Goals / Barriers for discharge:  -Barriers to d/c include further surgical intervention including split-thickness skin graft with possible abdominal closure, slow advancement of diet  Case management following; case and discharge needs discussed  All questions and concerns were addressed  I spent greater than 26 minutes reviewing the plan with the patient and the nurse, and coordinating care for the day      Aden Coats PA-C  6/28/2022

## 2022-06-29 ENCOUNTER — APPOINTMENT (INPATIENT)
Dept: RADIOLOGY | Facility: HOSPITAL | Age: 52
DRG: 230 | End: 2022-06-29
Payer: COMMERCIAL

## 2022-06-29 ENCOUNTER — ANESTHESIA EVENT (INPATIENT)
Dept: PERIOP | Facility: HOSPITAL | Age: 52
DRG: 230 | End: 2022-06-29
Payer: COMMERCIAL

## 2022-06-29 LAB
ANION GAP SERPL CALCULATED.3IONS-SCNC: 4 MMOL/L (ref 4–13)
APTT PPP: 52 SECONDS (ref 23–37)
APTT PPP: 93 SECONDS (ref 23–37)
BASOPHILS # BLD MANUAL: 0 THOUSAND/UL (ref 0–0.1)
BASOPHILS NFR MAR MANUAL: 0 % (ref 0–1)
BUN SERPL-MCNC: 17 MG/DL (ref 5–25)
CALCIUM SERPL-MCNC: 8.8 MG/DL (ref 8.3–10.1)
CHLORIDE SERPL-SCNC: 91 MMOL/L (ref 100–108)
CO2 SERPL-SCNC: 39 MMOL/L (ref 21–32)
CREAT SERPL-MCNC: 0.73 MG/DL (ref 0.6–1.3)
EOSINOPHIL # BLD MANUAL: 0 THOUSAND/UL (ref 0–0.4)
EOSINOPHIL NFR BLD MANUAL: 0 % (ref 0–6)
ERYTHROCYTE [DISTWIDTH] IN BLOOD BY AUTOMATED COUNT: 15.5 % (ref 11.6–15.1)
GFR SERPL CREATININE-BSD FRML MDRD: 107 ML/MIN/1.73SQ M
GLUCOSE SERPL-MCNC: 131 MG/DL (ref 65–140)
GLUCOSE SERPL-MCNC: 139 MG/DL (ref 65–140)
GLUCOSE SERPL-MCNC: 142 MG/DL (ref 65–140)
GLUCOSE SERPL-MCNC: 145 MG/DL (ref 65–140)
GLUCOSE SERPL-MCNC: 151 MG/DL (ref 65–140)
GLUCOSE SERPL-MCNC: 157 MG/DL (ref 65–140)
GLUCOSE SERPL-MCNC: 163 MG/DL (ref 65–140)
GLUCOSE SERPL-MCNC: 168 MG/DL (ref 65–140)
HCT VFR BLD AUTO: 28.2 % (ref 36.5–49.3)
HGB BLD-MCNC: 8.7 G/DL (ref 12–17)
LYMPHOCYTES # BLD AUTO: 1.07 THOUSAND/UL (ref 0.6–4.47)
LYMPHOCYTES # BLD AUTO: 12 % (ref 14–44)
MCH RBC QN AUTO: 28.8 PG (ref 26.8–34.3)
MCHC RBC AUTO-ENTMCNC: 30.9 G/DL (ref 31.4–37.4)
MCV RBC AUTO: 93 FL (ref 82–98)
METAMYELOCYTES NFR BLD MANUAL: 1 % (ref 0–1)
MONOCYTES # BLD AUTO: 0.63 THOUSAND/UL (ref 0–1.22)
MONOCYTES NFR BLD: 7 % (ref 4–12)
NEUTROPHILS # BLD MANUAL: 7.16 THOUSAND/UL (ref 1.85–7.62)
NEUTS BAND NFR BLD MANUAL: 2 % (ref 0–8)
NEUTS SEG NFR BLD AUTO: 78 % (ref 43–75)
PLATELET # BLD AUTO: 249 THOUSANDS/UL (ref 149–390)
PLATELET BLD QL SMEAR: ADEQUATE
PMV BLD AUTO: 11.2 FL (ref 8.9–12.7)
POLYCHROMASIA BLD QL SMEAR: PRESENT
POTASSIUM SERPL-SCNC: 4 MMOL/L (ref 3.5–5.3)
RBC # BLD AUTO: 3.02 MILLION/UL (ref 3.88–5.62)
RBC MORPH BLD: PRESENT
SODIUM SERPL-SCNC: 134 MMOL/L (ref 136–145)
WBC # BLD AUTO: 8.95 THOUSAND/UL (ref 4.31–10.16)

## 2022-06-29 PROCEDURE — 80048 BASIC METABOLIC PNL TOTAL CA: CPT | Performed by: PHYSICIAN ASSISTANT

## 2022-06-29 PROCEDURE — 74018 RADEX ABDOMEN 1 VIEW: CPT

## 2022-06-29 PROCEDURE — 82948 REAGENT STRIP/BLOOD GLUCOSE: CPT

## 2022-06-29 PROCEDURE — 99024 POSTOP FOLLOW-UP VISIT: CPT | Performed by: SURGERY

## 2022-06-29 PROCEDURE — 94640 AIRWAY INHALATION TREATMENT: CPT

## 2022-06-29 PROCEDURE — 99232 SBSQ HOSP IP/OBS MODERATE 35: CPT | Performed by: INTERNAL MEDICINE

## 2022-06-29 PROCEDURE — 94660 CPAP INITIATION&MGMT: CPT

## 2022-06-29 PROCEDURE — 85730 THROMBOPLASTIN TIME PARTIAL: CPT | Performed by: SURGERY

## 2022-06-29 PROCEDURE — 85027 COMPLETE CBC AUTOMATED: CPT | Performed by: PHYSICIAN ASSISTANT

## 2022-06-29 PROCEDURE — 94760 N-INVAS EAR/PLS OXIMETRY 1: CPT

## 2022-06-29 PROCEDURE — 85007 BL SMEAR W/DIFF WBC COUNT: CPT | Performed by: PHYSICIAN ASSISTANT

## 2022-06-29 RX ORDER — CEFAZOLIN SODIUM 2 G/50ML
2000 SOLUTION INTRAVENOUS
Status: COMPLETED | OUTPATIENT
Start: 2022-06-30 | End: 2022-06-30

## 2022-06-29 RX ORDER — LIDOCAINE HYDROCHLORIDE 20 MG/ML
1 JELLY TOPICAL ONCE
Status: DISCONTINUED | OUTPATIENT
Start: 2022-06-29 | End: 2022-07-19 | Stop reason: HOSPADM

## 2022-06-29 RX ORDER — METOPROLOL TARTRATE 5 MG/5ML
2.5 INJECTION INTRAVENOUS EVERY 6 HOURS
Status: DISCONTINUED | OUTPATIENT
Start: 2022-06-29 | End: 2022-07-03

## 2022-06-29 RX ORDER — DIGOXIN 0.25 MG/ML
125 INJECTION INTRAMUSCULAR; INTRAVENOUS DAILY
Status: DISCONTINUED | OUTPATIENT
Start: 2022-06-29 | End: 2022-07-03

## 2022-06-29 RX ADMIN — INSULIN LISPRO 1 UNITS: 100 INJECTION, SOLUTION INTRAVENOUS; SUBCUTANEOUS at 11:44

## 2022-06-29 RX ADMIN — BUDESONIDE AND FORMOTEROL FUMARATE DIHYDRATE 2 PUFF: 160; 4.5 AEROSOL RESPIRATORY (INHALATION) at 21:47

## 2022-06-29 RX ADMIN — INSULIN LISPRO 1 UNITS: 100 INJECTION, SOLUTION INTRAVENOUS; SUBCUTANEOUS at 00:43

## 2022-06-29 RX ADMIN — METOROPROLOL TARTRATE 2.5 MG: 5 INJECTION, SOLUTION INTRAVENOUS at 17:07

## 2022-06-29 RX ADMIN — BUDESONIDE AND FORMOTEROL FUMARATE DIHYDRATE 2 PUFF: 160; 4.5 AEROSOL RESPIRATORY (INHALATION) at 11:44

## 2022-06-29 RX ADMIN — LEVALBUTEROL HYDROCHLORIDE 1.25 MG: 1.25 SOLUTION, CONCENTRATE RESPIRATORY (INHALATION) at 15:17

## 2022-06-29 RX ADMIN — IPRATROPIUM BROMIDE 0.5 MG: 0.5 SOLUTION RESPIRATORY (INHALATION) at 08:17

## 2022-06-29 RX ADMIN — Medication 20 MG: at 05:48

## 2022-06-29 RX ADMIN — Medication: at 21:41

## 2022-06-29 RX ADMIN — METOROPROLOL TARTRATE 2.5 MG: 5 INJECTION, SOLUTION INTRAVENOUS at 11:38

## 2022-06-29 RX ADMIN — METOCLOPRAMIDE 10 MG: 5 INJECTION, SOLUTION INTRAMUSCULAR; INTRAVENOUS at 10:12

## 2022-06-29 RX ADMIN — ACETAMINOPHEN 650 MG: 650 SUSPENSION ORAL at 05:48

## 2022-06-29 RX ADMIN — ACETAMINOPHEN 650 MG: 650 SUSPENSION ORAL at 22:19

## 2022-06-29 RX ADMIN — OXYCODONE HYDROCHLORIDE 5 MG: 5 SOLUTION ORAL at 21:47

## 2022-06-29 RX ADMIN — IPRATROPIUM BROMIDE 0.5 MG: 0.5 SOLUTION RESPIRATORY (INHALATION) at 15:17

## 2022-06-29 RX ADMIN — HEPARIN SODIUM 22 UNITS/KG/HR: 10000 INJECTION, SOLUTION INTRAVENOUS at 14:47

## 2022-06-29 RX ADMIN — GABAPENTIN 300 MG: 250 SOLUTION ORAL at 21:48

## 2022-06-29 RX ADMIN — METOROPROLOL TARTRATE 2.5 MG: 5 INJECTION, SOLUTION INTRAVENOUS at 22:25

## 2022-06-29 RX ADMIN — DIGOXIN 125 MCG: 0.25 INJECTION INTRAMUSCULAR; INTRAVENOUS at 11:50

## 2022-06-29 RX ADMIN — LEVALBUTEROL HYDROCHLORIDE 1.25 MG: 1.25 SOLUTION, CONCENTRATE RESPIRATORY (INHALATION) at 08:17

## 2022-06-29 RX ADMIN — LEVOTHYROXINE SODIUM 25 MCG: 25 TABLET ORAL at 05:48

## 2022-06-29 RX ADMIN — ONDANSETRON 4 MG: 2 INJECTION INTRAMUSCULAR; INTRAVENOUS at 09:40

## 2022-06-29 RX ADMIN — LEVALBUTEROL HYDROCHLORIDE 1.25 MG: 1.25 SOLUTION, CONCENTRATE RESPIRATORY (INHALATION) at 21:22

## 2022-06-29 RX ADMIN — IOHEXOL 30 ML: 240 INJECTION, SOLUTION INTRATHECAL; INTRAVASCULAR; INTRAVENOUS; ORAL at 15:48

## 2022-06-29 RX ADMIN — HEPARIN SODIUM 24 UNITS/KG/HR: 10000 INJECTION, SOLUTION INTRAVENOUS at 04:45

## 2022-06-29 RX ADMIN — HEPARIN SODIUM 24 UNITS/KG/HR: 10000 INJECTION, SOLUTION INTRAVENOUS at 22:24

## 2022-06-29 RX ADMIN — IPRATROPIUM BROMIDE 0.5 MG: 0.5 SOLUTION RESPIRATORY (INHALATION) at 21:22

## 2022-06-29 RX ADMIN — TORSEMIDE 40 MG: 20 TABLET ORAL at 21:41

## 2022-06-29 RX ADMIN — HEPARIN SODIUM 5000 UNITS: 1000 INJECTION INTRAVENOUS; SUBCUTANEOUS at 17:15

## 2022-06-29 NOTE — PROGRESS NOTES
Progress Note - General Surgery   Cathi Garza 47 y o  male MRN: [id number]  Unit/Bed#: [facility] 607-01 Encounter: [id number]    Assessment:  48yoM p/w SBO 2/2 chronic incarcerated ventral hernia now s/p   6/14  ex lap, HARVEY 6/14, also s/p abdominal washout, repair of serosal tears,  6/15 washout,abhera, VAC DPR  6/17 bridging vicryl mesh placement, wound vac placement    6/29 KUB: Contrast in colon, official read pending    Vitals normal on 2L NC  UOP 715cc + 2 unmeasured occurrences  VAC 50cc serosanguinous    WBC pending (from 8 95)  Hb pending (from 8 7)  Cr pending (from 0 73)    Plan:  OR for [facility] today 6/30  NPO/NGT  Continue TPN  Maintain abdominal VAC, monitor output  Appreciate cardiology recommendations, continue digoxin and metoprolol, continue heparin drip, heparin on hold for OR  BiPAP HS  PRN analgesia  PT/OT    Subjective/Objective     Subjective: No acute events overnight, NPO with NGT, denies flatus or BM, pain controlled, vac holding suction    Objective:    Blood pressure 107/71, pulse 101, temperature 99 2 °F (37 3 °C), resp  rate 18, height 5' 11" (1 803 m), weight (!) 230 kg (506 lb 2 8 oz), SpO2 99 %  ,Body mass index is 70 6 kg/m²        Intake/Output Summary (Last 24 hours) at 6/30/2022 0971  Last data filed at 6/30/2022 0110  Gross per 24 hour   Intake 3039 33 ml   Output 2455 ml   Net 584 33 ml       Invasive Devices  Report    Peripherally Inserted Central Catheter Line  Duration           PICC Line 02/87/60 Right Basilic 16 days          Drain  Duration           NG/OG/Enteral Tube Nasogastric Right nare <1 day                Physical Exam:  Gen:    NAD  CV:      warm, well-perfused  Lungs: No respiratory distress  Abd:     soft, NT/ND, VAC with serosanguinous output  Ext:      no CCE  Neuro: A&Ox3

## 2022-06-29 NOTE — PLAN OF CARE
Problem: Prexisting or High Potential for Compromised Skin Integrity  Goal: Skin integrity is maintained or improved  Description: INTERVENTIONS:  - Identify patients at risk for skin breakdown  - Assess and monitor skin integrity  - Assess and monitor nutrition and hydration status  - Monitor labs   - Assess for incontinence   - Turn and reposition patient  - Assist with mobility/ambulation  - Relieve pressure over bony prominences  - Avoid friction and shearing  - Provide appropriate hygiene as needed including keeping skin clean and dry  - Evaluate need for skin moisturizer/barrier cream  - Collaborate with interdisciplinary team   - Patient/family teaching  - Consider wound care consult   Outcome: Progressing     Problem: Potential for Falls  Goal: Patient will remain free of falls  Description: INTERVENTIONS:  - Educate patient/family on patient safety including physical limitations  - Instruct patient to call for assistance with activity   - Consult OT/PT to assist with strengthening/mobility   - Keep Call bell within reach  - Keep bed low and locked with side rails adjusted as appropriate  - Keep care items and personal belongings within reach  - Initiate and maintain comfort rounds  - Make Fall Risk Sign visible to staff  - Offer Toileting every 1 Hours, in advance of need  - Initiate/Maintain alarm  - Obtain necessary fall risk management equipment  - Apply yellow socks and bracelet for high fall risk patients  - Consider moving patient to room near nurses station  Outcome: Progressing     Problem: MOBILITY - ADULT  Goal: Maintain or return to baseline ADL function  Description: INTERVENTIONS:  -  Assess patient's ability to carry out ADLs; assess patient's baseline for ADL function and identify physical deficits which impact ability to perform ADLs (bathing, care of mouth/teeth, toileting, grooming, dressing, etc )  - Assess/evaluate cause of self-care deficits   - Assess range of motion  - Assess patient's mobility; develop plan if impaired  - Assess patient's need for assistive devices and provide as appropriate  - Encourage maximum independence but intervene and supervise when necessary  - Involve family in performance of ADLs  - Assess for home care needs following discharge   - Consider OT consult to assist with ADL evaluation and planning for discharge  - Provide patient education as appropriate  Outcome: Progressing  Goal: Maintains/Returns to pre admission functional level  Description: INTERVENTIONS:  - Perform BMAT or MOVE assessment daily    - Set and communicate daily mobility goal to care team and patient/family/caregiver  - Collaborate with rehabilitation services on mobility goals if consulted  - Perform Range of Motion 3 times a day  - Reposition patient every 2 hours  - Dangle patient 3 times a day  - Stand patient 3 times a day  - Ambulate patient 3 times a day  - Out of bed to chair 3 times a day   - Out of bed for meals 3 times a day  - Out of bed for toileting  - Record patient progress and toleration of activity level   Outcome: Progressing     Problem: Nutrition/Hydration-ADULT  Goal: Nutrient/Hydration intake appropriate for improving, restoring or maintaining nutritional needs  Description: Monitor and assess patient's nutrition/hydration status for malnutrition  Collaborate with interdisciplinary team and initiate plan and interventions as ordered  Monitor patient's weight and dietary intake as ordered or per policy  Utilize nutrition screening tool and intervene as necessary  Determine patient's food preferences and provide high-protein, high-caloric foods as appropriate       INTERVENTIONS:  - Monitor oral intake, urinary output, labs, and treatment plans  - Assess nutrition and hydration status and recommend course of action  - Evaluate amount of meals eaten  - Assist patient with eating if necessary   - Allow adequate time for meals  - Recommend/ encourage appropriate diets, oral nutritional supplements, and vitamin/mineral supplements  - Order, calculate, and assess calorie counts as needed  - Recommend, monitor, and adjust tube feedings and TPN/PPN based on assessed needs  - Assess need for intravenous fluids  - Provide specific nutrition/hydration education as appropriate  - Include patient/family/caregiver in decisions related to nutrition  Outcome: Progressing     Problem: PAIN - ADULT  Goal: Verbalizes/displays adequate comfort level or baseline comfort level  Description: Interventions:  - Encourage patient to monitor pain and request assistance  - Assess pain using appropriate pain scale  - Administer analgesics based on type and severity of pain and evaluate response  - Implement non-pharmacological measures as appropriate and evaluate response  - Consider cultural and social influences on pain and pain management  - Notify physician/advanced practitioner if interventions unsuccessful or patient reports new pain  Outcome: Progressing     Problem: INFECTION - ADULT  Goal: Absence or prevention of progression during hospitalization  Description: INTERVENTIONS:  - Assess and monitor for signs and symptoms of infection  - Monitor lab/diagnostic results  - Monitor all insertion sites, i e  indwelling lines, tubes, and drains  - Monitor endotracheal if appropriate and nasal secretions for changes in amount and color  - [place] appropriate cooling/warming therapies per order  - Administer medications as ordered  - Instruct and encourage patient and family to use good hand hygiene technique  - Identify and instruct in appropriate isolation precautions for identified infection/condition  Outcome: Progressing     Problem: DISCHARGE PLANNING  Goal: Discharge to home or other facility with appropriate resources  Description: INTERVENTIONS:  - Identify barriers to discharge w/patient and caregiver  - Arrange for needed discharge resources and transportation as appropriate  - Identify discharge learning needs (meds, wound care, etc )  - Arrange for interpretive services to assist at discharge as needed  - Refer to Case Management Department for coordinating discharge planning if the patient needs post-hospital services based on physician/advanced practitioner order or complex needs related to functional status, cognitive ability, or social support system  Outcome: Progressing     Problem: Knowledge Deficit  Goal: Patient/family/caregiver demonstrates understanding of disease process, treatment plan, medications, and discharge instructions  Description: Complete learning assessment and assess knowledge base    Interventions:  - Provide teaching at level of understanding  - Provide teaching via preferred learning methods  Outcome: Progressing

## 2022-06-29 NOTE — PROGRESS NOTES
Progress Note - General Surgery   Lenin Giraldo 47 y o  male MRN: [id number]  Unit/Bed#: [facility] 607-01 Encounter: [id number]    Assessment:  48yoM p/w SBO 2/2 chronic incarcerated ventral hernia now s/p   6/14  ex lap, HARVEY 6/14, also s/p abdominal washout, repair of serosal tears,  6/15 washout,abhera, VAC DPR  6/17 bridging vicryl mesh placement, wound vac placement      Tachy to 120 once, afebrile, 5L NC  WBC 8 95 from 9 4, Hb 8 7 from 9 4  BMP pending     UOP 1700  Abdominal     Plan:  - NPO, sips  - PICC/TPN  - abdominal VAC, monitor output and character, changes MWF  - cardiology on board, appreciate recommendations, currently on heparin drip  - work with PT  - pain control  - incentive spirometry  - BiPAP HS    Subjective/Objective   Subjective:   Doing well this AM, no abdominal pain, tolerating sips w some minimal nausea no emesis, passing flatus but no BM yesterday, voiding  Objective:     Blood pressure 117/76, pulse 82, temperature 99 1 °F (37 3 °C), resp  rate (!) 26, height 5' 11" (1 803 m), weight (!) 230 kg (506 lb 2 8 oz), SpO2 96 %  ,Body mass index is 70 6 kg/m²  Intake/Output Summary (Last 24 hours) at 6/29/2022 9342  Last data filed at 6/29/2022 0301  Gross per 24 hour   Intake 2334 28 ml   Output 2250 ml   Net 84 28 ml       Invasive Devices  Report    Peripherally Inserted Central Catheter Line  Duration           PICC Line 61/87/35 Right Basilic 15 days          Drain  Duration           External Urinary Catheter 7 days                Physical Exam:  General: NAD  Skin: Warm, dry, anicteric  HEENT: Normocephalic, atraumatic  CV: RRR, no m/r/g  Pulm: CTA b/l, no inc WOB  Abd: Soft, ND/NT, VAC ss  MSK: Symmetric, no edema, no tenderness, no deformity  Neuro: AOx3, GCS 15    Lab, Imaging and other studies:  I have personally reviewed pertinent lab results    , CBC:   Lab Results   Component Value Date    WBC 8 95 06/29/2022    HGB 8 7 (L) 06/29/2022    HCT 28 2 (L) 06/29/2022    MCV 93 06/29/2022     06/29/2022    MCH 28 8 06/29/2022    MCHC 30 9 (L) 06/29/2022    RDW 15 5 (H) 06/29/2022    MPV 11 2 06/29/2022   , CMP: No results found for: SODIUM, K, CL, CO2, ANIONGAP, BUN, CREATININE, GLUCOSE, CALCIUM, AST, ALT, ALKPHOS, PROT, BILITOT, EGFR  VTE Pharmacologic Prophylaxis: Sequential compression device (Venodyne)  and Heparin  VTE Mechanical Prophylaxis: sequential compression device

## 2022-06-29 NOTE — DISCHARGE INSTR - OTHER ORDERS
Skin care plans:  1-Hydraguard to bilateral sacrum, buttock and heels BID and PRN  2-Elevate heels to offload pressure  3-Ehob cushion in chair when out of bed  4-Moisturize skin daily with skin nourishing cream   5-Turn/reposition q2h or when medically stable for pressure re-distribution on skin  6-Irrigate wound to posterior neck with normal saline, apply silver alginate, cover with Allevyn foam  Change every other day and PRN soilage/displacement  7-Cleanse wound with NS and pat dry  Place xeroform over wound bed and cover with ABD  Secure with Tape  Change Daily or PRN soilage or displacement

## 2022-06-29 NOTE — PROGRESS NOTES
Reason for Consult / Principal Problem:Afib with RVR    Physician Requesting Consult:  Warden Margaret MD    Cardiologist: Dr Ronaldo Rodarte and Plan      Current Problem List   Principal Problem:    SBO  Active Problems:    Atrial fibrillation with RVR ([facility] )    COPD (chronic obstructive pulmonary disease) ([facility] )    Acute respiratory failure (HCC)     Assessment/Plan:  1   [place] Atrial Fibrillation - with difficult to control rates in the setting of SBO     ? PO/NG Digoxin and metoprolol  ? Pay close attention to rate control as we transition to PO/NG medications  ? Monitor BP on metoprolol  ? Continue IV heparin, would transition back to oral anticoagulation one there is no more plan for further procedures and patient can tolerate PO medication    ? Monitor telemetry  2   Chronic diastolic CHF - not in acute exacerbation,   ?  Does appear to have iatrogenic fluid overload - has been on intermittent diuresis, consider additional diureses  3  Acute respiratory failure per primary  ? Continue to wear BiPAP when sleeping     4  Pulmonary Artery Aneurysm:   ? Noted on several previous echos  Most recent measurement 5 4cm  ? Needs continued outpatient surveillance given his acute presentation would not be appropriate for any surgical intervention this hospital stay  ? Follow up with thoracic surgery as outpatient      Subjective      CC: Afib with RVR      24 HR / Overnight Events:     No cardiac complaints this morning       Patient is relatively rate controlled (100's-110's) over the past 24 hours  Several episodes of rapid afib into the 150's overnight  AM dose of metoprolol was held yesterday due to BP  PM dose given     NG tube has been removed and patient is receiving oral digoxin and metoprolol       97% on 5L NC     He wore his BiPAP 4hrs  last night     Telemetry: Afib with RVR  One episode of NSVT   Multiple episodes of Rapid Afib into the 150's     Net fluid balance:  Positive  4L since yesterday (6/28)     Labs: HGB: 8 7, PTT 93, K+: 4 0  Digoxin level:  8 (6/27), Creat:  73 from   79     Family History: non-contributory        Family History: non-contributory  Historical Information   Past Medical History:   Diagnosis Date    Afib ([place] [address] )     Anemia     Anxiety     Cancer ([place] [address] )     Cardiac disease     Cellulitis     COPD (chronic obstructive pulmonary disease) ([place] [address] )     Depression     Diabetes mellitus ([place] [address] )     [facility]deep venous thrombosis) [facility]     GERD (gastroesophageal reflux disease)     HBP (high blood pressure)     Heart failure ([facility])     Hx of blood clots     Hypertension     Hypokalemia     Hypothyroid     Obesity     Psychiatric disorder      Past Surgical History:   Procedure Laterality Date    ABDOMINAL HERNIA REPAIR  05/2019    CHOLECYSTECTOMY      Lap    GASTRECTOMY SLEEVE LAPAROSCOPIC  08/2018    INCISION AND DRAINAGE OF WOUND Bilateral 12/01/2021    Procedure: INCISION AND DRAINAGE (I&D) HEAD/FACE;  Surgeon: Aureilo Bernardo MD;  Location:  MAIN OR;  Service: General    IR PICC PLACEMENT DOUBLE LUMEN  06/13/2022    IVC FILTER INSERTION  2018    KNEE SURGERY Right     open wound, injury     LAPAROTOMY N/A 6/14/2022    Procedure: LAPAROTOMY EXPLORATORY, EXTENSIVE LYSIS OF ADHESIONS, APPLICATION OF ABTHERA WOUND VAC;  Surgeon: Luis Enrique Rodriguez MD;  Location: [address] [place] [address];  Service: General    LAPAROTOMY N/A 6/15/2022    Procedure: LAPAROTOMY EXPLORATORY WITH REPAIR OF SEROSAL INJURY;  Surgeon: Rosy Mariano MD;  Location:  MAIN OR;  Service: General    LEG SURGERY Right 2009    US GUIDED VASCULAR ACCESS  08/06/2018    VAC DRESSING APPLICATION N/A 4/50/2109    Procedure: CHANGE DRESSING/VAC ABDOMEN;  Surgeon: Rosy Mariano MD;  [facility]: [facility];  Service: General    VAC DRESSING APPLICATION N/A 1/03/8308    Procedure: ABDOMINAL WASHOUT, REPAIR OF SEROSAL TEARS,BRIDING VICRYL MESH, PARTIAL CLOSURE, APPLICATION OF WOUND VAC;  Surgeon: Suzanne Parrish DO;  Location[facility];  Service: General     Social History   Social History     Substance and Sexual Activity   Alcohol Use Not Currently     Social History     Substance and Sexual Activity   Drug Use No     Social History     Tobacco Use   Smoking Status Former Smoker    Packs/day: 1 00    Years: 15 00    Pack years: 15 00   Smokeless Tobacco Never Used   Tobacco Comment    1 5ppd x 23 years, quit 2014     Family History:   Family History   Problem Relation Age of Onset   Yissel Mai Lung cancer Father     Diabetes Maternal Aunt     Heart attack Mother     Clotting disorder Mother     Hypertension Mother     Heart failure Mother     Diabetes Mother     Atrial fibrillation Mother     Sleep apnea Mother     Obesity Mother     Asthma Sister     Stroke Neg Hx     Anuerysm Neg Hx     Arrhythmia Neg Hx     Hyperlipidemia Neg Hx         pt unsure    Fainting Neg Hx        Review of Systems:  Review of Systems        Scheduled Meds:  Current Facility-Administered Medications   Medication Dose Route Frequency Provider Last Rate    acetaminophen  650 mg Oral Q6H Jeffery Persaud PA-C      Adult TPN (CUSTOM BASE/CUSTOM ELECTROLYTE)   Intravenous Continuous TPN Claudette Naik PA-C 75 1 mL/hr at 06/28/22 2206    Adult TPN (CUSTOM BASE/CUSTOM ELECTROLYTE)   Intravenous Continuous TPN Delilah Flores MD      bisacodyl  10 mg Rectal Daily Jeffery Persaud PA-C      budesonide-formoterol  2 puff Inhalation BID Jeffery Persaud PA-C      digoxin  125 mcg Oral Daily Vazquez Aleman DO      gabapentin  300 mg Oral TID Jeffery Persaud PA-C      heparin (porcine)  3-30 Units/kg/hr (Order-Specific) Intravenous Titrated ALESSANDRO Cagle-C 22 Units/kg/hr (06/29/22 0801)    heparin (porcine)  10,000 Units Intravenous Q1H PRN Jeffery Persaud PA-C      heparin (porcine)  5,000 Units Intravenous Q1H PRN Jeffery Persaud PA-C      HYDROmorphone  1 mg Intravenous Q6H PRN Kyaw Rogers PA-C      insulin lispro  1-5 Units Subcutaneous Q6H [address] Wei MeirALESSANDRO carmen-C      ipratropium  0 5 mg Nebulization TID Wei Worleykermit, PA-C      levalbuterol  1 25 mg Nebulization TID Wei Worleykermit, PA-C      levothyroxine  25 mcg Oral Early Morning Redmayco Worleykermit, PA-C      methocarbamol  500 mg Oral Q6H PRN Yairmayco Meirkermit, PA-C      metoclopramide  10 mg Intravenous Q6H PRN Carmen Hernandez MD      metoprolol tartrate  12 5 mg Oral Q12H [address] Rajinder Garcia DO      omeprazole (PRILOSEC) suspension 2 mg/mL  20 mg Oral Early Morning Yairmayco Meirkermit PA-C      ondansetron  4 mg Intravenous Q4H PRN Austin Reyes MD      oxyCODONE  10 mg Oral Q4H PRN Wei Worleypberry, PA-C      oxyCODONE  5 mg Oral Q4H PRN Wei Worleykermit, PA-C      polyethylene glycol  17 g Oral Daily Yairmayco Meirkermit, PA-C      QUEtiapine  50 mg Oral HS Wei Worleykermit PA-C      senna-docusate sodium  1 tablet Oral BID Wei Worleykermit, PA-C      spironolactone  50 mg Per NG Tube Daily Hellen R Rasmuson, PA-C      torsemide  40 mg Oral BID Hellen R Rasmuson, PA-C       Continuous Infusions:Adult TPN (CUSTOM BASE/CUSTOM ELECTROLYTE), , Last Rate: 75 1 mL/hr at 22 2206  Adult TPN (CUSTOM BASE/CUSTOM ELECTROLYTE),   heparin (porcine), 3-30 Units/kg/hr (Order-Specific), Last Rate: 22 Units/kg/hr (22 0801)      PRN Meds:   heparin (porcine)    heparin (porcine)    HYDROmorphone    methocarbamol    metoclopramide    ondansetron    oxyCODONE    oxyCODONE  all current active meds have been reviewed    Allergies   Allergen Reactions    Penicillins Hives       Objective   Vitals: Temp (24hrs), Av 7 °F (37 1 °C), Min:97 5 °F (36 4 °C), Max:99 3 °F (37 4 °C)  Current: Temperature: 97 5 °F (36 4 °C)  Patient Vitals for the past 24 hrs:   BP Temp Temp src Pulse Resp SpO2   22 0731 103/57 97 5 °F (36 4 °C) Oral 86 (!) 24 98 %   06/29/22 0221 117/76 -- -- 82 -- 96 %   06/29/22 0203 99/66 99 1 °F (37 3 °C) -- (!) 124 (!) 26 (!) 86 %   06/29/22 0016 -- -- -- 63 -- 97 %   06/28/22 2200 113/68 -- -- (!) 111 -- 91 %   06/28/22 2146 113/68 98 8 °F (37 1 °C) -- 104 -- 98 %   06/28/22 1920 128/94 99 3 °F (37 4 °C) -- 94 -- 97 %   06/28/22 1919 (!) 84/59 99 3 °F (37 4 °C) -- 99 -- 97 %   06/28/22 1437 95/60 98 3 °F (36 8 °C) -- 100 -- 95 %   06/28/22 1434 90/56 98 3 °F (36 8 °C) -- 102 22 96 %   06/28/22 1054 95/53 98 7 °F (37 1 °C) -- 100 19 94 %   06/28/22 0912 -- -- -- -- -- 90 %   06/28/22 0907 93/57 -- -- 95 -- 96 %    Body mass index is 70 6 kg/m²    Orthostatic Blood Pressures    Flowsheet Row Most Recent Value   Blood Pressure 103/57 filed at 06/29/2022 0731   Patient Position - Orthostatic VS Lying filed at 06/29/2022 0731              Invasive Devices  Report    Peripherally Inserted Central Catheter Line  Duration           PICC Line 29/61/69 Right Basilic 15 days          Drain  Duration           External Urinary Catheter 7 days                Physical Exam:  Physical Exam        Lab Results:   Results from last 7 days   Lab Units 06/29/22  0537 06/28/22  0542 06/27/22  0501 06/26/22  0653 06/25/22  0525 06/25/22  0000 06/24/22  0538 06/23/22  0543   WBC Thousand/uL 8 95 9 38 11 23* 8 77 8 59 7 96 8 43 9 31   HEMOGLOBIN g/dL 8 7* 9 2* 8 7* 8 6* 8 9* 8 5* 9 1* 8 8*   HEMATOCRIT % 28 2* 29 3* 28 4* 29 0* 29 6* 28 7* 29 0* 28 9*   PLATELETS Thousands/uL 249 259 239 248 271 260 270 239   MONO PCT % 7  --   --   --   --   --  2*  --       Results from last 7 days   Lab Units 06/29/22  0537 06/28/22  1954 06/28/22  1449 06/28/22  0542 06/27/22  0501 06/26/22  0653 06/26/22  0106 06/25/22  1822 06/25/22  1126 06/25/22  0525 06/24/22  2115 06/24/22  1334 06/24/22  0538 06/23/22  0543   SODIUM mmol/L 134*  --   --  134* 135* 135*  --   --   --  136  --   --  139 140   POTASSIUM mmol/L 4 0  --   --  3 9 4 3 4 2  --   --   -- 4 2  --   --  4 4 3 9   CHLORIDE mmol/L 91*  --   --  93* 92* 93*  --   --   --  98*  --   --  99* 98*   CO2 mmol/L 39*  --   --  39* 35* 38*  --   --   --  34*  --   --  35* 35*   BUN mg/dL 17  --   --  17 18 17  --   --   --  17  --   --  18 19   CREATININE mg/dL 0 73  --   --  0 70 0 68 0 64  --   --   --  0 50*  --   --  0 47* 0 49*   CALCIUM mg/dL 8 8  --   --  8 9 8 7 8 9  --   --   --  9 0  --   --  9 0 9 2   MAGNESIUM mg/dL  --   --   --  2 3 2 2 2 2  --   --   --  2 4  --   --   --  2 5   PHOSPHORUS mg/dL  --   --   --  3 3 4 6* 5 0*  --   --   --  4 3  --   --   --  3 6   PTT seconds 93* 78* 71* 156* 88* 75* 77* 95* 53* 61* 59* 139* 47* 88*   EGFR ml/min/1 73sq m 107  --   --  109 110 113  --   --   --  125  --   --  128 126     Results from last 7 days   Lab Units 06/29/22  0537 06/28/22  1954 06/28/22  1449 06/28/22  0542 06/27/22  0501 06/26/22  0653 06/26/22  0106 06/25/22  1822 06/25/22  1126 06/25/22  0525 06/24/22  2115 06/24/22  1334 06/24/22  0538 06/23/22  0543   PTT seconds 93* 78* 71* 156* 88* 75* 77* 95* 53* 61* 59* 139* 47* 88*             No results found for: PHART, EDQ8PDG, PO2ART, UHN6EHR, I8RFDLXX, BEART, SOURCE  No components found for: HIV1X2  No results found for: HAV, HEPAIGM, HEPBIGM, HEPBCAB, HBEAG, HEPCAB  No results found for: SPEP, UPEP   Lab Results   Component Value Date    HGBA1C 5 6 06/07/2022    HGBA1C 5 4 03/21/2022    HGBA1C 5 5 12/22/2021     No results found for: CHOL   Lab Results   Component Value Date    HDL 44 08/16/2016      Lab Results   Component Value Date    LDLCALC 80 08/16/2016      Lab Results   Component Value Date    TRIG 124 06/22/2022    TRIG 556 (H) 06/19/2022    TRIG 172 (H) 06/13/2022     No components found for: PROCAL          Imaging: I have personally reviewed pertinent reports

## 2022-06-29 NOTE — WOUND OSTOMY CARE
Consult Note - Wound   Calista Mayers 47 y o  male MRN: [id number]  Unit/Bed#: [facility] 607-01 Encounter: [id number]        History and Present Illness:  Patient is 48 yo male admitted to [facility] for treatment of small bowel obstruction  Patient is in bariatric kreg bed on low air loss mattress, patient is max assist with turning and repositioning which at this time he is refusing related to nausea  Patient has external urinary catheter and is incontinent of bowel  Patient refused to let team turn, reposition, and clean him for assessment stating he was too nauseous  Patient seen by trauma at the same time and will be placing NG tube to help decompress abdomen  Assessment Findings:     1  Posterior neck wound- dehisced sutured wound with moderate milky serosanguineous drainage, yellow and pink wound bed  New dressing applied per orders listed below  Patient tolerated well- no s/s of non-verbal pain or discomfort observed during the encounter  Bedside nurse aware of plan of care  See flow sheets for more detailed assessment findings  Wound care will continue to follow  Skin care plans:  1-Hydraguard to bilateral sacrum, buttock and heels BID and PRN  2-Elevate heels to offload pressure  3-Ehob cushion in chair when out of bed  4-Moisturize skin daily with skin nourishing cream   5-Turn/reposition q2h or when medically stable for pressure re-distribution on skin  -Irrigate wound to posterior neck with normal saline, apply silver alginate, cover with Allevyn foam  Change every other day and PRN soilage/displacement  Wounds:  Wound 06/15/22 Neck Posterior (Active)   Wound Image   06/15/22 1731   Wound Description Epithelialization;Slough;Granulation tissue 06/29/22 1400   Morena-wound Assessment Clean;Dry; Intact 06/29/22 1400   Wound Length (cm) 3 cm 06/29/22 1400   Wound Width (cm) 1 cm 06/29/22 1400   Wound Depth (cm) 0 2 cm 06/29/22 1400   Wound Surface Area (cm^2) 3 cm^2 06/29/22 1400   Wound Volume (cm^3) 0 6 cm^3 06/29/22 1400   Calculated Wound Volume (cm^3) 0 6 cm^3 06/29/22 1400   Tunneling 0 cm 06/29/22 1400   Tunneling in depth located at 0 06/29/22 1400   Undermining 0 06/29/22 1400   Undermining is depth extending from 0 06/29/22 1400   Wound Site Closure Sutures; Unapproximated 06/29/22 1400   Drainage Amount Moderate 06/29/22 1400   Drainage Description Serosanguineous;Teresa;Milky 06/29/22 1400   Non-staged Wound Description Full thickness 06/29/22 1400   Treatments Cleansed 06/29/22 1400   Dressing Calcium Alginate with Silver; Foam, Silicon (eg  Allevyn, etc) 06/29/22 1400   Wound packed?  No 06/29/22 1400   Packing- # removed 0 06/29/22 1400   Packing- # inserted 0 06/29/22 1400   Dressing Changed New 06/29/22 1400   Patient Tolerance Tolerated well 06/29/22 1400   Dressing Status Clean;Dry;New drainage 06/29/22 1400           Aimee DIANAN, RN, [facility] [employer]

## 2022-06-30 ENCOUNTER — ANESTHESIA (INPATIENT)
Dept: PERIOP | Facility: HOSPITAL | Age: 52
DRG: 230 | End: 2022-06-30
Payer: COMMERCIAL

## 2022-06-30 LAB
ABO GROUP BLD: NORMAL
ANION GAP SERPL CALCULATED.3IONS-SCNC: 5 MMOL/L (ref 4–13)
APTT PPP: 133 SECONDS (ref 23–37)
BLD GP AB SCN SERPL QL: NEGATIVE
BUN SERPL-MCNC: 20 MG/DL (ref 5–25)
CALCIUM SERPL-MCNC: 8.9 MG/DL (ref 8.3–10.1)
CHLORIDE SERPL-SCNC: 91 MMOL/L (ref 100–108)
CO2 SERPL-SCNC: 36 MMOL/L (ref 21–32)
CREAT SERPL-MCNC: 0.7 MG/DL (ref 0.6–1.3)
DIGOXIN SERPL-MCNC: 0.6 NG/ML (ref 0.8–2)
ERYTHROCYTE [DISTWIDTH] IN BLOOD BY AUTOMATED COUNT: 15.3 % (ref 11.6–15.1)
GFR SERPL CREATININE-BSD FRML MDRD: 109 ML/MIN/1.73SQ M
GLUCOSE SERPL-MCNC: 132 MG/DL (ref 65–140)
GLUCOSE SERPL-MCNC: 133 MG/DL (ref 65–140)
GLUCOSE SERPL-MCNC: 155 MG/DL (ref 65–140)
GLUCOSE SERPL-MCNC: 158 MG/DL (ref 65–140)
GLUCOSE SERPL-MCNC: 181 MG/DL (ref 65–140)
GLUCOSE SERPL-MCNC: 194 MG/DL (ref 65–140)
HCT VFR BLD AUTO: 28.8 % (ref 36.5–49.3)
HGB BLD-MCNC: 8.7 G/DL (ref 12–17)
MAGNESIUM SERPL-MCNC: 2.3 MG/DL (ref 1.6–2.6)
MCH RBC QN AUTO: 28.9 PG (ref 26.8–34.3)
MCHC RBC AUTO-ENTMCNC: 30.2 G/DL (ref 31.4–37.4)
MCV RBC AUTO: 96 FL (ref 82–98)
PHOSPHATE SERPL-MCNC: 3.3 MG/DL (ref 2.7–4.5)
PLATELET # BLD AUTO: 247 THOUSANDS/UL (ref 149–390)
PMV BLD AUTO: 12 FL (ref 8.9–12.7)
POTASSIUM SERPL-SCNC: 3.8 MMOL/L (ref 3.5–5.3)
RBC # BLD AUTO: 3.01 MILLION/UL (ref 3.88–5.62)
RH BLD: POSITIVE
SODIUM SERPL-SCNC: 132 MMOL/L (ref 136–145)
SPECIMEN EXPIRATION DATE: NORMAL
WBC # BLD AUTO: 9.15 THOUSAND/UL (ref 4.31–10.16)

## 2022-06-30 PROCEDURE — 0W9F0ZZ DRAINAGE OF ABDOMINAL WALL, OPEN APPROACH: ICD-10-PCS | Performed by: SURGERY

## 2022-06-30 PROCEDURE — 83735 ASSAY OF MAGNESIUM: CPT

## 2022-06-30 PROCEDURE — 80162 ASSAY OF DIGOXIN TOTAL: CPT | Performed by: INTERNAL MEDICINE

## 2022-06-30 PROCEDURE — 94760 N-INVAS EAR/PLS OXIMETRY 1: CPT

## 2022-06-30 PROCEDURE — 94660 CPAP INITIATION&MGMT: CPT

## 2022-06-30 PROCEDURE — 97606 NEG PRS WND THER DME>50 SQCM: CPT | Performed by: SURGERY

## 2022-06-30 PROCEDURE — 99232 SBSQ HOSP IP/OBS MODERATE 35: CPT | Performed by: INTERNAL MEDICINE

## 2022-06-30 PROCEDURE — 2W03X6Z CHANGE PRESSURE DRESSING ON ABDOMINAL WALL: ICD-10-PCS | Performed by: SURGERY

## 2022-06-30 PROCEDURE — 86900 BLOOD TYPING SEROLOGIC ABO: CPT | Performed by: SURGERY

## 2022-06-30 PROCEDURE — 82948 REAGENT STRIP/BLOOD GLUCOSE: CPT

## 2022-06-30 PROCEDURE — 85027 COMPLETE CBC AUTOMATED: CPT

## 2022-06-30 PROCEDURE — 86850 RBC ANTIBODY SCREEN: CPT | Performed by: SURGERY

## 2022-06-30 PROCEDURE — 84100 ASSAY OF PHOSPHORUS: CPT

## 2022-06-30 PROCEDURE — 80048 BASIC METABOLIC PNL TOTAL CA: CPT

## 2022-06-30 PROCEDURE — 99024 POSTOP FOLLOW-UP VISIT: CPT | Performed by: SURGERY

## 2022-06-30 PROCEDURE — 86901 BLOOD TYPING SEROLOGIC RH(D): CPT | Performed by: SURGERY

## 2022-06-30 PROCEDURE — 94640 AIRWAY INHALATION TREATMENT: CPT

## 2022-06-30 RX ORDER — SODIUM CHLORIDE, SODIUM LACTATE, POTASSIUM CHLORIDE, CALCIUM CHLORIDE 600; 310; 30; 20 MG/100ML; MG/100ML; MG/100ML; MG/100ML
INJECTION, SOLUTION INTRAVENOUS CONTINUOUS PRN
Status: DISCONTINUED | OUTPATIENT
Start: 2022-06-30 | End: 2022-06-30

## 2022-06-30 RX ORDER — HYDROMORPHONE HCL IN WATER/PF 6 MG/30 ML
0.2 PATIENT CONTROLLED ANALGESIA SYRINGE INTRAVENOUS
Status: DISCONTINUED | OUTPATIENT
Start: 2022-06-30 | End: 2022-06-30 | Stop reason: HOSPADM

## 2022-06-30 RX ORDER — SUCCINYLCHOLINE/SOD CL,ISO/PF 100 MG/5ML
SYRINGE (ML) INTRAVENOUS AS NEEDED
Status: DISCONTINUED | OUTPATIENT
Start: 2022-06-30 | End: 2022-06-30

## 2022-06-30 RX ORDER — SODIUM CHLORIDE, SODIUM LACTATE, POTASSIUM CHLORIDE, CALCIUM CHLORIDE 600; 310; 30; 20 MG/100ML; MG/100ML; MG/100ML; MG/100ML
50 INJECTION, SOLUTION INTRAVENOUS CONTINUOUS
Status: DISCONTINUED | OUTPATIENT
Start: 2022-06-30 | End: 2022-06-30

## 2022-06-30 RX ORDER — MAGNESIUM HYDROXIDE 1200 MG/15ML
LIQUID ORAL AS NEEDED
Status: DISCONTINUED | OUTPATIENT
Start: 2022-06-30 | End: 2022-06-30 | Stop reason: HOSPADM

## 2022-06-30 RX ORDER — NOREPINEPHRINE BITARTRATE 1 MG/ML
INJECTION, SOLUTION INTRAVENOUS AS NEEDED
Status: DISCONTINUED | OUTPATIENT
Start: 2022-06-30 | End: 2022-06-30

## 2022-06-30 RX ORDER — DEXAMETHASONE SODIUM PHOSPHATE 10 MG/ML
INJECTION, SOLUTION INTRAMUSCULAR; INTRAVENOUS AS NEEDED
Status: DISCONTINUED | OUTPATIENT
Start: 2022-06-30 | End: 2022-06-30

## 2022-06-30 RX ORDER — ONDANSETRON 2 MG/ML
4 INJECTION INTRAMUSCULAR; INTRAVENOUS ONCE AS NEEDED
Status: DISCONTINUED | OUTPATIENT
Start: 2022-06-30 | End: 2022-06-30 | Stop reason: HOSPADM

## 2022-06-30 RX ORDER — FENTANYL CITRATE 50 UG/ML
INJECTION, SOLUTION INTRAMUSCULAR; INTRAVENOUS AS NEEDED
Status: DISCONTINUED | OUTPATIENT
Start: 2022-06-30 | End: 2022-06-30

## 2022-06-30 RX ORDER — POTASSIUM CHLORIDE 20 MEQ/1
40 TABLET, EXTENDED RELEASE ORAL ONCE
Status: COMPLETED | OUTPATIENT
Start: 2022-06-30 | End: 2022-06-30

## 2022-06-30 RX ORDER — FENTANYL CITRATE/PF 50 MCG/ML
25 SYRINGE (ML) INJECTION
Status: DISCONTINUED | OUTPATIENT
Start: 2022-06-30 | End: 2022-06-30 | Stop reason: HOSPADM

## 2022-06-30 RX ORDER — PROPOFOL 10 MG/ML
INJECTION, EMULSION INTRAVENOUS AS NEEDED
Status: DISCONTINUED | OUTPATIENT
Start: 2022-06-30 | End: 2022-06-30

## 2022-06-30 RX ORDER — CEFAZOLIN SODIUM 1 G/3ML
INJECTION, POWDER, FOR SOLUTION INTRAMUSCULAR; INTRAVENOUS AS NEEDED
Status: DISCONTINUED | OUTPATIENT
Start: 2022-06-30 | End: 2022-06-30

## 2022-06-30 RX ORDER — LIDOCAINE HYDROCHLORIDE 10 MG/ML
INJECTION, SOLUTION EPIDURAL; INFILTRATION; INTRACAUDAL; PERINEURAL AS NEEDED
Status: DISCONTINUED | OUTPATIENT
Start: 2022-06-30 | End: 2022-06-30

## 2022-06-30 RX ADMIN — Medication 200 MG: at 13:18

## 2022-06-30 RX ADMIN — SENNOSIDES AND DOCUSATE SODIUM 1 TABLET: 8.6; 5 TABLET ORAL at 17:12

## 2022-06-30 RX ADMIN — METOROPROLOL TARTRATE 2.5 MG: 5 INJECTION, SOLUTION INTRAVENOUS at 10:06

## 2022-06-30 RX ADMIN — OXYCODONE HYDROCHLORIDE 10 MG: 5 SOLUTION ORAL at 15:44

## 2022-06-30 RX ADMIN — QUETIAPINE FUMARATE 50 MG: 25 TABLET ORAL at 21:17

## 2022-06-30 RX ADMIN — ACETAMINOPHEN 650 MG: 650 SUSPENSION ORAL at 06:15

## 2022-06-30 RX ADMIN — IPRATROPIUM BROMIDE 0.5 MG: 0.5 SOLUTION RESPIRATORY (INHALATION) at 08:49

## 2022-06-30 RX ADMIN — NOREPINEPHRINE BITARTRATE 16 MCG: 1 INJECTION, SOLUTION, CONCENTRATE INTRAVENOUS at 13:50

## 2022-06-30 RX ADMIN — INSULIN LISPRO 1 UNITS: 100 INJECTION, SOLUTION INTRAVENOUS; SUBCUTANEOUS at 23:50

## 2022-06-30 RX ADMIN — NOREPINEPHRINE BITARTRATE 16 MCG: 1 INJECTION, SOLUTION, CONCENTRATE INTRAVENOUS at 13:29

## 2022-06-30 RX ADMIN — METOROPROLOL TARTRATE 2.5 MG: 5 INJECTION, SOLUTION INTRAVENOUS at 17:12

## 2022-06-30 RX ADMIN — OXYCODONE HYDROCHLORIDE 5 MG: 5 SOLUTION ORAL at 10:10

## 2022-06-30 RX ADMIN — INSULIN LISPRO 1 UNITS: 100 INJECTION, SOLUTION INTRAVENOUS; SUBCUTANEOUS at 17:26

## 2022-06-30 RX ADMIN — LEVALBUTEROL HYDROCHLORIDE 1.25 MG: 1.25 SOLUTION, CONCENTRATE RESPIRATORY (INHALATION) at 08:49

## 2022-06-30 RX ADMIN — Medication 20 MG: at 06:15

## 2022-06-30 RX ADMIN — FENTANYL CITRATE 50 MCG: 50 INJECTION INTRAMUSCULAR; INTRAVENOUS at 14:33

## 2022-06-30 RX ADMIN — CEFAZOLIN 1000 MG: 1 INJECTION, POWDER, FOR SOLUTION INTRAMUSCULAR; INTRAVENOUS at 13:28

## 2022-06-30 RX ADMIN — DEXAMETHASONE SODIUM PHOSPHATE 10 MG: 10 INJECTION, SOLUTION INTRAMUSCULAR; INTRAVENOUS at 13:18

## 2022-06-30 RX ADMIN — METOROPROLOL TARTRATE 2.5 MG: 5 INJECTION, SOLUTION INTRAVENOUS at 05:08

## 2022-06-30 RX ADMIN — PROPOFOL 50 MG: 10 INJECTION, EMULSION INTRAVENOUS at 13:42

## 2022-06-30 RX ADMIN — BUDESONIDE AND FORMOTEROL FUMARATE DIHYDRATE 2 PUFF: 160; 4.5 AEROSOL RESPIRATORY (INHALATION) at 21:21

## 2022-06-30 RX ADMIN — LIDOCAINE HYDROCHLORIDE 50 MG: 10 INJECTION, SOLUTION EPIDURAL; INFILTRATION; INTRACAUDAL at 13:18

## 2022-06-30 RX ADMIN — NOREPINEPHRINE BITARTRATE 16 MCG: 1 INJECTION, SOLUTION, CONCENTRATE INTRAVENOUS at 13:55

## 2022-06-30 RX ADMIN — POTASSIUM CHLORIDE 40 MEQ: 1500 TABLET, EXTENDED RELEASE ORAL at 10:09

## 2022-06-30 RX ADMIN — ACETAMINOPHEN 650 MG: 650 SUSPENSION ORAL at 17:11

## 2022-06-30 RX ADMIN — GABAPENTIN 300 MG: 250 SOLUTION ORAL at 15:44

## 2022-06-30 RX ADMIN — PROPOFOL 200 MG: 10 INJECTION, EMULSION INTRAVENOUS at 13:18

## 2022-06-30 RX ADMIN — IPRATROPIUM BROMIDE 0.5 MG: 0.5 SOLUTION RESPIRATORY (INHALATION) at 19:59

## 2022-06-30 RX ADMIN — LEVOTHYROXINE SODIUM 25 MCG: 25 TABLET ORAL at 06:15

## 2022-06-30 RX ADMIN — INSULIN LISPRO 1 UNITS: 100 INJECTION, SOLUTION INTRAVENOUS; SUBCUTANEOUS at 06:27

## 2022-06-30 RX ADMIN — PROPOFOL 50 MG: 10 INJECTION, EMULSION INTRAVENOUS at 14:11

## 2022-06-30 RX ADMIN — FENTANYL CITRATE 50 MCG: 50 INJECTION INTRAMUSCULAR; INTRAVENOUS at 13:43

## 2022-06-30 RX ADMIN — Medication: at 21:18

## 2022-06-30 RX ADMIN — NOREPINEPHRINE BITARTRATE 5 MCG/MIN: 1 INJECTION, SOLUTION, CONCENTRATE INTRAVENOUS at 13:29

## 2022-06-30 RX ADMIN — Medication 30 MG: at 13:18

## 2022-06-30 RX ADMIN — HEPARIN SODIUM 21 UNITS/KG/HR: 10000 INJECTION, SOLUTION INTRAVENOUS at 18:16

## 2022-06-30 RX ADMIN — SENNOSIDES AND DOCUSATE SODIUM 1 TABLET: 8.6; 5 TABLET ORAL at 10:12

## 2022-06-30 RX ADMIN — LEVALBUTEROL HYDROCHLORIDE 1.25 MG: 1.25 SOLUTION, CONCENTRATE RESPIRATORY (INHALATION) at 19:59

## 2022-06-30 RX ADMIN — BUDESONIDE AND FORMOTEROL FUMARATE DIHYDRATE 2 PUFF: 160; 4.5 AEROSOL RESPIRATORY (INHALATION) at 10:10

## 2022-06-30 RX ADMIN — CEFAZOLIN SODIUM 2000 MG: 2 SOLUTION INTRAVENOUS at 13:01

## 2022-06-30 RX ADMIN — INSULIN LISPRO 1 UNITS: 100 INJECTION, SOLUTION INTRAVENOUS; SUBCUTANEOUS at 00:08

## 2022-06-30 RX ADMIN — HEPARIN SODIUM 21 UNITS/KG/HR: 10000 INJECTION, SOLUTION INTRAVENOUS at 01:10

## 2022-06-30 RX ADMIN — GABAPENTIN 300 MG: 250 SOLUTION ORAL at 21:17

## 2022-06-30 RX ADMIN — ACETAMINOPHEN 650 MG: 650 SUSPENSION ORAL at 23:44

## 2022-06-30 RX ADMIN — GABAPENTIN 300 MG: 250 SOLUTION ORAL at 10:10

## 2022-06-30 RX ADMIN — DIGOXIN 125 MCG: 0.25 INJECTION INTRAMUSCULAR; INTRAVENOUS at 10:12

## 2022-06-30 RX ADMIN — SODIUM CHLORIDE, SODIUM LACTATE, POTASSIUM CHLORIDE, AND CALCIUM CHLORIDE: .6; .31; .03; .02 INJECTION, SOLUTION INTRAVENOUS at 13:00

## 2022-06-30 NOTE — PROGRESS NOTES
Progress Note - General Surgery   Kathryn Piedra 47 y o  male MRN: [id number]  Unit/Bed#: [facility] 607-01 Encounter: [id number]    Assessment:  48yoM p/w SBO 2/2 chronic incarcerated ventral hernia now s/p   6/14  ex lap, HARVEY 6/14, also s/p abdominal washout, repair of serosal tears,  6/15 washout,abhera, VAC DPR  6/17 bridging vicryl mesh placement, wound vac placement   6/30 I&D, washout and vac placement    NGT 400cc  Vac minimal SS    Plan:  · NPO/NGT  · Continue TPN  · VAC change Saturday, if clean, will schedule STSG on Monday  · Appreciate Cards recs  · Please tigertext on call red surgery resident role or acute care floor call role with any questions      Subjective/Objective   Subjective:   NAEO  Flatus   No BM  Bipap overnight  Objective:     Blood pressure 96/59, pulse 95, temperature (!) 96 9 °F (36 1 °C), resp  rate (!) 34, height 5' 11" (1 803 m), weight (!) 230 kg (506 lb 2 8 oz), SpO2 97 %  ,Body mass index is 70 6 kg/m²  Intake/Output Summary (Last 24 hours) at 7/1/2022 0744  Last data filed at 7/1/2022 0601  Gross per 24 hour   Intake 2767 14 ml   Output 1330 ml   Net 1437 14 ml       Invasive Devices  Report    Peripherally Inserted Central Catheter Line  Duration           PICC Line 09/77/46 Right Basilic 17 days          Drain  Duration           NG/OG/Enteral Tube Nasogastric Right nare 1 day                Physical Exam:   Gen:  NAD  HEENT: NCAT  MMM  CV: well perfused, pulses palpable  Lungs: Normal respiratory effort  Abd: soft, nt/nd vac intact  Skin: warm/ dry  Extremities: no peripheral edema, no cyanosis  Neuro: AxO x3        Lab, Imaging and other studies:  I have personally reviewed pertinent lab results    , CBC:   Lab Results   Component Value Date    WBC 9 36 07/01/2022    HGB 8 6 (L) 07/01/2022    HCT 27 9 (L) 07/01/2022    MCV 95 07/01/2022     07/01/2022    MCH 29 4 07/01/2022    MCHC 30 8 (L) 07/01/2022    RDW 15 1 07/01/2022    MPV 11 2 07/01/2022   , CMP:   Lab Results   Component Value Date    SODIUM 132 (L) 07/01/2022    K 4 8 07/01/2022    CL 93 (L) 07/01/2022    CO2 36 (H) 07/01/2022    BUN 21 07/01/2022    CREATININE 0 61 07/01/2022    CALCIUM 8 8 07/01/2022    EGFR 115 07/01/2022     VTE Pharmacologic Prophylaxis: heparin  VTE Mechanical Prophylaxis: sequential compression device

## 2022-06-30 NOTE — PLAN OF CARE
Problem: Prexisting or High Potential for Compromised Skin Integrity  Goal: Skin integrity is maintained or improved  Description: INTERVENTIONS:  - Identify patients at risk for skin breakdown  - Assess and monitor skin integrity  - Assess and monitor nutrition and hydration status  - Monitor labs   - Assess for incontinence   - Turn and reposition patient  - Assist with mobility/ambulation  - Relieve pressure over bony prominences  - Avoid friction and shearing  - Provide appropriate hygiene as needed including keeping skin clean and dry  - Evaluate need for skin moisturizer/barrier cream  - Collaborate with interdisciplinary team   - Patient/family teaching  - Consider wound care consult   Outcome: Progressing     Problem: Potential for Falls  Goal: Patient will remain free of falls  Description: INTERVENTIONS:  - Educate patient/family on patient safety including physical limitations  - Instruct patient to call for assistance with activity   - Consult OT/PT to assist with strengthening/mobility   - Keep Call bell within reach  - Keep bed low and locked with side rails adjusted as appropriate  - Keep care items and personal belongings within reach  - Initiate and maintain comfort rounds  - Make Fall Risk Sign visible to staff  - Offer Toileting every 1 Hours, in advance of need  - Initiate/Maintain alarm  - Obtain necessary fall risk management equipment  - Apply yellow socks and bracelet for high fall risk patients  - Consider moving patient to room near nurses station  Outcome: Progressing     Problem: MOBILITY - ADULT  Goal: Maintain or return to baseline ADL function  Description: INTERVENTIONS:  -  Assess patient's ability to carry out ADLs; assess patient's baseline for ADL function and identify physical deficits which impact ability to perform ADLs (bathing, care of mouth/teeth, toileting, grooming, dressing, etc )  - Assess/evaluate cause of self-care deficits   - Assess range of motion  - Assess patient's mobility; develop plan if impaired  - Assess patient's need for assistive devices and provide as appropriate  - Encourage maximum independence but intervene and supervise when necessary  - Involve family in performance of ADLs  - Assess for home care needs following discharge   - Consider OT consult to assist with ADL evaluation and planning for discharge  - Provide patient education as appropriate  Outcome: Progressing  Goal: Maintains/Returns to pre admission functional level  Description: INTERVENTIONS:  - Perform BMAT or MOVE assessment daily    - Set and communicate daily mobility goal to care team and patient/family/caregiver  - Collaborate with rehabilitation services on mobility goals if consulted  - Perform Range of Motion 3 times a day  - Reposition patient every 2 hours  - Dangle patient 3 times a day  - Stand patient 3 times a day  - Ambulate patient 3 times a day  - Out of bed to chair 3 times a day   - Out of bed for meals 3 times a day  - Out of bed for toileting  - Record patient progress and toleration of activity level   Outcome: Progressing     Problem: Nutrition/Hydration-ADULT  Goal: Nutrient/Hydration intake appropriate for improving, restoring or maintaining nutritional needs  Description: Monitor and assess patient's nutrition/hydration status for malnutrition  Collaborate with interdisciplinary team and initiate plan and interventions as ordered  Monitor patient's weight and dietary intake as ordered or per policy  Utilize nutrition screening tool and intervene as necessary  Determine patient's food preferences and provide high-protein, high-caloric foods as appropriate       INTERVENTIONS:  - Monitor oral intake, urinary output, labs, and treatment plans  - Assess nutrition and hydration status and recommend course of action  - Evaluate amount of meals eaten  - Assist patient with eating if necessary   - Allow adequate time for meals  - Recommend/ encourage appropriate diets, oral nutritional supplements, and vitamin/mineral supplements  - Order, calculate, and assess calorie counts as needed  - Recommend, monitor, and adjust tube feedings and TPN/PPN based on assessed needs  - Assess need for intravenous fluids  - Provide specific nutrition/hydration education as appropriate  - Include patient/family/caregiver in decisions related to nutrition  Outcome: Progressing     Problem: PAIN - ADULT  Goal: Verbalizes/displays adequate comfort level or baseline comfort level  Description: Interventions:  - Encourage patient to monitor pain and request assistance  - Assess pain using appropriate pain scale  - Administer analgesics based on type and severity of pain and evaluate response  - Implement non-pharmacological measures as appropriate and evaluate response  - Consider cultural and social influences on pain and pain management  - Notify physician/advanced practitioner if interventions unsuccessful or patient reports new pain  Outcome: Progressing     Problem: INFECTION - ADULT  Goal: Absence or prevention of progression during hospitalization  Description: INTERVENTIONS:  - Assess and monitor for signs and symptoms of infection  - Monitor lab/diagnostic results  - Monitor all insertion sites, i e  indwelling lines, tubes, and drains  - Monitor endotracheal if appropriate and nasal secretions for changes in amount and color  - [place] appropriate cooling/warming therapies per order  - Administer medications as ordered  - Instruct and encourage patient and family to use good hand hygiene technique  - Identify and instruct in appropriate isolation precautions for identified infection/condition  Outcome: Progressing     Problem: DISCHARGE PLANNING  Goal: Discharge to home or other facility with appropriate resources  Description: INTERVENTIONS:  - Identify barriers to discharge w/patient and caregiver  - Arrange for needed discharge resources and transportation as appropriate  - Identify discharge learning needs (meds, wound care, etc )  - Arrange for interpretive services to assist at discharge as needed  - Refer to Case Management Department for coordinating discharge planning if the patient needs post-hospital services based on physician/advanced practitioner order or complex needs related to functional status, cognitive ability, or social support system  Outcome: Progressing     Problem: Knowledge Deficit  Goal: Patient/family/caregiver demonstrates understanding of disease process, treatment plan, medications, and discharge instructions  Description: Complete learning assessment and assess knowledge base    Interventions:  - Provide teaching at level of understanding  - Provide teaching via preferred learning methods  Outcome: Progressing

## 2022-06-30 NOTE — OP NOTE
OPERATIVE REPORT  PATIENT NAME: Gina Levy    :  1970  MRN: [id number]  Pt Location: [facility] OR ROOM 06    SURGERY DATE: 2022    Surgeon(s) and Role:     * Lorene Tinajero DO - Primary     * Hernán Jim MD - Assisting     * Karolina Kelley MD - Assisting    Preop Diagnosis:  Small bowel obstruction ([address] ) [E33 [id number]]    Post-Op Diagnosis Codes:     * Small bowel obstruction ([address] ) [K35 [id number]]    Procedure(s) (LRB):  incision and drainage, wash out vac change (N/A)  CHANGE DRESSING/VAC ABDOMEN (N/A)    Specimen(s):  * No specimens in log *    Estimated Blood Loss:   Minimal    Drains:  NG/OG/Enteral Tube Nasogastric Right nare (Active)   Site Assessment Clean;Dry 22 1014   Status Suction-low continuous 22 1134   Drainage Appearance Bile 22 1134   Intake (mL) 290 mL 22 1014   Output (mL) 350 mL 22 0623   Number of days: 1       Anesthesia Type:   General    Operative Indications:  Small bowel obstruction ([address] ) [K56 [id number]]      Operative Findings:  -3d3n3uv abscess cavity with purulent drainage at 8'oclock on skin lateral to abdominal surgical site  - no bowel involvement, no obvious fistula  - 6 white sponges, 1 Black sponge, 1 adaptic  - Dimensions: 30 cm x 22 cm x 0 5 cm    Complications:   None    Procedure and Technique:  Patient was brought to preop holding area and identified both verbally by name and by armband  Patient was brought to the operating room, and placed supine on the operating room table  General anesthesia was induced, airway was secured with ETT  Patient was prepped and draped in the usual sterile fashion  Time-out was called, and all were in agreement begin the procedure  Upon inspection of the abdominal wall , the 8 o clock position was noted to be raised, without erythema  With gentle pressure, a light brown fluid was expressed  The wound cavity was suctioned with purulent fluid expressed  This was evacuated completely and explored   It was approximately 7x3x2 cm in dimension  This was connected to the lateral staple site in the right lower quadrant  Prior surgical staples were removed bilaterally  The wound cavity was copiously irrigated with sterile saline  The remainder of the abdominal surgical site was irrigated with sterile saline  Wound dimensions as above  Six white sponges were placed over the abdominal surgical site  One of these sponges was tunneled into the prior abscess cavity  This was covered by 1 black sponge  This was placed to wound VAC at -125mmHg  All sponge and instrument counts x2 were correct  The patient was awakened from anesthesia extubated and transported to PACU in stable condition  Dr Kevin Dexter was present for the entire procedure      Patient Disposition:  PACU       SIGNATURE: Arnoldo Marcum MD  DATE: June 30, 2022  TIME: 2:21 PM

## 2022-06-30 NOTE — ANESTHESIA POSTPROCEDURE EVALUATION
Post-Op Assessment Note    CV Status:  Stable  Pain Score: 2    Pain management: adequate     Mental Status:  Alert and awake   Hydration Status:  Euvolemic and stable   PONV Controlled:  Controlled   Airway Patency:  Patent      Post Op Vitals Reviewed: Yes      Staff: CRNA, Anesthesiologist   Comments: Report given to recovering RN, VSS, Pt states he is comfortable        No complications documented      /65 (06/30/22 1435)    Temp 98 °F (36 7 °C) (06/30/22 1435)    Pulse (!) 111 (06/30/22 1435)   Resp 16 (06/30/22 1435)    SpO2 96 % (06/30/22 1435)

## 2022-06-30 NOTE — PROGRESS NOTES
Cardiology Progress Note - Ellie Hall 47 y o  male MRN: [id number]    Unit/Bed#: [facility] 607-01 Encounter: [id number]      Assessment:  Principal Problem:    SBO  Active Problems:    Atrial fibrillation with RVR (HCC)    COPD (chronic obstructive pulmonary disease) (HCC)    Acute respiratory failure (HCC)      Plan:  Patient with no chest pain or significant dyspnea this morning  NG tube was reinserted yesterday  Patient back on intravenous therapy in reference to atrial fibrillation and rate control  Intravenous heparin on hold given that patient will go to the OR today  He continues on TPN  CBC today with hemoglobin of 8 7  No change in cardiac management  Subjective:   Patient seen and examined  Objective:     Vitals: Blood pressure 111/59, pulse 88, temperature 97 6 °F (36 4 °C), resp  rate 22, height 5' 11" (1 803 m), weight (!) 230 kg (506 lb 2 8 oz), SpO2 98 %  , Body mass index is 70 6 kg/m² ,   Orthostatic Blood Pressures    Flowsheet Row Most Recent Value   Blood Pressure 111/59 filed at 06/30/2022 0746   Patient Position - Orthostatic VS Lying filed at 06/29/2022 0731      ,      Intake/Output Summary (Last 24 hours) at 6/30/2022 0924  Last data filed at 6/30/2022 0636  Gross per 24 hour   Intake 3911 87 ml   Output 2865 ml   Net 1046 87 ml       No significant arrhythmias seen on telemetry review  Atrial fibrillation      Physical Exam:    GEN: Ellie Hall   NECK: supple, no carotid bruits, no JVD or HJR  HEART: normal rate, irregular rhythm, normal S1 and S2, no murmurs, clicks, gallops or rubs   LUNGS: clear to auscultation bilaterally in anterior lung fields no wheezes, rales, or rhonchi   EXTREMITIES: edema  SKIN: warm and well perfused, no suspicious lesions on exposed skin    Labs & Results:    No results displayed because visit has over 200 results            CT abdomen pelvis wo contrast    Result Date: 6/4/2022  Narrative: CT ABDOMEN AND PELVIS WITHOUT IV CONTRAST INDICATION:   Bowel obstruction suspected Abdominal pain, acute, nonlocalized diffuse abdominal pain and vomiting, Hx of obstruction    COMPARISON:  None  TECHNIQUE:  CT examination of the abdomen and pelvis was performed without intravenous contrast  This examination was performed without intravenous contrast in the context of the critical nationwide Omnipaque shortage  Axial, sagittal, and coronal 2D reformatted images were created from the source data and submitted for interpretation  Radiation dose length product (DLP) for this visit:  2495 6 mGy-cm   This examination, like all CT scans performed in the [facility], was performed utilizing techniques to minimize radiation dose exposure, including the use of iterative  reconstruction and automated exposure control  Enteric contrast was administered  FINDINGS: ABDOMEN LOWER CHEST:  No clinically significant abnormality identified in the visualized lower chest  LIVER/BILIARY TREE:  Unremarkable  GALLBLADDER:  No calcified gallstones  No pericholecystic inflammatory change  SPLEEN:  Unremarkable  PANCREAS:  Unremarkable  ADRENAL GLANDS:  Unremarkable  KIDNEYS/URETERS:  Unremarkable  No hydronephrosis  STOMACH AND BOWEL:  Markedly distended duodenum and proximal jejunal up to 6 5 cm with abrupt transition in the left abdomen (series 201, image 52)  APPENDIX:  No findings to suggest appendicitis  ABDOMINOPELVIC CAVITY:  No ascites  No pneumoperitoneum  No lymphadenopathy  VESSELS:  Unremarkable for patient's age  PELVIS REPRODUCTIVE ORGANS:  Unremarkable for patient's age  URINARY BLADDER:  Unremarkable  ABDOMINAL WALL/INGUINAL REGIONS:  Large ventral abdominal wall hernia containing numerous loops of small and large bowel  OSSEOUS STRUCTURES:  No acute fracture or destructive osseous lesion  Impression: Findings consistent with small bowel obstruction at the proximal jejunum with transition point at the left upper abdomen   Findings discussed with Dr Nathaly Thompson at [facility] AM, 6/4/2022 Workstation performed: [id number]     XR chest portable    Result Date: 6/24/2022  Narrative: CHEST INDICATION:   Concern for aspiration  COMPARISON:  June 21, 2022 EXAM PERFORMED/VIEWS:  XR CHEST PORTABLE Single image FINDINGS:  Diminished lung volumes  Mild cardiomegaly  Diffuse interstitial prominence with peribronchial thickening and vascular congestion  Enteric tube in: The stomach  Right PICC line catheter can be followed to the mid SVC, tip is not seen  Numerous EKG leads in place  The lungs are clear  No pneumothorax or pleural effusion  Osseous structures appear within normal limits for patient age  Impression: Diminished lung volumes  Pulmonary edema exaggerated by vascular crowding  Workstation performed: [id number]     XR chest portable    Result Date: 6/22/2022  Narrative: CHEST INDICATION:   vomiting/aspiration rapid response  COMPARISON:  6/18/2022 EXAM PERFORMED/VIEWS:  XR CHEST PORTABLE FINDINGS: Interval removal of the ET tube is visualized  Interval removal of the right-sided central line is visualized  Persistent NG tube is seen extending into the mid stomach  Persistent right-sided PICC line is seen extending into the superior vena cava  Limited examination due to low lung volume and rotation of the patient  There is persistent bilateral particularly left perihilar and basilar infiltrate  Associated bilateral pleural effusion cannot be excluded  No pneumothorax is visualized  Unchanged cardiac silhouette is visualized  Impression: Interval removal of the ET tube and right-sided central line with persistent NG tube and right-sided PICC line Unchanged bilateral particularly left-sided airspace disease identified Workstation performed: [id number]     XR chest portable    Result Date: 6/13/2022  Narrative: CHEST INDICATION:   line placement  COMPARISON:  May 25, 2022 EXAM PERFORMED/VIEWS:  XR CHEST PORTABLE FINDINGS:  A nasogastric tube has been placed    It extends below the diaphragm but its tip is not included on this exam   A right-sided PICC line is present with its tip in the expected location of the superior vena cava  The heart is enlarged  The pulmonary vessels are distended  Evaluation of the lungs is limited by patient size  There appear to be hazy opacities in both lungs  No evidence of consolidation  There is no evidence of pleural effusion or pneumothorax  Osseous structures appear within normal limits for patient age  Impression: 1  Tip of right-sided PICC line in superior vena cava  2   3   Nasogastric tube present although tip not included on this exam  4   Pulmonary vascular congestion and suggestion of mild pulmonary edema  Workstation performed: [id number]     XR abdomen 1 view kub    Result Date: 6/30/2022  Narrative: ABDOMEN INDICATION:   Need to assess for progression of contrast given earlier today for CT scan with PO contrast  COMPARISON:  Abdominal radiograph June 29, 2022 at 12:49 VIEWS:  AP supine FINDINGS: Tip of enteric tube projects over the stomach  Single short segment contrast-filled loop of small bowel is dilated to 5 cm in the central abdomen, likely within the distal duodenum  Enteric contrast opacifies the nondistended ascending, transverse, and proximal descending colon  Mildly dilated loops of air-filled colon in the lower left abdomen  Impression: Contrast has passed into the proximal descending colon  Mildly dilated loops of air-filled distal colon  A distal obstruction is not excluded, and follow-up radiograph could be considered at this time to confirm progression of contrast  The study was marked in [facility] for immediate notification  Workstation performed: [id number]     XR abdomen 1 view kub    Result Date: 6/27/2022  Narrative: ABDOMEN INDICATION:  NGT placement   COMPARISON:  Abdominal radiographs 6/21/2022, CT abdomen and pelvis 6/4/2022 VIEWS:  AP semierect-portable Images: 1 *(centered over the thoracoabdominal junction as per protocol for this indication); The lower abdomen and pelvis were excluded from the field-of-view  FINDINGS: Enteric tube courses to the stomach, the distal tip is looped back upon itself terminating in the region of the distal esophagus  Nonspecific bowel gas pattern  Contrast material seen within central small bowel loop  No discernible free air on this limited portable study  No pathologic calcifications or soft tissue masses as visualized  Visualized lung bases are grossly clear  Visualized osseous structures are unremarkable for the patient's age  Impression: Enteric tube tip looped upon itself terminating in the region of the distal esophagus  Repositioning advised  Nonspecific bowel gas pattern  The study was marked in [facility] for immediate notification  Workstation performed: [id number]     XR abdomen 1 view kub    Result Date: 6/22/2022  Narrative: ABDOMEN INDICATION:   persistant emesis after sx, sbo  COMPARISON:  None VIEWS:  AP supine FINDINGS: 3 x-rays of the abdomen were obtained  NG tube is seen extending into the distal stomach  Persistent bilateral particularly left-sided perihilar and basilar infiltrate possibly associated with bilateral pleural effusion  No pneumoperitoneum is visualized  Nonspecific abdominal gas pattern is visualized including air-filled dilated small bowel loops in this context of small bowel obstruction Degenerative changes is seen within the spine  Impression: NG tube in correct location  Persistent bilateral particularly left-sided pleuroparenchymal changes Persistent dilated small bowel loops in this context of small bowel obstruction Workstation performed: [id number]     XR abdomen 1 view kub    Result Date: 6/13/2022  Narrative: ABDOMEN INDICATION:   Bowel gas pattern  Follow contrast  SBO    COMPARISON:  Abdominal radiograph from June 9, 2022 VIEWS:  AP supine FINDINGS: Limited examination due to the patient's size   There has been decrease in the extent of bowel distention since the last exam   Enteric contrast now opacifies colon from cecum to distal descending colon  No discernible free air on this supine study  Upright or left lateral decubitus imaging is more sensitive to detect subtle free air in the appropriate setting  No pathologic calcifications or soft tissue masses  Visualized lung bases are clear  Visualized osseous structures are unremarkable for the patient's age  Impression: 1  Markedly limited examination due to the patient's size  2   Decrease in the degree of bowel distention since 6/9/2022  3   Enteric contrast is now located in the colon, extending from the cecum to the distal descending colon  Workstation performed: [id number]     XR abdomen obstruction series    Result Date: 6/9/2022  Narrative: OBSTRUCTION SERIES INDICATION:   SBO, follow up oral contrast  COMPARISON:  Chest x-ray performed the previous day  EXAM PERFORMED/VIEWS:  XR ABDOMEN OBSTRUCTION SERIES 10 FINDINGS: This study is limited due to the patient's large body habitus  The distal tip of the enteric tube is in the left upper quadrant of the abdomen in the stomach  There are persistent distended loops of small bowel in the left abdomen consistent with known small bowel obstruction  There is oral contrast within nondistended colon  A retrievable IVC filter is noted  No free air beneath the hemidiaphragms  No pathologic calcifications or soft tissue masses evident  Osseous structures are unremarkable  Examination of the chest reveals a normal cardiomediastinal silhouette  Lungs are clear  Impression: Persistent partial small bowel obstruction  Workstation performed: [id number]     XR abdomen obstruction series    Result Date: 6/9/2022  Narrative: OBSTRUCTION SERIES INDICATION:   SBO  COMPARISON:  Abdominal radiographs dated 6/6/2022  CT abdomen/pelvis dated 6/4/2022   EXAM PERFORMED/VIEWS:  XR ABDOMEN OBSTRUCTION SERIES FINDINGS: Markedly technically limited examination due to body habitus and underpenetration  Tip of enteric tube overlies the stomach  Persistently dilated small bowel loops in the left hemiabdomen  Oral contrast has progressed to the right colon  No definite air in the rectum  IVC filter noted  Impression: Technically limited examination due to body habitus and underpenetration  Persistently dilated small bowel loops in the left hemiabdomen in this patient with known small bowel obstruction  Contrast has progressed to the right colon  No definite air in the rectum  Workstation performed: [id number]     XR abdomen obstruction series    Result Date: 6/6/2022  Narrative: OBSTRUCTION SERIES INDICATION:   Small bowel obstruction  Abdominal pain  COMPARISON:  June 5, 2022 EXAM PERFORMED/VIEWS:  XR ABDOMEN OBSTRUCTION SERIES FINDINGS: Enteric tube present, tip overlying left upper quadrant of the abdomen  Examination is limited secondary to patient body habitus and underpenetration  Gaseous distention of a portion of the stomach is noted  Cholecystectomy clips noted  IVC filter noted  No evidence for acute osseous abnormality  Impression: Enteric tube in expected location, tip beneath left hemidiaphragm  Workstation performed: [id number]     XR abdomen obstruction series    Result Date: 6/5/2022  Narrative: OBSTRUCTION SERIES INDICATION:   eval sbo progress  COMPARISON:  Abdomen and pelvic CT from 6/4/2022  EXAM PERFORMED/VIEWS:  XR ABDOMEN OBSTRUCTION SERIES FINDINGS: Nasogastric tube in the stomach  Unchanged gastric and small bowel distention consistent with small bowel obstruction  No free air beneath the hemidiaphragms  No pathologic calcifications or soft tissue masses evident  IVC filter noted  Osseous structures are unremarkable  Examination of the chest reveals a normal cardiomediastinal silhouette  Lungs are clear  Impression: Unchanged gastric and small bowel distention consistent with small bowel obstruction   Workstation performed: [id number]     FL barium swallow video w speech    Result Date: 6/23/2022  Narrative: A video barium swallow study was performed by the Department of Speech Pathology  Please refer to the report for the official interpretation  The images are stored for archival purposes only  Study images were not formally reviewed by the Radiology Department  XR abdomen 1 vw portable    Result Date: 6/29/2022  Narrative: ABDOMEN INDICATION:   NGT placement  COMPARISON:  None VIEWS:  AP supine FINDINGS: Repositioned enteric tube tip is in the region of the proximal stomach  Oral contrast in the stomach and proximal small bowel  No dilated bowel, fluid levels or free air identified  Visualized lung bases are clear  Stable cardiomediastinal silhouette  No acute osseous pathology  IVC filter  Impression: Enteric tube in the stomach  Workstation performed: [id number]     XR chest portable ICU    Result Date: 6/19/2022  Narrative: CHEST INDICATION:   Eval for volume overload, pleural effusion  COMPARISON:  Chest radiographs June 17, 2022, May 25, 2022, and April 3, 2022  CT chest October 13, 2016  EXAM PERFORMED/VIEWS:  XR CHEST PORTABLE ICU FINDINGS:  Image quality limited by patient body habitus and low lung volumes  Enteric tube terminates below the diaphragm, endotracheal tube satisfactorily positioned 4 5 cm above the cornelio  Right internal jugular central venous catheter terminates in expected location of superior vena cava just above the right main bronchus  Moderate cardiomegaly  Pulmonary vasculature indistinct  The left hemidiaphragm remains silhouetted concerning for consolidation the left lung base raising the possibility of pneumonia  Osseous structures appear within normal limits for patient age  Impression: 1  Image quality limited but suspicious for pneumonia in the left lung base unchanged since the most recent prior study  Superimposed CHF would also be a consideration   2   Lines and tubes in satisfactory position  Workstation performed: [id number]     XR chest portable ICU    Result Date: 6/17/2022  Narrative: CHEST INDICATION:   R/o PTX  COMPARISON:  6/17/2022 EXAM PERFORMED/VIEWS:  XR CHEST PORTABLE ICU FINDINGS:  ET tube tip at the level of the clavicles  NG tube tip below the diaphragm  Right IJ approach central venous catheter seen with its tip junction of the SVC and right atrium  Right upper extremity approach PICC line seen with its tip at the junction of the SVC and right atrium  Heart shadow is enlarged and there is pulmonary vascular congestion concerning for CHF  Questioned left basilar infiltrate  Liza Lanza No pneumothorax or pleural effusion  Osseous structures appear within normal limits for patient age  Impression: Questioned left basilar infiltrate  Liza Lanza Heart shadow is enlarged and there is pulmonary vascular congestion concerning for CHF  Workstation performed: [id number]     XR chest portable ICU    Result Date: 6/17/2022  Narrative: CHEST INDICATION:   Decreased TV, increased peak pressures and secretions, eval for PTX and consolidaiton  COMPARISON:  Chest radiograph June 13, 2022 EXAM PERFORMED/VIEWS:  XR CHEST PORTABLE ICU FINDINGS:  Tip of endotracheal tube is approximately 5 5 cm above the cornelio  Distal end of enteric tube is beneath the diaphragm; tip is excluded from field-of-view Heart shadow is enlarged but unchanged from prior exam  Prominence of the interstitial markings  No definitive consolidation or pleural effusion  No pneumothorax  Impression: Pulmonary vascular congestion without a definitive consolidation or effusion  No pneumothorax  Workstation performed: [id number]     [address] [facility] bedside procedure    Result Date: 6/18/2022  Narrative: 1 2 [id number] 2 323 [id number] 2     bedside procedure    Result Date: 6/14/2022  Narrative: 1 2 [id number]  9 [id number]  0 [id number]    IR PICC line placement double lumen    Result Date: 6/13/2022  Narrative: Peripherally inserted central venous catheter Clinical History: IV access  Fluoroscopy time: 0 minutes  Findings: A fluoroscopic spot view of the chest was obtained demonstrating a 5 [place] double-lumen PICC line terminating at the RA/SVC junction  The procedure was performed by the interventional radiology staff  All elements of maximal sterile barrier technique, cap and mask and sterile gown and sterile gloves and sterile full-body drape and hand hygiene and 2% chlorhexidine for cutaneous antisepsis  Sterile ultrasound technique with sterile gel and sterile probe covers was also utilized  Impression: Impression: PICC line centrally positioned  Workstation performed: [id number]       EKG personally reviewed by Maday Em MD      Counseling / Coordination of Care  Total floor / unit time spent today 30 minutes  Greater than 50% of total time was spent with the patient and / or family counseling and / or coordination of care

## 2022-06-30 NOTE — ANESTHESIA PREPROCEDURE EVALUATION
Procedure:  SKIN GRAFT SPLIT THICKNESS (STSG)  TRUNK (N/A Abdomen)    Hx of Afib with RVR    Hx of HFpEF - Echo 2022  EF 55%, inadequate for evaluation    BMI 70 with hx of obesity hypoventilation syndrome  On nightly BiPAP  COPD currently on 2L NC    Relevant Problems   CARDIO   (+) Atrial fibrillation with RVR (HCC)   (+) Atrial fibrillation with rapid ventricular response (HCC)   (+) Hypertension      ENDO   (+) Hypothyroidism      GI/HEPATIC   (+) SBO      NEURO/PSYCH   (+) Depression   (+) History of DVT (deep vein thrombosis)      PULMONARY   (+) Acute respiratory failure (HCC)   (+) COPD (chronic obstructive pulmonary disease) (HCC)   (+) Chronic respiratory failure with hypoxia (HCC)        Physical Exam    Airway    Mallampati score: IV  TM Distance: >3 FB  Neck ROM: full     Dental       Cardiovascular  Rhythm: regular, Rate: normal, Cardiovascular exam normal    Pulmonary  Pulmonary exam normal Breath sounds clear to auscultation,     Other Findings  Multiple missing/cracked teeth with remaining in poor state of disrepair  Anesthesia Plan  ASA Score- 4     Anesthesia Type- general with ASA Monitors  Additional Monitors:   Airway Plan: ETT  Comment: Risks/benefits and alternatives discussed with patient including likely possibility of PONV and sore throat, as well as the rare possibilities of aspiration, dental/oropharyngeal/ocular injuries, or grave/life threatening anesthetic and surgical emergencies          Plan Factors-Exercise tolerance (METS): <4 METS  Patient summary reviewed  Patient instructed to abstain from smoking on day of procedure  Patient did not smoke on day of surgery  Induction- intravenous  Postoperative Plan- Plan for postoperative opioid use  Planned trial extubation    Informed Consent- Anesthetic plan and risks discussed with patient  I personally reviewed this patient with the CRNA   Discussed and agreed on the Anesthesia Plan with the MORRIS Bo

## 2022-07-01 LAB
ANION GAP SERPL CALCULATED.3IONS-SCNC: 3 MMOL/L (ref 4–13)
APTT PPP: 78 SECONDS (ref 23–37)
APTT PPP: 81 SECONDS (ref 23–37)
BUN SERPL-MCNC: 21 MG/DL (ref 5–25)
CALCIUM SERPL-MCNC: 8.8 MG/DL (ref 8.3–10.1)
CHLORIDE SERPL-SCNC: 93 MMOL/L (ref 100–108)
CO2 SERPL-SCNC: 36 MMOL/L (ref 21–32)
CREAT SERPL-MCNC: 0.61 MG/DL (ref 0.6–1.3)
ERYTHROCYTE [DISTWIDTH] IN BLOOD BY AUTOMATED COUNT: 15.1 % (ref 11.6–15.1)
GFR SERPL CREATININE-BSD FRML MDRD: 115 ML/MIN/1.73SQ M
GLUCOSE SERPL-MCNC: 164 MG/DL (ref 65–140)
GLUCOSE SERPL-MCNC: 167 MG/DL (ref 65–140)
GLUCOSE SERPL-MCNC: 169 MG/DL (ref 65–140)
GLUCOSE SERPL-MCNC: 187 MG/DL (ref 65–140)
HCT VFR BLD AUTO: 27.9 % (ref 36.5–49.3)
HGB BLD-MCNC: 8.6 G/DL (ref 12–17)
MAGNESIUM SERPL-MCNC: 2.5 MG/DL (ref 1.6–2.6)
MCH RBC QN AUTO: 29.4 PG (ref 26.8–34.3)
MCHC RBC AUTO-ENTMCNC: 30.8 G/DL (ref 31.4–37.4)
MCV RBC AUTO: 95 FL (ref 82–98)
PHOSPHATE SERPL-MCNC: 2.7 MG/DL (ref 2.7–4.5)
PLATELET # BLD AUTO: 229 THOUSANDS/UL (ref 149–390)
PMV BLD AUTO: 11.2 FL (ref 8.9–12.7)
POTASSIUM SERPL-SCNC: 4.8 MMOL/L (ref 3.5–5.3)
RBC # BLD AUTO: 2.93 MILLION/UL (ref 3.88–5.62)
SODIUM SERPL-SCNC: 132 MMOL/L (ref 136–145)
WBC # BLD AUTO: 9.36 THOUSAND/UL (ref 4.31–10.16)

## 2022-07-01 PROCEDURE — 99024 POSTOP FOLLOW-UP VISIT: CPT | Performed by: SURGERY

## 2022-07-01 PROCEDURE — 85730 THROMBOPLASTIN TIME PARTIAL: CPT | Performed by: SURGERY

## 2022-07-01 PROCEDURE — 80048 BASIC METABOLIC PNL TOTAL CA: CPT

## 2022-07-01 PROCEDURE — 94660 CPAP INITIATION&MGMT: CPT

## 2022-07-01 PROCEDURE — 85027 COMPLETE CBC AUTOMATED: CPT

## 2022-07-01 PROCEDURE — 94760 N-INVAS EAR/PLS OXIMETRY 1: CPT

## 2022-07-01 PROCEDURE — 82948 REAGENT STRIP/BLOOD GLUCOSE: CPT

## 2022-07-01 PROCEDURE — 94640 AIRWAY INHALATION TREATMENT: CPT

## 2022-07-01 PROCEDURE — 99232 SBSQ HOSP IP/OBS MODERATE 35: CPT | Performed by: INTERNAL MEDICINE

## 2022-07-01 PROCEDURE — 83735 ASSAY OF MAGNESIUM: CPT

## 2022-07-01 PROCEDURE — 84100 ASSAY OF PHOSPHORUS: CPT

## 2022-07-01 RX ADMIN — Medication 20 MG: at 06:17

## 2022-07-01 RX ADMIN — INSULIN LISPRO 1 UNITS: 100 INJECTION, SOLUTION INTRAVENOUS; SUBCUTANEOUS at 12:37

## 2022-07-01 RX ADMIN — GABAPENTIN 300 MG: 250 SOLUTION ORAL at 08:31

## 2022-07-01 RX ADMIN — QUETIAPINE FUMARATE 50 MG: 25 TABLET ORAL at 22:05

## 2022-07-01 RX ADMIN — INSULIN LISPRO 1 UNITS: 100 INJECTION, SOLUTION INTRAVENOUS; SUBCUTANEOUS at 18:15

## 2022-07-01 RX ADMIN — ACETAMINOPHEN 650 MG: 650 SUSPENSION ORAL at 18:13

## 2022-07-01 RX ADMIN — OXYCODONE HYDROCHLORIDE 10 MG: 5 SOLUTION ORAL at 06:12

## 2022-07-01 RX ADMIN — METOROPROLOL TARTRATE 2.5 MG: 5 INJECTION, SOLUTION INTRAVENOUS at 23:01

## 2022-07-01 RX ADMIN — METOROPROLOL TARTRATE 2.5 MG: 5 INJECTION, SOLUTION INTRAVENOUS at 18:13

## 2022-07-01 RX ADMIN — LEVALBUTEROL HYDROCHLORIDE 1.25 MG: 1.25 SOLUTION, CONCENTRATE RESPIRATORY (INHALATION) at 08:10

## 2022-07-01 RX ADMIN — TORSEMIDE 40 MG: 20 TABLET ORAL at 08:28

## 2022-07-01 RX ADMIN — INSULIN LISPRO 1 UNITS: 100 INJECTION, SOLUTION INTRAVENOUS; SUBCUTANEOUS at 06:11

## 2022-07-01 RX ADMIN — ACETAMINOPHEN 650 MG: 650 SUSPENSION ORAL at 06:12

## 2022-07-01 RX ADMIN — TORSEMIDE 40 MG: 20 TABLET ORAL at 22:09

## 2022-07-01 RX ADMIN — METOROPROLOL TARTRATE 2.5 MG: 5 INJECTION, SOLUTION INTRAVENOUS at 10:14

## 2022-07-01 RX ADMIN — LEVALBUTEROL HYDROCHLORIDE 1.25 MG: 1.25 SOLUTION, CONCENTRATE RESPIRATORY (INHALATION) at 14:27

## 2022-07-01 RX ADMIN — ACETAMINOPHEN 650 MG: 650 SUSPENSION ORAL at 10:13

## 2022-07-01 RX ADMIN — SENNOSIDES AND DOCUSATE SODIUM 1 TABLET: 8.6; 5 TABLET ORAL at 18:13

## 2022-07-01 RX ADMIN — GABAPENTIN 300 MG: 250 SOLUTION ORAL at 18:13

## 2022-07-01 RX ADMIN — LEVALBUTEROL HYDROCHLORIDE 1.25 MG: 1.25 SOLUTION, CONCENTRATE RESPIRATORY (INHALATION) at 19:27

## 2022-07-01 RX ADMIN — IPRATROPIUM BROMIDE 0.5 MG: 0.5 SOLUTION RESPIRATORY (INHALATION) at 14:27

## 2022-07-01 RX ADMIN — DIGOXIN 125 MCG: 0.25 INJECTION INTRAMUSCULAR; INTRAVENOUS at 08:29

## 2022-07-01 RX ADMIN — OXYCODONE HYDROCHLORIDE 5 MG: 5 SOLUTION ORAL at 10:15

## 2022-07-01 RX ADMIN — LEVOTHYROXINE SODIUM 25 MCG: 25 TABLET ORAL at 06:12

## 2022-07-01 RX ADMIN — HEPARIN SODIUM 21 UNITS/KG/HR: 10000 INJECTION, SOLUTION INTRAVENOUS at 12:45

## 2022-07-01 RX ADMIN — GABAPENTIN 300 MG: 250 SOLUTION ORAL at 22:13

## 2022-07-01 RX ADMIN — IPRATROPIUM BROMIDE 0.5 MG: 0.5 SOLUTION RESPIRATORY (INHALATION) at 08:10

## 2022-07-01 RX ADMIN — POLYETHYLENE GLYCOL 3350 17 G: 17 POWDER, FOR SOLUTION ORAL at 08:28

## 2022-07-01 RX ADMIN — Medication: at 21:02

## 2022-07-01 RX ADMIN — ACETAMINOPHEN 650 MG: 650 SUSPENSION ORAL at 23:01

## 2022-07-01 RX ADMIN — HEPARIN SODIUM 21 UNITS/KG/HR: 10000 INJECTION, SOLUTION INTRAVENOUS at 23:10

## 2022-07-01 RX ADMIN — SPIRONOLACTONE 50 MG: 50 TABLET ORAL at 08:28

## 2022-07-01 RX ADMIN — BUDESONIDE AND FORMOTEROL FUMARATE DIHYDRATE 2 PUFF: 160; 4.5 AEROSOL RESPIRATORY (INHALATION) at 10:14

## 2022-07-01 RX ADMIN — IPRATROPIUM BROMIDE 0.5 MG: 0.5 SOLUTION RESPIRATORY (INHALATION) at 19:27

## 2022-07-01 RX ADMIN — HEPARIN SODIUM 21 UNITS/KG/HR: 10000 INJECTION, SOLUTION INTRAVENOUS at 03:06

## 2022-07-01 RX ADMIN — SENNOSIDES AND DOCUSATE SODIUM 1 TABLET: 8.6; 5 TABLET ORAL at 08:28

## 2022-07-01 NOTE — NUTRITION
07/01/22 1306   Adequacy of Intake   Nutrition Modality NPO;PN   Feeding Route   Tube Feeding NG-tube (re-inserted 6/29)   TPN Prescription 750ml D50%, 900ml AA15%, 75ml 20%lipids   Current PO Intake   Estimated calorie intake compared to estimated need Current PN meeting needs at this time   Recommendations/Interventions   Adult BMI Classifications Morbid Obesity > 70   Interventions/Recommendations Adjust EN/ PN   Recommendations to Provider Per prev RD recs (6/28) "Suggest adjust TPN to: 900 ml 15% aa, 600 ml 50% dex, 250 ml 20% lipids (2060 kcal, 135 gms protein, 1750 ml tv)  Monitor electrolytes and Triglycerides  "

## 2022-07-01 NOTE — PLAN OF CARE
Problem: Prexisting or High Potential for Compromised Skin Integrity  Goal: Skin integrity is maintained or improved  Description: INTERVENTIONS:  - Identify patients at risk for skin breakdown  - Assess and monitor skin integrity  - Assess and monitor nutrition and hydration status  - Monitor labs   - Assess for incontinence   - Turn and reposition patient  - Assist with mobility/ambulation  - Relieve pressure over bony prominences  - Avoid friction and shearing  - Provide appropriate hygiene as needed including keeping skin clean and dry  - Evaluate need for skin moisturizer/barrier cream  - Collaborate with interdisciplinary team   - Patient/family teaching  - Consider wound care consult   Outcome: Progressing     Problem: Potential for Falls  Goal: Patient will remain free of falls  Description: INTERVENTIONS:  - Educate patient/family on patient safety including physical limitations  - Instruct patient to call for assistance with activity   - Consult OT/PT to assist with strengthening/mobility   - Keep Call bell within reach  - Keep bed low and locked with side rails adjusted as appropriate  - Keep care items and personal belongings within reach  - Initiate and maintain comfort rounds  - Make Fall Risk Sign visible to staff  - Offer Toileting every 1 Hours, in advance of need  - Initiate/Maintain alarm  - Obtain necessary fall risk management equipment  - Apply yellow socks and bracelet for high fall risk patients  - Consider moving patient to room near nurses station  Outcome: Progressing     Problem: MOBILITY - ADULT  Goal: Maintain or return to baseline ADL function  Description: INTERVENTIONS:  -  Assess patient's ability to carry out ADLs; assess patient's baseline for ADL function and identify physical deficits which impact ability to perform ADLs (bathing, care of mouth/teeth, toileting, grooming, dressing, etc )  - Assess/evaluate cause of self-care deficits   - Assess range of motion  - Assess patient's mobility; develop plan if impaired  - Assess patient's need for assistive devices and provide as appropriate  - Encourage maximum independence but intervene and supervise when necessary  - Involve family in performance of ADLs  - Assess for home care needs following discharge   - Consider OT consult to assist with ADL evaluation and planning for discharge  - Provide patient education as appropriate  Outcome: Progressing  Goal: Maintains/Returns to pre admission functional level  Description: INTERVENTIONS:  - Perform BMAT or MOVE assessment daily    - Set and communicate daily mobility goal to care team and patient/family/caregiver  - Collaborate with rehabilitation services on mobility goals if consulted  - Perform Range of Motion 3 times a day  - Reposition patient every 2 hours  - Dangle patient 3 times a day  - Stand patient 3 times a day  - Ambulate patient 3 times a day  - Out of bed to chair 3 times a day   - Out of bed for meals 3 times a day  - Out of bed for toileting  - Record patient progress and toleration of activity level   Outcome: Progressing     Problem: Nutrition/Hydration-ADULT  Goal: Nutrient/Hydration intake appropriate for improving, restoring or maintaining nutritional needs  Description: Monitor and assess patient's nutrition/hydration status for malnutrition  Collaborate with interdisciplinary team and initiate plan and interventions as ordered  Monitor patient's weight and dietary intake as ordered or per policy  Utilize nutrition screening tool and intervene as necessary  Determine patient's food preferences and provide high-protein, high-caloric foods as appropriate       INTERVENTIONS:  - Monitor oral intake, urinary output, labs, and treatment plans  - Assess nutrition and hydration status and recommend course of action  - Evaluate amount of meals eaten  - Assist patient with eating if necessary   - Allow adequate time for meals  - Recommend/ encourage appropriate diets, oral nutritional supplements, and vitamin/mineral supplements  - Order, calculate, and assess calorie counts as needed  - Recommend, monitor, and adjust tube feedings and TPN/PPN based on assessed needs  - Assess need for intravenous fluids  - Provide specific nutrition/hydration education as appropriate  - Include patient/family/caregiver in decisions related to nutrition  Outcome: Progressing     Problem: PAIN - ADULT  Goal: Verbalizes/displays adequate comfort level or baseline comfort level  Description: Interventions:  - Encourage patient to monitor pain and request assistance  - Assess pain using appropriate pain scale  - Administer analgesics based on type and severity of pain and evaluate response  - Implement non-pharmacological measures as appropriate and evaluate response  - Consider cultural and social influences on pain and pain management  - Notify physician/advanced practitioner if interventions unsuccessful or patient reports new pain  Outcome: Progressing     Problem: INFECTION - ADULT  Goal: Absence or prevention of progression during hospitalization  Description: INTERVENTIONS:  - Assess and monitor for signs and symptoms of infection  - Monitor lab/diagnostic results  - Monitor all insertion sites, i e  indwelling lines, tubes, and drains  - Monitor endotracheal if appropriate and nasal secretions for changes in amount and color  - [place] appropriate cooling/warming therapies per order  - Administer medications as ordered  - Instruct and encourage patient and family to use good hand hygiene technique  - Identify and instruct in appropriate isolation precautions for identified infection/condition  Outcome: Progressing     Problem: DISCHARGE PLANNING  Goal: Discharge to home or other facility with appropriate resources  Description: INTERVENTIONS:  - Identify barriers to discharge w/patient and caregiver  - Arrange for needed discharge resources and transportation as appropriate  - Identify discharge learning needs (meds, wound care, etc )  - Arrange for interpretive services to assist at discharge as needed  - Refer to Case Management Department for coordinating discharge planning if the patient needs post-hospital services based on physician/advanced practitioner order or complex needs related to functional status, cognitive ability, or social support system  Outcome: Progressing     Problem: Knowledge Deficit  Goal: Patient/family/caregiver demonstrates understanding of disease process, treatment plan, medications, and discharge instructions  Description: Complete learning assessment and assess knowledge base    Interventions:  - Provide teaching at level of understanding  - Provide teaching via preferred learning methods  Outcome: Progressing

## 2022-07-01 NOTE — QUICK NOTE
QUICK NOTE - Deterioration Index  Ruthie Loomis 47 y o  male MRN: [id number]  Unit/Bed#: [facility] 898-84 Encounter: [id number]    Date Paged: 22  Time Paged: [facility]  Room #: 448  Arrival Time: 0704  Deterioration index score at time of page: 61 34  %  Spoke with Geetha Espinosa RN from primary team  Need to escalate level of care: no     PROBLEMS resulting in high DI score:   37% Respiratory rate 34   18% Supplemental oxygen Nasal cannula   15 Age 47years old   8% Systolic 96       PLAN:     Assessed patient bedside - RR in 20s, comfortable, in no distress on 3L NC     Please contact critical care via [employer] with any questions or concerns       Vitals:   Vitals:    22 2303 22 2343 22 0222 22 0601   BP:  97/52 94/52 96/59   Pulse:  93 76 95   Resp:   (!) 34    Temp:   (!) 96 9 °F (36 1 °C)    TempSrc:       SpO2: 97% 95% 97% 97%   Weight:       Height:           Respiratory:  SpO2: SpO2: 97 %, SpO2 Activity: SpO2 Activity: At Rest, SpO2 Device: O2 Device: Nasal cannula  Nasal Cannula O2 Flow Rate (L/min): 2 L/min    Temperature: Temp (24hrs), Av °F (36 7 °C), Min:96 4 °F (35 8 °C), Max:100 °F (37 8 °C)  Current: Temperature: (!) 96 9 °F (36 1 °C)    Labs:   Results from last 7 days   Lab Units 22  0559 22  0512 22  0537   WBC Thousand/uL 9 36 9 15 8 95   HEMOGLOBIN g/dL 8 6* 8 7* 8 7*   HEMATOCRIT % 27 9* 28 8* 28 2*   PLATELETS Thousands/uL 229 247 249   MONO PCT %  --   --  7     Results from last 7 days   Lab Units 22  0559 22  0512 22  0537   SODIUM mmol/L 132* 132* 134*   POTASSIUM mmol/L 4 8 3 8 4 0   CHLORIDE mmol/L 93* 91* 91*   CO2 mmol/L 36* 36* 39*   BUN mg/dL 21 20 17   CREATININE mg/dL 0 61 0 70 0 73   CALCIUM mg/dL 8 8 8 9 8 8     Results from last 7 days   Lab Units 22  0559 22  0512 22  0542   MAGNESIUM mg/dL 2 5 2 3 2 3                   Code Status: Level 1 - Full Code

## 2022-07-01 NOTE — PLAN OF CARE
Problem: Prexisting or High Potential for Compromised Skin Integrity  Goal: Skin integrity is maintained or improved  Description: INTERVENTIONS:  - Identify patients at risk for skin breakdown  - Assess and monitor skin integrity  - Assess and monitor nutrition and hydration status  - Monitor labs   - Assess for incontinence   - Turn and reposition patient  - Assist with mobility/ambulation  - Relieve pressure over bony prominences  - Avoid friction and shearing  - Provide appropriate hygiene as needed including keeping skin clean and dry  - Evaluate need for skin moisturizer/barrier cream  - Collaborate with interdisciplinary team   - Patient/family teaching  - Consider wound care consult   Outcome: Progressing     Problem: Potential for Falls  Goal: Patient will remain free of falls  Description: INTERVENTIONS:  - Educate patient/family on patient safety including physical limitations  - Instruct patient to call for assistance with activity   - Consult OT/PT to assist with strengthening/mobility   - Keep Call bell within reach  - Keep bed low and locked with side rails adjusted as appropriate  - Keep care items and personal belongings within reach  - Initiate and maintain comfort rounds  - Make Fall Risk Sign visible to staff  - Offer Toileting every 1 Hours, in advance of need  - Initiate/Maintain alarm  - Obtain necessary fall risk management equipment  - Apply yellow socks and bracelet for high fall risk patients  - Consider moving patient to room near nurses station  Outcome: Progressing     Problem: MOBILITY - ADULT  Goal: Maintain or return to baseline ADL function  Description: INTERVENTIONS:  -  Assess patient's ability to carry out ADLs; assess patient's baseline for ADL function and identify physical deficits which impact ability to perform ADLs (bathing, care of mouth/teeth, toileting, grooming, dressing, etc )  - Assess/evaluate cause of self-care deficits   - Assess range of motion  - Assess patient's mobility; develop plan if impaired  - Assess patient's need for assistive devices and provide as appropriate  - Encourage maximum independence but intervene and supervise when necessary  - Involve family in performance of ADLs  - Assess for home care needs following discharge   - Consider OT consult to assist with ADL evaluation and planning for discharge  - Provide patient education as appropriate  Outcome: Progressing  Goal: Maintains/Returns to pre admission functional level  Description: INTERVENTIONS:  - Perform BMAT or MOVE assessment daily    - Set and communicate daily mobility goal to care team and patient/family/caregiver  - Collaborate with rehabilitation services on mobility goals if consulted  - Perform Range of Motion 3 times a day  - Reposition patient every 2 hours  - Dangle patient 3 times a day  - Stand patient 3 times a day  - Ambulate patient 3 times a day  - Out of bed to chair 3 times a day   - Out of bed for meals 3 times a day  - Out of bed for toileting  - Record patient progress and toleration of activity level   Outcome: Progressing     Problem: Nutrition/Hydration-ADULT  Goal: Nutrient/Hydration intake appropriate for improving, restoring or maintaining nutritional needs  Description: Monitor and assess patient's nutrition/hydration status for malnutrition  Collaborate with interdisciplinary team and initiate plan and interventions as ordered  Monitor patient's weight and dietary intake as ordered or per policy  Utilize nutrition screening tool and intervene as necessary  Determine patient's food preferences and provide high-protein, high-caloric foods as appropriate       INTERVENTIONS:  - Monitor oral intake, urinary output, labs, and treatment plans  - Assess nutrition and hydration status and recommend course of action  - Evaluate amount of meals eaten  - Assist patient with eating if necessary   - Allow adequate time for meals  - Recommend/ encourage appropriate diets, oral nutritional supplements, and vitamin/mineral supplements  - Order, calculate, and assess calorie counts as needed  - Recommend, monitor, and adjust tube feedings and TPN/PPN based on assessed needs  - Assess need for intravenous fluids  - Provide specific nutrition/hydration education as appropriate  - Include patient/family/caregiver in decisions related to nutrition  Outcome: Progressing     Problem: PAIN - ADULT  Goal: Verbalizes/displays adequate comfort level or baseline comfort level  Description: Interventions:  - Encourage patient to monitor pain and request assistance  - Assess pain using appropriate pain scale  - Administer analgesics based on type and severity of pain and evaluate response  - Implement non-pharmacological measures as appropriate and evaluate response  - Consider cultural and social influences on pain and pain management  - Notify physician/advanced practitioner if interventions unsuccessful or patient reports new pain  Outcome: Progressing     Problem: INFECTION - ADULT  Goal: Absence or prevention of progression during hospitalization  Description: INTERVENTIONS:  - Assess and monitor for signs and symptoms of infection  - Monitor lab/diagnostic results  - Monitor all insertion sites, i e  indwelling lines, tubes, and drains  - Monitor endotracheal if appropriate and nasal secretions for changes in amount and color  - [place] appropriate cooling/warming therapies per order  - Administer medications as ordered  - Instruct and encourage patient and family to use good hand hygiene technique  - Identify and instruct in appropriate isolation precautions for identified infection/condition  Outcome: Progressing     Problem: DISCHARGE PLANNING  Goal: Discharge to home or other facility with appropriate resources  Description: INTERVENTIONS:  - Identify barriers to discharge w/patient and caregiver  - Arrange for needed discharge resources and transportation as appropriate  - Identify discharge learning needs (meds, wound care, etc )  - Arrange for interpretive services to assist at discharge as needed  - Refer to Case Management Department for coordinating discharge planning if the patient needs post-hospital services based on physician/advanced practitioner order or complex needs related to functional status, cognitive ability, or social support system  Outcome: Progressing     Problem: Knowledge Deficit  Goal: Patient/family/caregiver demonstrates understanding of disease process, treatment plan, medications, and discharge instructions  Description: Complete learning assessment and assess knowledge base    Interventions:  - Provide teaching at level of understanding  - Provide teaching via preferred learning methods  Outcome: Progressing

## 2022-07-01 NOTE — PROGRESS NOTES
Cardiology Progress Note - Karan Hannah 47 y o  male MRN: [id number]    Unit/Bed#: [facility] 668-50 Encounter: [id number]      Assessment:  Principal Problem:    SBO  Active Problems:    Atrial fibrillation with RVR (HCC)    COPD (chronic obstructive pulmonary disease) (HCC)    Acute respiratory failure (HCC)      Plan:  Patient continues with NG tube in place  Telemetry demonstrates atrial fibrillation with moderate ventricular response  Continuing on intravenous metoprolol and digoxin  BMP today with potassium of 4 8 and creatinine of 0 61  Continues on intravenous heparin  Will continue current cardiac regimen  Subjective:   Patient seen and examined  Objective:     Vitals: Blood pressure 99/58, pulse 90, temperature 97 8 °F (36 6 °C), resp  rate 20, height 5' 11" (1 803 m), weight (!) 230 kg (506 lb 2 8 oz), SpO2 96 %  , Body mass index is 70 6 kg/m² ,   Orthostatic Blood Pressures    Flowsheet Row Most Recent Value   Blood Pressure 99/58 filed at 07/01/2022 0806   Patient Position - Orthostatic VS Lying filed at 06/29/2022 0731      ,      Intake/Output Summary (Last 24 hours) at 7/1/2022 0913  Last data filed at 7/1/2022 0601  Gross per 24 hour   Intake 2767 14 ml   Output 1330 ml   Net 1437 14 ml       No significant arrhythmias seen on telemetry review  Physical Exam:    GEN: Karan Hannah   NECK: supple, no carotid bruits, no JVD or HJR  HEART: normal rate, regular rhythm, normal S1 and S2, no murmurs, clicks, gallops or rubs   LUNGS: clear to auscultation bilaterally; no wheezes, rales, or rhonchi   EXTREMITIES: edema  SKIN: warm and well perfused, no suspicious lesions on exposed skin    Labs & Results:    No results displayed because visit has over 200 results            CT abdomen pelvis wo contrast    Result Date: 6/4/2022  Narrative: CT ABDOMEN AND PELVIS WITHOUT IV CONTRAST INDICATION:   Bowel obstruction suspected Abdominal pain, acute, nonlocalized diffuse abdominal pain and vomiting, Hx of obstruction    COMPARISON:  None  TECHNIQUE:  CT examination of the abdomen and pelvis was performed without intravenous contrast  This examination was performed without intravenous contrast in the context of the critical nationwide Omnipaque shortage  Axial, sagittal, and coronal 2D reformatted images were created from the source data and submitted for interpretation  Radiation dose length product (DLP) for this visit:  2495 6 mGy-cm   This examination, like all CT scans performed in the [facility], was performed utilizing techniques to minimize radiation dose exposure, including the use of iterative  reconstruction and automated exposure control  Enteric contrast was administered  FINDINGS: ABDOMEN LOWER CHEST:  No clinically significant abnormality identified in the visualized lower chest  LIVER/BILIARY TREE:  Unremarkable  GALLBLADDER:  No calcified gallstones  No pericholecystic inflammatory change  SPLEEN:  Unremarkable  PANCREAS:  Unremarkable  ADRENAL GLANDS:  Unremarkable  KIDNEYS/URETERS:  Unremarkable  No hydronephrosis  STOMACH AND BOWEL:  Markedly distended duodenum and proximal jejunal up to 6 5 cm with abrupt transition in the left abdomen (series 201, image 52)  APPENDIX:  No findings to suggest appendicitis  ABDOMINOPELVIC CAVITY:  No ascites  No pneumoperitoneum  No lymphadenopathy  VESSELS:  Unremarkable for patient's age  PELVIS REPRODUCTIVE ORGANS:  Unremarkable for patient's age  URINARY BLADDER:  Unremarkable  ABDOMINAL WALL/INGUINAL REGIONS:  Large ventral abdominal wall hernia containing numerous loops of small and large bowel  OSSEOUS STRUCTURES:  No acute fracture or destructive osseous lesion  Impression: Findings consistent with small bowel obstruction at the proximal jejunum with transition point at the left upper abdomen   Findings discussed with Dr Melly Guzman at [address] [facility] AM, 6/4/2022 Workstation performed: [id number]     XR chest portable    Result Date: 6/24/2022  Narrative: CHEST INDICATION:   Concern for aspiration  COMPARISON:  June 21, 2022 EXAM PERFORMED/VIEWS:  XR CHEST PORTABLE Single image FINDINGS:  Diminished lung volumes  Mild cardiomegaly  Diffuse interstitial prominence with peribronchial thickening and vascular congestion  Enteric tube in: The stomach  Right PICC line catheter can be followed to the mid SVC, tip is not seen  Numerous EKG leads in place  The lungs are clear  No pneumothorax or pleural effusion  Osseous structures appear within normal limits for patient age  Impression: Diminished lung volumes  Pulmonary edema exaggerated by vascular crowding  Workstation performed: [id number]     XR chest portable    Result Date: 6/22/2022  Narrative: CHEST INDICATION:   vomiting/aspiration rapid response  COMPARISON:  6/18/2022 EXAM PERFORMED/VIEWS:  XR CHEST PORTABLE FINDINGS: Interval removal of the ET tube is visualized  Interval removal of the right-sided central line is visualized  Persistent NG tube is seen extending into the mid stomach  Persistent right-sided PICC line is seen extending into the superior vena cava  Limited examination due to low lung volume and rotation of the patient  There is persistent bilateral particularly left perihilar and basilar infiltrate  Associated bilateral pleural effusion cannot be excluded  No pneumothorax is visualized  Unchanged cardiac silhouette is visualized  Impression: Interval removal of the ET tube and right-sided central line with persistent NG tube and right-sided PICC line Unchanged bilateral particularly left-sided airspace disease identified Workstation performed: [id number]     XR chest portable    Result Date: 6/13/2022  Narrative: CHEST INDICATION:   line placement  COMPARISON:  May 25, 2022 EXAM PERFORMED/VIEWS:  XR CHEST PORTABLE FINDINGS:  A nasogastric tube has been placed    It extends below the diaphragm but its tip is not included on this exam   A right-sided PICC line is present with its tip in the expected location of the superior vena cava  The heart is enlarged  The pulmonary vessels are distended  Evaluation of the lungs is limited by patient size  There appear to be hazy opacities in both lungs  No evidence of consolidation  There is no evidence of pleural effusion or pneumothorax  Osseous structures appear within normal limits for patient age  Impression: 1  Tip of right-sided PICC line in superior vena cava  2   3   Nasogastric tube present although tip not included on this exam  4   Pulmonary vascular congestion and suggestion of mild pulmonary edema  Workstation performed: [id number]     XR abdomen 1 view kub    Result Date: 6/30/2022  Narrative: ABDOMEN INDICATION:   Need to assess for progression of contrast given earlier today for CT scan with PO contrast  COMPARISON:  Abdominal radiograph June 29, 2022 at 12:49 VIEWS:  AP supine FINDINGS: Tip of enteric tube projects over the stomach  Single short segment contrast-filled loop of small bowel is dilated to 5 cm in the central abdomen, likely within the distal duodenum  Enteric contrast opacifies the nondistended ascending, transverse, and proximal descending colon  Mildly dilated loops of air-filled colon in the lower left abdomen  Impression: Contrast has passed into the proximal descending colon  Mildly dilated loops of air-filled distal colon  A distal obstruction is not excluded, and follow-up radiograph could be considered at this time to confirm progression of contrast  The study was marked in [facility] for immediate notification  Workstation performed: [id number]     XR abdomen 1 view kub    Result Date: 6/27/2022  Narrative: ABDOMEN INDICATION:  NGT placement  COMPARISON:  Abdominal radiographs 6/21/2022, CT abdomen and pelvis 6/4/2022 VIEWS:  AP semierect-portable Images: 1 *(centered over the thoracoabdominal junction as per protocol for this indication);  The lower abdomen and pelvis were excluded from the field-of-view  FINDINGS: Enteric tube courses to the stomach, the distal tip is looped back upon itself terminating in the region of the distal esophagus  Nonspecific bowel gas pattern  Contrast material seen within central small bowel loop  No discernible free air on this limited portable study  No pathologic calcifications or soft tissue masses as visualized  Visualized lung bases are grossly clear  Visualized osseous structures are unremarkable for the patient's age  Impression: Enteric tube tip looped upon itself terminating in the region of the distal esophagus  Repositioning advised  Nonspecific bowel gas pattern  The study was marked in [facility] for immediate notification  Workstation performed: [id number]     XR abdomen 1 view kub    Result Date: 6/22/2022  Narrative: ABDOMEN INDICATION:   persistant emesis after sx, sbo  COMPARISON:  None VIEWS:  AP supine FINDINGS: 3 x-rays of the abdomen were obtained  NG tube is seen extending into the distal stomach  Persistent bilateral particularly left-sided perihilar and basilar infiltrate possibly associated with bilateral pleural effusion  No pneumoperitoneum is visualized  Nonspecific abdominal gas pattern is visualized including air-filled dilated small bowel loops in this context of small bowel obstruction Degenerative changes is seen within the spine  Impression: NG tube in correct location  Persistent bilateral particularly left-sided pleuroparenchymal changes Persistent dilated small bowel loops in this context of small bowel obstruction Workstation performed: [id number]     XR abdomen 1 view kub    Result Date: 6/13/2022  Narrative: ABDOMEN INDICATION:   Bowel gas pattern  Follow contrast  SBO    COMPARISON:  Abdominal radiograph from June 9, 2022 VIEWS:  AP supine FINDINGS: Limited examination due to the patient's size   There has been decrease in the extent of bowel distention since the last exam   Enteric contrast now opacifies colon from cecum to distal descending colon  No discernible free air on this supine study  Upright or left lateral decubitus imaging is more sensitive to detect subtle free air in the appropriate setting  No pathologic calcifications or soft tissue masses  Visualized lung bases are clear  Visualized osseous structures are unremarkable for the patient's age  Impression: 1  Markedly limited examination due to the patient's size  2   Decrease in the degree of bowel distention since 6/9/2022  3   Enteric contrast is now located in the colon, extending from the cecum to the distal descending colon  Workstation performed: [id number]     XR abdomen obstruction series    Result Date: 6/9/2022  Narrative: OBSTRUCTION SERIES INDICATION:   SBO, follow up oral contrast  COMPARISON:  Chest x-ray performed the previous day  EXAM PERFORMED/VIEWS:  XR ABDOMEN OBSTRUCTION SERIES 10 FINDINGS: This study is limited due to the patient's large body habitus  The distal tip of the enteric tube is in the left upper quadrant of the abdomen in the stomach  There are persistent distended loops of small bowel in the left abdomen consistent with known small bowel obstruction  There is oral contrast within nondistended colon  A retrievable IVC filter is noted  No free air beneath the hemidiaphragms  No pathologic calcifications or soft tissue masses evident  Osseous structures are unremarkable  Examination of the chest reveals a normal cardiomediastinal silhouette  Lungs are clear  Impression: Persistent partial small bowel obstruction  Workstation performed: [id number]     XR abdomen obstruction series    Result Date: 6/9/2022  Narrative: OBSTRUCTION SERIES INDICATION:   SBO  COMPARISON:  Abdominal radiographs dated 6/6/2022  CT abdomen/pelvis dated 6/4/2022  EXAM PERFORMED/VIEWS:  XR ABDOMEN OBSTRUCTION SERIES FINDINGS: Markedly technically limited examination due to body habitus and underpenetration   Tip of enteric tube overlies the stomach  Persistently dilated small bowel loops in the left hemiabdomen  Oral contrast has progressed to the right colon  No definite air in the rectum  IVC filter noted  Impression: Technically limited examination due to body habitus and underpenetration  Persistently dilated small bowel loops in the left hemiabdomen in this patient with known small bowel obstruction  Contrast has progressed to the right colon  No definite air in the rectum  Workstation performed: [id number]     XR abdomen obstruction series    Result Date: 6/6/2022  Narrative: OBSTRUCTION SERIES INDICATION:   Small bowel obstruction  Abdominal pain  COMPARISON:  June 5, 2022 EXAM PERFORMED/VIEWS:  XR ABDOMEN OBSTRUCTION SERIES FINDINGS: Enteric tube present, tip overlying left upper quadrant of the abdomen  Examination is limited secondary to patient body habitus and underpenetration  Gaseous distention of a portion of the stomach is noted  Cholecystectomy clips noted  IVC filter noted  No evidence for acute osseous abnormality  Impression: Enteric tube in expected location, tip beneath left hemidiaphragm  Workstation performed: [id number]     XR abdomen obstruction series    Result Date: 6/5/2022  Narrative: OBSTRUCTION SERIES INDICATION:   eval sbo progress  COMPARISON:  Abdomen and pelvic CT from 6/4/2022  EXAM PERFORMED/VIEWS:  XR ABDOMEN OBSTRUCTION SERIES FINDINGS: Nasogastric tube in the stomach  Unchanged gastric and small bowel distention consistent with small bowel obstruction  No free air beneath the hemidiaphragms  No pathologic calcifications or soft tissue masses evident  IVC filter noted  Osseous structures are unremarkable  Examination of the chest reveals a normal cardiomediastinal silhouette  Lungs are clear  Impression: Unchanged gastric and small bowel distention consistent with small bowel obstruction   Workstation performed: [id number]     FL barium swallow video w speech    Result Date: 6/23/2022  Narrative: OCTAVIO video barium swallow study was performed by the Department of Speech Pathology  Please refer to the report for the official interpretation  The images are stored for archival purposes only  Study images were not formally reviewed by the Radiology Department  XR abdomen 1 vw portable    Result Date: 6/29/2022  Narrative: ABDOMEN INDICATION:   NGT placement  COMPARISON:  None VIEWS:  AP supine FINDINGS: Repositioned enteric tube tip is in the region of the proximal stomach  Oral contrast in the stomach and proximal small bowel  No dilated bowel, fluid levels or free air identified  Visualized lung bases are clear  Stable cardiomediastinal silhouette  No acute osseous pathology  IVC filter  Impression: Enteric tube in the stomach  Workstation performed: [id number]     XR chest portable ICU    Result Date: 6/19/2022  Narrative: CHEST INDICATION:   Eval for volume overload, pleural effusion  COMPARISON:  Chest radiographs June 17, 2022, May 25, 2022, and April 3, 2022  CT chest October 13, 2016  EXAM PERFORMED/VIEWS:  XR CHEST PORTABLE ICU FINDINGS:  Image quality limited by patient body habitus and low lung volumes  Enteric tube terminates below the diaphragm, endotracheal tube satisfactorily positioned 4 5 cm above the cornelio  Right internal jugular central venous catheter terminates in expected location of superior vena cava just above the right main bronchus  Moderate cardiomegaly  Pulmonary vasculature indistinct  The left hemidiaphragm remains silhouetted concerning for consolidation the left lung base raising the possibility of pneumonia  Osseous structures appear within normal limits for patient age  Impression: 1  Image quality limited but suspicious for pneumonia in the left lung base unchanged since the most recent prior study  Superimposed CHF would also be a consideration  2   Lines and tubes in satisfactory position   Workstation performed: [id number]     XR chest portable ICU    Result Date: 6/17/2022  Narrative: CHEST INDICATION:   R/o PTX  COMPARISON:  6/17/2022 EXAM PERFORMED/VIEWS:  XR CHEST PORTABLE ICU FINDINGS:  ET tube tip at the level of the clavicles  NG tube tip below the diaphragm  Right IJ approach central venous catheter seen with its tip junction of the SVC and right atrium  Right upper extremity approach PICC line seen with its tip at the junction of the SVC and right atrium  Heart shadow is enlarged and there is pulmonary vascular congestion concerning for CHF  Questioned left basilar infiltrate  Gerhardt Sep No pneumothorax or pleural effusion  Osseous structures appear within normal limits for patient age  Impression: Questioned left basilar infiltrate  Gerhardt Sep Heart shadow is enlarged and there is pulmonary vascular congestion concerning for CHF  Workstation performed: [id number]     XR chest portable ICU    Result Date: 6/17/2022  Narrative: CHEST INDICATION:   Decreased TV, increased peak pressures and secretions, eval for PTX and consolidaiton  COMPARISON:  Chest radiograph June 13, 2022 EXAM PERFORMED/VIEWS:  XR CHEST PORTABLE ICU FINDINGS:  Tip of endotracheal tube is approximately 5 5 cm above the cornelio  Distal end of enteric tube is beneath the diaphragm; tip is excluded from field-of-view Heart shadow is enlarged but unchanged from prior exam  Prominence of the interstitial markings  No definitive consolidation or pleural effusion  No pneumothorax  Impression: Pulmonary vascular congestion without a definitive consolidation or effusion  No pneumothorax  Workstation performed: [id number]     [id number] [facility] bedside procedure    Result Date: 6/18/2022  Narrative: 1 2 [id number] 2 323 [id number] 2     bedside procedure    Result Date: 6/14/2022  Narrative: 1 2 840 [id number]  8 [id number]  9 [id number] 1000    IR PICC line placement double lumen    Result Date: 6/13/2022  Narrative: Peripherally inserted central venous catheter Clinical History: IV access   Fluoroscopy time: 0 minutes  Findings: A fluoroscopic spot view of the chest was obtained demonstrating a 5 Syriac double-lumen PICC line terminating at the RA/SVC junction  The procedure was performed by the interventional radiology staff  All elements of maximal sterile barrier technique, cap and mask and sterile gown and sterile gloves and sterile full-body drape and hand hygiene and 2% chlorhexidine for cutaneous antisepsis  Sterile ultrasound technique with sterile gel and sterile probe covers was also utilized  Impression: Impression: PICC line centrally positioned  Workstation performed: [id number]       EKG personally reviewed by Nilesh Rubi MD      Counseling / Coordination of Care  Total floor / unit time spent today 30 minutes  Greater than 50% of total time was spent with the patient and / or family counseling and / or coordination of care

## 2022-07-01 NOTE — QUICK NOTE
Post- OP Note - GeneralSurgery   Ellie Hall 47 y o  male MRN: [id number]  Unit/Bed#: [facility] 607-01 Encounter: [id number]    Assessment:  47 y o  male Day of Surgery s/p Procedure(s) (LRB):  incision and drainage, wash out vac change (N/A)  CHANGE DRESSING/VAC ABDOMEN (N/A)       Plan:   NGT/NPO   TPN   I/OS   pulm toilet   Please Tigertext on call Surgery resident with any questions    Subjective/Objective     Subjective:   Patient alert and oriented  No chest pains or shortness of breath  Objective:    Blood pressure 109/54, pulse 91, temperature 97 6 °F (36 4 °C), resp  rate 18, height 5' 11" (1 803 m), weight (!) 230 kg (506 lb 2 8 oz), SpO2 96 %  ,Body mass index is 70 6 kg/m²  Physical Exam:   Gen: NAD  HEENT: MMM  CV: well perfused  Lungs: Normal respiratory effort on 2L NC  Abd: soft, appropriately tender, nondistended, vac cdi  Skin: warm/ dry  Neuro:  AxO x3

## 2022-07-02 LAB
ANION GAP SERPL CALCULATED.3IONS-SCNC: 2 MMOL/L (ref 4–13)
APTT PPP: 60 SECONDS (ref 23–37)
BUN SERPL-MCNC: 27 MG/DL (ref 5–25)
CA-I BLD-SCNC: 1.06 MMOL/L (ref 1.12–1.32)
CALCIUM SERPL-MCNC: 8.9 MG/DL (ref 8.3–10.1)
CHLORIDE SERPL-SCNC: 91 MMOL/L (ref 100–108)
CO2 SERPL-SCNC: 41 MMOL/L (ref 21–32)
CREAT SERPL-MCNC: 0.73 MG/DL (ref 0.6–1.3)
ERYTHROCYTE [DISTWIDTH] IN BLOOD BY AUTOMATED COUNT: 15.4 % (ref 11.6–15.1)
GFR SERPL CREATININE-BSD FRML MDRD: 107 ML/MIN/1.73SQ M
GLUCOSE SERPL-MCNC: 122 MG/DL (ref 65–140)
GLUCOSE SERPL-MCNC: 128 MG/DL (ref 65–140)
GLUCOSE SERPL-MCNC: 133 MG/DL (ref 65–140)
GLUCOSE SERPL-MCNC: 134 MG/DL (ref 65–140)
GLUCOSE SERPL-MCNC: 146 MG/DL (ref 65–140)
GLUCOSE SERPL-MCNC: 174 MG/DL (ref 65–140)
HCT VFR BLD AUTO: 26.9 % (ref 36.5–49.3)
HGB BLD-MCNC: 8.2 G/DL (ref 12–17)
MAGNESIUM SERPL-MCNC: 2.2 MG/DL (ref 1.6–2.6)
MCH RBC QN AUTO: 28.8 PG (ref 26.8–34.3)
MCHC RBC AUTO-ENTMCNC: 30.5 G/DL (ref 31.4–37.4)
MCV RBC AUTO: 94 FL (ref 82–98)
PHOSPHATE SERPL-MCNC: 3.3 MG/DL (ref 2.7–4.5)
PLATELET # BLD AUTO: 243 THOUSANDS/UL (ref 149–390)
PMV BLD AUTO: 11.4 FL (ref 8.9–12.7)
POTASSIUM SERPL-SCNC: 3.6 MMOL/L (ref 3.5–5.3)
RBC # BLD AUTO: 2.85 MILLION/UL (ref 3.88–5.62)
SODIUM SERPL-SCNC: 134 MMOL/L (ref 136–145)
TRIGL SERPL-MCNC: 185 MG/DL
WBC # BLD AUTO: 7 THOUSAND/UL (ref 4.31–10.16)

## 2022-07-02 PROCEDURE — 82948 REAGENT STRIP/BLOOD GLUCOSE: CPT

## 2022-07-02 PROCEDURE — 85730 THROMBOPLASTIN TIME PARTIAL: CPT | Performed by: SURGERY

## 2022-07-02 PROCEDURE — 94660 CPAP INITIATION&MGMT: CPT

## 2022-07-02 PROCEDURE — 82330 ASSAY OF CALCIUM: CPT | Performed by: PHYSICIAN ASSISTANT

## 2022-07-02 PROCEDURE — 99024 POSTOP FOLLOW-UP VISIT: CPT | Performed by: STUDENT IN AN ORGANIZED HEALTH CARE EDUCATION/TRAINING PROGRAM

## 2022-07-02 PROCEDURE — 99232 SBSQ HOSP IP/OBS MODERATE 35: CPT | Performed by: INTERNAL MEDICINE

## 2022-07-02 PROCEDURE — 80048 BASIC METABOLIC PNL TOTAL CA: CPT | Performed by: STUDENT IN AN ORGANIZED HEALTH CARE EDUCATION/TRAINING PROGRAM

## 2022-07-02 PROCEDURE — 85027 COMPLETE CBC AUTOMATED: CPT | Performed by: STUDENT IN AN ORGANIZED HEALTH CARE EDUCATION/TRAINING PROGRAM

## 2022-07-02 PROCEDURE — 97606 NEG PRS WND THER DME>50 SQCM: CPT | Performed by: STUDENT IN AN ORGANIZED HEALTH CARE EDUCATION/TRAINING PROGRAM

## 2022-07-02 PROCEDURE — 94640 AIRWAY INHALATION TREATMENT: CPT

## 2022-07-02 PROCEDURE — 83735 ASSAY OF MAGNESIUM: CPT | Performed by: STUDENT IN AN ORGANIZED HEALTH CARE EDUCATION/TRAINING PROGRAM

## 2022-07-02 PROCEDURE — 84478 ASSAY OF TRIGLYCERIDES: CPT | Performed by: STUDENT IN AN ORGANIZED HEALTH CARE EDUCATION/TRAINING PROGRAM

## 2022-07-02 PROCEDURE — 94760 N-INVAS EAR/PLS OXIMETRY 1: CPT

## 2022-07-02 PROCEDURE — 84100 ASSAY OF PHOSPHORUS: CPT | Performed by: STUDENT IN AN ORGANIZED HEALTH CARE EDUCATION/TRAINING PROGRAM

## 2022-07-02 RX ORDER — METOCLOPRAMIDE HYDROCHLORIDE 5 MG/ML
10 INJECTION INTRAMUSCULAR; INTRAVENOUS EVERY 6 HOURS SCHEDULED
Status: DISCONTINUED | OUTPATIENT
Start: 2022-07-02 | End: 2022-07-18

## 2022-07-02 RX ORDER — MINERAL OIL 100 G/100G
1 OIL RECTAL ONCE
Status: COMPLETED | OUTPATIENT
Start: 2022-07-02 | End: 2022-07-02

## 2022-07-02 RX ORDER — CALCIUM GLUCONATE 20 MG/ML
1 INJECTION, SOLUTION INTRAVENOUS ONCE
Status: COMPLETED | OUTPATIENT
Start: 2022-07-02 | End: 2022-07-02

## 2022-07-02 RX ADMIN — METOROPROLOL TARTRATE 2.5 MG: 5 INJECTION, SOLUTION INTRAVENOUS at 12:24

## 2022-07-02 RX ADMIN — Medication: at 21:46

## 2022-07-02 RX ADMIN — TORSEMIDE 40 MG: 20 TABLET ORAL at 20:14

## 2022-07-02 RX ADMIN — GABAPENTIN 300 MG: 250 SOLUTION ORAL at 17:47

## 2022-07-02 RX ADMIN — METOROPROLOL TARTRATE 2.5 MG: 5 INJECTION, SOLUTION INTRAVENOUS at 18:39

## 2022-07-02 RX ADMIN — HEPARIN SODIUM 21 UNITS/KG/HR: 10000 INJECTION, SOLUTION INTRAVENOUS at 10:25

## 2022-07-02 RX ADMIN — BISACODYL 10 MG: 10 SUPPOSITORY RECTAL at 08:27

## 2022-07-02 RX ADMIN — GABAPENTIN 300 MG: 250 SOLUTION ORAL at 08:08

## 2022-07-02 RX ADMIN — SENNOSIDES AND DOCUSATE SODIUM 1 TABLET: 8.6; 5 TABLET ORAL at 08:03

## 2022-07-02 RX ADMIN — LEVOTHYROXINE SODIUM 25 MCG: 25 TABLET ORAL at 05:45

## 2022-07-02 RX ADMIN — ACETAMINOPHEN 650 MG: 650 SUSPENSION ORAL at 23:46

## 2022-07-02 RX ADMIN — METOROPROLOL TARTRATE 2.5 MG: 5 INJECTION, SOLUTION INTRAVENOUS at 05:45

## 2022-07-02 RX ADMIN — LEVALBUTEROL HYDROCHLORIDE 1.25 MG: 1.25 SOLUTION, CONCENTRATE RESPIRATORY (INHALATION) at 14:46

## 2022-07-02 RX ADMIN — Medication 20 MG: at 05:45

## 2022-07-02 RX ADMIN — LEVALBUTEROL HYDROCHLORIDE 1.25 MG: 1.25 SOLUTION, CONCENTRATE RESPIRATORY (INHALATION) at 08:40

## 2022-07-02 RX ADMIN — CALCIUM GLUCONATE 1 G: 20 INJECTION, SOLUTION INTRAVENOUS at 08:14

## 2022-07-02 RX ADMIN — POLYETHYLENE GLYCOL 3350 17 G: 17 POWDER, FOR SOLUTION ORAL at 08:05

## 2022-07-02 RX ADMIN — IPRATROPIUM BROMIDE 0.5 MG: 0.5 SOLUTION RESPIRATORY (INHALATION) at 19:27

## 2022-07-02 RX ADMIN — BUDESONIDE AND FORMOTEROL FUMARATE DIHYDRATE 2 PUFF: 160; 4.5 AEROSOL RESPIRATORY (INHALATION) at 20:16

## 2022-07-02 RX ADMIN — OXYCODONE HYDROCHLORIDE 10 MG: 5 SOLUTION ORAL at 17:50

## 2022-07-02 RX ADMIN — BUDESONIDE AND FORMOTEROL FUMARATE DIHYDRATE 2 PUFF: 160; 4.5 AEROSOL RESPIRATORY (INHALATION) at 08:15

## 2022-07-02 RX ADMIN — MINERAL OIL 1 ENEMA: 118 ENEMA RECTAL at 07:56

## 2022-07-02 RX ADMIN — HYDROMORPHONE HYDROCHLORIDE 1 MG: 1 INJECTION, SOLUTION INTRAMUSCULAR; INTRAVENOUS; SUBCUTANEOUS at 18:56

## 2022-07-02 RX ADMIN — LEVALBUTEROL HYDROCHLORIDE 1.25 MG: 1.25 SOLUTION, CONCENTRATE RESPIRATORY (INHALATION) at 19:27

## 2022-07-02 RX ADMIN — IPRATROPIUM BROMIDE 0.5 MG: 0.5 SOLUTION RESPIRATORY (INHALATION) at 14:46

## 2022-07-02 RX ADMIN — QUETIAPINE FUMARATE 50 MG: 25 TABLET ORAL at 21:46

## 2022-07-02 RX ADMIN — ACETAMINOPHEN 650 MG: 650 SUSPENSION ORAL at 12:24

## 2022-07-02 RX ADMIN — HEPARIN SODIUM 21.04 UNITS/KG/HR: 10000 INJECTION, SOLUTION INTRAVENOUS at 20:18

## 2022-07-02 RX ADMIN — DIGOXIN 125 MCG: 0.25 INJECTION INTRAMUSCULAR; INTRAVENOUS at 10:00

## 2022-07-02 RX ADMIN — ACETAMINOPHEN 650 MG: 650 SUSPENSION ORAL at 05:45

## 2022-07-02 RX ADMIN — METOCLOPRAMIDE HYDROCHLORIDE 10 MG: 5 INJECTION INTRAMUSCULAR; INTRAVENOUS at 17:47

## 2022-07-02 RX ADMIN — METOCLOPRAMIDE HYDROCHLORIDE 10 MG: 5 INJECTION INTRAMUSCULAR; INTRAVENOUS at 10:26

## 2022-07-02 RX ADMIN — IPRATROPIUM BROMIDE 0.5 MG: 0.5 SOLUTION RESPIRATORY (INHALATION) at 08:40

## 2022-07-02 RX ADMIN — INSULIN LISPRO 1 UNITS: 100 INJECTION, SOLUTION INTRAVENOUS; SUBCUTANEOUS at 00:28

## 2022-07-02 RX ADMIN — SENNOSIDES AND DOCUSATE SODIUM 1 TABLET: 8.6; 5 TABLET ORAL at 17:47

## 2022-07-02 RX ADMIN — GABAPENTIN 300 MG: 250 SOLUTION ORAL at 21:46

## 2022-07-02 NOTE — PROGRESS NOTES
Per surgical team this am, plan for NGT to be clamped all day unless pt c/o nausea, then can put back on LCS  Clamped started at 0600 after early morning meds   Will monitor closely

## 2022-07-02 NOTE — PROCEDURES
Acute Care Surgery  Bedside V A C  Procedure Note    A timeout was performed with the patient's nurse, Alfredo Landeros, prior to beginning the dressing change  Dr Madalyn Bower was present to confirm the correct dressing counts on removal of the VAC dressing and was debriefed at the completion of the dressing change  Patient was premedicated with PRN analgesia prior to procedure  Location of wound: midline abdomen    Dressings and Foam removed:  0  Dressings  1 Black Foam  6 White Foam    Dimensions of wound: 21 cm x 30 cm x 0 5 cm    VAC dressing application:  The periwound skin was cleaned and dried  0  dressings, 1 black foam, 7 white foam were cut to size of the wound and placed into the wound  The dressings were then covered with VAC drape  The track pad was then placed over the base of black foam  The VAC was then set to 125 mmHg low Continuous suction  The patient tolerated the procedure well and there were no complications  The patient did not require any excisional debridement during today's dressing change  VAC settings:  125 mmHg  Continuous    Wound Images: Additional Notes: The [facility] sticker placed over the dressing per protocol  Dr Madalyn Bower was present for the dressing change  This dressing change took greater than 20 minutes to complete      Porsha Valle MD  7/2/2022 7:30 PM

## 2022-07-02 NOTE — PLAN OF CARE
Problem: Prexisting or High Potential for Compromised Skin Integrity  Goal: Skin integrity is maintained or improved  Description: INTERVENTIONS:  - Identify patients at risk for skin breakdown  - Assess and monitor skin integrity  - Assess and monitor nutrition and hydration status  - Monitor labs   - Assess for incontinence   - Turn and reposition patient  - Assist with mobility/ambulation  - Relieve pressure over bony prominences  - Avoid friction and shearing  - Provide appropriate hygiene as needed including keeping skin clean and dry  - Evaluate need for skin moisturizer/barrier cream  - Collaborate with interdisciplinary team   - Patient/family teaching  - Consider wound care consult   Outcome: Progressing     Problem: Potential for Falls  Goal: Patient will remain free of falls  Description: INTERVENTIONS:  - Educate patient/family on patient safety including physical limitations  - Instruct patient to call for assistance with activity   - Consult OT/PT to assist with strengthening/mobility   - Keep Call bell within reach  - Keep bed low and locked with side rails adjusted as appropriate  - Keep care items and personal belongings within reach  - Initiate and maintain comfort rounds  - Make Fall Risk Sign visible to staff  - Offer Toileting every 1 Hours, in advance of need  - Initiate/Maintain alarm  - Obtain necessary fall risk management equipment  - Apply yellow socks and bracelet for high fall risk patients  - Consider moving patient to room near nurses station  Outcome: Progressing     Problem: MOBILITY - ADULT  Goal: Maintain or return to baseline ADL function  Description: INTERVENTIONS:  -  Assess patient's ability to carry out ADLs; assess patient's baseline for ADL function and identify physical deficits which impact ability to perform ADLs (bathing, care of mouth/teeth, toileting, grooming, dressing, etc )  - Assess/evaluate cause of self-care deficits   - Assess range of motion  - Assess patient's mobility; develop plan if impaired  - Assess patient's need for assistive devices and provide as appropriate  - Encourage maximum independence but intervene and supervise when necessary  - Involve family in performance of ADLs  - Assess for home care needs following discharge   - Consider OT consult to assist with ADL evaluation and planning for discharge  - Provide patient education as appropriate  Outcome: Progressing     Problem: Nutrition/Hydration-ADULT  Goal: Nutrient/Hydration intake appropriate for improving, restoring or maintaining nutritional needs    INTERVENTIONS:  - Monitor oral intake, urinary output, labs, and treatment plans  - Assess nutrition and hydration status and recommend course of action  - Evaluate amount of meals eaten  - Assist patient with eating if necessary   - Allow adequate time for meals  - Recommend/ encourage appropriate diets, oral nutritional supplements, and vitamin/mineral supplements  - Order, calculate, and assess calorie counts as needed  - Recommend, monitor, and adjust tube feedings and TPN/PPN based on assessed needs  - Assess need for intravenous fluids  - Provide specific nutrition/hydration education as appropriate  - Include patient/family/caregiver in decisions related to nutrition  Outcome: Progressing     Problem: PAIN - ADULT  Goal: Verbalizes/displays adequate comfort level or baseline comfort level  Description: Interventions:  - Encourage patient to monitor pain and request assistance  - Assess pain using appropriate pain scale  - Administer analgesics based on type and severity of pain and evaluate response  - Implement non-pharmacological measures as appropriate and evaluate response  - Consider cultural and social influences on pain and pain management  - Notify physician/advanced practitioner if interventions unsuccessful or patient reports new pain  Outcome: Progressing     Problem: INFECTION - ADULT  Goal: Absence or prevention of progression during hospitalization  Description: INTERVENTIONS:  - Assess and monitor for signs and symptoms of infection  - Monitor lab/diagnostic results  - Monitor all insertion sites, i e  indwelling lines, tubes, and drains  - Monitor endotracheal if appropriate and nasal secretions for changes in amount and color  - [place] appropriate cooling/warming therapies per order  - Administer medications as ordered  - Instruct and encourage patient and family to use good hand hygiene technique  - Identify and instruct in appropriate isolation precautions for identified infection/condition  Outcome: Progressing     Problem: CARDIOVASCULAR - ADULT  Goal: Maintains optimal cardiac output and hemodynamic stability  Description: INTERVENTIONS:  - Monitor I/O, vital signs and rhythm  - Monitor for S/S and trends of decreased cardiac output  - Administer and titrate ordered vasoactive medications to optimize hemodynamic stability  - Assess quality of pulses, skin color and temperature  - Assess for signs of decreased coronary artery perfusion  - Instruct patient to report change in severity of symptoms  Outcome: Progressing     Problem: CARDIOVASCULAR - ADULT  Goal: Absence of cardiac dysrhythmias or at baseline rhythm  Description: INTERVENTIONS:  - Continuous cardiac monitoring, vital signs, obtain 12 lead EKG if ordered  - Administer antiarrhythmic and heart rate control medications as ordered  - Monitor electrolytes and administer replacement therapy as ordered  Outcome: Progressing     Problem: RESPIRATORY - ADULT  Goal: Achieves optimal ventilation and oxygenation  Description: INTERVENTIONS:  - Assess for changes in respiratory status  - Assess for changes in mentation and behavior  - Position to facilitate oxygenation and minimize respiratory effort  - Oxygen administered by appropriate delivery if ordered  - Initiate smoking cessation education as indicated  - Encourage broncho-pulmonary hygiene including cough, deep breathe, Incentive Spirometry  - Assess the need for suctioning and aspirate as needed  - Assess and instruct to report SOB or any respiratory difficulty  - Respiratory Therapy support as indicated  Outcome: Progressing     Problem: GENITOURINARY - ADULT  Goal: Maintains or returns to baseline urinary function  Description: INTERVENTIONS:  - Assess urinary function  - Encourage oral fluids to ensure adequate hydration if ordered  - Administer IV fluids as ordered to ensure adequate hydration  - Administer ordered medications as needed  - Offer frequent toileting  - Follow urinary retention protocol if ordered  Outcome: Progressing

## 2022-07-02 NOTE — PROGRESS NOTES
Progress Note - General Surgery   Avinash Dietrich 47 y o  male MRN: [id number]  Unit/Bed#: [facility] 607-01 Encounter: [id number]    Assessment:  48yoM p/w SBO 2/2 chronic incarcerated ventral hernia now s/p   6/14  ex lap, HARVEY 6/14, also s/p abdominal washout, repair of serosal tears,  6/15 washout,abhera, VAC DPR  6/17 bridging vicryl mesh placement, wound vac placement   6/30 I&D, washout and vac placement    NGT -900 clear, but had 318 PO  Wound vac-250 cc  Uo-3 8L      Plan  · NPO/NGT, can remove NG with BM  · Will plan for enema today, patient is agreeable  · Start scheduled reglan  · Continue TPN  · Continue hep gtt  · Plan on bedside vac change today, will plan for STSG on Tuesday  · Appreciate Cards recs  · Pain/nausea control PRN      Subjective/Objective   Subjective:   No acute events overnight  Patient is doing well this morning  Pain is well controlled  No nausea or vomiting  Denies fevers/chills  Passing flatus no bowel movements  Objective:     Blood pressure 109/65, pulse 104, temperature 97 7 °F (36 5 °C), resp  rate 20, height 5' 11" (1 803 m), weight (!) 230 kg (506 lb 2 8 oz), SpO2 98 %  ,Body mass index is 70 6 kg/m²  Intake/Output Summary (Last 24 hours) at 7/2/2022 6074  Last data filed at 7/2/2022 0604  Gross per 24 hour   Intake 2831 01 ml   Output 4950 ml   Net -2118 99 ml       Invasive Devices  Report    Peripherally Inserted Central Catheter Line  Duration           PICC Line 46/38/96 Right Basilic 18 days          Drain  Duration           NG/OG/Enteral Tube Nasogastric Right nare 2 days                Physical Exam:   General: NAD  HENT: NCAT MMM  NG with clear output  Neck: supple, no JVD  CV: nl rate  Lungs: nl wob  No resp distress  ABD: Soft, obese, appropriately tender, nondistended  Wound vac in place with good seal  Extrem: No CCE  Neuro: AAOx3          Lab, Imaging and other studies:  I have personally reviewed pertinent lab results    , CBC:   Lab Results   Component Value Date WBC 7 00 07/02/2022    HGB 8 2 (L) 07/02/2022    HCT 26 9 (L) 07/02/2022    MCV 94 07/02/2022     07/02/2022    MCH 28 8 07/02/2022    MCHC 30 5 (L) 07/02/2022    RDW 15 4 (H) 07/02/2022    MPV 11 4 07/02/2022   , CMP:   Lab Results   Component Value Date    SODIUM 134 (L) 07/02/2022    K 3 6 07/02/2022    CL 91 (L) 07/02/2022    CO2 41 (H) 07/02/2022    BUN 27 (H) 07/02/2022    CREATININE 0 73 07/02/2022    CALCIUM 8 9 07/02/2022    EGFR 107 07/02/2022     VTE Pharmacologic Prophylaxis: heparin  VTE Mechanical Prophylaxis: sequential compression device

## 2022-07-03 ENCOUNTER — APPOINTMENT (INPATIENT)
Dept: RADIOLOGY | Facility: HOSPITAL | Age: 52
DRG: 230 | End: 2022-07-03
Payer: COMMERCIAL

## 2022-07-03 ENCOUNTER — APPOINTMENT (INPATIENT)
Dept: NON INVASIVE DIAGNOSTICS | Facility: HOSPITAL | Age: 52
DRG: 230 | End: 2022-07-03
Payer: COMMERCIAL

## 2022-07-03 LAB
ANION GAP SERPL CALCULATED.3IONS-SCNC: 7 MMOL/L (ref 4–13)
ANION GAP SERPL CALCULATED.3IONS-SCNC: 8 MMOL/L (ref 4–13)
AORTIC ROOT: 3.2 CM
APTT PPP: 75 SECONDS (ref 23–37)
ASCENDING AORTA: 3.4 CM
ATRIAL RATE: 107 BPM
BASOPHILS # BLD AUTO: 0.03 THOUSANDS/ΜL (ref 0–0.1)
BASOPHILS # BLD MANUAL: 0 THOUSAND/UL (ref 0–0.1)
BASOPHILS NFR BLD AUTO: 0 % (ref 0–1)
BASOPHILS NFR MAR MANUAL: 0 % (ref 0–1)
BUN SERPL-MCNC: 25 MG/DL (ref 5–25)
BUN SERPL-MCNC: 26 MG/DL (ref 5–25)
CALCIUM SERPL-MCNC: 8.9 MG/DL (ref 8.3–10.1)
CALCIUM SERPL-MCNC: 9.1 MG/DL (ref 8.3–10.1)
CHLORIDE SERPL-SCNC: 91 MMOL/L (ref 100–108)
CHLORIDE SERPL-SCNC: 91 MMOL/L (ref 100–108)
CO2 SERPL-SCNC: 36 MMOL/L (ref 21–32)
CO2 SERPL-SCNC: 36 MMOL/L (ref 21–32)
CREAT SERPL-MCNC: 0.72 MG/DL (ref 0.6–1.3)
CREAT SERPL-MCNC: 0.78 MG/DL (ref 0.6–1.3)
EOSINOPHIL # BLD AUTO: 0.04 THOUSAND/ΜL (ref 0–0.61)
EOSINOPHIL # BLD MANUAL: 0 THOUSAND/UL (ref 0–0.4)
EOSINOPHIL NFR BLD AUTO: 1 % (ref 0–6)
EOSINOPHIL NFR BLD MANUAL: 0 % (ref 0–6)
ERYTHROCYTE [DISTWIDTH] IN BLOOD BY AUTOMATED COUNT: 15.7 % (ref 11.6–15.1)
ERYTHROCYTE [DISTWIDTH] IN BLOOD BY AUTOMATED COUNT: 15.7 % (ref 11.6–15.1)
FRACTIONAL SHORTENING: 25 (ref 28–44)
GFR SERPL CREATININE-BSD FRML MDRD: 104 ML/MIN/1.73SQ M
GFR SERPL CREATININE-BSD FRML MDRD: 108 ML/MIN/1.73SQ M
GLUCOSE SERPL-MCNC: 117 MG/DL (ref 65–140)
GLUCOSE SERPL-MCNC: 130 MG/DL (ref 65–140)
GLUCOSE SERPL-MCNC: 140 MG/DL (ref 65–140)
GLUCOSE SERPL-MCNC: 141 MG/DL (ref 65–140)
GLUCOSE SERPL-MCNC: 160 MG/DL (ref 65–140)
HCT VFR BLD AUTO: 29.4 % (ref 36.5–49.3)
HCT VFR BLD AUTO: 30.1 % (ref 36.5–49.3)
HGB BLD-MCNC: 9.1 G/DL (ref 12–17)
HGB BLD-MCNC: 9.2 G/DL (ref 12–17)
IMM GRANULOCYTES # BLD AUTO: 0.13 THOUSAND/UL (ref 0–0.2)
IMM GRANULOCYTES NFR BLD AUTO: 2 % (ref 0–2)
INTERVENTRICULAR SEPTUM IN DIASTOLE (PARASTERNAL SHORT AXIS VIEW): 1.4 CM
INTERVENTRICULAR SEPTUM: 1.4 CM (ref 0.6–1.1)
LACTATE SERPL-SCNC: 1.7 MMOL/L (ref 0.5–2)
LEFT ATRIUM SIZE: 4.4 CM
LEFT INTERNAL DIMENSION IN SYSTOLE: 4.2 CM (ref 2.1–4)
LEFT VENTRICULAR INTERNAL DIMENSION IN DIASTOLE: 5.6 CM (ref 3.5–6)
LEFT VENTRICULAR POSTERIOR WALL IN END DIASTOLE: 1.4 CM
LEFT VENTRICULAR STROKE VOLUME: 79 ML
LVSV (TEICH): 79 ML
LYMPHOCYTES # BLD AUTO: 1.1 THOUSAND/UL (ref 0.6–4.47)
LYMPHOCYTES # BLD AUTO: 1.38 THOUSANDS/ΜL (ref 0.6–4.47)
LYMPHOCYTES # BLD AUTO: 16 % (ref 14–44)
LYMPHOCYTES NFR BLD AUTO: 20 % (ref 14–44)
MAGNESIUM SERPL-MCNC: 2.4 MG/DL (ref 1.6–2.6)
MCH RBC QN AUTO: 28.7 PG (ref 26.8–34.3)
MCH RBC QN AUTO: 29.1 PG (ref 26.8–34.3)
MCHC RBC AUTO-ENTMCNC: 30.2 G/DL (ref 31.4–37.4)
MCHC RBC AUTO-ENTMCNC: 31.3 G/DL (ref 31.4–37.4)
MCV RBC AUTO: 93 FL (ref 82–98)
MCV RBC AUTO: 95 FL (ref 82–98)
METAMYELOCYTES NFR BLD MANUAL: 1 % (ref 0–1)
MONOCYTES # BLD AUTO: 0.55 THOUSAND/UL (ref 0–1.22)
MONOCYTES # BLD AUTO: 0.68 THOUSAND/ΜL (ref 0.17–1.22)
MONOCYTES NFR BLD AUTO: 10 % (ref 4–12)
MONOCYTES NFR BLD: 8 % (ref 4–12)
MYELOCYTES NFR BLD MANUAL: 2 % (ref 0–1)
NEUTROPHILS # BLD AUTO: 4.51 THOUSANDS/ΜL (ref 1.85–7.62)
NEUTROPHILS # BLD MANUAL: 5.01 THOUSAND/UL (ref 1.85–7.62)
NEUTS SEG NFR BLD AUTO: 67 % (ref 43–75)
NEUTS SEG NFR BLD AUTO: 73 % (ref 43–75)
NRBC BLD AUTO-RTO: 1 /100 WBC (ref 0–2)
NRBC BLD AUTO-RTO: 1 /100 WBCS
PHOSPHATE SERPL-MCNC: 4 MG/DL (ref 2.7–4.5)
PLATELET # BLD AUTO: 246 THOUSANDS/UL (ref 149–390)
PLATELET # BLD AUTO: 256 THOUSANDS/UL (ref 149–390)
PLATELET BLD QL SMEAR: ADEQUATE
PMV BLD AUTO: 11 FL (ref 8.9–12.7)
PMV BLD AUTO: 11.1 FL (ref 8.9–12.7)
POLYCHROMASIA BLD QL SMEAR: PRESENT
POTASSIUM SERPL-SCNC: 3.3 MMOL/L (ref 3.5–5.3)
POTASSIUM SERPL-SCNC: 3.8 MMOL/L (ref 3.5–5.3)
QRS AXIS: 28 DEGREES
QRSD INTERVAL: 92 MS
QT INTERVAL: 280 MS
QTC INTERVAL: 395 MS
RBC # BLD AUTO: 3.16 MILLION/UL (ref 3.88–5.62)
RBC # BLD AUTO: 3.17 MILLION/UL (ref 3.88–5.62)
RBC MORPH BLD: PRESENT
SL CV PED ECHO LEFT VENTRICLE DIASTOLIC VOLUME (MOD BIPLANE) 2D: 156 ML
SL CV PED ECHO LEFT VENTRICLE SYSTOLIC VOLUME (MOD BIPLANE) 2D: 77 ML
SODIUM SERPL-SCNC: 134 MMOL/L (ref 136–145)
SODIUM SERPL-SCNC: 135 MMOL/L (ref 136–145)
T WAVE AXIS: 211 DEGREES
TR MAX PG: 24 MMHG
TR PEAK VELOCITY: 2.5 M/S
TRICUSPID VALVE PEAK REGURGITATION VELOCITY: 2.46 M/S
VENTRICULAR RATE: 120 BPM
WBC # BLD AUTO: 6.77 THOUSAND/UL (ref 4.31–10.16)
WBC # BLD AUTO: 6.86 THOUSAND/UL (ref 4.31–10.16)

## 2022-07-03 PROCEDURE — 99024 POSTOP FOLLOW-UP VISIT: CPT | Performed by: SURGERY

## 2022-07-03 PROCEDURE — 85007 BL SMEAR W/DIFF WBC COUNT: CPT | Performed by: STUDENT IN AN ORGANIZED HEALTH CARE EDUCATION/TRAINING PROGRAM

## 2022-07-03 PROCEDURE — 93005 ELECTROCARDIOGRAM TRACING: CPT

## 2022-07-03 PROCEDURE — 80048 BASIC METABOLIC PNL TOTAL CA: CPT | Performed by: SURGERY

## 2022-07-03 PROCEDURE — 85730 THROMBOPLASTIN TIME PARTIAL: CPT | Performed by: SURGERY

## 2022-07-03 PROCEDURE — 93010 ELECTROCARDIOGRAM REPORT: CPT | Performed by: INTERNAL MEDICINE

## 2022-07-03 PROCEDURE — 99232 SBSQ HOSP IP/OBS MODERATE 35: CPT | Performed by: INTERNAL MEDICINE

## 2022-07-03 PROCEDURE — 94660 CPAP INITIATION&MGMT: CPT

## 2022-07-03 PROCEDURE — 83735 ASSAY OF MAGNESIUM: CPT | Performed by: STUDENT IN AN ORGANIZED HEALTH CARE EDUCATION/TRAINING PROGRAM

## 2022-07-03 PROCEDURE — 93308 TTE F-UP OR LMTD: CPT | Performed by: INTERNAL MEDICINE

## 2022-07-03 PROCEDURE — 71045 X-RAY EXAM CHEST 1 VIEW: CPT

## 2022-07-03 PROCEDURE — 93325 DOPPLER ECHO COLOR FLOW MAPG: CPT | Performed by: INTERNAL MEDICINE

## 2022-07-03 PROCEDURE — 83605 ASSAY OF LACTIC ACID: CPT | Performed by: SURGERY

## 2022-07-03 PROCEDURE — 82948 REAGENT STRIP/BLOOD GLUCOSE: CPT

## 2022-07-03 PROCEDURE — 84100 ASSAY OF PHOSPHORUS: CPT | Performed by: STUDENT IN AN ORGANIZED HEALTH CARE EDUCATION/TRAINING PROGRAM

## 2022-07-03 PROCEDURE — 94640 AIRWAY INHALATION TREATMENT: CPT

## 2022-07-03 PROCEDURE — 93321 DOPPLER ECHO F-UP/LMTD STD: CPT | Performed by: INTERNAL MEDICINE

## 2022-07-03 PROCEDURE — 80048 BASIC METABOLIC PNL TOTAL CA: CPT | Performed by: STUDENT IN AN ORGANIZED HEALTH CARE EDUCATION/TRAINING PROGRAM

## 2022-07-03 PROCEDURE — 85027 COMPLETE CBC AUTOMATED: CPT | Performed by: STUDENT IN AN ORGANIZED HEALTH CARE EDUCATION/TRAINING PROGRAM

## 2022-07-03 PROCEDURE — 94760 N-INVAS EAR/PLS OXIMETRY 1: CPT

## 2022-07-03 PROCEDURE — 85025 COMPLETE CBC W/AUTO DIFF WBC: CPT | Performed by: SURGERY

## 2022-07-03 PROCEDURE — 93308 TTE F-UP OR LMTD: CPT

## 2022-07-03 PROCEDURE — 74018 RADEX ABDOMEN 1 VIEW: CPT

## 2022-07-03 RX ORDER — MINERAL OIL 100 G/100G
1 OIL RECTAL EVERY 8 HOURS
Status: DISCONTINUED | OUTPATIENT
Start: 2022-07-03 | End: 2022-07-03

## 2022-07-03 RX ORDER — MINERAL OIL 100 G/100G
1 OIL RECTAL ONCE
Status: COMPLETED | OUTPATIENT
Start: 2022-07-03 | End: 2022-07-03

## 2022-07-03 RX ORDER — METOPROLOL TARTRATE 5 MG/5ML
5 INJECTION INTRAVENOUS EVERY 6 HOURS
Status: DISCONTINUED | OUTPATIENT
Start: 2022-07-03 | End: 2022-07-06

## 2022-07-03 RX ORDER — POTASSIUM CHLORIDE 14.9 MG/ML
20 INJECTION INTRAVENOUS
Status: COMPLETED | OUTPATIENT
Start: 2022-07-03 | End: 2022-07-03

## 2022-07-03 RX ORDER — DIGOXIN 0.25 MG/ML
250 INJECTION INTRAMUSCULAR; INTRAVENOUS DAILY
Status: DISCONTINUED | OUTPATIENT
Start: 2022-07-04 | End: 2022-07-06

## 2022-07-03 RX ADMIN — ACETAMINOPHEN 650 MG: 650 SUSPENSION ORAL at 17:39

## 2022-07-03 RX ADMIN — INSULIN LISPRO 1 UNITS: 100 INJECTION, SOLUTION INTRAVENOUS; SUBCUTANEOUS at 17:43

## 2022-07-03 RX ADMIN — GABAPENTIN 300 MG: 250 SOLUTION ORAL at 21:50

## 2022-07-03 RX ADMIN — MINERAL OIL 1 ENEMA: 118 ENEMA RECTAL at 15:40

## 2022-07-03 RX ADMIN — ACETAMINOPHEN 650 MG: 650 SUSPENSION ORAL at 04:38

## 2022-07-03 RX ADMIN — METOCLOPRAMIDE HYDROCHLORIDE 10 MG: 5 INJECTION INTRAMUSCULAR; INTRAVENOUS at 00:04

## 2022-07-03 RX ADMIN — TORSEMIDE 40 MG: 20 TABLET ORAL at 08:04

## 2022-07-03 RX ADMIN — TORSEMIDE 40 MG: 20 TABLET ORAL at 21:51

## 2022-07-03 RX ADMIN — SENNOSIDES AND DOCUSATE SODIUM 1 TABLET: 8.6; 5 TABLET ORAL at 17:39

## 2022-07-03 RX ADMIN — IPRATROPIUM BROMIDE 0.5 MG: 0.5 SOLUTION RESPIRATORY (INHALATION) at 19:25

## 2022-07-03 RX ADMIN — POLYETHYLENE GLYCOL 3350 17 G: 17 POWDER, FOR SOLUTION ORAL at 08:03

## 2022-07-03 RX ADMIN — GABAPENTIN 300 MG: 250 SOLUTION ORAL at 17:39

## 2022-07-03 RX ADMIN — POTASSIUM CHLORIDE 20 MEQ: 14.9 INJECTION, SOLUTION INTRAVENOUS at 10:24

## 2022-07-03 RX ADMIN — BUDESONIDE AND FORMOTEROL FUMARATE DIHYDRATE 2 PUFF: 160; 4.5 AEROSOL RESPIRATORY (INHALATION) at 08:04

## 2022-07-03 RX ADMIN — METOCLOPRAMIDE HYDROCHLORIDE 10 MG: 5 INJECTION INTRAMUSCULAR; INTRAVENOUS at 06:26

## 2022-07-03 RX ADMIN — POTASSIUM CHLORIDE 20 MEQ: 14.9 INJECTION, SOLUTION INTRAVENOUS at 08:06

## 2022-07-03 RX ADMIN — QUETIAPINE FUMARATE 50 MG: 25 TABLET ORAL at 21:51

## 2022-07-03 RX ADMIN — ONDANSETRON 4 MG: 2 INJECTION INTRAMUSCULAR; INTRAVENOUS at 04:39

## 2022-07-03 RX ADMIN — METOROPROLOL TARTRATE 5 MG: 5 INJECTION, SOLUTION INTRAVENOUS at 17:39

## 2022-07-03 RX ADMIN — METOROPROLOL TARTRATE 2.5 MG: 5 INJECTION, SOLUTION INTRAVENOUS at 04:39

## 2022-07-03 RX ADMIN — HEPARIN SODIUM 21 UNITS/KG/HR: 10000 INJECTION, SOLUTION INTRAVENOUS at 17:40

## 2022-07-03 RX ADMIN — Medication: at 21:48

## 2022-07-03 RX ADMIN — BUDESONIDE AND FORMOTEROL FUMARATE DIHYDRATE 2 PUFF: 160; 4.5 AEROSOL RESPIRATORY (INHALATION) at 21:00

## 2022-07-03 RX ADMIN — DIGOXIN 125 MCG: 0.25 INJECTION INTRAMUSCULAR; INTRAVENOUS at 08:04

## 2022-07-03 RX ADMIN — IPRATROPIUM BROMIDE 0.5 MG: 0.5 SOLUTION RESPIRATORY (INHALATION) at 08:10

## 2022-07-03 RX ADMIN — HEPARIN SODIUM 21.04 UNITS/KG/HR: 10000 INJECTION, SOLUTION INTRAVENOUS at 06:31

## 2022-07-03 RX ADMIN — LEVALBUTEROL HYDROCHLORIDE 1.25 MG: 1.25 SOLUTION, CONCENTRATE RESPIRATORY (INHALATION) at 19:25

## 2022-07-03 RX ADMIN — GABAPENTIN 300 MG: 250 SOLUTION ORAL at 08:04

## 2022-07-03 RX ADMIN — METHYLNALTREXONE BROMIDE 12 MG: 12 INJECTION, SOLUTION SUBCUTANEOUS at 10:24

## 2022-07-03 RX ADMIN — LEVALBUTEROL HYDROCHLORIDE 1.25 MG: 1.25 SOLUTION, CONCENTRATE RESPIRATORY (INHALATION) at 08:10

## 2022-07-03 RX ADMIN — OXYCODONE HYDROCHLORIDE 10 MG: 5 SOLUTION ORAL at 08:05

## 2022-07-03 RX ADMIN — BISACODYL 10 MG: 10 SUPPOSITORY RECTAL at 08:05

## 2022-07-03 RX ADMIN — SENNOSIDES AND DOCUSATE SODIUM 1 TABLET: 8.6; 5 TABLET ORAL at 08:03

## 2022-07-03 RX ADMIN — POTASSIUM CHLORIDE 20 MEQ: 14.9 INJECTION, SOLUTION INTRAVENOUS at 12:50

## 2022-07-03 RX ADMIN — METOCLOPRAMIDE HYDROCHLORIDE 10 MG: 5 INJECTION INTRAMUSCULAR; INTRAVENOUS at 15:41

## 2022-07-03 RX ADMIN — METOROPROLOL TARTRATE 2.5 MG: 5 INJECTION, SOLUTION INTRAVENOUS at 10:24

## 2022-07-03 RX ADMIN — Medication 20 MG: at 05:10

## 2022-07-03 RX ADMIN — LEVOTHYROXINE SODIUM 25 MCG: 25 TABLET ORAL at 05:05

## 2022-07-03 RX ADMIN — SPIRONOLACTONE 50 MG: 50 TABLET ORAL at 08:04

## 2022-07-03 RX ADMIN — METOROPROLOL TARTRATE 2.5 MG: 5 INJECTION, SOLUTION INTRAVENOUS at 00:02

## 2022-07-03 NOTE — QUICK NOTE
Patient had episode of hypoxia this morning to 78% requiring increase in O2 to 8L  Patient was noted to be in afib with RVR with rates in the 130-150  EKG obtained which confirms afib w/ RVR, no heart strain appreciated  Patient was given his scheduled metoprolol early with minimal response  Given hypoxia and afib w/ RVR concern for PE  A CTA PE study was obtained, unfortanetly, patient does not fit in the CT scanner 2/2 body habitus  Fortunately the patient is already on a heparin gtt, so we will continue that at this time  We will get an ECHO to rule out heart strain  Currently O2 requirements have now decreased to 4L NC    We will reach out to cardiology for further recommendations      Negrito Montanez, DO  Surgery, PGY-3

## 2022-07-03 NOTE — PROGRESS NOTES
Progress Note - General Surgery   Harmony Chinchilla 47 y o  male MRN: [id number]  Unit/Bed#: [facility] 607-01 Encounter: [id number]    Assessment:  48yoM p/w SBO 2/2 chronic incarcerated ventral hernia now s/p   6/14  ex lap, HARVEY 6/14, also s/p abdominal washout, repair of serosal tears,  6/15 washout,abhera, VAC DPR  6/17 bridging vicryl mesh placement, wound vac placement   6/30 I&D, washout and vac placement    VSS  NGT - 350 cc  Wound vac- 100 cc  Uo - 2L    Plan  · NPO/NGT, will consider remove NG once has BM  · Will initiate relistor   · Continue scheduled reglan  · Continue TPN  · Continue hep gtt  · Plan for STSG on Tuesday  · Appreciate Cards recs  · Pain/nausea control PRN      Subjective/Objective   Subjective:   No acute overnight events  No BM after enema and suppository yesterday  States he's passing flatus  Endorses some nausea this morning  Objective:     Blood pressure 117/67, pulse (!) 110, temperature 98 1 °F (36 7 °C), resp  rate 18, height 5' 11" (1 803 m), weight (!) 223 kg (491 lb 10 oz), SpO2 96 %  ,Body mass index is 68 57 kg/m²  Intake/Output Summary (Last 24 hours) at 7/3/2022 0753  Last data filed at 7/3/2022 0701  Gross per 24 hour   Intake 2224 31 ml   Output 2850 ml   Net -625 69 ml       Invasive Devices  Report    Peripherally Inserted Central Catheter Line  Duration           PICC Line 87/43/52 Right Basilic 19 days          Drain  Duration           NG/OG/Enteral Tube Nasogastric Right nare 3 days                Physical Exam:   General - no acute distress, responsive and cooperative  CV - warm, regular rate  Pulm - normal work of breathing, no respiratory distress  Abd - soft, nontender, wound vac on to suction midline  Neuro - m/s grossly intact, cn grossly intact  Ext - moving all extremities        Lab, Imaging and other studies:  I have personally reviewed pertinent lab results    , CBC:   Lab Results   Component Value Date    WBC 6 86 07/03/2022    HGB 9 2 (L) 07/03/2022    HCT 29 4 (L) 07/03/2022    MCV 93 07/03/2022     07/03/2022    MCH 29 1 07/03/2022    MCHC 31 3 (L) 07/03/2022    RDW 15 7 (H) 07/03/2022    MPV 11 1 07/03/2022    NRBC 1 07/03/2022   , CMP:   Lab Results   Component Value Date    SODIUM 135 (L) 07/03/2022    K 3 3 (L) 07/03/2022    CL 91 (L) 07/03/2022    CO2 36 (H) 07/03/2022    BUN 25 07/03/2022    CREATININE 0 72 07/03/2022    CALCIUM 9 1 07/03/2022    EGFR 108 07/03/2022     VTE Pharmacologic Prophylaxis: heparin  VTE Mechanical Prophylaxis: sequential compression device

## 2022-07-03 NOTE — PROGRESS NOTES
Cardiology Progress Note - General Luis 47 y o  male MRN: [id number]    Unit/Bed#: [facility] 607-01 Encounter: [id number]      Assessment:  Principal Problem:    SBO  Active Problems:    Atrial fibrillation with RVR (HCC)    COPD (chronic obstructive pulmonary disease) (HCC)    Acute respiratory failure (HCC)      Plan:  Patient with no chest pain or significant dyspnea  Continues with NGT in place  Atrial fibrillation on telemetry with moderate ventricular response  On intravenous heparin and TPN  Surgical note appreciated  Hemoglobin today 9 2  Potassium 3 3  Supplementation written  Creatinine 0 72  Will continue current cardiac regimen  Subjective:   Patient seen and examined  No significant events overnight   negative  Objective:     Vitals: Blood pressure 117/67, pulse (!) 110, temperature 98 1 °F (36 7 °C), resp  rate 18, height 5' 11" (1 803 m), weight (!) 223 kg (491 lb 10 oz), SpO2 94 %  , Body mass index is 68 57 kg/m² ,   Orthostatic Blood Pressures    Flowsheet Row Most Recent Value   Blood Pressure 117/67 filed at 07/03/2022 0735   Patient Position - Orthostatic VS Lying filed at 06/29/2022 0731      ,      Intake/Output Summary (Last 24 hours) at 7/3/2022 0905  Last data filed at 7/3/2022 0701  Gross per 24 hour   Intake 2224 31 ml   Output 2850 ml   Net -625 69 ml       No significant arrhythmias seen on telemetry review  Physical Exam:    GEN: General Luis   NECK: supple, no carotid bruits, no JVD or HJR  HEART: normal rate, irregular rhythm, normal S1 and S2, no murmurs, clicks, gallops or rubs   LUNGS: clear to auscultation bilaterally; no wheezes, rales, or rhonchi   EXTREMITIES: edema  SKIN: warm and well perfused, no suspicious lesions on exposed skin    Labs & Results:    No results displayed because visit has over 200 results            CT abdomen pelvis wo contrast    Result Date: 6/4/2022  Narrative: CT ABDOMEN AND PELVIS WITHOUT IV CONTRAST INDICATION:   Bowel obstruction suspected Abdominal pain, acute, nonlocalized diffuse abdominal pain and vomiting, Hx of obstruction    COMPARISON:  None  TECHNIQUE:  CT examination of the abdomen and pelvis was performed without intravenous contrast  This examination was performed without intravenous contrast in the context of the critical nationwide Omnipaque shortage  Axial, sagittal, and coronal 2D reformatted images were created from the source data and submitted for interpretation  Radiation dose length product (DLP) for this visit:  2495 6 mGy-cm   This examination, like all CT scans performed in the [facility], was performed utilizing techniques to minimize radiation dose exposure, including the use of iterative  reconstruction and automated exposure control  Enteric contrast was administered  FINDINGS: ABDOMEN LOWER CHEST:  No clinically significant abnormality identified in the visualized lower chest  LIVER/BILIARY TREE:  Unremarkable  GALLBLADDER:  No calcified gallstones  No pericholecystic inflammatory change  SPLEEN:  Unremarkable  PANCREAS:  Unremarkable  ADRENAL GLANDS:  Unremarkable  KIDNEYS/URETERS:  Unremarkable  No hydronephrosis  STOMACH AND BOWEL:  Markedly distended duodenum and proximal jejunal up to 6 5 cm with abrupt transition in the left abdomen (series 201, image 52)  APPENDIX:  No findings to suggest appendicitis  ABDOMINOPELVIC CAVITY:  No ascites  No pneumoperitoneum  No lymphadenopathy  VESSELS:  Unremarkable for patient's age  PELVIS REPRODUCTIVE ORGANS:  Unremarkable for patient's age  URINARY BLADDER:  Unremarkable  ABDOMINAL WALL/INGUINAL REGIONS:  Large ventral abdominal wall hernia containing numerous loops of small and large bowel  OSSEOUS STRUCTURES:  No acute fracture or destructive osseous lesion  Impression: Findings consistent with small bowel obstruction at the proximal jejunum with transition point at the left upper abdomen   Findings discussed with Dr Tae Elena at [address] [facility] AM, 6/4/2022 Workstation performed: [id number]     XR chest portable    Result Date: 6/24/2022  Narrative: CHEST INDICATION:   Concern for aspiration  COMPARISON:  June 21, 2022 EXAM PERFORMED/VIEWS:  XR CHEST PORTABLE Single image FINDINGS:  Diminished lung volumes  Mild cardiomegaly  Diffuse interstitial prominence with peribronchial thickening and vascular congestion  Enteric tube in: The stomach  Right PICC line catheter can be followed to the mid SVC, tip is not seen  Numerous EKG leads in place  The lungs are clear  No pneumothorax or pleural effusion  Osseous structures appear within normal limits for patient age  Impression: Diminished lung volumes  Pulmonary edema exaggerated by vascular crowding  Workstation performed: [id number]     XR chest portable    Result Date: 6/22/2022  Narrative: CHEST INDICATION:   vomiting/aspiration rapid response  COMPARISON:  6/18/2022 EXAM PERFORMED/VIEWS:  XR CHEST PORTABLE FINDINGS: Interval removal of the ET tube is visualized  Interval removal of the right-sided central line is visualized  Persistent NG tube is seen extending into the mid stomach  Persistent right-sided PICC line is seen extending into the superior vena cava  Limited examination due to low lung volume and rotation of the patient  There is persistent bilateral particularly left perihilar and basilar infiltrate  Associated bilateral pleural effusion cannot be excluded  No pneumothorax is visualized  Unchanged cardiac silhouette is visualized  Impression: Interval removal of the ET tube and right-sided central line with persistent NG tube and right-sided PICC line Unchanged bilateral particularly left-sided airspace disease identified Workstation performed: [id number]     XR chest portable    Result Date: 6/13/2022  Narrative: CHEST INDICATION:   line placement  COMPARISON:  May 25, 2022 EXAM PERFORMED/VIEWS:  XR CHEST PORTABLE FINDINGS:  A nasogastric tube has been placed    It extends below the diaphragm but its tip is not included on this exam   A right-sided PICC line is present with its tip in the expected location of the superior vena cava  The heart is enlarged  The pulmonary vessels are distended  Evaluation of the lungs is limited by patient size  There appear to be hazy opacities in both lungs  No evidence of consolidation  There is no evidence of pleural effusion or pneumothorax  Osseous structures appear within normal limits for patient age  Impression: 1  Tip of right-sided PICC line in superior vena cava  2   3   Nasogastric tube present although tip not included on this exam  4   Pulmonary vascular congestion and suggestion of mild pulmonary edema  Workstation performed: [id number]     XR abdomen 1 view kub    Result Date: 6/30/2022  Narrative: ABDOMEN INDICATION:   Need to assess for progression of contrast given earlier today for CT scan with PO contrast  COMPARISON:  Abdominal radiograph June 29, 2022 at 12:49 VIEWS:  AP supine FINDINGS: Tip of enteric tube projects over the stomach  Single short segment contrast-filled loop of small bowel is dilated to 5 cm in the central abdomen, likely within the distal duodenum  Enteric contrast opacifies the nondistended ascending, transverse, and proximal descending colon  Mildly dilated loops of air-filled colon in the lower left abdomen  Impression: Contrast has passed into the proximal descending colon  Mildly dilated loops of air-filled distal colon  A distal obstruction is not excluded, and follow-up radiograph could be considered at this time to confirm progression of contrast  The study was marked in [facility] for immediate notification  Workstation performed: [id number]     XR abdomen 1 view kub    Result Date: 6/27/2022  Narrative: ABDOMEN INDICATION:  NGT placement   COMPARISON:  Abdominal radiographs 6/21/2022, CT abdomen and pelvis 6/4/2022 VIEWS:  AP semierect-portable Images: 1 *(centered over the thoracoabdominal junction as per protocol for this indication); The lower abdomen and pelvis were excluded from the field-of-view  FINDINGS: Enteric tube courses to the stomach, the distal tip is looped back upon itself terminating in the region of the distal esophagus  Nonspecific bowel gas pattern  Contrast material seen within central small bowel loop  No discernible free air on this limited portable study  No pathologic calcifications or soft tissue masses as visualized  Visualized lung bases are grossly clear  Visualized osseous structures are unremarkable for the patient's age  Impression: Enteric tube tip looped upon itself terminating in the region of the distal esophagus  Repositioning advised  Nonspecific bowel gas pattern  The study was marked in [facility] for immediate notification  Workstation performed: [id number]     XR abdomen 1 view kub    Result Date: 6/22/2022  Narrative: ABDOMEN INDICATION:   persistant emesis after sx, sbo  COMPARISON:  None VIEWS:  AP supine FINDINGS: 3 x-rays of the abdomen were obtained  NG tube is seen extending into the distal stomach  Persistent bilateral particularly left-sided perihilar and basilar infiltrate possibly associated with bilateral pleural effusion  No pneumoperitoneum is visualized  Nonspecific abdominal gas pattern is visualized including air-filled dilated small bowel loops in this context of small bowel obstruction Degenerative changes is seen within the spine  Impression: NG tube in correct location  Persistent bilateral particularly left-sided pleuroparenchymal changes Persistent dilated small bowel loops in this context of small bowel obstruction Workstation performed: [id number]     XR abdomen 1 view kub    Result Date: 6/13/2022  Narrative: ABDOMEN INDICATION:   Bowel gas pattern  Follow contrast  SBO    COMPARISON:  Abdominal radiograph from June 9, 2022 VIEWS:  AP supine FINDINGS: Limited examination due to the patient's size   There has been decrease in the extent of bowel distention since the last exam   Enteric contrast now opacifies colon from cecum to distal descending colon  No discernible free air on this supine study  Upright or left lateral decubitus imaging is more sensitive to detect subtle free air in the appropriate setting  No pathologic calcifications or soft tissue masses  Visualized lung bases are clear  Visualized osseous structures are unremarkable for the patient's age  Impression: 1  Markedly limited examination due to the patient's size  2   Decrease in the degree of bowel distention since 6/9/2022  3   Enteric contrast is now located in the colon, extending from the cecum to the distal descending colon  Workstation performed: [id number]     XR abdomen obstruction series    Result Date: 6/9/2022  Narrative: OBSTRUCTION SERIES INDICATION:   SBO, follow up oral contrast  COMPARISON:  Chest x-ray performed the previous day  EXAM PERFORMED/VIEWS:  XR ABDOMEN OBSTRUCTION SERIES 10 FINDINGS: This study is limited due to the patient's large body habitus  The distal tip of the enteric tube is in the left upper quadrant of the abdomen in the stomach  There are persistent distended loops of small bowel in the left abdomen consistent with known small bowel obstruction  There is oral contrast within nondistended colon  A retrievable IVC filter is noted  No free air beneath the hemidiaphragms  No pathologic calcifications or soft tissue masses evident  Osseous structures are unremarkable  Examination of the chest reveals a normal cardiomediastinal silhouette  Lungs are clear  Impression: Persistent partial small bowel obstruction  Workstation performed: [id number]     XR abdomen obstruction series    Result Date: 6/9/2022  Narrative: OBSTRUCTION SERIES INDICATION:   SBO  COMPARISON:  Abdominal radiographs dated 6/6/2022  CT abdomen/pelvis dated 6/4/2022   EXAM PERFORMED/VIEWS:  XR ABDOMEN OBSTRUCTION SERIES FINDINGS: Markedly technically limited examination due to body habitus and underpenetration  Tip of enteric tube overlies the stomach  Persistently dilated small bowel loops in the left hemiabdomen  Oral contrast has progressed to the right colon  No definite air in the rectum  IVC filter noted  Impression: Technically limited examination due to body habitus and underpenetration  Persistently dilated small bowel loops in the left hemiabdomen in this patient with known small bowel obstruction  Contrast has progressed to the right colon  No definite air in the rectum  Workstation performed: [id number]     XR abdomen obstruction series    Result Date: 6/6/2022  Narrative: OBSTRUCTION SERIES INDICATION:   Small bowel obstruction  Abdominal pain  COMPARISON:  June 5, 2022 EXAM PERFORMED/VIEWS:  XR ABDOMEN OBSTRUCTION SERIES FINDINGS: Enteric tube present, tip overlying left upper quadrant of the abdomen  Examination is limited secondary to patient body habitus and underpenetration  Gaseous distention of a portion of the stomach is noted  Cholecystectomy clips noted  IVC filter noted  No evidence for acute osseous abnormality  Impression: Enteric tube in expected location, tip beneath left hemidiaphragm  Workstation performed: [id number]     XR abdomen obstruction series    Result Date: 6/5/2022  Narrative: OBSTRUCTION SERIES INDICATION:   eval sbo progress  COMPARISON:  Abdomen and pelvic CT from 6/4/2022  EXAM PERFORMED/VIEWS:  XR ABDOMEN OBSTRUCTION SERIES FINDINGS: Nasogastric tube in the stomach  Unchanged gastric and small bowel distention consistent with small bowel obstruction  No free air beneath the hemidiaphragms  No pathologic calcifications or soft tissue masses evident  IVC filter noted  Osseous structures are unremarkable  Examination of the chest reveals a normal cardiomediastinal silhouette  Lungs are clear  Impression: Unchanged gastric and small bowel distention consistent with small bowel obstruction   Workstation performed: [id number] FL barium swallow video w speech    Result Date: 6/23/2022  Narrative: A video barium swallow study was performed by the Department of Speech Pathology  Please refer to the report for the official interpretation  The images are stored for archival purposes only  Study images were not formally reviewed by the Radiology Department  XR abdomen 1 vw portable    Result Date: 6/29/2022  Narrative: ABDOMEN INDICATION:   NGT placement  COMPARISON:  None VIEWS:  AP supine FINDINGS: Repositioned enteric tube tip is in the region of the proximal stomach  Oral contrast in the stomach and proximal small bowel  No dilated bowel, fluid levels or free air identified  Visualized lung bases are clear  Stable cardiomediastinal silhouette  No acute osseous pathology  IVC filter  Impression: Enteric tube in the stomach  Workstation performed: [id number]     XR chest portable ICU    Result Date: 6/19/2022  Narrative: CHEST INDICATION:   Eval for volume overload, pleural effusion  COMPARISON:  Chest radiographs June 17, 2022, May 25, 2022, and April 3, 2022  CT chest October 13, 2016  EXAM PERFORMED/VIEWS:  XR CHEST PORTABLE ICU FINDINGS:  Image quality limited by patient body habitus and low lung volumes  Enteric tube terminates below the diaphragm, endotracheal tube satisfactorily positioned 4 5 cm above the cornelio  Right internal jugular central venous catheter terminates in expected location of superior vena cava just above the right main bronchus  Moderate cardiomegaly  Pulmonary vasculature indistinct  The left hemidiaphragm remains silhouetted concerning for consolidation the left lung base raising the possibility of pneumonia  Osseous structures appear within normal limits for patient age  Impression: 1  Image quality limited but suspicious for pneumonia in the left lung base unchanged since the most recent prior study  Superimposed CHF would also be a consideration   2   Lines and tubes in satisfactory position  Workstation performed: [id number]     XR chest portable ICU    Result Date: 6/17/2022  Narrative: CHEST INDICATION:   R/o PTX  COMPARISON:  6/17/2022 EXAM PERFORMED/VIEWS:  XR CHEST PORTABLE ICU FINDINGS:  ET tube tip at the level of the clavicles  NG tube tip below the diaphragm  Right IJ approach central venous catheter seen with its tip junction of the SVC and right atrium  Right upper extremity approach PICC line seen with its tip at the junction of the SVC and right atrium  Heart shadow is enlarged and there is pulmonary vascular congestion concerning for CHF  Questioned left basilar infiltrate  [place] Organ No pneumothorax or pleural effusion  Osseous structures appear within normal limits for patient age  Impression: Questioned left basilar infiltrate  [place] Organ Heart shadow is enlarged and there is pulmonary vascular congestion concerning for CHF  Workstation performed: [id number]     XR chest portable ICU    Result Date: 6/17/2022  Narrative: CHEST INDICATION:   Decreased TV, increased peak pressures and secretions, eval for PTX and consolidaiton  COMPARISON:  Chest radiograph June 13, 2022 EXAM PERFORMED/VIEWS:  XR CHEST PORTABLE ICU FINDINGS:  Tip of endotracheal tube is approximately 5 5 cm above the cornelio  Distal end of enteric tube is beneath the diaphragm; tip is excluded from field-of-view Heart shadow is enlarged but unchanged from prior exam  Prominence of the interstitial markings  No definitive consolidation or pleural effusion  No pneumothorax  Impression: Pulmonary vascular congestion without a definitive consolidation or effusion  No pneumothorax  Workstation performed: [id number]     [address] [facility] bedside procedure    Result Date: 6/18/2022  Narrative: 1 2 [id number] 2 323 [id number] 2     bedside procedure    Result Date: 6/14/2022  Narrative: 1 2 840 [id number]  7 [id number]  1 [id number]    IR PICC line placement double lumen    Result Date: 6/13/2022  Narrative: Peripherally inserted central venous catheter Clinical History: IV access  Fluoroscopy time: 0 minutes  Findings: A fluoroscopic spot view of the chest was obtained demonstrating a 5 [place] double-lumen PICC line terminating at the RA/SVC junction  The procedure was performed by the interventional radiology staff  All elements of maximal sterile barrier technique, cap and mask and sterile gown and sterile gloves and sterile full-body drape and hand hygiene and 2% chlorhexidine for cutaneous antisepsis  Sterile ultrasound technique with sterile gel and sterile probe covers was also utilized  Impression: Impression: PICC line centrally positioned  Workstation performed: [id number]       EKG personally reviewed by Camryn Shepherd MD      Counseling / Coordination of Care  Total floor / unit time spent today 30 minutes  Greater than 50% of total time was spent with the patient and / or family counseling and / or coordination of care

## 2022-07-04 LAB
ANION GAP SERPL CALCULATED.3IONS-SCNC: 6 MMOL/L (ref 4–13)
APTT PPP: 131 SECONDS (ref 23–37)
APTT PPP: 43 SECONDS (ref 23–37)
BUN SERPL-MCNC: 23 MG/DL (ref 5–25)
CALCIUM SERPL-MCNC: 9 MG/DL (ref 8.3–10.1)
CHLORIDE SERPL-SCNC: 92 MMOL/L (ref 100–108)
CO2 SERPL-SCNC: 37 MMOL/L (ref 21–32)
CREAT SERPL-MCNC: 0.71 MG/DL (ref 0.6–1.3)
GFR SERPL CREATININE-BSD FRML MDRD: 108 ML/MIN/1.73SQ M
GLUCOSE SERPL-MCNC: 122 MG/DL (ref 65–140)
GLUCOSE SERPL-MCNC: 135 MG/DL (ref 65–140)
GLUCOSE SERPL-MCNC: 143 MG/DL (ref 65–140)
GLUCOSE SERPL-MCNC: 144 MG/DL (ref 65–140)
GLUCOSE SERPL-MCNC: 156 MG/DL (ref 65–140)
GLUCOSE SERPL-MCNC: 160 MG/DL (ref 65–140)
MAGNESIUM SERPL-MCNC: 2.3 MG/DL (ref 1.6–2.6)
PHOSPHATE SERPL-MCNC: 3.9 MG/DL (ref 2.7–4.5)
POTASSIUM SERPL-SCNC: 3.7 MMOL/L (ref 3.5–5.3)
SODIUM SERPL-SCNC: 135 MMOL/L (ref 136–145)

## 2022-07-04 PROCEDURE — 94640 AIRWAY INHALATION TREATMENT: CPT

## 2022-07-04 PROCEDURE — 97606 NEG PRS WND THER DME>50 SQCM: CPT | Performed by: STUDENT IN AN ORGANIZED HEALTH CARE EDUCATION/TRAINING PROGRAM

## 2022-07-04 PROCEDURE — 82948 REAGENT STRIP/BLOOD GLUCOSE: CPT

## 2022-07-04 PROCEDURE — 94660 CPAP INITIATION&MGMT: CPT

## 2022-07-04 PROCEDURE — 84100 ASSAY OF PHOSPHORUS: CPT | Performed by: SURGERY

## 2022-07-04 PROCEDURE — 94760 N-INVAS EAR/PLS OXIMETRY 1: CPT

## 2022-07-04 PROCEDURE — 80048 BASIC METABOLIC PNL TOTAL CA: CPT | Performed by: SURGERY

## 2022-07-04 PROCEDURE — 99232 SBSQ HOSP IP/OBS MODERATE 35: CPT | Performed by: INTERNAL MEDICINE

## 2022-07-04 PROCEDURE — 99024 POSTOP FOLLOW-UP VISIT: CPT | Performed by: STUDENT IN AN ORGANIZED HEALTH CARE EDUCATION/TRAINING PROGRAM

## 2022-07-04 PROCEDURE — 83735 ASSAY OF MAGNESIUM: CPT | Performed by: SURGERY

## 2022-07-04 PROCEDURE — 85730 THROMBOPLASTIN TIME PARTIAL: CPT | Performed by: SURGERY

## 2022-07-04 RX ORDER — PANTOPRAZOLE SODIUM 20 MG/1
20 TABLET, DELAYED RELEASE ORAL
Status: DISCONTINUED | OUTPATIENT
Start: 2022-07-04 | End: 2022-07-19 | Stop reason: HOSPADM

## 2022-07-04 RX ADMIN — DIGOXIN 250 MCG: 0.25 INJECTION INTRAMUSCULAR; INTRAVENOUS at 08:39

## 2022-07-04 RX ADMIN — QUETIAPINE FUMARATE 50 MG: 25 TABLET ORAL at 22:08

## 2022-07-04 RX ADMIN — ACETAMINOPHEN 650 MG: 650 SUSPENSION ORAL at 13:12

## 2022-07-04 RX ADMIN — GABAPENTIN 300 MG: 250 SOLUTION ORAL at 22:09

## 2022-07-04 RX ADMIN — HYDROMORPHONE HYDROCHLORIDE 1 MG: 1 INJECTION, SOLUTION INTRAMUSCULAR; INTRAVENOUS; SUBCUTANEOUS at 22:12

## 2022-07-04 RX ADMIN — INSULIN LISPRO 1 UNITS: 100 INJECTION, SOLUTION INTRAVENOUS; SUBCUTANEOUS at 00:11

## 2022-07-04 RX ADMIN — TORSEMIDE 40 MG: 20 TABLET ORAL at 22:08

## 2022-07-04 RX ADMIN — METOCLOPRAMIDE HYDROCHLORIDE 10 MG: 5 INJECTION INTRAMUSCULAR; INTRAVENOUS at 13:14

## 2022-07-04 RX ADMIN — PANTOPRAZOLE SODIUM 20 MG: 20 TABLET, DELAYED RELEASE ORAL at 13:16

## 2022-07-04 RX ADMIN — BUDESONIDE AND FORMOTEROL FUMARATE DIHYDRATE 2 PUFF: 160; 4.5 AEROSOL RESPIRATORY (INHALATION) at 08:38

## 2022-07-04 RX ADMIN — LEVOTHYROXINE SODIUM 25 MCG: 25 TABLET ORAL at 06:07

## 2022-07-04 RX ADMIN — IPRATROPIUM BROMIDE 0.5 MG: 0.5 SOLUTION RESPIRATORY (INHALATION) at 07:24

## 2022-07-04 RX ADMIN — METOROPROLOL TARTRATE 5 MG: 5 INJECTION, SOLUTION INTRAVENOUS at 17:24

## 2022-07-04 RX ADMIN — LEVALBUTEROL HYDROCHLORIDE 1.25 MG: 1.25 SOLUTION, CONCENTRATE RESPIRATORY (INHALATION) at 14:32

## 2022-07-04 RX ADMIN — GABAPENTIN 300 MG: 250 SOLUTION ORAL at 13:15

## 2022-07-04 RX ADMIN — HEPARIN SODIUM 20 UNITS/KG/HR: 10000 INJECTION, SOLUTION INTRAVENOUS at 22:04

## 2022-07-04 RX ADMIN — METOCLOPRAMIDE HYDROCHLORIDE 10 MG: 5 INJECTION INTRAMUSCULAR; INTRAVENOUS at 06:02

## 2022-07-04 RX ADMIN — HEPARIN SODIUM 23 UNITS/KG/HR: 10000 INJECTION, SOLUTION INTRAVENOUS at 13:05

## 2022-07-04 RX ADMIN — HYDROMORPHONE HYDROCHLORIDE 1 MG: 1 INJECTION, SOLUTION INTRAMUSCULAR; INTRAVENOUS; SUBCUTANEOUS at 10:04

## 2022-07-04 RX ADMIN — GABAPENTIN 300 MG: 250 SOLUTION ORAL at 17:23

## 2022-07-04 RX ADMIN — POLYETHYLENE GLYCOL 3350 17 G: 17 POWDER, FOR SOLUTION ORAL at 08:38

## 2022-07-04 RX ADMIN — SENNOSIDES AND DOCUSATE SODIUM 1 TABLET: 8.6; 5 TABLET ORAL at 08:38

## 2022-07-04 RX ADMIN — LEVALBUTEROL HYDROCHLORIDE 1.25 MG: 1.25 SOLUTION, CONCENTRATE RESPIRATORY (INHALATION) at 07:24

## 2022-07-04 RX ADMIN — IPRATROPIUM BROMIDE 0.5 MG: 0.5 SOLUTION RESPIRATORY (INHALATION) at 14:32

## 2022-07-04 RX ADMIN — IPRATROPIUM BROMIDE 0.5 MG: 0.5 SOLUTION RESPIRATORY (INHALATION) at 20:42

## 2022-07-04 RX ADMIN — OXYCODONE HYDROCHLORIDE 10 MG: 5 SOLUTION ORAL at 05:48

## 2022-07-04 RX ADMIN — METOROPROLOL TARTRATE 5 MG: 5 INJECTION, SOLUTION INTRAVENOUS at 13:10

## 2022-07-04 RX ADMIN — LEVALBUTEROL HYDROCHLORIDE 1.25 MG: 1.25 SOLUTION, CONCENTRATE RESPIRATORY (INHALATION) at 20:42

## 2022-07-04 RX ADMIN — HEPARIN SODIUM 5000 UNITS: 1000 INJECTION INTRAVENOUS; SUBCUTANEOUS at 13:07

## 2022-07-04 RX ADMIN — Medication: at 22:05

## 2022-07-04 RX ADMIN — METOCLOPRAMIDE HYDROCHLORIDE 10 MG: 5 INJECTION INTRAMUSCULAR; INTRAVENOUS at 17:24

## 2022-07-04 RX ADMIN — ACETAMINOPHEN 650 MG: 650 SUSPENSION ORAL at 22:08

## 2022-07-04 RX ADMIN — ACETAMINOPHEN 650 MG: 650 SUSPENSION ORAL at 05:49

## 2022-07-04 RX ADMIN — METOROPROLOL TARTRATE 5 MG: 5 INJECTION, SOLUTION INTRAVENOUS at 22:12

## 2022-07-04 RX ADMIN — Medication 20 MG: at 05:49

## 2022-07-04 RX ADMIN — METOCLOPRAMIDE HYDROCHLORIDE 10 MG: 5 INJECTION INTRAMUSCULAR; INTRAVENOUS at 00:00

## 2022-07-04 RX ADMIN — HEPARIN SODIUM 21 UNITS/KG/HR: 10000 INJECTION, SOLUTION INTRAVENOUS at 01:39

## 2022-07-04 RX ADMIN — METOROPROLOL TARTRATE 5 MG: 5 INJECTION, SOLUTION INTRAVENOUS at 00:00

## 2022-07-04 RX ADMIN — ACETAMINOPHEN 650 MG: 650 SUSPENSION ORAL at 17:22

## 2022-07-04 RX ADMIN — METOROPROLOL TARTRATE 5 MG: 5 INJECTION, SOLUTION INTRAVENOUS at 06:05

## 2022-07-04 RX ADMIN — METOCLOPRAMIDE HYDROCHLORIDE 10 MG: 5 INJECTION INTRAMUSCULAR; INTRAVENOUS at 23:26

## 2022-07-04 NOTE — PLAN OF CARE
Problem: Prexisting or High Potential for Compromised Skin Integrity  Goal: Skin integrity is maintained or improved  Description: INTERVENTIONS:  - Identify patients at risk for skin breakdown  - Assess and monitor skin integrity  - Assess and monitor nutrition and hydration status  - Monitor labs   - Assess for incontinence   - Turn and reposition patient  - Assist with mobility/ambulation  - Relieve pressure over bony prominences  - Avoid friction and shearing  - Provide appropriate hygiene as needed including keeping skin clean and dry  - Evaluate need for skin moisturizer/barrier cream  - Collaborate with interdisciplinary team   - Patient/family teaching  - Consider wound care consult   Outcome: Progressing     Problem: Potential for Falls  Goal: Patient will remain free of falls  Description: INTERVENTIONS:  - Educate patient/family on patient safety including physical limitations  - Instruct patient to call for assistance with activity   - Consult OT/PT to assist with strengthening/mobility   - Keep Call bell within reach  - Keep bed low and locked with side rails adjusted as appropriate  - Keep care items and personal belongings within reach  - Initiate and maintain comfort rounds  - Make Fall Risk Sign visible to staff  - Offer Toileting every 1 Hours, in advance of need  - Initiate/Maintain alarm  - Obtain necessary fall risk management equipment  - Apply yellow socks and bracelet for high fall risk patients  - Consider moving patient to room near nurses station  Outcome: Progressing     Problem: MOBILITY - ADULT  Goal: Maintain or return to baseline ADL function  Description: INTERVENTIONS:  -  Assess patient's ability to carry out ADLs; assess patient's baseline for ADL function and identify physical deficits which impact ability to perform ADLs (bathing, care of mouth/teeth, toileting, grooming, dressing, etc )  - Assess/evaluate cause of self-care deficits   - Assess range of motion  - Assess patient's mobility; develop plan if impaired  - Assess patient's need for assistive devices and provide as appropriate  - Encourage maximum independence but intervene and supervise when necessary  - Involve family in performance of ADLs  - Assess for home care needs following discharge   - Consider OT consult to assist with ADL evaluation and planning for discharge  - Provide patient education as appropriate  Outcome: Progressing  Goal: Maintains/Returns to pre admission functional level  Description: INTERVENTIONS:  - Perform BMAT or MOVE assessment daily    - Set and communicate daily mobility goal to care team and patient/family/caregiver  - Collaborate with rehabilitation services on mobility goals if consulted  - Perform Range of Motion 3 times a day  - Reposition patient every 2 hours  - Dangle patient 3 times a day  - Stand patient 3 times a day  - Ambulate patient 3 times a day  - Out of bed to chair 3 times a day   - Out of bed for meals 3 times a day  - Out of bed for toileting  - Record patient progress and toleration of activity level   Outcome: Progressing     Problem: Nutrition/Hydration-ADULT  Goal: Nutrient/Hydration intake appropriate for improving, restoring or maintaining nutritional needs  Description: Monitor and assess patient's nutrition/hydration status for malnutrition  Collaborate with interdisciplinary team and initiate plan and interventions as ordered  Monitor patient's weight and dietary intake as ordered or per policy  Utilize nutrition screening tool and intervene as necessary  Determine patient's food preferences and provide high-protein, high-caloric foods as appropriate       INTERVENTIONS:  - Monitor oral intake, urinary output, labs, and treatment plans  - Assess nutrition and hydration status and recommend course of action  - Evaluate amount of meals eaten  - Assist patient with eating if necessary   - Allow adequate time for meals  - Recommend/ encourage appropriate diets, oral nutritional supplements, and vitamin/mineral supplements  - Order, calculate, and assess calorie counts as needed  - Recommend, monitor, and adjust tube feedings and TPN/PPN based on assessed needs  - Assess need for intravenous fluids  - Provide specific nutrition/hydration education as appropriate  - Include patient/family/caregiver in decisions related to nutrition  Outcome: Progressing     Problem: PAIN - ADULT  Goal: Verbalizes/displays adequate comfort level or baseline comfort level  Description: Interventions:  - Encourage patient to monitor pain and request assistance  - Assess pain using appropriate pain scale  - Administer analgesics based on type and severity of pain and evaluate response  - Implement non-pharmacological measures as appropriate and evaluate response  - Consider cultural and social influences on pain and pain management  - Notify physician/advanced practitioner if interventions unsuccessful or patient reports new pain  Outcome: Progressing     Problem: INFECTION - ADULT  Goal: Absence or prevention of progression during hospitalization  Description: INTERVENTIONS:  - Assess and monitor for signs and symptoms of infection  - Monitor lab/diagnostic results  - Monitor all insertion sites, i e  indwelling lines, tubes, and drains  - Monitor endotracheal if appropriate and nasal secretions for changes in amount and color  - [place] appropriate cooling/warming therapies per order  - Administer medications as ordered  - Instruct and encourage patient and family to use good hand hygiene technique  - Identify and instruct in appropriate isolation precautions for identified infection/condition  Outcome: Progressing     Problem: DISCHARGE PLANNING  Goal: Discharge to home or other facility with appropriate resources  Description: INTERVENTIONS:  - Identify barriers to discharge w/patient and caregiver  - Arrange for needed discharge resources and transportation as appropriate  - Identify discharge learning needs (meds, wound care, etc )  - Arrange for interpretive services to assist at discharge as needed  - Refer to Case Management Department for coordinating discharge planning if the patient needs post-hospital services based on physician/advanced practitioner order or complex needs related to functional status, cognitive ability, or social support system  Outcome: Progressing     Problem: Knowledge Deficit  Goal: Patient/family/caregiver demonstrates understanding of disease process, treatment plan, medications, and discharge instructions  Description: Complete learning assessment and assess knowledge base    Interventions:  - Provide teaching at level of understanding  - Provide teaching via preferred learning methods  Outcome: Progressing     Problem: CARDIOVASCULAR - ADULT  Goal: Maintains optimal cardiac output and hemodynamic stability  Description: INTERVENTIONS:  - Monitor I/O, vital signs and rhythm  - Monitor for S/S and trends of decreased cardiac output  - Administer and titrate ordered vasoactive medications to optimize hemodynamic stability  - Assess quality of pulses, skin color and temperature  - Assess for signs of decreased coronary artery perfusion  - Instruct patient to report change in severity of symptoms  Outcome: Progressing  Goal: Absence of cardiac dysrhythmias or at baseline rhythm  Description: INTERVENTIONS:  - Continuous cardiac monitoring, vital signs, obtain 12 lead EKG if ordered  - Administer antiarrhythmic and heart rate control medications as ordered  - Monitor electrolytes and administer replacement therapy as ordered  Outcome: Progressing     Problem: RESPIRATORY - ADULT  Goal: Achieves optimal ventilation and oxygenation  Description: INTERVENTIONS:  - Assess for changes in respiratory status  - Assess for changes in mentation and behavior  - Position to facilitate oxygenation and minimize respiratory effort  - Oxygen administered by appropriate delivery if ordered  - Initiate smoking cessation education as indicated  - Encourage broncho-pulmonary hygiene including cough, deep breathe, Incentive Spirometry  - Assess the need for suctioning and aspirate as needed  - Assess and instruct to report SOB or any respiratory difficulty  - Respiratory Therapy support as indicated  Outcome: Progressing     Problem: GASTROINTESTINAL - ADULT  Goal: Minimal or absence of nausea and/or vomiting  Description: INTERVENTIONS:  - Administer IV fluids if ordered to ensure adequate hydration  - Maintain NPO status until nausea and vomiting are resolved  - Nasogastric tube if ordered  - Administer ordered antiemetic medications as needed  - Provide nonpharmacologic comfort measures as appropriate  - Advance diet as tolerated, if ordered  - Consider nutrition services referral to assist patient with adequate nutrition and appropriate food choices  Outcome: Progressing  Goal: Maintains or returns to baseline bowel function  Description: INTERVENTIONS:  - Assess bowel function  - Encourage oral fluids to ensure adequate hydration  - Administer IV fluids if ordered to ensure adequate hydration  - Administer ordered medications as needed  - Encourage mobilization and activity  - Consider nutritional services referral to assist patient with adequate nutrition and appropriate food choices  Outcome: Progressing  Goal: Maintains adequate nutritional intake  Description: INTERVENTIONS:  - Monitor percentage of each meal consumed  - Identify factors contributing to decreased intake, treat as appropriate  - Assist with meals as needed  - Monitor I&O, weight, and lab values if indicated  - Obtain nutrition services referral as needed  Outcome: Progressing  Goal: Establish and maintain optimal ostomy function  Description: INTERVENTIONS:  - Assess bowel function  - Encourage oral fluids to ensure adequate hydration  - Administer IV fluids if ordered to ensure adequate hydration   - Administer ordered medications as needed  - Encourage mobilization and activity  - Nutrition services referral to assist patient with appropriate food choices  - Assess stoma site  - Consider wound care consult   Outcome: Progressing  Goal: Oral mucous membranes remain intact  Description: INTERVENTIONS  - Assess oral mucosa and hygiene practices  - Implement preventative oral hygiene regimen  - Implement oral medicated treatments as ordered  - Initiate Nutrition services referral as needed  Outcome: Progressing     Problem: GENITOURINARY - ADULT  Goal: Maintains or returns to baseline urinary function  Description: INTERVENTIONS:  - Assess urinary function  - Encourage oral fluids to ensure adequate hydration if ordered  - Administer IV fluids as ordered to ensure adequate hydration  - Administer ordered medications as needed  - Offer frequent toileting  - Follow urinary retention protocol if ordered  Outcome: Progressing  Goal: Absence of urinary retention  Description: INTERVENTIONS:  - Assess patients ability to void and empty bladder  - Monitor I/O  - Bladder scan as needed  - Discuss with physician/AP medications to alleviate retention as needed  - Discuss catheterization for long term situations as appropriate  Outcome: Progressing  Goal: Urinary catheter remains patent  Description: INTERVENTIONS:  - Assess patency of urinary catheter  - If patient has a chronic carmen, consider changing catheter if non-functioning  - Follow guidelines for intermittent irrigation of non-functioning urinary catheter  Outcome: Progressing     Problem: SKIN/TISSUE INTEGRITY - ADULT  Goal: Skin Integrity remains intact(Skin Breakdown Prevention)  Description: Assess:  -Perform [place] assessment every shift  -Clean and moisturize skin every shift  -Inspect skin when repositioning, toileting, and assisting with ADLS  -Assess under medical devices  -Assess extremities for adequate circulation and sensation     Bed Management:  -Have minimal linens on bed & keep smooth, unwrinkled  -Change linens as needed when moist or perspiring  -Avoid sitting or lying in one position for more than 2 hours while in bed  -Keep HOB at 30 degrees     Toileting:  -Offer bedside commode  -Assess for incontinence   -Use incontinent care products after each incontinent episode    Activity:  -Encourage activity and walks on unit  -Encourage or provide ROM exercises   -Turn and reposition patient every 2 Hours  -Use appropriate equipment to lift or move patient in bed      Skin Care:  -Avoid use of baby powder, tape, friction and shearing, hot water or constrictive clothing  -Relieve pressure over bony prominences  -Do not massage red bony areas      Outcome: Progressing  Goal: Incision(s), wounds(s) or drain site(s) healing without S/S of infection  Description: INTERVENTIONS  - Assess and document dressing, incision, wound bed, drain sites and surrounding tissue  - Provide patient and family education  - Perform skin care/dressing changes daily  Outcome: Progressing

## 2022-07-04 NOTE — PLAN OF CARE
Problem: Prexisting or High Potential for Compromised Skin Integrity  Goal: Skin integrity is maintained or improved  Description: INTERVENTIONS:  - Identify patients at risk for skin breakdown  - Assess and monitor skin integrity  - Assess and monitor nutrition and hydration status  - Monitor labs   - Assess for incontinence   - Turn and reposition patient  - Assist with mobility/ambulation  - Relieve pressure over bony prominences  - Avoid friction and shearing  - Provide appropriate hygiene as needed including keeping skin clean and dry  - Evaluate need for skin moisturizer/barrier cream  - Collaborate with interdisciplinary team   - Patient/family teaching  - Consider wound care consult   Outcome: Progressing     Problem: Potential for Falls  Goal: Patient will remain free of falls  Description: INTERVENTIONS:  - Educate patient/family on patient safety including physical limitations  - Instruct patient to call for assistance with activity   - Consult OT/PT to assist with strengthening/mobility   - Keep Call bell within reach  - Keep bed low and locked with side rails adjusted as appropriate  - Keep care items and personal belongings within reach  - Initiate and maintain comfort rounds  - Make Fall Risk Sign visible to staff  - Offer Toileting every 1 Hours, in advance of need  - Initiate/Maintain alarm  - Obtain necessary fall risk management equipment  - Apply yellow socks and bracelet for high fall risk patients  - Consider moving patient to room near nurses station  Outcome: Progressing     Problem: MOBILITY - ADULT  Goal: Maintain or return to baseline ADL function  Description: INTERVENTIONS:  -  Assess patient's ability to carry out ADLs; assess patient's baseline for ADL function and identify physical deficits which impact ability to perform ADLs (bathing, care of mouth/teeth, toileting, grooming, dressing, etc )  - Assess/evaluate cause of self-care deficits   - Assess range of motion  - Assess patient's mobility; develop plan if impaired  - Assess patient's need for assistive devices and provide as appropriate  - Encourage maximum independence but intervene and supervise when necessary  - Involve family in performance of ADLs  - Assess for home care needs following discharge   - Consider OT consult to assist with ADL evaluation and planning for discharge  - Provide patient education as appropriate  Outcome: Progressing  Goal: Maintains/Returns to pre admission functional level  Description: INTERVENTIONS:  - Perform BMAT or MOVE assessment daily    - Set and communicate daily mobility goal to care team and patient/family/caregiver  - Collaborate with rehabilitation services on mobility goals if consulted  - Perform Range of Motion 3 times a day  - Reposition patient every 2 hours  - Dangle patient 3 times a day  - Stand patient 3 times a day  - Ambulate patient 3 times a day  - Out of bed to chair 3 times a day   - Out of bed for meals 3 times a day  - Out of bed for toileting  - Record patient progress and toleration of activity level   Outcome: Progressing     Problem: Nutrition/Hydration-ADULT  Goal: Nutrient/Hydration intake appropriate for improving, restoring or maintaining nutritional needs  Description: Monitor and assess patient's nutrition/hydration status for malnutrition  Collaborate with interdisciplinary team and initiate plan and interventions as ordered  Monitor patient's weight and dietary intake as ordered or per policy  Utilize nutrition screening tool and intervene as necessary  Determine patient's food preferences and provide high-protein, high-caloric foods as appropriate       INTERVENTIONS:  - Monitor oral intake, urinary output, labs, and treatment plans  - Assess nutrition and hydration status and recommend course of action  - Evaluate amount of meals eaten  - Assist patient with eating if necessary   - Allow adequate time for meals  - Recommend/ encourage appropriate diets, oral nutritional supplements, and vitamin/mineral supplements  - Order, calculate, and assess calorie counts as needed  - Recommend, monitor, and adjust tube feedings and TPN/PPN based on assessed needs  - Assess need for intravenous fluids  - Provide specific nutrition/hydration education as appropriate  - Include patient/family/caregiver in decisions related to nutrition  Outcome: Progressing     Problem: PAIN - ADULT  Goal: Verbalizes/displays adequate comfort level or baseline comfort level  Description: Interventions:  - Encourage patient to monitor pain and request assistance  - Assess pain using appropriate pain scale  - Administer analgesics based on type and severity of pain and evaluate response  - Implement non-pharmacological measures as appropriate and evaluate response  - Consider cultural and social influences on pain and pain management  - Notify physician/advanced practitioner if interventions unsuccessful or patient reports new pain  Outcome: Progressing     Problem: INFECTION - ADULT  Goal: Absence or prevention of progression during hospitalization  Description: INTERVENTIONS:  - Assess and monitor for signs and symptoms of infection  - Monitor lab/diagnostic results  - Monitor all insertion sites, i e  indwelling lines, tubes, and drains  - Monitor endotracheal if appropriate and nasal secretions for changes in amount and color  - [place] appropriate cooling/warming therapies per order  - Administer medications as ordered  - Instruct and encourage patient and family to use good hand hygiene technique  - Identify and instruct in appropriate isolation precautions for identified infection/condition  Outcome: Progressing     Problem: DISCHARGE PLANNING  Goal: Discharge to home or other facility with appropriate resources  Description: INTERVENTIONS:  - Identify barriers to discharge w/patient and caregiver  - Arrange for needed discharge resources and transportation as appropriate  - Identify discharge learning needs (meds, wound care, etc )  - Arrange for interpretive services to assist at discharge as needed  - Refer to Case Management Department for coordinating discharge planning if the patient needs post-hospital services based on physician/advanced practitioner order or complex needs related to functional status, cognitive ability, or social support system  Outcome: Progressing     Problem: Knowledge Deficit  Goal: Patient/family/caregiver demonstrates understanding of disease process, treatment plan, medications, and discharge instructions  Description: Complete learning assessment and assess knowledge base    Interventions:  - Provide teaching at level of understanding  - Provide teaching via preferred learning methods  Outcome: Progressing     Problem: CARDIOVASCULAR - ADULT  Goal: Maintains optimal cardiac output and hemodynamic stability  Description: INTERVENTIONS:  - Monitor I/O, vital signs and rhythm  - Monitor for S/S and trends of decreased cardiac output  - Administer and titrate ordered vasoactive medications to optimize hemodynamic stability  - Assess quality of pulses, skin color and temperature  - Assess for signs of decreased coronary artery perfusion  - Instruct patient to report change in severity of symptoms  Outcome: Progressing  Goal: Absence of cardiac dysrhythmias or at baseline rhythm  Description: INTERVENTIONS:  - Continuous cardiac monitoring, vital signs, obtain 12 lead EKG if ordered  - Administer antiarrhythmic and heart rate control medications as ordered  - Monitor electrolytes and administer replacement therapy as ordered  Outcome: Progressing     Problem: RESPIRATORY - ADULT  Goal: Achieves optimal ventilation and oxygenation  Description: INTERVENTIONS:  - Assess for changes in respiratory status  - Assess for changes in mentation and behavior  - Position to facilitate oxygenation and minimize respiratory effort  - Oxygen administered by appropriate delivery if ordered  - Initiate smoking cessation education as indicated  - Encourage broncho-pulmonary hygiene including cough, deep breathe, Incentive Spirometry  - Assess the need for suctioning and aspirate as needed  - Assess and instruct to report SOB or any respiratory difficulty  - Respiratory Therapy support as indicated  Outcome: Progressing     Problem: GASTROINTESTINAL - ADULT  Goal: Minimal or absence of nausea and/or vomiting  Description: INTERVENTIONS:  - Administer IV fluids if ordered to ensure adequate hydration  - Maintain NPO status until nausea and vomiting are resolved  - Nasogastric tube if ordered  - Administer ordered antiemetic medications as needed  - Provide nonpharmacologic comfort measures as appropriate  - Advance diet as tolerated, if ordered  - Consider nutrition services referral to assist patient with adequate nutrition and appropriate food choices  Outcome: Progressing  Goal: Maintains or returns to baseline bowel function  Description: INTERVENTIONS:  - Assess bowel function  - Encourage oral fluids to ensure adequate hydration  - Administer IV fluids if ordered to ensure adequate hydration  - Administer ordered medications as needed  - Encourage mobilization and activity  - Consider nutritional services referral to assist patient with adequate nutrition and appropriate food choices  Outcome: Progressing  Goal: Maintains adequate nutritional intake  Description: INTERVENTIONS:  - Monitor percentage of each meal consumed  - Identify factors contributing to decreased intake, treat as appropriate  - Assist with meals as needed  - Monitor I&O, weight, and lab values if indicated  - Obtain nutrition services referral as needed  Outcome: Progressing  Goal: Establish and maintain optimal ostomy function  Description: INTERVENTIONS:  - Assess bowel function  - Encourage oral fluids to ensure adequate hydration  - Administer IV fluids if ordered to ensure adequate hydration   - Administer ordered medications as needed  - Encourage mobilization and activity  - Nutrition services referral to assist patient with appropriate food choices  - Assess stoma site  - Consider wound care consult   Outcome: Progressing  Goal: Oral mucous membranes remain intact  Description: INTERVENTIONS  - Assess oral mucosa and hygiene practices  - Implement preventative oral hygiene regimen  - Implement oral medicated treatments as ordered  - Initiate Nutrition services referral as needed  Outcome: Progressing     Problem: GENITOURINARY - ADULT  Goal: Maintains or returns to baseline urinary function  Description: INTERVENTIONS:  - Assess urinary function  - Encourage oral fluids to ensure adequate hydration if ordered  - Administer IV fluids as ordered to ensure adequate hydration  - Administer ordered medications as needed  - Offer frequent toileting  - Follow urinary retention protocol if ordered  Outcome: Progressing  Goal: Absence of urinary retention  Description: INTERVENTIONS:  - Assess patients ability to void and empty bladder  - Monitor I/O  - Bladder scan as needed  - Discuss with physician/AP medications to alleviate retention as needed  - Discuss catheterization for long term situations as appropriate  Outcome: Progressing  Goal: Urinary catheter remains patent  Description: INTERVENTIONS:  - Assess patency of urinary catheter  - If patient has a chronic carmen, consider changing catheter if non-functioning  - Follow guidelines for intermittent irrigation of non-functioning urinary catheter  Outcome: Progressing     Problem: SKIN/TISSUE INTEGRITY - ADULT  Goal: Skin Integrity remains intact(Skin Breakdown Prevention)  Description: Assess:  -Perform [place] assessment every   -Clean and moisturize skin every   -Inspect skin when repositioning, toileting, and assisting with ADLS  -Assess under medical devices such as  every   -Assess extremities for adequate circulation and sensation     Bed Management:  -Have minimal linens on bed & keep smooth, unwrinkled  -Change linens as needed when moist or perspiring  -Avoid sitting or lying in one position for more than  hours while in bed  -Keep HOB atdegrees     Toileting:  -Offer bedside commode  -Assess for incontinence every   -Use incontinent care products after each incontinent episode such as     Activity:  -Mobilize patient  times a day  -Encourage activity and walks on unit  -Encourage or provide ROM exercises   -Turn and reposition patient every  Hours  -Use appropriate equipment to lift or move patient in bed  -Instruct/ Assist with weight shifting every  when out of bed in chair  -Consider limitation of chair time  hour intervals    Skin Care:  -Avoid use of baby powder, tape, friction and shearing, hot water or constrictive clothing  -Relieve pressure over bony prominences using  -Do not massage red bony areas    Next Steps:  -Teach patient strategies to minimize risks such as    -Consider consults to  interdisciplinary teams such as   Outcome: Progressing  Goal: Incision(s), wounds(s) or drain site(s) healing without S/S of infection  Description: INTERVENTIONS  - Assess and document dressing, incision, wound bed, drain sites and surrounding tissue  - Provide patient and family education  - Perform skin care/dressing changes every   Outcome: Progressing  Goal: Pressure injury heals and does not worsen  Description: Interventions:  - Implement low air loss mattress or specialty surface (Criteria met)  - Apply silicone foam dressing  - Instruct/assist with weight shifting every  minutes when in chair   - Limit chair time in hour intervals  - Use special pressure reducing interventions such as  when in chair   - Apply fecal or urinary incontinence containment device   - Perform passive or active ROM every   - Turn and reposition patient & offload bony prominences every hours   - Utilize friction reducing device or surface for transfers   - Consider consults to interdisciplinary teams such as   - Use incontinent care products after each incontinent episode such as   - Consider nutrition services referral as needed  Outcome: Progressing

## 2022-07-04 NOTE — PROGRESS NOTES
Progress Note - General Surgery   Calista Mayers 47 y o  male MRN: [id number]  Unit/Bed#: [facility] 607-01 Encounter: [id number]    Assessment:  48yoM p/w SBO 2/2 chronic incarcerated ventral hernia now s/p   6/14  ex lap, HARVEY 6/14, also s/p abdominal washout, repair of serosal tears,  6/15 washout,abhera, VAC DPR  6/17 bridging vicryl mesh placement, wound vac placement   6/30 I&D, washout and vac placement    Yesterday episodes of tachycardia and hypoxia, unable to get CT 2/2 body habitus  ECHO-No evidence of right heart strain    Stool-2x  Vac-250 cc  Uo-900 1x unmeasured        Plan  · Continue clears this AM, slowly advance as tolerated  · Continue bowel regimen  · Appreciate cardiology recommendations, increased digoxin and metoprolol yday  · Continue TPN until tolerating adequate PO  · Continue hep gtt  · Plan for STSG on on Tuesday 7/5, NPO @mn  · Will plan for VAC change today at bedside  · Pain/nausea control PRN      Subjective/Objective   Subjective:   Events from yesterday noted  Did much better overnight  Patient feels better today, sob has improved  He denies any fevers/chills  Mild nausea, no vomiting  Tolerated clears  2 bms yesterday  Passing flatus  Objective:     Blood pressure 104/70, pulse 101, temperature (!) 97 4 °F (36 3 °C), resp  rate 16, height 5' 11" (1 803 m), weight (!) 223 kg (492 lb), SpO2 97 %  ,Body mass index is 68 62 kg/m²  Intake/Output Summary (Last 24 hours) at 7/4/2022 0615  Last data filed at 7/4/2022 0201  Gross per 24 hour   Intake 3222 41 ml   Output 2350 ml   Net 872 41 ml       Invasive Devices  Report    Peripherally Inserted Central Catheter Line  Duration           PICC Line 41/91/36 Right Basilic 20 days                Physical Exam:   General: NAD  HENT: NCAT MMM  Neck: supple, no JVD  CV: tachycardia  Lungs: nl wob  No resp distress  4L NC  ABD: Soft, protuberent, nontender, nondistended   Wound vac in place with good seal  Extrem: No CCE  Neuro: AAOx3          Lab, Imaging and other studies:  I have personally reviewed pertinent lab results    , CBC:   Lab Results   Component Value Date    WBC 6 77 07/03/2022    HGB 9 1 (L) 07/03/2022    HCT 30 1 (L) 07/03/2022    MCV 95 07/03/2022     07/03/2022    MCH 28 7 07/03/2022    MCHC 30 2 (L) 07/03/2022    RDW 15 7 (H) 07/03/2022    MPV 11 0 07/03/2022    NRBC 1 07/03/2022   , CMP:   Lab Results   Component Value Date    SODIUM 134 (L) 07/03/2022    K 3 8 07/03/2022    CL 91 (L) 07/03/2022    CO2 36 (H) 07/03/2022    BUN 26 (H) 07/03/2022    CREATININE 0 78 07/03/2022    CALCIUM 8 9 07/03/2022    EGFR 104 07/03/2022     VTE Pharmacologic Prophylaxis: heparin  VTE Mechanical Prophylaxis: sequential compression device

## 2022-07-04 NOTE — PROGRESS NOTES
Cardiology Progress Note - Randell King 47 y o  male MRN: [id number]    Unit/Bed#: [facility] 607-01 Encounter: [id number]      Assessment:  Principal Problem:    SBO  Active Problems:    Atrial fibrillation with RVR (HCC)    COPD (chronic obstructive pulmonary disease) (HCC)    Acute respiratory failure (HCC)      Plan:  NG tube out  On clear liquids  Continues on TPN and intravenous heparin  Continue intravenous cardiac medication till it is clear that he is tolerating PO   AFib with moderate ventricular response on telemetry  Will check digoxin level later in the week  BMP today with potassium of 3 7 and creatinine of 0 71  For OR tomorrow  Subjective:   Patient seen and examined  No significant events overnight   negative  Objective:     Vitals: Blood pressure 104/70, pulse 101, temperature (!) 97 4 °F (36 3 °C), resp  rate 16, height 5' 11" (1 803 m), weight (!) 223 kg (492 lb), SpO2 97 %  , Body mass index is 68 62 kg/m² ,   Orthostatic Blood Pressures    Flowsheet Row Most Recent Value   Blood Pressure 104/70 filed at 07/04/2022 0235   Patient Position - Orthostatic VS Lying filed at 06/29/2022 0731      ,      Intake/Output Summary (Last 24 hours) at 7/4/2022 0941  Last data filed at 7/4/2022 0612  Gross per 24 hour   Intake 2328 71 ml   Output 1450 ml   Net 878 71 ml       No significant arrhythmias seen on telemetry review  Physical Exam:    GEN: Randell King   NECK: supple, no carotid bruits, no JVD or HJR  HEART: normal rate, irregular rhythm, normal S1 and S2, no murmurs, clicks, gallops or rubs   LUNGS: clear to auscultation bilaterally; no wheezes, rales, or rhonchi   EXTREMITIES: edema  SKIN: warm and well perfused, no suspicious lesions on exposed skin    Labs & Results:    No results displayed because visit has over 200 results  XR chest portable    Result Date: 7/3/2022  Narrative: CHEST INDICATION:   Hypoxia   COMPARISON:  6/24/2022 EXAM PERFORMED/VIEWS:  XR CHEST PORTABLE 12:27 PM FINDINGS:  Nasogastric tube enters the stomach but the tip is not imaged  Heart appears mildly enlarged  Degree of inspiration suboptimal   Suspected vascular and interstitial prominence suggesting volume overload without focal pulmonary opacification  No pneumothorax or effusion identified  Osseous structures appear within normal limits for patient age  Impression: 1  Technically limited study  2   Mild vascular and  interstitial prominence suggesting mild volume overload  3   Nasogastric tube as noted  Workstation performed: [id number]     XR chest portable    Result Date: 6/24/2022  Narrative: CHEST INDICATION:   Concern for aspiration  COMPARISON:  June 21, 2022 EXAM PERFORMED/VIEWS:  XR CHEST PORTABLE Single image FINDINGS:  Diminished lung volumes  Mild cardiomegaly  Diffuse interstitial prominence with peribronchial thickening and vascular congestion  Enteric tube in: The stomach  Right PICC line catheter can be followed to the mid SVC, tip is not seen  Numerous EKG leads in place  The lungs are clear  No pneumothorax or pleural effusion  Osseous structures appear within normal limits for patient age  Impression: Diminished lung volumes  Pulmonary edema exaggerated by vascular crowding  Workstation performed: [id number]     XR chest portable    Result Date: 6/22/2022  Narrative: CHEST INDICATION:   vomiting/aspiration rapid response  COMPARISON:  6/18/2022 EXAM PERFORMED/VIEWS:  XR CHEST PORTABLE FINDINGS: Interval removal of the ET tube is visualized  Interval removal of the right-sided central line is visualized  Persistent NG tube is seen extending into the mid stomach  Persistent right-sided PICC line is seen extending into the superior vena cava  Limited examination due to low lung volume and rotation of the patient  There is persistent bilateral particularly left perihilar and basilar infiltrate  Associated bilateral pleural effusion cannot be excluded   No pneumothorax is visualized  Unchanged cardiac silhouette is visualized  Impression: Interval removal of the ET tube and right-sided central line with persistent NG tube and right-sided PICC line Unchanged bilateral particularly left-sided airspace disease identified Workstation performed: [id number]     XR chest portable    Result Date: 6/13/2022  Narrative: CHEST INDICATION:   line placement  COMPARISON:  May 25, 2022 EXAM PERFORMED/VIEWS:  XR CHEST PORTABLE FINDINGS:  A nasogastric tube has been placed  It extends below the diaphragm but its tip is not included on this exam   A right-sided PICC line is present with its tip in the expected location of the superior vena cava  The heart is enlarged  The pulmonary vessels are distended  Evaluation of the lungs is limited by patient size  There appear to be hazy opacities in both lungs  No evidence of consolidation  There is no evidence of pleural effusion or pneumothorax  Osseous structures appear within normal limits for patient age  Impression: 1  Tip of right-sided PICC line in superior vena cava  2   3   Nasogastric tube present although tip not included on this exam  4   Pulmonary vascular congestion and suggestion of mild pulmonary edema  Workstation performed: [id number]     XR abdomen 1 view kub    Result Date: 7/3/2022  Narrative: ABDOMEN INDICATION:   Hypoxia abd pain  COMPARISON:  None VIEWS:  AP supine FINDINGS: There is a nonobstructive bowel gas pattern  Water-soluble contrast opacifies nondistended colon to the level the rectum without evidence for intestinal obstruction  Endogastric tube terminates in the stomach  No discernible free air on this supine study  Upright or left lateral decubitus imaging is more sensitive to detect subtle free air in the appropriate setting  No pathologic calcifications or soft tissue masses  Infrarenal inferior vena caval filter is present  Visualized osseous structures are unremarkable for the patient's age  Impression: 1  Contrast opacifies nondilated colon and rectum  No high-grade obstruction noted  2   Endogastric tube terminates in the stomach  Workstation performed: [id number]     XR abdomen 1 view kub    Result Date: 6/30/2022  Narrative: ABDOMEN INDICATION:   Need to assess for progression of contrast given earlier today for CT scan with PO contrast  COMPARISON:  Abdominal radiograph June 29, 2022 at 12:49 VIEWS:  AP supine FINDINGS: Tip of enteric tube projects over the stomach  Single short segment contrast-filled loop of small bowel is dilated to 5 cm in the central abdomen, likely within the distal duodenum  Enteric contrast opacifies the nondistended ascending, transverse, and proximal descending colon  Mildly dilated loops of air-filled colon in the lower left abdomen  Impression: Contrast has passed into the proximal descending colon  Mildly dilated loops of air-filled distal colon  A distal obstruction is not excluded, and follow-up radiograph could be considered at this time to confirm progression of contrast  The study was marked in [facility] for immediate notification  Workstation performed: [id number]     XR abdomen 1 view kub    Result Date: 6/27/2022  Narrative: ABDOMEN INDICATION:  NGT placement  COMPARISON:  Abdominal radiographs 6/21/2022, CT abdomen and pelvis 6/4/2022 VIEWS:  AP semierect-portable Images: 1 *(centered over the thoracoabdominal junction as per protocol for this indication); The lower abdomen and pelvis were excluded from the field-of-view  FINDINGS: Enteric tube courses to the stomach, the distal tip is looped back upon itself terminating in the region of the distal esophagus  Nonspecific bowel gas pattern  Contrast material seen within central small bowel loop  No discernible free air on this limited portable study  No pathologic calcifications or soft tissue masses as visualized  Visualized lung bases are grossly clear   Visualized osseous structures are unremarkable for the patient's age  Impression: Enteric tube tip looped upon itself terminating in the region of the distal esophagus  Repositioning advised  Nonspecific bowel gas pattern  The study was marked in [facility] for immediate notification  Workstation performed: [id number]     XR abdomen 1 view kub    Result Date: 6/22/2022  Narrative: ABDOMEN INDICATION:   persistant emesis after sx, sbo  COMPARISON:  None VIEWS:  AP supine FINDINGS: 3 x-rays of the abdomen were obtained  NG tube is seen extending into the distal stomach  Persistent bilateral particularly left-sided perihilar and basilar infiltrate possibly associated with bilateral pleural effusion  No pneumoperitoneum is visualized  Nonspecific abdominal gas pattern is visualized including air-filled dilated small bowel loops in this context of small bowel obstruction Degenerative changes is seen within the spine  Impression: NG tube in correct location  Persistent bilateral particularly left-sided pleuroparenchymal changes Persistent dilated small bowel loops in this context of small bowel obstruction Workstation performed: [id number]     XR abdomen 1 view kub    Result Date: 6/13/2022  Narrative: ABDOMEN INDICATION:   Bowel gas pattern  Follow contrast  SBO    COMPARISON:  Abdominal radiograph from June 9, 2022 VIEWS:  AP supine FINDINGS: Limited examination due to the patient's size  There has been decrease in the extent of bowel distention since the last exam   Enteric contrast now opacifies colon from cecum to distal descending colon  No discernible free air on this supine study  Upright or left lateral decubitus imaging is more sensitive to detect subtle free air in the appropriate setting  No pathologic calcifications or soft tissue masses  Visualized lung bases are clear  Visualized osseous structures are unremarkable for the patient's age  Impression: 1  Markedly limited examination due to the patient's size   2   Decrease in the degree of bowel distention since 6/9/2022  3   Enteric contrast is now located in the colon, extending from the cecum to the distal descending colon  Workstation performed: [id number]     XR abdomen obstruction series    Result Date: 6/9/2022  Narrative: OBSTRUCTION SERIES INDICATION:   SBO, follow up oral contrast  COMPARISON:  Chest x-ray performed the previous day  EXAM PERFORMED/VIEWS:  XR ABDOMEN OBSTRUCTION SERIES 10 FINDINGS: This study is limited due to the patient's large body habitus  The distal tip of the enteric tube is in the left upper quadrant of the abdomen in the stomach  There are persistent distended loops of small bowel in the left abdomen consistent with known small bowel obstruction  There is oral contrast within nondistended colon  A retrievable IVC filter is noted  No free air beneath the hemidiaphragms  No pathologic calcifications or soft tissue masses evident  Osseous structures are unremarkable  Examination of the chest reveals a normal cardiomediastinal silhouette  Lungs are clear  Impression: Persistent partial small bowel obstruction  Workstation performed: [id number]     XR abdomen obstruction series    Result Date: 6/9/2022  Narrative: OBSTRUCTION SERIES INDICATION:   SBO  COMPARISON:  Abdominal radiographs dated 6/6/2022  CT abdomen/pelvis dated 6/4/2022  EXAM PERFORMED/VIEWS:  XR ABDOMEN OBSTRUCTION SERIES FINDINGS: Markedly technically limited examination due to body habitus and underpenetration  Tip of enteric tube overlies the stomach  Persistently dilated small bowel loops in the left hemiabdomen  Oral contrast has progressed to the right colon  No definite air in the rectum  IVC filter noted  Impression: Technically limited examination due to body habitus and underpenetration  Persistently dilated small bowel loops in the left hemiabdomen in this patient with known small bowel obstruction  Contrast has progressed to the right colon  No definite air in the rectum   Workstation performed: [id number]     XR abdomen obstruction series    Result Date: 6/6/2022  Narrative: OBSTRUCTION SERIES INDICATION:   Small bowel obstruction  Abdominal pain  COMPARISON:  June 5, 2022 EXAM PERFORMED/VIEWS:  XR ABDOMEN OBSTRUCTION SERIES FINDINGS: Enteric tube present, tip overlying left upper quadrant of the abdomen  Examination is limited secondary to patient body habitus and underpenetration  Gaseous distention of a portion of the stomach is noted  Cholecystectomy clips noted  IVC filter noted  No evidence for acute osseous abnormality  Impression: Enteric tube in expected location, tip beneath left hemidiaphragm  Workstation performed: [id number]     XR abdomen obstruction series    Result Date: 6/5/2022  Narrative: OBSTRUCTION SERIES INDICATION:   eval sbo progress  COMPARISON:  Abdomen and pelvic CT from 6/4/2022  EXAM PERFORMED/VIEWS:  XR ABDOMEN OBSTRUCTION SERIES FINDINGS: Nasogastric tube in the stomach  Unchanged gastric and small bowel distention consistent with small bowel obstruction  No free air beneath the hemidiaphragms  No pathologic calcifications or soft tissue masses evident  IVC filter noted  Osseous structures are unremarkable  Examination of the chest reveals a normal cardiomediastinal silhouette  Lungs are clear  Impression: Unchanged gastric and small bowel distention consistent with small bowel obstruction  Workstation performed: [id number]     FL barium swallow video w speech    Result Date: 6/23/2022  Narrative: A video barium swallow study was performed by the Department of Speech Pathology  Please refer to the report for the official interpretation  The images are stored for archival purposes only  Study images were not formally reviewed by the Radiology Department  XR abdomen 1 vw portable    Result Date: 6/29/2022  Narrative: ABDOMEN INDICATION:   NGT placement   COMPARISON:  None VIEWS:  AP supine FINDINGS: Repositioned enteric tube tip is in the region of the proximal stomach  Oral contrast in the stomach and proximal small bowel  No dilated bowel, fluid levels or free air identified  Visualized lung bases are clear  Stable cardiomediastinal silhouette  No acute osseous pathology  IVC filter  Impression: Enteric tube in the stomach  Workstation performed: [id number]     XR chest portable ICU    Result Date: 6/19/2022  Narrative: CHEST INDICATION:   Eval for volume overload, pleural effusion  COMPARISON:  Chest radiographs June 17, 2022, May 25, 2022, and April 3, 2022  CT chest October 13, 2016  EXAM PERFORMED/VIEWS:  XR CHEST PORTABLE ICU FINDINGS:  Image quality limited by patient body habitus and low lung volumes  Enteric tube terminates below the diaphragm, endotracheal tube satisfactorily positioned 4 5 cm above the cornelio  Right internal jugular central venous catheter terminates in expected location of superior vena cava just above the right main bronchus  Moderate cardiomegaly  Pulmonary vasculature indistinct  The left hemidiaphragm remains silhouetted concerning for consolidation the left lung base raising the possibility of pneumonia  Osseous structures appear within normal limits for patient age  Impression: 1  Image quality limited but suspicious for pneumonia in the left lung base unchanged since the most recent prior study  Superimposed CHF would also be a consideration  2   Lines and tubes in satisfactory position  Workstation performed: [id number]     XR chest portable ICU    Result Date: 6/17/2022  Narrative: CHEST INDICATION:   R/o PTX  COMPARISON:  6/17/2022 EXAM PERFORMED/VIEWS:  XR CHEST PORTABLE ICU FINDINGS:  ET tube tip at the level of the clavicles  NG tube tip below the diaphragm  Right IJ approach central venous catheter seen with its tip junction of the SVC and right atrium  Right upper extremity approach PICC line seen with its tip at the junction of the SVC and right atrium   Heart shadow is enlarged and there is pulmonary vascular congestion concerning for CHF  Questioned left basilar infiltrate  Gale Soulier No pneumothorax or pleural effusion  Osseous structures appear within normal limits for patient age  Impression: Questioned left basilar infiltrate  Gale Soulier Heart shadow is enlarged and there is pulmonary vascular congestion concerning for CHF  Workstation performed: [id number]     XR chest portable ICU    Result Date: 6/17/2022  Narrative: CHEST INDICATION:   Decreased TV, increased peak pressures and secretions, eval for PTX and consolidaiton  COMPARISON:  Chest radiograph June 13, 2022 EXAM PERFORMED/VIEWS:  XR CHEST PORTABLE ICU FINDINGS:  Tip of endotracheal tube is approximately 5 5 cm above the cornelio  Distal end of enteric tube is beneath the diaphragm; tip is excluded from field-of-view Heart shadow is enlarged but unchanged from prior exam  Prominence of the interstitial markings  No definitive consolidation or pleural effusion  No pneumothorax  Impression: Pulmonary vascular congestion without a definitive consolidation or effusion  No pneumothorax  Workstation performed: [id number]     [address] [facility] bedside procedure    Result Date: 6/18/2022  Narrative: 1 2 840 [id number] 2 323 [id number] 2     bedside procedure    Result Date: 6/14/2022  Narrative: 1 2 840 [id number]  4 [id number]  6 [id number]    IR PICC line placement double lumen    Result Date: 6/13/2022  Narrative: Peripherally inserted central venous catheter Clinical History: IV access  Fluoroscopy time: 0 minutes  Findings: A fluoroscopic spot view of the chest was obtained demonstrating a 5 [place] double-lumen PICC line terminating at the RA/SVC junction  The procedure was performed by the interventional radiology staff  All elements of maximal sterile barrier technique, cap and mask and sterile gown and sterile gloves and sterile full-body drape and hand hygiene and 2% chlorhexidine for cutaneous antisepsis    Sterile ultrasound technique with sterile gel and sterile probe covers was also utilized  Impression: Impression: PICC line centrally positioned  Workstation performed: [id number]     Echo follow up/limited w/ contrast if indicated    Result Date: 7/3/2022  Narrative: Whitney Courser  Left Ventricle: Left ventricle is not well visualized  Left ventricular cavity size is mildly dilated  Wall thickness is mildly increased  by visual estimation  Systolic function is mildly reduced  There is mild global hypokinesis    Mitral Valve: There is mild regurgitation    Tricuspid Valve: There is mild regurgitation    Pericardium: There is a small pericardial effusion  EKG personally reviewed by Emelyn Haq MD      Counseling / Coordination of Care  Total floor / unit time spent today 30 minutes  Greater than 50% of total time was spent with the patient and / or family counseling and / or coordination of care

## 2022-07-04 NOTE — PLAN OF CARE
Problem: Prexisting or High Potential for Compromised Skin Integrity  Goal: Skin integrity is maintained or improved  Description: INTERVENTIONS:  - Identify patients at risk for skin breakdown  - Assess and monitor skin integrity  - Assess and monitor nutrition and hydration status  - Monitor labs   - Assess for incontinence   - Turn and reposition patient  - Assist with mobility/ambulation  - Relieve pressure over bony prominences  - Avoid friction and shearing  - Provide appropriate hygiene as needed including keeping skin clean and dry  - Evaluate need for skin moisturizer/barrier cream  - Collaborate with interdisciplinary team   - Patient/family teaching  - Consider wound care consult   Outcome: Progressing     Problem: Potential for Falls  Goal: Patient will remain free of falls  Description: INTERVENTIONS:  - Educate patient/family on patient safety including physical limitations  - Instruct patient to call for assistance with activity   - Consult OT/PT to assist with strengthening/mobility   - Keep Call bell within reach  - Keep bed low and locked with side rails adjusted as appropriate  - Keep care items and personal belongings within reach  - Initiate and maintain comfort rounds  - Make Fall Risk Sign visible to staff  - Offer Toileting every 1 Hours, in advance of need  - Initiate/Maintain alarm  - Obtain necessary fall risk management equipment  - Apply yellow socks and bracelet for high fall risk patients  - Consider moving patient to room near nurses station  Outcome: Progressing     Problem: MOBILITY - ADULT  Goal: Maintain or return to baseline ADL function  Description: INTERVENTIONS:  -  Assess patient's ability to carry out ADLs; assess patient's baseline for ADL function and identify physical deficits which impact ability to perform ADLs (bathing, care of mouth/teeth, toileting, grooming, dressing, etc )  - Assess/evaluate cause of self-care deficits   - Assess range of motion  - Assess patient's mobility; develop plan if impaired  - Assess patient's need for assistive devices and provide as appropriate  - Encourage maximum independence but intervene and supervise when necessary  - Involve family in performance of ADLs  - Assess for home care needs following discharge   - Consider OT consult to assist with ADL evaluation and planning for discharge  - Provide patient education as appropriate  Outcome: Progressing  Goal: Maintains/Returns to pre admission functional level  Description: INTERVENTIONS:  - Perform BMAT or MOVE assessment daily    - Set and communicate daily mobility goal to care team and patient/family/caregiver  - Collaborate with rehabilitation services on mobility goals if consulted  - Perform Range of Motion 3 times a day  - Reposition patient every 2 hours  - Dangle patient 3 times a day  - Stand patient 3 times a day  - Ambulate patient 3 times a day  - Out of bed to chair 3 times a day   - Out of bed for meals 3 times a day  - Out of bed for toileting  - Record patient progress and toleration of activity level   Outcome: Progressing     Problem: Nutrition/Hydration-ADULT  Goal: Nutrient/Hydration intake appropriate for improving, restoring or maintaining nutritional needs  Description: Monitor and assess patient's nutrition/hydration status for malnutrition  Collaborate with interdisciplinary team and initiate plan and interventions as ordered  Monitor patient's weight and dietary intake as ordered or per policy  Utilize nutrition screening tool and intervene as necessary  Determine patient's food preferences and provide high-protein, high-caloric foods as appropriate       INTERVENTIONS:  - Monitor oral intake, urinary output, labs, and treatment plans  - Assess nutrition and hydration status and recommend course of action  - Evaluate amount of meals eaten  - Assist patient with eating if necessary   - Allow adequate time for meals  - Recommend/ encourage appropriate diets, oral nutritional supplements, and vitamin/mineral supplements  - Order, calculate, and assess calorie counts as needed  - Recommend, monitor, and adjust tube feedings and TPN/PPN based on assessed needs  - Assess need for intravenous fluids  - Provide specific nutrition/hydration education as appropriate  - Include patient/family/caregiver in decisions related to nutrition  Outcome: Progressing     Problem: PAIN - ADULT  Goal: Verbalizes/displays adequate comfort level or baseline comfort level  Description: Interventions:  - Encourage patient to monitor pain and request assistance  - Assess pain using appropriate pain scale  - Administer analgesics based on type and severity of pain and evaluate response  - Implement non-pharmacological measures as appropriate and evaluate response  - Consider cultural and social influences on pain and pain management  - Notify physician/advanced practitioner if interventions unsuccessful or patient reports new pain  Outcome: Progressing     Problem: INFECTION - ADULT  Goal: Absence or prevention of progression during hospitalization  Description: INTERVENTIONS:  - Assess and monitor for signs and symptoms of infection  - Monitor lab/diagnostic results  - Monitor all insertion sites, i e  indwelling lines, tubes, and drains  - Monitor endotracheal if appropriate and nasal secretions for changes in amount and color  - [place] appropriate cooling/warming therapies per order  - Administer medications as ordered  - Instruct and encourage patient and family to use good hand hygiene technique  - Identify and instruct in appropriate isolation precautions for identified infection/condition  Outcome: Progressing     Problem: DISCHARGE PLANNING  Goal: Discharge to home or other facility with appropriate resources  Description: INTERVENTIONS:  - Identify barriers to discharge w/patient and caregiver  - Arrange for needed discharge resources and transportation as appropriate  - Identify discharge learning needs (meds, wound care, etc )  - Arrange for interpretive services to assist at discharge as needed  - Refer to Case Management Department for coordinating discharge planning if the patient needs post-hospital services based on physician/advanced practitioner order or complex needs related to functional status, cognitive ability, or social support system  Outcome: Progressing     Problem: Knowledge Deficit  Goal: Patient/family/caregiver demonstrates understanding of disease process, treatment plan, medications, and discharge instructions  Description: Complete learning assessment and assess knowledge base    Interventions:  - Provide teaching at level of understanding  - Provide teaching via preferred learning methods  Outcome: Progressing     Problem: CARDIOVASCULAR - ADULT  Goal: Maintains optimal cardiac output and hemodynamic stability  Description: INTERVENTIONS:  - Monitor I/O, vital signs and rhythm  - Monitor for S/S and trends of decreased cardiac output  - Administer and titrate ordered vasoactive medications to optimize hemodynamic stability  - Assess quality of pulses, skin color and temperature  - Assess for signs of decreased coronary artery perfusion  - Instruct patient to report change in severity of symptoms  Outcome: Progressing  Goal: Absence of cardiac dysrhythmias or at baseline rhythm  Description: INTERVENTIONS:  - Continuous cardiac monitoring, vital signs, obtain 12 lead EKG if ordered  - Administer antiarrhythmic and heart rate control medications as ordered  - Monitor electrolytes and administer replacement therapy as ordered  Outcome: Progressing     Problem: RESPIRATORY - ADULT  Goal: Achieves optimal ventilation and oxygenation  Description: INTERVENTIONS:  - Assess for changes in respiratory status  - Assess for changes in mentation and behavior  - Position to facilitate oxygenation and minimize respiratory effort  - Oxygen administered by appropriate delivery if ordered  - Initiate smoking cessation education as indicated  - Encourage broncho-pulmonary hygiene including cough, deep breathe, Incentive Spirometry  - Assess the need for suctioning and aspirate as needed  - Assess and instruct to report SOB or any respiratory difficulty  - Respiratory Therapy support as indicated  Outcome: Progressing     Problem: GASTROINTESTINAL - ADULT  Goal: Minimal or absence of nausea and/or vomiting  Description: INTERVENTIONS:  - Administer IV fluids if ordered to ensure adequate hydration  - Maintain NPO status until nausea and vomiting are resolved  - Nasogastric tube if ordered  - Administer ordered antiemetic medications as needed  - Provide nonpharmacologic comfort measures as appropriate  - Advance diet as tolerated, if ordered  - Consider nutrition services referral to assist patient with adequate nutrition and appropriate food choices  Outcome: Progressing  Goal: Maintains or returns to baseline bowel function  Description: INTERVENTIONS:  - Assess bowel function  - Encourage oral fluids to ensure adequate hydration  - Administer IV fluids if ordered to ensure adequate hydration  - Administer ordered medications as needed  - Encourage mobilization and activity  - Consider nutritional services referral to assist patient with adequate nutrition and appropriate food choices  Outcome: Progressing  Goal: Maintains adequate nutritional intake  Description: INTERVENTIONS:  - Monitor percentage of each meal consumed  - Identify factors contributing to decreased intake, treat as appropriate  - Assist with meals as needed  - Monitor I&O, weight, and lab values if indicated  - Obtain nutrition services referral as needed  Outcome: Progressing  Goal: Establish and maintain optimal ostomy function  Description: INTERVENTIONS:  - Assess bowel function  - Encourage oral fluids to ensure adequate hydration  - Administer IV fluids if ordered to ensure adequate hydration   - Administer ordered medications as needed  - Encourage mobilization and activity  - Nutrition services referral to assist patient with appropriate food choices  - Assess stoma site  - Consider wound care consult   Outcome: Progressing  Goal: Oral mucous membranes remain intact  Description: INTERVENTIONS  - Assess oral mucosa and hygiene practices  - Implement preventative oral hygiene regimen  - Implement oral medicated treatments as ordered  - Initiate Nutrition services referral as needed  Outcome: Progressing     Problem: GASTROINTESTINAL - ADULT  Goal: Maintains or returns to baseline bowel function  Description: INTERVENTIONS:  - Assess bowel function  - Encourage oral fluids to ensure adequate hydration  - Administer IV fluids if ordered to ensure adequate hydration  - Administer ordered medications as needed  - Encourage mobilization and activity  - Consider nutritional services referral to assist patient with adequate nutrition and appropriate food choices  Outcome: Progressing     Problem: GENITOURINARY - ADULT  Goal: Maintains or returns to baseline urinary function  Description: INTERVENTIONS:  - Assess urinary function  - Encourage oral fluids to ensure adequate hydration if ordered  - Administer IV fluids as ordered to ensure adequate hydration  - Administer ordered medications as needed  - Offer frequent toileting  - Follow urinary retention protocol if ordered  Outcome: Progressing  Goal: Absence of urinary retention  Description: INTERVENTIONS:  - Assess patients ability to void and empty bladder  - Monitor I/O  - Bladder scan as needed  - Discuss with physician/AP medications to alleviate retention as needed  - Discuss catheterization for long term situations as appropriate  Outcome: Progressing  Goal: Urinary catheter remains patent  Description: INTERVENTIONS:  - Assess patency of urinary catheter  - If patient has a chronic carmen, consider changing catheter if non-functioning  - Follow guidelines for intermittent irrigation of non-functioning urinary catheter  Outcome: Progressing     Problem: SKIN/TISSUE INTEGRITY - ADULT  Goal: Skin Integrity remains intact(Skin Breakdown Prevention)  Description: Assess:  -Perform Arnoldo assessment every   -Clean and moisturize skin every   -Inspect skin when repositioning, toileting, and assisting with ADLS  -Assess under medical devices such as  every   -Assess extremities for adequate circulation and sensation     Bed Management:  -Have minimal linens on bed & keep smooth, unwrinkled  -Change linens as needed when moist or perspiring  -Avoid sitting or lying in one position for more than  hours while in bed  -Keep HOB at degrees     Toileting:  -Offer bedside commode  -Assess for incontinence every   -Use incontinent care products after each incontinent episode such as     Activity:  -Mobilize patient  times a day  -Encourage activity and walks on unit  -Encourage or provide ROM exercises   -Turn and reposition patient every  Hours  -Use appropriate equipment to lift or move patient in bed  -Instruct/ Assist with weight shifting every  when out of bed in chair  -Consider limitation of chair time  hour intervals    Skin Care:  -Avoid use of baby powder, tape, friction and shearing, hot water or constrictive clothing  -Relieve pressure over bony prominences using   -Do not massage red bony areas    Next Steps:  -Teach patient strategies to minimize risks such as    -Consider consults to  interdisciplinary teams such as   Outcome: Progressing  Goal: Incision(s), wounds(s) or drain site(s) healing without S/S of infection  Description: INTERVENTIONS  - Assess and document dressing, incision, wound bed, drain sites and surrounding tissue  - Provide patient and family education  - Perform skin care/dressing changes every   Outcome: Progressing  Goal: Pressure injury heals and does not worsen  Description: Interventions:  - Implement low air loss mattress or specialty surface (Criteria met)  - Apply silicone foam dressing  - Instruct/assist with weight shifting every  minutes when in chair   - Limit chair time to  hour intervals  - Use special pressure reducing interventions such as  when in chair   - Apply fecal or urinary incontinence containment device   - Perform passive or active ROM every   - Turn and reposition patient & offload bony prominences every  hours   - Utilize friction reducing device or surface for transfers   - Consider consults to  interdisciplinary teams such as   - Use incontinent care products after each incontinent episode such   - Consider nutrition services referral as needed  Outcome: Progressing

## 2022-07-04 NOTE — PROCEDURES
Acute Care Surgery  Bedside V A C  Procedure Note    Location of wound: midline abdomen    Dressings and Foam removed:  0 Dressings  1 Black Foam  7 White Foam    Dimensions of wound: 31 cm x 21 cm x 0 5 cm    VAC dressing application:  The periwound skin was cleaned and dried  0 dressings, 1 black foam, 9 white foam were cut to size of the wound and placed into the wound  The dressings were then covered with VAC drape  The track pad was then placed over the center of the black foam  The VAC was then set to 125 mmHg low Continuous suction  The patient tolerated the procedure well and there were no complications  The patient did not require any excisional debridement during today's dressing change  VAC settings:  125 mmHg  Continuous    Additional Notes: The [facility] sticker placed over the dressing per protocol  Dr Wilton Geronimo and Dr Nazanin Her were present for the dressing change      Eugenia Crump MD  7/4/2022 11:15 AM

## 2022-07-05 ENCOUNTER — ANESTHESIA (INPATIENT)
Dept: PERIOP | Facility: HOSPITAL | Age: 52
DRG: 230 | End: 2022-07-05
Payer: COMMERCIAL

## 2022-07-05 ENCOUNTER — ANESTHESIA EVENT (INPATIENT)
Dept: PERIOP | Facility: HOSPITAL | Age: 52
DRG: 230 | End: 2022-07-05
Payer: COMMERCIAL

## 2022-07-05 LAB
ABO GROUP BLD: NORMAL
ANION GAP SERPL CALCULATED.3IONS-SCNC: 2 MMOL/L (ref 4–13)
ANISOCYTOSIS BLD QL SMEAR: PRESENT
APTT PPP: 53 SECONDS (ref 23–37)
APTT PPP: 73 SECONDS (ref 23–37)
APTT PPP: 73 SECONDS (ref 23–37)
BASOPHILS # BLD MANUAL: 0.05 THOUSAND/UL (ref 0–0.1)
BASOPHILS NFR MAR MANUAL: 1 % (ref 0–1)
BLD GP AB SCN SERPL QL: NEGATIVE
BUN SERPL-MCNC: 18 MG/DL (ref 5–25)
CALCIUM SERPL-MCNC: 8.6 MG/DL (ref 8.3–10.1)
CHLORIDE SERPL-SCNC: 90 MMOL/L (ref 100–108)
CO2 SERPL-SCNC: 38 MMOL/L (ref 21–32)
CREAT SERPL-MCNC: 0.64 MG/DL (ref 0.6–1.3)
EOSINOPHIL # BLD MANUAL: 0.24 THOUSAND/UL (ref 0–0.4)
EOSINOPHIL NFR BLD MANUAL: 5 % (ref 0–6)
ERYTHROCYTE [DISTWIDTH] IN BLOOD BY AUTOMATED COUNT: 15.5 % (ref 11.6–15.1)
GFR SERPL CREATININE-BSD FRML MDRD: 113 ML/MIN/1.73SQ M
GLUCOSE SERPL-MCNC: 120 MG/DL (ref 65–140)
GLUCOSE SERPL-MCNC: 132 MG/DL (ref 65–140)
GLUCOSE SERPL-MCNC: 133 MG/DL (ref 65–140)
GLUCOSE SERPL-MCNC: 147 MG/DL (ref 65–140)
GLUCOSE SERPL-MCNC: 172 MG/DL (ref 65–140)
HCT VFR BLD AUTO: 28.2 % (ref 36.5–49.3)
HGB BLD-MCNC: 8.6 G/DL (ref 12–17)
LYMPHOCYTES # BLD AUTO: 1.57 THOUSAND/UL (ref 0.6–4.47)
LYMPHOCYTES # BLD AUTO: 33 % (ref 14–44)
MACROCYTES BLD QL AUTO: PRESENT
MAGNESIUM SERPL-MCNC: 2.1 MG/DL (ref 1.6–2.6)
MCH RBC QN AUTO: 29 PG (ref 26.8–34.3)
MCHC RBC AUTO-ENTMCNC: 30.5 G/DL (ref 31.4–37.4)
MCV RBC AUTO: 95 FL (ref 82–98)
MONOCYTES # BLD AUTO: 0.19 THOUSAND/UL (ref 0–1.22)
MONOCYTES NFR BLD: 4 % (ref 4–12)
MYELOCYTES NFR BLD MANUAL: 3 % (ref 0–1)
NEUTROPHILS # BLD MANUAL: 2.58 THOUSAND/UL (ref 1.85–7.62)
NEUTS BAND NFR BLD MANUAL: 3 % (ref 0–8)
NEUTS SEG NFR BLD AUTO: 51 % (ref 43–75)
PHOSPHATE SERPL-MCNC: 3.2 MG/DL (ref 2.7–4.5)
PLATELET # BLD AUTO: 235 THOUSANDS/UL (ref 149–390)
PLATELET BLD QL SMEAR: ADEQUATE
PMV BLD AUTO: 10.8 FL (ref 8.9–12.7)
POLYCHROMASIA BLD QL SMEAR: PRESENT
POTASSIUM SERPL-SCNC: 3.6 MMOL/L (ref 3.5–5.3)
RBC # BLD AUTO: 2.97 MILLION/UL (ref 3.88–5.62)
RH BLD: POSITIVE
SODIUM SERPL-SCNC: 130 MMOL/L (ref 136–145)
SPECIMEN EXPIRATION DATE: NORMAL
WBC # BLD AUTO: 4.77 THOUSAND/UL (ref 4.31–10.16)

## 2022-07-05 PROCEDURE — 86900 BLOOD TYPING SEROLOGIC ABO: CPT | Performed by: SURGERY

## 2022-07-05 PROCEDURE — 15100 SPLT AGRFT T/A/L 1ST 100SQCM: CPT | Performed by: SURGERY

## 2022-07-05 PROCEDURE — 99024 POSTOP FOLLOW-UP VISIT: CPT

## 2022-07-05 PROCEDURE — 2W03X6Z CHANGE PRESSURE DRESSING ON ABDOMINAL WALL: ICD-10-PCS | Performed by: SURGERY

## 2022-07-05 PROCEDURE — 15003 WOUND PREP ADDL 100 CM: CPT | Performed by: SURGERY

## 2022-07-05 PROCEDURE — 85007 BL SMEAR W/DIFF WBC COUNT: CPT | Performed by: SURGERY

## 2022-07-05 PROCEDURE — 0HB7XZZ EXCISION OF ABDOMEN SKIN, EXTERNAL APPROACH: ICD-10-PCS | Performed by: SURGERY

## 2022-07-05 PROCEDURE — 80048 BASIC METABOLIC PNL TOTAL CA: CPT | Performed by: SURGERY

## 2022-07-05 PROCEDURE — 15002 WOUND PREP TRK/ARM/LEG: CPT | Performed by: SURGERY

## 2022-07-05 PROCEDURE — 99232 SBSQ HOSP IP/OBS MODERATE 35: CPT | Performed by: INTERNAL MEDICINE

## 2022-07-05 PROCEDURE — 15273 SKIN SUB GRFT T/ARM/LG CHILD: CPT | Performed by: SURGERY

## 2022-07-05 PROCEDURE — 85730 THROMBOPLASTIN TIME PARTIAL: CPT | Performed by: SURGERY

## 2022-07-05 PROCEDURE — 15274 SKN SUB GRFT T/A/L CHILD ADD: CPT | Performed by: SURGERY

## 2022-07-05 PROCEDURE — 0HBLXZZ EXCISION OF LEFT LOWER LEG SKIN, EXTERNAL APPROACH: ICD-10-PCS | Performed by: SURGERY

## 2022-07-05 PROCEDURE — 94760 N-INVAS EAR/PLS OXIMETRY 1: CPT

## 2022-07-05 PROCEDURE — 84100 ASSAY OF PHOSPHORUS: CPT | Performed by: SURGERY

## 2022-07-05 PROCEDURE — 82948 REAGENT STRIP/BLOOD GLUCOSE: CPT

## 2022-07-05 PROCEDURE — 94660 CPAP INITIATION&MGMT: CPT

## 2022-07-05 PROCEDURE — 15101 SPLT AGRFT T/A/L EA ADDL 100: CPT | Performed by: SURGERY

## 2022-07-05 PROCEDURE — 85027 COMPLETE CBC AUTOMATED: CPT | Performed by: SURGERY

## 2022-07-05 PROCEDURE — 94640 AIRWAY INHALATION TREATMENT: CPT

## 2022-07-05 PROCEDURE — 0HR7X74 REPLACEMENT OF ABDOMEN SKIN WITH AUTOLOGOUS TISSUE SUBSTITUTE, PARTIAL THICKNESS, EXTERNAL APPROACH: ICD-10-PCS | Performed by: SURGERY

## 2022-07-05 PROCEDURE — 0HRLXK4 REPLACEMENT OF LEFT LOWER LEG SKIN WITH NONAUTOLOGOUS TISSUE SUBSTITUTE, PARTIAL THICKNESS, EXTERNAL APPROACH: ICD-10-PCS | Performed by: SURGERY

## 2022-07-05 PROCEDURE — 86901 BLOOD TYPING SEROLOGIC RH(D): CPT | Performed by: SURGERY

## 2022-07-05 PROCEDURE — 83735 ASSAY OF MAGNESIUM: CPT | Performed by: SURGERY

## 2022-07-05 PROCEDURE — 86850 RBC ANTIBODY SCREEN: CPT | Performed by: SURGERY

## 2022-07-05 DEVICE — OASIS ULTRA TRI-LAYER MATRIX
Type: IMPLANTABLE DEVICE | Site: LEG | Status: FUNCTIONAL
Brand: OASIS

## 2022-07-05 RX ORDER — LIDOCAINE HYDROCHLORIDE AND EPINEPHRINE 10; 10 MG/ML; UG/ML
INJECTION, SOLUTION INFILTRATION; PERINEURAL AS NEEDED
Status: DISCONTINUED | OUTPATIENT
Start: 2022-07-05 | End: 2022-07-05 | Stop reason: HOSPADM

## 2022-07-05 RX ORDER — ONDANSETRON 2 MG/ML
INJECTION INTRAMUSCULAR; INTRAVENOUS AS NEEDED
Status: DISCONTINUED | OUTPATIENT
Start: 2022-07-05 | End: 2022-07-05

## 2022-07-05 RX ORDER — NOREPINEPHRINE BITARTRATE 1 MG/ML
INJECTION, SOLUTION INTRAVENOUS AS NEEDED
Status: DISCONTINUED | OUTPATIENT
Start: 2022-07-05 | End: 2022-07-05

## 2022-07-05 RX ORDER — FENTANYL CITRATE 50 UG/ML
INJECTION, SOLUTION INTRAMUSCULAR; INTRAVENOUS AS NEEDED
Status: DISCONTINUED | OUTPATIENT
Start: 2022-07-05 | End: 2022-07-05

## 2022-07-05 RX ORDER — CEFAZOLIN SODIUM 1 G/3ML
INJECTION, POWDER, FOR SOLUTION INTRAMUSCULAR; INTRAVENOUS AS NEEDED
Status: DISCONTINUED | OUTPATIENT
Start: 2022-07-05 | End: 2022-07-05

## 2022-07-05 RX ORDER — METHOCARBAMOL 750 MG/1
750 TABLET, FILM COATED ORAL EVERY 6 HOURS SCHEDULED
Status: DISCONTINUED | OUTPATIENT
Start: 2022-07-05 | End: 2022-07-19 | Stop reason: HOSPADM

## 2022-07-05 RX ORDER — ACETAMINOPHEN 325 MG/1
975 TABLET ORAL EVERY 8 HOURS SCHEDULED
Status: DISCONTINUED | OUTPATIENT
Start: 2022-07-05 | End: 2022-07-19 | Stop reason: HOSPADM

## 2022-07-05 RX ORDER — KETAMINE HCL IN NACL, ISO-OSM 100MG/10ML
SYRINGE (ML) INJECTION AS NEEDED
Status: DISCONTINUED | OUTPATIENT
Start: 2022-07-05 | End: 2022-07-05

## 2022-07-05 RX ORDER — LIDOCAINE HYDROCHLORIDE 10 MG/ML
INJECTION, SOLUTION EPIDURAL; INFILTRATION; INTRACAUDAL; PERINEURAL AS NEEDED
Status: DISCONTINUED | OUTPATIENT
Start: 2022-07-05 | End: 2022-07-05

## 2022-07-05 RX ORDER — MAGNESIUM HYDROXIDE 1200 MG/15ML
LIQUID ORAL AS NEEDED
Status: DISCONTINUED | OUTPATIENT
Start: 2022-07-05 | End: 2022-07-05 | Stop reason: HOSPADM

## 2022-07-05 RX ORDER — ONDANSETRON 2 MG/ML
4 INJECTION INTRAMUSCULAR; INTRAVENOUS ONCE AS NEEDED
Status: DISCONTINUED | OUTPATIENT
Start: 2022-07-05 | End: 2022-07-05 | Stop reason: HOSPADM

## 2022-07-05 RX ORDER — LIDOCAINE HYDROCHLORIDE 10 MG/ML
INJECTION, SOLUTION EPIDURAL; INFILTRATION; INTRACAUDAL; PERINEURAL AS NEEDED
Status: DISCONTINUED | OUTPATIENT
Start: 2022-07-05 | End: 2022-07-05 | Stop reason: HOSPADM

## 2022-07-05 RX ORDER — SUCCINYLCHOLINE/SOD CL,ISO/PF 100 MG/5ML
SYRINGE (ML) INTRAVENOUS AS NEEDED
Status: DISCONTINUED | OUTPATIENT
Start: 2022-07-05 | End: 2022-07-05

## 2022-07-05 RX ORDER — MINERAL OIL
OIL (ML) MISCELLANEOUS AS NEEDED
Status: DISCONTINUED | OUTPATIENT
Start: 2022-07-05 | End: 2022-07-05 | Stop reason: HOSPADM

## 2022-07-05 RX ORDER — ALBUMIN, HUMAN INJ 5% 5 %
SOLUTION INTRAVENOUS CONTINUOUS PRN
Status: DISCONTINUED | OUTPATIENT
Start: 2022-07-05 | End: 2022-07-05

## 2022-07-05 RX ORDER — FENTANYL CITRATE/PF 50 MCG/ML
25 SYRINGE (ML) INJECTION
Status: DISCONTINUED | OUTPATIENT
Start: 2022-07-05 | End: 2022-07-05 | Stop reason: HOSPADM

## 2022-07-05 RX ORDER — POTASSIUM CHLORIDE 20 MEQ/1
40 TABLET, EXTENDED RELEASE ORAL ONCE
Status: COMPLETED | OUTPATIENT
Start: 2022-07-05 | End: 2022-07-05

## 2022-07-05 RX ORDER — ROCURONIUM BROMIDE 10 MG/ML
INJECTION, SOLUTION INTRAVENOUS AS NEEDED
Status: DISCONTINUED | OUTPATIENT
Start: 2022-07-05 | End: 2022-07-05

## 2022-07-05 RX ORDER — PROPOFOL 10 MG/ML
INJECTION, EMULSION INTRAVENOUS AS NEEDED
Status: DISCONTINUED | OUTPATIENT
Start: 2022-07-05 | End: 2022-07-05

## 2022-07-05 RX ADMIN — NOREPINEPHRINE BITARTRATE 5 MCG/MIN: 1 INJECTION, SOLUTION, CONCENTRATE INTRAVENOUS at 14:26

## 2022-07-05 RX ADMIN — FENTANYL CITRATE 100 MCG: 50 INJECTION INTRAMUSCULAR; INTRAVENOUS at 14:18

## 2022-07-05 RX ADMIN — HEPARIN SODIUM 5000 UNITS: 1000 INJECTION INTRAVENOUS; SUBCUTANEOUS at 11:56

## 2022-07-05 RX ADMIN — ROCURONIUM BROMIDE 40 MG: 10 INJECTION INTRAVENOUS at 14:21

## 2022-07-05 RX ADMIN — Medication 30 MG: at 14:39

## 2022-07-05 RX ADMIN — SENNOSIDES AND DOCUSATE SODIUM 1 TABLET: 8.6; 5 TABLET ORAL at 18:24

## 2022-07-05 RX ADMIN — POTASSIUM CHLORIDE 40 MEQ: 1500 TABLET, EXTENDED RELEASE ORAL at 11:54

## 2022-07-05 RX ADMIN — METOROPROLOL TARTRATE 5 MG: 5 INJECTION, SOLUTION INTRAVENOUS at 17:36

## 2022-07-05 RX ADMIN — LEVALBUTEROL HYDROCHLORIDE 1.25 MG: 1.25 SOLUTION, CONCENTRATE RESPIRATORY (INHALATION) at 20:30

## 2022-07-05 RX ADMIN — METOCLOPRAMIDE HYDROCHLORIDE 10 MG: 5 INJECTION INTRAMUSCULAR; INTRAVENOUS at 17:39

## 2022-07-05 RX ADMIN — METOCLOPRAMIDE HYDROCHLORIDE 10 MG: 5 INJECTION INTRAMUSCULAR; INTRAVENOUS at 23:24

## 2022-07-05 RX ADMIN — FENTANYL CITRATE 25 MCG: 50 INJECTION INTRAMUSCULAR; INTRAVENOUS at 17:32

## 2022-07-05 RX ADMIN — ACETAMINOPHEN 975 MG: 325 TABLET, FILM COATED ORAL at 21:27

## 2022-07-05 RX ADMIN — INSULIN LISPRO 1 UNITS: 100 INJECTION, SOLUTION INTRAVENOUS; SUBCUTANEOUS at 17:34

## 2022-07-05 RX ADMIN — IPRATROPIUM BROMIDE 0.5 MG: 0.5 SOLUTION RESPIRATORY (INHALATION) at 20:30

## 2022-07-05 RX ADMIN — GABAPENTIN 300 MG: 250 SOLUTION ORAL at 09:23

## 2022-07-05 RX ADMIN — FENTANYL CITRATE 25 MCG: 50 INJECTION INTRAMUSCULAR; INTRAVENOUS at 17:43

## 2022-07-05 RX ADMIN — METOROPROLOL TARTRATE 5 MG: 5 INJECTION, SOLUTION INTRAVENOUS at 23:24

## 2022-07-05 RX ADMIN — SUGAMMADEX 400 MG: 100 INJECTION, SOLUTION INTRAVENOUS at 16:18

## 2022-07-05 RX ADMIN — QUETIAPINE FUMARATE 50 MG: 25 TABLET ORAL at 21:27

## 2022-07-05 RX ADMIN — METOROPROLOL TARTRATE 5 MG: 5 INJECTION, SOLUTION INTRAVENOUS at 11:55

## 2022-07-05 RX ADMIN — LEVALBUTEROL HYDROCHLORIDE 1.25 MG: 1.25 SOLUTION, CONCENTRATE RESPIRATORY (INHALATION) at 07:44

## 2022-07-05 RX ADMIN — METOCLOPRAMIDE HYDROCHLORIDE 10 MG: 5 INJECTION INTRAMUSCULAR; INTRAVENOUS at 11:55

## 2022-07-05 RX ADMIN — Medication 180 MG: at 14:19

## 2022-07-05 RX ADMIN — METOCLOPRAMIDE HYDROCHLORIDE 10 MG: 5 INJECTION INTRAMUSCULAR; INTRAVENOUS at 06:35

## 2022-07-05 RX ADMIN — BUDESONIDE AND FORMOTEROL FUMARATE DIHYDRATE 2 PUFF: 160; 4.5 AEROSOL RESPIRATORY (INHALATION) at 09:23

## 2022-07-05 RX ADMIN — PROPOFOL 280 MG: 10 INJECTION, EMULSION INTRAVENOUS at 14:18

## 2022-07-05 RX ADMIN — HEPARIN SODIUM 22 UNITS/KG/HR: 10000 INJECTION, SOLUTION INTRAVENOUS at 12:16

## 2022-07-05 RX ADMIN — METOROPROLOL TARTRATE 5 MG: 5 INJECTION, SOLUTION INTRAVENOUS at 06:35

## 2022-07-05 RX ADMIN — ONDANSETRON 4 MG: 2 INJECTION INTRAMUSCULAR; INTRAVENOUS at 16:40

## 2022-07-05 RX ADMIN — DIGOXIN 250 MCG: 0.25 INJECTION INTRAMUSCULAR; INTRAVENOUS at 09:23

## 2022-07-05 RX ADMIN — IPRATROPIUM BROMIDE 0.5 MG: 0.5 SOLUTION RESPIRATORY (INHALATION) at 07:44

## 2022-07-05 RX ADMIN — OXYCODONE HYDROCHLORIDE 5 MG: 5 SOLUTION ORAL at 09:36

## 2022-07-05 RX ADMIN — FENTANYL CITRATE 25 MCG: 50 INJECTION INTRAMUSCULAR; INTRAVENOUS at 17:25

## 2022-07-05 RX ADMIN — ALBUMIN (HUMAN): 12.5 INJECTION, SOLUTION INTRAVENOUS at 15:10

## 2022-07-05 RX ADMIN — CEFAZOLIN 3000 MG: 1 INJECTION, POWDER, FOR SOLUTION INTRAMUSCULAR; INTRAVENOUS at 14:27

## 2022-07-05 RX ADMIN — LIDOCAINE HYDROCHLORIDE 50 MG: 10 INJECTION, SOLUTION EPIDURAL; INFILTRATION; INTRACAUDAL at 14:18

## 2022-07-05 RX ADMIN — Medication 20 MG: at 15:45

## 2022-07-05 RX ADMIN — OXYCODONE HYDROCHLORIDE 10 MG: 5 SOLUTION ORAL at 18:24

## 2022-07-05 RX ADMIN — HEPARIN SODIUM 22 UNITS/KG/HR: 10000 INJECTION, SOLUTION INTRAVENOUS at 17:10

## 2022-07-05 RX ADMIN — NOREPINEPHRINE BITARTRATE 16 MCG: 1 INJECTION, SOLUTION, CONCENTRATE INTRAVENOUS at 14:38

## 2022-07-05 RX ADMIN — FENTANYL CITRATE 25 MCG: 50 INJECTION INTRAMUSCULAR; INTRAVENOUS at 17:17

## 2022-07-05 RX ADMIN — NOREPINEPHRINE BITARTRATE 16 MCG: 1 INJECTION, SOLUTION, CONCENTRATE INTRAVENOUS at 14:23

## 2022-07-05 RX ADMIN — HEPARIN SODIUM 20 UNITS/KG/HR: 10000 INJECTION, SOLUTION INTRAVENOUS at 03:23

## 2022-07-05 RX ADMIN — GABAPENTIN 300 MG: 250 SOLUTION ORAL at 21:27

## 2022-07-05 RX ADMIN — METHOCARBAMOL TABLETS 750 MG: 750 TABLET, COATED ORAL at 18:24

## 2022-07-05 NOTE — NUTRITION
07/05/22 1224   Recommendations/Interventions   Summary NPO for OR today, TPN to run out and then be discontinued  Patient to transition to po intake after surgery today per chart review  Patient is agreeable to clear liquid nutrition supplements once diet advanced  Interventions/Recommendations Other (Specify)   Recommendations to Provider When medically indicated suggest advance diet to clear liquid with Ensure clear liquid TID and Gelatein TID  Once clear liquid diet tolerated increase to Level 2 Dysphagia, thin liquid with Ensure Max BID

## 2022-07-05 NOTE — WOUND OSTOMY CARE
Progress Note - Wound   Deveron Ledy 47 y o  male MRN: [id number]  Unit/Bed#: OR [place] Encounter: [id number]        Assessment:   Patient is seen for wound care follow-up  Patient admitted to [facility] for treatment of small bowel obstruction  Patient is in bariatric kreg bed on low air loss mattress, patient is max assist with turning and repositioning  Patient has external urinary catheter and is incontinent of bowel  Patient going to OR today for skin graft on stomach  Findings:  1  Posterior neck wound- full thickness dehisced sutured wound with beefy red, yellow wound bed  Morena-wound is fragile and pink  Moderate serosanguineous drainage noted    New dressing applied per orders listed below  Patient tolerated well- no s/s of non-verbal pain or discomfort observed during the encounter  Bedside nurse aware of plan of care  See flow sheets for more detailed assessment findings  Wound care will continue to follow  Skin care plans:  1-Hydraguard to bilateral sacrum, buttock and heels BID and PRN  2-Elevate heels to offload pressure  3-Ehob cushion in chair when out of bed  4-Moisturize skin daily with skin nourishing cream   5-Turn/reposition q2h or when medically stable for pressure re-distribution on skin  -Irrigate wound to posterior neck with normal saline, apply silver alginate, cover with Allevyn foam  Change every other day and PRN soilage/displacement           Wound 06/15/22 Neck Posterior (Active)   Wound Image   07/05/22 1211   Wound Description Beefy red 07/05/22 1211   Morena-wound Assessment Intact;Fragile;Pink 07/05/22 1211   Wound Length (cm) 2 cm 07/05/22 1211   Wound Width (cm) 2 5 cm 07/05/22 1211   Wound Depth (cm) 0 3 cm 07/05/22 1211   Wound Surface Area (cm^2) 5 cm^2 07/05/22 1211   Wound Volume (cm^3) 1 5 cm^3 07/05/22 1211   Calculated Wound Volume (cm^3) 1 5 cm^3 07/05/22 1211   Change in Wound Size % -150 07/05/22 1211   Tunneling 0 cm 07/05/22 1211   Tunneling in depth located at 0 06/29/22 1400   Undermining 0 06/29/22 1400   Undermining is depth extending from 0 06/29/22 1400   Wound Site Closure Sutures 07/04/22 0000   Drainage Amount Moderate 07/05/22 1211   Drainage Description Serosanguineous 07/05/22 1211   Non-staged Wound Description Full thickness 07/05/22 1211   Treatments Irrigation with NSS 07/05/22 1211   Dressing Calcium Alginate with Silver; Foam, Silicon (eg  Allevyn, etc) 07/05/22 1211   Wound packed? No 07/05/22 1211   Packing- # removed 0 06/29/22 1400   Packing- # inserted 0 06/29/22 1400   Dressing Changed Changed 07/05/22 1211   Patient Tolerance Tolerated well 07/05/22 1211   Dressing Status Clean;Dry; Intact 07/05/22 1211        Patient assessed and seen with [address] , BSN, [address] [facility] [address] Lyn Mott RN, BSN

## 2022-07-05 NOTE — UTILIZATION REVIEW
Continued Stay Review    Date: 7/4/22                    Current Patient Class: Inpatient Current Level of Care: Level 2 Stepdown    HPI:52 y o  male initially admitted on 6/14/22 7/4 General Surgery:  55yo male p/w SBO secondary to chronic incarcerated ventral hernia s/p 6/14  ex lap, HARVEY, also s/p abdominal washout, repair of serosal tears  6/15 washout, Abthera, VAC DPR  6/17 bridging vicryl mesh placement, wound vac placement  6/30 I&D, washout and vac placement  Yesterday, episodes of tachycardia and hypoxia, unable to get CT 2/2 body habitus  ECHO-no evidence of right heart strain  SOB has improved  Tolerating clears, passing flatus, 2 BM yesterday  Plan: Continue clears this a m , slowly advance, continue Reglan and aggressive bowel regimen, cotinue TPN until tolerating PO, continue heparin drip  VAC change at bedside today  NPO AT MN for OR tomorrow for STSG ( skin graft split thickness abdomen)  PE:  AOx3, abdomen soft, protuberant, non-tender, non-distended  Wound VAC in place with good seal   Cardiology:  AFib with moderate ventricular response on telemetry  Continue IV cardiac medication until it is clear he is tolerating PO  Check digoxin level later in the week           Vital Signs:       Date/Time Temp Pulse Resp BP MAP (mmHg) SpO2 Calculated FIO2 (%) - Nasal Cannula Nasal Cannula O2 Flow Rate (L/min) O2 Device   07/04/22 22:28:27 99 2 °F (37 3 °C) 92 -- 113/67 82 98 % -- -- --   07/04/22 2042 -- -- -- -- -- 99 % 36 4 L/min Nasal cannula   07/04/22 18:53:45 98 4 °F (36 9 °C) 83 16 104/53 70 99 % -- -- --   07/04/22 15:21:16 98 °F (36 7 °C) 95 16 119/66 84 97 % -- -- --   07/04/22 1432 -- -- -- -- -- 98 % -- -- --   07/04/22 1200 -- -- -- -- -- -- 36 4 L/min Nasal cannula   07/04/22 11:55:28 98 1 °F (36 7 °C) -- 19 122/66 85 -- -- -- --   07/04/22 0724 -- -- -- -- -- 97 % -- -- --   07/04/22 0705 -- -- -- -- -- 96 % -- -- --   07/04/22 0235 -- 101 -- 104/70 81 97 % -- -- --   07/03/22 2358 97 4 °F (36 3 °C) Abnormal  68 -- -- -- 97 % -- -- --   07/03/22 2307 -- -- -- -- -- -- -- -- --   07/03/22 22:24:10 -- 91 16 104/64 77 97 % -- -- --   07/03/22 1939 -- 100 -- 92/53 66 99 % -- -- --   07/03/22 19:16:08 96 3 °F (35 7 °C) Abnormal  101 16 88/53 Abnormal  65 97 % -- -- --   07/03/22 1600 -- -- -- -- -- 98 % -- -- Nasal cannula   07/03/22 15:27:59 97 7 °F (36 5 °C) 106 Abnormal  -- 126/65 85 98 % -- -- --   07/03/22 15:27:16 97 7 °F (36 5 °C) 104 -- 126/65 85 98 % -- -- --   07/03/22 13:03:29 99 2 °F (37 3 °C) 101 -- 117/66 83 98 % -- -- --   07/03/22 0946 -- 124 Abnormal   -- -- -- -- -- -- --   07/03/22 09:34:20 98 1 °F (36 7 °C) 100 -- 115/68 84 98 % 36 4 L/min  Nasal cannula   07/03/22 0933 -- 98 -- 115/68 84 97 % -- -- --   07/03/22 0915 -- -- -- -- -- 98 % 48 7 L/min  Nasal cannula   07/03/22 0913 -- 150 Abnormal   -- -- -- 75 % Abnormal  -- -- Nasal cannula   07/03/22 0814 -- -- -- -- -- 94 % 28 2 L/min Nasal cannula   07/03/22 0805 -- -- -- -- -- 96 % -- -- Nasal cannula   07/03/22 0800 -- -- -- -- -- 96 % -- -- Nasal cannula   07/03/22 07:35:19 98 1 °F (36 7 °C) 110 Abnormal  -- 117/67 84 96 % -- -- --   07/03/22 0400 -- -- -- -- -- -- -- -- --   07/03/22 03:10:48 96 2 °F (35 7 °C) Abnormal  99 18 117/72 87 97 % -- -- --   07/03/22 0000 -- -- -- -- -- -- -- -- --           Pertinent Labs/Diagnostic Results:     7/3 XR abdomen: 1  Contrast opacifies nondilated colon and rectum  No high-grade obstruction noted  2   Endogastric tube terminates in the stomach      7/3 CXR: 1  Technically limited study  2   Mild vascular and  interstitial prominence suggesting mild volume overload  3   Nasogastric tube as noted  7/3 ECHO:      Left Ventricle: Left ventricle is not well visualized  Left ventricular cavity size is mildly dilated  Wall thickness is mildly increased  by visual estimation  Systolic function is mildly reduced  There is mild global hypokinesis    Mitral Valve:  There is mild regurgitation    Tricuspid Valve: There is mild regurgitation    Pericardium: There is a small pericardial effusion      7/3 EKG:      Component Ref Range & Units 7/3/22 0920   Ventricular Rate     Atrial Rate     MI Interval ms    QRSD Interval ms 92    QT Interval ms 280    QTC Interval ms 395    P Axis degrees    QRS Axis degrees 28    T Wave Axis degrees 211           Atrial fibrillation with rapid ventricular response  Low voltage QRS  Nonspecific ST and T wave abnormality  Abnormal ECG  When compared with ECG of 17-JUN-2022 23:31,  No significant change was found  Confirmed by Ros Jensen ([id number]) on 7/3/2022 9:33:10 AM                 Results from last 7 days   Lab Units 07/03/22  1246 07/03/22  0505 07/02/22  0442 07/01/22  0559 06/30/22  0512 06/29/22  0537   WBC Thousand/uL 6 77 6 86 7 00 9 36   < > 8 95   HEMOGLOBIN g/dL 9 1* 9 2* 8 2* 8 6*   < > 8 7*   HEMATOCRIT % 30 1* 29 4* 26 9* 27 9*   < > 28 2*   PLATELETS Thousands/uL 256 246 243 229   < > 249   NEUTROS ABS Thousands/µL 4 51  --   --   --   --   --    BANDS PCT %  --   --   --   --   --  2    < > = values in this interval not displayed           Results from last 7 days   Lab Units 07/04/22  0612 07/03/22  1246 07/03/22  0505 07/02/22  0442 07/01/22  0559   SODIUM mmol/L 135* 134* 135* 134* 132*   POTASSIUM mmol/L 3 7 3 8 3 3* 3 6 4 8   CHLORIDE mmol/L 92* 91* 91* 91* 93*   CO2 mmol/L 37* 36* 36* 41* 36*   ANION GAP mmol/L 6 7 8 2* 3*   BUN mg/dL 23 26* 25 27* 21   CREATININE mg/dL 0 71 0 78 0 72 0 73 0 61   EGFR ml/min/1 73sq m 108 104 108 107 115   CALCIUM mg/dL 9 0 8 9 9 1 8 9 8 8   CALCIUM, IONIZED mmol/L  --   --   --  1 06*  --    MAGNESIUM mg/dL 2 3  --  2 4 2 2 2 5   PHOSPHORUS mg/dL 3 9  --  4 0 3 3 2 7         Results from last 7 days   Lab Units 07/04/22  2222 07/04/22  1722 07/04/22  1155 07/04/22  0544 07/04/22  0005 07/03/22  1723 07/03/22  1113 07/03/22  0521 07/02/22  2341 07/02/22  1730 07/02/22  1132   POC GLUCOSE mg/dl 156* 135 144* 143* 160* 160* 140 130 133 122 146*     Results from last 7 days   Lab Units 07/04/22  0612 07/03/22  1246 07/03/22  0505 07/02/22  0442 07/01/22  0559 06/30/22  0512 06/29/22  0537   GLUCOSE RANDOM mg/dL 122 141* 117 128 164* 133 131                 Results from last 7 days   Lab Units 07/04/22  1811   PTT seconds 131*             Results from last 7 days   Lab Units 07/03/22  1246   LACTIC ACID mmol/L 1 7         Results from last 7 days   Lab Units 06/30/22  0512   DIGOXIN LVL ng/mL 0 6*         Medications:   Scheduled Medications:      acetaminophen, 650 mg, Oral, Q6H  benzocaine, 2 spray, Mucosal, Once  bisacodyl, 10 mg, Rectal, Daily  budesonide-formoterol, 2 puff, Inhalation, BID  digoxin, 250 mcg, Intravenous, Daily  gabapentin, 300 mg, Oral, TID  insulin lispro, 1-5 Units, Subcutaneous, Q6H MARY  ipratropium, 0 5 mg, Nebulization, TID  levalbuterol, 1 25 mg, Nebulization, TID  levothyroxine, 25 mcg, Oral, Early Morning  lidocaine 1% 50 mL and sodium bicarb 8 4% 12 5 mL and epinephrine 1:1000 1 mL in lactated ringers 1000 mL, , Irrigation, Once  lidocaine, 1 application, Urethral, Once  metoclopramide, 10 mg, Intravenous, Q6H MARY  metoprolol, 5 mg, Intravenous, Q6H  pantoprazole, 20 mg, Oral, Early Morning  polyethylene glycol, 17 g, Oral, Daily  potassium chloride, 40 mEq, Oral, Once  QUEtiapine, 50 mg, Oral, HS  senna-docusate sodium, 1 tablet, Oral, BID  spironolactone, 50 mg, Per NG Tube, Daily  torsemide, 40 mg, Oral, BID      Continuous IV Infusions:      Adult TPN (CUSTOM BASE/CUSTOM ELECTROLYTE), , Intravenous, Continuous TPN  heparin (porcine), 3-30 Units/kg/hr (Order-Specific), Intravenous, Titrated      PRN Meds:    heparin (porcine), 10,000 Units, Intravenous, Q1H PRN  heparin (porcine), 5,000 Units, Intravenous, Q1H PRN  HYDROmorphone, 1 mg, Intravenous, Q6H PRN x 2 doses 7/4  methocarbamol, 500 mg, Oral, Q6H PRN  ondansetron, 4 mg, Intravenous, Q4H PRN  oxyCODONE, 10 mg, Oral, Q4H PRN x 1 dose 7/4  oxyCODONE, 5 mg, Oral, Q4H PRN        Discharge Plan:  TBD        Network Utilization Review Department  ATTENTION: Please call with any questions or concerns to [phone] and carefully listen to the prompts so that you are directed to the right person  All voicemails are confidential   [facility] all requests for admission clinical reviews, approved or denied determinations and any other requests to dedicated fax number below belonging to the campus where the patient is receiving treatment   List of dedicated fax numbers for the Facilities:  [address] DENIALS (Administrative/Medical Necessity) [phone]   [address] (Maternity/NICU/Pediatrics) [phone]   [address]  [address] Medical [place] [address] [id number] [place] [address] [address] [facility] [place] [address] [phone]

## 2022-07-05 NOTE — ANESTHESIA POSTPROCEDURE EVALUATION
Post-Op Assessment Note    CV Status:  Stable  Pain Score: 0    Pain management: adequate     Mental Status:  Alert   Hydration Status:  Stable   PONV Controlled:  Controlled   Airway Patency:  Patent      Post Op Vitals Reviewed: Yes      Staff: CRNA         No complications documented      BP   145/90   Temp 97 8   Pulse 119   Resp 18   SpO2 100

## 2022-07-05 NOTE — PROGRESS NOTES
Progress Note - General Surgery   Candice Null 47 y o  male MRN: [id number]  Unit/Bed#: [facility] 607-01 Encounter: [id number]    Assessment:  Patient is a 47 y o  male now status post attempted STSG, I&D, Washout, Vac on , POD5  Going for STSG today  Afebrile,VSS on 4LNC  Reports no complaints  Had loose BM yesterday, passing flatus  Abdomen soft, appropriately tender at midabdominal VAC site, BS present  VAC: 100 mL green tinged murky fluid  UOP: 1100 x1 unmeasured  WBC: 4 77, from 6 77  HB 6 from 9 1      Plan:  OR today  Keep NPO  Allow TPN to run out  Continue Cards recs  VAC changes  Encourage out of bed and ambulation  Prn Pain control  DVT px heparin drip  Transition to PO after OR  Subjective/Objective     Subjective:   No acute events overnight  Events as noted above  No HA, N/V, SOB, chest pain    Objective:    Blood pressure 113/66, pulse 95, temperature 98 3 °F (36 8 °C), resp  rate 16, height 5' 11" (1 803 m), weight (!) 223 kg (492 lb), SpO2 98 %  ,Body mass index is 68 62 kg/m²  Intake/Output Summary (Last 24 hours) at 2022 0536  Last data filed at 2022 0401  Gross per 24 hour   Intake 3774 49 ml   Output 1200 ml   Net 2574 49 ml       Invasive Devices  Report    Peripherally Inserted Central Catheter Line  Duration           PICC Line  Right Basilic 21 days                Physical Exam  Constitutional:       General: He is not in acute distress  Appearance: He is obese  He is not ill-appearing, toxic-appearing or diaphoretic  HENT:      Head: Normocephalic and atraumatic  Right Ear: External ear normal       Left Ear: External ear normal       Nose: Nose normal       Comments: NC inserted     Mouth/Throat:      Mouth: Mucous membranes are moist    Eyes:      Extraocular Movements: Extraocular movements intact  Conjunctiva/sclera: Conjunctivae normal    Cardiovascular:      Rate and Rhythm: Normal rate and regular rhythm  Pulses: Normal pulses  Heart sounds: No murmur heard  No friction rub  No gallop  Pulmonary:      Effort: Pulmonary effort is normal  No respiratory distress  Breath sounds: Normal breath sounds  Abdominal:      General: Bowel sounds are normal  There is no distension  Palpations: Abdomen is soft  Tenderness: There is abdominal tenderness (around VAC site)  There is no guarding or rebound  Musculoskeletal:      Cervical back: Normal range of motion  Skin:     General: Skin is warm and dry  Neurological:      Mental Status: He is alert     Psychiatric:         Mood and Affect: Mood normal          Behavior: Behavior normal              Results from last 7 days   Lab Units 07/05/22  0321 07/03/22  1246 07/03/22  0505   WBC Thousand/uL 4 77 6 77 6 86   HEMOGLOBIN g/dL 8 6* 9 1* 9 2*   HEMATOCRIT % 28 2* 30 1* 29 4*   PLATELETS Thousands/uL 235 256 246     Results from last 7 days   Lab Units 07/05/22  0321 07/04/22  0612 07/03/22  1246   POTASSIUM mmol/L 3 6 3 7 3 8   CHLORIDE mmol/L 90* 92* 91*   CO2 mmol/L 38* 37* 36*   BUN mg/dL 18 23 26*   CREATININE mg/dL 0 64 0 71 0 78   CALCIUM mg/dL 8 6 9 0 8 9     Results from last 7 days   Lab Units 07/05/22  0321 07/04/22  1811 07/04/22  0724   PTT seconds 73* 131* [address]  General Surgery

## 2022-07-05 NOTE — QUICK NOTE
Nurse-Patient-Provider rounds were completed with the patient's nurses today, Lupe Patel and Truong Kaba  We discussed the plan is to keep patient NPO in anticipation of operative intervention today  Patient to return to OR for wound evaluation and anticipated additional skin grafting with dressing change  Continue anticoagulation with heparin drip perioperatively  Complete current bag of TPN with plan to transition to oral diet postoperatively  Appreciate cardiology evaluation and ongoing recommendations  Plan to re-evaluate postoperatively  We reviewed all of the invasive devices/lines/telemetry orders   - Continue/maintain right-sided PICC line for ongoing TPN administration and IV access while requiring IV medications  DVT Prophylaxis:  - Anticoagulated with heparin drip  Pain Assessment / Plan:  - Continue current analgesic regimen  Mobility Assessment / Plan:  - Activity as tolerated with assistance  - Continue PT and OT evaluation treatment as indicated  Goals / Barriers for discharge:  - Not yet appropriate for discharge requiring further inpatient care and additional operative intervention   - Case management following; case and discharge needs discussed  All questions and concerns were addressed  I spent greater than 25 minutes reviewing the plan with the patient and the nurse, and coordinating care for the day      Earnest Montague PA-C  7/5/2022 08:48 AM

## 2022-07-05 NOTE — PLAN OF CARE
Problem: Prexisting or High Potential for Compromised Skin Integrity  Goal: Skin integrity is maintained or improved  Description: INTERVENTIONS:  - Identify patients at risk for skin breakdown  - Assess and monitor skin integrity  - Assess and monitor nutrition and hydration status  - Monitor labs   - Assess for incontinence   - Turn and reposition patient  - Assist with mobility/ambulation  - Relieve pressure over bony prominences  - Avoid friction and shearing  - Provide appropriate hygiene as needed including keeping skin clean and dry  - Evaluate need for skin moisturizer/barrier cream  - Collaborate with interdisciplinary team   - Patient/family teaching  - Consider wound care consult   Outcome: Progressing     Problem: Potential for Falls  Goal: Patient will remain free of falls  Description: INTERVENTIONS:  - Educate patient/family on patient safety including physical limitations  - Instruct patient to call for assistance with activity   - Consult OT/PT to assist with strengthening/mobility   - Keep Call bell within reach  - Keep bed low and locked with side rails adjusted as appropriate  - Keep care items and personal belongings within reach  - Initiate and maintain comfort rounds  - Make Fall Risk Sign visible to staff  - Offer Toileting every 1 Hours, in advance of need  - Initiate/Maintain alarm  - Obtain necessary fall risk management equipment  - Apply yellow socks and bracelet for high fall risk patients  - Consider moving patient to room near nurses station  Outcome: Progressing     Problem: MOBILITY - ADULT  Goal: Maintain or return to baseline ADL function  Description: INTERVENTIONS:  -  Assess patient's ability to carry out ADLs; assess patient's baseline for ADL function and identify physical deficits which impact ability to perform ADLs (bathing, care of mouth/teeth, toileting, grooming, dressing, etc )  - Assess/evaluate cause of self-care deficits   - Assess range of motion  - Assess patient's mobility; develop plan if impaired  - Assess patient's need for assistive devices and provide as appropriate  - Encourage maximum independence but intervene and supervise when necessary  - Involve family in performance of ADLs  - Assess for home care needs following discharge   - Consider OT consult to assist with ADL evaluation and planning for discharge  - Provide patient education as appropriate  Outcome: Progressing  Goal: Maintains/Returns to pre admission functional level  Description: INTERVENTIONS:  - Perform BMAT or MOVE assessment daily    - Set and communicate daily mobility goal to care team and patient/family/caregiver  - Collaborate with rehabilitation services on mobility goals if consulted  - Perform Range of Motion 3 times a day  - Reposition patient every 2 hours  - Dangle patient 3 times a day  - Stand patient 3 times a day  - Ambulate patient 3 times a day  - Out of bed to chair 3 times a day   - Out of bed for meals 3 times a day  - Out of bed for toileting  - Record patient progress and toleration of activity level   Outcome: Progressing     Problem: Nutrition/Hydration-ADULT  Goal: Nutrient/Hydration intake appropriate for improving, restoring or maintaining nutritional needs  Description: Monitor and assess patient's nutrition/hydration status for malnutrition  Collaborate with interdisciplinary team and initiate plan and interventions as ordered  Monitor patient's weight and dietary intake as ordered or per policy  Utilize nutrition screening tool and intervene as necessary  Determine patient's food preferences and provide high-protein, high-caloric foods as appropriate       INTERVENTIONS:  - Monitor oral intake, urinary output, labs, and treatment plans  - Assess nutrition and hydration status and recommend course of action  - Evaluate amount of meals eaten  - Assist patient with eating if necessary   - Allow adequate time for meals  - Recommend/ encourage appropriate diets, oral nutritional supplements, and vitamin/mineral supplements  - Order, calculate, and assess calorie counts as needed  - Recommend, monitor, and adjust tube feedings and TPN/PPN based on assessed needs  - Assess need for intravenous fluids  - Provide specific nutrition/hydration education as appropriate  - Include patient/family/caregiver in decisions related to nutrition  Outcome: Progressing     Problem: PAIN - ADULT  Goal: Verbalizes/displays adequate comfort level or baseline comfort level  Description: Interventions:  - Encourage patient to monitor pain and request assistance  - Assess pain using appropriate pain scale  - Administer analgesics based on type and severity of pain and evaluate response  - Implement non-pharmacological measures as appropriate and evaluate response  - Consider cultural and social influences on pain and pain management  - Notify physician/advanced practitioner if interventions unsuccessful or patient reports new pain  Outcome: Progressing     Problem: INFECTION - ADULT  Goal: Absence or prevention of progression during hospitalization  Description: INTERVENTIONS:  - Assess and monitor for signs and symptoms of infection  - Monitor lab/diagnostic results  - Monitor all insertion sites, i e  indwelling lines, tubes, and drains  - Monitor endotracheal if appropriate and nasal secretions for changes in amount and color  - [place] appropriate cooling/warming therapies per order  - Administer medications as ordered  - Instruct and encourage patient and family to use good hand hygiene technique  - Identify and instruct in appropriate isolation precautions for identified infection/condition  Outcome: Progressing     Problem: DISCHARGE PLANNING  Goal: Discharge to home or other facility with appropriate resources  Description: INTERVENTIONS:  - Identify barriers to discharge w/patient and caregiver  - Arrange for needed discharge resources and transportation as appropriate  - Identify discharge learning needs (meds, wound care, etc )  - Arrange for interpretive services to assist at discharge as needed  - Refer to Case Management Department for coordinating discharge planning if the patient needs post-hospital services based on physician/advanced practitioner order or complex needs related to functional status, cognitive ability, or social support system  Outcome: Progressin  Problem: Knowledge Deficit  Goal: Patient/family/caregiver demonstrates understanding of disease process, treatment plan, medications, and discharge instructions  Description: Complete learning assessment and assess knowledge base    Interventions:  - Provide teaching at level of understanding  - Provide teaching via preferred learning methods  Outcome: Progressing

## 2022-07-05 NOTE — ANESTHESIA PREPROCEDURE EVALUATION
Procedure:  SKIN GRAFT SPLIT THICKNESS (STSG)  TRUNK (N/A Abdomen)    Relevant Problems   CARDIO   (+) Atrial fibrillation with RVR (HCC)   (+) Atrial fibrillation with rapid ventricular response (HCC)   (+) Hypertension      ENDO   (+) Hypothyroidism      GI/HEPATIC   (+) SBO      NEURO/PSYCH   (+) Depression   (+) History of DVT (deep vein thrombosis)      PULMONARY   (+) Acute respiratory failure (HCC)   (+) COPD (chronic obstructive pulmonary disease) (HCC)   (+) Chronic respiratory failure with hypoxia (HCC)     Prior gr 1 view    Lab Results   Component Value Date    SODIUM 130 (L) 07/05/2022    K 3 6 07/05/2022    BUN 18 07/05/2022    CREATININE 0 64 07/05/2022    EGFR 113 07/05/2022    GLUCOSE 137 06/17/2022     Lab Results   Component Value Date    HGBA1C 5 6 06/07/2022       Lab Results   Component Value Date    HGB 8 6 (L) 07/05/2022    HGB 9 1 (L) 07/03/2022    HGB 9 2 (L) 07/03/2022     07/05/2022     07/03/2022     07/03/2022     Lab Results   Component Value Date    WBC 4 77 07/05/2022       Lab Results   Component Value Date    CREATININE 0 64 07/05/2022    CREATININE 0 71 07/04/2022    CREATININE 0 78 07/03/2022       Lab Results   Component Value Date    INR 1 22 (H) 06/15/2022    INR 1 13 06/04/2022    INR 1 01 03/13/2022     Lab Results   Component Value Date    PTT 53 (H) 07/05/2022    PTT 73 (H) 07/05/2022     (HH) 07/04/2022       No results found for: LACTATE      Type and Screen  A                    History    Afib, COPD, Diastolic HF, MRSA, DVT       Interpretation Summary         Left Ventricle: Left ventricle is not well visualized  Left ventricular cavity size is mildly dilated  Wall thickness is mildly increased  by visual estimation  Systolic function is mildly reduced  There is mild global hypokinesis    Mitral Valve: There is mild regurgitation    Tricuspid Valve: There is mild regurgitation      Pericardium: There is a small pericardial effusion  Physical Exam    Airway    Mallampati score: III  TM Distance: >3 FB       Dental       Cardiovascular      Pulmonary      Other Findings  PRIOR GR1 VIEW      Anesthesia Plan  ASA Score- 4     Anesthesia Type- general with ASA Monitors  Additional Monitors:   Airway Plan: ETT  Comment:  FRANCK Cobb , have personally seen and evaluated the patient prior to anesthetic care  I have reviewed the pre-anesthetic record, and other medical records if appropriate to the anesthetic care  If a CRNA is involved in the case, I have reviewed the CRNA assessment, if present, and agree  Risks/benefits and alternatives discussed with patient including possible PONV, sore throat, and possibility of rare anesthetic and surgical emergencies          Plan Factors-    Chart reviewed  EKG reviewed  Imaging results reviewed  Existing labs reviewed  Patient summary reviewed  Patient is not a current smoker  Patient instructed to abstain from smoking on day of procedure  There is medical exclusion for perioperative obstructive sleep apnea risk education  Induction- intravenous and rapid sequence induction  Postoperative Plan- Plan for postoperative opioid use  Planned trial extubation    Informed Consent- Anesthetic plan and risks discussed with patient  I personally reviewed this patient with the CRNA  Discussed and agreed on the Anesthesia Plan with the CRNA  Annia Luque

## 2022-07-05 NOTE — OP NOTE
OPERATIVE REPORT  PATIENT NAME: Candy Walls    :  1970  MRN: [id number]  Pt Location: BE OR ROOM 07    SURGERY DATE: 2022    Surgeon(s) and Role:     * Vivian Gallardo, DO - Primary     * Lord Perez, DO - Assisting    Preop Diagnosis:  Small bowel obstruction ([facility] [address] ) [K56 609]    Post-Op Diagnosis Codes:     * Small bowel obstruction ([address] ) [P02 [id number]]    Procedure(s) (LRB):  PREPERATION RECIPIENT STSG SITE, DEBRIDEMENT NONVIABLE SKIN EDGE; PREPERATION RECIPIENT SKIN GRAFT SITE; APPLICATION OF SKIN SUBSTITUTE TO DONOR SITE; APPLICATION WOUND VAC X2 GREATER THAN 50CM (N/A)    Specimen(s):  * No specimens in log *    Estimated Blood Loss:   Minimal    Drains:  * No LDAs found *    Anesthesia Type:   General    Operative Indications:  Small bowel obstruction ([facility] [address] ) [K56 609]      Operative Findings:  -Healthy granulation tissue adequate for skin grafting  -Split thickness skin graft in a 3:1 mesh fashion  -Donor site secured with 3 Oasis skin subsitute and secured with wound vac with 2 black sponge  -Recipient site dressed with 12 white and 2 black sponge    Complications:   None    Procedure and Technique:  Patient was met and identified in the preop holding area  He was identified by name and patient identification bracelet  He was placed in the supine position, and general endotracheal anesthesia was induced  The abdomen and left leg were prepped and draped in usual sterile fashion  A time-out was called and all parties were in agreement  We started by preparing the recipient site  The wound was copiously irrigated and the wound bed was agitated with a dry ray-subha to induced bleeding  Nonviable skin edges were then debrided for a total of 90x0 5x0 5 cm  The total dimensions of the recipient wound were 61x67w1 5cm  At this point we turned our attention to preparing the donor site  Mineral oil was then applied to the left leg  An electric dermatome at 0 14 was used to harvest four 4-inch skin sites  The skin grafts were meshed in a 1:3 ratio  The skin graft was then applied over the wound and the excessive skin trimmed  The skin was then stabilized to the wound using 3-0 chromic sutures in an interrupted  Three 20x7 skin substitute, Oasis, was applied to the donor site and secured with 3-0 chromic suture  The recipient site was then dressed with 12 white and 2 black sponge, and the donor site was dressed with 2 black sponges  These were y connected and connected to a wound vac at 125 mmHG  No leak was appreciated  The patient was extubated and brought to PACU in stable condition  Instrument, needle, and sponge counts were correct and confirmed by RF wanding      Dr Theo Browne was present for the entire procedure         Patient Disposition:  PACU       SIGNATURE: Chong Garcia, DO  DATE: 2022  TIME: 4:55 PM

## 2022-07-05 NOTE — PROGRESS NOTES
Cardiology Progress note  Unit/Bed#: [facility] 607-01 Encounter: [id number]        Priscila Mora 47 y o  male [id number]  Hospital Stay Days: 21    Assessment and Plan      Current Problem List   Principal Problem:    SBO  Active Problems:    Atrial fibrillation with RVR (HCC)    COPD (chronic obstructive pulmonary disease) (HCC)    Acute respiratory failure (HCC)    Assessment/Plan:    1  Atrial fibrillation with RVR  · Afib with moderate ventricular response on telemetry  · Currently on IV digoxin, lopressor, and heparin   Plan  · Continue digoxin  · Will measure digoxin level later this week  · OR today for SBO      Subjective   Patient is a 46 y/o male with a PMH of Afib, CHF (EF 55%), morbid obesity, and COPD who presented with vomitting and abdominal pain in early   Patient was found to have a SBO  Objective     Vitals: Temp (24hrs), Av 3 °F (36 8 °C), Min:97 8 °F (36 6 °C), Max:99 2 °F (37 3 °C)  Current: Temperature: 97 8 °F (36 6 °C)  Patient Vitals for the past 24 hrs:   BP Temp Pulse Resp SpO2   22 0748 -- -- -- -- 97 %   22 0710 103/64 97 8 °F (36 6 °C) 94 18 98 %   22 0215 113/66 98 3 °F (36 8 °C) 95 -- 98 %   22 2228 113/67 99 2 °F (37 3 °C) 92 -- 98 %   22 2042 -- -- -- -- 99 %   22 1853 104/53 98 4 °F (36 9 °C) 83 16 99 %   22 1521 119/66 98 °F (36 7 °C) 95 16 97 %   22 1432 -- -- -- -- 98 %   22 1155 122/66 98 1 °F (36 7 °C) -- 19 --    Body mass index is 68 62 kg/m²  Review of Systems   Cardiovascular: Negative for chest pain  Respiratory: Negative for shortness of breath  Physical Exam:  Physical Exam  Vitals reviewed  Constitutional:       Appearance: He is obese  Neurological:      Mental Status: He is alert and oriented to person, place, and time           Invasive Devices  Report    Peripherally Inserted Central Catheter Line  Duration           PICC Line 50//20 Right Basilic 21 days Labs:   Results from last 7 days   Lab Units 07/05/22  0321 07/03/22  1246 07/03/22  0505 07/02/22  0442 07/01/22  0559 06/30/22  0512 06/29/22  0537   WBC Thousand/uL 4 77 6 77 6 86 7 00 9 36 9 15 8 95   HEMOGLOBIN g/dL 8 6* 9 1* 9 2* 8 2* 8 6* 8 7* 8 7*   HEMATOCRIT % 28 2* 30 1* 29 4* 26 9* 27 9* 28 8* 28 2*   PLATELETS Thousands/uL 235 256 246 243 229 247 249   NEUTROS PCT %  --  67  --   --   --   --   --    MONOS PCT %  --  10  --   --   --   --   --    MONO PCT %  --   --  8  --   --   --  7      Results from last 7 days   Lab Units 07/05/22  0321 07/04/22  1811 07/04/22  0724 07/04/22  0612 07/03/22  1246 07/03/22  0505 07/02/22  0442 07/01/22  0559 07/01/22  0006 06/30/22  0512 06/29/22  2252 06/29/22  1455 06/29/22  0537 06/28/22  1954 06/28/22  1449   SODIUM mmol/L 130*  --   --  135* 134* 135* 134* 132*  --  132*  --   --  134*  --   --    POTASSIUM mmol/L 3 6  --   --  3 7 3 8 3 3* 3 6 4 8  --  3 8  --   --  4 0  --   --    CHLORIDE mmol/L 90*  --   --  92* 91* 91* 91* 93*  --  91*  --   --  91*  --   --    CO2 mmol/L 38*  --   --  37* 36* 36* 41* 36*  --  36*  --   --  39*  --   --    BUN mg/dL 18  --   --  23 26* 25 27* 21  --  20  --   --  17  --   --    CREATININE mg/dL 0 64  --   --  0 71 0 78 0 72 0 73 0 61  --  0 70  --   --  0 73  --   --    CALCIUM mg/dL 8 6  --   --  9 0 8 9 9 1 8 9 8 8  --  8 9  --   --  8 8  --   --    MAGNESIUM mg/dL 2 1  --   --  2 3  --  2 4 2 2 2 5  --  2 3  --   --   --   --   --    PHOSPHORUS mg/dL 3 2  --   --  3 9  --  4 0 3 3 2 7  --  3 3  --   --   --   --   --    PTT seconds 73* 131* 43*  --   --  75* 60* 81* 78*  --  133* 52* 93* 78* 71*   EGFR ml/min/1 73sq m 113  --   --  108 104 108 107 115  --  109  --   --  107  --   --      Results from last 7 days   Lab Units 07/05/22  0321 07/04/22  1811 07/04/22  0724 07/03/22  0505 07/02/22  0442 07/01/22  0559 07/01/22  0006 06/29/22  2252 06/29/22  1455 06/29/22  0537 06/28/22  1954 06/28/22  1449   PTT seconds 73* 131* 43* 75* 60* 81* 78* 133* 52* 93* 78* 71*     Results from last 7 days   Lab Units 07/03/22  1246   LACTIC ACID mmol/L 1 7         No results found for: PHART, VSX1CZE, PO2ART, GLR1GCJ, I3BXGGUW, BEART, SOURCE  No components found for: HIV1X2  No results found for: HAV, HEPAIGM, HEPBIGM, HEPBCAB, HBEAG, HEPCAB  No results found for: SPEP, UPEP   Lab Results   Component Value Date    HGBA1C 5 6 06/07/2022    HGBA1C 5 4 03/21/2022    HGBA1C 5 5 12/22/2021     No results found for: CHOL   Lab Results   Component Value Date    HDL 44 08/16/2016      Lab Results   Component Value Date    LDLCALC 80 08/16/2016      Lab Results   Component Value Date    TRIG 185 (H) 07/02/2022    TRIG 124 06/22/2022    TRIG 556 (H) 06/19/2022     No components found for: PROCAL      Micro:      Urinalysis:  No results found for: AMPHETUR, BDZUR, COCAINEUR, OPIATEUR, PCPUR, THCUR, ETOH, ACTMNPHEN, SALICYLATE       Invalid input(s): URIBILINOGEN        Intake and Outputs:  I/O       07/03 0701 07/04 0700 07/04 0701 07/05 0700 07/05 0701  07/06 0700    P  O  240 920     I V  (mL/kg) 772 3 (3 5) 755 8 (3 4)     NG/GT       TPN 1316 4 2303 1     Total Intake(mL/kg) 2328 7 (10 4) 3978 9 (17 8)     Urine (mL/kg/hr) 900 (0 2) 1100 (0 2)     Emesis/NG output 600      Drains 350      Stool 0 0     Total Output 1850 1100     Net +478 7 +2878 9            Unmeasured Urine Occurrence 1 x 1 x     Unmeasured Stool Occurrence 2 x 2 x         Nutrition:  Adult 3-in-1 TPN (custom base / custom electrolytes)  Diet NPO; Sips with meds  Radiology Results:   XR chest portable   Final Result by Aziza Christopher MD (07/03 9351)         1  Technically limited study  2   Mild vascular and  interstitial prominence suggesting mild volume overload  3   Nasogastric tube as noted  Workstation performed: [id number]         XR abdomen 1 view kub   Final Result by Aziza Christopher MD (51/24 2581)         1    Contrast opacifies nondilated colon and rectum  No high-grade obstruction noted  2   Endogastric tube terminates in the stomach  Workstation performed: [id number]         XR abdomen 1 view kub   Final Result by Authur Sever, MD (06/30 2868)      Contrast has passed into the proximal descending colon  Mildly dilated loops of air-filled distal colon  A distal obstruction is not excluded, and follow-up radiograph could be considered at this time to confirm progression of contrast       The study was marked in [facility] for immediate notification  Workstation performed: [id number]         XR abdomen 1 vw portable   Final Result by Tori Rondon MD (06/29 1323)      Enteric tube in the stomach  Workstation performed: [id number]         XR abdomen 1 view kub   Final Result by Batsheva Hinton DO (06/27 3561)      Enteric tube tip looped upon itself terminating in the region of the distal esophagus  Repositioning advised  Nonspecific bowel gas pattern  The study was marked in [facility] for immediate notification  Workstation performed: [id number]         XR chest portable   Final Result by Caroline Hollingsworth DO (06/24 1026)   Diminished lung volumes  Pulmonary edema exaggerated by vascular crowding  Workstation performed: [id number]         FL barium swallow video w speech   Final Result by LINSEY CH (06/23 1137)      XR chest portable   Final Result by Christina Seaman MD (06/22 1131)      Interval removal of the ET tube and right-sided central line with persistent NG tube and right-sided PICC line      Unchanged bilateral particularly left-sided airspace disease identified                  Workstation performed: [id number]         XR abdomen 1 view kub   Final Result by Christina Seaman MD (06/22 1133)      NG tube in correct location        Persistent bilateral particularly left-sided pleuroparenchymal changes      Persistent dilated small bowel loops in this context of small bowel obstruction         Workstation performed: [id number]         XR chest portable ICU   Final Result by Karin Hassan MD (06/19 0008)      1  Image quality limited but suspicious for pneumonia in the left lung base unchanged since the most recent prior study  Superimposed CHF would also be a consideration  2   Lines and tubes in satisfactory position  Workstation performed: [id number]         XR chest portable ICU   Final Result by Dany Veliz MD (06/17 2011)      Questioned left basilar infiltrate  Annia Rebel Heart shadow is enlarged and there is pulmonary vascular congestion concerning for CHF  Workstation performed: [id number]         XR chest portable ICU   Final Result by Andrea Alford MD (06/17 1043)      Pulmonary vascular congestion without a definitive consolidation or effusion  No pneumothorax                 Workstation performed: [id number]           Scheduled Medications:  acetaminophen, 650 mg, Q6H  benzocaine, 2 spray, Once  bisacodyl, 10 mg, Daily  budesonide-formoterol, 2 puff, BID  digoxin, 250 mcg, Daily  gabapentin, 300 mg, TID  insulin lispro, 1-5 Units, Q6H MARY  ipratropium, 0 5 mg, TID  levalbuterol, 1 25 mg, TID  levothyroxine, 25 mcg, Early Morning  lidocaine 1% 50 mL and sodium bicarb 8 4% 12 5 mL and epinephrine 1:1000 1 mL in lactated ringers 1000 mL, , Once  lidocaine, 1 application, Once  metoclopramide, 10 mg, Q6H MARY  metoprolol, 5 mg, Q6H  pantoprazole, 20 mg, Early Morning  polyethylene glycol, 17 g, Daily  QUEtiapine, 50 mg, HS  senna-docusate sodium, 1 tablet, BID  spironolactone, 50 mg, Daily  torsemide, 40 mg, BID      PRN MEDS:  heparin (porcine), 10,000 Units, Q1H PRN  heparin (porcine), 5,000 Units, Q1H PRN  HYDROmorphone, 1 mg, Q6H PRN  methocarbamol, 500 mg, Q6H PRN  ondansetron, 4 mg, Q4H PRN  oxyCODONE, 10 mg, Q4H PRN  oxyCODONE, 5 mg, Q4H PRN      Last 24 Hour Meds: :   Medication Administration - last 24 hours from 07/04/2022 0817 to 07/05/2022 2664       Date/Time Order Dose Route Action Action by     07/05/2022 0744 levalbuterol (XOPENEX) inhalation solution 1 25 mg 1 25 mg Nebulization Given Remona Kale, RT     07/04/2022 2042 levalbuterol (Javi Ip) inhalation solution 1 25 mg 1 25 mg Nebulization Given Bernhart Hochleitner, RT     07/04/2022 1432 levalbuterol (XOPENEX) inhalation solution 1 25 mg 1 25 mg Nebulization Given Mishel Dura, RT     07/05/2022 0744 ipratropium (ATROVENT) 0 02 % inhalation solution 0 5 mg 0 5 mg Nebulization Given Remona Kale, RT     07/04/2022 2042 ipratropium (ATROVENT) 0 02 % inhalation solution 0 5 mg 0 5 mg Nebulization Given Bernhart Hochleitner, RT     07/04/2022 1432 ipratropium (ATROVENT) 0 02 % inhalation solution 0 5 mg 0 5 mg Nebulization Given Mishel Dura, RT     07/05/2022 0645 insulin lispro (HumaLOG) 100 units/mL subcutaneous injection 1-5 Units 1 Units Subcutaneous Not Given Aakash Trujillo RN     07/04/2022 2327 insulin lispro (HumaLOG) 100 units/mL subcutaneous injection 1-5 Units 1 Units Subcutaneous Not Given Aakash Trujillo RN     07/04/2022 1723 insulin lispro (HumaLOG) 100 units/mL subcutaneous injection 1-5 Units 1 Units Subcutaneous Not Given Zoila Liao RN     07/04/2022 1258 insulin lispro (HumaLOG) 100 units/mL subcutaneous injection 1-5 Units 1 Units Subcutaneous Not Given Zoila Liao RN     07/05/2022 0323 heparin (porcine) 25,000 units in 0 45% NaCl 250 mL infusion (premix) 20 Units/kg/hr Intravenous [address] Mahsa Trujillo RN     07/04/2022 2204 heparin (porcine) 25,000 units in 0 45% NaCl 250 mL infusion (premix) 20 Units/kg/hr Intravenous [address] Aakash Trujillo RN     07/04/2022 2123 heparin (porcine) 25,000 units in 0 45% NaCl 250 mL infusion (premix) 20 Units/kg/hr Intravenous Restarted Aakash Trujillo RN     07/04/2022 1953 heparin (porcine) 25,000 units in 0 45% NaCl 250 mL infusion (premix) 0 Units/kg/hr Intravenous Hold Jabier Beck, RN     07/04/2022 1305 heparin (porcine) 25,000 units in 0 45% NaCl 250 mL infusion (premix) 23 Units/kg/hr Intravenous [address],  [address] [place], [place]     07/04/2022 1307 heparin (porcine) injection 5,000 Units 5,000 Units Intravenous Given Gloria Jon, RN     07/04/2022 1728 senna-docusate sodium (SENOKOT S) 8 6-50 mg per tablet 1 tablet 1 tablet Oral Not Given Gloria Million, RN     07/04/2022 0050 senna-docusate sodium (SENOKOT S) 8 6-50 mg per tablet 1 tablet 1 tablet Oral Given Raffy Daniel, RN     07/04/2022 1272 spironolactone (ALDACTONE) tablet 50 mg 50 mg Per NG Tube Not Given Raffy Daniel, RN     07/04/2022 2208 QUEtiapine (SEROquel) tablet 50 mg 50 mg Oral Given Jabier Beck, RN     07/05/2022 9976 acetaminophen (TYLENOL) oral suspension 650 mg 650 mg Oral Not Given Jabier Beck, RN     07/04/2022 2208 acetaminophen (TYLENOL) oral suspension 650 mg 650 mg Oral Given Jabier Beck, RN     07/04/2022 1722 acetaminophen (TYLENOL) oral suspension 650 mg 650 mg Oral Given Gloria Million, RN     07/04/2022 1312 acetaminophen (TYLENOL) oral suspension 650 mg 650 mg Oral Given Gloria Million, RN     07/04/2022 0838 polyethylene glycol (MIRALAX) packet 17 g 17 g Oral Given Raffy Daniel, RN     07/04/2022 2209 gabapentin (NEURONTIN) oral solution 300 mg 300 mg Oral Given Jabier Beck, RN     07/04/2022 1723 gabapentin (NEURONTIN) oral solution 300 mg 300 mg Oral Given Gloria Million, RN     07/04/2022 1315 gabapentin (NEURONTIN) oral solution 300 mg 300 mg Oral Given Gloria Million, RN     07/04/2022 2209 budesonide-formoterol (SYMBICORT) 160-4 5 mcg/act inhaler 2 puff 2 puff Inhalation Refused Jabier Beck, RN     07/04/2022 0838 budesonide-formoterol (SYMBICORT) 160-4 5 mcg/act inhaler 2 puff 2 puff Inhalation Given Raffy Sanchez RN     07/04/2022 2207 torsemide (DEMADEX) tablet 40 mg 40 mg Oral Given Jabier Beck RN     07/04/2022 5841 torsemide (DEMADEX) tablet 40 mg 40 mg Oral Not Given Chalino Cornelius, RN     07/05/2022 6709 levothyroxine tablet 25 mcg 25 mcg Oral Not Given Carrington Ortiz, RN     07/04/2022 5598 bisacodyl (DULCOLAX) rectal suppository 10 mg 10 mg Rectal Refused Chalino Cornelius, RN     07/04/2022 2212 HYDROmorphone (DILAUDID) injection 1 mg 1 mg Intravenous Given Carrington Ortiz, RN     07/04/2022 1004 HYDROmorphone (DILAUDID) injection 1 mg 1 mg Intravenous Given Vicente Blank, RN     07/05/2022 7806 metoclopramide (REGLAN) injection 10 mg 10 mg Intravenous Given Carrington Ortiz, RN     07/04/2022 2326 metoclopramide (REGLAN) injection 10 mg 10 mg Intravenous Given Carrington Ortiz, RN     07/04/2022 1724 metoclopramide (REGLAN) injection 10 mg 10 mg Intravenous Given Ilan Roberts, RN     07/04/2022 1314 metoclopramide (REGLAN) injection 10 mg 10 mg Intravenous Given Ilan Roberts, RN     07/04/2022 2154 Adult 3-in-1 TPN (custom base / custom electrolytes)   Intravenous Stopped Carrington Ortiz, RN     07/04/2022 8460 digoxin (LANOXIN) injection 250 mcg 250 mcg Intravenous Given Chalino Cornelius, RN     07/05/2022 8153 metoprolol (LOPRESSOR) injection 5 mg 5 mg Intravenous Given Carrington Ortiz RN     07/04/2022 2212 metoprolol (LOPRESSOR) injection 5 mg 5 mg Intravenous Given Carrington Ortiz, [place]EDIS     07/04/2022 1724 metoprolol (LOPRESSOR) injection 5 mg 5 mg Intravenous Given Ilan Roberts, RN     07/04/2022 1310 metoprolol (LOPRESSOR) injection 5 mg 5 mg Intravenous Given Ilan Roberts, RN     07/04/2022 2205 Adult 3-in-1 TPN (custom base / custom electrolytes)   Intravenous Guero Huertas, FREDIS     07/05/2022 4048 pantoprazole (PROTONIX) EC tablet 20 mg 20 mg Oral Not Given Carrington Ortiz, RN     07/04/2022 1316 pantoprazole (PROTONIX) EC tablet 20 mg 20 mg Oral Given Ilan Roberts RN          PLEASE NOTE:  This encounter was completed utilizing the [employer]Fluency Direct Speech Voice Recognition Software  Grammatical errors, random word insertions, pronoun errors and incomplete sentences are occasional consequences of the system due to software limitations, ambient noise and hardware issues  These may be missed by proof reading prior to affixing electronic signature  Any questions or concerns about the content, text or information contained within the body of this dictation should be directly addressed to the physician for clarification  Please do not hesitate to call me directly if you have any any questions or concerns          FRANCK Shore Asa   PGY-1 Internal Medicine

## 2022-07-06 LAB
ANION GAP SERPL CALCULATED.3IONS-SCNC: 6 MMOL/L (ref 4–13)
APTT PPP: 67 SECONDS (ref 23–37)
BUN SERPL-MCNC: 19 MG/DL (ref 5–25)
CALCIUM SERPL-MCNC: 8.7 MG/DL (ref 8.3–10.1)
CHLORIDE SERPL-SCNC: 91 MMOL/L (ref 100–108)
CO2 SERPL-SCNC: 33 MMOL/L (ref 21–32)
CREAT SERPL-MCNC: 0.59 MG/DL (ref 0.6–1.3)
DIGOXIN SERPL-MCNC: 1 NG/ML (ref 0.8–2)
ERYTHROCYTE [DISTWIDTH] IN BLOOD BY AUTOMATED COUNT: 15.8 % (ref 11.6–15.1)
GFR SERPL CREATININE-BSD FRML MDRD: 117 ML/MIN/1.73SQ M
GLUCOSE SERPL-MCNC: 109 MG/DL (ref 65–140)
GLUCOSE SERPL-MCNC: 118 MG/DL (ref 65–140)
GLUCOSE SERPL-MCNC: 127 MG/DL (ref 65–140)
GLUCOSE SERPL-MCNC: 148 MG/DL (ref 65–140)
GLUCOSE SERPL-MCNC: 153 MG/DL (ref 65–140)
HCT VFR BLD AUTO: 26.4 % (ref 36.5–49.3)
HGB BLD-MCNC: 7.9 G/DL (ref 12–17)
MCH RBC QN AUTO: 28.6 PG (ref 26.8–34.3)
MCHC RBC AUTO-ENTMCNC: 29.9 G/DL (ref 31.4–37.4)
MCV RBC AUTO: 96 FL (ref 82–98)
PLATELET # BLD AUTO: 207 THOUSANDS/UL (ref 149–390)
PMV BLD AUTO: 10.6 FL (ref 8.9–12.7)
POTASSIUM SERPL-SCNC: 4 MMOL/L (ref 3.5–5.3)
RBC # BLD AUTO: 2.76 MILLION/UL (ref 3.88–5.62)
SODIUM SERPL-SCNC: 130 MMOL/L (ref 136–145)
WBC # BLD AUTO: 4.83 THOUSAND/UL (ref 4.31–10.16)

## 2022-07-06 PROCEDURE — 94640 AIRWAY INHALATION TREATMENT: CPT

## 2022-07-06 PROCEDURE — 94760 N-INVAS EAR/PLS OXIMETRY 1: CPT

## 2022-07-06 PROCEDURE — 82948 REAGENT STRIP/BLOOD GLUCOSE: CPT

## 2022-07-06 PROCEDURE — 80048 BASIC METABOLIC PNL TOTAL CA: CPT | Performed by: SURGERY

## 2022-07-06 PROCEDURE — 85027 COMPLETE CBC AUTOMATED: CPT | Performed by: SURGERY

## 2022-07-06 PROCEDURE — 85730 THROMBOPLASTIN TIME PARTIAL: CPT | Performed by: SURGERY

## 2022-07-06 PROCEDURE — 99232 SBSQ HOSP IP/OBS MODERATE 35: CPT | Performed by: INTERNAL MEDICINE

## 2022-07-06 PROCEDURE — 92526 ORAL FUNCTION THERAPY: CPT

## 2022-07-06 PROCEDURE — 99024 POSTOP FOLLOW-UP VISIT: CPT | Performed by: SURGERY

## 2022-07-06 PROCEDURE — 80162 ASSAY OF DIGOXIN TOTAL: CPT | Performed by: SURGERY

## 2022-07-06 RX ORDER — DIGOXIN 250 MCG
250 TABLET ORAL DAILY
Status: DISCONTINUED | OUTPATIENT
Start: 2022-07-06 | End: 2022-07-19 | Stop reason: HOSPADM

## 2022-07-06 RX ADMIN — HYDROMORPHONE HYDROCHLORIDE 1 MG: 1 INJECTION, SOLUTION INTRAMUSCULAR; INTRAVENOUS; SUBCUTANEOUS at 19:59

## 2022-07-06 RX ADMIN — METHOCARBAMOL TABLETS 750 MG: 750 TABLET, COATED ORAL at 16:44

## 2022-07-06 RX ADMIN — METHOCARBAMOL TABLETS 750 MG: 750 TABLET, COATED ORAL at 06:25

## 2022-07-06 RX ADMIN — INSULIN LISPRO 1 UNITS: 100 INJECTION, SOLUTION INTRAVENOUS; SUBCUTANEOUS at 23:37

## 2022-07-06 RX ADMIN — ACETAMINOPHEN 975 MG: 325 TABLET, FILM COATED ORAL at 14:12

## 2022-07-06 RX ADMIN — METOROPROLOL TARTRATE 5 MG: 5 INJECTION, SOLUTION INTRAVENOUS at 10:08

## 2022-07-06 RX ADMIN — DIGOXIN 250 MCG: 0.25 INJECTION INTRAMUSCULAR; INTRAVENOUS at 08:40

## 2022-07-06 RX ADMIN — METOPROLOL TARTRATE 25 MG: 25 TABLET, FILM COATED ORAL at 16:44

## 2022-07-06 RX ADMIN — SENNOSIDES AND DOCUSATE SODIUM 1 TABLET: 8.6; 5 TABLET ORAL at 08:39

## 2022-07-06 RX ADMIN — METOCLOPRAMIDE HYDROCHLORIDE 10 MG: 5 INJECTION INTRAMUSCULAR; INTRAVENOUS at 11:26

## 2022-07-06 RX ADMIN — HYDROMORPHONE HYDROCHLORIDE 1 MG: 1 INJECTION, SOLUTION INTRAMUSCULAR; INTRAVENOUS; SUBCUTANEOUS at 01:10

## 2022-07-06 RX ADMIN — HEPARIN SODIUM 22 UNITS/KG/HR: 10000 INJECTION, SOLUTION INTRAVENOUS at 14:13

## 2022-07-06 RX ADMIN — METOCLOPRAMIDE HYDROCHLORIDE 10 MG: 5 INJECTION INTRAMUSCULAR; INTRAVENOUS at 06:27

## 2022-07-06 RX ADMIN — HEPARIN SODIUM 22 UNITS/KG/HR: 10000 INJECTION, SOLUTION INTRAVENOUS at 20:04

## 2022-07-06 RX ADMIN — METOROPROLOL TARTRATE 5 MG: 5 INJECTION, SOLUTION INTRAVENOUS at 06:26

## 2022-07-06 RX ADMIN — TORSEMIDE 40 MG: 20 TABLET ORAL at 08:39

## 2022-07-06 RX ADMIN — QUETIAPINE FUMARATE 50 MG: 25 TABLET ORAL at 21:50

## 2022-07-06 RX ADMIN — SPIRONOLACTONE 50 MG: 50 TABLET ORAL at 08:39

## 2022-07-06 RX ADMIN — POLYETHYLENE GLYCOL 3350 17 G: 17 POWDER, FOR SOLUTION ORAL at 08:40

## 2022-07-06 RX ADMIN — PANTOPRAZOLE SODIUM 20 MG: 20 TABLET, DELAYED RELEASE ORAL at 06:26

## 2022-07-06 RX ADMIN — HEPARIN SODIUM 22 UNITS/KG/HR: 10000 INJECTION, SOLUTION INTRAVENOUS at 02:31

## 2022-07-06 RX ADMIN — GABAPENTIN 300 MG: 250 SOLUTION ORAL at 08:40

## 2022-07-06 RX ADMIN — IPRATROPIUM BROMIDE 0.5 MG: 0.5 SOLUTION RESPIRATORY (INHALATION) at 13:58

## 2022-07-06 RX ADMIN — METOCLOPRAMIDE HYDROCHLORIDE 10 MG: 5 INJECTION INTRAMUSCULAR; INTRAVENOUS at 23:37

## 2022-07-06 RX ADMIN — BISACODYL 10 MG: 10 SUPPOSITORY RECTAL at 08:39

## 2022-07-06 RX ADMIN — IPRATROPIUM BROMIDE 0.5 MG: 0.5 SOLUTION RESPIRATORY (INHALATION) at 07:52

## 2022-07-06 RX ADMIN — LEVALBUTEROL HYDROCHLORIDE 1.25 MG: 1.25 SOLUTION, CONCENTRATE RESPIRATORY (INHALATION) at 07:53

## 2022-07-06 RX ADMIN — LEVALBUTEROL HYDROCHLORIDE 1.25 MG: 1.25 SOLUTION, CONCENTRATE RESPIRATORY (INHALATION) at 19:49

## 2022-07-06 RX ADMIN — OXYCODONE HYDROCHLORIDE 10 MG: 5 SOLUTION ORAL at 23:37

## 2022-07-06 RX ADMIN — OXYCODONE HYDROCHLORIDE 10 MG: 5 SOLUTION ORAL at 16:45

## 2022-07-06 RX ADMIN — GABAPENTIN 300 MG: 250 SOLUTION ORAL at 16:44

## 2022-07-06 RX ADMIN — METHOCARBAMOL TABLETS 750 MG: 750 TABLET, COATED ORAL at 11:26

## 2022-07-06 RX ADMIN — METOCLOPRAMIDE HYDROCHLORIDE 10 MG: 5 INJECTION INTRAMUSCULAR; INTRAVENOUS at 16:44

## 2022-07-06 RX ADMIN — METOPROLOL TARTRATE 25 MG: 25 TABLET, FILM COATED ORAL at 23:36

## 2022-07-06 RX ADMIN — OXYCODONE HYDROCHLORIDE 5 MG: 5 SOLUTION ORAL at 06:26

## 2022-07-06 RX ADMIN — LEVOTHYROXINE SODIUM 25 MCG: 25 TABLET ORAL at 06:26

## 2022-07-06 RX ADMIN — LEVALBUTEROL HYDROCHLORIDE 1.25 MG: 1.25 SOLUTION, CONCENTRATE RESPIRATORY (INHALATION) at 13:58

## 2022-07-06 RX ADMIN — METHOCARBAMOL TABLETS 750 MG: 750 TABLET, COATED ORAL at 23:37

## 2022-07-06 RX ADMIN — HYDROMORPHONE HYDROCHLORIDE 1 MG: 1 INJECTION, SOLUTION INTRAMUSCULAR; INTRAVENOUS; SUBCUTANEOUS at 08:40

## 2022-07-06 RX ADMIN — BUDESONIDE AND FORMOTEROL FUMARATE DIHYDRATE 2 PUFF: 160; 4.5 AEROSOL RESPIRATORY (INHALATION) at 08:40

## 2022-07-06 RX ADMIN — GABAPENTIN 300 MG: 250 SOLUTION ORAL at 21:51

## 2022-07-06 RX ADMIN — ACETAMINOPHEN 975 MG: 325 TABLET, FILM COATED ORAL at 06:25

## 2022-07-06 RX ADMIN — IPRATROPIUM BROMIDE 0.5 MG: 0.5 SOLUTION RESPIRATORY (INHALATION) at 19:49

## 2022-07-06 NOTE — PROGRESS NOTES
Cardiology Progress note  Unit/Bed#: [facility] 607-01 Encounter: [id number]        Harmony Chinchilla 47 y o  male [id number]  Hospital Stay Days: 22    Assessment and Plan      Current Problem List   Principal Problem:    SBO  Active Problems:    Atrial fibrillation with RVR (HCC)    COPD (chronic obstructive pulmonary disease) (HCC)    Acute respiratory failure (HCC)    Assessment/Plan:    1  Atrial fibrillation with RVR  · Afib with moderate ventricular response on telemetry  · Currently on IV digoxin, lopressor, and heparin   · Most recent digoxin level was 1  Plan  ? Continue digoxin  ? Continue telemetry  ? Post op day 1 from debridement, wound vac, graft of SBO      Subjective    Patient is a 46 y/o male with a PMH of Afib, CHF (EF 55%), morbid obesity, and COPD who presented with vomitting and abdominal pain in early   Patient was found to have a SBO     Objective     Vitals: Temp (24hrs), Av 1 °F (36 7 °C), Min:97 6 °F (36 4 °C), Max:98 4 °F (36 9 °C)  Current: Temperature: 98 4 °F (36 9 °C)  Patient Vitals for the past 24 hrs:   BP Temp Temp src Pulse Resp SpO2   22 0753 -- -- -- -- -- 98 %   22 0440 114/56 -- -- 104 -- 97 %   22 0218 110/60 98 4 °F (36 9 °C) -- 97 16 96 %   22 0130 110/57 -- -- 100 -- 96 %   22 0030 110/57 -- -- 80 -- 97 %   22 2230 123/66 -- -- 95 -- 97 %   22 2222 133/65 98 3 °F (36 8 °C) -- 89 17 98 %   22 2130 116/59 -- -- 86 -- 98 %   22 109/53 -- -- 97 -- 98 %   22 192 104/55 -- -- 85 -- 98 %   22 1828 110/55 -- -- 98 -- 94 %   22 1823 101/52 98 3 °F (36 8 °C) Axillary -- 19 --   22 1800 (!) 100/45 -- -- 104 20 92 %   22 1757 (!) 100/45 97 6 °F (36 4 °C) -- 98 20 96 %   22 1745 (!) 106/33 -- -- 102 20 100 %   22 1730 106/75 -- -- (!) 106 20 97 %   22 1715 105/83 -- -- (!) 120 20 90 %   22 1700 145/90 -- -- (!) 124 20 99 %   22 1652 145/90 97 6 °F (36 4 °C) -- (!) 130 20 98 %   07/05/22 1142 103/51 98 2 °F (36 8 °C) -- 101 16 96 %    Body mass index is 68 62 kg/m²  Physical Exam:  Physical Exam  Vitals reviewed  Constitutional:       Appearance: He is obese  Neurological:      Mental Status: He is alert and oriented to person, place, and time           Invasive Devices  Report    Peripherally Inserted Central Catheter Line  Duration           PICC Line 22/78/85 Right Basilic 22 days                    Labs:   Results from last 7 days   Lab Units 07/06/22  0250 07/05/22  0321 07/03/22  1246 07/03/22  0505 07/02/22  0442 07/01/22  0559 06/30/22  0512   WBC Thousand/uL 4 83 4 77 6 77 6 86 7 00 9 36 9 15   HEMOGLOBIN g/dL 7 9* 8 6* 9 1* 9 2* 8 2* 8 6* 8 7*   HEMATOCRIT % 26 4* 28 2* 30 1* 29 4* 26 9* 27 9* 28 8*   PLATELETS Thousands/uL 207 235 256 246 243 229 247   NEUTROS PCT %  --   --  67  --   --   --   --    MONOS PCT %  --   --  10  --   --   --   --    MONO PCT %  --  4  --  8  --   --   --       Results from last 7 days   Lab Units 07/06/22  0249 07/06/22  0245 07/05/22  1842 07/05/22  1016 07/05/22  0321 07/04/22  1811 07/04/22  0724 07/04/22  0612 07/03/22  1246 07/03/22  0505 07/02/22  0442 07/01/22  0559 07/01/22  0006 06/30/22  0512 06/29/22  2252 06/29/22  1455   SODIUM mmol/L  --  130*  --   --  130*  --   --  135* 134* 135* 134* 132*  --  132*  --   --    POTASSIUM mmol/L  --  4 0  --   --  3 6  --   --  3 7 3 8 3 3* 3 6 4 8  --  3 8  --   --    CHLORIDE mmol/L  --  91*  --   --  90*  --   --  92* 91* 91* 91* 93*  --  91*  --   --    CO2 mmol/L  --  33*  --   --  38*  --   --  37* 36* 36* 41* 36*  --  36*  --   --    BUN mg/dL  --  19  --   --  18  --   --  23 26* 25 27* 21  --  20  --   --    CREATININE mg/dL  --  0 59*  --   --  0 64  --   --  0 71 0 78 0 72 0 73 0 61  --  0 70  --   --    CALCIUM mg/dL  --  8 7  --   --  8 6  --   --  9 0 8 9 9 1 8 9 8 8  --  8 9  --   --    MAGNESIUM mg/dL  --   --   --   --  2 1  --   --  2 3  --  2 4 2 2 2 5 --  2 3  --   --    PHOSPHORUS mg/dL  --   --   --   --  3 2  --   --  3 9  --  4 0 3 3 2 7  --  3 3  --   --    PTT seconds 67*  --  73* 53* 73* 131* 43*  --   --  75* 60* 81* 78*  --  133* 52*   EGFR ml/min/1 73sq m  --  117  --   --  113  --   --  108 104 108 107 115  --  109  --   --      Results from last 7 days   Lab Units 07/06/22  0249 07/05/22  1842 07/05/22  1016 07/05/22  0321 07/04/22  1811 07/04/22  0724 07/03/22  0505 07/02/22  0442 07/01/22  0559 07/01/22  0006 06/29/22  2252 06/29/22  1455   PTT seconds 67* 73* 53* 73* 131* 43* 75* 60* 81* 78* 133* 52*     Results from last 7 days   Lab Units 07/03/22  1246   LACTIC ACID mmol/L 1 7         No results found for: PHART, TDK5MIF, PO2ART, ECU4NQX, C8YPSQOC, BEART, SOURCE  No components found for: HIV1X2  No results found for: HAV, HEPAIGM, HEPBIGM, HEPBCAB, HBEAG, HEPCAB  No results found for: SPEP, UPEP   Lab Results   Component Value Date    HGBA1C 5 6 06/07/2022    HGBA1C 5 4 03/21/2022    HGBA1C 5 5 12/22/2021     No results found for: CHOL   Lab Results   Component Value Date    HDL 44 08/16/2016      Lab Results   Component Value Date    LDLCALC 80 08/16/2016      Lab Results   Component Value Date    TRIG 185 (H) 07/02/2022    TRIG 124 06/22/2022    TRIG 556 (H) 06/19/2022     No components found for: PROCAL      Micro:      Urinalysis:  No results found for: AMPHETUR, BDZUR, COCAINEUR, OPIATEUR, PCPUR, THCUR, ETOH, ACTMNPHEN, SALICYLATE       Invalid input(s): URIBILINOGEN        Intake and Outputs:  I/O       07/04 0701 07/05 0700 07/05 0701 07/06 0700 07/06 0701 07/07 0700    P  O  920      I V  (mL/kg) 755 8 (3 4) 589 4 (2 6)     IV Piggyback  250     TPN 2303 1 1039 6     Total Intake(mL/kg) 3978 9 (17 8) 1879 (8 4)     Urine (mL/kg/hr) 1100 (0 2) 1750 (0 3)     Emesis/NG output       Drains  50     Stool 0 0     Total Output 1100 1800     Net +2878 9 +79            Unmeasured Urine Occurrence 1 x      Unmeasured Stool Occurrence 2 x 1 x Nutrition:  Diet Nino/CHO Controlled; Consistent Carbohydrate Diet Level 2 (5 carb servings/75 grams CHO/meal)  Radiology Results:   XR chest portable   Final Result by Cheyenne Becerra MD (07/03 1516)         1  Technically limited study  2   Mild vascular and  interstitial prominence suggesting mild volume overload  3   Nasogastric tube as noted  Workstation performed: [id number]         XR abdomen 1 view kub   Final Result by Cheyenne Becerra MD (47/02 3563)         1  Contrast opacifies nondilated colon and rectum  No high-grade obstruction noted  2   Endogastric tube terminates in the stomach  Workstation performed: [id number]         XR abdomen 1 view kub   Final Result by Brissa Alberts MD (06/30 2166)      Contrast has passed into the proximal descending colon  Mildly dilated loops of air-filled distal colon  A distal obstruction is not excluded, and follow-up radiograph could be considered at this time to confirm progression of contrast       The study was marked in [facility] for immediate notification  Workstation performed: [id number]         XR abdomen 1 vw portable   Final Result by Alessandra Cox MD (06/29 1323)      Enteric tube in the stomach  Workstation performed: [id number]         XR abdomen 1 view kub   Final Result by Dary Burr DO (06/27 5077)      Enteric tube tip looped upon itself terminating in the region of the distal esophagus  Repositioning advised  Nonspecific bowel gas pattern  The study was marked in [facility] for immediate notification  Workstation performed: [id number]         XR chest portable   Final Result by Vinny Charles DO (06/24 1026)   Diminished lung volumes  Pulmonary edema exaggerated by vascular crowding                    Workstation performed: [id number]         FL barium swallow video w speech   Final Result by LINSEY CH (06/23 1137)      XR chest portable   Final Result by Nikki Herzog MD (06/22 1131)      Interval removal of the ET tube and right-sided central line with persistent NG tube and right-sided PICC line      Unchanged bilateral particularly left-sided airspace disease identified                  Workstation performed: [id number]         XR abdomen 1 view kub   Final Result by Nikki Herzog MD (06/22 1133)      NG tube in correct location  Persistent bilateral particularly left-sided pleuroparenchymal changes      Persistent dilated small bowel loops in this context of small bowel obstruction         Workstation performed: [id number]         XR chest portable ICU   Final Result by Brenda Gonzalez MD (06/19 0008)      1  Image quality limited but suspicious for pneumonia in the left lung base unchanged since the most recent prior study  Superimposed CHF would also be a consideration  2   Lines and tubes in satisfactory position  Workstation performed: [id number]         XR chest portable ICU   Final Result by Onel Wren MD (06/17 2011)      Questioned left basilar infiltrate  [facility] [place] [facility] shadow is enlarged and there is pulmonary vascular congestion concerning for CHF  Workstation performed: [id number]         XR chest portable ICU   Final Result by Sheila Barrios MD (06/17 1043)      Pulmonary vascular congestion without a definitive consolidation or effusion  No pneumothorax                 Workstation performed: [id number]           Scheduled Medications:  acetaminophen, 975 mg, Q8H MARY  benzocaine, 2 spray, Once  bisacodyl, 10 mg, Daily  budesonide-formoterol, 2 puff, BID  digoxin, 250 mcg, Daily  gabapentin, 300 mg, TID  insulin lispro, 1-5 Units, Q6H MARY  ipratropium, 0 5 mg, TID  levalbuterol, 1 25 mg, TID  levothyroxine, 25 mcg, Early Morning  lidocaine, 1 application, Once  methocarbamol, 750 mg, Q6H MARY  metoclopramide, 10 mg, Q6H MARY  metoprolol, 5 mg, Q6H  pantoprazole, 20 mg, Early Morning  polyethylene glycol, 17 g, Daily  QUEtiapine, 50 mg, HS  senna-docusate sodium, 1 tablet, BID  spironolactone, 50 mg, Daily  torsemide, 40 mg, BID      PRN MEDS:  heparin (porcine), 10,000 Units, Q1H PRN  heparin (porcine), 5,000 Units, Q1H PRN  HYDROmorphone, 1 mg, Q6H PRN  ondansetron, 4 mg, Q4H PRN  oxyCODONE, 10 mg, Q4H PRN  oxyCODONE, 5 mg, Q4H PRN      Last 24 Hour Meds: :   Medication Administration - last 24 hours from 07/05/2022 0808 to 07/06/2022 8054       Date/Time Order Dose Route Action Action by     07/06/2022 0753 levalbuterol (XOPENEX) inhalation solution 1 25 mg 1 25 mg Nebulization Given Isma Sanchez, RT     07/05/2022 2030 levalbuterol (XOPENEX) inhalation solution 1 25 mg 1 25 mg Nebulization Given Carissa Hardy, RT     07/05/2022 1654 levalbuterol (XOPENEX) inhalation solution 1 25 mg   Nebulization [facility]     07/05/2022 1400 levalbuterol (XOPENEX) inhalation solution 1 25 mg   Nebulization Dose Auto Held Automatic Transfer Provider     07/05/2022 1328 levalbuterol (XOPENEX) inhalation solution 1 25 mg   Nebulization MAR Hold Automatic Transfer Provider     07/06/2022 0752 ipratropium (ATROVENT) 0 02 % inhalation solution 0 5 mg 0 5 mg Nebulization Given Isma Sanchez, RT     07/05/2022 2030 ipratropium (ATROVENT) 0 02 % inhalation solution 0 5 mg 0 5 mg Nebulization Given Carissa Hardy, RT     07/05/2022 1654 ipratropium (ATROVENT) 0 02 % inhalation solution 0 5 mg   Nebulization MAR Unhold [facility] FlorinaBucyrus, DO     07/05/2022 1400 ipratropium (ATROVENT) 0 02 % inhalation solution 0 5 mg   Nebulization Dose Auto Held Automatic Transfer Provider     07/05/2022 1328 ipratropium (ATROVENT) 0 02 % inhalation solution 0 5 mg   Nebulization MAR Hold Automatic Transfer Provider     07/06/2022 0626 insulin lispro (HumaLOG) 100 units/mL subcutaneous injection 1-5 Units 1 Units Subcutaneous Not Given Jasmin Kelly RN     07/06/2022 0024 insulin lispro (HumaLOG) 100 units/mL subcutaneous injection 1-5 Units 1 Units Subcutaneous Not Given Rogelio Jeff RN     07/05/2022 1734 insulin lispro (HumaLOG) 100 units/mL subcutaneous injection 1-5 Units 1 Units Subcutaneous Given Alexander Vazquez RN     07/05/2022 1654 insulin lispro (HumaLOG) 100 units/mL subcutaneous injection 1-5 Units   Subcutaneous MAR Unhold Marshal Strong, DO     07/05/2022 1328 insulin lispro (HumaLOG) 100 units/mL subcutaneous injection 1-5 Units   Subcutaneous MAR Hold Automatic Transfer Provider     07/05/2022 1155 insulin lispro (HumaLOG) 100 units/mL subcutaneous injection 1-5 Units 1 Units Subcutaneous Not Given Godwin Stands     07/06/2022 0231 heparin (porcine) 25,000 units in 0 45% NaCl 250 mL infusion (premix) 22 Units/kg/hr Intravenous [address] Rogelio Jeff RN     07/05/2022 1710 heparin (porcine) 25,000 units in 0 45% NaCl 250 mL infusion (premix) 22 Units/kg/hr Intravenous [address] Alexander Vazquez RN     07/05/2022 1406 heparin (porcine) 25,000 units in 0 45% NaCl 250 mL infusion (premix) 22 Units/kg/hr Intravenous Continue from Pre-op Lianna Cooley CRNA     07/05/2022 1216 heparin (porcine) 25,000 units in 0 45% NaCl 250 mL infusion (premix) 22 Units/kg/hr Intravenous New Bag Godwin Stands     07/05/2022 1654 heparin (porcine) injection 10,000 Units   Intravenous MAR Unhold Marshal Strong, DO     07/05/2022 1328 heparin (porcine) injection 10,000 Units   Intravenous MAR Hold Automatic Transfer Provider     07/05/2022 1654 heparin (porcine) injection 5,000 Units   Intravenous MAR Unhold Marshal Strong, DO     07/05/2022 1328 heparin (porcine) injection 5,000 Units   Intravenous MAR Hold Automatic Transfer Provider     07/05/2022 1156 heparin (porcine) injection 5,000 Units 5,000 Units Intravenous Given Godwin Stands     07/06/2022 0626 oxyCODONE (ROXICODONE) oral solution 5 mg 5 mg Oral Given Rogelio Jeff RN     07/05/2022 7219 oxyCODONE (ROXICODONE) oral solution 5 mg   Oral MAR Unhold Marshal Strong, DO     07/05/2022 1328 oxyCODONE (ROXICODONE) oral solution 5 mg   Oral MAR Hold Automatic Transfer Provider     07/05/2022 1166 oxyCODONE (ROXICODONE) oral solution 5 mg 5 mg Oral Given Caio Austin, RN     07/05/2022 1824 oxyCODONE (ROXICODONE) oral solution 10 mg 10 mg Oral Given Caio Austin, FREDIS     07/05/2022 1654 oxyCODONE (ROXICODONE) oral solution 10 mg   Oral MAR Unhold Marshal Strong, DO     07/05/2022 1328 oxyCODONE (ROXICODONE) oral solution 10 mg   Oral MAR Hold Automatic Transfer Provider     07/05/2022 1135 oxyCODONE (ROXICODONE) oral solution 10 mg 0 mg Oral Return to [facility]     07/05/2022 1824 senna-docusate sodium (SENOKOT S) 8 6-50 mg per tablet 1 tablet 1 tablet Oral Given Caio Austin RN     07/05/2022 1654 senna-docusate sodium (SENOKOT S) 8 6-50 mg per tablet 1 tablet   Oral MAR Unhold Marshal Strong, DO     07/05/2022 1328 senna-docusate sodium (SENOKOT S) 8 6-50 mg per tablet 1 tablet   Oral MAR Hold Automatic Transfer Provider     07/05/2022 0910 senna-docusate sodium (SENOKOT S) 8 6-50 mg per tablet 1 tablet 1 tablet Oral Not Given Caio Austin RN     07/05/2022 1654 spironolactone (ALDACTONE) tablet 50 mg   Per NG Tube MAR Unhold Marshal Strong DO     07/05/2022 1328 spironolactone (ALDACTONE) tablet 50 mg   Per NG Tube MAR Hold Automatic Transfer Provider     07/05/2022 0916 spironolactone (ALDACTONE) tablet 50 mg 50 mg Per NG Tube Not Given Caio Austin RN     07/05/2022 2127 QUEtiapine (SEROquel) tablet 50 mg 50 mg Oral Given Jamaal Forbes RN     07/05/2022 1654 QUEtiapine (SEROquel) tablet 50 mg   Oral MAR Unhold Marshal P Ligia,      07/05/2022 1328 QUEtiapine (SEROquel) tablet 50 mg   Oral MAR Hold Automatic Transfer Provider     07/05/2022 1654 acetaminophen (TYLENOL) oral suspension 650 mg   Oral MAR Unhold Marshal P Ligia, DO     07/05/2022 1328 acetaminophen (TYLENOL) oral suspension 650 mg   Oral MAR Hold Automatic Transfer Provider     07/05/2022 1136 acetaminophen (TYLENOL) oral suspension 650 mg 650 mg Oral Not Given Ty Krabbe     07/05/2022 1654 polyethylene glycol (MIRALAX) packet 17 g   Oral MAR Unhold Marshal Strong, DO     07/05/2022 1328 polyethylene glycol (MIRALAX) packet 17 g   Oral MAR Hold Automatic Transfer Provider     07/05/2022 0909 polyethylene glycol (MIRALAX) packet 17 g 17 g Oral Not Given Mook Hurd RN     07/05/2022 2127 gabapentin (NEURONTIN) oral solution 300 mg 300 mg Oral Given Ana Dudley RN     07/05/2022 1654 gabapentin (NEURONTIN) oral solution 300 mg   Oral MAR Unhold Marshal Strong, DO     07/05/2022 1600 gabapentin (NEURONTIN) oral solution 300 mg   Oral Dose Auto Held Automatic Transfer Provider     07/05/2022 1328 gabapentin (NEURONTIN) oral solution 300 mg   Oral MAR Hold Automatic Transfer Provider     07/05/2022 0923 gabapentin (NEURONTIN) oral solution 300 mg 300 mg Oral Given Mook Hurd RN     07/05/2022 1654 methocarbamol (ROBAXIN) tablet 500 mg   Oral MAR Unhold Marshal Strong, DO     07/05/2022 1328 methocarbamol (ROBAXIN) tablet 500 mg   Oral MAR Hold Automatic Transfer Provider     07/05/2022 2121 budesonide-formoterol (SYMBICORT) 160-4 5 mcg/act inhaler 2 puff 2 puff Inhalation Refused Ana Dudley RN     07/05/2022 1654 budesonide-formoterol (SYMBICORT) 160-4 5 mcg/act inhaler 2 puff   Inhalation MAR Unhold Marshal Strong,      07/05/2022 1328 budesonide-formoterol (SYMBICORT) 160-4 5 mcg/act inhaler 2 puff   Inhalation MAR Hold Automatic Transfer Provider     07/05/2022 0923 budesonide-formoterol (SYMBICORT) 160-4 5 mcg/act inhaler 2 puff 2 puff Inhalation Given Mook Hurd RN     07/05/2022 2122 torsemide (DEMADEX) tablet 40 mg 40 mg Oral Not Given Ana Dudley RN     07/05/2022 8135 torsemide (DEMADEX) tablet 40 mg   Oral MAR Unhold Christopher Cota, DO 07/05/2022 1328 torsemide (DEMADEX) tablet 40 mg   Oral MAR Hold Automatic Transfer Provider     07/05/2022 0917 torsemide (DEMADEX) tablet 40 mg 40 mg Oral Not Given Bibiana Raman, FREDIS     07/06/2022 1122 levothyroxine tablet 25 mcg 25 mcg Oral Given Darwin Rousseau RN     07/05/2022 1654 levothyroxine tablet 25 mcg   Oral MAR Unhold Marshal Strong, DO     07/05/2022 1328 levothyroxine tablet 25 mcg   Oral MAR Hold Automatic Transfer Provider     07/05/2022 1654 bisacodyl (DULCOLAX) rectal suppository 10 mg   Rectal MAR Unhold Marshal Strong, DO     07/05/2022 1328 bisacodyl (DULCOLAX) rectal suppository 10 mg   Rectal MAR Hold Automatic Transfer Provider     07/05/2022 0907 bisacodyl (DULCOLAX) rectal suppository 10 mg   Rectal Canceled Entry Bibiana Raman, FREDIS     07/05/2022 1654 ondansetron (ZOFRAN) injection 4 mg   Intravenous MAR Unhold Marshal Strong, DO     07/05/2022 1328 ondansetron (ZOFRAN) injection 4 mg   Intravenous MAR Hold Automatic Transfer Provider     07/06/2022 0110 HYDROmorphone (DILAUDID) injection 1 mg 1 mg Intravenous Given Darwin Rousseau RN     07/05/2022 1654 HYDROmorphone (DILAUDID) injection 1 mg   Intravenous MAR Unhold Marshal Strong DO     07/05/2022 1328 HYDROmorphone (DILAUDID) injection 1 mg   Intravenous MAR Hold Automatic Transfer Provider     07/05/2022 1654 lidocaine (URO-JET) 2 % urethral/mucosal gel 1 application   Urethral MAR Unhold Marshal Strong DO     07/05/2022 1328 lidocaine (URO-JET) 2 % urethral/mucosal gel 1 application   Urethral MAR Hold Automatic Transfer Provider     07/05/2022 1654 benzocaine (HURRICAINE) 20 % mucosal spray 2 spray   Mucosal MAR Unhold Marshal Strong, DO     07/05/2022 1328 benzocaine (HURRICAINE) 20 % mucosal spray 2 spray   Mucosal MAR Hold Automatic Transfer Provider     07/06/2022 0671 metoclopramide (REGLAN) injection 10 mg 10 mg Intravenous Given Darwin Rousseau RN     07/05/2022 3668 metoclopramide (REGLAN) injection 10 mg 10 mg Intravenous Given Raenette Must, RN     07/05/2022 1739 metoclopramide (REGLAN) injection 10 mg 10 mg Intravenous Given Deyanne Meo, RN     07/05/2022 1654 metoclopramide (REGLAN) injection 10 mg   Intravenous MAR Unhold Marshal P Allsshyanneok, DO     07/05/2022 1328 metoclopramide (REGLAN) injection 10 mg   Intravenous MAR Hold Automatic Transfer Provider     07/05/2022 1155 metoclopramide (REGLAN) injection 10 mg 10 mg Intravenous Given Les Corrente     07/05/2022 1654 digoxin (LANOXIN) injection 250 mcg   Intravenous MAR Unhold Marshal P AllsMcMillan, DO     07/05/2022 1328 digoxin (LANOXIN) injection 250 mcg   Intravenous MAR Hold Automatic Transfer Provider     07/05/2022 0923 digoxin (LANOXIN) injection 250 mcg 250 mcg Intravenous Given Cathay Crigler, RN     07/06/2022 0626 metoprolol (LOPRESSOR) injection 5 mg 5 mg Intravenous Given Raenette Must, RN     07/05/2022 2324 metoprolol (LOPRESSOR) injection 5 mg 5 mg Intravenous Given Raenette Must, RN     07/05/2022 1736 metoprolol (LOPRESSOR) injection 5 mg 5 mg Intravenous Given Deóscarne Meo, RN     07/05/2022 1654 metoprolol (LOPRESSOR) injection 5 mg   Intravenous MAR Unhold Marshal P Renettaok, DO     07/05/2022 1328 metoprolol (LOPRESSOR) injection 5 mg   Intravenous MAR Hold Automatic Transfer Provider     07/05/2022 1155 metoprolol (LOPRESSOR) injection 5 mg 5 mg Intravenous Given Les Corrente     07/05/2022 1929 Adult 3-in-1 TPN (custom base / custom electrolytes)   Intravenous Stopped Marcoe Must, RN     07/05/2022 1406 Adult 3-in-1 TPN (custom base / custom electrolytes)   Intravenous Continue from Pre-op Kelly Peters, CRNA     07/06/2022 0626 pantoprazole (PROTONIX) EC tablet 20 mg 20 mg Oral Given Raenette Must, RN     07/05/2022 1654 pantoprazole (PROTONIX) EC tablet 20 mg   Oral MAR Unhold Marshal P [place], DO     07/05/2022 1328 pantoprazole (PROTONIX) EC tablet 20 mg   Oral MAR Hold Automatic Transfer Provider     07/05/2022 1154 potassium chloride (K-DUR,KLOR-CON) CR tablet 40 mEq 40 mEq Oral Given Kvng Goff     07/05/2022 1743 fentaNYL (SUBLIMAZE) injection 25 mcg 25 mcg Intravenous Given [place], RN     07/05/2022 1732 fentaNYL (SUBLIMAZE) injection 25 mcg 25 mcg Intravenous Given [place], RN     07/05/2022 1725 fentaNYL (SUBLIMAZE) injection 25 mcg 25 mcg Intravenous Given [place], RN     07/05/2022 1717 fentaNYL (SUBLIMAZE) injection 25 mcg 25 mcg Intravenous Given [place], RN     07/06/2022 9305 acetaminophen (TYLENOL) tablet 975 mg 975 mg Oral Given Neldon Ormike, RN     07/05/2022 2127 acetaminophen (TYLENOL) tablet 975 mg 975 mg Oral Given Neldon Orts, RN     07/05/2022 1700 acetaminophen (TYLENOL) tablet 975 mg   Oral Canceled Entry Cristopher Thomas, FREDIS     07/06/2022 1256 methocarbamol (ROBAXIN) tablet 750 mg 750 mg Oral Given Neldon Ormike, RN     07/06/2022 0025 methocarbamol (ROBAXIN) tablet 750 mg 750 mg Oral Refused Neldon Ormike, RN     07/05/2022 1824 methocarbamol (ROBAXIN) tablet 750 mg 750 mg Oral Given Cristopher Thomas RN          PLEASE NOTE:  This encounter was completed utilizing the [employer] Direct Speech Voice Recognition Software  Grammatical errors, random word insertions, pronoun errors and incomplete sentences are occasional consequences of the system due to software limitations, ambient noise and hardware issues  These may be missed by proof reading prior to affixing electronic signature  Any questions or concerns about the content, text or information contained within the body of this dictation should be directly addressed to the physician for clarification  Please do not hesitate to call me directly if you have any any questions or concerns          FRANCK Lowe   PGY-1 Internal Medicine

## 2022-07-06 NOTE — PLAN OF CARE
Problem: Prexisting or High Potential for Compromised Skin Integrity  Goal: Skin integrity is maintained or improved  Description: INTERVENTIONS:  - Identify patients at risk for skin breakdown  - Assess and monitor skin integrity  - Assess and monitor nutrition and hydration status  - Monitor labs   - Assess for incontinence   - Turn and reposition patient  - Assist with mobility/ambulation  - Relieve pressure over bony prominences  - Avoid friction and shearing  - Provide appropriate hygiene as needed including keeping skin clean and dry  - Evaluate need for skin moisturizer/barrier cream  - Collaborate with interdisciplinary team   - Patient/family teaching  - Consider wound care consult   Outcome: Progressing     Problem: Potential for Falls  Goal: Patient will remain free of falls  Description: INTERVENTIONS:  - Educate patient/family on patient safety including physical limitations  - Instruct patient to call for assistance with activity   - Consult OT/PT to assist with strengthening/mobility   - Keep Call bell within reach  - Keep bed low and locked with side rails adjusted as appropriate  - Keep care items and personal belongings within reach  - Initiate and maintain comfort rounds  - Make Fall Risk Sign visible to staff  - Offer Toileting every 1 Hours, in advance of need  - Initiate/Maintain alarm  - Obtain necessary fall risk management equipment  - Apply yellow socks and bracelet for high fall risk patients  - Consider moving patient to room near nurses station  Outcome: Progressing     Problem: MOBILITY - ADULT  Goal: Maintain or return to baseline ADL function  Description: INTERVENTIONS:  -  Assess patient's ability to carry out ADLs; assess patient's baseline for ADL function and identify physical deficits which impact ability to perform ADLs (bathing, care of mouth/teeth, toileting, grooming, dressing, etc )  - Assess/evaluate cause of self-care deficits   - Assess range of motion  - Assess patient's mobility; develop plan if impaired  - Assess patient's need for assistive devices and provide as appropriate  - Encourage maximum independence but intervene and supervise when necessary  - Involve family in performance of ADLs  - Assess for home care needs following discharge   - Consider OT consult to assist with ADL evaluation and planning for discharge  - Provide patient education as appropriate  Outcome: Progressing  Goal: Maintains/Returns to pre admission functional level  Description: INTERVENTIONS:  - Perform BMAT or MOVE assessment daily    - Set and communicate daily mobility goal to care team and patient/family/caregiver  - Collaborate with rehabilitation services on mobility goals if consulted  - Perform Range of Motion 3 times a day  - Reposition patient every 2 hours  - Dangle patient 3 times a day  - Stand patient 3 times a day  - Ambulate patient 3 times a day  - Out of bed to chair 3 times a day   - Out of bed for meals 3 times a day  - Out of bed for toileting  - Record patient progress and toleration of activity level   Outcome: Progressing     Problem: Nutrition/Hydration-ADULT  Goal: Nutrient/Hydration intake appropriate for improving, restoring or maintaining nutritional needs  Description: Monitor and assess patient's nutrition/hydration status for malnutrition  Collaborate with interdisciplinary team and initiate plan and interventions as ordered  Monitor patient's weight and dietary intake as ordered or per policy  Utilize nutrition screening tool and intervene as necessary  Determine patient's food preferences and provide high-protein, high-caloric foods as appropriate       INTERVENTIONS:  - Monitor oral intake, urinary output, labs, and treatment plans  - Assess nutrition and hydration status and recommend course of action  - Evaluate amount of meals eaten  - Assist patient with eating if necessary   - Allow adequate time for meals  - Recommend/ encourage appropriate diets, oral nutritional supplements, and vitamin/mineral supplements  - Order, calculate, and assess calorie counts as needed  - Recommend, monitor, and adjust tube feedings and TPN/PPN based on assessed needs  - Assess need for intravenous fluids  - Provide specific nutrition/hydration education as appropriate  - Include patient/family/caregiver in decisions related to nutrition  Outcome: Progressing     Problem: PAIN - ADULT  Goal: Verbalizes/displays adequate comfort level or baseline comfort level  Description: Interventions:  - Encourage patient to monitor pain and request assistance  - Assess pain using appropriate pain scale  - Administer analgesics based on type and severity of pain and evaluate response  - Implement non-pharmacological measures as appropriate and evaluate response  - Consider cultural and social influences on pain and pain management  - Notify physician/advanced practitioner if interventions unsuccessful or patient reports new pain  Outcome: Progressing     Problem: Knowledge Deficit  Goal: Patient/family/caregiver demonstrates understanding of disease process, treatment plan, medications, and discharge instructions  Description: Complete learning assessment and assess knowledge base    Interventions:  - Provide teaching at level of understanding  - Provide teaching via preferred learning methods  Outcome: Progressing     Problem: CARDIOVASCULAR - ADULT  Goal: Maintains optimal cardiac output and hemodynamic stability  Description: INTERVENTIONS:  - Monitor I/O, vital signs and rhythm  - Monitor for S/S and trends of decreased cardiac output  - Administer and titrate ordered vasoactive medications to optimize hemodynamic stability  - Assess quality of pulses, skin color and temperature  - Assess for signs of decreased coronary artery perfusion  - Instruct patient to report change in severity of symptoms  Outcome: Progressing  Goal: Absence of cardiac dysrhythmias or at baseline rhythm  Description: INTERVENTIONS:  - Continuous cardiac monitoring, vital signs, obtain 12 lead EKG if ordered  - Administer antiarrhythmic and heart rate control medications as ordered  - Monitor electrolytes and administer replacement therapy as ordered  Outcome: Progressing     Problem: RESPIRATORY - ADULT  Goal: Achieves optimal ventilation and oxygenation  Description: INTERVENTIONS:  - Assess for changes in respiratory status  - Assess for changes in mentation and behavior  - Position to facilitate oxygenation and minimize respiratory effort  - Oxygen administered by appropriate delivery if ordered  - Initiate smoking cessation education as indicated  - Encourage broncho-pulmonary hygiene including cough, deep breathe, Incentive Spirometry  - Assess the need for suctioning and aspirate as needed  - Assess and instruct to report SOB or any respiratory difficulty  - Respiratory Therapy support as indicated  Outcome: Progressing     Problem: GASTROINTESTINAL - ADULT  Goal: Minimal or absence of nausea and/or vomiting  Description: INTERVENTIONS:  - Administer IV fluids if ordered to ensure adequate hydration  - Maintain NPO status until nausea and vomiting are resolved  - Nasogastric tube if ordered  - Administer ordered antiemetic medications as needed  - Provide nonpharmacologic comfort measures as appropriate  - Advance diet as tolerated, if ordered  - Consider nutrition services referral to assist patient with adequate nutrition and appropriate food choices  Outcome: Progressing  Goal: Maintains or returns to baseline bowel function  Description: INTERVENTIONS:  - Assess bowel function  - Encourage oral fluids to ensure adequate hydration  - Administer IV fluids if ordered to ensure adequate hydration  - Administer ordered medications as needed  - Encourage mobilization and activity  - Consider nutritional services referral to assist patient with adequate nutrition and appropriate food choices  Outcome: Progressing  Goal: Maintains adequate nutritional intake  Description: INTERVENTIONS:  - Monitor percentage of each meal consumed  - Identify factors contributing to decreased intake, treat as appropriate  - Assist with meals as needed  - Monitor I&O, weight, and lab values if indicated  - Obtain nutrition services referral as needed  Outcome: Progressing  Goal: Establish and maintain optimal ostomy function  Description: INTERVENTIONS:  - Assess bowel function  - Encourage oral fluids to ensure adequate hydration  - Administer IV fluids if ordered to ensure adequate hydration   - Administer ordered medications as needed  - Encourage mobilization and activity  - Nutrition services referral to assist patient with appropte food choices  - Assess stoma site  - Consider wound care consult   Outcome: Progressing  Goal: Oral mucous membranes remain intact  Description: INTERVENTIONS  - Assess oral mucosa and hygiene practices  - Implement preventative oral hygiene regimen  - Implement oral medicated treatments as ordered  - Initiate Nutrition services referral as needed  Outcome: Progressing

## 2022-07-06 NOTE — SPEECH THERAPY NOTE
Speech Language/Pathology    Speech/Language Pathology Progress Note    Patient Name: Sergio Villafana  WURXE'Q Date: 7/6/2022     Problem List  Principal Problem:    SBO  Active Problems:    Atrial fibrillation with RVR (HCC)    COPD (chronic obstructive pulmonary disease) (HCC)    Acute respiratory failure (HCC)       Past Medical History  Past Medical History:   Diagnosis Date    [facility] )     Anemia     Anxiety     [facility]     Cardiac disease     Cellulitis     [facility] )     Depression     Diabetes mellitus ([facility] )     DVT (deep venous thrombosis) (HCC)     GERD (gastroesophageal reflux disease)     HBP (high blood pressure)     Heart failure (HCC)     Hx of blood clots     Hypertension     Hypokalemia     Hypothyroid     Obesity     Psychiatric disorder         Past Surgical History  Past Surgical History:   Procedure Laterality Date    ABDOMINAL HERNIA REPAIR  05/2019    CHOLECYSTECTOMY      Lap    GASTRECTOMY SLEEVE LAPAROSCOPIC  08/2018    INCISION AND DRAINAGE OF WOUND Bilateral 12/01/2021    Procedure: INCISION AND DRAINAGE (I&D) HEAD/FACE;  Surgeon: Mariah Viramontes MD;  Location:  MAIN OR;  Service: General    IR PICC PLACEMENT DOUBLE LUMEN  06/13/2022    IVC FILTER INSERTION  2018    KNEE SURGERY Right     open wound, injury     LAPAROTOMY N/A 6/14/2022    Procedure: LAPAROTOMY EXPLORATORY, EXTENSIVE LYSIS OF ADHESIONS, APPLICATION OF ABTHERA WOUND VAC;  Surgeon: Harsha Santana MD;  [facility];  Service: General    LAPAROTOMY N/A 6/15/2022    Procedure: LAPAROTOMY EXPLORATORY WITH REPAIR OF SEROSAL INJURY;  Surgeon: Warden Margaret MD;  Location:  MAIN OR;  Service: General    LEG SURGERY Right 2009    SPLIT THICKNESS SKIN GRAFT N/A 6/30/2022    Procedure: incision and drainage, wash out vac change;  Surgeon: Shakila Coyne DO;  [facility];  Service: General    SPLIT THICKNESS SKIN GRAFT N/A 7/5/2022    Procedure: PREPERATION RECIPIENT STSG SITE, DEBRIDEMENT NONVIABLE SKIN EDGE; PREPERATION RECIPIENT SKIN GRAFT SITE; APPLICATION OF SKIN SUBSTITUTE TO DONOR SITE; APPLICATION WOUND VAC X2 GREATER THAN 50CM; Surgeon: Meg Medina DO;  [facility];  Service: General    US GUIDED VASCULAR ACCESS  08/06/2018    VAC DRESSING APPLICATION N/A 8/03/3914    Procedure: CHANGE DRESSING/VAC ABDOMEN;  Surgeon: Ruthy Moseley MD;  [facility];  Service: General    VAC DRESSING APPLICATION N/A 0/19/7862    Procedure: ABDOMINAL WASHOUT, REPAIR OF SEROSAL TEARS,BRIDING VICRYL MESH, PARTIAL CLOSURE, APPLICATION OF WOUND VAC;  Surgeon: Dora Lozano DO;  [facility];  Service: General    VAC DRESSING APPLICATION N/A 3/13/5955    Procedure: CHANGE DRESSING/VAC ABDOMEN;  Surgeon: Dora Lozano DO;  [facility];  Service: General         Subjective:  Pt alert and awake in bed  Pt seen w/ trial of regular texture and thin     "I had turkey and cheese for lunch"    Current Diet:  Regular w/ thin, ST recommended Dysphagia 2 w/ thin    Objective:  Pt seen for dx dysphagia tx f/u  Pt reported being full from lunch when he ate "turkey and cheese" but was willing to participate in trials of regular texture material (granola bar) and straw sips thin liquids  Pt w/ no noted difficulties w/ mastication, manipulation or transfer  No oral residue noted  Pt w/ suspected prompt swallow initiation  No overt s/s aspiration noted w/ consistencies trialed today  Pt reports he remembers the "ground up food" and the globus sensation he would feel w/ regular textured trials during his VBS study, noted pharyngeal retention present when pt c/o sensation  Pt w/ no further c/o globus sensation w/ trials assessed today or w/ his lunch  Pt received education on strategies to optimize swallow safety and s/s aspiration   Pt educated to monitor and notify his medical team should s/s aspiration occur  Pt verbalized understanding and agreeable  Pt stated he has no questions or concerns regarding to his swallow  Assessment:  Pt w/ suspected WFL oropharyngeal swallow skill  No further IP ST needs       Plan/Recommendations:  Continue current diet of Regular w/ thin  Frequent and thorough oral care (3-4x daily)  No further IP ST needs, please reconsult if medically indicated

## 2022-07-06 NOTE — PROGRESS NOTES
Progress Note - Madan Philippe 47 y o  male MRN: [id number]    Unit/Bed#: [facility] 607-01 Encounter: [id number]      Assessment:  Patient is a 46 yo male w complex PMHx s/p attempted STSG, I&D, washout + vac on 6/30 now POD 6 and second STSG now POD1  No acute events overnight  Now off TPN tolerating carb controlled diet well  No N/V  Pain is well controlled on current regimen  Making good urine  Two wound vacs in place - 1 on donor site in leg w no output overnight  Other covering STSG midline lower abdomen which put out 50 cc in 12 hours  VSS overnight  Hgb 8 6 --> 7 9  No white count    Plan:  - Keep wound vacs on (for both donor and recipient sites) for 5 days post op (take down 7/10)  - General surgery to do a small bedside washout of puncture wound adjacent to recipient wound vac site today, not under wound vac itself  - Continue to monitor labs  - Continue to monitor vac output  - Multimodal pain control    Subjective:   Patient feels well  Reports no fevers or chills  Denies HA, CP, SOB, N/V  Reports pain is well controlled  Objective:     Vitals: Blood pressure 120/62, pulse 97, temperature 98 2 °F (36 8 °C), temperature source Oral, resp  rate 18, height 5' 11" (1 803 m), weight (!) 223 kg (492 lb), SpO2 98 %  ,Body mass index is 68 62 kg/m²  Intake/Output Summary (Last 24 hours) at 7/6/2022 0842  Last data filed at 7/6/2022 0601  Gross per 24 hour   Intake 1879 01 ml   Output 1800 ml   Net 79 01 ml       Physical Exam:   Gen: patient well appearing, no acute distress, resting comfortably in bed  CV: normal rate, regular rhythm  Lungs: normal work of breathing, no respiratory distress  Abd: soft, appropriately tender near wound, wound vac in place anterior lower midline intact without leaks  Small puncture would to left of wound vac    Neuro: alert and oriented, MS grossly intact  MSK: moves all extremities normally    Invasive Devices  Report    Peripherally Inserted Central Catheter Line  Duration PICC Line 99/93/05 Right Basilic 22 days                Lab, Imaging and other studies: I have personally reviewed pertinent reports      VTE Pharmacologic Prophylaxis: Heparin  VTE Mechanical Prophylaxis: sequential compression device     Curly Baum MD  PGY1 Orthopedic Surgery

## 2022-07-06 NOTE — NURSING NOTE
Patient had 4 beats of vtach on telemetry around 0950  Cardiology notified  Patient returned to Afib in the 120s  /87  Scheduled 5mg IV lopressor given  Patient diaphoretic in room, but otherwise asymptomatic

## 2022-07-06 NOTE — QUICK NOTE
Nurse-Patient-Provider rounds were completed with the patient's nurse today, Richar springer  We discussed the plan is to warm bedside wound evaluation  Continue to monitor with Cardiology  Continue telemetry at this time       We reviewed all of the invasive devices/lines/telemetry orders   - None  DVT Prophylaxis:  - SCDs and Heparin gtt    Pain Assessment / Plan:  - Continue current analgesic regimen  Mobility Assessment / Plan:  - Activity as tolerated  Goals / Barriers for discharge:  - definitive wound care  - cardiology  evaluating daily  - continue heparin drip  - PT and OT  Case management following; case and discharge needs discussed  All questions and concerns were addressed  I spent greater than 11 minutes reviewing the plan with the patient and the nurse, and coordinating care for the day      Brodie Hollis PA-C  7/6/2022

## 2022-07-06 NOTE — QUICK NOTE
Post op check - General Surgery  Ziyad Ayers 47 y o  male MRN: [id number]  Unit/Bed#: [facility] 228-94 Encounter: [id number]    Assessment:  53yo M p/w SBO 2/2 chronic incarcerated ventral hernia s/p  6/14 ex lap, HARVEY 6/14, also s/p abdominal washout, repair of serosal tears  6/15 washout, abhera, VAC DPR  6/17 bridging vicryl mesh placement, wound vac placement  6/30 I&D, washout and vac placement  7/5 debridement, STSG, wound vac x2    Plan:  1  Diet -- Nino/CHO controlled  2  Pain control -- dilaudid, roxicodone, tylenol  3  Incentive spirometry  4  OOB, ambulate  5  DVT PPX: SQH    Subjective/Objective     Subjective: Pain well controlled, has been ambulating as tolerated  Objective:     Vitals: Blood pressure 133/65, pulse 89, temperature 98 3 °F (36 8 °C), resp  rate 17, height 5' 11" (1 803 m), weight (!) 223 kg (492 lb), SpO2 98 %  ,Body mass index is 68 62 kg/m²  I/O       07/04 0701  07/05 0700 07/05 0701  07/06 0700    P  O  920     I V  (mL/kg) 755 8 (3 4) 369 4 (1 7)    IV Piggyback  250    TPN 2303 1 1039 6    Total Intake(mL/kg) 3978 9 (17 8) 1659 (7 4)    Urine (mL/kg/hr) 1100 (0 2) 1300 (0 4)    Emesis/NG output      Drains  0    Stool 0 0    Total Output 1100 1300    Net +2878 9 +359          Unmeasured Urine Occurrence 1 x     Unmeasured Stool Occurrence 2 x 1 x          Physical Exam:  GEN: NAD  HEENT: MMM  CV: RRR  Lung: normal effort  Ab: Soft, NT/ND  Extrem: No CCE, well healing donor sites on BLE  Neuro:  A+Ox3, motor and sensation grossly intact

## 2022-07-07 LAB
ANION GAP SERPL CALCULATED.3IONS-SCNC: 2 MMOL/L (ref 4–13)
ANION GAP SERPL CALCULATED.3IONS-SCNC: 4 MMOL/L (ref 4–13)
APTT PPP: 75 SECONDS (ref 23–37)
BASOPHILS # BLD AUTO: 0.04 THOUSANDS/ΜL (ref 0–0.1)
BASOPHILS NFR BLD AUTO: 1 % (ref 0–1)
BUN SERPL-MCNC: 11 MG/DL (ref 5–25)
BUN SERPL-MCNC: 13 MG/DL (ref 5–25)
CALCIUM SERPL-MCNC: 9 MG/DL (ref 8.3–10.1)
CALCIUM SERPL-MCNC: 9 MG/DL (ref 8.3–10.1)
CHLORIDE SERPL-SCNC: 90 MMOL/L (ref 100–108)
CHLORIDE SERPL-SCNC: 90 MMOL/L (ref 100–108)
CO2 SERPL-SCNC: 33 MMOL/L (ref 21–32)
CO2 SERPL-SCNC: 35 MMOL/L (ref 21–32)
CREAT SERPL-MCNC: 0.59 MG/DL (ref 0.6–1.3)
CREAT SERPL-MCNC: 0.6 MG/DL (ref 0.6–1.3)
CREAT UR-MCNC: 74.8 MG/DL
EOSINOPHIL # BLD AUTO: 0.14 THOUSAND/ΜL (ref 0–0.61)
EOSINOPHIL NFR BLD AUTO: 3 % (ref 0–6)
ERYTHROCYTE [DISTWIDTH] IN BLOOD BY AUTOMATED COUNT: 15.8 % (ref 11.6–15.1)
GFR SERPL CREATININE-BSD FRML MDRD: 116 ML/MIN/1.73SQ M
GFR SERPL CREATININE-BSD FRML MDRD: 117 ML/MIN/1.73SQ M
GLUCOSE SERPL-MCNC: 121 MG/DL (ref 65–140)
GLUCOSE SERPL-MCNC: 121 MG/DL (ref 65–140)
GLUCOSE SERPL-MCNC: 122 MG/DL (ref 65–140)
GLUCOSE SERPL-MCNC: 124 MG/DL (ref 65–140)
GLUCOSE SERPL-MCNC: 147 MG/DL (ref 65–140)
GLUCOSE SERPL-MCNC: 161 MG/DL (ref 65–140)
GLUCOSE SERPL-MCNC: 181 MG/DL (ref 65–140)
HCT VFR BLD AUTO: 26.5 % (ref 36.5–49.3)
HGB BLD-MCNC: 7.9 G/DL (ref 12–17)
IMM GRANULOCYTES # BLD AUTO: 0.13 THOUSAND/UL (ref 0–0.2)
IMM GRANULOCYTES NFR BLD AUTO: 2 % (ref 0–2)
LYMPHOCYTES # BLD AUTO: 1.77 THOUSANDS/ΜL (ref 0.6–4.47)
LYMPHOCYTES NFR BLD AUTO: 32 % (ref 14–44)
MAGNESIUM SERPL-MCNC: 2.1 MG/DL (ref 1.6–2.6)
MCH RBC QN AUTO: 28.5 PG (ref 26.8–34.3)
MCHC RBC AUTO-ENTMCNC: 29.8 G/DL (ref 31.4–37.4)
MCV RBC AUTO: 96 FL (ref 82–98)
MONOCYTES # BLD AUTO: 0.78 THOUSAND/ΜL (ref 0.17–1.22)
MONOCYTES NFR BLD AUTO: 14 % (ref 4–12)
NEUTROPHILS # BLD AUTO: 2.69 THOUSANDS/ΜL (ref 1.85–7.62)
NEUTS SEG NFR BLD AUTO: 48 % (ref 43–75)
NRBC BLD AUTO-RTO: 0 /100 WBCS
OSMOLALITY UR/SERPL-RTO: 281 MMOL/KG (ref 282–298)
OSMOLALITY UR: 297 MMOL/KG
PHOSPHATE SERPL-MCNC: 3.1 MG/DL (ref 2.7–4.5)
PLATELET # BLD AUTO: 219 THOUSANDS/UL (ref 149–390)
PMV BLD AUTO: 11 FL (ref 8.9–12.7)
POTASSIUM SERPL-SCNC: 3.8 MMOL/L (ref 3.5–5.3)
POTASSIUM SERPL-SCNC: 4.1 MMOL/L (ref 3.5–5.3)
RBC # BLD AUTO: 2.77 MILLION/UL (ref 3.88–5.62)
SODIUM 24H UR-SCNC: 5 MOL/L
SODIUM SERPL-SCNC: 127 MMOL/L (ref 136–145)
SODIUM SERPL-SCNC: 127 MMOL/L (ref 136–145)
WBC # BLD AUTO: 5.55 THOUSAND/UL (ref 4.31–10.16)

## 2022-07-07 PROCEDURE — 94760 N-INVAS EAR/PLS OXIMETRY 1: CPT

## 2022-07-07 PROCEDURE — 84100 ASSAY OF PHOSPHORUS: CPT | Performed by: PHYSICIAN ASSISTANT

## 2022-07-07 PROCEDURE — 85730 THROMBOPLASTIN TIME PARTIAL: CPT | Performed by: SURGERY

## 2022-07-07 PROCEDURE — 80048 BASIC METABOLIC PNL TOTAL CA: CPT | Performed by: PHYSICIAN ASSISTANT

## 2022-07-07 PROCEDURE — 99024 POSTOP FOLLOW-UP VISIT: CPT | Performed by: SURGERY

## 2022-07-07 PROCEDURE — 83735 ASSAY OF MAGNESIUM: CPT | Performed by: PHYSICIAN ASSISTANT

## 2022-07-07 PROCEDURE — 80048 BASIC METABOLIC PNL TOTAL CA: CPT | Performed by: SURGERY

## 2022-07-07 PROCEDURE — 99232 SBSQ HOSP IP/OBS MODERATE 35: CPT | Performed by: INTERNAL MEDICINE

## 2022-07-07 PROCEDURE — 84300 ASSAY OF URINE SODIUM: CPT | Performed by: PHYSICIAN ASSISTANT

## 2022-07-07 PROCEDURE — 94640 AIRWAY INHALATION TREATMENT: CPT

## 2022-07-07 PROCEDURE — 85025 COMPLETE CBC W/AUTO DIFF WBC: CPT | Performed by: PHYSICIAN ASSISTANT

## 2022-07-07 PROCEDURE — 83930 ASSAY OF BLOOD OSMOLALITY: CPT | Performed by: PHYSICIAN ASSISTANT

## 2022-07-07 PROCEDURE — 82570 ASSAY OF URINE CREATININE: CPT | Performed by: PHYSICIAN ASSISTANT

## 2022-07-07 PROCEDURE — 83935 ASSAY OF URINE OSMOLALITY: CPT | Performed by: PHYSICIAN ASSISTANT

## 2022-07-07 PROCEDURE — 82948 REAGENT STRIP/BLOOD GLUCOSE: CPT

## 2022-07-07 RX ADMIN — OXYCODONE HYDROCHLORIDE 10 MG: 5 SOLUTION ORAL at 18:24

## 2022-07-07 RX ADMIN — TORSEMIDE 40 MG: 20 TABLET ORAL at 22:37

## 2022-07-07 RX ADMIN — ACETAMINOPHEN 975 MG: 325 TABLET, FILM COATED ORAL at 15:29

## 2022-07-07 RX ADMIN — HYDROMORPHONE HYDROCHLORIDE 1 MG: 1 INJECTION, SOLUTION INTRAMUSCULAR; INTRAVENOUS; SUBCUTANEOUS at 02:19

## 2022-07-07 RX ADMIN — METHOCARBAMOL TABLETS 750 MG: 750 TABLET, COATED ORAL at 12:21

## 2022-07-07 RX ADMIN — METOCLOPRAMIDE HYDROCHLORIDE 10 MG: 5 INJECTION INTRAMUSCULAR; INTRAVENOUS at 06:13

## 2022-07-07 RX ADMIN — QUETIAPINE FUMARATE 50 MG: 25 TABLET ORAL at 22:37

## 2022-07-07 RX ADMIN — METOPROLOL TARTRATE 25 MG: 25 TABLET, FILM COATED ORAL at 15:29

## 2022-07-07 RX ADMIN — SPIRONOLACTONE 50 MG: 50 TABLET ORAL at 10:17

## 2022-07-07 RX ADMIN — LEVALBUTEROL HYDROCHLORIDE 1.25 MG: 1.25 SOLUTION, CONCENTRATE RESPIRATORY (INHALATION) at 07:47

## 2022-07-07 RX ADMIN — METHOCARBAMOL TABLETS 750 MG: 750 TABLET, COATED ORAL at 18:26

## 2022-07-07 RX ADMIN — BISACODYL 10 MG: 10 SUPPOSITORY RECTAL at 10:17

## 2022-07-07 RX ADMIN — IPRATROPIUM BROMIDE 0.5 MG: 0.5 SOLUTION RESPIRATORY (INHALATION) at 21:37

## 2022-07-07 RX ADMIN — IPRATROPIUM BROMIDE 0.5 MG: 0.5 SOLUTION RESPIRATORY (INHALATION) at 13:46

## 2022-07-07 RX ADMIN — PANTOPRAZOLE SODIUM 20 MG: 20 TABLET, DELAYED RELEASE ORAL at 06:13

## 2022-07-07 RX ADMIN — SENNOSIDES AND DOCUSATE SODIUM 1 TABLET: 8.6; 5 TABLET ORAL at 18:26

## 2022-07-07 RX ADMIN — LEVOTHYROXINE SODIUM 25 MCG: 25 TABLET ORAL at 06:13

## 2022-07-07 RX ADMIN — IPRATROPIUM BROMIDE 0.5 MG: 0.5 SOLUTION RESPIRATORY (INHALATION) at 07:47

## 2022-07-07 RX ADMIN — GABAPENTIN 300 MG: 250 SOLUTION ORAL at 10:20

## 2022-07-07 RX ADMIN — ACETAMINOPHEN 975 MG: 325 TABLET, FILM COATED ORAL at 22:37

## 2022-07-07 RX ADMIN — POLYETHYLENE GLYCOL 3350 17 G: 17 POWDER, FOR SOLUTION ORAL at 10:17

## 2022-07-07 RX ADMIN — METOCLOPRAMIDE HYDROCHLORIDE 10 MG: 5 INJECTION INTRAMUSCULAR; INTRAVENOUS at 23:49

## 2022-07-07 RX ADMIN — METOCLOPRAMIDE HYDROCHLORIDE 10 MG: 5 INJECTION INTRAMUSCULAR; INTRAVENOUS at 12:21

## 2022-07-07 RX ADMIN — METOPROLOL TARTRATE 25 MG: 25 TABLET, FILM COATED ORAL at 22:37

## 2022-07-07 RX ADMIN — METHOCARBAMOL TABLETS 750 MG: 750 TABLET, COATED ORAL at 06:13

## 2022-07-07 RX ADMIN — HEPARIN SODIUM 22 UNITS/KG/HR: 10000 INJECTION, SOLUTION INTRAVENOUS at 15:38

## 2022-07-07 RX ADMIN — METOCLOPRAMIDE HYDROCHLORIDE 10 MG: 5 INJECTION INTRAMUSCULAR; INTRAVENOUS at 18:26

## 2022-07-07 RX ADMIN — GABAPENTIN 300 MG: 250 SOLUTION ORAL at 22:37

## 2022-07-07 RX ADMIN — DIGOXIN 250 MCG: 250 TABLET ORAL at 10:17

## 2022-07-07 RX ADMIN — BUDESONIDE AND FORMOTEROL FUMARATE DIHYDRATE 2 PUFF: 160; 4.5 AEROSOL RESPIRATORY (INHALATION) at 10:28

## 2022-07-07 RX ADMIN — LEVALBUTEROL HYDROCHLORIDE 1.25 MG: 1.25 SOLUTION, CONCENTRATE RESPIRATORY (INHALATION) at 13:46

## 2022-07-07 RX ADMIN — HYDROMORPHONE HYDROCHLORIDE 1 MG: 1 INJECTION, SOLUTION INTRAMUSCULAR; INTRAVENOUS; SUBCUTANEOUS at 12:21

## 2022-07-07 RX ADMIN — OXYCODONE HYDROCHLORIDE 10 MG: 5 SOLUTION ORAL at 10:16

## 2022-07-07 RX ADMIN — SENNOSIDES AND DOCUSATE SODIUM 1 TABLET: 8.6; 5 TABLET ORAL at 10:17

## 2022-07-07 RX ADMIN — INSULIN LISPRO 1 UNITS: 100 INJECTION, SOLUTION INTRAVENOUS; SUBCUTANEOUS at 23:48

## 2022-07-07 RX ADMIN — GABAPENTIN 300 MG: 250 SOLUTION ORAL at 15:29

## 2022-07-07 RX ADMIN — LEVALBUTEROL HYDROCHLORIDE 1.25 MG: 1.25 SOLUTION, CONCENTRATE RESPIRATORY (INHALATION) at 21:37

## 2022-07-07 RX ADMIN — INSULIN LISPRO 1 UNITS: 100 INJECTION, SOLUTION INTRAVENOUS; SUBCUTANEOUS at 12:21

## 2022-07-07 RX ADMIN — HEPARIN SODIUM 22 UNITS/KG/HR: 10000 INJECTION, SOLUTION INTRAVENOUS at 06:13

## 2022-07-07 RX ADMIN — OXYCODONE HYDROCHLORIDE 10 MG: 5 SOLUTION ORAL at 22:38

## 2022-07-07 RX ADMIN — BUDESONIDE AND FORMOTEROL FUMARATE DIHYDRATE 2 PUFF: 160; 4.5 AEROSOL RESPIRATORY (INHALATION) at 22:43

## 2022-07-07 RX ADMIN — ACETAMINOPHEN 975 MG: 325 TABLET, FILM COATED ORAL at 06:13

## 2022-07-07 NOTE — PROGRESS NOTES
Cardiology Progress note  Unit/Bed#: [facility] 607-01 Encounter: [id number]        Ellie Hall 47 y o  male [id number]  Hospital Stay Days: 21    Assessment and Plan      Current Problem List   Principal Problem:    SBO  Active Problems:    Atrial fibrillation with RVR (HCC)    COPD (chronic obstructive pulmonary disease) (HCC)    Acute respiratory failure (HCC)    Assessment/Plan:    1   Atrial fibrillation with RVR  · Afib with moderate ventricular response on telemetry  · Switched from IV to PO amiodarone and digoxin  · Most recent digoxin level was 1  Plan  ? Continue PO medication as tolerated   ? Will sign off as patient is tolerating PO meds      Subjective    Patient is a 48 y/o male with a PMH of Afib, CHF (EF 55%), morbid obesity, and COPD who presented with vomitting and abdominal pain in early   Patient was found to have a SBO    Objective     Vitals: Temp (24hrs), Av °F (36 7 °C), Min:97 4 °F (36 3 °C), Max:98 4 °F (36 9 °C)  Current: Temperature: 98 4 °F (36 9 °C)  Patient Vitals for the past 24 hrs:   BP Temp Pulse Resp SpO2   22 0155 112/55 -- 90 20 100 %   22 2327 -- -- -- -- 96 %   22 2235 102/58 98 4 °F (36 9 °C) 99 18 95 %   22 2149 105/61 -- 87 -- 95 %   22 1950 -- -- -- -- 96 %   22 1853 (!) 82/46 98 1 °F (36 7 °C) 83 -- 94 %   22 1851 (!) 142/107 98 1 °F (36 7 °C) 87 16 95 %   22 1505 112/87 -- 104 16 93 %   22 1427 (!) 98/45 (!) 97 4 °F (36 3 °C) 96 16 96 %   22 1400 -- -- -- -- 97 %   22 1010 125/87 97 9 °F (36 6 °C) 67 -- 94 %   22 0753 -- -- -- -- 98 %    Body mass index is 68 62 kg/m²  Physical Exam:  Physical Exam  Vitals reviewed  Constitutional:       Appearance: He is obese  Cardiovascular:      Rate and Rhythm: Normal rate  Rhythm irregular  Pulmonary:      Effort: Pulmonary effort is normal       Breath sounds: Normal breath sounds           Invasive Devices  Report    Peripherally Hmong speaking, IPAD in room. Pt [place], pocket talker ordered to assist with using IPAD . VSS. Disoriented to time. LS with fine crackles and exp wheezes, using abd muscles. On 4L NC. KEYES. Tele SR with PVCs and PACs. BNP 18, 991 and trops 205. Trending trops, plan for echo today, NPO after midnight.    Inserted Central Catheter Line  Duration           PICC Line 67/65/21 Right Basilic 23 days                    Labs:   Results from last 7 days   Lab Units 07/07/22  0558 07/06/22  0250 07/05/22  0321 07/03/22  1246 07/03/22  1246 07/03/22  0505 07/02/22  0442 07/01/22  0559   WBC Thousand/uL 5 55 4 83 4 77  --  6 77 6 86 7 00 9 36   HEMOGLOBIN g/dL 7 9* 7 9* 8 6*  --  9 1* 9 2* 8 2* 8 6*   HEMATOCRIT % 26 5* 26 4* 28 2*  --  30 1* 29 4* 26 9* 27 9*   PLATELETS Thousands/uL 219 207 235  --  256 246 243 229   NEUTROS PCT % 48  --   --    < > 67  --   --   --    MONOS PCT % 14*  --   --   --  10  --   --   --    MONO PCT %  --   --  4  --   --  8  --   --     < > = values in this interval not displayed        Results from last 7 days   Lab Units 07/07/22  0558 07/06/22  0249 07/06/22  0245 07/05/22  1842 07/05/22  1016 07/05/22  0321 07/04/22  1811 07/04/22  0724 07/04/22  0612 07/03/22  1246 07/03/22  0505 07/02/22  0442 07/01/22  0559 07/01/22  0006   SODIUM mmol/L 127*  --  130*  --   --  130*  --   --  135* 134* 135* 134* 132*  --    POTASSIUM mmol/L 4 1  --  4 0  --   --  3 6  --   --  3 7 3 8 3 3* 3 6 4 8  --    CHLORIDE mmol/L 90*  --  91*  --   --  90*  --   --  92* 91* 91* 91* 93*  --    CO2 mmol/L 35*  --  33*  --   --  38*  --   --  37* 36* 36* 41* 36*  --    BUN mg/dL 13  --  19  --   --  18  --   --  23 26* 25 27* 21  --    CREATININE mg/dL 0 60  --  0 59*  --   --  0 64  --   --  0 71 0 78 0 72 0 73 0 61  --    CALCIUM mg/dL 9 0  --  8 7  --   --  8 6  --   --  9 0 8 9 9 1 8 9 8 8  --    MAGNESIUM mg/dL 2 1  --   --   --   --  2 1  --   --  2 3  --  2 4 2 2 2 5  --    PHOSPHORUS mg/dL 3 1  --   --   --   --  3 2  --   --  3 9  --  4 0 3 3 2 7  --    PTT seconds  --  67*  --  73* 53* 73* 131* 43*  --   --  75* 60* 81* 78*   EGFR ml/min/1 73sq m 116  --  117  --   --  113  --   --  108 104 108 107 115  --      Results from last 7 days   Lab Units 07/06/22  0249 07/05/22  1842 07/05/22  1016 07/05/22  0321 07/04/22  1811 07/04/22  0724 07/03/22  0505 07/02/22  0442 07/01/22  0559 07/01/22  0006   PTT seconds 67* 73* 53* 73* 131* 43* 75* 60* 81* 78*     Results from last 7 days   Lab Units 07/03/22  1246   LACTIC ACID mmol/L 1 7         No results found for: PHART, TNQ2HRL, PO2ART, TGA1XOI, Z0VNHDPD, BEART, SOURCE  No components found for: HIV1X2  No results found for: HAV, HEPAIGM, HEPBIGM, HEPBCAB, HBEAG, HEPCAB  No results found for: SPEP, UPEP   Lab Results   Component Value Date    HGBA1C 5 6 06/07/2022    HGBA1C 5 4 03/21/2022    HGBA1C 5 5 12/22/2021     No results found for: CHOL   Lab Results   Component Value Date    HDL 44 08/16/2016      Lab Results   Component Value Date    LDLCALC 80 08/16/2016      Lab Results   Component Value Date    TRIG 185 (H) 07/02/2022    TRIG 124 06/22/2022    TRIG 556 (H) 06/19/2022     No components found for: PROCAL      Micro:      Urinalysis:  No results found for: AMPHETUR, BDZUR, COCAINEUR, OPIATEUR, PCPUR, THCUR, ETOH, ACTMNPHEN, SALICYLATE       Invalid input(s): URIBILINOGEN        Intake and Outputs:  I/O       07/05 0701 07/06 0700 07/06 0701  07/07 0700 07/07 0701  07/08 0700    P  O        I V  (mL/kg) 589 4 (2 6) 720 5 (3 2)     IV Piggyback 250      TPN 1039 6      Total Intake(mL/kg) 1879 (8 4) 720 5 (3 2)     Urine (mL/kg/hr) 1750 (0 3) 1100 (0 2)     Drains 50 400     Stool 0 0     Total Output 1800 1500     Net +79 -779 5            Unmeasured Stool Occurrence 1 x 3 x         Nutrition:  Diet Nino/CHO Controlled; Consistent Carbohydrate Diet Level 2 (5 carb servings/75 grams CHO/meal)  Radiology Results:   XR chest portable   Final Result by Cristopher Hall MD (07/03 7966)         1  Technically limited study  2   Mild vascular and  interstitial prominence suggesting mild volume overload  3   Nasogastric tube as noted                       Workstation performed: [id number]         XR abdomen 1 view kub   Final Result by Cristopher Hall MD (52/53 1518)         1  Contrast opacifies nondilated colon and rectum  No high-grade obstruction noted  2   Endogastric tube terminates in the stomach  Workstation performed: [id number]         XR abdomen 1 view kub   Final Result by Ele Willis MD (06/30 9709)      Contrast has passed into the proximal descending colon  Mildly dilated loops of air-filled distal colon  A distal obstruction is not excluded, and follow-up radiograph could be considered at this time to confirm progression of contrast       The study was marked in [facility] for immediate notification  Workstation performed: [id number]         XR abdomen 1 vw portable   Final Result by Jonny Almonte MD (06/29 1323)      Enteric tube in the stomach  Workstation performed: [id number]         XR abdomen 1 view kub   Final Result by Nicolas Mireles DO (06/27 5735)      Enteric tube tip looped upon itself terminating in the region of the distal esophagus  Repositioning advised  Nonspecific bowel gas pattern  The study was marked in [facility] for immediate notification  Workstation performed: [id number]         XR chest portable   Final Result by Rita Wong DO (06/24 1026)   Diminished lung volumes  Pulmonary edema exaggerated by vascular crowding  Workstation performed: [id number]         FL barium swallow video w speech   Final Result by LINSEY CH (06/23 1137)      XR chest portable   Final Result by Nargis Martínez MD (06/22 1131)      Interval removal of the ET tube and right-sided central line with persistent NG tube and right-sided PICC line      Unchanged bilateral particularly left-sided airspace disease identified                  Workstation performed: [id number]         XR abdomen 1 view kub   Final Result by Nargis Martínez MD (06/22 1133)      NG tube in correct location        Persistent bilateral particularly left-sided pleuroparenchymal changes Persistent dilated small bowel loops in this context of small bowel obstruction         Workstation performed: [id number]         XR chest portable ICU   Final Result by Michael Lanier MD (06/19 0008)      1  Image quality limited but suspicious for pneumonia in the left lung base unchanged since the most recent prior study  Superimposed CHF would also be a consideration  2   Lines and tubes in satisfactory position  Workstation performed: [id number]         XR chest portable ICU   Final Result by Junior Freeman MD (06/17 2011)      Questioned left basilar infiltrate  Maddy Lee Heart shadow is enlarged and there is pulmonary vascular congestion concerning for CHF  Workstation performed: [id number]         XR chest portable ICU   Final Result by Heri Martinez MD (06/17 1043)      Pulmonary vascular congestion without a definitive consolidation or effusion  No pneumothorax                 Workstation performed: [id number]           Scheduled Medications:  acetaminophen, 975 mg, Q8H MARY  benzocaine, 2 spray, Once  bisacodyl, 10 mg, Daily  budesonide-formoterol, 2 puff, BID  digoxin, 250 mcg, Daily  gabapentin, 300 mg, TID  insulin lispro, 1-5 Units, Q6H MARY  ipratropium, 0 5 mg, TID  levalbuterol, 1 25 mg, TID  levothyroxine, 25 mcg, Early Morning  lidocaine, 1 application, Once  methocarbamol, 750 mg, Q6H MARY  metoclopramide, 10 mg, Q6H [facility]  metoprolol tartrate, 25 mg, Q8H  pantoprazole, 20 mg, Early Morning  polyethylene glycol, 17 g, Daily  QUEtiapine, 50 mg, HS  senna-docusate sodium, 1 tablet, BID  spironolactone, 50 mg, Daily  torsemide, 40 mg, BID      PRN MEDS:  heparin (porcine), 10,000 Units, Q1H PRN  heparin (porcine), 5,000 Units, Q1H PRN  HYDROmorphone, 1 mg, Q6H PRN  ondansetron, 4 mg, Q4H PRN  oxyCODONE, 10 mg, Q4H PRN  oxyCODONE, 5 mg, Q4H PRN      Last 24 Hour Meds: :   Medication Administration - last 24 hours from 07/06/2022 0731 to 07/07/2022 3188 Date/Time Order Dose Route Action Action by     07/06/2022 1949 levalbuterol (Shellye Taurus) inhalation solution 1 25 mg 1 25 mg Nebulization Given Radha Morales, RT     07/06/2022 1358 levalbuterol (XOPENEX) inhalation solution 1 25 mg 1 25 mg Nebulization Given Anton Jose Luis, RT     07/06/2022 0753 levalbuterol (XOPENEX) inhalation solution 1 25 mg 1 25 mg Nebulization Given Iwona Connell, RT     07/06/2022 1949 ipratropium (ATROVENT) 0 02 % inhalation solution 0 5 mg 0 5 mg Nebulization Given Radha Morales, RT     07/06/2022 1358 ipratropium (ATROVENT) 0 02 % inhalation solution 0 5 mg 0 5 mg Nebulization Given Diamantesusan Jose Luis, RT     07/06/2022 0752 ipratropium (ATROVENT) 0 02 % inhalation solution 0 5 mg 0 5 mg Nebulization Given Iwona Connell, RT     07/07/2022 7433 insulin lispro (HumaLOG) 100 units/mL subcutaneous injection 1-5 Units 1 Units Subcutaneous Not Given Ladarius Barbour RN     07/06/2022 2337 insulin lispro (HumaLOG) 100 units/mL subcutaneous injection 1-5 Units 1 Units Subcutaneous Given Ladarius Barbour RN     07/06/2022 1737 insulin lispro (HumaLOG) 100 units/mL subcutaneous injection 1-5 Units 1 Units Subcutaneous Not Given Yeyo Wheeler, FREDIS     07/06/2022 1124 insulin lispro (HumaLOG) 100 units/mL subcutaneous injection 1-5 Units 1 Units Subcutaneous Not Given Yeyo Wheeler, FREDIS     07/07/2022 6466 heparin (porcine) 25,000 units in 0 45% NaCl 250 mL infusion (premix) 22 Units/kg/hr Intravenous Andrewtnerephraimet Mahsa Barbour RN     07/06/2022 2004 heparin (porcine) 25,000 units in 0 45% NaCl 250 mL infusion (premix) 22 Units/kg/hr Intravenous [employer] 37 Ladarius Barbour RN     07/06/2022 1413 heparin (porcine) 25,000 units in 0 45% NaCl 250 mL infusion (premix) 22 Units/kg/hr Intravenous 88 Charmaine Lomeli RN     07/06/2022 8637 oxyCODONE (ROXICODONE) oral solution 10 mg 10 mg Oral Given Ladarius Brabour RN     07/06/2022 1645 oxyCODONE (ROXICODONE) oral solution 10 mg 10 mg Oral Given Evie Maradiaga Josie Denver, RN     07/06/2022 1800 senna-docusate sodium (SENOKOT S) 8 6-50 mg per tablet 1 tablet   Oral Canceled Entry Lizz Yañez, FREDIS     07/06/2022 5520 senna-docusate sodium (SENOKOT S) 8 6-50 mg per tablet 1 tablet 1 tablet Oral Given Lizz Yañez, FREDIS     07/06/2022 5989 spironolactone (ALDACTONE) tablet 50 mg 50 mg Per NG Tube Given Lizz Yañez, RN     07/06/2022 2150 QUEtiapine (SEROquel) tablet 50 mg 50 mg Oral Given Sb Monsalve RN     07/06/2022 0840 polyethylene glycol (MIRALAX) packet 17 g 17 g Oral Given Lizz Yañez, FREDIS     07/06/2022 2151 gabapentin (NEURONTIN) oral solution 300 mg 300 mg Oral Given Sb Monsalve RN     07/06/2022 1644 gabapentin (NEURONTIN) oral solution 300 mg 300 mg Oral Given Angelica Reyna RN     07/06/2022 0840 gabapentin (NEURONTIN) oral solution 300 mg 300 mg Oral Given Lizz Yañez, FREDIS     07/06/2022 2143 budesonide-formoterol (SYMBICORT) 160-4 5 mcg/act inhaler 2 puff 2 puff Inhalation Refused Sb Monsalve, FREDIS     07/06/2022 0840 budesonide-formoterol (SYMBICORT) 160-4 5 mcg/act inhaler 2 puff 2 puff Inhalation Given Lizz Yañez RN     07/06/2022 2151 torsemide (DEMADEX) tablet 40 mg 40 mg Oral Not Given Sb Monsalve RN     07/06/2022 0839 torsemide (DEMADEX) tablet 40 mg 40 mg Oral Given Lizz Yañez, FREDIS     07/07/2022 3054 levothyroxine tablet 25 mcg 25 mcg Oral Given Sb Monsalve RN     07/06/2022 0839 bisacodyl (DULCOLAX) rectal suppository 10 mg 10 mg Rectal Given Lizz Yañez, FREDIS     07/07/2022 0219 HYDROmorphone (DILAUDID) injection 1 mg 1 mg Intravenous Given Sb Monsalve RN     07/06/2022 1959 HYDROmorphone (DILAUDID) injection 1 mg 1 mg Intravenous Given Sb Monsalve RN     07/06/2022 0840 HYDROmorphone (DILAUDID) injection 1 mg 1 mg Intravenous Given Lizz Yañez RN     07/07/2022 3932 metoclopramide (REGLAN) injection 10 mg 10 mg Intravenous Given Sb Monsalve RN     07/06/2022 6175 metoclopramide (REGLAN) injection 10 mg 10 mg Intravenous Given Iman Comer, RN     07/06/2022 1800 metoclopramide (REGLAN) injection 10 mg   Intravenous Canceled Entry [facility]     07/06/2022 1644 metoclopramide (REGLAN) injection 10 mg 10 mg Intravenous Given Sissy [facility], RN     07/06/2022 1126 metoclopramide (REGLAN) injection 10 mg 10 mg Intravenous Given Gayle Fraser, RN     07/06/2022 0840 digoxin (LANOXIN) injection 250 mcg 250 mcg Intravenous Given Gayle Fraser, RN     07/06/2022 1008 metoprolol (LOPRESSOR) injection 5 mg 5 mg Intravenous Given Gayle Fraser, RN     07/07/2022 5764 pantoprazole (PROTONIX) EC tablet 20 mg 20 mg Oral Given [facility] Nahomi, RN     07/07/2022 5266 acetaminophen (TYLENOL) tablet 975 mg 975 mg [facility]     07/06/2022 2149 acetaminophen (TYLENOL) tablet 975 mg 975 mg Oral Refused [facility]     07/06/2022 1412 acetaminophen (TYLENOL) tablet 975 mg 975 mg Oral [facility]     07/07/2022 3804 methocarbamol (ROBAXIN) tablet 750 mg 750 mg Oral Given [facility], RN     07/06/2022 2337 methocarbamol (ROBAXIN) tablet 750 mg 750 mg Oral [facility]     07/06/2022 1800 methocarbamol (ROBAXIN) tablet 750 mg   Oral Canceled Entry [facility], RN     07/06/2022 1644 methocarbamol (ROBAXIN) tablet 750 mg 750 mg Oral Given [facility]iss[facility], RN     07/06/2022 1126 methocarbamol (ROBAXIN) tablet 750 mg 750 mg Oral Given Gayle Bria, RN     07/06/2022 1518 digoxin (LANOXIN) tablet 250 mcg   Oral Canceled Entry Gayle Bria, RN     07/06/2022 2336 metoprolol tartrate (LOPRESSOR) tablet 25 mg 25 mg Oral Given [facility], RN     07/06/2022 1644 metoprolol tartrate (LOPRESSOR) tablet 25 mg 25 mg Oral Given Deepa Kenia, RN          PLEASE NOTE:  This encounter was completed utilizing the M- Modal/Fluency Direct Speech Voice Recognition Software   Grammatical errors, random word insertions, pronoun errors and incomplete sentences are occasional consequences of the system due to software limitations, ambient noise and hardware issues  These may be missed by proof reading prior to affixing electronic signature  Any questions or concerns about the content, text or information contained within the body of this dictation should be directly addressed to the physician for clarification  Please do not hesitate to call me directly if you have any any questions or concerns          FRANCK Farley   PGY-1 Internal Medicine

## 2022-07-07 NOTE — PLAN OF CARE
Problem: Prexisting or High Potential for Compromised Skin Integrity  Goal: Skin integrity is maintained or improved  Description: INTERVENTIONS:  - Identify patients at risk for skin breakdown  - Assess and monitor skin integrity  - Assess and monitor nutrition and hydration status  - Monitor labs   - Assess for incontinence   - Turn and reposition patient  - Assist with mobility/ambulation  - Relieve pressure over bony prominences  - Avoid friction and shearing  - Provide appropriate hygiene as needed including keeping skin clean and dry  - Evaluate need for skin moisturizer/barrier cream  - Collaborate with interdisciplinary team   - Patient/family teaching  - Consider wound care consult   Outcome: Progressing     Problem: Potential for Falls  Goal: Patient will remain free of falls  Description: INTERVENTIONS:  - Educate patient/family on patient safety including physical limitations  - Instruct patient to call for assistance with activity   - Consult OT/PT to assist with strengthening/mobility   - Keep Call bell within reach  - Keep bed low and locked with side rails adjusted as appropriate  - Keep care items and personal belongings within reach  - Initiate and maintain comfort rounds  - Make Fall Risk Sign visible to staff  - Offer Toileting every 1 Hours, in advance of need  - Initiate/Maintain alarm  - Obtain necessary fall risk management equipment  - Apply yellow socks and bracelet for high fall risk patients  - Consider moving patient to room near nurses station  Outcome: Progressing     Problem: MOBILITY - ADULT  Goal: Maintain or return to baseline ADL function  Description: INTERVENTIONS:  -  Assess patient's ability to carry out ADLs; assess patient's baseline for ADL function and identify physical deficits which impact ability to perform ADLs (bathing, care of mouth/teeth, toileting, grooming, dressing, etc )  - Assess/evaluate cause of self-care deficits   - Assess range of motion  - Assess patient's mobility; develop plan if impaired  - Assess patient's need for assistive devices and provide as appropriate  - Encourage maximum independence but intervene and supervise when necessary  - Involve family in performance of ADLs  - Assess for home care needs following discharge   - Consider OT consult to assist with ADL evaluation and planning for discharge  - Provide patient education as appropriate  Outcome: Progressing  Goal: Maintains/Returns to pre admission functional level  Description: INTERVENTIONS:  - Perform BMAT or MOVE assessment daily    - Set and communicate daily mobility goal to care team and patient/family/caregiver  - Collaborate with rehabilitation services on mobility goals if consulted  - Perform Range of Motion 3 times a day  - Reposition patient every 2 hours  - Dangle patient 3 times a day  - Stand patient 3 times a day  - Ambulate patient 3 times a day  - Out of bed to chair 3 times a day   - Out of bed for meals 3 times a day  - Out of bed for toileting  - Record patient progress and toleration of activity level   Outcome: Progressing     Problem: Nutrition/Hydration-ADULT  Goal: Nutrient/Hydration intake appropriate for improving, restoring or maintaining nutritional needs  Description: Monitor and assess patient's nutrition/hydration status for malnutrition  Collaborate with interdisciplinary team and initiate plan and interventions as ordered  Monitor patient's weight and dietary intake as ordered or per policy  Utilize nutrition screening tool and intervene as necessary  Determine patient's food preferences and provide high-protein, high-caloric foods as appropriate       INTERVENTIONS:  - Monitor oral intake, urinary output, labs, and treatment plans  - Assess nutrition and hydration status and recommend course of action  - Evaluate amount of meals eaten  - Assist patient with eating if necessary   - Allow adequate time for meals  - Recommend/ encourage appropriate diets, oral nutritional supplements, and vitamin/mineral supplements  - Order, calculate, and assess calorie counts as needed  - Recommend, monitor, and adjust tube feedings and TPN/PPN based on assessed needs  - Assess need for intravenous fluids  - Provide specific nutrition/hydration education as appropriate  - Include patient/family/caregiver in decisions related to nutrition  Outcome: Progressing     Problem: PAIN - ADULT  Goal: Verbalizes/displays adequate comfort level or baseline comfort level  Description: Interventions:  - Encourage patient to monitor pain and request assistance  - Assess pain using appropriate pain scale  - Administer analgesics based on type and severity of pain and evaluate response  - Implement non-pharmacological measures as appropriate and evaluate response  - Consider cultural and social influences on pain and pain management  - Notify physician/advanced practitioner if interventions unsuccessful or patient reports new pain  Outcome: Progressing     Problem: INFECTION - ADULT  Goal: Absence or prevention of progression during hospitalization  Description: INTERVENTIONS:  - Assess and monitor for signs and symptoms of infection  - Monitor lab/diagnostic results  - Monitor all insertion sites, i e  indwelling lines, tubes, and drains  - Monitor endotracheal if appropriate and nasal secretions for changes in amount and color  - Mountain appropriate cooling/warming therapies per order  - Administer medications as ordered  - Instruct and encourage patient and family to use good hand hygiene technique  - Identify and instruct in appropriate isolation precautions for identified infection/condition  Outcome: Progressing     Problem: DISCHARGE PLANNING  Goal: Discharge to home or other facility with appropriate resources  Description: INTERVENTIONS:  - Identify barriers to discharge w/patient and caregiver  - Arrange for needed discharge resources and transportation as appropriate  - Identify discharge learning needs (meds, wound care, etc )  - Arrange for interpretive services to assist at discharge as needed  - Refer to Case Management Department for coordinating discharge planning if the patient needs post-hospital services based on physician/advanced practitioner order or complex needs related to functional status, cognitive ability, or social support system  Outcome: Progressing     Problem: Knowledge Deficit  Goal: Patient/family/caregiver demonstrates understanding of disease process, treatment plan, medications, and discharge instructions  Description: Complete learning assessment and assess knowledge base    Interventions:  - Provide teaching at level of understanding  - Provide teaching via preferred learning methods  Outcome: Progressing

## 2022-07-07 NOTE — PROGRESS NOTES
Progress Note - General Surgery   Randell King 47 y o  male MRN: [id number]  Unit/Bed#: [facility] 189-80 Encounter: [id number]    Assessment:  Patient is a 47 y o  male  now status post STSG/POD2  Afebrile,VS on 2LNC but BP labile  Hypertensive 142/107 @ 6:51pm then 2 minutes later Hypotensive 82/46  BP range () / ()  Reports appropriately sharp pain of L leg and pain of abdomen  UOP: 1100  VAC: 150 murky serosang fluid  AM labs pending      Plan:  Keep wound vacs on (for both donor and recipient sites) for 5 days post op (take down 7/10) per surgery team  Continue diet as tolerated  Continue heparin drip  Encourage IS  Prn Pain control  Maintain Wound Vac      Subjective/Objective     Subjective:   No acute events overnight  Events as noted above  Denies HA, N/V, SOB, chest pain  Passing flatus and reports BM yesterday  Pain appropriate on L leg donor site, and appropriate pain on midabdominal VAC site  Objective:    Blood pressure 112/55, pulse 90, temperature 98 4 °F (36 9 °C), resp  rate 20, height 5' 11" (1 803 m), weight (!) 223 kg (492 lb), SpO2 100 %  ,Body mass index is 68 62 kg/m²  Intake/Output Summary (Last 24 hours) at 7/7/2022 5136  Last data filed at 7/7/2022 0201  Gross per 24 hour   Intake 577 51 ml   Output 1250 ml   Net -672 49 ml       Invasive Devices  Report    Peripherally Inserted Central Catheter Line  Duration           PICC Line 29/20/90 Right Basilic 23 days                Physical Exam  Constitutional:       General: He is not in acute distress  Appearance: He is obese  He is not ill-appearing, toxic-appearing or diaphoretic  HENT:      Head: Normocephalic and atraumatic  Right Ear: External ear normal       Left Ear: External ear normal       Nose: Nose normal       Comments: NC inserted     Mouth/Throat:      Mouth: Mucous membranes are moist    Eyes:      Extraocular Movements: Extraocular movements intact        Conjunctiva/sclera: Conjunctivae normal  Cardiovascular:      Rate and Rhythm: Normal rate and regular rhythm  Pulses: Normal pulses  Heart sounds: Normal heart sounds  No murmur heard  No friction rub  No gallop  Pulmonary:      Effort: Pulmonary effort is normal       Breath sounds: Normal breath sounds  Abdominal:      General: There is no distension  Palpations: Abdomen is soft  Tenderness: There is abdominal tenderness (local to [facility] site)  There is no guarding or rebound  Comments: BS present   Musculoskeletal:      Cervical back: Normal range of motion  Skin:     General: Skin is warm and dry  Neurological:      Mental Status: He is alert     Psychiatric:         Mood and Affect: Mood normal          Behavior: Behavior normal              Results from last 7 days   Lab Units 07/06/22  0250 07/05/22  0321 07/03/22  1246   WBC Thousand/uL 4 83 4 77 6 77   HEMOGLOBIN g/dL 7 9* 8 6* 9 1*   HEMATOCRIT % 26 4* 28 2* 30 1*   PLATELETS Thousands/uL 207 235 256     Results from last 7 days   Lab Units 07/06/22  0245 07/05/22  0321 07/04/22  0612   POTASSIUM mmol/L 4 0 3 6 3 7   CHLORIDE mmol/L 91* 90* 92*   CO2 mmol/L 33* 38* 37*   BUN mg/dL 19 18 23   CREATININE mg/dL 0 59* 0 64 0 71   CALCIUM mg/dL 8 7 8 6 9 0     Results from last 7 days   Lab Units 07/06/22  0249 07/05/22  1842 07/05/22  1016   PTT seconds 67* 73* [address] [address]  General Surgery

## 2022-07-08 LAB
ALBUMIN SERPL BCP-MCNC: 2.4 G/DL (ref 3.5–5)
ALP SERPL-CCNC: 161 U/L (ref 46–116)
ALT SERPL W P-5'-P-CCNC: 20 U/L (ref 12–78)
ANION GAP SERPL CALCULATED.3IONS-SCNC: 3 MMOL/L (ref 4–13)
APTT PPP: 47 SECONDS (ref 23–37)
APTT PPP: 59 SECONDS (ref 23–37)
APTT PPP: 79 SECONDS (ref 23–37)
AST SERPL W P-5'-P-CCNC: 19 U/L (ref 5–45)
BILIRUB SERPL-MCNC: 1.01 MG/DL (ref 0.2–1)
BUN SERPL-MCNC: 11 MG/DL (ref 5–25)
CALCIUM ALBUM COR SERPL-MCNC: 10.4 MG/DL (ref 8.3–10.1)
CALCIUM SERPL-MCNC: 9.1 MG/DL (ref 8.3–10.1)
CHLORIDE SERPL-SCNC: 91 MMOL/L (ref 100–108)
CO2 SERPL-SCNC: 35 MMOL/L (ref 21–32)
CREAT SERPL-MCNC: 0.58 MG/DL (ref 0.6–1.3)
ERYTHROCYTE [DISTWIDTH] IN BLOOD BY AUTOMATED COUNT: 15.9 % (ref 11.6–15.1)
GFR SERPL CREATININE-BSD FRML MDRD: 118 ML/MIN/1.73SQ M
GLUCOSE SERPL-MCNC: 122 MG/DL (ref 65–140)
GLUCOSE SERPL-MCNC: 132 MG/DL (ref 65–140)
GLUCOSE SERPL-MCNC: 157 MG/DL (ref 65–140)
GLUCOSE SERPL-MCNC: 194 MG/DL (ref 65–140)
HCT VFR BLD AUTO: 25.7 % (ref 36.5–49.3)
HGB BLD-MCNC: 8 G/DL (ref 12–17)
MAGNESIUM SERPL-MCNC: 2.3 MG/DL (ref 1.6–2.6)
MCH RBC QN AUTO: 29.5 PG (ref 26.8–34.3)
MCHC RBC AUTO-ENTMCNC: 31.1 G/DL (ref 31.4–37.4)
MCV RBC AUTO: 95 FL (ref 82–98)
PHOSPHATE SERPL-MCNC: 3.1 MG/DL (ref 2.7–4.5)
PLATELET # BLD AUTO: 233 THOUSANDS/UL (ref 149–390)
PMV BLD AUTO: 10.9 FL (ref 8.9–12.7)
POTASSIUM SERPL-SCNC: 4 MMOL/L (ref 3.5–5.3)
PROT SERPL-MCNC: 7.4 G/DL (ref 6.4–8.2)
RBC # BLD AUTO: 2.71 MILLION/UL (ref 3.88–5.62)
SODIUM SERPL-SCNC: 129 MMOL/L (ref 136–145)
WBC # BLD AUTO: 5.27 THOUSAND/UL (ref 4.31–10.16)

## 2022-07-08 PROCEDURE — 99222 1ST HOSP IP/OBS MODERATE 55: CPT | Performed by: INTERNAL MEDICINE

## 2022-07-08 PROCEDURE — 99024 POSTOP FOLLOW-UP VISIT: CPT | Performed by: SURGERY

## 2022-07-08 PROCEDURE — 82948 REAGENT STRIP/BLOOD GLUCOSE: CPT

## 2022-07-08 PROCEDURE — 80053 COMPREHEN METABOLIC PANEL: CPT

## 2022-07-08 PROCEDURE — 94760 N-INVAS EAR/PLS OXIMETRY 1: CPT

## 2022-07-08 PROCEDURE — 83735 ASSAY OF MAGNESIUM: CPT

## 2022-07-08 PROCEDURE — 99232 SBSQ HOSP IP/OBS MODERATE 35: CPT | Performed by: INTERNAL MEDICINE

## 2022-07-08 PROCEDURE — 85730 THROMBOPLASTIN TIME PARTIAL: CPT | Performed by: SURGERY

## 2022-07-08 PROCEDURE — 85027 COMPLETE CBC AUTOMATED: CPT

## 2022-07-08 PROCEDURE — 84100 ASSAY OF PHOSPHORUS: CPT

## 2022-07-08 PROCEDURE — 94640 AIRWAY INHALATION TREATMENT: CPT

## 2022-07-08 RX ORDER — SODIUM CHLORIDE 9 MG/ML
75 INJECTION, SOLUTION INTRAVENOUS ONCE
Status: COMPLETED | OUTPATIENT
Start: 2022-07-08 | End: 2022-07-08

## 2022-07-08 RX ADMIN — BUDESONIDE AND FORMOTEROL FUMARATE DIHYDRATE 2 PUFF: 160; 4.5 AEROSOL RESPIRATORY (INHALATION) at 20:33

## 2022-07-08 RX ADMIN — SODIUM CHLORIDE 75 ML/HR: 0.9 INJECTION, SOLUTION INTRAVENOUS at 12:43

## 2022-07-08 RX ADMIN — IPRATROPIUM BROMIDE 0.5 MG: 0.5 SOLUTION RESPIRATORY (INHALATION) at 13:38

## 2022-07-08 RX ADMIN — ACETAMINOPHEN 975 MG: 325 TABLET, FILM COATED ORAL at 05:39

## 2022-07-08 RX ADMIN — SENNOSIDES AND DOCUSATE SODIUM 1 TABLET: 8.6; 5 TABLET ORAL at 17:48

## 2022-07-08 RX ADMIN — BUDESONIDE AND FORMOTEROL FUMARATE DIHYDRATE 2 PUFF: 160; 4.5 AEROSOL RESPIRATORY (INHALATION) at 08:59

## 2022-07-08 RX ADMIN — INSULIN LISPRO 1 UNITS: 100 INJECTION, SOLUTION INTRAVENOUS; SUBCUTANEOUS at 17:51

## 2022-07-08 RX ADMIN — PANTOPRAZOLE SODIUM 20 MG: 20 TABLET, DELAYED RELEASE ORAL at 05:39

## 2022-07-08 RX ADMIN — IPRATROPIUM BROMIDE 0.5 MG: 0.5 SOLUTION RESPIRATORY (INHALATION) at 19:41

## 2022-07-08 RX ADMIN — OXYCODONE HYDROCHLORIDE 10 MG: 5 SOLUTION ORAL at 03:42

## 2022-07-08 RX ADMIN — LEVALBUTEROL HYDROCHLORIDE 1.25 MG: 1.25 SOLUTION, CONCENTRATE RESPIRATORY (INHALATION) at 07:13

## 2022-07-08 RX ADMIN — HEPARIN SODIUM 5000 UNITS: 1000 INJECTION INTRAVENOUS; SUBCUTANEOUS at 06:44

## 2022-07-08 RX ADMIN — SENNOSIDES AND DOCUSATE SODIUM 1 TABLET: 8.6; 5 TABLET ORAL at 09:00

## 2022-07-08 RX ADMIN — HEPARIN SODIUM 24 UNITS/KG/HR: 10000 INJECTION, SOLUTION INTRAVENOUS at 11:36

## 2022-07-08 RX ADMIN — METOPROLOL TARTRATE 25 MG: 25 TABLET, FILM COATED ORAL at 16:51

## 2022-07-08 RX ADMIN — METHOCARBAMOL TABLETS 750 MG: 750 TABLET, COATED ORAL at 11:35

## 2022-07-08 RX ADMIN — OXYCODONE HYDROCHLORIDE 10 MG: 5 SOLUTION ORAL at 09:00

## 2022-07-08 RX ADMIN — LEVALBUTEROL HYDROCHLORIDE 1.25 MG: 1.25 SOLUTION, CONCENTRATE RESPIRATORY (INHALATION) at 13:38

## 2022-07-08 RX ADMIN — GABAPENTIN 300 MG: 250 SOLUTION ORAL at 17:58

## 2022-07-08 RX ADMIN — INSULIN LISPRO 1 UNITS: 100 INJECTION, SOLUTION INTRAVENOUS; SUBCUTANEOUS at 11:49

## 2022-07-08 RX ADMIN — HYDROMORPHONE HYDROCHLORIDE 1 MG: 1 INJECTION, SOLUTION INTRAMUSCULAR; INTRAVENOUS; SUBCUTANEOUS at 05:48

## 2022-07-08 RX ADMIN — HYDROMORPHONE HYDROCHLORIDE 1 MG: 1 INJECTION, SOLUTION INTRAMUSCULAR; INTRAVENOUS; SUBCUTANEOUS at 20:43

## 2022-07-08 RX ADMIN — METOPROLOL TARTRATE 25 MG: 25 TABLET, FILM COATED ORAL at 09:00

## 2022-07-08 RX ADMIN — TORSEMIDE 40 MG: 20 TABLET ORAL at 09:00

## 2022-07-08 RX ADMIN — HEPARIN SODIUM 22 UNITS/KG/HR: 10000 INJECTION, SOLUTION INTRAVENOUS at 01:02

## 2022-07-08 RX ADMIN — LEVALBUTEROL HYDROCHLORIDE 1.25 MG: 1.25 SOLUTION, CONCENTRATE RESPIRATORY (INHALATION) at 19:41

## 2022-07-08 RX ADMIN — ACETAMINOPHEN 975 MG: 325 TABLET, FILM COATED ORAL at 12:44

## 2022-07-08 RX ADMIN — QUETIAPINE FUMARATE 50 MG: 25 TABLET ORAL at 21:20

## 2022-07-08 RX ADMIN — METOCLOPRAMIDE HYDROCHLORIDE 10 MG: 5 INJECTION INTRAMUSCULAR; INTRAVENOUS at 17:48

## 2022-07-08 RX ADMIN — IPRATROPIUM BROMIDE 0.5 MG: 0.5 SOLUTION RESPIRATORY (INHALATION) at 07:13

## 2022-07-08 RX ADMIN — HEPARIN SODIUM 5000 UNITS: 1000 INJECTION INTRAVENOUS; SUBCUTANEOUS at 12:45

## 2022-07-08 RX ADMIN — METOCLOPRAMIDE HYDROCHLORIDE 10 MG: 5 INJECTION INTRAMUSCULAR; INTRAVENOUS at 11:34

## 2022-07-08 RX ADMIN — GABAPENTIN 300 MG: 250 SOLUTION ORAL at 21:20

## 2022-07-08 RX ADMIN — POLYETHYLENE GLYCOL 3350 17 G: 17 POWDER, FOR SOLUTION ORAL at 09:00

## 2022-07-08 RX ADMIN — METHOCARBAMOL TABLETS 750 MG: 750 TABLET, COATED ORAL at 05:39

## 2022-07-08 RX ADMIN — METHOCARBAMOL TABLETS 750 MG: 750 TABLET, COATED ORAL at 17:48

## 2022-07-08 RX ADMIN — METOCLOPRAMIDE HYDROCHLORIDE 10 MG: 5 INJECTION INTRAMUSCULAR; INTRAVENOUS at 05:39

## 2022-07-08 RX ADMIN — DIGOXIN 250 MCG: 250 TABLET ORAL at 09:00

## 2022-07-08 RX ADMIN — GABAPENTIN 300 MG: 250 SOLUTION ORAL at 09:00

## 2022-07-08 RX ADMIN — LEVOTHYROXINE SODIUM 25 MCG: 25 TABLET ORAL at 05:39

## 2022-07-08 RX ADMIN — OXYCODONE HYDROCHLORIDE 5 MG: 5 SOLUTION ORAL at 17:59

## 2022-07-08 RX ADMIN — HYDROMORPHONE HYDROCHLORIDE 1 MG: 1 INJECTION, SOLUTION INTRAMUSCULAR; INTRAVENOUS; SUBCUTANEOUS at 11:46

## 2022-07-08 RX ADMIN — HEPARIN SODIUM 26 UNITS/KG/HR: 10000 INJECTION, SOLUTION INTRAVENOUS at 20:36

## 2022-07-08 RX ADMIN — SPIRONOLACTONE 50 MG: 50 TABLET ORAL at 09:00

## 2022-07-08 NOTE — PLAN OF CARE
Problem: Prexisting or High Potential for Compromised Skin Integrity  Goal: Skin integrity is maintained or improved  Description: INTERVENTIONS:  - Identify patients at risk for skin breakdown  - Assess and monitor skin integrity  - Assess and monitor nutrition and hydration status  - Monitor labs   - Assess for incontinence   - Turn and reposition patient  - Assist with mobility/ambulation  - Relieve pressure over bony prominences  - Avoid friction and shearing  - Provide appropriate hygiene as needed including keeping skin clean and dry  - Evaluate need for skin moisturizer/barrier cream  - Collaborate with interdisciplinary team   - Patient/family teaching  - Consider wound care consult   Outcome: Progressing     Problem: Potential for Falls  Goal: Patient will remain free of falls  Description: INTERVENTIONS:  - Educate patient/family on patient safety including physical limitations  - Instruct patient to call for assistance with activity   - Consult OT/PT to assist with strengthening/mobility   - Keep Call bell within reach  - Keep bed low and locked with side rails adjusted as appropriate  - Keep care items and personal belongings within reach  - Initiate and maintain comfort rounds  - Make Fall Risk Sign visible to staff  - Offer Toileting every 1 Hours, in advance of need  - Initiate/Maintain alarm  - Obtain necessary fall risk management equipment  - Apply yellow socks and bracelet for high fall risk patients  - Consider moving patient to room near nurses station  Outcome: Progressing     Problem: MOBILITY - ADULT  Goal: Maintain or return to baseline ADL function  Description: INTERVENTIONS:  -  Assess patient's ability to carry out ADLs; assess patient's baseline for ADL function and identify physical deficits which impact ability to perform ADLs (bathing, care of mouth/teeth, toileting, grooming, dressing, etc )  - Assess/evaluate cause of self-care deficits   - Assess range of motion  - Assess patient's mobility; develop plan if impaired  - Assess patient's need for assistive devices and provide as appropriate  - Encourage maximum independence but intervene and supervise when necessary  - Involve family in performance of ADLs  - Assess for home care needs following discharge   - Consider OT consult to assist with ADL evaluation and planning for discharge  - Provide patient education as appropriate  Outcome: Progressing  Goal: Maintains/Returns to pre admission functional level  Description: INTERVENTIONS:  - Perform BMAT or MOVE assessment daily    - Set and communicate daily mobility goal to care team and patient/family/caregiver  - Collaborate with rehabilitation services on mobility goals if consulted  - Perform Range of Motion 3 times a day  - Reposition patient every 2 hours  - Dangle patient 3 times a day  - Stand patient 3 times a day  - Ambulate patient 3 times a day  - Out of bed to chair 3 times a day   - Out of bed for meals 3 times a day  - Out of bed for toileting  - Record patient progress and toleration of activity level   Outcome: Progressing     Problem: Nutrition/Hydration-ADULT  Goal: Nutrient/Hydration intake appropriate for improving, restoring or maintaining nutritional needs  Description: Monitor and assess patient's nutrition/hydration status for malnutrition  Collaborate with interdisciplinary team and initiate plan and interventions as ordered  Monitor patient's weight and dietary intake as ordered or per policy  Utilize nutrition screening tool and intervene as necessary  Determine patient's food preferences and provide high-protein, high-caloric foods as appropriate       INTERVENTIONS:  - Monitor oral intake, urinary output, labs, and treatment plans  - Assess nutrition and hydration status and recommend course of action  - Evaluate amount of meals eaten  - Assist patient with eating if necessary   - Allow adequate time for meals  - Recommend/ encourage appropriate diets, oral nutritional supplements, and vitamin/mineral supplements  - Order, calculate, and assess calorie counts as needed  - Recommend, monitor, and adjust tube feedings and TPN/PPN based on assessed needs  - Assess need for intravenous fluids  - Provide specific nutrition/hydration education as appropriate  - Include patient/family/caregiver in decisions related to nutrition  Outcome: Progressing

## 2022-07-08 NOTE — PROGRESS NOTES
Cardiology Progress note  Unit/Bed#: [facility] 607-01 Encounter: [id number]        Cici Benavides 47 y o  male [id number]  Hospital Stay Days: 24    Assessment and Plan      Current Problem List   Principal Problem:    SBO  Active Problems:    Atrial fibrillation with RVR (HCC)    COPD (chronic obstructive pulmonary disease) (HCC)    Acute respiratory failure (HCC)    Assessment/Plan:    1   Atrial fibrillation with RVR  · Afib with moderate ventricular response on telemetry  · Currently on PO Digoxin and lopressor  · Most recent digoxin level was 1  Plan  ? Continue PO medication as tolerated   ? Rate control appears adequate, will sign off today       Subjective      Patient is a 48 y/o male with a PMH of Afib, CHF (EF 55%), morbid obesity, and COPD who presented with vomitting and abdominal pain in early   Patient was found to have a SBO  Objective     Vitals: Temp (24hrs), Av 2 °F (36 8 °C), Min:97 5 °F (36 4 °C), Max:99 6 °F (37 6 °C)  Current: Temperature: 99 6 °F (37 6 °C)  Patient Vitals for the past 24 hrs:   BP Temp Pulse Resp SpO2 Weight   22 1338 -- -- -- -- 95 % --   22 1104 94/62 99 6 °F (37 6 °C) 86 -- 96 % --   22 0814 128/60 98 3 °F (36 8 °C) 92 16 98 % --   22 0713 -- -- -- -- 95 % --   22 0551 -- -- -- -- -- (!) 225 kg (495 lb 9 5 oz)   22 0217 133/61 97 5 °F (36 4 °C) 87 22 99 % --   223 -- 97 7 °F (36 5 °C) 82 -- 95 % --   22 133/51 -- 89 -- 98 % --   22 13351 -- 89 -- -- --   22 (!) 144/46 -- 92 -- 98 % --   22 -- -- -- -- 96 % --   22 110/68 98 7 °F (37 1 °C) 84 -- 95 % --   22 1444 109/69 97 5 °F (36 4 °C) 89 16 95 % --    Body mass index is 69 12 kg/m²  Physical Exam:  Physical Exam  Vitals and nursing note reviewed  Constitutional:       Appearance: He is well-developed  He is obese  HENT:      Head: Normocephalic and atraumatic     Eyes:      Conjunctiva/sclera: Conjunctivae normal    Cardiovascular:      Rate and Rhythm: Normal rate  Rhythm irregular  Heart sounds: No murmur heard  Pulmonary:      Effort: Pulmonary effort is normal  No respiratory distress  Breath sounds: Normal breath sounds  Abdominal:      Palpations: Abdomen is soft  Tenderness: There is no abdominal tenderness  Musculoskeletal:      Cervical back: Neck supple  Skin:     General: Skin is warm and dry  Neurological:      Mental Status: He is alert  Invasive Devices  Report    Peripherally Inserted Central Catheter Line  Duration           PICC Line 12/55/77 Right Basilic 25 days                    Labs:   Results from last 7 days   Lab Units 07/08/22  0530 07/07/22  0558 07/06/22  0250 07/05/22  0321 07/03/22  1246 07/03/22  1246 07/03/22  0505 07/02/22  0442   WBC Thousand/uL 5 27 5 55 4 83 4 77  --  6 77 6 86 7 00   HEMOGLOBIN g/dL 8 0* 7 9* 7 9* 8 6*  --  9 1* 9 2* 8 2*   HEMATOCRIT % 25 7* 26 5* 26 4* 28 2*  --  30 1* 29 4* 26 9*   PLATELETS Thousands/uL 233 219 207 235  --  256 246 243   NEUTROS PCT %  --  48  --   --    < > 67  --   --    MONOS PCT %  --  14*  --   --   --  10  --   --    MONO PCT %  --   --   --  4  --   --  8  --     < > = values in this interval not displayed        Results from last 7 days   Lab Units 07/08/22  1140 07/08/22  0530 07/07/22  1534 07/07/22  0558 07/06/22  0249 07/06/22  0245 07/05/22  1842 07/05/22  1016 07/05/22  0321 07/04/22  1811 07/04/22  0724 07/04/22  0612 07/03/22  1246 07/03/22  0505 07/02/22  0442   SODIUM mmol/L  --  129* 127* 127*  --  130*  --   --  130*  --   --  135* 134* 135* 134*   POTASSIUM mmol/L  --  4 0 3 8 4 1  --  4 0  --   --  3 6  --   --  3 7 3 8 3 3* 3 6   CHLORIDE mmol/L  --  91* 90* 90*  --  91*  --   --  90*  --   --  92* 91* 91* 91*   CO2 mmol/L  --  35* 33* 35*  --  33*  --   --  38*  --   --  37* 36* 36* 41*   BUN mg/dL  --  11 11 13  --  19  --   --  18  --   --  23 26* 25 27*   CREATININE mg/dL --  0 58* 0 59* 0 60  --  0 59*  --   --  0 64  --   --  0 71 0 78 0 72 0 73   CALCIUM mg/dL  --  9 1 9 0 9 0  --  8 7  --   --  8 6  --   --  9 0 8 9 9 1 8 9   ALK PHOS U/L  --  161*  --   --   --   --   --   --   --   --   --   --   --   --   --    ALT U/L  --  20  --   --   --   --   --   --   --   --   --   --   --   --   --    AST U/L  --  19  --   --   --   --   --   --   --   --   --   --   --   --   --    MAGNESIUM mg/dL  --  2 3  --  2 1  --   --   --   --  2 1  --   --  2 3  --  2 4 2 2   PHOSPHORUS mg/dL  --  3 1  --  3 1  --   --   --   --  3 2  --   --  3 9  --  4 0 3 3   PTT seconds 47* 59*  --  75* 67*  --  73* 53* 73* 131* 43*  --   --  75* 60*   EGFR ml/min/1 73sq m  --  118 117 116  --  117  --   --  113  --   --  108 104 108 107     Results from last 7 days   Lab Units 07/08/22  1140 07/08/22  0530 07/07/22  0558 07/06/22  0249 07/05/22  1842 07/05/22  1016 07/05/22  0321 07/04/22  1811 07/04/22  0724 07/03/22  0505 07/02/22  0442   PTT seconds 47* 59* 75* 67* 73* 53* 73* 131* 43* 75* 60*     Results from last 7 days   Lab Units 07/03/22  1246   LACTIC ACID mmol/L 1 7         No results found for: PHART, JGE8QIK, PO2ART, SHO0NIW, A9RQJJJO, BEART, SOURCE  No components found for: HIV1X2  No results found for: HAV, HEPAIGM, HEPBIGM, HEPBCAB, HBEAG, HEPCAB  No results found for: SPEP, UPEP   Lab Results   Component Value Date    HGBA1C 5 6 06/07/2022    HGBA1C 5 4 03/21/2022    HGBA1C 5 5 12/22/2021     No results found for: CHOL   Lab Results   Component Value Date    HDL 44 08/16/2016      Lab Results   Component Value Date    LDLCALC 80 08/16/2016      Lab Results   Component Value Date    TRIG 185 (H) 07/02/2022    TRIG 124 06/22/2022    TRIG 556 (H) 06/19/2022     No components found for: PROCAL      Micro:      Urinalysis:  No results found for: AMPHETUR, BDZUR, COCAINEUR, OPIATEUR, PCPUR, THCUR, ETOH, ACTMNPHEN, SALICYLATE       Invalid input(s): URIBILINOGEN        Intake and Outputs:  I/O 07/06 0701  07/07 0700 07/07 0701  07/08 0700 07/08 0701  07/09 0700    I V  (mL/kg) 720 5 (3 2)      IV Piggyback       TPN       Total Intake(mL/kg) 720 5 (3 2)      Urine (mL/kg/hr) 1100 (0 2) 1000 (0 2)     Drains 400 375 100    Stool 0 0     Total Output 1500 1375 100    Net -779 5 -1375 -100           Unmeasured Urine Occurrence  1 x     Unmeasured Stool Occurrence 3 x 1 x         Nutrition:  Diet Nino/CHO Controlled; Consistent Carbohydrate Diet Level 2 (5 carb servings/75 grams CHO/meal)  Radiology Results:   XR chest portable   Final Result by Harvey Segundo MD (07/03 1516)         1  Technically limited study  2   Mild vascular and  interstitial prominence suggesting mild volume overload  3   Nasogastric tube as noted  Workstation performed: [id number]         XR abdomen 1 view kub   Final Result by Harvey Segundo MD (25/50 4006)         1  Contrast opacifies nondilated colon and rectum  No high-grade obstruction noted  2   Endogastric tube terminates in the stomach  Workstation performed: [id number]         XR abdomen 1 view kub   Final Result by Jj Avitia MD (06/30 4949)      Contrast has passed into the proximal descending colon  Mildly dilated loops of air-filled distal colon  A distal obstruction is not excluded, and follow-up radiograph could be considered at this time to confirm progression of contrast       The study was marked in [facility] for immediate notification  Workstation performed: [id number]         XR abdomen 1 vw portable   Final Result by Adalberto Waddell MD (06/29 1323)      Enteric tube in the stomach  Workstation performed: [id number]         XR abdomen 1 view kub   Final Result by Kulwinder Tejeda DO (06/27 2889)      Enteric tube tip looped upon itself terminating in the region of the distal esophagus  Repositioning advised  Nonspecific bowel gas pattern        The study was marked in [facility] for immediate notification  Workstation performed: [id number]         XR chest portable   Final Result by Camden Giraldo DO (06/24 1026)   Diminished lung volumes  Pulmonary edema exaggerated by vascular crowding  Workstation performed: [id number]         FL barium swallow video w speech   Final Result by LINSEY CH (06/23 1137)      XR chest portable   Final Result by Michelle Mackenzie MD (06/22 1131)      Interval removal of the ET tube and right-sided central line with persistent NG tube and right-sided PICC line      Unchanged bilateral particularly left-sided airspace disease identified                  Workstation performed: [id number]         XR abdomen 1 view kub   Final Result by Michelle Mackenzie MD (06/22 1133)      NG tube in correct location  Persistent bilateral particularly left-sided pleuroparenchymal changes      Persistent dilated small bowel loops in this context of small bowel obstruction         Workstation performed: [id number]         XR chest portable ICU   Final Result by Nakul Berry MD (06/19 0008)      1  Image quality limited but suspicious for pneumonia in the left lung base unchanged since the most recent prior study  Superimposed CHF would also be a consideration  2   Lines and tubes in satisfactory position  Workstation performed: [id number]         XR chest portable ICU   Final Result by Rashi Fernandes MD (06/17 2011)      Questioned left basilar infiltrate  [facility]ced[facility] Prows Heart shadow is enlarged and there is pulmonary vascular congestion concerning for CHF  Workstation performed: [id number]         XR chest portable ICU   Final Result by Kobe Banks MD (06/17 1043)      Pulmonary vascular congestion without a definitive consolidation or effusion  No pneumothorax                 Workstation performed: [id number]           Scheduled Medications:  acetaminophen, 975 mg, Q8H [address]  benzocaine, 2 spray, Once  bisacodyl, 10 mg, Daily  budesonide-formoterol, 2 puff, BID  digoxin, 250 mcg, Daily  gabapentin, 300 mg, TID  insulin lispro, 1-5 Units, Q6H MARY  ipratropium, 0 5 mg, TID  levalbuterol, 1 25 mg, TID  levothyroxine, 25 mcg, Early Morning  lidocaine, 1 application, Once  methocarbamol, 750 mg, Q6H MARY  metoclopramide, 10 mg, Q6H [address]  metoprolol tartrate, 25 mg, Q8H  pantoprazole, 20 mg, Early Morning  polyethylene glycol, 17 g, Daily  QUEtiapine, 50 mg, HS  senna-docusate sodium, 1 tablet, BID      PRN MEDS:  heparin (porcine), 10,000 Units, Q1H PRN  heparin (porcine), 5,000 Units, Q1H PRN  HYDROmorphone, 1 mg, Q6H PRN  ondansetron, 4 mg, Q4H PRN  oxyCODONE, 10 mg, Q4H PRN  oxyCODONE, 5 mg, Q4H PRN      Last 24 Hour Meds: :   Medication Administration - last 24 hours from 07/07/2022 1352 to 07/08/2022 1352       Date/Time Order Dose Route Action Action by     07/08/2022 1338 levalbuterol (XOPENEX) inhalation solution 1 25 mg 1 25 mg Nebulization Given Alija Shavonne, RT     07/08/2022 0713 levalbuterol (XOPENEX) inhalation solution 1 25 mg 1 25 mg Nebulization Given Alija Shavonne, RT     07/07/2022 2137 levalbuterol (XOPENEX) inhalation solution 1 25 mg 1 25 mg Nebulization Given Lysbeth Rings, RT     07/08/2022 1338 ipratropium (ATROVENT) 0 02 % inhalation solution 0 5 mg 0 5 mg Nebulization Given Alija Shavonne, RT     07/08/2022 0713 ipratropium (ATROVENT) 0 02 % inhalation solution 0 5 mg 0 5 mg Nebulization Given Alija Shavonne, RT     07/07/2022 2137 ipratropium (ATROVENT) 0 02 % inhalation solution 0 5 mg 0 5 mg Nebulization Given Lysbeth Rings, RT     07/08/2022 1149 insulin lispro (HumaLOG) 100 units/mL subcutaneous injection 1-5 Units 1 Units Subcutaneous Given Mehrdad Berrios, RN     07/08/2022 0536 insulin lispro (HumaLOG) 100 units/mL subcutaneous injection 1-5 Units 1 Units Subcutaneous Not Given Russ Nair, RN     07/07/2022 2348 insulin lispro (HumaLOG) 100 units/mL subcutaneous injection 1-5 Units 1 Units Subcutaneous Given Gladys eFntons, RN     07/07/2022 1837 insulin lispro (HumaLOG) 100 units/mL subcutaneous injection 1-5 Units 1 Units Subcutaneous Not [facility]     07/08/2022 1248 heparin (porcine) 25,000 units in 0 45% NaCl 250 mL infusion (premix) 26 Units/kg/hr Intravenous Rate/Dose Change Luis Kalina, RN     07/08/2022 1136 heparin (porcine) 25,000 units in 0 45% NaCl 250 mL infusion (premix) 24 Units/kg/hr Intravenous [address] Luis Kalina, [facility]     07/08/2022 9048 heparin (porcine) 25,000 units in 0 45% NaCl 250 mL infusion (premix) 24 Units/kg/hr Intravenous Rate/Dose Change Silasruben Cain, RN     07/08/2022 0102 heparin (porcine) 25,000 units in 0 45% NaCl 250 mL infusion (premix) 22 Units/kg/hr Intravenous [address] Silasruben Cain, RN     07/07/2022 1538 heparin (porcine) 25,000 units in 0 45% NaCl 250 mL infusion (premix) 22 Units/kg/hr Intravenous [address] Luis Kalina, RN     07/08/2022 1245 heparin (porcine) injection 5,000 Units 5,000 Units Intravenous Given Luis Kalina, RN     07/08/2022 8936 heparin (porcine) injection 5,000 Units 5,000 Units Intravenous Given Gladys Cain, RN     07/08/2022 0900 oxyCODONE (ROXICODONE) oral solution 10 mg 10 mg Oral Given Luis Kalina, RN     07/08/2022 3319 oxyCODONE (ROXICODONE) oral solution 10 mg 10 mg Oral Given Gladys Fentons, RN     07/07/2022 2238 oxyCODONE (ROXICODONE) oral solution 10 mg 10 mg Oral Given Gladys Fentons, RN     07/07/2022 1824 oxyCODONE (ROXICODONE) oral solution 10 mg 10 mg Oral Given Luis Kalina, RN     07/08/2022 0900 senna-docusate sodium (SENOKOT S) 8 6-50 mg per tablet 1 tablet 1 tablet Oral Given Luis Kalina, RN     07/07/2022 1826 senna-docusate sodium (SENOKOT S) 8 6-50 mg per tablet 1 tablet 1 tablet Oral Given Luis Kalina, RN     07/08/2022 0900 spironolactone (ALDACTONE) tablet 50 mg 50 mg Per NG Tube Given Luis Kalina, RN     07/07/2022 2237 QUEtiapine (SEROquel) tablet 50 mg 50 mg Oral Given Prateek Gustafson Andrew, RN     07/08/2022 0900 polyethylene glycol (MIRALAX) packet 17 g 17 g Oral Given Gabriella Blinks, RN     07/08/2022 0900 gabapentin (NEURONTIN) oral solution 300 mg 300 mg Oral Given Gabriella Blinks, RN     07/07/2022 2237 gabapentin (NEURONTIN) oral solution 300 mg 300 mg Oral Given Finis Fish, RN     07/07/2022 1529 gabapentin (NEURONTIN) oral solution 300 mg 300 mg Oral Given Gabriella Blinks, RN     07/08/2022 0859 budesonide-formoterol (SYMBICORT) 160-4 5 mcg/act inhaler 2 puff 2 puff Inhalation Given Gabriella Blinks, RN     07/07/2022 2243 budesonide-formoterol (SYMBICORT) 160-4 5 mcg/act inhaler 2 puff 2 puff Inhalation Given Finis Fish, RN     07/08/2022 0900 torsemide (DEMADEX) tablet 40 mg 40 mg Oral Given Gabriella Blinks, RN     07/07/2022 2237 torsemide (DEMADEX) tablet 40 mg 40 mg Oral Given Finis Fish, RN     07/08/2022 7730 levothyroxine tablet 25 mcg 25 mcg Oral Given Finis Fish, RN     07/08/2022 0900 bisacodyl (DULCOLAX) rectal suppository 10 mg 10 mg Rectal Refused Gabriella Blinks, RN     07/08/2022 1146 HYDROmorphone (DILAUDID) injection 1 mg 1 mg Intravenous Given Gabriella Blinks, RN     07/08/2022 0548 HYDROmorphone (DILAUDID) injection 1 mg 1 mg Intravenous Given Finis Fish, RN     07/08/2022 1134 metoclopramide (REGLAN) injection 10 mg 10 mg Intravenous Given Gabriella Blinks, RN     07/08/2022 0539 metoclopramide (REGLAN) injection 10 mg 10 mg Intravenous Given Finis Fish, RN     07/07/2022 2349 metoclopramide (REGLAN) injection 10 mg 10 mg Intravenous Given Finis Fish, RN     07/07/2022 1826 metoclopramide (REGLAN) injection 10 mg 10 mg Intravenous Given Gabriella Blinks, RN     07/08/2022 0539 pantoprazole (PROTONIX) EC tablet 20 mg 20 mg Oral Given Finis Fish, RN     07/08/2022 1400 acetaminophen (TYLENOL) tablet 975 mg 0 mg Oral Hold Gabriella Blinks, RN     07/08/2022 1244 acetaminophen (TYLENOL) tablet 975 mg 975 mg Oral Given Gabriella Blinks, RN     07/08/2022 0539 acetaminophen (TYLENOL) tablet 975 mg 975 mg Oral Given [facility], RN     07/07/2022 2237 acetaminophen (TYLENOL) tablet 975 mg 975 mg Oral Given [facility], RN     07/07/2022 1529 acetaminophen (TYLENOL) tablet 975 mg 975 mg Oral Given [facility], RN     07/08/2022 1135 methocarbamol (ROBAXIN) tablet 750 mg 750 mg Oral Given [facility], RN     07/08/2022 0539 methocarbamol (ROBAXIN) tablet 750 mg 750 mg Oral Given [facility], RN     07/07/2022 2300 methocarbamol (ROBAXIN) tablet 750 mg 750 mg Oral Not Given [facility], RN     07/07/2022 1826 methocarbamol (ROBAXIN) tablet 750 mg 750 mg Oral Given [facility], RN     07/08/2022 0900 digoxin (LANOXIN) tablet 250 mcg 250 mcg Oral Given [facility], RN     07/08/2022 0900 metoprolol tartrate (LOPRESSOR) tablet 25 mg 25 mg Oral Given [facility], RN     07/07/2022 2237 metoprolol tartrate (LOPRESSOR) tablet 25 mg 25 mg Oral Given [facility], RN     07/07/2022 1529 metoprolol tartrate (LOPRESSOR) tablet 25 mg 25 mg Oral Given [facility], RN     07/08/2022 1243 sodium chloride 0 9 % infusion 75 mL/hr Intravenous New Bag [facility], RN          PLEASE NOTE:  This encounter was completed utilizing the [employer] Direct Speech Voice Recognition Software  Grammatical errors, random word insertions, pronoun errors and incomplete sentences are occasional consequences of the system due to software limitations, ambient noise and hardware issues  These may be missed by proof reading prior to affixing electronic signature  Any questions or concerns about the content, text or information contained within the body of this dictation should be directly addressed to the physician for clarification  Please do not hesitate to call me directly if you have any any questions or concerns        FRANCK Jones   PGY-1 Internal Medicine

## 2022-07-08 NOTE — PROGRESS NOTES
Progress Note - Jayro Pruitt 47 y o  male MRN: [id number]    Unit/Bed#: [facility] 607-01 Encounter: [id number]      Assessment:  Patient is a 46 yo male w complex PMHx who presented w a SBO and ventral hernia now POD8 from an attempted STSG, I&D, washout + vac placement on 6/30 and POD3 from a second STSG on 7/5  Plan:  - Keep wound vacs on continuous suction at 125 mmHg (for both donor and STSG sites) for a total of 5 days post op (take down 7/10)  - General surgery to continue daily dressing changes of small puncture wound adjacent to STSG  - Continue to monitor Hgb and WBC  - Continue to monitor Na  - Continue to monitor wound vac outputs  - Multimodal pain and nausea control PRN  - Please tiger text on call surgery resident with questions/concerns    Subjective:   No acute events overnight  VSS stable, one episode of HTN to 144/46 at 10:30 last night  Continues to tolerate carb controlled diet well without N/V/D  Pain is well controlled on current multimodal regimen  Voiding bladder spontaneously  Two wound vacs in place are both c/d/i and without air leaks set to continuous suction at 125 mmHg  Wound vac covering donor site had 150 in 24 hours  Wound vac covering STSG site had 150 cc output in previous 12 hours and 225 cc over 24 hours  Patient feels well  Reports no subjective fevers or chills  Denies HA, CP, SOB, N/V,D      Hgb: 7 9 --> 8 0  WBC: 5 55 -->  5 27  Na: 27 --> CMP pending  Serum osm: 281 (slightly low)  Urine osm: 297 (normal)    Objective:     Vitals: Blood pressure 133/61, pulse 87, temperature 97 5 °F (36 4 °C), resp  rate 22, height 5' 11" (1 803 m), weight (!) 223 kg (492 lb), SpO2 99 %  ,Body mass index is 68 62 kg/m²        Intake/Output Summary (Last 24 hours) at 7/8/2022 0514  Last data filed at 7/7/2022 2300  Gross per 24 hour   Intake 143 ml   Output 1325 ml   Net -1182 ml       Physical Exam:   Gen: patient well appearing, no acute distress, resting comfortably in bed  CV: warm, well perfused  Lungs: normal work of breathing, no respiratory distress  Abd: soft, non-distended, wound vac in place in anterior lower midline c/d/i without air leaks set to continuous suction at 125 mmHg w serosanguinous output  Neuro: alert and oriented, MS grossly intact  MSK: moves all extremities normally    Invasive Devices  Report    Peripherally Inserted Central Catheter Line  Duration           PICC Line 78/97/35 Right Basilic 24 days                Lab, Imaging and other studies: I have personally reviewed pertinent reports      VTE Pharmacologic Prophylaxis: Heparin  VTE Mechanical Prophylaxis: sequential compression device     Leopoldo Grumbling, MD  PGY1 Orthopedic Surgery

## 2022-07-08 NOTE — CONSULTS
Consultation - Nephrology   Graciela Kuhn 47 y o  male MRN: [id number]  Unit/Bed#: [facility] 607-01 Encounter: [id number]    ASSESSMENT and PLAN:  Hyponatremia  Etiology: Suspect secondary to volume depletion in the setting of diuretics, open wound with wound VAC, decreased oral intake  Assessment:   No history of hyponatremia seen   Sodium normal 136 on 06/25/2022   Sodium below normal at 135 starting on 06/26/2022   Current sodium 129   On torsemide and spironolactone  Workup:  · Urine sodium-five, urine osmolality 297, serum osmolality 281-suggestive of volume depletion  Plan:   Will discontinue torsemide and spironolactone-last dose today   Will give 1 L normal saline at 50 cc an hour   Will check BMP q 8 hours starting at 8:00 p m  tonight   Call Renal if sodium is less than 129 or greater than 135   Avoid rapid correction no more than eight points in 24 hours   Will continue monitor I/O, lab values volume status    Blood pressure/hypertension:  Assessment and Plan:   Current BP on the lower side 94/62   Home medications include:  Lopressor 100 mg every 12 hours, spironolactone 50 mg daily, torsemide 40 mg 2 times daily   Current medications include:  Spironolactone 50 mg daily, torsemide 40 mg 2 times daily, digoxin 250 mcg daily, Lopressor 25 mg q 8 hours   Maximize hemodynamics to maintain MAP >65   Avoid hypotension or fluctuations in blood pressure   Will continue to trend    H&H/anemia:  Likely in setting of acute illness  Assessment and Plan:   Current hemoglobin 8 0   Consider checking iron stores   Would avoid IV iron in the setting of treatment of small-bowel obstruction   Per primary team   Transfuse if hemoglobin less than 7 0    Small-bowel obstruction/ventral hernia  Assessment and Plan:  · General surgery following  · Status post laparotomy extensive lysis of adhesions wound VAC placement 6/14/2022  · Status post laparotomy with repair of serosal injury on 06/15/2022  · Status post abdominal washout repair of serosal tear, vicryl mesh and partial closure application of wound VAC 6/17/2022  · Wound VAC in place  · Currently not on antibiotic      HISTORY OF PRESENT ILLNESS:  Requesting Physician: Blanca Galaviz MD  Reason for Consult:  Hyponatremia    Dagoberto Mandujano is a 47 y o  male who has past medical history of morbid obesity, COPD, diabetes, GERD, heart failure, hypertension, hypothyroid who initially presented to [facility] with abdominal pain and constipation was found to have a small-bowel obstruction and ventral hernia  He was transferred to [facility] and underwent complicated ex lap and removal of adhesions  Ultimately transferred to [facility] for higher level of care  Since 6/26/2022  sodium started to decrease and currently 129 and a renal consultation is requested today hyponatremia  On discussion, patient reports feeling okay starting to eat but not much  Medication list reviewed and has been on spironolactone and torsemide        PAST MEDICAL HISTORY:  Past Medical History:   Diagnosis Date    Afib [facility] )     Anemia     Anxiety     [facility]     Cardiac disease     Cellulitis     COPD [facility]chronic obstructive pulmonary disease) [facility]     Depression     Diabetes mellitus [facility]     DVT (deep venous thrombosis) (HCC)     GERD (gastroesophageal reflux disease)     HBP (high blood pressure)     Heart failure (HCC)     Hx of blood clots     Hypertension     Hypokalemia     Hypothyroid     Obesity     Psychiatric disorder        PAST SURGICAL HISTORY:  Past Surgical History:   Procedure Laterality Date    ABDOMINAL HERNIA REPAIR  05/2019    CHOLECYSTECTOMY      Lap    GASTRECTOMY SLEEVE LAPAROSCOPIC  08/2018    INCISION AND DRAINAGE OF WOUND Bilateral 12/01/2021    Procedure: INCISION AND DRAINAGE (I&D) HEAD/FACE;  Surgeon: Alfred Gaston MD;  Location:  MAIN OR;  Service: General    IR PICC PLACEMENT DOUBLE LUMEN  06/13/2022    IVC FILTER INSERTION  2018    KNEE SURGERY Right     open wound, injury     LAPAROTOMY N/A 6/14/2022    Procedure: LAPAROTOMY EXPLORATORY, EXTENSIVE LYSIS OF ADHESIONS, APPLICATION OF ABTHERA WOUND VAC;  Surgeon: Daniela Andrews MD;  Location: [address] [place] [address];  Service: General    LAPAROTOMY N/A 6/15/2022    Procedure: LAPAROTOMY EXPLORATORY WITH REPAIR OF SEROSAL INJURY;  Surgeon: Savannah Solorzano MD;  Location: [facility];  Service: General    LEG SURGERY Right 2009    SPLIT THICKNESS SKIN GRAFT N/A 6/30/2022    Procedure: incision and drainage, wash out vac change;  Surgeon: Suzanne Parrish DO;  [facility];  Service: General    SPLIT THICKNESS SKIN GRAFT N/A 7/5/2022    Procedure: PREPERATION RECIPIENT STSG SITE, DEBRIDEMENT NONVIABLE SKIN EDGE; PREPERATION RECIPIENT SKIN GRAFT SITE; APPLICATION OF SKIN SUBSTITUTE TO DONOR SITE; APPLICATION WOUND VAC X2 GREATER THAN 50CM;   Surgeon: Raman Levine DO;  [facility];  Service: General    US GUIDED VASCULAR ACCESS  08/06/2018    VAC DRESSING APPLICATION N/A 5/87/9658    Procedure: CHANGE DRESSING/VAC ABDOMEN;  Surgeon: Savannah Solorzano MD;  [facility];  Service: General    VAC DRESSING APPLICATION N/A 4/63/6539    Procedure: ABDOMINAL WASHOUT, REPAIR OF SEROSAL TEARS,BRIDING VICRYL MESH, PARTIAL CLOSURE, APPLICATION OF WOUND VAC;  Surgeon: Suzanne Parrish DO;  [facility];  Service: General    VAC DRESSING APPLICATION N/A 1/58/4818    Procedure: CHANGE DRESSING/VAC ABDOMEN;  Surgeon: Suzanne Parrish DO;  [facility];  Service: General       ALLERGIES:  Allergies   Allergen Reactions    Penicillins Hives       SOCIAL HISTORY:  Social History     Substance and Sexual Activity   Alcohol Use Not Currently     Social History     Substance and Sexual Activity   Drug Use No     Social History     Tobacco Use   Smoking Status Former Smoker    Packs/day: 1 00    Years: 15 00    Pack years: 15 00   Smokeless Tobacco Never Used   Tobacco Comment    1 5ppd x 23 years, quit 2014       FAMILY HISTORY:  Family History   Problem Relation Age of Onset   [facility] Lung cancer Father     Diabetes Maternal Aunt     Heart attack Mother     Clotting disorder Mother     Hypertension Mother     Heart failure Mother     Diabetes Mother     Atrial fibrillation Mother     Sleep apnea Mother     Obesity Mother     Asthma Sister     Stroke Neg Hx     Anuerysm Neg Hx     Arrhythmia Neg Hx     Hyperlipidemia Neg Hx         pt unsure    Fainting Neg Hx        MEDICATIONS:    Current Facility-Administered Medications:     acetaminophen (TYLENOL) tablet 975 mg, 975 mg, Oral, Q8H [facility], Marshal Strong DO, 975 mg at 07/08/22 1244    benzocaine (HURRICAINE) 20 % mucosal spray 2 spray, 2 spray, Mucosal, Once, Greg Strong DO    bisacodyl (DULCOLAX) rectal suppository 10 mg, 10 mg, Rectal, Daily, Greg Strong DO, 10 mg at 07/07/22 1017    budesonide-formoterol (SYMBICORT) 160-4 5 mcg/act inhaler 2 puff, 2 puff, Inhalation, BID, Greg Strong DO, 2 puff at 07/08/22 0859    digoxin (LANOXIN) tablet 250 mcg, 250 mcg, Oral, Daily, Jaida Turner MD, 250 mcg at 07/08/22 0900    gabapentin (NEURONTIN) oral solution 300 mg, 300 mg, Oral, TID, Greg Strong DO, 300 mg at 07/08/22 0900    heparin (porcine) 25,000 units in 0 45% NaCl 250 mL infusion (premix), 3-30 Units/kg/hr (Order-Specific), Intravenous, Titrated, Greg Strong DO, Last Rate: 32 5 mL/hr at 07/08/22 1248, 26 Units/kg/hr at 07/08/22 1248    heparin (porcine) injection 10,000 Units, 10,000 Units, Intravenous, Q1H PRN, Valeria Loan, DO, 10,000 Units at 06/18/22 1803    heparin (porcine) injection 5,000 Units, 5,000 Units, Intravenous, Q1H PRN, Valeria Loan, DO, 5,000 Units at 07/08/22 1245    HYDROmorphone (DILAUDID) injection 1 mg, 1 mg, Intravenous, Q6H PRN, Greg Strong DO, 1 mg at 07/08/22 1146    insulin lispro (HumaLOG) 100 units/mL subcutaneous injection 1-5 Units, 1-5 Units, Subcutaneous, Q6H [address], 1 Units at 07/08/22 1149 **AND** Fingerstick Glucose (POCT), , , Q6H, Gissell Strong DO    ipratropium (ATROVENT) 0 02 % inhalation solution 0 5 mg, 0 5 mg, Nebulization, TID, Gissell Strong DO, 0 5 mg at 07/08/22 1385    levalbuterol (XOPENEX) inhalation solution 1 25 mg, 1 25 mg, Nebulization, TID, Gissell Strong DO, 1 25 mg at 07/08/22 2567    levothyroxine tablet 25 mcg, 25 mcg, Oral, Early Morning, Gissell Strong DO, 25 mcg at 07/08/22 0539    lidocaine (URO-JET) 2 % urethral/mucosal gel 1 application, 1 application, Urethral, Once, Iraj Her DO    methocarbamol (ROBAXIN) tablet 750 mg, 750 mg, Oral, Q6H [address], Marshal Strong, DO, 750 mg at 07/08/22 1135    metoclopramide (REGLAN) injection 10 mg, 10 mg, Intravenous, Q6H [address], Marshal Strong, DO, 10 mg at 07/08/22 1134    metoprolol tartrate (LOPRESSOR) tablet 25 mg, 25 mg, Oral, Q8H, Aracely Rahman MD, 25 mg at 07/08/22 0900    ondansetron (ZOFRAN) injection 4 mg, 4 mg, Intravenous, Q4H PRN, Gissell Strong DO, 4 mg at 07/03/22 0439    oxyCODONE (ROXICODONE) oral solution 10 mg, 10 mg, Oral, Q4H PRN, Gissell Strong DO, 10 mg at 07/08/22 0900    oxyCODONE (ROXICODONE) oral solution 5 mg, 5 mg, Oral, Q4H PRN, Gissell Strong DO, 5 mg at 07/06/22 0626    pantoprazole (PROTONIX) EC tablet 20 mg, 20 mg, Oral, Early Morning, Marshal P DO Ligia, 20 mg at 07/08/22 0539    polyethylene glycol (MIRALAX) packet 17 g, 17 g, Oral, Daily, Marshal Strong DO, 17 g at 07/08/22 0900    QUEtiapine (SEROquel) tablet 50 mg, 50 mg, Oral, HS, Gissell Strong DO, 50 mg at 07/07/22 2237    senna-docusate sodium (SENOKOT S) 8 6-50 mg per tablet 1 tablet, 1 tablet, Oral, BID, Gissell Strong DO, 1 tablet at 07/08/22 0900      REVIEW OF SYSTEMS:  Patient seen and examined at bedside  Review of Systems   Constitutional: Positive for activity change  HENT: Negative  Eyes: Negative  Respiratory: Negative  Cardiovascular: Negative  Gastrointestinal: Positive for abdominal pain  Incisional   Genitourinary: Negative  Musculoskeletal: Positive for joint swelling  Having bilateral knee pain   Skin: Positive for wound  Neurological: Negative  Hematological: Negative  Psychiatric/Behavioral: Negative  PHYSICAL EXAM:  Current weight: Weight - Scale: (!) 225 kg (495 lb 9 5 oz)  Vitals:    07/08/22 0551 07/08/22 0713 07/08/22 0814 07/08/22 1104   BP:   128/60 94/62   Pulse:   92 86   Resp:   16    Temp:   98 3 °F (36 8 °C) 99 6 °F (37 6 °C)   TempSrc:       SpO2:  95% 98% 96%   Weight: (!) 225 kg (495 lb 9 5 oz)      Height:           Physical Exam  Vitals and nursing note reviewed  Constitutional:       Appearance: Normal appearance  He is obese  HENT:      Head: Normocephalic and atraumatic  Nose: Nose normal       Mouth/Throat:      Mouth: Mucous membranes are dry  Pharynx: Oropharynx is clear  Eyes:      Extraocular Movements: Extraocular movements intact  Conjunctiva/sclera: Conjunctivae normal    Cardiovascular:      Rate and Rhythm: Normal rate and regular rhythm  Pulses: Normal pulses  Heart sounds: Normal heart sounds  Pulmonary:      Effort: Pulmonary effort is normal       Breath sounds: Normal breath sounds  Comments: Decreased throughout  Abdominal:      General: Bowel sounds are normal       Palpations: Abdomen is soft  Comments: Abdominal wound VAC present   Musculoskeletal:         General: Normal range of motion  Cervical back: Normal range of motion and neck supple  Comments: Difficult to assess with body habitus   Skin:     General: Skin is warm and dry  Comments: Left upper leg dressing intact   Neurological:      General: No focal deficit present        Mental Status: He is oriented to person, place, and time  Psychiatric:         Mood and Affect: Mood normal          Behavior: Behavior normal               Lab Results:   Results from last 7 days   Lab Units 07/08/22  0530 07/07/22  1534 07/07/22  0558 07/06/22  0250 07/06/22  0245 07/05/22  0321   WBC Thousand/uL 5 27  --  5 55 4 83  --  4 77   HEMOGLOBIN g/dL 8 0*  --  7 9* 7 9*  --  8 6*   HEMATOCRIT % 25 7*  --  26 5* 26 4*  --  28 2*   PLATELETS Thousands/uL 233  --  219 207  --  235   SODIUM mmol/L 129* 127* 127*  --    < > 130*   POTASSIUM mmol/L 4 0 3 8 4 1  --    < > 3 6   CHLORIDE mmol/L 91* 90* 90*  --    < > 90*   CO2 mmol/L 35* 33* 35*  --    < > 38*   BUN mg/dL 11 11 13  --    < > 18   CREATININE mg/dL 0 58* 0 59* 0 60  --    < > 0 64   CALCIUM mg/dL 9 1 9 0 9 0  --    < > 8 6   MAGNESIUM mg/dL 2 3  --  2 1  --   --  2 1   PHOSPHORUS mg/dL 3 1  --  3 1  --   --  3 2   ALK PHOS U/L 161*  --   --   --   --   --    ALT U/L 20  --   --   --   --   --    AST U/L 19  --   --   --   --   --     < > = values in this interval not displayed         Other Studies:

## 2022-07-09 LAB
ANION GAP SERPL CALCULATED.3IONS-SCNC: 4 MMOL/L (ref 4–13)
APTT PPP: 76 SECONDS (ref 23–37)
BASOPHILS # BLD AUTO: 0.02 THOUSANDS/ΜL (ref 0–0.1)
BASOPHILS NFR BLD AUTO: 0 % (ref 0–1)
BUN SERPL-MCNC: 14 MG/DL (ref 5–25)
CALCIUM SERPL-MCNC: 8.8 MG/DL (ref 8.3–10.1)
CHLORIDE SERPL-SCNC: 92 MMOL/L (ref 100–108)
CO2 SERPL-SCNC: 34 MMOL/L (ref 21–32)
CREAT SERPL-MCNC: 0.68 MG/DL (ref 0.6–1.3)
EOSINOPHIL # BLD AUTO: 0.1 THOUSAND/ΜL (ref 0–0.61)
EOSINOPHIL NFR BLD AUTO: 2 % (ref 0–6)
ERYTHROCYTE [DISTWIDTH] IN BLOOD BY AUTOMATED COUNT: 16.2 % (ref 11.6–15.1)
GFR SERPL CREATININE-BSD FRML MDRD: 110 ML/MIN/1.73SQ M
GLUCOSE SERPL-MCNC: 112 MG/DL (ref 65–140)
GLUCOSE SERPL-MCNC: 113 MG/DL (ref 65–140)
GLUCOSE SERPL-MCNC: 123 MG/DL (ref 65–140)
GLUCOSE SERPL-MCNC: 123 MG/DL (ref 65–140)
GLUCOSE SERPL-MCNC: 131 MG/DL (ref 65–140)
GLUCOSE SERPL-MCNC: 131 MG/DL (ref 65–140)
HCT VFR BLD AUTO: 24.9 % (ref 36.5–49.3)
HGB BLD-MCNC: 7.7 G/DL (ref 12–17)
IMM GRANULOCYTES # BLD AUTO: 0.12 THOUSAND/UL (ref 0–0.2)
IMM GRANULOCYTES NFR BLD AUTO: 2 % (ref 0–2)
LYMPHOCYTES # BLD AUTO: 1.9 THOUSANDS/ΜL (ref 0.6–4.47)
LYMPHOCYTES NFR BLD AUTO: 31 % (ref 14–44)
MCH RBC QN AUTO: 28.7 PG (ref 26.8–34.3)
MCHC RBC AUTO-ENTMCNC: 30.9 G/DL (ref 31.4–37.4)
MCV RBC AUTO: 93 FL (ref 82–98)
MONOCYTES # BLD AUTO: 0.95 THOUSAND/ΜL (ref 0.17–1.22)
MONOCYTES NFR BLD AUTO: 16 % (ref 4–12)
NEUTROPHILS # BLD AUTO: 3 THOUSANDS/ΜL (ref 1.85–7.62)
NEUTS SEG NFR BLD AUTO: 49 % (ref 43–75)
NRBC BLD AUTO-RTO: 1 /100 WBCS
PLATELET # BLD AUTO: 224 THOUSANDS/UL (ref 149–390)
PMV BLD AUTO: 10.8 FL (ref 8.9–12.7)
POTASSIUM SERPL-SCNC: 4 MMOL/L (ref 3.5–5.3)
RBC # BLD AUTO: 2.68 MILLION/UL (ref 3.88–5.62)
SODIUM SERPL-SCNC: 130 MMOL/L (ref 136–145)
WBC # BLD AUTO: 6.09 THOUSAND/UL (ref 4.31–10.16)

## 2022-07-09 PROCEDURE — 94760 N-INVAS EAR/PLS OXIMETRY 1: CPT

## 2022-07-09 PROCEDURE — 82948 REAGENT STRIP/BLOOD GLUCOSE: CPT

## 2022-07-09 PROCEDURE — 85730 THROMBOPLASTIN TIME PARTIAL: CPT | Performed by: SURGERY

## 2022-07-09 PROCEDURE — 85025 COMPLETE CBC W/AUTO DIFF WBC: CPT | Performed by: PHYSICIAN ASSISTANT

## 2022-07-09 PROCEDURE — 94640 AIRWAY INHALATION TREATMENT: CPT

## 2022-07-09 PROCEDURE — 99232 SBSQ HOSP IP/OBS MODERATE 35: CPT | Performed by: INTERNAL MEDICINE

## 2022-07-09 PROCEDURE — 80048 BASIC METABOLIC PNL TOTAL CA: CPT | Performed by: NURSE PRACTITIONER

## 2022-07-09 PROCEDURE — 99024 POSTOP FOLLOW-UP VISIT: CPT | Performed by: SURGERY

## 2022-07-09 PROCEDURE — 94660 CPAP INITIATION&MGMT: CPT

## 2022-07-09 RX ORDER — SODIUM CHLORIDE 1000 MG
1 TABLET, SOLUBLE MISCELLANEOUS 3 TIMES DAILY
Status: DISCONTINUED | OUTPATIENT
Start: 2022-07-09 | End: 2022-07-10

## 2022-07-09 RX ADMIN — DIGOXIN 250 MCG: 250 TABLET ORAL at 09:21

## 2022-07-09 RX ADMIN — GABAPENTIN 300 MG: 250 SOLUTION ORAL at 21:19

## 2022-07-09 RX ADMIN — LEVALBUTEROL HYDROCHLORIDE 1.25 MG: 1.25 SOLUTION, CONCENTRATE RESPIRATORY (INHALATION) at 13:46

## 2022-07-09 RX ADMIN — SODIUM CHLORIDE TAB 1 GM 1 G: 1 TAB at 14:00

## 2022-07-09 RX ADMIN — METOPROLOL TARTRATE 25 MG: 25 TABLET, FILM COATED ORAL at 23:59

## 2022-07-09 RX ADMIN — HEPARIN SODIUM 26 UNITS/KG/HR: 10000 INJECTION, SOLUTION INTRAVENOUS at 04:35

## 2022-07-09 RX ADMIN — METOCLOPRAMIDE HYDROCHLORIDE 10 MG: 5 INJECTION INTRAMUSCULAR; INTRAVENOUS at 06:07

## 2022-07-09 RX ADMIN — IPRATROPIUM BROMIDE 0.5 MG: 0.5 SOLUTION RESPIRATORY (INHALATION) at 13:47

## 2022-07-09 RX ADMIN — LEVALBUTEROL HYDROCHLORIDE 1.25 MG: 1.25 SOLUTION, CONCENTRATE RESPIRATORY (INHALATION) at 19:06

## 2022-07-09 RX ADMIN — METHOCARBAMOL TABLETS 750 MG: 750 TABLET, COATED ORAL at 17:49

## 2022-07-09 RX ADMIN — BUDESONIDE AND FORMOTEROL FUMARATE DIHYDRATE 2 PUFF: 160; 4.5 AEROSOL RESPIRATORY (INHALATION) at 19:31

## 2022-07-09 RX ADMIN — METHOCARBAMOL TABLETS 750 MG: 750 TABLET, COATED ORAL at 06:07

## 2022-07-09 RX ADMIN — METOPROLOL TARTRATE 25 MG: 25 TABLET, FILM COATED ORAL at 17:50

## 2022-07-09 RX ADMIN — ACETAMINOPHEN 975 MG: 325 TABLET, FILM COATED ORAL at 21:17

## 2022-07-09 RX ADMIN — OXYCODONE HYDROCHLORIDE 10 MG: 5 SOLUTION ORAL at 06:07

## 2022-07-09 RX ADMIN — METHOCARBAMOL TABLETS 750 MG: 750 TABLET, COATED ORAL at 23:59

## 2022-07-09 RX ADMIN — METHOCARBAMOL TABLETS 750 MG: 750 TABLET, COATED ORAL at 12:08

## 2022-07-09 RX ADMIN — METOPROLOL TARTRATE 25 MG: 25 TABLET, FILM COATED ORAL at 09:22

## 2022-07-09 RX ADMIN — GABAPENTIN 300 MG: 250 SOLUTION ORAL at 09:21

## 2022-07-09 RX ADMIN — QUETIAPINE FUMARATE 50 MG: 25 TABLET ORAL at 21:17

## 2022-07-09 RX ADMIN — PANTOPRAZOLE SODIUM 20 MG: 20 TABLET, DELAYED RELEASE ORAL at 06:08

## 2022-07-09 RX ADMIN — SENNOSIDES AND DOCUSATE SODIUM 1 TABLET: 8.6; 5 TABLET ORAL at 09:22

## 2022-07-09 RX ADMIN — HEPARIN SODIUM 26 UNITS/KG/HR: 10000 INJECTION, SOLUTION INTRAVENOUS at 21:17

## 2022-07-09 RX ADMIN — GABAPENTIN 300 MG: 250 SOLUTION ORAL at 17:49

## 2022-07-09 RX ADMIN — METHOCARBAMOL TABLETS 750 MG: 750 TABLET, COATED ORAL at 00:21

## 2022-07-09 RX ADMIN — OXYCODONE HYDROCHLORIDE 5 MG: 5 SOLUTION ORAL at 12:08

## 2022-07-09 RX ADMIN — BISACODYL 10 MG: 10 SUPPOSITORY RECTAL at 11:54

## 2022-07-09 RX ADMIN — LEVOTHYROXINE SODIUM 25 MCG: 25 TABLET ORAL at 06:08

## 2022-07-09 RX ADMIN — BUDESONIDE AND FORMOTEROL FUMARATE DIHYDRATE 2 PUFF: 160; 4.5 AEROSOL RESPIRATORY (INHALATION) at 09:22

## 2022-07-09 RX ADMIN — METOPROLOL TARTRATE 25 MG: 25 TABLET, FILM COATED ORAL at 00:21

## 2022-07-09 RX ADMIN — POLYETHYLENE GLYCOL 3350 17 G: 17 POWDER, FOR SOLUTION ORAL at 09:21

## 2022-07-09 RX ADMIN — METOCLOPRAMIDE HYDROCHLORIDE 10 MG: 5 INJECTION INTRAMUSCULAR; INTRAVENOUS at 12:08

## 2022-07-09 RX ADMIN — SENNOSIDES AND DOCUSATE SODIUM 1 TABLET: 8.6; 5 TABLET ORAL at 17:49

## 2022-07-09 RX ADMIN — ACETAMINOPHEN 975 MG: 325 TABLET, FILM COATED ORAL at 14:00

## 2022-07-09 RX ADMIN — OXYCODONE HYDROCHLORIDE 10 MG: 5 SOLUTION ORAL at 17:49

## 2022-07-09 RX ADMIN — METOCLOPRAMIDE HYDROCHLORIDE 10 MG: 5 INJECTION INTRAMUSCULAR; INTRAVENOUS at 23:59

## 2022-07-09 RX ADMIN — IPRATROPIUM BROMIDE 0.5 MG: 0.5 SOLUTION RESPIRATORY (INHALATION) at 19:06

## 2022-07-09 RX ADMIN — METOCLOPRAMIDE HYDROCHLORIDE 10 MG: 5 INJECTION INTRAMUSCULAR; INTRAVENOUS at 17:49

## 2022-07-09 RX ADMIN — ACETAMINOPHEN 975 MG: 325 TABLET, FILM COATED ORAL at 06:08

## 2022-07-09 RX ADMIN — HEPARIN SODIUM 26 UNITS/KG/HR: 10000 INJECTION, SOLUTION INTRAVENOUS at 14:00

## 2022-07-09 RX ADMIN — METOCLOPRAMIDE HYDROCHLORIDE 10 MG: 5 INJECTION INTRAMUSCULAR; INTRAVENOUS at 00:21

## 2022-07-09 RX ADMIN — SODIUM CHLORIDE TAB 1 GM 1 G: 1 TAB at 17:49

## 2022-07-09 RX ADMIN — SODIUM CHLORIDE TAB 1 GM 1 G: 1 TAB at 21:17

## 2022-07-09 NOTE — PROGRESS NOTES
NEPHROLOGY PROGRESS NOTE   Devonte Hester 47 y o  male MRN: [id number]  Unit/Bed#: [facility] 607-01 Encounter: [id number]        ASSESSMENT & PLAN    1  Hyponatremia likely secondary to volume depletion setting of diuretic use sodium decreased to 129   · Continue to hold diuretics  · Start salt tablets 1 g t i d  For now  · Repeat BMP tomorrow  · Serum osmolality mildly low  · Urine osmolality 297  · Urine sodium low  · Not tolerating much p o  Intake    2  Hypotension-blood pressures on the lower side 90/60 and will discontinue spironolactone and torsemide Will hold for now and monitor, maintained on metoprolol 25 mg every 8 hours     3  Small-bowel obstruction with ventral hernia-surgery following wound VAC in place     SUBJECTIVE:    Patient was seen today resting comfortably  Not eating much per nurse  No acute events overnight blood pressures remain unchanged    12 point review of systems was otherwise negative besides what is mentioned above      Medications:    Current Facility-Administered Medications:     acetaminophen (TYLENOL) tablet 975 mg, 975 mg, Oral, Q8H [facility], Nicolas Strong DO, 975 mg at 07/09/22 0608    benzocaine (HURRICAINE) 20 % mucosal spray 2 spray, 2 spray, Mucosal, Once, Nicolas Strong DO    bisacodyl (DULCOLAX) rectal suppository 10 mg, 10 mg, Rectal, Daily, Nicolas Strong DO, 10 mg at 07/09/22 1154    budesonide-formoterol (SYMBICORT) 160-4 5 mcg/act inhaler 2 puff, 2 puff, Inhalation, BID, Nicolas Strong DO, 2 puff at 07/09/22 7380    digoxin (LANOXIN) tablet 250 mcg, 250 mcg, Oral, Daily, Garland Oliver MD, 250 mcg at 07/09/22 9680    gabapentin (NEURONTIN) oral solution 300 mg, 300 mg, Oral, TID, Nicolas Strong DO, 300 mg at 07/09/22 9358    heparin (porcine) 25,000 units in 0 45% NaCl 250 mL infusion (premix), 3-30 Units/kg/hr (Order-Specific), Intravenous, Titrated, Valenzuela Cocks Allsbrook, DO, Last Rate: 32 5 mL/hr at 07/09/22 0435, 26 Units/kg/hr at 07/09/22 0435    heparin (porcine) injection 10,000 Units, 10,000 Units, Intravenous, Q1H PRN, Rachael Alcazar DO, 10,000 Units at 06/18/22 1803    heparin (porcine) injection 5,000 Units, 5,000 Units, Intravenous, Q1H PRN, Rachael Alcazar DO, 5,000 Units at 07/08/22 1245    HYDROmorphone (DILAUDID) injection 1 mg, 1 mg, Intravenous, Q6H PRN, Rachael Alcazar DO, 1 mg at 07/08/22 2043    insulin lispro (HumaLOG) 100 units/mL subcutaneous injection 1-5 Units, 1-5 Units, Subcutaneous, Q6H [address], 1 Units at 07/08/22 1751 **AND** Fingerstick Glucose (POCT), , , Q6H, Enoc Strong DO    ipratropium (ATROVENT) 0 02 % inhalation solution 0 5 mg, 0 5 mg, Nebulization, TID, Enoc Strong DO, 0 5 mg at 07/08/22 1941    levalbuterol (Cletis Clack) inhalation solution 1 25 mg, 1 25 mg, Nebulization, TID, Enoc Strong, DO, 1 25 mg at 07/08/22 1941    levothyroxine tablet 25 mcg, 25 mcg, Oral, Early Morning, Enoc Strong DO, 25 mcg at 07/09/22 0608    lidocaine (URO-JET) 2 % urethral/mucosal gel 1 application, 1 application, Urethral, Once, Rachael Alcazar DO    methocarbamol (ROBAXIN) tablet 750 mg, 750 mg, Oral, Q6H [address], Enoc Strong, DO, 750 mg at 07/09/22 1208    metoclopramide (REGLAN) injection 10 mg, 10 mg, Intravenous, Q6H [address], Enoc Strong, DO, 10 mg at 07/09/22 1208    metoprolol tartrate (LOPRESSOR) tablet 25 mg, 25 mg, Oral, Q8H, Rach Yin MD, 25 mg at 07/09/22 0922    ondansetron (ZOFRAN) injection 4 mg, 4 mg, Intravenous, Q4H PRN, Enoc Strong, DO, 4 mg at 07/03/22 0439    oxyCODONE (ROXICODONE) oral solution 10 mg, 10 mg, Oral, Q4H PRN, Enoc Strong, DO, 10 mg at 07/09/22 0607    oxyCODONE (ROXICODONE) oral solution 5 mg, 5 mg, Oral, Q4H PRN, Enoc Strong, DO, 5 mg at 07/09/22 1208    pantoprazole (PROTONIX) EC tablet 20 mg, 20 mg, Oral, Early Morning, Enoc Strong, DO, 20 mg at 07/09/22 0608    polyethylene glycol (MIRALAX) packet 17 g, 17 g, Oral, Daily, Nicolas Heatonbrook, DO, 17 g at 07/09/22 5801    QUEtiapine (SEROquel) tablet 50 mg, 50 mg, Oral, HS, Nicolas Parkinson Allsbrook, DO, 50 mg at 07/08/22 2120    senna-docusate sodium (SENOKOT S) 8 6-50 mg per tablet 1 tablet, 1 tablet, Oral, BID, Nicolas Talaveras Allsbrook, DO, 1 tablet at 07/09/22 4439    sodium chloride tablet 1 g, 1 g, Oral, TID, Yajaira Jenkins DO    OBJECTIVE:    Vitals:    07/09/22 0741 07/09/22 0921 07/09/22 0922 07/09/22 1203   BP: 108/69  105/68 (!) 105/46   Pulse: 87 65 65 86   Resp: 16   18   Temp: 100 2 °F (37 9 °C)   99 7 °F (37 6 °C)   TempSrc:       SpO2: 95%   (!) 82%   Weight:       Height:            Temp:  [98 5 °F (36 9 °C)-100 3 °F (37 9 °C)] 99 7 °F (37 6 °C)  HR:  [65-94] 86  Resp:  [16-18] 18  BP: ()/(46-69) 105/46  SpO2:  [82 %-98 %] 82 %     Body mass index is 69 12 kg/m²  Weight (last 2 days)     Date/Time Weight    07/08/22 0551 225 (495 59)          I/O last 3 completed shifts:  In: -   Out: 950 [Urine:350; Drains:600]    No intake/output data recorded        Physical exam:    General: no acute distress, cooperative, elevated BMI  Eyes: conjunctivae pink, anicteric sclerae  ENT: lips and mucous membranes moist, no exudates, normal external ears  Neck: ROM intact, no JVD  Chest: No respiratory distress, no accessory muscle use  CVS: normal rate, non pericardial friction rub  Abdomen: soft, non-tender, non-distended, normoactive bowel sounds  Extremities: no edema of both legs  Skin: no rash  Neuro: awake, alert, oriented, grossly intact  Psych:  Pleasant affect    Invasive Devices:      Lab Results:   Results from last 7 days   Lab Units 07/09/22  0626 07/08/22  0530 07/07/22  1534 07/07/22  0558 07/06/22  0250 07/06/22  0245 07/05/22  0321 07/04/22  0612 07/03/22  1246 07/03/22  0505   WBC Thousand/uL 6 09 5 27  --  5 55 4 83  --  4 77  --    < > 6 86   HEMOGLOBIN g/dL 7 7* 8 0*  --  7 9* 7 9*  --  8 6*  --    < > 9 2* HEMATOCRIT % 24 9* 25 7*  --  26 5* 26 4*  --  28 2*  --    < > 29 4*   PLATELETS Thousands/uL 224 233  --  219 207  --  235  --    < > 246   POTASSIUM mmol/L 4 0 4 0 3 8 4 1  --  4 0 3 6 3 7   < > 3 3*   CHLORIDE mmol/L 92* 91* 90* 90*  --  91* 90* 92*   < > 91*   CO2 mmol/L 34* 35* 33* 35*  --  33* 38* 37*   < > 36*   BUN mg/dL 14 11 11 13  --  19 18 23   < > 25   CREATININE mg/dL 0 68 0 58* 0 59* 0 60  --  0 59* 0 64 0 71   < > 0 72   CALCIUM mg/dL 8 8 9 1 9 0 9 0  --  8 7 8 6 9 0   < > 9 1   MAGNESIUM mg/dL  --  2 3  --  2 1  --   --  2 1 2 3  --  2 4   PHOSPHORUS mg/dL  --  3 1  --  3 1  --   --  3 2 3 9  --  4 0   ALK PHOS U/L  --  161*  --   --   --   --   --   --   --   --    ALT U/L  --  20  --   --   --   --   --   --   --   --    AST U/L  --  19  --   --   --   --   --   --   --   --     < > = values in this interval not displayed  Portions of the record may have been created with voice recognition software  Occasional wrong word or "sound a like" substitutions may have occurred due to the inherent limitations of voice recognition software  Read the chart carefully and recognize, using context, where substitutions have occurred  If you have any questions, please contact the dictating provider

## 2022-07-09 NOTE — PROGRESS NOTES
Progress Note - Gato Wu 47 y o  male MRN: [id number]    Unit/Bed#: [facility] 607-01 Encounter: [id number]      Assessment:  Patient is a 50-year-old male with complex PMHx who presented w a SBO and ventral hernia now postop day 9 from an attempted STSG, I&D, washout and VAC placement on 06/30 and now postop day 4 from a 2nd STSG on 07/05  Plan:  - keep wound vacs on continuous suction 125 mmHg (for both donor and STSG sites) for a total of 5 days postop (takedown 7/10)  - general surgery to continue daily dressing changes a small puncture wound adjacent AST SG  - continue to monitor hemoglobin and WBC  - appreciate nephrology recommendations on hyponatremia - likely volume depletion  Held diuretics, gave 1 L NS, q8 BMPs to monitor Na, call nephro for Na < 129  - continue to monitor wound VAC outputs  - continue multimodal pain and nausea control p r n   - please tiger text on-call surgery resident with questions/concerns    Subjective:   No acute events overnight  Vital signs stable  Uses 2 L oxygen overnight via nasal cannula routinely  Continues to tolerate carb controlled diet well without an/V/D  Pain is well controlled on current multimodal regimen  Voiding bladder spontaneously  Having regular bowel movements  Two wound vacs continue to be in place  Both are C/D/I and without air leak set to continuous suction at 125 mmHg left 150 cc of output in the last 12 hours and 250 cc of output total overall in the past 24 hours  Drainage is serosanguineous in nature  Patient feels well overall  Reports no subjective fevers or chills  Denies headache, chest pain, shortness of breath, nausea, vomiting, and diarrhea  Objective:     Vitals: Blood pressure 104/68, pulse 86, temperature 100 3 °F (37 9 °C), resp  rate 16, height 5' 11" (1 803 m), weight (!) 225 kg (495 lb 9 5 oz), SpO2 98 %  ,Body mass index is 69 12 kg/m²        Intake/Output Summary (Last 24 hours) at 7/9/2022 0581  Last data filed at 7/9/2022 0042  Gross per 24 hour   Intake --   Output 250 ml   Net -250 ml       Physical Exam:  Gen: patient well appearing, no acute distress, resting comfortably in bed  CV: warm, well perfused  Lungs: normal work of breathing, no respiratory distress, using 2L NC  Abd: soft, non-distended, wound vac in place in anterior lower midline c/d/i without air leaks set to continuous suction at 125 mmHg w serosanguinous output  Neuro: alert and oriented, MS grossly intact  MSK: moves all extremities normally    Invasive Devices  Report    Peripherally Inserted Central Catheter Line  Duration           PICC Line 48/15/56 Right Basilic 25 days                Lab, Imaging and other studies: I have personally reviewed pertinent reports      VTE Pharmacologic Prophylaxis: Heparin  VTE Mechanical Prophylaxis: sequential compression device     Amanda Franz MD  PGY1 Orthopedic Surgery

## 2022-07-09 NOTE — PLAN OF CARE
Problem: Prexisting or High Potential for Compromised Skin Integrity  Goal: Skin integrity is maintained or improved  Description: INTERVENTIONS:  - Identify patients at risk for skin breakdown  - Assess and monitor skin integrity  - Assess and monitor nutrition and hydration status  - Monitor labs   - Assess for incontinence   - Turn and reposition patient  - Assist with mobility/ambulation  - Relieve pressure over bony prominences  - Avoid friction and shearing  - Provide appropriate hygiene as needed including keeping skin clean and dry  - Evaluate need for skin moisturizer/barrier cream  - Collaborate with interdisciplinary team   - Patient/family teaching  - Consider wound care consult   Outcome: Progressing     Problem: Potential for Falls  Goal: Patient will remain free of falls  Description: INTERVENTIONS:  - Educate patient/family on patient safety including physical limitations  - Instruct patient to call for assistance with activity   - Consult OT/PT to assist with strengthening/mobility   - Keep Call bell within reach  - Keep bed low and locked with side rails adjusted as appropriate  - Keep care items and personal belongings within reach  - Initiate and maintain comfort rounds  - Make Fall Risk Sign visible to staff  - Offer Toileting every 1 Hours, in advance of need  - Initiate/Maintain alarm  - Obtain necessary fall risk management equipment  - Apply yellow socks and bracelet for high fall risk patients  - Consider moving patient to room near nurses station  Outcome: Progressing     Problem: MOBILITY - ADULT  Goal: Maintain or return to baseline ADL function  Description: INTERVENTIONS:  -  Assess patient's ability to carry out ADLs; assess patient's baseline for ADL function and identify physical deficits which impact ability to perform ADLs (bathing, care of mouth/teeth, toileting, grooming, dressing, etc )  - Assess/evaluate cause of self-care deficits   - Assess range of motion  - Assess patient's mobility; develop plan if impaired  - Assess patient's need for assistive devices and provide as appropriate  - Encourage maximum independence but intervene and supervise when necessary  - Involve family in performance of ADLs  - Assess for home care needs following discharge   - Consider OT consult to assist with ADL evaluation and planning for discharge  - Provide patient education as appropriate  Outcome: Progressing  Goal: Maintains/Returns to pre admission functional level  Description: INTERVENTIONS:  - Perform BMAT or MOVE assessment daily    - Set and communicate daily mobility goal to care team and patient/family/caregiver  - Collaborate with rehabilitation services on mobility goals if consulted  - Perform Range of Motion 3 times a day  - Reposition patient every 2 hours  - Dangle patient 3 times a day  - Stand patient 3 times a day  - Ambulate patient 3 times a day  - Out of bed to chair 3 times a day   - Out of bed for meals 3 times a day  - Out of bed for toileting  - Record patient progress and toleration of activity level   Outcome: Progressing     Problem: Nutrition/Hydration-ADULT  Goal: Nutrient/Hydration intake appropriate for improving, restoring or maintaining nutritional needs  Description: Monitor and assess patient's nutrition/hydration status for malnutrition  Collaborate with interdisciplinary team and initiate plan and interventions as ordered  Monitor patient's weight and dietary intake as ordered or per policy  Utilize nutrition screening tool and intervene as necessary  Determine patient's food preferences and provide high-protein, high-caloric foods as appropriate       INTERVENTIONS:  - Monitor oral intake, urinary output, labs, and treatment plans  - Assess nutrition and hydration status and recommend course of action  - Evaluate amount of meals eaten  - Assist patient with eating if necessary   - Allow adequate time for meals  - Recommend/ encourage appropriate diets, oral nutritional supplements, and vitamin/mineral supplements  - Order, calculate, and assess calorie counts as needed  - Recommend, monitor, and adjust tube feedings and TPN/PPN based on assessed needs  - Assess need for intravenous fluids  - Provide specific nutrition/hydration education as appropriate  - Include patient/family/caregiver in decisions related to nutrition  Outcome: Progressing     Problem: PAIN - ADULT  Goal: Verbalizes/displays adequate comfort level or baseline comfort level  Description: Interventions:  - Encourage patient to monitor pain and request assistance  - Assess pain using appropriate pain scale  - Administer analgesics based on type and severity of pain and evaluate response  - Implement non-pharmacological measures as appropriate and evaluate response  - Consider cultural and social influences on pain and pain management  - Notify physician/advanced practitioner if interventions unsuccessful or patient reports new pain  Outcome: Progressing     Problem: INFECTION - ADULT  Goal: Absence or prevention of progression during hospitalization  Description: INTERVENTIONS:  - Assess and monitor for signs and symptoms of infection  - Monitor lab/diagnostic results  - Monitor all insertion sites, i e  indwelling lines, tubes, and drains  - Monitor endotracheal if appropriate and nasal secretions for changes in amount and color  - [place] appropriate cooling/warming therapies per order  - Administer medications as ordered  - Instruct and encourage patient and family to use good hand hygiene technique  - Identify and instruct in appropriate isolation precautions for identified infection/condition  Outcome: Progressing     Problem: DISCHARGE PLANNING  Goal: Discharge to home or other facility with appropriate resources  Description: INTERVENTIONS:  - Identify barriers to discharge w/patient and caregiver  - Arrange for needed discharge resources and transportation as appropriate  - Identify discharge learning needs (meds, wound care, etc )  - Arrange for interpretive services to assist at discharge as needed  - Refer to Case Management Department for coordinating discharge planning if the patient needs post-hospital services based on physician/advanced practitioner order or complex needs related to functional status, cognitive ability, or social support system  Outcome: Progressing     Problem: Knowledge Deficit  Goal: Patient/family/caregiver demonstrates understanding of disease process, treatment plan, medications, and discharge instructions  Description: Complete learning assessment and assess knowledge base    Interventions:  - Provide teaching at level of understanding  - Provide teaching via preferred learning methods  Outcome: Progressing     Problem: CARDIOVASCULAR - ADULT  Goal: Maintains optimal cardiac output and hemodynamic stability  Description: INTERVENTIONS:  - Monitor I/O, vital signs and rhythm  - Monitor for S/S and trends of decreased cardiac output  - Administer and titrate ordered vasoactive medications to optimize hemodynamic stability  - Assess quality of pulses, skin color and temperature  - Assess for signs of decreased coronary artery perfusion  - Instruct patient to report change in severity of symptoms  Outcome: Progressing  Goal: Absence of cardiac dysrhythmias or at baseline rhythm  Description: INTERVENTIONS:  - Continuous cardiac monitoring, vital signs, obtain 12 lead EKG if ordered  - Administer antiarrhythmic and heart rate control medications as ordered  - Monitor electrolytes and administer replacement therapy as ordered  Outcome: Progressing     Problem: RESPIRATORY - ADULT  Goal: Achieves optimal ventilation and oxygenation  Description: INTERVENTIONS:  - Assess for changes in respiratory status  - Assess for changes in mentation and behavior  - Position to facilitate oxygenation and minimize respiratory effort  - Oxygen administered by appropriate delivery if ordered  - Initiate smoking cessation education as indicated  - Encourage broncho-pulmonary hygiene including cough, deep breathe, Incentive Spirometry  - Assess the need for suctioning and aspirate as needed  - Assess and instruct to report SOB or any respiratory difficulty  - Respiratory Therapy support as indicated  Outcome: Progressing     Problem: GASTROINTESTINAL - ADULT  Goal: Minimal or absence of nausea and/or vomiting  Description: INTERVENTIONS:  - Administer IV fluids if ordered to ensure adequate hydration  - Maintain NPO status until nausea and vomiting are resolved  - Nasogastric tube if ordered  - Administer ordered antiemetic medications as needed  - Provide nonpharmacologic comfort measures as appropriate  - Advance diet as tolerated, if ordered  - Consider nutrition services referral to assist patient with adequate nutrition and appropriate food choices  Outcome: Progressing  Goal: Maintains or returns to baseline bowel function  Description: INTERVENTIONS:  - Assess bowel function  - Encourage oral fluids to ensure adequate hydration  - Administer IV fluids if ordered to ensure adequate hydration  - Administer ordered medications as needed  - Encourage mobilization and activity  - Consider nutritional services referral to assist patient with adequate nutrition and appropriate food choices  Outcome: Progressing  Goal: Maintains adequate nutritional intake  Description: INTERVENTIONS:  - Monitor percentage of each meal consumed  - Identify factors contributing to decreased intake, treat as appropriate  - Assist with meals as needed  - Monitor I&O, weight, and lab values if indicated  - Obtain nutrition services referral as needed  Outcome: Progressing  Goal: Establish and maintain optimal ostomy function  Description: INTERVENTIONS:  - Assess bowel function  - Encourage oral fluids to ensure adequate hydration  - Administer IV fluids if ordered to ensure adequate hydration   - Administer ordered medications as needed  - Encourage mobilization and activity  - Nutrition services referral to assist patient with appropriate food choices  - Assess stoma site  - Consider wound care consult   Outcome: Progressing  Goal: Oral mucous membranes remain intact  Description: INTERVENTIONS  - Assess oral mucosa and hygiene practices  - Implement preventative oral hygiene regimen  - Implement oral medicated treatments as ordered  - Initiate Nutrition services referral as needed  Outcome: Progressing     Problem: GENITOURINARY - ADULT  Goal: Maintains or returns to baseline urinary function  Description: INTERVENTIONS:  - Assess urinary function  - Encourage oral fluids to ensure adequate hydration if ordered  - Administer IV fluids as ordered to ensure adequate hydration  - Administer ordered medications as needed  - Offer frequent toileting  - Follow urinary retention protocol if ordered  Outcome: Progressing  Goal: Absence of urinary retention  Description: INTERVENTIONS:  - Assess patients ability to void and empty bladder  - Monitor I/O  - Bladder scan as needed  - Discuss with physician/AP medications to alleviate retention as needed  - Discuss catheterization for long term situations as appropriate  Outcome: Progressing  Goal: Urinary catheter remains patent  Description: INTERVENTIONS:  - Assess patency of urinary catheter  - If patient has a chronic carmen, consider changing catheter if non-functioning  - Follow guidelines for intermittent irrigation of non-functioning urinary catheter  Outcome: Progressing     Problem: SKIN/TISSUE INTEGRITY - ADULT  Goal: Skin Integrity remains intact(Skin Breakdown Prevention)  Description: Assess:  -Perform [place] assessment every shift  -Clean and moisturize skin every day  -Inspect skin when repositioning, toileting, and assisting with ADLS  -Assess extremities for adequate circulation and sensation     Bed Management:  -Have minimal linens on bed & keep smooth, unwrinkled  -Change linens as needed when moist or perspiring  -Avoid sitting or lying in one position for more than 2 hours while in bed  -Keep HOB at [address]:  -Offer bedside commode  -Assess for incontinence every   -Use incontinent care products after each incontinent episode such as foam    Activity:  -Mobilize patient 3 times a day  -Encourage activity and walks on unit  -Encourage or provide ROM exercises   -Turn and reposition patient every 2 Hours  -Use appropriate equipment to lift or move patient in bed  -Instruct/ Assist with weight shifting every 2 when out of bed in chair  -Consider limitation of chair time 1 hour intervals    Skin Care:  -Avoid use of baby powder, tape, friction and shearing, hot water or constrictive clothing  -Relieve pressure over bony prominences using wedges  -Do not massage red bony areas      Outcome: Progressing  Goal: Incision(s), wounds(s) or drain site(s) healing without S/S of infection  Description: INTERVENTIONS  - Assess and document dressing, incision, wound bed, drain sites and surrounding tissue  - Provide patient and family education  - Perform skin care/dressing changes every day  Outcome: Progressing  Goal: Pressure injury heals and does not worsen  Description: Interventions:  - Implement low air loss mattress or specialty surface (Criteria met)  - Apply silicone foam dressing  - Instruct/assist with weight shifting every 30 minutes when in chair   - Limit chair time to 1 hour intervals  - Use special pressure reducing interventions such as waffle when in chair   - Apply fecal or urinary incontinence containment device   - Perform passive or active ROM every day  - Turn and reposition patient & offload bony prominences every 2 hours   - Utilize friction reducing device or surface for transfers   - Consider consults to  interdisciplinary teams such as   - Use incontinent care products after each incontinent episode such as   - Consider nutrition services referral as needed  Outcome: Progressing

## 2022-07-10 LAB
ANION GAP SERPL CALCULATED.3IONS-SCNC: 2 MMOL/L (ref 4–13)
APTT PPP: 64 SECONDS (ref 23–37)
BUN SERPL-MCNC: 14 MG/DL (ref 5–25)
CALCIUM SERPL-MCNC: 9.1 MG/DL (ref 8.3–10.1)
CHLORIDE SERPL-SCNC: 93 MMOL/L (ref 100–108)
CO2 SERPL-SCNC: 34 MMOL/L (ref 21–32)
CREAT SERPL-MCNC: 0.63 MG/DL (ref 0.6–1.3)
ERYTHROCYTE [DISTWIDTH] IN BLOOD BY AUTOMATED COUNT: 16.6 % (ref 11.6–15.1)
GFR SERPL CREATININE-BSD FRML MDRD: 114 ML/MIN/1.73SQ M
GLUCOSE SERPL-MCNC: 112 MG/DL (ref 65–140)
GLUCOSE SERPL-MCNC: 118 MG/DL (ref 65–140)
GLUCOSE SERPL-MCNC: 119 MG/DL (ref 65–140)
GLUCOSE SERPL-MCNC: 121 MG/DL (ref 65–140)
GLUCOSE SERPL-MCNC: 135 MG/DL (ref 65–140)
GLUCOSE SERPL-MCNC: 136 MG/DL (ref 65–140)
HCT VFR BLD AUTO: 25.6 % (ref 36.5–49.3)
HGB BLD-MCNC: 7.8 G/DL (ref 12–17)
MCH RBC QN AUTO: 28.3 PG (ref 26.8–34.3)
MCHC RBC AUTO-ENTMCNC: 30.5 G/DL (ref 31.4–37.4)
MCV RBC AUTO: 93 FL (ref 82–98)
PLATELET # BLD AUTO: 209 THOUSANDS/UL (ref 149–390)
PMV BLD AUTO: 10.3 FL (ref 8.9–12.7)
POTASSIUM SERPL-SCNC: 4.1 MMOL/L (ref 3.5–5.3)
RBC # BLD AUTO: 2.76 MILLION/UL (ref 3.88–5.62)
SODIUM SERPL-SCNC: 129 MMOL/L (ref 136–145)
T4 FREE SERPL-MCNC: 1.22 NG/DL (ref 0.76–1.46)
TSH SERPL DL<=0.05 MIU/L-ACNC: 6.64 UIU/ML (ref 0.45–4.5)
WBC # BLD AUTO: 5.38 THOUSAND/UL (ref 4.31–10.16)

## 2022-07-10 PROCEDURE — 82948 REAGENT STRIP/BLOOD GLUCOSE: CPT

## 2022-07-10 PROCEDURE — 80048 BASIC METABOLIC PNL TOTAL CA: CPT

## 2022-07-10 PROCEDURE — 94760 N-INVAS EAR/PLS OXIMETRY 1: CPT

## 2022-07-10 PROCEDURE — 85027 COMPLETE CBC AUTOMATED: CPT

## 2022-07-10 PROCEDURE — 99024 POSTOP FOLLOW-UP VISIT: CPT | Performed by: SURGERY

## 2022-07-10 PROCEDURE — 84439 ASSAY OF FREE THYROXINE: CPT | Performed by: INTERNAL MEDICINE

## 2022-07-10 PROCEDURE — 85730 THROMBOPLASTIN TIME PARTIAL: CPT | Performed by: SURGERY

## 2022-07-10 PROCEDURE — 84443 ASSAY THYROID STIM HORMONE: CPT | Performed by: INTERNAL MEDICINE

## 2022-07-10 PROCEDURE — 94640 AIRWAY INHALATION TREATMENT: CPT

## 2022-07-10 PROCEDURE — 99232 SBSQ HOSP IP/OBS MODERATE 35: CPT | Performed by: INTERNAL MEDICINE

## 2022-07-10 RX ORDER — SODIUM CHLORIDE 1000 MG
2 TABLET, SOLUBLE MISCELLANEOUS 3 TIMES DAILY
Status: DISCONTINUED | OUTPATIENT
Start: 2022-07-10 | End: 2022-07-19 | Stop reason: HOSPADM

## 2022-07-10 RX ADMIN — METOPROLOL TARTRATE 25 MG: 25 TABLET, FILM COATED ORAL at 23:37

## 2022-07-10 RX ADMIN — HEPARIN SODIUM 26 UNITS/KG/HR: 10000 INJECTION, SOLUTION INTRAVENOUS at 05:08

## 2022-07-10 RX ADMIN — HEPARIN SODIUM 26 UNITS/KG/HR: 10000 INJECTION, SOLUTION INTRAVENOUS at 12:58

## 2022-07-10 RX ADMIN — DIGOXIN 250 MCG: 250 TABLET ORAL at 09:10

## 2022-07-10 RX ADMIN — METOCLOPRAMIDE HYDROCHLORIDE 10 MG: 5 INJECTION INTRAMUSCULAR; INTRAVENOUS at 23:37

## 2022-07-10 RX ADMIN — GABAPENTIN 300 MG: 250 SOLUTION ORAL at 17:14

## 2022-07-10 RX ADMIN — HYDROMORPHONE HYDROCHLORIDE 1 MG: 1 INJECTION, SOLUTION INTRAMUSCULAR; INTRAVENOUS; SUBCUTANEOUS at 19:22

## 2022-07-10 RX ADMIN — METOCLOPRAMIDE HYDROCHLORIDE 10 MG: 5 INJECTION INTRAMUSCULAR; INTRAVENOUS at 05:08

## 2022-07-10 RX ADMIN — METHOCARBAMOL TABLETS 750 MG: 750 TABLET, COATED ORAL at 12:58

## 2022-07-10 RX ADMIN — METOPROLOL TARTRATE 25 MG: 25 TABLET, FILM COATED ORAL at 09:10

## 2022-07-10 RX ADMIN — ACETAMINOPHEN 975 MG: 325 TABLET, FILM COATED ORAL at 21:15

## 2022-07-10 RX ADMIN — GABAPENTIN 300 MG: 250 SOLUTION ORAL at 21:15

## 2022-07-10 RX ADMIN — SODIUM CHLORIDE TAB 1 GM 1 G: 1 TAB at 09:10

## 2022-07-10 RX ADMIN — PANTOPRAZOLE SODIUM 20 MG: 20 TABLET, DELAYED RELEASE ORAL at 05:08

## 2022-07-10 RX ADMIN — SODIUM CHLORIDE TAB 1 GM 2 G: 1 TAB at 21:15

## 2022-07-10 RX ADMIN — IPRATROPIUM BROMIDE 0.5 MG: 0.5 SOLUTION RESPIRATORY (INHALATION) at 08:31

## 2022-07-10 RX ADMIN — METOPROLOL TARTRATE 25 MG: 25 TABLET, FILM COATED ORAL at 17:14

## 2022-07-10 RX ADMIN — ACETAMINOPHEN 975 MG: 325 TABLET, FILM COATED ORAL at 05:08

## 2022-07-10 RX ADMIN — OXYCODONE HYDROCHLORIDE 10 MG: 5 SOLUTION ORAL at 09:20

## 2022-07-10 RX ADMIN — HEPARIN SODIUM 26 UNITS/KG/HR: 10000 INJECTION, SOLUTION INTRAVENOUS at 21:10

## 2022-07-10 RX ADMIN — SODIUM CHLORIDE TAB 1 GM 2 G: 1 TAB at 17:14

## 2022-07-10 RX ADMIN — LEVALBUTEROL HYDROCHLORIDE 1.25 MG: 1.25 SOLUTION, CONCENTRATE RESPIRATORY (INHALATION) at 13:31

## 2022-07-10 RX ADMIN — OXYCODONE HYDROCHLORIDE 10 MG: 5 SOLUTION ORAL at 17:34

## 2022-07-10 RX ADMIN — BUDESONIDE AND FORMOTEROL FUMARATE DIHYDRATE 2 PUFF: 160; 4.5 AEROSOL RESPIRATORY (INHALATION) at 19:20

## 2022-07-10 RX ADMIN — LEVALBUTEROL HYDROCHLORIDE 1.25 MG: 1.25 SOLUTION, CONCENTRATE RESPIRATORY (INHALATION) at 08:31

## 2022-07-10 RX ADMIN — OXYCODONE HYDROCHLORIDE 10 MG: 5 SOLUTION ORAL at 00:18

## 2022-07-10 RX ADMIN — METHOCARBAMOL TABLETS 750 MG: 750 TABLET, COATED ORAL at 23:37

## 2022-07-10 RX ADMIN — METHOCARBAMOL TABLETS 750 MG: 750 TABLET, COATED ORAL at 17:14

## 2022-07-10 RX ADMIN — LEVOTHYROXINE SODIUM 25 MCG: 25 TABLET ORAL at 05:08

## 2022-07-10 RX ADMIN — METHOCARBAMOL TABLETS 750 MG: 750 TABLET, COATED ORAL at 05:08

## 2022-07-10 RX ADMIN — IPRATROPIUM BROMIDE 0.5 MG: 0.5 SOLUTION RESPIRATORY (INHALATION) at 13:31

## 2022-07-10 RX ADMIN — ACETAMINOPHEN 975 MG: 325 TABLET, FILM COATED ORAL at 13:00

## 2022-07-10 RX ADMIN — METOCLOPRAMIDE HYDROCHLORIDE 10 MG: 5 INJECTION INTRAMUSCULAR; INTRAVENOUS at 12:58

## 2022-07-10 RX ADMIN — QUETIAPINE FUMARATE 50 MG: 25 TABLET ORAL at 21:15

## 2022-07-10 RX ADMIN — GABAPENTIN 300 MG: 250 SOLUTION ORAL at 09:10

## 2022-07-10 NOTE — PLAN OF CARE
Problem: Prexisting or High Potential for Compromised Skin Integrity  Goal: Skin integrity is maintained or improved  Description: INTERVENTIONS:  - Identify patients at risk for skin breakdown  - Assess and monitor skin integrity  - Assess and monitor nutrition and hydration status  - Monitor labs   - Assess for incontinence   - Turn and reposition patient  - Assist with mobility/ambulation  - Relieve pressure over bony prominences  - Avoid friction and shearing  - Provide appropriate hygiene as needed including keeping skin clean and dry  - Evaluate need for skin moisturizer/barrier cream  - Collaborate with interdisciplinary team   - Patient/family teaching  - Consider wound care consult   Outcome: Progressing     Problem: Potential for Falls  Goal: Patient will remain free of falls  Description: INTERVENTIONS:  - Educate patient/family on patient safety including physical limitations  - Instruct patient to call for assistance with activity   - Consult OT/PT to assist with strengthening/mobility   - Keep Call bell within reach  - Keep bed low and locked with side rails adjusted as appropriate  - Keep care items and personal belongings within reach  - Initiate and maintain comfort rounds  - Make Fall Risk Sign visible to staff  - Offer Toileting every 1 Hours, in advance of need  - Initiate/Maintain alarm  - Obtain necessary fall risk management equipment  - Apply yellow socks and bracelet for high fall risk patients  - Consider moving patient to room near nurses station  Outcome: Progressing     Problem: MOBILITY - ADULT  Goal: Maintain or return to baseline ADL function  Description: INTERVENTIONS:  -  Assess patient's ability to carry out ADLs; assess patient's baseline for ADL function and identify physical deficits which impact ability to perform ADLs (bathing, care of mouth/teeth, toileting, grooming, dressing, etc )  - Assess/evaluate cause of self-care deficits   - Assess range of motion  - Assess patient's mobility; develop plan if impaired  - Assess patient's need for assistive devices and provide as appropriate  - Encourage maximum independence but intervene and supervise when necessary  - Involve family in performance of ADLs  - Assess for home care needs following discharge   - Consider OT consult to assist with ADL evaluation and planning for discharge  - Provide patient education as appropriate  Outcome: Progressing  Goal: Maintains/Returns to pre admission functional level  Description: INTERVENTIONS:  - Perform BMAT or MOVE assessment daily    - Set and communicate daily mobility goal to care team and patient/family/caregiver  - Collaborate with rehabilitation services on mobility goals if consulted  - Perform Range of Motion 3 times a day  - Reposition patient every 2 hours  - Dangle patient 3 times a day  - Stand patient 3 times a day  - Ambulate patient 3 times a day  - Out of bed to chair 3 times a day   - Out of bed for meals 3 times a day  - Out of bed for toileting  - Record patient progress and toleration of activity level   Outcome: Progressing     Problem: Nutrition/Hydration-ADULT  Goal: Nutrient/Hydration intake appropriate for improving, restoring or maintaining nutritional needs  Description: Monitor and assess patient's nutrition/hydration status for malnutrition  Collaborate with interdisciplinary team and initiate plan and interventions as ordered  Monitor patient's weight and dietary intake as ordered or per policy  Utilize nutrition screening tool and intervene as necessary  Determine patient's food preferences and provide high-protein, high-caloric foods as appropriate       INTERVENTIONS:  - Monitor oral intake, urinary output, labs, and treatment plans  - Assess nutrition and hydration status and recommend course of action  - Evaluate amount of meals eaten  - Assist patient with eating if necessary   - Allow adequate time for meals  - Recommend/ encourage appropriate diets, oral nutritional supplements, and vitamin/mineral supplements  - Order, calculate, and assess calorie counts as needed  - Recommend, monitor, and adjust tube feedings and TPN/PPN based on assessed needs  - Assess need for intravenous fluids  - Provide specific nutrition/hydration education as appropriate  - Include patient/family/caregiver in decisions related to nutrition  Outcome: Progressing     Problem: PAIN - ADULT  Goal: Verbalizes/displays adequate comfort level or baseline comfort level  Description: Interventions:  - Encourage patient to monitor pain and request assistance  - Assess pain using appropriate pain scale  - Administer analgesics based on type and severity of pain and evaluate response  - Implement non-pharmacological measures as appropriate and evaluate response  - Consider cultural and social influences on pain and pain management  - Notify physician/advanced practitioner if interventions unsuccessful or patient reports new pain  Outcome: Progressing     Problem: INFECTION - ADULT  Goal: Absence or prevention of progression during hospitalization  Description: INTERVENTIONS:  - Assess and monitor for signs and symptoms of infection  - Monitor lab/diagnostic results  - Monitor all insertion sites, i e  indwelling lines, tubes, and drains  - Monitor endotracheal if appropriate and nasal secretions for changes in amount and color  - [place] appropriate cooling/warming therapies per order  - Administer medications as ordered  - Instruct and encourage patient and family to use good hand hygiene technique  - Identify and instruct in appropriate isolation precautions for identified infection/condition  Outcome: Progressing     Problem: DISCHARGE PLANNING  Goal: Discharge to home or other facility with appropriate resources  Description: INTERVENTIONS:  - Identify barriers to discharge w/patient and caregiver  - Arrange for needed discharge resources and transportation as appropriate  - Identify discharge learning needs (meds, wound care, etc )  - Arrange for interpretive services to assist at discharge as needed  - Refer to Case Management Department for coordinating discharge planning if the patient needs post-hospital services based on physician/advanced practitioner order or complex needs related to functional status, cognitive ability, or social support system  Outcome: Progressing     Problem: Knowledge Deficit  Goal: Patient/family/caregiver demonstrates understanding of disease process, treatment plan, medications, and discharge instructions  Description: Complete learning assessment and assess knowledge base    Interventions:  - Provide teaching at level of understanding  - Provide teaching via preferred learning methods  Outcome: Progressing     Problem: CARDIOVASCULAR - ADULT  Goal: Maintains optimal cardiac output and hemodynamic stability  Description: INTERVENTIONS:  - Monitor I/O, vital signs and rhythm  - Monitor for S/S and trends of decreased cardiac output  - Administer and titrate ordered vasoactive medications to optimize hemodynamic stability  - Assess quality of pulses, skin color and temperature  - Assess for signs of decreased coronary artery perfusion  - Instruct patient to report change in severity of symptoms  Outcome: Progressing  Goal: Absence of cardiac dysrhythmias or at baseline rhythm  Description: INTERVENTIONS:  - Continuous cardiac monitoring, vital signs, obtain 12 lead EKG if ordered  - Administer antiarrhythmic and heart rate control medications as ordered  - Monitor electrolytes and administer replacement therapy as ordered  Outcome: Progressing     Problem: RESPIRATORY - ADULT  Goal: Achieves optimal ventilation and oxygenation  Description: INTERVENTIONS:  - Assess for changes in respiratory status  - Assess for changes in mentation and behavior  - Position to facilitate oxygenation and minimize respiratory effort  - Oxygen administered by appropriate delivery if ordered  - Initiate smoking cessation education as indicated  - Encourage broncho-pulmonary hygiene including cough, deep breathe, Incentive Spirometry  - Assess the need for suctioning and aspirate as needed  - Assess and instruct to report SOB or any respiratory difficulty  - Respiratory Therapy support as indicated  Outcome: Progressing     Problem: GASTROINTESTINAL - ADULT  Goal: Minimal or absence of nausea and/or vomiting  Description: INTERVENTIONS:  - Administer IV fluids if ordered to ensure adequate hydration  - Maintain NPO status until nausea and vomiting are resolved  - Nasogastric tube if ordered  - Administer ordered antiemetic medications as needed  - Provide nonpharmacologic comfort measures as appropriate  - Advance diet as tolerated, if ordered  - Consider nutrition services referral to assist patient with adequate nutrition and appropriate food choices  Outcome: Progressing  Goal: Maintains or returns to baseline bowel function  Description: INTERVENTIONS:  - Assess bowel function  - Encourage oral fluids to ensure adequate hydration  - Administer IV fluids if ordered to ensure adequate hydration  - Administer ordered medications as needed  - Encourage mobilization and activity  - Consider nutritional services referral to assist patient with adequate nutrition and appropriate food choices  Outcome: Progressing  Goal: Maintains adequate nutritional intake  Description: INTERVENTIONS:  - Monitor percentage of each meal consumed  - Identify factors contributing to decreased intake, treat as appropriate  - Assist with meals as needed  - Monitor I&O, weight, and lab values if indicated  - Obtain nutrition services referral as needed  Outcome: Progressing  Goal: Establish and maintain optimal ostomy function  Description: INTERVENTIONS:  - Assess bowel function  - Encourage oral fluids to ensure adequate hydration  - Administer IV fluids if ordered to ensure adequate hydration   - Administer ordered medications as needed  - Encourage mobilization and activity  - Nutrition services referral to assist patient with appropriate food choices  - Assess stoma site  - Consider wound care consult   Outcome: Progressing  Goal: Oral mucous membranes remain intact  Description: INTERVENTIONS  - Assess oral mucosa and hygiene practices  - Implement preventative oral hygiene regimen  - Implement oral medicated treatments as ordered  - Initiate Nutrition services referral as needed  Outcome: Progressing     Problem: GENITOURINARY - ADULT  Goal: Maintains or returns to baseline urinary function  Description: INTERVENTIONS:  - Assess urinary function  - Encourage oral fluids to ensure adequate hydration if ordered  - Administer IV fluids as ordered to ensure adequate hydration  - Administer ordered medications as needed  - Offer frequent toileting  - Follow urinary retention protocol if ordered  Outcome: Progressing  Goal: Absence of urinary retention  Description: INTERVENTIONS:  - Assess patients ability to void and empty bladder  - Monitor I/O  - Bladder scan as needed  - Discuss with physician/AP medications to alleviate retention as needed  - Discuss catheterization for long term situations as appropriate  Outcome: Progressing  Goal: Urinary catheter remains patent  Description: INTERVENTIONS:  - Assess patency of urinary catheter  - If patient has a chronic carmen, consider changing catheter if non-functioning  - Follow guidelines for intermittent irrigation of non-functioning urinary catheter  Outcome: Progressing     Problem: SKIN/TISSUE INTEGRITY - ADULT  Goal: Skin Integrity remains intact(Skin Breakdown Prevention)  Description: Assess:  -Perform Arnoldo assessment every shift  -Clean and moisturize skin every day  -Inspect skin when repositioning, toileting, and assisting with ADLS  -Assess extremities for adequate circulation and sensation     Bed Management:  -Have minimal linens on bed & keep smooth, unwrinkled  -Change linens as needed when moist or perspiring  -Avoid sitting or lying in one position for more than 2 hours while in bed  -Keep HOB at [address]:  -Offer bedside commode  -Assess for incontinence every   -Use incontinent care products after each incontinent episode such as foam    Activity:  -Mobilize patient 3 times a day  -Encourage activity and walks on unit  -Encourage or provide ROM exercises   -Turn and reposition patient every 2 Hours  -Use appropriate equipment to lift or move patient in bed  -Instruct/ Assist with weight shifting every 2 when out of bed in chair  -Consider limitation of chair time 1 hour intervals    Skin Care:  -Avoid use of baby powder, tape, friction and shearing, hot water or constrictive clothing  -Relieve pressure over bony prominences using wedges  -Do not massage red bony areas      Outcome: Progressing  Goal: Incision(s), wounds(s) or drain site(s) healing without S/S of infection  Description: INTERVENTIONS  - Assess and document dressing, incision, wound bed, drain sites and surrounding tissue  - Provide patient and family education  - Perform skin care/dressing changes every day  Outcome: Progressing  Goal: Pressure injury heals and does not worsen  Description: Interventions:  - Implement low air loss mattress or specialty surface (Criteria met)  - Apply silicone foam dressing  - Instruct/assist with weight shifting every 30 minutes when in chair   - Limit chair time to 1 hour intervals  - Use special pressure reducing interventions such as waffle when in chair   - Apply fecal or urinary incontinence containment device   - Perform passive or active ROM every day  - Turn and reposition patient & offload bony prominences every 2 hours   - Utilize friction reducing device or surface for transfers   - Consider consults to  interdisciplinary teams such as   - Use incontinent care products after each incontinent episode such as   - Consider nutrition services referral as needed  Outcome: Progressing

## 2022-07-10 NOTE — QUICK NOTE
General Surgery Dressing Change    The patient was premedicated with Dilaudid  Wound VAC removal    The wound vac was turned off suction and the tubes were clamped  Starting with the vac on the abdomen, noted the number of sponges (2 black, 12 white)  Carefully removed the overlying dressing and confirmed there were 2 black and 12 white sponges  Used 3 large and 2 small adaptic to cover the entire surface area  Unrolled 1 kerlix onto the wound, placed 3 small abd pads over, and taped down  Removed the packing tape and overlying gauze from the wound located at the right of midline  Re-dressed using new packing tape and gauze  There were 2 black sponges on the LLE vac per documentation  Confirmed there were 2 black sponges and removed the vac dressing  There was adaptic in place, so the sponges were removed carefully and adaptic left in place  Placed 3 abd pads to cover the surface of the wound and secured with tape          Abdomen        LLE

## 2022-07-10 NOTE — PROGRESS NOTES
NEPHROLOGY PROGRESS NOTE   Marilu Jordan 47 y o  male MRN: [id number]  Unit/Bed#: [facility] 607-01 Encounter: [id number]        ASSESSMENT & PLAN    1  Hyponatremia likely secondary to volume depletion setting of diuretic use sodium decreased to 129   · Continue to hold diuretics  · Increase salt tablets to 2 g t i d   · Repeat BMP tomorrow  · Serum osmolality mildly low  · Urine osmolality 297  · Urine sodium low  · Not tolerating much p o  Intake  · Check TSH  · Check a m  Cortisol  · PPI, Seroquel can contribute to hyponatremia  · If no improvement can place on a fluid restriction as well     2  Hypotension-blood pressures on the lower side 90/60 and will discontinue spironolactone and torsemide Will hold for now and monitor, maintained on metoprolol 25 mg every 8 hours      3  Small-bowel obstruction with ventral hernia-surgery following         SUBJECTIVE:    Patient was seen today  Overall no acute complete he states he is tolerating p o  Intake better today    12 point review of systems was otherwise negative besides what is mentioned above      Medications:    Current Facility-Administered Medications:     acetaminophen (TYLENOL) tablet 975 mg, 975 mg, Oral, Q8H [address], Génesis Rape Allsbrook, DO, 975 mg at 07/10/22 0508    benzocaine (HURRICAINE) 20 % mucosal spray 2 spray, 2 spray, Mucosal, Once, Génesis Rape Allsbrook, DO    bisacodyl (DULCOLAX) rectal suppository 10 mg, 10 mg, Rectal, Daily, Génesis Rape Allsbrook, DO, 10 mg at 07/09/22 1154    budesonide-formoterol (SYMBICORT) 160-4 5 mcg/act inhaler 2 puff, 2 puff, Inhalation, BID, Génesis Rape Allsbrook, DO, 2 puff at 07/09/22 1931    digoxin (LANOXIN) tablet 250 mcg, 250 mcg, Oral, Daily, James Joseph MD, 250 mcg at 07/10/22 0910    gabapentin (NEURONTIN) oral solution 300 mg, 300 mg, Oral, TID, Génesis Rape Allsbrook, DO, 300 mg at 07/10/22 0910    heparin (porcine) 25,000 units in 0 45% NaCl 250 mL infusion (premix), 3-30 Units/kg/hr (Order-Specific), Intravenous, Titrated, Ernesto Jackson, DO, Last Rate: 32 5 mL/hr at 07/10/22 0508, 26 Units/kg/hr at 07/10/22 0508    heparin (porcine) injection 10,000 Units, 10,000 Units, Intravenous, Q1H PRN, Bevelyn Paradise, DO, 10,000 Units at 06/18/22 1803    heparin (porcine) injection 5,000 Units, 5,000 Units, Intravenous, Q1H PRN, Beelizabethn Cynthiaise, DO, 5,000 Units at 07/08/22 1245    HYDROmorphone (DILAUDID) injection 1 mg, 1 mg, Intravenous, Q6H PRN, Ernesto Marieise, DO, 1 mg at 07/08/22 2043    insulin lispro (HumaLOG) 100 units/mL subcutaneous injection 1-5 Units, 1-5 Units, Subcutaneous, Q6H [address], 1 Units at 07/08/22 1751 **AND** Fingerstick Glucose (POCT), , , Q6H, Nciolas Strong, DO    ipratropium (ATROVENT) 0 02 % inhalation solution 0 5 mg, 0 5 mg, Nebulization, TID, Nicolas Strong, DO, 0 5 mg at 07/10/22 0831    levalbuterol (XOPENEX) inhalation solution 1 25 mg, 1 25 mg, Nebulization, TID, Nicolas Strong, DO, 1 25 mg at 07/10/22 0831    levothyroxine tablet 25 mcg, 25 mcg, Oral, Early Morning, Nicolas Strong, DO, 25 mcg at 07/10/22 0508    lidocaine (URO-JET) 2 % urethral/mucosal gel 1 application, 1 application, Urethral, Once, Ernesto Jackson, DO    methocarbamol (ROBAXIN) tablet 750 mg, 750 mg, Oral, Q6H [address], Marshal P Ligia, DO, 750 mg at 07/10/22 0508    metoclopramide (REGLAN) injection 10 mg, 10 mg, Intravenous, Q6H [address], Nicolas Strong, DO, 10 mg at 07/10/22 0508    metoprolol tartrate (LOPRESSOR) tablet 25 mg, 25 mg, Oral, Q8H, Garland Oliver MD, 25 mg at 07/10/22 0910    ondansetron (ZOFRAN) injection 4 mg, 4 mg, Intravenous, Q4H PRN, Nicolas Strong DO, 4 mg at 07/03/22 0439    oxyCODONE (ROXICODONE) oral solution 10 mg, 10 mg, Oral, Q4H PRN, Nicolas Strong DO, 10 mg at 07/10/22 0920    oxyCODONE (ROXICODONE) oral solution 5 mg, 5 mg, Oral, Q4H PRN, Nicolas Strong DO, 5 mg at 07/09/22 1208    pantoprazole (PROTONIX) EC tablet 20 mg, 20 mg, Oral, Early Morning, Cat Chris Allsbrook, DO, 20 mg at 07/10/22 0508    polyethylene glycol (MIRALAX) packet 17 g, 17 g, Oral, Daily, Cat Chris Allsbrook, DO, 17 g at 07/09/22 0940    QUEtiapine (SEROquel) tablet 50 mg, 50 mg, Oral, HS, Cat Chris Allsbrook, DO, 50 mg at 07/09/22 2117    senna-docusate sodium (SENOKOT S) 8 6-50 mg per tablet 1 tablet, 1 tablet, Oral, BID, Cat Chris Allsbrook, DO, 1 tablet at 07/09/22 1749    sodium chloride tablet 2 g, 2 g, Oral, TID, Sathya Hurley DO    OBJECTIVE:    Vitals:    07/10/22 0253 07/10/22 0621 07/10/22 0833 07/10/22 1046   BP: 123/68   120/59   Pulse: 86 94  78   Resp:  20  20   Temp:  99 2 °F (37 3 °C)  99 8 °F (37 7 °C)   TempSrc:       SpO2: 100% 96% 93% 92%   Weight:       Height:            Temp:  [98 3 °F (36 8 °C)-99 8 °F (37 7 °C)] 99 8 °F (37 7 °C)  HR:  [78-94] 78  Resp:  [18-20] 20  BP: (120-132)/(59-70) 120/59  SpO2:  [90 %-100 %] 92 %     Body mass index is 69 12 kg/m²  Weight (last 2 days)     Date/Time Weight    07/08/22 0551 225 (495 59)          I/O last 3 completed shifts: In: 950 [P O :480; I V :470]  Out: 1050 [Urine:675; Drains:375]    No intake/output data recorded        Physical exam:    General: no acute distress, cooperative, elevated BMI  Eyes: conjunctivae pink, anicteric sclerae  ENT: lips and mucous membranes moist, no exudates, normal external ears  Neck: ROM intact, no JVD  Chest: No respiratory distress, no accessory muscle use  CVS: normal rate, non pericardial friction rub  Abdomen: soft, non-tender, non-distended, normoactive bowel sounds  Extremities: no edema of both legs  Skin: no rash  Neuro: awake, alert, oriented, grossly intact  Psych:  Pleasant affect    Invasive Devices:      Lab Results:   Results from last 7 days   Lab Units 07/10/22  0605 07/10/22  0533 07/09/22  0626 07/08/22  0530 07/07/22  1534 07/07/22  0558 07/06/22  0250 07/06/22  0245 07/05/22  0321 07/04/22  0612   WBC Thousand/uL 5 38 --  6 09 5 27  --  5 55 4 83  --  4 77  --    HEMOGLOBIN g/dL 7 8*  --  7 7* 8 0*  --  7 9* 7 9*  --  8 6*  --    HEMATOCRIT % 25 6*  --  24 9* 25 7*  --  26 5* 26 4*  --  28 2*  --    PLATELETS Thousands/uL 209  --  224 233  --  219 207  --  235  --    POTASSIUM mmol/L  --  4 1 4 0 4 0 3 8 4 1  --    < > 3 6 3 7   CHLORIDE mmol/L  --  93* 92* 91* 90* 90*  --    < > 90* 92*   CO2 mmol/L  --  34* 34* 35* 33* 35*  --    < > 38* 37*   BUN mg/dL  --  14 14 11 11 13  --    < > 18 23   CREATININE mg/dL  --  0 63 0 68 0 58* 0 59* 0 60  --    < > 0 64 0 71   CALCIUM mg/dL  --  9 1 8 8 9 1 9 0 9 0  --    < > 8 6 9 0   MAGNESIUM mg/dL  --   --   --  2 3  --  2 1  --   --  2 1 2 3   PHOSPHORUS mg/dL  --   --   --  3 1  --  3 1  --   --  3 2 3 9   ALK PHOS U/L  --   --   --  161*  --   --   --   --   --   --    ALT U/L  --   --   --  20  --   --   --   --   --   --    AST U/L  --   --   --  19  --   --   --   --   --   --     < > = values in this interval not displayed  Portions of the record may have been created with voice recognition software  Occasional wrong word or "sound a like" substitutions may have occurred due to the inherent limitations of voice recognition software  Read the chart carefully and recognize, using context, where substitutions have occurred  If you have any questions, please contact the dictating provider

## 2022-07-10 NOTE — PROGRESS NOTES
Progress Note - Cici Benavides 47 y o  male MRN: [id number]    Unit/Bed#: [facility] 607-01 Encounter: [id number]      Assessment:  Patient is a 71-year-old male with complex PMHx who presented w a SBO and ventral hernia now postop day 10 from an attempted STSG, I&D, washout and VAC placement on 06/30 and now postop day 5 from a 2nd STSG on 07/05  Plan:  - Take vacs down today  - continue to monitor hemoglobin and WBC  - appreciate nephrology recommendations on hyponatremia - likely volume depletion  Held diuretics, gave 1 L NS, q8 BMPs to monitor Na, call nephro for Na < 129  - continue multimodal pain and nausea control p r n   - please tiger text on-call surgery resident with questions/concerns    Subjective:   No acute events overnight  Vital signs stable  Uses 2 L oxygen overnight via nasal cannula routinely  Continues to tolerate carb controlled diet well without any N/V/D  Pain is well controlled on current multimodal regimen  Voiding bladder spontaneously  Having regular bowel movements  Two wound vacs continue to be in place  Both are C/D/I and without air leak set to continuous suction at 125 mmHg  The leg vac put out 0 cc of output in the last 12 hours and 125 cc of output total overall in the past 24 hours  The abdominal vac put out 50 cc in the last 12 hours  Drainage is serosanguineous in nature  Patient feels well overall  Reports no subjective fevers or chills  Denies headache, chest pain, shortness of breath, nausea, vomiting, and diarrhea  WBC and H&H are stable  Na continues to very minimally fluctuate at this level  Na: 130 --> 129  Hgb: 7 7 --> 7 8  WBC: 6 09 --> 5 38    Objective:     Vitals: Blood pressure 123/68, pulse 94, temperature 99 2 °F (37 3 °C), resp  rate 20, height 5' 11" (1 803 m), weight (!) 225 kg (495 lb 9 5 oz), SpO2 96 %  ,Body mass index is 69 12 kg/m²        Intake/Output Summary (Last 24 hours) at 7/10/2022 3140  Last data filed at 7/10/2022 0022  Gross per 24 hour   Intake 950 ml   Output 850 ml   Net 100 ml       Physical Exam:  Gen: patient well appearing, no acute distress, resting comfortably in bed  CV: warm, well perfused  Lungs: normal work of breathing, no respiratory distress, using 2L NC  Abd: soft, non-distended, wound vac in place in anterior lower midline c/d/i without air leaks set to continuous suction at 125 mmHg w serosanguinous output  Neuro: alert and oriented, MS grossly intact  MSK: moves all extremities normally    Invasive Devices  Report    Peripherally Inserted Central Catheter Line  Duration           PICC Line 44/35/35 Right Basilic 26 days                Lab, Imaging and other studies: I have personally reviewed pertinent reports      VTE Pharmacologic Prophylaxis: Heparin  VTE Mechanical Prophylaxis: sequential compression device     Nunu Jang MD  PGY1 Orthopedic Surgery

## 2022-07-11 PROBLEM — E87.1 HYPONATREMIA: Status: ACTIVE | Noted: 2022-07-11

## 2022-07-11 LAB
ANION GAP SERPL CALCULATED.3IONS-SCNC: 3 MMOL/L (ref 4–13)
APTT PPP: 67 SECONDS (ref 23–37)
BASOPHILS # BLD AUTO: 0.03 THOUSANDS/ΜL (ref 0–0.1)
BASOPHILS NFR BLD AUTO: 1 % (ref 0–1)
BUN SERPL-MCNC: 14 MG/DL (ref 5–25)
CALCIUM SERPL-MCNC: 9.2 MG/DL (ref 8.3–10.1)
CHLORIDE SERPL-SCNC: 95 MMOL/L (ref 100–108)
CO2 SERPL-SCNC: 32 MMOL/L (ref 21–32)
CORTIS AM PEAK SERPL-MCNC: 18.6 UG/DL (ref 4.2–22.4)
CREAT SERPL-MCNC: 0.61 MG/DL (ref 0.6–1.3)
EOSINOPHIL # BLD AUTO: 0.06 THOUSAND/ΜL (ref 0–0.61)
EOSINOPHIL NFR BLD AUTO: 1 % (ref 0–6)
ERYTHROCYTE [DISTWIDTH] IN BLOOD BY AUTOMATED COUNT: 16.6 % (ref 11.6–15.1)
GFR SERPL CREATININE-BSD FRML MDRD: 115 ML/MIN/1.73SQ M
GLUCOSE SERPL-MCNC: 111 MG/DL (ref 65–140)
GLUCOSE SERPL-MCNC: 123 MG/DL (ref 65–140)
GLUCOSE SERPL-MCNC: 124 MG/DL (ref 65–140)
GLUCOSE SERPL-MCNC: 135 MG/DL (ref 65–140)
GLUCOSE SERPL-MCNC: 140 MG/DL (ref 65–140)
HCT VFR BLD AUTO: 26.8 % (ref 36.5–49.3)
HGB BLD-MCNC: 8.1 G/DL (ref 12–17)
IMM GRANULOCYTES # BLD AUTO: 0.15 THOUSAND/UL (ref 0–0.2)
IMM GRANULOCYTES NFR BLD AUTO: 2 % (ref 0–2)
LYMPHOCYTES # BLD AUTO: 2.2 THOUSANDS/ΜL (ref 0.6–4.47)
LYMPHOCYTES NFR BLD AUTO: 34 % (ref 14–44)
MCH RBC QN AUTO: 28.4 PG (ref 26.8–34.3)
MCHC RBC AUTO-ENTMCNC: 30.2 G/DL (ref 31.4–37.4)
MCV RBC AUTO: 94 FL (ref 82–98)
MONOCYTES # BLD AUTO: 1 THOUSAND/ΜL (ref 0.17–1.22)
MONOCYTES NFR BLD AUTO: 15 % (ref 4–12)
NEUTROPHILS # BLD AUTO: 3.06 THOUSANDS/ΜL (ref 1.85–7.62)
NEUTS SEG NFR BLD AUTO: 47 % (ref 43–75)
NRBC BLD AUTO-RTO: 1 /100 WBCS
PLATELET # BLD AUTO: 212 THOUSANDS/UL (ref 149–390)
PMV BLD AUTO: 10.4 FL (ref 8.9–12.7)
POTASSIUM SERPL-SCNC: 4.2 MMOL/L (ref 3.5–5.3)
RBC # BLD AUTO: 2.85 MILLION/UL (ref 3.88–5.62)
SODIUM SERPL-SCNC: 130 MMOL/L (ref 136–145)
WBC # BLD AUTO: 6.5 THOUSAND/UL (ref 4.31–10.16)

## 2022-07-11 PROCEDURE — 85025 COMPLETE CBC W/AUTO DIFF WBC: CPT

## 2022-07-11 PROCEDURE — 85730 THROMBOPLASTIN TIME PARTIAL: CPT | Performed by: SURGERY

## 2022-07-11 PROCEDURE — 99232 SBSQ HOSP IP/OBS MODERATE 35: CPT | Performed by: INTERNAL MEDICINE

## 2022-07-11 PROCEDURE — 80048 BASIC METABOLIC PNL TOTAL CA: CPT

## 2022-07-11 PROCEDURE — 94640 AIRWAY INHALATION TREATMENT: CPT

## 2022-07-11 PROCEDURE — 94760 N-INVAS EAR/PLS OXIMETRY 1: CPT

## 2022-07-11 PROCEDURE — 99024 POSTOP FOLLOW-UP VISIT: CPT | Performed by: SURGERY

## 2022-07-11 PROCEDURE — 82948 REAGENT STRIP/BLOOD GLUCOSE: CPT

## 2022-07-11 PROCEDURE — 82533 TOTAL CORTISOL: CPT | Performed by: INTERNAL MEDICINE

## 2022-07-11 RX ORDER — INSULIN LISPRO 100 [IU]/ML
1-5 INJECTION, SOLUTION INTRAVENOUS; SUBCUTANEOUS
Status: DISCONTINUED | OUTPATIENT
Start: 2022-07-11 | End: 2022-07-11

## 2022-07-11 RX ORDER — INSULIN LISPRO 100 [IU]/ML
1-5 INJECTION, SOLUTION INTRAVENOUS; SUBCUTANEOUS
Status: DISCONTINUED | OUTPATIENT
Start: 2022-07-11 | End: 2022-07-19 | Stop reason: HOSPADM

## 2022-07-11 RX ORDER — LEVALBUTEROL 1.25 MG/.5ML
1.25 SOLUTION, CONCENTRATE RESPIRATORY (INHALATION)
Status: DISCONTINUED | OUTPATIENT
Start: 2022-07-11 | End: 2022-07-19 | Stop reason: HOSPADM

## 2022-07-11 RX ORDER — LEVALBUTEROL 1.25 MG/.5ML
1.25 SOLUTION, CONCENTRATE RESPIRATORY (INHALATION)
Status: DISCONTINUED | OUTPATIENT
Start: 2022-07-12 | End: 2022-07-11

## 2022-07-11 RX ADMIN — SODIUM CHLORIDE TAB 1 GM 2 G: 1 TAB at 09:07

## 2022-07-11 RX ADMIN — METOCLOPRAMIDE HYDROCHLORIDE 10 MG: 5 INJECTION INTRAMUSCULAR; INTRAVENOUS at 23:25

## 2022-07-11 RX ADMIN — LEVOTHYROXINE SODIUM 25 MCG: 25 TABLET ORAL at 05:11

## 2022-07-11 RX ADMIN — SODIUM CHLORIDE TAB 1 GM 2 G: 1 TAB at 17:00

## 2022-07-11 RX ADMIN — METOCLOPRAMIDE HYDROCHLORIDE 10 MG: 5 INJECTION INTRAMUSCULAR; INTRAVENOUS at 05:11

## 2022-07-11 RX ADMIN — HYDROMORPHONE HYDROCHLORIDE 1 MG: 1 INJECTION, SOLUTION INTRAMUSCULAR; INTRAVENOUS; SUBCUTANEOUS at 05:10

## 2022-07-11 RX ADMIN — HEPARIN SODIUM 26 UNITS/KG/HR: 10000 INJECTION, SOLUTION INTRAVENOUS at 19:55

## 2022-07-11 RX ADMIN — QUETIAPINE FUMARATE 50 MG: 25 TABLET ORAL at 21:29

## 2022-07-11 RX ADMIN — HEPARIN SODIUM 26 UNITS/KG/HR: 10000 INJECTION, SOLUTION INTRAVENOUS at 04:51

## 2022-07-11 RX ADMIN — METHOCARBAMOL TABLETS 750 MG: 750 TABLET, COATED ORAL at 05:11

## 2022-07-11 RX ADMIN — ACETAMINOPHEN 975 MG: 325 TABLET, FILM COATED ORAL at 05:11

## 2022-07-11 RX ADMIN — OXYCODONE HYDROCHLORIDE 10 MG: 5 SOLUTION ORAL at 03:52

## 2022-07-11 RX ADMIN — LEVALBUTEROL HYDROCHLORIDE 1.25 MG: 1.25 SOLUTION, CONCENTRATE RESPIRATORY (INHALATION) at 08:22

## 2022-07-11 RX ADMIN — OXYCODONE HYDROCHLORIDE 10 MG: 5 SOLUTION ORAL at 09:07

## 2022-07-11 RX ADMIN — HYDROMORPHONE HYDROCHLORIDE 1 MG: 1 INJECTION, SOLUTION INTRAMUSCULAR; INTRAVENOUS; SUBCUTANEOUS at 21:31

## 2022-07-11 RX ADMIN — DIGOXIN 250 MCG: 250 TABLET ORAL at 09:07

## 2022-07-11 RX ADMIN — SODIUM CHLORIDE TAB 1 GM 2 G: 1 TAB at 21:29

## 2022-07-11 RX ADMIN — SENNOSIDES AND DOCUSATE SODIUM 1 TABLET: 8.6; 5 TABLET ORAL at 09:07

## 2022-07-11 RX ADMIN — GABAPENTIN 300 MG: 250 SOLUTION ORAL at 17:00

## 2022-07-11 RX ADMIN — METOCLOPRAMIDE HYDROCHLORIDE 10 MG: 5 INJECTION INTRAMUSCULAR; INTRAVENOUS at 17:05

## 2022-07-11 RX ADMIN — PANTOPRAZOLE SODIUM 20 MG: 20 TABLET, DELAYED RELEASE ORAL at 05:11

## 2022-07-11 RX ADMIN — METOCLOPRAMIDE HYDROCHLORIDE 10 MG: 5 INJECTION INTRAMUSCULAR; INTRAVENOUS at 13:22

## 2022-07-11 RX ADMIN — IPRATROPIUM BROMIDE 0.5 MG: 0.5 SOLUTION RESPIRATORY (INHALATION) at 08:21

## 2022-07-11 RX ADMIN — HEPARIN SODIUM 26 UNITS/KG/HR: 10000 INJECTION, SOLUTION INTRAVENOUS at 13:22

## 2022-07-11 RX ADMIN — LEVALBUTEROL HYDROCHLORIDE 1.25 MG: 1.25 SOLUTION, CONCENTRATE RESPIRATORY (INHALATION) at 20:27

## 2022-07-11 RX ADMIN — METHOCARBAMOL TABLETS 750 MG: 750 TABLET, COATED ORAL at 17:04

## 2022-07-11 RX ADMIN — GABAPENTIN 300 MG: 250 SOLUTION ORAL at 21:31

## 2022-07-11 RX ADMIN — POLYETHYLENE GLYCOL 3350 17 G: 17 POWDER, FOR SOLUTION ORAL at 09:07

## 2022-07-11 RX ADMIN — METOPROLOL TARTRATE 25 MG: 25 TABLET, FILM COATED ORAL at 17:00

## 2022-07-11 RX ADMIN — IPRATROPIUM BROMIDE 0.5 MG: 0.5 SOLUTION RESPIRATORY (INHALATION) at 20:27

## 2022-07-11 RX ADMIN — METOPROLOL TARTRATE 25 MG: 25 TABLET, FILM COATED ORAL at 09:07

## 2022-07-11 RX ADMIN — BUDESONIDE AND FORMOTEROL FUMARATE DIHYDRATE 2 PUFF: 160; 4.5 AEROSOL RESPIRATORY (INHALATION) at 09:08

## 2022-07-11 RX ADMIN — OXYCODONE HYDROCHLORIDE 10 MG: 5 SOLUTION ORAL at 19:55

## 2022-07-11 NOTE — PROGRESS NOTES
Progress Note - General Surgery   General Luis 47 y o  male MRN: [id number]  Unit/Bed#: [facility] 607-01 Encounter: [id number]    Assessment:  53yo M with SBO and ventral hernia s/p attempted STSG, I&D, washout, and vac placement on 6/30 with second STSG on 7/5    Plan:  - appreciate nephro recs for hyponatremia  - hold diuretics, cont metoprolol 25mg Q8h, trend BMP  - cont daily packing of abdominal wound   - LLE dressing: remove nonadherent pieces of xeroform, leave adherent pieces intact  - start dispo planning    Subjective/Objective   Subjective: Tolerated wound vac removal yesterday  Pain well controlled  Objective:     Blood pressure 113/61, pulse 86, temperature 98 6 °F (37 °C), resp  rate 17, height 5' 11" (1 803 m), weight (!) 227 kg (500 lb 14 2 oz), SpO2 100 %  ,Body mass index is 69 86 kg/m²      No intake or output data in the 24 hours ending 07/11/22 0532    Invasive Devices  Report    Peripherally Inserted Central Catheter Line  Duration           PICC Line 03/04/11 Right Basilic 27 days                Physical Exam:  General: NAD  Neuro: A&Ox3  HEENT: Normocephalic, atraumatic, moist mucus membranes  CV: regular rate  Lung: normal work of breathing  Abd: soft, non-distended, clean dressing overlying midline wound with packed wound right of midline  MSK: normal ROM  Extremities: LLE with clean dressing overlying donor site      Jody Rahman MD  General Surgery PGY1

## 2022-07-11 NOTE — PROGRESS NOTES
NEPHROLOGY PROGRESS NOTE   Graciela Kuhn 47 y o  male MRN: [id number]  Unit/Bed#: [facility] 607-01 Encounter: [id number]      ASSESSMENT/PLAN:  1  Hyponatremia:  Possibly related to volume depletion as evidenced by low urine sodium +/- Seroquel and PPI   Sodium reached a low of 127 and now has been improving up to 130 today  · Diuretics have been on hold  · Initially had trial of IV fluids   · Currently on sodium chloride 2 g t i d  Which was increased yesterday  · Workup:  Serum osmolality 281, urine osmolality 297, urine sodium 5, A m  Cortisol 18 6, TSH elevated at 6 6 but T4 normal  2  Small-bowel obstruction and ventral hernia:  Status post laparotomy with lysis of adhesions and VAC placement 6/14  Now status post skin graft, I&D washout and VAC placement  3  Hypotension:  Blood pressure improving  Torsemide and spironolactone on hold    Plan Summary:    Continue sodium chloride 2 g t i d    Check a m  BMP    SUBJECTIVE:  Feeling well today  Denies any chest pain, shortness of breath, nausea, vomiting or diarrhea  He is tolerating p o  Intake      OBJECTIVE:  Current Weight: Weight - Scale: (!) 227 kg (500 lb 14 2 oz)  Vitals:    07/11/22 0822   BP:    Pulse:    Resp:    Temp:    SpO2: 94%     General:  appears comfortable and in no acute distress, obese   Skin:  No rash, warm, good skin turgor   Eyes:  Sclerae anicteric, no periorbital edema   ENT:  Moist mucous membranes  Neck:  Trachea midline, symmetric   Chest:  Clear to auscultation bilaterally with no wheezes, rales or rhonchi  CVS:  Regular rate and rhythm  Abdomen:  Soft,  nondistended  Neuro:  Awake and alert  Psych:  Appropriate affect  Extremities: no lower extremity edema       Medications:  Scheduled Meds:  Current Facility-Administered Medications   Medication Dose Route Frequency Provider Last Rate    acetaminophen  975 mg Oral Q8H [address] Marshal Strong DO      benzocaine  2 spray Mucosal Once Matilda Dangelo Strong DO      bisacodyl  10 mg Rectal Daily Kendal Ortiz, DO      budesonide-formoterol  2 puff Inhalation BID Kendal Ortiz, DO      digoxin  250 mcg Oral Daily Jose Elias Klein MD      gabapentin  300 mg Oral TID Kendal Ortiz, DO      heparin (porcine)  3-30 Units/kg/hr (Order-Specific) Intravenous Titrated Kendal Ortiz, DO 26 Units/kg/hr (07/11/22 0451)    heparin (porcine)  10,000 Units Intravenous Q1H PRN Kendal Ortiz, DO      heparin (porcine)  5,000 Units Intravenous Q1H PRN Kendal Ortiz, DO      HYDROmorphone  1 mg Intravenous Q6H PRN Kendal Ortiz, DO      insulin lispro  1-5 Units Subcutaneous Q6H [facility], DO      ipratropium  0 5 mg Nebulization TID Kendal Ortiz, DO      levalbuterol  1 25 mg Nebulization TID Kendal Ortiz, DO      levothyroxine  25 mcg Oral Early Morning Kendal Ortiz, DO      lidocaine  1 application Urethral Once Kendal Ortiz, DO      methocarbamol  750 mg Oral Q6H [facility], DO      metoclopramide  10 mg Intravenous Q6H [facility], DO      metoprolol tartrate  25 mg Oral Q8H Jose Elias Klein MD      ondansetron  4 mg Intravenous Q4H PRN Kendal Ortiz, DO      oxyCODONE  10 mg Oral Q4H PRN Kendal Ortiz, DO      oxyCODONE  5 mg Oral Q4H PRN Kendal Ortiz, DO      pantoprazole  20 mg Oral Early Morning Kendal Ortiz, DO      polyethylene glycol  17 g Oral Daily [facility], DO      QUEtiapine  50 mg Oral HS Kendal Ortiz, DO      senna-docusate sodium  1 tablet Oral BID [facility], DO      sodium chloride  2 g Oral TID [place], DO         PRN Meds:   heparin (porcine)    heparin (porcine)    HYDROmorphone    ondansetron    oxyCODONE    oxyCODONE    Continuous Infusions:heparin (porcine), 3-30 Units/kg/hr (Order-Specific), Last Rate: 26 Units/kg/hr (07/11/22 3108)        Laboratory Results:  Results from last 7 days   Lab Units 07/11/22  0458 07/10/22  4963 07/10/22  0533 07/09/22  0626 07/08/22  0530 07/07/22  1534 07/07/22  0558 07/06/22  0250 07/06/22  0245 07/05/22  0321   WBC Thousand/uL 6 50 5 38  --  6 09 5 27  --  5 55 4 83  --  4 77   HEMOGLOBIN g/dL 8 1* 7 8*  --  7 7* 8 0*  --  7 9* 7 9*  --  8 6*   HEMATOCRIT % 26 8* 25 6*  --  24 9* 25 7*  --  26 5* 26 4*  --  28 2*   PLATELETS Thousands/uL 212 209  --  224 233  --  219 207  --  235   SODIUM mmol/L 130*  --  129* 130* 129* 127* 127*  --  130* 130*   POTASSIUM mmol/L 4 2  --  4 1 4 0 4 0 3 8 4 1  --  4 0 3 6   CHLORIDE mmol/L 95*  --  93* 92* 91* 90* 90*  --  91* 90*   CO2 mmol/L 32  --  34* 34* 35* 33* 35*  --  33* 38*   BUN mg/dL 14  --  14 14 11 11 13  --  19 18   CREATININE mg/dL 0 61  --  0 63 0 68 0 58* 0 59* 0 60  --  0 59* 0 64   CALCIUM mg/dL 9 2  --  9 1 8 8 9 1 9 0 9 0  --  8 7 8 6   MAGNESIUM mg/dL  --   --   --   --  2 3  --  2 1  --   --  2 1   PHOSPHORUS mg/dL  --   --   --   --  3 1  --  3 1  --   --  3 2

## 2022-07-12 LAB
ANION GAP SERPL CALCULATED.3IONS-SCNC: 5 MMOL/L (ref 4–13)
APTT PPP: 109 SECONDS (ref 23–37)
APTT PPP: 60 SECONDS (ref 23–37)
APTT PPP: 95 SECONDS (ref 23–37)
BASOPHILS # BLD AUTO: 0.03 THOUSANDS/ΜL (ref 0–0.1)
BASOPHILS NFR BLD AUTO: 1 % (ref 0–1)
BUN SERPL-MCNC: 11 MG/DL (ref 5–25)
CALCIUM SERPL-MCNC: 8.7 MG/DL (ref 8.3–10.1)
CHLORIDE SERPL-SCNC: 94 MMOL/L (ref 100–108)
CO2 SERPL-SCNC: 32 MMOL/L (ref 21–32)
CREAT SERPL-MCNC: 0.6 MG/DL (ref 0.6–1.3)
EOSINOPHIL # BLD AUTO: 0.07 THOUSAND/ΜL (ref 0–0.61)
EOSINOPHIL NFR BLD AUTO: 1 % (ref 0–6)
ERYTHROCYTE [DISTWIDTH] IN BLOOD BY AUTOMATED COUNT: 17 % (ref 11.6–15.1)
GFR SERPL CREATININE-BSD FRML MDRD: 116 ML/MIN/1.73SQ M
GLUCOSE SERPL-MCNC: 107 MG/DL (ref 65–140)
GLUCOSE SERPL-MCNC: 110 MG/DL (ref 65–140)
GLUCOSE SERPL-MCNC: 111 MG/DL (ref 65–140)
GLUCOSE SERPL-MCNC: 112 MG/DL (ref 65–140)
GLUCOSE SERPL-MCNC: 173 MG/DL (ref 65–140)
HCT VFR BLD AUTO: 25.7 % (ref 36.5–49.3)
HGB BLD-MCNC: 7.6 G/DL (ref 12–17)
IMM GRANULOCYTES # BLD AUTO: 0.14 THOUSAND/UL (ref 0–0.2)
IMM GRANULOCYTES NFR BLD AUTO: 2 % (ref 0–2)
LYMPHOCYTES # BLD AUTO: 2.29 THOUSANDS/ΜL (ref 0.6–4.47)
LYMPHOCYTES NFR BLD AUTO: 39 % (ref 14–44)
MCH RBC QN AUTO: 27.5 PG (ref 26.8–34.3)
MCHC RBC AUTO-ENTMCNC: 29.6 G/DL (ref 31.4–37.4)
MCV RBC AUTO: 93 FL (ref 82–98)
MONOCYTES # BLD AUTO: 0.91 THOUSAND/ΜL (ref 0.17–1.22)
MONOCYTES NFR BLD AUTO: 15 % (ref 4–12)
NEUTROPHILS # BLD AUTO: 2.46 THOUSANDS/ΜL (ref 1.85–7.62)
NEUTS SEG NFR BLD AUTO: 42 % (ref 43–75)
NRBC BLD AUTO-RTO: 1 /100 WBCS
PLATELET # BLD AUTO: 209 THOUSANDS/UL (ref 149–390)
PMV BLD AUTO: 10.1 FL (ref 8.9–12.7)
POTASSIUM SERPL-SCNC: 4 MMOL/L (ref 3.5–5.3)
RBC # BLD AUTO: 2.76 MILLION/UL (ref 3.88–5.62)
SODIUM SERPL-SCNC: 131 MMOL/L (ref 136–145)
WBC # BLD AUTO: 5.9 THOUSAND/UL (ref 4.31–10.16)

## 2022-07-12 PROCEDURE — 94640 AIRWAY INHALATION TREATMENT: CPT

## 2022-07-12 PROCEDURE — 94760 N-INVAS EAR/PLS OXIMETRY 1: CPT

## 2022-07-12 PROCEDURE — 80048 BASIC METABOLIC PNL TOTAL CA: CPT

## 2022-07-12 PROCEDURE — 85730 THROMBOPLASTIN TIME PARTIAL: CPT | Performed by: SURGERY

## 2022-07-12 PROCEDURE — 85025 COMPLETE CBC W/AUTO DIFF WBC: CPT

## 2022-07-12 PROCEDURE — 99232 SBSQ HOSP IP/OBS MODERATE 35: CPT | Performed by: INTERNAL MEDICINE

## 2022-07-12 PROCEDURE — 82948 REAGENT STRIP/BLOOD GLUCOSE: CPT

## 2022-07-12 PROCEDURE — 99024 POSTOP FOLLOW-UP VISIT: CPT | Performed by: SURGERY

## 2022-07-12 RX ADMIN — POLYETHYLENE GLYCOL 3350 17 G: 17 POWDER, FOR SOLUTION ORAL at 09:28

## 2022-07-12 RX ADMIN — BUDESONIDE AND FORMOTEROL FUMARATE DIHYDRATE 2 PUFF: 160; 4.5 AEROSOL RESPIRATORY (INHALATION) at 19:59

## 2022-07-12 RX ADMIN — HEPARIN SODIUM 26 UNITS/KG/HR: 10000 INJECTION, SOLUTION INTRAVENOUS at 04:46

## 2022-07-12 RX ADMIN — GABAPENTIN 300 MG: 250 SOLUTION ORAL at 17:11

## 2022-07-12 RX ADMIN — METOCLOPRAMIDE HYDROCHLORIDE 10 MG: 5 INJECTION INTRAMUSCULAR; INTRAVENOUS at 17:16

## 2022-07-12 RX ADMIN — LEVALBUTEROL HYDROCHLORIDE 1.25 MG: 1.25 SOLUTION, CONCENTRATE RESPIRATORY (INHALATION) at 19:56

## 2022-07-12 RX ADMIN — SODIUM CHLORIDE TAB 1 GM 2 G: 1 TAB at 17:12

## 2022-07-12 RX ADMIN — DIGOXIN 250 MCG: 250 TABLET ORAL at 09:28

## 2022-07-12 RX ADMIN — QUETIAPINE FUMARATE 50 MG: 25 TABLET ORAL at 22:34

## 2022-07-12 RX ADMIN — OXYCODONE HYDROCHLORIDE 10 MG: 5 SOLUTION ORAL at 17:12

## 2022-07-12 RX ADMIN — METHOCARBAMOL TABLETS 750 MG: 750 TABLET, COATED ORAL at 17:16

## 2022-07-12 RX ADMIN — SODIUM CHLORIDE TAB 1 GM 2 G: 1 TAB at 09:28

## 2022-07-12 RX ADMIN — METOPROLOL TARTRATE 25 MG: 25 TABLET, FILM COATED ORAL at 22:33

## 2022-07-12 RX ADMIN — METOCLOPRAMIDE HYDROCHLORIDE 10 MG: 5 INJECTION INTRAMUSCULAR; INTRAVENOUS at 12:08

## 2022-07-12 RX ADMIN — INSULIN LISPRO 1 UNITS: 100 INJECTION, SOLUTION INTRAVENOUS; SUBCUTANEOUS at 17:23

## 2022-07-12 RX ADMIN — OXYCODONE HYDROCHLORIDE 10 MG: 5 SOLUTION ORAL at 09:27

## 2022-07-12 RX ADMIN — OXYCODONE HYDROCHLORIDE 10 MG: 5 SOLUTION ORAL at 22:33

## 2022-07-12 RX ADMIN — HEPARIN SODIUM 24 UNITS/KG/HR: 10000 INJECTION, SOLUTION INTRAVENOUS at 12:58

## 2022-07-12 RX ADMIN — METOPROLOL TARTRATE 25 MG: 25 TABLET, FILM COATED ORAL at 17:13

## 2022-07-12 RX ADMIN — SODIUM CHLORIDE TAB 1 GM 2 G: 1 TAB at 22:34

## 2022-07-12 RX ADMIN — SENNOSIDES AND DOCUSATE SODIUM 1 TABLET: 8.6; 5 TABLET ORAL at 09:30

## 2022-07-12 RX ADMIN — METOCLOPRAMIDE HYDROCHLORIDE 10 MG: 5 INJECTION INTRAMUSCULAR; INTRAVENOUS at 22:36

## 2022-07-12 RX ADMIN — BUDESONIDE AND FORMOTEROL FUMARATE DIHYDRATE 2 PUFF: 160; 4.5 AEROSOL RESPIRATORY (INHALATION) at 10:23

## 2022-07-12 RX ADMIN — IPRATROPIUM BROMIDE 0.5 MG: 0.5 SOLUTION RESPIRATORY (INHALATION) at 19:56

## 2022-07-12 RX ADMIN — OXYCODONE HYDROCHLORIDE 10 MG: 5 SOLUTION ORAL at 04:44

## 2022-07-12 RX ADMIN — GABAPENTIN 300 MG: 250 SOLUTION ORAL at 09:38

## 2022-07-12 RX ADMIN — HEPARIN SODIUM 24 UNITS/KG/HR: 10000 INJECTION, SOLUTION INTRAVENOUS at 19:32

## 2022-07-12 RX ADMIN — LEVALBUTEROL HYDROCHLORIDE 1.25 MG: 1.25 SOLUTION, CONCENTRATE RESPIRATORY (INHALATION) at 08:29

## 2022-07-12 RX ADMIN — GABAPENTIN 300 MG: 250 SOLUTION ORAL at 22:36

## 2022-07-12 RX ADMIN — METHOCARBAMOL TABLETS 750 MG: 750 TABLET, COATED ORAL at 22:33

## 2022-07-12 RX ADMIN — ACETAMINOPHEN 975 MG: 325 TABLET, FILM COATED ORAL at 13:07

## 2022-07-12 RX ADMIN — IPRATROPIUM BROMIDE 0.5 MG: 0.5 SOLUTION RESPIRATORY (INHALATION) at 08:29

## 2022-07-12 RX ADMIN — METOPROLOL TARTRATE 25 MG: 25 TABLET, FILM COATED ORAL at 09:28

## 2022-07-12 NOTE — PLAN OF CARE
Problem: Prexisting or High Potential for Compromised Skin Integrity  Goal: Skin integrity is maintained or improved  Description: INTERVENTIONS:  - Identify patients at risk for skin breakdown  - Assess and monitor skin integrity  - Assess and monitor nutrition and hydration status  - Monitor labs   - Assess for incontinence   - Turn and reposition patient  - Assist with mobility/ambulation  - Relieve pressure over bony prominences  - Avoid friction and shearing  - Provide appropriate hygiene as needed including keeping skin clean and dry  - Evaluate need for skin moisturizer/barrier cream  - Collaborate with interdisciplinary team   - Patient/family teaching  - Consider wound care consult   Outcome: Progressing     Problem: Potential for Falls  Goal: Patient will remain free of falls  Description: INTERVENTIONS:  - Educate patient/family on patient safety including physical limitations  - Instruct patient to call for assistance with activity   - Consult OT/PT to assist with strengthening/mobility   - Keep Call bell within reach  - Keep bed low and locked with side rails adjusted as appropriate  - Keep care items and personal belongings within reach  - Initiate and maintain comfort rounds  - Make Fall Risk Sign visible to staff  - Offer Toileting every 1 Hours, in advance of need  - Initiate/Maintain alarm  - Obtain necessary fall risk management equipment  - Apply yellow socks and bracelet for high fall risk patients  - Consider moving patient to room near nurses station  Outcome: Progressing     Problem: MOBILITY - ADULT  Goal: Maintain or return to baseline ADL function  Description: INTERVENTIONS:  -  Assess patient's ability to carry out ADLs; assess patient's baseline for ADL function and identify physical deficits which impact ability to perform ADLs (bathing, care of mouth/teeth, toileting, grooming, dressing, etc )  - Assess/evaluate cause of self-care deficits   - Assess range of motion  - Assess patient's mobility; develop plan if impaired  - Assess patient's need for assistive devices and provide as appropriate  - Encourage maximum independence but intervene and supervise when necessary  - Involve family in performance of ADLs  - Assess for home care needs following discharge   - Consider OT consult to assist with ADL evaluation and planning for discharge  - Provide patient education as appropriate  Outcome: Progressing  Goal: Maintains/Returns to pre admission functional level  Description: INTERVENTIONS:  - Perform BMAT or MOVE assessment daily    - Set and communicate daily mobility goal to care team and patient/family/caregiver  - Collaborate with rehabilitation services on mobility goals if consulted  - Perform Range of Motion 3 times a day  - Reposition patient every 2 hours  - Dangle patient 3 times a day  - Stand patient 3 times a day  - Ambulate patient 3 times a day  - Out of bed to chair 3 times a day   - Out of bed for meals 3 times a day  - Out of bed for toileting  - Record patient progress and toleration of activity level   Outcome: Progressing     Problem: Nutrition/Hydration-ADULT  Goal: Nutrient/Hydration intake appropriate for improving, restoring or maintaining nutritional needs  Description: Monitor and assess patient's nutrition/hydration status for malnutrition  Collaborate with interdisciplinary team and initiate plan and interventions as ordered  Monitor patient's weight and dietary intake as ordered or per policy  Utilize nutrition screening tool and intervene as necessary  Determine patient's food preferences and provide high-protein, high-caloric foods as appropriate       INTERVENTIONS:  - Monitor oral intake, urinary output, labs, and treatment plans  - Assess nutrition and hydration status and recommend course of action  - Evaluate amount of meals eaten  - Assist patient with eating if necessary   - Allow adequate time for meals  - Recommend/ encourage appropriate diets, oral nutritional supplements, and vitamin/mineral supplements  - Order, calculate, and assess calorie counts as needed  - Recommend, monitor, and adjust tube feedings and TPN/PPN based on assessed needs  - Assess need for intravenous fluids  - Provide specific nutrition/hydration education as appropriate  - Include patient/family/caregiver in decisions related to nutrition  Outcome: Progressing     Problem: PAIN - ADULT  Goal: Verbalizes/displays adequate comfort level or baseline comfort level  Description: Interventions:  - Encourage patient to monitor pain and request assistance  - Assess pain using appropriate pain scale  - Administer analgesics based on type and severity of pain and evaluate response  - Implement non-pharmacological measures as appropriate and evaluate response  - Consider cultural and social influences on pain and pain management  - Notify physician/advanced practitioner if interventions unsuccessful or patient reports new pain  Outcome: Progressing     Problem: INFECTION - ADULT  Goal: Absence or prevention of progression during hospitalization  Description: INTERVENTIONS:  - Assess and monitor for signs and symptoms of infection  - Monitor lab/diagnostic results  - Monitor all insertion sites, i e  indwelling lines, tubes, and drains  - Monitor endotracheal if appropriate and nasal secretions for changes in amount and color  - [place] appropriate cooling/warming therapies per order  - Administer medications as ordered  - Instruct and encourage patient and family to use good hand hygiene technique  - Identify and instruct in appropriate isolation precautions for identified infection/condition  Outcome: Progressing     Problem: DISCHARGE PLANNING  Goal: Discharge to home or other facility with appropriate resources  Description: INTERVENTIONS:  - Identify barriers to discharge w/patient and caregiver  - Arrange for needed discharge resources and transportation as appropriate  - Identify discharge learning needs (meds, wound care, etc )  - Arrange for interpretive services to assist at discharge as needed  - Refer to Case Management Department for coordinating discharge planning if the patient needs post-hospital services based on physician/advanced practitioner order or complex needs related to functional status, cognitive ability, or social support system  Outcome: Progressing     Problem: Knowledge Deficit  Goal: Patient/family/caregiver demonstrates understanding of disease process, treatment plan, medications, and discharge instructions  Description: Complete learning assessment and assess knowledge base    Interventions:  - Provide teaching at level of understanding  - Provide teaching via preferred learning methods  Outcome: Progressing     Problem: CARDIOVASCULAR - ADULT  Goal: Maintains optimal cardiac output and hemodynamic stability  Description: INTERVENTIONS:  - Monitor I/O, vital signs and rhythm  - Monitor for S/S and trends of decreased cardiac output  - Administer and titrate ordered vasoactive medications to optimize hemodynamic stability  - Assess quality of pulses, skin color and temperature  - Assess for signs of decreased coronary artery perfusion  - Instruct patient to report change in severity of symptoms  Outcome: Progressing  Goal: Absence of cardiac dysrhythmias or at baseline rhythm  Description: INTERVENTIONS:  - Continuous cardiac monitoring, vital signs, obtain 12 lead EKG if ordered  - Administer antiarrhythmic and heart rate control medications as ordered  - Monitor electrolytes and administer replacement therapy as ordered  Outcome: Progressing     Problem: RESPIRATORY - ADULT  Goal: Achieves optimal ventilation and oxygenation  Description: INTERVENTIONS:  - Assess for changes in respiratory status  - Assess for changes in mentation and behavior  - Position to facilitate oxygenation and minimize respiratory effort  - Oxygen administered by appropriate delivery if ordered  - Initiate smoking cessation education as indicated  - Encourage broncho-pulmonary hygiene including cough, deep breathe, Incentive Spirometry  - Assess the need for suctioning and aspirate as needed  - Assess and instruct to report SOB or any respiratory difficulty  - Respiratory Therapy support as indicated  Outcome: Progressing     Problem: GASTROINTESTINAL - ADULT  Goal: Minimal or absence of nausea and/or vomiting  Description: INTERVENTIONS:  - Administer IV fluids if ordered to ensure adequate hydration  - Maintain NPO status until nausea and vomiting are resolved  - Nasogastric tube if ordered  - Administer ordered antiemetic medications as needed  - Provide nonpharmacologic comfort measures as appropriate  - Advance diet as tolerated, if ordered  - Consider nutrition services referral to assist patient with adequate nutrition and appropriate food choices  Outcome: Progressing  Goal: Maintains or returns to baseline bowel function  Description: INTERVENTIONS:  - Assess bowel function  - Encourage oral fluids to ensure adequate hydration  - Administer IV fluids if ordered to ensure adequate hydration  - Administer ordered medications as needed  - Encourage mobilization and activity  - Consider nutritional services referral to assist patient with adequate nutrition and appropriate food choices  Outcome: Progressing  Goal: Maintains adequate nutritional intake  Description: INTERVENTIONS:  - Monitor percentage of each meal consumed  - Identify factors contributing to decreased intake, treat as appropriate  - Assist with meals as needed  - Monitor I&O, weight, and lab values if indicated  - Obtain nutrition services referral as needed  Outcome: Progressing  Goal: Establish and maintain optimal ostomy function  Description: INTERVENTIONS:  - Assess bowel function  - Encourage oral fluids to ensure adequate hydration  - Administer IV fluids if ordered to ensure adequate hydration   - Administer ordered medications as needed  - Encourage mobilization and activity  - Nutrition services referral to assist patient with appropriate food choices  - Assess stoma site  - Consider wound care consult   Outcome: Progressing  Goal: Oral mucous membranes remain intact  Description: INTERVENTIONS  - Assess oral mucosa and hygiene practices  - Implement preventative oral hygiene regimen  - Implement oral medicated treatments as ordered  - Initiate Nutrition services referral as needed  Outcome: Progressing     Problem: GENITOURINARY - ADULT  Goal: Maintains or returns to baseline urinary function  Description: INTERVENTIONS:  - Assess urinary function  - Encourage oral fluids to ensure adequate hydration if ordered  - Administer IV fluids as ordered to ensure adequate hydration  - Administer ordered medications as needed  - Offer frequent toileting  - Follow urinary retention protocol if ordered  Outcome: Progressing  Goal: Absence of urinary retention  Description: INTERVENTIONS:  - Assess patients ability to void and empty bladder  - Monitor I/O  - Bladder scan as needed  - Discuss with physician/AP medications to alleviate retention as needed  - Discuss catheterization for long term situations as appropriate  Outcome: Progressing  Goal: Urinary catheter remains patent  Description: INTERVENTIONS:  - Assess patency of urinary catheter  - If patient has a chronic carmen, consider changing catheter if non-functioning  - Follow guidelines for intermittent irrigation of non-functioning urinary catheter  Outcome: Progressing     Problem: SKIN/TISSUE INTEGRITY - ADULT  Goal: Skin Integrity remains intact(Skin Breakdown Prevention)  Description: Assess:  -Perform Arnoldo assessment every shift  -Clean and moisturize skin every day  -Inspect skin when repositioning, toileting, and assisting with ADLS  -Assess extremities for adequate circulation and sensation     Bed Management:  -Have minimal linens on bed & keep smooth, unwrinkled  -Change linens as needed when moist or perspiring  -Avoid sitting or lying in one position for more than 2 hours while in bed  -Keep HOB at [address]:  -Offer bedside commode  -Assess for incontinence every   -Use incontinent care products after each incontinent episode such as foam    Activity:  -Mobilize patient 3 times a day  -Encourage activity and walks on unit  -Encourage or provide ROM exercises   -Turn and reposition patient every 2 Hours  -Use appropriate equipment to lift or move patient in bed  -Instruct/ Assist with weight shifting every 2 when out of bed in chair  -Consider limitation of chair time 1 hour intervals    Skin Care:  -Avoid use of baby powder, tape, friction and shearing, hot water or constrictive clothing  -Relieve pressure over bony prominences using wedges  -Do not massage red bony areas      Outcome: Progressing  Goal: Incision(s), wounds(s) or drain site(s) healing without S/S of infection  Description: INTERVENTIONS  - Assess and document dressing, incision, wound bed, drain sites and surrounding tissue  - Provide patient and family education  - Perform skin care/dressing changes every day  Outcome: Progressing  Goal: Pressure injury heals and does not worsen  Description: Interventions:  - Implement low air loss mattress or specialty surface (Criteria met)  - Apply silicone foam dressing  - Instruct/assist with weight shifting every 30 minutes when in chair   - Limit chair time to 1 hour intervals  - Use special pressure reducing interventions such as waffle when in chair   - Apply fecal or urinary incontinence containment device   - Perform passive or active ROM every day  - Turn and reposition patient & offload bony prominences every 2 hours   - Utilize friction reducing device or surface for transfers   - Consider consults to  interdisciplinary teams such as   - Use incontinent care products after each incontinent episode such as   - Consider nutrition services referral as needed  Outcome: Progressing

## 2022-07-12 NOTE — QUICK NOTE
General Surgery Quick Note    Dressing Change    The patient was laid supine  Dressing of adaptic and abd pads was carefully removed, leaving graft in place  Photo was taken, included below  A new dressing was placed with adaptic covering the entirety of the wound, with abd pads overlying and secured with tape  The wound right of midline was also packed with packing tape and secured under the abd dressing  The dressing was fully saturated, warranting more frequent dressing changes  The patient would benefit from BID changes  Dressing change was performed with Dr Kathleen Gunter

## 2022-07-12 NOTE — PROGRESS NOTES
NEPHROLOGY PROGRESS NOTE   Randell King 47 y o  male MRN: [id number]  Unit/Bed#: [facility] 607-01 Encounter: [id number]      ASSESSMENT/PLAN:  1  Hyponatremia:  Possibly related to volume depletion as evidenced by low urine sodium +/- Seroquel and PPI   Sodium reached a low of 127 and now has been improving up to 131 today  · Diuretics have been on hold  · Initially had trial of IV fluids   · Currently on sodium chloride 2 g t i d  · Workup:  Serum osmolality 281, urine osmolality 297, urine sodium 5, A m  Cortisol 18 6, TSH elevated at 6 6 but T4 normal  2  Small-bowel obstruction and ventral hernia:  Status post laparotomy with lysis of adhesions and VAC placement 6/14  Now status post skin graft, I&D washout and VAC placement  3  Hypotension:  Blood pressure improved  Torsemide and spironolactone on hold  4  Anemia:  Hemoglobin relatively stable at 7 6 today    Plan Summary:    Continue sodium chloride 2 g t i d    Check a m  BMP   Working on discharge per surgery note  Cleared for discharge from Nephrology standpoint but would recommend repeating BMP in 1 week    SUBJECTIVE:  Feeling well  Says he is eating and drinking well but appetite is not back to normal   No nausea, vomiting or diarrhea  Denies chest pain or shortness of breath      OBJECTIVE:  Current Weight: Weight - Scale: (!) 227 kg (500 lb 14 2 oz)  Vitals:    07/12/22 0830   BP:    Pulse:    Resp:    Temp:    SpO2: 95%     General:  appears comfortable and in no acute distress, morbidly obese    Skin:  No rash, warm, good skin turgor   Eyes:  Sclerae anicteric, no periorbital edema   ENT:  Moist mucous membranes  Neck:  Trachea midline, symmetric   Chest:  Clear to auscultation bilaterally with no wheezes, rales or rhonchi  CVS:  Regular rate and rhythm  Abdomen:  Soft, nontender, nondistended  Neuro:  Awake and alert  Psych:  Appropriate affect  Extremities: no lower extremity edema       Medications:  Scheduled Meds:  Current Facility-Administered Medications   Medication Dose Route Frequency Provider Last Rate    acetaminophen  975 mg Oral Q8H [address] Marshalallen Strong, DO      benzocaine  2 spray Mucosal Once [employer], DO      bisacodyl  10 mg Rectal Daily Judah Strong, DO      budesonide-formoterol  2 puff Inhalation BID [employer], DO      digoxin  250 mcg Oral Daily Shayan Barry MD      gabapentin  300 mg Oral TID H[employer], DO      heparin (porcine)  3-30 Units/kg/hr (Order-Specific) Intravenous Titrated Judah Strong DO 24 Units/kg/hr (07/12/22 3691)    heparin (porcine)  10,000 Units Intravenous Q1H PRN Judah Strong, DO      heparin (porcine)  5,000 Units Intravenous Q1H PRN [employer], DO      HYDROmorphone  1 mg Intravenous Q6H PRN Judah Strong, DO      insulin lispro  1-5 Units Subcutaneous 4x Daily (AC & HS) Debborah Lundborg, MD      ipratropium  0 5 mg Nebulization BID Debborah Lundborg, MD      levalbuterol  1 25 mg Nebulization BID Debborah Lundborg, MD      levothyroxine  25 mcg Oral Early Morning [employer], DO      lidocaine  1 application Urethral Once [employer], DO      methocarbamol  750 mg Oral Q6H [address] Judah Strong, DO      metoclopramide  10 mg Intravenous Q6H [address] Judah Strong, DO      metoprolol tartrate  25 mg Oral Q8H Shayan Barry MD      ondansetron  4 mg Intravenous Q4H PRN [employer], DO      oxyCODONE  10 mg Oral Q4H PRN [employer], DO      oxyCODONE  5 mg Oral Q4H PRN [employer], DO      pantoprazole  20 mg Oral Early Morning Hintsoft, DO      polyethylene glycol  17 g Oral Daily Judah Strong, DO      QUEtiapine  50 mg Oral HS Hint[employer], DO      senna-docusate sodium  1 tablet Oral BID Judah Strong, DO      sodium chloride  2 g Oral TID Glory Fallen, DO         PRN Meds:   heparin (porcine)    heparin (porcine)    HYDROmorphone    ondansetron    oxyCODONE    oxyCODONE    Continuous Infusions:heparin (porcine), 3-30 Units/kg/hr (Order-Specific), Last Rate: 24 Units/kg/hr (07/12/22 9910)        Laboratory Results:  Results from last 7 days   Lab Units 07/12/22  0454 07/11/22  0458 07/10/22  0605 07/10/22  0533 07/09/22  0626 07/08/22  0530 07/07/22  1534 07/07/22  0558 07/06/22  0250   WBC Thousand/uL 5 90 6 50 5 38  --  6 09 5 27  --  5 55 4 83   HEMOGLOBIN g/dL 7 6* 8 1* 7 8*  --  7 7* 8 0*  --  7 9* 7 9*   HEMATOCRIT % 25 7* 26 8* 25 6*  --  24 9* 25 7*  --  26 5* 26 4*   PLATELETS Thousands/uL 209 212 209  --  224 233  --  219 207   SODIUM mmol/L 131* 130*  --  129* 130* 129* 127* 127*  --    POTASSIUM mmol/L 4 0 4 2  --  4 1 4 0 4 0 3 8 4 1  --    CHLORIDE mmol/L 94* 95*  --  93* 92* 91* 90* 90*  --    CO2 mmol/L 32 32  --  34* 34* 35* 33* 35*  --    BUN mg/dL 11 14  --  14 14 11 11 13  --    CREATININE mg/dL 0 60 0 61  --  0 63 0 68 0 58* 0 59* 0 60  --    CALCIUM mg/dL 8 7 9 2  --  9 1 8 8 9 1 9 0 9 0  --    MAGNESIUM mg/dL  --   --   --   --   --  2 3  --  2 1  --    PHOSPHORUS mg/dL  --   --   --   --   --  3 1  --  3 1  --

## 2022-07-12 NOTE — PROGRESS NOTES
Progress Note - General Surgery   Serina Mantilla 47 y o  male MRN: [id number]  Unit/Bed#: [facility] 607-01 Encounter: [id number]    Assessment:  53yo M with SBO and ventral hernia s/p attempted STSG, I&D, washout, and vac placement on 6/30 with second STSG on 7/5  Recovering well post-operatively and now meeting requirements for discharge  Plan:  - ready for discharge back to nursing home, needs transport arranged  - will discuss with cardiology re: home anticoagulation plan  - daily packing of abdominal wound with kerlix  - dressing changes to abdomen and LLE -- adaptic to wound beds, dry kerlix on top, abd covering  - nephro recs for hyponatremia: hold diuretics, cont metoprolol 25mg Q8h, trend BMP    Subjective:   NAEO  Tolerated left thigh and abdominal dressing changes without issue  Eating and drinking without issues  Urinating and passing flatus/BMs without issues  Objective:     Blood pressure 106/57, pulse 89, temperature 97 5 °F (36 4 °C), resp  rate 17, height 5' 11" (1 803 m), weight (!) 227 kg (500 lb 14 2 oz), SpO2 95 %  ,Body mass index is 69 86 kg/m²      No intake or output data in the 24 hours ending 07/11/22 2051    Invasive Devices  Report    Peripherally Inserted Central Catheter Line  Duration           PICC Line 88/26/09 Right Basilic 28 days                Physical Exam:  General: NAD  Neuro: A&Ox3  HEENT: Normocephalic, atraumatic, moist mucus membranes  CV: regular rate  Lung: normal work of breathing  Abd: soft, non-distended, non-tender, 2cm x 2cm x 3cm wound packed with kerlix, skin graft site with healing tissue, overlaid with adaptic and kerlix  MSK: normal ROM  Extremities: LLE with clean dressing overlying donor site    Dayanna Frank MD  PGY-1, General Surgery

## 2022-07-12 NOTE — WOUND OSTOMY CARE
Progress Note - Wound   Real Julien 47 y o  male MRN: [id number]  Unit/Bed#: [facility] 550-12 Encounter: [id number]        Assessment:   Patient is seen for wound care follow-up  Patient admitted to [facility] for treatment of small bowel obstruction  Patient is in bariatric kreg bed on low air loss mattress, patient is max assist with turning and repositioning  Patient has external urinary catheter and is incontinent of bowel  Patient has a BMI of 69 86  Findings:  1  Posterior neck wound- full thickness dehisced sutured wound with beefy red, yellow/ sloughy wound bed  Morena-wound is fragile and pink  2 Sutures noted around back of neck  Small to Moderate serosanguineous drainage noted  Wound dressed per orders below     New dressing applied per orders listed below  Patient tolerated well- no s/s of non-verbal pain or discomfort observed during the encounter  Bedside nurse aware of plan of care  See flow sheets for more detailed assessment findings  Wound care will continue to follow          Orders listed below and wound care will continue to follow, call or tiger text with questions  Bedside nurse updated of findings and orders  Flowsheets below with pictures and measurements  Skin care plans:  1-Hydraguard to bilateral sacrum, buttock and heels BID and PRN  2-Elevate heels to offload pressure  3-Ehob cushion in chair when out of bed  4-Moisturize skin daily with skin nourishing cream   5-Turn/reposition q2h or when medically stable for pressure re-distribution on skin  6-Irrigate wound to posterior neck with normal saline, apply silver alginate, cover with Allevyn foam  Change every other day and PRN soilage/displacement            Wound 06/15/22 Neck Posterior (Active)   Wound Image   07/12/22 1106   Wound Description Beefy red;Slough 07/12/22 1106   Morena-wound Assessment Fragile;[place] 07/12/22 1106   Wound Length (cm) 0 6 cm 07/12/22 1106   Wound Width (cm) 1 cm 07/12/22 1106   Wound Depth (cm) 0 5 cm 07/12/22 1106   Wound Surface Area (cm^2) 0 6 cm^2 07/12/22 1106   Wound Volume (cm^3) 0 3 cm^3 07/12/22 1106   Calculated Wound Volume (cm^3) 0 3 cm^3 07/12/22 1106   Change in Wound Size % 50 07/12/22 1106   Tunneling 0 cm 07/12/22 1106   Undermining 0 07/12/22 1106   Wound Site Closure Sutures 07/12/22 1106   Drainage Amount Small 07/12/22 1106   Drainage Description Serosanguineous 07/12/22 1106   Non-staged Wound Description Full thickness 07/12/22 1106   Treatments Cleansed;Irrigation with NSS 07/12/22 1106   Dressing Calcium Alginate with Silver; Foam, Silicon (eg  Allevyn, etc) 07/12/22 1106   Wound packed? No 07/12/22 1106   Dressing Changed Changed 07/12/22 1106   Patient Tolerance Tolerated well 07/12/22 1106   Dressing Status Clean;Dry; Intact 07/12/22 1106        Patient assessed and seen with Hortencia Correia RN, BSN, [employer] Meliton Homans RN, BSN

## 2022-07-13 LAB
ANION GAP SERPL CALCULATED.3IONS-SCNC: 2 MMOL/L (ref 4–13)
APTT PPP: 57 SECONDS (ref 23–37)
APTT PPP: 64 SECONDS (ref 23–37)
APTT PPP: 90 SECONDS (ref 23–37)
ARTERIAL PATENCY WRIST A: YES
BASE EXCESS BLDA CALC-SCNC: 2.6 MMOL/L
BASOPHILS # BLD AUTO: 0.04 THOUSANDS/ΜL (ref 0–0.1)
BASOPHILS NFR BLD AUTO: 1 % (ref 0–1)
BUN SERPL-MCNC: 7 MG/DL (ref 5–25)
CALCIUM SERPL-MCNC: 8.7 MG/DL (ref 8.3–10.1)
CHLORIDE SERPL-SCNC: 95 MMOL/L (ref 100–108)
CO2 SERPL-SCNC: 33 MMOL/L (ref 21–32)
CREAT SERPL-MCNC: 0.58 MG/DL (ref 0.6–1.3)
EOSINOPHIL # BLD AUTO: 0.06 THOUSAND/ΜL (ref 0–0.61)
EOSINOPHIL NFR BLD AUTO: 1 % (ref 0–6)
ERYTHROCYTE [DISTWIDTH] IN BLOOD BY AUTOMATED COUNT: 16.9 % (ref 11.6–15.1)
GFR SERPL CREATININE-BSD FRML MDRD: 118 ML/MIN/1.73SQ M
GLUCOSE SERPL-MCNC: 113 MG/DL (ref 65–140)
GLUCOSE SERPL-MCNC: 120 MG/DL (ref 65–140)
GLUCOSE SERPL-MCNC: 123 MG/DL (ref 65–140)
GLUCOSE SERPL-MCNC: 139 MG/DL (ref 65–140)
GLUCOSE SERPL-MCNC: 141 MG/DL (ref 65–140)
HCO3 BLDA-SCNC: 28.3 MMOL/L (ref 22–28)
HCT VFR BLD AUTO: 26.2 % (ref 36.5–49.3)
HGB BLD-MCNC: 7.8 G/DL (ref 12–17)
IMM GRANULOCYTES # BLD AUTO: 0.11 THOUSAND/UL (ref 0–0.2)
IMM GRANULOCYTES NFR BLD AUTO: 2 % (ref 0–2)
LYMPHOCYTES # BLD AUTO: 1.68 THOUSANDS/ΜL (ref 0.6–4.47)
LYMPHOCYTES NFR BLD AUTO: 36 % (ref 14–44)
MCH RBC QN AUTO: 28.6 PG (ref 26.8–34.3)
MCHC RBC AUTO-ENTMCNC: 29.8 G/DL (ref 31.4–37.4)
MCV RBC AUTO: 96 FL (ref 82–98)
MONOCYTES # BLD AUTO: 0.69 THOUSAND/ΜL (ref 0.17–1.22)
MONOCYTES NFR BLD AUTO: 15 % (ref 4–12)
NEUTROPHILS # BLD AUTO: 2.11 THOUSANDS/ΜL (ref 1.85–7.62)
NEUTS SEG NFR BLD AUTO: 45 % (ref 43–75)
NRBC BLD AUTO-RTO: 1 /100 WBCS
O2 CT BLDA-SCNC: 11.4 ML/DL (ref 16–23)
OTHER FIO2: ABNORMAL %
OXYHGB MFR BLDA: 95.8 % (ref 94–97)
PCO2 BLDA: 50.1 MM HG (ref 36–44)
PH BLDA: 7.37 [PH] (ref 7.35–7.45)
PLATELET # BLD AUTO: 181 THOUSANDS/UL (ref 149–390)
PMV BLD AUTO: 10.2 FL (ref 8.9–12.7)
PO2 BLDA: 101.2 MM HG (ref 75–129)
POTASSIUM SERPL-SCNC: 4.4 MMOL/L (ref 3.5–5.3)
RBC # BLD AUTO: 2.73 MILLION/UL (ref 3.88–5.62)
SODIUM SERPL-SCNC: 130 MMOL/L (ref 136–145)
SPECIMEN SOURCE: ABNORMAL
WBC # BLD AUTO: 4.69 THOUSAND/UL (ref 4.31–10.16)

## 2022-07-13 PROCEDURE — 85025 COMPLETE CBC W/AUTO DIFF WBC: CPT | Performed by: SURGERY

## 2022-07-13 PROCEDURE — 99024 POSTOP FOLLOW-UP VISIT: CPT | Performed by: SURGERY

## 2022-07-13 PROCEDURE — 85730 THROMBOPLASTIN TIME PARTIAL: CPT | Performed by: SURGERY

## 2022-07-13 PROCEDURE — 94760 N-INVAS EAR/PLS OXIMETRY 1: CPT

## 2022-07-13 PROCEDURE — 94640 AIRWAY INHALATION TREATMENT: CPT

## 2022-07-13 PROCEDURE — 36600 WITHDRAWAL OF ARTERIAL BLOOD: CPT

## 2022-07-13 PROCEDURE — 82948 REAGENT STRIP/BLOOD GLUCOSE: CPT

## 2022-07-13 PROCEDURE — 80048 BASIC METABOLIC PNL TOTAL CA: CPT | Performed by: SURGERY

## 2022-07-13 PROCEDURE — 82805 BLOOD GASES W/O2 SATURATION: CPT | Performed by: PHYSICIAN ASSISTANT

## 2022-07-13 RX ADMIN — METHOCARBAMOL TABLETS 750 MG: 750 TABLET, COATED ORAL at 06:37

## 2022-07-13 RX ADMIN — SODIUM CHLORIDE TAB 1 GM 2 G: 1 TAB at 22:50

## 2022-07-13 RX ADMIN — HEPARIN SODIUM 5000 UNITS: 1000 INJECTION INTRAVENOUS; SUBCUTANEOUS at 11:08

## 2022-07-13 RX ADMIN — METOPROLOL TARTRATE 25 MG: 25 TABLET, FILM COATED ORAL at 15:55

## 2022-07-13 RX ADMIN — DIGOXIN 250 MCG: 250 TABLET ORAL at 08:13

## 2022-07-13 RX ADMIN — SODIUM CHLORIDE TAB 1 GM 2 G: 1 TAB at 08:17

## 2022-07-13 RX ADMIN — GABAPENTIN 300 MG: 250 SOLUTION ORAL at 22:59

## 2022-07-13 RX ADMIN — QUETIAPINE FUMARATE 50 MG: 25 TABLET ORAL at 22:56

## 2022-07-13 RX ADMIN — METOPROLOL TARTRATE 25 MG: 25 TABLET, FILM COATED ORAL at 22:51

## 2022-07-13 RX ADMIN — SODIUM CHLORIDE TAB 1 GM 2 G: 1 TAB at 15:55

## 2022-07-13 RX ADMIN — HEPARIN SODIUM 22 UNITS/KG/HR: 10000 INJECTION, SOLUTION INTRAVENOUS at 10:26

## 2022-07-13 RX ADMIN — IPRATROPIUM BROMIDE 0.5 MG: 0.5 SOLUTION RESPIRATORY (INHALATION) at 19:49

## 2022-07-13 RX ADMIN — BUDESONIDE AND FORMOTEROL FUMARATE DIHYDRATE 2 PUFF: 160; 4.5 AEROSOL RESPIRATORY (INHALATION) at 22:41

## 2022-07-13 RX ADMIN — OXYCODONE HYDROCHLORIDE 10 MG: 5 SOLUTION ORAL at 22:54

## 2022-07-13 RX ADMIN — GABAPENTIN 300 MG: 250 SOLUTION ORAL at 08:33

## 2022-07-13 RX ADMIN — LEVALBUTEROL HYDROCHLORIDE 1.25 MG: 1.25 SOLUTION, CONCENTRATE RESPIRATORY (INHALATION) at 19:49

## 2022-07-13 RX ADMIN — LEVOTHYROXINE SODIUM 25 MCG: 25 TABLET ORAL at 06:37

## 2022-07-13 RX ADMIN — PANTOPRAZOLE SODIUM 20 MG: 20 TABLET, DELAYED RELEASE ORAL at 06:37

## 2022-07-13 RX ADMIN — HEPARIN SODIUM 24 UNITS/KG/HR: 10000 INJECTION, SOLUTION INTRAVENOUS at 18:22

## 2022-07-13 RX ADMIN — METOCLOPRAMIDE HYDROCHLORIDE 10 MG: 5 INJECTION INTRAMUSCULAR; INTRAVENOUS at 11:07

## 2022-07-13 RX ADMIN — BUDESONIDE AND FORMOTEROL FUMARATE DIHYDRATE 2 PUFF: 160; 4.5 AEROSOL RESPIRATORY (INHALATION) at 08:30

## 2022-07-13 RX ADMIN — SENNOSIDES AND DOCUSATE SODIUM 1 TABLET: 8.6; 5 TABLET ORAL at 08:14

## 2022-07-13 RX ADMIN — HYDROMORPHONE HYDROCHLORIDE 1 MG: 1 INJECTION, SOLUTION INTRAMUSCULAR; INTRAVENOUS; SUBCUTANEOUS at 15:54

## 2022-07-13 RX ADMIN — ACETAMINOPHEN 975 MG: 325 TABLET, FILM COATED ORAL at 14:41

## 2022-07-13 RX ADMIN — METOPROLOL TARTRATE 25 MG: 25 TABLET, FILM COATED ORAL at 08:16

## 2022-07-13 RX ADMIN — GABAPENTIN 300 MG: 250 SOLUTION ORAL at 15:55

## 2022-07-13 RX ADMIN — OXYCODONE HYDROCHLORIDE 10 MG: 5 SOLUTION ORAL at 06:41

## 2022-07-13 RX ADMIN — OXYCODONE HYDROCHLORIDE 5 MG: 5 SOLUTION ORAL at 14:44

## 2022-07-13 RX ADMIN — METOCLOPRAMIDE HYDROCHLORIDE 10 MG: 5 INJECTION INTRAMUSCULAR; INTRAVENOUS at 06:37

## 2022-07-13 RX ADMIN — METOCLOPRAMIDE HYDROCHLORIDE 10 MG: 5 INJECTION INTRAMUSCULAR; INTRAVENOUS at 17:11

## 2022-07-13 RX ADMIN — METHOCARBAMOL TABLETS 750 MG: 750 TABLET, COATED ORAL at 17:11

## 2022-07-13 RX ADMIN — METOCLOPRAMIDE HYDROCHLORIDE 10 MG: 5 INJECTION INTRAMUSCULAR; INTRAVENOUS at 22:55

## 2022-07-13 RX ADMIN — METHOCARBAMOL TABLETS 750 MG: 750 TABLET, COATED ORAL at 22:50

## 2022-07-13 NOTE — QUICK NOTE
sNa remains stable 130-131 with normal renal function  Jatinder Stratton for d/c from renal perspective  Continue salt tabs 2g TID  Repeat BMP in 1 week and follow up with primary care  Renal will sign off  Call/text with questions

## 2022-07-13 NOTE — PROGRESS NOTES
Progress Note - General Surgery   Karan Hannah 47 y o  male MRN: [id number]  Unit/Bed#: [facility] 049-80 Encounter: [id number]    Assessment:  Patient is a 47 y o  male  now status post STSG/POD8  Afebrile,VSS on 2L NC  Tachypnic 26 @ 2AM  + flatus/BM, no N/V  Abdomen soft, appropriately tender, nondistended  Midabdomen covered in clean dressing  LLE dressing serous drainage noted, appropriately tender  UOP: 1400mL  WBC:4 69, from 5 90  HB 8 from 7 6  Chronically hyponatremic 130  Creatinine 0 58 from 0 60      Plan:  Cleared by nephro, recommend repeat BMP in 1 week  Possible D/C today or tomorrow  Continue BID dressing changes to LLE only  Continue diet as tolerated  Encourage out of bed and ambulation, IS  Prn Pain control  Maintain wound sites  DVT px heparin drip    Subjective/Objective     Subjective:   No acute events overnight  Events as noted above  Objective:    Blood pressure 110/55, pulse 80, temperature 99 °F (37 2 °C), resp  rate (!) 26, height 5' 11" (1 803 m), weight (!) 227 kg (500 lb 14 2 oz), SpO2 100 %  ,Body mass index is 69 86 kg/m²  Intake/Output Summary (Last 24 hours) at 2022 0606  Last data filed at 2022 0001  Gross per 24 hour   Intake 559 13 ml   Output 1400 ml   Net -840 87 ml       Invasive Devices  Report    Peripherally Inserted Central Catheter Line  Duration           PICC Line 34/ Right Basilic 29 days                Physical Exam  Vitals and nursing note reviewed  Constitutional:       General: He is not in acute distress  Appearance: He is obese  He is not ill-appearing, toxic-appearing or diaphoretic  HENT:      Head: Normocephalic and atraumatic  Right Ear: External ear normal       Left Ear: External ear normal       Nose: Nose normal       Comments: NC inserted       Mouth/Throat:      Mouth: Mucous membranes are moist    Eyes:      Extraocular Movements: Extraocular movements intact        Conjunctiva/sclera: Conjunctivae normal  Cardiovascular:      Rate and Rhythm: Normal rate and regular rhythm  Pulses: Normal pulses  Heart sounds: Normal heart sounds  No murmur heard  No friction rub  No gallop  Pulmonary:      Effort: Pulmonary effort is normal  No respiratory distress  Breath sounds: Normal breath sounds  Abdominal:      General: There is no distension  Palpations: Abdomen is soft  Tenderness: There is abdominal tenderness (appropriately tender)  There is no guarding or rebound  Comments: Midabdominal dressing clean   Musculoskeletal:      Cervical back: Normal range of motion  Comments: LLE covered in dressing  Serous drainage noted  Appropriately tender   Skin:     General: Skin is warm and dry  Capillary Refill: Capillary refill takes less than 2 seconds  Neurological:      Mental Status: He is alert  Mental status is at baseline     Psychiatric:         Mood and Affect: Mood normal          Behavior: Behavior normal              Results from last 7 days   Lab Units 07/13/22  0315 07/12/22  0454 07/11/22  0458   WBC Thousand/uL 4 69 5 90 6 50   HEMOGLOBIN g/dL 7 8* 7 6* 8 1*   HEMATOCRIT % 26 2* 25 7* 26 8*   PLATELETS Thousands/uL 181 209 212     Results from last 7 days   Lab Units 07/13/22  0315 07/12/22  0454 07/11/22  0458   POTASSIUM mmol/L 4 4 4 0 4 2   CHLORIDE mmol/L 95* 94* 95*   CO2 mmol/L 33* 32 32   BUN mg/dL 7 11 14   CREATININE mg/dL 0 58* 0 60 0 61   CALCIUM mg/dL 8 7 8 7 9 2     Results from last 7 days   Lab Units 07/13/22  0323 07/12/22  1923 07/12/22  1255   PTT seconds 64* 109* [address]   General Surgery

## 2022-07-13 NOTE — PLAN OF CARE
Problem: Prexisting or High Potential for Compromised Skin Integrity  Goal: Skin integrity is maintained or improved  Description: INTERVENTIONS:  - Identify patients at risk for skin breakdown  - Assess and monitor skin integrity  - Assess and monitor nutrition and hydration status  - Monitor labs   - Assess for incontinence   - Turn and reposition patient  - Assist with mobility/ambulation  - Relieve pressure over bony prominences  - Avoid friction and shearing  - Provide appropriate hygiene as needed including keeping skin clean and dry  - Evaluate need for skin moisturizer/barrier cream  - Collaborate with interdisciplinary team   - Patient/family teaching  - Consider wound care consult   7/12/2022 2034 by Bryant Sarmiento, RN  Outcome: Not Progressing  7/12/2022 2034 by Bryant Sarmiento, RN  Outcome: Not Progressing

## 2022-07-13 NOTE — PLAN OF CARE
Problem: Prexisting or High Potential for Compromised Skin Integrity  Goal: Skin integrity is maintained or improved  Description: INTERVENTIONS:  - Identify patients at risk for skin breakdown  - Assess and monitor skin integrity  - Assess and monitor nutrition and hydration status  - Monitor labs   - Assess for incontinence   - Turn and reposition patient  - Assist with mobility/ambulation  - Relieve pressure over bony prominences  - Avoid friction and shearing  - Provide appropriate hygiene as needed including keeping skin clean and dry  - Evaluate need for skin moisturizer/barrier cream  - Collaborate with interdisciplinary team   - Patient/family teaching  - Consider wound care consult   Outcome: Progressing     Problem: Potential for Falls  Goal: Patient will remain free of falls  Description: INTERVENTIONS:  - Educate patient/family on patient safety including physical limitations  - Instruct patient to call for assistance with activity   - Consult OT/PT to assist with strengthening/mobility   - Keep Call bell within reach  - Keep bed low and locked with side rails adjusted as appropriate  - Keep care items and personal belongings within reach  - Initiate and maintain comfort rounds  - Make Fall Risk Sign visible to staff  - Offer Toileting every 1 Hours, in advance of need  - Initiate/Maintain alarm  - Obtain necessary fall risk management equipment  - Apply yellow socks and bracelet for high fall risk patients  - Consider moving patient to room near nurses station  Outcome: Progressing     Problem: MOBILITY - ADULT  Goal: Maintain or return to baseline ADL function  Description: INTERVENTIONS:  -  Assess patient's ability to carry out ADLs; assess patient's baseline for ADL function and identify physical deficits which impact ability to perform ADLs (bathing, care of mouth/teeth, toileting, grooming, dressing, etc )  - Assess/evaluate cause of self-care deficits   - Assess range of motion  - Assess patient's mobility; develop plan if impaired  - Assess patient's need for assistive devices and provide as appropriate  - Encourage maximum independence but intervene and supervise when necessary  - Involve family in performance of ADLs  - Assess for home care needs following discharge   - Consider OT consult to assist with ADL evaluation and planning for discharge  - Provide patient education as appropriate  Outcome: Progressing  Goal: Maintains/Returns to pre admission functional level  Description: INTERVENTIONS:  - Perform BMAT or MOVE assessment daily    - Set and communicate daily mobility goal to care team and patient/family/caregiver  - Collaborate with rehabilitation services on mobility goals if consulted  - Perform Range of Motion 3 times a day  - Reposition patient every 2 hours  - Dangle patient 3 times a day  - Stand patient 3 times a day  - Ambulate patient 3 times a day  - Out of bed to chair 3 times a day   - Out of bed for meals 3 times a day  - Out of bed for toileting  - Record patient progress and toleration of activity level   Outcome: Progressing     Problem: Nutrition/Hydration-ADULT  Goal: Nutrient/Hydration intake appropriate for improving, restoring or maintaining nutritional needs  Description: Monitor and assess patient's nutrition/hydration status for malnutrition  Collaborate with interdisciplinary team and initiate plan and interventions as ordered  Monitor patient's weight and dietary intake as ordered or per policy  Utilize nutrition screening tool and intervene as necessary  Determine patient's food preferences and provide high-protein, high-caloric foods as appropriate       INTERVENTIONS:  - Monitor oral intake, urinary output, labs, and treatment plans  - Assess nutrition and hydration status and recommend course of action  - Evaluate amount of meals eaten  - Assist patient with eating if necessary   - Allow adequate time for meals  - Recommend/ encourage appropriate diets, oral nutritional supplements, and vitamin/mineral supplements  - Order, calculate, and assess calorie counts as needed  - Recommend, monitor, and adjust tube feedings and TPN/PPN based on assessed needs  - Assess need for intravenous fluids  - Provide specific nutrition/hydration education as appropriate  - Include patient/family/caregiver in decisions related to nutrition  Outcome: Progressing     Problem: PAIN - ADULT  Goal: Verbalizes/displays adequate comfort level or baseline comfort level  Description: Interventions:  - Encourage patient to monitor pain and request assistance  - Assess pain using appropriate pain scale  - Administer analgesics based on type and severity of pain and evaluate response  - Implement non-pharmacological measures as appropriate and evaluate response  - Consider cultural and social influences on pain and pain management  - Notify physician/advanced practitioner if interventions unsuccessful or patient reports new pain  Outcome: Progressing     Problem: INFECTION - ADULT  Goal: Absence or prevention of progression during hospitalization  Description: INTERVENTIONS:  - Assess and monitor for signs and symptoms of infection  - Monitor lab/diagnostic results  - Monitor all insertion sites, i e  indwelling lines, tubes, and drains  - Monitor endotracheal if appropriate and nasal secretions for changes in amount and color  - Gordon appropriate cooling/warming therapies per order  - Administer medications as ordered  - Instruct and encourage patient and family to use good hand hygiene technique  - Identify and instruct in appropriate isolation precautions for identified infection/condition  Outcome: Progressing     Problem: DISCHARGE PLANNING  Goal: Discharge to home or other facility with appropriate resources  Description: INTERVENTIONS:  - Identify barriers to discharge w/patient and caregiver  - Arrange for needed discharge resources and transportation as appropriate  - Identify discharge learning needs (meds, wound care, etc )  - Arrange for interpretive services to assist at discharge as needed  - Refer to Case Management Department for coordinating discharge planning if the patient needs post-hospital services based on physician/advanced practitioner order or complex needs related to functional status, cognitive ability, or social support system  Outcome: Progressing     Problem: Knowledge Deficit  Goal: Patient/family/caregiver demonstrates understanding of disease process, treatment plan, medications, and discharge instructions  Description: Complete learning assessment and assess knowledge base    Interventions:  - Provide teaching at level of understanding  - Provide teaching via preferred learning methods  Outcome: Progressing     Problem: CARDIOVASCULAR - ADULT  Goal: Maintains optimal cardiac output and hemodynamic stability  Description: INTERVENTIONS:  - Monitor I/O, vital signs and rhythm  - Monitor for S/S and trends of decreased cardiac output  - Administer and titrate ordered vasoactive medications to optimize hemodynamic stability  - Assess quality of pulses, skin color and temperature  - Assess for signs of decreased coronary artery perfusion  - Instruct patient to report change in severity of symptoms  Outcome: Progressing  Goal: Absence of cardiac dysrhythmias or at baseline rhythm  Description: INTERVENTIONS:  - Continuous cardiac monitoring, vital signs, obtain 12 lead EKG if ordered  - Administer antiarrhythmic and heart rate control medications as ordered  - Monitor electrolytes and administer replacement therapy as ordered  Outcome: Progressing     Problem: RESPIRATORY - ADULT  Goal: Achieves optimal ventilation and oxygenation  Description: INTERVENTIONS:  - Assess for changes in respiratory status  - Assess for changes in mentation and behavior  - Position to facilitate oxygenation and minimize respiratory effort  - Oxygen administered by appropriate delivery if ordered  - Initiate smoking cessation education as indicated  - Encourage broncho-pulmonary hygiene including cough, deep breathe, Incentive Spirometry  - Assess the need for suctioning and aspirate as needed  - Assess and instruct to report SOB or any respiratory difficulty  - Respiratory Therapy support as indicated  Outcome: Progressing     Problem: GASTROINTESTINAL - ADULT  Goal: Minimal or absence of nausea and/or vomiting  Description: INTERVENTIONS:  - Administer IV fluids if ordered to ensure adequate hydration  - Maintain NPO status until nausea and vomiting are resolved  - Nasogastric tube if ordered  - Administer ordered antiemetic medications as needed  - Provide nonpharmacologic comfort measures as appropriate  - Advance diet as tolerated, if ordered  - Consider nutrition services referral to assist patient with adequate nutrition and appropriate food choices  Outcome: Progressing  Goal: Maintains or returns to baseline bowel function  Description: INTERVENTIONS:  - Assess bowel function  - Encourage oral fluids to ensure adequate hydration  - Administer IV fluids if ordered to ensure adequate hydration  - Administer ordered medications as needed  - Encourage mobilization and activity  - Consider nutritional services referral to assist patient with adequate nutrition and appropriate food choices  Outcome: Progressing  Goal: Maintains adequate nutritional intake  Description: INTERVENTIONS:  - Monitor percentage of each meal consumed  - Identify factors contributing to decreased intake, treat as appropriate  - Assist with meals as needed  - Monitor I&O, weight, and lab values if indicated  - Obtain nutrition services referral as needed  Outcome: Progressing  Goal: Establish and maintain optimal ostomy function  Description: INTERVENTIONS:  - Assess bowel function  - Encourage oral fluids to ensure adequate hydration  - Administer IV fluids if ordered to ensure adequate hydration   - Administer ordered medications as needed  - Encourage mobilization and activity  - Nutrition services referral to assist patient with appropriate food choices  - Assess stoma site  - Consider wound care consult   Outcome: Progressing  Goal: Oral mucous membranes remain intact  Description: INTERVENTIONS  - Assess oral mucosa and hygiene practices  - Implement preventative oral hygiene regimen  - Implement oral medicated treatments as ordered  - Initiate Nutrition services referral as needed  Outcome: Progressing     Problem: GENITOURINARY - ADULT  Goal: Maintains or returns to baseline urinary function  Description: INTERVENTIONS:  - Assess urinary function  - Encourage oral fluids to ensure adequate hydration if ordered  - Administer IV fluids as ordered to ensure adequate hydration  - Administer ordered medications as needed  - Offer frequent toileting  - Follow urinary retention protocol if ordered  Outcome: Progressing  Goal: Absence of urinary retention  Description: INTERVENTIONS:  - Assess patients ability to void and empty bladder  - Monitor I/O  - Bladder scan as needed  - Discuss with physician/AP medications to alleviate retention as needed  - Discuss catheterization for long term situations as appropriate  Outcome: Progressing  Goal: Urinary catheter remains patent  Description: INTERVENTIONS:  - Assess patency of urinary catheter  - If patient has a chronic carmen, consider changing catheter if non-functioning  - Follow guidelines for intermittent irrigation of non-functioning urinary catheter  Outcome: Progressing     Problem: SKIN/TISSUE INTEGRITY - ADULT  Goal: Skin Integrity remains intact(Skin Breakdown Prevention)  Description: Assess:  -Perform Arnoldo assessment every shift  -Clean and moisturize skin every day  -Inspect skin when repositioning, toileting, and assisting with ADLS  -Assess extremities for adequate circulation and sensation     Bed Management:  -Have minimal linens on bed & keep smooth, unwrinkled  -Change linens as needed when moist or perspiring  -Avoid sitting or lying in one position for more than 2 hours while in bed  -Keep HOB at [address]:  -Offer bedside commode  -Assess for incontinence every   -Use incontinent care products after each incontinent episode such as foam    Activity:  -Mobilize patient 3 times a day  -Encourage activity and walks on unit  -Encourage or provide ROM exercises   -Turn and reposition patient every 2 Hours  -Use appropriate equipment to lift or move patient in bed  -Instruct/ Assist with weight shifting every 2 when out of bed in chair  -Consider limitation of chair time 1 hour intervals    Skin Care:  -Avoid use of baby powder, tape, friction and shearing, hot water or constrictive clothing  -Relieve pressure over bony prominences using wedges  -Do not massage red bony areas      Outcome: Progressing  Goal: Incision(s), wounds(s) or drain site(s) healing without S/S of infection  Description: INTERVENTIONS  - Assess and document dressing, incision, wound bed, drain sites and surrounding tissue  - Provide patient and family education  - Perform skin care/dressing changes every day  Outcome: Progressing  Goal: Pressure injury heals and does not worsen  Description: Interventions:  - Implement low air loss mattress or specialty surface (Criteria met)  - Apply silicone foam dressing  - Instruct/assist with weight shifting every 30 minutes when in chair   - Limit chair time to 1 hour intervals  - Use special pressure reducing interventions such as waffle when in chair   - Apply fecal or urinary incontinence containment device   - Perform passive or active ROM every day  - Turn and reposition patient & offload bony prominences every 2 hours   - Utilize friction reducing device or surface for transfers   - Consider consults to  interdisciplinary teams such as   - Use incontinent care products after each incontinent episode such as   - Consider nutrition services referral as needed  Outcome: Progressing

## 2022-07-14 LAB
ANION GAP SERPL CALCULATED.3IONS-SCNC: 2 MMOL/L (ref 4–13)
ANISOCYTOSIS BLD QL SMEAR: PRESENT
APTT PPP: 106 SECONDS (ref 23–37)
APTT PPP: 110 SECONDS (ref 23–37)
APTT PPP: 47 SECONDS (ref 23–37)
APTT PPP: 62 SECONDS (ref 23–37)
BASOPHILS # BLD MANUAL: 0 THOUSAND/UL (ref 0–0.1)
BASOPHILS NFR MAR MANUAL: 0 % (ref 0–1)
BUN SERPL-MCNC: 10 MG/DL (ref 5–25)
CALCIUM SERPL-MCNC: 8.8 MG/DL (ref 8.3–10.1)
CHLORIDE SERPL-SCNC: 96 MMOL/L (ref 100–108)
CO2 SERPL-SCNC: 31 MMOL/L (ref 21–32)
CREAT SERPL-MCNC: 0.6 MG/DL (ref 0.6–1.3)
EOSINOPHIL # BLD MANUAL: 0.05 THOUSAND/UL (ref 0–0.4)
EOSINOPHIL NFR BLD MANUAL: 1 % (ref 0–6)
ERYTHROCYTE [DISTWIDTH] IN BLOOD BY AUTOMATED COUNT: 16.9 % (ref 11.6–15.1)
GFR SERPL CREATININE-BSD FRML MDRD: 116 ML/MIN/1.73SQ M
GLUCOSE SERPL-MCNC: 106 MG/DL (ref 65–140)
GLUCOSE SERPL-MCNC: 110 MG/DL (ref 65–140)
GLUCOSE SERPL-MCNC: 115 MG/DL (ref 65–140)
GLUCOSE SERPL-MCNC: 133 MG/DL (ref 65–140)
GLUCOSE SERPL-MCNC: 148 MG/DL (ref 65–140)
HCT VFR BLD AUTO: 25.5 % (ref 36.5–49.3)
HGB BLD-MCNC: 7.5 G/DL (ref 12–17)
LYMPHOCYTES # BLD AUTO: 1.47 THOUSAND/UL (ref 0.6–4.47)
LYMPHOCYTES # BLD AUTO: 27 % (ref 14–44)
MCH RBC QN AUTO: 28.1 PG (ref 26.8–34.3)
MCHC RBC AUTO-ENTMCNC: 29.4 G/DL (ref 31.4–37.4)
MCV RBC AUTO: 96 FL (ref 82–98)
MONOCYTES # BLD AUTO: 0.44 THOUSAND/UL (ref 0–1.22)
MONOCYTES NFR BLD: 8 % (ref 4–12)
MYELOCYTES NFR BLD MANUAL: 1 % (ref 0–1)
NEUTROPHILS # BLD MANUAL: 3.43 THOUSAND/UL (ref 1.85–7.62)
NEUTS BAND NFR BLD MANUAL: 1 % (ref 0–8)
NEUTS SEG NFR BLD AUTO: 62 % (ref 43–75)
NRBC BLD AUTO-RTO: 2 /100 WBC (ref 0–2)
PLATELET # BLD AUTO: 194 THOUSANDS/UL (ref 149–390)
PLATELET BLD QL SMEAR: ADEQUATE
PMV BLD AUTO: 10.2 FL (ref 8.9–12.7)
POLYCHROMASIA BLD QL SMEAR: PRESENT
POTASSIUM SERPL-SCNC: 4.4 MMOL/L (ref 3.5–5.3)
RBC # BLD AUTO: 2.67 MILLION/UL (ref 3.88–5.62)
RBC MORPH BLD: PRESENT
SODIUM SERPL-SCNC: 129 MMOL/L (ref 136–145)
STOMATOCYTES BLD QL SMEAR: PRESENT
WBC # BLD AUTO: 5.45 THOUSAND/UL (ref 4.31–10.16)

## 2022-07-14 PROCEDURE — 85027 COMPLETE CBC AUTOMATED: CPT

## 2022-07-14 PROCEDURE — 82948 REAGENT STRIP/BLOOD GLUCOSE: CPT

## 2022-07-14 PROCEDURE — 94640 AIRWAY INHALATION TREATMENT: CPT

## 2022-07-14 PROCEDURE — 85007 BL SMEAR W/DIFF WBC COUNT: CPT

## 2022-07-14 PROCEDURE — 80048 BASIC METABOLIC PNL TOTAL CA: CPT

## 2022-07-14 PROCEDURE — 85730 THROMBOPLASTIN TIME PARTIAL: CPT | Performed by: SURGERY

## 2022-07-14 PROCEDURE — 99024 POSTOP FOLLOW-UP VISIT: CPT | Performed by: SURGERY

## 2022-07-14 PROCEDURE — 94660 CPAP INITIATION&MGMT: CPT

## 2022-07-14 PROCEDURE — 94760 N-INVAS EAR/PLS OXIMETRY 1: CPT

## 2022-07-14 RX ADMIN — ACETAMINOPHEN 975 MG: 325 TABLET, FILM COATED ORAL at 21:16

## 2022-07-14 RX ADMIN — QUETIAPINE FUMARATE 50 MG: 25 TABLET ORAL at 21:16

## 2022-07-14 RX ADMIN — OXYCODONE HYDROCHLORIDE 10 MG: 5 SOLUTION ORAL at 14:56

## 2022-07-14 RX ADMIN — GABAPENTIN 300 MG: 250 SOLUTION ORAL at 09:11

## 2022-07-14 RX ADMIN — HYDROMORPHONE HYDROCHLORIDE 1 MG: 1 INJECTION, SOLUTION INTRAMUSCULAR; INTRAVENOUS; SUBCUTANEOUS at 05:48

## 2022-07-14 RX ADMIN — DIGOXIN 250 MCG: 250 TABLET ORAL at 09:11

## 2022-07-14 RX ADMIN — HEPARIN SODIUM 22 UNITS/KG/HR: 10000 INJECTION, SOLUTION INTRAVENOUS at 02:25

## 2022-07-14 RX ADMIN — METOCLOPRAMIDE HYDROCHLORIDE 10 MG: 5 INJECTION INTRAMUSCULAR; INTRAVENOUS at 05:48

## 2022-07-14 RX ADMIN — PANTOPRAZOLE SODIUM 20 MG: 20 TABLET, DELAYED RELEASE ORAL at 05:48

## 2022-07-14 RX ADMIN — SODIUM CHLORIDE TAB 1 GM 2 G: 1 TAB at 09:11

## 2022-07-14 RX ADMIN — LEVALBUTEROL HYDROCHLORIDE 1.25 MG: 1.25 SOLUTION, CONCENTRATE RESPIRATORY (INHALATION) at 20:08

## 2022-07-14 RX ADMIN — BUDESONIDE AND FORMOTEROL FUMARATE DIHYDRATE 2 PUFF: 160; 4.5 AEROSOL RESPIRATORY (INHALATION) at 09:11

## 2022-07-14 RX ADMIN — HEPARIN SODIUM 5000 UNITS: 1000 INJECTION INTRAVENOUS; SUBCUTANEOUS at 21:17

## 2022-07-14 RX ADMIN — SODIUM CHLORIDE TAB 1 GM 2 G: 1 TAB at 17:53

## 2022-07-14 RX ADMIN — IPRATROPIUM BROMIDE 0.5 MG: 0.5 SOLUTION RESPIRATORY (INHALATION) at 08:06

## 2022-07-14 RX ADMIN — IPRATROPIUM BROMIDE 0.5 MG: 0.5 SOLUTION RESPIRATORY (INHALATION) at 20:08

## 2022-07-14 RX ADMIN — ACETAMINOPHEN 975 MG: 325 TABLET, FILM COATED ORAL at 13:52

## 2022-07-14 RX ADMIN — METHOCARBAMOL TABLETS 750 MG: 750 TABLET, COATED ORAL at 05:48

## 2022-07-14 RX ADMIN — METOCLOPRAMIDE HYDROCHLORIDE 10 MG: 5 INJECTION INTRAMUSCULAR; INTRAVENOUS at 23:56

## 2022-07-14 RX ADMIN — METOPROLOL TARTRATE 25 MG: 25 TABLET, FILM COATED ORAL at 09:10

## 2022-07-14 RX ADMIN — METHOCARBAMOL TABLETS 750 MG: 750 TABLET, COATED ORAL at 17:54

## 2022-07-14 RX ADMIN — LEVOTHYROXINE SODIUM 25 MCG: 25 TABLET ORAL at 05:48

## 2022-07-14 RX ADMIN — SODIUM CHLORIDE TAB 1 GM 2 G: 1 TAB at 21:16

## 2022-07-14 RX ADMIN — METOPROLOL TARTRATE 25 MG: 25 TABLET, FILM COATED ORAL at 15:02

## 2022-07-14 RX ADMIN — GABAPENTIN 300 MG: 250 SOLUTION ORAL at 21:17

## 2022-07-14 RX ADMIN — LEVALBUTEROL HYDROCHLORIDE 1.25 MG: 1.25 SOLUTION, CONCENTRATE RESPIRATORY (INHALATION) at 08:06

## 2022-07-14 RX ADMIN — GABAPENTIN 300 MG: 250 SOLUTION ORAL at 17:53

## 2022-07-14 RX ADMIN — HEPARIN SODIUM 20 UNITS/KG/HR: 10000 INJECTION, SOLUTION INTRAVENOUS at 13:52

## 2022-07-14 NOTE — PROGRESS NOTES
Progress Note - General Surgery   Audra Romano 47 y o  male MRN: [id number]  Unit/Bed#: [facility] 607-01 Encounter: [id number]    Assessment:  Patient is a 47 y o  male  now status post STSG to abdomen on 7/5  He is recovering well  Continuing to monitor for skin graft to take  Plan:  Cleared by nephro, recommend repeat BMP in 1 week  Dispo pending skin graft function and wound healing  BID dressing changes to abdominal wound  Transition from heparing gtt to oral AC once dispo is known    Subjective/Objective     Subjective:   No acute events overnight  No new concerns  Objective:    Blood pressure 122/56, pulse 91, temperature 99 °F (37 2 °C), resp  rate 16, height 5' 11" (1 803 m), weight (!) 225 kg (495 lb 2 5 oz), SpO2 96 %  ,Body mass index is 69 06 kg/m²  Intake/Output Summary (Last 24 hours) at 7/14/2022 0844  Last data filed at 7/14/2022 0709  Gross per 24 hour   Intake 1607 83 ml   Output 625 ml   Net 982 83 ml       Invasive Devices  Report    Peripherally Inserted Central Catheter Line  Duration           PICC Line 42/43/34 Right Basilic 30 days                Physical Exam  Vitals and nursing note reviewed  Constitutional:       General: He is not in acute distress  Appearance: He is obese  He is not ill-appearing, toxic-appearing or diaphoretic  HENT:      Head: Normocephalic and atraumatic  Right Ear: External ear normal       Left Ear: External ear normal       Nose: Nose normal       Comments: NC inserted       Mouth/Throat:      Mouth: Mucous membranes are moist    Eyes:      Extraocular Movements: Extraocular movements intact  Conjunctiva/sclera: Conjunctivae normal    Cardiovascular:      Rate and Rhythm: Normal rate and regular rhythm  Pulses: Normal pulses  Heart sounds: Normal heart sounds  No murmur heard  No friction rub  No gallop  Pulmonary:      Effort: Pulmonary effort is normal  No respiratory distress  Breath sounds: Normal breath sounds  Abdominal:      General: There is no distension  Palpations: Abdomen is soft  Tenderness: There is abdominal tenderness (appropriately tender)  There is no guarding or rebound  Comments: Midabdominal dressing clean   Musculoskeletal:      Cervical back: Normal range of motion  Comments: LLE covered in dressing  Serous drainage noted  Appropriately tender   Skin:     General: Skin is warm and dry  Capillary Refill: Capillary refill takes less than 2 seconds  Neurological:      Mental Status: He is alert  Mental status is at baseline     Psychiatric:         Mood and Affect: Mood normal          Behavior: Behavior normal              Results from last 7 days   Lab Units 07/14/22  0552 07/13/22  0315 07/12/22  0454   WBC Thousand/uL 5 45 4 69 5 90   HEMOGLOBIN g/dL 7 5* 7 8* 7 6*   HEMATOCRIT % 25 5* 26 2* 25 7*   PLATELETS Thousands/uL 194 181 209     Results from last 7 days   Lab Units 07/14/22  0552 07/13/22 0315 07/12/22  0454   POTASSIUM mmol/L 4 4 4 4 4 0   CHLORIDE mmol/L 96* 95* 94*   CO2 mmol/L 31 33* 32   BUN mg/dL 10 7 11   CREATININE mg/dL 0 60 0 58* 0 60   CALCIUM mg/dL 8 8 8 7 8 7     Results from last 7 days   Lab Units 07/14/22  0552 07/13/22  2311 07/13/22  1721   PTT seconds 106* 110* 90*        Kleber Ventura MD  PGY-1, General Surgery

## 2022-07-14 NOTE — PLAN OF CARE
Problem: Prexisting or High Potential for Compromised Skin Integrity  Goal: Skin integrity is maintained or improved  Description: INTERVENTIONS:  - Identify patients at risk for skin breakdown  - Assess and monitor skin integrity  - Assess and monitor nutrition and hydration status  - Monitor labs   - Assess for incontinence   - Turn and reposition patient  - Assist with mobility/ambulation  - Relieve pressure over bony prominences  - Avoid friction and shearing  - Provide appropriate hygiene as needed including keeping skin clean and dry  - Evaluate need for skin moisturizer/barrier cream  - Collaborate with interdisciplinary team   - Patient/family teaching  - Consider wound care consult   Outcome: Progressing     Problem: Potential for Falls  Goal: Patient will remain free of falls  Description: INTERVENTIONS:  - Educate patient/family on patient safety including physical limitations  - Instruct patient to call for assistance with activity   - Consult OT/PT to assist with strengthening/mobility   - Keep Call bell within reach  - Keep bed low and locked with side rails adjusted as appropriate  - Keep care items and personal belongings within reach  - Initiate and maintain comfort rounds  - Make Fall Risk Sign visible to staff  - Offer Toileting every 1 Hours, in advance of need  - Initiate/Maintain alarm  - Obtain necessary fall risk management equipment  - Apply yellow socks and bracelet for high fall risk patients  - Consider moving patient to room near nurses station  Outcome: Progressing     Problem: MOBILITY - ADULT  Goal: Maintain or return to baseline ADL function  Description: INTERVENTIONS:  -  Assess patient's ability to carry out ADLs; assess patient's baseline for ADL function and identify physical deficits which impact ability to perform ADLs (bathing, care of mouth/teeth, toileting, grooming, dressing, etc )  - Assess/evaluate cause of self-care deficits   - Assess range of motion  - Assess patient's mobility; develop plan if impaired  - Assess patient's need for assistive devices and provide as appropriate  - Encourage maximum independence but intervene and supervise when necessary  - Involve family in performance of ADLs  - Assess for home care needs following discharge   - Consider OT consult to assist with ADL evaluation and planning for discharge  - Provide patient education as appropriate  Outcome: Progressing  Goal: Maintains/Returns to pre admission functional level  Description: INTERVENTIONS:  - Perform BMAT or MOVE assessment daily    - Set and communicate daily mobility goal to care team and patient/family/caregiver  - Collaborate with rehabilitation services on mobility goals if consulted  - Perform Range of Motion 3 times a day  - Reposition patient every 2 hours  - Dangle patient 3 times a day  - Stand patient 3 times a day  - Ambulate patient 3 times a day  - Out of bed to chair 3 times a day   - Out of bed for meals 3 times a day  - Out of bed for toileting  - Record patient progress and toleration of activity level   Outcome: Progressing     Problem: Nutrition/Hydration-ADULT  Goal: Nutrient/Hydration intake appropriate for improving, restoring or maintaining nutritional needs  Description: Monitor and assess patient's nutrition/hydration status for malnutrition  Collaborate with interdisciplinary team and initiate plan and interventions as ordered  Monitor patient's weight and dietary intake as ordered or per policy  Utilize nutrition screening tool and intervene as necessary  Determine patient's food preferences and provide high-protein, high-caloric foods as appropriate       INTERVENTIONS:  - Monitor oral intake, urinary output, labs, and treatment plans  - Assess nutrition and hydration status and recommend course of action  - Evaluate amount of meals eaten  - Assist patient with eating if necessary   - Allow adequate time for meals  - Recommend/ encourage appropriate diets, oral nutritional supplements, and vitamin/mineral supplements  - Order, calculate, and assess calorie counts as needed  - Recommend, monitor, and adjust tube feedings and TPN/PPN based on assessed needs  - Assess need for intravenous fluids  - Provide specific nutrition/hydration education as appropriate  - Include patient/family/caregiver in decisions related to nutrition  Outcome: Progressing     Problem: PAIN - ADULT  Goal: Verbalizes/displays adequate comfort level or baseline comfort level  Description: Interventions:  - Encourage patient to monitor pain and request assistance  - Assess pain using appropriate pain scale  - Administer analgesics based on type and severity of pain and evaluate response  - Implement non-pharmacological measures as appropriate and evaluate response  - Consider cultural and social influences on pain and pain management  - Notify physician/advanced practitioner if interventions unsuccessful or patient reports new pain  Outcome: Progressing     Problem: INFECTION - ADULT  Goal: Absence or prevention of progression during hospitalization  Description: INTERVENTIONS:  - Assess and monitor for signs and symptoms of infection  - Monitor lab/diagnostic results  - Monitor all insertion sites, i e  indwelling lines, tubes, and drains  - Monitor endotracheal if appropriate and nasal secretions for changes in amount and color  - [place] appropriate cooling/warming therapies per order  - Administer medications as ordered  - Instruct and encourage patient and family to use good hand hygiene technique  - Identify and instruct in appropriate isolation precautions for identified infection/condition  Outcome: Progressing     Problem: DISCHARGE PLANNING  Goal: Discharge to home or other facility with appropriate resources  Description: INTERVENTIONS:  - Identify barriers to discharge w/patient and caregiver  - Arrange for needed discharge resources and transportation as appropriate  - Identify discharge learning needs (meds, wound care, etc )  - Arrange for interpretive services to assist at discharge as needed  - Refer to Case Management Department for coordinating discharge planning if the patient needs post-hospital services based on physician/advanced practitioner order or complex needs related to functional status, cognitive ability, or social support system  Outcome: Progressing     Problem: Knowledge Deficit  Goal: Patient/family/caregiver demonstrates understanding of disease process, treatment plan, medications, and discharge instructions  Description: Complete learning assessment and assess knowledge base    Interventions:  - Provide teaching at level of understanding  - Provide teaching via preferred learning methods  Outcome: Progressing     Problem: CARDIOVASCULAR - ADULT  Goal: Maintains optimal cardiac output and hemodynamic stability  Description: INTERVENTIONS:  - Monitor I/O, vital signs and rhythm  - Monitor for S/S and trends of decreased cardiac output  - Administer and titrate ordered vasoactive medications to optimize hemodynamic stability  - Assess quality of pulses, skin color and temperature  - Assess for signs of decreased coronary artery perfusion  - Instruct patient to report change in severity of symptoms  Outcome: Progressing  Goal: Absence of cardiac dysrhythmias or at baseline rhythm  Description: INTERVENTIONS:  - Continuous cardiac monitoring, vital signs, obtain 12 lead EKG if ordered  - Administer antiarrhythmic and heart rate control medications as ordered  - Monitor electrolytes and administer replacement therapy as ordered  Outcome: Progressing     Problem: RESPIRATORY - ADULT  Goal: Achieves optimal ventilation and oxygenation  Description: INTERVENTIONS:  - Assess for changes in respiratory status  - Assess for changes in mentation and behavior  - Position to facilitate oxygenation and minimize respiratory effort  - Oxygen administered by appropriate delivery if ordered  - Initiate smoking cessation education as indicated  - Encourage broncho-pulmonary hygiene including cough, deep breathe, Incentive Spirometry  - Assess the need for suctioning and aspirate as needed  - Assess and instruct to report SOB or any respiratory difficulty  - Respiratory Therapy support as indicated  Outcome: Progressing     Problem: GASTROINTESTINAL - ADULT  Goal: Minimal or absence of nausea and/or vomiting  Description: INTERVENTIONS:  - Administer IV fluids if ordered to ensure adequate hydration  - Maintain NPO status until nausea and vomiting are resolved  - Nasogastric tube if ordered  - Administer ordered antiemetic medications as needed  - Provide nonpharmacologic comfort measures as appropriate  - Advance diet as tolerated, if ordered  - Consider nutrition services referral to assist patient with adequate nutrition and appropriate food choices  Outcome: Progressing  Goal: Maintains or returns to baseline bowel function  Description: INTERVENTIONS:  - Assess bowel function  - Encourage oral fluids to ensure adequate hydration  - Administer IV fluids if ordered to ensure adequate hydration  - Administer ordered medications as needed  - Encourage mobilization and activity  - Consider nutritional services referral to assist patient with adequate nutrition and appropriate food choices  Outcome: Progressing  Goal: Maintains adequate nutritional intake  Description: INTERVENTIONS:  - Monitor percentage of each meal consumed  - Identify factors contributing to decreased intake, treat as appropriate  - Assist with meals as needed  - Monitor I&O, weight, and lab values if indicated  - Obtain nutrition services referral as needed  Outcome: Progressing  Goal: Establish and maintain optimal ostomy function  Description: INTERVENTIONS:  - Assess bowel function  - Encourage oral fluids to ensure adequate hydration  - Administer IV fluids if ordered to ensure adequate hydration   - Administer ordered medications as needed  - Encourage mobilization and activity  - Nutrition services referral to assist patient with appropriate food choices  - Assess stoma site  - Consider wound care consult   Outcome: Progressing  Goal: Oral mucous membranes remain intact  Description: INTERVENTIONS  - Assess oral mucosa and hygiene practices  - Implement preventative oral hygiene regimen  - Implement oral medicated treatments as ordered  - Initiate Nutrition services referral as needed  Outcome: Progressing     Problem: GENITOURINARY - ADULT  Goal: Maintains or returns to baseline urinary function  Description: INTERVENTIONS:  - Assess urinary function  - Encourage oral fluids to ensure adequate hydration if ordered  - Administer IV fluids as ordered to ensure adequate hydration  - Administer ordered medications as needed  - Offer frequent toileting  - Follow urinary retention protocol if ordered  Outcome: Progressing  Goal: Absence of urinary retention  Description: INTERVENTIONS:  - Assess patients ability to void and empty bladder  - Monitor I/O  - Bladder scan as needed  - Discuss with physician/AP medications to alleviate retention as needed  - Discuss catheterization for long term situations as appropriate  Outcome: Progressing  Goal: Urinary catheter remains patent  Description: INTERVENTIONS:  - Assess patency of urinary catheter  - If patient has a chronic carmen, consider changing catheter if non-functioning  - Follow guidelines for intermittent irrigation of non-functioning urinary catheter  Outcome: Progressing     Problem: SKIN/TISSUE INTEGRITY - ADULT  Goal: Skin Integrity remains intact(Skin Breakdown Prevention)  Description: Assess:  -Perform Arnoldo assessment every shift  -Clean and moisturize skin every day  -Inspect skin when repositioning, toileting, and assisting with ADLS  -Assess extremities for adequate circulation and sensation     Bed Management:  -Have minimal linens on bed & keep smooth, unwrinkled  -Change linens as needed when moist or perspiring  -Avoid sitting or lying in one position for more than 2 hours while in bed  -Keep HOB at [address]:  -Offer bedside commode  -Assess for incontinence every   -Use incontinent care products after each incontinent episode such as foam    Activity:  -Mobilize patient 3 times a day  -Encourage activity and walks on unit  -Encourage or provide ROM exercises   -Turn and reposition patient every 2 Hours  -Use appropriate equipment to lift or move patient in bed  -Instruct/ Assist with weight shifting every 2 when out of bed in chair  -Consider limitation of chair time 1 hour intervals    Skin Care:  -Avoid use of baby powder, tape, friction and shearing, hot water or constrictive clothing  -Relieve pressure over bony prominences using wedges  -Do not massage red bony areas      Outcome: Progressing  Goal: Incision(s), wounds(s) or drain site(s) healing without S/S of infection  Description: INTERVENTIONS  - Assess and document dressing, incision, wound bed, drain sites and surrounding tissue  - Provide patient and family education  - Perform skin care/dressing changes every day  Outcome: Progressing  Goal: Pressure injury heals and does not worsen  Description: Interventions:  - Implement low air loss mattress or specialty surface (Criteria met)  - Apply silicone foam dressing  - Instruct/assist with weight shifting every 30 minutes when in chair   - Limit chair time to 1 hour intervals  - Use special pressure reducing interventions such as waffle when in chair   - Apply fecal or urinary incontinence containment device   - Perform passive or active ROM every day  - Turn and reposition patient & offload bony prominences every 2 hours   - Utilize friction reducing device or surface for transfers   - Consider consults to  interdisciplinary teams such as   - Use incontinent care products after each incontinent episode such as   - Consider nutrition services referral as needed  Outcome: Progressing

## 2022-07-14 NOTE — QUICK NOTE
General Surgery Wound Check    Dressing was carefully removed, including abds, kerlix, and adaptic  Dressing was soaked in green-appearing discharge  The right/right upper area of the midline wound is greenish in color  There are two pockets in that area, unable to express any more drainage  New dressing with adaptic, kerlix, and abd pads applied, secured with tape  The RLQ wound was re-packed with packing strip and covered with abd pad/tape

## 2022-07-14 NOTE — QUICK NOTE
Evaluated patient at bedside to remove suture near area of new fistula formation  Removed suture without complications  Patient tolerated well  Re-dressed wound with Adaptic x5, Kerlix x1, and 5 ABD pads  Secured with paper tape  Right lower quadrant wound repacked with 1-1/2 inch plain packing  ABD placed on top and secured with paper tape      Felicia Hoyos PA-C

## 2022-07-14 NOTE — UTILIZATION REVIEW
Continued Stay Review    Date: 7/14                          Current Patient Class: Inpatient  Current Level of Care: Med Surg    HPI:52 y o  male initially admitted on 6/14    Assessment/Plan: S/p STSG to abdomen on 7/5  Continue to monitor for skin graft to take  Continues on Iv Heparin drip  Dispo pending skin graft function and wound healing  BID dressing changed to abdominal wound  Vital Signs:   07/14/22 15:04:41 97 4 °F (36 3 °C) Abnormal  90 20 120/90 100 100 % -- -- -- --   07/14/22 1502 -- 92 -- 120/90 -- -- -- -- -- --   07/14/22 09:09:43 -- 85 -- 124/95 105 93 % -- -- -- --   07/14/22 0900 -- -- -- -- -- -- 28 -- 2 L/min Nasal cannula       Pertinent Labs/Diagnostic Results:       Results from last 7 days   Lab Units 07/14/22  0552 07/13/22 0315 07/12/22  0454 07/11/22  0458 07/10/22  0605   WBC Thousand/uL 5 45 4 69 5 90 6 50 5 38   HEMOGLOBIN g/dL 7 5* 7 8* 7 6* 8 1* 7 8*   HEMATOCRIT % 25 5* 26 2* 25 7* 26 8* 25 6*   PLATELETS Thousands/uL 194 181 209 212 209   NEUTROS ABS Thousands/µL  --  2 11 2 46 3 06  --    BANDS PCT % 1  --   --   --   --          Results from last 7 days   Lab Units 07/14/22  0552 07/13/22 0315 07/12/22  0454 07/11/22  0458 07/10/22  0533 07/09/22  0626 07/08/22  0530   SODIUM mmol/L 129* 130* 131* 130* 129*   < > 129*   POTASSIUM mmol/L 4 4 4 4 4 0 4 2 4 1   < > 4 0   CHLORIDE mmol/L 96* 95* 94* 95* 93*   < > 91*   CO2 mmol/L 31 33* 32 32 34*   < > 35*   ANION GAP mmol/L 2* 2* 5 3* 2*   < > 3*   BUN mg/dL 10 7 11 14 14   < > 11   CREATININE mg/dL 0 60 0 58* 0 60 0 61 0 63   < > 0 58*   EGFR ml/min/1 73sq m 116 118 116 115 114   < > 118   CALCIUM mg/dL 8 8 8 7 8 7 9 2 9 1   < > 9 1   MAGNESIUM mg/dL  --   --   --   --   --   --  2 3   PHOSPHORUS mg/dL  --   --   --   --   --   --  3 1    < > = values in this interval not displayed       Results from last 7 days   Lab Units 07/08/22  0530   AST U/L 19   ALT U/L 20   ALK PHOS U/L 161*   TOTAL PROTEIN g/dL 7 4 ALBUMIN g/dL 2 4*   TOTAL BILIRUBIN mg/dL 1 01*     Results from last 7 days   Lab Units 07/14/22  1127 07/14/22  0740 07/13/22  2122 07/13/22  1654 07/13/22  1121 07/13/22  0743 07/12/22  2113 07/12/22  1703 07/12/22  1143 07/12/22  0733 07/11/22  2140 07/11/22  1727   POC GLUCOSE mg/dl 115 106 139 120 141* 123 112 173* 107 110 135 124     Results from last 7 days   Lab Units 07/14/22  0552 07/13/22  0315 07/12/22  0454 07/11/22  0458 07/10/22  0533 07/09/22  0626 07/08/22  0530 07/07/22  1534   GLUCOSE RANDOM mg/dL 110 113 111 111 112 112 122 121       Results from last 7 days   Lab Units 07/13/22  1342   PH ART  7 370   PCO2 ART mm Hg 50 1*   PO2 ART mm Hg 101 2   HCO3 ART mmol/L 28 3*   BASE EXC ART mmol/L 2 6   O2 CONTENT ART mL/dL 11 4*   O2 HGB, ARTERIAL % 95 8   ABG SOURCE  Radial, Right         Results from last 7 days   Lab Units 07/14/22  1346 07/14/22  0552 07/13/22  2311   PTT seconds 62* 106* 110*     Results from last 7 days   Lab Units 07/10/22  0533   TSH 3RD GENERATON uIU/mL 6 640*       Medications:   Scheduled Medications:  acetaminophen, 975 mg, Oral, Q8H MARY  benzocaine, 2 spray, Mucosal, Once  bisacodyl, 10 mg, Rectal, Daily  budesonide-formoterol, 2 puff, Inhalation, BID  digoxin, 250 mcg, Oral, Daily  gabapentin, 300 mg, Oral, TID  insulin lispro, 1-5 Units, Subcutaneous, 4x Daily (AC & HS)  ipratropium, 0 5 mg, Nebulization, BID  levalbuterol, 1 25 mg, Nebulization, BID  levothyroxine, 25 mcg, Oral, Early Morning  lidocaine, 1 application, Urethral, Once  methocarbamol, 750 mg, Oral, Q6H MARY  metoclopramide, 10 mg, Intravenous, Q6H MARY  metoprolol tartrate, 25 mg, Oral, Q8H  pantoprazole, 20 mg, Oral, Early Morning  polyethylene glycol, 17 g, Oral, Daily  QUEtiapine, 50 mg, Oral, HS  senna-docusate sodium, 1 tablet, Oral, BID  sodium chloride, 2 g, Oral, TID      Continuous IV Infusions:  heparin (porcine), 3-30 Units/kg/hr (Order-Specific), Intravenous, Titrated      PRN Meds:  heparin (porcine), 10,000 Units, Intravenous, Q1H PRN  heparin (porcine), 5,000 Units, Intravenous, Q1H PRN  HYDROmorphone, 1 mg, Intravenous, Q6H PRN 7/14 x1  ondansetron, 4 mg, Intravenous, Q4H PRN  oxyCODONE, 10 mg, Oral, Q4H PRN 7/14 x1  oxyCODONE, 5 mg, Oral, Q4H PRN        Discharge Plan: D    Network Utilization Review Department  ATTENTION: Please call with any questions or concerns to [phone] and carefully listen to the prompts so that you are directed to the right person  All voicemails are confidential   Valeta Pastel all requests for admission clinical reviews, approved or denied determinations and any other requests to dedicated fax number below belonging to the campus where the patient is receiving treatment   List of dedicated fax numbers for the Facilities:  [address] DENIALS (Administrative/Medical Necessity) [phone]   [address] (Maternity/NICU/Pediatrics) [phone]   [address] [place] [address] [address] [place] [address]  [address] Medical [place] [address] [address] [place] [address] [address] [place] [address] [place], [place] [address] [phone]

## 2022-07-14 NOTE — QUICK NOTE
Nurse-Patient-Provider rounds were completed with the patient's nurse today  We discussed the plan is to continue current diabetic diet, encourage upper extremity work with PT OT, b i d  dressing changes per surgical team to be performed daily  Small fistula present on right upper quadrant area of abdominal wound  Remains on heparin drip  Continue to monitor blood sugars closely  BiPAP as needed for comfort  We reviewed all of the invasive devices/lines/telemetry orders   - None  DVT Prophylaxis:  Hep Gtt, SCDs    Pain Assessment / Plan:  - Continue current analgesic regimen  Mobility Assessment / Plan:  - Activity as tolerated  Goals / Barriers for discharge:  Barriers to discharge include further need for surgical team wound care daily  Case management following; case and discharge needs discussed  All questions and concerns were addressed  I spent greater than 45 minutes reviewing the plan with the patient and the nurse, and coordinating care for the day      Gonzella Seip, PA-C  7/14/2022

## 2022-07-15 LAB
ANION GAP SERPL CALCULATED.3IONS-SCNC: 4 MMOL/L (ref 4–13)
APTT PPP: 100 SECONDS (ref 23–37)
APTT PPP: 61 SECONDS (ref 23–37)
APTT PPP: 91 SECONDS (ref 23–37)
BUN SERPL-MCNC: 9 MG/DL (ref 5–25)
CALCIUM SERPL-MCNC: 8.7 MG/DL (ref 8.3–10.1)
CHLORIDE SERPL-SCNC: 99 MMOL/L (ref 100–108)
CO2 SERPL-SCNC: 31 MMOL/L (ref 21–32)
CREAT SERPL-MCNC: 0.56 MG/DL (ref 0.6–1.3)
ERYTHROCYTE [DISTWIDTH] IN BLOOD BY AUTOMATED COUNT: 17.2 % (ref 11.6–15.1)
GFR SERPL CREATININE-BSD FRML MDRD: 119 ML/MIN/1.73SQ M
GLUCOSE SERPL-MCNC: 104 MG/DL (ref 65–140)
GLUCOSE SERPL-MCNC: 107 MG/DL (ref 65–140)
GLUCOSE SERPL-MCNC: 116 MG/DL (ref 65–140)
GLUCOSE SERPL-MCNC: 123 MG/DL (ref 65–140)
GLUCOSE SERPL-MCNC: 144 MG/DL (ref 65–140)
HCT VFR BLD AUTO: 25.1 % (ref 36.5–49.3)
HGB BLD-MCNC: 7.6 G/DL (ref 12–17)
MCH RBC QN AUTO: 28.5 PG (ref 26.8–34.3)
MCHC RBC AUTO-ENTMCNC: 30.3 G/DL (ref 31.4–37.4)
MCV RBC AUTO: 94 FL (ref 82–98)
PLATELET # BLD AUTO: 161 THOUSANDS/UL (ref 149–390)
PMV BLD AUTO: 10.6 FL (ref 8.9–12.7)
POTASSIUM SERPL-SCNC: 4.2 MMOL/L (ref 3.5–5.3)
RBC # BLD AUTO: 2.67 MILLION/UL (ref 3.88–5.62)
SODIUM SERPL-SCNC: 134 MMOL/L (ref 136–145)
WBC # BLD AUTO: 4.11 THOUSAND/UL (ref 4.31–10.16)

## 2022-07-15 PROCEDURE — 94760 N-INVAS EAR/PLS OXIMETRY 1: CPT

## 2022-07-15 PROCEDURE — 80048 BASIC METABOLIC PNL TOTAL CA: CPT | Performed by: PHYSICIAN ASSISTANT

## 2022-07-15 PROCEDURE — 82948 REAGENT STRIP/BLOOD GLUCOSE: CPT

## 2022-07-15 PROCEDURE — 99024 POSTOP FOLLOW-UP VISIT: CPT | Performed by: SURGERY

## 2022-07-15 PROCEDURE — 94660 CPAP INITIATION&MGMT: CPT

## 2022-07-15 PROCEDURE — 94640 AIRWAY INHALATION TREATMENT: CPT

## 2022-07-15 PROCEDURE — 85730 THROMBOPLASTIN TIME PARTIAL: CPT | Performed by: SURGERY

## 2022-07-15 PROCEDURE — 85027 COMPLETE CBC AUTOMATED: CPT | Performed by: PHYSICIAN ASSISTANT

## 2022-07-15 RX ADMIN — LEVALBUTEROL HYDROCHLORIDE 1.25 MG: 1.25 SOLUTION, CONCENTRATE RESPIRATORY (INHALATION) at 20:34

## 2022-07-15 RX ADMIN — QUETIAPINE FUMARATE 50 MG: 25 TABLET ORAL at 21:28

## 2022-07-15 RX ADMIN — GABAPENTIN 300 MG: 250 SOLUTION ORAL at 08:40

## 2022-07-15 RX ADMIN — IPRATROPIUM BROMIDE 0.5 MG: 0.5 SOLUTION RESPIRATORY (INHALATION) at 20:35

## 2022-07-15 RX ADMIN — OXYCODONE HYDROCHLORIDE 10 MG: 5 SOLUTION ORAL at 16:11

## 2022-07-15 RX ADMIN — SODIUM CHLORIDE TAB 1 GM 2 G: 1 TAB at 21:28

## 2022-07-15 RX ADMIN — METOCLOPRAMIDE HYDROCHLORIDE 10 MG: 5 INJECTION INTRAMUSCULAR; INTRAVENOUS at 17:54

## 2022-07-15 RX ADMIN — ACETAMINOPHEN 975 MG: 325 TABLET, FILM COATED ORAL at 05:18

## 2022-07-15 RX ADMIN — OXYCODONE HYDROCHLORIDE 10 MG: 5 SOLUTION ORAL at 11:25

## 2022-07-15 RX ADMIN — SENNOSIDES AND DOCUSATE SODIUM 1 TABLET: 8.6; 5 TABLET ORAL at 08:37

## 2022-07-15 RX ADMIN — LEVALBUTEROL HYDROCHLORIDE 1.25 MG: 1.25 SOLUTION, CONCENTRATE RESPIRATORY (INHALATION) at 08:53

## 2022-07-15 RX ADMIN — GABAPENTIN 300 MG: 250 SOLUTION ORAL at 17:54

## 2022-07-15 RX ADMIN — LEVOTHYROXINE SODIUM 25 MCG: 25 TABLET ORAL at 05:18

## 2022-07-15 RX ADMIN — HEPARIN SODIUM 20 UNITS/KG/HR: 10000 INJECTION, SOLUTION INTRAVENOUS at 11:09

## 2022-07-15 RX ADMIN — OXYCODONE HYDROCHLORIDE 10 MG: 5 SOLUTION ORAL at 04:06

## 2022-07-15 RX ADMIN — ACETAMINOPHEN 975 MG: 325 TABLET, FILM COATED ORAL at 21:28

## 2022-07-15 RX ADMIN — HYDROMORPHONE HYDROCHLORIDE 1 MG: 1 INJECTION, SOLUTION INTRAMUSCULAR; INTRAVENOUS; SUBCUTANEOUS at 18:03

## 2022-07-15 RX ADMIN — METOCLOPRAMIDE HYDROCHLORIDE 10 MG: 5 INJECTION INTRAMUSCULAR; INTRAVENOUS at 05:20

## 2022-07-15 RX ADMIN — METHOCARBAMOL TABLETS 750 MG: 750 TABLET, COATED ORAL at 05:18

## 2022-07-15 RX ADMIN — BUDESONIDE AND FORMOTEROL FUMARATE DIHYDRATE 2 PUFF: 160; 4.5 AEROSOL RESPIRATORY (INHALATION) at 08:37

## 2022-07-15 RX ADMIN — METOPROLOL TARTRATE 25 MG: 25 TABLET, FILM COATED ORAL at 16:07

## 2022-07-15 RX ADMIN — DIGOXIN 250 MCG: 250 TABLET ORAL at 08:37

## 2022-07-15 RX ADMIN — METHOCARBAMOL TABLETS 750 MG: 750 TABLET, COATED ORAL at 11:25

## 2022-07-15 RX ADMIN — OXYCODONE HYDROCHLORIDE 10 MG: 5 SOLUTION ORAL at 21:28

## 2022-07-15 RX ADMIN — METHOCARBAMOL TABLETS 750 MG: 750 TABLET, COATED ORAL at 17:54

## 2022-07-15 RX ADMIN — HEPARIN SODIUM 22 UNITS/KG/HR: 10000 INJECTION, SOLUTION INTRAVENOUS at 00:13

## 2022-07-15 RX ADMIN — METOPROLOL TARTRATE 25 MG: 25 TABLET, FILM COATED ORAL at 08:37

## 2022-07-15 RX ADMIN — METOCLOPRAMIDE HYDROCHLORIDE 10 MG: 5 INJECTION INTRAMUSCULAR; INTRAVENOUS at 11:23

## 2022-07-15 RX ADMIN — PANTOPRAZOLE SODIUM 20 MG: 20 TABLET, DELAYED RELEASE ORAL at 05:18

## 2022-07-15 RX ADMIN — SODIUM CHLORIDE TAB 1 GM 2 G: 1 TAB at 16:07

## 2022-07-15 RX ADMIN — SODIUM CHLORIDE TAB 1 GM 2 G: 1 TAB at 08:37

## 2022-07-15 RX ADMIN — HEPARIN SODIUM 18 UNITS/KG/HR: 10000 INJECTION, SOLUTION INTRAVENOUS at 22:40

## 2022-07-15 RX ADMIN — GABAPENTIN 300 MG: 250 SOLUTION ORAL at 21:31

## 2022-07-15 RX ADMIN — IPRATROPIUM BROMIDE 0.5 MG: 0.5 SOLUTION RESPIRATORY (INHALATION) at 08:53

## 2022-07-15 RX ADMIN — ACETAMINOPHEN 975 MG: 325 TABLET, FILM COATED ORAL at 13:33

## 2022-07-15 NOTE — QUICK NOTE
General Surgery Quick Note:    Abdominal midline wound evaluated bedside  Right sided EC fistula identified  Remaining abdomen covered with adaptic, kerlex, and ABDs  Wound manager placed over fistula with stoma paste for adherence  Discussed with nursing  Will attach carmen bag for monitoring of output  Surgical team to continue dressing changes      Figueroa Dong MD

## 2022-07-15 NOTE — PROGRESS NOTES
Progress Note - Gato Wu 47 y o  male MRN: [id number]    Unit/Bed#: [facility] 607-01 Encounter: [id number]      Assessment:  Patient is a 79-year-old male with complex PMHx who presented w a SBO and ventral hernia now postop day 9 from an attempted STSG, I&D, washout and VAC placement on 06/30 and 2nd STSG on 07/05  Plan:  - general surgery to continue BID daily dressing changes  - continue to monitor hemoglobin and WBC  - appreciate nephro recs: cleared, recommend repeat BMP next week  - continue multimodal pain and nausea control p r n   - Maintain nightly bipap as necessary for patient comfort  - please tiger text on-call surgery resident with questions/concerns    Subjective:   No acute events overnight  Vital signs stable  Uses 2 L oxygen overnight via nasal cannula routinely  Continues to tolerate carb controlled diet well without any N/V/D  Pain is well controlled on current regimen  Voiding bladder spontaneously  Having regular bowel movements  Dressings present over anterior midline STSG site and small puncture wound in RLQ  Both are C/D/I Patient feels well overall  Reports no subjective fevers or chills  Denies headache, chest pain, shortness of breath, nausea, vomiting, and diarrhea  Patient resting comfortably in bed with blankets pulled over his head when I entered the room  Labs not back by time of note  Objective:     Vitals: Blood pressure 136/50, pulse 81, temperature 99 °F (37 2 °C), resp  rate 16, height 5' 11" (1 803 m), weight (!) 225 kg (495 lb 2 5 oz), SpO2 100 %  ,Body mass index is 69 06 kg/m²        Intake/Output Summary (Last 24 hours) at 7/15/2022 5527  Last data filed at 7/15/2022 0301  Gross per 24 hour   Intake 591 83 ml   Output 1541 ml   Net -949 17 ml       Physical Exam:  Gen: patient well appearing, no acute distress, resting comfortably in bed  CV: warm, well perfused  Lungs: normal work of breathing, no respiratory distress, using 2L NC  Abd: soft, non-distended, midline anterior and RLQ dressing c/d/i  Neuro: alert and oriented, MS grossly intact  MSK: moves all extremities normally    Invasive Devices  Report    Peripherally Inserted Central Catheter Line  Duration           PICC Line 58/47/94 Right Basilic 31 days                Lab, Imaging and other studies: I have personally reviewed pertinent reports      VTE Pharmacologic Prophylaxis: Heparin gtt  VTE Mechanical Prophylaxis: sequential compression device     Ilan Abbott MD  PGY1 Orthopedic Surgery

## 2022-07-15 NOTE — CASE MANAGEMENT
Case Management Progress Note    Patient name Serina Mantilla  Location [facility] 607/[facility] 273-35 MRN [id number]  : 1970 Date 7/15/2022       LOS (days): 31  Geometric Mean LOS (GMLOS) (days): 10 30  Days to GMLOS:-20 3        OBJECTIVE:        Current admission status: Inpatient  Preferred Pharmacy:   [facility]/pharmacy #[id number]- [address]  [address]   [address] [address], [address] [place] [address]  Phone: [phone] Fax: [phone]    Primary Care Provider: Kaitlin Gillespie MD    Primary Insurance: [address]  Secondary Insurance:     PROGRESS NOTE: Update from surgical team, patient has developed new fistula, not stable for DC at this time

## 2022-07-16 LAB
ANION GAP SERPL CALCULATED.3IONS-SCNC: 3 MMOL/L (ref 4–13)
APTT PPP: 116 SECONDS (ref 23–37)
APTT PPP: 42 SECONDS (ref 23–37)
APTT PPP: 91 SECONDS (ref 23–37)
BASOPHILS # BLD AUTO: 0.03 THOUSANDS/ΜL (ref 0–0.1)
BASOPHILS NFR BLD AUTO: 1 % (ref 0–1)
BUN SERPL-MCNC: 10 MG/DL (ref 5–25)
CALCIUM SERPL-MCNC: 8.7 MG/DL (ref 8.3–10.1)
CHLORIDE SERPL-SCNC: 98 MMOL/L (ref 100–108)
CO2 SERPL-SCNC: 32 MMOL/L (ref 21–32)
CREAT SERPL-MCNC: 0.56 MG/DL (ref 0.6–1.3)
EOSINOPHIL # BLD AUTO: 0.09 THOUSAND/ΜL (ref 0–0.61)
EOSINOPHIL NFR BLD AUTO: 2 % (ref 0–6)
ERYTHROCYTE [DISTWIDTH] IN BLOOD BY AUTOMATED COUNT: 17 % (ref 11.6–15.1)
GFR SERPL CREATININE-BSD FRML MDRD: 119 ML/MIN/1.73SQ M
GLUCOSE SERPL-MCNC: 108 MG/DL (ref 65–140)
GLUCOSE SERPL-MCNC: 137 MG/DL (ref 65–140)
GLUCOSE SERPL-MCNC: 151 MG/DL (ref 65–140)
GLUCOSE SERPL-MCNC: 91 MG/DL (ref 65–140)
GLUCOSE SERPL-MCNC: 97 MG/DL (ref 65–140)
HCT VFR BLD AUTO: 25.9 % (ref 36.5–49.3)
HGB BLD-MCNC: 7.6 G/DL (ref 12–17)
IMM GRANULOCYTES # BLD AUTO: 0.14 THOUSAND/UL (ref 0–0.2)
IMM GRANULOCYTES NFR BLD AUTO: 2 % (ref 0–2)
LYMPHOCYTES # BLD AUTO: 2.33 THOUSANDS/ΜL (ref 0.6–4.47)
LYMPHOCYTES NFR BLD AUTO: 39 % (ref 14–44)
MCH RBC QN AUTO: 28.7 PG (ref 26.8–34.3)
MCHC RBC AUTO-ENTMCNC: 29.3 G/DL (ref 31.4–37.4)
MCV RBC AUTO: 98 FL (ref 82–98)
MONOCYTES # BLD AUTO: 0.58 THOUSAND/ΜL (ref 0.17–1.22)
MONOCYTES NFR BLD AUTO: 10 % (ref 4–12)
NEUTROPHILS # BLD AUTO: 2.8 THOUSANDS/ΜL (ref 1.85–7.62)
NEUTS SEG NFR BLD AUTO: 46 % (ref 43–75)
NRBC BLD AUTO-RTO: 1 /100 WBCS
PLATELET # BLD AUTO: 183 THOUSANDS/UL (ref 149–390)
PMV BLD AUTO: 10.4 FL (ref 8.9–12.7)
POTASSIUM SERPL-SCNC: 4.6 MMOL/L (ref 3.5–5.3)
RBC # BLD AUTO: 2.65 MILLION/UL (ref 3.88–5.62)
SODIUM SERPL-SCNC: 133 MMOL/L (ref 136–145)
WBC # BLD AUTO: 5.97 THOUSAND/UL (ref 4.31–10.16)

## 2022-07-16 PROCEDURE — 82948 REAGENT STRIP/BLOOD GLUCOSE: CPT

## 2022-07-16 PROCEDURE — 85730 THROMBOPLASTIN TIME PARTIAL: CPT | Performed by: SURGERY

## 2022-07-16 PROCEDURE — 80048 BASIC METABOLIC PNL TOTAL CA: CPT | Performed by: SURGERY

## 2022-07-16 PROCEDURE — 94760 N-INVAS EAR/PLS OXIMETRY 1: CPT

## 2022-07-16 PROCEDURE — 94660 CPAP INITIATION&MGMT: CPT

## 2022-07-16 PROCEDURE — 85025 COMPLETE CBC W/AUTO DIFF WBC: CPT | Performed by: SURGERY

## 2022-07-16 PROCEDURE — 85730 THROMBOPLASTIN TIME PARTIAL: CPT | Performed by: NURSE PRACTITIONER

## 2022-07-16 PROCEDURE — 94640 AIRWAY INHALATION TREATMENT: CPT

## 2022-07-16 PROCEDURE — 99024 POSTOP FOLLOW-UP VISIT: CPT | Performed by: SURGERY

## 2022-07-16 RX ADMIN — HEPARIN SODIUM 17 UNITS/KG/HR: 10000 INJECTION, SOLUTION INTRAVENOUS at 18:28

## 2022-07-16 RX ADMIN — ACETAMINOPHEN 975 MG: 325 TABLET, FILM COATED ORAL at 21:00

## 2022-07-16 RX ADMIN — METHOCARBAMOL TABLETS 750 MG: 750 TABLET, COATED ORAL at 13:03

## 2022-07-16 RX ADMIN — METHOCARBAMOL TABLETS 750 MG: 750 TABLET, COATED ORAL at 17:26

## 2022-07-16 RX ADMIN — METOPROLOL TARTRATE 25 MG: 25 TABLET, FILM COATED ORAL at 08:50

## 2022-07-16 RX ADMIN — METOPROLOL TARTRATE 25 MG: 25 TABLET, FILM COATED ORAL at 16:40

## 2022-07-16 RX ADMIN — INSULIN LISPRO 1 UNITS: 100 INJECTION, SOLUTION INTRAVENOUS; SUBCUTANEOUS at 21:02

## 2022-07-16 RX ADMIN — ACETAMINOPHEN 975 MG: 325 TABLET, FILM COATED ORAL at 13:03

## 2022-07-16 RX ADMIN — BUDESONIDE AND FORMOTEROL FUMARATE DIHYDRATE 2 PUFF: 160; 4.5 AEROSOL RESPIRATORY (INHALATION) at 08:59

## 2022-07-16 RX ADMIN — SODIUM CHLORIDE TAB 1 GM 2 G: 1 TAB at 20:54

## 2022-07-16 RX ADMIN — HEPARIN SODIUM 20 UNITS/KG/HR: 10000 INJECTION, SOLUTION INTRAVENOUS at 10:37

## 2022-07-16 RX ADMIN — HEPARIN SODIUM 17 UNITS/KG/HR: 10000 INJECTION, SOLUTION INTRAVENOUS at 22:58

## 2022-07-16 RX ADMIN — METOCLOPRAMIDE HYDROCHLORIDE 10 MG: 5 INJECTION INTRAMUSCULAR; INTRAVENOUS at 06:09

## 2022-07-16 RX ADMIN — METOPROLOL TARTRATE 25 MG: 25 TABLET, FILM COATED ORAL at 01:06

## 2022-07-16 RX ADMIN — POLYETHYLENE GLYCOL 3350 17 G: 17 POWDER, FOR SOLUTION ORAL at 09:03

## 2022-07-16 RX ADMIN — GABAPENTIN 300 MG: 250 SOLUTION ORAL at 16:45

## 2022-07-16 RX ADMIN — GABAPENTIN 300 MG: 250 SOLUTION ORAL at 09:01

## 2022-07-16 RX ADMIN — METOCLOPRAMIDE HYDROCHLORIDE 10 MG: 5 INJECTION INTRAMUSCULAR; INTRAVENOUS at 17:26

## 2022-07-16 RX ADMIN — PANTOPRAZOLE SODIUM 20 MG: 20 TABLET, DELAYED RELEASE ORAL at 06:09

## 2022-07-16 RX ADMIN — GABAPENTIN 300 MG: 250 SOLUTION ORAL at 20:58

## 2022-07-16 RX ADMIN — OXYCODONE HYDROCHLORIDE 10 MG: 5 SOLUTION ORAL at 13:03

## 2022-07-16 RX ADMIN — ACETAMINOPHEN 975 MG: 325 TABLET, FILM COATED ORAL at 06:09

## 2022-07-16 RX ADMIN — BUDESONIDE AND FORMOTEROL FUMARATE DIHYDRATE 2 PUFF: 160; 4.5 AEROSOL RESPIRATORY (INHALATION) at 20:53

## 2022-07-16 RX ADMIN — DIGOXIN 250 MCG: 250 TABLET ORAL at 08:50

## 2022-07-16 RX ADMIN — METHOCARBAMOL TABLETS 750 MG: 750 TABLET, COATED ORAL at 01:03

## 2022-07-16 RX ADMIN — IPRATROPIUM BROMIDE 0.5 MG: 0.5 SOLUTION RESPIRATORY (INHALATION) at 19:13

## 2022-07-16 RX ADMIN — SODIUM CHLORIDE TAB 1 GM 2 G: 1 TAB at 08:50

## 2022-07-16 RX ADMIN — METHOCARBAMOL TABLETS 750 MG: 750 TABLET, COATED ORAL at 06:09

## 2022-07-16 RX ADMIN — LEVALBUTEROL HYDROCHLORIDE 1.25 MG: 1.25 SOLUTION, CONCENTRATE RESPIRATORY (INHALATION) at 19:13

## 2022-07-16 RX ADMIN — LEVOTHYROXINE SODIUM 25 MCG: 25 TABLET ORAL at 06:09

## 2022-07-16 RX ADMIN — METOCLOPRAMIDE HYDROCHLORIDE 10 MG: 5 INJECTION INTRAMUSCULAR; INTRAVENOUS at 01:01

## 2022-07-16 RX ADMIN — QUETIAPINE FUMARATE 50 MG: 25 TABLET ORAL at 21:00

## 2022-07-16 RX ADMIN — SENNOSIDES AND DOCUSATE SODIUM 1 TABLET: 8.6; 5 TABLET ORAL at 08:50

## 2022-07-16 RX ADMIN — SENNOSIDES AND DOCUSATE SODIUM 1 TABLET: 8.6; 5 TABLET ORAL at 17:26

## 2022-07-16 RX ADMIN — SODIUM CHLORIDE TAB 1 GM 2 G: 1 TAB at 16:44

## 2022-07-16 RX ADMIN — OXYCODONE HYDROCHLORIDE 10 MG: 5 SOLUTION ORAL at 06:17

## 2022-07-16 RX ADMIN — METOCLOPRAMIDE HYDROCHLORIDE 10 MG: 5 INJECTION INTRAMUSCULAR; INTRAVENOUS at 13:04

## 2022-07-16 RX ADMIN — OXYCODONE HYDROCHLORIDE 10 MG: 5 SOLUTION ORAL at 01:31

## 2022-07-16 RX ADMIN — HEPARIN SODIUM 10000 UNITS: 1000 INJECTION INTRAVENOUS; SUBCUTANEOUS at 02:28

## 2022-07-16 NOTE — PLAN OF CARE
Problem: Prexisting or High Potential for Compromised Skin Integrity  Goal: Skin integrity is maintained or improved  Description: INTERVENTIONS:  - Identify patients at risk for skin breakdown  - Assess and monitor skin integrity  - Assess and monitor nutrition and hydration status  - Monitor labs   - Assess for incontinence   - Turn and reposition patient  - Assist with mobility/ambulation  - Relieve pressure over bony prominences  - Avoid friction and shearing  - Provide appropriate hygiene as needed including keeping skin clean and dry  - Evaluate need for skin moisturizer/barrier cream  - Collaborate with interdisciplinary team   - Patient/family teaching  - Consider wound care consult   Outcome: Progressing     Problem: Potential for Falls  Goal: Patient will remain free of falls  Description: INTERVENTIONS:  - Educate patient/family on patient safety including physical limitations  - Instruct patient to call for assistance with activity   - Consult OT/PT to assist with strengthening/mobility   - Keep Call bell within reach  - Keep bed low and locked with side rails adjusted as appropriate  - Keep care items and personal belongings within reach  - Initiate and maintain comfort rounds  - Make Fall Risk Sign visible to staff  - Offer Toileting every 1 Hours, in advance of need  - Initiate/Maintain alarm  - Obtain necessary fall risk management equipment  - Apply yellow socks and bracelet for high fall risk patients  - Consider moving patient to room near nurses station  Outcome: Progressing     Problem: MOBILITY - ADULT  Goal: Maintain or return to baseline ADL function  Description: INTERVENTIONS:  -  Assess patient's ability to carry out ADLs; assess patient's baseline for ADL function and identify physical deficits which impact ability to perform ADLs (bathing, care of mouth/teeth, toileting, grooming, dressing, etc )  - Assess/evaluate cause of self-care deficits   - Assess range of motion  - Assess patient's mobility; develop plan if impaired  - Assess patient's need for assistive devices and provide as appropriate  - Encourage maximum independence but intervene and supervise when necessary  - Involve family in performance of ADLs  - Assess for home care needs following discharge   - Consider OT consult to assist with ADL evaluation and planning for discharge  - Provide patient education as appropriate  Outcome: Progressing  Goal: Maintains/Returns to pre admission functional level  Description: INTERVENTIONS:  - Perform BMAT or MOVE assessment daily    - Set and communicate daily mobility goal to care team and patient/family/caregiver  - Collaborate with rehabilitation services on mobility goals if consulted  - Perform Range of Motion 3 times a day  - Reposition patient every 2 hours  - Dangle patient 3 times a day  - Stand patient 3 times a day  - Ambulate patient 3 times a day  - Out of bed to chair 3 times a day   - Out of bed for meals 3 times a day  - Out of bed for toileting  - Record patient progress and toleration of activity level   Outcome: Progressing     Problem: Nutrition/Hydration-ADULT  Goal: Nutrient/Hydration intake appropriate for improving, restoring or maintaining nutritional needs  Description: Monitor and assess patient's nutrition/hydration status for malnutrition  Collaborate with interdisciplinary team and initiate plan and interventions as ordered  Monitor patient's weight and dietary intake as ordered or per policy  Utilize nutrition screening tool and intervene as necessary  Determine patient's food preferences and provide high-protein, high-caloric foods as appropriate       INTERVENTIONS:  - Monitor oral intake, urinary output, labs, and treatment plans  - Assess nutrition and hydration status and recommend course of action  - Evaluate amount of meals eaten  - Assist patient with eating if necessary   - Allow adequate time for meals  - Recommend/ encourage appropriate diets, oral nutritional supplements, and vitamin/mineral supplements  - Order, calculate, and assess calorie counts as needed  - Recommend, monitor, and adjust tube feedings and TPN/PPN based on assessed needs  - Assess need for intravenous fluids  - Provide specific nutrition/hydration education as appropriate  - Include patient/family/caregiver in decisions related to nutrition  Outcome: Progressing     Problem: PAIN - ADULT  Goal: Verbalizes/displays adequate comfort level or baseline comfort level  Description: Interventions:  - Encourage patient to monitor pain and request assistance  - Assess pain using appropriate pain scale  - Administer analgesics based on type and severity of pain and evaluate response  - Implement non-pharmacological measures as appropriate and evaluate response  - Consider cultural and social influences on pain and pain management  - Notify physician/advanced practitioner if interventions unsuccessful or patient reports new pain  Outcome: Progressing     Problem: INFECTION - ADULT  Goal: Absence or prevention of progression during hospitalization  Description: INTERVENTIONS:  - Assess and monitor for signs and symptoms of infection  - Monitor lab/diagnostic results  - Monitor all insertion sites, i e  indwelling lines, tubes, and drains  - Monitor endotracheal if appropriate and nasal secretions for changes in amount and color  - [place] appropriate cooling/warming therapies per order  - Administer medications as ordered  - Instruct and encourage patient and family to use good hand hygiene technique  - Identify and instruct in appropriate isolation precautions for identified infection/condition  Outcome: Progressing     Problem: DISCHARGE PLANNING  Goal: Discharge to home or other facility with appropriate resources  Description: INTERVENTIONS:  - Identify barriers to discharge w/patient and caregiver  - Arrange for needed discharge resources and transportation as appropriate  - Identify discharge learning needs (meds, wound care, etc )  - Arrange for interpretive services to assist at discharge as needed  - Refer to Case Management Department for coordinating discharge planning if the patient needs post-hospital services based on physician/advanced practitioner order or complex needs related to functional status, cognitive ability, or social support system  Outcome: Progressing     Problem: Knowledge Deficit  Goal: Patient/family/caregiver demonstrates understanding of disease process, treatment plan, medications, and discharge instructions  Description: Complete learning assessment and assess knowledge base    Interventions:  - Provide teaching at level of understanding  - Provide teaching via preferred learning methods  Outcome: Progressing     Problem: CARDIOVASCULAR - ADULT  Goal: Maintains optimal cardiac output and hemodynamic stability  Description: INTERVENTIONS:  - Monitor I/O, vital signs and rhythm  - Monitor for S/S and trends of decreased cardiac output  - Administer and titrate ordered vasoactive medications to optimize hemodynamic stability  - Assess quality of pulses, skin color and temperature  - Assess for signs of decreased coronary artery perfusion  - Instruct patient to report change in severity of symptoms  Outcome: Progressing  Goal: Absence of cardiac dysrhythmias or at baseline rhythm  Description: INTERVENTIONS:  - Continuous cardiac monitoring, vital signs, obtain 12 lead EKG if ordered  - Administer antiarrhythmic and heart rate control medications as ordered  - Monitor electrolytes and administer replacement therapy as ordered  Outcome: Progressing     Problem: RESPIRATORY - ADULT  Goal: Achieves optimal ventilation and oxygenation  Description: INTERVENTIONS:  - Assess for changes in respiratory status  - Assess for changes in mentation and behavior  - Position to facilitate oxygenation and minimize respiratory effort  - Oxygen administered by appropriate delivery if ordered  - Initiate smoking cessation education as indicated  - Encourage broncho-pulmonary hygiene including cough, deep breathe, Incentive Spirometry  - Assess the need for suctioning and aspirate as needed  - Assess and instruct to report SOB or any respiratory difficulty  - Respiratory Therapy support as indicated  Outcome: Progressing     Problem: GASTROINTESTINAL - ADULT  Goal: Minimal or absence of nausea and/or vomiting  Description: INTERVENTIONS:  - Administer IV fluids if ordered to ensure adequate hydration  - Maintain NPO status until nausea and vomiting are resolved  - Nasogastric tube if ordered  - Administer ordered antiemetic medications as needed  - Provide nonpharmacologic comfort measures as appropriate  - Advance diet as tolerated, if ordered  - Consider nutrition services referral to assist patient with adequate nutrition and appropriate food choices  Outcome: Progressing  Goal: Maintains or returns to baseline bowel function  Description: INTERVENTIONS:  - Assess bowel function  - Encourage oral fluids to ensure adequate hydration  - Administer IV fluids if ordered to ensure adequate hydration  - Administer ordered medications as needed  - Encourage mobilization and activity  - Consider nutritional services referral to assist patient with adequate nutrition and appropriate food choices  Outcome: Progressing  Goal: Maintains adequate nutritional intake  Description: INTERVENTIONS:  - Monitor percentage of each meal consumed  - Identify factors contributing to decreased intake, treat as appropriate  - Assist with meals as needed  - Monitor I&O, weight, and lab values if indicated  - Obtain nutrition services referral as needed  Outcome: Progressing  Goal: Establish and maintain optimal ostomy function  Description: INTERVENTIONS:  - Assess bowel function  - Encourage oral fluids to ensure adequate hydration  - Administer IV fluids if ordered to ensure adequate hydration   - Administer ordered medications as needed  - Encourage mobilization and activity  - Nutrition services referral to assist patient with appropriate food choices  - Assess stoma site  - Consider wound care consult   Outcome: Progressing  Goal: Oral mucous membranes remain intact  Description: INTERVENTIONS  - Assess oral mucosa and hygiene practices  - Implement preventative oral hygiene regimen  - Implement oral medicated treatments as ordered  - Initiate Nutrition services referral as needed  Outcome: Progressing     Problem: GENITOURINARY - ADULT  Goal: Maintains or returns to baseline urinary function  Description: INTERVENTIONS:  - Assess urinary function  - Encourage oral fluids to ensure adequate hydration if ordered  - Administer IV fluids as ordered to ensure adequate hydration  - Administer ordered medications as needed  - Offer frequent toileting  - Follow urinary retention protocol if ordered  Outcome: Progressing  Goal: Absence of urinary retention  Description: INTERVENTIONS:  - Assess patients ability to void and empty bladder  - Monitor I/O  - Bladder scan as needed  - Discuss with physician/AP medications to alleviate retention as needed  - Discuss catheterization for long term situations as appropriate  Outcome: Progressing  Goal: Urinary catheter remains patent  Description: INTERVENTIONS:  - Assess patency of urinary catheter  - If patient has a chronic carmen, consider changing catheter if non-functioning  - Follow guidelines for intermittent irrigation of non-functioning urinary catheter  Outcome: Progressing     Problem: SKIN/TISSUE INTEGRITY - ADULT  Goal: Skin Integrity remains intact(Skin Breakdown Prevention)  Description: Assess:  -Perform Arnoldo assessment every shift  -Clean and moisturize skin every day  -Inspect skin when repositioning, toileting, and assisting with ADLS  -Assess extremities for adequate circulation and sensation     Bed Management:  -Have minimal linens on bed & keep smooth, unwrinkled  -Change linens as needed when moist or perspiring  -Avoid sitting or lying in one position for more than 2 hours while in bed  -Keep HOB at [address]:  -Offer bedside commode  -Assess for incontinence every   -Use incontinent care products after each incontinent episode such as foam    Activity:  -Mobilize patient 3 times a day  -Encourage activity and walks on unit  -Encourage or provide ROM exercises   -Turn and reposition patient every 2 Hours  -Use appropriate equipment to lift or move patient in bed  -Instruct/ Assist with weight shifting every 2 when out of bed in chair  -Consider limitation of chair time 1 hour intervals    Skin Care:  -Avoid use of baby powder, tape, friction and shearing, hot water or constrictive clothing  -Relieve pressure over bony prominences using wedges  -Do not massage red bony areas      Outcome: Progressing  Goal: Incision(s), wounds(s) or drain site(s) healing without S/S of infection  Description: INTERVENTIONS  - Assess and document dressing, incision, wound bed, drain sites and surrounding tissue  - Provide patient and family education  - Perform skin care/dressing changes every day  Outcome: Progressing  Goal: Pressure injury heals and does not worsen  Description: Interventions:  - Implement low air loss mattress or specialty surface (Criteria met)  - Apply silicone foam dressing  - Instruct/assist with weight shifting every 30 minutes when in chair   - Limit chair time to 1 hour intervals  - Use special pressure reducing interventions such as waffle when in chair   - Apply fecal or urinary incontinence containment device   - Perform passive or active ROM every day  - Turn and reposition patient & offload bony prominences every 2 hours   - Utilize friction reducing device or surface for transfers   - Consider consults to  interdisciplinary teams such as   - Use incontinent care products after each incontinent episode such as   - Consider nutrition services referral as needed  Outcome: Progressing

## 2022-07-16 NOTE — PROGRESS NOTES
Progress Note - Ruthie Cons 47 y o  male MRN: [id number]    Unit/Bed#: [facility] 607-01 Encounter: [id number]      Assessment:  Patient is a 72-year-old male with complex PMHx who presented w a SBO and ventral hernia now postop day 9 from an attempted STSG, I&D, washout and VAC placement on 06/30 and 2nd STSG on 07/05  Plan:  - Monitor wound manager output  - continue to monitor hemoglobin and WBC  - appreciate nephro recs: cleared, recommend repeat BMP next week  - continue multimodal pain and nausea control p r n   - Maintain nightly bipap as necessary for patient comfort  - please tiger text on-call surgery resident with questions/concerns    Subjective:   No acute events overnight  Vital signs stable  Uses 2 L oxygen overnight via nasal cannula routinely, but resting comfortably on room air when I went in this morning  Continues to tolerate carb controlled diet well without any N/V/D  Pain is well controlled on current regimen  Voiding bladder spontaneously  Having regular bowel movements  Wound manager in place over anterior midline wound with 25 cc of yellow, murky drainage  Dressing to anterior small puncture wound left of STSG site is C/D/I  Patient feels well overall  Reports no subjective fevers or chills  Denies headache, chest pain, shortness of breath, nausea, vomiting, and diarrhea  Patient resting comfortably in bed with blankets pulled over his head when I entered the room  Labs not back by time of note  Objective:     Vitals: Blood pressure 113/63, pulse 94, temperature 99 2 °F (37 3 °C), resp  rate 19, height 5' 11" (1 803 m), weight (!) 225 kg (495 lb 2 5 oz), SpO2 98 %  ,Body mass index is 69 06 kg/m²        Intake/Output Summary (Last 24 hours) at 7/16/2022 0645  Last data filed at 7/16/2022 0125  Gross per 24 hour   Intake 1293 84 ml   Output 902 ml   Net 391 84 ml       Physical Exam:  Gen: patient well appearing, no acute distress, resting comfortably in bed  CV: warm, well perfused  Lungs: normal work of breathing, no respiratory distress, on room air  Abd: soft, non-distended, wound manager intact w 25 cc of yellow drainage  Neuro: alert and oriented, MS grossly intact  MSK: moves all extremities normally, xeroform is coming off STSG donor site    Invasive Devices  Report    Peripherally Inserted Central Catheter Line  Duration           PICC Line 68/35/20 Right Basilic 32 days                Lab, Imaging and other studies: I have personally reviewed pertinent reports      VTE Pharmacologic Prophylaxis: Heparin gtt  VTE Mechanical Prophylaxis: sequential compression device     Nery Phillips MD  PGY1 Orthopedic Surgery

## 2022-07-17 LAB
ANION GAP SERPL CALCULATED.3IONS-SCNC: 3 MMOL/L (ref 4–13)
APTT PPP: 47 SECONDS (ref 23–37)
APTT PPP: 56 SECONDS (ref 23–37)
APTT PPP: 73 SECONDS (ref 23–37)
BUN SERPL-MCNC: 10 MG/DL (ref 5–25)
CALCIUM SERPL-MCNC: 9 MG/DL (ref 8.3–10.1)
CHLORIDE SERPL-SCNC: 98 MMOL/L (ref 100–108)
CO2 SERPL-SCNC: 32 MMOL/L (ref 21–32)
CREAT SERPL-MCNC: 0.62 MG/DL (ref 0.6–1.3)
ERYTHROCYTE [DISTWIDTH] IN BLOOD BY AUTOMATED COUNT: 17.2 % (ref 11.6–15.1)
GFR SERPL CREATININE-BSD FRML MDRD: 114 ML/MIN/1.73SQ M
GLUCOSE SERPL-MCNC: 106 MG/DL (ref 65–140)
GLUCOSE SERPL-MCNC: 108 MG/DL (ref 65–140)
GLUCOSE SERPL-MCNC: 119 MG/DL (ref 65–140)
GLUCOSE SERPL-MCNC: 120 MG/DL (ref 65–140)
GLUCOSE SERPL-MCNC: 91 MG/DL (ref 65–140)
HCT VFR BLD AUTO: 26.2 % (ref 36.5–49.3)
HGB BLD-MCNC: 7.6 G/DL (ref 12–17)
MCH RBC QN AUTO: 28.4 PG (ref 26.8–34.3)
MCHC RBC AUTO-ENTMCNC: 29 G/DL (ref 31.4–37.4)
MCV RBC AUTO: 98 FL (ref 82–98)
PLATELET # BLD AUTO: 197 THOUSANDS/UL (ref 149–390)
PMV BLD AUTO: 10 FL (ref 8.9–12.7)
POTASSIUM SERPL-SCNC: 4.6 MMOL/L (ref 3.5–5.3)
RBC # BLD AUTO: 2.68 MILLION/UL (ref 3.88–5.62)
SODIUM SERPL-SCNC: 133 MMOL/L (ref 136–145)
WBC # BLD AUTO: 5.87 THOUSAND/UL (ref 4.31–10.16)

## 2022-07-17 PROCEDURE — 85730 THROMBOPLASTIN TIME PARTIAL: CPT | Performed by: SURGERY

## 2022-07-17 PROCEDURE — 99024 POSTOP FOLLOW-UP VISIT: CPT | Performed by: SURGERY

## 2022-07-17 PROCEDURE — 94760 N-INVAS EAR/PLS OXIMETRY 1: CPT

## 2022-07-17 PROCEDURE — 85027 COMPLETE CBC AUTOMATED: CPT

## 2022-07-17 PROCEDURE — 94640 AIRWAY INHALATION TREATMENT: CPT

## 2022-07-17 PROCEDURE — 94660 CPAP INITIATION&MGMT: CPT

## 2022-07-17 PROCEDURE — 80048 BASIC METABOLIC PNL TOTAL CA: CPT

## 2022-07-17 PROCEDURE — 82948 REAGENT STRIP/BLOOD GLUCOSE: CPT

## 2022-07-17 RX ADMIN — IPRATROPIUM BROMIDE 0.5 MG: 0.5 SOLUTION RESPIRATORY (INHALATION) at 19:28

## 2022-07-17 RX ADMIN — BUDESONIDE AND FORMOTEROL FUMARATE DIHYDRATE 2 PUFF: 160; 4.5 AEROSOL RESPIRATORY (INHALATION) at 22:09

## 2022-07-17 RX ADMIN — ACETAMINOPHEN 975 MG: 325 TABLET, FILM COATED ORAL at 15:46

## 2022-07-17 RX ADMIN — LEVALBUTEROL HYDROCHLORIDE 1.25 MG: 1.25 SOLUTION, CONCENTRATE RESPIRATORY (INHALATION) at 07:11

## 2022-07-17 RX ADMIN — HEPARIN SODIUM 19 UNITS/KG/HR: 10000 INJECTION, SOLUTION INTRAVENOUS at 11:04

## 2022-07-17 RX ADMIN — SODIUM CHLORIDE TAB 1 GM 2 G: 1 TAB at 22:10

## 2022-07-17 RX ADMIN — GABAPENTIN 300 MG: 250 SOLUTION ORAL at 22:09

## 2022-07-17 RX ADMIN — HEPARIN SODIUM 5000 UNITS: 1000 INJECTION INTRAVENOUS; SUBCUTANEOUS at 02:59

## 2022-07-17 RX ADMIN — ACETAMINOPHEN 975 MG: 325 TABLET, FILM COATED ORAL at 05:01

## 2022-07-17 RX ADMIN — METHOCARBAMOL TABLETS 750 MG: 750 TABLET, COATED ORAL at 18:24

## 2022-07-17 RX ADMIN — GABAPENTIN 300 MG: 250 SOLUTION ORAL at 09:48

## 2022-07-17 RX ADMIN — METOCLOPRAMIDE HYDROCHLORIDE 10 MG: 5 INJECTION INTRAMUSCULAR; INTRAVENOUS at 00:30

## 2022-07-17 RX ADMIN — DIGOXIN 250 MCG: 250 TABLET ORAL at 09:48

## 2022-07-17 RX ADMIN — HYDROMORPHONE HYDROCHLORIDE 1 MG: 1 INJECTION, SOLUTION INTRAMUSCULAR; INTRAVENOUS; SUBCUTANEOUS at 11:02

## 2022-07-17 RX ADMIN — SODIUM CHLORIDE TAB 1 GM 2 G: 1 TAB at 15:46

## 2022-07-17 RX ADMIN — METHOCARBAMOL TABLETS 750 MG: 750 TABLET, COATED ORAL at 11:04

## 2022-07-17 RX ADMIN — GABAPENTIN 300 MG: 250 SOLUTION ORAL at 15:55

## 2022-07-17 RX ADMIN — LEVALBUTEROL HYDROCHLORIDE 1.25 MG: 1.25 SOLUTION, CONCENTRATE RESPIRATORY (INHALATION) at 19:28

## 2022-07-17 RX ADMIN — HYDROMORPHONE HYDROCHLORIDE 1 MG: 1 INJECTION, SOLUTION INTRAMUSCULAR; INTRAVENOUS; SUBCUTANEOUS at 16:20

## 2022-07-17 RX ADMIN — SODIUM CHLORIDE TAB 1 GM 2 G: 1 TAB at 09:48

## 2022-07-17 RX ADMIN — LEVOTHYROXINE SODIUM 25 MCG: 25 TABLET ORAL at 05:01

## 2022-07-17 RX ADMIN — QUETIAPINE FUMARATE 50 MG: 25 TABLET ORAL at 22:09

## 2022-07-17 RX ADMIN — METHOCARBAMOL TABLETS 750 MG: 750 TABLET, COATED ORAL at 05:01

## 2022-07-17 RX ADMIN — HEPARIN SODIUM 5000 UNITS: 1000 INJECTION INTRAVENOUS; SUBCUTANEOUS at 20:10

## 2022-07-17 RX ADMIN — ACETAMINOPHEN 975 MG: 325 TABLET, FILM COATED ORAL at 22:10

## 2022-07-17 RX ADMIN — METOPROLOL TARTRATE 25 MG: 25 TABLET, FILM COATED ORAL at 00:29

## 2022-07-17 RX ADMIN — IPRATROPIUM BROMIDE 0.5 MG: 0.5 SOLUTION RESPIRATORY (INHALATION) at 07:11

## 2022-07-17 RX ADMIN — METHOCARBAMOL TABLETS 750 MG: 750 TABLET, COATED ORAL at 00:29

## 2022-07-17 RX ADMIN — HEPARIN SODIUM 21 UNITS/KG/HR: 10000 INJECTION, SOLUTION INTRAVENOUS at 21:54

## 2022-07-17 RX ADMIN — METOCLOPRAMIDE HYDROCHLORIDE 10 MG: 5 INJECTION INTRAMUSCULAR; INTRAVENOUS at 05:01

## 2022-07-17 RX ADMIN — METOPROLOL TARTRATE 25 MG: 25 TABLET, FILM COATED ORAL at 09:48

## 2022-07-17 RX ADMIN — PANTOPRAZOLE SODIUM 20 MG: 20 TABLET, DELAYED RELEASE ORAL at 05:01

## 2022-07-17 RX ADMIN — OXYCODONE HYDROCHLORIDE 10 MG: 5 SOLUTION ORAL at 22:16

## 2022-07-17 NOTE — QUICK NOTE
General Surgery  Real Julien 47 y o  male MRN: [id number]  Unit/Bed#: [facility] 607-01 Encounter: [id number]     Wound Check Progress Note     Location of wound:   midline  See images below     Prior dressing was carefully removed  Wound was carefully inspected  Right inferior 3cm wound with packing irrigated copiously and repacked with 2in plain packing  Midline wound cleansed gently particularly surrounding area of EC fistula to right lateral aspect  Two pockets noted with purulent drainage/succus, unclear if 2/2 to [facility] fistula drainage  Irrigated pockets with NS  No sting barrier applied to surrounding skin  Images were taken and inserted into patient's chart  See below  Stoma paste applied to isolate EC fistula on R lateral aspect and wound manager applied over with caution to preserve graft surrounding  One 4x4 guaze placed inferior to wound manager to protect in case of drainage outside of area  Rest of abdominal wound redressed with adaptic sheets  Kerlex soaked with saline applied over adaptic  ABDs applied over place and secured with two pieces of tape  Due to complex wound management, wound care nursing consulted for assistance  Patient tolerated procedure well      Images:                  Latanya Lim MD  7/17/2022

## 2022-07-17 NOTE — PROGRESS NOTES
Progress Note - Gina Levy 47 y o  male MRN: [id number]    Unit/Bed#: [facility] 607-01 Encounter: [id number]    Assessment:  Patient is a 31-year-old male with complex PMHx who presented w/ a SBO and ventral hernia who is now post-op from an attempted STSG, I&D, washout and VAC placement on 06/30 and 2nd STSG on 07/05  Plan:  - Monitor wound manager output  - consult to wound care nursing  - BID dressing changes  - appreciate nephro recs: cleared, continue salt tabs 2g TID, recommend repeat BMP next week (7/20)  - continue multimodal pain and nausea control p r n   - Maintain nightly bipap as necessary for patient comfort    Subjective:   No acute events overnight  Wound manager in place, though some leaking along inferior aspect (approximately 20 cc fluid during day and evening yesterday)  No new concerns  Pain controlled, having BMs regularly  Voiding spontaneously  Objective:     Vitals: Blood pressure 115/59, pulse 74, temperature 99 3 °F (37 4 °C), resp  rate 16, height 5' 11" (1 803 m), weight (!) 225 kg (495 lb 2 5 oz), SpO2 98 %  ,Body mass index is 69 06 kg/m²        Intake/Output Summary (Last 24 hours) at 7/17/2022 0025  Last data filed at 7/16/2022 1621  Gross per 24 hour   Intake 1104 91 ml   Output 1330 ml   Net -225 09 ml       Physical Exam:  Gen: patient well appearing, no acute distress, resting comfortably in bed  CV: warm, well perfused  Lungs: normal work of breathing, no respiratory distress, on room air  Abd: soft, non-distended, wound manager intact w 25 cc of opaque yellow drainage, some leakage along inferior aspect of wound manager, abdominal dressing soaked through primarily along inferior 1/4 with yellow colored fluid  Neuro: alert and oriented, MS grossly intact  MSK: moves all extremities normally, xeroform is coming off STSG donor site    Invasive Devices  Report    Peripherally Inserted Central Catheter Line  Duration           PICC Line 32/43/78 Right Basilic 33 days Drain  Duration           Open Drain Medial RUQ 2 days                Lab, Imaging and other studies: I have personally reviewed pertinent reports      VTE Pharmacologic Prophylaxis: Heparin gtt  VTE Mechanical Prophylaxis: sequential compression device

## 2022-07-17 NOTE — PLAN OF CARE
Problem: Prexisting or High Potential for Compromised Skin Integrity  Goal: Skin integrity is maintained or improved  Description: INTERVENTIONS:  - Identify patients at risk for skin breakdown  - Assess and monitor skin integrity  - Assess and monitor nutrition and hydration status  - Monitor labs   - Assess for incontinence   - Turn and reposition patient  - Assist with mobility/ambulation  - Relieve pressure over bony prominences  - Avoid friction and shearing  - Provide appropriate hygiene as needed including keeping skin clean and dry  - Evaluate need for skin moisturizer/barrier cream  - Collaborate with interdisciplinary team   - Patient/family teaching  - Consider wound care consult   Outcome: Progressing     Problem: Potential for Falls  Goal: Patient will remain free of falls  Description: INTERVENTIONS:  - Educate patient/family on patient safety including physical limitations  - Instruct patient to call for assistance with activity   - Consult OT/PT to assist with strengthening/mobility   - Keep Call bell within reach  - Keep bed low and locked with side rails adjusted as appropriate  - Keep care items and personal belongings within reach  - Initiate and maintain comfort rounds  - Make Fall Risk Sign visible to staff  - Offer Toileting every 1 Hours, in advance of need  - Initiate/Maintain alarm  - Obtain necessary fall risk management equipment  - Apply yellow socks and bracelet for high fall risk patients  - Consider moving patient to room near nurses station  Outcome: Progressing     Problem: MOBILITY - ADULT  Goal: Maintain or return to baseline ADL function  Description: INTERVENTIONS:  -  Assess patient's ability to carry out ADLs; assess patient's baseline for ADL function and identify physical deficits which impact ability to perform ADLs (bathing, care of mouth/teeth, toileting, grooming, dressing, etc )  - Assess/evaluate cause of self-care deficits   - Assess range of motion  - Assess patient's mobility; develop plan if impaired  - Assess patient's need for assistive devices and provide as appropriate  - Encourage maximum independence but intervene and supervise when necessary  - Involve family in performance of ADLs  - Assess for home care needs following discharge   - Consider OT consult to assist with ADL evaluation and planning for discharge  - Provide patient education as appropriate  Outcome: Progressing  Goal: Maintains/Returns to pre admission functional level  Description: INTERVENTIONS:  - Perform BMAT or MOVE assessment daily    - Set and communicate daily mobility goal to care team and patient/family/caregiver  - Collaborate with rehabilitation services on mobility goals if consulted  - Perform Range of Motion 3 times a day  - Reposition patient every 2 hours  - Dangle patient 3 times a day  - Stand patient 3 times a day  - Ambulate patient 3 times a day  - Out of bed to chair 3 times a day   - Out of bed for meals 3 times a day  - Out of bed for toileting  - Record patient progress and toleration of activity level   Outcome: Progressing     Problem: Nutrition/Hydration-ADULT  Goal: Nutrient/Hydration intake appropriate for improving, restoring or maintaining nutritional needs  Description: Monitor and assess patient's nutrition/hydration status for malnutrition  Collaborate with interdisciplinary team and initiate plan and interventions as ordered  Monitor patient's weight and dietary intake as ordered or per policy  Utilize nutrition screening tool and intervene as necessary  Determine patient's food preferences and provide high-protein, high-caloric foods as appropriate       INTERVENTIONS:  - Monitor oral intake, urinary output, labs, and treatment plans  - Assess nutrition and hydration status and recommend course of action  - Evaluate amount of meals eaten  - Assist patient with eating if necessary   - Allow adequate time for meals  - Recommend/ encourage appropriate diets, oral nutritional supplements, and vitamin/mineral supplements  - Order, calculate, and assess calorie counts as needed  - Recommend, monitor, and adjust tube feedings and TPN/PPN based on assessed needs  - Assess need for intravenous fluids  - Provide specific nutrition/hydration education as appropriate  - Include patient/family/caregiver in decisions related to nutrition  Outcome: Progressing     Problem: PAIN - ADULT  Goal: Verbalizes/displays adequate comfort level or baseline comfort level  Description: Interventions:  - Encourage patient to monitor pain and request assistance  - Assess pain using appropriate pain scale  - Administer analgesics based on type and severity of pain and evaluate response  - Implement non-pharmacological measures as appropriate and evaluate response  - Consider cultural and social influences on pain and pain management  - Notify physician/advanced practitioner if interventions unsuccessful or patient reports new pain  Outcome: Progressing     Problem: INFECTION - ADULT  Goal: Absence or prevention of progression during hospitalization  Description: INTERVENTIONS:  - Assess and monitor for signs and symptoms of infection  - Monitor lab/diagnostic results  - Monitor all insertion sites, i e  indwelling lines, tubes, and drains  - Monitor endotracheal if appropriate and nasal secretions for changes in amount and color  - [place] appropriate cooling/warming therapies per order  - Administer medications as ordered  - Instruct and encourage patient and family to use good hand hygiene technique  - Identify and instruct in appropriate isolation precautions for identified infection/condition  Outcome: Progressing     Problem: DISCHARGE PLANNING  Goal: Discharge to home or other facility with appropriate resources  Description: INTERVENTIONS:  - Identify barriers to discharge w/patient and caregiver  - Arrange for needed discharge resources and transportation as appropriate  - Identify discharge learning needs (meds, wound care, etc )  - Arrange for interpretive services to assist at discharge as needed  - Refer to Case Management Department for coordinating discharge planning if the patient needs post-hospital services based on physician/advanced practitioner order or complex needs related to functional status, cognitive ability, or social support system  Outcome: Progressing     Problem: Knowledge Deficit  Goal: Patient/family/caregiver demonstrates understanding of disease process, treatment plan, medications, and discharge instructions  Description: Complete learning assessment and assess knowledge base    Interventions:  - Provide teaching at level of understanding  - Provide teaching via preferred learning methods  Outcome: Progressing     Problem: CARDIOVASCULAR - ADULT  Goal: Maintains optimal cardiac output and hemodynamic stability  Description: INTERVENTIONS:  - Monitor I/O, vital signs and rhythm  - Monitor for S/S and trends of decreased cardiac output  - Administer and titrate ordered vasoactive medications to optimize hemodynamic stability  - Assess quality of pulses, skin color and temperature  - Assess for signs of decreased coronary artery perfusion  - Instruct patient to report change in severity of symptoms  Outcome: Progressing  Goal: Absence of cardiac dysrhythmias or at baseline rhythm  Description: INTERVENTIONS:  - Continuous cardiac monitoring, vital signs, obtain 12 lead EKG if ordered  - Administer antiarrhythmic and heart rate control medications as ordered  - Monitor electrolytes and administer replacement therapy as ordered  Outcome: Progressing     Problem: RESPIRATORY - ADULT  Goal: Achieves optimal ventilation and oxygenation  Description: INTERVENTIONS:  - Assess for changes in respiratory status  - Assess for changes in mentation and behavior  - Position to facilitate oxygenation and minimize respiratory effort  - Oxygen administered by appropriate delivery if ordered  - Initiate smoking cessation education as indicated  - Encourage broncho-pulmonary hygiene including cough, deep breathe, Incentive Spirometry  - Assess the need for suctioning and aspirate as needed  - Assess and instruct to report SOB or any respiratory difficulty  - Respiratory Therapy support as indicated  Outcome: Progressing     Problem: GASTROINTESTINAL - ADULT  Goal: Minimal or absence of nausea and/or vomiting  Description: INTERVENTIONS:  - Administer IV fluids if ordered to ensure adequate hydration  - Maintain NPO status until nausea and vomiting are resolved  - Nasogastric tube if ordered  - Administer ordered antiemetic medications as needed  - Provide nonpharmacologic comfort measures as appropriate  - Advance diet as tolerated, if ordered  - Consider nutrition services referral to assist patient with adequate nutrition and appropriate food choices  Outcome: Progressing  Goal: Maintains or returns to baseline bowel function  Description: INTERVENTIONS:  - Assess bowel function  - Encourage oral fluids to ensure adequate hydration  - Administer IV fluids if ordered to ensure adequate hydration  - Administer ordered medications as needed  - Encourage mobilization and activity  - Consider nutritional services referral to assist patient with adequate nutrition and appropriate food choices  Outcome: Progressing  Goal: Maintains adequate nutritional intake  Description: INTERVENTIONS:  - Monitor percentage of each meal consumed  - Identify factors contributing to decreased intake, treat as appropriate  - Assist with meals as needed  - Monitor I&O, weight, and lab values if indicated  - Obtain nutrition services referral as needed  Outcome: Progressing  Goal: Establish and maintain optimal ostomy function  Description: INTERVENTIONS:  - Assess bowel function  - Encourage oral fluids to ensure adequate hydration  - Administer IV fluids if ordered to ensure adequate hydration   - Administer ordered medications as needed  - Encourage mobilization and activity  - Nutrition services referral to assist patient with appropriate food choices  - Assess stoma site  - Consider wound care consult   Outcome: Progressing  Goal: Oral mucous membranes remain intact  Description: INTERVENTIONS  - Assess oral mucosa and hygiene practices  - Implement preventative oral hygiene regimen  - Implement oral medicated treatments as ordered  - Initiate Nutrition services referral as needed  Outcome: Progressing     Problem: GENITOURINARY - ADULT  Goal: Maintains or returns to baseline urinary function  Description: INTERVENTIONS:  - Assess urinary function  - Encourage oral fluids to ensure adequate hydration if ordered  - Administer IV fluids as ordered to ensure adequate hydration  - Administer ordered medications as needed  - Offer frequent toileting  - Follow urinary retention protocol if ordered  Outcome: Progressing  Goal: Absence of urinary retention  Description: INTERVENTIONS:  - Assess patients ability to void and empty bladder  - Monitor I/O  - Bladder scan as needed  - Discuss with physician/AP medications to alleviate retention as needed  - Discuss catheterization for long term situations as appropriate  Outcome: Progressing  Goal: Urinary catheter remains patent  Description: INTERVENTIONS:  - Assess patency of urinary catheter  - If patient has a chronic carmen, consider changing catheter if non-functioning  - Follow guidelines for intermittent irrigation of non-functioning urinary catheter  Outcome: Progressing     Problem: SKIN/TISSUE INTEGRITY - ADULT  Goal: Skin Integrity remains intact(Skin Breakdown Prevention)  Description: Assess:  -Perform Arnoldo assessment every shift  -Clean and moisturize skin every day  -Inspect skin when repositioning, toileting, and assisting with ADLS  -Assess extremities for adequate circulation and sensation     Bed Management:  -Have minimal linens on bed & keep smooth, unwrinkled  -Change linens as needed when moist or perspiring  -Avoid sitting or lying in one position for more than 2 hours while in bed  -Keep HOB at [address]:  -Offer bedside commode  -Assess for incontinence every   -Use incontinent care products after each incontinent episode such as foam    Activity:  -Mobilize patient 3 times a day  -Encourage activity and walks on unit  -Encourage or provide ROM exercises   -Turn and reposition patient every 2 Hours  -Use appropriate equipment to lift or move patient in bed  -Instruct/ Assist with weight shifting every 2 when out of bed in chair  -Consider limitation of chair time 1 hour intervals    Skin Care:  -Avoid use of baby powder, tape, friction and shearing, hot water or constrictive clothing  -Relieve pressure over bony prominences using wedges  -Do not massage red bony areas      Outcome: Progressing  Goal: Incision(s), wounds(s) or drain site(s) healing without S/S of infection  Description: INTERVENTIONS  - Assess and document dressing, incision, wound bed, drain sites and surrounding tissue  - Provide patient and family education  - Perform skin care/dressing changes every day  Outcome: Progressing  Goal: Pressure injury heals and does not worsen  Description: Interventions:  - Implement low air loss mattress or specialty surface (Criteria met)  - Apply silicone foam dressing  - Instruct/assist with weight shifting every 30 minutes when in chair   - Limit chair time to 1 hour intervals  - Use special pressure reducing interventions such as waffle when in chair   - Apply fecal or urinary incontinence containment device   - Perform passive or active ROM every day  - Turn and reposition patient & offload bony prominences every 2 hours   - Utilize friction reducing device or surface for transfers   - Consider consults to  interdisciplinary teams such as   - Use incontinent care products after each incontinent episode such as   - Consider nutrition services referral as needed  Outcome: Progressing

## 2022-07-18 LAB
ANION GAP SERPL CALCULATED.3IONS-SCNC: 3 MMOL/L (ref 4–13)
ANISOCYTOSIS BLD QL SMEAR: PRESENT
APTT PPP: 133 SECONDS (ref 23–37)
BASOPHILS # BLD MANUAL: 0 THOUSAND/UL (ref 0–0.1)
BASOPHILS NFR MAR MANUAL: 0 % (ref 0–1)
BUN SERPL-MCNC: 8 MG/DL (ref 5–25)
CALCIUM SERPL-MCNC: 8.7 MG/DL (ref 8.3–10.1)
CHLORIDE SERPL-SCNC: 100 MMOL/L (ref 100–108)
CO2 SERPL-SCNC: 30 MMOL/L (ref 21–32)
CREAT SERPL-MCNC: 0.54 MG/DL (ref 0.6–1.3)
EOSINOPHIL # BLD MANUAL: 0.04 THOUSAND/UL (ref 0–0.4)
EOSINOPHIL NFR BLD MANUAL: 1 % (ref 0–6)
ERYTHROCYTE [DISTWIDTH] IN BLOOD BY AUTOMATED COUNT: 17.1 % (ref 11.6–15.1)
GFR SERPL CREATININE-BSD FRML MDRD: 121 ML/MIN/1.73SQ M
GLUCOSE SERPL-MCNC: 115 MG/DL (ref 65–140)
GLUCOSE SERPL-MCNC: 117 MG/DL (ref 65–140)
GLUCOSE SERPL-MCNC: 140 MG/DL (ref 65–140)
GLUCOSE SERPL-MCNC: 97 MG/DL (ref 65–140)
HCT VFR BLD AUTO: 24.3 % (ref 36.5–49.3)
HGB BLD-MCNC: 7.1 G/DL (ref 12–17)
HYPERCHROMIA BLD QL SMEAR: PRESENT
LYMPHOCYTES # BLD AUTO: 1.1 THOUSAND/UL (ref 0.6–4.47)
LYMPHOCYTES # BLD AUTO: 27 % (ref 14–44)
MAGNESIUM SERPL-MCNC: 2.1 MG/DL (ref 1.6–2.6)
MCH RBC QN AUTO: 28.1 PG (ref 26.8–34.3)
MCHC RBC AUTO-ENTMCNC: 29.2 G/DL (ref 31.4–37.4)
MCV RBC AUTO: 96 FL (ref 82–98)
MONOCYTES # BLD AUTO: 0.12 THOUSAND/UL (ref 0–1.22)
MONOCYTES NFR BLD: 3 % (ref 4–12)
NEUTROPHILS # BLD MANUAL: 2.82 THOUSAND/UL (ref 1.85–7.62)
NEUTS BAND NFR BLD MANUAL: 4 % (ref 0–8)
NEUTS SEG NFR BLD AUTO: 65 % (ref 43–75)
NRBC BLD AUTO-RTO: 2 /100 WBC (ref 0–2)
PHOSPHATE SERPL-MCNC: 3.8 MG/DL (ref 2.7–4.5)
PLATELET # BLD AUTO: 169 THOUSANDS/UL (ref 149–390)
PLATELET BLD QL SMEAR: ABNORMAL
PMV BLD AUTO: 10 FL (ref 8.9–12.7)
POLYCHROMASIA BLD QL SMEAR: PRESENT
POTASSIUM SERPL-SCNC: 4.4 MMOL/L (ref 3.5–5.3)
RBC # BLD AUTO: 2.53 MILLION/UL (ref 3.88–5.62)
RBC MORPH BLD: PRESENT
SODIUM SERPL-SCNC: 133 MMOL/L (ref 136–145)
WBC # BLD AUTO: 4.08 THOUSAND/UL (ref 4.31–10.16)

## 2022-07-18 PROCEDURE — 84100 ASSAY OF PHOSPHORUS: CPT | Performed by: STUDENT IN AN ORGANIZED HEALTH CARE EDUCATION/TRAINING PROGRAM

## 2022-07-18 PROCEDURE — 85730 THROMBOPLASTIN TIME PARTIAL: CPT | Performed by: SURGERY

## 2022-07-18 PROCEDURE — 94640 AIRWAY INHALATION TREATMENT: CPT

## 2022-07-18 PROCEDURE — 99024 POSTOP FOLLOW-UP VISIT: CPT | Performed by: SURGERY

## 2022-07-18 PROCEDURE — 80048 BASIC METABOLIC PNL TOTAL CA: CPT | Performed by: STUDENT IN AN ORGANIZED HEALTH CARE EDUCATION/TRAINING PROGRAM

## 2022-07-18 PROCEDURE — 83735 ASSAY OF MAGNESIUM: CPT | Performed by: STUDENT IN AN ORGANIZED HEALTH CARE EDUCATION/TRAINING PROGRAM

## 2022-07-18 PROCEDURE — 85007 BL SMEAR W/DIFF WBC COUNT: CPT | Performed by: STUDENT IN AN ORGANIZED HEALTH CARE EDUCATION/TRAINING PROGRAM

## 2022-07-18 PROCEDURE — 94760 N-INVAS EAR/PLS OXIMETRY 1: CPT

## 2022-07-18 PROCEDURE — 94660 CPAP INITIATION&MGMT: CPT

## 2022-07-18 PROCEDURE — 85027 COMPLETE CBC AUTOMATED: CPT | Performed by: STUDENT IN AN ORGANIZED HEALTH CARE EDUCATION/TRAINING PROGRAM

## 2022-07-18 PROCEDURE — 82948 REAGENT STRIP/BLOOD GLUCOSE: CPT

## 2022-07-18 RX ORDER — POLYETHYLENE GLYCOL 3350 17 G/17G
17 POWDER, FOR SOLUTION ORAL DAILY PRN
Status: DISCONTINUED | OUTPATIENT
Start: 2022-07-18 | End: 2022-07-19 | Stop reason: HOSPADM

## 2022-07-18 RX ORDER — HYDROMORPHONE HCL/PF 1 MG/ML
0.5 SYRINGE (ML) INJECTION EVERY 6 HOURS PRN
Status: DISCONTINUED | OUTPATIENT
Start: 2022-07-18 | End: 2022-07-19

## 2022-07-18 RX ORDER — DABIGATRAN ETEXILATE 150 MG/1
150 CAPSULE ORAL 2 TIMES DAILY
Status: DISCONTINUED | OUTPATIENT
Start: 2022-07-18 | End: 2022-07-19 | Stop reason: HOSPADM

## 2022-07-18 RX ADMIN — SODIUM CHLORIDE TAB 1 GM 2 G: 1 TAB at 15:00

## 2022-07-18 RX ADMIN — METOCLOPRAMIDE HYDROCHLORIDE 10 MG: 5 INJECTION INTRAMUSCULAR; INTRAVENOUS at 05:33

## 2022-07-18 RX ADMIN — LEVOTHYROXINE SODIUM 25 MCG: 25 TABLET ORAL at 05:34

## 2022-07-18 RX ADMIN — HEPARIN SODIUM 18 UNITS/KG/HR: 10000 INJECTION, SOLUTION INTRAVENOUS at 11:24

## 2022-07-18 RX ADMIN — ACETAMINOPHEN 975 MG: 325 TABLET, FILM COATED ORAL at 14:57

## 2022-07-18 RX ADMIN — OXYCODONE HYDROCHLORIDE 10 MG: 5 SOLUTION ORAL at 05:31

## 2022-07-18 RX ADMIN — QUETIAPINE FUMARATE 50 MG: 25 TABLET ORAL at 21:37

## 2022-07-18 RX ADMIN — HEPARIN SODIUM 18 UNITS/KG/HR: 10000 INJECTION, SOLUTION INTRAVENOUS at 06:32

## 2022-07-18 RX ADMIN — ACETAMINOPHEN 975 MG: 325 TABLET, FILM COATED ORAL at 05:34

## 2022-07-18 RX ADMIN — ACETAMINOPHEN 975 MG: 325 TABLET, FILM COATED ORAL at 21:37

## 2022-07-18 RX ADMIN — GABAPENTIN 300 MG: 250 SOLUTION ORAL at 20:52

## 2022-07-18 RX ADMIN — LEVALBUTEROL HYDROCHLORIDE 1.25 MG: 1.25 SOLUTION, CONCENTRATE RESPIRATORY (INHALATION) at 07:39

## 2022-07-18 RX ADMIN — IPRATROPIUM BROMIDE 0.5 MG: 0.5 SOLUTION RESPIRATORY (INHALATION) at 07:39

## 2022-07-18 RX ADMIN — METOPROLOL TARTRATE 25 MG: 25 TABLET, FILM COATED ORAL at 08:30

## 2022-07-18 RX ADMIN — METOCLOPRAMIDE HYDROCHLORIDE 10 MG: 5 INJECTION INTRAMUSCULAR; INTRAVENOUS at 00:09

## 2022-07-18 RX ADMIN — GABAPENTIN 300 MG: 250 SOLUTION ORAL at 08:30

## 2022-07-18 RX ADMIN — METHOCARBAMOL TABLETS 750 MG: 750 TABLET, COATED ORAL at 11:36

## 2022-07-18 RX ADMIN — METOPROLOL TARTRATE 25 MG: 25 TABLET, FILM COATED ORAL at 00:11

## 2022-07-18 RX ADMIN — GABAPENTIN 300 MG: 250 SOLUTION ORAL at 17:43

## 2022-07-18 RX ADMIN — LEVALBUTEROL HYDROCHLORIDE 1.25 MG: 1.25 SOLUTION, CONCENTRATE RESPIRATORY (INHALATION) at 20:20

## 2022-07-18 RX ADMIN — BUDESONIDE AND FORMOTEROL FUMARATE DIHYDRATE 2 PUFF: 160; 4.5 AEROSOL RESPIRATORY (INHALATION) at 08:31

## 2022-07-18 RX ADMIN — METHOCARBAMOL TABLETS 750 MG: 750 TABLET, COATED ORAL at 00:09

## 2022-07-18 RX ADMIN — SODIUM CHLORIDE TAB 1 GM 2 G: 1 TAB at 20:52

## 2022-07-18 RX ADMIN — BUDESONIDE AND FORMOTEROL FUMARATE DIHYDRATE 2 PUFF: 160; 4.5 AEROSOL RESPIRATORY (INHALATION) at 20:53

## 2022-07-18 RX ADMIN — HYDROMORPHONE HYDROCHLORIDE 0.5 MG: 1 INJECTION, SOLUTION INTRAMUSCULAR; INTRAVENOUS; SUBCUTANEOUS at 11:36

## 2022-07-18 RX ADMIN — METOPROLOL TARTRATE 25 MG: 25 TABLET, FILM COATED ORAL at 15:00

## 2022-07-18 RX ADMIN — DIGOXIN 250 MCG: 250 TABLET ORAL at 08:30

## 2022-07-18 RX ADMIN — SODIUM CHLORIDE TAB 1 GM 2 G: 1 TAB at 08:30

## 2022-07-18 RX ADMIN — DABIGATRAN ETEXILATE MESYLATE 150 MG: 150 CAPSULE ORAL at 20:53

## 2022-07-18 RX ADMIN — METHOCARBAMOL TABLETS 750 MG: 750 TABLET, COATED ORAL at 17:43

## 2022-07-18 RX ADMIN — IPRATROPIUM BROMIDE 0.5 MG: 0.5 SOLUTION RESPIRATORY (INHALATION) at 20:20

## 2022-07-18 RX ADMIN — METHOCARBAMOL TABLETS 750 MG: 750 TABLET, COATED ORAL at 05:34

## 2022-07-18 RX ADMIN — DABIGATRAN ETEXILATE MESYLATE 150 MG: 150 CAPSULE ORAL at 14:57

## 2022-07-18 RX ADMIN — PANTOPRAZOLE SODIUM 20 MG: 20 TABLET, DELAYED RELEASE ORAL at 05:34

## 2022-07-18 NOTE — PROGRESS NOTES
Progress Note - Jayro Pruitt 47 y o  male MRN: [id number]    Unit/Bed#: [facility] 331-95 Encounter: [id number]      Assessment:  Jayro Pruitt is a 47 y o  male who p/w SBO 2/2 chronic incarcerated ventral hernia s/p:  STSG, I&D, w/o and repair of serosal tears on 6/14  w/o, abthera, [facility] 6/15  bridging vicryl mesh, wound vac placement 6/17  I&D w/o and vac placement 6/30  STSG 7/5    EC fistula w/wound mgr: 20/12 hrs yesterday, none recorded second shift, likely not recorded well as dressing was not adherent well on exam for dressing change yesterday    Plan:  Wound care consult for assistance with EC fistula wound care management  BID dressing changes in interim by surgical team  Continue to monitor and measure output of EC fistula   If high output would consider NPO TPN  Nephro consulted, appreciate recs  Pain and nausea control PRN  Hep gtt    Subjective:   Dressing change yesterday and reapplication of wound manager  Patient tolerated well without issue  This morning dressing is clean appearing and wound manager is working effectively  Will closely monitor  Patient denies any new acute concerns  No fevers, chills, nausea, emesis, CP or SOB  Tolerating diet  Objective:     Vitals: Blood pressure 118/56, pulse 81, temperature 99 2 °F (37 3 °C), resp  rate 20, height 5' 11" (1 803 m), weight (!) 225 kg (495 lb 2 5 oz), SpO2 98 %  ,Body mass index is 69 06 kg/m²        Intake/Output Summary (Last 24 hours) at 7/18/2022 0858  Last data filed at 7/17/2022 2100  Gross per 24 hour   Intake 560 ml   Output 550 ml   Net 10 ml       Physical Exam:   General - no acute distress, responsive and cooperative  CV - warm, regular rate  Pulm - normal work of breathing, no respiratory distress  Abd - soft, midline wound with right sided wound manager over EC fistula with succus output, STSG over midline w/majority well taken  Neuro - m/s grossly intact, cn grossly intact  Ext - moving all extremities       Invasive Devices  Report Peripherally Inserted Central Catheter Line  Duration           PICC Line 90/42/81 Right Basilic 34 days          Drain  Duration           Open Drain Medial RUQ 3 days                Lab, Imaging and other studies: I have personally reviewed pertinent reports      VTE Pharmacologic Prophylaxis: Heparin  VTE Mechanical Prophylaxis: sequential compression device

## 2022-07-18 NOTE — QUICK NOTE
Nurse-Patient-Provider rounds were completed with the patient's nurse today, Asad Chance  We discussed the plan is to discontinue heparin drip and discontinue IV Reglan today  Will initiate patient on Pradaxa 150 mg b i d   This was confirmed previously during his hospital course  We will work towards discharge the patient  Wound Care following  Wound care team will evaluate fistula and wound manager on 07/19/2022 at bedside       We reviewed all of the invasive devices/lines/telemetry orders   - PICC line in place  DVT Prophylaxis:  - SCDs and Pradaxa    Pain Assessment / Plan:  - Continue current analgesic regimen  Mobility Assessment / Plan:  - Activity as tolerated  Goals / Barriers for discharge:  - DC planning   - continue local wound care  - discontinue heparin drip  Case management following; case and discharge needs discussed  All questions and concerns were addressed  I spent greater than 8 minutes reviewing the plan with the patient and the nurse, and coordinating care for the day      Ja Brennan PA-C  7/18/2022

## 2022-07-18 NOTE — WOUND OSTOMY CARE
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 off the main abdominal wound  Wound care was consulted for assistance with fistula management  Wound manager placed on right lower aspect of abdominal wound- appears well adhered  Fistula is pack and wound manager is covering the fistula  Per primary RN fistula is minimal output  Wound will continue to follow patient for [facility] fistula management  [facility] RN discussed PA from General Surgery about wound manager and abdominal wound  [id number] Team recommends placing xeroform and ABDs on abdominal wound  Dressing to be changed daily  Patient reports generalized pain in stomach during assessment  Wound care will continue to evaluate patient  Assessment Findings:   1  Abdomen - full thickness skin loss with multiple skin grafts evident on wound base  Wound base is beefy red with epithelialization noted  Morena-wound is erythematic and fragile  No drainage noted  Placed xeroform and ABDs on wound bed  Order placed and wound care instructions placed below  2  Posterior neck wound: full thickness dehisced sutured wound with beefy red, yellow/ sloughy wound bed  Morena-wound is fragile and pink  3 Sutures noted around back of neck  Asked PA Herman Jacobs to remove sutures  Sutures do not appear to be adhering skin together and are located on the outside wound edge  Small to Moderate serosanguineous drainage noted  Wound dressed per orders below         No induration, fluctuance, odor, warmth/temperature differences, redness, or purulence noted to the above noted wounds and skin areas assessed  New dressings applied per orders listed below  Patient tolerated well- no s/s of non-verbal pain or discomfort observed during the encounter  Bedside nurse aware of plan of care  See flow sheets for more detailed assessment findings  Orders listed below and wound care will continue to follow, call or tiger text with questions  Skin care plans:  1-Hydraguard to bilateral sacrum, buttock and heels BID and PRN  2-Elevate heels to offload pressure  3-Ehob cushion in chair when out of bed  4-Moisturize skin daily with skin nourishing cream   5-Turn/reposition q2h or when medically stable for pressure re-distribution on skin     6-Irrigate wound to posterior neck with normal saline, apply silver alginate, cover with Allevyn foam  Change every other day and PRN soilage/displacement  7-Cleanse wound with NS and pat dry  Place xeroform over wound bed and cover with ABD  Secure with Tape  Change Daily or PRN soilage or displacement      Wounds:  Wound 06/15/22 Neck Posterior (Active)   Wound Image   07/18/22 1035   Wound Description EVELIA 07/18/22 1344   Morena-wound Assessment EVELIA 07/18/22 1344   Wound Length (cm) 0 5 cm 07/18/22 1035   Wound Width (cm) 0 5 cm 07/18/22 1035   Wound Depth (cm) 0 5 cm 07/18/22 1035   Wound Surface Area (cm^2) 0 25 cm^2 07/18/22 1035   Wound Volume (cm^3) 0 125 cm^3 07/18/22 1035   Calculated Wound Volume (cm^3) 0 13 cm^3 07/18/22 1035   Change in Wound Size % 78 33 07/18/22 1035   Tunneling 0 cm 07/18/22 1035   Tunneling in depth located at 0 07/18/22 1035   Undermining 0 07/18/22 1035   Undermining is depth extending from 0 07/18/22 1035   Wound Site Closure Sutures 07/18/22 1035   Drainage Amount Moderate 07/18/22 1035   Drainage Description Serosanguineous 07/18/22 1035   Non-staged Wound Description Full thickness 07/18/22 1035   Treatments Cleansed 07/18/22 1035 Dressing Foam, Silicon (eg  Allevyn, etc) 07/18/22 1344   Wound packed? No 07/18/22 1035   Packing- # removed 0 07/18/22 1035   Packing- # inserted 0 07/18/22 1035   Dressing Changed New 07/18/22 1035   Patient Tolerance Tolerated well 07/18/22 1035   Dressing Status Clean;Dry; Intact 07/18/22 1344       Wound 06/17/22 Abdomen N/A (Active)   Wound Description Beefy red;Epithelialization 07/18/22 1344   Morena-wound Assessment Erythema 07/18/22 1344   Drainage Amount None 07/18/22 1035   Non-staged Wound Description Full thickness 07/18/22 1035   Treatments Cleansed;Site care 07/18/22 1035   Dressing Xeroform, ABD 07/18/22 1035   Wound packed? No 07/18/22 1035   Packing- # removed 0 07/18/22 1035   Packing- # inserted 0 07/18/22 1035   Dressing Changed New 07/18/22 1035   Patient Tolerance Tolerated well 07/18/22 1035   Dressing Status Clean;Dry; Intact 07/18/22 1035         Patient seen with Merced Kent RN, BSN, Zi Garcia RN, BSN

## 2022-07-18 NOTE — PLAN OF CARE
Problem: Prexisting or High Potential for Compromised Skin Integrity  Goal: Skin integrity is maintained or improved  Description: INTERVENTIONS:  - Identify patients at risk for skin breakdown  - Assess and monitor skin integrity  - Assess and monitor nutrition and hydration status  - Monitor labs   - Assess for incontinence   - Turn and reposition patient  - Assist with mobility/ambulation  - Relieve pressure over bony prominences  - Avoid friction and shearing  - Provide appropriate hygiene as needed including keeping skin clean and dry  - Evaluate need for skin moisturizer/barrier cream  - Collaborate with interdisciplinary team   - Patient/family teaching  - Consider wound care consult   Outcome: Progressing     Problem: Potential for Falls  Goal: Patient will remain free of falls  Description: INTERVENTIONS:  - Educate patient/family on patient safety including physical limitations  - Instruct patient to call for assistance with activity   - Consult OT/PT to assist with strengthening/mobility   - Keep Call bell within reach  - Keep bed low and locked with side rails adjusted as appropriate  - Keep care items and personal belongings within reach  - Initiate and maintain comfort rounds  - Make Fall Risk Sign visible to staff  - Offer Toileting every 1 Hours, in advance of need  - Initiate/Maintain alarm  - Obtain necessary fall risk management equipment  - Apply yellow socks and bracelet for high fall risk patients  - Consider moving patient to room near nurses station  Outcome: Progressing     Problem: MOBILITY - ADULT  Goal: Maintain or return to baseline ADL function  Description: INTERVENTIONS:  -  Assess patient's ability to carry out ADLs; assess patient's baseline for ADL function and identify physical deficits which impact ability to perform ADLs (bathing, care of mouth/teeth, toileting, grooming, dressing, etc )  - Assess/evaluate cause of self-care deficits   - Assess range of motion  - Assess patient's mobility; develop plan if impaired  - Assess patient's need for assistive devices and provide as appropriate  - Encourage maximum independence but intervene and supervise when necessary  - Involve family in performance of ADLs  - Assess for home care needs following discharge   - Consider OT consult to assist with ADL evaluation and planning for discharge  - Provide patient education as appropriate  Outcome: Progressing  Goal: Maintains/Returns to pre admission functional level  Description: INTERVENTIONS:  - Perform BMAT or MOVE assessment daily    - Set and communicate daily mobility goal to care team and patient/family/caregiver  - Collaborate with rehabilitation services on mobility goals if consulted  - Perform Range of Motion 3 times a day  - Reposition patient every 2 hours  - Dangle patient 3 times a day  - Stand patient 3 times a day  - Ambulate patient 3 times a day  - Out of bed to chair 3 times a day   - Out of bed for meals 3 times a day  - Out of bed for toileting  - Record patient progress and toleration of activity level   Outcome: Progressing     Problem: Nutrition/Hydration-ADULT  Goal: Nutrient/Hydration intake appropriate for improving, restoring or maintaining nutritional needs  Description: Monitor and assess patient's nutrition/hydration status for malnutrition  Collaborate with interdisciplinary team and initiate plan and interventions as ordered  Monitor patient's weight and dietary intake as ordered or per policy  Utilize nutrition screening tool and intervene as necessary  Determine patient's food preferences and provide high-protein, high-caloric foods as appropriate       INTERVENTIONS:  - Monitor oral intake, urinary output, labs, and treatment plans  - Assess nutrition and hydration status and recommend course of action  - Evaluate amount of meals eaten  - Assist patient with eating if necessary   - Allow adequate time for meals  - Recommend/ encourage appropriate diets, oral nutritional supplements, and vitamin/mineral supplements  - Order, calculate, and assess calorie counts as needed  - Recommend, monitor, and adjust tube feedings and TPN/PPN based on assessed needs  - Assess need for intravenous fluids  - Provide specific nutrition/hydration education as appropriate  - Include patient/family/caregiver in decisions related to nutrition  Outcome: Progressing     Problem: PAIN - ADULT  Goal: Verbalizes/displays adequate comfort level or baseline comfort level  Description: Interventions:  - Encourage patient to monitor pain and request assistance  - Assess pain using appropriate pain scale  - Administer analgesics based on type and severity of pain and evaluate response  - Implement non-pharmacological measures as appropriate and evaluate response  - Consider cultural and social influences on pain and pain management  - Notify physician/advanced practitioner if interventions unsuccessful or patient reports new pain  Outcome: Progressing     Problem: INFECTION - ADULT  Goal: Absence or prevention of progression during hospitalization  Description: INTERVENTIONS:  - Assess and monitor for signs and symptoms of infection  - Monitor lab/diagnostic results  - Monitor all insertion sites, i e  indwelling lines, tubes, and drains  - Monitor endotracheal if appropriate and nasal secretions for changes in amount and color  - Hibbs appropriate cooling/warming therapies per order  - Administer medications as ordered  - Instruct and encourage patient and family to use good hand hygiene technique  - Identify and instruct in appropriate isolation precautions for identified infection/condition  Outcome: Progressing     Problem: DISCHARGE PLANNING  Goal: Discharge to home or other facility with appropriate resources  Description: INTERVENTIONS:  - Identify barriers to discharge w/patient and caregiver  - Arrange for needed discharge resources and transportation as appropriate  - Identify discharge learning needs (meds, wound care, etc )  - Arrange for interpretive services to assist at discharge as needed  - Refer to Case Management Department for coordinating discharge planning if the patient needs post-hospital services based on physician/advanced practitioner order or complex needs related to functional status, cognitive ability, or social support system  Outcome: Progressing     Problem: Knowledge Deficit  Goal: Patient/family/caregiver demonstrates understanding of disease process, treatment plan, medications, and discharge instructions  Description: Complete learning assessment and assess knowledge base    Interventions:  - Provide teaching at level of understanding  - Provide teaching via preferred learning methods  Outcome: Progressing     Problem: CARDIOVASCULAR - ADULT  Goal: Maintains optimal cardiac output and hemodynamic stability  Description: INTERVENTIONS:  - Monitor I/O, vital signs and rhythm  - Monitor for S/S and trends of decreased cardiac output  - Administer and titrate ordered vasoactive medications to optimize hemodynamic stability  - Assess quality of pulses, skin color and temperature  - Assess for signs of decreased coronary artery perfusion  - Instruct patient to report change in severity of symptoms  Outcome: Progressing  Goal: Absence of cardiac dysrhythmias or at baseline rhythm  Description: INTERVENTIONS:  - Continuous cardiac monitoring, vital signs, obtain 12 lead EKG if ordered  - Administer antiarrhythmic and heart rate control medications as ordered  - Monitor electrolytes and administer replacement therapy as ordered  Outcome: Progressing     Problem: RESPIRATORY - ADULT  Goal: Achieves optimal ventilation and oxygenation  Description: INTERVENTIONS:  - Assess for changes in respiratory status  - Assess for changes in mentation and behavior  - Position to facilitate oxygenation and minimize respiratory effort  - Oxygen administered by appropriate delivery if ordered  - Initiate smoking cessation education as indicated  - Encourage broncho-pulmonary hygiene including cough, deep breathe, Incentive Spirometry  - Assess the need for suctioning and aspirate as needed  - Assess and instruct to report SOB or any respiratory difficulty  - Respiratory Therapy support as indicated  Outcome: Progressing     Problem: GASTROINTESTINAL - ADULT  Goal: Minimal or absence of nausea and/or vomiting  Description: INTERVENTIONS:  - Administer IV fluids if ordered to ensure adequate hydration  - Maintain NPO status until nausea and vomiting are resolved  - Nasogastric tube if ordered  - Administer ordered antiemetic medications as needed  - Provide nonpharmacologic comfort measures as appropriate  - Advance diet as tolerated, if ordered  - Consider nutrition services referral to assist patient with adequate nutrition and appropriate food choices  Outcome: Progressing  Goal: Maintains or returns to baseline bowel function  Description: INTERVENTIONS:  - Assess bowel function  - Encourage oral fluids to ensure adequate hydration  - Administer IV fluids if ordered to ensure adequate hydration  - Administer ordered medications as needed  - Encourage mobilization and activity  - Consider nutritional services referral to assist patient with adequate nutrition and appropriate food choices  Outcome: Progressing  Goal: Maintains adequate nutritional intake  Description: INTERVENTIONS:  - Monitor percentage of each meal consumed  - Identify factors contributing to decreased intake, treat as appropriate  - Assist with meals as needed  - Monitor I&O, weight, and lab values if indicated  - Obtain nutrition services referral as needed  Outcome: Progressing  Goal: Establish and maintain optimal ostomy function  Description: INTERVENTIONS:  - Assess bowel function  - Encourage oral fluids to ensure adequate hydration  - Administer IV fluids if ordered to ensure adequate hydration   - Administer ordered medications as needed  - Encourage mobilization and activity  - Nutrition services referral to assist patient with appropriate food choices  - Assess stoma site  - Consider wound care consult   Outcome: Progressing  Goal: Oral mucous membranes remain intact  Description: INTERVENTIONS  - Assess oral mucosa and hygiene practices  - Implement preventative oral hygiene regimen  - Implement oral medicated treatments as ordered  - Initiate Nutrition services referral as needed  Outcome: Progressing     Problem: GENITOURINARY - ADULT  Goal: Maintains or returns to baseline urinary function  Description: INTERVENTIONS:  - Assess urinary function  - Encourage oral fluids to ensure adequate hydration if ordered  - Administer IV fluids as ordered to ensure adequate hydration  - Administer ordered medications as needed  - Offer frequent toileting  - Follow urinary retention protocol if ordered  Outcome: Progressing  Goal: Absence of urinary retention  Description: INTERVENTIONS:  - Assess patients ability to void and empty bladder  - Monitor I/O  - Bladder scan as needed  - Discuss with physician/AP medications to alleviate retention as needed  - Discuss catheterization for long term situations as appropriate  Outcome: Progressing  Goal: Urinary catheter remains patent  Description: INTERVENTIONS:  - Assess patency of urinary catheter  - If patient has a chronic carmen, consider changing catheter if non-functioning  - Follow guidelines for intermittent irrigation of non-functioning urinary catheter  Outcome: Progressing

## 2022-07-19 ENCOUNTER — HOSPITAL ENCOUNTER (EMERGENCY)
Facility: HOSPITAL | Age: 52
Discharge: NURSING, REHAB OR LONG-TERM CARE | End: 2022-07-22
Attending: EMERGENCY MEDICINE | Admitting: EMERGENCY MEDICINE
Payer: COMMERCIAL

## 2022-07-19 ENCOUNTER — TELEPHONE (OUTPATIENT)
Dept: CASE MANAGEMENT | Facility: HOSPITAL | Age: 52
End: 2022-07-19

## 2022-07-19 VITALS
WEIGHT: 315 LBS | TEMPERATURE: 98.2 F | DIASTOLIC BLOOD PRESSURE: 60 MMHG | HEART RATE: 88 BPM | BODY MASS INDEX: 44.1 KG/M2 | SYSTOLIC BLOOD PRESSURE: 117 MMHG | HEIGHT: 71 IN | RESPIRATION RATE: 16 BRPM | OXYGEN SATURATION: 99 %

## 2022-07-19 DIAGNOSIS — Z87.19 S/P SMALL BOWEL OBSTRUCTION: ICD-10-CM

## 2022-07-19 DIAGNOSIS — E66.2 OBESITY HYPOVENTILATION SYNDROME (HCC): ICD-10-CM

## 2022-07-19 DIAGNOSIS — E66.01 MORBID OBESITY (HCC): ICD-10-CM

## 2022-07-19 DIAGNOSIS — G89.18 POST-OP PAIN: Primary | ICD-10-CM

## 2022-07-19 LAB
ANION GAP SERPL CALCULATED.3IONS-SCNC: -1 MMOL/L (ref 4–13)
BASOPHILS # BLD AUTO: 0.02 THOUSANDS/ΜL (ref 0–0.1)
BASOPHILS NFR BLD AUTO: 1 % (ref 0–1)
BUN SERPL-MCNC: 6 MG/DL (ref 5–25)
CALCIUM SERPL-MCNC: 8.4 MG/DL (ref 8.3–10.1)
CHLORIDE SERPL-SCNC: 104 MMOL/L (ref 96–108)
CO2 SERPL-SCNC: 36 MMOL/L (ref 21–32)
CREAT SERPL-MCNC: 0.52 MG/DL (ref 0.6–1.3)
EOSINOPHIL # BLD AUTO: 0.04 THOUSAND/ΜL (ref 0–0.61)
EOSINOPHIL NFR BLD AUTO: 1 % (ref 0–6)
ERYTHROCYTE [DISTWIDTH] IN BLOOD BY AUTOMATED COUNT: 17.2 % (ref 11.6–15.1)
FLUAV RNA RESP QL NAA+PROBE: NEGATIVE
FLUBV RNA RESP QL NAA+PROBE: NEGATIVE
GFR SERPL CREATININE-BSD FRML MDRD: 123 ML/MIN/1.73SQ M
GLUCOSE SERPL-MCNC: 103 MG/DL (ref 65–140)
GLUCOSE SERPL-MCNC: 103 MG/DL (ref 65–140)
GLUCOSE SERPL-MCNC: 107 MG/DL (ref 65–140)
GLUCOSE SERPL-MCNC: 112 MG/DL (ref 65–140)
HCT VFR BLD AUTO: 26 % (ref 36.5–49.3)
HGB BLD-MCNC: 7.3 G/DL (ref 12–17)
IMM GRANULOCYTES # BLD AUTO: 0.08 THOUSAND/UL (ref 0–0.2)
IMM GRANULOCYTES NFR BLD AUTO: 2 % (ref 0–2)
LYMPHOCYTES # BLD AUTO: 1.66 THOUSANDS/ΜL (ref 0.6–4.47)
LYMPHOCYTES NFR BLD AUTO: 38 % (ref 14–44)
MCH RBC QN AUTO: 28.2 PG (ref 26.8–34.3)
MCHC RBC AUTO-ENTMCNC: 28.1 G/DL (ref 31.4–37.4)
MCV RBC AUTO: 100 FL (ref 82–98)
MONOCYTES # BLD AUTO: 0.39 THOUSAND/ΜL (ref 0.17–1.22)
MONOCYTES NFR BLD AUTO: 9 % (ref 4–12)
NEUTROPHILS # BLD AUTO: 2.22 THOUSANDS/ΜL (ref 1.85–7.62)
NEUTS SEG NFR BLD AUTO: 49 % (ref 43–75)
NRBC BLD AUTO-RTO: 1 /100 WBCS
PLATELET # BLD AUTO: 159 THOUSANDS/UL (ref 149–390)
PMV BLD AUTO: 9.9 FL (ref 8.9–12.7)
POTASSIUM SERPL-SCNC: 4.6 MMOL/L (ref 3.5–5.3)
RBC # BLD AUTO: 2.59 MILLION/UL (ref 3.88–5.62)
RSV RNA RESP QL NAA+PROBE: NEGATIVE
SARS-COV-2 RNA RESP QL NAA+PROBE: NEGATIVE
SODIUM SERPL-SCNC: 139 MMOL/L (ref 135–147)
WBC # BLD AUTO: 4.41 THOUSAND/UL (ref 4.31–10.16)

## 2022-07-19 PROCEDURE — 80048 BASIC METABOLIC PNL TOTAL CA: CPT | Performed by: PHYSICIAN ASSISTANT

## 2022-07-19 PROCEDURE — 82948 REAGENT STRIP/BLOOD GLUCOSE: CPT

## 2022-07-19 PROCEDURE — 0241U HB NFCT DS VIR RESP RNA 4 TRGT: CPT

## 2022-07-19 PROCEDURE — 99024 POSTOP FOLLOW-UP VISIT: CPT | Performed by: NURSE PRACTITIONER

## 2022-07-19 PROCEDURE — 94664 DEMO&/EVAL PT USE INHALER: CPT

## 2022-07-19 PROCEDURE — 85025 COMPLETE CBC W/AUTO DIFF WBC: CPT | Performed by: PHYSICIAN ASSISTANT

## 2022-07-19 PROCEDURE — NC001 PR NO CHARGE: Performed by: SURGERY

## 2022-07-19 PROCEDURE — 94640 AIRWAY INHALATION TREATMENT: CPT

## 2022-07-19 PROCEDURE — 99243 OFF/OP CNSLTJ NEW/EST LOW 30: CPT

## 2022-07-19 PROCEDURE — 99285 EMERGENCY DEPT VISIT HI MDM: CPT

## 2022-07-19 PROCEDURE — 94760 N-INVAS EAR/PLS OXIMETRY 1: CPT

## 2022-07-19 PROCEDURE — 99285 EMERGENCY DEPT VISIT HI MDM: CPT | Performed by: EMERGENCY MEDICINE

## 2022-07-19 RX ORDER — ONDANSETRON 2 MG/ML
4 INJECTION INTRAMUSCULAR; INTRAVENOUS EVERY 4 HOURS PRN
Status: DISCONTINUED | OUTPATIENT
Start: 2022-07-19 | End: 2022-07-22 | Stop reason: HOSPADM

## 2022-07-19 RX ORDER — METHOCARBAMOL 750 MG/1
750 TABLET, FILM COATED ORAL EVERY 6 HOURS SCHEDULED
Qty: 30 TABLET | Refills: 0 | Status: SHIPPED | OUTPATIENT
Start: 2022-07-19 | End: 2022-09-12 | Stop reason: ALTCHOICE

## 2022-07-19 RX ORDER — SODIUM CHLORIDE 1000 MG
2 TABLET, SOLUBLE MISCELLANEOUS 3 TIMES DAILY
Qty: 60 TABLET | Refills: 0 | Status: SHIPPED | OUTPATIENT
Start: 2022-07-19 | End: 2022-10-18

## 2022-07-19 RX ORDER — GABAPENTIN 250 MG/5ML
300 SOLUTION ORAL 3 TIMES DAILY
Status: DISCONTINUED | OUTPATIENT
Start: 2022-07-19 | End: 2022-07-19

## 2022-07-19 RX ORDER — ACETAMINOPHEN 325 MG/1
975 TABLET ORAL EVERY 8 HOURS SCHEDULED
Status: DISCONTINUED | OUTPATIENT
Start: 2022-07-19 | End: 2022-07-22 | Stop reason: HOSPADM

## 2022-07-19 RX ORDER — DIGOXIN 125 MCG
250 TABLET ORAL DAILY
Status: DISCONTINUED | OUTPATIENT
Start: 2022-07-20 | End: 2022-07-22 | Stop reason: HOSPADM

## 2022-07-19 RX ORDER — GABAPENTIN 300 MG/1
300 CAPSULE ORAL 3 TIMES DAILY
Status: DISCONTINUED | OUTPATIENT
Start: 2022-07-19 | End: 2022-07-22 | Stop reason: HOSPADM

## 2022-07-19 RX ORDER — METHOCARBAMOL 500 MG/1
750 TABLET, FILM COATED ORAL EVERY 6 HOURS SCHEDULED
Status: DISCONTINUED | OUTPATIENT
Start: 2022-07-19 | End: 2022-07-22 | Stop reason: HOSPADM

## 2022-07-19 RX ORDER — HYDROMORPHONE HCL/PF 1 MG/ML
0.5 SYRINGE (ML) INJECTION EVERY 4 HOURS PRN
Status: DISCONTINUED | OUTPATIENT
Start: 2022-07-19 | End: 2022-07-19 | Stop reason: HOSPADM

## 2022-07-19 RX ORDER — SPIRONOLACTONE 25 MG/1
50 TABLET ORAL DAILY
Status: DISCONTINUED | OUTPATIENT
Start: 2022-07-20 | End: 2022-07-19

## 2022-07-19 RX ORDER — GABAPENTIN 250 MG/5ML
300 SOLUTION ORAL 3 TIMES DAILY
Qty: 150 ML | Refills: 0 | Status: SHIPPED | OUTPATIENT
Start: 2022-07-19 | End: 2022-09-12 | Stop reason: ALTCHOICE

## 2022-07-19 RX ORDER — QUETIAPINE FUMARATE 50 MG/1
50 TABLET, FILM COATED ORAL
Qty: 30 TABLET | Refills: 0 | Status: SHIPPED | OUTPATIENT
Start: 2022-07-19 | End: 2022-10-18

## 2022-07-19 RX ORDER — SENNOSIDES 8.6 MG
2 TABLET ORAL
Status: DISCONTINUED | OUTPATIENT
Start: 2022-07-19 | End: 2022-07-22 | Stop reason: HOSPADM

## 2022-07-19 RX ORDER — DICYCLOMINE HCL 20 MG
20 TABLET ORAL
Status: DISCONTINUED | OUTPATIENT
Start: 2022-07-19 | End: 2022-07-22 | Stop reason: HOSPADM

## 2022-07-19 RX ORDER — TORSEMIDE 10 MG/1
40 TABLET ORAL 2 TIMES DAILY
Status: DISCONTINUED | OUTPATIENT
Start: 2022-07-19 | End: 2022-07-19

## 2022-07-19 RX ORDER — POLYETHYLENE GLYCOL 3350 17 G/17G
17 POWDER, FOR SOLUTION ORAL DAILY PRN
Status: DISCONTINUED | OUTPATIENT
Start: 2022-07-19 | End: 2022-07-22 | Stop reason: HOSPADM

## 2022-07-19 RX ORDER — OXYCODONE HYDROCHLORIDE 5 MG/1
2.5 TABLET ORAL EVERY 4 HOURS PRN
Status: DISCONTINUED | OUTPATIENT
Start: 2022-07-19 | End: 2022-07-22 | Stop reason: HOSPADM

## 2022-07-19 RX ORDER — DIGOXIN 250 MCG
250 TABLET ORAL DAILY
Qty: 30 TABLET | Refills: 0 | Status: SHIPPED | OUTPATIENT
Start: 2022-07-20 | End: 2022-10-18

## 2022-07-19 RX ORDER — LEVOTHYROXINE SODIUM 0.03 MG/1
25 TABLET ORAL
Status: DISCONTINUED | OUTPATIENT
Start: 2022-07-20 | End: 2022-07-22 | Stop reason: HOSPADM

## 2022-07-19 RX ORDER — HYDROMORPHONE HCL/PF 1 MG/ML
0.5 SYRINGE (ML) INJECTION ONCE
Status: DISCONTINUED | OUTPATIENT
Start: 2022-07-19 | End: 2022-07-19

## 2022-07-19 RX ORDER — SODIUM CHLORIDE 1000 MG
2 TABLET, SOLUBLE MISCELLANEOUS
Status: DISCONTINUED | OUTPATIENT
Start: 2022-07-19 | End: 2022-07-22 | Stop reason: HOSPADM

## 2022-07-19 RX ORDER — AMOXICILLIN 250 MG
1 CAPSULE ORAL 2 TIMES DAILY
Status: DISCONTINUED | OUTPATIENT
Start: 2022-07-19 | End: 2022-07-19

## 2022-07-19 RX ORDER — BUDESONIDE AND FORMOTEROL FUMARATE DIHYDRATE 160; 4.5 UG/1; UG/1
2 AEROSOL RESPIRATORY (INHALATION) 2 TIMES DAILY
Status: DISCONTINUED | OUTPATIENT
Start: 2022-07-19 | End: 2022-07-22 | Stop reason: HOSPADM

## 2022-07-19 RX ORDER — OXYCODONE HCL 5 MG/5 ML
5 SOLUTION, ORAL ORAL EVERY 4 HOURS PRN
Status: DISCONTINUED | OUTPATIENT
Start: 2022-07-19 | End: 2022-07-19

## 2022-07-19 RX ORDER — NYSTATIN 100000 U/G
CREAM TOPICAL 2 TIMES DAILY
Status: DISCONTINUED | OUTPATIENT
Start: 2022-07-19 | End: 2022-07-22 | Stop reason: HOSPADM

## 2022-07-19 RX ORDER — IPRATROPIUM BROMIDE AND ALBUTEROL SULFATE 2.5; .5 MG/3ML; MG/3ML
3 SOLUTION RESPIRATORY (INHALATION) 3 TIMES DAILY
Status: DISCONTINUED | OUTPATIENT
Start: 2022-07-19 | End: 2022-07-22 | Stop reason: HOSPADM

## 2022-07-19 RX ORDER — DOCUSATE SODIUM 100 MG/1
100 CAPSULE, LIQUID FILLED ORAL 2 TIMES DAILY
Status: DISCONTINUED | OUTPATIENT
Start: 2022-07-19 | End: 2022-07-22 | Stop reason: HOSPADM

## 2022-07-19 RX ORDER — FAMOTIDINE 20 MG/1
20 TABLET, FILM COATED ORAL DAILY
Status: DISCONTINUED | OUTPATIENT
Start: 2022-07-20 | End: 2022-07-22 | Stop reason: HOSPADM

## 2022-07-19 RX ORDER — BUPROPION HYDROCHLORIDE 150 MG/1
450 TABLET ORAL DAILY
Status: DISCONTINUED | OUTPATIENT
Start: 2022-07-20 | End: 2022-07-22 | Stop reason: HOSPADM

## 2022-07-19 RX ORDER — OXYCODONE HYDROCHLORIDE 5 MG/1
5 TABLET ORAL EVERY 4 HOURS PRN
Status: DISCONTINUED | OUTPATIENT
Start: 2022-07-19 | End: 2022-07-22 | Stop reason: HOSPADM

## 2022-07-19 RX ORDER — HYDROMORPHONE HCL IN WATER/PF 6 MG/30 ML
0.2 PATIENT CONTROLLED ANALGESIA SYRINGE INTRAVENOUS EVERY 4 HOURS PRN
Status: DISCONTINUED | OUTPATIENT
Start: 2022-07-19 | End: 2022-07-20

## 2022-07-19 RX ORDER — QUETIAPINE FUMARATE 25 MG/1
50 TABLET, FILM COATED ORAL
Status: DISCONTINUED | OUTPATIENT
Start: 2022-07-19 | End: 2022-07-22 | Stop reason: HOSPADM

## 2022-07-19 RX ORDER — LORATADINE 10 MG/1
10 TABLET ORAL DAILY
Status: DISCONTINUED | OUTPATIENT
Start: 2022-07-20 | End: 2022-07-22 | Stop reason: HOSPADM

## 2022-07-19 RX ORDER — ACETAMINOPHEN 325 MG/1
975 TABLET ORAL EVERY 8 HOURS SCHEDULED
Qty: 30 TABLET | Refills: 0 | Status: SHIPPED | OUTPATIENT
Start: 2022-07-19 | End: 2022-10-18

## 2022-07-19 RX ORDER — ACETAMINOPHEN 325 MG/1
975 TABLET ORAL EVERY 8 HOURS PRN
Status: DISCONTINUED | OUTPATIENT
Start: 2022-07-19 | End: 2022-07-19

## 2022-07-19 RX ORDER — LORATADINE 10 MG/1
10 TABLET ORAL ONCE
Status: COMPLETED | OUTPATIENT
Start: 2022-07-19 | End: 2022-07-19

## 2022-07-19 RX ORDER — DABIGATRAN ETEXILATE 150 MG/1
150 CAPSULE ORAL EVERY 12 HOURS SCHEDULED
Status: DISCONTINUED | OUTPATIENT
Start: 2022-07-19 | End: 2022-07-22 | Stop reason: HOSPADM

## 2022-07-19 RX ORDER — HYDROMORPHONE HCL IN WATER/PF 6 MG/30 ML
0.2 PATIENT CONTROLLED ANALGESIA SYRINGE INTRAVENOUS ONCE
Status: COMPLETED | OUTPATIENT
Start: 2022-07-19 | End: 2022-07-19

## 2022-07-19 RX ORDER — OXYCODONE HCL 5 MG/5 ML
5 SOLUTION, ORAL ORAL EVERY 4 HOURS PRN
Qty: 30 ML | Refills: 0 | Status: ON HOLD | OUTPATIENT
Start: 2022-07-19 | End: 2022-07-27

## 2022-07-19 RX ADMIN — LEVOTHYROXINE SODIUM 25 MCG: 25 TABLET ORAL at 05:21

## 2022-07-19 RX ADMIN — QUETIAPINE FUMARATE 50 MG: 25 TABLET ORAL at 23:37

## 2022-07-19 RX ADMIN — LORATADINE 10 MG: 10 TABLET ORAL at 18:31

## 2022-07-19 RX ADMIN — METHOCARBAMOL TABLETS 750 MG: 750 TABLET, COATED ORAL at 00:47

## 2022-07-19 RX ADMIN — METOPROLOL TARTRATE 25 MG: 25 TABLET, FILM COATED ORAL at 00:47

## 2022-07-19 RX ADMIN — HYDROMORPHONE HYDROCHLORIDE 0.2 MG: 0.2 INJECTION, SOLUTION INTRAMUSCULAR; INTRAVENOUS; SUBCUTANEOUS at 09:39

## 2022-07-19 RX ADMIN — OXYCODONE HYDROCHLORIDE 10 MG: 5 SOLUTION ORAL at 09:12

## 2022-07-19 RX ADMIN — GABAPENTIN 300 MG: 250 SOLUTION ORAL at 09:16

## 2022-07-19 RX ADMIN — SENNOSIDES 17.2 MG: 8.6 TABLET, FILM COATED ORAL at 23:36

## 2022-07-19 RX ADMIN — ACETAMINOPHEN 975 MG: 325 TABLET, FILM COATED ORAL at 14:28

## 2022-07-19 RX ADMIN — DIGOXIN 250 MCG: 250 TABLET ORAL at 09:14

## 2022-07-19 RX ADMIN — HYDROMORPHONE HYDROCHLORIDE 0.5 MG: 1 INJECTION, SOLUTION INTRAMUSCULAR; INTRAVENOUS; SUBCUTANEOUS at 05:28

## 2022-07-19 RX ADMIN — DABIGATRAN ETEXILATE MESYLATE 150 MG: 150 CAPSULE ORAL at 09:14

## 2022-07-19 RX ADMIN — PANTOPRAZOLE SODIUM 20 MG: 20 TABLET, DELAYED RELEASE ORAL at 05:21

## 2022-07-19 RX ADMIN — METOPROLOL TARTRATE 25 MG: 25 TABLET, FILM COATED ORAL at 09:50

## 2022-07-19 RX ADMIN — LEVALBUTEROL HYDROCHLORIDE 1.25 MG: 1.25 SOLUTION, CONCENTRATE RESPIRATORY (INHALATION) at 08:00

## 2022-07-19 RX ADMIN — Medication 2 G: at 23:37

## 2022-07-19 RX ADMIN — ACETAMINOPHEN 975 MG: 325 TABLET, FILM COATED ORAL at 05:21

## 2022-07-19 RX ADMIN — DABIGATRAN ETEXILATE MESYLATE 150 MG: 150 CAPSULE ORAL at 23:36

## 2022-07-19 RX ADMIN — IPRATROPIUM BROMIDE AND ALBUTEROL SULFATE 3 ML: 2.5; .5 SOLUTION RESPIRATORY (INHALATION) at 22:23

## 2022-07-19 RX ADMIN — IPRATROPIUM BROMIDE 0.5 MG: 0.5 SOLUTION RESPIRATORY (INHALATION) at 08:00

## 2022-07-19 RX ADMIN — BUDESONIDE AND FORMOTEROL FUMARATE DIHYDRATE 2 PUFF: 160; 4.5 AEROSOL RESPIRATORY (INHALATION) at 09:14

## 2022-07-19 RX ADMIN — METHOCARBAMOL TABLETS 750 MG: 750 TABLET, COATED ORAL at 12:49

## 2022-07-19 RX ADMIN — METOPROLOL TARTRATE 25 MG: 25 TABLET, FILM COATED ORAL at 18:31

## 2022-07-19 RX ADMIN — BUDESONIDE AND FORMOTEROL FUMARATE DIHYDRATE 2 PUFF: 160; 4.5 AEROSOL RESPIRATORY (INHALATION) at 23:35

## 2022-07-19 RX ADMIN — OXYCODONE HYDROCHLORIDE 2.5 MG: 5 TABLET ORAL at 23:41

## 2022-07-19 RX ADMIN — SODIUM CHLORIDE TAB 1 GM 2 G: 1 TAB at 09:14

## 2022-07-19 RX ADMIN — GABAPENTIN 300 MG: 300 CAPSULE ORAL at 23:36

## 2022-07-19 RX ADMIN — METHOCARBAMOL TABLETS 750 MG: 500 TABLET, COATED ORAL at 18:31

## 2022-07-19 RX ADMIN — ACETAMINOPHEN 975 MG: 325 TABLET, FILM COATED ORAL at 23:37

## 2022-07-19 RX ADMIN — DOCUSATE SODIUM 100 MG: 100 CAPSULE, LIQUID FILLED ORAL at 18:31

## 2022-07-19 RX ADMIN — NYSTATIN: 100000 CREAM TOPICAL at 23:36

## 2022-07-19 RX ADMIN — DICYCLOMINE HYDROCHLORIDE 20 MG: 20 TABLET ORAL at 23:37

## 2022-07-19 RX ADMIN — METHOCARBAMOL TABLETS 750 MG: 750 TABLET, COATED ORAL at 05:21

## 2022-07-19 NOTE — CASE MANAGEMENT
Case Management Discharge Planning Note    Patient name Avinash Dietrich  Location [facility] 607/[facility] 731-42 MRN [id number]  : 1970 Date 2022       Current Admission Date: 2022  Current Admission Diagnosis:SBO   Patient Active Problem List    Diagnosis Date Noted    Hyponatremia 2022    Acute respiratory failure ([address] ) 2022    Hypertension 2022    Diabetes mellitus ([facility] ) 2022    Depression 2022    Intractable abdominal pain 2022    Epidermoid cyst of neck 2022    [facility] 2021    Cellulitis of multiple sites of head and neck 2021    Elevated troponin 2021    History of DVT (deep vein thrombosis) 2021    Positive blood culture 2021    QT prolongation 2021    COPD (chronic obstructive pulmonary disease) ([facility] ) 05/10/2021    Hypothyroidism 05/10/2021    Medical non-compliance 2017    Atrial fibrillation with RVR ([facility] ) 2017    Foot pain 11/15/2016    Knee pain 11/15/2016    SBO 2016    Recurrent bronchospasm 10/16/2016    Venous stasis dermatitis of both lower extremities 10/16/2016    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    Obesity hypoventilation syndrome ([facility] ) 2016    Chronic diastolic heart failure (HCC) 2016    MRSA (methicillin resistant Staphylococcus aureus) Tracheobronchitis 2016    Atrial fibrillation with rapid ventricular response ([facility] ) 08/15/2016    Morbid obesity ([facility] ) 08/15/2016    Tremor 08/15/2016      LOS (days): 35  Geometric Mean LOS (GMLOS) (days): 10 30  Days to GMLOS:-24 6     OBJECTIVE:  Risk of Unplanned Readmission Score: 54 85         Current admission status: Inpatient   Preferred Pharmacy:   [facility]/pharmacy #[id number]- [address]  [place]   [address] [address], [address] [facility]  Phone: [phone] Fax: [phone]    Primary Care Provider: Courtney Escobar, MD    Primary Insurance: [address]  Secondary Insurance:     DISCHARGE DETAILS: Late entry from 0900- message sent to [facility] via [address], stating that patient is cleared for DC to facility  Patient aware and in agreement

## 2022-07-19 NOTE — PROGRESS NOTES
Progress Note - Peace Aguilera 47 y o  male MRN: [id number]    Unit/Bed#: [facility] 455-34 Encounter: [id number]      Assessment:  Peace Aguilera is a 47 y o  male who p/w SBO 2/2 chronic incarcerated ventral hernia s/p:STSG, I&D, w/o and repair of serosal tears on 6/14  w/o, abthera, formerly [facility] 6/15 bridging vicryl mesh, wound vac placement 6/17  I&D w/o and vac placement 6/30 STSG 7/5    Plan:  Follow up on Wound Care consult recommendations  Daily dressing changes by surgical team -- leaving LLE donor site with xeroform and open to air  Continue to monitor and measure output of EC fistula   If high output would consider NPO TPN  Nephro consulted, appreciate recs  Pain and nausea control PRN  Hep gtt  Work on Discharge planning with CM    Subjective:   Dressing and packing changed yesterday  Some mild leakage around the wound manager towards the packing site mid-abdomen  Complaining of left leg burning sensation around skin graft donor site  About 75-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  Objective:     Vitals: Blood pressure 117/60, pulse 88, temperature 98 2 °F (36 8 °C), resp  rate 16, height 5' 11" (1 803 m), weight (!) 229 kg (503 lb 15 5 oz), SpO2 99 %  ,Body mass index is 70 29 kg/m²        Intake/Output Summary (Last 24 hours) at 7/19/2022 0835  Last data filed at 7/19/2022 0804  Gross per 24 hour   Intake 589 13 ml   Output 850 ml   Net -260 87 ml       Physical Exam:   General - no acute distress, responsive and cooperative  CV - warm, regular rate  Pulm - normal work of breathing, no respiratory distress  Abd - soft, midline wound with right sided wound manager over EC fistula with succus output, STSG over midline w/majority well taken  Neuro - m/s grossly intact, cn grossly intact  Ext - moving all extremities, LLE donor site with xeroform, healing viable tissue       Invasive Devices  Report    Peripherally Inserted Central Catheter Line  Duration           PICC Line 34/50/83 Right Basilic 35 days          Drain  Duration Open Drain Medial RUQ 4 days                Lab, Imaging and other studies: I have personally reviewed pertinent reports      VTE Pharmacologic Prophylaxis: Heparin  VTE Mechanical Prophylaxis: sequential compression device

## 2022-07-19 NOTE — DISCHARGE SUMMARY
Discharge Summary - Marialuisa Bobo 47 y o  male MRN: [id number]    Unit/Bed#: [facility] 813-31 Encounter: [id number]    Admission Date:   Admission Orders (From admission, onward)     Ordered        06/14/22 1749  Inpatient Admission  Once                        Admitting Diagnosis: Small bowel obstruction ([facility] ) [[address] [id number]]    HPI: per TOM Esparza  Demo:  "Marialuisa Bobo is a 47 y o  male with a PMH of COPD with chronic O2 needs 4L NC, OHS, obesity, H/o DVT, Afib AC with pradaxa, H/o CHF, venous insufficiency, h/o ventral hernia with abd surgical intervention in the distant past, HTN who presents with abd pain x 1 day  Started in the morning when he woke up on 6/3/22, generalized in the abd with worsening intensity despite tylenol  Worsening distention in the abdomen per pt  Last BM was am of 6/3/22  He's been passing flatus during the day 6/3/22  No diarrhea or constipation per patient  There has been constant n/v, vomiting up even sips of liquid despite zofran use      Patient will be admitted for SBO conservative management and gen surg consult "    Procedures Performed:   Orders Placed This Encounter   Procedures    Wound vacum dressing application          Wound vacum dressing application             Summary of Hospital Course: 48 y/o male admitted originally to medicine with a PMH of COPD on 4 liters of O2, A-fib, OHS, obesity, AC with Pradaxa  Woke up with generalized abdominal pain which worsened, N/V and came to hospital   Red surgery consulted for a second look laprotomy and possible closure on 6/14/22  He had been in the hospital for awhile, went back to OR on 6/15/22, Laparotomy Exploratory with repair of serosal injury, also a dressing change of the abdominal vac  On 6/17/22 went to the OR again for an abdominal washout, repair of serosal tears and bridging vicryl mesh, partial closure and application of a wound vac  For complete details of his stay please refer to medical records    As of today he is stable for discharge with a wound manager in place and dressings  Significant Findings, Care, Treatment and Services Provided: XR chest portable ICU    Result Date: 6/19/2022  Impression: 1  Image quality limited but suspicious for pneumonia in the left lung base unchanged since the most recent prior study  Superimposed CHF would also be a consideration  2   Lines and tubes in satisfactory position  Workstation performed: [id number]     XR chest portable ICU    Result Date: 6/17/2022  Impression: Questioned left basilar infiltrate  Suzanne Nissen Heart shadow is enlarged and there is pulmonary vascular congestion concerning for CHF  Workstation performed: [id number]     XR chest portable ICU    Result Date: 6/17/2022  Impression: Pulmonary vascular congestion without a definitive consolidation or effusion  No pneumothorax  Workstation performed: [id number]         Complications:     Discharge Diagnosis:ED Fistula  SBO  Large ventral hernia   Ex lap , abthera at [facility]  Washout with abthera vac  Bridging Vicryl mesh, partial closure, vac placement  Attempted STSG, I & D, Washout and vac placement  STSG    Medical Problems             Resolved Problems  Date Reviewed: 7/12/2022   None                 Condition at Discharge: stable         Discharge instructions/Information to patient and family:   See after visit summary for information provided to patient and family  Provisions for Follow-Up Care:  See after visit summary for information related to follow-up care and any pertinent home health orders  PCP: Aftab Toledo MD    Disposition: rehab    Planned Readmission: No      Discharge Statement   I spent 30 minutes discharging the patient  This time was spent on the day of discharge  I had direct contact with the patient on the day of discharge  Additional documentation is required if more than 30 minutes were spent on discharge       Discharge Medications:  See after visit summary for reconciled discharge medications provided to patient and family

## 2022-07-19 NOTE — NURSING NOTE
Pt slept through the night  In the morning while resident was performing rounds pt complained of pain in his left calf and upper thigh  Dilaudid  Mg IV given for pain  Pt reported improvement in pain

## 2022-07-19 NOTE — PLAN OF CARE
Problem: Prexisting or High Potential for Compromised Skin Integrity  Goal: Skin integrity is maintained or improved  Description: INTERVENTIONS:  - Identify patients at risk for skin breakdown  - Assess and monitor skin integrity  - Assess and monitor nutrition and hydration status  - Monitor labs   - Assess for incontinence   - Turn and reposition patient  - Assist with mobility/ambulation  - Relieve pressure over bony prominences  - Avoid friction and shearing  - Provide appropriate hygiene as needed including keeping skin clean and dry  - Evaluate need for skin moisturizer/barrier cream  - Collaborate with interdisciplinary team   - Patient/family teaching  - Consider wound care consult   7/19/2022 1310 by Nichole Stephenson RN  Outcome: Adequate for Discharge  7/19/2022 1055 by Nichole Stephenson RN  Outcome: Progressing     Problem: Potential for Falls  Goal: Patient will remain free of falls  Description: INTERVENTIONS:  - Educate patient/family on patient safety including physical limitations  - Instruct patient to call for assistance with activity   - Consult OT/PT to assist with strengthening/mobility   - Keep Call bell within reach  - Keep bed low and locked with side rails adjusted as appropriate  - Keep care items and personal belongings within reach  - Initiate and maintain comfort rounds  - Make Fall Risk Sign visible to staff  - Offer Toileting every 1 Hours, in advance of need  - Initiate/Maintain alarm  - Obtain necessary fall risk management equipment  - Apply yellow socks and bracelet for high fall risk patients  - Consider moving patient to room near nurses station  7/19/2022 1310 by Nichole Stephenson RN  Outcome: Adequate for Discharge  7/19/2022 1055 by Nichole Stephenson RN  Outcome: Progressing     Problem: MOBILITY - ADULT  Goal: Maintain or return to baseline ADL function  Description: INTERVENTIONS:  -  Assess patient's ability to carry out ADLs; assess patient's baseline for ADL function and identify physical deficits which impact ability to perform ADLs (bathing, care of mouth/teeth, toileting, grooming, dressing, etc )  - Assess/evaluate cause of self-care deficits   - Assess range of motion  - Assess patient's mobility; develop plan if impaired  - Assess patient's need for assistive devices and provide as appropriate  - Encourage maximum independence but intervene and supervise when necessary  - Involve family in performance of ADLs  - Assess for home care needs following discharge   - Consider OT consult to assist with ADL evaluation and planning for discharge  - Provide patient education as appropriate  7/19/2022 1310 by Ilda German RN  Outcome: Adequate for Discharge  7/19/2022 1055 by Ilda German RN  Outcome: Progressing  Goal: Maintains/Returns to pre admission functional level  Description: INTERVENTIONS:  - Perform BMAT or MOVE assessment daily    - Set and communicate daily mobility goal to care team and patient/family/caregiver  - Collaborate with rehabilitation services on mobility goals if consulted  - Perform Range of Motion 3 times a day  - Reposition patient every 2 hours  - Dangle patient 3 times a day  - Stand patient 3 times a day  - Ambulate patient 3 times a day  - Out of bed to chair 3 times a day   - Out of bed for meals 3 times a day  - Out of bed for toileting  - Record patient progress and toleration of activity level   7/19/2022 1310 by Ilda German RN  Outcome: Adequate for Discharge  7/19/2022 1055 by Ilda German RN  Outcome: Progressing     Problem: Nutrition/Hydration-ADULT  Goal: Nutrient/Hydration intake appropriate for improving, restoring or maintaining nutritional needs  Description: Monitor and assess patient's nutrition/hydration status for malnutrition  Collaborate with interdisciplinary team and initiate plan and interventions as ordered  Monitor patient's weight and dietary intake as ordered or per policy   Utilize nutrition screening tool and intervene as necessary  Determine patient's food preferences and provide high-protein, high-caloric foods as appropriate       INTERVENTIONS:  - Monitor oral intake, urinary output, labs, and treatment plans  - Assess nutrition and hydration status and recommend course of action  - Evaluate amount of meals eaten  - Assist patient with eating if necessary   - Allow adequate time for meals  - Recommend/ encourage appropriate diets, oral nutritional supplements, and vitamin/mineral supplements  - Order, calculate, and assess calorie counts as needed  - Recommend, monitor, and adjust tube feedings and TPN/PPN based on assessed needs  - Assess need for intravenous fluids  - Provide specific nutrition/hydration education as appropriate  - Include patient/family/caregiver in decisions related to nutrition  7/19/2022 1310 by Austyn Palma RN  Outcome: Adequate for Discharge  7/19/2022 1055 by Austyn Palma RN  Outcome: Progressing     Problem: PAIN - ADULT  Goal: Verbalizes/displays adequate comfort level or baseline comfort level  Description: Interventions:  - Encourage patient to monitor pain and request assistance  - Assess pain using appropriate pain scale  - Administer analgesics based on type and severity of pain and evaluate response  - Implement non-pharmacological measures as appropriate and evaluate response  - Consider cultural and social influences on pain and pain management  - Notify physician/advanced practitioner if interventions unsuccessful or patient reports new pain  7/19/2022 1310 by Austyn Palma RN  Outcome: Adequate for Discharge  7/19/2022 1055 by Austyn Palma RN  Outcome: Progressing     Problem: INFECTION - ADULT  Goal: Absence or prevention of progression during hospitalization  Description: INTERVENTIONS:  - Assess and monitor for signs and symptoms of infection  - Monitor lab/diagnostic results  - Monitor all insertion sites, i e  indwelling lines, tubes, and drains  - Monitor endotracheal if appropriate and nasal secretions for changes in amount and color  - [place] appropriate cooling/warming therapies per order  - Administer medications as ordered  - Instruct and encourage patient and family to use good hand hygiene technique  - Identify and instruct in appropriate isolation precautions for identified infection/condition  7/19/2022 1310 by Law Mahmood RN  Outcome: Adequate for Discharge  7/19/2022 1055 by Law Mahmood RN  Outcome: Progressing     Problem: DISCHARGE PLANNING  Goal: Discharge to home or other facility with appropriate resources  Description: INTERVENTIONS:  - Identify barriers to discharge w/patient and caregiver  - Arrange for needed discharge resources and transportation as appropriate  - Identify discharge learning needs (meds, wound care, etc )  - Arrange for interpretive services to assist at discharge as needed  - Refer to Case Management Department for coordinating discharge planning if the patient needs post-hospital services based on physician/advanced practitioner order or complex needs related to functional status, cognitive ability, or social support system  7/19/2022 1310 by Law Mahmood RN  Outcome: Adequate for Discharge  7/19/2022 1055 by Law Mahmood RN  Outcome: Progressing     Problem: Knowledge Deficit  Goal: Patient/family/caregiver demonstrates understanding of disease process, treatment plan, medications, and discharge instructions  Description: Complete learning assessment and assess knowledge base    Interventions:  - Provide teaching at level of understanding  - Provide teaching via preferred learning methods  7/19/2022 1310 by Law Mahmood RN  Outcome: Adequate for Discharge  7/19/2022 1055 by Law Mahmood RN  Outcome: Progressing     Problem: CARDIOVASCULAR - ADULT  Goal: Maintains optimal cardiac output and hemodynamic stability  Description: INTERVENTIONS:  - Monitor I/O, vital signs and rhythm  - Monitor for S/S and trends of decreased cardiac output  - Administer and titrate ordered vasoactive medications to optimize hemodynamic stability  - Assess quality of pulses, skin color and temperature  - Assess for signs of decreased coronary artery perfusion  - Instruct patient to report change in severity of symptoms  7/19/2022 1310 by Olvin Gutierrez RN  Outcome: Adequate for Discharge  7/19/2022 1055 by Olvin Gutierrez RN  Outcome: Progressing  Goal: Absence of cardiac dysrhythmias or at baseline rhythm  Description: INTERVENTIONS:  - Continuous cardiac monitoring, vital signs, obtain 12 lead EKG if ordered  - Administer antiarrhythmic and heart rate control medications as ordered  - Monitor electrolytes and administer replacement therapy as ordered  7/19/2022 1310 by Olvin Gutierrez RN  Outcome: Adequate for Discharge  7/19/2022 1055 by Olvin Gutierrez RN  Outcome: Progressing     Problem: RESPIRATORY - ADULT  Goal: Achieves optimal ventilation and oxygenation  Description: INTERVENTIONS:  - Assess for changes in respiratory status  - Assess for changes in mentation and behavior  - Position to facilitate oxygenation and minimize respiratory effort  - Oxygen administered by appropriate delivery if ordered  - Initiate smoking cessation education as indicated  - Encourage broncho-pulmonary hygiene including cough, deep breathe, Incentive Spirometry  - Assess the need for suctioning and aspirate as needed  - Assess and instruct to report SOB or any respiratory difficulty  - Respiratory Therapy support as indicated  7/19/2022 1310 by Olvin Gutierrez RN  Outcome: Adequate for Discharge  7/19/2022 1055 by Olvin Gutierrez RN  Outcome: Progressing     Problem: GASTROINTESTINAL - ADULT  Goal: Minimal or absence of nausea and/or vomiting  Description: INTERVENTIONS:  - Administer IV fluids if ordered to ensure adequate hydration  - Maintain NPO status until nausea and vomiting are resolved  - Nasogastric tube if ordered  - Administer ordered antiemetic medications as needed  - Provide nonpharmacologic comfort measures as appropriate  - Advance diet as tolerated, if ordered  - Consider nutrition services referral to assist patient with adequate nutrition and appropriate food choices  7/19/2022 1310 by Re Adler RN  Outcome: Adequate for Discharge  7/19/2022 1055 by Re Adler RN  Outcome: Progressing  Goal: Maintains or returns to baseline bowel function  Description: INTERVENTIONS:  - Assess bowel function  - Encourage oral fluids to ensure adequate hydration  - Administer IV fluids if ordered to ensure adequate hydration  - Administer ordered medications as needed  - Encourage mobilization and activity  - Consider nutritional services referral to assist patient with adequate nutrition and appropriate food choices  7/19/2022 1310 by Re Adler RN  Outcome: Adequate for Discharge  7/19/2022 1055 by Re Adler RN  Outcome: Progressing  Goal: Maintains adequate nutritional intake  Description: INTERVENTIONS:  - Monitor percentage of each meal consumed  - Identify factors contributing to decreased intake, treat as appropriate  - Assist with meals as needed  - Monitor I&O, weight, and lab values if indicated  - Obtain nutrition services referral as needed  7/19/2022 1310 by Re Adler RN  Outcome: Adequate for Discharge  7/19/2022 1055 by Re Adler RN  Outcome: Progressing  Goal: Establish and maintain optimal ostomy function  Description: INTERVENTIONS:  - Assess bowel function  - Encourage oral fluids to ensure adequate hydration  - Administer IV fluids if ordered to ensure adequate hydration   - Administer ordered medications as needed  - Encourage mobilization and activity  - Nutrition services referral to assist patient with appropriate food choices  - Assess stoma site  - Consider wound care consult   7/19/2022 1310 by Re Adler RN  Outcome: Adequate for Discharge  7/19/2022 1055 by Oswaldo Aguirre Eyal Cruz RN  Outcome: Progressing  Goal: Oral mucous membranes remain intact  Description: INTERVENTIONS  - Assess oral mucosa and hygiene practices  - Implement preventative oral hygiene regimen  - Implement oral medicated treatments as ordered  - Initiate Nutrition services referral as needed  7/19/2022 1310 by Maricel Alford RN  Outcome: Adequate for Discharge  7/19/2022 1055 by Maricel Alford RN  Outcome: Progressing     Problem: GENITOURINARY - ADULT  Goal: Maintains or returns to baseline urinary function  Description: INTERVENTIONS:  - Assess urinary function  - Encourage oral fluids to ensure adequate hydration if ordered  - Administer IV fluids as ordered to ensure adequate hydration  - Administer ordered medications as needed  - Offer frequent toileting  - Follow urinary retention protocol if ordered  7/19/2022 1310 by Maricel Alford RN  Outcome: Adequate for Discharge  7/19/2022 1055 by Maricel Alford RN  Outcome: Progressing  Goal: Absence of urinary retention  Description: INTERVENTIONS:  - Assess patients ability to void and empty bladder  - Monitor I/O  - Bladder scan as needed  - Discuss with physician/AP medications to alleviate retention as needed  - Discuss catheterization for long term situations as appropriate  7/19/2022 1310 by Maricel Alford RN  Outcome: Adequate for Discharge  7/19/2022 1055 by Maricel Alford RN  Outcome: Progressing  Goal: Urinary catheter remains patent  Description: INTERVENTIONS:  - Assess patency of urinary catheter  - If patient has a chronic carmen, consider changing catheter if non-functioning  - Follow guidelines for intermittent irrigation of non-functioning urinary catheter  7/19/2022 1310 by Maricel Alford RN  Outcome: Adequate for Discharge  7/19/2022 1055 by Maricel Alford RN  Outcome: Progressing     Problem: SKIN/TISSUE INTEGRITY - ADULT  Goal: Skin Integrity remains intact(Skin Breakdown Prevention)  Description: Assess:  -Perform Arnoldo assessment every shift  -Clean and moisturize skin every day  -Inspect skin when repositioning, toileting, and assisting with ADLS  -Assess extremities for adequate circulation and sensation     Bed Management:  -Have minimal linens on bed & keep smooth, unwrinkled  -Change linens as needed when moist or perspiring  -Avoid sitting or lying in one position for more than 2 hours while in bed  -Keep HOB at 30degrees     Toileting:  -Offer bedside commode  -Assess for incontinence every   -Use incontinent care products after each incontinent episode such as foam    Activity:  -Mobilize patient 3 times a day  -Encourage activity and walks on unit  -Encourage or provide ROM exercises   -Turn and reposition patient every 2 Hours  -Use appropriate equipment to lift or move patient in bed  -Instruct/ Assist with weight shifting every 2 when out of bed in chair  -Consider limitation of chair time 1 hour intervals    Skin Care:  -Avoid use of baby powder, tape, friction and shearing, hot water or constrictive clothing  -Relieve pressure over bony prominences using wedges  -Do not massage red bony areas      7/19/2022 1310 by Tapan Mckinney RN  Outcome: Adequate for Discharge  7/19/2022 1055 by Tapan Mckinney RN  Outcome: Progressing  Goal: Incision(s), wounds(s) or drain site(s) healing without S/S of infection  Description: INTERVENTIONS  - Assess and document dressing, incision, wound bed, drain sites and surrounding tissue  - Provide patient and family education  - Perform skin care/dressing changes every day  7/19/2022 1310 by Tapan Mckinney RN  Outcome: Adequate for Discharge  7/19/2022 1055 by Tapan Mckinney RN  Outcome: Progressing  Goal: Pressure injury heals and does not worsen  Description: Interventions:  - Implement low air loss mattress or specialty surface (Criteria met)  - Apply silicone foam dressing  - Instruct/assist with weight shifting every 30 minutes when in chair   - Limit chair time to 1 hour intervals  - Use special pressure reducing interventions such as waffle when in chair   - Apply fecal or urinary incontinence containment device   - Perform passive or active ROM every day  - Turn and reposition patient & offload bony prominences every 2 hours   - Utilize friction reducing device or surface for transfers   - Consider consults to  interdisciplinary teams such as   - Use incontinent care products after each incontinent episode such as   - Consider nutrition services referral as needed  7/19/2022 1310 by Katie Smith RN  Outcome: Adequate for Discharge  7/19/2022 1055 by Katie Smith RN  Outcome: Progressing

## 2022-07-19 NOTE — WOUND OSTOMY CARE
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  Wound Care Saw patient yesterday and dressed posterior neck dehiscence then as well  Wound Care saw patient today for fistula management  Bag appears to have been changed today- bag remains well adhered with no signs of leakage  Pj Fistula manager is dry and intact  Orders listed below and wound care will continue to follow If patient remains inpatient, call or tiger text with questions  Skin care plans:  1-Hydraguard to bilateral sacrum, buttock and heels BID and PRN  2-Elevate heels to offload pressure  3-Ehob cushion in chair when out of bed  4-Moisturize skin daily with skin nourishing cream   5-Turn/reposition q2h or when medically stable for pressure re-distribution on skin     6-Irrigate wound to posterior neck with normal saline, apply silver alginate, cover with Allevyn foam  Change every other day and PRN soilage/displacement  7-Cleanse abdominal wound with NS and pat dry  Place xeroform over wound bed and cover with ABD  Secure with Tape  Change Daily or PRN soilage or displacement          Patient seen with Gogo Paul RN, BSN, Jarred Lopez RN, BSN

## 2022-07-19 NOTE — PLAN OF CARE
Problem: Prexisting or High Potential for Compromised Skin Integrity  Goal: Skin integrity is maintained or improved  Description: INTERVENTIONS:  - Identify patients at risk for skin breakdown  - Assess and monitor skin integrity  - Assess and monitor nutrition and hydration status  - Monitor labs   - Assess for incontinence   - Turn and reposition patient  - Assist with mobility/ambulation  - Relieve pressure over bony prominences  - Avoid friction and shearing  - Provide appropriate hygiene as needed including keeping skin clean and dry  - Evaluate need for skin moisturizer/barrier cream  - Collaborate with interdisciplinary team   - Patient/family teaching  - Consider wound care consult   Outcome: Progressing     Problem: Potential for Falls  Goal: Patient will remain free of falls  Description: INTERVENTIONS:  - Educate patient/family on patient safety including physical limitations  - Instruct patient to call for assistance with activity   - Consult OT/PT to assist with strengthening/mobility   - Keep Call bell within reach  - Keep bed low and locked with side rails adjusted as appropriate  - Keep care items and personal belongings within reach  - Initiate and maintain comfort rounds  - Make Fall Risk Sign visible to staff  - Offer Toileting every 1 Hours, in advance of need  - Initiate/Maintain alarm  - Obtain necessary fall risk management equipment  - Apply yellow socks and bracelet for high fall risk patients  - Consider moving patient to room near nurses station  Outcome: Progressing     Problem: MOBILITY - ADULT  Goal: Maintain or return to baseline ADL function  Description: INTERVENTIONS:  -  Assess patient's ability to carry out ADLs; assess patient's baseline for ADL function and identify physical deficits which impact ability to perform ADLs (bathing, care of mouth/teeth, toileting, grooming, dressing, etc )  - Assess/evaluate cause of self-care deficits   - Assess range of motion  - Assess patient's mobility; develop plan if impaired  - Assess patient's need for assistive devices and provide as appropriate  - Encourage maximum independence but intervene and supervise when necessary  - Involve family in performance of ADLs  - Assess for home care needs following discharge   - Consider OT consult to assist with ADL evaluation and planning for discharge  - Provide patient education as appropriate  Outcome: Progressing  Goal: Maintains/Returns to pre admission functional level  Description: INTERVENTIONS:  - Perform BMAT or MOVE assessment daily    - Set and communicate daily mobility goal to care team and patient/family/caregiver  - Collaborate with rehabilitation services on mobility goals if consulted  - Perform Range of Motion 3 times a day  - Reposition patient every 2 hours  - Dangle patient 3 times a day  - Stand patient 3 times a day  - Ambulate patient 3 times a day  - Out of bed to chair 3 times a day   - Out of bed for meals 3 times a day  - Out of bed for toileting  - Record patient progress and toleration of activity level   Outcome: Progressing     Problem: Nutrition/Hydration-ADULT  Goal: Nutrient/Hydration intake appropriate for improving, restoring or maintaining nutritional needs  Description: Monitor and assess patient's nutrition/hydration status for malnutrition  Collaborate with interdisciplinary team and initiate plan and interventions as ordered  Monitor patient's weight and dietary intake as ordered or per policy  Utilize nutrition screening tool and intervene as necessary  Determine patient's food preferences and provide high-protein, high-caloric foods as appropriate       INTERVENTIONS:  - Monitor oral intake, urinary output, labs, and treatment plans  - Assess nutrition and hydration status and recommend course of action  - Evaluate amount of meals eaten  - Assist patient with eating if necessary   - Allow adequate time for meals  - Recommend/ encourage appropriate diets, oral nutritional supplements, and vitamin/mineral supplements  - Order, calculate, and assess calorie counts as needed  - Recommend, monitor, and adjust tube feedings and TPN/PPN based on assessed needs  - Assess need for intravenous fluids  - Provide specific nutrition/hydration education as appropriate  - Include patient/family/caregiver in decisions related to nutrition  Outcome: Progressing     Problem: PAIN - ADULT  Goal: Verbalizes/displays adequate comfort level or baseline comfort level  Description: Interventions:  - Encourage patient to monitor pain and request assistance  - Assess pain using appropriate pain scale  - Administer analgesics based on type and severity of pain and evaluate response  - Implement non-pharmacological measures as appropriate and evaluate response  - Consider cultural and social influences on pain and pain management  - Notify physician/advanced practitioner if interventions unsuccessful or patient reports new pain  Outcome: Progressing     Problem: INFECTION - ADULT  Goal: Absence or prevention of progression during hospitalization  Description: INTERVENTIONS:  - Assess and monitor for signs and symptoms of infection  - Monitor lab/diagnostic results  - Monitor all insertion sites, i e  indwelling lines, tubes, and drains  - Monitor endotracheal if appropriate and nasal secretions for changes in amount and color  - [place] appropriate cooling/warming therapies per order  - Administer medications as ordered  - Instruct and encourage patient and family to use good hand hygiene technique  - Identify and instruct in appropriate isolation precautions for identified infection/condition  Outcome: Progressing     Problem: DISCHARGE PLANNING  Goal: Discharge to home or other facility with appropriate resources  Description: INTERVENTIONS:  - Identify barriers to discharge w/patient and caregiver  - Arrange for needed discharge resources and transportation as appropriate  - Identify discharge learning needs (meds, wound care, etc )  - Arrange for interpretive services to assist at discharge as needed  - Refer to Case Management Department for coordinating discharge planning if the patient needs post-hospital services based on physician/advanced practitioner order or complex needs related to functional status, cognitive ability, or social support system  Outcome: Progressing     Problem: Knowledge Deficit  Goal: Patient/family/caregiver demonstrates understanding of disease process, treatment plan, medications, and discharge instructions  Description: Complete learning assessment and assess knowledge base    Interventions:  - Provide teaching at level of understanding  - Provide teaching via preferred learning methods  Outcome: Progressing     Problem: CARDIOVASCULAR - ADULT  Goal: Maintains optimal cardiac output and hemodynamic stability  Description: INTERVENTIONS:  - Monitor I/O, vital signs and rhythm  - Monitor for S/S and trends of decreased cardiac output  - Administer and titrate ordered vasoactive medications to optimize hemodynamic stability  - Assess quality of pulses, skin color and temperature  - Assess for signs of decreased coronary artery perfusion  - Instruct patient to report change in severity of symptoms  Outcome: Progressing  Goal: Absence of cardiac dysrhythmias or at baseline rhythm  Description: INTERVENTIONS:  - Continuous cardiac monitoring, vital signs, obtain 12 lead EKG if ordered  - Administer antiarrhythmic and heart rate control medications as ordered  - Monitor electrolytes and administer replacement therapy as ordered  Outcome: Progressing     Problem: RESPIRATORY - ADULT  Goal: Achieves optimal ventilation and oxygenation  Description: INTERVENTIONS:  - Assess for changes in respiratory status  - Assess for changes in mentation and behavior  - Position to facilitate oxygenation and minimize respiratory effort  - Oxygen administered by appropriate delivery if ordered  - Initiate smoking cessation education as indicated  - Encourage broncho-pulmonary hygiene including cough, deep breathe, Incentive Spirometry  - Assess the need for suctioning and aspirate as needed  - Assess and instruct to report SOB or any respiratory difficulty  - Respiratory Therapy support as indicated  Outcome: Progressing     Problem: GASTROINTESTINAL - ADULT  Goal: Minimal or absence of nausea and/or vomiting  Description: INTERVENTIONS:  - Administer IV fluids if ordered to ensure adequate hydration  - Maintain NPO status until nausea and vomiting are resolved  - Nasogastric tube if ordered  - Administer ordered antiemetic medications as needed  - Provide nonpharmacologic comfort measures as appropriate  - Advance diet as tolerated, if ordered  - Consider nutrition services referral to assist patient with adequate nutrition and appropriate food choices  Outcome: Progressing  Goal: Maintains or returns to baseline bowel function  Description: INTERVENTIONS:  - Assess bowel function  - Encourage oral fluids to ensure adequate hydration  - Administer IV fluids if ordered to ensure adequate hydration  - Administer ordered medications as needed  - Encourage mobilization and activity  - Consider nutritional services referral to assist patient with adequate nutrition and appropriate food choices  Outcome: Progressing  Goal: Maintains adequate nutritional intake  Description: INTERVENTIONS:  - Monitor percentage of each meal consumed  - Identify factors contributing to decreased intake, treat as appropriate  - Assist with meals as needed  - Monitor I&O, weight, and lab values if indicated  - Obtain nutrition services referral as needed  Outcome: Progressing  Goal: Establish and maintain optimal ostomy function  Description: INTERVENTIONS:  - Assess bowel function  - Encourage oral fluids to ensure adequate hydration  - Administer IV fluids if ordered to ensure adequate hydration   - Administer ordered medications as needed  - Encourage mobilization and activity  - Nutrition services referral to assist patient with appropriate food choices  - Assess stoma site  - Consider wound care consult   Outcome: Progressing  Goal: Oral mucous membranes remain intact  Description: INTERVENTIONS  - Assess oral mucosa and hygiene practices  - Implement preventative oral hygiene regimen  - Implement oral medicated treatments as ordered  - Initiate Nutrition services referral as needed  Outcome: Progressing     Problem: GENITOURINARY - ADULT  Goal: Maintains or returns to baseline urinary function  Description: INTERVENTIONS:  - Assess urinary function  - Encourage oral fluids to ensure adequate hydration if ordered  - Administer IV fluids as ordered to ensure adequate hydration  - Administer ordered medications as needed  - Offer frequent toileting  - Follow urinary retention protocol if ordered  Outcome: Progressing  Goal: Absence of urinary retention  Description: INTERVENTIONS:  - Assess patients ability to void and empty bladder  - Monitor I/O  - Bladder scan as needed  - Discuss with physician/AP medications to alleviate retention as needed  - Discuss catheterization for long term situations as appropriate  Outcome: Progressing  Goal: Urinary catheter remains patent  Description: INTERVENTIONS:  - Assess patency of urinary catheter  - If patient has a chronic carmen, consider changing catheter if non-functioning  - Follow guidelines for intermittent irrigation of non-functioning urinary catheter  Outcome: Progressing     Problem: SKIN/TISSUE INTEGRITY - ADULT  Goal: Skin Integrity remains intact(Skin Breakdown Prevention)  Description: Assess:  -Perform Arnoldo assessment every shift  -Clean and moisturize skin every day  -Inspect skin when repositioning, toileting, and assisting with ADLS  -Assess extremities for adequate circulation and sensation     Bed Management:  -Have minimal linens on bed & keep smooth, unwrinkled  -Change linens as needed when moist or perspiring  -Avoid sitting or lying in one position for more than 2 hours while in bed  -Keep HOB at [address]:  -Offer bedside commode  -Assess for incontinence every   -Use incontinent care products after each incontinent episode such as foam    Activity:  -Mobilize patient 3 times a day  -Encourage activity and walks on unit  -Encourage or provide ROM exercises   -Turn and reposition patient every 2 Hours  -Use appropriate equipment to lift or move patient in bed  -Instruct/ Assist with weight shifting every 2 when out of bed in chair  -Consider limitation of chair time 1 hour intervals    Skin Care:  -Avoid use of baby powder, tape, friction and shearing, hot water or constrictive clothing  -Relieve pressure over bony prominences using wedges  -Do not massage red bony areas      Outcome: Progressing  Goal: Incision(s), wounds(s) or drain site(s) healing without S/S of infection  Description: INTERVENTIONS  - Assess and document dressing, incision, wound bed, drain sites and surrounding tissue  - Provide patient and family education  - Perform skin care/dressing changes every day  Outcome: Progressing  Goal: Pressure injury heals and does not worsen  Description: Interventions:  - Implement low air loss mattress or specialty surface (Criteria met)  - Apply silicone foam dressing  - Instruct/assist with weight shifting every 30 minutes when in chair   - Limit chair time to 1 hour intervals  - Use special pressure reducing interventions such as waffle when in chair   - Apply fecal or urinary incontinence containment device   - Perform passive or active ROM every day  - Turn and reposition patient & offload bony prominences every 2 hours   - Utilize friction reducing device or surface for transfers   - Consider consults to  interdisciplinary teams such as   - Use incontinent care products after each incontinent episode such as   - Consider nutrition services referral as needed  Outcome: Progressing

## 2022-07-19 NOTE — TELEPHONE ENCOUNTER
Call received @ [phone] regarding patient  Per Angelika Cohen, patient arrived at facility and facility was not made aware  Angelika Dew reports she was informed by CM that patient would not be discharging until the end of the week  Angelikajermaine Cohen reports that auth for return was started yesterday and that [facility] request later in the day  Angelika Cohen reports that nursing report was not called into facility and no AVS/DCI was faxed  Angelika Cohen reports that facility/admissions was not made aware of what wound care and supplies patient required for return to facility so facility was unable to accept patient due to such  CM reviewed chart and contacted [facility] IP CM director made aware of above  Call received from [facility] ED provider regarding patient at [phone]  ED provider made aware of above, CM discussed benefit of transfer to B for continuity of care

## 2022-07-19 NOTE — ED PROVIDER NOTES
History  Chief Complaint   Patient presents with    Abdominal Pain     Per holden and EMS patient was sent from \[facility]\"" to the [address] today at [facility], [address] immediately called EMS to send patient back to hospital  Per EMS patient was not suppose to go there  Patient states that he has ongoing abd pain  Patient appears to be post op with multiple wounds  67-year-old male brought in from the [address] [place] [address] after a miscommunication about acceptance to that facility  Patient is postop from [facility]  Patient was supposed to be transferred to the [facility] for long-term care  However according to EMS on arrival to that facility that facility claim to not be aware of his arrival and refused to accept him  We are the closest [address] [place] [address] facility to the [facility] so they brought the patient here  Patient currently has no complaints  I discussed the case with the surgeons and William who are understanding of the situation but he does not need any acute criteria other than needing wound care and his chronic medications  That facility is completely fall has an extensive waiting and it may be 2-3 days before we were able to get him to that facility  Because he does not need any acute hospital criteria it does not appear to be appropriate to admit him to the medical service at this facility  We are currently working with the internal medicine service here the surgical service and William as well as our care management team to figure out the best situation for this patient        History provided by:  Patient, EMS personnel and medical records   used: No    Medical Problem  Location:  Post op patient needing placement for long term care  Severity:  Severe  Onset quality:  Gradual  Timing:  Constant  Progression:  Unchanged  Chronicity:  Chronic  Associated symptoms: abdominal pain    Associated symptoms: no chest pain, no congestion, no cough, no diarrhea, no fever, no headaches, no rash, no vomiting and no wheezing    Abdominal pain:     Location:  Generalized    Quality: aching      Severity:  Moderate    Onset quality:  Gradual    Timing:  Constant    Progression:  Unchanged    Chronicity:  Chronic      Prior to Admission Medications   Prescriptions Last Dose Informant Patient Reported? Taking? Cholecalciferol (VITAMIN D) 50 MCG (2000 UT) tablet  Outside Facility (Specify) Yes No   Sig: Take 2,000 Units by mouth daily   Potassium Chloride (KCL-20 PO)   Yes No   Sig: Take 20 mg by mouth in the morning   QUEtiapine (SEROquel) 50 mg tablet   No No   Sig: Take 1 tablet (50 mg total) by mouth daily at bedtime   acetaminophen (TYLENOL) 325 mg tablet   No No   Sig: Take 3 tablets (975 mg total) by mouth every 8 (eight) hours   aluminum-magnesium hydroxide-simethicone (MYLANTA) 200-200-20 mg/5 mL suspension   No No   Sig: Take 30 mL by mouth every 6 (six) hours as needed for indigestion or heartburn   ammonium lactate (LAC-HYDRIN) 12 % cream   No No   Sig: Apply topically 2 (two) times a day   buPROPion (FORFIVO XL) 450 MG 24 hr tablet   No No   Sig: Take 1 tablet (450 mg total) by mouth daily   budesonide-formoterol (SYMBICORT) 160-4 5 mcg/act inhaler   Yes No   Sig: Inhale 2 puffs 2 (two) times a day Rinse mouth after use     cyanocobalamin (VITAMIN B-12) 1000 MCG tablet  Outside Facility (Specify) Yes No   Sig: Take 1,000 mcg by mouth daily   dabigatran etexilate (PRADAXA) 150 mg capsu  Outside Facility (Specify) Yes No   Sig: Take 150 mg by mouth 2 (two) times a day   dicyclomine (BENTYL) 20 mg tablet   No No   Sig: Take 1 tablet (20 mg total) by mouth 4 (four) times a day (before meals and at bedtime)   digoxin (LANOXIN) 0 25 mg tablet   No No   Sig: Take 1 tablet (250 mcg total) by mouth daily   docusate sodium (COLACE) 100 mg capsule   No No   Sig: Take 1 capsule (100 mg total) by mouth 2 (two) times a day   famotidine (PEPCID) 20 mg tablet   No No   Sig: Take 1 tablet (20 mg total) by mouth daily   gabapentin (NEURONTIN) 250 mg/5 mL solution   No No   Sig: Take 6 mL (300 mg total) by mouth 3 (three) times a day   ipratropium-albuterol (DUO-NEB) 0 5-2 5 mg/3 mL nebulizer solution  Outside Facility (Specify) Yes No   Sig: Take 3 mL by nebulization 3 (three) times a day   levothyroxine 25 mcg tablet  Outside Facility (Specify) Yes No   Sig: Take 25 mcg by mouth daily   loratadine (CLARITIN) 10 mg tablet   Yes No   Sig: Take 10 mg by mouth daily   methocarbamol (ROBAXIN) 750 mg tablet   No No   Sig: Take 1 tablet (750 mg total) by mouth every 6 (six) hours   metoprolol tartrate (LOPRESSOR) 25 mg tablet   No No   Sig: Take 1 tablet (25 mg total) by mouth every 8 (eight) hours   nystatin (MYCOSTATIN) cream   No No   Sig: Apply topically 2 (two) times a day   Patient not taking: Reported on 2022   omeprazole (PriLOSEC) 20 mg delayed release capsule   Yes No   Sig: Take 40 mg by mouth daily   ondansetron (ZOFRAN) 4 mg tablet   No No   Sig: Take 1 tablet (4 mg total) by mouth every 8 (eight) hours as needed for nausea or vomiting   Patient not taking: Reported on 2022   oxyCODONE (ROXICODONE) 5 mg/5 mL solution   No No   Sig: Take 5 mL (5 mg total) by mouth every 4 (four) hours as needed for moderate pain for up to 10 days Max Daily Amount: 30 mg   polyethylene glycol (MIRALAX) 17 g packet   No No   Sig: Take 17 g by mouth daily   sodium chloride (OCEAN) 0 65 % nasal spray   No No   Si spray into each nostril as needed for congestion   sodium chloride 1 g tablet   No No   Sig: Take 2 tablets (2 g total) by mouth 3 (three) times a day   spironolactone (ALDACTONE) 50 mg tablet  Outside Facility (Specify) Yes No   Sig: Take 50 mg by mouth daily   torsemide 40 MG TABS   No No   Sig: Take 40 mg by mouth 2 (two) times a day      Facility-Administered Medications: None       Past Medical History:   Diagnosis Date    Afib (HCC)     Anemia     Anxiety     Cancer ([facility] )     Cardiac disease     Cellulitis     COPD (chronic obstructive pulmonary disease) (HCC)     Depression     Diabetes mellitus (HCC)     DVT (deep venous thrombosis) (HCC)     GERD (gastroesophageal reflux disease)     HBP (high blood pressure)     Heart failure (HCC)     Hx of blood clots     Hypertension     Hypokalemia     Hypothyroid     Obesity     Psychiatric disorder        Past Surgical History:   Procedure Laterality Date    ABDOMINAL HERNIA REPAIR  05/2019    CHOLECYSTECTOMY      Lap    GASTRECTOMY SLEEVE LAPAROSCOPIC  08/2018    INCISION AND DRAINAGE OF WOUND Bilateral 12/01/2021    Procedure: INCISION AND DRAINAGE (I&D) HEAD/FACE;  Surgeon: Kae Askew MD;  Location:  MAIN OR;  Service: General    IR PICC PLACEMENT DOUBLE LUMEN  06/13/2022    IVC FILTER INSERTION  2018    KNEE SURGERY Right     open wound, injury     LAPAROTOMY N/A 6/14/2022    Procedure: LAPAROTOMY EXPLORATORY, EXTENSIVE LYSIS OF ADHESIONS, APPLICATION OF ABTHERA WOUND VAC;  Surgeon: Zora Hills MD;  Location: [address] [place] [address];  Service: General    LAPAROTOMY N/A 6/15/2022    Procedure: LAPAROTOMY EXPLORATORY WITH REPAIR OF SEROSAL INJURY;  Surgeon: Gilbert Gibson MD;  [facility];  Service: General    LEG SURGERY Right 2009    SPLIT THICKNESS SKIN GRAFT N/A 6/30/2022    Procedure: incision and drainage, wash out vac change;  Surgeon: Jonathan Brennan DO;  [facility];  Service: General    SPLIT THICKNESS SKIN GRAFT N/A 7/5/2022    Procedure: PREPERATION RECIPIENT STSG SITE, DEBRIDEMENT NONVIABLE SKIN EDGE; PREPERATION RECIPIENT SKIN GRAFT SITE; APPLICATION OF SKIN SUBSTITUTE TO DONOR SITE; APPLICATION WOUND VAC X2 GREATER THAN 50CM;   Surgeon: Shamar Hou DO;  [facility];  Service: General    US GUIDED VASCULAR ACCESS  08/06/2018    VAC DRESSING APPLICATION N/A 9/82/7332    Procedure: CHANGE DRESSING/VAC ABDOMEN;  Surgeon: Fatuma Sarmiento Raymond Reyes MD;  [facility];  Service: General    VAC DRESSING APPLICATION N/A 5/21/2156    Procedure: ABDOMINAL WASHOUT, REPAIR OF SEROSAL TEARS,BRIDING VICRYL MESH, PARTIAL CLOSURE, APPLICATION OF WOUND VAC;  Surgeon: Zach Davidson DO;  [facility];  Service: General    VAC DRESSING APPLICATION N/A 1/43/1310    Procedure: CHANGE DRESSING/VAC ABDOMEN;  Surgeon: Zach Davidson DO;  [facility];  Service: General       Family History   Problem Relation Age of Onset    Lung cancer Father     Diabetes Maternal Aunt     Heart attack Mother     Clotting disorder Mother     Hypertension Mother     Heart failure Mother     Diabetes Mother     Atrial fibrillation Mother     Sleep apnea Mother     Obesity Mother     Asthma Sister     Stroke Neg Hx     Anuerysm Neg Hx     Arrhythmia Neg Hx     Hyperlipidemia Neg Hx         pt unsure    Fainting Neg Hx      I have reviewed and agree with the history as documented  E-Cigarette/Vaping    E-Cigarette Use Never User      E-Cigarette/Vaping Substances    Nicotine No     THC No     CBD No     Flavoring No     Other No     Unknown No      Social History     Tobacco Use    Smoking status: Former Smoker     Packs/day: 1 00     Years: 15 00     Pack years: 15 00    Smokeless tobacco: Never Used    Tobacco comment: 1 5ppd x 23 years, quit 2014   Vaping Use    Vaping Use: Never used   Substance Use Topics    Alcohol use: Not Currently    Drug use: No       Review of Systems   Constitutional: Negative for activity change, appetite change and fever  HENT: Negative for congestion and facial swelling  Eyes: Negative for discharge and redness  Respiratory: Negative for cough and wheezing  Cardiovascular: Negative for chest pain and leg swelling  Gastrointestinal: Positive for abdominal pain  Negative for abdominal distention, blood in stool, diarrhea and vomiting     Endocrine: Negative for cold intolerance and polydipsia  Genitourinary: Negative for difficulty urinating and hematuria  Musculoskeletal: Negative for arthralgias and gait problem  Skin: Negative for color change and rash  Allergic/Immunologic: Negative for food allergies and immunocompromised state  Neurological: Negative for dizziness, seizures and headaches  Hematological: Negative for adenopathy  Does not bruise/bleed easily  Psychiatric/Behavioral: Negative for agitation, confusion and decreased concentration  All other systems reviewed and are negative  Physical Exam  Physical Exam  Constitutional:       Appearance: He is obese  He is ill-appearing (Chronic)  HENT:      Head: Normocephalic and atraumatic  Cardiovascular:      Rate and Rhythm: Normal rate and regular rhythm  Heart sounds: Normal heart sounds  Pulmonary:      Effort: Pulmonary effort is normal       Breath sounds: Normal breath sounds  Abdominal:      General: A surgical scar is present  Bowel sounds are normal       Tenderness: There is generalized abdominal tenderness  Musculoskeletal:         General: Normal range of motion  Skin:     Coloration: Skin is pale  Neurological:      General: No focal deficit present  Mental Status: He is oriented to person, place, and time     Psychiatric:         Mood and Affect: Mood normal          Behavior: Behavior normal          Vital Signs  ED Triage Vitals   Temperature Pulse Respirations Blood Pressure SpO2   07/19/22 1636 07/19/22 1636 07/19/22 1636 07/19/22 1636 07/19/22 1636   98 6 °F (37 °C) 68 18 95/74 98 %      Temp Source Heart Rate Source Patient Position - Orthostatic VS BP Location FiO2 (%)   07/19/22 1636 07/19/22 2052 07/19/22 2052 07/19/22 2052 --   Oral Monitor Lying Left arm       Pain Score       07/19/22 1636       No Pain           Vitals:    07/19/22 1636 07/19/22 1831 07/19/22 2052   BP: 95/74 122/82 (!) 100/41   Pulse: 68 87 75   Patient Position - Orthostatic VS:   Lying         Visual Acuity      ED Medications  Medications   methocarbamol (ROBAXIN) tablet 750 mg (750 mg Oral Given 7/19/22 1831)   metoprolol tartrate (LOPRESSOR) tablet 25 mg (25 mg Oral Given 7/19/22 1831)   sodium chloride tablet 2 g (has no administration in time range)   QUEtiapine (SEROquel) tablet 50 mg (has no administration in time range)   digoxin (LANOXIN) tablet 250 mcg (has no administration in time range)   budesonide-formoterol (SYMBICORT) 160-4 5 mcg/act inhaler 2 puff (has no administration in time range)   buPROPion (WELLBUTRIN XL) 24 hr tablet 450 mg (has no administration in time range)   dabigatran etexilate (PRADAXA) capsule 150 mg (has no administration in time range)   docusate sodium (COLACE) capsule 100 mg (100 mg Oral Given 7/19/22 1831)   famotidine (PEPCID) tablet 20 mg (has no administration in time range)   ipratropium-albuterol (DUO-NEB) 0 5-2 5 mg/3 mL inhalation solution 3 mL (has no administration in time range)   levothyroxine tablet 25 mcg (has no administration in time range)   gabapentin (NEURONTIN) capsule 300 mg (has no administration in time range)   oxyCODONE (ROXICODONE) IR tablet 2 5 mg (has no administration in time range)   oxyCODONE (ROXICODONE) IR tablet 5 mg (has no administration in time range)   HYDROmorphone HCl (DILAUDID) injection 0 2 mg (has no administration in time range)   naloxone (NARCAN) 0 04 mg/mL syringe 0 04 mg (has no administration in time range)   ondansetron (ZOFRAN) injection 4 mg (has no administration in time range)   polyethylene glycol (MIRALAX) packet 17 g (has no administration in time range)   acetaminophen (TYLENOL) tablet 975 mg (has no administration in time range)   dicyclomine (BENTYL) tablet 20 mg (has no administration in time range)   loratadine (CLARITIN) tablet 10 mg (has no administration in time range)   nystatin (MYCOSTATIN) cream (has no administration in time range)   senna (SENOKOT) tablet 17 2 mg (has no administration in time range)   spironolactone (ALDACTONE) tablet 50 mg (has no administration in time range)   torsemide (DEMADEX) tablet 40 mg (has no administration in time range)   loratadine (CLARITIN) tablet 10 mg (10 mg Oral Given 7/19/22 1831)       Diagnostic Studies  Results Reviewed     None                 No orders to display              Procedures  Procedures         ED Course                               SBIRT 20yo+    Flowsheet Row Most Recent Value   SBIRT (23 yo +)    In order to provide better care to our patients, we are screening all of our patients for alcohol and drug use  Would it be okay to ask you these screening questions?  No Filed at: 07/19/2022 170                    MDM  Number of Diagnoses or Management Options  Morbid obesity ([facility] [address] ): established and worsening  Obesity hypoventilation syndrome ([facility] ): established and worsening  Post-op pain: established and worsening     Amount and/or Complexity of Data Reviewed  Clinical lab tests: reviewed  Tests in the radiology section of CPT®: reviewed  Tests in the medicine section of CPT®: reviewed  Review and summarize past medical records: yes  Discuss the patient with other providers: yes    Patient Progress  Patient progress: stable      Disposition  Final diagnoses:   Post-op pain   Morbid obesity ([facility] [address] )   Obesity hypoventilation syndrome ([facility] [address] )     Time reflects when diagnosis was documented in both MDM as applicable and the Disposition within this note     Time User Action Codes Description Comment    7/19/2022  6:54 PM Nargis VELÁZQUEZ Add [G89 18] Post-op pain     7/19/2022  7:25 PM Turock, Osborne Lefort K Add [E66 01] Morbid obesity ([facility] [address] )     7/19/2022  7:25 PM Nicola Ansari Add [E66 2] Obesity hypoventilation syndrome [facility])       ED Disposition     ED Disposition   Transfer to Another Facility-In Network    Condition   --    Date/Time   Tue Jul 19, 2022  6:54 PM    Jonathan Hendricks Cons should be transferred out to [facility] depending on bed           Follow-up Information    None         Patient's Medications   Discharge Prescriptions    No medications on file       No discharge procedures on file      PDMP Review       Value Time User    PDMP Reviewed  Yes 3/14/2022  8:42 AM Magalys Richards PA-C          ED Provider  Electronically Signed by           Elesa Paget, DO  07/19/22 8532

## 2022-07-20 ENCOUNTER — APPOINTMENT (EMERGENCY)
Dept: RADIOLOGY | Facility: HOSPITAL | Age: 52
End: 2022-07-20
Payer: COMMERCIAL

## 2022-07-20 LAB
ANION GAP SERPL CALCULATED.3IONS-SCNC: 4 MMOL/L (ref 4–13)
BUN SERPL-MCNC: 6 MG/DL (ref 5–25)
CALCIUM SERPL-MCNC: 8.5 MG/DL (ref 8.4–10.2)
CHLORIDE SERPL-SCNC: 97 MMOL/L (ref 96–108)
CO2 SERPL-SCNC: 35 MMOL/L (ref 21–32)
CREAT SERPL-MCNC: 0.5 MG/DL (ref 0.6–1.3)
ERYTHROCYTE [DISTWIDTH] IN BLOOD BY AUTOMATED COUNT: 17.4 % (ref 11.6–15.1)
GFR SERPL CREATININE-BSD FRML MDRD: 125 ML/MIN/1.73SQ M
GLUCOSE SERPL-MCNC: 93 MG/DL (ref 65–140)
HCT VFR BLD AUTO: 25 % (ref 36.5–49.3)
HGB BLD-MCNC: 7.5 G/DL (ref 12–17)
MCH RBC QN AUTO: 28.5 PG (ref 26.8–34.3)
MCHC RBC AUTO-ENTMCNC: 30 G/DL (ref 31.4–37.4)
MCV RBC AUTO: 95 FL (ref 82–98)
PLATELET # BLD AUTO: 147 THOUSANDS/UL (ref 149–390)
PMV BLD AUTO: 9.6 FL (ref 8.9–12.7)
POTASSIUM SERPL-SCNC: 4.2 MMOL/L (ref 3.5–5.3)
RBC # BLD AUTO: 2.63 MILLION/UL (ref 3.88–5.62)
SODIUM SERPL-SCNC: 136 MMOL/L (ref 135–147)
WBC # BLD AUTO: 3.87 THOUSAND/UL (ref 4.31–10.16)

## 2022-07-20 PROCEDURE — 99225 PR SBSQ OBSERVATION CARE/DAY 25 MINUTES: CPT | Performed by: INTERNAL MEDICINE

## 2022-07-20 PROCEDURE — 97167 OT EVAL HIGH COMPLEX 60 MIN: CPT

## 2022-07-20 PROCEDURE — 80048 BASIC METABOLIC PNL TOTAL CA: CPT

## 2022-07-20 PROCEDURE — 94660 CPAP INITIATION&MGMT: CPT

## 2022-07-20 PROCEDURE — 94640 AIRWAY INHALATION TREATMENT: CPT

## 2022-07-20 PROCEDURE — 71045 X-RAY EXAM CHEST 1 VIEW: CPT

## 2022-07-20 PROCEDURE — 97163 PT EVAL HIGH COMPLEX 45 MIN: CPT

## 2022-07-20 PROCEDURE — 36415 COLL VENOUS BLD VENIPUNCTURE: CPT

## 2022-07-20 PROCEDURE — 99024 POSTOP FOLLOW-UP VISIT: CPT | Performed by: PHYSICIAN ASSISTANT

## 2022-07-20 PROCEDURE — 85027 COMPLETE CBC AUTOMATED: CPT

## 2022-07-20 PROCEDURE — 94760 N-INVAS EAR/PLS OXIMETRY 1: CPT

## 2022-07-20 PROCEDURE — 96374 THER/PROPH/DIAG INJ IV PUSH: CPT

## 2022-07-20 RX ORDER — WATER 1000 ML/1000ML
INJECTION, SOLUTION INTRAVENOUS
Status: COMPLETED
Start: 2022-07-20 | End: 2022-07-20

## 2022-07-20 RX ORDER — SODIUM CHLORIDE 9 MG/ML
100 INJECTION, SOLUTION INTRAVENOUS CONTINUOUS
Status: DISCONTINUED | OUTPATIENT
Start: 2022-07-20 | End: 2022-07-21

## 2022-07-20 RX ADMIN — LEVOTHYROXINE SODIUM 25 MCG: 25 TABLET ORAL at 08:57

## 2022-07-20 RX ADMIN — Medication 2 G: at 08:54

## 2022-07-20 RX ADMIN — DABIGATRAN ETEXILATE MESYLATE 150 MG: 150 CAPSULE ORAL at 21:07

## 2022-07-20 RX ADMIN — OXYCODONE HYDROCHLORIDE 5 MG: 5 TABLET ORAL at 08:57

## 2022-07-20 RX ADMIN — METHOCARBAMOL TABLETS 750 MG: 500 TABLET, COATED ORAL at 14:44

## 2022-07-20 RX ADMIN — DIGOXIN 250 MCG: 125 TABLET ORAL at 08:54

## 2022-07-20 RX ADMIN — OXYCODONE HYDROCHLORIDE 2.5 MG: 5 TABLET ORAL at 21:14

## 2022-07-20 RX ADMIN — METOPROLOL TARTRATE 25 MG: 25 TABLET, FILM COATED ORAL at 08:57

## 2022-07-20 RX ADMIN — IPRATROPIUM BROMIDE AND ALBUTEROL SULFATE 3 ML: 2.5; .5 SOLUTION RESPIRATORY (INHALATION) at 08:20

## 2022-07-20 RX ADMIN — Medication 2 G: at 14:45

## 2022-07-20 RX ADMIN — OXYCODONE HYDROCHLORIDE 5 MG: 5 TABLET ORAL at 17:12

## 2022-07-20 RX ADMIN — GABAPENTIN 300 MG: 300 CAPSULE ORAL at 08:56

## 2022-07-20 RX ADMIN — IPRATROPIUM BROMIDE AND ALBUTEROL SULFATE 3 ML: 2.5; .5 SOLUTION RESPIRATORY (INHALATION) at 16:58

## 2022-07-20 RX ADMIN — METHOCARBAMOL TABLETS 750 MG: 500 TABLET, COATED ORAL at 08:52

## 2022-07-20 RX ADMIN — DICYCLOMINE HYDROCHLORIDE 20 MG: 20 TABLET ORAL at 08:58

## 2022-07-20 RX ADMIN — QUETIAPINE FUMARATE 50 MG: 25 TABLET ORAL at 21:05

## 2022-07-20 RX ADMIN — NYSTATIN 1 APPLICATION: 100000 CREAM TOPICAL at 19:36

## 2022-07-20 RX ADMIN — NYSTATIN 1 APPLICATION: 100000 CREAM TOPICAL at 09:33

## 2022-07-20 RX ADMIN — FAMOTIDINE 20 MG: 20 TABLET ORAL at 08:57

## 2022-07-20 RX ADMIN — GABAPENTIN 300 MG: 300 CAPSULE ORAL at 17:13

## 2022-07-20 RX ADMIN — DABIGATRAN ETEXILATE MESYLATE 150 MG: 150 CAPSULE ORAL at 08:59

## 2022-07-20 RX ADMIN — DICYCLOMINE HYDROCHLORIDE 20 MG: 20 TABLET ORAL at 12:55

## 2022-07-20 RX ADMIN — ALTEPLASE 2 MG: 2.2 INJECTION, POWDER, LYOPHILIZED, FOR SOLUTION INTRAVENOUS at 23:32

## 2022-07-20 RX ADMIN — ACETAMINOPHEN 975 MG: 325 TABLET, FILM COATED ORAL at 17:12

## 2022-07-20 RX ADMIN — DICYCLOMINE HYDROCHLORIDE 20 MG: 20 TABLET ORAL at 21:05

## 2022-07-20 RX ADMIN — GABAPENTIN 300 MG: 300 CAPSULE ORAL at 21:05

## 2022-07-20 RX ADMIN — BUDESONIDE AND FORMOTEROL FUMARATE DIHYDRATE 2 PUFF: 160; 4.5 AEROSOL RESPIRATORY (INHALATION) at 17:06

## 2022-07-20 RX ADMIN — BUDESONIDE AND FORMOTEROL FUMARATE DIHYDRATE 2 PUFF: 160; 4.5 AEROSOL RESPIRATORY (INHALATION) at 08:32

## 2022-07-20 RX ADMIN — BUPROPION 450 MG: 150 TABLET, EXTENDED RELEASE ORAL at 08:54

## 2022-07-20 RX ADMIN — METHOCARBAMOL TABLETS 750 MG: 500 TABLET, COATED ORAL at 21:09

## 2022-07-20 RX ADMIN — SENNOSIDES 17.2 MG: 8.6 TABLET, FILM COATED ORAL at 21:05

## 2022-07-20 RX ADMIN — HYDROMORPHONE HYDROCHLORIDE 0.2 MG: 0.2 INJECTION, SOLUTION INTRAMUSCULAR; INTRAVENOUS; SUBCUTANEOUS at 10:32

## 2022-07-20 RX ADMIN — DICYCLOMINE HYDROCHLORIDE 20 MG: 20 TABLET ORAL at 17:12

## 2022-07-20 RX ADMIN — Medication 2 G: at 17:16

## 2022-07-20 RX ADMIN — ACETAMINOPHEN 975 MG: 325 TABLET, FILM COATED ORAL at 08:56

## 2022-07-20 RX ADMIN — DOCUSATE SODIUM 100 MG: 100 CAPSULE, LIQUID FILLED ORAL at 08:58

## 2022-07-20 RX ADMIN — LORATADINE 10 MG: 10 TABLET ORAL at 08:56

## 2022-07-20 RX ADMIN — WATER: 1 INJECTION INTRAMUSCULAR; INTRAVENOUS; SUBCUTANEOUS at 23:34

## 2022-07-20 NOTE — ED NOTES
Dr Zuleyka Da Silva at bedside removing sutures from pts upper back       [address], RN  07/20/22 9808

## 2022-07-20 NOTE — ED NOTES
Assumed care at this time  Pt tolerating meals and oral intake well  No increased output from abdominal fistula  Pt placed on bariatric bed        Dany Savage, FREDIS  07/20/22 7259

## 2022-07-20 NOTE — UTILIZATION REVIEW
Notification of Discharge   This is a Notification of Discharge from our facility [address]  Please be advised that this patient has been discharge from our facility  Below you will find the admission and discharge date and time including the patients disposition  UTILIZATION REVIEW CONTACT:  Pinky Mirza  Utilization   Network Utilization Review Department  Phone: [phone] x carefully listen to the prompts  All voicemails are confidential   Email: [email or web address]     PHYSICIAN ADVISORY SERVICES:  FOR OAPH-EF-OGUI REVIEW - MEDICAL NECESSITY DENIAL  Phone: [phone]  Fax: [phone]  Email: [email or web address]     PRESENTATION DATE: 6/14/2022  5:41 PM  OBERVATION ADMISSION DATE:   INPATIENT ADMISSION DATE: 6/14/22  5:41 PM   DISCHARGE DATE: 7/19/2022  2:45 PM  DISPOSITION: Non [facility]/S[facility] Non [facility] SN[facility]/TC[facility]/SNU      IMPORTANT INFORMATION:  Send all requests for admission clinical reviews, approved or denied determinations and any other requests to dedicated fax number below belonging to the campus where the patient is receiving treatment   List of dedicated fax numbers:  [address] [place] [address] DENIALS (Administrative/Medical Necessity) [phone]   [address]  (Maternity/NICU/Pediatrics) [phone]   Alize Erickson [phone]   [address] [facility] [phone]   [address] [place] [address] [phone]   [address] [place] [address],[address] [phone]   [facility]  [phone]   [address] [facility] [address]  [address] [facility] [address] [facility] [phone]

## 2022-07-20 NOTE — PHYSICAL THERAPY NOTE
PHYSICAL THERAPY EVALUATION  DATE: 07/20/22  TIME: 9312-3604    NAME:  Ruddy Guo  AGE:   47 y o    Mrn:   [id number]  Length Of Stay: 0    ADMIT DX:  Unspecified abdominal pain [R10 9]    Past Medical History:   Diagnosis Date    Afib ([address] )     Anemia     Anxiety     Cancer (HCC)     Cardiac disease     Cellulitis     COPD (chronic obstructive pulmonary disease) (HCC)     Depression     Diabetes mellitus (HCC)     DVT (deep venous thrombosis) (HCC)     GERD (gastroesophageal reflux disease)     HBP (high blood pressure)     Heart failure (HCC)     Hx of blood clots     Hypertension     Hypokalemia     Hypothyroid     Obesity     Psychiatric disorder      Past Surgical History:   Procedure Laterality Date    ABDOMINAL HERNIA REPAIR  05/2019    CHOLECYSTECTOMY      Lap    GASTRECTOMY SLEEVE LAPAROSCOPIC  08/2018    INCISION AND DRAINAGE OF WOUND Bilateral 12/01/2021    Procedure: INCISION AND DRAINAGE (I&D) HEAD/FACE;  Surgeon: Abiel Falcon MD;  Location:  MAIN OR;  Service: General    IR PICC PLACEMENT DOUBLE LUMEN  06/13/2022    IVC FILTER INSERTION  2018    KNEE SURGERY Right     open wound, injury     LAPAROTOMY N/A 6/14/2022    Procedure: LAPAROTOMY EXPLORATORY, EXTENSIVE LYSIS OF ADHESIONS, APPLICATION OF Carloyn Parrot;  Surgeon: Abraham Ashley MD;  Location: [address] [place] [address];  Service: General    LAPAROTOMY N/A 6/15/2022    Procedure: LAPAROTOMY EXPLORATORY WITH REPAIR OF SEROSAL INJURY;  Surgeon: Austyn Coleman MD;  Location:  MAIN OR;  Service: General    LEG SURGERY Right 2009    SPLIT THICKNESS SKIN GRAFT N/A 6/30/2022    Procedure: incision and drainage, wash out vac change;  Surgeon: Smiley Herrera DO;  [facility];  Service: General    SPLIT THICKNESS SKIN GRAFT N/A 7/5/2022    Procedure: PREPERATION RECIPIENT STSG SITE, DEBRIDEMENT NONVIABLE SKIN EDGE; PREPERATION RECIPIENT SKIN GRAFT SITE; APPLICATION OF SKIN SUBSTITUTE TO DONOR SITE; APPLICATION WOUND VAC X2 GREATER THAN 50CM; Surgeon: Zackary Hester DO;  [facility];  Service: General    US GUIDED VASCULAR ACCESS  08/06/2018    VAC DRESSING APPLICATION N/A 4/98/7026    Procedure: CHANGE DRESSING/VAC ABDOMEN;  Surgeon: Sarah Heredia MD;  [facility];  Service: General    VAC DRESSING APPLICATION N/A 8/16/4254    Procedure: ABDOMINAL WASHOUT, REPAIR OF SEROSAL TEARS,BRIDING VICRYL MESH, PARTIAL CLOSURE, APPLICATION OF WOUND VAC;  Surgeon: Kathryn Mercado DO;  [facility];  Service: General    VAC DRESSING APPLICATION N/A 9/71/7551    Procedure: CHANGE DRESSING/VAC ABDOMEN;  Surgeon: Kathryn Mercado DO;  [facility];  Service: General       Performed at least 2 patient identifiers during session: Name, Naeem Fraga, and ID bracelet       07/20/22 1509   PT Last Visit   PT Visit Date 07/20/22   Note Type   Note type Evaluation   Pain Assessment   Pain Assessment Tool 0-10   Pain Score 10 - Worst Possible Pain  ("20/10")   Pain Location/Orientation Location: Abdomen   Pain Onset/Description Onset: Ongoing; Descriptor: Discomfort; Descriptor: Aching   Effect of Pain on Daily Activities limits comfort and positioning, limits tolerance of ADLs and mobility   Patient's Stated Pain Goal No pain   Hospital Pain Intervention(s) Repositioned; Ambulation/increased activity; Emotional support   Multiple Pain Sites No   Restrictions/Precautions   Weight Bearing Precautions Per Order No   Other Precautions Chair Alarm; Bed Alarm;Multiple lines;Telemetry;O2;Fall Risk;Pain   Home Living   Type of Home SNF  [facility], long term resident)   Prior Function   Level of [place] Needs assistance with ADLs and functional mobility; Needs assistance with IADLs   Lives With Facility staff   Receives Help From Personal care attendant   ADL Assistance Needs assistance   IADLs Needs assistance   Falls in the last 6 months 0   Vocational On disability   Comments Pt reports being a long term resident of [facility]  Pt reports that prior to his recent hospitalization, he was needing a Rubens Handing for OOB transfers, but was able to reposition self in hospital bed, achieve EOB and complete sitting activities, assist in washing/bathing  Denies getting therapy services at [facility]  Is motivated to return back to [facility] to participate in therapy services to inc independence  General   Additional Pertinent History Pt presents 7/19/2022 after prolonged hospitalization at [facility] 6/14/22 - 7/18/22 for extensive/mulitple abdominal surgeries, dx S/p small bowel obstruction; was d/c'd from [facility] 7/18/22 to return to [facility] at [facility] however facility was unaware of transition and unable to accept pt, therefore pt was taken to closest hospital, requires insurance authorization for return to facility  Family/Caregiver Present No   Cognition   Overall Cognitive Status Impaired   Arousal/Participation Cooperative   Orientation Level Oriented X4   Memory Decreased recall of recent events;Decreased short term memory   Following Commands Follows multistep commands with increased time or repetition   Subjective   Subjective "They did me dirty over there "   RUE Assessment   RUE Assessment WFL   LUE Assessment   LUE Assessment WFL   RLE Assessment   RLE Assessment X  (<2/5 MMT throughout, limited d/t body habitus and pain)   LLE Assessment   LLE Assessment X  (<2/5 MMT throughout, limited d/t body habitus and pain)   Coordination   Movements are Fluid and Coordinated 1   Sensation WFL   Light Touch   RLE Light Touch Grossly intact   LLE Light Touch Grossly intact   Proprioception   RLE Proprioception Grossly intact   LLE Proprioception Grossly Intact   Bed Mobility   Additional Comments MAXA x2 person needed for repositioning in bed, scooting towards HOB with bed in trendelenburg, pt assist for scooting upwards with B UE grasp on overhead bedrail     Transfers   Sit to Stand Unable to assess   Stand to Sit Unable to assess   Stand pivot Unable to assess   Ambulation/Elevation   Gait Assistance Not tested   Endurance Deficit   Endurance Deficit Yes   Activity Tolerance   Activity Tolerance Patient limited by fatigue;Patient limited by pain   Medical Staff Made Aware Spoke with SLIM, CM, OT   Assessment   Prognosis Guarded   Problem List Decreased strength;Decreased range of motion;Decreased endurance; Impaired balance;Decreased mobility; Decreased cognition;Decreased safety awareness; Obesity; Decreased skin integrity;Pain   Assessment Pt seen for ED PT evaluation  PT consult received for mobility assessment & discharge needs  Pt presents 7/19/2022 after prolonged hospitalization at [facility] 6/14/22 - 7/18/22 for extensive/mulitple abdominal surgeries, dx S/p small bowel obstruction; was d/c'd from [facility] 7/18/22 to return to [facility] however facility was unaware of transition and unable to accept pt, therefore pt was taken to [facility] hospital, requires insurance authorization for return to facility  Comorbidities affecting pt's fnxl performance include: obesity, multiple abdominal surgeries, CHF, Afib, COPD, DVT, HTN, tremor (UE), depression, DM  PTA, pt was requiring assist for functional mobility with bed mobility and frankie lift for OOB transitions, requiring assist for ADLS, requiring assist for IADLS and a resident of long term care  During PT IE, pt requires [address] 2 person for repositioning in bed for comfort and pressure relief  Pt severely limited due to abdominal pain, weakness, deconditioning  Pt demonstrates fnxl impairments identified in objective findings from Elderly Mobility Scale assessment, scoring 0/20, indicating full dependence on others for completion of ADLs and mobility  The AM-PAC objective tool in combination w/ Barthel Index outcome tool were used to assist in determining pt safety w/ mobility/ADLs & appropriate d/c recommendations, see above for scores   Pt is at risk of falls d/t multiple comorbidities, impaired balance, impaired cognition, impaired insight/safety awareness, varying levels of pain , acuity of medical illness and ongoing medical treatment of primary dx  Pt's clinical presentation is currently unstable/unpredictable as seen in pt's presentation of changing level of pain, varying levels of cognitive performance, increased fall risk, new onset of impairment of functional mobility, decreased endurance and new onset of weakness, and requires high complexity clinical decision making  Pt will benefit from continued PT treatment in order to address impairments, decrease risk of falls, maximize independence w/ fnxl mobility, & ensure safety w/ mobility for transition to next level of care  Based on pt presentation & impairments, pt would most appropriately benefit from return to facility with PT services  Goals   Patient Goals "to get started with the therapy at [facility] again "   STG Expiration Date 07/30/22   Short Term Goal #1 Patient PT goals established in order to address patient self reported goal of "to get started with the therapy at [facility] again "   Pt will: complete all bed mobility in hospital bed with MODA in order to promote increased OOB functional mobility to improve overall activity tolerance; improve B LE strength to >/= 3/5 MMT t/o in order to increase safety with functional mobility and decrease risk of falls; demonstrate understanding and independence with LE strengthening HEP; improve static sitting (unsupported) balance to >/= fair+ grade in order to promote safety and increased independence with mobility; tolerate >3hrs OOB in upright position, in order to improve muscular endurance and respiratory status; improve AM-PAC score to >/= 11/24 in order to increase independence with mobility and decrease burden of care; improve Barthel Index score to >/= 25/100 in order to increase independence and decrease risk of falls; improve Elderly Mobility Scale score to >/= 4/20 in order to decrease burden of care and increase independence with functional mobility and ADLs  PT Treatment Day 0   Plan   Treatment/Interventions LE strengthening/ROM; Therapeutic exercise; Endurance training;Cognitive reorientation;Patient/family training;Equipment eval/education; Bed mobility;Spoke to MD;Spoke to nursing;Spoke to case management   PT Frequency 2-3x/wk   Recommendation   PT Discharge Recommendation Return to facility with rehabilitation services   Additional Comments Recommend RN staffing utilization of bariatric frankie lift for all OOB mobility  AM-PAC Basic Mobility Inpatient   Turning in Bed Without Bedrails 1   Lying on Back to Sitting on Edge of Flat Bed 1   Moving Bed to Chair 1   Standing Up From Chair 1   Walk in Room 1   Climb 3-5 Stairs 1   Basic Mobility Inpatient Raw Score 6   Turning Head Towards Sound 3   Follow Simple Instructions 3   Low Function Basic Mobility Raw Score 12   Low Function Basic Mobility Standardized Score 18 33   Highest Level Of Mobility   -HLM Goal 2: Bed activities/Dependent transfer   JH-HLM Achieved 2: Bed activities/Dependent transfer   Modified [place]alis Scale   Modified [place] Scale 5   Barthel Index   Feeding 5   Bathing 0   Grooming Score 0   Dressing Score 0   Bladder Score 5   Bowels Score 5   Toilet Use Score 0   Transfers (Bed/Chair) Score 0   Mobility (Level Surface) Score 0   Stairs Score 0   Barthel Index Score 15   End of Consult   Patient Position at End of Consult Supine;Bed/Chair alarm activated; All needs within reach   End of Consult Comments Based on patient's [facility] Level of Mobility scores today, patient currently has a goal of -[facility] Levels: 2: BED ACTIVITIES/DEPENDENT TRANSFER, to be completed with RN staffing each shift, in order to improve overall activity tolerance and mobility, combat hospital related deconditioning, and maximize outcomes for d/c from the acute care setting       The patient's AM-PAC Basic Mobility Inpatient Short Form Raw Score is 6  A Raw score of less than or equal to 16 suggests the patient may benefit from discharge to post-acute rehabilitation services  Please also refer to the recommendation of the Physical Therapist for safe discharge planning  ELDERLY MOBILITY SCALE OUTCOME MEASURE:   Older Adult & Geriatric Care: ([place]; n=36; age= 70-93)  Level of independence and EMS scores:  Score > 14 = independent in basic ADLs and safe for independent mobility maneuvers (with or without device)  Score 10-13 = borderline in terms of safe mobility and independence in ADLs (require some help with ADLs and mobility maneuvers)   Score < 10 = dependent or high degree of dependency for basic ADLs and limited mobility maneuvers (such as transfers, toileting, dressing)  Discharge recommendations and EMS scores:  Score 0-6 = post acute STR / SNF / IRF  Score 7-13 = home with high levels of care - ie: skilled caretaker, community resources, family asssistance  Score 14-20 = home   Please refer to therapist full assessment & documentation for most appropriate recommendation and details for discharge       Lizbeth Fields PT, DPT   Available via [employer]  NPI # [id number]  [place] License - [id number]  5/40/5081

## 2022-07-20 NOTE — CASE MANAGEMENT
Case Management ED Progress Note    Patient name Peace Aguilera  Location ED OVR 17/ED OVR 17 MRN [id number]  : 1970 Date 2022        OBJECTIVE:  Predictive Model Details         97% Factor Value    Risk of Hospital Admission or ED Visit Model Number of ED Visits 3     Number of Hospitalizations 5+     Has Medicaid Yes     Is in Relationship No     Has Atrial Fibrillation Yes     Has COPD Yes     Has Anemia Yes     Has CVD Yes     Has Depression Yes     Has Diabetes Yes     Has CHF Yes     Has PCP Yes            Chief Complaint: S/p small bowel obstruction   Patient Class: Emergency  Preferred Pharmacy:   [address] [place] [address], [address] [place] [address]  [address] [address]  Phone: [phone] Fax: [phone]    Primary Care Provider: Shelley Roche MD    Primary Insurance: [id number] [facility]  Secondary Insurance:     ED Progress Note:    [address] ([place]) admissions made aware patient is on regular diet at this time  Wound care consult placed  [facility] stating will need 7 days of supplies for wound manager device  CM to relay wound care needs to [facility] admissions when known  PT/OT evals pending at this time  CM following

## 2022-07-20 NOTE — DISCHARGE INSTR - OTHER ORDERS
"Preventive Care at the  Visit    Growth Measurements & Percentiles  Head Circumference: 13.86\" (35.2 cm) (59 %, Source: WHO (Boys, 0-2 years)) 59 %ile based on WHO (Boys, 0-2 years) head circumference-for-age data using vitals from 2018.   Birth Weight: 7 lbs 8.28 oz   Weight: 7 lbs 5 oz / 3.32 kg (actual weight) / 34 %ile based on WHO (Boys, 0-2 years) weight-for-age data using vitals from 2018.   Length: 1' 8.866\" / 53 cm 89 %ile based on WHO (Boys, 0-2 years) length-for-age data using vitals from 2018.   Weight for length: 1 %ile based on WHO (Boys, 0-2 years) weight-for-recumbent length data using vitals from 2018.    Recommended preventive visits for your :  2 weeks old  2 months old    Here s what your baby might be doing from birth to 2 months of age.    Growth and development    Begins to smile at familiar faces and voices, especially parents  voices.    Movements become less jerky.    Lifts chin for a few seconds when lying on the tummy.    Cannot hold head upright without support.    Holds onto an object that is placed in his hand.    Has a different cry for different needs, such as hunger or a wet diaper.    Has a fussy time, often in the evening.  This starts at about 2 to 3 weeks of age.    Makes noises and cooing sounds.    Usually gains 4 to 5 ounces per week.      Vision and hearing    Can see about one foot away at birth.  By 2 months, he can see about 10 feet away.    Starts to follow some moving objects with eyes.  Uses eyes to explore the world.    Makes eye contact.    Can see colors.    Hearing is fully developed.  He will be startled by loud sounds.    Things you can do to help your child  1. Talk and sing to your baby often.  2. Let your baby look at faces and bright colors.    All babies are different    The information here shows average development.  All babies develop at their own rate.  Certain behaviors and physical milestones tend to occur at certain " Skin care plans:    1-Hydraguard to bilateral sacrum, buttock and heels BID and PRN  2-Elevate heels to offload pressure  3-Ehob cushion in chair when out of bed  4-Moisturize skin daily with skin nourishing cream   5-Turn/reposition q2h or when medically stable for pressure re-distribution on skin  6-Irrigate wound to posterior neck with normal saline, apply silver alginate, cover with Allevyn foam  Change every other day and PRN soilage/displacement  7-Cleanse abdominal wound with NS and pat dry  Place xeroform over wound bed in a single layer cut to size of wound and cover with ABD  Secure with Tape  Change Daily and PRN soilage or displacement  8-Cleanse left medial thigh wound with NS and pat dry  Place xeroform over wound bed in a single layer cut to size of wound and cover with ABD  Secure with Tape  Change Daily and PRN soilage or displacement  "ages, but there is a wide range of growth and behavior that is normal.  Your baby might reach some milestones earlier or later than the average child.  If you have any concerns about your baby s development, talk with your doctor or nurse.      Feeding  The only food your baby needs right now is breast milk or iron-fortified formula.  Your baby does not need water at this age.  Ask your doctor about giving your baby a Vitamin D supplement.    Breastfeeding tips    Breastfeed every 2-4 hours. If your baby is sleepy - use breast compression, push on chin to \"start up\" baby, switch breasts, undress to diaper and wake before relatching.     Some babies \"cluster\" feed every 1 hour for a while- this is normal. Feed your baby whenever he/she is awake-  even if every hour for a while. This frequent feeding will help you make more milk and encourage your baby to sleep for longer stretches later in the evening or night.      Position your baby close to you with pillows so he/she is facing you -belly to belly laying horizontally across your lap at the level of your breast and looking a bit \"upwards\" to your breast     One hand holds the baby's neck behind the ears and the other hand holds your breast    Baby's nose should start out pointing to your nipple before latching    Hold your breast in a \"sandwich\" position by gently squeezing your breast in an oval shape and make sure your hands are not covering the areola    This \"nipple sandwich\" will make it easier for your breast to fit inside the baby's mouth-making latching more comfortable for you and baby and preventing sore nipples. Your baby should take a \"mouthful\" of breast!    You may want to use hand expression to \"prime the pump\" and get a drip of milk out on your nipple to wake baby     (see website: newborns.[place].edu/Breastfeeding/HandExpression.html)    Swipe your nipple on baby's upper lip and wait for a BIG open mouth    YOU bring baby to the breast (hold " "baby's neck with your fingers just below the ears) and bring baby's head to the breast--leading with the chin.  Try to avoid pushing your breast into baby's mouth- bring baby to you instead!    Aim to get your baby's bottom lip LOW DOWN ON AREOLA (baby's upper lip just needs to \"clear\" the nipple).     Your baby should latch onto the areola and NOT just the nipple. That way your baby gets more milk and you don't get sore nipples!     Websites about breastfeeding  www.womenshealth.gov/breastfeeding - many topics and videos   www.breastfeedingonline.[employer]  - general information and videos about latching  http://newborns.[place].edu/Breastfeeding/HandExpression.html - video about hand expression   http://newborns.[place].edu/Breastfeeding/ABCs.html#ABCs  - general information  [employer] - [facility] - information about breastfeeding and support groups    Formula  General guidelines    Age   # time/day   Serving Size     0-1 Month   6-8 times   2-4 oz     1-2 Months   5-7 times   3-5 oz     2-3 Months   4-6 times   4-7 oz     3-4 Months    4-6 times   5-8 oz       If bottle feeding your baby, hold the bottle.  Do not prop it up.    During the daytime, do not let your baby sleep more than four hours between feedings.  At night, it is normal for young babies to wake up to eat about every two to four hours.    Hold, cuddle and talk to your baby during feedings.    Do not give any other foods to your baby.  Your baby s body is not ready to handle them.    Babies like to suck.  For bottle-fed babies, try a pacifier if your baby needs to suck when not feeding.  If your baby is breastfeeding, try having him suck on your finger for comfort--wait two to three weeks (or until breast feeding is well established) before giving a pacifier, so the baby learns to latch well first.    Never put formula or breast milk in the microwave.    To warm a bottle of formula or breast milk, place it in a bowl of warm water for a " few minutes.  Before feeding your baby, make sure the breast milk or formula is not too hot.  Test it first by squirting it on the inside of your wrist.    Concentrated liquid or powdered formulas need to be mixed with water.  Follow the directions on the can.      Sleeping    Most babies will sleep about 16 hours a day or more.    You can do the following to reduce the risk of SIDS (sudden infant death syndrome):    Place your baby on his back.  Do not place your baby on his stomach or side.    Do not put pillows, loose blankets or stuffed animals under or near your baby.    If you think you baby is cold, put a second sleep sack on your child.    Never smoke around your baby.      If your baby sleeps in a crib or bassinet:    If you choose to have your baby sleep in a crib or bassinet, you should:      Use a firm, flat mattress.    Make sure the railings on the crib are no more than 2 3/8 inches apart.  Some older cribs are not safe because the railings are too far apart and could allow your baby s head to become trapped.    Remove any soft pillows or objects that could suffocate your baby.    Check that the mattress fits tightly against the sides of the bassinet or the railings of the crib so your baby s head cannot be trapped between the mattress and the sides.    Remove any decorative trimmings on the crib in which your baby s clothing could be caught.    Remove hanging toys, mobiles, and rattles when your baby can begin to sit up (around 5 or 6 months)    Lower the level of the mattress and remove bumper pads when your baby can pull himself to a standing position, so he will not be able to climb out of the crib.    Avoid loose bedding.      Elimination    Your baby:    May strain to pass stools (bowel movements).  This is normal as long as the stools are soft, and he does not cry while passing them.    Has frequent, soft stools, which will be runny or pasty, yellow or green and  seedy.   This is  normal.    Usually wets at least six diapers a day.      Safety      Always use an approved car seat.  This must be in the back seat of the car, facing backward.  For more information, check out www.seatcheck.org.    Never leave your baby alone with small children or pets.    Pick a safe place for your baby s crib.  Do not use an older drop-side crib.    Do not drink anything hot while holding your baby.    Don t smoke around your baby.    Never leave your baby alone in water.  Not even for a second.    Do not use sunscreen on your baby s skin.  Protect your baby from the sun with hats and canopies, or keep your baby in the shade.    Have a carbon monoxide detector near the furnace area.    Use properly working smoke detectors in your house.  Test your smoke detectors when daylight savings time begins and ends.      When to call the doctor    Call your baby s doctor or nurse if your baby:      Has a rectal temperature of 100.4 F (38 C) or higher.    Is very fussy for two hours or more and cannot be calmed or comforted.    Is very sleepy and hard to awaken.      What you can expect      You will likely be tired and busy    Spend time together with family and take time to relax.    If you are returning to work, you should think about .    You may feel overwhelmed, scared or exhausted.  Ask family or friends for help.  If you  feel blue  for more than 2 weeks, call your doctor.  You may have depression.    Being a parent is the biggest job you will ever have.  Support and information are important.  Reach out for help when you feel the need.      For more information on recommended immunizations:    www.cdc.gov/nip    For general medical information and more  Immunization facts go to:  www.aap.org  www.aafp.org  www.[place].org  www.cdc.gov/hepatitis  www.immunize.org  www.immunize.org/express  www.immunize.org/stories  www.vaccines.org    For early childhood family education programs in your school  district, go to: www1.minn.net/~ecfe    For help with food, housing, clothing, medicines and other essentials, call:  [employer] - at 395-865-0800      How often should my child/teen be seen for well check-ups?       (5-8 days)    2 weeks    2 months    4 months    6 months    9 months    12 months    15 months    18 months    24 months    30 months    3 years and every year through 18 years of age

## 2022-07-20 NOTE — ASSESSMENT & PLAN NOTE
Patient denies of any dizziness or lightheadedness  Blood pressure was checked twice with the bigger cuff  Will also check a manual blood pressure  Patient is on metoprolol which will be held  Patient also received pain medication with Dilaudid and oxycodone  Patient is asymptomatic

## 2022-07-20 NOTE — ED NOTES
Patient refused morning medications for 0600 and 0700 at this time, states he would like to take them when he eats breakfast later this morning  Patient remains resting with Bipap in place at this time       Abhinav Caban RN  07/20/22 8974

## 2022-07-20 NOTE — ASSESSMENT & PLAN NOTE
Body mass index is 70 15 kg/m²      · Encourage diet and weight modification  · Hx of gastric bypass  · Lives at SNF, requires assistance with ADLs

## 2022-07-20 NOTE — ASSESSMENT & PLAN NOTE
· Admitted 6/14-7/19 for SBO S/P multiple surgeries at [facility]  · Continue wound care for abdomen and LLE donor site  · Right abdominal drain in place  · Wound consulted  · Last reported BM 7/19  · Continue bowel regimen, encourage PO intake  · PRN pain medication  · Rest per surgical team

## 2022-07-20 NOTE — CASE MANAGEMENT
Case Management Progress Note    Patient name Peace Aguilera  Location ED OVR 17/ED OVR 17 MRN [id number]  : 1970 Date 2022       LOS (days): 0  Geometric Mean LOS (GMLOS) (days):   Days to GMLOS:        OBJECTIVE:        Current admission status: Emergency  Preferred Pharmacy:   [address] [place] [address]  Phone: [phone] Fax: [phone]    Primary Care Provider: Shelley Roche MD    Primary Insurance: [id number] [employer]  Secondary Insurance:     PROGRESS NOTE:    CM s/w Jacob Parker from [facility]  Jacob Parker confirmed admin has approved patient's return  Jacob Parker stating she will need:  - nursing documentation that patient is tolerating regular/PO diet  - 7 days worth of wound care supplies  - photos/ID numbers for wound care supplies for [facility] to order for patient  - documentation supporting no increased output from fistula  Photos of supplies obtained from [address] [place] [address] and forwarded to Jacob Parker @ [facility]  7 days worth of supplies being collected at this time  Authorization pending for patient's return to [facility]  ED provider made aware of above  CM following

## 2022-07-20 NOTE — ED CARE HANDOFF
Emergency Department Sign Out Note        Sign out and transfer of care from Dr Mark Ochoa  See Separate Emergency Department note  The patient, Jayro Pruitt, was evaluated by the previous provider for failure to be accepted at nursing facility  Workup Completed:      ED Course / Workup Pending (followup): Home medications ordered  Pending placement by case management  case management continues to review the case  Per case management might be able to go back to [facility] ([facility])  They are working on placement  Spoke with general surgery who evaluated the patient in the emergency department  They are putting an additional orders regarding wound care  ED Course as of 07/20/22 1707 Wed Jul 20, 2022   1129 Hemoglobin(!): 7 5  At baseline       Procedures  MDM        Disposition  Final diagnoses:   Post-op pain   Morbid obesity ([address] )   Obesity hypoventilation syndrome ([facility] [address] )     Time reflects when diagnosis was documented in both MDM as applicable and the Disposition within this note     Time User Action Codes Description Comment    7/19/2022  6:54 PM Venessa VELÁZQUEZ Add [G89 18] Post-op pain     7/19/2022  7:25 PM Hill Ansari Add [E66 01] Morbid obesity ([facility] [address] )     7/19/2022  7:25 PM Hill Ansari Add [E66 2] Obesity hypoventilation syndrome ([facility] [address] )     7/20/2022  7:46 AM Neomia Simms Add [Z87 19] S/p small bowel obstruction       ED Disposition     ED Disposition   Transfer to Another Facility-In Network    Condition   --    Date/Time   Tue Jul 19, 2022  6:54 PM    Comment   Jayro Pruitt [facility] depending on bed           Follow-up Information    None       Patient's Medications   Discharge Prescriptions    No medications on file     No discharge procedures on file         ED Provider  Electronically Signed by     Lupillo Vaughan,   07/20/22 Sarah Velasquez 95 DO  07/20/22 1707

## 2022-07-20 NOTE — ED CARE HANDOFF
Emergency Department Sign Out Note        Sign out and transfer of care from Dr Jaylin Lozano  See Separate Emergency Department note  The patient, Candice Null, was evaluated by the previous provider for medical screening exam, wound care and med administration  Workup Completed:  Labs    ED Course / Workup Pending (followup): No acute events overnight  Patient is here awaiting transport to a long-term care facility for management of his chronic wounds  Case endorsed to oncoming ED physician at shift change  Procedures  MDM        Disposition  Final diagnoses:   Post-op pain   Morbid obesity ([address] )   Obesity hypoventilation syndrome ([address] )     Time reflects when diagnosis was documented in both MDM as applicable and the Disposition within this note     Time User Action Codes Description Comment    7/19/2022  6:54 PM Daykelvin Guzmán K Add [G89 18] Post-op pain     7/19/2022  7:25 PM [facility] K Add [E66 01] Morbid obesity ([facility] [address] )     7/19/2022  7:25 PM Yulisa Ansari Add [E66 2] Obesity hypoventilation syndrome [facility])       ED Disposition     ED Disposition   Transfer to Another Facility-In Network    Condition   --    Date/Time   Tue Jul 19, 2022  6:54 PM    Comment   Candice Null [facility] on bed           Follow-up Information    None       Patient's Medications   Discharge Prescriptions    No medications on file     No discharge procedures on file         ED Provider  Electronically Signed by     Cj Boykin MD  07/20/22 7780

## 2022-07-20 NOTE — ED NOTES
Pt turned, changed, and cleaned with help of other RN's and techs  Pt wound dressing on his leg was given new Xeroform dressing  Pt dressed in new gown and given fresh ice water per his request  SCD's applied to bilateral legs  Pt has call bell within reach and side rails up x2  Pt stated he will ring when he is ready to use the bathroom again and offers no complaints at this time        Alexys Slater RN  07/20/22 4475

## 2022-07-20 NOTE — ASSESSMENT & PLAN NOTE
· Does not appear to be in an acute exacerbation  · Chronically on 2-4 L baseline  · Continue home breathing treatments  · CPAP HS

## 2022-07-20 NOTE — ED NOTES
Unable to aspirate blood from pts PICC line, Dr Tamica Jacobs aware       [address], RN  07/20/22 3906

## 2022-07-20 NOTE — PLAN OF CARE
Problem: OCCUPATIONAL THERAPY ADULT  Goal: Performs self-care activities at highest level of function for planned discharge setting  See evaluation for individualized goals  Description: Treatment Interventions: ADL retraining, Functional transfer training, UE strengthening/ROM, Endurance training, Cognitive reorientation, Patient/family training, Equipment evaluation/education, Compensatory technique education, Continued evaluation, Energy conservation, Activityengagement          See flowsheet documentation for full assessment, interventions and recommendations  Note: Limitation: Decreased ADL status, Decreased UE strength, Decreased Safe judgement during ADL, Decreased cognition, Decreased endurance, Decreased high-level ADLs, Decreased self-care trans  Prognosis: Fair  Assessment: Patient is a 47 y o  male seen for OT evaluation s/p admit to [address] [place] [address] on 7/19/2022 w/S/p small bowel obstruction  Comorbidities affecting patient's functional performance at time of assessment include: chronic diastolic heart failure, hx of DVT, diabetes, depression, COPD, hypertension and obesity  Orders received for OT evaluation and treatment  Patient identified during session through name and wristband  PTA, patient was dependent for mobility, requiring assist for ADLS, requiring assist for IADLS and a resident of long term care  Personal factors affecting patient at time of initial evaluation include: limited insight into deficits, decreased initiation and engagement, difficulty performing ADLs and difficulty performing IADLs  Patient is alert and oriented x 4 and presents with ability to recognize a problem, define a problem, identify alternative plan, select a plan, organize steps in a plan, implement plan and evaluate outcome (problem solving)   The evaluation identifies the following performance deficits: weakness, decreased endurance, increased fall risk, new onset of impairment of functional mobility, decreased ADLS, decreased IADLS, pain, decreased activity tolerance, decreased safety awareness, SOB upon exertion, decreased cognition and decreased strength, that result in activity limitations  Based on the OT evaluation outcomes, functional performance deficits, and assessment findings, pt has been identified as high complexity, because the patient presents with comorbidites that affect occupational performance and required significant modification of tasks or assistance with consideration of multiple treatment options  The patient's raw score on the AM-PAC Daily Activity inpatient short form is 14, standardized score is 33 39, less than 39 4  Patient to benefit from continued Occupational Therapy treatment to address above deficits and maximize level of independence with ADLs and functional mobility  Occupational Performance areas to address include: grooming , bathing/ shower, dressing, toilet hygiene and transfer to all surfaces  From OT standpoint, recommendation at time of d/c would be Return to facility with rehabilitation services       OT Discharge Recommendation: Return to facility with rehabilitation services     Mandeep Lal OT

## 2022-07-20 NOTE — PROGRESS NOTES
Adela U  67   Progress Note - Avinash Dietrich 1970, 47 y o  male MRN: [id number]  Unit/Bed#: ED OVR 17 Encounter: [id number]  Primary Care Provider: Courtney Escobar MD   Date and time admitted to hospital: 7/19/2022  4:28 PM    Chronic diastolic heart failure [facility])  Assessment & Plan  Wt Readings from Last 3 Encounters:   07/19/22 (!) 228 kg (503 lb)   07/19/22 (!) 229 kg (503 lb 15 5 oz)   06/14/22 (!) 238 kg (525 lb)     · Does not appear to be in an exacerbation  · Fluid status difficult to assess given morbid obesity, weight appears stable  · HOLD diuretics with soft Bps  · Daily weights, I&Os  · Monitor for fluid overload while diuretics are on hold    Hypertension  Assessment & Plan  · BP low in ED  · Monitor renal function  · HOLD Spironolactone and Torsemide until BP improves  · Monitor for fluid overload    Hypotension  Assessment & Plan  Patient denies of any dizziness or lightheadedness  Blood pressure was checked twice with the bigger cuff  Will also check a manual blood pressure  Patient is on metoprolol which will be held  Patient also received pain medication with Dilaudid and oxycodone  Patient is asymptomatic  History of DVT (deep vein thrombosis)  Assessment & Plan  · Pradaxa BID    COPD (chronic obstructive pulmonary disease) (HCC)  Assessment & Plan  · Does not appear to be in an acute exacerbation  · Chronically on 2-4 L baseline  · Continue home breathing treatments  · CPAP HS    Atrial fibrillation (HCC)  Assessment & Plan  · HR currently controlled  · Continue Pradaxa, Digoxin, Metoprolol    Morbid obesity (HCC)  Assessment & Plan  Body mass index is 70 15 kg/m²      · Encourage diet and weight modification  · Hx of gastric bypass  · Lives at SNF, requires assistance with ADLs    * S/p small bowel obstruction  Assessment & Plan  · Admitted 6/14-7/19 for SBO S/P [facility] surgeries [facility]  · Continue wound care for abdomen and LLE donor site  · Right abdominal drain in place  · Wound consulted  · Last reported BM 7/19  · Continue bowel regimen, encourage PO intake  · PRN pain medication  · Rest per surgical team          Subjective/Objective     Subjective:   Patient is feeling well however complains of abdominal pain at the surgical site  Denies of dizziness or lightheadedness  Patient is sitting up and having his lunch  Objective:  Vitals: Blood pressure (!) 94/45, pulse 90, temperature 97 8 °F (36 6 °C), temperature source Oral, resp  rate (!) 38, weight (!) 228 kg (503 lb), SpO2 99 %  ,Body mass index is 70 15 kg/m²  No intake or output data in the 24 hours ending 07/20/22 1653    Invasive Devices  Report    Peripherally Inserted Central Catheter Line  Duration           PICC Line 05/44/37 Right Basilic 37 days          Drain  Duration           Open Drain Medial RUQ 5 days                Physical Exam:   HEENT-PERRLA, moist oral mucosa  Neck-supple, no JVD elevation   Respiratory-decreased air entry bilaterally,  Cardiovascular system-S1, S2 heard, no murmur or gallops or rubs  Abdomen-soft, nontender, no guarding or rigidity, bowel sounds heard, abdominal wound bandaged, drain in-situ, left thigh skin graft site is bandaged  Morbidly obese,  Extremities-no pedal edema  Peripheral pulses palpable  Musculoskeletal-no contractures  Central nervous system-no acute focal neurological deficit ,no sensory or motor deficit noted  Skin-no rash noted        Lab, Imaging and other studies: I have personally reviewed pertinent reports      VTE Pharmacologic Prophylaxis: Reason for no pharmacologic prophylaxis Patient is on Pradaxa  VTE Mechanical Prophylaxis: sequential compression device

## 2022-07-20 NOTE — ED NOTES
Pt currently in bariatric bed, watching tv  No distress noted at this time and patient offers no complaints  Call bell is within reach  For pt use        Adan Lynne RN  07/20/22 9287

## 2022-07-20 NOTE — ASSESSMENT & PLAN NOTE
· Admitted 6/14-7/19 for SBO S/P multiple surgeries at [facility]  · Continue wound care for abdomen and LLE donor site  · Right abdominal drain in place  · Wound consulted  · Last reported BM 7/19  · Continue bowel regimen, encourage PO intake  · PRN pain medication  · Rest per surgical team

## 2022-07-20 NOTE — CONSULTS
[address] L Seese 1970, 47 y o  male MRN: [id number]  Unit/Bed#: ED OVR 17 Encounter: [id number]  Primary Care Provider: Gustavo Barber MD   Date and time admitted to hospital: 7/19/2022  4:28 PM    Consult to internal medicine  Consult performed by: RADHA Lezama  Consult ordered by: Armani Braden, DO          * S/p small bowel obstruction  Assessment & Plan  · Admitted 6/14-7/19 for SBO S/P multiple surgeries at [facility]  · Continue wound care for abdomen and LLE donor site  · Right abdominal drain in place  · Wound consulted  · Last reported BM 7/19  · Continue bowel regimen, encourage PO intake  · PRN pain medication  · Rest per surgical team    Hypertension  Assessment & Plan  · BP low in ED  · Monitor renal function  · HOLD Spironolactone and Torsemide until BP improves  · Monitor for fluid overload    History of DVT (deep vein thrombosis)  Assessment & Plan  · Pradaxa BID    COPD (chronic obstructive pulmonary disease) ([facility] )  Assessment & Plan  · Does not appear to be in an acute exacerbation  · Chronically on 2-4 L baseline  · Continue home breathing treatments  · CPAP HS    Atrial fibrillation (HCC)  Assessment & Plan  · HR currently controlled  · Continue Pradaxa, Digoxin, Metoprolol    Chronic diastolic heart failure ([facility] )  Assessment & Plan  Wt Readings from Last 3 Encounters:   07/19/22 (!) 228 kg (503 lb)   07/19/22 (!) 229 kg (503 lb 15 5 oz)   06/14/22 (!) 238 kg (525 lb)     · Does not appear to be in an exacerbation  · Fluid status difficult to assess given morbid obesity, weight appears stable  · HOLD diuretics with soft Bps  · Daily weights, I&Os  · Monitor for fluid overload while diuretics are on hold    Morbid obesity ([facility] )  Assessment & Plan  Body mass index is 70 15 kg/m²      · Encourage diet and weight modification  · Hx of gastric bypass  · Lives at SNF, requires assistance with ADLs      VTE Prophylaxis:   High Risk (Score >/= 5) - Pharmacological DVT Prophylaxis Ordered: dabigatran (Pradaxa)  Sequential Compression Devices Ordered  Recommendations for Discharge:  · Pending primary/Surgical team recommendations    Counseling / Coordination of Care Time: 45 minutes Greater than 50% of total time spent on patient counseling and coordination of care  Collaboration of Care: Were Recommendations Directly Discussed with Primary Treatment Team? No    History of Present Illness:  Gina Levy is a 47 y o  male who is originally admitted to the [facility] service due to SBO repair  We are consulted for medical management while patient is awaiting bed at [place] [address]  Patient was originally admitted from 6/17-7/19 to the surgical service for a SBO that required multiple trips to the OR  Patient was discharged today and was sent to a SNF that refused to accept him  He was sent to the [facility] ED as it was the closest hospital   He is awaiting a bed at [facility] to continue care with the surgical team while he is continuing to need significant wound care to his surgical sites  Review of Systems:  Review of Systems   Constitutional: Negative for diaphoresis, fatigue and fever  HENT: Negative for congestion, postnasal drip, sore throat and trouble swallowing  Eyes: Negative for visual disturbance  Respiratory: Negative for chest tightness, shortness of breath and wheezing  Cardiovascular: Negative for chest pain, palpitations and leg swelling  Gastrointestinal: Positive for abdominal pain  Negative for constipation, diarrhea, nausea and vomiting  Genitourinary: Negative for dysuria, hematuria and urgency  Musculoskeletal: Negative for arthralgias and myalgias  Skin: Positive for wound  Negative for pallor and rash  Neurological: Negative for dizziness, weakness, light-headedness and headaches  Psychiatric/Behavioral: Negative for confusion  The patient is not nervous/anxious          Past Medical and Surgical History:   Past Medical History:   Diagnosis Date    Afib [facility] )     Anemia     Anxiety     Cancer ([place] [address] )     Cardiac disease     Cellulitis     COPD (chronic obstructive pulmonary disease) ([place] [address] )     Depression     Diabetes mellitus ([place] [address] )     DVT (deep venous thrombosis) (HCC)     GERD (gastroesophageal reflux disease)     HBP (high blood pressure)     Heart failure (HCC)     Hx of blood clots     Hypertension     Hypokalemia     Hypothyroid     Obesity     Psychiatric disorder        Past Surgical History:   Procedure Laterality Date    ABDOMINAL HERNIA REPAIR  05/2019    CHOLECYSTECTOMY      Lap    GASTRECTOMY SLEEVE LAPAROSCOPIC  08/2018    INCISION AND DRAINAGE OF WOUND Bilateral 12/01/2021    Procedure: INCISION AND DRAINAGE (I&D) HEAD/FACE;  Surgeon: Lilo Dash MD;  Location:  MAIN OR;  Service: General    IR PICC PLACEMENT DOUBLE LUMEN  06/13/2022    IVC FILTER INSERTION  2018    KNEE SURGERY Right     open wound, injury     LAPAROTOMY N/A 6/14/2022    Procedure: LAPAROTOMY EXPLORATORY, EXTENSIVE LYSIS OF ADHESIONS, APPLICATION OF ABTHERA WOUND VAC;  Surgeon: Deborah Benavides MD;  Location: [address] [place] [address];  Service: General    LAPAROTOMY N/A 6/15/2022    Procedure: LAPAROTOMY EXPLORATORY WITH REPAIR OF SEROSAL INJURY;  Surgeon: iRcardo Colmenares MD;  Location:  MAIN OR;  Service: General    LEG SURGERY Right 2009    SPLIT THICKNESS SKIN GRAFT N/A 6/30/2022    Procedure: incision and drainage, wash out vac change;  Surgeon: Liz Mulligan DO;  Location: [facility];  Service: General    SPLIT THICKNESS SKIN GRAFT N/A 7/5/2022    Procedure: PREPERATION RECIPIENT STSG SITE, DEBRIDEMENT NONVIABLE SKIN EDGE; PREPERATION RECIPIENT SKIN GRAFT SITE; APPLICATION OF SKIN SUBSTITUTE TO DONOR SITE; APPLICATION WOUND VAC X2 GREATER THAN 50CM;   Surgeon: Nany Pena DO;  Location: [facility];  Service: General    US GUIDED VASCULAR ACCESS  08/06/2018    VAC DRESSING APPLICATION N/A 6/28/4791    Procedure: CHANGE DRESSING/VAC ABDOMEN;  Surgeon: Elysia Belle MD;  [facility];  Service: General    VAC DRESSING APPLICATION N/A 2/51/4851    Procedure: ABDOMINAL WASHOUT, REPAIR OF SEROSAL TEARS,BRIDING VICRYL MESH, PARTIAL CLOSURE, APPLICATION OF WOUND VAC;  Surgeon: Alber Chinchilla DO;  [facility];  Service: General    VAC DRESSING APPLICATION N/A 2/52/2838    Procedure: CHANGE DRESSING/VAC ABDOMEN;  Surgeon: Alber Chinchilla DO;  [facility];  Service: General       Meds/Allergies:  all medications and allergies reviewed    Allergies: Allergies   Allergen Reactions    Penicillins Hives       Social History:  Marital Status: Single  Substance Use History:   Social History     Substance and Sexual Activity   Alcohol Use Not Currently     Social History     Tobacco Use   Smoking Status Former Smoker    Packs/day: 1 00    Years: 15 00    Pack years: 15 00   Smokeless Tobacco Never Used   Tobacco Comment    1 5ppd x 23 years, quit 2014     Social History     Substance and Sexual Activity   Drug Use No       Family History:  Family History   Problem Relation Age of Onset    Lung cancer Father     Diabetes Maternal Aunt     Heart attack Mother     Clotting disorder Mother     Hypertension Mother     Heart failure Mother     Diabetes Mother     Atrial fibrillation Mother     Sleep apnea Mother     Obesity Mother     Asthma Sister     Stroke Neg Hx     Anuerysm Neg Hx     Arrhythmia Neg Hx     Hyperlipidemia Neg Hx         pt unsure    Fainting Neg Hx        Physical Exam:   Vitals:   Blood Pressure: (!) 100/41 (07/19/22 2052)  Pulse: 75 (07/19/22 2052)  Temperature: 98 6 °F (37 °C) (07/19/22 1636)  Temp Source: Oral (07/19/22 1636)  Respirations: 22 (07/19/22 2052)  Weight - Scale: (!) 228 kg (503 lb) (07/19/22 1924)  SpO2: 97 % (07/19/22 2052)    Physical Exam  Vitals reviewed     Constitutional:       General: He is not in acute distress  Appearance: He is obese  He is not ill-appearing or diaphoretic  Interventions: Nasal cannula in place  HENT:      Head: Normocephalic  Cardiovascular:      Rate and Rhythm: Normal rate  Pulmonary:      Effort: Pulmonary effort is normal       Breath sounds: Normal breath sounds  Abdominal:      General: Bowel sounds are decreased  Palpations: Abdomen is soft  Tenderness: There is generalized abdominal tenderness  Comments: Drain noted right abdomen  Midline surgical site  Dressing C/D/I   Musculoskeletal:         General: Normal range of motion  Cervical back: Normal range of motion  Right lower leg: No edema  Left lower leg: No edema  Skin:     General: Skin is warm and dry  Capillary Refill: Capillary refill takes less than 2 seconds  Coloration: Skin is not pale  Comments: Wound posterior neck  Left thigh skin donor site with xeroform dressing   Neurological:      Mental Status: He is alert and oriented to person, place, and time  Mental status is at baseline  Motor: No weakness     Psychiatric:         Mood and Affect: Mood normal          Behavior: Behavior normal           Additional Data:   Lab Results:    Results from last 7 days   Lab Units 07/19/22 0443 07/18/22  0528   WBC Thousand/uL 4 41 4 08*   HEMOGLOBIN g/dL 7 3* 7 1*   HEMATOCRIT % 26 0* 24 3*   PLATELETS Thousands/uL 159 169   BANDS PCT %  --  4   NEUTROS PCT % 49  --    LYMPHS PCT % 38  --    LYMPHO PCT %  --  27   MONOS PCT % 9  --    MONO PCT %  --  3*   EOS PCT % 1 1     Results from last 7 days   Lab Units 07/19/22  0443   SODIUM mmol/L 139   POTASSIUM mmol/L 4 6   CHLORIDE mmol/L 104   CO2 mmol/L 36*   BUN mg/dL 6   CREATININE mg/dL 0 52*   ANION GAP mmol/L -1*   CALCIUM mg/dL 8 4   GLUCOSE RANDOM mg/dL 103             Lab Results   Component Value Date/Time    HGBA1C 5 6 06/07/2022 06:20 AM    HGBA1C 5 4 03/21/2022 05:58 AM    HGBA1C 5 5 12/22/2021 05:32 AM    HGBA1C 5 3 11/27/2021 05:41 AM    HGBA1C 5 3 06/30/2021 06:41 AM     Results from last 7 days   Lab Units 07/19/22  1122 07/19/22  0739 07/18/22 2129 07/18/22  1658 07/18/22  1117 07/17/22  2116 07/17/22  1702 07/17/22  1246 07/17/22  0749 07/16/22  2051 07/16/22  1703 07/16/22  1146   POC GLUCOSE mg/dl 103 107 140 115 117 120 119 108 91 151* 137 108           Imaging: No pertinent imaging reviewed  No orders to display       EKG, Pathology, and Other Studies Reviewed on Admission:   · EKG: No EKG obtained  ** Please Note: This note may have been constructed using a voice recognition system   **

## 2022-07-20 NOTE — ASSESSMENT & PLAN NOTE
· BP low in ED  · Monitor renal function  · HOLD Spironolactone and Torsemide until BP improves  · Monitor for fluid overload

## 2022-07-20 NOTE — OCCUPATIONAL THERAPY NOTE
Occupational Therapy Evaluation      Madan Philippe    7/20/2022    Patient Active Problem List   Diagnosis    Atrial fibrillation with rapid ventricular response (HCC)    Morbid obesity (HCC)    Tremor    MRSA (methicillin resistant Staphylococcus aureus) Tracheobronchitis    Obesity hypoventilation syndrome ([facility] [address] )    Chronic diastolic heart failure ([facility] [address] )    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HCC)    Recurrent bronchospasm    Venous stasis dermatitis of both lower extremities    S/p small bowel obstruction    Foot pain    Knee pain    Medical non-compliance    Atrial fibrillation (HCC)    COPD (chronic obstructive pulmonary disease) ([facility] [address] [address] )    Hypothyroidism    QT prolongation    History of DVT (deep vein thrombosis)    Positive blood culture    Elevated troponin    Cellulitis of multiple sites of head and neck    Chronic respiratory failure with hypoxia (HCC)    Epidermoid cyst of neck    Intractable abdominal pain    Hypertension    Diabetes mellitus ([facility] )    Depression    Acute respiratory failure (HCC)    Hyponatremia       Past Medical History:   Diagnosis Date    Afib ([facility] )     Anemia     Anxiety     Cancer ([facility] )     Cardiac disease     Cellulitis     COPD (chronic obstructive pulmonary disease) ([facility] )     Depression     Diabetes mellitus ([facility] )     DVT (deep venous thrombosis) (HCC)     GERD (gastroesophageal reflux disease)     HBP (high blood pressure)     Heart failure (HCC)     Hx of blood clots     Hypertension     Hypokalemia     Hypothyroid     Obesity     Psychiatric disorder        Past Surgical History:   Procedure Laterality Date    ABDOMINAL HERNIA REPAIR  05/2019    CHOLECYSTECTOMY      Lap    GASTRECTOMY SLEEVE LAPAROSCOPIC  08/2018    INCISION AND DRAINAGE OF WOUND Bilateral 12/01/2021    Procedure: INCISION AND DRAINAGE (I&D) HEAD/FACE;  Surgeon: Sri Davis MD;  Location:  MAIN OR;  Service: General    IR PICC PLACEMENT DOUBLE LUMEN  06/13/2022    IVC FILTER INSERTION  2018    KNEE SURGERY Right open wound, injury     LAPAROTOMY N/A 6/14/2022    Procedure: LAPAROTOMY EXPLORATORY, EXTENSIVE LYSIS OF ADHESIONS, APPLICATION OF ABTHERA WOUND VAC;  Surgeon: Indira Gomes MD;  Location: [address] [place] [address];  Service: General    LAPAROTOMY N/A 6/15/2022    Procedure: LAPAROTOMY EXPLORATORY WITH REPAIR OF SEROSAL INJURY;  Surgeon: Debborah Lundborg, MD;  Location: [facility];  Service: General    LEG SURGERY Right 2009    SPLIT THICKNESS SKIN GRAFT N/A 6/30/2022    Procedure: incision and drainage, wash out vac change;  Surgeon: Kim Neves DO;  Location[facility];  Service: General    SPLIT THICKNESS SKIN GRAFT N/A 7/5/2022    Procedure: PREPERATION RECIPIENT STSG SITE, DEBRIDEMENT NONVIABLE SKIN EDGE; PREPERATION RECIPIENT SKIN GRAFT SITE; APPLICATION OF SKIN SUBSTITUTE TO DONOR SITE; APPLICATION WOUND VAC X2 GREATER THAN 50CM;   Surgeon: Teodora Figueroa DO;  Location[facility];  Service: General    US GUIDED VASCULAR ACCESS  08/06/2018    VAC DRESSING APPLICATION N/A 2/32/4633    Procedure: CHANGE DRESSING/VAC ABDOMEN;  Surgeon: Debborah Lundborg, MD;  [facility];  Service: General    VAC DRESSING APPLICATION N/A 4/18/2602    Procedure: ABDOMINAL WASHOUT, REPAIR OF SEROSAL TEARS,BRIDING VICRYL MESH, PARTIAL CLOSURE, APPLICATION OF WOUND VAC;  Surgeon: Kim Neves DO;  [facility];  Service: General    VAC DRESSING APPLICATION N/A 8/82/5158    Procedure: CHANGE DRESSING/VAC ABDOMEN;  Surgeon: Kim Neves DO;  [facility];  Service: General        07/20/22 1505   OT Last Visit   OT Visit Date 07/20/22   Note Type   Note type Evaluation   Additional Comments Pt presents 7/19/2022 after prolonged hospitalization at [facility] 6/14/22 - 7/18/22 for extensive/mulitple abdominal surgeries, dx S/p small bowel obstruction; was d/c'd from [facility] 7/18/22 to return to [facility] however facility was unaware of transition and unable to accept pt, therefore pt was taken to [facility] hospital, requires insurance authorization for return to facility  Restrictions/Precautions   Weight Bearing Precautions Per Order No   Other Precautions Cognitive; Chair Alarm; Bed Alarm;Multiple lines;Telemetry;O2;Fall Risk;Pain   Pain Assessment   Pain Assessment Tool 0-10   Pain Score 10 - Worst Possible Pain  (pt reports 20/10)   Pain Location/Orientation Location: Abdomen   Pain Onset/Description Onset: Ongoing; Descriptor: Aching   Effect of Pain on Daily Activities limits comfort and participation in ADLs   Patient's Stated Pain Goal No pain   Hospital Pain Intervention(s) Repositioned; Ambulation/increased activity; Emotional support   Home Living   Type of Home SNF  (Pt is a long term resident at [address] [place] [address])   Home Layout One level;Performs ADLs on one level; Able to live on main level with bedroom/bathroom   Prior Function   Level of [place] Needs assistance with ADLs and functional mobility   Lives With Facility staff   Receives Help From Personal care attendant   ADL Assistance Needs assistance   IADLs Needs assistance   Falls in the last 6 months 0   Vocational On disability   Comments Pt reports being a long term resident at [facility]  Pt reports he cannot remember the last time he has gotten OOB  Pt spoke of using a frankie lift for OOB mobility prior to this recent hospitalization  Pt also reports he was able to mobilize to EOB to engage in bedside activities including bathing and feeding  Pt reports was not getting therapy at [facility]  Pt would like to work with therapy to regain function     Lifestyle   Autonomy Pt is a long tem resident at [address] [place] [address]   Reciprocal Relationships Supportive facility staff at the [facility] to Others On disability   Intrinsic Gratification Pt values his independence and reports he would like to work towards regaining function   Subjective   Subjective "I just want to get back to where I was"   ADL   Eating Assistance 5  Supervision/Setup   Eating Deficit Setup; Beverage management  (pt able to bring water to mouth)   UB Dressing Assistance 5  Supervision/Setup   UB Dressing Deficit Setup  (pt able to adjust gown in supine)   LB Dressing Assistance 1  Total Assistance   LB Dressing Deficit Don/doff R sock; Don/doff L sock   Bed Mobility   Supine to Sit Unable to assess   Sit to Supine Unable to assess   Additional Comments MAX A x 2 for repositioning towards \[facility]\"" with trendelenburg  Pt able to assist with scooting towards [facility] with BUE support on bedrails  Transfers   Sit to Stand Unable to assess   Stand to Sit Unable to assess   Activity Tolerance   Activity Tolerance Patient limited by fatigue;Patient limited by pain   Medical Staff Made Aware Care coordinated with PT Roxy Vaughan   Nurse Made Aware yes, RN ok to see pt   RUE Assessment   RUE Assessment WFL  (grossly 4/5 MMT)   LUE Assessment   LUE Assessment WFL  (grossly 4/5 MMT)   Hand Function   Gross Motor Coordination Functional   Fine Motor Coordination Functional  (tremot noted in hands, pt reports this is due to weakness)   Sensation   Light Touch No apparent deficits   Vision-Basic Assessment   Current Vision Does not wear glasses   Cognition   Overall Cognitive Status Impaired   Arousal/Participation Alert; Cooperative   Attention Within functional limits   Orientation Level Oriented X4   Memory Decreased recall of precautions;Decreased recall of recent events;Decreased short term memory   Following Commands Follows one step commands without difficulty   Comments Pt pleasant and agreeable to OT session  Able to express wants and needs  Pt motivated to engage in therapy but with limited insight into deficits  Assessment   Limitation Decreased ADL status; Decreased UE strength;Decreased Safe judgement during ADL;Decreased cognition;Decreased endurance;Decreased high-level ADLs; Decreased self-care trans   Prognosis Fair   Assessment Patient is a 47 y o  male seen for OT evaluation s/p admit to [facility] on 7/19/2022 w/S/p small bowel obstruction  Comorbidities affecting patient's functional performance at time of assessment include: chronic diastolic heart failure, hx of DVT, diabetes, depression, COPD, hypertension and obesity  Orders received for OT evaluation and treatment  Patient identified during session through name and wristband  PTA, patient was dependent for mobility, requiring assist for ADLS, requiring assist for IADLS and a resident of long term care  Personal factors affecting patient at time of initial evaluation include: limited insight into deficits, decreased initiation and engagement, difficulty performing ADLs and difficulty performing IADLs  Patient is alert and oriented x 4 and presents with ability to recognize a problem, define a problem, identify alternative plan, select a plan, organize steps in a plan, implement plan and evaluate outcome (problem solving)  The evaluation identifies the following performance deficits: weakness, decreased endurance, increased fall risk, new onset of impairment of functional mobility, decreased ADLS, decreased IADLS, pain, decreased activity tolerance, decreased safety awareness, SOB upon exertion, decreased cognition and decreased strength, that result in activity limitations  Based on the OT evaluation outcomes, functional performance deficits, and assessment findings, pt has been identified as high complexity, because the patient presents with comorbidites that affect occupational performance and required significant modification of tasks or assistance with consideration of multiple treatment options  The patient's raw score on the AM-PAC Daily Activity inpatient short form is 14, standardized score is 33 39, less than 39 4  Patient to benefit from continued Occupational Therapy treatment to address above deficits and maximize level of independence with ADLs and functional mobility   Occupational Performance areas to address include: grooming , bathing/ shower, dressing, toilet hygiene and transfer to all surfaces  From OT standpoint, recommendation at time of d/c would be Return to facility with rehabilitation services  Goals   Patient Goals to start therapy again   LTG Time Frame 10-14   Long Term Goal #1 see goals below   Plan   Treatment Interventions ADL retraining;Functional transfer training;UE strengthening/ROM; Endurance training;Cognitive reorientation;Patient/family training;Equipment evaluation/education; Compensatory technique education;Continued evaluation; Energy conservation; Activityengagement   Goal Expiration Date 07/30/22   OT Treatment Day 0   OT Frequency 2-3x/wk   Recommendation   OT Discharge Recommendation Return to facility with rehabilitation services   Additional Comments  Pt seen as a co-eval with PT due to the patient's co-morbidities, clinically unstable presentation, and present impairments which are a regression from the patient's baseline  Additional Comments 2 At end of session, pt supine in bed with all needs met and call bell within reach     AM-PAC Daily Activity Inpatient   Lower Body Dressing 1   Bathing 2   Toileting 1   Upper Body Dressing 2   Grooming 4   Eating 4   Daily Activity Raw Score 14   Daily Activity Standardized Score (Calc for Raw Score >=11) 33 39   AM-PAC Applied Cognition Inpatient   Following a Speech/Presentation 3   Understanding Ordinary Conversation 4   Taking Medications 2   Remembering Where Things Are Placed or Put Away 3   Remembering List of 4-5 Errands 2   Taking Care of Complicated Tasks 2   Applied Cognition Raw Score 16   Applied Cognition Standardized Score 35 03     GOALS:    *Goals established to promote patient goal of to start therapy again:      *UB ADL with (S) for inc'd independence with self cares    *Participate in 10m UE therex to increase overall stamina/activity tolerance for purposeful tasks    *Bed mobility- Max (A) for inc'd independence to manage own comfort and initiate EOB & OOB purposeful tasks    *Patient will verbalize 3 safety awareness/ principles to prevent falls in the home setting  *Patient will verbalize and demonstrate use of energy conservation/deep breathing techniques and work simplification skills during functional activities with no verbal cues  *Patient will increase OOB/sitting tolerance to 2-4 hours per day to increase participation in self-care and leisure tasks with no s/s of exertion  *Pt will demonstrate use of long handled AE during 100% of tx sessions for increased ADL safety and independence following D/C     *Patient will improve functional activity tolerance to 10 minutes of sustained functional tasks to increase participation in basic self-care and decrease assistance level         Silvia Delgado, OTR/L

## 2022-07-20 NOTE — ED NOTES
Pt refused recent medication, stated he would like to sleep and not take off his biPap, pt educated regarding importance of medication, will continue to encourage        Gabby Lau, FREDIS  07/20/22 3144

## 2022-07-20 NOTE — WOUND OSTOMY CARE
Progress Note - Wound   Ziyad Ayers 47 y o  male MRN: [id number]  Unit/Bed#: ED OVR 17 Encounter: [id number]      Assessment: This is a 47year old male patient admitted on 7/19/22 s/p small bowel obstruction  He has a history of small bowel obstruction, morbid obesity, DM, HTN, COPD and psychiatric discorder  He was seen today for wound care follow-up and was awake, alert & oriented x 3  He is incontinent of urine and stool and is dependent upon nursing staff for all of his care  The patient is max assist with turning and repositioning and refused to turn for examination of sacrobuttock area  He states that he has no open areas to sacrobuttocks  During wound care visit, the patient had a Kreg bed delivered  After visit completed, the patient was to be transferred to bariatric bed  Assessment Findings:  1-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  Wound manager is intact with small area of leakage from right upper edge of pouch  As per [employer], there are no ostomy supplies such as Pj paste or rings in ED supply room  This wound care RN suggested that she call for either a new wound manager pouch or for Pj paste or rings to repair small area of leakage  2-Surgical wound to abdomen has pink granulation tissue with green, thick drainage noted  Xeroform was not applied in single layer and excessive moisture had accumulated  Wound cleansed, dried & Xeroform dressing applied in single layer then cover with ABD pads & tape  3-Graft donor site to left medial thigh has pink granulation tissue with green, thick drainage noted  Xeroform was not applied in single layer and excessive moisture had accumulated  Wound cleansed, dried & Xeroform dressing applied in single layer then covered with ABD pads & tape  4-I&D site to posterior neck had thick green drainage   Wound cleansed & wound packed lightly with calcium alginate dressing with silver, then dry dressing with tape due to no foam dressings available in ED  Skin care plans:  1-Hydraguard to bilateral sacrum, buttock and heels BID and PRN  2-Elevate heels to offload pressure  3-Ehob cushion in chair when out of bed  4-Moisturize skin daily with skin nourishing cream   5-Turn/reposition q2h or when medically stable for pressure re-distribution on skin     6-Irrigate wound to posterior neck with normal saline, apply silver alginate, cover with Allevyn foam  Change every other day and PRN soilage/displacement  7-Cleanse abdominal wound with NS and pat dry  Pl ace xeroform over wound bed in a single layer cut to size of wound and cover with ABD  Secure with Tape  Change Daily and PRN soilage or displacement  8-Cleanse left medial thigh wound with NS and pat dry  Place xeroform over wound bed in a single layer cut to size of wound and cover with ABD  Secure with Tape  Change Daily and PRN soilage or displacement  Abdominal surgical wound measures 26 x 21 x 0 4 cm with pink granulation tissue and brown, dry eschar  There was a large amount of yellow/greenish drainage  Wound 03/16/22 Thigh Anterior;Left;Proximal (Active)   Wound Image   07/20/22 1150   Wound Description Pink;Granulation tissue; Yellow;Slough 07/20/22 1150   Morena-wound Assessment Dry;Scaly 07/20/22 1150   Wound Length (cm) 23 cm 07/20/22 1150   Wound Width (cm) 20 cm 07/20/22 1150   Wound Depth (cm) 0 1 cm 07/20/22 1150   Wound Surface Area (cm^2) 460 cm^2 07/20/22 1150   Wound Volume (cm^3) 46 cm^3 07/20/22 1150   Calculated Wound Volume (cm^3) 46 cm^3 07/20/22 1150   Drainage Amount Large 07/20/22 1150   Drainage Description Green;Purulent 07/20/22 1150   Non-staged Wound Description Full thickness 07/20/22 1150   Treatments Cleansed 07/20/22 1150   Dressing Xeroform;ABD 07/20/22 1150   Wound packed? No 07/20/22 1150   Dressing Changed Changed 07/20/22 1150   Dressing Status Clean;Dry; Intact 07/20/22 1150       Wound 06/15/22 Neck Posterior (Active)   Wound Image   07/20/22 1213   Wound Description Pink;Granulation tissue 07/20/22 1213   Morena-wound Assessment Intact 07/20/22 1213   Wound Length (cm) 0 5 cm 07/20/22 1213   Wound Width (cm) 1 cm 07/20/22 1213   Wound Depth (cm) 0 3 cm 07/20/22 1213   Wound Surface Area (cm^2) 0 5 cm^2 07/20/22 1213   Wound Volume (cm^3) 0 15 cm^3 07/20/22 1213   Calculated Wound Volume (cm^3) 0 15 cm^3 07/20/22 1213   Change in Wound Size % 75 07/20/22 1213   Wound Site Closure Sutures 07/18/22 1213   Drainage Amount Moderate 07/20/22 1213   Drainage Description Green;Purulent 07/20/22 1213   Non-staged Wound Description Full thickness 07/20/22 1213   Treatments Cleansed 07/20/22 1213   Dressing Calcium Alginate with Silver;Dry dressing (foam drsg unavailable in ED) 07/20/22 1213   Wound packed? Yes  07/20/22 1213   Dressing Changed New 07/20/22 1213   Dressing Status Clean;Dry; Intact 07/20/22 1213         Discussed assessment findings, and plan of care/recommendations with Patti MCNEAL  Wound care will follow along with patient throughout admission, please call or tiger text with questions and concerns  Recommendations written as orders    Arian Kang RN, BSN, [facility]

## 2022-07-20 NOTE — QUICK NOTE
Wound care:    Patient has an enterocutaneous fistula with wound manager (ostomy-like bag sealed for collecting fistula contents)  Midline abdominal wound is packed with Kerlix dressings  Left thigh donor site covered with dressing  Per [facility] surgery wound recommendations upon discharge include:   Daily to abdomen, lateral to wound manager  Adaptic on skin and dry kerlix and then an abd over top  LLE donor site, Xeroform to stay on, as it dries you can trim the pieces that lift up  There is a puncture wound immediately inferior to wound manager and that is repacked loosely every day  Wound manager gets changed when it leaks    Continue the above wound care

## 2022-07-20 NOTE — ASSESSMENT & PLAN NOTE
Wt Readings from Last 3 Encounters:   07/19/22 (!) 228 kg (503 lb)   07/19/22 (!) 229 kg (503 lb 15 5 oz)   06/14/22 (!) 238 kg (525 lb)     · Does not appear to be in an exacerbation  · Fluid status difficult to assess given morbid obesity, weight appears stable  · HOLD diuretics with soft Bps  · Daily weights, I&Os  · Monitor for fluid overload while diuretics are on hold

## 2022-07-20 NOTE — CASE MANAGEMENT
Case Management ED Assessment    Patient name Sergio Villafana  Location ED OVR 17/ED OVR 17 MRN [id number]  : 1970 Date 2022        OBJECTIVE:  Predictive Model Details         97% Factor Value    Risk of Hospital Admission or ED Visit Model Number of ED Visits 3     Number of Hospitalizations 5+     Has Medicaid Yes     Is in Relationship No     Has Atrial Fibrillation Yes     Has COPD Yes     Has Anemia Yes     Has CVD Yes     Has Depression Yes     Has Diabetes Yes     Has CHF Yes     Has PCP Yes            Chief Complaint: S/p small bowel obstruction   Patient Class: Emergency  Preferred Pharmacy:   [address] [place] [address], [address] [place] [address]  [address] [facility] [address]  Phone: [phone] Fax: [phone]    Primary Care Provider: Erin Ureña MD    Primary Insurance: [id number]  Secondary Insurance:     ED ASSESSMENT:    CM made aware patient to be admitted to [facility] at this time, transfer to [facility] cancelled  TT sent to Pastor Gaytan requesting information related to DCP from [facility] to [address] [place] [address]  CM aware patient was to return to facility needing wound care and possible TPN  CM waiting for information at this time  Return referral opened to [address] via Gio Calderón  CM will continue to follow at this time  Patient will need an insurance authorization to return to facility due to patient no longer being an [facility] [place] due to being out of facility for more than 15 days

## 2022-07-20 NOTE — ED NOTES
[facility] person for bed help: [facility] [phone]     Tali1 JANY Ocampo RN  07/20/22 2492

## 2022-07-21 LAB
ALBUMIN SERPL BCP-MCNC: 2.5 G/DL (ref 3.5–5)
ALP SERPL-CCNC: 122 U/L (ref 34–104)
ALT SERPL W P-5'-P-CCNC: 12 U/L (ref 7–52)
ANION GAP SERPL CALCULATED.3IONS-SCNC: 7 MMOL/L (ref 4–13)
AST SERPL W P-5'-P-CCNC: 20 U/L (ref 13–39)
BILIRUB DIRECT SERPL-MCNC: 0.14 MG/DL (ref 0–0.2)
BILIRUB SERPL-MCNC: 0.64 MG/DL (ref 0.2–1)
BUN SERPL-MCNC: 7 MG/DL (ref 5–25)
CALCIUM SERPL-MCNC: 8.6 MG/DL (ref 8.4–10.2)
CHLORIDE SERPL-SCNC: 96 MMOL/L (ref 96–108)
CO2 SERPL-SCNC: 31 MMOL/L (ref 21–32)
CREAT SERPL-MCNC: 0.56 MG/DL (ref 0.6–1.3)
ERYTHROCYTE [DISTWIDTH] IN BLOOD BY AUTOMATED COUNT: 17.6 % (ref 11.6–15.1)
GFR SERPL CREATININE-BSD FRML MDRD: 119 ML/MIN/1.73SQ M
GLUCOSE SERPL-MCNC: 117 MG/DL (ref 65–140)
HCT VFR BLD AUTO: 26.9 % (ref 36.5–49.3)
HGB BLD-MCNC: 7.7 G/DL (ref 12–17)
MCH RBC QN AUTO: 27.9 PG (ref 26.8–34.3)
MCHC RBC AUTO-ENTMCNC: 28.6 G/DL (ref 31.4–37.4)
MCV RBC AUTO: 98 FL (ref 82–98)
PLATELET # BLD AUTO: 169 THOUSANDS/UL (ref 149–390)
PMV BLD AUTO: 9.6 FL (ref 8.9–12.7)
POTASSIUM SERPL-SCNC: 4.4 MMOL/L (ref 3.5–5.3)
PROT SERPL-MCNC: 6.7 G/DL (ref 6.4–8.4)
RBC # BLD AUTO: 2.76 MILLION/UL (ref 3.88–5.62)
SODIUM SERPL-SCNC: 134 MMOL/L (ref 135–147)
WBC # BLD AUTO: 4.64 THOUSAND/UL (ref 4.31–10.16)

## 2022-07-21 PROCEDURE — 80076 HEPATIC FUNCTION PANEL: CPT | Performed by: INTERNAL MEDICINE

## 2022-07-21 PROCEDURE — 94640 AIRWAY INHALATION TREATMENT: CPT

## 2022-07-21 PROCEDURE — 94760 N-INVAS EAR/PLS OXIMETRY 1: CPT

## 2022-07-21 PROCEDURE — 36415 COLL VENOUS BLD VENIPUNCTURE: CPT | Performed by: INTERNAL MEDICINE

## 2022-07-21 PROCEDURE — 94660 CPAP INITIATION&MGMT: CPT

## 2022-07-21 PROCEDURE — 99225 PR SBSQ OBSERVATION CARE/DAY 25 MINUTES: CPT | Performed by: INTERNAL MEDICINE

## 2022-07-21 PROCEDURE — 80048 BASIC METABOLIC PNL TOTAL CA: CPT | Performed by: INTERNAL MEDICINE

## 2022-07-21 PROCEDURE — 99024 POSTOP FOLLOW-UP VISIT: CPT | Performed by: SURGERY

## 2022-07-21 PROCEDURE — 85027 COMPLETE CBC AUTOMATED: CPT | Performed by: INTERNAL MEDICINE

## 2022-07-21 RX ADMIN — DICYCLOMINE HYDROCHLORIDE 20 MG: 20 TABLET ORAL at 10:19

## 2022-07-21 RX ADMIN — METHOCARBAMOL TABLETS 750 MG: 500 TABLET, COATED ORAL at 08:20

## 2022-07-21 RX ADMIN — IPRATROPIUM BROMIDE AND ALBUTEROL SULFATE 3 ML: 2.5; .5 SOLUTION RESPIRATORY (INHALATION) at 21:41

## 2022-07-21 RX ADMIN — NYSTATIN: 100000 CREAM TOPICAL at 08:21

## 2022-07-21 RX ADMIN — DICYCLOMINE HYDROCHLORIDE 20 MG: 20 TABLET ORAL at 17:37

## 2022-07-21 RX ADMIN — ACETAMINOPHEN 975 MG: 325 TABLET, FILM COATED ORAL at 21:30

## 2022-07-21 RX ADMIN — DABIGATRAN ETEXILATE MESYLATE 150 MG: 150 CAPSULE ORAL at 08:16

## 2022-07-21 RX ADMIN — OXYCODONE HYDROCHLORIDE 5 MG: 5 TABLET ORAL at 08:30

## 2022-07-21 RX ADMIN — LEVOTHYROXINE SODIUM 25 MCG: 25 TABLET ORAL at 06:29

## 2022-07-21 RX ADMIN — OXYCODONE HYDROCHLORIDE 5 MG: 5 TABLET ORAL at 14:56

## 2022-07-21 RX ADMIN — DICYCLOMINE HYDROCHLORIDE 20 MG: 20 TABLET ORAL at 21:32

## 2022-07-21 RX ADMIN — FAMOTIDINE 20 MG: 20 TABLET ORAL at 08:20

## 2022-07-21 RX ADMIN — LORATADINE 10 MG: 10 TABLET ORAL at 08:20

## 2022-07-21 RX ADMIN — BUDESONIDE AND FORMOTEROL FUMARATE DIHYDRATE 2 PUFF: 160; 4.5 AEROSOL RESPIRATORY (INHALATION) at 08:21

## 2022-07-21 RX ADMIN — Medication 2 G: at 17:37

## 2022-07-21 RX ADMIN — IPRATROPIUM BROMIDE AND ALBUTEROL SULFATE 3 ML: 2.5; .5 SOLUTION RESPIRATORY (INHALATION) at 08:20

## 2022-07-21 RX ADMIN — DOCUSATE SODIUM 100 MG: 100 CAPSULE, LIQUID FILLED ORAL at 08:17

## 2022-07-21 RX ADMIN — NYSTATIN: 100000 CREAM TOPICAL at 18:45

## 2022-07-21 RX ADMIN — GABAPENTIN 300 MG: 300 CAPSULE ORAL at 17:37

## 2022-07-21 RX ADMIN — OXYCODONE HYDROCHLORIDE 2.5 MG: 5 TABLET ORAL at 06:28

## 2022-07-21 RX ADMIN — Medication 2 G: at 14:56

## 2022-07-21 RX ADMIN — BUDESONIDE AND FORMOTEROL FUMARATE DIHYDRATE 2 PUFF: 160; 4.5 AEROSOL RESPIRATORY (INHALATION) at 18:06

## 2022-07-21 RX ADMIN — OXYCODONE HYDROCHLORIDE 5 MG: 5 TABLET ORAL at 21:41

## 2022-07-21 RX ADMIN — DIGOXIN 250 MCG: 125 TABLET ORAL at 08:18

## 2022-07-21 RX ADMIN — GABAPENTIN 300 MG: 300 CAPSULE ORAL at 21:33

## 2022-07-21 RX ADMIN — SENNOSIDES 17.2 MG: 8.6 TABLET, FILM COATED ORAL at 21:31

## 2022-07-21 RX ADMIN — ACETAMINOPHEN 975 MG: 325 TABLET, FILM COATED ORAL at 06:28

## 2022-07-21 RX ADMIN — IPRATROPIUM BROMIDE AND ALBUTEROL SULFATE 3 ML: 2.5; .5 SOLUTION RESPIRATORY (INHALATION) at 17:17

## 2022-07-21 RX ADMIN — DABIGATRAN ETEXILATE MESYLATE 150 MG: 150 CAPSULE ORAL at 21:33

## 2022-07-21 RX ADMIN — METHOCARBAMOL TABLETS 750 MG: 500 TABLET, COATED ORAL at 14:58

## 2022-07-21 RX ADMIN — GABAPENTIN 300 MG: 300 CAPSULE ORAL at 08:18

## 2022-07-21 RX ADMIN — BUPROPION 450 MG: 150 TABLET, EXTENDED RELEASE ORAL at 08:17

## 2022-07-21 RX ADMIN — QUETIAPINE FUMARATE 50 MG: 25 TABLET ORAL at 21:31

## 2022-07-21 RX ADMIN — Medication 2 G: at 08:21

## 2022-07-21 RX ADMIN — ACETAMINOPHEN 975 MG: 325 TABLET, FILM COATED ORAL at 14:58

## 2022-07-21 RX ADMIN — METHOCARBAMOL TABLETS 750 MG: 500 TABLET, COATED ORAL at 21:32

## 2022-07-21 NOTE — ED NOTES
Pt took medications without any issue, currently resting in bed  Pt asked if he needed to be wiped or use the urinal which he denied at this time  Pt stated he will ring when he is ready to use the bathroom  Call bell within reach and side rails up        Shakeel Chinchilla RN  07/20/22 212

## 2022-07-21 NOTE — QUICK NOTE
Went to see patient and evaluate wound and reassess wound manager  Well received, patient doing well  Surgical Sera FRANCOIS, a med/surg nurse and ER staff very welcoming and helpful  Left donor site was slightly soupy so abd's removed and fresh xeroform applied  Instructed staff and patient that this is to dry, not be removed and as it dries up we trim the dried part off  They all acknowledged understanding  As for the wound manager we used two bars of stoma paste to seal the small opening and stop it from leaking, tolerated well  Finslly the skin grafted abdomen also a slittle soupy was cleaned and dried  Adaptc placed over the grafted area  Patient tolerated well, staff acknowledged understanding of his care  To be discharged to facility tomorrow

## 2022-07-21 NOTE — ED NOTES
2nd Cathoflo after 120 minutes still unsuccessful, TT provider [facility] and made aware        Minal Macario, RN  07/21/22 2746

## 2022-07-21 NOTE — NURSING NOTE
Per Rina Hardwick Trauma CRNP:  For donor site (left upper thigh): keep xeroform dressing on  Do not remove it  Trim excess as the xeroform dries out  There is currently an ABD on the donor site, the abd can get removed 7/22-22  For abdominal site:  Non-adherent oil emulsion dressing is on wound itself  On top of that goes abdominal dressings and then taped for securement  This dressing gets changed daily  For wound management system: This only gets changed as needed  Ostomy paste and putty can be placed on edges of the system if leaking occurs to prolong the life of the wound management system

## 2022-07-21 NOTE — PROGRESS NOTES
Bambi 46  Progress Note - [facility] 1970, 47 y o  male MRN: [id number]  Unit/Bed#: ED OVR 17 Encounter: [id number]  Primary Care Provider: Collins Lesch, MD   Date and time admitted to hospital: 7/19/2022  4:28 PM    Chronic diastolic heart failure [facility])  Assessment & Plan  Wt Readings from Last 3 Encounters:   07/19/22 (!) 228 kg (503 lb)   07/19/22 (!) 229 kg (503 lb 15 5 oz)   06/14/22 (!) 238 kg (525 lb)     · Does not appear to be in an exacerbation  · Fluid status difficult to assess given morbid obesity, weight appears stable  · HOLD diuretics with soft Bps  · Daily weights, I&Os  · Monitor for fluid overload while diuretics are on hold    Hypertension  Assessment & Plan  · BP low in ED  · Monitor renal function  · HOLD Spironolactone and Torsemide until BP improves  · Monitor for fluid overload    Hypotension  Assessment & Plan  Patient denies of any dizziness or lightheadedness  Blood pressure was checked twice with the bigger cuff  Will also check a manual blood pressure  Patient is on metoprolol which will be held  Patient also received pain medication with Dilaudid and oxycodone  Patient is asymptomatic  History of DVT (deep vein thrombosis)  Assessment & Plan  · Pradaxa BID    COPD (chronic obstructive pulmonary disease) (HCC)  Assessment & Plan  · Does not appear to be in an acute exacerbation  · Chronically on 2-4 L baseline  · Continue home breathing treatments  · CPAP HS    Atrial fibrillation (HCC)  Assessment & Plan  · HR currently controlled  · Continue Pradaxa, Digoxin, Metoprolol    Morbid obesity (HCC)  Assessment & Plan  Body mass index is 70 15 kg/m²      · Encourage diet and weight modification  · Hx of gastric bypass  · Lives at SNF, requires assistance with ADLs    * S/p small bowel obstruction  Assessment & Plan  · Admitted 6/14-7/19 for SBO S/P multiple surgeries at [facility]  · Continue wound care for abdomen and LLE donor site  · Right abdominal drain in place  · Wound consulted  · Last reported BM 7/19  · Continue bowel regimen, encourage PO intake  · PRN pain medication  · Rest per surgical team            Subjective/Objective     Subjective:   Patient is feeling better however has pain at the surgical site  Overnight events were reviewed  Blood pressure improved  Denies of any chest pain or shortness of breath  Patient is afebrile  Objective:  Vitals: Blood pressure 117/73, pulse 99, temperature (!) 97 4 °F (36 3 °C), temperature source Oral, resp  rate 22, weight (!) 228 kg (503 lb), SpO2 97 %  ,Body mass index is 70 15 kg/m²  Intake/Output Summary (Last 24 hours) at 7/21/2022 0810  Last data filed at 7/21/2022 0601  Gross per 24 hour   Intake --   Output 300 ml   Net -300 ml       Invasive Devices  Report    Peripherally Inserted Central Catheter Line  Duration           PICC Line 57/14/61 Right Basilic 37 days          Drain  Duration           Open Drain Medial RUQ 6 days                Physical Exam:   HEENT-PERRLA, moist oral mucosa  Neck-supple, no JVD elevation   Respiratory-equal air entry bilaterally, no rales or rhonchi  Cardiovascular system-S1, S2 heard, no murmur or gallops or rubs  Abdomen-soft, nontender, no guarding or rigidity, bowel sounds heard, abdominal bandaged, draining insitu, morbidly obese,  Extremities-no pedal edema  Peripheral pulses palpable  Musculoskeletal-no contractures, left thigh skin graft area clean and healing   Central nervous system-no acute focal neurological deficit ,no sensory or motor deficit noted  Skin-no rash noted        Lab, Imaging and other studies: I have personally reviewed pertinent reports      VTE Pharmacologic Prophylaxis: Reason for no pharmacologic prophylaxis on pradaxa  VTE Mechanical Prophylaxis: sequential compression device

## 2022-07-21 NOTE — QUICK NOTE
ABD removed from skin graft donor site  Xeroform gauze left in place  This is not to be removed; allow open to air to dry and trim edges as needed       Davina Campbell PA-C  7/21/2022

## 2022-07-21 NOTE — ED CARE HANDOFF
Emergency Department Sign Out Note        Signout and transfer of care from my colleague  See Separate Emergency Department note  The patient, Arianna Samayoa, was evaluated due to facility problem  Labs Reviewed   BASIC METABOLIC PANEL - Abnormal       Result Value Ref Range Status    Sodium 136  135 - 147 mmol/L Final    Potassium 4 2  3 5 - 5 3 mmol/L Final    Chloride 97  96 - 108 mmol/L Final    CO2 35 (*) 21 - 32 mmol/L Final    ANION GAP 4  4 - 13 mmol/L Final    BUN 6  5 - 25 mg/dL Final    Creatinine 0 50 (*) 0 60 - 1 30 mg/dL Final    Comment: Standardized to IDMS reference method    Glucose 93  65 - 140 mg/dL Final    Comment: If the patient is fasting, the ADA then defines impaired fasting glucose as > 100 mg/dL and diabetes as > or equal to 123 mg/dL  Specimen collection should occur prior to Sulfasalazine administration due to the potential for falsely depressed results  Specimen collection should occur prior to Sulfapyridine administration due to the potential for falsely elevated results      Calcium 8 5  8 4 - 10 2 mg/dL Final    eGFR 125  ml/min/1 73sq m Final    Narrative:     [place] guidelines for Chronic Kidney Disease (CKD):     Stage 1 with normal or high GFR (GFR > 90 mL/min/1 73 square meters)    Stage 2 Mild CKD (GFR = 60-89 mL/min/1 73 square meters)    Stage 3A Moderate CKD (GFR = 45-59 mL/min/1 73 square meters)    Stage 3B Moderate CKD (GFR = 30-44 mL/min/1 73 square meters)    Stage 4 Severe CKD (GFR = 15-29 mL/min/1 73 square meters)    Stage 5 End Stage CKD (GFR <15 mL/min/1 73 square meters)  Note: GFR calculation is accurate only with a steady state creatinine   CBC AND PLATELET - Abnormal    WBC 3 87 (*) 4 31 - 10 16 Thousand/uL Final    RBC 2 63 (*) 3 88 - 5 62 Million/uL Final    Hemoglobin 7 5 (*) 12 0 - 17 0 g/dL Final    Hematocrit 25 0 (*) 36 5 - 49 3 % Final    MCV 95  82 - 98 fL Final    MCH 28 5  26 8 - 34 3 pg Final    MCHC 30 0 (*) 31 4 - 37 4 g/dL Final    RDW 17 4 (*) 11 6 - 15 1 % Final    Platelets 745 (*) 001 - 390 Thousands/uL Final    MPV 9 6  8 9 - 12 7 fL Final   HEPATIC FUNCTION PANEL - Abnormal    Total Bilirubin 0 64  0 20 - 1 00 mg/dL Final    Bilirubin, Direct 0 14  0 00 - 0 20 mg/dL Final    Alkaline Phosphatase 122 (*) 34 - 104 U/L Final    AST 20  13 - 39 U/L Final    Comment: Specimen collection should occur prior to Sulfasalazine administration due to the potential for falsely depressed results  ALT 12  7 - 52 U/L Final    Comment: Specimen collection should occur prior to Sulfasalazine administration due to the potential for falsely depressed results  Total Protein 6 7  6 4 - 8 4 g/dL Final    Albumin 2 5 (*) 3 5 - 5 0 g/dL Final   CBC AND PLATELET - Abnormal    WBC 4 64  4 31 - 10 16 Thousand/uL Final    RBC 2 76 (*) 3 88 - 5 62 Million/uL Final    Hemoglobin 7 7 (*) 12 0 - 17 0 g/dL Final    Hematocrit 26 9 (*) 36 5 - 49 3 % Final    MCV 98  82 - 98 fL Final    MCH 27 9  26 8 - 34 3 pg Final    MCHC 28 6 (*) 31 4 - 37 4 g/dL Final    RDW 17 6 (*) 11 6 - 15 1 % Final    Platelets 469  929 - 390 Thousands/uL Final    MPV 9 6  8 9 - 12 7 fL Final   BASIC METABOLIC PANEL - Abnormal    Sodium 134 (*) 135 - 147 mmol/L Final    Potassium 4 4  3 5 - 5 3 mmol/L Final    Chloride 96  96 - 108 mmol/L Final    CO2 31  21 - 32 mmol/L Final    ANION GAP 7  4 - 13 mmol/L Final    BUN 7  5 - 25 mg/dL Final    Creatinine 0 56 (*) 0 60 - 1 30 mg/dL Final    Comment: Standardized to IDMS reference method    Glucose 117  65 - 140 mg/dL Final    Comment: If the patient is fasting, the ADA then defines impaired fasting glucose as > 100 mg/dL and diabetes as > or equal to 123 mg/dL  Specimen collection should occur prior to Sulfasalazine administration due to the potential for falsely depressed results  Specimen collection should occur prior to Sulfapyridine administration due to the potential for falsely elevated results      Calcium 8 6  8 4 - 10 2 mg/dL Final    eGFR 119  ml/min/1 73sq m Final    Narrative:     [place] guidelines for Chronic Kidney Disease (CKD):     Stage 1 with normal or high GFR (GFR > 90 mL/min/1 73 square meters)    Stage 2 Mild CKD (GFR = 60-89 mL/min/1 73 square meters)    Stage 3A Moderate CKD (GFR = 45-59 mL/min/1 73 square meters)    Stage 3B Moderate CKD (GFR = 30-44 mL/min/1 73 square meters)    Stage 4 Severe CKD (GFR = 15-29 mL/min/1 73 square meters)    Stage 5 End Stage CKD (GFR <15 mL/min/1 73 square meters)  Note: GFR calculation is accurate only with a steady state creatinine   POCT GLUCOSE - Normal    POC Glucose 112  65 - 140 mg/dl Final    Comment: CRITICAL VALUE NOTED REPEAT         Patient is to be discharged to facility in the AM   No acute events during shift  Patient is to be signed out to my colleague at change of shift, with plan for discharge to facility                               Procedures  MDM        Disposition  Final diagnoses:   Post-op pain   Morbid obesity ([facility] [address] )   Obesity hypoventilation syndrome ([facility] [address] )     Time reflects when diagnosis was documented in both MDM as applicable and the Disposition within this note     Time User Action Codes Description Comment    7/19/2022  6:54 PM Glory VELÁZQUEZ Add [G89 18] Post-op pain     7/19/2022  7:25 PM Ashlie Ansari Add [E66 01] Morbid obesity ([facility] [address] )     7/19/2022  7:25 PM Ashlie Ansari Add [E66 2] Obesity hypoventilation syndrome ([facility] [address] )     7/20/2022  7:46 AM Rahat Moe Add [Z87 19] S/p small bowel obstruction       ED Disposition     ED Disposition   Transfer to Another Facility-In Network    Condition   --    Date/Time   Tue Jul 19, 2022  6:54 PM    Comment   Cici Benavides should be transferred [facility] to [facility] depending on bed           MD Documentation    [address] Name, [address] [place], [place]    (Name & Tel number) [facility]   Transported by (Company and Unit #) [facility]      RN Documentation    Flowsheet Row Most [address], [address] [place], [place]    (Name & Tel number) [facility]   Transport [facility]ulance   Transported by [employer] and Unit #) [facility]   Level of Care Basic life support      Follow-up Information    None       Patient's Medications   Discharge Prescriptions    No medications on file     No discharge procedures on file         ED Provider  Electronically Signed by     Jose Luis Cheney MD  07/21/22 Piyush Lamb MD  07/22/22 0419

## 2022-07-21 NOTE — CASE MANAGEMENT
Case Management ED Discharge Planning Note    Patient name Marilu Jordan  Location ED OVR 17/ED OVR 17 MRN [id number]  : 1970 Date 2022        OBJECTIVE:  Predictive Model Details         97% Factor Value    Risk of Hospital Admission or ED Visit Model Number of ED Visits 3     Number of Hospitalizations 5+     Has Medicaid Yes     Is in Relationship No     Has Atrial Fibrillation Yes     Has COPD Yes     Has Anemia Yes     Has CVD Yes     Has Depression Yes     Has Diabetes Yes     Has CHF Yes     Has PCP Yes            Chief Complaint: S/p small bowel obstruction   Patient Class: Emergency  Preferred Pharmacy:   [address] [place] [address] [facility] [address]  Phone: [phone] Fax: [phone]    Primary Care Provider: Mery Michael MD    Primary Insurance: [address]  Secondary Insurance:     ED Discharge Details:    Discharge planning discussed with[de-identified] Patient  Freedom of Choice: Yes  Comments - Freedom of Choice: Choice is to return to [address] ([facility]) at discharge  CM contacted family/caregiver?: No- see comments (REFUSING)  Were Treatment Team discharge recommendations reviewed with patient/caregiver?: Yes  Did patient/caregiver verbalize understanding of patient care needs?: N/A- going to facility  Were patient/caregiver advised of the risks associated with not following Treatment Team discharge recommendations?: Yes    Requested  [facility]         Is the patient interested in [facility] at discharge?: No    DME Referral Provided  Referral made for DME?: No    Other Referral/Resources/Interventions Provided:  Interventions: Facility Return, SNF  Referral Comments: CM s/w [address] regarding patient  [address] is aware that one weeks worth of wound care supplies was obtained to send with patient - some of this supplies was obtained via  from [facility]      Would you like to participate in our [address] service program?  : No - Declined    Treatment Team Recommendation: Facility Return, SNF  Discharge Destination Plan[de-identified] Facility Return, SNF ([address])     Transport at Discharge : [facility]  Dispatcher Contacted: Yes  Number/Name of Dispatcher: [address] by [address] and Unit #): [facility]  ETA of Transport (Date): 07/22/22  ETA of Transport (Time): 1100     Additional Comments:   CM met with Dexter Patterson in [facility] main lobby to provide one weeks worth of wound care supplies  Ivon Rodriguez supplied with paper copy of wound care instructions and copy of AVS/DCI from patient's discharge from \[facility]\""  Ivon Rodriguez and [place] confirmed facility has ordered additional supplies for patient's return and able to accept patient tomorrow morning   Transport confirmed with [facility] for 1100 AM pick-up tomorrow morning (7/22/2022) with [facility]        07/21/22 [address] Name, [address] [place], [place]   Receiving Facility/Agency Phone Number [phone]   Facility/Agency Fax Number [phone] [facility] Number [place] OBTAINED BY [facility]  [address]   Acute Care Transfer- Nurse Certification    [facility]   Trans[facility] by Marta and Unit #) [facility]   Level of Care S   Transport Mode Ambulance     RITA Haile, [place], ACM-SW  07/21/22 1:57 PM

## 2022-07-21 NOTE — ED NOTES
Assumed care at this time  Pt sleeping at this time  Pt is no distress        Camilo Mcbride RN  07/21/22 2329

## 2022-07-21 NOTE — CONSULTS
Consultation - General Surgery  Audra Romano 47 y o  male MRN: [id number]  Unit/Bed#: ED OVR 16 Encounter: [id number]    Reason for Consult: S/P SBO, post-op pain      Assessment/Plan:  Post-operative status  S/p multiple surgeries    6/14/22: laparotomy  6/15/22: laparotomy exploratory w/repair of serosal injury, VAC change  6/17/22: abdominal washout, repair of serosal tears and bridging vicryl mesh, partial closure w/application of wound VAC  Discharged to [address] from [facility] on 7/19 with wound ostomy in place, dressings  [facility] sent pt back to ED due to miscommunication (facility was unaware that pt was coming and refused to accept)  Pt c/o pain at wound sites however stable and unchanged  Wound ostomy low output, unchanged, 100cc/day  AFVSS  No new labs today; yesterday's labs unremarkable    No acute surgical needs  Continue wound care as outlined in discharge orders from [facility]  Pt to return to [facility], has been accepted per CM note 7/20/22      HPI:    Audra Romano is a 47 y o  male with COPD with chronic O2 needs 4L NC, OHS, obesity, H/o DVT, Afib AC with pradaxa, H/o CHF, venous insufficiency, h/o ventral hernia with abd surgical intervention in the distant past, HTN who presents to ED after being discharged from [facility] to [address]  Apparently there was a miscommunication and [facility] was unaware that pt was coming back to their facility and was therefore unable to accept him at that time  Pt is s/p multiple surgeries in the past month (see A/P above), however is stable with controlled post-operative pain  Review of Systems:  Review of Systems   Constitutional: Positive for chills  Negative for fever  Respiratory: Negative for shortness of breath  Cardiovascular: Negative for chest pain  Gastrointestinal: Positive for abdominal pain (wound site)  Negative for constipation, diarrhea, nausea and vomiting  Genitourinary: Negative for difficulty urinating and dysuria     Skin: Positive for wound (L thigh skin graft donor site painful)  Neurological: Positive for tremors (since prior surgeries)          Historical Information   Past Medical History:   Diagnosis Date    [facility]     Anemia     Anxiety     Cancer ([place] [address] )     Cardiac disease     Cellulitis     COPD (chronic obstructive pulmonary disease) ([place] [address] )     Depression     Diabetes mellitus ([place] [address] )     DVT (deep venous thrombosis) (HCC)     GERD (gastroesophageal reflux disease)     HBP (high blood pressure)     Heart failure (HCC)     Hx of blood clots     Hypertension     Hypokalemia     Hypothyroid     Obesity     Psychiatric disorder      Past Surgical History:   Procedure Laterality Date    ABDOMINAL HERNIA REPAIR  2019    CHOLECYSTECTOMY      Lap    GASTRECTOMY SLEEVE LAPAROSCOPIC  2018    INCISION AND DRAINAGE OF WOUND Bilateral 2021    Procedure: INCISION AND DRAINAGE (I&D) HEAD/FACE;  Surgeon: Rina Hare MD;  Location:  MAIN OR;  Service: General    IR PICC PLACEMENT DOUBLE LUMEN  2022    IVC FILTER INSERTION  2018    KNEE SURGERY Right     open wound, injury     LAPAROTOMY N/A 2022    Procedure: LAPAROTOMY EXPLORATORY, EXTENSIVE LYSIS OF ADHESIONS, APPLICATION OF ABTHERA WOUND VAC;  Surgeon: Daniela Andrews MD;  Location: [address] [place] [address];  Service: General    LAPAROTOMY N/A 6/15/2022    Procedure: LAPAROTOMY EXPLORATORY WITH REPAIR OF SEROSAL INJURY;  Surgeon: Savannah Solorzano MD;  Location:  MAIN OR;  Service: General    LEG SURGERY Right 2009    SPLIT THICKNESS SKIN GRAFT N/A 2022    Procedure: incision and drainage, wash out vac change;  Surgeon: Suzanne Parrish DO;  Location:  MAIN OR;  Service: General    SPLIT THICKNESS SKIN GRAFT N/A 2022    Procedure: PREPERATION RECIPIENT STSG SITE, DEBRIDEMENT NONVIABLE SKIN EDGE; PREPERATION RECIPIENT SKIN GRAFT SITE; APPLICATION OF SKIN SUBSTITUTE TO DONOR SITE; APPLICATION WOUND VAC X2 GREATER THAN 50CM; Surgeon: Ayleen Frazier DO;  [facility];  Service: General    US GUIDED VASCULAR ACCESS  08/06/2018    VAC DRESSING APPLICATION N/A 7/13/4242    Procedure: CHANGE DRESSING/VAC ABDOMEN;  Surgeon: Nabila Quiñones MD;  [facility];  Service: General    VAC DRESSING APPLICATION N/A 0/62/5187    Procedure: ABDOMINAL WASHOUT, REPAIR OF SEROSAL TEARS,BRIDING VICRYL MESH, PARTIAL CLOSURE, APPLICATION OF WOUND VAC;  Surgeon: Domingo Merlos DO;  [facility];  Service: General    VAC DRESSING APPLICATION N/A 1/91/1275    Procedure: CHANGE DRESSING/VAC ABDOMEN;  Surgeon: Domingo Merlos DO;  [facility];  Service: General     Social History   Social History     Substance and Sexual Activity   Alcohol Use Not Currently     Social History     Substance and Sexual Activity   Drug Use No     Social History     Tobacco Use   Smoking Status Former Smoker    Packs/day: 1 00    Years: 15 00    Pack years: 15 00   Smokeless Tobacco Never Used   Tobacco Comment    1 5ppd x 23 years, quit 2014     Family History   Problem Relation Age of Onset    Lung cancer Father     Diabetes Maternal Aunt     Heart attack Mother     Clotting disorder Mother     Hypertension Mother     Heart failure Mother     Diabetes Mother     Atrial fibrillation Mother     Sleep apnea Mother     Obesity Mother     Asthma Sister     Stroke Neg Hx     Anuerysm Neg Hx     Arrhythmia Neg Hx     Hyperlipidemia Neg Hx         pt unsure    Fainting Neg Hx        Medications:  Current Facility-Administered Medications   Medication Dose Route Frequency    acetaminophen (TYLENOL) tablet 975 mg  975 mg Oral Q8H [facility]    budesonide-formoterol (SYMBICORT) 160-4 5 mcg/act inhaler 2 puff  2 puff Inhalation BID    buPROPion (WELLBUTRIN XL) 24 hr tablet 450 mg  450 mg Oral Daily    dabigatran etexilate (PRADAXA) capsule 150 mg  150 mg Oral Q12H [facility]    dicyclomine (BENTYL) tablet 20 mg  20 mg Oral 4x Daily (AC & HS)    digoxin (LANOXIN) tablet 250 mcg  250 mcg Oral Daily    docusate sodium (COLACE) capsule 100 mg  100 mg Oral BID    famotidine (PEPCID) tablet 20 mg  20 mg Oral Daily    gabapentin (NEURONTIN) capsule 300 mg  300 mg Oral TID    ipratropium-albuterol (DUO-NEB) 0 5-2 5 mg/3 mL inhalation solution 3 mL  3 mL Nebulization TID    levothyroxine tablet 25 mcg  25 mcg Oral Early Morning    loratadine (CLARITIN) tablet 10 mg  10 mg Oral Daily    methocarbamol (ROBAXIN) tablet 750 mg  750 mg Oral Q6H [address]    naloxone (NARCAN) 0 04 mg/mL syringe 0 04 mg  0 04 mg Intravenous Q1MIN PRN    nystatin (MYCOSTATIN) cream   Topical BID    ondansetron (ZOFRAN) injection 4 mg  4 mg Intravenous Q4H PRN    oxyCODONE (ROXICODONE) IR tablet 2 5 mg  2 5 mg Oral Q4H PRN    oxyCODONE (ROXICODONE) IR tablet 5 mg  5 mg Oral Q4H PRN    polyethylene glycol (MIRALAX) packet 17 g  17 g Oral Daily PRN    QUEtiapine (SEROquel) tablet 50 mg  50 mg Oral HS    senna (SENOKOT) tablet 17 2 mg  2 tablet Oral HS    sodium chloride tablet 2 g  2 g Oral TID With Meals       Allergies   Allergen Reactions    Penicillins Hives       Physical Examination:  Vitals:   Vitals:    07/21/22 0425 07/21/22 0631 07/21/22 0818 07/21/22 0830   BP:  117/73  (!) 129/111   BP Location:    Left arm   Pulse: 76 99 81 88   Resp:  22  20   Temp:       TempSrc:       SpO2: 100% 97%  99%   Weight:         Temp  Min: 96 2 °F (35 7 °C)  Max: 100 8 °F (38 2 °C)       Physical Exam  Vitals and nursing note reviewed  Constitutional:       Appearance: He is morbidly obese  HENT:      Head: Normocephalic and atraumatic  Eyes:      Extraocular Movements: Extraocular movements intact  Conjunctiva/sclera: Conjunctivae normal    Cardiovascular:      Rate and Rhythm: Normal rate and regular rhythm  Heart sounds: Normal heart sounds  Pulmonary:      Effort: Pulmonary effort is normal  No respiratory distress        Comments: Breath sounds diminished 2/2 body habitus  Abdominal:      General: Bowel sounds are normal  There is no distension  Palpations: Abdomen is soft  Tenderness: There is abdominal tenderness (surrounding abdominal wound)  There is no guarding  Comments: Abdominal wound with wound ostomy in place on R side, no leaks  L side with ABD in place  Musculoskeletal:      Cervical back: Normal range of motion  Skin:     General: Skin is warm and dry  Comments: L thigh skin graft donor site with dressing in place   Neurological:      Mental Status: He is alert and oriented to person, place, and time  Psychiatric:         Mood and Affect: Mood normal          Behavior: Behavior normal          Thought Content: Thought content normal           Diagnostic Data:  Lab: I have personally reviewed pertinent lab results  Results from last 7 days   Lab Units 07/21/22  0835   WBC Thousand/uL 4 64   HEMOGLOBIN g/dL 7 7*   HEMATOCRIT % 26 9*   PLATELETS Thousands/uL 169     Results from last 7 days   Lab Units 07/21/22  0835   POTASSIUM mmol/L 4 4   CHLORIDE mmol/L 96   CO2 mmol/L 31   BUN mg/dL 7   CREATININE mg/dL 0 56*   CALCIUM mg/dL 8 6   ALK PHOS U/L 122*   ALT U/L 12   AST U/L 20       Imaging: I have personally reviewed the pertinent imaging studies on the PACS system  Procedure: XR chest portable    Result Date: 7/21/2022  Narrative: CHEST INDICATION:   assess PICC  COMPARISON:  7/3/2022 EXAM PERFORMED/VIEWS:  XR CHEST PORTABLE FINDINGS:  PICC line tip overlies the SVC  Cardiomegaly is present  Mild pulmonary vascular congestion is noted  No pneumothorax or pleural effusion  Osseous structures appear within normal limits for patient age  Impression: PICC line as described  Workstation performed: [id number]       Active medications:   The patients active medications were reviewed and modified as appropriate  VTE Prophylaxis: Sequential compression device (Venodyne)  and Reason for no pharmacologic prophylaxis on pradaxa      Code Status: Prior    Júnior Corley PA-C  7/21/2022

## 2022-07-21 NOTE — ED NOTES
As per Dr Pina Pendleotn, pt is to be discharged back to SNF ([address]) tomorrow morning     Travis Bautista RN  07/21/22 9272

## 2022-07-21 NOTE — PROGRESS NOTES
Progress Note - General Surgery   Dobbins Ra 47 y o  male MRN: [id number]  Unit/Bed#: ED OVR 17 Encounter: [id number]    Assessment:  44-year-old morbidly obese male with past medical history pertinent for COPD, diabetes mellitus, history of blood clots, AFib, cardiac disease Status post exploratory laparotomy, lysis of adhesions, repair of enterotomy, Vicryl bridging mesh, management of enterocutaneous fistula, significant wound VAC care with granulating base and skin graft application - midline wound dramatically improved and granulated with proximally 50% skin graft intact, base of wound at midline from ex lap and Vicryl bridging mesh is relatively dry and has healthy red edges with granulation tissue, EC fistula is low output less than 100 cc in the last 24 hours and is out putting liquid succus and bilious type secretions, EC fistula present at the right lateral portion of midline wound, donor site for skin graft is intact with Xeroform over top, site is oozing serous fluid minimally, tenderness overlying both midline and left lower extremity donor site for skin graft, patient is hemodynamically stable and labs were within normal limits other than acute on chronic anemia    Plan:  1)   - change midline dressing with Xeroform overlying skin graft and granulated tissue where Vicryl bridging mesh was  - continue to change wound ostomy care pouch as needed and cut flange to size to fit well molded around enteric cutaneous fistula  - if drainage continues to undermine wound ostomy care pouch, can try to reinforce with pectin/stoma powder, Covidien rings, Coloplast strip paste  - leave Xeroform on left lower extremity, only need to change ABD pads as needed for drainage  - p r n  analgesia  - regular diet as tolerated  - no plans for surgery  [facility] be contacted if needed for wound care adjustments  - discussion and coordination with [facility] and medicine staff at [facility]  - discussion and coordination with chief resident on numerous accounts overhead [facility]  - coordination and discussion with the emergency department provider  - after thorough discussion it was determined that patient would likely continue to try and be placed at [facility] of [place] [address] home, this is the patient's preference if they will accept him from the emergency department; however, if patient is having difficulty with placement at this particularly with nursing home patient when a bed is available will be transitioned over to the [address] [place] [address] per the conversation with chief resident under Dr Natividad Paredes service     Subjective/Objective   Chief Complaint:  I just feel like I am being put in the middle    Subjective:  Patient was seen examined at bedside  Patient denies any acute events overnight  Patient is well-known to the practice and has had extensive surgical intervention due to small-bowel obstruction historically  Patient had exploratory laparotomy with removal of hernia sac and extensive lysis of adhesions  Patient had no abdominal wall domain and as a result was left with an open abdomen  Due to morbid obesity and substantial previous surgical history patient was transferred to [facility] for advanced surgical closure techniques  Patient had extensive surgical care regarding ABThera wound VAC management and Vicryl bridging mesh  Patient had advanced wound care techniques with wound VAC and skin graft  Patient since then has been doing much better  Patient was discharged from [facility] in order to be station at his previous nursing home where he was a resident  Patient was brought to [facility] at [facility] but subsequently was discharged back to [place] emergency department  Patient was disappointed and upset as he feels like he has been put in the middle between the hospital and the nursing facility    Patient is finding it hard to understand why he could not be at the nursing home  Patient denies any fevers or chills  Patient denies any abdominal pain  Patient denies any nausea vomiting  Patient is passing gas and bowel movements  Patient is urinating routinely  Patient denies any distention  Patient does not have a robust return of his appetite but is tolerating small meals  Patient does have an enterocutaneous fistula which is being managed by his ostomy pouch and he reports that it is been low output  Patient does have some discomfort around the area where his skin graft was taken/donor site and also some discomfort and tenderness around where his split-thickness skin graft was transplanted  Patient at this point in time is most concerned with being positioned back at a nursing home that he is comfortable at as well as also having some p r n  analgesia for the discomfort he has when his thigh and midline abdominal wound  Patient denies any other significant review of systems or symptoms  Objective:     Blood pressure (!) 129/111, pulse 88, temperature (!) 97 4 °F (36 3 °C), temperature source Oral, resp  rate 20, weight (!) 228 kg (503 lb), SpO2 99 %  ,Body mass index is 70 15 kg/m²        Intake/Output Summary (Last 24 hours) at 7/21/2022 2452  Last data filed at 7/21/2022 0601  Gross per 24 hour   Intake --   Output 300 ml   Net -300 ml       Invasive Devices  Report    Peripherally Inserted Central Catheter Line  Duration           PICC Line 13/12/94 Right Basilic 37 days          Drain  Duration           Open Drain Medial RUQ 6 days                Physical Exam: General appearance: alert and oriented, in no acute distress  Head: Normocephalic, without obvious abnormality, atraumatic  Lungs: diminished breath sounds  Heart: irregularly irregular rhythm  Abdomen: soft, hypoactive BS, no distension, mild tenderness overlying skin graft and bridging mesh, EC fistula present  Extremities: large thigh with small amount of weeping fluid donor site present and healing, mild tenderness, sensation motor in tact per baseline but reduced severely due to weight/adipose  Skin:  Open midline wound with Vicryl bridging mesh in significant amount of granulation tissue, 50% covered in skin graft that seems to be healing appropriately, healthy bleeding base, no appearance of devitalized tissue or breakdown of midline wound, donor site from left lower extremity appears to have granulation tissue in his slightly tender to palpation, Xeroform overlying that area in place adherent to the skin/dermal base, enteric cutaneous fistula is small and could not be visualized but is emptying appropriately into wound ostomy pouch, there is an element of velvety red region that seems to be the area of output    Right Thigh    Abdominal Wound            Wound Pouch and EC fistula      Lab, Imaging and other studies:  I have personally reviewed pertinent lab results    , CBC:   Lab Results   Component Value Date    WBC 4 64 07/21/2022    HGB 7 7 (L) 07/21/2022    HCT 26 9 (L) 07/21/2022    MCV 98 07/21/2022     07/21/2022    MCH 27 9 07/21/2022    MCHC 28 6 (L) 07/21/2022    RDW 17 6 (H) 07/21/2022    MPV 9 6 07/21/2022   , CMP:   Lab Results   Component Value Date    SODIUM 134 (L) 07/21/2022    K 4 4 07/21/2022    CL 96 07/21/2022    CO2 31 07/21/2022    BUN 7 07/21/2022    CREATININE 0 56 (L) 07/21/2022    CALCIUM 8 6 07/21/2022    AST 20 07/21/2022    ALT 12 07/21/2022    ALKPHOS 122 (H) 07/21/2022    EGFR 119 07/21/2022     VTE Pharmacologic Prophylaxis: Pradaxa  VTE Mechanical Prophylaxis: sequential compression device

## 2022-07-21 NOTE — ED CARE HANDOFF
Emergency Department Sign Out Note        Sign out and transfer of care from Dr Ana Charles  See Separate Emergency Department note  The patient, Graciela Kuhn, was evaluated by the previous provider for case management and placement       Workup Completed:  Labs, medicine consultation, case management consultation    ED Course / Workup Pending (followup): No acute issues overnight with patient  Case Management states that he may be sent over to the long-term care facility today  They will be evaluating his case today as well  Patient has not required any acute medical interventions other than his scheduled medications and routine care  Case endorsed to Dr Shey Boone past shift change pending disposition  Procedures  MDM        Disposition  Final diagnoses:   Post-op pain   Morbid obesity ([facility] )   Obesity hypoventilation syndrome ([facility] )     Time reflects when diagnosis was documented in both MDM as applicable and the Disposition within this note     Time User Action Codes Description Comment    7/19/2022  6:54 PM Haylie Nazario [G89 18] Post-op pain     7/19/2022  7:25 PM Adam Ansari [E66 01] Morbid obesity ([facility] )     7/19/2022  7:25 PM Adam Ansari Add [E66 2] Obesity hypoventilation syndrome ([facility] )     7/20/2022  7:46 AM Layla Nickerson Add [Z87 19] S/p small bowel obstruction       ED Disposition     ED Disposition   Transfer to Another Facility-In Network    Condition   --    Date/Time   Tue Jul 19, 2022  6:54 PM    Comment   Graciela Kuhn should be transferred [facility] to [facility] depending on bed           Follow-up Information    None       Patient's Medications   Discharge Prescriptions    No medications on file     No discharge procedures on file         ED Provider  Electronically Signed by     Yudi Blackmon MD  07/21/22 1039

## 2022-07-21 NOTE — ED NOTES
Lunch tray provided to patient  No concerns or complaints at this time        Claiborne Vick Palomares  07/21/22 5394

## 2022-07-21 NOTE — ED NOTES
Spoke with Med surg CC, stated that transportation back to facility was arranged for tomorrow morning at 0930  Transportation was arranged by  Corby Reis RN  07/21/22 7820

## 2022-07-21 NOTE — ED NOTES
Pt complaining of nausea at this time however is refusing PRN Zofran which was ordered     Tyrone Carmona RN  07/21/22 1500

## 2022-07-21 NOTE — ED NOTES
Cathflo after 30 min dwell time unsuccessful, will try after 90 minutes as stated in administration instructions        Minal Macario RN  07/21/22 8664

## 2022-07-22 VITALS
HEART RATE: 87 BPM | BODY MASS INDEX: 70.15 KG/M2 | TEMPERATURE: 97.5 F | DIASTOLIC BLOOD PRESSURE: 60 MMHG | OXYGEN SATURATION: 100 % | SYSTOLIC BLOOD PRESSURE: 127 MMHG | RESPIRATION RATE: 22 BRPM | WEIGHT: 315 LBS

## 2022-07-22 PROCEDURE — 94660 CPAP INITIATION&MGMT: CPT

## 2022-07-22 PROCEDURE — 94760 N-INVAS EAR/PLS OXIMETRY 1: CPT

## 2022-07-22 PROCEDURE — 94640 AIRWAY INHALATION TREATMENT: CPT

## 2022-07-22 PROCEDURE — 99214 OFFICE O/P EST MOD 30 MIN: CPT | Performed by: INTERNAL MEDICINE

## 2022-07-22 RX ORDER — WATER 1000 ML/1000ML
INJECTION, SOLUTION INTRAVENOUS
Status: DISCONTINUED
Start: 2022-07-22 | End: 2022-07-22 | Stop reason: HOSPADM

## 2022-07-22 RX ADMIN — ACETAMINOPHEN 975 MG: 325 TABLET, FILM COATED ORAL at 05:29

## 2022-07-22 RX ADMIN — NYSTATIN: 100000 CREAM TOPICAL at 09:35

## 2022-07-22 RX ADMIN — LEVOTHYROXINE SODIUM 25 MCG: 25 TABLET ORAL at 05:29

## 2022-07-22 RX ADMIN — FAMOTIDINE 20 MG: 20 TABLET ORAL at 09:27

## 2022-07-22 RX ADMIN — DICYCLOMINE HYDROCHLORIDE 20 MG: 20 TABLET ORAL at 09:26

## 2022-07-22 RX ADMIN — DABIGATRAN ETEXILATE MESYLATE 150 MG: 150 CAPSULE ORAL at 09:33

## 2022-07-22 RX ADMIN — BUPROPION 450 MG: 150 TABLET, EXTENDED RELEASE ORAL at 09:27

## 2022-07-22 RX ADMIN — DIGOXIN 250 MCG: 125 TABLET ORAL at 09:23

## 2022-07-22 RX ADMIN — IPRATROPIUM BROMIDE AND ALBUTEROL SULFATE 3 ML: 2.5; .5 SOLUTION RESPIRATORY (INHALATION) at 09:30

## 2022-07-22 RX ADMIN — ALTEPLASE 2 MG: 2.2 INJECTION, POWDER, LYOPHILIZED, FOR SOLUTION INTRAVENOUS at 07:56

## 2022-07-22 RX ADMIN — Medication 2 G: at 09:33

## 2022-07-22 RX ADMIN — LORATADINE 10 MG: 10 TABLET ORAL at 09:27

## 2022-07-22 RX ADMIN — METHOCARBAMOL TABLETS 750 MG: 500 TABLET, COATED ORAL at 09:27

## 2022-07-22 RX ADMIN — BUDESONIDE AND FORMOTEROL FUMARATE DIHYDRATE 2 PUFF: 160; 4.5 AEROSOL RESPIRATORY (INHALATION) at 09:30

## 2022-07-22 RX ADMIN — GABAPENTIN 300 MG: 300 CAPSULE ORAL at 09:27

## 2022-07-22 RX ADMIN — OXYCODONE HYDROCHLORIDE 2.5 MG: 5 TABLET ORAL at 05:31

## 2022-07-22 NOTE — PROGRESS NOTES
[facility]  Progress Note - Calista Mayers 1970, 47 y o  male MRN: [id number]  Unit/Bed#: ED OVR 17 Encounter: [id number]  Primary Care Provider: Coby Kuhn MD   Date and time admitted to hospital: 7/19/2022  4:28 PM    Chronic diastolic heart failure [facility])  Assessment & Plan  Wt Readings from Last 3 Encounters:   07/19/22 (!) 228 kg (503 lb)   07/19/22 (!) 229 kg (503 lb 15 5 oz)   06/14/22 (!) 238 kg (525 lb)     · Does not appear to be in an exacerbation  · Fluid status difficult to assess given morbid obesity, weight appears stable  · HOLD diuretics with soft Bps  · Daily weights, I&Os  · Monitor for fluid overload while diuretics are on hold    Hypertension  Assessment & Plan  · BP low in ED  · Monitor renal function  · HOLD Spironolactone and Torsemide until BP improves  · Monitor for fluid overload    Hypotension  Assessment & Plan  Patient denies of any dizziness or lightheadedness  Blood pressure was checked twice with the bigger cuff  Will also check a manual blood pressure  Patient is on metoprolol which will be held  Patient also received pain medication with Dilaudid and oxycodone  Patient is asymptomatic  History of DVT (deep vein thrombosis)  Assessment & Plan  · Pradaxa BID    COPD (chronic obstructive pulmonary disease) (HCC)  Assessment & Plan  · Does not appear to be in an acute exacerbation  · Chronically on 2-4 L baseline  · Continue home breathing treatments  · CPAP HS    Atrial fibrillation (HCC)  Assessment & Plan  · HR currently controlled  · Continue Pradaxa, Digoxin, Metoprolol    Morbid obesity (HCC)  Assessment & Plan  Body mass index is 70 15 kg/m²      · Encourage diet and weight modification  · Hx of gastric bypass  · Lives at SNF, requires assistance with ADLs    * S/p small bowel obstruction  Assessment & Plan  · Admitted 6/14-7/19 for SBO S/P multiple surgeries at [facility]  · Continue wound care for abdomen and LLE donor site  · Right abdominal drain in place  · Wound consulted  · Last reported BM 7/19  · Continue bowel regimen, encourage PO intake  · PRN pain medication  · Rest per surgical team            Subjective/Objective     Subjective:   Patient has pain in the abdominal site but is improved  Tolerating p o  Diet  No nausea or vomiting noted  Surgical wound was sealed with 2 bars of stoma paste at the small opening and to stop the leaking and he tolerated well  Patient    Objective:  Vitals: Blood pressure 127/60, pulse 87, temperature 97 5 °F (36 4 °C), temperature source Oral, resp  rate 22, weight (!) 228 kg (503 lb), SpO2 100 %  ,Body mass index is 70 15 kg/m²  No intake or output data in the 24 hours ending 07/22/22 1639    Invasive Devices  Report    Drain  Duration           Open Drain Medial RUQ 7 days                Physical Exam:   HEENT-PERRLA, moist oral mucosa  Neck-supple, no JVD elevation   Respiratory-equal air entry bilaterally, no rales or rhonchi  Cardiovascular system-S1, S2 heard, no murmur or gallops or rubs  Abdomen-soft, nontender, no guarding or rigidity, bowel sounds heard, abdominal wound is bandaged, morbidly obese  Extremities-no pedal edema  Peripheral pulses palpable  Musculoskeletal-no contractures, left thigh wound is bandaged  Central nervous system-no acute focal neurological deficit ,no sensory or motor deficit noted  Skin-no rash noted        Lab, Imaging and other studies: I have personally reviewed pertinent reports      VTE Pharmacologic Prophylaxis: Reason for no pharmacologic prophylaxis Patient is on Pradaxa  VTE Mechanical Prophylaxis: sequential compression device

## 2022-07-22 NOTE — ED NOTES
Cathflo not successful for blood return  Provider aware  Plans to d/c PICC at this time       Aurea Blanco RN  07/22/22 3729

## 2022-07-22 NOTE — ED NOTES
[facility] called and aware of impending pt arrival   Spoke with Jody Minaya, FREDIS  07/22/22 3193

## 2022-07-22 NOTE — ED NOTES
Assumed care of pt at this time  Pt currently sleeping in hospital bed with BiPap on, no distress noted  Side rails up x2 and call bell is within reach for pt use        Diane Taylor RN  07/21/22 5011

## 2022-07-22 NOTE — ASSESSMENT & PLAN NOTE
1200 s/w Jessica Wilburn  re: possibly setting up home health for patient per daughters request. Axel Rea RN   · Does not appear to be in an acute exacerbation  · Chronically on 2-4 L baseline  · Continue home breathing treatments  · CPAP HS

## 2022-07-22 NOTE — ASSESSMENT & PLAN NOTE
· Admitted 6/14-7/19 for SBO S/P multiple surgeries at hospitals  · Continue wound care for abdomen and LLE donor site  · Right abdominal drain in place  · Wound consulted  · Last reported BM 7/19  · Continue bowel regimen, encourage PO intake  · PRN pain medication  · Rest per surgical team

## 2022-07-23 ENCOUNTER — HOSPITAL ENCOUNTER (EMERGENCY)
Facility: HOSPITAL | Age: 52
Discharge: HOME | End: 2022-07-24
Attending: EMERGENCY MEDICINE
Payer: COMMERCIAL

## 2022-07-23 ENCOUNTER — APPOINTMENT (EMERGENCY)
Dept: RADIOLOGY | Facility: HOSPITAL | Age: 52
End: 2022-07-23
Payer: COMMERCIAL

## 2022-07-23 DIAGNOSIS — R06.02 SHORTNESS OF BREATH: Primary | ICD-10-CM

## 2022-07-23 DIAGNOSIS — R10.9 ABDOMINAL PAIN: ICD-10-CM

## 2022-07-23 LAB
ALBUMIN SERPL BCP-MCNC: 2.5 G/DL (ref 3.5–5)
ALP SERPL-CCNC: 124 U/L (ref 34–104)
ALT SERPL W P-5'-P-CCNC: 11 U/L (ref 7–52)
ANION GAP SERPL CALCULATED.3IONS-SCNC: 4 MMOL/L (ref 4–13)
AST SERPL W P-5'-P-CCNC: 15 U/L (ref 13–39)
BASOPHILS # BLD AUTO: 0.02 THOUSANDS/ΜL (ref 0–0.1)
BASOPHILS NFR BLD AUTO: 0 % (ref 0–1)
BILIRUB DIRECT SERPL-MCNC: 0.29 MG/DL (ref 0–0.2)
BILIRUB SERPL-MCNC: 0.71 MG/DL (ref 0.2–1)
BUN SERPL-MCNC: 8 MG/DL (ref 5–25)
CALCIUM SERPL-MCNC: 8.5 MG/DL (ref 8.4–10.2)
CHLORIDE SERPL-SCNC: 91 MMOL/L (ref 96–108)
CO2 SERPL-SCNC: 40 MMOL/L (ref 21–32)
CREAT SERPL-MCNC: 0.65 MG/DL (ref 0.6–1.3)
EOSINOPHIL # BLD AUTO: 0.08 THOUSAND/ΜL (ref 0–0.61)
EOSINOPHIL NFR BLD AUTO: 1 % (ref 0–6)
ERYTHROCYTE [DISTWIDTH] IN BLOOD BY AUTOMATED COUNT: 17.4 % (ref 11.6–15.1)
FLUAV RNA RESP QL NAA+PROBE: NEGATIVE
FLUBV RNA RESP QL NAA+PROBE: NEGATIVE
GFR SERPL CREATININE-BSD FRML MDRD: 112 ML/MIN/1.73SQ M
GLUCOSE SERPL-MCNC: 113 MG/DL (ref 65–140)
HCT VFR BLD AUTO: 29 % (ref 36.5–49.3)
HGB BLD-MCNC: 8.5 G/DL (ref 12–17)
IMM GRANULOCYTES # BLD AUTO: 0.03 THOUSAND/UL (ref 0–0.2)
IMM GRANULOCYTES NFR BLD AUTO: 1 % (ref 0–2)
LIPASE SERPL-CCNC: 16 U/L (ref 11–82)
LYMPHOCYTES # BLD AUTO: 1.97 THOUSANDS/ΜL (ref 0.6–4.47)
LYMPHOCYTES NFR BLD AUTO: 35 % (ref 14–44)
MCH RBC QN AUTO: 28.3 PG (ref 26.8–34.3)
MCHC RBC AUTO-ENTMCNC: 29.3 G/DL (ref 31.4–37.4)
MCV RBC AUTO: 97 FL (ref 82–98)
MONOCYTES # BLD AUTO: 0.55 THOUSAND/ΜL (ref 0.17–1.22)
MONOCYTES NFR BLD AUTO: 10 % (ref 4–12)
NEUTROPHILS # BLD AUTO: 3.02 THOUSANDS/ΜL (ref 1.85–7.62)
NEUTS SEG NFR BLD AUTO: 53 % (ref 43–75)
NRBC BLD AUTO-RTO: 1 /100 WBCS
PLATELET # BLD AUTO: 233 THOUSANDS/UL (ref 149–390)
PMV BLD AUTO: 9.5 FL (ref 8.9–12.7)
POTASSIUM SERPL-SCNC: 3.7 MMOL/L (ref 3.5–5.3)
PROT SERPL-MCNC: 6.6 G/DL (ref 6.4–8.4)
RBC # BLD AUTO: 3 MILLION/UL (ref 3.88–5.62)
RSV RNA RESP QL NAA+PROBE: NEGATIVE
SARS-COV-2 RNA RESP QL NAA+PROBE: NEGATIVE
SODIUM SERPL-SCNC: 135 MMOL/L (ref 135–147)
WBC # BLD AUTO: 5.67 THOUSAND/UL (ref 4.31–10.16)

## 2022-07-23 PROCEDURE — 80076 HEPATIC FUNCTION PANEL: CPT | Performed by: EMERGENCY MEDICINE

## 2022-07-23 PROCEDURE — 80048 BASIC METABOLIC PNL TOTAL CA: CPT | Performed by: EMERGENCY MEDICINE

## 2022-07-23 PROCEDURE — 94640 AIRWAY INHALATION TREATMENT: CPT

## 2022-07-23 PROCEDURE — 71045 X-RAY EXAM CHEST 1 VIEW: CPT

## 2022-07-23 PROCEDURE — 96374 THER/PROPH/DIAG INJ IV PUSH: CPT

## 2022-07-23 PROCEDURE — 0241U HB NFCT DS VIR RESP RNA 4 TRGT: CPT | Performed by: EMERGENCY MEDICINE

## 2022-07-23 PROCEDURE — 83690 ASSAY OF LIPASE: CPT | Performed by: EMERGENCY MEDICINE

## 2022-07-23 PROCEDURE — 85025 COMPLETE CBC W/AUTO DIFF WBC: CPT | Performed by: EMERGENCY MEDICINE

## 2022-07-23 PROCEDURE — 36415 COLL VENOUS BLD VENIPUNCTURE: CPT | Performed by: EMERGENCY MEDICINE

## 2022-07-23 PROCEDURE — 99284 EMERGENCY DEPT VISIT MOD MDM: CPT

## 2022-07-23 PROCEDURE — 99285 EMERGENCY DEPT VISIT HI MDM: CPT | Performed by: EMERGENCY MEDICINE

## 2022-07-23 PROCEDURE — 93005 ELECTROCARDIOGRAM TRACING: CPT

## 2022-07-23 RX ORDER — QUETIAPINE FUMARATE 25 MG/1
50 TABLET, FILM COATED ORAL
Status: DISCONTINUED | OUTPATIENT
Start: 2022-07-23 | End: 2022-07-24 | Stop reason: HOSPADM

## 2022-07-23 RX ORDER — ONDANSETRON 2 MG/ML
4 INJECTION INTRAMUSCULAR; INTRAVENOUS ONCE
Status: COMPLETED | OUTPATIENT
Start: 2022-07-23 | End: 2022-07-23

## 2022-07-23 RX ORDER — DIGOXIN 125 MCG
250 TABLET ORAL DAILY
Status: DISCONTINUED | OUTPATIENT
Start: 2022-07-24 | End: 2022-07-24 | Stop reason: HOSPADM

## 2022-07-23 RX ORDER — METHOCARBAMOL 500 MG/1
750 TABLET, FILM COATED ORAL EVERY 6 HOURS PRN
Status: DISCONTINUED | OUTPATIENT
Start: 2022-07-23 | End: 2022-07-24 | Stop reason: HOSPADM

## 2022-07-23 RX ORDER — LEVOTHYROXINE SODIUM 0.03 MG/1
25 TABLET ORAL
Status: DISCONTINUED | OUTPATIENT
Start: 2022-07-24 | End: 2022-07-24 | Stop reason: HOSPADM

## 2022-07-23 RX ORDER — OXYCODONE HYDROCHLORIDE 5 MG/1
5 TABLET ORAL EVERY 4 HOURS PRN
Status: DISCONTINUED | OUTPATIENT
Start: 2022-07-23 | End: 2022-07-24 | Stop reason: HOSPADM

## 2022-07-23 RX ORDER — POTASSIUM CHLORIDE 20MEQ/15ML
20 LIQUID (ML) ORAL DAILY
Status: DISCONTINUED | OUTPATIENT
Start: 2022-07-24 | End: 2022-07-24 | Stop reason: HOSPADM

## 2022-07-23 RX ORDER — POLYETHYLENE GLYCOL 3350 17 G/17G
17 POWDER, FOR SOLUTION ORAL DAILY
Status: DISCONTINUED | OUTPATIENT
Start: 2022-07-24 | End: 2022-07-24 | Stop reason: HOSPADM

## 2022-07-23 RX ORDER — PANTOPRAZOLE SODIUM 40 MG/1
40 TABLET, DELAYED RELEASE ORAL
Status: DISCONTINUED | OUTPATIENT
Start: 2022-07-24 | End: 2022-07-24 | Stop reason: HOSPADM

## 2022-07-23 RX ORDER — DOCUSATE SODIUM 100 MG/1
100 CAPSULE, LIQUID FILLED ORAL 2 TIMES DAILY
Status: DISCONTINUED | OUTPATIENT
Start: 2022-07-23 | End: 2022-07-24 | Stop reason: HOSPADM

## 2022-07-23 RX ORDER — NYSTATIN 100000 U/G
CREAM TOPICAL 2 TIMES DAILY
Status: DISCONTINUED | OUTPATIENT
Start: 2022-07-23 | End: 2022-07-24 | Stop reason: HOSPADM

## 2022-07-23 RX ORDER — GABAPENTIN 300 MG/1
300 CAPSULE ORAL 3 TIMES DAILY
Status: DISCONTINUED | OUTPATIENT
Start: 2022-07-23 | End: 2022-07-24 | Stop reason: HOSPADM

## 2022-07-23 RX ORDER — SPIRONOLACTONE 25 MG/1
50 TABLET ORAL DAILY
Status: DISCONTINUED | OUTPATIENT
Start: 2022-07-24 | End: 2022-07-24 | Stop reason: HOSPADM

## 2022-07-23 RX ORDER — BUDESONIDE AND FORMOTEROL FUMARATE DIHYDRATE 160; 4.5 UG/1; UG/1
2 AEROSOL RESPIRATORY (INHALATION) 2 TIMES DAILY
Status: DISCONTINUED | OUTPATIENT
Start: 2022-07-23 | End: 2022-07-24 | Stop reason: HOSPADM

## 2022-07-23 RX ORDER — BUPROPION HYDROCHLORIDE 150 MG/1
450 TABLET ORAL DAILY
Status: DISCONTINUED | OUTPATIENT
Start: 2022-07-24 | End: 2022-07-24 | Stop reason: HOSPADM

## 2022-07-23 RX ORDER — DABIGATRAN ETEXILATE 150 MG/1
150 CAPSULE ORAL EVERY 12 HOURS SCHEDULED
Status: DISCONTINUED | OUTPATIENT
Start: 2022-07-23 | End: 2022-07-24 | Stop reason: HOSPADM

## 2022-07-23 RX ORDER — TORSEMIDE 10 MG/1
40 TABLET ORAL 2 TIMES DAILY
Status: DISCONTINUED | OUTPATIENT
Start: 2022-07-23 | End: 2022-07-24 | Stop reason: HOSPADM

## 2022-07-23 RX ORDER — IPRATROPIUM BROMIDE AND ALBUTEROL SULFATE 2.5; .5 MG/3ML; MG/3ML
3 SOLUTION RESPIRATORY (INHALATION) EVERY 8 HOURS
Status: DISCONTINUED | OUTPATIENT
Start: 2022-07-23 | End: 2022-07-24 | Stop reason: HOSPADM

## 2022-07-23 RX ORDER — ACETAMINOPHEN 325 MG/1
975 TABLET ORAL EVERY 6 HOURS PRN
Status: DISCONTINUED | OUTPATIENT
Start: 2022-07-23 | End: 2022-07-24 | Stop reason: HOSPADM

## 2022-07-23 RX ORDER — SODIUM CHLORIDE 1000 MG
2 TABLET, SOLUBLE MISCELLANEOUS
Status: DISCONTINUED | OUTPATIENT
Start: 2022-07-24 | End: 2022-07-24 | Stop reason: HOSPADM

## 2022-07-23 RX ADMIN — ONDANSETRON 4 MG: 2 INJECTION INTRAMUSCULAR; INTRAVENOUS at 20:55

## 2022-07-23 RX ADMIN — IPRATROPIUM BROMIDE AND ALBUTEROL SULFATE 3 ML: 2.5; .5 SOLUTION RESPIRATORY (INHALATION) at 22:29

## 2022-07-23 RX ADMIN — GABAPENTIN 300 MG: 300 CAPSULE ORAL at 21:58

## 2022-07-23 RX ADMIN — NYSTATIN: 100000 CREAM TOPICAL at 22:29

## 2022-07-23 RX ADMIN — QUETIAPINE FUMARATE 50 MG: 25 TABLET ORAL at 21:58

## 2022-07-23 RX ADMIN — DOCUSATE SODIUM 100 MG: 100 CAPSULE, LIQUID FILLED ORAL at 21:58

## 2022-07-23 RX ADMIN — ACETAMINOPHEN 975 MG: 325 TABLET, FILM COATED ORAL at 21:58

## 2022-07-24 VITALS
RESPIRATION RATE: 18 BRPM | WEIGHT: 315 LBS | HEART RATE: 64 BPM | DIASTOLIC BLOOD PRESSURE: 65 MMHG | BODY MASS INDEX: 44.1 KG/M2 | OXYGEN SATURATION: 100 % | SYSTOLIC BLOOD PRESSURE: 93 MMHG | HEIGHT: 71 IN | TEMPERATURE: 97.8 F

## 2022-07-24 RX ADMIN — PANTOPRAZOLE SODIUM 40 MG: 40 TABLET, DELAYED RELEASE ORAL at 05:48

## 2022-07-24 RX ADMIN — GABAPENTIN 300 MG: 300 CAPSULE ORAL at 08:13

## 2022-07-24 RX ADMIN — LEVOTHYROXINE SODIUM 25 MCG: 25 TABLET ORAL at 05:48

## 2022-07-24 RX ADMIN — POTASSIUM CHLORIDE 20 MEQ: 1.5 SOLUTION ORAL at 08:19

## 2022-07-24 RX ADMIN — METOPROLOL TARTRATE 25 MG: 25 TABLET, FILM COATED ORAL at 05:48

## 2022-07-24 RX ADMIN — BUDESONIDE AND FORMOTEROL FUMARATE DIHYDRATE 2 PUFF: 160; 4.5 AEROSOL RESPIRATORY (INHALATION) at 08:21

## 2022-07-24 RX ADMIN — IPRATROPIUM BROMIDE AND ALBUTEROL SULFATE 3 ML: 2.5; .5 SOLUTION RESPIRATORY (INHALATION) at 05:49

## 2022-07-24 RX ADMIN — NYSTATIN 1 APPLICATION: 100000 CREAM TOPICAL at 08:18

## 2022-07-24 RX ADMIN — BUPROPION HYDROCHLORIDE 450 MG: 150 TABLET, EXTENDED RELEASE ORAL at 08:12

## 2022-07-24 RX ADMIN — DIGOXIN 250 MCG: 125 TABLET ORAL at 08:13

## 2022-07-24 RX ADMIN — Medication 2 G: at 07:30

## 2022-07-24 RX ADMIN — DABIGATRAN ETEXILATE MESYLATE 150 MG: 150 CAPSULE ORAL at 08:18

## 2022-07-24 RX ADMIN — Medication 2 G: at 11:48

## 2022-07-24 RX ADMIN — DOCUSATE SODIUM 100 MG: 100 CAPSULE, LIQUID FILLED ORAL at 08:14

## 2022-07-24 NOTE — ED NOTES
Pt requested something to eat to at this time  Pt given snack at this time  MD rody Ceron, RN  07/23/22 8606

## 2022-07-24 NOTE — ED NOTES
[facility] to transport pt back to [facility] via ambulance between [id number] today        Elyssa Michael RN  07/24/22 7374

## 2022-07-24 NOTE — ED NOTES
[facility] called to inquire about pickup/transport time   No update available, search for transport continues     Jonny Coyne [place]  07/24/22 9416

## 2022-07-24 NOTE — DISCHARGE INSTRUCTIONS
Chest, EKG, x-ray and blood work showed no significant abnormalities  Follow-up with primary care provider soon as possible  Come back for new or worsening symptoms

## 2022-07-24 NOTE — ED PROVIDER NOTES
History  Chief Complaint   Patient presents with    Abdominal Pain     PT arrives via EMS with reports  of right upper quadrant abdominal pain, reports nausea, denies vomiting and sob  48 y/o male hx of Afib/ DVT on pradaxa, COPD on 2-4L oxygen presents the emergency department with shortness of breath  Present for multiple days  Also complains of chronic abdominal pain status post bowel obstruction  Pain is around the ostomy for EC fistula  Reports normal bowel movement this am   Passing flatus regularly  Reports decreased p o  Intake  Limited solids and liquids over the last 2 days  Notes nausea without emesis  Abdominal Pain  Pain location:  RUQ  Pain radiates to:  Does not radiate  Pain severity:  Mild  Onset quality:  Gradual  Timing:  Constant  Progression:  Worsening  Chronicity:  Chronic  Relieved by:  Nothing  Associated symptoms: shortness of breath        Prior to Admission Medications   Prescriptions Last Dose Informant Patient Reported? Taking? Cholecalciferol (VITAMIN D) 50 MCG (2000 UT) tablet  Outside Facility (Specify) Yes No   Sig: Take 2,000 Units by mouth daily   Potassium Chloride (KCL-20 PO)   Yes No   Sig: Take 20 mg by mouth in the morning   QUEtiapine (SEROquel) 50 mg tablet   No No   Sig: Take 1 tablet (50 mg total) by mouth daily at bedtime   acetaminophen (TYLENOL) 325 mg tablet   No No   Sig: Take 3 tablets (975 mg total) by mouth every 8 (eight) hours   aluminum-magnesium hydroxide-simethicone (MYLANTA) 200-200-20 mg/5 mL suspension   No No   Sig: Take 30 mL by mouth every 6 (six) hours as needed for indigestion or heartburn   ammonium lactate (LAC-HYDRIN) 12 % cream   No No   Sig: Apply topically 2 (two) times a day   buPROPion (FORFIVO XL) 450 MG 24 hr tablet   No No   Sig: Take 1 tablet (450 mg total) by mouth daily   budesonide-formoterol (SYMBICORT) 160-4 5 mcg/act inhaler   Yes No   Sig: Inhale 2 puffs 2 (two) times a day Rinse mouth after use  cyanocobalamin (VITAMIN B-12) 1000 MCG tablet  Outside Facility (Specify) Yes No   Sig: Take 1,000 mcg by mouth daily   dabigatran etexilate (PRADAXA) 150 mg capsu  Outside Facility (Specify) Yes No   Sig: Take 150 mg by mouth 2 (two) times a day   dicyclomine (BENTYL) 20 mg tablet   No No   Sig: Take 1 tablet (20 mg total) by mouth 4 (four) times a day (before meals and at bedtime)   digoxin (LANOXIN) 0 25 mg tablet   No No   Sig: Take 1 tablet (250 mcg total) by mouth daily   docusate sodium (COLACE) 100 mg capsule   No No   Sig: Take 1 capsule (100 mg total) by mouth 2 (two) times a day   famotidine (PEPCID) 20 mg tablet   No No   Sig: Take 1 tablet (20 mg total) by mouth daily   gabapentin (NEURONTIN) 250 mg/5 mL solution   No No   Sig: Take 6 mL (300 mg total) by mouth 3 (three) times a day   ipratropium-albuterol (DUO-NEB) 0 5-2 5 mg/3 mL nebulizer solution  Outside Facility (Specify) Yes No   Sig: Take 3 mL by nebulization 3 (three) times a day   levothyroxine 25 mcg tablet  Outside Facility (Specify) Yes No   Sig: Take 25 mcg by mouth daily   loratadine (CLARITIN) 10 mg tablet   Yes No   Sig: Take 10 mg by mouth daily   methocarbamol (ROBAXIN) 750 mg tablet   No No   Sig: Take 1 tablet (750 mg total) by mouth every 6 (six) hours   metoprolol tartrate (LOPRESSOR) 25 mg tablet   No No   Sig: Take 1 tablet (25 mg total) by mouth every 8 (eight) hours   nystatin (MYCOSTATIN) cream   No No   Sig: Apply topically 2 (two) times a day   Patient not taking: Reported on 6/6/2022   omeprazole (PriLOSEC) 20 mg delayed release capsule   Yes No   Sig: Take 40 mg by mouth daily   ondansetron (ZOFRAN) 4 mg tablet   No No   Sig: Take 1 tablet (4 mg total) by mouth every 8 (eight) hours as needed for nausea or vomiting   Patient not taking: Reported on 6/6/2022   oxyCODONE (ROXICODONE) 5 mg/5 mL solution   No No   Sig: Take 5 mL (5 mg total) by mouth every 4 (four) hours as needed for moderate pain for up to 10 days Max Daily Amount: 30 mg   polyethylene glycol (MIRALAX) 17 g packet   No No   Sig: Take 17 g by mouth daily   sodium chloride (OCEAN) 0 65 % nasal spray   No No   Si spray into each nostril as needed for congestion   sodium chloride 1 g tablet   No No   Sig: Take 2 tablets (2 g total) by mouth 3 (three) times a day   spironolactone (ALDACTONE) 50 mg tablet  Outside Facility (Specify) Yes No   Sig: Take 50 mg by mouth daily   torsemide 40 MG TABS   No No   Sig: Take 40 mg by mouth 2 (two) times a day      Facility-Administered Medications: None       Past Medical History:   Diagnosis Date    Afib ([place] [address] )     Anemia     Anxiety     Cancer ([place] [address] )     Cardiac disease     Cellulitis     COPD (chronic obstructive pulmonary disease) ([place] [address] )     Depression     Diabetes mellitus ([place] [address] )     [facility]     GERD (gastroesophageal reflux disease)     HBP (high blood pressure)     Heart failure (HCC)     Hx of blood clots     Hypertension     Hypokalemia     Hypothyroid     Obesity     Psychiatric disorder        Past Surgical History:   Procedure Laterality Date    ABDOMINAL HERNIA REPAIR  2019    CHOLECYSTECTOMY      Lap    GASTRECTOMY SLEEVE LAPAROSCOPIC  2018    INCISION AND DRAINAGE OF WOUND Bilateral 2021    Procedure: INCISION AND DRAINAGE (I&D) HEAD/FACE;  Surgeon: Johana Holder MD;  Location:  MAIN OR;  Service: General    IR PICC PLACEMENT DOUBLE LUMEN  2022    IVC FILTER INSERTION  2018    KNEE SURGERY Right     open wound, injury     LAPAROTOMY N/A 2022    Procedure: LAPAROTOMY EXPLORATORY, EXTENSIVE LYSIS OF ADHESIONS, APPLICATION OF ABTHERA WOUND VAC;  Surgeon: Carrington Donald MD;  Location: [facility] MAIN OR;  Service: General    LAPAROTOMY N/A 6/15/2022    Procedure: LAPAROTOMY EXPLORATORY WITH REPAIR OF SEROSAL INJURY;  Surgeon: Sarah Heredia MD;  Location:  MAIN OR;  Service: General    LEG SURGERY Right 2009    SPLIT THICKNESS SKIN GRAFT N/A 6/30/2022    Procedure: incision and drainage, wash out vac change;  Surgeon: Christiano Guerrero DO;  Location: [facility] MAIN [facility];  Service: General    SPLIT THICKNESS SKIN GRAFT N/A 7/5/2022    Procedure: PREPERATION RECIPIENT STSG SITE, DEBRIDEMENT NONVIABLE SKIN EDGE; PREPERATION RECIPIENT SKIN GRAFT SITE; APPLICATION OF SKIN SUBSTITUTE TO DONOR SITE; APPLICATION WOUND VAC X2 GREATER THAN 50CM; Surgeon: Juan Miguel Lugo DO;  Location: [facility] MAIN [facility];  Service: General    US GUIDED VASCULAR ACCESS  08/06/2018    VAC DRESSING APPLICATION N/A 4/69/5440    Procedure: CHANGE DRESSING/VAC ABDOMEN;  Surgeon: Deirdre Bowden MD;  [facility]: [facility];  Service: General    [address] [place] [address] N/A 5/65/4169    Procedure: ABDOMINAL WASHOUT, REPAIR OF SEROSAL TEARS,BRIDING VICRYL MESH, PARTIAL CLOSURE, APPLICATION OF WOUND VAC;  Surgeon: Christiano Guerrero DO;  [facility];  Service: General    VAC DRESSING APPLICATION N/A 3/16/6076    Procedure: CHANGE DRESSING/VAC ABDOMEN;  Surgeon: Christiano Guerrero DO;  [facility];  Service: General       Family History   Problem Relation Age of Onset    Lung cancer Father     Diabetes Maternal Aunt     Heart attack Mother     Clotting disorder Mother     Hypertension Mother     Heart failure Mother     Diabetes Mother     Atrial fibrillation Mother     Sleep apnea Mother     Obesity Mother     Asthma Sister     Stroke Neg Hx     Anuerysm Neg Hx     Arrhythmia Neg Hx     Hyperlipidemia Neg Hx         pt unsure    Fainting Neg Hx      I have reviewed and agree with the history as documented      E-Cigarette/Vaping    E-Cigarette Use Never User      E-Cigarette/Vaping Substances    Nicotine No     THC No     CBD No     Flavoring No     Other No     Unknown No      Social History     Tobacco Use    Smoking status: Former Smoker     Packs/day: 1 00     Years: 15 00     Pack years: 15 00    Smokeless tobacco: Never Used    Tobacco comment: 1 5ppd x 23 years, quit 2014   Vaping Use    Vaping Use: Never used   Substance Use Topics    Alcohol use: Not Currently    Drug use: No       Review of Systems   Respiratory: Positive for shortness of breath  Gastrointestinal: Positive for abdominal pain  All other systems reviewed and are negative  Physical Exam  Physical Exam  Vitals and nursing note reviewed  Constitutional:       General: He is not in acute distress  Appearance: He is well-developed  He is obese  He is not diaphoretic  HENT:      Head: Normocephalic and atraumatic  Right Ear: External ear normal       Left Ear: External ear normal    Eyes:      Conjunctiva/sclera: Conjunctivae normal    Neck:      Trachea: No tracheal deviation  Cardiovascular:      Rate and Rhythm: Normal rate and regular rhythm  Heart sounds: Normal heart sounds  No murmur heard  Pulmonary:      Effort: No respiratory distress  Breath sounds: Normal breath sounds  No stridor  No wheezing or rales  Abdominal:      General: Bowel sounds are normal  There is no distension  Palpations: Abdomen is soft  There is no mass  Tenderness: There is abdominal tenderness (Very mild tenderness only around the EC fistula  Not over the gallbladder  No rebound or guarding ) in the right upper quadrant  There is no guarding or rebound  Negative signs include Castro's sign and McBurney's sign  Comments: EC fistula with output     Musculoskeletal:         General: No tenderness or deformity  Skin:     General: Skin is dry  Findings: No rash  Comments: Healing wound on LLE s/p graft   Neurological:      Motor: No abnormal muscle tone  Coordination: Coordination normal    Psychiatric:         Behavior: Behavior normal          Thought Content:  Thought content normal          Judgment: Judgment normal          Vital Signs  ED Triage Vitals [07/23/22 2029]   Temperature Pulse Respirations Blood Pressure SpO2 97 8 °F (36 6 °C) 56 18 122/97 100 %      Temp src Heart Rate Source Patient Position - Orthostatic VS BP Location FiO2 (%)   -- Monitor Lying Right arm --      Pain Score       7           Vitals:    07/23/22 2029 07/23/22 2203 07/24/22 0049 07/24/22 0533   BP: 122/97 (!) 102/24 (!) 103/49 (!) 112/42   Pulse: 56 87 84 80   Patient Position - Orthostatic VS: Lying Sitting Lying Lying         Visual Acuity      ED Medications  Medications   torsemide (DEMADEX) tablet 40 mg (40 mg Oral Not Given 7/23/22 2204)   spironolactone (ALDACTONE) tablet 50 mg (has no administration in time range)   pantoprazole (PROTONIX) EC tablet 40 mg (40 mg Oral Given 7/24/22 0548)   metoprolol tartrate (LOPRESSOR) tablet 25 mg (25 mg Oral Given 7/24/22 0548)   QUEtiapine (SEROquel) tablet 50 mg (50 mg Oral Given 7/23/22 2158)   polyethylene glycol (MIRALAX) packet 17 g (has no administration in time range)   potassium chloride oral solution 20 mEq (has no administration in time range)   levothyroxine tablet 25 mcg (25 mcg Oral Given 7/24/22 0548)   ipratropium-albuterol (DUO-NEB) 0 5-2 5 mg/3 mL inhalation solution 3 mL (3 mL Nebulization Given 7/24/22 0549)   gabapentin (NEURONTIN) capsule 300 mg (300 mg Oral Given 7/23/22 2158)   docusate sodium (COLACE) capsule 100 mg (100 mg Oral Given 7/23/22 2158)   digoxin (LANOXIN) tablet 250 mcg (has no administration in time range)   dabigatran etexilate (PRADAXA) capsule 150 mg (150 mg Oral Not Given 7/23/22 2202)   buPROPion (WELLBUTRIN XL) 24 hr tablet 450 mg (has no administration in time range)   budesonide-formoterol (SYMBICORT) 160-4 5 mcg/act inhaler 2 puff (2 puffs Inhalation Not Given 7/23/22 2203)   acetaminophen (TYLENOL) tablet 975 mg (975 mg Oral Given 7/23/22 2158)   oxyCODONE (ROXICODONE) IR tablet 5 mg (has no administration in time range)   sodium chloride tablet 2 g (has no administration in time range)   methocarbamol (ROBAXIN) tablet 750 mg (has no administration in time range)   nystatin (MYCOSTATIN) cream ( Topical Given 7/23/22 2229)   ondansetron (ZOFRAN) injection 4 mg (4 mg Intravenous Given 7/23/22 2055)       Diagnostic Studies  Results Reviewed     Procedure Component Value Units Date/Time    FLU/RSV/COVID - if FLU/RSV clinically relevant [[id number]]  (Normal) Collected: 07/23/22 2117    Lab Status: Final result Specimen: Nares from Nose Updated: 07/23/22 2159     SARS-CoV-2 Negative     INFLUENZA A PCR Negative     INFLUENZA B PCR Negative     RSV PCR Negative    Narrative:      FOR PEDIATRIC PATIENTS - copy/paste COVID Guidelines URL to browser: [email or web address]  [employer]    SARS-CoV-2 assay is a Nucleic Acid Amplification assay intended for the  qualitative detection of nucleic acid from SARS-CoV-2 in nasopharyngeal  swabs  Results are for the presumptive identification of SARS-CoV-2 RNA  Positive results are indicative of infection with SARS-CoV-2, the virus  causing COVID-19, but do not rule out bacterial infection or co-infection  with other viruses  Laboratories within the [place] and its  territories are required to report all positive results to the appropriate  public health authorities  Negative results do not preclude SARS-CoV-2  infection and should not be used as the sole basis for treatment or other  patient management decisions  Negative results must be combined with  clinical observations, patient history, and epidemiological information  This test has not been FDA cleared or approved  This test has been authorized by FDA under an Emergency Use Authorization  (EUA)  This test is only authorized for the duration of time the  declaration that circumstances exist justifying the authorization of the  emergency use of an in vitro diagnostic tests for detection of SARS-CoV-2  virus and/or diagnosis of COVID-19 infection under section 564(b)(1) of  the Act, 21 U  S C  876ZJK-7(B)(6), unless the authorization is terminated  or revoked sooner  The test has been validated but independent review by FDA  and CLIA is pending  Test performed using [employer] Gene[employer]pert: This RT-PCR assay targets N2,  a region unique to SARS-CoV-2  A conserved region in the E-gene was chosen  for pan-Sarbecovirus detection which includes SARS-CoV-2      Basic metabolic panel [[id number]]  (Abnormal) Collected: 07/23/22 2053    Lab Status: Final result Specimen: Blood from Arm, Left Updated: 07/23/22 2117     Sodium 135 mmol/L      Potassium 3 7 mmol/L      Chloride 91 mmol/L      CO2 40 mmol/L      ANION GAP 4 mmol/L      BUN 8 mg/dL      Creatinine 0 65 mg/dL      Glucose 113 mg/dL      Calcium 8 5 mg/dL      eGFR 112 ml/min/1 73sq m     Narrative:      [place] guidelines for Chronic Kidney Disease (CKD):     Stage 1 with normal or high GFR (GFR > 90 mL/min/1 73 square meters)    Stage 2 Mild CKD (GFR = 60-89 mL/min/1 73 square meters)    Stage 3A Moderate CKD (GFR = 45-59 mL/min/1 73 square meters)    Stage 3B Moderate CKD (GFR = 30-44 mL/min/1 73 square meters)    Stage 4 Severe CKD (GFR = 15-29 mL/min/1 73 square meters)    Stage 5 End Stage CKD (GFR <15 mL/min/1 73 square meters)  Note: GFR calculation is accurate only with a steady state creatinine    Hepatic function panel [[id number]]  (Abnormal) Collected: 07/23/22 2053    Lab Status: Final result Specimen: Blood from Arm, Left Updated: 07/23/22 2117     Total Bilirubin 0 71 mg/dL      Bilirubin, Direct 0 29 mg/dL      Alkaline Phosphatase 124 U/L      AST 15 U/L      ALT 11 U/L      Total Protein 6 6 g/dL      Albumin 2 5 g/dL     Lipase [[id number]]  (Normal) Collected: 07/23/22 2053    Lab Status: Final result Specimen: Blood from Arm, Left Updated: 07/23/22 2117     Lipase 16 u/L     CBC and differential [[id number]]  (Abnormal) Collected: 07/23/22 2053    Lab Status: Final result Specimen: Blood from Arm, Left Updated: 07/23/22 2057     WBC 5 67 Thousand/uL      RBC 3 00 Million/uL      Hemoglobin 8 5 g/dL      Hematocrit 29 0 %      MCV 97 fL      MCH 28 3 pg      MCHC 29 3 g/dL      RDW 17 4 %      MPV 9 5 fL      Platelets 149 Thousands/uL      nRBC 1 /100 WBCs      Neutrophils Relative 53 %      Immat GRANS % 1 %      Lymphocytes Relative 35 %      Monocytes Relative 10 %      Eosinophils Relative 1 %      Basophils Relative 0 %      Neutrophils Absolute 3 02 Thousands/µL      Immature Grans Absolute 0 03 Thousand/uL      Lymphocytes Absolute 1 97 Thousands/µL      Monocytes Absolute 0 55 Thousand/µL      Eosinophils Absolute 0 08 Thousand/µL      Basophils Absolute 0 02 Thousands/µL                  XR chest 1 view portable   ED Interpretation by Erika Vieyra DO (07/23 2146)   No acute cardiopulmonary findings  Cardiomegaly unchanged  No significant changes since previous x-ray  Final Result by Jose Juan Sutherland MD (07/23 2215)      Mild pulmonary vascular congestion/pulmonary edema similar to prior exam   Cardiomegaly  Workstation performed: [id number]                    Procedures  Procedures         ED Course  ED Course as of 07/24/22 0609   Sat Jul 23, 2022 2058 Hemoglobin(!): 8 5  Above baseline  2117 Procedure Note: EKG  Date/Time: 07/23/22 9:17 PM   Interpreted by: Nacho Swan  Indications / Diagnosis: SOB  ECG reviewed by me, the ED Provider: yes   The EKG demonstrates:  Rhythm: a fib 77  bpm  Intervals: normal intervals  Axis: normal axis  QRS/Blocks: normal QRS  ST Changes: No acute ST Changes, no STD/KOLTON  Q in 3  Low voltage   2120 Alkaline Phosphatase(!): 124  Stable                                 SBIRT 22yo+    Flowsheet Row Most Recent Value   SBIRT (25 yo +)    In order to provide better care to our patients, we are screening all of our patients for alcohol and drug use  Would it be okay to ask you these screening questions?  No Filed at: 07/23/2022 2059                    MDM  Number of Diagnoses or Management Options  Abdominal pain: new and requires workup  Shortness of breath: new and requires workup  Diagnosis management comments: Patient presents with shortness of breath  Also complains of abdominal pain  Abdominal pain is located side of the EC fistula  Abdomen is soft  Generally nontender  No rebound or guarding  No suspicion for acute surgical process at this point as patient is passing flatus, normal bowel movements and is having nausea without emesis  Not consistent with SBO  Will evaluate for electrolyte abnormalities, anemia, pneumonia, pneumothorax  Patient is anticoagulated and not having chest pain  No suspicion for pulmonary embolism  Will evaluate for COVID  Will evaluate for arrhythmia  Workup without acute findings  Patient tolerating po intake  Will discharge home pending transport  Return precautions given  Amount and/or Complexity of Data Reviewed  Clinical lab tests: ordered and reviewed  Tests in the radiology section of CPT®: ordered and reviewed  Review and summarize past medical records: yes  Independent visualization of images, tracings, or specimens: yes    Risk of Complications, Morbidity, and/or Mortality  Presenting problems: moderate  Diagnostic procedures: moderate  Management options: moderate        Disposition  Final diagnoses:   Shortness of breath   Abdominal pain     Time reflects when diagnosis was documented in both MDM as applicable and the Disposition within this note     Time User Action Codes Description Comment    7/23/2022 10:04 PM Euna Yi Add [R06 02] Shortness of breath     7/23/2022 10:22 PM Euna Yi Add [R10 9] Abdominal pain       ED Disposition     ED Disposition   Discharge    Condition   Stable    Date/Time   Sun Jul 24, 2022  5:33 AM    Comment   Efren Easley discharge to home/self care                 Follow-up Information     Follow up With Specialties Details Why Contact Info Additional [address] Jaylin Krishnamurthy MD Internal Medicine  For re-evaluation as soon as possible [id number]  [phone]       [address] Emergency Department Emergency Medicine  If symptoms worsen [address] [place], [address] Emergency Department, [address], [address]          Current Discharge Medication List      CONTINUE these medications which have NOT CHANGED    Details   acetaminophen (TYLENOL) 325 mg tablet Take 3 tablets (975 mg total) by mouth every 8 (eight) hours  Qty: 30 tablet, Refills: 0    Associated Diagnoses: Atrial fibrillation with RVR ([facility])      aluminum-magnesium hydroxide-simethicone (MYLANTA) 200-200-20 mg/5 mL suspension Take 30 mL by mouth every 6 (six) hours as needed for indigestion or heartburn  Qty: 355 mL, Refills: 0    Associated Diagnoses: Esophagitis      ammonium lactate (LAC-HYDRIN) 12 % cream Apply topically 2 (two) times a day  Qty: 385 g, Refills: 0    Associated Diagnoses: Morbid obesity ([facility])      budesonide-formoterol (SYMBICORT) 160-4 5 mcg/act inhaler Inhale 2 puffs 2 (two) times a day Rinse mouth after use        buPROPion (FORFIVO XL) 450 MG 24 hr tablet Take 1 tablet (450 mg total) by mouth daily  Refills: 0    Associated Diagnoses: CHF (congestive heart failure) ([facility])      Cholecalciferol (VITAMIN D) 50 MCG (2000 UT) tablet Take 2,000 Units by mouth daily      cyanocobalamin (VITAMIN B-12) 1000 MCG tablet Take 1,000 mcg by mouth daily      dabigatran etexilate (PRADAXA) 150 mg capsu Take 150 mg by mouth 2 (two) times a day      dicyclomine (BENTYL) 20 mg tablet Take 1 tablet (20 mg total) by mouth 4 (four) times a day (before meals and at bedtime)  Qty: 120 tablet, Refills: 0    Associated Diagnoses: Intractable abdominal pain      digoxin (LANOXIN) 0 25 mg tablet Take 1 tablet (250 mcg total) by mouth daily  Qty: 30 tablet, Refills: 0    Associated Diagnoses: Atrial fibrillation with RVR (HCC)      docusate sodium (COLACE) 100 mg capsule Take 1 capsule (100 mg total) by mouth 2 (two) times a day  Qty: 30 capsule, Refills: 0    Associated Diagnoses: Constipation      famotidine (PEPCID) 20 mg tablet Take 1 tablet (20 mg total) by mouth daily  Qty: 30 tablet, Refills: 0    Associated Diagnoses: Esophagitis      gabapentin (NEURONTIN) 250 mg/5 mL solution Take 6 mL (300 mg total) by mouth 3 (three) times a day  Qty: 150 mL, Refills: 0    Associated Diagnoses: Atrial fibrillation with RVR (HCC)      ipratropium-albuterol (DUO-NEB) 0 5-2 5 mg/3 mL nebulizer solution Take 3 mL by nebulization 3 (three) times a day      levothyroxine 25 mcg tablet Take 25 mcg by mouth daily      loratadine (CLARITIN) 10 mg tablet Take 10 mg by mouth daily      methocarbamol (ROBAXIN) 750 mg tablet Take 1 tablet (750 mg total) by mouth every 6 (six) hours  Qty: 30 tablet, Refills: 0    Associated Diagnoses: Atrial fibrillation with RVR (HCC)      metoprolol tartrate (LOPRESSOR) 25 mg tablet Take 1 tablet (25 mg total) by mouth every 8 (eight) hours  Qty: 40 tablet, Refills: 0    Associated Diagnoses: Atrial fibrillation with RVR (HCC)      nystatin (MYCOSTATIN) cream Apply topically 2 (two) times a day  Qty: 30 g, Refills: 0    Associated Diagnoses:  Morbid obesity (HCC)      omeprazole (PriLOSEC) 20 mg delayed release capsule Take 40 mg by mouth daily      ondansetron (ZOFRAN) 4 mg tablet Take 1 tablet (4 mg total) by mouth every 8 (eight) hours as needed for nausea or vomiting  Qty: 20 tablet, Refills: 0    Associated Diagnoses: Nausea and vomiting; Esophagitis      oxyCODONE (ROXICODONE) 5 mg/5 mL solution Take 5 mL (5 mg total) by mouth every 4 (four) hours as needed for moderate pain for up to 10 days Max Daily Amount: 30 mg  Qty: 30 mL, Refills: 0    Associated Diagnoses: Atrial fibrillation with RVR (HCC)      polyethylene glycol (MIRALAX) 17 g packet Take 17 g by mouth daily  Qty: 10 each, Refills: 0 Associated Diagnoses: Constipation      Potassium Chloride (KCL-20 PO) Take 20 mg by mouth in the morning      QUEtiapine (SEROquel) 50 mg tablet Take 1 tablet (50 mg total) by mouth daily at bedtime  Qty: 30 tablet, Refills: 0    Associated Diagnoses: Atrial fibrillation with RVR (HCC)      sodium chloride (OCEAN) 0 65 % nasal spray 1 spray into each nostril as needed for congestion  Refills: 0    Associated Diagnoses: Nasal congestion      sodium chloride 1 g tablet Take 2 tablets (2 g total) by mouth 3 (three) times a day  Qty: 60 tablet, Refills: 0    Associated Diagnoses: Atrial fibrillation with RVR (HCC)      spironolactone (ALDACTONE) 50 mg tablet Take 50 mg by mouth daily      torsemide 40 MG TABS Take 40 mg by mouth 2 (two) times a day  Qty: 30 tablet, Refills: 0    Associated Diagnoses: Acute on chronic diastolic congestive heart failure (HCC)             No discharge procedures on file      PDMP Review       Value Time User    PDMP Reviewed  Yes 3/14/2022  8:42 AM Celestine Sandifer, PA-C          ED Provider  Electronically Signed by           Erika Vieyra, DO  07/24/22 Santa Kim 150, DO  07/24/22 Santa Kim 150, DO  07/24/22 Κ[address]έτη 290, DO  07/24/22 1175

## 2022-07-24 NOTE — ED NOTES
Report to [facility] aware awaiting transport time     Isidro Kline RN  07/24/22 2428 KINSEY Nagy RN  07/24/22 1556

## 2022-07-24 NOTE — ED NOTES
Called [facility] to arrange transport, will call back with a transport time       Armand Collet, RN  07/23/22 0021

## 2022-07-25 LAB
QRS AXIS: 14 DEGREES
QRSD INTERVAL: 90 MS
QT INTERVAL: 328 MS
QTC INTERVAL: 371 MS
T WAVE AXIS: 252 DEGREES
VENTRICULAR RATE: 77 BPM

## 2022-07-25 PROCEDURE — 93010 ELECTROCARDIOGRAM REPORT: CPT | Performed by: INTERNAL MEDICINE

## 2022-07-26 ENCOUNTER — APPOINTMENT (EMERGENCY)
Dept: RADIOLOGY | Facility: HOSPITAL | Age: 52
DRG: 048 | End: 2022-07-26
Payer: COMMERCIAL

## 2022-07-26 ENCOUNTER — HOSPITAL ENCOUNTER (INPATIENT)
Facility: HOSPITAL | Age: 52
LOS: 5 days | Discharge: NURSING, REHAB OR LONG-TERM CARE | DRG: 048 | End: 2022-08-03
Attending: EMERGENCY MEDICINE | Admitting: INTERNAL MEDICINE
Payer: COMMERCIAL

## 2022-07-26 DIAGNOSIS — R11.2 NAUSEA AND VOMITING: Primary | ICD-10-CM

## 2022-07-26 DIAGNOSIS — R10.9 ABDOMINAL PAIN: ICD-10-CM

## 2022-07-26 LAB
BASOPHILS # BLD AUTO: 0.01 THOUSANDS/ΜL (ref 0–0.1)
BASOPHILS NFR BLD AUTO: 0 % (ref 0–1)
EOSINOPHIL # BLD AUTO: 0.04 THOUSAND/ΜL (ref 0–0.61)
EOSINOPHIL NFR BLD AUTO: 1 % (ref 0–6)
ERYTHROCYTE [DISTWIDTH] IN BLOOD BY AUTOMATED COUNT: 17.5 % (ref 11.6–15.1)
HCT VFR BLD AUTO: 27.7 % (ref 36.5–49.3)
HGB BLD-MCNC: 8.1 G/DL (ref 12–17)
IMM GRANULOCYTES # BLD AUTO: 0.01 THOUSAND/UL (ref 0–0.2)
IMM GRANULOCYTES NFR BLD AUTO: 0 % (ref 0–2)
LYMPHOCYTES # BLD AUTO: 1.68 THOUSANDS/ΜL (ref 0.6–4.47)
LYMPHOCYTES NFR BLD AUTO: 35 % (ref 14–44)
MCH RBC QN AUTO: 28.4 PG (ref 26.8–34.3)
MCHC RBC AUTO-ENTMCNC: 29.2 G/DL (ref 31.4–37.4)
MCV RBC AUTO: 97 FL (ref 82–98)
MONOCYTES # BLD AUTO: 0.49 THOUSAND/ΜL (ref 0.17–1.22)
MONOCYTES NFR BLD AUTO: 10 % (ref 4–12)
NEUTROPHILS # BLD AUTO: 2.55 THOUSANDS/ΜL (ref 1.85–7.62)
NEUTS SEG NFR BLD AUTO: 54 % (ref 43–75)
NRBC BLD AUTO-RTO: 0 /100 WBCS
PLATELET # BLD AUTO: 236 THOUSANDS/UL (ref 149–390)
PMV BLD AUTO: 9.4 FL (ref 8.9–12.7)
RBC # BLD AUTO: 2.85 MILLION/UL (ref 3.88–5.62)
WBC # BLD AUTO: 4.78 THOUSAND/UL (ref 4.31–10.16)

## 2022-07-26 PROCEDURE — 99285 EMERGENCY DEPT VISIT HI MDM: CPT | Performed by: EMERGENCY MEDICINE

## 2022-07-26 PROCEDURE — 71045 X-RAY EXAM CHEST 1 VIEW: CPT

## 2022-07-26 PROCEDURE — 36415 COLL VENOUS BLD VENIPUNCTURE: CPT

## 2022-07-26 PROCEDURE — 85025 COMPLETE CBC W/AUTO DIFF WBC: CPT

## 2022-07-26 PROCEDURE — 80053 COMPREHEN METABOLIC PANEL: CPT

## 2022-07-26 PROCEDURE — 99285 EMERGENCY DEPT VISIT HI MDM: CPT

## 2022-07-26 PROCEDURE — 93005 ELECTROCARDIOGRAM TRACING: CPT

## 2022-07-26 PROCEDURE — 83690 ASSAY OF LIPASE: CPT

## 2022-07-26 RX ORDER — ONDANSETRON 2 MG/ML
4 INJECTION INTRAMUSCULAR; INTRAVENOUS ONCE
Status: COMPLETED | OUTPATIENT
Start: 2022-07-26 | End: 2022-07-27

## 2022-07-26 RX ORDER — HYDROMORPHONE HCL/PF 1 MG/ML
1 SYRINGE (ML) INJECTION ONCE
Status: COMPLETED | OUTPATIENT
Start: 2022-07-26 | End: 2022-07-27

## 2022-07-27 ENCOUNTER — APPOINTMENT (EMERGENCY)
Dept: CT IMAGING | Facility: HOSPITAL | Age: 52
DRG: 048 | End: 2022-07-27
Payer: COMMERCIAL

## 2022-07-27 LAB
ALBUMIN SERPL BCP-MCNC: 2.5 G/DL (ref 3.5–5)
ALP SERPL-CCNC: 107 U/L (ref 34–104)
ALT SERPL W P-5'-P-CCNC: 13 U/L (ref 7–52)
AST SERPL W P-5'-P-CCNC: 49 U/L (ref 13–39)
ATRIAL RATE: 234 BPM
BASE EX.OXY STD BLDV CALC-SCNC: 55 % (ref 60–80)
BASE EXCESS BLDV CALC-SCNC: 21.9 MMOL/L
BILIRUB SERPL-MCNC: 1.12 MG/DL (ref 0.2–1)
BUN SERPL-MCNC: 8 MG/DL (ref 5–25)
CALCIUM ALBUM COR SERPL-MCNC: 9 MG/DL (ref 8.3–10.1)
CALCIUM SERPL-MCNC: 7.8 MG/DL (ref 8.4–10.2)
CHLORIDE SERPL-SCNC: 82 MMOL/L (ref 96–108)
CO2 SERPL-SCNC: >45 MMOL/L (ref 21–32)
CREAT SERPL-MCNC: 0.68 MG/DL (ref 0.6–1.3)
GFR SERPL CREATININE-BSD FRML MDRD: 110 ML/MIN/1.73SQ M
GLUCOSE SERPL-MCNC: 96 MG/DL (ref 65–140)
HCO3 BLDV-SCNC: 50.4 MMOL/L (ref 24–30)
LIPASE SERPL-CCNC: 18 U/L (ref 11–82)
O2 CT BLDV-SCNC: 7.2 ML/DL
PCO2 BLDV: 87.6 MM HG (ref 42–50)
PH BLDV: 7.38 [PH] (ref 7.3–7.4)
PO2 BLDV: 30.8 MM HG (ref 35–45)
POTASSIUM SERPL-SCNC: 4 MMOL/L (ref 3.5–5.3)
PROT SERPL-MCNC: 6.7 G/DL (ref 6.4–8.4)
QRS AXIS: 42 DEGREES
QRSD INTERVAL: 76 MS
QT INTERVAL: 382 MS
QTC INTERVAL: 397 MS
SODIUM SERPL-SCNC: 135 MMOL/L (ref 135–147)
T WAVE AXIS: 266 DEGREES
VENTRICULAR RATE: 65 BPM

## 2022-07-27 PROCEDURE — C9113 INJ PANTOPRAZOLE SODIUM, VIA: HCPCS | Performed by: INTERNAL MEDICINE

## 2022-07-27 PROCEDURE — NC001 PR NO CHARGE: Performed by: PHYSICIAN ASSISTANT

## 2022-07-27 PROCEDURE — 96374 THER/PROPH/DIAG INJ IV PUSH: CPT

## 2022-07-27 PROCEDURE — G1004 CDSM NDSC: HCPCS

## 2022-07-27 PROCEDURE — 94002 VENT MGMT INPAT INIT DAY: CPT

## 2022-07-27 PROCEDURE — 36415 COLL VENOUS BLD VENIPUNCTURE: CPT

## 2022-07-27 PROCEDURE — 94760 N-INVAS EAR/PLS OXIMETRY 1: CPT

## 2022-07-27 PROCEDURE — 96376 TX/PRO/DX INJ SAME DRUG ADON: CPT

## 2022-07-27 PROCEDURE — 93010 ELECTROCARDIOGRAM REPORT: CPT | Performed by: INTERNAL MEDICINE

## 2022-07-27 PROCEDURE — 74177 CT ABD & PELVIS W/CONTRAST: CPT

## 2022-07-27 PROCEDURE — 96375 TX/PRO/DX INJ NEW DRUG ADDON: CPT

## 2022-07-27 PROCEDURE — 82805 BLOOD GASES W/O2 SATURATION: CPT

## 2022-07-27 PROCEDURE — 96361 HYDRATE IV INFUSION ADD-ON: CPT

## 2022-07-27 PROCEDURE — 94640 AIRWAY INHALATION TREATMENT: CPT

## 2022-07-27 PROCEDURE — 99220 PR INITIAL OBSERVATION CARE/DAY 70 MINUTES: CPT | Performed by: INTERNAL MEDICINE

## 2022-07-27 RX ORDER — METHOCARBAMOL 750 MG/1
750 TABLET, FILM COATED ORAL EVERY 6 HOURS SCHEDULED
Status: DISCONTINUED | OUTPATIENT
Start: 2022-07-27 | End: 2022-08-03 | Stop reason: HOSPADM

## 2022-07-27 RX ORDER — MAGNESIUM HYDROXIDE/ALUMINUM HYDROXICE/SIMETHICONE 120; 1200; 1200 MG/30ML; MG/30ML; MG/30ML
30 SUSPENSION ORAL EVERY 6 HOURS PRN
Status: DISCONTINUED | OUTPATIENT
Start: 2022-07-27 | End: 2022-08-03 | Stop reason: HOSPADM

## 2022-07-27 RX ORDER — IPRATROPIUM BROMIDE AND ALBUTEROL SULFATE 2.5; .5 MG/3ML; MG/3ML
3 SOLUTION RESPIRATORY (INHALATION) 3 TIMES DAILY
Status: DISCONTINUED | OUTPATIENT
Start: 2022-07-27 | End: 2022-07-27

## 2022-07-27 RX ORDER — HYDROMORPHONE HCL/PF 1 MG/ML
0.5 SYRINGE (ML) INJECTION ONCE
Status: COMPLETED | OUTPATIENT
Start: 2022-07-27 | End: 2022-07-27

## 2022-07-27 RX ORDER — PANTOPRAZOLE SODIUM 40 MG/10ML
40 INJECTION, POWDER, LYOPHILIZED, FOR SOLUTION INTRAVENOUS EVERY 12 HOURS SCHEDULED
Status: DISCONTINUED | OUTPATIENT
Start: 2022-07-27 | End: 2022-08-03 | Stop reason: HOSPADM

## 2022-07-27 RX ORDER — DOCUSATE SODIUM 100 MG/1
100 CAPSULE, LIQUID FILLED ORAL 2 TIMES DAILY
Status: DISCONTINUED | OUTPATIENT
Start: 2022-07-27 | End: 2022-08-03 | Stop reason: HOSPADM

## 2022-07-27 RX ORDER — FAMOTIDINE 20 MG/1
20 TABLET, FILM COATED ORAL DAILY
Status: DISCONTINUED | OUTPATIENT
Start: 2022-07-28 | End: 2022-07-29

## 2022-07-27 RX ORDER — POTASSIUM CHLORIDE 20 MEQ/1
20 TABLET, EXTENDED RELEASE ORAL DAILY
Status: DISCONTINUED | OUTPATIENT
Start: 2022-07-28 | End: 2022-07-29

## 2022-07-27 RX ORDER — POLYETHYLENE GLYCOL 3350 17 G/17G
17 POWDER, FOR SOLUTION ORAL DAILY
Status: DISCONTINUED | OUTPATIENT
Start: 2022-07-28 | End: 2022-08-03 | Stop reason: HOSPADM

## 2022-07-27 RX ORDER — SODIUM CHLORIDE 1000 MG
2 TABLET, SOLUBLE MISCELLANEOUS 3 TIMES DAILY
Status: DISCONTINUED | OUTPATIENT
Start: 2022-07-27 | End: 2022-08-03 | Stop reason: HOSPADM

## 2022-07-27 RX ORDER — QUETIAPINE FUMARATE 25 MG/1
50 TABLET, FILM COATED ORAL
Status: DISCONTINUED | OUTPATIENT
Start: 2022-07-27 | End: 2022-08-03 | Stop reason: HOSPADM

## 2022-07-27 RX ORDER — DIGOXIN 125 MCG
250 TABLET ORAL DAILY
Status: DISCONTINUED | OUTPATIENT
Start: 2022-07-28 | End: 2022-07-29

## 2022-07-27 RX ORDER — ECHINACEA PURPUREA EXTRACT 125 MG
1 TABLET ORAL
Status: DISCONTINUED | OUTPATIENT
Start: 2022-07-27 | End: 2022-08-03 | Stop reason: HOSPADM

## 2022-07-27 RX ORDER — SODIUM CHLORIDE, SODIUM GLUCONATE, SODIUM ACETATE, POTASSIUM CHLORIDE, MAGNESIUM CHLORIDE, SODIUM PHOSPHATE, DIBASIC, AND POTASSIUM PHOSPHATE .53; .5; .37; .037; .03; .012; .00082 G/100ML; G/100ML; G/100ML; G/100ML; G/100ML; G/100ML; G/100ML
75 INJECTION, SOLUTION INTRAVENOUS CONTINUOUS
Status: DISCONTINUED | OUTPATIENT
Start: 2022-07-27 | End: 2022-07-29

## 2022-07-27 RX ORDER — OXYCODONE HYDROCHLORIDE 5 MG/1
5 TABLET ORAL EVERY 4 HOURS PRN
Status: DISCONTINUED | OUTPATIENT
Start: 2022-07-27 | End: 2022-07-28

## 2022-07-27 RX ORDER — ONDANSETRON 2 MG/ML
4 INJECTION INTRAMUSCULAR; INTRAVENOUS EVERY 6 HOURS PRN
Status: DISCONTINUED | OUTPATIENT
Start: 2022-07-27 | End: 2022-07-27

## 2022-07-27 RX ORDER — IPRATROPIUM BROMIDE AND ALBUTEROL SULFATE 2.5; .5 MG/3ML; MG/3ML
3 SOLUTION RESPIRATORY (INHALATION) ONCE
Status: COMPLETED | OUTPATIENT
Start: 2022-07-27 | End: 2022-07-27

## 2022-07-27 RX ORDER — DABIGATRAN ETEXILATE 150 MG/1
150 CAPSULE ORAL 2 TIMES DAILY
Status: DISCONTINUED | OUTPATIENT
Start: 2022-07-27 | End: 2022-07-29

## 2022-07-27 RX ORDER — GABAPENTIN 250 MG/5ML
300 SOLUTION ORAL 3 TIMES DAILY
Status: DISCONTINUED | OUTPATIENT
Start: 2022-07-27 | End: 2022-08-01

## 2022-07-27 RX ORDER — PANTOPRAZOLE SODIUM 40 MG/1
40 TABLET, DELAYED RELEASE ORAL
Status: DISCONTINUED | OUTPATIENT
Start: 2022-07-28 | End: 2022-07-27

## 2022-07-27 RX ORDER — LEVALBUTEROL 1.25 MG/.5ML
1.25 SOLUTION, CONCENTRATE RESPIRATORY (INHALATION)
Status: DISCONTINUED | OUTPATIENT
Start: 2022-07-27 | End: 2022-08-01

## 2022-07-27 RX ORDER — BUPROPION HYDROCHLORIDE 150 MG/1
450 TABLET ORAL DAILY
Status: DISCONTINUED | OUTPATIENT
Start: 2022-07-28 | End: 2022-07-28 | Stop reason: SDUPTHER

## 2022-07-27 RX ORDER — DICYCLOMINE HCL 20 MG
20 TABLET ORAL
Status: DISCONTINUED | OUTPATIENT
Start: 2022-07-27 | End: 2022-07-31

## 2022-07-27 RX ORDER — ACETAMINOPHEN 325 MG/1
975 TABLET ORAL EVERY 8 HOURS SCHEDULED
Status: DISCONTINUED | OUTPATIENT
Start: 2022-07-27 | End: 2022-08-03 | Stop reason: HOSPADM

## 2022-07-27 RX ORDER — ONDANSETRON 2 MG/ML
4 INJECTION INTRAMUSCULAR; INTRAVENOUS EVERY 4 HOURS PRN
Status: DISCONTINUED | OUTPATIENT
Start: 2022-07-27 | End: 2022-08-03 | Stop reason: HOSPADM

## 2022-07-27 RX ORDER — LEVOTHYROXINE SODIUM 0.03 MG/1
25 TABLET ORAL
Status: DISCONTINUED | OUTPATIENT
Start: 2022-07-28 | End: 2022-08-03 | Stop reason: HOSPADM

## 2022-07-27 RX ORDER — BUDESONIDE AND FORMOTEROL FUMARATE DIHYDRATE 160; 4.5 UG/1; UG/1
2 AEROSOL RESPIRATORY (INHALATION) 2 TIMES DAILY
Status: DISCONTINUED | OUTPATIENT
Start: 2022-07-27 | End: 2022-08-03 | Stop reason: HOSPADM

## 2022-07-27 RX ADMIN — BUDESONIDE AND FORMOTEROL FUMARATE DIHYDRATE 2 PUFF: 160; 4.5 AEROSOL RESPIRATORY (INHALATION) at 18:35

## 2022-07-27 RX ADMIN — ONDANSETRON 4 MG: 2 INJECTION INTRAMUSCULAR; INTRAVENOUS at 14:53

## 2022-07-27 RX ADMIN — SODIUM CHLORIDE, SODIUM GLUCONATE, SODIUM ACETATE, POTASSIUM CHLORIDE, MAGNESIUM CHLORIDE, SODIUM PHOSPHATE, DIBASIC, AND POTASSIUM PHOSPHATE 75 ML/HR: .53; .5; .37; .037; .03; .012; .00082 INJECTION, SOLUTION INTRAVENOUS at 16:57

## 2022-07-27 RX ADMIN — ACETAMINOPHEN 975 MG: 325 TABLET ORAL at 14:49

## 2022-07-27 RX ADMIN — HYDROMORPHONE HYDROCHLORIDE 0.5 MG: 1 INJECTION, SOLUTION INTRAMUSCULAR; INTRAVENOUS; SUBCUTANEOUS at 04:48

## 2022-07-27 RX ADMIN — ONDANSETRON 4 MG: 2 INJECTION INTRAMUSCULAR; INTRAVENOUS at 18:33

## 2022-07-27 RX ADMIN — GABAPENTIN 300 MG: 250 SOLUTION ORAL at 17:01

## 2022-07-27 RX ADMIN — OXYCODONE HYDROCHLORIDE 5 MG: 5 TABLET ORAL at 18:33

## 2022-07-27 RX ADMIN — LEVALBUTEROL HYDROCHLORIDE 1.25 MG: 1.25 SOLUTION, CONCENTRATE RESPIRATORY (INHALATION) at 21:01

## 2022-07-27 RX ADMIN — DICYCLOMINE HYDROCHLORIDE 20 MG: 20 TABLET ORAL at 21:59

## 2022-07-27 RX ADMIN — ONDANSETRON 4 MG: 2 INJECTION INTRAMUSCULAR; INTRAVENOUS at 00:20

## 2022-07-27 RX ADMIN — IPRATROPIUM BROMIDE 0.5 MG: 0.5 SOLUTION RESPIRATORY (INHALATION) at 21:01

## 2022-07-27 RX ADMIN — IOHEXOL 100 ML: 350 INJECTION, SOLUTION INTRAVENOUS at 06:27

## 2022-07-27 RX ADMIN — PANTOPRAZOLE SODIUM 40 MG: 40 INJECTION, POWDER, FOR SOLUTION INTRAVENOUS at 17:00

## 2022-07-27 RX ADMIN — IOHEXOL 50 ML: 240 INJECTION, SOLUTION INTRATHECAL; INTRAVASCULAR; INTRAVENOUS; ORAL at 03:50

## 2022-07-27 RX ADMIN — HYDROMORPHONE HYDROCHLORIDE 0.5 MG: 1 INJECTION, SOLUTION INTRAMUSCULAR; INTRAVENOUS; SUBCUTANEOUS at 09:48

## 2022-07-27 RX ADMIN — QUETIAPINE FUMARATE 50 MG: 25 TABLET ORAL at 21:59

## 2022-07-27 RX ADMIN — HYDROMORPHONE HYDROCHLORIDE 1 MG: 1 INJECTION, SOLUTION INTRAMUSCULAR; INTRAVENOUS; SUBCUTANEOUS at 00:19

## 2022-07-27 RX ADMIN — SODIUM CHLORIDE 1000 ML: 0.9 INJECTION, SOLUTION INTRAVENOUS at 00:20

## 2022-07-27 RX ADMIN — IPRATROPIUM BROMIDE AND ALBUTEROL SULFATE 3 ML: 2.5; .5 SOLUTION RESPIRATORY (INHALATION) at 09:48

## 2022-07-27 RX ADMIN — METHOCARBAMOL 750 MG: 750 TABLET ORAL at 14:49

## 2022-07-27 NOTE — ED CARE HANDOFF
Emergency Department Sign Out Note        Sign out and transfer of care from Dr Searcy Heimlich  See Separate Emergency Department note  The patient, Jermaine Abel, was evaluated by the previous provider for abdominal pain, nausea and vomiting  Workup Completed:  Labs and imaging    ED Course / Workup Pending (followup): Case was signed out to me pending decision from surgery regarding disposition for today  Surgery ultimately advised admission to Medicine with surgery consult  I then discussed the case with Medicine, who advised p o  Challenge prior to final decision regarding admission today  I re-evaluated the patient  He states that he still cannot even drink water without vomiting  He said he has been able to tolerate ice chips  I asked him to try drinking some water, but he states he does not want to at this time  He has had multiple rounds of pain and nausea medication, but he states he is still in significant pain and not willing to try drinking water at this time  I again discussed the case with DA who agreed to admit for observation at this time  Patient is stable for admission  ED Course as of 07/27/22 1614   Wed Jul 27, 2022   1435 Signed out to me pending final decision from surgery regarding disposition  ED team advised if there is no surgical reason to keep him, he does NOT need to be admitted medically  1122 Discussed case with medicine  They advised if the patient is tolerating p o , they do not see any need for admission  I went and spoke with the patient, however he is insistent that he cannot drink water without vomiting  He is refusing to try at this time  Will discuss admitting for obs        Procedures  MDM        Disposition  Final diagnoses:   Nausea and vomiting   Abdominal pain     Time reflects when diagnosis was documented in both MDM as applicable and the Disposition within this note     Time User Action Codes Description Comment    7/27/2022  9:19 AM Dhaval Vanessa Add [R11 2] Nausea and vomiting     7/27/2022  9:19 AM Dhaval Vanessa Add [R10 9] Abdominal pain     7/27/2022 12:51 PM Vallarirenay Kay Add [R10 9] Intractable abdominal pain     7/27/2022 12:51 PM Vallarirenay Thorntonk Remove [R10 9] Intractable abdominal pain       ED Disposition     ED Disposition   Admit    Condition   Stable    Date/Time   Wed Jul 27, 2022 11:42 AM    Comment   Case was discussed with DA and the patient's admission status was agreed to be Admission Status: observation status to the service of Dr Kris Hawk   Follow-up Information    None       Current Discharge Medication List      CONTINUE these medications which have NOT CHANGED    Details   acetaminophen (TYLENOL) 325 mg tablet Take 3 tablets (975 mg total) by mouth every 8 (eight) hours  Qty: 30 tablet, Refills: 0    Associated Diagnoses: Atrial fibrillation with RVR ([facility] [place])      aluminum-magnesium hydroxide-simethicone (MYLANTA) 200-200-20 mg/5 mL suspension Take 30 mL by mouth every 6 (six) hours as needed for indigestion or heartburn  Qty: 355 mL, Refills: 0    Associated Diagnoses: Esophagitis      ammonium lactate (LAC-HYDRIN) 12 % cream Apply topically 2 (two) times a day  Qty: 385 g, Refills: 0    Associated Diagnoses: Morbid obesity ([facility] [place])      budesonide-formoterol (SYMBICORT) 160-4 5 mcg/act inhaler Inhale 2 puffs 2 (two) times a day Rinse mouth after use        buPROPion (FORFIVO XL) 450 MG 24 hr tablet Take 1 tablet (450 mg total) by mouth daily  Refills: 0    Associated Diagnoses: CHF (congestive heart failure) ([facility] [place])      Cholecalciferol (VITAMIN D) 50 MCG (2000 UT) tablet Take 2,000 Units by mouth daily      cyanocobalamin (VITAMIN B-12) 1000 MCG tablet Take 1,000 mcg by mouth daily      dabigatran etexilate (PRADAXA) 150 mg capsu Take 150 mg by mouth 2 (two) times a day      dicyclomine (BENTYL) 20 mg tablet Take 1 tablet (20 mg total) by mouth 4 (four) times a day (before meals and at bedtime)  Qty: 120 tablet, Refills: 0    Associated Diagnoses: Intractable abdominal pain      digoxin (LANOXIN) 0 25 mg tablet Take 1 tablet (250 mcg total) by mouth daily  Qty: 30 tablet, Refills: 0    Associated Diagnoses: Atrial fibrillation with RVR (HCC)      docusate sodium (COLACE) 100 mg capsule Take 1 capsule (100 mg total) by mouth 2 (two) times a day  Qty: 30 capsule, Refills: 0    Associated Diagnoses: Constipation      famotidine (PEPCID) 20 mg tablet Take 1 tablet (20 mg total) by mouth daily  Qty: 30 tablet, Refills: 0    Associated Diagnoses: Esophagitis      gabapentin (NEURONTIN) 250 mg/5 mL solution Take 6 mL (300 mg total) by mouth 3 (three) times a day  Qty: 150 mL, Refills: 0    Associated Diagnoses: Atrial fibrillation with RVR (HCC)      ipratropium-albuterol (DUO-NEB) 0 5-2 5 mg/3 mL nebulizer solution Take 3 mL by nebulization 3 (three) times a day      levothyroxine 25 mcg tablet Take 25 mcg by mouth daily      loratadine (CLARITIN) 10 mg tablet Take 10 mg by mouth daily      methocarbamol (ROBAXIN) 750 mg tablet Take 1 tablet (750 mg total) by mouth every 6 (six) hours  Qty: 30 tablet, Refills: 0    Associated Diagnoses: Atrial fibrillation with RVR (HCC)      metoprolol tartrate (LOPRESSOR) 25 mg tablet Take 1 tablet (25 mg total) by mouth every 8 (eight) hours  Qty: 40 tablet, Refills: 0    Associated Diagnoses: Atrial fibrillation with RVR (HCC)      nystatin (MYCOSTATIN) cream Apply topically 2 (two) times a day  Qty: 30 g, Refills: 0    Associated Diagnoses:  Morbid obesity (HCC)      omeprazole (PriLOSEC) 20 mg delayed release capsule Take 40 mg by mouth daily      polyethylene glycol (MIRALAX) 17 g packet Take 17 g by mouth daily  Qty: 10 each, Refills: 0    Associated Diagnoses: Constipation      Potassium Chloride (KCL-20 PO) Take 20 mg by mouth in the morning      QUEtiapine (SEROquel) 50 mg tablet Take 1 tablet (50 mg total) by mouth daily at bedtime  Qty: 30 tablet, Refills: 0    Associated Diagnoses: Atrial fibrillation with RVR (HCC)      sodium chloride (OCEAN) 0 65 % nasal spray 1 spray into each nostril as needed for congestion  Refills: 0    Associated Diagnoses: Nasal congestion      sodium chloride 1 g tablet Take 2 tablets (2 g total) by mouth 3 (three) times a day  Qty: 60 tablet, Refills: 0    Associated Diagnoses: Atrial fibrillation with RVR (HCC)      spironolactone (ALDACTONE) 50 mg tablet Take 50 mg by mouth daily      torsemide 40 MG TABS Take 40 mg by mouth 2 (two) times a day  Qty: 30 tablet, Refills: 0    Associated Diagnoses: Acute on chronic diastolic congestive heart failure ([facility] [address] )           No discharge procedures on file         ED Provider  Electronically Signed by     Nicholas Peñaloza PA-C  07/27/22 0231

## 2022-07-27 NOTE — PLAN OF CARE
Problem: GASTROINTESTINAL - ADULT  Goal: Minimal or absence of nausea and/or vomiting  Description: INTERVENTIONS:  - Administer IV fluids if ordered to ensure adequate hydration  - Maintain NPO status until nausea and vomiting are resolved  - Nasogastric tube if ordered  - Administer ordered antiemetic medications as needed  - Provide nonpharmacologic comfort measures as appropriate  - Advance diet as tolerated, if ordered  - Consider nutrition services referral to assist patient with adequate nutrition and appropriate food choices  Outcome: Progressing  Goal: Maintains or returns to baseline bowel function  Description: INTERVENTIONS:  - Assess bowel function  - Encourage oral fluids to ensure adequate hydration  - Administer IV fluids if ordered to ensure adequate hydration  - Administer ordered medications as needed  - Encourage mobilization and activity  - Consider nutritional services referral to assist patient with adequate nutrition and appropriate food choices  Outcome: Progressing  Goal: Maintains adequate nutritional intake  Description: INTERVENTIONS:  - Monitor percentage of each meal consumed  - Identify factors contributing to decreased intake, treat as appropriate  - Assist with meals as needed  - Monitor I&O, weight, and lab values if indicated  - Obtain nutrition services referral as needed  Outcome: Progressing  Goal: Establish and maintain optimal ostomy function  Description: INTERVENTIONS:  - Assess bowel function  - Encourage oral fluids to ensure adequate hydration  - Administer IV fluids if ordered to ensure adequate hydration   - Administer ordered medications as needed  - Encourage mobilization and activity  - Nutrition services referral to assist patient with appropriate food choices  - Assess stoma site  - Consider wound care consult   Outcome: Progressing  Goal: Oral mucous membranes remain intact  Description: INTERVENTIONS  - Assess oral mucosa and hygiene practices  - Implement preventative oral hygiene regimen  - Implement oral medicated treatments as ordered  - Initiate Nutrition services referral as needed  Outcome: Progressing     Problem: GASTROINTESTINAL - ADULT  Goal: Maintains or returns to baseline bowel function  Description: INTERVENTIONS:  - Assess bowel function  - Encourage oral fluids to ensure adequate hydration  - Administer IV fluids if ordered to ensure adequate hydration  - Administer ordered medications as needed  - Encourage mobilization and activity  - Consider nutritional services referral to assist patient with adequate nutrition and appropriate food choices  Outcome: Progressing     Problem: GASTROINTESTINAL - ADULT  Goal: Maintains adequate nutritional intake  Description: INTERVENTIONS:  - Monitor percentage of each meal consumed  - Identify factors contributing to decreased intake, treat as appropriate  - Assist with meals as needed  - Monitor I&O, weight, and lab values if indicated  - Obtain nutrition services referral as needed  Outcome: Progressing     Problem: GASTROINTESTINAL - ADULT  Goal: Establish and maintain optimal ostomy function  Description: INTERVENTIONS:  - Assess bowel function  - Encourage oral fluids to ensure adequate hydration  - Administer IV fluids if ordered to ensure adequate hydration   - Administer ordered medications as needed  - Encourage mobilization and activity  - Nutrition services referral to assist patient with appropriate food choices  - Assess stoma site  - Consider wound care consult   Outcome: Progressing     Problem: GASTROINTESTINAL - ADULT  Goal: Oral mucous membranes remain intact  Description: INTERVENTIONS  - Assess oral mucosa and hygiene practices  - Implement preventative oral hygiene regimen  - Implement oral medicated treatments as ordered  - Initiate Nutrition services referral as needed  Outcome: Progressing     Problem: SKIN/TISSUE INTEGRITY - ADULT  Goal: Skin Integrity remains intact(Skin Breakdown Prevention)  Description: Assess:  -Perform Arnoldo assessment every   -Clean and moisturize skin every   -Inspect skin when repositioning, toileting, and assisting with ADLS  -Assess under medical devices such as every   -Assess extremities for adequate circulation and sensation     Bed Management:  -Have minimal linens on bed & keep smooth, unwrinkled  -Change linens as needed when moist or perspiring  -Avoid sitting or lying in one position for more than  hours while in bed  -Keep HOB at degrees     Toileting:  -Offer bedside commode  -Assess for incontinence every   -Use incontinent care products after each incontinent episode such as     Activity:  -Mobilize patient  times a day  -Encourage activity and walks on unit  -Encourage or provide ROM exercises   -Turn and reposition patient everyHours  -Use appropriate equipment to lift or move patient in bed  -Instruct/ Assist with weight shifting everywhen out of bed in chair  -Consider limitation of chair time hour intervals    Skin Care:  -Avoid use of baby powder, tape, friction and shearing, hot water or constrictive clothing  -Relieve pressure over bony prominences using -Do not massage red bony areas    Next Steps:  -Teach patient strategies to minimize risks such as    -Consider consults to  interdisciplinary teams such as   Outcome: Progressing  Goal: Incision(s), wounds(s) or drain site(s) healing without S/S of infection  Description: INTERVENTIONS  - Assess and document dressing, incision, wound bed, drain sites and surrounding tissue  - Provide patient and family education  - Perform skin care/dressing changes every   Outcome: Progressing  Goal: Pressure injury heals and does not worsen  Description: Interventions:  - Implement low air loss mattress or specialty surface (Criteria met)  - Apply silicone foam dressing  - Instruct/assist with weight shifting every minutes when in chair   - Limit chair time to hour interv  - Use special pressure reducing interventions such aswhen in chair   - Apply fecal or urinary incontinence containment device   - Perform passive or active ROM every   - Turn and reposition patient & offload boy prominences every  hours   - Utilize friction reducing device or surface for transfers   - Consider consults to  interdisciplinary teams such as   - Use incontinent care products after each incontinent episode such as   - Consider nutrition services referral as needed  Outcome: Progressing

## 2022-07-27 NOTE — ASSESSMENT & PLAN NOTE
Wt Readings from Last 3 Encounters:   07/23/22 (!) 231 kg (510 lb)   07/19/22 (!) 228 kg (503 lb)   07/19/22 (!) 229 kg (503 lb 15 5 oz)     · Will hold diuretics for now given poor oral intake  · Resume when able

## 2022-07-27 NOTE — RESPIRATORY THERAPY NOTE
07/27/22 1613   Respiratory Protocol   Protocol Initiated? Yes   Protocol Selection Respiratory   Language Barrier? No   Medical & Social History Reviewed? Yes   Diagnostic Studies Reviewed? Yes   Physical Assessment Performed? Yes   Home Devices/Therapy BiPAP/CPAP; Home O2;Other (Comment)  (Neb treatment and inhlaers)   Respiratory Plan Vent/NIV/HFNC;Mild Distress pathway   Respiratory Assessment   Assessment Type Assess only   General Appearance Awake; Alert   Respiratory Pattern Dyspnea with exertion   Chest Assessment Chest expansion symmetrical   Bilateral Breath Sounds Diminished;Clear   Cough Productive   Resp Comments Pt takes breathing treatments at home  Does not know what he takes  Pt is on home O2  Uses 2L  Pt does use a cpap or bipap is not sure which one  Pt does have COPD  Pt does complain of a cough it is productive coughs up brownish mucus  Pt complains that he sometimes feels sob but is not right now  Pt has clear dimished bilateral bs  Will change duo to tid xop and atrovent  Gave IS to use goal 3100  Achieved around 250  Pt is currently on 2L NC and satting 97%  Will continue to monitor     O2 Device 2L NC   Cough Description   Sputum Amount Moderate   Sputum Color Tan   Sputum Consistency Thick   Sputum How Obtained Spontaneous cough

## 2022-07-27 NOTE — ED PROVIDER NOTES
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 that occurred 2 months prior  The patient describes that he has bounced back and forth between the hospital and his skilled nursing facility over the last month secondary to similar symptoms described above  The patient does endorse that he has continued to have bowel movements, he describes having watery diarrhea, he still passing gas  He denies any recent fevers or chills  He does not have any chest pain at this time  He has had a recent increase in his oxygen requirement, he typically requires 2 L nasal cannula baseline, he was recently increased to 4 L  He denies any recent cough, congestion, upper respiratory illness  History provided by:  Patient   used: No        Prior to Admission Medications   Prescriptions Last Dose Informant Patient Reported? Taking?    Cholecalciferol (VITAMIN D) 50 MCG (2000 UT) tablet  Outside Facility (Specify) Yes No   Sig: Take 2,000 Units by mouth daily   Potassium Chloride (KCL-20 PO)   Yes No   Sig: Take 20 mg by mouth in the morning   QUEtiapine (SEROquel) 50 mg tablet   No No   Sig: Take 1 tablet (50 mg total) by mouth daily at bedtime   acetaminophen (TYLENOL) 325 mg tablet   No No   Sig: Take 3 tablets (975 mg total) by mouth every 8 (eight) hours   aluminum-magnesium hydroxide-simethicone (MYLANTA) 200-200-20 mg/5 mL suspension   No No   Sig: Take 30 mL by mouth every 6 (six) hours as needed for indigestion or heartburn   ammonium lactate (LAC-HYDRIN) 12 % cream   No No   Sig: Apply topically 2 (two) times a day   buPROPion (FORFIVO XL) 450 MG 24 hr tablet   No No   Sig: Take 1 tablet (450 mg total) by mouth daily   budesonide-formoterol (SYMBICORT) 160-4 5 mcg/act inhaler   Yes No   Sig: Inhale 2 puffs 2 (two) times a day Rinse mouth after use     cyanocobalamin (VITAMIN B-12) 1000 MCG tablet  Outside Facility (Specify) Yes No   Sig: Take 1,000 mcg by mouth daily   dabigatran etexilate (PRADAXA) 150 mg capsu  Outside Facility (Specify) Yes No   Sig: Take 150 mg by mouth 2 (two) times a day   dicyclomine (BENTYL) 20 mg tablet   No No   Sig: Take 1 tablet (20 mg total) by mouth 4 (four) times a day (before meals and at bedtime)   digoxin (LANOXIN) 0 25 mg tablet   No No   Sig: Take 1 tablet (250 mcg total) by mouth daily   docusate sodium (COLACE) 100 mg capsule   No No   Sig: Take 1 capsule (100 mg total) by mouth 2 (two) times a day   famotidine (PEPCID) 20 mg tablet   No No   Sig: Take 1 tablet (20 mg total) by mouth daily   gabapentin (NEURONTIN) 250 mg/5 mL solution   No No   Sig: Take 6 mL (300 mg total) by mouth 3 (three) times a day   ipratropium-albuterol (DUO-NEB) 0 5-2 5 mg/3 mL nebulizer solution  Outside Facility (Specify) Yes No   Sig: Take 3 mL by nebulization 3 (three) times a day   levothyroxine 25 mcg tablet  Outside Facility (Specify) Yes No   Sig: Take 25 mcg by mouth daily   loratadine (CLARITIN) 10 mg tablet   Yes No   Sig: Take 10 mg by mouth daily   methocarbamol (ROBAXIN) 750 mg tablet   No No   Sig: Take 1 tablet (750 mg total) by mouth every 6 (six) hours   metoprolol tartrate (LOPRESSOR) 25 mg tablet   No No   Sig: Take 1 tablet (25 mg total) by mouth every 8 (eight) hours   nystatin (MYCOSTATIN) cream   No No   Sig: Apply topically 2 (two) times a day   Patient not taking: Reported on 6/6/2022   omeprazole (PriLOSEC) 20 mg delayed release capsule   Yes No   Sig: Take 40 mg by mouth daily   ondansetron (ZOFRAN) 4 mg tablet   No No   Sig: Take 1 tablet (4 mg total) by mouth every 8 (eight) hours as needed for nausea or vomiting   Patient not taking: Reported on 2022   oxyCODONE (ROXICODONE) 5 mg/5 mL solution   No No   Sig: Take 5 mL (5 mg total) by mouth every 4 (four) hours as needed for moderate pain for up to 10 days Max Daily Amount: 30 mg   polyethylene glycol (MIRALAX) 17 g packet   No No   Sig: Take 17 g by mouth daily   sodium chloride (OCEAN) 0 65 % nasal spray   No No   Si spray into each nostril as needed for congestion   sodium chloride 1 g tablet   No No   Sig: Take 2 tablets (2 g total) by mouth 3 (three) times a day   spironolactone (ALDACTONE) 50 mg tablet  Outside Facility (Specify) Yes No   Sig: Take 50 mg by mouth daily   torsemide 40 MG TABS   No No   Sig: Take 40 mg by mouth 2 (two) times a day      Facility-Administered Medications: None       Past Medical History:   Diagnosis Date    Afib ([place] [address] )     Anemia     Anxiety     Cancer ([place] [address] )     Cardiac disease     Cellulitis     COPD (chronic obstructive pulmonary disease) ([place] [address] )     Depression     Diabetes mellitus ([place] [address] )     [facility]     GERD (gastroesophageal reflux disease)     HBP (high blood pressure)     Heart failure ([facility]     Hx of blood clots     Hypertension     Hypokalemia     Hypothyroid     Obesity     Psychiatric disorder        Past Surgical History:   Procedure Laterality Date    ABDOMINAL HERNIA REPAIR  2019    CHOLECYSTECTOMY      Lap    GASTRECTOMY SLEEVE LAPAROSCOPIC  2018    INCISION AND DRAINAGE OF WOUND Bilateral 2021    Procedure: INCISION AND DRAINAGE (I&D) HEAD/FACE;  Surgeon: Johana Holder MD;  Location:  MAIN OR;  Service: General    IR PICC PLACEMENT DOUBLE LUMEN  2022    IVC FILTER INSERTION      KNEE SURGERY Right     open wound, injury     LAPAROTOMY N/A 2022    Procedure: LAPAROTOMY EXPLORATORY, EXTENSIVE LYSIS OF ADHESIONS, APPLICATION OF ABTHERA WOUND VAC;  Surgeon: Lazarus Silva MD;  Location: [facility];  Service: General    LAPAROTOMY N/A 6/15/2022    Procedure: LAPAROTOMY EXPLORATORY WITH REPAIR OF SEROSAL INJURY;  Surgeon: Shaniqua Olsen MD;  [facility];  Service: General    LEG SURGERY Right 2009    SPLIT THICKNESS SKIN GRAFT N/A 6/30/2022    Procedure: incision and drainage, wash out vac change;  Surgeon: Zach Davidson DO;  [facility];  Service: General    SPLIT THICKNESS SKIN GRAFT N/A 7/5/2022    Procedure: PREPERATION RECIPIENT STSG SITE, DEBRIDEMENT NONVIABLE SKIN EDGE; PREPERATION RECIPIENT SKIN GRAFT SITE; APPLICATION OF SKIN SUBSTITUTE TO DONOR SITE; APPLICATION WOUND VAC X2 GREATER THAN 50CM; Surgeon: Allie Weston DO;  [facility];  Service: General    US GUIDED VASCULAR ACCESS  08/06/2018    VAC DRESSING APPLICATION N/A 7/81/5698    Procedure: CHANGE DRESSING/VAC ABDOMEN;  Surgeon: Shaniqua Olsen MD;  [facility];  Service: General    [address] [place] [address] Road N/A 0/27/5949    Procedure: ABDOMINAL WASHOUT, REPAIR OF SEROSAL TEARS,BRIDING VICRYL MESH, PARTIAL CLOSURE, APPLICATION OF WOUND VAC;  Surgeon: Zach Davidson DO;  [facility];  Service: General    VAC DRESSING APPLICATION N/A 3/85/2058    Procedure: CHANGE DRESSING/VAC ABDOMEN;  Surgeon: Zach Davidson DO;  [facility];  Service: General       Family History   Problem Relation Age of Onset    Lung cancer Father     Diabetes Maternal Aunt     Heart attack Mother     Clotting disorder Mother     Hypertension Mother     Heart failure Mother     Diabetes Mother     Atrial fibrillation Mother     Sleep apnea Mother     Obesity Mother     Asthma Sister     Stroke Neg Hx     Anuerysm Neg Hx     Arrhythmia Neg Hx     Hyperlipidemia Neg Hx         pt unsure    Fainting Neg Hx      I have reviewed and agree with the history as documented      E-Cigarette/Vaping    E-Cigarette Use Never User E-Cigarette/Vaping Substances    Nicotine No     THC No     CBD No     Flavoring No     Other No     Unknown No      Social History     Tobacco Use    Smoking status: Former Smoker     Packs/day: 1 00     Years: 15 00     Pack years: 15 00    Smokeless tobacco: Never Used    Tobacco comment: 1 5ppd x 23 years, quit 2014   Vaping Use    Vaping Use: Never used   Substance Use Topics    Alcohol use: Not Currently    Drug use: No        Review of Systems   Constitutional: Negative for chills and fever  HENT: Negative for ear pain and sore throat  Eyes: Negative for pain and visual disturbance  Respiratory: Negative for cough and shortness of breath  Cardiovascular: Negative for chest pain and palpitations  Gastrointestinal: Positive for abdominal pain, diarrhea, nausea and vomiting  Genitourinary: Negative for dysuria and hematuria  Musculoskeletal: Negative for arthralgias and back pain  Skin: Negative for color change and rash  Neurological: Negative for seizures and syncope  All other systems reviewed and are negative  Physical Exam  ED Triage Vitals [07/26/22 2259]   Temperature Pulse Respirations Blood Pressure SpO2   97 5 °F (36 4 °C) 66 20 (!) 105/45 100 %      Temp Source Heart Rate Source Patient Position - Orthostatic VS BP Location FiO2 (%)   Oral Monitor Sitting Right arm --      Pain Score       8             Orthostatic Vital Signs  Vitals:    07/26/22 2259 07/27/22 0200 07/27/22 0400   BP: (!) 105/45 (!) 115/45 111/51   Pulse: 66 82 72   Patient Position - Orthostatic VS: Sitting Lying Lying       Physical Exam  Vitals and nursing note reviewed  Constitutional:       General: He is in acute distress  Appearance: Normal appearance  He is obese  Comments: Patient does appear to be in mild distress due to pain and discomfort  HENT:      Head: Normocephalic and atraumatic        Right Ear: External ear normal       Left Ear: External ear normal  Mouth/Throat:      Mouth: Mucous membranes are moist       Pharynx: Oropharynx is clear  Eyes:      General: No scleral icterus  Conjunctiva/sclera: Conjunctivae normal    Cardiovascular:      Rate and Rhythm: Normal rate and regular rhythm  Pulses: Normal pulses  Heart sounds: Normal heart sounds  Pulmonary:      Effort: Pulmonary effort is normal       Breath sounds: Normal breath sounds  No wheezing, rhonchi or rales  Comments: Patient has 4 L nasal cannula, he is satting 100%  Abdominal:      Palpations: Abdomen is soft  Tenderness: There is abdominal tenderness in the epigastric area  Comments: Patient has an enterocutaneous fistula in his epigastric region, he has a ostomy bag in place collecting the fistula contents  He also has a dressing in place in this area  He has significant tenderness to palpation around this area  I do not appreciate any erythema or signs of active infection  Musculoskeletal:         General: No tenderness or signs of injury  Cervical back: Neck supple  No rigidity  Skin:     General: Skin is warm  Coloration: Skin is not jaundiced  Findings: Wound present  No erythema or rash  Comments: Patient has a chronic wound on his left thigh that does appear to be healing well, there is no signs of infection, no signs of spreading erythema  Neurological:      General: No focal deficit present  Mental Status: He is alert  Mental status is at baseline     Psychiatric:         Mood and Affect: Mood normal          Behavior: Behavior normal          ED Medications  Medications   ipratropium-albuterol (DUO-NEB) 0 5-2 5 mg/3 mL inhalation solution 3 mL (has no administration in time range)   HYDROmorphone (DILAUDID) injection 0 5 mg (has no administration in time range)   ondansetron (ZOFRAN) injection 4 mg (4 mg Intravenous Given 7/27/22 0020)   HYDROmorphone (DILAUDID) injection 1 mg (1 mg Intravenous Given 7/27/22 0019)   sodium chloride 0 9 % bolus 1,000 mL (0 mL Intravenous Stopped 7/27/22 0212)   iohexol (OMNIPAQUE) 240 MG/ML solution 50 mL (50 mL Oral Given 7/27/22 0350)   HYDROmorphone (DILAUDID) injection 0 5 mg (0 5 mg Intravenous Given 7/27/22 0448)   iohexol (OMNIPAQUE) 350 MG/ML injection (SINGLE-DOSE) 100 mL (100 mL Intravenous Given 7/27/22 0627)       Diagnostic Studies  Results Reviewed     Procedure Component Value Units Date/Time    Blood gas, venous [[id number]]  (Abnormal) Collected: 07/27/22 0042    Lab Status: Final result Specimen: Blood from Arm, Left Updated: 07/27/22 0128     pH, Graeme 7 378     pCO2, Graeme 87 6 mm Hg      pO2, Graeme 30 8 mm Hg      HCO3, Graeme 50 4 mmol/L      Base Excess, Graeme 21 9 mmol/L      O2 Content, Graeme 7 2 ml/dL      O2 HGB, VENOUS 55 0 %     Comprehensive metabolic panel [[id number]]  (Abnormal) Collected: 07/26/22 2335    Lab Status: Final result Specimen: Blood from Arm, Right Updated: 07/27/22 0036     Sodium 135 mmol/L      Potassium 4 0 mmol/L      Chloride 82 mmol/L      CO2 >45 mmol/L      ANION GAP --     BUN 8 mg/dL      Creatinine 0 68 mg/dL      Glucose 96 mg/dL      Calcium 7 8 mg/dL      Corrected Calcium 9 0 mg/dL      AST 49 U/L      ALT 13 U/L      Alkaline Phosphatase 107 U/L      Total Protein 6 7 g/dL      Albumin 2 5 g/dL      Total Bilirubin 1 12 mg/dL      eGFR 110 ml/min/1 73sq m     Narrative:      [place] guidelines for Chronic Kidney Disease (CKD):     Stage 1 with normal or high GFR (GFR > 90 mL/min/1 73 square meters)    Stage 2 Mild CKD (GFR = 60-89 mL/min/1 73 square meters)    Stage 3A Moderate CKD (GFR = 45-59 mL/min/1 73 square meters)    Stage 3B Moderate CKD (GFR = 30-44 mL/min/1 73 square meters)    Stage 4 Severe CKD (GFR = 15-29 mL/min/1 73 square meters)    Stage 5 End Stage CKD (GFR <15 mL/min/1 73 square meters)  Note: GFR calculation is accurate only with a steady state creatinine    Lipase [[id number]]  (Normal) Collected: 07/26/22 2335    Lab Status: Final result Specimen: Blood from Arm, Right Updated: 07/27/22 0023     Lipase 18 u/L     CBC and differential [[id number]]  (Abnormal) Collected: 07/26/22 2335    Lab Status: Final result Specimen: Blood from Arm, Right Updated: 07/26/22 2344     WBC 4 78 Thousand/uL      RBC 2 85 Million/uL      Hemoglobin 8 1 g/dL      Hematocrit 27 7 %      MCV 97 fL      MCH 28 4 pg      MCHC 29 2 g/dL      RDW 17 5 %      MPV 9 4 fL      Platelets 374 Thousands/uL      nRBC 0 /100 WBCs      Neutrophils Relative 54 %      Immat GRANS % 0 %      Lymphocytes Relative 35 %      Monocytes Relative 10 %      Eosinophils Relative 1 %      Basophils Relative 0 %      Neutrophils Absolute 2 55 Thousands/µL      Immature Grans Absolute 0 01 Thousand/uL      Lymphocytes Absolute 1 68 Thousands/µL      Monocytes Absolute 0 49 Thousand/µL      Eosinophils Absolute 0 04 Thousand/µL      Basophils Absolute 0 01 Thousands/µL                  CT abdomen pelvis with contrast   Final Result by Payton Fallon MD (07/27 2876)         1  Technically limited study  2   Postoperative change as noted with large ventral abdominal wall hernia containing loops of large and small bowel without evidence for strangulation or obstruction  Ventral abdominal wall wound appears dehiscent with presumed Abthera in place and    findings consistent with known enterocutaneous fistula  3   Additional findings as noted  Workstation performed: [id number]         XR chest 1 view portable   ED Interpretation by Dhaval Fu Cera, DO (07/27 0115)   Cardiomegaly, no acute abnormalities, similar to prior      Final Result by Mitch Castillo MD (07/27 5268)      No significant interval change since prior examinations  Pulmonary vascular congestion without definite new focal airspace consolidation                           Workstation performed: [id number]               Procedures  ECG 12 Lead Documentation Only    Date/Time: 7/27/2022 12:05 AM  Performed by: Frank Figueroa DO  Authorized by: Frank Figueroa DO     Indications / Diagnosis:  Bradycardia  ECG reviewed by me, the ED Provider: yes    Patient location:  ED  Previous ECG:     Previous ECG:  Compared to current    Comparison ECG info:  Patient had a PVC    Similarity:  Changes noted  Interpretation:     Interpretation: abnormal    Rate:     ECG rate:  65    ECG rate assessment: normal    Rhythm:     Rhythm: atrial fibrillation    Ectopy:     Ectopy: PVCs      PVCs:  Infrequent  QRS:     QRS axis:  Normal    QRS intervals:  Normal  Conduction:     Conduction: normal    ST segments:     ST segments:  Normal  T waves:     T waves: normal    Comments:      Atrial fibrillation with PVC          ED Course  ED Course as of 07/27/22 0919   Wed Jul 27, 2022   0006 I reached out spoke with the general surgery team regarding imaging for this patient and I also requested their evaluation  They recommended this time that he get a CT scan of the abdomen and pelvis with p o  and IV contrast    7872 Patient cannot go to CT scan until 6:00 a m  There is only 1 CT tech working in the emergency department tonight, she cannot leave the emergency department to take the patient to the CT scanner at the [facility], which the patient unfortunately needs due to his body habitus  Once the 2nd tech comes in in the morning he will go to CT scan  6640 I just spoke with the general surgery resident here in the emergency department  Once the patient is taken back for CT scan, I will contact them so that they can do a repeat evaluation prior to turnover to the day shift                                         MDM  Number of Diagnoses or Management Options  Risk of Complications, Morbidity, and/or Mortality  General comments: Leander Brown is a 47year old morbidly obese male presenting for evaluation of 5 days of decreased oral intake secondary to persistent nausea and vomiting, he is also complaining of significant abdominal pain around the site of 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-bowel obstruction that occurred 2 months prior  Patient appeared uncomfortable on examination secondary to pain  He had tenderness to palpation around the area of his enterocutaneous fistula, otherwise physical exam was unremarkable  I immediately contacted General surgery regarding this patient, I asked if and what kind of imaging they would want  They recommended a CT scan with IV and p o  Contrast   Unfortunately given the patient's body habitus, he needed to go to the [address] [place] [address], but we were unable to send to the scanner until the morning given that we only had 1 CT tech in the emergency department this evening  Patient was monitored in the emergency department, pain was moderately controlled with Dilaudid, nausea was well controlled with Zofran  Of note, the patient was found to have elevated CO2 levels, it appeared chronic in nature in that he was not acidotic secondary to metabolic compensation with bicarb  Patient was placed on a CPAP overnight  CT scan of the abdomen did not show any new findings  General surgery evaluated the patient at the bedside, his disposition is pending their disposition, should they want to keep them he will be admitted to their service, should they believe he is stable for discharge, the patient will be discharged  Patient signed out          Patient Progress  Patient progress: stable      Disposition  Final diagnoses:   Nausea and vomiting   Abdominal pain     Time reflects when diagnosis was documented in both MDM as applicable and the Disposition within this note     Time User Action Codes Description Comment    7/27/2022  9:19 AM Dhaval Vanessa Add [R11 2] Nausea and vomiting     7/27/2022  9:19 AM Dhaval Vanessa Add [R10 9] Abdominal pain       ED Disposition     None      Follow-up Information None         Patient's Medications   Discharge Prescriptions    No medications on file     No discharge procedures on file  PDMP Review       Value Time User    PDMP Reviewed  Yes 7/26/2022 11:02 PM Augie Domínguez MD           ED Provider  Attending physically available and evaluated Gato Wu I managed the patient along with the ED Attending      Electronically Signed by         Huyen Lazo DO  07/27/22 4420

## 2022-07-27 NOTE — CONSULTS
Please see consult note done by Lizandro Michaud MD PGY2 from earlier today        Hanna Braden PA-C  7/27/2022 12:53 PM

## 2022-07-27 NOTE — ASSESSMENT & PLAN NOTE
· Body mass index is 71 13 kg/m²     · Will need some dietary modifications as exercise will be difficult for this patient

## 2022-07-27 NOTE — ED ATTENDING ATTESTATION
7/26/2022  ICarlos MD, saw and evaluated the patient  I have discussed the patient with the resident/non-physician practitioner and agree with the resident's/non-physician practitioner's findings, Plan of Care, and MDM as documented in the resident's/non-physician practitioner's note, except where noted  All available labs and Radiology studies were reviewed  I was present for key portions of any procedure(s) performed by the resident/non-physician practitioner and I was immediately available to provide assistance  At this point I agree with the current assessment done in the Emergency Department  I have conducted an independent evaluation of this patient a history and physical is as follows: Patient is a 47year old male with worsening abdominal pain since this past Friday with N/v/D  No fever  No urinary sx  Was [facility] [place] [facility] [place] for sob and abdominal pain  [facility] website checked on this patient and no Rx found  Unable to eat since this past Friday  Had wound I&D and wash out and vac change 7/5/22 and has h/o SBO  Mucous membranes dry  Heart regular  Lungs clear  Abdomen diffusely tender  (+) bowel sounds  Dressing C/D/I  (+) LE edema  DDx including but not limited to: appendicitis, gastroenteritis, gastritis, PUD, GERD, gastroparesis, hepatitis, pancreatitis, colitis, enteritis, diverticulitis, food poisoning, epiploic appendagitis, mesenteric adenitis, mesenteric panniculitis, mesenteric ischemia, IBD, IBS, ileus, bowel obstruction, volvulus, internal hernia, choledocholithiasis, perforated viscus, tumor, splenic etiology, constipation, AAA, renal colic, pyelonephritis, UTI, metabolic abnormality, dehydration; doubt cardiac etiology  Will check labs and x-ray and EKG and consult surgery         ED Course         Critical Care Time  Procedures

## 2022-07-27 NOTE — H&P
[address] 1970, 47 y o  male MRN: [id number]  Unit/Bed#: W -17 Encounter: [id number]  Primary Care Provider: Patti Muniz MD   Date and time admitted to hospital: 7/26/2022 10:55 PM    * Intractable abdominal pain  Assessment & Plan  · Presents today with intractable abdominal pain and nausea with vomiting  Refused to eat in the ER  · No discrete evidence of acute abdominal pathology on CT scan, would continue with conservative management  · General surgery evaluated, recommending a GI consult  · Mental ordered  Clear liquid diet for now  Antiemetics ordered  · Admit for observation    Atrial fibrillation ([facility] )  Assessment & Plan  · Rate controlled with Lopressor  · Continue digoxin and Pradaxa  · Monitor heart rate  Chronic diastolic heart failure [facility])  Assessment & Plan  Wt Readings from Last 3 Encounters:   07/23/22 (!) 231 kg (510 lb)   07/19/22 (!) 228 kg (503 lb)   07/19/22 (!) 229 kg (503 lb 15 5 oz)     · Will hold diuretics for now given poor oral intake  · Resume when able  Chronic respiratory failure with hypoxia (HCC)  Assessment & Plan  · Chronically on 2-4 L secondary to CHF, morbid obesity and hypoventilation syndrome  · Monitor O2 status  No shortness of breath reported presently  Appearing comfortable with no respiratory distress  History of DVT (deep vein thrombosis)  Assessment & Plan  · Continue Pradaxa    S/p small bowel obstruction  Assessment & Plan  · Have lengthy hospitalization in June of 2022 for SBO  · No evidence of SBO on CT scan  · Surgery evaluated, no inpatient surgical recommendations besides medical management  Morbid obesity ([facility] )  Assessment & Plan  · Body mass index is 71 13 kg/m²     · Will need some dietary modifications as exercise will be difficult for this patient    VTE Pharmacologic Prophylaxis: VTE Score: 4 Moderate Risk (Score 3-4) - Pharmacological DVT Prophylaxis Ordered: dabigatran (Pradaxa)  Code Status: Level 1 - Full Code   Discussion with family: Patient declined call to   Anticipated Length of Stay: Patient will be admitted on an observation basis with an anticipated length of stay of less than 2 midnights secondary to abdominal pain, unable to eat  Total Time for Visit, including Counseling / Coordination of Care: 70 minutes Greater than 50% of this total time spent on direct patient counseling and coordination of care  Chief Complaint: abdominal pain    History of Present Illness:  Gina Levy is a 47 y o  male who presents with abdominal pain  Review of Systems:  Review of Systems   Constitutional: Negative for activity change, appetite change, chills, fatigue and fever  HENT: Negative for congestion, rhinorrhea, sinus pressure and sore throat  Eyes: Negative for photophobia, pain and visual disturbance  Respiratory: Negative for cough, shortness of breath and wheezing  Cardiovascular: Negative for chest pain, palpitations and leg swelling  Gastrointestinal: Positive for abdominal pain, nausea and vomiting  Negative for abdominal distention, constipation and diarrhea  Endocrine: Negative for cold intolerance, heat intolerance, polydipsia and polyuria  Genitourinary: Negative for difficulty urinating, dysuria, flank pain, frequency and hematuria  Musculoskeletal: Negative for arthralgias, back pain and joint swelling  Skin: Negative for color change, pallor and rash  Allergic/Immunologic: Negative  Neurological: Negative for dizziness, syncope, weakness, light-headedness and headaches  Hematological: Negative  Psychiatric/Behavioral: Negative          Past Medical and Surgical History:   Past Medical History:   Diagnosis Date    Afib ([place] [address] )     Anemia     Anxiety     Cancer ([place] [address] )     Cardiac disease     Cellulitis     COPD (chronic obstructive pulmonary disease) ([place] [address] )     Depression     Diabetes mellitus ([place] [address] )     DVT (deep venous thrombosis) (HCC)     GERD (gastroesophageal reflux disease)     HBP (high blood pressure)     Heart failure (HCC)     Hx of blood clots     Hypertension     Hypokalemia     Hypothyroid     Obesity     Psychiatric disorder        Past Surgical History:   Procedure Laterality Date    ABDOMINAL HERNIA REPAIR  05/2019    CHOLECYSTECTOMY      Lap    GASTRECTOMY SLEEVE LAPAROSCOPIC  08/2018    INCISION AND DRAINAGE OF WOUND Bilateral 12/01/2021    Procedure: INCISION AND DRAINAGE (I&D) HEAD/FACE;  Surgeon: Abiel Falcon MD;  Location:  MAIN OR;  Service: General    IR PICC PLACEMENT DOUBLE LUMEN  06/13/2022    IVC FILTER INSERTION  2018    KNEE SURGERY Right     open wound, injury     LAPAROTOMY N/A 6/14/2022    Procedure: LAPAROTOMY EXPLORATORY, EXTENSIVE LYSIS OF ADHESIONS, APPLICATION OF ABTHERA WOUND VAC;  Surgeon: Abraham Ashley MD;  Location: [address] [place] [address];  Service: General    LAPAROTOMY N/A 6/15/2022    Procedure: LAPAROTOMY EXPLORATORY WITH REPAIR OF SEROSAL INJURY;  Surgeon: Austyn Coleman MD;  Location:  MAIN OR;  Service: General    LEG SURGERY Right 2009    SPLIT THICKNESS SKIN GRAFT N/A 6/30/2022    Procedure: incision and drainage, wash out vac change;  Surgeon: Smiley Herrera DO;  [facility];  Service: General    SPLIT THICKNESS SKIN GRAFT N/A 7/5/2022    Procedure: PREPERATION RECIPIENT STSG SITE, DEBRIDEMENT NONVIABLE SKIN EDGE; PREPERATION RECIPIENT SKIN GRAFT SITE; APPLICATION OF SKIN SUBSTITUTE TO DONOR SITE; APPLICATION WOUND VAC X2 GREATER THAN 50CM;   Surgeon: Kenan Arellano DO;  [facility];  Service: General    US GUIDED VASCULAR ACCESS  08/06/2018    VAC DRESSING APPLICATION N/A 6/51/7906    Procedure: CHANGE DRESSING/VAC ABDOMEN;  Surgeon: Austyn Coleman MD;  [facility];  Service: General    VAC DRESSING APPLICATION N/A 5/77/4284    Procedure: ABDOMINAL WASHOUT, REPAIR OF SEROSAL TEARS,BRIDING VICRYL MESH, PARTIAL CLOSURE, APPLICATION OF WOUND VAC;  Surgeon: Shaquille Squires DO;  [facility];  Service: General    VAC DRESSING APPLICATION N/A 0/85/8475    Procedure: CHANGE DRESSING/VAC ABDOMEN;  Surgeon: Shaquille Squires DO;  [facility];  Service: General       Meds/Allergies:  Prior to Admission medications    Medication Sig Start Date End Date Taking? Authorizing Provider   acetaminophen (TYLENOL) 325 mg tablet Take 3 tablets (975 mg total) by mouth every 8 (eight) hours 7/19/22   RADHA Dunn   aluminum-magnesium hydroxide-simethicone (MYLANTA) 200-200-20 mg/5 mL suspension Take 30 mL by mouth every 6 (six) hours as needed for indigestion or heartburn 3/18/22   Luiz Ambriz MD   ammonium lactate (LAC-HYDRIN) 12 % cream Apply topically 2 (two) times a day 11/15/21   Adin Toledo MD   budesonide-formoterol [facility]) 160-4 5 mcg/act inhaler Inhale 2 puffs 2 (two) times a day Rinse mouth after use      Historical Provider, MD   buPROPion (FORFIVO XL) 450 MG 24 hr tablet Take 1 tablet (450 mg total) by mouth daily 11/16/21   Adin Toledo MD   Cholecalciferol (VITAMIN D) 50 MCG (2000 UT) tablet Take 2,000 Units by mouth daily    Historical Provider, MD   cyanocobalamin (VITAMIN B-12) 1000 MCG tablet Take 1,000 mcg by mouth daily    Historical Provider, MD   dabigatran etexilate (PRADAXA) 150 mg capsu Take 150 mg by mouth 2 (two) times a day    Historical Provider, MD   dicyclomine (BENTYL) 20 mg tablet Take 1 tablet (20 mg total) by mouth 4 (four) times a day (before meals and at bedtime) 3/18/22   Luiz Ambriz MD   digoxin (LANOXIN) 0 25 mg tablet Take 1 tablet (250 mcg total) by mouth daily 7/20/22   RADHA Dunn   docusate sodium (COLACE) 100 mg capsule Take 1 capsule (100 mg total) by mouth 2 (two) times a day 3/18/22   Luiz Ambriz MD   famotidine (PEPCID) 20 mg tablet Take 1 tablet (20 mg total) by mouth daily 3/19/22 Linda Watkins MD   gabapentin (NEURONTIN) 250 mg/5 mL solution Take 6 mL (300 mg total) by mouth 3 (three) times a day 7/19/22   RADHA Dunn   ipratropium-albuterol (DUO-NEB) 0 5-2 5 mg/3 mL nebulizer solution Take 3 mL by nebulization 3 (three) times a day    Historical Provider, MD   levothyroxine 25 mcg tablet Take 25 mcg by mouth daily    Historical Provider, MD   loratadine (CLARITIN) 10 mg tablet Take 10 mg by mouth daily    Historical Provider, MD   methocarbamol (ROBAXIN) 750 mg tablet Take 1 tablet (750 mg total) by mouth every 6 (six) hours 7/19/22   RADHA Dunn   metoprolol tartrate (LOPRESSOR) 25 mg tablet Take 1 tablet (25 mg total) by mouth every 8 (eight) hours 7/19/22   RADHA Dunn   nystatin (MYCOSTATIN) cream Apply topically 2 (two) times a day  Patient not taking: Reported on 6/6/2022 11/15/21   Teri Cai MD   omeprazole (PriLOSEC) 20 mg delayed release capsule Take 40 mg by mouth daily 5/22/22   Historical Provider, MD   polyethylene glycol (MIRALAX) 17 g packet Take 17 g by mouth daily 3/19/22   Linda Watkins MD   Potassium Chloride (KCL-20 PO) Take 20 mg by mouth in the morning    Historical Provider, MD   QUEtiapine (SEROquel) 50 mg tablet Take 1 tablet (50 mg total) by mouth daily at bedtime 7/19/22   RADHA Dunn   sodium chloride (OCEAN) 0 65 % nasal spray 1 spray into each nostril as needed for congestion 11/15/21   Teri Cai MD   sodium chloride 1 g tablet Take 2 tablets (2 g total) by mouth 3 (three) times a day 7/19/22   RADHA Dunn   spironolactone (ALDACTONE) 50 mg tablet Take 50 mg by mouth daily    Historical Provider, MD   torsemide 40 MG TABS Take 40 mg by mouth 2 (two) times a day 3/18/22   Linda Watkins MD   ondansetron (ZOFRAN) 4 mg tablet Take 1 tablet (4 mg total) by mouth every 8 (eight) hours as needed for nausea or vomiting  Patient not taking: Reported on 6/6/2022 3/18/22 7/27/22 Cherelle Meneses MD   oxyCODONE (ROXICODONE) 5 mg/5 mL solution Take 5 mL (5 mg total) by mouth every 4 (four) hours as needed for moderate pain for up to 10 days Max Daily Amount: 30 mg 7/19/22 7/27/22  RADHA Dunn     I have reviewed home medications with patient personally  Allergies: Allergies   Allergen Reactions    Penicillins Hives       Social History:  Marital Status: Single   Occupation: none  Patient Pre-hospital Living Situation: Home, Skilled Nursing Facility: [address]  Patient Pre-hospital Level of Mobility: unable to be assessed at time of evaluation  Patient Pre-hospital Diet Restrictions: cardiac  Substance Use History:   Social History     Substance and Sexual Activity   Alcohol Use Not Currently     Social History     Tobacco Use   Smoking Status Former Smoker    Packs/day: 1 00    Years: 15 00    Pack years: 15 00   Smokeless Tobacco Never Used   Tobacco Comment    1 5ppd x 23 years, quit 2014     Social History     Substance and Sexual Activity   Drug Use No       Family History:  Family History   Problem Relation Age of Onset    Lung cancer Father     Diabetes Maternal Aunt     Heart attack Mother     Clotting disorder Mother     Hypertension Mother     Heart failure Mother     Diabetes Mother     Atrial fibrillation Mother     Sleep apnea Mother     Obesity Mother     Asthma Sister     Stroke Neg Hx     Anuerysm Neg Hx     Arrhythmia Neg Hx     Hyperlipidemia Neg Hx         pt unsure    Fainting Neg Hx        Physical Exam:     Vitals:   Blood Pressure: (!) 103/46 (07/27/22 1329)  Pulse: 72 (07/27/22 1418)  Temperature: 97 7 °F (36 5 °C) (07/27/22 1329)  Temp Source: Oral (07/27/22 1329)  Respirations: 16 (07/27/22 1329)  Height: 5' 11" (180 3 cm) (07/26/22 2259)  SpO2: 94 % (07/27/22 1418)    Physical Exam  Vitals and nursing note reviewed  Constitutional:       General: He is not in acute distress  Appearance: Normal appearance   He is obese       Interventions: Nasal cannula in place  HENT:      Head: Normocephalic  Mouth/Throat:      Mouth: Mucous membranes are moist    Eyes:      Pupils: Pupils are equal, round, and reactive to light  Cardiovascular:      Rate and Rhythm: Normal rate and regular rhythm  Heart sounds: No murmur heard  Pulmonary:      Effort: Pulmonary effort is normal  No respiratory distress  Breath sounds: Normal breath sounds  No wheezing, rhonchi or rales  Abdominal:      General: Bowel sounds are normal  There is no distension  Palpations: Abdomen is soft  Tenderness: There is abdominal tenderness (diffuse)  There is no guarding  Hernia: No hernia is present  Comments: + EC fistula, greenish yellow drainage  No bleeding   Musculoskeletal:         General: No deformity  Cervical back: Normal range of motion  Right lower leg: Edema present  Left lower leg: Edema present  Skin:     Capillary Refill: Capillary refill takes less than 2 seconds  Neurological:      General: No focal deficit present  Mental Status: He is alert and oriented to person, place, and time  Mental status is at baseline            Additional Data:     Lab Results:  Results from last 7 days   Lab Units 07/26/22  2335   WBC Thousand/uL 4 78   HEMOGLOBIN g/dL 8 1*   HEMATOCRIT % 27 7*   PLATELETS Thousands/uL 236   NEUTROS PCT % 54   LYMPHS PCT % 35   MONOS PCT % 10   EOS PCT % 1     Results from last 7 days   Lab Units 07/26/22  2335 07/23/22  2053   SODIUM mmol/L 135 135   POTASSIUM mmol/L 4 0 3 7   CHLORIDE mmol/L 82* 91*   CO2 mmol/L >45* 40*   BUN mg/dL 8 8   CREATININE mg/dL 0 68 0 65   ANION GAP mmol/L  --  4   CALCIUM mg/dL 7 8* 8 5   ALBUMIN g/dL 2 5* 2 5*   TOTAL BILIRUBIN mg/dL 1 12* 0 71   ALK PHOS U/L 107* 124*   ALT U/L 13 11   AST U/L 49* 15   GLUCOSE RANDOM mg/dL 96 113             Results from last 7 days   Lab Units 07/25/22  0705   HEMOGLOBIN A1C % 5 2           Imaging: Reviewed radiology reports from this admission including: abdominal/pelvic CT  CT abdomen pelvis with contrast   Final Result by Pia Diaz MD (07/27 6413)         1  Technically limited study  2   Postoperative change as noted with large ventral abdominal wall hernia containing loops of large and small bowel without evidence for strangulation or obstruction  Ventral abdominal wall wound appears dehiscent with presumed Abthera in place and    findings consistent with known enterocutaneous fistula  3   Additional findings as noted  Workstation performed: [id number]         XR chest 1 view portable   ED Interpretation by Dhaval Lafleur DO (07/27 0115)   Cardiomegaly, no acute abnormalities, similar to prior      Final Result by Bailey Sarabia MD (07/27 5600)      No significant interval change since prior examinations  Pulmonary vascular congestion without definite new focal airspace consolidation  Workstation performed: [id number]             EKG and Other Studies Reviewed on Admission:   · EKG: No EKG obtained  ** Please Note: This note has been constructed using a voice recognition system   **

## 2022-07-27 NOTE — CONSULTS
Consultation - General Surgery   General Luis 47 y o  male MRN: [id number]  Unit/Bed#: ED 10 Encounter: [id number]    Assessment/Plan     Assessment:  55yo M with extensive surgical history for and SBO resulting in multiple ex laps and lysis of adhesions, repair of serosal injuries, and eventual closure with STSG on 07/05  Patient presents today with emesis and intermittent abdominal pain  Per review of CT scan patient does not appear to be obstructed, plus he is having flatus and bowel movements  It is unclear what the cause of his emesis is, however on review of CT scan his stomach does not appear to be dilated, or suggesting anything associated with a gastric outlet obstruction  The patient is afebrile and hemodynamically stable with no signs of intra-abdominal infection  The GC fistula continues to put out green liquid/semi solid output, and is covered by a wound manager  There does appear to be some slight purulence at his donor site on his left thigh, however the purulence is easily removed with a wet cloth, and there are no signs of any deep tissue or systemic infections present      Plan:  - no acute surgical intervention at this time  - patient may benefit from medical admit for management of comorbidities and rehydration from his multiple episodes of vomiting diarrhea  - recommend GI consult for evaluation of his it with is and possible enteritis  - continue local wound care for fistula and abdominal wounds  - keep left thigh graft site open to air  - will follow    History of Present Illness     HPI:  General Smith is a 47 y o  male with a history of multiple abdominal surgeries after a small bowel obstruction resulting in an exploratory laparotomy with extensive lysis of adhesions and wound VAC placement on 6/14, repair of multiple serosal tears on 06/15, placement of Vicryl mesh with partial closure on 06/17, drainage of abdominal abscess cavity on 06/30, and split thickness skin graft on 07/05, PMH also includes s/p sleeve gastrectomy 2018, s/p lap cholecystectomy, AFib, COPD, DM, DVT, HTN, heart failure, hypothyroid  Patient presents with complaints of at least 5 days of nausea and vomiting with decreased p o  Intake  Patient states that since his initial surgery on 06/14 he has been experiencing intermittent episodes of abdominal pain with frequent bouts of nausea and vomiting  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 which has not changed in output character or volume  He was recently seen and R Jovan Pineda on 07/23 for similar complaints and was discharged without admission  Today he returns with complaints of generalized but nonspecific abdominal pain  He says he has not been able to eat anything for the pain as 5 days due to his nausea  He is still having flatus and describes diarrhea  He denies any fevers, chills, sick contacts  A CT scan was performed showing no signs of obstruction  Consults    Review of Systems   Constitutional: Positive for activity change and appetite change  Negative for fever  HENT: Negative  Eyes: Negative  Respiratory: Positive for shortness of breath  Cardiovascular: Negative  Gastrointestinal: Positive for abdominal pain, diarrhea, nausea and vomiting  Negative for abdominal distention and blood in stool  Endocrine: Negative  Genitourinary: Negative  Musculoskeletal: Negative  Neurological: Negative  Psychiatric/Behavioral: Negative          Historical Information   Past Medical History:   Diagnosis Date    Afib ([place] [address] )     Anemia     Anxiety     Cancer ([place] [address] )     Cardiac disease     Cellulitis     COPD (chronic obstructive pulmonary disease) ([place] [address] )     Depression     Diabetes mellitus ([place] [address] )     DVT (deep venous thrombosis) (HCC)     GERD (gastroesophageal reflux disease)     HBP (high blood pressure)     Heart failure (HCC)     Hx of blood clots     Hypertension     Hypokalemia     Hypothyroid  Obesity     Psychiatric disorder      Past Surgical History:   Procedure Laterality Date    ABDOMINAL HERNIA REPAIR  05/2019    CHOLECYSTECTOMY      Lap    GASTRECTOMY SLEEVE LAPAROSCOPIC  08/2018    INCISION AND DRAINAGE OF WOUND Bilateral 12/01/2021    Procedure: INCISION AND DRAINAGE (I&D) HEAD/FACE;  Surgeon: Destiny Cohen MD;  Location:  MAIN OR;  Service: General    IR PICC PLACEMENT DOUBLE LUMEN  06/13/2022    IVC FILTER INSERTION  2018    KNEE SURGERY Right     open wound, injury     LAPAROTOMY N/A 6/14/2022    Procedure: LAPAROTOMY EXPLORATORY, EXTENSIVE LYSIS OF ADHESIONS, APPLICATION OF ABTHERA WOUND VAC;  Surgeon: Jyothi Georges MD;  [facility];  Service: General    LAPAROTOMY N/A 6/15/2022    Procedure: LAPAROTOMY EXPLORATORY WITH REPAIR OF SEROSAL INJURY;  Surgeon: Farhana Nath MD;  Location:  MAIN OR;  Service: General    LEG SURGERY Right 2009    SPLIT THICKNESS SKIN GRAFT N/A 6/30/2022    Procedure: incision and drainage, wash out vac change;  Surgeon: Rakan Ignacio DO;  Location:  MAIN OR;  Service: General    SPLIT THICKNESS SKIN GRAFT N/A 7/5/2022    Procedure: PREPERATION RECIPIENT STSG SITE, DEBRIDEMENT NONVIABLE SKIN EDGE; PREPERATION RECIPIENT SKIN GRAFT SITE; APPLICATION OF SKIN SUBSTITUTE TO DONOR SITE; APPLICATION WOUND VAC X2 GREATER THAN 50CM;   Surgeon: Radha Ring DO;  [facility];  Service: General    US GUIDED VASCULAR ACCESS  08/06/2018    VAC DRESSING APPLICATION N/A 0/63/2186    Procedure: CHANGE DRESSING/VAC ABDOMEN;  Surgeon: Farhana Nath MD;  [facility];  Service: General    VAC DRESSING APPLICATION N/A 3/47/2090    Procedure: ABDOMINAL WASHOUT, REPAIR OF SEROSAL TEARS,BRIDING VICRYL MESH, PARTIAL CLOSURE, APPLICATION OF WOUND VAC;  Surgeon: Rakan Ignacio DO;  [facility];  Service: General    VAC DRESSING APPLICATION N/A 3/40/3799    Procedure: CHANGE DRESSING/VAC ABDOMEN; Surgeon: Alber Chinchilla DO;  [facility];  Service: General     Social History   Social History     Substance and Sexual Activity   Alcohol Use Not Currently     Social History     Substance and Sexual Activity   Drug Use No     E-Cigarette/Vaping    E-Cigarette Use Never User      E-Cigarette/Vaping Substances    Nicotine No     THC No     CBD No     Flavoring No     Other No     Unknown No      Social History     Tobacco Use   Smoking Status Former Smoker    Packs/day: 1 00    Years: 15 00    Pack years: 15 00   Smokeless Tobacco Never Used   Tobacco Comment    1 5ppd x 23 years, quit 2014     Family History:   Family History   Problem Relation Age of Onset   [facility] Lung cancer Father     Diabetes Maternal Aunt     Heart attack Mother     Clotting disorder Mother     Hypertension Mother     Heart failure Mother     Diabetes Mother     Atrial fibrillation Mother     Sleep apnea Mother     Obesity Mother     Asthma Sister     Stroke Neg Hx     Anuerysm Neg Hx     Arrhythmia Neg Hx     Hyperlipidemia Neg Hx         pt unsure    Fainting Neg Hx        Meds/Allergies   current meds:   No current facility-administered medications for this encounter  and PTA meds:   Prior to Admission Medications   Prescriptions Last Dose Informant Patient Reported? Taking?    Cholecalciferol (VITAMIN D) 50 MCG (2000 UT) tablet  Outside Facility (Specify) Yes No   Sig: Take 2,000 Units by mouth daily   Potassium Chloride (KCL-20 PO)   Yes No   Sig: Take 20 mg by mouth in the morning   QUEtiapine (SEROquel) 50 mg tablet   No No   Sig: Take 1 tablet (50 mg total) by mouth daily at bedtime   acetaminophen (TYLENOL) 325 mg tablet   No No   Sig: Take 3 tablets (975 mg total) by mouth every 8 (eight) hours   aluminum-magnesium hydroxide-simethicone (MYLANTA) 200-200-20 mg/5 mL suspension   No No   Sig: Take 30 mL by mouth every 6 (six) hours as needed for indigestion or heartburn   ammonium lactate (LAC-HYDRIN) 12 % cream   No No   Sig: Apply topically 2 (two) times a day   buPROPion (FORFIVO XL) 450 MG 24 hr tablet   No No   Sig: Take 1 tablet (450 mg total) by mouth daily   budesonide-formoterol (SYMBICORT) 160-4 5 mcg/act inhaler   Yes No   Sig: Inhale 2 puffs 2 (two) times a day Rinse mouth after use     cyanocobalamin (VITAMIN B-12) 1000 MCG tablet  Outside Facility (Specify) Yes No   Sig: Take 1,000 mcg by mouth daily   dabigatran etexilate (PRADAXA) 150 mg capsu  Outside Facility (Specify) Yes No   Sig: Take 150 mg by mouth 2 (two) times a day   dicyclomine (BENTYL) 20 mg tablet   No No   Sig: Take 1 tablet (20 mg total) by mouth 4 (four) times a day (before meals and at bedtime)   digoxin (LANOXIN) 0 25 mg tablet   No No   Sig: Take 1 tablet (250 mcg total) by mouth daily   docusate sodium (COLACE) 100 mg capsule   No No   Sig: Take 1 capsule (100 mg total) by mouth 2 (two) times a day   famotidine (PEPCID) 20 mg tablet   No No   Sig: Take 1 tablet (20 mg total) by mouth daily   gabapentin (NEURONTIN) 250 mg/5 mL solution   No No   Sig: Take 6 mL (300 mg total) by mouth 3 (three) times a day   ipratropium-albuterol (DUO-NEB) 0 5-2 5 mg/3 mL nebulizer solution  Outside Facility (Specify) Yes No   Sig: Take 3 mL by nebulization 3 (three) times a day   levothyroxine 25 mcg tablet  Outside Facility (Specify) Yes No   Sig: Take 25 mcg by mouth daily   loratadine (CLARITIN) 10 mg tablet   Yes No   Sig: Take 10 mg by mouth daily   methocarbamol (ROBAXIN) 750 mg tablet   No No   Sig: Take 1 tablet (750 mg total) by mouth every 6 (six) hours   metoprolol tartrate (LOPRESSOR) 25 mg tablet   No No   Sig: Take 1 tablet (25 mg total) by mouth every 8 (eight) hours   nystatin (MYCOSTATIN) cream   No No   Sig: Apply topically 2 (two) times a day   Patient not taking: Reported on 6/6/2022   omeprazole (PriLOSEC) 20 mg delayed release capsule   Yes No   Sig: Take 40 mg by mouth daily   ondansetron (ZOFRAN) 4 mg tablet No No   Sig: Take 1 tablet (4 mg total) by mouth every 8 (eight) hours as needed for nausea or vomiting   Patient not taking: Reported on 2022   oxyCODONE (ROXICODONE) 5 mg/5 mL solution   No No   Sig: Take 5 mL (5 mg total) by mouth every 4 (four) hours as needed for moderate pain for up to 10 days Max Daily Amount: 30 mg   polyethylene glycol (MIRALAX) 17 g packet   No No   Sig: Take 17 g by mouth daily   sodium chloride (OCEAN) 0 65 % nasal spray   No No   Si spray into each nostril as needed for congestion   sodium chloride 1 g tablet   No No   Sig: Take 2 tablets (2 g total) by mouth 3 (three) times a day   spironolactone (ALDACTONE) 50 mg tablet  Outside Facility (Specify) Yes No   Sig: Take 50 mg by mouth daily   torsemide 40 MG TABS   No No   Sig: Take 40 mg by mouth 2 (two) times a day      Facility-Administered Medications: None     Allergies   Allergen Reactions    Penicillins Hives       Objective   First Vitals:   Blood Pressure: (!) 105/45 (22)  Pulse: 66 (22)  Temperature: 97 5 °F (36 4 °C) (22)  Temp Source: Oral (22)  Respirations: 20 (22)  Height: 5' 11" (180 3 cm) (22)  SpO2: 100 % (22)    Current Vitals:   Blood Pressure: 111/51 (22)  Pulse: 72 (22)  Temperature: 97 5 °F (36 4 °C) (22)  Temp Source: Oral (22)  Respirations: 20 (22)  Height: 5' 11" (180 3 cm) (22)  SpO2: 99 % (22)    No intake or output data in the 24 hours ending 22 1014    Invasive Devices  Report    Peripheral Intravenous Line  Duration           Peripheral IV 22 Left Antecubital <1 day          Drain  Duration           Open Drain Medial RUQ 12 days                Physical Exam  Constitutional:       Appearance: He is obese  HENT:      Head: Normocephalic and atraumatic        Mouth/Throat:      Mouth: Mucous membranes are moist    Eyes:      Pupils: Pupils are equal, round, and reactive to light  Cardiovascular:      Rate and Rhythm: Normal rate and regular rhythm  Pulses: Normal pulses  Pulmonary:      Effort: Pulmonary effort is normal       Comments: On 2 L nasal cannula  Chest:      Chest wall: No tenderness  Abdominal:      General: There is no distension  Palpations: Abdomen is soft  Tenderness: There is no abdominal tenderness  There is no guarding  Comments: Patient is morbidly obese  Abdominal closure site of SCS she healing well  Graft to epidermal edges are intact  No signs of inflammation or skin separation  EC fistula orifice is approximately 2 cm  Fistula with greenish output  Small area of abdomen at inferior portion of the wound packed with plain gauze  No purulence, erythema, or tenderness noted  Overall abdomen is nontender on exam      Musculoskeletal:         General: Normal range of motion  Skin:     General: Skin is warm and dry  Capillary Refill: Capillary refill takes less than 2 seconds  Neurological:      General: No focal deficit present  Mental Status: He is alert  Lab Results:   I have personally reviewed pertinent lab results    , CBC:   Lab Results   Component Value Date    WBC 4 78 07/26/2022    HGB 8 1 (L) 07/26/2022    HCT 27 7 (L) 07/26/2022    MCV 97 07/26/2022     07/26/2022    MCH 28 4 07/26/2022    MCHC 29 2 (L) 07/26/2022    RDW 17 5 (H) 07/26/2022    MPV 9 4 07/26/2022    NRBC 0 07/26/2022   , CMP:   Lab Results   Component Value Date    SODIUM 135 07/26/2022    K 4 0 07/26/2022    CL 82 (L) 07/26/2022    CO2 >45 (HH) 07/26/2022    BUN 8 07/26/2022    CREATININE 0 68 07/26/2022    CALCIUM 7 8 (L) 07/26/2022    AST 49 (H) 07/26/2022    ALT 13 07/26/2022    ALKPHOS 107 (H) 07/26/2022    EGFR 110 07/26/2022   , Coagulation: No results found for: PT, INR, APTT, Lipase:   Lab Results   Component Value Date    LIPASE 18 07/26/2022     Imaging: I have personally reviewed pertinent reports  CT abdomen pelvis with contrast   Final Result by Orville Eugene MD (07/27 9519)         1  Technically limited study  2   Postoperative change as noted with large ventral abdominal wall hernia containing loops of large and small bowel without evidence for strangulation or obstruction  Ventral abdominal wall wound appears dehiscent with presumed Abthera in place and    findings consistent with known enterocutaneous fistula  3   Additional findings as noted  Workstation performed: [id number]         XR chest 1 view portable   ED Interpretation by Dhaval Mahan DO (07/27 0115)   Cardiomegaly, no acute abnormalities, similar to prior      Final Result by Heri Lara MD (07/27 0193)      No significant interval change since prior examinations  Pulmonary vascular congestion without definite new focal airspace consolidation  Workstation performed: [id number]             EKG, Pathology, and Other Studies: I have personally reviewed pertinent reports  Counseling / Coordination of Care  Total floor / unit time spent today 30 minutes  Greater than 50% of total time was spent with the patient and / or family counseling and / or coordination of care    A description of the counseling / coordination of care: N/A

## 2022-07-27 NOTE — ED NOTES
Per provider, patient can come off CPAP for intake of contrast       [address] Ocampo RN  07/27/22 3906

## 2022-07-27 NOTE — ASSESSMENT & PLAN NOTE
· Presents today with intractable abdominal pain and nausea with vomiting  Refused to eat in the ER  · No discrete evidence of acute abdominal pathology on CT scan, would continue with conservative management  · General surgery evaluated, recommending a GI consult  · Mental ordered  Clear liquid diet for now  Antiemetics ordered    · Admit for observation

## 2022-07-27 NOTE — PLAN OF CARE
Problem: GASTROINTESTINAL - ADULT  Goal: Minimal or absence of nausea and/or vomiting  Description: INTERVENTIONS:  - Administer IV fluids if ordered to ensure adequate hydration  - Maintain NPO status until nausea and vomiting are resolved  - Nasogastric tube if ordered  - Administer ordered antiemetic medications as needed  - Provide nonpharmacologic comfort measures as appropriate  - Advance diet as tolerated, if ordered  - Consider nutrition services referral to assist patient with adequate nutrition and appropriate food choices  Outcome: Progressing  Goal: Maintains or returns to baseline bowel function  Description: INTERVENTIONS:  - Assess bowel function  - Encourage oral fluids to ensure adequate hydration  - Administer IV fluids if ordered to ensure adequate hydration  - Administer ordered medications as needed  - Encourage mobilization and activity  - Consider nutritional services referral to assist patient with adequate nutrition and appropriate food choices  Outcome: Progressing  Goal: Maintains adequate nutritional intake  Description: INTERVENTIONS:  - Monitor percentage of each meal consumed  - Identify factors contributing to decreased intake, treat as appropriate  - Assist with meals as needed  - Monitor I&O, weight, and lab values if indicated  - Obtain nutrition services referral as needed  Outcome: Progressing  Goal: Establish and maintain optimal ostomy function  Description: INTERVENTIONS:  - Assess bowel function  - Encourage oral fluids to ensure adequate hydration  - Administer IV fluids if ordered to ensure adequate hydration   - Administer ordered medications as needed  - Encourage mobilization and activity  - Nutrition services referral to assist patient with appropriate food choices  - Assess stoma site  - Consider wound care consult   Outcome: Progressing  Goal: Oral mucous membranes remain intact  Description: INTERVENTIONS  - Assess oral mucosa and hygiene practices  - Implement preventative oral hygiene regimen  - Implement oral medicated treatments as ordered  - Initiate Nutrition services referral as needed  Outcome: Progressing     Problem: SKIN/TISSUE INTEGRITY - ADULT  Goal: Skin Integrity remains intact(Skin Breakdown Prevention)  Description: Assess:  -Perform Arnoldo assessment every   -Clean and moisturize skin every   -Inspect skin when repositioning, toileting, and assisting with ADLS  -Assess under medical devices such as  every   -Assess extremities for adequate circulation and sensation     Bed Management:  -Have minimal linens on bed & keep smooth, unwrinkled  -Change linens as needed when moist or perspiring  -Avoid sitting or lying in one position for more than  hours while in bed  -Keep HOB at degrees     Toileting:  -Offer bedside commode  -Assess for incontinence every   -Use incontinent care products after each incontinent episode such as     Activity:  -Mobilize patient  times a day  -Encourage activity and walks on unit  -Encourage or provide ROM exercises   -Turn and reposition patient every  Hours  -Use appropriate equipment to lift or move patient in bed  -Instruct/ Assist with weight shifting every  when out of bed in chair  -Consider limitation of chair time  hour intervals    Skin Care:  -Avoid use of baby powder, tape, friction and shearing, hot water or constrictive clothing  -Relieve pressure over bony prominences using   -Do not massage red bony areas    Next Steps:  -Teach patient strategies to minimize risks such as    -Consider consults to  interdisciplinary teams such as   Outcome: Progressing  Goal: Incision(s), wounds(s) or drain site(s) healing without S/S of infection  Description: INTERVENTIONS  - Assess and document dressing, incision, wound bed, drain sites and surrounding tissue  - Provide patient and family education  - Perform skin care/dressing changes every   Outcome: Progressing  Goal: Pressure injury heals and does not worsen  Description: Interventions:  - Implement low air loss mattress or specialty surface (Criteria met)  - Apply silicone foam dressing  - Instruct/assist with weight shifting every  minutes when in chair   - Limit chair time to  hour intervals  - Use special pressure reducing interventions such as  when in chair   - Apply fecal or urinary incontinence containment device   - Perform passive or active ROM every   - Turn and reposition patient & offload bony prominences every  hours   - Utilize friction reducing device or surface for transfers   - Consider consults to  interdisciplinary teams such as   - Use incontinent care products after each incontinent episode such a  - Consider nutrition services referral as needed  Outcome: Progressing     Problem: MUSCULOSKELETAL - ADULT  Goal: Maintain or return mobility to safest level of function  Description: INTERVENTIONS:  - Assess patient's ability to carry out ADLs; assess patient's baseline for ADL function and identify physical deficits which impact ability to perform ADLs (bathing, care of mouth/teeth, toileting, grooming, dressing, etc )  - Assess/evaluate cause of self-care deficits   - Assess range of motion  - Assess patient's mobility  - Assess patient's need for assistive devices and provide as appropriate  - Encourage maximum independence but intervene and supervise when necessary  - Involve family in performance of ADLs  - Assess for home care needs following discharge   - Consider OT consult to assist with ADL evaluation and planning for discharge  - Provide patient education as appropriate  Outcome: Progressing  Goal: Maintain proper alignment of affected body part  Description: INTERVENTIONS:  - Support, maintain and protect limb and body alignment  - Provide patient/ family with appropriate education  Outcome: Progressing

## 2022-07-27 NOTE — ASSESSMENT & PLAN NOTE
· Have lengthy hospitalization in June of 2022 for SBO  · No evidence of SBO on CT scan  · Surgery evaluated, no inpatient surgical recommendations besides medical management

## 2022-07-27 NOTE — ASSESSMENT & PLAN NOTE
· Chronically on 2-4 L secondary to CHF, morbid obesity and hypoventilation syndrome  · Monitor O2 status  No shortness of breath reported presently  Appearing comfortable with no respiratory distress

## 2022-07-28 LAB
BUN SERPL-MCNC: 8 MG/DL (ref 5–25)
CALCIUM SERPL-MCNC: 8.3 MG/DL (ref 8.4–10.2)
CHLORIDE SERPL-SCNC: 83 MMOL/L (ref 96–108)
CO2 SERPL-SCNC: >45 MMOL/L (ref 21–32)
CREAT SERPL-MCNC: 0.59 MG/DL (ref 0.6–1.3)
GFR SERPL CREATININE-BSD FRML MDRD: 117 ML/MIN/1.73SQ M
GLUCOSE SERPL-MCNC: 93 MG/DL (ref 65–140)
MAGNESIUM SERPL-MCNC: 1.8 MG/DL (ref 1.9–2.7)
POTASSIUM SERPL-SCNC: 3.1 MMOL/L (ref 3.5–5.3)
SODIUM SERPL-SCNC: 134 MMOL/L (ref 135–147)

## 2022-07-28 PROCEDURE — 99226 PR SBSQ OBSERVATION CARE/DAY 35 MINUTES: CPT | Performed by: PHYSICIAN ASSISTANT

## 2022-07-28 PROCEDURE — 99244 OFF/OP CNSLTJ NEW/EST MOD 40: CPT | Performed by: SURGERY

## 2022-07-28 PROCEDURE — 80048 BASIC METABOLIC PNL TOTAL CA: CPT | Performed by: PHYSICIAN ASSISTANT

## 2022-07-28 PROCEDURE — 94760 N-INVAS EAR/PLS OXIMETRY 1: CPT

## 2022-07-28 PROCEDURE — 99244 OFF/OP CNSLTJ NEW/EST MOD 40: CPT | Performed by: INTERNAL MEDICINE

## 2022-07-28 PROCEDURE — 83735 ASSAY OF MAGNESIUM: CPT | Performed by: PHYSICIAN ASSISTANT

## 2022-07-28 PROCEDURE — NC001 PR NO CHARGE: Performed by: SURGERY

## 2022-07-28 PROCEDURE — 94640 AIRWAY INHALATION TREATMENT: CPT

## 2022-07-28 PROCEDURE — C9113 INJ PANTOPRAZOLE SODIUM, VIA: HCPCS | Performed by: INTERNAL MEDICINE

## 2022-07-28 RX ORDER — OXYCODONE HYDROCHLORIDE 10 MG/1
10 TABLET ORAL EVERY 4 HOURS PRN
Status: DISCONTINUED | OUTPATIENT
Start: 2022-07-28 | End: 2022-08-03 | Stop reason: HOSPADM

## 2022-07-28 RX ORDER — OXYCODONE HYDROCHLORIDE 5 MG/1
5 TABLET ORAL EVERY 4 HOURS PRN
Status: DISCONTINUED | OUTPATIENT
Start: 2022-07-28 | End: 2022-08-03 | Stop reason: HOSPADM

## 2022-07-28 RX ORDER — HYDROMORPHONE HCL/PF 1 MG/ML
1 SYRINGE (ML) INJECTION EVERY 4 HOURS PRN
Status: DISCONTINUED | OUTPATIENT
Start: 2022-07-28 | End: 2022-08-03 | Stop reason: HOSPADM

## 2022-07-28 RX ORDER — PROMETHAZINE HYDROCHLORIDE 25 MG/ML
12.5 INJECTION, SOLUTION INTRAMUSCULAR; INTRAVENOUS EVERY 8 HOURS
Status: DISCONTINUED | OUTPATIENT
Start: 2022-07-28 | End: 2022-07-29

## 2022-07-28 RX ORDER — POTASSIUM CHLORIDE 14.9 MG/ML
20 INJECTION INTRAVENOUS ONCE
Status: COMPLETED | OUTPATIENT
Start: 2022-07-28 | End: 2022-07-28

## 2022-07-28 RX ORDER — BUPROPION HYDROCHLORIDE 150 MG/1
150 TABLET ORAL DAILY
Status: DISCONTINUED | OUTPATIENT
Start: 2022-07-29 | End: 2022-08-03 | Stop reason: HOSPADM

## 2022-07-28 RX ADMIN — FAMOTIDINE 20 MG: 20 TABLET, FILM COATED ORAL at 09:08

## 2022-07-28 RX ADMIN — SODIUM CHLORIDE, SODIUM GLUCONATE, SODIUM ACETATE, POTASSIUM CHLORIDE, MAGNESIUM CHLORIDE, SODIUM PHOSPHATE, DIBASIC, AND POTASSIUM PHOSPHATE 75 ML/HR: .53; .5; .37; .037; .03; .012; .00082 INJECTION, SOLUTION INTRAVENOUS at 14:46

## 2022-07-28 RX ADMIN — ONDANSETRON 4 MG: 2 INJECTION INTRAMUSCULAR; INTRAVENOUS at 11:09

## 2022-07-28 RX ADMIN — HYDROMORPHONE HYDROCHLORIDE 1 MG: 1 INJECTION, SOLUTION INTRAMUSCULAR; INTRAVENOUS; SUBCUTANEOUS at 19:29

## 2022-07-28 RX ADMIN — IPRATROPIUM BROMIDE 0.5 MG: 0.5 SOLUTION RESPIRATORY (INHALATION) at 19:40

## 2022-07-28 RX ADMIN — LEVOTHYROXINE SODIUM 25 MCG: 25 TABLET ORAL at 06:34

## 2022-07-28 RX ADMIN — IPRATROPIUM BROMIDE 0.5 MG: 0.5 SOLUTION RESPIRATORY (INHALATION) at 14:21

## 2022-07-28 RX ADMIN — LEVALBUTEROL HYDROCHLORIDE 1.25 MG: 1.25 SOLUTION, CONCENTRATE RESPIRATORY (INHALATION) at 14:21

## 2022-07-28 RX ADMIN — LEVALBUTEROL HYDROCHLORIDE 1.25 MG: 1.25 SOLUTION, CONCENTRATE RESPIRATORY (INHALATION) at 08:03

## 2022-07-28 RX ADMIN — PANTOPRAZOLE SODIUM 40 MG: 40 INJECTION, POWDER, FOR SOLUTION INTRAVENOUS at 08:57

## 2022-07-28 RX ADMIN — PROMETHAZINE HYDROCHLORIDE 12.5 MG: 25 INJECTION INTRAMUSCULAR; INTRAVENOUS at 22:59

## 2022-07-28 RX ADMIN — BUDESONIDE AND FORMOTEROL FUMARATE DIHYDRATE 2 PUFF: 160; 4.5 AEROSOL RESPIRATORY (INHALATION) at 09:06

## 2022-07-28 RX ADMIN — SODIUM CHLORIDE, SODIUM GLUCONATE, SODIUM ACETATE, POTASSIUM CHLORIDE, MAGNESIUM CHLORIDE, SODIUM PHOSPHATE, DIBASIC, AND POTASSIUM PHOSPHATE 75 ML/HR: .53; .5; .37; .037; .03; .012; .00082 INJECTION, SOLUTION INTRAVENOUS at 05:56

## 2022-07-28 RX ADMIN — METHOCARBAMOL 750 MG: 750 TABLET ORAL at 17:55

## 2022-07-28 RX ADMIN — POTASSIUM CHLORIDE 20 MEQ: 14.9 INJECTION, SOLUTION INTRAVENOUS at 12:12

## 2022-07-28 RX ADMIN — DICYCLOMINE HYDROCHLORIDE 20 MG: 20 TABLET ORAL at 12:28

## 2022-07-28 RX ADMIN — PANTOPRAZOLE SODIUM 40 MG: 40 INJECTION, POWDER, FOR SOLUTION INTRAVENOUS at 22:15

## 2022-07-28 RX ADMIN — LEVALBUTEROL HYDROCHLORIDE 1.25 MG: 1.25 SOLUTION, CONCENTRATE RESPIRATORY (INHALATION) at 19:39

## 2022-07-28 RX ADMIN — PROMETHAZINE HYDROCHLORIDE 12.5 MG: 25 INJECTION INTRAMUSCULAR; INTRAVENOUS at 16:17

## 2022-07-28 RX ADMIN — IPRATROPIUM BROMIDE 0.5 MG: 0.5 SOLUTION RESPIRATORY (INHALATION) at 08:03

## 2022-07-28 RX ADMIN — BUDESONIDE AND FORMOTEROL FUMARATE DIHYDRATE 2 PUFF: 160; 4.5 AEROSOL RESPIRATORY (INHALATION) at 17:57

## 2022-07-28 RX ADMIN — DICYCLOMINE HYDROCHLORIDE 20 MG: 20 TABLET ORAL at 06:34

## 2022-07-28 NOTE — UTILIZATION REVIEW
Initial Clinical Review  OBSERVATION  7/27/22 @1142 CONVERTED TO INPATIENT ADMISSION 7/29/22 @ 1354 DUE TO CONTINUED STAY REQUIRED TO EVALUATE AND TREAT PATIENT WITH ONGOING NAUSEA , ABDOMINAL PAIN WITH IVF, INCREASED SCHEDULED IV ANTIEMETICS , POTENTIAL NEED FOR PICC LINE VS SURGICAL INTERVENTION TO MEET NUTRITION NEEDS  GI AND GENERAL SURGERY FOLLOWING  Admission: Date/Time/Statement:   Admission Orders (From admission, onward)     Ordered        07/29/22 1354  Inpatient Admission  Once            07/27/22 1142  Place in Observation  Once                      Orders Placed This Encounter   Procedures    Inpatient Admission     Standing Status:   Standing     Number of Occurrences:   1     Order Specific Question:   Level of Care     Answer:   Med Surg [16]     Order Specific Question:   Estimated length of stay     Answer:   More than 2 Midnights     Order Specific Question:   Certification     Answer:   I certify that inpatient services are medically necessary for this patient for a duration of greater than two midnights  See H&P and MD Progress Notes for additional information about the patient's course of treatment  ED Arrival Information     Expected   -    Arrival   7/26/2022 22:55    Acuity   Urgent            Means of arrival   Ambulance    Escorted by   [facility]    Service   Hospitalist    Admission type   Urgent            Arrival complaint   [facility] Pt           Chief Complaint   Patient presents with    Abdominal Pain     Pt comes from [address] via EMS  Reports ABD pain, n/v/d since abdominal surgery 1 month ago  Denies fevers          Initial Presentation: 47 y o  male  h/o,SBO 06/2022 resulting in multiple abdominal surgeries eventual closure with STSG on 07/05  ,chronic abdominal pain, EC fistula, DVT on Pradaxa, chronic resp failure on O2 @ 2-4 L, CHF, MO, hypoventilation syndrome, A fib presenting with intractable nausea and vomiting, abdominal pain, and inability to tolerate PO  On exam, diffuse abdominal tenderness,abdomen soft, non distended , normal bowel sounds   + EC fistula, greenish yellow drainage  No bleeding   BLE edema   Pt refused to eat in ED> Labs-Hgb 8 1, Co2 >45, T bili 1 12   CT A/P w/o discrete evidence of acute pathology  Pt given IVF, IV analgesics, IV antiemetic in ED  Pt admitted as OBS with intractable abdominal pain, N/V-possible gastritis  Plan - General surgery consult, GI consult, CL diet, antemetics, pain control, start PPI IV, gentle IVF  General surgery consult-pt having flatus, moving bowels  CT  A/P-pt does not appear to be obstructed  Unclear cause of his emesis is,   CT scan - stomach does not appear to be dilated, or suggesting anything associated with a gastric outlet obstruction  Afebrile and hemodynamically stable ,no signs of intra-abdominal infection  The GC fistula continues to put out green liquid/semi solid output, and is covered by a wound manager  Slight purulence at his donor site on his left thigh, however the purulence is easily removed with a wet cloth, and there are no signs of any deep tissue or systemic infections present  No acute surgical intervention  Rehydrate , recommend GI consult, local wound care to fistula and abdominal wounds  L thigh open to air   Wt Readings from Last 3 Encounters:   07/23/22 (!) 231 kg (510 lb)   07/19/22 (!) 228 kg (503 lb)   07/19/22 (!) 229 kg (503 lb 15 5 oz)       Date: 7/28  gen'l surgery-Abdominal pain 7/10 per nursing notes  Pt reports tender at [facility] site and L thigh  Abdomen soft, non distended  EC fistula with approximately 100 cc output in the bag  Fistula orifice approximately 2 cm with greenish brown liquid output  Abdominal skin graft site is pink and well perfused with clean edges  No surrounding erythema  Left thigh skin graft donor site as well as abdominal skin graft recipient site have slight presence purulence without any signs of surrounding erythema to suggest spreading or deep infection  Nausea is controlled with prn antiemetics Continue CL diet, pain and nausea control, spirometry, OOB ambulation  Continue local wound care with daily soap and water cleansing of the wound and application of dry dressing  monitor fistula output with wound manager  IVF  GI consult-no evidence of bowel obstruction, has good output, passing flatus  Recommend standing antiemetics with Phenergan every 8 hours , Zofran PRN, CL diet, gradually advance  PPI BID, can consider adding carafate Suspect underlying dysmotility versus bezoar as seen on prior endoscopy in 03/2022  Medicine- still complaining of intractable nausea  Only eat a few bites of aitalian ice yesterday  Still dry heaving this morning  Complaining of generalized abdominal pain  Bentyl ordered  Date 7/29 Converted to IP  Gen'l surgery- EC fistula with minimal formed stool  Pt on CL w/ Ensure, tolerating little po, reports nausea slightly improved with med additions by GI yesterday, no vomiting  Passing flatus, having bm's   Abdomen soft, non distended   L thigh drsg changed   Potassium and mag low today  Per nursing , abdominal pain 7/10 this am , pt received prn IV narcotic analgesic for pain   IVF continues  GI-pt reports has not had any vomiting this morning but generally will vomit or feel like vomiting with all oral intake  Nauseated   Has been reported with minimal food intake today  Patient reports passing flatus and having bowel movements  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 noted with no bleeding/melena  Increased Phenergan dose to 25 mg q6h  Consider trila reglan failing phenergan, Encourage po intake  Continue CL diet for now, advance as tolerates  Per form calorie count  Time meal times with antiemetic   Continue IV PPI BID  Medicine-Has been refusing to eat and nauseous and dry heaving  Continue scheduled phenergan per GI   Pt essentially has been NPO, continue CL diet   may need to consider temporary nutrition via PICC line versus surgical intervention  Reports continued abdominal pain   K 3 1 today  IVF changed to  K added   Ness Rebel CMP tomorrow with mag  Nutrition -PO Kcal/fluids negligible x 8 days  Malnutrition risk  Is receiving IVF  Would prefer to trial EN distal to fistula through fistula opening if appliance will accommodate     j tube initiate Osmolite 1 2 @20 ml/hr  If he tolerates, would then advance toward goal or 100 ml/hr  If he does not tolerate or feeding distal to fistula not possible, or unable to tolerate PO by Monday, will need to initiate PN for nutrition support  Obtain T bili and current wt   Date 7/30 day 2  gen'lSurgery-Pt reports abdominal pain is the same as yesterday  Pt receiving prn IV and prn po analgesics for pain He endorses nausea but denies vomiting and fevers  Pt receiving scheduled promethazine   He does endorse chills and is currently passing flatus without difficulty  He is producing urine and there is minimal output from his ostomy  L leg wound producing green purulent fluid  Pt afebrile  Local Wound Care to STSG site and abdominal wound  Medicine- Abdominal pain is about the same  Still with nausea  No vomiting  Has UOP and passing flatus  Added Reglan today  Pt has hx QTC prolongation, ordered ECG   K 3 4 today, Mag 3 1  Continue to trend CMP and mag  IVF continues      ED Triage Vitals   Temperature Pulse Respirations Blood Pressure SpO2   07/26/22 2259 07/26/22 2259 07/26/22 2259 07/26/22 2259 07/26/22 2259   97 5 °F (36 4 °C) 66 20 (!) 105/45 100 %      Temp Source Heart Rate Source Patient Position - Orthostatic VS BP Location FiO2 (%)   07/26/22 2259 07/26/22 2259 07/26/22 2259 07/26/22 2259 07/28/22 0422   Oral Monitor Sitting Right arm 30      Pain Score       07/26/22 2259       8          Wt Readings from Last 1 Encounters:   07/23/22 (!) 231 kg (510 lb)     Additional Vital Signs:   Date/Time Temp Pulse Resp BP MAP (mmHg) SpO2 FiO2 (%) Calculated FIO2 (%) - Nasal Cannula O2 Flow Rate (L/min) Nasal Cannula O2 Flow Rate (L/min) O2 Device O2 Interface Device   07/30/22 0759 -- -- -- -- -- 95 % -- 32 -- 3 L/min Nasal cannula --   07/30/22 0700 98 5 °F (36 9 °C) 76 16 118/47 Abnormal  71 94 % -- -- -- -- -- --   07/30/22 0500 -- -- -- -- -- 100 % -- -- -- -- -- Face mask   07/30/22 0218 -- -- -- 108/56 -- -- -- -- -- -- -- --   07/29/22 2356 -- -- -- -- -- 96 % -- -- -- -- -- Face mask   07/29/22 1955 -- -- -- 105/60 -- 95 % -- 28 2 L/min 2 L/min Nasal cannula --   07/29/22 15:39:55 97 3 °F (36 3 °C) Abnormal  78 18 103/43 Abnormal  63 Abnormal  98 % -- -- -- -- -- --       Date/Time Temp Pulse Resp BP MAP (mmHg) SpO2 FiO2 (%) Calculated FIO2 (%) - Nasal Cannula O2 Flow Rate (L/min) Nasal Cannula O2 Flow Rate (L/min) O2 Device O2 Interface Device   07/29/22 07:29:51 98 2 °F (36 8 °C) 82 18 113/59 77 100 % -- -- -- -- -- --   07/29/22 0721 -- -- -- -- -- 100 % 28 28 2 L/min 2 L/min Nasal cannula --   07/29/22 0410 -- -- -- -- -- 99 % 30 -- -- -- CPAP Face mask   07/29/22 0036 -- -- -- -- -- 97 % 30 -- -- -- CPAP Full face mask   07/28/22 21:22:39 97 9 °F (36 6 °C) 67 18 108/53 71 92 % -- -- -- -- -- --   07/28/22 1941 -- -- -- -- -- 98 % -- 28 -- 2 L/min Nasal cannula --   07/28/22 15:01:41 97 8 °F (36 6 °C) 73 17 102/41 Abnormal  61 Abnormal  100 % -- -- -- -- -- --   07/28/22 1421 -- -- -- -- -- 98 % -- 28 -- 2 L/min Nasal cannula --   07/28/22 0745 -- -- -- -- -- 100 % -- 28 -- 2 L/min Nasal cannula --       Date/Time Temp Pulse Resp BP MAP (mmHg) SpO2 FiO2 (%) Calculated FIO2 (%) - Nasal Cannula Nasal Cannula O2 Flow Rate (L/min) O2 Device O2 Interface Device   07/28/22 07:18:25 97 7 °F (36 5 °C) 77 18 117/33 Abnormal  61 Abnormal  96 % -- -- -- -- --   07/28/22 0643 -- 47 Abnormal  -- 108/62 -- -- -- -- -- -- --   07/28/22 0422 -- 65 25 Abnormal  -- -- 100 % 30 -- -- BiPAP Full face mask   07/27/22 8600 -- -- -- -- -- 97 % -- -- -- -- Full face mask   07/27/22 21:51:37 -- 79 -- 122/67 85 97 % -- -- -- -- --   07/27/22 2151 97 3 °F (36 3 °C) Abnormal  44 Abnormal  -- 122/67 85 97 % -- -- -- -- --   07/27/22 2128 -- -- -- -- -- 99 % -- -- -- -- --   07/27/22 2103 -- -- -- -- -- 96 % -- 28 2 L/min Nasal cannula --   07/27/22 15:05:32 -- 67 19 93/42 Abnormal  59 Abnormal  99 % -- -- -- -- --   07/27/22 1418 -- 72 -- -- -- 94 % -- 28 2 L/min Nasal cannula --   07/27/22 13:29:29 97 7 °F (36 5 °C) 65 16 103/46 Abnormal  65 99 % -- 44 6 L/min Nasal cannula --   07/27/22 1305 -- 86 20 123/60 -- 100 % -- 44 6 L/min Nasal cannula --   07/27/22 1200 -- 80 20 111/51 73 100 % -- 28 2 L/min Nasal cannula --   07/27/22 0400 -- 72 20 111/51 74 99 % -- 28 2 L/min Nasal cannula --   07/27/22 0218 -- -- -- -- -- -- -- -- -- Nasal cannula --   07/27/22 0200 -- 82 22 115/45 Abnormal  65 100 % -- 28 2 L/min Nasal cannula --       Pertinent Labs/Diagnostic Test Results:   CT abdomen pelvis with contrast   Final Result by Gayle Guzman MD (07/27 0734)         1  Technically limited study  2   Postoperative change as noted with large ventral abdominal wall hernia containing loops of large and small bowel without evidence for strangulation or obstruction  Ventral abdominal wall wound appears dehiscent with presumed Abthera in place and    findings consistent with known enterocutaneous fistula  3   Additional findings as noted  Workstation performed: [id number]         XR chest 1 view portable   ED Interpretation by Chance Abby Coleman DO (07/27 0115)   Cardiomegaly, no acute abnormalities, similar to prior      Final Result by Kenroy Christensen MD (07/27 3340)      No significant interval change since prior examinations  Pulmonary vascular congestion without definite new focal airspace consolidation                           Workstation performed: [id number]         7/26 ECG-Atrial fibrillation, V rate 65  Low voltage QRS  Nonspecific ST and T wave abnormality     Results from last 7 days   Lab Units 07/23/22 2117   SARS-COV-2  Negative     Results from last 7 days   Lab Units 07/30/22  0936 07/26/22 2335 07/23/22 2053   WBC Thousand/uL 4 55 4 78 5 67   HEMOGLOBIN g/dL 8 0* 8 1* 8 5*   HEMATOCRIT % 28 7* 27 7* 29 0*   PLATELETS Thousands/uL 203 236 233   NEUTROS ABS Thousands/µL 2 37 2 55 3 02         Results from last 7 days   Lab Units 07/30/22  0936 07/29/22  0933 07/28/22 0457 07/26/22 2335 07/23/22 2053   SODIUM mmol/L 135 135 134* 135 135   POTASSIUM mmol/L 3 4* 3 1* 3 1* 4 0 3 7   CHLORIDE mmol/L 85* 84* 83* 82* 91*   CO2 mmol/L >45* >45* >45* >45* 40*   ANION GAP mmol/L  --   --   --   --  4   BUN mg/dL 6 6 8 8 8   CREATININE mg/dL 0 61 0 56* 0 59* 0 68 0 65   EGFR ml/min/1 73sq m 115 119 117 110 112   CALCIUM mg/dL 8 3* 8 1* 8 3* 7 8* 8 5   MAGNESIUM mg/dL 2 1  --  1 8*  --   --    PHOSPHORUS mg/dL 2 7  --   --   --   --      Results from last 7 days   Lab Units 07/30/22  0936 07/26/22 2335 07/23/22 2053   AST U/L 30 49* 15   ALT U/L 12 13 11   ALK PHOS U/L 120* 107* 124*   TOTAL PROTEIN g/dL 6 4 6 7 6 6   ALBUMIN g/dL 2 4* 2 5* 2 5*   TOTAL BILIRUBIN mg/dL 1 35* 1 12* 0 71   BILIRUBIN DIRECT mg/dL  --   --  0 29*         Results from last 7 days   Lab Units 07/30/22  0936 07/29/22  0933 07/28/22 0457 07/26/22 2335 07/23/22 2053   GLUCOSE RANDOM mg/dL 132 100 93 96 113         Results from last 7 days   Lab Units 07/25/22  0705   HEMOGLOBIN A1C % 5 2   EAG mg/dL 103            Results from last 7 days   Lab Units 07/27/22  0042   PH TRACI  7 378   PCO2 TRACI mm Hg 87 6*   PO2 TRACI mm Hg 30 8*   HCO3 TRACI mmol/L 50 4*   BASE EXC TRACI mmol/L 21 9   O2 CONTENT TRACI ml/dL 7 2   O2 HGB, VENOUS % 55 0*               Results from last 7 days   Lab Units 07/26/22  2335 07/23/22  2053   LIPASE u/L 18 16                     Results from last 7 days   Lab Units 07/23/22  2117   INFLUENZA A PCR  Negative   INFLUENZA B PCR  Negative   RSV PCR  Negative ED Treatment:   Medication Administration from 07/26/2022 2255 to 07/27/2022 1324       Date/Time Order Dose Route Action     07/27/2022 0020 ondansetron (ZOFRAN) injection 4 mg 4 mg Intravenous Given     07/27/2022 0019 HYDROmorphone (DILAUDID) injection 1 mg 1 mg Intravenous Given     07/27/2022 0020 sodium chloride 0 9 % bolus 1,000 mL 1,000 mL Intravenous New Bag     07/27/2022 0350 iohexol (OMNIPAQUE) 240 MG/ML solution 50 mL 50 mL Oral Given     07/27/2022 0448 HYDROmorphone (DILAUDID) injection 0 5 mg 0 5 mg Intravenous Given     07/27/2022 0627 iohexol (OMNIPAQUE) 350 MG/ML injection (SINGLE-DOSE) 100 mL 100 mL Intravenous Given     07/27/2022 0948 ipratropium-albuterol (DUO-NEB) 0 5-2 5 mg/3 mL inhalation solution 3 mL 3 mL Nebulization Given     07/27/2022 0948 HYDROmorphone (DILAUDID) injection 0 5 mg 0 5 mg Intravenous Given        Past Medical History:   Diagnosis Date    Afib ([place] [address] )     Anemia     Anxiety     Cancer ([place] [address] )     Cardiac disease     Cellulitis     COPD (chronic obstructive pulmonary disease) ([place] [address] )     Depression     Diabetes mellitus ([place] [address] )     [facility] venous thrombosis) [facility])     GERD (gastroesophageal reflux disease)     HBP (high blood pressure)     Heart failure ([place] [address] )     Hx of blood clots     Hypertension     Hypokalemia     Hypothyroid     Obesity     Psychiatric disorder      Present on Admission:   Morbid obesity ([facility] )   Intractable abdominal pain   Chronic respiratory failure with hypoxia (HCC)   Atrial fibrillation (HCC)   Chronic diastolic heart failure (HCC)   QT prolongation      Admitting Diagnosis: Abdominal pain [R10 9]  Nausea and vomiting [R11 2]  Age/Sex: 47 y o  male  Admission Orders:  Scheduled Medications:  acetaminophen, 975 mg, Oral, Q8H [facility]  budesonide-formoterol, 2 puff, Inhalation, BID  buPROPion, 150 mg, Oral, Daily  dicyclomine, 20 mg, Oral, 4x Daily (AC & HS)  digoxin, 250 mcg, Intravenous, Daily  docusate sodium, 100 mg, Oral, BID  famotidine, 20 mg, Intravenous, Q12H MARY  fondaparinux, 10 mg, Subcutaneous, Q24H  gabapentin, 300 mg, Oral, TID  ipratropium, 0 5 mg, Nebulization, TID  levalbuterol, 1 25 mg, Nebulization, TID  levothyroxine, 25 mcg, Oral, Early Morning  methocarbamol, 750 mg, Oral, Q6H MARY  metoclopramide, 10 mg, Intravenous, Q6H [address]  metoprolol, 5 mg, Intravenous, Q8H  pantoprazole, 40 mg, Intravenous, Q12H [address]  polyethylene glycol, 17 g, Oral, Daily  promethazine, 25 mg, Intravenous, Q8H  QUEtiapine, 50 mg, Oral, HS  sodium chloride, 2 g, Oral, TID    promethazine (PHENERGAN) injection 12 5 mg  Dose: 12 5 mg  Freq: Every 8 hours Route: IV  Start: 07/28/22 1430 End: 07/29/22 1332  Continuous IV Infusions:  multi-electrolyte, 75 mL/hr, Intravenous, ContinuousEnd: 07/29/22 1012  dextrose 5 % and sodium chloride 0 9 % with KCl 20 mEq/L infusion (premix)  Rate: 75 mL/hr Dose: 75 mL/hr  Freq: Continuous Route: IV  Last Dose: 75 mL/hr (07/29/22 1415)  Start: 07/29/22 1400    PRN Meds:  aluminum-magnesium hydroxide-simethicone, 30 mL, Oral, Q6H PRN  ondansetron, 4 mg, Intravenous, Q4H PRN x2 7/27, x1 7/28  oxyCODONE, 5 mg, Oral, Q4H PRN x1 7/27,  x1 7/30  sodium chloride, 1 spray, Each Nare, Q1H PRN  HYDROmorphone (DILAUDID) injection 1 mg  Dose: 1 mg  Freq: Every 4 hours PRN Route: IV  PRN Reasons: severe pain,breakthrough pain  Start: 07/28/22 1856  x1 7/28, x4 7/29 x2 7/30  oxyCODONE (ROXICODONE) immediate release tablet 10 mg  Dose: 10 mg  Freq: Every 4 hours PRN Route: PO  PRN Reason: severe pain  Start: 07/28/22 0848      SCD   OOB as radhames  Midline abdomen- daily change dry drsg  CL diet  CPAP at HS    IP CONSULT TO ACUTE CARE SURGERY  IP CONSULT TO GASTROENTEROLOGY  IP CONSULT TO NUTRITION SERVICES    Network Utilization Review Department  ATTENTION: Please call with any questions or concerns to [phone] and carefully listen to the prompts so that you are directed to the right person   All voicemails are confidential   Evelyn Ruiz all requests for admission clinical reviews, approved or denied determinations and any other requests to dedicated fax number below belonging to the campus where the patient is receiving treatment   List of dedicated fax numbers for the Facilities:  [address] DENIALS (Administrative/Medical Necessity) [phone]   [address] (Maternity/NICU/Pediatrics) [phone]   [address]  [address] [place] [address] [address] [place] [address] [address] [facility] [phone]

## 2022-07-28 NOTE — QUICK NOTE
Called and update patient's significant other Natalia Holcomb)  Contact information to be updated in the chart

## 2022-07-28 NOTE — PLAN OF CARE
Problem: GASTROINTESTINAL - ADULT  Goal: Minimal or absence of nausea and/or vomiting  Description: INTERVENTIONS:  - Administer IV fluids if ordered to ensure adequate hydration  - Maintain NPO status until nausea and vomiting are resolved  - Nasogastric tube if ordered  - Administer ordered antiemetic medications as needed  - Provide nonpharmacologic comfort measures as appropriate  - Advance diet as tolerated, if ordered  - Consider nutrition services referral to assist patient with adequate nutrition and appropriate food choices  Outcome: Progressing  Goal: Maintains or returns to baseline bowel function  Description: INTERVENTIONS:  - Assess bowel function  - Encourage oral fluids to ensure adequate hydration  - Administer IV fluids if ordered to ensure adequate hydration  - Administer ordered medications as needed  - Encourage mobilization and activity  - Consider nutritional services referral to assist patient with adequate nutrition and appropriate food choices  Outcome: Progressing  Goal: Maintains adequate nutritional intake  Description: INTERVENTIONS:  - Monitor percentage of each meal consumed  - Identify factors contributing to decreased intake, treat as appropriate  - Assist with meals as needed  - Monitor I&O, weight, and lab values if indicated  - Obtain nutrition services referral as needed  Outcome: Progressing  Goal: Establish and maintain optimal ostomy function  Description: INTERVENTIONS:  - Assess bowel function  - Encourage oral fluids to ensure adequate hydration  - Administer IV fluids if ordered to ensure adequate hydration   - Administer ordered medications as needed  - Encourage mobilization and activity  - Nutrition services referral to assist patient with appropriate food choices  - Assess stoma site  - Consider wound care consult   Outcome: Progressing  Goal: Oral mucous membranes remain intact  Description: INTERVENTIONS  - Assess oral mucosa and hygiene practices  - Implement preventative oral hygiene regimen  - Implement oral medicated treatments as ordered  - Initiate Nutrition services referral as needed  Outcome: Progressing     Problem: SKIN/TISSUE INTEGRITY - ADULT  Goal: Skin Integrity remains intact(Skin Breakdown Prevention)  Description: Assess:  -Perform Arnoldo assessment every   -Clean and moisturize skin every   -Inspect skin when repositioning, toileting, and assisting with ADLS  -Assess under medical devices such as  every   -Assess extremities for adequate circulation and sensation     Bed Management:  -Have minimal linens on bed & keep smooth, unwrinkled  -Change linens as needed when moist or perspiring  -Avoid sitting or lying in one position for more than  hours while in bed  -Keep HOB at degrees     Toileting:  -Offer bedside commode  -Assess for incontinence every   -Use incontinent care products after each incontinent episode such as     Activity:  -Mobilize patient  times a day  -Encourage activity and walks on unit  -Encourage or provide ROM exercises   -Turn and reposition patient every  Hours  -Use appropriate equipment to lift or move patient in bed  -Instruct/ Assist with weight shifting every  when out of bed in chair  -Consider limitation of chair time  hour intervals    Skin Care:  -Avoid use of baby powder, tape, friction and shearing, hot water or constrictive clothing  -Relieve pressure over bony prominences using   -Do not massage red bony areas    Next Steps:  -Teach patient strategies to minimize risks such as    -Consider consults to  interdisciplinary teams such a  Outcome: Progressing  Goal: Incision(s), wounds(s) or drain site(s) healing without S/S of infection  Description: INTERVENTIONS  - Assess and document dressing, incision, wound bed, drain sites and surrounding tissue  - Provide patient and family education  - Perform skin care/dressing changes every  Outcome: Progressing  Goal: Pressure injury heals and does not worsen  Description: Interventions:  - Implement low air loss mattress or specialty surface (Criteria met)  - Apply silicone foam dressing  - Instruct/assist with weight shifting every  minutes when in chair   - Limit chair time to  hour intervals  - Use special pressure reducing interventions such as  when in chair   - Apply fecal or urinary incontinence containment device   - Perform passive or active ROM every   - Turn and reposition patient & offload bony prominences every  hours   - Utilize friction reducing device or surface for transfers   - Consider consults to  interdisciplinary teams such as   - Use incontinent care products after each incontinent episode such   - Consider nutrition services referral as needed  Outcome: Progressing     Problem: MUSCULOSKELETAL - ADULT  Goal: Maintain or return mobility to safest level of function  Description: INTERVENTIONS:  - Assess patient's ability to carry out ADLs; assess patient's baseline for ADL function and identify physical deficits which impact ability to perform ADLs (bathing, care of mouth/teeth, toileting, grooming, dressing, etc )  - Assess/evaluate cause of self-care deficits   - Assess range of motion  - Assess patient's mobility  - Assess patient's need for assistive devices and provide as appropriate  - Encourage maximum independence but intervene and supervise when necessary  - Involve family in performance of ADLs  - Assess for home care needs following discharge   - Consider OT consult to assist with ADL evaluation and planning for discharge  - Provide patient education as appropriate  Outcome: Progressing  Goal: Maintain proper alignment of affected body part  Description: INTERVENTIONS:  - Support, maintain and protect limb and body alignment  - Provide patient/ family with appropriate education  Outcome: Progressing     Problem: MOBILITY - ADULT  Goal: Maintain or return to baseline ADL function  Description: INTERVENTIONS:  -  Assess patient's ability to carry out ADLs; assess patient's baseline for ADL function and identify physical deficits which impact ability to perform ADLs (bathing, care of mouth/teeth, toileting, grooming, dressing, etc )  - Assess/evaluate cause of self-care deficits   - Assess range of motion  - Assess patient's mobility; develop plan if impaired  - Assess patient's need for assistive devices and provide as appropriate  - Encourage maximum independence but intervene and supervise when necessary  - Involve family in performance of ADLs  - Assess for home care needs following discharge   - Consider OT consult to assist with ADL evaluation and planning for discharge  - Provide patient education as appropriate  Outcome: Progressing  Goal: Maintains/Returns to pre admission functional level  Description: INTERVENTIONS:  - Perform BMAT or MOVE assessment daily    - Set and communicate daily mobility goal to care team and patient/family/caregiver  - Collaborate with rehabilitation services on mobility goals if consulted  - Perform Range of Motion  times a day  - Reposition patient every  hours  - Dangle patient  times a day  - Stand patient  times a day  - Ambulate patient  times a day  - Out of bed to chair  times a day   - Out of bed for meal times a day  - Out of bed for toileting  - Record patient progress and toleration of activity level   Outcome: Progressing     Problem: Nutrition/Hydration-ADULT  Goal: Nutrient/Hydration intake appropriate for improving, restoring or maintaining nutritional needs  Description: Monitor and assess patient's nutrition/hydration status for malnutrition  Collaborate with interdisciplinary team and initiate plan and interventions as ordered  Monitor patient's weight and dietary intake as ordered or per policy  Utilize nutrition screening tool and intervene as necessary  Determine patient's food preferences and provide high-protein, high-caloric foods as appropriate       INTERVENTIONS:  - Monitor oral intake, urinary output, labs, and treatment plans  - Assess nutrition and hydration status and recommend course of action  - Evaluate amount of meals eaten  - Assist patient with eating if necessary   - Allow adequate time for meals  - Recommend/ encourage appropriate diets, oral nutritional supplements, and vitamin/mineral supplements  - Order, calculate, and assess calorie counts as needed  - Recommend, monitor, and adjust tube feedings and TPN/PPN based on assessed needs  - Assess need for intravenous fluids  - Provide specific nutrition/hydration education as appropriate  - Include patient/family/caregiver in decisions related to nutrition  Outcome: Progressing     Problem: Potential for Falls  Goal: Patient will remain free of falls  Description: INTERVENTIONS:  - Educate patient/family on patient safety including physical limitations  - Instruct patient to call for assistance with activity   - Consult OT/PT to assist with strengthening/mobility   - Keep Call bell within reach  - Keep bed low and locked with side rails adjusted as appropriate  - Keep care items and personal belongings within reach  - Initiate and maintain comfort rounds  - Make Fall Risk Sign visible to staff  - Offer Toileting every  Hours, in advance of need  - Initiate/Maintain alarm  - Obtain necessary fall risk management equipment  - Apply yellow socks and bracelet for high fall risk patients  - Consider moving patient to room near nurses station  Outcome: Progressing     Problem: Prexisting or High Potential for Compromised Skin Integrity  Goal: Skin integrity is maintained or improved  Description: INTERVENTIONS:  - Identify patients at risk for skin breakdown  - Assess and monitor skin integrity  - Assess and monitor nutrition and hydration status  - Monitor labs   - Assess for incontinence   - Turn and reposition patient  - Assist with mobility/ambulation  - Relieve pressure over bony prominences  - Avoid friction and shearing  - Provide appropriate hygiene as needed including keeping skin clean and dry  - Evaluate need for skin moisturizer/barrier cream  - Collaborate with interdisciplinary team   - Patient/family teaching  - Consider wound care consult   Outcome: Progressing

## 2022-07-28 NOTE — CASE MANAGEMENT
Case Management Assessment & Discharge Planning Note    Patient name Courtney Wahl  Location W /W -86 MRN [id number]  : 1970 Date 2022       Current Admission Date: 2022  Current Admission Diagnosis:Intractable abdominal pain   Patient Active Problem List    Diagnosis Date Noted    Hyponatremia 2022    Acute respiratory failure ([address] ) 2022    Hypertension 2022    Diabetes mellitus ([address] ) 2022    Depression 2022    Intractable abdominal pain 2022    Epidermoid cyst of neck 2022    Hypotension 2021    Chronic respiratory failure with hypoxia ([address] ) 2021    Cellulitis of multiple sites of head and neck 2021    Elevated troponin 2021    History of DVT (deep vein thrombosis) 2021    Positive blood culture 2021    QT prolongation 2021    COPD (chronic obstructive pulmonary disease) ([address] ) 05/10/2021    Hypothyroidism 05/10/2021    Medical non-compliance 2017    Atrial fibrillation ([address] ) 2017    Foot pain 11/15/2016    Knee pain 11/15/2016    S/p small bowel obstruction 2016    Recurrent bronchospasm 10/16/2016    Venous stasis dermatitis of both lower extremities 10/16/2016    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    Obesity hypoventilation syndrome ([address] ) 2016    Chronic diastolic heart failure (HCC) 2016    MRSA (methicillin resistant Staphylococcus aureus) Tracheobronchitis 2016    Atrial fibrillation with rapid ventricular response ([address] ) 08/15/2016    Morbid obesity ([address] ) 08/15/2016    Tremor 08/15/2016      LOS (days): 0  Geometric Mean LOS (GMLOS) (days):   Days to GMLOS:     OBJECTIVE:              Current admission status: Observation       Preferred Pharmacy:   [address] [place] [address], [address]  [address]  [facility] [address]  Phone: [phone] Fax: [phone]    Primary Care Provider: Gustavo Barber MD    Primary Insurance: [id number]  Secondary Insurance:     ASSESSMENT:  [address], [address] Representative - Significant Other   Primary Phone: [phone] (Mobile)  Home Phone: [phone]                         Readmission Root Cause  30 Day Readmission: No    Patient Information  Admitted from[de-identified] Facility  Mental Status: Alert  During Assessment patient was accompanied by: Not accompanied during assessment  Assessment information provided by[de-identified] Patient  Primary Caregiver: Other (Comment) (Facility)  Caregiver's Name[de-identified] Karis [facility] Relationship to Patient[de-identified] Other (Specify) (facility staff)  Support Systems: Spouse/significant other  [place] of Residence: [address] [place] [address] do you live in?: [place]  Type of Current Residence: Facility  Living Arrangements: Other (Comment) ([facility])    Activities of Daily Living Prior to Admission  Functional Status: Total dependent  Completes ADLs independently?: No  Level of ADL dependence: Total Dependent  Ambulates independently?: No  Level of ambulatory dependence: Total Dependent  Does patient use assisted devices?: Yes  Assisted Devices (DME) used: [employer] Limber lift, Other (Comment) (Oxygen)         Patient Information Continued  Income Source: [employer] of Transport to [email or web address][de-identified] Other (Comment) (Facility)        DISCHARGE DETAILS:    Discharge planning discussed with[de-identified] Pt  Freedom of Choice: Yes  Comments - Freedom of Choice: Pt wishes to return to his facility of [facility]is [facility] at [facility]  CM contacted family/caregiver?: No- see comments (Pt refused)             Contacts  Patient Contacts: self  Relationship to Patient[de-identified] Other (Comment) (self)  Reason/Outcome: Discharge Planning              Other Referral/Resources/Interventions Provided:  Interventions: Facility Return  Referral Comments: CM placed a referral via [address] for pt's return to facility    CM was requested by the pt to have a [facility] bed    CM TT MD of pt's request                Transport at Discharge : [facility] (will need two days to obtain)

## 2022-07-28 NOTE — ASSESSMENT & PLAN NOTE
· Had lengthy hospitalization in June of 2022 for SBO  · No evidence of SBO on CT scan  · Surgery evaluated, no inpatient surgical recommendations besides medical management

## 2022-07-28 NOTE — ASSESSMENT & PLAN NOTE
· Presents today with intractable abdominal pain and nausea with vomiting  Refused to eat in the ER  · No discrete evidence of acute abdominal pathology on CT scan, would continue with conservative management  · General surgery evaluated, recommending a GI consult  · Barrett ordered  Clear liquid diet for now  Antiemetics ordered    · ? EGD

## 2022-07-28 NOTE — CONSULTS
Consultation -  [facility]  Gato Wu 47 y o  male MRN: [id number]  Unit/Bed#: W -00 Encounter: [id number]        Inpatient consult to gastroenterology  Consult performed by: Yasmine Giordano PA-C  Consult ordered by: Dawn Coyne PA-C          Reason for Consult / Principal Problem:  Nausea and vomiting    HPI: Gato Wu is a 47y o  year old male with multiple medical problems, BMI 71 1 with history of COPD chronically on 2-4 L O2 via NC, AFib, DVT anticoagulated with Pradaxa who was admitted to the hospital 2 days ago after presenting with complaint of approximately 5 days of persistent nausea and vomiting with difficulty tolerating solids and liquids by mouth  He was also complaining of abdominal pain around the site of a known enterocutaneous fistula that developed following a small bowel obstruction for which he was treated 2 months prior  He had extensive surgical history including multiple ex laps and lysis of adhesions  He has a drain in place over this fistula  He is also noted with history of sleeve gastrectomy, as well as cholecystectomy  His CT scan here showed a known large ventral abdominal wall hernia containing loops of large and small bowel, other findings were consistent with his known history of enterocutaneous fistula, but otherwise there was no evidence of bowel strangulation or obstruction, or any other obvious acute pathology  Liver enzymes appeared generally unremarkable, lipase normal, no significant leukocytosis noted  Patient tells me his main problem at this point is is ongoing nausea, which he 1st tells me to a coli happens with eating, although he tells me he is also feeling nauseous at this time, but will get worse with attempting to eat or drink anything  Denies any hematemesis, any melena or hematochezia  No fevers or chills at this time      Notably he had EGD in March showing retained gastric food bezoar, patient is suspected to have gastroparesis  REVIEW OF SYSTEMS:    CONSTITUTIONAL: Denies any fever, chills, or rigors  Good appetite, and no recent weight loss  HEENT: No earache or tinnitus  Denies hearing loss or visual disturbances  CARDIOVASCULAR: No chest pain or palpitations  RESPIRATORY: Denies any cough, hemoptysis, shortness of breath or dyspnea on exertion  GASTROINTESTINAL: As noted in the History of Present Illness  GENITOURINARY: No problems with urination  Denies any hematuria or dysuria  NEUROLOGIC: No dizziness or vertigo, denies headaches  MUSCULOSKELETAL: Denies any muscle or joint pain  SKIN: Denies skin rashes or itching  ENDOCRINE: Denies excessive thirst  Denies intolerance to heat or cold  PSYCHOSOCIAL: Denies depression or anxiety  Denies any recent memory loss         Historical Information   Past Medical History:   Diagnosis Date    Afib ([place] [address] )     Anemia     Anxiety     Cancer ([place] [address] )     Cardiac disease     Cellulitis     COPD (chronic obstructive pulmonary disease) ([place] [address] )     Depression     Diabetes mellitus ([place] [address] )     DVT (deep venous thrombosis) (HCC)     GERD (gastroesophageal reflux disease)     HBP (high blood pressure)     Heart failure (HCC)     Hx of blood clots     Hypertension     Hypokalemia     Hypothyroid     Obesity     Psychiatric disorder      Past Surgical History:   Procedure Laterality Date    ABDOMINAL HERNIA REPAIR  05/2019    CHOLECYSTECTOMY      Lap    GASTRECTOMY SLEEVE LAPAROSCOPIC  08/2018    INCISION AND DRAINAGE OF WOUND Bilateral 12/01/2021    Procedure: INCISION AND DRAINAGE (I&D) HEAD/FACE;  Surgeon: Chrissie Sullivan MD;  Location:  MAIN OR;  Service: General    IR PICC PLACEMENT DOUBLE LUMEN  06/13/2022    IVC FILTER INSERTION  2018    KNEE SURGERY Right     open wound, injury     LAPAROTOMY N/A 6/14/2022    Procedure: LAPAROTOMY EXPLORATORY, EXTENSIVE LYSIS OF ADHESIONS, APPLICATION OF ABTHERA WOUND VAC;  Surgeon: Stacie Gan MD; Location: [address] [place] [address];  Service: General    LAPAROTOMY N/A 6/15/2022    Procedure: LAPAROTOMY EXPLORATORY WITH REPAIR OF SEROSAL INJURY;  Surgeon: Verner Hatchet, MD;  [facility];  Service: General    LEG SURGERY Right 2009    SPLIT THICKNESS SKIN GRAFT N/A 6/30/2022    Procedure: incision and drainage, wash out vac change;  Surgeon: Laisha Ewing DO;  [facility];  Service: General    SPLIT THICKNESS SKIN GRAFT N/A 7/5/2022    Procedure: PREPERATION RECIPIENT STSG SITE, DEBRIDEMENT NONVIABLE SKIN EDGE; PREPERATION RECIPIENT SKIN GRAFT SITE; APPLICATION OF SKIN SUBSTITUTE TO DONOR SITE; APPLICATION WOUND VAC X2 GREATER THAN 50CM;   Surgeon: Kerry Arroyo DO;  [facility];  Service: General    US GUIDED VASCULAR ACCESS  08/06/2018    VAC DRESSING APPLICATION N/A 4/78/6435    Procedure: CHANGE DRESSING/VAC ABDOMEN;  Surgeon: Verner Hatchet, MD;  [facility];  Service: General    VAC DRESSING APPLICATION N/A 1/29/9079    Procedure: ABDOMINAL WASHOUT, REPAIR OF SEROSAL TEARS,BRIDING VICRYL MESH, PARTIAL CLOSURE, APPLICATION OF WOUND VAC;  Surgeon: Laisha Ewing DO;  [facility];  Service: General    VAC DRESSING APPLICATION N/A 0/59/0711    Procedure: CHANGE DRESSING/VAC ABDOMEN;  Surgeon: Laisha Ewing DO;  [facility];  Service: General     Social History   Social History     Substance and Sexual Activity   Alcohol Use Not Currently     Social History     Substance and Sexual Activity   Drug Use No     Social History     Tobacco Use   Smoking Status Former Smoker    Packs/day: 1 00    Years: 15 00    Pack years: 15 00   Smokeless Tobacco Never Used   Tobacco Comment    1 5ppd x 23 years, quit 2014     Family History   Problem Relation Age of Onset    Lung cancer Father     Diabetes Maternal Aunt     Heart attack Mother     Clotting disorder Mother     Hypertension Mother     Heart failure Mother     Diabetes Mother     Atrial fibrillation Mother     Sleep apnea Mother     Obesity Mother     Asthma Sister     Stroke Neg Hx     Anuerysm Neg Hx     Arrhythmia Neg Hx     Hyperlipidemia Neg Hx         pt unsure    Fainting Neg Hx        Meds/Allergies     Medications Prior to Admission   Medication    acetaminophen (TYLENOL) 325 mg tablet    aluminum-magnesium hydroxide-simethicone (MYLANTA) 200-200-20 mg/5 mL suspension    ammonium lactate (LAC-HYDRIN) 12 % cream    budesonide-formoterol (SYMBICORT) 160-4 5 mcg/act inhaler    buPROPion (FORFIVO XL) 450 MG 24 hr tablet    Cholecalciferol (VITAMIN D) 50 MCG (2000 UT) tablet    cyanocobalamin (VITAMIN B-12) 1000 MCG tablet    dabigatran etexilate (PRADAXA) 150 mg capsu    dicyclomine (BENTYL) 20 mg tablet    digoxin (LANOXIN) 0 25 mg tablet    docusate sodium (COLACE) 100 mg capsule    famotidine (PEPCID) 20 mg tablet    gabapentin (NEURONTIN) 250 mg/5 mL solution    ipratropium-albuterol (DUO-NEB) 0 5-2 5 mg/3 mL nebulizer solution    levothyroxine 25 mcg tablet    loratadine (CLARITIN) 10 mg tablet    methocarbamol (ROBAXIN) 750 mg tablet    metoprolol tartrate (LOPRESSOR) 25 mg tablet    nystatin (MYCOSTATIN) cream    omeprazole (PriLOSEC) 20 mg delayed release capsule    polyethylene glycol (MIRALAX) 17 g packet    Potassium Chloride (KCL-20 PO)    QUEtiapine (SEROquel) 50 mg tablet    sodium chloride (OCEAN) 0 65 % nasal spray    sodium chloride 1 g tablet    spironolactone (ALDACTONE) 50 mg tablet    torsemide 40 MG TABS     Current Facility-Administered Medications   Medication Dose Route Frequency    acetaminophen (TYLENOL) tablet 975 mg  975 mg Oral Q8H [address]    aluminum-magnesium hydroxide-simethicone (MYLANTA) oral suspension 30 mL  30 mL Oral Q6H PRN    budesonide-formoterol (SYMBICORT) 160-4 5 mcg/act inhaler 2 puff  2 puff Inhalation BID    buPROPion (WELLBUTRIN XL) 24 hr tablet 450 mg  450 mg Oral Daily    dabigatran etexilate (PRADAXA) capsule 150 mg  150 mg Oral BID    dicyclomine (BENTYL) tablet 20 mg  20 mg Oral 4x Daily (AC & HS)    digoxin (LANOXIN) tablet 250 mcg  250 mcg Oral Daily    docusate sodium (COLACE) capsule 100 mg  100 mg Oral BID    famotidine (PEPCID) tablet 20 mg  20 mg Oral Daily    gabapentin (NEURONTIN) oral solution 300 mg  300 mg Oral TID    ipratropium (ATROVENT) 0 02 % inhalation solution 0 5 mg  0 5 mg Nebulization TID    levalbuterol (XOPENEX) inhalation solution 1 25 mg  1 25 mg Nebulization TID    levothyroxine tablet 25 mcg  25 mcg Oral Early Morning    methocarbamol (ROBAXIN) tablet 750 mg  750 mg Oral Q6H MARY    metoprolol tartrate (LOPRESSOR) tablet 25 mg  25 mg Oral Q8H    multi-electrolyte (PLASMALYTE-A/ISOLYTE-S PH 7 4) IV solution  75 mL/hr Intravenous Continuous    ondansetron (ZOFRAN) injection 4 mg  4 mg Intravenous Q4H PRN    oxyCODONE (ROXICODONE) immediate release tablet 10 mg  10 mg Oral Q4H PRN    oxyCODONE (ROXICODONE) IR tablet 5 mg  5 mg Oral Q4H PRN    pantoprazole (PROTONIX) injection 40 mg  40 mg Intravenous Q12H [facility]    polyethylene glycol (MIRALAX) packet 17 g  17 g Oral Daily    potassium chloride (K-DUR,KLOR-CON) CR tablet 20 mEq  20 mEq Oral Daily    potassium chloride 20 mEq IVPB (premix)  20 mEq Intravenous Once    QUEtiapine (SEROquel) tablet 50 mg  50 mg Oral HS    sodium chloride (OCEAN) 0 65 % nasal spray 1 spray  1 spray Each Nare Q1H PRN    sodium chloride tablet 2 g  2 g Oral TID       Allergies   Allergen Reactions    Penicillins Hives           Objective     Blood pressure (!) 117/33, pulse 77, temperature 97 7 °F (36 5 °C), resp  rate 18, height 5' 11" (1 803 m), SpO2 100 %        Intake/Output Summary (Last 24 hours) at 7/28/2022 1104  Last data filed at 7/28/2022 0745  Gross per 24 hour   Intake 1033 75 ml   Output 600 ml   Net 433 75 ml         PHYSICAL EXAM     General Appearance:   Alert, cooperative, no distress, appears stated age    HEENT:   Normocephalic, atraumatic, anicteric      Neck:  Supple, symmetrical, trachea midline, no adenopathy;    thyroid: no enlargement/tenderness/nodules; no carotid  bruit or JVD    Lungs:   Clear to auscultation bilaterally; no rales, rhonchi or wheezing; respirations unlabored    Heart[de-identified]   S1 and S2 normal; regular rate and rhythm; no murmur, rub, or gallop     Abdomen:   Soft, non-tender, non-distended; normal bowel sounds; no masses, no organomegaly    Genitalia:   Deferred    Rectal:   Deferred    Extremities:  No cyanosis, clubbing or edema    Pulses:  2+ and symmetric all extremities    Skin:  Skin color, texture, turgor normal, no rashes or lesions    Lymph nodes:  No palpable cervical, axillary or inguinal lymphadenopathy        Lab Results:   Admission on 07/26/2022   Component Date Value    Ventricular Rate 07/26/2022 65     Atrial Rate 07/26/2022 234     QRSD Interval 07/26/2022 76     QT Interval 07/26/2022 382     QTC Interval 07/26/2022 397     QRS Axis 07/26/2022 42     T Wave Axis 07/26/2022 266     WBC 07/26/2022 4 78     RBC 07/26/2022 2 85 (A)    Hemoglobin 07/26/2022 8 1 (A)    Hematocrit 07/26/2022 27 7 (A)    MCV 07/26/2022 97     MCH 07/26/2022 28 4     MCHC 07/26/2022 29 2 (A)    RDW 07/26/2022 17 5 (A)    MPV 07/26/2022 9 4     Platelets 69/95/6241 236     nRBC 07/26/2022 0     Neutrophils Relative 07/26/2022 54     Immat GRANS % 07/26/2022 0     Lymphocytes Relative 07/26/2022 35     Monocytes Relative 07/26/2022 10     Eosinophils Relative 07/26/2022 1     Basophils Relative 07/26/2022 0     Neutrophils Absolute 07/26/2022 2 55     Immature Grans Absolute 07/26/2022 0 01     Lymphocytes Absolute 07/26/2022 1 68     Monocytes Absolute 07/26/2022 0 49     Eosinophils Absolute 07/26/2022 0 04     Basophils Absolute 07/26/2022 0 01     Sodium 07/26/2022 135     Potassium 07/26/2022 4 0     Chloride 07/26/2022 82 (A)    CO2 07/26/2022 >45 (A)  ANION GAP 07/26/2022      BUN 07/26/2022 8     Creatinine 07/26/2022 0 68     Glucose 07/26/2022 96     Calcium 07/26/2022 7 8 (A)    Corrected Calcium 07/26/2022 9 0     AST 07/26/2022 49 (A)    ALT 07/26/2022 13     Alkaline Phosphatase 07/26/2022 107 (A)    Total Protein 07/26/2022 6 7     Albumin 07/26/2022 2 5 (A)    Total Bilirubin 07/26/2022 1 12 (A)    eGFR 07/26/2022 110     Lipase 07/26/2022 18     pH, Graeme 07/27/2022 7 378     pCO2, Graeme 07/27/2022 87 6 (A)    pO2, Graeme 07/27/2022 30 8 (A)    HCO3, Graeem 07/27/2022 50 4 (A)    Base Excess, Graeme 07/27/2022 21 9     O2 Content, Graeme 07/27/2022 7 2     O2 HGB, VENOUS 07/27/2022 55 0 (A)    Sodium 07/28/2022 134 (A)    Potassium 07/28/2022 3 1 (A)    Chloride 07/28/2022 83 (A)    CO2 07/28/2022 >45 (A)    ANION GAP 07/28/2022      BUN 07/28/2022 8     Creatinine 07/28/2022 0 59 (A)    Glucose 07/28/2022 93     Calcium 07/28/2022 8 3 (A)    eGFR 07/28/2022 117        Imaging Studies: I have personally reviewed pertinent reports  CT ABDOMEN AND PELVIS WITH IV CONTRAST     INDICATION:   Abdominal pain, acute, nonlocalized  Persistent nausea and vomiting, significant pain around the site of his enterocutaneous fistula  Arsalan Kay is a 47year old morbidly obese male presenting for evaluation of 5 days of decreased oral intake secondary to persistent nausea and vomiting, he is   also complaining of significant abdominal pain around the site of 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-bowel obstruction that occurred 2 months prior  The patient describes that he has bounced back and forth between the hospital   and his skilled nursing facility over the last month secondary to similar symptoms described above  The patient does endorse that he has continued to have bowel movements, he describes having watery diarrhea, he still passing gas  He denies any recent   fevers or chills    He does not have any chest pain at this time  He has had a recent increase in his oxygen requirement, he typically requires 2 L nasal cannula baseline, he was recently increased to 4 L  He denies any recent cough, congestion, upper   respiratory illness      COMPARISON:  CT 6/4/2022     TECHNIQUE:  CT examination of the abdomen and pelvis was performed  Axial, sagittal, and coronal 2D reformatted images were created from the source data and submitted for interpretation      Radiation dose length product (DLP) for this visit:  7204 mGy-cm   This examination, like all CT scans performed in the [facility], was performed utilizing techniques to minimize radiation dose exposure, including the use of iterative   reconstruction and automated exposure control      IV Contrast:  100 mL of iohexol (OMNIPAQUE) 50 mL of iohexol (OMNIPAQUE)  Enteric Contrast:  Enteric contrast was administered      FINDINGS:  Study is technically somewhat limited due to patient body habitus and associated artifact  Uppermost abdomen not scanned on this exam      ABDOMEN     LOWER CHEST:  Moderate cardiomegaly  Dependent bibasilar subsegmental atelectasis      LIVER/BILIARY TREE:  Diffuse hepatic steatosis without focal abnormality      GALLBLADDER:  Gallbladder is surgically absent      SPLEEN:  Triangular peripheral low-attenuation area in the upper pole the spleen suggestive of a focal splenic infarct      PANCREAS:  Unremarkable      ADRENAL GLANDS:  Partially imaged areas unremarkable      KIDNEYS/URETERS:  Unremarkable  No hydronephrosis      STOMACH AND BOWEL:  Limited evaluation due to artifact related to patient body habitus  Status post sleeve gastrectomy  Large ventral abdominal wall hernia containing loops of large and small bowel with some bowel loops approximating the skin surface   and dehiscent abdominal wound with some extraluminal contrast consistent with known enterocutaneous fistula  No definite intestinal obstruction    No bowel wall thickening      APPENDIX:  No findings to suggest appendicitis      ABDOMINOPELVIC CAVITY:  No ascites  No pneumoperitoneum  No lymphadenopathy      VESSELS:  Abdominal aorta normal in course and caliber  Infrarenal inferior vena caval filter noted      PELVIS     REPRODUCTIVE ORGANS:  Unremarkable for patient's age      URINARY BLADDER:  Unremarkable      ABDOMINAL WALL/INGUINAL REGIONS:  Postoperative change  Ventral abdominal wall hernia with recent postoperative change in the dehiscent ventral abdominal wound  Presumed Steward Monks in place      OSSEOUS STRUCTURES:  No acute fracture or destructive osseous lesion      IMPRESSION:        1  Technically limited study  2   Postoperative change as noted with large ventral abdominal wall hernia containing loops of large and small bowel without evidence for strangulation or obstruction  Ventral abdominal wall wound appears dehiscent with presumed Abthera in place and   findings consistent with known enterocutaneous fistula  3   Additional findings as noted        ASSESSMENT/PLAN:     1  Nausea and vomiting, patient felt to have likely gastroparesis based on findings from recent EGD; generally complex clinical picture and patient with severe morbid obesity and many medical comorbidities; nonetheless shows no signs of bowel obstruction or gastric outlet obstruction at this time, also has post cholecystectomy and shows no evidence of biliary obstruction either    - supportive care, antiemetics as needed, consider Phenergan or Reglan if suboptimal response to Zofran    - Protonix twice daily, can consider adding Carafate    - small frequent meals, gradual advancement of diet, in general would recommend low-fat and low residue modifiers when advanced to solid food    - given relatively recent endoscopic examination, patient already receiving empiric treatment for possible peptic ulcer disease/gastritis, and his operative risk factors, will hold off on endoscopic evaluation at this time    The patient was seen and examined by Dr Aquiles Mosley, all garza medical decisions were made with Dr Aquiles Mosley  Thank you for allowing us to participate in the care of this pleasant patient  We will follow up with you closely

## 2022-07-28 NOTE — CASE MANAGEMENT
Case Management Progress Note    Patient name Karan Hannah  Location W /W -72 MRN [id number]  : 1970 Date 2022       LOS (days): 0  Geometric Mean LOS (GMLOS) (days):   Days to GMLOS:        OBJECTIVE:        Current admission status: Observation  Preferred Pharmacy:   [address] [place] [id number]  Phone: [phone] Fax: [phone]    Primary Care Provider: Brit Jean MD    Primary Insurance: [id number]  Secondary Insurance:     PROGRESS NOTE:    CM was called by Aletha Davidson pt's [address] outside RN MUNOD [facility] P: [phone] called to assist with pt and return to facility if needed  MUNDO called Lyla CARABALLO for a return call

## 2022-07-28 NOTE — PROGRESS NOTES
Progress Note - general Surgery  Calista Mayers 47 y o  male MRN: [id number]  Unit/Bed#: W -01 Encounter: [id number]    Assessment:  47 y o  male with known EC fistula, who presented with mild abdominal pain and recurrent episodes of emesis  Upon reviewing CT scan yesterday, no pathologies were noted that would support a surgical cause for his persistent emesis  Patient is stable this morning with his nausea controlled  Left thigh skin graft donor site as well as abdominal skin graft recipient site have slight presence purulence without any signs of surrounding erythema to suggest spreading or deep infection  Plan:  - Diet Clear Liquid  - Pain and Nausea control PRN  - Incentive spirometry  - OOB, ambulate  - continue local wound care with daily soap and water cleansing of the wound and application of dry dressing  - continue to monitor fistula output with wound manager   - wound care consult to follow  - local wound care to left thigh and abdomen  - GI for workup of persistent emesis    Subjective/Objective     Subjective:  Patient states he has some abdominal discomfort this morning  He has tenderness at site of the [facility] fistula as well as the left thigh  Objective:   Vitals: Blood pressure (!) 117/33, pulse 77, temperature 97 7 °F (36 5 °C), resp  rate 18, height 5' 11" (1 803 m), SpO2 96 %  ,Body mass index is 71 13 kg/m²  I/O       07/26 0701 07/27 0700 07/27 0701 07/28 0700 07/28 0701 07/29 0700    I V   973 8     Total Intake  973 8     Urine  600     Total Output  600     Net  +373 8                  Physical Exam:  Gen: NAD, Aox3, Comfortable in bed  Chest: Normal work of breathing, no respiratory distress  Abd: Soft, ND, tenderness at site of EC fistula  EC fistula with approximately 100 cc output in the bag  Fistula orifice approximately 2 cm with greenish brown liquid output  Abdominal skin graft site is pink and well perfused with clean edges  No surrounding erythema    Slight presence of purulence and fibrinous tissue atop of the graft site  Ext: No Edema  Left thigh skin donor site with mild area of purulence and for venous tissue  Skin edges with no erythema  Area is mildly tender  Skin: Warm, Dry, Intact      Lab, Imaging and other studies:   I have personally reviewed pertinent reports    , CBC with diff: No results found for: WBC, HGB, HCT, MCV, PLT, ADJUSTEDWBC, MCH, MCHC, RDW, MPV, NRBC, BMP/CMP:   Lab Results   Component Value Date    SODIUM 134 (L) 07/28/2022    K 3 1 (L) 07/28/2022    CL 83 (L) 07/28/2022    CO2 >45 (HH) 07/28/2022    BUN 8 07/28/2022    CREATININE 0 59 (L) 07/28/2022    CALCIUM 8 3 (L) 07/28/2022    EGFR 117 07/28/2022   , Blood Culture: No results found for: BLOODCX  VTE Pharmacologic Prophylaxis: Sequential compression device (Venodyne)   VTE Mechanical Prophylaxis: sequential compression device        Duarte Lees MD  7/28/2022  7:59 AM

## 2022-07-28 NOTE — PROGRESS NOTES
Progress Note - General Surgery   Avinash Dietrich 47 y o  male MRN: [id number]  Unit/Bed#: W -05 Encounter: [id number]    Assessment:  Patient is a 47 y o  male w/ BMI of 70 PMH of SBO and large ventral s/p Ex lap, repair of serosal tears, bridging mesh, STSG (June-July 2022)  w/  Known EC fistula p/w decreased PO intake  No evidence of obstruction  Fistula- minimal formed stool  Plan:   Clear liquids, ensures  o Monitor PO intake, nutritional status  I/Os- Please record fistula output   Appreciate GI input  o Dysmotility vs bezoar  o Phenergan, Zofran   Local Wound Care to STSG site and abdominal wound   PT/OT   Please [employer] on call [facility] Surgery Role with any questions     Subjective/Objective     Subjective:   No acute events overnight  Nausea improved slightly with GI recommendations  No vomiting  Tolerating very little PO  Passing Flatus  Having BMs  Pertinent review of systems as above  All other review of systems negative  Objective:    Blood pressure 108/53, pulse 67, temperature 97 9 °F (36 6 °C), resp  rate 18, height 5' 11" (1 803 m), SpO2 99 %  ,Body mass index is 71 13 kg/m²  Intake/Output Summary (Last 24 hours) at 7/29/2022 0603  Last data filed at 7/28/2022 2201  Gross per 24 hour   Intake 1265 ml   Output 300 ml   Net 965 ml       Invasive Devices  Report    Peripheral Intravenous Line  Duration           Peripheral IV 07/27/22 Left Antecubital 2 days          Drain  Duration           Open Drain Medial RUQ 14 days                Physical Exam:   Gen:  NAD  HEENT: NCAT  MMM  CV: well perfused  Lungs: Normal respiratory effort  Abd: soft, nt/nd, fistula with some formed stool  Skin: warm/ dry  Extremities: no peripheral edema, no clubbing or cyanosis, L thigh dressing changed  Neuro:  AxO x3      Results from last 7 days   Lab Units 07/26/22  2335 07/23/22  2053   WBC Thousand/uL 4 78 5 67   HEMOGLOBIN g/dL 8 1* 8 5*   HEMATOCRIT % 27 7* 29 0*   PLATELETS Thousands/uL 236 233     Results from last 7 days   Lab Units 07/28/22  0457 07/26/22  2335 07/23/22  2053   POTASSIUM mmol/L 3 1* 4 0 3 7   CHLORIDE mmol/L 83* 82* 91*   CO2 mmol/L >45* >45* 40*   BUN mg/dL 8 8 8   CREATININE mg/dL 0 59* 0 68 0 65   CALCIUM mg/dL 8 3* 7 8* 8 5            I have personally reviewed pertinent films in PACS      Medications:   Scheduled Meds:  Current Facility-Administered Medications   Medication Dose Route Frequency Provider Last Rate    acetaminophen  975 mg Oral [facility] Phylicia Browning PA-C      aluminum-magnesium hydroxide-simethicone  30 mL Oral Q6H PRN Phylicia Browning PA-C      budesonide-formoterol  2 puff Inhalation BID Phylicia Browning, PA-C      buPROPion  150 mg Oral Daily Phylicia Nallely, PA-C      dabigatran etexilate  150 mg Oral BID Phylicia Browning, PA-C      dicyclomine  20 mg Oral 4x Daily (AC & HS) Phylicia Browning PA-C      digoxin  250 mcg Oral Daily Phylicia Nallely, PA-C      docusate sodium  100 mg Oral BID Phylicia Browning, PA-C      famotidine  20 mg Oral Daily Phylicia Browning, PA-C      gabapentin  300 mg Oral TID ALESSANDRO Medrano-JAMES      HYDROmorphone  1 mg Intravenous Q4H PRN Guillermina Petty MD      ipratropium  0 5 mg Nebulization TID Madina Dsouza MD      levalbuterol  1 25 mg Nebulization TID Madina Dsouza MD      levothyroxine  25 mcg Oral [facility] Phylicia Browning, PA-C      methocarbamol  750 mg Oral Q6H [address] Phyliciachelsi Browning, PA-C      metoprolol tartrate  25 mg Oral Q8H Phylicia Browning PA-C      multi-electrolyte  75 mL/hr Intravenous Continuous Madina Dsouza MD 75 mL/hr (07/28/22 2200)    ondansetron  4 mg Intravenous Q4H PRN Viki Esparza MD      oxyCODONE  10 mg Oral Q4H PRN Phylicia Browning PA-C      oxyCODONE  5 mg Oral Q4H PRN Phylicia Browning PA-C      pantoprazole  40 mg Intravenous Q12H Austen Araya MD      polyethylene glycol  17 g Oral Daily Phylicia Browning PA-C      potassium chloride  20 mEq Oral Daily Avani Ahn PA-C      promethazine  12 5 mg Intravenous Q8H Remy Parada PA-C      QUEtiapine  50 mg Oral HS Avani Ahn PA-C      sodium chloride  1 spray Each Nare Q1H PRN Avani Ahn PA-C      sodium chloride  2 g Oral TID Avani Ahn PA-C       Continuous Infusions:multi-electrolyte, 75 mL/hr, Last Rate: 75 mL/hr (07/28/22 2200)      PRN Meds:  aluminum-magnesium hydroxide-simethicone, 30 mL, Q6H PRN  HYDROmorphone, 1 mg, Q4H PRN  ondansetron, 4 mg, Q4H PRN  oxyCODONE, 10 mg, Q4H PRN  oxyCODONE, 5 mg, Q4H PRN  sodium chloride, 1 spray, Q1H PRN

## 2022-07-28 NOTE — PROGRESS NOTES
[facility]  Progress Note - Madan Philippe 1970, 47 y o  male MRN: [id number]  Unit/Bed#: W -36 Encounter: [id number]  Primary Care Provider: Yuri Soto MD   Date and time admitted to hospital: 7/26/2022 10:55 PM    * Intractable abdominal pain  Assessment & Plan  · Presents today with intractable abdominal pain and nausea with vomiting  Refused to eat in the ER  · No discrete evidence of acute abdominal pathology on CT scan, would continue with conservative management  · General surgery evaluated, recommending a GI consult  · Bentyl ordered  Clear liquid diet for now  Antiemetics ordered  · ? EGD    Atrial fibrillation ([email or web address] )  Assessment & Plan  · Rate controlled with Lopressor  · Continue digoxin and Pradaxa  · Monitor heart rate  Chronic diastolic heart failure [facility])  Assessment & Plan  Wt Readings from Last 3 Encounters:   07/23/22 (!) 231 kg (510 lb)   07/19/22 (!) 228 kg (503 lb)   07/19/22 (!) 229 kg (503 lb 15 5 oz)     · Will hold diuretics for now given poor oral intake  · Resume when able  Chronic respiratory failure with hypoxia (HCC)  Assessment & Plan  · Chronically on 2-4 L secondary to CHF, morbid obesity and hypoventilation syndrome  · Monitor O2 status  No shortness of breath reported presently  Appearing comfortable with no respiratory distress  History of DVT (deep vein thrombosis)  Assessment & Plan  · Continue Pradaxa    S/p small bowel obstruction  Assessment & Plan  · Had lengthy hospitalization in June of 2022 for SBO  · No evidence of SBO on CT scan  · Surgery evaluated, no inpatient surgical recommendations besides medical management  Morbid obesity ([facility] [address] )  Assessment & Plan  · Body mass index is 71 13 kg/m²     · Will need some dietary modifications as exercise will be difficult for this patient        VTE Pharmacologic Prophylaxis: VTE Score: 4 Moderate Risk (Score 3-4) - Pharmacological DVT Prophylaxis Ordered: dabigatran (Pradaxa)  Patient Centered Rounds: I performed bedside rounds with nursing staff today  Discussions with Specialists or Other Care Team Provider: cm, nursing    Education and Discussions with Family / Patient: Patient declined call to   Time Spent for Care: 30 minutes  More than 50% of total time spent on counseling and coordination of care as described above  Current Length of Stay: 0 day(s)  Current Patient Status: Observation   Certification Statement: The patient, admitted on an observation basis, will now require > 2 midnight hospital stay due to Intractable abdominal pain with nausea  Discharge Plan: Anticipate discharge in 24-48 hrs to prior assisted or independent living facility  Code Status: Level 1 - Full Code    Subjective:   Patient still complaining of intractable nausea  Only eat a few bites of a tight ice yesterday  Still dry heaving this morning  Complaining of generalized abdominal pain  Objective:     Vitals:   Temp (24hrs), Av 6 °F (36 4 °C), Min:97 3 °F (36 3 °C), Max:97 7 °F (36 5 °C)    Temp:  [97 3 °F (36 3 °C)-97 7 °F (36 5 °C)] 97 7 °F (36 5 °C)  HR:  [44-86] 77  Resp:  [16-25] 18  BP: ()/(33-67) 117/33  SpO2:  [94 %-100 %] 100 %  Body mass index is 71 13 kg/m²  Input and Output Summary (last 24 hours): Intake/Output Summary (Last 24 hours) at 2022 1046  Last data filed at 2022 0745  Gross per 24 hour   Intake 1033 75 ml   Output 600 ml   Net 433 75 ml       Physical Exam:   Physical Exam  Vitals and nursing note reviewed  Constitutional:       General: He is not in acute distress  Appearance: Normal appearance  He is obese  Interventions: Nasal cannula in place  Comments: + dry heaving   HENT:      Head: Normocephalic  Mouth/Throat:      Mouth: Mucous membranes are moist    Eyes:      Pupils: Pupils are equal, round, and reactive to light     Cardiovascular:      Rate and Rhythm: Normal rate and regular rhythm  Heart sounds: No murmur heard  Pulmonary:      Effort: Pulmonary effort is normal  No respiratory distress  Breath sounds: Normal breath sounds  No wheezing, rhonchi or rales  Abdominal:      General: Bowel sounds are normal  There is no distension  Palpations: Abdomen is soft  Tenderness: There is abdominal tenderness (diffuse)  There is no guarding  Hernia: No hernia is present  Comments: + EC fistula, greenish yellow drainage  No bleeding   Musculoskeletal:         General: No deformity  Cervical back: Normal range of motion  Right lower leg: Edema present  Left lower leg: Edema present  Skin:     Capillary Refill: Capillary refill takes less than 2 seconds  Neurological:      General: No focal deficit present  Mental Status: He is alert and oriented to person, place, and time  Mental status is at baseline           Additional Data:     Labs:  Results from last 7 days   Lab Units 07/26/22  2335   WBC Thousand/uL 4 78   HEMOGLOBIN g/dL 8 1*   HEMATOCRIT % 27 7*   PLATELETS Thousands/uL 236   NEUTROS PCT % 54   LYMPHS PCT % 35   MONOS PCT % 10   EOS PCT % 1     Results from last 7 days   Lab Units 07/28/22  0457 07/26/22  2335 07/23/22  2053   SODIUM mmol/L 134* 135 135   POTASSIUM mmol/L 3 1* 4 0 3 7   CHLORIDE mmol/L 83* 82* 91*   CO2 mmol/L >45* >45* 40*   BUN mg/dL 8 8 8   CREATININE mg/dL 0 59* 0 68 0 65   ANION GAP mmol/L  --   --  4   CALCIUM mg/dL 8 3* 7 8* 8 5   ALBUMIN g/dL  --  2 5* 2 5*   TOTAL BILIRUBIN mg/dL  --  1 12* 0 71   ALK PHOS U/L  --  107* 124*   ALT U/L  --  13 11   AST U/L  --  49* 15   GLUCOSE RANDOM mg/dL 93 96 113             Results from last 7 days   Lab Units 07/25/22  0705   HEMOGLOBIN A1C % 5 2           Lines/Drains:  Invasive Devices  Report    Peripheral Intravenous Line  Duration           Peripheral IV 07/27/22 Left Antecubital 1 day          Drain  Duration           Open Drain Medial RUQ 13 days Imaging: No pertinent imaging reviewed      Recent Cultures (last 7 days):         Last 24 Hours Medication List:   Current Facility-Administered Medications   Medication Dose Route Frequency Provider Last Rate    acetaminophen  975 mg Oral [facility] Renato Ness PA-C      aluminum-magnesium hydroxide-simethicone  30 mL Oral Q6H PRN Renato Ness PA-C      budesonide-formoterol  2 puff Inhalation BID Renato Ness PA-C      buPROPion  450 mg Oral Daily Renato Ness PA-C      dabigatran etexilate  150 mg Oral BID Renato Ness PA-C      dicyclomine  20 mg Oral 4x Daily (AC & HS) Renato Ness PA-C      digoxin  250 mcg Oral Daily Renato Ness PA-C      docusate sodium  100 mg Oral BID Renato Ness PA-C      famotidine  20 mg Oral Daily Renato Ness PA-C      gabapentin  300 mg Oral TID Renato Ness PA-C      ipratropium  0 5 mg Nebulization TID Byron Delgado MD      levalbuterol  1 25 mg Nebulization TID Byron Delgado MD      levothyroxine  25 mcg Oral Early Morning Renato Ness PA-C      methocarbamol  750 mg Oral Q6H [address] Renato Ness PA-C      metoprolol tartrate  25 mg Oral Q8H Renato Ness PA-C      multi-electrolyte  75 mL/hr Intravenous Continuous Byron Delgado MD 75 mL/hr (07/28/22 0556)    ondansetron  4 mg Intravenous Q4H PRN Luz Brar MD      oxyCODONE  10 mg Oral Q4H PRN Renato Ness PA-C      oxyCODONE  5 mg Oral Q4H PRN Renato Ness PA-C      pantoprazole  40 mg Intravenous Q12H [address] Byron Delgado MD      polyethylene glycol  17 g Oral Daily Renato Ness PA-C      potassium chloride  20 mEq Oral Daily Renato Ness PA-C      potassium chloride  20 mEq Intravenous Once Renato Ness PA-C      QUEtiapine  50 mg Oral HS Renato Ness PA-C      sodium chloride  1 spray Each Nare Q1H PRN Renato Ness PA-C      sodium chloride  2 g Oral TID Renato Ness PA-C          Today, Patient Was Seen By: En Toro PA-C    **Please Note: This note may have been constructed using a voice recognition system  **

## 2022-07-29 LAB
BUN SERPL-MCNC: 6 MG/DL (ref 5–25)
CALCIUM SERPL-MCNC: 8.1 MG/DL (ref 8.4–10.2)
CHLORIDE SERPL-SCNC: 84 MMOL/L (ref 96–108)
CO2 SERPL-SCNC: >45 MMOL/L (ref 21–32)
CREAT SERPL-MCNC: 0.56 MG/DL (ref 0.6–1.3)
GFR SERPL CREATININE-BSD FRML MDRD: 119 ML/MIN/1.73SQ M
GLUCOSE P FAST SERPL-MCNC: 100 MG/DL (ref 65–99)
GLUCOSE SERPL-MCNC: 100 MG/DL (ref 65–140)
POTASSIUM SERPL-SCNC: 3.1 MMOL/L (ref 3.5–5.3)
SODIUM SERPL-SCNC: 135 MMOL/L (ref 135–147)

## 2022-07-29 PROCEDURE — 94640 AIRWAY INHALATION TREATMENT: CPT

## 2022-07-29 PROCEDURE — 94003 VENT MGMT INPAT SUBQ DAY: CPT

## 2022-07-29 PROCEDURE — 94660 CPAP INITIATION&MGMT: CPT

## 2022-07-29 PROCEDURE — C9113 INJ PANTOPRAZOLE SODIUM, VIA: HCPCS | Performed by: INTERNAL MEDICINE

## 2022-07-29 PROCEDURE — 94760 N-INVAS EAR/PLS OXIMETRY 1: CPT

## 2022-07-29 PROCEDURE — 99232 SBSQ HOSP IP/OBS MODERATE 35: CPT | Performed by: PHYSICIAN ASSISTANT

## 2022-07-29 PROCEDURE — 80048 BASIC METABOLIC PNL TOTAL CA: CPT | Performed by: PHYSICIAN ASSISTANT

## 2022-07-29 PROCEDURE — 99232 SBSQ HOSP IP/OBS MODERATE 35: CPT | Performed by: INTERNAL MEDICINE

## 2022-07-29 PROCEDURE — 99232 SBSQ HOSP IP/OBS MODERATE 35: CPT | Performed by: SURGERY

## 2022-07-29 RX ORDER — PROMETHAZINE HYDROCHLORIDE 25 MG/ML
25 INJECTION, SOLUTION INTRAMUSCULAR; INTRAVENOUS EVERY 8 HOURS
Status: DISCONTINUED | OUTPATIENT
Start: 2022-07-29 | End: 2022-07-30

## 2022-07-29 RX ORDER — FAMOTIDINE 10 MG/ML
20 INJECTION, SOLUTION INTRAVENOUS EVERY 12 HOURS SCHEDULED
Status: DISCONTINUED | OUTPATIENT
Start: 2022-07-29 | End: 2022-07-31

## 2022-07-29 RX ORDER — MAGNESIUM SULFATE HEPTAHYDRATE 40 MG/ML
2 INJECTION, SOLUTION INTRAVENOUS ONCE
Status: COMPLETED | OUTPATIENT
Start: 2022-07-29 | End: 2022-07-30

## 2022-07-29 RX ORDER — METOPROLOL TARTRATE 5 MG/5ML
5 INJECTION INTRAVENOUS EVERY 8 HOURS
Status: DISCONTINUED | OUTPATIENT
Start: 2022-07-29 | End: 2022-08-01

## 2022-07-29 RX ORDER — DIGOXIN 0.25 MG/ML
250 INJECTION INTRAMUSCULAR; INTRAVENOUS DAILY
Status: DISCONTINUED | OUTPATIENT
Start: 2022-07-29 | End: 2022-08-03 | Stop reason: HOSPADM

## 2022-07-29 RX ORDER — FONDAPARINUX SODIUM 10 MG/.8ML
10 INJECTION SUBCUTANEOUS EVERY 24 HOURS
Status: DISCONTINUED | OUTPATIENT
Start: 2022-07-29 | End: 2022-08-01

## 2022-07-29 RX ORDER — DEXTROSE, SODIUM CHLORIDE, AND POTASSIUM CHLORIDE 5; .9; .15 G/100ML; G/100ML; G/100ML
75 INJECTION INTRAVENOUS CONTINUOUS
Status: DISCONTINUED | OUTPATIENT
Start: 2022-07-29 | End: 2022-08-02

## 2022-07-29 RX ADMIN — DEXTROSE, SODIUM CHLORIDE, AND POTASSIUM CHLORIDE 75 ML/HR: 5; .9; .15 INJECTION INTRAVENOUS at 14:15

## 2022-07-29 RX ADMIN — SODIUM CHLORIDE, SODIUM GLUCONATE, SODIUM ACETATE, POTASSIUM CHLORIDE, MAGNESIUM CHLORIDE, SODIUM PHOSPHATE, DIBASIC, AND POTASSIUM PHOSPHATE 75 ML/HR: .53; .5; .37; .037; .03; .012; .00082 INJECTION, SOLUTION INTRAVENOUS at 07:28

## 2022-07-29 RX ADMIN — FAMOTIDINE 20 MG: 10 INJECTION, SOLUTION INTRAVENOUS at 21:38

## 2022-07-29 RX ADMIN — LEVALBUTEROL HYDROCHLORIDE 1.25 MG: 1.25 SOLUTION, CONCENTRATE RESPIRATORY (INHALATION) at 19:55

## 2022-07-29 RX ADMIN — HYDROMORPHONE HYDROCHLORIDE 1 MG: 1 INJECTION, SOLUTION INTRAMUSCULAR; INTRAVENOUS; SUBCUTANEOUS at 07:25

## 2022-07-29 RX ADMIN — IPRATROPIUM BROMIDE 0.5 MG: 0.5 SOLUTION RESPIRATORY (INHALATION) at 07:20

## 2022-07-29 RX ADMIN — METOROPROLOL TARTRATE 5 MG: 5 INJECTION, SOLUTION INTRAVENOUS at 18:45

## 2022-07-29 RX ADMIN — PANTOPRAZOLE SODIUM 40 MG: 40 INJECTION, POWDER, FOR SOLUTION INTRAVENOUS at 21:38

## 2022-07-29 RX ADMIN — HYDROMORPHONE HYDROCHLORIDE 1 MG: 1 INJECTION, SOLUTION INTRAMUSCULAR; INTRAVENOUS; SUBCUTANEOUS at 17:07

## 2022-07-29 RX ADMIN — LEVALBUTEROL HYDROCHLORIDE 1.25 MG: 1.25 SOLUTION, CONCENTRATE RESPIRATORY (INHALATION) at 07:20

## 2022-07-29 RX ADMIN — PROMETHAZINE HYDROCHLORIDE 25 MG: 25 INJECTION, SOLUTION INTRAMUSCULAR; INTRAVENOUS at 14:40

## 2022-07-29 RX ADMIN — METOROPROLOL TARTRATE 5 MG: 5 INJECTION, SOLUTION INTRAVENOUS at 11:23

## 2022-07-29 RX ADMIN — PROMETHAZINE HYDROCHLORIDE 12.5 MG: 25 INJECTION INTRAMUSCULAR; INTRAVENOUS at 06:25

## 2022-07-29 RX ADMIN — PROMETHAZINE HYDROCHLORIDE 25 MG: 25 INJECTION, SOLUTION INTRAMUSCULAR; INTRAVENOUS at 21:38

## 2022-07-29 RX ADMIN — IPRATROPIUM BROMIDE 0.5 MG: 0.5 SOLUTION RESPIRATORY (INHALATION) at 13:38

## 2022-07-29 RX ADMIN — HYDROMORPHONE HYDROCHLORIDE 1 MG: 1 INJECTION, SOLUTION INTRAMUSCULAR; INTRAVENOUS; SUBCUTANEOUS at 21:38

## 2022-07-29 RX ADMIN — PANTOPRAZOLE SODIUM 40 MG: 40 INJECTION, POWDER, FOR SOLUTION INTRAVENOUS at 08:06

## 2022-07-29 RX ADMIN — FONDAPARINUX SODIUM 10 MG: 10 INJECTION, SOLUTION SUBCUTANEOUS at 14:12

## 2022-07-29 RX ADMIN — LEVALBUTEROL HYDROCHLORIDE 1.25 MG: 1.25 SOLUTION, CONCENTRATE RESPIRATORY (INHALATION) at 13:38

## 2022-07-29 RX ADMIN — FAMOTIDINE 20 MG: 10 INJECTION, SOLUTION INTRAVENOUS at 11:23

## 2022-07-29 RX ADMIN — HYDROMORPHONE HYDROCHLORIDE 1 MG: 1 INJECTION, SOLUTION INTRAMUSCULAR; INTRAVENOUS; SUBCUTANEOUS at 11:28

## 2022-07-29 RX ADMIN — MAGNESIUM SULFATE HEPTAHYDRATE 2 G: 40 INJECTION, SOLUTION INTRAVENOUS at 21:39

## 2022-07-29 RX ADMIN — IPRATROPIUM BROMIDE 0.5 MG: 0.5 SOLUTION RESPIRATORY (INHALATION) at 19:55

## 2022-07-29 RX ADMIN — DIGOXIN 250 MCG: 0.25 INJECTION INTRAMUSCULAR; INTRAVENOUS at 11:23

## 2022-07-29 NOTE — ASSESSMENT & PLAN NOTE
· 3 1 today, secondary to poor oral intake  · Potassium containing fluids ordered  · Repeat CMP tomorrow  · Check magnesium tomorrow

## 2022-07-29 NOTE — PLAN OF CARE
Problem: GASTROINTESTINAL - ADULT  Goal: Minimal or absence of nausea and/or vomiting  Description: INTERVENTIONS:  - Administer IV fluids if ordered to ensure adequate hydration  - Maintain NPO status until nausea and vomiting are resolved  - Nasogastric tube if ordered  - Administer ordered antiemetic medications as needed  - Provide nonpharmacologic comfort measures as appropriate  - Advance diet as tolerated, if ordered  - Consider nutrition services referral to assist patient with adequate nutrition and appropriate food choices  Outcome: Progressing  Goal: Maintains or returns to baseline bowel function  Description: INTERVENTIONS:  - Assess bowel function  - Encourage oral fluids to ensure adequate hydration  - Administer IV fluids if ordered to ensure adequate hydration  - Administer ordered medications as needed  - Encourage mobilization and activity  - Consider nutritional services referral to assist patient with adequate nutrition and appropriate food choices  Outcome: Progressing  Goal: Maintains adequate nutritional intake  Description: INTERVENTIONS:  - Monitor percentage of each meal consumed  - Identify factors contributing to decreased intake, treat as appropriate  - Assist with meals as needed  - Monitor I&O, weight, and lab values if indicated  - Obtain nutrition services referral as needed  Outcome: Progressing  Goal: Establish and maintain optimal ostomy function  Description: INTERVENTIONS:  - Assess bowel function  - Encourage oral fluids to ensure adequate hydration  - Administer IV fluids if ordered to ensure adequate hydration   - Administer ordered medications as needed  - Encourage mobilization and activity  - Nutrition services referral to assist patient with appropriate food choices  - Assess stoma site  - Consider wound care consult   Outcome: Progressing  Goal: Oral mucous membranes remain intact  Description: INTERVENTIONS  - Assess oral mucosa and hygiene practices  - Implement preventative oral hygiene regimen  - Implement oral medicated treatments as ordered  - Initiate Nutrition services referral as needed  Outcome: Progressing     Problem: SKIN/TISSUE INTEGRITY - ADULT  Goal: Skin Integrity remains intact(Skin Breakdown Prevention)  Description: Assess:  -Perform Arnoldo assessment every   -Clean and moisturize skin every   -Inspect skin when repositioning, toileting, and assisting with ADLS  -Assess under medical devices such as  every   -Assess extremities for adequate circulation and sensation     Bed Management:  -Have minimal linens on bed & keep smooth, unwrinkled  -Change linens as needed when moist or perspiring  -Avoid sitting or lying in one position for more than  hours while in bed  -Keep HOB at degrees     Toileting:  -Offer bedside commode  -Assess for incontinence every   -Use incontinent care products after each incontinent episode such as     Activity:  -Mobilize patient  times a day  -Encourage activity and walks on unit  -Encourage or provide ROM exercises   -Turn and reposition patient every  Hours  -Use appropriate equipment to lift or move patient in bed  -Instruct/ Assist with weight shifting every  when out of bed in chair  -Consider limitation of chair time  hour intervals    Skin Care:  -Avoid use of baby powder, tape, friction and shearing, hot water or constrictive clothing  -Relieve pressure over bony prominences using   -Do not massage red bony areas    Next Steps:  -Teach patient strategies to minimize risks such as    -Consider consults to  interdisciplinary teams such a  Outcome: Progressing  Goal: Incision(s), wounds(s) or drain site(s) healing without S/S of infection  Description: INTERVENTIONS  - Assess and document dressing, incision, wound bed, drain sites and surrounding tissue  - Provide patient and family education  - Perform skin care/dressing changes every  Outcome: Progressing  Goal: Pressure injury heals and does not worsen  Description: Interventions:  - Implement low air loss mattress or specialty surface (Criteria met)  - Apply silicone foam dressing  - Instruct/assist with weight shifting every  minutes when in chair   - Limit chair time to  hour intervals  - Use special pressure reducing interventions such as  when in chair   - Apply fecal or urinary incontinence containment device   - Perform passive or active ROM every   - Turn and reposition patient & offload bony prominences every  hours   - Utilize friction reducing device or surface for transfers   - Consider consults to  interdisciplinary teams such as   - Use incontinent care products after each incontinent episode such   - Consider nutrition services referral as needed  Outcome: Progressing     Problem: MUSCULOSKELETAL - ADULT  Goal: Maintain or return mobility to safest level of function  Description: INTERVENTIONS:  - Assess patient's ability to carry out ADLs; assess patient's baseline for ADL function and identify physical deficits which impact ability to perform ADLs (bathing, care of mouth/teeth, toileting, grooming, dressing, etc )  - Assess/evaluate cause of self-care deficits   - Assess range of motion  - Assess patient's mobility  - Assess patient's need for assistive devices and provide as appropriate  - Encourage maximum independence but intervene and supervise when necessary  - Involve family in performance of ADLs  - Assess for home care needs following discharge   - Consider OT consult to assist with ADL evaluation and planning for discharge  - Provide patient education as appropriate  Outcome: Progressing  Goal: Maintain proper alignment of affected body part  Description: INTERVENTIONS:  - Support, maintain and protect limb and body alignment  - Provide patient/ family with appropriate education  Outcome: Progressing     Problem: MOBILITY - ADULT  Goal: Maintain or return to baseline ADL function  Description: INTERVENTIONS:  -  Assess patient's ability to carry out ADLs; assess patient's baseline for ADL function and identify physical deficits which impact ability to perform ADLs (bathing, care of mouth/teeth, toileting, grooming, dressing, etc )  - Assess/evaluate cause of self-care deficits   - Assess range of motion  - Assess patient's mobility; develop plan if impaired  - Assess patient's need for assistive devices and provide as appropriate  - Encourage maximum independence but intervene and supervise when necessary  - Involve family in performance of ADLs  - Assess for home care needs following discharge   - Consider OT consult to assist with ADL evaluation and planning for discharge  - Provide patient education as appropriate  Outcome: Progressing  Goal: Maintains/Returns to pre admission functional level  Description: INTERVENTIONS:  - Perform BMAT or MOVE assessment daily    - Set and communicate daily mobility goal to care team and patient/family/caregiver  - Collaborate with rehabilitation services on mobility goals if consulted  - Perform Range of Motion  times a day  - Reposition patient every  hours  - Dangle patient  times a day  - Stand patient  times a day  - Ambulate patient  times a day  - Out of bed to chair  times a day   - Out of bed for meal times a day  - Out of bed for toileting  - Record patient progress and toleration of activity level   Outcome: Progressing     Problem: Nutrition/Hydration-ADULT  Goal: Nutrient/Hydration intake appropriate for improving, restoring or maintaining nutritional needs  Description: Monitor and assess patient's nutrition/hydration status for malnutrition  Collaborate with interdisciplinary team and initiate plan and interventions as ordered  Monitor patient's weight and dietary intake as ordered or per policy  Utilize nutrition screening tool and intervene as necessary  Determine patient's food preferences and provide high-protein, high-caloric foods as appropriate       INTERVENTIONS:  - Monitor oral intake, urinary output, labs, and treatment plans  - Assess nutrition and hydration status and recommend course of action  - Evaluate amount of meals eaten  - Assist patient with eating if necessary   - Allow adequate time for meals  - Recommend/ encourage appropriate diets, oral nutritional supplements, and vitamin/mineral supplements  - Order, calculate, and assess calorie counts as needed  - Recommend, monitor, and adjust tube feedings and TPN based on assessed needs  - Assess need for intravenous fluids  - Provide nutrition/hydration education as appropriate  - Include patient/family/caregiver in decisions related to nutrition  Outcome: Progressing     Problem: Potential for Falls  Goal: Patient will remain free of falls  Description: INTERVENTIONS:  - Educate patient/family on patient safety including physical limitations  - Instruct patient to call for assistance with activity   - Consult OT/PT to assist with strengthening/mobility   - Keep Call bell within reach  - Keep bed low and locked with side rails adjusted as appropriate  - Keep care items and personal belongings within reach  - Initiate and maintain comfort rounds  - Make Fall Risk Sign visible to staff  - Offer Toileting every  Hours, in advance of need  - Initiate/Maintain alarm  - Obtain necessary fall risk management equipment  - Apply yellow socks and bracelet for high fall risk patients  - Consider moving patient to room near nurses station  Outcome: Progressing     Problem: Prexisting or High Potential for Compromised Skin Integrity  Goal: Skin integrity is maintained or improved  Description: INTERVENTIONS:  - Identify patients at risk for skin breakdown  - Assess and monitor skin integrity  - Assess and monitor nutrition and hydration status  - Monitor labs   - Assess for incontinence   - Turn and reposition patient  - Assist with mobility/ambulation  - Relieve pressure over bony prominences  - Avoid friction and shearing  - Provide appropriate hygiene as needed including keeping skin clean and dry  - Evaluate need for skin moisturizer/barrier cream  - Collaborate with interdisciplinary team   - Patient/family teaching  - Consider wound care consult   Outcome: Progressing

## 2022-07-29 NOTE — PROGRESS NOTES
Progress Note - Marialuisa Bobo 47 y o  male MRN: [id number]    Unit/Bed#: W -01 Encounter: [id number]        Subjective:   Patient tells me he has not had any vomiting this morning but generally will vomit or feel like vomiting with all oral intake  Has been reported with minimal food intake today  Patient reports passing flatus and having bowel movements  Objective:     Vitals: Blood pressure 113/59, pulse 82, temperature 98 2 °F (36 8 °C), resp  rate 18, height 5' 11" (1 803 m), SpO2 100 %  ,Body mass index is 71 13 kg/m²  Intake/Output Summary (Last 24 hours) at 7/29/2022 1327  Last data filed at 7/29/2022 1220  Gross per 24 hour   Intake 1305 ml   Output 320 ml   Net 985 ml       Physical Exam:   General appearance: alert, appears stated age and cooperative  Lungs: clear to auscultation bilaterally, no labored breathing/accessory muscle use  Heart: regular rate and rhythm, S1, S2 normal, no murmur, click, rub or gallop  Abdomen: soft, obese abdomen, 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 noted with no bleeding/melena, non-tender; bowel sounds normal; no masses,  no organomegaly  Extremities: no edema    Invasive Devices  Report    Peripheral Intravenous Line  Duration           Peripheral IV 07/27/22 Left Antecubital 2 days          Drain  Duration           Open Drain Medial RUQ 14 days                Lab, Imaging and other studies: I have personally reviewed pertinent reports      Admission on 07/26/2022   Component Date Value    Ventricular Rate 07/26/2022 65     Atrial Rate 07/26/2022 234     QRSD Interval 07/26/2022 76     QT Interval 07/26/2022 382     QTC Interval 07/26/2022 397     QRS Axis 07/26/2022 42     T Wave Axis 07/26/2022 266     WBC 07/26/2022 4 78     RBC 07/26/2022 2 85 (A)    Hemoglobin 07/26/2022 8 1 (A)    Hematocrit 07/26/2022 27 7 (A)    MCV 07/26/2022 97     MCH 07/26/2022 28 4     MCHC 07/26/2022 29 2 (A)    RDW 07/26/2022 17 5 (A)    MPV 07/26/2022 9 4  Platelets 25/98/0424 236     nRBC 07/26/2022 0     Neutrophils Relative 07/26/2022 54     Immat GRANS % 07/26/2022 0     Lymphocytes Relative 07/26/2022 35     Monocytes Relative 07/26/2022 10     Eosinophils Relative 07/26/2022 1     Basophils Relative 07/26/2022 0     Neutrophils Absolute 07/26/2022 2 55     Immature Grans Absolute 07/26/2022 0 01     Lymphocytes Absolute 07/26/2022 1 68     Monocytes Absolute 07/26/2022 0 49     Eosinophils Absolute 07/26/2022 0 04     Basophils Absolute 07/26/2022 0 01     Sodium 07/26/2022 135     Potassium 07/26/2022 4 0     Chloride 07/26/2022 82 (A)    CO2 07/26/2022 >45 (A)    ANION GAP 07/26/2022      BUN 07/26/2022 8     Creatinine 07/26/2022 0 68     Glucose 07/26/2022 96     Calcium 07/26/2022 7 8 (A)    Corrected Calcium 07/26/2022 9 0     AST 07/26/2022 49 (A)    ALT 07/26/2022 13     Alkaline Phosphatase 07/26/2022 107 (A)    Total Protein 07/26/2022 6 7     Albumin 07/26/2022 2 5 (A)    Total Bilirubin 07/26/2022 1 12 (A)    eGFR 07/26/2022 110     Lipase 07/26/2022 18     pH, Graeme 07/27/2022 7 378     pCO2, [place]eme 07/27/2022 87 6 (A)    pO2, [place]eme 07/27/2022 30 8 (A)    HCO3, [place]eme 07/27/2022 50 4 (A)    Base Excess, Graeme 07/27/2022 21 9     O2 Content, Graeme 07/27/2022 7 2     O2 HGB, VENOUS 07/27/2022 55 0 (A)    Sodium 07/28/2022 134 (A)    Potassium 07/28/2022 3 1 (A)    Chloride 07/28/2022 83 (A)    CO2 07/28/2022 >45 (A)    ANION GAP 07/28/2022      BUN 07/28/2022 8     Creatinine 07/28/2022 0 59 (A)    Glucose 07/28/2022 93     Calcium 07/28/2022 8 3 (A)    eGFR 07/28/2022 117     Magnesium 07/28/2022 1 8 (A)    Sodium 07/29/2022 135     Potassium 07/29/2022 3 1 (A)    Chloride 07/29/2022 84 (A)    CO2 07/29/2022 >45 (A)    ANION GAP 07/29/2022      BUN 07/29/2022 6     Creatinine 07/29/2022 0 56 (A)    Glucose 07/29/2022 100     Glucose, Fasting 07/29/2022 100 (A)    Calcium 07/29/2022 8 1 (A)    eGFR 07/29/2022 119       Latest Reference Range & Units 07/29/22 09:33   Sodium 135 - 147 mmol/L 135   Potassium 3 5 - 5 3 mmol/L 3 1 (L)   Chloride 96 - 108 mmol/L 84 (L)   CO2 21 - 32 mmol/L >45 (HH)   Anion Gap  See Comment   BUN 5 - 25 mg/dL 6   Creatinine 0 60 - 1 30 mg/dL 0 56 (L)   Glucose, Random 65 - 140 mg/dL 100   GLUCOSE FASTING 65 - 99 mg/dL 100 (H)   Calcium 8 4 - 10 2 mg/dL 8 1 (L)   eGFR ml/min/1 73sq m 119   (L): Data is abnormally low  (H): Data is abnormally high  (HH): Data is critically high    Assessment/Plan:    1  Epigastric discomfort with nausea and reported poor tolerance of oral intake; suspected gastroparesis, patient was noted with food bezoar on EGD earlier this year  He is post cholecystectomy as well as gastric sleeve, no evidence of biliary obstruction at this time, no evidence of bowel obstruction clinically or on imaging      - will increase Phenergan dose to 25 mg q 6 hours, patient may benefit from increased dose taking his weight into consideration  Consider trial of Reglan, failing Phenergan    - continue Protonix twice daily    - encourage nutritional intake, may continue with liquid diet for now but can advance if tolerated, can also perform calorie counts    Can time meals with his antiemetic/Phenergan doses

## 2022-07-29 NOTE — PROGRESS NOTES
Consult for PN recs  received   Pt con'ts to gag when trying to eat or drink - including water  Consider esophageal stricture/alchalasia? ??   PO Kcal/fluids negligible x 8 days  Malnutrition risk  Is receiving IVF  PN has risks for pt r/t possible unknown preexsisting hepatic cirrhosis related to morbid obesity and is a refeeding risk - per pt no significant po since 7/21     1)Would prefer to trial EN distal to fistula through fistula opening if appliance will accommodate     j tube initiate Osmolite 1 2 @20 ml/hr  If he tolerates, would then advance toward goal or 100 ml/hr  If he does not tolerate or feeding distal to fistula not possible, or unable to tolerate PO by Monday, will need to initiate PN for nutrition support      2)Would obtain total bili as suspect cirrhosis may be present   3) Obtain current wt

## 2022-07-29 NOTE — OCCUPATIONAL THERAPY NOTE
Occupational Therapy Screen Note        Patient Name: Dagoberto ACOSTA Date: 7/29/2022 07/29/22 0750   OT Last Visit   OT Visit Date 07/29/22   Note Type   Note type Screen   Additional Comments OT orders received and chart review conducted  Per previous documentation, pt is a long term resident of [address] [place] [address] where he requires total (A) with ADLs & IADLs  Pt is primarily bed bound and utilizes mechanical lift for transfers  Upon d/c, plan for pt to return to [address] [place] [address]  Pt currently functioning at baseline level, therefore there are no acute OT needs at this time  Will d/c pt from OT caseload at this time       Kaela Calderón, OTS

## 2022-07-29 NOTE — PROGRESS NOTES
[facility]  Progress Note - Nat Demario 1970, 47 y o  male MRN: [id number]  Unit/Bed#: W -54 Encounter: [id number]  Primary Care Provider: Dina Winkler MD   Date and time admitted to hospital: 7/26/2022 10:55 PM    * Intractable abdominal pain  Assessment & Plan  · Presents with intractable abdominal pain and nausea with vomiting  Has been refusing to eat and nauseous and dry heaving  · No discrete evidence of acute abdominal pathology on CT scan, would continue with conservative management  · General surgery and GI following  · Continue scheduled Phenergan  · Has essentially been NPO  Continue liquid diet  · May need to consider temporary nutrition via PICC line versus surgical intervention  Atrial fibrillation ([facility] [address] )  Assessment & Plan  · Rate controlled with Lopressor  · Continue digoxin and Arixtra  · Monitor heart rate  Chronic diastolic heart failure [facility])  Assessment & Plan  Wt Readings from Last 3 Encounters:   07/23/22 (!) 231 kg (510 lb)   07/19/22 (!) 228 kg (503 lb)   07/19/22 (!) 229 kg (503 lb 15 5 oz)     · Will hold diuretics for now given poor oral intake  · Resume when able  Chronic respiratory failure with hypoxia (HCC)  Assessment & Plan  · Chronically on 2-4 L secondary to CHF, morbid obesity and hypoventilation syndrome  · Monitor O2 status  No shortness of breath reported presently  Appearing comfortable with no respiratory distress  Hypokalemia  Assessment & Plan  · 3 1 today, secondary to poor oral intake  · Potassium containing fluids ordered  · Repeat CMP tomorrow  · Check magnesium tomorrow  History of DVT (deep vein thrombosis)  Assessment & Plan  · Continue Arixtra for now  · Pradaxa on hold as he is essentially NPO    S/p small bowel obstruction  Assessment & Plan  · Had lengthy hospitalization in June of 2022 for SBO  · No evidence of SBO on CT scan    · Surgery evaluated, no inpatient surgical recommendations besides medical management  Morbid obesity ([facility] [address] )  Assessment & Plan  · Body mass index is 71 13 kg/m²  · Will need some dietary modifications as exercise will be difficult for this patient        VTE Pharmacologic Prophylaxis: VTE Score: 4 Moderate Risk (Score 3-4) - Pharmacological DVT Prophylaxis Ordered: argatroban  Patient Centered Rounds: I performed bedside rounds with nursing staff today  Discussions with Specialists or Other Care Team Provider: cm, nursing    Education and Discussions with Family / Patient: Patient declined call to   Time Spent for Care: 30 minutes  More than 50% of total time spent on counseling and coordination of care as described above  Current Length of Stay: 0 day(s)  Current Patient Status: Inpatient   Certification Statement: The patient, admitted on an observation basis, will now require > 2 midnight hospital stay due to Continued poor appetite with electrolyte abnormalities requiring IV hydration and repletion  Discharge Plan: Anticipate discharge in 48-72 hrs to discharge location to be determined pending rehab evaluations  Code Status: Level 1 - Full Code    Subjective:   No overnight events per nursing  Patient is still nauseous  No intake  Denies chest pain  Objective:     Vitals:   Temp (24hrs), Av °F (36 7 °C), Min:97 8 °F (36 6 °C), Max:98 2 °F (36 8 °C)    Temp:  [97 8 °F (36 6 °C)-98 2 °F (36 8 °C)] 98 2 °F (36 8 °C)  HR:  [67-82] 82  Resp:  [17-18] 18  BP: (102-113)/(41-59) 113/59  SpO2:  [92 %-100 %] 99 %  Body mass index is 71 13 kg/m²  Input and Output Summary (last 24 hours): Intake/Output Summary (Last 24 hours) at 2022 1457  Last data filed at 2022 1415  Gross per 24 hour   Intake 2305 ml   Output 320 ml   Net 1985 ml       Physical Exam:   Physical Exam  Vitals and nursing note reviewed  Constitutional:       General: He is not in acute distress  Appearance: Normal appearance  He is obese  Interventions: Nasal cannula in place  Comments: + dry heaving   HENT:      Head: Normocephalic  Mouth/Throat:      Mouth: Mucous membranes are moist    Eyes:      Pupils: Pupils are equal, round, and reactive to light  Cardiovascular:      Rate and Rhythm: Normal rate and regular rhythm  Heart sounds: No murmur heard  Pulmonary:      Effort: Pulmonary effort is normal  No respiratory distress  Breath sounds: Normal breath sounds  No wheezing, rhonchi or rales  Abdominal:      General: Bowel sounds are normal  There is no distension  Palpations: Abdomen is soft  Tenderness: There is abdominal tenderness (diffuse)  There is no guarding  Hernia: No hernia is present  Comments: + EC fistula, greenish yellow drainage  No bleeding   Musculoskeletal:         General: No deformity  Cervical back: Normal range of motion  Right lower leg: Edema present  Left lower leg: Edema present  Skin:     Capillary Refill: Capillary refill takes less than 2 seconds  Neurological:      General: No focal deficit present  Mental Status: He is alert and oriented to person, place, and time  Mental status is at baseline           Additional Data:     Labs:  Results from last 7 days   Lab Units 07/26/22  2335   WBC Thousand/uL 4 78   HEMOGLOBIN g/dL 8 1*   HEMATOCRIT % 27 7*   PLATELETS Thousands/uL 236   NEUTROS PCT % 54   LYMPHS PCT % 35   MONOS PCT % 10   EOS PCT % 1     Results from last 7 days   Lab Units 07/29/22  0933 07/28/22  0457 07/26/22  2335 07/23/22  2053   SODIUM mmol/L 135   < > 135 135   POTASSIUM mmol/L 3 1*   < > 4 0 3 7   CHLORIDE mmol/L 84*   < > 82* 91*   CO2 mmol/L >45*   < > >45* 40*   BUN mg/dL 6   < > 8 8   CREATININE mg/dL 0 56*   < > 0 68 0 65   ANION GAP mmol/L  --   --   --  4   CALCIUM mg/dL 8 1*   < > 7 8* 8 5   ALBUMIN g/dL  --   --  2 5* 2 5*   TOTAL BILIRUBIN mg/dL  --   --  1 12* 0 71   ALK PHOS U/L  --   --  107* 124*   ALT U/L  --   --  13 11   AST U/L  --   --  49* 15   GLUCOSE RANDOM mg/dL 100   < > 96 113    < > = values in this interval not displayed  Results from last 7 days   Lab Units 07/25/22  0705   HEMOGLOBIN A1C % 5 2           Lines/Drains:  Invasive Devices  Report    Peripheral Intravenous Line  Duration           Peripheral IV 07/27/22 Left Antecubital 2 days          Drain  Duration           Open Drain Medial RUQ 14 days                      Imaging: No pertinent imaging reviewed      Recent Cultures (last 7 days):         Last 24 Hours Medication List:   Current Facility-Administered Medications   Medication Dose Route Frequency Provider Last Rate    acetaminophen  975 mg Oral [facility] Charley Nicks, PA-C      aluminum-magnesium hydroxide-simethicone  30 mL Oral Q6H PRN Charley Nicks, PA-C      budesonide-formoterol  2 puff Inhalation BID Charley Nicks, PA-C      buPROPion  150 mg Oral Daily Charley Nicks, PA-C      dextrose 5 % and sodium chloride 0 9 % with KCl 20 mEq/L  75 mL/hr Intravenous Continuous Charley Nicks, PA-C 75 mL/hr (07/29/22 1415)    dicyclomine  20 mg Oral 4x Daily (AC & HS) Charley Nicks, PA-C      digoxin  250 mcg Intravenous Daily Charley Nicks, PA-C      docusate sodium  100 mg Oral BID Charley Nicks, PA-C      famotidine  20 mg Intravenous Q12H [address] Charley Nicks, PA-C      fondaparinux  10 mg Subcutaneous Q24H Charley Nicks, PA-C      gabapentin  300 mg Oral TID Charley Nicks, PA-C      HYDROmorphone  1 mg Intravenous Q4H PRN Mary Klein MD      ipratropium  0 5 mg Nebulization TID Leroy Rust MD      levalbuterol  1 25 mg Nebulization TID Leroy Rust MD      levothyroxine  25 mcg Oral Early Morning Charley Nicks, PA-C      magnesium sulfate  2 g Intravenous Once Charley Nicks, PA-C      methocarbamol  750 mg Oral Q6H [address] Charley Nicks, PA-C      metoprolol  5 mg Intravenous Q8H Charley Nicks, PA-C      ondansetron  4 mg Intravenous Q4H PRN Viki Esparza MD      oxyCODONE  10 mg Oral Q4H PRN Phylicia JIMENA Browning      oxyCODONE  5 mg Oral Q4H PRN Phylicia JIMENA Browning      pantoprazole  40 mg Intravenous Q12H Austen Araya MD      polyethylene glycol  17 g Oral Daily Phylicia Nallely, JIMENA      promethazine  25 mg Intravenous Q8H Remy Pardaa PA-C      QUEtiapine  50 mg Oral HS Phylicia Browning PA-C      sodium chloride  1 spray Each Nare Q1H PRN Phylicia Nallely, PA-C      sodium chloride  2 g Oral TID Phylicia Browning PA-C          Today, Patient Was Seen By: Kandis Louie PA-C    **Please Note: This note may have been constructed using a voice recognition system  **

## 2022-07-29 NOTE — ASSESSMENT & PLAN NOTE
· Presents with intractable abdominal pain and nausea with vomiting  Has been refusing to eat and nauseous and dry heaving  · No discrete evidence of acute abdominal pathology on CT scan, would continue with conservative management  · General surgery and GI following  · Continue scheduled Phenergan  · Has essentially been NPO  Continue liquid diet  · May need to consider temporary nutrition via PICC line versus surgical intervention

## 2022-07-29 NOTE — PROGRESS NOTES
[place] STUDENT  Inpatient Progress Note for TRAINING ONLY  Not Part of Legal Medical Record     Progress Note - Marialuisa Bobo 47 y o  male MRN: [id number]  Unit/Bed#: W -01 Encounter: [id number]        * Intractable abdominal pain  Assessment & Plan  · Presents today with intractable abdominal pain and nausea with vomiting  Refused to eat in the ER  · No discrete evidence of acute abdominal pathology on CT scan, would continue with conservative management  · General surgery evaluated, recommending a GI consult  · Bentyl ordered  · ? EGD  · Clear liquid diet, and ensures  Monitor p o  Intake and nutritional status  · Monitor fistula output  · PT OT evaluations     Atrial fibrillation (HCC)  Assessment & Plan  · Rate controlled with Lopressor  · Continue digoxin and Pradaxa  · Monitor heart rate      Chronic diastolic heart failure [facility])  Assessment & Plan      Wt Readings from Last 3 Encounters:   07/23/22 (!) 231 kg (510 lb)   07/19/22 (!) 228 kg (503 lb)   07/19/22 (!) 229 kg (503 lb 15 5 oz)      · Will hold diuretics for now given poor oral intake  · Resume when able      Chronic respiratory failure with hypoxia (HCC)  Assessment & Plan  · Chronically on 2-4 L secondary to CHF, morbid obesity and hypoventilation syndrome  · Monitor O2 status  No shortness of breath reported presently  Appearing comfortable with no respiratory distress      History of DVT (deep vein thrombosis)  Assessment & Plan  · Continue Pradaxa     S/p small bowel obstruction  Assessment & Plan  · Had lengthy hospitalization in June of 2022 for SBO  · No evidence of SBO on CT scan  · Surgery evaluated, no inpatient surgical recommendations besides medical management      Morbid obesity ([facility] [address] )  Assessment & Plan  · Body mass index is 71 13 kg/m²     · Will need some dietary modifications as exercise will be difficult for this patient        VTE Pharmacologic Prophylaxis: VTE Score: 4 Moderate Risk (Score 3-4) - Pharmacological DVT Prophylaxis Ordered: dabigatran (Pradaxa)      Patient Centered Rounds: I performed bedside rounds with nursing staff today  Discussions with Specialists or Other Care Team Provider: cm, nursing     Education and Discussions with Family / Patient: Patient declined call to        Time Spent for Care: 30 minutes  More than 50% of total time spent on counseling and coordination of care as described above      Current Length of Stay: 1 day(s)  Current Patient Status: Observation   Certification Statement: The patient, admitted on an observation basis, will now require > 2 midnight hospital stay due to Intractable abdominal pain with nausea  Discharge Plan: Anticipate discharge in 24-48 hrs to prior assisted or independent living facility      Code Status: Level 1 - Full Code      Subjective:   No acute events overnight  Patient reports his nausea is slightly improved  Tolerating very little p o  Passing flatus  Having bowel movements  Patient has fever, chills, rashes, chest pain, shortness a breath, difficulty breathing, vomiting, diarrhea, headaches, or dizziness  Objective:     Vitals:   Temp (24hrs), Av °F (36 7 °C), Min:97 8 °F (36 6 °C), Max:98 2 °F (36 8 °C)    Temp:  [97 8 °F (36 6 °C)-98 2 °F (36 8 °C)] 98 2 °F (36 8 °C)  HR:  [67-82] 82  Resp:  [17-18] 18  BP: (102-113)/(41-59) 113/59  SpO2:  [92 %-100 %] 100 %  Body mass index is 71 13 kg/m²  Input and Output Summary (last 24 hours): Intake/Output Summary (Last 24 hours) at 2022 0942  Last data filed at 2022 0837  Gross per 24 hour   Intake 1205 ml   Output 320 ml   Net 885 ml       Physical Exam:     Physical Exam:   Gen:  NAD  HEENT: NCAT  MMM  CV: well perfused  Lungs: Normal respiratory effort  Abd: soft, nt/nd, fistula with some formed stool  Skin: warm/ dry  Extremities: no peripheral edema, no clubbing or cyanosis, L thigh dressing changed  Neuro:  AxO x3    Historical Information   Past Medical History: Diagnosis Date    Afib ([facility] )     Anemia     Anxiety     Cancer ([facility] )     Cardiac disease     Cellulitis     COPD (chronic obstructive pulmonary disease) (HCC)     Depression     Diabetes mellitus ([facility] )     DVT (deep venous thrombosis) (HCC)     GERD (gastroesophageal reflux disease)     HBP (high blood pressure)     Heart failure (HCC)     Hx of blood clots     Hypertension     Hypokalemia     Hypothyroid     Obesity     Psychiatric disorder      Past Surgical History:   Procedure Laterality Date    ABDOMINAL HERNIA REPAIR  05/2019    CHOLECYSTECTOMY      Lap    GASTRECTOMY SLEEVE LAPAROSCOPIC  08/2018    INCISION AND DRAINAGE OF WOUND Bilateral 12/01/2021    Procedure: INCISION AND DRAINAGE (I&D) HEAD/FACE;  Surgeon: Ihsan Mckinney MD;  Location:  MAIN OR;  Service: General    IR PICC PLACEMENT DOUBLE LUMEN  06/13/2022    IVC FILTER INSERTION  2018    KNEE SURGERY Right     open wound, injury     LAPAROTOMY N/A 6/14/2022    Procedure: LAPAROTOMY EXPLORATORY, EXTENSIVE LYSIS OF ADHESIONS, APPLICATION OF ABTHERA WOUND VAC;  Surgeon: Florinda Lin MD;  Location: [address] [place] [address];  Service: General    LAPAROTOMY N/A 6/15/2022    Procedure: LAPAROTOMY EXPLORATORY WITH REPAIR OF SEROSAL INJURY;  Surgeon: Deirdre Bowden MD;  Location:  MAIN OR;  Service: General    LEG SURGERY Right 2009    SPLIT THICKNESS SKIN GRAFT N/A 6/30/2022    Procedure: incision and drainage, wash out vac change;  Surgeon: Christiano Guerrero DO;  [facility];  Service: General    SPLIT THICKNESS SKIN GRAFT N/A 7/5/2022    Procedure: PREPERATION RECIPIENT STSG SITE, DEBRIDEMENT NONVIABLE SKIN EDGE; PREPERATION RECIPIENT SKIN GRAFT SITE; APPLICATION OF SKIN SUBSTITUTE TO DONOR SITE; APPLICATION WOUND VAC X2 GREATER THAN 50CM;   Surgeon: Juan Miguel Lugo DO;  [facility];  Service: General    US GUIDED VASCULAR ACCESS  08/06/2018    VAC DRESSING APPLICATION N/A 3/29/5150    Procedure: CHANGE DRESSING/VAC ABDOMEN;  Surgeon: Rosy Mariano MD;  [facility];  Service: General    VAC DRESSING APPLICATION N/A 6/87/3568    Procedure: ABDOMINAL WASHOUT, REPAIR OF SEROSAL TEARS,BRIDING VICRYL MESH, PARTIAL CLOSURE, APPLICATION OF WOUND VAC;  Surgeon: Maddi Montalvo DO;  [facility];  Service: General    VAC DRESSING APPLICATION N/A 9/02/8584    Procedure: CHANGE DRESSING/VAC ABDOMEN;  Surgeon: Maddi Montalvo DO;  [facility];  Service: General     Social History   Social History     Substance and Sexual Activity   Alcohol Use Not Currently     Social History     Substance and Sexual Activity   Drug Use No     Social History     Tobacco Use   Smoking Status Former Smoker    Packs/day: 1 00    Years: 15 00    Pack years: 15 00   Smokeless Tobacco Never Used   Tobacco Comment    1 5ppd x 23 years, quit 2014     Family History:   Family History   Problem Relation Age of Onset   Parvez Zelaya Lung cancer Father     Diabetes Maternal Aunt     Heart attack Mother     Clotting disorder Mother     Hypertension Mother     Heart failure Mother     Diabetes Mother     Atrial fibrillation Mother     Sleep apnea Mother     Obesity Mother     Asthma Sister     Stroke Neg Hx     Anuerysm Neg Hx     Arrhythmia Neg Hx     Hyperlipidemia Neg Hx         pt unsure    Fainting Neg Hx        Meds/Allergies   all medications and allergies reviewed  Allergies   Allergen Reactions    Penicillins Hives       Additional Data:     Labs:    Results from last 7 days   Lab Units 07/26/22  2335   WBC Thousand/uL 4 78   HEMOGLOBIN g/dL 8 1*   HEMATOCRIT % 27 7*   PLATELETS Thousands/uL 236   NEUTROS PCT % 54   LYMPHS PCT % 35   MONOS PCT % 10   EOS PCT % 1     Results from last 7 days   Lab Units 07/28/22  0457 07/26/22  2335 07/23/22  2053   SODIUM mmol/L 134* 135 135   POTASSIUM mmol/L 3 1* 4 0 3 7   CHLORIDE mmol/L 83* 82* 91*   CO2 mmol/L >45* >45* 40*   BUN mg/dL 8 8 8 CREATININE mg/dL 0 59* 0 68 0 65   ANION GAP mmol/L  --   --  4   CALCIUM mg/dL 8 3* 7 8* 8 5   ALBUMIN g/dL  --  2 5* 2 5*   TOTAL BILIRUBIN mg/dL  --  1 12* 0 71   ALK PHOS U/L  --  107* 124*   ALT U/L  --  13 11   AST U/L  --  49* 15   GLUCOSE RANDOM mg/dL 93 96 113             Results from last 7 days   Lab Units 07/25/22  0705   HEMOGLOBIN A1C % 5 2             * I Have Reviewed All Lab Data Listed Above  * Additional Pertinent Lab Tests Reviewed:  [facility] Admission Reviewed    Imaging:    Imaging Personally Reviewed by Myself Includes:  CXR, CT abdomen pelvis with contrast    Recent Cultures (last 7 days):           Last 24 Hours Medication List:   Current Facility-Administered Medications   Medication Dose Route Frequency Provider Last Rate    acetaminophen  975 mg Oral [facility] Jm Ahn PA-C      aluminum-magnesium hydroxide-simethicone  30 mL Oral Q6H PRN Jm Ahn PA-C      budesonide-formoterol  2 puff Inhalation BID Jm Ahn PA-C      buPROPion  150 mg Oral Daily Jm Ahn PA-C      dabigatran etexilate  150 mg Oral BID Jm Ahn PA-C      dicyclomine  20 mg Oral 4x Daily (AC & HS) Jm Ahn PA-C      digoxin  250 mcg Oral Daily Jm Ahn PA-C      docusate sodium  100 mg Oral BID Jm Ahn PA-C      famotidine  20 mg Oral Daily Jm Ahn PA-C      gabapentin  300 mg Oral TID Jm Ahn PA-C      HYDROmorphone  1 mg Intravenous Q4H PRN Karis Wellington MD      ipratropium  0 5 mg Nebulization TID Lauren Painter MD      levalbuterol  1 25 mg Nebulization TID Lauren Painter MD      levothyroxine  25 mcg Oral Early Morning Jm Ahn PA-C      methocarbamol  750 mg Oral Q6H [address] Jm Ahn PA-C      metoprolol tartrate  25 mg Oral Q8H Jm Ahn PA-C      multi-electrolyte  75 mL/hr Intravenous Continuous Lauren Painter MD 75 mL/hr (07/29/22 0753)    ondansetron  4 mg Intravenous Q4H PRN Sanjuanita Muñoz MD      oxyCODONE  10 mg Oral Q4H PRN Dada Alberts PA-C      oxyCODONE  5 mg Oral Q4H PRN Dada Alberts PA-C      pantoprazole  40 mg Intravenous Q12H Austen Araya MD      polyethylene glycol  17 g Oral Daily Dada Alberts PA-C      potassium chloride  20 mEq Oral Daily Dada Alberts PA-C      promethazine  12 5 mg Intravenous Q8H Meiarden Parada PA-C      QUEtiapine  50 mg Oral HS Dada Alberts PA-C      sodium chloride  1 spray Each Nare Q1H PRN Dada Alberts PA-C      sodium chloride  2 g Oral TID Dada Alberts PA-C          Today, Patient Was Seen By: JASWINDER Ortez    ** Please Note: Dictation voice to text software may have been used in the creation of this document   **

## 2022-07-29 NOTE — PHYSICAL THERAPY NOTE
PHYSICAL THERAPY SCREEN NOTE          Patient Name: Serina FAUST Date: 7/29/2022 07/29/22 0805   PT Last Visit   PT Visit Date 07/29/22   Note Type   Note type Screen   Additional Comments PT orders received, chart review performed  Per chart review, pt is a long-term resident of [facility], is bed-bound, and dependent for functional mobility w/ use of a frankie-lift  Will screen and DC pt from Inpatient PT caseload due to pt w/o acute PT needs           Kandra Dandy, PT, DPT  07/29/22

## 2022-07-30 LAB
ALBUMIN SERPL BCP-MCNC: 2.4 G/DL (ref 3.5–5)
ALP SERPL-CCNC: 120 U/L (ref 34–104)
ALT SERPL W P-5'-P-CCNC: 12 U/L (ref 7–52)
AST SERPL W P-5'-P-CCNC: 30 U/L (ref 13–39)
BASOPHILS # BLD AUTO: 0.02 THOUSANDS/ΜL (ref 0–0.1)
BASOPHILS NFR BLD AUTO: 0 % (ref 0–1)
BILIRUB SERPL-MCNC: 1.35 MG/DL (ref 0.2–1)
BUN SERPL-MCNC: 6 MG/DL (ref 5–25)
CALCIUM ALBUM COR SERPL-MCNC: 9.6 MG/DL (ref 8.3–10.1)
CALCIUM SERPL-MCNC: 8.3 MG/DL (ref 8.4–10.2)
CHLORIDE SERPL-SCNC: 85 MMOL/L (ref 96–108)
CO2 SERPL-SCNC: >45 MMOL/L (ref 21–32)
CREAT SERPL-MCNC: 0.61 MG/DL (ref 0.6–1.3)
EOSINOPHIL # BLD AUTO: 0.07 THOUSAND/ΜL (ref 0–0.61)
EOSINOPHIL NFR BLD AUTO: 2 % (ref 0–6)
ERYTHROCYTE [DISTWIDTH] IN BLOOD BY AUTOMATED COUNT: 17.9 % (ref 11.6–15.1)
GFR SERPL CREATININE-BSD FRML MDRD: 115 ML/MIN/1.73SQ M
GLUCOSE SERPL-MCNC: 132 MG/DL (ref 65–140)
HCT VFR BLD AUTO: 28.7 % (ref 36.5–49.3)
HGB BLD-MCNC: 8 G/DL (ref 12–17)
IMM GRANULOCYTES # BLD AUTO: 0.01 THOUSAND/UL (ref 0–0.2)
IMM GRANULOCYTES NFR BLD AUTO: 0 % (ref 0–2)
LYMPHOCYTES # BLD AUTO: 1.46 THOUSANDS/ΜL (ref 0.6–4.47)
LYMPHOCYTES NFR BLD AUTO: 32 % (ref 14–44)
MAGNESIUM SERPL-MCNC: 2.1 MG/DL (ref 1.9–2.7)
MCH RBC QN AUTO: 28 PG (ref 26.8–34.3)
MCHC RBC AUTO-ENTMCNC: 27.9 G/DL (ref 31.4–37.4)
MCV RBC AUTO: 100 FL (ref 82–98)
MONOCYTES # BLD AUTO: 0.62 THOUSAND/ΜL (ref 0.17–1.22)
MONOCYTES NFR BLD AUTO: 14 % (ref 4–12)
NEUTROPHILS # BLD AUTO: 2.37 THOUSANDS/ΜL (ref 1.85–7.62)
NEUTS SEG NFR BLD AUTO: 52 % (ref 43–75)
NRBC BLD AUTO-RTO: 0 /100 WBCS
PHOSPHATE SERPL-MCNC: 2.7 MG/DL (ref 2.7–4.5)
PLATELET # BLD AUTO: 203 THOUSANDS/UL (ref 149–390)
PMV BLD AUTO: 9.4 FL (ref 8.9–12.7)
POTASSIUM SERPL-SCNC: 3.4 MMOL/L (ref 3.5–5.3)
PROT SERPL-MCNC: 6.4 G/DL (ref 6.4–8.4)
RBC # BLD AUTO: 2.86 MILLION/UL (ref 3.88–5.62)
SODIUM SERPL-SCNC: 135 MMOL/L (ref 135–147)
WBC # BLD AUTO: 4.55 THOUSAND/UL (ref 4.31–10.16)

## 2022-07-30 PROCEDURE — 83735 ASSAY OF MAGNESIUM: CPT | Performed by: PHYSICIAN ASSISTANT

## 2022-07-30 PROCEDURE — 99232 SBSQ HOSP IP/OBS MODERATE 35: CPT | Performed by: SURGERY

## 2022-07-30 PROCEDURE — 80053 COMPREHEN METABOLIC PANEL: CPT | Performed by: PHYSICIAN ASSISTANT

## 2022-07-30 PROCEDURE — 94660 CPAP INITIATION&MGMT: CPT

## 2022-07-30 PROCEDURE — 99232 SBSQ HOSP IP/OBS MODERATE 35: CPT | Performed by: INTERNAL MEDICINE

## 2022-07-30 PROCEDURE — 93005 ELECTROCARDIOGRAM TRACING: CPT

## 2022-07-30 PROCEDURE — 85025 COMPLETE CBC W/AUTO DIFF WBC: CPT | Performed by: PHYSICIAN ASSISTANT

## 2022-07-30 PROCEDURE — C9113 INJ PANTOPRAZOLE SODIUM, VIA: HCPCS | Performed by: INTERNAL MEDICINE

## 2022-07-30 PROCEDURE — 84100 ASSAY OF PHOSPHORUS: CPT | Performed by: PHYSICIAN ASSISTANT

## 2022-07-30 PROCEDURE — 94760 N-INVAS EAR/PLS OXIMETRY 1: CPT

## 2022-07-30 PROCEDURE — 94640 AIRWAY INHALATION TREATMENT: CPT

## 2022-07-30 RX ORDER — PROMETHAZINE HYDROCHLORIDE 25 MG/ML
25 INJECTION, SOLUTION INTRAMUSCULAR; INTRAVENOUS EVERY 8 HOURS PRN
Status: DISCONTINUED | OUTPATIENT
Start: 2022-07-30 | End: 2022-07-30

## 2022-07-30 RX ORDER — METOCLOPRAMIDE HYDROCHLORIDE 5 MG/ML
10 INJECTION INTRAMUSCULAR; INTRAVENOUS EVERY 6 HOURS SCHEDULED
Status: DISCONTINUED | OUTPATIENT
Start: 2022-07-30 | End: 2022-07-30

## 2022-07-30 RX ORDER — METOCLOPRAMIDE HYDROCHLORIDE 5 MG/ML
10 INJECTION INTRAMUSCULAR; INTRAVENOUS
Status: DISCONTINUED | OUTPATIENT
Start: 2022-07-30 | End: 2022-08-03 | Stop reason: HOSPADM

## 2022-07-30 RX ADMIN — LEVALBUTEROL HYDROCHLORIDE 1.25 MG: 1.25 SOLUTION, CONCENTRATE RESPIRATORY (INHALATION) at 14:37

## 2022-07-30 RX ADMIN — METOCLOPRAMIDE 10 MG: 5 INJECTION, SOLUTION INTRAMUSCULAR; INTRAVENOUS at 12:30

## 2022-07-30 RX ADMIN — PANTOPRAZOLE SODIUM 40 MG: 40 INJECTION, POWDER, FOR SOLUTION INTRAVENOUS at 08:35

## 2022-07-30 RX ADMIN — LEVALBUTEROL HYDROCHLORIDE 1.25 MG: 1.25 SOLUTION, CONCENTRATE RESPIRATORY (INHALATION) at 07:57

## 2022-07-30 RX ADMIN — QUETIAPINE FUMARATE 50 MG: 25 TABLET ORAL at 21:27

## 2022-07-30 RX ADMIN — GABAPENTIN 300 MG: 250 SOLUTION ORAL at 10:54

## 2022-07-30 RX ADMIN — BUDESONIDE AND FORMOTEROL FUMARATE DIHYDRATE 2 PUFF: 160; 4.5 AEROSOL RESPIRATORY (INHALATION) at 08:35

## 2022-07-30 RX ADMIN — LEVALBUTEROL HYDROCHLORIDE 1.25 MG: 1.25 SOLUTION, CONCENTRATE RESPIRATORY (INHALATION) at 20:27

## 2022-07-30 RX ADMIN — IPRATROPIUM BROMIDE 0.5 MG: 0.5 SOLUTION RESPIRATORY (INHALATION) at 14:37

## 2022-07-30 RX ADMIN — IPRATROPIUM BROMIDE 0.5 MG: 0.5 SOLUTION RESPIRATORY (INHALATION) at 07:57

## 2022-07-30 RX ADMIN — DICYCLOMINE HYDROCHLORIDE 20 MG: 20 TABLET ORAL at 21:27

## 2022-07-30 RX ADMIN — PROMETHAZINE HYDROCHLORIDE 25 MG: 25 INJECTION, SOLUTION INTRAMUSCULAR; INTRAVENOUS at 05:28

## 2022-07-30 RX ADMIN — HYDROMORPHONE HYDROCHLORIDE 1 MG: 1 INJECTION, SOLUTION INTRAMUSCULAR; INTRAVENOUS; SUBCUTANEOUS at 09:52

## 2022-07-30 RX ADMIN — FAMOTIDINE 20 MG: 10 INJECTION, SOLUTION INTRAVENOUS at 08:35

## 2022-07-30 RX ADMIN — DOCUSATE SODIUM 100 MG: 100 CAPSULE, LIQUID FILLED ORAL at 17:37

## 2022-07-30 RX ADMIN — DEXTROSE, SODIUM CHLORIDE, AND POTASSIUM CHLORIDE 75 ML/HR: 5; .9; .15 INJECTION INTRAVENOUS at 09:56

## 2022-07-30 RX ADMIN — OXYCODONE HYDROCHLORIDE 5 MG: 5 TABLET ORAL at 17:38

## 2022-07-30 RX ADMIN — POLYETHYLENE GLYCOL 3350 17 G: 17 POWDER, FOR SOLUTION ORAL at 08:35

## 2022-07-30 RX ADMIN — DICYCLOMINE HYDROCHLORIDE 20 MG: 20 TABLET ORAL at 17:37

## 2022-07-30 RX ADMIN — DICYCLOMINE HYDROCHLORIDE 20 MG: 20 TABLET ORAL at 12:30

## 2022-07-30 RX ADMIN — HYDROMORPHONE HYDROCHLORIDE 1 MG: 1 INJECTION, SOLUTION INTRAMUSCULAR; INTRAVENOUS; SUBCUTANEOUS at 05:28

## 2022-07-30 RX ADMIN — DOCUSATE SODIUM 100 MG: 100 CAPSULE, LIQUID FILLED ORAL at 08:35

## 2022-07-30 RX ADMIN — OXYCODONE HYDROCHLORIDE 5 MG: 5 TABLET ORAL at 08:35

## 2022-07-30 RX ADMIN — METOROPROLOL TARTRATE 5 MG: 5 INJECTION, SOLUTION INTRAVENOUS at 02:19

## 2022-07-30 RX ADMIN — GABAPENTIN 300 MG: 250 SOLUTION ORAL at 17:36

## 2022-07-30 RX ADMIN — DIGOXIN 250 MCG: 0.25 INJECTION INTRAMUSCULAR; INTRAVENOUS at 08:36

## 2022-07-30 RX ADMIN — FONDAPARINUX SODIUM 10 MG: 10 INJECTION, SOLUTION SUBCUTANEOUS at 12:30

## 2022-07-30 RX ADMIN — IPRATROPIUM BROMIDE 0.5 MG: 0.5 SOLUTION RESPIRATORY (INHALATION) at 20:27

## 2022-07-30 RX ADMIN — BUDESONIDE AND FORMOTEROL FUMARATE DIHYDRATE 2 PUFF: 160; 4.5 AEROSOL RESPIRATORY (INHALATION) at 17:36

## 2022-07-30 RX ADMIN — GABAPENTIN 300 MG: 250 SOLUTION ORAL at 20:49

## 2022-07-30 RX ADMIN — BUPROPION HYDROCHLORIDE 150 MG: 150 TABLET, FILM COATED, EXTENDED RELEASE ORAL at 08:35

## 2022-07-30 NOTE — ASSESSMENT & PLAN NOTE
· History of QT prolongation  · Will check baseline EKG to assess as he is on both Reglan and Phenergan

## 2022-07-30 NOTE — PROGRESS NOTES
Progress Note - General Surgery   Peace Aguilera 47 y o  male MRN: [id number]  Unit/Bed#: W -43 Encounter: [id number]    Assessment:  Patient is a 47 y o  male w/ BMI of 70 PMH of SBO and large ventral hernia s/p Ex lap, repair of serosal tears, bridging mesh, STSG (June-July 2022)  w/  Known EC fistula p/w decreased PO intake  No evidence of obstruction      Plan:  · Full liquids, ensures  ? Monitor PO intake, nutritional status  · I/Os- Please record fistula output  · Appreciate GI input  ? Dysmotility vs bezoar  ? Phenergan, Zofran  · Local Wound Care to STSG site and abdominal wound  · PT/OT  · Please [employer] on call [facility] Surgery Role with any questions       Subjective/Objective     Subjective:  No acute events overnight  Patient states his abdominal pain is the same as yesterday  He endorses nausea but denies vomiting and fevers  He does endorse chills and is currently passing flatus without difficulty  He is producing urine and there is minimal output from his ostomy  Objective:     Blood pressure 108/56, pulse 78, temperature (!) 97 3 °F (36 3 °C), temperature source Oral, resp  rate 18, height 5' 11" (1 803 m), SpO2 100 %  ,Body mass index is 71 13 kg/m²  Intake/Output Summary (Last 24 hours) at 7/30/2022 0537  Last data filed at 7/29/2022 1652  Gross per 24 hour   Intake 718 75 ml   Output 350 ml   Net 368 75 ml       Invasive Devices  Report    Peripheral Intravenous Line  Duration           Peripheral IV 07/27/22 Left Antecubital 3 days          Drain  Duration           Open Drain Medial RUQ 15 days                Physical Exam  Vitals and nursing note reviewed  Constitutional:       Appearance: He is well-developed  He is obese  HENT:      Head: Normocephalic and atraumatic  Eyes:      Conjunctiva/sclera: Conjunctivae normal    Cardiovascular:      Rate and Rhythm: Normal rate and regular rhythm  Heart sounds: No murmur heard    Pulmonary:      Effort: No respiratory distress  Abdominal:      Tenderness: There is abdominal tenderness in the right upper quadrant and right lower quadrant  Comments: Minimal output from ostomy, mainly bowel sweat  Musculoskeletal:      Cervical back: Neck supple  Comments: L leg wound producing green purulent fluid  Skin:     General: Skin is warm and dry              Scheduled Meds:  Current Facility-Administered Medications   Medication Dose Route Frequency Provider Last Rate    acetaminophen  975 mg Oral [facility] Manuel Lewis PA-C      aluminum-magnesium hydroxide-simethicone  30 mL Oral Q6H PRN Manuel Lewis PA-C      budesonide-formoterol  2 puff Inhalation BID Manuel Lewis PA-C      buPROPion  150 mg Oral Daily Manuel Lewis PA-C      dextrose 5 % and sodium chloride 0 9 % with KCl 20 mEq/L  75 mL/hr Intravenous Continuous Manuel Lewis PA-C 75 mL/hr (07/29/22 1415)    dicyclomine  20 mg Oral 4x Daily (AC & HS) Manuel Lewis PA-C      digoxin  250 mcg Intravenous Daily Manuel Lewis PA-C      docusate sodium  100 mg Oral BID Manuel Lewis PA-C      famotidine  20 mg Intravenous Q12H [address] Manuel Lewis PA-C      fondaparinux  10 mg Subcutaneous Q24H Manuel Lewis PA-C      gabapentin  300 mg Oral TID Manuel Lewis PA-C      HYDROmorphone  1 mg Intravenous Q4H PRN Chidi Hardwick MD      ipratropium  0 5 mg Nebulization TID Seema Ward MD      levalbuterol  1 25 mg Nebulization TID Seema Ward MD      levothyroxine  25 mcg Oral Early Morning Manuel Lewis PA-C      methocarbamol  750 mg Oral Q6H [address] Manuel Lewis PA-C      metoprolol  5 mg Intravenous Q8H Manuel Lewis PA-C      ondansetron  4 mg Intravenous Q4H PRN Prudencrenay Mckeon MD      oxyCODONE  10 mg Oral Q4H PRN Manuel Lewis PA-C      oxyCODONE  5 mg Oral Q4H PRN Manuel Lewis PA-C      pantoprazole  40 mg Intravenous Q12H Austen Araya MD      polyethylene glycol  17 g Oral Daily Anice Catching, PA-C      promethazine  25 mg Intravenous [address], PA-C      QUEtiapine  50 mg Oral HS Anice Catching, PA-C      sodium chloride  1 spray Each Nare Q1H PRN Anice Catching, PA-C      sodium chloride  2 g Oral TID Anice Catching, PA-C       Continuous Infusions:dextrose 5 % and sodium chloride 0 9 % with KCl 20 mEq/L, 75 mL/hr, Last Rate: 75 mL/hr (07/29/22 1415)      PRN Meds:   aluminum-magnesium hydroxide-simethicone    HYDROmorphone    ondansetron    oxyCODONE    oxyCODONE    sodium chloride      Lab, Imaging and other studies:  I have personally reviewed pertinent lab results    , CBC: No results found for: WBC, HGB, HCT, MCV, PLT, ADJUSTEDWBC, MCH, MCHC, RDW, MPV, NRBC, CMP:   Lab Results   Component Value Date    SODIUM 135 07/29/2022    K 3 1 (L) 07/29/2022    CL 84 (L) 07/29/2022    CO2 >45 (HH) 07/29/2022    BUN 6 07/29/2022    CREATININE 0 56 (L) 07/29/2022    CALCIUM 8 1 (L) 07/29/2022    EGFR 119 07/29/2022   , Coagulation: No results found for: PT, INR, APTT, Urinalysis: No results found for: COLORU, CLARITYU, SPECGRAV, PHUR, LEUKOCYTESUR, NITRITE, PROTEINUA, GLUCOSEU, KETONESU, BILIRUBINUR, BLOODU, Amylase: No results found for: AMYLASE, Lipase: No results found for: LIPASE  VTE Pharmacologic Prophylaxis: Fondaparinux (Arixtra)  VTE Mechanical Prophylaxis: sequential compression device      Royal Sun MD  7/30/2022 6:21 AM

## 2022-07-30 NOTE — ASSESSMENT & PLAN NOTE
· Had lengthy hospitalization in June of 2022 for SBO  · No evidence of SBO on CT scan  · Surgery evaluated, no inpatient surgical interventions besides medical management

## 2022-07-30 NOTE — ASSESSMENT & PLAN NOTE
· 3 1 today, secondary to poor oral intake  · Potassium containing fluids ordered  · Trend CMP and mag

## 2022-07-30 NOTE — CONSULTS
Consult Note - Wound   Priscila Mora 47 y o  male MRN: [id number]  Unit/Bed#: W -07 Encounter: [id number]      Assessment:   Wound care nurse for management of chronic enterocutaneous fistula  Patient weighs 510 lb  On [facility] bariatric bed with low air loss air feature  Large abdominal wound  On right side of abdominal wound is area of fistula  Patient unable to show me exactly where fistula is draining  "I don't know'  Patient unable to tell me how nursing staff at [facility] changes fistula  Upon my arrival, a small sized tear drop fistula manager is present on right side of abdomen  Patient states this has been on for seven days  Scant amount of liquid fistula drainage in bedside drainage bag  While I had the fistula manager off, I did not see any fresh drainage from fistula area  Treatment performed:  1  Right side of abdominal wound cleansed with normal saline  Periwound skin cleansed  Cavilon Advanced Skin Protectant (CASP) applied to periwound  I then placed 4" barrier rings around the wound  The barrier rings touched the abdominal wound base at the medial aspect of the wound at the midline section of the wound  A small teardrop Pj wound manager was then cut and then placed over fistula area  Patient then held his hand over wound manager to promote adherence  Wound/Skin Care Plan:   1  Continue to change wound manager over fistula every 7 days and as needed  Extra wound managers left at bedside  2  To change wound manager: Remove with alcohol free adhesive remover wipes  Then cleanse with warm water  Apply 1 wand of Cavilon Advanced Skin Protectant to periwound  Then place 4" barrier rings circumferentially  around the fistula  Some of the barrier rings will need to be placed on the medial, anterior wound  The wound manager will be placed on top of this barrier ring  Hold hand over wound manager for 3-4 minutes to promose wound adherence   May connect to bedside drainage bag                     Follow these steps to apply Pj Wound Manager:

## 2022-07-30 NOTE — PROGRESS NOTES
Progress Note- Lenin Giraldo 47 y o  male MRN: [id number]    Unit/Bed#: W -01 Encounter: [id number]      Assessment and Plan:    1  Epigastric discomfort with nausea and reported poor tolerance of oral intake; suspected gastroparesis, patient was noted with food bezoar on EGD earlier this year  He is post cholecystectomy as well as gastric sleeve, no evidence of biliary obstruction at this time, no evidence of bowel obstruction clinically or on imaging  Patient reports nausea, continued poor appetite this morning  Denies any recent vomiting  According to chart, weight has been stable for the past 3 months-5 await lb on 4/9 and 510 lb on 7/23 and he is not interested in feeding tube which wouldn't be able to be placed endoscopically in any case due to BMI     - Discontinue Phenergan  Start Reglan 10 mg t i d  prior to meals  Continue Zofran as needed for breakthrough nausea     - Continue Protonix twice daily     - Encourage nutritional intake, may continue with liquid diet for now but can advance to gastroparesis diet with small meals, with low-fat, low-fiber/low residue modifiers, can also perform calorie counts          ______________________________________________________________________    Subjective:      Patient reports nausea, continued poor appetite this morning  Denies any recent vomiting  Not interested in feeding tube placement      Medication Administration - last 24 hours from 07/29/2022 0922 to 07/30/2022 4565       Date/Time Order Dose Route Action Action by     07/30/2022 0835 sodium chloride tablet 2 g 2 g Oral Refused [facility]     07/29/2022 2218 sodium chloride tablet 2 g 2 g Oral Refused [facility], RN     07/29/2022 1542 sodium chloride tablet 2 g 2 g Oral Refused Claudell Qualia, LPN     03/02/9203 7910 QUEtiapine (SEROquel) tablet 50 mg 50 mg Oral Refused [facility], RN     07/30/2022 0835 polyethylene glycol (MIRALAX) packet 17 g 17 g Oral Given [facility] 07/30/2022 0506 methocarbamol (ROBAXIN) tablet 750 mg 750 mg Oral Refused Luisa Che RN     07/29/2022 2353 methocarbamol (ROBAXIN) tablet 750 mg 750 mg Oral Refused [facility], RN     07/29/2022 1727 methocarbamol (ROBAXIN) tablet 750 mg 750 mg Oral Refused Naren Liu LPN     14/33/2433 6766 methocarbamol (ROBAXIN) tablet 750 mg 750 mg Oral Refused Naren Liu LPN     00/28/8523 4398 levothyroxine tablet 25 mcg 25 mcg Oral Refused Luisa Che RN     07/29/2022 2216 gabapentin (NEURONTIN) oral solution 300 mg 300 mg Oral Refused [facility], RN     07/29/2022 1541 gabapentin (NEURONTIN) oral solution 300 mg 300 mg Oral Refused Naren Liu LPN     03/82/9506 3617 docusate sodium (COLACE) capsule 100 mg 100 mg Oral Given Molly Norwood     07/29/2022 1726 docusate sodium (COLACE) capsule 100 mg 100 mg Oral Refused Naren Liu LPN     78/31/4947 6399 dicyclomine (BENTYL) tablet 20 mg 20 mg Oral Refused Luisa Che RN     07/29/2022 2215 dicyclomine (BENTYL) tablet 20 mg 20 mg Oral Refused [facility], RN     07/29/2022 1541 dicyclomine (BENTYL) tablet 20 mg 20 mg Oral Refused Naren Liu LPN     93/19/1500 0324 dicyclomine (BENTYL) tablet 20 mg 20 mg Oral Refused Naren Liu LPN     01/15/4062 6781 budesonide-formoterol (SYMBICORT) 160-4 5 mcg/act inhaler 2 puff 2 puff Inhalation Given Molly Norwood     07/29/2022 1726 budesonide-formoterol (SYMBICORT) 160-4 5 mcg/act inhaler 2 puff 2 puff Inhalation Refused Naren Liu LPN     38/42/8458 1637 acetaminophen (TYLENOL) tablet 975 mg 975 mg Oral Refused Luisa Che RN     07/29/2022 2215 acetaminophen (TYLENOL) tablet 975 mg 975 mg Oral Refused [facility], RN     07/29/2022 1418 acetaminophen (TYLENOL) tablet 975 mg 975 mg Oral Refused Brandon Perez LPN     36/77/2089 5235 pantoprazole (PROTONIX) injection 40 mg 0 mg Intravenous [facility]     07/29/2022 2138 pantoprazole (PROTONIX) injection 40 mg 40 mg Intravenous Given [employer], RN     07/30/2022 0757 levalbuterol Ember Love) inhalation solution 1 25 mg 1 25 mg Nebulization Given Tabatha Aleman, RT     07/29/2022 1955 levalbuterol (XOPENEX) inhalation solution 1 25 mg 1 25 mg Nebulization Given [facility], RT     07/29/2022 1338 levalbuterol (XOPENEX) inhalation solution 1 25 mg 1 25 mg Nebulization Given Tabatha Aleman, RT     07/30/2022 0757 ipratropium (ATROVENT) 0 02 % inhalation solution 0 5 mg 0 5 mg Nebulization Given [facility] Aleman, RT     07/29/2022 1955 ipratropium (ATROVENT) 0 02 % inhalation solution 0 5 mg 0 5 mg Nebulization Given [facility], RT     07/29/2022 1338 ipratropium (ATROVENT) 0 02 % inhalation solution 0 5 mg 0 5 mg Nebulization Given [facility] Aleman, RT     07/30/2022 0835 oxyCODONE (ROXICODONE) IR tablet 5 mg 5 mg Oral Given Cindra Gray     07/30/2022 0835 buPROPion (WELLBUTRIN XL) 24 hr tablet 150 mg 150 mg Oral Given Cindra Gray     07/30/2022 0528 HYDROmorphone (DILAUDID) injection 1 mg 1 mg Intravenous Given Peng Diamond, RN     07/29/2022 2138 HYDROmorphone (DILAUDID) injection 1 mg 1 mg Intravenous Given [employer], RN     07/29/2022 1707 HYDROmorphone (DILAUDID) injection 1 mg 1 mg Intravenous Given Vicki Pittman, RN     07/29/2022 1128 HYDROmorphone (DILAUDID) injection 1 mg 1 mg Intravenous Given Lucas Blancas, FREDIS     07/30/2022 0836 digoxin (LANOXIN) injection 250 mcg 0 mcg Intravenous Hold Molly Norwood     07/29/2022 1123 digoxin (LANOXIN) injection 250 mcg 250 mcg Intravenous Given Lucas Blancas, FREDIS     07/30/2022 0835 Famotidine (PF) (PEPCID) injection 20 mg 0 mg Intravenous Hold Molly Norwood     07/29/2022 2138 Famotidine (PF) (PEPCID) injection 20 mg 20 mg Intravenous Given [employer], RN     07/29/2022 1123 Famotidine (PF) (PEPCID) injection 20 mg 20 mg Intravenous Given Lucas Blancas, RN     07/30/2022 0219 metoprolol (LOPRESSOR) injection 5 mg 5 mg Intravenous Given [employer], RN     07/29/2022 1845 metoprolol (LOPRESSOR) injection 5 mg 5 mg Intravenous Given Vicki Pittman, RN     07/29/2022 1123 metoprolol (LOPRESSOR) injection 5 mg 5 mg Intravenous Given Chidi Mcclure, FREDIS     07/29/2022 1412 fondaparinux (ARIXTRA) subcutaneous injection 10 mg 10 mg Subcutaneous Given [facility], LPN     42/78/7125 4979 promethazine (PHENERGAN) injection 25 mg   Intravenous Canceled Entry Kenan Ibarra, RN     07/30/2022 0528 promethazine (PHENERGAN) injection 25 mg 25 mg [facility]venous [facility]     07/29/2022 2138 promethazine (PHENERGAN) injection 25 mg 25 mg Intravenous Given [employer], RN     07/29/2022 1440 promethazine (PHENERGAN) injection 25 mg 25 mg Intravenous Given Chidi Mcclure, FREDIS     07/29/2022 1415 dextrose 5 % and sodium chloride 0 9 % with KCl 20 mEq/L infusion (premix) 75 mL/hr Intravenous [facility], LPN     26/35/0607 4344 dextrose 5 % and sodium chloride 0 9 % with KCl 20 mEq/L infusion (premix) 75 mL/hr Intravenous Rate/Dose Verify [facility], LPN     44/61/6683 0125 magnesium sulfate 2 g/50 mL IVPB (premix) 2 g 2 g Intravenous [facility], RN          Objective:     Vitals: Blood pressure (!) 118/47, pulse 76, temperature 98 5 °F (36 9 °C), temperature source Axillary, resp  rate 16, height 5' 11" (1 803 m), SpO2 99 %  ,Body mass index is 71 13 kg/m²        Intake/Output Summary (Last 24 hours) at 7/30/2022 4692  Last data filed at 7/29/2022 1652  Gross per 24 hour   Intake 718 75 ml   Output 350 ml   Net 368 75 ml       Physical Exam:   General Appearance: Awake and alert, in no acute distress; morbidly obese  Abdomen: Soft, non-tender, non-distended; bowel sounds normal; no masses or no organomegaly    Invasive Devices  Report    Peripheral Intravenous Line  Duration           Peripheral IV 07/27/22 Left Antecubital 3 days          Drain  Duration Open Drain Medial RUQ 15 days                Lab Results:  Admission on 07/26/2022   Component Date Value    Ventricular Rate 07/26/2022 65     Atrial Rate 07/26/2022 234     QRSD Interval 07/26/2022 76     QT Interval 07/26/2022 382     QTC Interval 07/26/2022 397     QRS Axis 07/26/2022 42     T Wave Axis 07/26/2022 266     WBC 07/26/2022 4 78     RBC 07/26/2022 2 85 (A)    Hemoglobin 07/26/2022 8 1 (A)    Hematocrit 07/26/2022 27 7 (A)    MCV 07/26/2022 97     MCH 07/26/2022 28 4     MCHC 07/26/2022 29 2 (A)    RDW 07/26/2022 17 5 (A)    MPV 07/26/2022 9 4     Platelets 78/01/4015 236     nRBC 07/26/2022 0     Neutrophils Relative 07/26/2022 54     Immat GRANS % 07/26/2022 0     Lymphocytes Relative 07/26/2022 35     Monocytes Relative 07/26/2022 10     Eosinophils Relative 07/26/2022 1     Basophils Relative 07/26/2022 0     Neutrophils Absolute 07/26/2022 2 55     Immature Grans Absolute 07/26/2022 0 01     Lymphocytes Absolute 07/26/2022 1 68     Monocytes Absolute 07/26/2022 0 49     Eosinophils Absolute 07/26/2022 0 04     Basophils Absolute 07/26/2022 0 01     Sodium 07/26/2022 135     Potassium 07/26/2022 4 0     Chloride 07/26/2022 82 (A)    CO2 07/26/2022 >45 (A)    ANION GAP 07/26/2022      BUN 07/26/2022 8     Creatinine 07/26/2022 0 68     Glucose 07/26/2022 96     Calcium 07/26/2022 7 8 (A)    Corrected Calcium 07/26/2022 9 0     AST 07/26/2022 49 (A)    ALT 07/26/2022 13     Alkaline Phosphatase 07/26/2022 107 (A)    Total Protein 07/26/2022 6 7     Albumin 07/26/2022 2 5 (A)    Total Bilirubin 07/26/2022 1 12 (A)    eGFR 07/26/2022 110     Lipase 07/26/2022 18     pH, Graeme 07/27/2022 7 378     pCO2, Graeme 07/27/2022 87 6 (A)    pO2, Graeme 07/27/2022 30 8 (A)    HCO3, Graeme 07/27/2022 50 4 (A)    Base Excess, Graeme 07/27/2022 21 9     O2 Content, Graeme 07/27/2022 7 2     O2 HGB, VENOUS 07/27/2022 55 0 (A)    Sodium 07/28/2022 134 (A)    Potassium 07/28/2022 3 1 (A)    Chloride 07/28/2022 83 (A)    CO2 07/28/2022 >45 (A)    ANION GAP 07/28/2022      BUN 07/28/2022 8     Creatinine 07/28/2022 0 59 (A)    Glucose 07/28/2022 93     Calcium 07/28/2022 8 3 (A)    eGFR 07/28/2022 117     Magnesium 07/28/2022 1 8 (A)    Sodium 07/29/2022 135     Potassium 07/29/2022 3 1 (A)    Chloride 07/29/2022 84 (A)    CO2 07/29/2022 >45 (A)    ANION GAP 07/29/2022      BUN 07/29/2022 6     Creatinine 07/29/2022 0 56 (A)    Glucose 07/29/2022 100     Glucose, Fasting 07/29/2022 100 (A)    Calcium 07/29/2022 8 1 (A)    eGFR 07/29/2022 119        Imaging Studies: I have personally reviewed pertinent imaging studies

## 2022-07-30 NOTE — PLAN OF CARE
Problem: GASTROINTESTINAL - ADULT  Goal: Minimal or absence of nausea and/or vomiting  Description: INTERVENTIONS:  - Administer IV fluids if ordered to ensure adequate hydration  - Maintain NPO status until nausea and vomiting are resolved  - Nasogastric tube if ordered  - Administer ordered antiemetic medications as needed  - Provide nonpharmacologic comfort measures as appropriate  - Advance diet as tolerated, if ordered  - Consider nutrition services referral to assist patient with adequate nutrition and appropriate food choices  Outcome: Progressing  Goal: Maintains or returns to baseline bowel function  Description: INTERVENTIONS:  - Assess bowel function  - Encourage oral fluids to ensure adequate hydration  - Administer IV fluids if ordered to ensure adequate hydration  - Administer ordered medications as needed  - Encourage mobilization and activity  - Consider nutritional services referral to assist patient with adequate nutrition and appropriate food choices  Outcome: Progressing  Goal: Maintains adequate nutritional intake  Description: INTERVENTIONS:  - Monitor percentage of each meal consumed  - Identify factors contributing to decreased intake, treat as appropriate  - Assist with meals as needed  - Monitor I&O, weight, and lab values if indicated  - Obtain nutrition services referral as needed  Outcome: Progressing  Goal: Establish and maintain optimal ostomy function  Description: INTERVENTIONS:  - Assess bowel function  - Encourage oral fluids to ensure adequate hydration  - Administer IV fluids if ordered to ensure adequate hydration   - Administer ordered medications as needed  - Encourage mobilization and activity  - Nutrition services referral to assist patient with appropriate food choices  - Assess stoma site  - Consider wound care consult   Outcome: Progressing  Goal: Oral mucous membranes remain intact  Description: INTERVENTIONS  - Assess oral mucosa and hygiene practices  - Implement preventative oral hygiene regimen  - Implement oral medicated treatments as ordered  - Initiate Nutrition services referral as needed  Outcome: Progressing     Problem: SKIN/TISSUE INTEGRITY - ADULT  Goal: Skin Integrity remains intact(Skin Breakdown Prevention)  Description: Assess:  -Perform Arnoldo assessment every   -Clean and moisturize skin every   -Inspect skin when repositioning, toileting, and assisting with ADLS  -Assess under medical devices such as  every   -Assess extremities for adequate circulation and sensation     Bed Management:  -Have minimal linens on bed & keep smooth, unwrinkled  -Change linens as needed when moist or perspiring  -Avoid sitting or lying in one position for more than  hours while in bed  -Keep HOB at degrees     Toileting:  -Offer bedside commode  -Assess for incontinence every   -Use incontinent care products after each incontinent episode such as     Activity:  -Mobilize patient  times a day  -Encourage activity and walks on unit  -Encourage or provide ROM exercises   -Turn and reposition patient every  Hours  -Use appropriate equipment to lift or move patient in bed  -Instruct/ Assist with weight shifting every  when out of bed in chair  -Consider limitation of chair time  hour intervals    Skin Care:  -Avoid use of baby powder, tape, friction and shearing, hot water or constrictive clothing  -Relieve pressure over bony prominences using   -Do not massage red bony areas    Next Steps:  -Teach patient strategies to minimize risks such as    -Consider consults to  interdisciplinary teams such a  Outcome: Progressing  Goal: Incision(s), wounds(s) or drain site(s) healing without S/S of infection  Description: INTERVENTIONS  - Assess and document dressing, incision, wound bed, drain sites and surrounding tissue  - Provide patient and family education  - Perform skin care/dressing changes every  Outcome: Progressing  Goal: Pressure injury heals and does not worsen  Description: Interventions:  - Implement low air loss mattress or specialty surface (Criteria met)  - Apply silicone foam dressing  - Instruct/assist with weight shifting every  minutes when in chair   - Limit chair time to  hour intervals  - Use special pressure reducing interventions such as  when in chair   - Apply fecal or urinary incontinence containment device   - Perform passive or active ROM every   - Turn and reposition patient & offload bony prominences every  hours   - Utilize friction reducing device or surface for transfers   - Consider consults to  interdisciplinary teams such as   - Use incontinent care products after each incontinent episode such   - Consider nutrition services referral as needed  Outcome: Progressing

## 2022-07-30 NOTE — PROGRESS NOTES
[facility]  Progress Note - Guerrero Dials 1970, 47 y o  male MRN: [id number]  Unit/Bed#: W -55 Encounter: [id number]  Primary Care Provider: Yanick Vital MD   Date and time admitted to hospital: 7/26/2022 10:55 PM    * Intractable abdominal pain  Assessment & Plan  · Presents with intractable abdominal pain and nausea with vomiting  Has been refusing to eat and nauseous and dry heaving  · No discrete evidence of acute abdominal pathology on CT scan, would continue with conservative management  · General surgery and GI following  · Continue scheduled Phenergan  Adding scheduled Reglan today  · Has essentially been NPO  Continue liquid diet  · May need to consider temporary nutrition via PICC line versus surgical intervention  Atrial fibrillation ([facility] [address] )  Assessment & Plan  · Rate controlled with Lopressor  · Continue digoxin and Arixtra  · Monitor heart rate  Chronic diastolic heart failure [facility])  Assessment & Plan  Wt Readings from Last 3 Encounters:   07/23/22 (!) 231 kg (510 lb)   07/19/22 (!) 228 kg (503 lb)   07/19/22 (!) 229 kg (503 lb 15 5 oz)     · Will hold diuretics for now given poor oral intake  · Resume when able  Chronic respiratory failure with hypoxia (HCC)  Assessment & Plan  · Chronically on 2-4 L secondary to CHF, morbid obesity and hypoventilation syndrome  · Monitor O2 status  No shortness of breath reported presently  Appearing comfortable with no respiratory distress  Hypokalemia  Assessment & Plan  · 3 1 today, secondary to poor oral intake  · Potassium containing fluids ordered  · Trend CMP and mag  History of DVT (deep vein thrombosis)  Assessment & Plan  · Continue Arixtra for now  · Pradaxa on hold as he is essentially NPO    QT prolongation  Assessment & Plan  · History of QT prolongation  · Will check baseline EKG to assess as he is on both Reglan and Phenergan      S/p small bowel obstruction  Assessment & Plan  · Had lengthy hospitalization in 2022 for SBO  · No evidence of SBO on CT scan  · Surgery evaluated, no inpatient surgical interventions besides medical management  Morbid obesity ([address] )  Assessment & Plan  · Body mass index is 71 13 kg/m²  · Will need some dietary modifications as exercise will be difficult for this patient        VTE Pharmacologic Prophylaxis: VTE Score: 4 Moderate Risk (Score 3-4) - Pharmacological DVT Prophylaxis Ordered: argatroban  Patient Centered Rounds: I performed bedside rounds with nursing staff today  Discussions with Specialists or Other Care Team Provider: CM, nursing    Education and Discussions with Family / Patient: Patient declined call to   Time Spent for Care: 30 minutes  More than 50% of total time spent on counseling and coordination of care as described above  Current Length of Stay: 1 day(s)  Current Patient Status: Inpatient   Certification Statement: The patient will continue to require additional inpatient hospital stay due to poor intake, awaiting advancement of diet  Discharge Plan: Anticipate discharge in 48-72 hrs to prior assisted or independent living facility  Code Status: Level 1 - Full Code    Subjective:   No events overnight per Nursing  Abdominal pain is about the same  Still with nausea  No vomiting  He is having urinary output and passing flatus  Objective:     Vitals:   Temp (24hrs), Av 9 °F (36 6 °C), Min:97 3 °F (36 3 °C), Max:98 5 °F (36 9 °C)    Temp:  [97 3 °F (36 3 °C)-98 5 °F (36 9 °C)] 98 5 °F (36 9 °C)  HR:  [76-86] 86  Resp:  [16-18] 16  BP: ()/(43-60) 92/46  SpO2:  [93 %-100 %] 93 %  Body mass index is 71 13 kg/m²  Input and Output Summary (last 24 hours): Intake/Output Summary (Last 24 hours) at 2022 1008  Last data filed at 2022 1652  Gross per 24 hour   Intake 718 75 ml   Output 350 ml   Net 368 75 ml       Physical Exam:   Physical Exam  Vitals and nursing note reviewed  Constitutional:       General: He is not in acute distress  Appearance: Normal appearance  He is obese  Interventions: Nasal cannula in place  Comments: + dry heaving   HENT:      Head: Normocephalic  Mouth/Throat:      Mouth: Mucous membranes are moist    Eyes:      Pupils: Pupils are equal, round, and reactive to light  Cardiovascular:      Rate and Rhythm: Normal rate and regular rhythm  Heart sounds: No murmur heard  Pulmonary:      Effort: Pulmonary effort is normal  No respiratory distress  Breath sounds: Normal breath sounds  No wheezing, rhonchi or rales  Abdominal:      General: Bowel sounds are normal  There is no distension  Palpations: Abdomen is soft  Tenderness: There is abdominal tenderness (diffuse)  There is no guarding  Hernia: No hernia is present  Comments: + EC fistula, greenish yellow drainage  No bleeding   Musculoskeletal:         General: No deformity  Cervical back: Normal range of motion  Right lower leg: Edema present  Left lower leg: Edema present  Skin:     Capillary Refill: Capillary refill takes less than 2 seconds  Neurological:      General: No focal deficit present  Mental Status: He is alert and oriented to person, place, and time  Mental status is at baseline            Additional Data:     Labs:  Results from last 7 days   Lab Units 07/30/22  0936   WBC Thousand/uL 4 55   HEMOGLOBIN g/dL 8 0*   HEMATOCRIT % 28 7*   PLATELETS Thousands/uL 203   NEUTROS PCT % 52   LYMPHS PCT % 32   MONOS PCT % 14*   EOS PCT % 2     Results from last 7 days   Lab Units 07/29/22  0933 07/28/22  0457 07/26/22  2335 07/23/22  2053   SODIUM mmol/L 135   < > 135 135   POTASSIUM mmol/L 3 1*   < > 4 0 3 7   CHLORIDE mmol/L 84*   < > 82* 91*   CO2 mmol/L >45*   < > >45* 40*   BUN mg/dL 6   < > 8 8   CREATININE mg/dL 0 56*   < > 0 68 0 65   ANION GAP mmol/L  --   --   --  4   CALCIUM mg/dL 8 1*   < > 7 8* 8 5   ALBUMIN g/dL  -- --  2 5* 2 5*   TOTAL BILIRUBIN mg/dL  --   --  1 12* 0 71   ALK PHOS U/L  --   --  107* 124*   ALT U/L  --   --  13 11   AST U/L  --   --  49* 15   GLUCOSE RANDOM mg/dL 100   < > 96 113    < > = values in this interval not displayed  Results from last 7 days   Lab Units 07/25/22  0705   HEMOGLOBIN A1C % 5 2           Lines/Drains:  Invasive Devices  Report    Peripheral Intravenous Line  Duration           Peripheral IV 07/27/22 Left Antecubital 3 days    Peripheral IV 07/30/22 Right Antecubital <1 day          Drain  Duration           Open Drain Medial RUQ 15 days                      Imaging: No pertinent imaging reviewed      Recent Cultures (last 7 days):         Last 24 Hours Medication List:   Current Facility-Administered Medications   Medication Dose Route Frequency Provider Last Rate    acetaminophen  975 mg Oral [facility] Carito Ruiz PA-C      aluminum-magnesium hydroxide-simethicone  30 mL Oral Q6H PRN Carito Ruiz PA-C      budesonide-formoterol  2 puff Inhalation BID Carito Ruiz PA-C      buPROPion  150 mg Oral Daily Carito Ruiz PA-C      dextrose 5 % and sodium chloride 0 9 % with KCl 20 mEq/L  75 mL/hr Intravenous Continuous Carito Ruiz PA-C 75 mL/hr (07/30/22 0956)    dicyclomine  20 mg Oral 4x Daily (AC & HS) Carito Ruiz PA-C      digoxin  250 mcg Intravenous Daily Carito Ruiz PA-C      docusate sodium  100 mg Oral BID Carito Ruiz PA-C      famotidine  20 mg Intravenous Q12H [facility] Carito Ruiz PA-C      fondaparinux  10 mg Subcutaneous Q24H Carito Ruiz PA-C      gabapentin  300 mg Oral TID Carito Ruiz PA-C      HYDROmorphone  1 mg Intravenous Q4H PRN Yi Ramires MD      ipratropium  0 5 mg Nebulization TID Vinny Liz MD      levalbuterol  1 25 mg Nebulization TID Vinny Liz MD      levothyroxine  25 mcg Oral Early Morning Carito Ruiz PA-C      methocarbamol  750 mg Oral Q6H Amandeep Madera PA-C  metoclopramide  10 mg Intravenous Q6H [facility] Chang Rod DO      metoprolol  5 mg Intravenous Q8H Mardee Saupe, JIMENA      ondansetron  4 mg Intravenous Q4H PRN Brayan Rodrigues MD      oxyCODONE  10 mg Oral Q4H PRN Mardee Saupe, JIMENA      oxyCODONE  5 mg Oral Q4H PRN Mardee Saupe, JIMENA      pantoprazole  40 mg Intravenous Q12H Austen Araya MD      polyethylene glycol  17 g Oral Daily Mardee Saupe, JIMENA      promethazine  25 mg Intravenous Q8H Remy Parada, JIMENA      QUEtiapine  50 mg Oral HS Mardee Saupe, JIMENA      sodium chloride  1 spray Each Nare Q1H PRN Mardee Saupe, JIMENA      sodium chloride  2 g Oral TID Mardee Saupe, JIMENA          Today, Patient Was Seen By: Melissa Esparza PA-C    **Please Note: This note may have been constructed using a voice recognition system  **

## 2022-07-30 NOTE — ASSESSMENT & PLAN NOTE
· Presents with intractable abdominal pain and nausea with vomiting  Has been refusing to eat and nauseous and dry heaving  · No discrete evidence of acute abdominal pathology on CT scan, would continue with conservative management  · General surgery and GI following  · Continue scheduled Phenergan  Adding scheduled Reglan today  · Has essentially been NPO  Continue liquid diet  · May need to consider temporary nutrition via PICC line versus surgical intervention

## 2022-07-31 LAB
BASOPHILS # BLD AUTO: 0.02 THOUSANDS/ΜL (ref 0–0.1)
BASOPHILS NFR BLD AUTO: 0 % (ref 0–1)
BUN SERPL-MCNC: 5 MG/DL (ref 5–25)
CALCIUM SERPL-MCNC: 8.4 MG/DL (ref 8.4–10.2)
CHLORIDE SERPL-SCNC: 84 MMOL/L (ref 96–108)
CO2 SERPL-SCNC: >45 MMOL/L (ref 21–32)
CREAT SERPL-MCNC: 0.54 MG/DL (ref 0.6–1.3)
EOSINOPHIL # BLD AUTO: 0.06 THOUSAND/ΜL (ref 0–0.61)
EOSINOPHIL NFR BLD AUTO: 1 % (ref 0–6)
ERYTHROCYTE [DISTWIDTH] IN BLOOD BY AUTOMATED COUNT: 17.8 % (ref 11.6–15.1)
GFR SERPL CREATININE-BSD FRML MDRD: 121 ML/MIN/1.73SQ M
GLUCOSE SERPL-MCNC: 121 MG/DL (ref 65–140)
HCT VFR BLD AUTO: 29.1 % (ref 36.5–49.3)
HGB BLD-MCNC: 8.2 G/DL (ref 12–17)
IMM GRANULOCYTES # BLD AUTO: 0.02 THOUSAND/UL (ref 0–0.2)
IMM GRANULOCYTES NFR BLD AUTO: 0 % (ref 0–2)
LYMPHOCYTES # BLD AUTO: 1.59 THOUSANDS/ΜL (ref 0.6–4.47)
LYMPHOCYTES NFR BLD AUTO: 35 % (ref 14–44)
MCH RBC QN AUTO: 28.2 PG (ref 26.8–34.3)
MCHC RBC AUTO-ENTMCNC: 28.2 G/DL (ref 31.4–37.4)
MCV RBC AUTO: 100 FL (ref 82–98)
MONOCYTES # BLD AUTO: 0.57 THOUSAND/ΜL (ref 0.17–1.22)
MONOCYTES NFR BLD AUTO: 13 % (ref 4–12)
NEUTROPHILS # BLD AUTO: 2.25 THOUSANDS/ΜL (ref 1.85–7.62)
NEUTS SEG NFR BLD AUTO: 51 % (ref 43–75)
NRBC BLD AUTO-RTO: 0 /100 WBCS
PLATELET # BLD AUTO: 201 THOUSANDS/UL (ref 149–390)
PMV BLD AUTO: 9.6 FL (ref 8.9–12.7)
POTASSIUM SERPL-SCNC: 3.2 MMOL/L (ref 3.5–5.3)
RBC # BLD AUTO: 2.91 MILLION/UL (ref 3.88–5.62)
SODIUM SERPL-SCNC: 135 MMOL/L (ref 135–147)
WBC # BLD AUTO: 4.51 THOUSAND/UL (ref 4.31–10.16)

## 2022-07-31 PROCEDURE — 94640 AIRWAY INHALATION TREATMENT: CPT

## 2022-07-31 PROCEDURE — 99232 SBSQ HOSP IP/OBS MODERATE 35: CPT | Performed by: PHYSICIAN ASSISTANT

## 2022-07-31 PROCEDURE — 99232 SBSQ HOSP IP/OBS MODERATE 35: CPT | Performed by: INTERNAL MEDICINE

## 2022-07-31 PROCEDURE — 94660 CPAP INITIATION&MGMT: CPT

## 2022-07-31 PROCEDURE — 94760 N-INVAS EAR/PLS OXIMETRY 1: CPT

## 2022-07-31 PROCEDURE — 80048 BASIC METABOLIC PNL TOTAL CA: CPT | Performed by: PHYSICIAN ASSISTANT

## 2022-07-31 PROCEDURE — 85025 COMPLETE CBC W/AUTO DIFF WBC: CPT | Performed by: PHYSICIAN ASSISTANT

## 2022-07-31 PROCEDURE — C9113 INJ PANTOPRAZOLE SODIUM, VIA: HCPCS | Performed by: INTERNAL MEDICINE

## 2022-07-31 PROCEDURE — 99232 SBSQ HOSP IP/OBS MODERATE 35: CPT | Performed by: SURGERY

## 2022-07-31 RX ORDER — DICYCLOMINE HCL 20 MG
20 TABLET ORAL 4 TIMES DAILY PRN
Status: DISCONTINUED | OUTPATIENT
Start: 2022-07-31 | End: 2022-08-03 | Stop reason: HOSPADM

## 2022-07-31 RX ORDER — PROMETHAZINE HYDROCHLORIDE 25 MG/ML
12.5 INJECTION, SOLUTION INTRAMUSCULAR; INTRAVENOUS EVERY 6 HOURS
Status: DISCONTINUED | OUTPATIENT
Start: 2022-07-31 | End: 2022-07-31

## 2022-07-31 RX ORDER — SUCRALFATE 1 G/1
1 TABLET ORAL
Status: DISCONTINUED | OUTPATIENT
Start: 2022-07-31 | End: 2022-08-03 | Stop reason: HOSPADM

## 2022-07-31 RX ADMIN — GABAPENTIN 300 MG: 250 SOLUTION ORAL at 15:49

## 2022-07-31 RX ADMIN — GABAPENTIN 300 MG: 250 SOLUTION ORAL at 09:29

## 2022-07-31 RX ADMIN — IPRATROPIUM BROMIDE 0.5 MG: 0.5 SOLUTION RESPIRATORY (INHALATION) at 20:15

## 2022-07-31 RX ADMIN — ACETAMINOPHEN 975 MG: 325 TABLET ORAL at 14:45

## 2022-07-31 RX ADMIN — DICYCLOMINE HYDROCHLORIDE 20 MG: 20 TABLET ORAL at 15:49

## 2022-07-31 RX ADMIN — SUCRALFATE 1 G: 1 TABLET ORAL at 17:35

## 2022-07-31 RX ADMIN — IPRATROPIUM BROMIDE 0.5 MG: 0.5 SOLUTION RESPIRATORY (INHALATION) at 07:57

## 2022-07-31 RX ADMIN — LEVALBUTEROL HYDROCHLORIDE 1.25 MG: 1.25 SOLUTION, CONCENTRATE RESPIRATORY (INHALATION) at 20:15

## 2022-07-31 RX ADMIN — BUDESONIDE AND FORMOTEROL FUMARATE DIHYDRATE 2 PUFF: 160; 4.5 AEROSOL RESPIRATORY (INHALATION) at 09:49

## 2022-07-31 RX ADMIN — PANTOPRAZOLE SODIUM 40 MG: 40 INJECTION, POWDER, FOR SOLUTION INTRAVENOUS at 21:45

## 2022-07-31 RX ADMIN — IPRATROPIUM BROMIDE 0.5 MG: 0.5 SOLUTION RESPIRATORY (INHALATION) at 14:48

## 2022-07-31 RX ADMIN — QUETIAPINE FUMARATE 50 MG: 25 TABLET ORAL at 21:45

## 2022-07-31 RX ADMIN — SODIUM CHLORIDE 2 G: 1 TABLET ORAL at 21:45

## 2022-07-31 RX ADMIN — METOCLOPRAMIDE HYDROCHLORIDE 10 MG: 5 INJECTION INTRAMUSCULAR; INTRAVENOUS at 12:04

## 2022-07-31 RX ADMIN — PANTOPRAZOLE SODIUM 40 MG: 40 INJECTION, POWDER, FOR SOLUTION INTRAVENOUS at 09:28

## 2022-07-31 RX ADMIN — HYDROMORPHONE HYDROCHLORIDE 1 MG: 1 INJECTION, SOLUTION INTRAMUSCULAR; INTRAVENOUS; SUBCUTANEOUS at 12:33

## 2022-07-31 RX ADMIN — DICYCLOMINE HYDROCHLORIDE 20 MG: 20 TABLET ORAL at 11:52

## 2022-07-31 RX ADMIN — METOCLOPRAMIDE HYDROCHLORIDE 10 MG: 5 INJECTION INTRAMUSCULAR; INTRAVENOUS at 15:24

## 2022-07-31 RX ADMIN — DICYCLOMINE HYDROCHLORIDE 20 MG: 20 TABLET ORAL at 06:36

## 2022-07-31 RX ADMIN — FONDAPARINUX SODIUM 10 MG: 10 INJECTION, SOLUTION SUBCUTANEOUS at 12:18

## 2022-07-31 RX ADMIN — OXYCODONE HYDROCHLORIDE 10 MG: 10 TABLET ORAL at 22:01

## 2022-07-31 RX ADMIN — GABAPENTIN 300 MG: 250 SOLUTION ORAL at 22:00

## 2022-07-31 RX ADMIN — DIGOXIN 250 MCG: 0.25 INJECTION INTRAMUSCULAR; INTRAVENOUS at 09:29

## 2022-07-31 RX ADMIN — SUCRALFATE 1 G: 1 TABLET ORAL at 21:45

## 2022-07-31 RX ADMIN — DOCUSATE SODIUM 100 MG: 100 CAPSULE, LIQUID FILLED ORAL at 17:35

## 2022-07-31 RX ADMIN — BUPROPION HYDROCHLORIDE 150 MG: 150 TABLET, FILM COATED, EXTENDED RELEASE ORAL at 09:16

## 2022-07-31 RX ADMIN — METOCLOPRAMIDE HYDROCHLORIDE 10 MG: 5 INJECTION INTRAMUSCULAR; INTRAVENOUS at 09:28

## 2022-07-31 RX ADMIN — DOCUSATE SODIUM 100 MG: 100 CAPSULE, LIQUID FILLED ORAL at 09:16

## 2022-07-31 RX ADMIN — OXYCODONE HYDROCHLORIDE 5 MG: 5 TABLET ORAL at 09:16

## 2022-07-31 RX ADMIN — FAMOTIDINE 20 MG: 10 INJECTION, SOLUTION INTRAVENOUS at 09:28

## 2022-07-31 RX ADMIN — METHOCARBAMOL 750 MG: 750 TABLET ORAL at 17:35

## 2022-07-31 RX ADMIN — ACETAMINOPHEN 975 MG: 325 TABLET ORAL at 21:45

## 2022-07-31 RX ADMIN — METHOCARBAMOL 750 MG: 750 TABLET ORAL at 12:18

## 2022-07-31 RX ADMIN — BUDESONIDE AND FORMOTEROL FUMARATE DIHYDRATE 2 PUFF: 160; 4.5 AEROSOL RESPIRATORY (INHALATION) at 17:37

## 2022-07-31 RX ADMIN — DEXTROSE, SODIUM CHLORIDE, AND POTASSIUM CHLORIDE 75 ML/HR: 5; .9; .15 INJECTION INTRAVENOUS at 21:46

## 2022-07-31 RX ADMIN — LEVALBUTEROL HYDROCHLORIDE 1.25 MG: 1.25 SOLUTION, CONCENTRATE RESPIRATORY (INHALATION) at 07:57

## 2022-07-31 RX ADMIN — LEVALBUTEROL HYDROCHLORIDE 1.25 MG: 1.25 SOLUTION, CONCENTRATE RESPIRATORY (INHALATION) at 14:48

## 2022-07-31 RX ADMIN — LEVOTHYROXINE SODIUM 25 MCG: 25 TABLET ORAL at 06:36

## 2022-07-31 RX ADMIN — PROMETHAZINE HYDROCHLORIDE 12.5 MG: 25 INJECTION INTRAMUSCULAR; INTRAVENOUS at 09:28

## 2022-07-31 NOTE — ASSESSMENT & PLAN NOTE
Wt Readings from Last 3 Encounters:   07/30/22 (!) 231 kg (510 lb)   07/23/22 (!) 231 kg (510 lb)   07/19/22 (!) 228 kg (503 lb)     · Will hold diuretics for now given poor oral intake    · Resume when able to consistently tolerate PO

## 2022-07-31 NOTE — PROGRESS NOTES
Progress Note - General Surgery   Graciela Kuhn 47 y o  male MRN: [id number]  Unit/Bed#: W -65 Encounter: [id number]    Assessment:  Patient is a 55 y  o  male w/ BMI of 71 PMHx of SBO and large ventral hernia s/p Ex lap, repair of serosal tears, bridging mesh, STSG (June-July 2022)  w/  Known EC fistula p/w decreased PO intake  No evidence of obstruction  Plan:  · Full liquids, ensures  ? Continue calorie count in order to better identify nutritional intake status  · I/Os- Please record fistula output  · Appreciate GI input  ? Dysmotility vs bezoar  ? Phenergan, Zofran, and Reglan  · Local Wound Care to STSG site and abdominal wound  Use Vaseline gauze to help with non sticking  · PT/OT  · Please [employer] on call [facility] Surgery Role with any questions       Subjective/Objective     Subjective:  No acute events overnight  Patient continue to have difficulty with tolerating a p o  Diet  Patient states he only had 2 cups of water and calorie count showed that the patient only had a boost pudding and yogurt yesterday  The calorie count was confirmed with the patient  Patient denies nausea denies vomiting denies fevers and denies chills  He says his pain is the same as yesterday  His dressings were last changed last night  EC fistula producing diarrhea looking liquid  Objective:     Blood pressure (!) 105/44, pulse 78, temperature 98 3 °F (36 8 °C), temperature source Axillary, resp  rate 18, height 5' 11" (1 803 m), weight (!) 231 kg (510 lb), SpO2 99 %  ,Body mass index is 71 13 kg/m²        Intake/Output Summary (Last 24 hours) at 7/31/2022 9371  Last data filed at 7/31/2022 0797  Gross per 24 hour   Intake 1148 75 ml   Output 825 ml   Net 323 75 ml       Invasive Devices  Report    Peripheral Intravenous Line  Duration           Peripheral IV 07/30/22 Right Antecubital <1 day          Drain  Duration           Open Drain Medial RUQ 16 days                Physical Exam  Vitals and nursing note reviewed  Constitutional:       Appearance: He is well-developed  HENT:      Head: Normocephalic and atraumatic  Eyes:      Conjunctiva/sclera: Conjunctivae normal    Cardiovascular:      Rate and Rhythm: Normal rate and regular rhythm  Heart sounds: No murmur heard  Pulmonary:      Effort: Pulmonary effort is normal  No respiratory distress  Breath sounds: Normal breath sounds  Abdominal:      General: There is distension  Tenderness: There is abdominal tenderness in the right upper quadrant, left upper quadrant and left lower quadrant  Comments: EC fistula producing diarrhea appearing liquid  Abdominal dressings appear clean and dry  Musculoskeletal:      Cervical back: Neck supple  Comments: L Leg dressing with minimal green purulent output  Skin:     General: Skin is warm and dry  Neurological:      Mental Status: He is alert              Scheduled Meds:  Current Facility-Administered Medications   Medication Dose Route Frequency Provider Last Rate    acetaminophen  975 mg Oral [facility] Carito Ruiz PA-C      aluminum-magnesium hydroxide-simethicone  30 mL Oral Q6H PRN Carito Ruiz PA-C      budesonide-formoterol  2 puff Inhalation BID Carito Ruiz PA-C      buPROPion  150 mg Oral Daily Carito Ruiz PA-C      dextrose 5 % and sodium chloride 0 9 % with KCl 20 mEq/L  75 mL/hr Intravenous Continuous Carito Ruiz PA-C 75 mL/hr (07/30/22 2043)    dicyclomine  20 mg Oral 4x Daily (AC & HS) Carito Ruiz PA-C      digoxin  250 mcg Intravenous Daily Carito Ruiz PA-C      docusate sodium  100 mg Oral BID Carito Ruiz PA-C      famotidine  20 mg Intravenous Q12H [facility] Carito Ruzi PA-C      fondaparinux  10 mg Subcutaneous Q24H Carito Ruiz PA-C      gabapentin  300 mg Oral TID Carito Ruiz PA-C      HYDROmorphone  1 mg Intravenous Q4H PRN Yi Ramires MD      ipratropium  0 5 mg Nebulization TID MD Nhan Thomas Pert levalbuterol  1 25 mg Nebulization TID Renée Horner MD      levothyroxine  25 mcg Oral Early Morning Amanda Patel PA-C      methocarbamol  750 mg Oral Q6H [address] Amanda Patel PA-C      metoclopramide  10 mg Intravenous TID AC Danielle Martinez, CRBLANCA      metoprolol  5 mg Intravenous 720 Jeffrey Lyn PA-C      ondansetron  4 mg Intravenous Q4H PRN Josey Love MD      oxyCODONE  10 mg Oral Q4H PRN Amanda Patel PA-C      oxyCODONE  5 mg Oral Q4H PRN Amanda Patel PA-C      pantoprazole  40 mg Intravenous Q12H Austen Araya MD      polyethylene glycol  17 g Oral Daily Amanda Patel PA-C      QUEtiapine  50 mg Oral HS Amanda Patel PA-C      sodium chloride  1 spray Each Nare Q1H PRN Amanda Patel PA-C      sodium chloride  2 g Oral TID Amanda Patel PA-C       Continuous Infusions:dextrose 5 % and sodium chloride 0 9 % with KCl 20 mEq/L, 75 mL/hr, Last Rate: 75 mL/hr (07/30/22 2043)      PRN Meds:   aluminum-magnesium hydroxide-simethicone    HYDROmorphone    ondansetron    oxyCODONE    oxyCODONE    sodium chloride      Lab, Imaging and other studies:  I have personally reviewed pertinent lab results    , CBC:   Lab Results   Component Value Date    WBC 4 55 07/30/2022    HGB 8 0 (L) 07/30/2022    HCT 28 7 (L) 07/30/2022     (H) 07/30/2022     07/30/2022    MCH 28 0 07/30/2022    MCHC 27 9 (L) 07/30/2022    RDW 17 9 (H) 07/30/2022    MPV 9 4 07/30/2022    NRBC 0 07/30/2022   , CMP:   Lab Results   Component Value Date    SODIUM 135 07/30/2022    K 3 4 (L) 07/30/2022    CL 85 (L) 07/30/2022    CO2 >45 (HH) 07/30/2022    BUN 6 07/30/2022    CREATININE 0 61 07/30/2022    CALCIUM 8 3 (L) 07/30/2022    AST 30 07/30/2022    ALT 12 07/30/2022    ALKPHOS 120 (H) 07/30/2022    EGFR 115 07/30/2022   , Coagulation: No results found for: PT, INR, APTT, Urinalysis: No results found for: COLORU, CLARITYU, SPECGRAV, PHUR, LEUKOCYTESUR, NITRITE, DARIN Easley BILIRUBINUR, BLOODU, Amylase: No results found for: AMYLASE, Lipase: No results found for: LIPASE  VTE Pharmacologic Prophylaxis: Fondaparinux (Arixtra) and Heparin  VTE Mechanical Prophylaxis: sequential compression device      Maribell Stratton MD  7/31/2022 6:32 AM

## 2022-07-31 NOTE — PLAN OF CARE
Problem: GASTROINTESTINAL - ADULT  Goal: Minimal or absence of nausea and/or vomiting  Description: INTERVENTIONS:  - Administer IV fluids if ordered to ensure adequate hydration  - Maintain NPO status until nausea and vomiting are resolved  - Nasogastric tube if ordered  - Administer ordered antiemetic medications as needed  - Provide nonpharmacologic comfort measures as appropriate  - Advance diet as tolerated, if ordered  - Consider nutrition services referral to assist patient with adequate nutrition and appropriate food choices  Outcome: Progressing  Goal: Maintains or returns to baseline bowel function  Description: INTERVENTIONS:  - Assess bowel function  - Encourage oral fluids to ensure adequate hydration  - Administer IV fluids if ordered to ensure adequate hydration  - Administer ordered medications as needed  - Encourage mobilization and activity  - Consider nutritional services referral to assist patient with adequate nutrition and appropriate food choices  Outcome: Progressing  Goal: Maintains adequate nutritional intake  Description: INTERVENTIONS:  - Monitor percentage of each meal consumed  - Identify factors contributing to decreased intake, treat as appropriate  - Assist with meals as needed  - Monitor I&O, weight, and lab values if indicated  - Obtain nutrition services referral as needed  Outcome: Progressing  Goal: Establish and maintain optimal ostomy function  Description: INTERVENTIONS:  - Assess bowel function  - Encourage oral fluids to ensure adequate hydration  - Administer IV fluids if ordered to ensure adequate hydration   - Administer ordered medications as needed  - Encourage mobilization and activity  - Nutrition services referral to assist patient with appropriate food choices  - Assess stoma site  - Consider wound care consult   Outcome: Progressing  Goal: Oral mucous membranes remain intact  Description: INTERVENTIONS  - Assess oral mucosa and hygiene practices  - Implement preventative oral hygiene regimen  - Implement oral medicated treatments as ordered  - Initiate Nutrition services referral as needed  Outcome: Progressing

## 2022-07-31 NOTE — PROGRESS NOTES
Progress Note- Avinash Dietrich 47 y o  male MRN: [id number]    Unit/Bed#: W -01 Encounter: [id number]      Assessment and Plan:    1  Epigastric discomfort with nausea and reported poor tolerance of oral intake; suspected gastroparesis, patient was noted with food bezoar on EGD earlier this year   He is post cholecystectomy as well as gastric sleeve, no evidence of biliary obstruction at this time, no evidence of bowel obstruction clinically or on imaging        - Continue Reglan 10 mg TID  prior to meals  Continue Zofran as needed for breakthrough nausea    -Bentyl changed to PRN as anticholinergics can slow gastric emptying   -Minimize narcotic use      -Continue Protonix twice daily, Carafate added     -Encourage nutritional intake, patient tolerating full liquids/toe so diet advanced to low-fiber/low residue, low-fat  -Calorie counts in process    -According to chart, weight has been stable for the past 3 months-5 await lb on 4/9 and 510 lb on 7/23 and he is not interested in feeding tube which wouldn't be able to be placed endoscopically in any case due to BMI  He is high risk for EGD due to body habitus       ______________________________________________________________________    Subjective:     Patient reports he tolerated breakfast this morning however on his 4th piece of toast he became nauseated  Denies any recent vomiting  Appears comfortable lying in bed      Medication Administration - last 24 hours from 07/30/2022 1710 to 07/31/2022 1710       Date/Time Order Dose Route Action Action by     07/31/2022 1549 sodium chloride tablet 2 g 2 g Oral Refused Aaron Stratton LPN     63/16/4365 4200 sodium chloride tablet 2 g 2 g Oral Refused Aaron Stratton LPN     58/77/9289 1241 sodium chloride [facility] 2 g 2 [facility]     07/30/2022 1737 sodium chloride tablet 2 g 2 g Oral Refused Molly Norwood     07/30/2022 2127 QUEtiapine (SEROquel) tablet 50 mg 50 mg Oral Given Richrd Pilling     07/31/2022 8791 polyethylene glycol (MIRALAX) packet 17 g 17 g Oral Refused Belle Alaina LPN     90/10/0882 2992 methocarbamol (ROBAXIN) tablet 750 mg 750 mg Oral Given [facility] [place], [place]     16/97/4090 9156 methocarbamol (ROBAXIN) tablet 750 mg 750 mg Oral Refused Nobie Vela     07/30/2022 2301 methocarbamol (ROBAXIN) tablet 750 mg 750 mg Oral Refused Nobie Vela     07/30/2022 1738 methocarbamol (ROBAXIN) tablet 750 mg 750 mg Oral Refused [facility]     07/31/2022 0636 levothyroxine tablet 25 mcg 25 mcg Oral Given Nobie Vela     07/31/2022 1549 gabapentin (NEURONTIN) oral solution 300 mg 300 mg Oral Given Belle Alaina LPANTWON     14/04/7822 5656 gabapentin (NEURONTIN) oral solution 300 mg 300 mg Oral Given Vicki Pittman RN     07/30/2022 2049 gabapentin (NEURONTIN) oral solution 300 mg 300 mg Oral Given Nobie Vela     07/30/2022 1736 gabapentin (NEURONTIN) oral solution 300 mg 300 mg Oral Given [facility]     07/31/2022 0916 docusate sodium (COLACE) capsule 100 mg 100 mg Oral Given Belle Alaina LPN     41/85/9695 4533 docusate sodium (COLACE) capsule 100 mg 100 mg Oral Given [facility]     07/31/2022 1549 dicyclomine (BENTYL) tablet 20 mg 20 mg Oral Given Belle Alaina LPN     76/59/1345 3247 dicyclomine (BENTYL) tablet 20 mg 20 mg [facility]     58/95/3340 3889 dicyclomine (BENTYL) tablet 20 mg 20 mg Oral Given Nobie Vela     07/30/2022 2127 dicyclomine (BENTYL) tablet 20 mg 20 mg Oral Given Nobie Vela     07/30/2022 1737 dicyclomine (BENTYL) tablet 20 mg 20 mg Oral Given [facility]     07/31/2022 0949 budesonide-formoterol (SYMBICORT) 160-4 5 mcg/act inhaler 2 puff 2 puff Inhalation Given Belle Foley LPN     90/32/5595 3889 budesonide-formoterol (SYMBICORT) 160-4 5 mcg/act inhaler 2 puff 2 puff Inhalation Given Joanie Norwood     07/31/2022 1445 acetaminophen (TYLENOL) tablet 975 mg 975 mg Oral Given Belle Foley LPN     30/96/1786 4310 acetaminophen (TYLENOL) tablet 975 mg 975 mg Oral Refused Mathew Vela     07/30/2022 9278 acetaminophen (TYLENOL) tablet 975 mg 975 mg Oral Refused [facility] Pilling     07/31/2022 0928 pantoprazole (PROTONIX) injection 40 mg 40 mg Intravenous Given Vicki Pittman RN     07/31/2022 0022 pantoprazole (PROTONIX) injection 40 mg 0 mg Intravenous Hold [facility] Pilling     07/31/2022 1448 levalbuterol (Jailyn Benedicto) inhalation solution 1 25 mg 1 25 mg Nebulization Given Tabatha Aleman, RT     07/31/2022 0757 levalbuterol (XOPENEX) inhalation solution 1 25 mg 1 25 mg Nebulization Given Tabatha Aleman, RT     07/30/2022 2027 levalbuterol (XOPENEX) inhalation solution 1 25 mg 1 25 mg Nebulization [facility]     07/31/2022 1448 ipratropium (ATROVENT) 0 02 % inhalation solution 0 5 mg 0 5 mg Nebulization Given Tabatha Aleman, RT     07/31/2022 0757 ipratropium (ATROVENT) 0 02 % inhalation solution 0 5 mg 0 5 mg Nebulization Given Tabatha Aleman, RT     07/30/2022 2027 ipratropium (ATROVENT) 0 02 % inhalation solution 0 5 mg 0 5 mg Nebulization Given Adilia Laureen, RT     07/31/2022 0916 oxyCODONE (ROXICODONE) IR tablet 5 mg 5 mg Oral Given Aaron Stratton, LPN     30/15/6101 5040 oxyCODONE (ROXICODONE) IR tablet 5 mg 5 mg Oral Given Domenic Alcantara     07/31/2022 0916 buPROPion (WELLBUTRIN XL) 24 hr tablet 150 mg 150 mg Oral Given Aaron Stratton, LPN     80/34/1128 9581 HYDROmorphone (DILAUDID) injection 1 mg 1 mg Intravenous Given Kaelyn Mcqueen RN     07/31/2022 0929 digoxin (LANOXIN) injection 250 mcg 250 mcg Intravenous Given Vicki Pittman RN     07/31/2022 0928 Famotidine (PF) (PEPCID) injection 20 mg 20 mg Intravenous Given Vicki Pittman RN     07/31/2022 0020 Famotidine (PF) (PEPCID) injection 20 mg 0 mg Intravenous Hold [facility] Pilling     07/31/2022 0928 metoprolol (LOPRESSOR) injection 5 mg 5 mg Intravenous Not Given Vicki Pittman RN     07/31/2022 0231 metoprolol (LOPRESSOR) injection 5 mg 5 mg Intravenous Not Given Edna Pilling     07/30/2022 1741 metoprolol (LOPRESSOR) injection 5 mg 5 mg Intravenous Not Given Domenic Alcantara 07/31/2022 1218 fondaparinux (ARIXTRA) subcutaneous injection 10 mg 10 mg Subcutaneous Given Nimco Gagnon, LPN     82/52/9690 9640 dextrose 5 % and sodium chloride 0 9 % with KCl 20 mEq/L infusion (premix) 75 mL/hr Intravenous Restarted Nimco Gagnon, LPN     03/27/6710 0224 dextrose 5 % and sodium chloride 0 9 % with KCl 20 mEq/L infusion (premix) 0 mL/hr Intravenous Stopped Nimco Gagnon, LPN     33/80/2016 5119 dextrose 5 % and sodium chloride 0 9 % with KCl 20 mEq/L infusion (premix) 75 mL/hr Intravenous Restarted Cozette Sondra     07/30/2022 2043 magnesium sulfate 2 g/50 mL IVPB (premix) 2 g 0 g Intravenous Stopped Cozette Sondra     07/31/2022 1524 metoclopramide (REGLAN) injection 10 mg 10 mg Intravenous Given Vicki Pittman RN     07/31/2022 1204 metoclopramide (REGLAN) injection 10 mg 10 mg Intravenous Given Patrice Sharma RN     07/31/2022 0928 metoclopramide (REGLAN) injection 10 mg 10 mg Intravenous Given Vicki Pittman RN     07/30/2022 2129 metoclopramide (REGLAN) injection 10 mg 10 mg Intravenous Refused Luz Amaro     07/31/2022 0928 promethazine (PHENERGAN) injection 12 5 mg 12 5 mg Intravenous Given Vicki Pittman RN          Objective:     Vitals: Blood pressure (!) 117/48, pulse 83, temperature (!) 97 2 °F (36 2 °C), temperature source Oral, resp  rate 18, height 5' 11" (1 803 m), weight (!) 231 kg (510 lb), SpO2 98 %  ,Body mass index is 71 13 kg/m²        Intake/Output Summary (Last 24 hours) at 7/31/2022 1710  Last data filed at 7/31/2022 1257  Gross per 24 hour   Intake 1060 ml   Output 1275 ml   Net -215 ml       Physical Exam:   General Appearance: Awake and alert, in no acute distress  Abdomen: Soft, non-tender, non-distended; bowel sounds normal; no masses or no organomegaly    Invasive Devices  Report    Peripheral Intravenous Line  Duration           Peripheral IV 07/31/22 Right;Ventral (anterior) Forearm <1 day          Drain  Duration           Open Drain Medial RUQ 16 days                Lab Results:  Admission on 07/26/2022   Component Date Value    Ventricular Rate 07/26/2022 65     Atrial Rate 07/26/2022 234     QRSD Interval 07/26/2022 76     QT Interval 07/26/2022 382     QTC Interval 07/26/2022 397     QRS Axis 07/26/2022 42     T Wave Axis 07/26/2022 266     WBC 07/26/2022 4 78     RBC 07/26/2022 2 85 (A)    Hemoglobin 07/26/2022 8 1 (A)    Hematocrit 07/26/2022 27 7 (A)    MCV 07/26/2022 97     MCH 07/26/2022 28 4     MCHC 07/26/2022 29 2 (A)    RDW 07/26/2022 17 5 (A)    MPV 07/26/2022 9 4     Platelets 38/02/8407 236     nRBC 07/26/2022 0     Neutrophils Relative 07/26/2022 54     Immat GRANS % 07/26/2022 0     Lymphocytes Relative 07/26/2022 35     Monocytes Relative 07/26/2022 10     Eosinophils Relative 07/26/2022 1     Basophils Relative 07/26/2022 0     Neutrophils Absolute 07/26/2022 2 55     Immature Grans Absolute 07/26/2022 0 01     Lymphocytes Absolute 07/26/2022 1 68     Monocytes Absolute 07/26/2022 0 49     Eosinophils Absolute 07/26/2022 0 04     Basophils Absolute 07/26/2022 0 01     Sodium 07/26/2022 135     Potassium 07/26/2022 4 0     Chloride 07/26/2022 82 (A)    CO2 07/26/2022 >45 (A)    ANION GAP 07/26/2022      BUN 07/26/2022 8     Creatinine 07/26/2022 0 68     Glucose 07/26/2022 96     Calcium 07/26/2022 7 8 (A)    Corrected Calcium 07/26/2022 9 0     AST 07/26/2022 49 (A)    ALT 07/26/2022 13     Alkaline Phosphatase 07/26/2022 107 (A)    Total Protein 07/26/2022 6 7     Albumin 07/26/2022 2 5 (A)    Total Bilirubin 07/26/2022 1 12 (A)    eGFR 07/26/2022 110     Lipase 07/26/2022 18     pH, Graeme 07/27/2022 7 378     pCO2, Graeme 07/27/2022 87 6 (A)    pO2, Graeme 07/27/2022 30 8 (A)    HCO3, Graeme 07/27/2022 50 4 (A)    Base Excess, Graeme 07/27/2022 21 9     O2 Content, Graeme 07/27/2022 7 2     O2 HGB, VENOUS 07/27/2022 55 0 (A)    Sodium 07/28/2022 134 (A)    Potassium 07/28/2022 3 1 (A)    Chloride 07/28/2022 83 (A)    CO2 07/28/2022 >45 (A)    ANION GAP 07/28/2022      BUN 07/28/2022 8     Creatinine 07/28/2022 0 59 (A)    Glucose 07/28/2022 93     Calcium 07/28/2022 8 3 (A)    eGFR 07/28/2022 117     Magnesium 07/28/2022 1 8 (A)    Sodium 07/29/2022 135     Potassium 07/29/2022 3 1 (A)    Chloride 07/29/2022 84 (A)    CO2 07/29/2022 >45 (A)    ANION GAP 07/29/2022      BUN 07/29/2022 6     Creatinine 07/29/2022 0 56 (A)    Glucose 07/29/2022 100     Glucose, Fasting 07/29/2022 100 (A)    Calcium 07/29/2022 8 1 (A)    eGFR 07/29/2022 119     WBC 07/30/2022 4 55     RBC 07/30/2022 2 86 (A)    Hemoglobin 07/30/2022 8 0 (A)    Hematocrit 07/30/2022 28 7 (A)    MCV 07/30/2022 100 (A)    MCH 07/30/2022 28 0     MCHC 07/30/2022 27 9 (A)    RDW 07/30/2022 17 9 (A)    MPV 07/30/2022 9 4     Platelets 50/61/7039 203     nRBC 07/30/2022 0     Neutrophils Relative 07/30/2022 52     Immat GRANS % 07/30/2022 0     Lymphocytes Relative 07/30/2022 32     Monocytes Relative 07/30/2022 14 (A)    Eosinophils Relative 07/30/2022 2     Basophils Relative 07/30/2022 0     Neutrophils Absolute 07/30/2022 2 37     Immature Grans Absolute 07/30/2022 0 01     Lymphocytes Absolute 07/30/2022 1 46     Monocytes Absolute 07/30/2022 0 62     Eosinophils Absolute 07/30/2022 0 07     Basophils Absolute 07/30/2022 0 02     Sodium 07/30/2022 135     Potassium 07/30/2022 3 4 (A)    Chloride 07/30/2022 85 (A)    CO2 07/30/2022 >45 (A)    ANION GAP 07/30/2022      BUN 07/30/2022 6     Creatinine 07/30/2022 0 61     Glucose 07/30/2022 132     Calcium 07/30/2022 8 3 (A)    Corrected Calcium 07/30/2022 9 6     AST 07/30/2022 30     ALT 07/30/2022 12     Alkaline Phosphatase 07/30/2022 120 (A)    Total Protein 07/30/2022 6 4     Albumin 07/30/2022 2 4 (A)    Total Bilirubin 07/30/2022 1 35 (A)    eGFR 07/30/2022 115     Magnesium 07/30/2022 2 1     Phosphorus 07/30/2022 2 7     Sodium 07/31/2022 135     Potassium 07/31/2022 3 2 (A)    Chloride 07/31/2022 84 (A)    CO2 07/31/2022 >45 (A)    ANION GAP 07/31/2022      BUN 07/31/2022 5     Creatinine 07/31/2022 0 54 (A)    Glucose 07/31/2022 121     Calcium 07/31/2022 8 4     eGFR 07/31/2022 121     WBC 07/31/2022 4 51     RBC 07/31/2022 2 91 (A)    Hemoglobin 07/31/2022 8 2 (A)    Hematocrit 07/31/2022 29 1 (A)    MCV 07/31/2022 100 (A)    MCH 07/31/2022 28 2     MCHC 07/31/2022 28 2 (A)    RDW 07/31/2022 17 8 (A)    MPV 07/31/2022 9 6     Platelets 72/88/2991 201     nRBC 07/31/2022 0     Neutrophils Relative 07/31/2022 51     Immat GRANS % 07/31/2022 0     Lymphocytes Relative 07/31/2022 35     Monocytes Relative 07/31/2022 13 (A)    Eosinophils Relative 07/31/2022 1     Basophils Relative 07/31/2022 0     Neutrophils Absolute 07/31/2022 2 25     Immature Grans Absolute 07/31/2022 0 02     Lymphocytes Absolute 07/31/2022 1 59     Monocytes Absolute 07/31/2022 0 57     Eosinophils Absolute 07/31/2022 0 06     Basophils Absolute 07/31/2022 0 02        Imaging Studies: I have personally reviewed pertinent imaging studies

## 2022-07-31 NOTE — ASSESSMENT & PLAN NOTE
· Had lengthy hospitalization in June of 2022 for SBO  · No evidence of SBO on CT scan  · Surgery evaluated, no inpatient surgical interventions anticipated besides medical management

## 2022-07-31 NOTE — ASSESSMENT & PLAN NOTE
· Presents with intractable abdominal pain and nausea with vomiting  Has been refusing to eat and nauseous and dry heaving  · No discrete evidence of acute abdominal pathology on CT scan, would continue with conservative management  · General surgery and GI following  · Continue scheduled Phenergan and Reglan today  · Continue surgical diet, note he did keep down some toast this AM  Seems to be slowly improving

## 2022-07-31 NOTE — PROGRESS NOTES
[facility]  Progress Note - Graciela Kuhn 1970, 47 y o  male MRN: [id number]  Unit/Bed#: W -11 Encounter: [id number]  Primary Care Provider: Rina Cuadra MD   Date and time admitted to hospital: 7/26/2022 10:55 PM    * Intractable abdominal pain  Assessment & Plan  · Presents with intractable abdominal pain and nausea with vomiting  Has been refusing to eat and nauseous and dry heaving  · No discrete evidence of acute abdominal pathology on CT scan, would continue with conservative management  · General surgery and GI following  · Continue scheduled Phenergan and Reglan today  · Continue surgical diet, note he did keep down some toast this AM  Seems to be slowly improving  Atrial fibrillation ([address] )  Assessment & Plan  · Rate controlled with Lopressor  · Continue digoxin and Arixtra  · Monitor heart rate  Chronic diastolic heart failure [facility])  Assessment & Plan  Wt Readings from Last 3 Encounters:   07/30/22 (!) 231 kg (510 lb)   07/23/22 (!) 231 kg (510 lb)   07/19/22 (!) 228 kg (503 lb)     · Will hold diuretics for now given poor oral intake  · Resume when able to consistently tolerate PO    Chronic respiratory failure with hypoxia (HCC)  Assessment & Plan  · Chronically on 2-4 L secondary to CHF, morbid obesity and hypoventilation syndrome  · Monitor O2 status  No shortness of breath reported presently  Appearing comfortable with no respiratory distress  Hypokalemia  Assessment & Plan  · 3 2 today, secondary to poor oral intake  · Potassium containing fluids ordered  · Trend CMP  History of DVT (deep vein thrombosis)  Assessment & Plan  · Continue Arixtra for now  · Pradaxa on hold as he is not reliable keeping down pills yet  QT prolongation  Assessment & Plan  · History of QT prolongation noted  · He is on both Reglan and Phenergan    · QTc 389    S/p small bowel obstruction  Assessment & Plan  · Had lengthy hospitalization in June of 2022 for SBO   · No evidence of SBO on CT scan  · Surgery evaluated, no inpatient surgical interventions anticipated besides medical management  Morbid obesity ([facility] [address] )  Assessment & Plan  · Body mass index is 71 13 kg/m²  · Will need some dietary modifications as exercise will be difficult for this patient        VTE Pharmacologic Prophylaxis: VTE Score: 4 Moderate Risk (Score 3-4) - Pharmacological DVT Prophylaxis Ordered: other medication Arixtra  Patient Centered Rounds: I performed bedside rounds with nursing staff today  Discussions with Specialists or Other Care Team Provider: nursing    Education and Discussions with Family / Patient: Patient declined call to   Time Spent for Care: 30 minutes  More than 50% of total time spent on counseling and coordination of care as described above  Current Length of Stay: 2 day(s)  Current Patient Status: Inpatient   Certification Statement: The patient will continue to require additional inpatient hospital stay due to poor intake, intractible abd pain  Discharge Plan: Anticipate discharge in 24-48 hrs to prior assisted or independent living facility  Code Status: Level 1 - Full Code    Subjective:   No overnight events per nursing  Reports that he ate a whole piece of toast and kept down this morning  Overall feeling improved  Objective:     Vitals:   Temp (24hrs), Av 7 °F (36 5 °C), Min:97 4 °F (36 3 °C), Max:98 3 °F (36 8 °C)    Temp:  [97 4 °F (36 3 °C)-98 3 °F (36 8 °C)] 97 4 °F (36 3 °C)  HR:  [75-82] 75  Resp:  [18] 18  BP: (105-121)/(44-86) 121/86  SpO2:  [95 %-100 %] 95 %  Body mass index is 71 13 kg/m²  Input and Output Summary (last 24 hours): Intake/Output Summary (Last 24 hours) at 2022 1237  Last data filed at 2022 1001  Gross per 24 hour   Intake 1428 75 ml   Output 1275 ml   Net 153 75 ml       Physical Exam:   Physical Exam  Vitals and nursing note reviewed     Constitutional:       General: He is not in acute distress  Appearance: Normal appearance  He is obese  Interventions: Nasal cannula in place  HENT:      Head: Normocephalic  Mouth/Throat:      Mouth: Mucous membranes are moist    Eyes:      Pupils: Pupils are equal, round, and reactive to light  Cardiovascular:      Rate and Rhythm: Normal rate and regular rhythm  Heart sounds: No murmur heard  Pulmonary:      Effort: Pulmonary effort is normal  No respiratory distress  Breath sounds: Normal breath sounds  No wheezing, rhonchi or rales  Abdominal:      General: Bowel sounds are normal  There is no distension  Palpations: Abdomen is soft  Tenderness: There is abdominal tenderness (diffuse)  There is no guarding  Hernia: No hernia is present  Comments: + EC fistula, greenish yellow drainage  No bleeding   Musculoskeletal:         General: No deformity  Cervical back: Normal range of motion  Right lower leg: Edema present  Left lower leg: Edema present  Skin:     Capillary Refill: Capillary refill takes less than 2 seconds  Neurological:      General: No focal deficit present  Mental Status: He is alert and oriented to person, place, and time  Mental status is at baseline           Additional Data:     Labs:  Results from last 7 days   Lab Units 07/31/22  0843   WBC Thousand/uL 4 51   HEMOGLOBIN g/dL 8 2*   HEMATOCRIT % 29 1*   PLATELETS Thousands/uL 201   NEUTROS PCT % 51   LYMPHS PCT % 35   MONOS PCT % 13*   EOS PCT % 1     Results from last 7 days   Lab Units 07/31/22  0843 07/30/22  0936   SODIUM mmol/L 135 135   POTASSIUM mmol/L 3 2* 3 4*   CHLORIDE mmol/L 84* 85*   CO2 mmol/L >45* >45*   BUN mg/dL 5 6   CREATININE mg/dL 0 54* 0 61   CALCIUM mg/dL 8 4 8 3*   ALBUMIN g/dL  --  2 4*   TOTAL BILIRUBIN mg/dL  --  1 35*   ALK PHOS U/L  --  120*   ALT U/L  --  12   AST U/L  --  30   GLUCOSE RANDOM mg/dL 121 132             Results from last 7 days   Lab Units 07/25/22  0705   HEMOGLOBIN A1C % 5 2           Lines/Drains:  Invasive Devices  Report    Peripheral Intravenous Line  Duration           Peripheral IV 07/31/22 Right;Ventral (anterior) Forearm <1 day          Drain  Duration           Open Drain Medial RUQ 16 days                      Imaging: No pertinent imaging reviewed      Recent Cultures (last 7 days):         Last 24 Hours Medication List:   Current Facility-Administered Medications   Medication Dose Route Frequency Provider Last Rate    acetaminophen  975 mg Oral [facility] Avani Ahn PA-C      aluminum-magnesium hydroxide-simethicone  30 mL Oral Q6H PRN Avani Ahn PA-C      budesonide-formoterol  2 puff Inhalation BID Avani Ahn PA-C      buPROPion  150 mg Oral Daily Avani Ahn PA-C      dextrose 5 % and sodium chloride 0 9 % with KCl 20 mEq/L  75 mL/hr Intravenous Continuous Avani Ahn PA-C 75 mL/hr (07/31/22 0845)    dicyclomine  20 mg Oral 4x Daily (AC & HS) Avani Ahn PA-C      digoxin  250 mcg Intravenous Daily Avani Ahn PA-C      docusate sodium  100 mg Oral BID Avani Ahn PA-C      famotidine  20 mg Intravenous Q12H [facility] Avani Ahn PA-C      fondaparinux  10 mg Subcutaneous Q24H Avani Ahn PA-C      gabapentin  300 mg Oral TID Avani Ahn PA-C      HYDROmorphone  1 mg Intravenous Q4H PRN Carleen Perez MD      ipratropium  0 5 mg Nebulization TID Eleanor Black MD      levalbuterol  1 25 mg Nebulization TID Eleanor Black MD      levothyroxine  25 mcg Oral Early Morning Avani Ahn PA-C      methocarbamol  750 mg Oral Q6H [facility] Avani Ahn PA-C      metoclopramide  10 mg Intravenous TID RADHA Waite      metoprolol  5 mg Intravenous Q8H Betina Jones PA-C      ondansetron  4 mg Intravenous Q4H PRN Rosalba Hammans, MD      oxyCODONE  10 mg Oral Q4H PRN Avani Ahn PA-C      oxyCODONE  5 mg Oral Q4H PRN Avani Ahn PA-C      pantoprazole  40 mg Intravenous Q12H [address] Carlos Alberts MD      polyethylene glycol  17 g Oral Daily Jarret JIMENA Means      QUEtiapine  50 mg Oral HS Jarret JIMENA Means      sodium chloride  1 spray Each Nare Q1H PRN Jarret JIMENA Means      sodium chloride  2 g Oral TID Jarret JIMENA Means          Today, Patient Was Seen By: Maty Kahn PA-C    **Please Note: This note may have been constructed using a voice recognition system  **

## 2022-08-01 PROBLEM — E87.6 HYPOKALEMIA: Status: RESOLVED | Noted: 2021-11-11 | Resolved: 2022-08-01

## 2022-08-01 LAB
ALBUMIN SERPL BCP-MCNC: 2.3 G/DL (ref 3.5–5)
ALP SERPL-CCNC: 115 U/L (ref 34–104)
ALT SERPL W P-5'-P-CCNC: 12 U/L (ref 7–52)
ANION GAP SERPL CALCULATED.3IONS-SCNC: 4 MMOL/L (ref 4–13)
AST SERPL W P-5'-P-CCNC: 42 U/L (ref 13–39)
ATRIAL RATE: 72 BPM
BILIRUB SERPL-MCNC: 0.82 MG/DL (ref 0.2–1)
BUN SERPL-MCNC: 5 MG/DL (ref 5–25)
CALCIUM ALBUM COR SERPL-MCNC: 9.7 MG/DL (ref 8.3–10.1)
CALCIUM SERPL-MCNC: 8.3 MG/DL (ref 8.4–10.2)
CHLORIDE SERPL-SCNC: 90 MMOL/L (ref 96–108)
CO2 SERPL-SCNC: 41 MMOL/L (ref 21–32)
CREAT SERPL-MCNC: 0.47 MG/DL (ref 0.6–1.3)
GFR SERPL CREATININE-BSD FRML MDRD: 128 ML/MIN/1.73SQ M
GLUCOSE SERPL-MCNC: 87 MG/DL (ref 65–140)
POTASSIUM SERPL-SCNC: 3.5 MMOL/L (ref 3.5–5.3)
PROT SERPL-MCNC: 6.2 G/DL (ref 6.4–8.4)
QRS AXIS: 17 DEGREES
QRSD INTERVAL: 106 MS
QT INTERVAL: 358 MS
QTC INTERVAL: 389 MS
SODIUM SERPL-SCNC: 135 MMOL/L (ref 135–147)
T WAVE AXIS: 206 DEGREES
VENTRICULAR RATE: 71 BPM

## 2022-08-01 PROCEDURE — C9113 INJ PANTOPRAZOLE SODIUM, VIA: HCPCS | Performed by: INTERNAL MEDICINE

## 2022-08-01 PROCEDURE — 99024 POSTOP FOLLOW-UP VISIT: CPT | Performed by: SURGERY

## 2022-08-01 PROCEDURE — 99232 SBSQ HOSP IP/OBS MODERATE 35: CPT | Performed by: INTERNAL MEDICINE

## 2022-08-01 PROCEDURE — 94660 CPAP INITIATION&MGMT: CPT

## 2022-08-01 PROCEDURE — 94003 VENT MGMT INPAT SUBQ DAY: CPT

## 2022-08-01 PROCEDURE — 93010 ELECTROCARDIOGRAM REPORT: CPT | Performed by: INTERNAL MEDICINE

## 2022-08-01 PROCEDURE — 80053 COMPREHEN METABOLIC PANEL: CPT | Performed by: PHYSICIAN ASSISTANT

## 2022-08-01 PROCEDURE — 94760 N-INVAS EAR/PLS OXIMETRY 1: CPT

## 2022-08-01 PROCEDURE — 99232 SBSQ HOSP IP/OBS MODERATE 35: CPT | Performed by: PHYSICIAN ASSISTANT

## 2022-08-01 PROCEDURE — 94640 AIRWAY INHALATION TREATMENT: CPT

## 2022-08-01 RX ORDER — DABIGATRAN ETEXILATE 150 MG/1
150 CAPSULE ORAL EVERY 12 HOURS SCHEDULED
Status: DISCONTINUED | OUTPATIENT
Start: 2022-08-01 | End: 2022-08-03 | Stop reason: HOSPADM

## 2022-08-01 RX ORDER — LEVALBUTEROL 1.25 MG/.5ML
1.25 SOLUTION, CONCENTRATE RESPIRATORY (INHALATION) EVERY 6 HOURS PRN
Status: DISCONTINUED | OUTPATIENT
Start: 2022-08-01 | End: 2022-08-03 | Stop reason: HOSPADM

## 2022-08-01 RX ORDER — GABAPENTIN 300 MG/1
300 CAPSULE ORAL 3 TIMES DAILY
Status: DISCONTINUED | OUTPATIENT
Start: 2022-08-01 | End: 2022-08-03 | Stop reason: HOSPADM

## 2022-08-01 RX ADMIN — DIGOXIN 250 MCG: 0.25 INJECTION INTRAMUSCULAR; INTRAVENOUS at 10:00

## 2022-08-01 RX ADMIN — ACETAMINOPHEN 975 MG: 325 TABLET ORAL at 06:05

## 2022-08-01 RX ADMIN — METHOCARBAMOL 750 MG: 750 TABLET ORAL at 19:36

## 2022-08-01 RX ADMIN — METHOCARBAMOL 750 MG: 750 TABLET ORAL at 06:05

## 2022-08-01 RX ADMIN — METOCLOPRAMIDE HYDROCHLORIDE 10 MG: 5 INJECTION INTRAMUSCULAR; INTRAVENOUS at 19:00

## 2022-08-01 RX ADMIN — METOPROLOL TARTRATE 25 MG: 25 TABLET, FILM COATED ORAL at 10:00

## 2022-08-01 RX ADMIN — SODIUM CHLORIDE 2 G: 1 TABLET ORAL at 18:49

## 2022-08-01 RX ADMIN — GABAPENTIN 300 MG: 300 CAPSULE ORAL at 10:00

## 2022-08-01 RX ADMIN — SUCRALFATE 1 G: 1 TABLET ORAL at 06:05

## 2022-08-01 RX ADMIN — METOCLOPRAMIDE HYDROCHLORIDE 10 MG: 5 INJECTION INTRAMUSCULAR; INTRAVENOUS at 12:27

## 2022-08-01 RX ADMIN — QUETIAPINE FUMARATE 50 MG: 25 TABLET ORAL at 22:10

## 2022-08-01 RX ADMIN — PANTOPRAZOLE SODIUM 40 MG: 40 INJECTION, POWDER, FOR SOLUTION INTRAVENOUS at 10:00

## 2022-08-01 RX ADMIN — LEVOTHYROXINE SODIUM 25 MCG: 25 TABLET ORAL at 06:05

## 2022-08-01 RX ADMIN — SUCRALFATE 1 G: 1 TABLET ORAL at 18:49

## 2022-08-01 RX ADMIN — DOCUSATE SODIUM 100 MG: 100 CAPSULE, LIQUID FILLED ORAL at 10:00

## 2022-08-01 RX ADMIN — BUDESONIDE AND FORMOTEROL FUMARATE DIHYDRATE 2 PUFF: 160; 4.5 AEROSOL RESPIRATORY (INHALATION) at 10:00

## 2022-08-01 RX ADMIN — DEXTROSE, SODIUM CHLORIDE, AND POTASSIUM CHLORIDE 75 ML/HR: 5; .9; .15 INJECTION INTRAVENOUS at 11:17

## 2022-08-01 RX ADMIN — PANTOPRAZOLE SODIUM 40 MG: 40 INJECTION, POWDER, FOR SOLUTION INTRAVENOUS at 22:11

## 2022-08-01 RX ADMIN — METOCLOPRAMIDE HYDROCHLORIDE 10 MG: 5 INJECTION INTRAMUSCULAR; INTRAVENOUS at 06:05

## 2022-08-01 RX ADMIN — GABAPENTIN 300 MG: 300 CAPSULE ORAL at 18:50

## 2022-08-01 RX ADMIN — BUPROPION HYDROCHLORIDE 150 MG: 150 TABLET, FILM COATED, EXTENDED RELEASE ORAL at 10:00

## 2022-08-01 RX ADMIN — LEVALBUTEROL HYDROCHLORIDE 1.25 MG: 1.25 SOLUTION, CONCENTRATE RESPIRATORY (INHALATION) at 20:21

## 2022-08-01 RX ADMIN — SUCRALFATE 1 G: 1 TABLET ORAL at 12:27

## 2022-08-01 RX ADMIN — LEVALBUTEROL HYDROCHLORIDE 1.25 MG: 1.25 SOLUTION, CONCENTRATE RESPIRATORY (INHALATION) at 08:12

## 2022-08-01 RX ADMIN — BUDESONIDE AND FORMOTEROL FUMARATE DIHYDRATE 2 PUFF: 160; 4.5 AEROSOL RESPIRATORY (INHALATION) at 19:39

## 2022-08-01 RX ADMIN — METHOCARBAMOL 750 MG: 750 TABLET ORAL at 01:25

## 2022-08-01 RX ADMIN — GABAPENTIN 300 MG: 300 CAPSULE ORAL at 22:10

## 2022-08-01 RX ADMIN — ACETAMINOPHEN 975 MG: 325 TABLET ORAL at 18:49

## 2022-08-01 RX ADMIN — DABIGATRAN ETEXILATE MESYLATE 150 MG: 150 CAPSULE ORAL at 10:00

## 2022-08-01 RX ADMIN — IPRATROPIUM BROMIDE 0.5 MG: 0.5 SOLUTION RESPIRATORY (INHALATION) at 08:12

## 2022-08-01 RX ADMIN — IPRATROPIUM BROMIDE 0.5 MG: 0.5 SOLUTION RESPIRATORY (INHALATION) at 20:21

## 2022-08-01 RX ADMIN — DABIGATRAN ETEXILATE MESYLATE 150 MG: 150 CAPSULE ORAL at 22:09

## 2022-08-01 RX ADMIN — METHOCARBAMOL 750 MG: 750 TABLET ORAL at 12:27

## 2022-08-01 NOTE — PROGRESS NOTES
[facility]  Progress Note - Gina Moment 1970, 47 y o  male MRN: [id number]  Unit/Bed#: W -41 Encounter: [id number]  Primary Care Provider: Guerita Kaba MD   Date and time admitted to hospital: 7/26/2022 10:55 PM    * Intractable abdominal pain  Assessment & Plan  · Presents with intractable abdominal pain and nausea with vomiting  Has been refusing to eat and nauseous and dry heaving  · No discrete evidence of acute abdominal pathology on CT scan, would continue with conservative management  · Appears to be improving today in regards to appetite  Without vomiting today   · General surgery and GI following  · Continue scheduled Phenergan and Reglan today  · Continue PPI and Carafate   · Continue lo-fat, low residue diet    S/p small bowel obstruction  Assessment & Plan  · Had lengthy hospitalization in June of 2022 for SBO  · No evidence of SBO on CT scan  · Surgery evaluated, no inpatient surgical interventions anticipated besides medical management  · Appreciate input     Chronic respiratory failure with hypoxia (HCC)  Assessment & Plan  · Chronically on 2-4 L secondary to CHF, morbid obesity and hypoventilation syndrome  · Monitor O2 status, continue current O2 level     Morbid obesity (HCC)  Assessment & Plan  · Body mass index is 71 13 kg/m²  · Will need some dietary modifications as exercise will be difficult for this patient    Chronic diastolic heart failure [facility])  Assessment & Plan  Wt Readings from Last 3 Encounters:   07/30/22 (!) 231 kg (510 lb)   07/23/22 (!) 231 kg (510 lb)   07/19/22 (!) 228 kg (503 lb)     · Will hold diuretics for now given poor oral intake  · Resume when able to consistently tolerate PO  · Potentially can restart later today after he tolerates breakfast     Atrial fibrillation (HCC)  Assessment & Plan  · Rate controlled   · Continue digoxin and restart Pradaxa  · Restarted PO Metoprolol at home dose 8/1  · Monitor heart rate      QT prolongation  Assessment & Plan  · History of QT prolongation noted  · He is on both Reglan and Phenergan  · QTc 389    History of DVT (deep vein thrombosis)  Assessment & Plan  · Patient appears to be tolerating solids better  · Restart Pradaxa    Hypokalemia-resolved as of 2022  Assessment & Plan  · Resolved         VTE Pharmacologic Prophylaxis: VTE Score: 4 Moderate Risk (Score 3-4) - Pharmacological DVT Prophylaxis Ordered: dabigatran (Pradaxa)  Patient Centered Rounds: I performed bedside rounds with nursing staff today  Discussions with Specialists or Other Care Team Provider: Discussed with RN, CM    Education and Discussions with Family / Patient: Updated  (significant other) via phone  Time Spent for Care: 30 minutes  More than 50% of total time spent on counseling and coordination of care as described above  Current Length of Stay: 3 day(s)  Current Patient Status: Inpatient   Certification Statement: The patient will continue to require additional inpatient hospital stay due to monitoring for advancement of diet   Discharge Plan: Anticipate discharge later today or tomorrow to prior assisted or independent living facility  Code Status: Level 1 - Full Code    Subjective:   Patient states that he feels that he is slightly better today  Denies abdominal pain or vomiting overnight  Wants to try some breakfast and maybe have his diet advanced  Denies fevers or chills  Objective:     Vitals:   Temp (24hrs), Av 8 °F (36 6 °C), Min:97 2 °F (36 2 °C), Max:98 6 °F (37 °C)    Temp:  [97 2 °F (36 2 °C)-98 6 °F (37 °C)] 97 5 °F (36 4 °C)  HR:  [77-90] 90  Resp:  [16-19] 16  BP: (100-123)/(48-55) 123/50  SpO2:  [89 %-100 %] 89 %  Body mass index is 71 13 kg/m²  Input and Output Summary (last 24 hours):      Intake/Output Summary (Last 24 hours) at 2022 0906  Last data filed at 2022 0612  Gross per 24 hour   Intake 820 ml   Output 525 ml   Net 295 ml       Physical Exam:   Physical Exam  Constitutional:       General: He is not in acute distress  Appearance: Normal appearance  He is morbidly obese  He is not ill-appearing or diaphoretic  Interventions: Nasal cannula in place  HENT:      Head: Normocephalic and atraumatic  Mouth/Throat:      Mouth: Mucous membranes are dry  Eyes:      General: No scleral icterus  Pupils: Pupils are equal, round, and reactive to light  Cardiovascular:      Rate and Rhythm: Normal rate and regular rhythm  Pulses: Normal pulses  Heart sounds: Normal heart sounds, S1 normal and S2 normal  No murmur heard  No systolic murmur is present  No diastolic murmur is present  No gallop  No S3 or S4 sounds  Pulmonary:      Effort: Pulmonary effort is normal  No accessory muscle usage or respiratory distress  Breath sounds: Normal breath sounds  No stridor  No decreased breath sounds, wheezing, rhonchi or rales  Chest:      Chest wall: No tenderness  Abdominal:      General: Bowel sounds are normal  There is no distension  Palpations: Abdomen is soft  Tenderness: There is no abdominal tenderness  There is no guarding  Musculoskeletal:      Right lower leg: No edema  Left lower leg: No edema  Skin:     General: Skin is warm and dry  Coloration: Skin is not jaundiced  Neurological:      General: No focal deficit present  Mental Status: He is alert  Mental status is at baseline  Motor: No tremor or seizure activity  Psychiatric:         Behavior: Behavior is cooperative            Additional Data:     Labs:  Results from last 7 days   Lab Units 07/31/22  0843   WBC Thousand/uL 4 51   HEMOGLOBIN g/dL 8 2*   HEMATOCRIT % 29 1*   PLATELETS Thousands/uL 201   NEUTROS PCT % 51   LYMPHS PCT % 35   MONOS PCT % 13*   EOS PCT % 1     Results from last 7 days   Lab Units 08/01/22  0809   SODIUM mmol/L 135   POTASSIUM mmol/L 3 5   CHLORIDE mmol/L 90*   CO2 mmol/L 41*   BUN mg/dL 5 CREATININE mg/dL 0 47*   ANION GAP mmol/L 4   CALCIUM mg/dL 8 3*   ALBUMIN g/dL 2 3*   TOTAL BILIRUBIN mg/dL 0 82   ALK PHOS U/L 115*   ALT U/L 12   AST U/L 42*   GLUCOSE RANDOM mg/dL 87                       Lines/Drains:  Invasive Devices  Report    Peripheral Intravenous Line  Duration           Peripheral IV 07/31/22 Right;Ventral (anterior) Forearm 1 day          Drain  Duration           Open Drain Medial RUQ 17 days                      Imaging: No pertinent imaging reviewed      Recent Cultures (last 7 days):         Last 24 Hours Medication List:   Current Facility-Administered Medications   Medication Dose Route Frequency Provider Last Rate    acetaminophen  975 mg Oral [facility] Polo Vasquez PA-C      aluminum-magnesium hydroxide-simethicone  30 mL Oral Q6H PRN Polo Vasquez PA-C      budesonide-formoterol  2 puff Inhalation BID Polo Vasquez PA-C      buPROPion  150 mg Oral Daily Polo Vasquez PA-C      dabigatran etexilate  150 mg Oral Q12H [address] Eze Lynch PA-C      dextrose 5 % and sodium chloride 0 9 % with KCl 20 mEq/L  75 mL/hr Intravenous Continuous Polo Vasquez PA-C 75 mL/hr (07/31/22 2146)    dicyclomine  20 mg Oral 4x Daily PRN RADHA Cummins      digoxin  250 mcg Intravenous Daily Polo Vasquez PA-C      docusate sodium  100 mg Oral BID Polo Vasquez PA-C      gabapentin  300 mg Oral TID Polo Vasquez PA-C      HYDROmorphone  1 mg Intravenous Q4H PRN Jae Jenkins MD      ipratropium  0 5 mg Nebulization Q6H PRN Muna Bartholomew MD      levalbuterol  1 25 mg Nebulization Q6H PRN Muna Bartholomew MD      levothyroxine  25 mcg Oral Early Morning Polo Vasquez PA-C      methocarbamol  750 mg Oral Q6H [address] Polo Vasquez PA-C      metoclopramide  10 mg Intravenous TID RADHA Waite      metoprolol tartrate  25 mg Oral Q8H [address] Eze Hebert PA-C      ondansetron  4 mg Intravenous Q4H PRN Muna Bartholomew MD  oxyCODONE  10 mg Oral Q4H PRN Tali Laboy PA-C      oxyCODONE  5 mg Oral Q4H PRN Tali Laboy PA-C      pantoprazole  40 mg Intravenous Q12H Austen Araya MD      polyethylene glycol  17 g Oral Daily Tali Laboy PA-C      QUEtiapine  50 mg Oral HS Tali Laboy PA-C      sodium chloride  1 spray Each Nare Q1H PRN Tali Laboy PA-C      sodium chloride  2 g Oral TID Tali Laboy PA-C      sucralfate  1 g Oral 4x Daily [facility] SCREVEN & HS) RADHA Romero          Today, Patient Was Seen By: Sánchez Lynn PA-C    **Please Note: This note may have been constructed using a voice recognition system  **

## 2022-08-01 NOTE — ASSESSMENT & PLAN NOTE
Wt Readings from Last 3 Encounters:   07/30/22 (!) 231 kg (510 lb)   07/23/22 (!) 231 kg (510 lb)   07/19/22 (!) 228 kg (503 lb)     · Will hold diuretics for now given poor oral intake    · Resume when able to consistently tolerate PO  · Potentially can restart later today after he tolerates breakfast

## 2022-08-01 NOTE — ASSESSMENT & PLAN NOTE
· Had lengthy hospitalization in June of 2022 for SBO  · No evidence of SBO on CT scan  · Surgery evaluated, no inpatient surgical interventions anticipated besides medical management    · Appreciate input

## 2022-08-01 NOTE — PROGRESS NOTES
Progress Note - General Surgery   Lenin Kristal 47 y o  male MRN: [id number]  Unit/Bed#: W -27 Encounter: [id number]    Assessment:  Patient is a 55 y  o  male w/ BMI of 71 PMHx of SBO and large ventral hernia s/p Ex lap, repair of serosal tears, bridging mesh, STSG (June-July 2022)  w/  Known EC fistula p/w decreased PO intake  No evidence of obstruction  Plan:  · Full liquids, ensures  ? Continue calorie count in order to better identify nutritional intake status  · I/Os- Please record fistula output  · Appreciate GI input  ? Dysmotility vs bezoar  ? Reglan and PRN Zofran  · Local Wound Care to STSG site and abdominal wound  Use Vaseline gauze to help with non sticking  · PT/OT  · Add remeron  · Please [employer] on call [facility] Surgery Role with any questions       Subjective/Objective     Subjective: No acute events overnight  Patient states his abdominal pain is much better  He endorses nausea but denies vomiting  His fistula is still producing diarrhea type liquid  He is tolerating PO diet and has continued to work to increase his PO consumption  Objective:     Blood pressure 123/50, pulse 90, temperature 97 5 °F (36 4 °C), temperature source Oral, resp  rate 16, height 5' 11" (1 803 m), weight (!) 231 kg (510 lb), SpO2 (!) 89 %  ,Body mass index is 71 13 kg/m²  Intake/Output Summary (Last 24 hours) at 8/1/2022 0930  Last data filed at 8/1/2022 0240  Gross per 24 hour   Intake 820 ml   Output 525 ml   Net 295 ml       Invasive Devices  Report    Peripheral Intravenous Line  Duration           Peripheral IV 07/31/22 Right;Ventral (anterior) Forearm 1 day          Drain  Duration           Open Drain Medial RUQ 17 days                Physical Exam  Vitals and nursing note reviewed  Constitutional:       Appearance: He is well-developed  He is obese  HENT:      Head: Normocephalic and atraumatic     Eyes:      Conjunctiva/sclera: Conjunctivae normal    Cardiovascular:      Rate and Rhythm: Normal rate and regular rhythm  Heart sounds: No murmur heard  Pulmonary:      Effort: Pulmonary effort is normal  No respiratory distress  Breath sounds: Normal breath sounds  Abdominal:      General: There is no distension  Tenderness: There is abdominal tenderness in the left upper quadrant  Comments: Wounds producing murky green slough  Ostomy producing diarrheal type fluid  Musculoskeletal:      Cervical back: Neck supple  Comments: Green murky slough on top of the L leg donor graft site  Skin:     General: Skin is warm and dry              Scheduled Meds:  Current Facility-Administered Medications   Medication Dose Route Frequency Provider Last Rate    acetaminophen  975 mg Oral [facility] Yasemin Richter PA-C      aluminum-magnesium hydroxide-simethicone  30 mL Oral Q6H PRN Yasemin Richter PA-C      budesonide-formoterol  2 puff Inhalation BID Yasemin Richter PA-C      buPROPion  150 mg Oral Daily Yasemin Richter PA-C      dabigatran etexilate  150 mg Oral Q12H [facility] Eze Lynch PA-C      dextrose 5 % and sodium chloride 0 9 % with KCl 20 mEq/L  75 mL/hr Intravenous Continuous Yasemin Richter PA-C 75 mL/hr (07/31/22 2146)    dicyclomine  20 mg Oral 4x Daily PRN RADHA Beauchamp      digoxin  250 mcg Intravenous Daily Yasemin Richter PA-C      docusate sodium  100 mg Oral BID Yasemin Richter PA-C      gabapentin  300 mg Oral TID Yasemin Richter PA-C      HYDROmorphone  1 mg Intravenous Q4H PRN Parvez Reynoso MD      ipratropium  0 5 mg Nebulization Q6H PRN Pam Giang MD      levalbuterol  1 25 mg Nebulization Q6H PRN Pam Giang MD      levothyroxine  25 mcg Oral Early Morning Yasemin Richter PA-C      methocarbamol  750 mg Oral Q6H [facility] Yasemin Richter PA-C      metoclopramide  10 mg Intravenous TID RADHA Waite      metoprolol tartrate  25 mg Oral Q8H [facility] Eze Lynch PA-C      ondansetron  4 mg Intravenous Q4H PRN Melissa Santiago MD      oxyCODONE  10 mg Oral Q4H PRN Jule Koyanagi, PA-C      oxyCODONE  5 mg Oral Q4H PRN Jule Koyanagi, PA-C      pantoprazole  40 mg Intravenous Q12H Austen Araya MD      polyethylene glycol  17 g Oral Daily Jule Koyanagi, PA-C      QUEtiapine  50 mg Oral HS Jule Koyanagi, PA-C      sodium chloride  1 spray Each Nare Q1H PRN Jule Koyanagi, PA-C      sodium chloride  2 g Oral TID Jule Koyanagi, PA-C      sucralfate  1 g Oral 4x Daily (AC & HS) RADHA Andreson       Continuous Infusions:dextrose 5 % and sodium chloride 0 9 % with KCl 20 mEq/L, 75 mL/hr, Last Rate: 75 mL/hr (07/31/22 2146)      PRN Meds:   aluminum-magnesium hydroxide-simethicone    dicyclomine    HYDROmorphone    ipratropium    levalbuterol    ondansetron    oxyCODONE    oxyCODONE    sodium chloride      Lab, Imaging and other studies:  I have personally reviewed pertinent lab results    , CBC: No results found for: WBC, HGB, HCT, MCV, PLT, ADJUSTEDWBC, MCH, MCHC, RDW, MPV, NRBC, CMP:   Lab Results   Component Value Date    SODIUM 135 08/01/2022    K 3 5 08/01/2022    CL 90 (L) 08/01/2022    CO2 41 (H) 08/01/2022    BUN 5 08/01/2022    CREATININE 0 47 (L) 08/01/2022    CALCIUM 8 3 (L) 08/01/2022    AST 42 (H) 08/01/2022    ALT 12 08/01/2022    ALKPHOS 115 (H) 08/01/2022    EGFR 128 08/01/2022   , Coagulation: No results found for: PT, INR, APTT, Urinalysis: No results found for: COLORU, CLARITYU, SPECGRAV, PHUR, LEUKOCYTESUR, NITRITE, PROTEINUA, GLUCOSEU, KETONESU, BILIRUBINUR, BLOODU, Amylase: No results found for: AMYLASE, Lipase: No results found for: LIPASE  VTE Pharmacologic Prophylaxis: pradaxa  VTE Mechanical Prophylaxis: sequential compression device      Duane Alvarez MD  8/1/2022 9:30 AM

## 2022-08-01 NOTE — PROGRESS NOTES
Progress Note - Gina Levy 47 y o  male MRN: [id number]    Unit/Bed#: W -01 Encounter: [id number]    Assessment and Plan:     1  Epigastric discomfort with nausea, decreased appetite  Previous EGD during earlier admission this year with large food bezoar  Abdominal pain also possibly secondary to gastroparesis, gastritis, peptic ulcer disease  No evidence of bowel obstruction  -patient is on Reglan 10 mg t i d   Could consider continuing this however would try to avoid long-term use due to possible side effects  Recent     -Zofran as needed     -continue PPI b i d  And Carafate  -continue slow advancement of diet  With low-fiber/low residue   -no indication for repeat EGD at this time   -management of constipation to avoid worsening abdominal pain, nausea, vomiting and decreased appetite  -MiraLax daily  Stool softeners as needed  -consider Remeron as per primary team   Would recommend holding off on this for now as patient seems to be improving   -avoid narcotics     ----------------------------------------------------------------------------------------------------------------    Subjective:     Patient reports doing well this morning  He was able to tolerate some cream of Wheat this morning and ginger ale and looking for to his lunch  He still has some generalized abdominal pain which she reports is a slightly better  No nausea or vomiting in 2 days  Reports 1 bowel movement today  Objective:     Vitals: Blood pressure 123/50, pulse 90, temperature 97 5 °F (36 4 °C), temperature source Oral, resp  rate 16, height 5' 11" (1 803 m), weight (!) 231 kg (510 lb), SpO2 (!) 89 %  ,Body mass index is 71 13 kg/m²        Intake/Output Summary (Last 24 hours) at 8/1/2022 1410  Last data filed at 8/1/2022 1117  Gross per 24 hour   Intake 610 ml   Output 75 ml   Net 535 ml       Physical Exam:     General Appearance: Alert, appears stated age and cooperative  Lungs: Clear to auscultation bilaterally, no rales or rhonchi, no labored breathing/accessory muscle use  Heart: Regular rate and rhythm, S1, S2 normal, no murmur, click, rub or gallop  Abdomen: + large abdominal wound with purulent white/green material   Reports tenderness to palpation throughout  Extremities: No cyanosis, clubbing, or edema    Invasive Devices  Report    Peripheral Intravenous Line  Duration           Peripheral IV 07/31/22 Right;Ventral (anterior) Forearm 1 day          Drain  Duration           Open Drain Medial RUQ 17 days                Lab Results:  Results from last 7 days   Lab Units 07/31/22  0843   WBC Thousand/uL 4 51   HEMOGLOBIN g/dL 8 2*   HEMATOCRIT % 29 1*   PLATELETS Thousands/uL 201   NEUTROS PCT % 51   LYMPHS PCT % 35   MONOS PCT % 13*   EOS PCT % 1     Results from last 7 days   Lab Units 08/01/22  0809   POTASSIUM mmol/L 3 5   CHLORIDE mmol/L 90*   CO2 mmol/L 41*   BUN mg/dL 5   CREATININE mg/dL 0 47*   CALCIUM mg/dL 8 3*   ALK PHOS U/L 115*   ALT U/L 12   AST U/L 42*     Invalid input(s): BILI      Results from last 7 days   Lab Units 07/26/22  2335   LIPASE u/L 18       Imaging Studies: I have personally reviewed pertinent imaging studies  XR chest 1 view portable    Result Date: 7/27/2022  Impression: No significant interval change since prior examinations  Pulmonary vascular congestion without definite new focal airspace consolidation  Workstation performed: [id number]     CT abdomen pelvis with contrast    Result Date: 7/27/2022  Impression: 1  Technically limited study  2   Postoperative change as noted with large ventral abdominal wall hernia containing loops of large and small bowel without evidence for strangulation or obstruction  Ventral abdominal wall wound appears dehiscent with presumed Abthera in place and findings consistent with known enterocutaneous fistula  3   Additional findings as noted   Workstation performed: [id number]

## 2022-08-01 NOTE — ASSESSMENT & PLAN NOTE
· Presents with intractable abdominal pain and nausea with vomiting  Has been refusing to eat and nauseous and dry heaving  · No discrete evidence of acute abdominal pathology on CT scan, would continue with conservative management  · Appears to be improving today in regards to appetite  Without vomiting today   · General surgery and GI following  · Continue scheduled Phenergan and Reglan today    · Continue PPI and Carafate   · Continue lo-fat, low residue diet

## 2022-08-01 NOTE — ASSESSMENT & PLAN NOTE
· Chronically on 2-4 L secondary to CHF, morbid obesity and hypoventilation syndrome  · Monitor O2 status, continue current O2 level

## 2022-08-01 NOTE — ASSESSMENT & PLAN NOTE
· Rate controlled   · Continue digoxin and restart Pradaxa  · Restarted PO Metoprolol at home dose 8/1  · Monitor heart rate

## 2022-08-02 PROCEDURE — 94003 VENT MGMT INPAT SUBQ DAY: CPT

## 2022-08-02 PROCEDURE — 94760 N-INVAS EAR/PLS OXIMETRY 1: CPT

## 2022-08-02 PROCEDURE — 99024 POSTOP FOLLOW-UP VISIT: CPT | Performed by: SURGERY

## 2022-08-02 PROCEDURE — C9113 INJ PANTOPRAZOLE SODIUM, VIA: HCPCS | Performed by: INTERNAL MEDICINE

## 2022-08-02 PROCEDURE — 99232 SBSQ HOSP IP/OBS MODERATE 35: CPT | Performed by: PHYSICIAN ASSISTANT

## 2022-08-02 RX ORDER — POTASSIUM CHLORIDE 20 MEQ/1
20 TABLET, EXTENDED RELEASE ORAL DAILY
Status: DISCONTINUED | OUTPATIENT
Start: 2022-08-02 | End: 2022-08-03 | Stop reason: HOSPADM

## 2022-08-02 RX ORDER — TORSEMIDE 20 MG/1
40 TABLET ORAL
Status: DISCONTINUED | OUTPATIENT
Start: 2022-08-02 | End: 2022-08-03 | Stop reason: HOSPADM

## 2022-08-02 RX ORDER — SPIRONOLACTONE 25 MG/1
50 TABLET ORAL DAILY
Status: DISCONTINUED | OUTPATIENT
Start: 2022-08-02 | End: 2022-08-03 | Stop reason: HOSPADM

## 2022-08-02 RX ADMIN — PANTOPRAZOLE SODIUM 40 MG: 40 INJECTION, POWDER, FOR SOLUTION INTRAVENOUS at 07:56

## 2022-08-02 RX ADMIN — OXYCODONE HYDROCHLORIDE 10 MG: 10 TABLET ORAL at 14:26

## 2022-08-02 RX ADMIN — DEXTROSE, SODIUM CHLORIDE, AND POTASSIUM CHLORIDE 75 ML/HR: 5; .9; .15 INJECTION INTRAVENOUS at 00:06

## 2022-08-02 RX ADMIN — POTASSIUM CHLORIDE 20 MEQ: 1500 TABLET, EXTENDED RELEASE ORAL at 11:07

## 2022-08-02 RX ADMIN — SUCRALFATE 1 G: 1 TABLET ORAL at 21:10

## 2022-08-02 RX ADMIN — ACETAMINOPHEN 975 MG: 325 TABLET ORAL at 13:39

## 2022-08-02 RX ADMIN — SUCRALFATE 1 G: 1 TABLET ORAL at 06:10

## 2022-08-02 RX ADMIN — PANTOPRAZOLE SODIUM 40 MG: 40 INJECTION, POWDER, FOR SOLUTION INTRAVENOUS at 21:11

## 2022-08-02 RX ADMIN — GABAPENTIN 300 MG: 300 CAPSULE ORAL at 21:10

## 2022-08-02 RX ADMIN — TORSEMIDE 40 MG: 20 TABLET ORAL at 16:18

## 2022-08-02 RX ADMIN — SODIUM CHLORIDE 2 G: 1 TABLET ORAL at 16:18

## 2022-08-02 RX ADMIN — SUCRALFATE 1 G: 1 TABLET ORAL at 16:18

## 2022-08-02 RX ADMIN — METHOCARBAMOL 750 MG: 750 TABLET ORAL at 17:43

## 2022-08-02 RX ADMIN — QUETIAPINE FUMARATE 50 MG: 25 TABLET ORAL at 21:10

## 2022-08-02 RX ADMIN — DABIGATRAN ETEXILATE MESYLATE 150 MG: 150 CAPSULE ORAL at 09:53

## 2022-08-02 RX ADMIN — ACETAMINOPHEN 975 MG: 325 TABLET ORAL at 21:10

## 2022-08-02 RX ADMIN — METHOCARBAMOL 750 MG: 750 TABLET ORAL at 06:10

## 2022-08-02 RX ADMIN — BUDESONIDE AND FORMOTEROL FUMARATE DIHYDRATE 2 PUFF: 160; 4.5 AEROSOL RESPIRATORY (INHALATION) at 17:44

## 2022-08-02 RX ADMIN — LEVOTHYROXINE SODIUM 25 MCG: 25 TABLET ORAL at 06:10

## 2022-08-02 RX ADMIN — SODIUM CHLORIDE 2 G: 1 TABLET ORAL at 21:10

## 2022-08-02 RX ADMIN — METOCLOPRAMIDE HYDROCHLORIDE 10 MG: 5 INJECTION INTRAMUSCULAR; INTRAVENOUS at 16:18

## 2022-08-02 RX ADMIN — METOCLOPRAMIDE HYDROCHLORIDE 10 MG: 5 INJECTION INTRAMUSCULAR; INTRAVENOUS at 11:20

## 2022-08-02 RX ADMIN — METOCLOPRAMIDE HYDROCHLORIDE 10 MG: 5 INJECTION INTRAMUSCULAR; INTRAVENOUS at 06:08

## 2022-08-02 RX ADMIN — SPIRONOLACTONE 50 MG: 25 TABLET ORAL at 11:07

## 2022-08-02 RX ADMIN — BUDESONIDE AND FORMOTEROL FUMARATE DIHYDRATE 2 PUFF: 160; 4.5 AEROSOL RESPIRATORY (INHALATION) at 09:52

## 2022-08-02 RX ADMIN — HYDROMORPHONE HYDROCHLORIDE 1 MG: 1 INJECTION, SOLUTION INTRAMUSCULAR; INTRAVENOUS; SUBCUTANEOUS at 16:18

## 2022-08-02 RX ADMIN — DABIGATRAN ETEXILATE MESYLATE 150 MG: 150 CAPSULE ORAL at 21:11

## 2022-08-02 RX ADMIN — METHOCARBAMOL 750 MG: 750 TABLET ORAL at 11:07

## 2022-08-02 RX ADMIN — ACETAMINOPHEN 975 MG: 325 TABLET ORAL at 06:10

## 2022-08-02 RX ADMIN — GABAPENTIN 300 MG: 300 CAPSULE ORAL at 16:18

## 2022-08-02 RX ADMIN — GABAPENTIN 300 MG: 300 CAPSULE ORAL at 09:53

## 2022-08-02 RX ADMIN — DOCUSATE SODIUM 100 MG: 100 CAPSULE, LIQUID FILLED ORAL at 09:53

## 2022-08-02 RX ADMIN — DIGOXIN 250 MCG: 0.25 INJECTION INTRAMUSCULAR; INTRAVENOUS at 11:26

## 2022-08-02 RX ADMIN — DICYCLOMINE HYDROCHLORIDE 20 MG: 20 TABLET ORAL at 10:00

## 2022-08-02 RX ADMIN — BUPROPION HYDROCHLORIDE 150 MG: 150 TABLET, FILM COATED, EXTENDED RELEASE ORAL at 09:53

## 2022-08-02 RX ADMIN — METOPROLOL TARTRATE 25 MG: 25 TABLET, FILM COATED ORAL at 13:39

## 2022-08-02 RX ADMIN — SUCRALFATE 1 G: 1 TABLET ORAL at 11:07

## 2022-08-02 NOTE — PROGRESS NOTES
Progress Note - General Surgery   Ziyad Ayers 47 y o  male MRN: [id number]  Unit/Bed#: W -01 Encounter: [id number]    Assessment:  48yoM w history of SBO, large ventral hernia, s/p ex lap w repair of serosal tears, bridging mesh, STSG in June of 2022, known EC fistula, p/w decreased p o  Intake without any signs of obstruction    Vitals stable, afebrile, CPAP overnight  Labs pending    Wound manager/EC fistula 60cc    Stool x3    Plan:  - low residue diet, calorie count in progress  - maintenance IVF @75  - GI on board, appreciate recommendations  - wound manager to monitor EC fistula output  - local wound care per primary team  - DVT PPX  - pain control  - encourage ambulation   - care per primary team    Subjective/Objective   Subjective:   Patient reports some abdominal pain after eating, denies any abdominal pain at rest, otherwise denies any nausea or emesis, is passing flatus and having bowel movements, limited out of bed ambulating, voiding without issues  Objective:     Blood pressure 102/50, pulse 61, temperature 98 °F (36 7 °C), temperature source Oral, resp  rate 18, height 5' 11" (1 803 m), weight (!) 231 kg (510 lb), SpO2 97 %  ,Body mass index is 71 13 kg/m²        Intake/Output Summary (Last 24 hours) at 8/2/2022 0550  Last data filed at 8/2/2022 0448  Gross per 24 hour   Intake 370 ml   Output 735 ml   Net -365 ml       Invasive Devices  Report    Peripheral Intravenous Line  Duration           Peripheral IV 07/31/22 Right;Ventral (anterior) Forearm 1 day          Drain  Duration           Open Drain Medial RUQ 18 days                Physical Exam:  General: NAD  Skin: Warm, dry, anicteric  HEENT: Normocephalic, atraumatic  CV: RRR, no m/r/g  Pulm: CTA b/l, no inc WOB  Abd: Soft, ND/NT, wound manager present for EC fistula, midline wounds appear clean  MSK: Symmetric, no edema, no tenderness, no deformity  Neuro: AOx3, GCS 15    Lab, Imaging and other studies:  I have personally reviewed pertinent lab results    , CBC: No results found for: WBC, HGB, HCT, MCV, PLT, ADJUSTEDWBC, MCH, MCHC, RDW, MPV, NRBC, CMP:   Lab Results   Component Value Date    SODIUM 135 08/01/2022    K 3 5 08/01/2022    CL 90 (L) 08/01/2022    CO2 41 (H) 08/01/2022    BUN 5 08/01/2022    CREATININE 0 47 (L) 08/01/2022    CALCIUM 8 3 (L) 08/01/2022    AST 42 (H) 08/01/2022    ALT 12 08/01/2022    ALKPHOS 115 (H) 08/01/2022    EGFR 128 08/01/2022     VTE Pharmacologic Prophylaxis: Sequential compression device (Venodyne)   VTE Mechanical Prophylaxis: sequential compression device

## 2022-08-02 NOTE — PROGRESS NOTES
[facility]  Progress Note - Cathi Garza 1970, 47 y o  male MRN: [id number]  Unit/Bed#: W -94 Encounter: [id number]  Primary Care Provider: Alexandria Palmer MD   Date and time admitted to hospital: 7/26/2022 10:55 PM    * Intractable abdominal pain  Assessment & Plan  · Presents with intractable abdominal pain and nausea with vomiting  Has been refusing to eat and nauseous and dry heaving  · No discrete evidence of acute abdominal pathology on CT scan, would continue with conservative management  · Appears to be improving today in regards to appetite  Without vomiting today   · General surgery and GI following  · Continue scheduled Phenergan and Reglan today  · Continue PPI and Carafate   · Continue lo-fat, low residue diet    Atrial fibrillation (HCC)  Assessment & Plan  · Rate controlled   · Continue digoxin and restart Pradaxa  · Restarted PO Metoprolol at home dose 8/1  · Monitor heart rate  Chronic diastolic heart failure [facility])  Assessment & Plan  Wt Readings from Last 3 Encounters:   07/30/22 (!) 231 kg (510 lb)   07/23/22 (!) 231 kg (510 lb)   07/19/22 (!) 228 kg (503 lb)     · Initially his torsemide was held secondary to poor p o  Intake  · Can resume today    Chronic respiratory failure with hypoxia (HCC)  Assessment & Plan  · Chronically on 2-4 L secondary to CHF, morbid obesity and hypoventilation syndrome  · Monitor O2 status, continue current O2 level     History of DVT (deep vein thrombosis)  Assessment & Plan  · Patient appears to be tolerating solids better  · Restarted Pradaxa    QT prolongation  Assessment & Plan  · History of QT prolongation noted  · He is on both Reglan and Phenergan  · QTc 389    S/p small bowel obstruction  Assessment & Plan  · Had lengthy hospitalization in June of 2022 for SBO  · No evidence of SBO on CT scan  · Surgery evaluated, no inpatient surgical interventions anticipated besides medical management    · Appreciate input Morbid obesity ([address] )  Assessment & Plan  · Body mass index is 71 13 kg/m²  · Will need some dietary modifications as exercise will be difficult for this patient        VTE Pharmacologic Prophylaxis: VTE Score: 4 Moderate Risk (Score 3-4) - Pharmacological DVT Prophylaxis Ordered: dabigatran (Pradaxa)  Patient Centered Rounds: I performed bedside rounds with nursing staff today  Discussions with Specialists or Other Care Team Provider: cm, nursing    Education and Discussions with Family / Patient: Patient declined call to   Time Spent for Care: 30 minutes  More than 50% of total time spent on counseling and coordination of care as described above  Current Length of Stay: 4 day(s)  Current Patient Status: Inpatient   Certification Statement: The patient will continue to require additional inpatient hospital stay due to awaiting bariatric transport and insurance auth  [place] [facility] [place] Status: Level 1 - Full Code    Subjective:   P o  Intake has improved  No vomiting  Denies shortness of Breath  O2 status stable  Objective:     Vitals:   Temp (24hrs), Av 9 °F (36 6 °C), Min:97 8 °F (36 6 °C), Max:98 °F (36 7 °C)    Temp:  [97 8 °F (36 6 °C)-98 °F (36 7 °C)] 98 °F (36 7 °C)  HR:  [61-89] 80  Resp:  [18] 18  BP: ()/(46-50) 100/50  SpO2:  [97 %] 97 %  Body mass index is 71 13 kg/m²  Input and Output Summary (last 24 hours): Intake/Output Summary (Last 24 hours) at 2022 1013  Last data filed at 2022 0600  Gross per 24 hour   Intake 2250 ml   Output 660 ml   Net 1590 ml       Physical Exam:   Physical Exam  Vitals and nursing note reviewed  Constitutional:       General: He is not in acute distress  Appearance: Normal appearance  He is obese  Interventions: Nasal cannula in place  HENT:      Head: Normocephalic        Mouth/Throat:      Mouth: Mucous membranes are moist    Eyes:      Pupils: Pupils are equal, round, and reactive to light  Cardiovascular:      Rate and Rhythm: Normal rate and regular rhythm  Heart sounds: No murmur heard  Pulmonary:      Effort: Pulmonary effort is normal  No respiratory distress  Breath sounds: Normal breath sounds  No wheezing, rhonchi or rales  Abdominal:      General: Bowel sounds are normal  There is no distension  Palpations: Abdomen is soft  Tenderness: There is no abdominal tenderness  There is no guarding  Hernia: No hernia is present  Comments: + EC fistula   Musculoskeletal:         General: No deformity  Cervical back: Normal range of motion  Right lower leg: No edema  Left lower leg: No edema  Skin:     Capillary Refill: Capillary refill takes less than 2 seconds  Neurological:      General: No focal deficit present  Mental Status: He is alert and oriented to person, place, and time  Mental status is at baseline  Additional Data:     Labs:  Results from last 7 days   Lab Units 07/31/22  0843   WBC Thousand/uL 4 51   HEMOGLOBIN g/dL 8 2*   HEMATOCRIT % 29 1*   PLATELETS Thousands/uL 201   NEUTROS PCT % 51   LYMPHS PCT % 35   MONOS PCT % 13*   EOS PCT % 1     Results from last 7 days   Lab Units 08/01/22  0809   SODIUM mmol/L 135   POTASSIUM mmol/L 3 5   CHLORIDE mmol/L 90*   CO2 mmol/L 41*   BUN mg/dL 5   CREATININE mg/dL 0 47*   ANION GAP mmol/L 4   CALCIUM mg/dL 8 3*   ALBUMIN g/dL 2 3*   TOTAL BILIRUBIN mg/dL 0 82   ALK PHOS U/L 115*   ALT U/L 12   AST U/L 42*   GLUCOSE RANDOM mg/dL 87                       Lines/Drains:  Invasive Devices  Report    Peripheral Intravenous Line  Duration           Peripheral IV 07/31/22 Right;Ventral (anterior) Forearm 2 days          Drain  Duration           Open Drain Medial RUQ 18 days                      Imaging: No pertinent imaging reviewed      Recent Cultures (last 7 days):         Last 24 Hours Medication List:   Current Facility-Administered Medications   Medication Dose Route Frequency Provider Last Rate    acetaminophen  975 mg Oral [facility] Anastasia Pellet, JIMENA      aluminum-magnesium hydroxide-simethicone  30 mL Oral Q6H PRN Anasatsia Pellet, PA-JAMES      budesonide-formoterol  2 puff Inhalation BID Anastasia Pellet, PA-JAMES      buPROPion  150 mg Oral Daily Anastasia Pellet, PA-JAMES      dabigatran etexilate  150 mg Oral Q12H [facility] Eze Chaves PA-C      dicyclomine  20 mg Oral 4x Daily PRN RADHA House      digoxin  250 mcg Intravenous Daily Anastasia Pellet, JIMENA      docusate sodium  100 mg Oral BID Anastasia Pellet, JIMENA      gabapentin  300 mg Oral TID Anastasia Pellet, JIMENA      HYDROmorphone  1 mg Intravenous Q4H PRN Siria Levi MD      ipratropium  0 5 mg Nebulization Q6H PRN Siva Campos MD      levalbuterol  1 25 mg Nebulization Q6H PRN Siva Campos MD      levothyroxine  25 mcg Oral Early Morning Anastasia Pellet, JIMENA      methocarbamol  750 mg Oral Q6H [facility] Anastasia Pellet, JIMENA      metoclopramide  10 mg Intravenous TID AC RADHA Kerns      metoprolol tartrate  25 mg Oral Q8H [facility] Eze Chaves PA-C      ondansetron  4 mg Intravenous Q4H PRN Siva Campos MD      oxyCODONE  10 mg Oral Q4H PRN Anastasia Pellet, JIMENA      oxyCODONE  5 mg Oral Q4H PRN Anastasia Pellet, JIMENA      pantoprazole  40 mg Intravenous Q12H [facility] Nat Elias MD      polyethylene glycol  17 g Oral Daily Anastasia Pellet, JIMENA      potassium chloride  20 mEq Oral Daily Anastasia Pellet, PA-JAMES      QUEtiapine  50 mg Oral HS Anastasia Pellet, PA-JAMES      sodium chloride  1 spray Each Nare Q1H PRN Anastasia Pellet, PA-JAMES      sodium chloride  2 g Oral TID Anastasia Pellet, PA-JAMES      spironolactone  50 mg Oral Daily Anastasia Pellet, PA-JMAES      sucralfate  1 g Oral 4x Daily (AC & HS) RADHA House      torsemide  40 mg Oral BID (diuretic) Anastasia Lau PA-C          Today, Patient Was Seen By: Major Brar PA-C    **Please Note: This note may have been constructed using a voice recognition system  **

## 2022-08-02 NOTE — CASE MANAGEMENT
Case Management Assessment & Discharge Planning Note    Patient name Courtney Wahl  Location W /W -69 MRN [id number]  : 1970 Date 2022       Current Admission Date: 2022  Current Admission Diagnosis:Intractable abdominal pain   Patient Active Problem List    Diagnosis Date Noted    Hyponatremia 2022    Acute respiratory failure ([address] ) 2022    Hypertension 2022    Diabetes mellitus ([address] ) 2022    Depression 2022    Intractable abdominal pain 2022    Epidermoid cyst of neck 2022    Hypotension 2021    Chronic respiratory failure with hypoxia ([address] ) 2021    Cellulitis of multiple sites of head and neck 2021    Elevated troponin 2021    History of DVT (deep vein thrombosis) 2021    Positive blood culture 2021    QT prolongation 2021    COPD (chronic obstructive pulmonary disease) ([address] ) 05/10/2021    Hypothyroidism 05/10/2021    Medical non-compliance 2017    Atrial fibrillation ([address] ) 2017    Foot pain 11/15/2016    Knee pain 11/15/2016    S/p small bowel obstruction 2016    Recurrent bronchospasm 10/16/2016    Venous stasis dermatitis of both lower extremities 10/16/2016    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    Obesity hypoventilation syndrome ([address] ) 2016    Chronic diastolic heart failure (HCC) 2016    MRSA (methicillin resistant Staphylococcus aureus) Tracheobronchitis 2016    Atrial fibrillation with rapid ventricular response ([address] ) 08/15/2016    Morbid obesity ([address] ) 08/15/2016    Tremor 08/15/2016      LOS (days): 4  Geometric Mean LOS (GMLOS) (days):   Days to GMLOS:     OBJECTIVE:  PATIENT READMITTED TO HOSPITAL  Risk of Unplanned Readmission Score: 61 5         Current admission status: Inpatient       Preferred Pharmacy:   [address] [place] [address], [address] [place] [address]  Phone: [phone] Fax: [phone]    Primary Care Provider: Praful Worley MD    Primary Insurance: [address]  Secondary Insurance:     ASSESSMENT:  [address] , [address] Other   Primary Phone: [phone] (Mobile)  Home Phone: [phone] DETAILS:                                                          Transport at Discharge : [facility]  Dispatcher Contacted: Yes  Number/Name of Dispatcher: [address] [facility]     [phone]  Transported by [employer] and Unit #): Stanislav  ETA of Transport (Date): 08/03/22  ETA of Transport (Time): [address] : [place], [place]  Receiving Facility/Agency Phone Number: [phone] ext   [phone]  Facility/Agency Fax Number: [phone]

## 2022-08-02 NOTE — PROGRESS NOTES
Progress Note - General Surgery   General Luis 47 y o  male MRN: [id number]  Unit/Bed#: W -01 Encounter: [id number]    Assessment:  48yoM w history of SBO, large ventral hernia, s/p ex lap w repair of serosal tears, bridging mesh, STSG in June of 2022, known EC fistula, p/w decreased p o  Intake without any signs of obstruction    Fistula output- minimal    Plan:   Low res diet  o Monitor nutritional status/calorie count in place   Monitor Fistula output   GI recs appreciated  o PPI, carafate, Anti-nausea meds   Wound Care   PT/OT   Dispo Planning   Please [employer] on call [facility] Surgery Role with any questions     Subjective/Objective     Subjective:   No acute events overnight  Tolerating diet  Calorie count in place  Ate most of breakfast(omlette and ginger ale with banana) and 4th cup of potatoes for lunch  No recordings for dinner  Pertinent review of systems as above  All other review of systems negative  Objective:    Blood pressure (!) 108/46, pulse 69, temperature (!) 97 2 °F (36 2 °C), temperature source Oral, resp  rate 20, height 5' 11" (1 803 m), weight (!) 231 kg (510 lb), SpO2 97 %  ,Body mass index is 71 13 kg/m²  Intake/Output Summary (Last 24 hours) at 8/3/2022 2824  Last data filed at 8/3/2022 0606  Gross per 24 hour   Intake 320 ml   Output 200 ml   Net 120 ml       Invasive Devices  Report    Peripheral Intravenous Line  Duration           Peripheral IV 07/31/22 Right;Ventral (anterior) Forearm 2 days          Drain  Duration           Open Drain Medial RUQ 19 days                Physical Exam:   Gen:  NAD  HEENT: NCAT  MMM  CV: well perfused  Lungs: Normal respiratory effort  Abd: soft, nt/nd,dressings intact  Fistula appliance intact  Skin: warm/ dry  Extremities: no peripheral edema, no clubbing or cyanosis, dressings cdi  Neuro:  AxO x3      Results from last 7 days   Lab Units 07/31/22  0843 07/30/22  0936   WBC Thousand/uL 4 51 4 55   HEMOGLOBIN g/dL 8 2* 8 0* HEMATOCRIT % 29 1* 28 7*   PLATELETS Thousands/uL 201 203     Results from last 7 days   Lab Units 08/01/22  0809 07/31/22  0843 07/30/22  0936   POTASSIUM mmol/L 3 5 3 2* 3 4*   CHLORIDE mmol/L 90* 84* 85*   CO2 mmol/L 41* >45* >45*   BUN mg/dL 5 5 6   CREATININE mg/dL 0 47* 0 54* 0 61   CALCIUM mg/dL 8 3* 8 4 8 3*            I have personally reviewed pertinent films in PACS      Medications:   Scheduled Meds:  Current Facility-Administered Medications   Medication Dose Route Frequency Provider Last Rate    acetaminophen  975 mg Oral [facility] Candis Flick, PA-C      aluminum-magnesium hydroxide-simethicone  30 mL Oral Q6H PRN Candis Flick, PA-JAMES      budesonide-formoterol  2 puff Inhalation BID Candis Flick, PA-JAMES      buPROPion  150 mg Oral Daily Candis Flick, PA-JAMES      dabigatran etexilate  150 mg Oral Q12H [address] Eze Gregg PA-C      dicyclomine  20 mg Oral 4x Daily PRN RADHA Lundberg      digoxin  250 mcg Intravenous Daily Candis Flick, PAHOSEA      docusate sodium  100 mg Oral BID Candis Flick, PA-JAMES      gabapentin  300 mg Oral TID Candis Flick, JIMENA      HYDROmorphone  1 mg Intravenous Q4H PRN Sandra Mckay MD      ipratropium  0 5 mg Nebulization Q6H PRN Jim Bell MD      levalbuterol  1 25 mg Nebulization Q6H PRN Jim Bell MD      levothyroxine  25 mcg Oral Early Morning Candis Flick, PA-JAMES      methocarbamol  750 mg Oral Q6H [address] Candis Flick, JIMENA      metoclopramide  10 mg Intravenous TID RADHA Waite      metoprolol tartrate  25 mg Oral Q8H [address] Eze Gregg PA-C      ondansetron  4 mg Intravenous Q4H PRN Jim Bell MD      oxyCODONE  10 mg Oral Q4H PRN Candis Flick, JIMENA      oxyCODONE  5 mg Oral Q4H PRN Candis Flick, JIMENA      pantoprazole  40 mg Intravenous Q12H [address] Ricky Hall MD      polyethylene glycol  17 g Oral Daily Candis Uribe PA-C      potassium chloride  20 mEq Oral Daily Tali Laboy PA-C      QUEtiapine  50 mg Oral HS Tali Laboy PA-C      sodium chloride  1 spray Each Nare Q1H PRN Tali Laboy PA-C      sodium chloride  2 g Oral TID Tali Laboy PA-C      spironolactone  50 mg Oral Daily Tali Laboy PA-C      sucralfate  1 g Oral 4x Daily (AC & HS) Gissel Fears Thalacker, CRNP      torsemide  40 mg Oral BID (diuretic) Tali Laboy PA-C       Continuous Infusions:   PRN Meds:  aluminum-magnesium hydroxide-simethicone, 30 mL, Q6H PRN  dicyclomine, 20 mg, 4x Daily PRN  HYDROmorphone, 1 mg, Q4H PRN  ipratropium, 0 5 mg, Q6H PRN  levalbuterol, 1 25 mg, Q6H PRN  ondansetron, 4 mg, Q4H PRN  oxyCODONE, 10 mg, Q4H PRN  oxyCODONE, 5 mg, Q4H PRN  sodium chloride, 1 spray, Q1H PRN

## 2022-08-02 NOTE — RESPIRATORY THERAPY NOTE
Pt placed on CPAP for the night on current settings will continue to monitor pt    08/01/22 4811   Non-Invasive Information   O2 Interface Device Full face mask   Non-Invasive Ventilation Mode CPAP   $ Continous NIV Subsequent   SpO2 97 %   $ Pulse Oximetry Spot Check Charge Completed   Non-Invasive Settings   FiO2 (%) 30   PEEP/CPAP (cm H2O) 15   Non-Invasive Readings   Skin Intervention Skin intact   Total Rate 39   Spontaneous Vt (mL) 344   Spontaneous MV (mL) 13 3   Leak (lpm) 58   Non-Invasive Alarms   Insp Pressure High (cm H20) 30   Insp Pressure Low (cm H20) 10   Low Insp Pressure Time (sec) 20 sec   MV Low (L/min) 4   Vt High (mL) 1500   Vt Low (mL) 250   High Resp Rate (BPM) 50 BPM   Low Resp Rate (BPM) 8 BPM   Apnea Interval (sec) 20

## 2022-08-02 NOTE — ASSESSMENT & PLAN NOTE
Wt Readings from Last 3 Encounters:   07/30/22 (!) 231 kg (510 lb)   07/23/22 (!) 231 kg (510 lb)   07/19/22 (!) 228 kg (503 lb)     · Initially his torsemide was held secondary to poor p o  Intake    · Can resume today

## 2022-08-02 NOTE — CASE MANAGEMENT
Case Management Progress Note    Patient name Randell King  Location W /W -37 MRN [id number]  : 1970 Date 2022       LOS (days): 4  Geometric Mean LOS (GMLOS) (days):   Days to GMLOS:        OBJECTIVE:        Current admission status: Inpatient  Preferred Pharmacy:   [address] [place] [address]  Phone: [phone] Fax: [phone]    Primary Care Provider: Dixie Blake MD    Primary Insurance: [id number]  Secondary Insurance:     PROGRESS NOTE:  Desmond Fonseca from [facility] asked for updated clinicals  CM attached updated notes via Kendy Farmer for [facility]

## 2022-08-03 VITALS
OXYGEN SATURATION: 95 % | TEMPERATURE: 98 F | HEART RATE: 95 BPM | HEIGHT: 71 IN | DIASTOLIC BLOOD PRESSURE: 61 MMHG | WEIGHT: 315 LBS | BODY MASS INDEX: 44.1 KG/M2 | SYSTOLIC BLOOD PRESSURE: 124 MMHG | RESPIRATION RATE: 18 BRPM

## 2022-08-03 PROCEDURE — 99024 POSTOP FOLLOW-UP VISIT: CPT | Performed by: SURGERY

## 2022-08-03 PROCEDURE — 99239 HOSP IP/OBS DSCHRG MGMT >30: CPT | Performed by: PHYSICIAN ASSISTANT

## 2022-08-03 RX ORDER — SUCRALFATE 1 G/1
1 TABLET ORAL
Qty: 120 TABLET | Refills: 0 | Status: SHIPPED
Start: 2022-08-03 | End: 2022-10-18

## 2022-08-03 RX ORDER — PANTOPRAZOLE SODIUM 40 MG/1
40 TABLET, DELAYED RELEASE ORAL 2 TIMES DAILY
Qty: 60 TABLET | Refills: 0 | Status: SHIPPED
Start: 2022-08-03 | End: 2022-10-18

## 2022-08-03 RX ORDER — METOCLOPRAMIDE 10 MG/1
10 TABLET ORAL
Qty: 90 TABLET | Refills: 0 | Status: SHIPPED
Start: 2022-08-03 | End: 2022-09-12 | Stop reason: ALTCHOICE

## 2022-08-03 RX ORDER — ONDANSETRON 4 MG/1
4 TABLET, ORALLY DISINTEGRATING ORAL EVERY 6 HOURS PRN
Qty: 20 TABLET | Refills: 0 | Status: SHIPPED
Start: 2022-08-03 | End: 2022-09-12 | Stop reason: ALTCHOICE

## 2022-08-03 RX ADMIN — POLYETHYLENE GLYCOL 3350 17 G: 17 POWDER, FOR SOLUTION ORAL at 10:13

## 2022-08-03 RX ADMIN — SPIRONOLACTONE 50 MG: 25 TABLET ORAL at 10:13

## 2022-08-03 RX ADMIN — SODIUM CHLORIDE 2 G: 1 TABLET ORAL at 10:12

## 2022-08-03 RX ADMIN — DOCUSATE SODIUM 100 MG: 100 CAPSULE, LIQUID FILLED ORAL at 10:13

## 2022-08-03 RX ADMIN — POTASSIUM CHLORIDE 20 MEQ: 1500 TABLET, EXTENDED RELEASE ORAL at 10:13

## 2022-08-03 RX ADMIN — METHOCARBAMOL 750 MG: 750 TABLET ORAL at 06:02

## 2022-08-03 RX ADMIN — LEVOTHYROXINE SODIUM 25 MCG: 25 TABLET ORAL at 06:02

## 2022-08-03 RX ADMIN — BUPROPION HYDROCHLORIDE 150 MG: 150 TABLET, FILM COATED, EXTENDED RELEASE ORAL at 10:13

## 2022-08-03 RX ADMIN — DIGOXIN 250 MCG: 0.25 INJECTION INTRAMUSCULAR; INTRAVENOUS at 10:20

## 2022-08-03 RX ADMIN — GABAPENTIN 300 MG: 300 CAPSULE ORAL at 10:13

## 2022-08-03 RX ADMIN — SUCRALFATE 1 G: 1 TABLET ORAL at 06:02

## 2022-08-03 RX ADMIN — METOCLOPRAMIDE HYDROCHLORIDE 10 MG: 5 INJECTION INTRAMUSCULAR; INTRAVENOUS at 06:02

## 2022-08-03 RX ADMIN — DABIGATRAN ETEXILATE MESYLATE 150 MG: 150 CAPSULE ORAL at 10:12

## 2022-08-03 RX ADMIN — BUDESONIDE AND FORMOTEROL FUMARATE DIHYDRATE 2 PUFF: 160; 4.5 AEROSOL RESPIRATORY (INHALATION) at 10:13

## 2022-08-03 RX ADMIN — TORSEMIDE 40 MG: 20 TABLET ORAL at 10:12

## 2022-08-03 RX ADMIN — ACETAMINOPHEN 975 MG: 325 TABLET ORAL at 06:02

## 2022-08-03 NOTE — ASSESSMENT & PLAN NOTE
· Presents with intractable abdominal pain and nausea with vomiting  Has been refusing to eat and nauseous and dry heaving  · No discrete evidence of acute abdominal pathology on CT scan, would continue with conservative management  · Appears to be improving today in regards to appetite   Without vomiting today   · Stable for discharge   · Continue antiemetics PO outpatient   · Continue PPI and Carafate   · Continue lo-fat, low residue diet

## 2022-08-03 NOTE — ASSESSMENT & PLAN NOTE
Wt Readings from Last 3 Encounters:   07/30/22 (!) 231 kg (510 lb)   07/23/22 (!) 231 kg (510 lb)   07/19/22 (!) 228 kg (503 lb)     · Initially his torsemide was held secondary to poor p o  Intake    · Resume at discharge

## 2022-08-03 NOTE — ASSESSMENT & PLAN NOTE
· Had lengthy hospitalization in June of 2022 for SBO  · No evidence of SBO on CT scan  · Surgery evaluated, no inpatient surgical interventions anticipated besides medical management    · Appreciated input

## 2022-08-03 NOTE — ASSESSMENT & PLAN NOTE
· Rate controlled   · Continue digoxin and restart Pradaxa  · Restarted PO Metoprolol at home dose 8/1

## 2022-08-03 NOTE — DISCHARGE SUMMARY
[facility]  Discharge- Candy Walls 1970, 47 y o  male MRN: [id number]  Unit/Bed#: W -76 Encounter: [id number]  Primary Care Provider: Shayla Menendez MD   Date and time admitted to hospital: 7/26/2022 10:55 PM    * Intractable abdominal pain  Assessment & Plan  · Presents with intractable abdominal pain and nausea with vomiting  Has been refusing to eat and nauseous and dry heaving  · No discrete evidence of acute abdominal pathology on CT scan, would continue with conservative management  · Appears to be improving today in regards to appetite  Without vomiting today   · Stable for discharge   · Continue antiemetics PO outpatient   · Continue PPI and Carafate   · Continue lo-fat, low residue diet    S/p small bowel obstruction  Assessment & Plan  · Had lengthy hospitalization in June of 2022 for SBO  · No evidence of SBO on CT scan  · Surgery evaluated, no inpatient surgical interventions anticipated besides medical management  · Appreciated input     Chronic respiratory failure with hypoxia (HCC)  Assessment & Plan  · Chronically on 2-4 L secondary to CHF, morbid obesity and hypoventilation syndrome  · Monitor O2 status, continue current O2 level     Morbid obesity (HCC)  Assessment & Plan  · Body mass index is 71 13 kg/m²  · Will need some dietary modifications as exercise will be difficult for this patient    Chronic diastolic heart failure [facility])  Assessment & Plan  Wt Readings from Last 3 Encounters:   07/30/22 (!) 231 kg (510 lb)   07/23/22 (!) 231 kg (510 lb)   07/19/22 (!) 228 kg (503 lb)     · Initially his torsemide was held secondary to poor p o  Intake  · Resume at discharge    Atrial fibrillation [facility])  Assessment & Plan  · Rate controlled   · Continue digoxin and restart Pradaxa  · Restarted PO Metoprolol at home dose 8/1      QT prolongation  Assessment & Plan  · History of QT prolongation noted  · He is on both Reglan and Phenergan      History of DVT (deep vein thrombosis)  Assessment & Plan  · Patient appears to be tolerating solids better  · Restarted Pradaxa      Medical Problems             Resolved Problems  Date Reviewed: 8/3/2022          Resolved    Hypokalemia 8/1/2022     Resolved by  Talia Diaz PA-C              Discharging Physician / Practitioner: Talia Diaz PA-C  PCP: Marshal Johnson MD  Admission Date:   Admission Orders (From admission, onward)     Ordered        07/29/22 1354  Inpatient Admission  Once            07/27/22 1142  Place in Observation  Once                      Discharge Date: 08/03/22    Consultations During Hospital Stay:  · General Surgery - Dr Catalina Hatfield   · Gastroenterology - Dr Henry Rajan    Procedures Performed:   · CXR  · CT abdomen pelvis with contrast     Significant Findings / Test Results:   · CXR: No significant interval change since prior exams  Pulmonary vascular congestion without definite new local airspace consolidation   · CT abdomen pelvis with contrast: Technically limited study  Postoperative change as noted with large ventral abdominal wall hernia containing loops of large and small bowel without evidence for strangulation or obstruction  Ventral abdominal wall wound appears dehiscent with presumed Abthera in place and findings consistent with known enterocutaneous fistula  Incidental Findings:   · None     Test Results Pending at Discharge (will require follow up): · None     Outpatient Tests Requested:  · None    Complications:  None    Reason for Admission: Abdominal pain, nausea, and vomiting    Hospital Course:   Lenin Giraldo is a 47 y o  male patient who originally presented to the hospital on 7/26/2022 due to abdominal pain, nausea, and vomiting  CT scan in the ER did not show signs of bowel strangulation or obstruction  He was admitted for further monitoring, IV hydration, and pain control  General Surgery and GI was consulted   General Surgery followed along the case, however no inpatient surgery was needed  GI recommended supportive care with standing antiemetics as well as PRN and slowly advancing his diet  With this, his symptoms slowly improved and he was able to maintain adequate nutrition and electrolyte support by mouth  Some of his medications were made IV so as to insure the patient was getting them, but these were changed back to PO at discharge  He will return to [facility] [address]  Patient did require prolonged hospital stay due to necessity of re-acquiring authorization from insurance company  Please refer to medical chart for further details  Please see above list of diagnoses and related plan for additional information  Condition at Discharge: stable    Discharge Day Visit / Exam:   Subjective:  Patient reports he was nauseous overnight, but did not vomit  This has resolved and he is eating breakfast now  Vitals: Blood Pressure: 124/61 (08/03/22 0757)  Pulse: 95 (08/03/22 0757)  Temperature: 98 °F (36 7 °C) (08/03/22 0757)  Temp Source: Oral (08/03/22 0757)  Respirations: 18 (08/03/22 0757)  Height: 5' 11" (180 3 cm) (07/30/22 1500)  Weight - Scale: (!) 231 kg (510 lb) (07/30/22 1500)  SpO2: 95 % (08/03/22 0757)  Exam:   Physical Exam  Constitutional:       General: He is not in acute distress  Appearance: Normal appearance  He is morbidly obese  He is not ill-appearing or diaphoretic  Interventions: Nasal cannula in place  HENT:      Head: Normocephalic and atraumatic  Mouth/Throat:      Mouth: Mucous membranes are moist    Eyes:      General: No scleral icterus  Pupils: Pupils are equal, round, and reactive to light  Cardiovascular:      Rate and Rhythm: Normal rate and regular rhythm  Pulses: Normal pulses  Heart sounds: Normal heart sounds, S1 normal and S2 normal  No murmur heard  No systolic murmur is present  No diastolic murmur is present  No gallop  No S3 or S4 sounds     Pulmonary:      Effort: Pulmonary effort is normal  No accessory muscle usage or respiratory distress  Breath sounds: Normal breath sounds  No stridor  No decreased breath sounds, wheezing, rhonchi or rales  Chest:      Chest wall: No tenderness  Abdominal:      General: Bowel sounds are normal  There is no distension  Palpations: Abdomen is soft  Tenderness: There is no abdominal tenderness  There is no guarding  Musculoskeletal:      Right lower leg: No edema  Left lower leg: No edema  Skin:     General: Skin is warm and dry  Coloration: Skin is not jaundiced  Neurological:      General: No focal deficit present  Mental Status: He is alert  Mental status is at baseline  Motor: No tremor or seizure activity  Psychiatric:         Behavior: Behavior is cooperative  Discussion with Family: Patient declined call to   Discharge instructions/Information to patient and family:   See after visit summary for information provided to patient and family  Provisions for Follow-Up Care:  See after visit summary for information related to follow-up care and any pertinent home health orders  Disposition:   [address]     Planned Readmission: None     Discharge Statement:  I spent 45 minutes discharging the patient  This time was spent on the day of discharge  I had direct contact with the patient on the day of discharge  Greater than 50% of the total time was spent examining patient, answering all patient questions, arranging and discussing plan of care with patient as well as directly providing post-discharge instructions  Additional time then spent on discharge activities  Discharge Medications:  See after visit summary for reconciled discharge medications provided to patient and/or family        **Please Note: This note may have been constructed using a voice recognition system**

## 2022-08-03 NOTE — CASE MANAGEMENT
Case Management Progress Note    Patient name Graciela Kuhn  Location W /W -09 MRN [id number]  : 1970 Date 8/3/2022       LOS (days): 5  Geometric Mean LOS (GMLOS) (days):   Days to [facility]:        OBJECTIVE:        Current admission status: Inpatient  Preferred Pharmacy:   [address] [place] [address]  Phone: [phone] Fax: [phone]    Primary Care Provider: Rina Cuadra MD    Primary Insurance: [address]  Secondary Insurance:     PROGRESS NOTE:          Weekly Care Management Length of Stay Review     Current LOS: 5 Days    Most Recent Labs:     Lab Results   Component Value Date/Time    SODIUM 135 2022 08:09 AM    K 3 5 2022 08:09 AM    CL 90 (L) 2022 08:09 AM    CO2 41 (H) 2022 08:09 AM    BUN 5 2022 08:09 AM    CREATININE 0 47 (L) 2022 08:09 AM    GLUC 87 2022 08:09 AM    ALKPHOS 115 (H) 2022 08:09 AM    ALT 12 2022 08:09 AM    AST 42 (H) 2022 08:09 AM    ALB 2 3 (L) 2022 08:09 AM    TBILI 0 82 2022 08:09 AM       Most Recent Vitals:   Vitals:    22 0757   BP: 124/61   Pulse: 95   Resp: 18   Temp: 98 °F (36 7 °C)   SpO2: 95%        Identified Barriers to Discharge/Discharge Goals/Care Management Interventions: n/v, advanced diet,     Intended Discharge Disposition: Return to [facility] at 11am    Expected Discharge Date: Today

## 2022-08-03 NOTE — CASE MANAGEMENT
Case Management Assessment & Discharge Planning Note    Patient name Harmony Costa W /W -41 MRN [id number]  : 1970 Date 8/3/2022       Current Admission Date: 2022  Current Admission Diagnosis:Intractable abdominal pain   Patient Active Problem List    Diagnosis Date Noted    Hyponatremia 2022    Acute respiratory failure ([address] ) 2022    Hypertension 2022    Diabetes mellitus ([address] ) 2022    Depression 2022    Intractable abdominal pain 2022    Epidermoid cyst of neck 2022    Hypotension 2021    Chronic respiratory failure with hypoxia ([address] ) 2021    Cellulitis of multiple sites of head and neck 2021    Elevated troponin 2021    History of DVT (deep vein thrombosis) 2021    Positive blood culture 2021    QT prolongation 2021    COPD (chronic obstructive pulmonary disease) ([address] ) 05/10/2021    Hypothyroidism 05/10/2021    Medical non-compliance 2017    Atrial fibrillation ([address] ) 2017    Foot pain 11/15/2016    Knee pain 11/15/2016    S/p small bowel obstruction 2016    Recurrent bronchospasm 10/16/2016    Venous stasis dermatitis of both lower extremities 10/16/2016    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    Obesity hypoventilation syndrome ([address] ) 2016    Chronic diastolic heart failure (HCC) 2016    MRSA (methicillin resistant Staphylococcus aureus) Tracheobronchitis 2016    Atrial fibrillation with rapid ventricular response ([address] ) 08/15/2016    Morbid obesity ([address] ) 08/15/2016    Tremor 08/15/2016      LOS (days): 5  Geometric Mean LOS (GMLOS) (days):   Days to GMLOS:     OBJECTIVE:  PATIENT READMITTED TO HOSPITAL  Risk of Unplanned Readmission Score: 59 93         Current admission status: Inpatient       Preferred Pharmacy:   [address] [place] [address], [address] [address]  Phone: [phone] Fax: [phone]    Primary Care Provider: Erin Ureña MD    Primary Insurance: [id number]  Secondary Insurance:     ASSESSMENT:  [address], [address] Other   Primary Phone: [phone] (Mobile)  Home Phone: [phone] DETAILS:                                                          Transport at Discharge : [facility]  Dispatcher Contacted: Yes  Number/Name of Dispatcher: [facility]  Transported by [employer] and Unit #): [facility]  ETA of Transport (Date): 08/03/22  ETA of Transport (Time): 1100                                          CM notified JIMENA, RN, pt, and facility of transportation time  [address] admissions representative, Selina Curiel, says they have the authorization for pt's return and are expecting them per [address]

## 2022-08-04 NOTE — UTILIZATION REVIEW
Notification of Discharge   This is a Notification of Discharge from our facility [address]  Please be advised that this patient has been discharge from our facility  Below you will find the admission and discharge date and time including the patients disposition  UTILIZATION REVIEW CONTACT:  Jackson Liang MA  Utilization   Network Utilization Review Department  Phone: [phone] x carefully listen to the prompts  All voicemails are confidential   Email: [email or web address]     PHYSICIAN ADVISORY SERVICES:  FOR [facility] REVIEW - MEDICAL NECESSITY DENIAL  Phone: [phone]  Fax: [phone]  Email: [email or web address]     PRESENTATION DATE: 7/26/2022 10:55 PM  OBERVATION ADMISSION DATE:   INPATIENT ADMISSION DATE: 7/29/22  1:54 PM   DISCHARGE DATE: 8/3/2022 11:30 AM  DISPOSITION: Non [facility] SN[facility]/S[facility] Non [facility] SNF/[facility]/SNU      IMPORTANT INFORMATION:  Send all requests for admission clinical reviews, approved or denied determinations and any other requests to dedicated fax number below belonging to the campus where the patient is receiving treatment   List of dedicated fax numbers:  [address] DENIALS (Administrative/Medical Necessity) [phone]   [address]  (Maternity/NICU/Pediatrics) [phone]   [facility] [phone]   [address] [facility] [phone]   [address] [place] [address] [phone]   [address] [phone]   [facility]  [phone]   [address] [facility]  [address] [facility] [address] [facility] [phone]

## 2022-09-09 ENCOUNTER — HOSPITAL ENCOUNTER (OUTPATIENT)
Facility: HOSPITAL | Age: 52
Setting detail: OBSERVATION
Discharge: NURSING, REHAB OR LONG-TERM CARE | End: 2022-09-10
Attending: EMERGENCY MEDICINE | Admitting: INTERNAL MEDICINE
Payer: COMMERCIAL

## 2022-09-09 ENCOUNTER — NURSING HOME VISIT (OUTPATIENT)
Dept: GERIATRICS | Facility: OTHER | Age: 52
End: 2022-09-09

## 2022-09-09 DIAGNOSIS — E83.42 HYPOMAGNESEMIA: Primary | ICD-10-CM

## 2022-09-09 DIAGNOSIS — E87.1 HYPONATREMIA: ICD-10-CM

## 2022-09-09 DIAGNOSIS — J44.9 COPD (CHRONIC OBSTRUCTIVE PULMONARY DISEASE) (HCC): ICD-10-CM

## 2022-09-09 DIAGNOSIS — K63.2 ENTEROCUTANEOUS FISTULA: Primary | ICD-10-CM

## 2022-09-09 DIAGNOSIS — D64.9 ANEMIA: ICD-10-CM

## 2022-09-09 LAB
2HR DELTA HS TROPONIN: 1 NG/L
ALBUMIN SERPL BCP-MCNC: 2.8 G/DL (ref 3.5–5)
ALP SERPL-CCNC: 141 U/L (ref 34–104)
ALT SERPL W P-5'-P-CCNC: 26 U/L (ref 7–52)
AST SERPL W P-5'-P-CCNC: 73 U/L (ref 13–39)
ATRIAL RATE: 86 BPM
BASOPHILS # BLD AUTO: 0.02 THOUSANDS/ΜL (ref 0–0.1)
BASOPHILS NFR BLD AUTO: 1 % (ref 0–1)
BILIRUB SERPL-MCNC: 2.4 MG/DL (ref 0.2–1)
BUN SERPL-MCNC: 12 MG/DL (ref 5–25)
CALCIUM ALBUM COR SERPL-MCNC: 9.9 MG/DL (ref 8.3–10.1)
CALCIUM SERPL-MCNC: 8.9 MG/DL (ref 8.4–10.2)
CARDIAC TROPONIN I PNL SERPL HS: 5 NG/L
CARDIAC TROPONIN I PNL SERPL HS: 6 NG/L
CHLORIDE SERPL-SCNC: 76 MMOL/L (ref 96–108)
CO2 SERPL-SCNC: >45 MMOL/L (ref 21–32)
CREAT SERPL-MCNC: 0.59 MG/DL (ref 0.6–1.3)
EOSINOPHIL # BLD AUTO: 0 THOUSAND/ΜL (ref 0–0.61)
EOSINOPHIL NFR BLD AUTO: 0 % (ref 0–6)
ERYTHROCYTE [DISTWIDTH] IN BLOOD BY AUTOMATED COUNT: 17.2 % (ref 11.6–15.1)
GFR SERPL CREATININE-BSD FRML MDRD: 117 ML/MIN/1.73SQ M
GLUCOSE SERPL-MCNC: 113 MG/DL (ref 65–140)
HCT VFR BLD AUTO: 36 % (ref 36.5–49.3)
HGB BLD-MCNC: 10.8 G/DL (ref 12–17)
IMM GRANULOCYTES # BLD AUTO: 0.01 THOUSAND/UL (ref 0–0.2)
IMM GRANULOCYTES NFR BLD AUTO: 0 % (ref 0–2)
LYMPHOCYTES # BLD AUTO: 1.26 THOUSANDS/ΜL (ref 0.6–4.47)
LYMPHOCYTES NFR BLD AUTO: 32 % (ref 14–44)
MAGNESIUM SERPL-MCNC: 1.6 MG/DL (ref 1.9–2.7)
MCH RBC QN AUTO: 29 PG (ref 26.8–34.3)
MCHC RBC AUTO-ENTMCNC: 30 G/DL (ref 31.4–37.4)
MCV RBC AUTO: 97 FL (ref 82–98)
MONOCYTES # BLD AUTO: 0.39 THOUSAND/ΜL (ref 0.17–1.22)
MONOCYTES NFR BLD AUTO: 10 % (ref 4–12)
NEUTROPHILS # BLD AUTO: 2.29 THOUSANDS/ΜL (ref 1.85–7.62)
NEUTS SEG NFR BLD AUTO: 57 % (ref 43–75)
NRBC BLD AUTO-RTO: 0 /100 WBCS
PLATELET # BLD AUTO: 175 THOUSANDS/UL (ref 149–390)
PMV BLD AUTO: 11 FL (ref 8.9–12.7)
POTASSIUM SERPL-SCNC: 3.6 MMOL/L (ref 3.5–5.3)
PROT SERPL-MCNC: 7.6 G/DL (ref 6.4–8.4)
QRS AXIS: 9 DEGREES
QRSD INTERVAL: 96 MS
QT INTERVAL: 384 MS
QTC INTERVAL: 456 MS
RBC # BLD AUTO: 3.73 MILLION/UL (ref 3.88–5.62)
SODIUM SERPL-SCNC: 132 MMOL/L (ref 135–147)
T WAVE AXIS: 233 DEGREES
VENTRICULAR RATE: 85 BPM
WBC # BLD AUTO: 3.97 THOUSAND/UL (ref 4.31–10.16)

## 2022-09-09 PROCEDURE — 99024 POSTOP FOLLOW-UP VISIT: CPT | Performed by: SURGERY

## 2022-09-09 PROCEDURE — 93005 ELECTROCARDIOGRAM TRACING: CPT

## 2022-09-09 PROCEDURE — 99285 EMERGENCY DEPT VISIT HI MDM: CPT | Performed by: EMERGENCY MEDICINE

## 2022-09-09 PROCEDURE — 84484 ASSAY OF TROPONIN QUANT: CPT | Performed by: EMERGENCY MEDICINE

## 2022-09-09 PROCEDURE — 85025 COMPLETE CBC W/AUTO DIFF WBC: CPT | Performed by: EMERGENCY MEDICINE

## 2022-09-09 PROCEDURE — 99285 EMERGENCY DEPT VISIT HI MDM: CPT

## 2022-09-09 PROCEDURE — 96366 THER/PROPH/DIAG IV INF ADDON: CPT

## 2022-09-09 PROCEDURE — 80053 COMPREHEN METABOLIC PANEL: CPT | Performed by: EMERGENCY MEDICINE

## 2022-09-09 PROCEDURE — 93010 ELECTROCARDIOGRAM REPORT: CPT | Performed by: INTERNAL MEDICINE

## 2022-09-09 PROCEDURE — 96365 THER/PROPH/DIAG IV INF INIT: CPT

## 2022-09-09 PROCEDURE — 83735 ASSAY OF MAGNESIUM: CPT | Performed by: EMERGENCY MEDICINE

## 2022-09-09 PROCEDURE — 36415 COLL VENOUS BLD VENIPUNCTURE: CPT | Performed by: EMERGENCY MEDICINE

## 2022-09-09 RX ORDER — MAGNESIUM SULFATE HEPTAHYDRATE 40 MG/ML
2 INJECTION, SOLUTION INTRAVENOUS ONCE
Status: COMPLETED | OUTPATIENT
Start: 2022-09-09 | End: 2022-09-09

## 2022-09-09 RX ADMIN — SODIUM CHLORIDE 500 ML: 0.9 INJECTION, SOLUTION INTRAVENOUS at 17:32

## 2022-09-09 RX ADMIN — MAGNESIUM SULFATE HEPTAHYDRATE 2 G: 40 INJECTION, SOLUTION INTRAVENOUS at 17:33

## 2022-09-09 NOTE — ED PROVIDER NOTES
History  Chief Complaint   Patient presents with    Abnormal Lab     Pt sent in for low potassium level  Pt has no complaints at present  Patient is a 72-year-old male with a history of SBO and subsequent enterocutaneous fistula who presents with low potassium  Patient had blood work today, which revealed a potassium of 2 8  He was sent to the emergency department for evaluation  Patient admits to having fatigue and lightheadedness  He also admits to abdominal pain, which is chronic  He was previously on torsemide, spironolactone and potassium chloride  However these medications are no longer on his med reconciliation form from the facility  Patient denies chest pain or shortness of breath  He is on oxygen around the clock, between 2 and 3 L  History provided by:  Patient  Medical Problem  Quality:  Hypokalemia  Chronicity:  New  Associated symptoms: abdominal pain (chronic), cough and shortness of breath (chronic)    Associated symptoms: no chest pain, no diarrhea, no fever, no headaches, no nausea, no rash, no sore throat and no vomiting        Prior to Admission Medications   Prescriptions Last Dose Informant Patient Reported? Taking?    Cholecalciferol (VITAMIN D) 50 MCG (2000 UT) tablet  Outside Facility (Specify) Yes No   Sig: Take 2,000 Units by mouth daily   Potassium Chloride (KCL-20 PO)   Yes No   Sig: Take 20 mg by mouth in the morning   QUEtiapine (SEROquel) 50 mg tablet   No No   Sig: Take 1 tablet (50 mg total) by mouth daily at bedtime   acetaminophen (TYLENOL) 325 mg tablet   No No   Sig: Take 3 tablets (975 mg total) by mouth every 8 (eight) hours   aluminum-magnesium hydroxide-simethicone (MYLANTA) 200-200-20 mg/5 mL suspension   No No   Sig: Take 30 mL by mouth every 6 (six) hours as needed for indigestion or heartburn   ammonium lactate (LAC-HYDRIN) 12 % cream   No No   Sig: Apply topically 2 (two) times a day   buPROPion (FORFIVO XL) 450 MG 24 hr tablet   No No   Sig: Take 1 tablet (450 mg total) by mouth daily   budesonide-formoterol (SYMBICORT) 160-4 5 mcg/act inhaler   Yes No   Sig: Inhale 2 puffs 2 (two) times a day Rinse mouth after use     cyanocobalamin (VITAMIN B-12) 1000 MCG tablet  Outside Facility (Specify) Yes No   Sig: Take 1,000 mcg by mouth daily   dabigatran etexilate (PRADAXA) 150 mg capsu  Outside Facility (Specify) Yes No   Sig: Take 150 mg by mouth 2 (two) times a day   dicyclomine (BENTYL) 20 mg tablet   No No   Sig: Take 1 tablet (20 mg total) by mouth 4 (four) times a day (before meals and at bedtime)   digoxin (LANOXIN) 0 25 mg tablet   No No   Sig: Take 1 tablet (250 mcg total) by mouth daily   docusate sodium (COLACE) 100 mg capsule   No No   Sig: Take 1 capsule (100 mg total) by mouth 2 (two) times a day   famotidine (PEPCID) 20 mg tablet   No No   Sig: Take 1 tablet (20 mg total) by mouth daily   gabapentin (NEURONTIN) 250 mg/5 mL solution   No No   Sig: Take 6 mL (300 mg total) by mouth 3 (three) times a day   ipratropium-albuterol (DUO-NEB) 0 5-2 5 mg/3 mL nebulizer solution  Outside Facility (Specify) Yes No   Sig: Take 3 mL by nebulization 3 (three) times a day   levothyroxine 25 mcg tablet  Outside Facility (Specify) Yes No   Sig: Take 25 mcg by mouth daily   loratadine (CLARITIN) 10 mg tablet   Yes No   Sig: Take 10 mg by mouth daily   methocarbamol (ROBAXIN) 750 mg tablet   No No   Sig: Take 1 tablet (750 mg total) by mouth every 6 (six) hours   metoclopramide (Reglan) 10 mg tablet   No No   Sig: Take 1 tablet (10 mg total) by mouth 3 (three) times a day before meals   metoprolol tartrate (LOPRESSOR) 25 mg tablet   No No   Sig: Take 1 tablet (25 mg total) by mouth every 8 (eight) hours   nystatin (MYCOSTATIN) cream   No No   Sig: Apply topically 2 (two) times a day   Patient not taking: Reported on 6/6/2022   ondansetron (Zofran ODT) 4 mg disintegrating tablet   No No   Sig: Take 1 tablet (4 mg total) by mouth every 6 (six) hours as needed for nausea or vomiting   pantoprazole (PROTONIX) 40 mg tablet   No No   Sig: Take 1 tablet (40 mg total) by mouth 2 (two) times a day   polyethylene glycol (MIRALAX) 17 g packet   No No   Sig: Take 17 g by mouth daily   sodium chloride (OCEAN) 0 65 % nasal spray   No No   Si spray into each nostril as needed for congestion   sodium chloride 1 g tablet   No No   Sig: Take 2 tablets (2 g total) by mouth 3 (three) times a day   spironolactone (ALDACTONE) 50 mg tablet  Outside Facility (Specify) Yes No   Sig: Take 50 mg by mouth daily   sucralfate (CARAFATE) 1 g tablet   No No   Sig: Take 1 tablet (1 g total) by mouth 4 (four) times a day (before meals and at bedtime)   torsemide 40 MG TABS   No No   Sig: Take 40 mg by mouth 2 (two) times a day      Facility-Administered Medications: None       Past Medical History:   Diagnosis Date    Afib ([facility] )     Anemia     Anxiety     Cancer ([place] [address] )     Cardiac disease     Cellulitis     COPD (chronic obstructive pulmonary disease) ([place] [address] )     Depression     Diabetes mellitus ([place] [address] )     DVT (deep venous thrombosis) (HCC)     GERD (gastroesophageal reflux disease)     HBP (high blood pressure)     Heart failure (HCC)     Hx of blood clots     Hypertension     Hypokalemia     Hypothyroid     Obesity     Psychiatric disorder        Past Surgical History:   Procedure Laterality Date    ABDOMINAL HERNIA REPAIR  2019    CHOLECYSTECTOMY      Lap    GASTRECTOMY SLEEVE LAPAROSCOPIC  2018    INCISION AND DRAINAGE OF WOUND Bilateral 2021    Procedure: INCISION AND DRAINAGE (I&D) HEAD/FACE;  Surgeon: Ayaka Genao MD;  Location:  MAIN OR;  Service: General    IR PICC PLACEMENT DOUBLE LUMEN  2022    IVC FILTER INSERTION  2018    KNEE SURGERY Right     open wound, injury     LAPAROTOMY N/A 2022    Procedure: LAPAROTOMY EXPLORATORY, EXTENSIVE LYSIS OF ADHESIONS, APPLICATION OF ABTHERA WOUND VAC;  Surgeon: Chyna Duncan MD;  Location: [facility];  Service: General    LAPAROTOMY N/A 6/15/2022    Procedure: LAPAROTOMY EXPLORATORY WITH REPAIR OF SEROSAL INJURY;  Surgeon: Verner Hatchet, MD;  Location: [facility];  Service: General    LEG SURGERY Right 2009    SPLIT THICKNESS SKIN GRAFT N/A 6/30/2022    Procedure: incision and drainage, wash out vac change;  Surgeon: Laisha Ewing DO;  [facility];  Service: General    SPLIT THICKNESS SKIN GRAFT N/A 7/5/2022    Procedure: PREPERATION RECIPIENT STSG SITE, DEBRIDEMENT NONVIABLE SKIN EDGE; PREPERATION RECIPIENT SKIN GRAFT SITE; APPLICATION OF SKIN SUBSTITUTE TO DONOR SITE; APPLICATION WOUND VAC X2 GREATER THAN 50CM; Surgeon: Kerry Arroyo DO;  [facility];  Service: General    US GUIDED VASCULAR ACCESS  08/06/2018    VAC DRESSING APPLICATION N/A 0/85/9121    Procedure: CHANGE DRESSING/VAC ABDOMEN;  Surgeon: Verner Hatchet, MD;  [facility];  Service: General    [address] [place] [address] Road N/A 7/35/0504    Procedure: ABDOMINAL WASHOUT, REPAIR OF SEROSAL TEARS,BRIDING VICRYL MESH, PARTIAL CLOSURE, APPLICATION OF WOUND VAC;  Surgeon: Laisha Ewing DO;  [facility];  Service: General    VAC DRESSING APPLICATION N/A 8/36/3580    Procedure: CHANGE DRESSING/VAC ABDOMEN;  Surgeon: Laisha Ewing DO;  [facility];  Service: General       Family History   Problem Relation Age of Onset    Lung cancer Father     Diabetes Maternal Aunt     Heart attack Mother     Clotting disorder Mother     Hypertension Mother     Heart failure Mother     Diabetes Mother     Atrial fibrillation Mother     Sleep apnea Mother     Obesity Mother     Asthma Sister     Stroke Neg Hx     Anuerysm Neg Hx     Arrhythmia Neg Hx     Hyperlipidemia Neg Hx         pt unsure    Fainting Neg Hx      I have reviewed and agree with the history as documented      E-Cigarette/Vaping    E-Cigarette Use Never User      E-Cigarette/Vaping Substances    Nicotine No  THC No     CBD No     Flavoring No     Other No     Unknown No      Social History     Tobacco Use    Smoking status: Former Smoker     Packs/day: 1 00     Years: 15 00     Pack years: 15 00    Smokeless tobacco: Never Used    Tobacco comment: 1 5ppd x 23 years, quit 2014   Vaping Use    Vaping Use: Never used   Substance Use Topics    Alcohol use: Not Currently    Drug use: No       Review of Systems   Constitutional: Negative for chills, diaphoresis and fever  HENT: Negative for nosebleeds, sore throat and trouble swallowing  Eyes: Negative for photophobia, pain and visual disturbance  Respiratory: Positive for cough and shortness of breath (chronic)  Negative for chest tightness  Cardiovascular: Negative for chest pain, palpitations and leg swelling  Gastrointestinal: Positive for abdominal pain (chronic)  Negative for constipation, diarrhea, nausea and vomiting  Endocrine: Negative for polydipsia and polyuria  Genitourinary: Negative for difficulty urinating, dysuria and hematuria  Musculoskeletal: Negative for back pain, neck pain and neck stiffness  Skin: Negative for pallor and rash  Neurological: Negative for dizziness, syncope, light-headedness and headaches  All other systems reviewed and are negative  Physical Exam  Physical Exam  Vitals and nursing note reviewed  Constitutional:       General: He is not in acute distress  Appearance: He is well-developed  HENT:      Head: Normocephalic and atraumatic  Eyes:      Pupils: Pupils are equal, round, and reactive to light  Cardiovascular:      Rate and Rhythm: Normal rate and regular rhythm  Pulses: Normal pulses  Heart sounds: Normal heart sounds  Pulmonary:      Effort: Pulmonary effort is normal  No respiratory distress  Breath sounds: Normal breath sounds  Abdominal:      Palpations: Abdomen is soft  Abdomen is not rigid  Tenderness: There is generalized abdominal tenderness  There is no guarding or rebound  Comments: Morbidly obese  Enterocutaneous fistula draining brownish liquid  Musculoskeletal:         General: No tenderness  Normal range of motion  Cervical back: Normal range of motion and neck supple  Lymphadenopathy:      Cervical: No cervical adenopathy  Skin:     General: Skin is warm and dry  Capillary Refill: Capillary refill takes less than 2 seconds  Neurological:      Mental Status: He is alert and oriented to person, place, and time  Cranial Nerves: No cranial nerve deficit  Sensory: No sensory deficit           Vital Signs  ED Triage Vitals [09/09/22 1513]   Temperature Pulse Respirations Blood Pressure SpO2   97 8 °F (36 6 °C) 94 20 130/63 99 %      Temp Source Heart Rate Source Patient Position - Orthostatic VS BP Location FiO2 (%)   Oral -- -- -- --      Pain Score       --           Vitals:    09/09/22 1513 09/09/22 1945   BP: 130/63 139/66   Pulse: 94 92         Visual Acuity      ED Medications  Medications   magnesium sulfate 2 g/50 mL IVPB (premix) 2 g (2 g Intravenous New Bag 9/9/22 1733)   sodium chloride 0 9 % bolus 500 mL (0 mL Intravenous Stopped 9/9/22 1940)       Diagnostic Studies  Results Reviewed     Procedure Component Value Units Date/Time    HS Troponin I 2hr [[id number]]  (Normal) Collected: 09/09/22 1941    Lab Status: Final result Specimen: Blood from Arm, Right Updated: 09/09/22 2024     hs TnI 2hr 6 ng/L      Delta 2hr hsTnI 1 ng/L     HS Troponin 0hr (reflex protocol) [[id number]]  (Normal) Collected: 09/09/22 1619    Lab Status: Final result Specimen: Blood from Arm, Left Updated: 09/09/22 1655     hs TnI 0hr 5 ng/L     Comprehensive metabolic panel [[id number]]  (Abnormal) Collected: 09/09/22 1619    Lab Status: Final result Specimen: Blood from Arm, Left Updated: 09/09/22 1654     Sodium 132 mmol/L      Potassium 3 6 mmol/L      Chloride 76 mmol/L      CO2 >45 mmol/L      ANION GAP --     BUN 12 mg/dL Creatinine 0 59 mg/dL      Glucose 113 mg/dL      Calcium 8 9 mg/dL      Corrected Calcium 9 9 mg/dL      AST 73 U/L      ALT 26 U/L      Alkaline Phosphatase 141 U/L      Total Protein 7 6 g/dL      Albumin 2 8 g/dL      Total Bilirubin 2 40 mg/dL      eGFR 117 ml/min/1 73sq m     Narrative:      National Kidney Disease Foundation guidelines for Chronic Kidney Disease (CKD):     Stage 1 with normal or high GFR (GFR > 90 mL/min/1 73 square meters)    Stage 2 Mild CKD (GFR = 60-89 mL/min/1 73 square meters)    Stage 3A Moderate CKD (GFR = 45-59 mL/min/1 73 square meters)    Stage 3B Moderate CKD (GFR = 30-44 mL/min/1 73 square meters)    Stage 4 Severe CKD (GFR = 15-29 mL/min/1 73 square meters)    Stage 5 End Stage CKD (GFR <15 mL/min/1 73 square meters)  Note: GFR calculation is accurate only with a steady state creatinine    Magnesium [[id number]]  (Abnormal) Collected: 09/09/22 1619    Lab Status: Final result Specimen: Blood from Arm, Left Updated: 09/09/22 1647     Magnesium 1 6 mg/dL     CBC and differential [[id number]]  (Abnormal) Collected: 09/09/22 1619    Lab Status: Final result Specimen: Blood from Arm, Left Updated: 09/09/22 1630     WBC 3 97 Thousand/uL      RBC 3 73 Million/uL      Hemoglobin 10 8 g/dL      Hematocrit 36 0 %      MCV 97 fL      MCH 29 0 pg      MCHC 30 0 g/dL      RDW 17 2 %      MPV 11 0 fL      Platelets 499 Thousands/uL      nRBC 0 /100 WBCs      Neutrophils Relative 57 %      Immat GRANS % 0 %      Lymphocytes Relative 32 %      Monocytes Relative 10 %      Eosinophils Relative 0 %      Basophils Relative 1 %      Neutrophils Absolute 2 29 Thousands/µL      Immature Grans Absolute 0 01 Thousand/uL      Lymphocytes Absolute 1 26 Thousands/µL      Monocytes Absolute 0 39 Thousand/µL      Eosinophils Absolute 0 00 Thousand/µL      Basophils Absolute 0 02 Thousands/µL                  No orders to display              Procedures  ECG 12 Lead Documentation Only    Date/Time: 9/9/2022 3:25 PM  Performed by: Perfecto Cabezas DO  Authorized by: Perfecto Cabezas DO     ECG reviewed by me, the ED Provider: yes    Patient location:  ED  Previous ECG:     Previous ECG:  Compared to current    Similarity:  No change    Comparison to cardiac monitor: Yes    Comments:      Atrial fibrillation with a rate of 84 beats per minute  Normal axis  Poor R-wave progression  Nonspecific ST T wave abnormalities  Baseline artifact  Similar to previous from 06/30/2022  ED Course  ED Course as of 09/09/22 2044   Fri Sep 09, 2022   1534 It appears the patient is no longer on a diuretic  I suspect his hypokalemia is secondary to the enterocutaneous fistula  1839 Patient is resting comfortably  Potassium is normal   We are replacing magnesium IV  Will check 2nd troponin  HEART Risk Score    Flowsheet Row Most Recent Value   Heart Score Risk Calculator    History 0 Filed at: 09/09/2022 2043   ECG 1 Filed at: 09/09/2022 2043   Age 1 Filed at: 09/09/2022 2043   Risk Factors 2 Filed at: 09/09/2022 2043   Troponin 0 Filed at: 09/09/2022 2043   HEART Score 4 Filed at: 09/09/2022 2043                                      MDM  Number of Diagnoses or Management Options  Anemia  COPD (chronic obstructive pulmonary disease) (HCC)  Hypomagnesemia: new and does not require workup  Hyponatremia: new and does not require workup  Diagnosis management comments: Patient with a history of enterocutaneous fistula presents due to abnormal labs  Patient had outpatient labs which revealed hypokalemia  However repeat labs in the ED reveal a normal potassium  However his magnesium is low  This was replaced IV  He has a chronic metabolic alkalosis, likely secondary to COPD and chronic respiratory acidosis  Patient did complain of vague chest pain earlier today  EKG reveals no clear ischemia  Delta troponin unremarkable  Patient is not having any chest pain at this time    He is stable for discharge and continued outpatient management electrolyte abnormalities  I feel that these are secondary to enterocutaneous fistula  Patient advised to return to ED if symptoms worsen  Portions of the above record have been created with voice recognition software   Occasional wrong word or "sound alike" substitutions may have occurred due to the inherent limitations of voice recognition software   Read the chart carefully and recognize, using context, where substitutions may have occurred  Amount and/or Complexity of Data Reviewed  Clinical lab tests: ordered and reviewed  Tests in the radiology section of CPT®: ordered and reviewed  Tests in the medicine section of CPT®: ordered and reviewed  Review and summarize past medical records: yes  Independent visualization of images, tracings, or specimens: yes    Risk of Complications, Morbidity, and/or Mortality  Presenting problems: high  Diagnostic procedures: moderate  Management options: moderate    Patient Progress  Patient progress: stable      Disposition  Final diagnoses:   Hypomagnesemia   COPD (chronic obstructive pulmonary disease) ([facility] )   Hyponatremia   Anemia     Time reflects when diagnosis was documented in both MDM as applicable and the Disposition within this note     Time User Action Codes Description Comment    9/9/2022  8:25 PM Scott Castro Add [E83 42] Hypomagnesemia     9/9/2022  8:25 PM Diana Lute L Add [J44 9] COPD (chronic obstructive pulmonary disease) ([facility] )     9/9/2022  8:26 PM Sandre Lute L Add [E87 1] Hyponatremia     9/9/2022  8:26 PM Scott Castro Add [D64 9] Anemia       ED Disposition     ED Disposition   Discharge    Condition   Stable    Date/Time   Fri Sep 9, 2022  8:25 PM    Comment   Cathi Garza discharge to home/self care                 Follow-up Information     Follow up With Specialties Details Why Contact Info    Alexandria Palmer MD Internal Medicine Schedule an appointment as soon as possible for a visit  Return to ED sooner if he develops increasing weakness or you develop chest pain, palpitations, shortness of breath, other concerns  [id number]  [id number]  [phone]            Patient's Medications   Discharge Prescriptions    No medications on file       No discharge procedures on file      PDMP Review       Value Time User    PDMP Reviewed  Yes 7/26/2022 11:02 PM Radha Corral MD          ED Provider  Electronically Signed by           Denzel Tenorio DO  09/09/22 2048

## 2022-09-10 VITALS
OXYGEN SATURATION: 95 % | HEART RATE: 95 BPM | WEIGHT: 315 LBS | HEIGHT: 70 IN | TEMPERATURE: 97.7 F | SYSTOLIC BLOOD PRESSURE: 125 MMHG | RESPIRATION RATE: 19 BRPM | BODY MASS INDEX: 45.1 KG/M2 | DIASTOLIC BLOOD PRESSURE: 66 MMHG

## 2022-09-10 NOTE — CASE MANAGEMENT
Case Management Progress Note    Patient name [facility]  Location W /W -36 MRN [id number]  : 1970 Date 9/10/2022       LOS (days): 0  Geometric Mean LOS (GMLOS) (days):   Days to GMLOS:        OBJECTIVE:        Current admission status: Observation  Preferred Pharmacy:   [address] [place] [address]  Phone: [phone] Fax: [phone]    Primary Care Provider: Jesse Hardwick MD    Primary Insurance: [email or web address]  Secondary Insurance:     PROGRESS NOTE:    CM called the Rachid Macdonald at [facility]: [phone] to notify Radha Escobar of pt's discharge back to facility  Radha Escobar confirmed pt is a bedhold and accepting pt back  Please notify Radha Escobar when transportation is confirmed

## 2022-09-10 NOTE — ED NOTES
Pt's chart accessed by request of MS RN who called stating pt was upstairs sleeping while awaiting transportation back to [facility] has requested d/c paperwork that RN did not have access to  This RN hilda chart to access paperwork and fax to [facility], as requested  However no fax number has been recorded in chart, therefore paperwork will not be faxed        Kristy Whyte RN  09/10/22 4791

## 2022-09-10 NOTE — ED NOTES
[facility] called for transport back to facility  Advised transport will not be available until day shift       Stevenson Rahman RN  09/09/22 2108       Stevenson Rahman RN  09/09/22 2108

## 2022-09-10 NOTE — ED NOTES
PT provided with a drink  Lights were dimmed, no other needs at this time        Stephanie Renteria [facility]  09/09/22 2012

## 2022-09-10 NOTE — CASE MANAGEMENT
Case Management Assessment & Discharge Planning Note    Patient name [facility] W /W -39 MRN [id number]  : 1970 Date 9/10/2022       Current Admission Date: 2022  Current Admission Diagnosis:COPD (chronic obstructive pulmonary disease) ([facility] ), Hypomagnesemia, Hyponatremia, Abdominal pain, Anemia   Patient Active Problem List    Diagnosis Date Noted    Hyponatremia 2022    Acute respiratory failure ([address] ) 2022    Hypertension 2022    Diabetes mellitus ([facility] ) 2022    Depression 2022    Intractable abdominal pain 2022    Epidermoid cyst of neck 2022    Hypotension 2021    [facility] ) 2021    Cellulitis of multiple sites of head and neck 2021    Elevated troponin 2021    History of DVT (deep vein thrombosis) 2021    Positive blood culture 2021    QT prolongation 2021    COPD (chronic obstructive pulmonary disease) ([facility] ) 05/10/2021    Hypothyroidism 05/10/2021    Medical non-compliance 2017    Atrial fibrillation ([facility] ) 2017    Foot pain 11/15/2016    Knee pain 11/15/2016    S/p small bowel obstruction 2016    Recurrent bronchospasm 10/16/2016    Venous stasis dermatitis of both lower extremities 10/16/2016    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    Obesity hypoventilation syndrome ([facility] ) 2016    Chronic diastolic heart failure (HCC) 2016    MRSA (methicillin resistant Staphylococcus aureus) Tracheobronchitis 2016    Atrial fibrillation with rapid ventricular response ([facility] ) 08/15/2016    Morbid obesity ([facility] ) 08/15/2016    Tremor 08/15/2016      LOS (days): 0  Geometric Mean LOS (GMLOS) (days):   Days to GMLOS:     OBJECTIVE:              Current admission status: Observation       Preferred Pharmacy:   [address] [place] [address], [address]  [address] [facility] [address]  Phone: [phone] Fax: [phone]    Primary Care Provider: Rosi Ledesma MD    Primary Insurance: [id number]  Secondary Insurance:     ASSESSMENT:  [address], [address] Other   Primary Phone: [phone] (Mobile)  Home Phone: [phone] DETAILS:                                          Other Referral/Resources/Interventions Provided:  Interventions: Transportation, Facility Return  Referral Comments: CM placed a referral via [address] for return to facility  CM placed a referral via [employer] for transportation back to pt's facility  Treatment Team Recommendation: Facility Return     Transport at Discharge : [facility]  Dispatcher Contacted: Yes  Number/Name of Dispatcher: [facility]  Transported by [employer] and Unit #): [facility]  ETA of Transport (Date): 09/10/22  ETA of Transport (Time): Renetta Aden Name, [address] : [place] [facility] at [place], [place]  Receiving Facility/Agency Phone Number: [phone]  Facility/Agency Fax Number: [phone]         CM notified facility Kevyn Leon of pt's transportation time  CM notified charge RN of pt's transportation time

## 2022-09-12 VITALS
SYSTOLIC BLOOD PRESSURE: 130 MMHG | BODY MASS INDEX: 42.66 KG/M2 | HEIGHT: 72 IN | TEMPERATURE: 97.8 F | WEIGHT: 315 LBS | HEART RATE: 75 BPM | DIASTOLIC BLOOD PRESSURE: 60 MMHG

## 2022-09-12 PROBLEM — K63.2 ENTEROCUTANEOUS FISTULA: Status: ACTIVE | Noted: 2022-09-12

## 2022-09-12 PROBLEM — R77.8 ELEVATED TROPONIN: Status: RESOLVED | Noted: 2021-11-08 | Resolved: 2022-09-12

## 2022-09-12 PROBLEM — I95.9 HYPOTENSION: Status: RESOLVED | Noted: 2021-12-01 | Resolved: 2022-09-12

## 2022-09-12 PROBLEM — R10.9 INTRACTABLE ABDOMINAL PAIN: Status: RESOLVED | Noted: 2022-03-14 | Resolved: 2022-09-12

## 2022-09-12 PROBLEM — J96.00 ACUTE RESPIRATORY FAILURE (HCC): Status: RESOLVED | Noted: 2022-06-17 | Resolved: 2022-09-12

## 2022-09-12 PROBLEM — L03.811 CELLULITIS OF MULTIPLE SITES OF HEAD AND NECK: Status: RESOLVED | Noted: 2021-11-26 | Resolved: 2022-09-12

## 2022-09-12 PROBLEM — L03.221 CELLULITIS OF MULTIPLE SITES OF HEAD AND NECK: Status: RESOLVED | Noted: 2021-01-01 | Resolved: 2022-01-01

## 2022-09-12 PROBLEM — Z91.19 MEDICAL NON-COMPLIANCE: Status: RESOLVED | Noted: 2017-02-13 | Resolved: 2022-09-12

## 2022-09-12 PROBLEM — Z91.199 MEDICAL NON-COMPLIANCE: Status: RESOLVED | Noted: 2017-02-13 | Resolved: 2022-09-12

## 2022-09-12 PROBLEM — R78.81 POSITIVE BLOOD CULTURE: Status: RESOLVED | Noted: 2021-07-06 | Resolved: 2022-01-01

## 2022-09-12 PROBLEM — L72.0 EPIDERMOID CYST OF NECK: Status: RESOLVED | Noted: 2022-01-01 | Resolved: 2022-01-01

## 2022-09-12 NOTE — PROGRESS NOTES
Assessment/Plan:   Diagnoses and all orders for this visit:    Enterocutaneous fistula      the fistula seems to be closing  Patient's wounds are also healing satisfactorily  Will follow-up in the nursing home  Continue treatment as has been prescribed  Subjective:      Patient ID: Ruddy Guo is a 47 y o  male  Resident of [facility]    HPI   Patient is 3 months status post exploratory laparotomy for adhesive small bowel obstruction  Surgery was complicated by patient's morbid obesity  His abdomen was left open and was subsequently closed with Vicryl mesh interposition and split-thickness skin graft from the left thigh  He also had developed an enterocutaneous fistula which was managed by application of a wound manager  Patient has been in the nursing home for approximately 2 months  The following portions of the patient's history were reviewed and updated as appropriate: allergies, current medications, past family history, past medical history, past social history, past surgical history and problem list     Review of Systems    Morbid obesity  History of pulmonary emboli      Objective:      /60   Pulse 75   Temp 97 8 °F (36 6 °C)   Ht 6' (1 829 m)   Wt (!) 190 kg (418 lb 14 oz)   BMI 56 81 kg/m²      Physical Exam    Patient appeared more lethargic than usual    Examination of the abdomen reveals that the split-thickness skin graft is almost 95% 'taken'  There are small superficial open areas for which nonadhesive dressing has been applied  The fistula which is at the right side of the abdomen is almost closed with minimal output of less than 50 cc a day  The skin graft donor site on the left thigh is also healing satisfactorily

## 2022-10-01 NOTE — APP STUDENT NOTE
Assessment & Plan   Atrial fibrillation with RVR ([facility] [address] )  -patient has some chest pain today that he ranks a 6/10 as well as shortness of breath  -he presented with a HR of 130, but today has a heart rate of 107  -if HR hits 115 start IV Cardizem until HR is below 100   -maintain HR and andgina with Lopressor 100mg BID   -continue Pradaxa for anticoagulation  -monitor with tele    Acute on Chronic Diastolic Congestive Heart Failure  -Home regimen is torsemide 40mg BID  -hospital started IV Lasix 80mg BID, continue  -Cardiology evaluation   -Continue with 1500ml fluid restriction and cardiac diet    Chronic respiroty failure with hypoxia  -baseline is 2L/minute chronically  -Currently taking 3L/min  -Wean down to 2L/min as tolerated    Hypothyroidism  -TSH wnl  -continue synthroid      Diabetes Mellitus  -Maintained by diet, A1C is 5 4    Morbid Obesity  -BMI is 73 29kg/m2  -Dietician consult  -Bariatric sx referral      Subjective     Patient is a 52y  o  male that presents today with mild chest pain  He ranks the chest pain a 4/10  It is partially alleviated by taking a few deep breaths, but the pain remains  The chest pain does not radiate  He still feels short of breath and is worried he might not return to baseline for breathing  He also complains of random jolting throughout his body  He feels more anxious than he has been recently  He denies any nausea, weakness, headache, changes in stool pattern, or profuse sweating outside of his normal          Temp:  [97 6 °F (36 4 °C)-98 1 °F (36 7 °C)] 98 °F (36 7 °C)  HR:  [100-107] 101  Resp:  [20] 20  BP: (116-156)/(55-88) 131/57  FiO2 (%):  [45] 45  I/O last 3 completed shifts: In: 1020 [P O :1020]  Out: 3100 [Urine:3100]  No intake/output data recorded  Objective:  General Appearance:  Comfortable (morbidly obese)  Vital signs: (most recent): Blood pressure 131/57, pulse 101, temperature 98 °F (36 7 °C), temperature source Oral, resp   rate 20, height 5' 11" (1 803 m), weight (!) 238 kg (525 lb 8 oz), SpO2 97 %  ()  Output: Producing urine and producing stool  HEENT: Normal HEENT exam     Lungs:  Normal effort and normal respiratory rate  Breath sounds clear to auscultation  He is not in respiratory distress  No stridor  No rales, wheezes or rhonchi  Heart: Tachycardia  Irregular rhythm  No murmur, gallop or friction rub  Chest: Symmetric chest wall expansion  No chest wall tenderness  Abdomen: Abdomen is soft  Bowel sounds are normal    There is no abdominal tenderness  Extremities: (LE Edema)  Skin:  Warm and pale  Principal Problem:    Atrial fibrillation with RVR (HCC)  Active Problems:     Morbid obesity ([facility] [address] )    Diabetes mellitus ([facility] [address] )    Obesity hypoventilation syndrome/FILIPPO    Acute on chronic diastolic congestive heart failure (HCC)    Hypothyroidism    Chronic respiratory failure with hypoxia ([facility] [address] ) Strong peripheral pulses/Capillary refill less/equal to 2 seconds

## 2022-10-16 PROBLEM — R57.9 SHOCK (HCC): Status: ACTIVE | Noted: 2022-01-01

## 2022-10-16 PROBLEM — R56.9 SEIZURE-LIKE ACTIVITY (HCC): Status: ACTIVE | Noted: 2022-01-01

## 2022-10-16 PROBLEM — I46.9 CARDIAC ARREST (HCC): Status: ACTIVE | Noted: 2022-01-01

## 2022-10-16 NOTE — ED PROVIDER NOTES
History  Chief Complaint   Patient presents with   • Altered Mental Status     Brought in from SNF via EMS, found unresponsive reports not eating/drinking x 2 weeks     Patient is a 60-year-old male who came in from his nursing facility where he was found minimally responsive, in respiratory distress, hypotensive, history and review of systems are limited by patient's severity of illness  Patient is well-known to the emergency department, patient is a full code  Prior to Admission Medications   Prescriptions Last Dose Informant Patient Reported? Taking? Cholecalciferol (VITAMIN D) 50 MCG (2000 UT) tablet  Outside Facility (Specify) Yes No   Sig: Take 2,000 Units by mouth daily   Potassium Chloride (KCL-20 PO)   Yes No   Sig: Take 20 mg by mouth in the morning   QUEtiapine (SEROquel) 50 mg tablet   No No   Sig: Take 1 tablet (50 mg total) by mouth daily at bedtime   acetaminophen (TYLENOL) 325 mg tablet   No No   Sig: Take 3 tablets (975 mg total) by mouth every 8 (eight) hours   aluminum-magnesium hydroxide-simethicone (MYLANTA) 200-200-20 mg/5 mL suspension   No No   Sig: Take 30 mL by mouth every 6 (six) hours as needed for indigestion or heartburn   ammonium lactate (LAC-HYDRIN) 12 % cream   No No   Sig: Apply topically 2 (two) times a day   buPROPion (FORFIVO XL) 450 MG 24 hr tablet   No No   Sig: Take 1 tablet (450 mg total) by mouth daily   budesonide-formoterol (SYMBICORT) 160-4 5 mcg/act inhaler   Yes No   Sig: Inhale 2 puffs 2 (two) times a day Rinse mouth after use     cyanocobalamin (VITAMIN B-12) 1000 MCG tablet  Outside Facility (Specify) Yes No   Sig: Take 1,000 mcg by mouth daily   dabigatran etexilate (PRADAXA) 150 mg capsu  Outside Facility (Specify) Yes No   Sig: Take 150 mg by mouth 2 (two) times a day   dicyclomine (BENTYL) 20 mg tablet   No No   Sig: Take 1 tablet (20 mg total) by mouth 4 (four) times a day (before meals and at bedtime)   digoxin (LANOXIN) 0 25 mg tablet   No No Sig: Take 1 tablet (250 mcg total) by mouth daily   docusate sodium (COLACE) 100 mg capsule   No No   Sig: Take 1 capsule (100 mg total) by mouth 2 (two) times a day   famotidine (PEPCID) 20 mg tablet   No No   Sig: Take 1 tablet (20 mg total) by mouth daily   levothyroxine 25 mcg tablet  Outside Facility (Specify) Yes No   Sig: Take 25 mcg by mouth daily   metoprolol tartrate (LOPRESSOR) 25 mg tablet   No No   Sig: Take 1 tablet (25 mg total) by mouth every 8 (eight) hours   pantoprazole (PROTONIX) 40 mg tablet   No No   Sig: Take 1 tablet (40 mg total) by mouth 2 (two) times a day   polyethylene glycol (MIRALAX) 17 g packet   No No   Sig: Take 17 g by mouth daily   sodium chloride (OCEAN) 0 65 % nasal spray   No No   Si spray into each nostril as needed for congestion   sodium chloride 1 g tablet   No No   Sig: Take 2 tablets (2 g total) by mouth 3 (three) times a day   spironolactone (ALDACTONE) 50 mg tablet  Outside Facility (Specify) Yes No   Sig: Take 50 mg by mouth daily   sucralfate (CARAFATE) 1 g tablet   No No   Sig: Take 1 tablet (1 g total) by mouth 4 (four) times a day (before meals and at bedtime)   torsemide 40 MG TABS   No No   Sig: Take 40 mg by mouth 2 (two) times a day      Facility-Administered Medications: None       Past Medical History:   Diagnosis Date   • Afib (HCC)    • Anemia    • Anxiety    • Cancer (HCC)    • Cardiac disease    • Cellulitis    • COPD (chronic obstructive pulmonary disease) (HCC)    • Depression    • Diabetes mellitus (HCC)    • DVT (deep venous thrombosis) (HCC)    • GERD (gastroesophageal reflux disease)    • HBP (high blood pressure)    • Heart failure (HCC)    • Hx of blood clots    • Hypertension    • Hypokalemia    • Hypothyroid    • Obesity    • Psychiatric disorder        Past Surgical History:   Procedure Laterality Date   • ABDOMINAL HERNIA REPAIR  2019   • CHOLECYSTECTOMY      Lap   • GASTRECTOMY SLEEVE LAPAROSCOPIC  2018   • INCISION AND DRAINAGE OF WOUND Bilateral 12/01/2021    Procedure: INCISION AND DRAINAGE (I&D) HEAD/FACE;  Surgeon: Destiny Cohen MD;  Location:  MAIN OR;  Service: General   • IR PICC PLACEMENT DOUBLE LUMEN  06/13/2022   • IVC FILTER INSERTION  2018   • KNEE SURGERY Right     open wound, injury    • LAPAROTOMY N/A 6/14/2022    Procedure: LAPAROTOMY EXPLORATORY, EXTENSIVE LYSIS OF ADHESIONS, APPLICATION OF ABTHERA WOUND VAC;  Surgeon: Jyothi Georges MD;  Location: [address] [place] [address];  Service: General   • LAPAROTOMY N/A 6/15/2022    Procedure: LAPAROTOMY EXPLORATORY WITH REPAIR OF SEROSAL INJURY;  Surgeon: Farhana Nath MD;  [facility]: [facility];  Service: General   • LEG SURGERY Right 2009   • SPLIT THICKNESS SKIN GRAFT N/A 6/30/2022    Procedure: incision and drainage, wash out vac change;  Surgeon: Rakan Ignacio DO;  [facility];  Service: General   • SPLIT THICKNESS SKIN GRAFT N/A 7/5/2022    Procedure: PREPERATION RECIPIENT STSG SITE, DEBRIDEMENT NONVIABLE SKIN EDGE; PREPERATION RECIPIENT SKIN GRAFT SITE; APPLICATION OF SKIN SUBSTITUTE TO DONOR SITE; APPLICATION WOUND VAC X2 GREATER THAN 50CM;   Surgeon: Radha Ring DO;  [facility];  Service: General   • US GUIDED VASCULAR ACCESS  08/06/2018   • VAC DRESSING APPLICATION N/A 4/32/1841    Procedure: CHANGE DRESSING/VAC ABDOMEN;  Surgeon: Farhana Nath MD;  [facility];  Service: General   • VAC DRESSING APPLICATION N/A 5/65/6434    Procedure: ABDOMINAL WASHOUT, REPAIR OF SEROSAL TEARS,BRIDING VICRYL MESH, PARTIAL CLOSURE, APPLICATION OF WOUND VAC;  Surgeon: Rakan Ignacio DO;  [facility];  Service: General   • VAC DRESSING APPLICATION N/A 3/47/4436    Procedure: CHANGE DRESSING/VAC ABDOMEN;  Surgeon: Rakan Ignacio DO;  [facility];  Service: General       Family History   Problem Relation Age of Onset   • Lung cancer Father    • Diabetes Maternal Aunt    • Heart attack Mother    • Clotting disorder Mother    • Hypertension Mother    • Heart failure Mother    • Diabetes Mother    • Atrial fibrillation Mother    • Sleep apnea Mother    • Obesity Mother    • Asthma Sister    • Stroke Neg Hx    • Anuerysm Neg Hx    • Arrhythmia Neg Hx    • Hyperlipidemia Neg Hx         pt unsure   • Fainting Neg Hx      I have reviewed and agree with the history as documented  E-Cigarette/Vaping   • E-Cigarette Use Never User      E-Cigarette/Vaping Substances   • Nicotine No    • THC No    • CBD No    • Flavoring No    • Other No    • Unknown No      Social History     Tobacco Use   • Smoking status: Former Smoker     Packs/day: 1 00     Years: 15 00     Pack years: 15 00   • Smokeless tobacco: Never Used   • Tobacco comment: 1 5ppd x 23 years, quit 2014   Vaping Use   • Vaping Use: Never used   Substance Use Topics   • Alcohol use: Not Currently   • Drug use: No       Review of Systems   Unable to perform ROS: Acuity of condition       Physical Exam  Physical Exam  Vitals and nursing note reviewed  Constitutional:       General: He is not in acute distress  Appearance: Normal appearance  Comments: Patient is morbidly obese   HENT:      Head: Normocephalic and atraumatic  Right Ear: External ear normal       Left Ear: External ear normal       Mouth/Throat:      Pharynx: Oropharynx is clear  Comments: Significantly dry oropharynx with cobblestoning of the tongue, gag reflex is intact  Eyes:      Conjunctiva/sclera: Conjunctivae normal       Pupils: Pupils are equal, round, and reactive to light  Cardiovascular:      Rate and Rhythm: Bradycardia present  Rhythm irregular  Heart sounds: Normal heart sounds  Pulmonary:      Effort: Respiratory distress present  Breath sounds: Normal breath sounds  Abdominal:      General: Abdomen is flat  There is no distension  Palpations: Abdomen is soft  Tenderness: There is no abdominal tenderness        Comments: Multiple abdominal wounds under his pannus that are foul smelling and draining purulence discharge   Musculoskeletal:         General: No deformity  Normal range of motion  Cervical back: Normal range of motion  Skin:     General: Skin is warm and dry  Neurological:      General: No focal deficit present  GCS: GCS eye subscore is 2  GCS verbal subscore is 1  GCS motor subscore is 5        Comments: Patient was responsive to painful stimuli, opens his eyes to noxious stimuli, does not make any noise   Psychiatric:      Comments: Unable to be assessed due to critical care         Vital Signs  ED Triage Vitals   Temp Pulse Respirations Blood Pressure SpO2   -- 10/16/22 1709 10/16/22 1709 10/16/22 1709 10/16/22 1709    (!) 109 14 (!) 74/58 97 %      Temp src Heart Rate Source Patient Position - Orthostatic VS BP Location FiO2 (%)   -- 10/16/22 1709 10/16/22 1749 10/16/22 1749 --    Monitor Lying Left arm       Pain Score       --                  Vitals:    10/16/22 1749 10/16/22 1753 10/16/22 1806 10/16/22 1816   BP: (!) 74/38 (!) 86/60 (!) 94/49 110/61   Pulse: (!) 124 (!) 114 105 (!) 106   Patient Position - Orthostatic VS: Lying Lying Lying Lying         Visual Acuity      ED Medications  Medications   norepinephrine (LEVOPHED) 1 mg/mL injection **ADS Override Pull** (has no administration in time range)   norepinephrine (LEVOPHED) 4 mg (STANDARD CONCENTRATION) IV in sodium chloride 0 9% 250 mL (25 mcg/min Intravenous Rate/Dose Change 10/16/22 1807)   fentanyl citrate (PF) 100 MCG/2ML **ADS Override Pull** (has no administration in time range)   fentaNYL 1000 mcg in sodium chloride 0 9% 100mL infusion (has no administration in time range)   sodium chloride 0 9 % bolus 1,000 mL (1,000 mL Intravenous New Bag 10/16/22 1734)   EPINEPHrine (ADRENALIN) injection (1 mg Intravenous Given 10/16/22 1736)       Diagnostic Studies  Results Reviewed     Procedure Component Value Units Date/Time    CBC and differential [[id number]]  (Abnormal) Collected: 10/16/22 1724    Lab Status: Final result Specimen: Blood from Arm, Right Updated: 10/16/22 1829     WBC 5 79 Thousand/uL      RBC 3 33 Million/uL      Hemoglobin 9 8 g/dL      Hematocrit 32 8 %      MCV 99 fL      MCH 29 4 pg      MCHC 29 9 g/dL      RDW 15 3 %      MPV 9 4 fL      Platelets 574 Thousands/uL     Manual Differential(PHLEBS Do Not Order) [[id number]]  (Abnormal) Collected: 10/16/22 1724    Lab Status: Final result Specimen: Blood from Arm, Right Updated: 10/16/22 1829     Segmented % 76 %      Lymphocytes % 16 %      Monocytes % 2 %      Eosinophils, % 0 %      Basophils % 0 %      Metamyelocytes% 1 %      Myelocytes % 5 %      Absolute Neutrophils 4 40 Thousand/uL      Lymphocytes Absolute 0 93 Thousand/uL      Monocytes Absolute 0 12 Thousand/uL      Eosinophils Absolute 0 00 Thousand/uL      Basophils Absolute 0 00 Thousand/uL      Total Counted --     nRBC 7 /100 WBC      Toxic Granulation Present     RBC Morphology Present     Basophilic Stippling Present     Macrocytes Present     Polychromasia Present     Stomatocytes Present     Target Cells Present     Platelet Estimate Adequate    Blood gas, arterial [[id number]]     Lab Status: No result Specimen: Blood, Arterial     Procalcitonin [[id number]]  (Abnormal) Collected: 10/16/22 1724    Lab Status: Final result Specimen: Blood from Arm, Right Updated: 10/16/22 1759     Procalcitonin 0 36 ng/ml     Lactic acid [[id number]]  (Normal) Collected: 10/16/22 1724    Lab Status: Final result Specimen: Blood from Arm, Right Updated: 10/16/22 1751     LACTIC ACID 1 7 mmol/L     Narrative:      Result may be elevated if tourniquet was used during collection  Comprehensive metabolic panel [[id number]] Collected: 10/16/22 1724    Lab Status:  In process Specimen: Blood from Arm, Right Updated: 10/16/22 1739    APTT [[id number]] Updated: 10/16/22 1736    Lab Status: No result Specimen: Blood from Arm, Right     Protime-INR [[id number]] Updated: 10/16/22 1736    Lab Status: No result Specimen: Blood from Arm, Right     Blood culture #2 [[id number]] Collected: 10/16/22 1724    Lab Status: In process Specimen: Blood from Arm, Right Updated: 10/16/22 1733    Blood culture #1 [[id number]]     Lab Status: No result Specimen: Blood     UA w Reflex to Microscopic w Reflex to Culture [[id number]]     Lab Status: No result Specimen: Urine     Fingerstick Glucose (POCT) [[id number]]  (Abnormal) Collected: 10/16/22 1710    Lab Status: Final result Updated: 10/16/22 1711     POC Glucose 150 mg/dl                  XR chest 1 view portable    (Results Pending)   XR chest 1 view portable    (Results Pending)              Procedures  CriticalCare Time  Performed by: Rubi Lynn MD  Authorized by: Rubi Lynn MD     Critical care provider statement:     Critical care time (minutes):  45    Critical care was necessary to treat or prevent imminent or life-threatening deterioration of the following conditions:  Cardiac failure, circulatory failure and dehydration    Critical care was time spent personally by me on the following activities:  Evaluation of patient's response to treatment, examination of patient, interpretation of cardiac output measurements, ordering and performing treatments and interventions, ordering and review of laboratory studies, ordering and review of radiographic studies, re-evaluation of patient's condition and ventilator management  Intubation    Date/Time: 10/16/2022 5:42 PM  Performed by: Rubi Lynn MD  Authorized by: Rubi Lynn MD     Patient location:  ED  Other Assisting Provider: No    Consent:     Consent obtained:  Emergent situation  Universal protocol:     Patient identity confirmed:   Anonymous protocol, patient vented/unresponsive  Pre-procedure details:     Patient status:  Unresponsive    Mallampati score:  3    Pretreatment medications:  None    Paralytics:  None  Indications: Indications for intubation: respiratory failure    Procedure details:     Preoxygenation:  Bag valve mask    CPR in progress: yes      Intubation method:  Oral    Oral intubation technique:  Direct    Laryngoscope blade: Mac 4    Tube size (mm):  8 0    Tube type:  Cuffed    Number of attempts:  1    Ventilation between attempts: no      Cricoid pressure: no      Tube visualized through cords: yes    Placement assessment:     ETT to lip:  24    Tube secured with:  ETT owens    Breath sounds:  Equal    Placement verification: chest rise, condensation, colorimetric ETCO2 device, CXR verification and direct visualization      CXR findings:  ETT in proper place    Ventilator settings:  Tidal volume 450, FiO2 100%, peep of 10, respiratory rate 12  Post-procedure details:     Patient tolerance of procedure: Tolerated well, no immediate complications  ECG 12 Lead Documentation Only    Date/Time: 10/16/2022 6:53 PM  Performed by: Quintin Ybarra MD  Authorized by: Quintin Ybrara MD     ECG reviewed by me, the ED Provider: yes    Patient location:  ED  Previous ECG:     Previous ECG:  Unavailable  Interpretation:     Interpretation: abnormal    Rate:     ECG rate:  91    ECG rate assessment: normal    Rhythm:     Rhythm: atrial fibrillation    Ectopy:     Ectopy: PVCs      PVCs:  Frequent  QRS:     QRS axis:  Normal    QRS intervals: Wide  Conduction:     Conduction: abnormal      Abnormal conduction: complete RBBB    ST segments:     ST segments:  Normal  T waves:     T waves: inverted    Central Line    Date/Time: 10/16/2022 6:56 PM  Performed by: Quintin Ybarra MD  Authorized by: Quintin Ybarra MD     Patient location:  ED  Other Assisting Provider: No    Consent:     Consent obtained:  Emergent situation  Universal protocol:     Patient identity confirmed:   Anonymous protocol, patient vented/unresponsive  Pre-procedure details:     Hand hygiene: Hand hygiene performed prior to insertion      Sterile barrier technique: All elements of maximal sterile technique followed      Skin preparation:  ChloraPrep    Skin preparation agent: Skin preparation agent completely dried prior to procedure    Indications:     Central line indications: medications requiring central line      Site selection rationale:  Right IJ was used as the patient has a very large pannus and femoral access was unobtainable  Anesthesia (see MAR for exact dosages): Anesthesia method:  None  Procedure details:     Location:  Right internal jugular    Vessel type: vein      Laterality:  Right    Approach: percutaneous technique used      Patient position:  Reverse Trendelenburg    Catheter type:  Triple lumen    Ultrasound guidance: yes      Ultrasound image availability:  Not obtained due to urgency    Sterile ultrasound techniques: Sterile gel and sterile probe covers were used      Number of attempts:  1    Successful placement: yes      Vessel of catheter tip end: In right atria  Post-procedure details:     Post-procedure:  Dressing applied and line sutured    Assessment:  Blood return through all ports, no pneumothorax on x-ray, free fluid flow and placement verified by x-ray    Post-procedure complications: none      Patient tolerance of procedure: Tolerated well, no immediate complications             ED Course         MDM  Number of Diagnoses or Management Options  Diagnosis management comments: Patient went into cardiac arrest, required CPR for 2 rounds, was given epinephrine, achieve Ross, started on Levophed drip, intubated during CPR without pausing compressions, a central line was placed in the right IJ successfully on the 1st attempt, the patient will be started on antibiotics, will be transferred to [facility] for further management      4616  Patient has been accepted by Dr Alfred Mcgarry at [facility], who is requesting a CT prior to transport if there is the ability to do so, the patient weight wise will fit on her CT scanner so a CT will be attempted and patient will be transferred via priority 1 transfer  1935  The transport service reports the [facility] is no longer an option, he will instead be transferred to [facility], has been accepted by Dr Raquel Ramachandran  Disposition  Final diagnoses:   None     ED Disposition     None      Follow-up Information    None         Patient's Medications   Discharge Prescriptions    No medications on file       No discharge procedures on file      PDMP Review       Value Time User    PDMP Reviewed  Yes 7/26/2022 11:02 PM Beba Davey MD          ED Provider  Electronically Signed by as applicable and the Disposition within this note     Time User Action Codes Description Comment    10/16/2022  7:38 PM Ricky Doctor Add [J96 92] Hypercapnic respiratory failure ([facility] [address] )     10/16/2022  7:39 PM Ricky Doctor Add [I46 9] Cardiac arrest ([facility] [address] )     10/16/2022  7:39 PM Ricky Doctor Add [I50 9] [facility]     10/16/2022  7:39 PM Ricky Doctor Add [E86 0] Severe dehydration       ED Disposition     ED Disposition   Transfer to Another Facility-In Network    Condition   --    Date/Time   Sun Oct 16, 2022  7:38 PM    Comment   Kavon Abreu should be transferred out to seen Omaira Valle, accepted by Dr Bernadette Tenorio MD Documentation    Enrique Lemos Most Recent Value   Patient Condition The patient has been stabilized such that within reasonable medical probability, no material deterioration of the patient condition or the condition of the unborn child(margarita) is likely to result from the transfer   Reason for Transfer Level of Care needed not available at this facility   Benefits of Transfer Specialized equipment and/or services available at the receiving facility (Include comment)________________________   Risks of Transfer Potential for delay in receiving treatment, Potential deterioration of medical condition   Accepting Physician Dr Bernadette Lizarraga MD   Provider Certification General risk, such as traffic hazards, adverse weather conditions, rough terrain or turbulence, possible failure of equipment (including vehicle or aircraft), or consequences of actions of persons outside the control of the transport personnel, Unanticipated needs of medical equipment and personnel during transport      RN Documentation    Flowsheet Row Most Recent Value   Transport Mode Ambulance   Level of Care CCT-Nurse      Follow-up Information    None       Date, Time and Cause of Death    Date of Death: 10/18/22  Time of Death:  2:41 PM  Preliminary Cause of Death: Cardiac arrest  Entered by: Dr Vandana Lynn DO[ZB1 1]     Attribution     ZB1  Brice MORGAN  89 ,  10/18/22 14:46        Discharge Medication List as of 10/16/2022  8:22 PM      CONTINUE these medications which have NOT CHANGED    Details   acetaminophen (TYLENOL) 325 mg tablet Take 3 tablets (975 mg total) by mouth every 8 (eight) hours, Starting Tue 7/19/2022, Normal      aluminum-magnesium hydroxide-simethicone (MYLANTA) 200-200-20 mg/5 mL suspension Take 30 mL by mouth every 6 (six) hours as needed for indigestion or heartburn, Starting Fri 3/18/2022, Normal      ammonium lactate (LAC-HYDRIN) 12 % cream Apply topically 2 (two) times a day, Starting Mon 11/15/2021, No Print      budesonide-formoterol (SYMBICORT) 160-4 5 mcg/act inhaler Inhale 2 puffs 2 (two) times a day Rinse mouth after use , Historical Med      buPROPion (FORFIVO XL) 450 MG 24 hr tablet Take 1 tablet (450 mg total) by mouth daily, Starting Tue 11/16/2021, No Print      Cholecalciferol (VITAMIN D) 50 MCG (2000 UT) tablet Take 2,000 Units by mouth daily, Historical Med      cyanocobalamin (VITAMIN B-12) 1000 MCG tablet Take 1,000 mcg by mouth daily, Historical Med      dabigatran etexilate (PRADAXA) 150 mg capsu Take 150 mg by mouth 2 (two) times a day, Historical Med      dicyclomine (BENTYL) 20 mg tablet Take 1 tablet (20 mg total) by mouth 4 (four) times a day (before meals and at bedtime), Starting Fri 3/18/2022, Normal      digoxin (LANOXIN) 0 25 mg tablet Take 1 tablet (250 mcg total) by mouth daily, Starting Wed 7/20/2022, Normal      docusate sodium (COLACE) 100 mg capsule Take 1 capsule (100 mg total) by mouth 2 (two) times a day, Starting Fri 3/18/2022, Normal      famotidine (PEPCID) 20 mg tablet Take 1 tablet (20 mg total) by mouth daily, Starting Sat 3/19/2022, Normal      levothyroxine 25 mcg tablet Take 25 mcg by mouth daily, Historical Med      metoprolol tartrate (LOPRESSOR) 25 mg tablet Take 1 tablet (25 mg total) by mouth every 8 (eight) hours, Starting Tue 7/19/2022, Normal      pantoprazole (PROTONIX) 40 mg tablet Take 1 tablet (40 mg total) by mouth 2 (two) times a day, Starting Wed 8/3/2022, No Print      polyethylene glycol (MIRALAX) 17 g packet Take 17 g by mouth daily, Starting Sat 3/19/2022, Normal      Potassium Chloride (KCL-20 PO) Take 20 mg by mouth in the morning, Historical Med      QUEtiapine (SEROquel) 50 mg tablet Take 1 tablet (50 mg total) by mouth daily at bedtime, Starting Tue 7/19/2022, Print      sodium chloride (OCEAN) 0 65 % nasal spray 1 spray into each nostril as needed for congestion, Starting Mon 11/15/2021, No Print      sodium chloride 1 g tablet Take 2 tablets (2 g total) by mouth 3 (three) times a day, Starting Tue 7/19/2022, Normal      spironolactone (ALDACTONE) 50 mg tablet Take 50 mg by mouth daily, Historical Med      sucralfate (CARAFATE) 1 g tablet Take 1 tablet (1 g total) by mouth 4 (four) times a day (before meals and at bedtime), Starting Wed 8/3/2022, No Print      torsemide 40 MG TABS Take 40 mg by mouth 2 (two) times a day, Starting Fri 3/18/2022, Normal             No discharge procedures on file      PDMP Review       Value Time User    PDMP Reviewed  Yes 10/18/2022  5:25 PM Ronaldo Co, DO          ED Provider  Electronically Signed by           Christina Merritt MD  10/24/22 5388

## 2022-10-16 NOTE — ED NOTES
Charge nurse at [facility] updated about patient's status and transportation [address] [facility], [address] [place] [address]  10/16/22 1945

## 2022-10-16 NOTE — ED NOTES
SLE to [facility]  Accepting Dr Candace Goodson   [phone] with Michael Pagan  [phone]     Lala Hanna RN  10/16/22 1691

## 2022-10-16 NOTE — EMTALA/ACUTE CARE TRANSFER
[facility] EMERGENCY DEPARTMENT  [address] [facility] [address]  Dept: [phone]      EMTALA TRANSFER CONSENT    NAME Harmony ELIZABETH 1970                              MRN [id number]    I have been informed of my rights regarding examination, treatment, and transfer   by Dr Rothman New: Specialized equipment and/or services available at the receiving facility (Include comment)________________________    Risks: Potential for delay in receiving treatment, Potential deterioration of medical condition      Consent for Transfer:  I acknowledge that my medical condition has been evaluated and explained to me by the emergency department physician or other qualified medical person and/or my attending physician, who has recommended that I be transferred to the service of  Accepting Physician: Dr Lita Brizuela at    The above potential benefits of such transfer, the potential risks associated with such transfer, and the probable risks of not being transferred have been explained to me, and I fully understand them  The doctor has explained that, in my case, the benefits of transfer outweigh the risks  I agree to be transferred  I authorize the performance of emergency medical procedures and treatments upon me in both transit and upon arrival at the receiving facility  Additionally, I authorize the release of any and all medical records to the receiving facility and request they be transported with me, if possible  I understand that the safest mode of transportation during a medical emergency is an ambulance and that the Hospital advocates the use of this mode of transport  Risks of traveling to the receiving facility by car, including absence of medical control, life sustaining equipment, such as oxygen, and medical personnel has been explained to me and I fully understand them      (BONTIA CORRECT BOX BELOW)  [  ]  I consent to the stated transfer and to be transported by ambulance/helicopter  [  ]  I consent to the stated transfer, but refuse transportation by ambulance and accept full responsibility for my transportation by car  I understand the risks of non-ambulance transfers and I exonerate the Hospital and its staff from any deterioration in my condition that results from this refusal     X___________________________________________    DATE  10/16/22  TIME________  Signature of patient or legally responsible individual signing on patient behalf           RELATIONSHIP TO PATIENT_________________________          Provider Certification    NAME Ruthie Loomis                                         1970                              MRN [id number]    A medical screening exam was performed on the above named patient  Based on the examination:    Condition Necessitating Transfer The primary encounter diagnosis was Hypercapnic respiratory failure ([address] )  Diagnoses of Cardiac arrest ([address] ), Acute heart failure ([address] ), and Severe dehydration were also pertinent to this visit      Patient Condition: The patient has been stabilized such that within reasonable medical probability, no material deterioration of the patient condition or the condition of the unborn child(margarita) is likely to result from the transfer    Reason for Transfer: Level of Care needed not available at this facility    Transfer Requirements: Facility     · Space available and qualified personnel available for treatment as acknowledged by    · Agreed to accept transfer and to provide appropriate medical treatment as acknowledged by       Dr Laura Oneill  · Appropriate medical records of the examination and treatment of the patient are provided at the time of transfer   [address], [address] _______  · Transfer will be performed by qualified personnel from    and appropriate transfer equipment as required, including the use of necessary and appropriate life support measures  Provider Certification: I have examined the patient and explained the following risks and benefits of being transferred/refusing transfer to the patient/family:  General risk, such as traffic hazards, adverse weather conditions, rough terrain or turbulence, possible failure of equipment (including vehicle or aircraft), or consequences of actions of persons outside the control of the transport personnel, Unanticipated needs of medical equipment and personnel during transport      Based on these reasonable risks and benefits to the patient and/or the unborn child(margarita), and based upon the information available at the time of the patient’s examination, I certify that the medical benefits reasonably to be expected from the provision of appropriate medical treatments at another medical facility outweigh the increasing risks, if any, to the individual’s medical condition, and in the case of labor to the unborn child, from effecting the transfer      X____________________________________________ DATE 10/16/22        TIME_______      ORIGINAL - SEND TO MEDICAL RECORDS   COPY - SEND WITH PATIENT DURING TRANSFER

## 2022-10-17 NOTE — UTILIZATION REVIEW
Initial Clinical Review    Admission: Date/Time/Statement:   Admission Orders (From admission, onward)     Ordered        10/16/22 2059  Inpatient Admission  Once                      Orders Placed This Encounter   Procedures   • Inpatient Admission     Standing Status:   Standing     Number of Occurrences:   1     Order Specific Question:   Level of Care     Answer:   Critical Care [15]     Order Specific Question:   Estimated length of stay     Answer:   More than 2 Midnights     Order Specific Question:   Certification     Answer:   I certify that inpatient services are medically necessary for this patient for a duration of greater than two midnights  See H&P and MD Progress Notes for additional information about the patient's course of treatment  ED Arrival Information     Patient not seen in ED                     No chief complaint on file  Initial Presentation: 47 y o  male history of MO, OHS, FILIPPO, AFib, hypothyroidism, HTN, CHF, DVT, SBO w/ EC fistula who presents as transfer from [address] [place] [address] to [facility] for higher level of care   Pt initially presents to [facility] via EMS from  nursing facility after noted minimally responsive, only to noxious stimuli ,in respiratory distress and hypotensive  Pt in respiratory distress at OS with ABG showing pH 7 36/pCO2 96 5/bicarb 54  Pt was intubated and placed on mechanical ventilation on AC/VC  He received fentanyl and ketamine for sedation  Central line was placed in the right jugular  Workup for infectious etiologies unremarkable, with no leukocytosis, lactic acid below 2, but procalcitonin 0 36  Workup for cardiac source included an EKG which showed AFib with multiple PVCs and right bundle-branch block  Electrolytes and CBC at that time were unremarkable  While at [facility], patient went to cardiac arrest and received epinephrine x1, in addition to amiodarone, with ROSC achieved  Central line placed and pt started on IV abx Pt transferred to [address] [place] [address] [facility] for  care   On exam, WENDY Zendejas, GCS 8, Pt intubated, vented, tachycardic and irreg rate ,seizure like activity  Pt given IV Lorazepam  Pt started on Amiodarone drip after IV Amiodarone boluses   Pt admitted as Inpatient to Critical care/ICU s/p cardiac arrest, Acute respiratory failure with hypoxia hypercapnia require mechanical ventilation, obesity hypo ventilation syndrome, A fib w/ RVR  , seizure like activity- possible Braulio's paralysis, as well as low frequency convulsions  Plan - cardiopulm monitoring, insert A line, pressor support withepinephrine, Levophed, vasopressin gtts-adjusted to maintain MAP greater than 65 mm Hg  2d echo, ontain BNP, monitor lyres and replete as needed   AC/VC at tidal volume 500/FiO2 of 45%/rate of 18/peep of 8  PRN ABG's   Heparin drip, obtain Dig level  Monitor for ictal activity  Consider EEG  F/U cx  Continue Iv abx   Date: 10/17   Day 2: Cardiogenic vs Distributive shock   Hypoxic hypercapnic respiratory failure ventilator dependent Afib w/ RVR  Pt w/o further seizure like activity overnight  Pt remains intubated and vented, sedated   Is tachypneic  Pt following commands  current RASS:-2   Remains on  Pressors, hypotensive- Epi at 8 this AM, Levo at 30, Vaso 0 04  Pt on  Insulin gtt 4 Units/hr,  Heparin drip, 11 1 units/kg/hr,  Fentanyl drip 50 mcg/hr,and Amiodarone drip 0 5 mg/min   Pt remains tachycardic  Failed cardioversion this am    Lactic acid increased to 8 5 after IVF (initial LA 1 7)  Bedside cardiac US showed hypodynamic LV  Pt with minimal UOP  Procal increased to 0 8 today  Plan - obtain CT C/A/P, f/u cultures, continue stress dose steroids, continue pressors,continue IV abx-vanco and cefepime  Monitor off diuretics  NPO  Monitor and replete lytes   Vent settings -AC/PC rate 2, IP 25, PEEP 10, FiO2 55%; Tidal volume: 490s  Wean as appropriate   Neuro checks   Continue Amiodarone drip  R A line          Initial  Vitals   Temperature Pulse Respirations Blood Pressure SpO2   10/16/22 2230 10/16/22 2200 10/16/22 2200 10/16/22 2200 10/16/22 2122   (!) 95 54 °F (35 3 °C) (!) 154 18 109/70 100 %      Temp Source Heart Rate Source Patient Position - Orthostatic VS BP Location FiO2 (%)   10/16/22 2230 10/16/22 2200 -- -- 10/17/22 0145   Probe Monitor   55      Pain Score       10/16/22 2307       Med Not Given for Pain - for MAR use only          Wt Readings from Last 1 Encounters:   10/17/22 (!) 177 kg (391 lb)     Additional Vital Signs:   Date/Time Temp Pulse Resp BP MAP (mmHg) Arterial Line BP MAP SpO2 FiO2 (%) O2 Device   10/17/22 1115 98 2 °F (36 8 °C) 152 Abnormal  24 Abnormal  -- -- 83/51 60 mmHg Abnormal  90 % -- --   10/17/22 1100 98 24 °F (36 8 °C) 154 Abnormal  24 Abnormal  -- -- 69/45 53 mmHg Abnormal  96 % -- --   10/17/22 1045 98 2 °F (36 8 °C) 149 Abnormal  24 Abnormal  -- -- 80/46 55 mmHg Abnormal  91 % -- --   10/17/22 1030 98 2 °F (36 8 °C) 158 Abnormal  24 Abnormal  -- -- 68/43 52 mmHg Abnormal  80 % Abnormal  -- --   10/17/22 1015 98 2 °F (36 8 °C) 151 Abnormal  24 Abnormal  -- -- 85/46 57 mmHg Abnormal  96 % -- --   10/17/22 1000 98 2 °F (36 8 °C) 151 Abnormal  24 Abnormal  -- -- 75/44 54 mmHg Abnormal  95 % -- --   10/17/22 0945 98 2 °F (36 8 °C) 150 Abnormal  24 Abnormal  -- -- 88/48 59 mmHg Abnormal  98 % -- --   10/17/22 0930 98 1 °F (36 7 °C) 142 Abnormal  24 Abnormal  -- -- 102/57 69 mmHg 99 % -- --   10/17/22 0915 98 1 °F (36 7 °C) 144 Abnormal  24 Abnormal  -- -- 97/55 66 mmHg 98 % -- --   10/17/22 0900 98 1 °F (36 7 °C) 146 Abnormal  24 Abnormal  -- -- 97/52 65 mmHg 97 % -- --   10/17/22 0845 98 1 °F (36 7 °C) 154 Abnormal  24 Abnormal  -- -- 98/56 68 mmHg 96 % -- --   10/17/22 0842 -- 150 Abnormal  -- -- -- -- -- -- -- --   10/17/22 0830 97 9 °F (36 6 °C) 146 Abnormal  24 Abnormal  -- -- 97/58 68 mmHg 95 % -- --   10/17/22 0815 97 9 °F (36 6 °C) 152 Abnormal  24 Abnormal  -- -- 91/54 64 mmHg Abnormal  98 % -- --   10/17/22 0800 97 7 °F (36 5 °C) 151 Abnormal  24 Abnormal  -- -- 93/55 66 mmHg 96 % -- --   10/17/22 0745 97 7 °F (36 5 °C) 151 Abnormal  24 Abnormal  -- -- 92/55 65 mmHg 98 % -- --   10/17/22 0730 97 5 °F (36 4 °C) 145 Abnormal  24 Abnormal  -- -- 93/58 67 mmHg 97 % 55 Ventilator   10/17/22 0715 97 5 °F (36 4 °C) 147 Abnormal  24 Abnormal  -- -- 90/54 64 mmHg Abnormal  96 % -- --   10/17/22 0700 97 34 °F (36 3 °C) Abnormal  148 Abnormal  30 Abnormal  78/52 Abnormal  -- 94/56 68 mmHg 90 % -- Ventilator   10/17/22 0645 97 34 °F (36 3 °C) Abnormal  141 Abnormal  24 Abnormal  -- -- 92/55 65 mmHg 95 % 55 --   10/17/22 0630 97 16 °F (36 2 °C) Abnormal  146 Abnormal  24 Abnormal  -- -- 89/54 64 mmHg Abnormal  96 % 55 --   10/17/22 0615 97 16 °F (36 2 °C) Abnormal  139 Abnormal  24 Abnormal  -- -- 93/57 67 mmHg 93 % 55 --   10/17/22 0600 97 16 °F (36 2 °C) Abnormal  141 Abnormal  24 Abnormal  -- -- 93/57 67 mmHg 94 % -- --   10/17/22 0530 96 98 °F (36 1 °C) Abnormal  142 Abnormal  24 Abnormal  -- -- 97/59 69 mmHg 91 % -- --   10/17/22 0515 96 98 °F (36 1 °C) Abnormal  144 Abnormal  24 Abnormal  -- -- 92/58 68 mmHg 90 % -- --   10/17/22 0500 96 8 °F (36 °C) Abnormal  140 Abnormal  24 Abnormal  -- -- 89/55 65 mmHg 91 % -- --   10/17/22 0456 -- -- -- -- -- -- -- 90 % -- --   10/17/22 0445 96 8 °F (36 °C) Abnormal  135 Abnormal  22 -- -- 80/54 62 mmHg Abnormal  94 % 55 Ventilator   10/17/22 0430 96 8 °F (36 °C) Abnormal  144 Abnormal  18 -- -- 96/63 73 mmHg 93 % -- --   10/17/22 0415 96 8 °F (36 °C) Abnormal  143 Abnormal  22 -- -- 94/58 68 mmHg 94 % -- --   10/17/22 0400 96 62 °F (35 9 °C) Abnormal  142 Abnormal  31 Abnormal  -- -- 90/57 67 mmHg 83 % Abnormal  -- --   10/17/22 0345 96 62 °F (35 9 °C) Abnormal  141 Abnormal  29 Abnormal  -- -- 101/63 73 mmHg 98 % -- --   10/17/22 0330 96 44 °F (35 8 °C) Abnormal  136 Abnormal  26 Abnormal  -- -- 102/61 72 mmHg 92 % -- --   10/17/22 0315 96 44 °F (35 8 °C) Abnormal  137 Abnormal  23 Abnormal  -- -- 102/61 72 mmHg 93 % -- Ventilator   10/17/22 0300 96 26 °F (35 7 °C) Abnormal  136 Abnormal  24 Abnormal  -- -- 86/53 62 mmHg Abnormal  100 % -- --   10/17/22 0245 96 26 °F (35 7 °C) Abnormal  136 Abnormal  18 -- -- 96/58 68 mmHg 96 % -- --   10/17/22 0230 96 26 °F (35 7 °C) Abnormal  130 Abnormal  18 -- -- 123/71 84 mmHg 97 % -- --   10/17/22 0215 96 26 °F (35 7 °C) Abnormal  124 Abnormal  18 -- -- 140/76 92 mmHg 99 % 55 Ventilator   10/17/22 0200 96 44 °F (35 8 °C) Abnormal  126 Abnormal  18 -- -- 128/72 86 mmHg 99 % -- Ventilator   10/17/22 0145 96 44 °F (35 8 °C) Abnormal  144 Abnormal  18 -- -- 107/62 73 mmHg 99 % 55 Ventilator   10/17/22 0130 96 26 °F (35 7 °C) Abnormal  137 Abnormal  18 -- -- 105/62 73 mmHg 95 % -- Ventilator   10/17/22 0115 96 26 °F (35 7 °C) Abnormal  135 Abnormal  18 -- -- 96/59 69 mmHg 99 % -- Ventilator   10/17/22 0100 96 08 °F (35 6 °C) Abnormal  124 Abnormal  18 -- -- 127/73 86 mmHg 98 % -- Ventilator   10/17/22 0045 96 08 °F (35 6 °C) Abnormal  134 Abnormal  18 -- -- 115/68 80 mmHg 97 % -- Ventilator   10/17/22 0030 96 08 °F (35 6 °C) Abnormal  131 Abnormal  18 -- -- 109/65 77 mmHg 93 % -- Ventilator   10/17/22 0015 95 9 °F (35 5 °C) Abnormal  127 Abnormal  18 -- -- 118/66 79 mmHg 92 % -- Ventilator   10/17/22 0000 95 9 °F (35 5 °C) Abnormal  130 Abnormal  18 -- -- 98/58 70 mmHg 93 % -- Ventilator   10/16/22 2345 95 72 °F (35 4 °C) Abnormal  138 Abnormal  18 -- -- 95/57 68 mmHg 93 % -- Ventilator   10/16/22 2330 95 72 °F (35 4 °C) Abnormal  146 Abnormal  18 -- -- 98/54 66 mmHg 94 % -- Ventilator   10/16/22 2315 95 54 °F (35 3 °C) Abnormal  135 Abnormal  34 Abnormal  -- -- 105/60 72 mmHg 94 % -- Ventilator   10/16/22 2300 95 54 °F (35 3 °C) Abnormal  121 Abnormal  18 -- -- 117/65 79 mmHg 95 % -- Ventilator   10/16/22 2245 95 54 °F (35 3 °C) Abnormal  120 Abnormal  18 -- -- 132/70 86 mmHg 100 % -- Ventilator   10/16/22 2230 95 54 °F (35 3 °C) Abnormal  122 Abnormal  18 -- -- 120/68 83 mmHg 100 % -- Ventilator   10/16/22 2215 -- 116 Abnormal  20 -- -- 147/78 97 mmHg 100 % -- Ventilator   10/16/22 2200 -- 154 Abnormal  18 109/70 84 100/55 68 mmHg 99 %       Date and Time Eye Opening Best Verbal Response Best Motor Response Ramon Coma Scale Score   10/17/22 0800 3 1 4 8   10/17/22 0430 1 3 4 8   10/17/22 0100 1 3 4 8   10/17/22 0000 1 3 4 8   10/16/22 2200 1 3 4 8     Date and Time R Pupil Size (mm) L Pupil Size (mm) R Pupil Reaction L Pupil Reaction   10/17/22 0800 1 1 Brisk Brisk   10/17/22 0430 1 1 Brisk Brisk   10/17/22 0100 1 1 Brisk Brisk   10/17/22 0000 1 1 Brisk Brisk   10/16/22 2200 1 1 Brisk Brisk         Pertinent Labs/Diagnostic Test Results:     10/16 ECG- ECG rate:  91    ECG rate assessment: normal    Rhythm:     Rhythm: atrial fibrillation    Ectopy:     Ectopy: PVCs      PVCs:  Frequent  QRS:     QRS axis:  Normal    QRS intervals:  Wide  Conduction:     Conduction: abnormal      Abnormal conduction: complete RBBB    ST segments:     ST segments:  Normal  T waves:     T waves: inverted    XR chest portable ICU   Final Result by Aimee Brown MD (10/17 4300)      No acute cardiopulmonary disease  Workstation performed: [id number]         XR chest portable ICU   Final Result by Christo Galeana MD (65/88 9660)      Hazy left parahilar infiltrate appearing stable  Cardiomegaly  Lines and tubes stable  Workstation performed: [id number]         10/16 cardiac US-Interpretation:     Fluid Status:  Euvolemic     Unable to visualize IVC/obtained subxiphoid view secondary to body habitus  Limited apical four-chamber view obtained secondary to body habitus  Technically difficult study secondary to mechanical ventilation and body habitus    Patient with significantly reduced ejection fraction on visual estimation, EPSS on 15mm indicating significant systolic dysfunction         Results from last 7 days   Lab Units 10/17/22  0806 10/16/22  2121 10/16/22  2118 10/16/22  1904 10/16/22  1724   WBC Thousand/uL 15 75* 5 27  --   --  5 79   HEMOGLOBIN g/dL 10 0* 13 8  --   --  9 8*   I STAT HEMOGLOBIN g/dl  --   --  11 6* 16 0  --    HEMATOCRIT % 32 3* 46 8  --   --  32 8*   HEMATOCRIT, ISTAT %  --   --  34* 47  --    PLATELETS Thousands/uL 159 96*  --   --  147*   NEUTROS ABS Thousands/µL  --  4 17  --   --   --    BANDS PCT % 4  --   --   --   --          Results from last 7 days   Lab Units 10/17/22  0806 10/17/22  0521 10/17/22  0203 10/16/22  2121 10/16/22  2118 10/16/22  1904 10/16/22  1904 10/16/22  1724   SODIUM mmol/L 134* 134* 136 134*  --   --   --  133*   POTASSIUM mmol/L 4 3 3 6 2 9* 3 3*  --   --   --  4 3   CHLORIDE mmol/L 90* 88* 84* 81*  --   --   --  77*   CO2 mmol/L 30 31 29 39*  --   --   --  >45*   CO2, I-STAT mmol/L  --   --   --   --  >45*   < > >45*  --    ANION GAP mmol/L 14* 15* 23* 14*  --   --   --   --    BUN mg/dL 26* 25 24 22  --   --   --  20   CREATININE mg/dL 1 07 1 01 0 97 0 98  --   --   --  1 01   EGFR ml/min/1 73sq m 79 85 90 88  --   --   --  85   CALCIUM mg/dL 8 2* 8 2* 8 3* 7 7*  --   --   --  8 8   CALCIUM, IONIZED mmol/L 0 99*  --  1 04*  --   --   --   --   --    CALCIUM, IONIZED, ISTAT mmol/L  --   --   --   --  0 92*  --  0 94*  --    MAGNESIUM mg/dL 2 4 2 5 2 0 3 4*  --   --   --   --    PHOSPHORUS mg/dL  --   --   --  5 3*  --   --   --   --     < > = values in this interval not displayed       Results from last 7 days   Lab Units 10/16/22  2121 10/16/22  1724   AST U/L 84* 50*   ALT U/L 33 23   ALK PHOS U/L 191* 168*   TOTAL PROTEIN g/dL 7 0 7 7   ALBUMIN g/dL 2 6* 2 9*   TOTAL BILIRUBIN mg/dL 4 31* 4 67*     Results from last 7 days   Lab Units 10/17/22  0807 10/17/22  0630 10/17/22  0346 10/17/22  0222 10/17/22  0010 10/16/22  1710   POC GLUCOSE mg/dl 113 149* 209* 258* 281* 150*     Results from last 7 days   Lab Units 10/17/22  0806 10/17/22  0521 10/17/22  0203 10/16/22  2121 10/16/22  1724 GLUCOSE RANDOM mg/dL 122 175* 257* 240* 161*             No results found for: BETA-HYDROXYBUTYRATE   Results from last 7 days   Lab Units 10/17/22  0520 10/17/22  0204 10/16/22  2324   PH ART  7 348* 7 405 7  441   PCO2 ART mm Hg 61 2* 50 6* 56 5*   PO2 ART mm Hg 66 4* 182 8* 68 7*   HCO3 ART mmol/L 32 9* 31 0* 37 6*   BASE EXC ART mmol/L 5 4 5 3 11 7   O2 CONTENT ART mL/dL 17 4 15 5* 14 1*   O2 HGB, ARTERIAL % 89 5* 98 2* 92 5*   ABG SOURCE  Line, Arterial Line, Arterial Line, Arterial     Results from last 7 days   Lab Units 10/17/22  0745 10/17/22  0521 10/17/22  0208   PH TRACI  7 367 7 294* 7 354   PCO2 TRACI mm Hg 55 6* 67 2* 63 4*   PO2 TRACI mm Hg 29 0* 28 6* 38 6   HCO3 TRACI mmol/L 31 2* 31 9* 34 5*   BASE EXC TRACI mmol/L 4 8 3 0 7 4   O2 CONTENT TRACI ml/dL 7 3 9 4 9 9   O2 HGB, VENOUS % 46 5* 42 9* 67 1     Results from last 7 days   Lab Units 10/16/22  2118 10/16/22  1904   PH, TRACI I-STAT  7 348  --    PCO2, TRACI ISTAT mm HG 87 8*  --    PO2, TRACI ISTAT mm HG 37 0  --    HCO3, TRACI ISTAT mmol/L 48 2*  --    I STAT BASE EXC mmol/L 19* 21*   I STAT O2 SAT % 63  --    ISTAT PH ART   --  7 359   I STAT ART PCO2 mm HG  --  95 2*   I STAT ART PO2 mm HG  --  >400 0*   I STAT ART HCO3 mmol/L  --  53 6*     Results from last 7 days   Lab Units 10/16/22  2121   CK TOTAL U/L 20*     Results from last 7 days   Lab Units 10/17/22  0024 10/16/22  2237   HS TNI 0HR ng/L  --  95*   HS TNI 2HR ng/L 180*  --    HSTNI D2 ng/L 85*  --          Results from last 7 days   Lab Units 10/17/22  0521 10/16/22  2121 10/16/22  1846   PROTIME seconds  --  15 5* 15 1*   INR   --  1 20* 1 16   PTT seconds 71*  --  30     Results from last 7 days   Lab Units 10/16/22  1724   TSH 3RD GENERATON uIU/mL 4 214     Results from last 7 days   Lab Units 10/17/22  0024 10/16/22  1724   PROCALCITONIN ng/ml 0 80* 0 36*     Results from last 7 days   Lab Units 10/17/22  0745 10/17/22  0521 10/17/22  0203 10/17/22  0024 10/16/22  2121 10/16/22  1724   LACTIC ACID mmol/L 7 8* 8 5* 7 8* 7 7* 3 9* 1 7         Results from last 7 days   Lab Units 10/16/22  2121   DIGOXIN LVL ng/mL <0 5*     Results from last 7 days   Lab Units 10/16/22  2147   BNP pg/mL 89                             Results from last 7 days   Lab Units 10/16/22  2122   CLARITY UA  Extra Turbid   COLOR UA  Dark Orange   SPEC GRAV UA  1 026   PH UA  5 5   GLUCOSE UA mg/dl Negative   KETONES UA mg/dl Negative   BLOOD UA  Small*   PROTEIN UA mg/dl 100 (2+)*   NITRITE UA  Negative   BILIRUBIN UA  Moderate*   UROBILINOGEN UA (BE) mg/dl >=12 0*   LEUKOCYTES UA  Large*   WBC UA /hpf Innumerable*   RBC UA /hpf 4-10*   BACTERIA UA /hpf Innumerable*   EPITHELIAL CELLS WET PREP /hpf Occasional                                 Results from last 7 days   Lab Units 10/16/22  2321 10/16/22  2238 10/16/22  1846 10/16/22  1724   BLOOD CULTURE  Received in Microbiology Lab  Culture in Progress  Received in Microbiology Lab  Culture in Progress  Received in Microbiology Lab  Culture in Progress  Received in Microbiology Lab  Culture in Progress                     Past Medical History:   Diagnosis Date   • Afib ([facility] [address] )    • Anemia    • Anxiety    • Cancer [facility])    • Cardiac disease    • Cellulitis    • COPD (chronic obstructive pulmonary disease) ([facility])    • Depression    • Diabetes mellitus ([facility])    • DVT (deep venous thrombosis) ([facility])    • GERD (gastroesophageal reflux disease)    • HBP (high blood pressure)    • Heart failure ([facility])    • Hx of blood clots    • Hypertension    • Hypokalemia    • Hypothyroid    • Obesity    • Psychiatric disorder      Present on Admission:  • Atrial fibrillation with rapid ventricular response ([facility])  • Morbid obesity ([facility])  • Obesity hypoventilation syndrome ([facility])  • Chronic combined systolic and diastolic heart failure ([facility])  • Atrial fibrillation ([facility])  • Enterocutaneous fistula  • Hypothyroidism      Admitting Diagnosis: Altered mental status [R41 82]  Age/Sex: 47 y o  male  Admission Orders:  Scheduled Medications:  cefepime, 2,000 mg, Intravenous, Q12H  chlorhexidine, 15 mL, Mouth/Throat, Q12H [address]  digoxin, 250 mcg, Oral, Daily  EPINEPHrine PF (FOR EMS ONLY), 3 each, Does not apply, Once  hydrocortisone sodium succinate, 50 mg, Intravenous, Q6H MARY  levothyroxine, 25 mcg, Oral, Early Morning  LORazepam, 2 mg, Intravenous, Once  pantoprazole, 40 mg, Intravenous, Q24H MARY  vancomycin, 15 mg/kg (Adjusted), Intravenous, Q8H    potassium chloride 40 mEq IVPB (premix)  Dose: 40 mEq  Freq: Once Route: IV  Last Dose: Stopped (10/17/22 0800)  Start: 10/16/22 2245 End: 10/17/22 0800  potassium chloride 40 mEq IVPB (premix)  Dose: 40 mEq  Freq: Every 2 hours scheduled Route: IV  Last Dose: Stopped (10/17/22 0800)  Start: 10/17/22 0400 End: 10/17/22 0800  midazolam (VERSED) injection 2 mg  Dose: 2 mg  Freq: Once Route: IV  Start: 10/17/22 0845 End: 10/17/22 0842  magnesium sulfate 2 g/50 mL IVPB (premix) 2 g  Dose: 2 g  Freq: Once Route: IV  Last Dose: Stopped (10/17/22 0230)  Start: 10/16/22 2345 End: 10/17/22 0230  multi-electrolyte (ISOLYTE-S PH 7 4) bolus 1,000 mL  Dose: 1,000 mL  Freq: Once Route: IV x1 10/16 @2220, x1 10/17 @0148  LORazepam (ATIVAN) 2 mg/mL injection **ADS Override Pull**  Start: 10/16/22 2155 End: 10/16/22 2218  x1 10/16  lactated ringers bolus 500 mL  Dose: 500 mL  Freq: Once Route: IV  Last Dose: Stopped (10/17/22 0759)  Start: 10/17/22 0200 End: 10/17/22 0759  hydrocortisone (Solu-CORTEF) injection 100 mg  Dose: 100 mg  Freq: Once Route: IV  Start: 10/17/22 0300 End: 10/17/22 0330  calcium gluconate 2 g in sodium chloride 0 9% 100 mL (premix)  Dose: 2 g  Freq: Once Route: IV  Last Dose: 2 g (10/17/22 0922)  Start: 10/17/22 0915 End: 10/17/22 1022  amiodarone 150 mg in dextrose 5 % 100 mL IV bolus  Dose: 150 mg  Freq:  Once Route: IV x1 10/16 @2344, x1 10/16 @2231 , x1 10/16 @2202      Continuous IV Infusions:  amiodarone, 0 5 mg/min, Intravenous, Continuous  epinephrine, 1-10 mcg/min, Intravenous, Titrated  fentaNYL, 50 mcg/hr, Intravenous, Continuous  heparin (porcine), 3-20 Units/kg/hr (Order-Specific), Intravenous, Titrated  insulin regular (HumuLIN R,NovoLIN R) infusion, 0 3-21 Units/hr, Intravenous, Titrated  norepinephrine, 1-30 mcg/min, Intravenous, Titrated  phenylephine,  mcg/min, Intravenous, Titrated  vasopressin (PITRESSIN) in 0 9 % sodium chloride 100 mL, 0 04 Units/min, Intravenous, Continuous    milrinone (PRIMACOR) 20 mg in 100 mL infusion (premix)  Rate: 13 3 mL/hr Dose: 0 25 mcg/kg/min  Weight Dosing Info: 177 kg  Freq: Continuous Route: IV  Last Dose: Stopped (10/17/22 1027)  Start: 10/17/22 0900 End: 10/17/22 1027  PRN Meds:  fentanyl citrate (PF), 25 mcg, Intravenous, Q1H PRN x1 10/16, x1 10/17    mechanical ventilation   daily awakening trial   wean and extubate -Mode:100% tube compensation preferred or Pressure Support 5 / PEEP 6, unless directed otherwise by provider  FiO2: <60%  PEEP: 6-8   cardiopulm monitoring   NPO   neuro checks    IP CONSULT TO CASE MANAGEMENT  IP CONSULT TO PHARMACY    Network Utilization Review Department  ATTENTION: Please call with any questions or concerns to [phone] and carefully listen to the prompts so that you are directed to the right person  All voicemails are confidential   Eli Francis all requests for admission clinical reviews, approved or denied determinations and any other requests to dedicated fax number below belonging to the campus where the patient is receiving treatment   List of dedicated fax numbers for the Facilities:  [address] DENIALS (Administrative/Medical Necessity) [phone]   [address] (Maternity/NICU/Pediatrics) [phone]   7 Veronica Nagy [phone]   [facility] [phone]   [place] [phone]   [facility] [address] [facility] [phone]   [address] [facility] [address] [facility] [address] [phone]

## 2022-10-17 NOTE — PROCEDURES
POC Cardiac US    Date/Time: 10/16/2022 11:00 PM  Performed by: Samantha Luis  Authorized by: Samantha Luis     Patient location:  ICU  Other Assisting Provider: No    Procedure details:     Exam Type:  Diagnostic    Indications: hypotension, suspected volume depletion and cardiac arrest      Assessment / Evaluation for: cardiac function, pericardial effusion and intravascular volume status      Exam Type: initial exam      Image quality: limited diagnostic      Image availability:  Images available in PACS and video obtained  Patient Details:     Cardiac Rhythm:  Irregular    Medications: norepinephrine infusion, vasopressin and epinephrine      Mechanical ventilation: Yes    Cardiac findings:     Echo technique: M-mode      Views obtained: parasternal long axis, parasternal short axis and apical      Views obtained comment:  Limited A4C, unable to obtain subcostall    Pericardial effusion: absent      Pericardial effusion comment:  Pericardial fat pad    Tamponade physiology: absent      Wall motion: hypodynamic      Wall motion comment:  Diffusely hypokinetic with poor visualized fractional area change    LV systolic function: normal    Pulmonary findings:     Left Lung Findings: left lung sliding      Right lung findings: right lung sliding      B-lines: 1 to 3    Interpretation:     Fluid Status:  Euvolemic     Unable to visualize IVC/obtained subxiphoid view secondary to body habitus  Limited apical four-chamber view obtained secondary to body habitus  Technically difficult study secondary to mechanical ventilation and body habitus  Patient with significantly reduced ejection fraction on visual estimation, EPSS on 15mm indicating significant systolic dysfunction

## 2022-10-17 NOTE — ASSESSMENT & PLAN NOTE
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

## 2022-10-17 NOTE — ASSESSMENT & PLAN NOTE
Patient has a history of atrial fibrillation, as on digoxin, metoprolol and dabigatran as outpatient  He was noted for AFib with aberrancy during triage in the ICU, subsequent to which he received magnesium IV 2g with increasing epinephrine gtt rate, in addition to 150 mg bolus of amiodarone  Continue cardiopulmonary monitoring  Continue digoxin; will obtain digitoxin level  Will provide heparin gtt    Will obtain 2D echocardiogram

## 2022-10-17 NOTE — ASSESSMENT & PLAN NOTE
BMI of 55, with multiple sequelae including OHS, hypertension  Consult appropriate lifestyle modifications for attainment of target BMI

## 2022-10-17 NOTE — PROGRESS NOTES
Daily Progress Note - Critical Care   Avinash Dietrich 47 y o  male MRN: [id number]  Unit/Bed#: ICU 06 Encounter: [id number]        ----------------------------------------------------------------------------------------  HPI:  55-year-old male with a past medical history significant for morbid obesity, ohs, FILIPPO, AFib, hypothyroidism, hypertension, CHF who presented from [address] [place] [address] (10/16) with a chief complaint of altered mental status  At nursing facility the patient was found to be minimally responsive (only to noxious stimuli), hypotensive, and in respiratory distress  Patient was sent to [facility] ED, for which patient was noted to be in respiratory distress with an ABG showing pH 7 36, pCO2 96 5, bicarb 54  Due to the patient's worsening respiratory status, he was intubated and placed on mechanical ventilation (AC/VC); received fentanyl and ketamine for sedation  Patient has centralized in place in the right jugular vein for access  Patient's workup in the ED was unremarkable for infectious etiologies including absent leukocytosis, lactic acid below 2 0, but there was a Procalcitonin level of 0 36; cbc and electrolytes were WNL at that time  Imaging studies done include CT head (no acute intracranial abnormality); However, the patient's EKG showed AFib multiple PVCs and a RBBB  Shortly after, the patient went into cardiac arrest, requiring CPR (2 rounds),given epinephrine x1, and achieved ROSC; pt was intubated during CPR (without pausing for compressions), and started on levophed drip  A central line was placed in the right internal jugular vein, and started on antibiotics (IV cefepime and IV vancomycin D#1)    Patient was deemed in need of further management and was transported to [facility]     ----------------------------------------------------------------------------------------  24hr events:   · Epi at 8 this AM, Levo at 30, Vaso 0 04, Insulin gtt 4 Units/hr, heparin 11 1 units/kg/hr, fentanyl 50 mcg/hr, amiodarone 0 5 mg/min   · Lactic acid increased to 8 5 after IVF (initial LA 1 7)  · Bedside POCUS: hypodynamic LV   · Seizure-like activity given ativan 2 mg, no further seizures    · Minimal UOP  · Failed cardioversion this AM   ---------------------------------------------------------------------------------------  SUBJECTIVE  Patient intubated  Review of Systems   Unable to perform ROS: Intubated     Review of systems was reviewed and negative unless stated above in HPI/24-hour events   ---------------------------------------------------------------------------------------  Assessment and Plan    Principal Problem:    Cardiac arrest [facility])  Active Problems:    Atrial fibrillation with rapid ventricular response (HCC)    Morbid obesity ([facility] )    Obesity hypoventilation syndrome (HCC)    Chronic combined systolic and diastolic heart failure (HCC)    Atrial fibrillation (HCC)    Hypothyroidism    History of DVT (deep vein thrombosis)    Enterocutaneous fistula    Seizure-like activity ([facility] )    Shock ([facility] )  Resolved Problems:    * No resolved hospital problems  *        Problem List:  1  Cardiogenic vs Distributive shock   2  Cardiac arrest  3  Hypoxic hypercapnic respiratory failure ventilator dependent  4  OHS  5  Afib with RVR  6  Seizure like activity  7  CHF   8  Hx of SBO with enterocutaneous fistula   9   Morbid obesity       Neuro:   • Diagnosis: Intubated/on mechanical ventilation   o RASS goal: 0 to -1 ; current RASS:-2; ICAM-ICU: N/A  o AC/PC: rate 2, IP 25, PEEP 10, FiO2 55%; sedation with fentanyl 50 mcg/hr (decreased from 100)  o Following commands, not in pain   o Plan: Continue on vent, wean as tolerated, neuro checks   • Diagnosis: Seizure-like activity  o Braulio's paralysis and low frequency convulsions, received 2 mg Ativan (at 22:19)   o Plan: Monitor for ictal activity, consider EEG, ativan for seizures     CV:   • Diagnosis: Cardiogenic vs distributive shock o Hypothermic, hypotensive, tachypneic, tachycardic,   o Procal 0 8 (increased from 0 36); IV ABx, aggressively fluid resuscitated (total 4 5 L) but lactic acid increased to 8 this AM after IVF administration; blood and urine cultures obtained   o Plan: CT chest/abd/pelvis, continue  IVF, continue on pressors, follow up cultures, monitor temperature/vital signs; continue stress dose steroids   • Diagnosis: CHF  o ECHO (04/2022): EF 55%; home med regimen: spironolactone and torsemide; BNP WNL   o POCUS (10/17/2022): Fluid status: euvolemic [unable to visualize IVC]  o Plan: Continue off diuretics, monitor vs, continue shock management as above  • Diagnosis:  Afib with RVR  o On Max pressors and amiodarone, HR remains tachycardic (avg 148)   o Amiodarone gtt, (4 boluses)  o Plan: Consider cardioversion      Pulm:  • Diagnosis: Hypoxic hypercapnic respiratory failure ventilator dependent   o AC/PC rate 2, IP 25, PEEP 10, FiO2 55%;  Tidal volume: 490s  o Sedation with Fentanyl 50 mcg/hr  o   o Plan:  Wean as appropriate/per RT      GI:   • Diagnosis: H/o SBO with enterocutaneous fistula development   o Plan: continue to monitor/keep area clean, NPO    :   • Diagnosis: AGMA respiratory acidosis   o Plan: continue altering vent settings; baseline PCO2 in 50's (from previous admission); monitor UOP    F/E/N:   • Fluids: Off IVF, lactic acid increasing after fluid administration   • Electrolytes: Replete as needed   • Nutrition: NPO      Heme/Onc:   • Diagnosis: DVT prophylaxis   o Plan: continue heparin AC drip, monitor PTT, platelets, follow up CBC    Endo:   • Diagnosis: No acute process  o Plan: goal BG < 180   o       ID:   • Diagnosis: Cardiogenic vs Distributive shock   o Temperature: hypothermic, tachycardic, tachypneic, but NO leukocytosis, Procal elevated x 2   o ABx D#2 (IV vancomycin and IV cefepime)  o Plan: Continue antibiotics     MSK/Skin:   • Diagnosis: Wound present on back;   o Plan: follow up Wound care     Patient appropriate for transfer out of the ICU today?: No  Disposition: Continue Critical Care   Code Status: Level 1 - Full Code  ---------------------------------------------------------------------------------------  ICU CORE MEASURES    Prophylaxis   VTE Pharmacologic Prophylaxis: Heparin Drip  VTE Mechanical Prophylaxis: sequential compression device  Stress Ulcer Prophylaxis: Prophylaxis Not Indicated     ABCDE Protocol (if indicated)  Plan to perform spontaneous awakening trial today? No  Plan to perform spontaneous breathing trial today? Not applicable  Obvious barriers to extubation? No  CAM-ICU: Negative    Invasive Devices Review  Invasive Devices  Report    Central Venous Catheter Line  Duration           CVC Central Lines 10/16/22 Triple Right Subclavian <1 day          Peripheral Intravenous Line  Duration           Peripheral IV 22 Left;Upper Arm 37 days    Peripheral IV 10/16/22 Right Antecubital <1 day          Intraosseous Line  Duration           Intraosseous Line 10/16/22 Tibia <1 day          Arterial Line  Duration           Arterial Line 10/16/22 Radial <1 day          Drain  Duration           Open Drain Medial RUQ 94 days    Urethral Catheter 1 day    NG/OG/Enteral Tube Orogastric 14 Fr Left mouth <1 day    Urethral Catheter <1 day          Airway  Duration           ETT  8 mm <1 day              Can any invasive devices be discontinued today?  No  ---------------------------------------------------------------------------------------  OBJECTIVE    Vitals   Vitals:    10/17/22 0400 10/17/22 0456 10/17/22 0500 10/17/22 0543   BP:       Pulse: (!) 142  (!) 140    Resp: (!) 31  (!) 24    Temp: (!) 96 62 °F (35 9 °C)  (!) 96 8 °F (36 °C)    TempSrc: Probe      SpO2:  90% 91%    Weight: (!) 178 kg (391 lb 12 1 oz)   (!) 178 kg (391 lb 12 1 oz)   Height: 5' 6" (1 676 m)        Temp (24hrs), Av 3 °F (35 7 °C), Min:95 54 °F (35 3 °C), Max:96 8 °F (36 °C)  Current: Temperature: (!) 96 8 °F (36 °C)  Temp (24hrs), Av 3 °F (35 7 °C), Min:95 54 °F (35 3 °C), Max:96 8 °F (36 °C)    Temp:  [95 54 °F (35 3 °C)-97 34 °F (36 3 °C)] 97 34 °F (36 3 °C)  HR:  [] 148  Resp:  [12-34] 30  BP: ()/(38-82) 78/52  SpO2:  [83 %-100 %] 97 %  Body mass index is 63 23 kg/m²  Respiratory:  SpO2: SpO2: 91 %, SpO2 Activity: SpO2 Activity: At Rest, SpO2 Device: O2 Device: Ventilator, Capnography:         Invasive/non-invasive ventilation settings   Respiratory  Report   Lab Data (Last 4 hours)      10/17 0520            pH, Arterial       7 348             pCO2, Arterial       61 2             pO2, Arterial       66 4             HCO3, Arterial       32 9             Base Excess, Arterial       5 4                  O2/Vent Data       10/17 0456   Most Recent         Vent Mode AC/PC  AC/PC      Resp Rate (BPM) (BPM)   18      Vt (mL) (mL)   500      FIO2 (%) (%)   55      PEEP (cmH2O) (cmH2O)   8      MV   10 3      Resp Rate (BPM) (BPM) 24  24      Pressure Control (cmH2O) (cm) 25  25      Insp Time (sec) (sec) 0 8  0 8      FiO2 (%) (%) 55  55      PEEP (cmH2O) (cmH2O) 10  10      MV 9 2  9 2                  Physical Exam  Constitutional:       Appearance: He is obese  Interventions: He is sedated and intubated  HENT:      Head: Normocephalic and atraumatic  Mouth/Throat:      Mouth: Mucous membranes are moist    Eyes:      Conjunctiva/sclera: Conjunctivae normal    Cardiovascular:      Rate and Rhythm: Tachycardia present  Rhythm irregular  Comments: Central line- triple-lumen placed on right side   Pulmonary:      Effort: He is intubated  Breath sounds: Decreased breath sounds present  Abdominal:      Palpations: Abdomen is soft  Comments: Enterocutaneous fistula with bag draining brown colored fluid    Musculoskeletal:      Right lower leg: No edema  Left lower leg: No edema  Skin:     General: Skin is warm and dry               Laboratory and Diagnostics:  Results from last 7 days   Lab Units 10/16/22  2121 10/16/22  2118 10/16/22  1904 10/16/22  1724   WBC Thousand/uL 5 27  --   --  5 79   HEMOGLOBIN g/dL 13 8  --   --  9 8*   I STAT HEMOGLOBIN g/dl  --  11 6* 16 0  --    HEMATOCRIT % 46 8  --   --  32 8*   HEMATOCRIT, ISTAT %  --  34* 47  --    PLATELETS Thousands/uL 96*  --   --  147*   NEUTROS PCT % 78*  --   --   --    MONOS PCT % 5  --   --   --    MONO PCT %  --   --   --  2*     Results from last 7 days   Lab Units 10/17/22  0521 10/17/22  0203 10/16/22  2121 10/16/22  2118 10/16/22  1904 10/16/22  1724   SODIUM mmol/L 134* 136 134*  --   --  133*   POTASSIUM mmol/L 3 6 2 9* 3 3*  --   --  4 3   CHLORIDE mmol/L 88* 84* 81*  --   --  77*   CO2 mmol/L 31 29 39*  --   --  >45*   CO2, I-STAT mmol/L  --   --   --  >45* >45*  --    ANION GAP mmol/L 15* 23* 14*  --   --   --    BUN mg/dL 25 24 22  --   --  20   CREATININE mg/dL 1 01 0 97 0 98  --   --  1 01   CALCIUM mg/dL 8 2* 8 3* 7 7*  --   --  8 8   GLUCOSE RANDOM mg/dL 175* 257* 240*  --   --  161*   ALT U/L  --   --  33  --   --  23   AST U/L  --   --  84*  --   --  50*   ALK PHOS U/L  --   --  191*  --   --  168*   ALBUMIN g/dL  --   --  2 6*  --   --  2 9*   TOTAL BILIRUBIN mg/dL  --   --  4 31*  --   --  4 67*     Results from last 7 days   Lab Units 10/17/22  0521 10/17/22  0203 10/16/22  2121   MAGNESIUM mg/dL 2 5 2 0 3 4*   PHOSPHORUS mg/dL  --   --  5 3*      Results from last 7 days   Lab Units 10/16/22  2121 10/16/22  1846   INR  1 20* 1 16   PTT seconds  --  30          Results from last 7 days   Lab Units 10/17/22  0521 10/17/22  0203 10/17/22  0024 10/16/22  2121 10/16/22  1724   LACTIC ACID mmol/L 8 5* 7 8* 7 7* 3 9* 1 7     ABG:  Results from last 7 days   Lab Units 10/17/22  0520   PH ART  7 348*   PCO2 ART mm Hg 61 2*   PO2 ART mm Hg 66 4*   HCO3 ART mmol/L 32 9*   BASE EXC ART mmol/L 5 4   ABG SOURCE  Line, Arterial     VBG:  Results from last 7 days   Lab Units 10/17/22  0521 10/17/22  0520   PH TRACI  7 294*  --    PCO2 TRACI mm Hg 67 2*  --    PO2 TRACI mm Hg 28 6*  --    HCO3 TRACI mmol/L 31 9*  --    BASE EXC TRACI mmol/L 3 0  --    ABG SOURCE   --  Line, Arterial     Results from last 7 days   Lab Units 10/17/22  0024 10/16/22  1724   PROCALCITONIN ng/ml 0 80* 0 36*       Micro  Results from last 7 days   Lab Units 10/16/22  1846 10/16/22  1724   BLOOD CULTURE  Received in Microbiology Lab  Culture in Progress  Received in Microbiology Lab  Culture in Progress  Intake and Output  I/O       10/15 0701  10/16 0700 10/16 0701  10/17 0700    I V  (mL/kg)  3147 8 (17 7)    IV Piggyback  1334 2    Total Intake(mL/kg)  4482 (25 2)    Urine (mL/kg/hr)  340    Emesis/NG output  0    Total Output  340    Net  +4142                Height and Weights   Height: 5' 6" (167 6 cm)  IBW (Ideal Body Weight): 63 8 kg  Body mass index is 63 23 kg/m²  Weight (last 2 days)     Date/Time Weight    10/17/22 0543 178 (391 76)    10/17/22 0400 178 (391 76)            Nutrition       Diet Orders   (From admission, onward)             Start     Ordered    10/16/22 2102  Diet NPO  Diet effective now        References:    Nutrtion Support Algorithm Enteral vs  Parenteral   Question Answer Comment   Diet Type NPO    RD to adjust diet per protocol?  No        10/16/22 2103              Active Medications  Scheduled Meds:  Current Facility-Administered Medications   Medication Dose Route Frequency Provider Last Rate   • amiodarone  0 5 mg/min Intravenous Continuous Rishabh White MD     • amiodarone          • cefepime  2,000 mg Intravenous Q12H Rishabh White MD 2,000 mg (10/16/22 7260)   • chlorhexidine  15 mL Mouth/Throat Q12H [address] Rishabh White MD     • digoxin  250 mcg Oral Daily Rishabh White MD     • epinephrine  1-10 mcg/min Intravenous Titrated Rishabh White MD 7 mcg/min (10/17/22 0527)   • EPINEPHrine PF (FOR EMS ONLY)  3 each Does not apply Once RADHA Camp     • fentaNYL  50 mcg/hr Intravenous Continuous Aimee Kush 50 mcg/hr (10/17/22 0407)   • fentanyl citrate (PF)  25 mcg Intravenous Q1H PRN Eileen Coyne MD     • heparin (porcine)  3-20 Units/kg/hr (Order-Specific) Intravenous Titrated Eileen Coyne MD 11 1 Units/kg/hr (10/16/22 2327)   • hydrocortisone sodium succinate  50 mg Intravenous Q6H [address] Delisa Quails, CRNP     • insulin regular (HumuLIN R,NovoLIN R) infusion  0 3-21 Units/hr Intravenous Titrated Eileen Coyne MD 4 Units/hr (10/17/22 8373)   • levothyroxine  25 mcg Oral Early Morning Eileen Coyne MD     • LORazepam  2 mg Intravenous Once Eileen Coyne MD     • norepinephrine  1-30 mcg/min Intravenous Titrated Eileen Coyne MD 30 mcg/min (10/17/22 0528)   • pantoprazole  40 mg Intravenous Q24H [address] Eileen Coyne MD     • potassium chloride  40 mEq Intravenous Q2H Eileen Coyne MD 40 mEq (10/17/22 0536)   • vancomycin  15 mg/kg (Adjusted) Intravenous Q8H Eileen Coyne MD     • vasopressin (PITRESSIN) in 0 9 % sodium chloride 100 mL  0 04 Units/min Intravenous Continuous Delisa Quails, CRNP 0 04 Units/min (10/17/22 0300)     Continuous Infusions:  amiodarone, 0 5 mg/min  epinephrine, 1-10 mcg/min, Last Rate: 7 mcg/min (10/17/22 0527)  fentaNYL, 50 mcg/hr, Last Rate: 50 mcg/hr (10/17/22 0407)  heparin (porcine), 3-20 Units/kg/hr (Order-Specific), Last Rate: 11 1 Units/kg/hr (10/16/22 2327)  insulin regular (HumuLIN R,NovoLIN R) infusion, 0 3-21 Units/hr, Last Rate: 4 Units/hr (10/17/22 0219)  norepinephrine, 1-30 mcg/min, Last Rate: 30 mcg/min (10/17/22 0528)  vasopressin (PITRESSIN) in 0 9 % sodium chloride 100 mL, 0 04 Units/min, Last Rate: 0 04 Units/min (10/17/22 0300)      PRN Meds:   fentanyl citrate (PF), 25 mcg, Q1H PRN        Allergies   Allergies   Allergen Reactions   • Penicillins Hives     ---------------------------------------------------------------------------------------  Advance Directive and Living Will: Power of :    POLST:    ---------------------------------------------------------------------------------------  Malia Rodgers,       Portions of the record may have been created with voice recognition software  Occasional wrong word or "sound a like" substitutions may have occurred due to the inherent limitations of voice recognition software    Read the chart carefully and recognize, using context, where substitutions have occurred

## 2022-10-17 NOTE — QUICK NOTE
Mr Tang Heart significant other, Ms Gopi Prado, was contacted just prior to this entry, by this author  Ms Partha Klein was informed of the patient's transfer from [place] ED to our ICU, for an escalated level of care, as well as provided with an update regarding the patient's current medical status; all questions and concerns were addressed at this time

## 2022-10-17 NOTE — ASSESSMENT & PLAN NOTE
Patient noted for possible Braulio's paralysis, as well as low frequency convulsions  He received 2 mg of lorazepam   Will continue to monitor for ictal activity

## 2022-10-17 NOTE — ASSESSMENT & PLAN NOTE
Hypotensive on initial presentation, requiring multiple pressors  He additional required a 1 L bolus by slight after management for seizure-like activity  May have a component of cardiogenic origin, given reduced Cardiodynamics on POCUS of chest  No overt signs of sepsis as he is afebrile, no significant leukocytosis and lactate less than 2  Continue pressor therapy with Levophed, epinephrine and vasopressin; titrate to maintain MEP greater than 65 mmHg  Follow-up blood cultures x2  Will initiate empirical antimicrobial coverage with vancomycin and cefepime  Continue to monitor for systemic signs of sepsis

## 2022-10-17 NOTE — ASSESSMENT & PLAN NOTE
Patient is a 25-year-old gentleman with a history of morbid obesity, OHS, FILIPPO, AFib, hypothyroidism, hypertension, CHF who presents from [address] [place] [address] after noted minimally responsive, only to noxious stimuli  He was also noted to be in respiratory distress and hypotensive  He was initially sent to [place] ED, at which time he was noted to be in respiratory distress with ABG showing pH 7 36/pCO2 96 5/bicarb 54  Subsequent to this, patient was intubated and placed on mechanical ventilation on AC/VC  Central line was placed in the right subclavian  Workup for infectious etiologies unremarkable, with no leukocytosis, lactic acid below 2, however procalcitonin 0 36  Workup for cardiac source included an EKG which showed AFib with multiple PVCs and right bundle-branch block  Electrolytes and CBC at that time were unremarkable  While at [place] ED, patient went to cardiac arrest and received epinephrine x1, in addition to amiodarone, with ROSC achieved  He was subsequently transferred to [facility] ICU for further management  Maintain cardiopulmonary monitoring  Place arterial line  Continue pressor support; chronic epinephrine, Levophed, vasopressin gtts-adjust to maintain MAP greater than 60 mm Hg  Will obtain 2D echocardiogram   Obtain BMP  Monitor electrolytes

## 2022-10-17 NOTE — PLAN OF CARE
Problem: SAFETY,RESTRAINT: NV/NON-SELF DESTRUCTIVE BEHAVIOR  Goal: Remains free of harm/injury (restraint for non violent/non self-detsructive behavior)  Description: INTERVENTIONS:  - Instruct patient/family regarding restraint use   - Assess and monitor physiologic and psychological status   - Provide interventions and comfort measures to meet assessed patient needs   - Identify and implement measures to help patient regain control  - Assess readiness for release of restraint   Outcome: Progressing  Goal: Returns to optimal restraint-free functioning  Description: INTERVENTIONS:  - Assess the patient's behavior and symptoms that indicate continued need for restraint  - Identify and implement measures to help patient regain control  - Assess readiness for release of restraint   Outcome: Progressing     Problem: Prexisting or High Potential for Compromised Skin Integrity  Goal: Skin integrity is maintained or improved  Description: INTERVENTIONS:  - Identify patients at risk for skin breakdown  - Assess and monitor skin integrity  - Assess and monitor nutrition and hydration status  - Monitor labs   - Assess for incontinence   - Turn and reposition patient  - Assist with mobility/ambulation  - Relieve pressure over bony prominences  - Avoid friction and shearing  - Provide appropriate hygiene as needed including keeping skin clean and dry  - Evaluate need for skin moisturizer/barrier cream  - Collaborate with interdisciplinary team   - Patient/family teaching  - Consider wound care consult   10/17/2022 1351 by Dayanna Bloom RN  Outcome: Not Progressing  10/17/2022 1350 by Dayanna Bloom RN  Outcome: Progressing     Problem: MOBILITY - ADULT  Goal: Maintain or return to baseline ADL function  Description: INTERVENTIONS:  -  Assess patient's ability to carry out ADLs; assess patient's baseline for ADL function and identify physical deficits which impact ability to perform ADLs (bathing, care of mouth/teeth, toileting, grooming, dressing, etc )  - Assess/evaluate cause of self-care deficits   - Assess range of motion  - Assess patient's mobility; develop plan if impaired  - Assess patient's need for assistive devices and provide as appropriate  - Encourage maximum independence but intervene and supervise when necessary  - Involve family in performance of ADLs  - Assess for home care needs following discharge   - Consider OT consult to assist with ADL evaluation and planning for discharge  - Provide patient education as appropriate  10/17/2022 1350 by Madisyn Espinosa RN  Outcome: Progressing  Goal: Maintains/Returns to pre admission functional level  Description: INTERVENTIONS:  - Perform BMAT or MOVE assessment daily    - Set and communicate daily mobility goal to care team and patient/family/caregiver  - Collaborate with rehabilitation services on mobility goals if consulted  - Perform Range of Motion 3 times a day    - Out of bed for toileting  - Record patient progress and toleration of activity level   10/17/2022 1350 by Madisyn Espinosa RN  Outcome: Progressing     Problem: Potential for Falls  Goal: Patient will remain free of falls  Description: INTERVENTIONS:  - Educate patient/family on patient safety including physical limitations  - Instruct patient to call for assistance with activity   - Consult OT/PT to assist with strengthening/mobility   - Keep Call bell within reach  - Keep bed low and locked with side rails adjusted as appropriate  - Keep care items and personal belongings within reach  - Initiate and maintain comfort rounds  - Make Fall Risk Sign visible to staff  - Initiate/Maintain bed alarm  - Apply yellow socks and bracelet for high fall risk patients  - Consider moving patient to room near nurses station  10/17/2022 1350 by Madisyn Espinosa RN  Outcome: Progressing

## 2022-10-17 NOTE — H&P
[facility]  H&P- [facility] Daub 1970, 47 y o  male MRN: [id number]  Unit/Bed#: ICU 06 Encounter: [id number]  Primary Care Provider: Gustavo Barber MD   Date and time admitted to hospital: 10/16/2022  8:57 PM    * Cardiac arrest [facility])  Assessment & Plan  Patient is a 47 y o  male with a  history of morbid obesity, OHS, FILIPPO, AFib, hypothyroidism, hypertension, CHF who presents from [address] [place] [address] after noted minimally responsive, only to noxious stimuli  He was also noted to be in respiratory distress and hypotensive  He was initially sent to [place] ED, at which time he was noted to be in respiratory distress with ABG showing pH 7 36/pCO2 96 5/bicarb 54  Subsequent to this, patient was intubated and placed on mechanical ventilation on AC/VC  He received fentanyl and ketamine for sedation  Central line was placed in the right jugular  Workup for infectious etiologies unremarkable, with no leukocytosis, lactic acid below 2, however procalcitonin 0 36  Workup for cardiac source included an EKG which showed AFib with multiple PVCs and right bundle-branch block  Electrolytes and CBC at that time were unremarkable  While at [place] ED, patient went to cardiac arrest and received epinephrine x1, in addition to amiodarone, with ROSC achieved  He was subsequently transferred to [facility] ICU for further management  Maintain cardiopulmonary monitoring  Place arterial line  Continue pressor support; epinephrine, Levophed, vasopressin gtts-adjusted to maintain MAP greater than 65 mm Hg  Will obtain 2D echocardiogram   Obtain BNP  Monitor electrolytes  Obesity hypoventilation syndrome ([address] )  Assessment & Plan  Patient history of OHS with baseline pCO2 approximately 50  Initial ABG showed significant elevation in pCO2 to 90  He is currently on AC/VC at tidal volume 500/FiO2 of 45%/rate of 18/peep of 8  Will continue to monitor  Obtain periodic blood gas        Atrial fibrillation with rapid ventricular response [facility])  Assessment & Plan  Patient has a history of atrial fibrillation, as on digoxin, metoprolol and dabigatran as outpatient  He was noted for AFib with aberrancy during triage in the ICU, subsequent to which he received magnesium IV 2g with increasing epinephrine gtt rate, in addition to 150 mg bolus of amiodarone  Continue cardiopulmonary monitoring  Continue digoxin; will obtain digitoxin level  Will provide heparin gtt  Will obtain 2D echocardiogram     Chronic combined systolic and diastolic heart failure [facility])  Assessment & Plan  Wt Readings from Last 3 Encounters:   10/16/22 (!) 186 kg (409 lb 6 3 oz)   09/09/22 (!) 190 kg (419 lb 15 6 oz)   09/12/22 (!) 190 kg (418 lb 14 oz)       Patient outpatient regimen of metoprolol, spironolactone and torsemide  Will hold as patient is currently admitted hypotension receiving pressors  Monitor volume status  Morbid obesity ([facility] )  Assessment & Plan  BMI of 55, with multiple sequelae including OHS, hypertension  Consult appropriate lifestyle modifications for attainment of target BMI  Atrial fibrillation [facility])  Assessment & Plan  See assessment and plan for atrial fibrillation RVR  History of DVT (deep vein thrombosis)  Assessment & Plan  On dabigatran as outpatient  He is currently on heparin gtt  Hypothyroidism  Assessment & Plan  On levothyroxine 25 mcg daily as outpatient  Will obtain TSH  Seizure-like activity [facility])  Assessment & Plan  Patient noted for possible Braulio's paralysis, as well as low frequency convulsions  He received 2 mg of lorazepam   Will continue to monitor for ictal activity  Consider EEG     -------------------------------------------------------------------------------------------------------------  Chief Complaint:  Altered mental status  History of Present Illness   HX and PE limited by:  Sedation    Madan Philippe is a 47 y o  male with a  history of morbid obesity, OHS, FILIPPO, AFib, hypothyroidism, hypertension, CHF who presents from [address] [facility] after noted minimally responsive, only to noxious stimuli  He was also noted to be in respiratory distress and hypotensive  He was initially sent to [place] ED, at which time he was noted to be in respiratory distress with ABG showing pH 7 36/pCO2 96 5/bicarb 54  Subsequent to this, patient was intubated and placed on mechanical ventilation on AC/VC  He received fentanyl and ketamine for sedation  Central line was placed in the right jugular  Workup for infectious etiologies unremarkable, with no leukocytosis, lactic acid below 2, however procalcitonin 0 36  Workup for cardiac source included an EKG which showed AFib with multiple PVCs and right bundle-branch block  Electrolytes and CBC at that time were unremarkable  While at [place] ED, patient went to cardiac arrest and received epinephrine x1, in addition to amiodarone, with ROSC achieved  He was subsequently transferred to [place] ICU for further management  History obtained from chart review  -------------------------------------------------------------------------------------------------------------  Dispo: Continue Critical Care     Code Status: Level 1 - Full Code  --------------------------------------------------------------------------------------------------------------  Review of Systems   Unable to perform ROS: Intubated       Review of systems was unable to be performed secondary to Acuity of condition  Sedation  Physical Exam  Constitutional:       Appearance: He is obese  Interventions: He is sedated and intubated  HENT:      Head: Normocephalic and atraumatic  Mouth/Throat:      Mouth: Mucous membranes are moist    Eyes:      Conjunctiva/sclera: Conjunctivae normal    Cardiovascular:      Rate and Rhythm: Tachycardia present  Rhythm irregular  Comments: Central line- triple-lumen placed at right subclavian vein    Pulmonary: Effort: He is intubated  Breath sounds: Decreased breath sounds present  Abdominal:      Palpations: Abdomen is soft  Comments: Ostomy present  Musculoskeletal:      Right lower leg: No edema  Left lower leg: No edema  Skin:     General: Skin is warm and dry        --------------------------------------------------------------------------------------------------------------  Vitals:   Vitals:    10/16/22 2122   SpO2: 100%     No data recorded        There is no height or weight on file to calculate BMI      Laboratory and Diagnostics:  Results from last 7 days   Lab Units 10/16/22  2121 10/16/22  2118 10/16/22  1904 10/16/22  1724   WBC Thousand/uL 5 27  --   --  5 79   HEMOGLOBIN g/dL 13 8  --   --  9 8*   I STAT HEMOGLOBIN g/dl  --  11 6* 16 0  --    HEMATOCRIT % 46 8  --   --  32 8*   HEMATOCRIT, ISTAT %  --  34* 47  --    PLATELETS Thousands/uL 96*  --   --  147*   NEUTROS PCT % 78*  --   --   --    MONOS PCT % 5  --   --   --    MONO PCT %  --   --   --  2*     Results from last 7 days   Lab Units 10/16/22  2121 10/16/22  2118 10/16/22  1904 10/16/22  1724   SODIUM mmol/L 134*  --   --  133*   POTASSIUM mmol/L 3 3*  --   --  4 3   CHLORIDE mmol/L 81*  --   --  77*   CO2 mmol/L 39*  --   --  >45*   CO2, I-STAT mmol/L  --  >45* >45*  --    ANION GAP mmol/L 14*  --   --   --    BUN mg/dL 22  --   --  20   CREATININE mg/dL 0 98  --   --  1 01   CALCIUM mg/dL 7 7*  --   --  8 8   GLUCOSE RANDOM mg/dL 240*  --   --  161*   ALT U/L 33  --   --  23   AST U/L 84*  --   --  50*   ALK PHOS U/L 191*  --   --  168*   ALBUMIN g/dL 2 6*  --   --  2 9*   TOTAL BILIRUBIN mg/dL 4 31*  --   --  4 67*     Results from last 7 days   Lab Units 10/16/22  2121   MAGNESIUM mg/dL 3 4*   PHOSPHORUS mg/dL 5 3*      Results from last 7 days   Lab Units 10/16/22  2121 10/16/22  1846   INR  1 20* 1 16   PTT seconds  --  30          Results from last 7 days   Lab Units 10/16/22  1724   LACTIC ACID mmol/L 1 7     ABG: VBG:  Results from last 7 days   Lab Units 10/16/22  2117   PH TRACI  7 316   PCO2 TRACI mm Hg 84 5*   PO2 TRACI mm Hg 37 1   HCO3 TRACI mmol/L 42 2*   BASE EXC TRACI mmol/L 12 1     Results from last 7 days   Lab Units 10/16/22  1724   PROCALCITONIN ng/ml 0 36*       Micro:        EKG:  AFib  Multiple PVCs  Right bundle branch block  Imaging: I have personally reviewed pertinent reports          Historical Information   Past Medical History:   Diagnosis Date   • Afib ([facility] )    • Anemia    • Anxiety    • Cancer [facility])    • Cardiac disease    • Cellulitis    • COPD (chronic obstructive pulmonary disease) ([facility])    • Depression    • Diabetes mellitus ([place] [address] )    • DVT (deep venous thrombosis) ([facility])    • GERD (gastroesophageal reflux disease)    • HBP (high blood pressure)    • Heart failure ([facility])    • Hx of blood clots    • Hypertension    • Hypokalemia    • Hypothyroid    • Obesity    • Psychiatric disorder      Past Surgical History:   Procedure Laterality Date   • ABDOMINAL HERNIA REPAIR  05/2019   • CHOLECYSTECTOMY      Lap   • GASTRECTOMY SLEEVE LAPAROSCOPIC  08/2018   • INCISION AND DRAINAGE OF WOUND Bilateral 12/01/2021    Procedure: INCISION AND DRAINAGE (I&D) HEAD/FACE;  Surgeon: Yusef Garcia MD;  Location:  MAIN OR;  Service: General   • IR PICC PLACEMENT DOUBLE LUMEN  06/13/2022   • IVC FILTER INSERTION  2018   • KNEE SURGERY Right     open wound, injury    • LAPAROTOMY N/A 6/14/2022    Procedure: LAPAROTOMY EXPLORATORY, EXTENSIVE LYSIS OF ADHESIONS, APPLICATION OF Jenna Aguirre;  Surgeon: Roshni Bal MD;  Location: [address] [place] [address];  Service: General   • LAPAROTOMY N/A 6/15/2022    Procedure: LAPAROTOMY EXPLORATORY WITH REPAIR OF SEROSAL INJURY;  Surgeon: Verner Hatchet, MD;  Location:  MAIN OR;  Service: General   • LEG SURGERY Right 2009   • SPLIT THICKNESS SKIN GRAFT N/A 6/30/2022    Procedure: incision and drainage, wash out vac change;  Surgeon: Laisha Ewing DO;  Location: [facility]; Service: General   • SPLIT THICKNESS SKIN GRAFT N/A 7/5/2022    Procedure: PREPERATION RECIPIENT STSG SITE, DEBRIDEMENT NONVIABLE SKIN EDGE; PREPERATION RECIPIENT SKIN GRAFT SITE; APPLICATION OF SKIN SUBSTITUTE TO DONOR SITE; APPLICATION WOUND VAC X2 GREATER THAN 50CM; Surgeon: Chantel Haynes DO;  Location: [facility];  Service: General   • US GUIDED VASCULAR ACCESS  08/06/2018   • VAC DRESSING APPLICATION N/A 4/48/0405    Procedure: CHANGE DRESSING/VAC ABDOMEN;  Surgeon: Yoli Figueroa MD;  Location: [facility];  Service: General   • VAC DRESSING APPLICATION N/A 2/64/7595    Procedure: ABDOMINAL WASHOUT, REPAIR OF SEROSAL TEARS,BRIDING VICRYL MESH, PARTIAL CLOSURE, APPLICATION OF WOUND VAC;  Surgeon: Tacho Belcher DO;  Location[facility];  Service: General   • VAC DRESSING APPLICATION N/A 7/37/8471    Procedure: CHANGE DRESSING/VAC ABDOMEN;  Surgeon: Tacho Belcher DO;  [facility];  Service: General     Social History   Social History     Substance and Sexual Activity   Alcohol Use Not Currently     Social History     Substance and Sexual Activity   Drug Use No     Social History     Tobacco Use   Smoking Status Former Smoker   • Packs/day: 1 00   • Years: 15 00   • Pack years: 15 00   Smokeless Tobacco Never Used   Tobacco Comment    1 5ppd x 23 years, quit 2014     Exercise History:  Not obtained    Family History:   Family History   Problem Relation Age of Onset   • Lung cancer Father    • Diabetes Maternal Aunt    • Heart attack Mother    • Clotting disorder Mother    • Hypertension Mother    • Heart failure Mother    • Diabetes Mother    • Atrial fibrillation Mother    • Sleep apnea Mother    • Obesity Mother    • Asthma Sister    • Stroke Neg Hx    • Anuerysm Neg Hx    • Arrhythmia Neg Hx    • Hyperlipidemia Neg Hx         pt unsure   • Fainting Neg Hx      Family history unknown and I have reviewed this patient's family history and commented on sigificant items within the HPI      Medications:  Current Facility-Administered Medications   Medication Dose Route Frequency   • amiodarone 150 mg/3 mL injection **ADS Override Pull**       • chlorhexidine (PERIDEX) 0 12 % oral rinse 15 mL  15 mL Mouth/Throat Q12H [address]   • [START ON 10/17/2022] digoxin (LANOXIN) tablet 250 mcg  250 mcg Oral Daily   • EPINEPHrine PF (FOR EMS ONLY) (ADRENALIN) 1 mg/mL injection 3 mg  3 each Does not apply Once   • fentaNYL 1000 mcg in sodium chloride 0 9% 100mL infusion  100 mcg/hr Intravenous Continuous   • heparin (porcine) 25,000 units in 0 45% NaCl 250 mL infusion (premix)  3-20 Units/kg/hr (Order-Specific) Intravenous Titrated   • [START ON 10/17/2022] levothyroxine tablet 25 mcg  25 mcg Oral Early Morning   • LORazepam (ATIVAN) injection 2 mg  2 mg Intravenous Once   • multi-electrolyte (ISOLYTE-S PH 7 4) bolus 1,000 mL  1,000 mL Intravenous Once   • NOREPINEPHRINE 4 MG  ML NSS (CMPD ORDER) infusion  1-30 mcg/min Intravenous Titrated   • [START ON 10/17/2022] pantoprazole (PROTONIX) injection 40 mg  40 mg Intravenous Q24H [address]   • potassium chloride 40 mEq IVPB (premix)  40 mEq Intravenous Once   • vasopressin (PITRESSIN) 20 Units in sodium chloride 0 9 % 100 mL infusion  0 03 Units/min Intravenous Continuous     Home medications:  Prior to Admission Medications   Prescriptions Last Dose Informant Patient Reported? Taking?    Cholecalciferol (VITAMIN D) 50 MCG (2000 UT) tablet  Outside Facility (Specify) Yes No   Sig: Take 2,000 Units by mouth daily   Potassium Chloride (KCL-20 PO)   Yes No   Sig: Take 20 mg by mouth in the morning   QUEtiapine (SEROquel) 50 mg tablet   No No   Sig: Take 1 tablet (50 mg total) by mouth daily at bedtime   acetaminophen (TYLENOL) 325 mg tablet   No No   Sig: Take 3 tablets (975 mg total) by mouth every 8 (eight) hours   aluminum-magnesium hydroxide-simethicone (MYLANTA) 200-200-20 mg/5 mL suspension   No No   Sig: Take 30 mL by mouth every 6 (six) hours as needed for indigestion or heartburn   ammonium lactate (LAC-HYDRIN) 12 % cream   No No   Sig: Apply topically 2 (two) times a day   buPROPion (FORFIVO XL) 450 MG 24 hr tablet   No No   Sig: Take 1 tablet (450 mg total) by mouth daily   budesonide-formoterol (SYMBICORT) 160-4 5 mcg/act inhaler   Yes No   Sig: Inhale 2 puffs 2 (two) times a day Rinse mouth after use     cyanocobalamin (VITAMIN B-12) 1000 MCG tablet  Outside Facility (Specify) Yes No   Sig: Take 1,000 mcg by mouth daily   dabigatran etexilate (PRADAXA) 150 mg capsu  Outside Facility (Specify) Yes No   Sig: Take 150 mg by mouth 2 (two) times a day   dicyclomine (BENTYL) 20 mg tablet   No No   Sig: Take 1 tablet (20 mg total) by mouth 4 (four) times a day (before meals and at bedtime)   digoxin (LANOXIN) 0 25 mg tablet   No No   Sig: Take 1 tablet (250 mcg total) by mouth daily   docusate sodium (COLACE) 100 mg capsule   No No   Sig: Take 1 capsule (100 mg total) by mouth 2 (two) times a day   famotidine (PEPCID) 20 mg tablet   No No   Sig: Take 1 tablet (20 mg total) by mouth daily   levothyroxine 25 mcg tablet  Outside Facility (Specify) Yes No   Sig: Take 25 mcg by mouth daily   metoprolol tartrate (LOPRESSOR) 25 mg tablet   No No   Sig: Take 1 tablet (25 mg total) by mouth every 8 (eight) hours   pantoprazole (PROTONIX) 40 mg tablet   No No   Sig: Take 1 tablet (40 mg total) by mouth 2 (two) times a day   polyethylene glycol (MIRALAX) 17 g packet   No No   Sig: Take 17 g by mouth daily   sodium chloride (OCEAN) 0 65 % nasal spray   No No   Si spray into each nostril as needed for congestion   sodium chloride 1 g tablet   No No   Sig: Take 2 tablets (2 g total) by mouth 3 (three) times a day   spironolactone (ALDACTONE) 50 mg tablet  Outside Facility (Specify) Yes No   Sig: Take 50 mg by mouth daily   sucralfate (CARAFATE) 1 g tablet   No No   Sig: Take 1 tablet (1 g total) by mouth 4 (four) times a day (before meals and at bedtime)   torsemide 40 MG TABS No No   Sig: Take 40 mg by mouth 2 (two) times a day      Facility-Administered Medications: None     Allergies: Allergies   Allergen Reactions   • Penicillins Hives       ------------------------------------------------------------------------------------------------------------  Advance Directive and Living Will:      Power of :    POLST:    ------------------------------------------------------------------------------------------------------------  Anticipated Length of Stay is > 2 midnights    Care Time Delivered:   Upon my evaluation, this patient had a high probability of imminent or life-threatening deterioration due to Acute respiratory failure with hypoxia hypercapnia require mechanical ventilation, which required my direct attention, intervention, and personal management  I have personally provided 45 minutes (21:00 to 21:45) of critical care time, exclusive of procedures, teaching, family meetings, and any prior time recorded by providers other than myself  Rishabh White MD        Portions of the record may have been created with voice recognition software  Occasional wrong word or "sound a like" substitutions may have occurred due to the inherent limitations of voice recognition software    Read the chart carefully and recognize, using context, where substitutions have occurred

## 2022-10-17 NOTE — ASSESSMENT & PLAN NOTE
Wt Readings from Last 3 Encounters:   10/16/22 (!) 186 kg (409 lb 6 3 oz)   09/09/22 (!) 190 kg (419 lb 15 6 oz)   09/12/22 (!) 190 kg (418 lb 14 oz)       Patient outpatient regimen of metoprolol, spironolactone and torsemide  Will hold as patient is currently admitted hypotension receiving pressors  Monitor volume status

## 2022-10-17 NOTE — ASSESSMENT & PLAN NOTE
Patient history of OHS with baseline pCO2 approximately 50  Initial ABG showed significant elevation in pCO2 to 90  He is currently on AC/VC at tidal volume 500/FiO2 of 45%/rate of 18/peep of 8  Will continue to monitor  Obtain periodic blood gas analyses

## 2022-10-17 NOTE — ASSESSMENT & PLAN NOTE
In BMI of 55, with multiple sequelae including OHS, hypertension  Consult appropriate lifestyle modifications for attainment of target BMI

## 2022-10-17 NOTE — SEPSIS NOTE
Sepsis Note   Harmony Chinchilla 47 y o  male MRN: [id number]  Unit/Bed#: ICU 06 Encounter: [id number]       qSOFA     Row Name 10/16/22 2300 10/16/22 2200             Altered mental status GCS < 15 -- --       Respiratory Rate > / =86 0 0       Systolic BP < / =024 -- 0       Q Sofa Score 0 0                   Mr Justo Whitten presents with altered mental status, acute respiratory failure with hypoxia and hypercapnia requiring ETT intubation and mechanical ventilation, in addition to hypotension, requiring the initiation of vasopressors  He meets SIRS criteria with tachycardia, and hypotension, and lactate greater than 2  At this time, there is no focus of infection, however, will follow-up blood cultures and urine cultures  Empirical antimicrobial coverage including both MRSA and pseudomomas has been initiated with vancomycin and cefepime  Will continue to trend lactate until normalizes and titrate pressors to maintain MAP greater than 65 mmHg

## 2022-10-17 NOTE — PROCEDURES
Arterial Line Insertion    Date/Time: 10/16/2022 10:01 PM  Performed by: RADHA Hdz  Authorized by: RADHA Hdz     Patient location:  Bedside  Consent:     Consent obtained:  Emergent situation  Universal protocol:     Radiology Images displayed and confirmed  If images not available, report reviewed: yes      Required blood products, implants, devices, and special equipment available: yes      Site/side marked: yes      Immediately prior to procedure a time out was called: yes      Patient identity confirmed: Anonymous protocol, patient vented/unresponsive, hospital-assigned identification number and arm band  Indications:     Indications: hemodynamic monitoring and frequent labs / infusion    Pre-procedure details:     Skin preparation:  Chlorhexidine    Preparation: Patient was prepped and draped in sterile fashion    Anesthesia (see MAR for exact dosages): Anesthesia method:  Local infiltration    Local anesthetic:  Lidocaine 1% w/o epi  Procedure details:     Location / Tip of Catheter:  Radial    Laterality:  Left (unsuccessful attempt x2 on right  successful on left)    Needle gauge:  20 G    Placement technique:  Seldinger    Number of attempts:  3    Successful placement: yes    Post-procedure details:     Post-procedure:  Sterile dressing applied, sutured and wrist guard applied    CMS:  Unable to assess    Patient tolerance of procedure:   Tolerated well, no immediate complications

## 2022-10-17 NOTE — ASSESSMENT & PLAN NOTE
a 47 y o  male with a  history of morbid obesity, OHS, FILIPPO, AFib, hypothyroidism, hypertension, CHF who presents from [address] [place] [address] after noted minimally responsive, only to noxious stimuli  He was also noted to be in respiratory distress and hypotensive  He was initially sent to [place] ED, at which time he was noted to be in respiratory distress with ABG showing pH 7 36/pCO2 96 5/bicarb 54  Subsequent to this, patient was intubated and placed on mechanical ventilation on AC/VC  He received fentanyl and ketamine for sedation  Central line was placed in the right subclavian  Workup for infectious etiologies unremarkable, with no leukocytosis, lactic acid below 2, however procalcitonin 0 36  Workup for cardiac source included an EKG which showed AFib with multiple PVCs and right bundle-branch block  Electrolytes and CBC at that time were unremarkable  While at [place] ED, patient went to cardiac arrest and received epinephrine x1, in addition to amiodarone, with ROSC achieved  He was subsequently transferred to [facility] ICU for further management  Maintain cardiopulmonary monitoring  Place arterial line  Continue pressor support; chronic epinephrine, Levophed, vasopressin gtts-adjust to maintain MAP greater than 60 mm Hg  Will obtain 2D echocardiogram   Obtain BMP  Monitor electrolytes

## 2022-10-17 NOTE — ASSESSMENT & PLAN NOTE
Patient history of OHS with baseline pCO2 approximately 50  Initial ABG showed significant elevation in pCO2 to 90  He is currently on AC/VC at tidal volume 500/FiO2 of 45%/rate of 18/peep of 8  Will continue to monitor  Obtain periodic blood gas

## 2022-10-17 NOTE — PROGRESS NOTES
Vancomycin Assessment    Harmony Chinchilla is a 47 y o  male who is currently receiving vancomycin vancomycin 2750mg q12h for bacteremia     Relevant clinical data and objective history reviewed:  Creatinine   Date Value Ref Range Status   10/16/2022 0 98 0 60 - 1 30 mg/dL Final     Comment:     Standardized to IDMS reference method   10/16/2022 1 01 0 60 - 1 30 mg/dL Final     Comment:     Standardized to IDMS reference method   09/09/2022 0 59 (L) 0 60 - 1 30 mg/dL Final     Comment:     Standardized to IDMS reference method     /70   Pulse (!) 121   Temp (!) 95 54 °F (35 3 °C)   Resp 18   SpO2 97%   No intake/output data recorded  Lab Results   Component Value Date/Time    BUN 22 10/16/2022 09:21 PM    WBC 5 27 10/16/2022 09:21 PM    HGB 13 8 10/16/2022 09:21 PM    HGB 11 6 (L) 10/16/2022 09:18 PM    HCT 46 8 10/16/2022 09:21 PM    HCT 34 (L) 10/16/2022 09:18 PM    MCV 97 10/16/2022 09:21 PM    PLT 96 (L) 10/16/2022 09:21 PM     Temp Readings from Last 3 Encounters:   10/16/22 (!) 95 54 °F (35 3 °C)   09/10/22 97 7 °F (36 5 °C)   09/12/22 97 8 °F (36 6 °C)     Vancomycin Days of Therapy: 1    Assessment/Plan  The patient is currently on vancomycin utilizing scheduled dosing based on adjusted body weight (due to obesity)  The patient is currently ordered vancomycin 2750mg q12h and after clinical evaluation will be changed to vancomycin 1750mg q8h  Pharmacy will also follow closely for s/sx of nephrotoxicity, infusion reactions, and appropriateness of therapy  BMP and CBC will be ordered per protocol  Plan for trough as patient approaches steady state, prior to the 5th  dose at approximately 0700 10/18  Due to infection severity, will target a trough of 15-20 (appropriate for most indications)   Pharmacy will continue to follow the patient’s culture results and clinical progress daily      Hebert Rose, Pharmacist

## 2022-10-18 PROBLEM — D64.9 ANEMIA: Status: ACTIVE | Noted: 2022-01-01

## 2022-10-18 PROBLEM — N17.9 AKI (ACUTE KIDNEY INJURY) (HCC): Status: ACTIVE | Noted: 2022-01-01

## 2022-10-18 PROBLEM — E87.20 LACTIC ACIDOSIS: Status: ACTIVE | Noted: 2022-01-01

## 2022-10-18 NOTE — PLAN OF CARE
Problem: Prexisting or High Potential for Compromised Skin Integrity  Goal: Skin integrity is maintained or improved  Description: INTERVENTIONS:  - Identify patients at risk for skin breakdown  - Assess and monitor skin integrity  - Assess and monitor nutrition and hydration status  - Monitor labs   - Assess for incontinence   - Turn and reposition patient  - Assist with mobility/ambulation  - Relieve pressure over bony prominences  - Avoid friction and shearing  - Provide appropriate hygiene as needed including keeping skin clean and dry  - Evaluate need for skin moisturizer/barrier cream  - Collaborate with interdisciplinary team   - Patient/family teaching  - Consider wound care consult   Outcome: Progressing     Problem: MOBILITY - ADULT  Goal: Maintain or return to baseline ADL function  Description: INTERVENTIONS:  -  Assess patient's ability to carry out ADLs; assess patient's baseline for ADL function and identify physical deficits which impact ability to perform ADLs (bathing, care of mouth/teeth, toileting, grooming, dressing, etc )  - Assess/evaluate cause of self-care deficits   - Assess range of motion  - Assess patient's mobility; develop plan if impaired  - Assess patient's need for assistive devices and provide as appropriate  - Encourage maximum independence but intervene and supervise when necessary  - Involve family in performance of ADLs  - Assess for home care needs following discharge   - Consider OT consult to assist with ADL evaluation and planning for discharge  - Provide patient education as appropriate  Outcome: Progressing  Goal: Maintains/Returns to pre admission functional level  Description: INTERVENTIONS:  - Perform BMAT or MOVE assessment daily    - Set and communicate daily mobility goal to care team and patient/family/caregiver     - Collaborate with rehabilitation services on mobility goals if consulted  - Out of bed for toileting  - Record patient progress and toleration of activity level   Outcome: Progressing     Problem: Potential for Falls  Goal: Patient will remain free of falls  Description: INTERVENTIONS:  - Educate patient/family on patient safety including physical limitations  - Instruct patient to call for assistance with activity   - Consult OT/PT to assist with strengthening/mobility   - Keep Call bell within reach  - Keep bed low and locked with side rails adjusted as appropriate  - Keep care items and personal belongings within reach  - Initiate and maintain comfort rounds  - Make Fall Risk Sign visible to staff  - Apply yellow socks and bracelet for high fall risk patients  - Consider moving patient to room near nurses station  Outcome: Progressing     Problem: SAFETY,RESTRAINT: NV/NON-SELF DESTRUCTIVE BEHAVIOR  Goal: Remains free of harm/injury (restraint for non violent/non self-detsructive behavior)  Description: INTERVENTIONS:  - Instruct patient/family regarding restraint use   - Assess and monitor physiologic and psychological status   - Provide interventions and comfort measures to meet assessed patient needs   - Identify and implement measures to help patient regain control  - Assess readiness for release of restraint   Outcome: Progressing  Goal: Returns to optimal restraint-free functioning  Description: INTERVENTIONS:  - Assess the patient's behavior and symptoms that indicate continued need for restraint  - Identify and implement measures to help patient regain control  - Assess readiness for release of restraint   Outcome: Progressing

## 2022-10-18 NOTE — PROGRESS NOTES
Vancomycin IV Pharmacy-to-Dose Consultation    Priscila Mora is a 47 y o  male who is currently receiving Vancomycin IV with management by the Pharmacy Consult service  Assessment/Plan:  The patient was reviewed  CVVHD has been initiated  No signs or symptoms of infusion reactions were documented in the chart  Vancomycin trough this morning was 44 6  Based on today’s assessment, vancomycin (day # 2),  dosing will be held until random vancomycin level has dropped below 20  Once level is no longer supra-therapeutic, vancomycin will be restarted at 2000 mg every 24 hours  We will continue to follow the patient’s culture results and clinical progress daily      Roberta Kenney

## 2022-10-18 NOTE — PROGRESS NOTES
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  Nephrology consulted for management of severe lactic acidosis and ESTIVEN and need for CRRT  ASSESSMENT/PLAN:  Severe lactic acidosis:  -in the setting of cardiogenic shock and ESTIVEN  -CRRT initiated this am  -lactate 28 7, poor prognosis  -full code initially   -monitor serial labs  -management per critical care team    ESTIVEN:  Suspect due to cardiorenal syndrome, hypotension in setting of cardiogenic shock  -Admission creatinine 1  14  Creatinine increased to 1 73 since admit and CRRT initiated  -Peak creatinine 1 82, trending  -baseline creatinine 0 9-1 1   -workup: UA with small blood, large leukocytes, 2+ protein, moderate bilirubin, 4-10 RBC, innumerable wbc's and bacteria  -volume status hypervolemic  -anuric/olgiuric  UO 75 ml/since admit  -Plan:  • On CRRT, currently running UF even  • Strict I/Os, daily weights  • Avoid nephrotoxins, NSAIDs, IV contrast if possible  • Avoid hypotension  Maintain MAP >65    On multiple pressors  • Trend BMP  • Adjust meds to appropriate GFR  • Optimize hemodynamic status to avoid delay in renal recovery  • d/w Dr Jack Lynn    Access:  L IJV temporary dialysis catheter 10/18/22  -site clear  -continue to use catheter for CRRT    Cardiogenic shock/ Hypotension/Volume status:  -BP hypotensive despite max on multiple pressors and epinephrine  -acidosis likely contributing to ineffectiveness of pressors  -volume status hypervolemic  -Current medications:  Sanchez-Synephrine, vaso, Levophed, epinephrine drips  -Optimize hemodynamic status to avoid delay in renal recovery   -Avoid hypotension or fluctuations in blood pressure  Maintain MAP >65  -management per primary team    Acute on chronic systolic congestive heart failure:  -s/p cardiac arrest on presentation to ER  -volume status hypervolemic  -requiring multiple pressors and inotropes  -poor prognosis  -management per primary team    Anemia:  -Hgb 5 9 this a m  [place] -transfuse per primary team  -continue to trend  -Transfuse for Hgb <7 0  -management per primary team    Enterocutaneous fistula:  -due to multiple abdominal surgeries  -wound dressing in place  -management per primary team    Other issues:  Left lower lobe pneumonia, DM 2, morbid obesity, AFib , hypothyroidism  Management per primary team    SUBJECTIVE:  Patient unresponsive, on vent  Hemodynamics unstable with persistent hypotension despite max pressors and inotropes  CRRT just started prior to my evaluation at 0830 this am   Drips:  Sanchez 300, vaso 0 04, Levo 60 mcg, epi 15 mcg/min,, amio  A complete review of systems was performed  Pertinent positives and negatives noted in the HPI  Otherwise the review of systems was negative  OBJECTIVE:  Current Weight: Weight - Scale: (!) 178 kg (392 lb 6 7 oz)  Vitals:    10/18/22 0930 10/18/22 0945 10/18/22 1000 10/18/22 1015   BP:       Pulse: (!) 106 (!) 124 (!) 127 (!) 121   Resp: (!) 28 (!) 28 (!) 28 (!) 28   Temp: 97 9 °F (36 6 °C) 97 7 °F (36 5 °C) 97 5 °F (36 4 °C) (!) 97 3 °F (36 3 °C)   TempSrc:       SpO2: (!) 83% (!) 77% (!) 74% (!) 75%   Weight:       Height:           Intake/Output Summary (Last 24 hours) at 10/18/2022 1157  Last data filed at 10/18/2022 1100  Gross per 24 hour   Intake 9987 19 ml   Output 215 ml   Net 9772 19 ml     General:  On vent, unresponsive  On CRRT  Morbidly obese  Skin:  No rash, warm, good skin turgor   Eyes:  PERRL, EOMI, sclerae nonicteric   no periorbital edema   ENT:  Moist mucous membranes  +ETT  Neck:  Trachea midline, symmetric  No JVD   L IJV temporary dialysis catheter in place  Chest:  Coarse breath sounds  CVS:  Regular rate and rhythm without murmur, gallop or rub  S1 and S2 identified and normal   No S3, S4    Abdomen:  Obese, Soft, nondistended without masses  +colostomy and midline wound  Rare hypoactive bowel sounds x 4 quadrants  No bruit  Extremities:  Cool, mottling BLE   No cyanosis, pallor  No BLE edema  Neuro:  Unresponsive on the vent  Psych:  Unresponsive  : carmen catheter in place draining concentrated small amts urine  Access:  L IJV temporary dialysis catheter site clear with dressing in place    Currently being accessed for CRT     Medications:    Current Facility-Administered Medications:   •  [] amiodarone (CORDARONE) 900 mg in dextrose 5 % 500 mL infusion, 1 mg/min, Intravenous, Continuous, Stopped at 10/17/22 0732 **FOLLOWED BY** amiodarone (CORDARONE) 900 mg in dextrose 5 % 500 mL infusion, 0 5 mg/min, Intravenous, Continuous, Kanika Abel MD, Last Rate: 16 7 mL/hr at 10/17/22 2140, 0 5 mg/min at 10/17/22 2140  •  dextrose infusion 10 %, 50 mL/hr, Intravenous, Continuous, Ng Madelyn, CRNP, Last Rate: 50 mL/hr at 10/18/22 0026, 50 mL/hr at 10/18/22 0026  •  EPINEPHrine 6000 mcg (DOUBLE CONCENTRATION) IV in sodium chloride 0 9% 250 mL, 1-15 mcg/min, Intravenous, Titrated, Remy Blanchard, Last Rate: 37 5 mL/hr at 10/18/22 0858, 15 mcg/min at 10/18/22 0858  •  fentaNYL 1000 mcg in sodium chloride 0 9% 100mL infusion, 100 mcg/hr, Intravenous, Continuous, Sandra Crandall MD, Stopped at 10/18/22 0119  •  fentanyl citrate (PF) 100 MCG/2ML 100 mcg, 100 mcg, Intravenous, Q1H PRN, Zheng Chapman DO  •  fentanyl citrate (PF) 100 MCG/2ML 150 mcg, 150 mcg, Intravenous, Once, Samson Rod MD  •  fentanyl citrate (PF) 100 MCG/2ML 25 mcg, 25 mcg, Intravenous, Q1H PRN, Kanika Abel MD, 25 mcg at 10/17/22 0421  •  glycerin-hypromellose- (ARTIFICIAL TEARS) ophthalmic solution 1 drop, 1 drop, Both Eyes, Q4H PRN, Donald Stinsonea, CRNP  •  LORazepam (ATIVAN) injection 1 mg, 1 mg, Intravenous, Once, Nitish Rodriguez MD  •  LORazepam (ATIVAN) injection 1 mg, 1 mg, Intravenous, Q10 Min PRN, Bruce Jung DO  •  LORazepam (ATIVAN) injection 2 mg, 2 mg, Intravenous, Once, Vidya Saul MD  •  norepinephrine (LEVOPHED) 8 mg (DOUBLE CONCENTRATION) IV in sodium chloride 0 9% 250 mL, 1-60 mcg/min, Intravenous, Titrated, Benjamín Judd, Last Rate: 112 5 mL/hr at 10/18/22 0843, 60 mcg/min at 10/18/22 0843  •  NxStage K 2/Ca 3 dialysis solution (RFP-400) 20,000 mL, 20,000 mL, Dialysis, Continuous, Fabiana Garland DO, 20,000 mL at 10/18/22 0645  •  pantoprazole (PROTONIX) injection 40 mg, 40 mg, Intravenous, Q12H [address], Ng Madelyn, CRNP, 40 mg at 10/18/22 0809  •  phenylephrine (YONI-SYNEPHRINE) 50 mg (STANDARD CONCENTRATION) in sodium chloride 0 9% 250 mL,  mcg/min, Intravenous, Titrated, Elvira Weber MD, Last Rate: 90 mL/hr at 10/18/22 0751, 300 mcg/min at 10/18/22 0751  •  sodium bicarbonate 150 mEq in dextrose 5 % 1,000 mL infusion, 125 mL/hr, Intravenous, Continuous, Ng Madelyn, CRNP, Last Rate: 150 mL/hr at 10/18/22 0918, 150 mL/hr at 10/18/22 1717  •  vasopressin (PITRESSIN) 20 Units in sodium chloride 0 9 % 100 mL infusion, 0 04 Units/min, Intravenous, Continuous, RADHA Caldera, Last Rate: 12 mL/hr at 10/18/22 0751, 0 04 Units/min at 10/18/22 0751    Laboratory Results:  Results from last 7 days   Lab Units 10/18/22  0752 10/18/22  0549 10/18/22  0448 10/18/22  0359 10/18/22  0031 10/17/22  2015 10/17/22  1623 10/17/22  1145 10/17/22  0806 10/17/22  0203 10/16/22  2121 10/16/22  2118 10/16/22  1904 10/16/22  1904 10/16/22  1724   WBC Thousand/uL  --  10 17* 8 96  --   --   --   --   --  15 75*  --  5 27  --   --   --  5 79   HEMOGLOBIN g/dL  --  5 9* 5 9*  --   --   --   --   --  10 0*  --  13 8  --   --   --  9 8*   I STAT HEMOGLOBIN g/dl  --   --   --   --   --   --   --   --   --   --   --  11 6*  --  16 0 --    HEMATOCRIT %  --  20 7* 20 0*  --   --   --   --   --  32 3*  --  46 8  --   --   --  32 8*   HEMATOCRIT, ISTAT %  --   --   --   --   --   --   --   --   --   --   --  34*  --  47  --    PLATELETS Thousands/uL  --  64* 65*  --   --   --   --   --  159  --  96*  --   --   --  147*   SODIUM mmol/L 142  --   --  140 134* 134* 134* 135 134*   < > 134*  --   --   --  133*   POTASSIUM mmol/L 4 9  --   --  4 8 5 1 4 2 3 6 3 7 4 3   < > 3 3*  --   --   --  4 3   CHLORIDE mmol/L 96  --   --  96 95* 93* 92* 91* 90*   < > 81*  --   --   --  77*   CO2 mmol/L 8*  --   --  10* 9* 20* 23 28 30   < > 39*  --   --   --  >45*   CO2, I-STAT mmol/L  --   --   --   --   --   --   --   --   --   --   --  >45*   < > >45*  --    BUN mg/dL 30*  --   --  30* 28* 28* 27* 26* 26*   < > 22  --   --   --  20   CREATININE mg/dL 1 82*  --   --  1 73* 1 73* 1 43* 1 36* 1 14 1 07   < > 0 98  --   --   --  1 01   CALCIUM mg/dL 7 7*  --   --  7 5* 8 3* 8 2* 8 5 8 6 8 2*   < > 7 7*  --   --   --  8 8   MAGNESIUM mg/dL 2 3  --   --  2 2 2 3 2 1 2 1 2 2 2 4   < > 3 4*  --   --   --   --    PHOSPHORUS mg/dL 6 6*  --   --  5 7* 5 5*  --   --   --   --   --  5 3*  --   --   --   --    GLUCOSE, ISTAT mg/dl  --   --   --   --   --   --   --   --   --   --   --  249*  --  232*  --     < > = values in this interval not displayed  I have personally reviewed the blood work as stated above and in my note  I have personally reviewed internal Medicine, co-consultants and previous nephrology notes

## 2022-10-18 NOTE — DEATH NOTE
INPATIENT DEATH NOTE  Arianna Samayoa 47 y o  male MRN: [id number]  Unit/Bed#: ICU 06 Encounter: [id number]    Date, Time and Cause of Death    Date of Death: 10/18/22  Time of Death:  2:41 PM  Preliminary Cause of Death: Cardiac arrest  Entered by: Dr Erika Israel DO[ZB1 1]     Attribution     ZB1  Brice Goodwin U  97 , DO 10/18/22 14:46           Patient's Information  Pronounced by: Dr Marlon Kay  Did the patient's death occur in the ED?: No  Did the patient's death occur in the OR?: No  Did the patient's death occur less than 10 days post-op?: No  Did the patient's death occur within 24 hours of admission?: No  Was code status DNR at the time of death?: Yes    PHYSICAL EXAM:  Unresponsive to noxious stimuli, Spontaneous respirations absent, Breath sounds absent, Carotid pulse absent, Heart sounds absent and Pupillary light reflex absent    Medical Examiner notification criteria:  NONE APPLICABLE   Medical Examiner's office notified?:  Yes   Medical Examiner accepted case?:  No  Name of Medical Examiner: Dr Dillon Wilcox     Family Notification  Was the family notified?: Yes  Date Notified: 10/18/22  Time Notified:   Notified by: Dr Marlon Kay  Name of Family Notified of Death: Alexandra (sister of the patient)   Relationship to Patient: Sister  Family Notification Route:  At bedside  Was the family told to contact a  home?: Yes (Have not decided on  home)    Autopsy Options:  Post-mortem examination declined by next of kin    Primary Service Attending Physician notified?:  yes - Attending:  Roxy Mckay,*    Physician/Resident responsible for completing Discharge Summary:  Dr Marlon Kay

## 2022-10-18 NOTE — UTILIZATION REVIEW
Please note as of today 10/18/2022 the patient remains inpatient status       NOTIFICATION OF INPATIENT ADMISSION   AUTHORIZATION REQUEST   SERVICING FACILITY:   [facility] [place] [address]  Tax ID: [id number]  NPI: [id number]   ATTENDING PROVIDER:  Attending Name and NPI#: Komal Whitman [[id number]]  Address: [address] [place] [address]  Phone: [phone]     ADMISSION INFORMATION:  Place of Service: Inpatient [address] [facility]  [address]  Place of Service Code: 21  Inpatient Admission Date/Time: 10/16/22  8:57 PM  Discharge Date/Time: No discharge date for patient encounter  Admitting Diagnosis Code/Description:  Altered mental status [R41 82]     UTILIZATION REVIEW CONTACT:  Kalli Larios Utilization   Network Utilization Review Department  Phone: [phone]  Fax: [phone]  Email: [email or web address]  Contact for approvals/pending authorizations, clinical reviews, and discharge  PHYSICIAN ADVISORY SERVICES:  Medical Necessity Denial & Nixc-ui-Xbpr Review  Phone: [phone]  Fax: [phone]  Email: [email or web address]

## 2022-10-18 NOTE — PROGRESS NOTES
Daily Progress Note - Critical Care   Kathryn Piedra 47 y o  male MRN: [id number]  Unit/Bed#: ICU 06 Encounter: [id number]        ----------------------------------------------------------------------------------------  HPI:  52-year-old male with a past medical history significant for morbid obesity, ohs, FILIPPO, AFib, hypothyroidism, hypertension, CHF who presented from [address] [place] [address] (10/16) with a chief complaint of altered mental status  At nursing facility the patient was found to be minimally responsive (only to noxious stimuli), hypotensive, and in respiratory distress  Patient was sent to [facility] ED, for which patient was noted to be in respiratory distress with an ABG showing pH 7 36, pCO2 96 5, bicarb 54  Due to the patient's worsening respiratory status, he was intubated and placed on mechanical ventilation (AC/VC); received fentanyl and ketamine for sedation  Patient has centralized in place in the right jugular vein for access  Patient's workup in the ED was unremarkable for infectious etiologies including absent leukocytosis, lactic acid below 2 0, but there was a Procalcitonin level of 0 36; cbc and electrolytes were WNL at that time  Imaging studies done include CT head (no acute intracranial abnormality); However, the patient's EKG showed AFib multiple PVCs and a RBBB  Shortly after, the patient went into cardiac arrest, requiring CPR (2 rounds),given epinephrine x1, and achieved ROSC; pt was intubated during CPR (without pausing for compressions), and started on levophed drip  A central line was placed in the right internal jugular vein, and started on antibiotics (IV cefepime and IV vancomycin D#1)    Patient was deemed in need of further management and was transported to [facility]     ----------------------------------------------------------------------------------------  24hr events:   · Levo increased to 60 mcg/min Epi (doubled) 15 mvg/min Vasopressin 0 04 mL/hr Sodium Bicarb 125 mL/hr  · Femoral A line inserted   · None to minimal UOP  · Hypoglycemia BS of 24 - given 1 amp of D50 and D10 drip started  · Lactic acid increased to 19 8; given 2 amps of Bicarb  · HGB   · Sister Lissy Raphael was told by overnight team about multi-orgam failure and maxing out on pressors - family reports keeping patient full code    ---------------------------------------------------------------------------------------  SUBJECTIVE  Patient intubated  Review of Systems   Unable to perform ROS: Intubated     Review of systems was reviewed and negative unless stated above in HPI/24-hour events   ---------------------------------------------------------------------------------------  Assessment and Plan    Principal Problem:    Cardiac arrest [facility])  Active Problems:    Atrial fibrillation with rapid ventricular response (HCC)    Morbid obesity ([facility] )    Obesity hypoventilation syndrome (HCC)    Chronic combined systolic and diastolic heart failure (HCC)    Atrial fibrillation (HCC)    Hypothyroidism    History of DVT (deep vein thrombosis)    Hyponatremia    Enterocutaneous fistula    Seizure-like activity ([facility] )    Shock ([facility] )    ESTIVEN (acute kidney injury) ([facility] )    Lactic acidosis    Anemia  Resolved Problems:    * No resolved hospital problems  *        Problem List:  1  Multi-organ dysfunction secondary to mixed cardiogenic & septic shock   2  Toxic metabolic encephalopathy   3  Acute kidney injury   4  Hypoxic respiratory failure ventilator dependent  5  Cardiac arrest  6  Afib with RVR  7  Anemia  8  Thrombocytopenia  9  High AG metabolic acidosis (w/severe lactic acidosis)  10  Hypoglycemia   11  Seizure like activity   12  Left Lower Lobe PNA  13  OHS  14  Hx of SBO with enterocutaneous fistula   15  Acute on chronic HFrEF  16  Super Morbid obesity   17   Hypothyroidism      Neuro:   • Diagnosis: toxic metabolic encephalopathy   o 2/2 multiorgan dysfunction (shock liver, decreased urine output/anuric renal failure, hypoxic respiratory failure)  o Plan: CRRT, continue on pressors maintain MAP>65, serial labs  • Diagnosis: Intubated/on mechanical ventilation (IBW: 140 4 lbs/63 68 kg)  o RASS goal: 0 to -1 ; current RASS:-4; ICAM-ICU: N/A  o AC/PC: rate 28, IP 25, PEEP 10, FiO2 55%; holding sedation  o Pt encephalopathic overnight d/t worsening symptoms   o Plan: Continue on vent, GOC discussion with family    • Diagnosis: Seizure-like activity  o Possible seizure like activity during CT transport  o Plan: continue to monitor     CV:   • Diagnosis: Multiorgan dysfunction secondary to mixed Cardiogenic and septic/distributive shock   o CT unremarkable; Maximum pressure support; encephalopathic, hypoglycemic overnight   o Plan: Continue on pressors (maintain MAP>65), CRRT per nephrology, follow up cultures, monitor temperature/vital signs; continue stress dose steroids, continue on vent, serial labs    • Diagnosis: HFrEF  o Worsening EF on repeat ECHO  o Plan: Continue off diuretics, monitor VS, continue shock management as above  • Diagnosis:  Afib with RVR  o Failed cardioversion; continue amiodarone gtt, hold AC due to severely reduced HGB      Pulm:  • Diagnosis: Hypoxic  respiratory failure ventilator dependent   o AC/PC rate 28, IP 25, PEEP 10, FiO2 55%; -620s  o Plan:  Continue on Vent, GOC with family   • Diagnosis: OHS   o Plan as above     GI:   • Diagnosis: H/o SBO with enterocutaneous fistula development   o Draining liquid stool; continue to monitor, continue NPO    :   • Diagnosis: High AGMA with severe lactic acidosis and with respiratory acidosis   o Serial ABG, trend lactic acid, continue on vent, continue sodium bicarb, CRRT per nephrology, monitor UOP, trend labs, 2K/3cal bath  • Diagnosis:  ESTIVEN (most likely cardiorenal syndrome/hypotension)  o B/L Cr 0 9-1 1 currently 1 73     F/E/N:   • Fluids: Continue on D10 + drips  • Electrolytes: Replete as needed   • Nutrition: NPO      Heme/Onc: • Diagnosis: Anemia   o Drop from 10 0 to 5 9 x 2;     Endo:   • Diagnosis: Hypoglycemia  o Drop in BG, started on D10 then D50 2 amps, now D10 continuous   o Plan: continue D10 50 mL/hr, goal BG < 180   • Diagnosis: Hypothyroidism  o Continue Levothyroxine when able     ID:   • Diagnosis: Cardiogenic vs Distributive shock vs mixed   o Mild leukocytosis   o ABx D#3 (IV vancomycin and IV cefepime)  o Plan: Continue antibiotics, monitor cbc     MSK/Skin:   • Diagnosis: Wound present on back;   o Plan: follow up Wound care   • Diagnosis: Super morbid obesity   o 2018 s/p gastric bypass    Patient appropriate for transfer out of the ICU today?: No  Disposition: Continue Critical Care   Code Status: Level 1 - Full Code  ---------------------------------------------------------------------------------------  ICU CORE MEASURES    Prophylaxis   VTE Pharmacologic Prophylaxis: Heparin Drip  VTE Mechanical Prophylaxis: sequential compression device  Stress Ulcer Prophylaxis: Prophylaxis Not Indicated     ABCDE Protocol (if indicated)  Plan to perform spontaneous awakening trial today? No  Plan to perform spontaneous breathing trial today? Not applicable  Obvious barriers to extubation? No  CAM-ICU: Negative    Invasive Devices Review  Invasive Devices  Report    Central Venous Catheter Line  Duration           CVC Central Lines 10/16/22 Triple Right Subclavian 1 day          Peripheral Intravenous Line  Duration           Peripheral IV 10/16/22 Right Antecubital 1 day          Arterial Line  Duration           Arterial Line 10/17/22 Femoral <1 day          Hemodialysis Catheter  Duration           HD Temporary Double Catheter <1 day          Drain  Duration           Open Drain Medial RUQ 95 days    NG/OG/Enteral Tube Orogastric 14 Fr Left mouth 1 day    Urethral Catheter 1 day          Airway  Duration           ETT  8 mm 1 day              Can any invasive devices be discontinued today? No  ---------------------------------------------------------------------------------------  OBJECTIVE    Vitals   Vitals:    10/18/22 0615 10/18/22 0620 10/18/22 0625 10/18/22 0630   BP:       Pulse: (!) 133 (!) 130 (!) 138 (!) 129   Resp: (!) 28 (!) 28 (!) 28 (!) 28   Temp: 98 6 °F (37 °C) 98 6 °F (37 °C) 98 6 °F (37 °C) 98 6 °F (37 °C)   TempSrc:       SpO2: (!) 84% (!) 81% (!) 78% (!) 82%   Weight:       Height:         Temp (24hrs), Av 6 °F (37 °C), Min:97 5 °F (36 4 °C), Max:100 22 °F (37 9 °C)  Current: Temperature: 98 6 °F (37 °C)  Temp (24hrs), Av 6 °F (37 °C), Min:97 5 °F (36 4 °C), Max:100 22 °F (37 9 °C)    Temp:  [98 1 °F (36 7 °C)-100 22 °F (37 9 °C)] 98 4 °F (36 9 °C)  HR:  [114-162] 114  Resp:  [16-31] 28  BP: ()/(15-57) 86/15  SpO2:  [78 %-100 %] 85 %  Body mass index is 63 34 kg/m²  Respiratory:  SpO2: SpO2: (!) 82 %, SpO2 Activity: SpO2 Activity: At Rest, SpO2 Device: O2 Device: Ventilator, Capnography:         Invasive/non-invasive ventilation settings   Respiratory  Report   Lab Data (Last 4 hours)      10/18 0414            pH, Arterial       7 153             pCO2, Arterial       29 4             pO2, Arterial       178 1             HCO3, Arterial       10 1             Base Excess, Arterial       -17 2                  O2/Vent Data       10/18 034   Most Recent         Vent Mode AC/PC  AC/PC      Resp Rate (BPM) (BPM) 28  28      Pressure Control (cmH2O) (cm) 25  25      Insp Time (sec) (sec) 0 8  0 8      FiO2 (%) (%) 50  50      PEEP (cmH2O) (cmH2O) 10  10      MV 16  16                  Physical Exam  Constitutional:       Appearance: He is obese  Interventions: He is sedated and intubated  HENT:      Head: Normocephalic and atraumatic        Mouth/Throat:      Mouth: Mucous membranes are moist    Eyes:      Conjunctiva/sclera: Conjunctivae normal       Comments: - Very sluggish pupil reflex to light    Cardiovascular:      Rate and Rhythm: Tachycardia present  Rhythm irregular  Comments: Central line- triple-lumen placed on right side   Pulmonary:      Effort: He is intubated  Breath sounds: Decreased breath sounds present  No wheezing  Abdominal:      Palpations: Abdomen is soft  Comments: Enterocutaneous fistula with bag draining brown colored fluid    Genitourinary:     Comments: Urinary cath in place CDI  Musculoskeletal:      Right lower leg: Edema present  Left lower leg: Edema present  Comments: Femoral A Line CDI   Skin:     General: Skin is warm and dry               Laboratory and Diagnostics:  Results from last 7 days   Lab Units 10/18/22  0549 10/18/22  0448 10/17/22  0806 10/16/22  2121 10/16/22  2118 10/16/22  1904 10/16/22  1724   WBC Thousand/uL 10 17* 8 96 15 75* 5 27  --   --  5 79   HEMOGLOBIN g/dL 5 9* 5 9* 10 0* 13 8  --   --  9 8*   I STAT HEMOGLOBIN g/dl  --   --   --   --  11 6* 16 0  --    HEMATOCRIT % 20 7* 20 0* 32 3* 46 8  --   --  32 8*   HEMATOCRIT, ISTAT %  --   --   --   --  34* 47  --    PLATELETS Thousands/uL 64* 65* 159 96*  --   --  147*   NEUTROS PCT %  --   --   --  78*  --   --   --    BANDS PCT % 26* 39* 4  --   --   --   --    MONOS PCT %  --   --   --  5  --   --   --    MONO PCT % 5 3* 2*  --   --   --  2*     Results from last 7 days   Lab Units 10/18/22  0359 10/18/22  0031 10/17/22  2015 10/17/22  1623 10/17/22  1145 10/17/22  0806 10/17/22  0521 10/17/22  0203 10/16/22  2121 10/16/22  1904 10/16/22  1724   SODIUM mmol/L 140 134* 134* 134* 135 134* 134*   < > 134*  --  133*   POTASSIUM mmol/L 4 8 5 1 4 2 3 6 3 7 4 3 3 6   < > 3 3*  --  4 3   CHLORIDE mmol/L 96 95* 93* 92* 91* 90* 88*   < > 81*  --  77*   CO2 mmol/L 10* 9* 20* 23 28 30 31   < > 39*  --  >45*   CO2, I-STAT   --   --   --   --   --   --   --   --   --    < >  --    ANION GAP mmol/L 34* 30* 21* 19* 16* 14* 15*   < > 14*  --   --    BUN mg/dL 30* 28* 28* 27* 26* 26* 25   < > 22  --  20   CREATININE mg/dL 1 73* 1 73* 1 43* 1 36* 1  14 1 07 1 01   < > 0 98  --  1 01   CALCIUM mg/dL 7 5* 8 3* 8 2* 8 5 8 6 8 2* 8 2*   < > 7 7*  --  8 8   GLUCOSE RANDOM mg/dL 89 120 114 103 127 122 175*   < > 240*  --  161*   ALT U/L  --  50  --   --   --   --   --   --  33  --  23   AST U/L  --  165*  --   --   --   --   --   --  84*  --  50*   ALK PHOS U/L  --  138*  --   --   --   --   --   --  191*  --  168*   ALBUMIN g/dL  --  1 9*  --   --   --   --   --   --  2 6*  --  2 9*   TOTAL BILIRUBIN mg/dL  --  3 04*  --   --   --   --   --   --  4 31*  --  4 67*    < > = values in this interval not displayed  Results from last 7 days   Lab Units 10/18/22  0359 10/18/22  0031 10/17/22  2015 10/17/22  1623 10/17/22  1145 10/17/22  0806 10/17/22  0521 10/17/22  0203 10/16/22  2121   MAGNESIUM mg/dL 2 2 2 3 2 1 2 1 2 2 2 4 2 5   < > 3 4*   PHOSPHORUS mg/dL 5 7* 5 5*  --   --   --   --   --   --  5 3*    < > = values in this interval not displayed  Results from last 7 days   Lab Units 10/18/22  0124 10/17/22  1824 10/17/22  1137 10/17/22  0521 10/16/22  2121 10/16/22  1846   INR  2 74*  --   --   --  1 20* 1 16   PTT seconds >210* 172* 46* 71*  --  30          Results from last 7 days   Lab Units 10/18/22  0400 10/18/22  0124 10/17/22  2343 10/17/22  2015 10/17/22  1623 10/17/22  1419 10/17/22  1042   LACTIC ACID mmol/L 23 3* 21 1* 19 3* 9 9* 9 5* 11 0* 7 9*     ABG:  Results from last 7 days   Lab Units 10/18/22  0414 10/18/22  0126   PH ART  7 153* 7 138*   PCO2 ART mm Hg 29 4* 31 5*   PO2 ART mm Hg 178 1* 200 7*   HCO3 ART mmol/L 10 1* 10 4*   BASE EXC ART mmol/L -17 2 -17 2   ABG SOURCE   --  Line, Arterial     VBG:  Results from last 7 days   Lab Units 10/18/22  0534 10/18/22  0133 10/18/22  0126   PH TRACI  7 109*   < >  --    PCO2 TRACI mm Hg 38 5*   < >  --    PO2 TRACI mm Hg 43 6   < >  --    HCO3 TRACI mmol/L 11 9*   < >  --    BASE EXC TRACI mmol/L -16 2   < >  --    ABG SOURCE   --   --  Line, Arterial    < > = values in this interval not displayed  Results from last 7 days   Lab Units 10/17/22  0024 10/16/22  1724   PROCALCITONIN ng/ml 0 80* 0 36*       Micro  Results from last 7 days   Lab Units 10/16/22  2321 10/16/22  2238 10/16/22  1846 10/16/22  1724   BLOOD CULTURE  Received in Microbiology Lab  Culture in Progress  Received in Microbiology Lab  Culture in Progress  --  No Growth at 24 hrs  GRAM STAIN RESULT   --   --  Gram positive cocci in clusters*  --        Intake and Output  I/O       10/15 0701  10/16 0700 10/16 0701  10/17 0700    I V  (mL/kg)  3147 8 (17 7)    IV Piggyback  1334 2    Total Intake(mL/kg)  4482 (25 2)    Urine (mL/kg/hr)  340    Emesis/NG output  0    Total Output  340    Net  +4142                Height and Weights   Height: 5' 6" (167 6 cm)  IBW (Ideal Body Weight): 63 8 kg  Body mass index is 63 34 kg/m²  Weight (last 2 days)     Date/Time Weight    10/18/22 0536 178 (392 42)    10/18/22 0124 178 (392 42)    10/17/22 0700 177 (391)    10/17/22 0543 178 (391 76)    10/17/22 0400 178 (391 76)            Nutrition       Diet Orders   (From admission, onward)             Start     Ordered    10/16/22 2102  Diet NPO  Diet effective now        References:    Nutrtion Support Algorithm Enteral vs  Parenteral   Question Answer Comment   Diet Type NPO    RD to adjust diet per protocol?  No        10/16/22 2103              Active Medications  Scheduled Meds:  Current Facility-Administered Medications   Medication Dose Route Frequency Provider Last Rate   • amiodarone  0 5 mg/min Intravenous Continuous Nuno Pablo MD 0 5 mg/min (10/17/22 2140)   • cefepime  2,000 mg Intravenous Q12H Nuno Pablo MD Stopped (10/18/22 0100)   • chlorhexidine  15 mL Mouth/Throat Q12H [address] Nuno Pablo MD     • dextrose  50 mL/hr Intravenous Continuous RADHA Peña 50 mL/hr (10/18/22 0026)   • epinephrine  1-15 mcg/min Intravenous Titrated Eugene G Magdiel 15 mcg/min (10/18/22 0903)   • EPINEPHrine PF (FOR EMS ONLY)  3 each Does not apply Once RADHA Ramirez     • fentaNYL  100 mcg/hr Intravenous Continuous Kevin Carcamo MD Stopped (10/18/22 1211)   • fentanyl citrate (PF)  25 mcg Intravenous Q1H PRN Gela Lake MD     • glycerin-hypromellose-  1 drop Both Eyes Q4H PRN RADHA Ortiz     • hydrocortisone sodium succinate  50 mg Intravenous Q6H [facility] RADHA Ramirez     • levothyroxine  25 mcg Oral Early Morning Gela Lake MD     • LORazepam  2 mg Intravenous Once Gela Lake MD     • norepinephrine  1-60 mcg/min Intravenous Titrated Bonilla Din 60 mcg/min (10/18/22 0616)   • NxStage K 2/Ca 3  20,000 mL Dialysis Continuous Fabiana EBER Garland DO     • pantoprazole  40 mg Intravenous Q12H [facility] RADHA Ortiz     • phenylephine   mcg/min Intravenous Titrated Kevin Carcamo  mcg/min (10/18/22 0436)   • sodium bicarbonate infusion  125 mL/hr Intravenous Continuous RADHA Ortiz 125 mL/hr (10/18/22 0223)   • vancomycin  15 mg/kg (Adjusted) Intravenous Q8H Gela Lake MD Stopped (10/18/22 0300)   • vasopressin (PITRESSIN) in 0 9 % sodium chloride 100 mL  0 04 Units/min Intravenous Continuous RADHA Ramirez 0 04 Units/min (10/17/22 2257)     Continuous Infusions:  amiodarone, 0 5 mg/min, Last Rate: 0 5 mg/min (10/17/22 2140)  dextrose, 50 mL/hr, Last Rate: 50 mL/hr (10/18/22 0026)  epinephrine, 1-15 mcg/min, Last Rate: 15 mcg/min (10/18/22 0212)  fentaNYL, 100 mcg/hr, Last Rate: Stopped (10/18/22 0119)  norepinephrine, 1-60 mcg/min, Last Rate: 60 mcg/min (10/18/22 0616)  NxStage K 2/Ca 3, 20,000 mL  phenylephine,  mcg/min, Last Rate: 300 mcg/min (10/18/22 0436)  sodium bicarbonate infusion, 125 mL/hr, Last Rate: 125 mL/hr (10/18/22 0123)  vasopressin (PITRESSIN) in 0 9 % sodium chloride 100 mL, 0 04 Units/min, Last Rate: 0 04 Units/min (10/17/22 7377)      PRN Meds:   fentanyl citrate (PF), 25 mcg, Q1H PRN  glycerin-hypromellose-, 1 drop, Q4H PRN        Allergies   Allergies   Allergen Reactions   • Penicillins Hives     ---------------------------------------------------------------------------------------  Advance Directive and Living Will:      Power of :    POLST:    ---------------------------------------------------------------------------------------  Kisha Oliver,       Portions of the record may have been created with voice recognition software  Occasional wrong word or "sound a like" substitutions may have occurred due to the inherent limitations of voice recognition software    Read the chart carefully and recognize, using context, where substitutions have occurred

## 2022-10-18 NOTE — DISCHARGE SUMMARY
Discharge Summary - Arianna Samayoa 47 y o  male MRN: [id number]    Unit/Bed#: ICU 06 Encounter: [id number] PCP: Mari Ames MD    Admission Date:   Admission Orders (From admission, onward)     Ordered        10/16/22 2059  Inpatient Admission  Once                        Admitting Diagnosis: Altered mental status [R41 82]    HPI (from 10/16/2022 by Dr Sheila Gold MD):   "Arianna Samayoa is a 47 y o  male with a  history of morbid obesity, OHS, FILIPPO, AFib, hypothyroidism, hypertension, CHF who presents from [address] [place] [address] after noted minimally responsive, only to noxious stimuli  He was also noted to be in respiratory distress and hypotensive  He was initially sent to [place] ED, at which time he was noted to be in respiratory distress with ABG showing pH 7 36/pCO2 96 5/bicarb 54  Subsequent to this, patient was intubated and placed on mechanical ventilation on AC/VC  He received fentanyl and ketamine for sedation  Central line was placed in the right jugular  Workup for infectious etiologies unremarkable, with no leukocytosis, lactic acid below 2, however procalcitonin 0 36  Workup for cardiac source included an EKG which showed AFib with multiple PVCs and right bundle-branch block  Electrolytes and CBC at that time were unremarkable  While at [place] ED, patient went to cardiac arrest and received epinephrine x1, in addition to amiodarone, with ROSC achieved  He was subsequently transferred to [facility] ICU for further management "    Procedures Performed:   Orders Placed This Encounter   Procedures   • POC Cardiac US   • Temporary HD Catheter       Summary of Hospital Course:   Mr Arianna Samayoa was a 81-year-old male initially presented to [address] ED with a chief complaint of altered mental status  In ED patient's other be in respiratory distress with ABG showed his pH to 7 36 pCO2 96 5 bicarb 54 and was placed on mechanical ventilation after being intubated    Patient received fentanyl and ketamine for sedation  However shortly after patient went into cardiac arrest and received epinephrine x1 in addition to amiodarone with ROSC may achieve shortly after  Patient was [facility] ICU for further evaluation and management  During ICU inpatient stay, the patient was treated with maximum vaso-pressure support, antibiotics, fluids, ventilatory support, continuous renal replacement therapy, pain medications, diuretics, and sedation medication  The patient remained hypotensive after maximum vasopressor support  The patient's urine output also was was minimal to none during ICU admission  After discussion with family a decision was made to discontinue all vasopressor support and to transition to comfort care  The patient was provided pain medication and vasopressor support was discontinued  Shortly thereafter the patient was noted to go into pulseless electrical activity, and his heart rate was found to be at 0  The patient's time of death was called and his sister Ty Anderson was in the room during the whole process  Significant Findings, Care, Treatment and Services Provided:  - Intubation/ventilatory support  - Arterial line placement  - Nephrology consulted  - CT scan 10/17: enlargement of the main pulmonary artery, 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      Complications: Refractory to vasopressor support (maximum vasopressors utilized)    Disposition:      Final Diagnosis: Multiorgan failure secondary to mixed cardiogenic and distributive shock     Medical Problems             Resolved Problems  Date Reviewed: 10/18/2022   None                 Condition at Time of Death:      Date, Time and Cause of Death    Date of Death: 10/18/22  Time of Death:  2:41 PM  Preliminary Cause of Death: Cardiac arrest  Entered by: Dr Vandana Esqueda DO[ZB1 1]     Attribution     [id number]  Brice MORGAN  89 , DO 10/18/22 14:46          Death Note:    INPATIENT DEATH NOTE  Ruddy Guo 47 y o  male MRN: [id number]  Unit/Bed#: ICU 06 Encounter: [id number]    Date, Time and Cause of Death    Date of Death: 10/18/22  Time of Death:  2:41 PM  Preliminary Cause of Death: Cardiac arrest  Entered by: Dr Harris Jeronimo DO[ZB1 1]     Attribution     [email or web address]  Brice Goodwin U  89 , DO 10/18/22 14:46           Patient's Information  Pronounced by: Dr Audrey Black  Did the patient's death occur in the ED?: No  Did the patient's death occur in the OR?: No  Did the patient's death occur less than 10 days post-op?: No  Did the patient's death occur within 24 hours of admission?: No  Was code status DNR at the time of death?: Yes    PHYSICAL EXAM:  Unresponsive to noxious stimuli, Spontaneous respirations absent, Breath sounds absent, Carotid pulse absent, Heart sounds absent and Pupillary light reflex absent    Medical Examiner notification criteria:  NONE APPLICABLE   Medical Examiner's office notified?:  Yes   Medical Examiner accepted case?:  No  Name of Medical Examiner: Dr Heri Francisco     Family Notification  Was the family notified?: Yes  Date Notified: 10/18/22  Time Notified: 9517  Notified by: Dr Audrey Black  Name of Family Notified of Death: Alexandra (sister of the patient)   Relationship to Patient: Sister  Family Notification Route:  At bedside  Was the family told to contact a  home?: Yes (Have not decided on  home)    Autopsy Options:  Post-mortem examination declined by next of kin    Primary Service Attending Physician notified?:  yes - Attending:  Ashli Jeter,*    Physician/Resident responsible for completing Discharge Summary:  Dr Audrey Black

## 2022-10-18 NOTE — PROCEDURES
Arterial Line Insertion    Date/Time: 10/17/2022 11:00 AM  Performed by: Leopoldo Corrigan  Authorized by: Leopoldo Corrigan     Patient location:  ICU  Other Assisting Provider: No    Consent:     Consent obtained:  Written    Consent given by:  Healthcare agent (Patient's sister )    Risks discussed:  Bleeding, ischemia, repeat procedure and infection  Universal protocol:     Procedure explained and questions answered to patient or proxy's satisfaction: yes      Relevant documents present and verified: yes      Test results available and properly labeled: yes      Required blood products, implants, devices, and special equipment available: yes      Site/side marked: yes      Immediately prior to procedure a time out was called: yes      Patient identity confirmed:  Hospital-assigned identification number and arm band  Indications:     Indications: hemodynamic monitoring, continuous blood pressure monitoring and frequent labs / infusion    Pre-procedure details:     Skin preparation:  Chlorhexidine    Preparation: Patient was prepped and draped in sterile fashion    Anesthesia (see MAR for exact dosages): Anesthesia method:  Local infiltration    Local anesthetic:  Lidocaine 1% w/o epi  Procedure details:     Location / Tip of Catheter:  Femoral    Laterality:  Right    Needle gauge:  20 G    Placement technique:  Seldinger and ultrasound guided    Ultrasound image availability:  Images available in PACS    Sterile ultrasound techniques: Sterile gel and sterile probe covers were used      Number of attempts:  2    Successful placement: yes      Transducer: waveform confirmed    Post-procedure details:     Post-procedure:  Sterile dressing applied, secured with tape and sutured    Patient tolerance of procedure:   Tolerated well, no immediate complications

## 2022-10-18 NOTE — CONSULTS
Consultation - Nephrology   Gato Wu 47 y o  male MRN: [id number]  Unit/Bed#: ICU 06 Encounter: [id number]    ASSESSMENT and PLAN:  1  Severe lactic acidosis/elevated anion gap acidosis in the setting of cardiogenic shock and acute kidney injury  -lactate is 21 1, poor prognosis  -full code with aggressive measures per family  -dialysis consent obtained from sister, Mariella Comer, and on file  -temp HD line placed by ICU today  -CRRT ordered  -monitor serial labs  -of note, recent UA showed small blood, large leukocytes, 2+ protein, moderate bili, 4-10 RBCs, innumerable WBCs and bacteria    2  ESTIVEN, most likely due to cardiorenal syndrome/hypotension in setting of cardiogenic shock  -b/l sCr 0 9-1 1, now up to 1 73 with anuria  -initiating CRRT as below  -ineffective diuresis with bumex gtt     3  Hyponatremia, likely dilutional in setting of volume overload/ESTIVEN - correct with CRRT, monitor serial labs    4  Hypotension d/t cardiogenic shock and septic shock - on multiple pressors per ICU team, maintain MAP > 65, acidosis likely limiting in effectiveness of pressors    5  Anemia - Hgb 10, monitor CBC, transfuse prn    Other issues: acute on chronic systolic HF, seizure like activity, enterocutaneous fistula d/t prior intra abdominal surgeries, left lower lobe PNA, DM2, obesity, afib with RVR, hypothyroidism    SUMMARY OF RECOMMENDATIONS:  CRRT ordered: 2K/3cal bath, UF goal even to start, maintain MAP > 65, Qb 250ml/min, Qd 2000ml/hr    HISTORY OF PRESENT ILLNESS:  Requesting Physician: Patricia Maurer,*  Reason for Consult: severe acidosis, ESTIVEN    Gato Wu is a 47y o  year old male who was admitted to [facility] after presenting with unresponsiveness  A renal consultation is requested today for assistance in the management of ESTIVEN, severe acidosis  I have discussed the need for hemodialysis with the patient's sister, Mariella Comer and patient's fiance as well   Risks of dialysis including elecrolyte shifts, arrhythmia, hypotension, shock and death were reviewed  Risks of not having dialysis were also reviewed  These risks include hyperkalemia, electrolyte disturbances, hypervolemia, shock, acidosis, and death  The patient's sister has provided telephone consent for dialysis  Consent is in the chart  Unable to elicit HPI or review of systems patient intubated and sedated  Per record review, patient presents initially with unresponsiveness to the [facility] ER  He was noted to be in respiratory distress and hypotensive  Patient then intubated  Patient underwent cardiac arrest while in [address] [place] [address],[facility] Floor with [facility]  He was transferred to [facility] ICU for further management  He is currently on multiple maximum pressor support and intubated on 50% FiO2 with 10 of PEEP  He is now anuric      PAST MEDICAL HISTORY:  Past Medical History:   Diagnosis Date   • [facility]    • Anemia    • Anxiety    • Cancer [facility])    • Cardiac disease    • Cellulitis    • COPD (chronic obstructive pulmonary disease) ([place] [address] )    • Depression    • Diabetes mellitus ([place] [address] )    • DVT (deep venous thrombosis) ([facility])    • GERD (gastroesophageal reflux disease)    • HBP (high blood pressure)    • Heart failure (HCC)    • Hx of blood clots    • Hypertension    • Hypokalemia    • Hypothyroid    • Obesity    • Psychiatric disorder        PAST SURGICAL HISTORY:  Past Surgical History:   Procedure Laterality Date   • ABDOMINAL HERNIA REPAIR  05/2019   • CHOLECYSTECTOMY      Lap   • GASTRECTOMY SLEEVE LAPAROSCOPIC  08/2018   • INCISION AND DRAINAGE OF WOUND Bilateral 12/01/2021    Procedure: INCISION AND DRAINAGE (I&D) HEAD/FACE;  Surgeon: Sri Davis MD;  Location: [facility];  Service: General   • IR PICC PLACEMENT DOUBLE LUMEN  06/13/2022   • IVC FILTER INSERTION  2018   • KNEE SURGERY Right     open wound, injury    • LAPAROTOMY N/A 6/14/2022    Procedure: LAPAROTOMY EXPLORATORY, EXTENSIVE LYSIS OF ADHESIONS, APPLICATION OF Graham Dung;  Surgeon: Macey Garcia Steph Stephens MD;  Location: [facility];  Service: General   • LAPAROTOMY N/A 6/15/2022    Procedure: LAPAROTOMY EXPLORATORY WITH REPAIR OF SEROSAL INJURY;  Surgeon: Ruthy Moseley MD;  [facility];  Service: General   • LEG SURGERY Right 2009   • SPLIT THICKNESS SKIN GRAFT N/A 6/30/2022    Procedure: incision and drainage, wash out vac change;  Surgeon: Dora Lozano DO;  [facility];  Service: General   • SPLIT THICKNESS SKIN GRAFT N/A 7/5/2022    Procedure: PREPERATION RECIPIENT STSG SITE, DEBRIDEMENT NONVIABLE SKIN EDGE; PREPERATION RECIPIENT SKIN GRAFT SITE; APPLICATION OF SKIN SUBSTITUTE TO DONOR SITE; APPLICATION WOUND VAC X2 GREATER THAN 50CM;   Surgeon: Meg Medina DO;  [facility];  Service: General   • US GUIDED VASCULAR ACCESS  08/06/2018   • VAC DRESSING APPLICATION N/A 4/74/7837    Procedure: CHANGE DRESSING/VAC ABDOMEN;  Surgeon: Ruthy Moseley MD;  [facility];  Service: General   • VAC DRESSING APPLICATION N/A 4/56/8296    Procedure: ABDOMINAL WASHOUT, REPAIR OF SEROSAL TEARS,BRIDING VICRYL MESH, PARTIAL CLOSURE, APPLICATION OF WOUND VAC;  Surgeon: Dora Lozano DO;  [facility];  Service: General   • VAC DRESSING APPLICATION N/A 9/33/8265    Procedure: CHANGE DRESSING/VAC ABDOMEN;  Surgeon: Dora Lozano DO;  [facility];  Service: General       ALLERGIES:  Allergies   Allergen Reactions   • Penicillins Hives       SOCIAL HISTORY:  Social History     Substance and Sexual Activity   Alcohol Use Not Currently     Social History     Substance and Sexual Activity   Drug Use No     Social History     Tobacco Use   Smoking Status Former Smoker   • Packs/day: 1 00   • Years: 15 00   • Pack years: 15 00   Smokeless Tobacco Never Used   Tobacco Comment    1 5ppd x 23 years, quit 2014       FAMILY HISTORY:  Family History   Problem Relation Age of Onset   • Lung cancer Father    • Diabetes Maternal Aunt    • Heart attack Mother    • Clotting disorder Mother    • Hypertension Mother    • Heart failure Mother    • Diabetes Mother    • Atrial fibrillation Mother    • Sleep apnea Mother    • Obesity Mother    • Asthma Sister    • Stroke Neg Hx    • Anuerysm Neg Hx    • Arrhythmia Neg Hx    • Hyperlipidemia Neg Hx         pt unsure   • Fainting Neg Hx        MEDICATIONS:    Current Facility-Administered Medications:   •  [] amiodarone (CORDARONE) 900 mg in dextrose 5 % 500 mL infusion, 1 mg/min, Intravenous, Continuous, Stopped at 10/17/22 0732 **FOLLOWED BY** amiodarone (CORDARONE) 900 mg in dextrose 5 % 500 mL infusion, 0 5 mg/min, Intravenous, Continuous, Francisco Morin MD, Last Rate: 16 7 mL/hr at 10/17/22 2140, 0 5 mg/min at 10/17/22 2140  •  cefepime (MAXIPIME) IVPB (premix in dextrose) 2,000 mg 50 mL, 2,000 mg, Intravenous, Q12H, Francisco Morin MD, Last Rate: 100 mL/hr at 10/17/22 2332, 2,000 mg at 10/17/22 2332  •  chlorhexidine (PERIDEX) 0 12 % oral rinse 15 mL, 15 mL, Mouth/Throat, Q12H [facility], Francisco Morin MD, 15 mL at 10/17/22 2042  •  dextrose infusion 10 %, 50 mL/hr, Intravenous, Continuous, Ng MadelynBANDARNP, Last Rate: 50 mL/hr at 10/18/22 0026, 50 mL/hr at 10/18/22 0026  •  EPINEPHrine 6000 mcg (DOUBLE CONCENTRATION) IV in sodium chloride 0 9% 250 mL, 1-15 mcg/min, Intravenous, Titrated, Antione mSith, Last Rate: 37 5 mL/hr at 10/18/22 0212, 15 mcg/min at 10/18/22 0212  •  EPINEPHrine PF (FOR EMS ONLY) (ADRENALIN) 1 mg/mL injection 3 mg, 3 each, Does not apply, Once, RADHA Montoya  •  fentaNYL 1000 mcg in sodium chloride 0 9% 100mL infusion, 100 mcg/hr, Intravenous, Continuous, Ambrose Palacios MD, Stopped at 10/18/22 0119  •  fentanyl citrate (PF) 100 MCG/2ML 25 mcg, 25 mcg, Intravenous, Q1H PRN, Francisco Morin MD, 25 mcg at 10/17/22 0421  •  glycerin-hypromellose- (ARTIFICIAL TEARS) ophthalmic solution 1 drop, 1 drop, Both Eyes, Q4H PRN, RADHA Castañeda  •  [KODXKLVBL] hydrocortisone (Solu-CORTEF) injection 100 mg, 100 mg, Intravenous, Once, 100 mg at 10/17/22 0330 **FOLLOWED BY** hydrocortisone (Solu-CORTEF) injection 50 mg, 50 mg, Intravenous, Q6H [facility], Lexus Kohli, BANDARNP, 50 mg at 10/17/22 2332  •  levothyroxine tablet 25 mcg, 25 mcg, Oral, Early Morning, Sourav Gonsales MD, 25 mcg at 10/17/22 0536  •  LORazepam (ATIVAN) injection 2 mg, 2 mg, Intravenous, Once, Sourav Gonsales MD  •  multi-electrolyte (ISOLYTE-S PH 7 4) bolus 500 mL, 500 mL, Intravenous, Once, Ng Madelyn, CRNP, 500 mL at 10/18/22 0240  •  norepinephrine (LEVOPHED) 8 mg (DOUBLE CONCENTRATION) IV in sodium chloride 0 9% 250 mL, 1-60 mcg/min, Intravenous, Titrated, Kay Bae, Last Rate: 112 5 mL/hr at 10/18/22 0152, 60 mcg/min at 10/18/22 0152  •  NxStage K 2/Ca 3 dialysis solution (RFP-400) 20,000 mL, 20,000 mL, Dialysis, Continuous, Fbaiana Garland, DO  •  pantoprazole (PROTONIX) injection 40 mg, 40 mg, Intravenous, Q12H [facility], Ng Madelyn, CRNP  •  phenylephrine (YONI-SYNEPHRINE) 50 mg (STANDARD CONCENTRATION) in sodium chloride 0 9% 250 mL,  mcg/min, Intravenous, Titrated, Earnestine Shah MD, Last Rate: 90 mL/hr at 10/18/22 0101, 300 mcg/min at 10/18/22 0101  •  sodium bicarbonate 150 mEq in dextrose 5 % 1,000 mL infusion, 125 mL/hr, Intravenous, Continuous, Ng Madelyn, CRNP, Last Rate: 125 mL/hr at 10/18/22 0223, 125 mL/hr at 10/18/22 0223  •  vancomycin (VANCOCIN) 1,750 mg in sodium chloride 0 9 % 500 mL IVPB, 15 mg/kg (Adjusted), Intravenous, Q8H, Sourav Gonsales MD, Last Rate: 250 mL/hr at 10/18/22 0041, 1,750 mg at 10/18/22 0041  •  vasopressin (PITRESSIN) 20 Units in sodium chloride 0 9 % 100 mL infusion, 0 04 Units/min, Intravenous, Continuous, RADHA White, Last Rate: 12 mL/hr at 10/17/22 2257, 0 04 Units/min at 10/17/22 2257    REVIEW OF SYSTEMS:  Unable to elicit ROS as patient intubated/sedated      PHYSICAL EXAM:  Current Weight: Weight - Scale: (!) 178 kg (392 lb 6 7 oz)  First Weight: Weight - Scale: (!) 178 kg (391 lb 12 1 oz)  Vitals:    10/18/22 0050 10/18/22 0100 10/18/22 0110 10/18/22 0124   BP:       Pulse: (!) 159 (!) 152 (!) 145    Resp: (!) 28 (!) 29 (!) 29    Temp: 100 04 °F (37 8 °C) 100 04 °F (37 8 °C) 99 86 °F (37 7 °C)    TempSrc:       SpO2: 99% 99% 99%    Weight:    (!) 178 kg (392 lb 6 7 oz)   Height:           Intake/Output Summary (Last 24 hours) at 10/18/2022 0328  Last data filed at 10/18/2022 0000  Gross per 24 hour   Intake 6711 85 ml   Output 155 ml   Net 6556 85 ml     Physical Exam  Vitals reviewed  Constitutional:       General: He is not in acute distress  Appearance: He is well-developed  He is obese  He is not diaphoretic  HENT:      Head: Normocephalic and atraumatic  Nose: Nose normal       Mouth/Throat:      Pharynx: No oropharyngeal exudate  Comments: ETT  Eyes:      General:         Right eye: No discharge  Left eye: No discharge  Neck:      Thyroid: No thyromegaly  Cardiovascular:      Rate and Rhythm: Regular rhythm  Tachycardia present  Heart sounds: Normal heart sounds  Pulmonary:      Breath sounds: No wheezing or rales  Comments: Coarse BS b/l  Abdominal:      General: Bowel sounds are normal  There is no distension  Palpations: Abdomen is soft  Tenderness: There is no abdominal tenderness  Musculoskeletal:         General: Swelling present  Cervical back: Neck supple  Skin:     General: Skin is warm and dry  Coloration: Skin is not jaundiced     Neurological:      Comments: Intubated/sedated   Psychiatric:      Comments: Unable to assess as patient intubated/sedated         Invasive Devices:   Urethral Catheter (Active)   Reasons to continue Urinary Catheter  Accurate I&O assessment in critically ill patients (48 hr  max) 10/17/22 2000   Goal for Removal Voiding trial when ambulation improves 10/17/22 2000   Site Assessment Clean;Skin intact 10/17/22 2000   [facility] 10/17/22 2019   Collection Container Standard drainage bag 10/17/22 2000   Securement Method Securing device (Describe) 10/17/22 2000   Output (mL) 0 mL 10/18/22 0000     Lab Results:   Results from last 7 days   Lab Units 10/18/22  0031 10/17/22  2015 10/17/22  1623 10/17/22  1145 10/17/22  0806 10/17/22  0203 10/16/22  2121 10/16/22  2118 10/16/22  1904 10/16/22  1904 10/16/22  1724   WBC Thousand/uL  --   --   --   --  15 75*  --  5 27  --   --   --  5 79   HEMOGLOBIN g/dL  --   --   --   --  10 0*  --  13 8  --   --   --  9 8*   I STAT HEMOGLOBIN g/dl  --   --   --   --   --   --   --  11 6*   < > 16 0  --    HEMATOCRIT %  --   --   --   --  32 3*  --  46 8  --   --   --  32 8*   HEMATOCRIT, ISTAT %  --   --   --   --   --   --   --  34*   < > 47  --    PLATELETS Thousands/uL  --   --   --   --  159  --  96*  --   --   --  147*   POTASSIUM mmol/L 5 1 4 2 3 6   < > 4 3   < > 3 3*  --   --   --  4 3   CHLORIDE mmol/L 95* 93* 92*   < > 90*   < > 81*  --   --   --  77*   CO2 mmol/L 9* 20* 23   < > 30   < > 39*  --   --   --  >45*   CO2, I-STAT mmol/L  --   --   --   --   --   --   --  >45*   < > >45*  --    BUN mg/dL 28* 28* 27*   < > 26*   < > 22  --   --   --  20   CREATININE mg/dL 1 73* 1 43* 1 36*   < > 1 07   < > 0 98  --   --   --  1 01   CALCIUM mg/dL 8 3* 8 2* 8 5   < > 8 2*   < > 7 7*  --   --   --  8 8   MAGNESIUM mg/dL 2 3 2 1 2 1   < > 2 4   < > 3 4*  --   --   --   --    PHOSPHORUS mg/dL 5 5*  --   --   --   --   --  5 3*  --   --   --   --    ALK PHOS U/L 138*  --   --   --   --   --  191*  --   --   --  168*   ALT U/L 50  --   --   --   --   --  33  --   --   --  23   AST U/L 165*  --   --   --   --   --  84*  --   --   --  50*   GLUCOSE, ISTAT mg/dl  --   --   --   --   --   --   --  249*  --  232*  --     < > = values in this interval not displayed

## 2022-10-18 NOTE — PROGRESS NOTES
Critical Care Services- Interval Progress Note   Ruddy Guo 47 y o  male MRN: [id number]  Unit/Bed#: ICU 06 Encounter: [id number]  Assessment and Plan  Pt  Continues to have shock despite being on max doses of vasopressor  R femoral a-line inserted to confirm accuracy of radial a-line pressures  Femoral a-line with slightly higher systolic pressure, but hypotension persists  Pt  Remains in renal failure with no urine output recorded in last 7 hours  Attempted, Bumex trail with no increase in urine  In addition, pt  Is is now hypoglycemic with BS of 24  1 amp of D50 given and D10 drip started  Suspect shock liver  PH now 7 02 with lactate doubling to 19 8  Suspect this is a result of ongoing shock and liver being unable to clear lactate  2 amps of bicarb given  Talked with sister Yancy Peers regarding pt being in multi-organ failure and maxed out on pressors  Explained that his current state is incompatible with life and chest compressions would likely not result in return of cardiac function  Family aware of critical status  At this time would like patient to remain full code  Care discussed with Fellow Brisa Kline MD  Will discuss with family potential dialysis  • Diagnosis: Multi-organ dysfunction secondary to mixed cardiogenic and septic shock  o Plan: Continue vasopressors  o Continue stress dose steroids  o Continue Antibiotics  o Continue ongoing discussions with family regarding goals of care    • Diagnosis: Acute renal failure  o Plan: Continue bumex drip  o Discuss with family attempt at dialysis for volume management    • Diagnosis: Hypoglycemia  o Plan: likely secondary to shock liver  o Check BS Q1 hour  o Continue D10 gtt  Upon my evaluation, this patient had a high probability of imminent or life-threatening deterioration due to Cardiogneic shock, septic shock, multi-organ dysfunction, severe metabolic acidosis, which required my direct attention, intervention, and personal management       I have personally provided 180 minutes (1900 to 2200) on 10/17/2022 of critical care time, exclusive of procedures, teaching, family meetings, and any prior time recorded by providers other than myself  Time includes review of laboratory data, review of imaging/radiology results, discussion with consultants, monitoring for potential decompensation    Interventions were performed as documented above    -------------------------------------------------------------------------------------------------------------------------------------  Hemodynamic Monitoring:  Vital Signs:   BP (!) 88/28   Pulse (!) 145   Temp 99 86 °F (37 7 °C)   Resp (!) 29   Ht 5' 6" (1 676 m)   Wt (!) 177 kg (391 lb)   SpO2 99%   BMI 63 11 kg/m²   Cardiac Monitoring:   Telemetry rhythm: A-fib    Laboratory   Results from last 7 days   Lab Units 10/17/22  0806 10/16/22  2121 10/16/22  2118 10/16/22  1904 10/16/22  1724   WBC Thousand/uL 15 75* 5 27  --   --  5 79   HEMOGLOBIN g/dL 10 0* 13 8  --   --  9 8*   I STAT HEMOGLOBIN g/dl  --   --  11 6* 16 0  --    HEMATOCRIT % 32 3* 46 8  --   --  32 8*   HEMATOCRIT, ISTAT %  --   --  34* 47  --    PLATELETS Thousands/uL 159 96*  --   --  147*   NEUTROS PCT %  --  78*  --   --   --    BANDS PCT % 4  --   --   --   --    MONOS PCT %  --  5  --   --   --    MONO PCT % 2*  --   --   --  2*     Results from last 7 days   Lab Units 10/17/22  2015 10/17/22  1623 10/17/22  1145 10/17/22  0806 10/17/22  0521 10/17/22  0203 10/16/22  2121 10/16/22  1904 10/16/22  1724   SODIUM mmol/L 134* 134* 135 134* 134* 136 134*  --  133*   POTASSIUM mmol/L 4 2 3 6 3 7 4 3 3 6 2 9* 3 3*  --  4 3   CHLORIDE mmol/L 93* 92* 91* 90* 88* 84* 81*  --  77*   CO2 mmol/L 20* 23 28 30 31 29 39*  --  >45*   CO2, I-STAT   --   --   --   --   --   --   --    < >  --    ANION GAP mmol/L 21* 19* 16* 14* 15* 23* 14*  --   --    BUN mg/dL 28* 27* 26* 26* 25 24 22  --  20   CREATININE mg/dL 1 43* 1 36* 1 14 1 07 1 01 0 97 0 98  --  1 01 CALCIUM mg/dL 8 2* 8 5 8 6 8 2* 8 2* 8 3* 7 7*  --  8 8   GLUCOSE RANDOM mg/dL 114 103 127 122 175* 257* 240*  --  161*   ALT U/L  --   --   --   --   --   --  33  --  23   AST U/L  --   --   --   --   --   --  84*  --  50*   ALK PHOS U/L  --   --   --   --   --   --  191*  --  168*   ALBUMIN g/dL  --   --   --   --   --   --  2 6*  --  2 9*   TOTAL BILIRUBIN mg/dL  --   --   --   --   --   --  4 31*  --  4 67*    < > = values in this interval not displayed  Results from last 7 days   Lab Units 10/17/22  2015 10/17/22  1623 10/17/22  1145 10/17/22  0806 10/17/22  0521 10/17/22  0203 10/16/22  2121   MAGNESIUM mg/dL 2 1 2 1 2 2 2 4 2 5 2 0 3 4*   PHOSPHORUS mg/dL  --   --   --   --   --   --  5 3*      Results from last 7 days   Lab Units 10/17/22  1824 10/17/22  1137 10/17/22  0521 10/16/22  2121 10/16/22  1846   INR   --   --   --  1 20* 1 16   PTT seconds 172* 46* 71*  --  30          Results from last 7 days   Lab Units 10/17/22  2343 10/17/22  2015 10/17/22  1623 10/17/22  1419 10/17/22  1042 10/17/22  0745 10/17/22  0521   LACTIC ACID mmol/L 19 3* 9 9* 9 5* 11 0* 7 9* 7 8* 8 5*     ABG:  Results from last 7 days   Lab Units 10/17/22  1624   PH ART  7 397   PCO2 ART mm Hg 41 1   PO2 ART mm Hg 156 4*   HCO3 ART mmol/L 24 7   BASE EXC ART mmol/L -0 1   ABG SOURCE  Line, Arterial     VBG:  Results from last 7 days   Lab Units 10/18/22  0009 10/17/22  1628 10/17/22  1624   PH TRACI  7 029*   < >  --    PCO2 TRACI mm Hg 44 2   < >  --    PO2 TRACI mm Hg 50 9*   < >  --    HCO3 TRACI mmol/L 11 4*   < >  --    BASE EXC TRACI mmol/L -18 2   < >  --    ABG SOURCE   --   --  Line, Arterial    < > = values in this interval not displayed  Results from last 7 days   Lab Units 10/17/22  0024 10/16/22  1724   PROCALCITONIN ng/ml 0 80* 0 36*       Micro  Results from last 7 days   Lab Units 10/16/22  2321 10/16/22  2238 10/16/22  1846 10/16/22  1724   BLOOD CULTURE  Received in Microbiology Lab  Culture in Progress   Received in Microbiology Lab  Culture in Progress  Received in Microbiology Lab  Culture in Progress  Received in Microbiology Lab  Culture in Progress  Diagnostic Imaging / Data: I have personally reviewed pertinent reports     and I have personally reviewed pertinent films in PACS    Medications:  Current Facility-Administered Medications   Medication Dose Route Frequency   • amiodarone (CORDARONE) 900 mg in dextrose 5 % 500 mL infusion  0 5 mg/min Intravenous Continuous   • bumetanide (BUMEX) 12 5 mg infusion 50 mL  1 mg/hr Intravenous Continuous   • cefepime (MAXIPIME) IVPB (premix in dextrose) 2,000 mg 50 mL  2,000 mg Intravenous Q12H   • chlorhexidine (PERIDEX) 0 12 % oral rinse 15 mL  15 mL Mouth/Throat Q12H [address]   • dextrose infusion 10 %  50 mL/hr Intravenous Continuous   • EPINEPHrine 6000 mcg (DOUBLE CONCENTRATION) IV in sodium chloride 0 9% 250 mL  1-15 mcg/min Intravenous Titrated   • EPINEPHrine PF (FOR EMS ONLY) (ADRENALIN) 1 mg/mL injection 3 mg  3 each Does not apply Once   • fentaNYL 1000 mcg in sodium chloride 0 9% 100mL infusion  100 mcg/hr Intravenous Continuous   • fentanyl citrate (PF) 100 MCG/2ML 25 mcg  25 mcg Intravenous Q1H PRN   • heparin (porcine) 25,000 units in 0 45% NaCl 250 mL infusion (premix)  3-20 Units/kg/hr (Order-Specific) Intravenous Titrated   • hydrocortisone (Solu-CORTEF) injection 50 mg  50 mg Intravenous Q6H [address]   • levothyroxine tablet 25 mcg  25 mcg Oral Early Morning   • LORazepam (ATIVAN) injection 2 mg  2 mg Intravenous Once   • norepinephrine (LEVOPHED) 8 mg (DOUBLE CONCENTRATION) IV in sodium chloride 0 9% 250 mL  1-60 mcg/min Intravenous Titrated   • pantoprazole (PROTONIX) injection 40 mg  40 mg Intravenous Q24H [address]   • phenylephrine (YONI-SYNEPHRINE) 50 mg (STANDARD CONCENTRATION) in sodium chloride 0 9% 250 mL   mcg/min Intravenous Titrated   • vancomycin (VANCOCIN) 1,750 mg in sodium chloride 0 9 % 500 mL IVPB  15 mg/kg (Adjusted) Intravenous Q8H   • vasopressin (PITRESSIN) 20 Units in sodium chloride 0 9 % 100 mL infusion  0 04 Units/min Intravenous Continuous       SIGNATURE: RADHA Tijerina    Portions of the record may have been created with voice recognition software  Occasional wrong word or "sound a like" substitutions may have occurred due to the inherent limitations of voice recognition software    Read the chart carefully and recognize, using context, where substitutions have occurred

## 2022-10-19 LAB
ABO GROUP BLD BPU: NORMAL
ABO GROUP BLD BPU: NORMAL
BACTERIA UR CULT: ABNORMAL
BACTERIA UR CULT: ABNORMAL
BPU ID: NORMAL
BPU ID: NORMAL
UNIT DISPENSE STATUS: NORMAL
UNIT DISPENSE STATUS: NORMAL
UNIT PRODUCT CODE: NORMAL
UNIT PRODUCT CODE: NORMAL
UNIT PRODUCT VOLUME: 280 ML
UNIT PRODUCT VOLUME: 280 ML
UNIT RH: NORMAL
UNIT RH: NORMAL

## 2022-10-19 NOTE — UTILIZATION REVIEW
NOTIFICATION OF ADMISSION DISCHARGE   This is a Notification of Discharge from [facility]  Please be advised that this patient has been discharge from our facility  Below you will find the admission and discharge date and time including the patient’s disposition  UTILIZATION REVIEW CONTACT:  Kim Nichole MA  Utilization   Network Utilization Review Department  Phone: [phone] x carefully listen to the prompts  All voicemails are confidential   Email: [email or web address]     ADMISSION INFORMATION  PRESENTATION DATE: 10/16/2022  8:57 PM  OBERVATION ADMISSION DATE:   INPATIENT ADMISSION DATE: 10/16/22  8:57 PM   DISCHARGE DATE: 10/18/2022  5:30 PM  DISPOSITION:        IMPORTANT INFORMATION:  Send all requests for admission clinical reviews, approved or denied determinations and any other requests to dedicated fax number below belonging to the campus where the patient is receiving treatment   List of dedicated fax numbers:  [address] DENIALS (Administrative/Medical Necessity) [phone]   [address] (Maternity/NICU/Pediatrics) [phone]   [facility] [phone]   [facility] [phone]   [address] [phone]   220 [facility] [phone]   90 [facility] [phone]   [facility] 119 [phone]   [facility]  [phone]   4052 [facility] [phone]   412 [facility] 850 [address] [facility] [phone]

## 2022-10-20 ENCOUNTER — TELEPHONE (OUTPATIENT)
Dept: CASE MANAGEMENT | Facility: HOSPITAL | Age: 52
End: 2022-10-20

## 2022-10-20 NOTE — TELEPHONE ENCOUNTER
VM left for patient's sister, Dex Guerrero [phone]  Called  to inquire about resources for burial/cremation, but informed they are not aware any resources for adults  CM received call from patient's sister, Becca Baumann then explaining she has not been in contact with patient for several years so unsure what assistance she can provide  CM provided Becca Baumann with contact information from [facility] as they may be able to help with expenses  Deborah aware CM will call [facility] [place] to see if patient has any funds set aside for this  Becca Baumann will call CM tomorrow with an update on progress  CM also spoke with Serina Nur in [address] [place] [address] Avenue at [facility] [place] and was informed patient doesn't have any  arrangements on file, but Serina Nur is going to check with business office and inquire if patient has other funding  Awaiting call back from Serina Nur

## 2022-10-20 NOTE — TELEPHONE ENCOUNTER
Spoke with Blanca Buerger from [facility] [place] and informed patient does have an account with money, but the facility does not have access  Blanca Buerger provided CM information for patient's significant other, Adela Corrigan [phone] and reported patient's mother may have more information as well  CM updated patient's sister, Krystal Bradley to update her re:above  Krystal Bradley does have information already for Adela Corrigan and has been in contact with her  She reported patient's mother passed away 6 years ago and t her knowledge, did not have any information regarding finances for patient

## 2022-10-21 LAB
BACTERIA BLD CULT: ABNORMAL
GRAM STN SPEC: ABNORMAL
GRAM STN SPEC: ABNORMAL
S EPIDERMIDIS DNA BLD POS QL NAA+NON-PRB: DETECTED

## 2022-10-22 LAB
BACTERIA BLD CULT: NORMAL
BACTERIA BLD CULT: NORMAL

## 2022-10-26 NOTE — PROGRESS NOTES
Subjective       Patient resting on 2 liters O2. 97% No distress    I/O's       Intake/Output Summary (Last 24 hours) at 10/26/2022 1034  Last data filed at 10/26/2022 0700  Gross per 24 hour   Intake 880 ml   Output 600 ml   Net 280 ml       Medications     Current Facility-Administered Medications   Medication   • digoxin (LANOXIN) tablet 125 mcg   • methylPREDNISolone (SOLU-Medrol) PF injection 40 mg   • loratadine (CLARITIN) tablet 10 mg   • metoPROLOL tartrate (LOPRESSOR) tablet 25 mg   • sodium chloride 0.9 % flush bag 25 mL   • sodium chloride (PF) 0.9 % injection 2 mL   • sodium chloride (NORMAL SALINE) 0.9 % bolus 500 mL   • piperacillin-tazobactam (ZOSYN) 3.375 g in sodium chloride 0.9 % 100 mL IVPB   • ipratropium-albuterol (DUONEB) 0.5-2.5 (3) MG/3ML nebulizer solution 3 mL   • acetaminophen (TYLENOL) tablet 650 mg   • apixaBAN (ELIQUIS) tablet 5 mg   • atorvastatin (LIPITOR) tablet 40 mg   • budesonide-formoterol (SYMBICORT) 80-4.5 MCG/ACT inhaler 2 puff   • carbidopa-levodopa (SINEMET)  MG per tablet 1 tablet   • ipratropium-albuterol (DUONEB) 0.5-2.5 (3) MG/3ML nebulizer solution 3 mL   • guaiFENesin-DM (ROBITUSSIN DM) 100-10 MG/5ML syrup 10 mL   • docusate sodium-sennosides (SENOKOT S) 50-8.6 MG 2 tablet   • levothyroxine (SYNTHROID, LEVOTHROID) tablet 125 mcg   • QUEtiapine (SEROquel) tablet 200 mg   • QUEtiapine (SEROquel) tablet 50 mg   • QUEtiapine (SEROquel) tablet 100 mg   • sodium chloride tablet 1,000 mg   • valproic acid (DEPAKENE) solution 500 mg   • metoPROLOL (LOPRESSOR) injection 5 mg   • dextrose 50 % injection 25 g   • dextrose 50 % injection 12.5 g   • glucagon (GLUCAGEN) injection 1 mg   • dextrose (GLUTOSE) 40 % gel 15 g   • dextrose (GLUTOSE) 40 % gel 30 g   • insulin lispro (ADMELOG,HumaLOG) - Correction Dose   • scopolamine (TRANSDERM_SCOP) 1 MG/3DAYS patch 1 patch   • albuterol (VENTOLIN) nebulizer 2.5 mg        Last Recorded Vitals     Blood pressure 124/77, pulse 74,  Transported pt to MICU w/o complications temperature 97.5 °F (36.4 °C), temperature source Axillary, resp. rate (!) 28, height 5' 7\" (1.702 m), weight 85.3 kg (188 lb 0.8 oz), SpO2 96 %.  Body mass index is 29.45 kg/m².    Review of Systems     Physical Exam  Constitutional:       Appearance: He is ill-appearing.   HENT:      Head: Normocephalic and atraumatic.   Eyes:      Extraocular Movements: Extraocular movements intact.      Pupils: Pupils are equal, round, and reactive to light.   Cardiovascular:      Rate and Rhythm: Normal rate and regular rhythm.      Pulses: Normal pulses.      Heart sounds: Normal heart sounds.   Pulmonary:      Effort: Pulmonary effort is normal.      Breath sounds: Normal breath sounds.   Abdominal:      General: Bowel sounds are normal.      Palpations: Abdomen is soft.   Musculoskeletal:         General: No swelling.   Skin:     General: Skin is warm.      Capillary Refill: Capillary refill takes less than 2 seconds.   Neurological:      Mental Status: He is alert.      Comments: Eyes open. Does give some verbal response         Labs          Recent Labs   Lab 10/26/22  0622 10/25/22  0856   SODIUM 142 139   POTASSIUM 4.7 4.5   CHLORIDE 111* 109   CO2 28 27   GLUCOSE 195* 175*   BUN 27* 26*   CREATININE 0.40* 0.62*     Recent Labs   Lab 10/26/22  0622 10/25/22  0856   WBC 4.2 4.4   HCT 36.0* 36.2*   HGB 12.4* 12.3*   * 99*           Imaging     No results found.      Assessment/Plan:     Principal Problem:    Pneumonia due to infectious organism, unspecified laterality, unspecified part of lung  Active Problems:    Aspiration pneumonia (CMS/formerly [facility])    COVID-19 virus detected    Continue steroids  abx  Patient in SR  Continue to monitor    Brooklynn Key MD

## 2022-12-13 ENCOUNTER — TELEPHONE (OUTPATIENT)
Dept: CASE MANAGEMENT | Facility: HOSPITAL | Age: 52
End: 2022-12-13

## 2022-12-13 NOTE — TELEPHONE ENCOUNTER
CM reached out to [facility] office to inquire about progress on plans as patient remains in [facility] s/w  who took information including contact info for patient's sister  CM reviewed that we are looking to have patient taken from the [facility] by the 's office at this point as we are approaching 2 months since patient's passing  Cm provided callback information,  states she will notify corner  Awaiting call back

## 2023-01-17 ENCOUNTER — TELEPHONE (OUTPATIENT)
Dept: CASE MANAGEMENT | Facility: HOSPITAL | Age: 53
End: 2023-01-17

## 2023-01-17 NOTE — TELEPHONE ENCOUNTER
CM reached out to patient's sister [facility] [phone] to review patient's body still remains in hospital [facility] and to inquire about her willingness and ability to accept his cremains if we are able to have him cremated  Michelle Villalba informed MUNDO that she signed paperwork last week with his nursing home, [facility], to release his body  CM contacted [facility] and s/w [place] to request they come to retrieve patient's body as our morgue has been at capacity and patient passed away over 2 months and remains unclaimed  CM reviewed information above regarding conversation with sister stating she signed forms  Edouard Condon states she will pass message to  Orly Quintanilla and request he call CM back  Awaiting return response

## 2023-01-24 ENCOUNTER — TELEPHONE (OUTPATIENT)
Dept: CASE MANAGEMENT | Facility: HOSPITAL | Age: 53
End: 2023-01-24

## 2023-01-24 NOTE — TELEPHONE ENCOUNTER
MUNDO contacted [facility] office and spoke with Rola Sykes to request patient be removed from hospital [facility] as he remains unclaimed in [address] for over 90 days at this point  MUNDO reviewed multiple phone calls previously made with no call back from  in over a month  CM reviewed patient's sister stated she signed paperwork regarding release of his body last week but we (hospital) were not contacted or notified by the county  Rola Sykes state she will notify  that CM called again  CM again reiterated request that patient be removed from the hospital [facility] at this time

## 2023-01-25 ENCOUNTER — TELEPHONE (OUTPATIENT)
Dept: CASE MANAGEMENT | Facility: HOSPITAL | Age: 53
End: 2023-01-25

## 2023-01-25 NOTE — TELEPHONE ENCOUNTER
Dictated VM received from :    "Giulia, this is Stephenie Gandhi in [facility]  , I received your message and I have called back  I have spoke to other people in your department concerning this case of cease  I will try giving you a call back again tomorrow  It's not the responsibility of the 's office to remove those bodies  This is an unfunded case  There's a next of kin  I coordinated having the next of kin sign off on a cremation  It's the nursing homes responsibility  They collected the money on this individual and the  was going to look for funding  Again, I'll call back tomorrow and we can discuss this further  This is Saleem Xaiver, [facility] corner  Thank you "      CM spoke with Martin Macias at [facility] [place] who reported the facility is unable to access any of the 's funds and that patient's sister was only able to raise around $100 on GoFund[employer]  [facility] met patient's sister at her workplace and had her sign cremation paperwork on behalf of the 's and per his request, but was told the  is not accepting this case or providing any further assistance  Hospital will now assist with payment for cremation  CM contacted Makayla Coy and  will take on the case and contact CM later today or tomorrow  VM left for patient's sister to expect call from Makayla Coy

## 2023-01-26 ENCOUNTER — TELEPHONE (OUTPATIENT)
Dept: CASE MANAGEMENT | Facility: HOSPITAL | Age: 53
End: 2023-01-26

## 2023-01-26 NOTE — TELEPHONE ENCOUNTER
Spoke with [facility]  home and they are working on managing cremation  Cost is 2,[phone] 00  Severa Cleaves awaiting signature from  on cremation consent

## 2023-02-06 ENCOUNTER — TELEPHONE (OUTPATIENT)
Dept: CASE MANAGEMENT | Facility: HOSPITAL | Age: 53
End: 2023-02-06

## 2023-02-06 NOTE — TELEPHONE ENCOUNTER
Naeem received from [facility] 2/3   left for patient's sister, Shana Acosta [phone]  To make arrangements for her to take possession of cremains

## 2023-02-07 ENCOUNTER — TELEPHONE (OUTPATIENT)
Dept: CASE MANAGEMENT | Facility: HOSPITAL | Age: 53
End: 2023-02-07

## 2023-02-07 NOTE — TELEPHONE ENCOUNTER
Spoke with patient's sister on 2/6 and cremains delivered to patient's sister's home at [address] [facility]  [address] 2/7

## 2023-02-16 NOTE — ASSESSMENT & PLAN NOTE
Patient denies of any dizziness or lightheadedness  Blood pressure was checked twice with the bigger cuff  Will also check a manual blood pressure  Patient is on metoprolol which will be held  Patient also received pain medication with Dilaudid and oxycodone  Patient is asymptomatic  Restart mediations  Restart mediations

## 2023-03-17 NOTE — LETTER
Thank you for allowing us to participate in the care of your patient, Candice Null, who was hospitalized from 03/13/2022 through 3/18/2022 with the admitting diagnosis of esophagitis presenting as abdominal pain  Patient had an episode of hematemesis  Gastroenterology performed EGD which showed mild distal esophagitis, with food retention in the gastric cardia suspicious for either gastroparesis versus bezoar  Patient was managed with IV ppi, pain regimen, and clear liquid diet initially with advancement  For evaluation of EGD findings, patient required imaging either upper GI see reassess or a CT of the abdomen with IV contrast   However, unable to obtain imaging as upper GI series machinery was unable to support patient's weight  Also, unable to obtain CT AP w IV  Contacted CT team at [facility] center, as they have Bariatric CT  Patient's hip measurement of greater than 80 cm (currently 97 cm)  He is unable to fit in CT bore opening  As per Gastroenterology, patient had stabilization of pain and nausea/vomiting, and would manage with diet advancement with continuation of PPI  Further imaging showed be attempted in the outpatient setting  Patient also arrived with increased oxygen requirements from baseline of 2 L  Patient received IV Lasix which improved and was started on pain regimen  Cardiology team consulted and adjusted diuretic regimen for CHF exacerbation  Patient now on torsemide 40 mg b i d , with 2 g and 1 8 L fluid restriction diet  Highly recommend that if patient has worsening symptoms or recurrence of symptoms, patient should reach out to PCP, and if PCP unable to assist patient to arrive at [facility] emergency department for assessment with imaging as [facility] system does not have imaging that can accommodate patient's body habitus  Patient has been referred for outpatient weight management  Patient should follow-up with outpatient Gastroenterology and Cardiology      If you have The following labs were labeled with appropriate pt sticker and tubed to lab:     [] Blue     [] Lavender   [] on ice  [] Green/yellow  [] Green/black [] on ice  [] Larence Gosselin  [] on ice  [] Yellow  [] Red  [] Type/ Screen  [] ABG  [] VBG    [x] COVID-19 swab    [x] Rapid  [] PCR  [] Flu swab  [] Peds Viral Panel     [] Urine Sample  [] Fecal Sample  [] Pelvic Cultures  [] Blood Cultures  [] X 2  [] STREP Cultures         Norm Suarez RN  03/16/23 4578 any additional questions or would like to discuss further, please feel free to contact me      MD Freda Don  Internal Medicine, Hospitalist  [phone]

## 2023-06-22 NOTE — PLAN OF CARE
Problem: Potential for Falls  Goal: Patient will remain free of falls  Description: INTERVENTIONS:  - Assess patient frequently for physical needs  -  Identify cognitive and physical deficits and behaviors that affect risk of falls    -  [place] fall precautions as indicated by assessment   - Educate patient/family on patient safety including physical limitations  - Instruct patient to call for assistance with activity based on assessment  - Modify environment to reduce risk of injury  - Consider OT/PT consult to assist with strengthening/mobility  5/10/2021 2317 by Marco Szymanski RN  Outcome: Progressing  5/10/2021 2317 by Marco Szymanski RN  Outcome: Progressing     Problem: Prexisting or High Potential for Compromised Skin Integrity  Goal: Skin integrity is maintained or improved  Description: INTERVENTIONS:  - Identify patients at risk for skin breakdown  - Assess and monitor skin integrity  - Assess and monitor nutrition and hydration status  - Monitor labs   - Assess for incontinence   - Turn and reposition patient  - Assist with mobility/ambulation  - Relieve pressure over bony prominences  - Avoid friction and shearing  - Provide appropriate hygiene as needed including keeping skin clean and dry  - Evaluate need for skin moisturizer/barrier cream  - Collaborate with interdisciplinary team   - Patient/family teaching  - Consider wound care consult   5/10/2021 2317 by Marco Szymanski RN  Outcome: Progressing  5/10/2021 2317 by Marco Szymanski RN  Outcome: Progressing     Problem: PAIN - ADULT  Goal: Verbalizes/displays adequate comfort level or baseline comfort level  Description: Interventions:  - Encourage patient to monitor pain and request assistance  - Assess pain using appropriate pain scale  - Administer analgesics based on type and severity of pain and evaluate response  - Implement non-pharmacological measures as appropriate and evaluate response  - Consider cultural and social influences on pain and Pt notified This RN called [facility] for RN to RN report. RN to RN report done with Lexa REID over the phone. Lexa REID verbalized understanding and agreeable to discharge instructions.    pain management  - Notify physician/advanced practitioner if interventions unsuccessful or patient reports new pain  5/10/2021 2317 by Ilene Jha RN  Outcome: Progressing  5/10/2021 2317 by Ilene Jha RN  Outcome: Progressing     Problem: SAFETY ADULT  Goal: Patient will remain free of falls  Description: INTERVENTIONS:  - Assess patient frequently for physical needs  -  Identify cognitive and physical deficits and behaviors that affect risk of falls    -  [place] fall precautions as indicated by assessment   - Educate patient/family on patient safety including physical limitations  - Instruct patient to call for assistance with activity based on assessment  - Modify environment to reduce risk of injury  - Consider OT/PT consult to assist with strengthening/mobility  5/10/2021 2317 by Ilene Jha RN  Outcome: Progressing  5/10/2021 2317 by Ilene Jha RN  Outcome: Progressing  Goal: Maintain or return to baseline ADL function  Description: INTERVENTIONS:  -  Assess patient's ability to carry out ADLs; assess patient's baseline for ADL function and identify physical deficits which impact ability to perform ADLs (bathing, care of mouth/teeth, toileting, grooming, dressing, etc )  - Assess/evaluate cause of self-care deficits   - Assess range of motion  - Assess patient's mobility; develop plan if impaired  - Assess patient's need for assistive devices and provide as appropriate  - Encourage maximum independence but intervene and supervise when necessary  - Involve family in performance of ADLs  - Assess for home care needs following discharge   - Consider OT consult to assist with ADL evaluation and planning for discharge  - Provide patient education as appropriate  5/10/2021 2317 by Ilene Jha RN  Outcome: Progressing  5/10/2021 2317 by Ilene Jha RN  Outcome: Progressing  Goal: Maintain or return mobility status to optimal level  Description: INTERVENTIONS:  - Assess patient's baseline mobility status (ambulation, transfers, stairs, etc )    - Identify cognitive and physical deficits and behaviors that affect mobility  - Identify mobility aids required to assist with transfers and/or ambulation (gait belt, sit-to-stand, lift, walker, cane, etc )  - [place] fall precautions as indicated by assessment  - Record patient progress and toleration of activity level on Mobility SBAR; progress patient to next Phase/Stage  - Instruct patient to call for assistance with activity based on assessment  - Consider rehabilitation consult to assist with strengthening/weightbearing, etc   5/10/2021 2317 by Jone Chapman RN  Outcome: Progressing  5/10/2021 2317 by Jone Chapman RN  Outcome: Progressing     Problem: Knowledge Deficit  Goal: Patient/family/caregiver demonstrates understanding of disease process, treatment plan, medications, and discharge instructions  Description: Complete learning assessment and assess knowledge base    Interventions:  - Provide teaching at level of understanding  - Provide teaching via preferred learning methods  Outcome: Progressing     Problem: CARDIOVASCULAR - ADULT  Goal: Maintains optimal cardiac output and hemodynamic stability  Description: INTERVENTIONS:  - Monitor I/O, vital signs and rhythm  - Monitor for S/S and trends of decreased cardiac output  - Administer and titrate ordered vasoactive medications to optimize hemodynamic stability  - Assess quality of pulses, skin color and temperature  - Assess for signs of decreased coronary artery perfusion  - Instruct patient to report change in severity of symptoms  Outcome: Progressing  Goal: Absence of cardiac dysrhythmias or at baseline rhythm  Description: INTERVENTIONS:  - Continuous cardiac monitoring, vital signs, obtain 12 lead EKG if ordered  - Administer antiarrhythmic and heart rate control medications as ordered  - Monitor electrolytes and administer replacement therapy as ordered  Outcome: Progressing Problem: RESPIRATORY - ADULT  Goal: Achieves optimal ventilation and oxygenation  Description: INTERVENTIONS:  - Assess for changes in respiratory status  - Assess for changes in mentation and behavior  - Position to facilitate oxygenation and minimize respiratory effort  - Oxygen administered by appropriate delivery if ordered  - Initiate smoking cessation education as indicated  - Encourage broncho-pulmonary hygiene including cough, deep breathe, Incentive Spirometry  - Assess the need for suctioning and aspirate as needed  - Assess and instruct to report SOB or any respiratory difficulty  - Respiratory Therapy support as indicated  Outcome: Progressing

## 2023-07-18 NOTE — PROCEDURES
Temporary HD Catheter    Date/Time: 10/18/2022 3:14 AM  Performed by: Iman Davis  Authorized by: Iman Davis     Patient location:  Bedside  Other Assisting Provider: Yes (comment) Huan Pennington    Consent:     Consent obtained:  Written    Consent given by:  Patient    Risks discussed:  Arterial puncture, incorrect placement, pneumothorax and bleeding  Universal protocol:     Procedure explained and questions answered to patient or proxy's satisfaction: yes      Relevant documents present and verified: yes      Test results available and properly labeled: yes      Radiology Images displayed and confirmed  If images not available, report reviewed: yes      Required blood products, implants, devices, and special equipment available: yes      Site/side marked: yes      Immediately prior to procedure, a time out was called: yes      Patient identity confirmed:  Arm band and hospital-assigned identification number  Pre-procedure details:     Hand hygiene: Hand hygiene performed prior to insertion      Sterile barrier technique: All elements of maximal sterile technique followed      Skin preparation:  ChloraPrep    Skin preparation agent: Skin preparation agent completely dried prior to procedure    Indications:     Central line indications: dialysis    Anesthesia (see MAR for exact dosages):      Anesthesia method:  Local infiltration    Local anesthetic:  Lidocaine 1% w/o epi  Procedure details:     Location:  Left internal jugular    Vessel type: vein      Approach: percutaneous technique used      Patient position:  Reverse Trendelenburg    Catheter type:  Double lumen    Catheter size:  13 5 Fr    Catheter length:  24 cm    Landmarks identified: yes      Ultrasound guidance: yes      Ultrasound image availability:  Images available in PACS    Sterile ultrasound techniques: Sterile gel and sterile probe covers were used      Number of attempts:  1    Successful placement: yes      Vessel of catheter tip end:  Cavo-atrial junction  Post-procedure details:     Post-procedure:  Line sutured    Assessment:  No pneumothorax on x-ray    Post-procedure complications: none [de-identified] : First-time visit for this 50-year-old gentleman he is here with a complaint of left knee pain for about 1 month.  Patient believes he injured himself just going up and down the stairs at home he has a three-story walker.  He felt a twinge in the left knee when going up steps he has been having difficulty going up and down steps ever since has noticed some swelling in the knee and some warmth he also feels that it is unstable sometimes.  He recalls no other specific accident injury or initiating traumatic event.

## 2024-07-10 NOTE — QUICK NOTE
Placed TPN order-pharmacy confirmed receipt  However the patient gets transferred to [facility] today before 9:00 p m, patient may not get TPN as it is sent from a 3rd party company through the pharmacy to the floor  Will hold off on TPN for now  Consider initiating after transfer tomorrow  Statement Selected

## 2025-06-27 NOTE — PROGRESS NOTES
Skin excision    Date/Time: 6/2/2022 1:19 PM  Performed by: Delmar Melo MD  Authorized by: Delmar Melo MD   Universal Protocol:  Consent: Written consent obtained  Risks and benefits: risks, benefits and alternatives were discussed  Consent given by: patient  Time out: Immediately prior to procedure a "time out" was called to verify the correct patient, procedure, equipment, support staff and site/side marked as required  Timeout called at: 6/2/2022 12:30 PM   Patient understanding: patient states understanding of the procedure being performed  Site marked: the operative site was marked    Procedure Details - Skin Excision:     Number of Lesions:  1  Lesion 1:     Body area:  Head/neck    Head/neck location:  Neck (Nape of neck)    Initial size (mm):  15       Final defect size (mm):  15    Malignancy: benign lesion      Destruction method: scissors used for extraction      Repair type:  Linear closure    Closure complexity: intermediate      Surgical defect:  4 1    Repair size (cm):  4 1     Repair Comments: 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

## (undated) DEVICE — VAC DRESSING SENSATRAC LRG

## (undated) DEVICE — INTENDED FOR TISSUE SEPARATION, AND OTHER PROCEDURES THAT REQUIRE A SHARP SURGICAL BLADE TO PUNCTURE OR CUT.: Brand: BARD-PARKER SAFETY BLADES SIZE 15, STERILE

## (undated) DEVICE — ABTHERA OPEN ABDOMEN DRESSING WITH SENSATRAC PAD: Brand: ABTHERA™ SENSAT.R.A.C.™

## (undated) DEVICE — SPONGE LAP 18 X 18 IN STRL RFD

## (undated) DEVICE — MEDI-VAC YANK SUCT HNDL W/TPRD BULBOUS TIP: Brand: CARDINAL HEALTH

## (undated) DEVICE — SUT CHROMIC 3-0 SH 27 IN G122H

## (undated) DEVICE — ELECTRODE BLADE MOD  E-Z CLEAN 6.5IN -0014M

## (undated) DEVICE — GAUZE SPONGES,USP TYPE VII GAUZE, 12 PLY: Brand: CURITY

## (undated) DEVICE — VAC CANISTER 500ML

## (undated) DEVICE — SUT SILK 3-0 SH CR/8 18 IN C013D

## (undated) DEVICE — GLOVE SRG BIOGEL ECLIPSE 7.5

## (undated) DEVICE — SUT ETHILON 2-0 FSLX 30 IN 1674H

## (undated) DEVICE — SUT VICRYL 2-0 18 IN J905T

## (undated) DEVICE — GLOVE INDICATOR PI UNDERGLOVE SZ 7.5 BLUE

## (undated) DEVICE — ENSEAL TISSUE SEALER G2 SUPER JAW CURVED FOR USE WITH GENERATOR G11 22CM SHAFT LENGTH: Brand: ENSEAL

## (undated) DEVICE — TRAY FOLEY 16FR URIMETER SURESTEP

## (undated) DEVICE — NEEDLE 25G X 1 1/2

## (undated) DEVICE — BASIC DOUBLE BASIN 2-LF: Brand: MEDLINE INDUSTRIES, INC.

## (undated) DEVICE — SUT SILK 3-0 30 IN SA84H

## (undated) DEVICE — 3M™ IOBAN™ 2 ANTIMICROBIAL INCISE DRAPE 6650EZ: Brand: IOBAN™ 2

## (undated) DEVICE — V.A.C. DRAPE: Brand: V.A.C.®

## (undated) DEVICE — CHLORAPREP HI-LITE 26ML ORANGE

## (undated) DEVICE — 3M™ TEGADERM™ TRANSPARENT FILM DRESSING FRAME STYLE, 1628, 6 IN X 8 IN (15 CM X 20 CM), 10/CT 8CT/CASE: Brand: 3M™ TEGADERM™

## (undated) DEVICE — PLUMEPEN PRO 10FT

## (undated) DEVICE — GLOVE INDICATOR PI UNDERGLOVE SZ 6.5 BLUE

## (undated) DEVICE — SYRINGE 10ML LL

## (undated) DEVICE — FISH VISCERAL RETAINER ABD LG

## (undated) DEVICE — DERMATOME BLADES: Brand: DERMATOME

## (undated) DEVICE — PROXIMATE SKIN STAPLERS (35 WIDE) CONTAINS 35 STAINLESS STEEL STAPLES (FIXED HEAD): Brand: PROXIMATE

## (undated) DEVICE — GLOVE SRG BIOGEL 7.5

## (undated) DEVICE — POV-IOD SOLUTION 4OZ BT

## (undated) DEVICE — 3000CC GUARDIAN II: Brand: GUARDIAN

## (undated) DEVICE — BETHLEHEM UNIVERSAL MINOR GEN: Brand: CARDINAL HEALTH

## (undated) DEVICE — VAC GRANUFOAM DRESSING X-LARGE FEATURING SENSATRAC TECHNOLOGY: Brand: V.A.C.® GRANUFOAM™

## (undated) DEVICE — SUT PDS II 1 XLH 96 IN LOOPED Z881G

## (undated) DEVICE — SUT VICRYL 3-0 SH 27 IN J416H

## (undated) DEVICE — SENSATRAC PAD: Brand: SENSAT.R.A.C.™

## (undated) DEVICE — FABRIC REINFORCED, SURGICAL GOWN, XL: Brand: CONVERTORS

## (undated) DEVICE — SUT SILK 3-0 SH 30 IN K832H

## (undated) DEVICE — JACKSON-PRATT 100CC BULB RESERVOIR: Brand: CARDINAL HEALTH

## (undated) DEVICE — DRAPE UTILITY

## (undated) DEVICE — GLOVE SRG BIOGEL 7

## (undated) DEVICE — ABDOMINAL PAD: Brand: DERMACEA

## (undated) DEVICE — SUT PDS II 2-0 CT-1 27 IN Z339H

## (undated) DEVICE — TUBING SUCTION 5MM X 12 FT

## (undated) DEVICE — SUT SILK 0 30 IN A306H

## (undated) DEVICE — SUT VICRYL 0 CT-1 27 IN J260H

## (undated) DEVICE — TONGUE DEPRESSOR STERILE

## (undated) DEVICE — DRESSING MEPILEX AG BORDER POST-OP 4 X 12 IN

## (undated) DEVICE — VAC WHITEFOAM DRESSING LARGE: Brand: V.A.C. WHITEFOAM™

## (undated) DEVICE — MINOR PROCEDURE DRAPE: Brand: CONVERTORS

## (undated) DEVICE — TUBING DAKIN 1/8 X 1/32

## (undated) DEVICE — IMPERVIOUS SURGICAL GOWN, LG: Brand: CONVERTORS

## (undated) DEVICE — STOPCOCK 3-WAY

## (undated) DEVICE — SPONGE STICK WITH PVP-I: Brand: KENDALL

## (undated) DEVICE — POOLE SUCTION HANDLE: Brand: CARDINAL HEALTH

## (undated) DEVICE — MEDI-VAC YANKAUER SUCTION HANDLE W/STRAIGHT TIP & CONTROL VENT: Brand: CARDINAL HEALTH

## (undated) DEVICE — DRESSING XEROFORM 5 X 9

## (undated) DEVICE — ALCOHOL ISOPROPYL BLUE

## (undated) DEVICE — DRAPE LAPAROTOMY W/POUCHES

## (undated) DEVICE — PREMIUM DRY TRAY LF: Brand: MEDLINE INDUSTRIES, INC.

## (undated) DEVICE — PROXIMATE PLUS MD MULTI-DIRECTIONAL RELEASE SKIN STAPLERS CONTAINS 35 STAINLESS STEEL STAPLES APPROXIMATE CLOSED DIMENSIONS: 6.9MM X 3.9MM WIDE: Brand: PROXIMATE

## (undated) DEVICE — 3 TO 1 DERMACARRIER: Brand: MESHGRAFTTM II TISSUE EXPANSION SYSTEM

## (undated) DEVICE — BETHLEHEM MAJOR GENERAL PACK: Brand: CARDINAL HEALTH

## (undated) DEVICE — JP CHANNEL DRAIN, 24FR HUBLESS: Brand: CARDINAL HEALTH

## (undated) DEVICE — CURITY PLAIN PACKING STRIP: Brand: CURITY

## (undated) DEVICE — VAC WHITEFOAM DRESSING SMALL FOAM ONLY: Brand: V.A.C.®

## (undated) DEVICE — GLOVE SRG BIOGEL ECLIPSE 6.5

## (undated) DEVICE — VAC Y-CONNECTOR

## (undated) DEVICE — INTENDED FOR TISSUE SEPARATION, AND OTHER PROCEDURES THAT REQUIRE A SHARP SURGICAL BLADE TO PUNCTURE OR CUT.: Brand: BARD-PARKER SAFETY BLADES SIZE 10, STERILE

## (undated) DEVICE — ALL PURPOSE SPONGES,NONWOVEN, 4 PLY: Brand: CURITY

## (undated) DEVICE — GLOVE INDICATOR PI UNDERGLOVE SZ 7 BLUE

## (undated) DEVICE — 4-PORT MANIFOLD: Brand: NEPTUNE 2

## (undated) DEVICE — GENERAL/ PLASTIC TRAY STANDARD: Brand: DEROYAL

## (undated) DEVICE — BARD COMPOSIX L/P MESH
Type: IMPLANTABLE DEVICE | Site: ABDOMEN | Status: NON-FUNCTIONAL
Brand: BARD COMPOSIX L/P MESH

## (undated) DEVICE — GLOVE INDICATOR PI UNDERGLOVE SZ 8 BLUE